# Patient Record
Sex: MALE | Race: WHITE | NOT HISPANIC OR LATINO | Employment: OTHER | URBAN - METROPOLITAN AREA
[De-identification: names, ages, dates, MRNs, and addresses within clinical notes are randomized per-mention and may not be internally consistent; named-entity substitution may affect disease eponyms.]

---

## 2017-01-03 ENCOUNTER — OFFICE VISIT - HEALTHEAST (OUTPATIENT)
Dept: VASCULAR SURGERY | Facility: CLINIC | Age: 72
End: 2017-01-03

## 2017-01-03 DIAGNOSIS — L30.9 DERMATITIS: ICD-10-CM

## 2017-01-03 DIAGNOSIS — I87.303 VENOUS HYPERTENSION, CHRONIC, BILATERAL: ICD-10-CM

## 2017-01-03 DIAGNOSIS — L97.921 LEG ULCER, LEFT, LIMITED TO BREAKDOWN OF SKIN (H): ICD-10-CM

## 2017-01-03 DIAGNOSIS — G60.9 IDIOPATHIC PERIPHERAL NEUROPATHY: ICD-10-CM

## 2017-01-04 ENCOUNTER — COMMUNICATION - HEALTHEAST (OUTPATIENT)
Dept: ADMINISTRATIVE | Facility: CLINIC | Age: 72
End: 2017-01-04

## 2017-01-10 ENCOUNTER — OFFICE VISIT - HEALTHEAST (OUTPATIENT)
Dept: VASCULAR SURGERY | Facility: CLINIC | Age: 72
End: 2017-01-10

## 2017-01-10 DIAGNOSIS — L97.922 LEG ULCER, LEFT, WITH FAT LAYER EXPOSED (H): ICD-10-CM

## 2017-01-10 DIAGNOSIS — G60.9 IDIOPATHIC PERIPHERAL NEUROPATHY: ICD-10-CM

## 2017-01-10 DIAGNOSIS — L30.9 DERMATITIS: ICD-10-CM

## 2017-01-10 DIAGNOSIS — I87.303 VENOUS HYPERTENSION, CHRONIC, BILATERAL: ICD-10-CM

## 2017-01-17 ENCOUNTER — OFFICE VISIT - HEALTHEAST (OUTPATIENT)
Dept: VASCULAR SURGERY | Facility: CLINIC | Age: 72
End: 2017-01-17

## 2017-01-17 DIAGNOSIS — I83.023 VENOUS ULCER OF ANKLE, LEFT (H): ICD-10-CM

## 2017-01-17 DIAGNOSIS — G63 POLYNEUROPATHY ASSOCIATED WITH UNDERLYING DISEASE (H): ICD-10-CM

## 2017-01-17 DIAGNOSIS — L30.9 DERMATITIS: ICD-10-CM

## 2017-01-17 DIAGNOSIS — R60.0 LOCALIZED EDEMA: ICD-10-CM

## 2017-01-17 DIAGNOSIS — L97.329 VENOUS ULCER OF ANKLE, LEFT (H): ICD-10-CM

## 2017-01-17 DIAGNOSIS — L97.921 LEG ULCER, LEFT, LIMITED TO BREAKDOWN OF SKIN (H): ICD-10-CM

## 2017-01-17 DIAGNOSIS — I87.303 VENOUS HYPERTENSION, CHRONIC, BILATERAL: ICD-10-CM

## 2017-01-19 ENCOUNTER — AMBULATORY - HEALTHEAST (OUTPATIENT)
Dept: VASCULAR SURGERY | Facility: CLINIC | Age: 72
End: 2017-01-19

## 2017-01-19 DIAGNOSIS — I87.303 VENOUS HYPERTENSION, CHRONIC, BILATERAL: ICD-10-CM

## 2017-01-19 DIAGNOSIS — L97.921 LEG ULCER, LEFT, LIMITED TO BREAKDOWN OF SKIN (H): ICD-10-CM

## 2017-01-24 ENCOUNTER — OFFICE VISIT - HEALTHEAST (OUTPATIENT)
Dept: VASCULAR SURGERY | Facility: CLINIC | Age: 72
End: 2017-01-24

## 2017-01-24 DIAGNOSIS — L97.921 LEG ULCER, LEFT, LIMITED TO BREAKDOWN OF SKIN (H): ICD-10-CM

## 2017-01-24 DIAGNOSIS — G63 POLYNEUROPATHY ASSOCIATED WITH UNDERLYING DISEASE (H): ICD-10-CM

## 2017-01-24 DIAGNOSIS — L30.9 DERMATITIS: ICD-10-CM

## 2017-01-24 DIAGNOSIS — L97.911 SKIN ULCER OF RIGHT LOWER LEG, LIMITED TO BREAKDOWN OF SKIN (H): ICD-10-CM

## 2017-01-24 DIAGNOSIS — I83.023 VENOUS ULCER OF ANKLE, LEFT (H): ICD-10-CM

## 2017-01-24 DIAGNOSIS — L97.329 VENOUS ULCER OF ANKLE, LEFT (H): ICD-10-CM

## 2017-01-24 DIAGNOSIS — I87.303 VENOUS HYPERTENSION, CHRONIC, BILATERAL: ICD-10-CM

## 2017-02-02 ENCOUNTER — COMMUNICATION - HEALTHEAST (OUTPATIENT)
Dept: VASCULAR SURGERY | Facility: CLINIC | Age: 72
End: 2017-02-02

## 2017-02-02 ENCOUNTER — OFFICE VISIT - HEALTHEAST (OUTPATIENT)
Dept: VASCULAR SURGERY | Facility: CLINIC | Age: 72
End: 2017-02-02

## 2017-02-02 DIAGNOSIS — G60.9 IDIOPATHIC PERIPHERAL NEUROPATHY: ICD-10-CM

## 2017-02-02 DIAGNOSIS — L30.9 DERMATITIS: ICD-10-CM

## 2017-02-02 DIAGNOSIS — I87.303 VENOUS HYPERTENSION, CHRONIC, BILATERAL: ICD-10-CM

## 2017-02-02 DIAGNOSIS — G63 POLYNEUROPATHY ASSOCIATED WITH UNDERLYING DISEASE (H): ICD-10-CM

## 2017-02-08 ENCOUNTER — COMMUNICATION - HEALTHEAST (OUTPATIENT)
Dept: VASCULAR SURGERY | Facility: CLINIC | Age: 72
End: 2017-02-08

## 2017-02-14 ENCOUNTER — COMMUNICATION - HEALTHEAST (OUTPATIENT)
Dept: VASCULAR SURGERY | Facility: CLINIC | Age: 72
End: 2017-02-14

## 2017-02-22 ENCOUNTER — OFFICE VISIT - HEALTHEAST (OUTPATIENT)
Dept: VASCULAR SURGERY | Facility: CLINIC | Age: 72
End: 2017-02-22

## 2017-02-22 DIAGNOSIS — L03.90 CELLULITIS: ICD-10-CM

## 2017-02-22 DIAGNOSIS — Z91.148 NON COMPLIANCE W MEDICATION REGIMEN: ICD-10-CM

## 2017-02-22 DIAGNOSIS — L97.509 DIABETIC FOOT ULCER (H): ICD-10-CM

## 2017-02-22 DIAGNOSIS — G63 POLYNEUROPATHY ASSOCIATED WITH UNDERLYING DISEASE (H): ICD-10-CM

## 2017-02-22 DIAGNOSIS — E11.621 DIABETIC FOOT ULCER (H): ICD-10-CM

## 2017-02-22 DIAGNOSIS — I87.303 VENOUS HYPERTENSION, CHRONIC, BILATERAL: ICD-10-CM

## 2017-02-22 DIAGNOSIS — G60.9 IDIOPATHIC PERIPHERAL NEUROPATHY: ICD-10-CM

## 2017-02-22 DIAGNOSIS — L30.9 DERMATITIS: ICD-10-CM

## 2017-03-01 ENCOUNTER — OFFICE VISIT - HEALTHEAST (OUTPATIENT)
Dept: VASCULAR SURGERY | Facility: CLINIC | Age: 72
End: 2017-03-01

## 2017-03-01 DIAGNOSIS — G60.9 IDIOPATHIC PERIPHERAL NEUROPATHY: ICD-10-CM

## 2017-03-01 DIAGNOSIS — L30.9 DERMATITIS: ICD-10-CM

## 2017-03-01 DIAGNOSIS — L97.921 LEG ULCER, LEFT, LIMITED TO BREAKDOWN OF SKIN (H): ICD-10-CM

## 2017-03-01 DIAGNOSIS — E11.9 DIABETES (H): ICD-10-CM

## 2017-03-01 ASSESSMENT — MIFFLIN-ST. JEOR: SCORE: 1696.74

## 2017-03-02 ENCOUNTER — COMMUNICATION - HEALTHEAST (OUTPATIENT)
Dept: VASCULAR SURGERY | Facility: CLINIC | Age: 72
End: 2017-03-02

## 2017-03-02 ENCOUNTER — AMBULATORY - HEALTHEAST (OUTPATIENT)
Dept: VASCULAR SURGERY | Facility: CLINIC | Age: 72
End: 2017-03-02

## 2017-03-02 DIAGNOSIS — L30.9 DERMATITIS: ICD-10-CM

## 2017-03-02 DIAGNOSIS — L97.921 LEG ULCER, LEFT, LIMITED TO BREAKDOWN OF SKIN (H): ICD-10-CM

## 2017-03-08 ENCOUNTER — AMBULATORY - HEALTHEAST (OUTPATIENT)
Dept: VASCULAR SURGERY | Facility: CLINIC | Age: 72
End: 2017-03-08

## 2017-03-08 DIAGNOSIS — I87.303 VENOUS HYPERTENSION, CHRONIC, BILATERAL: ICD-10-CM

## 2017-03-08 DIAGNOSIS — L97.921 LEG ULCER, LEFT, LIMITED TO BREAKDOWN OF SKIN (H): ICD-10-CM

## 2017-03-09 ENCOUNTER — RECORDS - HEALTHEAST (OUTPATIENT)
Dept: LAB | Facility: CLINIC | Age: 72
End: 2017-03-09

## 2017-03-13 LAB
GLIADIN IGA SER-ACNC: 102 U/ML
GLIADIN IGG SER-ACNC: 1.1 U/ML
IGA SERPL-MCNC: 434 MG/DL (ref 65–400)
TTG IGA SER-ACNC: 1.9 U/ML
TTG IGG SER-ACNC: 0.7 U/ML

## 2017-03-17 ENCOUNTER — OFFICE VISIT - HEALTHEAST (OUTPATIENT)
Dept: VASCULAR SURGERY | Facility: CLINIC | Age: 72
End: 2017-03-17

## 2017-03-17 DIAGNOSIS — Z91.148 NON COMPLIANCE W MEDICATION REGIMEN: ICD-10-CM

## 2017-03-17 DIAGNOSIS — I87.303 VENOUS HYPERTENSION, CHRONIC, BILATERAL: ICD-10-CM

## 2017-03-17 DIAGNOSIS — E11.9 DIABETES (H): ICD-10-CM

## 2017-03-17 DIAGNOSIS — L30.9 DERMATITIS: ICD-10-CM

## 2017-03-17 DIAGNOSIS — G60.9 IDIOPATHIC PERIPHERAL NEUROPATHY: ICD-10-CM

## 2017-03-17 DIAGNOSIS — L97.921 LEG ULCER, LEFT, LIMITED TO BREAKDOWN OF SKIN (H): ICD-10-CM

## 2017-03-24 ENCOUNTER — OFFICE VISIT - HEALTHEAST (OUTPATIENT)
Dept: VASCULAR SURGERY | Facility: CLINIC | Age: 72
End: 2017-03-24

## 2017-03-24 DIAGNOSIS — L30.9 DERMATITIS: ICD-10-CM

## 2017-03-24 DIAGNOSIS — I87.303 VENOUS HYPERTENSION, CHRONIC, BILATERAL: ICD-10-CM

## 2017-03-24 DIAGNOSIS — E11.9 DIABETES (H): ICD-10-CM

## 2017-03-24 DIAGNOSIS — L97.921 LEG ULCER, LEFT, LIMITED TO BREAKDOWN OF SKIN (H): ICD-10-CM

## 2017-03-30 ENCOUNTER — AMBULATORY - HEALTHEAST (OUTPATIENT)
Dept: VASCULAR SURGERY | Facility: CLINIC | Age: 72
End: 2017-03-30

## 2017-03-30 DIAGNOSIS — L97.921 LEG ULCER, LEFT, LIMITED TO BREAKDOWN OF SKIN (H): ICD-10-CM

## 2017-03-30 DIAGNOSIS — I87.303 VENOUS HYPERTENSION, CHRONIC, BILATERAL: ICD-10-CM

## 2017-08-10 ENCOUNTER — OFFICE VISIT - HEALTHEAST (OUTPATIENT)
Dept: VASCULAR SURGERY | Facility: CLINIC | Age: 72
End: 2017-08-10

## 2017-08-10 ENCOUNTER — RECORDS - HEALTHEAST (OUTPATIENT)
Dept: ADMINISTRATIVE | Facility: OTHER | Age: 72
End: 2017-08-10

## 2017-08-10 DIAGNOSIS — L97.921 LEG ULCER, LEFT, LIMITED TO BREAKDOWN OF SKIN (H): ICD-10-CM

## 2017-08-10 DIAGNOSIS — E11.9 DIABETES (H): ICD-10-CM

## 2017-08-10 DIAGNOSIS — I87.303 VENOUS HYPERTENSION, CHRONIC, BILATERAL: ICD-10-CM

## 2017-08-10 DIAGNOSIS — G63 POLYNEUROPATHY ASSOCIATED WITH UNDERLYING DISEASE (H): ICD-10-CM

## 2017-08-10 ASSESSMENT — MIFFLIN-ST. JEOR: SCORE: 1696.93

## 2017-08-14 ENCOUNTER — COMMUNICATION - HEALTHEAST (OUTPATIENT)
Dept: VASCULAR SURGERY | Facility: CLINIC | Age: 72
End: 2017-08-14

## 2017-08-25 ENCOUNTER — OFFICE VISIT - HEALTHEAST (OUTPATIENT)
Dept: VASCULAR SURGERY | Facility: CLINIC | Age: 72
End: 2017-08-25

## 2017-08-25 ENCOUNTER — RECORDS - HEALTHEAST (OUTPATIENT)
Dept: LAB | Facility: CLINIC | Age: 72
End: 2017-08-25

## 2017-08-25 DIAGNOSIS — L97.921 LEG ULCER, LEFT, LIMITED TO BREAKDOWN OF SKIN (H): ICD-10-CM

## 2017-08-25 DIAGNOSIS — T14.8XXA LOCAL INFECTION OF WOUND: ICD-10-CM

## 2017-08-25 DIAGNOSIS — I87.303 VENOUS HYPERTENSION, CHRONIC, BILATERAL: ICD-10-CM

## 2017-08-25 DIAGNOSIS — L08.9 LOCAL INFECTION OF WOUND: ICD-10-CM

## 2017-08-25 LAB
CHOLEST SERPL-MCNC: 115 MG/DL
FASTING STATUS PATIENT QL REPORTED: ABNORMAL
HDLC SERPL-MCNC: 26 MG/DL
LDLC SERPL CALC-MCNC: 63 MG/DL
TRIGL SERPL-MCNC: 130 MG/DL

## 2017-08-29 ENCOUNTER — AMBULATORY - HEALTHEAST (OUTPATIENT)
Dept: VASCULAR SURGERY | Facility: CLINIC | Age: 72
End: 2017-08-29

## 2017-08-29 DIAGNOSIS — L97.921 LEG ULCER, LEFT, LIMITED TO BREAKDOWN OF SKIN (H): ICD-10-CM

## 2017-09-01 ENCOUNTER — OFFICE VISIT - HEALTHEAST (OUTPATIENT)
Dept: VASCULAR SURGERY | Facility: CLINIC | Age: 72
End: 2017-09-01

## 2017-09-01 DIAGNOSIS — L97.921 LEG ULCER, LEFT, LIMITED TO BREAKDOWN OF SKIN (H): ICD-10-CM

## 2017-09-01 DIAGNOSIS — I87.303 VENOUS HYPERTENSION, CHRONIC, BILATERAL: ICD-10-CM

## 2017-09-01 DIAGNOSIS — G63 POLYNEUROPATHY ASSOCIATED WITH UNDERLYING DISEASE (H): ICD-10-CM

## 2017-09-06 ENCOUNTER — COMMUNICATION - HEALTHEAST (OUTPATIENT)
Dept: VASCULAR SURGERY | Facility: CLINIC | Age: 72
End: 2017-09-06

## 2017-09-07 ENCOUNTER — COMMUNICATION - HEALTHEAST (OUTPATIENT)
Dept: VASCULAR SURGERY | Facility: CLINIC | Age: 72
End: 2017-09-07

## 2017-09-07 ENCOUNTER — AMBULATORY - HEALTHEAST (OUTPATIENT)
Dept: VASCULAR SURGERY | Facility: CLINIC | Age: 72
End: 2017-09-07

## 2017-09-07 DIAGNOSIS — G63 POLYNEUROPATHY ASSOCIATED WITH UNDERLYING DISEASE (H): ICD-10-CM

## 2017-09-07 DIAGNOSIS — L30.9 DERMATITIS: ICD-10-CM

## 2017-09-07 DIAGNOSIS — L97.921 LEG ULCER, LEFT, LIMITED TO BREAKDOWN OF SKIN (H): ICD-10-CM

## 2017-09-07 DIAGNOSIS — I87.303 VENOUS HYPERTENSION, CHRONIC, BILATERAL: ICD-10-CM

## 2017-09-12 ENCOUNTER — AMBULATORY - HEALTHEAST (OUTPATIENT)
Dept: VASCULAR SURGERY | Facility: CLINIC | Age: 72
End: 2017-09-12

## 2017-09-12 DIAGNOSIS — I87.303 VENOUS HYPERTENSION, CHRONIC, BILATERAL: ICD-10-CM

## 2017-09-12 DIAGNOSIS — L97.921 LEG ULCER, LEFT, LIMITED TO BREAKDOWN OF SKIN (H): ICD-10-CM

## 2017-09-14 ENCOUNTER — OFFICE VISIT - HEALTHEAST (OUTPATIENT)
Dept: VASCULAR SURGERY | Facility: CLINIC | Age: 72
End: 2017-09-14

## 2017-09-14 DIAGNOSIS — I87.303 VENOUS HYPERTENSION, CHRONIC, BILATERAL: ICD-10-CM

## 2017-09-14 DIAGNOSIS — L97.921 LEG ULCER, LEFT, LIMITED TO BREAKDOWN OF SKIN (H): ICD-10-CM

## 2017-09-14 DIAGNOSIS — I87.2 VENOUS INSUFFICIENCY OF BOTH LOWER EXTREMITIES: ICD-10-CM

## 2017-09-14 DIAGNOSIS — B36.9 FUNGAL INFECTION OF SKIN: ICD-10-CM

## 2017-09-21 ENCOUNTER — AMBULATORY - HEALTHEAST (OUTPATIENT)
Dept: VASCULAR SURGERY | Facility: CLINIC | Age: 72
End: 2017-09-21

## 2017-09-21 DIAGNOSIS — I87.303 VENOUS HYPERTENSION, CHRONIC, BILATERAL: ICD-10-CM

## 2017-09-21 DIAGNOSIS — L97.921 LEG ULCER, LEFT, LIMITED TO BREAKDOWN OF SKIN (H): ICD-10-CM

## 2017-10-04 ENCOUNTER — OFFICE VISIT - HEALTHEAST (OUTPATIENT)
Dept: VASCULAR SURGERY | Facility: CLINIC | Age: 72
End: 2017-10-04

## 2017-10-04 DIAGNOSIS — L97.511 SKIN ULCER OF TOE OF RIGHT FOOT, LIMITED TO BREAKDOWN OF SKIN (H): ICD-10-CM

## 2017-10-04 DIAGNOSIS — L97.921 LEG ULCER, LEFT, LIMITED TO BREAKDOWN OF SKIN (H): ICD-10-CM

## 2017-10-04 DIAGNOSIS — I87.303 VENOUS HYPERTENSION, CHRONIC, BILATERAL: ICD-10-CM

## 2017-10-04 DIAGNOSIS — G63 POLYNEUROPATHY ASSOCIATED WITH UNDERLYING DISEASE (H): ICD-10-CM

## 2017-10-04 DIAGNOSIS — E11.9 DIABETES MELLITUS, TYPE 2 (H): ICD-10-CM

## 2017-10-11 ENCOUNTER — AMBULATORY - HEALTHEAST (OUTPATIENT)
Dept: VASCULAR SURGERY | Facility: CLINIC | Age: 72
End: 2017-10-11

## 2017-10-11 DIAGNOSIS — L97.511 SKIN ULCER OF TOE OF RIGHT FOOT, LIMITED TO BREAKDOWN OF SKIN (H): ICD-10-CM

## 2017-10-11 DIAGNOSIS — I87.303 VENOUS HYPERTENSION, CHRONIC, BILATERAL: ICD-10-CM

## 2017-10-31 ENCOUNTER — COMMUNICATION - HEALTHEAST (OUTPATIENT)
Dept: VASCULAR SURGERY | Facility: CLINIC | Age: 72
End: 2017-10-31

## 2017-11-01 ASSESSMENT — MIFFLIN-ST. JEOR: SCORE: 1679.73

## 2017-11-02 ASSESSMENT — MIFFLIN-ST. JEOR: SCORE: 1684.27

## 2017-11-03 ASSESSMENT — MIFFLIN-ST. JEOR: SCORE: 1688.8

## 2017-11-04 ENCOUNTER — ANESTHESIA - HEALTHEAST (OUTPATIENT)
Dept: SURGERY | Facility: HOSPITAL | Age: 72
End: 2017-11-04

## 2017-11-04 ASSESSMENT — MIFFLIN-ST. JEOR: SCORE: 1691.07

## 2017-11-05 ENCOUNTER — SURGERY - HEALTHEAST (OUTPATIENT)
Dept: SURGERY | Facility: HOSPITAL | Age: 72
End: 2017-11-05

## 2017-11-06 ASSESSMENT — MIFFLIN-ST. JEOR: SCORE: 1683.81

## 2017-11-07 ASSESSMENT — MIFFLIN-ST. JEOR: SCORE: 1693.34

## 2017-11-08 ASSESSMENT — MIFFLIN-ST. JEOR: SCORE: 1663.85

## 2017-11-09 ASSESSMENT — MIFFLIN-ST. JEOR: SCORE: 1670.66

## 2017-11-10 ENCOUNTER — AMBULATORY - HEALTHEAST (OUTPATIENT)
Dept: GERIATRICS | Facility: CLINIC | Age: 72
End: 2017-11-10

## 2017-11-11 ENCOUNTER — HOME CARE/HOSPICE - HEALTHEAST (OUTPATIENT)
Dept: HOME HEALTH SERVICES | Facility: HOME HEALTH | Age: 72
End: 2017-11-11

## 2017-11-13 ENCOUNTER — HOME CARE/HOSPICE - HEALTHEAST (OUTPATIENT)
Dept: HOME HEALTH SERVICES | Facility: HOME HEALTH | Age: 72
End: 2017-11-13

## 2017-11-16 ENCOUNTER — OFFICE VISIT - HEALTHEAST (OUTPATIENT)
Dept: GERIATRICS | Facility: CLINIC | Age: 72
End: 2017-11-16

## 2017-11-16 DIAGNOSIS — E87.5 HYPERKALEMIA: ICD-10-CM

## 2017-11-16 DIAGNOSIS — Z89.432 PARTIAL NONTRAUMATIC AMPUTATION OF FOOT, LEFT (H): ICD-10-CM

## 2017-11-16 DIAGNOSIS — I48.91 ATRIAL FIBRILLATION (H): ICD-10-CM

## 2017-11-16 DIAGNOSIS — Z51.81 ANTICOAGULATION MANAGEMENT ENCOUNTER: ICD-10-CM

## 2017-11-16 DIAGNOSIS — Z79.01 ANTICOAGULATION MANAGEMENT ENCOUNTER: ICD-10-CM

## 2017-11-16 DIAGNOSIS — I99.8 PERIPHERAL ISCHEMIA: ICD-10-CM

## 2017-11-21 ENCOUNTER — OFFICE VISIT - HEALTHEAST (OUTPATIENT)
Dept: GERIATRICS | Facility: CLINIC | Age: 72
End: 2017-11-21

## 2017-11-21 DIAGNOSIS — L97.921 LEG ULCER, LEFT, LIMITED TO BREAKDOWN OF SKIN (H): ICD-10-CM

## 2017-11-21 DIAGNOSIS — E11.9 TYPE 2 DIABETES MELLITUS (H): ICD-10-CM

## 2017-11-27 ENCOUNTER — AMBULATORY - HEALTHEAST (OUTPATIENT)
Dept: GERIATRICS | Facility: CLINIC | Age: 72
End: 2017-11-27

## 2017-11-28 ENCOUNTER — OFFICE VISIT - HEALTHEAST (OUTPATIENT)
Dept: GERIATRICS | Facility: CLINIC | Age: 72
End: 2017-11-28

## 2017-11-28 DIAGNOSIS — Z89.432 S/P TRANSMETATARSAL AMPUTATION OF FOOT, LEFT (H): ICD-10-CM

## 2017-11-28 DIAGNOSIS — E78.5 DYSLIPIDEMIA, GOAL LDL BELOW 70: ICD-10-CM

## 2017-11-28 DIAGNOSIS — L97.519 DIABETIC ULCER OF BOTH FEET (H): ICD-10-CM

## 2017-11-28 DIAGNOSIS — Z78.9 UNABLE TO FUNCTION INDEPENDENTLY: ICD-10-CM

## 2017-11-28 DIAGNOSIS — I73.9 PAD (PERIPHERAL ARTERY DISEASE) (H): ICD-10-CM

## 2017-11-28 DIAGNOSIS — L97.509 TYPE 2 DIABETES MELLITUS WITH FOOT ULCER, WITHOUT LONG-TERM CURRENT USE OF INSULIN (H): ICD-10-CM

## 2017-11-28 DIAGNOSIS — G63 POLYNEUROPATHY ASSOCIATED WITH UNDERLYING DISEASE (H): ICD-10-CM

## 2017-11-28 DIAGNOSIS — I48.19 PERSISTENT ATRIAL FIBRILLATION (H): ICD-10-CM

## 2017-11-28 DIAGNOSIS — L97.529 DIABETIC ULCER OF BOTH FEET (H): ICD-10-CM

## 2017-11-28 DIAGNOSIS — E11.621 TYPE 2 DIABETES MELLITUS WITH FOOT ULCER, WITHOUT LONG-TERM CURRENT USE OF INSULIN (H): ICD-10-CM

## 2017-11-28 DIAGNOSIS — E11.621 DIABETIC ULCER OF BOTH FEET (H): ICD-10-CM

## 2017-11-28 DIAGNOSIS — I25.10 ATHEROSCLEROSIS OF NATIVE CORONARY ARTERY OF NATIVE HEART WITHOUT ANGINA PECTORIS: ICD-10-CM

## 2017-11-28 DIAGNOSIS — I96 GANGRENE OF LEFT FOOT (H): ICD-10-CM

## 2017-12-01 ENCOUNTER — OFFICE VISIT - HEALTHEAST (OUTPATIENT)
Dept: VASCULAR SURGERY | Facility: CLINIC | Age: 72
End: 2017-12-01

## 2017-12-01 DIAGNOSIS — L97.509 ULCER OF FOOT (H): ICD-10-CM

## 2017-12-06 ENCOUNTER — RECORDS - HEALTHEAST (OUTPATIENT)
Dept: LAB | Facility: CLINIC | Age: 72
End: 2017-12-06

## 2017-12-06 LAB
CREAT SERPL-MCNC: 0.84 MG/DL (ref 0.7–1.3)
GFR SERPL CREATININE-BSD FRML MDRD: >60 ML/MIN/1.73M2

## 2017-12-08 ENCOUNTER — COMMUNICATION - HEALTHEAST (OUTPATIENT)
Dept: VASCULAR SURGERY | Facility: CLINIC | Age: 72
End: 2017-12-08

## 2017-12-14 ENCOUNTER — AMBULATORY - HEALTHEAST (OUTPATIENT)
Dept: GERIATRICS | Facility: CLINIC | Age: 72
End: 2017-12-14

## 2017-12-15 ENCOUNTER — OFFICE VISIT - HEALTHEAST (OUTPATIENT)
Dept: VASCULAR SURGERY | Facility: CLINIC | Age: 72
End: 2017-12-15

## 2017-12-15 DIAGNOSIS — L97.511 SKIN ULCER OF SECOND TOE OF RIGHT FOOT, LIMITED TO BREAKDOWN OF SKIN (H): ICD-10-CM

## 2017-12-15 DIAGNOSIS — I87.2 VENOUS STASIS DERMATITIS OF RIGHT LOWER EXTREMITY: ICD-10-CM

## 2017-12-15 DIAGNOSIS — L97.509 TYPE 2 DIABETES MELLITUS WITH FOOT ULCER, WITHOUT LONG-TERM CURRENT USE OF INSULIN (H): ICD-10-CM

## 2017-12-15 DIAGNOSIS — E11.621 TYPE 2 DIABETES MELLITUS WITH FOOT ULCER, WITHOUT LONG-TERM CURRENT USE OF INSULIN (H): ICD-10-CM

## 2017-12-15 DIAGNOSIS — I89.0 ACQUIRED LYMPHEDEMA OF LOWER EXTREMITY: ICD-10-CM

## 2017-12-15 DIAGNOSIS — L97.921 LEG ULCER, LEFT, LIMITED TO BREAKDOWN OF SKIN (H): ICD-10-CM

## 2017-12-15 DIAGNOSIS — G63 POLYNEUROPATHY ASSOCIATED WITH UNDERLYING DISEASE (H): ICD-10-CM

## 2017-12-15 ASSESSMENT — MIFFLIN-ST. JEOR: SCORE: 1613.96

## 2017-12-18 ENCOUNTER — OFFICE VISIT - HEALTHEAST (OUTPATIENT)
Dept: GERIATRICS | Facility: CLINIC | Age: 72
End: 2017-12-18

## 2017-12-18 ENCOUNTER — COMMUNICATION - HEALTHEAST (OUTPATIENT)
Dept: VASCULAR SURGERY | Facility: CLINIC | Age: 72
End: 2017-12-18

## 2017-12-18 DIAGNOSIS — E11.621 TYPE 2 DIABETES MELLITUS WITH FOOT ULCER, WITHOUT LONG-TERM CURRENT USE OF INSULIN (H): ICD-10-CM

## 2017-12-18 DIAGNOSIS — Z89.432 S/P TRANSMETATARSAL AMPUTATION OF FOOT, LEFT (H): ICD-10-CM

## 2017-12-18 DIAGNOSIS — I48.19 PERSISTENT ATRIAL FIBRILLATION (H): ICD-10-CM

## 2017-12-18 DIAGNOSIS — L97.509 TYPE 2 DIABETES MELLITUS WITH FOOT ULCER, WITHOUT LONG-TERM CURRENT USE OF INSULIN (H): ICD-10-CM

## 2017-12-18 DIAGNOSIS — L97.921 LEG ULCER, LEFT, LIMITED TO BREAKDOWN OF SKIN (H): ICD-10-CM

## 2017-12-20 ENCOUNTER — COMMUNICATION - HEALTHEAST (OUTPATIENT)
Dept: VASCULAR SURGERY | Facility: CLINIC | Age: 72
End: 2017-12-20

## 2017-12-21 ENCOUNTER — COMMUNICATION - HEALTHEAST (OUTPATIENT)
Dept: VASCULAR SURGERY | Facility: CLINIC | Age: 72
End: 2017-12-21

## 2017-12-22 ENCOUNTER — RECORDS - HEALTHEAST (OUTPATIENT)
Dept: ADMINISTRATIVE | Facility: OTHER | Age: 72
End: 2017-12-22

## 2017-12-22 ENCOUNTER — OFFICE VISIT - HEALTHEAST (OUTPATIENT)
Dept: VASCULAR SURGERY | Facility: CLINIC | Age: 72
End: 2017-12-22

## 2017-12-22 DIAGNOSIS — E11.621 TYPE 2 DIABETES MELLITUS WITH FOOT ULCER, WITHOUT LONG-TERM CURRENT USE OF INSULIN (H): ICD-10-CM

## 2017-12-22 DIAGNOSIS — I87.303 VENOUS HYPERTENSION, CHRONIC, BILATERAL: ICD-10-CM

## 2017-12-22 DIAGNOSIS — Z89.432 S/P TRANSMETATARSAL AMPUTATION OF FOOT, LEFT (H): ICD-10-CM

## 2017-12-22 DIAGNOSIS — L97.509 TYPE 2 DIABETES MELLITUS WITH FOOT ULCER, WITHOUT LONG-TERM CURRENT USE OF INSULIN (H): ICD-10-CM

## 2017-12-22 DIAGNOSIS — Z78.9 UNABLE TO FUNCTION INDEPENDENTLY: ICD-10-CM

## 2017-12-22 DIAGNOSIS — G63 POLYNEUROPATHY ASSOCIATED WITH UNDERLYING DISEASE (H): ICD-10-CM

## 2017-12-22 DIAGNOSIS — I73.9 PAD (PERIPHERAL ARTERY DISEASE) (H): ICD-10-CM

## 2017-12-22 DIAGNOSIS — I89.0 ACQUIRED LYMPHEDEMA OF LOWER EXTREMITY: ICD-10-CM

## 2017-12-22 DIAGNOSIS — L97.921 LEG ULCER, LEFT, LIMITED TO BREAKDOWN OF SKIN (H): ICD-10-CM

## 2018-01-03 ENCOUNTER — OFFICE VISIT - HEALTHEAST (OUTPATIENT)
Dept: GERIATRICS | Facility: CLINIC | Age: 73
End: 2018-01-03

## 2018-01-03 DIAGNOSIS — E11.621 TYPE 2 DIABETES MELLITUS WITH FOOT ULCER, WITHOUT LONG-TERM CURRENT USE OF INSULIN (H): ICD-10-CM

## 2018-01-03 DIAGNOSIS — I48.92 ATRIAL FLUTTER (H): ICD-10-CM

## 2018-01-03 DIAGNOSIS — L97.921 LEG ULCER, LEFT, LIMITED TO BREAKDOWN OF SKIN (H): ICD-10-CM

## 2018-01-03 DIAGNOSIS — L97.509 TYPE 2 DIABETES MELLITUS WITH FOOT ULCER, WITHOUT LONG-TERM CURRENT USE OF INSULIN (H): ICD-10-CM

## 2018-01-03 DIAGNOSIS — I87.303 VENOUS HYPERTENSION, CHRONIC, BILATERAL: ICD-10-CM

## 2018-01-03 DIAGNOSIS — I89.0 ACQUIRED LYMPHEDEMA OF LOWER EXTREMITY: ICD-10-CM

## 2018-01-03 DIAGNOSIS — G63 POLYNEUROPATHY ASSOCIATED WITH UNDERLYING DISEASE (H): ICD-10-CM

## 2018-01-03 DIAGNOSIS — Z89.432 S/P TRANSMETATARSAL AMPUTATION OF FOOT, LEFT (H): ICD-10-CM

## 2018-01-03 DIAGNOSIS — B37.49 CANDIDIASIS OF PERINEUM: ICD-10-CM

## 2018-01-03 DIAGNOSIS — I73.9 PAD (PERIPHERAL ARTERY DISEASE) (H): ICD-10-CM

## 2018-01-03 DIAGNOSIS — I25.810 CORONARY ARTERY DISEASE INVOLVING CORONARY BYPASS GRAFT OF NATIVE HEART WITHOUT ANGINA PECTORIS: ICD-10-CM

## 2018-01-05 ENCOUNTER — OFFICE VISIT - HEALTHEAST (OUTPATIENT)
Dept: VASCULAR SURGERY | Facility: CLINIC | Age: 73
End: 2018-01-05

## 2018-01-05 DIAGNOSIS — L97.501 ULCER OF FOOT, LIMITED TO BREAKDOWN OF SKIN, UNSPECIFIED LATERALITY (H): ICD-10-CM

## 2018-01-08 ENCOUNTER — COMMUNICATION - HEALTHEAST (OUTPATIENT)
Dept: VASCULAR SURGERY | Facility: CLINIC | Age: 73
End: 2018-01-08

## 2018-01-08 ENCOUNTER — OFFICE VISIT - HEALTHEAST (OUTPATIENT)
Dept: VASCULAR SURGERY | Facility: CLINIC | Age: 73
End: 2018-01-08

## 2018-01-08 DIAGNOSIS — L08.9 LOCAL INFECTION OF WOUND: ICD-10-CM

## 2018-01-08 DIAGNOSIS — L97.501 ULCER OF FOOT, LIMITED TO BREAKDOWN OF SKIN, UNSPECIFIED LATERALITY (H): ICD-10-CM

## 2018-01-08 DIAGNOSIS — L97.509 TYPE 2 DIABETES MELLITUS WITH FOOT ULCER, WITHOUT LONG-TERM CURRENT USE OF INSULIN (H): ICD-10-CM

## 2018-01-08 DIAGNOSIS — E11.621 TYPE 2 DIABETES MELLITUS WITH FOOT ULCER, WITHOUT LONG-TERM CURRENT USE OF INSULIN (H): ICD-10-CM

## 2018-01-08 DIAGNOSIS — L97.511 SKIN ULCER OF TOE OF RIGHT FOOT, LIMITED TO BREAKDOWN OF SKIN (H): ICD-10-CM

## 2018-01-08 DIAGNOSIS — I73.9 PAD (PERIPHERAL ARTERY DISEASE) (H): ICD-10-CM

## 2018-01-08 DIAGNOSIS — G63 POLYNEUROPATHY ASSOCIATED WITH UNDERLYING DISEASE (H): ICD-10-CM

## 2018-01-08 DIAGNOSIS — I25.5 ISCHEMIC CARDIOMYOPATHY: ICD-10-CM

## 2018-01-08 DIAGNOSIS — T14.8XXA LOCAL INFECTION OF WOUND: ICD-10-CM

## 2018-01-09 ENCOUNTER — OFFICE VISIT - HEALTHEAST (OUTPATIENT)
Dept: GERIATRICS | Facility: CLINIC | Age: 73
End: 2018-01-09

## 2018-01-09 DIAGNOSIS — I96 GANGRENE OF LEFT FOOT (H): ICD-10-CM

## 2018-01-09 DIAGNOSIS — B37.49 CANDIDIASIS OF PERINEUM: ICD-10-CM

## 2018-01-11 ENCOUNTER — COMMUNICATION - HEALTHEAST (OUTPATIENT)
Dept: VASCULAR SURGERY | Facility: CLINIC | Age: 73
End: 2018-01-11

## 2018-01-11 LAB
BACTERIA SPEC CULT: ABNORMAL
GRAM STAIN RESULT: ABNORMAL

## 2018-01-16 ENCOUNTER — OFFICE VISIT - HEALTHEAST (OUTPATIENT)
Dept: GERIATRICS | Facility: CLINIC | Age: 73
End: 2018-01-16

## 2018-01-16 ENCOUNTER — AMBULATORY - HEALTHEAST (OUTPATIENT)
Dept: GERIATRICS | Facility: CLINIC | Age: 73
End: 2018-01-16

## 2018-01-16 DIAGNOSIS — B37.49 CANDIDIASIS OF PERINEUM: ICD-10-CM

## 2018-01-18 ENCOUNTER — COMMUNICATION - HEALTHEAST (OUTPATIENT)
Dept: VASCULAR SURGERY | Facility: CLINIC | Age: 73
End: 2018-01-18

## 2018-01-25 ENCOUNTER — OFFICE VISIT - HEALTHEAST (OUTPATIENT)
Dept: VASCULAR SURGERY | Facility: CLINIC | Age: 73
End: 2018-01-25

## 2018-01-25 DIAGNOSIS — L97.501 ULCER OF FOOT, LIMITED TO BREAKDOWN OF SKIN, UNSPECIFIED LATERALITY (H): ICD-10-CM

## 2018-01-25 DIAGNOSIS — L97.521 SKIN ULCER OF TOE OF LEFT FOOT, LIMITED TO BREAKDOWN OF SKIN (H): ICD-10-CM

## 2018-01-25 DIAGNOSIS — L97.509 TYPE 2 DIABETES MELLITUS WITH FOOT ULCER, WITHOUT LONG-TERM CURRENT USE OF INSULIN (H): ICD-10-CM

## 2018-01-25 DIAGNOSIS — E11.621 TYPE 2 DIABETES MELLITUS WITH FOOT ULCER, WITHOUT LONG-TERM CURRENT USE OF INSULIN (H): ICD-10-CM

## 2018-01-25 DIAGNOSIS — I87.303 VENOUS HYPERTENSION, CHRONIC, BILATERAL: ICD-10-CM

## 2018-01-28 LAB — INR PPP: 1.72 (ref 0.9–1.1)

## 2018-01-29 ENCOUNTER — RECORDS - HEALTHEAST (OUTPATIENT)
Dept: LAB | Facility: CLINIC | Age: 73
End: 2018-01-29

## 2018-01-29 LAB
ANION GAP SERPL CALCULATED.3IONS-SCNC: 11 MMOL/L (ref 5–18)
BUN SERPL-MCNC: 49 MG/DL (ref 8–28)
CALCIUM SERPL-MCNC: 9.4 MG/DL (ref 8.5–10.5)
CHLORIDE BLD-SCNC: 99 MMOL/L (ref 98–107)
CO2 SERPL-SCNC: 27 MMOL/L (ref 22–31)
CREAT SERPL-MCNC: 1.58 MG/DL (ref 0.7–1.3)
GFR SERPL CREATININE-BSD FRML MDRD: 43 ML/MIN/1.73M2
GLUCOSE BLD-MCNC: 150 MG/DL (ref 70–125)
POTASSIUM BLD-SCNC: 4 MMOL/L (ref 3.5–5)
SODIUM SERPL-SCNC: 137 MMOL/L (ref 136–145)

## 2018-01-30 ENCOUNTER — RECORDS - HEALTHEAST (OUTPATIENT)
Dept: LAB | Facility: CLINIC | Age: 73
End: 2018-01-30

## 2018-01-30 ENCOUNTER — OFFICE VISIT - HEALTHEAST (OUTPATIENT)
Dept: GERIATRICS | Facility: CLINIC | Age: 73
End: 2018-01-30

## 2018-01-30 DIAGNOSIS — I89.0 ACQUIRED LYMPHEDEMA OF LOWER EXTREMITY: ICD-10-CM

## 2018-01-30 DIAGNOSIS — I73.9 PAD (PERIPHERAL ARTERY DISEASE) (H): ICD-10-CM

## 2018-01-30 DIAGNOSIS — L97.519 DIABETIC ULCER OF BOTH FEET (H): ICD-10-CM

## 2018-01-30 DIAGNOSIS — E11.621 DIABETIC ULCER OF BOTH FEET (H): ICD-10-CM

## 2018-01-30 DIAGNOSIS — E11.621 TYPE 2 DIABETES MELLITUS WITH FOOT ULCER, WITHOUT LONG-TERM CURRENT USE OF INSULIN (H): ICD-10-CM

## 2018-01-30 DIAGNOSIS — L97.529 DIABETIC ULCER OF BOTH FEET (H): ICD-10-CM

## 2018-01-30 DIAGNOSIS — I48.20 CHRONIC ATRIAL FIBRILLATION (H): ICD-10-CM

## 2018-01-30 DIAGNOSIS — L97.509 TYPE 2 DIABETES MELLITUS WITH FOOT ULCER, WITHOUT LONG-TERM CURRENT USE OF INSULIN (H): ICD-10-CM

## 2018-01-30 DIAGNOSIS — I50.30 HEART FAILURE WITH PRESERVED EJECTION FRACTION (H): ICD-10-CM

## 2018-01-30 DIAGNOSIS — I25.810 CORONARY ARTERY DISEASE INVOLVING CORONARY BYPASS GRAFT OF NATIVE HEART WITHOUT ANGINA PECTORIS: ICD-10-CM

## 2018-01-30 LAB
ANION GAP SERPL CALCULATED.3IONS-SCNC: 8 MMOL/L (ref 5–18)
BUN SERPL-MCNC: 52 MG/DL (ref 8–28)
CALCIUM SERPL-MCNC: 9.1 MG/DL (ref 8.5–10.5)
CHLORIDE BLD-SCNC: 103 MMOL/L (ref 98–107)
CO2 SERPL-SCNC: 29 MMOL/L (ref 22–31)
CREAT SERPL-MCNC: 1.38 MG/DL (ref 0.7–1.3)
GFR SERPL CREATININE-BSD FRML MDRD: 51 ML/MIN/1.73M2
GLUCOSE BLD-MCNC: 147 MG/DL (ref 70–125)
POTASSIUM BLD-SCNC: 4 MMOL/L (ref 3.5–5)
SODIUM SERPL-SCNC: 140 MMOL/L (ref 136–145)

## 2018-01-31 ENCOUNTER — OFFICE VISIT - HEALTHEAST (OUTPATIENT)
Dept: GERIATRICS | Facility: CLINIC | Age: 73
End: 2018-01-31

## 2018-01-31 DIAGNOSIS — Z89.432 S/P TRANSMETATARSAL AMPUTATION OF FOOT, LEFT (H): ICD-10-CM

## 2018-01-31 DIAGNOSIS — I50.30 HEART FAILURE WITH PRESERVED EJECTION FRACTION (H): ICD-10-CM

## 2018-01-31 DIAGNOSIS — G63 POLYNEUROPATHY ASSOCIATED WITH UNDERLYING DISEASE (H): ICD-10-CM

## 2018-01-31 DIAGNOSIS — B37.49 CANDIDIASIS OF PERINEUM: ICD-10-CM

## 2018-01-31 DIAGNOSIS — N17.9 AKI (ACUTE KIDNEY INJURY) (H): ICD-10-CM

## 2018-01-31 DIAGNOSIS — I89.0 ACQUIRED LYMPHEDEMA OF LOWER EXTREMITY: ICD-10-CM

## 2018-02-01 ENCOUNTER — AMBULATORY - HEALTHEAST (OUTPATIENT)
Dept: GERIATRICS | Facility: CLINIC | Age: 73
End: 2018-02-01

## 2018-02-05 ENCOUNTER — OFFICE VISIT - HEALTHEAST (OUTPATIENT)
Dept: GERIATRICS | Facility: CLINIC | Age: 73
End: 2018-02-05

## 2018-02-05 DIAGNOSIS — L97.509 TYPE 2 DIABETES MELLITUS WITH FOOT ULCER, WITHOUT LONG-TERM CURRENT USE OF INSULIN (H): ICD-10-CM

## 2018-02-05 DIAGNOSIS — I50.30 HEART FAILURE WITH PRESERVED EJECTION FRACTION (H): ICD-10-CM

## 2018-02-05 DIAGNOSIS — I73.9 PAD (PERIPHERAL ARTERY DISEASE) (H): ICD-10-CM

## 2018-02-05 DIAGNOSIS — E11.621 TYPE 2 DIABETES MELLITUS WITH FOOT ULCER, WITHOUT LONG-TERM CURRENT USE OF INSULIN (H): ICD-10-CM

## 2018-02-05 DIAGNOSIS — I25.810 CORONARY ARTERY DISEASE INVOLVING CORONARY BYPASS GRAFT OF NATIVE HEART WITHOUT ANGINA PECTORIS: ICD-10-CM

## 2018-02-05 DIAGNOSIS — I25.5 ISCHEMIC CARDIOMYOPATHY: ICD-10-CM

## 2018-02-05 DIAGNOSIS — B37.49 CANDIDIASIS OF PERINEUM: ICD-10-CM

## 2018-02-05 DIAGNOSIS — I89.0 ACQUIRED LYMPHEDEMA OF LOWER EXTREMITY: ICD-10-CM

## 2018-02-06 ENCOUNTER — COMMUNICATION - HEALTHEAST (OUTPATIENT)
Dept: VASCULAR SURGERY | Facility: CLINIC | Age: 73
End: 2018-02-06

## 2018-02-08 ENCOUNTER — OFFICE VISIT - HEALTHEAST (OUTPATIENT)
Dept: VASCULAR SURGERY | Facility: CLINIC | Age: 73
End: 2018-02-08

## 2018-02-08 DIAGNOSIS — E11.621 TYPE 2 DIABETES MELLITUS WITH FOOT ULCER, WITHOUT LONG-TERM CURRENT USE OF INSULIN (H): ICD-10-CM

## 2018-02-08 DIAGNOSIS — G63 POLYNEUROPATHY ASSOCIATED WITH UNDERLYING DISEASE (H): ICD-10-CM

## 2018-02-08 DIAGNOSIS — L97.521 SKIN ULCER OF TOE OF LEFT FOOT, LIMITED TO BREAKDOWN OF SKIN (H): ICD-10-CM

## 2018-02-08 DIAGNOSIS — L97.509 TYPE 2 DIABETES MELLITUS WITH FOOT ULCER, WITHOUT LONG-TERM CURRENT USE OF INSULIN (H): ICD-10-CM

## 2018-02-08 DIAGNOSIS — I89.0 ACQUIRED LYMPHEDEMA OF LOWER EXTREMITY: ICD-10-CM

## 2018-02-08 DIAGNOSIS — L97.501 ULCER OF FOOT, LIMITED TO BREAKDOWN OF SKIN, UNSPECIFIED LATERALITY (H): ICD-10-CM

## 2018-02-19 ENCOUNTER — COMMUNICATION - HEALTHEAST (OUTPATIENT)
Dept: VASCULAR SURGERY | Facility: CLINIC | Age: 73
End: 2018-02-19

## 2018-02-19 ENCOUNTER — OFFICE VISIT - HEALTHEAST (OUTPATIENT)
Dept: VASCULAR SURGERY | Facility: CLINIC | Age: 73
End: 2018-02-19

## 2018-02-19 DIAGNOSIS — G63 POLYNEUROPATHY ASSOCIATED WITH UNDERLYING DISEASE (H): ICD-10-CM

## 2018-02-19 DIAGNOSIS — L97.509 TYPE 2 DIABETES MELLITUS WITH FOOT ULCER, WITHOUT LONG-TERM CURRENT USE OF INSULIN (H): ICD-10-CM

## 2018-02-19 DIAGNOSIS — T14.8XXA LOCAL INFECTION OF WOUND: ICD-10-CM

## 2018-02-19 DIAGNOSIS — E11.621 TYPE 2 DIABETES MELLITUS WITH FOOT ULCER, WITHOUT LONG-TERM CURRENT USE OF INSULIN (H): ICD-10-CM

## 2018-02-19 DIAGNOSIS — L97.511 SKIN ULCER OF TOE OF RIGHT FOOT, LIMITED TO BREAKDOWN OF SKIN (H): ICD-10-CM

## 2018-02-19 DIAGNOSIS — L97.501 ULCER OF FOOT, LIMITED TO BREAKDOWN OF SKIN, UNSPECIFIED LATERALITY (H): ICD-10-CM

## 2018-02-19 DIAGNOSIS — L08.9 LOCAL INFECTION OF WOUND: ICD-10-CM

## 2018-02-19 DIAGNOSIS — I25.5 ISCHEMIC CARDIOMYOPATHY: ICD-10-CM

## 2018-02-19 DIAGNOSIS — I73.9 PAD (PERIPHERAL ARTERY DISEASE) (H): ICD-10-CM

## 2018-02-20 ENCOUNTER — OFFICE VISIT - HEALTHEAST (OUTPATIENT)
Dept: GERIATRICS | Facility: CLINIC | Age: 73
End: 2018-02-20

## 2018-02-20 DIAGNOSIS — R04.0 FREQUENT NOSEBLEEDS: ICD-10-CM

## 2018-02-20 DIAGNOSIS — Q80.9 XERODERMIA: ICD-10-CM

## 2018-02-20 DIAGNOSIS — K64.4 EXTERNAL HEMORRHOID: ICD-10-CM

## 2018-02-22 LAB
BACTERIA SPEC CULT: ABNORMAL
GRAM STAIN RESULT: ABNORMAL
GRAM STAIN RESULT: ABNORMAL

## 2018-02-23 ENCOUNTER — COMMUNICATION - HEALTHEAST (OUTPATIENT)
Dept: VASCULAR SURGERY | Facility: CLINIC | Age: 73
End: 2018-02-23

## 2018-02-23 ENCOUNTER — OFFICE VISIT - HEALTHEAST (OUTPATIENT)
Dept: CARDIOLOGY | Facility: CLINIC | Age: 73
End: 2018-02-23

## 2018-02-23 ENCOUNTER — AMBULATORY - HEALTHEAST (OUTPATIENT)
Dept: CARDIOLOGY | Facility: CLINIC | Age: 73
End: 2018-02-23

## 2018-02-23 ENCOUNTER — OFFICE VISIT - HEALTHEAST (OUTPATIENT)
Dept: VASCULAR SURGERY | Facility: CLINIC | Age: 73
End: 2018-02-23

## 2018-02-23 DIAGNOSIS — I50.30 HEART FAILURE WITH PRESERVED EJECTION FRACTION (H): ICD-10-CM

## 2018-02-23 DIAGNOSIS — I73.9 PAD (PERIPHERAL ARTERY DISEASE) (H): ICD-10-CM

## 2018-02-23 LAB
ANION GAP SERPL CALCULATED.3IONS-SCNC: 13 MMOL/L (ref 5–18)
BUN SERPL-MCNC: 44 MG/DL (ref 8–28)
CALCIUM SERPL-MCNC: 9.5 MG/DL (ref 8.5–10.5)
CHLORIDE BLD-SCNC: 104 MMOL/L (ref 98–107)
CO2 SERPL-SCNC: 22 MMOL/L (ref 22–31)
CREAT SERPL-MCNC: 1.63 MG/DL (ref 0.7–1.3)
GFR SERPL CREATININE-BSD FRML MDRD: 42 ML/MIN/1.73M2
GLUCOSE BLD-MCNC: 117 MG/DL (ref 70–125)
POTASSIUM BLD-SCNC: 4.5 MMOL/L (ref 3.5–5)
SODIUM SERPL-SCNC: 139 MMOL/L (ref 136–145)

## 2018-02-23 ASSESSMENT — MIFFLIN-ST. JEOR: SCORE: 1647.98

## 2018-02-26 ENCOUNTER — COMMUNICATION - HEALTHEAST (OUTPATIENT)
Dept: CARDIOLOGY | Facility: CLINIC | Age: 73
End: 2018-02-26

## 2018-02-26 DIAGNOSIS — I50.30 HEART FAILURE WITH PRESERVED EJECTION FRACTION (H): ICD-10-CM

## 2018-02-27 ENCOUNTER — RECORDS - HEALTHEAST (OUTPATIENT)
Dept: ADMINISTRATIVE | Facility: OTHER | Age: 73
End: 2018-02-27

## 2018-02-28 ENCOUNTER — OFFICE VISIT - HEALTHEAST (OUTPATIENT)
Dept: GERIATRICS | Facility: CLINIC | Age: 73
End: 2018-02-28

## 2018-02-28 DIAGNOSIS — E11.621 TYPE 2 DIABETES MELLITUS WITH FOOT ULCER, WITHOUT LONG-TERM CURRENT USE OF INSULIN (H): ICD-10-CM

## 2018-02-28 DIAGNOSIS — Z89.432 S/P TRANSMETATARSAL AMPUTATION OF FOOT, LEFT (H): ICD-10-CM

## 2018-02-28 DIAGNOSIS — I50.30 HEART FAILURE WITH PRESERVED EJECTION FRACTION (H): ICD-10-CM

## 2018-02-28 DIAGNOSIS — I48.19 PERSISTENT ATRIAL FIBRILLATION (H): ICD-10-CM

## 2018-02-28 DIAGNOSIS — I25.5 ISCHEMIC CARDIOMYOPATHY: ICD-10-CM

## 2018-02-28 DIAGNOSIS — L97.521 SKIN ULCER OF TOE OF LEFT FOOT, LIMITED TO BREAKDOWN OF SKIN (H): ICD-10-CM

## 2018-02-28 DIAGNOSIS — L08.9 WOUND INFECTION: ICD-10-CM

## 2018-02-28 DIAGNOSIS — B37.49 CANDIDIASIS OF PERINEUM: ICD-10-CM

## 2018-02-28 DIAGNOSIS — N17.9 AKI (ACUTE KIDNEY INJURY) (H): ICD-10-CM

## 2018-02-28 DIAGNOSIS — I89.0 ACQUIRED LYMPHEDEMA OF LOWER EXTREMITY: ICD-10-CM

## 2018-02-28 DIAGNOSIS — I87.303 VENOUS HYPERTENSION, CHRONIC, BILATERAL: ICD-10-CM

## 2018-02-28 DIAGNOSIS — I25.810 CORONARY ARTERY DISEASE INVOLVING CORONARY BYPASS GRAFT OF NATIVE HEART WITHOUT ANGINA PECTORIS: ICD-10-CM

## 2018-02-28 DIAGNOSIS — T14.8XXA WOUND INFECTION: ICD-10-CM

## 2018-02-28 DIAGNOSIS — L97.501 ULCER OF FOOT, LIMITED TO BREAKDOWN OF SKIN, UNSPECIFIED LATERALITY (H): ICD-10-CM

## 2018-02-28 DIAGNOSIS — L97.509 TYPE 2 DIABETES MELLITUS WITH FOOT ULCER, WITHOUT LONG-TERM CURRENT USE OF INSULIN (H): ICD-10-CM

## 2018-03-09 ENCOUNTER — RECORDS - HEALTHEAST (OUTPATIENT)
Dept: LAB | Facility: CLINIC | Age: 73
End: 2018-03-09

## 2018-03-09 ENCOUNTER — OFFICE VISIT - HEALTHEAST (OUTPATIENT)
Dept: VASCULAR SURGERY | Facility: CLINIC | Age: 73
End: 2018-03-09

## 2018-03-09 DIAGNOSIS — I25.5 ISCHEMIC CARDIOMYOPATHY: ICD-10-CM

## 2018-03-09 DIAGNOSIS — L97.501 ULCER OF FOOT, LIMITED TO BREAKDOWN OF SKIN, UNSPECIFIED LATERALITY (H): ICD-10-CM

## 2018-03-09 DIAGNOSIS — G63 POLYNEUROPATHY ASSOCIATED WITH UNDERLYING DISEASE (H): ICD-10-CM

## 2018-03-12 LAB
ANION GAP SERPL CALCULATED.3IONS-SCNC: 7 MMOL/L (ref 5–18)
BUN SERPL-MCNC: 30 MG/DL (ref 8–28)
CALCIUM SERPL-MCNC: 9.2 MG/DL (ref 8.5–10.5)
CHLORIDE BLD-SCNC: 109 MMOL/L (ref 98–107)
CO2 SERPL-SCNC: 24 MMOL/L (ref 22–31)
CREAT SERPL-MCNC: 1.12 MG/DL (ref 0.7–1.3)
GFR SERPL CREATININE-BSD FRML MDRD: >60 ML/MIN/1.73M2
GLUCOSE BLD-MCNC: 120 MG/DL (ref 70–125)
POTASSIUM BLD-SCNC: 4.4 MMOL/L (ref 3.5–5)
SODIUM SERPL-SCNC: 140 MMOL/L (ref 136–145)

## 2018-03-15 ENCOUNTER — COMMUNICATION - HEALTHEAST (OUTPATIENT)
Dept: CARDIOLOGY | Facility: CLINIC | Age: 73
End: 2018-03-15

## 2018-03-19 ENCOUNTER — RECORDS - HEALTHEAST (OUTPATIENT)
Dept: ADMINISTRATIVE | Facility: OTHER | Age: 73
End: 2018-03-19

## 2018-03-21 ENCOUNTER — OFFICE VISIT - HEALTHEAST (OUTPATIENT)
Dept: GERIATRICS | Facility: CLINIC | Age: 73
End: 2018-03-21

## 2018-03-21 DIAGNOSIS — E11.621 TYPE 2 DIABETES MELLITUS WITH FOOT ULCER, WITHOUT LONG-TERM CURRENT USE OF INSULIN (H): ICD-10-CM

## 2018-03-21 DIAGNOSIS — L08.9 WOUND INFECTION: ICD-10-CM

## 2018-03-21 DIAGNOSIS — T14.8XXA WOUND INFECTION: ICD-10-CM

## 2018-03-21 DIAGNOSIS — G63 POLYNEUROPATHY ASSOCIATED WITH UNDERLYING DISEASE (H): ICD-10-CM

## 2018-03-21 DIAGNOSIS — Z89.432 S/P TRANSMETATARSAL AMPUTATION OF FOOT, LEFT (H): ICD-10-CM

## 2018-03-21 DIAGNOSIS — L97.509 TYPE 2 DIABETES MELLITUS WITH FOOT ULCER, WITHOUT LONG-TERM CURRENT USE OF INSULIN (H): ICD-10-CM

## 2018-03-21 DIAGNOSIS — B37.49 CANDIDIASIS OF PERINEUM: ICD-10-CM

## 2018-03-21 DIAGNOSIS — I89.0 ACQUIRED LYMPHEDEMA OF LOWER EXTREMITY: ICD-10-CM

## 2018-03-21 DIAGNOSIS — I50.30 HEART FAILURE WITH PRESERVED EJECTION FRACTION (H): ICD-10-CM

## 2018-03-26 ENCOUNTER — AMBULATORY - HEALTHEAST (OUTPATIENT)
Dept: GERIATRICS | Facility: CLINIC | Age: 73
End: 2018-03-26

## 2018-03-26 ENCOUNTER — COMMUNICATION - HEALTHEAST (OUTPATIENT)
Dept: CARDIOLOGY | Facility: CLINIC | Age: 73
End: 2018-03-26

## 2018-03-29 ENCOUNTER — AMBULATORY - HEALTHEAST (OUTPATIENT)
Dept: CARDIOLOGY | Facility: CLINIC | Age: 73
End: 2018-03-29

## 2018-03-29 ENCOUNTER — RECORDS - HEALTHEAST (OUTPATIENT)
Dept: ADMINISTRATIVE | Facility: OTHER | Age: 73
End: 2018-03-29

## 2018-04-02 ENCOUNTER — OFFICE VISIT - HEALTHEAST (OUTPATIENT)
Dept: VASCULAR SURGERY | Facility: CLINIC | Age: 73
End: 2018-04-02

## 2018-04-02 DIAGNOSIS — G63 POLYNEUROPATHY ASSOCIATED WITH UNDERLYING DISEASE (H): ICD-10-CM

## 2018-04-02 DIAGNOSIS — L97.509 TYPE 2 DIABETES MELLITUS WITH FOOT ULCER, WITHOUT LONG-TERM CURRENT USE OF INSULIN (H): ICD-10-CM

## 2018-04-02 DIAGNOSIS — E11.621 TYPE 2 DIABETES MELLITUS WITH FOOT ULCER, WITHOUT LONG-TERM CURRENT USE OF INSULIN (H): ICD-10-CM

## 2018-04-02 DIAGNOSIS — E78.5 DYSLIPIDEMIA, GOAL LDL BELOW 70: ICD-10-CM

## 2018-04-02 DIAGNOSIS — I87.303 VENOUS HYPERTENSION, CHRONIC, BILATERAL: ICD-10-CM

## 2018-04-02 DIAGNOSIS — L97.501 ULCER OF FOOT, LIMITED TO BREAKDOWN OF SKIN, UNSPECIFIED LATERALITY (H): ICD-10-CM

## 2018-04-02 DIAGNOSIS — I89.0 ACQUIRED LYMPHEDEMA OF LOWER EXTREMITY: ICD-10-CM

## 2018-04-06 ENCOUNTER — OFFICE VISIT - HEALTHEAST (OUTPATIENT)
Dept: VASCULAR SURGERY | Facility: CLINIC | Age: 73
End: 2018-04-06

## 2018-04-06 DIAGNOSIS — I73.9 PAD (PERIPHERAL ARTERY DISEASE) (H): ICD-10-CM

## 2018-04-06 DIAGNOSIS — L97.509 ULCER OF FOOT (H): ICD-10-CM

## 2018-04-06 DIAGNOSIS — I89.0 ACQUIRED LYMPHEDEMA OF LOWER EXTREMITY: ICD-10-CM

## 2018-04-11 ENCOUNTER — RECORDS - HEALTHEAST (OUTPATIENT)
Dept: LAB | Facility: CLINIC | Age: 73
End: 2018-04-11

## 2018-04-11 LAB
RHEUMATOID FACT SERPL-ACNC: <15 IU/ML (ref 0–30)
TSH SERPL DL<=0.005 MIU/L-ACNC: 4.46 UIU/ML (ref 0.3–5)
TSH SERPL-ACNC: 4.46 UIU/ML (ref 0.3–5)
VIT B12 SERPL-MCNC: 300 PG/ML (ref 213–816)

## 2018-04-12 LAB — ANA SER QL: 1.5 U

## 2018-04-16 ENCOUNTER — OFFICE VISIT - HEALTHEAST (OUTPATIENT)
Dept: VASCULAR SURGERY | Facility: CLINIC | Age: 73
End: 2018-04-16

## 2018-04-16 DIAGNOSIS — L08.9 LOCAL INFECTION OF WOUND: ICD-10-CM

## 2018-04-16 DIAGNOSIS — Z89.432 S/P TRANSMETATARSAL AMPUTATION OF FOOT, LEFT (H): ICD-10-CM

## 2018-04-16 DIAGNOSIS — G63 POLYNEUROPATHY ASSOCIATED WITH UNDERLYING DISEASE (H): ICD-10-CM

## 2018-04-16 DIAGNOSIS — I73.9 PAD (PERIPHERAL ARTERY DISEASE) (H): ICD-10-CM

## 2018-04-16 DIAGNOSIS — T14.8XXA LOCAL INFECTION OF WOUND: ICD-10-CM

## 2018-04-16 DIAGNOSIS — I25.5 ISCHEMIC CARDIOMYOPATHY: ICD-10-CM

## 2018-04-16 DIAGNOSIS — L97.501 ULCER OF FOOT, LIMITED TO BREAKDOWN OF SKIN, UNSPECIFIED LATERALITY (H): ICD-10-CM

## 2018-04-16 DIAGNOSIS — I87.303 VENOUS HYPERTENSION, CHRONIC, BILATERAL: ICD-10-CM

## 2018-04-17 LAB
SS-A/RO AUTOANTIBODIES - HISTORICAL: 13 EU
SS-B/LA AUTOANTIBODIES - HISTORICAL: 1 EU

## 2018-04-18 ENCOUNTER — OFFICE VISIT - HEALTHEAST (OUTPATIENT)
Dept: GERIATRICS | Facility: CLINIC | Age: 73
End: 2018-04-18

## 2018-04-18 DIAGNOSIS — Z89.432 S/P TRANSMETATARSAL AMPUTATION OF FOOT, LEFT (H): ICD-10-CM

## 2018-04-18 DIAGNOSIS — I48.19 PERSISTENT ATRIAL FIBRILLATION (H): ICD-10-CM

## 2018-04-18 DIAGNOSIS — E11.621 TYPE 2 DIABETES MELLITUS WITH FOOT ULCER, WITHOUT LONG-TERM CURRENT USE OF INSULIN (H): ICD-10-CM

## 2018-04-18 DIAGNOSIS — I87.303 VENOUS HYPERTENSION, CHRONIC, BILATERAL: ICD-10-CM

## 2018-04-18 DIAGNOSIS — I25.810 CORONARY ARTERY DISEASE INVOLVING CORONARY BYPASS GRAFT OF NATIVE HEART WITHOUT ANGINA PECTORIS: ICD-10-CM

## 2018-04-18 DIAGNOSIS — I25.5 ISCHEMIC CARDIOMYOPATHY: ICD-10-CM

## 2018-04-18 DIAGNOSIS — G63 POLYNEUROPATHY ASSOCIATED WITH UNDERLYING DISEASE (H): ICD-10-CM

## 2018-04-18 DIAGNOSIS — B37.49 CANDIDIASIS OF PERINEUM: ICD-10-CM

## 2018-04-18 DIAGNOSIS — L97.509 TYPE 2 DIABETES MELLITUS WITH FOOT ULCER, WITHOUT LONG-TERM CURRENT USE OF INSULIN (H): ICD-10-CM

## 2018-04-19 ENCOUNTER — AMBULATORY - HEALTHEAST (OUTPATIENT)
Dept: VASCULAR SURGERY | Facility: CLINIC | Age: 73
End: 2018-04-19

## 2018-04-19 ENCOUNTER — RECORDS - HEALTHEAST (OUTPATIENT)
Dept: LAB | Facility: CLINIC | Age: 73
End: 2018-04-19

## 2018-04-19 DIAGNOSIS — T14.8XXA LOCAL INFECTION OF WOUND: ICD-10-CM

## 2018-04-19 DIAGNOSIS — L08.9 LOCAL INFECTION OF WOUND: ICD-10-CM

## 2018-04-19 LAB
BACTERIA SPEC CULT: ABNORMAL
BACTERIA SPEC CULT: ABNORMAL
GRAM STAIN RESULT: ABNORMAL
GRAM STAIN RESULT: ABNORMAL

## 2018-04-20 ENCOUNTER — COMMUNICATION - HEALTHEAST (OUTPATIENT)
Dept: VASCULAR SURGERY | Facility: CLINIC | Age: 73
End: 2018-04-20

## 2018-04-20 LAB
ANION GAP SERPL CALCULATED.3IONS-SCNC: 9 MMOL/L (ref 5–18)
BASOPHILS # BLD AUTO: 0.1 THOU/UL (ref 0–0.2)
BASOPHILS NFR BLD AUTO: 1 % (ref 0–2)
BUN SERPL-MCNC: 40 MG/DL (ref 8–28)
CALCIUM SERPL-MCNC: 9.7 MG/DL (ref 8.5–10.5)
CHLORIDE BLD-SCNC: 105 MMOL/L (ref 98–107)
CO2 SERPL-SCNC: 23 MMOL/L (ref 22–31)
CREAT SERPL-MCNC: 1.37 MG/DL (ref 0.7–1.3)
CREAT SERPL-MCNC: 1.37 MG/DL (ref 0.7–1.3)
EOSINOPHIL # BLD AUTO: 0.2 THOU/UL (ref 0–0.4)
EOSINOPHIL NFR BLD AUTO: 3 % (ref 0–6)
ERYTHROCYTE [DISTWIDTH] IN BLOOD BY AUTOMATED COUNT: 14.9 % (ref 11–14.5)
GFR SERPL CREATININE-BSD FRML MDRD: 51 ML/MIN/1.73M2
GFR SERPL CREATININE-BSD FRML MDRD: 51 ML/MIN/1.73M2
GLUCOSE BLD-MCNC: 217 MG/DL (ref 70–125)
GLUCOSE SERPL-MCNC: 217 MG/DL (ref 70–125)
HBA1C MFR BLD: 7.3 % (ref 4.2–6.1)
HBA1C MFR BLD: 7.3 % (ref 4.2–6.1)
HCT VFR BLD AUTO: 40.1 % (ref 40–54)
HGB BLD-MCNC: 12.8 G/DL (ref 14–18)
LYMPHOCYTES # BLD AUTO: 1.2 THOU/UL (ref 0.8–4.4)
LYMPHOCYTES NFR BLD AUTO: 17 % (ref 20–40)
MCH RBC QN AUTO: 27.8 PG (ref 27–34)
MCHC RBC AUTO-ENTMCNC: 31.9 G/DL (ref 32–36)
MCV RBC AUTO: 87 FL (ref 80–100)
MONOCYTES # BLD AUTO: 0.6 THOU/UL (ref 0–0.9)
MONOCYTES NFR BLD AUTO: 9 % (ref 2–10)
NEUTROPHILS # BLD AUTO: 5.1 THOU/UL (ref 2–7.7)
NEUTROPHILS NFR BLD AUTO: 71 % (ref 50–70)
PLATELET # BLD AUTO: 164 THOU/UL (ref 140–440)
PMV BLD AUTO: 10.5 FL (ref 8.5–12.5)
POTASSIUM BLD-SCNC: 4.6 MMOL/L (ref 3.5–5)
POTASSIUM SERPL-SCNC: 4.6 MMOL/L (ref 3.5–5)
RBC # BLD AUTO: 4.6 MILL/UL (ref 4.4–6.2)
SODIUM SERPL-SCNC: 137 MMOL/L (ref 136–145)
WBC: 7.3 THOU/UL (ref 4–11)

## 2018-04-25 ENCOUNTER — OFFICE VISIT - HEALTHEAST (OUTPATIENT)
Dept: CARDIOLOGY | Facility: CLINIC | Age: 73
End: 2018-04-25

## 2018-04-25 ENCOUNTER — AMBULATORY - HEALTHEAST (OUTPATIENT)
Dept: CARDIOLOGY | Facility: CLINIC | Age: 73
End: 2018-04-25

## 2018-04-25 DIAGNOSIS — I48.19 PERSISTENT ATRIAL FIBRILLATION (H): ICD-10-CM

## 2018-04-25 DIAGNOSIS — I50.30 HEART FAILURE WITH PRESERVED EJECTION FRACTION (H): ICD-10-CM

## 2018-04-25 ASSESSMENT — MIFFLIN-ST. JEOR: SCORE: 1663.86

## 2018-04-30 ENCOUNTER — RECORDS - HEALTHEAST (OUTPATIENT)
Dept: ADMINISTRATIVE | Facility: OTHER | Age: 73
End: 2018-04-30

## 2018-04-30 ENCOUNTER — OFFICE VISIT - HEALTHEAST (OUTPATIENT)
Dept: VASCULAR SURGERY | Facility: CLINIC | Age: 73
End: 2018-04-30

## 2018-04-30 DIAGNOSIS — Z89.432 S/P TRANSMETATARSAL AMPUTATION OF FOOT, LEFT (H): ICD-10-CM

## 2018-04-30 DIAGNOSIS — G63 POLYNEUROPATHY ASSOCIATED WITH UNDERLYING DISEASE (H): ICD-10-CM

## 2018-04-30 DIAGNOSIS — I87.303 VENOUS HYPERTENSION, CHRONIC, BILATERAL: ICD-10-CM

## 2018-04-30 DIAGNOSIS — L97.501 ULCER OF FOOT, LIMITED TO BREAKDOWN OF SKIN, UNSPECIFIED LATERALITY (H): ICD-10-CM

## 2018-05-04 ENCOUNTER — COMMUNICATION - HEALTHEAST (OUTPATIENT)
Dept: VASCULAR SURGERY | Facility: CLINIC | Age: 73
End: 2018-05-04

## 2018-05-04 ENCOUNTER — OFFICE VISIT - HEALTHEAST (OUTPATIENT)
Dept: VASCULAR SURGERY | Facility: CLINIC | Age: 73
End: 2018-05-04

## 2018-05-04 DIAGNOSIS — I73.9 PVD (PERIPHERAL VASCULAR DISEASE) (H): ICD-10-CM

## 2018-05-11 ENCOUNTER — RECORDS - HEALTHEAST (OUTPATIENT)
Dept: LAB | Facility: CLINIC | Age: 73
End: 2018-05-11

## 2018-05-14 ENCOUNTER — OFFICE VISIT - HEALTHEAST (OUTPATIENT)
Dept: VASCULAR SURGERY | Facility: CLINIC | Age: 73
End: 2018-05-14

## 2018-05-14 DIAGNOSIS — E11.621 TYPE 2 DIABETES MELLITUS WITH FOOT ULCER, WITHOUT LONG-TERM CURRENT USE OF INSULIN (H): ICD-10-CM

## 2018-05-14 DIAGNOSIS — L97.501 ULCER OF FOOT, LIMITED TO BREAKDOWN OF SKIN, UNSPECIFIED LATERALITY (H): ICD-10-CM

## 2018-05-14 DIAGNOSIS — L97.509 TYPE 2 DIABETES MELLITUS WITH FOOT ULCER, WITHOUT LONG-TERM CURRENT USE OF INSULIN (H): ICD-10-CM

## 2018-05-14 DIAGNOSIS — Z89.432 S/P TRANSMETATARSAL AMPUTATION OF FOOT, LEFT (H): ICD-10-CM

## 2018-05-14 DIAGNOSIS — G63 POLYNEUROPATHY ASSOCIATED WITH UNDERLYING DISEASE (H): ICD-10-CM

## 2018-05-14 LAB
CREAT SERPL-MCNC: 1.35 MG/DL (ref 0.7–1.3)
GFR SERPL CREATININE-BSD FRML MDRD: 52 ML/MIN/1.73M2

## 2018-05-15 ENCOUNTER — OFFICE VISIT - HEALTHEAST (OUTPATIENT)
Dept: GERIATRICS | Facility: CLINIC | Age: 73
End: 2018-05-15

## 2018-05-15 DIAGNOSIS — B37.49 CANDIDIASIS OF PERINEUM: ICD-10-CM

## 2018-05-17 ENCOUNTER — RECORDS - HEALTHEAST (OUTPATIENT)
Dept: LAB | Facility: CLINIC | Age: 73
End: 2018-05-17

## 2018-05-18 LAB
ALBUMIN SERPL-MCNC: 3.5 G/DL (ref 3.5–5)
ALP SERPL-CCNC: 94 U/L (ref 45–120)
ALT SERPL W P-5'-P-CCNC: 12 U/L (ref 0–45)
AST SERPL W P-5'-P-CCNC: 15 U/L (ref 0–40)
BILIRUB DIRECT SERPL-MCNC: 0.3 MG/DL
BILIRUB SERPL-MCNC: 0.8 MG/DL (ref 0–1)
PROT SERPL-MCNC: 7.8 G/DL (ref 6–8)

## 2018-05-30 ENCOUNTER — OFFICE VISIT - HEALTHEAST (OUTPATIENT)
Dept: GERIATRICS | Facility: CLINIC | Age: 73
End: 2018-05-30

## 2018-05-30 DIAGNOSIS — Z78.9 UNABLE TO FUNCTION INDEPENDENTLY: ICD-10-CM

## 2018-05-30 DIAGNOSIS — B37.49 CANDIDIASIS OF PERINEUM: ICD-10-CM

## 2018-05-30 DIAGNOSIS — G63 POLYNEUROPATHY ASSOCIATED WITH UNDERLYING DISEASE (H): ICD-10-CM

## 2018-05-30 DIAGNOSIS — I48.19 PERSISTENT ATRIAL FIBRILLATION (H): ICD-10-CM

## 2018-05-30 DIAGNOSIS — I50.30 HEART FAILURE WITH PRESERVED EJECTION FRACTION (H): ICD-10-CM

## 2018-05-30 DIAGNOSIS — Z89.432 S/P TRANSMETATARSAL AMPUTATION OF FOOT, LEFT (H): ICD-10-CM

## 2018-05-30 DIAGNOSIS — L97.509 TYPE 2 DIABETES MELLITUS WITH FOOT ULCER, WITHOUT LONG-TERM CURRENT USE OF INSULIN (H): ICD-10-CM

## 2018-05-30 DIAGNOSIS — L97.501 ULCER OF FOOT, LIMITED TO BREAKDOWN OF SKIN, UNSPECIFIED LATERALITY (H): ICD-10-CM

## 2018-05-30 DIAGNOSIS — I25.810 CORONARY ARTERY DISEASE INVOLVING CORONARY BYPASS GRAFT OF NATIVE HEART WITHOUT ANGINA PECTORIS: ICD-10-CM

## 2018-05-30 DIAGNOSIS — I89.0 ACQUIRED LYMPHEDEMA OF LOWER EXTREMITY: ICD-10-CM

## 2018-05-30 DIAGNOSIS — I87.303 VENOUS HYPERTENSION, CHRONIC, BILATERAL: ICD-10-CM

## 2018-05-30 DIAGNOSIS — E11.621 TYPE 2 DIABETES MELLITUS WITH FOOT ULCER, WITHOUT LONG-TERM CURRENT USE OF INSULIN (H): ICD-10-CM

## 2018-05-31 ENCOUNTER — OFFICE VISIT - HEALTHEAST (OUTPATIENT)
Dept: VASCULAR SURGERY | Facility: CLINIC | Age: 73
End: 2018-05-31

## 2018-05-31 DIAGNOSIS — L97.501 ULCER OF FOOT, LIMITED TO BREAKDOWN OF SKIN, UNSPECIFIED LATERALITY (H): ICD-10-CM

## 2018-05-31 DIAGNOSIS — I73.9 PAD (PERIPHERAL ARTERY DISEASE) (H): ICD-10-CM

## 2018-05-31 DIAGNOSIS — G63 POLYNEUROPATHY ASSOCIATED WITH UNDERLYING DISEASE (H): ICD-10-CM

## 2018-05-31 DIAGNOSIS — I87.2 VENOUS STASIS DERMATITIS OF LEFT LOWER EXTREMITY: ICD-10-CM

## 2018-05-31 DIAGNOSIS — E11.9 DIABETES MELLITUS, TYPE 2 (H): ICD-10-CM

## 2018-05-31 DIAGNOSIS — L25.9 CONTACT DERMATITIS: ICD-10-CM

## 2018-05-31 DIAGNOSIS — Z89.432 S/P TRANSMETATARSAL AMPUTATION OF FOOT, LEFT (H): ICD-10-CM

## 2018-05-31 DIAGNOSIS — I87.303 VENOUS HYPERTENSION, CHRONIC, BILATERAL: ICD-10-CM

## 2018-06-02 ASSESSMENT — MIFFLIN-ST. JEOR: SCORE: 1649.34

## 2018-06-05 ENCOUNTER — OFFICE VISIT - HEALTHEAST (OUTPATIENT)
Dept: GERIATRICS | Facility: CLINIC | Age: 73
End: 2018-06-05

## 2018-06-05 DIAGNOSIS — L97.509 TYPE 2 DIABETES MELLITUS WITH FOOT ULCER, WITHOUT LONG-TERM CURRENT USE OF INSULIN (H): ICD-10-CM

## 2018-06-05 DIAGNOSIS — B37.49 CANDIDIASIS OF PERINEUM: ICD-10-CM

## 2018-06-05 DIAGNOSIS — Z89.432 S/P TRANSMETATARSAL AMPUTATION OF FOOT, LEFT (H): ICD-10-CM

## 2018-06-05 DIAGNOSIS — E11.621 TYPE 2 DIABETES MELLITUS WITH FOOT ULCER, WITHOUT LONG-TERM CURRENT USE OF INSULIN (H): ICD-10-CM

## 2018-06-13 ENCOUNTER — AMBULATORY - HEALTHEAST (OUTPATIENT)
Dept: GERIATRICS | Facility: CLINIC | Age: 73
End: 2018-06-13

## 2018-06-13 ENCOUNTER — OFFICE VISIT - HEALTHEAST (OUTPATIENT)
Dept: CARDIOLOGY | Facility: CLINIC | Age: 73
End: 2018-06-13

## 2018-06-13 DIAGNOSIS — I25.83 CORONARY ARTERY DISEASE DUE TO LIPID RICH PLAQUE: ICD-10-CM

## 2018-06-13 DIAGNOSIS — I25.10 CORONARY ARTERY DISEASE DUE TO LIPID RICH PLAQUE: ICD-10-CM

## 2018-06-13 DIAGNOSIS — I50.30 HEART FAILURE WITH PRESERVED EJECTION FRACTION (H): ICD-10-CM

## 2018-06-13 DIAGNOSIS — E78.5 DYSLIPIDEMIA, GOAL LDL BELOW 70: ICD-10-CM

## 2018-06-13 ASSESSMENT — MIFFLIN-ST. JEOR: SCORE: 1647.98

## 2018-06-14 ENCOUNTER — COMMUNICATION - HEALTHEAST (OUTPATIENT)
Dept: GERIATRICS | Facility: CLINIC | Age: 73
End: 2018-06-14

## 2018-06-20 ENCOUNTER — OFFICE VISIT - HEALTHEAST (OUTPATIENT)
Dept: GERIATRICS | Facility: CLINIC | Age: 73
End: 2018-06-20

## 2018-06-20 ENCOUNTER — TRANSFERRED RECORDS (OUTPATIENT)
Dept: HEALTH INFORMATION MANAGEMENT | Facility: CLINIC | Age: 73
End: 2018-06-20

## 2018-06-20 DIAGNOSIS — I73.9 PAD (PERIPHERAL ARTERY DISEASE) (H): ICD-10-CM

## 2018-06-20 DIAGNOSIS — G63 POLYNEUROPATHY ASSOCIATED WITH UNDERLYING DISEASE (H): ICD-10-CM

## 2018-06-20 DIAGNOSIS — I89.0 ACQUIRED LYMPHEDEMA OF LOWER EXTREMITY: ICD-10-CM

## 2018-06-20 DIAGNOSIS — I50.30 HEART FAILURE WITH PRESERVED EJECTION FRACTION (H): ICD-10-CM

## 2018-06-20 DIAGNOSIS — E78.5 DYSLIPIDEMIA, GOAL LDL BELOW 70: ICD-10-CM

## 2018-06-20 DIAGNOSIS — Z91.199 MEDICALLY NONCOMPLIANT: ICD-10-CM

## 2018-06-20 DIAGNOSIS — I25.10 CORONARY ARTERY DISEASE DUE TO LIPID RICH PLAQUE: ICD-10-CM

## 2018-06-20 DIAGNOSIS — I48.92 ATRIAL FLUTTER, UNSPECIFIED TYPE (H): ICD-10-CM

## 2018-06-20 DIAGNOSIS — I48.19 PERSISTENT ATRIAL FIBRILLATION (H): ICD-10-CM

## 2018-06-20 DIAGNOSIS — I10 ESSENTIAL HYPERTENSION: ICD-10-CM

## 2018-06-20 DIAGNOSIS — E11.52 TYPE 2 DIABETES MELLITUS WITH DIABETIC PERIPHERAL ANGIOPATHY AND GANGRENE, WITHOUT LONG-TERM CURRENT USE OF INSULIN (H): ICD-10-CM

## 2018-06-20 DIAGNOSIS — S91.302S WOUND, OPEN, FOOT, LEFT, SEQUELA: ICD-10-CM

## 2018-06-20 DIAGNOSIS — I25.83 CORONARY ARTERY DISEASE DUE TO LIPID RICH PLAQUE: ICD-10-CM

## 2018-06-21 ENCOUNTER — RECORDS - HEALTHEAST (OUTPATIENT)
Dept: ADMINISTRATIVE | Facility: OTHER | Age: 73
End: 2018-06-21

## 2018-06-21 ENCOUNTER — OFFICE VISIT - HEALTHEAST (OUTPATIENT)
Dept: VASCULAR SURGERY | Facility: CLINIC | Age: 73
End: 2018-06-21

## 2018-06-21 DIAGNOSIS — L97.501 ULCER OF FOOT, LIMITED TO BREAKDOWN OF SKIN, UNSPECIFIED LATERALITY (H): ICD-10-CM

## 2018-06-21 DIAGNOSIS — I87.2 VENOUS STASIS DERMATITIS OF LEFT LOWER EXTREMITY: ICD-10-CM

## 2018-06-21 DIAGNOSIS — G63 POLYNEUROPATHY ASSOCIATED WITH UNDERLYING DISEASE (H): ICD-10-CM

## 2018-06-21 DIAGNOSIS — I89.0 ACQUIRED LYMPHEDEMA OF LOWER EXTREMITY: ICD-10-CM

## 2018-06-21 DIAGNOSIS — E11.52 TYPE 2 DIABETES MELLITUS WITH DIABETIC PERIPHERAL ANGIOPATHY AND GANGRENE, WITHOUT LONG-TERM CURRENT USE OF INSULIN (H): ICD-10-CM

## 2018-06-21 DIAGNOSIS — I87.303 VENOUS HYPERTENSION, CHRONIC, BILATERAL: ICD-10-CM

## 2018-06-22 ENCOUNTER — COMMUNICATION - HEALTHEAST (OUTPATIENT)
Dept: VASCULAR SURGERY | Facility: CLINIC | Age: 73
End: 2018-06-22

## 2018-06-25 ENCOUNTER — APPOINTMENT (OUTPATIENT)
Dept: GENERAL RADIOLOGY | Facility: CLINIC | Age: 73
DRG: 871 | End: 2018-06-25
Attending: EMERGENCY MEDICINE
Payer: MEDICARE

## 2018-06-25 ENCOUNTER — APPOINTMENT (OUTPATIENT)
Dept: CT IMAGING | Facility: CLINIC | Age: 73
DRG: 871 | End: 2018-06-25
Attending: EMERGENCY MEDICINE
Payer: MEDICARE

## 2018-06-25 ENCOUNTER — HOSPITAL ENCOUNTER (INPATIENT)
Facility: CLINIC | Age: 73
LOS: 2 days | Discharge: NURSING, REHAB OR LONG-TERM CARE | DRG: 871 | End: 2018-06-30
Attending: EMERGENCY MEDICINE | Admitting: INTERNAL MEDICINE
Payer: MEDICARE

## 2018-06-25 DIAGNOSIS — I25.10 ATHEROSCLEROSIS OF NATIVE CORONARY ARTERY, ANGINA PRESENCE UNSPECIFIED, UNSPECIFIED WHETHER NATIVE OR TRANSPLANTED HEART: ICD-10-CM

## 2018-06-25 DIAGNOSIS — E11.9 TYPE 2 DIABETES MELLITUS WITHOUT COMPLICATION, UNSPECIFIED LONG TERM INSULIN USE STATUS: ICD-10-CM

## 2018-06-25 DIAGNOSIS — B37.2 CANDIDAL INTERTRIGO: Primary | ICD-10-CM

## 2018-06-25 DIAGNOSIS — L53.9 ERYTHEMATOUS CONDITION: ICD-10-CM

## 2018-06-25 DIAGNOSIS — Z95.1 POSTSURGICAL AORTOCORONARY BYPASS STATUS: ICD-10-CM

## 2018-06-25 DIAGNOSIS — J18.9 PNEUMONIA DUE TO INFECTIOUS ORGANISM, UNSPECIFIED LATERALITY, UNSPECIFIED PART OF LUNG: ICD-10-CM

## 2018-06-25 DIAGNOSIS — Z79.4 ENCOUNTER FOR LONG-TERM (CURRENT) USE OF INSULIN (H): ICD-10-CM

## 2018-06-25 DIAGNOSIS — R41.82 ALTERED MENTAL STATUS, UNSPECIFIED ALTERED MENTAL STATUS TYPE: ICD-10-CM

## 2018-06-25 DIAGNOSIS — Z79.01 LONG-TERM (CURRENT) USE OF ANTICOAGULANTS: ICD-10-CM

## 2018-06-25 DIAGNOSIS — I48.91 ATRIAL FIBRILLATION, UNSPECIFIED TYPE (H): ICD-10-CM

## 2018-06-25 DIAGNOSIS — B37.2 CANDIDIASIS OF SKIN AND NAILS: ICD-10-CM

## 2018-06-25 DIAGNOSIS — I10 BENIGN ESSENTIAL HYPERTENSION: ICD-10-CM

## 2018-06-25 LAB
ALBUMIN SERPL-MCNC: 3.9 G/DL (ref 3.4–5)
ALP SERPL-CCNC: 104 U/L (ref 40–150)
ALT SERPL W P-5'-P-CCNC: 23 U/L (ref 0–70)
ANION GAP SERPL CALCULATED.3IONS-SCNC: 12 MMOL/L (ref 3–14)
AST SERPL W P-5'-P-CCNC: 27 U/L (ref 0–45)
BASOPHILS # BLD AUTO: 0 10E9/L (ref 0–0.2)
BASOPHILS NFR BLD AUTO: 0.1 %
BILIRUB SERPL-MCNC: 1 MG/DL (ref 0.2–1.3)
BUN SERPL-MCNC: 48 MG/DL (ref 7–30)
CALCIUM SERPL-MCNC: 9 MG/DL (ref 8.5–10.1)
CHLORIDE SERPL-SCNC: 104 MMOL/L (ref 94–109)
CO2 BLDCOV-SCNC: 20 MMOL/L (ref 21–28)
CO2 SERPL-SCNC: 19 MMOL/L (ref 20–32)
CREAT SERPL-MCNC: 1.33 MG/DL (ref 0.66–1.25)
DIFFERENTIAL METHOD BLD: ABNORMAL
EOSINOPHIL # BLD AUTO: 0.2 10E9/L (ref 0–0.7)
EOSINOPHIL NFR BLD AUTO: 0.7 %
ERYTHROCYTE [DISTWIDTH] IN BLOOD BY AUTOMATED COUNT: 14 % (ref 10–15)
GFR SERPL CREATININE-BSD FRML MDRD: 53 ML/MIN/1.7M2
GLUCOSE BLDC GLUCOMTR-MCNC: 185 MG/DL (ref 70–99)
GLUCOSE SERPL-MCNC: 179 MG/DL (ref 70–99)
HCT VFR BLD AUTO: 39.4 % (ref 40–53)
HGB BLD-MCNC: 13.1 G/DL (ref 13.3–17.7)
IMM GRANULOCYTES # BLD: 0.1 10E9/L (ref 0–0.4)
IMM GRANULOCYTES NFR BLD: 0.3 %
INR PPP: 2.42 (ref 0.86–1.14)
LACTATE BLD-SCNC: 1.4 MMOL/L (ref 0.7–2.1)
LYMPHOCYTES # BLD AUTO: 0.5 10E9/L (ref 0.8–5.3)
LYMPHOCYTES NFR BLD AUTO: 2.4 %
MCH RBC QN AUTO: 29.4 PG (ref 26.5–33)
MCHC RBC AUTO-ENTMCNC: 33.2 G/DL (ref 31.5–36.5)
MCV RBC AUTO: 88 FL (ref 78–100)
MONOCYTES # BLD AUTO: 1.1 10E9/L (ref 0–1.3)
MONOCYTES NFR BLD AUTO: 5 %
NEUTROPHILS # BLD AUTO: 19.1 10E9/L (ref 1.6–8.3)
NEUTROPHILS NFR BLD AUTO: 91.5 %
NRBC # BLD AUTO: 0 10*3/UL
NRBC BLD AUTO-RTO: 0 /100
PCO2 BLDV: 33 MM HG (ref 40–50)
PH BLDV: 7.39 PH (ref 7.32–7.43)
PLATELET # BLD AUTO: 126 10E9/L (ref 150–450)
PO2 BLDV: 30 MM HG (ref 25–47)
POTASSIUM SERPL-SCNC: 5.7 MMOL/L (ref 3.4–5.3)
PROT SERPL-MCNC: 8.8 G/DL (ref 6.8–8.8)
RBC # BLD AUTO: 4.46 10E12/L (ref 4.4–5.9)
SAO2 % BLDV FROM PO2: 57 %
SODIUM SERPL-SCNC: 134 MMOL/L (ref 133–144)
WBC # BLD AUTO: 20.9 10E9/L (ref 4–11)

## 2018-06-25 PROCEDURE — 80053 COMPREHEN METABOLIC PANEL: CPT | Performed by: EMERGENCY MEDICINE

## 2018-06-25 PROCEDURE — 36415 COLL VENOUS BLD VENIPUNCTURE: CPT | Performed by: EMERGENCY MEDICINE

## 2018-06-25 PROCEDURE — 25000128 H RX IP 250 OP 636: Performed by: EMERGENCY MEDICINE

## 2018-06-25 PROCEDURE — 80307 DRUG TEST PRSMV CHEM ANLYZR: CPT | Performed by: EMERGENCY MEDICINE

## 2018-06-25 PROCEDURE — 85610 PROTHROMBIN TIME: CPT | Performed by: EMERGENCY MEDICINE

## 2018-06-25 PROCEDURE — 00000146 ZZHCL STATISTIC GLUCOSE BY METER IP

## 2018-06-25 PROCEDURE — 71045 X-RAY EXAM CHEST 1 VIEW: CPT

## 2018-06-25 PROCEDURE — 70450 CT HEAD/BRAIN W/O DYE: CPT

## 2018-06-25 PROCEDURE — 99285 EMERGENCY DEPT VISIT HI MDM: CPT | Mod: 25 | Performed by: EMERGENCY MEDICINE

## 2018-06-25 PROCEDURE — 87040 BLOOD CULTURE FOR BACTERIA: CPT | Performed by: EMERGENCY MEDICINE

## 2018-06-25 PROCEDURE — 93010 ELECTROCARDIOGRAM REPORT: CPT | Mod: Z6 | Performed by: EMERGENCY MEDICINE

## 2018-06-25 PROCEDURE — 85025 COMPLETE CBC W/AUTO DIFF WBC: CPT | Performed by: EMERGENCY MEDICINE

## 2018-06-25 PROCEDURE — 87086 URINE CULTURE/COLONY COUNT: CPT | Performed by: EMERGENCY MEDICINE

## 2018-06-25 PROCEDURE — A9270 NON-COVERED ITEM OR SERVICE: HCPCS | Mod: GY | Performed by: EMERGENCY MEDICINE

## 2018-06-25 PROCEDURE — 93005 ELECTROCARDIOGRAM TRACING: CPT | Performed by: EMERGENCY MEDICINE

## 2018-06-25 PROCEDURE — 96365 THER/PROPH/DIAG IV INF INIT: CPT | Performed by: EMERGENCY MEDICINE

## 2018-06-25 PROCEDURE — 25000132 ZZH RX MED GY IP 250 OP 250 PS 637: Mod: GY | Performed by: EMERGENCY MEDICINE

## 2018-06-25 PROCEDURE — 83605 ASSAY OF LACTIC ACID: CPT

## 2018-06-25 PROCEDURE — 96366 THER/PROPH/DIAG IV INF ADDON: CPT | Performed by: EMERGENCY MEDICINE

## 2018-06-25 PROCEDURE — 96361 HYDRATE IV INFUSION ADD-ON: CPT | Performed by: EMERGENCY MEDICINE

## 2018-06-25 PROCEDURE — 80320 DRUG SCREEN QUANTALCOHOLS: CPT | Performed by: EMERGENCY MEDICINE

## 2018-06-25 PROCEDURE — 81001 URINALYSIS AUTO W/SCOPE: CPT | Performed by: EMERGENCY MEDICINE

## 2018-06-25 PROCEDURE — 82803 BLOOD GASES ANY COMBINATION: CPT

## 2018-06-25 RX ORDER — SODIUM CHLORIDE 9 MG/ML
INJECTION, SOLUTION INTRAVENOUS CONTINUOUS
Status: DISCONTINUED | OUTPATIENT
Start: 2018-06-25 | End: 2018-06-26

## 2018-06-25 RX ORDER — PIPERACILLIN SODIUM, TAZOBACTAM SODIUM 4; .5 G/20ML; G/20ML
4.5 INJECTION, POWDER, LYOPHILIZED, FOR SOLUTION INTRAVENOUS ONCE
Status: COMPLETED | OUTPATIENT
Start: 2018-06-25 | End: 2018-06-26

## 2018-06-25 RX ORDER — NALOXONE HYDROCHLORIDE 1 MG/ML
2 INJECTION INTRAMUSCULAR; INTRAVENOUS; SUBCUTANEOUS ONCE
Status: COMPLETED | OUTPATIENT
Start: 2018-06-25 | End: 2018-06-25

## 2018-06-25 RX ORDER — LIDOCAINE 40 MG/G
CREAM TOPICAL
Status: DISCONTINUED | OUTPATIENT
Start: 2018-06-25 | End: 2018-06-30 | Stop reason: HOSPADM

## 2018-06-25 RX ADMIN — ACETAMINOPHEN 650 MG: 325 SUPPOSITORY RECTAL at 22:08

## 2018-06-25 RX ADMIN — SODIUM CHLORIDE 1000 ML: 9 INJECTION, SOLUTION INTRAVENOUS at 22:13

## 2018-06-25 RX ADMIN — VANCOMYCIN HYDROCHLORIDE 2250 MG: 1 INJECTION, POWDER, LYOPHILIZED, FOR SOLUTION INTRAVENOUS at 22:13

## 2018-06-25 RX ADMIN — NALOXONE HYDROCHLORIDE 2 MG: 1 INJECTION PARENTERAL at 20:03

## 2018-06-25 RX ADMIN — SODIUM CHLORIDE 1000 ML: 9 INJECTION, SOLUTION INTRAVENOUS at 20:36

## 2018-06-25 NOTE — IP AVS SNAPSHOT
UNIT 5B [facility] BANK: [phone]                                              INTERAGENCY TRANSFER FORM - LAB / IMAGING / EKG / EMG RESULTS   2018                    Hospital Admission Date: 2018  RANCHO SPENCER   : 1945  Sex: Male        Attending Provider: Michelle Lauren MD     Allergies:  Latex, Penicillins    Infection:  MRSA   Service:  HOSPITALIST    Ht:  1.829 m (6')   Wt:  91.6 kg (201 lb 15.1 oz)   Admission Wt:  88.5 kg (195 lb)    BMI:  27.39 kg/m 2   BSA:  2.16 m 2            Patient PCP Information     Provider PCP Type    Provider Not In System General         Lab Results - 3 Days      Magnesium [[id number]] (Abnormal)  Resulted: 18, Result status: Final result    Ordering provider: Shadia Craft PA-C  18 0000 Resulting lab: [facility]    Specimen Information    Type Source Collected On   Blood  18 0717          Components       Value Reference Range Flag Lab   Magnesium 2.4 1.6 - 2.3 mg/dL H 51            Basic metabolic panel [[id number]] (Abnormal)  Resulted: 18, Result status: Final result    Ordering provider: Shadia Craft PA-C  18 0000 Resulting lab: [facility]    Specimen Information    Type Source Collected On   Blood  18 0717          Components       Value Reference Range Flag Lab   Sodium 139 133 - 144 mmol/L  51   Potassium 4.1 3.4 - 5.3 mmol/L  51   Chloride 108 94 - 109 mmol/L  51   Carbon Dioxide 20 20 - 32 mmol/L  51   Anion Gap 11 3 - 14 mmol/L  51   Glucose 162 70 - 99 mg/dL H 51   Urea Nitrogen 22 7 - 30 mg/dL  51   Creatinine 1.20 0.66 - 1.25 mg/dL  51   GFR Estimate 59 >60 mL/min/1.7m2 L 51   Comment:  Non  GFR Calc   GFR Estimate If Black 72 >60 mL/min/1.7m2  51   Comment:  African American GFR Calc   Calcium 9.0 8.5 - 10.1 mg/dL  51            Glucose by meter [[id number]] (Abnormal)  Resulted:  06/30/18 0835, Result status: Final result    Resulting lab: POINT OF CARE TEST, GLUCOSE     Specimen Information    Type Source Collected On     06/30/18 0820          Components       Value Reference Range Flag Lab   Glucose 156 70 - 99 mg/dL H 170            INR [[id number]] (Abnormal)  Resulted: 06/30/18 0818, Result status: Final result    Ordering provider: Jennifer Henderson PA  06/30/18 0000 Resulting lab: [facility]    Specimen Information    Type Source Collected On   Blood  06/30/18 0717          Components       Value Reference Range Flag Lab   INR 1.55 0.86 - 1.14 H 51            CBC with platelets [[id number]] (Abnormal)  Resulted: 06/30/18 0807, Result status: Final result    Ordering provider: Shadia Craft PA-C  06/30/18 0000 Resulting lab: [facility]    Specimen Information    Type Source Collected On   Blood  06/30/18 0717          Components       Value Reference Range Flag Lab   WBC 7.3 4.0 - 11.0 10e9/L  51   RBC Count 3.67 4.4 - 5.9 10e12/L L 51   Hemoglobin 10.5 13.3 - 17.7 g/dL L 51   Hematocrit 30.5 40.0 - 53.0 % L 51   MCV 83 78 - 100 fl  51   MCH 28.6 26.5 - 33.0 pg  51   MCHC 34.4 31.5 - 36.5 g/dL  51   RDW 13.7 10.0 - 15.0 %  51   Platelet Count 105 150 - 450 10e9/L L 51            Blood culture yeast [[id number]]  Resulted: 06/30/18 0600, Result status: Preliminary result    Ordering provider: Paige Sue PA-C  06/27/18 3052 Resulting lab: [facility]    Specimen Information    Type Source Collected On   Blood  06/28/18 0100   Comment:  Right Arm          Components       Value Reference Range Flag Lab   Specimen Description Blood Right Arm      Special Requests Received in aerobic bottle only   75   Culture Micro No growth after 2 days   226            Blood culture [[id number]]  Resulted: 06/30/18 0558, Result status: Preliminary result    Ordering provider: Guera Son APRN  CNP  06/27/18 2037 Resulting lab: [facility]    Specimen Information    Type Source Collected On   Blood  06/27/18 2116   Comment:  Right Hand          Components       Value Reference Range Flag Lab   Specimen Description Blood Right Hand      Special Requests Received in aerobic bottle only   75   Culture Micro No growth after 3 days   226            Blood culture [[id number]]  Resulted: 06/30/18 0558, Result status: Preliminary result    Ordering provider: Guera Son APRN CNP  06/27/18 2037 Resulting lab: [facility]    Specimen Information    Type Source Collected On   Blood  06/27/18 2130   Comment:  Left Hand          Components       Value Reference Range Flag Lab   Specimen Description Blood Left Hand      Special Requests Received in aerobic bottle only   75   Culture Micro No growth after 3 days   226            Blood culture [[id number]]  Resulted: 06/30/18 0557, Result status: Preliminary result    Ordering provider: Levy Underwood DO  06/25/18 2012 Resulting lab: [facility]    Specimen Information    Type Source Collected On   Blood Arm, Left 06/25/18 2215   Comment:  Left Arm          Components       Value Reference Range Flag Lab   Specimen Description Blood Left Arm      Special Requests Received in aerobic bottle only   75   Culture Micro No growth after 5 days   226            Blood culture [[id number]]  Resulted: 06/30/18 0557, Result status: Preliminary result    Ordering provider: Levy Underwood DO  06/25/18 2012 Resulting lab: [facility]    Specimen Information    Type Source Collected On   Blood Arm, Forearm Only, Left 06/25/18 2039   Comment:  Left Arm          Components       Value Reference Range Flag Lab   Specimen Description Blood Left Arm      Special Requests Received in aerobic bottle only   75   Culture Micro No growth after 5 days   226            Glucose by meter [[id number]] (Abnormal)  Resulted:  06/30/18 0221, Result status: Final result    Resulting lab: POINT OF CARE TEST, GLUCOSE     Specimen Information    Type Source Collected On     06/30/18 0208          Components       Value Reference Range Flag Lab   Glucose 199 70 - 99 mg/dL H 170            Methicillin Resistant Staph Aureus PCR [[id number]]  Resulted: 06/29/18 2206, Result status: Final result    Ordering provider: Shadia Craft PA-C  06/29/18 1422 Resulting lab: [facility]    Specimen Information    Type Source Collected On   Nares  06/29/18 1830          Components       Value Reference Range Flag Lab   Specimen Description Nares   51   Methicillin Resist/Sens S. aureus PCR Negative NEG^Negative  51   Comment:         MRSA Negative: SA Positive  MRSA target DNA not detected, presumed negative for MRSA colonization or the   number of bacteria present may be below the limit of detection.  Staphylococcus aureus target DNA detected, presumed positive for SA   colonization.  A positive test does not necessarily indicate the presence of viable   organisms.  It is, however, presumptive for the presence of SA.  This result   does not preclude MRSA nasal colonization.  FDA approved assay performed using [employer] GeneXpert(R) real-time PCR.              Glucose by meter [[id number]] (Abnormal)  Resulted: 06/29/18 2126, Result status: Final result    Resulting lab: POINT OF CARE TEST, GLUCOSE     Specimen Information    Type Source Collected On     06/29/18 2102          Components       Value Reference Range Flag Lab   Glucose 178 70 - 99 mg/dL H 170            Glucose by meter [[id number]] (Abnormal)  Resulted: 06/29/18 1744, Result status: Final result    Resulting lab: POINT OF CARE TEST, GLUCOSE     Specimen Information    Type Source Collected On     06/29/18 1730          Components       Value Reference Range Flag Lab   Glucose 175 70 - 99 mg/dL H 170            Glucose by meter [[id number]] (Abnormal)   Resulted: 06/29/18 1219, Result status: Final result    Resulting lab: POINT OF CARE TEST, GLUCOSE     Specimen Information    Type Source Collected On     06/29/18 1144          Components       Value Reference Range Flag Lab   Glucose 275 70 - 99 mg/dL H 170            Glucose by meter [[id number]] (Abnormal)  Resulted: 06/29/18 0857, Result status: Final result    Resulting lab: POINT OF CARE TEST, GLUCOSE     Specimen Information    Type Source Collected On     06/29/18 0802          Components       Value Reference Range Flag Lab   Glucose 144 70 - 99 mg/dL H 170            Magnesium [[id number]]  Resulted: 06/29/18 0814, Result status: Final result    Ordering provider: Shadia Craft PA-C  06/29/18 0624 Resulting lab: [facility]    Specimen Information    Type Source Collected On     06/29/18 0624          Components       Value Reference Range Flag Lab   Magnesium 1.8 1.6 - 2.3 mg/dL  51            Basic metabolic panel [[id number]] (Abnormal)  Resulted: 06/29/18 0722, Result status: Final result    Ordering provider: Shadia Craft PA-C  06/29/18 0000 Resulting lab: [facility]    Specimen Information    Type Source Collected On   Blood  06/29/18 0624          Components       Value Reference Range Flag Lab   Sodium 139 133 - 144 mmol/L  51   Potassium 3.7 3.4 - 5.3 mmol/L  51   Chloride 107 94 - 109 mmol/L  51   Carbon Dioxide 22 20 - 32 mmol/L  51   Anion Gap 11 3 - 14 mmol/L  51   Glucose 158 70 - 99 mg/dL H 51   Urea Nitrogen 25 7 - 30 mg/dL  51   Creatinine 1.34 0.66 - 1.25 mg/dL H 51   GFR Estimate 52 >60 mL/min/1.7m2 L 51   Comment:  Non  GFR Calc   GFR Estimate If Black 63 >60 mL/min/1.7m2  51   Comment:  African American GFR Calc   Calcium 8.7 8.5 - 10.1 mg/dL  51            CRP inflammation [[id number]] (Abnormal)  Resulted: 06/29/18 0722, Result status: Final result    Ordering provider:  Shadia Craft PA-C  06/29/18 0000 Resulting lab: [facility]    Specimen Information    Type Source Collected On   Blood  06/29/18 0624          Components       Value Reference Range Flag Lab   CRP Inflammation 120.0 0.0 - 8.0 mg/L H 51            Hepatic panel [[id number]] (Abnormal)  Resulted: 06/29/18 0722, Result status: Final result    Ordering provider: Paige Sue PA-C  06/29/18 0000 Resulting lab: [facility]    Specimen Information    Type Source Collected On   Blood  06/29/18 0624          Components       Value Reference Range Flag Lab   Bilirubin Direct 0.3 0.0 - 0.2 mg/dL H 51   Bilirubin Total 1.0 0.2 - 1.3 mg/dL  51   Albumin 2.8 3.4 - 5.0 g/dL L 51   Protein Total 7.5 6.8 - 8.8 g/dL  51   Alkaline Phosphatase 73 40 - 150 U/L  51   ALT 25 0 - 70 U/L  51   AST 24 0 - 45 U/L  51            INR [[id number]] (Abnormal)  Resulted: 06/29/18 0711, Result status: Final result    Ordering provider: Jennifer Henderson PA  06/29/18 0000 Resulting lab: [facility]    Specimen Information    Type Source Collected On   Blood  06/29/18 0624          Components       Value Reference Range Flag Lab   INR 1.55 0.86 - 1.14 H 51            D dimer quantitative [[id number]] (Abnormal)  Resulted: 06/29/18 0711, Result status: Final result    Ordering provider: Anderson Lazar PA-C  06/29/18 0000 Resulting lab: [facility]    Specimen Information    Type Source Collected On   Blood  06/29/18 0624          Components       Value Reference Range Flag Lab   D Dimer 0.6 0.0 - 0.50 ug/ml FEU H 51   Comment:         This D-dimer assay is intended for use in conjunction with a clinical pretest   probability assessment model to exclude pulmonary embolism (PE) and deep   venous thrombosis (DVT) in outpatients suspected of PE or DVT. The cut-off   value is 0.5 ug/mL FEU.               CBC with platelets [[id number]] (Abnormal)  Resulted: 06/29/18 0659, Result status: Final result    Ordering provider: Shadia Craft PA-C  06/29/18 0000 Resulting lab: [facility]    Specimen Information    Type Source Collected On   Blood  06/29/18 0624          Components       Value Reference Range Flag Lab   WBC 7.4 4.0 - 11.0 10e9/L  51   RBC Count 3.63 4.4 - 5.9 10e12/L L 51   Hemoglobin 10.5 13.3 - 17.7 g/dL L 51   Hematocrit 30.3 40.0 - 53.0 % L 51   MCV 84 78 - 100 fl  51   MCH 28.9 26.5 - 33.0 pg  51   MCHC 34.7 31.5 - 36.5 g/dL  51   RDW 13.9 10.0 - 15.0 %  51   Platelet Count 95 150 - 450 10e9/L L 51            Glucose by meter [[id number]] (Abnormal)  Resulted: 06/28/18 2240, Result status: Final result    Resulting lab: POINT OF CARE TEST, GLUCOSE     Specimen Information    Type Source Collected On     06/28/18 2228          Components       Value Reference Range Flag Lab   Glucose 188 70 - 99 mg/dL H 170            Glucose by meter [[id number]] (Abnormal)  Resulted: 06/28/18 1747, Result status: Final result    Resulting lab: POINT OF CARE TEST, GLUCOSE     Specimen Information    Type Source Collected On     06/28/18 1732          Components       Value Reference Range Flag Lab   Glucose 147 70 - 99 mg/dL H 170            Hepatic panel [[id number]] (Abnormal)  Resulted: 06/28/18 1523, Result status: Final result    Ordering provider: Guera Son APRN CNP  06/27/18 2045 Resulting lab: [facility]    Specimen Information    Type Source Collected On     06/27/18 2045          Components       Value Reference Range Flag Lab   Bilirubin Direct 0.4 0.0 - 0.2 mg/dL H 51   Bilirubin Total 1.4 0.2 - 1.3 mg/dL H 51   Albumin 2.7 3.4 - 5.0 g/dL L 51   Protein Total 7.3 6.8 - 8.8 g/dL  51   Alkaline Phosphatase 74 40 - 150 U/L  51   ALT 16 0 - 70 U/L  51   AST 26 0 - 45 U/L  51            Treponema Abs w Reflex to RPR and  Titer [[id number]]  Resulted: 06/28/18 1125, Result status: Final result    Ordering provider: Paige Sue PA-C  06/28/18 0025 Resulting lab: [facility]    Specimen Information    Type Source Collected On   Blood  06/28/18 0727          Components       Value Reference Range Flag Lab   Treponema Antibodies Nonreactive NR^Nonreactive  51            Glucose by meter [[id number]] (Abnormal)  Resulted: 06/28/18 0845, Result status: Final result    Ordering provider: Sonu Zarco MD  06/28/18 0812 Resulting lab: POINT OF CARE TEST, GLUCOSE    Specimen Information    Type Source Collected On     06/28/18 0812          Components       Value Reference Range Flag Lab   Glucose 159 70 - 99 mg/dL H 170            Procalcitonin [[id number]]  Resulted: 06/28/18 0831, Result status: Final result    Ordering provider: Paige Sue PA-C  06/28/18 0150 Resulting lab: [facility]    Specimen Information    Type Source Collected On   Blood  06/28/18 0624          Components       Value Reference Range Flag Lab   Procalcitonin 0.37 ng/ml  51   Comment:         0.25-0.49 ng/ml  Possible early systemic infection or localized infection.     Recommendation: Encourage antibiotics only in the correct clinical context.   Consider obtaining blood cultures or other relevant cultures. Recheck PCT in   6-12 hours to ensure baseline low level. If repeat PCT is rising, consider   early systemic infection and consider starting antibiotics.              Glucose by meter [[id number]] (Abnormal)  Resulted: 06/28/18 0801, Result status: Final result    Ordering provider: Sonu Zarco MD  06/28/18 0742 Resulting lab: POINT OF CARE TEST, GLUCOSE    Specimen Information    Type Source Collected On     06/28/18 0742          Components       Value Reference Range Flag Lab   Glucose 164 70 - 99 mg/dL H 170            Vitamin B1 whole blood [[id number]]   Resulted: 06/28/18 0736, Result status: In process    Ordering provider: Paige Sue PA-C  06/28/18 0120 Resulting lab: [facility]    Specimen Information    Type Source Collected On   Blood  06/28/18 0727            Bilirubin direct [[id number]] (Abnormal)  Resulted: 06/28/18 0659, Result status: Final result    Ordering provider: Jennifer Hnederson PA  06/28/18 0601 Resulting lab: [facility]    Specimen Information    Type Source Collected On     06/28/18 0624          Components       Value Reference Range Flag Lab   Bilirubin Direct 0.3 0.0 - 0.2 mg/dL H 51            Lactate Dehydrogenase [[id number]]  Resulted: 06/28/18 0659, Result status: Final result    Ordering provider: Jennifer Henderson PA  06/28/18 0601 Resulting lab: [facility]    Specimen Information    Type Source Collected On     06/28/18 0624          Components       Value Reference Range Flag Lab   Lactate Dehydrogenase 142 85 - 227 U/L  51            CRP inflammation [[id number]] (Abnormal)  Resulted: 06/28/18 0659, Result status: Final result    Ordering provider: Paige Sue PA-C  06/28/18 0150 Resulting lab: [facility]    Specimen Information    Type Source Collected On   Blood  06/28/18 0624          Components       Value Reference Range Flag Lab   CRP Inflammation 160.0 0.0 - 8.0 mg/L H 51            NT proBNP inpatient and ED [[id number]] (Abnormal)  Resulted: 06/28/18 0659, Result status: Final result    Ordering provider: Paige Sue PA-C  06/28/18 0150 Resulting lab: [facility]    Specimen Information    Type Source Collected On   Blood  06/28/18 0624          Components       Value Reference Range Flag Lab   N-Terminal Pro BNP Inpatient 4872 0 - 900 pg/mL H 51   Comment:            Reference range shown and results flagged as abnormal are suggested inpatient   cut points for  confirming diagnosis if CHF in an acute setting. Establishing a   baseline value for each individual patient is useful for follow-up. An   inpatient or emergency department NT-proPBNP <300 pg/mL effectively rules out   acute CHF, with 99% negative predictive value.  The outpatient non-acute reference range for ruling out CHF is:   0-125 pg/mL (age 18 to less than 75)   0-450 pg/mL (age 75 yrs and older)              Basic metabolic panel [[id number]] (Abnormal)  Resulted: 06/28/18 0659, Result status: Final result    Ordering provider: Juju Godinez CNP  06/28/18 0000 Resulting lab: [facility]    Specimen Information    Type Source Collected On   Blood  06/28/18 0624          Components       Value Reference Range Flag Lab   Sodium 136 133 - 144 mmol/L  51   Potassium 3.9 3.4 - 5.3 mmol/L  51   Chloride 104 94 - 109 mmol/L  51   Carbon Dioxide 21 20 - 32 mmol/L  51   Anion Gap 11 3 - 14 mmol/L  51   Glucose 170 70 - 99 mg/dL H 51   Urea Nitrogen 22 7 - 30 mg/dL  51   Creatinine 1.13 0.66 - 1.25 mg/dL  51   GFR Estimate 64 >60 mL/min/1.7m2  51   Comment:  Non  GFR Calc   GFR Estimate If Black 77 >60 mL/min/1.7m2  51   Comment:  African American GFR Calc   Calcium 8.6 8.5 - 10.1 mg/dL  51            CK total [[id number]]  Resulted: 06/28/18 0659, Result status: Final result    Ordering provider: Jennifer Henderson PA  06/28/18 0601 Resulting lab: [facility]    Specimen Information    Type Source Collected On     06/28/18 0624          Components       Value Reference Range Flag Lab   CK Total 231 30 - 300 U/L  51            INR [[id number]] (Abnormal)  Resulted: 06/28/18 0651, Result status: Final result    Ordering provider: Jennifer Henderson PA  06/28/18 0000 Resulting lab: [facility]    Specimen Information    Type Source Collected On   Blood  06/28/18 0624          Components       Value  Reference Range Flag Lab   INR 1.72 0.86 - 1.14 H 51            CBC with platelets [[id number]] (Abnormal)  Resulted: 06/28/18 0640, Result status: Final result    Ordering provider: Juju Godinez CNP  06/28/18 0000 Resulting lab: [facility]    Specimen Information    Type Source Collected On   Blood  06/28/18 0624          Components       Value Reference Range Flag Lab   WBC 9.2 4.0 - 11.0 10e9/L  51   RBC Count 3.65 4.4 - 5.9 10e12/L L 51   Hemoglobin 10.5 13.3 - 17.7 g/dL L 51   Hematocrit 30.9 40.0 - 53.0 % L 51   MCV 85 78 - 100 fl  51   MCH 28.8 26.5 - 33.0 pg  51   MCHC 34.0 31.5 - 36.5 g/dL  51   RDW 14.0 10.0 - 15.0 %  51   Platelet Count 89 150 - 450 10e9/L L 51            Blood gas venous [[id number]] (Abnormal)  Resulted: 06/28/18 0141, Result status: Final result    Ordering provider: Paige Sue PA-C  06/28/18 0000 Resulting lab: [facility]    Specimen Information    Type Source Collected On   Blood  06/28/18 0100          Components       Value Reference Range Flag Lab   Ph Venous 7.37 7.32 - 7.43 pH  51   PCO2 Venous 37 40 - 50 mm Hg L 51   PO2 Venous 39 25 - 47 mm Hg  51   Bicarbonate Venous 21 21 - 28 mmol/L  51   Base Deficit Venous 3.5 mmol/L  51   Comment:  Reference range:  -7.7 to 1.9   FIO2 21   51            Erythrocyte sedimentation rate auto [[id number]] (Abnormal)  Resulted: 06/27/18 2321, Result status: Final result    Ordering provider: Guera Son APRN CNP  06/27/18 2045 Resulting lab: [facility]    Specimen Information    Type Source Collected On     06/27/18 2045          Components       Value Reference Range Flag Lab   Sed Rate 50 0 - 20 mm/h H 51            Procalcitonin [[id number]]  Resulted: 06/27/18 2304, Result status: Final result    Ordering provider: Guera Son APRN CNP  06/27/18 2047 Resulting lab: [facility]     Specimen Information    Type Source Collected On   Blood  06/27/18 2045          Components       Value Reference Range Flag Lab   Procalcitonin 0.33 ng/ml  51   Comment:         0.25-0.49 ng/ml  Possible early systemic infection or localized infection.     Recommendation: Encourage antibiotics only in the correct clinical context.   Consider obtaining blood cultures or other relevant cultures. Recheck PCT in   6-12 hours to ensure baseline low level. If repeat PCT is rising, consider   early systemic infection and consider starting antibiotics.              Glucose by meter [[id number]] (Abnormal)  Resulted: 06/27/18 2223, Result status: Final result    Ordering provider: Keith Singh MD  06/27/18 2207 Resulting lab: POINT OF CARE TEST, GLUCOSE    Specimen Information    Type Source Collected On     06/27/18 2207          Components       Value Reference Range Flag Lab   Glucose 161 70 - 99 mg/dL H 170            CBC with platelets differential [[id number]] (Abnormal)  Resulted: 06/27/18 2203, Result status: Final result    Ordering provider: Guera Son APRN CNP  06/27/18 2037 Resulting lab: [facility]    Specimen Information    Type Source Collected On   Blood  06/27/18 2045          Components       Value Reference Range Flag Lab   WBC 11.3 4.0 - 11.0 10e9/L H 51   RBC Count 3.66 4.4 - 5.9 10e12/L L 51   Hemoglobin 10.6 13.3 - 17.7 g/dL L 51   Hematocrit 31.7 40.0 - 53.0 % L 51   MCV 87 78 - 100 fl  51   MCH 29.0 26.5 - 33.0 pg  51   MCHC 33.4 31.5 - 36.5 g/dL  51   RDW 14.1 10.0 - 15.0 %  51   Platelet Count 93 150 - 450 10e9/L L 51   Diff Method Automated Method   51   % Neutrophils 85.4 %  51   % Lymphocytes 7.5 %  51   % Monocytes 5.9 %  51   % Eosinophils 0.9 %  51   % Basophils 0.1 %  51   % Immature Granulocytes 0.2 %  51   Nucleated RBCs 0 0 /100  51   Absolute Neutrophil 9.6 1.6 - 8.3 10e9/L H 51   Absolute Lymphocytes 0.8 0.8 - 5.3 10e9/L  51   Absolute  Monocytes 0.7 0.0 - 1.3 10e9/L  51   Absolute Eosinophils 0.1 0.0 - 0.7 10e9/L  51   Absolute Basophils 0.0 0.0 - 0.2 10e9/L  51   Abs Immature Granulocytes 0.0 0 - 0.4 10e9/L  51   Absolute Nucleated RBC 0.0   51            Basic metabolic panel [[id number]] (Abnormal)  Resulted: 06/27/18 2152, Result status: Final result    Ordering provider: Guera Son APRN CNP  06/27/18 2037 Resulting lab: [facility]    Specimen Information    Type Source Collected On   Blood  06/27/18 2045          Components       Value Reference Range Flag Lab   Sodium 136 133 - 144 mmol/L  51   Potassium 5.0 3.4 - 5.3 mmol/L  51   Chloride 105 94 - 109 mmol/L  51   Carbon Dioxide 22 20 - 32 mmol/L  51   Anion Gap 9 3 - 14 mmol/L  51   Glucose 168 70 - 99 mg/dL H 51   Urea Nitrogen 21 7 - 30 mg/dL  51   Creatinine 1.21 0.66 - 1.25 mg/dL  51   GFR Estimate 59 >60 mL/min/1.7m2 L 51   Comment:  Non  GFR Calc   GFR Estimate If Black 71 >60 mL/min/1.7m2  51   Comment:  African American GFR Calc   Calcium 8.7 8.5 - 10.1 mg/dL  51            NT proBNP inpatient and ED [[id number]] (Abnormal)  Resulted: 06/27/18 2152, Result status: Final result    Ordering provider: Guera Son APRN CNP  06/27/18 2042 Resulting lab: [facility]    Specimen Information    Type Source Collected On   Blood  06/27/18 2045          Components       Value Reference Range Flag Lab   N-Terminal Pro BNP Inpatient 4514 0 - 900 pg/mL H 51   Comment:            Reference range shown and results flagged as abnormal are suggested inpatient   cut points for confirming diagnosis if CHF in an acute setting. Establishing a   baseline value for each individual patient is useful for follow-up. An   inpatient or emergency department NT-proPBNP <300 pg/mL effectively rules out   acute CHF, with 99% negative predictive value.  The outpatient non-acute reference range for ruling out CHF is:    0-125 pg/mL (age 18 to less than 75)   0-450 pg/mL (age 75 yrs and older)              Lactic acid level STAT for sepsis protocol [[id number]]  Resulted: 06/27/18 2109, Result status: Final result    Ordering provider: David Holder MD  06/27/18 2008 Resulting lab: [facility]    Specimen Information    Type Source Collected On   Blood  06/27/18 2054          Components       Value Reference Range Flag Lab   Lactate for Sepsis Protocol 1.4 0.4 - 1.9 mmol/L  51            Glucose by meter [[id number]] (Abnormal)  Resulted: 06/27/18 1754, Result status: Final result    Ordering provider: Keith Singh MD  06/27/18 1739 Resulting lab: POINT OF CARE TEST, GLUCOSE    Specimen Information    Type Source Collected On     06/27/18 1739          Components       Value Reference Range Flag Lab   Glucose 122 70 - 99 mg/dL H 170            Glucose by meter [[id number]] (Abnormal)  Resulted: 06/27/18 1201, Result status: Final result    Ordering provider: Keith Singh MD  06/27/18 1147 Resulting lab: POINT OF CARE TEST, GLUCOSE    Specimen Information    Type Source Collected On     06/27/18 1147          Components       Value Reference Range Flag Lab   Glucose 158 70 - 99 mg/dL H 170            Glucose by meter [[id number]] (Abnormal)  Resulted: 06/27/18 0856, Result status: Final result    Ordering provider: Keith Singh MD  06/27/18 0806 Resulting lab: POINT OF CARE TEST, GLUCOSE    Specimen Information    Type Source Collected On     06/27/18 0806          Components       Value Reference Range Flag Lab   Glucose 152 70 - 99 mg/dL H 170            CRP inflammation [[id number]] (Abnormal)  Resulted: 06/27/18 0706, Result status: Final result    Ordering provider: Char Ross APRN CNP  06/27/18 0000 Resulting lab: [facility]    Specimen Information    Type Source Collected On   Blood  06/27/18 0618          Components       Value Reference  Range Flag Lab   CRP Inflammation 150.0 0.0 - 8.0 mg/L H 51            Basic metabolic panel [[id number]] (Abnormal)  Resulted: 06/27/18 0706, Result status: Final result    Ordering provider: Char Ross APRN CNP  06/27/18 0000 Resulting lab: [facility]    Specimen Information    Type Source Collected On   Blood  06/27/18 0618          Components       Value Reference Range Flag Lab   Sodium 135 133 - 144 mmol/L  51   Potassium 4.3 3.4 - 5.3 mmol/L  51   Chloride 106 94 - 109 mmol/L  51   Carbon Dioxide 19 20 - 32 mmol/L L 51   Anion Gap 9 3 - 14 mmol/L  51   Glucose 152 70 - 99 mg/dL H 51   Urea Nitrogen 24 7 - 30 mg/dL  51   Creatinine 1.17 0.66 - 1.25 mg/dL  51   GFR Estimate 61 >60 mL/min/1.7m2  51   Comment:  Non  GFR Calc   GFR Estimate If Black 74 >60 mL/min/1.7m2  51   Comment:  African American GFR Calc   Calcium 8.5 8.5 - 10.1 mg/dL  51            INR [[id number]] (Abnormal)  Resulted: 06/27/18 0651, Result status: Final result    Ordering provider: Jennifer Henderson PA  06/27/18 0000 Resulting lab: [facility]    Specimen Information    Type Source Collected On   Blood  06/27/18 0618          Components       Value Reference Range Flag Lab   INR 2.06 0.86 - 1.14 H 51            CBC with platelets differential [[id number]] (Abnormal)  Resulted: 06/27/18 0644, Result status: Final result    Ordering provider: Char Ross APRN CNP  06/27/18 0000 Resulting lab: [facility]    Specimen Information    Type Source Collected On   Blood  06/27/18 0618          Components       Value Reference Range Flag Lab   WBC 12.2 4.0 - 11.0 10e9/L H 51   RBC Count 3.62 4.4 - 5.9 10e12/L L 51   Hemoglobin 10.2 13.3 - 17.7 g/dL L 51   Hematocrit 31.5 40.0 - 53.0 % L 51   MCV 87 78 - 100 fl  51   MCH 28.2 26.5 - 33.0 pg  51   MCHC 32.4 31.5 - 36.5 g/dL  51   RDW 14.1 10.0 - 15.0 %  51   Platelet Count  "89 150 - 450 10e9/L L 51   Diff Method Automated Method   51   % Neutrophils 86.3 %  51   % Lymphocytes 7.6 %  51   % Monocytes 5.1 %  51   % Eosinophils 0.6 %  51   % Basophils 0.2 %  51   % Immature Granulocytes 0.2 %  51   Nucleated RBCs 0 0 /100  51   Absolute Neutrophil 10.5 1.6 - 8.3 10e9/L H 51   Absolute Lymphocytes 0.9 0.8 - 5.3 10e9/L  51   Absolute Monocytes 0.6 0.0 - 1.3 10e9/L  51   Absolute Eosinophils 0.1 0.0 - 0.7 10e9/L  51   Absolute Basophils 0.0 0.0 - 0.2 10e9/L  51   Abs Immature Granulocytes 0.0 0 - 0.4 10e9/L  51   Absolute Nucleated RBC 0.0   51            Testing Performed By     Lab - Abbreviation Name Director Address Valid Date Range    45 - PND949 MISYS Unknown Unknown 01/28/02 0000 - Present    51 - Unknown [facility] Unknown [address] [facility] [place] [address] 12/31/14 1010 - Present    75 - Unknown [facility] Unknown [address] [facility] [address] 01/15/15 1019 - Present    170 - Unknown POINT OF CARE TEST, GLUCOSE Unknown Unknown 10/31/11 1114 - Present    226 - Unknown [facility]ID [facility] LAB Unknown [address] [facility] [address] 12/19/14 0955 - Present            Unresulted Labs (24h ago through future)    Start       Ordered    06/29/18 0600  Creatinine  AM DRAW,   Routine     Comments:  Last Lab Result: Creatinine (mg/dL)       Date                     Value                 06/28/2018               1.13             ----------    06/28/18 1147    06/27/18 0600  INR  (warfarin (COUMADIN) Pharmacy Consult-INITIAL ORDER)  DAILY,   Routine      06/26/18 0528    Unscheduled  Potassium  (Potassium Replacement - \"High\" - Replacement for all levels less than 4.1 mmol/L - UU,UR,UA,RH,SH,PH,WY )  CONDITIONAL (SPECIFY),   Routine     Comments:  Obtain Potassium Level for these conditions:  *IF no potassium result within 24 hrs before initiation of order set, draw potassium level with next lab collect.    *2 " "HOURS AFTER last IV potassium replacement dose and 4 hours after an oral replacement dose when potassium replacement given for level less than 3.4.  *Next morning after potassium dose.     Repeat Potassium Replacement if necessary.    06/29/18 0946    Unscheduled  Magnesium  (Magnesium Replacement - Adult - \"High\" - Replacement for all levels less than or equal to 2 mg/dL)  CONDITIONAL (SPECIFY),   Routine     Comments:  Obtain Magnesium Level for these conditions:  *IF no magnesium result within 24 hrs before initiation of order set, draw magnesium level with next lab collect.    *2 HOURS AFTER last magnesium replacement dose when magnesium replacement given for level less than 1.6  *Next morning after magnesium dose.     Repeat Magnesium Replacement if necessary.    06/29/18 0946         Imaging Results - 3 Days      US Abdomen Limited Portable [[id number]]  Resulted: 06/28/18 1045, Result status: Final result    Ordering provider: Shadia Craft PA-C  06/28/18 0811 Resulted by: Rosina Reyes MD    Performed: 06/28/18 0940 - 06/28/18 1040 Resulting lab: RADIOLOGY RESULTS    Narrative:       EXAMINATION: US ABDOMEN RIGHT UPPER QUADRANT,  6/28/2018 10:40 AM     COMPARISON: None.    HISTORY: Intermittent fevers, mildly elevated bilirubin.    TECHNIQUE: The abdomen was scanned in standard fashion with  specialized ultrasound transducer(s) using both gray-scale and limited  color Doppler techniques.    Findings:  Liver: The liver is enlarged measuring 17.9 cm. demonstrates normal  homogeneous echotexture. No evidence of a focal hepatic mass.    Gallbladder: Cholelithiasis. No gallbladder wall thickening. No  pericholecystic fluid. Negative sonographic Perry's sign. Biliary  sludge.    Bile Ducts: Both the intra- and extrahepatic biliary system are of  normal caliber.  The common bile duct measures 6 mm in diameter.    Pancreas: Visualized portions of the head and body of the pancreas " are  unremarkable.     Right kidney with normal echotexture, without solid mass or  hydronephrosis.   Benign-appearing cyst measuring 1.5 cm. The right  kidney measures 10.3 cm in length.    Fluid: No pleural effusion. No free fluid in the right upper quadrant  of the abdomen.      Impression:       Impression:   1.  Hepatomegaly. No focal liver lesions.  2.  Cholelithiasis. No associated sonographic findings of acute  cholecystitis.    SEDRICK BIRMINGHAM MD      XR Abdomen Port 1 View [[id number]]  Resulted: 06/28/18 0618, Result status: Final result    Ordering provider: Paige Sue PA-C  06/28/18 0017 Resulted by: Trent Diaz MD Boegel, Kevin H, MD    Performed: 06/28/18 0028 - 06/28/18 0156 Resulting lab: RADIOLOGY RESULTS    Narrative:       Exam: XR ABDOMEN PORT 1 VW, 6/28/2018 1:56 AM    Indication: AMS, fever, distention. 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 Mild  cardiomegaly. Calcified right hilar and pulmonary granulomas. No  dilated loop of bowel. There is a 2.5 cm calcification projecting over  the gallbladder/right kidney.      Impression:       Impression:   1. 2.5 cm calcification in the right abdomen probably represents a  gallstone. Differential includes a renal calculus. Consider further  evaluation with ultrasound.  2. Normal bowel gas pattern.    [Consider Follow Up: Right upper quadrant abdominal ultrasound]    This report will be copied to the [facility] to ensure a  provider acknowledges the finding.     I have personally reviewed the examination and initial interpretation  and I agree with the findings.    TRENT DIAZ MD    Components       Value Reference Range Flag Lab   Radiologist flags Right upper quadrant abdominal ultrasound               US Lower Extremity Venous Duplex Right [[id number]]  Resulted: 06/28/18 0546, Result status: Final result    Ordering provider:  Guera Son APRN CNP  06/27/18 2117 Resulted by: Trent Diaz MD Boegel, Kevin H, MD    Performed: 06/27/18 2158 - 06/27/18 2213 Resulting lab: RADIOLOGY RESULTS    Narrative:       EXAMINATION: DOPPLER VENOUS ULTRASOUND OF THE RIGHT LOWER EXTREMITY,  6/27/2018 10:13 PM     COMPARISON: None.    HISTORY: Right lower extremity edema, evaluate for DVT    TECHNIQUE:  Gray-scale evaluation with compression, spectral flow, and  color Doppler assessment of the deep venous system of the right leg  from groin to knee, and then at the ankle.    FINDINGS:  In the right lower extremity, the common femoral, femoral, popliteal  and posterior tibial veins demonstrate normal compressibility and  blood flow.    Subcutaneous edema at the right ankle.    Comparison evaluation of the left common femoral vein and great  saphenous vein demonstrates full compressibility and normal venous  waveforms.      Impression:       IMPRESSION: No evidence of right lower extremity deep venous  thrombosis.    I have personally reviewed the examination and initial interpretation  and I agree with the findings.    TRENT DIAZ MD      XR Chest 2 Views [[id number]]  Resulted: 06/28/18 0201, Result status: Final result    Ordering provider: Guera Son APRN CNP  06/27/18 2036 Resulted by: Canelo Galan MD Boegel, Kevin H, MD    Performed: 06/27/18 2140 - 06/27/18 2146 Resulting lab: RADIOLOGY RESULTS    Narrative:       Exam: XR CHEST 2 VW, 6/27/2018 9:46 PM    Indication: CAP     Comparison: Chest x-ray 6/25/2018    Findings:   Postsurgical changes of coronary artery bypass surgery. Heart is  enlarged. Unchanged bilateral interstitial opacities. No focal  consolidation, pleural effusion, or pneumothorax.      Impression:       Impression: Stable bilateral interstitial opacities. Primary  differential includes pulmonary edema versus infection.    I have personally reviewed the examination and initial  interpretation  and I agree with the findings.    CHIN SMITH MD      Testing Performed By     Lab - Abbreviation Name Director Address Valid Date Range    104 - Rad Rslts RADIOLOGY RESULTS Unknown Unknown 05 1553 - Present               ECG/EMG Results      ECHO COMPLETE WITH OPTISON [[id number]]  Resulted: 18 1102, Result status: Edited Result - FINAL    Ordering provider: Florence Henderson PA  18 0648 Resulted by: Rigo Copeland MD    Performed: 18 1116 - 18 1145 Resulting lab: RADIOLOGY RESULTS    Narrative:       [id number]  [id number]  [id number]  [id number]^JB^FLORENCE^CHOLO           [facility],[place]  Echocardiography Laboratory  [address] [place] [id number]     Name: RANCHO SPENCER  MRN: [id number]  : 1945  Study Date: 2018 11:02 AM  Age: 73 yrs  Gender: Male  Patient Location: [facility]  Reason For Study: CHF  Ordering Physician: FLORENCE HENDERSON  Performed By: Hanna Ruiz RDCS     BSA: 2.1 m2  Height: 72 in  Weight: 195 lb  HR: 76  BP: 12/68 mmHg  _____________________________________________________________________________  __        Procedure  Echocardiogram with two-dimensional, color and spectral Doppler performed.  Contrast Optison. Optison (NDC #7317-1063-48) given intravenously. Patient was  given 5 ml mixture of 3 ml Optison and 6 ml saline. 4 ml wasted.  _____________________________________________________________________________  __        Interpretation Summary  Left ventricular function is normal.The EF is 55-60% (biplane 59%).  Global right ventricular function is normal to mildly reduced. The right  ventricle is normal size.  Mild pulmonary hypertension is present.  No pericardial effusion is present.  Dilation of the inferior vena cava is present with normal respiratory  variation in diameter.  Previous study not available for comparison.         _____________________________________________________________________________  __        Left Ventricle  Left ventricular function is normal.The EF is 55-60%. Left ventricular wall  thickness is normal. Left ventricular size is normal. Diastolic function not  assessed due to arrhythmia. Abnormal non-specific septal motion is present.     Right Ventricle  The right ventricle is normal size. Global right ventricular function is  mildly reduced.     Atria  Severe left atrial enlargement is present. Mild to moderate right atrial  enlargement is present. The atrial septum is intact as assessed by color  Doppler .     Mitral Valve  Mild mitral annular calcification is present. Trace mitral insufficiency is  present.        Aortic Valve  Mild to moderate aortic valve sclerosis is present. Cusps not well seen. Trace  to mild aortic insufficiency is present.     Tricuspid Valve  Trace to mild tricuspid insufficiency is present. Right ventricular systolic  pressure is 35mmHg above the right atrial pressure. Mild pulmonary  hypertension is present.     Pulmonic Valve  Trace pulmonic insufficiency is present.     Vessels  The pulmonary artery cannot be assessed. The thoracic aorta is normal.  Ascending aorta 3.6 cm. Dilation of the inferior vena cava is present with  normal respiratory variation in diameter.     Pericardium  No pericardial effusion is present.        Compared to Previous Study  Previous study not available for comparison.  _____________________________________________________________________________  __  MMode/2D Measurements & Calculations     IVSd: 1.0 cm  LVIDd: 5.0 cm  LVIDs: 4.1 cm  LVPWd: 1.0 cm  FS: 18.6 %  LV mass(C)d: 188.4 grams  LV mass(C)dI: 89.4 grams/m2  asc Aorta Diam: 3.6 cm  LVOT diam: 2.4 cm  LVOT area: 4.5 cm2  LA Volume (BP): 107.0 ml  LA Volume Index (BP): 50.7 ml/m2  RWT: 0.40           Doppler Measurements & Calculations  MV E max leanne: 107.8 cm/sec  MV dec time: 0.15 sec  PA V2 max:  103.7 cm/sec  PA max P.3 mmHg  PA acc time: 0.11 sec  TR max leanne: 299.1 cm/sec  TR max P.8 mmHg  E/E' avg: 10.3  Lateral E/e': 8.5  Medial E/e': 12.1     _____________________________________________________________________________  __           Report approved by: Chente Hernandez 2018 05:23 PM       1    Type Source Collected On     18 1102          View Image (below)        Echocardiogram Complete [[id number]]  Resulted: 18 1118, Result status: In process    Ordering provider: Jennifer Henderson PA  18 0648 Performed: 18 1116 - 18 1116    Resulting lab: [facility]                   Encounter-Level Documents:     There are no encounter-level documents.      Order-Level Documents:     There are no order-level documents.

## 2018-06-25 NOTE — IP AVS SNAPSHOT
` `     [address] [facility]: [phone]                 INTERAGENCY TRANSFER FORM - NOTES (H&P, Discharge Summary, Consults, Procedures, Therapies)   2018                    Hospital Admission Date: 2018  RANCHO SPENCER   : 1945  Sex: Male        Patient PCP Information     Provider PCP Type    Provider Not In System General         History & Physicals      H&P by Jennifer Henderson PA at 2018  4:00 AM     Author:  Jennifer Henderson PA Service:  General Medicine Author Type:  Physician Assistant - C    Filed:  2018  7:27 AM Date of Service:  2018  4:00 AM Creation Time:  2018  5:28 AM    Status:  Cosign Needed :  Jennifer Henderson PA (Physician Assistant - C)    Cosign Required:  Yes             [facility] ED Observation Admission Note    Chief Complaint   Patient presents with     Altered Mental Status       Assessment/Plan:  1. Altered Mental Status:[SB1.1] Patient was brought in by EMS to ED while found driving[SB1.2] car erratically, swerving back and forth, when he was retrieved.[SB1.1] B[SB1.2]lood alcohol content of 0, glucose of 130.[SB1.1] En route was reported to be confused, incontin[SB1.2]e[SB1.1]nt and lethargic[SB1.2] states he has no pain. He admits to chills for the last 2 days[SB1.1],[SB1.3] cough[SB1.1], g[SB1.3]eneralized weakness.[SB1.1] BLE erythem[SB1.2]a not new[SB1.3].[SB1.2] Denies any fever, nausea, abdominal pain, diarrhea, constipation, chest pain, SOB, palpitations, headache, changes in vision. In ED, HR 90's-110's, BP /50-84, RR 11-27, SaO2 % on RA-4LNC (placed on O2 while asleep), Temp 102.2. Labs show CMP with K 5.7, Bicarb 19, BUN/Creat 48/1.33, glucose 179. CBC with WBC of 20.9, H/H 13.1/39.4, platelet 126, abs neutrophil 19.1. INR 2.42. VBG with pH 7.39, CO2 33, O2 30. Lactic acid 1.4.UA with small blood otherwise normal. Urine culture and blood culture sent and pending. UDS negative. Head CT negative for any acute  process. CXR shows small bilateral pleural effusions and adjacent basilar atelectasis and a minor consolidation; mild perihilar and mixed interstitial and airspace opacities may represent pulmonary edema; cardiomegaly. In the ED the patient was given 3L NS bolus, tylenol 650mg supp x 1, narcan 2mg IV x 1, zosyn 4.5gm IV x 1, vancomycin 2250mg IV x 1.[SB1.1] He is sleepy on exam but arousable and answers some questions appropriately but brief. Faint fine crackles on exam. Irregular heart rhythm. No focal neuro deficit.[SB1.3] DDx: Pneumonia, cellulitis, sepsis  - Follow up urine and blood cultures  - Continue Zosyn 4.5gm IV q6h  - Start Levaquin 750mg IV daily  - Tylenol 1gm po q6h prn fever/pain  - Hold Lasix and Spironolactone  - Place on Continuous Cardiac Monitor  - Strict I/O  - Daily weights  - Send Stat CBC, CMP, lactic acid, procalcitonin, CRP, BNP, VBG, TSH with reflex, troponin I    2. DM2: Last A1c was 7.3 on 4/20/18. On Glipizide 10mg qam and 15mg qpm.  in ED.   - Hold Glipizide for now  - SSI with Novolog medium resistance  - BG checks TID ac and Qhs  - Check HgbA1c  - Carbohydrate Consistent Diet  - Hypoglycemic protocol    3. CAD s/p CABG:   - Continue Lisinopril, Zocor  - Hold[SB1.1] Metoprolol,[SB1.3] Lasix and Spironolactone     4. Afib: INR 2.42.   - Pharmacy consult to dose coumadin  - INR qam    5. Left Foot Stump Erythema: - Per previous instructions apply adaptic and gauze over it. Change daily, when the ulcer no longer drains, the adaptic and gauze can be discontinued.[SB1.1]      6. Left Groin Candidiasis:[SB1.4] - Miconazole cream and powder.    Addendum: AM labs was with worsening lactic acid and WBC. Discussed patient with overnight Hospitalist MD, Levy Vo, who suggested repeating bolus and then lactic acid.[SB1.3]         HPI:    Ever Crane is a 73 year old male with a history of HTN, HLD, CAD, s/p CABG x 2 who presented to the ED via EMS with altered mental status. Per ED  "Note: \"History is extraordinarily limited at this time secondary to the patient's altered mental status.  EMS reported that the patient was found driving his car erratically, swerving back and forth, when he was retrieved. He was found with blood alcohol content of 0, glucose of 130. En route, they report that the patient became increasingly more confused and lethargic. He also did urinate on himself en route. The patient here states that he is feeling \"pretty good\". He states he has no pain. He denies history of seizure, heart problem, or diabetes. He denies use of any recreational drugs. Again, these admissions are in the setting of marked altered mental status and are thus very limited in interpretation. Additional history is obtained subsequently from nursing home at the patient's rehab facility, [facility] ([place], [phone]): PMH of diabetes with prior left forefoot amputation, polyneuropathy, CHF, prior CABG, atrial fibrillation, anticoagulated on Coumadin, PVD, hypertension, hyperlipidemia, GERD, anticoagulation on Coumadin. Also reports patient is a full code. Reported that the patient is normally oriented and alert, so his current mental status is quite unusual.\"    Upon arrival[SB1.1] from the ED to the Observation Unit[SB1.2] patient he is quite sleepy but easily arousable. I asked why he was stopped by the Police yesterday and he states its because he was driving erratically however he does not know why he was doing so. He states he was on his way from working. He reports he works as a . He admits to chills for the last 2 days. He admits to a cough more than his baseline.  Admits to generalized weakness.[SB1.1] He has BLE erythema which he states is not new or worse.[SB1.2] Denies any fever, nausea, abdominal pain, diarrhea, constipation, chest pain, SOB, palpitations, headache, changes in vision.     He currently lives at [facility]. Per Care Everywhere " notes: He will be discharging on Tuesday 6/26 to an [facility]. He has been long term care resident since completing snf stay following his transmetatarsal amputation of left foot on 11/5. He has been treated for multiple wounds and wound infections and is following with the wound clinic. He has also been treated for persistent candidiasis of perineal area due to noncompliance with medicated ointments and powders. He recently reinstated his 's license and has been driving himself to a local pharmacy and using over the counter creams instead of what has been prescribed. He was just today prescribed a different ointment miconazole/zinc/white petroleum cream to apply twice daily to perineal region. His blood sugars ranged from 130-300s and Hga1c was 7.3 on 4/20. He remains on glipizide and continues to be noncompliant with his diet. He saw cardiology last on 6/13 and recommending cardiac diet, low sodium intake, daily weights and continue on metoprolol, nitrostat, simvastatin. His weights have been trending up over past few months [phone] but his swelling remains stable and not short of breath. This is suspected due to noncompliance with diet and now that he has his license he has been frequently leaving the facility and eating fast food as well as working some as a . His renal function has been stable 1.3-1.6. He has hemorrhoids that flare from time to time but he was refusing to use anusol and using some over the counter cream instead. He continued to have hoarding behaviors during his stay, stockpiling old boxes, tissues, napkins, used plates, silverware, jelly containers in room and staff continued to re-educate on health risks. His neuropathy was stable. He was found an apartment but refused to move as he did not approve of the place, but now has found a different apartment that he is willing to move to. It is assisted living and he will have nursing to assist with his wound care. He was assigned to  "in house provider but per his report he may end up switching back to Dr. mak as the in house provider only rounds once per month and he would like to be seen weekly. He will be discharging on 6/26.\"       In the ED, HR 90's-110's, BP /50-84, RR 11-27, SaO2 % on RA-4LNC (placed on O2 while asleep), Temp 102.2. Labs show CMP with K 5.7, Bicarb 19, BUN/Creat 48/1.33, glucose 179. CBC with WBC of 20.9, H/H 13.1/39.4, platelet 126, abs neutrophil 19.1. INR 2.42. VBG with pH 7.39, CO2 33, O2 30. Lactic acid 1.4.UA with small blood otherwise normal. Urine culture and blood culture sent and pending. UDS negative. Head CT negative for any acute process. CXR shows small bilateral pleural effusions and adjacent basilar atelectasis and a minor consolidation; mild perihilar and mixed interstitial and airspace opacities may represent pulmonary edema; cardiomegaly. In the ED the patient was given 3L NS bolus, tylenol 650mg supp x 1, narcan 2mg IV x 1, zosyn 4.5gm IV x 1, vancomycin 2250mg IV x 1.     History:    Past Medical History:   Diagnosis Date     A-fib (H)      MESHA (acute kidney injury) (H)      CHF (congestive heart failure) (H)      Diabetes (H)      Epistaxis      GERD (gastroesophageal reflux disease)      HLD (hyperlipidemia)      HTN (hypertension)      Hyponatremia      Peripheral vascular disease (H)        Past Surgical History:   Procedure Laterality Date     CABG MEASURES GRP         No family history on file.    Social History     Social History     Marital status:      Spouse name: N/A     Number of children: N/A     Years of education: N/A     Occupational History     Not on file.     Social History Main Topics     Smoking status: Former Smoker     Smokeless tobacco: Not on file     Alcohol use 0.6 oz/week     1 Cans of beer per week     Drug use: No     Sexual activity: No     Other Topics Concern     Not on file     Social History Narrative     No narrative on file         No current " facility-administered medications on file prior to encounter.   No current outpatient prescriptions on file prior to encounter.    Data:    Results for orders placed or performed during the hospital encounter of 06/25/18   CT Head w/o Contrast    Narrative    CT HEAD W/O CONTRAST 6/25/2018 9:25 PM    Provided History: Altered mental status    Comparison: None.    Technique: Using multidetector thin collimation helical acquisition  technique, axial, coronal and sagittal CT images from the skull base  to the vertex were obtained without intravenous contrast.     Findings:    No intracranial hemorrhage, mass effect, or midline shift. The  ventricles are proportionate to the cerebral sulci. Mild generalized  parenchymal volume loss. The gray to white matter differentiation of  the cerebral hemispheres is preserved. There are patchy  periventricular/subcortical white matter hypodensities which are  nonspecific, but given the patient's age and intracranial vascular  calcifications, are favored to represent sequelae of chronic small  vessel ischemic disease. The basal cisterns are patent.     The visualized paranasal sinuses are clear. The mastoid air cells are  clear.       Impression    Impression: No acute intracranial pathology suspected.    I have personally reviewed the examination and initial interpretation  and I agree with the findings.    HUI PURDY MD   XR Chest Port 1 View    Narrative    Exam:  XR CHEST PORT 1 VW, 6/25/2018 8:37 PM    History: Altered mental status;     Comparison:  None available.    Findings:  Single AP view of the chest. Postoperative changes of CABG  including median sternotomy wires and mediastinal surgical clips.  Cardiac silhouette is enlarged. Trace bilateral pleural effusions and  adjacent basilar opacities. Perihilar and mixed interstitial and  airspace opacities. No pneumothorax. Visualized upper abdomen and  bones are unremarkable.      Impression    Impression:    1. Small  bilateral pleural effusions and adjacent basilar atelectasis  and/or minor consolidation.  2. Mild perihilar and mixed interstitial and airspace opacities  favored represent pulmonary edema.  3. Cardiomegaly.    I have personally reviewed the examination and initial interpretation  and I agree with the findings.    CLEMENTINE VENTURA MD   Comprehensive metabolic panel   Result Value Ref Range    Sodium 134 133 - 144 mmol/L    Potassium 5.7 (H) 3.4 - 5.3 mmol/L    Chloride 104 94 - 109 mmol/L    Carbon Dioxide 19 (L) 20 - 32 mmol/L    Anion Gap 12 3 - 14 mmol/L    Glucose 179 (H) 70 - 99 mg/dL    Urea Nitrogen 48 (H) 7 - 30 mg/dL    Creatinine 1.33 (H) 0.66 - 1.25 mg/dL    GFR Estimate 53 (L) >60 mL/min/1.7m2    GFR Estimate If Black 64 >60 mL/min/1.7m2    Calcium 9.0 8.5 - 10.1 mg/dL    Bilirubin Total 1.0 0.2 - 1.3 mg/dL    Albumin 3.9 3.4 - 5.0 g/dL    Protein Total 8.8 6.8 - 8.8 g/dL    Alkaline Phosphatase 104 40 - 150 U/L    ALT 23 0 - 70 U/L    AST 27 0 - 45 U/L   CBC with platelets differential   Result Value Ref Range    WBC 20.9 (H) 4.0 - 11.0 10e9/L    RBC Count 4.46 4.4 - 5.9 10e12/L    Hemoglobin 13.1 (L) 13.3 - 17.7 g/dL    Hematocrit 39.4 (L) 40.0 - 53.0 %    MCV 88 78 - 100 fl    MCH 29.4 26.5 - 33.0 pg    MCHC 33.2 31.5 - 36.5 g/dL    RDW 14.0 10.0 - 15.0 %    Platelet Count 126 (L) 150 - 450 10e9/L    Diff Method Automated Method     % Neutrophils 91.5 %    % Lymphocytes 2.4 %    % Monocytes 5.0 %    % Eosinophils 0.7 %    % Basophils 0.1 %    % Immature Granulocytes 0.3 %    Nucleated RBCs 0 0 /100    Absolute Neutrophil 19.1 (H) 1.6 - 8.3 10e9/L    Absolute Lymphocytes 0.5 (L) 0.8 - 5.3 10e9/L    Absolute Monocytes 1.1 0.0 - 1.3 10e9/L    Absolute Eosinophils 0.2 0.0 - 0.7 10e9/L    Absolute Basophils 0.0 0.0 - 0.2 10e9/L    Abs Immature Granulocytes 0.1 0 - 0.4 10e9/L    Absolute Nucleated RBC 0.0    Glucose by meter   Result Value Ref Range    Glucose 185 (H) 70 - 99 mg/dL   INR   Result Value Ref  Range    INR 2.42 (H) 0.86 - 1.14   UA with Microscopic   Result Value Ref Range    Color Urine Light Yellow     Appearance Urine Clear     Glucose Urine Negative NEG^Negative mg/dL    Bilirubin Urine Negative NEG^Negative    Ketones Urine Negative NEG^Negative mg/dL    Specific Gravity Urine 1.012 1.003 - 1.035    Blood Urine Small (A) NEG^Negative    pH Urine 5.0 5.0 - 7.0 pH    Protein Albumin Urine Negative NEG^Negative mg/dL    Urobilinogen mg/dL Normal 0.0 - 2.0 mg/dL    Nitrite Urine Negative NEG^Negative    Leukocyte Esterase Urine Negative NEG^Negative    Source Midstream Urine     WBC Urine <1 0 - 5 /HPF    RBC Urine 1 0 - 2 /HPF    Squamous Epithelial /HPF Urine <1 0 - 1 /HPF   CBC with platelets differential   Result Value Ref Range    WBC 23.4 (H) 4.0 - 11.0 10e9/L    RBC Count 4.17 (L) 4.4 - 5.9 10e12/L    Hemoglobin 12.1 (L) 13.3 - 17.7 g/dL    Hematocrit 36.2 (L) 40.0 - 53.0 %    MCV 87 78 - 100 fl    MCH 29.0 26.5 - 33.0 pg    MCHC 33.4 31.5 - 36.5 g/dL    RDW 13.9 10.0 - 15.0 %    Platelet Count 102 (L) 150 - 450 10e9/L    Diff Method Automated Method     % Neutrophils 92.7 %    % Lymphocytes 2.1 %    % Monocytes 4.7 %    % Eosinophils 0.0 %    % Basophils 0.1 %    % Immature Granulocytes 0.4 %    Nucleated RBCs 0 0 /100    Absolute Neutrophil 21.7 (H) 1.6 - 8.3 10e9/L    Absolute Lymphocytes 0.5 (L) 0.8 - 5.3 10e9/L    Absolute Monocytes 1.1 0.0 - 1.3 10e9/L    Absolute Eosinophils 0.0 0.0 - 0.7 10e9/L    Absolute Basophils 0.0 0.0 - 0.2 10e9/L    Abs Immature Granulocytes 0.1 0 - 0.4 10e9/L    Absolute Nucleated RBC 0.0    Comprehensive metabolic panel   Result Value Ref Range    Sodium 139 133 - 144 mmol/L    Potassium 4.8 3.4 - 5.3 mmol/L    Chloride 107 94 - 109 mmol/L    Carbon Dioxide 19 (L) 20 - 32 mmol/L    Anion Gap 13 3 - 14 mmol/L    Glucose 150 (H) 70 - 99 mg/dL    Urea Nitrogen 44 (H) 7 - 30 mg/dL    Creatinine 1.28 (H) 0.66 - 1.25 mg/dL    GFR Estimate 55 (L) >60 mL/min/1.7m2    GFR  Estimate If Black 67 >60 mL/min/1.7m2    Calcium 8.3 (L) 8.5 - 10.1 mg/dL    Bilirubin Total 1.1 0.2 - 1.3 mg/dL    Albumin 3.4 3.4 - 5.0 g/dL    Protein Total 7.7 6.8 - 8.8 g/dL    Alkaline Phosphatase 88 40 - 150 U/L    ALT 17 0 - 70 U/L    AST 13 0 - 45 U/L   Procalcitonin   Result Value Ref Range    Procalcitonin 0.48 ng/ml   INR   Result Value Ref Range    INR 2.57 (H) 0.86 - 1.14   Blood gas venous   Result Value Ref Range    Ph Venous 7.36 7.32 - 7.43 pH    PCO2 Venous 40 40 - 50 mm Hg    PO2 Venous 24 (L) 25 - 47 mm Hg    Bicarbonate Venous 23 21 - 28 mmol/L    Base Deficit Venous 2.7 mmol/L    FIO2 2.0    TSH with free T4 reflex   Result Value Ref Range    TSH 0.86 0.40 - 4.00 mU/L   Drug abuse screen 6 urine (chem dep) ([facility])   Result Value Ref Range    Amphetamine Qual Urine Negative NEG^Negative    Barbiturates Qual Urine Negative NEG^Negative    Benzodiazepine Qual Urine Negative NEG^Negative    Cannabinoids Qual Urine Negative NEG^Negative    Cocaine Qual Urine Negative NEG^Negative    Ethanol Qual Urine Negative NEG^Negative    Opiates Qualitative Urine Negative NEG^Negative   Troponin I   Result Value Ref Range    Troponin I ES <0.015 0.000 - 0.045 ug/L   Lactic acid whole blood   Result Value Ref Range    Lactic Acid 2.1 (H) 0.7 - 2.0 mmol/L   EKG 12 lead   Result Value Ref Range    Interpretation ECG Click View Image link to view waveform and result    ISTAT gases lactate mia POCT   Result Value Ref Range    Ph Venous 7.39 7.32 - 7.43 pH    PCO2 Venous 33 (L) 40 - 50 mm Hg    PO2 Venous 30 25 - 47 mm Hg    Bicarbonate Venous 20 (L) 21 - 28 mmol/L    O2 Sat Venous 57 %    Lactic Acid 1.4 0.7 - 2.1 mmol/L   Blood culture   Result Value Ref Range    Specimen Description Blood Left Arm     Special Requests Received in aerobic bottle only     Culture Micro PENDING    Blood culture   Result Value Ref Range    Specimen Description Blood Left Arm     Special Requests Received in aerobic bottle only      Culture Micro PENDING              EKG Interpretation:      EKG Number:[SB1.1] 1[SB1.2]  Interpreted by Jennifer Henderson PA-C   Symptoms at time of EKG:[SB1.1] altered mental status[SB1.2]   Rhythm:[SB1.1] Sinus tachycardia[SB1.2]  Rate:[SB1.1] 100-110[SB1.2]  Axis:[SB1.1] Normal[SB1.2]  Ectopy:[SB1.1] None and Premature ventricular contractions (unifocal)[SB1.2]  Conduction:[SB1.1] Normal[SB1.2]  ST Segments/ T Waves:[SB1.1] No ST-T wave changes and No acute ischemic changes[SB1.2]  Q Waves:[SB1.1] None[SB1.2]  Comparison to prior:[SB1.1] No old EKG available[SB1.2]    Clinical Impression:[SB1.1] sinus tachycardia[SB1.2]    ROS:    Review Of Systems  Skin:[SB1.1] left foot stump erythema[SB1.3]  Eyes:[SB1.1] negativ[SB1.3]  Ears/Nose/Throat:[SB1.1] negative[SB1.3]  Respiratory:[SB1.1] Cough-, No shortness of breath, No cough and No hemoptysis[SB1.3]  Cardiovascular:[SB1.1] negative[SB1.3]  Gastrointestinal:[SB1.1] negative[SB1.3]  Genitourinary:[SB1.1] negative[SB1.3]  Musculoskeletal:[SB1.1] negative[SB1.3]  Neurologic:[SB1.1] negative[SB1.3]  Psychiatric:[SB1.1] negative[SB1.3]  Hematologic/Lymphatic/Immunologic:[SB1.1] positive for chills, negative for and fever[SB1.3]  Endocrine:[SB1.1] diabetes[SB1.3]  PCP:[SB1.1] [facility][SB1.3]    10 point ROS negative other than the symptoms noted above.      Exam:  Vitals:  B/P: 134/53, T: 100.5, P: 111, R: 18    Constitutional:[SB1.1] no distress, cooperative and sleepy but arousable[SB1.3]  Head:[SB1.1] Normocephalic. No masses, lesions, tenderness or abnormalities[SB1.3]  Neck:[SB1.1] Neck supple. No adenopathy. Thyroid symmetric, normal size,, Carotids without bruits.[SB1.3]  ENT:[SB1.1] ENT exam normal, no neck nodes or sinus tenderness[SB1.3]  Cardiovascular:[SB1.1] PMI normal. No lifts, heaves, or thrills. Irregularly irregular. No murmurs, clicks gallops or rub[SB1.3]  Respiratory:[SB1.1] faint fine crackles in bases[SB1.3]  Gastrointestinal:[SB1.1]  Abdomen soft, non-tender. BS normal. No masses, organomegaly[SB1.3]  :[SB1.1] Deferred[SB1.3]  Musculoskeletal:[SB1.1] extremities normal- no gross deformities noted, gait normal and normal muscle tone[SB1.3]  Skin:[SB1.1] bilateral LE erythema from below knee down, no warmth[SB1.3]  Neurologic:[SB1.1] Reflexes normal and symmetric. Sensation grossly WNL. Sleepy but easily arousable. Does not know where he is. Knows what happened and why he is here. Knows date, name, , year, president.   P[SB1.3]sychiatric:[SB1.1] flat affect[SB1.3]  Hematologic/Lymphatic/Immunologic:[SB1.1] normal ant/post cervical, axillary, supraclavicular and inguinal nodes[SB1.3]    Consults:[SB1.1] none[SB1.3]  FEN:[SB1.1] diabetic diet; low sodium diet[SB1.3]  DVT prophylaxis:[SB1.1] SCD[SB1.3]  GI prophylaxis:[SB1.1] prilosec[SB1.3]  BM prophylaxis:[SB1.1] none[SB1.3]  Code Status:[SB1.1] full code[SB1.3]  Disposition:[SB1.1] home once mental status is back to baseline, labs and vitals improved and stable[SB1.3]    Signed:  Jennifer Henderson PA-C   2018 at 5:28 AM[SB1.1]       Revision History        User Key Date/Time User Provider Type Action    > SB1.3 2018  7:27 AM Jennifer Henderson PA Physician Assistant - YAMIL Sign     SB1.2 2018  6:22 AM Jennifer Henderson PA Physician Assistant - C      SB1.4 2018  6:17 AM Jennifer Henderson PA Physician Assistant - C      SB1.1 2018  5:28 AM Jennifer Henderson PA Physician Assistant - C                      Discharge Summaries      Discharge Summaries by Shadia Craft PA-C at 2018  8:18 AM     Author:  Shadia Craft PA-C Service:  Hospitalist Author Type:  Physician Assistant    Filed:  2018 12:53 PM Date of Service:  2018  8:18 AM Creation Time:  2018  8:18 AM    Status:  Cosign Needed :  Shadia Craft PA-C (Physician Assistant)    Cosign Required:  Yes             [facility]    [place]charge Summary- [facility]    Date of Admission:  6/25/2018  Date of Discharge:[RR1.1]  6/30/2018[RR1.2]  Discharging Provider:[RR1.1] Shadia Vu[RR1.3]/Dr. Gretchen Villalta  Discharge Team: [facility] 9[RR1.1]    Discharge Diagnoses[RR1.3]   Sepsis 2/2 CAP vs Aspiration Pneumonia  AMS  Mild Hyperbilirubinemia  HFpEF with Acute Exacerbation  Paroxysmal Atrial Fibrillation/Atrial Flutter  CAD  CKD Stage III  LE Edema  Venous Insufficiency  Perineal Candidiasis[RR1.4]  Follow-ups Needed After Discharge[RR1.3]    - Continue augmentin BID to complete 10-day course (6/28-7/7/18).  -[RR1.4] Take warfarin 4 mg on 6/30, and resume prior regimen of warfarin 1.5 mg daily until INR rechecked (recommend INR recheck on 7/2)  - Decrease lisinopril to 5 mg QD with close monitoring of BP's.[RR1.3]  - Do not drive until outpatient driving evaluation completed.[RR1.4]    Hospital Course[RR1.3]   Ever Crane was admitted on 6/25/2018 for[RR1.1] AMS and sepsis found 2/2 pneumonia[RR1.3]. The following problems were addressed during his hospitalization:[RR1.1]    Sepsis 2/2 CAP vs Aspiration PNA. Found altered in car, noted to be tachypneic and febrile (temp 102.2 F) in ED. Work-up revealed WBC 20.9, LA 1.4, VBG with mild hypocapnia, CXR with small bilateral pleural effusions and adjacent basilar atelectasis and/or minor consolidation; also noted mild perihilar and mixed interstitial and airspace opacities likely representing pulmonary edema. Started on vancomycin/zosyn for suspected CAP[RR1.4] vs aspiration pneumonia[RR1.3] with subsequent improvement in symptoms. Transitioned to levofloxacin 6/26 with plans to discharge on PO although spiked a fever up to 102.7 F and was transferred to medicine for further evaluation. Broadened to vancomycin and cefepime on 6/27[RR1.4] and narrowed to augmentin 6/29 without any recurrent fevers[RR1.3].[RR1.4] WBC normalized and CRP  down-trending at time of discharge. Patient endorses somewhat productive cough, no SOB. Remained stable on room air throughout admission. Continue augmentin to complete 10-day course (6/28-7/7/18).   [RR1.3]  AMS. Presented after being found driving erratically and was minimally responsive. Noted to be tachypneic and febrile in ED. Work-up with CLAIRE 0.0, U tox negative, , elevated WBC of 20.9, VBG with pH 7.39/pCO2 33/HCO3 20, bland UA, CT head negative, CXR with small bilateral pleural effusions and adjacent basilar atelectasis and/or consolidation.[RR1.4] EEG noted mild diffuse encephalopathy although no seizure-like activity.[RR1.3] Started on[RR1.4] vancomycin/zosyn[RR1.3] for[RR1.4] suspected PNA[RR1.3] with subsequent improvement in mentation. Highest suspicion for infection as etiology of AMS, other possibilities include seizure activity, ingestion, other CNS source.[RR1.4] Patient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   [phone]    ON CALL COVERAGE AFTER 1600  [phone][EL1.1]         Revision History        User Key Date/Time User Provider Type Action    > EL1.1 6/30/2018 11:52 AM Christal Huynh LICSW  Sign            Progress Notes by Leonela Hawthorne RN at 6/30/2018 10:24 AM     Author:  Leonela Hawthorne RN Service:  (none) Author Type:  Care Coordinator    Filed:  6/30/2018 10:25 AM Date of Service:  6/30/2018 10:24 AM Creation Time:  6/30/2018 10:24 AM    Status:  Signed :  Leonela Hawthorne RN (Care Coordinator)           Care Coordinator Progress Note    Admission Date/Time:  6/25/2018  Attending MD:  Michelle Lauren MD    Data  Chart reviewed, discussed with interdisciplinary team.   Patient was admitted for:    Pneumonia due to infectious organism, unspecified laterality, unspecified part of lung  Altered mental status, unspecified altered mental status type  Type 2 diabetes mellitus without complication, unspecified long term insulin use status (H)  Atherosclerosis of native coronary artery, angina presence unspecified, unspecified whether native or transplanted heart  Atrial fibrillation, unspecified type (H)  Erythematous  condition  Candidiasis of skin and nails  Encounter for long-term (current) use of insulin (H)  Long-term (current) use of anticoagulants  Postsurgical aortocoronary bypass status  Candidal intertrigo  Benign essential hypertension.    RN CC consult for discharge planning.  Per chart review noted that SW following and patient will d/c back to LTC facility.   Please refer to SW notes for additional information.    Nettie Hawthorne RN BSN, PHN Weekend RN Care Coordinator   [email or web address]  Pager [phone]  6/30/2018 10:25 AM[JM1.1]           Revision History        User Key Date/Time User Provider Type Action    > JM1.1 6/30/2018 10:25 AM Leonela Hawthorne RN Care Coordinator Sign            Progress Notes by Indira Disla OT at 6/30/2018  9:56 AM     Author:  Indira Disla OT Service:  (none) Author Type:  Occupational Therapist    Filed:  6/30/2018  9:57 AM Date of Service:  6/30/2018  9:56 AM Creation Time:  6/30/2018  9:57 AM    Status:  Signed :  Indira Disla OT (Occupational Therapist)          06/30/18 0800   Quick Adds   Type of Visit Initial Occupational Therapy Evaluation   Living Environment   Lives With facility resident   Living Arrangements other (see comments)  ((Long term care [facility], planning to transition [facility]))   Home Accessibility no concerns   Living Environment Comment Pt reports he drives at baseline, has been at LTC after surgery last november, was going to transition to [facility] but now admitted to hospital and between residences    Self-Care   Dominant Hand right   Usual Activity Tolerance good   Current Activity Tolerance good   Regular Exercise no   Equipment Currently Used at Home none   Activity/Exercise/Self-Care Comment reports IND at baseline until 4 days ago   Functional Level Prior   Ambulation 0-->independent   Transferring 0-->independent   Toileting 0-->independent   Bathing 0-->independent   Dressing 0-->independent   Eating 0-->independent   Communication  "0-->understands/communicates without difficulty   Swallowing 0-->swallows foods/liquids without difficulty   Cognition 0 - no cognition issues reported   Fall history within last six months no   Which of the above functional risks had a recent onset or change? cognition   Prior Functional Level Comment pt reports 1 fall about 6 months ago, none recently        Present no   General Information   Onset of Illness/Injury or Date of Surgery - Date 06/25/18   Referring Physician Paige Sue   Patient/Family Goals Statement stay at LTC   Additional Occupational Profile Info/Pertinent History of Current Problem Ever Crane is a 73 year old male admitted on 6/25/2018. 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 Presented to the ED where he was noted to be febrile and tachypneic, felt possibly related to CAP and admitted to ED observation for antibiotics. Initially improved although began spiking fevers again and was transferred to medicine 6/27 for ongoing management.   Precautions/Limitations fall precautions   General Observations amb with assist   General Info Comments pt reports he has had episode of \"erratic\" driving in past    Cognitive Status Examination   Orientation orientation to person, place and time   Level of Consciousness alert   Able to Follow Commands WNL/WFL   Personal Safety (Cognitive) moderate impairment   Memory intact   Attention No deficits were identified   Cognitive Comment insight deficits, safety concerns   Visual Perception   Visual Perception No deficits were identified   Sensory Examination   Sensory Quick Adds No deficits were identified   Pain Assessment   Patient Currently in Pain No "   Integumentary/Edema   Integumentary/Edema other (describe)   Integumentary/Edema Comments L LE swelling and redness   Posture   Posture not impaired   Range of Motion (ROM)   ROM Quick Adds No deficits were identified   Strength   Manual Muscle Testing Quick Adds No deficits were identified   Coordination   Upper Extremity Coordination No deficits were identified   Transfer Skill: Bed to Chair/Chair to Bed   Level of [place]anner: Bed to Chair contact guard   Transfer Skill: Sit to Stand   Level of [place]: Sit/Stand independent   Transfer Skill: Toilet Transfer   Level of [place]: Toilet contact guard   Upper Body Dressing   Level of B[place]: Dress Upper Body independent   Lower Body Dressing   Level of Banner: Dress Lower Body independent   Toileting   Level of [place]: Toilet stand-by assist   Grooming   Level of Banner: Grooming stand-by assist   Eating/Self Feeding   Level of Banner: Eating independent   Instrumental Activities of Daily Living (IADL)   Previous Responsibilities driving   Activities of Daily Living Analysis   Impairments Contributing to Impaired Activities of Daily Living balance impaired;cognition impaired   General Therapy Interventions   Planned Therapy Interventions ADL retraining;cognition;transfer training;progressive activity/exercise   Clinical Impression   Criteria for Skilled Therapeutic Interventions Met yes, treatment indicated   OT Diagnosis ADL deficits   Influenced by the following impairments cognition, balance   Assessment of Occupational Performance 1-3 Performance Deficits   Identified Performance Deficits driving, IADLs, home management   Clinical Decision Making (Complexity) Moderate complexity   Therapy Frequency 5 times/wk   Predicted Duration of Therapy Intervention (days/wks) 3 days   Anticipated Discharge Disposition Long Term Care Facility   Risks and Benefits of Treatment have been explained. Yes   Patient, Family & other staff  "in agreement with plan of care Yes   [facility] TM \"6 Clicks\"   2016, Trustees of [facility], under license to [employer].  All rights reserved.   6 Clicks Short Forms Daily Activity Inpatient Short Form   [facility] AM-PAC  \"6 Clicks\" Daily Activity Inpatient Short Form   1. Putting on and taking off regular lower body clothing? 4 - None   2. Bathing (including washing, rinsing, drying)? 3 - A Little   3. Toileting, which includes using toilet, bedpan or urinal? 4 - None   4. Putting on and taking off regular upper body clothing? 4 - None   5. Taking care of personal grooming such as brushing teeth? 4 - None   6. Eating meals? 4 - None   Daily Activity Raw Score (Score out of 24.Lower scores equate to lower levels of function) 23   Total Evaluation Time   Total Evaluation Time (Minutes) 10[NA1.1]        Revision History        User Key Date/Time User Provider Type Action    > NA1.1 6/30/2018  9:57 AM Indira Disla OT Occupational Therapist Sign            Progress Notes by Caitlyn Donahue MSW at 6/29/2018 11:00 AM     Author:  Caitlyn Donahue MSW Service:  Social Work Author Type:      Filed:  6/30/2018  7:29 AM Date of Service:  6/29/2018 11:00 AM Creation Time:  6/30/2018  6:53 AM    Status:  Addendum :  Caitlyn Donahue MSW ()         Social Work Services Progress Note    Hospital Day: 3 on obs  Date of Initial Social Work Evaluation:  Not Completed   Collaborated with:  Snehal Millan ([place] [phone] f: [phone])Anais PH: [phone]    Data:  Pt is a 73 year old male being followed by LINDEN for return to LTC.    Intervention:  Pt continues to not be able to discharge due to fevers.  Will continue to follow for discharge when medically stable.     SW also received call from pt's  Anais ([employer]) PH: [phone] who is requesting a call with updates on pt's discharge plan.  LINDEN updated that the " plan is to return to LTC at discharge.    Assessment:  Did not meet with pt for this encounter note.    Plan:    Anticipated Disposition:  Facility:  Return to [facility]    Barriers to d/c plan: Fever, medical stability    Follow Up:  SW to follow for discharge planning.    SUKI Luna, APSW  6C Unit   Phone: [phone]  Pager: [phone]  Unit: [phone][LH1.1]           Revision History        User Key Date/Time User Provider Type Action    > [N/A] 6/30/2018  7:29 AM Caitlyn Donahue MSW  Addend     LH1.1 6/30/2018  6:56 AM Caitlyn Donahue MSW  Sign            Progress Notes by Silke Santos RN at 6/29/2018  7:02 PM     Author:  Silke Santos RN Service:  (none) Author Type:  Registered Nurse    Filed:  6/29/2018  7:08 PM Date of Service:  6/29/2018  7:02 PM Creation Time:  6/29/2018  7:02 PM    Status:  Signed :  Silke Santos RN (Registered Nurse)         Patient arrived from  via wheelchair accompanied by transport . Patient A & O X 3, settled to room 228. Patient BLE erythema, with Partial amputation of left foot. Patient orient to call light , bathroom and unite routine . Patient BS , 175 and insulin given per order. Call light in reach, bed alarm on for safety. Continue monitor closely and update MD with concerns.[MA1.1]      Revision History        User Key Date/Time User Provider Type Action    > MA1.1 6/29/2018  7:08 PM Silke Santos RN Registered Nurse Sign            Progress Notes by Radha Rdz at 6/29/2018  7:00 AM     Author:  Radha Rdz Service:  (none) Author Type:  Technician    Filed:  6/29/2018  2:05 PM Encounter Date:  6/29/2018 Status:  Signed    :  Radha Rdz (Technician)           CPT: 43216  UU/VEEG/IP Unit 6  [place][ES1.1]     Revision History        User Key Date/Time User Provider Type Action    > ES1.1 6/29/2018  2:05 PM Radha Rdz Technician Sign            Progress Notes  by Jennifer Clemente at 6/29/2018  1:57 PM     Author:  Jennifer Clemente Service:  Pharmacy Author Type:  Pharmacy Intern    Filed:  6/29/2018  1:59 PM Date of Service:  6/29/2018  1:57 PM Creation Time:  6/29/2018  1:57 PM    Status:  Attested :  Jennifer Clemente (Pharmacy Intern)    Cosigner:  Kathleen Andrade RPH at 6/29/2018  2:04 PM        Attestation signed by Kathleen Andrade RPH at 6/29/2018  2:04 PM        I agree with the assessment and recommendations as documented by the pharmacy student. Of note, after the student's note was generated, the patient has been changed to Augmentin. Would monitor carefully for clinical improvement on PO therapy, and still may wish to consider MRSA nares swab to assess colonization status.                               [facility], [place]   Antimicrobial Management Team (AMT) Note              To: Medicine Gold 9  Unit: 6D  Allergies   Allergen Reactions     Latex      Penicillins Rash     When he was a child  Tolerated Zosyn 6/2018, also tolerated Augmentin?       Brief Summary: Ever Crane is a 72 yo male with PMH of T2DM, left forefoot amputation, polyneuropathy, HFpEF, CAD s/p CABG (2000), parox A-fib (warfarin), PVD, HTN, HLD, and GERD. Prior to admission he was residing in a rehab facility. He was found driving his car erratically so he was taken to the ED via EMS for evaluation of altered mental status. He was admitted to observation on 6/25/18 with altered mental status, fever, and possible pneumonia.     HPI: On admission, he had an elevated WBC (20.9), Scr (1.33), BUN (48), ANC (19.1), temperature (101.1), and HR (111). He had basilar crackles with left being worse than right. He was started on vancomycin and Zosyn. On 6/26, His WBC (23.4) and ANC (21.7) increased further and he was switched to levofloxacin.   On 6/27, his RR (40) and temperature (102.7) increased and he was transferred to internal medicine. He had a chest  x-ray done that found stable bilateral interstitial opacities. The primary differential included pulmonary edema versus infection. He also had an US and x ray of the abdomen, An ECHO, and a head CT this admission all of which were negative for potential infectious sources. He was then switched to cefepime and vancomycin for broader coverage.  On 6/28 and today, the patient was afebrile with WBC wnl. His physical exam was positive for faint fine crackles in lung bases with L>R, otherwise no wheezing, and good air movement throughout.   His blood (6/25 x 2, 6/27 x 2, and a yeast culture 6/28) and urine cultures (6/25) have resulted in no growth. A sputum culture was ordered but never collected. He did have a MRSA infection in 2014 in a head wound.     Assessment:   Pneumonia - CAP vs. HCAP  The patient s fever, WBC, physical exam, imaging, and altered mental status are all consistent with a diagnosis of pneumonia. Unclear per chart review the nature of the facility the patient was residing in prior to admission, so causative organisms could be those more commonly associated with CAP, versus those associated with HCAP. No sputum cultures were taken, so antibiotic treatment must be empiric. Per the IDSA CAP guidelines, a respiratory fluoroquinolone or a beta lactam plus a macrolide are appropriate empiric regimens and vancomycin should be added if concern for MRSA. Because he lives in a rehab facility, he may be at an increased risk of a current MRSA pneumonia. However the imaging is slightly inconsistent with the typical appearance of MRSA pneumonia. Therefore, a MRSA nares swab is recommended. If this returns negative, discontinue vancomycin. The patient did spike a fever on levofloxacin. However, this single rise in temperature is not fully indicative of a levofloxacin failure, as it occasionally takes several days for defervescence in pneumonia. Additionally, his antibiotic course changed several times during the  admission. Levofloxacin provides a convenient PO option for potential discharge that would cover many pathogens potentially implicated in CAP. Since the patient is afebrile and otherwise clinically stable, switching the cefepime to oral levofloxacin to complete a seven day total course should be considered. Otherwise, the cefepime IV can be continued over the weekend to complete the course.    Recommendations:  1). Obtain MRSA nares and discontinue vancomycin if the results are negative.  2). Consider switching cefepime to oral levofloxacin.    Discussed with ID Staff - Dr. Amrit Clemente PharmD 4 Student    Current Anti-infective Orders:  Anti-infectives (Future)    Start     Dose/Rate Route Frequency Ordered Stop    06/29/18 1230  amoxicillin-clavulanate (AUGMENTIN) 875-125 MG per tablet 1 tablet      1 tablet Oral EVERY 12 HOURS SCHEDULED 06/29/18 1212            Vitals and other clinical features  Vitals       06/27 0700  -  06/28 0659 06/28 0700  -  06/29 0659 06/29 0700  -  06/29 1357   Most Recent    Temp ( F) 97.8 -  102.7    98.2 -  98.8    97.9 -  98.4     97.9 (36.6)    Pulse   85    70 -  84       70    Heart Rate 86 -  104    70 -  96    72 -  80     72    Resp 17 -  (!)40    18 -  20      16     16    /67 -  132/69    91/61 -  125/79    98/63 -  99/66     98/63    SpO2 (%) 92 -  95    94 -  100      96     96        Temperature curve:      Labs  Estimated Creatinine Clearance: 61.5 mL/min (based on Cr of 1.34).  Recent Labs   Lab Test  06/29/18 0624 06/28/18 0624 06/27/18 2045 06/27/18 0618 06/26/18   0339  06/25/18 2004   CR  1.34*  1.13  1.21  1.17  1.28*  1.33*       Recent Labs   Lab Test  06/29/18 0624 06/28/18 0624 06/27/18 2045 06/27/18 0618 06/26/18   1200  06/26/18   0339  06/25/18 2004   WBC  7.4  9.2  11.3*  12.2*  15.3*  23.4*  20.9*   ANEU   --    --   9.6*  10.5*  14.1*  21.7*  19.1*   ALYM   --    --   0.8  0.9  0.8  0.5*  0.5*   CEFERINO   --     --   0.7  0.6  0.4  1.1  1.1   AEOS   --    --   0.1  0.1  0.0  0.0  0.2   HGB  10.5*  10.5*  10.6*  10.2*  11.2*  12.1*  13.1*   HCT  30.3*  30.9*  31.7*  31.5*  34.4*  36.2*  39.4*   MCV  84  85  87  87  88  87  88   PLT  95*  89*  93*  89*  93*  102*  126*       Recent Labs   Lab Test  06/29/18 0624 06/27/18 2045 06/26/18 0339  06/25/18   2004   BILITOTAL  1.0  1.4*  1.1  1.0   ALKPHOS  73  74  88  104   ALBUMIN  2.8*  2.7*  3.4  3.9   AST  24  26  13  27   ALT  25  16  17  23       Recent Labs   Lab Test  06/29/18 0624 06/28/18 0624 06/27/18 2045 06/27/18 0618 06/26/18   0853  06/26/18   0339   LACT   --    --    --    --   2.2*  2.1*   CRP  120.0*  160.0*   --   150.0*   --   50.0*   SED   --    --   50*   --    --    --    PCAL   --   0.37  0.33   --    --   0.48     No lab results found.    Invalid input(s): AMIK    Culture results with specimen source  Culture Micro   Date Value Ref Range Status   06/28/2018 No growth after 1 day  Preliminary   06/27/2018 No growth after 2 days  Preliminary   06/27/2018 No growth after 2 days  Preliminary   06/25/2018 No growth  Final   06/25/2018 No growth after 4 days  Preliminary   06/25/2018 No growth after 4 days  Preliminary    Specimen Description   Date Value Ref Range Status   06/28/2018 Blood Right Arm  Final   06/27/2018 Blood Left Hand  Final   06/27/2018 Blood Right Hand  Final   06/25/2018 Catheterized Urine  Final   06/25/2018 Blood Left Arm  Final   06/25/2018 Blood Left Arm  Final        Urine Studies     Recent Labs   Lab Test  06/25/18   2216   URINEPH  5.0   NITRITE  Negative   LEUKEST  Negative   WBCU  <1       CSF Testing  No lab results found.    Respiratory Virus Testing    No results found for: RVSPEC, IFLUA, FLUAH1, FLUAH3, PP8563, IFLUB, RSVA, RSVB, PIV1, PIV2, PIV3, HMPV, HRVS, ADVBE, ADVC  Last check of C difficile  No results found for: CDBPCT    Imaging:  Xr Chest 2 Views    Result Date: 6/28/2018  Exam: XR CHEST 2 VW,  6/27/2018 9:46 PM Indication: CAP Comparison: Chest x-ray 6/25/2018 Findings: Postsurgical changes of coronary artery bypass surgery. Heart is enlarged. Unchanged bilateral interstitial opacities. No focal consolidation, pleural effusion, or pneumothorax.     Impression: Stable bilateral interstitial opacities. Primary differential includes pulmonary edema versus infection. I have personally reviewed the examination and initial interpretation and I agree with the findings. CHIN SMITH MD    Us Abdomen Limited Portable    Result Date: 6/28/2018  EXAMINATION: US ABDOMEN RIGHT UPPER QUADRANT,  6/28/2018 10:40 AM COMPARISON: None. HISTORY: Intermittent fevers, mildly elevated bilirubin. TECHNIQUE: The abdomen was scanned in standard fashion with specialized ultrasound transducer(s) using both gray-scale and limited color Doppler techniques. Findings: Liver: The liver is enlarged measuring 17.9 cm. demonstrates normal homogeneous echotexture. No evidence of a focal hepatic mass. Gallbladder: Cholelithiasis. No gallbladder wall thickening. No pericholecystic fluid. Negative sonographic Perry's sign. Biliary sludge. Bile Ducts: Both the intra- and extrahepatic biliary system are of normal caliber.  The common bile duct measures 6 mm in diameter. Pancreas: Visualized portions of the head and body of the pancreas are unremarkable. Right kidney with normal echotexture, without solid mass or hydronephrosis.   Benign-appearing cyst measuring 1.5 cm. The right kidney measures 10.3 cm in length. Fluid: No pleural effusion. No free fluid in the right upper quadrant of the abdomen.     Impression: 1.  Hepatomegaly. No focal liver lesions. 2.  Cholelithiasis. No associated sonographic findings of acute cholecystitis. SEDRICK BIRMINGHAM MD    Xr Chest Port 1 View    Result Date: 6/25/2018  Exam:  XR CHEST PORT 1 VW, 6/25/2018 8:37 PM History: Altered mental status; Comparison:  None available. Findings:  Single AP view of  the chest. Postoperative changes of CABG including median sternotomy wires and mediastinal surgical clips. Cardiac silhouette is enlarged. Trace bilateral pleural effusions and adjacent basilar opacities. Perihilar and mixed interstitial and airspace opacities. No pneumothorax. Visualized upper abdomen and bones are unremarkable.     Impression:  1. Small bilateral pleural effusions and adjacent basilar atelectasis and/or minor consolidation. 2. Mild perihilar and mixed interstitial and airspace opacities favored represent pulmonary edema. 3. Cardiomegaly. I have personally reviewed the examination and initial interpretation and I agree with the findings. CLEMENTINE VENTURA MD    Xr Abdomen Port 1 View    Result Date: 6/28/2018  Exam: XR ABDOMEN PORT 1 VW, 6/28/2018 1:56 AM Indication: AMS, fever, distention. Please eval for pathology; Comparison: None Findings: Postsurgical changes of coronary artery bypass surgery. Mild cardiomegaly. Calcified right hilar and pulmonary granulomas. No dilated loop of bowel. There is a 2.5 cm calcification projecting over the gallbladder/right kidney.     Impression: 1. 2.5 cm calcification in the right abdomen probably represents a gallstone. Differential includes a renal calculus. Consider further evaluation with ultrasound. 2. Normal bowel gas pattern. [Consider Follow Up: Right upper quadrant abdominal ultrasound] This report will be copied to the [facility] to ensure a provider acknowledges the finding. I have personally reviewed the examination and initial interpretation and I agree with the findings. ALEXSANDRA REDDING MD    Ct Head W/o Contrast    Result Date: 6/25/2018  CT HEAD W/O CONTRAST 6/25/2018 9:25 PM Provided History: Altered mental status Comparison: None. Technique: Using multidetector thin collimation helical acquisition technique, axial, coronal and sagittal CT images from the skull base to the vertex were obtained without intravenous contrast. Findings:   No intracranial hemorrhage, mass effect, or midline shift. The ventricles are proportionate to the cerebral sulci. Mild generalized parenchymal volume loss. The gray to white matter differentiation of the cerebral hemispheres is preserved. There are patchy periventricular/subcortical white matter hypodensities which are nonspecific, but given the patient's age and intracranial vascular calcifications, are favored to represent sequelae of chronic small vessel ischemic disease. The basal cisterns are patent. The visualized paranasal sinuses are clear. The mastoid air cells are clear.      Impression: No acute intracranial pathology suspected. I have personally reviewed the examination and initial interpretation and I agree with the findings. HUI PURDY MD    Us Lower Extremity Venous Duplex Right    Result Date: 6/28/2018  EXAMINATION: DOPPLER VENOUS ULTRASOUND OF THE RIGHT LOWER EXTREMITY, 6/27/2018 10:13 PM COMPARISON: None. HISTORY: Right lower extremity edema, evaluate for DVT TECHNIQUE:  Gray-scale evaluation with compression, spectral flow, and color Doppler assessment of the deep venous system of the right leg from groin to knee, and then at the ankle. FINDINGS: In the right lower extremity, the common femoral, femoral, popliteal and posterior tibial veins demonstrate normal compressibility and blood flow. Subcutaneous edema at the right ankle. Comparison evaluation of the left common femoral vein and great saphenous vein demonstrates full compressibility and normal venous waveforms.     IMPRESSION: No evidence of right lower extremity deep venous thrombosis. I have personally reviewed the examination and initial interpretation and I agree with the findings. ALEXSANDRA REDDING MD[MS1.1]            Revision History        User Key Date/Time User Provider Type Action    > MS1.1 6/29/2018  1:59 PM Jennifer Clemente Pharmacy Intern Sign            Progress Notes by Shadia Craft PA-C at 6/29/2018   7:57 AM     Author:  Shadia Craft PA-C Service:  Hospitalist Author Type:  Physician Assistant    Filed:  6/29/2018 12:58 PM Date of Service:  6/29/2018  7:57 AM Creation Time:  6/29/2018  7:57 AM    Status:  Attested :  Shadia Craft PA-C (Physician Assistant)    Cosigner:  Maurizio Gutierrez MD at 6/29/2018  1:51 PM        Attestation signed by Maurizio Gutierrez MD at 6/29/2018  1:51 PM        Attestation:  IMaurizio, saw and evaluated Ever Crane as part of a shared visit.  I have reviewed and discussed with the advanced practice provider their history, physical and plan.     I have reviewed today's care team notes, Medications, Vital Signs, Labs and imaging.    My key history or physical exam findings:   Fevers resolved. Appears to be at baseline mental state. No new issues.    Physical exam:  General: Alert and oriented, well nourished, in NAD  Chest/Pulmonary: breathing comfortably, no tachypnea   Cardiovascular: S1, S2 normal, regular rate and rhythm and no murmur, no JVD. Distal pulses 2+. Chronic venous stasis changes  Abdomen: NABS, soft, non-tender, non-distended, no guarding, no rebound, no masses palpable and no hepatomegaly  Skin: no diaphoresis, skin color normal    Key management decisions made by me:   Unclear etiology of AMS but seems to have cleared.   D/c EEG  Transition from vanc/cefepime to augmentin  Hold off on further diuresis for today.     Steve Gutierrez MD   of Medicine  [facility] Hospitalist  Pager [phone]    Date of service: 06/29/18                               [facility], [place]    Internal Medicine Progress Note - Gold Service      Assessment & Plan   Ever Crane is a 73 year old male admitted on 6/25/2018. 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 Presented to the ED where he was noted to be febrile and tachypneic, felt possibly related to CAP and admitted to ED observation for antibiotics. Initially improved although began spiking fevers again and was transferred to medicine 6/27 for ongoing management.    Fevers, Leukocytosis Suspected 2/2 CAP. Found altered in car, noted to be tachypneic and febrile (temp 102.2 F) in ED. Work-up revealed WBC 20.9, LA 1.4, VBG with mild hypocapnia (pH 7.39, pCO2 of 33, HCO3 of 20), CXR with small bilateral pleural effusions and adjacent basilar atelectasis and/or minor consolidation; also noted mild perihilar and mixed interstitial and airspace opacities likely representing pulmonary edema. Started on vancomycin/zosyn for suspected CAP with subsequent improvement in symptoms. Transitioned to levofloxacin 6/26 with plans to discharge on PO although spiked a fever up to 102.7 F and was transferred to medicine for further evaluation. Broadened to vancomycin and cefepime on 6/27.[RR1.1] Has now remained afebrile x 24 hours,[RR1.2] WBC normalized at 7.4 (9.2), procal 0.37 (0.33),  (160), patient breathing comfortably on RA. BCx remain NGTD. Denies cough, SOB.[RR1.1] Plan to transition to PO augmentin today and monitor for fevers overnight.[RR1.2]  -[RR1.1] D/c IV vanc/cefepime and trial PO augmentin (discussed with pharmacy as patient has penicillin allergy from childhood but has since tolerated zosyn without issue; recommend starting augmentin and monitoring for any reaction)[RR1.2]  - Follow BCx  - Sputum cultures ordered[RR1.1] although patient not having productive cough[RR1.2]  - Aggressive IS  - Monitor fever curve  - PRN tylenol  - Trend CBC, CRP, procal    AMS ([RR1.1]resolved[RR1.2]). Presented after being found driving erratically and was minimally responsive. Noted to be tachypneic  and febrile in ED. Work-up with CLAIRE 0.0, U tox negative, , elevated WBC of 20.9, VBG with pH 7.39/pCO2 33/HCO3 20, bland UA, CT head negative, CXR with small bilateral pleural effusions and adjacent basilar atelectasis and/or consolidation. Started on vancomycin/zosyn for possible CAP with subsequent improvement in mentation. Highest suspicion for infection as etiology of AMS, other possibilities include seizure activity, ingestion, other CNS source. Started on high-dose thiamine 6/28 x 3 days. Currently A&O x 4[RR1.1].[RR1.2] Of note patient has had 's license revoked in the past due to erratic driving.   - Continue IV thiamine 500 mg q8h x 3 days  - EEG monitoring  - PT/OT consulted  - Fall precautions    Mild Hyperbilirubinemia. T bili mildly elevated at 1.4 with direct bilirubin of 0.3. Abdominal XR 6/27 revealed 2.5 cm calcification in right abdominal likely representing a gallstone. In setting of fevers, obtained abdominal US to further evaluate for cholecystitis. US revealed hepatomegaly and cholelithiasis with no associated findings of acute cholecystitis.     HFpEF with Acute Exacerbation. Recently admitted to OSH 1/2018 with HF exacerbation ( at that time - normal <70). Maintained on metoprolol, lisinopril, lasix, and spironolactone PTA. EDW ~196-199 lbs. BNP elevated at ~2000 on admission; echo revealed normal LV function, EF 55-60%, normal to mildly reduced RVF, mild pulmonary HTN. Lasix and spironolactone held on admission due to concerns for sepsis, also received 3 + liters of IVF.[RR1.1] Received dose of IV lasix 6/28, 6/29[RR1.2].[RR1.1] Cardiology consulted and recommending daily IV lasix as tolerated, also started on 2 L FR. D-dimer mildly elevated at 0.6 although patient clinically improving, will not workup any further at this time.[RR1.2] 2+[RR1.1] pitting edema in RLE[RR1.2] on exam, patient denies SOB or PND.  - Continue metoprolol 50 mg BID  -[RR1.1] No IV diuresis today as  Cr bumped; will reassess tomorrow AM[RR1.2]  -[RR1.1] Continue[RR1.2] lisinopril 10 mg QD and spironolactone 25 mg QD  - Cardiology[RR1.1] following, appreciate assistance[RR1.2]  - I&O, daily weights    Paroxysmal Atrial Fibrillation, Atrial Flutter. S/p cardioversion in 2011. On metoprolol per above for rate control/HF. CHADSVASc score of 5, maintained on warfarin for anticoagulation. Initially in sinus tachycardia on admission, repeat EKG this AM revealed atrial flutter; patient asymptomatic. INR subtherapeutic at 1.[RR1.1]55[RR1.2].  - Continue metoprolol per above  - Warfarin dosing per pharmacy  - Daily INR    CAD. S/p CABG in 2000 with SVG to OM1 and PDA. Maintained on simvastatin PTA.  - Continue simvastatin 40 mg QHS    CKD Stage III. Cr 1.33 on admission, recent BL ~1.3-1.4. UA bland on admission.[RR1.1] Renal function initially improved after[RR1.2] receiving 3+ liters of IVF,[RR1.1] now slightly up with IV diuresis - Cr 1.34 (1.13) this AM. Will hold off on further IV diuresis at this time.[RR1.2]  - I&O, daily weights  - Avoid nephrotoxic agents  - Trend BMP    DM Type II. HgbA1C 7.7% on 6/2018. Maintained on glipizide and metformin PTA although held on admission. Currently on sliding scale, BG[RR1.1] 140-270's[RR1.2] over past 24 hours.  - Continue holding PTA metformin and glipizide  - Medium SSI  - Moderate CHO diet  - BG checks qAC, qHS  - Hypoglycemia protocol    LE Edema, Venous Insufficiency. Chronic. Noted RLE swelling > left on admission. RLE US negative for DVT on 6/27. No open wounds on exam, prominent erythema/violaceous discoloration extending from ankles to just below knee. No active signs of infection at this time.  - Elevate legs when in bed  - Monitor for skin changes    Perineal Candidiasis. Reportedly present for several months, has had issues with medication compliance.  - Continue miconazole cream/powder BID  - Monitor    # Pain Assessment:[RR1.1]  Current Pain Score 6/29/2018    Patient currently in pain? denies   Pain location -   Pain descriptors -[RR1.3]   Ever s pain level was assessed and he currently denies pain.      Diet: 2 Gram Sodium Diet  Fluids: N/A  DVT Prophylaxis: Warfarin  Code Status: Full Code    Disposition Plan   Expected discharge:[RR1.1] 1-2 days[RR1.2], recommended to prior living arrangement once antibiotic plan established and mental status at baseline.     Entered: Shadia Vu 06/29/2018, 7:57 AM   Information in the above section will display in the discharge planner report.      The patient's care was discussed with the Attending Physician, Dr. Gutierrez.    Shadia Vu  Internal Medicine Staff Hospitalist Service  [facility]  Pager: [phone]  Please see sticky note for cross cover information    Interval History[RR1.1]   Ever is doing well this morning. Slept well overnight. Remained afebrile over past 24 hours. No SOB or cough. Eating and drinking well. No further episodes of diarrhea. No chest pain or palpitations.[RR1.2]    Data reviewed today: I reviewed all medications, new labs and imaging results over the last 24 hours. I personally reviewed[RR1.1] no images or EKG's today[RR1.2].    Physical Exam   Vital Signs: Temp: 98.2  F (36.8  C) Temp src: Oral BP: 107/67 Pulse: 70 Heart Rate: 72 Resp: 18 SpO2: 100 % O2 Device: None (Room air)    Weight: 195 lbs 0 oz  General Appearance: Slightly disheveled elderly  male resting comfortably in bed, NAD.  Respiratory: Respiratory effort normal on RA. Lungs CTAB without rales, rhonchi, or wheezing.  Cardiovascular: RRR, S1/S2. Grade 3/6 RUTH.  GI: Abdomen obese, soft, non-distended, non-tender. Negative Perry's. Bowel sounds normoactive.  Skin:[RR1.1] Stable b[RR1.2]ilateral erythematous/violaceous discoloration of legs extending from ankle up to mid-calf, R>L. Outlined in marker.  Extremities: Partial amputation of left foot. 2+ pitting edema[RR1.1] in  RLE[RR1.2].     Data   Data     Recent Labs  Lab 06/29/18  0624 06/28/18  0624 06/27/18 2045 06/27/18  0618  06/26/18  0339   WBC 7.4 9.2 11.3* 12.2*  < > 23.4*   HGB 10.5* 10.5* 10.6* 10.2*  < > 12.1*   MCV 84 85 87 87  < > 87   PLT 95* 89* 93* 89*  < > 102*   INR 1.55* 1.72*  --  2.06*  --  2.57*    136 136 135  --  139   POTASSIUM 3.7 3.9 5.0 4.3  --  4.8   CHLORIDE 107 104 105 106  --  107   CO2 22 21 22 19*  --  19*   BUN 25 22 21 24  --  44*   CR 1.34* 1.13 1.21 1.17  --  1.28*   ANIONGAP 11 11 9 9  --  13   MELODY 8.7 8.6 8.7 8.5  --  8.3*   * 170* 168* 152*  --  150*   ALBUMIN 2.8*  --  2.7*  --   --  3.4   PROTTOTAL 7.5  --  7.3  --   --  7.7   BILITOTAL 1.0  --  1.4*  --   --  1.1   ALKPHOS 73  --  74  --   --  88   ALT 25  --  16  --   --  17   AST 24  --  26  --   --  13   TROPI  --   --   --   --   --  <0.015   < > = values in this interval not displayed.[RR1.1]         Revision History        User Key Date/Time User Provider Type Action    > RR1.3 6/29/2018 12:58 PM Shadia Craft PA-C Physician Assistant Sign     RR1.2 6/29/2018 12:00 PM Shadia Craft PA-C Physician Assistant      RR1.1 6/29/2018  7:57 AM Shadia Craft PA-C Physician Assistant             Progress Notes by Caitlyn Donahue MSW at 6/28/2018 10:00 AM     Author:  Hartenberg, Caitlyn I, MSW Service:  Social Work Author Type:      Filed:  6/29/2018  8:17 AM Date of Service:  6/28/2018 10:00 AM Creation Time:  6/29/2018  8:15 AM    Status:  Addendum :  Caitlyn Donahue MSW ()         Social Work Services Progress Note    Hospital Day: 2 on obs  Date of Initial Social Work Evaluation:  Not Completed   Collaborated with:  Snehal Millan ([place] [phone] f: [phone]),      Data:  Pt is a 73 year old male being followed by SW for return to LTC.    Intervention:  SW asked to cancel pt's ride today as he has fevers and needs to be treated for an  additional day inpatient.  Will be available tomorrow to reschedule ride when medically stabilized.    Assessment:  Did not meet with pt for this encounter note.    Plan:    Anticipated Disposition:  Facility:  Return to [facility]    Barriers to d/c plan: Fever, medical stability    Follow Up:  SW to follow for discharge planning.    SUKI Luna, APSW  [address]   Phone: [phone]  Pager: [phone]  Unit: [phone][LH1.1]           Revision History        User Key Date/Time User Provider Type Action    > [N/A] 6/29/2018  8:17 AM Caitlyn Donahue MSW  Addend     LH1.1 6/29/2018  8:17 AM Caitlyn Donahue MSW  Sign            Progress Notes by Shadia Craft PA-C at 6/28/2018  7:38 AM     Author:  Shadia Craft PA-C Service:  Hospitalist Author Type:  Physician Assistant    Filed:  6/28/2018  3:43 PM Date of Service:  6/28/2018  7:38 AM Creation Time:  6/28/2018  7:38 AM    Status:  Attested :  Shadia Craft PA-C (Physician Assistant)    Cosigner:  Maurizio Gutierrez MD at 6/28/2018  4:04 PM        Attestation signed by Maurizio Gutierrez MD at 6/28/2018  4:04 PM        Attestation:  IMaurizio, saw and evaluated Ever Crane as part of a shared visit.  I have reviewed and discussed with the advanced practice provider their history, physical and plan.      I have reviewed today's care team notes, Medications, Vital Signs, Labs and imaging.     My key history or physical exam findings:   Transferred from ED obs for continued fevers. Presented with AMS but this has improved. Today has no complaints and feels back to his baseline.      Physical exam:  General: Alert and oriented, well nourished, in NAD  Chest/Pulmonary: breathing comfortably, no tachypnea   Cardiovascular: S1, S2 normal, regular rate and rhythm and no murmur, no JVD. Distal pulses 2+. Chronic venous stasis changes  Abdomen:  NABS, soft, non-tender, non-distended, no guarding, no rebound, no masses palpable and no hepatomegaly  Skin: no diaphoresis, skin color normal     Key management decisions made by me:   Unclear etiology of AMS but seems to have cleared.   Continue vanco and cefepime  EEG pending  Cardiology consulted for possible heart failure   Steve Gutierrez MD   of Medicine  Med-Peds Hospitalist  Pager [phone]     Date of service: 06/28/18                               [facility], [place]    Internal Medicine Progress Note - Gold Service[RR1.1]      Assessment & Plan[RR1.2]   Ever Crane is a 73 year old male admitted on 6/25/2018. He[RR1.1] has a history of[RR1.3] HTN, HLD, CAD s/p CABG (3/2000), HFpEF, paroxysmal atrial fibrillation[RR1.4]/atrial flutter[RR1.5], DM type II c/b peripheral angiopathy[RR1.4],[RR1.5] chronic LE ulcers[RR1.4] and[RR1.5] and left foot gangrene s/p left transmetatarsal amputation (11/2017)[RR1.4] and[RR1.3] was initially found driving erratically and was unresponsive in his car. Presented to the ED where he was noted to be febrile and tachypneic, felt possibly related to CAP and admitted to ED observation for antibiotics. Initially improved although began spiking fevers again and was transferred to medicine 6/27 for ongoing management.[RR1.5]    Fevers, Leukocytosis[RR1.3] Suspected 2/2[RR1.5] CAP.[RR1.3] Found altered in car, noted to be tachypneic and febrile (temp 102.2 F) in ED. Work-up revealed WBC 20.9, LA 1.4, VBG with mild hypocapnia (pH 7.39, pCO2 of 33, HCO3 of 20), CXR with small bilateral pleural effusions and adjacent basilar atelectasis and/or minor consolidation; also noted mild perihilar and mixed interstitial and airspace opacities likely representing pulmonary edema. Started on vancomycin/zosyn for suspected CAP with subsequent improvement in symptoms. Transitioned to levofloxacin 6/26 with plans to discharge on PO although spiked a  fever up to 102.7 F and was transferred to medicine for further evaluation. Broadened to vancomycin and cefepime on 6/27. T max of 102.4 F over past 24 hours[RR1.5], WBC normalized at[RR1.3] 9.2 (11.3), procal 0.37 (0.33),  (150)[RR1.5], patient breathing comfortably on RA.[RR1.3] BCx remain NGTD. Denies cough, SOB.[RR1.5]  - Continue IV cefepime, vancomycin for now[RR1.3]  - Follow BCx[RR1.5]  - Sputum cultures ordered[RR1.3]  - Aggressive IS  - Monitor fever curve  - PRN tylenol  - Trend CBC, CRP, procal[RR1.5]    AMS[RR1.3] (improved)[RR1.5].[RR1.3] Presented after being found driving erratically and was minimally[RR1.5] re[RR1.3]sponsive. Noted to be tachypneic and febrile in ED. Work-up with CLAIRE 0.0, U tox negative, , elevated WBC of 20.9, VBG with pH 7.39/pCO2 33/HCO3 20, bland UA, CT head negative, CXR with small bilateral pleural effusions and adjacent basilar atelectasis and/or consolidation. Started on vancomycin/zosyn for possible CAP with subsequent improvement in mentation. Highest suspicion for infection as etiology of AMS, other possibilities include seizure activity, ingestion, other CNS source. Started on high-dose thiamine 6/28 x 3 days. Currently A&O x 4,[RR1.5] vaguely recalls events leading up to admission.[RR1.3] Of note patient has had 's license revoked in the past due to erratic driving.   - Continue IV thiamine 500 mg q8h x 3 days[RR1.5]  - EEG monitoring[RR1.3]  - PT/OT consulted  - Fall precautions  - Consider psychiatric consult as patient reportedly has history of hoarding and bizarre behavior  - Unclear what patient's baseline mentation is; attempted to call patient's sister Miriam although no response on home or cell phone. Will re-attempt tomorrow.[RR1.5]    Mild Hyperbilirubinemia.[RR1.3] T bili mildly elevated at 1.4 with direct bilirubin of 0.3. Abdominal XR 6/27 revealed 2.5 cm calcification in right abdominal likely representing a gallstone. In setting of  fevers, obtained abdominal US to further evaluate for cholecystitis. US revealed hepatomegaly and cholelithiasis with no associated findings of acute cholecystitis.[RR1.5]     HFpEF with Acute Exacerbation. Recently admitted to OSH 1/2018 with HF exacerbation (BNP[RR1.3] 492[RR1.5] at that time[RR1.3] - normal <70[RR1.5]). Maintained on metoprolol, lisinopril, lasix, and spironolactone PTA.[RR1.3] EDW ~196-199 lbs.[RR1.5] BNP elevated at ~2000 on admission; echo revealed normal LV function, EF 55-60%, normal to mildly reduced RVF, mild pulmonary HTN.[RR1.3] Lasix and spironolactone held on admission due to concerns for sepsis, also received 3 + liters of IVF. Administered dose of[RR1.5] IV lasix 6/28 although BNP continues to up-trend at 4872 (4514).[RR1.3] 2+ pitting edema in lower extremities on exam, patient denies SOB or PND. Cardiology consulted for assistance.  - Continue metoprolol 50 mg BID  - Gentle diuresis with lasix 40 mg IV QD for now  - Resume lisinopril 10 mg QD and spironolactone 25 mg QD[RR1.5]  - Cardiology consulted, appreciate assistance  - I&O, daily weights[RR1.3]  - D-dimer ordered per cards[RR1.5]    Paroxysmal Atrial Fibrillation[RR1.3], Atrial Flutter[RR1.5]. S/p cardioversion in 2011. On metoprolol per above for rate control/HF. CHADSVASc[RR1.3] score of 5[RR1.5], maintained on warfarin for anticoagulation.[RR1.3] Initially in sinus tachycardia on admission, repeat EKG this AM revealed atrial flutter; patient asymptomatic.[RR1.5] INR slightly subtherapeutic at[RR1.3] 1.72[RR1.5].  - Continue metoprolol per above  - Warfarin dosing per pharmacy  - Daily INR    CAD. S/p CABG in 2000 with SVG to OM1 and PDA.[RR1.3] Maintained on simvastatin PTA.  - Continue simvastatin 40 mg QHS[RR1.5]    CKD Stage III.[RR1.3] Cr 1.33 on admission, recent BL ~1.3-1.4. UA bland on admission. Subsequently improving after receiving 3+ liters of IVF, current Cr 1.13.  - I&O, daily weights  - Avoid nephrotoxic  agents  - Trend BMP[RR1.5]    DM Type II. HgbA1C 7.7% on[RR1.3] 6/2018[RR1.5]. Maintained on glipizide and metformin PTA although held on admission. Currently on sliding scale, BG[RR1.3] 120-170's[RR1.5] over past 24 hours.  - Continue holding PTA metformin and glipizide  - Medium SSI  - Moderate CHO diet  - BG checks qAC, qHS  - Hypoglycemia protocol    LE Edema, Venous Insufficiency.[RR1.3] Chronic. Noted RLE swelling > left on admission. RLE US negative for DVT on 6/27. No open wounds on exam, prominent erythema/violaceous discoloration extending from ankles to just below knee. No active signs of infection at this time.  - Elevate legs when in bed  - Monitor for skin changes    Perineal[RR1.5] Candidiasis. Reportedly present[RR1.3] for several months, has had issues with medication compliance.[RR1.5]  - Continue[RR1.3] miconazole cream/powder BID[RR1.5]  - Monitor[RR1.3]    # Pain Assessment:[RR1.1]  Current Pain Score 6/28/2018   Patient currently in pain? denies   Pain location -   Pain descriptors -[RR1.2]   Ever rogers pain level was assessed and he currently denies pain.[RR1.3]      Diet:[RR1.1] 2 Gram Sodium Diet[RR1.2]  Fluids:[RR1.1] N/A[RR1.3]  DVT Prophylaxis:[RR1.1] Warfarin[RR1.3]  Code Status:[RR1.1] Full Code    Disposition Plan[RR1.2]   Expected discharge:[RR1.1] 2 - 3 days[RR1.3], recommended to[RR1.1] prior living arrangement[RR1.3] once[RR1.1] antibiotic plan established and mental status at baseline[RR1.3].     Entered:[RR1.1] Shadia Vu 06/28/2018[RR1.2],[RR1.1] 3:26 PM[RR1.2]   Information in the above section will display in the discharge planner report.      The patient's care was discussed with the Attending Physician, Dr. Gutierrez.[RR1.1]    Shadia Vu[RR1.2]  Internal Medicine Staff Hospitalist Service  [facility]  Pager: [phone]  Please see sticky note for cross cover information[RR1.1]    Interval History[RR1.2]   Ever is feeling okay this  "morning. Is frustrated with having his hair tied back for EEG monitoring. Able to state he was admitted after being found driving erratically although cannot recall why he was doing so. Endorses good appetite, no abdominal pain, nausea, or vomiting. Reports having an episode of diarrhea this morning. Notes his legs will intermittently become swollen and \"red\", have been so for the past several weeks. Otherwise denies fevers, chills, headache, dizziness, chest pain, SOB, cough, dysuria, or constipation.[RR1.3]    Data reviewed today: I reviewed all medications, new labs and imaging results over the last 24 hours. I personally reviewed[RR1.1] the EKG tracing showing atrial flutter with 3:1 conduction[RR1.5].[RR1.1]    Physical Exam[RR1.2]   Vital Signs:[RR1.1] Temp: 98.8  F (37.1  C) Temp src: Oral BP: 125/79   Heart Rate: 96 Resp: 20 SpO2: 94 % O2 Device: None (Room air)[RR1.2]    Weight:[RR1.1] 195 lbs 0 oz[RR1.2]  General Appearance:[RR1.1] Slightly disheveled elderly  male resting comfortably in bed, NAD.[RR1.3]  Respiratory:[RR1.1] Respiratory effort normal on RA. Lungs CTAB without rales, rhonchi, or wheezing.[RR1.3]  Cardiovascular:[RR1.1] RRR[RR1.5], S1/S2. Grade 3/6 RUTH.[RR1.3]  GI:[RR1.1] Abdomen obese, soft, non-distended, non-tender. Negative Perry's. Bowel sounds normoactive.[RR1.3]  Skin:[RR1.1] Bilateral erythematous/violaceous discoloration of legs extending from ankle up to mid-calf, R>L. Outlined in marker. 2+ pitting edema bilaterally.  Extremities: Partial amputation of left foot. 2+ pitting edema bilaterally (R>L).[RR1.3]     Data   Data[RR1.2]     Recent Labs  Lab 06/28/18  0624 06/27/18 2045 06/27/18 0618  06/26/18  0339   WBC 9.2 11.3* 12.2*  < > 23.4*   HGB 10.5* 10.6* 10.2*  < > 12.1*   MCV 85 87 87  < > 87   PLT 89* 93* 89*  < > 102*   INR 1.72*  --  2.06*  --  2.57*    136 135  --  139   POTASSIUM 3.9 5.0 4.3  --  4.8   CHLORIDE 104 105 106  --  107   CO2 21 22 19*  --  " 19*   BUN 22 21 24  --  44*   CR 1.13 1.21 1.17  --  1.28*   ANIONGAP 11 9 9  --  13   MELODY 8.6 8.7 8.5  --  8.3*   * 168* 152*  --  150*   ALBUMIN  --  2.7*  --   --  3.4   PROTTOTAL  --  7.3  --   --  7.7   BILITOTAL  --  1.4*  --   --  1.1   ALKPHOS  --  74  --   --  88   ALT  --  16  --   --  17   AST  --  26  --   --  13   TROPI  --   --   --   --  <0.015   < > = values in this interval not displayed.[RR1.6]         Revision History        User Key Date/Time User Provider Type Action    > RR1.6 6/28/2018  3:43 PM Shadia Craft PA-C Physician Assistant Sign     RR1.2 6/28/2018  3:26 PM Shadia Craft PA-C Physician Assistant      RR1.5 6/28/2018  2:22 PM Shadia Craft PA-C Physician Assistant      RR1.4 6/28/2018 12:37 PM Shadia Craft PA-C Physician Assistant      RR1.3 6/28/2018 10:27 AM Shadia Craft PA-C Physician Assistant      RR1.1 6/28/2018  7:38 AM Shadia Craft PA-C Physician Assistant             Progress Notes by Paige Sue PA-C at 6/27/2018  9:37 PM     Author:  Paige Sue PA-C Service:  Hospitalist Author Type:  Physician Assistant    Filed:  6/28/2018  1:46 AM Date of Service:  6/27/2018  9:37 PM Creation Time:  6/27/2018  9:37 PM    Status:  Attested :  Paige Sue PA-C (Physician Assistant)    Cosigner:  Sonu Zarco MD at 6/28/2018  3:52 PM        Attestation signed by Sonu Zarco MD at 6/28/2018  3:52 PM        Attestation:  Physician Attestation   I, Sonu Zarco, saw and evaluated Ever Crane as part of a shared visit.  I have reviewed and discussed with the advanced practice provider their history, physical and plan.    I personally reviewed the vital signs, medications, labs and imaging.    My key history or physical exam findings:   73 year old male with CAD post CABG, atrial fibrillation, DM2, PVD complicated by left foot gangrene post  transmetatarsal amputation who was admitted 6/25/18 with AMS from EMS. Patient was found to be driving erratically. He cannot really remember the events that brought him in. Since then he has been treated for pneumonia, initially with vanc zosyn and then switched to levofloxacin. He now continues to have fever with an up-trending curve. He has had some SOB as well, with minimally productive cough. Chronic rhinorrhea. Physical exam: Patient is sitting up, NAD. Tachypnic, speaking in short sentences, increased WOB with subcostal retractions, scattered crackles at bases. RRR, no m/r/g. JVD to jaw. Abdomen is large, not tense, soft, non-tender. 1+ pitting edema of LEs. Alert and oriented, tangential speech content.    Key management decisions made by me:   Transfer to inpatient  Escalate antibiotics to broad spectrum coverage. Was given vanc/cefepime which is reasonable, if not improving could consider adding anaerobic coverage.  Patient appears to be in mildly decompensated heart failure, would resume lasix.  Does not have any meningeal signs or symptoms (no headache, neck stiffness), given AMS that seems somewhat unusual (unclear at this time how much presentation is chronic vs acute), with intontinence on presentation reasonable to obtain EEG.    Sonu Zarco  Date of Service (when I saw the patient): 6/27/18                               [facility], [place]    Internal Medicine[MT1.1] ED Observation Transfer Acceptance Note[MT1.2]- Gold Service[MT1.1]      Assessment & Plan[MT1.3]   Ever Crane is a 73 year old male admitted on 6/25/2018. He has PMH of HTN, CAD s/p CABG (3/2000), HFpEF, HLD A.fib on coumadin, HTN,  DMII with diabetic peripheral angiopathy and left foot gangrene, chronic LE ulcers s/p left transmetatarsal amputation (11/2017),[MT1.1] and[MT1.4] polyneuropathy, awho presented to the ED via EMS with AMS. Patient noted to be driving car erratically, swerving back and  forth[MT1.1] when he was retrieved and transferred to the ED.[MT1.4] Initial w/u of AMS thought 2/2 CAP?. On presentation: K 5.7, Cr 1.33, , WBC 20.9 with left shift, BP stable, O2 sats stable on RA, though did require O2 while asleep ([MT1.1]4L[MT1.4]), VBG 7.39/33/30/20, LA 1.4, UA negative, UDS negative, Etoh[MT1.1] neg[MT1.4];  Head CT negative for acute process, CXR with small bilateral pleural effusions, adjacent basilar atelectasis and/or minor consolidation, mixed perihilar and mixed interstitial airspace opacities favored to represent pulmonary edema, cardiomegaly; CRP 50, Procal 0.48,  INR 2.42, Received 3L IVF, tylennol, narcan, Vanc/Zosyn. Patient noted to be somnolent on exam, though answering questions appropriately. Patient was transitioned to levaquin, though with elevated LA to 2.2, tachypnea to the 30's[MT1.1],and[MT1.4] persistent fever with tmax to 102.7, prompting transfer to inpatient Medicine for further evaluation and care.       # AMS[MT1.1], presumed pulmonary infection vs other[MT1.5]:Presented after being found[MT1.1] driving erratically and found to be minimally responsive in his car. CLAIRE of 0, . EMS reports patient became progressively confused and more lethargic with urinary incontinence. No reports of recent medication changes, ingestion, hypoglycemia, trauma or falls, heavy Etoh use (1 beer weekly per report), seizure hx, CP, fever, chills PTA.[MT1.5] Initial w/u as above with  CAP Vanc/Zosyn --> Levaquin --> Vanc/cefepime-2200- 6/27/18[MT1.1] as patient with intermittent fever, tachy[MT1.5]pnea (though in setting of high fever[MT1.1] and was with exertion during ambulation to bathroom[MT1.5]), Tachycardic to 102, CRP elevation to 150 (50), LA 1.4 (2.2), NTBNP 4514 (2005- 6/26/18, 454-1/2018), WBC 11.3 (15.3), Cr 1.21 (1.17)[MT1.1], TSH wnl[MT1.5]. H[MT1.1]a[MT1.5]s not required O2 in past 24 hours.[MT1.1] Repeat CXR with stable bilateral interstitial opacities- pulmonary  "edema vs infection. Unclear etiology, though consider infection (bacterial vs viral vs fungal) vs seizure activity vs other CNS. As patient initially improved with Vanc and zosyn, though was transitioned to Levaquin with deterioration in clinical status, it is likely that pulmonary infection is high on the differential. Does report a dry cough for the past week. No fever, chills PTA. Will further evaluate with the plan as outlined below.[MT1.5]     - Continue Vanc and cefepime[MT1.1] for now  - Trend fever, WBC, CRP  - Add AXR   - Consider CNS source if patient not improving[MT1.5]    - Add[MT1.1] ESR, treponema Abs with reflex to RPR and titer, Fungal cultures[MT1.5]   - PT[MT1.1]   - OT for cognitive evaluation  - High dose thiamine   - Will order EEG with hx of AMS, urinary incontinence  - Patient has had Drivers License revoked in the past d/t erratic driving. Per chart review, Police attempted to pull patient over, though patient drove for 7 miles before pulling over. Patient reports that he had a Medical Provider \"clear him\" for DL and he is fearful that he will lose his DL after this event. His truck is impounded.  - Fall precautions  - Also consider Psychiatry consult as patients with reports of hoarding, and bizarre behavior at care facility. Additionally, could obtain collateral information from family (appears patient was living with son 11/2017, though no number in chart). Did not obtain tonight d/t that late hour. It would be ideal to obtain background hx and what patients BL mental status is.   - Patient is currently residing at [facility] (contact:  [facility], [phone])[MT1.5]         # CAD, HFpEF[MT1.1] with acute HF exacerbation[MT1.5]: s/p CABG in 2000- SVG to OM1, SVG to PDA. ECHO on admission: Normal LV function, EF 55-60%, mild pulmonary HTN, dilated IVC with normal respiratory variation. EKG with sinus tachycardia with PVC's. PTA lasix, metoprolol, aldactone, Zocor, lisinopril. NTBNP " 4[MT1.1]514, 2005[MT1.5] (454, 1/2018[MT1.1]- was admitted to OSH for CHF exacerbation at this time[MT1.5]), Weight at recent Cardiology clinic visit 6/18/18 191lbs; 194 on admission.[MT1.1] + JVD, Orthopnea, +/- PND. Appears patient with heart failure exacerbation. Of note, patient with admission to OSH 1/28/18 for sob and treated for HF exacerbation- NTBNP at that time was 484.    - PRN O2 to maintain sats greater than 90%  - Low Na diet  - Resume lasix- will give 40 mg IV tonight. Please assess response to in am and re-dose accordingly. Did not order PTA PO dosing  - Hold aldactone for now  - Cardiology consult placed. Please discuss with in am  - Daily weights  - No IVF  - Continue PTA metoprolol with hold parameters  - Hold lisinopril     # Bilateral groin rash: Present for ~ 2 months. Reports fungal rash. Topical and PO tx without improvement.   - Topical powder/cream for now  - Further eval as necessary[MT1.5]    # LE edema: Chronic.[MT1.1] On admission, Right LE greater than left on admission. Negative for DVT on RLE US (6/27/18)  - Elevate LE  - Resume diuretics[MT1.5]      # Paroxysmal A.fib: S/p cardioversion (8/2011). PTA warfarin. INR 2.06.[MT1.1] EKG on admission with sinus tachycardia with PSVT and PVC's. Will repeat EKG.  - Repeat EKG[MT1.5]  - Pharmacy to dose coumadin[MT1.1]    # CKD III: Cr[MT1.6] 1.33,[MT1.5] BUN[MT1.6] 48[MT1.5] on admission.[MT1.6] Cr trending down to 1.17 --> 1.21, today. BL appears in the 1.2-1.5 range since 11/2017, though previously Cr was in the 0.80 range.   - Continue to monitor  - Avoid dehydration, hypotension, nephrotoxic medications  - BMP in am[MT1.5]     # DMII: A1c 7.7. BG mildly elevated 150's-160's on admission. PTA glipizide, metformin. Patient placed on MSSI[MT1.1] upon admission[MT1.5].[MT1.1]   - Continue to hold PO antiglycemic agents[MT1.5]  - Continue PTA MSSI[MT1.1] TID with meals and qhs[MT1.5]  - Hypoglycemia protocol  - MOD CHO diet    # Chronic LE  "ulcers[MT1.1], Venous insufficiency[MT1.5]: S/p Left transmetatarsal amputation (11/2017).[MT1.1] Does not appear to have any open areas of ulceration on examination. Is with erythematous/violaceous LE from toe to knee. Right with predominance of erythema and more swelling. Does not appear cellulitic at this time.   - Monitor[MT1.5]     # Pain Assessment:[MT1.1]  Current Pain Score 6/27/2018   Patient currently in pain? denies   Pain location -   Pain descriptors -[MT1.3]   Ever rogers pain level was assessed and he currently denies pain.[MT1.5]      Diet:[MT1.1] Moderate Consistent CHO Diet[MT1.3]  Fluids:[MT1.1] No IVF[MT1.5]  DVT Prophylaxis: Warfarin  Code Status:[MT1.1] Full Code    Disposition Plan[MT1.3]   Expected discharge: 2 - 3 days, recommended to prior living arrangement once Medically stable.     [facility]MT1.1[facility]MT1.3],[MT1.1] 9:38 PM[MT1.3]   Information in the above section will display in the discharge planner report.      The patient's care was discussed with the Attending Physician, Dr. Zarco.      Please see sticky note for cross cover information[MT1.1]    Interval History[MT1.3]   Patient noted to have increasing dyspnea when he ambulated to the bathroom this evening. He as not been out of bed today and is reported to be very weak. Evening RN reports patient with prolonged time spent in bathroom this evening in attempt to pass BM, then noted patient stood at sink washing hand for an excessive time. Upon examination, patient alert and oriented x3, though with slowed speech, poor memory, impaired attention. Breathing appears labored, though patient denies dyspnea. No O2 use. + JVD and crackles at bilateral base. Patient does not recall the events that led up to his transfer to the hospital, but recalls that he was at a farm in [place], [place], planting raspberry bushes on the morning of 6/25/18 and reports he was  in \"good health\". He then recalls getting in his truck " "that evening with plan to drive back to Assisted living/TCU in [place], [place]. He initially reports any recollection of events thereafter, but later in conversation he reports that he was driving erratically, though unable to elaborate. Unclear if patient repeating hx that was discussed from previous \"erratic driving\" event, or if true recollection. Patient does not want information regarding this \"erratic driving\" event getting back to his facility d/t the likelihood of DL revocation. He reports bothersome groin rash that for the past 2 months that he has treated with creams, powders, and pills. No pruritis or pain. Also noted to have dry cough in the past week. No fever, chills, CP, palpitations, miranda, PND, or worsening orthopnea. Penile burning, though not associated with urination. No report of unprotected/protected intercourse.   Patient denies other acute concerns other than those listed above.  We discussed plan as outlined above and he is agreeable.       Patient does not endorse: headaches, changes in vision, chest pain, palpitations, upper respiratory symptoms of rhinorrhea or congestion, shortness of breath,  sputum production, wheezing, abdominal pain, nausea, emesis, constipation, diarrhea, dysuria, edema, rashes, weakness, focal neurologic deficits, recent travel, illness, fever, chills.[MT1.5]        Data reviewed today: I reviewed all medications, new labs and imaging results over the last 24 hours. I personally reviewed[MT1.1] recent imaging studies, daily labs and progress notes. Also reviewed Care everywhere records.[MT1.5]     Physical Exam[MT1.3]   Vital Signs:[MT1.1] Temp: 102.4  F (39.1  C) Temp src: Oral BP: 132/69 Pulse: 85 Heart Rate: 99 Resp: 30 SpO2: 92 % O2 Device: None (Room air)[MT1.3]    Weight:[MT1.1] 195 lbs 0 oz[MT1.3]    Physical Exam   Constitutional:[MT1.1] Elderly male sitting up in bed. Conversant, though poor memory, slowed speech, and inattention.[MT1.5]   Well " nourished, well developed, resting comfortably   HEENT:   Head: Normocephalic and atraumatic.   Eyes: Conjunctivae are normal. Pupils are equal, round, and reactive to light.  Pharynx has no erythema or exudate, mucous membranes are moist  Neck:   No adenopathy, no bony tenderness  Cardiovascular: Regular rate and rhythm without murmurs or gallops[MT1.1]. + JVD.[MT1.5]  Pulmonary/Chest:[MT1.1] Bilateral crackles in base. Difficulty with full sentences.   GI: Distended, though soft[MT1.5] Soft with good bowel sounds.  Non-tender, non-distended, with no guarding, no rebound, no peritoneal signs.   Back:  No bony or CVA tenderness   Musculoskeletal:  No edema or clubbing   Skin:[MT1.1] LLE with previous amputation site w/o signs of infection. Healed and no erythema. RLE with sequale of CVI- erythema/violaceous mild swelling. Pulses intact. Non-tender.[MT1.5]   Neurological: Alert and oriented to person, place, and time. Nonfocal exam  Psychiatric:[MT1.1]  Poor attention, memory deficits, slowed speech[MT1.5]            Data   Medications     Warfarin Therapy Reminder         ceFEPIme (MAXIPIME) IV  2 g Intravenous Q8H     insulin aspart  1-7 Units Subcutaneous TID AC     insulin aspart  1-5 Units Subcutaneous At Bedtime     ipratropium - albuterol 0.5 mg/2.5 mg/3 mL  3 mL Nebulization Once     lisinopril  10 mg Oral Daily     metoprolol succinate  50 mg Oral BID     miconazole   Topical BID     miconazole   Topical Daily     omeprazole  20 mg Oral QAM AC     simvastatin  40 mg Oral At Bedtime     sodium chloride (PF)  10 mL Intravenous Once     sodium chloride (PF)  3 mL Intracatheter Q8H     sodium chloride (PF)  3 mL Intravenous Q8H[MT1.3]          Revision History        User Key Date/Time User Provider Type Action    > MT1.5 6/28/2018  1:46 AM Paige Sue PA-C Physician Assistant Sign     MT1.6 6/27/2018 10:51 PM Paige Sue PA-C Physician Assistant      MT1.4 6/27/2018 10:30 PM Paige Sue  MARCELA Forrest Physician Assistant      MT1.2 6/27/2018 10:26 PM Paige Sue PA-C Physician Assistant      MT1.3 6/27/2018  9:38 PM Paige Sue PA-C Physician Assistant      MT1.1 6/27/2018  9:37 PM Paige Sue PA-C Physician Assistant             Progress Notes by Lety Duran at 6/28/2018  9:00 AM     Author:  Lety Duran Service:  (none) Author Type:  Technician    Filed:  6/28/2018 10:14 AM Encounter Date:  6/28/2018 Status:  Signed    :  Lety Duran (Technician)           IP VEEG [facility] CPT 63570[BF1.1]     Revision History        User Key Date/Time User Provider Type Action    > BF1.1 6/28/2018 10:14 AM Lety Duran Technician Sign            Progress Notes by Gisella Hernandez LICSW at 6/27/2018  9:14 PM     Author:  Gisella Hernandez LICSW Service:  Social Work Author Type:      Filed:  6/27/2018  9:21 PM Date of Service:  6/27/2018  9:14 PM Creation Time:  6/27/2018  9:14 PM    Status:  Addendum :  Gisella Hernandez LICSW ()         Social Work Services Progress Note    Hospital Day: 2 (Observation)  Date of Initial Social Work Evaluation:  Not Completed   Collaborated with:  Pt and Snehal Millan ([place] [phone][NC1.1] f: [phone][NC1.2]), Primary Provider     Data:  SW auto consult as pt identified as coming from [facility]. Writer contacted facility and confirmed that pt has been a resident at their facility and they are going to accept back. Pt was scheduled to move into an AL yesterday, but due to change in functional status pt can return to LTC and proceed to AL when moving around better. Pt wants to return to facility     Intervention:  D/C Planning     Assessment:  Pt is receptive to  visit and actively engaged in d/c planning. Pt reports he want to return to his LTC facility before moving into his AL.     Plan:    Anticipated Disposition:  Facility:  Return to [facility]  tomorrow. HE w/c ride scheduled for 2pm.     Barriers to d/c plan:  None anticipated     Follow Up:   to confirm d/c time w/ Cereemilianayunior-Yana tomorrow. No PAS needed.[NC1.1]          Revision History        User Key Date/Time User Provider Type Action    > NC1.2 6/27/2018  9:21 PM Gisella Hernandez LICSW  Addend     NC1.1 6/27/2018  9:19 PM Gisella Hernandez LICSW  Sign            Progress Notes by Juju Godinez CNP at 6/27/2018  4:42 PM     Author:  Juju Godinez CNP Service:  Observation Author Type:  Nurse Practitioner    Filed:  6/27/2018  7:32 PM Date of Service:  6/27/2018  4:42 PM Creation Time:  6/27/2018  4:42 PM    Status:  Signed :  Juju Godinez CNP (Nurse Practitioner)         [facility]  Daily Progress Note          Assessment & Plan:   Ever Crane is a 73 year old male with a history of HTN, HLD, CAD, s/p CABG x 2 who presented to the ED via EMS with altered mental status.      1. Altered Mental Status  2. CAP   Patient was brought in by EMS to ED while found driving car erratically, swerving back and forth, when he was retrieved. Blood alcohol content of 0, glucose of 130. En route was reported to be confused, incontinent and lethargic states he has no pain. He admits to chills for the last 2 days, cough, generalized weakness. BLE erythema not new. Denies any fever, nausea, abdominal pain, diarrhea, constipation, chest pain, SOB, palpitations, headache, changes in vision. In ED, HR 90's-110's, BP /50-84, RR 11-27, SaO2 % on RA-4LNC (placed on O2 while asleep), Temp 102.2. Labs show CMP with K 5.7, Bicarb 19, BUN/Creat 48/1.33, glucose 179. CBC with WBC of 20.9, H/H 13.1/39.4, platelet 126, abs neutrophil 19.1. INR 2.42. VBG with pH 7.39, CO2 33, O2 30. Lactic acid 1.4.UA with small blood otherwise normal. Urine culture and blood culture sent and pending. UDS negative. Head CT negative for  "any acute process. CXR shows small bilateral pleural effusions and adjacent basilar atelectasis and a minor consolidation; mild perihilar and mixed interstitial and airspace opacities may represent pulmonary edema; cardiomegaly. In the ED the patient was given 3L NS bolus, tylenol 650mg supp x 1, narcan 2mg IV x 1, zosyn 4.5gm IV x 1, vancomycin 2250mg IV x 1. He is sleepy on exam but arousable and answers some questions appropriately but brief. Faint fine crackles on exam. Irregular heart rhythm. No focal neuro deficit. DDx: Pneumonia, sepsis. Today patient is alert and oriented. Lactic acid 2.2.  Initially given Vanco and Zosyn, but now transitioned to Levaquin. Low grade fever today. WBC 12.2, down from 15.3.  Blood cultures with no growth to date. Urine culture negative.   - Continue Levaquin 750mg IV daily  - Tylenol 1gm po q6h prn fever/pain  - Hold Lasix and Spironolactone  - telemetry  - Strict I/O  - Daily weights  - repeat BMP, CBC in am  -PT consult done.       2. DM2: A1C 7.7.  On Glipizide 10mg qam and 15mg qpm.  today.    - Hold Glipizide for now  - SSI with Novolog medium resistance  - BG checks TID ac and Qhs  - Carbohydrate Consistent Diet  - Hypoglycemic protocol      3. CAD s/p CABG:   - Continue Lisinopril, Zocor  - Hold Lasix and Spironolactone   -resume metoprolol     4. Afib: INR 2.57 today.   - Pharmacy consult to dose coumadin  - INR   -resume metoprolol      5. Left Foot Stump Erythema: - stump is clean, dry and intact.  No drainage or erythema.        6. Left Groin Candidiasis: - Miconazole cream and powder.         FEN: moderate CHO diet  Lines: PIV   Prophylaxis: coumadin          Consults:[AH1.1]   PT/OT  SW[AH1.2]         Discharge Planning:[AH1.1]   Discharge back to Skilled Nursing Facility at 2 pm tomorrow[AH1.2]        Interval History:   Chief complaint of \"feels a little wheezy\" when he tries to move around. Alert, sitting on side of bed and oriented x3.      ROS: "   Constitutional: No fevers/chills. Tolerating diet.   Cardiovascular: No chest pain or palpitations.   Respiratory: +reports SOB[AH1.1] with activity[AH1.2]. No cough.   GI: No abdominal pain. No N/V.      : No urinary complaints.   Musculoskeletal: Denies pain.           Physical Exam:   /73 (BP Location: Left arm)  Pulse 85  Temp 99.8  F (37.7  C) (Oral)  Resp 17  Ht 1.829 m (6')  Wt 88.5 kg (195 lb)  SpO2 95%  BMI 26.45 kg/m2     GENERAL: Alert and oriented x 3. NAD.   CV: RRR. S1, S2. No murmurs appreciated.   RESPIRATORY: Effort normal. Lungs CTAB with no wheezing, rales, rhonchi.   GI: Abdomen soft and non distended with normoactive bowel sounds present in all quadrants. No tenderness, rebound, guarding.   NEUROLOGICAL: No focal deficits. Moves all extremities.    EXTREMITIES: No peripheral edema. Intact bilateral pedal pulses.   SKIN: No jaundice. No rashes.     Medication list reviewed.   Today's labs and imaging were reviewed.     KEVIN Polk CNP  Emergency Department Observation Unit[AH1.1]         Revision History        User Key Date/Time User Provider Type Action    > AH1.2 6/27/2018  7:32 PM Juju Godinez CNP Nurse Practitioner Sign     AH1.1 6/27/2018  4:42 PM Juju Godinez CNP Nurse Practitioner             Progress Notes by Char Teague PT at 6/27/2018 10:22 AM     Author:  Char Teague PT Service:  (none) Author Type:  Physical Therapist    Filed:  6/27/2018 11:11 AM Date of Service:  6/27/2018 10:22 AM Creation Time:  6/27/2018 11:10 AM    Status:  Signed :  Char Teague PT (Physical Therapist)          06/27/18 0910   Quick Adds   Type of Visit Initial PT Evaluation   Living Environment   Lives With facility resident   Living Arrangements (Long term care, now transitioning to [facility])   Home Accessibility no concerns   Living Environment Comment Pt reports he drives at baseline, has been at LTC after surgery last november, was going to  "transition to halfway but now admitted to hospital and between residences    Self-Care   Usual Activity Tolerance good   Current Activity Tolerance moderate   Regular Exercise no   Equipment Currently Used at Home none   Activity/Exercise/Self-Care Comment pt reports being IND at baseline    Functional Level Prior   Ambulation 0-->independent   Transferring 0-->independent   Toileting 0-->independent   Bathing 0-->independent   Dressing 0-->independent   Eating 0-->independent   Fall history within last six months no   Which of the above functional risks had a recent onset or change? ambulation;transferring   Prior Functional Level Comment pt reports 1 fall about 6 months ago, none recently    General Information   Onset of Illness/Injury or Date of Surgery - Date 06/25/18   Referring Physician Char Ross, KEVIN CNP   Patient/Family Goals Statement to feel better   Pertinent History of Current Problem (include personal factors and/or comorbidities that impact the POC) 73 year old male with a history of HTN, HLD, CAD, s/p CABG x 2 who presented to the ED via EMS with altered mental status. Per ED Note: \"History is extraordinarily limited at this time secondary to the patient's altered mental status.  EMS reported that the patient was found driving his car erratically, swerving back and forth, when he was retrieved. He was found with blood alcohol content of 0, glucose of 130. En route, they report that the patient became increasingly more confused and lethargic. He also did urinate on himself en route. The patient here states that he is feeling \"pretty good\". He states he has no pain. He denies history of seizure, heart problem, or diabetes. He denies use of any recreational drugs. Again, these admissions are in the setting of marked altered mental status and are thus very limited in interpretation. Additional history is obtained subsequently from nursing home at the patient's rehab facility, [facility]" "([facility], [phone]): PMH of diabetes with prior left forefoot amputation, polyneuropathy, CHF, prior CABG, atrial fibrillation, anticoagulated on Coumadin, PVD, hypertension, hyperlipidemia, GERD, anticoagulation on Coumadin. Also reports patient is a full code. Reported that the patient is normally oriented and alert, so his current mental status is quite unusual.\"   Precautions/Limitations fall precautions   Heart Disease Risk Factors Gender;Age;Medical history;High blood pressure;Dislipidemia   General Observations pt supine in bed, alert    General Info Comments activity orders: ambulate with assist    Cognitive Status Examination   Orientation orientation to person, place and time   Level of Consciousness alert   Follows Commands and Answers Questions 100% of the time   Personal Safety and Judgment intact   Memory intact   Pain Assessment   Patient Currently in Pain No   Integumentary/Edema   Integumentary/Edema Comments redness on B lower legs   Posture    Posture Forward head position;Protracted shoulders   Range of Motion (ROM)   ROM Comment WFL    Strength   Strength Comments generalized weakness per mobility    Bed Mobility   Bed Mobility Comments min assist for supine to sit, heavy use of bed rails    Transfer Skills   Transfer Comments CGA for STS    Gait   Gait Comments 150ft without AD and CGA   Balance   Balance Comments needs CGA for standing balance    Sensory Examination   Sensory Perception no deficits were identified   General Therapy Interventions   Planned Therapy Interventions risk factor education;home program guidelines;progressive activity/exercise;transfer training;strengthening;gait training;balance training   Clinical Impression   Criteria for Skilled Therapeutic Intervention yes, treatment indicated   PT Diagnosis impaired mobility    Influenced by the following impairments impaired strength, balance, transfers    Functional limitations due to impairments impaired gait, stairs " "  Clinical Presentation Evolving/Changing   Clinical Presentation Rationale pt with complex presentation and discharge recommendation    Clinical Decision Making (Complexity) Moderate complexity   Therapy Frequency` 5 times/week   Predicted Duration of Therapy Intervention (days/wks) 1 week    Anticipated Discharge Disposition Long Term Care Facility;Other (see comments)  (assisted living )   Risk & Benefits of therapy have been explained Yes   Patient, Family & other staff in agreement with plan of care Yes   Clinical Impression Comments Pt with below baseline mobility, needs CGA-min assist for all mobility    [facility] TM \"6 Clicks\"   2016, Trustees of [facility], under license to [employer].  All rights reserved.   6 Clicks Short Forms Basic Mobility Inpatient Short Form   [facility] AM-PAC  \"6 Clicks\" V.2 Basic Mobility Inpatient Short Form   1. Turning from your back to your side while in a flat bed without using bedrails? 4 - None   2. Moving from lying on your back to sitting on the side of a flat bed without using bedrails? 3 - A Little   3. Moving to and from a bed to a chair (including a wheelchair)? 3 - A Little   4. Standing up from a chair using your arms (e.g., wheelchair, or bedside chair)? 3 - A Little   5. To walk in hospital room? 3 - A Little   6. Climbing 3-5 steps with a railing? 2 - A Lot   Basic Mobility Raw Score (Score out of 24.Lower scores equate to lower levels of function) 18   Total Evaluation Time   Total Evaluation Time (Minutes) 8[KD1.1]        Revision History        User Key Date/Time User Provider Type Action    > KD1.1 6/27/2018 11:11 AM Char Teague, PT Physical Therapist Sign                  Procedure Notes     No notes of this type exist for this encounter.         Progress Notes - Therapies (Notes from 06/27/18 through 06/30/18)      Progress Notes by Indira Disla OT at 6/30/2018  9:56 AM     Author:  Indira Disla OT Service:  " (none) Author Type:  Occupational Therapist    Filed:  6/30/2018  9:57 AM Date of Service:  6/30/2018  9:56 AM Creation Time:  6/30/2018  9:57 AM    Status:  Signed :  Indira Disla OT (Occupational Therapist)          06/30/18 0800   Quick Adds   Type of Visit Initial Occupational Therapy Evaluation   Living Environment   Lives With facility resident   Living Arrangements other (see comments)  ((Long term care [facility], planning to transition retirement))   Home Accessibility no concerns   Living Environment Comment Pt reports he drives at baseline, has been at LTC after surgery last november, was going to transition to retirement but now admitted to hospital and between residences    Self-Care   Dominant Hand right   Usual Activity Tolerance good   Current Activity Tolerance good   Regular Exercise no   Equipment Currently Used at Home none   Activity/Exercise/Self-Care Comment reports IND at baseline until 4 days ago   Functional Level Prior   Ambulation 0-->independent   Transferring 0-->independent   Toileting 0-->independent   Bathing 0-->independent   Dressing 0-->independent   Eating 0-->independent   Communication 0-->understands/communicates without difficulty   Swallowing 0-->swallows foods/liquids without difficulty   Cognition 0 - no cognition issues reported   Fall history within last six months no   Which of the above functional risks had a recent onset or change? cognition   Prior Functional Level Comment pt reports 1 fall about 6 months ago, none recently        Present no   General Information   Onset of Illness/Injury or Date of Surgery - Date 06/25/18   Referring Physician Paige Sue   Patient/Family Goals Statement stay at LTC   Additional Occupational Profile Info/Pertinent History of Current Problem Ever Crane is a 73 year old male admitted on 6/25/2018. 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 Presented to the ED where he was noted to be febrile and tachypneic, felt possibly related to CAP and admitted to ED observation for antibiotics. Initially improved although began spiking fevers again and was transferred to medicine 6/27 for ongoing management.   Precautions/Limitations fall precautions   General Observations amb with assist   General Info Comments pt reports he has had episode of \"erratic\" driving in past    Cognitive Status Examination   Orientation orientation to person, place and time   Level of Consciousness alert   Able to Follow Commands WNL/WFL   Personal Safety (Cognitive) moderate impairment   Memory intact   Attention No deficits were identified   Cognitive Comment insight deficits, safety concerns   Visual Perception   Visual Perception No deficits were identified   Sensory Examination   Sensory Quick Adds No deficits were identified   Pain Assessment   Patient Currently in Pain No   Integumentary/Edema   Integumentary/Edema other (describe)   Integumentary/Edema Comments L LE swelling and redness   Posture   Posture not impaired   Range of Motion (ROM)   ROM Quick Adds No deficits were identified   Strength   Manual Muscle Testing Quick Adds No deficits were identified   Coordination   Upper Extremity Coordination No deficits were identified   Transfer Skill: Bed to Chair/Chair to Bed   Level of [place]: Bed to Chair contact guard   Transfer Skill: Sit to Stand   Level of [place]: Sit/Stand independent   Transfer Skill: Toilet Transfer   Level of [place]: Toilet contact guard   Upper Body Dressing   Level of [place]: Dress Upper Body independent   Lower Body Dressing   Level of [place]: Dress Lower Body independent   Toileting   Level of [place]: Toilet " "stand-by assist   Grooming   Level of [place]: Grooming stand-by assist   Eating/Self Feeding   Level of [place]: Eating independent   Instrumental Activities of Daily Living (IADL)   Previous Responsibilities driving   Activities of Daily Living Analysis   Impairments Contributing to Impaired Activities of Daily Living balance impaired;cognition impaired   General Therapy Interventions   Planned Therapy Interventions ADL retraining;cognition;transfer training;progressive activity/exercise   Clinical Impression   Criteria for Skilled Therapeutic Interventions Met yes, treatment indicated   OT Diagnosis ADL deficits   Influenced by the following impairments cognition, balance   Assessment of Occupational Performance 1-3 Performance Deficits   Identified Performance Deficits driving, IADLs, home management   Clinical Decision Making (Complexity) Moderate complexity   Therapy Frequency 5 times/wk   Predicted Duration of Therapy Intervention (days/wks) 3 days   Anticipated Discharge Disposition Long Term Care Facility   Risks and Benefits of Treatment have been explained. Yes   Patient, Family & other staff in agreement with plan of care Yes   [facility] TM \"6 Clicks\"   2016, Trustees of [facility], under license to [employer].  All rights reserved.   6 Clicks Short Forms Daily Activity Inpatient Short Form   [facility]  \"6 Clicks\" Daily Activity Inpatient Short Form   1. Putting on and taking off regular lower body clothing? 4 - None   2. Bathing (including washing, rinsing, drying)? 3 - A Little   3. Toileting, which includes using toilet, bedpan or urinal? 4 - None   4. Putting on and taking off regular upper body clothing? 4 - None   5. Taking care of personal grooming such as brushing teeth? 4 - None   6. Eating meals? 4 - None   Daily Activity Raw Score (Score out of 24.Lower scores equate to lower levels of function) 23   Total Evaluation Time   Total Evaluation Time " (Minutes) 10[NA1.1]        Revision History        User Key Date/Time User Provider Type Action    > NA1.1 6/30/2018  9:57 AM Indira Disla OT Occupational Therapist Sign            Progress Notes by Char Teague PT at 6/27/2018 10:22 AM     Author:  Char Teague PT Service:  (none) Author Type:  Physical Therapist    Filed:  6/27/2018 11:11 AM Date of Service:  6/27/2018 10:22 AM Creation Time:  6/27/2018 11:10 AM    Status:  Signed :  Char Teague PT (Physical Therapist)          06/27/18 0910   Quick Adds   Type of Visit Initial PT Evaluation   Living Environment   Lives With facility resident   Living Arrangements (Long term care, now transitioning to [facility])   Home Accessibility no concerns   Living Environment Comment Pt reports he drives at baseline, has been at LT after surgery last november, was going to transition to [facility] but now admitted to hospital and between residences    Self-Care   Usual Activity Tolerance good   Current Activity Tolerance moderate   Regular Exercise no   Equipment Currently Used at Home none   Activity/Exercise/Self-Care Comment pt reports being IND at baseline    Functional Level Prior   Ambulation 0-->independent   Transferring 0-->independent   Toileting 0-->independent   Bathing 0-->independent   Dressing 0-->independent   Eating 0-->independent   Fall history within last six months no   Which of the above functional risks had a recent onset or change? ambulation;transferring   Prior Functional Level Comment pt reports 1 fall about 6 months ago, none recently    General Information   Onset of Illness/Injury or Date of Surgery - Date 06/25/18   Referring Physician Char Ross APRN CNP   Patient/Family Goals Statement to feel better   Pertinent History of Current Problem (include personal factors and/or comorbidities that impact the POC) 73 year old male with a history of HTN, HLD, CAD, s/p CABG x 2 who presented to the ED via EMS with altered mental  "status. Per ED Note: \"History is extraordinarily limited at this time secondary to the patient's altered mental status.  EMS reported that the patient was found driving his car erratically, swerving back and forth, when he was retrieved. He was found with blood alcohol content of 0, glucose of 130. En route, they report that the patient became increasingly more confused and lethargic. He also did urinate on himself en route. The patient here states that he is feeling \"pretty good\". He states he has no pain. He denies history of seizure, heart problem, or diabetes. He denies use of any recreational drugs. Again, these admissions are in the setting of marked altered mental status and are thus very limited in interpretation. Additional history is obtained subsequently from nursing home at the patient's rehab facility, [facility] ([facility], [phone]): PMH of diabetes with prior left forefoot amputation, polyneuropathy, CHF, prior CABG, atrial fibrillation, anticoagulated on Coumadin, PVD, hypertension, hyperlipidemia, GERD, anticoagulation on Coumadin. Also reports patient is a full code. Reported that the patient is normally oriented and alert, so his current mental status is quite unusual.\"   Precautions/Limitations fall precautions   Heart Disease Risk Factors Gender;Age;Medical history;High blood pressure;Dislipidemia   General Observations pt supine in bed, alert    General Info Comments activity orders: ambulate with assist    Cognitive Status Examination   Orientation orientation to person, place and time   Level of Consciousness alert   Follows Commands and Answers Questions 100% of the time   Personal Safety and Judgment intact   Memory intact   Pain Assessment   Patient Currently in Pain No   Integumentary/Edema   Integumentary/Edema Comments redness on B lower legs   Posture    Posture Forward head position;Protracted shoulders   Range of Motion (ROM)   ROM Comment WFL    Strength   Strength Comments " "generalized weakness per mobility    Bed Mobility   Bed Mobility Comments min assist for supine to sit, heavy use of bed rails    Transfer Skills   Transfer Comments CGA for STS    Gait   Gait Comments 150ft without AD and CGA   Balance   Balance Comments needs CGA for standing balance    Sensory Examination   Sensory Perception no deficits were identified   General Therapy Interventions   Planned Therapy Interventions risk factor education;home program guidelines;progressive activity/exercise;transfer training;strengthening;gait training;balance training   Clinical Impression   Criteria for Skilled Therapeutic Intervention yes, treatment indicated   PT Diagnosis impaired mobility    Influenced by the following impairments impaired strength, balance, transfers    Functional limitations due to impairments impaired gait, stairs   Clinical Presentation Evolving/Changing   Clinical Presentation Rationale pt with complex presentation and discharge recommendation    Clinical Decision Making (Complexity) Moderate complexity   Therapy Frequency` 5 times/week   Predicted Duration of Therapy Intervention (days/wks) 1 week    Anticipated Discharge Disposition Long Term Care Facility;Other (see comments)  (assisted living )   Risk & Benefits of therapy have been explained Yes   Patient, Family & other staff in agreement with plan of care Yes   Clinical Impression Comments Pt with below baseline mobility, needs CGA-min assist for all mobility    [facility] [employer] TM \"6 Clicks\"   2016, Trustees of [facility], under license to [employer].  All rights reserved.   6 Clicks Short Forms Basic Mobility Inpatient Short Form   [facility] AM-PAC  \"6 Clicks\" V.2 Basic Mobility Inpatient Short Form   1. Turning from your back to your side while in a flat bed without using bedrails? 4 - None   2. Moving from lying on your back to sitting on the side of a flat bed without using bedrails? 3 - A Little   3. Moving to and " from a bed to a chair (including a wheelchair)? 3 - A Little   4. Standing up from a chair using your arms (e.g., wheelchair, or bedside chair)? 3 - A Little   5. To walk in hospital room? 3 - A Little   6. Climbing 3-5 steps with a railing? 2 - A Lot   Basic Mobility Raw Score (Score out of 24.Lower scores equate to lower levels of function) 18   Total Evaluation Time   Total Evaluation Time (Minutes) 8[KD1.1]        Revision History        User Key Date/Time User Provider Type Action    > KD1.1 6/27/2018 11:11 AM Char Teague, PT Physical Therapist Sign

## 2018-06-25 NOTE — LETTER
Transition Communication Hand-off for Care Transitions to Next Level of Care Provider    Name: Ever Crane  : 1945  MRN #: [id number]  Primary Care Provider: Sonu Crawford     Primary Clinic: [address] [address]     Reason for Hospitalization:  Pneumonia due to infectious organism, unspecified laterality, unspecified part of lung [J18.9]  Admit Date/Time: 2018  7:48 PM  Discharge Date: 18  Payor Source: Payor: MEDICARE / Plan: MEDICARE / Product Type: Medicare /     Readmission Assessment Measure (ALEXANDRE) Risk Score/category: Average risk    Discharge Plan:  [facility]  PH: [phone]  F: [phone]     Concern for non-adherence with plan of care:   Y/N N  Discharge Needs Assessment:  Needs       Most Recent Value    Anticipated Changes Related to Illness none    Equipment Currently Used at Home none          No recommendations for today.  Thank you,    SUKI Luna, APSW  [address]   Phone: [phone]  Pager: [phone]  Unit: [phone]

## 2018-06-25 NOTE — IP AVS SNAPSHOT
` `     [facility]: [phone]                                              INTERAGENCY TRANSFER FORM - NURSING   2018                    Hospital Admission Date: 2018  RANCHO SPENCER   : 1945  Sex: Male        Attending Provider: Michelle Lauren MD     Allergies:  Latex, Penicillins    Infection:  MRSA   Service:  HOSPITALIST    Ht:  1.829 m (6')   Wt:  91.6 kg (201 lb 15.1 oz)   Admission Wt:  88.5 kg (195 lb)    BMI:  27.39 kg/m 2   BSA:  2.16 m 2            Patient PCP Information     Provider PCP Type    Provider Not In System General      Current Code Status     Date Active Code Status Order ID Comments User Context       Prior      Code Status History     Date Active Date Inactive Code Status Order ID Comments User Context    2018  8:16 AM  Full Code [id number]  Shadia Craft PA-C Outpatient    2018  3:09 AM 2018  8:16 AM Full Code [id number]  Jennifer Henderson PA Inpatient      Advance Directives        Scanned docmt in ACP Activity?           No scanned doc        Hospital Problems as of 2018              Priority Class Noted POA    Pneumonia Medium  2018 Yes    Fever Medium  2018 Yes      Non-Hospital Problems as of 2018     None      Immunizations     None         END      ASSESSMENT     Discharge Profile Flowsheet     DISCHARGE NEEDS ASSESSMENT     Full except areas not inspected   Hip, right;Hip, left;Buttock, left;Buttock, right;Sacrum;Coccyx 18 0925    Anticipated Changes Related to Illness  none 18   Inspection under devices  Full 18 0943    Equipment Currently Used at Home  none 18 0904   Skin WDL  ex 18 0943    GASTROINTESTINAL (ADULT,PEDIATRIC,OB)     Skin Temperature  warm 18 0943    GI WDL  WDL 18 0943   Skin Moisture  dry 18 0943    Last Bowel Movement  18 1029   Skin Elasticity  quick return to original state 18 0217    Passing flatus   "yes 06/29/18 1029   Skin Integrity  bruise(s);scab(s);rash(es) 06/30/18 0943    COMMUNICATION ASSESSMENT     SAFETY      Patient's communication style  spoken language (English or Bilingual) 06/27/18 2025   Safety WDL  WDL 06/30/18 0943    SKIN     All Alarms  alarm(s) activated and audible 06/30/18 0943    Inspection of bony prominences  Full 06/30/18 0943                      Assessment WDL (Within Defined Limits) Definitions           Safety WDL     Effective: 09/28/15    Row Information: <b>WDL Definition:</b> Bed in low position, wheels locked; call light in reach; upper side rails up x 2; ID band on<br> <font color=\"gray\"><i>Item=AS safety wdl>>List=AS safety wdl>>Version=F14</i></font>      Skin WDL     Effective: 09/28/15    Row Information: <b>WDL Definition:</b> Warm; dry; intact; elastic; without discoloration; pressure points without redness<br> <font color=\"gray\"><i>Item=AS skin wdl>>List=AS skin wdl>>Version=F14</i></font>      Vitals     Vital Signs Flowsheet     VITAL SIGNS     Functioning  can do everything I need to 06/27/18 2145    Temp  97.2  F (36.2  C) 06/30/18 1018   Sleep  normal sleep 06/27/18 2145    Temp src  Oral 06/30/18 1018   ANALGESIA SIDE EFFECTS MONITORING      Resp  16 06/30/18 1018   Side Effects Monitoring: Respiratory Quality  R 06/29/18 1147    Pulse  75 06/30/18 0431   Side Effects Monitoring: Respiratory Depth  N 06/29/18 1147    Heart Rate  73 06/30/18 1018   Side Effects Monitoring: Sedation Level  1 06/29/18 1147    Pulse/Heart Rate Source  Monitor 06/30/18 1018   HEIGHT AND WEIGHT      BP  95/52 06/30/18 1018   Height  1.829 m (6') 06/26/18 0605    BP Location  Right arm 06/30/18 1018   Height Method  Stated (by pt / on drivers license ) 06/26/18 0605    OXYGEN THERAPY     Height Method  Estimated 06/25/18 1947    SpO2  94 % 06/30/18 1018   Weight  91.6 kg (201 lb 15.1 oz) 06/29/18 1725    O2 Device  None (Room air) 06/30/18 1018   Weight Method  Bed scale 06/29/18 1725 "    Oxygen Delivery  1 LPM 06/26/18 0314   YVROSE COMA SCALE      PAIN/COMFORT     Best Eye Response  4-->(E4) spontaneous 06/30/18 0943    Patient Currently in Pain  denies 06/30/18 0845   Best Motor Response  6-->(M6) obeys commands 06/30/18 0943    Preferred Pain Scale  CAPA (Clinically Aligned Pain Assessment) ([facility] Adults Only) 06/30/18 0845   Best Verbal Response  5-->(V5) oriented 06/30/18 0943    Pain Location  Generalized 06/27/18 2145   Yvrose Coma Scale Score  15 06/30/18 0943    Pain Descriptors  Sore 06/27/18 2145   POSITIONING      Pain Intervention(s)  Medication (See eMAR) 06/27/18 2145   Body Position  independently positioning 06/30/18 0943    CLINICALLY ALIGNED PAIN ASSESSMENT (CAPA) ([facility] ADULTS ONLY)     Head of Bed (HOB)  HOB at 30-45 degrees 06/30/18 0943    Comfort  comfortably manageable 06/30/18 0919   DAILY CARE      Change in Pain  about the same 06/27/18 2145   Activity Management  activity adjusted per tolerance;activity encouraged 06/30/18 0943    Pain Control  partially effective 06/27/18 2145   Activity Assistance Provided  assistance, 1 person 06/30/18 0943            Patient Lines/Drains/Airways Status    Active LINES/DRAINS/AIRWAYS     Name: Placement date: Placement time: Site: Days: Last dressing change:    Peripheral IV 06/29/18 Left;Posterior Upper forearm 06/29/18   1102   Upper forearm   1             Patient Lines/Drains/Airways Status    Active PICC/CVC     None            Intake/Output Detail Report     Date Intake     Output Net    Shift P.O. I.V. IV Piggyback Total Urine Total       Day 06/29/18 0000 - 06/29/18 0659 -- -- -- -- 400 400 -400    Earnestine 06/29/18 0700 - 06/29/18 1459 1080 -- -- 1080 -- -- 1080    Noc 06/29/18 1500 - 06/29/18 8069 -- -- -- -- -- -- 0    Day 06/30/18 0000 - 06/30/18 0659 -- -- -- -- -- -- 0    Earnestine 06/30/18 0700 - 06/30/18 1459 -- -- -- -- 300 300 -300      Last Void/BM       Most Recent Value    Urine  Occurrence 1 at 06/30/2018 0120    Stool Occurrence 1 at 06/30/2018 0120      Case Management/Discharge Planning     Case Management/Discharge Planning Flowsheet     LIVING ENVIRONMENT     MH/BH CAREGIVER      Lives With  facility resident 06/30/18 0904   Filed Complexity Screen Score  3 06/26/18 0913    Living Arrangements  other (see comments) ((Long term care [facility], planning to transition [facility])) 06/30/18 0904   ABUSE RISK SCREEN      Provides Primary Care For  no one, unable/limited ability to care for self 06/27/18 2025   QUESTION TO PATIENT:  Has a member of your family or a partner(now or in the past) intimidated, hurt, manipulated, or controlled you in any way?  no 06/27/18 2025    COPING/STRESS     QUESTION TO PATIENT: Do you feel safe going back to the place where you are living?  yes 06/27/18 2025    Major Change/Loss/Stressor  none 06/27/18 2025   OBSERVATION: Is there reason to believe there has been maltreatment of a vulnerable adult (ie. Physical/Sexual/Emotional abuse, self neglect, lack of adequate food, shelter, medical care, or financial exploitation)?  no 06/27/18 2025    DISCHARGE PLANNING     HOMICIDE RISK      Anticipated Changes Related to Illness  none 06/27/18 2025   Feels Like Hurting Others  no 06/27/18 2025    FINAL RESOURCES     OTHER      Equipment Currently Used at Home  none 06/30/18 0904   Are you depressed or being treated for depression?  No 06/27/18 2025

## 2018-06-25 NOTE — IP AVS SNAPSHOT
` ` Patient Information     Patient Name Sex     Ever Crane ([id number]) Male 1945       Room Bed    5228 5228-01      Patient Demographics     Address Phone    [address]  [facility] [id number] [phone] (Home)      Patient Ethnicity & Race     Ethnic Group Patient Race    American White      Emergency Contact(s)     Name Relation Home Work Mobile    Miriam Martin Other [phone]        Documents on File        Status Date Received Description       Documents for the Patient    Consent for EHR Access Received 18     Insurance Card       Patient ID Received 18     [facility] Specified Other       Privacy Notice - Success Received 18     Consent for Services - Hospital and Clinic Received 18     HIE Auth Received 18        Documents for the Encounter    CMS IM for Patient Signature Received 18     CMS GARCIA for Patient Signature Received 18 signed and placed in chart      Admission Information     Attending Provider Admitting Provider Admission Type Admission Date/Time    [facility]    Discharge Date Hospital Service Auth/Cert Status Service Area     Hospitalist Incomplete [facility]    Unit Room/Bed Admission Status       UU U5B 5228/5228- Admission (Confirmed)       Admission     Complaint    Pneumonia, Fever      Hospital Account     Name Acct ID Class Status Primary Coverage    Ever Crane [id number] Inpatient Open MEDICARE - MEDICARE            Guarantor Account (for Hospital Account #[id number])     Name Relation to Pt Service Area Active? Acct Type    Ever Crane Self FCS Yes Personal/Family    Address Phone          [address]  [place], [place] [id number] [phone](H)              Coverage Information (for Hospital Account #[id number])     1. MEDICARE/MEDICARE     F/O Payor/Plan Precert #    MEDICARE/MEDICARE     Subscriber Subscriber #    Ever Crane  [id number]    Address Phone    [employer]  [address]  [place] [address] [phone]          2. BLUE PLUS/BLUE PLUS MA     F/O Payor/Plan Precert #    BLUE PLUS/BLUE PLUS MA     Subscriber Subscriber #    JosereeEver [id number]    Address Phone    [address] [address]  [place], [place] [id number] [phone]

## 2018-06-25 NOTE — LETTER
Health Information Management Services               Recipient:    Snehal Millan   [phone]      Sender:    Gisella Hernandez, [facility]  [phone]      Date: June 27, 2018  Patient Name:  Ever Crane  Routing Message:            The documents accompanying this notice contain confidential information belonging to the sender.  This information is intended only for the use of the individual or entity named above.  The authorized recipient of this information is prohibited from disclosing this information to any other party and is required to destroy the information after its stated need has been fulfilled, unless otherwise required by state law.      If you are not the intended recipient, you are hereby notified that any disclosure, copying, distribution or action taken in reliance on the contents of these documents is strictly prohibited. If you have received this document in error, please notify [facility] immediately at [phone].  You may return the document via fax ([phone]) or return mail  (Health Information Management, , [address] [place] [address]).

## 2018-06-25 NOTE — IP AVS SNAPSHOT
[address] [facility]: [phone]                                              INTERAGENCY TRANSFER FORM - PHYSICIAN ORDERS   2018                    Hospital Admission Date: 2018  RANCHO SPENCER   : 1945  Sex: Male        Attending Provider: Michelle Lauren MD     Allergies:  Latex, Penicillins    Infection:  MRSA   Service:  HOSPITALIST    Ht:  1.829 m (6')   Wt:  91.6 kg (201 lb 15.1 oz)   Admission Wt:  88.5 kg (195 lb)    BMI:  27.39 kg/m 2   BSA:  2.16 m 2            Patient PCP Information     Provider PCP Type    Provider Not In System General      ED Clinical Impression     Diagnosis Description Comment Added By Time Added    Pneumonia due to infectious organism, unspecified laterality, unspecified part of lung [J18.9] Pneumonia due to infectious organism, unspecified laterality, unspecified part of lung [J18.9]  Levy Underwood DO 2018  1:31 AM      Hospital Problems as of 2018              Priority Class Noted POA    Pneumonia Medium  2018 Yes    Fever Medium  2018 Yes      Non-Hospital Problems as of 2018     None      Code Status History     Date Active Date Inactive Code Status Order ID Comments User Context    2018  8:16 AM  Full Code [id number]  Shadia Craft PA-C Outpatient    2018  3:09 AM 2018  8:16 AM Full Code [id number]  Jennifer Henderson PA Inpatient         Medication Review      START taking        Dose / Directions Comments    amoxicillin-clavulanate 875-125 MG per tablet   Commonly known as:  AUGMENTIN   Indication:  Community Acquired Pneumonia   Used for:  Pneumonia due to infectious organism, unspecified laterality, unspecified part of lung        Dose:  1 tablet   Take 1 tablet by mouth every 12 hours for 10 days   Refills:  0        * miconazole 2 % cream   Commonly known as:  MICATIN   Used for:  Candidal intertrigo        Apply topically 2 times daily   Quantity:  35 g   Refills:  0    Apply  until rash resolved.       * miconazole 2 % powder   Commonly known as:  MICATIN; MICRO GUARD   Used for:  Candidal intertrigo        Apply topically daily   Quantity:  43 g   Refills:  0        * Notice:  This list has 2 medication(s) that are the same as other medications prescribed for you. Read the directions carefully, and ask your doctor or other care provider to review them with you.      CONTINUE these medications which may have CHANGED, or have new prescriptions. If we are uncertain of the size of tablets/capsules you have at home, strength may be listed as something that might have changed.        Dose / Directions Comments    * glipiZIDE 10 MG tablet   Commonly known as:  GLUCOTROL   This may have changed:    - medication strength  - when to take this   Used for:  Type 2 diabetes mellitus without complication, unspecified long term insulin use status (H)        Dose:  10 mg   Take 1 tablet (10 mg) by mouth every morning (before breakfast)   Quantity:  30 tablet   Refills:  0        * glipiZIDE 5 MG tablet   Commonly known as:  GLUCOTROL   This may have changed:    - medication strength  - how much to take  - when to take this   Used for:  Type 2 diabetes mellitus without complication, unspecified long term insulin use status (H)        Dose:  15 mg   Take 3 tablets (15 mg) by mouth every evening   Quantity:  30 tablet   Refills:  0        lisinopril 10 MG tablet   Commonly known as:  PRINIVIL/ZESTRIL   This may have changed:  how much to take   Used for:  Benign essential hypertension        Dose:  5 mg   Take 0.5 tablets (5 mg) by mouth daily   Quantity:  30 tablet   Refills:  0        * warfarin 4 MG tablet   Commonly known as:  COUMADIN   This may have changed:    - medication strength  - how much to take   Used for:  Atrial fibrillation, unspecified type (H)        Dose:  4 mg   Take 1 tablet (4 mg) by mouth daily for 1 day   Quantity:  1 tablet   Refills:  0    Please take 4 mg of warfarin on 6/30  PM. Then okay to resume warfarin 1.5 mg daily, recommend repeat INR by 7/2 with adjustments as needed.       * warfarin 1 MG tablet   Commonly known as:  COUMADIN   This may have changed:  You were already taking a medication with the same name, and this prescription was added. Make sure you understand how and when to take each.   Used for:  Atrial fibrillation, unspecified type (H)        Dose:  1.5 mg   Start taking on:  7/1/2018   Take 1.5 tablets (1.5 mg) by mouth daily   Quantity:  30 tablet   Refills:  0    Please take 4 mg of warfarin on 6/30 PM. Then okay to resume warfarin 1.5 mg daily, recommend repeat INR by 7/2 with adjustments as needed.       * Notice:  This list has 4 medication(s) that are the same as other medications prescribed for you. Read the directions carefully, and ask your doctor or other care provider to review them with you.      CONTINUE these medications which have NOT CHANGED        Dose / Directions Comments    acetaminophen 500 MG tablet   Commonly known as:  TYLENOL        Dose:  1000 mg   Take 1,000 mg by mouth every 6 hours as needed   Refills:  0        albuterol (2.5 MG/3ML) 0.083% neb solution        Dose:  2.5 mg   Take 2.5 mg by nebulization every 6 hours as needed   Refills:  0        * furosemide 40 MG tablet   Commonly known as:  LASIX        Dose:  20 mg   Take 20 mg by mouth daily as needed Extra dose at noon each day if weight > 2 lbs in 24 h   Refills:  0        * furosemide 40 MG tablet   Commonly known as:  LASIX        Dose:  40 mg   Take 40 mg by mouth daily   Refills:  0        gentamicin 0.1 % ointment   Commonly known as:  GARAMYCIN        Dose:  1 Application   Apply 1 Application topically daily   Refills:  0        loperamide 2 MG capsule   Commonly known as:  IMODIUM        Dose:  2 mg   Take 2 mg by mouth 3 times daily as needed   Refills:  0        metoprolol succinate 50 MG 24 hr tablet   Commonly known as:  TOPROL-XL        Dose:  50 mg   Take 50 mg by  mouth 2 times daily   Refills:  0        nitroGLYcerin 0.4 MG sublingual tablet   Commonly known as:  NITROSTAT        Dose:  0.4 mg   Place 0.4 mg under the tongue every 5 minutes as needed   Refills:  0        omeprazole 20 MG CR capsule   Commonly known as:  priLOSEC        Dose:  20 mg   Take 20 mg by mouth daily   Refills:  0        simvastatin 40 MG tablet   Commonly known as:  ZOCOR        Dose:  40 mg   Take 40 mg by mouth At Bedtime   Refills:  0        spironolactone 25 MG tablet   Commonly known as:  ALDACTONE        Dose:  25 mg   Take 25 mg by mouth daily   Refills:  0        triamcinolone 0.1 % cream   Commonly known as:  KENALOG        Dose:  1 Application   Apply 1 Application topically daily   Refills:  0        urea 10 % cream   Commonly known as:  CARMOL        Dose:  1 Application   Apply 1 Application topically daily as needed   Refills:  0        VUSION 0.25-15-81.35 % Oint   Generic drug:  Miconazole-Zinc Oxide-Petrolat        Externally apply topically 2 times daily   Refills:  0        * Notice:  This list has 2 medication(s) that are the same as other medications prescribed for you. Read the directions carefully, and ask your doctor or other care provider to review them with you.            Summary of Visit     Reason for your hospital stay       You were hospitalized with altered mental status and sepsis likely due to a pneumonia. Your mentation improved drastically with IV antibiotics and was at baseline at time of discharge. You were transitioned from IV antibiotics to orals and tolerated this well.             After Care     Activity - Up with nursing assistance           Advance Diet as Tolerated       Follow this diet upon discharge: Orders Placed This Encounter      Fluid restriction 2000 ML FLUID      2 Gram Sodium Diet       Daily weights       Call Provider for weight gain of more than 2 pounds per day or 5 pounds per week.       Fall precautions           General info for SNF        Length of Stay Estimate: Long Term Care  Condition at Discharge: Stable  Level of care:board and care  Rehabilitation Potential: Fair  Admission H&P remains valid and up-to-date: Yes  Recent Chemotherapy: N/A  Use Nursing Home Standing Orders: Yes       Glucose monitor nursing POCT       Before meals and at bedtime       Intake and output       Every shift       Mantoux instructions       Give two-step Mantoux (PPD) Per Facility Policy Yes             Follow-Up Appointment Instructions     Future Labs/Procedures    Follow Up and recommended labs and tests     Comments:    Follow up with [facility] physician.  The following labs/tests are recommended: BMP, Mg, BP recheck.    Follow Up and recommended labs and tests     Comments:    Follow up with [facility] physician.  The following labs/tests are recommended: Please monitor INR and adjust warfarin as needed.      Follow-Up Appointment Instructions     Follow Up and recommended labs and tests       Follow up with [facility] physician.  The following labs/tests are recommended: BMP, Mg, BP recheck.       Follow Up and recommended labs and tests       Follow up with [facility] physician.  The following labs/tests are recommended: Please monitor INR and adjust warfarin as needed.             Statement of Approval     Ordered          06/30/18 1133  I have reviewed and agree with all the recommendations and orders detailed in this document.  EFFECTIVE NOW     Approved and electronically signed by:  Shadia Craft PA-C

## 2018-06-25 NOTE — IP AVS SNAPSHOT
[address] [address]    Phone:  [phone]                                       After Visit Summary   6/25/2018    Ever Crane    MRN: [id number]           After Visit Summary Signature Page     I have received my discharge instructions, and my questions have been answered. I have discussed any challenges I see with this plan with the nurse or doctor.    ..........................................................................................................................................  Patient/Patient Representative Signature      ..........................................................................................................................................  Patient Representative Print Name and Relationship to Patient    ..................................................               ................................................  Date                                            Time    ..........................................................................................................................................  Reviewed by Signature/Title    ...................................................              ..............................................  Date                                                            Time

## 2018-06-26 ENCOUNTER — APPOINTMENT (OUTPATIENT)
Dept: CARDIOLOGY | Facility: CLINIC | Age: 73
DRG: 871 | End: 2018-06-26
Attending: PSYCHIATRY & NEUROLOGY
Payer: MEDICARE

## 2018-06-26 PROBLEM — J18.9 PNEUMONIA: Status: ACTIVE | Noted: 2018-06-26

## 2018-06-26 LAB
ALBUMIN SERPL-MCNC: 3.4 G/DL (ref 3.4–5)
ALBUMIN UR-MCNC: NEGATIVE MG/DL
ALP SERPL-CCNC: 88 U/L (ref 40–150)
ALT SERPL W P-5'-P-CCNC: 17 U/L (ref 0–70)
AMPHETAMINES UR QL SCN: NEGATIVE
ANION GAP SERPL CALCULATED.3IONS-SCNC: 13 MMOL/L (ref 3–14)
APPEARANCE UR: CLEAR
AST SERPL W P-5'-P-CCNC: 13 U/L (ref 0–45)
BACTERIA SPEC CULT: NO GROWTH
BARBITURATES UR QL: NEGATIVE
BASE DEFICIT BLDV-SCNC: 2.7 MMOL/L
BASOPHILS # BLD AUTO: 0 10E9/L (ref 0–0.2)
BASOPHILS # BLD AUTO: 0 10E9/L (ref 0–0.2)
BASOPHILS NFR BLD AUTO: 0.1 %
BASOPHILS NFR BLD AUTO: 0.2 %
BENZODIAZ UR QL: NEGATIVE
BILIRUB SERPL-MCNC: 1.1 MG/DL (ref 0.2–1.3)
BILIRUB UR QL STRIP: NEGATIVE
BUN SERPL-MCNC: 44 MG/DL (ref 7–30)
CALCIUM SERPL-MCNC: 8.3 MG/DL (ref 8.5–10.1)
CANNABINOIDS UR QL SCN: NEGATIVE
CHLORIDE SERPL-SCNC: 107 MMOL/L (ref 94–109)
CO2 SERPL-SCNC: 19 MMOL/L (ref 20–32)
COCAINE UR QL: NEGATIVE
COLOR UR AUTO: ABNORMAL
CREAT SERPL-MCNC: 1.28 MG/DL (ref 0.66–1.25)
CRP SERPL-MCNC: 50 MG/L (ref 0–8)
DIFFERENTIAL METHOD BLD: ABNORMAL
DIFFERENTIAL METHOD BLD: ABNORMAL
EOSINOPHIL # BLD AUTO: 0 10E9/L (ref 0–0.7)
EOSINOPHIL # BLD AUTO: 0 10E9/L (ref 0–0.7)
EOSINOPHIL NFR BLD AUTO: 0 %
EOSINOPHIL NFR BLD AUTO: 0.1 %
ERYTHROCYTE [DISTWIDTH] IN BLOOD BY AUTOMATED COUNT: 13.9 % (ref 10–15)
ERYTHROCYTE [DISTWIDTH] IN BLOOD BY AUTOMATED COUNT: 14.2 % (ref 10–15)
ETHANOL UR QL SCN: NEGATIVE
GFR SERPL CREATININE-BSD FRML MDRD: 55 ML/MIN/1.7M2
GLUCOSE BLDC GLUCOMTR-MCNC: 114 MG/DL (ref 70–99)
GLUCOSE BLDC GLUCOMTR-MCNC: 129 MG/DL (ref 70–99)
GLUCOSE BLDC GLUCOMTR-MCNC: 149 MG/DL (ref 70–99)
GLUCOSE BLDC GLUCOMTR-MCNC: 211 MG/DL (ref 70–99)
GLUCOSE SERPL-MCNC: 150 MG/DL (ref 70–99)
GLUCOSE UR STRIP-MCNC: NEGATIVE MG/DL
HBA1C MFR BLD: 7.7 % (ref 0–5.6)
HCO3 BLDV-SCNC: 23 MMOL/L (ref 21–28)
HCT VFR BLD AUTO: 34.4 % (ref 40–53)
HCT VFR BLD AUTO: 36.2 % (ref 40–53)
HGB BLD-MCNC: 11.2 G/DL (ref 13.3–17.7)
HGB BLD-MCNC: 12.1 G/DL (ref 13.3–17.7)
HGB UR QL STRIP: ABNORMAL
IMM GRANULOCYTES # BLD: 0 10E9/L (ref 0–0.4)
IMM GRANULOCYTES # BLD: 0.1 10E9/L (ref 0–0.4)
IMM GRANULOCYTES NFR BLD: 0.3 %
IMM GRANULOCYTES NFR BLD: 0.4 %
INR PPP: 2.57 (ref 0.86–1.14)
INTERPRETATION ECG - MUSE: NORMAL
KETONES UR STRIP-MCNC: NEGATIVE MG/DL
LACTATE BLD-SCNC: 2.1 MMOL/L (ref 0.7–2)
LACTATE BLD-SCNC: 2.2 MMOL/L (ref 0.7–2)
LEUKOCYTE ESTERASE UR QL STRIP: NEGATIVE
LYMPHOCYTES # BLD AUTO: 0.5 10E9/L (ref 0.8–5.3)
LYMPHOCYTES # BLD AUTO: 0.8 10E9/L (ref 0.8–5.3)
LYMPHOCYTES NFR BLD AUTO: 2.1 %
LYMPHOCYTES NFR BLD AUTO: 4.9 %
MAGNESIUM SERPL-MCNC: 1.8 MG/DL (ref 1.6–2.3)
MCH RBC QN AUTO: 28.7 PG (ref 26.5–33)
MCH RBC QN AUTO: 29 PG (ref 26.5–33)
MCHC RBC AUTO-ENTMCNC: 32.6 G/DL (ref 31.5–36.5)
MCHC RBC AUTO-ENTMCNC: 33.4 G/DL (ref 31.5–36.5)
MCV RBC AUTO: 87 FL (ref 78–100)
MCV RBC AUTO: 88 FL (ref 78–100)
MONOCYTES # BLD AUTO: 0.4 10E9/L (ref 0–1.3)
MONOCYTES # BLD AUTO: 1.1 10E9/L (ref 0–1.3)
MONOCYTES NFR BLD AUTO: 2.4 %
MONOCYTES NFR BLD AUTO: 4.7 %
NEUTROPHILS # BLD AUTO: 14.1 10E9/L (ref 1.6–8.3)
NEUTROPHILS # BLD AUTO: 21.7 10E9/L (ref 1.6–8.3)
NEUTROPHILS NFR BLD AUTO: 92.1 %
NEUTROPHILS NFR BLD AUTO: 92.7 %
NITRATE UR QL: NEGATIVE
NRBC # BLD AUTO: 0 10*3/UL
NRBC # BLD AUTO: 0 10*3/UL
NRBC BLD AUTO-RTO: 0 /100
NRBC BLD AUTO-RTO: 0 /100
NT-PROBNP SERPL-MCNC: 2005 PG/ML (ref 0–900)
O2/TOTAL GAS SETTING VFR VENT: 2 %
OPIATES UR QL SCN: NEGATIVE
PCO2 BLDV: 40 MM HG (ref 40–50)
PH BLDV: 7.36 PH (ref 7.32–7.43)
PH UR STRIP: 5 PH (ref 5–7)
PLATELET # BLD AUTO: 102 10E9/L (ref 150–450)
PLATELET # BLD AUTO: 93 10E9/L (ref 150–450)
PO2 BLDV: 24 MM HG (ref 25–47)
POTASSIUM SERPL-SCNC: 4.8 MMOL/L (ref 3.4–5.3)
PROCALCITONIN SERPL-MCNC: 0.48 NG/ML
PROT SERPL-MCNC: 7.7 G/DL (ref 6.8–8.8)
RBC # BLD AUTO: 3.9 10E12/L (ref 4.4–5.9)
RBC # BLD AUTO: 4.17 10E12/L (ref 4.4–5.9)
RBC #/AREA URNS AUTO: 1 /HPF (ref 0–2)
SODIUM SERPL-SCNC: 139 MMOL/L (ref 133–144)
SOURCE: ABNORMAL
SP GR UR STRIP: 1.01 (ref 1–1.03)
SPECIMEN SOURCE: NORMAL
SQUAMOUS #/AREA URNS AUTO: <1 /HPF (ref 0–1)
TROPONIN I SERPL-MCNC: <0.015 UG/L (ref 0–0.04)
TSH SERPL DL<=0.005 MIU/L-ACNC: 0.86 MU/L (ref 0.4–4)
UROBILINOGEN UR STRIP-MCNC: NORMAL MG/DL (ref 0–2)
WBC # BLD AUTO: 15.3 10E9/L (ref 4–11)
WBC # BLD AUTO: 23.4 10E9/L (ref 4–11)
WBC #/AREA URNS AUTO: <1 /HPF (ref 0–5)

## 2018-06-26 PROCEDURE — 96366 THER/PROPH/DIAG IV INF ADDON: CPT

## 2018-06-26 PROCEDURE — 84484 ASSAY OF TROPONIN QUANT: CPT | Performed by: PHYSICIAN ASSISTANT

## 2018-06-26 PROCEDURE — 36415 COLL VENOUS BLD VENIPUNCTURE: CPT | Performed by: PHYSICIAN ASSISTANT

## 2018-06-26 PROCEDURE — 25000128 H RX IP 250 OP 636: Performed by: PHYSICIAN ASSISTANT

## 2018-06-26 PROCEDURE — 85025 COMPLETE CBC W/AUTO DIFF WBC: CPT | Performed by: PHYSICIAN ASSISTANT

## 2018-06-26 PROCEDURE — 83605 ASSAY OF LACTIC ACID: CPT | Performed by: NURSE PRACTITIONER

## 2018-06-26 PROCEDURE — 00000146 ZZHCL STATISTIC GLUCOSE BY METER IP

## 2018-06-26 PROCEDURE — A9270 NON-COVERED ITEM OR SERVICE: HCPCS | Mod: GY | Performed by: PHYSICIAN ASSISTANT

## 2018-06-26 PROCEDURE — 83880 ASSAY OF NATRIURETIC PEPTIDE: CPT | Performed by: PHYSICIAN ASSISTANT

## 2018-06-26 PROCEDURE — 85025 COMPLETE CBC W/AUTO DIFF WBC: CPT | Performed by: NURSE PRACTITIONER

## 2018-06-26 PROCEDURE — 83735 ASSAY OF MAGNESIUM: CPT | Performed by: PHYSICIAN ASSISTANT

## 2018-06-26 PROCEDURE — 82803 BLOOD GASES ANY COMBINATION: CPT | Performed by: PHYSICIAN ASSISTANT

## 2018-06-26 PROCEDURE — 25000132 ZZH RX MED GY IP 250 OP 250 PS 637: Mod: GY | Performed by: NURSE PRACTITIONER

## 2018-06-26 PROCEDURE — 99220 ZZC INITIAL OBSERVATION CARE,LEVL III: CPT | Mod: 25 | Performed by: PHYSICIAN ASSISTANT

## 2018-06-26 PROCEDURE — 93010 ELECTROCARDIOGRAM REPORT: CPT | Mod: Z6 | Performed by: PHYSICIAN ASSISTANT

## 2018-06-26 PROCEDURE — 25000131 ZZH RX MED GY IP 250 OP 636 PS 637: Mod: GY | Performed by: PHYSICIAN ASSISTANT

## 2018-06-26 PROCEDURE — 25000128 H RX IP 250 OP 636: Performed by: EMERGENCY MEDICINE

## 2018-06-26 PROCEDURE — 86140 C-REACTIVE PROTEIN: CPT | Performed by: PHYSICIAN ASSISTANT

## 2018-06-26 PROCEDURE — A9270 NON-COVERED ITEM OR SERVICE: HCPCS

## 2018-06-26 PROCEDURE — 83605 ASSAY OF LACTIC ACID: CPT | Performed by: PHYSICIAN ASSISTANT

## 2018-06-26 PROCEDURE — 40000264 ECHO COMPLETE WITH OPTISON

## 2018-06-26 PROCEDURE — 93306 TTE W/DOPPLER COMPLETE: CPT | Mod: 26 | Performed by: INTERNAL MEDICINE

## 2018-06-26 PROCEDURE — 36415 COLL VENOUS BLD VENIPUNCTURE: CPT | Performed by: NURSE PRACTITIONER

## 2018-06-26 PROCEDURE — 84145 PROCALCITONIN (PCT): CPT | Performed by: PHYSICIAN ASSISTANT

## 2018-06-26 PROCEDURE — 85610 PROTHROMBIN TIME: CPT | Performed by: PHYSICIAN ASSISTANT

## 2018-06-26 PROCEDURE — 83036 HEMOGLOBIN GLYCOSYLATED A1C: CPT | Performed by: PHYSICIAN ASSISTANT

## 2018-06-26 PROCEDURE — 80053 COMPREHEN METABOLIC PANEL: CPT | Performed by: PHYSICIAN ASSISTANT

## 2018-06-26 PROCEDURE — 25000132 ZZH RX MED GY IP 250 OP 250 PS 637: Mod: GY | Performed by: PHYSICIAN ASSISTANT

## 2018-06-26 PROCEDURE — G0378 HOSPITAL OBSERVATION PER HR: HCPCS

## 2018-06-26 PROCEDURE — A9270 NON-COVERED ITEM OR SERVICE: HCPCS | Mod: GY | Performed by: NURSE PRACTITIONER

## 2018-06-26 PROCEDURE — 25500064 ZZH RX 255 OP 636: Performed by: EMERGENCY MEDICINE

## 2018-06-26 PROCEDURE — 96367 TX/PROPH/DG ADDL SEQ IV INF: CPT | Performed by: EMERGENCY MEDICINE

## 2018-06-26 PROCEDURE — 84443 ASSAY THYROID STIM HORMONE: CPT | Performed by: PHYSICIAN ASSISTANT

## 2018-06-26 PROCEDURE — 25000132 ZZH RX MED GY IP 250 OP 250 PS 637

## 2018-06-26 PROCEDURE — 96372 THER/PROPH/DIAG INJ SC/IM: CPT

## 2018-06-26 PROCEDURE — 96367 TX/PROPH/DG ADDL SEQ IV INF: CPT

## 2018-06-26 RX ORDER — ALBUTEROL SULFATE 0.83 MG/ML
2.5 SOLUTION RESPIRATORY (INHALATION)
Status: DISCONTINUED | OUTPATIENT
Start: 2018-06-26 | End: 2018-06-30 | Stop reason: HOSPADM

## 2018-06-26 RX ORDER — NICOTINE POLACRILEX 4 MG
15-30 LOZENGE BUCCAL
Status: DISCONTINUED | OUTPATIENT
Start: 2018-06-26 | End: 2018-06-30 | Stop reason: HOSPADM

## 2018-06-26 RX ORDER — UREA 8.5 G/85G
1 CREAM TOPICAL DAILY PRN
COMMUNITY
End: 2023-01-20

## 2018-06-26 RX ORDER — WARFARIN SODIUM 10 MG/1
1.5 TABLET ORAL DAILY
Status: ON HOLD | COMMUNITY
Start: 2018-02-01 | End: 2018-06-30

## 2018-06-26 RX ORDER — SODIUM CHLORIDE 9 MG/ML
INJECTION, SOLUTION INTRAVENOUS CONTINUOUS
Status: DISCONTINUED | OUTPATIENT
Start: 2018-06-26 | End: 2018-06-27

## 2018-06-26 RX ORDER — MICONAZOLE NITRATE, ZINC OXIDE, WHITE PETROLATUM 2.5; 150; 813.5 MG/G; MG/G; MG/G
OINTMENT TOPICAL 2 TIMES DAILY
COMMUNITY
End: 2022-06-03

## 2018-06-26 RX ORDER — PIPERACILLIN SODIUM, TAZOBACTAM SODIUM 4; .5 G/20ML; G/20ML
4.5 INJECTION, POWDER, LYOPHILIZED, FOR SOLUTION INTRAVENOUS EVERY 6 HOURS
Status: DISCONTINUED | OUTPATIENT
Start: 2018-06-26 | End: 2018-06-26

## 2018-06-26 RX ORDER — LOPERAMIDE HCL 2 MG
2 CAPSULE ORAL 3 TIMES DAILY PRN
COMMUNITY
Start: 2017-11-09 | End: 2022-06-03

## 2018-06-26 RX ORDER — ACETAMINOPHEN 325 MG/1
650 TABLET ORAL EVERY 6 HOURS PRN
COMMUNITY
Start: 2017-11-09 | End: 2023-03-02

## 2018-06-26 RX ORDER — LISINOPRIL 10 MG/1
10 TABLET ORAL DAILY
Status: DISCONTINUED | OUTPATIENT
Start: 2018-06-26 | End: 2018-06-28

## 2018-06-26 RX ORDER — ONDANSETRON 4 MG/1
4 TABLET, ORALLY DISINTEGRATING ORAL EVERY 6 HOURS PRN
Status: DISCONTINUED | OUTPATIENT
Start: 2018-06-26 | End: 2018-06-30 | Stop reason: HOSPADM

## 2018-06-26 RX ORDER — GENTAMICIN SULFATE 1 MG/G
1 OINTMENT TOPICAL DAILY
COMMUNITY
Start: 2018-02-19 | End: 2022-06-03

## 2018-06-26 RX ORDER — LISINOPRIL 10 MG/1
10 TABLET ORAL DAILY
Status: ON HOLD | COMMUNITY
End: 2018-06-30

## 2018-06-26 RX ORDER — METOPROLOL SUCCINATE 50 MG/1
50 TABLET, EXTENDED RELEASE ORAL 2 TIMES DAILY
Status: DISCONTINUED | OUTPATIENT
Start: 2018-06-26 | End: 2018-06-30 | Stop reason: HOSPADM

## 2018-06-26 RX ORDER — FUROSEMIDE 40 MG
20 TABLET ORAL DAILY PRN
COMMUNITY
Start: 2018-01-28 | End: 2022-05-18

## 2018-06-26 RX ORDER — SPIRONOLACTONE 25 MG/1
25 TABLET ORAL DAILY
Status: DISCONTINUED | OUTPATIENT
Start: 2018-06-26 | End: 2018-06-26

## 2018-06-26 RX ORDER — LEVOFLOXACIN 5 MG/ML
750 INJECTION, SOLUTION INTRAVENOUS EVERY 24 HOURS
Status: DISCONTINUED | OUTPATIENT
Start: 2018-06-26 | End: 2018-06-27

## 2018-06-26 RX ORDER — ACETAMINOPHEN 500 MG
1000 TABLET ORAL 2 TIMES DAILY PRN
Status: DISCONTINUED | OUTPATIENT
Start: 2018-06-26 | End: 2018-06-26

## 2018-06-26 RX ORDER — GLIPIZIDE 10 MG/1
10 TABLET ORAL 2 TIMES DAILY
Status: DISCONTINUED | OUTPATIENT
Start: 2018-06-26 | End: 2018-06-26

## 2018-06-26 RX ORDER — SIMVASTATIN 20 MG
40 TABLET ORAL AT BEDTIME
Status: DISCONTINUED | OUTPATIENT
Start: 2018-06-26 | End: 2018-06-30 | Stop reason: HOSPADM

## 2018-06-26 RX ORDER — TRIAMCINOLONE ACETONIDE 1 MG/G
1 CREAM TOPICAL DAILY
COMMUNITY
Start: 2018-06-21 | End: 2023-01-20

## 2018-06-26 RX ORDER — FUROSEMIDE 20 MG
20 TABLET ORAL DAILY PRN
Status: DISCONTINUED | OUTPATIENT
Start: 2018-06-26 | End: 2018-06-28

## 2018-06-26 RX ORDER — ONDANSETRON 2 MG/ML
4 INJECTION INTRAMUSCULAR; INTRAVENOUS EVERY 6 HOURS PRN
Status: DISCONTINUED | OUTPATIENT
Start: 2018-06-26 | End: 2018-06-30 | Stop reason: HOSPADM

## 2018-06-26 RX ORDER — METOPROLOL SUCCINATE 50 MG/1
50 TABLET, EXTENDED RELEASE ORAL 2 TIMES DAILY
COMMUNITY
End: 2022-06-03

## 2018-06-26 RX ORDER — FUROSEMIDE 40 MG
40 TABLET ORAL DAILY
COMMUNITY
Start: 2018-02-26 | End: 2022-06-03

## 2018-06-26 RX ORDER — FUROSEMIDE 40 MG
40 TABLET ORAL DAILY
Status: DISCONTINUED | OUTPATIENT
Start: 2018-06-26 | End: 2018-06-26

## 2018-06-26 RX ORDER — GLIPIZIDE 10 MG/1
10-15 TABLET ORAL 2 TIMES DAILY
Status: DISCONTINUED | OUTPATIENT
Start: 2018-06-26 | End: 2018-06-26

## 2018-06-26 RX ORDER — DEXTROSE MONOHYDRATE 25 G/50ML
25-50 INJECTION, SOLUTION INTRAVENOUS
Status: DISCONTINUED | OUTPATIENT
Start: 2018-06-26 | End: 2018-06-30 | Stop reason: HOSPADM

## 2018-06-26 RX ORDER — SPIRONOLACTONE 25 MG/1
25 TABLET ORAL DAILY
COMMUNITY
Start: 2018-01-29

## 2018-06-26 RX ORDER — METOPROLOL SUCCINATE 25 MG/1
50 TABLET, EXTENDED RELEASE ORAL 2 TIMES DAILY
Status: DISCONTINUED | OUTPATIENT
Start: 2018-06-26 | End: 2018-06-26

## 2018-06-26 RX ORDER — ALBUTEROL SULFATE 0.83 MG/ML
2.5 SOLUTION RESPIRATORY (INHALATION) EVERY 6 HOURS PRN
Status: DISCONTINUED | OUTPATIENT
Start: 2018-06-26 | End: 2018-06-26

## 2018-06-26 RX ORDER — MICONAZOLE NITRATE 20 MG/G
CREAM TOPICAL 2 TIMES DAILY
Status: DISCONTINUED | OUTPATIENT
Start: 2018-06-26 | End: 2018-06-30 | Stop reason: HOSPADM

## 2018-06-26 RX ORDER — SIMVASTATIN 40 MG
40 TABLET ORAL AT BEDTIME
COMMUNITY

## 2018-06-26 RX ORDER — ACETAMINOPHEN 500 MG
1000 TABLET ORAL EVERY 6 HOURS PRN
Status: DISCONTINUED | OUTPATIENT
Start: 2018-06-26 | End: 2018-06-30 | Stop reason: HOSPADM

## 2018-06-26 RX ORDER — LIDOCAINE 40 MG/G
CREAM TOPICAL
Status: DISCONTINUED | OUTPATIENT
Start: 2018-06-26 | End: 2018-06-30 | Stop reason: HOSPADM

## 2018-06-26 RX ORDER — GLIPIZIDE 5 MG/1
10 TABLET ORAL 2 TIMES DAILY
Status: ON HOLD | COMMUNITY
End: 2018-06-30

## 2018-06-26 RX ORDER — NITROGLYCERIN 0.4 MG/1
0.4 TABLET SUBLINGUAL EVERY 5 MIN PRN
COMMUNITY
Start: 2017-03-01 | End: 2023-05-03

## 2018-06-26 RX ORDER — ALBUTEROL SULFATE 0.83 MG/ML
2.5 SOLUTION RESPIRATORY (INHALATION) EVERY 6 HOURS PRN
COMMUNITY
Start: 2018-01-28 | End: 2023-01-20

## 2018-06-26 RX ADMIN — MICONAZOLE NITRATE: 2 POWDER TOPICAL at 13:28

## 2018-06-26 RX ADMIN — METOPROLOL SUCCINATE 50 MG: 50 TABLET, EXTENDED RELEASE ORAL at 20:51

## 2018-06-26 RX ADMIN — LEVOFLOXACIN 750 MG: 5 INJECTION, SOLUTION INTRAVENOUS at 09:16

## 2018-06-26 RX ADMIN — SIMVASTATIN 40 MG: 20 TABLET, FILM COATED ORAL at 21:36

## 2018-06-26 RX ADMIN — SODIUM CHLORIDE: 9 INJECTION, SOLUTION INTRAVENOUS at 13:32

## 2018-06-26 RX ADMIN — LISINOPRIL 10 MG: 10 TABLET ORAL at 08:59

## 2018-06-26 RX ADMIN — MICONAZOLE NITRATE: 20 CREAM TOPICAL at 20:51

## 2018-06-26 RX ADMIN — ACETAMINOPHEN 1000 MG: 500 TABLET, FILM COATED ORAL at 03:50

## 2018-06-26 RX ADMIN — SODIUM CHLORIDE 500 ML: 9 INJECTION, SOLUTION INTRAVENOUS at 07:25

## 2018-06-26 RX ADMIN — PIPERACILLIN SODIUM,TAZOBACTAM SODIUM 4.5 G: 4; .5 INJECTION, POWDER, FOR SOLUTION INTRAVENOUS at 00:47

## 2018-06-26 RX ADMIN — OMEPRAZOLE 20 MG: 20 CAPSULE, DELAYED RELEASE ORAL at 08:59

## 2018-06-26 RX ADMIN — WARFARIN SODIUM 1.5 MG: 3 TABLET ORAL at 18:22

## 2018-06-26 RX ADMIN — MICONAZOLE NITRATE: 20 CREAM TOPICAL at 08:59

## 2018-06-26 RX ADMIN — HUMAN ALBUMIN MICROSPHERES AND PERFLUTREN 5 ML: 10; .22 INJECTION, SOLUTION INTRAVENOUS at 11:30

## 2018-06-26 RX ADMIN — SODIUM CHLORIDE: 9 INJECTION, SOLUTION INTRAVENOUS at 21:36

## 2018-06-26 RX ADMIN — SODIUM CHLORIDE 1000 ML: 9 INJECTION, SOLUTION INTRAVENOUS at 00:28

## 2018-06-26 RX ADMIN — INSULIN ASPART 2 UNITS: 100 INJECTION, SOLUTION INTRAVENOUS; SUBCUTANEOUS at 13:27

## 2018-06-26 ASSESSMENT — PAIN DESCRIPTION - DESCRIPTORS: DESCRIPTORS: DISCOMFORT

## 2018-06-26 NOTE — PROGRESS NOTES
8:40 AM The patient was seen and examined by me and the case was discussed with the JAYDON. We are in agreement with the assessment and plan.    Past Medical History:   Diagnosis Date     A-fib (H)      MESHA (acute kidney injury) (H)      CHF (congestive heart failure) (H)      Diabetes (H)      Epistaxis      GERD (gastroesophageal reflux disease)      HLD (hyperlipidemia)      HTN (hypertension)      Hyponatremia      Peripheral vascular disease (H)      Social History     Social History     Marital status:      Spouse name: N/A     Number of children: N/A     Years of education: N/A     Occupational History     Not on file.     Social History Main Topics     Smoking status: Former Smoker     Smokeless tobacco: Not on file     Alcohol use 0.6 oz/week     1 Cans of beer per week     Drug use: No     Sexual activity: No     Other Topics Concern     Not on file     Social History Narrative     No narrative on file     8:41 AM The patient was seen and examined by me and the case was discussed with the JAYDON. We are in agreement with the assessment and plan.  This is a 73-year-old male who was found driving erratically in his car.  He has been living in a care facility.  He was stopped by the police and ultimately sent to the emergency department for concerns of altered mental status.  Workup in the ED revealed an elevated white count at 23,000 no evidence for urinary infection.  He is a diabetic but was not hypoglycemic.  Search for infection revealed likely pneumonia.  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  He had a head CT done that was negative  Results for orders placed or performed during the hospital encounter of 06/25/18   CT Head w/o Contrast    Narrative    CT HEAD W/O CONTRAST 6/25/2018 9:25 PM    Provided History: Altered mental status    Comparison: None.    Technique: Using multidetector thin collimation helical  acquisition  technique, axial, coronal and sagittal CT images from the skull base  to the vertex were obtained without intravenous contrast.     Findings:    No intracranial hemorrhage, mass effect, or midline shift. The  ventricles are proportionate to the cerebral sulci. Mild generalized  parenchymal volume loss. The gray to white matter differentiation of  the cerebral hemispheres is preserved. There are patchy  periventricular/subcortical white matter hypodensities which are  nonspecific, but given the patient's age and intracranial vascular  calcifications, are favored to represent sequelae of chronic small  vessel ischemic disease. The basal cisterns are patent.     The visualized paranasal sinuses are clear. The mastoid air cells are  clear.       Impression    Impression: No acute intracranial pathology suspected.    I have personally reviewed the examination and initial interpretation  and I agree with the findings.    HUI PURDY MD   XR Chest Port 1 View    Narrative    Exam:  XR CHEST PORT 1 VW, 6/25/2018 8:37 PM    History: Altered mental status;     Comparison:  None available.    Findings:  Single AP view of the chest. Postoperative changes of CABG  including median sternotomy wires and mediastinal surgical clips.  Cardiac silhouette is enlarged. Trace bilateral pleural effusions and  adjacent basilar opacities. Perihilar and mixed interstitial and  airspace opacities. No pneumothorax. Visualized upper abdomen and  bones are unremarkable.      Impression    Impression:    1. Small bilateral pleural effusions and adjacent basilar atelectasis  and/or minor consolidation.  2. Mild perihilar and mixed interstitial and airspace opacities  favored represent pulmonary edema.  3. Cardiomegaly.    I have personally reviewed the examination and initial interpretation  and I agree with the findings.    CLEMENTINE VENTURA MD     Patient reports that he is normally high functioning and is in the care facility  because of the foot amputation.  He did have a fever in the emergency department.  He was initiated on Zosyn and vancomycin.  He quit smoking 18 years ago.  He has had a cough.    Physical exam:  General: Awake alert he is oriented does not seem to be in distress  Cardiac: Regular rate and rhythm  Respiratory: Lungs are grossly clear.  Extremities: Examination of lower extremity there was a Kerlix dressing in place it was removed I do not appreciate any open wound or anything suggestive of infection.    Assessment and plan: 73-year-old male with elevated white count malaise low-grade fever and some confusion.  Likely related to pulmonary infection.  Doubt meningitis no evidence for urinary infection or soft tissue infection.  Will convert to typical inpatient community-acquired pneumonia orders continue IV fluids encourage oral intake and ambulation will follow his white count as indicator of progress of the infection.  Likely will be here 1 more day pending improvement.  If he fails to improve or his condition worsens I believe he would meet inpatient criteria at that point.

## 2018-06-26 NOTE — PLAN OF CARE
Problem: Patient Care Overview  Goal: Discharge Needs Assessment  - Dyspnea improved and oxygen saturations greater than 88% on room air or prior home oxygen levels - No, on 2LPM  - Tolerates oral antibiotics or has plans for home infusion set up. -No, IV antibiotics   - Vital signs normal or at patient baseline -Yes  - Infection is improving -Ongoing  - Return to baseline functional status- No   - Safe disposition plan has been identified - Yes  - Nurse to notify provider when observation goals have been met and patient is ready for discharge.    Pt disoriented to place and situation. Bed alarm on. PIV pulled out by pt. Needs frequent redirecting. Will continue to monitor
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
## 2018-06-26 NOTE — PLAN OF CARE
Problem: Patient Care Overview  Goal: Plan of Care/Patient Progress Review  Outpatient/Observation goals to be met before discharge home:    - Dyspnea improved and oxygen saturations greater than 88% on room air or prior home oxygen levels - No, on 2LPM  - Tolerates oral antibiotics or has plans for home infusion set up. -No, IV antibiotics   - Vital signs normal or at patient baseline -Yes  - Infection is improving -Ongoing  - Return to baseline functional status- No   - Safe disposition plan has been identified - Yes  - Nurse to notify provider when observation goals have been met and patient is ready for discharge.     Pt disoriented to place and situation. Bed alarm on. PIV pulled out by pt. Needs frequent redirecting. Will continue to monitor
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
## 2018-06-26 NOTE — PHARMACY-VANCOMYCIN DOSING SERVICE
Pharmacy Empiric Dose Change Per Policy  Original Dose Ordered: Vancomycin 2250 mg IV x1 then dosed intermittently based on levels  Dose Changed To: Vancomycin 1750 mg (~ 19.8 mg/kg/dose) IV q12h    Scr 1.28, estimated CrCl 64 mL/min  Wt 88.5 kg  Last dose of vancomycin given at 2213 on 6/25      This dose change was based on the pharmacist's assessment of this patient's age, weight, concurrent drug therapy, treatment goals, whether patient's creatinine clearance adequately indicates renal function (factoring in age, muscle mass, fluid and clinical status), and, if applicable, prior pharmacokinetic data.    Creatinine Clearance=     Estimated Creatinine Clearance: 64.3 mL/min (based on Cr of 1.28).  Will continue to follow and modify dosage according to levels, organ function and clinical condition    Justino Morrow, PharmD -- pager [phone]

## 2018-06-26 NOTE — PLAN OF CARE
Problem: Patient Care Overview  Goal: Plan of Care/Patient Progress Review  Outpatient/Observation goals to be met before discharge home:    - Dyspnea improved and oxygen saturations greater than 88% on room air or prior home oxygen levels - Yes, Patient is 95% on room air.  - Tolerates oral antibiotics or has plans for home infusion set up. -No, IV antibiotics   - Vital signs normal or at patient baseline -Yes  - Infection is improving -Ongoing  - Return to baseline functional status- No   - Safe disposition plan has been identified - Yes  - Nurse to notify provider when observation goals have been met and patient is ready for discharge.      Pt's disorientation is improving. Alarm on. Will continue to monitor

## 2018-06-26 NOTE — PHARMACY-VANCOMYCIN DOSING SERVICE
Pharmacy Vancomycin Initial Note  Date of Service 2018  Patient's  1945  73 year old, male    Indication: Sepsis    Current estimated CrCl = CrCl cannot be calculated (Unknown ideal weight.).  Patient's height currently unavailable     Creatinine for last 3 days  2018:  8:04 PM Creatinine 1.33 mg/dL    Recent Vancomycin Level(s) for last 3 days  No results found for requested labs within last 72 hours.      Vancomycin IV Administrations (past 72 hours)      No vancomycin orders with administrations in past 72 hours.                Nephrotoxins and other renal medications (Future)    Start     Dose/Rate Route Frequency Ordered Stop    18  vancomycin place escalera - receiving intermittent dosing      1 each Does not apply SEE ADMIN INSTRUCTIONS 18  vancomycin (VANCOCIN) 2,250 mg in sodium chloride 0.9 % 500 mL intermittent infusion      2,250 mg  over 2 Hours Intravenous ONCE 18  piperacillin-tazobactam (ZOSYN) 4.5 g vial to attach to  mL bag      4.5 g  over 30 Minutes Intravenous ONCE 18            Contrast Orders - past 72 hours     None                Plan:  1.  Start intermittent vancomycin dosing. Give vancomycin  2250 mg IV x1 (~25mg/kg using ABW = 88.5kg). Pharmacy will reassess renal function in AM to determine further dosing.   2.  Goal Trough Level: 15-20 mg/L   3.  Pharmacy will check trough levels as appropriate in 1-3 Days.    4. Serum creatinine levels will be ordered daily for the first week of therapy and at least twice weekly for subsequent weeks.    5. [place] method utilized to dose vancomycin therapy: Method 2    Chasity Hamilton, PharmD

## 2018-06-26 NOTE — ED NOTES
Vancomycin infiltrated in left upper forearm. 7x7cm. Approximately 150mL infused. Spoke with pharmacist, cold compress applied. MD and vascular access made aware. Vasc RN will come take pictures when able.
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
## 2018-06-26 NOTE — PHARMACY-ADMISSION MEDICATION HISTORY
Admission medication history interview status for the 6/25/2018 admission is complete. See Epic admission navigator for allergy information, pharmacy, prior to admission medications and immunization status.     Medication history interview sources:  MAR from [facility], [facility] discharge notes    Changes made to PTA medication list (reason)  Added: last doses of all meds  Deleted: None  Changed: None    Additional medication history information (including reliability of information, actions taken by pharmacist):    Medication history reviewed using MAR sent by [facility]. Also called facility to verify warfarin therapy.    Allergies reviewed using MAR sent by [facility].    Warfarin: Per facility, patient is currently taking warfarin 1.5mg daily for Atrial Fibrillation. INR on 6/20 was 2.8 and last dose was on 6/24.     Glipizide: Facility MAR has glipizide 10mg BID and glipizide 5mg listed -- however last dose for glipizide 5mg was not noted on MAR.     Last doses for PRN meds and gentamicin, Vusion, triamcinolone, and urea are not listed on MAR and are unknown.     Prior to Admission medications    Medication Sig Last Dose Taking? Auth Provider   acetaminophen (TYLENOL) 500 MG tablet Take 1,000 mg by mouth every 6 hours as needed   at PRN Med Yes Reported, Patient   albuterol (2.5 MG/3ML) 0.083% neb solution Take 2.5 mg by nebulization every 6 hours as needed  at PRN med Yes Reported, Patient   furosemide (LASIX) 40 MG tablet Take 40 mg by mouth daily 6/25/2018 at 0800 Yes Reported, Patient   furosemide (LASIX) 40 MG tablet Take 20 mg by mouth daily as needed Extra dose at noon each day if weight > 2 lbs in 24 h  at PRN med Yes Reported, Patient   gentamicin (GARAMYCIN) 0.1 % ointment Apply 1 Application topically daily  at Unknown Yes Reported, Patient   glipiZIDE (GLUCOTROL) 5 MG tablet Take 10 mg by mouth 2 times daily  6/24/2018 at 2000 Yes Reported, Patient   GLIPIZIDE PO Take  5 mg by mouth daily  at Unknown Yes Unknown, Entered By History   lisinopril (PRINIVIL/ZESTRIL) 10 MG tablet Take 10 mg by mouth daily 6/25/2018 at 0800 Yes Reported, Patient   loperamide (IMODIUM) 2 MG capsule Take 2 mg by mouth 3 times daily as needed  at PRN med Yes Reported, Patient   metoprolol succinate (TOPROL-XL) 50 MG 24 hr tablet Take 50 mg by mouth 2 times daily 6/25/2018 at 0800 Yes Reported, Patient   Miconazole-Zinc Oxide-Petrolat (VUSION) 0.25-15-81.35 % OINT Externally apply topically 2 times daily  at Unknown Yes Unknown, Entered By History   nitroGLYcerin (NITROSTAT) 0.4 MG sublingual tablet Place 0.4 mg under the tongue every 5 minutes as needed  at PRN med Yes Reported, Patient   omeprazole (PRILOSEC) 20 MG CR capsule Take 20 mg by mouth daily 6/25/2018 at 0800 Yes Reported, Patient   simvastatin (ZOCOR) 40 MG tablet Take 40 mg by mouth At Bedtime 6/24/2018 at 2000 Yes Reported, Patient   spironolactone (ALDACTONE) 25 MG tablet Take 25 mg by mouth daily 6/25/2018 at Unknown time Yes Reported, Patient   triamcinolone (KENALOG) 0.1 % cream Apply 1 Application topically daily  at Unknown Yes Reported, Patient   urea (CARMOL) 10 % cream Apply 1 Application topically daily as needed  at Unknown Yes Reported, Patient   warfarin (COUMADIN) 10 MG tablet Take 1.5 mg by mouth daily  6/24/2018 at 1600 Yes Reported, Patient     Medication history completed by: Tabby Stephens, PharmD IV Student

## 2018-06-26 NOTE — PHARMACY-ANTICOAGULATION SERVICE
Clinical Pharmacy - Warfarin Dosing Consult     Pharmacy has been consulted to manage this patient s warfarin therapy.  Indication: Atrial Fibrillation  Therapy Goal: INR 2-3  OP Anticoag Clinic: [facility]    Warfarin Prior to Admission: Yes  Warfarin PTA Regimen: Take 1.5mg by mouth daily  Significant drug interactions: Levofloxacin and Simvastatin (increased risk of bleeding)  Recent documented change in oral intake/nutrition: Unknown  Dose Comments: Per [facility], INR on 6/20 was 2.8 and last dose was on 6/24    INR   Date Value Ref Range Status   06/26/2018 2.57 (H) 0.86 - 1.14 Final   06/25/2018 2.42 (H) 0.86 - 1.14 Final       Recommend warfarin 1.5 mg today.  Pharmacy will monitor Ever Crane daily and order warfarin doses to achieve specified goal.      Please contact pharmacy as soon as possible if the warfarin needs to be held for a procedure or if the warfarin goals change.      Tabby Stephens, PharmD IV Student
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
## 2018-06-26 NOTE — PLAN OF CARE
Problem: Patient Care Overview  Goal: Discharge Needs Assessment  Dyspnea improved and oxygen saturations greater than 88% on room air or prior home oxygen levels :Patient denied sob,oxygen sat 96% on room air  - Tolerates oral antibiotics or has plans for home infusion set up. Not met,pt not on IV antibiotics and no set up for home infusion ordered.  - Vital signs normal or at patient baseline : Not met,still having elevated temp,last temp 100.8,other vitals are stable.  - Infection is improving:This is on going   - Return to baseline functional status Not met,pt working with PT.  - Safe disposition plan has been identified :yes.  - Nurse to notify provider when observation goals have been met and patient is ready for discharge.

## 2018-06-27 ENCOUNTER — APPOINTMENT (OUTPATIENT)
Dept: PHYSICAL THERAPY | Facility: CLINIC | Age: 73
DRG: 871 | End: 2018-06-27
Attending: NURSE PRACTITIONER
Payer: MEDICARE

## 2018-06-27 ENCOUNTER — APPOINTMENT (OUTPATIENT)
Dept: GENERAL RADIOLOGY | Facility: CLINIC | Age: 73
DRG: 871 | End: 2018-06-27
Attending: NURSE PRACTITIONER
Payer: MEDICARE

## 2018-06-27 ENCOUNTER — APPOINTMENT (OUTPATIENT)
Dept: ULTRASOUND IMAGING | Facility: CLINIC | Age: 73
DRG: 871 | End: 2018-06-27
Attending: NURSE PRACTITIONER
Payer: MEDICARE

## 2018-06-27 LAB
ANION GAP SERPL CALCULATED.3IONS-SCNC: 9 MMOL/L (ref 3–14)
ANION GAP SERPL CALCULATED.3IONS-SCNC: 9 MMOL/L (ref 3–14)
BASOPHILS # BLD AUTO: 0 10E9/L (ref 0–0.2)
BASOPHILS # BLD AUTO: 0 10E9/L (ref 0–0.2)
BASOPHILS NFR BLD AUTO: 0.1 %
BASOPHILS NFR BLD AUTO: 0.2 %
BUN SERPL-MCNC: 21 MG/DL (ref 7–30)
BUN SERPL-MCNC: 24 MG/DL (ref 7–30)
CALCIUM SERPL-MCNC: 8.5 MG/DL (ref 8.5–10.1)
CALCIUM SERPL-MCNC: 8.7 MG/DL (ref 8.5–10.1)
CHLORIDE SERPL-SCNC: 105 MMOL/L (ref 94–109)
CHLORIDE SERPL-SCNC: 106 MMOL/L (ref 94–109)
CO2 SERPL-SCNC: 19 MMOL/L (ref 20–32)
CO2 SERPL-SCNC: 22 MMOL/L (ref 20–32)
CREAT SERPL-MCNC: 1.17 MG/DL (ref 0.66–1.25)
CREAT SERPL-MCNC: 1.21 MG/DL (ref 0.66–1.25)
CRP SERPL-MCNC: 150 MG/L (ref 0–8)
DIFFERENTIAL METHOD BLD: ABNORMAL
DIFFERENTIAL METHOD BLD: ABNORMAL
EOSINOPHIL # BLD AUTO: 0.1 10E9/L (ref 0–0.7)
EOSINOPHIL # BLD AUTO: 0.1 10E9/L (ref 0–0.7)
EOSINOPHIL NFR BLD AUTO: 0.6 %
EOSINOPHIL NFR BLD AUTO: 0.9 %
ERYTHROCYTE [DISTWIDTH] IN BLOOD BY AUTOMATED COUNT: 14.1 % (ref 10–15)
ERYTHROCYTE [DISTWIDTH] IN BLOOD BY AUTOMATED COUNT: 14.1 % (ref 10–15)
ERYTHROCYTE [SEDIMENTATION RATE] IN BLOOD BY WESTERGREN METHOD: 50 MM/H (ref 0–20)
GFR SERPL CREATININE-BSD FRML MDRD: 59 ML/MIN/1.7M2
GFR SERPL CREATININE-BSD FRML MDRD: 61 ML/MIN/1.7M2
GLUCOSE BLDC GLUCOMTR-MCNC: 122 MG/DL (ref 70–99)
GLUCOSE BLDC GLUCOMTR-MCNC: 152 MG/DL (ref 70–99)
GLUCOSE BLDC GLUCOMTR-MCNC: 158 MG/DL (ref 70–99)
GLUCOSE BLDC GLUCOMTR-MCNC: 161 MG/DL (ref 70–99)
GLUCOSE SERPL-MCNC: 152 MG/DL (ref 70–99)
GLUCOSE SERPL-MCNC: 168 MG/DL (ref 70–99)
HCT VFR BLD AUTO: 31.5 % (ref 40–53)
HCT VFR BLD AUTO: 31.7 % (ref 40–53)
HGB BLD-MCNC: 10.2 G/DL (ref 13.3–17.7)
HGB BLD-MCNC: 10.6 G/DL (ref 13.3–17.7)
IMM GRANULOCYTES # BLD: 0 10E9/L (ref 0–0.4)
IMM GRANULOCYTES # BLD: 0 10E9/L (ref 0–0.4)
IMM GRANULOCYTES NFR BLD: 0.2 %
IMM GRANULOCYTES NFR BLD: 0.2 %
INR PPP: 2.06 (ref 0.86–1.14)
LACTATE BLD-SCNC: 1.4 MMOL/L (ref 0.4–1.9)
LYMPHOCYTES # BLD AUTO: 0.8 10E9/L (ref 0.8–5.3)
LYMPHOCYTES # BLD AUTO: 0.9 10E9/L (ref 0.8–5.3)
LYMPHOCYTES NFR BLD AUTO: 7.5 %
LYMPHOCYTES NFR BLD AUTO: 7.6 %
MCH RBC QN AUTO: 28.2 PG (ref 26.5–33)
MCH RBC QN AUTO: 29 PG (ref 26.5–33)
MCHC RBC AUTO-ENTMCNC: 32.4 G/DL (ref 31.5–36.5)
MCHC RBC AUTO-ENTMCNC: 33.4 G/DL (ref 31.5–36.5)
MCV RBC AUTO: 87 FL (ref 78–100)
MCV RBC AUTO: 87 FL (ref 78–100)
MONOCYTES # BLD AUTO: 0.6 10E9/L (ref 0–1.3)
MONOCYTES # BLD AUTO: 0.7 10E9/L (ref 0–1.3)
MONOCYTES NFR BLD AUTO: 5.1 %
MONOCYTES NFR BLD AUTO: 5.9 %
NEUTROPHILS # BLD AUTO: 10.5 10E9/L (ref 1.6–8.3)
NEUTROPHILS # BLD AUTO: 9.6 10E9/L (ref 1.6–8.3)
NEUTROPHILS NFR BLD AUTO: 85.4 %
NEUTROPHILS NFR BLD AUTO: 86.3 %
NRBC # BLD AUTO: 0 10*3/UL
NRBC # BLD AUTO: 0 10*3/UL
NRBC BLD AUTO-RTO: 0 /100
NRBC BLD AUTO-RTO: 0 /100
NT-PROBNP SERPL-MCNC: 4514 PG/ML (ref 0–900)
PLATELET # BLD AUTO: 89 10E9/L (ref 150–450)
PLATELET # BLD AUTO: 93 10E9/L (ref 150–450)
POTASSIUM SERPL-SCNC: 4.3 MMOL/L (ref 3.4–5.3)
POTASSIUM SERPL-SCNC: 5 MMOL/L (ref 3.4–5.3)
PROCALCITONIN SERPL-MCNC: 0.33 NG/ML
RBC # BLD AUTO: 3.62 10E12/L (ref 4.4–5.9)
RBC # BLD AUTO: 3.66 10E12/L (ref 4.4–5.9)
SODIUM SERPL-SCNC: 135 MMOL/L (ref 133–144)
SODIUM SERPL-SCNC: 136 MMOL/L (ref 133–144)
WBC # BLD AUTO: 11.3 10E9/L (ref 4–11)
WBC # BLD AUTO: 12.2 10E9/L (ref 4–11)

## 2018-06-27 PROCEDURE — 83605 ASSAY OF LACTIC ACID: CPT | Performed by: EMERGENCY MEDICINE

## 2018-06-27 PROCEDURE — 25000132 ZZH RX MED GY IP 250 OP 250 PS 637: Mod: GY | Performed by: PHYSICIAN ASSISTANT

## 2018-06-27 PROCEDURE — 80048 BASIC METABOLIC PNL TOTAL CA: CPT | Performed by: NURSE PRACTITIONER

## 2018-06-27 PROCEDURE — 36415 COLL VENOUS BLD VENIPUNCTURE: CPT | Performed by: NURSE PRACTITIONER

## 2018-06-27 PROCEDURE — A9270 NON-COVERED ITEM OR SERVICE: HCPCS | Mod: GY

## 2018-06-27 PROCEDURE — 97116 GAIT TRAINING THERAPY: CPT | Mod: GP | Performed by: PHYSICAL THERAPIST

## 2018-06-27 PROCEDURE — 96372 THER/PROPH/DIAG INJ SC/IM: CPT

## 2018-06-27 PROCEDURE — 84145 PROCALCITONIN (PCT): CPT | Performed by: NURSE PRACTITIONER

## 2018-06-27 PROCEDURE — 99226 ZZC SUBSEQUENT OBSERVATION CARE,LEVEL III: CPT | Performed by: PEDIATRICS

## 2018-06-27 PROCEDURE — 25000128 H RX IP 250 OP 636: Performed by: NURSE PRACTITIONER

## 2018-06-27 PROCEDURE — 99217 ZZC OBSERVATION CARE DISCHARGE: CPT | Mod: Z6 | Performed by: EMERGENCY MEDICINE

## 2018-06-27 PROCEDURE — 00000146 ZZHCL STATISTIC GLUCOSE BY METER IP

## 2018-06-27 PROCEDURE — 80048 BASIC METABOLIC PNL TOTAL CA: CPT | Performed by: PHYSICIAN ASSISTANT

## 2018-06-27 PROCEDURE — 85025 COMPLETE CBC W/AUTO DIFF WBC: CPT | Performed by: PHYSICIAN ASSISTANT

## 2018-06-27 PROCEDURE — 97530 THERAPEUTIC ACTIVITIES: CPT | Mod: GP | Performed by: PHYSICAL THERAPIST

## 2018-06-27 PROCEDURE — G0378 HOSPITAL OBSERVATION PER HR: HCPCS

## 2018-06-27 PROCEDURE — 25000128 H RX IP 250 OP 636: Performed by: PHYSICIAN ASSISTANT

## 2018-06-27 PROCEDURE — 83880 ASSAY OF NATRIURETIC PEPTIDE: CPT | Performed by: NURSE PRACTITIONER

## 2018-06-27 PROCEDURE — 71046 X-RAY EXAM CHEST 2 VIEWS: CPT

## 2018-06-27 PROCEDURE — 25000128 H RX IP 250 OP 636

## 2018-06-27 PROCEDURE — 93971 EXTREMITY STUDY: CPT | Mod: RT

## 2018-06-27 PROCEDURE — A9270 NON-COVERED ITEM OR SERVICE: HCPCS | Mod: GY | Performed by: NURSE PRACTITIONER

## 2018-06-27 PROCEDURE — 96367 TX/PROPH/DG ADDL SEQ IV INF: CPT

## 2018-06-27 PROCEDURE — 99207 ZZC APP CREDIT; MD BILLING SHARED VISIT: CPT | Performed by: PHYSICIAN ASSISTANT

## 2018-06-27 PROCEDURE — 85610 PROTHROMBIN TIME: CPT | Performed by: PHYSICIAN ASSISTANT

## 2018-06-27 PROCEDURE — 25000132 ZZH RX MED GY IP 250 OP 250 PS 637: Mod: GY

## 2018-06-27 PROCEDURE — 96366 THER/PROPH/DIAG IV INF ADDON: CPT

## 2018-06-27 PROCEDURE — 87040 BLOOD CULTURE FOR BACTERIA: CPT | Performed by: NURSE PRACTITIONER

## 2018-06-27 PROCEDURE — 85652 RBC SED RATE AUTOMATED: CPT | Performed by: NURSE PRACTITIONER

## 2018-06-27 PROCEDURE — 97162 PT EVAL MOD COMPLEX 30 MIN: CPT | Mod: GP | Performed by: PHYSICAL THERAPIST

## 2018-06-27 PROCEDURE — 25000132 ZZH RX MED GY IP 250 OP 250 PS 637: Mod: GY | Performed by: NURSE PRACTITIONER

## 2018-06-27 PROCEDURE — 36415 COLL VENOUS BLD VENIPUNCTURE: CPT | Performed by: PHYSICIAN ASSISTANT

## 2018-06-27 PROCEDURE — 86140 C-REACTIVE PROTEIN: CPT | Performed by: PHYSICIAN ASSISTANT

## 2018-06-27 PROCEDURE — 40000193 ZZH STATISTIC PT WARD VISIT: Performed by: PHYSICAL THERAPIST

## 2018-06-27 PROCEDURE — A9270 NON-COVERED ITEM OR SERVICE: HCPCS | Mod: GY | Performed by: PHYSICIAN ASSISTANT

## 2018-06-27 PROCEDURE — 80076 HEPATIC FUNCTION PANEL: CPT | Performed by: NURSE PRACTITIONER

## 2018-06-27 PROCEDURE — 85025 COMPLETE CBC W/AUTO DIFF WBC: CPT | Performed by: NURSE PRACTITIONER

## 2018-06-27 RX ORDER — IPRATROPIUM BROMIDE AND ALBUTEROL SULFATE 2.5; .5 MG/3ML; MG/3ML
3 SOLUTION RESPIRATORY (INHALATION) ONCE
Status: DISCONTINUED | OUTPATIENT
Start: 2018-06-27 | End: 2018-06-30 | Stop reason: HOSPADM

## 2018-06-27 RX ORDER — WARFARIN SODIUM 2 MG/1
2 TABLET ORAL
Status: COMPLETED | OUTPATIENT
Start: 2018-06-27 | End: 2018-06-27

## 2018-06-27 RX ADMIN — INSULIN ASPART 1 UNITS: 100 INJECTION, SOLUTION INTRAVENOUS; SUBCUTANEOUS at 08:04

## 2018-06-27 RX ADMIN — SIMVASTATIN 40 MG: 20 TABLET, FILM COATED ORAL at 22:47

## 2018-06-27 RX ADMIN — OMEPRAZOLE 20 MG: 20 CAPSULE, DELAYED RELEASE ORAL at 07:58

## 2018-06-27 RX ADMIN — SODIUM CHLORIDE: 9 INJECTION, SOLUTION INTRAVENOUS at 04:54

## 2018-06-27 RX ADMIN — INSULIN ASPART 1 UNITS: 100 INJECTION, SOLUTION INTRAVENOUS; SUBCUTANEOUS at 12:12

## 2018-06-27 RX ADMIN — SODIUM CHLORIDE: 9 INJECTION, SOLUTION INTRAVENOUS at 17:52

## 2018-06-27 RX ADMIN — VANCOMYCIN HYDROCHLORIDE 1500 MG: 10 INJECTION, POWDER, LYOPHILIZED, FOR SOLUTION INTRAVENOUS at 23:31

## 2018-06-27 RX ADMIN — ACETAMINOPHEN 1000 MG: 500 TABLET, FILM COATED ORAL at 20:41

## 2018-06-27 RX ADMIN — LISINOPRIL 10 MG: 10 TABLET ORAL at 07:57

## 2018-06-27 RX ADMIN — METOPROLOL SUCCINATE 50 MG: 50 TABLET, EXTENDED RELEASE ORAL at 07:57

## 2018-06-27 RX ADMIN — MICONAZOLE NITRATE: 20 CREAM TOPICAL at 19:50

## 2018-06-27 RX ADMIN — CEFEPIME HYDROCHLORIDE 2 G: 2 INJECTION, POWDER, FOR SOLUTION INTRAVENOUS at 22:46

## 2018-06-27 RX ADMIN — METOPROLOL SUCCINATE 50 MG: 50 TABLET, EXTENDED RELEASE ORAL at 19:49

## 2018-06-27 RX ADMIN — Medication 1 MG: at 03:15

## 2018-06-27 RX ADMIN — MICONAZOLE NITRATE: 20 CREAM TOPICAL at 08:04

## 2018-06-27 RX ADMIN — LEVOFLOXACIN 750 MG: 5 INJECTION, SOLUTION INTRAVENOUS at 07:57

## 2018-06-27 RX ADMIN — MICONAZOLE NITRATE: 2 POWDER TOPICAL at 17:51

## 2018-06-27 RX ADMIN — WARFARIN SODIUM 2 MG: 2 TABLET ORAL at 17:52

## 2018-06-27 ASSESSMENT — PAIN DESCRIPTION - DESCRIPTORS: DESCRIPTORS: SORE
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
## 2018-06-27 NOTE — PLAN OF CARE
Problem: Patient Care Overview  Goal: Plan of Care/Patient Progress Review  - Dyspnea improved and oxygen saturations greater than 88% on room air or prior home oxygen levels- YES  - Tolerates oral antibiotics or has plans for home infusion set up. NO: Pt still on IV abx  - Vital signs normal or at patient baseline - Yes  - Infection is improving PENDING  - Return to baseline functional status-  NO  - Safe disposition plan has been identified -YES  - Nurse to notify provider when observation goals have been met and patient is ready for discharge.  /73 (BP Location: Left arm)  Pulse 85  Temp 99.8  F (37.7  C) (Oral)  Resp 17  Ht 1.829 m (6')  Wt 88.5 kg (195 lb)  SpO2 95%  BMI 26.45 kg/m2

## 2018-06-27 NOTE — PLAN OF CARE
Problem: Patient Care Overview  Goal: Plan of Care/Patient Progress Review  - Dyspnea improved and oxygen saturations greater than 88% on room air or prior home oxygen levels- YES  - Tolerates oral antibiotics or has plans for home infusion set up. NO: Pt still on IV abx  - Vital signs normal or at patient baseline - Yes  - Infection is improving PENDING  - Return to baseline functional status NO: pt is working with PT  - Safe disposition plan has been identified YES  - Nurse to notify provider when observation goals have been met and patient is ready for discharge.  /67 (BP Location: Left arm)  Pulse 85  Temp 97.8  F (36.6  C) (Oral)  Resp 18  Ht 1.829 m (6')  Wt 88.5 kg (195 lb)  SpO2 95%  BMI 26.45 kg/m2    .
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
## 2018-06-27 NOTE — PLAN OF CARE
"Problem: Patient Care Overview  Goal: Plan of Care/Patient Progress Review  PT 6D: PT evaluation completed, treatment indicated.   Discharge Planner PT   Patient plan for discharge: pt with preference to return to LTC, also agreeable to [facility] if indicated  Current status: pt needs min assist for bed mobility, CGA for sit to stand transfers, and CGA-min assist for gait x 150ft without AD. Pt very SOB with exertion however SpO2 99% on room air. Pt reports fatigue with mobility, alert and oriented although reports continued feelings of mild confusion or feeling \"off.\"   Barriers to return to prior living situation: level of assist available at detention, pt needing CGA for safe mobility, deconditioning   Recommendations for discharge: LTC vs detention pending level of assist available at detention  Rationale for recommendations: pt needing CGA-min assist of 1 person for safe mobility at this time, ambulating short distances on room air with vitals stable. If detention able to provide assist for mobility initially and progress pt's mobility with continued HHPT, detention would be indicated. If detention unable to provide this level of care, recommend that pt return to LTC facility for a short time until able to ambulate independently.        Entered by: Char Teague 06/27/2018 10:51 AM           "

## 2018-06-27 NOTE — PLAN OF CARE
Problem: Patient Care Overview  Goal: Plan of Care/Patient Progress Review  Outcome: No Change  Observation goals PRIOR TO DISCHARGE     Comments: List all goals to be met before discharge home:   - Dyspnea improved and oxygen saturations greater than 88% on room air or prior home oxygen levels YES  - Tolerates oral antibiotics or has plans for home infusion set up. NO: Pt still on IV abx  - Vital signs normal or at patient baseline NO: Pt has a low grade fever of 99.8  - Infection is improving PENDING  - Return to baseline functional status NO: pt is working with PT  - Safe disposition plan has been identified YES  - Nurse to notify provider when observation goals have been met and patient is ready for discharge.

## 2018-06-27 NOTE — PROGRESS NOTES
Patient remained stable through shift. VSS, alert and oriented however slow to answer questions at times. Temp increasing to 100.5F. Voids, BM x1. No pain. Assist of 2 with gait belt to bathroom. Will continue to monitor.

## 2018-06-27 NOTE — PLAN OF CARE
Problem: Patient Care Overview  Goal: Plan of Care/Patient Progress Review  Outcome: No Change  Observation goals PRIOR TO DISCHARGE     Comments: List all goals to be met before discharge home:   - Dyspnea improved and oxygen saturations greater than 88% on room air or prior home oxygen levels YES  - Tolerates oral antibiotics or has plans for home infusion set up. NO: Pt still on IV abx  - Vital signs normal or at patient baseline NO: Pt has a fever of  100.5  - Infection is improving PENDING  - Return to baseline functional status NO: pt is working with PT  - Safe disposition plan has been identified YES  - Nurse to notify provider when observation goals have been met and patient is ready for discharge.     Pt is alert but is disoriented to situation. Pt is on a bed alarm in bed, and is ambulating with an assist of 2 and a gait belt. Pt is tolerating a CHO diet and is denying SOB. Nurse will continue to monitor.

## 2018-06-27 NOTE — PLAN OF CARE
Problem: Patient Care Overview  Goal: Plan of Care/Patient Progress Review  - Dyspnea improved and oxygen saturations greater than 88% on room air or prior home oxygen levels YES  - Tolerates oral antibiotics or has plans for home infusion set up. NO: Pt still on IV abx  - Vital signs normal or at patient baseline NO: Pt has a low grade fever of 99.8  - Infection is improving PENDING  - Return to baseline functional status NO: pt is working with PT  - Safe disposition plan has been identified YES  - Nurse to notify provider when observation goals have been met and patient is ready for discharge.  .

## 2018-06-28 ENCOUNTER — APPOINTMENT (OUTPATIENT)
Dept: GENERAL RADIOLOGY | Facility: CLINIC | Age: 73
DRG: 871 | End: 2018-06-28
Attending: PHYSICIAN ASSISTANT
Payer: MEDICARE

## 2018-06-28 ENCOUNTER — ALLIED HEALTH/NURSE VISIT (OUTPATIENT)
Dept: NEUROLOGY | Facility: CLINIC | Age: 73
DRG: 871 | End: 2018-06-28
Attending: PSYCHIATRY & NEUROLOGY
Payer: MEDICARE

## 2018-06-28 ENCOUNTER — APPOINTMENT (OUTPATIENT)
Dept: ULTRASOUND IMAGING | Facility: CLINIC | Age: 73
DRG: 871 | End: 2018-06-28
Attending: PHYSICIAN ASSISTANT
Payer: MEDICARE

## 2018-06-28 DIAGNOSIS — R41.82 ALTERED MENTAL STATUS: Primary | ICD-10-CM

## 2018-06-28 PROBLEM — R50.9 FEVER: Status: ACTIVE | Noted: 2018-06-28

## 2018-06-28 LAB
ALBUMIN SERPL-MCNC: 2.7 G/DL (ref 3.4–5)
ALP SERPL-CCNC: 74 U/L (ref 40–150)
ALT SERPL W P-5'-P-CCNC: 16 U/L (ref 0–70)
ANION GAP SERPL CALCULATED.3IONS-SCNC: 11 MMOL/L (ref 3–14)
AST SERPL W P-5'-P-CCNC: 26 U/L (ref 0–45)
BASE DEFICIT BLDV-SCNC: 3.5 MMOL/L
BILIRUB DIRECT SERPL-MCNC: 0.3 MG/DL (ref 0–0.2)
BILIRUB DIRECT SERPL-MCNC: 0.4 MG/DL (ref 0–0.2)
BILIRUB SERPL-MCNC: 1.4 MG/DL (ref 0.2–1.3)
BUN SERPL-MCNC: 22 MG/DL (ref 7–30)
CALCIUM SERPL-MCNC: 8.6 MG/DL (ref 8.5–10.1)
CHLORIDE SERPL-SCNC: 104 MMOL/L (ref 94–109)
CK SERPL-CCNC: 231 U/L (ref 30–300)
CO2 SERPL-SCNC: 21 MMOL/L (ref 20–32)
CREAT SERPL-MCNC: 1.13 MG/DL (ref 0.66–1.25)
CRP SERPL-MCNC: 160 MG/L (ref 0–8)
ERYTHROCYTE [DISTWIDTH] IN BLOOD BY AUTOMATED COUNT: 14 % (ref 10–15)
GFR SERPL CREATININE-BSD FRML MDRD: 64 ML/MIN/1.7M2
GLUCOSE BLDC GLUCOMTR-MCNC: 147 MG/DL (ref 70–99)
GLUCOSE BLDC GLUCOMTR-MCNC: 159 MG/DL (ref 70–99)
GLUCOSE BLDC GLUCOMTR-MCNC: 164 MG/DL (ref 70–99)
GLUCOSE BLDC GLUCOMTR-MCNC: 188 MG/DL (ref 70–99)
GLUCOSE SERPL-MCNC: 170 MG/DL (ref 70–99)
HCO3 BLDV-SCNC: 21 MMOL/L (ref 21–28)
HCT VFR BLD AUTO: 30.9 % (ref 40–53)
HGB BLD-MCNC: 10.5 G/DL (ref 13.3–17.7)
INR PPP: 1.72 (ref 0.86–1.14)
INTERPRETATION ECG - MUSE: NORMAL
LDH SERPL L TO P-CCNC: 142 U/L (ref 85–227)
MCH RBC QN AUTO: 28.8 PG (ref 26.5–33)
MCHC RBC AUTO-ENTMCNC: 34 G/DL (ref 31.5–36.5)
MCV RBC AUTO: 85 FL (ref 78–100)
NT-PROBNP SERPL-MCNC: 4872 PG/ML (ref 0–900)
O2/TOTAL GAS SETTING VFR VENT: 21 %
PCO2 BLDV: 37 MM HG (ref 40–50)
PH BLDV: 7.37 PH (ref 7.32–7.43)
PLATELET # BLD AUTO: 89 10E9/L (ref 150–450)
PO2 BLDV: 39 MM HG (ref 25–47)
POTASSIUM SERPL-SCNC: 3.9 MMOL/L (ref 3.4–5.3)
PROCALCITONIN SERPL-MCNC: 0.37 NG/ML
PROT SERPL-MCNC: 7.3 G/DL (ref 6.8–8.8)
RADIOLOGIST FLAGS: NORMAL
RBC # BLD AUTO: 3.65 10E12/L (ref 4.4–5.9)
SODIUM SERPL-SCNC: 136 MMOL/L (ref 133–144)
T PALLIDUM AB SER QL: NONREACTIVE
WBC # BLD AUTO: 9.2 10E9/L (ref 4–11)

## 2018-06-28 PROCEDURE — 25000128 H RX IP 250 OP 636: Performed by: PHYSICIAN ASSISTANT

## 2018-06-28 PROCEDURE — 25000132 ZZH RX MED GY IP 250 OP 250 PS 637: Mod: GY | Performed by: PHYSICIAN ASSISTANT

## 2018-06-28 PROCEDURE — 82550 ASSAY OF CK (CPK): CPT | Performed by: PHYSICIAN ASSISTANT

## 2018-06-28 PROCEDURE — 80048 BASIC METABOLIC PNL TOTAL CA: CPT | Performed by: PHYSICIAN ASSISTANT

## 2018-06-28 PROCEDURE — 82248 BILIRUBIN DIRECT: CPT | Performed by: PHYSICIAN ASSISTANT

## 2018-06-28 PROCEDURE — 87103 BLOOD FUNGUS CULTURE: CPT | Performed by: PHYSICIAN ASSISTANT

## 2018-06-28 PROCEDURE — 96375 TX/PRO/DX INJ NEW DRUG ADDON: CPT

## 2018-06-28 PROCEDURE — 99221 1ST HOSP IP/OBS SF/LOW 40: CPT | Performed by: INTERNAL MEDICINE

## 2018-06-28 PROCEDURE — 93005 ELECTROCARDIOGRAM TRACING: CPT

## 2018-06-28 PROCEDURE — 84145 PROCALCITONIN (PCT): CPT | Performed by: PHYSICIAN ASSISTANT

## 2018-06-28 PROCEDURE — 85027 COMPLETE CBC AUTOMATED: CPT | Performed by: PHYSICIAN ASSISTANT

## 2018-06-28 PROCEDURE — 83615 LACTATE (LD) (LDH) ENZYME: CPT | Performed by: PHYSICIAN ASSISTANT

## 2018-06-28 PROCEDURE — 95951 ZZHC EEG VIDEO EACH 24 HR: CPT | Mod: ZF

## 2018-06-28 PROCEDURE — A9270 NON-COVERED ITEM OR SERVICE: HCPCS | Mod: GY | Performed by: PHYSICIAN ASSISTANT

## 2018-06-28 PROCEDURE — 74018 RADEX ABDOMEN 1 VIEW: CPT

## 2018-06-28 PROCEDURE — 85610 PROTHROMBIN TIME: CPT | Performed by: PHYSICIAN ASSISTANT

## 2018-06-28 PROCEDURE — 36415 COLL VENOUS BLD VENIPUNCTURE: CPT | Performed by: PHYSICIAN ASSISTANT

## 2018-06-28 PROCEDURE — 25000128 H RX IP 250 OP 636: Performed by: NURSE PRACTITIONER

## 2018-06-28 PROCEDURE — 76705 ECHO EXAM OF ABDOMEN: CPT

## 2018-06-28 PROCEDURE — 25000128 H RX IP 250 OP 636

## 2018-06-28 PROCEDURE — 00000146 ZZHCL STATISTIC GLUCOSE BY METER IP

## 2018-06-28 PROCEDURE — 99233 SBSQ HOSP IP/OBS HIGH 50: CPT | Performed by: INTERNAL MEDICINE

## 2018-06-28 PROCEDURE — 12000001 ZZH R&B MED SURG/OB UMMC

## 2018-06-28 PROCEDURE — 86780 TREPONEMA PALLIDUM: CPT | Performed by: PHYSICIAN ASSISTANT

## 2018-06-28 PROCEDURE — 99207 ZZC APP CREDIT; MD BILLING SHARED VISIT: CPT | Performed by: PHYSICIAN ASSISTANT

## 2018-06-28 PROCEDURE — 93010 ELECTROCARDIOGRAM REPORT: CPT | Performed by: INTERNAL MEDICINE

## 2018-06-28 PROCEDURE — 84425 ASSAY OF VITAMIN B-1: CPT | Performed by: PHYSICIAN ASSISTANT

## 2018-06-28 PROCEDURE — 96366 THER/PROPH/DIAG IV INF ADDON: CPT

## 2018-06-28 PROCEDURE — 82803 BLOOD GASES ANY COMBINATION: CPT | Performed by: PHYSICIAN ASSISTANT

## 2018-06-28 PROCEDURE — A9270 NON-COVERED ITEM OR SERVICE: HCPCS | Mod: GY

## 2018-06-28 PROCEDURE — 25000132 ZZH RX MED GY IP 250 OP 250 PS 637: Mod: GY

## 2018-06-28 PROCEDURE — 86140 C-REACTIVE PROTEIN: CPT | Performed by: PHYSICIAN ASSISTANT

## 2018-06-28 PROCEDURE — G0378 HOSPITAL OBSERVATION PER HR: HCPCS

## 2018-06-28 PROCEDURE — 83880 ASSAY OF NATRIURETIC PEPTIDE: CPT | Performed by: PHYSICIAN ASSISTANT

## 2018-06-28 RX ORDER — FUROSEMIDE 10 MG/ML
40 INJECTION INTRAMUSCULAR; INTRAVENOUS ONCE
Status: COMPLETED | OUTPATIENT
Start: 2018-06-28 | End: 2018-06-28

## 2018-06-28 RX ORDER — LISINOPRIL 10 MG/1
10 TABLET ORAL DAILY
Status: DISCONTINUED | OUTPATIENT
Start: 2018-06-29 | End: 2018-06-30 | Stop reason: HOSPADM

## 2018-06-28 RX ORDER — SPIRONOLACTONE 25 MG/1
25 TABLET ORAL DAILY
Status: DISCONTINUED | OUTPATIENT
Start: 2018-06-29 | End: 2018-06-30 | Stop reason: HOSPADM

## 2018-06-28 RX ORDER — WARFARIN SODIUM 2.5 MG/1
2.5 TABLET ORAL
Status: COMPLETED | OUTPATIENT
Start: 2018-06-28 | End: 2018-06-28

## 2018-06-28 RX ADMIN — VANCOMYCIN HYDROCHLORIDE 1500 MG: 10 INJECTION, POWDER, LYOPHILIZED, FOR SOLUTION INTRAVENOUS at 12:34

## 2018-06-28 RX ADMIN — WARFARIN SODIUM 2.5 MG: 2.5 TABLET ORAL at 17:30

## 2018-06-28 RX ADMIN — METOPROLOL SUCCINATE 50 MG: 50 TABLET, EXTENDED RELEASE ORAL at 20:15

## 2018-06-28 RX ADMIN — THIAMINE HYDROCHLORIDE 500 MG: 100 INJECTION, SOLUTION INTRAMUSCULAR; INTRAVENOUS at 20:44

## 2018-06-28 RX ADMIN — CEFEPIME HYDROCHLORIDE 2 G: 2 INJECTION, POWDER, FOR SOLUTION INTRAVENOUS at 14:32

## 2018-06-28 RX ADMIN — MICONAZOLE NITRATE: 20 CREAM TOPICAL at 20:17

## 2018-06-28 RX ADMIN — MICONAZOLE NITRATE: 2 POWDER TOPICAL at 14:34

## 2018-06-28 RX ADMIN — THIAMINE HYDROCHLORIDE 500 MG: 100 INJECTION, SOLUTION INTRAMUSCULAR; INTRAVENOUS at 12:35

## 2018-06-28 RX ADMIN — FUROSEMIDE 40 MG: 10 INJECTION, SOLUTION INTRAVENOUS at 00:29

## 2018-06-28 RX ADMIN — OMEPRAZOLE 20 MG: 20 CAPSULE, DELAYED RELEASE ORAL at 07:43

## 2018-06-28 RX ADMIN — CEFEPIME HYDROCHLORIDE 2 G: 2 INJECTION, POWDER, FOR SOLUTION INTRAVENOUS at 05:37

## 2018-06-28 RX ADMIN — THIAMINE HYDROCHLORIDE 500 MG: 100 INJECTION, SOLUTION INTRAMUSCULAR; INTRAVENOUS at 01:37

## 2018-06-28 RX ADMIN — METOPROLOL SUCCINATE 50 MG: 50 TABLET, EXTENDED RELEASE ORAL at 07:43

## 2018-06-28 RX ADMIN — INSULIN ASPART 1 UNITS: 100 INJECTION, SOLUTION INTRAVENOUS; SUBCUTANEOUS at 17:30

## 2018-06-28 RX ADMIN — FUROSEMIDE 40 MG: 10 INJECTION, SOLUTION INTRAVENOUS at 15:09

## 2018-06-28 RX ADMIN — MICONAZOLE NITRATE: 20 CREAM TOPICAL at 07:43

## 2018-06-28 RX ADMIN — INSULIN ASPART 1 UNITS: 100 INJECTION, SOLUTION INTRAVENOUS; SUBCUTANEOUS at 07:43

## 2018-06-28 RX ADMIN — CEFEPIME HYDROCHLORIDE 2 G: 2 INJECTION, POWDER, FOR SOLUTION INTRAVENOUS at 22:24

## 2018-06-28 RX ADMIN — VANCOMYCIN HYDROCHLORIDE 1500 MG: 10 INJECTION, POWDER, LYOPHILIZED, FOR SOLUTION INTRAVENOUS at 23:18

## 2018-06-28 RX ADMIN — SIMVASTATIN 40 MG: 20 TABLET, FILM COATED ORAL at 22:25

## 2018-06-28 NOTE — PLAN OF CARE
[facility], [place]    Sepsis Evaluation Progress Note    Date of Service: 06/27/2018    I was called to see Ever Crane due to abnormal vital signs triggering the Sepsis SIRS screening alert. He is known to have an infection.     Physical Exam    Vital Signs:  Temp: 102.7  F (39.3  C) Temp src: Oral BP: 122/70 Pulse: 85 Heart Rate: 96 Resp: (!) 32 SpO2: 92 % O2 Device: None (Room air)      Lab:  Lactic Acid   Date Value Ref Range Status   06/26/2018 2.2 (H) 0.7 - 2.0 mmol/L Final       The patient is at baseline mental status. A/O x4     The rest of their physical exam is significant for RR 32, Heart irregular rhythma Lungs with faint crackles o/w clear. BLE with errythema, RLE more edematous than left.     Assessment and Plan    The SIRS and exam findings are likely due to   sepsis.     ID: The patient is currently on the following antibiotics:  Anti-infectives (Future)    Start     Dose/Rate Route Frequency Ordered Stop    06/26/18 0630  levofloxacin (LEVAQUIN) infusion 750 mg      750 mg  100 mL/hr over 90 Minutes Intravenous EVERY 24 HOURS 06/26/18 0545          Current antibiotic coverage is adequate, but will broaden pending further work-up.    Fluid: Fluid bolus not indicated due to adequate hydration.    Lab: Repeat lactic acid is ordered STAT, will also draw procalcitonin, blood cultures, CBC, BMP, BNP     Disposition: The patient Will discuss transfer to in-patient status with medicie triage .    Guera Son, NP, APRN CNP    Addendum: Will change antibiotics from Levaquin to cefepime (PNC allergy) and vancomycin. RLE with more edematous than left, Will work RLE US.

## 2018-06-28 NOTE — PLAN OF CARE
Problem: Patient Care Overview  Goal: Plan of Care/Patient Progress Review  Outpatient/Observation goals to be met before discharge home:  - Dyspnea improved and oxygen saturations greater than 88% on room air or prior home oxygen levels- YES  - Tolerates oral antibiotics or has plans for home infusion set up. NO: Pt still on IV abx  - Vital signs normal or at patient baseline - Yes  - Infection is improving: No  - Return to baseline functional status-  NO  - Safe disposition plan has been identified -Pending
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
## 2018-06-28 NOTE — PLAN OF CARE
Problem: Patient Care Overview  Goal: Plan of Care/Patient Progress Review  OT/6DO: Cancel- Pt with safe discharge plan to LTC, will hold OT and reassess POC and LOS. PT following pt and will initiate OT if needs determined

## 2018-06-28 NOTE — PROGRESS NOTES
Social Work Services Progress Note    Hospital Day: 2 (Observation)  Date of Initial Social Work Evaluation:  Not Completed   Collaborated with:  Pt and Snehal Yana (Danuta [phone] f: [phone]), Primary Provider     Data:  SW auto consult as pt identified as coming from [facility]. Writer contacted facility and confirmed that pt has been a resident at their facility and they are going to accept back. Pt was scheduled to move into an AL yesterday, but due to change in functional status pt can return to LTC and proceed to AL when moving around better. Pt wants to return to facility     Intervention:  D/C Planning     Assessment:  Pt is receptive to SW visit and actively engaged in d/c planning. Pt reports he want to return to his LTC facility before moving into his AL.     Plan:    Anticipated Disposition:  Facility:  [facility] tomorrow. HE w/c ride scheduled for 2pm.     Barriers to d/c plan:  None anticipated     Follow Up:  SW to confirm d/c time w/ [facility] tomorrow. No PAS needed.
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
## 2018-06-28 NOTE — PHARMACY-VANCOMYCIN DOSING SERVICE
Pharmacy Vancomycin Initial Note  Date of Service 2018  Patient's  1945  73 year old, male    Indication: Community Acquired Pneumonia    Current estimated CrCl = Estimated Creatinine Clearance: 68.1 mL/min (based on Cr of 1.21).    Creatinine for last 3 days  2018:  8:04 PM Creatinine 1.33 mg/dL  2018:  3:39 AM Creatinine 1.28 mg/dL  2018:  6:18 AM Creatinine 1.17 mg/dL;  8:45 PM Creatinine 1.21 mg/dL    Recent Vancomycin Level(s) for last 3 days  No results found for requested labs within last 72 hours.      Vancomycin IV Administrations (past 72 hours)                   vancomycin (VANCOCIN) 2,250 mg in sodium chloride 0.9 % 500 mL intermittent infusion (mg) 2,250 mg New Bag 18 2213                Nephrotoxins and other renal medications (Future)    Start     Dose/Rate Route Frequency Ordered Stop    18 2300  vancomycin (VANCOCIN) 1,500 mg in sodium chloride 0.9 % 250 mL intermittent infusion      1,500 mg  over 90 Minutes Intravenous EVERY 12 HOURS 18 2246      18 0800  lisinopril (PRINIVIL/ZESTRIL) tablet 10 mg      10 mg Oral DAILY 18 0527      18 0526  furosemide (LASIX) tablet 20 mg      20 mg Oral DAILY PRN 18 0527            Contrast Orders - past 72 hours (72h ago through future)    Start     Dose/Rate Route Frequency Ordered Stop    18 1130  perflutren diluted 1mL to 2mL with saline (OPTISON) diluted injection 5 mL      5 mL Intravenous ONCE 18 1117 18 1130                Plan:  1.  Start vancomycin  1500 mg IV q12h.   2.  Goal Trough Level: 15-20 mg/L   3.  Pharmacy will check trough levels as appropriate in 1-3 Days.    4. Serum creatinine levels will be ordered daily for the first week of therapy and at least twice weekly for subsequent weeks.    5. Cascade method utilized to dose vancomycin therapy: Method 2    Junior Echavarria

## 2018-06-28 NOTE — MR AVS SNAPSHOT
After Visit Summary   2018    Ever Crane    MRN: [id number]           Patient Information     Date Of Birth          1945        Visit Information        Provider Department      2018 9:00 AM [facility] EEG TECH 4 [facility] EEG        Today's Diagnoses     Altered mental status    -  1       Follow-ups after your visit        Who to contact     Please call your clinic at [phone] to:    Ask questions about your health    Make or cancel appointments    Discuss your medicines    Learn about your test results    Speak to your doctor            Additional Information About Your Visit        MyChart Information     [employer] is an electronic gateway that provides easy, online access to your medical records. With [employer], you can request a clinic appointment, read your test results, renew a prescription or communicate with your care team.     To sign up for [employer] visit the website at [email or web address]   You will be asked to enter the access code listed below, as well as some personal information. Please follow the directions to create your username and password.     Your access code is: [email or web address]  Expires: 2018 10:14 AM     Your access code will  in 90 days. If you need help or a new code, please contact your [facility] or call [phone] for assistance.        Care EveryWhere ID     This is your Care EveryWhere ID. This could be used by other organizations to access your [place] medical records  [id number]         Blood Pressure from Last 3 Encounters:   18 125/79    Weight from Last 3 Encounters:   18 88.5 kg (195 lb)              Today, you had the following     No orders found for display         Today's Medication Changes      Notice     This visit is during an admission. Changes to the med list made in this visit will be reflected in the After Visit Summary of the admission.             Primary Care Provider Fax #    Provider  Not In System [phone]                Equal Access to Services     NILA COPE : Hadii aad ku hadbudyoel Russo, leanna ramirez, neeta mosquera, marianne martínezmaipedro seymour . So [facility] [phone].    ATENCIÓN: Si habla español, tiene a rodriguez disposición servicios gratuitos de asistencia lingüística. Llame al [phone].    We comply with applicable federal civil rights laws and [place] laws. We do not discriminate on the basis of race, color, national origin, age, disability, sex, sexual orientation, or gender identity.            Thank you!     Thank you for choosing [facility]  for your care. Our goal is always to provide you with excellent care. Hearing back from our patients is one way we can continue to improve our services. Please take a few minutes to complete the written survey that you may receive in the mail after your visit with us. Thank you!             Your Updated Medication List - Protect others around you: Learn how to safely use, store and throw away your medicines at www.disposemymeds.org.      Notice     This visit is during an admission. Changes to the med list made in this visit will be reflected in the After Visit Summary of the admission.

## 2018-06-29 ENCOUNTER — ALLIED HEALTH/NURSE VISIT (OUTPATIENT)
Dept: NEUROLOGY | Facility: CLINIC | Age: 73
DRG: 871 | End: 2018-06-29
Attending: PSYCHIATRY & NEUROLOGY
Payer: MEDICARE

## 2018-06-29 DIAGNOSIS — R40.4 TRANSIENT ALTERATION OF AWARENESS: Primary | ICD-10-CM

## 2018-06-29 LAB
ALBUMIN SERPL-MCNC: 2.8 G/DL (ref 3.4–5)
ALP SERPL-CCNC: 73 U/L (ref 40–150)
ALT SERPL W P-5'-P-CCNC: 25 U/L (ref 0–70)
ANION GAP SERPL CALCULATED.3IONS-SCNC: 11 MMOL/L (ref 3–14)
AST SERPL W P-5'-P-CCNC: 24 U/L (ref 0–45)
BILIRUB DIRECT SERPL-MCNC: 0.3 MG/DL (ref 0–0.2)
BILIRUB SERPL-MCNC: 1 MG/DL (ref 0.2–1.3)
BUN SERPL-MCNC: 25 MG/DL (ref 7–30)
CALCIUM SERPL-MCNC: 8.7 MG/DL (ref 8.5–10.1)
CHLORIDE SERPL-SCNC: 107 MMOL/L (ref 94–109)
CO2 SERPL-SCNC: 22 MMOL/L (ref 20–32)
CREAT SERPL-MCNC: 1.34 MG/DL (ref 0.66–1.25)
CRP SERPL-MCNC: 120 MG/L (ref 0–8)
D DIMER PPP FEU-MCNC: 0.6 UG/ML FEU (ref 0–0.5)
ERYTHROCYTE [DISTWIDTH] IN BLOOD BY AUTOMATED COUNT: 13.9 % (ref 10–15)
GFR SERPL CREATININE-BSD FRML MDRD: 52 ML/MIN/1.7M2
GLUCOSE BLDC GLUCOMTR-MCNC: 144 MG/DL (ref 70–99)
GLUCOSE BLDC GLUCOMTR-MCNC: 175 MG/DL (ref 70–99)
GLUCOSE BLDC GLUCOMTR-MCNC: 178 MG/DL (ref 70–99)
GLUCOSE BLDC GLUCOMTR-MCNC: 275 MG/DL (ref 70–99)
GLUCOSE SERPL-MCNC: 158 MG/DL (ref 70–99)
HCT VFR BLD AUTO: 30.3 % (ref 40–53)
HGB BLD-MCNC: 10.5 G/DL (ref 13.3–17.7)
INR PPP: 1.55 (ref 0.86–1.14)
MAGNESIUM SERPL-MCNC: 1.8 MG/DL (ref 1.6–2.3)
MCH RBC QN AUTO: 28.9 PG (ref 26.5–33)
MCHC RBC AUTO-ENTMCNC: 34.7 G/DL (ref 31.5–36.5)
MCV RBC AUTO: 84 FL (ref 78–100)
MRSA DNA SPEC QL NAA+PROBE: NEGATIVE
PLATELET # BLD AUTO: 95 10E9/L (ref 150–450)
POTASSIUM SERPL-SCNC: 3.7 MMOL/L (ref 3.4–5.3)
PROT SERPL-MCNC: 7.5 G/DL (ref 6.8–8.8)
RBC # BLD AUTO: 3.63 10E12/L (ref 4.4–5.9)
SODIUM SERPL-SCNC: 139 MMOL/L (ref 133–144)
SPECIMEN SOURCE: NORMAL
WBC # BLD AUTO: 7.4 10E9/L (ref 4–11)

## 2018-06-29 PROCEDURE — 36415 COLL VENOUS BLD VENIPUNCTURE: CPT | Performed by: PHYSICIAN ASSISTANT

## 2018-06-29 PROCEDURE — A9270 NON-COVERED ITEM OR SERVICE: HCPCS | Mod: GY | Performed by: PHYSICIAN ASSISTANT

## 2018-06-29 PROCEDURE — 25000132 ZZH RX MED GY IP 250 OP 250 PS 637: Mod: GY | Performed by: PHYSICIAN ASSISTANT

## 2018-06-29 PROCEDURE — 95951 ZZHC EEG VIDEO EACH 24 HR: CPT | Mod: ZF

## 2018-06-29 PROCEDURE — 85027 COMPLETE CBC AUTOMATED: CPT | Performed by: PHYSICIAN ASSISTANT

## 2018-06-29 PROCEDURE — 87641 MR-STAPH DNA AMP PROBE: CPT | Performed by: PHYSICIAN ASSISTANT

## 2018-06-29 PROCEDURE — 99207 ZZC APP CREDIT; MD BILLING SHARED VISIT: CPT | Performed by: PHYSICIAN ASSISTANT

## 2018-06-29 PROCEDURE — 25000128 H RX IP 250 OP 636: Performed by: PHYSICIAN ASSISTANT

## 2018-06-29 PROCEDURE — 83735 ASSAY OF MAGNESIUM: CPT | Performed by: PHYSICIAN ASSISTANT

## 2018-06-29 PROCEDURE — 85379 FIBRIN DEGRADATION QUANT: CPT | Performed by: PHYSICIAN ASSISTANT

## 2018-06-29 PROCEDURE — 86140 C-REACTIVE PROTEIN: CPT | Performed by: PHYSICIAN ASSISTANT

## 2018-06-29 PROCEDURE — 25000128 H RX IP 250 OP 636

## 2018-06-29 PROCEDURE — 80048 BASIC METABOLIC PNL TOTAL CA: CPT | Performed by: PHYSICIAN ASSISTANT

## 2018-06-29 PROCEDURE — 87640 STAPH A DNA AMP PROBE: CPT | Performed by: PHYSICIAN ASSISTANT

## 2018-06-29 PROCEDURE — 00000146 ZZHCL STATISTIC GLUCOSE BY METER IP

## 2018-06-29 PROCEDURE — 12000001 ZZH R&B MED SURG/OB UMMC

## 2018-06-29 PROCEDURE — 25000132 ZZH RX MED GY IP 250 OP 250 PS 637: Mod: GY | Performed by: INTERNAL MEDICINE

## 2018-06-29 PROCEDURE — 85610 PROTHROMBIN TIME: CPT | Performed by: PHYSICIAN ASSISTANT

## 2018-06-29 PROCEDURE — A9270 NON-COVERED ITEM OR SERVICE: HCPCS | Mod: GY | Performed by: INTERNAL MEDICINE

## 2018-06-29 PROCEDURE — 99233 SBSQ HOSP IP/OBS HIGH 50: CPT | Performed by: INTERNAL MEDICINE

## 2018-06-29 PROCEDURE — 25000128 H RX IP 250 OP 636: Performed by: NURSE PRACTITIONER

## 2018-06-29 PROCEDURE — 80076 HEPATIC FUNCTION PANEL: CPT | Performed by: PHYSICIAN ASSISTANT

## 2018-06-29 RX ORDER — POTASSIUM CHLORIDE 7.45 MG/ML
10 INJECTION INTRAVENOUS
Status: DISCONTINUED | OUTPATIENT
Start: 2018-06-29 | End: 2018-06-30 | Stop reason: HOSPADM

## 2018-06-29 RX ORDER — WARFARIN SODIUM 4 MG/1
4 TABLET ORAL
Status: COMPLETED | OUTPATIENT
Start: 2018-06-29 | End: 2018-06-29

## 2018-06-29 RX ORDER — POTASSIUM CHLORIDE 29.8 MG/ML
20 INJECTION INTRAVENOUS
Status: DISCONTINUED | OUTPATIENT
Start: 2018-06-29 | End: 2018-06-29 | Stop reason: RX

## 2018-06-29 RX ORDER — POTASSIUM CL/LIDO/0.9 % NACL 10MEQ/0.1L
10 INTRAVENOUS SOLUTION, PIGGYBACK (ML) INTRAVENOUS
Status: DISCONTINUED | OUTPATIENT
Start: 2018-06-29 | End: 2018-06-30 | Stop reason: HOSPADM

## 2018-06-29 RX ORDER — POTASSIUM CHLORIDE 750 MG/1
20-40 TABLET, EXTENDED RELEASE ORAL
Status: DISCONTINUED | OUTPATIENT
Start: 2018-06-29 | End: 2018-06-30 | Stop reason: HOSPADM

## 2018-06-29 RX ORDER — FUROSEMIDE 10 MG/ML
40 INJECTION INTRAMUSCULAR; INTRAVENOUS ONCE
Status: DISCONTINUED | OUTPATIENT
Start: 2018-06-29 | End: 2018-06-29

## 2018-06-29 RX ORDER — POTASSIUM CHLORIDE 1.5 G/1.58G
20-40 POWDER, FOR SOLUTION ORAL
Status: DISCONTINUED | OUTPATIENT
Start: 2018-06-29 | End: 2018-06-30 | Stop reason: HOSPADM

## 2018-06-29 RX ORDER — MAGNESIUM SULFATE HEPTAHYDRATE 40 MG/ML
2 INJECTION, SOLUTION INTRAVENOUS DAILY PRN
Status: DISCONTINUED | OUTPATIENT
Start: 2018-06-29 | End: 2018-06-30 | Stop reason: HOSPADM

## 2018-06-29 RX ORDER — MAGNESIUM SULFATE HEPTAHYDRATE 40 MG/ML
4 INJECTION, SOLUTION INTRAVENOUS EVERY 4 HOURS PRN
Status: DISCONTINUED | OUTPATIENT
Start: 2018-06-29 | End: 2018-06-30 | Stop reason: HOSPADM

## 2018-06-29 RX ADMIN — MAGNESIUM SULFATE HEPTAHYDRATE 2 G: 40 INJECTION, SOLUTION INTRAVENOUS at 10:03

## 2018-06-29 RX ADMIN — SPIRONOLACTONE 25 MG: 25 TABLET ORAL at 08:09

## 2018-06-29 RX ADMIN — SIMVASTATIN 40 MG: 20 TABLET, FILM COATED ORAL at 21:35

## 2018-06-29 RX ADMIN — THIAMINE HYDROCHLORIDE 500 MG: 100 INJECTION, SOLUTION INTRAMUSCULAR; INTRAVENOUS at 04:40

## 2018-06-29 RX ADMIN — MICONAZOLE NITRATE: 2 POWDER TOPICAL at 13:33

## 2018-06-29 RX ADMIN — METOPROLOL SUCCINATE 50 MG: 50 TABLET, EXTENDED RELEASE ORAL at 08:08

## 2018-06-29 RX ADMIN — OMEPRAZOLE 20 MG: 20 CAPSULE, DELAYED RELEASE ORAL at 08:09

## 2018-06-29 RX ADMIN — INSULIN ASPART 1 UNITS: 100 INJECTION, SOLUTION INTRAVENOUS; SUBCUTANEOUS at 18:00

## 2018-06-29 RX ADMIN — MICONAZOLE NITRATE: 20 CREAM TOPICAL at 08:09

## 2018-06-29 RX ADMIN — VANCOMYCIN HYDROCHLORIDE 1500 MG: 10 INJECTION, POWDER, LYOPHILIZED, FOR SOLUTION INTRAVENOUS at 11:41

## 2018-06-29 RX ADMIN — CEFEPIME HYDROCHLORIDE 2 G: 2 INJECTION, POWDER, FOR SOLUTION INTRAVENOUS at 05:33

## 2018-06-29 RX ADMIN — THIAMINE HYDROCHLORIDE 500 MG: 100 INJECTION, SOLUTION INTRAMUSCULAR; INTRAVENOUS at 21:27

## 2018-06-29 RX ADMIN — AMOXICILLIN AND CLAVULANATE POTASSIUM 1 TABLET: 875; 125 TABLET, FILM COATED ORAL at 14:45

## 2018-06-29 RX ADMIN — METOPROLOL SUCCINATE 50 MG: 50 TABLET, EXTENDED RELEASE ORAL at 21:16

## 2018-06-29 RX ADMIN — AMOXICILLIN AND CLAVULANATE POTASSIUM 1 TABLET: 875; 125 TABLET, FILM COATED ORAL at 21:14

## 2018-06-29 RX ADMIN — INSULIN ASPART 3 UNITS: 100 INJECTION, SOLUTION INTRAVENOUS; SUBCUTANEOUS at 11:47

## 2018-06-29 RX ADMIN — POTASSIUM CHLORIDE 20 MEQ: 750 TABLET, EXTENDED RELEASE ORAL at 10:06

## 2018-06-29 RX ADMIN — THIAMINE HYDROCHLORIDE 500 MG: 100 INJECTION, SOLUTION INTRAMUSCULAR; INTRAVENOUS at 13:33

## 2018-06-29 RX ADMIN — LISINOPRIL 10 MG: 10 TABLET ORAL at 08:09

## 2018-06-29 RX ADMIN — WARFARIN SODIUM 4 MG: 4 TABLET ORAL at 18:23

## 2018-06-29 NOTE — PLAN OF CARE
Problem: Patient Care Overview  Goal: Plan of Care/Patient Progress Review  AVSS. Pt alert and oriented. Ambulating with sba. EEG monitoring in progress. Pt tolerating regular diet. Voiding spont. BM today. Denies pain. Mg and K replaced. IV infiltrated. Awaiting vascular access for new IV. Pt on cefepime and vanco. Continue with poc.
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
## 2018-06-29 NOTE — PLAN OF CARE
Problem: Patient Care Overview  Goal: Plan of Care/Patient Progress Review  Patient alert, oriented, denies pain, denies shortness of breath, still with occasional non productive cough, lung sounds diminished all over lung fields. No shortness of breath on exertion, ambulated to the bathroom, voiding without difficulty. Tolerating food and fluids. On EEG video monitoring and will end this morning. VSS

## 2018-06-29 NOTE — PLAN OF CARE
Problem: Patient Care Overview  Goal: Plan of Care/Patient Progress Review  PT / 6D - CXL - pt preparing to transfer units and unavailable for PT session. Rescheduled per POC.

## 2018-06-29 NOTE — PROGRESS NOTES
Social Work Services Progress Note    Hospital Day: 2 on obs  Date of Initial Social Work Evaluation:  Not Completed   Collaborated with:  Snehal Millan ([place] [phone] f: [phone]),      Data:  Pt is a 73 year old male being followed by SW for return to LTC.    Intervention:  SW asked to cancel pt's ride today as he has fevers and needs to be treated for an additional day inpatient.  Will be available tomorrow to reschedule ride when medically stabilized.    Assessment:  Did not meet with pt for this encounter note.    Plan:    Anticipated Disposition:  [facility]    Barriers to d/c plan: Fever, medical stability    Follow Up:  SW to follow for discharge planning.    SUKI Luna, APSW  6C Unit   Phone: [phone]  Pager: [phone]  Unit: [phone]
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
## 2018-06-29 NOTE — PROGRESS NOTES
[facility], [place]    Internal Medicine Progress Note - Gold Service      Assessment & Plan   Ever Crane is a 73 year old male admitted on 6/25/2018. 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 Presented to the ED where he was noted to be febrile and tachypneic, felt possibly related to CAP and admitted to ED observation for antibiotics. Initially improved although began spiking fevers again and was transferred to medicine 6/27 for ongoing management.    Fevers, Leukocytosis Suspected 2/2 CAP. Found altered in car, noted to be tachypneic and febrile (temp 102.2 F) in ED. Work-up revealed WBC 20.9, LA 1.4, VBG with mild hypocapnia (pH 7.39, pCO2 of 33, HCO3 of 20), CXR with small bilateral pleural effusions and adjacent basilar atelectasis and/or minor consolidation; also noted mild perihilar and mixed interstitial and airspace opacities likely representing pulmonary edema. Started on vancomycin/zosyn for suspected CAP with subsequent improvement in symptoms. Transitioned to levofloxacin 6/26 with plans to discharge on PO although spiked a fever up to 102.7 F and was transferred to medicine for further evaluation. Broadened to vancomycin and cefepime on 6/27. Has now remained afebrile x 24 hours, WBC normalized at 7.4 (9.2), procal 0.37 (0.33),  (160), patient breathing comfortably on RA. BCx remain NGTD. Denies cough, SOB. Plan to transition to PO augmentin today and monitor for fevers overnight.  - D/c IV vanc/cefepime and trial PO augmentin (discussed with pharmacy as patient has penicillin allergy from childhood but has since tolerated zosyn without issue; recommend starting augmentin and monitoring for any  reaction)  - Follow BCx  - Sputum cultures ordered although patient not having productive cough  - Aggressive IS  - Monitor fever curve  - PRN tylenol  - Trend CBC, CRP, procal    AMS (resolved). Presented after being found driving erratically and was minimally responsive. Noted to be tachypneic and febrile in ED. Work-up with CLAIRE 0.0, U tox negative, , elevated WBC of 20.9, VBG with pH 7.39/pCO2 33/HCO3 20, bland UA, CT head negative, CXR with small bilateral pleural effusions and adjacent basilar atelectasis and/or consolidation. Started on vancomycin/zosyn for possible CAP with subsequent improvement in mentation. Highest suspicion for infection as etiology of AMS, other possibilities include seizure activity, ingestion, other CNS source. Started on high-dose thiamine 6/28 x 3 days. Currently A&O x 4. Of note patient has had 's license revoked in the past due to erratic driving.   - Continue IV thiamine 500 mg q8h x 3 days  - EEG monitoring  - PT/OT consulted  - Fall precautions    Mild Hyperbilirubinemia. T bili mildly elevated at 1.4 with direct bilirubin of 0.3. Abdominal XR 6/27 revealed 2.5 cm calcification in right abdominal likely representing a gallstone. In setting of fevers, obtained abdominal US to further evaluate for cholecystitis. US revealed hepatomegaly and cholelithiasis with no associated findings of acute cholecystitis.     HFpEF with Acute Exacerbation. Recently admitted to OSH 1/2018 with HF exacerbation ( at that time - normal <70). Maintained on metoprolol, lisinopril, lasix, and spironolactone PTA. EDW ~196-199 lbs. BNP elevated at ~2000 on admission; echo revealed normal LV function, EF 55-60%, normal to mildly reduced RVF, mild pulmonary HTN. Lasix and spironolactone held on admission due to concerns for sepsis, also received 3 + liters of IVF. Received dose of IV lasix 6/28, 6/29. Cardiology consulted and recommending daily IV lasix as tolerated, also started on 2 L  FR. D-dimer mildly elevated at 0.6 although patient clinically improving, will not workup any further at this time. 2+ pitting edema in RLE on exam, patient denies SOB or PND.  - Continue metoprolol 50 mg BID  - No IV diuresis today as Cr bumped; will reassess tomorrow AM  - Continue lisinopril 10 mg QD and spironolactone 25 mg QD  - Cardiology following, appreciate assistance  - I&O, daily weights    Paroxysmal Atrial Fibrillation, Atrial Flutter. S/p cardioversion in 2011. On metoprolol per above for rate control/HF. CHADSVASc score of 5, maintained on warfarin for anticoagulation. Initially in sinus tachycardia on admission, repeat EKG this AM revealed atrial flutter; patient asymptomatic. INR subtherapeutic at 1.55.  - Continue metoprolol per above  - Warfarin dosing per pharmacy  - Daily INR    CAD. S/p CABG in 2000 with SVG to OM1 and PDA. Maintained on simvastatin PTA.  - Continue simvastatin 40 mg QHS    CKD Stage III. Cr 1.33 on admission, recent BL ~1.3-1.4. UA bland on admission. Renal function initially improved after receiving 3+ liters of IVF, now slightly up with IV diuresis - Cr 1.34 (1.13) this AM. Will hold off on further IV diuresis at this time.  - I&O, daily weights  - Avoid nephrotoxic agents  - Trend BMP    DM Type II. HgbA1C 7.7% on 6/2018. Maintained on glipizide and metformin PTA although held on admission. Currently on sliding scale, -270's over past 24 hours.  - Continue holding PTA metformin and glipizide  - Medium SSI  - Moderate CHO diet  - BG checks qAC, qHS  - Hypoglycemia protocol    LE Edema, Venous Insufficiency. Chronic. Noted RLE swelling > left on admission. RLE US negative for DVT on 6/27. No open wounds on exam, prominent erythema/violaceous discoloration extending from ankles to just below knee. No active signs of infection at this time.  - Elevate legs when in bed  - Monitor for skin changes    Perineal Candidiasis. Reportedly present for several months, has had  issues with medication compliance.  - Continue miconazole cream/powder BID  - Monitor    # Pain Assessment:  Current Pain Score 6/29/2018   Patient currently in pain? denies   Pain location -   Pain descriptors -   Ever rogers pain level was assessed and he currently denies pain.      Diet: 2 Gram Sodium Diet  Fluids: N/A  DVT Prophylaxis: Warfarin  Code Status: Full Code    Disposition Plan   Expected discharge: 1-2 days, recommended to prior living arrangement once antibiotic plan established and mental status at baseline.     Entered: Shadia Vu 06/29/2018, 7:57 AM   Information in the above section will display in the discharge planner report.      The patient's care was discussed with the Attending Physician, Dr. Gutierrez.    Shadia Vu  Internal Medicine Staff Hospitalist Service  [facility]  Pager: [phone]  Please see sticky note for cross cover information    Interval History   Ever is doing well this morning. Slept well overnight. Remained afebrile over past 24 hours. No SOB or cough. Eating and drinking well. No further episodes of diarrhea. No chest pain or palpitations.    Data reviewed today: I reviewed all medications, new labs and imaging results over the last 24 hours. I personally reviewed no images or EKG's today.    Physical Exam   Vital Signs: Temp: 98.2  F (36.8  C) Temp src: Oral BP: 107/67 Pulse: 70 Heart Rate: 72 Resp: 18 SpO2: 100 % O2 Device: None (Room air)    Weight: 195 lbs 0 oz  General Appearance: Slightly disheveled elderly  male resting comfortably in bed, NAD.  Respiratory: Respiratory effort normal on RA. Lungs CTAB without rales, rhonchi, or wheezing.  Cardiovascular: RRR, S1/S2. Grade 3/6 RUTH.  GI: Abdomen obese, soft, non-distended, non-tender. Negative Perry's. Bowel sounds normoactive.  Skin: Stable bilateral erythematous/violaceous discoloration of legs extending from ankle up to mid-calf, R>L. Outlined in  marker.  Extremities: Partial amputation of left foot. 2+ pitting edema in RLE.     Data   Data     Recent Labs  Lab 06/29/18 0624 06/28/18 0624 06/27/18 2045 06/27/18 0618 06/26/18  0339   WBC 7.4 9.2 11.3* 12.2*  < > 23.4*   HGB 10.5* 10.5* 10.6* 10.2*  < > 12.1*   MCV 84 85 87 87  < > 87   PLT 95* 89* 93* 89*  < > 102*   INR 1.55* 1.72*  --  2.06*  --  2.57*    136 136 135  --  139   POTASSIUM 3.7 3.9 5.0 4.3  --  4.8   CHLORIDE 107 104 105 106  --  107   CO2 22 21 22 19*  --  19*   BUN 25 22 21 24  --  44*   CR 1.34* 1.13 1.21 1.17  --  1.28*   ANIONGAP 11 11 9 9  --  13   MELODY 8.7 8.6 8.7 8.5  --  8.3*   * 170* 168* 152*  --  150*   ALBUMIN 2.8*  --  2.7*  --   --  3.4   PROTTOTAL 7.5  --  7.3  --   --  7.7   BILITOTAL 1.0  --  1.4*  --   --  1.1   ALKPHOS 73  --  74  --   --  88   ALT 25  --  16  --   --  17   AST 24  --  26  --   --  13   TROPI  --   --   --   --   --  <0.015   < > = values in this interval not displayed.

## 2018-06-29 NOTE — MR AVS SNAPSHOT
After Visit Summary   2018    Ever Crane    MRN: [id number]           Patient Information     Date Of Birth          1945        Visit Information        Provider Department      2018 7:00 AM [facility] EEG TECH 4 [facility] EEG        Today's Diagnoses     Transient alteration of awareness    -  1       Follow-ups after your visit        Your next 10 appointments already scheduled     2018  7:00 AM CDT   24 Hour Video Visit with [facility] EEG TECH 4   [facility] ([facility])    [facility]  [address] [id number] [phone]           [place]: Your appointment is scheduled at [facility]. [address] [place] [address]              Who to contact     Please call your clinic at [phone] to:    Ask questions about your health    Make or cancel appointments    Discuss your medicines    Learn about your test results    Speak to your doctor            Additional Information About Your Visit        MyChart Information     App[employer] is an electronic gateway that provides easy, online access to your medical records. With [employer], you can request a clinic appointment, read your test results, renew a prescription or communicate with your care team.     To sign up for App[employer] visit the website at [email or web address]   You will be asked to enter the access code listed below, as well as some personal information. Please follow the directions to create your username and password.     Your access code is: [email or web address]  Expires: 2018 10:14 AM     Your access code will  in 90 days. If you need help or a new code, please contact your [facility] or call [phone] for assistance.        Care EveryWhere ID     This is your Care EveryWhere ID. This could be used by other organizations to access your [facility] medical records  [id number]         Blood Pressure from  Last 3 Encounters:   06/29/18 98/63    Weight from Last 3 Encounters:   06/25/18 88.5 kg (195 lb)              We Performed the Following     Glucose by meter     Glucose by meter          Today's Medication Changes      Notice     This visit is during an admission. Changes to the med list made in this visit will be reflected in the After Visit Summary of the admission.             Primary Care Provider Fax #    Provider Not In System [phone]                Equal Access to Services     NILA COPE : Hadii aad ku hadasho Soomaali, waaxda luqadaha, qaybta kaalmada adeegyada, waxay wenceslaoin rachaeln екатерина martínezmaipedro seymour . So [facility] [phone].    ATENCIÓN: Si habla español, tiene a rodriguez disposición servicios gratuitos de asistencia lingüística. Llame al [phone].    We comply with applicable federal civil rights laws and [place] laws. We do not discriminate on the basis of race, color, national origin, age, disability, sex, sexual orientation, or gender identity.            Thank you!     Thank you for choosing [facility]  for your care. Our goal is always to provide you with excellent care. Hearing back from our patients is one way we can continue to improve our services. Please take a few minutes to complete the written survey that you may receive in the mail after your visit with us. Thank you!             Your Updated Medication List - Protect others around you: Learn how to safely use, store and throw away your medicines at www.disposemymeds.org.      Notice     This visit is during an admission. Changes to the med list made in this visit will be reflected in the After Visit Summary of the admission.

## 2018-06-30 ENCOUNTER — APPOINTMENT (OUTPATIENT)
Dept: OCCUPATIONAL THERAPY | Facility: CLINIC | Age: 73
DRG: 871 | End: 2018-06-30
Attending: PHYSICIAN ASSISTANT
Payer: MEDICARE

## 2018-06-30 VITALS
SYSTOLIC BLOOD PRESSURE: 95 MMHG | DIASTOLIC BLOOD PRESSURE: 52 MMHG | OXYGEN SATURATION: 94 % | HEIGHT: 72 IN | RESPIRATION RATE: 16 BRPM | HEART RATE: 75 BPM | WEIGHT: 201.94 LBS | BODY MASS INDEX: 27.35 KG/M2 | TEMPERATURE: 97.2 F

## 2018-06-30 LAB
ANION GAP SERPL CALCULATED.3IONS-SCNC: 11 MMOL/L (ref 3–14)
BUN SERPL-MCNC: 22 MG/DL (ref 7–30)
CALCIUM SERPL-MCNC: 9 MG/DL (ref 8.5–10.1)
CHLORIDE SERPL-SCNC: 108 MMOL/L (ref 94–109)
CO2 SERPL-SCNC: 20 MMOL/L (ref 20–32)
CREAT SERPL-MCNC: 1.2 MG/DL (ref 0.66–1.25)
ERYTHROCYTE [DISTWIDTH] IN BLOOD BY AUTOMATED COUNT: 13.7 % (ref 10–15)
GFR SERPL CREATININE-BSD FRML MDRD: 59 ML/MIN/1.7M2
GLUCOSE BLDC GLUCOMTR-MCNC: 156 MG/DL (ref 70–99)
GLUCOSE BLDC GLUCOMTR-MCNC: 199 MG/DL (ref 70–99)
GLUCOSE SERPL-MCNC: 162 MG/DL (ref 70–99)
HCT VFR BLD AUTO: 30.5 % (ref 40–53)
HGB BLD-MCNC: 10.5 G/DL (ref 13.3–17.7)
INR PPP: 1.55 (ref 0.86–1.14)
MAGNESIUM SERPL-MCNC: 2.4 MG/DL (ref 1.6–2.3)
MCH RBC QN AUTO: 28.6 PG (ref 26.5–33)
MCHC RBC AUTO-ENTMCNC: 34.4 G/DL (ref 31.5–36.5)
MCV RBC AUTO: 83 FL (ref 78–100)
PLATELET # BLD AUTO: 105 10E9/L (ref 150–450)
POTASSIUM SERPL-SCNC: 4.1 MMOL/L (ref 3.4–5.3)
RBC # BLD AUTO: 3.67 10E12/L (ref 4.4–5.9)
SODIUM SERPL-SCNC: 139 MMOL/L (ref 133–144)
WBC # BLD AUTO: 7.3 10E9/L (ref 4–11)

## 2018-06-30 PROCEDURE — 40000133 ZZH STATISTIC OT WARD VISIT

## 2018-06-30 PROCEDURE — 97166 OT EVAL MOD COMPLEX 45 MIN: CPT | Mod: GO

## 2018-06-30 PROCEDURE — 36415 COLL VENOUS BLD VENIPUNCTURE: CPT | Performed by: PHYSICIAN ASSISTANT

## 2018-06-30 PROCEDURE — 85610 PROTHROMBIN TIME: CPT | Performed by: PHYSICIAN ASSISTANT

## 2018-06-30 PROCEDURE — 25000132 ZZH RX MED GY IP 250 OP 250 PS 637: Mod: GY | Performed by: PHYSICIAN ASSISTANT

## 2018-06-30 PROCEDURE — 99207 ZZC APP CREDIT; MD BILLING SHARED VISIT: CPT | Performed by: PHYSICIAN ASSISTANT

## 2018-06-30 PROCEDURE — 97535 SELF CARE MNGMENT TRAINING: CPT | Mod: GO

## 2018-06-30 PROCEDURE — 85027 COMPLETE CBC AUTOMATED: CPT | Performed by: PHYSICIAN ASSISTANT

## 2018-06-30 PROCEDURE — 97127 ZZHC OT THERAPEUTIC INTERVENTION W/FOCUS ON COGNITIVE FUNCTION,EA 15 MIN: CPT | Mod: GO

## 2018-06-30 PROCEDURE — 80048 BASIC METABOLIC PNL TOTAL CA: CPT | Performed by: PHYSICIAN ASSISTANT

## 2018-06-30 PROCEDURE — A9270 NON-COVERED ITEM OR SERVICE: HCPCS | Mod: GY | Performed by: PHYSICIAN ASSISTANT

## 2018-06-30 PROCEDURE — 83735 ASSAY OF MAGNESIUM: CPT | Performed by: PHYSICIAN ASSISTANT

## 2018-06-30 PROCEDURE — 25000128 H RX IP 250 OP 636: Performed by: PHYSICIAN ASSISTANT

## 2018-06-30 PROCEDURE — 99239 HOSP IP/OBS DSCHRG MGMT >30: CPT | Performed by: INTERNAL MEDICINE

## 2018-06-30 PROCEDURE — 00000146 ZZHCL STATISTIC GLUCOSE BY METER IP

## 2018-06-30 RX ORDER — LISINOPRIL 10 MG/1
5 TABLET ORAL DAILY
Qty: 30 TABLET | Refills: 0 | DISCHARGE
Start: 2018-06-30 | End: 2022-06-03

## 2018-06-30 RX ORDER — WARFARIN SODIUM 1 MG/1
1.5 TABLET ORAL DAILY
Qty: 30 TABLET | Refills: 0 | DISCHARGE
Start: 2018-07-01 | End: 2022-06-03

## 2018-06-30 RX ORDER — GLIPIZIDE 10 MG/1
10 TABLET ORAL
Status: DISCONTINUED | OUTPATIENT
Start: 2018-06-30 | End: 2018-06-30 | Stop reason: HOSPADM

## 2018-06-30 RX ORDER — WARFARIN SODIUM 4 MG/1
4 TABLET ORAL DAILY
Qty: 1 TABLET | Refills: 0 | DISCHARGE
Start: 2018-06-30 | End: 2018-07-01

## 2018-06-30 RX ORDER — GLIPIZIDE 10 MG/1
10 TABLET ORAL
Qty: 30 TABLET | Refills: 0 | DISCHARGE
Start: 2018-06-30 | End: 2023-01-20

## 2018-06-30 RX ORDER — MICONAZOLE NITRATE 20 MG/G
CREAM TOPICAL 2 TIMES DAILY
Qty: 35 G | Refills: 0 | DISCHARGE
Start: 2018-06-30 | End: 2022-06-03

## 2018-06-30 RX ORDER — GLIPIZIDE 5 MG/1
15 TABLET ORAL EVERY EVENING
Qty: 30 TABLET | Refills: 0 | DISCHARGE
Start: 2018-06-30 | End: 2023-01-20

## 2018-06-30 RX ADMIN — OMEPRAZOLE 20 MG: 20 CAPSULE, DELAYED RELEASE ORAL at 08:36

## 2018-06-30 RX ADMIN — AMOXICILLIN AND CLAVULANATE POTASSIUM 1 TABLET: 875; 125 TABLET, FILM COATED ORAL at 08:37

## 2018-06-30 RX ADMIN — SPIRONOLACTONE 25 MG: 25 TABLET ORAL at 08:41

## 2018-06-30 RX ADMIN — THIAMINE HYDROCHLORIDE 500 MG: 100 INJECTION, SOLUTION INTRAMUSCULAR; INTRAVENOUS at 04:12

## 2018-06-30 RX ADMIN — THIAMINE HYDROCHLORIDE 500 MG: 100 INJECTION, SOLUTION INTRAMUSCULAR; INTRAVENOUS at 13:21

## 2018-06-30 RX ADMIN — LISINOPRIL 10 MG: 10 TABLET ORAL at 08:41

## 2018-06-30 RX ADMIN — MICONAZOLE NITRATE: 20 CREAM TOPICAL at 08:42

## 2018-06-30 RX ADMIN — GLIPIZIDE 10 MG: 10 TABLET ORAL at 08:50

## 2018-06-30 RX ADMIN — METOPROLOL SUCCINATE 50 MG: 50 TABLET, EXTENDED RELEASE ORAL at 08:41

## 2018-06-30 ASSESSMENT — ACTIVITIES OF DAILY LIVING (ADL): PREVIOUS_RESPONSIBILITIES: DRIVING

## 2018-06-30 NOTE — PROGRESS NOTES
Patient arrived from 6D via wheelchair accompanied by transport . Patient A & O X 3, settled to room 228. Patient BLE erythema, with Partial amputation of left foot. Patient orient to call light , bathroom and unite routine . Patient BS , 175 and insulin given per order. Call light in reach, bed alarm on for safety. Continue monitor closely and update MD with concerns.

## 2018-06-30 NOTE — PROGRESS NOTES
Social Work Services Progress Note    Hospital Day: 3 on obs  Date of Initial Social Work Evaluation:  Not Completed   Collaborated with:  Snehal Millan ([place] [phone] f: [phone]),  Anais PH: [phone]    Data:  Pt is a 73 year old male being followed by SW for return to LTC.    Intervention:  Pt continues to not be able to discharge due to fevers.  Will continue to follow for discharge when medically stable.     SW also received call from pt's  Anais ([employer]) PH: [phone] who is requesting a call with updates on pt's discharge plan.  SW updated that the plan is to return to LTC at discharge.    Assessment:  Did not meet with pt for this encounter note.    Plan:    Anticipated Disposition:  Facility:  Return to [facility]    Barriers to d/c plan: Fever, medical stability    Follow Up:  SW to follow for discharge planning.    SUKI Luna, APSW  6C Unit   Phone: [phone]  Pager: [phone]  Unit: [phone]

## 2018-06-30 NOTE — PLAN OF CARE
Problem: Patient Care Overview  Goal: Plan of Care/Patient Progress Review  Outcome: Improving    Assumed cares of patient from [id number].  /65 (BP Location: Right arm)  Pulse 75  Temp 97.7  F (36.5  C) (Oral)  Resp 18  Ht 1.829 m (6')  Wt 91.6 kg (201 lb 15.1 oz)  SpO2 97%  BMI 27.39 kg/m2  Alert/forgetul/impulsive. Contact isolation for MRSA. Patient up with SBA in room. Pt denies pain. He received IV thiamine x2. ,199. Pt reports recent transfer from [facility] to [place] and intends to return once medically stable. He got himself up out of bed after having mixed urine/bm in bed to use BR. He needed redirection to ask for help. Pt slightly impulsive. RN encouraged him to use call light re: going to bathroom. Reviewed importance of tracking urine output and potential need to straight cath if not output and patient stated he would refuse strait cath.  Pt's appetite good. Ate 100% of entire dinner tray.  2 G NA diet and 2000 FL.  Hourly rounding complete, donnie light within reach. Plan: Continue to monitor and follow POC. Notify MD of changes.

## 2018-06-30 NOTE — PROGRESS NOTES
Care Coordinator Progress Note    Admission Date/Time:  6/25/2018  Attending MD:  Michelle Lauren MD    Data  Chart reviewed, discussed with interdisciplinary team.   Patient was admitted for:    Pneumonia due to infectious organism, unspecified laterality, unspecified part of lung  Altered mental status, unspecified altered mental status type  Type 2 diabetes mellitus without complication, unspecified long term insulin use status (H)  Atherosclerosis of native coronary artery, angina presence unspecified, unspecified whether native or transplanted heart  Atrial fibrillation, unspecified type (H)  Erythematous condition  Candidiasis of skin and nails  Encounter for long-term (current) use of insulin (H)  Long-term (current) use of anticoagulants  Postsurgical aortocoronary bypass status  Candidal intertrigo  Benign essential hypertension.    RN CC consult for discharge planning.  Per chart review noted that SW following and patient will d/c back to LTC facility.   Please refer to SW notes for additional information.    Nettie Hawthorne RN BSN, PHN Weekend RN Care Coordinator   [email or web address]  Pager [phone]  6/30/2018 10:25 AM
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
## 2018-06-30 NOTE — PLAN OF CARE
Problem: Patient Care Overview  Goal: Plan of Care/Patient Progress Review  Outcome: Adequate for Discharge Date Met: 06/30/18  D/I/A: Patient A & O X 3, VSS no respiratory distress noted, and denies pain. Patient received scheduled medications as order. Appetite good , void adequate, Pt report had loose stool x 1. Patient had shower. Patient had discharge order to go to TCU , patient aware and agreeable with plan. Patient stable . Writer called TCU and given report to Roula SCHULTZ. The nurse report , Patient is not new for them . ([phone]). SW, arrange ride with [employer] transport , and Patient left via wheelchair at ! 1420, accompanied, by transport . Patient belonging and discharge packet sent with.

## 2018-06-30 NOTE — PLAN OF CARE
Problem: Patient Care Overview  Goal: Plan of Care/Patient Progress Review  Physical Therapy Discharge Summary    Reason for therapy discharge:    Discharged to transitional care facility.    Progress towards therapy goal(s). See goals on Care Plan in [facility] electronic health record for goal details.  Goals partially met.  Barriers to achieving goals:   discharge from facility.    Therapy recommendation(s):    Continued therapy is recommended.  Rationale/Recommendations:  Skilled PT continues to be indicated to progress functional strength and independence with bed mobility, transfers and gait.
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
## 2018-07-01 LAB
BACTERIA SPEC CULT: NO GROWTH
BACTERIA SPEC CULT: NO GROWTH
Lab: NORMAL
Lab: NORMAL
SPECIMEN SOURCE: NORMAL
SPECIMEN SOURCE: NORMAL
VIT B1 BLD-MCNC: 208 NMOL/L (ref 70–180)

## 2018-07-02 ENCOUNTER — OFFICE VISIT - HEALTHEAST (OUTPATIENT)
Dept: GERIATRICS | Facility: CLINIC | Age: 73
End: 2018-07-02

## 2018-07-02 DIAGNOSIS — E78.5 DYSLIPIDEMIA, GOAL LDL BELOW 70: ICD-10-CM

## 2018-07-02 DIAGNOSIS — I10 ESSENTIAL HYPERTENSION: ICD-10-CM

## 2018-07-02 DIAGNOSIS — G63 POLYNEUROPATHY ASSOCIATED WITH UNDERLYING DISEASE (H): ICD-10-CM

## 2018-07-02 DIAGNOSIS — I73.9 PAD (PERIPHERAL ARTERY DISEASE) (H): ICD-10-CM

## 2018-07-02 DIAGNOSIS — I48.19 PERSISTENT ATRIAL FIBRILLATION (H): ICD-10-CM

## 2018-07-02 DIAGNOSIS — I25.83 CORONARY ARTERY DISEASE DUE TO LIPID RICH PLAQUE: ICD-10-CM

## 2018-07-02 DIAGNOSIS — Z91.199 MEDICALLY NONCOMPLIANT: ICD-10-CM

## 2018-07-02 DIAGNOSIS — I89.0 ACQUIRED LYMPHEDEMA OF LOWER EXTREMITY: ICD-10-CM

## 2018-07-02 DIAGNOSIS — J18.9 COMMUNITY ACQUIRED PNEUMONIA, UNSPECIFIED LATERALITY: ICD-10-CM

## 2018-07-02 DIAGNOSIS — E11.52 TYPE 2 DIABETES MELLITUS WITH DIABETIC PERIPHERAL ANGIOPATHY AND GANGRENE, WITHOUT LONG-TERM CURRENT USE OF INSULIN (H): ICD-10-CM

## 2018-07-02 DIAGNOSIS — I25.10 CORONARY ARTERY DISEASE DUE TO LIPID RICH PLAQUE: ICD-10-CM

## 2018-07-02 DIAGNOSIS — I50.30 HEART FAILURE WITH PRESERVED EJECTION FRACTION (H): ICD-10-CM

## 2018-07-02 NOTE — PLAN OF CARE
Problem: Patient Care Overview  Goal: Plan of Care/Patient Progress Review    Occupational Therapy Discharge Summary    Reason for therapy discharge:    Discharged to transitional care facility.    Progress towards therapy goal(s). See goals on Care Plan in [facility] health record for goal details.  Goals partially met.  Barriers to achieving goals:   discharge from facility.    Therapy recommendation(s):    Continued therapy is recommended.  Rationale/Recommendations:  continued OT at TCU to increase ind in ADLS/IADLS and work towards unmet goals.

## 2018-07-03 ENCOUNTER — AMBULATORY - HEALTHEAST (OUTPATIENT)
Dept: GERIATRICS | Facility: CLINIC | Age: 73
End: 2018-07-03

## 2018-07-03 LAB
BACTERIA SPEC CULT: NO GROWTH
BACTERIA SPEC CULT: NO GROWTH
Lab: NORMAL
Lab: NORMAL
SPECIMEN SOURCE: NORMAL
SPECIMEN SOURCE: NORMAL

## 2018-07-05 ENCOUNTER — RECORDS - HEALTHEAST (OUTPATIENT)
Dept: LAB | Facility: CLINIC | Age: 73
End: 2018-07-05

## 2018-07-06 LAB — INR PPP: 1.34 (ref 0.9–1.1)

## 2018-07-10 ENCOUNTER — RECORDS - HEALTHEAST (OUTPATIENT)
Dept: LAB | Facility: CLINIC | Age: 73
End: 2018-07-10

## 2018-07-10 LAB — INR PPP: 1.19 (ref 0.9–1.1)
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
## 2018-07-12 ENCOUNTER — RECORDS - HEALTHEAST (OUTPATIENT)
Dept: LAB | Facility: CLINIC | Age: 73
End: 2018-07-12

## 2018-07-12 ENCOUNTER — OFFICE VISIT - HEALTHEAST (OUTPATIENT)
Dept: VASCULAR SURGERY | Facility: CLINIC | Age: 73
End: 2018-07-12

## 2018-07-12 DIAGNOSIS — G63 POLYNEUROPATHY ASSOCIATED WITH UNDERLYING DISEASE (H): ICD-10-CM

## 2018-07-12 DIAGNOSIS — E11.52 TYPE 2 DIABETES MELLITUS WITH DIABETIC PERIPHERAL ANGIOPATHY AND GANGRENE, WITHOUT LONG-TERM CURRENT USE OF INSULIN (H): ICD-10-CM

## 2018-07-12 DIAGNOSIS — I89.0 ACQUIRED LYMPHEDEMA OF LOWER EXTREMITY: ICD-10-CM

## 2018-07-12 LAB
Lab: NORMAL
SPECIMEN SOURCE: NORMAL
YEAST SPEC QL CULT: NO GROWTH

## 2018-07-13 LAB — INR PPP: 1.19 (ref 0.9–1.1)

## 2018-07-19 ENCOUNTER — RECORDS - HEALTHEAST (OUTPATIENT)
Dept: LAB | Facility: CLINIC | Age: 73
End: 2018-07-19

## 2018-07-19 LAB — INR PPP: 2.08 (ref 0.9–1.1)

## 2018-07-26 ENCOUNTER — RECORDS - HEALTHEAST (OUTPATIENT)
Dept: LAB | Facility: CLINIC | Age: 73
End: 2018-07-26

## 2018-07-26 LAB
C DIFF TOX B STL QL: POSITIVE
INR PPP: 3.4 (ref 0.9–1.1)
RIBOTYPE 027/NAP1/BI: ABNORMAL

## 2018-08-02 ENCOUNTER — RECORDS - HEALTHEAST (OUTPATIENT)
Dept: LAB | Facility: CLINIC | Age: 73
End: 2018-08-02

## 2018-08-02 LAB — INR PPP: 1.77 (ref 0.9–1.1)

## 2018-08-07 ENCOUNTER — RECORDS - HEALTHEAST (OUTPATIENT)
Dept: LAB | Facility: CLINIC | Age: 73
End: 2018-08-07

## 2018-08-07 LAB — INR PPP: 2.71 (ref 0.9–1.1)

## 2018-08-17 ENCOUNTER — RECORDS - HEALTHEAST (OUTPATIENT)
Dept: LAB | Facility: CLINIC | Age: 73
End: 2018-08-17

## 2018-08-17 LAB — INR PPP: 1.51 (ref 0.9–1.1)

## 2018-08-23 ENCOUNTER — RECORDS - HEALTHEAST (OUTPATIENT)
Dept: LAB | Facility: CLINIC | Age: 73
End: 2018-08-23

## 2018-08-23 LAB — INR PPP: 2.35 (ref 0.9–1.1)

## 2018-08-30 ENCOUNTER — RECORDS - HEALTHEAST (OUTPATIENT)
Dept: LAB | Facility: CLINIC | Age: 73
End: 2018-08-30

## 2018-08-30 LAB — INR PPP: 2.76 (ref 0.9–1.1)

## 2018-08-31 ENCOUNTER — RECORDS - HEALTHEAST (OUTPATIENT)
Dept: LAB | Facility: CLINIC | Age: 73
End: 2018-08-31

## 2018-08-31 LAB
C DIFF TOX B STL QL: NEGATIVE
RIBOTYPE 027/NAP1/BI: NORMAL

## 2018-09-07 ENCOUNTER — RECORDS - HEALTHEAST (OUTPATIENT)
Dept: LAB | Facility: CLINIC | Age: 73
End: 2018-09-07

## 2018-09-07 LAB — INR PPP: 1.36 (ref 0.9–1.1)

## 2018-09-11 ENCOUNTER — RECORDS - HEALTHEAST (OUTPATIENT)
Dept: LAB | Facility: CLINIC | Age: 73
End: 2018-09-11

## 2018-09-11 LAB — INR PPP: 1.4 (ref 0.9–1.1)

## 2018-09-14 ENCOUNTER — RECORDS - HEALTHEAST (OUTPATIENT)
Dept: LAB | Facility: CLINIC | Age: 73
End: 2018-09-14

## 2018-09-14 LAB — INR PPP: 1.51 (ref 0.9–1.1)

## 2018-09-18 ENCOUNTER — RECORDS - HEALTHEAST (OUTPATIENT)
Dept: LAB | Facility: CLINIC | Age: 73
End: 2018-09-18

## 2018-09-18 LAB — INR PPP: 1.71 (ref 0.9–1.1)

## 2018-09-24 ENCOUNTER — RECORDS - HEALTHEAST (OUTPATIENT)
Dept: LAB | Facility: CLINIC | Age: 73
End: 2018-09-24

## 2018-09-25 LAB — INR PPP: 2.51 (ref 0.9–1.1)

## 2018-09-29 ENCOUNTER — HOSPITAL ENCOUNTER (INPATIENT)
Dept: CARDIOLOGY | Facility: CLINIC | Age: 73
Discharge: NURSING, REHAB OR LONG-TERM CARE | End: 2018-10-10
Attending: HOSPITALIST | Admitting: INTERNAL MEDICINE
Payer: MEDICARE

## 2018-09-29 DIAGNOSIS — E87.5 HYPERKALEMIA: ICD-10-CM

## 2018-09-29 DIAGNOSIS — I48.92 ATRIAL FLUTTER (H): ICD-10-CM

## 2018-09-29 DIAGNOSIS — I25.10 CORONARY ARTERY DISEASE DUE TO LIPID RICH PLAQUE: ICD-10-CM

## 2018-09-29 DIAGNOSIS — Z95.1 S/P CABG (CORONARY ARTERY BYPASS GRAFT): ICD-10-CM

## 2018-09-29 DIAGNOSIS — I25.83 CORONARY ARTERY DISEASE DUE TO LIPID RICH PLAQUE: ICD-10-CM

## 2018-09-29 DIAGNOSIS — R07.9 ACUTE CHEST PAIN: ICD-10-CM

## 2018-09-29 DIAGNOSIS — Z95.1 S/P CABG X 3: ICD-10-CM

## 2018-09-29 DIAGNOSIS — I21.4 NON-STEMI (NON-ST ELEVATED MYOCARDIAL INFARCTION) (H): ICD-10-CM

## 2018-09-29 LAB
ALBUMIN SERPL-MCNC: 3.7 G/DL (ref 3.5–5)
ALP SERPL-CCNC: 103 U/L (ref 45–120)
ALT SERPL W P-5'-P-CCNC: 12 U/L (ref 0–45)
ANION GAP SERPL CALCULATED.3IONS-SCNC: 10 MMOL/L (ref 5–18)
ANION GAP SERPL CALCULATED.3IONS-SCNC: 12 MMOL/L (ref 5–18)
APTT PPP: 33 SECONDS (ref 24–37)
AST SERPL W P-5'-P-CCNC: 17 U/L (ref 0–40)
ATRIAL RATE - MUSE: 278 BPM
BILIRUB SERPL-MCNC: 1 MG/DL (ref 0–1)
BNP SERPL-MCNC: 40 PG/ML (ref 0–72)
BUN SERPL-MCNC: 25 MG/DL (ref 8–28)
BUN SERPL-MCNC: 27 MG/DL (ref 8–28)
CALCIUM SERPL-MCNC: 9.5 MG/DL (ref 8.5–10.5)
CALCIUM SERPL-MCNC: 9.6 MG/DL (ref 8.5–10.5)
CHLORIDE BLD-SCNC: 105 MMOL/L (ref 98–107)
CHLORIDE BLD-SCNC: 106 MMOL/L (ref 98–107)
CO2 SERPL-SCNC: 18 MMOL/L (ref 22–31)
CO2 SERPL-SCNC: 25 MMOL/L (ref 22–31)
CREAT SERPL-MCNC: 1.42 MG/DL (ref 0.7–1.3)
CREAT SERPL-MCNC: 1.57 MG/DL (ref 0.7–1.3)
D DIMER PPP FEU-MCNC: 0.31 FEU UG/ML
DIASTOLIC BLOOD PRESSURE - MUSE: 86 MMHG
ERYTHROCYTE [DISTWIDTH] IN BLOOD BY AUTOMATED COUNT: 13.2 % (ref 11–14.5)
ERYTHROCYTE [DISTWIDTH] IN BLOOD BY AUTOMATED COUNT: 13.2 % (ref 11–14.5)
GFR SERPL CREATININE-BSD FRML MDRD: 44 ML/MIN/1.73M2
GFR SERPL CREATININE-BSD FRML MDRD: 49 ML/MIN/1.73M2
GLUCOSE BLD-MCNC: 189 MG/DL (ref 70–125)
GLUCOSE BLD-MCNC: 269 MG/DL (ref 70–125)
GLUCOSE BLDC GLUCOMTR-MCNC: 179 MG/DL
GLUCOSE BLDC GLUCOMTR-MCNC: 189 MG/DL
GLUCOSE BLDC GLUCOMTR-MCNC: 192 MG/DL
GLUCOSE BLDC GLUCOMTR-MCNC: 208 MG/DL
GLUCOSE BLDC GLUCOMTR-MCNC: 214 MG/DL
GLUCOSE BLDC GLUCOMTR-MCNC: 258 MG/DL
HCT VFR BLD AUTO: 40.1 % (ref 40–54)
HCT VFR BLD AUTO: 41.1 % (ref 40–54)
HGB BLD-MCNC: 12.9 G/DL (ref 14–18)
HGB BLD-MCNC: 13.4 G/DL (ref 14–18)
INR PPP: 1.62 (ref 0.9–1.1)
INR PPP: 1.73 (ref 0.9–1.1)
INTERPRETATION ECG - MUSE: NORMAL
MCH RBC QN AUTO: 27.9 PG (ref 27–34)
MCH RBC QN AUTO: 28.1 PG (ref 27–34)
MCHC RBC AUTO-ENTMCNC: 32.2 G/DL (ref 32–36)
MCHC RBC AUTO-ENTMCNC: 32.6 G/DL (ref 32–36)
MCV RBC AUTO: 86 FL (ref 80–100)
MCV RBC AUTO: 87 FL (ref 80–100)
P AXIS - MUSE: NORMAL DEGREES
PLATELET # BLD AUTO: 139 THOU/UL (ref 140–440)
PLATELET # BLD AUTO: 144 THOU/UL (ref 140–440)
PMV BLD AUTO: 10.4 FL (ref 8.5–12.5)
PMV BLD AUTO: 10.5 FL (ref 8.5–12.5)
POTASSIUM BLD-SCNC: 4.9 MMOL/L (ref 3.5–5)
POTASSIUM BLD-SCNC: 5.2 MMOL/L (ref 3.5–5)
PR INTERVAL - MUSE: NORMAL MS
PROT SERPL-MCNC: 7.6 G/DL (ref 6–8)
QRS DURATION - MUSE: 88 MS
QT - MUSE: 390 MS
QTC - MUSE: 435 MS
R AXIS - MUSE: 45 DEGREES
RBC # BLD AUTO: 4.63 MILL/UL (ref 4.4–6.2)
RBC # BLD AUTO: 4.77 MILL/UL (ref 4.4–6.2)
SODIUM SERPL-SCNC: 136 MMOL/L (ref 136–145)
SODIUM SERPL-SCNC: 140 MMOL/L (ref 136–145)
SYSTOLIC BLOOD PRESSURE - MUSE: 146 MMHG
T AXIS - MUSE: 64 DEGREES
TROPONIN I SERPL-MCNC: 0.02 NG/ML (ref 0–0.29)
TROPONIN I SERPL-MCNC: 0.47 NG/ML (ref 0–0.29)
TROPONIN I SERPL-MCNC: 1.4 NG/ML (ref 0–0.29)
UFH PPP CHRO-ACNC: 0.22 IU/ML (ref 0.3–0.7)
UFH PPP CHRO-ACNC: 0.31 IU/ML (ref 0.3–0.7)
VENTRICULAR RATE- MUSE: 75 BPM
WBC: 7.6 THOU/UL (ref 4–11)
WBC: 8.5 THOU/UL (ref 4–11)

## 2018-09-29 ASSESSMENT — MIFFLIN-ST. JEOR
SCORE: 1675.65
SCORE: 1666.12
SCORE: 1675.65
SCORE: 1675.65
SCORE: 1666.12
SCORE: 1666.12

## 2018-09-30 LAB
ANION GAP SERPL CALCULATED.3IONS-SCNC: 8 MMOL/L (ref 5–18)
AORTIC ROOT: 3.5 CM
ASCENDING AORTA: 3.4 CM
BSA FOR ECHO PROCEDURE: 2.15 M2
BUN SERPL-MCNC: 21 MG/DL (ref 8–28)
CALCIUM SERPL-MCNC: 9.2 MG/DL (ref 8.5–10.5)
CHLORIDE BLD-SCNC: 104 MMOL/L (ref 98–107)
CO2 SERPL-SCNC: 23 MMOL/L (ref 22–31)
CREAT SERPL-MCNC: 1.2 MG/DL (ref 0.7–1.3)
CV BLOOD PRESSURE: NORMAL MMHG
CV ECHO HEIGHT: 72 IN
CV ECHO WEIGHT: 199 LBS
DOP CALC LVOT AREA: 3.14 CM2
DOP CALC LVOT DIAMETER: 2 CM
DOP CALC LVOT PEAK VEL: 86.3 CM/S
DOP CALC LVOT STROKE VOLUME: 60 CM3
DOP CALCLVOT PEAK VEL VTI: 19.1 CM
ECHO EJECTION FRACTION ESTIMATED: 45 %
EJECTION FRACTION: 54 % (ref 55–75)
FRACTIONAL SHORTENING: 28.2 % (ref 28–44)
GFR SERPL CREATININE-BSD FRML MDRD: 59 ML/MIN/1.73M2
GLUCOSE BLD-MCNC: 243 MG/DL (ref 70–125)
GLUCOSE BLDC GLUCOMTR-MCNC: 177 MG/DL
GLUCOSE BLDC GLUCOMTR-MCNC: 195 MG/DL
GLUCOSE BLDC GLUCOMTR-MCNC: 208 MG/DL
GLUCOSE BLDC GLUCOMTR-MCNC: 227 MG/DL
INTERVENTRICULAR SEPTUM IN END DIASTOLE: 1.33 CM (ref 0.6–1)
IVS/PW RATIO: 1
LA AREA 1: 20.6 CM2
LA AREA 2: 21.1 CM2
LEFT ATRIUM LENGTH: 5.4 CM
LEFT ATRIUM SIZE: 4.4 CM
LEFT ATRIUM TO AORTIC ROOT RATIO: 1.26 NO UNITS
LEFT ATRIUM VOLUME INDEX: 31.8 ML/M2
LEFT ATRIUM VOLUME: 68.4 ML
LEFT VENTRICLE CARDIAC INDEX: 1.7 L/MIN/M2
LEFT VENTRICLE CARDIAC OUTPUT: 3.6 L/MIN
LEFT VENTRICLE DIASTOLIC VOLUME INDEX: 40 CM3/M2 (ref 34–74)
LEFT VENTRICLE DIASTOLIC VOLUME: 85.9 CM3 (ref 62–150)
LEFT VENTRICLE HEART RATE: 60 BPM
LEFT VENTRICLE MASS INDEX: 133.5 G/M2
LEFT VENTRICLE SYSTOLIC VOLUME INDEX: 18.4 CM3/M2 (ref 11–31)
LEFT VENTRICLE SYSTOLIC VOLUME: 39.6 CM3 (ref 21–61)
LEFT VENTRICULAR INTERNAL DIMENSION IN DIASTOLE: 5.14 CM (ref 4.2–5.8)
LEFT VENTRICULAR INTERNAL DIMENSION IN SYSTOLE: 3.69 CM (ref 2.5–4)
LEFT VENTRICULAR MASS: 287 G
LEFT VENTRICULAR OUTFLOW TRACT MEAN GRADIENT: 2 MMHG
LEFT VENTRICULAR OUTFLOW TRACT MEAN VELOCITY: 57.5 CM/S
LEFT VENTRICULAR OUTFLOW TRACT PEAK GRADIENT: 3 MMHG
LEFT VENTRICULAR POSTERIOR WALL IN END DIASTOLE: 1.36 CM (ref 0.6–1)
LV STROKE VOLUME INDEX: 27.9 ML/M2
MITRAL VALVE E/A RATIO: 2.3
MV AVERAGE E/E' RATIO: 11.6 CM/S
MV DECELERATION TIME: 137 MS
MV E'TISSUE VEL-LAT: 13.4 CM/S
MV E'TISSUE VEL-MED: 6.71 CM/S
MV LATERAL E/E' RATIO: 8.7
MV MEDIAL E/E' RATIO: 17.4
MV PEAK A VELOCITY: 50.2 CM/S
MV PEAK E VELOCITY: 117 CM/S
NUC REST DIASTOLIC VOLUME INDEX: 3179.2 LBS
NUC REST SYSTOLIC VOLUME INDEX: 72 IN
POTASSIUM BLD-SCNC: 4.6 MMOL/L (ref 3.5–5)
SODIUM SERPL-SCNC: 135 MMOL/L (ref 136–145)
TRICUSPID REGURGITATION PEAK PRESSURE GRADIENT: 37.5 MMHG
TRICUSPID VALVE ANULAR PLANE SYSTOLIC EXCURSION: 1.3 CM
TRICUSPID VALVE PEAK REGURGITANT VELOCITY: 306 CM/S
TROPONIN I SERPL-MCNC: 2.45 NG/ML (ref 0–0.29)
UFH PPP CHRO-ACNC: 0.22 IU/ML (ref 0.3–0.7)
UFH PPP CHRO-ACNC: 0.24 IU/ML (ref 0.3–0.7)
UFH PPP CHRO-ACNC: 0.25 IU/ML (ref 0.3–0.7)

## 2018-09-30 ASSESSMENT — MIFFLIN-ST. JEOR
SCORE: 1669.3

## 2018-10-01 ENCOUNTER — SURGERY - HEALTHEAST (OUTPATIENT)
Dept: CARDIOLOGY | Facility: CLINIC | Age: 73
End: 2018-10-01

## 2018-10-01 LAB
ACT BLD: 218 SECONDS (ref 105–167)
ANION GAP SERPL CALCULATED.3IONS-SCNC: 8 MMOL/L (ref 5–18)
BUN SERPL-MCNC: 15 MG/DL (ref 8–28)
CALCIUM SERPL-MCNC: 9.7 MG/DL (ref 8.5–10.5)
CHLORIDE BLD-SCNC: 104 MMOL/L (ref 98–107)
CO2 SERPL-SCNC: 25 MMOL/L (ref 22–31)
CREAT SERPL-MCNC: 1.13 MG/DL (ref 0.7–1.3)
GFR SERPL CREATININE-BSD FRML MDRD: >60 ML/MIN/1.73M2
GLUCOSE BLD-MCNC: 115 MG/DL (ref 70–125)
GLUCOSE BLDC GLUCOMTR-MCNC: 121 MG/DL
GLUCOSE BLDC GLUCOMTR-MCNC: 141 MG/DL
GLUCOSE BLDC GLUCOMTR-MCNC: 161 MG/DL
GLUCOSE BLDC GLUCOMTR-MCNC: 187 MG/DL
HGB BLD-MCNC: 12.4 G/DL (ref 14–18)
INR PPP: 1.37 (ref 0.9–1.1)
PLATELET # BLD AUTO: 123 THOU/UL (ref 140–440)
POTASSIUM BLD-SCNC: 4.5 MMOL/L (ref 3.5–5)
SODIUM SERPL-SCNC: 137 MMOL/L (ref 136–145)
UFH PPP CHRO-ACNC: 0.16 IU/ML (ref 0.3–0.7)
UFH PPP CHRO-ACNC: 0.32 IU/ML (ref 0.3–0.7)
UFH PPP CHRO-ACNC: 0.37 IU/ML (ref 0.3–0.7)

## 2018-10-02 LAB
ANION GAP SERPL CALCULATED.3IONS-SCNC: 10 MMOL/L (ref 5–18)
BASOPHILS # BLD AUTO: 0.1 THOU/UL (ref 0–0.2)
BASOPHILS NFR BLD AUTO: 1 % (ref 0–2)
BUN SERPL-MCNC: 17 MG/DL (ref 8–28)
CALCIUM SERPL-MCNC: 9.4 MG/DL (ref 8.5–10.5)
CHLORIDE BLD-SCNC: 107 MMOL/L (ref 98–107)
CO2 SERPL-SCNC: 21 MMOL/L (ref 22–31)
CREAT SERPL-MCNC: 1.34 MG/DL (ref 0.7–1.3)
EOSINOPHIL # BLD AUTO: 0.4 THOU/UL (ref 0–0.4)
EOSINOPHIL NFR BLD AUTO: 5 % (ref 0–6)
ERYTHROCYTE [DISTWIDTH] IN BLOOD BY AUTOMATED COUNT: 13.5 % (ref 11–14.5)
GFR SERPL CREATININE-BSD FRML MDRD: 52 ML/MIN/1.73M2
GLUCOSE BLD-MCNC: 122 MG/DL (ref 70–125)
GLUCOSE BLDC GLUCOMTR-MCNC: 110 MG/DL
GLUCOSE BLDC GLUCOMTR-MCNC: 131 MG/DL
GLUCOSE BLDC GLUCOMTR-MCNC: 159 MG/DL
GLUCOSE BLDC GLUCOMTR-MCNC: 253 MG/DL
HCT VFR BLD AUTO: 36 % (ref 40–54)
HGB BLD-MCNC: 11.7 G/DL (ref 14–18)
LYMPHOCYTES # BLD AUTO: 1.1 THOU/UL (ref 0.8–4.4)
LYMPHOCYTES NFR BLD AUTO: 17 % (ref 20–40)
MCH RBC QN AUTO: 27.9 PG (ref 27–34)
MCHC RBC AUTO-ENTMCNC: 32.5 G/DL (ref 32–36)
MCV RBC AUTO: 86 FL (ref 80–100)
MONOCYTES # BLD AUTO: 0.7 THOU/UL (ref 0–0.9)
MONOCYTES NFR BLD AUTO: 10 % (ref 2–10)
NEUTROPHILS # BLD AUTO: 4.6 THOU/UL (ref 2–7.7)
NEUTROPHILS NFR BLD AUTO: 68 % (ref 50–70)
PLATELET # BLD AUTO: 127 THOU/UL (ref 140–440)
PMV BLD AUTO: 10.3 FL (ref 8.5–12.5)
POTASSIUM BLD-SCNC: 4.4 MMOL/L (ref 3.5–5)
PROCALCITONIN SERPL-MCNC: 0.06 NG/ML (ref 0–0.49)
RBC # BLD AUTO: 4.2 MILL/UL (ref 4.4–6.2)
SODIUM SERPL-SCNC: 138 MMOL/L (ref 136–145)
UFH PPP CHRO-ACNC: 0.21 IU/ML (ref 0.3–0.7)
UFH PPP CHRO-ACNC: 0.31 IU/ML (ref 0.3–0.7)
UFH PPP CHRO-ACNC: 0.33 IU/ML (ref 0.3–0.7)
WBC: 6.8 THOU/UL (ref 4–11)

## 2018-10-02 ASSESSMENT — MIFFLIN-ST. JEOR
SCORE: 1653.42

## 2018-10-03 ENCOUNTER — ANESTHESIA - HEALTHEAST (OUTPATIENT)
Dept: SURGERY | Facility: CLINIC | Age: 73
End: 2018-10-03

## 2018-10-03 LAB
ABO/RH(D): NORMAL
ANION GAP SERPL CALCULATED.3IONS-SCNC: 9 MMOL/L (ref 5–18)
ANTIBODY SCREEN: NEGATIVE
BACTERIA SPEC CULT: NORMAL
BASOPHILS # BLD AUTO: 0 THOU/UL (ref 0–0.2)
BASOPHILS NFR BLD AUTO: 1 % (ref 0–2)
BUN SERPL-MCNC: 20 MG/DL (ref 8–28)
CALCIUM SERPL-MCNC: 9.4 MG/DL (ref 8.5–10.5)
CHLORIDE BLD-SCNC: 108 MMOL/L (ref 98–107)
CO2 SERPL-SCNC: 23 MMOL/L (ref 22–31)
CREAT SERPL-MCNC: 1.39 MG/DL (ref 0.7–1.3)
EOSINOPHIL # BLD AUTO: 0.3 THOU/UL (ref 0–0.4)
EOSINOPHIL NFR BLD AUTO: 5 % (ref 0–6)
ERYTHROCYTE [DISTWIDTH] IN BLOOD BY AUTOMATED COUNT: 13.6 % (ref 11–14.5)
GFR SERPL CREATININE-BSD FRML MDRD: 50 ML/MIN/1.73M2
GLUCOSE BLD-MCNC: 130 MG/DL (ref 70–125)
GLUCOSE BLDC GLUCOMTR-MCNC: 128 MG/DL
GLUCOSE BLDC GLUCOMTR-MCNC: 151 MG/DL
GLUCOSE BLDC GLUCOMTR-MCNC: 153 MG/DL
GLUCOSE BLDC GLUCOMTR-MCNC: 89 MG/DL
HCT VFR BLD AUTO: 34.6 % (ref 40–54)
HGB BLD-MCNC: 10.9 G/DL (ref 14–18)
LYMPHOCYTES # BLD AUTO: 1.1 THOU/UL (ref 0.8–4.4)
LYMPHOCYTES NFR BLD AUTO: 17 % (ref 20–40)
MAGNESIUM SERPL-MCNC: 2 MG/DL (ref 1.8–2.6)
MCH RBC QN AUTO: 27.3 PG (ref 27–34)
MCHC RBC AUTO-ENTMCNC: 31.5 G/DL (ref 32–36)
MCV RBC AUTO: 87 FL (ref 80–100)
MONOCYTES # BLD AUTO: 0.7 THOU/UL (ref 0–0.9)
MONOCYTES NFR BLD AUTO: 11 % (ref 2–10)
NEUTROPHILS # BLD AUTO: 4.2 THOU/UL (ref 2–7.7)
NEUTROPHILS NFR BLD AUTO: 67 % (ref 50–70)
PLATELET # BLD AUTO: 118 THOU/UL (ref 140–440)
PMV BLD AUTO: 10.4 FL (ref 8.5–12.5)
POTASSIUM BLD-SCNC: 4.2 MMOL/L (ref 3.5–5)
RBC # BLD AUTO: 4 MILL/UL (ref 4.4–6.2)
SODIUM SERPL-SCNC: 140 MMOL/L (ref 136–145)
UFH PPP CHRO-ACNC: 0.24 IU/ML (ref 0.3–0.7)
UFH PPP CHRO-ACNC: 0.28 IU/ML (ref 0.3–0.7)
UFH PPP CHRO-ACNC: 0.34 IU/ML (ref 0.3–0.7)
UFH PPP CHRO-ACNC: 0.4 IU/ML (ref 0.3–0.7)
WBC: 6.3 THOU/UL (ref 4–11)

## 2018-10-03 ASSESSMENT — MIFFLIN-ST. JEOR
SCORE: 1651.61

## 2018-10-04 ENCOUNTER — SURGERY - HEALTHEAST (OUTPATIENT)
Dept: SURGERY | Facility: CLINIC | Age: 73
End: 2018-10-04

## 2018-10-04 LAB
ALBUMIN UR-MCNC: NEGATIVE MG/DL
ANION GAP SERPL CALCULATED.3IONS-SCNC: 8 MMOL/L (ref 5–18)
ANION GAP SERPL CALCULATED.3IONS-SCNC: 8 MMOL/L (ref 5–18)
APPEARANCE UR: CLEAR
APTT PPP: 38 SECONDS (ref 24–37)
APTT PPP: 38 SECONDS (ref 24–37)
BACTERIA #/AREA URNS HPF: ABNORMAL HPF
BASE EXCESS ARTERIAL, I-STAT - HISTORICAL: -1 MMOL/L
BASE EXCESS ARTERIAL, I-STAT - HISTORICAL: -6 MMOL/L
BASE EXCESS ARTERIAL, I-STAT - HISTORICAL: -6 MMOL/L
BASE EXCESS ARTERIAL, I-STAT - HISTORICAL: 0 MMOL/L
BASE EXCESS ARTERIAL, I-STAT - HISTORICAL: 1 MMOL/L
BASE EXCESS BLDA CALC-SCNC: -3.5 MMOL/L
BASE EXCESS BLDA CALC-SCNC: -7 MMOL/L
BASOPHILS # BLD AUTO: 0 THOU/UL (ref 0–0.2)
BASOPHILS # BLD AUTO: 0 THOU/UL (ref 0–0.2)
BASOPHILS NFR BLD AUTO: 0 % (ref 0–2)
BASOPHILS NFR BLD AUTO: 1 % (ref 0–2)
BILIRUB UR QL STRIP: NEGATIVE
BUN SERPL-MCNC: 17 MG/DL (ref 8–28)
BUN SERPL-MCNC: 20 MG/DL (ref 8–28)
CA-I BLD-MCNC: 1.07 MMOL/L (ref 1.11–1.3)
CA-I BLD-MCNC: 1.07 MMOL/L (ref 1.11–1.3)
CA-I BLD-MCNC: 1.1 MMOL/L (ref 1.11–1.3)
CA-I BLD-MCNC: 1.1 MMOL/L (ref 1.11–1.3)
CA-I BLD-MCNC: 1.16 MMOL/L (ref 1.11–1.3)
CA-I BLD-MCNC: 1.23 MMOL/L (ref 1.11–1.3)
CA-I BLD-MCNC: 1.24 MMOL/L (ref 1.11–1.3)
CALCIUM SERPL-MCNC: 8.4 MG/DL (ref 8.5–10.5)
CALCIUM SERPL-MCNC: 9.4 MG/DL (ref 8.5–10.5)
CALCIUM, IONIZED MEASURED: 1.13 MMOL/L (ref 1.11–1.3)
CHLORIDE BLD-SCNC: 105 MMOL/L (ref 98–107)
CHLORIDE BLD-SCNC: 114 MMOL/L (ref 98–107)
CO2 BLD-SCNC: 22 MMOL/L
CO2 BLD-SCNC: 23 MMOL/L
CO2 BLD-SCNC: 25 MMOL/L
CO2 BLD-SCNC: 26 MMOL/L
CO2 BLD-SCNC: 26 MMOL/L
CO2 BLD-SCNC: 27 MMOL/L
CO2 BLD-SCNC: 27 MMOL/L
CO2 SERPL-SCNC: 21 MMOL/L (ref 22–31)
CO2 SERPL-SCNC: 22 MMOL/L (ref 22–31)
COHGB MFR BLD: 100 % (ref 95–96)
COHGB MFR BLD: 99.2 % (ref 95–96)
COLOR UR AUTO: YELLOW
CREAT SERPL-MCNC: 1.15 MG/DL (ref 0.7–1.3)
CREAT SERPL-MCNC: 1.37 MG/DL (ref 0.7–1.3)
EOSINOPHIL # BLD AUTO: 0.1 THOU/UL (ref 0–0.4)
EOSINOPHIL # BLD AUTO: 0.3 THOU/UL (ref 0–0.4)
EOSINOPHIL NFR BLD AUTO: 2 % (ref 0–6)
EOSINOPHIL NFR BLD AUTO: 4 % (ref 0–6)
ERYTHROCYTE [DISTWIDTH] IN BLOOD BY AUTOMATED COUNT: 13.8 % (ref 11–14.5)
ERYTHROCYTE [DISTWIDTH] IN BLOOD BY AUTOMATED COUNT: 13.8 % (ref 11–14.5)
FIBRINOGEN PPP-MCNC: 322 MG/DL (ref 170–510)
GFR SERPL CREATININE-BSD FRML MDRD: 51 ML/MIN/1.73M2
GFR SERPL CREATININE-BSD FRML MDRD: >60 ML/MIN/1.73M2
GLUCOSE BLD-MCNC: 113 MG/DL (ref 70–125)
GLUCOSE BLD-MCNC: 99 MG/DL (ref 70–125)
GLUCOSE BLDC GLUCOMTR-MCNC: 106 MG/DL
GLUCOSE BLDC GLUCOMTR-MCNC: 118 MG/DL
GLUCOSE BLDC GLUCOMTR-MCNC: 121 MG/DL
GLUCOSE BLDC GLUCOMTR-MCNC: 137 MG/DL
GLUCOSE BLDC GLUCOMTR-MCNC: 143 MG/DL
GLUCOSE BLDC GLUCOMTR-MCNC: 154 MG/DL
GLUCOSE BLDC GLUCOMTR-MCNC: 171 MG/DL
GLUCOSE BLDC GLUCOMTR-MCNC: 171 MG/DL
GLUCOSE BLDC GLUCOMTR-MCNC: 172 MG/DL
GLUCOSE BLDC GLUCOMTR-MCNC: 174 MG/DL
GLUCOSE BLDC GLUCOMTR-MCNC: 176 MG/DL
GLUCOSE BLDC GLUCOMTR-MCNC: 179 MG/DL
GLUCOSE BLDC GLUCOMTR-MCNC: 194 MG/DL
GLUCOSE BLDC GLUCOMTR-MCNC: 199 MG/DL
GLUCOSE BLDC GLUCOMTR-MCNC: 94 MG/DL
GLUCOSE UR STRIP-MCNC: NEGATIVE MG/DL
HCO3 BLDA-SCNC: 20.4 MMOL/L
HCO3 BLDA-SCNC: 21.5 MMOL/L
HCO3 BLDA-SCNC: 24.1 MMOL/L
HCO3 BLDA-SCNC: 24.3 MMOL/L
HCO3 BLDA-SCNC: 24.7 MMOL/L
HCO3 BLDA-SCNC: 25.7 MMOL/L
HCO3 BLDA-SCNC: 26.1 MMOL/L
HCO3, ARTERIAL CALC - HISTORICAL: 19.4 MMOL/L (ref 23–29)
HCO3, ARTERIAL CALC - HISTORICAL: 22.2 MMOL/L (ref 23–29)
HCT VFR BLD AUTO: 31.2 % (ref 40–54)
HCT VFR BLD AUTO: 33.5 % (ref 40–54)
HCT VFR BLD CALC: 26 %PCV
HCT VFR BLD CALC: 27 %PCV
HCT VFR BLD CALC: 28 %PCV
HCT VFR BLD CALC: 29 %PCV
HCT VFR BLD CALC: 29 %PCV
HCT VFR BLD CALC: 30 %PCV
HCT VFR BLD CALC: 33 %PCV
HGB BLD-MCNC: 10.2 G/DL
HGB BLD-MCNC: 10.8 G/DL (ref 14–18)
HGB BLD-MCNC: 11.2 G/DL
HGB BLD-MCNC: 8.8 G/DL
HGB BLD-MCNC: 9.2 G/DL
HGB BLD-MCNC: 9.5 G/DL
HGB BLD-MCNC: 9.8 G/DL (ref 14–18)
HGB BLD-MCNC: 9.9 G/DL
HGB BLD-MCNC: 9.9 G/DL
HGB UR QL STRIP: ABNORMAL
INR PPP: 1.45 (ref 0.9–1.1)
INR PPP: 1.64 (ref 0.9–1.1)
ION CA PH 7.4: 1.11 MMOL/L (ref 1.11–1.3)
KETONES UR STRIP-MCNC: NEGATIVE MG/DL
LEUKOCYTE ESTERASE UR QL STRIP: NEGATIVE
LYMPHOCYTES # BLD AUTO: 1 THOU/UL (ref 0.8–4.4)
LYMPHOCYTES # BLD AUTO: 1 THOU/UL (ref 0.8–4.4)
LYMPHOCYTES NFR BLD AUTO: 11 % (ref 20–40)
LYMPHOCYTES NFR BLD AUTO: 16 % (ref 20–40)
MAGNESIUM SERPL-MCNC: 2.6 MG/DL (ref 1.8–2.6)
MCH RBC QN AUTO: 27.3 PG (ref 27–34)
MCH RBC QN AUTO: 27.6 PG (ref 27–34)
MCHC RBC AUTO-ENTMCNC: 31.4 G/DL (ref 32–36)
MCHC RBC AUTO-ENTMCNC: 32.2 G/DL (ref 32–36)
MCV RBC AUTO: 86 FL (ref 80–100)
MCV RBC AUTO: 87 FL (ref 80–100)
MONOCYTES # BLD AUTO: 0.6 THOU/UL (ref 0–0.9)
MONOCYTES # BLD AUTO: 0.7 THOU/UL (ref 0–0.9)
MONOCYTES NFR BLD AUTO: 11 % (ref 2–10)
MONOCYTES NFR BLD AUTO: 7 % (ref 2–10)
NEUTROPHILS # BLD AUTO: 4.2 THOU/UL (ref 2–7.7)
NEUTROPHILS # BLD AUTO: 6.7 THOU/UL (ref 2–7.7)
NEUTROPHILS NFR BLD AUTO: 68 % (ref 50–70)
NEUTROPHILS NFR BLD AUTO: 80 % (ref 50–70)
NITRATE UR QL: NEGATIVE
O2/TOTAL GAS SETTING VFR VENT: 100 %
O2/TOTAL GAS SETTING VFR VENT: 40 %
OXYHEMOGLOBIN - HISTORICAL: 95.2 % (ref 95–96)
OXYHEMOGLOBIN - HISTORICAL: 97.4 % (ref 95–96)
PCO2 BLD: 37 MM HG (ref 35–45)
PCO2 BLD: 37 MM HG (ref 35–45)
PCO2 BLDA: 40 MMHG
PCO2 BLDA: 42.9 MMHG
PCO2 BLDA: 45.4 MMHG
PCO2 BLDA: 45.5 MMHG
PCO2 BLDA: 45.8 MMHG
PCO2 BLDA: 49.6 MMHG
PCO2 BLDA: 50.6 MMHG
PEEP: 5 CM H2O
PEEP: 5 CM H2O
PH BLD: 7.31 [PH] (ref 7.37–7.44)
PH BLD: 7.37 [PH] (ref 7.37–7.44)
PH UR STRIP: 6 [PH] (ref 4.5–8)
PH: 7.35 (ref 7.35–7.45)
PLATELET # BLD AUTO: 100 THOU/UL (ref 140–440)
PLATELET # BLD AUTO: 122 THOU/UL (ref 140–440)
PLATELET # BLD AUTO: 98 THOU/UL (ref 140–440)
PMV BLD AUTO: 10.2 FL (ref 8.5–12.5)
PMV BLD AUTO: 10.5 FL (ref 8.5–12.5)
PO2 BLD: 283 MM HG (ref 75–85)
PO2 BLD: 88 MM HG (ref 75–85)
PO2 BLDA: 140 MMHG
PO2 BLDA: 144 MMHG
PO2 BLDA: 159 MMHG
PO2 BLDA: 327 MMHG
PO2 BLDA: 331 MMHG
PO2 BLDA: 336 MMHG
PO2 BLDA: 47 MMHG
POC PH - HISTORICAL: 7.25
POC PH - HISTORICAL: 7.31
POC PH - HISTORICAL: 7.32
POC PH - HISTORICAL: 7.34
POC PH - HISTORICAL: 7.34
POC PH - HISTORICAL: 7.36
POC PH - HISTORICAL: 7.37
POTASSIUM BLD-SCNC: 4.1 MMOL/L (ref 3.5–5)
POTASSIUM BLD-SCNC: 4.2 MMOL/L (ref 3.5–5)
POTASSIUM BLD-SCNC: 4.2 MMOL/L (ref 3.5–5)
POTASSIUM BLD-SCNC: 4.3 MMOL/L (ref 3.5–5)
POTASSIUM BLD-SCNC: 4.5 MMOL/L (ref 3.5–5)
POTASSIUM BLD-SCNC: 5.2 MMOL/L (ref 3.5–5)
POTASSIUM BLD-SCNC: 5.3 MMOL/L (ref 3.5–5)
POTASSIUM BLD-SCNC: 5.6 MMOL/L (ref 3.5–5)
RATE: 16 RR/MIN
RATE: 16 RR/MIN
RBC # BLD AUTO: 3.59 MILL/UL (ref 4.4–6.2)
RBC # BLD AUTO: 3.92 MILL/UL (ref 4.4–6.2)
RBC #/AREA URNS AUTO: ABNORMAL HPF
SAT POCT - HISTORICAL: 100 %
SAT POCT - HISTORICAL: 78 %
SAT POCT - HISTORICAL: 99 %
SODIUM BLD-SCNC: 140 MMOL/L (ref 136–145)
SODIUM BLD-SCNC: 142 MMOL/L (ref 136–145)
SODIUM BLD-SCNC: 144 MMOL/L (ref 136–145)
SODIUM SERPL-SCNC: 135 MMOL/L (ref 136–145)
SODIUM SERPL-SCNC: 143 MMOL/L (ref 136–145)
SP GR UR STRIP: 1.02 (ref 1–1.03)
SQUAMOUS #/AREA URNS AUTO: ABNORMAL LPF
TEMPERATURE: 37 DEGREES C
TEMPERATURE: 37 DEGREES C
UFH PPP CHRO-ACNC: <=0.1 IU/ML (ref 0.3–0.7)
UROBILINOGEN UR STRIP-ACNC: ABNORMAL
VENTILATION MODE: ABNORMAL
VENTILATION MODE: ABNORMAL
VENTILATOR TIDAL VOLUME: 600 ML
VENTILATOR TIDAL VOLUME: 600 ML
WBC #/AREA URNS AUTO: ABNORMAL HPF
WBC: 6.2 THOU/UL (ref 4–11)
WBC: 8.4 THOU/UL (ref 4–11)

## 2018-10-05 LAB
ANION GAP SERPL CALCULATED.3IONS-SCNC: 9 MMOL/L (ref 5–18)
BASE EXCESS BLDA CALC-SCNC: -4.7 MMOL/L
BASE EXCESS BLDA CALC-SCNC: -6 MMOL/L
BASOPHILS # BLD AUTO: 0 THOU/UL (ref 0–0.2)
BASOPHILS NFR BLD AUTO: 0 % (ref 0–2)
BLD PROD TYP BPU: NORMAL
BLD PROD TYP BPU: NORMAL
BLOOD EXPIRATION DATE: NORMAL
BLOOD EXPIRATION DATE: NORMAL
BLOOD TYPE: 6200
BLOOD TYPE: 6200
BUN SERPL-MCNC: 20 MG/DL (ref 8–28)
CALCIUM SERPL-MCNC: 8.7 MG/DL (ref 8.5–10.5)
CALCIUM, IONIZED MEASURED: 1.18 MMOL/L (ref 1.11–1.3)
CHLORIDE BLD-SCNC: 114 MMOL/L (ref 98–107)
CO2 SERPL-SCNC: 20 MMOL/L (ref 22–31)
CODING SYSTEM: NORMAL
CODING SYSTEM: NORMAL
COHGB MFR BLD: 97.7 % (ref 95–96)
COHGB MFR BLD: 99 % (ref 95–96)
COMPONENT (HISTORICAL CONVERSION): NORMAL
COMPONENT (HISTORICAL CONVERSION): NORMAL
CREAT SERPL-MCNC: 1.55 MG/DL (ref 0.7–1.3)
CROSSMATCH: NORMAL
CROSSMATCH: NORMAL
EOSINOPHIL # BLD AUTO: 0 THOU/UL (ref 0–0.4)
EOSINOPHIL NFR BLD AUTO: 0 % (ref 0–6)
ERYTHROCYTE [DISTWIDTH] IN BLOOD BY AUTOMATED COUNT: 14.1 % (ref 11–14.5)
FLOW: 6 LPM
GFR SERPL CREATININE-BSD FRML MDRD: 44 ML/MIN/1.73M2
GLUCOSE BLD-MCNC: 126 MG/DL (ref 70–125)
GLUCOSE BLDC GLUCOMTR-MCNC: 106 MG/DL
GLUCOSE BLDC GLUCOMTR-MCNC: 110 MG/DL
GLUCOSE BLDC GLUCOMTR-MCNC: 112 MG/DL
GLUCOSE BLDC GLUCOMTR-MCNC: 118 MG/DL
GLUCOSE BLDC GLUCOMTR-MCNC: 119 MG/DL
GLUCOSE BLDC GLUCOMTR-MCNC: 126 MG/DL
GLUCOSE BLDC GLUCOMTR-MCNC: 130 MG/DL
GLUCOSE BLDC GLUCOMTR-MCNC: 131 MG/DL
GLUCOSE BLDC GLUCOMTR-MCNC: 135 MG/DL
GLUCOSE BLDC GLUCOMTR-MCNC: 137 MG/DL
GLUCOSE BLDC GLUCOMTR-MCNC: 141 MG/DL
GLUCOSE BLDC GLUCOMTR-MCNC: 145 MG/DL
GLUCOSE BLDC GLUCOMTR-MCNC: 149 MG/DL
GLUCOSE BLDC GLUCOMTR-MCNC: 162 MG/DL
GLUCOSE BLDC GLUCOMTR-MCNC: 164 MG/DL
GLUCOSE BLDC GLUCOMTR-MCNC: 168 MG/DL
GLUCOSE BLDC GLUCOMTR-MCNC: 174 MG/DL
GLUCOSE BLDC GLUCOMTR-MCNC: 174 MG/DL
GLUCOSE BLDC GLUCOMTR-MCNC: 175 MG/DL
GLUCOSE BLDC GLUCOMTR-MCNC: 194 MG/DL
HCO3, ARTERIAL CALC - HISTORICAL: 20.2 MMOL/L (ref 23–29)
HCO3, ARTERIAL CALC - HISTORICAL: 21.2 MMOL/L (ref 23–29)
HCT VFR BLD AUTO: 27.6 % (ref 40–54)
HGB BLD-MCNC: 8.8 G/DL (ref 14–18)
INR PPP: 1.48 (ref 0.9–1.1)
ION CA PH 7.4: 1.13 MMOL/L (ref 1.11–1.3)
LYMPHOCYTES # BLD AUTO: 0.6 THOU/UL (ref 0.8–4.4)
LYMPHOCYTES NFR BLD AUTO: 7 % (ref 20–40)
MAGNESIUM SERPL-MCNC: 2.4 MG/DL (ref 1.8–2.6)
MCH RBC QN AUTO: 28.3 PG (ref 27–34)
MCHC RBC AUTO-ENTMCNC: 31.9 G/DL (ref 32–36)
MCV RBC AUTO: 89 FL (ref 80–100)
MONOCYTES # BLD AUTO: 0.8 THOU/UL (ref 0–0.9)
MONOCYTES NFR BLD AUTO: 8 % (ref 2–10)
NEUTROPHILS # BLD AUTO: 7.9 THOU/UL (ref 2–7.7)
NEUTROPHILS NFR BLD AUTO: 85 % (ref 50–70)
O2/TOTAL GAS SETTING VFR VENT: 0.6 %
OXYHEMOGLOBIN - HISTORICAL: 94.9 % (ref 95–96)
OXYHEMOGLOBIN - HISTORICAL: 95 % (ref 95–96)
PCO2 BLD: 37 MM HG (ref 35–45)
PCO2 BLD: 38 MM HG (ref 35–45)
PEEP: 5 CM H2O
PH BLD: 7.32 [PH] (ref 7.37–7.44)
PH BLD: 7.35 [PH] (ref 7.37–7.44)
PH: 7.29 (ref 7.35–7.45)
PLATELET # BLD AUTO: 109 THOU/UL (ref 140–440)
PMV BLD AUTO: 10.7 FL (ref 8.5–12.5)
PO2 BLD: 71 MM HG (ref 75–85)
PO2 BLD: 82 MM HG (ref 75–85)
POTASSIUM BLD-SCNC: 4.5 MMOL/L (ref 3.5–5)
PSV (LAB BLOOD GAS): 5 CM H2O
RATE: ABNORMAL RR/MIN
RBC # BLD AUTO: 3.11 MILL/UL (ref 4.4–6.2)
SODIUM SERPL-SCNC: 143 MMOL/L (ref 136–145)
STATUS (HISTORICAL CONVERSION): NORMAL
STATUS (HISTORICAL CONVERSION): NORMAL
TEMPERATURE: 37 DEGREES C
TEMPERATURE: 37 DEGREES C
UNIT ABO/RH (HISTORICAL CONVERSION): NORMAL
UNIT ABO/RH (HISTORICAL CONVERSION): NORMAL
UNIT NUMBER: NORMAL
UNIT NUMBER: NORMAL
VENTILATION MODE: ABNORMAL
VENTILATION MODE: ABNORMAL
VENTILATOR TIDAL VOLUME: ABNORMAL ML
WBC: 9.4 THOU/UL (ref 4–11)

## 2018-10-05 ASSESSMENT — MIFFLIN-ST. JEOR
SCORE: 1707.85

## 2018-10-06 LAB
ANION GAP SERPL CALCULATED.3IONS-SCNC: 10 MMOL/L (ref 5–18)
BASOPHILS # BLD AUTO: 0 THOU/UL (ref 0–0.2)
BASOPHILS NFR BLD AUTO: 0 % (ref 0–2)
BUN SERPL-MCNC: 23 MG/DL (ref 8–28)
CALCIUM SERPL-MCNC: 9.3 MG/DL (ref 8.5–10.5)
CHLORIDE BLD-SCNC: 114 MMOL/L (ref 98–107)
CO2 SERPL-SCNC: 21 MMOL/L (ref 22–31)
CREAT SERPL-MCNC: 1.38 MG/DL (ref 0.7–1.3)
EOSINOPHIL # BLD AUTO: 0.1 THOU/UL (ref 0–0.4)
EOSINOPHIL NFR BLD AUTO: 1 % (ref 0–6)
ERYTHROCYTE [DISTWIDTH] IN BLOOD BY AUTOMATED COUNT: 14.6 % (ref 11–14.5)
GFR SERPL CREATININE-BSD FRML MDRD: 51 ML/MIN/1.73M2
GLUCOSE BLD-MCNC: 109 MG/DL (ref 70–125)
GLUCOSE BLDC GLUCOMTR-MCNC: 102 MG/DL
GLUCOSE BLDC GLUCOMTR-MCNC: 110 MG/DL
GLUCOSE BLDC GLUCOMTR-MCNC: 121 MG/DL
GLUCOSE BLDC GLUCOMTR-MCNC: 125 MG/DL
GLUCOSE BLDC GLUCOMTR-MCNC: 129 MG/DL
GLUCOSE BLDC GLUCOMTR-MCNC: 131 MG/DL
GLUCOSE BLDC GLUCOMTR-MCNC: 133 MG/DL
GLUCOSE BLDC GLUCOMTR-MCNC: 152 MG/DL
GLUCOSE BLDC GLUCOMTR-MCNC: 163 MG/DL
GLUCOSE BLDC GLUCOMTR-MCNC: 168 MG/DL
GLUCOSE BLDC GLUCOMTR-MCNC: 210 MG/DL
HCT VFR BLD AUTO: 27.9 % (ref 40–54)
HGB BLD-MCNC: 8.8 G/DL (ref 14–18)
LYMPHOCYTES # BLD AUTO: 0.8 THOU/UL (ref 0.8–4.4)
LYMPHOCYTES NFR BLD AUTO: 8 % (ref 20–40)
MAGNESIUM SERPL-MCNC: 2.1 MG/DL (ref 1.8–2.6)
MCH RBC QN AUTO: 27.9 PG (ref 27–34)
MCHC RBC AUTO-ENTMCNC: 31.5 G/DL (ref 32–36)
MCV RBC AUTO: 89 FL (ref 80–100)
MONOCYTES # BLD AUTO: 0.8 THOU/UL (ref 0–0.9)
MONOCYTES NFR BLD AUTO: 8 % (ref 2–10)
NEUTROPHILS # BLD AUTO: 7.7 THOU/UL (ref 2–7.7)
NEUTROPHILS NFR BLD AUTO: 83 % (ref 50–70)
PLATELET # BLD AUTO: 103 THOU/UL (ref 140–440)
PLATELET # BLD AUTO: 103 THOU/UL (ref 140–440)
PMV BLD AUTO: 11 FL (ref 8.5–12.5)
POTASSIUM BLD-SCNC: 4.1 MMOL/L (ref 3.5–5)
RBC # BLD AUTO: 3.15 MILL/UL (ref 4.4–6.2)
SODIUM SERPL-SCNC: 145 MMOL/L (ref 136–145)
WBC: 9.4 THOU/UL (ref 4–11)

## 2018-10-06 ASSESSMENT — MIFFLIN-ST. JEOR
SCORE: 1684.27

## 2018-10-07 LAB
ANION GAP SERPL CALCULATED.3IONS-SCNC: 12 MMOL/L (ref 5–18)
BASOPHILS # BLD AUTO: 0 THOU/UL (ref 0–0.2)
BASOPHILS NFR BLD AUTO: 1 % (ref 0–2)
BLD PROD TYP BPU: NORMAL
BLD PROD TYP BPU: NORMAL
BLOOD EXPIRATION DATE: NORMAL
BLOOD EXPIRATION DATE: NORMAL
BLOOD TYPE: 6200
BLOOD TYPE: 6200
BUN SERPL-MCNC: 28 MG/DL (ref 8–28)
CALCIUM SERPL-MCNC: 9.6 MG/DL (ref 8.5–10.5)
CHLORIDE BLD-SCNC: 108 MMOL/L (ref 98–107)
CO2 SERPL-SCNC: 21 MMOL/L (ref 22–31)
CODING SYSTEM: NORMAL
CODING SYSTEM: NORMAL
COMPONENT (HISTORICAL CONVERSION): NORMAL
COMPONENT (HISTORICAL CONVERSION): NORMAL
CREAT SERPL-MCNC: 1.37 MG/DL (ref 0.7–1.3)
CROSSMATCH: NORMAL
CROSSMATCH: NORMAL
EOSINOPHIL # BLD AUTO: 0.2 THOU/UL (ref 0–0.4)
EOSINOPHIL NFR BLD AUTO: 2 % (ref 0–6)
ERYTHROCYTE [DISTWIDTH] IN BLOOD BY AUTOMATED COUNT: 14.4 % (ref 11–14.5)
GFR SERPL CREATININE-BSD FRML MDRD: 51 ML/MIN/1.73M2
GLUCOSE BLD-MCNC: 146 MG/DL (ref 70–125)
GLUCOSE BLDC GLUCOMTR-MCNC: 115 MG/DL
GLUCOSE BLDC GLUCOMTR-MCNC: 125 MG/DL
GLUCOSE BLDC GLUCOMTR-MCNC: 148 MG/DL
GLUCOSE BLDC GLUCOMTR-MCNC: 154 MG/DL
GLUCOSE BLDC GLUCOMTR-MCNC: 162 MG/DL
GLUCOSE BLDC GLUCOMTR-MCNC: 249 MG/DL
HCT VFR BLD AUTO: 29.4 % (ref 40–54)
HGB BLD-MCNC: 8.9 G/DL (ref 14–18)
LYMPHOCYTES # BLD AUTO: 1 THOU/UL (ref 0.8–4.4)
LYMPHOCYTES NFR BLD AUTO: 12 % (ref 20–40)
MAGNESIUM SERPL-MCNC: 2.2 MG/DL (ref 1.8–2.6)
MCH RBC QN AUTO: 28 PG (ref 27–34)
MCHC RBC AUTO-ENTMCNC: 30.3 G/DL (ref 32–36)
MCV RBC AUTO: 93 FL (ref 80–100)
MONOCYTES # BLD AUTO: 0.7 THOU/UL (ref 0–0.9)
MONOCYTES NFR BLD AUTO: 8 % (ref 2–10)
NEUTROPHILS # BLD AUTO: 6.6 THOU/UL (ref 2–7.7)
NEUTROPHILS NFR BLD AUTO: 77 % (ref 50–70)
PLATELET # BLD AUTO: 124 THOU/UL (ref 140–440)
PMV BLD AUTO: 11.1 FL (ref 8.5–12.5)
POTASSIUM BLD-SCNC: 4.3 MMOL/L (ref 3.5–5)
POTASSIUM BLD-SCNC: 4.3 MMOL/L (ref 3.5–5)
RBC # BLD AUTO: 3.18 MILL/UL (ref 4.4–6.2)
SODIUM SERPL-SCNC: 141 MMOL/L (ref 136–145)
STATUS (HISTORICAL CONVERSION): NORMAL
STATUS (HISTORICAL CONVERSION): NORMAL
UNIT ABO/RH (HISTORICAL CONVERSION): NORMAL
UNIT ABO/RH (HISTORICAL CONVERSION): NORMAL
UNIT NUMBER: NORMAL
UNIT NUMBER: NORMAL
WBC: 8.6 THOU/UL (ref 4–11)

## 2018-10-07 ASSESSMENT — MIFFLIN-ST. JEOR
SCORE: 1686.99

## 2018-10-08 LAB
ANION GAP SERPL CALCULATED.3IONS-SCNC: 11 MMOL/L (ref 5–18)
BASOPHILS # BLD AUTO: 0 THOU/UL (ref 0–0.2)
BASOPHILS NFR BLD AUTO: 1 % (ref 0–2)
BUN SERPL-MCNC: 33 MG/DL (ref 8–28)
CALCIUM SERPL-MCNC: 9.6 MG/DL (ref 8.5–10.5)
CHLORIDE BLD-SCNC: 108 MMOL/L (ref 98–107)
CO2 SERPL-SCNC: 24 MMOL/L (ref 22–31)
CREAT SERPL-MCNC: 1.45 MG/DL (ref 0.7–1.3)
EOSINOPHIL # BLD AUTO: 0.3 THOU/UL (ref 0–0.4)
EOSINOPHIL NFR BLD AUTO: 4 % (ref 0–6)
ERYTHROCYTE [DISTWIDTH] IN BLOOD BY AUTOMATED COUNT: 14.4 % (ref 11–14.5)
GFR SERPL CREATININE-BSD FRML MDRD: 48 ML/MIN/1.73M2
GLUCOSE BLD-MCNC: 131 MG/DL (ref 70–125)
GLUCOSE BLDC GLUCOMTR-MCNC: 110 MG/DL
GLUCOSE BLDC GLUCOMTR-MCNC: 117 MG/DL
GLUCOSE BLDC GLUCOMTR-MCNC: 154 MG/DL
GLUCOSE BLDC GLUCOMTR-MCNC: 154 MG/DL
GLUCOSE BLDC GLUCOMTR-MCNC: 161 MG/DL
GLUCOSE BLDC GLUCOMTR-MCNC: 169 MG/DL
GLUCOSE BLDC GLUCOMTR-MCNC: 91 MG/DL
HCT VFR BLD AUTO: 27 % (ref 40–54)
HGB BLD-MCNC: 8.2 G/DL (ref 14–18)
INR PPP: 1.21 (ref 0.9–1.1)
LYMPHOCYTES # BLD AUTO: 0.9 THOU/UL (ref 0.8–4.4)
LYMPHOCYTES NFR BLD AUTO: 12 % (ref 20–40)
MAGNESIUM SERPL-MCNC: 2.4 MG/DL (ref 1.8–2.6)
MCH RBC QN AUTO: 27.4 PG (ref 27–34)
MCHC RBC AUTO-ENTMCNC: 30.4 G/DL (ref 32–36)
MCV RBC AUTO: 90 FL (ref 80–100)
MONOCYTES # BLD AUTO: 0.6 THOU/UL (ref 0–0.9)
MONOCYTES NFR BLD AUTO: 8 % (ref 2–10)
NEUTROPHILS # BLD AUTO: 5.5 THOU/UL (ref 2–7.7)
NEUTROPHILS NFR BLD AUTO: 76 % (ref 50–70)
PLATELET # BLD AUTO: 117 THOU/UL (ref 140–440)
PMV BLD AUTO: 11.1 FL (ref 8.5–12.5)
POTASSIUM BLD-SCNC: 4.1 MMOL/L (ref 3.5–5)
RBC # BLD AUTO: 2.99 MILL/UL (ref 4.4–6.2)
SODIUM SERPL-SCNC: 143 MMOL/L (ref 136–145)
WBC: 7.3 THOU/UL (ref 4–11)

## 2018-10-08 ASSESSMENT — MIFFLIN-ST. JEOR
SCORE: 1666.58

## 2018-10-09 LAB
ANION GAP SERPL CALCULATED.3IONS-SCNC: 9 MMOL/L (ref 5–18)
BASOPHILS # BLD AUTO: 0 THOU/UL (ref 0–0.2)
BASOPHILS NFR BLD AUTO: 1 % (ref 0–2)
BUN SERPL-MCNC: 30 MG/DL (ref 8–28)
CALCIUM SERPL-MCNC: 9.9 MG/DL (ref 8.5–10.5)
CHLORIDE BLD-SCNC: 104 MMOL/L (ref 98–107)
CO2 SERPL-SCNC: 29 MMOL/L (ref 22–31)
CREAT SERPL-MCNC: 1.25 MG/DL (ref 0.7–1.3)
EOSINOPHIL # BLD AUTO: 0.3 THOU/UL (ref 0–0.4)
EOSINOPHIL NFR BLD AUTO: 5 % (ref 0–6)
ERYTHROCYTE [DISTWIDTH] IN BLOOD BY AUTOMATED COUNT: 14.2 % (ref 11–14.5)
GFR SERPL CREATININE-BSD FRML MDRD: 57 ML/MIN/1.73M2
GLUCOSE BLD-MCNC: 107 MG/DL (ref 70–125)
GLUCOSE BLDC GLUCOMTR-MCNC: 121 MG/DL
GLUCOSE BLDC GLUCOMTR-MCNC: 129 MG/DL
GLUCOSE BLDC GLUCOMTR-MCNC: 133 MG/DL
GLUCOSE BLDC GLUCOMTR-MCNC: 149 MG/DL
GLUCOSE BLDC GLUCOMTR-MCNC: 191 MG/DL
HCT VFR BLD AUTO: 27.8 % (ref 40–54)
HGB BLD-MCNC: 8.7 G/DL (ref 14–18)
INR PPP: 1.15 (ref 0.9–1.1)
LYMPHOCYTES # BLD AUTO: 0.9 THOU/UL (ref 0.8–4.4)
LYMPHOCYTES NFR BLD AUTO: 14 % (ref 20–40)
MAGNESIUM SERPL-MCNC: 2.2 MG/DL (ref 1.8–2.6)
MCH RBC QN AUTO: 27.7 PG (ref 27–34)
MCHC RBC AUTO-ENTMCNC: 31.3 G/DL (ref 32–36)
MCV RBC AUTO: 89 FL (ref 80–100)
MONOCYTES # BLD AUTO: 0.4 THOU/UL (ref 0–0.9)
MONOCYTES NFR BLD AUTO: 7 % (ref 2–10)
NEUTROPHILS # BLD AUTO: 4.3 THOU/UL (ref 2–7.7)
NEUTROPHILS NFR BLD AUTO: 73 % (ref 50–70)
PLATELET # BLD AUTO: 165 THOU/UL (ref 140–440)
PMV BLD AUTO: 10.9 FL (ref 8.5–12.5)
POTASSIUM BLD-SCNC: 4.3 MMOL/L (ref 3.5–5)
POTASSIUM BLD-SCNC: 4.3 MMOL/L (ref 3.5–5)
RBC # BLD AUTO: 3.14 MILL/UL (ref 4.4–6.2)
SODIUM SERPL-SCNC: 142 MMOL/L (ref 136–145)
WBC: 5.9 THOU/UL (ref 4–11)

## 2018-10-09 ASSESSMENT — MIFFLIN-ST. JEOR
SCORE: 1657.05

## 2018-10-10 LAB
ANION GAP SERPL CALCULATED.3IONS-SCNC: 11 MMOL/L (ref 5–18)
BASOPHILS # BLD AUTO: 0 THOU/UL (ref 0–0.2)
BASOPHILS NFR BLD AUTO: 1 % (ref 0–2)
BUN SERPL-MCNC: 26 MG/DL (ref 8–28)
CALCIUM SERPL-MCNC: 9.6 MG/DL (ref 8.5–10.5)
CHLORIDE BLD-SCNC: 103 MMOL/L (ref 98–107)
CO2 SERPL-SCNC: 25 MMOL/L (ref 22–31)
CREAT SERPL-MCNC: 1.12 MG/DL (ref 0.7–1.3)
EOSINOPHIL # BLD AUTO: 0.3 THOU/UL (ref 0–0.4)
EOSINOPHIL NFR BLD AUTO: 5 % (ref 0–6)
ERYTHROCYTE [DISTWIDTH] IN BLOOD BY AUTOMATED COUNT: 14.2 % (ref 11–14.5)
GFR SERPL CREATININE-BSD FRML MDRD: >60 ML/MIN/1.73M2
GLUCOSE BLD-MCNC: 149 MG/DL (ref 70–125)
GLUCOSE BLDC GLUCOMTR-MCNC: 148 MG/DL
GLUCOSE BLDC GLUCOMTR-MCNC: 245 MG/DL
HCT VFR BLD AUTO: 28.5 % (ref 40–54)
HGB BLD-MCNC: 8.7 G/DL (ref 14–18)
INR PPP: 1.24 (ref 0.9–1.1)
LYMPHOCYTES # BLD AUTO: 0.8 THOU/UL (ref 0.8–4.4)
LYMPHOCYTES NFR BLD AUTO: 15 % (ref 20–40)
MAGNESIUM SERPL-MCNC: 2.1 MG/DL (ref 1.8–2.6)
MCH RBC QN AUTO: 27.1 PG (ref 27–34)
MCHC RBC AUTO-ENTMCNC: 30.5 G/DL (ref 32–36)
MCV RBC AUTO: 89 FL (ref 80–100)
MONOCYTES # BLD AUTO: 0.5 THOU/UL (ref 0–0.9)
MONOCYTES NFR BLD AUTO: 9 % (ref 2–10)
NEUTROPHILS # BLD AUTO: 3.5 THOU/UL (ref 2–7.7)
NEUTROPHILS NFR BLD AUTO: 70 % (ref 50–70)
PLATELET # BLD AUTO: 164 THOU/UL (ref 140–440)
PMV BLD AUTO: 10.8 FL (ref 8.5–12.5)
POTASSIUM BLD-SCNC: 4.1 MMOL/L (ref 3.5–5)
POTASSIUM BLD-SCNC: 4.1 MMOL/L (ref 3.5–5)
RBC # BLD AUTO: 3.21 MILL/UL (ref 4.4–6.2)
SODIUM SERPL-SCNC: 139 MMOL/L (ref 136–145)
WBC: 5.1 THOU/UL (ref 4–11)

## 2018-10-10 ASSESSMENT — MIFFLIN-ST. JEOR
SCORE: 1636.64

## 2018-10-11 ENCOUNTER — OFFICE VISIT - HEALTHEAST (OUTPATIENT)
Dept: GERIATRICS | Facility: CLINIC | Age: 73
End: 2018-10-11

## 2018-10-11 ENCOUNTER — RECORDS - HEALTHEAST (OUTPATIENT)
Dept: LAB | Facility: CLINIC | Age: 73
End: 2018-10-11

## 2018-10-11 ENCOUNTER — COMMUNICATION - HEALTHEAST (OUTPATIENT)
Dept: ANTICOAGULATION | Facility: CLINIC | Age: 73
End: 2018-10-11

## 2018-10-11 DIAGNOSIS — I21.4 MI, ACUTE, NON ST SEGMENT ELEVATION (H): ICD-10-CM

## 2018-10-11 DIAGNOSIS — I48.3 TYPICAL ATRIAL FLUTTER (H): ICD-10-CM

## 2018-10-11 DIAGNOSIS — I65.21 CAROTID STENOSIS, RIGHT: ICD-10-CM

## 2018-10-11 DIAGNOSIS — Z95.1 S/P CABG X 1: ICD-10-CM

## 2018-10-11 DIAGNOSIS — I48.19 PERSISTENT ATRIAL FIBRILLATION (H): ICD-10-CM

## 2018-10-11 DIAGNOSIS — R47.02 DYSPHASIA: ICD-10-CM

## 2018-10-11 DIAGNOSIS — I48.91 ATRIAL FIBRILLATION (H): ICD-10-CM

## 2018-10-11 DIAGNOSIS — G93.41 ACUTE METABOLIC ENCEPHALOPATHY: ICD-10-CM

## 2018-10-11 LAB
ANION GAP SERPL CALCULATED.3IONS-SCNC: 9 MMOL/L (ref 5–18)
BUN SERPL-MCNC: 24 MG/DL (ref 8–28)
CALCIUM SERPL-MCNC: 9.6 MG/DL (ref 8.5–10.5)
CHLORIDE BLD-SCNC: 105 MMOL/L (ref 98–107)
CO2 SERPL-SCNC: 27 MMOL/L (ref 22–31)
CREAT SERPL-MCNC: 1.2 MG/DL (ref 0.7–1.3)
ERYTHROCYTE [DISTWIDTH] IN BLOOD BY AUTOMATED COUNT: 14.4 % (ref 11–14.5)
GFR SERPL CREATININE-BSD FRML MDRD: 59 ML/MIN/1.73M2
GLUCOSE BLD-MCNC: 108 MG/DL (ref 70–125)
HCT VFR BLD AUTO: 31.1 % (ref 40–54)
HGB BLD-MCNC: 9.6 G/DL (ref 14–18)
INR PPP: 1.6 (ref 0.9–1.1)
MCH RBC QN AUTO: 27.3 PG (ref 27–34)
MCHC RBC AUTO-ENTMCNC: 30.9 G/DL (ref 32–36)
MCV RBC AUTO: 88 FL (ref 80–100)
PLATELET # BLD AUTO: 234 THOU/UL (ref 140–440)
PMV BLD AUTO: 11 FL (ref 8.5–12.5)
POTASSIUM BLD-SCNC: 3.9 MMOL/L (ref 3.5–5)
RBC # BLD AUTO: 3.52 MILL/UL (ref 4.4–6.2)
SODIUM SERPL-SCNC: 141 MMOL/L (ref 136–145)
WBC: 6.6 THOU/UL (ref 4–11)

## 2018-10-12 ENCOUNTER — RECORDS - HEALTHEAST (OUTPATIENT)
Dept: LAB | Facility: CLINIC | Age: 73
End: 2018-10-12

## 2018-10-12 ENCOUNTER — COMMUNICATION - HEALTHEAST (OUTPATIENT)
Dept: ANTICOAGULATION | Facility: CLINIC | Age: 73
End: 2018-10-12

## 2018-10-12 DIAGNOSIS — I48.19 PERSISTENT ATRIAL FIBRILLATION (H): ICD-10-CM

## 2018-10-12 DIAGNOSIS — I48.3 TYPICAL ATRIAL FLUTTER (H): ICD-10-CM

## 2018-10-12 LAB — INR PPP: 1.86 (ref 0.9–1.1)

## 2018-10-15 ENCOUNTER — COMMUNICATION - HEALTHEAST (OUTPATIENT)
Dept: ANTICOAGULATION | Facility: CLINIC | Age: 73
End: 2018-10-15

## 2018-10-15 DIAGNOSIS — I48.3 TYPICAL ATRIAL FLUTTER (H): ICD-10-CM

## 2018-10-15 DIAGNOSIS — I48.19 PERSISTENT ATRIAL FIBRILLATION (H): ICD-10-CM

## 2018-10-15 LAB — INR PPP: 2.9 (ref 0.9–1.1)

## 2018-10-16 ENCOUNTER — OFFICE VISIT - HEALTHEAST (OUTPATIENT)
Dept: GERIATRICS | Facility: CLINIC | Age: 73
End: 2018-10-16

## 2018-10-16 DIAGNOSIS — Z95.1 S/P CABG X 1: ICD-10-CM

## 2018-10-16 DIAGNOSIS — I25.10 CORONARY ARTERY DISEASE DUE TO LIPID RICH PLAQUE: ICD-10-CM

## 2018-10-16 DIAGNOSIS — I65.21 CAROTID STENOSIS, RIGHT: ICD-10-CM

## 2018-10-16 DIAGNOSIS — I50.30 HEART FAILURE WITH PRESERVED EJECTION FRACTION (H): ICD-10-CM

## 2018-10-16 DIAGNOSIS — I48.91 ATRIAL FIBRILLATION (H): ICD-10-CM

## 2018-10-16 DIAGNOSIS — E11.52 TYPE 2 DIABETES MELLITUS WITH DIABETIC PERIPHERAL ANGIOPATHY AND GANGRENE, WITHOUT LONG-TERM CURRENT USE OF INSULIN (H): ICD-10-CM

## 2018-10-16 DIAGNOSIS — I25.83 CORONARY ARTERY DISEASE DUE TO LIPID RICH PLAQUE: ICD-10-CM

## 2018-10-16 DIAGNOSIS — I21.4 MI, ACUTE, NON ST SEGMENT ELEVATION (H): ICD-10-CM

## 2018-10-16 DIAGNOSIS — R47.02 DYSPHASIA: ICD-10-CM

## 2018-10-16 DIAGNOSIS — J18.9 COMMUNITY ACQUIRED PNEUMONIA, UNSPECIFIED LATERALITY: ICD-10-CM

## 2018-10-17 ENCOUNTER — RECORDS - HEALTHEAST (OUTPATIENT)
Dept: LAB | Facility: CLINIC | Age: 73
End: 2018-10-17

## 2018-10-18 ENCOUNTER — OFFICE VISIT - HEALTHEAST (OUTPATIENT)
Dept: GERIATRICS | Facility: CLINIC | Age: 73
End: 2018-10-18

## 2018-10-18 ENCOUNTER — COMMUNICATION - HEALTHEAST (OUTPATIENT)
Dept: ANTICOAGULATION | Facility: CLINIC | Age: 73
End: 2018-10-18

## 2018-10-18 DIAGNOSIS — I21.4 MI, ACUTE, NON ST SEGMENT ELEVATION (H): ICD-10-CM

## 2018-10-18 DIAGNOSIS — E11.52 TYPE 2 DIABETES MELLITUS WITH DIABETIC PERIPHERAL ANGIOPATHY AND GANGRENE, WITHOUT LONG-TERM CURRENT USE OF INSULIN (H): ICD-10-CM

## 2018-10-18 DIAGNOSIS — J18.9 COMMUNITY ACQUIRED PNEUMONIA, UNSPECIFIED LATERALITY: ICD-10-CM

## 2018-10-18 DIAGNOSIS — I48.91 ATRIAL FIBRILLATION (H): ICD-10-CM

## 2018-10-18 DIAGNOSIS — I50.30 HEART FAILURE WITH PRESERVED EJECTION FRACTION (H): ICD-10-CM

## 2018-10-18 DIAGNOSIS — I48.3 TYPICAL ATRIAL FLUTTER (H): ICD-10-CM

## 2018-10-18 DIAGNOSIS — R47.02 DYSPHASIA: ICD-10-CM

## 2018-10-18 DIAGNOSIS — Z95.1 S/P CABG X 1: ICD-10-CM

## 2018-10-18 DIAGNOSIS — I48.19 PERSISTENT ATRIAL FIBRILLATION (H): ICD-10-CM

## 2018-10-18 LAB
ANION GAP SERPL CALCULATED.3IONS-SCNC: 11 MMOL/L (ref 5–18)
BASOPHILS # BLD AUTO: 0.1 THOU/UL (ref 0–0.2)
BASOPHILS NFR BLD AUTO: 1 % (ref 0–2)
BNP SERPL-MCNC: 357 PG/ML (ref 0–72)
BUN SERPL-MCNC: 33 MG/DL (ref 8–28)
CALCIUM SERPL-MCNC: 10 MG/DL (ref 8.5–10.5)
CHLORIDE BLD-SCNC: 105 MMOL/L (ref 98–107)
CO2 SERPL-SCNC: 23 MMOL/L (ref 22–31)
CREAT SERPL-MCNC: 1.38 MG/DL (ref 0.7–1.3)
EOSINOPHIL # BLD AUTO: 0.5 THOU/UL (ref 0–0.4)
EOSINOPHIL NFR BLD AUTO: 6 % (ref 0–6)
ERYTHROCYTE [DISTWIDTH] IN BLOOD BY AUTOMATED COUNT: 14.5 % (ref 11–14.5)
GFR SERPL CREATININE-BSD FRML MDRD: 51 ML/MIN/1.73M2
GLUCOSE BLD-MCNC: 105 MG/DL (ref 70–125)
HCT VFR BLD AUTO: 33.5 % (ref 40–54)
HGB BLD-MCNC: 10.4 G/DL (ref 14–18)
INR PPP: 3.9 (ref 0.9–1.1)
LYMPHOCYTES # BLD AUTO: 1.2 THOU/UL (ref 0.8–4.4)
LYMPHOCYTES NFR BLD AUTO: 14 % (ref 20–40)
MCH RBC QN AUTO: 26.9 PG (ref 27–34)
MCHC RBC AUTO-ENTMCNC: 31 G/DL (ref 32–36)
MCV RBC AUTO: 87 FL (ref 80–100)
MONOCYTES # BLD AUTO: 0.9 THOU/UL (ref 0–0.9)
MONOCYTES NFR BLD AUTO: 10 % (ref 2–10)
NEUTROPHILS # BLD AUTO: 6.1 THOU/UL (ref 2–7.7)
NEUTROPHILS NFR BLD AUTO: 70 % (ref 50–70)
PLATELET # BLD AUTO: 329 THOU/UL (ref 140–440)
PMV BLD AUTO: 11.2 FL (ref 8.5–12.5)
POTASSIUM BLD-SCNC: 4.8 MMOL/L (ref 3.5–5)
RBC # BLD AUTO: 3.86 MILL/UL (ref 4.4–6.2)
SODIUM SERPL-SCNC: 139 MMOL/L (ref 136–145)
WBC: 8.9 THOU/UL (ref 4–11)

## 2018-10-19 ENCOUNTER — RECORDS - HEALTHEAST (OUTPATIENT)
Dept: LAB | Facility: CLINIC | Age: 73
End: 2018-10-19

## 2018-10-22 ENCOUNTER — OFFICE VISIT - HEALTHEAST (OUTPATIENT)
Dept: VASCULAR SURGERY | Facility: CLINIC | Age: 73
End: 2018-10-22

## 2018-10-22 ENCOUNTER — COMMUNICATION - HEALTHEAST (OUTPATIENT)
Dept: ANTICOAGULATION | Facility: CLINIC | Age: 73
End: 2018-10-22

## 2018-10-22 DIAGNOSIS — I87.303 VENOUS HYPERTENSION, CHRONIC, BILATERAL: ICD-10-CM

## 2018-10-22 DIAGNOSIS — I50.30 HEART FAILURE WITH PRESERVED EJECTION FRACTION, NYHA CLASS II (H): ICD-10-CM

## 2018-10-22 DIAGNOSIS — I89.0 ACQUIRED LYMPHEDEMA OF LOWER EXTREMITY: ICD-10-CM

## 2018-10-22 DIAGNOSIS — L97.921 ULCER OF LEFT LOWER EXTREMITY, LIMITED TO BREAKDOWN OF SKIN (H): ICD-10-CM

## 2018-10-22 DIAGNOSIS — I48.19 PERSISTENT ATRIAL FIBRILLATION (H): ICD-10-CM

## 2018-10-22 DIAGNOSIS — L97.929 ULCER OF LEFT LOWER EXTREMITY, UNSPECIFIED ULCER STAGE (H): ICD-10-CM

## 2018-10-22 DIAGNOSIS — I48.3 TYPICAL ATRIAL FLUTTER (H): ICD-10-CM

## 2018-10-22 DIAGNOSIS — I73.9 PAD (PERIPHERAL ARTERY DISEASE) (H): ICD-10-CM

## 2018-10-22 LAB
ANION GAP SERPL CALCULATED.3IONS-SCNC: 10 MMOL/L (ref 5–18)
BNP SERPL-MCNC: 307 PG/ML (ref 0–72)
BUN SERPL-MCNC: 33 MG/DL (ref 8–28)
CALCIUM SERPL-MCNC: 9.8 MG/DL (ref 8.5–10.5)
CHLORIDE BLD-SCNC: 105 MMOL/L (ref 98–107)
CO2 SERPL-SCNC: 24 MMOL/L (ref 22–31)
CREAT SERPL-MCNC: 1.34 MG/DL (ref 0.7–1.3)
GFR SERPL CREATININE-BSD FRML MDRD: 52 ML/MIN/1.73M2
GLUCOSE BLD-MCNC: 93 MG/DL (ref 70–125)
INR PPP: 2.4 (ref 0.9–1.1)
POTASSIUM BLD-SCNC: 4.6 MMOL/L (ref 3.5–5)
SODIUM SERPL-SCNC: 139 MMOL/L (ref 136–145)

## 2018-10-22 ASSESSMENT — MIFFLIN-ST. JEOR: SCORE: 1605.34

## 2018-10-23 ENCOUNTER — OFFICE VISIT - HEALTHEAST (OUTPATIENT)
Dept: GERIATRICS | Facility: CLINIC | Age: 73
End: 2018-10-23

## 2018-10-23 DIAGNOSIS — Z89.432 S/P TRANSMETATARSAL AMPUTATION OF FOOT, LEFT (H): ICD-10-CM

## 2018-10-23 DIAGNOSIS — I50.30 HEART FAILURE WITH PRESERVED EJECTION FRACTION (H): ICD-10-CM

## 2018-10-23 DIAGNOSIS — I48.91 ATRIAL FIBRILLATION (H): ICD-10-CM

## 2018-10-23 DIAGNOSIS — I48.92 ATRIAL FLUTTER, UNSPECIFIED TYPE (H): ICD-10-CM

## 2018-10-23 DIAGNOSIS — I73.9 PAD (PERIPHERAL ARTERY DISEASE) (H): ICD-10-CM

## 2018-10-23 DIAGNOSIS — R47.02 DYSPHASIA: ICD-10-CM

## 2018-10-23 DIAGNOSIS — Z95.1 S/P CABG X 1: ICD-10-CM

## 2018-10-23 DIAGNOSIS — I21.4 MI, ACUTE, NON ST SEGMENT ELEVATION (H): ICD-10-CM

## 2018-10-23 DIAGNOSIS — J18.9 COMMUNITY ACQUIRED PNEUMONIA, UNSPECIFIED LATERALITY: ICD-10-CM

## 2018-10-23 DIAGNOSIS — E11.52 TYPE 2 DIABETES MELLITUS WITH DIABETIC PERIPHERAL ANGIOPATHY AND GANGRENE, WITHOUT LONG-TERM CURRENT USE OF INSULIN (H): ICD-10-CM

## 2018-10-24 ENCOUNTER — RECORDS - HEALTHEAST (OUTPATIENT)
Dept: LAB | Facility: CLINIC | Age: 73
End: 2018-10-24

## 2018-10-25 ENCOUNTER — OFFICE VISIT - HEALTHEAST (OUTPATIENT)
Dept: GERIATRICS | Facility: CLINIC | Age: 73
End: 2018-10-25

## 2018-10-25 ENCOUNTER — COMMUNICATION - HEALTHEAST (OUTPATIENT)
Dept: ANTICOAGULATION | Facility: CLINIC | Age: 73
End: 2018-10-25

## 2018-10-25 DIAGNOSIS — I21.4 MI, ACUTE, NON ST SEGMENT ELEVATION (H): ICD-10-CM

## 2018-10-25 DIAGNOSIS — I50.30 HEART FAILURE WITH PRESERVED EJECTION FRACTION (H): ICD-10-CM

## 2018-10-25 DIAGNOSIS — I48.3 TYPICAL ATRIAL FLUTTER (H): ICD-10-CM

## 2018-10-25 DIAGNOSIS — Z95.1 S/P CABG X 1: ICD-10-CM

## 2018-10-25 DIAGNOSIS — I48.19 PERSISTENT ATRIAL FIBRILLATION (H): ICD-10-CM

## 2018-10-25 DIAGNOSIS — I73.9 PAD (PERIPHERAL ARTERY DISEASE) (H): ICD-10-CM

## 2018-10-25 DIAGNOSIS — J18.9 COMMUNITY ACQUIRED PNEUMONIA, UNSPECIFIED LATERALITY: ICD-10-CM

## 2018-10-25 DIAGNOSIS — I48.91 ATRIAL FIBRILLATION (H): ICD-10-CM

## 2018-10-25 LAB
ANION GAP SERPL CALCULATED.3IONS-SCNC: 9 MMOL/L (ref 5–18)
BUN SERPL-MCNC: 31 MG/DL (ref 8–28)
CALCIUM SERPL-MCNC: 10.1 MG/DL (ref 8.5–10.5)
CHLORIDE BLD-SCNC: 102 MMOL/L (ref 98–107)
CO2 SERPL-SCNC: 26 MMOL/L (ref 22–31)
CREAT SERPL-MCNC: 1.35 MG/DL (ref 0.7–1.3)
GFR SERPL CREATININE-BSD FRML MDRD: 52 ML/MIN/1.73M2
GLUCOSE BLD-MCNC: 103 MG/DL (ref 70–125)
INR PPP: 2.4 (ref 0.9–1.1)
POTASSIUM BLD-SCNC: 4.9 MMOL/L (ref 3.5–5)
SODIUM SERPL-SCNC: 137 MMOL/L (ref 136–145)

## 2018-10-29 ENCOUNTER — COMMUNICATION - HEALTHEAST (OUTPATIENT)
Dept: ANTICOAGULATION | Facility: CLINIC | Age: 73
End: 2018-10-29

## 2018-10-29 DIAGNOSIS — I48.3 TYPICAL ATRIAL FLUTTER (H): ICD-10-CM

## 2018-10-29 DIAGNOSIS — I48.19 PERSISTENT ATRIAL FIBRILLATION (H): ICD-10-CM

## 2018-10-29 LAB — INR PPP: 2.1 (ref 0.9–1.1)

## 2018-10-30 ENCOUNTER — OFFICE VISIT - HEALTHEAST (OUTPATIENT)
Dept: CARDIOLOGY | Facility: CLINIC | Age: 73
End: 2018-10-30

## 2018-10-30 ENCOUNTER — RECORDS - HEALTHEAST (OUTPATIENT)
Dept: ADMINISTRATIVE | Facility: OTHER | Age: 73
End: 2018-10-30

## 2018-10-30 ENCOUNTER — OFFICE VISIT - HEALTHEAST (OUTPATIENT)
Dept: GERIATRICS | Facility: CLINIC | Age: 73
End: 2018-10-30

## 2018-10-30 DIAGNOSIS — I48.91 ATRIAL FIBRILLATION (H): ICD-10-CM

## 2018-10-30 DIAGNOSIS — Z95.1 S/P CABG X 1: ICD-10-CM

## 2018-10-30 DIAGNOSIS — I50.30 HEART FAILURE WITH PRESERVED EJECTION FRACTION (H): ICD-10-CM

## 2018-10-30 DIAGNOSIS — Z95.1 S/P CABG (CORONARY ARTERY BYPASS GRAFT): ICD-10-CM

## 2018-10-30 DIAGNOSIS — I21.4 MI, ACUTE, NON ST SEGMENT ELEVATION (H): ICD-10-CM

## 2018-10-30 DIAGNOSIS — J18.9 COMMUNITY ACQUIRED PNEUMONIA, UNSPECIFIED LATERALITY: ICD-10-CM

## 2018-10-30 DIAGNOSIS — R47.02 DYSPHASIA: ICD-10-CM

## 2018-10-30 ASSESSMENT — MIFFLIN-ST. JEOR: SCORE: 1625.3

## 2018-10-31 ENCOUNTER — RECORDS - HEALTHEAST (OUTPATIENT)
Dept: ADMINISTRATIVE | Facility: OTHER | Age: 73
End: 2018-10-31

## 2018-11-01 ENCOUNTER — COMMUNICATION - HEALTHEAST (OUTPATIENT)
Dept: ANTICOAGULATION | Facility: CLINIC | Age: 73
End: 2018-11-01

## 2018-11-01 ENCOUNTER — OFFICE VISIT - HEALTHEAST (OUTPATIENT)
Dept: GERIATRICS | Facility: CLINIC | Age: 73
End: 2018-11-01

## 2018-11-01 DIAGNOSIS — R47.02 DYSPHASIA: ICD-10-CM

## 2018-11-01 DIAGNOSIS — I48.3 TYPICAL ATRIAL FLUTTER (H): ICD-10-CM

## 2018-11-01 DIAGNOSIS — I21.4 MI, ACUTE, NON ST SEGMENT ELEVATION (H): ICD-10-CM

## 2018-11-01 DIAGNOSIS — J18.9 COMMUNITY ACQUIRED PNEUMONIA, UNSPECIFIED LATERALITY: ICD-10-CM

## 2018-11-01 DIAGNOSIS — I73.9 PAD (PERIPHERAL ARTERY DISEASE) (H): ICD-10-CM

## 2018-11-01 DIAGNOSIS — E11.52 TYPE 2 DIABETES MELLITUS WITH DIABETIC PERIPHERAL ANGIOPATHY AND GANGRENE, WITHOUT LONG-TERM CURRENT USE OF INSULIN (H): ICD-10-CM

## 2018-11-01 DIAGNOSIS — Z95.1 S/P CABG X 1: ICD-10-CM

## 2018-11-01 DIAGNOSIS — I48.91 ATRIAL FIBRILLATION (H): ICD-10-CM

## 2018-11-01 DIAGNOSIS — I50.30 HEART FAILURE WITH PRESERVED EJECTION FRACTION (H): ICD-10-CM

## 2018-11-01 DIAGNOSIS — I48.19 PERSISTENT ATRIAL FIBRILLATION (H): ICD-10-CM

## 2018-11-01 LAB — INR PPP: 2.6 (ref 0.9–1.1)

## 2018-11-02 ENCOUNTER — HOSPITAL ENCOUNTER (OUTPATIENT)
Dept: RADIOLOGY | Facility: HOSPITAL | Age: 73
Discharge: HOME | End: 2018-11-02

## 2018-11-02 ENCOUNTER — RECORDS - HEALTHEAST (OUTPATIENT)
Dept: LAB | Facility: CLINIC | Age: 73
End: 2018-11-02

## 2018-11-02 DIAGNOSIS — R13.10 DYSPHAGIA: ICD-10-CM

## 2018-11-05 ENCOUNTER — OFFICE VISIT - HEALTHEAST (OUTPATIENT)
Dept: CARDIOLOGY | Facility: CLINIC | Age: 73
End: 2018-11-05

## 2018-11-05 ENCOUNTER — COMMUNICATION - HEALTHEAST (OUTPATIENT)
Dept: ANTICOAGULATION | Facility: CLINIC | Age: 73
End: 2018-11-05

## 2018-11-05 DIAGNOSIS — I48.19 PERSISTENT ATRIAL FIBRILLATION (H): ICD-10-CM

## 2018-11-05 DIAGNOSIS — I50.30 HEART FAILURE WITH PRESERVED EJECTION FRACTION, NYHA CLASS II (H): ICD-10-CM

## 2018-11-05 DIAGNOSIS — I10 BENIGN ESSENTIAL HYPERTENSION: ICD-10-CM

## 2018-11-05 DIAGNOSIS — I48.3 TYPICAL ATRIAL FLUTTER (H): ICD-10-CM

## 2018-11-05 DIAGNOSIS — Z95.1 S/P CABG X 3: ICD-10-CM

## 2018-11-05 LAB
ANION GAP SERPL CALCULATED.3IONS-SCNC: 10 MMOL/L (ref 5–18)
BNP SERPL-MCNC: 148 PG/ML (ref 0–72)
BUN SERPL-MCNC: 22 MG/DL (ref 8–28)
CALCIUM SERPL-MCNC: 9.4 MG/DL (ref 8.5–10.5)
CHLORIDE BLD-SCNC: 105 MMOL/L (ref 98–107)
CO2 SERPL-SCNC: 23 MMOL/L (ref 22–31)
CREAT SERPL-MCNC: 1.22 MG/DL (ref 0.7–1.3)
GFR SERPL CREATININE-BSD FRML MDRD: 58 ML/MIN/1.73M2
GLUCOSE BLD-MCNC: 123 MG/DL (ref 70–125)
INR PPP: 2.4 (ref 0.9–1.1)
POTASSIUM BLD-SCNC: 4.5 MMOL/L (ref 3.5–5)
SODIUM SERPL-SCNC: 138 MMOL/L (ref 136–145)

## 2018-11-05 ASSESSMENT — MIFFLIN-ST. JEOR: SCORE: 1608.29

## 2018-11-06 ENCOUNTER — OFFICE VISIT - HEALTHEAST (OUTPATIENT)
Dept: GERIATRICS | Facility: CLINIC | Age: 73
End: 2018-11-06

## 2018-11-06 DIAGNOSIS — I73.9 PAD (PERIPHERAL ARTERY DISEASE) (H): ICD-10-CM

## 2018-11-06 DIAGNOSIS — R47.02 DYSPHASIA: ICD-10-CM

## 2018-11-06 DIAGNOSIS — I50.30 HEART FAILURE WITH PRESERVED EJECTION FRACTION (H): ICD-10-CM

## 2018-11-06 DIAGNOSIS — I48.91 ATRIAL FIBRILLATION (H): ICD-10-CM

## 2018-11-06 DIAGNOSIS — I21.4 MI, ACUTE, NON ST SEGMENT ELEVATION (H): ICD-10-CM

## 2018-11-06 DIAGNOSIS — Z95.1 S/P CABG X 1: ICD-10-CM

## 2018-11-07 ENCOUNTER — OFFICE VISIT - HEALTHEAST (OUTPATIENT)
Dept: GERIATRICS | Facility: CLINIC | Age: 73
End: 2018-11-07

## 2018-11-07 ENCOUNTER — RECORDS - HEALTHEAST (OUTPATIENT)
Dept: LAB | Facility: CLINIC | Age: 73
End: 2018-11-07

## 2018-11-07 DIAGNOSIS — I50.30 HEART FAILURE WITH PRESERVED EJECTION FRACTION (H): ICD-10-CM

## 2018-11-07 DIAGNOSIS — I73.9 PAD (PERIPHERAL ARTERY DISEASE) (H): ICD-10-CM

## 2018-11-07 DIAGNOSIS — R47.02 DYSPHASIA: ICD-10-CM

## 2018-11-07 DIAGNOSIS — Z95.1 S/P CABG X 1: ICD-10-CM

## 2018-11-07 DIAGNOSIS — I48.0 PAROXYSMAL ATRIAL FIBRILLATION (H): ICD-10-CM

## 2018-11-07 DIAGNOSIS — E11.52 TYPE 2 DIABETES MELLITUS WITH DIABETIC PERIPHERAL ANGIOPATHY AND GANGRENE, WITHOUT LONG-TERM CURRENT USE OF INSULIN (H): ICD-10-CM

## 2018-11-07 DIAGNOSIS — I65.21 CAROTID STENOSIS, RIGHT: ICD-10-CM

## 2018-11-07 DIAGNOSIS — I21.4 MI, ACUTE, NON ST SEGMENT ELEVATION (H): ICD-10-CM

## 2018-11-07 LAB
ANION GAP SERPL CALCULATED.3IONS-SCNC: 11 MMOL/L (ref 5–18)
BNP SERPL-MCNC: 161 PG/ML (ref 0–72)
BUN SERPL-MCNC: 18 MG/DL (ref 8–28)
CALCIUM SERPL-MCNC: 10 MG/DL (ref 8.5–10.5)
CHLORIDE BLD-SCNC: 102 MMOL/L (ref 98–107)
CO2 SERPL-SCNC: 24 MMOL/L (ref 22–31)
CREAT SERPL-MCNC: 1.31 MG/DL (ref 0.7–1.3)
GFR SERPL CREATININE-BSD FRML MDRD: 54 ML/MIN/1.73M2
GLUCOSE BLD-MCNC: 269 MG/DL (ref 70–125)
POTASSIUM BLD-SCNC: 4 MMOL/L (ref 3.5–5)
SODIUM SERPL-SCNC: 137 MMOL/L (ref 136–145)

## 2018-11-09 ENCOUNTER — AMBULATORY - HEALTHEAST (OUTPATIENT)
Dept: GERIATRICS | Facility: CLINIC | Age: 73
End: 2018-11-09

## 2018-11-12 ENCOUNTER — OFFICE VISIT - HEALTHEAST (OUTPATIENT)
Dept: VASCULAR SURGERY | Facility: CLINIC | Age: 73
End: 2018-11-12

## 2018-11-12 ENCOUNTER — AMBULATORY - HEALTHEAST (OUTPATIENT)
Dept: VASCULAR SURGERY | Facility: CLINIC | Age: 73
End: 2018-11-12

## 2018-11-12 ENCOUNTER — COMMUNICATION - HEALTHEAST (OUTPATIENT)
Dept: ANTICOAGULATION | Facility: CLINIC | Age: 73
End: 2018-11-12

## 2018-11-12 DIAGNOSIS — Z91.199 MEDICALLY NONCOMPLIANT: ICD-10-CM

## 2018-11-12 DIAGNOSIS — I48.3 TYPICAL ATRIAL FLUTTER (H): ICD-10-CM

## 2018-11-12 DIAGNOSIS — I89.0 ACQUIRED LYMPHEDEMA OF LOWER EXTREMITY: ICD-10-CM

## 2018-11-12 DIAGNOSIS — I48.19 PERSISTENT ATRIAL FIBRILLATION (H): ICD-10-CM

## 2018-11-12 DIAGNOSIS — E11.9 NON-INSULIN DEPENDENT TYPE 2 DIABETES MELLITUS (H): ICD-10-CM

## 2018-11-12 DIAGNOSIS — I87.2 VENOUS STASIS DERMATITIS OF BOTH LOWER EXTREMITIES: ICD-10-CM

## 2018-11-12 DIAGNOSIS — I73.9 PAD (PERIPHERAL ARTERY DISEASE) (H): ICD-10-CM

## 2018-11-13 ENCOUNTER — RECORDS - HEALTHEAST (OUTPATIENT)
Dept: LAB | Facility: CLINIC | Age: 73
End: 2018-11-13

## 2018-11-13 LAB — INR PPP: 2.19 (ref 0.9–1.1)

## 2018-11-15 ENCOUNTER — AMBULATORY - HEALTHEAST (OUTPATIENT)
Dept: CARDIAC REHAB | Facility: HOSPITAL | Age: 73
End: 2018-11-15

## 2018-11-15 DIAGNOSIS — Z95.1 S/P CABG (CORONARY ARTERY BYPASS GRAFT): ICD-10-CM

## 2018-11-17 ENCOUNTER — RECORDS - HEALTHEAST (OUTPATIENT)
Dept: LAB | Facility: CLINIC | Age: 73
End: 2018-11-17

## 2018-11-20 ENCOUNTER — AMBULATORY - HEALTHEAST (OUTPATIENT)
Dept: CARDIAC REHAB | Facility: HOSPITAL | Age: 73
End: 2018-11-20

## 2018-11-20 DIAGNOSIS — Z95.1 S/P CABG (CORONARY ARTERY BYPASS GRAFT): ICD-10-CM

## 2018-11-20 LAB
GLUCOSE BLDC GLUCOMTR-MCNC: 135 MG/DL
GLUCOSE BLDC GLUCOMTR-MCNC: 173 MG/DL
HBA1C MFR BLD: 6.8 % (ref 4.2–6.1)

## 2018-11-27 ENCOUNTER — AMBULATORY - HEALTHEAST (OUTPATIENT)
Dept: CARDIAC REHAB | Facility: HOSPITAL | Age: 73
End: 2018-11-27

## 2018-11-27 DIAGNOSIS — Z95.1 S/P CABG (CORONARY ARTERY BYPASS GRAFT): ICD-10-CM

## 2018-11-27 LAB
GLUCOSE BLDC GLUCOMTR-MCNC: 138 MG/DL
GLUCOSE BLDC GLUCOMTR-MCNC: 166 MG/DL

## 2018-11-29 ENCOUNTER — OFFICE VISIT - HEALTHEAST (OUTPATIENT)
Dept: FAMILY MEDICINE | Facility: CLINIC | Age: 73
End: 2018-11-29

## 2018-11-29 ENCOUNTER — COMMUNICATION - HEALTHEAST (OUTPATIENT)
Dept: SCHEDULING | Facility: CLINIC | Age: 73
End: 2018-11-29

## 2018-11-29 DIAGNOSIS — S81.802D WOUND OF LEFT LOWER EXTREMITY, SUBSEQUENT ENCOUNTER: ICD-10-CM

## 2018-12-02 ENCOUNTER — COMMUNICATION - HEALTHEAST (OUTPATIENT)
Dept: FAMILY MEDICINE | Facility: CLINIC | Age: 73
End: 2018-12-02

## 2018-12-03 ENCOUNTER — OFFICE VISIT - HEALTHEAST (OUTPATIENT)
Dept: VASCULAR SURGERY | Facility: CLINIC | Age: 73
End: 2018-12-03

## 2018-12-03 DIAGNOSIS — S91.302S WOUND, OPEN, FOOT, LEFT, SEQUELA: ICD-10-CM

## 2018-12-03 DIAGNOSIS — I87.2 VENOUS STASIS DERMATITIS OF BOTH LOWER EXTREMITIES: ICD-10-CM

## 2018-12-03 DIAGNOSIS — L23.1 ALLERGIC CONTACT DERMATITIS DUE TO ADHESIVES: ICD-10-CM

## 2018-12-03 DIAGNOSIS — I87.303 VENOUS HYPERTENSION, CHRONIC, BILATERAL: ICD-10-CM

## 2018-12-03 DIAGNOSIS — I25.5 ISCHEMIC CARDIOMYOPATHY: ICD-10-CM

## 2018-12-03 DIAGNOSIS — I73.9 PAD (PERIPHERAL ARTERY DISEASE) (H): ICD-10-CM

## 2018-12-03 DIAGNOSIS — L97.921 LEG ULCER, LEFT, LIMITED TO BREAKDOWN OF SKIN (H): ICD-10-CM

## 2018-12-03 LAB
BACTERIA SPEC CULT: ABNORMAL

## 2018-12-04 ENCOUNTER — AMBULATORY - HEALTHEAST (OUTPATIENT)
Dept: CARDIOLOGY | Facility: CLINIC | Age: 73
End: 2018-12-04

## 2018-12-04 ENCOUNTER — COMMUNICATION - HEALTHEAST (OUTPATIENT)
Dept: CARDIOLOGY | Facility: CLINIC | Age: 73
End: 2018-12-04

## 2018-12-04 ENCOUNTER — COMMUNICATION - HEALTHEAST (OUTPATIENT)
Dept: VASCULAR SURGERY | Facility: CLINIC | Age: 73
End: 2018-12-04

## 2018-12-04 DIAGNOSIS — I48.0 PAROXYSMAL ATRIAL FIBRILLATION (H): ICD-10-CM

## 2018-12-06 ENCOUNTER — RECORDS - HEALTHEAST (OUTPATIENT)
Dept: LAB | Facility: CLINIC | Age: 73
End: 2018-12-06

## 2018-12-07 LAB — INR PPP: 4.05 (ref 0.9–1.1)

## 2018-12-09 ENCOUNTER — AMBULATORY - HEALTHEAST (OUTPATIENT)
Dept: VASCULAR SURGERY | Facility: CLINIC | Age: 73
End: 2018-12-09

## 2018-12-10 ENCOUNTER — COMMUNICATION - HEALTHEAST (OUTPATIENT)
Dept: VASCULAR SURGERY | Facility: CLINIC | Age: 73
End: 2018-12-10

## 2018-12-10 ENCOUNTER — AMBULATORY - HEALTHEAST (OUTPATIENT)
Dept: VASCULAR SURGERY | Facility: CLINIC | Age: 73
End: 2018-12-10

## 2018-12-11 ENCOUNTER — COMMUNICATION - HEALTHEAST (OUTPATIENT)
Dept: VASCULAR SURGERY | Facility: CLINIC | Age: 73
End: 2018-12-11

## 2018-12-11 ENCOUNTER — AMBULATORY - HEALTHEAST (OUTPATIENT)
Dept: CARDIAC REHAB | Facility: HOSPITAL | Age: 73
End: 2018-12-11

## 2018-12-11 DIAGNOSIS — Z95.1 S/P CABG (CORONARY ARTERY BYPASS GRAFT): ICD-10-CM

## 2018-12-11 LAB
GLUCOSE BLDC GLUCOMTR-MCNC: 233 MG/DL
GLUCOSE BLDC GLUCOMTR-MCNC: 245 MG/DL

## 2018-12-13 ENCOUNTER — HOSPITAL ENCOUNTER (OUTPATIENT)
Dept: CARDIOLOGY | Facility: HOSPITAL | Age: 73
Discharge: HOME | End: 2018-12-13
Attending: NURSE PRACTITIONER

## 2018-12-13 ENCOUNTER — AMBULATORY - HEALTHEAST (OUTPATIENT)
Dept: CARDIAC REHAB | Facility: HOSPITAL | Age: 73
End: 2018-12-13

## 2018-12-13 DIAGNOSIS — I48.0 PAROXYSMAL ATRIAL FIBRILLATION (H): ICD-10-CM

## 2018-12-13 DIAGNOSIS — Z95.1 S/P CABG (CORONARY ARTERY BYPASS GRAFT): ICD-10-CM

## 2018-12-13 LAB
GLUCOSE BLDC GLUCOMTR-MCNC: 100 MG/DL
GLUCOSE BLDC GLUCOMTR-MCNC: 141 MG/DL
GLUCOSE BLDC GLUCOMTR-MCNC: 82 MG/DL

## 2018-12-14 ENCOUNTER — RECORDS - HEALTHEAST (OUTPATIENT)
Dept: LAB | Facility: CLINIC | Age: 73
End: 2018-12-14

## 2018-12-14 LAB — INR PPP: 2.2 (ref 0.9–1.1)

## 2018-12-18 ENCOUNTER — AMBULATORY - HEALTHEAST (OUTPATIENT)
Dept: CARDIAC REHAB | Facility: HOSPITAL | Age: 73
End: 2018-12-18

## 2018-12-18 DIAGNOSIS — Z95.1 S/P CABG (CORONARY ARTERY BYPASS GRAFT): ICD-10-CM

## 2018-12-18 LAB
GLUCOSE BLDC GLUCOMTR-MCNC: 113 MG/DL
GLUCOSE BLDC GLUCOMTR-MCNC: 85 MG/DL
GLUCOSE BLDC GLUCOMTR-MCNC: 88 MG/DL

## 2018-12-19 ENCOUNTER — RECORDS - HEALTHEAST (OUTPATIENT)
Dept: ADMINISTRATIVE | Facility: OTHER | Age: 73
End: 2018-12-19

## 2018-12-20 ENCOUNTER — RECORDS - HEALTHEAST (OUTPATIENT)
Dept: VASCULAR ULTRASOUND | Facility: CLINIC | Age: 73
End: 2018-12-20

## 2018-12-20 ENCOUNTER — AMBULATORY - HEALTHEAST (OUTPATIENT)
Dept: CARDIAC REHAB | Facility: HOSPITAL | Age: 73
End: 2018-12-20

## 2018-12-20 ENCOUNTER — OFFICE VISIT - HEALTHEAST (OUTPATIENT)
Dept: VASCULAR SURGERY | Facility: CLINIC | Age: 73
End: 2018-12-20

## 2018-12-20 DIAGNOSIS — Z95.1 S/P CABG (CORONARY ARTERY BYPASS GRAFT): ICD-10-CM

## 2018-12-20 DIAGNOSIS — I77.1 STRICTURE OF ARTERY (H): ICD-10-CM

## 2018-12-20 DIAGNOSIS — I73.9 PAD (PERIPHERAL ARTERY DISEASE) (H): ICD-10-CM

## 2018-12-20 DIAGNOSIS — I73.9 PERIPHERAL VASCULAR DISEASE, UNSPECIFIED (H): ICD-10-CM

## 2018-12-20 DIAGNOSIS — L97.921 LEG ULCER, LEFT, LIMITED TO BREAKDOWN OF SKIN (H): ICD-10-CM

## 2018-12-20 DIAGNOSIS — I77.1 ARTERIAL INSUFFICIENCY (H): ICD-10-CM

## 2018-12-20 LAB
GLUCOSE BLDC GLUCOMTR-MCNC: 112 MG/DL
GLUCOSE BLDC GLUCOMTR-MCNC: 154 MG/DL

## 2018-12-24 ENCOUNTER — RECORDS - HEALTHEAST (OUTPATIENT)
Dept: LAB | Facility: CLINIC | Age: 73
End: 2018-12-24

## 2018-12-24 LAB — INR PPP: 2.2 (ref 0.9–1.1)

## 2018-12-27 ENCOUNTER — AMBULATORY - HEALTHEAST (OUTPATIENT)
Dept: CARDIAC REHAB | Facility: HOSPITAL | Age: 73
End: 2018-12-27

## 2018-12-27 DIAGNOSIS — Z95.1 S/P CABG (CORONARY ARTERY BYPASS GRAFT): ICD-10-CM

## 2018-12-27 LAB
GLUCOSE BLDC GLUCOMTR-MCNC: 194 MG/DL
GLUCOSE BLDC GLUCOMTR-MCNC: 214 MG/DL

## 2018-12-28 ENCOUNTER — HOSPITAL ENCOUNTER (OUTPATIENT)
Dept: CARDIOLOGY | Facility: HOSPITAL | Age: 73
Discharge: HOME | End: 2018-12-20
Attending: NURSE PRACTITIONER

## 2019-01-02 ENCOUNTER — COMMUNICATION - HEALTHEAST (OUTPATIENT)
Dept: CARDIOLOGY | Facility: CLINIC | Age: 74
End: 2019-01-02

## 2019-01-06 ENCOUNTER — HOME CARE/HOSPICE - HEALTHEAST (OUTPATIENT)
Dept: HOME HEALTH SERVICES | Facility: HOME HEALTH | Age: 74
End: 2019-01-06

## 2019-01-07 ENCOUNTER — RECORDS - HEALTHEAST (OUTPATIENT)
Dept: LAB | Facility: CLINIC | Age: 74
End: 2019-01-07

## 2019-01-08 ENCOUNTER — TELEPHONE (OUTPATIENT)
Dept: CARE COORDINATION | Facility: CLINIC | Age: 74
End: 2019-01-08

## 2019-01-08 ENCOUNTER — AMBULATORY - HEALTHEAST (OUTPATIENT)
Dept: CARDIAC REHAB | Facility: HOSPITAL | Age: 74
End: 2019-01-08

## 2019-01-08 DIAGNOSIS — Z95.1 S/P CABG (CORONARY ARTERY BYPASS GRAFT): ICD-10-CM

## 2019-01-08 LAB
GLUCOSE BLDC GLUCOMTR-MCNC: 114 MG/DL
GLUCOSE BLDC GLUCOMTR-MCNC: 86 MG/DL
GLUCOSE BLDC GLUCOMTR-MCNC: 99 MG/DL
INR PPP: 2.37 (ref 0.9–1.1)

## 2019-01-08 NOTE — TELEPHONE ENCOUNTER
Attempted to schedule patients home care visit yesterday and he declined due to appt, tried again today and patient declined skilled nurse visit today as well.     Maryann Deluna RN  [facility]   [phone]

## 2019-01-10 ENCOUNTER — OFFICE VISIT - HEALTHEAST (OUTPATIENT)
Dept: VASCULAR SURGERY | Facility: CLINIC | Age: 74
End: 2019-01-10

## 2019-01-10 ENCOUNTER — AMBULATORY - HEALTHEAST (OUTPATIENT)
Dept: CARDIAC REHAB | Facility: HOSPITAL | Age: 74
End: 2019-01-10

## 2019-01-10 DIAGNOSIS — I25.5 ISCHEMIC CARDIOMYOPATHY: ICD-10-CM

## 2019-01-10 DIAGNOSIS — Z95.1 S/P CABG (CORONARY ARTERY BYPASS GRAFT): ICD-10-CM

## 2019-01-10 DIAGNOSIS — I87.2 VENOUS STASIS DERMATITIS OF BOTH LOWER EXTREMITIES: ICD-10-CM

## 2019-01-10 DIAGNOSIS — I87.303 VENOUS HYPERTENSION, CHRONIC, BILATERAL: ICD-10-CM

## 2019-01-10 DIAGNOSIS — L97.921 LEG ULCER, LEFT, LIMITED TO BREAKDOWN OF SKIN (H): ICD-10-CM

## 2019-01-10 DIAGNOSIS — E11.9 NON-INSULIN DEPENDENT TYPE 2 DIABETES MELLITUS (H): ICD-10-CM

## 2019-01-10 LAB
GLUCOSE BLDC GLUCOMTR-MCNC: 142 MG/DL
GLUCOSE BLDC GLUCOMTR-MCNC: 150 MG/DL

## 2019-01-15 ENCOUNTER — AMBULATORY - HEALTHEAST (OUTPATIENT)
Dept: VASCULAR SURGERY | Facility: CLINIC | Age: 74
End: 2019-01-15

## 2019-01-15 ENCOUNTER — AMBULATORY - HEALTHEAST (OUTPATIENT)
Dept: CARDIAC REHAB | Facility: HOSPITAL | Age: 74
End: 2019-01-15

## 2019-01-15 DIAGNOSIS — S91.302S WOUND, OPEN, FOOT, LEFT, SEQUELA: ICD-10-CM

## 2019-01-15 DIAGNOSIS — Z95.1 S/P CABG (CORONARY ARTERY BYPASS GRAFT): ICD-10-CM

## 2019-01-15 LAB
GLUCOSE BLDC GLUCOMTR-MCNC: 129 MG/DL
GLUCOSE BLDC GLUCOMTR-MCNC: 174 MG/DL

## 2019-01-17 ENCOUNTER — AMBULATORY - HEALTHEAST (OUTPATIENT)
Dept: CARDIAC REHAB | Facility: HOSPITAL | Age: 74
End: 2019-01-17

## 2019-01-17 DIAGNOSIS — Z95.1 S/P CABG (CORONARY ARTERY BYPASS GRAFT): ICD-10-CM

## 2019-01-17 LAB
GLUCOSE BLDC GLUCOMTR-MCNC: 117 MG/DL
GLUCOSE BLDC GLUCOMTR-MCNC: 151 MG/DL

## 2019-01-18 ENCOUNTER — AMBULATORY - HEALTHEAST (OUTPATIENT)
Dept: VASCULAR SURGERY | Facility: CLINIC | Age: 74
End: 2019-01-18

## 2019-01-18 DIAGNOSIS — L97.921 LEG ULCER, LEFT, LIMITED TO BREAKDOWN OF SKIN (H): ICD-10-CM

## 2019-01-18 DIAGNOSIS — S91.302S WOUND, OPEN, FOOT, LEFT, SEQUELA: ICD-10-CM

## 2019-01-22 ENCOUNTER — AMBULATORY - HEALTHEAST (OUTPATIENT)
Dept: CARDIAC REHAB | Facility: HOSPITAL | Age: 74
End: 2019-01-22

## 2019-01-22 ENCOUNTER — OFFICE VISIT - HEALTHEAST (OUTPATIENT)
Dept: VASCULAR SURGERY | Facility: CLINIC | Age: 74
End: 2019-01-22

## 2019-01-22 DIAGNOSIS — T14.8XXA LOCAL INFECTION OF WOUND: ICD-10-CM

## 2019-01-22 DIAGNOSIS — I73.9 PAD (PERIPHERAL ARTERY DISEASE) (H): ICD-10-CM

## 2019-01-22 DIAGNOSIS — Z95.1 S/P CABG (CORONARY ARTERY BYPASS GRAFT): ICD-10-CM

## 2019-01-22 DIAGNOSIS — I25.5 ISCHEMIC CARDIOMYOPATHY: ICD-10-CM

## 2019-01-22 DIAGNOSIS — E11.9 NON-INSULIN DEPENDENT TYPE 2 DIABETES MELLITUS (H): ICD-10-CM

## 2019-01-22 DIAGNOSIS — L08.9 LOCAL INFECTION OF WOUND: ICD-10-CM

## 2019-01-22 DIAGNOSIS — L97.922 ULCER OF LEFT LOWER EXTREMITY WITH FAT LAYER EXPOSED (H): ICD-10-CM

## 2019-01-22 DIAGNOSIS — L97.921 LEG ULCER, LEFT, LIMITED TO BREAKDOWN OF SKIN (H): ICD-10-CM

## 2019-01-22 DIAGNOSIS — I89.0 ACQUIRED LYMPHEDEMA OF LOWER EXTREMITY: ICD-10-CM

## 2019-01-22 DIAGNOSIS — L23.1 ALLERGIC CONTACT DERMATITIS DUE TO ADHESIVES: ICD-10-CM

## 2019-01-22 LAB — GLUCOSE BLDC GLUCOMTR-MCNC: 293 MG/DL (ref 70–139)

## 2019-01-25 ENCOUNTER — COMMUNICATION - HEALTHEAST (OUTPATIENT)
Dept: VASCULAR SURGERY | Facility: CLINIC | Age: 74
End: 2019-01-25

## 2019-01-28 ENCOUNTER — COMMUNICATION - HEALTHEAST (OUTPATIENT)
Dept: VASCULAR SURGERY | Facility: CLINIC | Age: 74
End: 2019-01-28

## 2019-01-29 ENCOUNTER — AMBULATORY - HEALTHEAST (OUTPATIENT)
Dept: VASCULAR SURGERY | Facility: CLINIC | Age: 74
End: 2019-01-29

## 2019-01-29 ENCOUNTER — RECORDS - HEALTHEAST (OUTPATIENT)
Dept: ADMINISTRATIVE | Facility: OTHER | Age: 74
End: 2019-01-29

## 2019-01-29 ENCOUNTER — AMBULATORY - HEALTHEAST (OUTPATIENT)
Dept: CARDIAC REHAB | Facility: HOSPITAL | Age: 74
End: 2019-01-29

## 2019-01-29 DIAGNOSIS — L97.922 ULCER OF LEFT LOWER EXTREMITY WITH FAT LAYER EXPOSED (H): ICD-10-CM

## 2019-01-29 DIAGNOSIS — Z95.1 S/P CABG (CORONARY ARTERY BYPASS GRAFT): ICD-10-CM

## 2019-01-29 LAB
GLUCOSE BLDC GLUCOMTR-MCNC: 192 MG/DL (ref 70–139)
GLUCOSE BLDC GLUCOMTR-MCNC: 262 MG/DL (ref 70–139)

## 2019-01-29 ASSESSMENT — MIFFLIN-ST. JEOR: SCORE: 1666.12

## 2019-01-31 ENCOUNTER — RECORDS - HEALTHEAST (OUTPATIENT)
Dept: LAB | Facility: CLINIC | Age: 74
End: 2019-01-31

## 2019-01-31 ENCOUNTER — AMBULATORY - HEALTHEAST (OUTPATIENT)
Dept: CARDIAC REHAB | Facility: HOSPITAL | Age: 74
End: 2019-01-31

## 2019-01-31 DIAGNOSIS — Z95.1 S/P CABG (CORONARY ARTERY BYPASS GRAFT): ICD-10-CM

## 2019-01-31 LAB
GLUCOSE BLDC GLUCOMTR-MCNC: 163 MG/DL (ref 70–139)
GLUCOSE BLDC GLUCOMTR-MCNC: 177 MG/DL (ref 70–139)

## 2019-02-01 LAB — INR PPP: 4.02 (ref 0.9–1.1)

## 2019-02-05 ENCOUNTER — OFFICE VISIT - HEALTHEAST (OUTPATIENT)
Dept: VASCULAR SURGERY | Facility: CLINIC | Age: 74
End: 2019-02-05

## 2019-02-05 ENCOUNTER — OFFICE VISIT - HEALTHEAST (OUTPATIENT)
Dept: PODIATRY | Facility: CLINIC | Age: 74
End: 2019-02-05

## 2019-02-05 ENCOUNTER — COMMUNICATION - HEALTHEAST (OUTPATIENT)
Dept: VASCULAR SURGERY | Facility: CLINIC | Age: 74
End: 2019-02-05

## 2019-02-05 DIAGNOSIS — L97.921 LEG ULCER, LEFT, LIMITED TO BREAKDOWN OF SKIN (H): ICD-10-CM

## 2019-02-05 DIAGNOSIS — B35.1 NAIL FUNGUS: ICD-10-CM

## 2019-02-05 DIAGNOSIS — L60.2 ONYCHAUXIS: ICD-10-CM

## 2019-02-05 DIAGNOSIS — Z79.4 TYPE 2 DIABETES MELLITUS WITHOUT COMPLICATION, WITH LONG-TERM CURRENT USE OF INSULIN (H): ICD-10-CM

## 2019-02-05 DIAGNOSIS — E11.9 NON-INSULIN DEPENDENT TYPE 2 DIABETES MELLITUS (H): ICD-10-CM

## 2019-02-05 DIAGNOSIS — I89.0 ACQUIRED LYMPHEDEMA OF LOWER EXTREMITY: ICD-10-CM

## 2019-02-05 DIAGNOSIS — E11.9 TYPE 2 DIABETES MELLITUS WITHOUT COMPLICATION, WITH LONG-TERM CURRENT USE OF INSULIN (H): ICD-10-CM

## 2019-02-05 DIAGNOSIS — L23.1 ALLERGIC CONTACT DERMATITIS DUE TO ADHESIVES: ICD-10-CM

## 2019-02-05 DIAGNOSIS — L97.922 ULCER OF LEFT LOWER EXTREMITY WITH FAT LAYER EXPOSED (H): ICD-10-CM

## 2019-02-05 DIAGNOSIS — I73.9 PAD (PERIPHERAL ARTERY DISEASE) (H): ICD-10-CM

## 2019-02-05 ASSESSMENT — MIFFLIN-ST. JEOR: SCORE: 1675.19

## 2019-02-07 ENCOUNTER — RECORDS - HEALTHEAST (OUTPATIENT)
Dept: ADMINISTRATIVE | Facility: OTHER | Age: 74
End: 2019-02-07

## 2019-02-12 ENCOUNTER — COMMUNICATION - HEALTHEAST (OUTPATIENT)
Dept: ADMINISTRATIVE | Facility: CLINIC | Age: 74
End: 2019-02-12

## 2019-02-14 ENCOUNTER — AMBULATORY - HEALTHEAST (OUTPATIENT)
Dept: VASCULAR SURGERY | Facility: CLINIC | Age: 74
End: 2019-02-14

## 2019-02-15 ENCOUNTER — RECORDS - HEALTHEAST (OUTPATIENT)
Dept: LAB | Facility: CLINIC | Age: 74
End: 2019-02-15

## 2019-02-15 LAB — INR PPP: 2.45 (ref 0.9–1.1)

## 2019-02-19 ENCOUNTER — COMMUNICATION - HEALTHEAST (OUTPATIENT)
Dept: VASCULAR SURGERY | Facility: CLINIC | Age: 74
End: 2019-02-19

## 2019-02-21 ENCOUNTER — AMBULATORY - HEALTHEAST (OUTPATIENT)
Dept: VASCULAR SURGERY | Facility: CLINIC | Age: 74
End: 2019-02-21

## 2019-02-21 DIAGNOSIS — L97.921 LEG ULCER, LEFT, LIMITED TO BREAKDOWN OF SKIN (H): ICD-10-CM

## 2019-02-21 ASSESSMENT — MIFFLIN-ST. JEOR: SCORE: 1675.19

## 2019-02-22 ENCOUNTER — RECORDS - HEALTHEAST (OUTPATIENT)
Dept: LAB | Facility: CLINIC | Age: 74
End: 2019-02-22

## 2019-02-22 LAB — INR PPP: 2.59 (ref 0.9–1.1)

## 2019-02-28 ENCOUNTER — AMBULATORY - HEALTHEAST (OUTPATIENT)
Dept: VASCULAR SURGERY | Facility: CLINIC | Age: 74
End: 2019-02-28

## 2019-03-05 ENCOUNTER — RECORDS - HEALTHEAST (OUTPATIENT)
Dept: LAB | Facility: CLINIC | Age: 74
End: 2019-03-05

## 2019-03-06 LAB — HBA1C MFR BLD: 10.2 % (ref 4.2–6.1)

## 2019-03-07 ENCOUNTER — AMBULATORY - HEALTHEAST (OUTPATIENT)
Dept: CARDIAC REHAB | Facility: HOSPITAL | Age: 74
End: 2019-03-07

## 2019-03-07 ENCOUNTER — AMBULATORY - HEALTHEAST (OUTPATIENT)
Dept: VASCULAR SURGERY | Facility: CLINIC | Age: 74
End: 2019-03-07

## 2019-03-07 DIAGNOSIS — I87.303 VENOUS HYPERTENSION, CHRONIC, BILATERAL: ICD-10-CM

## 2019-03-07 DIAGNOSIS — L97.921 LEG ULCER, LEFT, LIMITED TO BREAKDOWN OF SKIN (H): ICD-10-CM

## 2019-03-07 DIAGNOSIS — L97.922 ULCER OF LEFT LOWER EXTREMITY WITH FAT LAYER EXPOSED (H): ICD-10-CM

## 2019-03-07 DIAGNOSIS — Z95.1 S/P CABG (CORONARY ARTERY BYPASS GRAFT): ICD-10-CM

## 2019-03-07 LAB
GLUCOSE BLDC GLUCOMTR-MCNC: 233 MG/DL (ref 70–139)
GLUCOSE BLDC GLUCOMTR-MCNC: 266 MG/DL (ref 70–139)

## 2019-03-08 ENCOUNTER — RECORDS - HEALTHEAST (OUTPATIENT)
Dept: LAB | Facility: CLINIC | Age: 74
End: 2019-03-08

## 2019-03-08 LAB — INR PPP: 2.73 (ref 0.9–1.1)

## 2019-03-15 ENCOUNTER — OFFICE VISIT - HEALTHEAST (OUTPATIENT)
Dept: VASCULAR SURGERY | Facility: CLINIC | Age: 74
End: 2019-03-15

## 2019-03-15 DIAGNOSIS — E11.9 NON-INSULIN DEPENDENT TYPE 2 DIABETES MELLITUS (H): ICD-10-CM

## 2019-03-15 DIAGNOSIS — I87.303 VENOUS HYPERTENSION, CHRONIC, BILATERAL: ICD-10-CM

## 2019-03-15 DIAGNOSIS — I89.0 ACQUIRED LYMPHEDEMA OF LOWER EXTREMITY: ICD-10-CM

## 2019-03-15 DIAGNOSIS — L97.921 LEG ULCER, LEFT, LIMITED TO BREAKDOWN OF SKIN (H): ICD-10-CM

## 2019-03-15 ASSESSMENT — MIFFLIN-ST. JEOR: SCORE: 1690.16

## 2019-03-18 ENCOUNTER — COMMUNICATION - HEALTHEAST (OUTPATIENT)
Dept: CARDIOLOGY | Facility: CLINIC | Age: 74
End: 2019-03-18

## 2019-03-22 ENCOUNTER — RECORDS - HEALTHEAST (OUTPATIENT)
Dept: ADMINISTRATIVE | Facility: OTHER | Age: 74
End: 2019-03-22

## 2019-03-22 ENCOUNTER — AMBULATORY - HEALTHEAST (OUTPATIENT)
Dept: VASCULAR SURGERY | Facility: CLINIC | Age: 74
End: 2019-03-22

## 2019-03-22 ASSESSMENT — MIFFLIN-ST. JEOR: SCORE: 1688.8

## 2019-03-28 ENCOUNTER — RECORDS - HEALTHEAST (OUTPATIENT)
Dept: LAB | Facility: CLINIC | Age: 74
End: 2019-03-28

## 2019-03-28 LAB — INR PPP: 2.87 (ref 0.9–1.1)

## 2019-03-29 ENCOUNTER — AMBULATORY - HEALTHEAST (OUTPATIENT)
Dept: VASCULAR SURGERY | Facility: CLINIC | Age: 74
End: 2019-03-29

## 2019-03-29 DIAGNOSIS — L97.921 LEG ULCER, LEFT, LIMITED TO BREAKDOWN OF SKIN (H): ICD-10-CM

## 2019-03-29 DIAGNOSIS — I87.303 VENOUS HYPERTENSION, CHRONIC, BILATERAL: ICD-10-CM

## 2019-03-29 ASSESSMENT — MIFFLIN-ST. JEOR: SCORE: 1702.41

## 2019-04-01 ENCOUNTER — AMBULATORY - HEALTHEAST (OUTPATIENT)
Dept: CARDIOLOGY | Facility: CLINIC | Age: 74
End: 2019-04-01

## 2019-04-01 ENCOUNTER — RECORDS - HEALTHEAST (OUTPATIENT)
Dept: ADMINISTRATIVE | Facility: OTHER | Age: 74
End: 2019-04-01

## 2019-04-04 ENCOUNTER — OFFICE VISIT - HEALTHEAST (OUTPATIENT)
Dept: CARDIOLOGY | Facility: CLINIC | Age: 74
End: 2019-04-04

## 2019-04-04 DIAGNOSIS — I25.10 CORONARY ARTERY DISEASE DUE TO LIPID RICH PLAQUE: ICD-10-CM

## 2019-04-04 DIAGNOSIS — E78.5 DYSLIPIDEMIA, GOAL LDL BELOW 70: ICD-10-CM

## 2019-04-04 DIAGNOSIS — I25.83 CORONARY ARTERY DISEASE DUE TO LIPID RICH PLAQUE: ICD-10-CM

## 2019-04-04 DIAGNOSIS — I50.30 HEART FAILURE WITH PRESERVED EJECTION FRACTION, NYHA CLASS II (H): ICD-10-CM

## 2019-04-04 DIAGNOSIS — I48.0 PAROXYSMAL ATRIAL FIBRILLATION (H): ICD-10-CM

## 2019-04-04 ASSESSMENT — MIFFLIN-ST. JEOR: SCORE: 1670.66

## 2019-04-05 ENCOUNTER — OFFICE VISIT - HEALTHEAST (OUTPATIENT)
Dept: VASCULAR SURGERY | Facility: CLINIC | Age: 74
End: 2019-04-05

## 2019-04-05 ENCOUNTER — RECORDS - HEALTHEAST (OUTPATIENT)
Dept: ADMINISTRATIVE | Facility: OTHER | Age: 74
End: 2019-04-05

## 2019-04-05 DIAGNOSIS — I89.0 ACQUIRED LYMPHEDEMA OF LOWER EXTREMITY: ICD-10-CM

## 2019-04-05 DIAGNOSIS — E11.9 NON-INSULIN DEPENDENT TYPE 2 DIABETES MELLITUS (H): ICD-10-CM

## 2019-04-05 DIAGNOSIS — L97.921 LEG ULCER, LEFT, LIMITED TO BREAKDOWN OF SKIN (H): ICD-10-CM

## 2019-04-05 DIAGNOSIS — L23.1 ALLERGIC CONTACT DERMATITIS DUE TO ADHESIVES: ICD-10-CM

## 2019-04-05 DIAGNOSIS — I87.303 VENOUS HYPERTENSION, CHRONIC, BILATERAL: ICD-10-CM

## 2019-04-05 DIAGNOSIS — I25.5 ISCHEMIC CARDIOMYOPATHY: ICD-10-CM

## 2019-04-05 ASSESSMENT — MIFFLIN-ST. JEOR: SCORE: 1670.66

## 2019-04-09 ENCOUNTER — COMMUNICATION - HEALTHEAST (OUTPATIENT)
Dept: VASCULAR SURGERY | Facility: CLINIC | Age: 74
End: 2019-04-09

## 2019-04-11 ENCOUNTER — AMBULATORY - HEALTHEAST (OUTPATIENT)
Dept: VASCULAR SURGERY | Facility: CLINIC | Age: 74
End: 2019-04-11

## 2019-04-11 DIAGNOSIS — I87.303 VENOUS HYPERTENSION, CHRONIC, BILATERAL: ICD-10-CM

## 2019-04-11 ASSESSMENT — MIFFLIN-ST. JEOR: SCORE: 1675.19

## 2019-04-18 ENCOUNTER — OFFICE VISIT - HEALTHEAST (OUTPATIENT)
Dept: VASCULAR SURGERY | Facility: CLINIC | Age: 74
End: 2019-04-18

## 2019-04-18 DIAGNOSIS — I87.303 VENOUS HYPERTENSION, CHRONIC, BILATERAL: ICD-10-CM

## 2019-04-18 DIAGNOSIS — L97.921 LEG ULCER, LEFT, LIMITED TO BREAKDOWN OF SKIN (H): ICD-10-CM

## 2019-04-18 DIAGNOSIS — E11.9 NON-INSULIN DEPENDENT TYPE 2 DIABETES MELLITUS (H): ICD-10-CM

## 2019-04-18 DIAGNOSIS — I89.0 ACQUIRED LYMPHEDEMA OF LOWER EXTREMITY: ICD-10-CM

## 2019-04-19 ENCOUNTER — RECORDS - HEALTHEAST (OUTPATIENT)
Dept: ADMINISTRATIVE | Facility: OTHER | Age: 74
End: 2019-04-19

## 2019-04-25 ENCOUNTER — RECORDS - HEALTHEAST (OUTPATIENT)
Dept: ADMINISTRATIVE | Facility: OTHER | Age: 74
End: 2019-04-25

## 2019-04-25 ENCOUNTER — RECORDS - HEALTHEAST (OUTPATIENT)
Dept: LAB | Facility: CLINIC | Age: 74
End: 2019-04-25

## 2019-04-25 ENCOUNTER — AMBULATORY - HEALTHEAST (OUTPATIENT)
Dept: VASCULAR SURGERY | Facility: CLINIC | Age: 74
End: 2019-04-25

## 2019-04-25 LAB — INR PPP: 2.66 (ref 0.9–1.1)

## 2019-04-25 ASSESSMENT — MIFFLIN-ST. JEOR: SCORE: 1716.02

## 2019-04-30 ENCOUNTER — COMMUNICATION - HEALTHEAST (OUTPATIENT)
Dept: VASCULAR SURGERY | Facility: CLINIC | Age: 74
End: 2019-04-30

## 2019-05-02 ENCOUNTER — OFFICE VISIT - HEALTHEAST (OUTPATIENT)
Dept: VASCULAR SURGERY | Facility: CLINIC | Age: 74
End: 2019-05-02

## 2019-05-02 DIAGNOSIS — I87.303 VENOUS HYPERTENSION, CHRONIC, BILATERAL: ICD-10-CM

## 2019-05-02 DIAGNOSIS — I89.0 ACQUIRED LYMPHEDEMA OF LOWER EXTREMITY: ICD-10-CM

## 2019-05-02 DIAGNOSIS — L97.921 LEG ULCER, LEFT, LIMITED TO BREAKDOWN OF SKIN (H): ICD-10-CM

## 2019-05-02 DIAGNOSIS — L23.1 ALLERGIC CONTACT DERMATITIS DUE TO ADHESIVES: ICD-10-CM

## 2019-05-02 DIAGNOSIS — E11.9 NON-INSULIN DEPENDENT TYPE 2 DIABETES MELLITUS (H): ICD-10-CM

## 2019-05-09 ENCOUNTER — AMBULATORY - HEALTHEAST (OUTPATIENT)
Dept: VASCULAR SURGERY | Facility: CLINIC | Age: 74
End: 2019-05-09

## 2019-05-09 DIAGNOSIS — L97.922 ULCER OF LEFT LOWER EXTREMITY WITH FAT LAYER EXPOSED (H): ICD-10-CM

## 2019-05-09 DIAGNOSIS — I89.0 ACQUIRED LYMPHEDEMA OF LOWER EXTREMITY: ICD-10-CM

## 2019-05-09 DIAGNOSIS — L97.921 LEG ULCER, LEFT, LIMITED TO BREAKDOWN OF SKIN (H): ICD-10-CM

## 2019-05-16 ENCOUNTER — AMBULATORY - HEALTHEAST (OUTPATIENT)
Dept: VASCULAR SURGERY | Facility: CLINIC | Age: 74
End: 2019-05-16

## 2019-05-16 DIAGNOSIS — L97.921 LEG ULCER, LEFT, LIMITED TO BREAKDOWN OF SKIN (H): ICD-10-CM

## 2019-05-16 DIAGNOSIS — I89.0 ACQUIRED LYMPHEDEMA OF LOWER EXTREMITY: ICD-10-CM

## 2019-05-21 ENCOUNTER — RECORDS - HEALTHEAST (OUTPATIENT)
Dept: LAB | Facility: CLINIC | Age: 74
End: 2019-05-21

## 2019-05-21 LAB — HBA1C MFR BLD: 7.8 % (ref 4.2–6.1)

## 2019-05-22 ENCOUNTER — RECORDS - HEALTHEAST (OUTPATIENT)
Dept: LAB | Facility: CLINIC | Age: 74
End: 2019-05-22

## 2019-05-23 LAB — INR PPP: 2.32 (ref 0.9–1.1)

## 2019-05-29 ENCOUNTER — OFFICE VISIT - HEALTHEAST (OUTPATIENT)
Dept: VASCULAR SURGERY | Facility: CLINIC | Age: 74
End: 2019-05-29

## 2019-05-29 ENCOUNTER — RECORDS - HEALTHEAST (OUTPATIENT)
Dept: ADMINISTRATIVE | Facility: OTHER | Age: 74
End: 2019-05-29

## 2019-05-29 DIAGNOSIS — L97.921 LEG ULCER, LEFT, LIMITED TO BREAKDOWN OF SKIN (H): ICD-10-CM

## 2019-05-29 DIAGNOSIS — E11.9 NON-INSULIN DEPENDENT TYPE 2 DIABETES MELLITUS (H): ICD-10-CM

## 2019-05-29 DIAGNOSIS — I89.0 ACQUIRED LYMPHEDEMA OF LOWER EXTREMITY: ICD-10-CM

## 2019-05-29 DIAGNOSIS — I25.5 ISCHEMIC CARDIOMYOPATHY: ICD-10-CM

## 2019-05-29 DIAGNOSIS — L97.922 ULCER OF LEFT LOWER EXTREMITY WITH FAT LAYER EXPOSED (H): ICD-10-CM

## 2019-05-29 DIAGNOSIS — I87.303 VENOUS HYPERTENSION, CHRONIC, BILATERAL: ICD-10-CM

## 2019-06-05 ENCOUNTER — RECORDS - HEALTHEAST (OUTPATIENT)
Dept: ADMINISTRATIVE | Facility: OTHER | Age: 74
End: 2019-06-05

## 2019-06-05 ENCOUNTER — AMBULATORY - HEALTHEAST (OUTPATIENT)
Dept: VASCULAR SURGERY | Facility: CLINIC | Age: 74
End: 2019-06-05

## 2019-06-12 ENCOUNTER — AMBULATORY - HEALTHEAST (OUTPATIENT)
Dept: VASCULAR SURGERY | Facility: CLINIC | Age: 74
End: 2019-06-12

## 2019-06-19 ENCOUNTER — RECORDS - HEALTHEAST (OUTPATIENT)
Dept: LAB | Facility: CLINIC | Age: 74
End: 2019-06-19

## 2019-06-20 LAB — INR PPP: 2.52 (ref 0.9–1.1)

## 2019-07-03 ENCOUNTER — OFFICE VISIT - HEALTHEAST (OUTPATIENT)
Dept: VASCULAR SURGERY | Facility: CLINIC | Age: 74
End: 2019-07-03

## 2019-07-03 ENCOUNTER — RECORDS - HEALTHEAST (OUTPATIENT)
Dept: ADMINISTRATIVE | Facility: OTHER | Age: 74
End: 2019-07-03

## 2019-07-03 DIAGNOSIS — I87.303 VENOUS HYPERTENSION, CHRONIC, BILATERAL: ICD-10-CM

## 2019-07-03 DIAGNOSIS — I89.0 ACQUIRED LYMPHEDEMA OF LOWER EXTREMITY: ICD-10-CM

## 2019-07-03 DIAGNOSIS — I25.5 ISCHEMIC CARDIOMYOPATHY: ICD-10-CM

## 2019-07-03 DIAGNOSIS — E11.9 NON-INSULIN DEPENDENT TYPE 2 DIABETES MELLITUS (H): ICD-10-CM

## 2019-07-03 DIAGNOSIS — L97.921 LEG ULCER, LEFT, LIMITED TO BREAKDOWN OF SKIN (H): ICD-10-CM

## 2019-07-03 DIAGNOSIS — L97.922 ULCER OF LEFT LOWER EXTREMITY WITH FAT LAYER EXPOSED (H): ICD-10-CM

## 2019-07-03 ASSESSMENT — MIFFLIN-ST. JEOR: SCORE: 1669.75

## 2019-07-10 ENCOUNTER — AMBULATORY - HEALTHEAST (OUTPATIENT)
Dept: VASCULAR SURGERY | Facility: CLINIC | Age: 74
End: 2019-07-10

## 2019-07-10 DIAGNOSIS — L97.921 LEG ULCER, LEFT, LIMITED TO BREAKDOWN OF SKIN (H): ICD-10-CM

## 2019-07-22 ENCOUNTER — RECORDS - HEALTHEAST (OUTPATIENT)
Dept: LAB | Facility: CLINIC | Age: 74
End: 2019-07-22

## 2019-07-22 LAB — INR PPP: 2.31 (ref 0.9–1.1)

## 2019-07-25 ENCOUNTER — RECORDS - HEALTHEAST (OUTPATIENT)
Dept: LAB | Facility: CLINIC | Age: 74
End: 2019-07-25

## 2019-07-25 LAB
ANION GAP SERPL CALCULATED.3IONS-SCNC: 7 MMOL/L (ref 5–18)
BUN SERPL-MCNC: 19 MG/DL (ref 8–28)
CALCIUM SERPL-MCNC: 9.8 MG/DL (ref 8.5–10.5)
CHLORIDE BLD-SCNC: 103 MMOL/L (ref 98–107)
CO2 SERPL-SCNC: 28 MMOL/L (ref 22–31)
CREAT SERPL-MCNC: 1.37 MG/DL (ref 0.7–1.3)
ERYTHROCYTE [DISTWIDTH] IN BLOOD BY AUTOMATED COUNT: 13.7 % (ref 11–14.5)
GFR SERPL CREATININE-BSD FRML MDRD: 51 ML/MIN/1.73M2
GLUCOSE BLD-MCNC: 151 MG/DL (ref 70–125)
HCT VFR BLD AUTO: 40.3 % (ref 40–54)
HGB BLD-MCNC: 13.2 G/DL (ref 14–18)
MCH RBC QN AUTO: 27.7 PG (ref 27–34)
MCHC RBC AUTO-ENTMCNC: 32.8 G/DL (ref 32–36)
MCV RBC AUTO: 85 FL (ref 80–100)
PLATELET # BLD AUTO: 152 THOU/UL (ref 140–440)
PMV BLD AUTO: 10.4 FL (ref 8.5–12.5)
POTASSIUM BLD-SCNC: 3.9 MMOL/L (ref 3.5–5)
RBC # BLD AUTO: 4.76 MILL/UL (ref 4.4–6.2)
SODIUM SERPL-SCNC: 138 MMOL/L (ref 136–145)
TSH SERPL DL<=0.005 MIU/L-ACNC: 1.46 UIU/ML (ref 0.3–5)
VIT B12 SERPL-MCNC: 286 PG/ML (ref 213–816)
WBC: 6.2 THOU/UL (ref 4–11)

## 2019-07-26 LAB — HBA1C MFR BLD: 7.4 % (ref 4.2–6.1)

## 2019-08-05 ENCOUNTER — RECORDS - HEALTHEAST (OUTPATIENT)
Dept: ADMINISTRATIVE | Facility: OTHER | Age: 74
End: 2019-08-05

## 2019-08-17 ENCOUNTER — ANESTHESIA - HEALTHEAST (OUTPATIENT)
Dept: SURGERY | Facility: CLINIC | Age: 74
End: 2019-08-17

## 2019-08-18 ENCOUNTER — SURGERY - HEALTHEAST (OUTPATIENT)
Dept: SURGERY | Facility: CLINIC | Age: 74
End: 2019-08-18

## 2019-08-26 ENCOUNTER — AMBULATORY - HEALTHEAST (OUTPATIENT)
Dept: OTHER | Facility: CLINIC | Age: 74
End: 2019-08-26

## 2019-08-26 ENCOUNTER — DOCUMENTATION ONLY (OUTPATIENT)
Dept: OTHER | Facility: CLINIC | Age: 74
End: 2019-08-26

## 2020-03-10 ENCOUNTER — RECORDS - HEALTHEAST (OUTPATIENT)
Dept: LAB | Facility: CLINIC | Age: 75
End: 2020-03-10

## 2020-03-10 LAB
ALBUMIN SERPL-MCNC: 4.1 G/DL (ref 3.5–5)
ALP SERPL-CCNC: 123 U/L (ref 45–120)
ALT SERPL W P-5'-P-CCNC: 19 U/L (ref 0–45)
ANION GAP SERPL CALCULATED.3IONS-SCNC: 10 MMOL/L (ref 5–18)
AST SERPL W P-5'-P-CCNC: 24 U/L (ref 0–40)
BILIRUB DIRECT SERPL-MCNC: 0.3 MG/DL
BILIRUB SERPL-MCNC: 0.9 MG/DL (ref 0–1)
BUN SERPL-MCNC: 19 MG/DL (ref 8–28)
CALCIUM SERPL-MCNC: 9.8 MG/DL (ref 8.5–10.5)
CHLORIDE BLD-SCNC: 103 MMOL/L (ref 98–107)
CHOLEST SERPL-MCNC: 116 MG/DL
CO2 SERPL-SCNC: 26 MMOL/L (ref 22–31)
CREAT SERPL-MCNC: 1.41 MG/DL (ref 0.7–1.3)
ERYTHROCYTE [DISTWIDTH] IN BLOOD BY AUTOMATED COUNT: 12.6 % (ref 11–14.5)
FASTING STATUS PATIENT QL REPORTED: ABNORMAL
GFR SERPL CREATININE-BSD FRML MDRD: 49 ML/MIN/1.73M2
GLUCOSE BLD-MCNC: 136 MG/DL (ref 70–125)
HBA1C MFR BLD: 7.2 %
HCT VFR BLD AUTO: 43.3 % (ref 40–54)
HDLC SERPL-MCNC: 36 MG/DL
HGB BLD-MCNC: 14 G/DL (ref 14–18)
LDLC SERPL CALC-MCNC: 62 MG/DL
MCH RBC QN AUTO: 28.3 PG (ref 27–34)
MCHC RBC AUTO-ENTMCNC: 32.3 G/DL (ref 32–36)
MCV RBC AUTO: 88 FL (ref 80–100)
PLATELET # BLD AUTO: 151 THOU/UL (ref 140–440)
PMV BLD AUTO: 10.7 FL (ref 8.5–12.5)
POTASSIUM BLD-SCNC: 4.5 MMOL/L (ref 3.5–5)
PROT SERPL-MCNC: 7.8 G/DL (ref 6–8)
RBC # BLD AUTO: 4.94 MILL/UL (ref 4.4–6.2)
SODIUM SERPL-SCNC: 139 MMOL/L (ref 136–145)
TRIGL SERPL-MCNC: 89 MG/DL
WBC: 7.1 THOU/UL (ref 4–11)

## 2020-07-27 ENCOUNTER — RECORDS - HEALTHEAST (OUTPATIENT)
Dept: LAB | Facility: CLINIC | Age: 75
End: 2020-07-27

## 2020-07-28 LAB
ALBUMIN SERPL-MCNC: 4.5 G/DL (ref 3.5–5)
ALP SERPL-CCNC: 125 U/L (ref 45–120)
ALT SERPL W P-5'-P-CCNC: 21 U/L (ref 0–45)
ANION GAP SERPL CALCULATED.3IONS-SCNC: 12 MMOL/L (ref 5–18)
AST SERPL W P-5'-P-CCNC: 24 U/L (ref 0–40)
BILIRUB DIRECT SERPL-MCNC: 0.4 MG/DL
BILIRUB SERPL-MCNC: 0.9 MG/DL (ref 0–1)
BUN SERPL-MCNC: 31 MG/DL (ref 8–28)
CALCIUM SERPL-MCNC: 10.1 MG/DL (ref 8.5–10.5)
CHLORIDE BLD-SCNC: 104 MMOL/L (ref 98–107)
CO2 SERPL-SCNC: 21 MMOL/L (ref 22–31)
CREAT SERPL-MCNC: 1.52 MG/DL (ref 0.7–1.3)
ERYTHROCYTE [DISTWIDTH] IN BLOOD BY AUTOMATED COUNT: 12.6 % (ref 11–14.5)
GFR SERPL CREATININE-BSD FRML MDRD: 45 ML/MIN/1.73M2
GLUCOSE BLD-MCNC: 120 MG/DL (ref 70–125)
HBA1C MFR BLD: 7.6 %
HCT VFR BLD AUTO: 45.6 % (ref 40–54)
HGB BLD-MCNC: 15.2 G/DL (ref 14–18)
MCH RBC QN AUTO: 28.6 PG (ref 27–34)
MCHC RBC AUTO-ENTMCNC: 33.3 G/DL (ref 32–36)
MCV RBC AUTO: 86 FL (ref 80–100)
PLATELET # BLD AUTO: 175 THOU/UL (ref 140–440)
PMV BLD AUTO: 11.3 FL (ref 8.5–12.5)
POTASSIUM BLD-SCNC: 4.6 MMOL/L (ref 3.5–5)
PROT SERPL-MCNC: 8.6 G/DL (ref 6–8)
RBC # BLD AUTO: 5.31 MILL/UL (ref 4.4–6.2)
SODIUM SERPL-SCNC: 137 MMOL/L (ref 136–145)
TSH SERPL DL<=0.005 MIU/L-ACNC: 2.45 UIU/ML (ref 0.3–5)
WBC: 9 THOU/UL (ref 4–11)

## 2020-11-11 ENCOUNTER — COMMUNICATION - HEALTHEAST (OUTPATIENT)
Dept: CARDIOLOGY | Facility: CLINIC | Age: 75
End: 2020-11-11

## 2020-11-12 ENCOUNTER — OFFICE VISIT - HEALTHEAST (OUTPATIENT)
Dept: CARDIOLOGY | Facility: CLINIC | Age: 75
End: 2020-11-12

## 2020-11-12 DIAGNOSIS — I25.83 CORONARY ARTERY DISEASE DUE TO LIPID RICH PLAQUE: ICD-10-CM

## 2020-11-12 DIAGNOSIS — I48.19 PERSISTENT ATRIAL FIBRILLATION (H): ICD-10-CM

## 2020-11-12 DIAGNOSIS — I50.30 HEART FAILURE WITH PRESERVED EJECTION FRACTION, NYHA CLASS II (H): ICD-10-CM

## 2020-11-12 DIAGNOSIS — E78.5 DYSLIPIDEMIA, GOAL LDL BELOW 70: ICD-10-CM

## 2020-11-12 DIAGNOSIS — N18.30 CHRONIC KIDNEY DISEASE, STAGE 3 (H): ICD-10-CM

## 2020-11-12 DIAGNOSIS — I25.10 CORONARY ARTERY DISEASE DUE TO LIPID RICH PLAQUE: ICD-10-CM

## 2020-11-12 ASSESSMENT — MIFFLIN-ST. JEOR: SCORE: 1675.19

## 2021-02-08 ENCOUNTER — RECORDS - HEALTHEAST (OUTPATIENT)
Dept: ADMINISTRATIVE | Facility: OTHER | Age: 76
End: 2021-02-08

## 2021-03-08 ENCOUNTER — RECORDS - HEALTHEAST (OUTPATIENT)
Dept: LAB | Facility: CLINIC | Age: 76
End: 2021-03-08

## 2021-03-09 LAB
ALBUMIN SERPL-MCNC: 4.2 G/DL (ref 3.5–5)
ALP SERPL-CCNC: 132 U/L (ref 45–120)
ALT SERPL W P-5'-P-CCNC: 11 U/L (ref 0–45)
ANION GAP SERPL CALCULATED.3IONS-SCNC: 10 MMOL/L (ref 5–18)
AST SERPL W P-5'-P-CCNC: 14 U/L (ref 0–40)
BILIRUB SERPL-MCNC: 1.2 MG/DL (ref 0–1)
BUN SERPL-MCNC: 28 MG/DL (ref 8–28)
CALCIUM SERPL-MCNC: 9.4 MG/DL (ref 8.5–10.5)
CHLORIDE BLD-SCNC: 105 MMOL/L (ref 98–107)
CHOLEST SERPL-MCNC: 91 MG/DL
CO2 SERPL-SCNC: 23 MMOL/L (ref 22–31)
CREAT SERPL-MCNC: 1.49 MG/DL (ref 0.7–1.3)
ERYTHROCYTE [DISTWIDTH] IN BLOOD BY AUTOMATED COUNT: 13.1 % (ref 11–14.5)
FASTING STATUS PATIENT QL REPORTED: ABNORMAL
GFR SERPL CREATININE-BSD FRML MDRD: 46 ML/MIN/1.73M2
GLUCOSE BLD-MCNC: 157 MG/DL (ref 70–125)
HBA1C MFR BLD: 6.9 %
HCT VFR BLD AUTO: 43.7 % (ref 40–54)
HDLC SERPL-MCNC: 27 MG/DL
HGB BLD-MCNC: 14.4 G/DL (ref 14–18)
LDLC SERPL CALC-MCNC: 33 MG/DL
MCH RBC QN AUTO: 28.1 PG (ref 27–34)
MCHC RBC AUTO-ENTMCNC: 33 G/DL (ref 32–36)
MCV RBC AUTO: 85 FL (ref 80–100)
PLATELET # BLD AUTO: 197 THOU/UL (ref 140–440)
PMV BLD AUTO: 10.5 FL (ref 8.5–12.5)
POTASSIUM BLD-SCNC: 4.3 MMOL/L (ref 3.5–5)
PROT SERPL-MCNC: 8.1 G/DL (ref 6–8)
RBC # BLD AUTO: 5.12 MILL/UL (ref 4.4–6.2)
SODIUM SERPL-SCNC: 138 MMOL/L (ref 136–145)
TRIGL SERPL-MCNC: 153 MG/DL
TSH SERPL DL<=0.005 MIU/L-ACNC: 2.06 UIU/ML (ref 0.3–5)
VIT B12 SERPL-MCNC: 219 PG/ML (ref 213–816)
WBC: 7.2 THOU/UL (ref 4–11)

## 2021-04-27 NOTE — IP AVS SNAPSHOT
` `     [address] [facility] BANK: [phone]            Medication Administration Report for Ever Crane as of 06/30/18 1341   Legend:    Given Hold Not Given Due Canceled Entry Other Actions    Time Time (Time) Time  Time-Action       Inactive    Active    Linked        Medications 06/24/18 06/25/18 06/26/18 06/27/18 06/28/18 06/29/18 06/30/18    acetaminophen (TYLENOL) tablet 1,000 mg  Dose: 1,000 mg  Freq: EVERY 6 HOURS PRN Route: PO  PRN Reasons: mild pain,fever  Start: 06/26/18 0304   Admin Instructions: Maximum acetaminophen dose from all sources = 75 mg/kg/day not to exceed 4 gram    Admin. Amount: 2 tablet (2 × 500 mg tablet)  Last Admin: 06/27/18 2041  Dispense Loc: [facility] ADS 5B1       0350 (1,000 mg)-Given        2041 (1,000 mg)-Given              albuterol neb solution 2.5 mg  Dose: 2.5 mg  Freq: EVERY 2 HOURS PRN Route: NEBULIZATION  PRN Reasons: wheezing,shortness of breath / dyspnea  Start: 06/26/18 0308   Admin. Amount: 2.5 mg = 3 mL Conc: 2.5 mg/3 mL  Dispense Loc: [facility] ADS 5B1  Volume: 3 mL               amoxicillin-clavulanate (AUGMENTIN) 875-125 MG per tablet 1 tablet  Dose: 1 tablet  Freq: EVERY 12 HOURS SCHEDULED Route: PO  Indications of Use: COMMUNITY ACQUIRED PNEUMONIA  Start: 06/29/18 1230   Admin Instructions: Dose adjusted per renal dosing policy.  Estimated CrCl = >50 mL/min. <br>    Admin. Amount: 1 tablet  Last Admin: 06/30/18 0837  Dispense Loc: [facility] ADS 5B1          1445 (1 tablet)-Given       2114 (1 tablet)-Given        0837 (1 tablet)-Given       [ ] 2000           glipiZIDE (GLUCOTROL) tablet 10 mg  Dose: 10 mg  Freq: EVERY MORNING BEFORE BREAKFAST Route: PO  Start: 06/30/18 0830   Admin Instructions: Take before or with meals.    Admin. Amount: 1 tablet (1 × 10 mg tablet)  Last Admin: 06/30/18 0850  Dispense Loc: [facility]           0850 (10 mg)-Given           glucose gel 15-30 g  Dose: 15-30 g  Freq: EVERY 15 MIN PRN Route: PO  PRN Reason: low blood  sugar  Start: 06/26/18 0529   Admin Instructions: Give 15 g for BG 51 to 69 mg/dL IF patient is conscious and able to swallow. Give 30 g for BG less than or equal to 50 mg/dL IF patient is conscious and able to swallow. Do NOT give glucose gel via enteral tube.  IF patient has enteral tube: give apple juice 120 mL (4 oz or 15 g of CHO) via enteral tube for BG 51 to 69 mg/dL.  Give apple juice 240 mL (8 oz or 30 g of CHO) via enteral tube for BG less than or equal to 50 mg/dL.    ~Oral gel is preferable for conscious and able to swallow patient.   ~IF gel unavailable or patient refuses may provide apple juice 120 mL (4 oz or 15 g of CHO). Document juice on I and O flowsheet.    Admin. Amount: 15-30 g  Dispense Loc: [facility] ADS 5B1  Volume: 93.75 mL              Or  dextrose 50 % injection 25-50 mL  Dose: 25-50 mL  Freq: EVERY 15 MIN PRN Route: IV  PRN Reason: low blood sugar  Start: 06/26/18 0529   Admin Instructions: Use if have IV access, BG less than 70 mg/dL and meet dose criteria below:  Dose if conscious and alert (or disorientated) and NPO = 25 mL  Dose if unconscious / not alert = 50 mL  Vesicant. For ordered doses up to 25 g, give IV Push undiluted. Give each 5g over 1 minute.    Admin. Amount: 25-50 mL  Dispense Loc: [facility] ADS 5B1  Infused Over: 1-5 Minutes  Volume: 50 mL              Or  glucagon injection 1 mg  Dose: 1 mg  Freq: EVERY 15 MIN PRN Route: SC  PRN Reason: low blood sugar  PRN Comment: May repeat x 1 only  Start: 06/26/18 0529   Admin Instructions: May give SQ or IM. ONLY use glucagon IF patient has NO IV access AND is UNABLE to swallow AND blood glucose is LESS than or EQUAL to 50 mg/dL.  If ordered IV, give IV Push over 1 minute. Reconstitute with 1mL sterile water.    Admin. Amount: 1 mg  Dispense Loc: [facility] ADS 5B1               ipratropium - albuterol 0.5 mg/2.5 mg/3 mL (DUONEB) neb solution 3 mL  Dose: 3 mL  Freq: ONCE Route: NEBULIZATION  Start: 06/27/18 2045   Admin. Amount: 3  "mL  Dispense Loc: [facility] ADS 5B1  Administrations Remainin  Volume: 3 mL                      lidocaine (LMX4) cream  Freq: EVERY 1 HOUR PRN Route: Top  PRN Reason: pain  PRN Comment: with VAD insertion or accessing implanted port.  Start: 18   Admin Instructions: Do NOT give if patient has a history of allergy to any local anesthetic or any \"марина\" product.  Apply 30 minutes prior to VAD insertion or port access. MAX Dose: 2.5 g (  of 5 g tube).    Dispense Loc: [facility] ADS 5B1               lidocaine (LMX4) cream  Freq: ONCE PRN Route: Top  PRN Reason: mild pain  PRN Comment: with VAD insertion or accessing implanted port,  Start: 18   Admin Instructions: Do NOT give if patient has a history of allergy to any local anesthetic or any \"марина\" product.   Apply 30 min prior to VAD insertion or port access.  MAX Dose:  2.5 gm (  of 5 gm tube)      Dispense Loc: [facility] ADS 5B1  Administrations Remainin               lidocaine 1 % 1 mL  Dose: 1 mL  Freq: EVERY 1 HOUR PRN Route: OTHER  PRN Comment: mild pain with VAD insertion or accessing implanted port  Start: 18   Admin Instructions: Do NOT give if patient has a history of allergy to any local anesthetic or any \"марина\" product. MAX dose 1 mL subcutaneous OR intradermal in divided doses.    Admin. Amount: 1 mL  Dispense Loc: [facility] ADS 5B1  Volume: 2 mL               lidocaine 1 % 1 mL  Dose: 1 mL  Freq: ONCE PRN Route: OTHER  PRN Comment: mild pain with VAD insertion or accessing implanted port,  Start: 18   Admin Instructions: Do NOT give if patient has a history of allergy to any local anesthetic or any \"марина\" product. MAX dose 1 mL subcutaneous OR intradermal in divided doses.    Admin. Amount: 1 mL  Dispense Loc: [facility] ADS 5B1  Administrations Remainin  Volume: 2 mL               lisinopril (PRINIVIL/ZESTRIL) tablet 10 mg  Dose: 10 mg  Freq: DAILY Route: PO  Start: 18 0800   Admin Instructions: " Hold for SBP <105, DBP <50    Admin. Amount: 1 tablet (1 × 10 mg tablet)  Last Admin: 06/30/18 0841  Dispense Loc: [facility] ADS 5B1          0809 (10 mg)-Given        0841 (10 mg)-Given           magnesium sulfate 2 g in water intermittent infusion  Dose: 2 g  Freq: DAILY PRN Route: IV  PRN Reason: magnesium supplementation  Last Dose: Stopped (06/29/18 1140)  Start: 06/29/18 0945   Admin Instructions: For Serum Mg++ 1.6 - 2 mg/dL  Give 2 g and recheck magnesium level next AM.    Admin. Amount: 2 g = 50 mL Conc: 0.04 g/mL  Last Admin: 06/29/18 1125  Dispense Loc: [facility] ADS 5B1  Infused Over: 60 Minutes  Volume: 50 mL          1003 (2 g)-New Bag       1125 (2 g)-Rate/Dose Verify       1140-Stopped            magnesium sulfate 4 g in 100 mL sterile water (premade)  Dose: 4 g  Freq: EVERY 4 HOURS PRN Route: IV  PRN Reason: magnesium supplementation  Start: 06/29/18 0945   Admin Instructions: For serum Mg++ less than 1.6 mg/dL  Give 4 g and recheck magnesium level 2 hours after dose, and next AM.    Admin. Amount: 4 g = 100 mL Conc: 4 g/100 mL  Dispense Loc: [facility] Pharmacy  Infused Over: 120 Minutes  Volume: 100 mL               melatonin tablet 1 mg  Dose: 1 mg  Freq: AT BEDTIME PRN Route: PO  PRN Reason: sleep  Start: 06/27/18 0205   Admin. Amount: 1 tablet (1 × 1 mg tablet)  Last Admin: 06/27/18 0315  Dispense Loc: [facility] ADS 5B1        0315 (1 mg)-Given              metoprolol succinate (TOPROL-XL) 24 hr tablet 50 mg  Dose: 50 mg  Freq: 2 TIMES DAILY Route: PO  Start: 06/26/18 2000   Admin Instructions: Hold for SBP less than 105 or HR less than 60        DO NOT CRUSH. Tablet may be split in half along score line.    Admin. Amount: 1 tablet (1 × 50 mg tablet)  Last Admin: 06/30/18 0841  Dispense Loc: [facility] ADS 5B1       2051 (50 mg)-Given        0757 (50 mg)-Given       1949 (50 mg)-Given        0743 (50 mg)-Given       2015 (50 mg)-Given        0808 (50 mg)-Given       2116 (50 mg)-Given        0841 (50  mg)-Given       [ ] 2000           miconazole (MICATIN) 2 % cream  Freq: 2 TIMES DAILY Route: Top  Start: 06/26/18 0800   Admin Instructions: Apply to groin    Last Admin: 06/30/18 0842  Dispense Loc: [facility]       0859 ( )-Given       2051 ( )-Given        0804 ( )-Given       1950 ( )-Given        0743 ( )-Given       2017 ( )-Given        0809 ( )-Given       (2129)-Not Given        0842 ( )-Given       [ ] 2000           miconazole (MICATIN; MICRO GUARD) 2 % powder  Freq: DAILY Route: Top  Start: 06/26/18 1400   Admin Instructions: Apply to groin    Last Admin: 06/29/18 1333  Dispense Loc: [facility]       1328 ( )-Given        1751 ( )-Given        1434 ( )-Given        1333 ( )-Given        (1324)-Not Given           omeprazole (priLOSEC) CR capsule 20 mg  Dose: 20 mg  Freq: EVERY MORNING BEFORE BREAKFAST Route: PO  Start: 06/26/18 0730   Admin. Amount: 1 capsule (1 × 20 mg capsule)  Last Admin: 06/30/18 0836  Dispense Loc: [facility] ADS 5B1       0859 (20 mg)-Given        0758 (20 mg)-Given        0743 (20 mg)-Given        0809 (20 mg)-Given        0836 (20 mg)-Given           ondansetron (ZOFRAN-ODT) ODT tab 4 mg  Dose: 4 mg  Freq: EVERY 6 HOURS PRN Route: PO  PRN Reasons: nausea,vomiting  Start: 06/26/18 0305   Admin Instructions: This is Step 1 of nausea and vomiting management.  If nausea not resolved in 15 minutes, go to Step 2 prochlorperazine (COMPAZINE). Do not push through foil backing. Peel back foil and gently remove. Place on tongue immediately. Administration with liquid unnecessary  With dry hands, peel back foil backing and gently remove tablet; do not push oral disintegrating tablet through foil backing; administer immediately on tongue and oral disintegrating tablet dissolves in seconds; then swallow with saliva; liquid not required.    Admin. Amount: 1 tablet (1 × 4 mg tablet)  Dispense Loc: [facility] ADS 5B1              Or  ondansetron (ZOFRAN) injection 4 mg  Dose: 4  mg  Freq: EVERY 6 HOURS PRN Route: IV  PRN Reasons: nausea,vomiting  Start: 06/26/18 0305   Admin Instructions: This is Step 1 of nausea and vomiting management.  If nausea not resolved in 15 minutes, go to Step 2 prochlorperazine (COMPAZINE).  Irritant. For ordered doses up to 4 mg, give IV Push undiluted over 2-5 minutes.    Admin. Amount: 4 mg = 2 mL Conc: 4 mg/2 mL  Dispense Loc: [facility] ADS 5B1  Infused Over: 2-5 Minutes  Volume: 2 mL               potassium chloride (KLOR-CON) Packet 20-40 mEq  Dose: 20-40 mEq  Freq: EVERY 2 HOURS PRN Route: ORAL OR FEED  PRN Reason: potassium supplementation  Start: 06/29/18 0945   Admin Instructions: Use if unable to tolerate tablets.    If Serum K+ 3.4-4.0, dose = 20 mEq x1. Recheck K+ level the next AM.  If Serum K+ 3.0-3.3, dose = 60 mEq po total dose (40 mEq x 1 followed in 2 hours by 20 mEq X1). Recheck K+ level 4 hours after dose and the next AM.  If Serum K+ 2.5-2.9, dose = 80 mEq po total dose (40 mEq Q2H x2). Recheck K+ level 4 hours after dose and the next AM.  If Serum K+ less than 2.5, See IV order.  Dissolve packet contents in 4-8 ounces of cold water or juice.    Admin. Amount: 20-40 mEq  Dispense Loc: [facility] ADS 5B1               potassium chloride 10 mEq in 100 mL intermittent infusion with 10 mg lidocaine  Dose: 10 mEq  Freq: EVERY 1 HOUR PRN Route: IV  PRN Reason: potassium supplementation  Start: 06/29/18 0945   Admin Instructions: Infuse via PERIPHERAL LINE. Use potassium with lidocaine for pain with peripheral administration.  If Serum K+ 3.4-4.0, dose = 10 mEq/hr x2 doses. Recheck K+ level the next AM.  If Serum K+ 3.0-3.3, dose = 10 mEq/hr x4 doses (40 mEq IV total dose). Recheck K+ level 2 hours after dose and the next AM.  If Serum K+ less than 3.0, dose = 10 mEq/hr x6 doses (60 mEq IV total dose). Recheck K+ level 2 hours after dose and the next AM.    Admin. Amount: 10 mEq = 100 mL Conc: 10 mEq/100 mL  Dispense Loc: [facility] ADS 5B1  Infused Over:  1 Hours  Volume: 100 mL               potassium chloride 10 mEq in 100 mL sterile water intermittent infusion (premix)  Dose: 10 mEq  Freq: EVERY 1 HOUR PRN Route: IV  PRN Reason: potassium supplementation  Start: 06/29/18 0945   Admin Instructions: Infuse via PERIPHERAL LINE or CENTRAL LINE. Use for central line replacement if patient weight less than 65 kg, if patient is on TPN with high potassium content or if unit does not stock 20 mEq bags.  If Serum K+ 3.4-4.0, dose = 10 mEq/hr x2 doses. Recheck K+ level the next AM.  If Serum K+ 3.0-3.3, dose = 10 mEq/hr x4 doses (40 mEq IV total dose). Recheck K+ level 2 hours after dose and the next AM.  If Serum K+ less than 3.0, dose = 10 mEq/hr x6 doses (60 mEq IV total dose). Recheck K+ level 2 hours after dose and the next AM.    Admin. Amount: 10 mEq = 100 mL Conc: 10 mEq/100 mL  Dispense Loc: [facility] ADS 5B1  Infused Over: 60 Minutes  Volume: 100 mL               potassium chloride SA (K-DUR/KLOR-CON M) CR tablet 20-40 mEq  Dose: 20-40 mEq  Freq: EVERY 2 HOURS PRN Route: PO  PRN Reason: potassium supplementation  Start: 06/29/18 0945   Admin Instructions: Use if able to take PO.   If Serum K+ 3.4-4.0, dose = 20 mEq x1. Recheck K+ level the next AM.  If Serum K+ 3.0-3.3, dose = 60 mEq po total dose (40 mEq x1 followed in 2 hours by 20 mEq x1). Recheck K+ level 4 hours after dose and the next AM.  If Serum K+ 2.5-2.9, dose = 80 mEq po total dose (40 mEq Q2H x2). Recheck K+ level 4 hours after dose and the next AM.  If Serum K+ less than 2.5, See IV order.  DO NOT CRUSH.    Admin. Amount: 2-4 tablet (2-4 × 10 mEq tablet)  Last Admin: 06/29/18 1006  Dispense Loc: [facility] ADS 5B1          1006 (20 mEq)-Given            simvastatin (ZOCOR) tablet 40 mg  Dose: 40 mg  Freq: AT BEDTIME Route: PO  Start: 06/26/18 2200   Admin. Amount: 2 tablet (2 × 20 mg tablet)  Last Admin: 06/29/18 2135  Dispense Loc: [facility] ADS 5B1       2136 (40 mg)-Given        2247 (40 mg)-Given         2225 (40 mg)-Given        2135 (40 mg)-Given        [ ] 2200           sodium chloride (PF) 0.9% PF flush 10 mL  Dose: 10 mL  Freq: ONCE Route: IV  Start: 18 1130   Admin. Amount: 10 mL  Dispense Loc: [facility] Floor Stock  Administrations Remainin  Volume: 10 mL       (1223)-Not Given               sodium chloride (PF) 0.9% PF flush 3 mL  Dose: 3 mL  Freq: EVERY 8 HOURS Route: IK  Start: 18 0315   Admin Instructions: And Q1H PRN, to lock peripheral IV dormant line.    Admin. Amount: 3 mL  Last Admin: 18 043  Dispense Loc: [facility] Floor Stock  Volume: 3 mL       (428)-Not Given       ()-Not Given       ()-Not Given        (434)-Not Given       ()-Not Given       ()-Not Given        (323)-Not Given       ()-Not Given        (3 mL)-Given        (318)-Not Given       ()-Not Given        (3 mL)-Given        (3 mL)-Given        432 (3 mL)-Given              [ ] 191           sodium chloride (PF) 0.9% PF flush 3 mL  Dose: 3 mL  Freq: EVERY 1 HOUR PRN Route: IK  PRN Reason: line flush  Start: 18 0305   Admin Instructions: for peripheral IV flush post IV meds    Admin. Amount: 3 mL  Dispense Loc: [facility] Floor Stock  Volume: 3 mL               sodium chloride (PF) 0.9% PF flush 3 mL  Dose: 3 mL  Freq: EVERY 8 HOURS Route: IV  Start: 18 194   Admin Instructions: And Q1H PRN, to lock peripheral IV dormant line.      Admin. Amount: 3 mL  Last Admin: 18 1220  Dispense Loc: [facility] Floor Stock  Volume: 3 mL      ()-Not Given        (428)-Not Given              ()-Not Given        (434)-Not Given       ()-Not Given       ()-Not Given        (324)-Not Given       ()-Not Given       ()-Not Given        (318)-Not Given       ()-Not Given       2128 (3 mL)-Given        0432 (3 mL)-Negative       1220 (3 mL)-Given       [ ]            sodium chloride (PF) 0.9% PF flush 3 mL  Dose: 3 mL  Freq: EVERY 1 HOUR PRN Route:  IV  PRN Reasons: line flush,post meds or blood draw  Start: 18 194   Admin Instructions: for peripheral IV flush post IV meds    Admin. Amount: 3 mL  Dispense Loc: [facility] Floor Stock  Volume: 3 mL               spironolactone (ALDACTONE) tablet 25 mg  Dose: 25 mg  Freq: DAILY Route: PO  Start: 18 0800   Admin. Amount: 1 tablet (1 × 25 mg tablet)  Last Admin: 18 0841  Dispense Loc: [facility] ADS 5B1          0809 (25 mg)-Given        0841 (25 mg)-Given           thiamine (B-1) 500 mg in sodium chloride 0.9 % 50 mL intermittent infusion  Dose: 500 mg  Freq: EVERY 8 HOURS Route: IV  Last Dose: 500 mg (18 1321)  Start: 18 0100   End: 18 0434   Admin. Amount: 500 mg  Last Admin: 18 1321  Dispense Loc: [facility]  Infused Over: 30 Minutes  Administrations Remainin (+1 in progress)  Volume: 50 mL   Mixture Administration Information:   Medication Type Amount   thiamine 100 MG/ML SOLN Medications 500 mg   sodium chloride 0.9 % SOLN Base 50 mL                 0137 (500 mg)-New Bag       0210-Stopped       1235 (500 mg)-New Bag       1300-Stopped       2044 (500 mg)-New Bag       2115-Stopped        0440 (500 mg)-New Bag       0514-Stopped       1333 (500 mg)-New Bag       1415-Stopped       2127 (500 mg)-New Bag        0412 (500 mg)-New Bag       1321 (500 mg)-New Bag       [ ] 2035           Warfarin Therapy Reminder (Check START DATE - warfarin may be starting in the FUTURE)  Dose: 1 each  Freq: CONTINUOUS PRN Route: XX  Start: 18 0528   Admin Instructions: *Note to reorder warfarin daily*  Pharmacy Warfarin Dosing Service  Patient is on Warfarin Therapy - check for daily order    Admin. Amount: 1 each  Dispense Loc: [facility]              Future Medications  Medications 18       glipiZIDE (GLUCOTROL) tablet 15 mg  Dose: 15 mg  Freq: EVERY EVENING Route: PO  Start: 18 1900   Admin  Instructions: Take before or with meals.    Admin. Amount: 1 tablet (1 × 5 mg tablet) + 1 tablet (1 × 10 mg tablet)  Dispense Loc: [facility]   Mixture Administration Information:   Medication Type Amount   glipiZIDE 5 MG TABS Medications 5 mg   glipiZIDE 10 MG TABS Medications 10 mg                   [ ] 1900          Completed Medications  Medications 18         Dose: 40 mg  Freq: ONCE Route: IV  Start: 18 1430   End: 18 151   Admin Instructions: For ordered doses up to 40 mg, give IV Push undiluted over 1-2 minutes.    Admin. Amount: 40 mg = 4 mL Conc: 10 mg/mL  Last Admin: 18 150  Dispense Loc: [facility] ADS 6DO  Infused Over: 1-2 Minutes  Administrations Remainin  Volume: 4 mL         1509 (40 mg)-Given               Dose: 40 mg  Freq: ONCE Route: IV  Start: 18 0030   End: 18 003   Admin Instructions: For ordered doses up to 40 mg, give IV Push undiluted over 1-2 minutes.    Admin. Amount: 40 mg = 4 mL Conc: 10 mg/mL  Last Admin: 18 0029  Dispense Loc: [facility] ADS 6DO  Infused Over: 1-2 Minutes  Administrations Remainin  Volume: 4 mL         0029 (40 mg)-Given               Dose: 2 mg  Freq: ONCE AT 6PM Route: PO  Start: 18 1800   End: 18   Admin Instructions: Dose per Pharmacy Warfarin dosing service.    Admin. Amount: 1 tablet (1 × 2 mg tablet)  Last Admin: 18  Dispense Loc: [facility]  Administrations Remainin        1752 (2 mg)-Given                Dose: 2.5 mg  Freq: ONCE AT 6PM Route: PO  Start: 18 1800   End: 18   Admin Instructions: Dose per Pharmacy Warfarin dosing service.    Admin. Amount: 1 tablet (1 × 2.5 mg tablet)  Last Admin: 18  Dispense Loc: [facility]  Administrations Remainin         1730 (2.5 mg)-Given               Dose: 4 mg  Freq: ONCE AT 6PM Route: PO  Start: 18 1800   End: 18  1823   Admin. Amount: 1 tablet (1 × 4 mg tablet)  Last Admin: 18  Dispense Loc: [facility]  Administrations Remainin          1823 (4 mg)-Given           Discontinued Medications  Medications 18         Dose: 2 g  Freq: EVERY 8 HOURS Route: IV  Indications of Use: COMMUNITY ACQUIRED PNEUMONIA  Last Dose: Stopped (18 0610)  Start: 18   End: 18 121   Admin. Amount: 2 g  Last Admin: 18 0533  Dispense Loc: [facility]  Infused Over: 30 Minutes  Volume: 20 mL        2246 (2 g)-New Bag       2323-Stopped        0537 (2 g)-New Bag       0610-Stopped       1432 (2 g)-New Bag       1513-Stopped       2224 (2 g)-New Bag       2300-Stopped        0533 (2 g)-New Bag       0610-Stopped       1212-Med Discontinued          Dose: 40 mg  Freq: ONCE Route: IV  Start: 18 124   End: 18 1243   Admin Instructions: For ordered doses up to 40 mg, give IV Push undiluted over 1-2 minutes.    Admin. Amount: 40 mg = 4 mL Conc: 10 mg/mL  Infused Over: 1-2 Minutes  Administrations Remainin  Volume: 4 mL          1243-Med Discontinued          Dose: 20 mg  Freq: DAILY PRN Route: PO  PRN Comment: weight gain > 2lbs in 24h  Start: 18   End: 18 013   Admin. Amount: 1 tablet (1 × 20 mg tablet)  Dispense Loc: [facility] ADS 6DO         0137-Med Discontinued           Dose: 1-5 Units  Freq: AT BEDTIME Route: SC  Start: 18   End: 18 0809   Admin Instructions: MEDIUM INSULIN RESISTANCE DOSING    Do Not give Bedtime Correction Insulin if BG less than  200.   For  - 249 give 1 units.   For  - 299 give 2 units.   For  - 349 give 3 units.   For  -399 give 4 units.   For BG greater than or equal to 400 give 5 units.  Notify provider if glucose greater than or equal to 350 mg/dL after administration of correction dose.  If given at mealtime, must be administered 5  min before meal or immediately after.    Admin. Amount: 1-5 Units  Dispense Loc: Contact Rx for dose  Volume: 3 mL       (2227)-Not Given        (2216)-Not Given        (2232)-Not Given        (2118)-Not Given        0809-Med Discontinued         Dose: 1-7 Units  Freq: 3 TIMES DAILY BEFORE MEALS Route: SC  Start: 06/26/18 0730   End: 06/30/18 0809   Admin Instructions: Correction Scale - MEDIUM INSULIN RESISTANCE DOSING     Do Not give Correction Insulin if Pre-Meal BG less than 140.   For Pre-Meal  - 189 give 1 unit.   For Pre-Meal  - 239 give 2 units.   For Pre-Meal  - 289 give 3 units.   For Pre-Meal  - 339 give 4 units.   For Pre-Meal - 399 give 5 units.   For Pre-Meal -449 give 6 units  For Pre-Meal BG greater than or equal to 450 give 7 units.   To be given with prandial insulin, and based on pre-meal blood glucose.    Notify provider if glucose greater than or equal to 350 mg/dL after administration of correction dose.  If given at mealtime, must be administered 5 min before meal or immediately after.    Admin. Amount: 1-7 Units  Last Admin: 06/29/18 1800  Dispense Loc: Contact Rx for dose  Volume: 3 mL       (0858)-Not Given       1327 (2 Units)-Given       (1822)-Not Given        0804 (1 Units)-Given       1212 (1 Units)-Given       (1744)-Not Given        0743 (1 Units)-Given       (1403)-Not Given [C]       1730 (1 Units)-Given [C]        (0809)-Not Given       1147 (3 Units)-Given       1800 (1 Units)-Given        0809-Med Discontinued  (0835)-Not Given             Dose: 750 mg  Freq: EVERY 24 HOURS Route: IV  Indications of Use: COMMUNITY ACQUIRED PNEUMONIA  Last Dose: Stopped (06/27/18 0930)  Start: 06/26/18 0630   End: 06/27/18 2121   Admin Instructions: Irritant. Administer at a rate of no greater than 100 mL/hr    Admin. Amount: 750 mg = 150 mL Conc: 750 mg/150 mL  Last Admin: 06/27/18 0757  Dispense Loc: [facility]  Infused Over: 90 Minutes       0916  (750 mg)-New Bag       1100-Stopped        0757 (750 mg)-New Bag       0930-Stopped       2121-Med Discontinued            Dose: 10 mg  Freq: DAILY Route: PO  Start: 06/26/18 0800   End: 06/28/18 0039   Admin. Amount: 1 tablet (1 × 10 mg tablet)  Last Admin: 06/27/18 0757  Dispense Loc: [facility] ADS 6DO       0859 (10 mg)-Given        0757 (10 mg)-Given        0039-Med Discontinued           Dose: 20 mEq  Freq: EVERY 1 HOUR PRN Route: IV  PRN Reason: potassium supplementation  Start: 06/29/18 0945   End: 06/29/18 0948   Admin Instructions: Infuse via CENTRAL LINE Only.  May need EKG if less than 65 kg or on TPN - Max rate is 0.3 mEq/kg/hr for patients not on EKG monitoring.    If Serum K+ 3.4-4.0, dose = 20 mEq/hr x1 doses. Recheck K+ level the next AM.  If Serum K+ 3.0-3.3, dose = 20 mEq/hr x2 doses (40 mEq IV total dose).  Recheck K+ level 2 hours after dose and the next AM.  If Serum K+ less than 3.0, dose = 20 mEq/hr x3 doses (60 mEq IV total dose). Recheck K+ level 2 hours after dose and the next AM.    Admin. Amount: 20 mEq = 50 mL Conc: 20 mEq/50 mL  Volume: 50 mL          0948-Med Discontinued          Rate: 75 mL/hr   Freq: CONTINUOUS Route: IV  Last Dose: Stopped (06/27/18 2121)  Start: 06/26/18 0600   End: 06/27/18 2105   Last Admin: 06/27/18 2102  Dispense Loc: [facility] Floor Stock  Volume: 1,000 mL       0903-ED Infusing on Admission/transfer       1332 ( )-New Bag       1600 ( )-Rate/Dose Verify       1705 ( )-Rate/Dose Verify       1823 ( )-Rate/Dose Verify       2000 ( )-Rate/Dose Verify       2100 ( )-Rate/Dose Verify       2136 ( )-New Bag       2200 ( )-Rate/Dose Verify       2300 ( )-Rate/Dose Verify        0000 ( )-Rate/Dose Verify       0100 (125 mL/hr)-Rate/Dose Verify       0200 ( )-Rate/Dose Verify       0300 ( )-Rate/Dose Verify       0400 ( )-Rate/Dose Verify       0454 ( )-New Bag       0500 ( )-Rate/Dose Verify       0600 ( )-Rate/Dose Verify       0700 ( )-Rate/Dose Verify       0800 (  )-Rate/Dose Verify       0924 ( )-Rate/Dose Verify       1000 ( )-Rate/Dose Verify       1100 ( )-Rate/Dose Verify       1212 ( )-Rate/Dose Verify       1300 ( )-Rate/Dose Verify       1400 ( )-Rate/Dose Verify       1500 ( )-Rate/Dose Verify       1609 ( )-Rate/Dose Verify       1745 ( )-Rate/Dose Verify       1752 ( )-New Bag       1900 ( )-Rate/Dose Verify       2000 ( )-Rate/Dose Verify       2102 ( )-Rate/Dose Change       2105-Med Discontinued  2121-Stopped                Dose: 1,500 mg  Freq: EVERY 12 HOURS Route: IV  Indications of Use: COMMUNITY ACQUIRED PNEUMONIA  Last Dose: Stopped (06/29/18 1332)  Start: 06/27/18 2300   End: 06/29/18 1212   Admin Instructions: For an adult with peripheral catheter and dose of 2-2.5 g, infuse over 2 hours.  Vesicant.    For peripheral catheter: If dose between 2-2.5 g, infuse over 2 hours.    For central catheter: If dose is below 2 g, dose may be infused over 1 hour.    Admin. Amount: 1,500 mg  Last Admin: 06/29/18 1141  Dispense Loc: [facility]  Infused Over: 90 Minutes  Volume: 250 mL   Mixture Administration Information:   Medication Type Amount   vancomycin 100 mg/mL SOLR Medications 1,500 mg   sodium chloride 0.9 % SOLN Base 250 mL           Current Line: Peripheral IV 06/29/18 Left;Posterior Upper forearm       2331 (1,500 mg)-New Bag        0030 (1,500 mg)-Rate/Dose Verify       0110-Stopped       1234 (1,500 mg)-New Bag       1432-Stopped       2318 (1,500 mg)-New Bag        0021 (1,500 mg)-Rate/Dose Verify       0122-Stopped       1141 (1,500 mg)-New Bag       1212-Med Discontinued  1332-Stopped         Medications 06/24/18 06/25/18 06/26/18 06/27/18 06/28/18 06/29/18 06/30/18         declines

## 2021-05-26 NOTE — PROGRESS NOTES
Nurse Visit    Chief Complaint: Patient presents to clinic for assement, and treatment of their ulcer and and swelling    Dressing on Arrival endoform, xeroform, abd, rolled gauze and 2 layer. Tegaderm on knee.    Allergies:   Allergies   Allergen Reactions     Latex      Penicillins Rash     Childhood rxn  -- tolerated cefazolin 10/4/18       Medications:   Current Outpatient Medications:      acetaminophen (TYLENOL) 325 MG tablet, Take 2 tablets (650 mg total) by mouth every 6 (six) hours as needed for pain., Disp: , Rfl: 0     albuterol (PROVENTIL) 2.5 mg /3 mL (0.083 %) nebulizer solution, Take 3 mL (2.5 mg total) by nebulization every 6 (six) hours as needed for wheezing or shortness of breath., Disp: , Rfl: 0     aluminum-magnesium hydroxide-simethicone (MAALOX ADVANCED) 200-200-20 mg/5 mL Susp, Take 30 mL by mouth every 4 (four) hours as needed., Disp: , Rfl:      aspirin 81 mg chewable tablet, Chew 1 tablet (81 mg total) daily., Disp: , Rfl: 0     bacitracin 500 unit/gram ointment, Apply 1 application topically daily. To chest tube sites, Disp: , Rfl:      bisacodyl (DULCOLAX) 5 mg EC tablet, Take 10 mg by mouth daily as needed for constipation., Disp: , Rfl:      carbamide peroxide (DEBROX) 6.5 % otic solution, Administer 2 drops into both ears at bedtime as needed.    , Disp: , Rfl:      cetirizine (ZYRTEC) 10 MG tablet, Take 10 mg by mouth daily., Disp: , Rfl:      dextromethorphan-guaifenesin (DIABETIC TUSSIN DM)  mg/5 mL Liqd, Take 10 mL by mouth 4 (four) times a day as needed., Disp: , Rfl:      diphenhydrAMINE (BENADRYL) 50 MG capsule, Take 50 mg by mouth 3 (three) times a day as needed for itching., Disp: , Rfl:      emollient (VANICREAM) Crea, Apply 1 application topically 3 (three) times a day as needed (dry skin)., Disp: , Rfl: 0     furosemide (LASIX) 20 MG tablet, Take 20 mg by mouth daily as needed (at noon, if weight increase 2lbs in 24 hours).    , Disp: , Rfl:      gentamicin  (GARAMYCIN) 0.1 % ointment, Apply 1 application topically daily as needed., Disp: , Rfl:      glipiZIDE (GLUCOTROL) 10 MG tablet, Take 10 mg by mouth daily before breakfast., Disp: , Rfl:      glipiZIDE (GLUCOTROL) 10 MG tablet, Take 15 mg by mouth every evening., Disp: , Rfl:      hydrocortisone 1 % ointment, Apply topically 2 (two) times a day. Apply to face for skin rash, Disp: 30 g, Rfl: 0     ketotifen (ZADITOR/ZYRTEC ITCHY EYES) 0.025 % (0.035 %) ophthalmic solution, Administer 1 drop to both eyes 2 (two) times a day., Disp: 10 mL, Rfl: 0     Lactobacillus acidophilus 100 mg (1 billion cell) cap, Take 1 capsule by mouth 2 (two) times a day., Disp: 60 capsule, Rfl: 0     MEPHYTON 5 mg tablet, , Disp: , Rfl: 0     miconazole (DESENEX) 2 % powder, Apply 1 application topically daily as needed for itching., Disp: , Rfl:      nitroglycerin (NITROSTAT) 0.4 MG SL tablet, Place 0.4 mg under the tongue every 5 (five) minutes as needed for chest pain. , Disp: , Rfl:      omeprazole (PRILOSEC) 20 MG capsule, Take 20 mg by mouth daily. , Disp: , Rfl:      simvastatin (ZOCOR) 40 MG tablet, Take 40 mg by mouth at bedtime. , Disp: , Rfl:      spironolactone (ALDACTONE) 25 MG tablet, Take 25 mg by mouth daily., Disp: , Rfl:      urea 10 % lotion, Apply 1 application topically daily as needed., Disp: , Rfl:      warfarin (COUMADIN/JANTOVEN) 4 MG tablet, Take 4 mg by mouth daily., Disp: , Rfl:     Current Facility-Administered Medications:      lidocaine 2 % jelly (XYLOCAINE), , Topical, PRN, Stacie, Miriam Johnson, CNP, 1 application at 01/10/19 0951    Vital Signs: /78   Pulse (!) 58   Temp 98  F (36.7  C) (Oral)   Ht 6' (1.829 m)   Wt 203 lb (92.1 kg)   BMI 27.53 kg/m        Assessment:    General:  Patient presents to clinic in no apparent distress.  Psychiatric:  Alert and oriented x3.   Lower extremity:  edema is present.    Integumentary:  Skin is uniformly warm, dry and pink.    Wound size:   VASC Wound  10/22/18 left medial leg vein harvest (below knee) (Active)   Pre Size Length 1 3/22/2019 12:00 PM   Pre Size Width 0.5 3/22/2019 12:00 PM   Pre Size Depth 0.1 3/22/2019 12:00 PM   Pre Total Sq cm 0.25 3/22/2019 12:00 PM   Post Size Length 1 3/15/2019 12:00 PM   Post Size Width 0.4 3/15/2019 12:00 PM   Post Size Depth 0.01 3/15/2019 12:00 PM   Post Total Sq cm 0.4 3/15/2019 12:00 PM   Undermined no 2019  3:00 PM   Tunneling no 2019  3:00 PM   Description scabbed 3/22/2019 12:00 PM   Prodcut Used Bactroban 10/22/2018  2:00 PM       VASC Wound 18 left lateral lower leg (Active)   Pre Size Length 1.2 3/22/2019 12:00 PM   Pre Size Width 1 3/22/2019 12:00 PM   Pre Size Depth 0.1 3/22/2019 12:00 PM   Pre Total Sq cm 1.2 3/22/2019 12:00 PM   Post Size Length 1 3/15/2019 12:00 PM   Post Size Width 0.3 3/15/2019 12:00 PM   Post Size Depth 0.01 3/15/2019 12:00 PM   Post Total Sq cm 0.3 3/15/2019 12:00 PM   Undermined n 2018  2:00 PM   Tunneling n 2018  2:00 PM   Description weeping 12/3/2018  1:00 PM       Wound 18 Non-pressure related ulcer Leg Right;Anterior (front) (Active)       Wound 19 Non-pressure related ulcer Leg Left (Active)       Wound 19 Non-pressure related ulcer Leg Right (Active)       Incision 10/04/18 Leg Left (Active)      Undermining is npt present.    The periwoundskin is WNL and maceration      Plan:         1. Patient will follow up in 5 days         2. Treatment provided will include irrigation and dressings to promote autolytic debridement and will be as listed below     Cleansed with: Normal Saline and Wound Cleanser    Protected skin with: Remedy Skin Repair and Cavilon     Dressings Applied: endoform, xeroform, abd rolled gauze, 2 layer    Compression Applied to the Left Le-Layer Coban    Compression Applied to the Right Leg: None    Offloading applied: none    Trial Products: no  Provider notified regarding concerns: no  Treatment Changes:  no    Educational Barriers: cognitive loss  Taught Regarding: Acitivity, Compliance, Compression and Dressing  Teaching Method: Exaplanation and DC sheet    .

## 2021-05-27 NOTE — TELEPHONE ENCOUNTER
Pt called and stated that he has shingles. He was wondering if he needed to come for a nurse visit on 4/11/19. Informed him he needs to come because he has 2 layer compression. Will note in the visit note that he has shingles so the nurse is aware that is doing the dressing change. The shingles are located on his stomach.

## 2021-05-27 NOTE — PATIENT INSTRUCTIONS - HE
Ever,    It was a pleasure to see you today at the [facility].     We will stop aspirin on or around the one year anniversary of your heart surgery which will be October 4, 2019.    You may have your teeth pulled. The warfarin can be held if that is necessary for the oral surgeon.    You will see Dr. Moe in one year after a new lipid profile.     Please call us if you have any questions or concerns about your heart.      Heladio Moe M.D.

## 2021-05-27 NOTE — TELEPHONE ENCOUNTER
Received a call from sister Marie regarding the rash that Ever has on his stomach. He is due for a 2-layer dressing change tomorrow with nursing staff and she is wondering if it is okay for him to come in tomorrow.    Placed call to Miguel Little, the director of nursing services at [facility] that was taking nursing questions to inquire if they had information on what type of rash Ever has. Marie states staff are entering room and taking care of him as usual and he is on medication. Requested Miguel call clinic back to let us know about the rash and Ever's ability to come to clinic tomorrow.

## 2021-05-27 NOTE — PATIENT INSTRUCTIONS - HE
- Please call us if your compression wraps fall more than 1-2 inches below the bend of the knee. Call if they are too painful. Call if they get wet. If it is a weekend and the wraps fall down, are too painful, or get wet take the wraps off and put on another form of compression. Compression such as velcro wraps, compression stockings, short stretch bandages, or tubular compression. Apply one of these until you can be seen in clinic. Please call us if you have any questions [phone].    - Treatment:  Layered Compression Bandaging (2-layer)    What is it?  The layered compression bandaging has a layer of absorbent material that will soak up drainage.     Why we do it.   This is done to treat swelling, wounds, or both.  This will in turn help circulation and healing.    How to care for your bandages.  The wraps need to be kept dry. If  the wraps become wet, remove them and call the clinic to have another wrap applied.    What to expect.  It is common for the wraps to be uncomfortable at the beginning. The first two days are usually the hardest; then they will become more comfortable.       Elevating your legs will help the discomfort. Try to elevate your legs as much as possible.    If rest and elevation does not help your discomfort, call your provider.  If your provider is not available you can remove the wrap and leave a message for further instructions.    - Swelling and Compression Therapy    Swelling in the legs can be caused by many reasons. No matter what the reason, treatment usually includes some type of compression. This may be done with a support sock, dressing, ace wrap, or layered wraps.     It is important to treat the swelling for many reasons. If the swelling is not treated you may develop blisters that can lead to ulcerations. This is caused when extra fluid goes into tissue causing damage and blocking blood flow to the tissue.     It is important that you wear your compression every day,  including days that you will be seen in clinic.     Compression is often the most important part of treating leg wounds. Without controlling the swelling it is often not possible to heal wounds.     Going without compression for even brief periods of time can be damaging to your legs and your health.  Your compression should be put on first thing in the morning. Take the compression off at night only when instructed by your care provider to do so. Sometimes wearing compression 24 hours a day will be recommended.       If you are having difficulty wearing your compression it is important to notify your care provider so that other options may be reviewed.    Thank you for choosing [facility]. Please call us if you have any questions [phone].

## 2021-05-28 NOTE — TELEPHONE ENCOUNTER
Pts sister called asking if Pt made it to his appt. With Miriam today. Writer informed her that he was a no show.

## 2021-05-28 NOTE — PATIENT INSTRUCTIONS - HE
"- Please bring in all of your compression that you have at home to the appointment with Heather on 5/29/19.   Swelling in the legs can be caused by many reasons. No matter what the reason, treatment usually includes some type of compression. Your compression should be put on first thing in the morning.They do not need to be worn at night while in bed. It is especially important to wear them with long periods of sitting/standing, long car rides or if you will be flying. If you do a lot of standing, it is good to do calf raises to help keep the blood pumping. If you sit a lot, it is good to get up periodically to walk around. Elevation of the foot of your bed 4-6\" helps the blood return back to where it is needed. Going without compression for even brief periods of time can be damaging to your legs and your health. Call us with any problems or questions [phone]        "

## 2021-05-28 NOTE — PROGRESS NOTES
Nurse Visit    Chief Complaint: Patient presents to clinic for assement, and treatment of their ulcer and and swelling    Dressing on Arrival endoform, xeroform, abd rolled gauze and 2 layer.    Allergies:   Allergies   Allergen Reactions     Latex      Penicillins Rash     Childhood rxn  -- tolerated cefazolin 10/4/18       Medications:   Current Outpatient Medications:      acetaminophen (TYLENOL) 325 MG tablet, Take 2 tablets (650 mg total) by mouth every 6 (six) hours as needed for pain., Disp: , Rfl: 0     albuterol (PROVENTIL) 2.5 mg /3 mL (0.083 %) nebulizer solution, Take 3 mL (2.5 mg total) by nebulization every 6 (six) hours as needed for wheezing or shortness of breath., Disp: , Rfl: 0     aluminum-magnesium hydroxide-simethicone (MAALOX ADVANCED) 200-200-20 mg/5 mL Susp, Take 30 mL by mouth every 4 (four) hours as needed., Disp: , Rfl:      aspirin 81 mg chewable tablet, Chew 1 tablet (81 mg total) daily., Disp: , Rfl: 0     bacitracin 500 unit/gram ointment, Apply 1 application topically daily. To chest tube sites, Disp: , Rfl:      bisacodyl (DULCOLAX) 5 mg EC tablet, Take 10 mg by mouth daily as needed for constipation., Disp: , Rfl:      carbamide peroxide (DEBROX) 6.5 % otic solution, Administer 2 drops into both ears at bedtime as needed.    , Disp: , Rfl:      cetirizine (ZYRTEC) 10 MG tablet, Take 10 mg by mouth daily., Disp: , Rfl:      dextromethorphan-guaifenesin (DIABETIC TUSSIN DM)  mg/5 mL Liqd, Take 10 mL by mouth 4 (four) times a day as needed., Disp: , Rfl:      diphenhydrAMINE (BENADRYL) 50 MG capsule, Take 50 mg by mouth 3 (three) times a day as needed for itching., Disp: , Rfl:      emollient (VANICREAM) Crea, Apply 1 application topically 3 (three) times a day as needed (dry skin)., Disp: , Rfl: 0     furosemide (LASIX) 20 MG tablet, Take 20 mg by mouth daily as needed (at noon, if weight increase 2lbs in 24 hours).    , Disp: , Rfl:      gentamicin (GARAMYCIN) 0.1 %  ointment, Apply 1 application topically daily as needed., Disp: , Rfl:      glipiZIDE (GLUCOTROL) 10 MG tablet, Take 10 mg by mouth daily before breakfast., Disp: , Rfl:      glipiZIDE (GLUCOTROL) 10 MG tablet, Take 15 mg by mouth every evening., Disp: , Rfl:      hydrocortisone 1 % ointment, Apply topically 2 (two) times a day. Apply to face for skin rash, Disp: 30 g, Rfl: 0     ketotifen (ZADITOR/ZYRTEC ITCHY EYES) 0.025 % (0.035 %) ophthalmic solution, Administer 1 drop to both eyes 2 (two) times a day., Disp: 10 mL, Rfl: 0     Lactobacillus acidophilus 100 mg (1 billion cell) cap, Take 1 capsule by mouth 2 (two) times a day., Disp: 60 capsule, Rfl: 0     MEPHYTON 5 mg tablet, , Disp: , Rfl: 0     miconazole (DESENEX) 2 % powder, Apply 1 application topically daily as needed for itching., Disp: , Rfl:      nitroglycerin (NITROSTAT) 0.4 MG SL tablet, Place 0.4 mg under the tongue every 5 (five) minutes as needed for chest pain. , Disp: , Rfl:      omeprazole (PRILOSEC) 20 MG capsule, Take 20 mg by mouth daily. , Disp: , Rfl:      simvastatin (ZOCOR) 40 MG tablet, Take 40 mg by mouth at bedtime. , Disp: , Rfl:      spironolactone (ALDACTONE) 25 MG tablet, Take 25 mg by mouth daily., Disp: , Rfl:      urea 10 % lotion, Apply 1 application topically daily as needed., Disp: , Rfl:      warfarin (COUMADIN/JANTOVEN) 4 MG tablet, Take 4 mg by mouth daily., Disp: , Rfl:     Current Facility-Administered Medications:      lidocaine 2 % jelly (XYLOCAINE), , Topical, PRN, Stacie, Miriam Johnson, CNP, 1 application at 04/18/19 1424    Vital Signs: /76   Pulse 88   Temp 97.9  F (36.6  C) (Oral)   Ht 6' (1.829 m)   Wt 209 lb (94.8 kg)   BMI 28.35 kg/m        Assessment:    General:  Patient presents to clinic in no apparent distress.  Psychiatric:  Alert and oriented x3.   Lower extremity:  edema is present.    Integumentary:  Skin is uniformly warm, dry and pink.    Wound size:   VASC Wound 10/22/18 left medial leg  vein harvest (below knee) (Active)   Pre Size Length 0.5 4/11/2019  1:00 PM   Pre Size Width 0.4 4/11/2019  1:00 PM   Pre Size Depth 0.1 4/11/2019  1:00 PM   Pre Total Sq cm 0.2 4/11/2019  1:00 PM   Post Size Length 0 4/5/2019 12:00 PM   Post Size Width 0 4/5/2019 12:00 PM   Post Size Depth 0 4/5/2019 12:00 PM   Post Total Sq cm 0.4 3/15/2019 12:00 PM   Undermined no 4/11/2019  1:00 PM   Tunneling no 4/11/2019  1:00 PM   Description scab 4/25/2019  1:00 PM   Prodcut Used Bactroban 10/22/2018  2:00 PM       VASC Wound 11/12/18 left lateral lower leg (Active)   Pre Size Length 0.9 4/18/2019  2:00 PM   Pre Size Width 0.9 4/18/2019  2:00 PM   Pre Size Depth 0.1 4/18/2019  2:00 PM   Pre Total Sq cm 0.81 4/18/2019  2:00 PM   Post Size Length 0.5 4/18/2019  2:00 PM   Post Size Width 0.8 4/18/2019  2:00 PM   Post Size Depth 0.01 3/15/2019 12:00 PM   Post Total Sq cm 0 4/5/2019 12:00 PM   Undermined no 4/11/2019  1:00 PM   Tunneling no 4/11/2019  1:00 PM   Description scabbed 4/25/2019  1:00 PM   Prodcut Used ABD Pad 4/25/2019  1:00 PM       VASC Wound 04/18/19 left lateral lower leg superior (Active)   Pre Size Length 0.7 4/25/2019  1:00 PM   Pre Size Width 0.8 4/25/2019  1:00 PM   Pre Size Depth 0.1 4/25/2019  1:00 PM   Pre Total Sq cm 0.56 4/25/2019  1:00 PM   Post Size Length 0.5 4/18/2019  2:00 PM   Post Size Width 0.5 4/18/2019  2:00 PM   Undermined n 4/25/2019  1:00 PM   Tunneling n 4/25/2019  1:00 PM   Description closed 4/25/2019  1:00 PM       Wound 08/11/18 Non-pressure related ulcer Leg Right;Anterior (front) (Active)       Wound 01/04/19 Non-pressure related ulcer Leg Left (Active)       Wound 01/04/19 Non-pressure related ulcer Leg Right (Active)       Incision 10/04/18 Leg Left (Active)      Undermining is not present.    The periwoundskin is WNL      Plan:         1. Patient will follow up in 7 days         2. Treatment provided will include irrigation and dressings to promote autolytic debridement and will  be as listed below     Cleansed with: Normal Saline and Wound Cleanser    Protected skin with: Remedy Skin Repair    Dressings Applied: xeroform , endoform abd, rolled gauze and 2 layer    Compression Applied to the Left Le-Layer Coban. Patient insisted on having the toes covered. Patient partially unwrapped and re-wrapped coban layer to achieve.    Compression Applied to the Right Leg: None    Offloading applied: None    Trial Products: no  Provider notified regarding concerns: no  Treatment Changes: no    Educational Barriers: cognitive loss  Taught Regarding: Acitivity, Compliance, Compression and Dressing  Teaching Method: Exaplanation and DC sheet

## 2021-05-28 NOTE — PATIENT INSTRUCTIONS - HE
Important Instructions   Make your appointment with the nurse weekly to have your dressing changed.               How to care for your wound your lateral leg ulcer    Cleanse wound with saline sent to you with your supplies or a wound wash found at the pharmacy.    *   Cut to fit the Endoform and cover with xeroform and place in or on the wound.      *   Cover the wound with ABD    *   Change dressinx/week    *   Apply 2-layer on the left leg for compression. Compression is needed to decrease your swelling and heal any sores.  Apply the tubigrip on the right leg.    *   Do not get your ulcer wet when you shower or take a bath.  You can cover it with cling wrap, a bag taped to your skin or a cast protector.    Apply one of the following moisturizers to your dry and/or cracking skin one to two times daily.  Majority of these are available at the pharmacy downstairs.  If you don't see the item on the shelf, ask the staff about it.  You can also use Aquphor.    *   Good nutrition is important for wound healing.  I recommend increasing your protein.  You can do this through your diet, nutritional supplements, and/or protein powder.      Please call [facility] before substituting any product    Call our clinic nurse at [phone] if there is an increase in drainage, pain, redness, or the wound size increases.  This maybe a sign of infection and require attention prior to the next appointment      Miriam Quinones, APRN, CNP, CWCN

## 2021-05-29 NOTE — PROGRESS NOTES
Nurse Visit    Chief Complaint: Patient presents to clinic for assement, and treatment of their ulcer and and swelling    Dressing on Arrival intact    Allergies:   Allergies   Allergen Reactions     Latex      Penicillins Rash     Childhood rxn  -- tolerated cefazolin 10/4/18       Medications:   Current Outpatient Medications:      acetaminophen (TYLENOL) 325 MG tablet, Take 2 tablets (650 mg total) by mouth every 6 (six) hours as needed for pain., Disp: , Rfl: 0     albuterol (PROVENTIL) 2.5 mg /3 mL (0.083 %) nebulizer solution, Take 3 mL (2.5 mg total) by nebulization every 6 (six) hours as needed for wheezing or shortness of breath., Disp: , Rfl: 0     aluminum-magnesium hydroxide-simethicone (MAALOX ADVANCED) 200-200-20 mg/5 mL Susp, Take 30 mL by mouth every 4 (four) hours as needed., Disp: , Rfl:      aspirin 81 mg chewable tablet, Chew 1 tablet (81 mg total) daily., Disp: , Rfl: 0     bacitracin 500 unit/gram ointment, Apply 1 application topically daily. To chest tube sites, Disp: , Rfl:      bisacodyl (DULCOLAX) 5 mg EC tablet, Take 10 mg by mouth daily as needed for constipation., Disp: , Rfl:      carbamide peroxide (DEBROX) 6.5 % otic solution, Administer 2 drops into both ears at bedtime as needed.    , Disp: , Rfl:      cetirizine (ZYRTEC) 10 MG tablet, Take 10 mg by mouth daily., Disp: , Rfl:      dextromethorphan-guaifenesin (DIABETIC TUSSIN DM)  mg/5 mL Liqd, Take 10 mL by mouth 4 (four) times a day as needed., Disp: , Rfl:      diphenhydrAMINE (BENADRYL) 50 MG capsule, Take 50 mg by mouth 3 (three) times a day as needed for itching., Disp: , Rfl:      emollient (VANICREAM) Crea, Apply 1 application topically 3 (three) times a day as needed (dry skin)., Disp: , Rfl: 0     furosemide (LASIX) 20 MG tablet, Take 20 mg by mouth daily as needed (at noon, if weight increase 2lbs in 24 hours).    , Disp: , Rfl:      gentamicin (GARAMYCIN) 0.1 % ointment, Apply 1 application topically  daily as needed., Disp: , Rfl:      glipiZIDE (GLUCOTROL) 10 MG tablet, Take 10 mg by mouth daily before breakfast., Disp: , Rfl:      glipiZIDE (GLUCOTROL) 10 MG tablet, Take 15 mg by mouth every evening., Disp: , Rfl:      hydrocortisone 1 % ointment, Apply topically 2 (two) times a day. Apply to face for skin rash, Disp: 30 g, Rfl: 0     ketotifen (ZADITOR/ZYRTEC ITCHY EYES) 0.025 % (0.035 %) ophthalmic solution, Administer 1 drop to both eyes 2 (two) times a day., Disp: 10 mL, Rfl: 0     Lactobacillus acidophilus 100 mg (1 billion cell) cap, Take 1 capsule by mouth 2 (two) times a day., Disp: 60 capsule, Rfl: 0     MEPHYTON 5 mg tablet, , Disp: , Rfl: 0     miconazole (DESENEX) 2 % powder, Apply 1 application topically daily as needed for itching., Disp: , Rfl:      nitroglycerin (NITROSTAT) 0.4 MG SL tablet, Place 0.4 mg under the tongue every 5 (five) minutes as needed for chest pain. , Disp: , Rfl:      omeprazole (PRILOSEC) 20 MG capsule, Take 20 mg by mouth daily. , Disp: , Rfl:      simvastatin (ZOCOR) 40 MG tablet, Take 40 mg by mouth at bedtime. , Disp: , Rfl:      spironolactone (ALDACTONE) 25 MG tablet, Take 25 mg by mouth daily., Disp: , Rfl:      urea 10 % lotion, Apply 1 application topically daily as needed., Disp: , Rfl:      warfarin (COUMADIN/JANTOVEN) 4 MG tablet, Take 4 mg by mouth daily., Disp: , Rfl:     Current Facility-Administered Medications:      lidocaine 2 % jelly (XYLOCAINE), , Topical, PRN, Miriam Quinoens, CNP, 1 application at 05/02/19 1400    Vital Signs: Pulse 80   Temp 98.7  F (37.1  C) (Oral)       Assessment:    General:  Patient presents to clinic in no apparent distress.  Psychiatric:  Alert and oriented x3.   Lower extremity:  edema is present.    Integumentary:  Skin is uniformly warm, dry and pink.    Wound size:   VASC Wound 10/22/18 left medial leg vein harvest (below knee) (Active)   Pre Size Length 0.5 4/11/2019  1:00 PM   Pre Size Width 0.4 4/11/2019  1:00 PM    Pre Size Depth 0.1 4/11/2019  1:00 PM   Pre Total Sq cm 0.2 4/11/2019  1:00 PM   Post Size Length 0 4/5/2019 12:00 PM   Post Size Width 0 4/5/2019 12:00 PM   Post Size Depth 0 4/5/2019 12:00 PM   Post Total Sq cm 0.4 3/15/2019 12:00 PM   Undermined no 4/11/2019  1:00 PM   Tunneling no 4/11/2019  1:00 PM   Description scab 5/16/2019 12:00 PM   Prodcut Used Bactroban 10/22/2018  2:00 PM       VASC Wound 05/02/19 left mid shin (Active)   Pre Size Length 0 5/16/2019 12:00 PM   Pre Size Width 0 5/16/2019 12:00 PM   Pre Size Depth 0 5/16/2019 12:00 PM   Pre Total Sq cm 0 5/9/2019  1:00 PM       VASC Wound 05/02/19 left medial ankle (Active)   Pre Size Length 0 5/16/2019 12:00 PM   Pre Size Width 0 5/16/2019 12:00 PM   Pre Size Depth 0 5/16/2019 12:00 PM   Pre Total Sq cm 0 5/16/2019 12:00 PM       VASC Wound 05/02/19 left middle medial leg (Active)   Pre Size Length 0 5/9/2019  1:00 PM   Pre Size Width 0 5/9/2019  1:00 PM   Pre Size Depth 0 5/9/2019  1:00 PM   Pre Total Sq cm 0 5/9/2019  1:00 PM       VASC Wound 05/29/19 Lt lateral shin superior (Active)   Pre Size Length 2 5/29/2019  1:00 PM   Pre Size Width 1.5 5/29/2019  1:00 PM   Pre Size Depth 0.1 5/29/2019  1:00 PM   Pre Total Sq cm 3 5/29/2019  1:00 PM       VASC Wound 05/29/19 Lt lateral shin distal (Active)   Pre Size Length 1.3 5/29/2019  1:00 PM   Pre Size Width 1.2 5/29/2019  1:00 PM   Pre Size Depth 0.1 5/29/2019  1:00 PM   Pre Total Sq cm 1.56 5/29/2019  1:00 PM       Wound 08/11/18 Non-pressure related ulcer Leg Right;Anterior (front) (Active)       Wound 01/04/19 Non-pressure related ulcer Leg Left (Active)       Wound 01/04/19 Non-pressure related ulcer Leg Right (Active)       Incision 10/04/18 Leg Left (Active)      Undermining is not present.    The periwoundskin is pink      Plan:         1. Patient will in 1 weeks         2. Treatment provided will include irrigation and dressings to promote autolytic debridement and will be as listed  "below     Cleansed with: Normal Saline and Wound Cleanser    Protected skin with: Remedy Skin Repair    Dressings Applied: ABD's\" \"Packing Strips    Compression Applied to the Left Leg: Tubigrip    Compression Applied to the Right Leg: Tubigrip    Offloading applied: None    Trial Products: no  Provider notified regarding concerns: no  Treatment Changes: yes, wound is healed did not use the silvercel    Educational Barriers: cognitive loss  Taught Regarding: Acitivity and Compression  Teaching Method: Exaplanation and Handout      "

## 2021-05-29 NOTE — PROGRESS NOTES
Nurse Visit    Chief Complaint: Patient presents to clinic for assement, and treatment of their ulcer and and swelling    Dressing on Arrival: abd pad, roll gauze    Allergies:   Allergies   Allergen Reactions     Latex      Penicillins Rash     Childhood rxn  -- tolerated cefazolin 10/4/18       Medications:   Current Outpatient Medications:      acetaminophen (TYLENOL) 325 MG tablet, Take 2 tablets (650 mg total) by mouth every 6 (six) hours as needed for pain., Disp: , Rfl: 0     albuterol (PROVENTIL) 2.5 mg /3 mL (0.083 %) nebulizer solution, Take 3 mL (2.5 mg total) by nebulization every 6 (six) hours as needed for wheezing or shortness of breath., Disp: , Rfl: 0     aluminum-magnesium hydroxide-simethicone (MAALOX ADVANCED) 200-200-20 mg/5 mL Susp, Take 30 mL by mouth every 4 (four) hours as needed., Disp: , Rfl:      aspirin 81 mg chewable tablet, Chew 1 tablet (81 mg total) daily., Disp: , Rfl: 0     bacitracin 500 unit/gram ointment, Apply 1 application topically daily. To chest tube sites, Disp: , Rfl:      bisacodyl (DULCOLAX) 5 mg EC tablet, Take 10 mg by mouth daily as needed for constipation., Disp: , Rfl:      carbamide peroxide (DEBROX) 6.5 % otic solution, Administer 2 drops into both ears at bedtime as needed.    , Disp: , Rfl:      cetirizine (ZYRTEC) 10 MG tablet, Take 10 mg by mouth daily., Disp: , Rfl:      dextromethorphan-guaifenesin (DIABETIC TUSSIN DM)  mg/5 mL Liqd, Take 10 mL by mouth 4 (four) times a day as needed., Disp: , Rfl:      diphenhydrAMINE (BENADRYL) 50 MG capsule, Take 50 mg by mouth 3 (three) times a day as needed for itching., Disp: , Rfl:      emollient (VANICREAM) Crea, Apply 1 application topically 3 (three) times a day as needed (dry skin)., Disp: , Rfl: 0     furosemide (LASIX) 20 MG tablet, Take 20 mg by mouth daily as needed (at noon, if weight increase 2lbs in 24 hours).    , Disp: , Rfl:      gentamicin (GARAMYCIN) 0.1 % ointment, Apply 1 application  topically daily as needed., Disp: , Rfl:      glipiZIDE (GLUCOTROL) 10 MG tablet, Take 10 mg by mouth daily before breakfast., Disp: , Rfl:      glipiZIDE (GLUCOTROL) 10 MG tablet, Take 15 mg by mouth every evening., Disp: , Rfl:      hydrocortisone 1 % ointment, Apply topically 2 (two) times a day. Apply to face for skin rash, Disp: 30 g, Rfl: 0     ketotifen (ZADITOR/ZYRTEC ITCHY EYES) 0.025 % (0.035 %) ophthalmic solution, Administer 1 drop to both eyes 2 (two) times a day., Disp: 10 mL, Rfl: 0     Lactobacillus acidophilus 100 mg (1 billion cell) cap, Take 1 capsule by mouth 2 (two) times a day., Disp: 60 capsule, Rfl: 0     MEPHYTON 5 mg tablet, , Disp: , Rfl: 0     miconazole (DESENEX) 2 % powder, Apply 1 application topically daily as needed for itching., Disp: , Rfl:      nitroglycerin (NITROSTAT) 0.4 MG SL tablet, Place 0.4 mg under the tongue every 5 (five) minutes as needed for chest pain. , Disp: , Rfl:      omeprazole (PRILOSEC) 20 MG capsule, Take 20 mg by mouth daily. , Disp: , Rfl:      simvastatin (ZOCOR) 40 MG tablet, Take 40 mg by mouth at bedtime. , Disp: , Rfl:      spironolactone (ALDACTONE) 25 MG tablet, Take 25 mg by mouth daily., Disp: , Rfl:      urea 10 % lotion, Apply 1 application topically daily as needed., Disp: , Rfl:      warfarin (COUMADIN/JANTOVEN) 4 MG tablet, Take 4 mg by mouth daily., Disp: , Rfl:     Current Facility-Administered Medications:      lidocaine 2 % jelly (XYLOCAINE), , Topical, PRN, Miriam Quinones, CNP, 1 application at 05/02/19 1400    Vital Signs: /78 (Patient Site: Left Arm, Patient Position: Sitting, Cuff Size: Adult Regular)   Pulse 92   Temp 98  F (36.7  C) (Oral)       Assessment:    General:  Patient presents to clinic in no apparent distress.  Psychiatric:  Alert and oriented x3.   Lower extremity:  edema is present.    Integumentary:  Skin is uniformly warm, dry and pink.    Wound size:   VASC Wound 10/22/18 left medial leg vein harvest  (below knee) (Active)   Pre Size Length 0.5 4/11/2019  1:00 PM   Pre Size Width 0.4 4/11/2019  1:00 PM   Pre Size Depth 0.1 4/11/2019  1:00 PM   Pre Total Sq cm 0.2 4/11/2019  1:00 PM   Post Size Length 0 4/5/2019 12:00 PM   Post Size Width 0 4/5/2019 12:00 PM   Post Size Depth 0 4/5/2019 12:00 PM   Post Total Sq cm 0.4 3/15/2019 12:00 PM   Undermined no 4/11/2019  1:00 PM   Tunneling no 4/11/2019  1:00 PM   Description scab 5/16/2019 12:00 PM   Prodcut Used Bactroban 10/22/2018  2:00 PM       VASC Wound 05/02/19 left mid shin (Active)   Pre Size Length 0 5/16/2019 12:00 PM   Pre Size Width 0 5/16/2019 12:00 PM   Pre Size Depth 0 5/16/2019 12:00 PM   Pre Total Sq cm 0 5/9/2019  1:00 PM   Description healed 6/12/2019  1:00 PM       VASC Wound 05/02/19 left medial ankle (Active)   Pre Size Length 0 5/16/2019 12:00 PM   Pre Size Width 0 5/16/2019 12:00 PM   Pre Size Depth 0 5/16/2019 12:00 PM   Pre Total Sq cm 0 5/16/2019 12:00 PM   Description healed 6/12/2019  1:00 PM       VASC Wound 05/02/19 left middle medial leg (Active)   Pre Size Length 0 5/9/2019  1:00 PM   Pre Size Width 0 5/9/2019  1:00 PM   Pre Size Depth 0 5/9/2019  1:00 PM   Pre Total Sq cm 0 5/9/2019  1:00 PM   Description healed 6/12/2019  1:00 PM       VASC Wound 05/29/19 Lt lateral shin superior (Active)   Pre Size Length 2 5/29/2019  1:00 PM   Pre Size Width 1.5 5/29/2019  1:00 PM   Pre Size Depth 0.1 5/29/2019  1:00 PM   Pre Total Sq cm 3 5/29/2019  1:00 PM   Description healed 6/12/2019  1:00 PM       VASC Wound 05/29/19 Lt lateral shin distal (Active)   Pre Size Length 1.3 5/29/2019  1:00 PM   Pre Size Width 1.2 5/29/2019  1:00 PM   Pre Size Depth 0.1 5/29/2019  1:00 PM   Pre Total Sq cm 1.56 5/29/2019  1:00 PM   Description healed 6/12/2019  1:00 PM       Wound 08/11/18 Non-pressure related ulcer Leg Right;Anterior (front) (Active)       Wound 01/04/19 Non-pressure related ulcer Leg Left (Active)       Wound 01/04/19 Non-pressure related ulcer  Leg Right (Active)       Incision 10/04/18 Leg Left (Active)      Undermining is not present.    The periwoundskin is pink      Plan:         1. Patient will follow up in 6 days         2. Treatment provided will include irrigation and dressings to promote autolytic debridement and will be as listed below     Cleansed with: Normal Saline    Protected skin with: Remedy Skin Repair    Dressings Applied: ABD pad and roll gauze    Compression Applied to the Left Leg: double layer tubular compression    Compression Applied to the Right Leg: double layer tubular compression    Offloading applied: None    Trial Products: no  Provider notified regarding concerns: no  Treatment Changes: no    Educational Barriers: none  Taught Regarding: Acitivity, Compliance, Compression and Dressing  Teaching Method: Exaplanation and DC sheet    Ulcers were scabbed over. Used ABD pad to protect them.     Gustabo Castorena LPN 06/12/19 [id number]

## 2021-05-30 ENCOUNTER — RECORDS - HEALTHEAST (OUTPATIENT)
Dept: ADMINISTRATIVE | Facility: CLINIC | Age: 76
End: 2021-05-30

## 2021-05-30 VITALS — BODY MASS INDEX: 29.4 KG/M2 | WEIGHT: 210 LBS | HEIGHT: 71 IN

## 2021-05-30 NOTE — PATIENT INSTRUCTIONS - HE
Leave dressing in place and return next week for nurse visit. Continue to wear your double tubigrip for compression.

## 2021-05-31 VITALS — BODY MASS INDEX: 29.4 KG/M2 | HEIGHT: 71 IN | WEIGHT: 210 LBS

## 2021-05-31 VITALS — WEIGHT: 190.3 LBS | BODY MASS INDEX: 26.54 KG/M2

## 2021-05-31 VITALS — BODY MASS INDEX: 26.6 KG/M2 | HEIGHT: 71 IN | WEIGHT: 190 LBS

## 2021-05-31 VITALS — WEIGHT: 193 LBS | BODY MASS INDEX: 26.92 KG/M2

## 2021-05-31 VITALS — WEIGHT: 187.2 LBS | BODY MASS INDEX: 26.11 KG/M2

## 2021-05-31 VITALS — BODY MASS INDEX: 25.44 KG/M2 | WEIGHT: 187.6 LBS

## 2021-05-31 VITALS — HEIGHT: 72 IN | BODY MASS INDEX: 26.95 KG/M2 | WEIGHT: 199 LBS

## 2021-05-31 VITALS — WEIGHT: 195 LBS | BODY MASS INDEX: 27.2 KG/M2

## 2021-05-31 NOTE — ANESTHESIA CARE TRANSFER NOTE
Last vitals:   Vitals:    08/18/19 1005   BP: 158/82   Pulse: (!) 112   Resp: 24   Temp: 36.8  C (98.3  F)   SpO2: 98%     Patient's level of consciousness is drowsy  Spontaneous respirations: yes  Maintains airway independently: yes  Dentition unchanged: yes  Oropharynx: oropharynx clear of all foreign objects    QCDR Measures:  ASA# 20 - Surgical Safety Checklist: WHO surgical safety checklist completed prior to induction    PQRS# 430 - Adult PONV Prevention: 4558F - Pt received => 2 anti-emetic agents (different classes) preop & intraop  ASA# 8 - Peds PONV Prevention: NA - Not pediatric patient, not GA or 2 or more risk factors NOT present  PQRS# 424 - Vida-op Temp Management: 4559F - At least one body temp DOCUMENTED => 35.5C or 95.9F within required timeframe  PQRS# 426 - PACU Transfer Protocol: - Transfer of care checklist used  ASA# 14 - Acute Post-op Pain: ASA14B - Patient did NOT experience pain >= 7 out of 10

## 2021-05-31 NOTE — ANESTHESIA PREPROCEDURE EVALUATION
Anesthesia Evaluation      Patient summary reviewed   No history of anesthetic complications     Airway   Mallampati: III  Neck ROM: full   Pulmonary - normal exam   (+) pneumonia,                          Cardiovascular - normal exam  Exercise tolerance: > or = 4 METS  (+) hypertension, past MI, CAD, CABG/stent, dysrhythmias, , hypercholesterolemia,     ECG reviewed        Neuro/Psych    (+) neuromuscular disease,      Endo/Other    (+) diabetes mellitus type 2 poorly controlled,      GI/Hepatic/Renal    (+)   chronic renal disease CRI,           Dental    (+) poor dentition                       Anesthesia Plan  Planned anesthetic: general endotracheal  50 mg ketamine IV on induction.    ASA 4 - emergent   Induction: intravenous   Anesthetic plan and risks discussed with: patient  Anesthesia plan special considerations: antiemetics,   Post-op plan: routine recovery

## 2021-05-31 NOTE — ANESTHESIA POSTPROCEDURE EVALUATION
Patient: Ever Crane  CHOLECYSTECTOMY, LAPAROSCOPIC  Anesthesia type: general    Patient location: PACU  Last vitals:   Vitals Value Taken Time   /68 8/18/2019 10:30 AM   Temp 36.8  C (98.3  F) 8/18/2019 10:05 AM   Pulse 117 8/18/2019 10:42 AM   Resp 16 8/18/2019 10:42 AM   SpO2 96 % 8/18/2019 10:42 AM   Vitals shown include unvalidated device data.  Post vital signs: stable  Level of consciousness: awake and responds to simple questions  Post-anesthesia pain: pain controlled  Post-anesthesia nausea and vomiting: no  Pulmonary: unassisted, face mask  Cardiovascular: stable and blood pressure at baseline  Hydration: adequate  Anesthetic events: no    QCDR Measures:  ASA# 11 - Vida-op Cardiac Arrest: ASA11B - Patient did NOT experience unanticipated cardiac arrest  ASA# 12 - Vida-op Mortality Rate: ASA12B - Patient did NOT die  ASA# 13 - PACU Re-Intubation Rate: ASA13B - Patient did NOT require a new airway mgmt  ASA# 10 - Composite Anes Safety: ASA10A - No serious adverse event    Additional Notes:

## 2021-06-01 VITALS — HEIGHT: 72 IN | BODY MASS INDEX: 26.28 KG/M2 | WEIGHT: 193 LBS | WEIGHT: 194 LBS | BODY MASS INDEX: 26.18 KG/M2

## 2021-06-01 VITALS — WEIGHT: 201 LBS | BODY MASS INDEX: 28.77 KG/M2 | HEIGHT: 70 IN

## 2021-06-01 VITALS — BODY MASS INDEX: 27.71 KG/M2 | WEIGHT: 204.3 LBS

## 2021-06-01 VITALS — BODY MASS INDEX: 28.14 KG/M2 | WEIGHT: 201 LBS | HEIGHT: 71 IN

## 2021-06-01 VITALS — HEIGHT: 71 IN | WEIGHT: 197.8 LBS | BODY MASS INDEX: 27.69 KG/M2

## 2021-06-01 VITALS — WEIGHT: 195 LBS | BODY MASS INDEX: 26.45 KG/M2

## 2021-06-02 ENCOUNTER — RECORDS - HEALTHEAST (OUTPATIENT)
Dept: ADMINISTRATIVE | Facility: CLINIC | Age: 76
End: 2021-06-02

## 2021-06-02 VITALS — BODY MASS INDEX: 27.51 KG/M2 | WEIGHT: 189 LBS

## 2021-06-02 VITALS — BODY MASS INDEX: 28.67 KG/M2 | WEIGHT: 197 LBS

## 2021-06-02 VITALS — HEIGHT: 72 IN | BODY MASS INDEX: 27.5 KG/M2 | WEIGHT: 203 LBS

## 2021-06-02 VITALS — BODY MASS INDEX: 28.24 KG/M2 | WEIGHT: 194 LBS

## 2021-06-02 VITALS — BODY MASS INDEX: 25.6 KG/M2 | HEIGHT: 72 IN | WEIGHT: 189 LBS

## 2021-06-02 VITALS
BODY MASS INDEX: 25.94 KG/M2 | WEIGHT: 191.5 LBS | HEIGHT: 72 IN | HEIGHT: 72 IN | HEIGHT: 72 IN | WEIGHT: 191.5 LBS | WEIGHT: 191.5 LBS | BODY MASS INDEX: 25.94 KG/M2 | BODY MASS INDEX: 25.94 KG/M2

## 2021-06-02 VITALS — HEIGHT: 72 IN | BODY MASS INDEX: 27.09 KG/M2 | WEIGHT: 200 LBS

## 2021-06-02 VITALS — WEIGHT: 199 LBS | BODY MASS INDEX: 26.95 KG/M2 | HEIGHT: 72 IN

## 2021-06-02 VITALS — WEIGHT: 203 LBS | BODY MASS INDEX: 29.55 KG/M2

## 2021-06-02 VITALS — HEIGHT: 72 IN | WEIGHT: 184.6 LBS | BODY MASS INDEX: 25 KG/M2

## 2021-06-02 VITALS — WEIGHT: 206 LBS | BODY MASS INDEX: 27.9 KG/M2 | HEIGHT: 72 IN

## 2021-06-02 VITALS — BODY MASS INDEX: 27.26 KG/M2 | WEIGHT: 201 LBS

## 2021-06-02 VITALS — BODY MASS INDEX: 27.12 KG/M2 | WEIGHT: 200 LBS

## 2021-06-02 VITALS — BODY MASS INDEX: 27.09 KG/M2 | HEIGHT: 72 IN | WEIGHT: 200 LBS

## 2021-06-02 VITALS — BODY MASS INDEX: 26.99 KG/M2 | WEIGHT: 199 LBS

## 2021-06-02 VITALS — WEIGHT: 200 LBS | HEIGHT: 72 IN | BODY MASS INDEX: 27.12 KG/M2 | WEIGHT: 198 LBS | BODY MASS INDEX: 26.82 KG/M2

## 2021-06-02 VITALS — WEIGHT: 203.3 LBS | BODY MASS INDEX: 27.54 KG/M2 | HEIGHT: 72 IN

## 2021-06-02 VITALS — BODY MASS INDEX: 31.88 KG/M2 | WEIGHT: 219 LBS

## 2021-06-02 VITALS — WEIGHT: 193 LBS | BODY MASS INDEX: 28.09 KG/M2

## 2021-06-02 VITALS — HEIGHT: 70 IN | WEIGHT: 194 LBS | BODY MASS INDEX: 27.77 KG/M2

## 2021-06-02 VITALS — BODY MASS INDEX: 28.38 KG/M2 | WEIGHT: 195 LBS

## 2021-06-02 VITALS — BODY MASS INDEX: 26.85 KG/M2 | WEIGHT: 198 LBS

## 2021-06-02 VITALS — BODY MASS INDEX: 26.58 KG/M2 | WEIGHT: 196 LBS

## 2021-06-03 VITALS — BODY MASS INDEX: 26.93 KG/M2 | HEIGHT: 72 IN | WEIGHT: 198.8 LBS

## 2021-06-03 VITALS — WEIGHT: 209 LBS | HEIGHT: 72 IN | BODY MASS INDEX: 28.31 KG/M2

## 2021-06-03 VITALS — WEIGHT: 209.3 LBS | BODY MASS INDEX: 28.39 KG/M2

## 2021-06-03 VITALS — WEIGHT: 203.4 LBS | BODY MASS INDEX: 27.59 KG/M2

## 2021-06-05 VITALS
WEIGHT: 200 LBS | DIASTOLIC BLOOD PRESSURE: 70 MMHG | HEIGHT: 72 IN | HEART RATE: 95 BPM | SYSTOLIC BLOOD PRESSURE: 124 MMHG | BODY MASS INDEX: 27.09 KG/M2

## 2021-06-08 NOTE — PROGRESS NOTES
Date of Service:2/2/2017    Chief Complaint:   Chief Complaint   Patient presents with     Wound Check       History:    Ever presents to clinic with his sister regarding his lower legs.  He does not have any open areas.  He is not consistently wearing his velcro compression wraps.  His itchy skin has improved with the TMC, but he is not using Sarna or Aquaphor as previously discussed. His legs have greatly improved.  He is not taking all of his medications regularly. He is states multiple different reasons why he doesn't always wear his velcro compression wraps or take his medications.  He has a follow up with his primary provider in the next 1-2 weeks.    Current Outpatient Prescriptions   Medication Sig Dispense Refill     furosemide (LASIX) 20 MG tablet Take 20 mg by mouth daily.       glipiZIDE (GLUCOTROL) 5 MG tablet Take 5 mg by mouth daily.       lisinopril (PRINIVIL,ZESTRIL) 40 MG tablet Take 40 mg by mouth daily.       metFORMIN (GLUCOPHAGE) 500 MG tablet Take 500 mg by mouth 2 (two) times a day with meals. Take one tablet by mouth every morning and 2 tablets by mouth every night at bedtime.       metoprolol succinate (TOPROL XL) 50 MG 24 hr tablet Take 1 tablet (50 mg total) by mouth daily. 90 tablet 3     omeprazole (PRILOSEC) 20 MG capsule Take 20 mg by mouth daily.       simvastatin (ZOCOR) 40 MG tablet Take 40 mg by mouth bedtime.       sotalol (BETAPACE) 80 MG tablet Take 80 mg by mouth 2 (two) times a day.       triamcinolone (KENALOG) 0.1 % cream Apply to your lower legs daily.  Do not remove your bandages. 80 g 1     warfarin (COUMADIN) 5 MG tablet Take 5 mg by mouth daily. Adjust dose based on INR results as directed.       Current Facility-Administered Medications   Medication Dose Route Frequency Provider Last Rate Last Dose     lidocaine 2 % jelly (XYLOCAINE)   Topical PRN Miriam Quinones, CNP   1 application at 02/02/17 1421       Allergies   Allergen Reactions     Latex      Penicillins         Physical Exam:    Vitals:    02/02/17 1300   BP: 138/62   Pulse: 80   Resp: 16   Temp: 98.6  F (37  C)    There is no height or weight on file to calculate BMI.    General:  71 y.o. male in no apparent distress.    Psychiatric:  Alert and oriented x 3.  Cooperative.   Integumentary:  Skin is uniformly warm, dry and pink.  Lower extremity edema: none  Ulcerations:  None     Vasc Edema 1/3/2017 1/10/2017 1/17/2017 1/19/2017 1/24/2017   Right just above MTP - - 29.7 24.0 23   Right Ankle - - 24.2 26.5 25   Right Widest Calf - - 42.6 37.0 41   Right Thigh Up 10cm - - 46 - -   Left - just above MTP 21 22.7 23 24.5 24   Left Ankle 25 23.5 24 22.5 23   Left Widest Calf 35 34.3 39.3 36.5 39   Left Thigh Up 10cm 43 - 46 - -       Wound 10/25/16  L lateral inferior calf (Active)   Pre Size Length 0.5 2/2/2017  2:00 PM   Pre Size Width 0.5 2/2/2017  2:00 PM   Pre Total Sq cm 0.03 2/2/2017  2:00 PM   Undermined na 10/25/2016  2:00 PM   Tunneling na 10/25/2016  2:00 PM   Description red wound bed 10/25/2016  2:00 PM   Product Used ABD Pad 1/19/2017 10:00 AM       Wound 01/10/17 left shin (Active)   Pre Size Length 0.3 2/2/2017  2:00 PM   Pre Size Width 0.5 2/2/2017  2:00 PM   Pre Total Sq cm 0.02 2/2/2017  2:00 PM   Undermined N 1/17/2017  9:00 AM   Tunneling N 1/17/2017  9:00 AM   Product Used ABD Pad 1/19/2017 10:00 AM       Wound 02/02/17 left lateral foot (Active)   Pre Size Length 0.2 2/2/2017  2:00 PM   Pre Size Width 2 2/2/2017  2:00 PM   Pre Total Sq cm 0.4 2/2/2017  2:00 PM       Assessment:  1. Venous hypertension, chronic, bilateral     2. Polyneuropathy associated with underlying disease     3. Dermatitis     4. Idiopathic peripheral neuropathy         A new wound was identified today: no.    Plan:   1.  Dermatitis, greatly improved.    2.   Venous insufficiency, stable.  Discussed the importance of wearing compression to keep the swelling down and prevent breakdown    3.  Pruritis.  Improved    4.  Treatment:    TMC will be discontinued.  He will use Sarna for itching and Aquaphor for his dermatitis.  He also agrees to wear his velcro compression wraps.    5.   Patient will follow up with me in 3 weeks  for reevaluation.      KEVIN Ness, CNP,  [facility]  [phone]

## 2021-06-08 NOTE — PROGRESS NOTES
Date of Service:1/3/2017    Chief Complaint:   Chief Complaint   Patient presents with     Wound Check       History:    Ever presents to clinic with his sister for reevaluation of his lower leg.  He left the 2-layer wrap on his left leg for 2 weeks because he failed his last appointment.  He was instructed on Friday to remove the 2-layer wrap and apply a Tubigrip.  On Friday when he removed the 2-layer wrap his leg looked good.  Today, it does not look as good and he is complaining of being itchy.      Current Outpatient Prescriptions   Medication Sig Dispense Refill     furosemide (LASIX) 20 MG tablet Take 20 mg by mouth daily.       glipiZIDE (GLUCOTROL) 5 MG tablet Take 5 mg by mouth daily.       lisinopril (PRINIVIL,ZESTRIL) 40 MG tablet Take 40 mg by mouth daily.       metFORMIN (GLUCOPHAGE) 500 MG tablet Take 500 mg by mouth 2 (two) times a day with meals. Take one tablet by mouth every morning and 2 tablets by mouth every night at bedtime.       metoprolol succinate (TOPROL XL) 50 MG 24 hr tablet Take 1 tablet (50 mg total) by mouth daily. 90 tablet 3     omeprazole (PRILOSEC) 20 MG capsule Take 20 mg by mouth daily.       simvastatin (ZOCOR) 40 MG tablet Take 40 mg by mouth bedtime.       sotalol (BETAPACE) 80 MG tablet Take 80 mg by mouth 2 (two) times a day.       warfarin (COUMADIN) 5 MG tablet Take 5 mg by mouth daily. Adjust dose based on INR results as directed.       Current Facility-Administered Medications   Medication Dose Route Frequency Provider Last Rate Last Dose     lidocaine 2 % jelly (XYLOCAINE)   Topical PRN Miriam Quinones, CNP   1 application at 12/16/16 1045       Allergies   Allergen Reactions     Penicillins        Physical Exam:    Vitals:    01/03/17 0700   BP: 118/80   Pulse: 76   Resp: 18   Temp: 98  F (36.7  C)    There is no height or weight on file to calculate BMI.    General:  71 y.o. male in no apparent distress.    Psychiatric:  Alert and oriented x 3.  Cooperative.    Integumentary:  Skin is uniformly warm, dry and pink.  Lower extremity edema: +3, no compression on today.  Ulcerations:  There is no erika wound erythema, induration, maceration, denudement, fluctuance or warmth surrounding the ulcer(s).     Vasc Edema 11/8/2016 11/15/2016 12/8/2016 12/16/2016 1/3/2017   Right just above MTP - - - - -   Right Ankle - - - - -   Right Widest Calf - - - - -   Left - just above MTP 24.7 24.3 22 24.1 21   Left Ankle 24.7 22.4 22 22 25   Left Widest Calf 37.5 33.9 33 33 35   Left Thigh Up 10cm 46.5 43.3 43 44.5 43       Wound 10/25/16  L lateral inferior calf (Active)   Pre Size Length 0.3 1/3/2017  8:00 AM   Pre Size Width 0.6 12/16/2016 10:00 AM   Pre Total Sq cm 0.3 12/16/2016 10:00 AM   Undermined na 10/25/2016  2:00 PM   Tunneling na 10/25/2016  2:00 PM   Description red wound bed 10/25/2016  2:00 PM       Wound 10/25/16 L lateral Superior calf (Active)   Pre Size Length 0.5 1/3/2017  8:00 AM   Pre Size Width 0.5 1/3/2017  8:00 AM   Pre Total Sq cm 3.45 12/16/2016 10:00 AM   Undermined na 10/25/2016  2:00 PM   Tunneling na 10/25/2016  2:00 PM   Description red wound bed 10/25/2016  2:00 PM       Wound 12/16/16 right lateral calf medial to superior wound (Active)   Pre Size Length 0.3 1/3/2017  8:00 AM   Pre Size Width 0.3 1/3/2017  8:00 AM   Pre Total Sq cm 0.25 12/16/2016 10:00 AM       Assessment:  1. Leg ulcer, left, limited to breakdown of skin  Compression stockings   2. Idiopathic peripheral neuropathy     3. Dermatitis     4. Venous hypertension, chronic, bilateral  Compression stockings       A new wound was identified today: no.    Plan:  1.   Debridement of the left leg ulcer was recommended.  After consent was obtained and topical 2% Xylocaine was applied under clean conditions, and using a #15 blade,the devitalized dermal tissue was debrided for a total square centimeters of 4.0. Following debridement, there was a decrease in the nonviable tissue. The patient tolerated  the procedure without any difficulty.     2.    Left leg with healing complicated by Venous insufficiency, ulcers are improving.    3.  Dermatitis, suspect he is allergic to his Tubigrip.       4.   Treatment:   For his right leg, he will wear his velcro compression wraps and for the left leg, foam nonadhesive will be applied and a 2-layer wrap for compression. A prescription for velcro compression wrap was written.    5.   Patient will follow up with me in one week  for reevaluation.      Miriam Quinones, APRN, CNP,  [facility]  [phone]

## 2021-06-08 NOTE — PROGRESS NOTES
Date of Service:1/17/2017    Chief Complaint:   Chief Complaint   Patient presents with     Follow-up       History:    Ever presents to clinic with his sister for reevaluation of left leg ulcer.  He pushed to the 2-layer wrap on his left leg because it was itching.  As a result he developed swelling above his 2-layer wrap.  His right leg is also very swollen.  He has been eating foods with a higher salt content.  He has not been taking his pills for the last month because he did not have money to purchase them.  His sister will assist him in getting his pills tomorrow because he receivers his social security check.  Also, he has not purchased sarna to try for his itching skin.     Current Outpatient Prescriptions   Medication Sig Dispense Refill     warfarin (COUMADIN) 5 MG tablet Take 5 mg by mouth daily. Adjust dose based on INR results as directed.       furosemide (LASIX) 20 MG tablet Take 20 mg by mouth daily.       glipiZIDE (GLUCOTROL) 5 MG tablet Take 5 mg by mouth daily.       lisinopril (PRINIVIL,ZESTRIL) 40 MG tablet Take 40 mg by mouth daily.       metFORMIN (GLUCOPHAGE) 500 MG tablet Take 500 mg by mouth 2 (two) times a day with meals. Take one tablet by mouth every morning and 2 tablets by mouth every night at bedtime.       metoprolol succinate (TOPROL XL) 50 MG 24 hr tablet Take 1 tablet (50 mg total) by mouth daily. 90 tablet 3     omeprazole (PRILOSEC) 20 MG capsule Take 20 mg by mouth daily.       simvastatin (ZOCOR) 40 MG tablet Take 40 mg by mouth bedtime.       sotalol (BETAPACE) 80 MG tablet Take 80 mg by mouth 2 (two) times a day.       Current Facility-Administered Medications   Medication Dose Route Frequency Provider Last Rate Last Dose     lidocaine 2 % jelly (XYLOCAINE)   Topical PRN Miriam Quinones CNP           Allergies   Allergen Reactions     Latex      Penicillins        Physical Exam:    Vitals:    01/17/17 0937   BP: 130/66   Pulse: 66   Resp: 18   Temp: 97.7  F (36.5  C)     There is no height or weight on file to calculate BMI.    General:  71 y.o. male in no apparent distress.    Psychiatric:  Alert and oriented x 3.  Cooperative.   Integumentary:  Skin is uniformly warm, dry and pink.  Lower extremity edema: +4 R>L  Ulcerations:  There is no erika wound erythema, induration, maceration, denudement, fluctuance or warmth surrounding the ulcer(s).     Vasc Edema 12/8/2016 12/16/2016 1/3/2017 1/10/2017 1/17/2017   Right just above MTP - - - - 29.7   Right Ankle - - - - 24.2   Right Widest Calf - - - - 42.6   Right Thigh Up 10cm - - - - 46   Left - just above MTP 22 24.1 21 22.7 23   Left Ankle 22 22 25 23.5 24   Left Widest Calf 33 33 35 34.3 39.3   Left Thigh Up 10cm 43 44.5 43 - 46           Wound 01/10/17 left shin (Active)   Pre Size Length 0.5 1/17/2017  9:00 AM   Pre Size Width 0.5 1/17/2017  9:00 AM   Pre Total Sq cm 25 1/17/2017  9:00 AM   Undermined N 1/17/2017  9:00 AM   Tunneling N 1/17/2017  9:00 AM   Prodcut Used Foam 1/17/2017  9:00 AM       Assessment:  1. Leg ulcer, left, limited to breakdown of skin     2. Polyneuropathy associated with underlying disease     3. Venous hypertension, chronic, bilateral     4. Dermatitis     5. Venous ulcer of ankle, left     6. Localized edema         A new wound was identified today: .    Plan:  1.   Debridement of the left leg ulcer was recommended.  After consent was obtained and topical 2% Xylocaine was applied under clean conditions, and using a #15 blade,the devitalized dermal tissue was debrided for a total square centimeters of 0.25. Following debridement, there was a decrease in the nonviable tissue. The patient tolerated the procedure without any difficulty.      2.    Left leg ulcer with healing complicated by Venous insufficiency and peripheral neuropathy, diet, and lack of compliance with his medication.      3.  Leg swelling.  Discussed the importance of taking his medications.  His sister will help him obtain them  tomorrow.      4.  Dermatitis, persistent.  He has not purchased sarna.  Encouraged him to purchase it so he can try it.  Contact dermatitis is possibly a contributor.    5.   Treatment:  In clinic, we applied adaptic and an ABD over open areas.  A 4-layer wrap was applied secondary to possible allergy to his 2-layer wrap.     6.   Patient will follow up with me in one week  for reevaluation.      Miriam Quinones, APRN, CNP,  [facility]  [phone]

## 2021-06-08 NOTE — PROGRESS NOTES
Date of Service:1/24/2017    Chief Complaint:   Chief Complaint   Patient presents with     Wound Check       History:    Ever presents to clinic for reevaluation of his left lower leg.  His legs were itching so he pushed his 2-layer wrap down to his ankles so he could scratch.  He did not  any OTC steroid cream.  He picked up some of his medications, but did not  his diabetic medication secondary to cost.  He is not taking the medication that he did .     Current Outpatient Prescriptions   Medication Sig Dispense Refill     furosemide (LASIX) 20 MG tablet Take 20 mg by mouth daily.       glipiZIDE (GLUCOTROL) 5 MG tablet Take 5 mg by mouth daily.       lisinopril (PRINIVIL,ZESTRIL) 40 MG tablet Take 40 mg by mouth daily.       metFORMIN (GLUCOPHAGE) 500 MG tablet Take 500 mg by mouth 2 (two) times a day with meals. Take one tablet by mouth every morning and 2 tablets by mouth every night at bedtime.       metoprolol succinate (TOPROL XL) 50 MG 24 hr tablet Take 1 tablet (50 mg total) by mouth daily. 90 tablet 3     omeprazole (PRILOSEC) 20 MG capsule Take 20 mg by mouth daily.       simvastatin (ZOCOR) 40 MG tablet Take 40 mg by mouth bedtime.       sotalol (BETAPACE) 80 MG tablet Take 80 mg by mouth 2 (two) times a day.       triamcinolone (KENALOG) 0.1 % cream Apply to your lower legs daily.  Do not remove your bandages. 80 g 1     warfarin (COUMADIN) 5 MG tablet Take 5 mg by mouth daily. Adjust dose based on INR results as directed.       Current Facility-Administered Medications   Medication Dose Route Frequency Provider Last Rate Last Dose     lidocaine 2 % jelly (XYLOCAINE)   Topical PRN Miriam Quinones, CNP   1 application at 01/24/17 1006       Allergies   Allergen Reactions     Latex      Penicillins        Physical Exam:    Vitals:    01/24/17 0900   BP: 132/70   Pulse: 72   Resp: 16   Temp: 98.2  F (36.8  C)    There is no height or weight on file to calculate BMI.    General:   71 y.o. male in no apparent distress.    Psychiatric:  Alert and oriented x 3.  Cooperative.   Integumentary:  Skin is uniformly warm, dry and pink.  Lower extremity edema: +2-3 in area on above the 2-layer wrap   Ulcerations:  There is no erika wound erythema, induration, maceration, denudement, fluctuance or warmth surrounding the ulcer(s).       Wound 10/25/16  L lateral inferior calf (Active)   Pre Size Length 1 1/24/2017 10:00 AM   Pre Size Width 1.5 1/24/2017 10:00 AM   Pre Total Sq cm 1.5 1/24/2017 10:00 AM   Undermined na 10/25/2016  2:00 PM   Tunneling na 10/25/2016  2:00 PM   Description red wound bed 10/25/2016  2:00 PM   Prodcut Used ABD Pad 1/19/2017 10:00 AM       Wound 01/10/17 left shin (Active)   Pre Size Length 0.5 1/24/2017 10:00 AM   Pre Size Width 1 1/24/2017 10:00 AM   Pre Total Sq cm 0.5 1/24/2017 10:00 AM   Undermined N 1/17/2017  9:00 AM   Tunneling N 1/17/2017  9:00 AM   Prodcut Used ABD Pad 1/19/2017 10:00 AM       Wound 01/24/17 right calf (Active)   Pre Size Length 1 1/24/2017 10:00 AM   Pre Size Width 0.5 1/24/2017 10:00 AM   Pre Total Sq cm 0.5 1/24/2017 10:00 AM         Assessment:  1. Venous hypertension, chronic, bilateral  Compression stockings   2. Leg ulcer, left, limited to breakdown of skin     3. Polyneuropathy associated with underlying disease     4. Dermatitis  triamcinolone (KENALOG) 0.1 % cream   5. Venous ulcer of ankle, left     6. Skin ulcer of right lower leg, limited to breakdown of skin         A new wound was identified today: yes,  it is located right leg.    Plan:  1.   Debridement of the right and left leg ulcer was recommended.  After consent was obtained and topical 2% Xylocaine was applied under clean conditions, and using a #15 blade,the devitalized dermal tissue was debrided for a total square centimeters of 2.5. Following debridement, there was a decrease in the nonviable tissue. The patient tolerated the procedure without any difficulty.     2.     Dermatitis, worse.    3.  Bilateral Venous insufficiency ulcers.  No signs of infection.    4.  Medication compliance, discussed the importance of taking his medication and potential consequences.  He agreed to start taking his medication as prescribed.  I will notify his primary regarding this concern.    4.   Treatment:   Mepilex border will be applied to his ulcers.  He will obtain a velcro compression wraps for his right leg.  A prescription was written.  This will be dispensed today.  He will apply TMC cream to the areas around his bandages daily and wear his stockinet and  velcro compression wraps daily    5.   Patient will follow up with me in one week  for reevaluation.      Miriam Quinones, APRN, CNP,  [facility]  [phone]

## 2021-06-09 NOTE — PROGRESS NOTES
Date of Service:3/24/2017    Chief Complaint:   Chief Complaint   Patient presents with     Follow-up       History:    Ever presents to clinic for reevaluation of his leg ulcers.  Two nights ago he started scratching his left leg, which results in excoriations that he could not get to stop bleeding.  He went to the ED and part of the wrap was removed to help stop the bleeding.  Today, he is not having any itching.  He is wearing his compression wrap on his right leg.    Current Outpatient Prescriptions   Medication Sig Dispense Refill     diphenhydrAMINE (BENADRYL) 25 mg tablet Take 1 tablet (25 mg total) by mouth every 6 (six) hours as needed for itching. 30 tablet 0     furosemide (LASIX) 20 MG tablet Take 20 mg by mouth daily.       glipiZIDE (GLUCOTROL) 5 MG tablet Take 5 mg by mouth daily.       hydrocortisone 1 % cream        lisinopril (PRINIVIL,ZESTRIL) 40 MG tablet Take 40 mg by mouth daily.       metFORMIN (GLUCOPHAGE) 500 MG tablet Take 500 mg by mouth 2 (two) times a day with meals. Take one tablet by mouth every morning and 2 tablets by mouth every night at bedtime.       metoprolol succinate (TOPROL XL) 50 MG 24 hr tablet Take 1 tablet (50 mg total) by mouth daily. 90 tablet 3     omeprazole (PRILOSEC) 20 MG capsule Take 20 mg by mouth daily.       predniSONE (DELTASONE) 10 MG tablet Take 10 mg by mouth 4 (four) times a day.       simvastatin (ZOCOR) 40 MG tablet Take 40 mg by mouth bedtime.       sotalol (BETAPACE) 80 MG tablet Take 80 mg by mouth 2 (two) times a day.       triamcinolone (KENALOG) 0.1 % cream Apply to your lower legs daily for two weeks 80 g 1     triamcinolone (KENALOG) 0.1 % ointment        WAL-DRYL ALLERGY 25 mg capsule        warfarin (COUMADIN) 5 MG tablet Take 5 mg by mouth daily. Adjust dose based on INR results as directed.       nitroglycerin (NITROSTAT) 0.4 MG SL tablet        Current Facility-Administered Medications   Medication Dose Route Frequency Provider Last Rate  Last Dose     lidocaine 2 % jelly (XYLOCAINE)   Topical PRN Miriam Johnson Stacie, CNP           Allergies   Allergen Reactions     Latex      Penicillins        Physical Exam:    Vitals:    03/24/17 1017   BP: 120/64   Pulse: 68   Resp: 18   Temp: 98.3  F (36.8  C)    There is no height or weight on file to calculate BMI.    General:  71 y.o. male in no apparent distress.    Psychiatric:  Alert and oriented x 3.  Cooperative.   Integumentary:  Skin is uniformly warm, dry and pink.  Lower extremity edema: minimal  Ulcerations:  There is no erika wound erythema, induration, maceration, denudement, fluctuance or warmth surrounding the excoriations.    Labs:    No results found for: SEDRATE  Lab Results   Component Value Date    CRP 2.4 (H) 02/14/2017     No components found for: CREATINE  Lab Results   Component Value Date    BUN 27 02/14/2017     Lab Results   Component Value Date    HGBA1C 6.1 10/25/2013     Lab Results   Component Value Date    ALBUMIN 4.3 04/26/2016       Vasc Edema 1/19/2017 1/24/2017 2/22/2017 3/1/2017 3/17/2017   Right just above MTP 24.0 23 22 23 23.4   Right Ankle 26.5 25 24.5 24 24   Right Widest Calf 37.0 41 34 36 35.8   Right Thigh Up 10cm - - - - -   Left - just above MTP 24.5 24 22 22 23.5   Left Ankle 22.5 23 21 20.5 24   Left Widest Calf 36.5 39 33 32 33.2   Left Thigh Up 10cm - - - - -           Wound 03/17/17 L ANTERIOR LEG (Active)   Pre Size Length 0 3/24/2017 10:00 AM   Pre Size Width 0 3/24/2017 10:00 AM   Pre Total Sq cm 0 3/24/2017 10:00 AM   Description multiple excoriations 3/24/2017 10:00 AM       Assessment:  1. Leg ulcer, left, limited to breakdown of skin     2. Venous hypertension, chronic, bilateral     3. Dermatitis     4. Diabetes         A new wound was identified today: yes,  it is located left leg.    Plan:  1.  No debridement completed.    2.  Left leg skin breakdown secondary to scratching.  Healing complicated by Venous insufficiency.  There are no signs of  infection.     3.  Treatment:   Calamine wraps will be applied over his excoriations and covered with an ABD.  A 2-layer wrap will then be applied for compression.  He will continue to wear his compression velcro wrap on his right leg.    4.   Patient will follow up with me in one week  for reevaluation.      KEVIN Ness, CNP,  [facility] [place]  [phone]

## 2021-06-09 NOTE — PROGRESS NOTES
Date of Service:2/22/2017    Chief Complaint:   Chief Complaint   Patient presents with     Follow-up       History:    Ever presents to clinic for reevaluation of his lower legs.  He was into the ED last week for cellulitis.  He was started on clindamycin and prednisone.  After counting his pills, he is clearly not taken them as prescribed.  At every visit he admits to not taking all of his medication and agrees to start taking all of them, but does not.  Evidently he has missed 15 doses of medication in the past week.  He denies any weeping from his legs and was unaware of a new ulcer on his foot.  He is refusing any home care services.    Current Outpatient Prescriptions   Medication Sig Dispense Refill     clindamycin (CLEOCIN) 300 MG capsule Take 1 capsule (300 mg total) by mouth 3 (three) times a day for 10 days. 30 capsule 0     diphenhydrAMINE (BENADRYL) 25 mg tablet Take 1 tablet (25 mg total) by mouth every 6 (six) hours as needed for itching. 30 tablet 0     furosemide (LASIX) 20 MG tablet Take 20 mg by mouth daily.       glipiZIDE (GLUCOTROL) 5 MG tablet Take 5 mg by mouth daily.       hydrocortisone 1 % cream        lisinopril (PRINIVIL,ZESTRIL) 40 MG tablet Take 40 mg by mouth daily.       metFORMIN (GLUCOPHAGE) 500 MG tablet Take 500 mg by mouth 2 (two) times a day with meals. Take one tablet by mouth every morning and 2 tablets by mouth every night at bedtime.       metoprolol succinate (TOPROL XL) 50 MG 24 hr tablet Take 1 tablet (50 mg total) by mouth daily. 90 tablet 3     omeprazole (PRILOSEC) 20 MG capsule Take 20 mg by mouth daily.       predniSONE (DELTASONE) 10 MG tablet Take 10 mg by mouth 4 (four) times a day.       simvastatin (ZOCOR) 40 MG tablet Take 40 mg by mouth bedtime.       sotalol (BETAPACE) 80 MG tablet Take 80 mg by mouth 2 (two) times a day.       triamcinolone (KENALOG) 0.1 % cream Apply to your lower legs daily.  Do not remove your bandages. 80 g 1     WAL-DRYL ALLERGY 25 mg  capsule        warfarin (COUMADIN) 5 MG tablet Take 5 mg by mouth daily. Adjust dose based on INR results as directed.       Current Facility-Administered Medications   Medication Dose Route Frequency Provider Last Rate Last Dose     lidocaine 2 % jelly (XYLOCAINE)   Topical PRN Miriam Quinones CNP   1 application at 02/22/17 1024       Allergies   Allergen Reactions     Latex      Penicillins        Physical Exam:    Vitals:    02/22/17 0900   BP: 132/82   Pulse: 72   Resp: 16   Temp: 98.4  F (36.9  C)    There is no height or weight on file to calculate BMI.    General:  71 y.o. male in no apparent distress.    Psychiatric:  Alert and oriented x 3.  Cooperative.   Integumentary:  Skin is uniformly warm, dry and pink.  Lower extremity edema: minimal  Ulcerations:  There is no erika wound erythema, induration, maceration, denudement, fluctuance or warmth surrounding the ulcer(s).     Vasc Edema 1/10/2017 1/17/2017 1/19/2017 1/24/2017 2/22/2017   Right just above MTP - 29.7 24.0 23 22   Right Ankle - 24.2 26.5 25 24.5   Right Widest Calf - 42.6 37.0 41 34   Right Thigh Up 10cm - 46 - - -   Left - just above MTP 22.7 23 24.5 24 22   Left Ankle 23.5 24 22.5 23 21   Left Widest Calf 34.3 39.3 36.5 39 33   Left Thigh Up 10cm - 46 - - -           Wound 02/22/17 left medial foot (Active)   Pre Size Length 0.3 2/22/2017 10:00 AM   Pre Size Width 1.1 2/22/2017 10:00 AM   Pre Total Sq cm 0.33 2/22/2017 10:00 AM       Assessment:  1. Venous hypertension, chronic, bilateral     2. Polyneuropathy associated with underlying disease     3. Dermatitis     4. Cellulitis     5. Idiopathic peripheral neuropathy     6. Diabetic foot ulcer     7. Non compliance w medication regimen         A new wound was identified today: yes,  it is located left foot.    Plan:  1.   Excisional Debridement of the left foot ulcer was recommended and after consent was obtained and topical 2% Xylocaine was applied, under clean conditions, using a #15  scalpel, the devitalized subcutaneous  tissue in the ulcer base and at the ulcer margins were sharply excised for a total sq. cm of .33    Following excision in the nonviable tissue.  The patient tolerated the procedure without difficulty.  There was minimal bleeding tissue, which was easily controlled and a d    2.    Left foot ulcer with healing complicated by peripheral neuropathy, diabetes, uncontrolled and medication non compliance.  There are no signs of infection.    3.  Cellulitis, resolving, but he is not taking his medication as prescribed.  Emphasized the importance of taking them as prescribed.    4.   Non compliance with medication regimen.  He is refusing any assistance with his medications.       5.   Treatment:   Aquacel foam adhesive will be applied to the ulcer and a 2-layer wrap will be applied for compression.    6.   Patient will follow up with me in one week  for reevaluation.      Miriam Quinones, APRN, CNP,  [facility]  [phone]

## 2021-06-09 NOTE — PROGRESS NOTES
Date of Service:3/17/2017    Chief Complaint:   Chief Complaint   Patient presents with     Follow-up       History:    Ever presents to clinic for reevaluation of his foot ulcer.  He failed his appointment 2 days ago to have his 2-layer wrap change.  He is complaining that the wrap rubbed his shin and caused an open area.  He is not having any pain in his ankle ulcer.  He saw dermatology and started him a cream that is helping his rash.  He is also followed up with his primary provider and reports that he is now taking his medication.    Current Outpatient Prescriptions   Medication Sig Dispense Refill     diphenhydrAMINE (BENADRYL) 25 mg tablet Take 1 tablet (25 mg total) by mouth every 6 (six) hours as needed for itching. 30 tablet 0     furosemide (LASIX) 20 MG tablet Take 20 mg by mouth daily.       glipiZIDE (GLUCOTROL) 5 MG tablet Take 5 mg by mouth daily.       hydrocortisone 1 % cream        lisinopril (PRINIVIL,ZESTRIL) 40 MG tablet Take 40 mg by mouth daily.       metFORMIN (GLUCOPHAGE) 500 MG tablet Take 500 mg by mouth 2 (two) times a day with meals. Take one tablet by mouth every morning and 2 tablets by mouth every night at bedtime.       metoprolol succinate (TOPROL XL) 50 MG 24 hr tablet Take 1 tablet (50 mg total) by mouth daily. 90 tablet 3     omeprazole (PRILOSEC) 20 MG capsule Take 20 mg by mouth daily.       predniSONE (DELTASONE) 10 MG tablet Take 10 mg by mouth 4 (four) times a day.       simvastatin (ZOCOR) 40 MG tablet Take 40 mg by mouth bedtime.       sotalol (BETAPACE) 80 MG tablet Take 80 mg by mouth 2 (two) times a day.       triamcinolone (KENALOG) 0.1 % cream Apply to your lower legs daily for two weeks 80 g 1     WAL-DRYL ALLERGY 25 mg capsule        warfarin (COUMADIN) 5 MG tablet Take 5 mg by mouth daily. Adjust dose based on INR results as directed.       Current Facility-Administered Medications   Medication Dose Route Frequency Provider Last Rate Last Dose     lidocaine 2 %  jelly (XYLOCAINE)   Topical PRN Miriam Johnson MarkGardenia, CNP   1 application at 03/01/17 1029       Allergies   Allergen Reactions     Latex      Penicillins        Physical Exam:    Vitals:    03/17/17 0948   BP: 132/66   Pulse: 68   Resp: 18   Temp: 97.5  F (36.4  C)    There is no height or weight on file to calculate BMI.    General:  71 y.o. male in no apparent distress.    Psychiatric:  Alert and oriented x 3.  Cooperative.   Integumentary:  Skin is uniformly warm, dry and pink.  Lower extremity edema: none  Ulcerations:  There is no erika wound erythema, induration, maceration, denudement, fluctuance or warmth surrounding both ulcer(s).    Labs:    No results found for: SEDRATE  Lab Results   Component Value Date    CRP 2.4 (H) 02/14/2017     No components found for: CREATINE  Lab Results   Component Value Date    BUN 27 02/14/2017     Lab Results   Component Value Date    HGBA1C 6.1 10/25/2013     Lab Results   Component Value Date    ALBUMIN 4.3 04/26/2016       Vasc Edema 1/19/2017 1/24/2017 2/22/2017 3/1/2017 3/17/2017   Right just above MTP 24.0 23 22 23 23.4   Right Ankle 26.5 25 24.5 24 24   Right Widest Calf 37.0 41 34 36 35.8   Right Thigh Up 10cm - - - - -   Left - just above MTP 24.5 24 22 22 23.5   Left Ankle 22.5 23 21 20.5 24   Left Widest Calf 36.5 39 33 32 33.2   Left Thigh Up 10cm - - - - -       )  Wound 02/22/17 left medial foot (Active)   Pre Size Length 0.1 3/17/2017  9:00 AM   Pre Size Width 0.2 3/17/2017  9:00 AM   Pre Total Sq cm 0.2 3/17/2017  9:00 AM   Post Size Length 0.2 3/1/2017 10:00 AM   Post Size Width 0.8 3/1/2017 10:00 AM       Wound 03/17/17 L ANTERIOR LEG (Active)   Pre Size Length 0.5 3/17/2017  9:00 AM   Pre Size Width 2.5 3/17/2017  9:00 AM   Pre Total Sq cm 1.25 3/17/2017  9:00 AM       Assessment:  1. Leg ulcer, left, limited to breakdown of skin     2. Venous hypertension, chronic, bilateral     3. Dermatitis     4. Idiopathic peripheral neuropathy     5. Diabetes     6.  Non compliance w medication regimen         A new wound was identified today: yes,  it is located left leg.    Plan:  1.   Debridement of the foot and leg ulcer was recommended.  After consent was obtained and topical 2% Xylocaine was applied under clean conditions, and using a #15 blade,the devitalized dermal tissue was debrided for a total square centimeters of 1.45. Following debridement, there was a decrease in the nonviable tissue. The patient tolerated the procedure without any difficulty.     2.   Left leg ulcer, secondary to his 2-layer wrap.  Healing is complicated by dermatitis, peripheral neuropathy, diabetes and Venous insufficiency.  No signs of infection     3.   Foot ulcer, is complicated by dermatitis, peripheral neuropathy, diabetes and Venous insufficiency,   improving.      4.   Dermatitis, improved    5.  Non compliance with medication.  He reports that he is taking them as prescribed.  He had stopped because he thought that he did not feel as well when he took them.  He is to follow up with his primary monthly for a while.    6.   Treatment:   For the leg ulcer and the foot ulcer, layers of viscopaste will be applied to both ulcers and covered with an ABD.  Padding will be placed on his shin and a 2-layer wrap applied for compression.    7.   Patient will follow up with me in one week  for reevaluation.      Miriam Quinones, APRN, CNP,  [facility]  [phone]

## 2021-06-09 NOTE — PROGRESS NOTES
Nurse Visit      Date of Service:3/2/2017    Chief Complaint: Patient presents to clinic for evaluation of their ulcer and and swelling    Dressing on Arrival 2 layer(wet)      Allergies: Latex and Penicillins    Assessment:    Visit Vitals     /70     Pulse 88     Temp 98.1  F (36.7  C) (Oral)     Resp 16       General:  Patient presents to clinic in no apparent distress.  Psychiatric:  Alert and oriented x3.   Lower extremity:  edema is not present.    Integumentary:  Skin is uniformly warm, dry and pink.    Wound size:   Wound 10/25/16  L lateral inferior calf (Active)   Pre Size Length 0.5 2/2/2017  2:00 PM   Pre Size Width 0.5 2/2/2017  2:00 PM   Pre Total Sq cm 0.03 2/2/2017  2:00 PM   Undermined na 10/25/2016  2:00 PM   Tunneling na 10/25/2016  2:00 PM   Description scar 2/22/2017 10:00 AM   Prodcut Used ABD Pad 1/19/2017 10:00 AM       Wound 01/10/17 left shin (Active)   Pre Size Length 0.3 2/2/2017  2:00 PM   Pre Size Width 0.5 2/2/2017  2:00 PM   Pre Total Sq cm 0.02 2/2/2017  2:00 PM   Undermined N 1/17/2017  9:00 AM   Tunneling N 1/17/2017  9:00 AM   Description scar 2/22/2017 10:00 AM   Prodcut Used ABD Pad 1/19/2017 10:00 AM       Wound 02/22/17 left medial foot (Active)   Pre Size Length 0.1 3/1/2017 10:00 AM   Pre Size Width 1 3/1/2017 10:00 AM   Pre Total Sq cm 0.1 3/1/2017 10:00 AM   Post Size Length 0.2 3/1/2017 10:00 AM   Post Size Width 0.8 3/1/2017 10:00 AM      Undermining is not present.    The periwoundskin is WNL and maceration      Plan:         1. Patient will Follow up in 1 week         2. Treatment provided:  Patient here today for an add on visit due to getting his dressing wet on his left leg.  Patients dressing was removed and a new dressing was applied.  Patient tolerated dressing change well and was educated in how to avoid this in the future.  Patient states he will be following up with his primary due to his red rash on his face tomorrow.     Cleansed with: normal  saline  Protected skin with: none  Applied: teg Ag mesh with saline  Packed/filled with: none  Covered with: ABD pads  Secured with: [place] 2 layer

## 2021-06-12 NOTE — PROGRESS NOTES
Date of Service:9/1/2017    Chief Complaint:   Chief Complaint   Patient presents with     Wound Check       History:    Ever presents to clinic for evaluation of his foot ulcers.  He has a history that is significant for CAD, diabetes, and venous insufficiency.  He was last seen in clinic on 8/10/2017 secondary to his ulcer reopening in April. He has velcro compression wraps at home, but does not like them because they are so bulky.      At his last visit two weeks ago, he was to have a 4-layer wrap applied.  He is tolerating the wrap and his leg is not as itchy as it had been.  He denies any pain in the ulcer.    Current Outpatient Prescriptions   Medication Sig Dispense Refill     cephalexin (KEFLEX) 500 MG capsule        diphenhydrAMINE (BENADRYL) 25 mg tablet Take 1 tablet (25 mg total) by mouth every 6 (six) hours as needed for itching. 30 tablet 0     furosemide (LASIX) 20 MG tablet Take 20 mg by mouth daily.       glipiZIDE (GLUCOTROL) 5 MG tablet Take 5 mg by mouth daily.       hydrocortisone 1 % cream        lisinopril (PRINIVIL,ZESTRIL) 40 MG tablet Take 40 mg by mouth daily.       metFORMIN (GLUCOPHAGE) 500 MG tablet Take 500 mg by mouth 2 (two) times a day with meals. Take one tablet by mouth every morning and 2 tablets by mouth every night at bedtime.       metoprolol succinate (TOPROL XL) 50 MG 24 hr tablet Take 1 tablet (50 mg total) by mouth daily. 90 tablet 3     nitroglycerin (NITROSTAT) 0.4 MG SL tablet        omeprazole (PRILOSEC) 20 MG capsule Take 20 mg by mouth daily.       predniSONE (DELTASONE) 10 MG tablet Take 10 mg by mouth 4 (four) times a day.       simvastatin (ZOCOR) 40 MG tablet Take 40 mg by mouth bedtime.       sotalol (BETAPACE) 80 MG tablet Take 80 mg by mouth 2 (two) times a day.       triamcinolone (KENALOG) 0.1 % cream Apply to your lower legs daily for two weeks 80 g 1     WAL-DRYL ALLERGY 25 mg capsule        warfarin (COUMADIN) 5 MG tablet Take 5 mg by mouth daily. Adjust  dose based on INR results as directed.       Current Facility-Administered Medications   Medication Dose Route Frequency Provider Last Rate Last Dose     lidocaine 2 % jelly (XYLOCAINE)   Topical PRN Miriam Elizabeth DANIELA Quinones   1 application at 09/01/17 0921       Allergies   Allergen Reactions     Latex      Penicillins        Physical Exam:    Vitals:    09/01/17 0934   BP: 144/82   Pulse: 76   Resp: 20   Temp: 97.8  F (36.6  C)    There is no height or weight on file to calculate BMI.    General:  72 y.o. male in no apparent distress.    Psychiatric:  Alert and oriented x 3.  Cooperative.   Integumentary:  Skin is uniformly warm, dry and pink.  Lower extremity edema: minimal  Ulcerations:  There is erika wound erythema, no induration, maceration, denudement, fluctuance or warmth surrounding the excoriations. Venous dermatitis.    Labs:    No results found for: SEDRATE  Lab Results   Component Value Date    CRP 2.4 (H) 02/14/2017     No components found for: CREATINE  Lab Results   Component Value Date    BUN 34 (H) 08/25/2017     Lab Results   Component Value Date    HGBA1C 6.1 10/25/2013         Vasc Edema 2/22/2017 3/1/2017 3/17/2017 3/30/2017 8/29/2017   Right just above MTP 22 23 23.4 - 23.8   Right Ankle 24.5 24 24 - 24.5   Right Widest Calf 34 36 35.8 - 39.4   Right Thigh Up 10cm - - - - -   Left - just above MTP 22 22 23.5 24 24   Left Ankle 21 20.5 24 22.5 23.2   Left Widest Calf 33 32 33.2 34 38.5   Left Thigh Up 10cm - - - - -       See wound flow sheet for wound measurements      Assessment:  1. Leg ulcer, left, limited to breakdown of skin     2. Venous hypertension, chronic, bilateral     3. Polyneuropathy associated with underlying disease         A new wound was identified today: yes, located on the left foot    Plan:  1.  Debridement of the foot and toe ulcer was recommended.  After consent was obtained and topical 2% Xylocaine was applied under clean conditions, and using a #15 blade,the  devitalized dermal tissue was debrided for a total square centimeters of .4.  Following debridement, there was a decrease in the nonviable tissue. The patient tolerated the procedure without any difficulty.     2.  Left foot ulceration.  Ulcer is measuring smaller.  He finished his antibiotic, Keflex.     3.  Toe ulcer secondary to trauma, resolved    4.  Treatment:  Will discontinue silvercel to the ulcers.  Will start melgisorb and continue with an  ABD on top. A 4-layer wrap will be applied to his left leg.  To the right leg tubigrip was applied for compression.  When he gets home, he was instructed to moisturize daily and wear his compression stockings.     5.   Patient will follow up with a nurse in one week to have his wrap changed.  He will see me in two week for reevaluation to ensure treatment plan is appropriate.       Miriam Quinones, APRN, CNP,  [facility]  [phone]

## 2021-06-12 NOTE — PROGRESS NOTES
Date of Service:8/25/2017    Chief Complaint:   Chief Complaint   Patient presents with     Wound Check       History:    Ever presents to clinic for evaluation of his foot ulcers.  He has a history that is significant for CAD, diabetes, and venous insufficiency.  He was last seen in clinic on 8/10/2017 secondary to his ulcer reopening in April. He has velcro compression wraps at home, but does not like them because they are so bulky.      At his last visit two weeks ago, he was to have a 2-layer wrap applied.  He states that he did not go home with a 2-layer wrap on.  Tubigrip was applied and the left over roll of tubigrip was given to him.  He was to return for a visit the following week for a nurse visit, but he was not seen.  He has been using TMC on his lower legs and ulcer because he did not have any dressings to apply to his ulcer.  He was applying paper towels over it.  A few days ago, we was seen by his primary because he scratched himself and his leg was bleeding.  He was prescribed Kefelx and believes the ulcer is improving.        Current Outpatient Prescriptions   Medication Sig Dispense Refill     cephalexin (KEFLEX) 500 MG capsule        diphenhydrAMINE (BENADRYL) 25 mg tablet Take 1 tablet (25 mg total) by mouth every 6 (six) hours as needed for itching. 30 tablet 0     furosemide (LASIX) 20 MG tablet Take 20 mg by mouth daily.       glipiZIDE (GLUCOTROL) 5 MG tablet Take 5 mg by mouth daily.       hydrocortisone 1 % cream        lisinopril (PRINIVIL,ZESTRIL) 40 MG tablet Take 40 mg by mouth daily.       metFORMIN (GLUCOPHAGE) 500 MG tablet Take 500 mg by mouth 2 (two) times a day with meals. Take one tablet by mouth every morning and 2 tablets by mouth every night at bedtime.       metoprolol succinate (TOPROL XL) 50 MG 24 hr tablet Take 1 tablet (50 mg total) by mouth daily. 90 tablet 3     nitroglycerin (NITROSTAT) 0.4 MG SL tablet        omeprazole (PRILOSEC) 20 MG capsule Take 20 mg by mouth  daily.       predniSONE (DELTASONE) 10 MG tablet Take 10 mg by mouth 4 (four) times a day.       simvastatin (ZOCOR) 40 MG tablet Take 40 mg by mouth bedtime.       sotalol (BETAPACE) 80 MG tablet Take 80 mg by mouth 2 (two) times a day.       triamcinolone (KENALOG) 0.1 % cream Apply to your lower legs daily for two weeks 80 g 1     triamcinolone (KENALOG) 0.1 % ointment        WAL-DRYL ALLERGY 25 mg capsule        warfarin (COUMADIN) 5 MG tablet Take 5 mg by mouth daily. Adjust dose based on INR results as directed.       Current Facility-Administered Medications   Medication Dose Route Frequency Provider Last Rate Last Dose     lidocaine 2 % jelly (XYLOCAINE)   Topical PRN Miriam Quinones CNP   1 application at 08/25/17 0938       Allergies   Allergen Reactions     Latex      Penicillins        Physical Exam:    Vitals:    08/25/17 0937   BP: 120/78   Resp: 16   Temp: 97.4  F (36.3  C)    There is no height or weight on file to calculate BMI.    General:  72 y.o. male in no apparent distress.    Psychiatric:  Alert and oriented x 3.  Cooperative.   Integumentary:  Skin is uniformly warm, dry and pink.  Lower extremity edema: minimal  Ulcerations:  There is erika wound erythema, no induration, maceration, denudement, fluctuance or warmth surrounding the excoriations.    Labs:    No results found for: SEDRATE  Lab Results   Component Value Date    CRP 2.4 (H) 02/14/2017     No components found for: CREATINE  Lab Results   Component Value Date    BUN 27 02/14/2017     Lab Results   Component Value Date    HGBA1C 6.1 10/25/2013         Vasc Edema 1/24/2017 2/22/2017 3/1/2017 3/17/2017 3/30/2017   Right just above MTP 23 22 23 23.4 -   Right Ankle 25 24.5 24 24 -   Right Widest Calf 41 34 36 35.8 -   Right Thigh Up 10cm - - - - -   Left - just above MTP 24 22 22 23.5 24   Left Ankle 23 21 20.5 24 22.5   Left Widest Calf 39 33 32 33.2 34   Left Thigh Up 10cm - - - - -       See wound flow sheet for wound  measurements      Assessment:  1. Leg ulcer, left, limited to breakdown of skin     2. Venous hypertension, chronic, bilateral     3. Local infection of wound         A new wound was identified today: yes, located on the left foot    Plan:  1.  Debridement of the foot and toe ulcer was recommended.  After consent was obtained and topical 2% Xylocaine was applied under clean conditions, and using a #15 blade,the devitalized dermal tissue was debrided for a total square centimeters of 2.3. Following debridement, there was a decrease in the nonviable tissue. The patient tolerated the procedure without any difficulty.     2.  Left foot ulceration.  Ulcer is measuring larger.  He started Keflex a couple of days ago.  Also, the recommended treatment given at his visit two weeks ago, apparently was different upon discharge.  Question that he is using compression secondary to the swelling in his legs.      3.  Toe ulcer secondary to trauma, resolved    4.  Treatment:  Will apply silvercel to the ulcers and cover the leg with an ABD and apply a 4-layer wrap to both legs.  He will complete taking his oral antibiotic. He will stop the TMC cream.    5.   Patient will follow up with a nurse in 3 days to have his wrap changed secondary to his weeping legs.  He will see me in one week  for reevaluation to ensure treatment plan is appropriate.  .      Miriam Quinones, APRN, CNP,  [facility]  [phone]

## 2021-06-12 NOTE — PROGRESS NOTES
Date of Service:8/10/2017    Chief Complaint:   Chief Complaint   Patient presents with     Follow-up     Leg edema w/ left medial malleoler ulcer       History:    Ever presents to clinic for evaluation of his foot ulcers.  He has a history that is significant for CAD, diabetes, and venous insufficiency.  He was last seen in clinic on 3/30/2017 and his ulcer had healed.  He has velcro compression wraps at home, but does not like them because they are so bulky.  He has been using Tubigrip for compression, but has not replaced them since March.  His ulcer reopened in April and has been using gauze over it.  This morning he attempted to clip the callous on his left great toe and accidentally went to deep.  He is denying pain in his ulcers and does not believe that it is draining more.    Current Outpatient Prescriptions   Medication Sig Dispense Refill     diphenhydrAMINE (BENADRYL) 25 mg tablet Take 1 tablet (25 mg total) by mouth every 6 (six) hours as needed for itching. 30 tablet 0     furosemide (LASIX) 20 MG tablet Take 20 mg by mouth daily.       glipiZIDE (GLUCOTROL) 5 MG tablet Take 5 mg by mouth daily.       hydrocortisone 1 % cream        lisinopril (PRINIVIL,ZESTRIL) 40 MG tablet Take 40 mg by mouth daily.       metFORMIN (GLUCOPHAGE) 500 MG tablet Take 500 mg by mouth 2 (two) times a day with meals. Take one tablet by mouth every morning and 2 tablets by mouth every night at bedtime.       metoprolol succinate (TOPROL XL) 50 MG 24 hr tablet Take 1 tablet (50 mg total) by mouth daily. 90 tablet 3     nitroglycerin (NITROSTAT) 0.4 MG SL tablet        omeprazole (PRILOSEC) 20 MG capsule Take 20 mg by mouth daily.       predniSONE (DELTASONE) 10 MG tablet Take 10 mg by mouth 4 (four) times a day.       simvastatin (ZOCOR) 40 MG tablet Take 40 mg by mouth bedtime.       sotalol (BETAPACE) 80 MG tablet Take 80 mg by mouth 2 (two) times a day.       triamcinolone (KENALOG) 0.1 % cream Apply to your lower legs  daily for two weeks 80 g 1     triamcinolone (KENALOG) 0.1 % ointment        WAL-DRYL ALLERGY 25 mg capsule        warfarin (COUMADIN) 5 MG tablet Take 5 mg by mouth daily. Adjust dose based on INR results as directed.       Current Facility-Administered Medications   Medication Dose Route Frequency Provider Last Rate Last Dose     lidocaine 2 % jelly (XYLOCAINE)   Topical PRN Miriam Lewese Stacie, DANIELA           Allergies   Allergen Reactions     Latex      Penicillins        Physical Exam:    Vitals:    08/10/17 1014   BP: 124/78   Pulse: 78   Resp: 16   Temp: 97.8  F (36.6  C)    Body mass index is 29.7 kg/(m^2).    General:  72 y.o. male in no apparent distress.    Psychiatric:  Alert and oriented x 3.  Cooperative.   Integumentary:  Skin is uniformly warm, dry and pink.  Lower extremity edema: minimal  Ulcerations:  There is erika wound erythema, no induration, maceration, denudement, fluctuance or warmth surrounding the excoriations.    Labs:    No results found for: SEDRATE  Lab Results   Component Value Date    CRP 2.4 (H) 02/14/2017     No components found for: CREATINE  Lab Results   Component Value Date    BUN 27 02/14/2017     Lab Results   Component Value Date    HGBA1C 6.1 10/25/2013     Lab Results   Component Value Date    ALBUMIN 4.3 04/26/2016       Vasc Edema 1/24/2017 2/22/2017 3/1/2017 3/17/2017 3/30/2017   Right just above MTP 23 22 23 23.4 -   Right Ankle 25 24.5 24 24 -   Right Widest Calf 41 34 36 35.8 -   Right Thigh Up 10cm - - - - -   Left - just above MTP 24 22 22 23.5 24   Left Ankle 23 21 20.5 24 22.5   Left Widest Calf 39 33 32 33.2 34   Left Thigh Up 10cm - - - - -       See wound flow sheet for wound measurements      Assessment:  1. Leg ulcer, left, limited to breakdown of skin     2. Venous hypertension, chronic, bilateral     3. Diabetes     4. Polyneuropathy associated with underlying disease         A new wound was identified today: yes, located on the left foot    Plan:  1.   Debridement of the foot and toe ulcer was recommended.  After consent was obtained and topical 2% Xylocaine was applied under clean conditions, and using a #15 blade,the devitalized dermal tissue was debrided for a total square centimeters of 1.26. Following debridement, there was a decrease in the nonviable tissue. The patient tolerated the procedure without any difficulty.       2.  Left foot ulceration.  I am concerned about infection.  I obtained an aerobic culture and sensitivity today. I will treat with topical antimicrobial and await the culture results.    3.  Toe ulcer secondary to trauma, no signs of infection    4.  Treatment:   Will apply silvercel to the ulcers and cover the leg with an ABD and apply a 2-layer wrap.  A Tegaderm Foam Adhesive will be applied to his toe ulcer    5.   Patient will follow up with me in one week  for reevaluation.      Miriam Quinones, APRN, CNP,  [facility] [place]  [phone]

## 2021-06-13 NOTE — PATIENT INSTRUCTIONS - HE
Ever,    It was a pleasure to see you today at [facility].    You will see Dr. Moe in one year.    Please call us if you have any questions or concerns about your heart.      Heladio Moe M.D.  [facility]

## 2021-06-13 NOTE — ANESTHESIA POSTPROCEDURE EVALUATION
Patient: Ever Crane  LEFT TRANSMETATARSAL AMPUTATION  Anesthesia type: regional    Patient location: PACU  Last vitals:   Vitals:    11/05/17 1000   BP: 113/68   Pulse: 81   Resp: 19   Temp: 36.6  C (97.8  F)   SpO2: 97%     Post vital signs: stable  Level of consciousness: awake and responds to simple questions  Post-anesthesia pain: pain controlled  Post-anesthesia nausea and vomiting: no  Pulmonary: unassisted, return to baseline  Cardiovascular: stable and blood pressure at baseline  Hydration: adequate  Anesthetic events: no    QCDR Measures:  ASA# 11 - Vida-op Cardiac Arrest: ASA11B - Patient did NOT experience unanticipated cardiac arrest  ASA# 12 - Vida-op Mortality Rate: ASA12B - Patient did NOT die  ASA# 13 - PACU Re-Intubation Rate: NA - No ETT / LMA used for case  ASA# 10 - Composite Anes Safety: ASA10A - No serious adverse event    Additional Notes:

## 2021-06-13 NOTE — TELEPHONE ENCOUNTER
Wellness Screening Tool  Symptom Screening:  Do you have one of the following NEW symptoms:    Fever (subjective or >100.0)?  No    A new cough?  No    Shortness of breath?  No     Chills? No     New loss of taste or smell? No     Generalized body aches? No     New persistent headache? No     New sore throat? No     Nausea, vomiting, or diarrhea?  No    Within the past 2 weeks, have you been exposed to someone with a known positive illness below:    COVID-19 (known or suspected)?  No    Chicken pox?  No    Mealses?  No    Pertussis?  No    Patient notified of visitor policy- They may have one person accompany them to their appointment, but they will need to wear a mask and will be screened upon arrival for symptoms: Yes  Pt informed to wear a mask: Yes  Pt notified if they develop any symptoms listed above, prior to their appointment, they are to call the clinic directly at [phone] for further instructions.  Yes  Patient's appointment status: Patient will be seen in clinic as scheduled on 11/12

## 2021-06-13 NOTE — ANESTHESIA CARE TRANSFER NOTE
Last vitals:   Vitals:    11/05/17 0939   BP: 103/61   Pulse: 84   Resp: 18   Temp: 36.9  C (98.5  F)   SpO2: 98%     Patient's level of consciousness is awake  Spontaneous respirations: yes  Maintains airway independently: yes  Dentition unchanged: yes  Oropharynx: oropharynx clear of all foreign objects    QCDR Measures:  ASA# 20 - Surgical Safety Checklist: WHO surgical safety checklist completed prior to induction  PQRS# 430 - Adult PONV Prevention: 4558F - Pt received => 2 anti-emetic agents (different classes) preop & intraop  ASA# 8 - Peds PONV Prevention: NA - Not pediatric patient, not GA or 2 or more risk factors NOT present  PQRS# 424 - Vida-op Temp Management: 4559F - At least one body temp DOCUMENTED => 35.5C or 95.9F within required timeframe  PQRS# 426 - PACU Transfer Protocol: - Transfer of care checklist used  ASA# 14 - Acute Post-op Pain: ASA14B - Patient did NOT experience pain >= 7 out of 10

## 2021-06-13 NOTE — PROGRESS NOTES
Date of Service:10/5/2017    Chief Complaint:   Chief Complaint   Patient presents with     Wound Check       History:    Ever presents to clinic for evaluation of his foot ulcers.  He has a history that is significant for CAD, diabetes, and venous insufficiency.  He was last seen in clinic by me on 9/14/2017.  He has velcro compression wraps at home, but does not like them because they are so bulky.      He failed his appointment last week for a 4-layer wrap.  Therefore, it was on for the past 2 weeks.  He is denying any pain in his ulcer.  He has orthotic shoes at home, but is not wearing them because he believes they are to big.  He developed a new ulcer on the second anterior toe of his right foot.  Presumably, this ulceration and scabs on the toes either side are consistent with pressure from his shoe.  He reports that he is wearing his velcro compression wraps daily.  However, he does not present to clinic with it.        Current Outpatient Prescriptions   Medication Sig Dispense Refill     diphenhydrAMINE (BENADRYL) 25 mg tablet Take 1 tablet (25 mg total) by mouth every 6 (six) hours as needed for itching. 30 tablet 0     doxycycline (VIBRA-TABS) 100 MG tablet        furosemide (LASIX) 20 MG tablet Take 20 mg by mouth daily.       glipiZIDE (GLUCOTROL) 5 MG tablet Take 5 mg by mouth daily.       hydrocortisone 1 % cream        lisinopril (PRINIVIL,ZESTRIL) 40 MG tablet Take 40 mg by mouth daily.       metFORMIN (GLUCOPHAGE) 500 MG tablet Take 500 mg by mouth 2 (two) times a day with meals. Take one tablet by mouth every morning and 2 tablets by mouth every night at bedtime.       metoprolol succinate (TOPROL XL) 50 MG 24 hr tablet Take 1 tablet (50 mg total) by mouth daily. 90 tablet 3     nitroglycerin (NITROSTAT) 0.4 MG SL tablet        nystatin (MYCOSTATIN) powder Apply to red, irritated areas of skin 2-3 times a day until healed. 30 g 0     omeprazole (PRILOSEC) 20 MG capsule Take 20 mg by mouth daily.        predniSONE (DELTASONE) 10 MG tablet Take 10 mg by mouth 4 (four) times a day.       simvastatin (ZOCOR) 40 MG tablet Take 40 mg by mouth bedtime.       sotalol (BETAPACE) 80 MG tablet Take 80 mg by mouth 2 (two) times a day.       triamcinolone (KENALOG) 0.1 % cream Apply to your lower legs daily for two weeks 80 g 1     WAL-DRYL ALLERGY 25 mg capsule        warfarin (COUMADIN) 5 MG tablet Take 5 mg by mouth daily. Adjust dose based on INR results as directed.       Current Facility-Administered Medications   Medication Dose Route Frequency Provider Last Rate Last Dose     lidocaine 2 % jelly (XYLOCAINE)   Topical PRN Miriam Quinones, CNP   1 application at 10/04/17 1138       Allergies   Allergen Reactions     Latex      Penicillins        Physical Exam:    Vitals:    10/04/17 1126   BP: 130/72   Pulse: 76   Resp: 20   Temp: 97.7  F (36.5  C)    There is no height or weight on file to calculate BMI.    General:  72 y.o. male in no apparent distress.    Psychiatric:  Alert and oriented x 3.  Cooperative.   Integumentary:  Skin is uniformly warm, dry and pink.  Lower extremity edema: minimal  Ulcerations:  There is erika wound erythema, no induration, maceration, denudement, fluctuance or warmth surrounding the excoriations. Venous dermatitis.    Labs:    No results found for: SEDRATE  Lab Results   Component Value Date    CRP 2.4 (H) 02/14/2017     No components found for: CREATINE  Lab Results   Component Value Date    BUN 34 (H) 08/25/2017     Lab Results   Component Value Date    HGBA1C 6.1 10/25/2013         Vasc Edema 8/29/2017 9/7/2017 9/12/2017 9/14/2017 9/21/2017   Right just above MTP 23.8 24.5 23.7 23 25   Right Ankle 24.5 27 23.7 22 27   Right Widest Calf 39.4 40 36 36.5 39   Right Thigh Up 10cm - 46 - - -   Left - just above MTP 24 23 24.5 23 23   Left Ankle 23.2 22.5 22 22 22   Left Widest Calf 38.5 38.5 35.2 34.5 34   Left Thigh Up 10cm - 45.5 - - -       See wound flow sheet for wound  measurements      Assessment:  1. Leg ulcer, left, limited to breakdown of skin     2. Polyneuropathy associated with underlying disease     3. Venous hypertension, chronic, bilateral     4. Diabetes mellitus, type 2     5. Skin ulcer of toe of right foot, limited to breakdown of skin         A new wound was identified today: yes, located on the right toe    Plan:  1.  Debridement of the left leg ulcer was recommended.  After consent was obtained and topical 2% Xylocaine was applied under clean conditions, and using a #15 blade,the devitalized dermal tissue was debrided for a total square centimeters of 0.55. Following debridement, there was a decrease in the nonviable tissue. The patient tolerated the procedure without any difficulty.     2.  Left leg ulceration. Resolved.    3.  Right second anterior toe ulcer, new.  Suspect is from his shoe.  Post-surgical boot provided for off loading.    3.  Venous insufficiency with ulceration.  A prescription for compression stockings was provided.  He will get the compression stockings from [employer] and start wearing it on his right leg.  He has not gotten his compression stockings.  He needs to follow up to obtain them.  Until he does, he should wear his velcro compression wraps.    4.  Fungal infection, resolved    5.  Diabetes, on Metformin.  No recent A1c found.  Will consider evaluation at his next visit.      6.  Treatment:  To the right second toe ulcer, foam adhesive applied in clinic.  He was instructed to leave it for the week and it will be changed by the nurse in one week.  4-layer wrap applied on the right leg to decrease swelling.  If the leg remains edematous next week, the nurse will reapply.  Otherwise, will continue with the 4-layer wrap. He should obtain his compression stockings from [facility], as prescribed.      7.   Patient will follow up with a nurse in one week to have his wrap changed.  He will see me in two week for reevaluation to ensure treatment  plan is appropriate.       Miriam Quinones APRN, CNP,  [facility], [facility]  [phone]

## 2021-06-13 NOTE — PROGRESS NOTES
Date of Service:9/14/2017    Chief Complaint:   Chief Complaint   Patient presents with     Wound Check       History:    Ever presents to clinic for evaluation of his foot ulcers.  He has a history that is significant for CAD, diabetes, and venous insufficiency.  He was last seen in clinic on 8/10/2017 secondary to his ulcer reopening in April. He has velcro compression wraps at home, but does not like them because they are so bulky.      He is tolerating the 4-layer wrap and his leg is not as itchy as it had been.  He denies any pain in the ulcer.  He does not want to wear his velcro compression wraps ongoing.  He has [employer] at home, but is not willing/able to purchase a box to replace on a regular basis. He is interested in trying compression stockings.    Current Outpatient Prescriptions   Medication Sig Dispense Refill     diphenhydrAMINE (BENADRYL) 25 mg tablet Take 1 tablet (25 mg total) by mouth every 6 (six) hours as needed for itching. 30 tablet 0     doxycycline (VIBRA-TABS) 100 MG tablet        furosemide (LASIX) 20 MG tablet Take 20 mg by mouth daily.       glipiZIDE (GLUCOTROL) 5 MG tablet Take 5 mg by mouth daily.       hydrocortisone 1 % cream        lisinopril (PRINIVIL,ZESTRIL) 40 MG tablet Take 40 mg by mouth daily.       metFORMIN (GLUCOPHAGE) 500 MG tablet Take 500 mg by mouth 2 (two) times a day with meals. Take one tablet by mouth every morning and 2 tablets by mouth every night at bedtime.       metoprolol succinate (TOPROL XL) 50 MG 24 hr tablet Take 1 tablet (50 mg total) by mouth daily. 90 tablet 3     nitroglycerin (NITROSTAT) 0.4 MG SL tablet        omeprazole (PRILOSEC) 20 MG capsule Take 20 mg by mouth daily.       predniSONE (DELTASONE) 10 MG tablet Take 10 mg by mouth 4 (four) times a day.       simvastatin (ZOCOR) 40 MG tablet Take 40 mg by mouth bedtime.       sotalol (BETAPACE) 80 MG tablet Take 80 mg by mouth 2 (two) times a day.       triamcinolone (KENALOG) 0.1 % cream Apply  to your lower legs daily for two weeks 80 g 1     WAL-DRYL ALLERGY 25 mg capsule        warfarin (COUMADIN) 5 MG tablet Take 5 mg by mouth daily. Adjust dose based on INR results as directed.       Current Facility-Administered Medications   Medication Dose Route Frequency Provider Last Rate Last Dose     lidocaine 2 % jelly (XYLOCAINE)   Topical PRN Miriam Johnson Stacie, CNP   1 application at 09/14/17 1101       Allergies   Allergen Reactions     Latex      Penicillins        Physical Exam:    Vitals:    09/14/17 0900   BP: 114/68   Pulse: 72   Resp: 14   Temp: 97.7  F (36.5  C)    There is no height or weight on file to calculate BMI.    General:  72 y.o. male in no apparent distress.    Psychiatric:  Alert and oriented x 3.  Cooperative.   Integumentary:  Skin is uniformly warm, dry and pink.  Lower extremity edema: minimal  Ulcerations:  There is erika wound erythema, no induration, maceration, denudement, fluctuance or warmth surrounding the excoriations. Venous dermatitis.    Labs:    No results found for: SEDRATE  Lab Results   Component Value Date    CRP 2.4 (H) 02/14/2017     No components found for: CREATINE  Lab Results   Component Value Date    BUN 34 (H) 08/25/2017     Lab Results   Component Value Date    HGBA1C 6.1 10/25/2013         Vasc Edema 3/30/2017 8/29/2017 9/7/2017 9/12/2017 9/14/2017   Right just above MTP - 23.8 24.5 23.7 23   Right Ankle - 24.5 27 23.7 22   Right Widest Calf - 39.4 40 36 36.5   Right Thigh Up 10cm - - 46 - -   Left - just above MTP 24 24 23 24.5 23   Left Ankle 22.5 23.2 22.5 22 22   Left Widest Calf 34 38.5 38.5 35.2 34.5   Left Thigh Up 10cm - - 45.5 - -       See wound flow sheet for wound measurements      Assessment:  1. Venous hypertension, chronic, bilateral  Compression stockings   2. Leg ulcer, left, limited to breakdown of skin  Compression stockings   3. Fungal infection of skin         A new wound was identified today: yes, located on the left foot    Plan:  1.   Debridement of the left leg ulcer was recommended.  After consent was obtained and topical 2% Xylocaine was applied under clean conditions, and using a #15 blade,the devitalized dermal tissue was debrided for a total square centimeters of 0.55. Following debridement, there was a decrease in the nonviable tissue. The patient tolerated the procedure without any difficulty.     2.  Left leg ulceration. improving    3.  Venous insufficiency with ulceration.  A prescription for compression stockings was provided.  He will get the compression stockings from [facility] and start wearing it on his right leg.    4.  Fungal infection, clinical signs of infection on his right foot.  Nystatin powdered prescribed.  He will apply it every pm    4.  Treatment:  Will apply adaptic and a 4-layer wrap will be applied to his left leg.  To the right leg, compression stocking was applied.      5.   Patient will follow up with a nurse in one week to have his wrap changed.  He will see me in two week for reevaluation to ensure treatment plan is appropriate.       Miriam Quinones, APRN, CNP,  [facility]  [phone]

## 2021-06-13 NOTE — ANESTHESIA PREPROCEDURE EVALUATION
Anesthesia Evaluation      Patient summary reviewed     Airway   Mallampati: II   Pulmonary - normal exam   (+) a smoker (former)                         Cardiovascular - normal exam  (+) hypertension, CAD, CABG/stent (CABGx2 in 2000), dysrhythmias (Aflutter), cardiomyopathy (ICM - EF 45-50%), hypercholesterolemia, PVD    ECG reviewed  Rhythm: regular  Rate: normal,         Neuro/Psych    (+) neuromuscular disease,      Endo/Other    (+) diabetes mellitus (hgb A1C 6.6% 11/2017) type 2,      GI/Hepatic/Renal       Other findings: Multiple DM infected foot ulcers, on antibiotics.  Last warfarin dose 11/1. S/p 2 FFP on 11/4. INR 1.66 on 11/4.    11/3/17 ECHO:   1. The left ventricle is normal in size. Left ventricular systolic performance is mildly reduced. The ejection fraction is estimated to be 45-50%.    2. There is moderate-severe basal inferior hypokinesis.  There is moderate posterior hypokinesis. On selected views, the lateral wall appears mildly hypokinetic.  3. There is mild aortic insufficiency.   4. There is mild, to perhaps mild-moderate, tricuspid insufficiency.   5. Normal right ventricular size and systolic performance.   6. There is mild biatrial enlargement.  7. Right ventricular systolic pressure relative to right atrial pressure is mildly increased.  The pulmonary artery pressure is estimated to be 35-40 mmHg plus right atrial pressure (the IVC is not well-visualized on the study).      When compared to the prior real-time echocardiogram dated 5 June 2013, there has been little appreciable interval change.  Specifically, it is this reader's impression that left ventricular systolic performance and regional wall motion appears fairly similar on both examinations.  The described wall motion abnormalities are not felt to be new.      Dental - normal exam                 Chemistry        Component Value Date/Time     11/05/2017 0556    K 4.3 11/05/2017 0556     11/05/2017 0556    CO2 23  11/05/2017 0556    BUN 19 11/05/2017 0556    CREATININE 1.00 11/05/2017 0556     (H) 11/05/2017 0556        Component Value Date/Time    CALCIUM 9.4 11/05/2017 0556    ALKPHOS 95 10/31/2017 1756    AST 18 10/31/2017 1756    ALT 14 10/31/2017 1756    BILITOT 1.6 (H) 10/31/2017 1756        Lab Results   Component Value Date    WBC 9.1 11/05/2017    HGB 11.4 (L) 11/05/2017    HCT 34.0 (L) 11/05/2017    MCV 84 11/05/2017     11/05/2017     Lab Results   Component Value Date    INR 1.52 (H) 11/05/2017    INR 1.66 (H) 11/04/2017    INR 1.56 (H) 11/03/2017                Anesthesia Plan  Planned anesthetic: peripheral nerve block      ASA 3   Induction: intravenous   Anesthetic plan and risks discussed with: patient    Post-op plan: routine recovery

## 2021-06-13 NOTE — ANESTHESIA PROCEDURE NOTES
Peripheral Block    Patient location during procedure: pre-op  Start time: 11/5/2017 8:05 AM  End time: 11/5/2017 8:10 AM  post-op analgesia per surgeon order as noted in medical record  Staffing:  Performing  Anesthesiologist: ALONDRA GOODMAN  Preanesthetic Checklist  Completed: patient identified, site marked, risks, benefits, and alternatives discussed, timeout performed, consent obtained, at patient's request, airway assessed, oxygen available, suction available, emergency drugs available and hand hygiene performed  Peripheral Block  Block type: saphenous, adductor canal block  Prep: ChloraPrep  Patient position: supine  Patient monitoring: cardiac monitor, continuous pulse oximetry, heart rate and blood pressure  Injection technique: ultrasound guided          Needle  Needle type: Stimuplex   Needle gauge: 21 G  Needle length: 4 in    Assessment  Injection assessment: no difficulty with injection, negative aspiration for heme and incremental injection

## 2021-06-13 NOTE — ANESTHESIA PROCEDURE NOTES
Peripheral Block    Patient location during procedure: pre-op  Start time: 11/5/2017 7:55 AM  End time: 11/5/2017 8:05 AM  surgical anesthesia  Staffing:  Performing  Anesthesiologist: ALONDRA GOODMAN  Preanesthetic Checklist  Completed: patient identified, site marked, risks, benefits, and alternatives discussed, timeout performed, consent obtained, at patient's request, airway assessed, oxygen available, suction available, emergency drugs available and hand hygiene performed  Peripheral Block  Block type: sciatic, popliteal  Prep: ChloraPrep  Patient monitoring: cardiac monitor, continuous pulse oximetry, heart rate and blood pressure  Laterality: left  Injection technique: ultrasound guided          Needle  Needle type: Stimuplex   Needle gauge: 21 G  Needle length: 4 in  no peripheral nerve catheter placed  Assessment  Injection assessment: no difficulty with injection, negative aspiration for heme, incremental injection and no paresthesia on injection

## 2021-06-14 NOTE — PROGRESS NOTES
[facility]    DATE: 2017  NAME: Ever Crane  : 1945           MR# [id number]     CODE STATUS:  FULL CODE  VISIT TYPE: Admission  FACILITY: [facility] [[id number]]    ROOM: 215  PRIMARY CARE PROVIDER: Nandini Camarillo CNP Phone: [phone] Fax:[phone]    History of Present Illness:   Ever Crane is a 72 y.o. male with severe peripheral vascular disease related to his diabetes resulting in transmetatarsal fracture of the left foot.  As he speaks about it his symptoms exacerbated after kneeling 4-8 hour days on a patio slab in the cold.  It was not fresh cement but I am suspicious that the open toed shoes he was wearing because significant vascular limitation resulting in his current status.  His left foot amputation is a very clean-looking dressing with no redness or drainage and a Crosser coming off his right toe lesions with residual crust on the first third and fourth digits.  I see no sign of active infection but both lower extremities are swollen with stasis type changes confirming the vascular insufficiency that is present and causing him problems.  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  I counseled him to stay out of the cold weather and to have appropriate foot care on to try to prevent recurrence.  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  Social service reports from the hospital indicate he is not able to live with his son Raciel because of deficiencies in his son's ability to care for him.  He is planning to return there once he is healed however.  He has a follow-up with vascular surgery soon and hopes to be released to return home    Past Medical History:  Past Medical History:   Diagnosis Date     Atrial flutter      Coronary Artery Disease     CABG x2     Dyslipidemia, goal LDL below 70       Foot ulcer      Hypertension      Neuropathy      Paroxysmal Atrial Fibrillation     Brian Moe: 2011 Cardioversion; CHADS2 = 2 he is on warfarin and sotalol      Type 2 Diabetes Mellitus        Past Surgical History:  Past Surgical History:   Procedure Laterality Date     CARDIOVERSION  2011     CORONARY ARTERY BYPASS GRAFT  3/6/2000    SVG to OM1, SVG to PDA     FOOT AMPUTATION Left 2017    Procedure: LEFT TRANSMETATARSAL AMPUTATION;  Surgeon: Ever Wick MD;  Location: [facility] OR;  Service:      INGUINAL HERNIA REPAIR Bilateral  and        Allergies:  Allergies   Allergen Reactions     Latex      Penicillins        Social History:  Social History     Social History     Marital status:      Spouse name: N/A     Number of children: 1     Years of education: N/A     Occupational History      Self Employed     active     Social History Main Topics     Smoking status: Former Smoker     Types: Cigarettes     Quit date: 2000     Smokeless tobacco: Never Used     Alcohol use 0.0 oz/week     2 - 3 Glasses of wine, 2 - 3 Cans of beer per week     Drug use: No     Sexual activity: Yes     Other Topics Concern     Not on file     Social History Narrative    Two granddaughters Haven Gipson    Patient Lives with son Raciel  2017           Family History:  Family History   Problem Relation Age of Onset     Sudden death Mother 85     CABG Father      Valvular heart disease Father      Esophageal cancer Father 58      of this     No Medical Problems Son        Current Medications:  Current Outpatient Prescriptions   Medication Sig     acetaminophen (TYLENOL) 500 MG tablet Take 2 tablets (1,000 mg total) by mouth every 6 (six) hours as needed.     furosemide (LASIX) 20 MG tablet Take 20 mg by mouth daily.      glipiZIDE (GLUCOTROL) 5 MG tablet Take 0.5 tablets (2.5 mg total) by mouth 2 (two) times a day before meals. Hold if not eating     Eusebio Therapuetic  Nutrition 7-7-1.5 gram PwPk Take 1 packet by mouth 2 (two) times a day.     Lactobacillus acidoph-L.bulgar (LACTINEX) 100 million cell packet Take 1 packet by mouth 3 (three) times a day with meals. While taking antibiotics     lisinopril (PRINIVIL,ZESTRIL) 40 MG tablet Take 0.5 tablets (20 mg total) by mouth daily.     loperamide (IMODIUM) 2 mg capsule Take 1 capsule (2 mg total) by mouth 3 (three) times a day as needed for diarrhea.     metoprolol succinate (TOPROL-XL) 50 MG 24 hr tablet Take 50 mg by mouth 2 (two) times a day.      multivitamin with minerals (THERA-M) 9 mg iron-400 mcg Tab tablet Take 1 tablet by mouth daily.     nitroglycerin (NITROSTAT) 0.4 MG SL tablet Place 0.4 mg under the tongue every 5 (five) minutes as needed for chest pain.      omeprazole (PRILOSEC) 20 MG capsule Take 20 mg by mouth daily.      simvastatin (ZOCOR) 40 MG tablet Take 40 mg by mouth at bedtime.      warfarin (COUMADIN) 5 MG tablet Take 2.5-5 mg by mouth See Admin Instructions. 11/27/17 INR 1.9 Cont 5mg qd. Next INR 11/20/  11/22/17 INR 1.4 Increase to 5mg qd. Next INR 11/27  11/15 17 INR 1.8 5mg M & F, 2.5mg all other days.       Review of Systems:  History obtained from chart review and the patient  General ROS: positive for  - fatigue  negative for - chills or fever  Psychological ROS: Denies dementia or confusion memory loss behavioral disturbance  Ophthalmic ROS: negative for - decreased vision or loss of vision  ENT ROS: negative for - nasal congestion or sore throat  Respiratory ROS: no cough, shortness of breath, or wheezing  Cardiovascular ROS: no chest pain or dyspnea on exertion  Gastrointestinal ROS: no abdominal pain, change in bowel habits, or black or bloody stools  Genito-Urinary ROS: no dysuria, trouble voiding, or hematuria  Musculoskeletal ROS: No pain in either foot with he considers healing lesions at the transmetatarsal amputation and the hemic areas of his right toes  Neurological ROS: no TIA or  stroke symptoms  Dermatological ROS: Clean dry looking new incision on the transmetatarsal amputation on the left.  Right foot shows several toes with dark crust at the tip but no obvious infection       Physical Examination:  /59  Pulse 61  Temp 97.2  F (36.2  C)  Resp 20  Wt 190 lb 4.8 oz (86.3 kg)  SpO2 93%  BMI 26.54 kg/m2  General appearance: alert, appears stated age, cooperative, flushed, no distress and mildly obese  Head: Normocephalic, without obvious abnormality, atraumatic  Eyes: conjunctivae/corneas clear. PERRL, EOM's intact. Fundi benign.  Nose: Nares normal. Septum midline. Mucosa normal. No drainage or sinus tenderness.  Throat: lips, mucosa, and tongue normal; teeth and gums normal  Neck: no adenopathy, no carotid bruit, no JVD, supple, symmetrical, trachea midline and thyroid not enlarged, symmetric, no tenderness/mass/nodules  Back: symmetric, no curvature. ROM normal. No CVA tenderness.  Lungs: clear to auscultation bilaterally  Chest wall: no tenderness  Heart: regular rate and rhythm, S1, S2 normal, no murmur, click, rub or gallop  Abdomen: soft, non-tender; bowel sounds normal; no masses,  no organomegaly  Rectal: deferred  Extremities: Clean dry transmetatarsal amputation incision closure on left foot right foot small tip of digit crusts no evidence of infection  Pulses: Pulses are definitely decreased in lower extremities  Skin: Skin color, texture, turgor normal. No rashes or lesions  Neurologic: Mental status: Alert, oriented, thought content appropriate  Motor:Symmetric strength and tone       Impression:  Ever Crane is a 72 y.o. male with severe peripheral arterial disease with recent infection and transmetatarsal amputation of the left and apparently healing ischemic changes on the right foot    1. PAD (peripheral artery disease)     2. Unable to function independently     3. Diabetic ulcer of both feet     4. Atherosclerosis of native coronary artery of native heart  without angina pectoris     5. Type 2 diabetes mellitus with foot ulcer, without long-term current use of insulin     6. Gangrene of left foot     7. S/P transmetatarsal amputation of foot, left     8. Polyneuropathy associated with underlying disease     9. Persistent atrial fibrillation     10. Dyslipidemia, goal LDL below 70           Plan: Sinew to keep him off his feet and allow healing.  Follow-up with vascular surgery.  I suspect we have not seen the last of his ischemic changes given the embarrassed circulation in the lower extremities although I note he is surprisingly without pain    Electronically signed by: Dheeraj Villalta Sr., MD

## 2021-06-14 NOTE — PROGRESS NOTES
Date of Service:12/22/2017    Chief Complaint:   Chief Complaint   Patient presents with     Follow-up       History:    Ever presents to clinic for evaluation of his foot ulcers.  He has a history that is significant for CAD, diabetes, and venous insufficiency. Status post left transmetatarsal amputation was performed on 11/5/17 by Dr. DESTINY Wick.   On 11/9/2017 he was discharged to a TCU where he currently resides. He is now able to do 50% weight bearing on his heel.  He has a follow up with Dr. DESTINY Wick on January 5.      It is unclear what the care center is applying to the ulcers.  He brings with him a cream with lanolin in it and reports that it is being used on he right leg daily.  However, he now is starting to weep on his right leg.  He denies any pain in the ulcers or feet. He is unaware that he developed ulcers on the ends of his three toes on the right foot secondary to pushing himself with his feet when he is in the wheelchair.     Current Outpatient Prescriptions   Medication Sig Dispense Refill     acetaminophen (TYLENOL) 500 MG tablet Take 2 tablets (1,000 mg total) by mouth every 6 (six) hours as needed.  0     furosemide (LASIX) 20 MG tablet Take 20 mg by mouth daily.        glipiZIDE (GLUCOTROL) 5 MG tablet Take 0.5 tablets (2.5 mg total) by mouth 2 (two) times a day before meals. Hold if not eating (Patient taking differently: Take 5 mg by mouth 2 (two) times a day before meals. Hold if not eating)  0     Eusebio Therapuetic Nutrition 7-7-1.5 gram PwPk Take 1 packet by mouth 2 (two) times a day.  0     lisinopril (PRINIVIL,ZESTRIL) 40 MG tablet Take 0.5 tablets (20 mg total) by mouth daily.  0     loperamide (IMODIUM) 2 mg capsule Take 1 capsule (2 mg total) by mouth 3 (three) times a day as needed for diarrhea.  0     metoprolol succinate (TOPROL-XL) 50 MG 24 hr tablet Take 50 mg by mouth 2 (two) times a day.        multivitamin with minerals (THERA-M) 9 mg iron-400 mcg Tab tablet Take 1 tablet by  mouth daily.       nitroglycerin (NITROSTAT) 0.4 MG SL tablet Place 0.4 mg under the tongue every 5 (five) minutes as needed for chest pain.        omeprazole (PRILOSEC) 20 MG capsule Take 20 mg by mouth daily.        simvastatin (ZOCOR) 40 MG tablet Take 40 mg by mouth at bedtime.        triamcinolone (KENALOG) 0.1 % cream Apply to the right lower leg daily 60 g 1     warfarin (COUMADIN) 5 MG tablet Take by mouth See Admin Instructions. 12/14/17 INR 2.6. Take 5mg daily. Next INR 12/21 11/27/17 INR 1.9 Cont 5mg qd. Next INR 11/20/  11/22/17 INR 1.4 Increase to 5mg qd. Next INR 11/27  11/15 17 INR 1.8 5mg M & F, 2.5mg all other days.       No current facility-administered medications for this visit.        Allergies   Allergen Reactions     Latex      Penicillins        Physical Exam:    Vitals:    12/22/17 0920   BP: 140/60   Pulse: 76   Resp: 18   Temp: 97.6  F (36.4  C)    There is no height or weight on file to calculate BMI.    General:  72 y.o. male in no apparent distress.    Psychiatric:  Alert and oriented x 3.  Cooperative.   Integumentary:  Skin is uniformly warm, dry and pink.  Lower extremity edema: minimal  Ulcerations:  There is no erika wound erythema, no induration, maceration, denudement, fluctuance or warmth surrounding the excoriations. Venous dermatitis.    Labs:    No results found for: SEDRATE  Lab Results   Component Value Date    CRP 8.6 (H) 10/31/2017     No components found for: CREATINE  Lab Results   Component Value Date    BUN 17 11/14/2017     Lab Results   Component Value Date    HGBA1C 6.6 (H) 11/01/2017         Vasc Edema 9/14/2017 9/21/2017 10/11/2017 12/15/2017 12/22/2017   Right just above MTP 23 25 23.8 23.2 25.4   Right Ankle 22 27 25 25.1 26.8   Right Widest Calf 36.5 39 35.6 35.1 40   Right Thigh Up 10cm - - - - -   Left - just above MTP 23 23 24.4 24.0 28   Left Ankle 22 22 24.5 24.2 25.3   Left Widest Calf 34.5 34 36.4 35.8 36.3   Left Thigh Up 10cm - - - - -       See wound  flow sheet for wound measurements      Assessment:  1. Leg ulcer, left, limited to breakdown of skin     2. Polyneuropathy associated with underlying disease     3. Venous hypertension, chronic, bilateral     4. Type 2 diabetes mellitus with foot ulcer, without long-term current use of insulin     5. Acquired lymphedema of lower extremity     6. Unable to function independently     7. S/P transmetatarsal amputation of foot, left     8. PAD (peripheral artery disease)         A new wound was identified today: yes, located on the right third and fourth toe    Plan:  1.  Debridement of the left leg ulcer was recommended.  After consent was obtained and topical 2% Xylocaine was applied under clean conditions, and using a #15 blade,the devitalized dermal tissue was debrided for a total square centimeters of 3.81. Following debridement, there was a decrease in the nonviable tissue. The patient tolerated the procedure without any difficulty.     2.  Right second anterior toe ulcer, new. No signs of infection.    3.  Right third toe ulcer, no signs of infection.    3.  Venous insufficiency with ulceration.  Resolved.  Will need compression    4.  Dermatitis, improved    5.  Edema, worse.  Will start comprilan.    6.  Diabetes, on Metformin.  No recent A1c found.  Will consider evaluation at his next visit.      7.  Left foot amputation site.  Several small open areas along the incision line remain stable.  Will protect and continue to observe.     8.  Treatment:  Will apply adaptic to all of the ulcers and cover with a dry gauze.  This will be changed daily.  Will continue the TMC cream to the dermatitis and start Atractain to moisturize his dry skin.  Comprilan will be started to reduce his edema.      9.  Patient will follow up with a nurse in 2-3 weeks for further evaluation and response to the treatment. He will follow up for his post op visit with Dr. Wick on January 5.    Miriam Quinones, APRN, CNP,  [facility]  [facility]  [phone]

## 2021-06-14 NOTE — PROGRESS NOTES
Vascular surgery: This 72-year-old male comes in 4 weeks following his left transmetatarsal amputation.  His incision is well-healed without evidence of infection.  Sutures were removed and Steri-Strips applied.    The patient does have edema in both legs.  The toes in the right foot actually look improved from previously.  However, with the edema, the patient needs to clearly sit less and lie down more.  He was advised of this.  I am not sure how compliant he will be in that regard.    The patient can now be at least partial weightbearing in his left foot, as much as 50%.

## 2021-06-14 NOTE — PROGRESS NOTES
follow up surgery/ in transitional care currently. severe peripheral vascular disease related to his diabetes resulting in transmetatarsal fracture of the left foot. left foot amputation is a very clean-looking dressing with no redness or drainage and a Crosser coming off his right toe lesions with residual crust on the first third and fourth digits.  I see no sign of active infection but both lower extremities are swollen with stasis type changes

## 2021-06-14 NOTE — PROGRESS NOTES
[facility]      Facility:    [facility] [[id number]]  Code Status: UNKNOWN      Chief Complaint/Reason for Visit:  Chief Complaint   Patient presents with     H & P     Gangrenous necrosis of the left foot with transmetatarsal amputation on 11/5/2017, hospital discharge with TCU and left AMA, back to the hospital on 1113 2/11/2014 and now placement here to the transitional care [place], ischemic cardiomyopathy with coronary artery disease, hyperkalemia, persistent atrial flutter, type 2 diabetes, GERD, history of MRSA.       HPI:   Ever is a 72 y.o. male who has a past medical history of a left transmetatarsal amputation 11/5/2017.  He was discharged from the hospital on 11 9 in left to the TCU.  He then left the TCU AMA and went home was in a unable to care for himself.  He followed up postoperatively with Dr. Wick and was then admitted to the hospital 11/13/2017.  He did have history of hyper bulimia and is also has atrial fibrillation which has been in good rate control.  He does have diabetes with last A1c was 6.6 during previous hospital stay and they continued his diabetic medications.  He is on 3 antibiotics for history of MRSA and he does have gangrenous necrosis in his toes on the contralateral foot.  He does have poor circulation and a history of cardiac disease but is been no signs of chest pain or shortness of breath.  On his last hospitalization his potassium was corrected to 4.4 from 5.1.  He was treated appropriately and transferred here to the TCU at [facility] in stable condition.    Patient claims his pain is good today and he has not had any real pain issues.  He is nonweightbearing on that left leg and the incision looks fine with some dried skin around it but no signs of any infection.  He does deny any other issues at this time he he feels he is being cared for well here at this time.    Past Medical History:  Past Medical  History:   Diagnosis Date     Atrial flutter      Coronary Artery Disease     CABG x2     Dyslipidemia, goal LDL below 70      Foot ulcer      Hypertension      Neuropathy      Paroxysmal Atrial Fibrillation     Brian Moe: 2011 Cardioversion; CHADS2 = 2 he is on warfarin and sotalol      Type 2 Diabetes Mellitus            Surgical History:  Past Surgical History:   Procedure Laterality Date     CARDIOVERSION  2011     CORONARY ARTERY BYPASS GRAFT  3/6/2000    SVG to OM1, SVG to PDA     FOOT AMPUTATION Left 2017    Procedure: LEFT TRANSMETATARSAL AMPUTATION;  Surgeon: Ever Wick MD;  Location: [facility];  Service:      INGUINAL HERNIA REPAIR Bilateral  and        Family History:   Family History   Problem Relation Age of Onset     Sudden death Mother 85     CABG Father      Valvular heart disease Father      Esophageal cancer Father 58      of this     No Medical Problems Son        Social History:    Social History     Social History     Marital status:      Spouse name: N/A     Number of children: 1     Years of education: N/A     Occupational History      Self Employed     Social History Main Topics     Smoking status: Former Smoker     Types: Cigarettes     Quit date: 2000     Smokeless tobacco: Never Used     Alcohol use 0.0 oz/week     2 - 3 Glasses of wine, 2 - 3 Cans of beer per week     Drug use: No     Sexual activity: Yes     Other Topics Concern     None     Social History Narrative    Two granddaughters    Patient arrived with son 10/31/17              Review of Systems   Constitutional:        Patient denies any pain fevers chills nausea vomiting diarrhea change in vision hearing taste or smell weakness one side the other chest pain or shortness of breath.  He denies any incontinence of urine or stool polyphagia or polydipsia polyuria depression or anxiety and the remainder the review of systems is negative.       Vitals:    17 0906    BP: 103/66   Pulse: 88   Resp: 16   Temp: 97.3  F (36.3  C)   SpO2: 96%       Physical Exam   Constitutional: No distress.   HENT:   Head: Normocephalic and atraumatic.   Nose: Nose normal.   Mouth/Throat: No oropharyngeal exudate.   Eyes: Conjunctivae are normal. Right eye exhibits no discharge. Left eye exhibits no discharge. No scleral icterus.   Neck: Neck supple.   Cardiovascular:   Patient's heart sounds are irregularly irregular with adequate rate control.   Pulmonary/Chest: Effort normal and breath sounds normal. No respiratory distress. He has no wheezes.   Abdominal: Soft. Bowel sounds are normal. He exhibits no distension. There is no tenderness.   Musculoskeletal: He exhibits no edema.   There is necrosis of the ends of the right first to fifth toes.  No open areas.   Neurological: He is alert. No cranial nerve deficit.   Decreased sensation in the lower extremities bilateral.   Skin: He is not diaphoretic.   Skin on the legs are dry with some flaking.  But no signs of any infection redness or erythema.   Psychiatric: He has a normal mood and affect. His behavior is normal.       Medication List:  Current Outpatient Prescriptions   Medication Sig     acetaminophen (TYLENOL) 500 MG tablet Take 2 tablets (1,000 mg total) by mouth every 6 (six) hours as needed.     cefuroxime (CEFTIN) 500 MG tablet Take 1 tablet (500 mg total) by mouth 2 (two) times a day with meals for 2 days.     doxycycline (MONODOX) 100 MG capsule Take 1 capsule (100 mg total) by mouth 2 (two) times a day for 2 days.     furosemide (LASIX) 20 MG tablet Take 20 mg by mouth daily.      glipiZIDE (GLUCOTROL) 5 MG tablet Take 0.5 tablets (2.5 mg total) by mouth 2 (two) times a day before meals. Hold if not eating     Eusebio Therapuetic Nutrition 7-7-1.5 gram PwPk Take 1 packet by mouth 2 (two) times a day.     Lactobacillus acidoph-L.bulgar (LACTINEX) 100 million cell packet Take 1 packet by mouth 3 (three) times a day with meals. While  taking antibiotics     lisinopril (PRINIVIL,ZESTRIL) 40 MG tablet Take 0.5 tablets (20 mg total) by mouth daily.     loperamide (IMODIUM) 2 mg capsule Take 1 capsule (2 mg total) by mouth 3 (three) times a day as needed for diarrhea.     metoprolol succinate (TOPROL-XL) 50 MG 24 hr tablet Take 50 mg by mouth 2 (two) times a day.      metroNIDAZOLE (FLAGYL) 500 MG tablet Take 1 tablet (500 mg total) by mouth 3 (three) times a day for 2 days.     nitroglycerin (NITROSTAT) 0.4 MG SL tablet Place 0.4 mg under the tongue every 5 (five) minutes as needed for chest pain.      omeprazole (PRILOSEC) 20 MG capsule Take 20 mg by mouth daily.      simvastatin (ZOCOR) 40 MG tablet Take 40 mg by mouth at bedtime.      warfarin (COUMADIN) 5 MG tablet Take 2.5-5 mg by mouth See Admin Instructions. Take 5 mg on Mon/Fri, and 2.5 mg all other days.  Adjust dose based on INR results as directed.       Labs: Hospital labs were potassium is 5.1 which improved to 4.4 and white count was normal.  INR was 1.74 in the hospital.      Assessment:    ICD-10-CM    1. Partial nontraumatic amputation of foot, left Z89.432    2. Peripheral ischemia I99.8    3. Atrial fibrillation I48.91    4. Anticoagulation management encounter Z51.81     Z79.01    5. Hyperkalemia E87.5        Plan: Plan at this time is to check an INR today and a potassium this week secondary to his high hyperkalemia.  He is stable at this time and will continue wound orders at this time.  He will continue be nonweightbearing and will continue with physical and occupational therapy and blood sugars will be checked 4 times daily while he is here.  His C. difficile toxin was negative at this time however we will continue his antibiotics and no other changes to care plan at this time.  He is showing no signs of chest pain or shortness of breath.  No other changes to care plan at this time.        Electronically signed by: Toni Fisher DO

## 2021-06-14 NOTE — PROGRESS NOTES
Date of Service:12/15/2017    Chief Complaint:   Chief Complaint   Patient presents with     Follow-up     Left foot amp       History:    Ever presents to clinic for evaluation of his foot ulcers.  He has a history that is significant for CAD, diabetes, and venous insufficiency.  He was last seen in clinic by me on 10/4/2017.   On 10/31/2017 he was admitted to the hospital for worsening ulcers. He underwent an angiogram that showed a two vessel runoff with infrapopliteal disease with reconstitution of the dorsalis pedis artery.  Status post left transmetatarsal amputation was performed on 11/5/17.  Surgical pathology showed acute cellulitis, wet and dry gangrene, benign verrucoid keratosis with involvement of cutaneous margin, diminutive microscopic focus of acute osteomyelitis, distal second toe, no evidence of malignancy, viable surgical margin without evidence of osteomyelitis. On 11/9/2017 he was discharged to a TCU on Oral cefuroxime, flagyl, and doxycycline for 6 days per ID. He is now able to do 50% weight bearing on his heal.      It is unclear what the care center is applying to the ulcers.  He brings with him a cream with lanolin in it and reports that it is being used on he right leg daily.  However, he now is starting to weep on his right leg.  He denies any pain in the ulcers or feet.      Current Outpatient Prescriptions   Medication Sig Dispense Refill     acetaminophen (TYLENOL) 500 MG tablet Take 2 tablets (1,000 mg total) by mouth every 6 (six) hours as needed.  0     furosemide (LASIX) 20 MG tablet Take 20 mg by mouth daily.        glipiZIDE (GLUCOTROL) 5 MG tablet Take 0.5 tablets (2.5 mg total) by mouth 2 (two) times a day before meals. Hold if not eating  0     Eusebio Therapuetic Nutrition 7-7-1.5 gram PwPk Take 1 packet by mouth 2 (two) times a day.  0     Lactobacillus acidoph-L.bulgar (LACTINEX) 100 million cell packet Take 1 packet by mouth 3 (three) times a day with meals. While taking  antibiotics  0     lisinopril (PRINIVIL,ZESTRIL) 40 MG tablet Take 0.5 tablets (20 mg total) by mouth daily.  0     loperamide (IMODIUM) 2 mg capsule Take 1 capsule (2 mg total) by mouth 3 (three) times a day as needed for diarrhea.  0     metoprolol succinate (TOPROL-XL) 50 MG 24 hr tablet Take 50 mg by mouth 2 (two) times a day.        multivitamin with minerals (THERA-M) 9 mg iron-400 mcg Tab tablet Take 1 tablet by mouth daily.       nitroglycerin (NITROSTAT) 0.4 MG SL tablet Place 0.4 mg under the tongue every 5 (five) minutes as needed for chest pain.        omeprazole (PRILOSEC) 20 MG capsule Take 20 mg by mouth daily.        simvastatin (ZOCOR) 40 MG tablet Take 40 mg by mouth at bedtime.        warfarin (COUMADIN) 5 MG tablet Take by mouth See Admin Instructions. 12/14/17 INR 2.6. Take 5mg daily. Next INR 12/21 11/27/17 INR 1.9 Cont 5mg qd. Next INR 11/20/  11/22/17 INR 1.4 Increase to 5mg qd. Next INR 11/27  11/15 17 INR 1.8 5mg M & F, 2.5mg all other days.       triamcinolone (KENALOG) 0.1 % cream Apply to the right lower leg daily 60 g 1     No current facility-administered medications for this visit.        Allergies   Allergen Reactions     Latex      Penicillins        Physical Exam:    Vitals:    12/15/17 1039   BP: 106/68   Pulse: 72   Resp: 16   Temp: 97.9  F (36.6  C)    Body mass index is 26.5 kg/(m^2).    General:  72 y.o. male in no apparent distress.    Psychiatric:  Alert and oriented x 3.  Cooperative.   Integumentary:  Skin is uniformly warm, dry and pink.  Lower extremity edema: minimal  Ulcerations:  There is erika wound erythema, no induration, maceration, denudement, fluctuance or warmth surrounding the excoriations. Venous dermatitis.    Labs:    No results found for: SEDRATE  Lab Results   Component Value Date    CRP 8.6 (H) 10/31/2017     No components found for: CREATINE  Lab Results   Component Value Date    BUN 17 11/14/2017     Lab Results   Component Value Date    HGBA1C 6.6 (H)  11/01/2017         Vasc Edema 9/12/2017 9/14/2017 9/21/2017 10/11/2017 12/15/2017   Right just above MTP 23.7 23 25 23.8 23.2   Right Ankle 23.7 22 27 25 25.1   Right Widest Calf 36 36.5 39 35.6 35.1   Right Thigh Up 10cm - - - - -   Left - just above MTP 24.5 23 23 24.4 24.0   Left Ankle 22 22 22 24.5 24.2   Left Widest Calf 35.2 34.5 34 36.4 35.8   Left Thigh Up 10cm - - - - -       See wound flow sheet for wound measurements      Assessment:  1. Leg ulcer, left, limited to breakdown of skin     2. Polyneuropathy associated with underlying disease     3. Acquired lymphedema of lower extremity     4. Type 2 diabetes mellitus with foot ulcer, without long-term current use of insulin     5. Skin ulcer of second toe of right foot, limited to breakdown of skin     6. Venous stasis dermatitis of right lower extremity  triamcinolone (KENALOG) 0.1 % cream       A new wound was identified today: yes, located on the right toe    Plan:  1.  Debridement of the left leg ulcer was recommended.  After consent was obtained and topical 2% Xylocaine was applied under clean conditions, and using a #15 blade,the devitalized dermal tissue was debrided for a total square centimeters of 3.81. Following debridement, there was a decrease in the nonviable tissue. The patient tolerated the procedure without any difficulty.     2.  Right second anterior toe ulcer, new.  Suspect is from his shoe.  Post-surgical boot provided for off loading. No signs of infection    3.  Venous insufficiency with ulceration.  Resolved.    4.  Dermatitis, suspect is from the lanolin.  Discontinued the cream and prescribed TMC.      5.  Diabetes, on Metformin.  No recent A1c found.  Will consider evaluation at his next visit.      6.  Left foot amputation site.  Several small open areas along the incision line.  Will protect and continue to observe. Post-surgical boot was provided per Dr. Wick recommendation.    7.  Treatment:  Will apply adaptic to all of the  ulcers and cover with a dry gauze.  This will be changed daily.    8.  Patient will follow up with a nurse in one week for further evaluation and response to the treatment.     KEVIN Ness, CNP,  [facility]  [phone]

## 2021-06-14 NOTE — PROGRESS NOTES
[facility]    Facility:   [facility] [[id number]]   Code Status: FULL CODE      CHIEF COMPLAINT/REASON FOR VISIT:  Chief Complaint   Patient presents with     Review Of Multiple Medical Conditions       HISTORY:      HPI: Ever Crane is a 72 y.o. male  residing in LTC at [facility].  He has  severe peripheral vascular disease related to his diabetes resulting in transmetatarsal amputation  of the left foot on 17. The incision is mostly healed with a few small open areas along the incision line. NO S/S infection He also has ulcers on the tips of 2-4 th toes on the right and a venous statis rash on his right shin. Wound cares are being done daily and he follows up at the wound clinic. Last visit was 12/15/17. Per nursing he also has poor insight into his diabetes and is a hoarder.  I did notice that he had multiple old food  containers in his room and I did inform him that he needs to clean them  up due to health concerns. He reported that he would but he does not want anyone to touch his stuff.  His BS were elevated and I did increase his glipizide. He denies any pain, SOB, he has no cough or congestion and denies constipation or diarrhea. Last BMP 27 was WNL. Prior to the LTC placement he was living with his son.   Past Medical History:   Diagnosis Date     Atrial flutter      Coronary Artery Disease     CABG x2     Dyslipidemia, goal LDL below 70      Foot ulcer      Hypertension      Neuropathy      Paroxysmal Atrial Fibrillation     Brian Moe: 2011 Cardioversion; CHADS2 = 2 he is on warfarin and sotalol      Type 2 Diabetes Mellitus              Family History   Problem Relation Age of Onset     Sudden death Mother 85     CABG Father      Valvular heart disease Father      Esophageal cancer Father 58      of this     No Medical Problems Son      Social History     Social History     Marital status:      Spouse name: N/A     Number of  children: 1     Years of education: N/A     Occupational History      Self Employed     active     Social History Main Topics     Smoking status: Former Smoker     Types: Cigarettes     Quit date: 4/30/2000     Smokeless tobacco: Never Used     Alcohol use 0.0 oz/week     2 - 3 Glasses of wine, 2 - 3 Cans of beer per week     Drug use: No     Sexual activity: Yes     Other Topics Concern     Not on file     Social History Narrative    Two granddaughters Haven Gipson    Patient Lives with son Raciel  11/2017             Review of Systems   Constitutional: Negative for activity change, appetite change, chills, fatigue and fever.   HENT: Negative for congestion and sore throat.    Eyes: Negative for visual disturbance.   Respiratory: Negative for cough, shortness of breath and wheezing.    Cardiovascular: Positive for leg swelling. Negative for chest pain.   Gastrointestinal: Negative for abdominal distention, abdominal pain, constipation, diarrhea and nausea.   Endocrine:        Type 2 diabetes   Genitourinary: Negative for dysuria.   Musculoskeletal: Negative for arthralgias and myalgias.   Skin: Positive for rash and wound. Negative for color change.   Neurological: Negative for dizziness, weakness and numbness.   Psychiatric/Behavioral: Negative for agitation, behavioral problems, confusion and sleep disturbance.       .  Vitals:    12/18/17 1017   BP: 101/61   Pulse: 88   Resp: 18   Temp: 97.8  F (36.6  C)   SpO2: 98%   Weight: 195 lb (88.5 kg)       Physical Exam   Constitutional: He is oriented to person, place, and time. He appears well-developed and well-nourished.   Pleasant and compliant gentleman in no acute distress   HENT:   Head: Normocephalic.   Eyes: Conjunctivae are normal.   Neck: Normal range of motion.   Cardiovascular: Normal rate.    No murmur heard.  Irregularly irregular with rate controlled.    Pulmonary/Chest: Breath sounds normal. No respiratory distress. He has no wheezes. He  has no rales.   Abdominal: Soft. Bowel sounds are normal. He exhibits no distension. There is no tenderness.   Musculoskeletal: Normal range of motion. He exhibits edema.   Neurological: He is alert and oriented to person, place, and time.   Skin: Skin is warm.   Rash right shin   Ulcer tips of 2-4th digits. Right foot  Transmetatarsal amputation left foot with a few scattered small open areas on surgical incision.    Psychiatric: He has a normal mood and affect. His behavior is normal.         LABS:   12/6/17 [facility] WNL    Current Outpatient Prescriptions   Medication Sig     acetaminophen (TYLENOL) 500 MG tablet Take 2 tablets (1,000 mg total) by mouth every 6 (six) hours as needed.     furosemide (LASIX) 20 MG tablet Take 20 mg by mouth daily.      glipiZIDE (GLUCOTROL) 5 MG tablet Take 0.5 tablets (2.5 mg total) by mouth 2 (two) times a day before meals. Hold if not eating (Patient taking differently: Take 5 mg by mouth 2 (two) times a day before meals. Hold if not eating)     Eusebio Therapuetic Nutrition 7-7-1.5 gram PwPk Take 1 packet by mouth 2 (two) times a day.     lisinopril (PRINIVIL,ZESTRIL) 40 MG tablet Take 0.5 tablets (20 mg total) by mouth daily.     loperamide (IMODIUM) 2 mg capsule Take 1 capsule (2 mg total) by mouth 3 (three) times a day as needed for diarrhea.     metoprolol succinate (TOPROL-XL) 50 MG 24 hr tablet Take 50 mg by mouth 2 (two) times a day.      multivitamin with minerals (THERA-M) 9 mg iron-400 mcg Tab tablet Take 1 tablet by mouth daily.     nitroglycerin (NITROSTAT) 0.4 MG SL tablet Place 0.4 mg under the tongue every 5 (five) minutes as needed for chest pain.      omeprazole (PRILOSEC) 20 MG capsule Take 20 mg by mouth daily.      simvastatin (ZOCOR) 40 MG tablet Take 40 mg by mouth at bedtime.      triamcinolone (KENALOG) 0.1 % cream Apply to the right lower leg daily     warfarin (COUMADIN) 5 MG tablet Take by mouth See Admin Instructions. 12/14/17 INR 2.6. Take 5mg daily. Next  INR 12/21 11/27/17 INR 1.9 Cont 5mg qd. Next INR 11/20/  11/22/17 INR 1.4 Increase to 5mg qd. Next INR 11/27  11/15 17 INR 1.8 5mg M & F, 2.5mg all other days.       ASSESSMENT:      ICD-10-CM    1. Persistent atrial fibrillation I48.1    2. S/P transmetatarsal amputation of foot, left Z89.432    3. Type 2 diabetes mellitus with foot ulcer, without long-term current use of insulin E11.621     L97.509    4. Leg ulcer, left, limited to breakdown of skin L97.921         PLAN:    Left transmetatarsal amputation, right 2-4th digits ulcers on the tips  and right lower extremity rash- continue wound cares as ordered. Pt last seen at the wound clinic 12/15/17  Atrial fibrillation - continue coumadin and dose per INR, rate controlled with metoprolol  Type 2 diabetes. BS elevated- increase glipizide to 5 mg BID  Dyslipidemia on Zocor.  Hypertension stable on metoprolol and lisinopril   Continue to provide a safe and comfortable environment    Electronically signed by: Nandini Camarillo, CNP

## 2021-06-14 NOTE — PROGRESS NOTES
[facility]      NAME:  Ever Crane             :  1945  MRN: [id number]  CODE STATUS:  FULL CODE    VISIT TYPE: DISCHARGE SUMMARY  FACILYTY: [facility] [[id number]]   HOSPITALIZATION:   [place] [facility]  to                 PRIMARY CARE PROVIDER: Sonu Crawford MD    DISCHARGE DIAGNOSIS:      1. Leg ulcer, left, limited to breakdown of skin    2. Type 2 diabetes mellitus         DISCHARGE MEDICATIONS:         Medication List          These changes are accurate as of: 17  2:10 PM.  If you have any questions, ask your nurse or doctor.               CONTINUE taking these medications          acetaminophen 500 MG tablet   Commonly known as:  TYLENOL   Take 2 tablets (1,000 mg total) by mouth every 6 (six) hours as needed.       furosemide 20 MG tablet   Commonly known as:  LASIX       glipiZIDE 5 MG tablet   Commonly known as:  GLUCOTROL   Take 0.5 tablets (2.5 mg total) by mouth 2 (two) times a day before meals. Hold if not eating       Eusebio Therapuetic Nutrition 7-7-1.5 gram Pwpk   Take 1 packet by mouth 2 (two) times a day.       Lactobacillus acidoph-L.bulgar 100 million cell packet   Commonly known as:  LACTINEX   Take 1 packet by mouth 3 (three) times a day with meals. While taking antibiotics       lisinopril 40 MG tablet   Commonly known as:  PRINIVIL,ZESTRIL   Take 0.5 tablets (20 mg total) by mouth daily.       loperamide 2 mg capsule   Commonly known as:  IMODIUM   Take 1 capsule (2 mg total) by mouth 3 (three) times a day as needed for diarrhea.       metoprolol succinate 50 MG 24 hr tablet   Commonly known as:  TOPROL-XL       multivitamin with minerals 9 mg iron-400 mcg Tab tablet   Commonly known as:  THERA-M       nitroglycerin 0.4 MG SL tablet   Commonly known as:  NITROSTAT       omeprazole 20 MG capsule   Commonly known as:  PriLOSEC       simvastatin 40 MG tablet   Commonly known as:  ZOCOR       warfarin 5 MG tablet   Commonly  known as:  COUMADIN             HISTORY OF PRESENT ILLNESS: Ever Crane is a 72 y.o. male will be discharged to a long-term care facility.  He had previously been chose to go as he did not care for it.  Unable to care for himself and he did present to [place] [facility] on 11/13.  On the 14th he was discharged to [facility] TCU for rehab and wound care.  He followed up postoperatively with Dr. Wick and was then admitted to the hospital 11/13/2017.  He did have history of hyper bulimia and is also has atrial fibrillation which has been in good rate control, INR being drawn 11/22 for new current orders. He had a left foot transmetatarsal amputation primary reason he is been in the TCU.  He is to be nonweightbearing on his left foot however he is often noncompliant.  He reports his pain is been controlled and he feels he is doing quite well.  Also has a history of diabetes atrial flutter coronary artery disease he has had a CABG ×2 hypertension and atrial FIb.    SKILLED NURSING FACILITY COURSE:  During this TCU stay, patient completed all anticipated goals of therapy.      PHYSICAL EXAMINATION:    Vitals:    11/21/17 1404   BP: 136/86   Pulse: 71   Temp: 97.1  F (36.2  C)   Weight: 187 lb 3.2 oz (84.9 kg)         GENERAL: Awake, Alert, oriented x3, not in any form of acute distress, answers questions appropriately, follows simple commands, conversant  HEENT: Head is normocephalic with normal hair distribution. No evidence of trauma. Ears: No acute purulent discharge. Eyes: Conjunctivae pink with no scleral jaundice. Nose: Normal mucosa and septum. NECK: Supple with no cervical or supraclavicular lymphadenopathy. Trachea is midline.   CHEST: No tenderness or deformity, no crepitus  LUNG: Clear to auscultation with good chest expansion. There are no crackles or wheezes, normal AP diameter.  BACK: No kyphosis of the thoracic spine. Symmetric, no curvature, ROM normal, no CVA tenderness, no spinal tenderness    CVS: There is good S1  S2, there are no murmurs, rubs, gallops, or heaves, rhythm is irregular, but he is controlled..  ABDOMEN: Globular and soft, nontender to palpation, non distended, no masses, no organomegaly, good bowel sounds, no rebound or guarding, no peritoneal signs.   EXTREMITIES: Recent amputation L .foot transmetatarsal  SKIN: Warm and dry, no erythema noted, no rashes or lesions.  NEUROLOGICAL: The patient is oriented to person, place and time. Strength and sensation are grossly intact. Face is symmetric.      LABS:  All labs reviewed in the nursing home record.        DISCHARGE PLAN: Patient is being discharged to long-term care facility for ongoing care and treatment.  He will need an upcoming INR depending on what his INR is on 11/22/2017.  Coumadin will be adjusted as per INR readings.  Patient will follow up with PCP within 5-10  days after discharge for medication mangagment and appropriate lab studies.          Electronically signed by:  Vero Bailon CNP  This progress note was completed using Dragon software and there may be grammatical errors.      For documentation purposes, chart review, medication management, and discharge coordination of care was greater than 35 minutes

## 2021-06-15 NOTE — PROGRESS NOTES
Date of Service:1/8/2018    Chief Complaint:   Chief Complaint   Patient presents with     Wound Check       History:    Ever presents to clinic for evaluation of his foot ulcers.  He has a history that is significant for CAD, diabetes, and venous insufficiency. Status post left transmetatarsal amputation was performed on 11/5/17 by Dr. DESTINY Wick.   On 11/9/2017 he was discharged to a TCU where he currently resides. He is able to do full weight bearing on his heel, per Dr. DESTINY Mchugh on January 5.     It is unclear what the care center is applying to the ulcers. He reports that his foot ulcer is draining more than it had been, but is not sure how long.  He is wearing his post op boots that he has ducked taped closed over the toe areas to help keep is toes warmer when he went out side. He has minimal feeling in he toes and feet.  He is not sure what is being applied to his toe ulcer. He presents to clinic with gauze on his open areas.     Current Outpatient Prescriptions   Medication Sig Dispense Refill     acetaminophen (TYLENOL) 500 MG tablet Take 2 tablets (1,000 mg total) by mouth every 6 (six) hours as needed.  0     doxycycline (VIBRA-TABS) 100 MG tablet Take 1 tablet (100 mg total) by mouth 2 (two) times a day for 10 days. 20 tablet 0     furosemide (LASIX) 20 MG tablet Take 20 mg by mouth daily.        gentamicin (GARAMYCIN) 0.1 % ointment Apply as instructed to ulcer daily 30 g 1     glipiZIDE (GLUCOTROL) 5 MG tablet Take 0.5 tablets (2.5 mg total) by mouth 2 (two) times a day before meals. Hold if not eating (Patient taking differently: Take 5 mg by mouth 2 (two) times a day before meals. Hold if not eating)  0     Eusebio Therapuetic Nutrition 7-7-1.5 gram PwPk Take 1 packet by mouth 2 (two) times a day.  0     lisinopril (PRINIVIL,ZESTRIL) 40 MG tablet Take 0.5 tablets (20 mg total) by mouth daily.  0     loperamide (IMODIUM) 2 mg capsule Take 1 capsule (2 mg total) by mouth 3 (three) times a day as needed  for diarrhea.  0     metoprolol succinate (TOPROL-XL) 50 MG 24 hr tablet Take 50 mg by mouth 2 (two) times a day.        multivitamin with minerals (THERA-M) 9 mg iron-400 mcg Tab tablet Take 1 tablet by mouth daily.       mupirocin (BACTROBAN) 2 % ointment Apply as instructed to ulcer daily 30 g 1     nitroglycerin (NITROSTAT) 0.4 MG SL tablet Place 0.4 mg under the tongue every 5 (five) minutes as needed for chest pain.        omeprazole (PRILOSEC) 20 MG capsule Take 20 mg by mouth daily.        simvastatin (ZOCOR) 40 MG tablet Take 40 mg by mouth at bedtime.        triamcinolone (KENALOG) 0.1 % cream Apply to the right lower leg daily 60 g 1     warfarin (COUMADIN) 5 MG tablet Take by mouth See Admin Instructions. 12/14/17 INR 2.6. Take 5mg daily. Next INR 12/21 11/27/17 INR 1.9 Cont 5mg qd. Next INR 11/20/  11/22/17 INR 1.4 Increase to 5mg qd. Next INR 11/27  11/15 17 INR 1.8 5mg M & F, 2.5mg all other days.       No current facility-administered medications for this visit.        Allergies   Allergen Reactions     Latex      Penicillins        Physical Exam:    Vitals:    01/08/18 1016   BP: 112/72   Pulse: 69   Temp: 98.1  F (36.7  C)    There is no height or weight on file to calculate BMI.    General:  72 y.o. male in no apparent distress.    Psychiatric:  Alert and oriented x 3.  Cooperative.   Integumentary:  Skin is uniformly warm except his foot and toes are cooler to touch compared to his legs, dry and pink.  Lower extremity edema: moderate  Ulcerations:  There is erika wound erythema, but no induration, maceration, denudement, fluctuance or warmth surrounding the excoriations. Wound base is 100% granular, wound edges are intact, but irritated and breaking down on his left foot ulcers, especially the medial ulcer. There is moderate serous exudate. No tunneling or undermining noted.   Venous dermatitis on his legs    Labs:    No results found for: SEDRATE  Lab Results   Component Value Date    CRP 8.6  (H) 10/31/2017     No components found for: CREATINE  Lab Results   Component Value Date    BUN 17 11/14/2017     Lab Results   Component Value Date    HGBA1C 6.6 (H) 11/01/2017         Vasc Edema 9/21/2017 10/11/2017 12/15/2017 12/22/2017 1/8/2018   Right just above MTP 25 23.8 23.2 25.4 27   Right Ankle 27 25 25.1 26.8 26   Right Widest Calf 39 35.6 35.1 40 35.5   Right Thigh Up 10cm - - - - -   Left - just above MTP 23 24.4 24.0 28 29.5   Left Ankle 22 24.5 24.2 25.3 24.8   Left Widest Calf 34 36.4 35.8 36.3 36.5   Left Thigh Up 10cm - - - - -       Wound 12/01/17 right 2nd toe (Active)   Pre Size Length 0.4 1/8/2018 10:00 AM   Pre Size Width 0.2 1/8/2018 10:00 AM   Pre Size Depth 0.1 1/8/2018 10:00 AM   Pre Total Sq cm 0.96 12/22/2017  9:00 AM   Post Size Length 1.3 12/15/2017 10:00 AM   Post Size Width 1 12/15/2017 10:00 AM       Wound 12/15/17 Right 3rd toe (Active)   Pre Size Length 0.5 1/8/2018 10:00 AM   Pre Size Width 0.5 1/8/2018 10:00 AM   Pre Size Depth 0.1 1/8/2018 10:00 AM   Pre Total Sq cm 1.2 12/22/2017  9:00 AM       Wound 12/22/17 R 4th digit (Active)   Pre Size Length 0.6 1/8/2018 10:00 AM   Pre Size Width 0.8 1/8/2018 10:00 AM   Pre Size Depth 0.1 1/8/2018 10:00 AM   Pre Total Sq cm 0.6 12/22/2017  9:00 AM       Wound 12/22/17 L amp medial (Active)   Pre Size Length 1.5 1/8/2018 10:00 AM   Pre Size Width 2 1/8/2018 10:00 AM   Pre Size Depth 0.1 12/22/2017  9:00 AM   Pre Total Sq cm 0.75 1/8/2018 10:00 AM   Post Size Length 1.2 1/8/2018 10:00 AM   Post Size Width 1.5 1/8/2018 10:00 AM   Post Size Depth 0.2 1/8/2018 10:00 AM       Wound 12/22/17 L amp closest to medial (Active)   Pre Size Length 0.2 1/8/2018 10:00 AM   Pre Size Width 0.4 1/8/2018 10:00 AM   Pre Size Depth 0.1 12/22/2017  9:00 AM   Pre Total Sq cm 0.2 12/22/2017  9:00 AM           Assessment:  1. Local infection of wound  doxycycline (VIBRA-TABS) 100 MG tablet    mupirocin (BACTROBAN) 2 % ointment    gentamicin (GARAMYCIN) 0.1 %  ointment   2. Ulcer of foot, limited to breakdown of skin, unspecified laterality     3. Polyneuropathy associated with underlying disease     4. Type 2 diabetes mellitus with foot ulcer, without long-term current use of insulin     5. PAD (peripheral artery disease)     6. Ischemic cardiomyopathy     7. Skin ulcer of toe of right foot, limited to breakdown of skin         A new wound was identified today: yes, located on the second toe.  Plan:  1.  Debridement of the left leg ulcer was recommended.  After consent was obtained and topical 2% Xylocaine was applied under clean conditions, and using a #15 blade,the devitalized dermal tissue was debrided for a total square centimeters of 3.81. Following debridement, there was a decrease in the nonviable tissue. The patient tolerated the procedure without any difficulty.     2.  Right second anterior toe ulcer, new. No signs of infection.    3.  Right third toe ulcer, no signs of infection.    4.  Right first toe ulcer, no signs of infection.    5.  Left foot ulceration.  Ulcer is worse.  There are signs of infection.  I wrote a prescription for Doxycycline.  I obtained an aerobic culture and sensitivity today.    6.  Venous insufficiency with ulceration.  Resolved, but edema persists    4.  Dermatitis, stable    5.  Edema, stable.  Will continue with the Tubigrip secondary to his known PAD.  He will elevate his legs 2 hours in the morning and 2 hours in the afternoon to help with decreasing edema.    6.  Diabetes, on Metformin.  6.6 A1c on 11/1/2017.  Will consider evaluation at his next visit.      7.  Treatment:  Will apply adaptic to all of the ulcers and cover with a dry gauze.  This will be changed daily.  Will continue the TMC cream to the dermatitis and start Atractain to moisturize his dry skin. Will continue with tubigrip to help reduce the swelling.  I am reluctant to apply a higher level of compression secondary to his PAD.  He will elevate his feet in bed  two hours in the am and two hours in the pm.    8.  Patient will follow up with me in 2-3 weeks for further evaluation and response to the treatment.     Miriam Quinones, APRN, CNP,  [facility]  [phone]

## 2021-06-15 NOTE — PROGRESS NOTES
[facility]    DATE: 2018    NAME:  Ever Crane             :  1945  MRN: [id number]  CODE STATUS:  FULL CODE    VISIT TYPE: Problem Visit (rash)     FACILITY:  [facility] [[id number]]       CHIEF COMPLAIN/REASON FOR VISIT:    Chief Complaint   Patient presents with     Problem Visit     rash               HISTORY OF PRESENT ILLNESS: Ever Crane is a 72 y.o. male who is a long term care resident of [facility]. He has PMH of PVD with transmetetarsal amputation of left foot on , Type 2 DM, venous stasis ulcers, Atrial flutter, CAD, CABG x2, HLD, HTN, neuropathy, PAF.     Today Mr. Crane states his rash is starting to come back around his scrotum. He says it is starting to itch a little again and seems moist down there but he is not sure why. He was using the nystatin cream but it was getting on his bed and clothes so he admits he has not been using it as much recently. He started on the powder last time so he doesn't think that works for him because he had to switch to the cream. He doesn't know why this keeps coming back. He says otherwise he knows he is gaining weight but admits he does not eat well. He eats a lot of junk food especially at night. He will sit in the lounge or in his room and watch tv late at night and have snacks. He says the swelling in his legs is about the same and shortness of breath is doing ok. He doesn't have much desire to eat healthy since he already has a lot of medical problems.     REVIEW OF SYSTEMS:  PROBLEMS AND REVIEW OF SYSTEMS:   Review of Systems  Today on ROS:   Currently, no fever, chills, or rigors. Does not have any visual or hearing problems. Denies any chest pain, headaches, palpitations, lightheadedness, dizziness. Appetite is good. Denies any GERD symptoms. Denies any difficulty with swallowing, nausea, or vomiting.  Denies any abdominal pain, diarrhea or constipation. Denies any urinary symptoms.  No insomnia. No active bleeding. Wounds on feet/legs, denies sob today, positive for leg edema, perineal rash, muscular side and chest pain at times      Allergies   Allergen Reactions     Latex      Penicillins      Current Outpatient Prescriptions   Medication Sig     acetaminophen (TYLENOL) 500 MG tablet Take 2 tablets (1,000 mg total) by mouth every 6 (six) hours as needed.     albuterol (PROVENTIL) 2.5 mg /3 mL (0.083 %) nebulizer solution Take 3 mL (2.5 mg total) by nebulization every 6 (six) hours as needed for wheezing or shortness of breath.     dextromethorphan-guaiFENesin (ROBITUSSIN-DM)  mg/5 mL liquid Take 10 mL by mouth every 4 (four) hours as needed (cough).     furosemide (LASIX) 40 MG tablet Take 0.5 tablets (20 mg total) by mouth 2 (two) times a day at 9am and 6pm.     furosemide (LASIX) 40 MG tablet Take 0.5 tablets (20 mg total) by mouth daily as needed (Extra dose at noon eache day if weight gain> 2lbs in 24 hours).     gentamicin (GARAMYCIN) 0.1 % ointment Apply as instructed to ulcer daily     glipiZIDE (GLUCOTROL) 5 MG tablet Take 5 mg by mouth 2 (two) times a day before meals.     Eusebio Therapuetic Nutrition 7-7-1.5 gram PwPk Take 1 packet by mouth daily.     loperamide (IMODIUM) 2 mg capsule Take 1 capsule (2 mg total) by mouth 3 (three) times a day as needed for diarrhea.     metoprolol succinate (TOPROL-XL) 50 MG 24 hr tablet Take 50 mg by mouth 2 (two) times a day.      multivitamin with minerals (THERA-M) 9 mg iron-400 mcg Tab tablet Take 1 tablet by mouth daily.     mupirocin (BACTROBAN) 2 % ointment Apply as instructed to ulcer daily     nitroglycerin (NITROSTAT) 0.4 MG SL tablet Place 0.4 mg under the tongue every 5 (five) minutes as needed for chest pain.      omeprazole (PRILOSEC) 20 MG capsule Take 20 mg by mouth daily.      simvastatin (ZOCOR) 40 MG tablet Take 40 mg by mouth at bedtime.      spironolactone (ALDACTONE) 25 MG tablet Take 1 tablet (25 mg total) by mouth  daily.     urea-alpha hydroxy acids (ATRAC-TAIN, WITH AHA,) 10-4 % Crea Apply 1 application topically daily. Apply after washing legs & feet with water     WARFARIN SODIUM (WARFARIN ORAL) Take by mouth. 2/1/18 INR 2.2  Take 2.5mg daily.  Next INR 2/8/18.     Past Medical History:    Past Medical History:   Diagnosis Date     Atrial flutter      Coronary Artery Disease     CABG x2     Diabetic ulcer of both feet 10/31/2017     Dyslipidemia, goal LDL below 70      Foot ulcer      Gangrene of left foot      Hypertension      Neuropathy      Paroxysmal Atrial Fibrillation     Brian Moe: 8/2011 Cardioversion; CHADS2 = 2 he is on warfarin and sotalol      Type 2 Diabetes Mellitus            PHYSICAL EXAMINATION  Vitals:    02/05/18 1349   BP: 111/73   Pulse: 68   Resp: 18   Temp: 96.8  F (36  C)   SpO2: 96%     Today on physical exam:      GENERAL: Awake, Alert, oriented x3, not in any form of acute distress, answers questions appropriately, follows simple commands, conversant  HEENT: Head is normocephalic with normal hair distribution. No evidence of trauma. Ears: No acute purulent discharge. Eyes: Conjunctivae pink with no scleral jaundice. Nose: Normal mucosa and septum. NECK: Supple with no cervical or supraclavicular lymphadenopathy. Trachea is midline.   CHEST: No tenderness or deformity, no crepitus  LUNG: dim to auscultation with good chest expansion. There are no crackles or wheezes, normal AP diameter.  BACK: No kyphosis of the thoracic spine. Symmetric, no curvature, ROM normal, no CVA tenderness, no spinal tenderness   CVS: irregularly irregular, rhythm, there are no murmurs, rubs, gallops, or heaves,  2+ pulses symmetric in all extremities.  ABDOMEN: Rounded and soft, nontender to palpation, non distended, no masses, no organomegaly, good bowel sounds, no rebound or guarding, no peritoneal signs.   EXTREMITIES: 2+ ble pedal edema, denisse discoloration ble, transmetatarsal amputation left foot, scattered  venous stasis ulcers  SKIN: Warm and dry, no erythema noted.  Skin color, texture,perineal rash maculopapular   NEUROLOGICAL: The patient is oriented to person, place and time. Strength and sensation are grossly intact. Face is symmetric.Irritable at times, noncompliant at times. Irrational judgement at times.            LABS:   Recent Results (from the past 168 hour(s))   Basic Metabolic Panel   Result Value Ref Range    Sodium 140 136 - 145 mmol/L    Potassium 4.0 3.5 - 5.0 mmol/L    Chloride 103 98 - 107 mmol/L    CO2 29 22 - 31 mmol/L    Anion Gap, Calculation 8 5 - 18 mmol/L    Glucose 147 (H) 70 - 125 mg/dL    Calcium 9.1 8.5 - 10.5 mg/dL    BUN 52 (H) 8 - 28 mg/dL    Creatinine 1.38 (H) 0.70 - 1.30 mg/dL    GFR MDRD Af Amer >60 >60 mL/min/1.73m2    GFR MDRD Non Af Amer 51 (L) >60 mL/min/1.73m2     Results for orders placed or performed in visit on 01/30/18   Basic Metabolic Panel   Result Value Ref Range    Sodium 140 136 - 145 mmol/L    Potassium 4.0 3.5 - 5.0 mmol/L    Chloride 103 98 - 107 mmol/L    CO2 29 22 - 31 mmol/L    Anion Gap, Calculation 8 5 - 18 mmol/L    Glucose 147 (H) 70 - 125 mg/dL    Calcium 9.1 8.5 - 10.5 mg/dL    BUN 52 (H) 8 - 28 mg/dL    Creatinine 1.38 (H) 0.70 - 1.30 mg/dL    GFR MDRD Af Amer >60 >60 mL/min/1.73m2    GFR MDRD Non Af Amer 51 (L) >60 mL/min/1.73m2         Lab Results   Component Value Date    WBC 7.3 01/25/2018    HGB 11.5 (L) 01/25/2018    HCT 35.2 (L) 01/25/2018    MCV 83 01/25/2018     01/25/2018       No results found for: [id number]  Lab Results   Component Value Date    HGBA1C 6.6 (H) 11/01/2017     Lab Results   Component Value Date    INR 1.72 (H) 01/28/2018    INR 1.98 (H) 01/27/2018    INR 2.13 (H) 01/26/2018     No results found for: [id number]  No results found for: TSH        ASSESSMENT/PLAN:    1. Candidiasis of perineal region: Nystatin cream. Discussed with pt at length and will scheduled nystatin powder in am and cream at bedside. May use either  prn during day. He did not want cream during day because gets on clothes, bedding etc. He did not want just powder because thinks it didn't work for him last time. Staff was ordering jock strap as well to see if this would help prevent.   2. Left transmetatarsal amputation, severe PVD, venous stasis ulcers: Continues to follow with vascular clinic, next visit on 2/8. Continue wound care orders per clinic.   3. Type 2 DM: -228. Continue glipizide. Noncompliant with diet.   4. CAD, CABG: On metoprolol, nitrostat, simvastatin.   5. Atrial flutter: On metoprolol, rate controlled. On warfarin.   6. HTN: SBP 110s. Stable on lasix, metoprolol, lisinopril 10.   7. IBS: Imodium prn.   8. Acute on chronic CHF: Daily weights [phone] lbs. On lasix, continue LUKE hose/tubigrips. Recently hospitalized for. Spironolactone was added and lasix increased to bid.  Cr on 1/29 1.58, 1.42 on 1/28 (previous at [facility] was 0.96 prior to hospital stay). Cr 1/30 was 1.38. F/u with CHF clinic in 2 weeks. Changed lasix today to 40mg qam and 20mg po qnoon.   9. MESHA: Resumed lisinopril, improving Cr 1.3. Monitor. BMP next Monday 2/5.   10. Muscular pain: Across chest and sides, worse with palpation says stretching and laying down help. H/o . Offered ice and to order aspercreme, pt states he will tough it out as long as he knows it isn't his heart. Workup in hospital was negative for cardiac cause. No further issues over weekend.           Electronically signed by: Preeti Conde NP    Total 35 minutes of which 75% was spent in counseling and coordination of care of the above plan    Time spent in interview and examination of patient, review of available records, and discussion with nursing staff. Continue care plan, efforts at therapy, and monitor nutritional status.

## 2021-06-15 NOTE — PROGRESS NOTES
Pt is post op L trans amputation on 11/5. Following with Miriam Early for wound care. Pt has small open areas along incision line and new open areas on right 2nd and 3rd toe.

## 2021-06-15 NOTE — PROGRESS NOTES
[facility]    DATE: 2018    NAME:  Ever Crane             :  1945  MRN: [id number]  CODE STATUS:  FULL CODE    VISIT TYPE: Review Of Multiple Medical Conditions     FACILITY:  [facility] [[id number]]       CHIEF COMPLAIN/REASON FOR VISIT:    Chief Complaint   Patient presents with     Review Of Multiple Medical Conditions               HISTORY OF PRESENT ILLNESS: Ever Crane is a 72 y.o. male who is a long term care resident of [facility]. He has PMH of PVD with transmetetarsal amputation of left foot on , Type 2 DM, venous stasis ulcers, Atrial flutter, CAD, CABG x2, HLD, HTN, neuropathy, PAF. He was admitted on  for CHF exacerbation and also treated for MESHA. He had been sent by vascular clinic for complaints of chest pain.     Today Mr. Crane states his jock itch is much improved from before and he is feeling a lot better. He says he is not short of breath today and the swelling in his legs is improved from before. He says they have not weighed him yet today and he had breakfast already. He thinks he will go get weighed prior to lunch. He says he feels much better than when he was in the hospital and has not had any breathing problems. He gets pains at times on his sides and across his chest. He says he used to be a  and wound spend hours in odd positions and this would cause him to have pain. He says he wants to know if this pain is related to his heart or not. He says he is sleeping well and has a good appetite. Per nursing weight right before lunch was 189lbs, up 2 lbs from yesterday but was not weighed in am like was scheduled to.      REVIEW OF SYSTEMS:  PROBLEMS AND REVIEW OF SYSTEMS:   Review of Systems  Today on ROS:   Currently, no fever, chills, or rigors. Does not have any visual or hearing problems. Denies any chest pain, headaches, palpitations, lightheadedness, dizziness. Appetite is good. Denies any GERD  symptoms. Denies any difficulty with swallowing, nausea, or vomiting.  Denies any abdominal pain, diarrhea or constipation. Denies any urinary symptoms. No insomnia. No active bleeding. Wounds on feet/legs, denies sob today, positive for leg edema, perineal candidiasis improving, muscular side and chest pain at times      Allergies   Allergen Reactions     Latex      Penicillins      Current Outpatient Prescriptions   Medication Sig     acetaminophen (TYLENOL) 500 MG tablet Take 2 tablets (1,000 mg total) by mouth every 6 (six) hours as needed.     albuterol (PROVENTIL) 2.5 mg /3 mL (0.083 %) nebulizer solution Take 3 mL (2.5 mg total) by nebulization every 6 (six) hours as needed for wheezing or shortness of breath.     dextromethorphan-guaiFENesin (ROBITUSSIN-DM)  mg/5 mL liquid Take 10 mL by mouth every 4 (four) hours as needed (cough).     furosemide (LASIX) 40 MG tablet Take 0.5 tablets (20 mg total) by mouth 2 (two) times a day at 9am and 6pm.     furosemide (LASIX) 40 MG tablet Take 0.5 tablets (20 mg total) by mouth daily as needed (Extra dose at noon eache day if weight gain> 2lbs in 24 hours).     gentamicin (GARAMYCIN) 0.1 % ointment Apply as instructed to ulcer daily     glipiZIDE (GLUCOTROL) 5 MG tablet Take 5 mg by mouth 2 (two) times a day before meals.     Eusebio Therapuetic Nutrition 7-7-1.5 gram PwPk Take 1 packet by mouth daily.     loperamide (IMODIUM) 2 mg capsule Take 1 capsule (2 mg total) by mouth 3 (three) times a day as needed for diarrhea.     metoprolol succinate (TOPROL-XL) 50 MG 24 hr tablet Take 50 mg by mouth 2 (two) times a day.      multivitamin with minerals (THERA-M) 9 mg iron-400 mcg Tab tablet Take 1 tablet by mouth daily.     mupirocin (BACTROBAN) 2 % ointment Apply as instructed to ulcer daily     nitroglycerin (NITROSTAT) 0.4 MG SL tablet Place 0.4 mg under the tongue every 5 (five) minutes as needed for chest pain.      nystatin (MYCOSTATIN) ointment Apply 1 application  topically 2 (two) times a day. To red groin x 10 days (started 1/24)     omeprazole (PRILOSEC) 20 MG capsule Take 20 mg by mouth daily.      simvastatin (ZOCOR) 40 MG tablet Take 40 mg by mouth at bedtime.      spironolactone (ALDACTONE) 25 MG tablet Take 1 tablet (25 mg total) by mouth daily.     urea-alpha hydroxy acids (ATRAC-TAIN, WITH AHA,) 10-4 % Crea Apply 1 application topically daily. Apply after washing legs & feet with water     warfarin (COUMADIN) 2 MG tablet Take 2 mg by mouth daily.     Past Medical History:    Past Medical History:   Diagnosis Date     Atrial flutter      Coronary Artery Disease     CABG x2     Diabetic ulcer of both feet 10/31/2017     Dyslipidemia, goal LDL below 70      Foot ulcer      Gangrene of left foot      Hypertension      Neuropathy      Paroxysmal Atrial Fibrillation     MoeBrian louie: 8/2011 Cardioversion; CHADS2 = 2 he is on warfarin and sotalol      Type 2 Diabetes Mellitus            PHYSICAL EXAMINATION  Vitals:    01/30/18 2149   BP: 111/58   Pulse: 67   Resp: 18   Temp: 97  F (36.1  C)   SpO2: 93%     Today on physical exam:      GENERAL: Awake, Alert, oriented x3, not in any form of acute distress, answers questions appropriately, follows simple commands, conversant  HEENT: Head is normocephalic with normal hair distribution. No evidence of trauma. Ears: No acute purulent discharge. Eyes: Conjunctivae pink with no scleral jaundice. Nose: Normal mucosa and septum. NECK: Supple with no cervical or supraclavicular lymphadenopathy. Trachea is midline.   CHEST: No tenderness or deformity, no crepitus  LUNG: dim to auscultation with good chest expansion. There are no crackles or wheezes, normal AP diameter.  BACK: No kyphosis of the thoracic spine. Symmetric, no curvature, ROM normal, no CVA tenderness, no spinal tenderness   CVS: irregularly irregular, rhythm, there are no murmurs, rubs, gallops, or heaves,  2+ pulses symmetric in all extremities.  ABDOMEN: Rounded and  soft, nontender to palpation, non distended, no masses, no organomegaly, good bowel sounds, no rebound or guarding, no peritoneal signs.   EXTREMITIES: 2+ ble pedal edema, denisse discoloration ble, transmetatarsal amputation left foot, scattered venous stasis ulcers  SKIN: Warm and dry, no erythema noted.  Skin color, texture, no rashes or lesions.  NEUROLOGICAL: The patient is oriented to person, place and time. Strength and sensation are grossly intact. Face is symmetric.Irritable at times, noncompliant at times. Irrational judgement at times.            LABS:   Recent Results (from the past 168 hour(s))   ECG 12 lead nursing unit performed   Result Value Ref Range    SYSTOLIC BLOOD PRESSURE  mmHg    DIASTOLIC BLOOD PRESSURE  mmHg    VENTRICULAR RATE 74 BPM    ATRIAL RATE 300 BPM    P-R INTERVAL  ms    QRS DURATION 96 ms    Q-T INTERVAL 418 ms    QTC CALCULATION (BEZET) 463 ms    P Axis  degrees    R AXIS 43 degrees    T AXIS 93 degrees    MUSE DIAGNOSIS       Atrial flutter with variable A-V block  Nonspecific ST and T wave abnormality  Minimal criteria for Prolonged QT  Abnormal ECG  When compared with ECG of 31-OCT-2017 23:08,  Non-specific change in ST segment in Lateral leads  Confirmed by KANDACE VIRK MD LOC: ([id number]) on 1/25/2018 4:57:40 PM     HM2(CBC w/o Differential)   Result Value Ref Range    WBC 7.3 4.0 - 11.0 thou/uL    RBC 4.22 (L) 4.40 - 6.20 mill/uL    Hemoglobin 11.5 (L) 14.0 - 18.0 g/dL    Hematocrit 35.2 (L) 40.0 - 54.0 %    MCV 83 80 - 100 fL    MCH 27.3 27.0 - 34.0 pg    MCHC 32.7 32.0 - 36.0 g/dL    RDW 15.8 (H) 11.0 - 14.5 %    Platelets 156 140 - 440 thou/uL    MPV 10.1 8.5 - 12.5 fL   Basic Metabolic Panel   Result Value Ref Range    Sodium 141 136 - 145 mmol/L    Potassium 4.6 3.5 - 5.0 mmol/L    Chloride 107 98 - 107 mmol/L    CO2 25 22 - 31 mmol/L    Anion Gap, Calculation 9 5 - 18 mmol/L    Glucose 118 70 - 125 mg/dL    Calcium 9.4 8.5 - 10.5 mg/dL    BUN 40 (H) 8 - 28 mg/dL     Creatinine 1.51 (H) 0.70 - 1.30 mg/dL    GFR MDRD Af Amer 55 (L) >60 mL/min/1.73m2    GFR MDRD Non Af Amer 46 (L) >60 mL/min/1.73m2   Troponin I   Result Value Ref Range    Troponin I <0.01 0.00 - 0.29 ng/mL   D-dimer, Quantitative   Result Value Ref Range    D-Dimer, Quant 0.56 (H) <=0.50 FEU ug/mL   INR   Result Value Ref Range    INR 2.29 (H) 0.90 - 1.10   BNP(B-type Natriuretic Peptide)   Result Value Ref Range     (H) 0 - 70 pg/mL   Echo Complete   Result Value Ref Range    LV volume systolic 46.9 21 - 61 cm3    LV volume diastolic 104 62 - 150 cm3    LVOT peak VTI 18.5 cm    LV PWd 1.22 0.6 - 1.0 cm    LVOT mean leanne 61.3 cm/s    MV E' med leanne 8.06 cm/s    LVIDs 4.26 2.5 - 4.0 cm    LVOT diam 2.05 cm    LVOT mean gradient 1.79 mmHg    LVIDd 5.35 4.2 - 5.8 cm    IVSd 1.13 0.6 - 1.0 cm    MV E' lat leanne 7.96 cm/s    LVOT peak gradient 3.80 mmHg    LA size 4.71 cm    LA/AO root ratio 1.68 no units    AR p 1/2 time 576 ms    MV peak E leanne 107 cm/s    MV decel time 0.193 s    WI peak gradient 18.4 mmHg    WI peak leanne 214 cm/s    AO root 2.81 cm    BSA 2.14 m2    Hieght 72 in    Weight 3184 lbs    /96 mmHg    HR 72 bpm    IVS/PW ratio 0.9     TR peak gradent 37.0 mmHg    LV FS 20.4 28 - 44 %    Echo LVEF calculated 55 55 - 75 %    LA volume 91.4 mL    LV mass 253.0 g    TR peak leanne 304.0 cm/s    LVOT area 3.30 cm2    LVOT SV 61.0 cm3    LV systolic volume index 21.9 11 - 31 cm3/m2    LV diastolic volume index 48.6 34 - 74 cm3/m2    LA volume index 42.7 mL/m2    LV mass index 118.2 g/m2    LV SVi 28.5 ml/m2    TAPSE 1.6 cm    MV med E/e' ratio 13.3     MV lat E/e' ratio 13.4     LV CO 4.4 l/min    LV Ci 2.1 l/min/m2    LA area 2 24 cm2    LA area 1 26 cm2    Height 72.0 in    Weight 199 lbs    MV Avg E/e' Ratio 13.4 cm/s    LA length 5.8 cm   POCT Glucose   Result Value Ref Range    Glucose,  mg/dL   Troponin I   Result Value Ref Range    Troponin I <0.01 0.00 - 0.29 ng/mL   POCT Glucose   Result  Value Ref Range    Glucose,  mg/dL   Urinalysis-UC if Indicated   Result Value Ref Range    Color, UA Yellow Colorless, Yellow, Straw, Light Yellow    Clarity, UA Clear Clear    Glucose, UA Negative Negative    Bilirubin, UA Negative Negative    Ketones, UA Negative Negative, 60 mg/dL    Specific Gravity, UA 1.009 1.001 - 1.030    Blood, UA Moderate (!) Negative    pH, UA 5.0 4.5 - 8.0    Protein, UA Negative Negative mg/dL    Urobilinogen, UA <2.0 E.U./dL <2.0 E.U./dL, 2.0 E.U./dL    Nitrite, UA Negative Negative    Leukocytes, UA Negative Negative    Bacteria, UA None Seen None Seen hpf    RBC, UA 10-25 (!) None Seen, 0-2 hpf    WBC, UA 5-10 (!) None Seen, 0-5 hpf    Squam Epithel, UA 0-5 None Seen, 0-5 lpf   Culture, Urine   Result Value Ref Range    Culture No Growth    Troponin I   Result Value Ref Range    Troponin I <0.01 0.00 - 0.29 ng/mL   INR   Result Value Ref Range    INR 2.13 (H) 0.90 - 1.10   Basic Metabolic Panel   Result Value Ref Range    Sodium 141 136 - 145 mmol/L    Potassium 4.0 3.5 - 5.0 mmol/L    Chloride 107 98 - 107 mmol/L    CO2 24 22 - 31 mmol/L    Anion Gap, Calculation 10 5 - 18 mmol/L    Glucose 99 70 - 125 mg/dL    Calcium 8.9 8.5 - 10.5 mg/dL    BUN 37 (H) 8 - 28 mg/dL    Creatinine 1.37 (H) 0.70 - 1.30 mg/dL    GFR MDRD Af Amer >60 >60 mL/min/1.73m2    GFR MDRD Non Af Amer 51 (L) >60 mL/min/1.73m2   POCT Glucose   Result Value Ref Range    Glucose,  mg/dL   POCT Glucose   Result Value Ref Range    Glucose, POC 79 mg/dL   POCT Glucose   Result Value Ref Range    Glucose, POC 99 mg/dL   POCT Glucose   Result Value Ref Range    Glucose,  mg/dL   INR   Result Value Ref Range    INR 1.98 (H) 0.90 - 1.10   POCT Glucose   Result Value Ref Range    Glucose, POC 99 mg/dL   Basic Metabolic Panel   Result Value Ref Range    Sodium 139 136 - 145 mmol/L    Potassium 4.2 3.5 - 5.0 mmol/L    Chloride 103 98 - 107 mmol/L    CO2 23 22 - 31 mmol/L    Anion Gap, Calculation 13 5 -  18 mmol/L    Glucose 101 70 - 125 mg/dL    Calcium 9.7 8.5 - 10.5 mg/dL    BUN 33 (H) 8 - 28 mg/dL    Creatinine 1.36 (H) 0.70 - 1.30 mg/dL    GFR MDRD Af Amer >60 >60 mL/min/1.73m2    GFR MDRD Non Af Amer 52 (L) >60 mL/min/1.73m2   POCT Glucose   Result Value Ref Range    Glucose,  mg/dL   POCT Glucose   Result Value Ref Range    Glucose, POC 85 mg/dL   POCT Glucose   Result Value Ref Range    Glucose,  mg/dL   INR   Result Value Ref Range    INR 1.72 (H) 0.90 - 1.10   Basic Metabolic Panel   Result Value Ref Range    Sodium 137 136 - 145 mmol/L    Potassium 3.5 3.5 - 5.0 mmol/L    Chloride 100 98 - 107 mmol/L    CO2 28 22 - 31 mmol/L    Anion Gap, Calculation 9 5 - 18 mmol/L    Glucose 97 70 - 125 mg/dL    Calcium 9.5 8.5 - 10.5 mg/dL    BUN 35 (H) 8 - 28 mg/dL    Creatinine 1.42 (H) 0.70 - 1.30 mg/dL    GFR MDRD Af Amer 59 (L) >60 mL/min/1.73m2    GFR MDRD Non Af Amer 49 (L) >60 mL/min/1.73m2   POCT Glucose   Result Value Ref Range    Glucose,  mg/dL   POCT Glucose   Result Value Ref Range    Glucose,  mg/dL   Basic Metabolic Panel   Result Value Ref Range    Sodium 137 136 - 145 mmol/L    Potassium 4.0 3.5 - 5.0 mmol/L    Chloride 99 98 - 107 mmol/L    CO2 27 22 - 31 mmol/L    Anion Gap, Calculation 11 5 - 18 mmol/L    Glucose 150 (H) 70 - 125 mg/dL    Calcium 9.4 8.5 - 10.5 mg/dL    BUN 49 (H) 8 - 28 mg/dL    Creatinine 1.58 (H) 0.70 - 1.30 mg/dL    GFR MDRD Af Amer 53 (L) >60 mL/min/1.73m2    GFR MDRD Non Af Amer 43 (L) >60 mL/min/1.73m2   Basic Metabolic Panel   Result Value Ref Range    Sodium 140 136 - 145 mmol/L    Potassium 4.0 3.5 - 5.0 mmol/L    Chloride 103 98 - 107 mmol/L    CO2 29 22 - 31 mmol/L    Anion Gap, Calculation 8 5 - 18 mmol/L    Glucose 147 (H) 70 - 125 mg/dL    Calcium 9.1 8.5 - 10.5 mg/dL    BUN 52 (H) 8 - 28 mg/dL    Creatinine 1.38 (H) 0.70 - 1.30 mg/dL    GFR MDRD Af Amer >60 >60 mL/min/1.73m2    GFR MDRD Non Af Amer 51 (L) >60 mL/min/1.73m2     Results for  orders placed or performed in visit on 01/30/18   Basic Metabolic Panel   Result Value Ref Range    Sodium 140 136 - 145 mmol/L    Potassium 4.0 3.5 - 5.0 mmol/L    Chloride 103 98 - 107 mmol/L    CO2 29 22 - 31 mmol/L    Anion Gap, Calculation 8 5 - 18 mmol/L    Glucose 147 (H) 70 - 125 mg/dL    Calcium 9.1 8.5 - 10.5 mg/dL    BUN 52 (H) 8 - 28 mg/dL    Creatinine 1.38 (H) 0.70 - 1.30 mg/dL    GFR MDRD Af Amer >60 >60 mL/min/1.73m2    GFR MDRD Non Af Amer 51 (L) >60 mL/min/1.73m2         Lab Results   Component Value Date    WBC 7.3 01/25/2018    HGB 11.5 (L) 01/25/2018    HCT 35.2 (L) 01/25/2018    MCV 83 01/25/2018     01/25/2018       No results found for: [id number]  Lab Results   Component Value Date    HGBA1C 6.6 (H) 11/01/2017     Lab Results   Component Value Date    INR 1.72 (H) 01/28/2018    INR 1.98 (H) 01/27/2018    INR 2.13 (H) 01/26/2018     No results found for: [id number]  No results found for: TSH        ASSESSMENT/PLAN:    1. Candidiasis of perineal region: Nystatin cream. Improving.   2. Left transmetatarsal amputation, severe PVD, venous stasis ulcers: Continues to follow with vascular clinic, next visit on 2/8. Continue wound care orders per clinic.   3. Type 2 DM: -220. Continue glipizide.   4. CAD, CABG: On metoprolol, nitrostat, simvastatin.   5. Atrial flutter: On metoprolol, rate controlled. On warfarin.   6. HTN: SBP 110s. Stable on lasix, metoprolol. Resumed lisinopril at 10mg.  7. IBS: Imodium prn.   8. Acute on chronic CHF: Daily weights 186-187-189 lbs. On lasix, continue LUKE hose/tubigrips. Recently hospitalized for. Spironolactone was added and lasix increased to bid.  Cr on 1/29 1.58, 1.42 on 1/28 (previous at dmitri was 0.96 prior to hospital stay). Cr 1/30 was 1.38. F/u with CHF clinic in 2 weeks.   9. MESHA: Held lisinopril, will resume at lower dose. Cr improved to 1.38 on 1/30. Was previously on lisinopril 40mg, due to p 110s and MESHA will restart at 10mg. Monitor.  BMP next Monday 2/5.   10. Muscular pain: Across chest and sides, worse with palpation says stretching and laying down help. H/o . Offered ice and to order aspercreme, pt states he will tough it out as long as he knows it isn't his heart. Workup in hospital was negative for cardiac cause.           Electronically signed by: Preeti Conde NP    Total 25 minutes of which 75% was spent in counseling and coordination of care of the above plan    Time spent in interview and examination of patient, review of available records, and discussion with nursing staff. Continue care plan, efforts at therapy, and monitor nutritional status.

## 2021-06-15 NOTE — PROGRESS NOTES
Date of Service:2/8/2018    Chief Complaint:   Chief Complaint   Patient presents with     Wound Check       History:    Ever presents to clinic for evaluation of his foot ulcers.  He has a history that is significant for CAD, diabetes, and venous insufficiency. Status post left transmetatarsal amputation was performed on 11/5/17 by Dr. DESTINY Wick.   On 11/9/2017 he was discharged to a TCU where he currently resides. He is able to do full weight bearing on his heel, per Dr. DESTINY Mchugh on January 5.     The care center is applying Endoform and Mepilex border to the ulcers. He denies pain in his ulcers or feet.     Current Outpatient Prescriptions   Medication Sig Dispense Refill     acetaminophen (TYLENOL) 500 MG tablet Take 2 tablets (1,000 mg total) by mouth every 6 (six) hours as needed.  0     albuterol (PROVENTIL) 2.5 mg /3 mL (0.083 %) nebulizer solution Take 3 mL (2.5 mg total) by nebulization every 6 (six) hours as needed for wheezing or shortness of breath.  0     dextromethorphan-guaiFENesin (ROBITUSSIN-DM)  mg/5 mL liquid Take 10 mL by mouth every 4 (four) hours as needed (cough).       furosemide (LASIX) 40 MG tablet Take 0.5 tablets (20 mg total) by mouth 2 (two) times a day at 9am and 6pm.  0     furosemide (LASIX) 40 MG tablet Take 0.5 tablets (20 mg total) by mouth daily as needed (Extra dose at noon eache day if weight gain> 2lbs in 24 hours).  0     gentamicin (GARAMYCIN) 0.1 % ointment Apply as instructed to ulcer daily 30 g 1     glipiZIDE (GLUCOTROL) 5 MG tablet Take 5 mg by mouth 2 (two) times a day before meals.       Eusebio Therapuetic Nutrition 7-7-1.5 gram PwPk Take 1 packet by mouth daily.       loperamide (IMODIUM) 2 mg capsule Take 1 capsule (2 mg total) by mouth 3 (three) times a day as needed for diarrhea.  0     metoprolol succinate (TOPROL-XL) 50 MG 24 hr tablet Take 50 mg by mouth 2 (two) times a day.        multivitamin with minerals (THERA-M) 9 mg iron-400 mcg Tab tablet Take 1  tablet by mouth daily.       mupirocin (BACTROBAN) 2 % ointment Apply as instructed to ulcer daily 30 g 1     nitroglycerin (NITROSTAT) 0.4 MG SL tablet Place 0.4 mg under the tongue every 5 (five) minutes as needed for chest pain.        omeprazole (PRILOSEC) 20 MG capsule Take 20 mg by mouth daily.        simvastatin (ZOCOR) 40 MG tablet Take 40 mg by mouth at bedtime.        spironolactone (ALDACTONE) 25 MG tablet Take 1 tablet (25 mg total) by mouth daily.  0     urea-alpha hydroxy acids (ATRAC-TAIN, WITH AHA,) 10-4 % Crea Apply 1 application topically daily. Apply after washing legs & feet with water       WARFARIN SODIUM (WARFARIN ORAL) Take by mouth. 2/1/18 INR 2.2  Take 2.5mg daily.  Next INR 2/8/18.       Current Facility-Administered Medications   Medication Dose Route Frequency Provider Last Rate Last Dose     lidocaine 2 % jelly (XYLOCAINE)   Topical PRN Miriam Quinones, CNP   1 application at 02/08/18 1024       Allergies   Allergen Reactions     Latex      Penicillins        Physical Exam:    Vitals:    02/08/18 0900   BP: 118/68   Pulse: 76   Resp: 16   Temp: 98.7  F (37.1  C)    There is no height or weight on file to calculate BMI.    General:  72 y.o. male in no apparent distress.    Psychiatric:  Alert and oriented x 3.  Cooperative.   Integumentary:  Skin is uniformly warm except his foot and toes are cooler to touch compared to his legs, dry and pink.  Lower extremity edema: minimal    Left Foot Ulceration(s):     Wound bed:%100 granular        Undermining no, Tunneling no   Wound Edge: attached  Periwound:  There is no erika wound erythema, induration, maceration, denudement fluctuance or warmth surrounding the ulcer(s).  Exudate: serosanguaneous            Quantity: minimal  Odor:  absent   Wound Shape oval    Labs:    No results found for: SEDRATE  Lab Results   Component Value Date    CRP 8.6 (H) 10/31/2017     No components found for: CREATINE  Lab Results   Component Value Date    BUN  52 (H) 01/30/2018     Lab Results   Component Value Date    HGBA1C 6.6 (H) 11/01/2017         Vasc Edema 12/15/2017 12/22/2017 1/8/2018 1/25/2018 2/8/2018   Right just above MTP 23.2 25.4 27 25.5 23   Right Ankle 25.1 26.8 26 28 27   Right Widest Calf 35.1 40 35.5 40.5 36   Right Thigh Up 10cm - - - - -   Left - just above MTP 24.0 28 29.5 27.5 26   Left Ankle 24.2 25.3 24.8 25.7 22   Left Widest Calf 35.8 36.3 36.5 37.4 33   Left Thigh Up 10cm - - - - -       Wound 12/01/17 right 2nd toe (Active)   Pre Size Length 0 2/8/2018 10:00 AM   Pre Size Width 0 2/8/2018 10:00 AM   Pre Size Depth 0 2/8/2018 10:00 AM   Pre Total Sq cm 0 2/8/2018 10:00 AM   Post Size Length 0 1/25/2018  8:00 AM   Post Size Width 1 12/15/2017 10:00 AM       Wound 12/22/17 L amp medial (Active)   Pre Size Length 0.8 2/8/2018 10:00 AM   Pre Size Width 2 2/8/2018 10:00 AM   Pre Size Depth 0.1 2/8/2018 10:00 AM   Pre Total Sq cm 1.6 2/8/2018 10:00 AM   Post Size Length 0.5 2/8/2018 10:00 AM   Post Size Width 1.5 2/8/2018 10:00 AM   Post Size Depth 0.1 2/8/2018 10:00 AM       Wound 01/25/18 Non-pressure related ulcer Toe Right (Active)       Wound 01/25/18 Amputation Right;3rd digit (Active)       Wound 01/25/18 Non-pressure related ulcer Foot Left (Active)       Incision 01/25/18 Toe Right;2nd digit (Active)       Assessment:  1. Skin ulcer of toe of left foot, limited to breakdown of skin     2. Ulcer of foot, limited to breakdown of skin, unspecified laterality     3. Polyneuropathy associated with underlying disease     4. Type 2 diabetes mellitus with foot ulcer, without long-term current use of insulin     5. Acquired lymphedema of lower extremity         A new wound was identified today: No      Plan:  1.  Debridement of the left leg ulcer was recommended.  After consent was obtained and topical 2% Xylocaine was applied under clean conditions, and using a #15 blade,the devitalized dermal tissue was debrided for a total square centimeters of  1.6. Following debridement, there was a decrease in the nonviable tissue. The patient tolerated the procedure without any difficulty.     2.  Left foot ulceration.  Ulcer is improved. No signs of infection.     3.  Venous insufficiency, stable.    4.  Dermatitis, resolved    5.  Edema, stable.      6.  Diabetes, on Metformin.  6.6 A1c on 11/1/2017.      7.  Treatment:  Will continue with Endoform and cover with a Mepilix border 3x/week.  Will continue with Tubigrip to help reduce the swelling.  I am reluctant to apply a higher level of compression secondary to his PAD.  He will elevate his feet in bed two hours in the am and two hours in the pm.    8.  Patient will follow up with me in 2 weeks for further evaluation and response to the treatment.  If the ulcer fails to improve, I will consider Iodoflex to treat the biofilm.     Miriam Quinones, APRN, CNP,  [facility]  [phone]

## 2021-06-15 NOTE — PROGRESS NOTES
[facility]    Facility:   [facility] [[id number]]   Code Status: FULL CODE      CHIEF COMPLAINT/REASON FOR VISIT:  Chief Complaint   Patient presents with     Problem Visit     rash       HISTORY:      HPI: Ever is a 72 y.o. male who is seen at the request of the patient and the nurse for follow-up regarding perineal rash of yeast.  He states that there has been significant improvement but there are couple of lumps remaining.  He has not had any problems with pain or significant itching on a regular basis and he is not experiencing fevers or chills.  There has been no drainage.  He does have diabetes and blood sugar has been good.  He is not feeling ill in other ways such as URI symptoms.    Past Medical History:   Diagnosis Date     Atrial flutter      Coronary Artery Disease     CABG x2     Diabetic ulcer of both feet 10/31/2017     Dyslipidemia, goal LDL below 70      Foot ulcer      Gangrene of left foot      Hypertension      Neuropathy      Paroxysmal Atrial Fibrillation     Brian Moe: 2011 Cardioversion; CHADS2 = 2 he is on warfarin and sotalol      Type 2 Diabetes Mellitus              Family History   Problem Relation Age of Onset     Sudden death Mother 85     CABG Father      Valvular heart disease Father      Esophageal cancer Father 58      of this     No Medical Problems Son      Social History     Social History     Marital status:      Spouse name: N/A     Number of children: 1     Years of education: N/A     Occupational History      Self Employed     active     Social History Main Topics     Smoking status: Former Smoker     Types: Cigarettes     Quit date: 2000     Smokeless tobacco: Never Used     Alcohol use 0.0 oz/week     2 - 3 Glasses of wine, 2 - 3 Cans of beer per week     Drug use: No     Sexual activity: Yes     Other Topics Concern     Not on file     Social History Narrative    Two granddaughters  Haven Gipson    Patient Lives with son Raciel  11/2017             Review of Systems   All other systems reviewed and are negative.      .  Vitals:    01/13/18 1954   BP: 130/68   Pulse: 68   Resp: 18   Temp: 96.9  F (36.1  C)   Weight: 193 lb (87.5 kg)       Physical Exam   Constitutional: No distress.   Neurological: He is alert.   Skin:   There is slightly pinkish red discoloration of the perineal area and a couple of satellite lesions.  However the appearance is dramatically improved from the fire engine red appearance of last week   Psychiatric: He has a normal mood and affect.   Nursing note and vitals reviewed.        LABS:   Blood glucose 127    ASSESSMENT:      ICD-10-CM    1. Candidiasis of perineum B37.49        PLAN:    Continue with current plan of nystatin ointment and Diflucan by mouth.  Reassurance given that it is healing well and it does take a while to be completely resolved.      Electronically signed by: Carl Young MD

## 2021-06-15 NOTE — PROGRESS NOTES
Vascular surgery: This 72-year-old male comes in 8 weeks following his left transmetatarsal amputation.  The patient has some shallow ulcerations at the medial aspect of the amputation incision with some serous drainage.  There does not appear to be significant infection here.  The patient also has some breakdown of his right second and third toes, secondary to rubbing these in the wheelchair.    Edema is not controlled in either leg and this is an issue.  What I would like to do is to wash both feet and toes with Hibiclens soap at least once daily and then cover it with gauze.  I also advised the nursing home to have the patient lie down for 2 hours each morning and 2 hours each afternoon in an effort to try to control the edema.  The patient will see our nurse practitioner next week and follow-up with me in 3-6 weeks pending the situation

## 2021-06-15 NOTE — PROGRESS NOTES
"Pt indicated he was standing outside for about 5 minutes on Sunday waiting for a ride and his feet feel a little weird every since, \"like he can still feel the cold\"    Also cracks occurred on bilateral shins, dried blood in cracks but not open.  Pt stated he did not scratch himself  "

## 2021-06-15 NOTE — PROGRESS NOTES
[facility]    Facility:   [facility] [[id number]]   Code Status: FULL CODE      CHIEF COMPLAINT/REASON FOR VISIT:  Chief Complaint   Patient presents with     Problem Visit     rash       HISTORY:      HPI: Ever is a 72 y.o. male whom I was asked to see by nursing regarding worsening rash of the perineum.  He had been on nystatin powder for over a week with no improvement of symptoms and was switched to nystatin cream.  He most recently has been placed on doxycycline regarding concern of cellulitis.  He does have history of gangrene of the foot with amputation of toes.    On review of systems today he is not experiencing fevers or chills, no upper respiratory infection symptoms of congestion or sore throat, no cough nor shortness of breath or chest pain.  No abdominal pain or nausea.    Past Medical History:   Diagnosis Date     Atrial flutter      Coronary Artery Disease     CABG x2     Diabetic ulcer of both feet 10/31/2017     Dyslipidemia, goal LDL below 70      Foot ulcer      Gangrene of left foot      Hypertension      Neuropathy      Paroxysmal Atrial Fibrillation     Brian Moe: 2011 Cardioversion; CHADS2 = 2 he is on warfarin and sotalol      Type 2 Diabetes Mellitus              Family History   Problem Relation Age of Onset     Sudden death Mother 85     CABG Father      Valvular heart disease Father      Esophageal cancer Father 58      of this     No Medical Problems Son      Social History     Social History     Marital status:      Spouse name: N/A     Number of children: 1     Years of education: N/A     Occupational History      Self Employed     active     Social History Main Topics     Smoking status: Former Smoker     Types: Cigarettes     Quit date: 2000     Smokeless tobacco: Never Used     Alcohol use 0.0 oz/week     2 - 3 Glasses of wine, 2 - 3 Cans of beer per week     Drug use: No     Sexual activity: Yes     Other  Topics Concern     Not on file     Social History Narrative    Two granddaughters Haven Gipson    Patient Lives with son Raciel  11/2017             Review of Systems   All other systems reviewed and are negative.      .There were no vitals filed for this visit.    Physical Exam   Constitutional: He is oriented to person, place, and time. No distress.   Neurological: He is alert and oriented to person, place, and time.   Skin:   Redness of the scrotum and inguinal area and extending into the perirectal area.  The stump of the foot has some redness and there is an ulceration on the dorsal aspect, but no purulent drainage.   Psychiatric: He has a normal mood and affect.   Nursing note and vitals reviewed.        LABS:   No new laboratory testing available, but there is culture pending regarding foot infection.    ASSESSMENT:      ICD-10-CM    1. Candidiasis of perineum B37.49    2. Gangrene of left foot I96        PLAN:    Add Diflucan 100 mg 1 daily by mouth for 10 days.  Switch nystatin to ointment to be applied twice daily.  Continue with the doxycycline.      Electronically signed by: Carl Young MD

## 2021-06-15 NOTE — PROGRESS NOTES
Date of Service:1/25/2018    Chief Complaint:   Chief Complaint   Patient presents with     Follow-up       History:    Ever presents to clinic for evaluation of his foot ulcers.  He has a history that is significant for CAD, diabetes, and venous insufficiency. Status post left transmetatarsal amputation was performed on 11/5/17 by Dr. DESTINY Wick.   On 11/9/2017 he was discharged to a TCU where he currently resides. He is able to do full weight bearing on his heel, per Dr. DESTINY Mchugh on January 5.     The care center is applying adaptic to the ulcers and covering with a dry gauze.  TMC is being apply to the dermatis.  He denies pain in his ulcers or feet.     Current Outpatient Prescriptions   Medication Sig Dispense Refill     acetaminophen (TYLENOL) 500 MG tablet Take 2 tablets (1,000 mg total) by mouth every 6 (six) hours as needed.  0     furosemide (LASIX) 20 MG tablet Take 20 mg by mouth daily.        gentamicin (GARAMYCIN) 0.1 % ointment Apply as instructed to ulcer daily 30 g 1     glipiZIDE (GLUCOTROL) 5 MG tablet Take 0.5 tablets (2.5 mg total) by mouth 2 (two) times a day before meals. Hold if not eating (Patient taking differently: Take 5 mg by mouth 2 (two) times a day before meals. Hold if not eating)  0     Eusebio Therapuetic Nutrition 7-7-1.5 gram PwPk Take 1 packet by mouth 2 (two) times a day.  0     lisinopril (PRINIVIL,ZESTRIL) 40 MG tablet Take 0.5 tablets (20 mg total) by mouth daily.  0     loperamide (IMODIUM) 2 mg capsule Take 1 capsule (2 mg total) by mouth 3 (three) times a day as needed for diarrhea.  0     metoprolol succinate (TOPROL-XL) 50 MG 24 hr tablet Take 50 mg by mouth 2 (two) times a day.        multivitamin with minerals (THERA-M) 9 mg iron-400 mcg Tab tablet Take 1 tablet by mouth daily.       mupirocin (BACTROBAN) 2 % ointment Apply as instructed to ulcer daily 30 g 1     nitroglycerin (NITROSTAT) 0.4 MG SL tablet Place 0.4 mg under the tongue every 5 (five) minutes as needed for  chest pain.        omeprazole (PRILOSEC) 20 MG capsule Take 20 mg by mouth daily.        simvastatin (ZOCOR) 40 MG tablet Take 40 mg by mouth at bedtime.        triamcinolone (KENALOG) 0.1 % cream Apply to the right lower leg daily 60 g 1     warfarin (COUMADIN) 5 MG tablet Take by mouth See Admin Instructions. 1/15/18 INR 1.6 Increase to 4mg qd. (On Antib thru 1/17)  1/11/18 INR 1.8 3mg qd.  1/9/18 INR 4.7 Hold X2.  1/7/18 INR 5.3 Hold X 2.      On Antibiotic   12/14/17 INR 2.6. Take 5mg daily. Next INR 12/21 11/27/17 INR 1.9 Cont 5mg qd. Next INR 11/20/  11/22/17 INR 1.4 Increase to 5mg qd. Next INR 11/27  11/15 17 INR 1.8 5mg M & F, 2.5mg all other days.       No current facility-administered medications for this visit.        Allergies   Allergen Reactions     Latex      Penicillins        Physical Exam:    Vitals:    01/25/18 0850   BP: 142/80   Pulse: 80   Resp: 16   Temp: 98  F (36.7  C)    There is no height or weight on file to calculate BMI.    General:  72 y.o. male in no apparent distress.    Psychiatric:  Alert and oriented x 3.  Cooperative.   Integumentary:  Skin is uniformly warm except his foot and toes are cooler to touch compared to his legs, dry and pink.  Lower extremity edema: minimal    Left Foot Ulceration(s):     Wound bed: %100 loose slough          Undermining no, Tunneling no   Wound Edge: Epibole   Periwound:  There is no erika wound erythema, induration, maceration, denudement fluctuance or warmth surrounding the ulcer(s).  Exudate: serosanguaneous            Quantity: minimal  Odor:  absent   Wound Shape oval    Labs:    No results found for: SEDRATE  Lab Results   Component Value Date    CRP 8.6 (H) 10/31/2017     No components found for: CREATINE  Lab Results   Component Value Date    BUN 17 11/14/2017     Lab Results   Component Value Date    HGBA1C 6.6 (H) 11/01/2017         Vasc Edema 10/11/2017 12/15/2017 12/22/2017 1/8/2018 1/25/2018   Right just above MTP 23.8 23.2 25.4 27 25.5    Right Ankle 25 25.1 26.8 26 28   Right Widest Calf 35.6 35.1 40 35.5 40.5   Right Thigh Up 10cm - - - - -   Left - just above MTP 24.4 24.0 28 29.5 27.5   Left Ankle 24.5 24.2 25.3 24.8 25.7   Left Widest Calf 36.4 35.8 36.3 36.5 37.4   Left Thigh Up 10cm - - - - -       Wound 12/01/17 right 2nd toe (Active)   Pre Size Length 0.4 1/25/2018  8:00 AM   Pre Size Width 0.2 1/25/2018  8:00 AM   Pre Size Depth 0.1 1/8/2018 10:00 AM   Pre Total Sq cm 0 1/25/2018  8:00 AM   Post Size Length 0 1/25/2018  8:00 AM   Post Size Width 1 12/15/2017 10:00 AM       Wound 12/15/17 Right 3rd toe (Active)   Pre Size Length 0.5 1/25/2018  8:00 AM   Pre Size Width 0.5 1/25/2018  8:00 AM   Pre Size Depth 0.1 1/8/2018 10:00 AM   Pre Total Sq cm 0 1/25/2018  8:00 AM   Post Size Length 0 1/25/2018  8:00 AM       Wound 12/22/17 R 4th digit (Active)   Pre Size Length 0.6 1/25/2018  8:00 AM   Pre Size Width 0.8 1/25/2018  8:00 AM   Pre Size Depth 0.1 1/8/2018 10:00 AM   Pre Total Sq cm 0.6 12/22/2017  9:00 AM   Description scabbed 1/25/2018  8:00 AM       Wound 12/22/17 L amp medial (Active)   Pre Size Length 1 1/25/2018  8:00 AM   Pre Size Width 1.5 1/25/2018  8:00 AM   Pre Size Depth 0.1 12/22/2017  9:00 AM   Pre Total Sq cm 0.75 1/8/2018 10:00 AM   Post Size Length 1 1/25/2018  8:00 AM   Post Size Width 1.5 1/25/2018  8:00 AM   Post Size Depth 0.2 1/8/2018 10:00 AM       Wound 12/22/17 L amp closest to medial (combined with other L amp medial wound) (Active)   Pre Size Length 0 1/25/2018  8:00 AM   Pre Size Width 0 1/25/2018  8:00 AM   Pre Size Depth 0.1 12/22/2017  9:00 AM   Pre Total Sq cm 0.2 12/22/2017  9:00 AM           Assessment:  1. Skin ulcer of toe of left foot, limited to breakdown of skin     2. Ulcer of foot, limited to breakdown of skin, unspecified laterality     3. Type 2 diabetes mellitus with foot ulcer, without long-term current use of insulin     4. Venous hypertension, chronic, bilateral         A new wound was  identified today: No      Plan:  1.  Debridement of the left leg ulcer was recommended.  After consent was obtained and topical 2% Xylocaine was applied under clean conditions, and using a #15 blade,the devitalized dermal tissue was debrided for a total square centimeters of 1.5. Following debridement, there was a decrease in the nonviable tissue. The patient tolerated the procedure without any difficulty.     2.  Right second anterior toe ulcer, resolved    3.  Right third toe ulcer, resolved    4.  Right first toe ulcer, resolved.    5.  Left foot ulceration.  Ulcer is improved.  He finished taking his antibiotic    6.  Venous insufficiency, stable.    4.  Dermatitis, improved    5.  Edema, stable.      6.  Diabetes, on Metformin.  6.6 A1c on 11/1/2017.      7.  Treatment:  Will discontinue adaptic to the foot ulcer and apply Endoform and cover with a Mepilix border 3x/week. Will discontinue the TMC cream to the dermatitis and start Aquaphor to moisturize his dry skin. Will continue with Tubigrip to help reduce the swelling.  I am reluctant to apply a higher level of compression secondary to his PAD.  He will elevate his feet in bed two hours in the am and two hours in the pm.    8.  Patient will follow up with me in 2 weeks for further evaluation and response to the treatment.  If the ulcer fails to improve, I will consider Iodoflex to treat the biofilm.     Miriam Quinones, APRN, CNP,  [facility]  [phone]

## 2021-06-15 NOTE — PROGRESS NOTES
[facility]      Facility:    [facility] [[id number]]  Code Status: FULL CODE      Chief Complaint/Reason for Visit:  Chief Complaint   Patient presents with     H & P       HPI:   Ever is a 72 y.o. male who has a history of congestive heart failure, type 2 diabetes mellitus, peripheral artery disease, atrial fibrillation on chronic Coumadin anticoagulation, CAD, hypertension, diabetic foot ulcers and subsequent partial amputation, hypertension and hyperlipidemia who was having 4 days of shortness of breath with CASTANO, orthopnea, increased edema and decreased exercise tolerance.  His BNP was elevated.  His creatinine was 1.5 from previously normal baseline.  CTA chest was negative for PE but did show bilateral pleural effusions.  Diuretic increase of his Lasix as well as 1 dose of metolazone improved his symptoms.    On review of systems currently he is not having fevers or chills, no nasal congestion or sore throat, no cough or chest pain.  Shortness of breath has resolved.  He does not have abdominal pain or nausea.  No bowel changes.  No dysuria.  He does still have edema with feet wrapped.  He states that the status of his foot problem is markedly improved.  Also his rash of the groin area has dramatically improved.    Past Medical History:  Past Medical History:   Diagnosis Date     Atrial flutter      Coronary Artery Disease     CABG x2     Diabetic ulcer of both feet 10/31/2017     Dyslipidemia, goal LDL below 70      Foot ulcer      Gangrene of left foot      Hypertension      Neuropathy      Paroxysmal Atrial Fibrillation     Brian Moe: 8/2011 Cardioversion; CHADS2 = 2 he is on warfarin and sotalol      Type 2 Diabetes Mellitus            Surgical History:  Past Surgical History:   Procedure Laterality Date     CARDIOVERSION  8/25/2011     CORONARY ARTERY BYPASS GRAFT  3/6/2000    SVG to OM1, SVG to PDA     FOOT AMPUTATION Left 11/5/2017    Procedure: LEFT  TRANSMETATARSAL AMPUTATION;  Surgeon: Ever Wick MD;  Location: [facility] Main OR;  Service:      INGUINAL HERNIA REPAIR Bilateral  and        Family History:   Family History   Problem Relation Age of Onset     Sudden death Mother 85     CABG Father      Valvular heart disease Father      Esophageal cancer Father 58      of this     No Medical Problems Son        Social History:    Social History     Social History     Marital status:      Spouse name: N/A     Number of children: 1     Years of education: N/A     Occupational History      Self Employed     active     Social History Main Topics     Smoking status: Former Smoker     Types: Cigarettes     Quit date: 2000     Smokeless tobacco: Never Used     Alcohol use 0.0 oz/week     2 - 3 Glasses of wine, 2 - 3 Cans of beer per week     Drug use: No     Sexual activity: Yes     Other Topics Concern     Not on file     Social History Narrative    Two granddaughters Haven Gipson    Patient Lives with son Raciel  2017              Review of Systems   All other systems reviewed and are negative.      Vitals:    18 1457   Pulse: 67   Resp: 18   Temp: 96.8  F (36  C)   SpO2: 93%   Weight: 187 lb 9.6 oz (85.1 kg)       Physical Exam   Constitutional: He is oriented to person, place, and time. No distress.   HENT:   Mouth/Throat: Oropharynx is clear and moist.   Eyes: Right eye exhibits no discharge. Left eye exhibits no discharge.   Neck: No JVD present. No thyromegaly present.   No JVD or HJR.   Cardiovascular: Normal rate and normal heart sounds.    Irregular   Pulmonary/Chest: Effort normal and breath sounds normal. He has no wheezes.   Abdominal: Soft. Bowel sounds are normal. He exhibits no distension. There is no tenderness.   Musculoskeletal:   Feet are wrapped and I did not remove these at his request.  He does have duct tape over the bandaging as well.  I also did not examine the groin rash because he states  that it has healed and he sees no reason for me to check it again.   Lymphadenopathy:     He has no cervical adenopathy.   Neurological: He is alert and oriented to person, place, and time.   Skin:   As above regarding groin rash that is healed and was not checked today   Psychiatric: He has a normal mood and affect. His behavior is normal.   Nursing note and vitals reviewed.      Medication List:  Current Outpatient Prescriptions   Medication Sig     acetaminophen (TYLENOL) 500 MG tablet Take 2 tablets (1,000 mg total) by mouth every 6 (six) hours as needed.     albuterol (PROVENTIL) 2.5 mg /3 mL (0.083 %) nebulizer solution Take 3 mL (2.5 mg total) by nebulization every 6 (six) hours as needed for wheezing or shortness of breath.     dextromethorphan-guaiFENesin (ROBITUSSIN-DM)  mg/5 mL liquid Take 10 mL by mouth every 4 (four) hours as needed (cough).     furosemide (LASIX) 40 MG tablet Take 0.5 tablets (20 mg total) by mouth 2 (two) times a day at 9am and 6pm.     furosemide (LASIX) 40 MG tablet Take 0.5 tablets (20 mg total) by mouth daily as needed (Extra dose at noon eache day if weight gain> 2lbs in 24 hours).     gentamicin (GARAMYCIN) 0.1 % ointment Apply as instructed to ulcer daily     glipiZIDE (GLUCOTROL) 5 MG tablet Take 5 mg by mouth 2 (two) times a day before meals.     Eusebio Therapuetic Nutrition 7-7-1.5 gram PwPk Take 1 packet by mouth daily.     loperamide (IMODIUM) 2 mg capsule Take 1 capsule (2 mg total) by mouth 3 (three) times a day as needed for diarrhea.     metoprolol succinate (TOPROL-XL) 50 MG 24 hr tablet Take 50 mg by mouth 2 (two) times a day.      multivitamin with minerals (THERA-M) 9 mg iron-400 mcg Tab tablet Take 1 tablet by mouth daily.     mupirocin (BACTROBAN) 2 % ointment Apply as instructed to ulcer daily     nitroglycerin (NITROSTAT) 0.4 MG SL tablet Place 0.4 mg under the tongue every 5 (five) minutes as needed for chest pain.      nystatin (MYCOSTATIN) ointment  Apply 1 application topically 2 (two) times a day. To red groin x 10 days (started 1/24)     omeprazole (PRILOSEC) 20 MG capsule Take 20 mg by mouth daily.      simvastatin (ZOCOR) 40 MG tablet Take 40 mg by mouth at bedtime.      spironolactone (ALDACTONE) 25 MG tablet Take 1 tablet (25 mg total) by mouth daily.     urea-alpha hydroxy acids (ATRAC-TAIN, WITH AHA,) 10-4 % Crea Apply 1 application topically daily. Apply after washing legs & feet with water     warfarin (COUMADIN) 2 MG tablet Take 2 mg by mouth daily.       Labs:  Blood glucose 167.  He will continue with a low-salt diet    Assessment:    ICD-10-CM    1. Heart failure with preserved ejection fraction I50.30    2. Type 2 diabetes mellitus with foot ulcer, without long-term current use of insulin E11.621     L97.509    3. Chronic atrial fibrillation I48.2    4. Coronary artery disease involving coronary bypass graft of native heart without angina pectoris I25.810    5. PAD (peripheral artery disease) I73.9    6. Acquired lymphedema of lower extremity I89.0    7. Diabetic ulcer of both feet E11.621     L97.519     L97.529        Plan:  Adjustment of his medications as has been done at the hospital for increased diuresis.  Low-salt diet.  Continue with maintenance care.      Electronically signed by: Carl Young MD

## 2021-06-15 NOTE — PROGRESS NOTES
[facility]    DATE: 1/3/2018    NAME:  Ever Crane             :  1945  MRN: [id number]  CODE STATUS:  FULL CODE    VISIT TYPE: Problem Visit (yeast infection)     FACILITY:  [facility] [[id number]]       CHIEF COMPLAIN/REASON FOR VISIT:    Chief Complaint   Patient presents with     Problem Visit     yeast infection               HISTORY OF PRESENT ILLNESS: Ever Crane is a 72 y.o. male who is a long term care resident of [facility]. He has PMH of PVD with transmetetarsal amputation of left foot on , Type 2 DM, venous stasis ulcers, Atrial flutter, CAD, CABG x2, HLD, HTN, neuropathy, PAF. Today nursing requested he be seen as he has been refusing therapy and complaining of yeast infection.     Today Mr. Crane states he is very uncomfortable from his yeast infection in his groin and so he cannot wear clothes. He is trying to let himself air out. He says the powder is not helping much. He thinks his leg swelling is improved from usual for him. He says he is sleeping fine and has a good appetite. He thinks his blood sugars have improved. He denies any other concerns at this time. He says therapy makes him wear clothes and that is why he doesn't want to go because he doesn't want to wear clothes as they are uncomfortable. After discussion he is agreeable to therapy this afternoon.     Per nursing found patient ambulating in hallway without assistive device this am with only gown on and untied in back with no bottoms on. He was insisting he needed to see his nurse right away. Refuses therapy and cares intermittently per staff.     REVIEW OF SYSTEMS:  PROBLEMS AND REVIEW OF SYSTEMS:   Review of Systems    Currently, no fever, chills, or rigors. Does not have any visual or hearing problems. Denies any chest pain, headaches, palpitations, lightheadedness, dizziness. Appetite is good. Denies any GERD symptoms. Denies any difficulty with swallowing,  nausea, or vomiting.  Denies any abdominal pain, diarrhea or constipation. Denies any urinary symptoms. No insomnia. No active bleeding. Wounds on feet/legs, shortness of breath at baseline, positive for leg edema      Allergies   Allergen Reactions     Latex      Penicillins      Current Outpatient Prescriptions   Medication Sig     acetaminophen (TYLENOL) 500 MG tablet Take 2 tablets (1,000 mg total) by mouth every 6 (six) hours as needed.     furosemide (LASIX) 20 MG tablet Take 20 mg by mouth daily.      glipiZIDE (GLUCOTROL) 5 MG tablet Take 0.5 tablets (2.5 mg total) by mouth 2 (two) times a day before meals. Hold if not eating (Patient taking differently: Take 5 mg by mouth 2 (two) times a day before meals. Hold if not eating)     Eusebio Therapuetic Nutrition 7-7-1.5 gram PwPk Take 1 packet by mouth 2 (two) times a day.     lisinopril (PRINIVIL,ZESTRIL) 40 MG tablet Take 0.5 tablets (20 mg total) by mouth daily.     loperamide (IMODIUM) 2 mg capsule Take 1 capsule (2 mg total) by mouth 3 (three) times a day as needed for diarrhea.     metoprolol succinate (TOPROL-XL) 50 MG 24 hr tablet Take 50 mg by mouth 2 (two) times a day.      multivitamin with minerals (THERA-M) 9 mg iron-400 mcg Tab tablet Take 1 tablet by mouth daily.     nitroglycerin (NITROSTAT) 0.4 MG SL tablet Place 0.4 mg under the tongue every 5 (five) minutes as needed for chest pain.      omeprazole (PRILOSEC) 20 MG capsule Take 20 mg by mouth daily.      simvastatin (ZOCOR) 40 MG tablet Take 40 mg by mouth at bedtime.      triamcinolone (KENALOG) 0.1 % cream Apply to the right lower leg daily     warfarin (COUMADIN) 5 MG tablet Take by mouth See Admin Instructions. 12/14/17 INR 2.6. Take 5mg daily. Next INR 12/21 11/27/17 INR 1.9 Cont 5mg qd. Next INR 11/20/  11/22/17 INR 1.4 Increase to 5mg qd. Next INR 11/27  11/15 17 INR 1.8 5mg M & F, 2.5mg all other days.     Past Medical History:    Past Medical History:   Diagnosis Date     Atrial  flutter      Coronary Artery Disease     CABG x2     Diabetic ulcer of both feet 10/31/2017     Dyslipidemia, goal LDL below 70      Foot ulcer      Gangrene of left foot      Hypertension      Neuropathy      Paroxysmal Atrial Fibrillation     Brian Moe: 8/2011 Cardioversion; CHADS2 = 2 he is on warfarin and sotalol      Type 2 Diabetes Mellitus            PHYSICAL EXAMINATION  Vitals:    01/03/18 0700   BP: 135/73   Pulse: 66   Resp: 18   Temp: 97.7  F (36.5  C)   SpO2: 93%           GENERAL: Awake, Alert, oriented x3, not in any form of acute distress, answers questions appropriately, follows simple commands, conversant  HEENT: Head is normocephalic with normal hair distribution. No evidence of trauma. Ears: No acute purulent discharge. Eyes: Conjunctivae pink with no scleral jaundice. Nose: Normal mucosa and septum. NECK: Supple with no cervical or supraclavicular lymphadenopathy. Trachea is midline.   CHEST: No tenderness or deformity, no crepitus  LUNG: Clear to auscultation with good chest expansion. There are no crackles or wheezes, normal AP diameter.  BACK: No kyphosis of the thoracic spine. Symmetric, no curvature, ROM normal, no CVA tenderness, no spinal tenderness   CVS: irregularly irregular, rhythm, there are no murmurs, rubs, gallops, or heaves,  2+ pulses symmetric in all extremities.  ABDOMEN: Rounded and soft, nontender to palpation, non distended, no masses, no organomegaly, good bowel sounds, no rebound or guarding, no peritoneal signs.   EXTREMITIES: 2+ ble pedal edema, denisse discoloration ble, transmetatarsal amputation left foot, scattered venous stasis ulcers  SKIN: Warm and dry, no erythema noted.  Skin color, texture, no rashes or lesions.  NEUROLOGICAL: The patient is oriented to person, place and time. Strength and sensation are grossly intact. Face is symmetric.Irritable at times, noncompliant at times. Irrational judgement at times.            LABS:   No results found for this or  any previous visit (from the past 168 hour(s)).  Results for orders placed or performed during the hospital encounter of 11/13/17   Basic metabolic panel   Result Value Ref Range    Sodium 140 136 - 145 mmol/L    Potassium 4.4 3.5 - 5.0 mmol/L    Chloride 108 (H) 98 - 107 mmol/L    CO2 24 22 - 31 mmol/L    Anion Gap, Calculation 8 5 - 18 mmol/L    Glucose 111 70 - 125 mg/dL    Calcium 9.4 8.5 - 10.5 mg/dL    BUN 17 8 - 28 mg/dL    Creatinine 0.98 0.70 - 1.30 mg/dL    GFR MDRD Af Amer >60 >60 mL/min/1.73m2    GFR MDRD Non Af Amer >60 >60 mL/min/1.73m2         Lab Results   Component Value Date    WBC 9.2 11/13/2017    HGB 13.2 (L) 11/13/2017    HCT 41.1 11/13/2017    MCV 86 11/13/2017     11/13/2017       No results found for: [id number]  Lab Results   Component Value Date    HGBA1C 6.6 (H) 11/01/2017     Lab Results   Component Value Date    INR 1.74 (H) 11/14/2017    INR 1.60 (H) 11/13/2017    INR 1.56 (H) 11/09/2017     No results found for: [id number]  No results found for: TSH        ASSESSMENT/PLAN:    1. Candidiasis of perineal region: States nystatin powder not effective, will order nystatin cream. One dose of diflucan today.   2. Left transmetatarsal amputation, severe PVD, venous stasis ulcers: Continues to follow with vascular clinic, last visit 12/22. Continue wound care orders per clinic. F/u with wound care nurse 2-3 weeks from last visit, f/u with Dr. Wick 1/5.   3. Type 2 DM: Better controlled, -161. Continue glipizide.   4. CAD, CABG: On metoprolol, nitrostat, simvastatin.   5. Atrial flutter: On metoprolol, rate controlled. On warfarin.   6. HTN: SBP 130s. Stable on lasix, lisinopril, metoprolol.   7. IBS: Imodium prn.   8. Dependent edema, lymphedema: On lasix, continue LUKE hose/tubigrips. Per pt improved. Cr 12/6 stable 0.84.           Electronically signed by: Preeti Conde    Total 35 minutes of which 75% was spent in counseling and coordination of care of the above plan    Time  spent in interview and examination of patient, review of available records, and discussion with nursing staff. Continue care plan, efforts at therapy, and monitor nutritional status.

## 2021-06-16 NOTE — PROGRESS NOTES
Date of Service:2/19/2018    Chief Complaint:   Chief Complaint   Patient presents with     Wound Check       History:    Ever presents to clinic for evaluation of his foot ulcers.  He has a history that is significant for CAD, diabetes, and venous insufficiency. Status post left transmetatarsal amputation was performed on 11/5/17 by Dr. DESTINY Wick.   On 11/9/2017 he was discharged to a TCU where he currently resides. He is able to do full weight bearing on his heel, per Dr. DESTINY Mchugh on January 5.     The care center is applying Endoform and Mepilex border to the ulcers. He denies pain in his ulcers or feet, but his drainage has increased.    Current Outpatient Prescriptions   Medication Sig Dispense Refill     acetaminophen (TYLENOL) 500 MG tablet Take 2 tablets (1,000 mg total) by mouth every 6 (six) hours as needed.  0     albuterol (PROVENTIL) 2.5 mg /3 mL (0.083 %) nebulizer solution Take 3 mL (2.5 mg total) by nebulization every 6 (six) hours as needed for wheezing or shortness of breath.  0     dextromethorphan-guaiFENesin (ROBITUSSIN-DM)  mg/5 mL liquid Take 10 mL by mouth every 4 (four) hours as needed (cough).       furosemide (LASIX) 40 MG tablet Take 0.5 tablets (20 mg total) by mouth 2 (two) times a day at 9am and 6pm.  0     furosemide (LASIX) 40 MG tablet Take 0.5 tablets (20 mg total) by mouth daily as needed (Extra dose at noon eache day if weight gain> 2lbs in 24 hours).  0     gentamicin (GARAMYCIN) 0.1 % ointment Apply as instructed to ulcer daily 30 g 1     glipiZIDE (GLUCOTROL) 5 MG tablet Take 5 mg by mouth 2 (two) times a day before meals.       Eusebio Therapuetic Nutrition 7-7-1.5 gram PwPk Take 1 packet by mouth daily.       loperamide (IMODIUM) 2 mg capsule Take 1 capsule (2 mg total) by mouth 3 (three) times a day as needed for diarrhea.  0     metoprolol succinate (TOPROL-XL) 50 MG 24 hr tablet Take 50 mg by mouth 2 (two) times a day.        multivitamin with minerals (THERA-M) 9 mg  iron-400 mcg Tab tablet Take 1 tablet by mouth daily.       mupirocin (BACTROBAN) 2 % ointment Apply as instructed to ulcer daily 30 g 1     nitroglycerin (NITROSTAT) 0.4 MG SL tablet Place 0.4 mg under the tongue every 5 (five) minutes as needed for chest pain.        omeprazole (PRILOSEC) 20 MG capsule Take 20 mg by mouth daily.        simvastatin (ZOCOR) 40 MG tablet Take 40 mg by mouth at bedtime.        spironolactone (ALDACTONE) 25 MG tablet Take 1 tablet (25 mg total) by mouth daily.  0     urea-alpha hydroxy acids (ATRAC-TAIN, WITH AHA,) 10-4 % Crea Apply 1 application topically daily. Apply after washing legs & feet with water       WARFARIN SODIUM (WARFARIN ORAL) Take by mouth. 2/1/18 INR 2.2  Take 2.5mg daily.  Next INR 2/8/18.       doxycycline (VIBRA-TABS) 100 MG tablet Take 1 tablet (100 mg total) by mouth 2 (two) times a day for 10 days. 20 tablet 0     Current Facility-Administered Medications   Medication Dose Route Frequency Provider Last Rate Last Dose     lidocaine 2 % jelly (XYLOCAINE)   Topical PRN Miriam Quinones, CNP   1 application at 02/19/18 0929       Allergies   Allergen Reactions     Latex      Penicillins        Physical Exam:    Vitals:    02/19/18 0900   BP: 120/82   Pulse: 64   Resp: 16    There is no height or weight on file to calculate BMI.    General:  72 y.o. male in no apparent distress.    Psychiatric:  Alert and oriented x 3.  Cooperative.   Integumentary:  Skin is uniformly warm except his left foot amputated site is warm and red.    Lower extremity edema: minimal    Left Foot Ulceration(s):     Wound bed:%100 adhered slough      Undermining no, Tunneling no   Wound Edge: attached  Periwound:  There is erika wound erythema, maceration and warmth, but no induration, denudement fluctuance surrounding the ulcer(s).  Exudate:serous          Quantity: minimal  Odor:  absent   Wound Shape oval    Labs:    No results found for: SEDRATE  Lab Results   Component Value Date    CRP  8.6 (H) 10/31/2017     No components found for: CREATINE  Lab Results   Component Value Date    BUN 52 (H) 01/30/2018     Lab Results   Component Value Date    HGBA1C 6.6 (H) 11/01/2017         Vasc Edema 12/22/2017 1/8/2018 1/25/2018 2/8/2018 2/19/2018   Right just above MTP 25.4 27 25.5 23 23   Right Ankle 26.8 26 28 27 26   Right Widest Calf 40 35.5 40.5 36 37.5   Right Thigh Up 10cm - - - - -   Left - just above MTP 28 29.5 27.5 26 28   Left Ankle 25.3 24.8 25.7 22 25   Left Widest Calf 36.3 36.5 37.4 33 35   Left Thigh Up 10cm - - - - -       Wound 12/01/17 right 2nd toe (Active)   Pre Size Length 0 2/8/2018 10:00 AM   Pre Size Width 0 2/8/2018 10:00 AM   Pre Size Depth 0 2/8/2018 10:00 AM   Pre Total Sq cm 0 2/8/2018 10:00 AM   Post Size Length 0 1/25/2018  8:00 AM   Post Size Width 1 12/15/2017 10:00 AM       Wound 12/22/17 L amp medial (Active)   Pre Size Length 1.1 2/19/2018  9:00 AM   Pre Size Width 2.3 2/19/2018  9:00 AM   Pre Size Depth 0.1 2/19/2018  9:00 AM   Pre Total Sq cm 2.53 2/19/2018  9:00 AM   Post Size Length 1.2 2/19/2018  9:00 AM   Post Size Width 2.5 2/19/2018  9:00 AM   Post Size Depth 0.1 2/19/2018  9:00 AM       Wound 02/19/18 Left amp site (Active)   Pre Size Length 0.3 2/19/2018  9:00 AM   Pre Size Width 0.8 2/19/2018  9:00 AM   Pre Size Depth 0.1 2/19/2018  9:00 AM   Pre Total Sq cm 0.24 2/19/2018  9:00 AM       Wound 01/25/18 Non-pressure related ulcer Toe Right (Active)       Wound 01/25/18 Amputation Right;3rd digit (Active)       Wound 01/25/18 Non-pressure related ulcer Foot Left (Active)       Incision 01/25/18 Toe Right;2nd digit (Active)       Assessment:  1. Ulcer of foot, limited to breakdown of skin, unspecified laterality  doxycycline (VIBRA-TABS) 100 MG tablet    mupirocin (BACTROBAN) 2 % ointment    gentamicin (GARAMYCIN) 0.1 % ointment   2. Polyneuropathy associated with underlying disease     3. PAD (peripheral artery disease)     4. Ischemic cardiomyopathy     5. Local  infection of wound  mupirocin (BACTROBAN) 2 % ointment    gentamicin (GARAMYCIN) 0.1 % ointment    Culture/Gram Stain: Wound   6. Type 2 diabetes mellitus with foot ulcer, without long-term current use of insulin     7. Skin ulcer of toe of right foot, limited to breakdown of skin  doxycycline (VIBRA-TABS) 100 MG tablet    mupirocin (BACTROBAN) 2 % ointment    gentamicin (GARAMYCIN) 0.1 % ointment    Culture/Gram Stain: Wound       A new wound was identified today: No      Plan:  1.  Debridement of the left leg ulcer was recommended.  After consent was obtained and topical 2% Xylocaine was applied under clean conditions, and using a #15 blade,the devitalized dermal tissue was debrided for a total square centimeters of 2.93. Following debridement, there was a decrease in the nonviable tissue. The patient tolerated the procedure without any difficulty.     2.  Left foot ulceration.  Ulcer is worse.  There are signs of infection.  I wrote a prescription for Doxycycline.  I obtained an aerobic culture and sensitivity today.    3.  Venous insufficiency, stable.    4.  Dermatitis, resolved    5.  Edema, stable.      6.  Diabetes, on Metformin.  6.6 A1c on 11/1/2017.      7.  Treatment:  Will discontinue with Endoform and start equal parts of Bactroban and Gentamycin and cover with a Mepilix border daily.  Will continue with Tubigrip to help reduce the swelling.  I am reluctant to apply a higher level of compression secondary to his PAD.  He will elevate his feet in bed two hours in the am and two hours in the pm.    8.  Patient will follow up with me in 2 weeks for further evaluation and response to the treatment.  If the ulcer fails to improve, I will consider Iodoflex to treat the biofilm.     Miriam Quinones, APRN, CNP,  [facility]  [phone]

## 2021-06-16 NOTE — PROGRESS NOTES
[facility]    DATE: 2018    NAME:  Ever Crane             :  1945  MRN: [id number]  CODE STATUS:  FULL CODE    VISIT TYPE: Review Of Multiple Medical Conditions     FACILITY:  [facility] [[id number]]       CHIEF COMPLAIN/REASON FOR VISIT:    Chief Complaint   Patient presents with     Review Of Multiple Medical Conditions               HISTORY OF PRESENT ILLNESS: Ever Crane is a 72 y.o. male who is a long term care resident of [facility]. He has PMH of PVD with transmetetarsal amputation of left foot on , Type 2 DM, venous stasis ulcers, Atrial flutter, CAD, CABG x2, HLD, HTN, neuropathy, PAF.     Today Mr. Crane states he is doing better since he talked to Dr. Young about his concern regarding hemorrhoids. He thinks that he needs to have surgery to correct them however after talking to Dr. Young he says he may not. He is ok with just watching them for now and treating them with medication when needed. He is upset because he forgot to show Dr. Young his underwear as he wanted to show him the drainage and ask if it was normal. He is wondering if they can be looked at today. He says he is on the antibiotic for his infected wound on his foot and it is looking better to him. He says he saw cardiology recently and they thought he was doing as well. He thinks the swelling in his legs is better than it has been. He says his jock itch is improved but not 100% cleared up. He is still doing the cream at bedtime and powder during the day. He has no other concerns today. Per nursing cardiology called and reduced lasix to 20mg daily due to worsening renal function. No other concerns.     REVIEW OF SYSTEMS:  PROBLEMS AND REVIEW OF SYSTEMS:   Review of Systems  Today on ROS:   Currently, no fever, chills, or rigors. Does not have any visual or hearing problems. Denies any chest pain, headaches, palpitations, lightheadedness, dizziness. Appetite is  good. Denies any GERD symptoms. Denies any difficulty with swallowing, nausea, or vomiting.  Denies any abdominal pain, diarrhea or constipation. Denies any urinary symptoms. No insomnia. No active bleeding. Wounds on feet/legs, denies sob today, positive for leg edema, perineal rash, wound infection of foot, hemorrhoids      Allergies   Allergen Reactions     Latex      Penicillins      Current Outpatient Prescriptions   Medication Sig     acetaminophen (TYLENOL) 500 MG tablet Take 2 tablets (1,000 mg total) by mouth every 6 (six) hours as needed.     albuterol (PROVENTIL) 2.5 mg /3 mL (0.083 %) nebulizer solution Take 3 mL (2.5 mg total) by nebulization every 6 (six) hours as needed for wheezing or shortness of breath.     dextromethorphan-guaiFENesin (ROBITUSSIN-DM)  mg/5 mL liquid Take 10 mL by mouth every 4 (four) hours as needed (cough).     doxycycline (VIBRA-TABS) 100 MG tablet Take 1 tablet (100 mg total) by mouth 2 (two) times a day for 10 days.     furosemide (LASIX) 40 MG tablet Take 0.5 tablets (20 mg total) by mouth daily as needed (Extra dose at noon eache day if weight gain> 2lbs in 24 hours).     furosemide (LASIX) 40 MG tablet Take 1 tablet (40 mg total) by mouth daily.     gentamicin (GARAMYCIN) 0.1 % ointment Apply as instructed to ulcer daily     glipiZIDE (GLUCOTROL) 5 MG tablet Take 5 mg by mouth 2 (two) times a day before meals.     Eusebio Therapuetic Nutrition 7-7-1.5 gram PwPk Take 1 packet by mouth daily.     loperamide (IMODIUM) 2 mg capsule Take 1 capsule (2 mg total) by mouth 3 (three) times a day as needed for diarrhea.     metoprolol succinate (TOPROL-XL) 50 MG 24 hr tablet Take 50 mg by mouth 2 (two) times a day.      multivitamin with minerals (THERA-M) 9 mg iron-400 mcg Tab tablet Take 1 tablet by mouth daily.     mupirocin (BACTROBAN) 2 % ointment Apply as instructed to ulcer daily     nitroglycerin (NITROSTAT) 0.4 MG SL tablet Place 0.4 mg under the tongue every 5 (five)  minutes as needed for chest pain.      omeprazole (PRILOSEC) 20 MG capsule Take 20 mg by mouth daily.      simvastatin (ZOCOR) 40 MG tablet Take 40 mg by mouth at bedtime.      spironolactone (ALDACTONE) 25 MG tablet Take 1 tablet (25 mg total) by mouth daily.     urea-alpha hydroxy acids (ATRAC-TAIN, WITH AHA,) 10-4 % Crea Apply 1 application topically daily. Apply after washing legs & feet with water     WARFARIN SODIUM (WARFARIN ORAL) Take by mouth. 2/1/18 INR 2.2  Take 2.5mg daily.  Next INR 2/8/18.     Past Medical History:    Past Medical History:   Diagnosis Date     Atrial flutter      Coronary Artery Disease     CABG x2     Diabetic ulcer of both feet 10/31/2017     Dyslipidemia, goal LDL below 70      Foot ulcer      Gangrene of left foot      Hypertension      Neuropathy      Paroxysmal Atrial Fibrillation     Brian Moe: 8/2011 Cardioversion; CHADS2 = 2 he is on warfarin and sotalol      Type 2 Diabetes Mellitus            PHYSICAL EXAMINATION  Vitals:    02/27/18 2038   BP: 119/76   Pulse: 87   Resp: 20   Temp: 96.7  F (35.9  C)   SpO2: 98%     Today on physical exam:      GENERAL: Awake, Alert, oriented x3, not in any form of acute distress, answers questions appropriately, follows simple commands, conversant  HEENT: Head is normocephalic with normal hair distribution. No evidence of trauma. Ears: No acute purulent discharge. Eyes: Conjunctivae pink with no scleral jaundice. Nose: Normal mucosa and septum. NECK: Supple with no cervical or supraclavicular lymphadenopathy. Trachea is midline.   CHEST: No tenderness or deformity, no crepitus  LUNG: dim to auscultation with good chest expansion. There are no crackles or wheezes, normal AP diameter.  BACK: No kyphosis of the thoracic spine. Symmetric, no curvature, ROM normal, no CVA tenderness, no spinal tenderness   CVS: irregularly irregular, rhythm, there are no murmurs, rubs, gallops, or heaves,  2+ pulses symmetric in all extremities.  ABDOMEN:  Rounded and soft, nontender to palpation, non distended, no masses, no organomegaly, good bowel sounds, no rebound or guarding, no peritoneal signs.   EXTREMITIES: 1-2+ ble pedal edema, denisse discoloration ble, transmetatarsal amputation left foot site and wounds covered with dressing, scattered venous stasis ulcers  SKIN: Warm and dry, no erythema noted.  Skin color, texture,perineal rash maculopapular   NEUROLOGICAL: The patient is oriented to person, place and time. Strength and sensation are grossly intact. Face is symmetric.Irritable at times, noncompliant at times. Irrational judgement at times.            LABS:   Recent Results (from the past 168 hour(s))   Basic metabolic panel   Result Value Ref Range    Sodium 139 136 - 145 mmol/L    Potassium 4.5 3.5 - 5.0 mmol/L    Chloride 104 98 - 107 mmol/L    CO2 22 22 - 31 mmol/L    Anion Gap, Calculation 13 5 - 18 mmol/L    Glucose 117 70 - 125 mg/dL    Calcium 9.5 8.5 - 10.5 mg/dL    BUN 44 (H) 8 - 28 mg/dL    Creatinine 1.63 (H) 0.70 - 1.30 mg/dL    GFR MDRD Af Amer 51 (L) >60 mL/min/1.73m2    GFR MDRD Non Af Amer 42 (L) >60 mL/min/1.73m2     Results for orders placed or performed in visit on 02/23/18   Basic metabolic panel   Result Value Ref Range    Sodium 139 136 - 145 mmol/L    Potassium 4.5 3.5 - 5.0 mmol/L    Chloride 104 98 - 107 mmol/L    CO2 22 22 - 31 mmol/L    Anion Gap, Calculation 13 5 - 18 mmol/L    Glucose 117 70 - 125 mg/dL    Calcium 9.5 8.5 - 10.5 mg/dL    BUN 44 (H) 8 - 28 mg/dL    Creatinine 1.63 (H) 0.70 - 1.30 mg/dL    GFR MDRD Af Amer 51 (L) >60 mL/min/1.73m2    GFR MDRD Non Af Amer 42 (L) >60 mL/min/1.73m2         Lab Results   Component Value Date    WBC 7.3 01/25/2018    HGB 11.5 (L) 01/25/2018    HCT 35.2 (L) 01/25/2018    MCV 83 01/25/2018     01/25/2018       No results found for: [id number]  Lab Results   Component Value Date    HGBA1C 6.6 (H) 11/01/2017     Lab Results   Component Value Date    INR 1.72 (H) 01/28/2018    INR  1.98 (H) 01/27/2018    INR 2.13 (H) 01/26/2018     No results found for: [id number]  No results found for: TSH        ASSESSMENT/PLAN:    1. Candidiasis of perineal region: Nystatin cream and powder. Improved.   2. Left transmetatarsal amputation, severe PVD, venous stasis ulcers: Continues to follow with vascular clinic, next visit on 3/9. Continue wound care orders per clinic. On doxycycline for wound infection currently.   3. Type 2 DM: -245. Continue glipizide. Noncompliant with diet.   4. CAD, CABG: On metoprolol, nitrostat, simvastatin.   5. Atrial flutter: On metoprolol, rate controlled 80s. On warfarin.   6. HTN: SBP 110s. Stable on lasix, metoprolol, lisinopril.    7. IBS: Imodium prn.   8. Acute on chronic CHF: Daily weights [phone] lbs. On lasix, continue LUKE hose/tubigrips. Lasix has been reduced to 20mg daily now due to worsening renal function.  F/u with CHF clinic again on 4/5. Edema is improved 1-2+ ble, noncompliant with diet and increased intake recently.   9. MESHA on CKD: Cr 1.38-1.63. Cardiology reduced lasix. Monitor.   10. Muscular pain:No further complaints.  11. Hemorrhoids: Anusol prn. Pt agreeable to non surgical treatment at this time.   12. Hoarding behaviors: Continues to cheri boxes, old tissues, napkins, used plastic silverware, jelly containers, etc in room. Staff has been monitoring for molding and patient has been educated on many occasions. This was reinforced again today but he becomes very irritable and agitated when staff try to throw out garbage.           Electronically signed by: Preeti Conde NP    Total 25 minutes of which 75% was spent in counseling and coordination of care of the above plan    Time spent in interview and examination of patient, review of available records, and discussion with nursing staff. Continue care plan, efforts at therapy, and monitor nutritional status.

## 2021-06-16 NOTE — PROGRESS NOTES
"Patient and sister were seen in clinic for HF education s/p recent hospital discharge 1/28/18.  Reviewed HF Binder that includes the  HF Sx Awareness & Action plan  handout and  A [employer] Pump  booklet and Weight log booklet highlighting :  __X_patient s type of heart failure _X__Na management in diet  __X_importance of daily wts  _X__Fluid Guidelines, if applicable  __X_medication review and importance of compliance     Instructed patient in signs and sx of heart failure, reiterated when to call clinic - reviewed HF hotline # [phone] and after hours call # [phone].  Majority of time was spent reviewing: We went over low sodium diet and make adjustments to his meals at [facility], if possible. His diet was changed to low sodium by Noa and this order will be given to [facility]. Prior to the TCU, he was living at his son's house and eating a lot of \"take-out chicken\". Low sodium diet importance stressed and the importance of daily weights was discussed. Pt ended up agreeing to take HF binder home with him. He reports that he is not good at reading \"health literature\". Encouraged his sister to read, if she is participating in his health care. They verbalized understanding.   Patient verbalized understanding of HF discussion.  Plan for f/u with continued HF education reviewed.  Pt will set up an appt with [facility] in 4 weeks and [facility] in 8 weeks. Pt had lab work done today as well. -[facility] [place]     "

## 2021-06-16 NOTE — PROGRESS NOTES
[facility]    Facility:   [facility] [[id number]]   Code Status: FULL CODE      CHIEF COMPLAINT/REASON FOR VISIT:  Chief Complaint   Patient presents with     Problem Visit     hemorrhoids, epistaxsis, xerodermia       HISTORY:      HPI: Ever is a 72 y.o. male small requesting that I talked with him about his hemorrhoid problem because he thinks that he needs to go to see a surgeon.  He does not have pain or itching and rarely causes blood on the toilet paper.  He also has concern about frequent nosebleeds and also for cracking of the skin of his hands.    Upon other review of systems currently he is not having fevers or chills, he does not have sore throat nor chest pain nor shortness of breath nor cough and does not have abdominal pain or nausea or troubles with bowels or bladder currently.    Past Medical History:   Diagnosis Date     Atrial flutter      Coronary Artery Disease     CABG x2     Diabetic ulcer of both feet 10/31/2017     Dyslipidemia, goal LDL below 70      Foot ulcer      Gangrene of left foot      Hypertension      Neuropathy      Paroxysmal Atrial Fibrillation     Brian Moe: 2011 Cardioversion; CHADS2 = 2 he is on warfarin and sotalol      Type 2 Diabetes Mellitus              Family History   Problem Relation Age of Onset     Sudden death Mother 85     CABG Father      Valvular heart disease Father      Esophageal cancer Father 58      of this     No Medical Problems Son      Social History     Social History     Marital status:      Spouse name: N/A     Number of children: 1     Years of education: N/A     Occupational History      Self Employed     active     Social History Main Topics     Smoking status: Former Smoker     Types: Cigarettes     Quit date: 2000     Smokeless tobacco: Never Used     Alcohol use 0.0 oz/week     2 - 3 Glasses of wine, 2 - 3 Cans of beer per week     Drug use: No     Sexual activity: Yes      Other Topics Concern     Not on file     Social History Narrative    Two granddaughters Haven Gipson    Patient Lives with son Raciel  11/2017             Review of Systems    .  Vitals:    02/19/18 1720   Weight: 193 lb (87.5 kg)       Physical Exam.  Other vital signs in the recent past on February 17 his temperature was 97.5 .  Respirations 24.  Pulse 93.  Blood pressure 127/64.  On exam today he is alert and oriented and pleasant and cooperative and in no acute distress.  There is not any evidence of active bleeding from his nose.  His hands have cracking diffusely and some slight redness but not extreme in nature.  He has several external hemorrhoids most prominent at the 2 o'clock position but no evidence for bleeding and there is not any abnormal tissue involved besides hemorrhoidal tissue.      LABS:   Blood glucose 154    ASSESSMENT:      ICD-10-CM    1. External hemorrhoid K64.4    2. Frequent nosebleeds R04.0    3. Xerodermia Q80.9        PLAN:    I reassured him that the hemorrhoids are a common problem for which more than 20% of the population would have hemorrhoids like his and that I would not advise doing surgical consultation because the opinion has changed in the last 30 years about surgery and since he is essentially asymptomatic there is not a reason to proceed.  There is also risk of fecal incontinence after a surgery like that.  I advised using Vaseline ointment after wiping with moist towel and after bathing or shower.  In terms of the epistaxis, I explained that with the dry air of winter it is a common occurrence and again he can use Vaseline periodically to keep the crusts from cracking and bleeding.  Also in regards to his dry hands I advised avoidance of repetitive washing of his hands and to use the alcohol based skin cleanser as much as possible.  Also to use Vaseline after washing hands.    Electronically signed by: Carl Young MD
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
## 2021-06-16 NOTE — PROGRESS NOTES
Pt is Status post left transmetatarsal amputation was performed on 11/5/17. Pt follows with Miriam for wound care currently.

## 2021-06-16 NOTE — PROGRESS NOTES
Date of Service:3/9/2018    Chief Complaint:   Chief Complaint   Patient presents with     Wound Check       History:    Ever presents to clinic for evaluation of his foot ulcers.  He has a history that is significant for CAD, diabetes, and venous insufficiency. Status post left transmetatarsal amputation was performed on 11/5/17 by Dr. DESTINY Wick.   On 11/9/2017 he was discharged to a TCU where he currently resides. He is able to do full weight bearing on his heel, per Dr. DESTINY Mchugh on January 5.     The care center is applying equal parts of Bactroban and Gentamycin and Mepilex border to the ulcers. He denies pain in his ulcers or feet, but has decreased.  He continues to wear his post op shoes and is eager to get regular shoes. He finished taking his doxycycline as prescribed.    Current Outpatient Prescriptions   Medication Sig Dispense Refill     acetaminophen (TYLENOL) 500 MG tablet Take 2 tablets (1,000 mg total) by mouth every 6 (six) hours as needed.  0     albuterol (PROVENTIL) 2.5 mg /3 mL (0.083 %) nebulizer solution Take 3 mL (2.5 mg total) by nebulization every 6 (six) hours as needed for wheezing or shortness of breath.  0     dextromethorphan-guaiFENesin (ROBITUSSIN-DM)  mg/5 mL liquid Take 10 mL by mouth every 4 (four) hours as needed (cough).       furosemide (LASIX) 40 MG tablet Take 0.5 tablets (20 mg total) by mouth daily as needed (Extra dose at noon eache day if weight gain> 2lbs in 24 hours).  0     furosemide (LASIX) 40 MG tablet Take 1 tablet (40 mg total) by mouth daily.  0     gentamicin (GARAMYCIN) 0.1 % ointment Apply as instructed to ulcer daily 30 g 1     glipiZIDE (GLUCOTROL) 5 MG tablet Take 5 mg by mouth 2 (two) times a day before meals.       Eusebio Therapuetic Nutrition 7-7-1.5 gram PwPk Take 1 packet by mouth daily.       loperamide (IMODIUM) 2 mg capsule Take 1 capsule (2 mg total) by mouth 3 (three) times a day as needed for diarrhea.  0     metoprolol succinate (TOPROL-XL)  50 MG 24 hr tablet Take 50 mg by mouth 2 (two) times a day.        multivitamin with minerals (THERA-M) 9 mg iron-400 mcg Tab tablet Take 1 tablet by mouth daily.       mupirocin (BACTROBAN) 2 % ointment Apply as instructed to ulcer daily 30 g 1     nitroglycerin (NITROSTAT) 0.4 MG SL tablet Place 0.4 mg under the tongue every 5 (five) minutes as needed for chest pain.        omeprazole (PRILOSEC) 20 MG capsule Take 20 mg by mouth daily.        simvastatin (ZOCOR) 40 MG tablet Take 40 mg by mouth at bedtime.        spironolactone (ALDACTONE) 25 MG tablet Take 1 tablet (25 mg total) by mouth daily.  0     urea-alpha hydroxy acids (ATRAC-TAIN, WITH AHA,) 10-4 % Crea Apply 1 application topically daily. Apply after washing legs & feet with water       WARFARIN SODIUM (WARFARIN ORAL) Take by mouth. 2/1/18 INR 2.2  Take 2.5mg daily.  Next INR 2/8/18.       Current Facility-Administered Medications   Medication Dose Route Frequency Provider Last Rate Last Dose     lidocaine 2 % jelly (XYLOCAINE)   Topical PRN Miriam Quinones, CNP   1 application at 02/19/18 0929       Allergies   Allergen Reactions     Latex      Penicillins        Physical Exam:    Vitals:    03/09/18 0957   BP: 118/68   Pulse: 80   Resp: 16   Temp: 97.9  F (36.6  C)    There is no height or weight on file to calculate BMI.    General:  72 y.o. male in no apparent distress.    Psychiatric:  Alert and oriented x 3.  Cooperative.   Integumentary:  Skin is uniformly warm except his left foot amputated site is warm and red.    Lower extremity edema: minimal    Left Foot Ulceration(s):     Wound bed:%100  granulation      Undermining no, Tunneling no   Wound Edge: attached  Periwound:  There is no erika wound erythema, maceration and warmth, but no induration, denudement fluctuance surrounding the ulcer(s).  Exudate:serous          Quantity: minimal  Odor:  absent   Wound Shape oval    Labs:    No results found for: SEDRATE  Lab Results   Component Value  Date    CRP 8.6 (H) 10/31/2017     No components found for: CREATINE  Lab Results   Component Value Date    BUN 44 (H) 02/23/2018     Lab Results   Component Value Date    HGBA1C 6.6 (H) 11/01/2017         Vasc Edema 1/8/2018 1/25/2018 2/8/2018 2/19/2018 3/9/2018   Right just above MTP 27 25.5 23 23 24.5   Right Ankle 26 28 27 26 26.5   Right Widest Calf 35.5 40.5 36 37.5 36.5   Right Thigh Up 10cm - - - - -   Left - just above MTP 29.5 27.5 26 28 28   Left Ankle 24.8 25.7 22 25 24.5   Left Widest Calf 36.5 37.4 33 35 35   Left Thigh Up 10cm - - - - -       Wound 12/22/17 L amp medial (Active)   Pre Size Length 0.7 3/9/2018  9:00 AM   Pre Size Width 1.5 3/9/2018  9:00 AM   Pre Size Depth 0.1 3/9/2018  9:00 AM   Pre Total Sq cm 1.05 3/9/2018  9:00 AM   Post Size Length 0.8 3/9/2018  9:00 AM   Post Size Width 1.8 3/9/2018  9:00 AM   Post Size Depth 0 3/9/2018  9:00 AM       Wound 02/19/18 Left amp site (Active)   Pre Size Length 0 3/9/2018  9:00 AM   Pre Size Width 0 3/9/2018  9:00 AM   Pre Size Depth 0 3/9/2018  9:00 AM   Pre Total Sq cm 0 3/9/2018  9:00 AM       Wound 01/25/18 Non-pressure related ulcer Toe Right (Active)       Wound 01/25/18 Amputation Right;3rd digit (Active)       Wound 01/25/18 Non-pressure related ulcer Foot Left (Active)       Incision 01/25/18 Toe Right;2nd digit (Active)       Assessment:  1. Ulcer of foot, limited to breakdown of skin, unspecified laterality     2. Polyneuropathy associated with underlying disease     3. Ischemic cardiomyopathy         A new wound was identified today: No      Plan:  1.  Debridement of the left leg ulcer was recommended.  After consent was obtained and topical 2% Xylocaine was applied under clean conditions, and using a #15 blade,the devitalized dermal tissue was debrided for a total square centimeters of 0.24.  Following debridement, there was a decrease in the nonviable tissue. The patient tolerated the procedure without any difficulty.     2.  Left foot  ulceration.  Ulcer size is smaller.  There are no signs of infection.     3.  Venous insufficiency, stable.    4.  Dermatitis, resolved with Aquaphor    5.  Edema, stable.      6.  Diabetes, on Metformin.  6.6 A1c on 11/1/2017.      7.  Treatment:  Will discontinue equal parts of Bactroban and Gentamycin and start Aquacel foam adhesive to help retain more moisture.  The dressing can be changed twice per week.  Will discontinue Tubigrip and start Comprilan to help reduce the swelling in his foot. If he does not tolerate the Comprilan, it will be discontinued and Tubigrip restarted.  He will elevate his feet in bed two hours in the am and two hours in the pm.    8.  Patient will follow up with me in 3 weeks for further evaluation and response to the treatment.  If the ulcer fails to improve, I will consider Iodoflex to treat the biofilm.     Miriam Quinones, APRN, CNP,  [facility]  [phone]

## 2021-06-16 NOTE — PROGRESS NOTES
Vascular surgery: This 72-year-old male comes in following his left transmetatarsal amputation.  Generally speaking, he is doing reasonably well with things stable.  He does not, however, have control of edema in his left leg.  He is being followed by the nurse practitioner in our clinic for wound cares.    The patient's feet appear satisfactorily vascularized.  Pulses at the pedal level are clearly decreased however.  The left transmetatarsal has for the most part healed.  There is a superficial wound medially.  Edema is not well controlled.    The patient did have cultures taken and these likely are skin contaminants.  They are sensitive to all antibiotics.    Patient was advised to lie down as much as possible to try to clear the edema and allow us to get some wound healing.  We will see him again in 6-8 weeks, with plans for him to be seen by the wound nurse in the interim.

## 2021-06-16 NOTE — PROGRESS NOTES
Assessment/Plan:     1. Heart failure with preserved ejection fraction, NYHA class II: Ever Crane appears well compensated.  Denies any dyspnea on exertion, orthopnea, or PND.  He has mild fatigue.  We discussed heart failure, following a low salt diet, monitoring weights, and heart failure treatment. He met with a heart failure nurse clinician to discuss heart failure management.  His weights are not being monitored at his facility so I have ordered this today.  BMP pending due to Spironolactone being new.  He will continue Lasix 20 mg twice a day.    Follow-up with Dr. Moe in 4 weeks and in the heart failure clinic in 8 weeks    Subjective:     Ever Crane is seen at [facility] heart failure clinic today for post-hospitalization follow-up.  His sister Miriam accompanies him today.  He was hospitalized at [facility] from January 25 - January 28, 2018 with shortness of breath and weight gain.  His BNP was 492 on admission.  He received IV diuretics during hospitalization which improved symptoms.  The most recent evaluation of His ejection fraction was 55% from an  echo on 1/25/2018.  His past medical history is also significant for hypertension, coronary artery disease, atrial fibrillation, dyslipidemia and diabetes.  He had coronary artery bypass graft in March 2000.    Since being discharged from the hospital, Ever feels that he is improving.  He states he had orthopnea prior to hospitalization which he does not have currently.  He denies any dyspnea on exertion.  He has fatigue and mild edema.  Denies lightheadedness, shortness of breath, dyspnea on exertion, orthopnea, PND, palpitations, chest pain and abdominal fullness/bloating.      His facility is not monitoring weights daily.    Medication reconciliation was done today.    Review of Systems:   General: WNL  Eyes: Visual Distubance  Ears/Nose/Throat: WNL  Lungs: WNL  Heart: WNL  Stomach: WNL  Bladder: WNL  Muscle/Joints: WNL  Skin:  WNL  Nervous System: WNL  Mental Health: WNL     Blood: WNL    Patient Active Problem List   Diagnosis     Dyslipidemia, goal LDL below 70     Persistent atrial fibrillation     Atrial flutter     Venous hypertension, chronic, bilateral     Polyneuropathy associated with underlying disease     Type 2 diabetes mellitus with foot ulcer, without long-term current use of insulin     Acquired lymphedema of lower extremity     Coronary artery disease involving coronary bypass graft of native heart without angina pectoris     Ischemic cardiomyopathy     Unable to function independently     PAD (peripheral artery disease)     S/P transmetatarsal amputation of foot, left     Candidiasis of perineum     Ulcer of foot, limited to breakdown of skin, unspecified laterality     Skin ulcer of toe of left foot, limited to breakdown of skin     Heart failure with preserved ejection fraction     MESHA (acute kidney injury)       Past Medical History:   Diagnosis Date     Atrial flutter      Coronary Artery Disease     CABG x2     Diabetic ulcer of both feet 10/31/2017     Dyslipidemia, goal LDL below 70      Foot ulcer      Gangrene of left foot      Hypertension      Neuropathy      Paroxysmal Atrial Fibrillation     Brian Moe: 2011 Cardioversion; CHADS2 = 2 he is on warfarin and sotalol      Type 2 Diabetes Mellitus        Past Surgical History:   Procedure Laterality Date     CARDIOVERSION  2011     CORONARY ARTERY BYPASS GRAFT  3/6/2000    SVG to OM1, SVG to PDA     FOOT AMPUTATION Left 2017    Procedure: LEFT TRANSMETATARSAL AMPUTATION;  Surgeon: Ever Wick MD;  Location: [facility] [place];  Service:      INGUINAL HERNIA REPAIR Bilateral  and        Family History   Problem Relation Age of Onset     Sudden death Mother 85     CABG Father      Valvular heart disease Father      Esophageal cancer Father 58      of this     No Medical Problems Son        Social History     Social History     Marital  status:      Spouse name: N/A     Number of children: 1     Years of education: N/A     Occupational History      Self Employed     active     Social History Main Topics     Smoking status: Former Smoker     Types: Cigarettes     Quit date: 4/30/2000     Smokeless tobacco: Never Used     Alcohol use 0.0 oz/week     2 - 3 Glasses of wine, 2 - 3 Cans of beer per week     Drug use: No     Sexual activity: Yes     Other Topics Concern     Not on file     Social History Narrative    Two granddaughters Havne Gipson    Patient Lives with son Raciel  11/2017           Current Outpatient Prescriptions   Medication Sig Dispense Refill     acetaminophen (TYLENOL) 500 MG tablet Take 2 tablets (1,000 mg total) by mouth every 6 (six) hours as needed.  0     albuterol (PROVENTIL) 2.5 mg /3 mL (0.083 %) nebulizer solution Take 3 mL (2.5 mg total) by nebulization every 6 (six) hours as needed for wheezing or shortness of breath.  0     dextromethorphan-guaiFENesin (ROBITUSSIN-DM)  mg/5 mL liquid Take 10 mL by mouth every 4 (four) hours as needed (cough).       doxycycline (VIBRA-TABS) 100 MG tablet Take 1 tablet (100 mg total) by mouth 2 (two) times a day for 10 days. 20 tablet 0     furosemide (LASIX) 40 MG tablet Take 0.5 tablets (20 mg total) by mouth 2 (two) times a day at 9am and 6pm.  0     gentamicin (GARAMYCIN) 0.1 % ointment Apply as instructed to ulcer daily 30 g 1     glipiZIDE (GLUCOTROL) 5 MG tablet Take 5 mg by mouth 2 (two) times a day before meals.       Eusebio Therapuetic Nutrition 7-7-1.5 gram PwPk Take 1 packet by mouth daily.       loperamide (IMODIUM) 2 mg capsule Take 1 capsule (2 mg total) by mouth 3 (three) times a day as needed for diarrhea.  0     metoprolol succinate (TOPROL-XL) 50 MG 24 hr tablet Take 50 mg by mouth 2 (two) times a day.        multivitamin with minerals (THERA-M) 9 mg iron-400 mcg Tab tablet Take 1 tablet by mouth daily.       mupirocin (BACTROBAN) 2 % ointment  Apply as instructed to ulcer daily 30 g 1     nitroglycerin (NITROSTAT) 0.4 MG SL tablet Place 0.4 mg under the tongue every 5 (five) minutes as needed for chest pain.        omeprazole (PRILOSEC) 20 MG capsule Take 20 mg by mouth daily.        simvastatin (ZOCOR) 40 MG tablet Take 40 mg by mouth at bedtime.        spironolactone (ALDACTONE) 25 MG tablet Take 1 tablet (25 mg total) by mouth daily.  0     urea-alpha hydroxy acids (ATRAC-TAIN, WITH AHA,) 10-4 % Crea Apply 1 application topically daily. Apply after washing legs & feet with water       WARFARIN SODIUM (WARFARIN ORAL) Take by mouth. 2/1/18 INR 2.2  Take 2.5mg daily.  Next INR 2/8/18.       furosemide (LASIX) 40 MG tablet Take 0.5 tablets (20 mg total) by mouth daily as needed (Extra dose at noon eache day if weight gain> 2lbs in 24 hours).  0     Current Facility-Administered Medications   Medication Dose Route Frequency Provider Last Rate Last Dose     lidocaine 2 % jelly (XYLOCAINE)   Topical PRN Miriam Quinones, CNP   1 application at 02/19/18 0929       Allergies   Allergen Reactions     Latex      Penicillins        Objective:     Vitals:    02/23/18 1407   BP: 102/62   Pulse: 84   Resp: 16     Wt Readings from Last 3 Encounters:   02/23/18 194 lb (88 kg)   01/30/18 187 lb 9.6 oz (85.1 kg)   01/28/18 185 lb 6.4 oz (84.1 kg)       General Appearance:   Alert, cooperative and in no acute distress.   HEENT:  No scleral icterus; the mucous membranes were pink and moist.   Neck: JVP normal. No HJR   Chest: The spine was straight. The chest was symmetric.   Lungs:   Respirations unlabored; the lungs are clear to auscultation.   Cardiovascular:   Regular rhythm. S1 and S2 without murmur, clicks or rubs. Radial and posterior tibial pulses are intact and symmetrical.    Abdomen:  Soft, nontender, nondistended, bowel sounds present   Extremities: No cyanosis. Left pedal edema.   Skin: No xanthelasma.   Neurologic: Mood and affect are appropriate.          Lab Review   Lab Results   Component Value Date    CREATININE 1.38 (H) 01/30/2018    BUN 52 (H) 01/30/2018     01/30/2018    K 4.0 01/30/2018     01/30/2018    CO2 29 01/30/2018     Lab Results   Component Value Date     (H) 01/25/2018     BNP (pg/mL)   Date Value   01/25/2018 492 (H)   01/04/2016 25   06/05/2013 77 (H)     Creatinine (mg/dL)   Date Value   01/30/2018 1.38 (H)   01/29/2018 1.58 (H)   01/28/2018 1.42 (H)   01/27/2018 1.36 (H)       Cardiographics  Echocardiogram: 1/25/2018  Summary     Left ventricle ejection fraction is normal. The calculated left ventricular ejection fraction is 55%.    The following segments are hypokinetic: basal inferolateral. All other segments are normal.    Left Atrium: Left atrial volume is moderately increased.    Aortic Valve: The valve is tricuspid. The aortic valve is sclerotic without reduced excursion. Mild aortic regurgitation.    Estimated central venous pressure equal to 13 mmHg.    The estimated systolic pulmonary artery pressure is 50 mmhg.    When compared to the previous study dated 11/3/2017, no significant change.            40 minutes were face to face spent with the patient with greater than 50% spent on education and counseling.      Noa Schilling, [facility]   Heart Failure Clinic

## 2021-06-17 NOTE — PATIENT INSTRUCTIONS - HE
Patient Instructions by Miriam Quinones CNP at 3/15/2019  1:00 PM     Author: Miriam Quinones CNP Service: -- Author Type: Nurse Practitioner    Filed: 3/15/2019  1:28 PM Encounter Date: 3/15/2019 Status: Addendum    : Miriam Quinones CNP (Nurse Practitioner)    Related Notes: Original Note by Miriam Quinones CNP (Nurse Practitioner) filed at 3/15/2019  1:24 PM           Important Instructions     * Stop using your current products and dressings     For the ulcer on your left medial leg, near the knee, a Tegaderm Foam Adhesive will be applied weekly.    [facility] :  [email or web address]   Phone:  [phone]               How to care for your wound your lateral leg ulcer    Cleanse wound with saline sent to you with your supplies or a wound wash found at the pharmacy.    *   Cut to fit the Endoform and cover with xeroform and place in or on the wound.      *   Cover the wound with ABD    *   Change dressinx/week    *   Apply 2-layer on the left leg for compression. Compression is needed to decrease your swelling and heal any sores.  Apply the tubigrip on the right leg.    For the Medial leg ulcer, fill with silvercel and cover with Tegaderm Foam Adhesive.  Change weekly.  If he is allergic will need to use an ABD and paper tape    *   Do not get your ulcer wet when you shower or take a bath.  You can cover it with cling wrap, a bag taped to your skin or a cast protector.    Apply one of the following moisturizers to your dry and/or cracking skin one to two times daily.  Majority of these are available at the pharmacy downstairs.  If you don't see the item on the shelf, ask the staff about it.        Eucerin original    Eucerin with 5% urea     Eucerin with 10% urea              Atrac-tain Cream with 10% urea      _          O'Keeffe's Healthy Feet       O'Keeffe's Working Hands      Udderly smooth -      This item is not available at the pharmacy  downstairs    *   Good nutrition is important for wound healing.  I recommend increasing your protein.  You can do this through your diet, nutritional supplements, and/or protein powder.      Please call [facility] before substituting any product    Call our clinic nurse at [phone] if there is an increase in drainage, pain, redness, or the wound size increases.  This maybe a sign of infection and require attention prior to the next appointment      Miriam Quinones, APRN, CNP, CWCN

## 2021-06-17 NOTE — PATIENT INSTRUCTIONS - HE
Patient Instructions by Heather Robertson NP at 7/3/2019 11:40 AM     Author: Heather Robertson NP Service: -- Author Type: Nurse Practitioner    Filed: 7/3/2019 12:00 PM Encounter Date: 7/3/2019 Status: Signed    : Heather Robertson NP (Nurse Practitioner)       For the right leg:  Daily wash the leg with soap and water  Lotion daily        Wound Care Instructions    Weekly at the clinic we will Cleanse your left lateral shin wound(s) with Normal Saline or Wound Cleanser    Pat Dry    Apply Lotion to the intact skin surrounding your wound and other dry skin locations. Some good lotions include: Remedy Skin Repair Cream or Cetaphil    Apply silvercel into/onto the wounds    Cover with ABD    Secure with roll gauze as needed    Compression: double tubular compression bilaterally    It is not ok to get your wound wet in the bath or shower    SEEK MEDICAL CARE IF:    You have an increase in swelling, pain, or redness around the wound.    You have an increase in the amount of pus coming from the wound.    There is a bad smell coming from the wound.    The wound appears to be worsening/enlarging    You have a fever greater than 101.5 F      Heather Robertson DNP, RN, CNP, [facility]  [phone]    It is recommended that you do not get your ulcer wet when showering.  Listed below are several ways of keeping it dry when you shower.     1. Wrap it with Press and Seal plastic wrap.  It can be found in the stores where the plastic wraps or tin foil is kept.    2.  Some people take a bath and hang their leg/foot out of the tub.    3  Put your leg in a plastic bag and tape it on.         4. You can purchase a shower cover for casts at some pharmacies and through the Internet.

## 2021-06-17 NOTE — PATIENT INSTRUCTIONS - HE
Patient Instructions by Miriam Quinones CNP at 2019  3:00 PM     Author: Miriam Quinones CNP Service: -- Author Type: Nurse Practitioner    Filed: 2019  4:04 PM Encounter Date: 2019 Status: Addendum    : Miriam Quinones CNP (Nurse Practitioner)    Related Notes: Original Note by Miriam Quinones CNP (Nurse Practitioner) filed at 2019  3:58 PM           Important Instructions     * Stop using your current products and dressings    Stop TMC                    How to care for your wound    Cleanse wound with saline sent to you with your supplies or a wound wash found at the pharmacy.    *   Cut to fit the silverces and place in or on the wound.      *   Cover the wound with ABD    *   Change dressinx/week    *   Apply 2-layer on the left leg for compression. Compression is needed to decrease your swelling and heal any sores.      For the Medial leg ulcer, fill with silvercel and cover with Tegaderm Foam Adhesive.  Change weekly.  If he is allergic will need to use an ABD and paper tape    *   Do not get your ulcer wet when you shower or take a bath.  You can cover it with cling wrap, a bag taped to your skin or a cast protector.    Apply one of the following moisturizers to your dry and/or cracking skin one to two times daily.  Majority of these are available at the pharmacy downstairs.  If you don't see the item on the shelf, ask the staff about it.        Eucerin original    Eucerin with 5% urea     Eucerin with 10% urea              Atrac-tain Cream with 10% urea      _          O'Keeffe's Healthy Feet       O'Keeffe's Working Hands      Udderly smooth -      This item is not available at the pharmacy downstairs    *   Good nutrition is important for wound healing.  I recommend increasing your protein.  You can do this through your diet, nutritional supplements, and/or protein powder.      Please call [facility] before substituting any  product    Call our clinic nurse at [phone] if there is an increase in drainage, pain, redness, or the wound size increases.  This maybe a sign of infection and require attention prior to the next appointment      KEVIN Ness, CNP, CWCN

## 2021-06-17 NOTE — PROGRESS NOTES
f/u left transmetatarsal amputation.  11/5/17, he is followed by [facility] for wound management, edema issues

## 2021-06-17 NOTE — PROGRESS NOTES
Patient and his sister were seen in clinic for continued HF education.  Patient viewed 'What is heart Failure' video which covers risk factors, symptoms, medications, Na and fluid guidelines, balancing activity and rest, and daily monitoring of symptoms.    Addressed patients questions/concerns- will continue to reinforce HF education with future patient interactions.  They will f/up with Noa in 3 months and Dr. Moe [place] [place]

## 2021-06-17 NOTE — PATIENT INSTRUCTIONS - HE
Patient Instructions by Miriam Quinones CNP at 2019  3:00 PM     Author: Miriam Quinones CNP Service: -- Author Type: Nurse Practitioner    Filed: 2019  3:02 PM Encounter Date: 2019 Status: Addendum    : Miriam Quinones CNP (Nurse Practitioner)    Related Notes: Original Note by Miriam Quinones CNP (Nurse Practitioner) filed at 2019  2:53 PM           Important Instructions     * Stop using your current products and dressings     For the ulcer on your left medial leg, near the knee, a Tegaderm Foam Adhesive will be applied weekly.               How to care for your wound your lateral leg ulcer    Cleanse wound with saline sent to you with your supplies or a wound wash found at the pharmacy.    *   Cut to fit the Endoform and place in or on the wound.      *   Cover the wound with ABD    *   Change dressinx/week    *   Apply 2-layer on the left leg for compression. Compression is needed to decrease your swelling and heal any sores.      For the Medial leg ulcer, fill with silvercel and cover with Tegaderm Foam Adhesive.  Change weekly.  If he is allergic will need to use an ABD and paper tape    *   Do not get your ulcer wet when you shower or take a bath.  You can cover it with cling wrap, a bag taped to your skin or a cast protector.    Apply one of the following moisturizers to your dry and/or cracking skin one to two times daily.  Majority of these are available at the pharmacy downstairs.  If you don't see the item on the shelf, ask the staff about it.        Eucerin original    Eucerin with 5% urea     Eucerin with 10% urea              Atrac-tain Cream with 10% urea      _          O'Keeffe's Healthy Feet       O'Keeffe's Working Hands      Udderly smooth -      This item is not available at the pharmacy downstairs    *   Good nutrition is important for wound healing.  I recommend increasing your protein.  You can do this through your diet, nutritional  supplements, and/or protein powder.      Please call [facility] before substituting any product    Call our clinic nurse at [phone] if there is an increase in drainage, pain, redness, or the wound size increases.  This maybe a sign of infection and require attention prior to the next appointment      Miriam Quinones, APRN, CNP, CWCN

## 2021-06-17 NOTE — PATIENT INSTRUCTIONS - HE
Patient Instructions by Heather Robertson NP at 5/29/2019  1:20 PM     Author: Heather Robertson NP Service: -- Author Type: Nurse Practitioner    Filed: 5/29/2019  1:53 PM Encounter Date: 5/29/2019 Status: Addendum    : Heather Robertson NP (Nurse Practitioner)    Related Notes: Original Note by Heather Robertson NP (Nurse Practitioner) filed at 5/29/2019  1:51 PM       For the right leg:  Daily wash the leg with soap and water  Lotion daily        Wound Care Instructions    Weekly at the clinic we will Cleanse your left lateral shin wound(s) with Normal Saline or Wound Cleanser    Pat Dry    Apply Lotion to the intact skin surrounding your wound and other dry skin locations. Some good lotions include: Remedy Skin Repair Cream or Cetaphil    Apply silvercel into/onto the wounds    Cover with ABD    Secure with roll gauze as needed    Compression: double tubular compression bilaterally    It is not ok to get your wound wet in the bath or shower    SEEK MEDICAL CARE IF:    You have an increase in swelling, pain, or redness around the wound.    You have an increase in the amount of pus coming from the wound.    There is a bad smell coming from the wound.    The wound appears to be worsening/enlarging    You have a fever greater than 101.5 F      Heather Robertson DNP, RN, CNP, [facility]  [phone]    It is recommended that you do not get your ulcer wet when showering.  Listed below are several ways of keeping it dry when you shower.     1. Wrap it with Press and Seal plastic wrap.  It can be found in the stores where the plastic wraps or tin foil is kept.    2.  Some people take a bath and hang their leg/foot out of the tub.    3  Put your leg in a plastic bag and tape it on.         4. You can purchase a shower cover for casts at some pharmacies and through the Internet.

## 2021-06-17 NOTE — PROGRESS NOTES
[facility]    DATE: 2018    NAME:  Ever Crane             :  1945  MRN: [id number]  CODE STATUS:  FULL CODE    VISIT TYPE: Review Of Multiple Medical Conditions     FACILITY:  [facility] [[id number]]       CHIEF COMPLAIN/REASON FOR VISIT:    Chief Complaint   Patient presents with     Review Of Multiple Medical Conditions               HISTORY OF PRESENT ILLNESS: Ever Crane is a 73 y.o. male who is a long term care resident of [facility]. He has PMH of PVD with transmetetarsal amputation of left foot on , Type 2 DM, venous stasis ulcers, Atrial flutter, CAD, CABG x2, HLD, HTN, neuropathy, PAF.     Today Mr. Crane states he finally passed his 's test last week. He had to take the driving part 4 times before he past. He said he heard on the radio someone took it 30 times before they passed but that was in another state. He says he is not having much pain. He did see wound care the other day and they thought his left foot ulcer was bigger but he thinks it just wasn't any better. He says they did culture it and are deciding about an antibiotic. He did go see neurology about his neuropathy and needed testing done to apply for housing assistance. He says his  is still working on finding him a new place. He says he has the yeast infection in his groin again and was going to drive to [employer] [facility] today to get some powder because he is out. He says he still has trouble with his hemorrhoids and he didn't like the consistency of the cream that was ordered. So he is using some udder cream that he got at the store. He says if this doesn't work he doesn't know what he would do. He is not interested in the suppositories at this time. He goes back to wound care in 2 weeks. Per nursing his blood sugars are running 100s in am and 300-400s in evening. He leaves facility frequently since having his driving permit and license again. He does  not eat many meals in the dining kwan. His vitals have been stable.     REVIEW OF SYSTEMS:  PROBLEMS AND REVIEW OF SYSTEMS:   Review of Systems  Today on ROS:   Currently, no fever, chills, or rigors. Does not have any visual or hearing problems. Denies any chest pain, headaches, palpitations, lightheadedness, dizziness. Appetite is good. Denies any GERD symptoms. Denies any difficulty with swallowing, nausea, or vomiting.  Denies any abdominal pain, diarrhea or constipation. Denies any urinary symptoms. No insomnia. No active bleeding. Wounds on feet/legs, denies sob today, positive for leg edema, hemorrhoids, yeast in groin      Allergies   Allergen Reactions     Latex      Penicillins      Current Outpatient Prescriptions   Medication Sig     acetaminophen (TYLENOL) 500 MG tablet Take 2 tablets (1,000 mg total) by mouth every 6 (six) hours as needed.     albuterol (PROVENTIL) 2.5 mg /3 mL (0.083 %) nebulizer solution Take 3 mL (2.5 mg total) by nebulization every 6 (six) hours as needed for wheezing or shortness of breath.     dextromethorphan-guaiFENesin (ROBITUSSIN-DM)  mg/5 mL liquid Take 10 mL by mouth every 4 (four) hours as needed (cough).     furosemide (LASIX) 40 MG tablet Take 0.5 tablets (20 mg total) by mouth daily as needed (Extra dose at noon eache day if weight gain> 2lbs in 24 hours).     furosemide (LASIX) 40 MG tablet Take 1 tablet (40 mg total) by mouth daily.     gentamicin (GARAMYCIN) 0.1 % ointment Apply as instructed to ulcer daily     glipiZIDE (GLUCOTROL) 5 MG tablet Take 5 mg by mouth 2 (two) times a day before meals.     Eusebio Therapuetic Nutrition 7-7-1.5 gram PwPk Take 1 packet by mouth daily.     loperamide (IMODIUM) 2 mg capsule Take 1 capsule (2 mg total) by mouth 3 (three) times a day as needed for diarrhea.     metoprolol succinate (TOPROL-XL) 50 MG 24 hr tablet Take 50 mg by mouth 2 (two) times a day.      multivitamin with minerals (THERA-M) 9 mg iron-400 mcg Tab tablet  Take 1 tablet by mouth daily.     mupirocin (BACTROBAN) 2 % ointment Apply as instructed to ulcer daily     nitroglycerin (NITROSTAT) 0.4 MG SL tablet Place 0.4 mg under the tongue every 5 (five) minutes as needed for chest pain.      omeprazole (PRILOSEC) 20 MG capsule Take 20 mg by mouth daily.      simvastatin (ZOCOR) 40 MG tablet Take 40 mg by mouth at bedtime.      spironolactone (ALDACTONE) 25 MG tablet Take 1 tablet (25 mg total) by mouth daily.     urea-alpha hydroxy acids (ATRAC-TAIN, WITH AHA,) 10-4 % Crea Apply 1 application topically daily. Apply after washing legs & feet with water     WARFARIN SODIUM (WARFARIN ORAL) Take by mouth. 3/26/18 INR 2.9 Cont 3.5mg qd. Next INR 4/2.  3/21/18 INR 3.1 Cont 3.5mg qd. Next INR 3/26.  3/15/18 INR 1.6 3.5mg qd. Next INR 3/21.     Past Medical History:    Past Medical History:   Diagnosis Date     Atrial flutter      Coronary Artery Disease     CABG x2     Diabetic ulcer of both feet 10/31/2017     Dyslipidemia, goal LDL below 70      Foot ulcer      Gangrene of left foot      Hypertension      Neuropathy      Paroxysmal Atrial Fibrillation     Brian Moe: 8/2011 Cardioversion; CHADS2 = 2 he is on warfarin and sotalol      Type 2 Diabetes Mellitus            PHYSICAL EXAMINATION  Vitals:    04/17/18 2129   BP: 124/73   Pulse: 62   Resp: 22   Temp: 97.3  F (36.3  C)   SpO2: 98%     Today on physical exam:      GENERAL: Awake, Alert, oriented x3, not in any form of acute distress, answers questions appropriately, follows simple commands, conversant  HEENT: Head is normocephalic with normal hair distribution. No evidence of trauma. Ears: No acute purulent discharge. Eyes: Conjunctivae pink with no scleral jaundice. Nose: Normal mucosa and septum. NECK: Supple with no cervical or supraclavicular lymphadenopathy. Trachea is midline.   CHEST: No tenderness or deformity, no crepitus  LUNG: dim to auscultation with good chest expansion. There are no crackles or wheezes,  normal AP diameter.  BACK: No kyphosis of the thoracic spine. Symmetric, no curvature, ROM normal, no CVA tenderness, no spinal tenderness   CVS: irregularly irregular, rhythm, there are no murmurs, rubs, gallops, or heaves,  2+ pulses symmetric in all extremities.  ABDOMEN: Rounded and soft, nontender to palpation, non distended, no masses, no organomegaly, good bowel sounds, no rebound or guarding, no peritoneal signs.   EXTREMITIES: 1-2+ ble pedal edema, denisse discoloration ble, transmetatarsal amputation left foot site and wounds covered with dressing, scattered venous stasis ulcers  SKIN: Warm and dry, no erythema noted.  Skin color, texture,perineal rash maculopapular   NEUROLOGICAL: The patient is oriented to person, place and time. Strength and sensation are grossly intact. Face is symmetric.Irritable at times, noncompliant at times. Irrational judgement at times. BLE neuropathy            LABS:   Recent Results (from the past 168 hour(s))   Thyroid Stimulating Hormone (TSH)   Result Value Ref Range    TSH 4.46 0.30 - 5.00 uIU/mL   Vitamin B12   Result Value Ref Range    Vitamin B-12 300 213 - 816 pg/mL   Antinuclear Antibodies Screen (SEDRICK)   Result Value Ref Range    SEDRICK Screen Cascade 1.5 <=2.9 U   SS-A/RO Auto Antibodies   Result Value Ref Range    SS-A/Ro Autoantibodies 13 <20 EU   SS-B/LA Auto Antibodies   Result Value Ref Range    SS-B/La Autoantibodies 1 <20 EU   Rheumatoid Factor Screen   Result Value Ref Range    Rheumatoid Factor Quantitative <15.0 0 - 30 IU/mL   Culture/Gram Stain: Wound   Result Value Ref Range    Gram Stain Result No polymorphonuclear leukocytes seen     Gram Stain Result No organisms seen      Results for orders placed or performed in visit on 03/12/18   Basic Metabolic Panel   Result Value Ref Range    Sodium 140 136 - 145 mmol/L    Potassium 4.4 3.5 - 5.0 mmol/L    Chloride 109 (H) 98 - 107 mmol/L    CO2 24 22 - 31 mmol/L    Anion Gap, Calculation 7 5 - 18 mmol/L    Glucose  120 70 - 125 mg/dL    Calcium 9.2 8.5 - 10.5 mg/dL    BUN 30 (H) 8 - 28 mg/dL    Creatinine 1.12 0.70 - 1.30 mg/dL    GFR MDRD Af Amer >60 >60 mL/min/1.73m2    GFR MDRD Non Af Amer >60 >60 mL/min/1.73m2         Lab Results   Component Value Date    WBC 7.3 01/25/2018    HGB 11.5 (L) 01/25/2018    HCT 35.2 (L) 01/25/2018    MCV 83 01/25/2018     01/25/2018       Lab Results   Component Value Date    OHKXAPVZ64 300 04/11/2018     Lab Results   Component Value Date    HGBA1C 6.6 (H) 11/01/2017     Lab Results   Component Value Date    INR 1.72 (H) 01/28/2018    INR 1.98 (H) 01/27/2018    INR 2.13 (H) 01/26/2018     No results found for: [id number]  Lab Results   Component Value Date    TSH 4.46 04/11/2018           ASSESSMENT/PLAN:    1. Candidiasis of perineal region: Had resolved and nystatin powder/cream dc'd. Will order nystatin powder again.   2. Left transmetatarsal amputation, severe PVD, venous stasis ulcers: Continues to follow with vascular clinic, last saw 4/16-Left foot ulcer larger, culture taken, awaiting results for antibiotic recommendation. F/u again 4/30. Continue wound care orders per clinic. Saw Dr. Wick 4/6-doing well f/u again 4-6 weeks.   3. Type 2 DM: -315. Continue glipizide. Noncompliant with diet. Increased glipizide to 10mg in am, continue 5mg in pm.   4. CAD, CABG: On metoprolol, nitrostat, simvastatin. F/u with cardiology on 4/24.   5. Atrial flutter: On metoprolol, rate controlled 60s. On warfarin.   6. HTN: SBP 120s. Stable on lasix, metoprolol, lisinopril.    7. IBS: Imodium prn.   8. Acute on chronic CHF: Daily weights [phone] lbs. On lasix, continue LUKE hose/tubigrips. On lasix. F/u with cardiology 4/25. Edema is improved 1-2+ ble, noncompliant with diet at times.   9. MESHA on CKD: Cr 1.38-1.63 now 1.12 on 3/12. Monitor.   10. Muscular pain:No further complaints.  11. Hemorrhoids: Anusol cream-pt refusing to use, using over the counter udder cream instead.  Resistant to suppositories at this time.   12. Hoarding behaviors: Continues to cheri boxes, old tissues, napkins, used plastic silverware, jelly containers, etc in room. Staff has been monitoring for molding and patient has been educated on many occasions.   13. Neuropathy: BLE. F/u with neurology.     CMP, CBC, HGa1c, on 4/20    Electronically signed by: Preeti Conde NP    Total 35 minutes of which 75% was spent in counseling and coordination of care of the above plan    Time spent in interview and examination of patient, review of available records, and discussion with nursing staff. Continue care plan, efforts at therapy, and monitor nutritional status.

## 2021-06-17 NOTE — PROGRESS NOTES
Date of Service:4/30/2018    Chief Complaint:   Chief Complaint   Patient presents with     Wound Check       History:    Ever presents to clinic for evaluation of his foot ulcers.  He has a history that is significant for CAD, diabetes, and venous insufficiency. Status post left transmetatarsal amputation was performed on 11/5/17 by Dr. DESTINY Wick.   On 11/9/2017 he was discharged to a TCU where he currently resides. He is able to do full weight bearing on his heel, per Dr. DESTINY Mchugh on January 5.     The care center is applying iodoflex and mepilex border to the ulcer twice per week. He started taking Bactrim that was prescribed as a result of his culture.  His drainage is down and he is not having any pain in the ulcer.   He is wearing boots when he goes out side and regular shoes in spite of instruction to avoid wearing anything that would cause pressure on his foot.  Recommended a hard soled slipper.    Current Outpatient Prescriptions   Medication Sig Dispense Refill     acetaminophen (TYLENOL) 500 MG tablet Take 2 tablets (1,000 mg total) by mouth every 6 (six) hours as needed.  0     albuterol (PROVENTIL) 2.5 mg /3 mL (0.083 %) nebulizer solution Take 3 mL (2.5 mg total) by nebulization every 6 (six) hours as needed for wheezing or shortness of breath.  0     dextromethorphan-guaiFENesin (ROBITUSSIN-DM)  mg/5 mL liquid Take 10 mL by mouth every 4 (four) hours as needed (cough).       furosemide (LASIX) 40 MG tablet Take 0.5 tablets (20 mg total) by mouth daily as needed (Extra dose at noon eache day if weight gain> 2lbs in 24 hours).  0     furosemide (LASIX) 40 MG tablet Take 1 tablet (40 mg total) by mouth daily.  0     gentamicin (GARAMYCIN) 0.1 % ointment Apply as instructed to ulcer daily 30 g 1     glipiZIDE (GLUCOTROL) 5 MG tablet Take 5 mg by mouth 2 (two) times a day before meals.       Eusebio Therapuetic Nutrition 7-7-1.5 gram PwPk Take 1 packet by mouth daily.       lisinopril  (PRINIVIL,ZESTRIL) 10 MG tablet Take 10 mg by mouth daily.       loperamide (IMODIUM) 2 mg capsule Take 1 capsule (2 mg total) by mouth 3 (three) times a day as needed for diarrhea.  0     metoprolol succinate (TOPROL-XL) 50 MG 24 hr tablet Take 50 mg by mouth 2 (two) times a day.        multivitamin with minerals (THERA-M) 9 mg iron-400 mcg Tab tablet Take 1 tablet by mouth daily.       mupirocin (BACTROBAN) 2 % ointment Apply as instructed to ulcer daily 30 g 1     nitroglycerin (NITROSTAT) 0.4 MG SL tablet Place 0.4 mg under the tongue every 5 (five) minutes as needed for chest pain.        omeprazole (PRILOSEC) 20 MG capsule Take 20 mg by mouth daily.        simvastatin (ZOCOR) 40 MG tablet Take 40 mg by mouth at bedtime.        spironolactone (ALDACTONE) 25 MG tablet Take 1 tablet (25 mg total) by mouth daily.  0     urea-alpha hydroxy acids (ATRAC-TAIN, WITH AHA,) 10-4 % Crea Apply 1 application topically daily. Apply after washing legs & feet with water       WARFARIN SODIUM (WARFARIN ORAL) Take by mouth. 3/26/18 INR 2.9 Cont 3.5mg qd. Next INR 4/2.  3/21/18 INR 3.1 Cont 3.5mg qd. Next INR 3/26.  3/15/18 INR 1.6 3.5mg qd. Next INR 3/21.       Current Facility-Administered Medications   Medication Dose Route Frequency Provider Last Rate Last Dose     lidocaine 2 % jelly (XYLOCAINE)   Topical PRN Miriam Quinones, CNP   1 application at 02/19/18 0929       Allergies   Allergen Reactions     Latex      Penicillins        Physical Exam:    Vitals:    04/30/18 1446   BP: 120/68   Pulse: 80   Temp: 98.3  F (36.8  C)    There is no height or weight on file to calculate BMI.    General:  73 y.o. male in no apparent distress.    Psychiatric:  Alert and oriented x 3.  Cooperative.   Integumentary:  Skin is uniformly warm and dry  Lower extremity edema: minimal    Left Foot Ulceration(s):     Wound bed:%100  granulation      Undermining no, Tunneling no   Wound Edge: attached  Periwound:  There is no erika wound  erythema and maceration but no warmth, induration, denudement fluctuance surrounding the ulcer(s).  Exudate:serous          Quantity: minimal  Odor:  absent   Wound Shape oval    Labs:    No results found for: SEDRATE  Lab Results   Component Value Date    CRP 8.6 (H) 10/31/2017     No components found for: CREATINE  Lab Results   Component Value Date    BUN 40 (H) 04/20/2018     Lab Results   Component Value Date    HGBA1C 7.3 (H) 04/20/2018         Vasc Edema 1/25/2018 2/8/2018 2/19/2018 3/9/2018 4/2/2018   Right just above MTP 25.5 23 23 24.5 27.4   Right Ankle 28 27 26 26.5 24.4   Right Widest Calf 40.5 36 37.5 36.5 34   Right Thigh Up 10cm - - - - -   Left - just above MTP 27.5 26 28 28 24.2   Left Ankle 25.7 22 25 24.5 25.5   Left Widest Calf 37.4 33 35 35 35.3   Left Thigh Up 10cm - - - - -       Wound 12/22/17 L amp medial (Active)   Pre Size Length 0.6 4/30/2018  2:00 PM   Pre Size Width 0.3 4/30/2018  2:00 PM   Pre Size Depth 0 4/16/2018 10:00 AM   Pre Total Sq cm 2.25 4/16/2018 10:00 AM   Post Size Length 0.5 4/30/2018  2:00 PM   Post Size Width 1.4 4/30/2018  2:00 PM   Post Size Depth 0.1 4/30/2018  2:00 PM       Wound 01/25/18 Non-pressure related ulcer Toe Right (Active)       Wound 01/25/18 Amputation Right;3rd digit (Active)       Wound 01/25/18 Non-pressure related ulcer Foot Left (Active)       Incision 01/25/18 Toe Right;2nd digit (Active)       Assessment:  1. Ulcer of foot, limited to breakdown of skin, unspecified laterality     2. S/P transmetatarsal amputation of foot, left     3. Polyneuropathy associated with underlying disease     4. Venous hypertension, chronic, bilateral         A new wound was identified today: No      Plan:  1.  Debridement of the left leg ulcer was recommended.  After consent was obtained and topical 2% Xylocaine was applied under clean conditions, and using a #15 blade,the devitalized dermal tissue was debrided for a total square centimeters of 0.18.  Following  debridement, there was a decrease in the nonviable tissue. The patient tolerated the procedure without any difficulty.     2.  Left foot ulceration.  Ulcer size is larger. Using the Puga Technique, I obtained an aerobic culture and sensitivity today.  I will wait for the culture results before prescribing an antibiotic.    3.  Venous insufficiency,  Stable    4.  Dermatitis, resolved with Aquaphor    5.  Edema, slightly less    6.  Diabetes, on Metformin.  6.6 A1c on 11/1/2017.      7.  Treatment:  Will continue with Iodoflex to decrease the biofilm.  It will be covered with Kerra Dayo and changed  every other day.  Encouraged the use of hard soled slippers because he does not like the post op shoe.     8.  Patient will follow up with me in 2-3 weeks for further evaluation and response to the treatment.  If the ulcer fails to improve, I will consider daily Bactroban and gentamycin.      Miriam Quinones, APRN, CNP,  [facility]  [phone]

## 2021-06-17 NOTE — PROGRESS NOTES
follow up for left amp site, amp 11/17 - healing wound, seen by MO for wound management,     HX: CAD, PAD, venous hypertension, MESHA, acquired lymphedema, dyslipidemia, type 2 diabetes, MEDS: zocor, warfarin YES

## 2021-06-17 NOTE — PROGRESS NOTES
Date of Service:4/16/2018    Chief Complaint:   Chief Complaint   Patient presents with     Follow-up       History:    Ever presents to clinic for evaluation of his foot ulcers.  He has a history that is significant for CAD, diabetes, and venous insufficiency. Status post left transmetatarsal amputation was performed on 11/5/17 by Dr. DESTINY Wick.   On 11/9/2017 he was discharged to a TCU where he currently resides. He is able to do full weight bearing on his heel, per Dr. DESTINY Mchugh on January 5.     The care center is applying silvercel and mepilex border to the ulcer twice per week. Yesterday, Ever added a piece of paper towel under the mepilex border to absorb some of the drainage.  He is not wearing his Tubigrip.  He denies pain in his ulcers or feet, but has decreased sensation.  He is wearing boots and regular shoes because his post op shoe that he was wearing is worn out.    Current Outpatient Prescriptions   Medication Sig Dispense Refill     acetaminophen (TYLENOL) 500 MG tablet Take 2 tablets (1,000 mg total) by mouth every 6 (six) hours as needed.  0     albuterol (PROVENTIL) 2.5 mg /3 mL (0.083 %) nebulizer solution Take 3 mL (2.5 mg total) by nebulization every 6 (six) hours as needed for wheezing or shortness of breath.  0     dextromethorphan-guaiFENesin (ROBITUSSIN-DM)  mg/5 mL liquid Take 10 mL by mouth every 4 (four) hours as needed (cough).       furosemide (LASIX) 40 MG tablet Take 0.5 tablets (20 mg total) by mouth daily as needed (Extra dose at noon eache day if weight gain> 2lbs in 24 hours).  0     furosemide (LASIX) 40 MG tablet Take 1 tablet (40 mg total) by mouth daily.  0     gentamicin (GARAMYCIN) 0.1 % ointment Apply as instructed to ulcer daily 30 g 1     glipiZIDE (GLUCOTROL) 5 MG tablet Take 5 mg by mouth 2 (two) times a day before meals.       Eusebio Therapuetic Nutrition 7-7-1.5 gram PwPk Take 1 packet by mouth daily.       loperamide (IMODIUM) 2 mg capsule Take 1 capsule (2 mg  total) by mouth 3 (three) times a day as needed for diarrhea.  0     metoprolol succinate (TOPROL-XL) 50 MG 24 hr tablet Take 50 mg by mouth 2 (two) times a day.        multivitamin with minerals (THERA-M) 9 mg iron-400 mcg Tab tablet Take 1 tablet by mouth daily.       mupirocin (BACTROBAN) 2 % ointment Apply as instructed to ulcer daily 30 g 1     nitroglycerin (NITROSTAT) 0.4 MG SL tablet Place 0.4 mg under the tongue every 5 (five) minutes as needed for chest pain.        omeprazole (PRILOSEC) 20 MG capsule Take 20 mg by mouth daily.        simvastatin (ZOCOR) 40 MG tablet Take 40 mg by mouth at bedtime.        spironolactone (ALDACTONE) 25 MG tablet Take 1 tablet (25 mg total) by mouth daily.  0     urea-alpha hydroxy acids (ATRAC-TAIN, WITH AHA,) 10-4 % Crea Apply 1 application topically daily. Apply after washing legs & feet with water       WARFARIN SODIUM (WARFARIN ORAL) Take by mouth. 3/26/18 INR 2.9 Cont 3.5mg qd. Next INR 4/2.  3/21/18 INR 3.1 Cont 3.5mg qd. Next INR 3/26.  3/15/18 INR 1.6 3.5mg qd. Next INR 3/21.       Current Facility-Administered Medications   Medication Dose Route Frequency Provider Last Rate Last Dose     lidocaine 2 % jelly (XYLOCAINE)   Topical PRN Miriam Quinones, CNP   1 application at 02/19/18 0929       Allergies   Allergen Reactions     Latex      Penicillins        Physical Exam:    Vitals:    04/16/18 1033   BP: 112/60   Pulse: 88   Resp: 20   Temp: 98  F (36.7  C)    There is no height or weight on file to calculate BMI.    General:  73 y.o. male in no apparent distress.    Psychiatric:  Alert and oriented x 3.  Cooperative.   Integumentary:  Skin is uniformly warm except his left foot amputated site is warm and red.    Lower extremity edema: minimal    Left Foot Ulceration(s):     Wound bed:%100  granulation      Undermining no, Tunneling no   Wound Edge: attached  Periwound:  There is slight erika wound erythema and maceration but no warmth, induration, denudement  fluctuance surrounding the ulcer(s).  Exudate:serous          Quantity: minimal  Odor:  absent   Wound Shape oval    Labs:    No results found for: SEDRATE  Lab Results   Component Value Date    CRP 8.6 (H) 10/31/2017     No components found for: CREATINE  Lab Results   Component Value Date    BUN 30 (H) 03/12/2018     Lab Results   Component Value Date    HGBA1C 6.6 (H) 11/01/2017         Vasc Edema 1/25/2018 2/8/2018 2/19/2018 3/9/2018 4/2/2018   Right just above MTP 25.5 23 23 24.5 27.4   Right Ankle 28 27 26 26.5 24.4   Right Widest Calf 40.5 36 37.5 36.5 34   Right Thigh Up 10cm - - - - -   Left - just above MTP 27.5 26 28 28 24.2   Left Ankle 25.7 22 25 24.5 25.5   Left Widest Calf 37.4 33 35 35 35.3   Left Thigh Up 10cm - - - - -       Wound 12/22/17 L amp medial (Active)   Pre Size Length 0.9 4/16/2018 10:00 AM   Pre Size Width 2.5 4/16/2018 10:00 AM   Pre Size Depth 0 4/16/2018 10:00 AM   Pre Total Sq cm 2.25 4/16/2018 10:00 AM   Post Size Length 1 4/16/2018 10:00 AM   Post Size Width 2.2 4/16/2018 10:00 AM   Post Size Depth 0.2 4/16/2018 10:00 AM       Wound 01/25/18 Non-pressure related ulcer Toe Right (Active)       Wound 01/25/18 Amputation Right;3rd digit (Active)       Wound 01/25/18 Non-pressure related ulcer Foot Left (Active)       Incision 01/25/18 Toe Right;2nd digit (Active)       Assessment:  1. Ulcer of foot, limited to breakdown of skin, unspecified laterality     2. S/P transmetatarsal amputation of foot, left     3. Polyneuropathy associated with underlying disease     4. Ischemic cardiomyopathy     5. PAD (peripheral artery disease)     6. Venous hypertension, chronic, bilateral     7. Local infection of wound  Culture/Gram Stain: Wound       A new wound was identified today: No      Plan:  1.  Debridement of the left leg ulcer was recommended.  After consent was obtained and topical 2% Xylocaine was applied under clean conditions, and using a #15 blade,the devitalized dermal tissue was  debrided for a total square centimeters of 2.2.  Following debridement, there was a decrease in the nonviable tissue. The patient tolerated the procedure without any difficulty.     2.  Left foot ulceration.  Ulcer size is larger. Using the Puga Technique, I obtained an aerobic culture and sensitivity today.  I will wait for the culture results before prescribing an antibiotic.    3.  Venous insufficiency,  Needs consistent measurements    4.  Dermatitis, resolved with Aquaphor    5.  Edema, slightly less    6.  Diabetes, on Metformin.  6.6 A1c on 11/1/2017.      7.  Treatment:  Will discontinue Tegaderm AG Mesh and start Iodoflex to decrease the biofilm.  It will be covered with Kerra Dayo and changed  every other day.  Will continue with comprilan for compression and provide a new postop shoe.      8.  Patient will follow up with me in 2 weeks for further evaluation and response to the treatment.  If the ulcer fails to improve, I will consider daily Bactroban and gentamycin.  Miriam Quinones, APRN, CNP,  [facility]  [phone]

## 2021-06-17 NOTE — PROGRESS NOTES
Date of Service:4/2/2018    Chief Complaint:   Chief Complaint   Patient presents with     Follow-up       History:    Ever presents to clinic for evaluation of his foot ulcers.  He has a history that is significant for CAD, diabetes, and venous insufficiency. Status post left transmetatarsal amputation was performed on 11/5/17 by Dr. DESTINY Wick.   On 11/9/2017 he was discharged to a TCU where he currently resides. He is able to do full weight bearing on his heel, per Dr. DESTINY Mchugh on January 5.     The care center is applying Aquacel and mepilex border to the ulcer twice per week.  He is wearing Tubigrip without any difficulty  He denies pain in his ulcers or feet, but has decreased.  He continues to wear his post op shoes and is eager to get regular shoes.     Current Outpatient Prescriptions   Medication Sig Dispense Refill     acetaminophen (TYLENOL) 500 MG tablet Take 2 tablets (1,000 mg total) by mouth every 6 (six) hours as needed.  0     albuterol (PROVENTIL) 2.5 mg /3 mL (0.083 %) nebulizer solution Take 3 mL (2.5 mg total) by nebulization every 6 (six) hours as needed for wheezing or shortness of breath.  0     dextromethorphan-guaiFENesin (ROBITUSSIN-DM)  mg/5 mL liquid Take 10 mL by mouth every 4 (four) hours as needed (cough).       furosemide (LASIX) 40 MG tablet Take 0.5 tablets (20 mg total) by mouth daily as needed (Extra dose at noon eache day if weight gain> 2lbs in 24 hours).  0     furosemide (LASIX) 40 MG tablet Take 1 tablet (40 mg total) by mouth daily.  0     gentamicin (GARAMYCIN) 0.1 % ointment Apply as instructed to ulcer daily 30 g 1     glipiZIDE (GLUCOTROL) 5 MG tablet Take 5 mg by mouth 2 (two) times a day before meals.       Eusebio Therapuetic Nutrition 7-7-1.5 gram PwPk Take 1 packet by mouth daily.       loperamide (IMODIUM) 2 mg capsule Take 1 capsule (2 mg total) by mouth 3 (three) times a day as needed for diarrhea.  0     metoprolol succinate (TOPROL-XL) 50 MG 24 hr tablet  Take 50 mg by mouth 2 (two) times a day.        multivitamin with minerals (THERA-M) 9 mg iron-400 mcg Tab tablet Take 1 tablet by mouth daily.       mupirocin (BACTROBAN) 2 % ointment Apply as instructed to ulcer daily 30 g 1     nitroglycerin (NITROSTAT) 0.4 MG SL tablet Place 0.4 mg under the tongue every 5 (five) minutes as needed for chest pain.        omeprazole (PRILOSEC) 20 MG capsule Take 20 mg by mouth daily.        simvastatin (ZOCOR) 40 MG tablet Take 40 mg by mouth at bedtime.        spironolactone (ALDACTONE) 25 MG tablet Take 1 tablet (25 mg total) by mouth daily.  0     urea-alpha hydroxy acids (ATRAC-TAIN, WITH AHA,) 10-4 % Crea Apply 1 application topically daily. Apply after washing legs & feet with water       WARFARIN SODIUM (WARFARIN ORAL) Take by mouth. 3/26/18 INR 2.9 Cont 3.5mg qd. Next INR 4/2.  3/21/18 INR 3.1 Cont 3.5mg qd. Next INR 3/26.  3/15/18 INR 1.6 3.5mg qd. Next INR 3/21.       Current Facility-Administered Medications   Medication Dose Route Frequency Provider Last Rate Last Dose     lidocaine 2 % jelly (XYLOCAINE)   Topical PRN Miriam Quinones, CNP   1 application at 02/19/18 0929       Allergies   Allergen Reactions     Latex      Penicillins        Physical Exam:    Vitals:    04/02/18 1016   BP: 108/78   Pulse: 80   Resp: 24   Temp: 97.5  F (36.4  C)    Body mass index is 27.71 kg/(m^2).    General:  72 y.o. male in no apparent distress.    Psychiatric:  Alert and oriented x 3.  Cooperative.   Integumentary:  Skin is uniformly warm except his left foot amputated site is warm and red.    Lower extremity edema: minimal    Left Foot Ulceration(s):     Wound bed:%100  granulation      Undermining no, Tunneling no   Wound Edge: attached  Periwound:  There is no erika wound erythema, maceration and warmth, but no induration, denudement fluctuance surrounding the ulcer(s).  Exudate:serous          Quantity: minimal  Odor:  absent   Wound Shape oval    Labs:    No results found  for: SEDRATE  Lab Results   Component Value Date    CRP 8.6 (H) 10/31/2017     No components found for: CREATINE  Lab Results   Component Value Date    BUN 30 (H) 03/12/2018     Lab Results   Component Value Date    HGBA1C 6.6 (H) 11/01/2017         Vasc Edema 1/25/2018 2/8/2018 2/19/2018 3/9/2018 4/2/2018   Right just above MTP 25.5 23 23 24.5 2736   Right Ankle 28 27 26 26.5 24.4   Right Widest Calf 40.5 36 37.5 36.5 34   Right Thigh Up 10cm - - - - -   Left - just above MTP 27.5 26 28 28 24.2   Left Ankle 25.7 22 25 24.5 255   Left Widest Calf 37.4 33 35 35 35.3   Left Thigh Up 10cm - - - - -       Wound 12/22/17 L amp medial (Active)   Pre Size Length 0.7 3/9/2018  9:00 AM   Pre Size Width 1.5 3/9/2018  9:00 AM   Pre Size Depth 0.1 3/9/2018  9:00 AM   Pre Total Sq cm 1.05 3/9/2018  9:00 AM   Post Size Length 0.8 3/9/2018  9:00 AM   Post Size Width 1.8 3/9/2018  9:00 AM   Post Size Depth 0 3/9/2018  9:00 AM       Wound 02/19/18 Left amp site (Active)   Pre Size Length 1.5 4/2/2018 10:00 AM   Pre Size Width 0.8 4/2/2018 10:00 AM   Pre Size Depth 0 4/2/2018 10:00 AM   Pre Total Sq cm 1.5 4/2/2018 10:00 AM       Wound 01/25/18 Non-pressure related ulcer Toe Right (Active)       Wound 01/25/18 Amputation Right;3rd digit (Active)       Wound 01/25/18 Non-pressure related ulcer Foot Left (Active)       Incision 01/25/18 Toe Right;2nd digit (Active)       Assessment:  1. Ulcer of foot, limited to breakdown of skin, unspecified laterality     2. Polyneuropathy associated with underlying disease     3. Venous hypertension, chronic, bilateral     4. Dyslipidemia, goal LDL below 70     5. Type 2 diabetes mellitus with foot ulcer, without long-term current use of insulin     6. Acquired lymphedema of lower extremity         A new wound was identified today: No      Plan:  1.  Debridement of the left leg ulcer was recommended.  After consent was obtained and topical 2% Xylocaine was applied under clean conditions, and using a  #15 blade,the devitalized dermal tissue was debrided for a total square centimeters of 1.2.  Following debridement, there was a decrease in the nonviable tissue. The patient tolerated the procedure without any difficulty.     2.  Left foot ulceration.  Ulcer size is stable.  Minimal improvement noted.  Will rule out bacteria overload as reason for the ulcer not closing.  There are no signs of infection.     3.  Venous insufficiency, stable.    4.  Dermatitis, resolved with Aquaphor    5.  Edema, less    6.  Diabetes, on Metformin.  6.6 A1c on 11/1/2017.      7.  Treatment:  Will discontinue Aquacel and start Tegaderm AG Mesh and cover with a Mepilex border.  The dressing can be changed twice per week.  Will continue with comprilan for compression.      8.  Patient will follow up with me in 2 weeks for further evaluation and response to the treatment.  If the ulcer fails to improve, I will consider Iodoflex to treat the biofilm.     Miriam Quinones, APRN, CNP,  [facility]  [phone]

## 2021-06-17 NOTE — PROGRESS NOTES
Mr. Crane is here 6 months out from his left transmetatarsal amputation.  He is doing fairly well, no pain.  But is looking for a more appropriate shoe to wear.  He noted some drainage from the dorsum of his foot.      On exam:   Left foot incision healing very nicely.  Small opening remains on the medial aspect, 1.8x0.6 cm.  Good granulation tissue.  The remainder of the foot is intact without problems.  He does have venous statis in both legs with discoloration and edema.     Plan:   He was given a order to see Andrew for a diabetic shoe and fitting.   He plans to continue with Miriam Wayne.  He can see us as needed.     Patient was seen with Dr. Ever Wick.

## 2021-06-17 NOTE — PATIENT INSTRUCTIONS - HE
Patient Instructions by Miriam Quinones CNP at 2019  1:00 PM     Author: Miriam Quinones CNP Service: -- Author Type: Nurse Practitioner    Filed: 2019  1:09 PM Encounter Date: 2019 Status: Addendum    : Miriam Quinones CNP (Nurse Practitioner)    Related Notes: Original Note by Miriam Quinones CNP (Nurse Practitioner) filed at 2019  1:07 PM           Important Instructions     * Stop using your current products and dressings     For the ulcer on your left medial leg, near the knee, a Tegaderm + Pad will be applied weekly.                 How to care for your wound your lateral leg ulcer    Cleanse wound with saline sent to you with your supplies or a wound wash found at the pharmacy.    *   Cut to fit the Endoform and cover with xeroform and place in or on the wound.      *   Cover the wound with ABD    *   Change dressinx/week    *   Apply 2-layer on the left leg for compression. Compression is needed to decrease your swelling and heal any sores.  Apply the tubigrip on the right leg.    *   Do not get your ulcer wet when you shower or take a bath.  You can cover it with cling wrap, a bag taped to your skin or a cast protector.    Apply one of the following moisturizers to your dry and/or cracking skin one to two times daily.  Majority of these are available at the pharmacy downstairs.  If you don't see the item on the shelf, ask the staff about it.        Eucerin original    Eucerin with 5% urea     Eucerin with 10% urea              Atrac-tain Cream with 10% urea      _          O'Keeffe's Healthy Feet       O'Keeffe's Working Hands      Udderly smooth -      This item is not available at the pharmacy downstairs    *   Good nutrition is important for wound healing.  I recommend increasing your protein.  You can do this through your diet, nutritional supplements, and/or protein powder.      Please call [facility] before substituting any  product    Call our clinic nurse at [phone] if there is an increase in drainage, pain, redness, or the wound size increases.  This maybe a sign of infection and require attention prior to the next appointment      KEVIN Ness, CNP, CWCN

## 2021-06-17 NOTE — PATIENT INSTRUCTIONS - HE
Patient Instructions by Miriam Quinones CNP at 2019  2:20 PM     Author: Miriam Quinones CNP Service: -- Author Type: Nurse Practitioner    Filed: 2019  2:45 PM Encounter Date: 2019 Status: Signed    : Miriam Quinones CNP (Nurse Practitioner)           Important Instructions                How to care for your wound your lateral leg ulcer    Cleanse wound with saline sent to you with your supplies or a wound wash found at the pharmacy.    *   Cut to fit the Endoform and cover with xeroform and place in or on the wound.      *   Cover the wound with ABD    *   Change dressinx/week    *   Apply 2-layer on the left leg for compression. Compression is needed to decrease your swelling and heal any sores.  Apply the tubigrip on the right leg.    *   Do not get your ulcer wet when you shower or take a bath.  You can cover it with cling wrap, a bag taped to your skin or a cast protector.    Apply one of the following moisturizers to your dry and/or cracking skin one to two times daily.  Majority of these are available at the pharmacy downstairs.  If you don't see the item on the shelf, ask the staff about it.  You can also use Aquphor.        Eucerin original    Eucerin with 5% urea     Eucerin with 10% urea              Atrac-tain Cream with 10% urea      _          O'Keeffe's Healthy Feet       O'Keeffe's Working Hands      Udderly smooth -      This item is not available at the pharmacy downstairs    *   Good nutrition is important for wound healing.  I recommend increasing your protein.  You can do this through your diet, nutritional supplements, and/or protein powder.      Please call [facility] before substituting any product    Call our clinic nurse at [phone] if there is an increase in drainage, pain, redness, or the wound size increases.  This maybe a sign of infection and require attention prior to the next appointment      KEVIN Ness CNP,  CWCN

## 2021-06-17 NOTE — PROGRESS NOTES
Vascular surgery: This 72-year-old male comes in following his left transmetatarsal amputation.  This was done in November, 2017.  The patient is generally done well.  Edema has been an issue but this is under better control than previously.  Currently, the wound is quite clean with a good granulating base over the medial aspect.  It measures about 8 mm x 20 mm.  No surrounding cellulitis or evidence of infection and good capillary filling is present.    All told, the patient seems to be coming along nicely.  Will continue with the same cares.  I will see him again in 4-6 weeks.

## 2021-06-18 NOTE — LETTER
Letter by Miriam Quinones CNP at      Author: Miriam Quinones CNP Service: -- Author Type: --    Filed:  Encounter Date: 2019 Status: (Other)       2019    [facility]  Fax: [phone] Wound Dressing Rx and Order Form  Customer Service: [phone] Referral #:  [id number] ([place])   Order Status: New Order   Verbal: Gustabo Castorena LPN         Patient Info:  Name: Ever Crane  : 1945  Address:   [address] [address]  Phone: [phone]      Insurance Info:  Primary: Payor: MEDICARE / Plan: MEDICARE A AND B / Product Type: Medicare /    Secondary: COMMERCIAL INSURANCE-GENERIC HE GENERIC COMMERCIAL  [id number] - (Medicare)    Physician Info:   Name:  Miriam Quinones CNP   Dept Address/Phones:   [address] [place] [address], [facility] [address] [address] [phone]  Fax: [phone]    Lymphedema circumferential measurements (in cm):  No Data Recorded  No Data Recorded  No Data Recorded  No Data Recorded  No Data Recorded  No Data Recorded  No Data Recorded  No Data Recorded  Right just above MTP: 23.2    Right Ankle: 23.5    Right Widest Calf: 39    Right Thigh Up 10cm: 46    Left - just above MTP: 25.5    Left Ankle: 23    Left Widest Calf: 35.4    Left Thigh Up 10cm: 46       Wound info:  Encounter Diagnoses   Name Primary?   ? Ulcer of left lower extremity with fat layer exposed (H) Yes   ? Leg ulcer, left, limited to breakdown of skin (H)    ? Acquired lymphedema of lower extremity    ? Allergic contact dermatitis due to adhesives    ? PAD (peripheral artery disease) (H)    ? Non-insulin dependent type 2 diabetes mellitus (H)      VASC Wound 10/22/18 left medial leg vein harvest (below knee) (Active)   Pre Size Length 3.1 2019  2:00 PM   Pre Size Width 1.3 2019  2:00 PM   Pre Size Depth 0.2 2019  2:00 PM   Pre Total Sq cm 4.03 2019  2:00 PM   Post Size Length 2.4  "2/5/2019  2:00 PM   Post Size Width 0.9 2/5/2019  2:00 PM   Post Size Depth 0.4 1/22/2019  3:00 PM   Post Total Sq cm 0.7 12/3/2018  1:00 PM   Undermined no 1/22/2019  3:00 PM   Tunneling no 1/22/2019  3:00 PM   Description 0.6@1700 1/18/2019 12:00 PM   Prodcut Used Bactroban 10/22/2018  2:00 PM       VASC Wound 11/12/18 left lateral lower leg (Active)   Pre Size Length 2.4 2/5/2019  2:00 PM   Pre Size Width 2.5 2/5/2019  2:00 PM   Pre Size Depth 0.1 2/5/2019  2:00 PM   Pre Total Sq cm 6 2/5/2019  2:00 PM   Post Size Length 2 2/5/2019  2:00 PM   Post Size Width 1.8 2/5/2019  2:00 PM   Post Size Depth 0.1 12/20/2018  9:00 AM   Undermined n 11/12/2018  2:00 PM   Tunneling n 11/12/2018  2:00 PM   Description weeping 12/3/2018  1:00 PM                              Drainage: Moderate  Thickness:  Full  Duration of Need: 30  Days Supply: 30  Start Date: 02/05/19  Starter Kit: Ancillary Kit (saline, gloves, gauze)  Qualifying wound/Debridement Yes      Dressing Type Brand Size Number of pieces Frequency of change    Primary Collagen Endoform 2\"x2\"  {Change Frequency (Optional):98896}        {Change Frequency (Optional):55933}        {Change Frequency (Optional):12534}        {Change Frequency (Optional):30002}        {Change Frequency (Optional):82359}        {Change Frequency (Optional):14972}   Secondary     {Change Frequency (Optional):70535}        {Change Frequency (Optional):13208}        {Change Frequency (Optional):22348}        {Change Frequency (Optional):69668}        {Change Frequency (Optional):18437}   Tape     {Change Frequency (Optional):46575}        {Change Frequency (Optional):11608}        {Change Frequency (Optional):44093}       Note: If total out of pocket is more than $50.00 please contact the patient before processing order.     OK to forward to covered supplier.    Electronically Signed Physician: Miriam Quinones, DANIELA Date: 2/5/2019         "

## 2021-06-18 NOTE — PROGRESS NOTES
[facility]    DATE: 2018    NAME:  Ever Crane             :  1945  MRN: [id number]  CODE STATUS:  FULL CODE    VISIT TYPE: Problem Visit (inr, warfarin management, wound infection)     FACILITY:  [facility] [[id number]]       CHIEF COMPLAIN/REASON FOR VISIT:    Chief Complaint   Patient presents with     Problem Visit     inr, warfarin management, wound infection               HISTORY OF PRESENT ILLNESS: Ever Crane is a 73 y.o. male who is a long term care resident of [facility]. He has PMH of PVD with transmetetarsal amputation of left foot on , Type 2 DM, venous stasis ulcers, Atrial flutter, CAD, CABG x2, HLD, HTN, neuropathy, PAF.     Today Mr. Crane is seen for paperwork completion. He lost his social security card and needs another but does not have the correct identification, so he needs paperwork completed by a medical provider to prove his identity. He also had INR today of 3.2 and needs warfarin orders. In addition, wound nurse is concerned his amputation site is infected. She has put a call into vascular clinic for orders. Mr. Crane is seen laying in bed and reports he is tired and ready for a nap. He denies issues with bowels or bladder at this time and no fevers or chills. He says he needs this paperwork completed to get a new social security card. He reports they found an apartment for him but he does not want to go there. He wants to find a different place. Per staff no fevers recently and vitals stable. He last saw vascular clinic on  and at that time he as noted to have a rash around his ulcer. Per staff he continues to be very noncompliant with care, orders. He is not allowing nursing to change dressings or notifying staff when they come off. He is frequently walking around barefoot in facility and has been educated many times against this. He has another appt with wound clinic tomorrow .     REVIEW OF  SYSTEMS:  PROBLEMS AND REVIEW OF SYSTEMS:   Review of Systems  Today on ROS:   Currently, no fever, chills, or rigors. Does not have any visual or hearing problems. Denies any chest pain, headaches, palpitations, lightheadedness, dizziness. Appetite is good. Denies any GERD symptoms. Denies any difficulty with swallowing, nausea, or vomiting.  Denies any abdominal pain, diarrhea or constipation. Denies any urinary symptoms. No insomnia. No active bleeding. Wounds on feet/legs, denies sob today, positive for leg edema, concerns for wound infection foot      Allergies   Allergen Reactions     Latex      Penicillins      Current Outpatient Prescriptions   Medication Sig     acetaminophen (TYLENOL) 500 MG tablet Take 2 tablets (1,000 mg total) by mouth every 6 (six) hours as needed.     albuterol (PROVENTIL) 2.5 mg /3 mL (0.083 %) nebulizer solution Take 3 mL (2.5 mg total) by nebulization every 6 (six) hours as needed for wheezing or shortness of breath.     dextromethorphan-guaiFENesin (ROBITUSSIN-DM)  mg/5 mL liquid Take 10 mL by mouth every 4 (four) hours as needed (cough).     furosemide (LASIX) 40 MG tablet Take 0.5 tablets (20 mg total) by mouth daily as needed (Extra dose at noon eache day if weight gain> 2lbs in 24 hours).     furosemide (LASIX) 40 MG tablet Take 1 tablet (40 mg total) by mouth daily.     gentamicin (GARAMYCIN) 0.1 % ointment Apply as instructed to ulcer daily     glipiZIDE (GLUCOTROL) 5 MG tablet Take 5 mg by mouth 2 (two) times a day before meals.     Eusebio Therapuetic Nutrition 7-7-1.5 gram PwPk Take 1 packet by mouth daily.     lisinopril (PRINIVIL,ZESTRIL) 10 MG tablet Take 10 mg by mouth daily.     loperamide (IMODIUM) 2 mg capsule Take 1 capsule (2 mg total) by mouth 3 (three) times a day as needed for diarrhea.     metoprolol succinate (TOPROL-XL) 50 MG 24 hr tablet Take 50 mg by mouth 2 (two) times a day.      multivitamin with minerals (THERA-M) 9 mg iron-400 mcg Tab tablet Take  1 tablet by mouth daily.     mupirocin (BACTROBAN) 2 % ointment Apply as instructed to ulcer daily     nitroglycerin (NITROSTAT) 0.4 MG SL tablet Place 0.4 mg under the tongue every 5 (five) minutes as needed for chest pain.      omeprazole (PRILOSEC) 20 MG capsule Take 20 mg by mouth daily.      simvastatin (ZOCOR) 40 MG tablet Take 40 mg by mouth at bedtime.      spironolactone (ALDACTONE) 25 MG tablet Take 1 tablet (25 mg total) by mouth daily.     urea-alpha hydroxy acids (ATRAC-TAIN, WITH AHA,) 10-4 % Crea Apply 1 application topically daily. Apply after washing legs & feet with water     WARFARIN SODIUM (WARFARIN ORAL) Take by mouth. 3/26/18 INR 2.9 Cont 3.5mg qd. Next INR 4/2.  3/21/18 INR 3.1 Cont 3.5mg qd. Next INR 3/26.  3/15/18 INR 1.6 3.5mg qd. Next INR 3/21.     Past Medical History:    Past Medical History:   Diagnosis Date     Atrial flutter      Coronary Artery Disease     CABG x2     Diabetic ulcer of both feet 10/31/2017     Dyslipidemia, goal LDL below 70      Foot ulcer      Gangrene of left foot      Hypertension      Neuropathy      Paroxysmal Atrial Fibrillation     MoeBrian: 8/2011 Cardioversion; CHADS2 = 2 he is on warfarin and sotalol      Type 2 Diabetes Mellitus            PHYSICAL EXAMINATION  Vitals:    05/30/18 0700   BP: 117/67   Pulse: 68   Resp: 18   Temp: 97.7  F (36.5  C)   SpO2: 97%     Today on physical exam:      GENERAL: Awake, Alert, oriented x3, not in any form of acute distress, answers questions appropriately, follows simple commands, conversant  HEENT: Head is normocephalic with normal hair distribution. No evidence of trauma. Ears: No acute purulent discharge. Eyes: Conjunctivae pink with no scleral jaundice. Nose: Normal mucosa and septum. NECK: Supple with no cervical or supraclavicular lymphadenopathy. Trachea is midline.   CHEST: No tenderness or deformity, no crepitus  LUNG: dim to auscultation with good chest expansion. There are no crackles or wheezes,  normal AP diameter.  BACK: No kyphosis of the thoracic spine. Symmetric, no curvature, ROM normal, no CVA tenderness, no spinal tenderness   CVS: irregularly irregular, rhythm, there are no murmurs, rubs, gallops, or heaves,  2+ pulses symmetric in all extremities.  ABDOMEN: Rounded and soft, nontender to palpation, non distended, no masses, no organomegaly, good bowel sounds, no rebound or guarding, no peritoneal signs.   EXTREMITIES: 1-2+ ble pedal edema, denisse discoloration ble, transmetatarsal amputation left foot site and wounds covered with dressing, scattered venous stasis ulcers  SKIN: Warm and dry, no erythema noted.  Skin color, texture,perineal rash maculopapular   NEUROLOGICAL: The patient is oriented to person, place and time. Strength and sensation are grossly intact. Face is symmetric.Irritable at times, noncompliant at times. Irrational judgement at times. BLE neuropathy            LABS:   No results found for this or any previous visit (from the past 168 hour(s)).  Results for orders placed or performed in visit on 04/20/18   Basic Metabolic Panel   Result Value Ref Range    Sodium 137 136 - 145 mmol/L    Potassium 4.6 3.5 - 5.0 mmol/L    Chloride 105 98 - 107 mmol/L    CO2 23 22 - 31 mmol/L    Anion Gap, Calculation 9 5 - 18 mmol/L    Glucose 217 (H) 70 - 125 mg/dL    Calcium 9.7 8.5 - 10.5 mg/dL    BUN 40 (H) 8 - 28 mg/dL    Creatinine 1.37 (H) 0.70 - 1.30 mg/dL    GFR MDRD Af Amer >60 >60 mL/min/1.73m2    GFR MDRD Non Af Amer 51 (L) >60 mL/min/1.73m2         Lab Results   Component Value Date    WBC 7.3 04/20/2018    HGB 12.8 (L) 04/20/2018    HCT 40.1 04/20/2018    MCV 87 04/20/2018     04/20/2018       Lab Results   Component Value Date    QCIDBSYO33 300 04/11/2018     Lab Results   Component Value Date    HGBA1C 7.3 (H) 04/20/2018     Lab Results   Component Value Date    INR 1.72 (H) 01/28/2018    INR 1.98 (H) 01/27/2018    INR 2.13 (H) 01/26/2018     No results found for:  [id number]  Lab Results   Component Value Date    TSH 4.46 04/11/2018           ASSESSMENT/PLAN:    1. Candidiasis of perineal region: Recurrent, nystatin cream and powder ordered.    2. Left transmetatarsal amputation, severe PVD, venous stasis ulcers: Continues to follow with vascular clinic, last saw 4/16-Left foot ulcer larger, culture taken, awaiting results for antibiotic recommendation. F/u again 4/30. Continue wound care orders per clinic. Saw Dr. Wick 4/6-doing well f/u again 4-6 weeks. Concern for wound infection, wound nurse updated wound clinic today. Has appt 5/31.   3. Type 2 DM: -315. Continue glipizide. Noncompliant with diet. On glipizide  10mg in am, continue 5mg in pm.   4. CAD, CABG: On metoprolol, nitrostat, simvastatin. F/u with cardiology 4/25-low sodium diet, daily weights, continue current meds. F/u with Dr. Moe on 6/13.   5. Atrial flutter: On metoprolol, rate controlled. On warfarin. INR 3.2 today, continue current dose and recheck in 2 weeks.   6. HTN: SBP 120s. Stable on lasix, metoprolol, lisinopril.    7. IBS: Imodium prn.   8. Acute on chronic CHF: Daily weights [phone] lbs. On lasix, continue LUKE hose/tubigrips. On lasix. F/u with cardiology 6/13. Edema is improved 1-2+ ble, noncompliant with diet at times.   9. MESHA on CKD: Cr 1.38-1.63 now 1.12 on 3/12. Monitor.   10. Muscular pain:No further complaints.  11. Hemorrhoids: Using cream from pharmacy over the counter as refuses anusol.   12. Hoarding behaviors: Continues to cheri boxes, old tissues, napkins, used plastic silverware, jelly containers, etc in room. Staff has been monitoring for molding and patient has been educated on many occasions.   13. Neuropathy: BLE. F/u with neurology.     Per  apartment has been found for him but he refuses to move as he does not like the apartment. Social security card paperwork completed today.     Electronically signed by: Preeti Conde NP    Total 35  minutes of which 75% was spent in counseling and coordination of care of the above plan    Time spent in interview and examination of patient, review of available records, and discussion with nursing staff. Continue care plan, efforts at therapy, and monitor nutritional status.

## 2021-06-18 NOTE — LETTER
Letter by Brian Moe MD at      Author: Brian Moe MD Service: -- Author Type: --    Filed:  Encounter Date: 2/12/2019 Status: (Other)       Ever Mcginnis [address]  [place] [address]      February 12, 2019      Dear Ever,    This letter is to remind you that you will be due for your follow up appointment with Dr. Brian Moe . To help ensure you are in the best health possible, a regular follow-up with your cardiologist is essential.     Please call our Patient Scheduling Line at [phone] to schedule your appointment at your earliest convenience.  If you have recently scheduled an appointment, please disregard this letter.    We look forward to seeing you again. As always, we are available at the number  above for any questions or concerns you may have.      Sincerely,     The Physicians and Staff of [facility]

## 2021-06-18 NOTE — PROGRESS NOTES
[facility]    Facility:   [facility] [[id number]]   Code Status: FULL CODE      CHIEF COMPLAINT/REASON FOR VISIT:  Chief Complaint   Patient presents with     Problem Visit     rash, DM       HISTORY:      HPI: Ever is a 73 y.o. male who has a history of congestive heart failure, type 2 diabetes mellitus, peripheral artery disease, atrial fibrillation on chronic Coumadin anticoagulation, CAD, hypertension, diabetic foot ulcers and subsequent partial amputation, hypertension and hyperlipidemia who was having 4 days of shortness of breath with CASTANO, orthopnea, increased edema and decreased exercise tolerance.  His BNP was elevated.  His creatinine was 1.5 from previously normal baseline.  CTA chest was negative for PE but did show bilateral pleural effusions.  Diuretic increase of his Lasix as well as 1 dose of metolazone improved his symptoms.  Subsequent to that hospitalization, he has had no further problems in regards to shortness of breath.    I am asked to see him today on a semiurgent basis for 2 reasons.  His scrotal rash has persisted and he is almost done with the weekly Diflucan.  He has questions about salves to be applied to the skin.  The chief reason for the visit today is in regards to custom orthotic diabetic shoes and inserts.  It is been brought to my attention that the form requires a physician to address the issues and write the prescription.  He states that he has had diabetes for about the last 7 years.  He currently is taking glipizide 10 mg twice daily.  He was not aware of any circulatory problem prior to diabetes.  He states however that he did freeze his feet frequently as a child while playing hockey.  He has undergone transmetatarsal amputation of left foot and since that partial foot amputation there was some ulceration and scab on the dorsal aspect of the distal part of the stump which now has resolved.    Past Medical History:   Diagnosis Date  "    Atrial flutter      Coronary Artery Disease     CABG x2     Diabetic ulcer of both feet 10/31/2017     Dyslipidemia, goal LDL below 70      Foot ulcer      Gangrene of left foot      Hypertension      Neuropathy      Paroxysmal Atrial Fibrillation     Brian Moe: 2011 Cardioversion; CHADS2 = 2 he is on warfarin and sotalol      Type 2 Diabetes Mellitus              Family History   Problem Relation Age of Onset     Sudden death Mother 85     CABG Father      Valvular heart disease Father      Esophageal cancer Father 58      of this     No Medical Problems Son      Social History     Social History     Marital status:      Spouse name: N/A     Number of children: 1     Years of education: N/A     Occupational History      Self Employed     active     Social History Main Topics     Smoking status: Former Smoker     Types: Cigarettes     Quit date: 2000     Smokeless tobacco: Never Used     Alcohol use 0.0 oz/week     2 - 3 Glasses of wine, 2 - 3 Cans of beer per week     Drug use: No     Sexual activity: Yes     Other Topics Concern     Not on file     Social History Narrative    Two granddaughters Haven Gipson    Patient Lives with son Raciel  2017             Review of Systems   Constitutional: Negative for chills and fever.   Respiratory: Negative for cough and shortness of breath.    Cardiovascular: Negative for chest pain.       .  Vitals:    18 1357   BP: 110/56   Pulse: 67   Resp: 20   Temp: 97.4  F (36.3  C)   Weight: 197 lb 12.8 oz (89.7 kg)   Height: 5' 11\" (1.803 m)       Physical Exam   Constitutional: No distress.   Cardiovascular: Normal rate, regular rhythm and normal heart sounds.    Pulmonary/Chest: Breath sounds normal. No respiratory distress.   Musculoskeletal: He exhibits no edema.   Status post transmetatarsal amputation of the left foot.  Posterior tibial pulse is absent.  Right pedal pulses are absent.   Neurological: He is alert.   Skin:   Red " scrotal skin and extending up into the groin folds.  There are some satellite lesions.  There is no bleeding nor is there ulceration of the skin.  The skin of his right foot is pink and his left foot stump is pink as well.  Warm to the touch.   Psychiatric: He has a normal mood and affect.   Nursing note and vitals reviewed.        LABS:   Glucose 333    ASSESSMENT:      ICD-10-CM    1. Type 2 diabetes mellitus with foot ulcer, without long-term current use of insulin E11.621     L97.509    2. Candidiasis of perineum B37.49    3. S/P transmetatarsal amputation of foot, left Z89.432        PLAN:    It is my recommendation that he have custom orthotic diabetic shoes and inserts since he is at great risk for future occasions of skin breakdown due to his diabetes and poor peripheral arterial flow.  In regards to the recurrent and persistent nature of the perineal rash, I explained that his diabetes predisposes him to yeast infection.  I did advise aggressive dosing of Diflucan 200 mg daily for the next 14 days and to continue with nystatin ointment twice daily.      Electronically signed by: Carl Young MD

## 2021-06-18 NOTE — PROGRESS NOTES
Date of Service:5/31/2018    Chief Complaint:   Chief Complaint   Patient presents with     Wound Check       History:    Ever presents to clinic for evaluation of his foot ulcers.  He has a history that is significant for CAD, diabetes, and venous insufficiency. Status post left transmetatarsal amputation was performed on 11/5/17 by Dr. DESTINY Wick.   On 11/9/2017 he was discharged to a TCU where he currently resides. He is able to do full weight bearing on his heel, per Dr. DESTINY Mchugh on January 5.     The patient was last seen by me on 5/14/2018The care center is applying iodoflex and mepilex border to the ulcer twice per week. He has developed a bilateral leg rash with the left leg weeping. According to the patient, he received tubigrip from the care center.  If the tubigrip has latex in them, he maybe allergic to them  Instructed him to only wear stockings that our clinic provides for him.  He denies any pain in his wound and there is minimal drainage.     Current Outpatient Prescriptions   Medication Sig Dispense Refill     fluconazole (DIFLUCAN) 100 MG tablet Take 100 mg by mouth daily.       acetaminophen (TYLENOL) 500 MG tablet Take 2 tablets (1,000 mg total) by mouth every 6 (six) hours as needed.  0     albuterol (PROVENTIL) 2.5 mg /3 mL (0.083 %) nebulizer solution Take 3 mL (2.5 mg total) by nebulization every 6 (six) hours as needed for wheezing or shortness of breath.  0     dextromethorphan-guaiFENesin (ROBITUSSIN-DM)  mg/5 mL liquid Take 10 mL by mouth every 4 (four) hours as needed (cough).       furosemide (LASIX) 40 MG tablet Take 0.5 tablets (20 mg total) by mouth daily as needed (Extra dose at noon eache day if weight gain> 2lbs in 24 hours).  0     furosemide (LASIX) 40 MG tablet Take 1 tablet (40 mg total) by mouth daily.  0     gentamicin (GARAMYCIN) 0.1 % ointment Apply as instructed to ulcer daily 30 g 1     glipiZIDE (GLUCOTROL) 5 MG tablet Take 5 mg by mouth 2 (two) times a day before  meals.       Eusebio Therapuetic Nutrition 7-7-1.5 gram PwPk Take 1 packet by mouth daily.       lisinopril (PRINIVIL,ZESTRIL) 10 MG tablet Take 10 mg by mouth daily.       loperamide (IMODIUM) 2 mg capsule Take 1 capsule (2 mg total) by mouth 3 (three) times a day as needed for diarrhea.  0     metoprolol succinate (TOPROL-XL) 50 MG 24 hr tablet Take 50 mg by mouth 2 (two) times a day.        multivitamin with minerals (THERA-M) 9 mg iron-400 mcg Tab tablet Take 1 tablet by mouth daily.       mupirocin (BACTROBAN) 2 % ointment Apply as instructed to ulcer daily 30 g 1     nitroglycerin (NITROSTAT) 0.4 MG SL tablet Place 0.4 mg under the tongue every 5 (five) minutes as needed for chest pain.        omeprazole (PRILOSEC) 20 MG capsule Take 20 mg by mouth daily.        simvastatin (ZOCOR) 40 MG tablet Take 40 mg by mouth at bedtime.        spironolactone (ALDACTONE) 25 MG tablet Take 1 tablet (25 mg total) by mouth daily.  0     triamcinolone (KENALOG) 0.1 % cream Apply to rash on the legs daily until rash is gone, but not longer than 2 weeks. 30 g 1     urea-alpha hydroxy acids (ATRAC-TAIN, WITH AHA,) 10-4 % Crea Apply 1 application topically daily. Apply after washing legs & feet with water       WARFARIN SODIUM (WARFARIN ORAL) Take by mouth. 3/26/18 INR 2.9 Cont 3.5mg qd. Next INR 4/2.  3/21/18 INR 3.1 Cont 3.5mg qd. Next INR 3/26.  3/15/18 INR 1.6 3.5mg qd. Next INR 3/21.       Current Facility-Administered Medications   Medication Dose Route Frequency Provider Last Rate Last Dose     lidocaine 2 % jelly (XYLOCAINE)   Topical PRN Miriam Quinones CNP   1 application at 02/19/18 0929       Allergies   Allergen Reactions     Latex      Penicillins        Physical Exam:    Vitals:    05/31/18 1126   BP: 120/80   Pulse: 80   Resp: (!) 80   Temp: 97.7  F (36.5  C)    There is no height or weight on file to calculate BMI.    General:  73 y.o. male in no apparent distress.    Psychiatric:  Alert and oriented x 3.   Cooperative.   Integumentary:  Skin is uniformly warm and dry.  Right and left lower legs with a rash and weeping noted on the left leg.  Lower extremity edema: minimal    Left Foot Ulceration(s): resolved    Wound Shape oval    Labs:    No results found for: SEDRATE  Lab Results   Component Value Date    CRP 8.6 (H) 10/31/2017     No components found for: CREATINE  Lab Results   Component Value Date    BUN 40 (H) 04/20/2018     Lab Results   Component Value Date    HGBA1C 7.3 (H) 04/20/2018         Vasc Edema 2/8/2018 2/19/2018 3/9/2018 4/2/2018 5/14/2018   Right just above MTP 23 23 24.5 27.4 23.6   Right Ankle 27 26 26.5 24.4 27.6   Right Widest Calf 36 37.5 36.5 34 37.2   Right Thigh Up 10cm - - - - 46.2   Left - just above MTP 26 28 28 24.2 28.2   Left Ankle 22 25 24.5 25.5 25.5   Left Widest Calf 33 35 35 35.3 35.5   Left Thigh Up 10cm - - - - 44.9       Wound 01/25/18 Non-pressure related ulcer Toe Right (Active)       Wound 01/25/18 Amputation Right;3rd digit (Active)       Wound 01/25/18 Non-pressure related ulcer Foot Left (Active)       Incision 01/25/18 Toe Right;2nd digit (Active)       Assessment:  1. Ulcer of foot, limited to breakdown of skin, unspecified laterality     2. S/P transmetatarsal amputation of foot, left  Supply - Other   3. Polyneuropathy associated with underlying disease     4. PAD (peripheral artery disease)     5. Venous hypertension, chronic, bilateral     6. Venous stasis dermatitis of left lower extremity     7. Contact dermatitis  triamcinolone (KENALOG) 0.1 % cream   8. Diabetes mellitus, type 2  Supply - Other       A new wound was identified today: No      Plan:  1.  Debridement of the left leg ulcer was recommended.  After consent was obtained and topical 2% Xylocaine was applied under clean conditions, and using a #15 blade,the devitalized dermal tissue was debrided for a total square centimeters of 0.10.  Following debridement, there was a decrease in the nonviable tissue.  The patient tolerated the procedure without any difficulty.     2.  Left foot ulceration.  resolved    3.  Venous insufficiency,  Stable    4.  Dermatitis,  Suspect is from the compression socks that the care center provided.  Will discontinue them and provide latex free Dermafit (Tubigrip) that our clinic provides.  Also wrote a prescription for TMC to help resolve the rash.      5.  Edema, stable.    6.  Diabetes, on Metformin.  6.6 A1c on 11/1/2017.  Wrote a prescription for diabetic inserts.    Patient has Diabetes Mellitus and I have noted in the foot exam that they have a  history of amputation of the foot or part of the foot, History of Ulcer, history of Pre-Ulcerative Callous and foot deformity.    7.  Treatment:  Will discontinue Iodoflex and cover area with gauze for the next two weeks.  This can be changed weekly.  Encouraged him to make his appointment with Andrew.    8.  Patient will follow up with me in 2 weeks for further evaluation and response to the treatment.  If the ulcer fails to improve, I will consider daily Bactroban and gentamycin.      Miriam Quinones, APRN, CNP,  [facility]  [phone]

## 2021-06-18 NOTE — PROGRESS NOTES
[facility]    DATE: 2018    NAME:  Ever Crane             :  1945  MRN: [id number]  CODE STATUS:  FULL CODE    VISIT TYPE: Discharge Summary     FACILITY:  [facility] [[id number]]       CHIEF COMPLAIN/REASON FOR VISIT:    Chief Complaint   Patient presents with     Discharge Summary               HISTORY OF PRESENT ILLNESS: Ever Crane is a 73 y.o. male who is a long term care resident of [facility]. He has PMH of PVD with transmetetarsal amputation of left foot on , Type 2 DM, venous stasis ulcers, Atrial flutter, CAD, CABG x2, HLD, HTN, neuropathy, PAF.     Today Mr. Crane is seen as he will be discharging on  to assisted living. He has been long term care resident since completing snf stay following his transmetatarsal amputation of left foot on . He has been treated for multiple wounds and wound infections and is following with the wound clinic. He has also been treated for persistent candidiasis of perineal area due to noncompliance with medicated ointments and powders. He recently reinstated his 's license and has been driving himself to a local pharmacy and using over the counter creams instead of what has been prescribed. He was just today prescribed a different ointment miconazole/zinc/white petroleum cream to apply twice daily to perineal region. His blood sugars ranged from 130-300s and Hga1c was 7.3 on . He remains on glipizide and continues to be noncompliant with his diet. He saw cardiology last on  and recommending cardiac diet, low sodium intake, daily weights and continue on metoprolol, nitrostat, simvastatin. His weights have been trending up over past few months [phone] but his swelling remains stable and not short of breath. This is suspected due to noncompliance with diet and now that he has his license he has been frequently leaving the facility and eating fast food as well as  working some as a . His renal function has been stable 1.3-1.6. He has hemorrhoids that flare from time to time but he was refusing to use anusol and using some over the counter cream instead. He continued to have hoarding behaviors during his stay, stockpiling old boxes, tissues, napkins, used plates, silverware, jelly containers in room and staff continued to re-educate on health risks. His neuropathy was stable. He was found an apartment but refused to move as he did not approve of the place, but now has found a different apartment that he is willing to move to. It is assisted living and he will have nursing to assist with his wound care. He was assigned to in house provider but per his report he may end up switching back to Dr. mak as the in house provider only rounds once per month and he would like to be seen weekly. He will be discharging on 6/26.     REVIEW OF SYSTEMS:  PROBLEMS AND REVIEW OF SYSTEMS:   Review of Systems  Today on ROS:   Currently, no fever, chills, or rigors. Does not have any visual or hearing problems. Denies any chest pain, headaches, palpitations, lightheadedness, dizziness. Appetite is good. Denies any GERD symptoms. Denies any difficulty with swallowing, nausea, or vomiting.  Denies any abdominal pain, diarrhea or constipation. Denies any urinary symptoms. No insomnia. No active bleeding. Wounds on feet/legs, denies sob today, positive for leg edema, perineal rash       Allergies   Allergen Reactions     Latex      Penicillins      Current Outpatient Prescriptions   Medication Sig     acetaminophen (TYLENOL) 500 MG tablet Take 2 tablets (1,000 mg total) by mouth every 6 (six) hours as needed.     albuterol (PROVENTIL) 2.5 mg /3 mL (0.083 %) nebulizer solution Take 3 mL (2.5 mg total) by nebulization every 6 (six) hours as needed for wheezing or shortness of breath.     furosemide (LASIX) 40 MG tablet Take 1 tablet (40 mg total) by mouth daily.     glipiZIDE (GLUCOTROL) 5 MG  tablet Take 10 mg by mouth 2 (two) times a day before meals. 10mg in am and 15mg in evening     lisinopril (PRINIVIL,ZESTRIL) 10 MG tablet Take 10 mg by mouth daily.     loperamide (IMODIUM) 2 mg capsule Take 1 capsule (2 mg total) by mouth 3 (three) times a day as needed for diarrhea.     metoprolol succinate (TOPROL-XL) 50 MG 24 hr tablet Take 50 mg by mouth 2 (two) times a day.      nitroglycerin (NITROSTAT) 0.4 MG SL tablet Place 0.4 mg under the tongue every 5 (five) minutes as needed for chest pain.      omeprazole (PRILOSEC) 20 MG capsule Take 20 mg by mouth daily.      simvastatin (ZOCOR) 40 MG tablet Take 40 mg by mouth at bedtime.      spironolactone (ALDACTONE) 25 MG tablet Take 1 tablet (25 mg total) by mouth daily.     furosemide (LASIX) 40 MG tablet Take 0.5 tablets (20 mg total) by mouth daily as needed (Extra dose at noon eache day if weight gain> 2lbs in 24 hours).     gentamicin (GARAMYCIN) 0.1 % ointment Apply as instructed to ulcer daily     urea-alpha hydroxy acids (ATRAC-TAIN, WITH AHA,) 10-4 % Crea Apply 1 application topically daily. Apply after washing legs & feet with water     WARFARIN SODIUM (WARFARIN ORAL) Take 1.5 mg by mouth daily. 6/14/18 INR 6.6. Vitk 5mg x1 now. Next INR 06/15/18  6/13/18 INR 5.8 (? Pool nurse did in house) HOLD today, Next INR 6/14. IS on Diflucan daily X 14days.  5/30/18 INR 3.2 3mg daily.  5/23/18 INR 3.3  3/26/18 INR 2.9 Cont 3.5mg qd. Next INR 4/2.  3/21/18 INR 3.1 Cont 3.5mg qd. Next INR 3/26.     Past Medical History:    Past Medical History:   Diagnosis Date     Atrial flutter (H)      Candidiasis of perineum 1/3/2018     Coronary artery disease due to lipid rich plaque 2000    CABG x2     Diabetic ulcer of both feet (H) 10/31/2017     Dyslexia      Dyslipidemia, goal LDL below 70 2000     Essential hypertension      Gangrene of left foot (H)      Neuropathy (H)      Paroxysmal Atrial Fibrillation     Brian Moe: 8/2011 Cardioversion; CHADS2 VASC = 5; he  is on warfarin and sotalol      Type 2 diabetes mellitus, without long-term current use of insulin (H)      Unable to function independently 11/13/2017           PHYSICAL EXAMINATION  Vitals:    06/19/18 2022   BP: 122/60   Pulse: 68   Resp: 20   Temp: (!) 96.4  F (35.8  C)   SpO2: 96%     Today on physical exam:      GENERAL: Awake, Alert, oriented x3, not in any form of acute distress, answers questions appropriately, follows simple commands, conversant  HEENT: Head is normocephalic with normal hair distribution. No evidence of trauma. Ears: No acute purulent discharge. Eyes: Conjunctivae pink with no scleral jaundice. Nose: Normal mucosa and septum. NECK: Supple with no cervical or supraclavicular lymphadenopathy. Trachea is midline.   CHEST: No tenderness or deformity, no crepitus  LUNG: dim to auscultation with good chest expansion. There are no crackles or wheezes, normal AP diameter.  BACK: No kyphosis of the thoracic spine. Symmetric, no curvature, ROM normal, no CVA tenderness, no spinal tenderness   CVS: irregularly irregular, rhythm, there are no murmurs, rubs, gallops, or heaves,  2+ pulses symmetric in all extremities.  ABDOMEN: Rounded and soft, nontender to palpation, non distended, no masses, no organomegaly, good bowel sounds, no rebound or guarding, no peritoneal signs.   EXTREMITIES: 1-2+ ble pedal edema, denisse discoloration ble, transmetatarsal amputation left foot site and wounds covered with dressing, scattered venous stasis ulcers  SKIN: Warm and dry, perineal rash maculopapular   NEUROLOGICAL: The patient is oriented to person, place and time. Strength and sensation are grossly intact. Face is symmetric.Irritable at times, noncompliant at times. Irrational judgement at times. BLE neuropathy            LABS:   No results found for this or any previous visit (from the past 168 hour(s)).  Results for orders placed or performed in visit on 04/20/18   Basic Metabolic Panel   Result Value Ref Range     Sodium 137 136 - 145 mmol/L    Potassium 4.6 3.5 - 5.0 mmol/L    Chloride 105 98 - 107 mmol/L    CO2 23 22 - 31 mmol/L    Anion Gap, Calculation 9 5 - 18 mmol/L    Glucose 217 (H) 70 - 125 mg/dL    Calcium 9.7 8.5 - 10.5 mg/dL    BUN 40 (H) 8 - 28 mg/dL    Creatinine 1.37 (H) 0.70 - 1.30 mg/dL    GFR MDRD Af Amer >60 >60 mL/min/1.73m2    GFR MDRD Non Af Amer 51 (L) >60 mL/min/1.73m2         Lab Results   Component Value Date    WBC 7.3 04/20/2018    HGB 12.8 (L) 04/20/2018    HCT 40.1 04/20/2018    MCV 87 04/20/2018     04/20/2018       Lab Results   Component Value Date    QTOUACNY88 300 04/11/2018     Lab Results   Component Value Date    HGBA1C 7.3 (H) 04/20/2018     Lab Results   Component Value Date    INR 1.72 (H) 01/28/2018    INR 1.98 (H) 01/27/2018    INR 2.13 (H) 01/26/2018     No results found for: [id number]  Lab Results   Component Value Date    TSH 4.46 04/11/2018           ASSESSMENT/PLAN:    1. Candidiasis of perineal region: Recurrent, nystatin cream and powder-switched to miconazole, zinc, white petroleum cream twice daily.   2. Left transmetatarsal amputation, severe PVD, venous stasis ulcers: Continues to follow with vascular clinic.   3. Type 2 DM: -315. Continue glipizide. Noncompliant with diet. On glipizide  10mg in am, continue 5mg in pm. Hga1c was 7.3 in April.   4. CAD, CABG: On metoprolol, nitrostat, simvastatin. F/u with cardiology 4/25-low sodium diet, daily weights, continue current meds. F/u with Dr. Moe on 6/13.   5. Atrial flutter: On metoprolol, rate controlled. On warfarin.   6. HTN: SBP 120s. Stable on lasix, metoprolol, lisinopril.    7. IBS: Imodium prn.   8. Acute on chronic CHF: Daily weights [phone] lbs. On lasix, continue LUKE hose/tubigrips. On lasix. F/u with cardiology 6/13. Edema is improved 1-2+ ble, noncompliant with diet at times.   9. MESHA on CKD: Cr 1.38-1.63. .   10. Muscular pain:No further complaints.  11. Hemorrhoids:resolved.  "  12. Hoarding behaviors: Continues to cheri boxes, old tissues, napkins, used plastic silverware, jelly containers, etc in room. Staff has been monitoring for molding and patient has been educated on many occasions.   13. Neuropathy: BLE. F/u with neurology.         Electronically signed by: Preeti Conde NP    Total 35 minutes of which 75% was spent in counseling and coordination of care of the above plan    Time spent in interview and examination of patient, review of available records, and discussion with nursing staff. Continue care plan, efforts at therapy, and monitor nutritional status.    Please evaluate Ever Crane for admission to Home Health.    Face to Face Attestation and Initial Plan of Care    The face-to-face encounter occurred on date: 6/20/18  Face to Face encounter was with: Preeti Conde    Please provide brief clinical summary of reason for visit and need for home care. Deconditioning after lengthy long term care stay, transmetatarsal amputation    Please identify which of the following home health disciplines the patient will need AND describe the skilled services that you would like the home health agency to perform: SKILLED NURSING (RN): wound care    Homebound Status (describe the functional limitations that support this patient is confined to his/her home. Medicaid recipients are not required to be homebound.):patient had surgery and it is difficult to ambulate more than  200 feet    Name of physician who will be responsible for the ongoing home health plan of care (CMS requires the referring physician to provide the specific name of the community physician instead of a title, such as \"PCP\"): Assisted living in house provider or Dr. Albarran    Requested Start of Care Date: Within 48 hours    Other information to assist the home health agency in developing the initial Plan of Care:    I certify that services are/were furnished while this patient was under the care of a physician and that " a physician or an allowed non-physician practitioner (NPP), had a face-to-face encounter that meets the physician face-to-face encounter requirements. The encounter was in whole, or in part, related to the primary reason for home health. The patient is confined to his/her home and needs intermittent skilled nursing, physical therapy, speech-language pathology, or the continued need for occupational therapy. A plan of care has been established by a physician and is periodically reviewed by a physician.

## 2021-06-18 NOTE — PROGRESS NOTES
Date of Service:5/14/2018    Chief Complaint:   Chief Complaint   Patient presents with     Wound Check       History:    Ever presents to clinic for evaluation of his foot ulcers.  He has a history that is significant for CAD, diabetes, and venous insufficiency. Status post left transmetatarsal amputation was performed on 11/5/17 by Dr. DESTINY Wick.   On 11/9/2017 he was discharged to a TCU where he currently resides. He is able to do full weight bearing on his heel, per Dr. DESTINY Mchugh on January 5.     The care center is applying iodoflex and mepilex border to the ulcer twice per week.  The care center is applying iodoflex to his ulcer and a foam adhesive.  His dressing fell off last a couple of days ago and he decided not to ask the nurse to reapply it.   He has developed a rash with some denudement around the ulcer.  He is not having any pain in the ulcer.  He saw Dr. Wick for a follow up and his orthotic order was faxed to [facility].  However, he did not follow through to make his appointment.      Current Outpatient Prescriptions   Medication Sig Dispense Refill     acetaminophen (TYLENOL) 500 MG tablet Take 2 tablets (1,000 mg total) by mouth every 6 (six) hours as needed.  0     albuterol (PROVENTIL) 2.5 mg /3 mL (0.083 %) nebulizer solution Take 3 mL (2.5 mg total) by nebulization every 6 (six) hours as needed for wheezing or shortness of breath.  0     dextromethorphan-guaiFENesin (ROBITUSSIN-DM)  mg/5 mL liquid Take 10 mL by mouth every 4 (four) hours as needed (cough).       furosemide (LASIX) 40 MG tablet Take 0.5 tablets (20 mg total) by mouth daily as needed (Extra dose at noon eache day if weight gain> 2lbs in 24 hours).  0     furosemide (LASIX) 40 MG tablet Take 1 tablet (40 mg total) by mouth daily.  0     gentamicin (GARAMYCIN) 0.1 % ointment Apply as instructed to ulcer daily 30 g 1     glipiZIDE (GLUCOTROL) 5 MG tablet Take 5 mg by mouth 2 (two) times a day before meals.       Eusebio  Therapuetic Nutrition 7-7-1.5 gram PwPk Take 1 packet by mouth daily.       lisinopril (PRINIVIL,ZESTRIL) 10 MG tablet Take 10 mg by mouth daily.       loperamide (IMODIUM) 2 mg capsule Take 1 capsule (2 mg total) by mouth 3 (three) times a day as needed for diarrhea.  0     metoprolol succinate (TOPROL-XL) 50 MG 24 hr tablet Take 50 mg by mouth 2 (two) times a day.        multivitamin with minerals (THERA-M) 9 mg iron-400 mcg Tab tablet Take 1 tablet by mouth daily.       mupirocin (BACTROBAN) 2 % ointment Apply as instructed to ulcer daily 30 g 1     nitroglycerin (NITROSTAT) 0.4 MG SL tablet Place 0.4 mg under the tongue every 5 (five) minutes as needed for chest pain.        omeprazole (PRILOSEC) 20 MG capsule Take 20 mg by mouth daily.        simvastatin (ZOCOR) 40 MG tablet Take 40 mg by mouth at bedtime.        spironolactone (ALDACTONE) 25 MG tablet Take 1 tablet (25 mg total) by mouth daily.  0     urea-alpha hydroxy acids (ATRAC-TAIN, WITH AHA,) 10-4 % Crea Apply 1 application topically daily. Apply after washing legs & feet with water       WARFARIN SODIUM (WARFARIN ORAL) Take by mouth. 3/26/18 INR 2.9 Cont 3.5mg qd. Next INR 4/2.  3/21/18 INR 3.1 Cont 3.5mg qd. Next INR 3/26.  3/15/18 INR 1.6 3.5mg qd. Next INR 3/21.       Current Facility-Administered Medications   Medication Dose Route Frequency Provider Last Rate Last Dose     lidocaine 2 % jelly (XYLOCAINE)   Topical PRN Miriam Quinones, CNP   1 application at 02/19/18 0929       Allergies   Allergen Reactions     Latex      Penicillins        Physical Exam:    Vitals:    05/14/18 1025   BP: 98/56   Pulse: 80   Resp: 20   Temp: 97.9  F (36.6  C)    There is no height or weight on file to calculate BMI.    General:  73 y.o. male in no apparent distress.    Psychiatric:  Alert and oriented x 3.  Cooperative.   Integumentary:  Skin is uniformly warm and dry  Lower extremity edema: minimal    Left Foot Ulceration(s):     Wound bed:%100  granulation       Undermining no, Tunneling no   Wound Edge: attached  Periwound:  There is no erika wound erythema and maceration but no warmth, induration, fluctuance surrounding the ulcer(s). There is periwound rash and some denudement.  Exudate:serous          Quantity: minimal  Odor:  absent   Wound Shape oval    Labs:    No results found for: SEDRATE  Lab Results   Component Value Date    CRP 8.6 (H) 10/31/2017     No components found for: CREATINE  Lab Results   Component Value Date    BUN 40 (H) 04/20/2018     Lab Results   Component Value Date    HGBA1C 7.3 (H) 04/20/2018         Vasc Edema 2/8/2018 2/19/2018 3/9/2018 4/2/2018 5/14/2018   Right just above MTP 23 23 24.5 27.4 23.6   Right Ankle 27 26 26.5 24.4 27.6   Right Widest Calf 36 37.5 36.5 34 37.2   Right Thigh Up 10cm - - - - 46.2   Left - just above MTP 26 28 28 24.2 28.2   Left Ankle 22 25 24.5 25.5 25.5   Left Widest Calf 33 35 35 35.3 35.5   Left Thigh Up 10cm - - - - 44.9       Wound 12/22/17 L amp medial (Active)   Pre Size Length 0.5 5/14/2018 10:00 AM   Pre Size Width 0.2 5/14/2018 10:00 AM   Pre Size Depth 0 5/14/2018 10:00 AM   Pre Total Sq cm 0.1 5/14/2018 10:00 AM   Post Size Length 0.6 5/14/2018 11:00 AM   Post Size Width 0.2 5/14/2018 11:00 AM   Post Size Depth 0.1 4/30/2018  2:00 PM       Wound 01/25/18 Non-pressure related ulcer Toe Right (Active)       Wound 01/25/18 Amputation Right;3rd digit (Active)       Wound 01/25/18 Non-pressure related ulcer Foot Left (Active)       Incision 01/25/18 Toe Right;2nd digit (Active)       Assessment:  1. Ulcer of foot, limited to breakdown of skin, unspecified laterality     2. S/P transmetatarsal amputation of foot, left     3. Polyneuropathy associated with underlying disease     4. Type 2 diabetes mellitus with foot ulcer, without long-term current use of insulin         A new wound was identified today: No      Plan:  1.  Debridement of the left leg ulcer was recommended.  After consent was obtained and  topical 2% Xylocaine was applied under clean conditions, and using a #15 blade,the devitalized dermal tissue was debrided for a total square centimeters of 0.10.  Following debridement, there was a decrease in the nonviable tissue. The patient tolerated the procedure without any difficulty.     2.  Left foot ulceration.  Ulcer size is smaller.     3.  Venous insufficiency,  Stable    4.  Dermatitis,  He has signs of contact dermatitis.  Will discontinue the Kerra Dayo dressing.    5.  Edema, stable.    6.  Diabetes, on Metformin.  6.6 A1c on 11/1/2017.      7.  Treatment:  Will continue with Iodoflex to decrease the biofilm.  It will be discontinue the Kerra Dayo and apply a gauze dressing.  It will be secured with either ABD and cloth tape or roll gauze.  Encouraged him to make his appointment with Andrew.  8.  Patient will follow up with me in 2 weeks for further evaluation and response to the treatment.  If the ulcer fails to improve, I will consider daily Bactroban and gentamycin.      Miriam Quinones, APRN, CNP,  [facility]  [phone]

## 2021-06-18 NOTE — PROGRESS NOTES
[facility]    Facility:   [facility] [[id number]]   Code Status: FULL CODE      CHIEF COMPLAINT/REASON FOR VISIT:  Chief Complaint   Patient presents with     Problem Visit       HISTORY:      HPI: Ever is a 73 y.o. male who asked that I see him today regarding an ongoing rash of the groin area which he feels is getting worse and that there has been bleeding from the scrotal skin.  His urine at home.  Does not have fevers or chills.  Does not feel sick in other ways.  It is not painful but it is concerning to him that it is bleeding.    Past Medical History:   Diagnosis Date     Atrial flutter      Coronary Artery Disease     CABG x2     Diabetic ulcer of both feet 10/31/2017     Dyslipidemia, goal LDL below 70      Foot ulcer      Gangrene of left foot      Hypertension      Neuropathy      Paroxysmal Atrial Fibrillation     Brian Moe: 2011 Cardioversion; CHADS2 = 2 he is on warfarin and sotalol      Type 2 Diabetes Mellitus              Family History   Problem Relation Age of Onset     Sudden death Mother 85     CABG Father      Valvular heart disease Father      Esophageal cancer Father 58      of this     No Medical Problems Son      Social History     Social History     Marital status:      Spouse name: N/A     Number of children: 1     Years of education: N/A     Occupational History      Self Employed     active     Social History Main Topics     Smoking status: Former Smoker     Types: Cigarettes     Quit date: 2000     Smokeless tobacco: Never Used     Alcohol use 0.0 oz/week     2 - 3 Glasses of wine, 2 - 3 Cans of beer per week     Drug use: No     Sexual activity: Yes     Other Topics Concern     Not on file     Social History Narrative    Two granddaughters Haven Gipson    Patient Lives with son Raciel  2017             Review of Systems   Constitutional: Negative for chills and fever.       .  Vitals:    18 1422    BP: 121/71   Pulse: 72   Resp: 20   SpO2: 93%       Physical Exam   Constitutional: No distress.   Neurological: He is alert.   Skin:   There is intense redness for the groin area bilaterally and also the scrotal skin   Psychiatric: He has a normal mood and affect.   Nursing note and vitals reviewed.        LABS:   No new laboratory testing    ASSESSMENT:      ICD-10-CM    1. Candidiasis of perineum B37.49        PLAN:    Diflucan 100 mg by mouth 1 pill every 3 days for 3 times then 1 pill weekly ×6.  I mentioned this would hopefully help for the ongoing problem of rash and will clear it better.  I also wrote for nystatin ointment 100,000 units to be applied twice daily for 2 weeks.      Electronically signed by: Carl Young MD

## 2021-06-18 NOTE — PROGRESS NOTES
Date of Service:6/21/2018    Chief Complaint:   Chief Complaint   Patient presents with     Edema       History:    Ever presents to clinic for evaluation of his foot ulcers.  He has a history that is significant for CAD, diabetes, and venous insufficiency. Status post left transmetatarsal amputation was performed on 11/5/17 by Dr. DESTINY Wick.   On 11/9/2017 he was discharged to a TCU where he currently resides. He is able to do full weight bearing on his heel, per Dr. DESTINY Mchugh on January 5.     The patient was last seen by me on 5/131/2018 when Iodoflex was discontinued and dry gauze applied.  He presents to clinic without any dressing on the weeping/open area.  He is wearing Tubigrip that the home provided for him.  Unfortunately, he has reacted to them.  If the Tubigrip has latex in them, he maybe allergic to them.  Instructed again today to only wear stockings that our clinic provides for him.  He denies any pain in his wound and there is minimal drainage. He is moving to an assisted living this next week and will be receiving care by Remi Eid.      Current Outpatient Prescriptions   Medication Sig Dispense Refill     acetaminophen (TYLENOL) 500 MG tablet Take 2 tablets (1,000 mg total) by mouth every 6 (six) hours as needed.  0     albuterol (PROVENTIL) 2.5 mg /3 mL (0.083 %) nebulizer solution Take 3 mL (2.5 mg total) by nebulization every 6 (six) hours as needed for wheezing or shortness of breath.  0     furosemide (LASIX) 40 MG tablet Take 0.5 tablets (20 mg total) by mouth daily as needed (Extra dose at noon eache day if weight gain> 2lbs in 24 hours).  0     furosemide (LASIX) 40 MG tablet Take 1 tablet (40 mg total) by mouth daily.  0     gentamicin (GARAMYCIN) 0.1 % ointment Apply as instructed to ulcer daily 30 g 1     glipiZIDE (GLUCOTROL) 5 MG tablet Take 10 mg by mouth 2 (two) times a day before meals. 10mg in am and 15mg in evening       lisinopril (PRINIVIL,ZESTRIL) 10 MG tablet Take 10 mg by  mouth daily.       loperamide (IMODIUM) 2 mg capsule Take 1 capsule (2 mg total) by mouth 3 (three) times a day as needed for diarrhea.  0     metoprolol succinate (TOPROL-XL) 50 MG 24 hr tablet Take 50 mg by mouth 2 (two) times a day.        nitroglycerin (NITROSTAT) 0.4 MG SL tablet Place 0.4 mg under the tongue every 5 (five) minutes as needed for chest pain.        omeprazole (PRILOSEC) 20 MG capsule Take 20 mg by mouth daily.        simvastatin (ZOCOR) 40 MG tablet Take 40 mg by mouth at bedtime.        spironolactone (ALDACTONE) 25 MG tablet Take 1 tablet (25 mg total) by mouth daily.  0     triamcinolone (KENALOG) 0.1 % cream Apply to rash daily, but no longer than 2 weeks 30 g 1     urea-alpha hydroxy acids (ATRAC-TAIN, WITH AHA,) 10-4 % Crea Apply 1 application topically daily. Apply after washing legs & feet with water       WARFARIN SODIUM (WARFARIN ORAL) Take 1.5 mg by mouth daily. 6/14/18 INR 6.6. Vitk 5mg x1 now. Next INR 06/15/18  6/13/18 INR 5.8 (? Pool nurse did in house) HOLD today, Next INR 6/14. IS on Diflucan daily X 14days.  5/30/18 INR 3.2 3mg daily.  5/23/18 INR 3.3  3/26/18 INR 2.9 Cont 3.5mg qd. Next INR 4/2.  3/21/18 INR 3.1 Cont 3.5mg qd. Next INR 3/26.       Current Facility-Administered Medications   Medication Dose Route Frequency Provider Last Rate Last Dose     lidocaine 2 % jelly (XYLOCAINE)   Topical PRN Miriam Quinones, CNP   1 application at 02/19/18 0929       Allergies   Allergen Reactions     Latex      Penicillins        Physical Exam:    Vitals:    06/21/18 1141   BP: 110/76   Pulse: 76   Resp: 24   Temp: 97.6  F (36.4  C)    There is no height or weight on file to calculate BMI.    General:  73 y.o. male in no apparent distress.    Psychiatric:  Alert and oriented x 3.  Cooperative.   Integumentary:  Skin is uniformly warm and dry.  Right and left lower legs with a rash and weeping noted on the left leg.  Lower extremity edema: minimal    Left Foot Ulceration(s):  resolved    Wound Shape oval    Labs:    No results found for: SEDRATE  Lab Results   Component Value Date    CRP 8.6 (H) 10/31/2017     No components found for: CREATINE  Lab Results   Component Value Date    BUN 40 (H) 04/20/2018     Lab Results   Component Value Date    HGBA1C 7.3 (H) 04/20/2018         Vasc Edema 2/19/2018 3/9/2018 4/2/2018 5/14/2018 6/21/2018   Right just above MTP 23 24.5 27.4 23.6 23   Right Ankle 26 26.5 24.4 27.6 24   Right Widest Calf 37.5 36.5 34 37.2 35.7   Right Thigh Up 10cm - - - 46.2 51.6   Left - just above MTP 28 28 24.2 28.2 27.3   Left Ankle 25 24.5 25.5 25.5 22.7   Left Widest Calf 35 35 35.3 35.5 34.2   Left Thigh Up 10cm - - - 44.9 49       Wound 06/21/18 LEFT FOOT ANTERIOR (Active)   Pre Size Length 1 6/21/2018 11:00 AM   Pre Size Width 1.5 6/21/2018 11:00 AM   Pre Size Depth 0 6/21/2018 11:00 AM   Pre Total Sq cm 1.5 6/21/2018 11:00 AM       Wound 01/25/18 Non-pressure related ulcer Toe Right (Active)       Wound 01/25/18 Amputation Right;3rd digit (Active)       Wound 01/25/18 Non-pressure related ulcer Foot Left (Active)       Incision 01/25/18 Toe Right;2nd digit (Active)       Assessment:  1. Ulcer of foot, limited to breakdown of skin, unspecified laterality (H)     2. Venous stasis dermatitis of left lower extremity  triamcinolone (KENALOG) 0.1 % cream   3. Acquired lymphedema of lower extremity     4. Type 2 diabetes mellitus with diabetic peripheral angiopathy and gangrene, without long-term current use of insulin (H)     5. Polyneuropathy associated with underlying disease (H)     6. Venous hypertension, chronic, bilateral         A new wound was identified today: No      Plan:  1.  No debridement completed today    2.  Left foot ulceration.  Weeping slightly.    3.  Venous insufficiency,  Stable.  After he transitions into assisted living, I will discuss with him long term compression.      4.  Dermatitis,  Worse.  Primarily on his left leg.  He presents to clinic  with Tubigrip given to him by the care center.  He has not thrown his Tubigrip that he has at home away.  He was instructed again today to discard all Tubigrip that he has at home.  Also wrote a prescription for TMC to help resolve the rash.      5.  Edema, stable.    6.  Diabetes, on Metformin.  6.6 A1c on 11/1/2017.  He has not received his orthotic shoes.  His sister will call [facility].    Patient has Diabetes Mellitus and I have noted in the foot exam that they have a  history of amputation of the foot or part of the foot, History of Ulcer, history of Pre-Ulcerative Callous and foot deformity.    7.  Treatment:  Will apply adaptic over the weeping area and cover it with an ABD until the weeping stops.  At this time, he will not have to wear any bandage.    8.  Patient will follow up with me in 3 weeks for further evaluation and response to the treatment.      Miriam Quinones, APRN, CNP,  [facility]  [phone]

## 2021-06-19 NOTE — PROGRESS NOTES
Date of Service:7/12/2018    Chief Complaint:   Chief Complaint   Patient presents with     Edema       History:    Ever presents to clinic for evaluation of his foot ulcers.  He has a history that is significant for CAD, diabetes, and venous insufficiency. Status post left transmetatarsal amputation was performed on 11/5/17 by Dr. DESTINY Wick.   On 11/9/2017 he was discharged to a TCU where he currently resides. He is able to do full weight bearing on his heel, per Dr. DESTINY Mchugh on January 5.     The patient was last seen by me on 6/21/2018 when his ulcer was mostly healed.  Since his last visit, he moved to an assisted living.  His ulcer is no longer weeping.  He is not wearing his Tubigrip because he is still trying to get settled.  He does not want to wear any form of compression other than Tubigrip.  Discussed purchasing the Dermafit (Tubigrip) and emphasized the importance of order the Dermafit because it is non-latex.  He will be picking up his new orthotics today.    Current Outpatient Prescriptions   Medication Sig Dispense Refill     acetaminophen (TYLENOL) 500 MG tablet Take 2 tablets (1,000 mg total) by mouth every 6 (six) hours as needed.  0     albuterol (PROVENTIL) 2.5 mg /3 mL (0.083 %) nebulizer solution Take 3 mL (2.5 mg total) by nebulization every 6 (six) hours as needed for wheezing or shortness of breath.  0     furosemide (LASIX) 40 MG tablet Take 0.5 tablets (20 mg total) by mouth daily as needed (Extra dose at noon eache day if weight gain> 2lbs in 24 hours).  0     furosemide (LASIX) 40 MG tablet Take 1 tablet (40 mg total) by mouth daily.  0     gentamicin (GARAMYCIN) 0.1 % ointment Apply as instructed to ulcer daily 30 g 1     glipiZIDE (GLUCOTROL) 5 MG tablet Take 10 mg by mouth 2 (two) times a day before meals. 10mg in am and 15mg in evening       lisinopril (PRINIVIL,ZESTRIL) 10 MG tablet Take 10 mg by mouth daily.       loperamide (IMODIUM) 2 mg capsule Take 1 capsule (2 mg total) by  mouth 3 (three) times a day as needed for diarrhea.  0     metoprolol succinate (TOPROL-XL) 50 MG 24 hr tablet Take 50 mg by mouth 2 (two) times a day.        nitroglycerin (NITROSTAT) 0.4 MG SL tablet Place 0.4 mg under the tongue every 5 (five) minutes as needed for chest pain.        omeprazole (PRILOSEC) 20 MG capsule Take 20 mg by mouth daily.        simvastatin (ZOCOR) 40 MG tablet Take 40 mg by mouth at bedtime.        spironolactone (ALDACTONE) 25 MG tablet Take 1 tablet (25 mg total) by mouth daily.  0     triamcinolone (KENALOG) 0.1 % cream Apply to rash daily, but no longer than 2 weeks 30 g 1     urea-alpha hydroxy acids (ATRAC-TAIN, WITH AHA,) 10-4 % Crea Apply 1 application topically daily. Apply after washing legs & feet with water       WARFARIN SODIUM (WARFARIN ORAL) Take 1.5 mg by mouth daily. 6/14/18 INR 6.6. Vitk 5mg x1 now. Next INR 06/15/18  6/13/18 INR 5.8 (? Pool nurse did in house) HOLD today, Next INR 6/14. IS on Diflucan daily X 14days.  5/30/18 INR 3.2 3mg daily.  5/23/18 INR 3.3  3/26/18 INR 2.9 Cont 3.5mg qd. Next INR 4/2.  3/21/18 INR 3.1 Cont 3.5mg qd. Next INR 3/26.       Current Facility-Administered Medications   Medication Dose Route Frequency Provider Last Rate Last Dose     lidocaine 2 % jelly (XYLOCAINE)   Topical PRN Miriam Quinones, CNP   1 application at 02/19/18 0929       Allergies   Allergen Reactions     Latex      Penicillins        Physical Exam:    Vitals:    07/12/18 0900   BP: 156/70   Pulse: 60   Resp: 18   Temp: 98.1  F (36.7  C)    There is no height or weight on file to calculate BMI.    General:  73 y.o. male in no apparent distress.    Psychiatric:  Alert and oriented x 3.  Cooperative.   Integumentary:  Skin is uniformly warm and dry.    Lower extremity edema: minimal    Left Foot Ulceration(s): resolved    Wound Shape oval    Labs:    No results found for: SEDRATE  Lab Results   Component Value Date    CRP 8.6 (H) 10/31/2017     No components found for:  CREATINE  Lab Results   Component Value Date    BUN 40 (H) 04/20/2018     Lab Results   Component Value Date    HGBA1C 7.3 (H) 04/20/2018         Vasc Edema 3/9/2018 4/2/2018 5/14/2018 6/21/2018 7/12/2018   Right just above MTP 24.5 27.4 23.6 23 24.2   Right Ankle 26.5 24.4 27.6 24 24.5   Right Widest Calf 36.5 34 37.2 35.7 37.8   Right Thigh Up 10cm - - 46.2 51.6 47   Left - just above MTP 28 24.2 28.2 27.3 27.8   Left Ankle 24.5 25.5 25.5 22.7 22.2   Left Widest Calf 35 35.3 35.5 34.2 33.3   Left Thigh Up 10cm - - 44.9 49 44.7       Wound 06/21/18 LEFT FOOT ANTERIOR (Active)   Pre Size Length 1 6/21/2018 11:00 AM   Pre Size Width 1.5 6/21/2018 11:00 AM   Pre Size Depth 0 6/21/2018 11:00 AM   Pre Total Sq cm 1.5 6/21/2018 11:00 AM       Wound 01/25/18 Non-pressure related ulcer Toe Right (Active)       Wound 01/25/18 Amputation Right;3rd digit (Active)       Wound 01/25/18 Non-pressure related ulcer Foot Left (Active)       Incision 01/25/18 Toe Right;2nd digit (Active)       Assessment:  1. Acquired lymphedema of lower extremity     2. Type 2 diabetes mellitus with diabetic peripheral angiopathy and gangrene, without long-term current use of insulin (H)     3. Polyneuropathy associated with underlying disease (H)         A new wound was identified today: No      Plan:  1.  Left foot ulceration.  resolved.    3.  Venous insufficiency,  Stable.  He agrees to purchase Tubigrip for compression long term.      4.  Dermatitis,  resolved      5.  Edema, stable.    6.  Diabetes, on Metformin.  6.6 A1c on 11/1/2017.  He has not received his orthotic shoes.  His sister will call [facility].    7.  Treatment:  No dressing is needed.  He will wear Dermafit for compression.    8.  Patient will follow up with me in 2 months for further evaluation and response to the treatment.      Miriam Quinones, APRN, CNP,  [facility]  [phone]

## 2021-06-19 NOTE — PROGRESS NOTES
[facility]    DATE: 2018    NAME:  Ever Crane             :  1945  MRN: [id number]  CODE STATUS:  FULL CODE    VISIT TYPE: Problem Visit (hospital f/u)     FACILITY:  [facility] [[id number]]       CHIEF COMPLAIN/REASON FOR VISIT:    Chief Complaint   Patient presents with     Problem Visit     hospital f/u               HISTORY OF PRESENT ILLNESS: Ever Crane is a 73 y.o. male who was planned to discharge from [facility] on  to Assisted living, however was found on  to be driving erratically, lethargic, confused, incontinent and was sent to the ED. He was treated for CAP with vanco and zosyn but continued to have leukocytosis and was switched to levaquin. He continued to have fevers and tachypnea so on  had US and xray of abdomen, echo, head CT which were all negative. He was then switched to cefepime and vanco for broader coverage, blood cultures showed no growth. He was discharged to [facility] on . He has PMH of PVD with transmetetarsal amputation of left foot on , Type 2 DM, venous stasis ulcers, Atrial flutter, CAD, CABG x2, HLD, HTN, neuropathy, PAF.     Today Mr. Crane is seen today for follow up from hospital stay and per staff will be discharging today to [facility]. Today Mr. Crane states he is ready to go and feels much better than before. He is not having any shortness of breath or coughing. He denies any fevers since returning to the facility. He has no pain or other concerns. He is anxious to transfer today. He says his car was impounded and he will need a notorized letter to have it released. His sister will be driving him to his new apartment today. Per staff no concerns since returning and blood sugars have been stable. Vitals have been stable and he has been alert and oriented. Staff requesting new discharge orders be signed and physical forms completed for new facility. See full discharge  summary completed on 6/20.     REVIEW OF SYSTEMS:  PROBLEMS AND REVIEW OF SYSTEMS:   Review of Systems  Today on ROS:   Currently, no fever, chills, or rigors. Does not have any visual or hearing problems. Denies any chest pain, headaches, palpitations, lightheadedness, dizziness. Appetite is good. Denies any GERD symptoms. Denies any difficulty with swallowing, nausea, or vomiting.  Denies any abdominal pain, diarrhea or constipation. Denies any urinary symptoms. No insomnia. No active bleeding. Wounds on feet/legs, denies sob today, positive for leg edema, perineal rash , no cough or fevers      Allergies   Allergen Reactions     Latex      Penicillins      Current Outpatient Prescriptions   Medication Sig     acetaminophen (TYLENOL) 500 MG tablet Take 2 tablets (1,000 mg total) by mouth every 6 (six) hours as needed.     albuterol (PROVENTIL) 2.5 mg /3 mL (0.083 %) nebulizer solution Take 3 mL (2.5 mg total) by nebulization every 6 (six) hours as needed for wheezing or shortness of breath.     furosemide (LASIX) 40 MG tablet Take 0.5 tablets (20 mg total) by mouth daily as needed (Extra dose at noon eache day if weight gain> 2lbs in 24 hours).     furosemide (LASIX) 40 MG tablet Take 1 tablet (40 mg total) by mouth daily.     gentamicin (GARAMYCIN) 0.1 % ointment Apply as instructed to ulcer daily     glipiZIDE (GLUCOTROL) 5 MG tablet Take 10 mg by mouth 2 (two) times a day before meals. 10mg in am and 15mg in evening     lisinopril (PRINIVIL,ZESTRIL) 10 MG tablet Take 10 mg by mouth daily.     loperamide (IMODIUM) 2 mg capsule Take 1 capsule (2 mg total) by mouth 3 (three) times a day as needed for diarrhea.     metoprolol succinate (TOPROL-XL) 50 MG 24 hr tablet Take 50 mg by mouth 2 (two) times a day.      nitroglycerin (NITROSTAT) 0.4 MG SL tablet Place 0.4 mg under the tongue every 5 (five) minutes as needed for chest pain.      omeprazole (PRILOSEC) 20 MG capsule Take 20 mg by mouth daily.      simvastatin  (ZOCOR) 40 MG tablet Take 40 mg by mouth at bedtime.      spironolactone (ALDACTONE) 25 MG tablet Take 1 tablet (25 mg total) by mouth daily.     triamcinolone (KENALOG) 0.1 % cream Apply to rash daily, but no longer than 2 weeks     urea-alpha hydroxy acids (ATRAC-TAIN, WITH AHA,) 10-4 % Crea Apply 1 application topically daily. Apply after washing legs & feet with water     WARFARIN SODIUM (WARFARIN ORAL) Take 1.5 mg by mouth daily. 6/14/18 INR 6.6. Vitk 5mg x1 now. Next INR 06/15/18  6/13/18 INR 5.8 (? Pool nurse did in house) HOLD today, Next INR 6/14. IS on Diflucan daily X 14days.  5/30/18 INR 3.2 3mg daily.  5/23/18 INR 3.3  3/26/18 INR 2.9 Cont 3.5mg qd. Next INR 4/2.  3/21/18 INR 3.1 Cont 3.5mg qd. Next INR 3/26.     Past Medical History:    Past Medical History:   Diagnosis Date     Atrial flutter (H)      Candidiasis of perineum 1/3/2018     Coronary artery disease due to lipid rich plaque 2000    CABG x2     Diabetic ulcer of both feet (H) 10/31/2017     Dyslexia      Dyslipidemia, goal LDL below 70 2000     Essential hypertension      Gangrene of left foot (H)      Neuropathy (H)      Paroxysmal Atrial Fibrillation     Moe Brian: 8/2011 Cardioversion; CHADS2 VASC = 5; he is on warfarin and sotalol      Type 2 diabetes mellitus, without long-term current use of insulin (H)      Unable to function independently 11/13/2017           PHYSICAL EXAMINATION  Vitals:    07/01/18 2147   BP: 128/63   Pulse: 72   Resp: 20   Temp: 97.2  F (36.2  C)   SpO2: 97%   Weight: 195 lb (88.5 kg)     Today on physical exam:      GENERAL: Awake, Alert, oriented x3, not in any form of acute distress, answers questions appropriately, follows simple commands, conversant  HEENT: Head is normocephalic with normal hair distribution. No evidence of trauma. Ears: No acute purulent discharge. Eyes: Conjunctivae pink with no scleral jaundice. Nose: Normal mucosa and septum. NECK: Supple with no cervical or supraclavicular  lymphadenopathy. Trachea is midline.   CHEST: No tenderness or deformity, no crepitus  LUNG: dim to auscultation with good chest expansion. There are no crackles or wheezes, normal AP diameter.  BACK: No kyphosis of the thoracic spine. Symmetric, no curvature, ROM normal, no CVA tenderness, no spinal tenderness   CVS: irregularly irregular, rhythm, there are no murmurs, rubs, gallops, or heaves,  2+ pulses symmetric in all extremities.  ABDOMEN: Rounded and soft, nontender to palpation, non distended, no masses, no organomegaly, good bowel sounds, no rebound or guarding, no peritoneal signs.   EXTREMITIES: 1-2+ ble pedal edema, denisse discoloration ble, transmetatarsal amputation left foot site and wounds covered with dressing, scattered venous stasis ulcers  SKIN: Warm and dry, perineal rash maculopapular   NEUROLOGICAL: The patient is oriented to person, place and time. Strength and sensation are grossly intact. Face is symmetric.Irritable at times, noncompliant at times. Irrational judgement at times. BLE neuropathy            LABS:   No results found for this or any previous visit (from the past 168 hour(s)).  Results for orders placed or performed in visit on 04/20/18   Basic Metabolic Panel   Result Value Ref Range    Sodium 137 136 - 145 mmol/L    Potassium 4.6 3.5 - 5.0 mmol/L    Chloride 105 98 - 107 mmol/L    CO2 23 22 - 31 mmol/L    Anion Gap, Calculation 9 5 - 18 mmol/L    Glucose 217 (H) 70 - 125 mg/dL    Calcium 9.7 8.5 - 10.5 mg/dL    BUN 40 (H) 8 - 28 mg/dL    Creatinine 1.37 (H) 0.70 - 1.30 mg/dL    GFR MDRD Af Amer >60 >60 mL/min/1.73m2    GFR MDRD Non Af Amer 51 (L) >60 mL/min/1.73m2         Lab Results   Component Value Date    WBC 7.3 04/20/2018    HGB 12.8 (L) 04/20/2018    HCT 40.1 04/20/2018    MCV 87 04/20/2018     04/20/2018       Lab Results   Component Value Date    QBCREOZQ84 300 04/11/2018     Lab Results   Component Value Date    HGBA1C 7.3 (H) 04/20/2018     Lab Results    Component Value Date    INR 1.72 (H) 01/28/2018    INR 1.98 (H) 01/27/2018    INR 2.13 (H) 01/26/2018     No results found for: [id number]  Lab Results   Component Value Date    TSH 4.46 04/11/2018           ASSESSMENT/PLAN:    1. Candidiasis of perineal region: Recurrent, nystatin cream and powder-on miconazole.   2. Left transmetatarsal amputation, severe PVD, venous stasis ulcers: Continues to follow with vascular clinic.   3. Type 2 DM: BG . Continue glipizide. Noncompliant with diet. On glipizide  10mg in am, continue 5mg in pm. Hga1c was 7.3 in April.   4. CAD, CABG: On metoprolol, nitrostat, simvastatin. F/u with cardiology   5. Atrial flutter: On metoprolol, rate controlled. On warfarin.   6. HTN: SBP 120s. Stable on lasix, metoprolol, lisinopril.    7. IBS: Imodium prn.   8. Acute on chronic CHF: Daily weights [phone] lbs. On lasix, continue LUKE hose/tubigrips. On lasix. F/u with cardiology 6/13. Edema is improved 1-2+ ble, noncompliant with diet at times.   9. MESHA on CKD: Cr 1.38-1.63. .   10.Hoarding behaviors: Moving today.    11. Neuropathy: BLE. F/u with neurology.   12. CAP: completed treatment, no further fevers, lethargy, confusion. No shortness of breath or cough and lungs clear today.         Electronically signed by: Preeti Conde NP    Total 35 minutes of which 75% was spent in counseling and coordination of care of the above plan    Time spent in interview and examination of patient, review of available records, and discussion with nursing staff. Continue care plan, efforts at therapy, and monitor nutritional status.

## 2021-06-19 NOTE — LETTER
Letter by Miriam Quinones CNP at      Author: Miriam Quinones CNP Service: -- Author Type: --    Filed:  Encounter Date: 2019 Status: (Other)       2019    [facility]  Fax: [phone] Wound Dressing Rx and Order Form  Customer Service: [phone] Order Status: New Order   Verbal: Susanne            Patient Info:  Name: Ever Crane  : 1945  Address:   [address]  [facility] [address]  Phone: [phone]      Insurance Info:  Primary: Payor: MEDICARE / Plan: MEDICARE A AND B / Product Type: Medicare /    Secondary: COMMERCIAL INSURANCE-GENERIC HE GENERIC COMMERCIAL  [id number] - (Medicare)      Physician Info:   Name:  Miriam Quinones CNP   Dept Address/Phones:   [address] [place] [address], [place] [address] [address] [phone]  Fax: [phone]    Lymphedema circumferential measurements (in cm):  No Data Recorded  No Data Recorded  No Data Recorded  No Data Recorded  No Data Recorded  No Data Recorded  No Data Recorded  No Data Recorded  Right just above MTP: 23.8    Right Ankle: 24.5    Right Widest Calf: 36.8    Right Thigh Up 10cm: 47    Left - just above MTP: 26.5    Left Ankle: 21.7    Left Widest Calf: 31.5    Left Thigh Up 10cm: 46.2      Wound info:  Encounter Diagnoses   Name Primary?   ? Leg ulcer, left, limited to breakdown of skin (H) Yes   ? Venous hypertension, chronic, bilateral    ? Acquired lymphedema of lower extremity    ? Non-insulin dependent type 2 diabetes mellitus (H)    ? Allergic contact dermatitis due to adhesives    ? Ischemic cardiomyopathy      VASC Wound 10/22/18 left medial leg vein harvest (below knee) (Active)   Pre Size Length 1 2019 12:00 PM   Pre Size Width 1 2019 12:00 PM   Pre Size Depth 0.1 2019 12:00 PM   Pre Total Sq cm 0.25 3/22/2019 12:00 PM   Post Size Length 0 2019 12:00 PM   Post Size Width 0 2019 12:00 PM   Post  Size Depth 0 4/5/2019 12:00 PM   Post Total Sq cm 0.4 3/15/2019 12:00 PM   Undermined no 1/22/2019  3:00 PM   Tunneling no 1/22/2019  3:00 PM   Description scab 3/29/2019  1:00 PM   Prodcut Used Bactroban 10/22/2018  2:00 PM       VASC Wound 11/12/18 left lateral lower leg (Active)   Pre Size Length 1 3/29/2019  1:00 PM   Pre Size Width 0.5 3/29/2019  1:00 PM   Pre Size Depth 0.1 3/29/2019  1:00 PM   Pre Total Sq cm 0.5 3/29/2019  1:00 PM   Post Size Length 1 4/5/2019 12:00 PM   Post Size Width 1.2 4/5/2019 12:00 PM   Post Size Depth 0.01 3/15/2019 12:00 PM   Post Total Sq cm 0 4/5/2019 12:00 PM   Undermined n 11/12/2018  2:00 PM   Tunneling n 11/12/2018  2:00 PM   Description weeping 12/3/2018  1:00 PM       Wound 08/11/18 Non-pressure related ulcer Leg Right;Anterior (front) (Active)       Wound 01/04/19 Non-pressure related ulcer Leg Left (Active)       Wound 01/04/19 Non-pressure related ulcer Leg Right (Active)       Incision 10/04/18 Leg Left (Active)     Drainage: Light  Thickness:  Partial  Duration of Need: 30  Days Supply: 30  Start Date: 4/5/19  Starter Kit: Ancillary Kit (saline, gloves, gauze)  Qualifying wound/Debridement Yes      Dressing Type Brand Size Number of pieces Frequency of change   Primary Tubular compression Dermafit (size F) 1x10m roll  32.8 ft/box Daily                                                                                                             Note: If total out of pocket is more than $50.00 please contact the patient before processing order.     OK to forward to covered supplier.     Electronically Signed Physician: Miriam Quinones CNP Date: 4/5/2019

## 2021-06-20 NOTE — PROGRESS NOTES
Clinical Nutrition Therapy Reassessment Note     Reason for Assessment:  protocol/pathway order.    Subjective: Patient receiving nursing cares at time of visit. Reviewed chart. Patient POD#4 for CABG x 3. PO is inconsistent, often poor. Trial Gelatein and Magic cup supplements to try to boost intake.     Current Nutrition Prescription:   Diet: NDD1 with nectar thick liquids  Supplements and Modulars: none    Current Nutrition Intake:  The patient's current meal intake is variable at 10-50% of NDD1 with Nectar thick liquids. Patient was NPO until 10/6 pm. Based on intake records of 10/7, patient consumed approximately 464 kcals/8 g protein and has consumed approximately 297 kcals/10 g protein so far today.    Total nutrient intake from all sources does not meet estimated nutritional needs.    Anthropometrics:  Height: 6' (1.829 m)  Most recent weight: 198 lb 1.6 oz (89.9 kg)  BMI:Body mass index is 26.87 kg/(m^2).  Ideal body weight: 178 lbs (81 kg) +/- 10%  Weight History:   10/08/18 198 lb 1.6 oz (89.9 kg)   10/07/18 202 lb 9.6 oz (91.9 kg)   10/06/18 202 lb (91.6 kg)   10/05/18 207 lb 3.2 oz (94 kg)   10/03/18 194 lb 12.8 oz (88.4 kg)   10/02/18 195 lb 3.2 oz (88.5 kg)   09/30/18 198 lb 11.2 oz (90.1 kg)   09/29/18 200 lb 1.6 oz (90.8 kg)   09/29/18 198 lb (89.8 kg)   09/01/18 202 lb 1.6 oz (91.7 kg)   08/31/18 198 lb (89.8 kg)   08/31/18 190 lb (86.2 kg)         RD Nutrition Focused Physical Exam:  The patient has the following physical signs which could indicate malnutrition: Previously assessed, no signs of wasting noted     GI Status/Output:   GI symptoms include:WDL per nurse  Bowel Sounds hypoactive per nurse  1-2 BMs/day per RN charting    Skin/Wound:  Clint score: 16  Surgical wound to sternum per RN notes s/p CABG x 3    Medications:  Medications reviewed.    Labs:    Results from last 7 days  Lab Units 10/08/18  0356   LN-SODIUM mmol/L 143   LN-POTASSIUM mmol/L 4.1   LN-CHLORIDE mmol/L 108*   LN-CO2  mmol/L 24   LN-BLOOD UREA NITROGEN mg/dL 33*   LN-CREATININE mg/dL 1.45*   LN-CALCIUM mg/dL 9.6       Assessed Nutritional Needs:  Assessment weight is 81 kg, with a weight source of ideal.    Estimated Energy Needs: 2750-1642 kcals (25-30 kcal/kg)    Estimated Protein Needs:  gm (1.2-1.5 gm/kg)    Estimated Fluid Needs: 8670-9048 mls (25-30 mls/kg)    Malnutrition Assessment:  Previously assessed, not noted    Nutrition Risk Level: moderate risk    Nutrition Diagnosis: Nutrition knowledge deficit r/t new Dx as evidenced by need for therapeutic diet    Goals: Participate in diet ed    Intervention: Diet ed before d/c    Monitoring: Diet advancement, education readiness.    See Care Plan for further Problems, Goals, and Interventions    Electronically signed by:  Mily Saavedra RD

## 2021-06-20 NOTE — ANESTHESIA PROCEDURE NOTES
Central line    Start time: 10/4/2018 8:35 AM  End time: 10/4/2018 8:48 AM  Patient location: OR Post-induction  Indications: central pressure monitoring and vascular access  Performing Anesthesiologist: YUE COLUNGA  Pre-procedure Checklist  Completed: patient identified, site marked, risks, benefits, and alternatives discussed, timeout performed, consent obtained, hand hygiene performed, all elements of maximal sterile barriers used including cap, mask, gown, sterile gloves, and large sheet and skin prep agent completely dried prior to procedure    Procedure Details:  Preparation: 2% chlorhexidine  Location details: right internal jugular  Catheter type: Introducer with Hands-Off  Introducer type: Cordis  Lumens:double lumenNumber of attempts: 1  Ultrasound evaluation of access site: yes  Vessel patent by US exam  Concurrent real time visualization of needle entry  manometry confirmation of venous access    Post-procedure:   line sutured and Antimicrobial disks with CHG applied  Assessment: blood return through all ports and free fluid flow  Complications: none

## 2021-06-20 NOTE — ED PROVIDER NOTES
"eMERGENCY dEPARTMENT eNCOUnter      CHIEF COMPLAINT    Chief Complaint   Patient presents with     Chest Pain       HPI    Ever Crane is a 73 y.o. male, with pertinent past medical history for paroxysmal atrial fibrillation, candidiasis of perineum, essential hypertension, coronary artery disease, atrial flutter, diabetic ulcer of both feet, type 2 diabetes mellitus, and dyslipidemia, who presents to the ED for evaluation of chest pain. Patient endorses he's had chest pain \"all his life\" but it has worsened over the past couple weeks. Patient notes he ate sandwich meat for supper and afterwards felt the chest pain. Patient's care facility in [place] did not have any antacids, which prompted his visit to the ED tonight. Patient reports his chest pain is still present here in the ED. Patient states it is \"almost like muscle aches under the skin but has a burning quality.\" Patient also classifies it as \"a scary ache.\" Patient endorses the chest pain radiates into his arms. Patient also reports nausea that began tonight, in addition to his symptoms. Patient had a CABG in 2000. He reports he is always in A-fib. No pacemaker. Patient states his blood sugars have been in the 200s lately, which is high for him. Patient denies any cough, fever, respiratory issues, nausea, abdominal pain, dizziness, lightheadedness, or any other complaints. Of note, patient is on blood thinners.     IYarely MD personally performed the services described in this documentation, as scribed by Deanne Vital in my presence, and it is both accurate and complete.      PAST MEDICAL HISTORY    Past Surgical History:   Procedure Laterality Date     CARDIOVERSION  8/25/2011     CORONARY ARTERY BYPASS GRAFT  3/6/2000    SVG to OM1, SVG to PDA     FOOT AMPUTATION Left 11/5/2017    Procedure: LEFT TRANSMETATARSAL AMPUTATION;  Surgeon: Ever Wick MD;  Location: [facility];  Service:      INGUINAL HERNIA REPAIR Bilateral 1969 " and 1979       Past Medical History:   Diagnosis Date     Atrial flutter (H)      Candidiasis of perineum 1/3/2018     Coronary artery disease due to lipid rich plaque 2000     Diabetic ulcer of both feet (H) 10/31/2017     Dyslexia      Dyslipidemia, goal LDL below 70 2000     Essential hypertension      Gangrene of left foot (H)      Neuropathy (H)      Paroxysmal Atrial Fibrillation      Type 2 diabetes mellitus, without long-term current use of insulin (H)      Unable to function independently 11/13/2017       CURRENT MEDICATIONS    Patient's Medications   New Prescriptions    No medications on file   Previous Medications    ACETAMINOPHEN (TYLENOL) 500 MG TABLET    Take 2 tablets (1,000 mg total) by mouth every 6 (six) hours as needed.    ALBUTEROL (PROVENTIL) 2.5 MG /3 ML (0.083 %) NEBULIZER SOLUTION    Take 3 mL (2.5 mg total) by nebulization every 6 (six) hours as needed for wheezing or shortness of breath.    FUROSEMIDE (LASIX) 20 MG TABLET    Take 20 mg by mouth daily as needed. If weight increases 2 lbs in 24 hours.    GLIPIZIDE (GLUCOTROL) 10 MG TABLET    Take 10 mg by mouth daily before breakfast.    GLIPIZIDE (GLUCOTROL) 10 MG TABLET    Take 15 mg by mouth every evening.    LACTOBACILLUS ACIDOPHILUS 100 MG (1 BILLION CELL) CAP    Take 1 capsule by mouth 2 (two) times a day.    LISINOPRIL (PRINIVIL,ZESTRIL) 5 MG TABLET    Take 5 mg by mouth daily.    LORATADINE (CLARITIN) 10 MG TABLET    Take 10 mg by mouth daily.    METOPROLOL SUCCINATE (TOPROL-XL) 50 MG 24 HR TABLET    Take 50 mg by mouth 2 (two) times a day.     MIN OIL-PETROLAT (AQUAPHOR) OINTMENT    Apply 1 application topically 3 (three) times a day as needed.    NITROGLYCERIN (NITROSTAT) 0.4 MG SL TABLET    Place 0.4 mg under the tongue every 5 (five) minutes as needed for chest pain.     OMEPRAZOLE (PRILOSEC) 20 MG CAPSULE    Take 20 mg by mouth daily.     SIMVASTATIN (ZOCOR) 40 MG TABLET    Take 40 mg by mouth at bedtime.     TRIAMCINOLONE  "(KENALOG) 0.1 % CREAM    Apply to lower legs and hands two times a day for 10 days only - not to face    VANCOMYCIN (VANCOCIN) 125 MG CAPSULE    Take 1 tab PO QID for 11 days, then 1 tab BID x7 days, then 1 tab daily x7 days, then 1 tab QOD x14 days then stop.    WARFARIN (COUMADIN) 4 MG TABLET    Take 4 mg by mouth daily. Recheck INR 9/6   Modified Medications    No medications on file   Discontinued Medications    No medications on file       ALLERGIES    Allergies   Allergen Reactions     Latex      Penicillins Rash     Childhood rxn       FAMILY HISTORY    Family History   Problem Relation Age of Onset     Sudden death Mother 85     CABG Father      Valvular heart disease Father      Esophageal cancer Father 58     cause of death     No Medical Problems Son      No Medical Problems Grandchild      No Medical Problems Grandchild        SOCIAL HISTORY    Social History     Social History     Marital status:      Spouse name: N/A     Number of children: 1     Years of education: N/A     Occupational History      Self Employed     he states he is still working despite being in TCU in 2018     Social History Main Topics     Smoking status: Former Smoker     Packs/day: 1.00     Years: 36.00     Types: Cigarettes     Start date: 6/13/1964     Quit date: 4/30/2000     Smokeless tobacco: Never Used     Alcohol use 3.0 oz/week     2 Glasses of wine, 3 Cans of beer per week     Drug use: No     Sexual activity: Not Currently     Partners: Female     Other Topics Concern     Not on file     Social History Narrative    Ever lived with his son Raciel in member, 2017, but then he went to [facility] after foot amputation.  Ever states he will move to a facility in [place] in June, 2018. The [facility] papers say he uses the \"blue ride\" but Ever states he has his own vehicle on the campus and is still doing plumbing work in the spring 2018.  Our reception staff at [facility] in [place] confirmed on " June 13, 2018 that Ever brought himself to the campus and did not utilize a family member or ride service.  I would also note that he states his granddaughter is name Claire and not Leonela, so I corrected that in the chart (ROSALIA Moe MD).           REVIEW OF SYSTEMS    Constitutional:  No reported fever, chills, weight loss, weakness, loss of appetite,   Eyes:  No pain, diplopia, vision loss,   HENT:  No reported sore throat, ear pain, dysphagia,   Respiratory: No reported SOB, wheeze, cough,  hemoptysis  Cardiovascular:  No reported CASTANO, palpitations, syncope. Chest Pain.   GI:  No reported  abdominal pain, vomiting, dark or bloody stools, diarrhea. Nausea.    Musculoskeletal:  No reported  new muscle/joint pain, swelling or loss of function. Pain radiating into arms.   Skin:  No reported rash   Neurologic:  No reported headache, focal weakness,  sensory changes, vertigo, seizure  Lymphatic:  No reported swollen glands     All other systems negative unless noted in HPI.    PHYSICAL EXAM    VITAL SIGNS: /81  Pulse 75  Temp 98  F (36.7  C) (Oral)   Resp (!) 3  Ht 6' (1.829 m)  Wt 198 lb (89.8 kg)  SpO2 95%  BMI 26.85 kg/m2  General Appearance: Alert, cooperative, normal speech and facial symmetry,  appears stated age,  NAD  Head:  Normocephalic, without obvious abnormality, atraumatic  Eyes:   conjunctiva/corneas clear,   ENT:  Lips, mucosa, and tongue normal; teeth and gums normal, no pharyngeal inflammation, no dysphonia or difficulty swallowing, membranes are moist without pallor  Neck:  Supple, symmetrical, trachea midline, no adenopathy;        thyroid:  No enlargement/tenderness/nodules; no carotid    bruit or JVD, no stridor  no soft tissue swelling or tenderness  Chest:  No tenderness or deformity,   Cardio:  Regular rate. Monitor notes patient is in Atrial flutter with 4:1 conduction, S1 and S2 normal, no murmur, rub or gallop, 2+ pulses symmetric in all upper extremities, trace pulse on  LLE, 1+ pulse on RLE, chronic LE edema  Pulm:  Clear to auscultation bilaterally, respirations unlabored,   Back:  Symmetric, no curvature, ROM normal, no CVA tenderness, no spinal tenderness  Abdomen:  Soft, non-tender, bowel sounds active all four quadrants, no organomegaly, no rebound or guarding, no peritoneal signs. Easily reducible midline hernia.   Extremities:  Extremities normal, there is no tenderness to palpation , atraumatic, no cyanosis or edema, full function and range of motion, pulses equal in all extremities, normal cap refill, no joint swelling  Skin:  No sign of cellulitis, trace cracking of skin on LE  Neuro:  Awake, alert, responsive to voice, follows commands, normal speech, No gross motor weakness or sensory loss, moves all extremities, cranial nerves are grossly intact, normal ambulation    LABS  Pertinent lab results reviewed in chart.  Results for orders placed or performed during the hospital encounter of 09/29/18   HM2 (CBC W/O DIFF)   Result Value Ref Range    WBC 8.5 4.0 - 11.0 thou/uL    RBC 4.63 4.40 - 6.20 mill/uL    Hemoglobin 12.9 (L) 14.0 - 18.0 g/dL    Hematocrit 40.1 40.0 - 54.0 %    MCV 87 80 - 100 fL    MCH 27.9 27.0 - 34.0 pg    MCHC 32.2 32.0 - 36.0 g/dL    RDW 13.2 11.0 - 14.5 %    Platelets 139 (L) 140 - 440 thou/uL    MPV 10.5 8.5 - 12.5 fL   Comprehensive Metabolic Panel   Result Value Ref Range    Sodium 136 136 - 145 mmol/L    Potassium 5.2 (H) 3.5 - 5.0 mmol/L    Chloride 106 98 - 107 mmol/L    CO2 18 (L) 22 - 31 mmol/L    Anion Gap, Calculation 12 5 - 18 mmol/L    Glucose 269 (H) 70 - 125 mg/dL    BUN 27 8 - 28 mg/dL    Creatinine 1.57 (H) 0.70 - 1.30 mg/dL    GFR MDRD Af Amer 53 (L) >60 mL/min/1.73m2    GFR MDRD Non Af Amer 44 (L) >60 mL/min/1.73m2    Bilirubin, Total 1.0 0.0 - 1.0 mg/dL    Calcium 9.5 8.5 - 10.5 mg/dL    Protein, Total 7.6 6.0 - 8.0 g/dL    Albumin 3.7 3.5 - 5.0 g/dL    Alkaline Phosphatase 103 45 - 120 U/L    AST 17 0 - 40 U/L    ALT 12 0 - 45 U/L    D-dimer, Quantitative   Result Value Ref Range    D-Dimer, Quant 0.31 <=0.50 FEU ug/mL   INR   Result Value Ref Range    INR 1.62 (H) 0.90 - 1.10   ECG 12 lead nursing unit performed   Result Value Ref Range    SYSTOLIC BLOOD PRESSURE 146 mmHg    DIASTOLIC BLOOD PRESSURE 86 mmHg    VENTRICULAR RATE 75 BPM    ATRIAL RATE 278 BPM    P-R INTERVAL  ms    QRS DURATION 88 ms    Q-T INTERVAL 390 ms    QTC CALCULATION (BEZET) 435 ms    P Axis  degrees    R AXIS 45 degrees    T AXIS 64 degrees    MUSE DIAGNOSIS       Atrial flutter with variable A-V block  Nonspecific ST abnormality  Abnormal ECG  When compared with ECG of 25-JAN-2018 11:10,  No significant change was found         EKG  I have independently review and interpreted the EKG, pending final cardiology read. A-flutter with variable conduction. ST slightly depressed in anterolateral leads.    RADIOLOGY  I have independently review and interpreted the  imaging, pending the final radiology read.    Xr Chest 1 View Portable    Result Date: 9/29/2018  XR CHEST 1 VIEW PORTABLE 9/29/2018 12:29 AM INDICATION: Chest pain COMPARISON: None. FINDINGS: Cardiomegaly. Pulmonary vascularity normal. Prior median sternotomy and CABG. The lungs are clear.      ED MEDS  Medications   aluminum-magnesium hydroxide-simethicone 15 mL, viscous lidocaine HC 15 mL (GI COCKTAIL) (30 mL Oral Given 9/29/18 0117)   sodium chloride 0.9% 500 mL (500 mL Intravenous New Bag 9/29/18 0227)   sodium bicarbonate 8.4 % (1 mEq/mL) injection 50 mEq (50 mEq Intravenous Given 9/29/18 0226)   insulin regular injection 5 Units (NovoLIN R) (5 Units Intravenous Given 9/29/18 0226)   dextrose 50 % (D50W) syringe 50 mL (50 mL Intravenous Given 9/29/18 0226)         MEDICAL DECISION MAKING    12:30AM The patient was interviewed and examined.  History in the chart was reviewed.   2:00AM Consulted with Dr. Roche, HE hospitalist, who agrees to admission.     This patient is a 73-year-old with history of coronary  artery bypass, diabetes with recent left partial foot amputation tenting with chest pain.  Pain is described as a tightness with radiation to the neck and is worrisome for coronary etiology.  He is in atrial flutter and this does appear to be chronic based upon prior EKGs.  There are some ST depressions in the anterior lateral leads these also appear similar to prior EKGs.  It is possible this could be gastritis or reflux but he will require more thorough workup given risk factors.  I will get a chest x-ray to evaluate for pulmonary etiology, basic labs, a d-dimer to evaluate for possible PE, BNP to evaluate for heart failure given lower extremity edema, and troponin to evaluate for cardiac ischemia.  He was given aspirin and nitroglycerin in route to the hospital with minimal improvement in symptoms.  I will give him a GI cocktail here.    Labs returned showing a normal troponin and normal d-dimer.  White blood cell count is normal.  His potassium is slightly elevated at 5.2 and chronic kidney disease appears stable.  Chronic anemia also is stable.  BNP is normal and chest x-ray does not show any atelectasis, infiltrate or effusion.    HEART Score for Major Cardiac Events  History: Moderately suspicious  EKG: Non-specific repolarization disturbance  Age: > or equal to 65  Risk factors: 3 or more risk factors or history of atherosclerotic disease  Initial troponin: < or equal to normal limit  HEART Score: 6  Scores 4-6: 12-16.6% risk of adverse cardiac event. In the HEART Score study, these patients were admitted to the hospital (11.6% in the retrospective study, 16.6% in the prospective study).    Because his heart score is in the moderate to high risk category, I feel observation in the hospital for serial troponins, cardiology consult and possible echocardiogram are warranted.  I will also give him some initial treatments for his slightly elevated potassium.  He has no EKG changes from this so I will give him  insulin with D50, a small fluid bolus and sodium bicarb.  He has a lasix allergy.    I discussed with the hospitalist who agrees to observation on cardiac telemetry. At the conclusion of the encounter I discussed  the results of all of the tests and the disposition with the patient.   All questions were answered.  He acknowledged understanding and was involved in the decision making regarding the overall care plan.      FINAL DIAGNOSIS:   1. Acute chest pain    2. Atrial flutter (H)    3. Hyperkalemia                       Yarely Chambers MD  09/29/18 2931

## 2021-06-20 NOTE — PROGRESS NOTES
Patient Name: Ever Crane   MRN: [id number]   Date of Admission: 9/29/2018     Procedure: REDO STERNOTOMY, CORONARY ARTERY BYPASS X3, ENDOSCOPIC VEIN HARVEST, INTERNAL MAMMARY ARTERY, ANESTHESIA TRANSESOPHAGEAL ECHOCARDIOGRAM AND EPI AORTIC ULTRASOUND     Post Op day #:4       Subjective (Patient focus/Primary Problem for shift): Safety and IS           Pain Goal 0 Pain Rating 6          Pain Medication/ Regime effective to reduce patient pain PRN Tylenol      Objective (Physical assessment):           Rhythm: NSR             Bowel Activity: Yes, bowel sounds present, active x4Q.  if Yes indicate when: 10/8          Bowel Medications: Yes             Incision: C/D/I, well approximated, healing          Incentive Spirometry Q 1-2 hour when awake:  Yes Volume: 1000          Epicardial Pacing Wires: No             Patient Activity:           Up to chair for meals: Yes          Ambulation with RN x2 (Not including CR): Yes           Is patient in home clothes: No             Chest Tubes                        Pleural: No Draining: No              Suction: No              Mediastinal: No Draining: No               Suction: No                        Dressing Change Daily: Yes If No, why? NA               Urinary Catheter: No            Preventative WOC consult (need MD order): Yes        Assessment (Nursing primary shift focus): Pt has been disoriented to time, and situation.  The writer worked on reorienting the patient frequently over the course of the day.  The patient is alert to self and place but not time or situation.  The patient pain appears to be well managed with PO Tylenol.  Continued to work with IS as frequently as possible.  The patient was able to get it up to 1000 and was on RA at the end of the day with O2 saturation in the low 90's.  The patient was made a soft 1:1 after his chest tubes were D/C and was appropriate.       Plan (Patient Care Plan/focus): Will continue to monitor closely, re-orient as  needed, use delirium interventions, and plan for a soft 1:1 overnight for pt safety overnight.  Additionally, will continue to encourage IS use and reinforce sternal precautions.  Ever Jaquez RN

## 2021-06-20 NOTE — PROGRESS NOTES
Respiratory Care:     RCAT done today, patient scored acuity level of 2. Incentive spirometer used to prevent atelectasis; flutter valve used for mucus clearing. Patient has been scoring 1200mL for me today with the IS. Breath sounds are diminished throughout, patient breathing between 18-20 bpm, SpO2 remained mid 90's on RA. Patient will do IS and FV independently. Will continue to monitor.     Ciara Cr, LRT

## 2021-06-20 NOTE — PROGRESS NOTES
Pt remains intubated. Currently on PSV 5/5, 40%. Pt tolerating it well. RT will follow and extubate when able.

## 2021-06-20 NOTE — PROGRESS NOTES
Speech Language/Pathology  Speech Therapy Daily Progress Note    Patient presents as lethargic and cooperative during this session.  An  was not applicable.    Objective  Verbal education provided re: results of video swallow study, diet recommendations, risk of aspiration and safe swallow strategies. Patient required occasional verbal cues to open eyes, but verbalized comprehension of education and reported ability to implement safe swallow strategies independently during meals. Given lethargy, as well as variable confusion anticipate need for reinforcement of education next session.  Deferred PO trials at this time d/t lethargy from pain medication.     Assessment  Pt indicated agreement with diet recommendations and dysphagia POC, but anticipate reinforcement of information will be needed in upcoming sessions. Initiated oral diet of NDD1/nNctar via spoon only - no straws or cups. Patient will likely need assistance with meals d/t weak upper extremities. Nursing to monitor closely for s/s aspiration during oral intake. Ok for medications in NDD1 textures.     Plan/Recommendations  Follow-up regarding diet tolerance tomorrow morning. If s/s aspiration observed during meals please change to NPO and notify speech therapy immediately.     The ST Care Plan has been reviewed and current plan remains appropriate.    15 dysphagia minutes     Sherron Tovar MA, CCC-SLP

## 2021-06-20 NOTE — PROGRESS NOTES
Pharmacy Consult: Warfarin Management    Pharmacy consulted to dose warfarin for Ever Crane, a 73 y.o. male    Ordering provider: Margaux Fierro PA-C    Reason for warfarin therapy: Atrial Fibrillation  Goal INR Range: 2-3    Subjective  Medication lists (home and hospital) were reviewed.    New medications that may increase bleeding risk/INR: Heparin subQ, aspirin, melatonin    Restarted home medications that may increase bleeding risk/INR:simvastatin, omeprazole    Restarted home medications that may result in decreased warfarin effect: spironolactone    Home Warfarin Dosing: Warfarin 5 mg MWF; warfarin 4 mg Tues, Thurs, Sat and Sun    Other Anticoagulants: heparin SubQ  Patient being bridged: No    Patient Active Problem List   Diagnosis     Dyslipidemia, goal LDL below 70     Persistent atrial fibrillation (H)     Typical atrial flutter (H)     Venous hypertension, chronic, bilateral     Polyneuropathy associated with underlying disease (H)     Non-insulin dependent type 2 diabetes mellitus (H)     Acquired lymphedema of lower extremity     Coronary artery disease due to lipid rich plaque     PAD (peripheral artery disease) (H)     Heart failure with preserved ejection fraction, NYHA class II (H)     MESHA (acute kidney injury) (H)     Benign essential hypertension     Wound, open, foot, left, sequela     Medically noncompliant     CAP (community acquired pneumonia)     Cellulitis     Acute conjunctivitis of left eye, unspecified acute conjunctivitis type     Allergic conjunctivitis, left     C. difficile colitis     Bilateral lower leg cellulitis     Non-STEMI (non-ST elevated myocardial infarction) (H)     Abnormal CT scan, chest     Carotid stenosis, asymptomatic, right     S/P CABG x 3    Past Medical History:   Diagnosis Date     Atrial flutter (H)      Candidiasis of perineum 1/3/2018     Coronary artery disease due to lipid rich plaque 2000    CABG x2     Diabetic ulcer of both feet (H) 10/31/2017      Dyslexia      Dyslipidemia, goal LDL below 70 2000     Essential hypertension      Gangrene of left foot (H)      Neuropathy (H)      Paroxysmal Atrial Fibrillation     Brian Moe: 8/2011 Cardioversion; CHADS2 VASC = 5; he is on warfarin and sotalol      Type 2 diabetes mellitus, without long-term current use of insulin (H)      Unable to function independently 11/13/2017        Social History   Substance Use Topics     Smoking status: Former Smoker     Packs/day: 1.00     Years: 36.00     Types: Cigarettes     Start date: 6/13/1964     Quit date: 4/30/2000     Smokeless tobacco: Never Used     Alcohol use 3.0 oz/week     2 Glasses of wine, 3 Cans of beer per week       Objective   Labs:  Last 3 days:    Recent Labs      10/08/18   0356  10/09/18   0430  10/10/18   0358   CREATININE  1.45*  1.25  1.12   HGB  8.2*  8.7*  8.7*   HCT  27.0*  27.8*  28.5*   PLT  117*  165  164     Last 7 days:   Recent labs: (last 7 days)      10/04/18   1413  10/04/18   1544  10/05/18   0504  10/08/18   1226  10/09/18   0430  10/10/18   0358   INR  1.64*  1.45*  1.48*  1.21*  1.15*  1.24*       Warfarin Dosing History:    Date INR Warfarin Dose Comment   10/8 1.21 5 mg    10/9 1.15 5 mg    10/10 1.24 5 mg            Assessment  The patient is continuing warfarin for the indication of Atrial Fibrillation with a goal INR of 2-3. INR today is subtherapeutic. Warfarin has been on hold since admission on 9/29, it was resumed 10/8.    Plan  1. Administer warfarin 5 mg PO today.  2. Check INR daily or as appropriate.  3. Continue to follow the patient's INR, PLT, and HGB as available.  4. Monitor for potential drug/disease interactions.    Thank you for the consult,  Johann Michaels, Pharmacy Student 10/10/2018 8:07 AM

## 2021-06-20 NOTE — PROGRESS NOTES
[facility]  Progress Note  Scott Briggs    Primary Care: Dr. Adams Primary Care Provider    Assessment:         Non-STEMI: The patient has ruled in by serial cardiac enzymes.  His chest pain has resolved after increasing his medical therapy.  The patient is aware that he will need coronary angiography.  Hemodynamically the patient remained stable.    Peripheral vascular disease: The patient has no change in his physical exam status and no new symptoms.    HFpEF: The patient's LVEF is 45% by echo performed today.  No evidence of volume overload on physical exam today.    CKD: The patient's BUN and creatinine have normalized.    Principal Problem:    Chest pain  Active Problems:    Dyslipidemia, goal LDL below 70    Persistent atrial fibrillation (H)    Non-insulin dependent type 2 diabetes mellitus (H)    Coronary artery disease due to lipid rich plaque    PAD (peripheral artery disease) (H)    MESHA (acute kidney injury) (H)    Benign essential hypertension    Non-STEMI (non-ST elevated myocardial infarction) (H)     LOS: 1 day       Recommendations:    We will proceed with native vessel and bypass graft coronary angiography tomorrow.  PCI if appropriate.    No change in current medical therapy today.      Subjective:  Patient notes that he feels better today.  His chest pain is fully resolved.  He notes no significant shortness of breath.  He is not having any other cardiac symptoms at this time.  The patient is tolerating his current medications well.  He is aware that coronary angiography is preferred next step in diagnostic testing and possible therapeutic intervention.    Current Facility-Administered Medications   Medication Dose Route Frequency Provider Last Rate Last Dose     acetaminophen tablet 650 mg (TYLENOL)  650 mg Oral Q4H PRN Derrick Andersen DO   650 mg at 09/29/18 2051     albuterol nebulizer solution 2.5 mg (PROVENTIL)  2.5 mg Nebulization Q6H PRN Alex Roche MD         calcium (as  carbonate) chewable tablet 400 mg (TUMS)  400 mg Oral QID PRN Derrick Andersen DO         dextrose 50 % (D50W) syringe 20-50 mL  20-50 mL Intravenous PRN Alex Roche MD         glipiZIDE (GLUCOTROL) tablet 15 mg  15 mg Oral Daily before supper Alex Roche MD   15 mg at 09/29/18 1712     glipiZIDE tablet 10 mg (GLUCOTROL)  10 mg Oral QAM AC Alex Roche MD   10 mg at 09/30/18 0901     glucagon (human recombinant) injection 1 mg  1 mg Subcutaneous PRN Alex Roche MD         heparin 25,000 units in 0.45% sodium chloride (100 units/ml) 250 mL infusion  1-40 Units/kg/hr Intravenous Continuous Derrick Andersen DO 10.9 mL/hr at 09/30/18 0132 12 Units/kg/hr at 09/30/18 0132     hydrALAZINE tablet 25 mg (APRESOLINE)  25 mg Oral Q4H PRN Derrick Andersen DO         influenza vacc high dose (age 65 or >) (PF) 2018-19 injection 0.5 mL (FLUZONE HIGH DOSE)  0.5 mL Intramuscular Prior to Discharge Karmen Petersen MD         insulin aspart U-100 injection (NovoLOG)   Subcutaneous TID with meals Derrick Andersen DO         insulin aspart U-100 injection (NovoLOG)   Subcutaneous QHS Derrick Andersen DO         isosorbide dinitrate tablet 20 mg (ISORDIL)  20 mg Oral TID dinitrates Scott Briggs MD   20 mg at 09/30/18 0901     loratadine tablet 10 mg (CLARITIN)  10 mg Oral DAILY Alex Roche MD   10 mg at 09/30/18 0901     melatonin tablet 3 mg  3 mg Oral Bedtime PRN Derrick Andersen DO   3 mg at 09/30/18 0002     metoprolol succinate 24 hr tablet 50 mg (TOPROL-XL)  50 mg Oral BID Alex Roche MD   50 mg at 09/30/18 0901     nitroglycerin SL tablet 0.4 mg (NITROSTAT)  0.4 mg Sublingual Q5 Min PRN Yarely Chambers MD         nitroglycerin SL tablet 0.4 mg (NITROSTAT)  0.4 mg Sublingual Q5 Min PRN Alex Roche MD         omeprazole capsule 20 mg (PriLOSEC)  20 mg Oral DAILY Alex Roche MD   20 mg at 09/30/18 0901     ondansetron injection 4 mg (ZOFRAN)  4 mg Intravenous Q4H PRN Derrick Andersen DO          simvastatin tablet 40 mg (ZOCOR)  40 mg Oral QHS Alex Roche MD   40 mg at 09/29/18 2047     vancomycin oral solution 125 mg (VANCOCIN)  125 mg Oral Every Other Day Derrick Andersen DO           Objective:   Vital signs in last 24 hours:  Temp:  [97.7  F (36.5  C)-98.7  F (37.1  C)] 98.5  F (36.9  C)  Heart Rate:  [60] 60  Resp:  [17-18] 17  BP: (101-135)/(60-79) 101/69  Weight:   198 lb 11.2 oz (90.1 kg)   Weight change: 11.2 oz (0.318 kg)      Physical Exam:  General: The patient is alert oriented to person place and situation.  The patient is in no acute distress at the time of my evaluation.  Eyes: Pupils are equal, round, and reactive to light.  Conjunctiva and sclera are clear.  ENT: Oral mucosa is moist and without redness. No evident nasal discharge.  Pulmonary: Lungs are clear bilaterally with no rales, rhonchi, or wheezes.    Cardiovascular exam: Rhythm is regular. S1 and S2 are normal. No significant murmur is present. JVP is normal. Lower extremities demonstrate no significant edema. Distal pulses are intact bilaterally.  Abdomen is obese, soft, and nontender.  Musculoskeletal: Spine is straight. Extremities without deformity.  Neuro: Gait is not observed as the patient is at bedrest.  Normal.   Skin is warm, dry, and otherwise intact.        Cardiographics:   Cardiac telemetry: Personally reviewed the patient demonstrates atrial flutter with controlled ventricular response.  No significant pauses.    Echocardiogram:  Indications   Acute coronary syndrome   Summary     1.Left ventricle ejection fraction is mildly decreased. The estimated left ventricular ejection fraction is 45%.    2.More pronounced inferolateral hypokinesis noted.    3.TAPSE is abnormal, which is consistent with abnormal right ventricular systolic function.    4.Mild regurgitant lesions of all 4 cardiac valves seen.    5.Mild pulmonary hypertension suggested.    6.When compared to the previous study dated 1/25/2018, overall left  ventricular ejection fraction is lower on current study, inferolateral hypokinesis appears to be a larger distribution.         Radiology:      Lab Results:   Lab Results   Component Value Date     (L) 09/30/2018    K 4.6 09/30/2018     09/30/2018    CO2 23 09/30/2018    BUN 21 09/30/2018    CREATININE 1.20 09/30/2018    CALCIUM 9.2 09/30/2018     Lab Results   Component Value Date    WBC 7.6 09/29/2018    HGB 13.4 (L) 09/29/2018    HCT 41.1 09/29/2018    MCV 86 09/29/2018     09/29/2018     Lab Results   Component Value Date    INR 1.73 (H) 09/29/2018       Outside record review:

## 2021-06-20 NOTE — PROGRESS NOTES
Duoneb given X1 Breath sounds:  Clear/diminished on right and crackles over the left lung pre and post.  SpO2 92-95% on 4L nasal cannula.  Flutter valve performed pot Tx followed by IS.  Pt performed around 900ml on IS    TOMMIE RiosT

## 2021-06-20 NOTE — PROGRESS NOTES
Duoneb given on schedule.  Breath sounds coarse pre and post.  SpO2 was 92% on 7L.  Pt was trying to cough.  I had pt use the flutter valve for a few minutes and pt was able to cough and clear secretions.  SpO2 increased to 94% on 6L..    Rocco Avery, LRT

## 2021-06-20 NOTE — ANESTHESIA PREPROCEDURE EVALUATION
Anesthesia Evaluation      Patient summary reviewed   No history of anesthetic complications     Airway   Mallampati: II  Neck ROM: full   Pulmonary    (+) pneumonia, decreased breath sounds, a smoker  (-) sleep apnea    ROS comment: Preop CTA chest with reticulonodular infiltrates, underlying chronic disease - appreciate Pulm input - not likely infectious, especially with negative procalcitonin  IMPRESSION:   CONCLUSION:  1.  Severe, shadowing, irregular atheromatous plaque in the right carotid bulb/proximal internal carotid artery. Mild left carotid bulb/proximal internal carotid artery plaque.  2.  Severe right internal carotid artery stenosis (70-99%).  3.  No significant left internal carotid artery stenosis.                            Cardiovascular   Exercise tolerance: < 4 METS  (+) hypertension, past MI, CAD, dysrhythmias, angina, CHF, , hypercholesterolemia, PVD    ECG reviewed (Aflutter)  Rhythm: irregular  Rate: normal,      ROS comment:   .Left ventricle ejection fraction is mildly decreased. The estimated left ventricular ejection fraction is 45%.    2.More pronounced inferolateral hypokinesis noted.    3.TAPSE is abnormal, which is consistent with abnormal right ventricular systolic function.    4.Mild regurgitant lesions of all 4 cardiac valves seen.    5.Mild pulmonary hypertension suggested.    6.When compared to the previous study dated 1/25/2018, overall left ventricular ejection fraction is lower on current study, inferolateral hypokinesis appears to be a larger distribution.      Estimated blood loss was <20 ml.    Prox Cx to Mid Cx lesion 70% stenosed.    Dist Cx lesion 90% stenosed.    Ost 1st Mrg to 1st Mrg lesion 100% stenosed.    Dist LAD-1 lesion 50% stenosed.    Dist LAD-2 lesion 90% stenosed.    Mid LAD lesion 70% stenosed.    Pressure wire/FFR was performed on the lesion. pre diagnositic: 0.82. post diagnostic: 0.65.    Pressure wire/FFR was performed on the lesion.    Prox RCA to Dist  RCA lesion 99% stenosed.    Dist Graft lesion 100% stenosed.    Origin to Dist Graft lesion 100% stenosed.         Neuro/Psych    (+) neuromuscular disease,      Comments: Carotid stenosis  Carotid dopplers with severe R ICA stenosis; mgmt per CV surgery  Dyslexia    Endo/Other    (+) diabetes mellitus type 2,   (-) no obesity     Comments: Lymphedema  Cellulitis lower legs  MRSA    GI/Hepatic/Renal    (+) GERD,   chronic renal disease,   (-) esophageal disease          Dental    (+) poor dentition                       Anesthesia Plan  Planned anesthetic: general endotracheal    ASA 4   Induction: intravenous     Anesthesia plan special considerations: antiemetics, arterial catheterization, pulmonary artery catheterization, IV therapy two IVs, dexmedetomidine

## 2021-06-20 NOTE — DISCHARGE SUMMARY
[facility] MEDICINE  DISCHARGE SUMMARY     Primary Care Physician: No Primary Care Provider  Admission Date: 9/29/2018   Discharge Provider: Alex Roche Discharge Date: 10/10/2018   Diet: cardiac diet Code Status: Full Code   Activity: activity as tolerated      Condition at Discharge: Stable      REASON FOR ADMISSION (See Admission Note for Details)       CP      PRINCIPAL DISCHARGE DIAGNOSIS              1. NSTEMI : s/p  LHC showed occluded vein grafts     2. Multivessel CAD : s/p redo CABG 10/4 : on baby aspirin, bb, statin.     3. intermittent hypotension requiring occasional pressor support, now weaned off.     4. Post op acute hypoxic respiratory failure : resolved.     5. Post op pulmonary congestion :  s/p iv lasix     6. Right carotid stenosis, severe : no indication for surgical intervention at this time; will need outpatient follow up in 3 months to discuss elective R CEA surgery     7. Acute metabolic encephalopathy : stable.      8. Dysphagia : on dysphagia diet.     9. Preop CTA chest with reticulonodular infiltrates, underlying chronic disease - appreciate Pulm input - not likely infectious, especially with negative procalcitonin. Outpatient f/u.     10. MESHA : Unclear etiology, may have been due to diuretics, contrast, slightly up to 1.45---1.25, improved.     11. Recent C. Diff infection : Has finished PO vanco taper     12. Afib, h/o : on Coumadin and Metoprolol.     13. DM2 : on Glipizide 10 mg daily, SSI. Stable.     14. HLD : Cont statin     15. HTN, Essential : Cont imdur, metoprolol            SIGNIFICANT FINDINGS (Imaging, labs):       Please see hospital [facility] for details        PENDING LABS       None      PROCEDURES ( this hospitalization only)        REDO STERNOTOMY, CORONARY ARTERY BYPASS X3, ENDOSCOPIC VEIN HARVEST, INTERNAL MAMMARY ARTERY, ANESTHESIA TRANSESOPHAGEAL ECHOCARDIOGRAM AND EPI AORTIC ULTRASOUND       Please see [facility] [place] for  details              RECOMMENDATION FOR F/U VISIT           Discharge Orders  If on metformin or metformin-containing products (Glucophage, Glucovance, Avandamet, Metaglip, Riomet or Fortamet, etc.) resume in 3 days     Cardiac Rehab Start Phase II   Standing Status: Future  Standing Exp. Date: 10/03/19   Order Comments: s/p cardiac surgery                 DISPOSITION ( home, home care, TCU...)       Home or Self Care        SUMMARY OF HOSPITAL COURSE:              The patient is 72yo M with CAD s/p CABG in 2000, recent C. Diff infection, Afib, DM2, PAD who presented with chest pain, found to have NSTEMI, MESHA. MESHA initially improved with IVF and holding his diuretics, however has been worsening again since LHC. Pt underwent LHC which showed occluded vein grafts and the patient is now s/p redo CABG on 10/4. Pt extubated 10/5. Had post-op dysphagia, SLP is following and is doing VFSS. Needs NDD1, nectar thick liquids. The patient did need to be paced with pacer wires, chest tube remains in place. He developed some post op encephalopathy - head CT was negative for acute process. Patient has shown significant improvement in his condition clinically at this time. Chest tubes are out and pulmonary congestion has improved.          Patient is being discharged in a stable condition to  TCU    .            Discharge Medications with Med changes:              Medication List      START taking these medications          aluminum-magnesium hydroxide-simethicone 200-200-20 mg/5 mL Susp   Commonly known as:  MAALOX ADVANCED   Take 30 mL by mouth every 4 (four) hours as needed (gastrointestinal upset).       aspirin 81 mg chewable tablet   Chew 1 tablet (81 mg total) daily.   Start taking on:  10/11/2018       bisacodyl 5 mg EC tablet   Commonly known as:  DULCOLAX   Take 2 tablets (10 mg total) by mouth daily as needed for constipation.       metoprolol tartrate 25 MG tablet   Commonly known as:  LOPRESSOR   Take 1 tablet (25 mg  total) by mouth 2 (two) times a day.       NovoLOG U-100 Insulin aspart 100 unit/mL injection   Generic drug:  insulin aspart U-100   Check blood sugar four (4) times daily. 11.9 Type 2 without complications         CHANGE how you take these medications          acetaminophen 325 MG tablet   Commonly known as:  TYLENOL   Take 2 tablets (650 mg total) by mouth every 6 (six) hours as needed for pain.   What changed:    - medication strength  - how much to take  - reasons to take this       Lactobacillus acidophilus 100 mg (1 billion cell) Cap   Take 1 capsule by mouth 2 (two) times a day.   What changed:  how much to take       warfarin 5 MG tablet   Commonly known as:  COUMADIN/JANTOVEN   Take one 5mg tablet tonight and have labs rechecked and coumadin dosed based on INR   What changed:    - how much to take  - how to take this  - when to take this  - additional instructions  - Another medication with the same name was removed. Continue taking this medication, and follow the directions you see here.         CONTINUE taking these medications          albuterol 2.5 mg /3 mL (0.083 %) nebulizer solution   Commonly known as:  PROVENTIL   Take 3 mL (2.5 mg total) by nebulization every 6 (six) hours as needed for wheezing or shortness of breath.       cetirizine 10 MG tablet   Commonly known as:  ZyrTEC   Take 10 mg by mouth daily.       * furosemide 40 MG tablet   Commonly known as:  LASIX   Take 40 mg by mouth daily.       * furosemide 20 MG tablet   Commonly known as:  LASIX   Take 20 mg by mouth daily as needed. If weight increases 2 lbs in 24 hours.       gentamicin 0.1 % ointment   Commonly known as:  GARAMYCIN   Apply 1 application topically daily as needed.       * glipiZIDE 10 MG tablet   Commonly known as:  GLUCOTROL   Take 10 mg by mouth daily before breakfast.       * glipiZIDE 10 MG tablet   Commonly known as:  GLUCOTROL   Take 15 mg by mouth every evening.       hydrocortisone 1 % cream   Apply 1 application  topically 2 (two) times a day as needed.       loratadine 10 mg tablet   Commonly known as:  CLARITIN   Take 10 mg by mouth daily.       * miconazole 2 % powder   Commonly known as:  MICOTIN   Apply 1 application topically daily as needed for itching.       * miconazole 2 % cream   Commonly known as:  SECURA EXTRA THICK   Apply 1 application topically 2 (two) times a day as needed.       min oil-petrolat ointment   Commonly known as:  AQUAPHOR   Apply 1 application topically 3 (three) times a day as needed.       nitroglycerin 0.4 MG SL tablet   Commonly known as:  NITROSTAT   Place 0.4 mg under the tongue every 5 (five) minutes as needed for chest pain.       omeprazole 20 MG capsule   Commonly known as:  PriLOSEC   Take 20 mg by mouth daily.       simvastatin 40 MG tablet   Commonly known as:  ZOCOR   Take 40 mg by mouth at bedtime.       spironolactone 25 MG tablet   Commonly known as:  ALDACTONE   Take 25 mg by mouth daily.       triamcinolone 0.1 % cream   Commonly known as:  KENALOG   Apply to lower legs and hands two times a day for 10 days only - not to face       urea 10 % lotion   Apply 1 application topically daily as needed.       VUSION 0.25-15-81.35 % Oint   Generic drug:  miconazole nitrate-zinc ox-pet   Apply 1 application topically 2 (two) times a day as needed.       * Notice:  This list has 6 medication(s) that are the same as other medications prescribed for you. Read the directions carefully, and ask your doctor or other care provider to review them with you.      STOP taking these medications          lisinopril 5 MG tablet   Commonly known as:  PRINIVIL,ZESTRIL       metoprolol succinate 50 MG 24 hr tablet   Commonly known as:  TOPROL-XL       vancomycin 125 MG capsule   Commonly known as:  VANCOCIN                   Rationale for medication changes:        Medications Stopped :  None    Medication Dose Changes :  None    Medications on Hold :   None    New Medications :    None              Problem list from this hospital stay:     Principal Problem:    Non-STEMI (non-ST elevated myocardial infarction) (H)  Active Problems:    Dyslipidemia, goal LDL below 70    Persistent atrial fibrillation (H)    Non-insulin dependent type 2 diabetes mellitus (H)    Coronary artery disease due to lipid rich plaque    PAD (peripheral artery disease) (H)    MESHA (acute kidney injury) (H)    Benign essential hypertension    C. difficile colitis    Abnormal CT scan, chest    Carotid stenosis, asymptomatic, right    S/P CABG x 3              Consult/s:  cardiology          Discharge Instructions:  Additional discharge instructions:       Discharge Orders  If on metformin or metformin-containing products (Glucophage, Glucovance, Avandamet, Metaglip, Riomet or Fortamet, etc.) resume in 3 days     Cardiac Rehab Start Phase II   Standing Status: Future  Standing Exp. Date: 10/03/19   Order Comments: s/p cardiac surgery           Subjective                Examination         Vital Signs in last 24 hours:   Temp:  [98.1  F (36.7  C)-98.6  F (37  C)] 98.3  F (36.8  C)  Heart Rate:  [59-66] 61  Resp:  [16-24] 17  BP: (111-125)/(55-62) 113/55  SpO2:  [84 %-97 %] 94 %      HEENT : no distended veins, no lymphadenopathy, thyroid is normal  LUNGS : b/l air entry present, no significant crackles/wheezing.  ABDOMEN : soft, non-tender, non-distended, BS present.  HEART :  Regular rate & rhythm, S1 & S2 normal, no murmur, clicks/rubs, no ankle edema,  NEURO : conscious, oriented, responds to commands, no obvious focal deficit.  MUSCULOSKELETAL : EXTREMITIES :  no calf tenderness.  SKIN : no rashes  BACK : wnl      Please see EMR for more detailed significant labs, imaging, consultant notes etc.  Total time spent on discharge: > 35  minutes    Alex Oreilly MD   Pager #:   CC:No Primary Care Provider            DR. ALEX OREILLY    HOSPITALIST, [facility]  [facility]

## 2021-06-20 NOTE — PROGRESS NOTES
Progress Note        BRIEF HOSPITAL COURSE :         The patient is 74yo M with CAD s/p CABG in 2000, recent C. Diff infection, Afib, DM2, PAD who presented with chest pain, found to have NSTEMI, MESHA. MESHA initially improved with IVF and holding his diuretics, however has been worsening again since LHC. Pt underwent LHC which showed occluded vein grafts and the patient is now s/p redo CABG on 10/4. Pt extubated 10/5. Had post-op dysphagia, SLP is following and is doing VFSS. Needs NDD1, nectar thick liquids. The patient did need to be paced with pacer wires, chest tube remains in place. He developed some post op encephalopathy - head CT was negative for acute process. He is slowly clearing.  CT is still in place and draining.  There is also concerns for possible air leak  At the time of my evaluation, patient is sitting up in a chair he offers no complaints.  Patient's sister who visited yesterday has expressed concerns about patient's cognitive issues and concerns of hoarding.         SUBJECTIVE :     Chest tubes out today  Resting comfortably in bed  No fever  No cp    PLAN :       - continue current cardiac meds, wean off oxygen, pt/ot    - not yet medically ready for discharge.          Barriers to Discharge : chest tubes out today, on oxygen  Anticipated discharge : 1-2 days  Disposition : tcu      TIME SPENT : Total time spent > 35 minutes and > 50% time spent on counseling patient about plan of care and coordination of care.            ASSESSME NT :              1. NSTEMI : s/p  LHC showed occluded vein grafts    2. Multivessel CAD : s/p redo CABG 10/4 : on baby aspirin, bb, statin.    3. intermittent hypotension requiring occasional pressor support, now weaned off    4. Post op pulmonary congestion : s/p iv lasix, resolved.    5. Right carotid stenosis, severe : no indication for surgical intervention at this time; will need outpatient follow up in 3 months to discuss elective R CEA surgery    6. Acute  metabolic encephalopathy : stable.     7. Dysphagia : on dysphagia diet.    8. Preop CTA chest with reticulonodular infiltrates, underlying chronic disease - appreciate Pulm input - not likely infectious, especially with negative procalcitonin. Outpatient f/u.    9. MESHA : Unclear etiology, may have been due to diuretics, contrast, slightly up to 1.45.    10. Recent C. Diff infection : Has finished PO vanco taper    11. Afib, h/o  Warfarin on hold; will need to confirm timing of resumption with CV Surgery Cont metoprolol    12. DM2 : on Glipizide 10 mg daily, SSI. Stable.    13. HLD : Cont statin    14. HTN, Essential : Cont imdur, metoprolol               Diet : dysphagia diet    , IV fluids :     , IV Meds :    , Antibiotics     QTc :  DVT Prophylaxis : heparin  Code Status : Full  Admit status : inpatient  Morning labs ordered :               Objective :            Review of Systems   A 12 point comprehensive review of systems was negative except as noted.        Physical Exam    HEENT : no distended veins, no lymphadenopathy, thyroid is normal  LUNGS : b/l air entry present, no significant crackles/wheezing.  ABDOMEN : soft, non-tender, non-distended, BS present.  HEART :  Regular rate & rhythm, S1 & S2 normal, no murmur, clicks/rubs, no ankle edema  NEURO : conscious, oriented, responds to commands, no obvious focal deficit.  MUSCULOSKELETAL / EXTREMITIES :  no calf tenderness.  SKIN : no rashes  BACK : wnl        Pertinent Labs   Lab Results: personally reviewed.   Lab Results   Component Value Date     10/08/2018    K 4.1 10/08/2018     (H) 10/08/2018    CO2 24 10/08/2018    BUN 33 (H) 10/08/2018    CREATININE 1.45 (H) 10/08/2018    CALCIUM 9.6 10/08/2018           Pertinent Radiology   Radiology Results: Personally reviewed image/s  EKG Results: not done              DR. ANA OREILLY  [facility] Hospital[facility]

## 2021-06-20 NOTE — ANESTHESIA PROCEDURE NOTES
Arterial Line  Reason for Procedure: hemodynamic monitoring and multiple ABGs  Patient location during procedure: OR pre-induction  Start time: 10/4/2018 8:13 AM  End time: 10/4/2018 8:17 AM  Staffing:  Performing  Anesthesiologist: YUE COLUNGA  Sterile Precautions:  sterile barriers used during insertion: cap, mask, sterile gloves, large sheet, and hand hygiene used.  Arterial Line:   Immediately prior to procedure a time out was called to verify the correct patient, procedure, equipment, support staff and site/side marked as required  Laterality: right  Location: brachial  Prepped with: ChloroPrep    Needle gauge: 20 G  Number of Attempts: 1  Secured with: tape, transparent dressing and pressure dressing  Flushed with: saline  1% lidocaine local anesthesia used for skin prep.   See MAR for additional medications given.

## 2021-06-20 NOTE — PROGRESS NOTES
Patient assisted with IS with effort achieving 600 x 10 breaths.  Pt used aerobika with good effort and non productive cough.  Pt on 3 lpm nc with spo2 93 %  Will continue to monitor and treat as ordered.

## 2021-06-20 NOTE — PROGRESS NOTES
S/P redo CABG. No neuro deficits on awakening. Still has a long convalescence ahead of him. I will arrange for a clinic visit in 3 months to plan for right carotid endarterectomy on an elective basis.

## 2021-06-20 NOTE — PROGRESS NOTES
Pt arrived to the ICU at 1535 intubated with 7.5 ETT secured at 20 cm at the teeth.  Pt is on the ventilator settings below and tolerating well.BS clear on the right and coarse on the left. Duoneb given x1. ABG drawn x1. Results are within normal ranges. Plan is wean and extubate when appropriate. RT following.    Vent Mode: VCV  FiO2 (%):  [50 %-100 %] 50 %  S RR:  [16] 16  S VT:  [600 mL] 600 mL  PEEP/CPAP (cm H2O):  [5 cm H2O] 5 cm H2O  Minute Ventilation (L/min):  [9.5 L/min-11.6 L/min] 9.5 L/min  PIP:  [29 cm H2O-36 cm H2O] 36 cm H2O  MAP (cm H2O):  [9-12] 12    TOMMIE DanielsT

## 2021-06-20 NOTE — PROGRESS NOTES
Pharmacy Consult: Warfarin Management    Pharmacy consulted on 9/29/18 to dose warfarin for Ever Crane, a 73 y.o. male    Ordering provider: Alex Roche MD, [facility] hospitalist    Reason for warfarin therapy: Atrial Fibrillation  Goal INR Range: 2-3    Subjective  Medication lists (home and hospital) were reviewed.    New medications that may increase bleeding risk/INR: Heparin infusion    Restarted home medications that may increase bleeding risk/INR: glipizide, omeprazole    Home meds not restarted that may increase bleeding risk/INR: spironolactone    Home Warfarin Dosing: Warfarin 5 mg MWF; warfarin 4 mg Tues, Thurs, Sat and Sun    Other Anticoagulants: Heparin infusion-low intensity  Patient being bridged: Yes    Patient Active Problem List   Diagnosis     Dyslipidemia, goal LDL below 70     Persistent atrial fibrillation (H)     Atrial flutter (H)     Venous hypertension, chronic, bilateral     Polyneuropathy associated with underlying disease (H)     Non-insulin dependent type 2 diabetes mellitus (H)     Acquired lymphedema of lower extremity     Coronary artery disease due to lipid rich plaque     PAD (peripheral artery disease) (H)     Heart failure with preserved ejection fraction (H)     Benign essential hypertension     Wound, open, foot, left, sequela     Medically noncompliant     CAP (community acquired pneumonia)     Cellulitis     Acute conjunctivitis of left eye, unspecified acute conjunctivitis type     Allergic conjunctivitis, left     C. difficile diarrhea     Bilateral lower leg cellulitis     Chest pain    Past Medical History:   Diagnosis Date     Atrial flutter (H)      Candidiasis of perineum 1/3/2018     Coronary artery disease due to lipid rich plaque 2000    CABG x2     Diabetic ulcer of both feet (H) 10/31/2017     Dyslexia      Dyslipidemia, goal LDL below 70 2000     Essential hypertension      Gangrene of left foot (H)      Neuropathy (H)      Paroxysmal Atrial Fibrillation      Brian Moe: 8/2011 Cardioversion; CHADS2 VASC = 5; he is on warfarin and sotalol      Type 2 diabetes mellitus, without long-term current use of insulin (H)      Unable to function independently 11/13/2017        Social History   Substance Use Topics     Smoking status: Former Smoker     Packs/day: 1.00     Years: 36.00     Types: Cigarettes     Start date: 6/13/1964     Quit date: 4/30/2000     Smokeless tobacco: Never Used     Alcohol use 3.0 oz/week     2 Glasses of wine, 3 Cans of beer per week       Objective   Labs:  Last 3 days:    Recent Labs      09/29/18   0059  09/29/18   0801  09/29/18   0906   CREATININE  1.57*  1.42*   --    HGB  12.9*   --   13.4*   HCT  40.1   --   41.1   PLT  139*   --   144   BILITOT  1.0   --    --    AST  17   --    --    ALT  12   --    --    ALKPHOS  103   --    --      Last 7 days:   Recent labs: (last 7 days)      09/25/18   1026  09/29/18   0059  09/29/18   0906   INR  2.51*  1.62*  1.73*       Warfarin Dosing History:    Date INR Warfarin Dose Comment   9/29/18  1.73 5 mg                        Assessment  The patient is continuing warfarin for the indication of Atrial Fibrillation with a goal INR of 2-3. INR today is subtherapeutic. Because INR is below INR range, will give larger dose this evening than pt home regimen    Plan  1. Administer warfarin 5 mg PO today.  2. Check INR daily or as appropriate.  3. Continue to follow the patient's INR, PLT, and HGB as available.  4. Monitor for potential drug/disease interactions.    Thank you for the consult,  Mita Pardo, PharmD 9/29/2018 10:28 AM

## 2021-06-20 NOTE — PROGRESS NOTES
Patient's consents for an coronary angiogram was signed. Talked to patient about the procedure he will be having to thomas.  Patient refused to watch the angio video. Patient also  refused a shower and said he will like to have a shower tomorrow.  Patient is aware that he is going to be NPO after midnight. Please do reinforce for patient seem a little confused and forgetful.

## 2021-06-20 NOTE — PROGRESS NOTES
Pharmacy Note - Admission Medication History    Pertinent Provider Information: Noted patient discharged from [facility] on Vancomycin po taper that was to run until 10/13/18.  Medication list sent with patient from [facility] does not have any Vnacomycin po on it so I called [facility].  They do not have a nurse on site on weekends so they gave me phone number for Bree([phone]) on call RN.  Bree is at home and does not have access to computer to be able to tell me why vanco po not on med list.  She will go to [facility] some time today and call back with information on when pt last got po vanco there/why med is not on medication list.     ______________________________________________________________________    Prior To Admission (PTA) med list completed and updated in EMR.       PTA Med List   Medication Sig Note Last Dose     acetaminophen (TYLENOL) 500 MG tablet Take 2 tablets (1,000 mg total) by mouth every 6 (six) hours as needed.  Past Month at Unknown time     cetirizine (ZYRTEC) 10 MG tablet Take 10 mg by mouth daily.  9/28/2018 at 0740     furosemide (LASIX) 20 MG tablet Take 20 mg by mouth daily as needed. If weight increases 2 lbs in 24 hours.  Unknown at Unknown time     furosemide (LASIX) 40 MG tablet Take 40 mg by mouth daily.  9/28/2018 at 0740     gentamicin (GARAMYCIN) 0.1 % ointment Apply 1 application topically daily as needed.  Unknown at Unknown time     glipiZIDE (GLUCOTROL) 10 MG tablet Take 10 mg by mouth daily before breakfast.  9/28/2018 at 0740     glipiZIDE (GLUCOTROL) 10 MG tablet Take 15 mg by mouth every evening.  9/28/2018 at 1608     hydrocortisone 1 % cream Apply 1 application topically 2 (two) times a day as needed.  Unknown at Unknown time     lisinopril (PRINIVIL,ZESTRIL) 5 MG tablet Take 5 mg by mouth daily.  9/28/2018 at 0740     loratadine (CLARITIN) 10 mg tablet Take 10 mg by mouth daily.  9/28/2018 at 0740     metoprolol succinate (TOPROL-XL) 50 MG 24  hr tablet Take 50 mg by mouth 2 (two) times a day.   9/28/2018 at 2007     miconazole (MICOTIN) 2 % powder Apply 1 application topically daily as needed for itching.  Unknown at Unknown time     miconazole (SECURA EXTRA THICK) 2 % cream Apply 1 application topically 2 (two) times a day as needed.  Unknown at Unknown time     miconazole nitrate-zinc ox-pet (VUSION) 0.25-15-81.35 % Oint Apply 1 application topically 2 (two) times a day as needed.  Unknown at Unknown time     min oil-petrolat (AQUAPHOR) ointment Apply 1 application topically 3 (three) times a day as needed.  Unknown at Unknown time     nitroglycerin (NITROSTAT) 0.4 MG SL tablet Place 0.4 mg under the tongue every 5 (five) minutes as needed for chest pain.   Unknown at Unknown time     omeprazole (PRILOSEC) 20 MG capsule Take 20 mg by mouth daily.   9/28/2018 at 0740     simvastatin (ZOCOR) 40 MG tablet Take 40 mg by mouth at bedtime.   9/28/2018 at 2007     spironolactone (ALDACTONE) 25 MG tablet Take 25 mg by mouth daily.  9/28/2018 at 0807     triamcinolone (KENALOG) 0.1 % cream Apply to lower legs and hands two times a day for 10 days only - not to face  9/28/2018 at 2007     urea 10 % lotion Apply 1 application topically daily as needed.  Unknown at Unknown time     vancomycin (VANCOCIN) 125 MG capsule Take 1 tab PO QID for 11 days, then 1 tab BID x7 days, then 1 tab daily x7 days, then 1 tab QOD x14 days then stop. 9/29/2018: 9/29/18 should be day #1 of Vancomycin 125 mg every other day x 14 days( last day should be 10/13) Unknown at Unknown time     warfarin (COUMADIN/JANTOVEN) 4 MG tablet Take 4 mg by mouth 4 (four) times a week. warfarin 4 mg q Tues, Thurs, Sat, Sun(Warfarin 5 mg q Mon, Wed, Fri)  Unknown at Unknown time     warfarin (COUMADIN/JANTOVEN) 5 MG tablet Take 5 mg by mouth 3 (three) times a week. Warfarin 5 mg q Mon, Wed, Fri(warfarin 4 mg q Tues, Thurs, Sat, Sun)  9/28/2018 at 1608       Information source(s): Facility ([facility]  medication list/MAR    Summary of Changes to PTA Med List  New: furosemide 40 mg daily scheduled, simvastatin 40 mg q hs, spironolactone 25 mg daily, desenex powder, gentamicin 1 % oint, hydrocortisone 1 % cream, miconazole cream, urea 10 % lotion, vusion ointment  Discontinued: none  Changed: warfarin 5 mg MWF; warfarin 4 mg other 4 days of weelk(was 4 mg daily)    Patient was asked about OTC/herbal products specifically.  PTA med list reflects this.    Based on the pharmacist s assessment, the PTA med list information appears somewhat reliable:due to records unavailable    Patient appears adherent: Yes    Allergies were reviewed, assessed, and updated with the patient.      Patient did not bring any medications to the hospital and can't retrieve from home. No multi-dose medications are available for use during hospital stay.     Thank you for the opportunity to participate in the care of this patient.    Mita Pardo, PharmD  9/29/2018 8:53 AM

## 2021-06-20 NOTE — PROGRESS NOTES
Speech Language/Pathology  Speech Therapy Daily Progress Note    Patient presents as alert and cooperative during this session.  An  was not applicable.   MD requested re-assessment of swallow at bedside given improved alertness this morning compared to 10/5/18.    Objective  Rough vocal quality noted at rest prior to and during to oral intake. NOTE: Desaturation and mild change in vocal quality necessitating multiple throat clears with mild change to upright, sitting position in chair.     Swallow:  - Honey: x3 bites via spoon. Pt had persistent delayed, weak, ineffective coughing. Swallow appeared mildly delayed and suspect reduced hyolaryngeal movement. Pt able to demonstrate strong, productive throat clearing with verbal cues from therapist x2.   - Thin: 5 ounces via straw. Swallow appeared mildly delayed and suspect reduced hyolaryngeal movement, but no overt s/s aspiration noted. Patient trialed 1.5 ounces via cup and had mild change in vocal quality, burping, and significantly delayed throat clearing inconsistently.    - Puree: x2 bites with applesauce only and then took medication - x4 bites w/1 pill in each bite. Suspect delayed initiation and reduced hyolaryngeal movement. Noted mild change in vocal quality inconsistently across trials and x1 significantly Significantly delayed throat clear.       Assessment  Continues to demonstrate inconsistent s/s pharyngeal dysphagia across consistencies. Minimal improvement in swallow compared to initial evaluation on 10/5.  Mild intermittent confusion and disorientation persist, but pt is overall cooperative and interactive with therapist during repeat bedside evaluation. Concern for silent aspiration and recommend further assessment via video swallow study prior to initiation of oral diet.     Plan/Recommendations  VFSS to further assess pharyngeal swallow physiology given inconsistent s/s dysphagia, rule-out silent aspiration and determine LRD.     The  ST Care Plan has been reviewed and current plan remains appropriate.    30 dysphagia minutes     Sherron Tovar MA, CCC-SLP

## 2021-06-20 NOTE — H&P
Admission History and Physical   Ever Crane,  1945, MRN [id number]    PCP: No Primary Care Provider, None    Date of Service   Code status:  Full Code       Extended Emergency Contact Information  Primary Emergency Contact: Miriam Riggs  Address: [address] [place] [address] [facility]  Home Phone: [phone]  Mobile Phone: [phone]  Relation: Sibling  Secondary Emergency Contact: Marie Tobias  Address: [address] [place] [address] [facility]  Home Phone: [phone]  Mobile Phone: [phone]  Relation: Sibling       Assessment and Plan              1. CP : r/o ACS. Consult Cardiology. Normal D dimer.    2. CAD s/p CABG double  : on bb, statin.    3. Chronic swelling in legs : on lasix prn    4. Renal failure, acute : related to dehydration, iv fluids, hold lisinopril. Monitor.    5. C diff infection 2018 : on oral vancomycin - last dose should be today or tomorrow. Pharmacy team needs to confirm.    6. DM2 : on glipizide, SSI.    7. HTN, Essential : on lisinopril, metoprolol, Hydralazine prn.    8. HLD : on statin    9. A fib, h/o : on Coumadin, bb.    10. Anemia, chronic : stable.            Diet :  Cardiac - diabetic        IV Fluids :  75/hr   IV Medications :   Antibiotics :  DVT Prophylaxis : SCD's  Admit status : inpatient      Disposition/Plan :      Patient is a 73-year-old Male with history of CAD s/p CABG double . He was admitted with acute,  chest pain.   EKG did not reveal any ischemic changes and troponin was negative. Will rule out acute coronary syndrome with 2 more troponin q 6 hr. D-dimer was normal. Will consult cardio.         Admit under observation         CODE STATUS DISCUSSED  : Patient has opted for  full   Code status.           ANAPHYLAXIS  TO ANY DRUGS :   Latex, penicillins                       REVIEWED :  yes            Chief Complaint:      History of Present Illness         Patient is a    73/M     who has been admitted to [facility] on  09/29/18.      history of CAD s/p CABG double 2000.     He was admitted with acute,  chest pain.  The pain started last evening after he ate a bunch of  Sausage. The pain is hard to describe but feels  pressure like, is non radiating with no particular aggravating factors. It is a constant pain although it did  went away for a while  But came back again. It is around 5/10 in intensity. Movement makes it worse.     No h/o fever or chills.  No shortness of breath, cough or phlegm.  No abdominal symptoms  No urinary symptoms  No neuro symptoms.     Subsequently, patient came to the ER for further evaluation and management.      EKG did not reveal any ischemic changes and troponin was negative.     Will rule out acute coronary syndrome with 2 more troponin q 6 hr.     D-dimer was normal.     He has been admitted for further evaluation and management.            Medical History  Active Ambulatory (Non-Hospital) Problems    Diagnosis     Bilateral lower leg cellulitis     Allergic conjunctivitis, left     C. difficile diarrhea     Acute conjunctivitis of left eye, unspecified acute conjunctivitis type     Cellulitis     CAP (community acquired pneumonia)     Wound, open, foot, left, sequela     Medically noncompliant     Benign essential hypertension     Heart failure with preserved ejection fraction (H)     PAD (peripheral artery disease) (H)     Coronary artery disease due to lipid rich plaque     Acquired lymphedema of lower extremity     Non-insulin dependent type 2 diabetes mellitus (H)     Venous hypertension, chronic, bilateral     Polyneuropathy associated with underlying disease (H)     Dyslipidemia, goal LDL below 70     Persistent atrial fibrillation (H)     Atrial flutter (H)     Past Medical History:   Diagnosis Date     Atrial flutter (H)      Candidiasis of perineum 1/3/2018     Coronary artery disease due to lipid rich plaque 2000     Diabetic ulcer  of both feet (H) 10/31/2017     Dyslexia      Dyslipidemia, goal LDL below 70 2000     Essential hypertension      Gangrene of left foot (H)      Neuropathy (H)      Paroxysmal Atrial Fibrillation      Type 2 diabetes mellitus, without long-term current use of insulin (H)      Unable to function independently 11/13/2017        Surgical History  He  has a past surgical history that includes Coronary artery bypass graft (3/6/2000); Cardioversion (8/25/2011); Foot Amputation (Left, 11/5/2017); and Inguinal hernia repair (Bilateral, 1969 and 1979).       Social History  Reviewed, and he  reports that he quit smoking about 18 years ago. His smoking use included Cigarettes. He started smoking about 54 years ago. He has a 36.00 pack-year smoking history. He has never used smokeless tobacco. He reports that he drinks about 3.0 oz of alcohol per week  He reports that he does not use illicit drugs.  Social Situation :  Marital Status :  Children :   Smoker :  Alcohol Intake :  Drugs of Abuse :  Functional Status :  Working :  ALLERGIES/ANAPHYLAXIS  PAST HOSPITALIZATION  PAST SURGICAL HISTORY     Allergies  Allergies   Allergen Reactions     Latex      Penicillins Rash     Childhood rxn    Family History  Reviewed, and family history includes CABG in his father; Esophageal cancer (age of onset: 58) in his father; No Medical Problems in his grandchild, grandchild, and son; Sudden death (age of onset: 85) in his mother; Valvular heart disease in his father.          Prior to Admission Medications   Prescriptions Prior to Admission   Medication Sig Dispense Refill Last Dose     acetaminophen (TYLENOL) 500 MG tablet Take 2 tablets (1,000 mg total) by mouth every 6 (six) hours as needed.  0 Past Month at Unknown time     albuterol (PROVENTIL) 2.5 mg /3 mL (0.083 %) nebulizer solution Take 3 mL (2.5 mg total) by nebulization every 6 (six) hours as needed for wheezing or shortness of breath.  0 Unknown at Unknown time     furosemide  (LASIX) 20 MG tablet Take 20 mg by mouth daily as needed. If weight increases 2 lbs in 24 hours.   Unknown at Unknown time     glipiZIDE (GLUCOTROL) 10 MG tablet Take 10 mg by mouth daily before breakfast.   8/31/2018 at 0900     glipiZIDE (GLUCOTROL) 10 MG tablet Take 15 mg by mouth every evening.   8/30/2018 at 1600     Lactobacillus acidophilus 100 mg (1 billion cell) cap Take 1 capsule by mouth 2 (two) times a day. (Patient taking differently: Take 2 capsules by mouth 2 (two) times a day. ) 60 capsule 0 8/31/2018 at 0900     lisinopril (PRINIVIL,ZESTRIL) 5 MG tablet Take 5 mg by mouth daily.   8/31/2018 at 0900     loratadine (CLARITIN) 10 mg tablet Take 10 mg by mouth daily.   8/31/2018 at 0900     metoprolol succinate (TOPROL-XL) 50 MG 24 hr tablet Take 50 mg by mouth 2 (two) times a day.    8/31/2018 at 0900     min oil-petrolat (AQUAPHOR) ointment Apply 1 application topically 3 (three) times a day as needed. 396 g 11 Unknown at Unknown time     nitroglycerin (NITROSTAT) 0.4 MG SL tablet Place 0.4 mg under the tongue every 5 (five) minutes as needed for chest pain.    Unknown at Unknown time     omeprazole (PRILOSEC) 20 MG capsule Take 20 mg by mouth daily.    8/31/2018 at 0900     simvastatin (ZOCOR) 40 MG tablet Take 40 mg by mouth at bedtime.    8/30/2018 at 2000     triamcinolone (KENALOG) 0.1 % cream Apply to lower legs and hands two times a day for 10 days only - not to face 30 g 0      vancomycin (VANCOCIN) 125 MG capsule Take 1 tab PO QID for 11 days, then 1 tab BID x7 days, then 1 tab daily x7 days, then 1 tab QOD x14 days then stop. 72 capsule 0 8/31/2018 at 0900, day 4 of BID     warfarin (COUMADIN) 4 MG tablet Take 4 mg by mouth daily. Recheck INR 9/6   8/30/2018 at 1700          Review of Systems:  A 12 point comprehensive review of systems was negative except as noted. Physical Exam:      HEENT : no distended veins, no lymphadenopathy, thyroid is normal  LUNGS : b/l air entry present, no  significant crackles/wheezing.  ABDOMEN : soft, non-tender, non-distended, BS present.  HEART :  Regular rate & rhythm, S1 & S2 normal, no murmur, clicks/rubs, no ankle edema,  NEURO : conscious, oriented, responds to commands, no obvious focal deficit.  MUSCULOSKELETAL : EXTREMITIES :  no calf tenderness.  SKIN : no rashes  BACK : wnl               Pertinent Labs      Lab Results: personally reviewed.   Lab Results   Component Value Date     09/29/2018    K 4.9 09/29/2018     09/29/2018    CO2 25 09/29/2018    BUN 25 09/29/2018    CREATININE 1.42 (H) 09/29/2018    CALCIUM 9.6 09/29/2018       Pertinent Radiology      Radiology Results: Personally reviewed image/s  EKG Results:    Advanced Care Planning      Discharge Planning discussed with Patient and Family  Discussed care with Patient and Family  for 60 minutes with greater than 50% of time spent in counseling and coordination of care.  Total Time Spent > 70 mins.        Dr. Alex Roche    [facility]

## 2021-06-20 NOTE — CONSULTS
Pulmonary Consult Note  Date of Service: 10/2/2018    Reason for Consultation: Abnormal CT chest    History:     HPI: Patient is a pleasant 73-year-old male with past medical history significant for CAD status post CABG in 2000, history of atrial flutter, hypertension, hyperlipidemia, paroxysmal atrial fibrillation, diabetes mellitus type 2 who was admitted with chest pain.  Patient was found to have NSTEMI.  He underwent left heart cath which showed occluded vein grafts.  CT chest was done and showed scattered reticulonodular infiltrates. Pt denies any complaints. No cough, URI, fever or chills. No recent travel. He quit smoking back in 2000.  Denies night sweats or weight loss. Worked as a  but has been unemployed since his left foot amputation last novemeber.    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 Cardioversion; CHADS2 VASC = 5; he is on warfarin and sotalol      Type 2 diabetes mellitus, without long-term current use of insulin (H)      Unable to function independently 11/13/2017     Past Surgical History:   Procedure Laterality Date     CARDIOVERSION  8/25/2011     CORONARY ARTERY BYPASS GRAFT  3/6/2000    SVG to OM1, SVG to PDA     FOOT AMPUTATION Left 11/5/2017    Procedure: LEFT TRANSMETATARSAL AMPUTATION;  Surgeon: Ever Wick MD;  [place] [facility]:      INGUINAL HERNIA REPAIR Bilateral 1969 and 1979     Social History     Social History     Marital status:      Spouse name: N/A     Number of children: 1     Years of education: N/A     Occupational History      Self Employed      "he states he is still working despite being in TCU in 2018     Social History Main Topics     Smoking status: Former Smoker     Packs/day: 1.00     Years: 36.00     Types: Cigarettes     Start date: 6/13/1964     Quit date: 4/30/2000     Smokeless tobacco: Never Used     Alcohol use 3.0 oz/week     2 Glasses of wine, 3 Cans of beer per week     Drug use: No     Sexual activity: Not Currently     Partners: Female     Other Topics Concern     Not on file     Social History Narrative    Ever lived with his son Raciel in [place], 2017, but then he went to [facility] after foot amputation.  Ever states he will move to a facility in [place] in June, 2018. The [facility] papers say he uses the \"blue ride\" but Ever states he has his own vehicle on the campus and is still doing plumbing work in the spring 2018.  Our reception staff at [facility] in [place] confirmed on June 13, 2018 that Ever brought himself to the campus and did not utilize a family member or ride service.  I would also note that he states his granddaughter is name Claire and not Leonela, so I corrected that in the chart (ROSALIA Moe MD).           Review of Systems - 10 point review of system negative except for what is mentioned in the HPI.    Exam/Data:   /68 (Patient Position: Lying)  Pulse 77  Temp 97.8  F (36.6  C) (Oral)   Resp 18  Ht 6' (1.829 m)  Wt 195 lb 3.2 oz (88.5 kg)  SpO2 95%  BMI 26.47 kg/m2    EXAM:  GEN: comfortable, NAD  HEENT: NCAT, EMOI, mmm  LN: no cervical LAD   CVS: S1S2, RRR  Lung: good air entry bilaterally  Abd: soft, nt, + BS. No masses  Ext: no c/c/e  Neuro: nonfocal  Skin: no visible rash  Musculoskeletal: FROM all extremities    DATA    IMAGING:   Xr Chest 1 View Portable    Result Date: 9/29/2018  XR CHEST 1 VIEW PORTABLE 9/29/2018 12:29 AM INDICATION: Chest pain COMPARISON: None. FINDINGS: Cardiomegaly. Pulmonary vascularity normal. Prior median sternotomy and CABG. The lungs are clear.    Ct " Chest With Contrast    Result Date: 10/1/2018  CT CHEST W CONTRAST 10/1/2018 4:24 PM INDICATION: LIMA evaluation previous sternotomy, preoperative planning for CABG. TECHNIQUE: Routine chest. Dose reduction techniques were used. IV CONTRAST: Iohexol (Omni) 75 mL. COMPARISON: 01/25/2018. FINDINGS: LUNGS AND PLEURA: Scattered areas of reticulonodular infiltrate and airspace disease along with less prominent interstitial edema with chronic areas of peripheral interstitial disease with peripheral lung destruction and scattered areas of fibroatelectasis. Resolution of pleural effusions on prior. MEDIASTINUM: Left internal mammary artery unremarkable and patent as is the right internal mammary artery. Prior CABG. Coronary artery calcification. No mass or adenopathy. LIMITED UPPER ABDOMEN: Stable upper pole left renal cyst. Hepatic steatosis. MUSCULOSKELETAL: Degenerative disease.     CONCLUSION: 1.  New scattered areas of reticulonodular infiltrate likely infectious or inflammatory. Underlying chronic lung disease as on prior although partially obscured on prior due to the pulmonary edema pattern on the previous study. Pulmonary consult would be  helpful. Follow-up recommended given multiple tiny nodules. 2.  Left and right internal mammary arteries patent and unremarkable. 3.  Hepatic steatosis.    Us Carotid Bilateral    Result Date: 10/1/2018  US CAROTID BILATERAL 10/1/2018 5:15 PM INDICATION: Redo CABG. TECHNIQUE: Duplex exam performed utilizing 2D gray-scale imaging, Doppler interrogation with color-flow and spectral waveform analysis. COMPARISON: None. FINDINGS: RIGHT: There is severe shadowing, irregular atheromatous plaque in the carotid bulb/proximal internal carotid artery. Severe right internal carotid artery stenosis with spectral broadening and delayed systolic upstroke. LEFT: There is mild atheromatous plaque in the carotid bulb/proximal internal carotid artery. Normal waveforms with no significant stenosis.  Both vertebral arteries and subclavian artery waveforms are normal. VELOCITY CHART: The following velocities were obtained in the RIGHT carotid system. CCA: 54/9 cm/s ICA: 299/115 cm/s ECA: 149/14 cm/s ICA/CCA: PS 5.53 The following velocities were obtained in the LEFT carotid system. CCA: 48/9 cm/s ICA: 103/35 cm/s ECA: 79/43 cm/s ICA/CCA: PS 2.14                            CONCLUSION: 1.  Severe, shadowing, irregular atheromatous plaque in the right carotid bulb/proximal internal carotid artery. Mild left carotid bulb/proximal internal carotid artery plaque. 2.  Severe right internal carotid artery stenosis (70-99%). 3.  No significant left internal carotid artery stenosis. Evaluation based on velocities and NASCET criteria.           Assessment/Plan:   Ever Crane is a 73 y.o. male, former smoker, with past medical history significant for CAD status post CABG, history of atrial fibrillation, and diabetes was admitted with chest pain and found to have non-ST elevation MI.  Patient underwent left heart cath and this was notable for occluded vein grafts.  Plan for him to undergo CABG later this week CT scan of the chest was done and shows scattered reticular nodular infiltrates bilaterally.  However, patient is completely asymptomatic.  He is afebrile and does not have leukocytosis.  Etiology is unclear however I still suspect that interstitial edema could have same features.  This is especially true that his EF is 45% and he is completely asymptomatic.    Recommendations:  Would check pro-caclcitonin and sputum c/s.  If elevated, would treat with abx  Otherwise, no objection for surgery from pulmonary perspective  Would recommend repeat CT chest in about 4-6 weeks.     Pulmonary will sign off. Case d/w DR Meredith. If conditions changes, please let our service know.    TTS greater than 60 min, more than 50% on counseling and coordination of care    I appreciate the opportunity to participate in the care of Mr Crane.   Please feel free to contact me at any time.    Gail Hopkins MD  Pulmonary and Critical Care Medicine  10/2/2018      Family History   Problem Relation Age of Onset     Sudden death Mother 85     CABG Father      Valvular heart disease Father      Esophageal cancer Father 58     cause of death     No Medical Problems Son      No Medical Problems Grandchild      No Medical Problems Grandchild      Allergies   Allergen Reactions     Latex      Penicillins Rash     Childhood rxn       Medications:     Current Facility-Administered Medications   Medication Dose Route Frequency Provider Last Rate Last Dose     acetaminophen tablet 500 mg (TYLENOL)  500 mg Oral Q4H PRN Miki Mac MD         acetaminophen tablet 650 mg (TYLENOL)  650 mg Oral Q4H PRN Derrick Andersen DO   650 mg at 09/29/18 2051     adenosine 90 mg/90 mL - Pressure Wire - Pt Wt < 120 kg  90 mg  Continuous PRN Miki Mac MD   Stopped at 10/01/18 1052     albuterol nebulizer solution 2.5 mg (PROVENTIL)  2.5 mg Nebulization Q6H PRN Alex Roche MD         calcium (as carbonate) chewable tablet 400 mg (TUMS)  400 mg Oral QID PRN Derrick Andersen DO         dextrose 50 % (D50W) syringe 20-50 mL  20-50 mL Intravenous PRN Alex Roche MD         glipiZIDE (GLUCOTROL) tablet 15 mg  15 mg Oral Daily before supper Alex Roche MD   15 mg at 10/01/18 1753     glipiZIDE tablet 10 mg (GLUCOTROL)  10 mg Oral QAM AC Alex Roche MD   10 mg at 10/02/18 0842     glucagon (human recombinant) injection 1 mg  1 mg Subcutaneous PRN Alex Roche MD         heparin 25,000 units in 0.45% sodium chloride (100 units/ml) 250 mL infusion  1-40 Units/kg/hr Intravenous Continuous Derrick Andersen DO 14.5 mL/hr at 10/02/18 1210 16 Units/kg/hr at 10/02/18 1210     hydrALAZINE tablet 25 mg (APRESOLINE)  25 mg Oral Q4H PRN Derrick Andersen DO         insulin aspart U-100 injection (NovoLOG)   Subcutaneous TID with meals Derrick Andersen DO   6 Units at 10/02/18 1201      insulin aspart U-100 injection (NovoLOG)   Subcutaneous QHS Derrick Andersen DO   2 Units at 09/30/18 2131     isosorbide dinitrate tablet 20 mg (ISORDIL)  20 mg Oral TID dinitrates Scott Briggs MD   20 mg at 10/02/18 1202     loratadine tablet 10 mg (CLARITIN)  10 mg Oral DAILY Alex Roche MD   10 mg at 10/02/18 0842     melatonin tablet 3 mg  3 mg Oral Bedtime PRN Derrick Andersen DO   3 mg at 09/30/18 0002     metoprolol succinate 24 hr tablet 50 mg (TOPROL-XL)  50 mg Oral BID Alex Roche MD   50 mg at 10/02/18 0842     morphine injection 1-2 mg  1-2 mg Intravenous Q3H PRN Miki Mac MD         naloxone injection 0.2-0.4 mg (NARCAN)  0.2-0.4 mg Intravenous PRN Scott Briggs MD        Or     naloxone injection 0.2-0.4 mg (NARCAN)  0.2-0.4 mg Intramuscular PRN Scott Briggs MD         nitroglycerin SL tablet 0.4 mg (NITROSTAT)  0.4 mg Sublingual Q5 Min PRN Yarely Chambers MD         nitroglycerin SL tablet 0.4 mg (NITROSTAT)  0.4 mg Sublingual Q5 Min PRN Alex Roche MD         omeprazole capsule 20 mg (PriLOSEC)  20 mg Oral DAILY Alex Roche MD   20 mg at 10/02/18 0842     ondansetron injection 4 mg (ZOFRAN)  4 mg Intravenous Q4H PRN Derrick Andersen DO         oxyCODONE immediate release tablet 5-10 mg (ROXICODONE)  5-10 mg Oral Q4H PRN Miki Mac MD         simvastatin tablet 40 mg (ZOCOR)  40 mg Oral QHS Alex Roche MD   40 mg at 10/01/18 2140     triamcinolone 0.1 % cream (KENALOG)   Topical BID LAZARO Kaur         vancomycin oral solution 125 mg (VANCOCIN)  125 mg Oral Every Other Day Derrick Andersen DO   125 mg at 10/02/18 0842       Much or all of the text in this note was generated through the use of the Dragon Dictate voice-to-text software. Errors in spelling or words which seem out of context are unintentional. Sound alike errors, in particular, may have escaped editing.

## 2021-06-20 NOTE — PROGRESS NOTES
Patient extubated at 0746, placed on 4lpm NC, No complications at this time.  Will monitor.    TOMMIE DanielsT

## 2021-06-20 NOTE — PROGRESS NOTES
Daily Progress Note      Date of Service: 10/7/2018     Subjective:     Brief History: The patient is 72yo M with CAD s/p CABG in 2000, recent C. Diff infection, Afib, DM2, PAD who presented with chest pain, found to have NSTEMI, MESHA. MESHA initially improved with IVF and holding his diuretics, however has been worsening again since LHC. Pt underwent LHC which showed occluded vein grafts and the patient is now s/p redo CABG on 10/4. Pt extubated 10/5. Had post-op dysphagia, SLP is following and is doing VFSS. Needs NDD1, nectar thick liquids. The patient did need to be paced with pacer wires, chest tube remains in place. He developed some post op encephalopathy - head CT was negative for acute process. He is slowly clearing.  CT is still in place and draining.  There is also concerns for possible air leak  At the time of my evaluation, patient is sitting up in a chair he offers no complaints.  Patient's sister who visited yesterday has expressed concerns about patient's cognitive issues and concerns of hoarding.    Chart reviewed, events noted. Pt seen and examined.     Objective     Vital signs in last 24 hours:  Temp:  [97.9  F (36.6  C)-99.4  F (37.4  C)] 97.9  F (36.6  C)  Heart Rate:  [62-80] 70  Resp:  [18-33] 20  BP: ()/(56-73) 135/73  Weight:   202 lb 9.6 oz (91.9 kg)  Weight change: 9.6 oz (0.272 kg)  Body mass index is 27.48 kg/(m^2).    Intake/Output last 3 shifts:  I/O last 3 completed shifts:  In: 1210 [P.O.:1200; I.V.:10]  Out: 2559 [Urine:2149; Chest Tube:410]  Intake/Output this shift:         Physical Exam:  /73 (Patient Position: Lying)  Pulse 70  Temp 97.9  F (36.6  C) (Oral)   Resp 20  Ht 6' (1.829 m)  Wt 202 lb 9.6 oz (91.9 kg)  SpO2 94%  BMI 27.48 kg/m2    FiO2 (%): 40 % O2 Device: Nasal cannula O2 Flow Rate (L/min): 2.5 L/min    General Appearance: Alert, not in distress.  HEENT: Normocephalic. No scleral icterus. Mucous membranes moist.  Heart: S1 S2 heard. Regular rate and  rhythm.  Lungs: Diminished basal breath sounds, chest tube noted  Abdomen: Soft, non tender, bowel sounds present.  Extremity: No deformity. No joint swelling. No edema.  Neurologic: No new deficits  Skin: No skin rashes      Imaging:  personally reviewed.           Lab Results:  personally reviewed.   not applicable  Recent Results (from the past 24 hour(s))   POCT Glucose    Collection Time: 10/06/18  7:56 AM   Result Value Ref Range    Glucose,  mg/dL   POCT Glucose    Collection Time: 10/06/18 10:39 AM   Result Value Ref Range    Glucose,  mg/dL   POCT Glucose    Collection Time: 10/06/18 12:23 PM   Result Value Ref Range    Glucose,  mg/dL   POCT Glucose    Collection Time: 10/06/18  4:19 PM   Result Value Ref Range    Glucose,  mg/dL   POCT Glucose    Collection Time: 10/06/18  8:32 PM   Result Value Ref Range    Glucose,  mg/dL   Crossmatch    Collection Time: 10/07/18 12:04 AM   Result Value Ref Range    Crossmatch Compatible     Blood Expiration Date [id number]     Unit Type A Pos     Unit Number [id number]     Status Released     Component Red Blood Cells     PRODUCT CODE [id number]     Blood Type 6200     CODING SYSTEM [id number]    Crossmatch    Collection Time: 10/07/18 12:04 AM   Result Value Ref Range    Crossmatch Compatible     Blood Expiration Date [id number]     Unit Type A Pos     Unit Number [id number]     Status Released     Component Red Blood Cells     PRODUCT CODE [id number]     Blood Type 6200     CODING SYSTEM [id number]    POCT Glucose    Collection Time: 10/07/18 12:14 AM   Result Value Ref Range    Glucose,  mg/dL   POCT Glucose    Collection Time: 10/07/18  4:00 AM   Result Value Ref Range    Glucose,  mg/dL   Basic Metabolic Panel    Collection Time: 10/07/18  7:08 AM   Result Value Ref Range    Sodium 141 136 - 145 mmol/L    Potassium 4.3 3.5 - 5.0 mmol/L    Chloride 108 (H) 98 - 107 mmol/L    CO2 21 (L) 22 - 31 mmol/L    Anion  Gap, Calculation 12 5 - 18 mmol/L    Glucose 146 (H) 70 - 125 mg/dL    Calcium 9.6 8.5 - 10.5 mg/dL    BUN 28 8 - 28 mg/dL    Creatinine 1.37 (H) 0.70 - 1.30 mg/dL    GFR MDRD Af Amer >60 >60 mL/min/1.73m2    GFR MDRD Non Af Amer 51 (L) >60 mL/min/1.73m2   Magnesium    Collection Time: 10/07/18  7:08 AM   Result Value Ref Range    Magnesium 2.2 1.8 - 2.6 mg/dL   Potassium - Next AM    Collection Time: 10/07/18  7:08 AM   Result Value Ref Range    Potassium 4.3 3.5 - 5.0 mmol/L   HM1 (CBC with Diff)    Collection Time: 10/07/18  7:08 AM   Result Value Ref Range    WBC 8.6 4.0 - 11.0 thou/uL    RBC 3.18 (L) 4.40 - 6.20 mill/uL    Hemoglobin 8.9 (L) 14.0 - 18.0 g/dL    Hematocrit 29.4 (L) 40.0 - 54.0 %    MCV 93 80 - 100 fL    MCH 28.0 27.0 - 34.0 pg    MCHC 30.3 (L) 32.0 - 36.0 g/dL    RDW 14.4 11.0 - 14.5 %    Platelets 124 (L) 140 - 440 thou/uL    MPV 11.1 8.5 - 12.5 fL    Neutrophils % 77 (H) 50 - 70 %    Lymphocytes % 12 (L) 20 - 40 %    Monocytes % 8 2 - 10 %    Eosinophils % 2 0 - 6 %    Basophils % 1 0 - 2 %    Neutrophils Absolute 6.6 2.0 - 7.7 thou/uL    Lymphocytes Absolute 1.0 0.8 - 4.4 thou/uL    Monocytes Absolute 0.7 0.0 - 0.9 thou/uL    Eosinophils Absolute 0.2 0.0 - 0.4 thou/uL    Basophils Absolute 0.0 0.0 - 0.2 thou/uL         Results from last 7 days  Lab Units 10/07/18  0400 10/07/18  0014 10/06/18  2032 10/06/18  1619 10/06/18  1223 10/06/18  1039 10/06/18  0756 10/06/18  0601 10/06/18  0512 10/06/18  0414   LN-POC GLUCOSE FINGERSTICK- HE mg/dL 154 249 210 163 168 152 133 121 129 131       Assessment: and plan     Continue supportive cares  Continue with telemetry monitoring  Accu-Cheks and sliding scale insulin to be continued  Chest tube management as per recommendations of CV surgery.  Postoperative dysphagia continue with modified diet, anticipate over time some improvement likely  Continue with the remainder of medications  Encourage cardiac rehab as appropriate    Advance Care Planning:    Barriers to discharge: Chest tube  Anticipated discharge day: Unclear at this time may be 2-3 days  Disposition: Likely TCU    Total time spent in direct patient care today 35 minutes  Care plan was discussed with patient, nursing, CV surgery, care management    Micaela Thibodeaux MD. [facility]

## 2021-06-20 NOTE — PROGRESS NOTES
Patient alert and able to make needs known.  Heparin infusing and dose adjusted per protocol to 13 units/kg.  Perseverating on Angiogram tomorrow and needing to get himself prepared and cleaned up for it.  Denies pain.  Tele shows A fib.  Glenda Alarcon RN

## 2021-06-20 NOTE — PROGRESS NOTES
Progress Note    Assessment/Plan  The patient is 74yo M with CAD s/p CABG in 2000, recent C. Diff infection, Afib, DM2, PAD who presented with chest pain, found to have NSTEMI, MESHA. MESHA improved with IVF and holding his diuretics. Pt underwent LHC which showed occluded vein grafts and plan is for redo CABG on 10/4.    NSTEMI  Multivessel CAD  Cardiology following  LHC showed occluded vein grafts  CV surgery following; planning for redo CABG 10/4  Preop CTA chest with reticulonodular infiltrates, underlying chronic disease - appreciate Pulm input - not likely infectious, especially with negative procalcitonin  Carotid dopplers with severe R ICA stenosis; mgmt per CV surgery  Cont metoprolol, statin, imdur, heparin gtt    MESHA  Unclear etiology, may have been due to diuretics  GFR improving with IVF, holding diuretics  IVF have been stopped, cont to hold diuretics  Will need to monitor Cr especially with recent dye with heart cath  Cr up to 1.39 today; will cont to follow  Cont to avoid nephrotoxic agents    Recent C. Diff infection  Has finished PO vanco taper    Afib  Cont heparin gtt  Warfarin on hold  Cont metoprolol    DM2  Currently getting home meds, SSI; will need to transition to insulin perioperatively    HLD  Cont statin    HTN  Cont imdur, metoprolol      VTE prophylaxis: heparin gtt  Disposition: TBD  Barrier to discharge: need for redo CABG  Full Code      Subjective  Pt reports doing well today. No chest pain, occasional feelings of breathlessness but they pass quickly. No cough, nausea.      Review of Systems   12-point review of systems negative except as noted above.    Objective    Physical Exam  /72 (Patient Position: Sitting)  Pulse 65  Temp 98.8  F (37.1  C) (Oral)   Resp 18  Ht 6' (1.829 m)  Wt 194 lb 12.8 oz (88.4 kg)  SpO2 94%  BMI 26.42 kg/m2  Wt Readings from Last 1 Encounters:   10/03/18 0440 194 lb 12.8 oz (88.4 kg)   10/02/18 0335 195 lb 3.2 oz (88.5 kg)   09/30/18 0448 198 lb  11.2 oz (90.1 kg)   09/29/18 0302 200 lb 1.6 oz (90.8 kg)   09/29/18 0010 198 lb (89.8 kg)     Weight change: -6.4 oz (-0.181 kg)  No intake or output data in the 24 hours ending 10/03/18 1623  Gen: WDWN male lying in bed in NAD  HEENT: NC/AT, no scleral icterus, moist oral mucosa  Chest: good inspiratory effort, lungs CTAB  CV: RRR, no M/R/G, 2+ radial pulses, trace peripheral edema  Abd: soft, ND  MSK: no cyanosis/clubbing, able to move all 4 exts  Skin: no rashes, warm, dry  Neuro: A&Ox3, CN 2-12 grossly intact  Psych: Nl mood/affect/judgment    Cultures:  None    Antibiotics:  None    Consultants:  Cardiology  CV Surgery  Pulmonary    Results    Recent Results (from the past 24 hour(s))   Anti-Xa Heparin Level   Result Value Ref Range    Anti-Xa Heparin Assay 0.31 0.30 - 0.70 IU/mL   Procalcitonin   Result Value Ref Range    Procalcitonin 0.06 0.00 - 0.49 ng/mL   POCT Glucose   Result Value Ref Range    Glucose,  mg/dL   POCT Glucose   Result Value Ref Range    Glucose,  mg/dL   Anti-Xa Heparin Level   Result Value Ref Range    Anti-Xa Heparin Assay 0.24 (L) 0.30 - 0.70 IU/mL   Basic Metabolic Panel   Result Value Ref Range    Sodium 140 136 - 145 mmol/L    Potassium 4.2 3.5 - 5.0 mmol/L    Chloride 108 (H) 98 - 107 mmol/L    CO2 23 22 - 31 mmol/L    Anion Gap, Calculation 9 5 - 18 mmol/L    Glucose 130 (H) 70 - 125 mg/dL    Calcium 9.4 8.5 - 10.5 mg/dL    BUN 20 8 - 28 mg/dL    Creatinine 1.39 (H) 0.70 - 1.30 mg/dL    GFR MDRD Af Amer >60 >60 mL/min/1.73m2    GFR MDRD Non Af Amer 50 (L) >60 mL/min/1.73m2   Type and Screen   Result Value Ref Range    ABORh A POS     Antibody Screen Negative Negative   Magnesium   Result Value Ref Range    Magnesium 2.0 1.8 - 2.6 mg/dL   Anti-Xa Heparin Level   Result Value Ref Range    Anti-Xa Heparin Assay 0.40 0.30 - 0.70 IU/mL   HM1 (CBC with Diff)   Result Value Ref Range    WBC 6.3 4.0 - 11.0 thou/uL    RBC 4.00 (L) 4.40 - 6.20 mill/uL    Hemoglobin 10.9 (L)  14.0 - 18.0 g/dL    Hematocrit 34.6 (L) 40.0 - 54.0 %    MCV 87 80 - 100 fL    MCH 27.3 27.0 - 34.0 pg    MCHC 31.5 (L) 32.0 - 36.0 g/dL    RDW 13.6 11.0 - 14.5 %    Platelets 118 (L) 140 - 440 thou/uL    MPV 10.4 8.5 - 12.5 fL    Neutrophils % 67 50 - 70 %    Lymphocytes % 17 (L) 20 - 40 %    Monocytes % 11 (H) 2 - 10 %    Eosinophils % 5 0 - 6 %    Basophils % 1 0 - 2 %    Neutrophils Absolute 4.2 2.0 - 7.7 thou/uL    Lymphocytes Absolute 1.1 0.8 - 4.4 thou/uL    Monocytes Absolute 0.7 0.0 - 0.9 thou/uL    Eosinophils Absolute 0.3 0.0 - 0.4 thou/uL    Basophils Absolute 0.0 0.0 - 0.2 thou/uL   POCT Glucose   Result Value Ref Range    Glucose,  mg/dL   Crossmatch   Result Value Ref Range    Crossmatch Compatible     Blood Expiration Date 20181101235900     Unit Type A Pos     Unit Number [id number]     Status Ready     Component Red Blood Cells     PRODUCT CODE [id number]     Blood Type 6200     CODING SYSTEM [id number]    Crossmatch   Result Value Ref Range    Crossmatch Compatible     Blood Expiration Date 20181101235900     Unit Type A Pos     Unit Number [id number]     Status Ready     Component Red Blood Cells     PRODUCT CODE [id number]     Blood Type 6200     CODING SYSTEM [id number]    ECG 12 lead if not done in past 7 days   Result Value Ref Range    SYSTOLIC BLOOD PRESSURE  mmHg    DIASTOLIC BLOOD PRESSURE  mmHg    VENTRICULAR RATE 78 BPM    ATRIAL RATE 278 BPM    P-R INTERVAL  ms    QRS DURATION 92 ms    Q-T INTERVAL 388 ms    QTC CALCULATION (BEZET) 442 ms    P Axis -79 degrees    R AXIS 10 degrees    T AXIS 44 degrees    MUSE DIAGNOSIS       Atrial flutter with variable A-V block  Nonspecific ST and T wave abnormality  Abnormal ECG  When compared with ECG of 29-SEP-2018 00:12,  ST now depressed in Inferior leads  Nonspecific T wave abnormality now evident in Inferior leads     POCT Glucose   Result Value Ref Range    Glucose,  mg/dL   Anti-Xa Heparin Level   Result Value Ref Range    Anti-Xa  Heparin Assay 0.34 0.30 - 0.70 IU/mL       Cta Head And Neck    Result Date: 10/3/2018  [facility] RADIOLOGY EXAM: CTA HEAD AND NECK LOCATION: [facility] DATE/TIME: 10/3/2018 2:26 PM INDICATION: Severe right carotid stenosis severe right carotid stenosis COMPARISON: 10/1/2018 carotid ultrasound (70-99% right internal carotid artery stenosis). 10/25/2013 at TECHNIQUE: Head and neck CT angiogram with IV contrast. Noncontrast head CT followed by axial helical CT images of the head and neck vessels obtained during the arterial phase of intravenous contrast administration. Axial 2D reconstructed images and multiplanar 3D MIP reconstructed images of the head and neck vessels were performed by the technologist. Dose reduction techniques were used. CONTRAST: Iohexol (Omni) 75mL FINDINGS: NONCONTRAST HEAD CT: INTRACRANIAL CONTENTS: No intracranial hemorrhage, extraaxial collection, or mass effect.  No CT evidence of acute infarct. Mild to moderate presumed chronic small vessel ischemic changes. Mild generalized volume loss. No hydrocephalus. Dense atherosclerotic calcification of the intracranial internal carotid and left vertebral arteries. OSSEOUS STRUCTURES/SOFT TISSUES: No significant abnormality. VISUALIZED ORBITS/SINUSES/MASTOIDS: No significant orbital abnormality. No significant paranasal sinus mucosal disease. No significant middle ear or mastoid effusion. HEAD CTA: ANTERIOR CIRCULATION: No significant stenosis or occlusion. Atherosclerosis in the bilateral cavernous and paraclinoid internal carotid arteries, with at most mild narrowing Standard [place] anatomy. POSTERIOR CIRCULATION: No significant stenosis or occlusion. Dominant leftvertebral artery supplies the basilar artery with a small left vertebral artery supplying the right posterior inferior cerebellar artery (PICA). ANEURYSM/VASCULAR MALFORMATION: None. DURAL VENOUS SINUSES: Expected enhancement of the major dural venous sinuses. NECK  CTA: RIGHT CAROTID: Atherosclerosis of the proximal right internal carotid artery causes 70% stenosis based on NASCET criteria. LEFT CAROTID: Mild atherosclerotic calcification of the proximal left ICA. No measurable stenosis in the left ICA based on NASCET criteria. VERTEBRAL ARTERIES: Dominant right and smaller left vertebral arteries are patent in the neck and into the head. AORTIC ARCH: Classic aortic arch anatomy with no significant stenosis at the origin of the great vessels. MISCELLANEOUS: No evidence for dissection or pseudoaneurysm. Postoperative appearance of prior CABG, incompletely visualized. Restricted motion artifact somewhat compromises evaluation of the lungs. Probable centrilobular and septal emphysema. No acute or aggressive osseous abnormality. Multilevel cervical spondylosis. No severe spinal canal or neural foraminal narrowing.     CONCLUSION: HEAD CT: 1.  No acute intracranial abnormality. 2.  Mild to moderate chronic vascular ischemic changes in the cerebral white matter. HEAD CTA: 1.  No branch vessel occlusion, high-grade stenosis, aneurysm, or high flow vascular malformation involving proximal [place] of [place] vessels. NECK CTA: 1.  Approximately 70% stenosis of the proximal right internal carotid artery due to atherosclerosis based on NASCET-like criteria. 2.  No significant stenosis in the left internal carotid artery.       Pertinent labs/xray/ekg reviewed.        10/3/2018  Karmen Meredith MD  Hospital Medicine Service  Pager [phone]

## 2021-06-20 NOTE — PROGRESS NOTES
Progress Note    Assessment/Plan  The patient is 74yo M with CAD s/p CABG in 2000, recent C. Diff infection, Afib, DM2, PAD who presented with chest pain, found to have NSTEMI, MESHA. MESHA initially improved with IVF and holding his diuretics, however has been worsening again since C. Pt underwent LHC which showed occluded vein grafts and the patient is now s/p redo CABG on 10/4. Pt extubated 10/5. Had post-op dysphagia, SLP is following and is doing VFSS. Needs NDD1, nectar thick liquids. The patient did need to be paced with pacer wires, chest tube remains in place. He developed some post op encephalopathy - head CT was negative for acute process. He is slowly clearing.    NSTEMI  Multivessel CAD  Right carotid stenosis  Acute metabolic encephalopathy  Dysphagia  Cardiology, CV surgery following  LHC showed occluded vein grafts  S/p redo CABG 10/4  Preop CTA chest with reticulonodular infiltrates, underlying chronic disease - appreciate Pulm input - not likely infectious, especially with negative procalcitonin  Carotid dopplers with severe R ICA stenosis; per Vascular Surgery, no indication for surgical intervention at this time; will need outpatient follow up in 3 months to discuss elective R CEA surgery  Able to be extubated 10/5  Some intermittent hypotension requiring occasional pressor support, now weaned off  Minimize sedating agents as much as possible, but continue to work on good pain control as this can precipitate delirium as well; no signs of infection  Head CT negative for acute process  Appreciate SLP input - plan for video swallow study to assess for aspiration - needs NDD1, nectar thick liquids  Chest tube/pacer wires per CV Surgery    MESHA  Unclear etiology, may have been due to diuretics  Held diuretics initially  Will need to monitor Cr especially with recent dye with heart cath; CABG  Cr up to 1.38 today; will cont to follow  Cont to avoid nephrotoxic agents  Cont to monitor UOP which has been on  low side    Recent C. Diff infection  Has finished PO vanco taper    Afib  Warfarin on hold; will need to confirm timing of resumption with CV Surgery  Cont metoprolol    DM2  Currently on insulin gtt periop    HLD  Cont statin    HTN  Cont imdur, metoprolol      VTE prophylaxis: heparin gtt  Disposition: TBD  Barrier to discharge: need for redo CABG; post-op mgmt  Full Code      Subjective  Pt reports incisional chest pain/aching. Denies shortness of breath. Family visiting. He is intermittently confused. Slight nausea.    Review of Systems   12-point review of systems negative except as noted above.    Objective    Physical Exam  /57  Pulse 73  Temp 98.9  F (37.2  C) (Oral)   Resp 19  Ht 6' (1.829 m)  Wt 202 lb (91.6 kg)  SpO2 99%  BMI 27.4 kg/m2  Wt Readings from Last 1 Encounters:   10/06/18 0431 202 lb (91.6 kg)   10/05/18 0600 207 lb 3.2 oz (94 kg)   10/03/18 0440 194 lb 12.8 oz (88.4 kg)   10/02/18 0335 195 lb 3.2 oz (88.5 kg)   09/30/18 0448 198 lb 11.2 oz (90.1 kg)   09/29/18 0302 200 lb 1.6 oz (90.8 kg)   09/29/18 0010 198 lb (89.8 kg)     Weight change: -5 lb 3.2 oz (-2.359 kg)    Intake/Output Summary (Last 24 hours) at 10/06/18 1706  Last data filed at 10/06/18 1442   Gross per 24 hour   Intake             56.6 ml   Output             2427 ml   Net          -2370.4 ml     Gen: WDWN male sitting up in chair in mild distress due to pain  HEENT: NC/AT, no scleral icterus, dry oral mucosa  Chest: good inspiratory effort, lungs diminished at bases  CV: RRR, no M/R/G, vertical midline incision c/d/i, 2+ radial pulses, trace peripheral edema  Abd: soft, ND  MSK: no cyanosis/clubbing, able to move all 4 exts  Skin: no rashes, warm, dry  Neuro: A&O, CN 2-12 grossly intact  Psych: intermittently confused, pleasant, appropriate affect    Cultures:  None    Antibiotics:  None    Consultants:  Cardiology  CV Surgery  Pulmonary  Vascular    Results    Recent Results (from the past 24 hour(s))   POCT  Glucose   Result Value Ref Range    Glucose,  mg/dL   POCT Glucose   Result Value Ref Range    Glucose,  mg/dL   POCT Glucose   Result Value Ref Range    Glucose,  mg/dL   POCT Glucose   Result Value Ref Range    Glucose,  mg/dL   POCT Glucose   Result Value Ref Range    Glucose,  mg/dL   POCT Glucose   Result Value Ref Range    Glucose,  mg/dL   Basic Metabolic Panel   Result Value Ref Range    Sodium 145 136 - 145 mmol/L    Potassium 4.1 3.5 - 5.0 mmol/L    Chloride 114 (H) 98 - 107 mmol/L    CO2 21 (L) 22 - 31 mmol/L    Anion Gap, Calculation 10 5 - 18 mmol/L    Glucose 109 70 - 125 mg/dL    Calcium 9.3 8.5 - 10.5 mg/dL    BUN 23 8 - 28 mg/dL    Creatinine 1.38 (H) 0.70 - 1.30 mg/dL    GFR MDRD Af Amer >60 >60 mL/min/1.73m2    GFR MDRD Non Af Amer 51 (L) >60 mL/min/1.73m2   Magnesium   Result Value Ref Range    Magnesium 2.1 1.8 - 2.6 mg/dL   HM1 (CBC with Diff)   Result Value Ref Range    WBC 9.4 4.0 - 11.0 thou/uL    RBC 3.15 (L) 4.40 - 6.20 mill/uL    Hemoglobin 8.8 (L) 14.0 - 18.0 g/dL    Hematocrit 27.9 (L) 40.0 - 54.0 %    MCV 89 80 - 100 fL    MCH 27.9 27.0 - 34.0 pg    MCHC 31.5 (L) 32.0 - 36.0 g/dL    RDW 14.6 (H) 11.0 - 14.5 %    Platelets 103 (L) 140 - 440 thou/uL    MPV 11.0 8.5 - 12.5 fL    Neutrophils % 83 (H) 50 - 70 %    Lymphocytes % 8 (L) 20 - 40 %    Monocytes % 8 2 - 10 %    Eosinophils % 1 0 - 6 %    Basophils % 0 0 - 2 %    Neutrophils Absolute 7.7 2.0 - 7.7 thou/uL    Lymphocytes Absolute 0.8 0.8 - 4.4 thou/uL    Monocytes Absolute 0.8 0.0 - 0.9 thou/uL    Eosinophils Absolute 0.1 0.0 - 0.4 thou/uL    Basophils Absolute 0.0 0.0 - 0.2 thou/uL   POCT Glucose   Result Value Ref Range    Glucose,  mg/dL   Platelet Count - every other day x 3   Result Value Ref Range    Platelets 103 (L) 140 - 440 thou/uL   POCT Glucose   Result Value Ref Range    Glucose,  mg/dL   POCT Glucose   Result Value Ref Range    Glucose,  mg/dL   POCT  Glucose   Result Value Ref Range    Glucose,  mg/dL   POCT Glucose   Result Value Ref Range    Glucose,  mg/dL       Xr Swallow Study W Speech    Result Date: 10/6/2018  XR SWALLOW STUDY WITH SPEECH 10/06/2018, 10:38 AM INDICATION: Dysphagia. TECHNIQUE: Routine. COMPARISON: None. FINDINGS: FLUOROSCOPIC TIME: 1.5 minutes. NUMBER OF IMAGES: 0. Swallow study with Speech Pathology using multiple barium thicknesses. There is aspiration with thin barium consistency. No laryngeal penetration or aspiration is seen with pudding, honey thick, or nectar barium consistency. Please dedicated Speech Pathology report for further details of examination.       Pertinent labs/xray/ekg reviewed.        10/6/2018  Karmen Meredith MD  Hospital Medicine Service  Pager [phone]

## 2021-06-20 NOTE — PROGRESS NOTES
Patient requested triamcinolone cream for his legs. Dr Siegel was notified. Please check new orders.. Left a voice message for the pateint's sister concerning tomorrow's scheduled time for angiogram per patient's request. .

## 2021-06-20 NOTE — PROGRESS NOTES
Progress Note        BRIEF HOSPITAL COURSE :         The patient is 72yo M with CAD s/p CABG in 2000, recent C. Diff infection, Afib, DM2, PAD who presented with chest pain, found to have NSTEMI, MESHA. MESHA initially improved with IVF and holding his diuretics, however has been worsening again since LHC. Pt underwent LHC which showed occluded vein grafts and the patient is now s/p redo CABG on 10/4. Pt extubated 10/5. Had post-op dysphagia, SLP is following and is doing VFSS. Needs NDD1, nectar thick liquids. The patient did need to be paced with pacer wires, chest tube remains in place. He developed some post op encephalopathy - head CT was negative for acute process. He is slowly clearing.  CT is still in place and draining.  There is also concerns for possible air leak  At the time of my evaluation, patient is sitting up in a chair he offers no complaints.  Patient's sister who visited yesterday has expressed concerns about patient's cognitive issues and concerns of hoarding.         SUBJECTIVE :     On oxygen  No fever  No cp    PLAN :     - iv lasix : for pulmonary congestion.    - continue current cardiac meds, wean off oxygen, pt/ot    - not yet medically ready for discharge.          Barriers to Discharge : iv lasix.  Anticipated discharge : 1-2 days  Disposition : tcu      TIME SPENT : Total time spent > 35 minutes and > 50% time spent on counseling patient about plan of care and coordination of care.            ASSESSME T :              1. NSTEMI : s/p  LHC showed occluded vein grafts    2. Multivessel CAD : s/p redo CABG 10/4 : on baby aspirin, bb, statin.    3. intermittent hypotension requiring occasional pressor support, now weaned off.    4. Post op acute hypoxic respiratory failure : on oxygen    5. Post op pulmonary congestion :  iv lasix    6. Right carotid stenosis, severe : no indication for surgical intervention at this time; will need outpatient follow up in 3 months to discuss elective R CEA  surgery    7. Acute metabolic encephalopathy : stable.     8. Dysphagia : on dysphagia diet.    9. Preop CTA chest with reticulonodular infiltrates, underlying chronic disease - appreciate Pulm input - not likely infectious, especially with negative procalcitonin. Outpatient f/u.    10. MESHA : Unclear etiology, may have been due to diuretics, contrast, slightly up to 1.45---1.25, improving.    11. Recent C. Diff infection : Has finished PO vanco taper    12. Afib, h/o : on Coumadin and Metoprolol.    13. DM2 : on Glipizide 10 mg daily, SSI. Stable.    14. HLD : Cont statin    15. HTN, Essential : Cont imdur, metoprolol               Diet : dysphagia diet    , IV fluids :     , IV Meds : lasix   , Antibiotics     QTc :  DVT Prophylaxis : heparin  Code Status : Full  Admit status : inpatient  Morning labs ordered :               Objective :            Review of Systems   A 12 point comprehensive review of systems was negative except as noted.        Physical Exam    HEENT : no distended veins, no lymphadenopathy, thyroid is normal  LUNGS : b/l air entry present, no significant crackles/wheezing.  ABDOMEN : soft, non-tender, non-distended, BS present.  HEART :  Regular rate & rhythm, S1 & S2 normal, no murmur, clicks/rubs, no ankle edema  NEURO : conscious, oriented, responds to commands, no obvious focal deficit.  MUSCULOSKELETAL / EXTREMITIES :  no calf tenderness.  SKIN : no rashes  BACK : wnl        Pertinent Labs   Lab Results: personally reviewed.   Lab Results   Component Value Date     10/09/2018    K 4.3 10/09/2018    K 4.3 10/09/2018     10/09/2018    CO2 29 10/09/2018    BUN 30 (H) 10/09/2018    CREATININE 1.25 10/09/2018    CALCIUM 9.9 10/09/2018           Pertinent Radiology   Radiology Results: Personally reviewed image/s  EKG Results: not done              DR. ANA OREILLY  [facility]

## 2021-06-20 NOTE — PROGRESS NOTES
CVTS Daily Progress Note   10/8/2018   POD#4 s/p redo sternotomy, CABGx3  Attending: Deshaun   LOS: 9 days     SUBJECTIVE/INTERVAL EVENTS:    No acute events overnight although patient is intermittently delirious per nursing. Alert and oriented on exam this morning. Maintaining oxygen saturations on 0-1L NC. Normotensive. Ambulating with therapy. Pain well controlled although does complain of some chest discomfort related to surgery. +BM / flatus. Tolerating diet. UOP adequate. Chest tube output appropriate. Hgb 8.2. Patient denies new chest pain, shortness of breath, abdominal pain, calf pain, nausea. Patient has no questions today.    OBJECTIVE:    Temp:  [97.8  F (36.6  C)-98.1  F (36.7  C)] 97.8  F (36.6  C)  Heart Rate:  [60-67] 64  Resp:  [16-28] 28  BP: (105-133)/(54-75) 109/58  Wt Readings from Last 2 Encounters:   10/08/18 0628 198 lb 1.6 oz (89.9 kg)   10/07/18 0648 202 lb 9.6 oz (91.9 kg)   10/06/18 0431 202 lb (91.6 kg)   10/05/18 0600 207 lb 3.2 oz (94 kg)   10/03/18 0440 194 lb 12.8 oz (88.4 kg)   10/02/18 0335 195 lb 3.2 oz (88.5 kg)   09/30/18 0448 198 lb 11.2 oz (90.1 kg)   09/29/18 0302 200 lb 1.6 oz (90.8 kg)   09/29/18 0010 198 lb (89.8 kg)   09/01/18 0600 202 lb 1.6 oz (91.7 kg)   08/31/18 1813 198 lb (89.8 kg)   08/31/18 1422 190 lb (86.2 kg)       Current Medications:    Scheduled Meds:    aspirin  81 mg Oral DAILY     bisacodyl  10 mg Rectal Once     cyclobenzaprine  10 mg Oral TID     docusate sodium  100 mg Oral BID     glipiZIDE  10 mg Oral QAM AC     heparin (PF)  5,000 Units Subcutaneous Q8H FIXED TIMES     insulin aspart (NovoLOG) injection   Subcutaneous Q4H FIXED TIMES     lidocaine  1 patch Transdermal DAILY     magnesium hydroxide  30 mL Oral DAILY     melatonin  3 mg Oral QHS     metoprolol tartrate  12.5 mg Oral 0330, 1300, 1700 - CV Metoprolol     metoprolol tartrate  25 mg Oral BID     mupirocin  1 application Each Nare BID     omeprazole  20 mg Oral Q24H     polyethylene glycol   17 g Oral DAILY     simvastatin  40 mg Oral QHS     spironolactone  25 mg Oral DAILY     Continuous Infusions:  PRN Meds:.acetaminophen, albumin human, albuterol, aluminum-magnesium hydroxide-simethicone, bisacodyl, bisacodyl, dextrose 50 % (D50W), gentamicin, glucagon (human recombinant), hydrocortisone, ipratropium-albuterol **AND** Nebulizer treatment intermittent, magnesium hydroxide, naloxone **OR** naloxone, ondansetron, sodium chloride, sodium phosphates 133 mL, traZODone    Cardiographics:    Telemetry monitoring demonstrates NSR with rates in the 60s per my personal review.    Imaging:    Xr Chest 2 Views    Result Date: 10/7/2018  XR CHEST 2 VIEWS 10/7/2018 9:39 AM INDICATION: Icu pt, stable with no clinical status changes s/p cardiac surgery s/p cardiac surgery COMPARISON: 10/05/2018. FINDINGS: Endotracheal tube and right internal jugular vascular sheath have been removed. Left chest tube is unchanged. There is a very small anterior pneumothorax on the left seen only on the lateral view. Mediastinal drains are unchanged mild blunting of the right lateral costophrenic angle consistent with small right pleural effusion. There is mild atelectasis at the right lung base. There are postoperative changes from coronary artery bypass graft surgery.      Lab Results (most recent, reviewed):    Hemoglobin (g/dL)   Date Value   10/08/2018 8.2 (L)     WBC (thou/uL)   Date Value   10/08/2018 7.3     Potassium (mmol/L)   Date Value   10/08/2018 4.1     Creatinine (mg/dL)   Date Value   10/08/2018 1.45 (H)     Hemoglobin A1c (%)   Date Value   08/12/2018 7.2 (H)     Magnesium (mg/dL)   Date Value   10/07/2018 2.2     Calcium (mg/dL)   Date Value   10/08/2018 9.6       I/O:    Intake/Output Summary (Last 24 hours) at 10/08/18 0913  Last data filed at 10/08/18 0628   Gross per 24 hour   Intake              710 ml   Output             1670 ml   Net             -960 ml        UOP: 1.4L    CT: 200cc    Physical  Exam:    General: Patient seen up in chair. NAD. Conversant. Pleasant.   CV: RRR on monitor. 2+ peripheral pulses in all extremities. No edema.   Pulm: Non-labored effort on room air. Chest tubes in place, serosanguinous output, no air leak. Incision  C/D/I.  Abd: Soft, NT, ND  Ext: No pedal edema, SCDs in place, warm, distal pulses intact  Neuro: CNs grossly intact.      ASSESSMENT/PLAN:    Ever Crane is a 73 y.o. male with a history of previous CABG who is POD#4 s/p CABGx3.    Principal Problem:    Non-STEMI (non-ST elevated myocardial infarction) (H)  Active Problems:    Dyslipidemia, goal LDL below 70    Persistent atrial fibrillation (H)    Non-insulin dependent type 2 diabetes mellitus (H)    Coronary artery disease due to lipid rich plaque    PAD (peripheral artery disease) (H)    MESHA (acute kidney injury) (H)    Benign essential hypertension    C. difficile colitis    Abnormal CT scan, chest    Carotid stenosis, asymptomatic, right    S/P CABG x 3      NEURO:   - Scheduled Tylenol for pain  - Melatonin QHS    CV:   - Normotensive  - ASA 81mg  - Metoprolol 25mg two times a day   - Simvastatin 40mg daily  - Chest tubes to be removed later today    PULM:   - Maintaining oxygen saturations on room air  - Encourage pulmonary toilet    FEN/GI:  - Continue electrolyte replacement protocol  - Diet: Cardiac, ADAT  - Bowel regimen    RENAL:  - Adequate UOP/hr. Continue to monitor.  - Cr 1.45 (baseline)  - Lasix 20mg two times a day oral     HEME:  - Acute blood loss anemia post-op.   - Hgb stable (8.2), no bleeding concerns. Hep SQ, [id number]    ID:  - Vida op ppx complete, afebrile. No concerns for infection    ENDO:   - SSI  - PTA Glipizide    PPx:   - DVT: SCDs, SQ heparin TID, ambulation   - GI: Omeprazole    DISPO:   - General telemetry floor       Patient discussed with Dr. Meade.      _______  Margaux Fierro PA-C  Cardiothoracic Surgery  [phone]

## 2021-06-20 NOTE — PROGRESS NOTES
Pt. Only made it through carotid ultrasound and still needs vein mapping in AM. He states the room is small and he would like a sedative before he goes down for that.

## 2021-06-20 NOTE — PROGRESS NOTES
Progress Note    Assessment/Plan  The patient is 74yo M with CAD s/p CABG in 2000, recent C. Diff infection, Afib, DM2, PAD who presented with chest pain, found to have NSTEMI, MESHA. MESHA initially improved with IVF and holding his diuretics, however has been worsening again since LHC. Pt underwent LHC which showed occluded vein grafts and the patient is now s/p redo CABG on 10/4. Pt extubated 10/5. Had post-op dysphagia, SLP is following and is doing VFSS. The patient is currently being paced with pacer wires, chest tube remains in place. He developed some post op encephalopathy - head CT was negative for acute process.    NSTEMI  Multivessel CAD  Right carotid stenosis  Acute metabolic encephalopathy  Dysphagia  Cardiology, CV surgery following  LHC showed occluded vein grafts  S/p redo CABG 10/4  Preop CTA chest with reticulonodular infiltrates, underlying chronic disease - appreciate Pulm input - not likely infectious, especially with negative procalcitonin  Carotid dopplers with severe R ICA stenosis; per Vascular Surgery, no indication for surgical intervention at this time  Able to be extubated 10/5  Requiring pacing at 90bpm currently  Some intermittent hypotension requiring occasional pressor support  Minimize sedating agents as much as possible, but continue to work on good pain control as this can precipitate delirium as well  Head CT today negative for acute process  Appreciate SLP input - plan for video swallow study to assess for aspiration  If unable to take PO, will transition meds to IV form  Cont Ancef  Chest tube/pacer wires per CV Surgery    MESHA  Unclear etiology, may have been due to diuretics  Cont to hold diuretics  Will need to monitor Cr especially with recent dye with heart cath; CABG  Cr up to 1.55 today; will cont to follow  Cont to avoid nephrotoxic agents  Cont to monitor UOP which has been on low side    Recent C. Diff infection  Has finished PO vanco taper    Afib  Warfarin on hold;  will need to confirm timing of resumption with CV Surgery  Cont metoprolol    DM2  Currently on insulin gtt periop    HLD  Cont statin    HTN  Cont imdur, metoprolol      VTE prophylaxis: heparin gtt  Disposition: TBD  Barrier to discharge: need for redo CABG  Full Code      Subjective  Pt reports incisional chest pain/aching today. Denies shortness of breath. Sleepy but arousable, intermittently confused. No nausea.    Review of Systems   12-point review of systems negative except as noted above.    Objective    Physical Exam  BP (!) 88/53 (Patient Position: Semi-pierce)  Pulse 88  Temp 98.4  F (36.9  C) (Bladder)   Resp 23  Ht 6' (1.829 m)  Wt 207 lb 3.2 oz (94 kg)  SpO2 94%  BMI 28.1 kg/m2  Wt Readings from Last 1 Encounters:   10/05/18 0600 207 lb 3.2 oz (94 kg)   10/03/18 0440 194 lb 12.8 oz (88.4 kg)   10/02/18 0335 195 lb 3.2 oz (88.5 kg)   09/30/18 0448 198 lb 11.2 oz (90.1 kg)   09/29/18 0302 200 lb 1.6 oz (90.8 kg)   09/29/18 0010 198 lb (89.8 kg)     Weight change:     Intake/Output Summary (Last 24 hours) at 10/05/18 1404  Last data filed at 10/05/18 1300   Gross per 24 hour   Intake           5424.4 ml   Output             2286 ml   Net           3138.4 ml     Gen: WDWN male lying in bed, ill appearing, in mild distress due to pain  HEENT: NC/AT, no scleral icterus, dry oral mucosa  Chest: good inspiratory effort, lungs diminished at bases  CV: RRR, no M/R/G, vertical midline incision c/d/i, 2+ radial pulses, trace peripheral edema  Abd: soft, ND  MSK: no cyanosis/clubbing, able to move all 4 exts  Skin: no rashes, warm, dry  Neuro: A&O, CN 2-12 grossly intact  Psych: intermittently confused, pleasant, appropriate affect    Cultures:  None    Antibiotics:  None    Consultants:  Cardiology  CV Surgery  Pulmonary  Vascular    Results    Recent Results (from the past 24 hour(s))   INR   Result Value Ref Range    INR 1.64 (H) 0.90 - 1.10   APTT(PTT)   Result Value Ref Range    PTT 38 (H) 24 - 37  seconds   Fibrinogen   Result Value Ref Range    Fibrinogen 322 170 - 510 mg/dL   Platelet Count   Result Value Ref Range    Platelets 100 (L) 140 - 440 thou/uL   POCT Post-op Cardiac   Result Value Ref Range    POC pH 7.245     POC pCO2 49.6 mmHg    POC pO2 144 mmHg    Base Exc, Art -6 mmol/L    POC TCO2 23 mmol/L    POC HCO3 21.5 mmol/L    POC O2 Sat 99 %    POC Hemoglobin Calc 8.8 g/dL    POC HCT 26 %PCV    POC Glucose 143 mg/dL    POC Ionized Calcium 1.24 1.11 - 1.30 mmol/L    POC Sodium 142 136 - 145 mmol/L    POC Potassium 4.5 3.5 - 5.0 mmol/L   POCT Post-op Cardiac   Result Value Ref Range    POC pH 7.315     POC pCO2 40.0 mmHg    POC pO2 140 mmHg    Base Exc, Art -6 mmol/L    POC TCO2 22 mmol/L    POC HCO3 20.4 mmol/L    POC O2 Sat 99 %    POC Hemoglobin Calc 9.2 g/dL    POC HCT 27 %PCV    POC Glucose 121 mg/dL    POC Ionized Calcium 1.16 1.11 - 1.30 mmol/L    POC Sodium 144 136 - 145 mmol/L    POC Potassium 4.1 3.5 - 5.0 mmol/L   Echo Monitor BENJAMÍN   Result Value Ref Range    BSA 2.15 m2    Hieght 72 in    Weight 3116.8 lbs    /76 mmHg    HR 88 bpm   POCT Glucose   Result Value Ref Range    Glucose,  mg/dL   Calcium, Ionized, Measured   Result Value Ref Range    Calcium, Ionized Measured 1.13 1.11 - 1.30 mmol/L    Calcium, Ionized pH 7.4 1.11 1.11 - 1.30 mmol/L    pH 7.35 7.35 - 7.45   Basic metabolic panel   Result Value Ref Range    Sodium 143 136 - 145 mmol/L    Potassium 4.1 3.5 - 5.0 mmol/L    Chloride 114 (H) 98 - 107 mmol/L    CO2 21 (L) 22 - 31 mmol/L    Anion Gap, Calculation 8 5 - 18 mmol/L    Glucose 113 70 - 125 mg/dL    Calcium 8.4 (L) 8.5 - 10.5 mg/dL    BUN 17 8 - 28 mg/dL    Creatinine 1.15 0.70 - 1.30 mg/dL    GFR MDRD Af Amer >60 >60 mL/min/1.73m2    GFR MDRD Non Af Amer >60 >60 mL/min/1.73m2   Magnesium   Result Value Ref Range    Magnesium 2.6 1.8 - 2.6 mg/dL   HM1 (CBC with Diff)   Result Value Ref Range    WBC 8.4 4.0 - 11.0 thou/uL    RBC 3.59 (L) 4.40 - 6.20 mill/uL     Hemoglobin 9.8 (L) 14.0 - 18.0 g/dL    Hematocrit 31.2 (L) 40.0 - 54.0 %    MCV 87 80 - 100 fL    MCH 27.3 27.0 - 34.0 pg    MCHC 31.4 (L) 32.0 - 36.0 g/dL    RDW 13.8 11.0 - 14.5 %    Platelets 98 (L) 140 - 440 thou/uL    MPV 10.2 8.5 - 12.5 fL    Neutrophils % 80 (H) 50 - 70 %    Lymphocytes % 11 (L) 20 - 40 %    Monocytes % 7 2 - 10 %    Eosinophils % 2 0 - 6 %    Basophils % 0 0 - 2 %    Neutrophils Absolute 6.7 2.0 - 7.7 thou/uL    Lymphocytes Absolute 1.0 0.8 - 4.4 thou/uL    Monocytes Absolute 0.6 0.0 - 0.9 thou/uL    Eosinophils Absolute 0.1 0.0 - 0.4 thou/uL    Basophils Absolute 0.0 0.0 - 0.2 thou/uL   INR   Result Value Ref Range    INR 1.45 (H) 0.90 - 1.10   APTT(PTT)   Result Value Ref Range    PTT 38 (H) 24 - 37 seconds   Crossmatch   Result Value Ref Range    Crossmatch Compatible     Blood Expiration Date 20181101235900     Unit Type A Pos     Unit Number [id number]     Status Ready     Component Red Blood Cells     PRODUCT CODE [id number]     Blood Type 6200     CODING SYSTEM [id number]    Crossmatch   Result Value Ref Range    Crossmatch Compatible     Blood Expiration Date [id number]     Unit Type A Pos     Unit Number [id number]     Status Ready     Component Red Blood Cells     PRODUCT CODE A6010L51     Blood Type 6200     CODING SYSTEM [id number]    Blood Gases, Arterial   Result Value Ref Range    pH, Arterial 7.37 7.37 - 7.44    pCO2, Arterial 37 35 - 45 mm Hg    pO2, Arterial 283 (H) 75 - 85 mm Hg    Bicarbonate, Arterial Calc 22.2 (L) 23.0 - 29.0 mmol/L    O2 Sat, Arterial 100.0 (H) 95.0 - 96.0 %    Oxyhemoglobin 97.4 (H) 95.0 - 96.0 %    Base Excess, Arterial Calc -3.5 mmol/L    Ventilation Mode VCV     Rate 16 rr/min    FIO2 100.00     Peep 5 cm H2O    Sample Stabilized Temperature 37.0 degrees C    Ventilator Tidal Volume 600 mL   POCT Glucose   Result Value Ref Range    Glucose,  mg/dL   POCT Glucose   Result Value Ref Range    Glucose,  mg/dL   POCT Glucose   Result  Value Ref Range    Glucose,  mg/dL   POCT Glucose   Result Value Ref Range    Glucose,  mg/dL   POCT Glucose   Result Value Ref Range    Glucose,  mg/dL   Blood Gases, Arterial post extubation   Result Value Ref Range    pH, Arterial 7.31 (L) 7.37 - 7.44    pCO2, Arterial 37 35 - 45 mm Hg    pO2, Arterial 88 (H) 75 - 85 mm Hg    Bicarbonate, Arterial Calc 19.4 (L) 23.0 - 29.0 mmol/L    O2 Sat, Arterial 99.2 (H) 95.0 - 96.0 %    Oxyhemoglobin 95.2 95.0 - 96.0 %    Base Excess, Arterial Calc -7.0 mmol/L    Ventilation Mode VCV     Rate 16 rr/min    FIO2 40.00     Peep 5 cm H2O    Sample Stabilized Temperature 37.0 degrees C    Ventilator Tidal Volume 600 mL   POCT Glucose   Result Value Ref Range    Glucose,  mg/dL   POCT Glucose   Result Value Ref Range    Glucose,  mg/dL   POCT Glucose   Result Value Ref Range    Glucose,  mg/dL   POCT Glucose   Result Value Ref Range    Glucose,  mg/dL   POCT Glucose   Result Value Ref Range    Glucose,  mg/dL   POCT Glucose   Result Value Ref Range    Glucose,  mg/dL   Basic Metabolic Panel   Result Value Ref Range    Sodium 143 136 - 145 mmol/L    Potassium 4.5 3.5 - 5.0 mmol/L    Chloride 114 (H) 98 - 107 mmol/L    CO2 20 (L) 22 - 31 mmol/L    Anion Gap, Calculation 9 5 - 18 mmol/L    Glucose 126 (H) 70 - 125 mg/dL    Calcium 8.7 8.5 - 10.5 mg/dL    BUN 20 8 - 28 mg/dL    Creatinine 1.55 (H) 0.70 - 1.30 mg/dL    GFR MDRD Af Amer 54 (L) >60 mL/min/1.73m2    GFR MDRD Non Af Amer 44 (L) >60 mL/min/1.73m2   INR   Result Value Ref Range    INR 1.48 (H) 0.90 - 1.10   Magnesium   Result Value Ref Range    Magnesium 2.4 1.8 - 2.6 mg/dL   HM1 (CBC with Diff)   Result Value Ref Range    WBC 9.4 4.0 - 11.0 thou/uL    RBC 3.11 (L) 4.40 - 6.20 mill/uL    Hemoglobin 8.8 (L) 14.0 - 18.0 g/dL    Hematocrit 27.6 (L) 40.0 - 54.0 %    MCV 89 80 - 100 fL    MCH 28.3 27.0 - 34.0 pg    MCHC 31.9 (L) 32.0 - 36.0 g/dL    RDW 14.1 11.0 -  14.5 %    Platelets 109 (L) 140 - 440 thou/uL    MPV 10.7 8.5 - 12.5 fL    Neutrophils % 85 (H) 50 - 70 %    Lymphocytes % 7 (L) 20 - 40 %    Monocytes % 8 2 - 10 %    Eosinophils % 0 0 - 6 %    Basophils % 0 0 - 2 %    Neutrophils Absolute 7.9 (H) 2.0 - 7.7 thou/uL    Lymphocytes Absolute 0.6 (L) 0.8 - 4.4 thou/uL    Monocytes Absolute 0.8 0.0 - 0.9 thou/uL    Eosinophils Absolute 0.0 0.0 - 0.4 thou/uL    Basophils Absolute 0.0 0.0 - 0.2 thou/uL   Calcium, Ionized, Measured   Result Value Ref Range    Calcium, Ionized Measured 1.18 1.11 - 1.30 mmol/L    Calcium, Ionized pH 7.4 1.13 1.11 - 1.30 mmol/L    pH 7.29 (L) 7.35 - 7.45   POCT Glucose   Result Value Ref Range    Glucose,  mg/dL   POCT Glucose   Result Value Ref Range    Glucose,  mg/dL   Blood Gases, Arterial   Result Value Ref Range    pH, Arterial 7.35 (L) 7.37 - 7.44    pCO2, Arterial 37 35 - 45 mm Hg    pO2, Arterial 71 (L) 75 - 85 mm Hg    Bicarbonate, Arterial Calc 21.2 (L) 23.0 - 29.0 mmol/L    O2 Sat, Arterial 97.7 (H) 95.0 - 96.0 %    Oxyhemoglobin 94.9 (L) 95.0 - 96.0 %    Base Excess, Arterial Calc -4.7 mmol/L    Ventilation Mode CPAP/PS VC     Rate  rr/min    FIO2 0.60     PSV 5 cm H2O    Peep 5 cm H2O    Sample Stabilized Temperature 37.0 degrees C    Ventilator Tidal Volume  mL   POCT Glucose   Result Value Ref Range    Glucose,  mg/dL   Crossmatch   Result Value Ref Range    Crossmatch Compatible     Blood Expiration Date [id number]     Unit Type A Pos     Unit Number [id number]     Status Released     Component Red Blood Cells     PRODUCT CODE [id number]     Blood Type 6200     CODING SYSTEM [id number]    Crossmatch   Result Value Ref Range    Crossmatch Compatible     Blood Expiration Date 20181025235900     Unit Type A Pos     Unit Number [id number]     Status Released     Component Red Blood Cells     PRODUCT CODE [id number]     Blood Type 6200     CODING SYSTEM [id number]    Blood Gases, Arterial   Result Value Ref  Range    pH, Arterial 7.32 (L) 7.37 - 7.44    pCO2, Arterial 38 35 - 45 mm Hg    pO2, Arterial 82 75 - 85 mm Hg    Bicarbonate, Arterial Calc 20.2 (L) 23.0 - 29.0 mmol/L    O2 Sat, Arterial 99.0 (H) 95.0 - 96.0 %    Oxyhemoglobin 95.0 95.0 - 96.0 %    Base Excess, Arterial Calc -6.0 mmol/L    Ventilation Mode Nasal cannula     Flow 6.0 LPM    Sample Stabilized Temperature 37.0 degrees C       Xr Chest 1 View Portable    Result Date: 10/5/2018  XR CHEST 1 VIEW PORTABLE 10/5/2018 5:20 AM INDICATION: Icu pt, stable with no clinical status changes s/p cardiac surgery s/p cardiac surgery COMPARISON: 10/04/2018 FINDINGS: Lines and support tubes are stable in position. Patient is status post median sternotomy and CABG with stable heart size. Lung volumes have decreased somewhat in the interval, with increasing interstitial opacities compatible with edema.. There  are persistent bilateral effusions.    Xr Chest 1 View Portable    Result Date: 10/4/2018  XR CHEST 1 VIEW PORTABLE 10/4/2018 3:48 PM INDICATION: Icu pt, newly admitted s/p cardiac surgery s/p cardiac surgery COMPARISON: Chest x-ray 09/29/2018 FINDINGS: Endotracheal tube with tip located 5.6 cm above the jakob. Right IJ catheter with tip located in the SVC. Left-sided chest tube projecting over the left lung base. No definite pneumothorax. Small bilateral pleural effusions with adjacent opacities likely reflecting atelectasis, right greater than left. Stable cardiomegaly and central vascular prominence.    Ct Head Without Contrast    Result Date: 10/5/2018  [facility] RADIOLOGY EXAM: CT HEAD WO CONTRAST LOCATION: [facility] DATE/TIME: 10/5/2018 8:25 AM INDICATION: Left arm weakness; disorientation neuro changes COMPARISON: Head and neck CTA performed the day prior. TECHNIQUE: Routine without IV contrast. Multiplanar reformats. Dose reduction techniques were used. FINDINGS: Mildly motion degraded exam. No definite evidence of acute intracranial hemorrhage  or mass effect. Scattered foci of decreased attenuation within the cerebral hemispheric white matter which are nonspecific, though most commonly ascribed to chronic small vessel ischemic disease. The ventricles and sulci are prominent consistent with moderate brain parenchymal volume loss. Gray-white matter differentiation is maintained. The basilar cisterns are patent. The globes are unremarkable. The partially imaged paranasal sinuses, mastoid air cells and middle ear cavities are unremarkable. The visualized skull base and calvarium are unremarkable.     CONCLUSION:  1.  Mildly motion degraded exam. 2.  No definite evidence of acute intracranial hemorrhage or mass effect. 3.  Mild to moderate nonspecific white matter changes. 4.  Moderate brain parenchymal volume loss.       Pertinent labs/xray/ekg reviewed.        10/5/2018  Karmen Meredith MD  Hospital Medicine Service  Pager [phone]

## 2021-06-20 NOTE — PROGRESS NOTES
[facility]      Facility:    [facility] [[id number]]  Code Status: UNKNOWN      Chief Complaint/Reason for Visit:  Chief Complaint   Patient presents with     H & P     Non-ST segment myocardial infarction, acute kidney injury, status post redo of coronary artery bypass grafting, acute kidney injury, postoperative dysphasia, need for pacer placement, acute encephalopathy,.       HPI:   Ever is a 73 y.o. male who recently admitted to the hospital on 9/29/2018.  He does a past medical history of recent C. difficile infection to his atrial fibrillation is and currently anticoagulated he also is type 2 diabetes and peripheral artery disease.  His presenting signs and symptoms were chest pain and he was worked up very thoroughly and was found to have an non-ST segment MI.  He also had acute kidney injury that did improve with IV fluids however the it was found to have an occluded vein graft from his previous CABG and did a redo of his CABG on 10 4.  He was extubated a day after surgery and he did have postoperative dysphagia and speech therapy did follow.  He is on modified NDD 1 and nectar thick liquids.  He did need to be paced with pacer wires and his chest tube did remain in place.  He did develop some encephalopathy head CT scan was negative.  And he showed significant improvement.  Chest tubes were removed prior to discharge and was treated appropriately and transferred here to the TCU at [facility] in stable condition.    Patient has had shortness of breath this morning.  His pain is been pretty much well managed and he has no other problems.  He says he not short of breath at this time however this is quite concerning.  He has no other issues at this time.    Past Medical History:  Past Medical History:   Diagnosis Date     Atrial flutter (H)      Candidiasis of perineum 1/3/2018     Coronary artery disease due to lipid rich plaque 2000    CABG x2      Diabetic ulcer of both feet (H) 10/31/2017     Dyslexia      Dyslipidemia, goal LDL below 70 2000     Essential hypertension      Gangrene of left foot (H)      Neuropathy (H)      Paroxysmal Atrial Fibrillation     Brian Moe: 8/2011 Cardioversion; CHADS2 VASC = 5; he is on warfarin and sotalol      Type 2 diabetes mellitus, without long-term current use of insulin (H)      Unable to function independently 11/13/2017           Surgical History:  Past Surgical History:   Procedure Laterality Date     CARDIOVERSION  8/25/2011     CORONARY ARTERY BYPASS GRAFT  3/6/2000    SVG to OM1, SVG to PDA     CV CORONARY ANGIOGRAM N/A 10/1/2018    Procedure: Coronary Angiogram;  Surgeon: Miki Mac MD;  Location: [facility];  Service:      FOOT AMPUTATION Left 11/5/2017    Procedure: LEFT TRANSMETATARSAL AMPUTATION;  Surgeon: Ever Wick MD;  Location: [facility] OR;  Service:      INGUINAL HERNIA REPAIR Bilateral 1969 and 1979       Family History:   Family History   Problem Relation Age of Onset     Sudden death Mother 85     CABG Father      Valvular heart disease Father      Esophageal cancer Father 58     cause of death     No Medical Problems Son      No Medical Problems Grandchild      No Medical Problems Grandchild        Social History:    Social History     Social History     Marital status:      Spouse name: N/A     Number of children: 1     Years of education: N/A     Occupational History      Self Employed     he states he is still working despite being in TCU in 2018     Social History Main Topics     Smoking status: Former Smoker     Packs/day: 1.00     Years: 36.00     Types: Cigarettes     Start date: 6/13/1964     Quit date: 4/30/2000     Smokeless tobacco: Never Used     Alcohol use 3.0 oz/week     2 Glasses of wine, 3 Cans of beer per week     Drug use: No     Sexual activity: Not Currently     Partners: Female     Other Topics Concern     None     Social History  "Narrative    Ever lived with his son Raciel in [place], [place], but then he went to [facility] after foot amputation.  Ever states he will move to a facility in [place] in June, 2018. The [facility] papers say he uses the \"blue ride\" but Ever states he has his own vehicle on the campus and is still doing plumbing work in the spring 2018.  Our reception staff at [facility] in [place] confirmed on June 13, 2018 that Ever brought himself to the campus and did not utilize a family member or ride service.  I would also note that he states his granddaughter is name Claire and not Leonela, so I corrected that in the chart (ROSALIA Moe MD).              Review of Systems   Constitutional:        Positive for shortness of breath and cough with cough with eating.  He is supposed to be on nectar thick liquids at this time.  He denies any fevers chills nausea vomiting diarrhea change in vision hearing taste or smell weakness one side the other.  His pain is well-managed at this time the remainder the review of systems is negative.       Vitals:    10/11/18 1019   BP: 114/63   Pulse: 71   Resp: 18   Temp: 98.1  F (36.7  C)   SpO2: 92%       Physical Exam   Constitutional: No distress.   HENT:   Head: Normocephalic and atraumatic.   Nose: Nose normal.   Mouth/Throat: No oropharyngeal exudate.   Eyes: Conjunctivae are normal. Right eye exhibits no discharge. Left eye exhibits no discharge.   Neck: Neck supple.   Cardiovascular:   Patient's heart sounds are irregularly irregular with adequate rate control.   Pulmonary/Chest:   Diminished breath sounds bilateral in the lower fields.  Mild crackles but no wheezes.   Abdominal: Soft. Bowel sounds are normal. He exhibits distension. There is no tenderness.   Musculoskeletal: He exhibits edema.   Neurological: He is alert. No cranial nerve deficit. He exhibits normal muscle tone.   Skin: Skin is warm and dry. He is not diaphoretic.   Psychiatric: He has a normal mood " and affect. His behavior is normal.       Medication List:  Current Outpatient Prescriptions   Medication Sig     acetaminophen (TYLENOL) 325 MG tablet Take 2 tablets (650 mg total) by mouth every 6 (six) hours as needed for pain.     albuterol (PROVENTIL) 2.5 mg /3 mL (0.083 %) nebulizer solution Take 3 mL (2.5 mg total) by nebulization every 6 (six) hours as needed for wheezing or shortness of breath.     aluminum-magnesium hydroxide-simethicone (MAALOX ADVANCED) 200-200-20 mg/5 mL Susp Take 30 mL by mouth every 4 (four) hours as needed (gastrointestinal upset).     aspirin 81 mg chewable tablet Chew 1 tablet (81 mg total) daily.     bisacodyl (DULCOLAX) 5 mg EC tablet Take 2 tablets (10 mg total) by mouth daily as needed for constipation.     cetirizine (ZYRTEC) 10 MG tablet Take 10 mg by mouth daily.     furosemide (LASIX) 20 MG tablet Take 20 mg by mouth daily as needed. If weight increases 2 lbs in 24 hours.     furosemide (LASIX) 40 MG tablet Take 40 mg by mouth daily.     gentamicin (GARAMYCIN) 0.1 % ointment Apply 1 application topically daily as needed.     glipiZIDE (GLUCOTROL) 10 MG tablet Take 10 mg by mouth daily before breakfast.     glipiZIDE (GLUCOTROL) 10 MG tablet Take 15 mg by mouth every evening.     hydrocortisone 1 % cream Apply 1 application topically 2 (two) times a day as needed.     Lactobacillus acidophilus 100 mg (1 billion cell) cap Take 1 capsule by mouth 2 (two) times a day. (Patient taking differently: Take 2 capsules by mouth 2 (two) times a day. )     loratadine (CLARITIN) 10 mg tablet Take 10 mg by mouth daily.     metoprolol tartrate (LOPRESSOR) 25 MG tablet Take 1 tablet (25 mg total) by mouth 2 (two) times a day.     miconazole (MICOTIN) 2 % powder Apply 1 application topically daily as needed for itching.     miconazole (SECURA EXTRA THICK) 2 % cream Apply 1 application topically 2 (two) times a day as needed.     miconazole nitrate-zinc ox-pet (VUSION) 0.25-15-81.35 % Oint  Apply 1 application topically 2 (two) times a day as needed.     min oil-petrolat (AQUAPHOR) ointment Apply 1 application topically 3 (three) times a day as needed.     nitroglycerin (NITROSTAT) 0.4 MG SL tablet Place 0.4 mg under the tongue every 5 (five) minutes as needed for chest pain.      NOVOLOG U-100 INSULIN ASPART 100 unit/mL injection Check blood sugar four (4) times daily.  11.9 Type 2 without complications     omeprazole (PRILOSEC) 20 MG capsule Take 20 mg by mouth daily.      simvastatin (ZOCOR) 40 MG tablet Take 40 mg by mouth at bedtime.      spironolactone (ALDACTONE) 25 MG tablet Take 25 mg by mouth daily.     triamcinolone (KENALOG) 0.1 % cream Apply to lower legs and hands two times a day for 10 days only - not to face     urea 10 % lotion Apply 1 application topically daily as needed.     warfarin (COUMADIN/JANTOVEN) 5 MG tablet Take one 5mg tablet tonight and have labs rechecked and coumadin dosed based on INR       Labs: Hospital labs are as follows; magnesium was 2.1, sodium was 139, potassium is 4.1, creatinine 1.12, GFR is greater than 60, BUN is 26.  INR is 1.24, white count was 5.1, hemoglobin was 8.7, platelets 164,000.  Troponins were elevated however I do not have that available in the chart at this time.      Assessment:    ICD-10-CM    1. MI, acute, non ST segment elevation (H) I21.4    2. S/P CABG x 1 Z95.1    3. Dysphasia R47.02    4. Carotid stenosis, right I65.21    5. Acute metabolic encephalopathy G93.41    6. Atrial fibrillation (H) I48.91        Plan: Plan at this time speech therapies to evaluate and treat chest x-ray PA and lateral be done today secondary to cough and shortness of breath.  We will get a CBC today second and basic basic metabolic profile and all labs to be done and diagnostic studies will be done stat.  Differential diagnosis for his shortness of breath and cough include possible aspiration syndrome aspiration pneumonia versus healthcare acquired pneumonia.   INR is management is per Coumadin clinic.  No other changes to care plan at this time.        Electronically signed by: Toni Fisher DO

## 2021-06-20 NOTE — PROGRESS NOTES
10/05/18 1153   Reason for Assessment (RCAT)   RCAT Assesment Initial   Assessment Reason  Thoracic surgery;Cardiac surgery   $ RCAT Eval Time 30 min.  Yes   Vitals (RCAT)   Heart Rate 87   Resp 18   SpO2 96 %   Chart Assessment (RCAT)   Pulmonary Status  2   Surgical Status  3   Chest Xray  2   Patient Assessment (RCAT)    Respiratory Pattern  0   Mental Status  0   Breath Sounds  3   Cough Effectiveness  0   Level of Activity  1   O2 Required SpO2 >= 92%  2   Chart + Pt. Assessment Total Points  13   RCAT Acuity Score 13 (Acuity 3)   Clinical Indications (RCAT)   Aerosol Hygiene Physician order   RCAT Order Placed  Yes       TOMMIE DanielsT

## 2021-06-20 NOTE — PROGRESS NOTES
Spiritual Care Note    Spiritual Assessment: Pt is Mandaen. Requests writer contact his parish with hopes of a visit from the . Ailin central to pt's sense of well being. Understands that body and spirit are connected.    Care Provided: Support for both pt and his sister, Miriam Martin, through conversation and prayer.    Plan of Care: Our department will follow as needed or as requested. Writer will contact [facility].    KIAN Shell.

## 2021-06-20 NOTE — PROGRESS NOTES
[facility] Internal Medicine Progress Note       ASSESSMENT:    Principal Problem:    Chest pain  Active Problems:    Dyslipidemia, goal LDL below 70    Persistent atrial fibrillation (H)    Non-insulin dependent type 2 diabetes mellitus (H)    Coronary artery disease due to lipid rich plaque    PAD (peripheral artery disease) (H)    Benign essential hypertension    Non-STEMI (non-ST elevated myocardial infarction) (H)      PLAN:  Patient seen and examined. Historical data reviewed. Please see admission H/P for additional information.     Trop elevated, consistent with NSTEMI  -- start IV heparin, ask cardiology to see, consider TTE vs stress imaging, continue to trend troponin  -- continue metoprolol and statin    MESHA: unclear etiology, question due to diuretics.   -- continue hydration, trend GFR, hold diuretics for now    H/O A. fib: inr subtherapeutic  -- IV heparin as above   -- continue metoprolol   -- hold warfarin while on heparin    Dm2: resume home medications and add SSI    HLP: continue statin    HTN: BP management per cards        Derrick Andersen D.O.  [phone]

## 2021-06-20 NOTE — PROGRESS NOTES
Pt arrived from 5100 via w/c. Pt pivoted to lounge chair with assist of 2. Chest tube x 3 in place. Air leak noted as reported from 5100 nurse. Oxygen in place @ 3L/NC. Pacer wires in place. Chest incision c/d/i. Left LE incision c/d/i. Noted left foot half amputated. Will continue to monitor.

## 2021-06-20 NOTE — H&P (VIEW-ONLY)
"      Cardiothoracic Surgery Consult    Date of Service: 10/1/2018    REFERRING CARDIOLOGIST: Dr. Mac    REASON FOR CONSULTATION: Coronary artery disease, previous CABG     HISTORY OF PRESENT ILLNESS: Mr. Ever Crane is a 73 y.o. male presenting with chest pain, NSTEMI, previous CABG.  He developed chest pain Friday afternoon after eating summer sausage.  He lives in assisted living.  Nursing there was unable to give him [place].  He requested an ambulance ride to a \"place where he could get some [place].\"  EMS brought him to [facility].    He had coronary angiogram today showing  of RCA, serial lesion in LAD, ostial COLUMBA lesion, OM lesion.    He is seen today in hospital.  He is lying flat in bed after his angiogram.         ASSESSMENT and PLAN:   1) Recommend redo CABG, bilateral PVI, LAAL  2) Check CTA chest to evaluate for redo sternotomy and patency of DANIELA vessels  3) Bilateral carotid ultrasound  4) Lower extremity vein mapping      PAST MEDICAL HISTORY:   Past Medical History:   Diagnosis Date     Atrial flutter (H)      Candidiasis of perineum 1/3/2018     Coronary artery disease due to lipid rich plaque 2000    CABG x2     Diabetic ulcer of both feet (H) 10/31/2017     Dyslexia      Dyslipidemia, goal LDL below 70 2000     Essential hypertension      Gangrene of left foot (H)      Neuropathy (H)      Paroxysmal Atrial Fibrillation     Brian Moe: 8/2011 Cardioversion; CHADS2 VASC = 5; he is on warfarin and sotalol      Type 2 diabetes mellitus, without long-term current use of insulin (H)      Unable to function independently 11/13/2017       PAST SURGICAL HISTORY:   Past Surgical History:   Procedure Laterality Date     CARDIOVERSION  8/25/2011     CORONARY ARTERY BYPASS GRAFT  3/6/2000    SVG to OM1, SVG to PDA     FOOT AMPUTATION Left 11/5/2017    Procedure: LEFT TRANSMETATARSAL AMPUTATION;  Surgeon: Ever Wick MD;  Location: [facility];  Service:      INGUINAL " HERNIA REPAIR Bilateral 1969 and 1979       ALLERGIES:   Allergies   Allergen Reactions     Latex      Penicillins Rash     Childhood rxn       CURRENT MEDICATIONS:  No current facility-administered medications on file prior to encounter.      Current Outpatient Prescriptions on File Prior to Encounter   Medication Sig Dispense Refill     acetaminophen (TYLENOL) 500 MG tablet Take 2 tablets (1,000 mg total) by mouth every 6 (six) hours as needed.  0     furosemide (LASIX) 20 MG tablet Take 20 mg by mouth daily as needed. If weight increases 2 lbs in 24 hours.       glipiZIDE (GLUCOTROL) 10 MG tablet Take 10 mg by mouth daily before breakfast.       glipiZIDE (GLUCOTROL) 10 MG tablet Take 15 mg by mouth every evening.       lisinopril (PRINIVIL,ZESTRIL) 5 MG tablet Take 5 mg by mouth daily.       loratadine (CLARITIN) 10 mg tablet Take 10 mg by mouth daily.       metoprolol succinate (TOPROL-XL) 50 MG 24 hr tablet Take 50 mg by mouth 2 (two) times a day.        min oil-petrolat (AQUAPHOR) ointment Apply 1 application topically 3 (three) times a day as needed. 396 g 11     nitroglycerin (NITROSTAT) 0.4 MG SL tablet Place 0.4 mg under the tongue every 5 (five) minutes as needed for chest pain.        omeprazole (PRILOSEC) 20 MG capsule Take 20 mg by mouth daily.        simvastatin (ZOCOR) 40 MG tablet Take 40 mg by mouth at bedtime.        triamcinolone (KENALOG) 0.1 % cream Apply to lower legs and hands two times a day for 10 days only - not to face 30 g 0     vancomycin (VANCOCIN) 125 MG capsule Take 1 tab PO QID for 11 days, then 1 tab BID x7 days, then 1 tab daily x7 days, then 1 tab QOD x14 days then stop. 72 capsule 0     albuterol (PROVENTIL) 2.5 mg /3 mL (0.083 %) nebulizer solution Take 3 mL (2.5 mg total) by nebulization every 6 (six) hours as needed for wheezing or shortness of breath.  0     Lactobacillus acidophilus 100 mg (1 billion cell) cap Take 1 capsule by mouth 2 (two) times a day. (Patient taking  "differently: Take 2 capsules by mouth 2 (two) times a day. ) 60 capsule 0       FAMILY HISTORY:   Family History   Problem Relation Age of Onset     Sudden death Mother 85     CABG Father      Valvular heart disease Father      Esophageal cancer Father 58     cause of death     No Medical Problems Son      No Medical Problems Grandchild      No Medical Problems Grandchild      The family history was reviewed and is not pertinent to the patient's disease/illness    SOCIAL HISTORY:   Social History     Social History     Marital status:      Spouse name: N/A     Number of children: 1     Years of education: N/A     Occupational History      Self Employed     he states he is still working despite being in TCU in 2018     Social History Main Topics     Smoking status: Former Smoker     Packs/day: 1.00     Years: 36.00     Types: Cigarettes     Start date: 6/13/1964     Quit date: 4/30/2000     Smokeless tobacco: Never Used     Alcohol use 3.0 oz/week     2 Glasses of wine, 3 Cans of beer per week     Drug use: No     Sexual activity: Not Currently     Partners: Female     Other Topics Concern     Not on file     Social History Narrative    Ever lived with his son Raciel in [place], 2017, but then he went to [facility] after foot amputation.  Ever states he will move to a facility in [place] in June, 2018. The [facility] papers say he uses the \"blue ride\" but Ever states he has his own vehicle on the campus and is still doing plumbing work in the spring 2018.  Our reception staff at [facility] in [place] confirmed on June 13, 2018 that Ever brought himself to the campus and did not utilize a family member or ride service.  I would also note that he states his granddaughter is name Claire and not Leonela, so I corrected that in the chart (ROSALIA Moe MD).           REVIEW OF SYSTEMS:  Review of Systems - A complete 10 point review of systems was obtained and is negative other than the " above stated complaints    PHYSICAL EXAMINATION:   Vitals: /73  Pulse 69  Temp 98.6  F (37  C) (Oral)   Resp 16  Ht 6' (1.829 m)  Wt 198 lb 11.2 oz (90.1 kg)  SpO2 92%  BMI 26.95 kg/m2  GENERAL:  Well developed and well nourished  HEENT: Atraumatic, normocephalic, pupils equal and reactive, CN 2-12 intact  NECK: Supple, trachea midline, no thyromegaly, no lymphadenopathy  CARDIOVASCULAR: RRR  RESPIRATIONS: CTAB  ABDOMEN: Soft, not tender, not distended  EXTREMITIES: Motor, sensation, pulses intact  NEUROLOGIC: intact and symmetric with no focal deficits  PSYCHIATRIC: alert and oriented x3, pleasant    LABORATORY STUDIES:   Lab Results   Component Value Date    WBC 7.6 09/29/2018    HGB 12.4 (L) 10/01/2018    HCT 41.1 09/29/2018    MCV 86 09/29/2018     (L) 10/01/2018      Lab Results   Component Value Date    CREATININE 1.13 10/01/2018    BUN 15 10/01/2018     10/01/2018    K 4.5 10/01/2018     10/01/2018    CO2 25 10/01/2018     Lab Results   Component Value Date    HGBA1C 7.2 (H) 08/12/2018       EKG(9/29/18):   Atrial flutter with variable A-V block   Nonspecific ST abnormality   Abnormal ECG   When compared with ECG of 25-JAN-2018 11:10,   No significant change was found   Confirmed by LISA DÍAZ, Mize LOC: ([id number]) on 9/29/2018 3:10:38 PM   CARDIAC CATHETERIZATION (10/1/18):   This study was personally reviewed by me  Conclusion     Estimated blood loss was <20 ml.    Prox Cx to Mid Cx lesion 70% stenosed.    Dist Cx lesion 90% stenosed.    Ost 1st Mrg to 1st Mrg lesion 100% stenosed.    Dist LAD-1 lesion 50% stenosed.    Dist LAD-2 lesion 90% stenosed.    Mid LAD lesion 70% stenosed.    Pressure wire/FFR was performed on the lesion. pre diagnositic: 0.82. post diagnostic: 0.65.    Pressure wire/FFR was performed on the lesion.    Prox RCA to Dist RCA lesion 99% stenosed.    Dist Graft lesion 100% stenosed.    Origin to Dist Graft lesion 100% stenosed.         TRANSTHORACIC  ECHOCARDIOGRAM (9/30/18):  This study was personally reviewed by me  Summary     1.Left ventricle ejection fraction is mildly decreased. The estimated left ventricular ejection fraction is 45%.    2.More pronounced inferolateral hypokinesis noted.    3.TAPSE is abnormal, which is consistent with abnormal right ventricular systolic function.    4.Mild regurgitant lesions of all 4 cardiac valves seen.    5.Mild pulmonary hypertension suggested.    6.When compared to the previous study dated 1/25/2018, overall left ventricular ejection fraction is lower on current study, inferolateral hypokinesis appears to be a larger distribution.         STS RISK CALCULATION:  Risk Model and Variables - STS Adult Cardiac Surgery Database Version 2.81  RISK SCORES  About the STS Risk Calculator  Procedure: MV Replacement Only   Risk of Mortality: 1.992%   Morbidity or Mortality: 18.285%   Long Length of Stay: 7.773%   Short Length of Stay: 20.062%   Permanent Stroke: 1.602%   Prolonged Ventilation: 10.592%   DSW Infection: 0.217%   Renal Failure: 2.932%   Reoperation: 8.553%         Thank you very much for this referral.     John Meade 10/1/2018 4:41 PM  Cardiothoracic Surgery  Pager: [phone]

## 2021-06-20 NOTE — PROGRESS NOTES
Progress Note    Assessment/Plan  S/P Redo CABG X 3/PVI/LAAL POD # 1  Awake and alert, laying in bed at time of evaluation  Hypotensive requiring Levophed support, and the sinus bradycardia, rate in the 40s, V paced at 90  Complaint of severe incisional chest pain on left upper back pain in the distribution of the chest tube  Pain is been managed with Dilaudid however given the patient's encephalopathy consent, Dilaudid would not be the best option at this time  Toradol is also not an option for pain management for this patient secondary to MESHA, with Cr 1.55 up from 1.34 yesterday  Tylenol 650 mg every 6 hours scheduled for 24 hours  Flexeril 10 mg every 8 hours for muscle spasm.  There is contraindication for the use of this medication because of his recent MRI and heart failure however since patient is on a telemetry monitor, we used this medication on this short-term to control pain secondary to muscle spasm  Acute postop pulmonary insufficiency requiring supplemental oxygen 6 L nasal cannula, saturation 94%  Nursing reported the patient had a left upper extremity drift and was delirious  Start CT scan of the head was obtained, which showed no evidence of acute intracranial hemorrhage or mass-effect  Patient is moving upper and lower extremity with a very strong  on the left upper extremity  Postop cardiovascular insufficiency resolved requiring epinephrine and levo fed support  Drips: Epinephrine 0.01 mcg/kg/min, Levophed 2 mcg/min  Recent NSTEMI, dyslipidemia, persistent A. fib, peripheral arterial disease benign hypertension, asymptomatic right carotid stenosis  Incisions are clean dry intact, lung sounds diminished bilaterally  Chest tube drainage 300/780, serosanguineous  Leave chest tube in today due to excessive drainage  Abdomen is obese and mildly distended, with sluggish/hypoactive bowel sounds in all 4 quadrants  Urine output marginal, no BM yet  Weight 94 kg, preop weight 89.4 kg  Hold off  diuresis this morning secondary to MESHA  Mobilize out of bed to chair and continue pulmonary toilet  Keep patient in the unit today secondary to pulmonary insufficiency and encephalopathy concerns      Subjective  Significant incisional chest pain and significant shortness of breath went to chest pain  Objective  Awake and alert, laying in bed at time of evaluation and appears to be in pain  Vital signs in last 24 hours  Temp:  [97.3  F (36.3  C)-101.1  F (38.4  C)] 98.4  F (36.9  C)  Heart Rate:  [80-90] 89  Resp:  [14-35] 34  BP: ()/(53-78) 88/53  Arterial Line BP: ()/() 102/55  FiO2 (%):  [40 %-100 %] 40 %  Weight:   207 lb 3.2 oz (94 kg)  Preop weight 89.8 kg  Intake/Output last 3 shifts  I/O last 3 completed shifts:  In: 6924.4 [I.V.:4404.4; Blood:420; IV Piggyback:2100]  Out: 2483 [Urine:1103; Blood:600; Chest Tube:780]  Intake/Output this shift:       Review of Systems   A 12 point comprehensive review of systems was negative except as noted.    Physical Exam  BP (!) 88/53 (Patient Position: Semi-pierce)  Pulse 89  Temp 98.4  F (36.9  C) (Bladder)   Resp 16  Ht 6' (1.829 m)  Wt 207 lb 3.2 oz (94 kg)  SpO2 96%  BMI 28.1 kg/m2  HRR sinus bradycardia/junctional rhythm, rate in the 40s, AV paced at 90  Incisions are clean dry and intact, lung sounds diminished bilaterally  Abdomen obese mildly distended nontender  Mild bilateral nonpitting lower extremity edema    Pertinent Labs   Lab Results: personally reviewed.   Lab Results   Component Value Date     10/05/2018    K 4.5 10/05/2018     (H) 10/05/2018    CO2 20 (L) 10/05/2018    BUN 20 10/05/2018    CREATININE 1.55 (H) 10/05/2018    CALCIUM 8.7 10/05/2018     Lab Results   Component Value Date    WBC 9.4 10/05/2018    HGB 8.8 (L) 10/05/2018    HCT 27.6 (L) 10/05/2018    MCV 89 10/05/2018     (L) 10/05/2018       Pertinent Radiology   Radiology Results: Personally reviewed image/s, Personally reviewed impression/s and  Bilateral pulmonary congestion and basilar atelectasis  EKG Results: personally reviewed.  and Sinus bradycardia/junctional rhythm, rate of 40, currently V-paced at 90    Domingo THERESE [place]

## 2021-06-20 NOTE — PROGRESS NOTES
Barely had time to check pt. Groin before he went to [facility] for tests.  Groin intact with band aid on it.  Pt.'s sister was here. Her is back now and set up to eat supper.  Heparin was restarted before I came on and it continues. Pt. Able to make needs known.

## 2021-06-20 NOTE — PROGRESS NOTES
Cardiology Progress Note    Assessment:  Non-ST segment elevation myocardial infarction, chest pain-free  Coronary artery disease with history of remote coronary artery bypass graft surgery  Ischemic cardiomyopathy, mild, no significant fluid overload  Chronic kidney disease  Peripheral arterial disease    Plan:  Continue metoprolol and Imdur  Awaiting redo coronary artery bypass graft surgery later this week    Subjective:   Underwent coronary angiogram yesterday.  Was seen by CV surgeon who recommended  redo bypass surgery. Denies chest pains or shortness of breath     Objective:   /75 (Patient Position: Sitting)  Pulse 78  Temp 98.7  F (37.1  C) (Oral)   Resp 18  Ht 6' (1.829 m)  Wt 195 lb 3.2 oz (88.5 kg)  SpO2 95%  BMI 26.47 kg/m2    Intake/Output Summary (Last 24 hours) at 10/02/18 1124  Last data filed at 10/02/18 0600   Gross per 24 hour   Intake              742 ml   Output              500 ml   Net              242 ml         Physical Exam:  GENERAL: no distress  NECK: No JVD  LUNGS: Few crackles right base  CARDIAC: regular  rhythm, S1 & S2 normal.  No heaves, thrills, gallops or murmurs.  ABDOMEN: flat, negative hepatosplenomegaly, soft and non-tender.  EXTREMITIES: No evidence of cyanosis, clubbing or edema.    Current Facility-Administered Medications   Medication Dose Route Frequency Provider Last Rate Last Dose     acetaminophen tablet 500 mg (TYLENOL)  500 mg Oral Q4H PRN Miki Mac MD         acetaminophen tablet 650 mg (TYLENOL)  650 mg Oral Q4H PRN Derrick Andersen DO   650 mg at 09/29/18 2051     adenosine 90 mg/90 mL - Pressure Wire - Pt Wt < 120 kg  90 mg  Continuous PRN Miki Mac MD   Stopped at 10/01/18 1052     albuterol nebulizer solution 2.5 mg (PROVENTIL)  2.5 mg Nebulization Q6H PRN Alex Roche MD         calcium (as carbonate) chewable tablet 400 mg (TUMS)  400 mg Oral QID PRN Derrick Andersen DO         dextrose 50 % (D50W) syringe 20-50 mL  20-50 mL  Intravenous PRN Alex Roche MD         glipiZIDE (GLUCOTROL) tablet 15 mg  15 mg Oral Daily before supper Alex Roche MD   15 mg at 10/01/18 1753     glipiZIDE tablet 10 mg (GLUCOTROL)  10 mg Oral QAM AC Alex Roche MD   10 mg at 10/02/18 0842     glucagon (human recombinant) injection 1 mg  1 mg Subcutaneous PRN Alex Roche MD         heparin - BOLUS DOSE from infusion 2,650 Units  30 Units/kg Intravenous Once Ruben Nunez MD         heparin 25,000 units in 0.45% sodium chloride (100 units/ml) 250 mL infusion  1-40 Units/kg/hr Intravenous Continuous Derrick Andersen DO 13.6 mL/hr at 10/02/18 0848 15 Units/kg/hr at 10/02/18 0848     hydrALAZINE tablet 25 mg (APRESOLINE)  25 mg Oral Q4H PRN Derrick Andersen DO         influenza vacc high dose (age 65 or >) (PF) 2018-19 injection 0.5 mL (FLUZONE HIGH DOSE)  0.5 mL Intramuscular Prior to Discharge Karmen Petersen MD         insulin aspart U-100 injection (NovoLOG)   Subcutaneous TID with meals Derrick Andersen DO   2 Units at 10/01/18 1755     insulin aspart U-100 injection (NovoLOG)   Subcutaneous QHS Derrick Andersen DO   2 Units at 09/30/18 2131     isosorbide dinitrate tablet 20 mg (ISORDIL)  20 mg Oral TID dinitrates Scott Briggs MD   20 mg at 10/02/18 0842     loratadine tablet 10 mg (CLARITIN)  10 mg Oral DAILY Alex Roche MD   10 mg at 10/02/18 0842     LORazepam tablet 0.5 mg (ATIVAN)  0.5 mg Oral Once Karmen Meredith MD         melatonin tablet 3 mg  3 mg Oral Bedtime PRN Derrick Andersen DO   3 mg at 09/30/18 0002     metoprolol succinate 24 hr tablet 50 mg (TOPROL-XL)  50 mg Oral BID Alex Roche MD   50 mg at 10/02/18 0842     morphine injection 1-2 mg  1-2 mg Intravenous Q3H PRN Miki Mac MD         naloxone injection 0.2-0.4 mg (NARCAN)  0.2-0.4 mg Intravenous PRN Scott Briggs MD        Or     naloxone injection 0.2-0.4 mg (NARCAN)  0.2-0.4 mg Intramuscular PRN Scott Briggs MD         nitroglycerin SL  tablet 0.4 mg (NITROSTAT)  0.4 mg Sublingual Q5 Min PRN Yarely Chambers MD         nitroglycerin SL tablet 0.4 mg (NITROSTAT)  0.4 mg Sublingual Q5 Min PRN Alex Roche MD         omeprazole capsule 20 mg (PriLOSEC)  20 mg Oral DAILY Alex Roche MD   20 mg at 10/02/18 0842     ondansetron injection 4 mg (ZOFRAN)  4 mg Intravenous Q4H PRN Derrick Andersen DO         oxyCODONE immediate release tablet 5-10 mg (ROXICODONE)  5-10 mg Oral Q4H PRN Miki Mac MD         simvastatin tablet 40 mg (ZOCOR)  40 mg Oral QHS Alex Roche MD   40 mg at 10/01/18 2140     triamcinolone 0.1 % cream (KENALOG)   Topical BID LAZARO Kaur         vancomycin oral solution 125 mg (VANCOCIN)  125 mg Oral Every Other Day Derrick Andersen DO   125 mg at 10/02/18 0842       Cardiographics:    Telemetry: Normal sinus rhythm    Echo:    1.Left ventricle ejection fraction is mildly decreased. The estimated left ventricular ejection fraction is 45%.    2.More pronounced inferolateral hypokinesis noted.    3.TAPSE is abnormal, which is consistent with abnormal right ventricular systolic function.    4.Mild regurgitant lesions of all 4 cardiac valves seen.    5.Mild pulmonary hypertension suggested.    6.When compared to the previous study dated 1/25/2018, overall left ventricular ejection fraction is lower on current study, inferolateral hypokinesis appears to be a larger distribution.    Coronary angio:    Prox Cx to Mid Cx lesion 70% stenosed.    Dist Cx lesion 90% stenosed.    Ost 1st Mrg to 1st Mrg lesion 100% stenosed.    Dist LAD-1 lesion 50% stenosed.    Dist LAD-2 lesion 90% stenosed.    Mid LAD lesion 70% stenosed.    Pressure wire/FFR was performed on the lesion. pre diagnositic: 0.82. post diagnostic: 0.65.    Pressure wire/FFR was performed on the lesion.    Prox RCA to Dist RCA lesion 99% stenosed.    Dist Graft lesion 100% stenosed.    Origin to Dist Graft lesion 100% stenosed.  Lab Results:     Results from  last 7 days  Lab Units 10/02/18  0307   LN-SODIUM mmol/L 138   LN-POTASSIUM mmol/L 4.4   LN-CHLORIDE mmol/L 107   LN-CO2 mmol/L 21*   LN-BLOOD UREA NITROGEN mg/dL 17   LN-CREATININE mg/dL 1.34*   LN-CALCIUM mg/dL 9.4       Results from last 7 days  Lab Units 10/02/18  0307 10/01/18  0502 09/29/18  0906 09/29/18  0059   LN-WHITE BLOOD CELL COUNT thou/uL 6.8  --  7.6 8.5   LN-HEMOGLOBIN g/dL 11.7* 12.4* 13.4* 12.9*   LN-HEMATOCRIT % 36.0*  --  41.1 40.1   LN-PLATELET COUNT thou/uL 127* 123* 144 139*     BNP   Date Value Ref Range Status   09/29/2018 40 0 - 72 pg/mL Final     Lab Results   Component Value Date    INR 1.37 (H) 10/01/2018    INR 1.73 (H) 09/29/2018    INR 1.62 (H) 09/29/2018     Lab Results   Component Value Date    CKMB 1 10/25/2013    TROPONINI 2.45 (HH) 09/30/2018    TROPONINI 1.40 () 09/29/2018    TROPONINI 0.47 () 09/29/2018       Bobby Lemus MD ([place])

## 2021-06-20 NOTE — PROGRESS NOTES
Speech Language/Pathology    Speech Therapy Daily Progress Note     Patient presents as awake, confused and cooperative with reinforcement during this session.  An  was not applicable. No family present.     Objective  Dysphagia Therapy:  Note - Upon arrival of SLP and Aide, patient was found to have melting ice chips and was impulsively putting the liquid/ice chips in his mouth via cup.  He was observed to have wet vocal quality.  Took ice chips away and instead brought nectar thick liquids to have via spoonful.  Provided education to patient about nectar-thick liquids and he did not verbalize or indicate understanding - he reported frustration with ice chip cup being taken away.  Aide and RN are aware patient is on nectar-thick liquids and will make sure that ice isn't presented to him.    Feeding of Nectar Thick: Wet vocal quality after having thin liquids/ice chips was cleared before presentation of nectar-thick.  Pt self fed himself spoonfuls of nectar thick with verbal cues to remain slower pacing. No overt s/s of aspiration with 4 oz of nectar-thick liquids.  Aide present in room for part of presentation and provided education on needing staff to monitor him and give liquids via spoonful.    Effortful Swallow: Pt was able to complete 10 effortful swallows given verbal cues throughout and initial model.  Utilized spoonful of nectar thick x2 to moisten mouth and increase initiation of swallow after spoonful.  Provided education to complete 10 in a row, 3 times a day but question understanding and ability to follow through with confusion.      Tongue Hold/Helen: Patient required several attempts and practice to be able to do with sticking out slightly.  Several models provided.  Mirror and intermittent spoonful of nectar thick to moisten mouth was utilized.  With time, patient was able to complete 10 successful swallows with tongue about 1/8 of inch out given visual/verbal cues.  Unable to stick out  tongue further and swallow successfully at this time, will need to keep working on to improve.  Provided education to complete 10 in a row, 3 times a day but question understanding and ability to follow through with confusion.      Thermal/Sour Stim: Able to perform 10 of these swallow exercises on patient with cold lemon juice on oral swab, did not attempt to teach today due to confusion.     Assessment  Pt had participation in dysphagia treatment, but has decreased cognition and suspect inability to follow through with any pharyngeal exercises on his own.      Plan/Recommendations  Continue introducing pharyngeal exercises and thermal/sour stim.  Hope that family will be present for training to help patient follow through with plan outside of therapy.     The  Care Plan has been reviewed and current plan remains appropriate.     25 dysphagia minutes     Addis Mitchell

## 2021-06-20 NOTE — PROGRESS NOTES
Progress Note    Assessment/Plan  The patient is 72yo M with CAD s/p CABG in 2000, recent C. Diff infection, Afib, DM2, PAD who presented with chest pain, found to have NSTEMI, MESHA.    NSTEMI  Multivessel CAD  Cardiology following  [facility] which showed occluded vein grafts  CV surgery following; plan for potential redo CABG this week  Preop CTA chest with reticulonodular infiltrates, underlying chronic disease - will consult Pulmonary to assist with further evaluation/mgmt  Carotid dopplers with severe R ICA stenosis; mgmt per CV surgery  To get vein mapping today - given claustrophobia/anxiety, will give one time ativan dose of 0.5mg PO  Cont metoprolol, statin, imdur, heparin gtt    MESHA  Unclear etiology, may have been due to diuretics  GFR improved with IVF, holding diuretics  IVF have been stopped, cont to hold diuretics  Will need to monitor Cr especially with recent dye with heart cath  Cr up to 1.34 today  Cont to avoid nephrotoxic agents    Recent C. Diff infection  Cont PO vanco taper    Afib  Cont heparin gtt  Warfarin on hold  Cont metoprolol    DM2  Currently getting home meds, SSI; will need to transition to insulin perioperatively    HLD  Cont statin    HTN  Cont imdur, metoprolol      VTE prophylaxis: heparin gtt  Disposition: TBD  Barrier to discharge: need for redo CABG  Full Code      Subjective  Pt having some anxiety currently about going to u/s because the room is very small and makes him feel claustrophobic. Denies chest pain, shortness of breath. Does report nonproductive cough.      Review of Systems   12-point review of systems negative except as noted above.    Objective    Physical Exam  /68 (Patient Position: Lying)  Pulse 77  Temp 97.8  F (36.6  C) (Oral)   Resp 18  Ht 6' (1.829 m)  Wt 195 lb 3.2 oz (88.5 kg)  SpO2 95%  BMI 26.47 kg/m2  Wt Readings from Last 1 Encounters:   10/02/18 0335 195 lb 3.2 oz (88.5 kg)   09/30/18 0448 198 lb 11.2 oz (90.1 kg)   09/29/18 0302 200 lb 1.6  oz (90.8 kg)   09/29/18 0010 198 lb (89.8 kg)     Weight change:     Intake/Output Summary (Last 24 hours) at 10/02/18 1154  Last data filed at 10/02/18 0600   Gross per 24 hour   Intake              742 ml   Output              500 ml   Net              242 ml     Gen: WDWN male lying in bed in NAD  HEENT: NC/AT, no scleral icterus, moist oral mucosa  Chest: good inspiratory effort, lungs CTAB, no wheezes/rhonchi/rales noted  CV: RRR, no M/R/G, 2+ radial pulses, trace peripheral edema  Abd: soft, ND  MSK: no cyanosis/clubbing, able to move all 4 exts  Skin: no rashes, warm, dry  Neuro: A&Ox3, CN 2-12 grossly intact  Psych: Nl mood/affect/judgment    Cultures:  None    Antibiotics:  None    Consultants:  Cardiology  CV Surgery  Pulmonary    Results    Recent Results (from the past 24 hour(s))   POCT Glucose   Result Value Ref Range    Glucose,  mg/dL   POCT Glucose   Result Value Ref Range    Glucose,  mg/dL   Anti-Xa Heparin Level   Result Value Ref Range    Anti-Xa Heparin Assay 0.16 (L) 0.30 - 0.70 IU/mL   POCT Glucose   Result Value Ref Range    Glucose,  mg/dL   Anti-Xa Heparin Level   Result Value Ref Range    Anti-Xa Heparin Assay 0.33 0.30 - 0.70 IU/mL   Basic Metabolic Panel   Result Value Ref Range    Sodium 138 136 - 145 mmol/L    Potassium 4.4 3.5 - 5.0 mmol/L    Chloride 107 98 - 107 mmol/L    CO2 21 (L) 22 - 31 mmol/L    Anion Gap, Calculation 10 5 - 18 mmol/L    Glucose 122 70 - 125 mg/dL    Calcium 9.4 8.5 - 10.5 mg/dL    BUN 17 8 - 28 mg/dL    Creatinine 1.34 (H) 0.70 - 1.30 mg/dL    GFR MDRD Af Amer >60 >60 mL/min/1.73m2    GFR MDRD Non Af Amer 52 (L) >60 mL/min/1.73m2   HM1 (CBC with Diff)   Result Value Ref Range    WBC 6.8 4.0 - 11.0 thou/uL    RBC 4.20 (L) 4.40 - 6.20 mill/uL    Hemoglobin 11.7 (L) 14.0 - 18.0 g/dL    Hematocrit 36.0 (L) 40.0 - 54.0 %    MCV 86 80 - 100 fL    MCH 27.9 27.0 - 34.0 pg    MCHC 32.5 32.0 - 36.0 g/dL    RDW 13.5 11.0 - 14.5 %    Platelets 127  (L) 140 - 440 thou/uL    MPV 10.3 8.5 - 12.5 fL    Neutrophils % 68 50 - 70 %    Lymphocytes % 17 (L) 20 - 40 %    Monocytes % 10 2 - 10 %    Eosinophils % 5 0 - 6 %    Basophils % 1 0 - 2 %    Neutrophils Absolute 4.6 2.0 - 7.7 thou/uL    Lymphocytes Absolute 1.1 0.8 - 4.4 thou/uL    Monocytes Absolute 0.7 0.0 - 0.9 thou/uL    Eosinophils Absolute 0.4 0.0 - 0.4 thou/uL    Basophils Absolute 0.1 0.0 - 0.2 thou/uL   POCT Glucose   Result Value Ref Range    Glucose,  mg/dL   Anti-Xa Heparin Level   Result Value Ref Range    Anti-Xa Heparin Assay 0.21 (L) 0.30 - 0.70 IU/mL   POCT Glucose   Result Value Ref Range    Glucose,  mg/dL       Ct Chest With Contrast    Result Date: 10/1/2018  CT CHEST W CONTRAST 10/1/2018 4:24 PM INDICATION: LIMA evaluation previous sternotomy, preoperative planning for CABG. TECHNIQUE: Routine chest. Dose reduction techniques were used. IV CONTRAST: Iohexol (Omni) 75 mL. COMPARISON: 01/25/2018. FINDINGS: LUNGS AND PLEURA: Scattered areas of reticulonodular infiltrate and airspace disease along with less prominent interstitial edema with chronic areas of peripheral interstitial disease with peripheral lung destruction and scattered areas of fibroatelectasis. Resolution of pleural effusions on prior. MEDIASTINUM: Left internal mammary artery unremarkable and patent as is the right internal mammary artery. Prior CABG. Coronary artery calcification. No mass or adenopathy. LIMITED UPPER ABDOMEN: Stable upper pole left renal cyst. Hepatic steatosis. MUSCULOSKELETAL: Degenerative disease.     CONCLUSION: 1.  New scattered areas of reticulonodular infiltrate likely infectious or inflammatory. Underlying chronic lung disease as on prior although partially obscured on prior due to the pulmonary edema pattern on the previous study. Pulmonary consult would be  helpful. Follow-up recommended given multiple tiny nodules. 2.  Left and right internal mammary arteries patent and unremarkable.  3.  Hepatic steatosis.    Us Carotid Bilateral    Result Date: 10/1/2018  US CAROTID BILATERAL 10/1/2018 5:15 PM INDICATION: Redo CABG. TECHNIQUE: Duplex exam performed utilizing 2D gray-scale imaging, Doppler interrogation with color-flow and spectral waveform analysis. COMPARISON: None. FINDINGS: RIGHT: There is severe shadowing, irregular atheromatous plaque in the carotid bulb/proximal internal carotid artery. Severe right internal carotid artery stenosis with spectral broadening and delayed systolic upstroke. LEFT: There is mild atheromatous plaque in the carotid bulb/proximal internal carotid artery. Normal waveforms with no significant stenosis. Both vertebral arteries and subclavian artery waveforms are normal. VELOCITY CHART: The following velocities were obtained in the RIGHT carotid system. CCA: 54/9 cm/s ICA: 299/115 cm/s ECA: 149/14 cm/s ICA/CCA: PS 5.53 The following velocities were obtained in the LEFT carotid system. CCA: 48/9 cm/s ICA: 103/35 cm/s ECA: 79/43 cm/s ICA/CCA: PS 2.14                            CONCLUSION: 1.  Severe, shadowing, irregular atheromatous plaque in the right carotid bulb/proximal internal carotid artery. Mild left carotid bulb/proximal internal carotid artery plaque. 2.  Severe right internal carotid artery stenosis (70-99%). 3.  No significant left internal carotid artery stenosis. Evaluation based on velocities and NASCET criteria.      Pertinent labs/xray/ekg reviewed.        10/2/2018  Karemn Meredith MD  [facility] Medicine Service  Pager [phone]

## 2021-06-20 NOTE — CONSULTS
Clinical Nutrition Therapy Assessment Note      Reason for Assessment:   Ever Crane is a 73 y.o. male assessed by the RD for consult and protocol/pathway order.    Principal Problem:    Non-STEMI (non-ST elevated myocardial infarction) (H)  Active Problems:    Dyslipidemia, goal LDL below 70    Persistent atrial fibrillation (H)    Non-insulin dependent type 2 diabetes mellitus (H)    Coronary artery disease due to lipid rich plaque    PAD (peripheral artery disease) (H)    MESHA (acute kidney injury) (H)    Benign essential hypertension    C. difficile colitis    Abnormal CT scan, chest    Carotid stenosis, asymptomatic, right    S/P CABG x 3    Subjective:  Pt resting in bed.  He c/o itchy back.  Chart reviewed.    Nutrition History:  Information from patient and chart.  Diet prior to admission:  General, diabetic. Pt lives at assisted living.  Food allergies or intolerances: [facility]    Nutrition Prescription:   Diet: NPO day 2.  IV dextrose or Fluids:  dexmedetomidine 200 mcg/50 mL in NS (PRECEDEX) (4mcg/mL) Last Rate: Stopped (10/05/18 0800)   DOPamine    epinephrine Last Rate: 0.02 mcg/kg/min (10/05/18 1440)   insulin infusion (1 unit/mL) Last Rate: 2.8 Units/hr (10/05/18 1509)   niCARdipine    norepinephrine IV infusion in D5W Last Rate: Stopped (10/05/18 1407)   vasopressin        Current Nutrition Intake:  Pt was eating cardiac, diabetic diet earlier in admission.  NPO since 10/4 for surgery.  NPO today, speech therapist saw pt and recommends VFSS.  Nutrition needs not currently met.    Anthropometrics:  Height: 6' (182.9 cm)  Admission weight: 200#  Weight: 207 lb 3.2 oz (94 kg)  BMI (Calculated): 27.1  BMI indication: 25-29.9 overweight  Ideal body weight 178#+-10%  Weight History:    Wt Readings from Last 3 Encounters:   10/05/18 207 lb 3.2 oz (94 kg)   09/01/18 202 lb 1.6 oz (91.7 kg)   08/12/18 193 lb 12.8 oz (87.9 kg)     Physical Findings:  Not found     GI Status/Output:   GI symptoms per nursing:    Last BM recorded: n/a    Skin/Wound:   Clint Scale Score: 13    Medications:  Lasix    Labs:  Lab Results   Component Value Date    HGBA1C 7.2 (H) 08/12/2018   Labs reviewed    Estimated Nutrition Needs:  Using ideal weight of 81 kg.    Energy Needs: 8222-2462 kcals daily per 25-30 kcal/kg   Protein Needs: 97 g daily, 1.2 g/kg.    Malnutrition: Not noted    Nutrition Risk Level: moderate risk    Nutrition DX: Nutrition knowledge deficit r/t new DX evidenced by need for therapeutic diet    Goals:  Participate in diet ed    Education needs:  Cardiac, diabetic diet    Plan:  Diet ed before d/c    Monitor:   Diet start, education readiness    See Care Plan for Problems, Goals, and Interventions.

## 2021-06-20 NOTE — PROGRESS NOTES
Critical Care Progress Note  10/5/2018   10:04 AM    Admit Date: 9/29/2018  ICU Day: 6  Code Status: full code      Problem List:   Principal Problem:    Non-STEMI (non-ST elevated myocardial infarction) (H)  Active Problems:    Dyslipidemia, goal LDL below 70    Persistent atrial fibrillation (H)    Non-insulin dependent type 2 diabetes mellitus (H)    Coronary artery disease due to lipid rich plaque    PAD (peripheral artery disease) (H)    MESHA (acute kidney injury) (H)    Benign essential hypertension    C. difficile colitis    Abnormal CT scan, chest    Carotid stenosis, asymptomatic, right          Plan:   1. Post extubation ABG with a mild metabolic acidosis. Will give a dose of Sodium Bicarb to help correct.   2. Increase activity as tolerates.   3. BP control per CV-surgery. Of note, he does have significant carotid stenosis and lower pressures may effect his mentation/neuro exam. Likely benefit from SBP > 100.     4. Aggressive pulmonary toilet with intentional cough, deep breathing, mobilization.   5. Rhythm management per CV-surgery.     ICU Prophylaxis   Feeding: Clears and advance as tolerates.   Dahl: Yes - needed for strict I/O  Analgesia/Sedation: PRN  DVT prophylaxis: SubQ Heparin. Resume warfarin per CV-surgery  HOB > 30 degrees: yes   GI Proph: PPI   Glycemic Control: RHI gtt   Family updated: none available.     Dispo: ICU today - still on pressors. Management is per CV-surgery. Our service will sign off.     Karen Kaufman, [facility] Pulmonary/Critical Care    _______________________________________________________________    HPI: Ever Crane is a 73 y.o. male with PMH CAD s/p CABG in 2000, afib, DM2, PAD s/p left foot amputaion. Presented 9/29 with NSTEMI and MESHA. Angiogram showed occluded vein grafts and was taken to OR on 10/4 for CABG x 3 with Dr. Meade. Additionally, this admission CT Chest found scattered reticular nodular infiltrates; seen by Dr. MAAME Hopkins and felt not to be  infectious and was cleared for surgery from Pulmonary standpoint.   Surgery today was reported as uncomplicated. Preop EF was 45%; intraop EF was similar to slightly improved. EBL 600mL; he did not require any additional blood product other than Cell Saver. He came off pump without difficulty; on a touch of Epi for pressure control. Heart rhythm was bradycardic/asystole and he did require pacing via his epicardial wires. He arrived to ICU intubated and sedated.       ROS: Feeling fine - pain is controlled, no nausea, breathing is easy. He has no complaints for me this morning     Events over last 24-hours: extubated this AM.     Objective:     Vitals:    10/05/18 0845 10/05/18 0856 10/05/18 0900 10/05/18 0915   BP:       Patient Position:       Pulse: 87  88 89   Resp: 27  18 16   Temp:       TempSrc:       SpO2: 90% 95% 96%    Weight:       Height:           Vent:   Day of Intubation: 10/4-5  Ready to wean? extubated this AM  Vent Mode: CPAP/PSV  FiO2 (%):  [40 %-100 %] 40 %  S RR:  [16] 16  S VT:  [600 mL] 600 mL  PEEP/CPAP (cm H2O):  [5 cm H2O] 5 cm H2O  Minute Ventilation (L/min):  [8.4 L/min-15.1 L/min] 8.4 L/min  PIP:  [7 cm H2O-49 cm H2O] 11 cm H2O  OH SUP:  [5 cm H20] 5 cm H20  MAP (cm H2O):  [5-17] 7      I/O:   Intake/Output Summary (Last 24 hours) at 10/05/18 1004  Last data filed at 10/05/18 0700   Gross per 24 hour   Intake           5924.4 ml   Output             2483 ml   Net           3441.4 ml     Wt Readings from Last 3 Encounters:   10/05/18 207 lb 3.2 oz (94 kg)   09/01/18 202 lb 1.6 oz (91.7 kg)   08/12/18 193 lb 12.8 oz (87.9 kg)      Weight change:     Physical Exam:  Gen: sleepy but arousable; no distress.   HEENMT:PERRLA, extra ocular movement intact and sclera clear, anicteric  NEURO: grossly non-focal  CARDIOVASCULAR: 100% Paced; S1S2; loud friction rub remains. Chest Tubes x 3 with serosanguinous output.   PULMONARY: unlabored on NC; lungs are coarse bilaterally - difficult to fully  assess due to loud pericardial friction rub.   GASTROINTESTINAL: soft, nontender or nondistended  INTEGUMENT: Skin color, texture, turgor normal. No rashes or lesions.     Labs:   Results from last 7 days  Lab Units 10/05/18  0504  09/29/18  0059   LN-SODIUM mmol/L 143  < > 136   LN-POTASSIUM mmol/L 4.5  < > 5.2*   LN-CHLORIDE mmol/L 114*  < > 106   LN-CO2 mmol/L 20*  < > 18*   LN-BLOOD UREA NITROGEN mg/dL 20  < > 27   LN-CREATININE mg/dL 1.55*  < > 1.57*   LN-CALCIUM mg/dL 8.7  < > 9.5   LN-PROTEIN TOTAL g/dL  --   --  7.6   LN-BILIRUBIN TOTAL mg/dL  --   --  1.0   LN-ALKALINE PHOSPHATASE U/L  --   --  103   LN-ALT (SGPT) U/L  --   --  12   LN-AST (SGOT) U/L  --   --  17   < > = values in this interval not displayed.      Results from last 7 days  Lab Units 10/05/18  0504   LN-WHITE BLOOD CELL COUNT thou/uL 9.4   LN-HEMOGLOBIN g/dL 8.8*   LN-HEMATOCRIT % 27.6*   LN-PLATELET COUNT thou/uL 109*       Micro: none    Imaging: all imaging personalized reviewed   10/5 CT Head - 1.  Mildly motion degraded exam.  2.  No definite evidence of acute intracranial hemorrhage or mass effect.  3.  Mild to moderate nonspecific white matter changes.  4.  Moderate brain parenchymal volume loss.     10/5 CXR - Lines and support tubes are stable in position. Patient is status post median sternotomy and CABG with stable heart size. Lung volumes have decreased somewhat in the interval, with increasing interstitial opacities compatible with edema.. There   are persistent bilateral effusions.      Karen Kaufman, [facility] Pulmonary/Critical Care

## 2021-06-20 NOTE — ANESTHESIA CARE TRANSFER NOTE
Last vitals:   Vitals:    10/04/18 1546   BP: 129/67   Pulse: 80   Resp: 14   Temp:    SpO2: 100%     Patient's level of consciousness is unresponsive  Spontaneous respirations: no  Maintains airway independently: no  Dentition unchanged: yes  Oropharynx: oropharynx clear of all foreign objects and endotracheal tube in place   Temp 97.5    QCDR Measures:  ASA# 20 - Surgical Safety Checklist: WHO surgical safety checklist completed prior to induction  PQRS# 430 - Adult PONV Prevention: 4558F - Pt received => 2 anti-emetic agents (different classes) preop & intraop  ASA# 8 - Peds PONV Prevention: NA - Not pediatric patient, not GA or 2 or more risk factors NOT present  PQRS# 424 - Vida-op Temp Management: 4559F - At least one body temp DOCUMENTED => 35.5C or 95.9F within required timeframe  PQRS# 426 - PACU Transfer Protocol:NA - Patient did not go to PACU  ASA# 14 - Acute Post-op Pain: ASA14B - Patient did NOT experience pain >= 7 out of 10   1522:  To 5100 via O2, ambu, controlled ventilation, monitors and gtts.  Placed on vent upon arrival, settings per RT.  , FiO2 100, RR14, PEEP 5cm H2O.  1531:  Report to RN.

## 2021-06-20 NOTE — PROGRESS NOTES
Progress Note    Assessment/Plan  The patient is 72yo M with CAD s/p CABG in 2000, recent C. Diff infection, Afib, DM2, PAD who presented with chest pain, found to have NSTEMI, MESHA.    NSTEMI  Cardiology following  Went for [facility] today which showed occluded vein grafts  Plan for CV surgery consult for potential redo CABG  Cont metoprolol, statin, imdur, heparin gtt    MESHA  Unclear etiology, may have been due to diuretics  GFR improved with IVF, holding diuretics  IVF have been stopped, cont to hold diuretics  Will need to monitor Cr especially with recent dye with heart cath    Recent C. Diff infection  Cont PO vanco taper    Afib  Cont heparin gtt  Warfarin on hold  Cont metoprolol    DM2  Currently getting home meds, SSI; will need to transition to insulin for surgery    HLD  Cont statin    HTN  Cont imdur, metoprolol      VTE prophylaxis: heparin gtt  Disposition: TBD  Barrier to discharge: need for redo CABG  Full Code      Subjective  Pt reports no chest pain, shortness of breath, nausea today. Tolerated heart cath well. Just waiting for timing of surgery.      Review of Systems   12-point review of systems negative except as noted above.    Objective    Physical Exam  /65  Pulse 70  Temp 98.6  F (37  C) (Oral)   Resp 16  Ht 6' (1.829 m)  Wt 198 lb 11.2 oz (90.1 kg)  SpO2 92%  BMI 26.95 kg/m2  Wt Readings from Last 1 Encounters:   09/30/18 0448 198 lb 11.2 oz (90.1 kg)   09/29/18 0302 200 lb 1.6 oz (90.8 kg)   09/29/18 0010 198 lb (89.8 kg)     Weight change:     Intake/Output Summary (Last 24 hours) at 10/01/18 1537  Last data filed at 10/01/18 1300   Gross per 24 hour   Intake              480 ml   Output              450 ml   Net               30 ml     Gen: WDWN male lying in bed in NAD  HEENT: NC/AT, n scleral icterus, moist oral mucosa  Chest: good inspiratory effort, lungs CTAB  CV: RRR, no M/R/G, 2+ radial pulses, trace peripheral edema  Abd: soft, ND  MSK: no cyanosis/clubbing, able to move  all 4 exts  Skin: no rashes, warm, dry  Neuro: A&Ox3, CN 2-12 grossly intact  Psych: Nl mood/affect/judgment    Cultures:  None    Antibiotics:  None    Consultants:  Cardiology  CV Surgery    Results    Recent Results (from the past 24 hour(s))   POCT Glucose   Result Value Ref Range    Glucose,  mg/dL   POCT Glucose   Result Value Ref Range    Glucose,  mg/dL   Anti-Xa Heparin Level   Result Value Ref Range    Anti-Xa Heparin Assay 0.22 (L) 0.30 - 0.70 IU/mL   Anti-Xa Heparin Level   Result Value Ref Range    Anti-Xa Heparin Assay 0.32 0.30 - 0.70 IU/mL   Platelet Count - DO NOT remove unless platelet count already ordered by other source   Result Value Ref Range    Platelets 123 (L) 140 - 440 thou/uL   Hemoglobin   Result Value Ref Range    Hemoglobin 12.4 (L) 14.0 - 18.0 g/dL   Protime-INR   Result Value Ref Range    INR 1.37 (H) 0.90 - 1.10   Basic Metabolic Panel   Result Value Ref Range    Sodium 137 136 - 145 mmol/L    Potassium 4.5 3.5 - 5.0 mmol/L    Chloride 104 98 - 107 mmol/L    CO2 25 22 - 31 mmol/L    Anion Gap, Calculation 8 5 - 18 mmol/L    Glucose 115 70 - 125 mg/dL    Calcium 9.7 8.5 - 10.5 mg/dL    BUN 15 8 - 28 mg/dL    Creatinine 1.13 0.70 - 1.30 mg/dL    GFR MDRD Af Amer >60 >60 mL/min/1.73m2    GFR MDRD Non Af Amer >60 >60 mL/min/1.73m2   POCT Glucose   Result Value Ref Range    Glucose,  mg/dL   POCT activated clotting time   Result Value Ref Range    Activated Clotting Time  (!) 105 - 167 seconds   Anti-Xa Heparin Level   Result Value Ref Range    Anti-Xa Heparin Assay 0.37 0.30 - 0.70 IU/mL   POCT Glucose   Result Value Ref Range    Glucose,  mg/dL       No results found.    Pertinent labs/xray/ekg reviewed.        10/1/2018  Karmen Meredith MD  Hospital Medicine Service  Pager [phone]

## 2021-06-20 NOTE — CONSULTS
"      Cardiothoracic Surgery Consult    Date of Service: 10/1/2018    REFERRING CARDIOLOGIST: Dr. Mac    REASON FOR CONSULTATION: Coronary artery disease, previous CABG     HISTORY OF PRESENT ILLNESS: Mr. Ever Crane is a 73 y.o. male presenting with chest pain, NSTEMI, previous CABG.  He developed chest pain Friday afternoon after eating summer sausage.  He lives in assisted living.  Nursing there was unable to give him Sarah Linden.  He requested an ambulance ride to a \"place where he could get some Sarah Linden.\"  EMS brought him to [facility].    He had coronary angiogram today showing  of RCA, serial lesion in LAD, ostial COLUMBA lesion, OM lesion.    He is seen today in hospital.  He is lying flat in bed after his angiogram.         ASSESSMENT and PLAN:   1) Recommend redo CABG, bilateral PVI, LAAL  2) Check CTA chest to evaluate for redo sternotomy and patency of DANIELA vessels  3) Bilateral carotid ultrasound  4) Lower extremity vein mapping      PAST MEDICAL HISTORY:   Past Medical History:   Diagnosis Date     Atrial flutter (H)      Candidiasis of perineum 1/3/2018     Coronary artery disease due to lipid rich plaque 2000    CABG x2     Diabetic ulcer of both feet (H) 10/31/2017     Dyslexia      Dyslipidemia, goal LDL below 70 2000     Essential hypertension      Gangrene of left foot (H)      Neuropathy (H)      Paroxysmal Atrial Fibrillation     Brian Moe: 8/2011 Cardioversion; CHADS2 VASC = 5; he is on warfarin and sotalol      Type 2 diabetes mellitus, without long-term current use of insulin (H)      Unable to function independently 11/13/2017       PAST SURGICAL HISTORY:   Past Surgical History:   Procedure Laterality Date     CARDIOVERSION  8/25/2011     CORONARY ARTERY BYPASS GRAFT  3/6/2000    SVG to OM1, SVG to PDA     FOOT AMPUTATION Left 11/5/2017    Procedure: LEFT TRANSMETATARSAL AMPUTATION;  Surgeon: Ever Wick MD;  Location: [facility];  Service:      INGUINAL " HERNIA REPAIR Bilateral 1969 and 1979       ALLERGIES:   Allergies   Allergen Reactions     Latex      Penicillins Rash     Childhood rxn       CURRENT MEDICATIONS:  No current facility-administered medications on file prior to encounter.      Current Outpatient Prescriptions on File Prior to Encounter   Medication Sig Dispense Refill     acetaminophen (TYLENOL) 500 MG tablet Take 2 tablets (1,000 mg total) by mouth every 6 (six) hours as needed.  0     furosemide (LASIX) 20 MG tablet Take 20 mg by mouth daily as needed. If weight increases 2 lbs in 24 hours.       glipiZIDE (GLUCOTROL) 10 MG tablet Take 10 mg by mouth daily before breakfast.       glipiZIDE (GLUCOTROL) 10 MG tablet Take 15 mg by mouth every evening.       lisinopril (PRINIVIL,ZESTRIL) 5 MG tablet Take 5 mg by mouth daily.       loratadine (CLARITIN) 10 mg tablet Take 10 mg by mouth daily.       metoprolol succinate (TOPROL-XL) 50 MG 24 hr tablet Take 50 mg by mouth 2 (two) times a day.        min oil-petrolat (AQUAPHOR) ointment Apply 1 application topically 3 (three) times a day as needed. 396 g 11     nitroglycerin (NITROSTAT) 0.4 MG SL tablet Place 0.4 mg under the tongue every 5 (five) minutes as needed for chest pain.        omeprazole (PRILOSEC) 20 MG capsule Take 20 mg by mouth daily.        simvastatin (ZOCOR) 40 MG tablet Take 40 mg by mouth at bedtime.        triamcinolone (KENALOG) 0.1 % cream Apply to lower legs and hands two times a day for 10 days only - not to face 30 g 0     vancomycin (VANCOCIN) 125 MG capsule Take 1 tab PO QID for 11 days, then 1 tab BID x7 days, then 1 tab daily x7 days, then 1 tab QOD x14 days then stop. 72 capsule 0     albuterol (PROVENTIL) 2.5 mg /3 mL (0.083 %) nebulizer solution Take 3 mL (2.5 mg total) by nebulization every 6 (six) hours as needed for wheezing or shortness of breath.  0     Lactobacillus acidophilus 100 mg (1 billion cell) cap Take 1 capsule by mouth 2 (two) times a day. (Patient taking  "differently: Take 2 capsules by mouth 2 (two) times a day. ) 60 capsule 0       FAMILY HISTORY:   Family History   Problem Relation Age of Onset     Sudden death Mother 85     CABG Father      Valvular heart disease Father      Esophageal cancer Father 58     cause of death     No Medical Problems Son      No Medical Problems Grandchild      No Medical Problems Grandchild      The family history was reviewed and is not pertinent to the patient's disease/illness    SOCIAL HISTORY:   Social History     Social History     Marital status:      Spouse name: N/A     Number of children: 1     Years of education: N/A     Occupational History      Self Employed     he states he is still working despite being in [facility] in 2018     Social History Main Topics     Smoking status: Former Smoker     Packs/day: 1.00     Years: 36.00     Types: Cigarettes     Start date: 6/13/1964     Quit date: 4/30/2000     Smokeless tobacco: Never Used     Alcohol use 3.0 oz/week     2 Glasses of wine, 3 Cans of beer per week     Drug use: No     Sexual activity: Not Currently     Partners: Female     Other Topics Concern     Not on file     Social History Narrative    Ever lived with his son Raciel in member, 2017, but then he went to [facility] after foot amputation.  Ever states he will move to a facility in [place] in June, 2018. The [facility] papers say he uses the \"blue ride\" but Ever states he has his own vehicle on the campus and is still doing plumbing work in the spring 2018.  Our reception staff at [facility] in [place] confirmed on June 13, 2018 that Ever brought himself to the campus and did not utilize a family member or ride service.  I would also note that he states his granddaughter is name Claire and not Leonela, so I corrected that in the chart (ROSALIA Moe MD).           REVIEW OF SYSTEMS:  Review of Systems - A complete 10 point review of systems was obtained and is negative other than the " above stated complaints    PHYSICAL EXAMINATION:   Vitals: /73  Pulse 69  Temp 98.6  F (37  C) (Oral)   Resp 16  Ht 6' (1.829 m)  Wt 198 lb 11.2 oz (90.1 kg)  SpO2 92%  BMI 26.95 kg/m2  GENERAL:  Well developed and well nourished  HEENT: Atraumatic, normocephalic, pupils equal and reactive, CN 2-12 intact  NECK: Supple, trachea midline, no thyromegaly, no lymphadenopathy  CARDIOVASCULAR: RRR  RESPIRATIONS: CTAB  ABDOMEN: Soft, not tender, not distended  EXTREMITIES: Motor, sensation, pulses intact  NEUROLOGIC: intact and symmetric with no focal deficits  PSYCHIATRIC: alert and oriented x3, pleasant    LABORATORY STUDIES:   Lab Results   Component Value Date    WBC 7.6 09/29/2018    HGB 12.4 (L) 10/01/2018    HCT 41.1 09/29/2018    MCV 86 09/29/2018     (L) 10/01/2018      Lab Results   Component Value Date    CREATININE 1.13 10/01/2018    BUN 15 10/01/2018     10/01/2018    K 4.5 10/01/2018     10/01/2018    CO2 25 10/01/2018     Lab Results   Component Value Date    HGBA1C 7.2 (H) 08/12/2018       EKG(9/29/18):   Atrial flutter with variable A-V block   Nonspecific ST abnormality   Abnormal ECG   When compared with ECG of 25-JAN-2018 11:10,   No significant change was found   Confirmed by LISA DÍAZ, Ben Bolt LOC: ([id number]) on 9/29/2018 3:10:38 PM   CARDIAC CATHETERIZATION (10/1/18):   This study was personally reviewed by me  Conclusion     Estimated blood loss was <20 ml.    Prox Cx to Mid Cx lesion 70% stenosed.    Dist Cx lesion 90% stenosed.    Ost 1st Mrg to 1st Mrg lesion 100% stenosed.    Dist LAD-1 lesion 50% stenosed.    Dist LAD-2 lesion 90% stenosed.    Mid LAD lesion 70% stenosed.    Pressure wire/FFR was performed on the lesion. pre diagnositic: 0.82. post diagnostic: 0.65.    Pressure wire/FFR was performed on the lesion.    Prox RCA to Dist RCA lesion 99% stenosed.    Dist Graft lesion 100% stenosed.    Origin to Dist Graft lesion 100% stenosed.         TRANSTHORACIC  ECHOCARDIOGRAM (9/30/18):  This study was personally reviewed by me  Summary     1.Left ventricle ejection fraction is mildly decreased. The estimated left ventricular ejection fraction is 45%.    2.More pronounced inferolateral hypokinesis noted.    3.TAPSE is abnormal, which is consistent with abnormal right ventricular systolic function.    4.Mild regurgitant lesions of all 4 cardiac valves seen.    5.Mild pulmonary hypertension suggested.    6.When compared to the previous study dated 1/25/2018, overall left ventricular ejection fraction is lower on current study, inferolateral hypokinesis appears to be a larger distribution.         STS RISK CALCULATION:  Risk Model and Variables - STS Adult Cardiac Surgery Database Version 2.81  RISK SCORES  About the STS Risk Calculator  Procedure: MV Replacement Only   Risk of Mortality: 1.992%   Morbidity or Mortality: 18.285%   Long Length of Stay: 7.773%   Short Length of Stay: 20.062%   Permanent Stroke: 1.602%   Prolonged Ventilation: 10.592%   DSW Infection: 0.217%   Renal Failure: 2.932%   Reoperation: 8.553%         Thank you very much for this referral.     John Meade 10/1/2018 4:41 PM  Cardiothoracic Surgery  Pager: [phone]

## 2021-06-20 NOTE — PROGRESS NOTES
Patient evaluated throughout the night for readiness to wean. Per nursing, alternating between periods of agitation with tube biting causing desats, not following commands, and somnolence. On multiple 5/5 wean attempts became tachypnic and agitated, dropped blood pressures, and had to placed back on resting vent settings.    On my additional assessments tonight, has been somnolent and not breathing over vent rate. Repeat blood gas on resting vent settings for ?alkalemia given lack of spontaneous breaths - 7.31/37/88/19. No vent changes made. HCO3 given. Post-op CXR reviewed - small b/l effusions w atelectasis. Repeat CXR ordered for this am. Asked nursing and RT to attempt wean again this am without holding precedex (assuming he is awake enough) to see if that helps with agitation.        Shiela Troncoso MD

## 2021-06-20 NOTE — PROGRESS NOTES
Brief [facility] Progress Note    Pt seen and examined post-op. He remains intubated, sedated with pacing at 90 currently. Pressors are in the process of being weaned off and sedation is being decreased. Pt is s/p redo CABG x 3. Had R ICA stenosis evaluated by Vascular Surgery who agreed with proceeding to CABG with no need for surgical intervention on the R ICA at this time. Cr 1.15 which is improved. Will follow peripherally while intubated in the ICU.    Dr. Vania Meredith

## 2021-06-20 NOTE — PROGRESS NOTES
[facility] [place]       ASSESSMENT:    Principal Problem:    Chest pain  Active Problems:    Dyslipidemia, goal LDL below 70    Persistent atrial fibrillation (H)    Non-insulin dependent type 2 diabetes mellitus (H)    Coronary artery disease due to lipid rich plaque    PAD (peripheral artery disease) (H)    MESHA (acute kidney injury) (H)    Benign essential hypertension    C. difficile colitis    Non-STEMI (non-ST elevated myocardial infarction) (H)      PLAN:   73-year-old Male with history of CAD s/p CABG in 2000, recent C.diff infection, A. Fib, DM2 and PAD is admitted with chest pain and found to have NSTEMI and MESHA    NSTEMI  -- continue IV heparin, plan angiogram in AM  -- continue metoprolol and statin     MESHA: unclear etiology, question due to diuretics. GFR improved with IVF and holding diuretics  -- stop additional hydration, trend GFR, continue to hold diuretics for now    H/O recent C. Diff infection: continue oral vancomycin taper     H/O A. fib: inr subtherapeutic  -- IV heparin as above   -- continue metoprolol   -- hold warfarin while on heparin     Dm2: continue home medications and SSI     HLP: continue statin     HTN: BP management per cards       DVT PPX: on iv heparin    Needs for Discharge: slow clinical improvement and testing/consultation     ESTIMATED DISCHARGE:  1-2D back to [facility] D.O.  [phone]            -------------------------------------------------------------------------------------------------------------  SUBJECTIVE: NAD. Denies any nausea, vomiting, abdominal pain, chest pain, SOB, CASTANO, orthopnea, new swelling, fevers, chills, confusion or headache.     Exam:  /55 (Patient Position: Sitting)  Pulse 70  Temp 98.4  F (36.9  C) (Oral)   Resp 18  Ht 6' (1.829 m)  Wt 198 lb 11.2 oz (90.1 kg)  SpO2 95%  BMI 26.95 kg/m2  General: NAD  RESPIRATORY: CTAB  CARDIOVASCULAR: RRR without appreciable murmur, gallop, or rub. No le edema  bilat.   ABDOMEN: soft and non-tender   NEUROLOGIC: Motor and sensory intact, speech clear  PSYCHIATRIC: Oriented X 3, without confusion     Diagnostics Reviewed:      Recent Results (from the past 24 hour(s))   Anti-Xa Heparin Level    Collection Time: 09/29/18  3:45 PM   Result Value Ref Range    Anti-Xa Heparin Assay 0.31 0.30 - 0.70 IU/mL   POCT Glucose    Collection Time: 09/29/18  4:41 PM   Result Value Ref Range    Glucose,  mg/dL   POCT Glucose    Collection Time: 09/29/18  7:51 PM   Result Value Ref Range    Glucose,  mg/dL   Anti-Xa Heparin Level    Collection Time: 09/29/18 11:01 PM   Result Value Ref Range    Anti-Xa Heparin Assay 0.22 (L) 0.30 - 0.70 IU/mL   Basic Metabolic Panel    Collection Time: 09/30/18  5:28 AM   Result Value Ref Range    Sodium 135 (L) 136 - 145 mmol/L    Potassium 4.6 3.5 - 5.0 mmol/L    Chloride 104 98 - 107 mmol/L    CO2 23 22 - 31 mmol/L    Anion Gap, Calculation 8 5 - 18 mmol/L    Glucose 243 (H) 70 - 125 mg/dL    Calcium 9.2 8.5 - 10.5 mg/dL    BUN 21 8 - 28 mg/dL    Creatinine 1.20 0.70 - 1.30 mg/dL    GFR MDRD Af Amer >60 >60 mL/min/1.73m2    GFR MDRD Non Af Amer 59 (L) >60 mL/min/1.73m2   Troponin I    Collection Time: 09/30/18  5:28 AM   Result Value Ref Range    Troponin I 2.45 (HH) 0.00 - 0.29 ng/mL   Anti-Xa Heparin Level    Collection Time: 09/30/18  7:45 AM   Result Value Ref Range    Anti-Xa Heparin Assay 0.25 (L) 0.30 - 0.70 IU/mL   POCT Glucose    Collection Time: 09/30/18  8:23 AM   Result Value Ref Range    Glucose,  mg/dL   Echo Complete    Collection Time: 09/30/18  9:06 AM   Result Value Ref Range    LV volume diastolic 85.9 62 - 150 cm3    LV volume systolic 39.6 21 - 61 cm3    IVSd 1.33 0.6 - 1.0 cm    LVIDd 5.14 4.2 - 5.8 cm    LVIDs 3.69 2.5 - 4.0 cm    LVOT diam 2 cm    LVOT mean gradient 2 mmHg    LVOT peak VTI 19.1 cm    LVOT mean leanne 57.5 cm/s    LVOT peak leanne 86.3 cm/s    LVOT peak gradient 3 mmHg    LV PWd 1.36 0.6 - 1.0 cm     MV E' lat leanne 13.4 cm/s    MV E' med leanne 6.71 cm/s    AO root 3.5 cm    AO ascending 3.4 cm    LA size 4.4 cm    LA length 5.4 cm    LA/AO root ratio 1.26 no units    MV decel time 137 ms    MV peak A leanne 50.2 cm/s    MV peak E leanne 117 cm/s    TR peak leanne 306 cm/s    LA area 2 21.1 cm2    LA area 1 20.6 cm2    BSA 2.15 m2    Hieght 72 in    Weight 3179.2 lbs    /69 mmHg    HR 60 bpm    IVS/PW ratio 1.0     TR peak gradent 37.5 mmHg    LV FS 28.2 28 - 44 %    Echo LVEF calculated 54 55 - 75 %    LA volume 68.4 mL    LV mass 287.0 g    MV E/A Ratio 2.3     LVOT area 3.14 cm2    LVOT SV 60.0 cm3    LV systolic volume index 18.4 11 - 31 cm3/m2    LV diastolic volume index 40.0 34 - 74 cm3/m2    LA volume index 31.8 mL/m2    LV mass index 133.5 g/m2    LV SVi 27.9 ml/m2    TAPSE 1.3 cm    MV med E/e' ratio 17.4     MV lat E/e' ratio 8.7     LV CO 3.6 l/min    LV Ci 1.7 l/min/m2    Height 72.0 in    Weight 199 lbs    MV Avg E/e' Ratio 11.6 cm/s    Echo LVEF Estimated 45 %   POCT Glucose    Collection Time: 09/30/18 12:27 PM   Result Value Ref Range    Glucose,  mg/dL   Anti-Xa Heparin Level    Collection Time: 09/30/18 12:34 PM   Result Value Ref Range    Anti-Xa Heparin Assay 0.24 (L) 0.30 - 0.70 IU/mL

## 2021-06-20 NOTE — PROGRESS NOTES
Education- offered pt. To watch the preparing for heart surgery video and he refused.  We can try again tomorrow.  He has been on phone calling family and relatives to tell them about surgery.

## 2021-06-20 NOTE — PROGRESS NOTES
CVTS Daily Progress Note   10/9/2018   POD#5 s/p redo sternotomy, CABGx3  Attending: Deshaun   LOS: 10 days     SUBJECTIVE/INTERVAL EVENTS:    No acute events overnight. Alert and oriented. Maintaining oxygen saturations on room air. Normotensive. Ambulating with therapy. Pain well controlled. +BM / flatus. Tolerating diet. UOP adequate. Chest tube output appropriate. Hgb 8.7, stable. Patient denies new chest pain, shortness of breath, abdominal pain, calf pain, nausea. Patient has no questions today.    OBJECTIVE:    Temp:  [97.9  F (36.6  C)-98.6  F (37  C)] 98.1  F (36.7  C)  Heart Rate:  [54-64] 59  Resp:  [16-24] 24  BP: ()/(51-84) 111/58  Wt Readings from Last 2 Encounters:   10/09/18 0624 196 lb (88.9 kg)   10/08/18 0628 198 lb 1.6 oz (89.9 kg)   10/07/18 0648 202 lb 9.6 oz (91.9 kg)   10/06/18 0431 202 lb (91.6 kg)   10/05/18 0600 207 lb 3.2 oz (94 kg)   10/03/18 0440 194 lb 12.8 oz (88.4 kg)   10/02/18 0335 195 lb 3.2 oz (88.5 kg)   09/30/18 0448 198 lb 11.2 oz (90.1 kg)   09/29/18 0302 200 lb 1.6 oz (90.8 kg)   09/29/18 0010 198 lb (89.8 kg)   09/01/18 0600 202 lb 1.6 oz (91.7 kg)   08/31/18 1813 198 lb (89.8 kg)   08/31/18 1422 190 lb (86.2 kg)       Current Medications:    Scheduled Meds:    acetaminophen  650 mg Oral Q6H     aspirin  81 mg Oral DAILY     bisacodyl  10 mg Rectal Once     docusate sodium  100 mg Oral BID     furosemide  20 mg Oral BID - diuretic     glipiZIDE  10 mg Oral QAM AC     heparin (PF)  5,000 Units Subcutaneous Q8H FIXED TIMES     insulin aspart (NovoLOG) injection   Subcutaneous TID with meals     insulin aspart (NovoLOG) injection   Subcutaneous QHS     lidocaine  1 patch Transdermal DAILY     melatonin  3 mg Oral QHS     metoprolol tartrate  12.5 mg Oral 0330, 1300, 1700 - CV Metoprolol     metoprolol tartrate  25 mg Oral BID     omeprazole  20 mg Oral Q24H     polyethylene glycol  17 g Oral DAILY     simvastatin  40 mg Oral QHS     spironolactone  25 mg Oral DAILY      warfarin - daily dose required   Other Med Consult or Protocol     warfarin  5 mg Oral Daily 1700     Continuous Infusions:  PRN Meds:.albumin human, albuterol, aluminum-magnesium hydroxide-simethicone, bisacodyl, bisacodyl, gentamicin, hydrocortisone, ipratropium-albuterol **AND** Nebulizer treatment intermittent, magnesium hydroxide, naloxone **OR** naloxone, ondansetron, sodium chloride, sodium phosphates 133 mL, traZODone    Cardiographics:    Telemetry monitoring demonstrates NSR with rates in the 50s per my personal review.    Imaging:    No results found.    Lab Results (most recent, reviewed):    Hemoglobin (g/dL)   Date Value   10/09/2018 8.7 (L)     WBC (thou/uL)   Date Value   10/09/2018 5.9     Potassium (mmol/L)   Date Value   10/09/2018 4.3   10/09/2018 4.3     Creatinine (mg/dL)   Date Value   10/09/2018 1.25     Hemoglobin A1c (%)   Date Value   08/12/2018 7.2 (H)     Magnesium (mg/dL)   Date Value   10/09/2018 2.2     Calcium (mg/dL)   Date Value   10/09/2018 9.9       I/O:    Intake/Output Summary (Last 24 hours) at 10/09/18 1220  Last data filed at 10/09/18 1139   Gross per 24 hour   Intake              950 ml   Output             1175 ml   Net             -225 ml       Physical Exam:    General: Patient seen up in chair. NAD. Conversant. Pleasant.   CV: RRR on monitor. 2+ peripheral pulses in all extremities. No edema.   Pulm: Non-labored effort on room air. Incision and previous chest tube sites C/D/I.  Abd: Soft, NT, ND  Ext: No pedal edema, SCDs in place, warm, distal pulses intact  Neuro: CNs grossly intact.      ASSESSMENT/PLAN:    Ever Crane is a 73 y.o. male with a history of previous CABG who is POD#5 s/p CABGx3.    Principal Problem:    Non-STEMI (non-ST elevated myocardial infarction) (H)  Active Problems:    Dyslipidemia, goal LDL below 70    Persistent atrial fibrillation (H)    Non-insulin dependent type 2 diabetes mellitus (H)    Coronary artery disease due to lipid rich plaque     PAD (peripheral artery disease) (H)    MESHA (acute kidney injury) (H)    Benign essential hypertension    C. difficile colitis    Abnormal CT scan, chest    Carotid stenosis, asymptomatic, right    S/P CABG x 3      NEURO:   - Scheduled Tylenol for pain  - Melatonin QHS    CV:   - Normotensive  - ASA 81mg  - Metoprolol 25mg two times a day   - Simvastatin 40mg daily  - Chest tubes removed 10/8  - Warfarin for atrial fibrillation; in NSR since surgery    PULM:   - Maintaining oxygen saturations on room air  - Encourage pulmonary toilet    FEN/GI:  - Continue electrolyte replacement protocol  - Diet: Cardiac, ADAT  - Bowel regimen    RENAL:  - Adequate UOP/hr. Continue to monitor.  - Cr 1.25 (baseline)  - Lasix 20mg two times a day oral and spironolactone 25mg daily    HEME:  - Acute blood loss anemia post-op.   - Hgb stable (8.7), no bleeding concerns. Hep SQ, [id number]  - Warfarin for atrial fibrillation; in NSR since surgery    ID:  - Vida op ppx complete, afebrile. No concerns for infection    ENDO:   - SSI  - PTA Glipizide    PPx:   - DVT: SCDs, SQ heparin TID, ambulation   - GI: Omeprazole    DISPO:   - General telemetry floor; likely discharge to TCU tomorrow       Patient discussed with Dr. Meade.      _______  Margaux Fierro PA-C  Cardiothoracic Surgery  [phone]

## 2021-06-20 NOTE — OP NOTE
DATE OF SERVICE: 10/4/2018     PREOPERATIVE DIAGNOSES:  1. Severe multivessel coronary artery disease.  2. Dyslipidemia  3. Persistent atrial fibrillation  4. Polyneuropathy  5. Type 2 diabetes mellitus  6. Lymphedema  7. Peripheral arterial disease  8. HFPEF  9. Acute kidney injury  10. Hypertension  11. Open wound left foot  12. Community acquired pneumonia  13. Cellulitis  14. Acute conjunctivitis left eye  15. C. Difficile colitis  16. Left carotid stenosis    POSTOPERATIVE DIAGNOSES:  1.  Severe multivessel coronary artery disease.  2. Dyslipidemia  3. Persistent atrial fibrillation  4. Polyneuropathy  5. Type 2 diabetes mellitus  6. Lymphedema  7. Peripheral arterial disease  8. HFPEF  9. Acute kidney injury  10. Hypertension  11. Open wound left foot  12. Community acquired pneumonia  13. Cellulitis  14. Acute conjunctivitis left eye  15. C. Difficile colitis  16. Left carotid stenosis    PROCEDURE PERFORMED:  1.  Coronary artery bypass grafting x  3   - reversed saphenous vein graft to the left posterolateral coronary artery   - reversed saphenous vein graft to the obtuse marginal branch of the left circumflex coronary artery    - pedicled left internal mammary artery to left anterior descending coronary artery  2.  Endoscopic vein harvest of the greater saphenous vein from the left lower extremity.  3. Transesophageal echocardiogram  4. Epiaortic ultrasound  5. Aborted bilateral pulmonary vein isolation and left atrial appendage ligation  6. Redo sternotomy     SURGEON:  John Meade MD     FIRST ASSISTANT:  Heladio Marshall MD **a second surgeon was necessary given the complexity of this redo case**    ANESTHESIA:  General endotracheal anesthesia.     ANESTHESIOLOGIST:  Geraldine Naidu MD     ESTIMATED BLOOD LOSS:  500 mL     SPECIMEN:  None.    CARDIOPULMONARY BYPASS TIME: 139 minutes.    AORTIC CROSS CLAMP TIME: 102 minutes.     INDICATIONS FOR PROCEDURE:    Mr. Crane is a 73-year-old man who  presents with NSTEMI, coronary artery disease and prior CABG.  Coronary angiography demonstrates severe multivessel coronary artery disease. Echocardiography demonstrates preserved left ventricular function and no significant valvular abnormality. The patient understands the risks and benefits of the procedure and wishes to undergo the operation.     OPERATIVE FINDINGS:    1) The left internal mammary artery was 3 mm in diameter and had excellent flow.    2) The greater saphenous vein from the left lower extremity was 4-5 mm in diameter and suitable for conduit.    3) The ascending aorta was free of calcified plaque.    4) The left posterolateral coronary artery was 1.5 mm in diameter and free of disease at the site of anastomosis.   5) The obtuse marginal branch of the left circumflex coronary artery was 1.5 mm in diameter and free of disease at the site of anastomosis.     6) The left anterior descending coronary artery 2 mm in diameter and free of disease at the site of anastomosis.    7) After reperfusion and sinus rhythm resumed.    8) Left ventricular function was 60% preoperatively and unchanged after bypass on low-dose inotropic support.     OPERATIVE DESCRIPTION IN DETAIL:  After obtaining informed consent, the patient was brought to the operating room and placed in the supine position on the operating room table.  Appropriate lines and devices for monitoring were placed by the anesthesia team.  The patient underwent smooth induction of general anesthesia, and the trachea was intubated orally.  A right internal jugular Cordis introducer was  placed, and a Melrose-Lorena catheter was placed through this.  The patient was prepped and draped, and a timeout was performed to confirm the correct patient identity, as well as the procedure to be performed.  A median sternotomy was performed and the left internal mammary artery was harvested.  The greater saphenous vein was harvested from the left lower extremity using  endoscopic vein harvesting.     The patient was given full dose heparin, and after cannulation of the distal ascending aorta and the right atrium, cardiopulmonary bypass was commenced.  Antegrade and retrograde cardioplegia cannulae were placed, and the heart was arrested with 1L of cold antegrade blood cardioplegia.  Subsequent maintenance doses of 300 mL of cold retrograde blood cardioplegia were given approximately every  20 minutes or after each distal anastomosis.  Topical cold saline slush was applied for additional protection.     The bilateral pulmonary vein ablation and left atrial appendage ligation was aborted given the extensive scarring of the pulmonary veins and atrial appendage.    The following grafts were constructed in end-to-side fashion using running 7-0  Prolene:   - A reversed saphenous vein graft was sewn to the proximal left posterolateral coronary artery  - A reversed saphenous vein graft was sewn to the mid obtuse marginal branch of the left circumflex coronary artery    The pedicled left internal mammary artery was sewn to the mid left anterior descending coronary artery in end-to-side fashion using running 8-0 Prolene.  The proximal anastomoses of the 2 vein grafts were constructed on the ascending aorta in end-to-side fashion using running 6-0 Prolene under a single crossclamp.  The crossclamp was released, and the heart was resuscitated.       All bleeding points were controlled.  A bipolar ventricular pacing lead was placed and brought out through a separate stab incision. A bipolar right atrial pacing lead was placed and brought out through a separate stab incision.  The patient weaned from cardiopulmonary bypass, was given protamine and decannulated.  Two 32-Austrian chest tubes were placed in the anterior mediastinum and brought out through a separate stab incisions.  A 28-Austrian right angle chest tube was placed in the left pleural space and brought out through a separate stab  incision.  The sternal edges were reapposed with stainless steel sternal wires.  The muscle, fascia, and skin were closed in layers with absorbable suture.  Dermabond was applied.  All sponge, needle and instrument counts were correct at the end of the case.        NICK YOUNG MD

## 2021-06-20 NOTE — PROGRESS NOTES
Cardiac Rehab Discharge Summary    Problem List  Patient Active Problem List    Diagnosis Date Noted     S/P CABG x 3      Carotid stenosis, asymptomatic, right      Abnormal CT scan, chest      Non-STEMI (non-ST elevated myocardial infarction) (H)      Bilateral lower leg cellulitis 08/31/2018     Allergic conjunctivitis, left      C. difficile colitis      Acute conjunctivitis of left eye, unspecified acute conjunctivitis type      Cellulitis 08/11/2018     CAP (community acquired pneumonia) 07/02/2018     Wound, open, foot, left, sequela 06/20/2018     Medically noncompliant 06/20/2018     Benign essential hypertension      Heart failure with preserved ejection fraction, NYHA class II (H) 01/25/2018     MESHA (acute kidney injury) (H) 01/25/2018     PAD (peripheral artery disease) (H) 11/14/2017     Coronary artery disease due to lipid rich plaque      Acquired lymphedema of lower extremity 10/16/2017     Non-insulin dependent type 2 diabetes mellitus (H)      Venous hypertension, chronic, bilateral 08/10/2017     Polyneuropathy associated with underlying disease (H) 08/10/2017     Dyslipidemia, goal LDL below 70      Persistent atrial fibrillation (H)      Typical atrial flutter (H)        Pertinent Medical History  Pertinent Medical History: CABG, HTN, Hyperlipidemia, Diabetes (gangrene left foot with amputation)    Risk Factors  Risk Factors: Hypertension, High cholesterol, Stress, Inactivity, Diabetes Mellitus, Family History (father )    Living Situation  Living Accomodations: Alone, Apartment (elevator to congregate dining)    Vitals  Height: 6' (182.9 cm)     Weight: 191 lb 8 oz (86.9 kg)      Labs    Hemoglobin A1c   Date/Time Value Ref Range Status   08/12/2018 06:35 AM 7.2 (H) 4.2 - 6.1 % Final     Troponin I   Date/Time Value Ref Range Status   09/30/2018 05:28 AM 2.45 (HH) 0.00 - 0.29 ng/mL Final     BNP   Date/Time Value Ref Range Status   09/29/2018 12:59 AM 40 0 - 72 pg/mL Final     Lab Results    Component Value Date    CHOL 115 08/25/2017    CHOL 122 04/26/2016    CHOL 126 07/30/2015     Lab Results   Component Value Date    HDL 26 (L) 08/25/2017    HDL 33 (L) 04/26/2016    HDL 31 (L) 07/30/2015     Lab Results   Component Value Date    LDLCALC 63 08/25/2017    LDLCALC 62 04/26/2016    LDLCALC  07/30/2015      Comment:      Invalid, Triglycerides >300     Lab Results   Component Value Date    TRIG 130 08/25/2017    TRIG 135 04/26/2016    TRIG 407 (H) 07/30/2015         Treatment Progression: Maximal Activity and Exercise Level  Transfers  Bed Mobility: Assist of 1, Min A  Bed to Chair: Min A, Assist of 1  Chair to Stand: Assist of 1, Min A  Transfer Comments: Mod A of 2 commode transfer    Walking/Marching  Distance: 320 ft.   Time: stood 5 min   Met Level: 3  Peak HR: 88  Peak BP: 146/62  Ambulation Status: CGA  Device: Walker  Comment: room air    Tolerance Level/Symptoms/Complications  Symptomatic: weakness    EKG/Arrhythmias  ECG: Sinus Rhythm    Discharge Disposition  Discharge   Recommended Discharge Disposition: TCU  Outpatient Location: [facility]  Order Received On: 10/04/18  Referral Made For: 10/10/18  Comment: Instructed in sternal precautions and Phase I post op.  Patient does not recall much from CABG in 2000.

## 2021-06-20 NOTE — PROGRESS NOTES
RCAT was done. Pt scored acuity Level of 3. Pt is on 3L NC with an SpO2 94% .breathing in within the normal limit. Pt has a strong non-productive cough. Pt BS are Clear/ diminished throughout both lungs. CXR result showed mild atelectasis at the lung bases. Pt was given Duo-neb and instructed on Flutter valve. Pt pulled an IS value of 600 ml, RT will follow as needed.       10/07/18 0854   Reason for Assessment (RCAT)   RCAT Assesment Re-eval   Assessment Reason  Thoracic surgery   $ RCAT Eval Time 15 min.  Yes   Vitals (RCAT)   Heart Rate 64   Resp 18   SpO2 94 %   Chart Assessment (RCAT)   Pulmonary Status  2   Surgical Status  3   Chest Xray  2   Patient Assessment (RCAT)    Respiratory Pattern  0   Mental Status  0   Breath Sounds  2   Cough Effectiveness  0   Level of Activity  1   O2 Required SpO2 >= 92%  1   Chart + Pt. Assessment Total Points  11   RCAT Acuity Score 11 (Acuity 3)   Clinical Indications (RCAT)   Aerosol Hygiene RCAT protocol   Volume Expansion Therapy Prevent atelectasis   RCAT Order Placed  Yes     ROS Patino

## 2021-06-20 NOTE — CONSULTS
PULMONARY / CRITICAL CARE CONSULTATION NOTE      Consultation - Pulmonary/Critical Care Medicine  Ever Crane,  1945, MRN [id number]  Date / Time of Admission:  2018 12:08 AM    Admitting Dx: Atrial flutter (H) [I48.92]  Hyperkalemia [E87.5]  Acute chest pain [R07.9]  Non-STEMI (non-ST elevated myocardial infarction) (H) [I21.4]  Coronary artery disease due to lipid rich plaque [I25.10, I25.83]  CAD (coronary artery disease) [I25.10]    PCP: No Primary Care Provider, None  Consulting physician:  Karmen Meredith MD   Code status:  Full Code       Extended Emergency Contact Information  Primary Emergency Contact: Miriam Riggs  Address: [address] [place] [id number] [facility]  Home Phone: [phone]  Mobile Phone: [phone]  Relation: Sibling  Secondary Emergency Contact: Marie Tobias  Address: [address] [place] [id number] [facility]  Home Phone: [phone]  Mobile Phone: [phone]  Relation: Sibling       ID:  Ever Crane is a 73 y.o. male with PMH significant for CAD s/p CABG in , hypertension, a.fib, NIDDM, peripheral artery disease, s/p left foot amputation, carotid stenosis. Presented  with NSTEMI; angiogram found occluded vein grafts. Taken to OR 10/4 by Dr. Meade for CABG x 3.      ASSESSMENT   1. Coronary artery disease, severe multivessel.    2. S/p CABG with redo sternotomy.  Bypass grafting x 3  3. Respiratory failure, acute.  Secondary to general anesthesia; unable to extubate periop due to hemodynamic instability  4. Bradycardia.  Severe bradycardia/no underlying rhythm post procedure. Currently being paced at 80.   5. Acute blood loss anemia.  EBL 600mL.   6. Pulmonary infiltrates.  CT scan this admission showed scattered reticular nodular infiltrates; seen by Dr. MAAME Hopkins and felt not to be infectious and recommended follow up CT scan in 4-6 weeks. See his consult from 10/2 for further details.    7. Carotid stenosis.  Severe, asymptomatic on right. Seen by Vascular surgery and not felt to require surgery at this time  8. NIDDM.    9. Peripheral vascular disease.  S/p left foot amputation.   10. Hypertension, hx of.    11. Smoking history .  Quit in 2000  12. Afib.  Anticoagulated on warfarin.   13. MESHA.      Advance Directives:  <no information>     PLAN   Systems to Assess:     Pulmonary: Wean supplemental O2 as tolerated; goal O2 sat > 92%.  HOB > 30 degrees to limit aspiration risk.      Check ABG and adjust support as indicated; avoid acidotic state.     Check CXR    Once hemodynamically stable, plan to proceed to wake, wean, and extubate.     Add some scheduled nebs given his smoking history and CT findings from earlier.     Cardiovascular: Cardiac monitoring.     SBP goal > 90 mmHg, MAP goal > 65 mmHg.      Vasoactive gtts per CV-surgery.     Temporary pacing wires present; currently paced at 80.     Neurological: Neuro checks per ICU protocol.     Sedate with precedex until ready to wean and extubate.     Pain control: PRN    GI/:     NPO until extubated and fully awake.     Dahl catheter in place for accurate measurement of I/O.     GI prophylaxis:  Omeprazole    Renal: Monitor I/O's.  Electrolyte repletion PRN.  Avoid/limit nephrotoxic agents.     IVFs: per CV-surgery    Heme/Coag: Monitor H/H.     Transfuse per CV-surgery.     Monitor chest tube output hourly; notify CV-surgery for excessive output or abrupt stop in output.     DVT prophylaxis:  SubQ heparin    Infectious disease: General precautions.     Remove lines and drains once no longer required.     Endocrine:     RHI gtt per ICU protocol.     Musculoskeletal:     Bedrest; initiate mobility protocol.     Lines: A-line, Day# 1, Central line, Day# 1 or [place], Day# 1      Code Status:  FULL CODE    Thank you for this interesting consultation.  Please call if any questions or concerns.    The patient and/or the family were educated  about the above plan of care and indicated understanding.      This patient is considered critically ill and requires ICU level of care due to immediate post CV-surgical procedure. High risk for acute hemodynamic collapse and death.     Total Critical Care time, not including separate billable procedure time:  40 minutes.    Karen Kaufman, Critical access hospital Pulmonary/Critical Care      Chief Complaint Chief Complaint   Patient presents with     Chest Pain          HPI    Ever Crane is a 73 y.o. male with PMH CAD s/p CABG in 2000, afib, DM2, PAD s/p left foot amputaion. Presented 9/29 with NSTEMI and MESHA. Angiogram showed occluded vein grafts and was taken to OR on 10/4 for CABG x 3 with Dr. Meade. Additionally, this admission CT Chest found scattered reticular nodular infiltrates; seen by Dr. MAAME Hopikns and felt not to be infectious and was cleared for surgery from Pulmonary standpoint.   Surgery today was reported as uncomplicated. Preop EF was 45%; intraop EF was similar to slightly improved. EBL 600mL; he did not require any additional blood product other than Cell Saver. He came off pump without difficulty; on a touch of Epi for pressure control. Heart rhythm was bradycardic/asystole and he did require pacing via his epicardial wires. He arrived to ICU intubated and sedated.   Direct sign out received from Dr. Denise.          Review of Systems   Review of systems not obtained due to inability to communicate with the patient.      Active Problem List   Patient Active Problem List    Diagnosis Date Noted     Carotid stenosis, asymptomatic, right      Abnormal CT scan, chest      Non-STEMI (non-ST elevated myocardial infarction) (H)      Bilateral lower leg cellulitis 08/31/2018     Allergic conjunctivitis, left      C. difficile colitis      Acute conjunctivitis of left eye, unspecified acute conjunctivitis type      Cellulitis 08/11/2018     CAP (community acquired pneumonia) 07/02/2018     Wound,  open, foot, left, sequela 06/20/2018     Medically noncompliant 06/20/2018     Benign essential hypertension      Heart failure with preserved ejection fraction, NYHA class II (H) 01/25/2018     MESHA (acute kidney injury) (H) 01/25/2018     PAD (peripheral artery disease) (H) 11/14/2017     Coronary artery disease due to lipid rich plaque      Acquired lymphedema of lower extremity 10/16/2017     Non-insulin dependent type 2 diabetes mellitus (H)      Venous hypertension, chronic, bilateral 08/10/2017     Polyneuropathy associated with underlying disease (H) 08/10/2017     Dyslipidemia, goal LDL below 70      Persistent atrial fibrillation (H)      Typical atrial flutter (H)          Medical/Surgical History   Past Medical History:   Diagnosis Date     Atrial flutter (H)      Candidiasis of perineum 1/3/2018     Coronary artery disease due to lipid rich plaque 2000    CABG x2     Diabetic ulcer of both feet (H) 10/31/2017     Dyslexia      Dyslipidemia, goal LDL below 70 2000     Essential hypertension      Gangrene of left foot (H)      Neuropathy (H)      Paroxysmal Atrial Fibrillation     Moe Brian: 8/2011 Cardioversion; CHADS2 VASC = 5; he is on warfarin and sotalol      Type 2 diabetes mellitus, without long-term current use of insulin (H)      Unable to function independently 11/13/2017     Past Surgical History:   Procedure Laterality Date     CARDIOVERSION  8/25/2011     CORONARY ARTERY BYPASS GRAFT  3/6/2000    SVG to OM1, SVG to PDA     CV CORONARY ANGIOGRAM N/A 10/1/2018    Procedure: Coronary Angiogram;  Surgeon: Miki Mac MD;  Location: [facility];  Service:      FOOT AMPUTATION Left 11/5/2017    Procedure: LEFT TRANSMETATARSAL AMPUTATION;  Surgeon: Ever Wick MD;  Location: [facility] OR;  Service:      INGUINAL HERNIA REPAIR Bilateral 1969 and 1979         Allergies   Allergies   Allergen Reactions     Latex      Penicillins Rash     Childhood rxn         Medications:   OUTpatient medications   Prior to Admission medications    Medication Sig Start Date End Date Taking? Authorizing Provider   acetaminophen (TYLENOL) 500 MG tablet Take 2 tablets (1,000 mg total) by mouth every 6 (six) hours as needed. 11/9/17  Yes Ever Latham,    cetirizine (ZYRTEC) 10 MG tablet Take 10 mg by mouth daily.   Yes PROVIDER, HISTORICAL   furosemide (LASIX) 20 MG tablet Take 20 mg by mouth daily as needed. If weight increases 2 lbs in 24 hours.   Yes PROVIDER, HISTORICAL   furosemide (LASIX) 40 MG tablet Take 40 mg by mouth daily.   Yes PROVIDER, HISTORICAL   gentamicin (GARAMYCIN) 0.1 % ointment Apply 1 application topically daily as needed.   Yes PROVIDER, HISTORICAL   glipiZIDE (GLUCOTROL) 10 MG tablet Take 10 mg by mouth daily before breakfast.   Yes PROVIDER, HISTORICAL   glipiZIDE (GLUCOTROL) 10 MG tablet Take 15 mg by mouth every evening.   Yes PROVIDER, HISTORICAL   hydrocortisone 1 % cream Apply 1 application topically 2 (two) times a day as needed.   Yes PROVIDER, HISTORICAL   lisinopril (PRINIVIL,ZESTRIL) 5 MG tablet Take 5 mg by mouth daily.   Yes PROVIDER, HISTORICAL   loratadine (CLARITIN) 10 mg tablet Take 10 mg by mouth daily.   Yes PROVIDER, HISTORICAL   metoprolol succinate (TOPROL-XL) 50 MG 24 hr tablet Take 50 mg by mouth 2 (two) times a day.    Yes PROVIDER, HISTORICAL   miconazole (MICOTIN) 2 % powder Apply 1 application topically daily as needed for itching.   Yes PROVIDER, HISTORICAL   miconazole (SECURA EXTRA THICK) 2 % cream Apply 1 application topically 2 (two) times a day as needed.   Yes PROVIDER, HISTORICAL   miconazole nitrate-zinc ox-pet (VUSION) 0.25-15-81.35 % Oint Apply 1 application topically 2 (two) times a day as needed.   Yes PROVIDER, HISTORICAL   min oil-petrolat (AQUAPHOR) ointment Apply 1 application topically 3 (three) times a day as needed. 8/16/18  Yes Tabatha Beltran MD   nitroglycerin (NITROSTAT) 0.4 MG SL tablet Place 0.4 mg under the tongue every  5 (five) minutes as needed for chest pain.  3/1/17  Yes PROVIDER, HISTORICAL   omeprazole (PRILOSEC) 20 MG capsule Take 20 mg by mouth daily.    Yes PROVIDER, HISTORICAL   simvastatin (ZOCOR) 40 MG tablet Take 40 mg by mouth at bedtime.    Yes PROVIDER, HISTORICAL   spironolactone (ALDACTONE) 25 MG tablet Take 25 mg by mouth daily.   Yes PROVIDER, HISTORICAL   triamcinolone (KENALOG) 0.1 % cream Apply to lower legs and hands two times a day for 10 days only - not to face 9/2/18  Yes Tavares Mariee MD   urea 10 % lotion Apply 1 application topically daily as needed.   Yes PROVIDER, HISTORICAL   vancomycin (VANCOCIN) 125 MG capsule Take 1 tab PO QID for 11 days, then 1 tab BID x7 days, then 1 tab daily x7 days, then 1 tab QOD x14 days then stop. 8/16/18  Yes Tabatha Beltran MD   warfarin (COUMADIN/JANTOVEN) 4 MG tablet Take 4 mg by mouth 4 (four) times a week. warfarin 4 mg q Tues, Thurs, Sat, Sun(Warfarin 5 mg q Mon, Wed, Fri)   Yes PROVIDER, HISTORICAL   warfarin (COUMADIN/JANTOVEN) 5 MG tablet Take 5 mg by mouth 3 (three) times a week. Warfarin 5 mg q Mon, Wed, Fri(warfarin 4 mg q Tues, Thurs, Sat, Sun)   Yes PROVIDER, HISTORICAL   albuterol (PROVENTIL) 2.5 mg /3 mL (0.083 %) nebulizer solution Take 3 mL (2.5 mg total) by nebulization every 6 (six) hours as needed for wheezing or shortness of breath. 1/28/18   Derrick Andersen DO   Lactobacillus acidophilus 100 mg (1 billion cell) cap Take 1 capsule by mouth 2 (two) times a day.  Patient taking differently: Take 2 capsules by mouth 2 (two) times a day.  8/16/18   Tabatha Beltran MD           Medications:  INpatient medications     acetaminophen  650 mg Oral Q6H    Or     acetaminophen  650 mg Rectal Q6H     [START ON 10/5/2018] aspirin  81 mg Oral DAILY     [START ON 10/5/2018] bisacodyl  10 mg Oral Once     [START ON 10/7/2018] bisacodyl  10 mg Rectal Once     ceFAZolin (ANCEF) IV  2 g Intravenous Q8H     chlorhexidine  15 mL Topical Q12H     [START ON  "10/5/2018] docusate sodium  100 mg Oral BID     [START ON 10/5/2018] heparin (PF)  5,000 Units Subcutaneous Q8H FIXED TIMES     [START ON 10/6/2018] magnesium hydroxide  30 mL Oral DAILY     melatonin  3 mg Oral QHS     mupirocin  1 application Each Nare BID     [START ON 10/5/2018] omeprazole  20 mg Oral QAM AC     [START ON 10/5/2018] polyethylene glycol  17 g Oral DAILY           Family History  Social History     Reviewed  Family History   Problem Relation Age of Onset     Sudden death Mother 85     CABG Father      Valvular heart disease Father      Esophageal cancer Father 58     cause of death     No Medical Problems Son      No Medical Problems Grandchild      No Medical Problems Grandchild        Reviewed  Social History     Social History     Marital status:      Spouse name: N/A     Number of children: 1     Years of education: N/A     Occupational History      Self Employed     he states he is still working despite being in [facility] in 2018     Social History Main Topics     Smoking status: Former Smoker     Packs/day: 1.00     Years: 36.00     Types: Cigarettes     Start date: 6/13/1964     Quit date: 4/30/2000     Smokeless tobacco: Never Used     Alcohol use 3.0 oz/week     2 Glasses of wine, 3 Cans of beer per week     Drug use: No     Sexual activity: Not Currently     Partners: Female     Other Topics Concern     Not on file     Social History Narrative    Ever lived with [place] Raciel in [place], [place], but then he went to [facility] after foot amputation.  Ever states he will move to a facility in [place] in June, 2018. The [facility] papers say he uses the \"blue ride\" but Ever states he has his own vehicle on the campus and is still doing plumbing work in the spring 2018.  Our reception staff at [facility] in [place] confirmed on June 13, 2018 that Ever brought himself to the campus and did not utilize a family member or ride service.  I would also note that he states " "his granddaughter is name Claire and not Leonela, so I corrected that in the chart (ROSALIA Moe MD).          Psychosocial Needs  Social History     Social History Narrative    Ever lived with his son Raciel in [place], 2017, but then he went to [facility] after foot amputation.  Ever states he will move to a facility in [place] in June, 2018. The [facility] papers say he uses the \"blue ride\" but Ever states he has his own vehicle on the campus and is still doing plumbing work in the spring 2018.  Our reception staff at [facility] in [place] confirmed on June 13, 2018 that Ever brought himself to the campus and did not utilize a family member or ride service.  I would also note that he states his granddaughter is name Claire and not Leonela, so I corrected that in the chart (ROSALIA Moe MD).         Additional psychosocial needs reviewed per nursing assessment.      Physical Exam   VITALS:  /76 (Patient Position: Lying)  Pulse 88  Temp 98.4  F (36.9  C) (Oral)   Resp 18  Ht 6' (1.829 m)  Wt 194 lb 12.8 oz (88.4 kg)  SpO2 95%  BMI 26.42 kg/m2  Temp:  [98.4  F (36.9  C)-99.2  F (37.3  C)] 98.4  F (36.9  C)  Heart Rate:  [66-88] 88  Resp:  [18-20] 18  BP: (113-137)/(61-76) 137/76  SpO2:  [92 %-95 %] 95 %    PHYSICAL EXAM:   GEN: intubated and sedated.   HEENT:  OETT; pupils constricted and equal.   NECK: Supple.  RIJ introducer in place.   PULM:  Unlabored on full vent; lungs are coarse and equal.   CVS:   Paced at 80; loud friction rub; chest tubes x 3 in place with sanguinous output.   ABDOMEN:   Soft ntnd  EXTREMITIES/SKIN:  Left foot amputation - healed. Sternotomy is clean.   NEURO:  .  sedated    I&O:    Intake/Output Summary (Last 24 hours) at 10/04/18 1541  Last data filed at 10/04/18 1459   Gross per 24 hour   Intake             3670 ml   Output             1200 ml   Net             2470 ml        Pertinent Labs   Lab Results: personally reviewed.     Serum Glucose " range:  Recent Labs      10/04/18   1145  10/04/18   1219  10/04/18   1255  10/04/18   1341  10/04/18   1418  10/04/18   1455   POCGLU  154  171  176  179  143  121       No results for input(s): PHART, RHW5YNS, PO2ART, OXYHB, BEARTCALC, CARBOXYHGB, METHGB, POCPEEP, TEMP, 43087, POCRATE, POCFLOW, PSV in the last 72 hours.    Invalid input(s): EX26UEIMUDP, 02SAT, VENTTIVOL  CBC:    Results from last 7 days  Lab Units 10/04/18  1413 10/04/18  0419 10/03/18  0624 10/02/18  0307   LN-WHITE BLOOD CELL COUNT thou/uL  --  6.2 6.3 6.8   LN-HEMOGLOBIN g/dL  --  10.8* 10.9* 11.7*   LN-HEMATOCRIT %  --  33.5* 34.6* 36.0*   LN-PLATELET COUNT thou/uL 100* 122* 118* 127*   LN-NEUTROPHILS RELATIVE PERCENT %  --  68 67 68   LN-MONOCYTES RELATIVE PERCENT %  --  11* 11* 10     Chemistry:    Results from last 7 days  Lab Units 10/04/18  0419 10/03/18  0624 10/02/18  0307  09/29/18  0059   LN-SODIUM mmol/L 135* 140 138  < > 136   LN-POTASSIUM mmol/L 4.2  4.2 4.2 4.4  < > 5.2*   LN-CHLORIDE mmol/L 105 108* 107  < > 106   LN-CO2 mmol/L 22 23 21*  < > 18*   LN-BLOOD UREA NITROGEN mg/dL 20 20 17  < > 27   LN-CREATININE mg/dL 1.37* 1.39* 1.34*  < > 1.57*   LN-CALCIUM mg/dL 9.4 9.4 9.4  < > 9.5   LN-MAGNESIUM mg/dL  --  2.0  --   --   --    LN-ALBUMIN g/dL  --   --   --   --  3.7   LN-ALT (SGPT) U/L  --   --   --   --  12   LN-AST (SGOT) U/L  --   --   --   --  17   LN-ALKALINE PHOSPHATASE U/L  --   --   --   --  103   LN-BILIRUBIN TOTAL mg/dL  --   --   --   --  1.0   < > = values in this interval not displayed.  Coags:  Lab Results   Component Value Date    INR 1.64 (H) 10/04/2018    INR 1.37 (H) 10/01/2018    INR 1.73 (H) 09/29/2018     Cardiac Markers:    Results from last 7 days  Lab Units 09/30/18  0528 09/29/18  1249 09/29/18  0801   LN-TROPONIN I ng/mL 2.45* 1.40* 0.47*       Microbiology:    Negative MRSA     Cardiac/Radiology Studies       Radiology:    Chest X-Ray: post op film is pending

## 2021-06-20 NOTE — PROGRESS NOTES
Speech Language/Pathology  Bedside Swallow Evaluation    Problem:  Patient Active Problem List   Diagnosis     Dyslipidemia, goal LDL below 70     Persistent atrial fibrillation (H)     Typical atrial flutter (H)     Venous hypertension, chronic, bilateral     Polyneuropathy associated with underlying disease (H)     Non-insulin dependent type 2 diabetes mellitus (H)     Acquired lymphedema of lower extremity     Coronary artery disease due to lipid rich plaque     PAD (peripheral artery disease) (H)     Heart failure with preserved ejection fraction, NYHA class II (H)     MESHA (acute kidney injury) (H)     Benign essential hypertension     Wound, open, foot, left, sequela     Medically noncompliant     CAP (community acquired pneumonia)     Cellulitis     Acute conjunctivitis of left eye, unspecified acute conjunctivitis type     Allergic conjunctivitis, left     C. difficile colitis     Bilateral lower leg cellulitis     Non-STEMI (non-ST elevated myocardial infarction) (H)     Abnormal CT scan, chest     Carotid stenosis, asymptomatic, right     S/P CABG x 3       Onset date: 09/29/2018  Reason for evaluation: dysphagia  Pertinent History: 73 year old male admitted with NSTEMI and intubated. He was extubated this morning. Nursing reports, patient stated he had some coughing with liquids at home.   Current Diet: NPO   Baseline Diet: Reg/Thin- presumed    Patient presents as alert and cooperative, but significantly confused during this session.   An  was not applicable    Patient was given puree, honey and thin.    Dentition/Oral hygiene: Intact    Oral motor function was grossly intact but assessment limited by patient's cognition.     Bolus prep and oral control was mildly impaired. Mastication was not tested due to safety concerns (attention).    Anterior-Posterior transit was not impaired.    Oral stasis did not occur with any texture.    Pharyngeal Phase:    Puree: Patient trialed 4 bites and  presented with no s/s of aspiration. Initiation of swallow appeared mildly delayed. Hyolaryngeal movement appeared intact .    Honey: Patient trialed 2 ounces by spoon and cup and presented with immediate cough x1. Initiation of swallow appeared mildly delayed. Hyolaryngeal movement appeared intact .    Thin:Patient trialed 3 sips by straw and cup and presented with no overt s/s aspiration. He did have a largely delayed cough but unclear if related. Initiation of swallow appeared mildly delayed. Hyolaryngeal movement appeared intact .    Ice chips:Patient trialed 3 bites by spoon and presented with no s/s of aspiration. Initiation of swallow appeared timely. Hyolaryngeal movement appeared intact .    Assessment:    Patient presents with mild, vague and intermittent signs and symptoms of aspiration with honey thick liquids. Nursing reported coughing earlier with small sips of thin liquids.     Patient demonstrated mild oral and suspect pharyngeal dysphagia.    Rehab potential is good based on evaluation results and time post onset.    Nursing educated and agreed with plan.     Recommendations:    Plan: NPO except meds crushed in puree; okay for ice chips sparingly for oral mucosa integrity and comfort; Patient would benefit from a VFSS but appears premature today given cognitive status and current symptoms    Strategies: n/a    Speech therapy 4-6 times per week. Recommend VFSS.     Referrals: video swallow study anticipate tomorrow    15 dysphagia minutes       Ingrid Sloan MA, CCC-SLP

## 2021-06-20 NOTE — CONSULTS
I have seen and examined and interviewed the patient.  He is a 73-year-old male who will be undergoing a redo coronary artery bypass grafting tomorrow.  He has never had transient ischemic attack or stroke.  On 1 October he underwent a duplex sonography of his carotid with showed high-grade stenosis of the right internal carotid artery.  I requested a CT angiogram of the head and neck which was performed today and I have reviewed it myself.    His past medical history is notable for hypertension, heart failure, peripheral arterial disease, coronary artery disease, non-insulin-dependent diabetes mellitus, history of Clostridium difficile diarrhea and atrial fibrillation.    His past surgical history is positive for coronary artery bypass grafting which was done in the year 2000.  He is also had bilateral inguinal hernia repairs as well as transmetatarsal amputation of his left foot.    He quit smoking 18 years ago.    On examination he appears comfortable and is in no acute distress.  Vital signs are reviewed.  HEENT: Head is atraumatic normocephalic, mucosa pink.  Eyes: Extraocular motions are intact, sclerae are anicteric  Mental: Alert and oriented x4, judgment and insight are excellent.  Cardiac: Regular rate and rhythm, S1 plus S2 +0.  Vascular: 3+ bilateral radial and femoral pulses.    Imaging data: I reviewed the ultrasonography as well as a CT angiogram of the head and neck.  He has calcification of the right distal common carotid artery and an area of about 70% stenosis in the proximal internal carotid artery.  No significant stenosis of the left internal carotid artery.    Diagnosis: Severe, asymptomatic right carotid artery stenosis.    Plan: In light of his need for undergoing a coronary artery bypass grafting, the heart surgery should take precedence.  He does have a small risk of intraoperative cerebrovascular accident.  This is inherent to a coronary bypass procedure and there is a small added risk  due to the established right internal carotid artery stenosis.  I agree that he should proceed with his coronary bypass.  No need for surgical intervention in his right carotid stenosis at this time.  All of this was explained to the patient.  He verbalized understanding.

## 2021-06-20 NOTE — PROGRESS NOTES
Pharmacy Consult: Warfarin Management    Pharmacy consulted to dose warfarin for Ever Crane, a 73 y.o. male    Ordering provider: Margaux Fierro PA-C    Reason for warfarin therapy: Atrial Fibrillation  Goal INR Range: 2-3    Subjective  Medication lists (home and hospital) were reviewed.    New medications that may increase bleeding risk/INR: Heparin subQ, aspirin    Restarted home medications that may increase bleeding risk/INR: glipizide, omeprazole    Restarted home medications that may result in decreased warfarin effect: spironolactone    Home Warfarin Dosing: Warfarin 5 mg MWF; warfarin 4 mg Tues, Thurs, Sat and Sun    Other Anticoagulants: heparin SubQ  Patient being bridged: No    Patient Active Problem List   Diagnosis     Dyslipidemia, goal LDL below 70     Persistent atrial fibrillation (H)     Typical atrial flutter (H)     Venous hypertension, chronic, bilateral     Polyneuropathy associated with underlying disease (H)     Non-insulin dependent type 2 diabetes mellitus (H)     Acquired lymphedema of lower extremity     Coronary artery disease due to lipid rich plaque     PAD (peripheral artery disease) (H)     Heart failure with preserved ejection fraction, NYHA class II (H)     MESHA (acute kidney injury) (H)     Benign essential hypertension     Wound, open, foot, left, sequela     Medically noncompliant     CAP (community acquired pneumonia)     Cellulitis     Acute conjunctivitis of left eye, unspecified acute conjunctivitis type     Allergic conjunctivitis, left     C. difficile colitis     Bilateral lower leg cellulitis     Non-STEMI (non-ST elevated myocardial infarction) (H)     Abnormal CT scan, chest     Carotid stenosis, asymptomatic, right     S/P CABG x 3    Past Medical History:   Diagnosis Date     Atrial flutter (H)      Candidiasis of perineum 1/3/2018     Coronary artery disease due to lipid rich plaque 2000    CABG x2     Diabetic ulcer of both feet (H) 10/31/2017     Dyslexia       Dyslipidemia, goal LDL below 70 2000     Essential hypertension      Gangrene of left foot (H)      Neuropathy (H)      Paroxysmal Atrial Fibrillation     Brian Moe: 8/2011 Cardioversion; CHADS2 VASC = 5; he is on warfarin and sotalol      Type 2 diabetes mellitus, without long-term current use of insulin (H)      Unable to function independently 11/13/2017        Social History   Substance Use Topics     Smoking status: Former Smoker     Packs/day: 1.00     Years: 36.00     Types: Cigarettes     Start date: 6/13/1964     Quit date: 4/30/2000     Smokeless tobacco: Never Used     Alcohol use 3.0 oz/week     2 Glasses of wine, 3 Cans of beer per week       Objective   Labs:  Last 3 days:    Recent Labs      10/06/18   0253  10/06/18   0458  10/07/18   0708  10/08/18   0356   CREATININE  1.38*   --   1.37*  1.45*   HGB  8.8*   --   8.9*  8.2*   HCT  27.9*   --   29.4*  27.0*   PLT  103*  103*  124*  117*     Last 7 days:   Recent labs: (last 7 days)      10/04/18   1413  10/04/18   1544  10/05/18   0504  10/08/18   1226   INR  1.64*  1.45*  1.48*  1.21*       Warfarin Dosing History:    Date INR Warfarin Dose Comment   10/8 1.21 5 mg                        Assessment  The patient is resuming warfarin for the indication of Atrial Fibrillation with a goal INR of 2-3. INR today is subtherapeutic. Warfarin has been on hold since admission on 9/29.     Plan  1. Administer warfarin 5 mg PO today.  2. Check INR daily or as appropriate.  3. Continue to follow the patient's INR, PLT, and HGB as available.  4. Monitor for potential drug/disease interactions.    Thank you for the consult,  Angelito Retana, PharmD 10/8/2018 1:04 PM

## 2021-06-20 NOTE — DISCHARGE SUMMARY
Cardiovascular Surgery Discharge Summary    Primary Care Physician:  No Primary Care Provider  Discharge Provider: Margaux Fierro   Admission Date: 9/29/2018  Admission Diagnoses: Atrial flutter (H) [I48.92]  Hyperkalemia [E87.5]  Acute chest pain [R07.9]  Non-STEMI (non-ST elevated myocardial infarction) (H) [I21.4]  Coronary artery disease due to lipid rich plaque [I25.10, I25.83]  CAD (coronary artery disease) [I25.10]  Discharge Date: 10/10/2018  Disposition: TCU  Condition at Discharge: Good  Code Status: Full Code     Principal Diagnosis:   Non-STEMI (non-ST elevated myocardial infarction) (H) s/p CABGx3    Discharge Diagnoses:    Principal Problem:    Non-STEMI (non-ST elevated myocardial infarction) (H)  Active Problems:      Patient Active Problem List   Diagnosis     Dyslipidemia, goal LDL below 70     Persistent atrial fibrillation (H)     Typical atrial flutter (H)     Venous hypertension, chronic, bilateral     Polyneuropathy associated with underlying disease (H)     Non-insulin dependent type 2 diabetes mellitus (H)     Acquired lymphedema of lower extremity     Coronary artery disease due to lipid rich plaque     PAD (peripheral artery disease) (H)     Heart failure with preserved ejection fraction, NYHA class II (H)     MESHA (acute kidney injury) (H)     Benign essential hypertension     Wound, open, foot, left, sequela     Medically noncompliant     CAP (community acquired pneumonia)     Cellulitis     Acute conjunctivitis of left eye, unspecified acute conjunctivitis type     Allergic conjunctivitis, left     C. difficile colitis     Bilateral lower leg cellulitis     Non-STEMI (non-ST elevated myocardial infarction) (H)     Abnormal CT scan, chest     Carotid stenosis, asymptomatic, right     S/P CABG x 3         Consult/s: Dietary, critical care medicine, vascular surgery    Surgery:   1.  Coronary artery bypass grafting x  3                         - reversed saphenous vein graft to the left  posterolateral coronary artery                         - reversed saphenous vein graft to the obtuse marginal branch of the left circumflex coronary artery                                         - pedicled left internal mammary artery to left anterior descending coronary artery  2.  Endoscopic vein harvest of the greater saphenous vein from the left lower extremity.  3. Transesophageal echocardiogram  4. Epiaortic ultrasound  5. Aborted bilateral pulmonary vein isolation and left atrial appendage ligation      Discharge Medications:      Medication List      START taking these medications          aluminum-magnesium hydroxide-simethicone 200-200-20 mg/5 mL Susp   Commonly known as:  MAALOX ADVANCED   Take 30 mL by mouth every 4 (four) hours as needed (gastrointestinal upset).       aspirin 81 mg chewable tablet   Chew 1 tablet (81 mg total) daily.   Start taking on:  10/11/2018       bisacodyl 5 mg EC tablet   Commonly known as:  DULCOLAX   Take 2 tablets (10 mg total) by mouth daily as needed for constipation.       metoprolol tartrate 25 MG tablet   Commonly known as:  LOPRESSOR   Take 1 tablet (25 mg total) by mouth 2 (two) times a day.       NovoLOG U-100 Insulin aspart 100 unit/mL injection   Generic drug:  insulin aspart U-100   Check blood sugar four (4) times daily. 11.9 Type 2 without complications         CHANGE how you take these medications          acetaminophen 325 MG tablet   Commonly known as:  TYLENOL   Take 2 tablets (650 mg total) by mouth every 6 (six) hours as needed for pain.   What changed:    - medication strength  - how much to take  - reasons to take this       Lactobacillus acidophilus 100 mg (1 billion cell) Cap   Take 1 capsule by mouth 2 (two) times a day.   What changed:  how much to take       warfarin 5 MG tablet   Commonly known as:  COUMADIN/JANTOVEN   Take one 5mg tablet tonight and have labs rechecked and coumadin dosed based on INR   What changed:    - how much to take  - how  to take this  - when to take this  - additional instructions  - Another medication with the same name was removed. Continue taking this medication, and follow the directions you see here.         CONTINUE taking these medications          albuterol 2.5 mg /3 mL (0.083 %) nebulizer solution   Commonly known as:  PROVENTIL   Take 3 mL (2.5 mg total) by nebulization every 6 (six) hours as needed for wheezing or shortness of breath.       cetirizine 10 MG tablet   Commonly known as:  ZyrTEC   Take 10 mg by mouth daily.       * furosemide 40 MG tablet   Commonly known as:  LASIX   Take 40 mg by mouth daily.       * furosemide 20 MG tablet   Commonly known as:  LASIX   Take 20 mg by mouth daily as needed. If weight increases 2 lbs in 24 hours.       gentamicin 0.1 % ointment   Commonly known as:  GARAMYCIN   Apply 1 application topically daily as needed.       * glipiZIDE 10 MG tablet   Commonly known as:  GLUCOTROL   Take 10 mg by mouth daily before breakfast.       * glipiZIDE 10 MG tablet   Commonly known as:  GLUCOTROL   Take 15 mg by mouth every evening.       hydrocortisone 1 % cream   Apply 1 application topically 2 (two) times a day as needed.       loratadine 10 mg tablet   Commonly known as:  CLARITIN   Take 10 mg by mouth daily.       * miconazole 2 % powder   Commonly known as:  MICOTIN   Apply 1 application topically daily as needed for itching.       * miconazole 2 % cream   Commonly known as:  SECURA EXTRA THICK   Apply 1 application topically 2 (two) times a day as needed.       min oil-petrolat ointment   Commonly known as:  AQUAPHOR   Apply 1 application topically 3 (three) times a day as needed.       nitroglycerin 0.4 MG SL tablet   Commonly known as:  NITROSTAT   Place 0.4 mg under the tongue every 5 (five) minutes as needed for chest pain.       omeprazole 20 MG capsule   Commonly known as:  PriLOSEC   Take 20 mg by mouth daily.       simvastatin 40 MG tablet   Commonly known as:  ZOCOR   Take 40 mg  by mouth at bedtime.       spironolactone 25 MG tablet   Commonly known as:  ALDACTONE   Take 25 mg by mouth daily.       triamcinolone 0.1 % cream   Commonly known as:  KENALOG   Apply to lower legs and hands two times a day for 10 days only - not to face       urea 10 % lotion   Apply 1 application topically daily as needed.       VUSION 0.25-15-81.35 % Oint   Generic drug:  miconazole nitrate-zinc ox-pet   Apply 1 application topically 2 (two) times a day as needed.       * Notice:  This list has 6 medication(s) that are the same as other medications prescribed for you. Read the directions carefully, and ask your doctor or other care provider to review them with you.      STOP taking these medications          lisinopril 5 MG tablet   Commonly known as:  PRINIVIL,ZESTRIL       metoprolol succinate 50 MG 24 hr tablet   Commonly known as:  TOPROL-XL       vancomycin 125 MG capsule   Commonly known as:  VANCOCIN             Discharge Instructions:    Follow up appointment with Primary Care Physician: No Primary Care Provider within 7 days of discharge from TCU.  Follow up appointment with Specialist:    Follow with CV Surgery as scheduled.   Follow up with atrial fibrillation clinic as scheduled.    Follow-up with cardiology as scheduled    Diet: Cardiac    Activity/Restrictions: As tolerated with sternal precautions in mind (see below). No driving for 4 weeks or while on pain medication.     - Shower and wash your incisions daily with soap and water. No tub baths/hot tubs for 4 weeks. An antibacterial soap such as Dial or Safeguard is recommended.    - Check your incisions every day. If you notice any redness, drainage, or anything unusual, please call the surgeons office.    - No driving for 4 weeks after surgery or while on pain medication     - Do not lift anything more than 10 pounds for 6 weeks after surgery. After 6 weeks, advance lifting is tolerated.    - You may have watery drainage from your chest tube  "site for 2-3 weeks after surgery. Your may cover with a Band-Aid to protect your clothing. Remove the Band-Aid every day and wash the site.    - If you have a leg lesion, you may have swelling for 2-3 months. Elevate your leg any time you are not walking.    - If you feel any \"popping\" or \"clicking\" sensations in your chest, your arms are out too far or you are putting too much weight into arm movements. Do not reach over your head or out to the side to pull something. Do not do any arm exercises or use any exercise equipment that involves arm movement. If you feel your sternum moving, call the surgeon's office.    - Increase your daily activity as explained by Cardiac Rehab. You are encouraged to enroll in an Outpatient Cardiac Rehab Program.    - No active sports using your upper arms for 3 months. This includes fishing, hunting, bowling, swimming, tennis or golf.    - No physical activity such as cutting the grass, raking, vacuuming, changing sheets on your bed, snow shoveling, or using a  for 3 months.    - Use incentive spirometer 6-8 times per day for 2 weeks.       Hospital Summary:   Ever Crane is a 73 y.o. male who was admitted to [facility] on 9/29/2018 following ED presentation for chest pain and subsequent diagnosis of NSTEMI. He has history of previous CABG, atrial fibrillation, CKD, HLD, HTN, peripheral arterial disease, and DMII. Coronary angiogram demonstrated non-patent grafts and severe coronary artery disease. He was referred to CV surgery for evaluation for possible coronary artery revascularization.     Patient was deemed a candidate for coronary artery bypass surgery, and was taken to the operating room on 10/04/2018 where patient underwent three-vessel coronary artery bypass and endoscopic vein harvest from the lft leg. Surgery was uneventful and patient was brought to the ICU post-operatively. was extubated on POD#1 and weaned from pressors. Patient was awake and alert, " afebrile, and with stable vitals. Insulin drip was discontinued and he was transitioned to a sliding scale. He was transferred to the general telemetry floor on POD#2 where patient has had return of bowel function, is maintaining oxygen saturations on 0-2L oxygen, had his chest tubes removed, and has no complaints of chest pain or shortness of breath. On 10/10/18, patient was stable enough to be discharged to TCU.    Of note, patient had atrial fibrillation pre-op but has not experienced any episodes post operatively. He remains on Coumadin with atrial fibrillation clinic follow up. INR is not therapeutic at discharge, but again, patient has been in NSR. Goal INR 2-3 until seen by atrial fibrillation clinic.    He has been evaluated by speech pathology and requires nectar think liquids at this point.      Patient also will need follow up for high grade stenosis of right internal carotid artery; will see vascular surgery in ~3 months.    Vital Signs in last 24 hours:    Temp:  [98.1  F (36.7  C)-98.6  F (37  C)] 98.3  F (36.8  C)  Heart Rate:  [59-66] 61  Resp:  [16-24] 17  BP: (111-125)/(55-62) 113/55  SpO2:  [84 %-97 %] 94 %    Physical Exam:    Pertinent exam findings on day of discharge include:  Gen: Seen up in chair. NAD. Pleasant and conversant.   CV: RRR on monitor. No edema.  Pulm: Non-labored breathing on 1L NC.  Abd: Soft, non-tender, non-distended  Neuro: CNs grossly intact    _______  Margaux Fierro PA-C  Cardiothoracic Surgery  [phone]

## 2021-06-20 NOTE — ANESTHESIA PROCEDURE NOTES
BENJAMÍN    Patient location during procedure: OR  Start time: 10/4/2018 8:51 AM  Staffing:  Performing  Anesthesiologist: YUE COLUNGA  BENJAMÍN:  Type/Reason: Monitoring BENJAMÍN    Difficulty: easy  Anesthesia Monitoring: see additional note

## 2021-06-20 NOTE — PROGRESS NOTES
Education- brought heart book in room and tried to get pt. To look over book.  I discussed  Getting heart pillow and how to get up with a regular pillow.   As night comes pt. Is obviously more restless.  Complains about wheels on IV pole so changed it out for him to different pole.  Pt. Is up moving about room with his Heparin running, has learned to move IV pole with him.

## 2021-06-20 NOTE — ANESTHESIA POSTPROCEDURE EVALUATION
Patient: Ever Crane  REDO STERNOTOMY, CORONARY ARTERY BYPASS X3, ENDOSCOPIC VEIN HARVEST, INTERNAL MAMMARY ARTERY, ANESTHESIA TRANSESOPHAGEAL ECHOCARDIOGRAM AND EPI AORTIC ULTRASOUND  Anesthesia type: general    Patient location: ICU  Last vitals:   Vitals:    10/04/18 1815   BP:    Pulse: 89   Resp: 16   Temp:    SpO2: 98%     Post vital signs: stable  Level of consciousness: awake and responds to simple questions  Post-anesthesia pain: pain controlled  Post-anesthesia nausea and vomiting: no  Pulmonary: unassisted, return to baseline  Cardiovascular: stable and blood pressure at baseline  Hydration: adequate  Anesthetic events: no    QCDR Measures:  ASA# 11 - Vida-op Cardiac Arrest: ASA11B - Patient did NOT experience unanticipated cardiac arrest  ASA# 12 - Vida-op Mortality Rate: ASA12B - Patient did NOT die  ASA# 13 - PACU Re-Intubation Rate: ASA13G - Patient had a planned trial of extubation  ASA# 10 - Composite Anes Safety: ASA10A - No serious adverse event    Additional Notes:

## 2021-06-20 NOTE — PROGRESS NOTES
Spiritual Care Note    Spiritual Assessment: Patient seems to be doing well and is ready for surgery. Family is present for support.   No concerns noted.    Care Provided: Prayer and support provided.     Plan of Care: Spiritual Care will remain available should patient have further needs.     JOANA Grissom, [facility]

## 2021-06-20 NOTE — PROGRESS NOTES
Worked with pt to use his flutter valve and IS.  Pt needed redirection several times with both the flutter valve and the IS even though he continued to try to do it on his own.  He was able to achieve 1000 mls on his IS.  Weak, non productive cough.  Pt was trialed off of oxygen while working with PT.  His oxygen saturation was 91-92% with deep breathing but would drop with shallow breathing.  Returned to 2 lpm NC by nursing.

## 2021-06-20 NOTE — PROGRESS NOTES
RT Heart Teaching Worksheet       Date of Surgery 10/4/18         Date Taught 10/3/18  Time of Surgery 730          Surgeon Dr. Meade    IS  2500 ml Achieved        IS 2500 ml Predicted        Height 70 in.      History : Smoker ?: no   Quit ? :  Yes, 1 ppd         When?: 2000                       History: COPD ?: no                                       Asthma ? : no               IRA ? : no               Home Machine ? : no                                         What Meds if Lung History?: none    Items to discuss with Patient : (done or not done)     Heart Pillow/ Coughing: done  Flutter Valve: done  Questions Answered: done     Charting that needs to be done: (done or not done)    IS charted in Flowsheet: done  IS achieved placed in patient binder done  Education Charted in Ed Activity done  Charges/productivity done      Comments/ Note : questions answered

## 2021-06-20 NOTE — PROCEDURES
Angiogram;    Two previous vein grafts occluded.  LAD FFR positive study with gradient change occurring in mid segment.  Diabetic    Evaluate for revascularization options, discuss redo CABG option with surgery.

## 2021-06-20 NOTE — PROGRESS NOTES
Cardiology Progress Note    Assessment:  Non-ST segment elevation myocardial infarction, chest pain-free  Coronary artery disease with history of remote coronary artery bypass graft surgery  Ischemic cardiomyopathy, mild, no significant fluid overload  Chronic kidney disease  Peripheral arterial disease    Plan:  Continue metoprolol and Imdur  CABG tomorrow    Subjective:   Denies chest pain or shortness of breath today    Objective:   /71 (Patient Position: Sitting)  Pulse 76  Temp 99.2  F (37.3  C) (Oral)   Resp 18  Ht 6' (1.829 m)  Wt 194 lb 12.8 oz (88.4 kg)  SpO2 95%  BMI 26.42 kg/m2  No intake or output data in the 24 hours ending 10/03/18 1600      Physical Exam:  GENERAL: no distress  NECK: No JVD  LUNGS: Few crackles right base  CARDIAC: regular  rhythm, S1 & S2 normal.  No heaves, thrills, gallops or murmurs.  ABDOMEN: flat, negative hepatosplenomegaly, soft and non-tender.  EXTREMITIES: No evidence of cyanosis, clubbing or edema.    Current Facility-Administered Medications   Medication Dose Route Frequency Provider Last Rate Last Dose     acetaminophen tablet 500 mg (TYLENOL)  500 mg Oral Q4H PRN Miki Mac MD         acetaminophen tablet 650 mg (TYLENOL)  650 mg Oral Q4H PRN Derrick Andersen DO   650 mg at 09/29/18 2051     adenosine 90 mg/90 mL - Pressure Wire - Pt Wt < 120 kg  90 mg  Continuous PRN Miki Mac MD   Stopped at 10/01/18 1052     albuterol nebulizer solution 2.5 mg (PROVENTIL)  2.5 mg Nebulization Q6H PRN Alex Roche MD         calcium (as carbonate) chewable tablet 400 mg (TUMS)  400 mg Oral QID PRN Derrick Andersen DO         dextrose 50 % (D50W) syringe 20-50 mL  20-50 mL Intravenous PRN Alex Roche MD         glipiZIDE (GLUCOTROL) tablet 15 mg  15 mg Oral Daily before supper Alex Roche MD   15 mg at 10/02/18 1707     glipiZIDE tablet 10 mg (GLUCOTROL)  10 mg Oral QAM AC Alex Roche MD   10 mg at 10/02/18 0842     glucagon (human recombinant)  injection 1 mg  1 mg Subcutaneous PRN Alex Roche MD         heparin 25,000 units in 0.45% sodium chloride (100 units/ml) 250 mL infusion  1-40 Units/kg/hr Intravenous Continuous Derrick Andersen DO 15.4 mL/hr at 10/03/18 0758 17 Units/kg/hr at 10/03/18 0758     hydrALAZINE tablet 25 mg (APRESOLINE)  25 mg Oral Q4H PRN Derrick Andersen DO         insulin aspart U-100 injection (NovoLOG)   Subcutaneous TID with meals Derrick Andersen DO   2 Units at 10/03/18 1203     insulin aspart U-100 injection (NovoLOG)   Subcutaneous QHS Derrick Andersen DO   2 Units at 09/30/18 2131     isosorbide dinitrate tablet 20 mg (ISORDIL)  20 mg Oral TID dinitrates Scott Briggs MD   20 mg at 10/03/18 1203     loratadine tablet 10 mg (CLARITIN)  10 mg Oral DAILY Alex Roche MD   10 mg at 10/03/18 0852     melatonin tablet 3 mg  3 mg Oral Bedtime PRN Derrick Andersen DO   3 mg at 09/30/18 0002     metoprolol succinate 24 hr tablet 50 mg (TOPROL-XL)  50 mg Oral BID Alex Roche MD   50 mg at 10/03/18 0852     morphine injection 1-2 mg  1-2 mg Intravenous Q3H PRN Miki Mac MD         mupirocin 2 % ointment 1 application (BACTROBAN)  1 application Each Nare BID Emy Nath, CNP   1 application at 10/03/18 1202     naloxone injection 0.2-0.4 mg (NARCAN)  0.2-0.4 mg Intravenous PRN Scott Briggs MD        Or     naloxone injection 0.2-0.4 mg (NARCAN)  0.2-0.4 mg Intramuscular PRN Scott Briggs MD         nitroglycerin SL tablet 0.4 mg (NITROSTAT)  0.4 mg Sublingual Q5 Min PRN Alex Roche MD         omeprazole capsule 20 mg (PriLOSEC)  20 mg Oral DAILY Alex Roche MD   20 mg at 10/03/18 0852     ondansetron injection 4 mg (ZOFRAN)  4 mg Intravenous Q4H PRN Derrick Andersen DO         oxyCODONE immediate release tablet 5-10 mg (ROXICODONE)  5-10 mg Oral Q4H PRN Miki Mac MD         simvastatin tablet 40 mg (ZOCOR)  40 mg Oral QHS Alex Roche MD   40 mg at 10/02/18 2040     [START ON 10/4/2018] sodium  chloride 0.9%  25 mL/hr Intravenous Continuous mEy Nath CNP         sodium chloride bacteriostatic 0.9 % injection 0.1-0.3 mL  0.1-0.3 mL Subcutaneous PRN Emy Nath CNP         sodium chloride flush 3 mL (NS)  3 mL Intravenous Line Care Emy Nath CNP         triamcinolone 0.1 % cream (KENALOG)   Topical BID LAZARO Kaur           Cardiographics:    Telemetry: Normal sinus rhythm    Echo:    1.Left ventricle ejection fraction is mildly decreased. The estimated left ventricular ejection fraction is 45%.    2.More pronounced inferolateral hypokinesis noted.    3.TAPSE is abnormal, which is consistent with abnormal right ventricular systolic function.    4.Mild regurgitant lesions of all 4 cardiac valves seen.    5.Mild pulmonary hypertension suggested.    6.When compared to the previous study dated 1/25/2018, overall left ventricular ejection fraction is lower on current study, inferolateral hypokinesis appears to be a larger distribution.    Coronary angio:    Prox Cx to Mid Cx lesion 70% stenosed.    Dist Cx lesion 90% stenosed.    Ost 1st Mrg to 1st Mrg lesion 100% stenosed.    Dist LAD-1 lesion 50% stenosed.    Dist LAD-2 lesion 90% stenosed.    Mid LAD lesion 70% stenosed.    Pressure wire/FFR was performed on the lesion. pre diagnositic: 0.82. post diagnostic: 0.65.    Pressure wire/FFR was performed on the lesion.    Prox RCA to Dist RCA lesion 99% stenosed.    Dist Graft lesion 100% stenosed.    Origin to Dist Graft lesion 100% stenosed.  Lab Results:     Results from last 7 days  Lab Units 10/03/18  0624   LN-SODIUM mmol/L 140   LN-POTASSIUM mmol/L 4.2   LN-CHLORIDE mmol/L 108*   LN-CO2 mmol/L 23   LN-BLOOD UREA NITROGEN mg/dL 20   LN-CREATININE mg/dL 1.39*   LN-CALCIUM mg/dL 9.4       Results from last 7 days  Lab Units 10/03/18  0624 10/02/18  0307 10/01/18  0502 09/29/18  0906   LN-WHITE BLOOD CELL COUNT thou/uL 6.3 6.8  --  7.6   LN-HEMOGLOBIN g/dL 10.9* 11.7* 12.4* 13.4*    LN-HEMATOCRIT % 34.6* 36.0*  --  41.1   LN-PLATELET COUNT thou/uL 118* 127* 123* 144     BNP   Date Value Ref Range Status   09/29/2018 40 0 - 72 pg/mL Final     Lab Results   Component Value Date    INR 1.37 (H) 10/01/2018    INR 1.73 (H) 09/29/2018    INR 1.62 (H) 09/29/2018     Lab Results   Component Value Date    CKMB 1 10/25/2013    TROPONINI 2.45 () 09/30/2018    TROPONINI 1.40 () 09/29/2018    TROPONINI 0.47 () 09/29/2018       Bobby ([facility])  MD Allan

## 2021-06-20 NOTE — PROGRESS NOTES
Speech Language/Pathology  Videofluoroscopic Swallow Study       Problem:  Patient Active Problem List   Diagnosis     Dyslipidemia, goal LDL below 70     Persistent atrial fibrillation (H)     Typical atrial flutter (H)     Venous hypertension, chronic, bilateral     Polyneuropathy associated with underlying disease (H)     Non-insulin dependent type 2 diabetes mellitus (H)     Acquired lymphedema of lower extremity     Coronary artery disease due to lipid rich plaque     PAD (peripheral artery disease) (H)     Heart failure with preserved ejection fraction, NYHA class II (H)     MESHA (acute kidney injury) (H)     Benign essential hypertension     Wound, open, foot, left, sequela     Medically noncompliant     CAP (community acquired pneumonia)     Cellulitis     Acute conjunctivitis of left eye, unspecified acute conjunctivitis type     Allergic conjunctivitis, left     C. difficile colitis     Bilateral lower leg cellulitis     Non-STEMI (non-ST elevated myocardial infarction) (H)     Abnormal CT scan, chest     Carotid stenosis, asymptomatic, right     S/P CABG x 3       Onset date: 9/29/2018  Reason for evaluation: further assessment of pharyngeal swallow given continued inconsistent s/s pharyngeal dysphagia with ice chips, thin liquids and puree textures. As well as consistent mildly delayed coughing with honey thickened liquids.   Pertinent History: listed above.   Current Diet: NPO   Baseline Diet: Regular/thin liquids     Patient presents as alert, lethargic and cooperative during this evaluation.  Patient sleeping just prior to initiation of video, but easily awakened with verbal cues from therapist. RN reports oxycodone given prior to video swallow study. Patient remained alert for duration of swallow evaluation and was generally cooperative. Although, increased time required to follow instructions and increasing confusion noted as evaluation progressed.   An  was not applicable    Patient was  given puree, honey, nectar and thin.       Oral Phase:    Dentition/Oral hygiene: grossly intact    Bolus prep and oral control was mildly impaired. Mastication was decreased.     Anterior-Posterior transit was not impaired.    Premature spillage occurred with all textures trialed.    Tongue base retraction was moderately impaired.     Mild lingual stasis occurred inconsistently with thicker consistencies of puree and honey thick. Patient demonstrated adequate clearance of material with use of dry swallows. Pt needed verbal cues to second swallow ~ 50% of the time.     Pharyngeal Phase:    Aspiration occurred with thin. Patient had no immediate response to aspiration, but eventually demonstrated significantly delayed weak throat clear, cough. Volitional throat clear, coughing on command was ineffective in clearing material from airway.      Deep laryngeal penetration occurred with thin. No penetration with nectar thick, honey thick or puree textures.     The strategies of reduced bolus size, various bolus presentation methods and chin tuck were trialed with thin resulting in no improvement in swallow safety or function. Pt demonstrates decreased awareness and confusion, along with disorientation, but was generally receptive and able to follow commands during video swallow study.    Swallow response was delayed with honey thick, nectar thick and thin. Pourover past the epiglottis occurred consistently with nectar thick and thin. Consistent pourover to the level of the pyriforms with nectar via spoon and thin liquids. Pourover past the epiglottis occurred inconsistently with honey thickened liquids. Pt demonstrated no pourover past the epiglottis with small bites/drinks of honey thick via spoon.     Epiglottic movement was in a posterior-inferior pattern  and to horizontal position inconsistently across texture trials.    Mild - moderate pharyngeal stasis occurred with puree and honey thick at the base of the tongue  and the level of the valleculae. Patient is able to clear majority of stasis with second swallow on command, but does not do this independently. Noted build-up of stasis with successive bites of puree and x1 honey thickened liquids without use of second swallow.     Pharyngeal constriction was not impaired.    Hyolaryngeal elevation was reduced. Hyolaryngeal excursion was reduced, nearly absent.    Cricopharyngeal function was not impaired. Cervical esophageal function was not observed .    Assessment:    Patient demonstrated mild - moderate oral and moderate pharyngeal dysphagia.    Patient is at high aspiration risk with thin liquids and mild risk of aspiration with nectar thick liquids given swallow delay and incomplete epiglottic inversion resulting in poor airway protection. Although, patient demonstrated ability to consistently protect airway with nectar thick liquids via spoon despite previously listed deficits. Increased risk aspiration noted with larger drinks via cup. Patient is at mild risk of aspiration with puree and honey given vallecular stasis and need for consistent use of multiple swallows to adequately clear stasis in order to prevent build-up with subsequent bites.     Rehab potential is good based on evaluation results and recent progress.    Patient is not a good candidate to complete compensatory strategies independently to order to improve pharyngeal phase of swallow d/t decreased awareness and variable confusion. Sister reports undiagnosed cognitive impairment at baseline. Therefore, due to current cognitive status diet modification is the best way to compensate for current pharyngeal dysphagia/risk of aspiration.    Recommendations:    Plan: recommend diet of NDD1 and nectar thick liquids. Continue to provide medication crushed in puree textures. Initiation oral-pharyngeal strengthening program and sour-thermal stimulation.      Nursing please monitor closely for coughing during oral  intake. If s/s aspiration observed please change to NPO and notify speech therapy immediately.     Strategies: small bites/drinks; drinks via SPOON ONLY; upright-sitting position and alert for all oral intake. Anticipate patient will need assistance with meals given upper extremity weakness.     Speech therapy 4-6 times per week    Referrals: repeat video swallow study prior to advancing liquids    20 dysphagia minutes

## 2021-06-20 NOTE — PROGRESS NOTES
Progress Note    Assessment/Plan  S/P Redo CABG X 3/PVI/LAAL POD # 2  Awake and alert, out of bed to chair at time of evaluation, with no apparent distress  Complaint of incisional chest pain, improved from yesterday  AVSS, sinus per monitor  Postop pulmonary insufficiency requiring supplemental oxygen 6 L nasal cannula, saturation 94%  Pool inspiratory effort, I S ~ 750  Acute blood loss anemia, Hgb 8.8 unchanged from yesterday  Thrombocytopenia, Plt 103 up from 98  Diabetes mellitus, on insulin drip 2.14 units/h  Lantus 25 units subcu x1 dose then Q 4 hrs SSI  MESHA resolving, Cr 1.38, down from 1.55 yesterday  Continue Flexeril 10 mg every 8 hours for muscle spasm.  There is contraindication for the use of this medication because of his recent MRI and heart failure however since patient is on a telemetry monitor, we used this medication on this short-term to control pain secondary to muscle spasm  No evidence of left-sided deficit today  Start CT scan of the head was obtained yesterday, showed no evidence of acute intracranial hemorrhage or mass-effect  Postop cardiovascular insufficiency resolved requiring epinephrine and levophed for pressure support, now off pressors  Recent NSTEMI, dyslipidemia, persistent A. fib, peripheral arterial disease benign hypertension, asymptomatic right carotid stenosis  Incisions are clean dry intact, lung sounds diminished bilaterally  Chest tube drainage 220/780, serosanguineous  Leave chest tube in today due to excessive drainage  Abdomen is obese and mildly distended, with sluggish/hypoactive bowel sounds in all 4 quadrants  Urine output adequate with diuresis, no BM yet  Weight 91.6 kg down from 94 kg yesterday, preop weight 89.4 kg  Continue Lasix 20 mg IV every 8 hours x 24 hours  Start metoprolol 25 mg twice a day  Continue pulmonary toilet, pillars and ambulate with cardiac rehab  Transfer to telemetry floor today         Subjective  Complaint of incisional chest pain mild  shortness of breath secondary to chest pain  Objective  Awake and alert, out of bed to chair with no apparent distress  Vital signs in last 24 hours  Temp:  [99.6  F (37.6  C)-100.8  F (38.2  C)] 100.8  F (38.2  C)  Heart Rate:  [83-96] 90  Resp:  [16-40] 25  BP: (106-113)/(56-68) 112/68  Arterial Line BP: ()/() 133/57  Weight:   202 lb (91.6 kg)  Preop weight 89.8 kg  Intake/Output last 3 shifts  I/O last 3 completed shifts:  In: 745 [I.V.:745]  Out: 2608 [Urine:1828; Chest Tube:780]  Intake/Output this shift:       Review of Systems   A 12 point comprehensive review of systems was negative except as noted.    Physical Exam  /68  Pulse 70  Temp 98.7  F (37.1  C) (Oral)   Resp 28  Ht 6' (1.829 m)  Wt 202 lb (91.6 kg)  SpO2 97%  BMI 27.4 kg/m2  HRR, incisions are CDI, lung sounds diminished bilaterally  Abdomen soft and nontender  Mild bilateral lower extremity edema    Pertinent Labs   Lab Results: personally reviewed.   Lab Results   Component Value Date     10/06/2018    K 4.1 10/06/2018     (H) 10/06/2018    CO2 21 (L) 10/06/2018    BUN 23 10/06/2018    CREATININE 1.38 (H) 10/06/2018    CALCIUM 9.3 10/06/2018     Lab Results   Component Value Date    WBC 9.4 10/06/2018    HGB 8.8 (L) 10/06/2018    HCT 27.9 (L) 10/06/2018    MCV 89 10/06/2018     (L) 10/06/2018       Pertinent Radiology   Radiology Results: Not yet available for review and Awaiting two-view chest x-ray in the morning  EKG Results: personally reviewed.  and Sinus per monitor rate 65    Domingo Toledo

## 2021-06-20 NOTE — PROGRESS NOTES
Progress Note    Assessment/Plan  S/P Redo CABG X 3/PVI/LAAL POD # 3  Awake and alert, out of bed to chair at time of evaluation, with no apparent distress  AVSS, sinus per monitor  Postop pulmonary insufficiency requiring supplemental oxygen 2.5 L nasal cannula, saturation 94%  Pool inspiratory effort, I S ~ 900  Acute blood loss anemia, last Hgb 8.8 will continue to monitor  Thrombocytopenia, last Plt 103 up from 98  Diabetes mellitus, on insulin drip 2.14 units/h  Lantus 25 units subcu x1 dose then Q 4 hrs SSI  MESHA resolving, Cr 1.38, down from 1.55 yesterday  Recent NSTEMI, dyslipidemia, persistent A. fib, peripheral arterial disease benign hypertension, asymptomatic right carotid stenosis  Incisions are clean dry intact, lung sounds diminished bilaterally  Chest tube drainage 240/410, serosanguineous and also air leak noted on chest with coughing and deep breathing  Leave chest tube in today due to excessive drainage  Abdomen is obese and mildly distended, with sluggish/hypoactive bowel sounds in all 4 quadrants  Urine output adequate with diuresis, no BM yet  Weight 91.9 kg up from 91.6 kg yesterday, preop weight 89.4 kg  Continue Lasix 20 mg IV every 8 hours x 24 hours  Start metoprolol 25 mg twice a day  Awaiting swallow evaluation done yesterday and patient is on nectar thick liquids  Continue pulmonary toilet, pillars and ambulate with cardiac rehab  Discharge disposition is on 24-48 hours        Subjective  Denies chest pain and or shortness of breath  Objective  Awake and alert, out of bed to chair with no apparent distress  Vital signs in last 24 hours  Temp:  [97.9  F (36.6  C)-99.4  F (37.4  C)] 97.9  F (36.6  C)  Heart Rate:  [62-73] 70  Resp:  [18-33] 20  BP: ()/(56-73) 128/72  Weight:   202 lb 9.6 oz (91.9 kg)  Previa with 89.8 kg  Intake/Output last 3 shifts  I/O last 3 completed shifts:  In: 1210 [P.O.:1200; I.V.:10]  Out: 2559 [Urine:2149; Chest Tube:410]  Intake/Output this shift:        Review of Systems   A 12 point comprehensive review of systems was negative except as noted.    Physical Exam  /72 (Patient Position: Lying)  Pulse 70  Temp 97.9  F (36.6  C) (Oral)   Resp 20  Ht 6' (1.829 m)  Wt 202 lb 9.6 oz (91.9 kg)  SpO2 95%  BMI 27.48 kg/m2  HRR, incisions are CDI, sounds diminished at bases  Abdomen soft and nontender  Mild lower extremity edema    Pertinent Labs   Lab Results: personally reviewed.   Lab Results   Component Value Date     10/07/2018    K 4.3 10/07/2018    K 4.3 10/07/2018     (H) 10/07/2018    CO2 21 (L) 10/07/2018    BUN 28 10/07/2018    CREATININE 1.37 (H) 10/07/2018    CALCIUM 9.6 10/07/2018     Lab Results   Component Value Date    WBC 8.6 10/07/2018    HGB 8.9 (L) 10/07/2018    HCT 29.4 (L) 10/07/2018    MCV 93 10/07/2018     (L) 10/07/2018       Pertinent Radiology   Radiology Results: Personally reviewed image/s, Personally reviewed impression/s and Bilateral pulmonary congestion and basilar atelectasis  EKG Results: personally reviewed.  and Sinus per monitor    Domingo Toledo

## 2021-06-21 NOTE — PROGRESS NOTES
Code Status:  FULL CODE  Visit Type: Problem Visit     Facility:  [facility] [[id number]]         Facility Type: SNF (Skilled Nursing Facility, TCU)    History of Present Illness: Ever Crane is a 73 y.o. male who I am seeing today for follow-up on the TCU. Pt admitted to [facility] on 9/29/2018 following ED presentation for chest pain and subsequent diagnosis of NSTEMI. He has history of previous CABG, atrial fibrillation, CKD, HLD, HTN, peripheral arterial disease, and DMII. Coronary angiogram demonstrated non-patent grafts and severe coronary artery disease. He was referred to CV surgery for evaluation for possible coronary artery revascularization. Pt underwent 3 vessel CABG on 10/04/2018  and endoscopic vein harvest from the lft leg.  Preoperative patient had atrial field he continues on Coumadin and beta-blocker.  No further episodes.  Dysphagia.  Patient seen by speech therapy and requires nectar thick liquids.  Patient also to found high-grade stenosis of the right internal carotid artery.  He should have a follow-up with vascular in 3 months.    Today patient lying in bed. Continues with occasional cough. He tells me the breathing treatments are helping. He reports shortness of breath with exertion. He continues on Doxycycline as well as lasix. His weight is going down. He does not like the thickened liquids. He is having regular bowel movements.       Active Ambulatory Problems     Diagnosis Date Noted     Dyslipidemia, goal LDL below 70      Persistent atrial fibrillation (H)      Typical atrial flutter (H)      Venous hypertension, chronic, bilateral 08/10/2017     Polyneuropathy associated with underlying disease (H) 08/10/2017     Non-insulin dependent type 2 diabetes mellitus (H)      Acquired lymphedema of lower extremity 10/16/2017     Coronary artery disease due to lipid rich plaque      PAD (peripheral artery disease) (H) 11/14/2017     Heart failure with preserved  ejection fraction, NYHA class II (H) 01/25/2018     MESHA (acute kidney injury) (H) 01/25/2018     Benign essential hypertension      Wound, open, foot, left, sequela 06/20/2018     Medically noncompliant 06/20/2018     CAP (community acquired pneumonia) 07/02/2018     Cellulitis 08/11/2018     Acute conjunctivitis of left eye, unspecified acute conjunctivitis type      Allergic conjunctivitis, left      C. difficile colitis      Bilateral lower leg cellulitis 08/31/2018     Non-STEMI (non-ST elevated myocardial infarction) (H)      Abnormal CT scan, chest      Carotid stenosis, asymptomatic, right      S/P CABG x 3      Resolved Ambulatory Problems     Diagnosis Date Noted     Essential hypertension      Leg ulcer, left (H) 12/30/2014     Leg ulcer, left, limited to breakdown of skin (H) 08/10/2017     Diabetic ulcer of both feet (H) 10/31/2017     Ischemic cardiomyopathy      Hyperkalemia 11/14/2017     Gangrene of left foot (H)      S/P transmetatarsal amputation of foot, left (H) 11/27/2017     Ulcer of foot, limited to breakdown of skin, unspecified laterality (H) 01/08/2018     Chest pain 09/29/2018     Past Medical History:   Diagnosis Date     Atrial flutter (H)      Candidiasis of perineum 1/3/2018     Coronary artery disease due to lipid rich plaque 2000     Diabetic ulcer of both feet (H) 10/31/2017     Dyslexia      Dyslipidemia, goal LDL below 70 2000     Essential hypertension      Gangrene of left foot (H)      Neuropathy (H)      Paroxysmal Atrial Fibrillation      Type 2 diabetes mellitus, without long-term current use of insulin (H)      Unable to function independently 11/13/2017       Current Outpatient Prescriptions   Medication Sig     acetaminophen (TYLENOL) 325 MG tablet Take 2 tablets (650 mg total) by mouth every 6 (six) hours as needed for pain.     albuterol (PROVENTIL) 2.5 mg /3 mL (0.083 %) nebulizer solution Take 3 mL (2.5 mg total) by nebulization every 6 (six) hours as needed for  wheezing or shortness of breath.     aluminum-magnesium hydroxide-simethicone (MAALOX ADVANCED) 200-200-20 mg/5 mL Susp Take 30 mL by mouth every 4 (four) hours as needed (gastrointestinal upset).     aspirin 81 mg chewable tablet Chew 1 tablet (81 mg total) daily.     bisacodyl (DULCOLAX) 5 mg EC tablet Take 2 tablets (10 mg total) by mouth daily as needed for constipation.     cetirizine (ZYRTEC) 10 MG tablet Take 10 mg by mouth daily.     furosemide (LASIX) 20 MG tablet Take 20 mg by mouth daily as needed. If weight increases 2 lbs in 24 hours.     furosemide (LASIX) 40 MG tablet Take 40 mg by mouth daily.     gentamicin (GARAMYCIN) 0.1 % ointment Apply 1 application topically daily as needed.     glipiZIDE (GLUCOTROL) 10 MG tablet Take 10 mg by mouth daily before breakfast.     glipiZIDE (GLUCOTROL) 10 MG tablet Take 15 mg by mouth every evening.     hydrocortisone 1 % cream Apply 1 application topically 2 (two) times a day as needed.     Lactobacillus acidophilus 100 mg (1 billion cell) cap Take 1 capsule by mouth 2 (two) times a day. (Patient taking differently: Take 2 capsules by mouth 2 (two) times a day. )     loratadine (CLARITIN) 10 mg tablet Take 10 mg by mouth daily.     metoprolol tartrate (LOPRESSOR) 25 MG tablet Take 1 tablet (25 mg total) by mouth 2 (two) times a day.     miconazole (MICOTIN) 2 % powder Apply 1 application topically daily as needed for itching.     miconazole (SECURA EXTRA THICK) 2 % cream Apply 1 application topically 2 (two) times a day as needed.     miconazole nitrate-zinc ox-pet (VUSION) 0.25-15-81.35 % Oint Apply 1 application topically 2 (two) times a day as needed.     min oil-petrolat (AQUAPHOR) ointment Apply 1 application topically 3 (three) times a day as needed.     nitroglycerin (NITROSTAT) 0.4 MG SL tablet Place 0.4 mg under the tongue every 5 (five) minutes as needed for chest pain.      NOVOLOG U-100 INSULIN ASPART 100 unit/mL injection Check blood sugar four (4)  times daily.  11.9 Type 2 without complications     omeprazole (PRILOSEC) 20 MG capsule Take 20 mg by mouth daily.      simvastatin (ZOCOR) 40 MG tablet Take 40 mg by mouth at bedtime.      spironolactone (ALDACTONE) 25 MG tablet Take 25 mg by mouth daily.     triamcinolone (KENALOG) 0.1 % cream Apply to lower legs and hands two times a day for 10 days only - not to face     urea 10 % lotion Apply 1 application topically daily as needed.     warfarin (COUMADIN/JANTOVEN) 5 MG tablet Take one 5mg tablet tonight and have labs rechecked and coumadin dosed based on INR       Allergies   Allergen Reactions     Latex      Penicillins Rash     Childhood rxn  -- tolerated cefazolin 10/4/18         Review of Systems   No fevers or chills. No headache, lightheadedness or dizziness. No SOB, chest pains or palpitations. Appetite is good. No nausea, vomiting, constipation or diarrhea. No dysuria, frequency, burning or pain with urination. Otherwise review of systems are negative.         Physical Exam   PHYSICAL EXAMINATION:  Vital signs: /67, heart rate 55, temperature 97.8, respirations 16 O2 sat 98% on room air.  Current weight 184.4 pounds  General: Awake, Alert, oriented x3, appropriately, follows simple commands, conversant  HEENT:PERRLA, Pink conjunctiva, anicteric sclerae, moist oral mucosa  NECK: Supple, without any lymphadenopathy, or masses  CVS:  S1  S2, without murmur or gallop.  Incision to chest intact with glue.  3 Band-Aids over the drain sites.  No signs or symptoms of infection.  LUNG: Right lower lobe crackles. Occasional cough improving from last visit. No shortness of breath at rest.  BACK: No kyphosis of the thoracic spine  ABDOMEN: Soft, nontender to palpation, with positive bowel sounds  EXTREMITIES: Good range of motion on both upper and lower extremities,1-2+ pedal edema, no calf tenderness  SKIN: Warm and dry, no rashes or erythema noted  NEUROLOGIC: Intact, pulses palpable  PSYCHIATRIC:  Cognition intact            Labs:   Recent Results (from the past 240 hour(s))   POCT Glucose   Result Value Ref Range    Glucose,  mg/dL   POCT Glucose   Result Value Ref Range    Glucose,  mg/dL   Basic Metabolic Panel   Result Value Ref Range    Sodium 142 136 - 145 mmol/L    Potassium 4.3 3.5 - 5.0 mmol/L    Chloride 104 98 - 107 mmol/L    CO2 29 22 - 31 mmol/L    Anion Gap, Calculation 9 5 - 18 mmol/L    Glucose 107 70 - 125 mg/dL    Calcium 9.9 8.5 - 10.5 mg/dL    BUN 30 (H) 8 - 28 mg/dL    Creatinine 1.25 0.70 - 1.30 mg/dL    GFR MDRD Af Amer >60 >60 mL/min/1.73m2    GFR MDRD Non Af Amer 57 (L) >60 mL/min/1.73m2   Potassium - Next AM   Result Value Ref Range    Potassium 4.3 3.5 - 5.0 mmol/L   INR   Result Value Ref Range    INR 1.15 (H) 0.90 - 1.10   HM1 (CBC with Diff)   Result Value Ref Range    WBC 5.9 4.0 - 11.0 thou/uL    RBC 3.14 (L) 4.40 - 6.20 mill/uL    Hemoglobin 8.7 (L) 14.0 - 18.0 g/dL    Hematocrit 27.8 (L) 40.0 - 54.0 %    MCV 89 80 - 100 fL    MCH 27.7 27.0 - 34.0 pg    MCHC 31.3 (L) 32.0 - 36.0 g/dL    RDW 14.2 11.0 - 14.5 %    Platelets 165 140 - 440 thou/uL    MPV 10.9 8.5 - 12.5 fL    Neutrophils % 73 (H) 50 - 70 %    Lymphocytes % 14 (L) 20 - 40 %    Monocytes % 7 2 - 10 %    Eosinophils % 5 0 - 6 %    Basophils % 1 0 - 2 %    Neutrophils Absolute 4.3 2.0 - 7.7 thou/uL    Lymphocytes Absolute 0.9 0.8 - 4.4 thou/uL    Monocytes Absolute 0.4 0.0 - 0.9 thou/uL    Eosinophils Absolute 0.3 0.0 - 0.4 thou/uL    Basophils Absolute 0.0 0.0 - 0.2 thou/uL   Magnesium   Result Value Ref Range    Magnesium 2.2 1.8 - 2.6 mg/dL   POCT Glucose   Result Value Ref Range    Glucose,  mg/dL   POCT Glucose   Result Value Ref Range    Glucose,  mg/dL   POCT Glucose   Result Value Ref Range    Glucose,  mg/dL   POCT Glucose   Result Value Ref Range    Glucose,  mg/dL   Basic Metabolic Panel   Result Value Ref Range    Sodium 139 136 - 145 mmol/L    Potassium 4.1 3.5 -  5.0 mmol/L    Chloride 103 98 - 107 mmol/L    CO2 25 22 - 31 mmol/L    Anion Gap, Calculation 11 5 - 18 mmol/L    Glucose 149 (H) 70 - 125 mg/dL    Calcium 9.6 8.5 - 10.5 mg/dL    BUN 26 8 - 28 mg/dL    Creatinine 1.12 0.70 - 1.30 mg/dL    GFR MDRD Af Amer >60 >60 mL/min/1.73m2    GFR MDRD Non Af Amer >60 >60 mL/min/1.73m2   INR   Result Value Ref Range    INR 1.24 (H) 0.90 - 1.10   Potassium - Next AM   Result Value Ref Range    Potassium 4.1 3.5 - 5.0 mmol/L   Magnesium   Result Value Ref Range    Magnesium 2.1 1.8 - 2.6 mg/dL   HM1 (CBC with Diff)   Result Value Ref Range    WBC 5.1 4.0 - 11.0 thou/uL    RBC 3.21 (L) 4.40 - 6.20 mill/uL    Hemoglobin 8.7 (L) 14.0 - 18.0 g/dL    Hematocrit 28.5 (L) 40.0 - 54.0 %    MCV 89 80 - 100 fL    MCH 27.1 27.0 - 34.0 pg    MCHC 30.5 (L) 32.0 - 36.0 g/dL    RDW 14.2 11.0 - 14.5 %    Platelets 164 140 - 440 thou/uL    MPV 10.8 8.5 - 12.5 fL    Neutrophils % 70 50 - 70 %    Lymphocytes % 15 (L) 20 - 40 %    Monocytes % 9 2 - 10 %    Eosinophils % 5 0 - 6 %    Basophils % 1 0 - 2 %    Neutrophils Absolute 3.5 2.0 - 7.7 thou/uL    Lymphocytes Absolute 0.8 0.8 - 4.4 thou/uL    Monocytes Absolute 0.5 0.0 - 0.9 thou/uL    Eosinophils Absolute 0.3 0.0 - 0.4 thou/uL    Basophils Absolute 0.0 0.0 - 0.2 thou/uL   POCT Glucose   Result Value Ref Range    Glucose,  mg/dL   POCT Glucose   Result Value Ref Range    Glucose,  mg/dL   INR   Result Value Ref Range    INR 1.60 (!) 0.9 - 1.1   Basic Metabolic Panel   Result Value Ref Range    Sodium 141 136 - 145 mmol/L    Potassium 3.9 3.5 - 5.0 mmol/L    Chloride 105 98 - 107 mmol/L    CO2 27 22 - 31 mmol/L    Anion Gap, Calculation 9 5 - 18 mmol/L    Glucose 108 70 - 125 mg/dL    Calcium 9.6 8.5 - 10.5 mg/dL    BUN 24 8 - 28 mg/dL    Creatinine 1.20 0.70 - 1.30 mg/dL    GFR MDRD Af Amer >60 >60 mL/min/1.73m2    GFR MDRD Non Af Amer 59 (L) >60 mL/min/1.73m2   HM2(CBC w/o Differential)   Result Value Ref Range    WBC 6.6 4.0 -  11.0 thou/uL    RBC 3.52 (L) 4.40 - 6.20 mill/uL    Hemoglobin 9.6 (L) 14.0 - 18.0 g/dL    Hematocrit 31.1 (L) 40.0 - 54.0 %    MCV 88 80 - 100 fL    MCH 27.3 27.0 - 34.0 pg    MCHC 30.9 (L) 32.0 - 36.0 g/dL    RDW 14.4 11.0 - 14.5 %    Platelets 234 140 - 440 thou/uL    MPV 11.0 8.5 - 12.5 fL   INR   Result Value Ref Range    INR 1.86 (H) 0.90 - 1.10   INR   Result Value Ref Range    INR 2.90 (!) 0.9 - 1.1   INR   Result Value Ref Range    INR 3.90 (!) 0.9 - 1.1   Basic Metabolic Panel   Result Value Ref Range    Sodium 139 136 - 145 mmol/L    Potassium 4.8 3.5 - 5.0 mmol/L    Chloride 105 98 - 107 mmol/L    CO2 23 22 - 31 mmol/L    Anion Gap, Calculation 11 5 - 18 mmol/L    Glucose 105 70 - 125 mg/dL    Calcium 10.0 8.5 - 10.5 mg/dL    BUN 33 (H) 8 - 28 mg/dL    Creatinine 1.38 (H) 0.70 - 1.30 mg/dL    GFR MDRD Af Amer >60 >60 mL/min/1.73m2    GFR MDRD Non Af Amer 51 (L) >60 mL/min/1.73m2   BNP(B-type Natriuretic Peptide)   Result Value Ref Range     (H) 0 - 72 pg/mL   HM1 (CBC with Diff)   Result Value Ref Range    WBC 8.9 4.0 - 11.0 thou/uL    RBC 3.86 (L) 4.40 - 6.20 mill/uL    Hemoglobin 10.4 (L) 14.0 - 18.0 g/dL    Hematocrit 33.5 (L) 40.0 - 54.0 %    MCV 87 80 - 100 fL    MCH 26.9 (L) 27.0 - 34.0 pg    MCHC 31.0 (L) 32.0 - 36.0 g/dL    RDW 14.5 11.0 - 14.5 %    Platelets 329 140 - 440 thou/uL    MPV 11.2 8.5 - 12.5 fL    Neutrophils % 70 50 - 70 %    Lymphocytes % 14 (L) 20 - 40 %    Monocytes % 10 2 - 10 %    Eosinophils % 6 0 - 6 %    Basophils % 1 0 - 2 %    Neutrophils Absolute 6.1 2.0 - 7.7 thou/uL    Lymphocytes Absolute 1.2 0.8 - 4.4 thou/uL    Monocytes Absolute 0.9 0.0 - 0.9 thou/uL    Eosinophils Absolute 0.5 (H) 0.0 - 0.4 thou/uL    Basophils Absolute 0.1 0.0 - 0.2 thou/uL         Assessment/Plan:  1. MI, acute, non ST segment elevation (H)     2. S/P CABG x 1     3. Dysphasia     4. Atrial fibrillation (H)     5. Heart failure with preserved ejection fraction (H)     6. Community acquired  pneumonia, unspecified laterality     7. Type 2 diabetes mellitus with diabetic peripheral angiopathy and gangrene, without long-term current use of insulin (H)       Patient recently hospitalized with non-ST MI who underwent CABG x3 on 10/4/18.  He did have some dysphagia post surgery.  Continues with speech therapy.  Currently on thickened liquids. Recent pneumonia. He continues on doxycycline.  X-rays did show a infiltrate. I did give him and additional 20 mg of Lasix. I will increase his lasix to 60mg daily X 3 days then resume 40mg. daily weights. Continue with nebs. DM. Occasional 200s. Will continues to monitor. Acute blood loss anemia. Hemoglobin 9.6.  Will repeat laboratory late next week.      Electronically signed by: Ashley Argueta, DANIELA

## 2021-06-21 NOTE — PROGRESS NOTES
Cardiac Rehab  Phase II Assessment    Assessment Date: 11/15/18    Diagnosis: NSTEMI/CABG x3  Date of Onset: 10/4/18  ICD/Pacemaker: No   Post-op Complications: None  ECG History: NSR, Afib, AFlutter EF%:45%  Past Medical History:   Past Medical History:   Diagnosis Date     Atrial flutter (H)      Candidiasis of perineum 1/3/2018     Coronary artery disease due to lipid rich plaque 2000    CABG x2     Diabetic ulcer of both feet (H) 10/31/2017     Dyslexia      Dyslipidemia, goal LDL below 70 2000     Essential hypertension      Gangrene of left foot (H)      Neuropathy (H)      Paroxysmal Atrial Fibrillation     Moe Brian: 8/2011 Cardioversion; CHADS2 VASC = 5; he is on warfarin and sotalol      Type 2 diabetes mellitus, without long-term current use of insulin (H)      Unable to function independently 11/13/2017     Patient Active Problem List   Diagnosis     Dyslipidemia, goal LDL below 70     Persistent atrial fibrillation (H)     Typical atrial flutter (H)     Venous hypertension, chronic, bilateral     Polyneuropathy associated with underlying disease (H)     Non-insulin dependent type 2 diabetes mellitus (H)     Acquired lymphedema of lower extremity     Coronary artery disease due to lipid rich plaque     PAD (peripheral artery disease) (H)     Heart failure with preserved ejection fraction, NYHA class II (H)     Benign essential hypertension     Wound, open, foot, left, sequela     Medically noncompliant     Cellulitis     Acute conjunctivitis of left eye, unspecified acute conjunctivitis type     Allergic conjunctivitis, left     Bilateral lower leg cellulitis     Non-STEMI (non-ST elevated myocardial infarction) (H)     Abnormal CT scan, chest     Carotid stenosis, asymptomatic, right     S/P CABG x 3       Physical Assessment  Precautions/ Physical Limitations: Left Foot Toes Amputation  Oxygen: No  O2 Sats: 98% Lung Sounds: Crackles/Diminished Right Base Edema: None  Incisions: Healed  Sleeping  Pattern: good   Appetite: good   Nutrition Risk Screen: Wound Healing    Pain  Location: None    Psychosocial/ Emotional Health  1. In the past 12 months, have you been in a relationship where you have been abused physically, emotionally, sexually or financially? No  notified: NA  2. Who do you turn to for emotional support?: Family  3. Do you have cultural or spiritual needs? No  4. Have there been any major life changes in the past 12 months? No    Referral Information  Primary Physician: Noé Newton MD (Pt reports [facility])  Cardiologist: Abhi  Surgeon: Adina    [place] exercise/Equipment: Recumbent Bike    Patient's long-term goal(s): Continue to tolerate PT Work    1. Living Accommodations: Other: Assisted Living Steps: No      Support people at home: Family Close by   2. Marital Status: single  3. Family is able to assist with cares  4. Recreation/Hobbies: PT Work, Cars

## 2021-06-21 NOTE — PROGRESS NOTES
Code Status:  FULL CODE  Visit Type: Problem Visit     Facility:  [facility] [[id number]]         Facility Type: SNF (Skilled Nursing Facility, TCU)    History of Present Illness: Ever Crane is a 73 y.o. male who I am seeing today for follow-up on the TCU. Pt admitted to [facility] on 9/29/2018 following ED presentation for chest pain and subsequent diagnosis of NSTEMI. He has history of previous CABG, atrial fibrillation, CKD, HLD, HTN, peripheral arterial disease, and DMII. Coronary angiogram demonstrated non-patent grafts and severe coronary artery disease. He was referred to CV surgery for evaluation for possible coronary artery revascularization. Pt underwent 3 vessel CABG on 10/04/2018  and endoscopic vein harvest from the lft leg.  Preoperative patient had atrial field he continues on Coumadin and beta-blocker.  No further episodes.  Dysphagia.  Patient seen by speech therapy and requires nectar thick liquids.  Patient also to found high-grade stenosis of the right internal carotid artery.  He should have a follow-up with vascular in 3 months.    Today patient sitting up on bed.  Denies any cough.  He is noncompliant with his swallowing restrictions.  He had a cardiology appointment today and ate Valenzuela's prior to returning to the facility.  Continues to report fullness in his ears and decreased hearing.  He does have moderate wax buildup.  No lower extremity edema.  Lung sounds are clear.  Shortness of breath improved.  He denies any chest pain.    Active Ambulatory Problems     Diagnosis Date Noted     Dyslipidemia, goal LDL below 70      Persistent atrial fibrillation (H)      Typical atrial flutter (H)      Venous hypertension, chronic, bilateral 08/10/2017     Polyneuropathy associated with underlying disease (H) 08/10/2017     Non-insulin dependent type 2 diabetes mellitus (H)      Acquired lymphedema of lower extremity 10/16/2017     Coronary artery disease due to lipid  rich plaque      PAD (peripheral artery disease) (H) 11/14/2017     Heart failure with preserved ejection fraction, NYHA class II (H) 01/25/2018     MESHA (acute kidney injury) (H) 01/25/2018     Benign essential hypertension      Wound, open, foot, left, sequela 06/20/2018     Medically noncompliant 06/20/2018     CAP (community acquired pneumonia) 07/02/2018     Cellulitis 08/11/2018     Acute conjunctivitis of left eye, unspecified acute conjunctivitis type      Allergic conjunctivitis, left      C. difficile colitis      Bilateral lower leg cellulitis 08/31/2018     Non-STEMI (non-ST elevated myocardial infarction) (H)      Abnormal CT scan, chest      Carotid stenosis, asymptomatic, right      S/P CABG x 3      Resolved Ambulatory Problems     Diagnosis Date Noted     Essential hypertension      Leg ulcer, left (H) 12/30/2014     Leg ulcer, left, limited to breakdown of skin (H) 08/10/2017     Diabetic ulcer of both feet (H) 10/31/2017     Ischemic cardiomyopathy      Hyperkalemia 11/14/2017     Gangrene of left foot (H)      S/P transmetatarsal amputation of foot, left (H) 11/27/2017     Ulcer of foot, limited to breakdown of skin, unspecified laterality (H) 01/08/2018     Chest pain 09/29/2018     Past Medical History:   Diagnosis Date     Atrial flutter (H)      Candidiasis of perineum 1/3/2018     Coronary artery disease due to lipid rich plaque 2000     Diabetic ulcer of both feet (H) 10/31/2017     Dyslexia      Dyslipidemia, goal LDL below 70 2000     Essential hypertension      Gangrene of left foot (H)      Neuropathy (H)      Paroxysmal Atrial Fibrillation      Type 2 diabetes mellitus, without long-term current use of insulin (H)      Unable to function independently 11/13/2017       Current Outpatient Prescriptions   Medication Sig     acetaminophen (TYLENOL) 325 MG tablet Take 2 tablets (650 mg total) by mouth every 6 (six) hours as needed for pain.     albuterol (PROVENTIL) 2.5 mg /3 mL (0.083 %)  nebulizer solution Take 3 mL (2.5 mg total) by nebulization every 6 (six) hours as needed for wheezing or shortness of breath.     aluminum-magnesium hydroxide-simethicone (MAALOX ADVANCED) 200-200-20 mg/5 mL Susp Take 30 mL by mouth every 4 (four) hours as needed (gastrointestinal upset).     aspirin 81 mg chewable tablet Chew 1 tablet (81 mg total) daily.     bisacodyl (DULCOLAX) 5 mg EC tablet Take 2 tablets (10 mg total) by mouth daily as needed for constipation.     cetirizine (ZYRTEC) 10 MG tablet Take 10 mg by mouth daily.     furosemide (LASIX) 20 MG tablet Take 20 mg by mouth daily as needed. If weight increases 2 lbs in 24 hours.     furosemide (LASIX) 40 MG tablet Take 40 mg by mouth daily.     gentamicin (GARAMYCIN) 0.1 % ointment Apply 1 application topically daily as needed.     glipiZIDE (GLUCOTROL) 10 MG tablet Take 10 mg by mouth daily before breakfast.     glipiZIDE (GLUCOTROL) 10 MG tablet Take 15 mg by mouth every evening.     hydrocortisone 1 % cream Apply 1 application topically 2 (two) times a day as needed.     Lactobacillus acidophilus 100 mg (1 billion cell) cap Take 1 capsule by mouth 2 (two) times a day. (Patient taking differently: Take 2 capsules by mouth 2 (two) times a day. )     loratadine (CLARITIN) 10 mg tablet Take 10 mg by mouth daily.     metoprolol tartrate (LOPRESSOR) 25 MG tablet Take 1 tablet (25 mg total) by mouth 2 (two) times a day. (Patient taking differently: Take 25 mg by mouth 2 (two) times a day. 12.5mg in am and 25mg in pm.)     miconazole (MICOTIN) 2 % powder Apply 1 application topically daily as needed for itching.     miconazole (SECURA EXTRA THICK) 2 % cream Apply 1 application topically 2 (two) times a day as needed.     miconazole nitrate-zinc ox-pet (VUSION) 0.25-15-81.35 % Oint Apply 1 application topically 2 (two) times a day as needed.     min oil-petrolat (AQUAPHOR) ointment Apply 1 application topically 3 (three) times a day as needed.     nitroglycerin  (NITROSTAT) 0.4 MG SL tablet Place 0.4 mg under the tongue every 5 (five) minutes as needed for chest pain.      NOVOLOG U-100 INSULIN ASPART 100 unit/mL injection Check blood sugar four (4) times daily.  11.9 Type 2 without complications     omeprazole (PRILOSEC) 20 MG capsule Take 20 mg by mouth daily.      simvastatin (ZOCOR) 40 MG tablet Take 40 mg by mouth at bedtime.      spironolactone (ALDACTONE) 25 MG tablet Take 25 mg by mouth daily.     triamcinolone (KENALOG) 0.1 % cream Apply to lower legs and hands two times a day for 10 days only - not to face     urea 10 % lotion Apply 1 application topically daily as needed.     warfarin (COUMADIN/JANTOVEN) 5 MG tablet Take one 5mg tablet tonight and have labs rechecked and coumadin dosed based on INR       Allergies   Allergen Reactions     Latex      Penicillins Rash     Childhood rxn  -- tolerated cefazolin 10/4/18         Review of Systems   No fevers or chills. No headache, lightheadedness or dizziness. No SOB, chest pains or palpitations. Appetite is good. No nausea, vomiting, constipation or diarrhea. No dysuria, frequency, burning or pain with urination.  Patient reports fullness in his head feels like he is listening to sound out of the drum.  Otherwise review of systems are negative.         Physical Exam   PHYSICAL EXAMINATION:  Vital signs: /75, heart rate 72, respirations 20, temperature 98.3, O2 sat 94% on room air.  Current weight 186.6pounds  General: Awake, Alert, oriented x3, appropriately, follows simple commands, conversant  HEENT:PERRLA, Pink conjunctiva, anicteric sclerae, moist oral mucosa.  Moderate cerumen in both ears.  NECK: Supple, without any lymphadenopathy, or masses  CVS:  S1  S2, without murmur or gallop.  Incision to chest intact with glue.  3 Band-Aids over the drain site.  No signs or symptoms of infection.  LUNG: No wheezes, rales or rhonchi. No cough on exam.   BACK: No kyphosis of the thoracic spine  ABDOMEN: Soft,  nontender to palpation, with positive bowel sounds  EXTREMITIES: Good range of motion on both upper and lower extremities,Trace pedal edema, no calf tenderness  SKIN: Pinpoint open areas to lower extremities.  Topical treatment.  NEUROLOGIC: Intact, pulses palpable  PSYCHIATRIC: Mild cognitive impairment.             Labs:   Recent Results (from the past 240 hour(s))   INR   Result Value Ref Range    INR 2.40 (!) 0.9 - 1.1   Basic Metabolic Panel   Result Value Ref Range    Sodium 139 136 - 145 mmol/L    Potassium 4.6 3.5 - 5.0 mmol/L    Chloride 105 98 - 107 mmol/L    CO2 24 22 - 31 mmol/L    Anion Gap, Calculation 10 5 - 18 mmol/L    Glucose 93 70 - 125 mg/dL    Calcium 9.8 8.5 - 10.5 mg/dL    BUN 33 (H) 8 - 28 mg/dL    Creatinine 1.34 (H) 0.70 - 1.30 mg/dL    GFR MDRD Af Amer >60 >60 mL/min/1.73m2    GFR MDRD Non Af Amer 52 (L) >60 mL/min/1.73m2   BNP(B-type Natriuretic Peptide)   Result Value Ref Range     (H) 0 - 72 pg/mL   INR   Result Value Ref Range    INR 2.40 (!) 0.9 - 1.1   Basic Metabolic Panel   Result Value Ref Range    Sodium 137 136 - 145 mmol/L    Potassium 4.9 3.5 - 5.0 mmol/L    Chloride 102 98 - 107 mmol/L    CO2 26 22 - 31 mmol/L    Anion Gap, Calculation 9 5 - 18 mmol/L    Glucose 103 70 - 125 mg/dL    Calcium 10.1 8.5 - 10.5 mg/dL    BUN 31 (H) 8 - 28 mg/dL    Creatinine 1.35 (H) 0.70 - 1.30 mg/dL    GFR MDRD Af Amer >60 >60 mL/min/1.73m2    GFR MDRD Non Af Amer 52 (L) >60 mL/min/1.73m2   INR   Result Value Ref Range    INR 2.10 (!) 0.9 - 1.1         Assessment/Plan:  1. MI, acute, non ST segment elevation (H)     2. S/P CABG x 1     3. Heart failure with preserved ejection fraction (H)     4. Atrial fibrillation (H)     5. Community acquired pneumonia, unspecified laterality     6. Dysphasia       Patient recently hospitalized with non-ST MI who underwent CABG x3 on 10/4/18.  He did have some dysphagia post surgery.  Continues with speech therapy.  He is noncompliant with his  swallowing precautions.  He ate Valenzuela's on the way back from his cardiology appointment today.  Continues to attempt to drink thin liquids.  Recent pneumonia.  He completed his oral antibiotics.  He was also treated for additional fluid overload.  He is down 6-8 pounds.  I did decrease his Lasix.  Atrial fib on chronic anticoagulation.  Rate controlled.  Lower extremity wounds healing.  Continues with a small open area to the drain site.  He continues on topical treatment.  Moderate cerumen buildup in his ears.  He has been receiving Debrox drops.  We will have him follow-up with ENT for washout.. Acute blood loss anemia. Hemoglobin 9.6.  Diabetes satisfactory control.  He had a follow-up with cardiologist today.  No new orders.      Electronically signed by: Ashley Argueta CNP

## 2021-06-21 NOTE — PROGRESS NOTES
Date of Service:11/12/2018    Chief Complaint:   Chief Complaint   Patient presents with     Follow-up       History:    The patient was last seen by me on 10/22//2018 when his ulcer was healed and Tubigrip started for compression. Since his last visit, he was transferred from a TCU to an assisted living.  His ulcer remains healed, but is having some issues with dermatitis on his legs.  He is not wearing his compression on a regular basis.    Current Outpatient Medications   Medication Sig Dispense Refill     acetaminophen (TYLENOL) 325 MG tablet Take 2 tablets (650 mg total) by mouth every 6 (six) hours as needed for pain.  0     albuterol (PROVENTIL) 2.5 mg /3 mL (0.083 %) nebulizer solution Take 3 mL (2.5 mg total) by nebulization every 6 (six) hours as needed for wheezing or shortness of breath.  0     aspirin 81 mg chewable tablet Chew 1 tablet (81 mg total) daily.  0     cetirizine (ZYRTEC) 10 MG tablet Take 10 mg by mouth daily.       furosemide (LASIX) 40 MG tablet Take 40 mg by mouth daily.       gentamicin (GARAMYCIN) 0.1 % ointment Apply 1 application topically daily as needed.       glipiZIDE (GLUCOTROL) 10 MG tablet Take 10 mg by mouth daily before breakfast.       glipiZIDE (GLUCOTROL) 10 MG tablet Take 15 mg by mouth every evening.       Lactobacillus acidophilus 100 mg (1 billion cell) cap Take 1 capsule by mouth 2 (two) times a day. (Patient taking differently: Take 2 capsules by mouth 2 (two) times a day. ) 60 capsule 0     loratadine (CLARITIN) 10 mg tablet Take 10 mg by mouth daily.       metoprolol tartrate (LOPRESSOR) 25 MG tablet Take 1 tablet (25 mg total) by mouth 2 (two) times a day. 60 tablet 2     nitroglycerin (NITROSTAT) 0.4 MG SL tablet Place 0.4 mg under the tongue every 5 (five) minutes as needed for chest pain.        NOVOLOG U-100 INSULIN ASPART 100 unit/mL injection Check blood sugar four (4) times daily.  11.9 Type 2 without complications 10 mL PRN     omeprazole (PRILOSEC) 20 MG  capsule Take 20 mg by mouth daily.        simvastatin (ZOCOR) 40 MG tablet Take 40 mg by mouth at bedtime.        spironolactone (ALDACTONE) 25 MG tablet Take 25 mg by mouth daily.       urea 10 % lotion Apply 1 application topically daily as needed.       warfarin (COUMADIN/JANTOVEN) 5 MG tablet Take one 5mg tablet tonight and have labs rechecked and coumadin dosed based on INR 5 tablet 0     triamcinolone (KENALOG) 0.1 % cream Apply to your legs each leg at bedtime 7 days. 30 g 2     Current Facility-Administered Medications   Medication Dose Route Frequency Provider Last Rate Last Dose     lidocaine 2 % jelly (XYLOCAINE)   Topical PRN Miriam Quinones CNP   1 application at 10/22/18 1349       Allergies   Allergen Reactions     Latex      Penicillins Rash     Childhood rxn  -- tolerated cefazolin 10/4/18       Physical Exam:    Vitals:    11/12/18 1445   BP: 110/56   Pulse: 60   Temp: 98.1  F (36.7  C)    There is no height or weight on file to calculate BMI.    General:  73 y.o. male in no apparent distress.    Psychiatric:  Alert and oriented x 3.  Cooperative.   Integumentary:  Skin is uniformly warm and dry.    Lower extremity edema: minimal    Left lateral leg Ulceration(s): Resolved        Left medial incisional  Ulceration(s): Resolved      Labs:    Lab Results   Component Value Date    SEDRATE 21 (H) 08/31/2018     Lab Results   Component Value Date    CRP 4.5 (H) 09/01/2018     No components found for: CREATINE  Lab Results   Component Value Date    BUN 18 11/07/2018     Lab Results   Component Value Date    HGBA1C 7.2 (H) 08/12/2018         Vasc Edema 5/14/2018 6/21/2018 7/12/2018 10/22/2018 11/12/2018   Right just above MTP 23.6 23 24.2 23.5 24   Right Ankle 27.6 24 24.5 24 25   Right Widest Calf 37.2 35.7 37.8 35.5 39.1   Right Thigh Up 10cm 46.2 51.6 47 41.5 46.2   Left - just above MTP 28.2 27.3 27.8 26.5 27   Left Ankle 25.5 22.7 22.2 23.0 23   Left Widest Calf 35.5 34.2 33.3 32.5 35.1   Left  Thigh Up 10cm 44.9 49 44.7 40.8 40.2       Wound 06/21/18 LEFT FOOT ANTERIOR (Active)   Pre Size Length 1 6/21/2018 11:00 AM   Pre Size Width 1.5 6/21/2018 11:00 AM   Pre Size Depth 0 6/21/2018 11:00 AM   Pre Total Sq cm 1.5 6/21/2018 11:00 AM       VASC Wound 10/22/18 left medial lower leg (Active)   Pre Size Length 0.5 10/22/2018  2:00 PM   Pre Size Width 0.2 10/22/2018  2:00 PM   Pre Size Depth 0.1 10/22/2018  2:00 PM   Pre Total Sq cm 0.1 10/22/2018  2:00 PM       VASC Wound 10/22/18 left lateral leg vein harvest? (Active)   Pre Size Length 4 10/22/2018  2:00 PM   Pre Size Width 1.5 10/22/2018  2:00 PM   Pre Size Depth 0.1 10/22/2018  2:00 PM   Pre Total Sq cm 6 10/22/2018  2:00 PM   Prodcut Used Bactroban 10/22/2018  2:00 PM       VASC Wound 11/12/18 left lateral lower leg (Active)   Pre Size Length 0.5 11/12/2018  2:00 PM   Pre Size Width 0.5 11/12/2018  2:00 PM   Pre Size Depth 0.2 11/12/2018  2:00 PM   Pre Total Sq cm 0.25 11/12/2018  2:00 PM   Undermined n 11/12/2018  2:00 PM   Tunneling n 11/12/2018  2:00 PM       Wound 01/25/18 Amputation Right;3rd digit (Active)       Wound 01/25/18 Non-pressure related ulcer Foot Left (Active)       Wound 08/11/18 Non-pressure related ulcer Leg Right;Anterior (front) (Active)       Incision 10/04/18 Chest (Active)       Incision 10/04/18 Leg Left (Active)       Assessment:  1. Venous stasis dermatitis of both lower extremities  triamcinolone (KENALOG) 0.1 % cream   2. PAD (peripheral artery disease) (H)     3. Non-insulin dependent type 2 diabetes mellitus (H)     4. Acquired lymphedema of lower extremity     5. Medically noncompliant         A new wound was identified today: left lateral and medial leg ulcer      Plan:  1. Venous dermatitis, wrote a prescription for TMC to be applied to his legs daily for seven days.  At that time, he will switch to Vaseline because his is allergic to a lot of creams.    4.  Venous insufficiency,  Stable. Encouraged him to wear his  Velcro compression wraps daily.    5.  Edema, stable.    6.  Diabetes, on Metformin.  6.6 A1c on 11/1/2017.  He has not received his orthotic shoes.  His sister will call [facility].    7.  Treatment: He will apply TMC daily for one week and then discontinue and start using Vaseline daily.    8.  Patient will follow up with me in 3 weeks  for further evaluation and response to the treatment.      Miriam Quinones, APRN, CNP,  [facility]  [phone]

## 2021-06-21 NOTE — PROGRESS NOTES
Code Status:  FULL CODE  Visit Type: Problem Visit     Facility:  [facility] [[id number]]         Facility Type: SNF (Skilled Nursing Facility, TCU)    History of Present Illness: Ever Crane is a 73 y.o. male who I am seeing today for follow-up on the TCU. Pt admitted to [facility] on 9/29/2018 following ED presentation for chest pain and subsequent diagnosis of NSTEMI. He has history of previous CABG, atrial fibrillation, CKD, HLD, HTN, peripheral arterial disease, and DMII. Coronary angiogram demonstrated non-patent grafts and severe coronary artery disease. He was referred to CV surgery for evaluation for possible coronary artery revascularization. Pt underwent 3 vessel CABG on 10/04/2018  and endoscopic vein harvest from the lft leg.  Preoperative patient had atrial field he continues on Coumadin and beta-blocker.  No further episodes.  Dysphagia.  Patient seen by speech therapy and requires nectar thick liquids.  Patient also to found high-grade stenosis of the right internal carotid artery.  He should have a follow-up with vascular in 3 months.    Today patient lying in bed.  He does report some incisional pain.  He denies any shortness of breath.  He was seen by Dr. Valenzuela last week.  Chest x-ray was obtained which showed a right lower lobe infiltrate.  He was started on doxycycline for 7 days.  He does continue on this.  He does have some rhonchi in the right lower lobe.  He is afebrile.  He does have 1-2+ lower extremity edema.  However he is down about 10 pounds.  Hemoglobin 9.6.      Active Ambulatory Problems     Diagnosis Date Noted     Dyslipidemia, goal LDL below 70      Persistent atrial fibrillation (H)      Typical atrial flutter (H)      Venous hypertension, chronic, bilateral 08/10/2017     Polyneuropathy associated with underlying disease (H) 08/10/2017     Non-insulin dependent type 2 diabetes mellitus (H)      Acquired lymphedema of lower extremity 10/16/2017      Coronary artery disease due to lipid rich plaque      PAD (peripheral artery disease) (H) 11/14/2017     Heart failure with preserved ejection fraction, NYHA class II (H) 01/25/2018     MESHA (acute kidney injury) (H) 01/25/2018     Benign essential hypertension      Wound, open, foot, left, sequela 06/20/2018     Medically noncompliant 06/20/2018     CAP (community acquired pneumonia) 07/02/2018     Cellulitis 08/11/2018     Acute conjunctivitis of left eye, unspecified acute conjunctivitis type      Allergic conjunctivitis, left      C. difficile colitis      Bilateral lower leg cellulitis 08/31/2018     Non-STEMI (non-ST elevated myocardial infarction) (H)      Abnormal CT scan, chest      Carotid stenosis, asymptomatic, right      S/P CABG x 3      Resolved Ambulatory Problems     Diagnosis Date Noted     Essential hypertension      Leg ulcer, left (H) 12/30/2014     Leg ulcer, left, limited to breakdown of skin (H) 08/10/2017     Diabetic ulcer of both feet (H) 10/31/2017     Ischemic cardiomyopathy      Hyperkalemia 11/14/2017     Gangrene of left foot (H)      S/P transmetatarsal amputation of foot, left (H) 11/27/2017     Ulcer of foot, limited to breakdown of skin, unspecified laterality (H) 01/08/2018     Chest pain 09/29/2018     Past Medical History:   Diagnosis Date     Atrial flutter (H)      Candidiasis of perineum 1/3/2018     Coronary artery disease due to lipid rich plaque 2000     Diabetic ulcer of both feet (H) 10/31/2017     Dyslexia      Dyslipidemia, goal LDL below 70 2000     Essential hypertension      Gangrene of left foot (H)      Neuropathy (H)      Paroxysmal Atrial Fibrillation      Type 2 diabetes mellitus, without long-term current use of insulin (H)      Unable to function independently 11/13/2017       Current Outpatient Prescriptions   Medication Sig     acetaminophen (TYLENOL) 325 MG tablet Take 2 tablets (650 mg total) by mouth every 6 (six) hours as needed for pain.     albuterol  (PROVENTIL) 2.5 mg /3 mL (0.083 %) nebulizer solution Take 3 mL (2.5 mg total) by nebulization every 6 (six) hours as needed for wheezing or shortness of breath.     aluminum-magnesium hydroxide-simethicone (MAALOX ADVANCED) 200-200-20 mg/5 mL Susp Take 30 mL by mouth every 4 (four) hours as needed (gastrointestinal upset).     aspirin 81 mg chewable tablet Chew 1 tablet (81 mg total) daily.     bisacodyl (DULCOLAX) 5 mg EC tablet Take 2 tablets (10 mg total) by mouth daily as needed for constipation.     cetirizine (ZYRTEC) 10 MG tablet Take 10 mg by mouth daily.     furosemide (LASIX) 20 MG tablet Take 20 mg by mouth daily as needed. If weight increases 2 lbs in 24 hours.     furosemide (LASIX) 40 MG tablet Take 40 mg by mouth daily.     gentamicin (GARAMYCIN) 0.1 % ointment Apply 1 application topically daily as needed.     glipiZIDE (GLUCOTROL) 10 MG tablet Take 10 mg by mouth daily before breakfast.     glipiZIDE (GLUCOTROL) 10 MG tablet Take 15 mg by mouth every evening.     hydrocortisone 1 % cream Apply 1 application topically 2 (two) times a day as needed.     Lactobacillus acidophilus 100 mg (1 billion cell) cap Take 1 capsule by mouth 2 (two) times a day. (Patient taking differently: Take 2 capsules by mouth 2 (two) times a day. )     loratadine (CLARITIN) 10 mg tablet Take 10 mg by mouth daily.     metoprolol tartrate (LOPRESSOR) 25 MG tablet Take 1 tablet (25 mg total) by mouth 2 (two) times a day.     miconazole (MICOTIN) 2 % powder Apply 1 application topically daily as needed for itching.     miconazole (SECURA EXTRA THICK) 2 % cream Apply 1 application topically 2 (two) times a day as needed.     miconazole nitrate-zinc ox-pet (VUSION) 0.25-15-81.35 % Oint Apply 1 application topically 2 (two) times a day as needed.     min oil-petrolat (AQUAPHOR) ointment Apply 1 application topically 3 (three) times a day as needed.     nitroglycerin (NITROSTAT) 0.4 MG SL tablet Place 0.4 mg under the tongue every  5 (five) minutes as needed for chest pain.      NOVOLOG U-100 INSULIN ASPART 100 unit/mL injection Check blood sugar four (4) times daily.  11.9 Type 2 without complications     omeprazole (PRILOSEC) 20 MG capsule Take 20 mg by mouth daily.      simvastatin (ZOCOR) 40 MG tablet Take 40 mg by mouth at bedtime.      spironolactone (ALDACTONE) 25 MG tablet Take 25 mg by mouth daily.     triamcinolone (KENALOG) 0.1 % cream Apply to lower legs and hands two times a day for 10 days only - not to face     urea 10 % lotion Apply 1 application topically daily as needed.     warfarin (COUMADIN/JANTOVEN) 5 MG tablet Take one 5mg tablet tonight and have labs rechecked and coumadin dosed based on INR       Allergies   Allergen Reactions     Latex      Penicillins Rash     Childhood rxn  -- tolerated cefazolin 10/4/18         Review of Systems   No fevers or chills. No headache, lightheadedness or dizziness. No SOB, chest pains or palpitations. Appetite is good. No nausea, vomiting, constipation or diarrhea. No dysuria, frequency, burning or pain with urination. Otherwise review of systems are negative.         Physical Exam   PHYSICAL EXAMINATION:  Vital signs: /77, 68, respirations 20, O2 sat 90% on room air, temperature 97.6, current weight 184.4 pounds  General: Awake, Alert, oriented x3, appropriately, follows simple commands, conversant  HEENT:PERRLA, Pink conjunctiva, anicteric sclerae, moist oral mucosa  NECK: Supple, without any lymphadenopathy, or masses  CVS:  S1  S2, without murmur or gallop.  Incision to chest intact with glue.  3 Band-Aids over the drain sites.  No signs or symptoms of infection.  LUNG: Right lower lobe bronchi.  Patient will let nonproductive cough.  No shortness of breath at rest.  BACK: No kyphosis of the thoracic spine  ABDOMEN: Soft, nontender to palpation, with positive bowel sounds  EXTREMITIES: Good range of motion on both upper and lower extremities,1-2+ pedal edema, no calf  tenderness  SKIN: Warm and dry, no rashes or erythema noted  NEUROLOGIC: Intact, pulses palpable  PSYCHIATRIC: Cognition intact            Labs:   Recent Results (from the past 240 hour(s))   POCT Glucose   Result Value Ref Range    Glucose,  mg/dL   Basic Metabolic Panel   Result Value Ref Range    Sodium 143 136 - 145 mmol/L    Potassium 4.1 3.5 - 5.0 mmol/L    Chloride 108 (H) 98 - 107 mmol/L    CO2 24 22 - 31 mmol/L    Anion Gap, Calculation 11 5 - 18 mmol/L    Glucose 131 (H) 70 - 125 mg/dL    Calcium 9.6 8.5 - 10.5 mg/dL    BUN 33 (H) 8 - 28 mg/dL    Creatinine 1.45 (H) 0.70 - 1.30 mg/dL    GFR MDRD Af Amer 58 (L) >60 mL/min/1.73m2    GFR MDRD Non Af Amer 48 (L) >60 mL/min/1.73m2   HM1 (CBC with Diff)   Result Value Ref Range    WBC 7.3 4.0 - 11.0 thou/uL    RBC 2.99 (L) 4.40 - 6.20 mill/uL    Hemoglobin 8.2 (L) 14.0 - 18.0 g/dL    Hematocrit 27.0 (L) 40.0 - 54.0 %    MCV 90 80 - 100 fL    MCH 27.4 27.0 - 34.0 pg    MCHC 30.4 (L) 32.0 - 36.0 g/dL    RDW 14.4 11.0 - 14.5 %    Platelets 117 (L) 140 - 440 thou/uL    MPV 11.1 8.5 - 12.5 fL    Neutrophils % 76 (H) 50 - 70 %    Lymphocytes % 12 (L) 20 - 40 %    Monocytes % 8 2 - 10 %    Eosinophils % 4 0 - 6 %    Basophils % 1 0 - 2 %    Neutrophils Absolute 5.5 2.0 - 7.7 thou/uL    Lymphocytes Absolute 0.9 0.8 - 4.4 thou/uL    Monocytes Absolute 0.6 0.0 - 0.9 thou/uL    Eosinophils Absolute 0.3 0.0 - 0.4 thou/uL    Basophils Absolute 0.0 0.0 - 0.2 thou/uL   POCT Glucose   Result Value Ref Range    Glucose,  mg/dL   POCT Glucose   Result Value Ref Range    Glucose,  mg/dL   Magnesium   Result Value Ref Range    Magnesium 2.4 1.8 - 2.6 mg/dL   INR   Result Value Ref Range    INR 1.21 (H) 0.90 - 1.10   POCT Glucose   Result Value Ref Range    Glucose,  mg/dL   POCT Glucose   Result Value Ref Range    Glucose, POC 91 mg/dL   POCT Glucose   Result Value Ref Range    Glucose,  mg/dL   POCT Glucose   Result Value Ref Range    Glucose,   mg/dL   POCT Glucose   Result Value Ref Range    Glucose,  mg/dL   Basic Metabolic Panel   Result Value Ref Range    Sodium 142 136 - 145 mmol/L    Potassium 4.3 3.5 - 5.0 mmol/L    Chloride 104 98 - 107 mmol/L    CO2 29 22 - 31 mmol/L    Anion Gap, Calculation 9 5 - 18 mmol/L    Glucose 107 70 - 125 mg/dL    Calcium 9.9 8.5 - 10.5 mg/dL    BUN 30 (H) 8 - 28 mg/dL    Creatinine 1.25 0.70 - 1.30 mg/dL    GFR MDRD Af Amer >60 >60 mL/min/1.73m2    GFR MDRD Non Af Amer 57 (L) >60 mL/min/1.73m2   Potassium - Next AM   Result Value Ref Range    Potassium 4.3 3.5 - 5.0 mmol/L   INR   Result Value Ref Range    INR 1.15 (H) 0.90 - 1.10   HM1 (CBC with Diff)   Result Value Ref Range    WBC 5.9 4.0 - 11.0 thou/uL    RBC 3.14 (L) 4.40 - 6.20 mill/uL    Hemoglobin 8.7 (L) 14.0 - 18.0 g/dL    Hematocrit 27.8 (L) 40.0 - 54.0 %    MCV 89 80 - 100 fL    MCH 27.7 27.0 - 34.0 pg    MCHC 31.3 (L) 32.0 - 36.0 g/dL    RDW 14.2 11.0 - 14.5 %    Platelets 165 140 - 440 thou/uL    MPV 10.9 8.5 - 12.5 fL    Neutrophils % 73 (H) 50 - 70 %    Lymphocytes % 14 (L) 20 - 40 %    Monocytes % 7 2 - 10 %    Eosinophils % 5 0 - 6 %    Basophils % 1 0 - 2 %    Neutrophils Absolute 4.3 2.0 - 7.7 thou/uL    Lymphocytes Absolute 0.9 0.8 - 4.4 thou/uL    Monocytes Absolute 0.4 0.0 - 0.9 thou/uL    Eosinophils Absolute 0.3 0.0 - 0.4 thou/uL    Basophils Absolute 0.0 0.0 - 0.2 thou/uL   Magnesium   Result Value Ref Range    Magnesium 2.2 1.8 - 2.6 mg/dL   POCT Glucose   Result Value Ref Range    Glucose,  mg/dL   POCT Glucose   Result Value Ref Range    Glucose,  mg/dL   POCT Glucose   Result Value Ref Range    Glucose,  mg/dL   POCT Glucose   Result Value Ref Range    Glucose,  mg/dL   Basic Metabolic Panel   Result Value Ref Range    Sodium 139 136 - 145 mmol/L    Potassium 4.1 3.5 - 5.0 mmol/L    Chloride 103 98 - 107 mmol/L    CO2 25 22 - 31 mmol/L    Anion Gap, Calculation 11 5 - 18 mmol/L    Glucose 149 (H)  70 - 125 mg/dL    Calcium 9.6 8.5 - 10.5 mg/dL    BUN 26 8 - 28 mg/dL    Creatinine 1.12 0.70 - 1.30 mg/dL    GFR MDRD Af Amer >60 >60 mL/min/1.73m2    GFR MDRD Non Af Amer >60 >60 mL/min/1.73m2   INR   Result Value Ref Range    INR 1.24 (H) 0.90 - 1.10   Potassium - Next AM   Result Value Ref Range    Potassium 4.1 3.5 - 5.0 mmol/L   Magnesium   Result Value Ref Range    Magnesium 2.1 1.8 - 2.6 mg/dL   HM1 (CBC with Diff)   Result Value Ref Range    WBC 5.1 4.0 - 11.0 thou/uL    RBC 3.21 (L) 4.40 - 6.20 mill/uL    Hemoglobin 8.7 (L) 14.0 - 18.0 g/dL    Hematocrit 28.5 (L) 40.0 - 54.0 %    MCV 89 80 - 100 fL    MCH 27.1 27.0 - 34.0 pg    MCHC 30.5 (L) 32.0 - 36.0 g/dL    RDW 14.2 11.0 - 14.5 %    Platelets 164 140 - 440 thou/uL    MPV 10.8 8.5 - 12.5 fL    Neutrophils % 70 50 - 70 %    Lymphocytes % 15 (L) 20 - 40 %    Monocytes % 9 2 - 10 %    Eosinophils % 5 0 - 6 %    Basophils % 1 0 - 2 %    Neutrophils Absolute 3.5 2.0 - 7.7 thou/uL    Lymphocytes Absolute 0.8 0.8 - 4.4 thou/uL    Monocytes Absolute 0.5 0.0 - 0.9 thou/uL    Eosinophils Absolute 0.3 0.0 - 0.4 thou/uL    Basophils Absolute 0.0 0.0 - 0.2 thou/uL   POCT Glucose   Result Value Ref Range    Glucose,  mg/dL   POCT Glucose   Result Value Ref Range    Glucose,  mg/dL   INR   Result Value Ref Range    INR 1.60 (!) 0.9 - 1.1   Basic Metabolic Panel   Result Value Ref Range    Sodium 141 136 - 145 mmol/L    Potassium 3.9 3.5 - 5.0 mmol/L    Chloride 105 98 - 107 mmol/L    CO2 27 22 - 31 mmol/L    Anion Gap, Calculation 9 5 - 18 mmol/L    Glucose 108 70 - 125 mg/dL    Calcium 9.6 8.5 - 10.5 mg/dL    BUN 24 8 - 28 mg/dL    Creatinine 1.20 0.70 - 1.30 mg/dL    GFR MDRD Af Amer >60 >60 mL/min/1.73m2    GFR MDRD Non Af Amer 59 (L) >60 mL/min/1.73m2   HM2(CBC w/o Differential)   Result Value Ref Range    WBC 6.6 4.0 - 11.0 thou/uL    RBC 3.52 (L) 4.40 - 6.20 mill/uL    Hemoglobin 9.6 (L) 14.0 - 18.0 g/dL    Hematocrit 31.1 (L) 40.0 - 54.0 %    MCV  88 80 - 100 fL    MCH 27.3 27.0 - 34.0 pg    MCHC 30.9 (L) 32.0 - 36.0 g/dL    RDW 14.4 11.0 - 14.5 %    Platelets 234 140 - 440 thou/uL    MPV 11.0 8.5 - 12.5 fL   INR   Result Value Ref Range    INR 1.86 (H) 0.90 - 1.10   INR   Result Value Ref Range    INR 2.90 (!) 0.9 - 1.1         Assessment/Plan:    1. MI, acute, non ST segment elevation (H)     2. S/P CABG x 1     3. Dysphasia     4. Atrial fibrillation (H)     5. Carotid stenosis, right     6. Community acquired pneumonia, unspecified laterality     7. Heart failure with preserved ejection fraction (H)     8. Type 2 diabetes mellitus with diabetic peripheral angiopathy and gangrene, without long-term current use of insulin (H)     9. Coronary artery disease due to lipid rich plaque       Patient recently hospitalized with non-ST MI who underwent CABG x3 on 10/4/18.  He did have some dysphagia post surgery.  Continues with speech therapy.  Currently on thickened liquids.  Very acquired pneumonia.  Continues on doxycycline.  X-rays did show a infiltrate.  He has rhonchi in the right lower lobe.  1-2+ lower extremity.  Currently on 40 mg of Lasix in the a.m.  I will have them give him another additional 20 today.  Will also order nebs 3 times daily.  Daily weights.  Does have a history of heart failure.  Appears slightly fluid overloaded.  Type 2 diabetes.  Continue to monitor blood sugars.  Oral control.  Hemoglobin 9.6.  Will repeat laboratory late next week.      35 minutes spent of which greater than 50% was face to face communication with the patient about above plan of care    Electronically signed by: Ashley Argueta, DANIELA

## 2021-06-21 NOTE — PROGRESS NOTES
Ever Crane  1945  [id number]    Reason for visit: Ever Crane is 73 y.o. year old male seen in clinic for routine follow-up after cardiac surgery. Hospital course was complicated by some intermittent delirium, swallowing issues . Patient was discharged to TCU.  He is on coumadin for hx of atrial fib, no post op fib noted on chart.     Procedure Re do sternotomy, CABG x 3  DOS 10/4   Day of discharge: 10/10/2018  Discharge to TCU    Readmissions: none    Followed up with Noé Newton MD on   Follow up with Cardiology scheduled with Dr. Moe  Cardiac Rehab start date - will consider once he is back in assisted living.     Assessment  Exam  There were no vitals taken for this visit.  Gen: Alert, oriented, pleasant, no acute distress  CV: RRR without murmur or rub, no appreciable edema  Lungs: CTAB, non-labored breathing on room air  GI: Abdomen soft, non-tender, non-distended  Sternum is stable no movement  Incisions: Chest and leg incisions well healing; C/D/I without erythema, purulence, swelling    Appetite: good   Bowels: regular with occasional constipation - on dulcolax.   Weight: 189   INR 2.1     Plan    Ever Crane is a 73 y.o. year old male status post Redo sternotomy, CABG  who returns to clinic for post-op visit. He has what sounds like some nasal congestion and drainage or need to clear his throat. He is being followed by speech pathology for dysphagia and swallowing concerns with a diet of thickened liquids for aspiration concerns.     Reviewed with patient:  Vital signs /66 (Patient Site: Left Arm, Patient Position: Sitting, Cuff Size: Adult Regular)  Pulse 68  Temp 97.6  F (36.4  C)  Resp 14  Ht 6' (1.829 m)  Wt 189 lb (85.7 kg)  BMI 25.63 kg/m2    Medications no changes     1. Surgically doing well. Incisions are healing well with no signs of infection. Sternum is stable.  2. Vital signs are stable.   3. Follow-up with cardiology as scheduled with Dr. Moe.  4. Continue cardiac  rehab until completed.  5. Continue sternal precautions Jan 4, 2018  6. May start driving- no plans at this time  7. Return to work N/A  8. No need for further follow-up with CV surgery unless concerns.      Feel free to call our office with questions. [phone]

## 2021-06-21 NOTE — PROGRESS NOTES
ITP ASSESSMENT   Assessment Day: Initial    Session Number: 1  Precautions: S/P MI, CABG    Diagnosis: MI;CAB    Risk Stratification: Medium    Referring Provider: Margaux Fierro PA-C   ITP: Dr. Arriaza  EXERCISE  Exercise Assessment: Initial     Pt attended initial session of Phase II Cardiac Rehab. Tolerated 10 Minutes of exercise at 2 mets without any complaints.                             Exercise Plan  Goals Next 30 days  Leisure: Walk 2-3x/week at home for 10-15 Minutes    Work: Resume PT Work      Education Goals: All goals in this section met    Education Goals Met: Patient can state cardiac s/s and appropriate emergency response.;Has system for taking medication.;Medication review.      Exercise Prescription  Exercise Mode: Treadmill;Bike;Nustep;Arm Erg.    Frequency: 2x/week    Duration: 20-40 Minutes    Intensity / THR: 20-30 beats above resting heart rate    RPE 11-14  Progression / Met level: 2-2.5    Resistive Training?: No (Will start soon)      Current Exercise (mins/week): 1      Interventions  Home Exercise:  Mode: Walk/Bike    Frequency: 3-5x/week    Duration: 10-15 Minutes      Education Material : Educational videos;Provide written material;Individual education and counseling;Offer educational classes      Education Completed  Exercise Education Completed: Cardiac Anatomy;Signs and Symptoms;Medication review;RPE;Emergency Plan;Home Exercise;Warm up/cool down;FITT Principles;BP/HR Reponse to exercise;Benefits of Exercise;End point of exercise              Exercise Follow-up/Discharge  Follow up/Discharge: Reviewed home exercise program. Encouraged to increase as tolerated.   NUTRITION  Nutrition Assessment: Initial      Nutrition Risk Factors:  Nutrition Risk Factors: Diabetes;Dyslipidemia  HbA1c: 7.2 (8/12/18)  Monitors blood sugar at home: Yes  Frequency: 2-3x/daily  (Staff at Assisted Living checks)  Cholesterol: 115 (8/25/18)  LDL: 63  HDL: 26  Triglycerides: 130      Nutrition  Plan  Interventions  Other Nutrition Intervention: Diet Class;Therapist/Pt Discussion;Educational Videos;Provide with Written Material    Education Completed  Nutrition Education Completed: Risk factor overview;Low sodium diet;Weight management      Goals  Nutrition Goals (Next 30 days): Patient can identify their risk factors for CAD;Patient will follow a low sodium diet;Patient will follow a low saturated fat diet      Goals Met  Nutrition Goals Met: Completed Nutritional Risk Screen;Provided Rate your Plate Survey;Reviewed Dietitian schedule      Height, Weight, and  BMI  Weight: 219 lb (99.3 kg)  Height: 6' (1.829 m)  BMI: 29.7      Nutrition Follow-up  Follow-up/Discharge: Diet survey given to complete and return. Once returned we will set up appt with the dietician.         Other Risk Factors  Other Risk Factor Assessment: Initial      HTN Risk Factor: Hypertension      Pre Exercise BP: 115/56  Post Exercise BP: 121/62      Hypertension Plan  Goals  HTN Goals: Patient demonstrates understanding of HTN, no goals identified for the next 30 days      Goals Met  HTN Goals Met: Follow low sodium diet;Take medication as prescribed;Exercises regularly      HTN Interventions  HTN Interventions: Therapist/patient discussion;Provide written material;Offer educational videos;Offer educational classes      HTN Education Completed  HTN Education Completed: Low sodium diet;Medication review;Risk factor overview      Tobacco Risk Factor: NA (Quit 2000)    Risk Factor Follow-up   Follow-up/Discharge: Pt states understanding of risk factors.     PSYCHOSOCIAL  Psychosocial Assessment: Initial       [facility] BELL Q of L Summary Score: 17      AMARILIS-D Score: 3      Psychosocial Risk Factor: Stress      Psychosocial Plan  Interventions  If AMARILIS-D > 15 send letter to MD  Interventions: Offer educational videos and classes;Provide written material;Individual education and counseling      Education Completed  Education Completed:  Relaxation/Coping Techniques;S/S of depression;Effects of stress on body      Goals  Goals (Next 30 days): Practicing stress management skills      Goals Met  Goals Met: Identified Support system;Oriented to stress management classes;Identify stressors      Psychosocial Follow-up  Follow-up/Discharge: Pt states he is managing stress well       Patient involved in Goal setting?: Yes      Signature: _____________________________________________________________    Date: __________________    Time: __________________

## 2021-06-21 NOTE — PROGRESS NOTES
"Speech Language/Pathology  Videofluoroscopic Swallow Study       Problem:  Patient Active Problem List   Diagnosis     Dyslipidemia, goal LDL below 70     Persistent atrial fibrillation (H)     Typical atrial flutter (H)     Venous hypertension, chronic, bilateral     Polyneuropathy associated with underlying disease (H)     Non-insulin dependent type 2 diabetes mellitus (H)     Acquired lymphedema of lower extremity     Coronary artery disease due to lipid rich plaque     PAD (peripheral artery disease) (H)     Heart failure with preserved ejection fraction, NYHA class II (H)     MESHA (acute kidney injury) (H)     Benign essential hypertension     Wound, open, foot, left, sequela     Medically noncompliant     CAP (community acquired pneumonia)     Cellulitis     Acute conjunctivitis of left eye, unspecified acute conjunctivitis type     Allergic conjunctivitis, left     C. difficile colitis     Bilateral lower leg cellulitis     Non-STEMI (non-ST elevated myocardial infarction) (H)     Abnormal CT scan, chest     Carotid stenosis, asymptomatic, right     S/P CABG x 3       Onset Date: 10/30/2018 (order date)  Reason for Evaluation: Reassess swallow function and determine safety of thin liquids  Pertinent History: As above.  Prior Video Swallow Study completed on 10/6/18.  At that time, patient demonstrated aspiration with thin liquid. He had a weak, delayed throat clear response.  Current Diet: Regular textures and nectar-thick liquids (presumed diet)  Baseline Diet: Regular textures and thin liquids     Patient is a 72 y/o male referred due to recent dysphagia.  Per Ashley Argueta CNP progress note dated 10/30/18, \"Patient recently hospitalized with non-ST MI who underwent CABG x3 on 10/4/18.  He did have some dysphagia post surgery.  Continues with speech therapy.  He is noncompliant with his swallowing precautions.  He ate [employer] on the way back from his cardiology appointment today.  Continues to attempt to " "drink thin liquids.  Recent pneumonia.\"  Patient does not feel that he has a swallowing issue.  He did not recall having participated in a previous Video Swallow Study at [facility], and seems quite confused regarding his recent admission there, as well as his medical history in general.  The purpose of this study is to reassess patient's oropharyngeal swallow and determine the safety of potential upgrade to thin liquids.     Patient presents as pleasant and cooperative during this evaluation.  He reports that his sister drove him to today's appointment, but she was not present during evaluation.  Patient is a poor historian.  Majority of history is taken from a review of recent records from [facility].     Patient's speech was notable for hyponasal resonance, but he denies having a cold or allergies.    An  was not applicable    Patient was given honey-thick, nectar-thick, thin, and pureed consistencies of barium.    Oral Phase:    Dentition/Oral hygiene: Patient appears to missing several molars; especially lower molars. Oral hygiene was adequate.    Bolus prep and oral control were not impaired.    Anterior-Posterior transit was not impaired.    Premature spillage did not occur with any consistency.    Tongue base retraction was not impaired.    Oral stasis did not occur with any consistency.    Pharyngeal Phase:    Aspiration did not occur with any consistency.     Deep laryngeal penetration occurred with nectar-thick liquid.  Patient had a spontaneous throat clear at the vocal folds.  This was effective at clearing penetrated material.  Patient also experienced intermittent transient laryngeal penetration with thin and honey-thick liquid.  This was shallow and cleared spontaneously.    The chin tuck strategy was trialed, but was ineffective at reducing incidence of laryngeal penetration.    Swallow response was delayed with all consistencies. This resulted in pourover to the pyriform " sinuses with thin, nectar-thick, and honey-thick liquid.  With nectar and honey, there was also one episode each of pourover directly into the laryngeal vestibule.  This resulted in deep penetration to the vocal folds with nectar.  With puree consistency, the swallow response occurred once the vallecular space was filled to near-capacity.    Epiglottic movement was near-complete to complete across texture trials.  The epiglottis was slow to return to resting position.     Pharyngeal stasis did not occur with any consistency.    Pharyngeal constriction was not impaired.    Hyolaryngeal elevation was mildly reduced. Hyolaryngeal excursion was mildly to moderately reduced.    Cricopharyngeal function was not impaired. Cervical esophageal function was not impaired.    Assessment:    Patient demonstrated no oral dysphagia and mild pharyngeal dysphagia.    Patient is at moderate aspiration risk with thin, nectar-thick, and honey-thick liquid due to delayed timing of the swallow response.  There does not appear to be a significant difference in aspiration risk between nectar and thin and, interestingly, laryngeal penetration was less significant with thin than with nectar during this study.  After study, SLP observed patient with 4 oz water via cup.  Patient was able to follow cues to take single, small sips.  Slight throat clear noted x1, but no other clinical signs or symptoms of aspiration were observed.    Rehab potential is fair based on prior level of function, evaluation results, medical status and behavior.    Recommendations:    Plan:  Patient appears appropriate to advance to thin liquids.  Prior to formal liquid upgrade, recommend that primary SLP observe patient with a larger volume (8-12 oz) of thin liquid to verify tolerance and reinforce strategy for small, single sips.      Strategies:  Patient to follow standard safe swallowing precautions, including sitting fully upright for all intake, eating slowly, and  taking one small bite or sip at a time.  No straws with liquids due to increased aspiration risk.    Continue Speech Therapy at TCU.  Frequency and duration to be determined by primary SLP.    Referrals: N/A     Reviewed history of swallow problem with patient and verbally explained roles of SLP and radiologist.  Verbally explained process of VFSS prior to administration of barium.  Verbally explained results and recommendations to pt/caregiver.  SLP answered questions and stated that a copy of this report will be faxed to [facility].  Patient verbalized understanding.    27 dysphagia minutes    Isela Thomas MA, CCC-SLP

## 2021-06-21 NOTE — PROGRESS NOTES
Code Status:  FULL CODE  Visit Type: Problem Visit     Facility:  [facility] [[id number]]         Facility Type: SNF (Skilled Nursing Facility, TCU)    History of Present Illness: Ever Crane is a 73 y.o. male who I am seeing today for follow-up on the TCU. Pt admitted to [facility] on 9/29/2018 following ED presentation for chest pain and subsequent diagnosis of NSTEMI. He has history of previous CABG, atrial fibrillation, CKD, HLD, HTN, peripheral arterial disease, and DMII. Coronary angiogram demonstrated non-patent grafts and severe coronary artery disease. He was referred to CV surgery for evaluation for possible coronary artery revascularization. Pt underwent 3 vessel CABG on 10/04/2018  and endoscopic vein harvest from the lft leg.  Preoperative patient had atrial field he continues on Coumadin and beta-blocker.  No further episodes.  Dysphagia.  Patient seen by speech therapy and requires nectar thick liquids.  Patient also to found high-grade stenosis of the right internal carotid artery.  He should have a follow-up with vascular in 3 months.    Today patient sitting up on bed.  Occasional cough.  He is noncompliant with his dietary restrictions.  He has a repeat video swallow today.  Denies any chest pain.  Drain sites improving.  Lower extremity wounds resolved.  Continues to report not being able to hear very well.  He does have a follow-up appointment with the ENT.        Active Ambulatory Problems     Diagnosis Date Noted     Dyslipidemia, goal LDL below 70      Persistent atrial fibrillation (H)      Typical atrial flutter (H)      Venous hypertension, chronic, bilateral 08/10/2017     Polyneuropathy associated with underlying disease (H) 08/10/2017     Non-insulin dependent type 2 diabetes mellitus (H)      Acquired lymphedema of lower extremity 10/16/2017     Coronary artery disease due to lipid rich plaque      PAD (peripheral artery disease) (H) 11/14/2017     Heart  failure with preserved ejection fraction, NYHA class II (H) 01/25/2018     MESHA (acute kidney injury) (H) 01/25/2018     Benign essential hypertension      Wound, open, foot, left, sequela 06/20/2018     Medically noncompliant 06/20/2018     CAP (community acquired pneumonia) 07/02/2018     Cellulitis 08/11/2018     Acute conjunctivitis of left eye, unspecified acute conjunctivitis type      Allergic conjunctivitis, left      C. difficile colitis      Bilateral lower leg cellulitis 08/31/2018     Non-STEMI (non-ST elevated myocardial infarction) (H)      Abnormal CT scan, chest      Carotid stenosis, asymptomatic, right      S/P CABG x 3      Resolved Ambulatory Problems     Diagnosis Date Noted     Essential hypertension      Leg ulcer, left (H) 12/30/2014     Leg ulcer, left, limited to breakdown of skin (H) 08/10/2017     Diabetic ulcer of both feet (H) 10/31/2017     Ischemic cardiomyopathy      Hyperkalemia 11/14/2017     Gangrene of left foot (H)      S/P transmetatarsal amputation of foot, left (H) 11/27/2017     Ulcer of foot, limited to breakdown of skin, unspecified laterality (H) 01/08/2018     Chest pain 09/29/2018     Past Medical History:   Diagnosis Date     Atrial flutter (H)      Candidiasis of perineum 1/3/2018     Coronary artery disease due to lipid rich plaque 2000     Diabetic ulcer of both feet (H) 10/31/2017     Dyslexia      Dyslipidemia, goal LDL below 70 2000     Essential hypertension      Gangrene of left foot (H)      Neuropathy (H)      Paroxysmal Atrial Fibrillation      Type 2 diabetes mellitus, without long-term current use of insulin (H)      Unable to function independently 11/13/2017       Current Outpatient Prescriptions   Medication Sig     acetaminophen (TYLENOL) 325 MG tablet Take 2 tablets (650 mg total) by mouth every 6 (six) hours as needed for pain.     albuterol (PROVENTIL) 2.5 mg /3 mL (0.083 %) nebulizer solution Take 3 mL (2.5 mg total) by nebulization every 6 (six)  hours as needed for wheezing or shortness of breath.     aluminum-magnesium hydroxide-simethicone (MAALOX ADVANCED) 200-200-20 mg/5 mL Susp Take 30 mL by mouth every 4 (four) hours as needed (gastrointestinal upset).     aspirin 81 mg chewable tablet Chew 1 tablet (81 mg total) daily.     bisacodyl (DULCOLAX) 5 mg EC tablet Take 2 tablets (10 mg total) by mouth daily as needed for constipation.     cetirizine (ZYRTEC) 10 MG tablet Take 10 mg by mouth daily.     furosemide (LASIX) 20 MG tablet Take 20 mg by mouth daily as needed. If weight increases 2 lbs in 24 hours.     furosemide (LASIX) 40 MG tablet Take 40 mg by mouth daily.     gentamicin (GARAMYCIN) 0.1 % ointment Apply 1 application topically daily as needed.     glipiZIDE (GLUCOTROL) 10 MG tablet Take 10 mg by mouth daily before breakfast.     glipiZIDE (GLUCOTROL) 10 MG tablet Take 15 mg by mouth every evening.     hydrocortisone 1 % cream Apply 1 application topically 2 (two) times a day as needed.     Lactobacillus acidophilus 100 mg (1 billion cell) cap Take 1 capsule by mouth 2 (two) times a day. (Patient taking differently: Take 2 capsules by mouth 2 (two) times a day. )     loratadine (CLARITIN) 10 mg tablet Take 10 mg by mouth daily.     metoprolol tartrate (LOPRESSOR) 25 MG tablet Take 1 tablet (25 mg total) by mouth 2 (two) times a day. (Patient taking differently: Take 25 mg by mouth 2 (two) times a day. 12.5mg in am and 25mg in pm.)     miconazole (MICOTIN) 2 % powder Apply 1 application topically daily as needed for itching.     miconazole (SECURA EXTRA THICK) 2 % cream Apply 1 application topically 2 (two) times a day as needed.     miconazole nitrate-zinc ox-pet (VUSION) 0.25-15-81.35 % Oint Apply 1 application topically 2 (two) times a day as needed.     min oil-petrolat (AQUAPHOR) ointment Apply 1 application topically 3 (three) times a day as needed.     nitroglycerin (NITROSTAT) 0.4 MG SL tablet Place 0.4 mg under the tongue every 5 (five)  minutes as needed for chest pain.      NOVOLOG U-100 INSULIN ASPART 100 unit/mL injection Check blood sugar four (4) times daily.  11.9 Type 2 without complications     omeprazole (PRILOSEC) 20 MG capsule Take 20 mg by mouth daily.      simvastatin (ZOCOR) 40 MG tablet Take 40 mg by mouth at bedtime.      spironolactone (ALDACTONE) 25 MG tablet Take 25 mg by mouth daily.     triamcinolone (KENALOG) 0.1 % cream Apply to lower legs and hands two times a day for 10 days only - not to face     urea 10 % lotion Apply 1 application topically daily as needed.     warfarin (COUMADIN/JANTOVEN) 5 MG tablet Take one 5mg tablet tonight and have labs rechecked and coumadin dosed based on INR       Allergies   Allergen Reactions     Latex      Penicillins Rash     Childhood rxn  -- tolerated cefazolin 10/4/18         Review of Systems   No fevers or chills. No headache, lightheadedness or dizziness. No SOB, chest pains or palpitations. Appetite is good. No nausea, vomiting, constipation or diarrhea. No dysuria, frequency, burning or pain with urination.  Patient reports fullness in his head feels like he is listening to sound out of the drum.  Otherwise review of systems are negative.         Physical Exam   PHYSICAL EXAMINATION:  Vital signs: /73, heart rate 73, respirations 16, temperature 97.9, O2 sat 98% on room air.  Current weight 184.8 pounds  General: Awake, Alert, oriented x3, appropriately, follows simple commands, conversant  HEENT:PERRLA, Pink conjunctiva, anicteric sclerae, moist oral mucosa.  Moderate cerumen in both ears.  NECK: Supple, without any lymphadenopathy, or masses  CVS:  S1  S2, without murmur or gallop.  Incision to chest intact with glue.  To drain site wound with Band-Aids.  Appears clear infection.    LUNG: No wheezes, rales or rhonchi. No cough on exam.   BACK: No kyphosis of the thoracic spine  ABDOMEN: Soft, nontender to palpation, with positive bowel sounds  EXTREMITIES: Good range of  motion on both upper and lower extremities,Trace pedal edema, no calf tenderness  SKIN: Pinpoint open areas to lower extremities.   NEUROLOGIC: Intact, pulses palpable  PSYCHIATRIC: Mild cognitive impairment.             Labs:   Recent Results (from the past 240 hour(s))   INR   Result Value Ref Range    INR 2.10 (!) 0.9 - 1.1   INR   Result Value Ref Range    INR 2.60 (!) 0.9 - 1.1         Assessment/Plan:  1. MI, acute, non ST segment elevation (H)     2. S/P CABG x 1     3. Heart failure with preserved ejection fraction (H)     4. Dysphasia     5. Community acquired pneumonia, unspecified laterality     6. Atrial fibrillation (H)     7. PAD (peripheral artery disease) (H)     8. Type 2 diabetes mellitus with diabetic peripheral angiopathy and gangrene, without long-term current use of insulin (H)         Patient recently hospitalized with non-ST MI who underwent CABG x3 on 10/4/18.  He did have some dysphagia post surgery.  Continues with speech therapy.  He has been noncompliant with his swallowing precautions.  He is a follow-up video swallow today.  He was treated for pneumonia during his TCU stay.  He is completed his oral antibiotics.  Recent fluid overload.  He does have underlying heart failure.  Decreased lower extremity edema.  Is on Lasix.  Weights are stable.Atrial fib on chronic anticoagulation.  Rate controlled.  Lower extremity wounds healing.  Continues with a small open area to the drain site.  He continues on topical treatment.  He does have a follow-up with ENT of his ears cleaned out.    Electronically signed by: Ashley Argueta CNP

## 2021-06-21 NOTE — PROGRESS NOTES
Code Status:  FULL CODE  Visit Type: Problem Visit     Facility:  [facility] [[id number]]         Facility Type: SNF (Skilled Nursing Facility, TCU)    History of Present Illness: Ever Crane is a 73 y.o. male who I am seeing today for follow-up on the TCU. Pt admitted to [facility] on 9/29/2018 following ED presentation for chest pain and subsequent diagnosis of NSTEMI. He has history of previous CABG, atrial fibrillation, CKD, HLD, HTN, peripheral arterial disease, and DMII. Coronary angiogram demonstrated non-patent grafts and severe coronary artery disease. He was referred to CV surgery for evaluation for possible coronary artery revascularization. Pt underwent 3 vessel CABG on 10/04/2018  and endoscopic vein harvest from the lft leg.  Preoperative patient had atrial field he continues on Coumadin and beta-blocker.  No further episodes.  Dysphagia.  Patient seen by speech therapy and requires nectar thick liquids.  Patient also to found high-grade stenosis of the right internal carotid artery.  He should have a follow-up with vascular in 3 months.    Today patient sitting up on bed.  Occasional cough.  He did undergo video swallow.  He was downgraded to thin liquids.  Continues to complain of fullness in his ears.  He did have moderate cerumen.  ENT has been ordered.  Recently seen by cardiology.  His metoprolol was increased.  I am noting that his weight is starting to creep up.  He is now up about 8 pounds.  He denies any shortness of breath or chest pain.  He continues on Lasix.      Active Ambulatory Problems     Diagnosis Date Noted     Dyslipidemia, goal LDL below 70      Persistent atrial fibrillation (H)      Typical atrial flutter (H)      Venous hypertension, chronic, bilateral 08/10/2017     Polyneuropathy associated with underlying disease (H) 08/10/2017     Non-insulin dependent type 2 diabetes mellitus (H)      Acquired lymphedema of lower extremity 10/16/2017     Coronary  artery disease due to lipid rich plaque      PAD (peripheral artery disease) (H) 11/14/2017     Heart failure with preserved ejection fraction, NYHA class II (H) 01/25/2018     Benign essential hypertension      Wound, open, foot, left, sequela 06/20/2018     Medically noncompliant 06/20/2018     Cellulitis 08/11/2018     Acute conjunctivitis of left eye, unspecified acute conjunctivitis type      Allergic conjunctivitis, left      Bilateral lower leg cellulitis 08/31/2018     Non-STEMI (non-ST elevated myocardial infarction) (H)      Abnormal CT scan, chest      Carotid stenosis, asymptomatic, right      S/P CABG x 3      Resolved Ambulatory Problems     Diagnosis Date Noted     Essential hypertension      Leg ulcer, left (H) 12/30/2014     Leg ulcer, left, limited to breakdown of skin (H) 08/10/2017     Diabetic ulcer of both feet (H) 10/31/2017     Ischemic cardiomyopathy      Hyperkalemia 11/14/2017     Gangrene of left foot (H)      S/P transmetatarsal amputation of foot, left (H) 11/27/2017     Ulcer of foot, limited to breakdown of skin, unspecified laterality (H) 01/08/2018     Chest pain 09/29/2018     Past Medical History:   Diagnosis Date     Atrial flutter (H)      Candidiasis of perineum 1/3/2018     Coronary artery disease due to lipid rich plaque 2000     Diabetic ulcer of both feet (H) 10/31/2017     Dyslexia      Dyslipidemia, goal LDL below 70 2000     Essential hypertension      Gangrene of left foot (H)      Neuropathy (H)      Paroxysmal Atrial Fibrillation      Type 2 diabetes mellitus, without long-term current use of insulin (H)      Unable to function independently 11/13/2017       Current Outpatient Prescriptions   Medication Sig     acetaminophen (TYLENOL) 325 MG tablet Take 2 tablets (650 mg total) by mouth every 6 (six) hours as needed for pain.     albuterol (PROVENTIL) 2.5 mg /3 mL (0.083 %) nebulizer solution Take 3 mL (2.5 mg total) by nebulization every 6 (six) hours as needed for  wheezing or shortness of breath.     aspirin 81 mg chewable tablet Chew 1 tablet (81 mg total) daily.     cetirizine (ZYRTEC) 10 MG tablet Take 10 mg by mouth daily.     furosemide (LASIX) 40 MG tablet Take 40 mg by mouth daily.     gentamicin (GARAMYCIN) 0.1 % ointment Apply 1 application topically daily as needed.     glipiZIDE (GLUCOTROL) 10 MG tablet Take 10 mg by mouth daily before breakfast.     glipiZIDE (GLUCOTROL) 10 MG tablet Take 15 mg by mouth every evening.     Lactobacillus acidophilus 100 mg (1 billion cell) cap Take 1 capsule by mouth 2 (two) times a day. (Patient taking differently: Take 2 capsules by mouth 2 (two) times a day. )     loratadine (CLARITIN) 10 mg tablet Take 10 mg by mouth daily.     metoprolol tartrate (LOPRESSOR) 25 MG tablet Take 1 tablet (25 mg total) by mouth 2 (two) times a day.     nitroglycerin (NITROSTAT) 0.4 MG SL tablet Place 0.4 mg under the tongue every 5 (five) minutes as needed for chest pain.      NOVOLOG U-100 INSULIN ASPART 100 unit/mL injection Check blood sugar four (4) times daily.  11.9 Type 2 without complications     omeprazole (PRILOSEC) 20 MG capsule Take 20 mg by mouth daily.      simvastatin (ZOCOR) 40 MG tablet Take 40 mg by mouth at bedtime.      spironolactone (ALDACTONE) 25 MG tablet Take 25 mg by mouth daily.     urea 10 % lotion Apply 1 application topically daily as needed.     warfarin (COUMADIN/JANTOVEN) 5 MG tablet Take one 5mg tablet tonight and have labs rechecked and coumadin dosed based on INR       Allergies   Allergen Reactions     Latex      Penicillins Rash     Childhood rxn  -- tolerated cefazolin 10/4/18         Review of Systems   No fevers or chills. No headache, lightheadedness or dizziness. No SOB, chest pains or palpitations. Appetite is good. No nausea, vomiting, constipation or diarrhea. No dysuria, frequency, burning or pain with urination.  Patient reports fullness in his head feels like he is listening to sound out of a drum.   Concerned about driving.  Otherwise review of systems are negative.         Physical Exam   PHYSICAL EXAMINATION:  Vital signs: /77, heart rate 63, respiration 18, O2 sat 94% on room air.  Temperature 98.2.  Current weight 190.4 pounds  General: Awake, Alert, oriented x3, appropriately, follows simple commands, conversant  HEENT:PERRLA, Pink conjunctiva, anicteric sclerae, moist oral mucosa.  Moderate cerumen in both ears.  NECK: Supple, without any lymphadenopathy, or masses  CVS:  S1  S2, without murmur or gallop.  Incision to chest intact with glue. Drain site wound with Band-Aids.  LUNG: No wheezes, rales or rhonchi. No cough on exam. Wet voice sounds at times.   BACK: No kyphosis of the thoracic spine  ABDOMEN: Soft, nontender to palpation, with positive bowel sounds  EXTREMITIES: Good range of motion on both upper and lower extremities,Trace pedal edema, no calf tenderness  SKIN: Pinpoint open areas to lower extremities.   NEUROLOGIC: Intact, pulses palpable  PSYCHIATRIC: Mild cognitive impairment.             Labs:   Recent Results (from the past 240 hour(s))   INR   Result Value Ref Range    INR 2.10 (!) 0.9 - 1.1   INR   Result Value Ref Range    INR 2.60 (!) 0.9 - 1.1   INR   Result Value Ref Range    INR 2.40 (!) 0.9 - 1.1   Basic Metabolic Panel   Result Value Ref Range    Sodium 138 136 - 145 mmol/L    Potassium 4.5 3.5 - 5.0 mmol/L    Chloride 105 98 - 107 mmol/L    CO2 23 22 - 31 mmol/L    Anion Gap, Calculation 10 5 - 18 mmol/L    Glucose 123 70 - 125 mg/dL    Calcium 9.4 8.5 - 10.5 mg/dL    BUN 22 8 - 28 mg/dL    Creatinine 1.22 0.70 - 1.30 mg/dL    GFR MDRD Af Amer >60 >60 mL/min/1.73m2    GFR MDRD Non Af Amer 58 (L) >60 mL/min/1.73m2   BNP(B-type Natriuretic Peptide)   Result Value Ref Range     (H) 0 - 72 pg/mL         Assessment/Plan:  1. MI, acute, non ST segment elevation (H)     2. S/P CABG x 1     3. Heart failure with preserved ejection fraction (H)     4. Atrial fibrillation  (H)     5. Dysphasia     6. PAD (peripheral artery disease) (H)       Patient recently hospitalized with non-ST MI who underwent CABG x3 on 10/4/18.  He had a follow-up with cardiology yesterday.  Per her note she was increase metoprolol to 25 twice daily.  However note he is only on this once a day.  Will increase.  Regular rate and rhythm.  It was decided per cardiology that they would control rate versus rhythm.  Heart failure with preserved ejection fraction.  His weight is trending upward.  He is up about 8 pounds.  Denies any shortness of breath.  He does have occasional cough.  We will follow-up with laboratory including a BNP.  He does continue on Lasix 40 daily.  Atrial fib.  Rate controlled.  Dysphagia.  He recently had a follow-up video swallow.  He does have some penetration.  However was downgraded to thin liquids.  He is on swallow precautions and continues to work with speech therapy.  He is adamant about driving again.  I did tell him to have to be cleared by his cardiac surgeon as well as have a follow-up driving eval.  Fullness in his hears from cerumen buildup.  He has been receiving Debrox drops.  I ordered a ENT once he discharges.        Electronically signed by: Ashley Argueta, CNP

## 2021-06-21 NOTE — PROGRESS NOTES
Code Status:  FULL CODE  Visit Type: Problem Visit     Facility:  [facility] [[id number]]         Facility Type: SNF (Skilled Nursing Facility, TCU)    History of Present Illness: Ever Crane is a 73 y.o. male who I am seeing today for follow-up on the TCU. Pt admitted to [facility] on 9/29/2018 following ED presentation for chest pain and subsequent diagnosis of NSTEMI. He has history of previous CABG, atrial fibrillation, CKD, HLD, HTN, peripheral arterial disease, and DMII. Coronary angiogram demonstrated non-patent grafts and severe coronary artery disease. He was referred to CV surgery for evaluation for possible coronary artery revascularization. Pt underwent 3 vessel CABG on 10/04/2018  and endoscopic vein harvest from the lft leg.  Preoperative patient had atrial field he continues on Coumadin and beta-blocker.  No further episodes.  Dysphagia.  Patient seen by speech therapy and requires nectar thick liquids.  Patient also to found high-grade stenosis of the right internal carotid artery.  He should have a follow-up with vascular in 3 months.    Today patient sitting up on bed. He reports his cough is improving. He continues with occasional shortness of breath with exertion. He is eating well. He denies any CP. He was seen by [facility] vascular yesterday and underwent debridement of his wounds. He has hx of PVD with amputation of toes on the LLE. Today wounds to LLE with minimal serous drainage. No signs or symptoms of infection. Very superficial. Heart rate today with dropped beat. He has hx of atrial fib/flutter. Look back of his heart rates show rates from the mid 30s to low 60s. He is asymptomatic.     Active Ambulatory Problems     Diagnosis Date Noted     Dyslipidemia, goal LDL below 70      Persistent atrial fibrillation (H)      Typical atrial flutter (H)      Venous hypertension, chronic, bilateral 08/10/2017     Polyneuropathy associated with underlying disease (H)  08/10/2017     Non-insulin dependent type 2 diabetes mellitus (H)      Acquired lymphedema of lower extremity 10/16/2017     Coronary artery disease due to lipid rich plaque      PAD (peripheral artery disease) (H) 11/14/2017     Heart failure with preserved ejection fraction, NYHA class II (H) 01/25/2018     MESHA (acute kidney injury) (H) 01/25/2018     Benign essential hypertension      Wound, open, foot, left, sequela 06/20/2018     Medically noncompliant 06/20/2018     CAP (community acquired pneumonia) 07/02/2018     Cellulitis 08/11/2018     Acute conjunctivitis of left eye, unspecified acute conjunctivitis type      Allergic conjunctivitis, left      C. difficile colitis      Bilateral lower leg cellulitis 08/31/2018     Non-STEMI (non-ST elevated myocardial infarction) (H)      Abnormal CT scan, chest      Carotid stenosis, asymptomatic, right      S/P CABG x 3      Resolved Ambulatory Problems     Diagnosis Date Noted     Essential hypertension      Leg ulcer, left (H) 12/30/2014     Leg ulcer, left, limited to breakdown of skin (H) 08/10/2017     Diabetic ulcer of both feet (H) 10/31/2017     Ischemic cardiomyopathy      Hyperkalemia 11/14/2017     Gangrene of left foot (H)      S/P transmetatarsal amputation of foot, left (H) 11/27/2017     Ulcer of foot, limited to breakdown of skin, unspecified laterality (H) 01/08/2018     Chest pain 09/29/2018     Past Medical History:   Diagnosis Date     Atrial flutter (H)      Candidiasis of perineum 1/3/2018     Coronary artery disease due to lipid rich plaque 2000     Diabetic ulcer of both feet (H) 10/31/2017     Dyslexia      Dyslipidemia, goal LDL below 70 2000     Essential hypertension      Gangrene of left foot (H)      Neuropathy (H)      Paroxysmal Atrial Fibrillation      Type 2 diabetes mellitus, without long-term current use of insulin (H)      Unable to function independently 11/13/2017       Current Outpatient Prescriptions   Medication Sig      acetaminophen (TYLENOL) 325 MG tablet Take 2 tablets (650 mg total) by mouth every 6 (six) hours as needed for pain.     albuterol (PROVENTIL) 2.5 mg /3 mL (0.083 %) nebulizer solution Take 3 mL (2.5 mg total) by nebulization every 6 (six) hours as needed for wheezing or shortness of breath.     aluminum-magnesium hydroxide-simethicone (MAALOX ADVANCED) 200-200-20 mg/5 mL Susp Take 30 mL by mouth every 4 (four) hours as needed (gastrointestinal upset).     aspirin 81 mg chewable tablet Chew 1 tablet (81 mg total) daily.     bisacodyl (DULCOLAX) 5 mg EC tablet Take 2 tablets (10 mg total) by mouth daily as needed for constipation.     cetirizine (ZYRTEC) 10 MG tablet Take 10 mg by mouth daily.     furosemide (LASIX) 20 MG tablet Take 20 mg by mouth daily as needed. If weight increases 2 lbs in 24 hours.     furosemide (LASIX) 40 MG tablet Take 40 mg by mouth daily.     gentamicin (GARAMYCIN) 0.1 % ointment Apply 1 application topically daily as needed.     glipiZIDE (GLUCOTROL) 10 MG tablet Take 10 mg by mouth daily before breakfast.     glipiZIDE (GLUCOTROL) 10 MG tablet Take 15 mg by mouth every evening.     hydrocortisone 1 % cream Apply 1 application topically 2 (two) times a day as needed.     Lactobacillus acidophilus 100 mg (1 billion cell) cap Take 1 capsule by mouth 2 (two) times a day. (Patient taking differently: Take 2 capsules by mouth 2 (two) times a day. )     loratadine (CLARITIN) 10 mg tablet Take 10 mg by mouth daily.     metoprolol tartrate (LOPRESSOR) 25 MG tablet Take 1 tablet (25 mg total) by mouth 2 (two) times a day. (Patient taking differently: Take 25 mg by mouth 2 (two) times a day. 12.5mg in am and 25mg in pm.)     miconazole (MICOTIN) 2 % powder Apply 1 application topically daily as needed for itching.     miconazole (SECURA EXTRA THICK) 2 % cream Apply 1 application topically 2 (two) times a day as needed.     miconazole nitrate-zinc ox-pet (VUSION) 0.25-15-81.35 % Oint Apply 1  application topically 2 (two) times a day as needed.     min oil-petrolat (AQUAPHOR) ointment Apply 1 application topically 3 (three) times a day as needed.     nitroglycerin (NITROSTAT) 0.4 MG SL tablet Place 0.4 mg under the tongue every 5 (five) minutes as needed for chest pain.      NOVOLOG U-100 INSULIN ASPART 100 unit/mL injection Check blood sugar four (4) times daily.  11.9 Type 2 without complications     omeprazole (PRILOSEC) 20 MG capsule Take 20 mg by mouth daily.      simvastatin (ZOCOR) 40 MG tablet Take 40 mg by mouth at bedtime.      spironolactone (ALDACTONE) 25 MG tablet Take 25 mg by mouth daily.     triamcinolone (KENALOG) 0.1 % cream Apply to lower legs and hands two times a day for 10 days only - not to face     urea 10 % lotion Apply 1 application topically daily as needed.     warfarin (COUMADIN/JANTOVEN) 5 MG tablet Take one 5mg tablet tonight and have labs rechecked and coumadin dosed based on INR       Allergies   Allergen Reactions     Latex      Penicillins Rash     Childhood rxn  -- tolerated cefazolin 10/4/18         Review of Systems   No fevers or chills. No headache, lightheadedness or dizziness. No SOB, chest pains or palpitations. Appetite is good. No nausea, vomiting, constipation or diarrhea. No dysuria, frequency, burning or pain with urination. Otherwise review of systems are negative.         Physical Exam   PHYSICAL EXAMINATION:  Vital signs: /66, HR 60, R 20, T 98.2, O2 sat 99%.  Current weight 182.6pounds  General: Awake, Alert, oriented x3, appropriately, follows simple commands, conversant  HEENT:PERRLA, Pink conjunctiva, anicteric sclerae, moist oral mucosa  NECK: Supple, without any lymphadenopathy, or masses  CVS:  S1  S2, without murmur or gallop.  Incision to chest intact with glue.  3 Band-Aids over the drain sites.  No signs or symptoms of infection.  LUNG: No wheezes, rales or rhonchi. No cough on exam.   BACK: No kyphosis of the thoracic spine  ABDOMEN:  Soft, nontender to palpation, with positive bowel sounds  EXTREMITIES: Good range of motion on both upper and lower extremities,Trace pedal edema, no calf tenderness  SKIN: Incision to left medial aspect denuded. No signs or symptoms of infection. Sct serous drainage to old dressing. Left shin ulcer with small open areas. No signs or symptoms of infection.   NEUROLOGIC: Intact, pulses palpable  PSYCHIATRIC: Mild cognitive impairment.             Labs:   Recent Results (from the past 240 hour(s))   INR   Result Value Ref Range    INR 2.90 (!) 0.9 - 1.1   INR   Result Value Ref Range    INR 3.90 (!) 0.9 - 1.1   Basic Metabolic Panel   Result Value Ref Range    Sodium 139 136 - 145 mmol/L    Potassium 4.8 3.5 - 5.0 mmol/L    Chloride 105 98 - 107 mmol/L    CO2 23 22 - 31 mmol/L    Anion Gap, Calculation 11 5 - 18 mmol/L    Glucose 105 70 - 125 mg/dL    Calcium 10.0 8.5 - 10.5 mg/dL    BUN 33 (H) 8 - 28 mg/dL    Creatinine 1.38 (H) 0.70 - 1.30 mg/dL    GFR MDRD Af Amer >60 >60 mL/min/1.73m2    GFR MDRD Non Af Amer 51 (L) >60 mL/min/1.73m2   BNP(B-type Natriuretic Peptide)   Result Value Ref Range     (H) 0 - 72 pg/mL   HM1 (CBC with Diff)   Result Value Ref Range    WBC 8.9 4.0 - 11.0 thou/uL    RBC 3.86 (L) 4.40 - 6.20 mill/uL    Hemoglobin 10.4 (L) 14.0 - 18.0 g/dL    Hematocrit 33.5 (L) 40.0 - 54.0 %    MCV 87 80 - 100 fL    MCH 26.9 (L) 27.0 - 34.0 pg    MCHC 31.0 (L) 32.0 - 36.0 g/dL    RDW 14.5 11.0 - 14.5 %    Platelets 329 140 - 440 thou/uL    MPV 11.2 8.5 - 12.5 fL    Neutrophils % 70 50 - 70 %    Lymphocytes % 14 (L) 20 - 40 %    Monocytes % 10 2 - 10 %    Eosinophils % 6 0 - 6 %    Basophils % 1 0 - 2 %    Neutrophils Absolute 6.1 2.0 - 7.7 thou/uL    Lymphocytes Absolute 1.2 0.8 - 4.4 thou/uL    Monocytes Absolute 0.9 0.0 - 0.9 thou/uL    Eosinophils Absolute 0.5 (H) 0.0 - 0.4 thou/uL    Basophils Absolute 0.1 0.0 - 0.2 thou/uL   INR   Result Value Ref Range    INR 2.40 (!) 0.9 - 1.1   Basic Metabolic  Panel   Result Value Ref Range    Sodium 139 136 - 145 mmol/L    Potassium 4.6 3.5 - 5.0 mmol/L    Chloride 105 98 - 107 mmol/L    CO2 24 22 - 31 mmol/L    Anion Gap, Calculation 10 5 - 18 mmol/L    Glucose 93 70 - 125 mg/dL    Calcium 9.8 8.5 - 10.5 mg/dL    BUN 33 (H) 8 - 28 mg/dL    Creatinine 1.34 (H) 0.70 - 1.30 mg/dL    GFR MDRD Af Amer >60 >60 mL/min/1.73m2    GFR MDRD Non Af Amer 52 (L) >60 mL/min/1.73m2   BNP(B-type Natriuretic Peptide)   Result Value Ref Range     (H) 0 - 72 pg/mL         Assessment/Plan:  1. MI, acute, non ST segment elevation (H)     2. S/P CABG x 1     3. Dysphasia     4. Community acquired pneumonia, unspecified laterality     5. Heart failure with preserved ejection fraction (H)     6. Atrial fibrillation (H)     7. Type 2 diabetes mellitus with diabetic peripheral angiopathy and gangrene, without long-term current use of insulin (H)     8. Atrial flutter, unspecified type (H)     9. S/P transmetatarsal amputation of foot, left (H)     10. PAD (peripheral artery disease) (H)       Patient recently hospitalized with non-ST MI who underwent CABG x3 on 10/4/18.  He did have some dysphagia post surgery.  Continues with speech therapy.  Recently treated for pneumonia as well as fluid overload. He has completed his antibiotics. Today cough resolved. Lungs CTA. He is down about 8 lbs. His edema has greatly improved. I will decrease his lasix to 40mg daily. Creatine 1.34. I will repeat BMP on Thursday. DM. Occasional 200s. He continues on glipizide. I will d/c SS insulin.  Acute blood loss anemia. Hemoglobin 9.6. Today heart rate with dropped beat. Hx of a fib/flutter. EKG obtained today showed extra systole but sinus rhythm. He continues on Metoprolol. Review of heart rates show they vary from mid 30s to 60s. I will decrease his Metoprolol. Monitor heart rate and bp q shift.       Electronically signed by: Ashley Argueta, CNP

## 2021-06-21 NOTE — PROGRESS NOTES
Date of Service:10/22/2018    Chief Complaint:   Chief Complaint   Patient presents with     Leg Swelling       History:    Ever presents to clinic for evaluation of his foot ulcers.  He has a history that is significant for CAD, diabetes, and venous insufficiency. Status post left transmetatarsal amputation was performed on 11/5/17 by Dr. DESTINY Wick.   On 11/9/2017 he was discharged to a TCU where he currently resides. He is able to do full weight bearing on his heel, per Dr. DESTINY Mchugh on January 5.     The patient was last seen by me on 7/12//2018 when his ulcer was healed and Tubigrip started for compression.  Since his last visit, he underwent CABG and is a TCU at this time.  He likely will be transferred to a LTC facility.  He is not sure how he developed an ulcer on his left lateral leg.  Additionally, the superior incisional vein harvest site on his left medial leg is open.  No bandages are being applied to these areas.  He denies pain in the ulcers and is not currently wearing compression.    Current Outpatient Prescriptions   Medication Sig Dispense Refill     acetaminophen (TYLENOL) 325 MG tablet Take 2 tablets (650 mg total) by mouth every 6 (six) hours as needed for pain.  0     albuterol (PROVENTIL) 2.5 mg /3 mL (0.083 %) nebulizer solution Take 3 mL (2.5 mg total) by nebulization every 6 (six) hours as needed for wheezing or shortness of breath.  0     aluminum-magnesium hydroxide-simethicone (MAALOX ADVANCED) 200-200-20 mg/5 mL Susp Take 30 mL by mouth every 4 (four) hours as needed (gastrointestinal upset).  0     aspirin 81 mg chewable tablet Chew 1 tablet (81 mg total) daily.  0     bisacodyl (DULCOLAX) 5 mg EC tablet Take 2 tablets (10 mg total) by mouth daily as needed for constipation.  0     cetirizine (ZYRTEC) 10 MG tablet Take 10 mg by mouth daily.       furosemide (LASIX) 20 MG tablet Take 20 mg by mouth daily as needed. If weight increases 2 lbs in 24 hours.       furosemide (LASIX) 40 MG  tablet Take 40 mg by mouth daily.       gentamicin (GARAMYCIN) 0.1 % ointment Apply 1 application topically daily as needed.       glipiZIDE (GLUCOTROL) 10 MG tablet Take 10 mg by mouth daily before breakfast.       glipiZIDE (GLUCOTROL) 10 MG tablet Take 15 mg by mouth every evening.       hydrocortisone 1 % cream Apply 1 application topically 2 (two) times a day as needed.       Lactobacillus acidophilus 100 mg (1 billion cell) cap Take 1 capsule by mouth 2 (two) times a day. (Patient taking differently: Take 2 capsules by mouth 2 (two) times a day. ) 60 capsule 0     loratadine (CLARITIN) 10 mg tablet Take 10 mg by mouth daily.       metoprolol tartrate (LOPRESSOR) 25 MG tablet Take 1 tablet (25 mg total) by mouth 2 (two) times a day. 60 tablet 2     miconazole (MICOTIN) 2 % powder Apply 1 application topically daily as needed for itching.       miconazole (SECURA EXTRA THICK) 2 % cream Apply 1 application topically 2 (two) times a day as needed.       miconazole nitrate-zinc ox-pet (VUSION) 0.25-15-81.35 % Oint Apply 1 application topically 2 (two) times a day as needed.       min oil-petrolat (AQUAPHOR) ointment Apply 1 application topically 3 (three) times a day as needed. 396 g 11     nitroglycerin (NITROSTAT) 0.4 MG SL tablet Place 0.4 mg under the tongue every 5 (five) minutes as needed for chest pain.        NOVOLOG U-100 INSULIN ASPART 100 unit/mL injection Check blood sugar four (4) times daily.  11.9 Type 2 without complications 10 mL PRN     omeprazole (PRILOSEC) 20 MG capsule Take 20 mg by mouth daily.        simvastatin (ZOCOR) 40 MG tablet Take 40 mg by mouth at bedtime.        spironolactone (ALDACTONE) 25 MG tablet Take 25 mg by mouth daily.       triamcinolone (KENALOG) 0.1 % cream Apply to lower legs and hands two times a day for 10 days only - not to face 30 g 0     urea 10 % lotion Apply 1 application topically daily as needed.       warfarin (COUMADIN/JANTOVEN) 5 MG tablet Take one 5mg tablet  tonight and have labs rechecked and coumadin dosed based on INR 5 tablet 0     Current Facility-Administered Medications   Medication Dose Route Frequency Provider Last Rate Last Dose     lidocaine 2 % jelly (XYLOCAINE)   Topical PRN Miriam Johnson PANDAGardenia, CNP   1 application at 10/22/18 1349       Allergies   Allergen Reactions     Latex      Penicillins Rash     Childhood rxn  -- tolerated cefazolin 10/4/18       Physical Exam:    Vitals:    10/22/18 1335   BP: 116/60   Pulse: 76   Resp: 12   Temp: 98.4  F (36.9  C)    Body mass index is 25.04 kg/(m^2).    General:  73 y.o. male in no apparent distress.    Psychiatric:  Alert and oriented x 3.  Cooperative.   Integumentary:  Skin is uniformly warm and dry.    Lower extremity edema: minimal    Left lateral leg Ulceration(s):     Wound bed: %100 scab          Undermining no, Tunneling no   Wound Edge: attached   Periwound:  There is no erika wound erythema, induration, maceration, denudement fluctuance or warmth surrounding the ulcer(s).  Exudate: none            Quantity: minimal  Odor:  absent   Wound Shape irregular          Left medial incisional  Ulceration(s):     Wound bed: %100 adherent slough          Undermining no, Tunneling no   Wound Edge: attached   Periwound:  There is no erika wound erythema, but no induration, maceration, denudement fluctuance or warmth surrounding the ulcer(s).  Exudate: serosanguaneous            Quantity: minimal  Odor:  absent   Wound Shape regular    Labs:    Lab Results   Component Value Date    SEDRATE 21 (H) 08/31/2018     Lab Results   Component Value Date    CRP 4.5 (H) 09/01/2018     No components found for: CREATINE  Lab Results   Component Value Date    BUN 33 (H) 10/22/2018     Lab Results   Component Value Date    HGBA1C 7.2 (H) 08/12/2018         Vasc Edema 4/2/2018 5/14/2018 6/21/2018 7/12/2018 10/22/2018   Right just above MTP 27.4 23.6 23 24.2 23.5   Right Ankle 24.4 27.6 24 24.5 24   Right Widest Calf 34 37.2 35.7  37.8 35.5   Right Thigh Up 10cm - 46.2 51.6 47 41.5   Left - just above MTP 24.2 28.2 27.3 27.8 26.5   Left Ankle 25.5 25.5 22.7 22.2 23.0   Left Widest Calf 35.3 35.5 34.2 33.3 32.5   Left Thigh Up 10cm - 44.9 49 44.7 40.8       Wound 06/21/18 LEFT FOOT ANTERIOR (Active)   Pre Size Length 1 6/21/2018 11:00 AM   Pre Size Width 1.5 6/21/2018 11:00 AM   Pre Size Depth 0 6/21/2018 11:00 AM   Pre Total Sq cm 1.5 6/21/2018 11:00 AM       VASC Wound 10/22/18 left medial lower leg (Active)   Pre Size Length 0.5 10/22/2018  2:00 PM   Pre Size Width 0.2 10/22/2018  2:00 PM   Pre Size Depth 0.1 10/22/2018  2:00 PM   Pre Total Sq cm 0.1 10/22/2018  2:00 PM       VASC Wound 10/22/18 left lateral leg vein harvest? (Active)   Pre Size Length 4 10/22/2018  2:00 PM   Pre Size Width 1.5 10/22/2018  2:00 PM   Pre Size Depth 0.1 10/22/2018  2:00 PM   Pre Total Sq cm 6 10/22/2018  2:00 PM   Prodcut Used Bactroban 10/22/2018  2:00 PM       Wound 01/25/18 Amputation Right;3rd digit (Active)       Wound 01/25/18 Non-pressure related ulcer Foot Left (Active)       Wound 08/11/18 Non-pressure related ulcer Leg Right;Anterior (front) (Active)       Incision 10/04/18 Chest (Active)       Incision 10/04/18 Leg Left (Active)       Assessment:  1. Ulcer of left lower extremity, limited to breakdown of skin (H)  Change dressing   2. Acquired lymphedema of lower extremity  Change dressing   3. PAD (peripheral artery disease) (H)  Change dressing   4. Heart failure with preserved ejection fraction, NYHA class II (H)  Change dressing   5. Venous hypertension, chronic, bilateral  Change dressing   6. Ulcer of left lower extremity, unspecified ulcer stage (H)  Change dressing       A new wound was identified today: left lateral and medial leg ulcer      Plan:  1.  Debridement of the left medial and lateral leg ulcer was recommended.  After consent was obtained and topical 2% Xylocaine was applied under clean conditions, and using a #15 blade,the  devitalized dermal tissue was debrided for a total square centimeters of 6.1. Following debridement, there was a decrease in the nonviable tissue. The patient tolerated the procedure without any difficulty.     2.  Left lateral leg ulceration, etiology unclear.  No signs of infection.    3.  Left incisional medial leg ulcer, only sign of infection is the erythema.  Will treat topically.    4.  Venous insufficiency,  Stable. He is not wearing any compression.    5.  Dermatitis,  resolved      6.  Edema, stable.    7.  Diabetes, on Metformin.  6.6 A1c on 11/1/2017.  He has not received his orthotic shoes.  His sister will call [facility].    8.  Treatment:  No dressing is needed.  He will wear Dermafit for compression.    9.  Patient will follow up with me in 3 weeks  for further evaluation and response to the treatment.      Miriam Quinones, APRN, CNP,  [facility]  [phone]

## 2021-06-21 NOTE — PROGRESS NOTES
Code Status:  FULL CODE  Visit Type: Problem Visit     Facility:  [facility] [[id number]]         Facility Type: SNF (Skilled Nursing Facility, TCU)    History of Present Illness: Ever Crane is a 73 y.o. male who I am seeing today for follow-up on the TCU. Pt admitted to [facility] on 9/29/2018 following ED presentation for chest pain and subsequent diagnosis of NSTEMI. He has history of previous CABG, atrial fibrillation, CKD, HLD, HTN, peripheral arterial disease, and DMII. Coronary angiogram demonstrated non-patent grafts and severe coronary artery disease. He was referred to CV surgery for evaluation for possible coronary artery revascularization. Pt underwent 3 vessel CABG on 10/04/2018  and endoscopic vein harvest from the lft leg.  Preoperative patient had atrial field he continues on Coumadin and beta-blocker.  No further episodes.  Dysphagia.  Patient seen by speech therapy and requires nectar thick liquids.  Patient also to found high-grade stenosis of the right internal carotid artery.  He should have a follow-up with vascular in 3 months.    Today patient sitting up on bed. He continues with nagging cough. Lungs are CTA. He is a-febrile. Weight is down. Edema resolved. He has 2 pinpoint areas on his LE. He is receiving topical treatment. Recent EKG showed sinus rhythm with ventricular extrasytole. Today regular rate and rhythm. Heart rate in the 60s.     Active Ambulatory Problems     Diagnosis Date Noted     Dyslipidemia, goal LDL below 70      Persistent atrial fibrillation (H)      Typical atrial flutter (H)      Venous hypertension, chronic, bilateral 08/10/2017     Polyneuropathy associated with underlying disease (H) 08/10/2017     Non-insulin dependent type 2 diabetes mellitus (H)      Acquired lymphedema of lower extremity 10/16/2017     Coronary artery disease due to lipid rich plaque      PAD (peripheral artery disease) (H) 11/14/2017     Heart failure with preserved  ejection fraction, NYHA class II (H) 01/25/2018     MESHA (acute kidney injury) (H) 01/25/2018     Benign essential hypertension      Wound, open, foot, left, sequela 06/20/2018     Medically noncompliant 06/20/2018     CAP (community acquired pneumonia) 07/02/2018     Cellulitis 08/11/2018     Acute conjunctivitis of left eye, unspecified acute conjunctivitis type      Allergic conjunctivitis, left      C. difficile colitis      Bilateral lower leg cellulitis 08/31/2018     Non-STEMI (non-ST elevated myocardial infarction) (H)      Abnormal CT scan, chest      Carotid stenosis, asymptomatic, right      S/P CABG x 3      Resolved Ambulatory Problems     Diagnosis Date Noted     Essential hypertension      Leg ulcer, left (H) 12/30/2014     Leg ulcer, left, limited to breakdown of skin (H) 08/10/2017     Diabetic ulcer of both feet (H) 10/31/2017     Ischemic cardiomyopathy      Hyperkalemia 11/14/2017     Gangrene of left foot (H)      S/P transmetatarsal amputation of foot, left (H) 11/27/2017     Ulcer of foot, limited to breakdown of skin, unspecified laterality (H) 01/08/2018     Chest pain 09/29/2018     Past Medical History:   Diagnosis Date     Atrial flutter (H)      Candidiasis of perineum 1/3/2018     Coronary artery disease due to lipid rich plaque 2000     Diabetic ulcer of both feet (H) 10/31/2017     Dyslexia      Dyslipidemia, goal LDL below 70 2000     Essential hypertension      Gangrene of left foot (H)      Neuropathy (H)      Paroxysmal Atrial Fibrillation      Type 2 diabetes mellitus, without long-term current use of insulin (H)      Unable to function independently 11/13/2017       Current Outpatient Prescriptions   Medication Sig     acetaminophen (TYLENOL) 325 MG tablet Take 2 tablets (650 mg total) by mouth every 6 (six) hours as needed for pain.     albuterol (PROVENTIL) 2.5 mg /3 mL (0.083 %) nebulizer solution Take 3 mL (2.5 mg total) by nebulization every 6 (six) hours as needed for  wheezing or shortness of breath.     aluminum-magnesium hydroxide-simethicone (MAALOX ADVANCED) 200-200-20 mg/5 mL Susp Take 30 mL by mouth every 4 (four) hours as needed (gastrointestinal upset).     aspirin 81 mg chewable tablet Chew 1 tablet (81 mg total) daily.     bisacodyl (DULCOLAX) 5 mg EC tablet Take 2 tablets (10 mg total) by mouth daily as needed for constipation.     cetirizine (ZYRTEC) 10 MG tablet Take 10 mg by mouth daily.     furosemide (LASIX) 20 MG tablet Take 20 mg by mouth daily as needed. If weight increases 2 lbs in 24 hours.     furosemide (LASIX) 40 MG tablet Take 40 mg by mouth daily.     gentamicin (GARAMYCIN) 0.1 % ointment Apply 1 application topically daily as needed.     glipiZIDE (GLUCOTROL) 10 MG tablet Take 10 mg by mouth daily before breakfast.     glipiZIDE (GLUCOTROL) 10 MG tablet Take 15 mg by mouth every evening.     hydrocortisone 1 % cream Apply 1 application topically 2 (two) times a day as needed.     Lactobacillus acidophilus 100 mg (1 billion cell) cap Take 1 capsule by mouth 2 (two) times a day. (Patient taking differently: Take 2 capsules by mouth 2 (two) times a day. )     loratadine (CLARITIN) 10 mg tablet Take 10 mg by mouth daily.     metoprolol tartrate (LOPRESSOR) 25 MG tablet Take 1 tablet (25 mg total) by mouth 2 (two) times a day. (Patient taking differently: Take 25 mg by mouth 2 (two) times a day. 12.5mg in am and 25mg in pm.)     miconazole (MICOTIN) 2 % powder Apply 1 application topically daily as needed for itching.     miconazole (SECURA EXTRA THICK) 2 % cream Apply 1 application topically 2 (two) times a day as needed.     miconazole nitrate-zinc ox-pet (VUSION) 0.25-15-81.35 % Oint Apply 1 application topically 2 (two) times a day as needed.     min oil-petrolat (AQUAPHOR) ointment Apply 1 application topically 3 (three) times a day as needed.     nitroglycerin (NITROSTAT) 0.4 MG SL tablet Place 0.4 mg under the tongue every 5 (five) minutes as needed  for chest pain.      NOVOLOG U-100 INSULIN ASPART 100 unit/mL injection Check blood sugar four (4) times daily.  11.9 Type 2 without complications     omeprazole (PRILOSEC) 20 MG capsule Take 20 mg by mouth daily.      simvastatin (ZOCOR) 40 MG tablet Take 40 mg by mouth at bedtime.      spironolactone (ALDACTONE) 25 MG tablet Take 25 mg by mouth daily.     triamcinolone (KENALOG) 0.1 % cream Apply to lower legs and hands two times a day for 10 days only - not to face     urea 10 % lotion Apply 1 application topically daily as needed.     warfarin (COUMADIN/JANTOVEN) 5 MG tablet Take one 5mg tablet tonight and have labs rechecked and coumadin dosed based on INR       Allergies   Allergen Reactions     Latex      Penicillins Rash     Childhood rxn  -- tolerated cefazolin 10/4/18         Review of Systems   No fevers or chills. No headache, lightheadedness or dizziness. No SOB, chest pains or palpitations. Appetite is good. No nausea, vomiting, constipation or diarrhea. No dysuria, frequency, burning or pain with urination. Otherwise review of systems are negative.         Physical Exam   PHYSICAL EXAMINATION:  Vital signs: /70 pulse 63, respirations 20, O2 sat percent on room air..  Current weight 182.6pounds  General: Awake, Alert, oriented x3, appropriately, follows simple commands, conversant  HEENT:PERRLA, Pink conjunctiva, anicteric sclerae, moist oral mucosa  NECK: Supple, without any lymphadenopathy, or masses  CVS:  S1  S2, without murmur or gallop.  Incision to chest intact with glue.  3 Band-Aids over the drain sites.  No signs or symptoms of infection.  LUNG: No wheezes, rales or rhonchi. No cough on exam.   BACK: No kyphosis of the thoracic spine  ABDOMEN: Soft, nontender to palpation, with positive bowel sounds  EXTREMITIES: Good range of motion on both upper and lower extremities,Trace pedal edema, no calf tenderness  SKIN: Pinpoint open areas to lower extremities.  Topical treatment.  NEUROLOGIC:  Intact, pulses palpable  PSYCHIATRIC: Mild cognitive impairment.             Labs:   Recent Results (from the past 240 hour(s))   INR   Result Value Ref Range    INR 2.40 (!) 0.9 - 1.1   Basic Metabolic Panel   Result Value Ref Range    Sodium 139 136 - 145 mmol/L    Potassium 4.6 3.5 - 5.0 mmol/L    Chloride 105 98 - 107 mmol/L    CO2 24 22 - 31 mmol/L    Anion Gap, Calculation 10 5 - 18 mmol/L    Glucose 93 70 - 125 mg/dL    Calcium 9.8 8.5 - 10.5 mg/dL    BUN 33 (H) 8 - 28 mg/dL    Creatinine 1.34 (H) 0.70 - 1.30 mg/dL    GFR MDRD Af Amer >60 >60 mL/min/1.73m2    GFR MDRD Non Af Amer 52 (L) >60 mL/min/1.73m2   BNP(B-type Natriuretic Peptide)   Result Value Ref Range     (H) 0 - 72 pg/mL   INR   Result Value Ref Range    INR 2.40 (!) 0.9 - 1.1   Basic Metabolic Panel   Result Value Ref Range    Sodium 137 136 - 145 mmol/L    Potassium 4.9 3.5 - 5.0 mmol/L    Chloride 102 98 - 107 mmol/L    CO2 26 22 - 31 mmol/L    Anion Gap, Calculation 9 5 - 18 mmol/L    Glucose 103 70 - 125 mg/dL    Calcium 10.1 8.5 - 10.5 mg/dL    BUN 31 (H) 8 - 28 mg/dL    Creatinine 1.35 (H) 0.70 - 1.30 mg/dL    GFR MDRD Af Amer >60 >60 mL/min/1.73m2    GFR MDRD Non Af Amer 52 (L) >60 mL/min/1.73m2         Assessment/Plan:  1. MI, acute, non ST segment elevation (H)     2. S/P CABG x 1     3. Heart failure with preserved ejection fraction (H)     4. Community acquired pneumonia, unspecified laterality     5. Atrial fibrillation (H)     6. PAD (peripheral artery disease) (H)       Patient recently hospitalized with non-ST MI who underwent CABG x3 on 10/4/18.  He did have some dysphagia post surgery.  Continues with speech therapy.  He has been treated for exacerbation of pneumonia as well as CHF.  Continues with somewhat of a nagging cough.  Lung sounds are clear.  Edema resolved.  Weight 8 pounds.  Decrease his Lasix.  Atrial fib.  Recent EKG showed normal sinus rhythm with extrasystole.  He is rate controlled.  Today heart rate in  60s.  PAD.  He has 2 open lesions to the lower extremities.  He is receiving calmoseptine and foam dressing.  Areas resolving.  Acute blood loss anemia. Hemoglobin 9.6.  Diabetes satisfactory control.      Electronically signed by: Ashley Argueta CNP

## 2021-06-21 NOTE — PROGRESS NOTES
Code Status:  FULL CODE  Visit Type: Discharge Summary     Facility:  [facility] [[id number]]         Facility Type: SNF (Skilled Nursing Facility, TCU)    History of Present Illness: Ever Crane is a 73 y.o. male who I am seeing today for discharge from the TCU. Pt admitted to [facility] on 9/29/2018 following ED presentation for chest pain and subsequent diagnosis of NSTEMI. He has history of previous CABG, atrial fibrillation, CKD, HLD, HTN, peripheral arterial disease, and DMII. Coronary angiogram demonstrated non-patent grafts and severe coronary artery disease. He was referred to CV surgery for evaluation for possible coronary artery revascularization. Pt underwent 3 vessel CABG on 10/04/2018  and endoscopic vein harvest from the lft leg.  Preoperative patient had atrial field he continues on Coumadin and beta-blocker.  No further episodes.  Dysphagia.  Patient seen by speech therapy and requires nectar thick liquids.  Patient also to found high-grade stenosis of the right internal carotid artery.  He should have a follow-up with vascular in 3 months.    Today patient sitting up on bed.  Occasional cough.  He did undergo video swallow.  He was downgraded to thin liquids.  Continues to complain of fullness in his ears.  He did have moderate cerumen.  ENT has been ordered.  Recently seen by cardiology.  His metoprolol was increased.  Wounds healed to lower extremities.  Chest incision dry and intact.  Some cognitive impairments.  Rash to his hands with previous eczema.  .    Active Ambulatory Problems     Diagnosis Date Noted     Dyslipidemia, goal LDL below 70      Persistent atrial fibrillation (H)      Typical atrial flutter (H)      Venous hypertension, chronic, bilateral 08/10/2017     Polyneuropathy associated with underlying disease (H) 08/10/2017     Non-insulin dependent type 2 diabetes mellitus (H)      Acquired lymphedema of lower extremity 10/16/2017     Coronary artery  disease due to lipid rich plaque      PAD (peripheral artery disease) (H) 11/14/2017     Heart failure with preserved ejection fraction, NYHA class II (H) 01/25/2018     Benign essential hypertension      Wound, open, foot, left, sequela 06/20/2018     Medically noncompliant 06/20/2018     Cellulitis 08/11/2018     Acute conjunctivitis of left eye, unspecified acute conjunctivitis type      Allergic conjunctivitis, left      Bilateral lower leg cellulitis 08/31/2018     Non-STEMI (non-ST elevated myocardial infarction) (H)      Abnormal CT scan, chest      Carotid stenosis, asymptomatic, right      S/P CABG x 3      Resolved Ambulatory Problems     Diagnosis Date Noted     Essential hypertension      Leg ulcer, left (H) 12/30/2014     Leg ulcer, left, limited to breakdown of skin (H) 08/10/2017     Diabetic ulcer of both feet (H) 10/31/2017     Ischemic cardiomyopathy      Hyperkalemia 11/14/2017     Gangrene of left foot (H)      S/P transmetatarsal amputation of foot, left (H) 11/27/2017     Ulcer of foot, limited to breakdown of skin, unspecified laterality (H) 01/08/2018     Chest pain 09/29/2018     Past Medical History:   Diagnosis Date     Atrial flutter (H)      Candidiasis of perineum 1/3/2018     Coronary artery disease due to lipid rich plaque 2000     Diabetic ulcer of both feet (H) 10/31/2017     Dyslexia      Dyslipidemia, goal LDL below 70 2000     Essential hypertension      Gangrene of left foot (H)      Neuropathy (H)      Paroxysmal Atrial Fibrillation      Type 2 diabetes mellitus, without long-term current use of insulin (H)      Unable to function independently 11/13/2017       Current Outpatient Medications   Medication Sig     acetaminophen (TYLENOL) 325 MG tablet Take 2 tablets (650 mg total) by mouth every 6 (six) hours as needed for pain.     albuterol (PROVENTIL) 2.5 mg /3 mL (0.083 %) nebulizer solution Take 3 mL (2.5 mg total) by nebulization every 6 (six) hours as needed for wheezing  or shortness of breath.     aspirin 81 mg chewable tablet Chew 1 tablet (81 mg total) daily.     cetirizine (ZYRTEC) 10 MG tablet Take 10 mg by mouth daily.     furosemide (LASIX) 40 MG tablet Take 40 mg by mouth daily.     gentamicin (GARAMYCIN) 0.1 % ointment Apply 1 application topically daily as needed.     glipiZIDE (GLUCOTROL) 10 MG tablet Take 10 mg by mouth daily before breakfast.     glipiZIDE (GLUCOTROL) 10 MG tablet Take 15 mg by mouth every evening.     Lactobacillus acidophilus 100 mg (1 billion cell) cap Take 1 capsule by mouth 2 (two) times a day. (Patient taking differently: Take 2 capsules by mouth 2 (two) times a day. )     loratadine (CLARITIN) 10 mg tablet Take 10 mg by mouth daily.     metoprolol tartrate (LOPRESSOR) 25 MG tablet Take 1 tablet (25 mg total) by mouth 2 (two) times a day.     nitroglycerin (NITROSTAT) 0.4 MG SL tablet Place 0.4 mg under the tongue every 5 (five) minutes as needed for chest pain.      NOVOLOG U-100 INSULIN ASPART 100 unit/mL injection Check blood sugar four (4) times daily.  11.9 Type 2 without complications     omeprazole (PRILOSEC) 20 MG capsule Take 20 mg by mouth daily.      simvastatin (ZOCOR) 40 MG tablet Take 40 mg by mouth at bedtime.      spironolactone (ALDACTONE) 25 MG tablet Take 25 mg by mouth daily.     urea 10 % lotion Apply 1 application topically daily as needed.     warfarin (COUMADIN/JANTOVEN) 5 MG tablet Take one 5mg tablet tonight and have labs rechecked and coumadin dosed based on INR       Allergies   Allergen Reactions     Latex      Penicillins Rash     Childhood rxn  -- tolerated cefazolin 10/4/18         Review of Systems   No fevers or chills. No headache, lightheadedness or dizziness. No SOB, chest pains or palpitations. Appetite is good. No nausea, vomiting, constipation or diarrhea. No dysuria, frequency, burning or pain with urination.  Patient reports fullness in his head feels like he is listening to sound out of a drum.  Concerned  about driving.  Otherwise review of systems are negative.         Physical Exam   PHYSICAL EXAMINATION:  Vital signs: /69, heart rate 78, respirations 18, temperature 97.8,  O2 sat 97% current weight 190.4 pounds  General: Awake, Alert, oriented x3, appropriately, follows simple commands, conversant  HEENT:PERRLA, Pink conjunctiva, anicteric sclerae, moist oral mucosa.  Moderate cerumen in both ears.  NECK: Supple, without any lymphadenopathy, or masses  CVS:  S1  S2, without murmur or gallop.  Incision to chest intact with glue. Drain site wound with Band-Aids.  LUNG: No wheezes, rales or rhonchi. No cough on exam. Wet voice sounds at times.   BACK: No kyphosis of the thoracic spine  ABDOMEN: Soft, nontender to palpation, with positive bowel sounds  EXTREMITIES: Good range of motion on both upper and lower extremities,Trace pedal edema, no calf tenderness  SKIN: Pinpoint open areas to lower extremities.   NEUROLOGIC: Intact, pulses palpable  PSYCHIATRIC: Mild cognitive impairment.             Labs:   Recent Results (from the past 240 hour(s))   INR   Result Value Ref Range    INR 2.40 (!) 0.9 - 1.1   Basic Metabolic Panel   Result Value Ref Range    Sodium 138 136 - 145 mmol/L    Potassium 4.5 3.5 - 5.0 mmol/L    Chloride 105 98 - 107 mmol/L    CO2 23 22 - 31 mmol/L    Anion Gap, Calculation 10 5 - 18 mmol/L    Glucose 123 70 - 125 mg/dL    Calcium 9.4 8.5 - 10.5 mg/dL    BUN 22 8 - 28 mg/dL    Creatinine 1.22 0.70 - 1.30 mg/dL    GFR MDRD Af Amer >60 >60 mL/min/1.73m2    GFR MDRD Non Af Amer 58 (L) >60 mL/min/1.73m2   BNP(B-type Natriuretic Peptide)   Result Value Ref Range     (H) 0 - 72 pg/mL   Basic Metabolic Panel   Result Value Ref Range    Sodium 137 136 - 145 mmol/L    Potassium 4.0 3.5 - 5.0 mmol/L    Chloride 102 98 - 107 mmol/L    CO2 24 22 - 31 mmol/L    Anion Gap, Calculation 11 5 - 18 mmol/L    Glucose 269 (H) 70 - 125 mg/dL    Calcium 10.0 8.5 - 10.5 mg/dL    BUN 18 8 - 28 mg/dL     Creatinine 1.31 (H) 0.70 - 1.30 mg/dL    GFR MDRD Af Amer >60 >60 mL/min/1.73m2    GFR MDRD Non Af Amer 54 (L) >60 mL/min/1.73m2   BNP(B-type Natriuretic Peptide)   Result Value Ref Range     (H) 0 - 72 pg/mL         Assessment/Plan:  1. MI, acute, non ST segment elevation (H)     2. S/P CABG x 1     3. Heart failure with preserved ejection fraction (H)     4. Paroxysmal atrial fibrillation (H)     5. Type 2 diabetes mellitus with diabetic peripheral angiopathy and gangrene, without long-term current use of insulin (H)     6. Dysphasia     7. PAD (peripheral artery disease) (H)     8. Carotid stenosis, right         Patient recently hospitalized with non-ST MI who underwent CABG x3 on 10/4/18.  Recent follow-up with cardiology and metoprolol increased  25 twice daily.   Regular rate and rhythm.  It was decided per cardiology that they would control rate versus rhythm.  Heart failure with preserved ejection fraction.  His weight is trending upward.  He is up about 8 pounds.  BNP improved at 161.  Denies any shortness of breath.  He does have occasional cough. He does continue on Lasix 40 daily.  Atrial fib.  Rate controlled.  Dysphagia.  He recently had a follow-up video swallow.  He does have some penetration.  However was downgraded to thin liquids.  He is on swallow precautions and continues to work with speech therapy.  He is adamant about driving again.  I did tell him to have to be cleared by his cardiac surgeon as well as have a follow-up driving eval.  Fullness in his hears from cerumen buildup.  He has been receiving Debrox drops.  I ordered a ENT once he discharges.    Patient to discharge [facility] with current meds and treatments.  I will DC his nebs.  Home PT, OT, home health and RN.    DISCHARGE PLAN/FACE TO FACE:  I certify that this patient is under my care and that I, or a nurse practitioner or physician's assistant working with me, had a face-to-face encounter that meets the physician face-to-face  encounter requirements with this patient.       I certify that, based on my findings, the following services are medically necessary home health services.     My clinical findings support the need for the above skilled services.     This patient is homebound because: Recent MI with CABG.    The patient is, or has been, under my care and I have initiated the establishment of the plan of care. This patient will be followed by a physician who will periodically review the plan of care.          Electronically signed by: Ashley Argueta CNP

## 2021-06-22 NOTE — PROGRESS NOTES
Date of Service:12/3/2018    Chief Complaint:   Chief Complaint   Patient presents with     Leg Swelling       History:    The patient was last seen by me on 11/12//2018 when his ulcer was healed and TMC prescribed for his dermatitis.  He was also instructed to use his velcro compression wraps for compression.  Ever did not use his velcro compression wraps and started wearing his old Dermafit (Tubigrip) that he was instructed to throw away because he has latex and non latex TubiGrip at home.  He is allergic to latex and frequently breaks out when he wears the wrong Tubigrip.  He was into urgent care last week for concerns about an infection.  He was started on Bactrim and the ulcer cultured.  He was notified yesterday that he should stop the bactrim and start Doxycycline.  He did not  the doxycycline until today.  After reviewing the culture results with him, I advised him to continue with the Bactrim DS and start the Doxycycline because he is needing both antibiotics to treat his infection.      Current Outpatient Medications   Medication Sig Dispense Refill     acetaminophen (TYLENOL) 325 MG tablet Take 2 tablets (650 mg total) by mouth every 6 (six) hours as needed for pain.  0     albuterol (PROVENTIL) 2.5 mg /3 mL (0.083 %) nebulizer solution Take 3 mL (2.5 mg total) by nebulization every 6 (six) hours as needed for wheezing or shortness of breath.  0     aspirin 81 mg chewable tablet Chew 1 tablet (81 mg total) daily.  0     cetirizine (ZYRTEC) 10 MG tablet Take 10 mg by mouth daily.       dextromethorphan-guaifenesin (DIABETIC TUSSIN DM)  mg/5 mL Liqd Take 10 mL by mouth 4 (four) times a day as needed.       doxycycline (VIBRA-TABS) 100 MG tablet Take 1 tablet (100 mg total) by mouth 2 (two) times a day for 10 days. 20 tablet 0     furosemide (LASIX) 40 MG tablet Take 40 mg by mouth daily.       gentamicin (GARAMYCIN) 0.1 % ointment Apply 1 application topically daily as needed.       glipiZIDE  (GLUCOTROL) 10 MG tablet Take 10 mg by mouth daily before breakfast.       glipiZIDE (GLUCOTROL) 10 MG tablet Take 15 mg by mouth every evening.       Lactobacillus acidophilus 100 mg (1 billion cell) cap Take 1 capsule by mouth 2 (two) times a day. (Patient taking differently: Take 2 capsules by mouth 2 (two) times a day. ) 60 capsule 0     loratadine (CLARITIN) 10 mg tablet Take 10 mg by mouth daily.       metoprolol tartrate (LOPRESSOR) 25 MG tablet Take 1 tablet (25 mg total) by mouth 2 (two) times a day. 60 tablet 2     nitroglycerin (NITROSTAT) 0.4 MG SL tablet Place 0.4 mg under the tongue every 5 (five) minutes as needed for chest pain.        NOVOLOG U-100 INSULIN ASPART 100 unit/mL injection Check blood sugar four (4) times daily.  11.9 Type 2 without complications 10 mL PRN     omeprazole (PRILOSEC) 20 MG capsule Take 20 mg by mouth daily.        simvastatin (ZOCOR) 40 MG tablet Take 40 mg by mouth at bedtime.        spironolactone (ALDACTONE) 25 MG tablet Take 25 mg by mouth daily.       triamcinolone (KENALOG) 0.1 % cream Apply to your legs each leg at bedtime 7 days. 30 g 2     urea 10 % lotion Apply 1 application topically daily as needed.       warfarin (COUMADIN/JANTOVEN) 5 MG tablet Take one 5mg tablet tonight and have labs rechecked and coumadin dosed based on INR 5 tablet 0     Current Facility-Administered Medications   Medication Dose Route Frequency Provider Last Rate Last Dose     lidocaine 2 % jelly (XYLOCAINE)   Topical PRN Miriam Quinones, CNP   1 application at 12/03/18 1312       Allergies   Allergen Reactions     Latex      Penicillins Rash     Childhood rxn  -- tolerated cefazolin 10/4/18       Physical Exam:    Vitals:    12/03/18 1302   BP: 130/70   Pulse: (!) 44   Resp: 16   Temp: 98  F (36.7  C)    There is no height or weight on file to calculate BMI.    General:  73 y.o. male in no apparent distress.    Psychiatric:  Alert and oriented x 3.  Cooperative.   Integumentary:   Skin is uniformly warm and dry.    Lower extremity edema: minimal    Left lateral leg Ulceration(s): Resolved      Left medial incisional  Ulceration(s): Resolved    Labs:    Lab Results   Component Value Date    SEDRATE 21 (H) 08/31/2018     Lab Results   Component Value Date    CRP 4.5 (H) 09/01/2018     No components found for: CREATINE  Lab Results   Component Value Date    BUN 18 11/07/2018     Lab Results   Component Value Date    HGBA1C 6.8 (H) 11/19/2018         Vasc Edema 6/21/2018 7/12/2018 10/22/2018 11/12/2018 12/3/2018   Right just above MTP 23 24.2 23.5 24 23.5   Right Ankle 24 24.5 24 25 24.3   Right Widest Calf 35.7 37.8 35.5 39.1 36   Right Thigh Up 10cm 51.6 47 41.5 46.2 45.5    Left - just above MTP 27.3 27.8 26.5 27 26   Left Ankle 22.7 22.2 23.0 23 23.5   Left Widest Calf 34.2 33.3 32.5 35.1 34.8   Left Thigh Up 10cm 49 44.7 40.8 40.2 44.5       VASC Wound 10/22/18 left medial lower leg (Active)   Pre Size Length 0.5 10/22/2018  2:00 PM   Pre Size Width 0.2 10/22/2018  2:00 PM   Pre Size Depth 0.1 10/22/2018  2:00 PM   Pre Total Sq cm 0.1 10/22/2018  2:00 PM   Description weeping 12/3/2018  1:00 PM       VASC Wound 10/22/18 left medial leg vein harvest? (Active)   Pre Size Length 1 12/3/2018  1:00 PM   Pre Size Width 1 12/3/2018  1:00 PM   Pre Size Depth 0.1 12/3/2018  1:00 PM   Pre Total Sq cm 1 12/3/2018  1:00 PM   Prodcut Used Bactroban 10/22/2018  2:00 PM       VASC Wound 11/12/18 left lateral lower leg (Active)   Pre Size Length 2.2 12/3/2018  1:00 PM   Pre Size Width 2 12/3/2018  1:00 PM   Pre Size Depth 0.1 12/3/2018  1:00 PM   Pre Total Sq cm 4.4 12/3/2018  1:00 PM   Post Size Length 2.2 12/3/2018  1:00 PM   Post Size Width 2 12/3/2018  1:00 PM   Undermined n 11/12/2018  2:00 PM   Tunneling n 11/12/2018  2:00 PM   Description weeping 12/3/2018  1:00 PM       VASC Wound 12/03/18 right leg (Active)   Description weeping 12/3/2018  1:00 PM       Wound 01/25/18 Amputation Right;3rd digit  (Active)       Wound 01/25/18 Non-pressure related ulcer Foot Left (Active)       Wound 08/11/18 Non-pressure related ulcer Leg Right;Anterior (front) (Active)       Incision 10/04/18 Chest (Active)       Incision 10/04/18 Leg Left (Active)       Assessment:  1. Leg ulcer, left, limited to breakdown of skin (H)     2. Wound, open, foot, left, sequela  Change dressing   3. Venous stasis dermatitis of both lower extremities     4. Venous hypertension, chronic, bilateral     5. Ischemic cardiomyopathy     6. PAD (peripheral artery disease) (H)     7. Allergic contact dermatitis due to adhesives         A new wound was identified today: left lateral and medial leg ulcer      Plan:  1. Venous and contact dermatitis, worse he will apply the TMC daily x2 weeks.    2.  Venous insufficiency,  Stable. Encouraged him to wear his Velcro compression wraps daily.    3.  Left leg ulcer, signs of infection.  Per culture sensitivities, recommended that he take both the Bactrim and Doxycycline.    4.  Edema, stable.    5.  Bradycardia, I advised him to hold the metoprolol until he speaks with his cardiologist.  His cardiologist was also notified of his pulse rate.    6.  Diabetes, on Metformin.  6.6 A1c on 11/1/2017.      7.  Treatment: For the wounds, he will apply silvercel and cover with an ABD  It will be secured with a roll gauze.  He will wear Dermafit (Tubigrip) for compression and start the TMC for his dermatitis.     8.  Patient will follow up with me in 3 weeks  for further evaluation and response to the treatment.      Miriam Quinones, APRN, CNP,  [facility]  [phone]

## 2021-06-22 NOTE — TELEPHONE ENCOUNTER
----- Message from Nayla Acevedo CNP sent at 12/31/2018 12:33 PM CST -----  Can you call sister, Miriam and find out if need to send report to transitional care or not.  All normal rhythm and average HR 77.  No afib seen. ( Off metoprolol due to bradycardia.)

## 2021-06-22 NOTE — PROGRESS NOTES
I have not seen this patient and he has not been seen at [facility].  Please forward this message to PCP.  Diaz Aleman MD  12/3/2018

## 2021-06-22 NOTE — PROGRESS NOTES
Chief Complaint   Patient presents with     LEFT LEG WOUND possible infection     X 7 WEEKS AGO , SINCE cardiac surgery        HPI:  Ever Crane is a 73 y.o. male who presents today complaining of concern for left leg wound infection.  7 weeks ago the patient had an endoscopic saphenous vein graft for a triple coronary bipass surgery. At the site that the vein was harvested the patient has concern for infection.  The area surrounding the wound is tender and there has been purulent drainage for the past week.  Was previously well-healed.  A follow-up appointment with wound care in 4 days, but was concerned that she not be waiting this long to be evaluated.  He lives in a assisted living, and his sister helps him with some of his cares.  Nurses help with dressing changes 1-2 times daily. He has not had any fevers.           History obtained from the patient.    Problem List:  2018-09: Chest pain  2018-08: Bilateral lower leg cellulitis  2018-08: Cellulitis  2018-06: Wound, open, foot, left, sequela  2018-06: Medically noncompliant  2018-01: Heart failure with preserved ejection fraction, NYHA class   II (H)  2018-01: Ulcer of foot, limited to breakdown of skin, unspecified   laterality (H)  2017-11: S/P transmetatarsal amputation of foot, left (H)  2017-11: PAD (peripheral artery disease) (H)  2017-11: Hyperkalemia  2017-10: Diabetic ulcer of both feet (H)  2017-10: Acquired lymphedema of lower extremity  2017-08: Leg ulcer, left, limited to breakdown of skin (H)  2017-08: Venous hypertension, chronic, bilateral  2017-08: Polyneuropathy associated with underlying disease (H)  2014-12: Leg ulcer, left (H)  Dyslipidemia, goal LDL below 70  Essential hypertension  Persistent atrial fibrillation (H)  Typical atrial flutter (H)  Non-insulin dependent type 2 diabetes mellitus (H)  Coronary artery disease due to lipid rich plaque  Ischemic cardiomyopathy  Gangrene of left foot (H)  Benign essential hypertension  Acute  conjunctivitis of left eye, unspecified acute conjunctivitis   type  Allergic conjunctivitis, left  Non-STEMI (non-ST elevated myocardial infarction) (H)  Abnormal CT scan, chest  Carotid stenosis, asymptomatic, right  S/P CABG x 3      Past Medical History:   Diagnosis Date     Atrial flutter (H)      Candidiasis of perineum 1/3/2018     Coronary artery disease due to lipid rich plaque     CABG x2     Diabetic ulcer of both feet (H) 10/31/2017     Dyslexia      Dyslipidemia, goal LDL below 70      Essential hypertension      Gangrene of left foot (H)      Neuropathy (H)      Paroxysmal Atrial Fibrillation     Brian Moe: 2011 Cardioversion; CHADS2 VASC = 5; he is on warfarin and sotalol      Type 2 diabetes mellitus, without long-term current use of insulin (H)      Unable to function independently 2017       Social History     Tobacco Use     Smoking status: Former Smoker     Packs/day: 1.00     Years: 36.00     Pack years: 36.00     Types: Cigarettes     Start date: 1964     Last attempt to quit: 2000     Years since quittin.6     Smokeless tobacco: Never Used   Substance Use Topics     Alcohol use: Yes     Alcohol/week: 3.0 oz     Types: 2 Glasses of wine, 3 Cans of beer per week       Review of Systems   Constitutional: Negative for chills and fever.   Skin: Positive for color change and wound.       Vitals:    18 1441   BP: 130/80   Pulse: 86   Resp: 17   Temp: 98  F (36.7  C)   SpO2: 99%   Weight: 197 lb (89.4 kg)       Physical Exam   Constitutional: He appears well-developed and well-nourished. No distress.   HENT:   Head: Normocephalic and atraumatic.   Right Ear: External ear normal.   Left Ear: External ear normal.   Eyes: Conjunctivae are normal. Right eye exhibits no discharge. Left eye exhibits no discharge.   Pulmonary/Chest: Effort normal. No respiratory distress.   Skin: He is not diaphoretic.   Annular wound on the right leg with yellow purulent drainage.   There is some surrounding cellulitis approximately 1/2 cm in diameter.  It is tender to palpation.  There is one other wound on the posterior aspect of the calf that appears to be well-healed with no signs of infection.  Another inferior main surgical site that has blood draining, but no discharge.   Psychiatric: He has a normal mood and affect. His behavior is normal. Judgment and thought content normal.       Labs:  Recent Results (from the past 72 hour(s))   Culture, Wound   Result Value Ref Range    Culture 4+ MRSA Coagulase Positive Staph (!)     Culture 1+ Gram Negative Rods (!)     Culture 1+ Enterococcus species (!)        Susceptibility    MRSA Coagulase Positive Staph - EMILY     Clindamycin <=0.5 Sensitive      Cefazolin >16 Resistant      Doxycycline <=0.5 Sensitive      Daptomycin <=1 Sensitive      Linezolid 1 Sensitive      Oxacillin >2 Resistant      Trimethoprim + Sulfamethoxazole >4/76 Resistant      Vancomycin 1 Sensitive            Clinical Decision Making:  Patient was originally started on Bactrim for suspicion of staph infection.  His lab results came back positive for MRSA which was resistant to Bactrim.  He was switched over to doxycycline by Monisha Maciel NP. He has follow up with wound care Monday 12/3/18.   At the end of the encounter, I discussed results, diagnosis, medications. Discussed red flags for immediate return to clinic/ER, as well as indications for follow up if no improvement. Patient understood and agreed to plan. Patient was stable for discharge.    1. Wound of left lower extremity, subsequent encounter  Culture, Wound    sulfamethoxazole-trimethoprim (SEPTRA DS) 800-160 mg per tablet         Patient Instructions   1. Dress wound with bandage and Bacitracin. Change dressing every 12 hours or sooner if dressing is saturated.   2. Begin taking Bactrim twice daily with meals.   3. Follow up on Monday with wound care.   4. We will notify your sister of your wound culture  results.

## 2021-06-22 NOTE — PROGRESS NOTES
ITP ASSESSMENT   Assessment Day: 60 Day    Session Number: 9  Precautions: Sternal/ S/P MI    Diagnosis: MI;CAB    Risk Stratification: Medium    Referring Provider: Masoud MEEHAN  Exercise Assessment: Reassessment    Pt is tolerating 30-40 min of exercise in Cardiac Rehab. Met levels are 2.2-2.4                       Exercise Plan  Goals Next 30 days  Leisure: Resume walking for home exercise.    Work: Resume PT work as a tristen      Education Goals: All goals in this section met    Education Goals Met: Patient can state cardiac s/s and appropriate emergency response.;Has system for taking medication.;Medication review.                          Goals Met  30 day ADL'S goals met: Goal not met- Pt not walking for home exercise    30 day Leisure goals met: Goal not met- Pt has not returned to PT work    30 Day Progression: Pt limited due to recent hospitalized for contact dermatitis.    Initial Leisure goals met: not met. he readily admits that he is not walking for home exercise.    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    Initial Progression: doing well in rehab but not participating in home exercise.      Exercise Prescription  Exercise Mode: Treadmill;Bike;Nustep;Arm Erg.    Frequency: 2 x a week    Duration: 30-40 min    Intensity / THR: 20-30 beats above resting heart rate    RPE 11-14  Progression / Met level: 2.5-3.2    Resistive Training?: Yes      Current Exercise (mins/week): 60      Interventions  Home Exercise:  Mode: Walk/ Bike    Frequency: 4-5 x a week    Duration: 20 min      Education Material : Educational videos;Provide written material;Individual education and counseling;Offer educational classes      Education Completed  Exercise Education Completed: Cardiac Anatomy;Signs and Symptoms;Medication review;RPE;Emergency Plan;Home Exercise;Warm up/cool down;FITT Principles;BP/HR Reponse to exercise;Benefits of Exercise;End point of exercise              Exercise  "Follow-up/Discharge  Follow up/Discharge: Reviewed home exercise.   NUTRITION  Nutrition Assessment: Reassessment      Nutrition Risk Factors:  Nutrition Risk Factors: Diabetes;Dyslipidemia  HbA1c: 7.2  Monitors blood sugar at home: Yes  Frequency: Daily \"the nurse at the assisted living does it\"  Cholesterol: 115  LDL: 63  HDL: 26  Triglycerides: 130      Nutrition Plan  Interventions  Other Nutrition Intervention: Diet Class;Therapist/Pt Discussion;Educational Videos;Provide with Written Material      Education Completed  Nutrition Education Completed: Risk factor overview;Low sodium diet;Weight management      Goals  Nutrition Goals (Next 30 days): Patient can identify their risk factors for CAD;Patient will follow a low sodium diet;Patient will follow a low saturated fat diet      Goals Met  Nutrition Goals Met: Completed Nutritional Risk Screen;Provided Rate your Plate Survey;Reviewed Dietitian schedule      Height, Weight, and  BMI  Weight: 201 lb (91.2 kg)  Height: 6' (1.829 m)  BMI: 27.25      Nutrition Follow-up  Follow-up/Discharge: Diet survey given to complete and return. Once returned we will set up appt with the dietician.         Other Risk Factors  Other Risk Factor Assessment: Reassessment      HTN Risk Factor: Hypertension      Pre Exercise BP: 120/68  Post Exercise BP: 108/56      Hypertension Plan  Goals  HTN Goals: Take medication as prescribed      Goals Met  HTN Goals Met: Follow low sodium diet;Take medication as prescribed;Exercises regularly      HTN Interventions  HTN Interventions: Therapist/patient discussion;Provide written material;Offer educational videos;Offer educational classes;Diet consult      HTN Education Completed  HTN Education Completed: Low sodium diet;Medication review;Risk factor overview    Risk Factor Follow-up   Follow-up/Discharge: Offer support and education for risk factor management     PSYCHOSOCIAL  Psychosocial Assessment: Reassessment        Psychosocial Risk " Factor: Stress      Psychosocial Plan  Interventions  Interventions: Offer educational videos and classes;Provide written material;Individual education and counseling      Education Completed  Education Completed: Relaxation/Coping Techniques;S/S of depression;Effects of stress on body      Goals  Goals (Next 30 days): Practicing stress management skills;Improvement in [employer] COOP score      Goals Met  Goals Met: Identified Support system;Oriented to stress management classes;Identify stressors      Psychosocial Follow-up  Follow-up/Discharge: Reviewed effects of stress.       Patient involved in Goal setting?: Yes      Signature: _____________________________________________________________    Date: __________________    Time: __________________See Doc Flowsheet

## 2021-06-22 NOTE — PROGRESS NOTES
Date of Service:12/20/2018    Chief Complaint:   Chief Complaint   Patient presents with     Wound Check       History:    The patient was last seen by me on 12/3//2018 when he developed new wounds and silvercel and an ABD was his ulcer was healed and TMC prescribed. He was advised to take both antibiotics for his infection, which was prescribed as a result of the cultures.  He is using his Dermafit (Tubigrip) for compression and has thrown away his old ones.    Current Outpatient Medications   Medication Sig Dispense Refill     acetaminophen (TYLENOL) 325 MG tablet Take 2 tablets (650 mg total) by mouth every 6 (six) hours as needed for pain.  0     albuterol (PROVENTIL) 2.5 mg /3 mL (0.083 %) nebulizer solution Take 3 mL (2.5 mg total) by nebulization every 6 (six) hours as needed for wheezing or shortness of breath.  0     aspirin 81 mg chewable tablet Chew 1 tablet (81 mg total) daily.  0     cetirizine (ZYRTEC) 10 MG tablet Take 10 mg by mouth daily.       dextromethorphan-guaifenesin (DIABETIC TUSSIN DM)  mg/5 mL Liqd Take 10 mL by mouth 4 (four) times a day as needed.       furosemide (LASIX) 40 MG tablet Take 40 mg by mouth daily.       gentamicin (GARAMYCIN) 0.1 % ointment Apply 1 application topically daily as needed.       glipiZIDE (GLUCOTROL) 10 MG tablet Take 10 mg by mouth daily before breakfast.       glipiZIDE (GLUCOTROL) 10 MG tablet Take 15 mg by mouth every evening.       Lactobacillus acidophilus 100 mg (1 billion cell) cap Take 1 capsule by mouth 2 (two) times a day. (Patient taking differently: Take 2 capsules by mouth 2 (two) times a day. ) 60 capsule 0     loratadine (CLARITIN) 10 mg tablet Take 10 mg by mouth daily.       metoprolol tartrate (LOPRESSOR) 25 MG tablet Take 1 tablet (25 mg total) by mouth 2 (two) times a day. 60 tablet 2     nitroglycerin (NITROSTAT) 0.4 MG SL tablet Place 0.4 mg under the tongue every 5 (five) minutes as needed for chest pain.        NOVOLOG U-100  INSULIN ASPART 100 unit/mL injection Check blood sugar four (4) times daily.  11.9 Type 2 without complications 10 mL PRN     omeprazole (PRILOSEC) 20 MG capsule Take 20 mg by mouth daily.        simvastatin (ZOCOR) 40 MG tablet Take 40 mg by mouth at bedtime.        spironolactone (ALDACTONE) 25 MG tablet Take 25 mg by mouth daily.       triamcinolone (KENALOG) 0.1 % cream Apply to your legs each leg at bedtime 7 days. 30 g 2     urea 10 % lotion Apply 1 application topically daily as needed.       warfarin (COUMADIN/JANTOVEN) 5 MG tablet Take one 5mg tablet tonight and have labs rechecked and coumadin dosed based on INR 5 tablet 0     Current Facility-Administered Medications   Medication Dose Route Frequency Provider Last Rate Last Dose     lidocaine 2 % jelly (XYLOCAINE)   Topical PRN Miriam Quinones, CNP   1 application at 12/20/18 0936       Allergies   Allergen Reactions     Latex      Penicillins Rash     Childhood rxn  -- tolerated cefazolin 10/4/18       Physical Exam:    Vitals:    12/20/18 0932   Pulse: 72    There is no height or weight on file to calculate BMI.    General:  73 y.o. male in no apparent distress.    Psychiatric:  Alert and oriented x 3.  Cooperative.   Integumentary:  Skin is uniformly warm and dry.    Lower extremity edema: minimal      Left lateral leg Ulceration(s):     Wound bed: %100 adherent slough          Undermining no, Tunneling no   Wound Edge: attached   Periwound:  There is no erika wound erythema, induration, maceration, denudement fluctuance or warmth surrounding the ulcer(s).  Exudate: serosanguaneous            Quantity: moderate  Odor:  absent   Wound Shape regular       Left medial incisional  Ulceration(s):     Wound bed: %100 adherent slough          Undermining no, Tunneling no   Wound Edge: attached   Periwound:  There is slight erika wound erythema, but no induration, maceration, denudement fluctuance or warmth surrounding the ulcer(s).  Exudate:  serosanguaneous            Quantity: moderate  Odor:  absent   Wound Shape regular      Labs:    Lab Results   Component Value Date    SEDRATE 21 (H) 08/31/2018     Lab Results   Component Value Date    CRP 4.5 (H) 09/01/2018     No components found for: CREATINE  Lab Results   Component Value Date    BUN 18 11/07/2018     Lab Results   Component Value Date    HGBA1C 6.8 (H) 11/19/2018         Vasc Edema 6/21/2018 7/12/2018 10/22/2018 11/12/2018 12/3/2018   Right just above MTP 23 24.2 23.5 24 23.5   Right Ankle 24 24.5 24 25 24.3   Right Widest Calf 35.7 37.8 35.5 39.1 36   Right Thigh Up 10cm 51.6 47 41.5 46.2 45.5    Left - just above MTP 27.3 27.8 26.5 27 26   Left Ankle 22.7 22.2 23.0 23 23.5   Left Widest Calf 34.2 33.3 32.5 35.1 34.8   Left Thigh Up 10cm 49 44.7 40.8 40.2 44.5       VASC Wound 10/22/18 left medial leg vein harvest (below knee) (Active)   Pre Size Length 0.9 12/20/2018  9:00 AM   Pre Size Width 0.8 12/20/2018  9:00 AM   Pre Size Depth 0.1 12/20/2018  9:00 AM   Pre Total Sq cm 0.72 12/20/2018  9:00 AM   Post Size Length 0.8 12/20/2018  9:00 AM   Post Size Width 0.8 12/20/2018  9:00 AM   Post Size Depth 0.1 12/20/2018  9:00 AM   Post Total Sq cm 0.7 12/3/2018  1:00 PM   Prodcut Used Bactroban 10/22/2018  2:00 PM       VASC Wound 11/12/18 left lateral lower leg (Active)   Pre Size Length 2.5 12/20/2018  9:00 AM   Pre Size Width 2 12/20/2018  9:00 AM   Pre Size Depth 0.1 12/20/2018  9:00 AM   Pre Total Sq cm 5 12/20/2018  9:00 AM   Post Size Length 2.5 12/20/2018  9:00 AM   Post Size Width 2 12/20/2018  9:00 AM   Post Size Depth 0.1 12/20/2018  9:00 AM   Undermined n 11/12/2018  2:00 PM   Tunneling n 11/12/2018  2:00 PM   Description weeping 12/3/2018  1:00 PM       Wound 01/25/18 Amputation Right;3rd digit (Active)       Wound 01/25/18 Non-pressure related ulcer Foot Left (Active)       Wound 08/11/18 Non-pressure related ulcer Leg Right;Anterior (front) (Active)       Incision 10/04/18 Chest  (Active)       Incision 10/04/18 Leg Left (Active)       Assessment:  1. Leg ulcer, left, limited to breakdown of skin (H)     2. PAD (peripheral artery disease) (H)  US Arterial Pressures Legs Bilateral   3. Arterial insufficiency (H)  US Arterial Pressures Legs Bilateral       A new wound was identified today: left lateral and medial leg ulcer      Plan:    1.  Debridement of the Left leg ulcer was recommended.  After consent was obtained and topical 2% Xylocaine was applied under clean conditions, and using a #15 blade,the devitalized dermal tissue was debrided for a total square centimeters of 3.2. Following debridement, there was a decrease in the nonviable tissue. The patient tolerated the procedure without any difficulty.     2. Venous and contact dermatitis, resolved.  TMC discontinued.    3.  Venous insufficiency,  Improved with the new Dermafit (Tubigrip).    4.  Left leg ulcer, improving    5.  PAD, has a history of a coronary artery bypass graft, but there are no EVELYNE of his lower extremity.  Referred for PAD testing secondary to his leg ulcers.    6.  Edema, improved    7.  Diabetes, on Metformin.  6.6 A1c on 11/1/2017.      8.  Treatment: For the wounds, he will discontinue silvercel and start Meglisorb  with an ABD  It will be secured with a roll gauze.  He will wear Dermafit (Tubigrip) for compression.    9.  Patient will follow up with me in 3 weeks  for further evaluation and response to the treatment.      Miriam Quinones, APRN, CNP,  [facility]  [phone]

## 2021-06-22 NOTE — PROGRESS NOTES
ITP ASSESSMENT   Assessment Day: 30 Day    Session Number: 5  Precautions: Post surgical    Diagnosis: MI;CAB    Risk Stratification: Medium    Referring Provider: Noé Newton MD  EXERCISE  Exercise Assessment: Reassessment     Pt continues to participate in outpatient cardiac rehab, tolerates lower level exercise well. He has not had any issues.                           Exercise Plan  Goals Next 30 days  Leisure: walk 15 min 2-3x/week    Work: resume PT plumbing jobs      Education Goals: All goals in this section met    Education Goals Met: Patient can state cardiac s/s and appropriate emergency response.;Has system for taking medication.;Medication review.                          Goals Met  Initial Leisure goals met: not met. he readily admits that he is not walking for home exercise.    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    Initial Progression: doing well in rehab but not participating in home exercise.      Exercise Prescription  Exercise Mode: Treadmill;Bike;Nustep;Arm Erg.    Frequency: 2x/week    Duration: 20-40    Intensity / THR: 20-30 beats above resting heart rate    RPE 11-14  Progression / Met level: 2-3    Resistive Training?: Yes      Current Exercise (mins/week): 80      Interventions  Home Exercise:  Mode: walk/bike    Frequency: 3-5x/ewek    Duration: 15 minutes      Education Material : Educational videos;Provide written material;Individual education and counseling;Offer educational classes      Education Completed  Exercise Education Completed: Cardiac Anatomy;Signs and Symptoms;Medication review;RPE;Emergency Plan;Home Exercise;Warm up/cool down;FITT Principles;BP/HR Reponse to exercise;Benefits of Exercise;End point of exercise              Exercise Follow-up/Discharge  Follow up/Discharge: encouraged home exercise           NUTRITION  Nutrition Assessment: Reassessment      Nutrition Risk Factors:  Nutrition Risk Factors:  Diabetes;Dyslipidemia  HbA1c: 7.2  Monitors blood sugar at home: Yes  Frequency: 2-3x/weel  Cholesterol: 115  LDL: 63  HDL: 26  Triglycerides: 130      Nutrition Plan  Interventions  Other Nutrition Intervention: Diet Class;Therapist/Pt Discussion      Education Completed  Nutrition Education Completed: Risk factor overview;Low sodium diet;Weight management      Goals  Nutrition Goals (Next 30 days): Patient can identify their risk factors for CAD;Patient will follow a low sodium diet;Patient will follow a low saturated fat diet      Goals Met  Nutrition Goals Met: Completed Nutritional Risk Screen;Provided Rate your Plate Survey;Reviewed Dietitian schedule      Height, Weight, and  BMI  Weight: 193 lb (87.5 kg)  Height: 6' (1.829 m)  BMI: 26.17      Nutrition Follow-up  Follow-up/Discharge: Diet survey given to complete and return. Once returned we will set up appt with the dietician.       Other Risk Factors  Other Risk Factor Assessment: Reassessment      HTN Risk Factor: Hypertension      Pre Exercise BP: 140/70  Post Exercise BP: 144/64      Hypertension Plan  Goals  HTN Goals: Patient demonstrates understanding of HTN, no goals identified for the next 30 days      Goals Met  HTN Goals Met: Follow low sodium diet;Take medication as prescribed;Exercises regularly      HTN Interventions  HTN Interventions: Therapist/patient discussion;Provide written material;Offer educational videos;Offer educational classes      HTN Education Completed  HTN Education Completed: Low sodium diet;Medication review;Risk factor overview        Risk Factor Follow-up   Follow-up/Discharge: Pt reports no table salt use. Scheduled to see dietician 12/27/2018         PSYCHOSOCIAL  Psychosocial Assessment: Reassessment       Trip LUU Q of L Summary Score: 17      AMARILIS-D Score: 3      Psychosocial Risk Factor: Stress      Psychosocial Plan  Interventions  Interventions: Offer educational videos and classes;Provide written  material;Individual education and counseling      Education Completed  Education Completed: Relaxation/Coping Techniques;S/S of depression;Effects of stress on body      Goals  Goals (Next 30 days): Practicing stress management skills      Goals Met  Goals Met: Identified Support system;Oriented to stress management classes;Identify stressors      Psychosocial Follow-up  Follow-up/Discharge: Pt reports that stress is not an issue for him.           Patient involved in Goal setting?: Yes      Signature: _____________________________________________________________    Date: __________________    Time: __________________See Doc Flowsheet

## 2021-06-23 NOTE — PATIENT INSTRUCTIONS - HE
Podiatry Clinics that offer foot and toenail care  [facility]  [facility]  [phone]    [facility] Ankle Clinics, [facility]  [phone]    [facility]  [place] - [address] on Tuesdays  [phone]    [facility]  [place]  [phone]    [facility]  [facility] and [place]  [phone]    [facility]  [phone]    [facility]  [phone]    [facility]  They have several locations and have a team of registered nurses that offer diabetic foot care.  They do not bill to insurance and the average cost per visit is $37.  [facility]  [phone]    [facility]  *Nurse comes to your home for nail care.  Miriam Dyson RN Foot Specialist  [phone]

## 2021-06-23 NOTE — PROGRESS NOTES
Admission History & Physical  Ever Crane, 1945, [id number]    [facility]  Noé Newton MD, [phone]    Extended Emergency Contact Information  Primary Emergency Contact: Miriam Riggs  Address: [address] [place] [address] [place] [facility]  Home Phone: [phone]  Mobile Phone: [phone]  Relation: Sibling  Secondary Emergency Contact: Marie Tobias  Address: [address] [address] [facility]  Home Phone: [phone]  Mobile Phone: [phone]  Relation: Sibling     Assessment and Plan:   Assessment: Onychomycosis, onychauxis, onychocryptosis, diabetes mellitus  Plan: Debrided nails 1 through 5 right foot.  Active Problems:    * No active hospital problems. *      Chief Complaint:  Thick toenails right foot     HPI:    Ever Crane is a 73 y.o. old male who presented to the clinic today complaining of long thick toenails right foot.  The patient stated that the right great toenail has been bleeding recently.  He has no pain.  The patient stated that he is diabetic.  He does not know his A1c. He does not know if his blood sugar is well controlled.  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  The patient stated that he has some numbness in the right foot.  The right great toenail does not cause any pain.  He is able to wear shoes without discomfort.  He denies any other previous treatment.  History is provided by patient    Medical History  Active Ambulatory (Non-Hospital) Problems    Diagnosis     Contact dermatitis due to cosmetics, unspecified contact dermatitis type     Other atopic dermatitis     Atopic keratoconjunctivitis     Chronic venous stasis dermatitis     S/P CABG x 3     Carotid stenosis, asymptomatic, right     Abnormal CT scan, chest     Non-STEMI (non-ST elevated myocardial infarction) (H)     Bilateral lower leg cellulitis     Allergic conjunctivitis, bilateral     Acute  conjunctivitis of left eye, unspecified acute conjunctivitis type     Cellulitis     Fever     Pneumonia     Wound, open, foot, left, sequela     Medically noncompliant     Benign essential hypertension     Heart failure with preserved ejection fraction, NYHA class II (H)     PAD (peripheral artery disease) (H)     Coronary artery disease due to lipid rich plaque     Acquired lymphedema of lower extremity     Type 2 diabetes mellitus with complication, without long-term current use of insulin (H)     Venous hypertension, chronic, bilateral     Polyneuropathy associated with underlying disease (H)     Dyslipidemia, goal LDL below 70     Persistent atrial fibrillation (H)     Typical atrial flutter (H)     Past Medical History:   Diagnosis Date     Atrial flutter (H)      Candidiasis of perineum 1/3/2018     Coronary artery disease due to lipid rich plaque 2000     Diabetic ulcer of both feet (H) 10/31/2017     Dyslexia      Dyslipidemia, goal LDL below 70 2000     Essential hypertension      Gangrene of left foot (H)      MRSA (methicillin resistant Staphylococcus aureus)      Neuropathy (H)      Paroxysmal Atrial Fibrillation      Type 2 diabetes mellitus, without long-term current use of insulin (H)      Unable to function independently 11/13/2017     Patient Active Problem List    Diagnosis Date Noted     Contact dermatitis due to cosmetics, unspecified contact dermatitis type      Other atopic dermatitis      Atopic keratoconjunctivitis      Chronic venous stasis dermatitis      S/P CABG x 3      Carotid stenosis, asymptomatic, right      Abnormal CT scan, chest      Non-STEMI (non-ST elevated myocardial infarction) (H)      Bilateral lower leg cellulitis 08/31/2018     Allergic conjunctivitis, bilateral      Acute conjunctivitis of left eye, unspecified acute conjunctivitis type      Cellulitis 08/11/2018     Fever 06/28/2018     Pneumonia 06/26/2018     Wound, open, foot, left, sequela 06/20/2018     Medically  noncompliant 06/20/2018     Benign essential hypertension      Heart failure with preserved ejection fraction, NYHA class II (H) 01/25/2018     PAD (peripheral artery disease) (H) 11/14/2017     Coronary artery disease due to lipid rich plaque      Acquired lymphedema of lower extremity 10/16/2017     Type 2 diabetes mellitus with complication, without long-term current use of insulin (H)      Venous hypertension, chronic, bilateral 08/10/2017     Polyneuropathy associated with underlying disease (H) 08/10/2017     Dyslipidemia, goal LDL below 70      Persistent atrial fibrillation (H)      Typical atrial flutter (H)      Surgical History  He  has a past surgical history that includes Coronary artery bypass graft (3/6/2000); Cardioversion (8/25/2011); Inguinal hernia repair (Bilateral, 1969 and 1979); REDO STERNOTOMY, CORONARY ARTERY BYPASS X3, ENDOSCOPIC VEIN HARVEST, INTERNAL MAMMARY ARTERY, ANESTHESIA TRANSESOPHAGEAL ECHOCARDIOGRAM AND EPI AORTIC ULTRASOUND (N/A, 10/4/2018); Coronary Angiogram (N/A, 10/1/2018); Fractional Flow Ratio Wire (N/A, 10/1/2018); and LEFT TRANSMETATARSAL AMPUTATION (Left, 11/5/2017).   Past Surgical History:   Procedure Laterality Date     CARDIOVERSION  8/25/2011     CORONARY ARTERY BYPASS GRAFT  3/6/2000    SVG to OM1, SVG to PDA     CV CORONARY ANGIOGRAM N/A 10/1/2018    Procedure: Coronary Angiogram;  Surgeon: Miki Mac MD;  Location: [facility];  Service:      FOOT AMPUTATION Left 11/5/2017    Procedure: LEFT TRANSMETATARSAL AMPUTATION;  Surgeon: Ever Wick MD;  Location: [facility] OR;  Service:      INGUINAL HERNIA REPAIR Bilateral 1969 and 1979    Social History  Reviewed, and he  reports that he quit smoking about 18 years ago. His smoking use included cigarettes. He started smoking about 54 years ago. He has a 36.00 pack-year smoking history. he has never used smokeless tobacco. He reports that he drinks about 3.0 oz of alcohol per week. He reports  "that he does not use drugs.  Social History     Tobacco Use     Smoking status: Former Smoker     Packs/day: 1.00     Years: 36.00     Pack years: 36.00     Types: Cigarettes     Start date: 1964     Last attempt to quit: 2000     Years since quittin.7     Smokeless tobacco: Never Used   Substance Use Topics     Alcohol use: Yes     Alcohol/week: 3.0 oz     Types: 2 Glasses of wine, 3 Cans of beer per week      Allergies  Allergies   Allergen Reactions     Latex      Penicillins Rash     Childhood rxn  -- tolerated cefazolin 10/4/18    Family History  Reviewed, and family history includes CABG in his father; Esophageal cancer (age of onset: 58) in his father; No Medical Problems in his grandchild, grandchild, and son; Sudden death (age of onset: 85) in his mother; Valvular heart disease in his father.   Psychosocial Needs  Social History     Social History Narrative    Ever lived with his son Raciel in 2017, but then he went to [facility] after foot amputation.  Ever states he will move to a facility in [place] in . The [facility] papers say he uses the \"blue ride\" but Ever states he has his own vehicle on the campus and is still doing plumbing work in the spring 2018.  Our reception staff at [facility] in [place] confirmed on 2018 that Ever brought himself to the campus and did not utilize a family member or ride service.  I would also note that he states his granddaughter is name Claire and not Leonela, so I corrected that in the chart (ROSALIA Moe MD).     Additional psychosocial needs reviewed per nursing assessment.       Prior to Admission Medications     (Not in a hospital admission)        Review of Systems -10 point review of system was performed.  All were negative.      Ojective findings: General: The patient is alert and in no acute distress.  Patient is cooperative.     Mucous membranes moist.        Integument: Nails right foot are elongated " and 2-3 times normal thickness.  The lateral border of the right great toenail is severely incurvated.  There is some mild bleeding along the lateral margin of the right great toenail.  Skin bilaterally cool and dry and scaly.Neurologic: Negative clonus, negative Babinski bilateral feet.       Vascular: DP and PT pulses absent bilaterally.  +2/4 bilateral feet.      Musculoskeletal: Range of motion within normal limits bilateral feet.  Muscle power +5/5 bilaterally in all compartments.  There is been a trans-met amputation left foot.      Assessment: Hallux abductovalgus right foot, pronation deformity bilateral feet     Plan:  I debrided the nails 1 through 5 of the right foot today.  I informed the patient that he has no sign of any infection involving the right great toenail.  He is to return to the clinic as needed.

## 2021-06-23 NOTE — TELEPHONE ENCOUNTER
Patient's sister Miriam called to inform staff that she is unable to attend patient's visit today. She would like a call after patient's appointment if there have been any updates to patient's wound and/or medications.   She would also like staff to remind pt to bring paperwork and new orders to the nurse at his care facility as well.

## 2021-06-23 NOTE — TELEPHONE ENCOUNTER
----- Message from Miriam Quinones CNP sent at 1/25/2019  9:58 AM CST -----  Culture shows that he has an infection, but does not need a different antibiotic.  He should continue taking clindamycin.

## 2021-06-23 NOTE — PATIENT INSTRUCTIONS - HE
Important Instructions     * Stop using your current products and dressings    Make sure that you are taking Doxycycline, your antibiotic.    Make sure to bring your steroid cream with to your appointments so the nurse can apply it prior            How to care for your lateral leg wound    Cleanse wound with saline sent to you with your supplies or a wound wash found at the pharmacy.    *   Cut to fit the Iodofoam and place in or on the wound.      *   Cover the wound with ABD     *   Change dressinx/week    *   Apply a 2-layer lite on the left leg  for compression. Compression is needed to decrease your swelling and heal any sores.  Use the velcro compression wraps on your right leg.    For the Medial leg, apply Medihoney and non adhesive foam secured with a Medipore or cloth tape.  He is allergic to many adhesives.    *   Do not get your ulcer wet when you shower or take a bath.  You can cover it with cling wrap, a bag taped to your skin or a cast protector.    *   Good nutrition is important for wound healing.  I recommend increasing your protein.  You can do this through your diet, nutritional supplements, and/or protein powder.      Please call [facility] before substituting any product    Call our clinic nurse at [phone] if there is an increase in drainage, pain, redness, or the wound size increases.  This maybe a sign of infection and require attention prior to the next appointment      Miriam Quinones, APRN, CNP, CWCN

## 2021-06-23 NOTE — PATIENT INSTRUCTIONS - HE
- Treatment:  Layered Compression Bandaging (2-layer)    What is it?  The layered compression bandaging has a layer of absorbent material that will soak up drainage.     Why we do it.   This is done to treat swelling, wounds, or both.  This will in turn help circulation and healing.    How to care for your bandages.  The wraps need to be kept dry. If  the wraps become wet, remove them and call the clinic to have another wrap applied.    What to expect.  It is common for the wraps to be uncomfortable at the beginning. The first two days are usually the hardest; then they will become more comfortable.       Elevating your legs will help the discomfort. Try to elevate your legs as much as possible.    If rest and elevation does not help your discomfort, call your provider.  If your provider is not available you can remove the wrap and leave a message for further instructions.    - Swelling and Compression Therapy    Swelling in the legs can be caused by many reasons. No matter what the reason, treatment usually includes some type of compression. This may be done with a support sock, dressing, ace wrap, or layered wraps.     It is important to treat the swelling for many reasons. If the swelling is not treated you may develop blisters that can lead to ulcerations. This is caused when extra fluid goes into tissue causing damage and blocking blood flow to the tissue.     It is important that you wear your compression every day, including days that you will be seen in clinic.     Compression is often the most important part of treating leg wounds. Without controlling the swelling it is often not possible to heal wounds.     Going without compression for even brief periods of time can be damaging to your legs and your health.  Your compression should be put on first thing in the morning. Take the compression off at night only when instructed by your care provider to do so. Sometimes wearing compression 24 hours a day will  be recommended.       If you are having difficulty wearing your compression it is important to notify your care provider so that other options may be reviewed.    Thank you for choosing [facility]. Please call us if you have any questions [phone].

## 2021-06-23 NOTE — PROGRESS NOTES
ITP ASSESSMENT   Assessment Day: 90 Day    Session Number: 15  Precautions: Sternal/ S/P MI    Diagnosis: MI;CAB    Risk Stratification: Medium    Referring Provider: Masoud MEEHAN  Exercise Assessment: Reassessment    Pt is exercising for 40 min in CR without sx's. Met levels are 2.2-2.3                       Exercise Plan  Goals Next 30 days  Leisure: Increasing walk time to 35-40 min    Work: Resume PT plumming      Education Goals: All goals in this section met    Education Goals Met: Patient can state cardiac s/s and appropriate emergency response.;Has system for taking medication.;Medication review.                          Goals Met  60 day Leisure goals met: Goal met- Pt has started walking for home exercise    60 day Work goals met: Goal not met- Pt has not returned to work yet.     60 Day Progression: Pt is progressing without cardiac sx's. Goals reviewed and updated.      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      Initial Leisure goals met: not met. he readily admits that he is not walking for home exercise.    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    Initial Progression: doing well in rehab but not participating in home exercise.      Exercise Prescription  Exercise Mode: Treadmill;Bike;Nustep;Arm Erg.    Frequency: 2 x week    Duration: 40 min    Intensity / THR: 20-30 beats above resting heart rate    RPE 11-14  Progression / Met level: 2.7-3.4    Resistive Training?: Yes      Current Exercise (mins/week): 60      Interventions  Home Exercise:  Mode: Walk/ Bike    Frequency: 4-5 x week    Duration: 35-40 min      Education Material : Educational videos;Provide written material;Individual education and counseling;Offer educational classes      Education Completed  Exercise  Education Completed: Cardiac Anatomy;Signs and Symptoms;Medication review;RPE;Emergency Plan;Home Exercise;Warm up/cool down;FITT Principles;BP/HR Reponse to exercise;Benefits of Exercise;End point of exercise              Exercise Follow-up/Discharge  Follow up/Discharge: Reviewed home exercise   NUTRITION  Nutrition Assessment: Reassessment      Nutrition Risk Factors:  Nutrition Risk Factors: Diabetes;Dyslipidemia  HbA1c: 7.2  Monitors blood sugar at home: Yes  Frequency: Daily- at the assissted living  Cholesterol: 115  LDL: 63  HDL: 26  Triglycerides: 130      Nutrition Plan  Interventions  Other Nutrition Intervention: Diet Class;Therapist/Pt Discussion;Educational Videos;Provide with Written Material      Education Completed  Nutrition Education Completed: Risk factor overview;Low sodium diet;Weight management      Goals  Nutrition Goals (Next 30 days): Patient can identify their risk factors for CAD;Patient will follow a low sodium diet;Patient will follow a low saturated fat diet      Goals Met  Nutrition Goals Met: Completed Nutritional Risk Screen;Provided Rate your Plate Survey;Reviewed Dietitian schedule      Height, Weight, and  BMI  Weight: 200 lb (90.7 kg)  Height: 6' (1.829 m)  BMI: 27.12      Nutrition Follow-up  Follow-up/Discharge: Diet survey given to complete and return. Once returned we will set up appt with the dietician.         Other Risk Factors  Other Risk Factor Assessment: Reassessment      HTN Risk Factor: Hypertension      Pre Exercise BP: 128/70  Post Exercise BP: 114/60      Hypertension Plan  Goals  HTN Goals: Take medication as prescribed      Goals Met  HTN Goals Met: Follow low sodium diet;Take medication as prescribed;Exercises regularly      HTN Interventions  HTN Interventions: Therapist/patient discussion;Provide written material;Offer educational videos;Offer educational classes;Diet consult      HTN Education Completed  HTN Education Completed: Low sodium diet;Medication  review;Risk factor overview      Tobacco Risk Factor: NA    Risk Factor Follow-up   Follow-up/Discharge: Continue education for risk factor management   PSYCHOSOCIAL  Psychosocial Assessment: Reassessment      Psychosocial Risk Factor: Stress      Psychosocial Plan  Interventions  Interventions: Offer educational videos and classes;Provide written material;Individual education and counseling      Education Completed  Education Completed: Relaxation/Coping Techniques;S/S of depression;Effects of stress on body      Goals  Goals (Next 30 days): Practicing stress management skills;Improvement in [employer] score      Goals Met  Goals Met: Identified Support system;Oriented to stress management classes;Identify stressors      Psychosocial Follow-up  Follow-up/Discharge: Pt reports he is managing his stress without difficulty             Patient involved in Goal setting?: Yes      Signature: _____________________________________________________________    Date: __________________    Time: __________________See Doc Flowsheet

## 2021-06-24 NOTE — TELEPHONE ENCOUNTER
Who is calling:  Patient's center called to cancel appointment at [facility] center on 2/20 due to transportation, difficulties getting to clinic. Will call back to reschedule. 2/20/19.Or you can call to reschedule.   Reason for Call:  See above, patient's sister called after clinic closed on 2/19 so could not cancel the appointment as I'm with [facility]   Date of last appointment with primary care: N/A  Has the patient been recently seen: N/A  Okay to leave a detailed message: N/A

## 2021-06-24 NOTE — TELEPHONE ENCOUNTER
Pt sister called and left a message that pt might not make it to his appt. With Miriam today due to transportation. She is aware he will need to reschedule if he is going to be late. Pt needs to be seen by 2/21/19 due to 2 layer wrap.     Pt was rescheduled to see Miriam tomorrow. His sister is aware that if he is unable to come to the appt. Due to weather he will need to schedule a nurse visit for Thursday to change his 2 layer dressing.

## 2021-06-24 NOTE — PATIENT INSTRUCTIONS - HE
Today we changed Ever's dressings on both wounds and wrapped the left leg with a 2 layer compression wrap. Please call us if you have any questions or concerns.

## 2021-06-24 NOTE — PROGRESS NOTES
03/07/19 1300   Post Exercise    Comments/Plan Previous 15 sessions pt was in NSR, today after being out of rehab for 6 weeks he returned today and is in Afib/flutter. He is on Coumadin and is due to see you next month.    See Doc Flowsheet

## 2021-06-24 NOTE — PATIENT INSTRUCTIONS - HE
[facility] Home Exercise Program    Exercise Goal: try to walk or ride     Modality Duration Intensity   Warm-up     Stretching     Walk 5-10 minutes several times a day.    Bike     Treadmill     Other:      Cool Down     Strength     Stretching       Target Heart Rate: as tolerated    Range of Perceived Exertion: as tolerated    Perceived exertion  (RPE)  Rakesh Scale   6  7      Very, very light  8  9      Very light  10  11    Fairly light  12  13    Somewhat hard  14  15    Hard  16  17    Very hard  18  19    Very, very hard  20     Special Comments/ Recommendations:  -Lipid Profile with Physician  -start checking your blood sugars daily. Latest A1C is 10.2  -try to limit unhealthy foods/snack    Stop Exercise!!! If any of the following occur:    Angina/chest pain    Dizziness    Excessive perspiration/cold sweats    Abnormal shortness of breath    Changes in heart rate (slow, fast, irregular)    Sudden fatigue or numbness    Nausea      Also...    Avoid extreme temperatures - exercise indoors if necessary    Wait at least 1 hour after a meal before strenuous activity    Do not exercise if you have a fever or are ill    Wear comfortable, supportive athletic clothing

## 2021-06-24 NOTE — PATIENT INSTRUCTIONS - HE
- Treatment:  Layered Compression Bandaging (2-layer)    What is it?  The layered compression bandaging has a layer of absorbent material that will soak up drainage.     Why we do it.   This is done to treat swelling, wounds, or both.  This will in turn help circulation and healing.    How to care for your bandages.  The wraps need to be kept dry. If  the wraps become wet, remove them and call the clinic to have another wrap applied.    What to expect.  It is common for the wraps to be uncomfortable at the beginning. The first two days are usually the hardest; then they will become more comfortable.       Elevating your legs will help the discomfort. Try to elevate your legs as much as possible.    If rest and elevation does not help your discomfort, call your provider.  If your provider is not available you can remove the wrap and leave a message for further instructions.    - Swelling and Compression Therapy    Swelling in the legs can be caused by many reasons. No matter what the reason, treatment usually includes some type of compression. This may be done with a support sock, dressing, ace wrap, or layered wraps.     It is important to treat the swelling for many reasons. If the swelling is not treated you may develop blisters that can lead to ulcerations. This is caused when extra fluid goes into tissue causing damage and blocking blood flow to the tissue.     It is important that you wear your compression every day, including days that you will be seen in clinic.     Compression is often the most important part of treating leg wounds. Without controlling the swelling it is often not possible to heal wounds.     Going without compression for even brief periods of time can be damaging to your legs and your health.  Your compression should be put on first thing in the morning. Take the compression off at night only when instructed by your care provider to do so. Sometimes wearing compression 24 hours a day will  be recommended.       If you are having difficulty wearing your compression it is important to notify your care provider so that other options may be reviewed.    Thank you for choosing [facility]. Please call us if you have any questions [phone].

## 2021-06-24 NOTE — PROGRESS NOTES
ITP ASSESSMENT   Assessment Day: 120 Day    Session Number: 15  Precautions: Falls    Diagnosis: MI;CAB    Risk Stratification: Medium    Referring Provider: Noé Newton MD  EXERCISE  Exercise Assessment: Reassessment     Pt has been out of rehab since 1/31/19. Update done, will review plan with pt when he returns.                         Exercise Plan  Goals Next 30 days    Education Goals: All goals in this section met    Education Goals Met: Patient can state cardiac s/s and appropriate emergency response.;Has system for taking medication.;Medication review.                          Goals Met  90 Day Progress: Pt has been out of rehab - will review when pt returns      60 day Leisure goals met: Goal met- Pt has started walking for home exercise    60 day Work goals met: Goal not met- Pt has not returned to work yet.     60 Day Progression: Pt is progressing without cardiac sx's. Goals reviewed and updated.      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      Initial Leisure goals met: not met. he readily admits that he is not walking for home exercise.    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    Initial Progression: doing well in rehab but not participating in home exercise.      Exercise Prescription  Exercise Mode: Treadmill;Bike;Nustep;Arm Erg.    Frequency: 2x/week    Duration: 40'    Intensity / THR: 20-30 beats above resting heart rate    RPE 11-14  Progression / Met level: 2.7-3.4    Resistive Training?: Yes      Current Exercise (mins/week): 60      Interventions  Home Exercise:  Mode: Walk/bike    Frequency: 4-5x/week    Duration: 35-40'      Education Material : Educational videos;Provide written material;Individual education and counseling;Offer educational classes      Education  Completed  Exercise Education Completed: Cardiac Anatomy;Signs and Symptoms;Medication review;RPE;Emergency Plan;Home Exercise;Warm up/cool down;FITT Principles;BP/HR Reponse to exercise;Benefits of Exercise;End point of exercise              Exercise Follow-up/Discharge  Follow up/Discharge: Will review when pt returns   NUTRITION  Nutrition Assessment: Reassessment      Nutrition Risk Factors:  Nutrition Risk Factors: Diabetes;Dyslipidemia  HbA1c: 7.2  Monitors blood sugar at home: Yes  Frequency: Daily  Cholesterol: 115  LDL: 63  HDL: 26  Triglycerides: 130      Nutrition Plan  Interventions    Other Nutrition Intervention: Diet Class;Therapist/Pt Discussion;Educational Videos;Provide with Written Material      Education Completed  Nutrition Education Completed: Low Saturated fat diet;Risk factor overview;Low sodium diet;Weight management      Goals  Nutrition Goals (Next 30 days): Patient can identify their risk factors for CAD;Patient will follow a low sodium diet;Patient will follow a low saturated fat diet      Goals Met  Nutrition Goals Met: Completed Nutritional Risk Screen;Provided Rate your Plate Survey;Reviewed Dietitian schedule      Height, Weight, and  BMI  Weight: 200 lb (90.7 kg)  Height: 6' (1.829 m)  BMI: 27.12      Nutrition Follow-up  Follow-up/Discharge: Diet survey given to complete and return. Once returned we will set up appt with the dietician.         Other Risk Factors  Other Risk Factor Assessment: Reassessment      HTN Risk Factor: Hypertension      Pre Exercise BP: 128/70  Post Exercise BP: 114/60      Hypertension Plan  Goals  HTN Goals: Patient demonstrates understanding of HTN, no goals identified for the next 30 days      Goals Met  HTN Goals Met: Follow low sodium diet;Take medication as prescribed;Exercises regularly      HTN Interventions  HTN Interventions: Diet consult;Therapist/patient discussion;Provide written material;Offer educational videos;Offer educational  classes      HTN Education Completed  HTN Education Completed: Low sodium diet;Medication review;Risk factor overview      Tobacco Risk Factor: NA      Risk Factor Follow-up   Follow-up/Discharge: will review when pt returns     PSYCHOSOCIAL  Psychosocial Assessment: Reassessment       [facility] Q of L Summary Score: 17      AMARILIS-D Score: 3      Psychosocial Risk Factor: Stress      Psychosocial Plan  Interventions  Interventions: Offer Spiritual Care consult;Offer educational videos and classes;Provide written material;Individual education and counseling      Education Completed  Education Completed: Relaxation/Coping Techniques;S/S of depression;Effects of stress on body      Goals  Goals (Next 30 days): Practicing stress management skills;Improvement in [facility] COOP score      Goals Met  Goals Met: Identified Support system;Oriented to stress management classes;Identify stressors      Psychosocial Follow-up  Follow-up/Discharge: Will review when pt returns             Patient involved in Goal setting?: No      Signature: _____________________________________________________________    Date: __________________    Time: __________________

## 2021-06-24 NOTE — PROGRESS NOTES
ITP ASSESSMENT   Assessment Day: 150 Day/DISCHARGE    Session Number: 16  Precautions: Falls    Diagnosis: MI;CAB    Risk Stratification: Medium    Referring Provider: Noé Newton MD  EXERCISE  Exercise Assessment: Discharge     Ever started out patient CR on 11/15/2018 and has been out since 1/31/2019 for transportation issues and has opted to discharge today.  He currently resides in an Assisted Living Facility, blood sugars are not being checked. Last A1c 10.2.     Education Goals: All goals in this section met    Education Goals Met: Patient can state cardiac s/s and appropriate emergency response.;Has system for taking medication.;Medication review.                          Goals Met  90 day ADL'S goals met: Pt is not exercising.    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    90 Day Progress: Pt has been out of rehab since 1/31/2019     60 day Leisure goals met: Goal met- Pt has started walking for home exercise    60 day Work goals met: Goal not met- Pt has not returned to work yet.     60 Day Progression: Pt is progressing without cardiac sx's. Goals reviewed and updated.      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      Initial Leisure goals met: not met. he readily admits that he is not walking for home exercise.    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    Initial Progression: doing well in rehab but not participating in home exercise.      Exercise Prescription  Exercise Mode: Treadmill;Bike;Nustep;Arm Erg.    Frequency: 2x/week    Duration: 40'    Intensity / THR: 20-30 beats above resting heart rate    RPE 11-14  Progression / Met level: 2.5-3.5    Resistive Training?: Yes      Current Exercise (mins/week): 0      Interventions  Home  Exercise:  Mode: walk    Frequency: 4-5x/week    Duration: 10-30 min      Education Material : Educational videos;Provide written material;Individual education and counseling;Offer educational classes      Education Completed  Exercise Education Completed: Cardiac Anatomy;Signs and Symptoms;Medication review;RPE;Emergency Plan;Home Exercise;Warm up/cool down;FITT Principles;BP/HR Reponse to exercise;Benefits of Exercise;End point of exercise              Exercise Follow-up/Discharge  Follow up/Discharge: Pt lives in assisted living facility and does not do much socially, work related or exercise.   NUTRITION  Nutrition Assessment: Discharge      Nutrition Risk Factors:  Nutrition Risk Factors: Diabetes;Dyslipidemia  HbA1c: 10.2  Monitors blood sugar at home: No  Frequency: he reports he doesn't have strips  Cholesterol: 115  LDL: 63  HDL: 26  Triglycerides: 130      Nutrition Plan  Interventions  Other Nutrition Intervention: Therapist/Pt Discussion      Education Completed  Nutrition Education Completed: Low Saturated fat diet;Risk factor overview;Low sodium diet;Weight management      Goals  Nutrition Goals (Next 30 days): Patient can identify their risk factors for CAD;Patient will follow a low sodium diet;Patient will follow a low saturated fat diet      Goals Met  Nutrition Goals Met: Completed Nutritional Risk Screen;Provided Rate your Plate Survey;Reviewed Dietitian schedule      Height, Weight, and  BMI  Weight: 200 lb (90.7 kg)  Height: 6' (1.829 m)  BMI: 27.12      Nutrition Follow-up  Follow-up/Discharge: Diet survey given to complete and return. Once returned we will set up appt with the dietician.         Other Risk Factors  Other Risk Factor Assessment: Discharge      HTN Risk Factor: Hypertension      Pre Exercise BP: 126/60  Post Exercise BP: 114/60      Hypertension Plan  Goals  HTN Goals: Patient demonstrates understanding of HTN, no goals identified for the next 30 days      Goals Met  HTN Goals  Met: Follow low sodium diet;Take medication as prescribed;Exercises regularly      HTN Interventions  HTN Interventions: Diet consult;Therapist/patient discussion;Provide written material;Offer educational videos;Offer educational classes      HTN Education Completed  HTN Education Completed: Low sodium diet;Medication review;Risk factor overview      Risk Factor Follow-up   Follow-up/Discharge: Pt reports that Assisted Living facility does not provide heart healthy/low sodium food. Also no blood sugars checks because he doesn't have strips.      PSYCHOSOCIAL  Psychosocial Assessment: Discharge       [facility] Q of L Summary Score: 17      AMARILIS-D Score: 3      Psychosocial Risk Factor: Stress      Psychosocial Plan  Interventions  Interventions: Offer Spiritual Care consult;Offer educational videos and classes;Provide written material;Individual education and counseling      Education Completed  Education Completed: Relaxation/Coping Techniques;S/S of depression;Effects of stress on body      Goals  Goals (Next 30 days): Practicing stress management skills;Improvement in [facility] score      Goals Met  Goals Met: Identified Support system;Oriented to stress management classes;Identify stressors      Psychosocial Follow-up  Follow-up/Discharge: Will review when pt returns             Patient involved in Goal setting?: No      Signature: _____________________________________________________________    Date: __________________    Time: __________________See Doc Flowsheet

## 2021-06-24 NOTE — PATIENT INSTRUCTIONS - HE
- Please call us if your compression wraps fall more than 1-2 inches below the bend of the knee. Call if they are too painful. Call if they get wet. If it is a weekend and the wraps fall down, are too painful, or get wet take the wraps off and put on another form of compression. Compression such as velcro wraps, compression stockings, short stretch bandages, or tubular compression. Apply one of these until you can be seen in clinic. Please call us if you have any questions [phone].    - Treatment:  Layered Compression Bandaging (2-layer)    What is it?  The layered compression bandaging has a layer of absorbent material that will soak up drainage.     Why we do it.   This is done to treat swelling, wounds, or both.  This will in turn help circulation and healing.    How to care for your bandages.  The wraps need to be kept dry. If  the wraps become wet, remove them and call the clinic to have another wrap applied.    What to expect.  It is common for the wraps to be uncomfortable at the beginning. The first two days are usually the hardest; then they will become more comfortable.       Elevating your legs will help the discomfort. Try to elevate your legs as much as possible.    If rest and elevation does not help your discomfort, call your provider.  If your provider is not available you can remove the wrap and leave a message for further instructions.    - Swelling and Compression Therapy    Swelling in the legs can be caused by many reasons. No matter what the reason, treatment usually includes some type of compression. This may be done with a support sock, dressing, ace wrap, or layered wraps.     It is important to treat the swelling for many reasons. If the swelling is not treated you may develop blisters that can lead to ulcerations. This is caused when extra fluid goes into tissue causing damage and blocking blood flow to the tissue.     It is important that you wear your compression every day,  including days that you will be seen in clinic.     Compression is often the most important part of treating leg wounds. Without controlling the swelling it is often not possible to heal wounds.     Going without compression for even brief periods of time can be damaging to your legs and your health.  Your compression should be put on first thing in the morning. Take the compression off at night only when instructed by your care provider to do so. Sometimes wearing compression 24 hours a day will be recommended.       If you are having difficulty wearing your compression it is important to notify your care provider so that other options may be reviewed.    Thank you for choosing [facility]. Please call us if you have any questions [phone].

## 2021-06-25 NOTE — PROGRESS NOTES
Progress Notes by Juan Daniel Murphy RN at 1/19/2017 10:00 AM     Author: Juan Daniel Murphy RN Service: -- Author Type: Registered Nurse    Filed: 1/19/2017 10:45 AM Encounter Date: 1/19/2017 Status: Signed    : Juan Daniel Murphy RN (Registered Nurse)           1/19/17 4 layer change. Tolerating wraps well. Denies pain. Come in with plastic bags on feet so shoes are water proof.  Nurse Visit      Date of Service:1/19/2017    Chief Complaint: Patient presents to clinic for evaluation of their and swelling    Dressing on Arrival 4 layers bilateral and abd pads      Allergies: Latex and Penicillins    Assessment:    Visit Vitals   ? /70   ? Pulse 80   ? Temp 98.9  F (37.2  C) (Oral)   ? Resp 16       General:  Patient presents to clinic in no apparent distress.  Psychiatric:  Alert and oriented x3.   Lower extremity:  edema is and is not present.    Integumentary:  Skin is uniformly warm, dry and pink.    Wound size:   Wound 10/25/16  L lateral inferior calf (Active)   Pre Size Length 0.5 1/19/2017 10:00 AM   Pre Size Width 0.5 1/19/2017 10:00 AM   Pre Total Sq cm 0.25 1/19/2017 10:00 AM   Undermined na 10/25/2016  2:00 PM   Tunneling na 10/25/2016  2:00 PM   Description red wound bed 10/25/2016  2:00 PM   Prodcut Used ABD Pad 1/19/2017 10:00 AM       Wound 01/10/17 left shin (Active)   Pre Size Length 0.8 1/19/2017 10:00 AM   Pre Size Width 0.8 1/19/2017 10:00 AM   Pre Total Sq cm 0.64 1/19/2017 10:00 AM   Undermined N 1/17/2017  9:00 AM   Tunneling N 1/17/2017  9:00 AM   Prodcut Used ABD Pad 1/19/2017 10:00 AM      Undermining none.    The periwoundskin is WNL, denudement, erythema, induration, maceration and or warmth . Some pink areas. Legs washed and lotion applied. Wounds washed with saline    Plan:         1. Reapplied 4 layer         2.  Wound treatment:adaptic/abd pads/4 layer bilaterally         3.  Patient will follow up in 7 days for reevaluation with NP.

## 2021-06-25 NOTE — PROGRESS NOTES
Progress Notes by Indira Freed LPN at 8/29/2017 12:40 PM     Author: Indira Freed LPN Service: -- Author Type: Licensed Nurse    Filed: 8/29/2017  1:20 PM Encounter Date: 8/29/2017 Status: Signed    : Indira Freed LPN (Licensed Nurse)                     Nurse Visit    Date of Service:8/29/2017    Chief Complaint: Patient presents to clinic for evaluation of their ulcer and and swelling    Dressing on Arrival: 4 layer that was cut down about 6 inches because patient said his legs were to itchy. Instructed what to do if this happens again.    Allergies: Latex and Penicillins    Assessment:    /74  Pulse 84  Temp 97.4  F (36.3  C) (Oral)   Resp 16    General:  Patient presents to clinic in no apparent distress.  Psychiatric:  Alert and oriented x3.   Lower extremity:  edema is present.    Integumentary:  Skin is uniformly warm, dry and pink.    Wound size:   Wound 08/10/17 Left medial malleolus (Active)   Pre Size Length 1 8/25/2017  9:00 AM   Pre Size Width 2.3 8/25/2017  9:00 AM   Pre Total Sq cm 2.3 8/25/2017  9:00 AM   Post Size Length 2 8/25/2017  9:00 AM   Post Size Width 0.6 8/25/2017  9:00 AM       Wound 08/10/17 Tip of right great toe (Active)   Pre Size Length 0 8/25/2017  9:00 AM   Pre Size Width 0 8/25/2017  9:00 AM   Pre Size Depth 0 8/25/2017  9:00 AM   Pre Total Sq cm 0 8/25/2017  9:00 AM   Post Size Length 0.3 8/10/2017 10:00 AM   Post Size Width 0.5 8/10/2017 10:00 AM     Vasc Edema 2/22/2017 3/1/2017 3/17/2017 3/30/2017 8/29/2017   Right just above MTP 22 23 23.4 - 23.8   Right Ankle 24.5 24 24 - 24.5   Right Widest Calf 34 36 35.8 - 39.4   Right Thigh Up 10cm - - - - -   Left - just above MTP 22 22 23.5 24 24   Left Ankle 21 20.5 24 22.5 23.2   Left Widest Calf 33 32 33.2 34 38.5   Left Thigh Up 10cm - - - - -         Undermining is not present.    The periwoundskin is pink      Plan:         1. Patient will Follow up Friday 9/1/17         2. Treatment provided:  wound care order per Miriam Quinones's last note.     Cleansed with: normal saline  Protected skin with: Remedy skin lotion to intact skin  Applied: silvercel  Packed/filled with: NA  Covered with: ABD pads  Secured with: 4 layer bilaterally

## 2021-06-25 NOTE — PROGRESS NOTES
Progress Notes by Miriam Quinones CNP at 3/1/2017 10:20 AM     Author: Miriam Quinones CNP Service: -- Author Type: Nurse Practitioner    Filed: 3/1/2017  1:37 PM Encounter Date: 3/1/2017 Status: Signed    : Miriam Quinones CNP (Nurse Practitioner)       Date of Service:3/1/2017    Chief Complaint:   Chief Complaint   Patient presents with   ? Follow-up       History:    Ever presents to clinic for reevaluation of his ankle ulcer.  He has left the 2-layer wrap for the past week.  He has used Sarna and Cavilon cream on his right lower leg.  He has developed a rash on his right lower leg, which is not itchy.  He is unable to associated the development of the rash and the use of one of these creams.  He has picked up some of his medication, but is not taking them all or consistently.  He knows he needs to see his primary provider, but he has not made an appointment at this time.      Current Outpatient Prescriptions   Medication Sig Dispense Refill   ? diphenhydrAMINE (BENADRYL) 25 mg tablet Take 1 tablet (25 mg total) by mouth every 6 (six) hours as needed for itching. 30 tablet 0   ? furosemide (LASIX) 20 MG tablet Take 20 mg by mouth daily.     ? glipiZIDE (GLUCOTROL) 5 MG tablet Take 5 mg by mouth daily.     ? hydrocortisone 1 % cream      ? lisinopril (PRINIVIL,ZESTRIL) 40 MG tablet Take 40 mg by mouth daily.     ? metFORMIN (GLUCOPHAGE) 500 MG tablet Take 500 mg by mouth 2 (two) times a day with meals. Take one tablet by mouth every morning and 2 tablets by mouth every night at bedtime.     ? metoprolol succinate (TOPROL XL) 50 MG 24 hr tablet Take 1 tablet (50 mg total) by mouth daily. 90 tablet 3   ? omeprazole (PRILOSEC) 20 MG capsule Take 20 mg by mouth daily.     ? predniSONE (DELTASONE) 10 MG tablet Take 10 mg by mouth 4 (four) times a day.     ? simvastatin (ZOCOR) 40 MG tablet Take 40 mg by mouth bedtime.     ? sotalol (BETAPACE) 80 MG tablet Take 80 mg by mouth 2 (two) times a  day.     ? triamcinolone (KENALOG) 0.1 % cream Apply to your lower legs daily for two weeks 80 g 1   ? WAL-DRYL ALLERGY 25 mg capsule      ? warfarin (COUMADIN) 5 MG tablet Take 5 mg by mouth daily. Adjust dose based on INR results as directed.       Current Facility-Administered Medications   Medication Dose Route Frequency Provider Last Rate Last Dose   ? lidocaine 2 % jelly (XYLOCAINE)   Topical PRN Miriam Quinones CNP   1 application at 03/01/17 1029       Allergies   Allergen Reactions   ? Latex    ? Penicillins        Physical Exam:    Vitals:    03/01/17 1029   BP: 122/74   Pulse: 74   Resp: 16   Temp: 98.3  F (36.8  C)    Body mass index is 29.71 kg/(m^2).    General:  71 y.o. male in no apparent distress.    Psychiatric:  Alert and oriented x 3.  Cooperative.   Integumentary:  Skin is uniformly warm, dry and pink.  Lower extremity edema: none  Ulcerations:  There is no erika wound erythema, induration, maceration, denudement, fluctuance or warmth surrounding the ulcer(s).  He has a patch of flat, dry rash that is confine to his right leg.  He also has a rash on his face.      Wound 10/25/16  L lateral inferior calf (Active)   Pre Size Length 0.5 2/2/2017  2:00 PM   Pre Size Width 0.5 2/2/2017  2:00 PM   Pre Total Sq cm 0.03 2/2/2017  2:00 PM   Undermined na 10/25/2016  2:00 PM   Tunneling na 10/25/2016  2:00 PM   Description scar 2/22/2017 10:00 AM   Product Used ABD Pad 1/19/2017 10:00 AM       Wound 01/10/17 left shin (Active)   Pre Size Length 0.3 2/2/2017  2:00 PM   Pre Size Width 0.5 2/2/2017  2:00 PM   Pre Total Sq cm 0.02 2/2/2017  2:00 PM   Undermined N 1/17/2017  9:00 AM   Tunneling N 1/17/2017  9:00 AM   Description scar 2/22/2017 10:00 AM   Product Used ABD Pad 1/19/2017 10:00 AM       Wound 02/22/17 left medial foot (Active)   Pre Size Length 0.1 3/1/2017 10:00 AM   Pre Size Width 1 3/1/2017 10:00 AM   Pre Total Sq cm 0.1 3/1/2017 10:00 AM   Post Size Length 0.2 3/1/2017 10:00 AM   Post Size  Width 0.8 3/1/2017 10:00 AM             Assessment:  1. Leg ulcer, left, limited to breakdown of skin     2. Dermatitis  triamcinolone (KENALOG) 0.1 % cream   3. Idiopathic peripheral neuropathy     4. Diabetes         A new wound was identified today: no.    Plan:  1.   Debridement of the left foot ulcer was recommended.  After consent was obtained and topical 2% Xylocaine was applied under clean conditions, and using a #15 blade,the devitalized dermal tissue was debrided for a total square centimeters of 0.1. Following debridement, there was a decrease in the nonviable tissue. The patient tolerated the procedure without any difficulty.     2.   Right foot, improving slightly    3.  Contact dermatitis.  Suspect that he is allergic to either Sarna or Cavilon.  He will discontinue both of those products and start using TMC on his right lower leg.     4.  Medication compliance, poor.  Emphasized the importance of taking his medication and following up with his primary provider  5.   Treatment:   Tegaderm Ag Mesh will be moistened and applied into the open are.  It will be covered with an ABD and a 2-layer wrap will be applied.  Also, see above    6.   Patient will follow up with me a nurse visit next week and me in two weeks  for reevaluation.      Miriam Quinones, APRN, CNP,  [facility]  [phone]

## 2021-06-25 NOTE — PROGRESS NOTES
Progress Notes by Erika Patricia RN at 10/11/2017 10:40 AM     Author: Erika Patricia RN Service: -- Author Type: Registered Nurse    Filed: 10/11/2017 11:36 AM Encounter Date: 10/11/2017 Status: Signed    : Erika Patricia RN (Registered Nurse)                   Nurse Visit    Date of Service:10/11/2017    Chief Complaint: Patient presents to clinic for evaluation of their ulcer and and swelling    Dressing on Arrival 2 layer on right and foam adhesive on right toe wounds      Allergies: Latex and Penicillins    Assessment:    /70  Pulse 84  Temp 97.7  F (36.5  C) (Oral)   Resp 16    General:  Patient presents to clinic in no apparent distress.  Psychiatric:  Alert and oriented x3.   Lower extremity:  edema is present.    Integumentary:  Skin is uniformly warm, dry and pink.    Wound size:   Wound 08/10/17 Left medial malleolus (Active)   Pre Size Length 0.5 10/4/2017 11:00 AM   Pre Size Width 1.3 10/4/2017 11:00 AM   Pre Total Sq cm 0.65 10/4/2017 11:00 AM   Post Size Length 0 10/4/2017 11:00 AM   Post Size Width 0 10/4/2017 11:00 AM   Description scab 10/11/2017 10:00 AM   Prodcut Used Foam 10/4/2017 11:00 AM       Wound 10/04/17 Right anterior 2nd toe  (Active)   Pre Size Length 0.8 10/11/2017 10:00 AM   Pre Size Width 0.9 10/11/2017 10:00 AM   Pre Total Sq cm 0.72 10/11/2017 10:00 AM   Prodcut Used Foam 10/4/2017 11:00 AM      Undermining is not present.    The periwoundskin is WNL      Plan:         1. Patient will Follow up 10/16/17         2. Treatment provided: Patient arrives today for transition to velcro wraps if leg swelling measures down. Right leg measures are down (see flowsheet) so patient will transition to velcro wraps per Miriam Early's last orders. Patient arrived with no compression on left leg and stated he doesn't put it on before appointment because he just has to take it off when he gets here. Patient was instructed to put velcro on at home and applied tubigrip today. Left  leg measures up slightly, but patient states he has not had compression on it. Educated patient on the importance of compression. Patient is understanding of plan and will follow up on Monday with Miriam Early CNP.     Cleansed with: Remedy cleansing body lotion  Protected skin with: 3M NoSting  Applied: Tegaderm foam adhesive to toes and adaptic touch to left leg  Packed/filled with: NA  Covered with: gauze  Secured with: roll gauze

## 2021-06-25 NOTE — PROGRESS NOTES
Progress Notes by Juan Daniel Murphy RN at 9/21/2017  9:10 AM     Author: Juan Daniel Murphy RN Service: -- Author Type: Registered Nurse    Filed: 9/21/2017 12:17 PM Encounter Date: 9/21/2017 Status: Signed    : Juan Daniel Murphy RN (Registered Nurse)               9/21/17 bilateral leg swelling and left foot wound almost healed. Left leg swelling improved with 4 layer. Arrives with no compression on right leg. Applied compression sock and explained will get open wounds if it gets much more swollen.  Nurse Visit      Date of Service:9/21/2017    Chief Complaint: Patient presents to clinic for evaluation of their ulcer and and swelling    Dressing on Arrival 4 layer on left/ right nothing      Allergies: Latex and Penicillins    Assessment:    /60  Pulse 80  Temp 98.9  F (37.2  C) (Oral)   Resp 14    General:  Patient presents to clinic in no apparent distress.  Psychiatric:  Alert and oriented x3.   Lower extremity:  edema is present.    Integumentary:  Skin is uniformly warm, dry and pink.    Wound size:   Wound 08/10/17 Left medial malleolus (Active)   Pre Size Length 0.2 9/21/2017  9:00 AM   Pre Size Width 0.4 9/21/2017  9:00 AM   Pre Total Sq cm 0.8 9/21/2017  9:00 AM   Post Size Length 0.3 9/1/2017  9:00 AM   Post Size Width 0.7 9/1/2017  9:00 AM   Prodcut Used ABD Pad 9/21/2017  9:00 AM      Undermining none.    The periwoundskin is pink not warm      Plan:         1. Patient will Follow up 1 week NP         2. Treatment provided:     Cleansed with: normal saline and Clinical Care/Carrklenz  Protected skin with: cavolon 3 M lotionpatient brought in  Applied: ADB pads  Packed/filled with: oil emulsion  Covered with: 4-layer Profore bandage  Secured with: tape

## 2021-06-25 NOTE — PROGRESS NOTES
Progress Notes by Marcy Smiley RN at 3/8/2017 10:30 AM     Author: Marcy Smiley RN Service: -- Author Type: Registered Nurse    Filed: 3/8/2017  4:09 PM Encounter Date: 3/8/2017 Status: Signed    : Marcy Smiley RN (Registered Nurse)       Nurse Visit      Date of Service:3/8/2017    Chief Complaint: Patient presents to clinic for evaluation of their ulcer and and swelling    Dressing on Arrival 2 layer      Allergies: Latex and Penicillins    Assessment:    Visit Vitals   ? /68   ? Pulse 64   ? Temp 98.3  F (36.8  C) (Oral)   ? Resp 16       General:  Patient presents to clinic in no apparent distress.  Psychiatric:  Alert and oriented x3.   Lower extremity:  edema is not present.    Integumentary:  Skin is uniformly warm, dry and pink.    Wound size:   Wound 10/25/16  L lateral inferior calf (Active)   Pre Size Length 0.5 2/2/2017  2:00 PM   Pre Size Width 0.5 2/2/2017  2:00 PM   Pre Total Sq cm 0.03 2/2/2017  2:00 PM   Undermined na 10/25/2016  2:00 PM   Tunneling na 10/25/2016  2:00 PM   Description scar 2/22/2017 10:00 AM   Prodcut Used ABD Pad 1/19/2017 10:00 AM       Wound 01/10/17 left shin (Active)   Pre Size Length 0.3 2/2/2017  2:00 PM   Pre Size Width 0.5 2/2/2017  2:00 PM   Pre Total Sq cm 0.02 2/2/2017  2:00 PM   Undermined N 1/17/2017  9:00 AM   Tunneling N 1/17/2017  9:00 AM   Description scar 2/22/2017 10:00 AM   Prodcut Used ABD Pad 1/19/2017 10:00 AM       Wound 02/22/17 left medial foot (Active)   Pre Size Length 0.2 3/8/2017 10:00 AM   Pre Size Width 0.7 3/8/2017 10:00 AM   Pre Total Sq cm 0.21 3/8/2017 10:00 AM   Post Size Length 0.2 3/1/2017 10:00 AM   Post Size Width 0.8 3/1/2017 10:00 AM      Undermining is not present.    The periwoundskin is WNL      Plan:         1. Patient will Follow up 1 week          2. Treatment provided:  Patients dressing was removed, leg cleansed, leg moisturized, and new dressings applied per last orders.  Patient does not have any signs  or symptoms of infection and tolerated the dressing change well.     Cleansed with: normal saline  Protected skin with: remedy skin repair cream  Applied: Tegaderm Ag mesh  Packed/filled with: tegaderm Ag mesh  Covered with: ABD pads  Secured with: Coban 2 layer           Left medial ankle

## 2021-06-25 NOTE — PROGRESS NOTES
Progress Notes by Juan Daniel Murphy RN at 9/7/2017  8:40 AM     Author: Juan Daniel Murphy RN Service: -- Author Type: Registered Nurse    Filed: 9/7/2017 10:19 AM Encounter Date: 9/7/2017 Status: Signed    : Juan Daniel Murphy RN (Registered Nurse)                         9/6/17 bilateral leg swelling/weeping/itching/rash. Brown/serous drainage noted on greta of both legs. Legs red/pink. They are not warm to touch. [place] tubigrip on right leg and covered weeping area with paper towel. Patient rolled left leg 4 layer down due to drainage and itching/covered with paper towel/tried to pull it back up with a pliers. Arrives with 4 layer 4 inches down on right leg. Sister present at visit. Miriam Quinones did come into room and gave new orders.  Nurse Visit      Date of Service:9/7/2017    Chief Complaint: Patient presents to clinic for evaluation of their ulcer and and swelling    Dressing on Arrival see above note      Allergies: Latex and Penicillins    Assessment:    /60  Pulse 76  Temp 98.3  F (36.8  C) (Oral)   Resp 14    General:  Patient presents to clinic in no apparent distress.  Psychiatric:  Alert and oriented x3.   Lower extremity:  edema is present.    Integumentary:  Skin is uniformly warm, dry and pink.    Wound size:   Wound 08/10/17 Left medial malleolus (Active)   Pre Size Length 0.4 9/7/2017  9:00 AM   Pre Size Width 1 9/7/2017  9:00 AM   Pre Total Sq cm 0.4 9/7/2017  9:00 AM   Post Size Length 0.3 9/1/2017  9:00 AM   Post Size Width 0.7 9/1/2017  9:00 AM   Prodcut Used Alginate 9/7/2017  9:00 AM      Undermining none.    The periwoundskin is maceration /pink/red no warmth or fever    Plan:         1. Patient will Follow up Monday nurse visit/ Thursday NP         2. Treatment provided:     Cleansed with: Clinical Care/Carrklenz/saline  Protected skin with: TMC to ra intact skin  Applied: ADB pads  Packed/filled with: calcium alginate  Covered with:  4-layer Profore bandage  Secured with: tape

## 2021-06-25 NOTE — PROGRESS NOTES
Progress Notes by Marcy Smiley RN at 9/12/2017  2:30 PM     Author: Marcy Smiley RN Service: -- Author Type: Registered Nurse    Filed: 9/12/2017  4:20 PM Encounter Date: 9/12/2017 Status: Signed    : Marcy Smiley RN (Registered Nurse)       Nurse Visit      Date of Service:9/12/2017    Chief Complaint: Patient presents to clinic for evaluation of their ulcer and and swelling    Dressing on Arrival 4 layer      Allergies: Latex and Penicillins    Assessment:    /62  Pulse 88  Temp 97.5  F (36.4  C) (Oral)   Resp 14    General:  Patient presents to clinic in no apparent distress.  Psychiatric:  Alert and oriented x3.   Lower extremity:  edema is present.    Integumentary:  Skin is uniformly warm, dry and pink.    Wound size:   Wound 08/10/17 Left medial malleolus (Active)   Pre Size Length 0.5 9/12/2017  2:00 PM   Pre Size Width 1.4 9/12/2017  2:00 PM   Pre Total Sq cm 0.7 9/12/2017  2:00 PM   Post Size Length 0.3 9/1/2017  9:00 AM   Post Size Width 0.7 9/1/2017  9:00 AM   Prodcut Used Alginate 9/7/2017  9:00 AM      Undermining is not present.    The periwoundskin is WNL      Plan:         1. Patient will Follow up 3 days         2. Treatment provided:  Patient arrives today for dressing change of his bilateral lower extremities due to his venous stasis and left leg ulcer.  Patient arrives today with dressing on and intact and saturated with water from his shower.  Patient states that He had to wash his toes before he came in to clinic today and that on every other day when he showers he uses his cast covers for his shower.  Patient states the wraps have been kept dry the rest of the week.  I encouraged the patient to continue to keep his new wraps dry over the next few days until his next appointment.  Patients dressings were removed, legs cleansed, legs treated with TMC cream, and new dressings applied per last orders.  Patient tolerated dressing change well and reports and has  notable symptoms of a cold.  Patient reports that he just saw his primary care physician whom did give him an antibiotic for this yesterday.  Patient will follow up later this week to see Miriam Quinones CNP.     Cleansed with: normal saline  Protected skin with: TMC ointment(patient provided)  Applied: calcium alginate  Packed/filled with: none  Covered with: 4-layer bandage and ABD pads  Secured with: none        Left medial leg      Bilateral lower extremity

## 2021-06-25 NOTE — TELEPHONE ENCOUNTER
Patient's daughter called in with concerns about the fact that they were told [facility] was in [facility]. She was wondering if he needs to be seen sooner than 4/4 due to AF. Reviewed with her that PAF is not a new diagnosis, and that he is on anticoagulation.     She denies that he is having and palpitations, unusual shortness of breath, fluid retention, or lightheadedness. We discussed to call with s/s change, otherwise waiting until 4/4/19 should not be a problem with intermittent AF if asymptomatic. She verbalized understanding.    Raven Torres RN

## 2021-06-25 NOTE — PROGRESS NOTES
Progress Notes by Miriam Quinones CNP at 1/10/2017 11:00 AM     Author: Miriam Quinones CNP Service: -- Author Type: Nurse Practitioner    Filed: 1/10/2017 12:38 PM Encounter Date: 1/10/2017 Status: Signed    : Miriam Quinones CNP (Nurse Practitioner)       Date of Service:1/10/2017    Chief Complaint:   Chief Complaint   Patient presents with   ? Follow-up     left leg edema with ulcers       History:    Ever presents to clinic for reevaluation of his left leg.  He had a 2-layer wrap that slipped some, but did not notify our office to have it changed. Additionally, above the wrap his leg was very itchy. As a result, he scratched an opening.  He is offering no other complaints.    Current Outpatient Prescriptions   Medication Sig Dispense Refill   ? furosemide (LASIX) 20 MG tablet Take 20 mg by mouth daily.     ? glipiZIDE (GLUCOTROL) 5 MG tablet Take 5 mg by mouth daily.     ? lisinopril (PRINIVIL,ZESTRIL) 40 MG tablet Take 40 mg by mouth daily.     ? metFORMIN (GLUCOPHAGE) 500 MG tablet Take 500 mg by mouth 2 (two) times a day with meals. Take one tablet by mouth every morning and 2 tablets by mouth every night at bedtime.     ? metoprolol succinate (TOPROL XL) 50 MG 24 hr tablet Take 1 tablet (50 mg total) by mouth daily. 90 tablet 3   ? omeprazole (PRILOSEC) 20 MG capsule Take 20 mg by mouth daily.     ? simvastatin (ZOCOR) 40 MG tablet Take 40 mg by mouth bedtime.     ? sotalol (BETAPACE) 80 MG tablet Take 80 mg by mouth 2 (two) times a day.     ? warfarin (COUMADIN) 5 MG tablet Take 5 mg by mouth daily. Adjust dose based on INR results as directed.       Current Facility-Administered Medications   Medication Dose Route Frequency Provider Last Rate Last Dose   ? lidocaine 2 % jelly (XYLOCAINE)   Topical PRN Miriam Quinones CNP   1 application at 12/16/16 1045       Allergies   Allergen Reactions   ? Latex    ? Penicillins        Physical Exam:    Vitals:    01/10/17 1122   BP:  130/78   Pulse: 92   Resp: 18   Temp: 98.1  F (36.7  C)    There is no height or weight on file to calculate BMI.    General:  71 y.o. male in no apparent distress.    Psychiatric:  Alert and oriented x 3.  Cooperative.   Integumentary:  Skin is uniformly warm, dry and pink.  Lower extremity edema: none  Ulcerations:  There is no erika wound erythema, induration, maceration, denudement, fluctuance or warmth surrounding the ulcer(s).           Wound 01/10/17 left shin (Active)   Pre Size Length 0.8 1/10/2017 11:00 AM   Pre Size Width 0.9 1/10/2017 11:00 AM   Pre Total Sq cm 0.72 1/10/2017 11:00 AM             Assessment:  1. Leg ulcer, left, with fat layer exposed     2. Idiopathic peripheral neuropathy     3. Dermatitis     4. Venous hypertension, chronic, bilateral         A new wound was identified today: yes,  it is located left lateral.    Plan:  1.   Excisional Debridement of the left lateral (shin) ulcer was recommended and after consent was obtained and topical 2% Xylocaine was applied, under clean conditions, using a #15 scalpel, the devitalized subcutaneous  tissue in the ulcer base and at the ulcer margins were sharply excised for a total sq. cm of 0.72    Following excision, there was minimal bleeding tissue, which was easily controlled and a decrease in the nonviable tissue.  The patient tolerated the procedure without difficulty.     2.    Left leg ulcer with healing complicated by peripheral neuropathy and Venous insufficiency.  No signs of infection.    3.   Dermatitis, persists.  Recommended Sarna.     4.   Treatment:   Silvercel will be applied and covered with Mepilex border.  A 2-layer wrap was applied.    5.    Patient will follow up with me in one week  for reevaluation.      Miriam Quinones, APRN, CNP,  [facility]  [phone]

## 2021-06-25 NOTE — PROGRESS NOTES
Progress Notes by Marcy Smiley RN at 3/30/2017  8:30 AM     Author: Marcy Smiley RN Service: -- Author Type: Registered Nurse    Filed: 3/30/2017  9:08 AM Encounter Date: 3/30/2017 Status: Signed    : Marcy Smiley RN (Registered Nurse)       Nurse Visit      Date of Service:3/30/2017    Chief Complaint: Patient presents to clinic for evaluation of their ulcer and and swelling    Dressing on Arrival 2 layer      Allergies: Latex and Penicillins    Assessment:    /64  Pulse 68  Temp 98.5  F (36.9  C) (Oral)   Resp 18    General:  Patient presents to clinic in no apparent distress.  Psychiatric:  Alert and oriented x3.   Lower extremity:  edema is not present.    Integumentary:  Skin is uniformly warm, dry and pink.    Wound size:   Wound 02/22/17 left medial foot (Active)   Pre Size Length 0.1 3/17/2017  9:00 AM   Pre Size Width 0.2 3/17/2017  9:00 AM   Pre Total Sq cm 0.2 3/17/2017  9:00 AM   Post Size Length 0.2 3/1/2017 10:00 AM   Post Size Width 0.8 3/1/2017 10:00 AM       Wound 03/17/17 L ANTERIOR LEG (Active)   Pre Size Length 0 3/24/2017 10:00 AM   Pre Size Width 0 3/24/2017 10:00 AM   Pre Total Sq cm 0 3/24/2017 10:00 AM   Description multiple excoriations 3/24/2017 10:00 AM      Undermining is not present.    The periwoundskin is WNL      Plan:         1. Patient will Follow up 1 month         2. Treatment provided:  Patients dressing was removed, leg cleansed, leg moisturized and compression applied.  Patient does not have any open sores and was educated on compliance with compression.  Patient is understanding and in agreement with the plan.  Patient was advised to call if swelling gets worse or if he develops new sores.  Miriam Quinones CNP was informed.     Cleansed with: normal saline  Protected skin with: remedy skin repair cream  Applied: none  Packed/filled with: none  Covered with: tubi -size F, non latex  Secured with: compreflex lite velcro compression(patient  brought in)

## 2021-06-26 NOTE — PROGRESS NOTES
Progress Notes by Brian Moe MD at 6/13/2018  9:50 AM     Author: Brian Moe MD Service: -- Author Type: Physician    Filed: 6/13/2018 10:28 AM Encounter Date: 6/13/2018 Status: Signed    : Brian Moe MD (Physician)           Click to link to [facility]     [facility] Clinic Note      Assessment:    1. Coronary artery disease due to lipid rich plaque     2. Heart failure with preserved ejection fraction (H)     3. Dyslipidemia, goal LDL below 70         Plan:    1. He will return to the heart failure clinic later this summer as directed by Noa.  2. Return to see Dr. Moe in 1 year.    An After Visit Summary was printed and given to the patient.    Subjective:    Ever Crane returned for his first visit with me in 2 years.    Ever outlined his recent medical history including partial foot amputation last fall.  He says he has been at [facility] since that time but will be moving to a new facility in [place].  Ever reports that he has his vehicle and is back to doing plumbing work.  He does not report any cardiac symptoms at this time.  He reports no difficulties in taking his medications.    Past Medical History:    Patient Active Problem List   Diagnosis   ? Dyslipidemia, goal LDL below 70   ? Persistent atrial fibrillation (H)   ? Atrial flutter (H)   ? Venous hypertension, chronic, bilateral   ? Polyneuropathy associated with underlying disease (H)   ? Type 2 diabetes mellitus, without long-term current use of insulin (H)   ? Acquired lymphedema of lower extremity   ? Coronary artery disease due to lipid rich plaque   ? PAD (peripheral artery disease) (H)   ? Heart failure with preserved ejection fraction (H)   ? Essential hypertension       Past Medical History:   Diagnosis Date   ? Atrial flutter (H)    ? Candidiasis of perineum 1/3/2018   ? Coronary artery disease due to lipid rich plaque 2000    CABG x2   ? Diabetic ulcer of both feet (H) 10/31/2017   ?  Dyslexia    ? Dyslipidemia, goal LDL below 70 2000   ? Essential hypertension    ? Gangrene of left foot (H)    ? Neuropathy (H)    ? Paroxysmal Atrial Fibrillation     Brian Moe: 8/2011 Cardioversion; CHADS2 VASC = 5; he is on warfarin and sotalol    ? Type 2 diabetes mellitus, without long-term current use of insulin (H)    ? Unable to function independently 11/13/2017       Past Surgical History:   Procedure Laterality Date   ? CARDIOVERSION  8/25/2011   ? CORONARY ARTERY BYPASS GRAFT  3/6/2000    SVG to OM1, SVG to PDA   ? FOOT AMPUTATION Left 11/5/2017    Procedure: LEFT TRANSMETATARSAL AMPUTATION;  Surgeon: Ever Wick MD;  Location: [facility];  Service:    ? INGUINAL HERNIA REPAIR Bilateral 1969 and 1979        reports that he quit smoking about 18 years ago. His smoking use included Cigarettes. He has never used smokeless tobacco. He reports that he drinks alcohol. He reports that he does not use illicit drugs.    Family History   Problem Relation Age of Onset   ? Sudden death Mother 85   ? CABG Father    ? Valvular heart disease Father    ? Esophageal cancer Father 58     cause of death   ? No Medical Problems Son    ? No Medical Problems Grandchild    ? No Medical Problems Grandchild        Outpatient Encounter Prescriptions as of 6/13/2018   Medication Sig Dispense Refill   ? acetaminophen (TYLENOL) 500 MG tablet Take 2 tablets (1,000 mg total) by mouth every 6 (six) hours as needed.  0   ? albuterol (PROVENTIL) 2.5 mg /3 mL (0.083 %) nebulizer solution Take 3 mL (2.5 mg total) by nebulization every 6 (six) hours as needed for wheezing or shortness of breath.  0   ? fluconazole (DIFLUCAN) 100 MG tablet Take 200 mg by mouth daily.      ? furosemide (LASIX) 40 MG tablet Take 0.5 tablets (20 mg total) by mouth daily as needed (Extra dose at noon eache day if weight gain> 2lbs in 24 hours).  0   ? glipiZIDE (GLUCOTROL) 5 MG tablet Take 10 mg by mouth 2 (two) times a day before meals.      ?  lisinopril (PRINIVIL,ZESTRIL) 10 MG tablet Take 10 mg by mouth daily.     ? loperamide (IMODIUM) 2 mg capsule Take 1 capsule (2 mg total) by mouth 3 (three) times a day as needed for diarrhea.  0   ? metoprolol succinate (TOPROL-XL) 50 MG 24 hr tablet Take 50 mg by mouth 2 (two) times a day.      ? nitroglycerin (NITROSTAT) 0.4 MG SL tablet Place 0.4 mg under the tongue every 5 (five) minutes as needed for chest pain.      ? omeprazole (PRILOSEC) 20 MG capsule Take 20 mg by mouth daily.      ? spironolactone (ALDACTONE) 25 MG tablet Take 1 tablet (25 mg total) by mouth daily.  0   ? triamcinolone (KENALOG) 0.1 % cream Apply to rash on the legs daily until rash is gone, but not longer than 2 weeks. 30 g 1   ? dextromethorphan-guaiFENesin (ROBITUSSIN-DM)  mg/5 mL liquid Take 10 mL by mouth every 4 (four) hours as needed (cough).     ? furosemide (LASIX) 40 MG tablet Take 1 tablet (40 mg total) by mouth daily.  0   ? gentamicin (GARAMYCIN) 0.1 % ointment Apply as instructed to ulcer daily 30 g 1   ? Eusebio Therapuetic Nutrition 7-7-1.5 gram PwPk Take 1 packet by mouth daily.     ? multivitamin with minerals (THERA-M) 9 mg iron-400 mcg Tab tablet Take 1 tablet by mouth daily.     ? mupirocin (BACTROBAN) 2 % ointment Apply as instructed to ulcer daily 30 g 1   ? simvastatin (ZOCOR) 40 MG tablet Take 40 mg by mouth at bedtime.      ? urea-alpha hydroxy acids (ATRAC-TAIN, WITH AHA,) 10-4 % Crea Apply 1 application topically daily. Apply after washing legs & feet with water     ? WARFARIN SODIUM (WARFARIN ORAL) Take by mouth. 3/26/18 INR 2.9 Cont 3.5mg qd. Next INR 4/2.  3/21/18 INR 3.1 Cont 3.5mg qd. Next INR 3/26.  3/15/18 INR 1.6 3.5mg qd. Next INR 3/21.       Facility-Administered Encounter Medications as of 6/13/2018   Medication Dose Route Frequency Provider Last Rate Last Dose   ? lidocaine 2 % jelly (XYLOCAINE)   Topical PRN Miriam Quinones, CNP   1 application at 02/19/18 8360       Review of Systems:  "    General: WNL  Eyes: WNL  Ears/Nose/Throat: WNL  Lungs: WNL  Heart: WNL  Stomach: WNL  Bladder: WNL  Muscle/Joints: WNL  Skin: WNL  Nervous System: WNL  Mental Health: WNL     Blood: WNL    Objective:     /68 (Patient Site: Left Arm, Patient Position: Sitting, Cuff Size: Adult Regular)  Pulse 62  Resp 16  Ht 5' 10\" (1.778 m)  Wt 201 lb (91.2 kg)  BMI 28.84 kg/m2  Wt Readings from Last 5 Encounters:   06/13/18 201 lb (91.2 kg)   06/02/18 197 lb 12.8 oz (89.7 kg)   04/25/18 201 lb (91.2 kg)   04/02/18 204 lb 4.8 oz (92.7 kg)   02/23/18 194 lb (88 kg)        Physical Exam:    GENERAL APPEARANCE: alert, no apparent distress  EYES: no scleral icterus  NECK: no carotid bruits or adenopathy, jugular venous pressure is less than 5 cm  CHEST: symmetric, the lungs are clear to auscultation  CARDIOVASCULAR: regular rhythm without murmur or gallop  EXTREMITIES: no cyanosis, clubbing or edema  SKIN: no xanthelasma  NEUROLOGIC: normal gait and coordination  PSYCHIATRIC: mood and affect are normal    Cardiac Testing:    Echocardiogram:   Results for orders placed during the hospital encounter of 01/25/18   Echo Complete [ECH10] 01/25/2018    Narrative   Left ventricle ejection fraction is normal. The calculated left   ventricular ejection fraction is 55%.    The following segments are hypokinetic: basal inferolateral. All other   segments are normal.    Left Atrium: Left atrial volume is moderately increased.    Aortic Valve: The valve is tricuspid. The aortic valve is sclerotic   without reduced excursion. Mild aortic regurgitation.    Estimated central venous pressure equal to 13 mmHg.    The estimated systolic pulmonary artery pressure is 50 mmhg.    When compared to the previous study dated 11/3/2017, no significant   change.          Imaging:    No results found.    Lab Results:    Lab Results   Component Value Date    CREATININE 1.35 (H) 05/14/2018    BUN 40 (H) 04/20/2018     04/20/2018    K 4.6 " 04/20/2018     04/20/2018    CO2 23 04/20/2018     Lab Results   Component Value Date    CHOL 115 08/25/2017    TRIG 130 08/25/2017    HDL 26 (L) 08/25/2017    LDLDIRECT 54 07/30/2015     BNP (pg/mL)   Date Value   01/25/2018 492 (H)   01/04/2016 25   06/05/2013 77 (H)     Creatinine (mg/dL)   Date Value   05/14/2018 1.35 (H)   04/20/2018 1.37 (H)   03/12/2018 1.12   02/23/2018 1.63 (H)     Magnesium (mg/dL)   Date Value   11/04/2017 1.8   11/02/2017 2.2   11/01/2017 1.6 (L)     LDL Calculated (mg/dL)   Date Value   08/25/2017 63   04/26/2016 62   07/30/2015      Comment:     Invalid, Triglycerides >300     Lab Results   Component Value Date    WBC 7.3 04/20/2018    HGB 12.8 (L) 04/20/2018    HCT 40.1 04/20/2018    MCV 87 04/20/2018     04/20/2018           Much or all of the text in this note was generated through the use of the Dragon Dictate voice-to-text software. Errors in spelling or words which seem out of context are unintentional. Sound alike errors, in particular, may have escaped editing.

## 2021-06-26 NOTE — PROGRESS NOTES
Progress Notes by Johann Michaels at 10/9/2018  9:16 AM     Author: Johann Michaels Service: Pharmacy Author Type: Pharmacy Student    Filed: 10/9/2018 10:08 AM Date of Service: 10/9/2018  9:16 AM Status: Attested    : Johann Michaels Cosigner: Arabella Jacobs PharmD at 10/9/2018 10:09 AM    Attestation signed by Arabella Jacobs PharmD at 10/9/2018 10:09 AM    I have reviewed the warfarin dosing and note documented by the pharmacy student and agree with his assessment.  - Arabella Jacobs PharmD, BCPS 10/9/2018 10:09 AM                    Pharmacy Consult: Warfarin Management    Pharmacy consulted to dose warfarin for Ever Crane, a 73 y.o. male    Ordering provider: Margaux Fierro PA-C    Reason for warfarin therapy: Atrial Fibrillation  Goal INR Range: 2-3    Subjective  Medication lists (home and hospital) were reviewed.    New medications that may increase bleeding risk/INR: Heparin subQ, aspirin    Restarted home medications that may increase bleeding risk/INR: glipizide, omeprazole    Restarted home medications that may result in decreased warfarin effect: spironolactone    Home Warfarin Dosing: Warfarin 5 mg MWF; warfarin 4 mg Tues, Thurs, Sat and Sun    Other Anticoagulants: heparin SubQ  Patient being bridged: No    Patient Active Problem List   Diagnosis   ? Dyslipidemia, goal LDL below 70   ? Persistent atrial fibrillation (H)   ? Typical atrial flutter (H)   ? Venous hypertension, chronic, bilateral   ? Polyneuropathy associated with underlying disease (H)   ? Non-insulin dependent type 2 diabetes mellitus (H)   ? Acquired lymphedema of lower extremity   ? Coronary artery disease due to lipid rich plaque   ? PAD (peripheral artery disease) (H)   ? Heart failure with preserved ejection fraction, NYHA class II (H)   ? MESHA (acute kidney injury) (H)   ? Benign essential hypertension   ? Wound, open, foot, left, sequela   ? Medically noncompliant   ? CAP (community acquired  pneumonia)   ? Cellulitis   ? Acute conjunctivitis of left eye, unspecified acute conjunctivitis type   ? Allergic conjunctivitis, left   ? C. difficile colitis   ? Bilateral lower leg cellulitis   ? Non-STEMI (non-ST elevated myocardial infarction) (H)   ? Abnormal CT scan, chest   ? Carotid stenosis, asymptomatic, right   ? S/P CABG x 3    Past Medical History:   Diagnosis Date   ? Atrial flutter (H)    ? Candidiasis of perineum 1/3/2018   ? Coronary artery disease due to lipid rich plaque 2000    CABG x2   ? Diabetic ulcer of both feet (H) 10/31/2017   ? Dyslexia    ? Dyslipidemia, goal LDL below 70 2000   ? Essential hypertension    ? Gangrene of left foot (H)    ? Neuropathy (H)    ? Paroxysmal Atrial Fibrillation     Brian Moe: 8/2011 Cardioversion; CHADS2 VASC = 5; he is on warfarin and sotalol    ? Type 2 diabetes mellitus, without long-term current use of insulin (H)    ? Unable to function independently 11/13/2017        Social History   Substance Use Topics   ? Smoking status: Former Smoker     Packs/day: 1.00     Years: 36.00     Types: Cigarettes     Start date: 6/13/1964     Quit date: 4/30/2000   ? Smokeless tobacco: Never Used   ? Alcohol use 3.0 oz/week     2 Glasses of wine, 3 Cans of beer per week       Objective   Labs:  Last 3 days:    Recent Labs      10/07/18   0708  10/08/18   0356  10/09/18   0430   CREATININE  1.37*  1.45*  1.25   HGB  8.9*  8.2*  8.7*   HCT  29.4*  27.0*  27.8*   PLT  124*  117*  165     Last 7 days:   Recent labs: (last 7 days)      10/04/18   1413  10/04/18   1544  10/05/18   0504  10/08/18   1226  10/09/18   0430   INR  1.64*  1.45*  1.48*  1.21*  1.15*       Warfarin Dosing History:    Date INR Warfarin Dose Comment   10/8 1.21 5 mg    10/9 1.15 5 mg                  Assessment  The patient is continuing warfarin for the indication of Atrial Fibrillation with a goal INR of 2-3. INR today is subtherapeutic. Warfarin has been on hold since admission on 9/29, it was  resumed yesterday.    Plan  1. Administer warfarin 5 mg PO today.  2. Check INR daily or as appropriate.  3. Continue to follow the patient's INR, PLT, and HGB as available.  4. Monitor for potential drug/disease interactions.    Thank you for the consult,  Johann Michaels, Pharmacy Student 10/9/2018 9:16 AM

## 2021-06-26 NOTE — PROGRESS NOTES
Progress Notes by Nayla Acevedo CNP at 11/5/2018  7:50 AM     Author: Nayla Acevedo CNP Service: -- Author Type: Nurse Practitioner    Filed: 11/5/2018  9:37 AM Encounter Date: 11/5/2018 Status: Signed    : Nayla Acevedo CNP (Nurse Practitioner)          Click to link to [facility] HEART [facility] ELECTROPHYSIOLOGY NOTE      Assessment/Recommendations   Assessment/Plan:    Diagnoses and all orders for this visit:    Persistent atrial fibrillation (H) and Typical atrial flutter (H) and as far as I can tell he is asymptomatic with atrial arrhythmias.  He has not had any lightheadedness for several weeks that he is reporting.  He is accompanied by family.  I discussed rate versus rhythm control is decided on symptoms and since appears asymptomatic, to continue with rate control.  Therefore, I will increase metoprolol tartrate to 25 mg p.o. twice daily as on rate control for A. fib.   I discussed that maze and left atrial appendage ligation was not done interoperatively due to extensive scarring noted.  Questions answered.  Will follow with cardiology only.  -     metoprolol tartrate (LOPRESSOR) 25 MG tablet; Take 1 tablet (25 mg total) by mouth 2 (two) times a day.  Dispense: 60 tablet; Refill: 2  Benign essential hypertension and well-controlled.    Heart failure with preserved ejection fraction, NYHA class II (H) and chronic peripheral edema and on furosemide 40 mg p.o. daily.  Follows in wound clinic.    S/P CABG x 3 and significant improvement in that alert and oriented.  Appetite returned to normal and able to eat most normal foods.  Passed swallow study recently per his report.  Sleeping okay.  At transitional care and going back to independent living setting on Friday of this week.    [id number] score of 5 and on chronic warfarin.  Follow up in clinic with Dr. Moe in 4 months and will cancel appointment with Dr. Moe on November 30.     History of Present  Illness    . Ever Crane is a very pleasant 73 y.o. male who comes in today for EP consult after open heart surgery for severe coronary artery disease.  Ever Crane has a known history of hypertension, hyperlipidemia, acquired lymphedema and cellulitis of lower extremities, type 2 diabetes, peripheral vascular disease, typical atrial flutter, persistent atrial fibrillation and CAD.  Coronary angiogram demonstrated nonpatent grafts and severe coronary artery disease.  On 10/4/2018 Ever had repeat cab--- x3 and endoscopic vein harvest from left leg.  Maze and left atrial appendage ligation was not done intraoperatively due to severe scarring in left upper chamber.  He has a history of A. fib but no A. fib was seen during the postop period.  He was restarted on warfarin.  He is in PT and OT at TCU.  Doing well family reports orientation wise and physically.  He appears ready to discharge to independent living as planned later this week.  He continues to follow in the vascular/wound clinic.  He reports that he is unaware of when he is in A. fib.  He denies any chest discomfort.  He has shortness of breath with exertion such as walking several 100 feet but not new for him.      Cardiographics (personally reviewed):  Results for orders placed during the hospital encounter of 09/29/18   Echo Complete [ECH10] 09/30/2018    Narrative   1.Left ventricle ejection fraction is mildly decreased. The estimated   left ventricular ejection fraction is 45%.    2.More pronounced inferolateral hypokinesis noted.    3.TAPSE is abnormal, which is consistent with abnormal right ventricular   systolic function.    4.Mild regurgitant lesions of all 4 cardiac valves seen.    5.Mild pulmonary hypertension suggested.    6.When compared to the previous study dated 1/25/2018, overall left   ventricular ejection fraction is lower on current study, inferolateral   hypokinesis appears to be a larger distribution.      August 2018 coronary  angiogram shows:  Conclusion     Estimated blood loss was <20 ml.    Prox Cx to Mid Cx lesion 70% stenosed.    Dist Cx lesion 90% stenosed.    Ost 1st Mrg to 1st Mrg lesion 100% stenosed.    Dist LAD-1 lesion 50% stenosed.    Dist LAD-2 lesion 90% stenosed.    Mid LAD lesion 70% stenosed.    Pressure wire/FFR was performed on the lesion. pre diagnositic: 0.82. post diagnostic: 0.65.    Pressure wire/FFR was performed on the lesion.    Prox RCA to Dist RCA lesion 99% stenosed.    Dist Graft lesion 100% stenosed.    Origin to Dist Graft lesion 100% stenosed.           Results for orders placed or performed during the hospital encounter of 09/29/18   ECG 12 lead if not done in past 7 days   Result Value Ref Range    SYSTOLIC BLOOD PRESSURE  mmHg    DIASTOLIC BLOOD PRESSURE  mmHg    VENTRICULAR RATE 78 BPM    ATRIAL RATE 278 BPM    P-R INTERVAL  ms    QRS DURATION 92 ms    Q-T INTERVAL 388 ms    QTC CALCULATION (BEZET) 442 ms    P Axis -79 degrees    R AXIS 10 degrees    T AXIS 44 degrees    MUSE DIAGNOSIS       Atrial flutter with variable A-V block  Nonspecific ST and T wave abnormality  Abnormal ECG  When compared with ECG of 29-SEP-2018 00:12,  ST now depressed in Inferior leads  Nonspecific T wave abnormality now evident in Inferior leads            Problem List:  Patient Active Problem List   Diagnosis   ? Dyslipidemia, goal LDL below 70   ? Persistent atrial fibrillation (H)   ? Typical atrial flutter (H)   ? Venous hypertension, chronic, bilateral   ? Polyneuropathy associated with underlying disease (H)   ? Non-insulin dependent type 2 diabetes mellitus (H)   ? Acquired lymphedema of lower extremity   ? Coronary artery disease due to lipid rich plaque   ? PAD (peripheral artery disease) (H)   ? Heart failure with preserved ejection fraction, NYHA class II (H)   ? Benign essential hypertension   ? Wound, open, foot, left, sequela   ? Medically noncompliant   ? Cellulitis   ? Acute conjunctivitis of left eye,  unspecified acute conjunctivitis type   ? Allergic conjunctivitis, left   ? Bilateral lower leg cellulitis   ? Non-STEMI (non-ST elevated myocardial infarction) (H)   ? Abnormal CT scan, chest   ? Carotid stenosis, asymptomatic, right   ? S/P CABG x 3       Physical Examination Review of Systems   Vitals:    11/05/18 0759   BP: 116/60   Pulse: 68   Resp: 20     Body mass index is 28.24 kg/(m^2).  Wt Readings from Last 3 Encounters:   11/05/18 194 lb (88 kg)   10/30/18 189 lb (85.7 kg)   10/22/18 184 lb 9.6 oz (83.7 kg)     General Appearance:   Alert, well-appearing and in no acute distress.   HEENT: Atraumatic, normocephalic.  No scleral icterus, normal conjunctivae; mucous membranes pink and moist.     Chest: Chest symmetric, spine straight.   Lungs:   Respirations unlabored: Lungs are clear to auscultation.   Cardiovascular:   Normal first and second heart sounds with no murmurs, rubs, or gallops.  Regular, regular.  Radial and posterior tibial pulses are intact.  Normal JVD, 2-3 bilateral lower extremity edema.  Skin peeling on both hands and legs.       Extremities: No cyanosis or clubbing   Musculoskeletal: Moves all extremities   Skin: Warm, dry, intact.    Neurologic: Mood and affect are appropriate, alert and oriented to person, place, time, and situation    General: WNL  Eyes: WNL  Ears/Nose/Throat: WNL  Lungs: Cough, Shortness of Breath  Heart: Shortness of Breath with activity  Stomach: WNL  Bladder: WNL  Muscle/Joints: WNL  Skin: WNL  Nervous System: WNL  Mental Health: WNL     Blood: WNL       Medical History  Surgical History Family History Social History   Past Medical History:   Diagnosis Date   ? Atrial flutter (H)    ? Candidiasis of perineum 1/3/2018   ? Coronary artery disease due to lipid rich plaque 2000    CABG x2   ? Diabetic ulcer of both feet (H) 10/31/2017   ? Dyslexia    ? Dyslipidemia, goal LDL below 70 2000   ? Essential hypertension    ? Gangrene of left foot (H)    ? Neuropathy (H)   "  ? Paroxysmal Atrial Fibrillation     Brian Moe: 8/2011 Cardioversion; CHADS2 VASC = 5; he is on warfarin and sotalol    ? Type 2 diabetes mellitus, without long-term current use of insulin (H)    ? Unable to function independently 11/13/2017    Past Surgical History:   Procedure Laterality Date   ? CARDIOVERSION  8/25/2011   ? CORONARY ARTERY BYPASS GRAFT  3/6/2000    SVG to OM1, SVG to PDA   ? CV CORONARY ANGIOGRAM N/A 10/1/2018    Procedure: Coronary Angiogram;  Surgeon: Miki Mac MD;  Location: [facility] Cath Lab;  Service:    ? FOOT AMPUTATION Left 11/5/2017    Procedure: LEFT TRANSMETATARSAL AMPUTATION;  Surgeon: Ever Wick MD;  Location: [facility] OR;  Service:    ? INGUINAL HERNIA REPAIR Bilateral 1969 and 1979    Family History   Problem Relation Age of Onset   ? Sudden death Mother 85   ? CABG Father    ? Valvular heart disease Father    ? Esophageal cancer Father 58     cause of death   ? No Medical Problems Son    ? No Medical Problems Grandchild    ? No Medical Problems Grandchild     Social History     Social History   ? Marital status:      Spouse name: N/A   ? Number of children: 1   ? Years of education: N/A     Occupational History   ?  Self Employed     he states he is still working despite being in TCU in 2018     Social History Main Topics   ? Smoking status: Former Smoker     Packs/day: 1.00     Years: 36.00     Types: Cigarettes     Start date: 6/13/1964     Quit date: 4/30/2000   ? Smokeless tobacco: Never Used   ? Alcohol use 3.0 oz/week     2 Glasses of wine, 3 Cans of beer per week   ? Drug use: No   ? Sexual activity: Not Currently     Partners: Female     Other Topics Concern   ? Not on file     Social History Narrative    Ever lived with his son Raciel in member, 2017, but then he went to [facility] after foot amputation.  Ever states he will move to a facility in [place] in June, 2018. The [facility] papers say he uses the \"blue ride\" but " Ever states he has his own vehicle on the campus and is still doing plumbing work in the spring 2018.  Our reception staff at [facility] in [place] confirmed on June 13, 2018 that Ever brought himself to the campus and did not utilize a family member or ride service.  I would also note that he states his granddaughter is name Claire and not Leonela, so I corrected that in the chart (ROSALIA Moe MD).              Medications  Allergies   Current Outpatient Prescriptions   Medication Sig Dispense Refill   ? acetaminophen (TYLENOL) 325 MG tablet Take 2 tablets (650 mg total) by mouth every 6 (six) hours as needed for pain.  0   ? aspirin 81 mg chewable tablet Chew 1 tablet (81 mg total) daily.  0   ? cetirizine (ZYRTEC) 10 MG tablet Take 10 mg by mouth daily.     ? furosemide (LASIX) 40 MG tablet Take 40 mg by mouth daily.     ? gentamicin (GARAMYCIN) 0.1 % ointment Apply 1 application topically daily as needed.     ? glipiZIDE (GLUCOTROL) 10 MG tablet Take 10 mg by mouth daily before breakfast.     ? glipiZIDE (GLUCOTROL) 10 MG tablet Take 15 mg by mouth every evening.     ? Lactobacillus acidophilus 100 mg (1 billion cell) cap Take 1 capsule by mouth 2 (two) times a day. (Patient taking differently: Take 2 capsules by mouth 2 (two) times a day. ) 60 capsule 0   ? loratadine (CLARITIN) 10 mg tablet Take 10 mg by mouth daily.     ? metoprolol tartrate (LOPRESSOR) 25 MG tablet Take 1 tablet (25 mg total) by mouth 2 (two) times a day. 60 tablet 2   ? nitroglycerin (NITROSTAT) 0.4 MG SL tablet Place 0.4 mg under the tongue every 5 (five) minutes as needed for chest pain.      ? NOVOLOG U-100 INSULIN ASPART 100 unit/mL injection Check blood sugar four (4) times daily.  11.9 Type 2 without complications 10 mL PRN   ? omeprazole (PRILOSEC) 20 MG capsule Take 20 mg by mouth daily.      ? simvastatin (ZOCOR) 40 MG tablet Take 40 mg by mouth at bedtime.      ? spironolactone (ALDACTONE) 25 MG tablet Take 25 mg  by mouth daily.     ? urea 10 % lotion Apply 1 application topically daily as needed.     ? warfarin (COUMADIN/JANTOVEN) 5 MG tablet Take one 5mg tablet tonight and have labs rechecked and coumadin dosed based on INR 5 tablet 0   ? albuterol (PROVENTIL) 2.5 mg /3 mL (0.083 %) nebulizer solution Take 3 mL (2.5 mg total) by nebulization every 6 (six) hours as needed for wheezing or shortness of breath.  0     Current Facility-Administered Medications   Medication Dose Route Frequency Provider Last Rate Last Dose   ? lidocaine 2 % jelly (XYLOCAINE)   Topical PRN Miriam Quinones, CNP   1 application at 10/22/18 1349      Allergies   Allergen Reactions   ? Latex    ? Penicillins Rash     Childhood rxn  -- tolerated cefazolin 10/4/18      Medical, surgical, family, social history, and medications were all reviewed and updated as necessary.   Lab Results    Chemistry CBC/INR CHOLESTROL   Lab Results   Component Value Date    CREATININE 1.35 (H) 10/25/2018    BUN 31 (H) 10/25/2018     10/25/2018    K 4.9 10/25/2018     10/25/2018    CO2 26 10/25/2018     Creatinine (mg/dL)   Date Value   10/25/2018 1.35 (H)   10/22/2018 1.34 (H)   10/18/2018 1.38 (H)   10/11/2018 1.20     Lab Results   Component Value Date     (H) 10/22/2018    Lab Results   Component Value Date    WBC 8.9 10/18/2018    HGB 10.4 (L) 10/18/2018    HCT 33.5 (L) 10/18/2018    MCV 87 10/18/2018     10/18/2018     Lab Results   Component Value Date    INR 2.60 (!) 11/01/2018      Lab Results   Component Value Date    CHOL 115 08/25/2017    HDL 26 (L) 08/25/2017    LDLCALC 63 08/25/2017    TRIG 130 08/25/2017          Total Time- 25 minutes with greater than 50% spent talking to patient and family regarding patient's relevant diagnoses.  This note has been dictated using voice recognition software. Any grammatical, typographical, or context distortions are unintentional and inherent to the software.    Nayla Acevedo RN,   [facility]   Electrophysiology  [phone]

## 2021-06-26 NOTE — PROGRESS NOTES
Progress Notes by Noa Schilling CNP at 4/25/2018 10:30 AM     Author: Noa Schilling CNP Service: -- Author Type: Nurse Practitioner    Filed: 4/25/2018 11:48 AM Encounter Date: 4/25/2018 Status: Signed    : Noa Schilling CNP (Nurse Practitioner)           Click to link to [facility] HEART CARE NOTE      Assessment/Recommendations   Assessment:    1.  Heart failure with preserved ejection fraction, NYHA class II: No signs of fluid retention.  He continues to have fatigue.  His weights have increased slightly at his facility but he states this is due to increase intake of food. He is not on a low sodium diet at his facility. He watched the heart failure video today.    2. Hypertension: Controlled. Blood pressure 98/60. Denies lightheadedness or dizziness      Plan:  1.  Continue current medications  2.  Ordered low sodium diet at his facility  3. Continue daily weights    Ever Crane will follow up with Dr Moe June 13 and in the heart failure clinic in 3 months.     History of Present Illness    Mr. Ever Crane is a 73 y.o. male seen at [facility] heart failure clinic today for continued follow-up.  His sister Miriam accompanies him today.  He follows up for heart failure with preserved ejection fraction.  His most recent echocardiogram was done January 25, 2018 which showed an ejection fraction of 55%.  He has a past medical history significant for hypertension, coronary artery disease, atrial fibrillation, dyslipidemia, and diabetes.  He had coronary artery bypass graft in March 2000.    Today, he comes in with mild fatigue.  He denies any dyspnea on exertion, orthopnea, or PND.  He denies any abdominal bloating or lower extremity edema.  He denies lightheadedness, shortness of breath, dyspnea on exertion, orthopnea, PND, palpitations, chest pain, abdominal fullness/bloating and lower extremity edema.      He is monitoring home weights which are  stable between 196-199 pounds.  He is not following a low sodium diet. He is currently not on a low sodium diet as his facility.      ECHO 1/25/2018:   Summary     Left ventricle ejection fraction is normal. The calculated left ventricular ejection fraction is 55%.    The following segments are hypokinetic: basal inferolateral. All other segments are normal.    Left Atrium: Left atrial volume is moderately increased.    Aortic Valve: The valve is tricuspid. The aortic valve is sclerotic without reduced excursion. Mild aortic regurgitation.    Estimated central venous pressure equal to 13 mmHg.    The estimated systolic pulmonary artery pressure is 50 mmhg.    When compared to the previous study dated 11/3/2017, no significant change.          Physical Examination Review of Systems   Vitals:    04/25/18 1057   BP: 98/60   Pulse: 68   Resp: 16     Body mass index is 28.03 kg/(m^2).  Wt Readings from Last 3 Encounters:   04/25/18 201 lb (91.2 kg)   04/02/18 204 lb 4.8 oz (92.7 kg)   02/23/18 194 lb (88 kg)       General Appearance:     Alert, cooperative and in no acute distress.   ENT/Mouth: membranes moist, no oral lesions or bleeding gums.      EYES:  no scleral icterus, normal conjunctivae   Neck: no carotid bruits or thyromegaly   Chest/Lungs:   lungs are clear to auscultation, no rales or wheezing, respirations unlabored   Cardiovascular:   Regular. Normal first and second heart sounds with no murmurs, rubs, or gallops; the carotid, radial and posterior tibial pulses are intact, Jugular venous pressure normal, no edema     Abdomen:  Soft, nontender, nondistended, bowel sounds present   Extremities: no cyanosis or clubbing   Skin: warm, dry.    Neurologic: mood and affect are appropriate, alert and oriented x3      General: Weight Gain  Eyes: WNL  Ears/Nose/Throat: WNL  Lungs: WNL  Heart: Arm Pain, Leg Swelling  Stomach: WNL  Bladder: WNL  Muscle/Joints: Joint Pain  Skin: WNL  Nervous System: Daytime  Sleepiness  Mental Health: WNL     Blood: WNL     Medical History  Surgical History Family History Social History   Past Medical History:   Diagnosis Date   ? Atrial flutter    ? Coronary Artery Disease     CABG x2   ? Diabetic ulcer of both feet 10/31/2017   ? Dyslipidemia, goal LDL below 70    ? Foot ulcer    ? Gangrene of left foot    ? Hypertension    ? Neuropathy    ? Paroxysmal Atrial Fibrillation     Brian Moe: 2011 Cardioversion; CHADS2 = 2 he is on warfarin and sotalol    ? Type 2 Diabetes Mellitus     Past Surgical History:   Procedure Laterality Date   ? CARDIOVERSION  2011   ? CORONARY ARTERY BYPASS GRAFT  3/6/2000    SVG to OM1, SVG to PDA   ? FOOT AMPUTATION Left 2017    Procedure: LEFT TRANSMETATARSAL AMPUTATION;  Surgeon: Ever Wick MD;  [place] [facility]  Service:    ? INGUINAL HERNIA REPAIR Bilateral  and     Family History   Problem Relation Age of Onset   ? Sudden death Mother 85   ? CABG Father    ? Valvular heart disease Father    ? Esophageal cancer Father 58      of this   ? No Medical Problems Son     Social History     Social History   ? Marital status:      Spouse name: N/A   ? Number of children: 1   ? Years of education: N/A     Occupational History   ?  Self Employed     active     Social History Main Topics   ? Smoking status: Former Smoker     Types: Cigarettes     Quit date: 2000   ? Smokeless tobacco: Never Used   ? Alcohol use 0.0 oz/week     2 - 3 Glasses of wine, 2 - 3 Cans of beer per week   ? Drug use: No   ? Sexual activity: Yes     Other Topics Concern   ? Not on file     Social History Narrative    Two granddaughters Haven Gipson    Patient Lives with son Raciel  2017              Medications  Allergies   Current Outpatient Prescriptions   Medication Sig Dispense Refill   ? acetaminophen (TYLENOL) 500 MG tablet Take 2 tablets (1,000 mg total) by mouth every 6 (six) hours as needed.  0   ? albuterol  (PROVENTIL) 2.5 mg /3 mL (0.083 %) nebulizer solution Take 3 mL (2.5 mg total) by nebulization every 6 (six) hours as needed for wheezing or shortness of breath.  0   ? furosemide (LASIX) 40 MG tablet Take 1 tablet (40 mg total) by mouth daily.  0   ? gentamicin (GARAMYCIN) 0.1 % ointment Apply as instructed to ulcer daily 30 g 1   ? glipiZIDE (GLUCOTROL) 5 MG tablet Take 5 mg by mouth 2 (two) times a day before meals.     ? lisinopril (PRINIVIL,ZESTRIL) 10 MG tablet Take 10 mg by mouth daily.     ? loperamide (IMODIUM) 2 mg capsule Take 1 capsule (2 mg total) by mouth 3 (three) times a day as needed for diarrhea.  0   ? metoprolol succinate (TOPROL-XL) 50 MG 24 hr tablet Take 50 mg by mouth 2 (two) times a day.      ? multivitamin with minerals (THERA-M) 9 mg iron-400 mcg Tab tablet Take 1 tablet by mouth daily.     ? mupirocin (BACTROBAN) 2 % ointment Apply as instructed to ulcer daily 30 g 1   ? nitroglycerin (NITROSTAT) 0.4 MG SL tablet Place 0.4 mg under the tongue every 5 (five) minutes as needed for chest pain.      ? omeprazole (PRILOSEC) 20 MG capsule Take 20 mg by mouth daily.      ? simvastatin (ZOCOR) 40 MG tablet Take 40 mg by mouth at bedtime.      ? spironolactone (ALDACTONE) 25 MG tablet Take 1 tablet (25 mg total) by mouth daily.  0   ? sulfamethoxazole-trimethoprim (SEPTRA DS) 800-160 mg per tablet Take 1 tablet by mouth 2 (two) times a day for 10 days. 20 tablet 0   ? urea-alpha hydroxy acids (ATRAC-TAIN, WITH AHA,) 10-4 % Crea Apply 1 application topically daily. Apply after washing legs & feet with water     ? WARFARIN SODIUM (WARFARIN ORAL) Take by mouth. 3/26/18 INR 2.9 Cont 3.5mg qd. Next INR 4/2.  3/21/18 INR 3.1 Cont 3.5mg qd. Next INR 3/26.  3/15/18 INR 1.6 3.5mg qd. Next INR 3/21.     ? dextromethorphan-guaiFENesin (ROBITUSSIN-DM)  mg/5 mL liquid Take 10 mL by mouth every 4 (four) hours as needed (cough).     ? furosemide (LASIX) 40 MG tablet Take 0.5 tablets (20 mg total) by mouth  daily as needed (Extra dose at noon eache day if weight gain> 2lbs in 24 hours).  0   ? Eusebio Therapuetic Nutrition 7-7-1.5 gram PwPk Take 1 packet by mouth daily.       Current Facility-Administered Medications   Medication Dose Route Frequency Provider Last Rate Last Dose   ? lidocaine 2 % jelly (XYLOCAINE)   Topical PRN Miriam Quinones CNP   1 application at 02/19/18 0929      Allergies   Allergen Reactions   ? Latex    ? Penicillins          Lab Results    Chemistry CBC BNP   Lab Results   Component Value Date    CREATININE 1.37 (H) 04/20/2018    BUN 40 (H) 04/20/2018     04/20/2018    K 4.6 04/20/2018     04/20/2018    CO2 23 04/20/2018     Creatinine (mg/dL)   Date Value   04/20/2018 1.37 (H)   03/12/2018 1.12   02/23/2018 1.63 (H)   01/30/2018 1.38 (H)    Lab Results   Component Value Date    WBC 7.3 04/20/2018    HGB 12.8 (L) 04/20/2018    HCT 40.1 04/20/2018    MCV 87 04/20/2018     04/20/2018    Lab Results   Component Value Date     (H) 01/25/2018     BNP (pg/mL)   Date Value   01/25/2018 492 (H)   01/04/2016 25   06/05/2013 77 (H)          25 minutes were spent with the patient with greater than 50% spent on education and counseling.      Noa Schilling, CNP  [facility]   Heart Failure Clinic

## 2021-06-27 NOTE — PROGRESS NOTES
Progress Notes by Indira Freed LPN at 3/7/2019 11:30 AM     Author: Indira Freed LPN Service: -- Author Type: Licensed Nurse    Filed: 3/7/2019 11:18 AM Encounter Date: 3/7/2019 Status: Signed    : Indira Freed LPN (Licensed Nurse)       Nurse Visit      Left medial upper leg (by knee)      Left lateral lower leg    Chief Complaint: Patient presents to clinic for assessment and treatment of their ulcer and and swelling    Dressing on Arrival: 2 layer on left leg and endoform on left lateral leg wound and tegaderm foam adhesive on left medial upper leg wound (scab)    Patient came in today for dressing change and 2 layer re wrap per order from Miriam Quinones. Patient rated pain 0 out of 10. Removed dressings and cleansed skin with Remedy skin cleanser and cleaned wound bed with normal saline. Applied lotion to inta ct skin. To left lateral leg applied endoform and an abd pad, to the left medial leg applied silvercel and tegaderm foam adhesive, to left wound by knee applied tegaderm foam adhesive. Re wrapped 2 layer compression wrap to left leg. Patient tolerated dressing change well.     Allergies:   Allergies   Allergen Reactions   ? Latex    ? Penicillins Rash     Childhood rxn  -- tolerated cefazolin 10/4/18       Medications:   Current Outpatient Medications:   ?  acetaminophen (TYLENOL) 325 MG tablet, Take 2 tablets (650 mg total) by mouth every 6 (six) hours as needed for pain., Disp: , Rfl: 0  ?  albuterol (PROVENTIL) 2.5 mg /3 mL (0.083 %) nebulizer solution, Take 3 mL (2.5 mg total) by nebulization every 6 (six) hours as needed for wheezing or shortness of breath., Disp: , Rfl: 0  ?  aluminum-magnesium hydroxide-simethicone (MAALOX ADVANCED) 200-200-20 mg/5 mL Susp, Take 30 mL by mouth every 4 (four) hours as needed., Disp: , Rfl:   ?  aspirin 81 mg chewable tablet, Chew 1 tablet (81 mg total) daily., Disp: , Rfl: 0  ?  bacitracin 500 unit/gram ointment, Apply 1 application topically  daily. To chest tube sites, Disp: , Rfl:   ?  bisacodyl (DULCOLAX) 5 mg EC tablet, Take 10 mg by mouth daily as needed for constipation., Disp: , Rfl:   ?  carbamide peroxide (DEBROX) 6.5 % otic solution, Administer 2 drops into both ears at bedtime as needed.    , Disp: , Rfl:   ?  cetirizine (ZYRTEC) 10 MG tablet, Take 10 mg by mouth daily., Disp: , Rfl:   ?  dextromethorphan-guaifenesin (DIABETIC TUSSIN DM)  mg/5 mL Liqd, Take 10 mL by mouth 4 (four) times a day as needed., Disp: , Rfl:   ?  diphenhydrAMINE (BENADRYL) 50 MG capsule, Take 50 mg by mouth 3 (three) times a day as needed for itching., Disp: , Rfl:   ?  emollient (VANICREAM) Crea, Apply 1 application topically 3 (three) times a day as needed (dry skin)., Disp: , Rfl: 0  ?  furosemide (LASIX) 20 MG tablet, Take 20 mg by mouth daily as needed (at noon, if weight increase 2lbs in 24 hours).    , Disp: , Rfl:   ?  gentamicin (GARAMYCIN) 0.1 % ointment, Apply 1 application topically daily as needed., Disp: , Rfl:   ?  glipiZIDE (GLUCOTROL) 10 MG tablet, Take 10 mg by mouth daily before breakfast., Disp: , Rfl:   ?  glipiZIDE (GLUCOTROL) 10 MG tablet, Take 15 mg by mouth every evening., Disp: , Rfl:   ?  hydrocortisone 1 % ointment, Apply topically 2 (two) times a day. Apply to face for skin rash, Disp: 30 g, Rfl: 0  ?  ketotifen (ZADITOR/ZYRTEC ITCHY EYES) 0.025 % (0.035 %) ophthalmic solution, Administer 1 drop to both eyes 2 (two) times a day., Disp: 10 mL, Rfl: 0  ?  Lactobacillus acidophilus 100 mg (1 billion cell) cap, Take 1 capsule by mouth 2 (two) times a day., Disp: 60 capsule, Rfl: 0  ?  MEPHYTON 5 mg tablet, , Disp: , Rfl: 0  ?  miconazole (DESENEX) 2 % powder, Apply 1 application topically daily as needed for itching., Disp: , Rfl:   ?  nitroglycerin (NITROSTAT) 0.4 MG SL tablet, Place 0.4 mg under the tongue every 5 (five) minutes as needed for chest pain. , Disp: , Rfl:   ?  omeprazole (PRILOSEC) 20 MG capsule, Take 20 mg by mouth  daily. , Disp: , Rfl:   ?  simvastatin (ZOCOR) 40 MG tablet, Take 40 mg by mouth at bedtime. , Disp: , Rfl:   ?  spironolactone (ALDACTONE) 25 MG tablet, Take 25 mg by mouth daily., Disp: , Rfl:   ?  urea 10 % lotion, Apply 1 application topically daily as needed., Disp: , Rfl:   ?  warfarin (COUMADIN/JANTOVEN) 4 MG tablet, Take 4 mg by mouth daily., Disp: , Rfl:     Current Facility-Administered Medications:   ?  lidocaine 2 % jelly (XYLOCAINE), , Topical, PRN, Stacie, Miriam Johnson, CNP, 1 application at 01/10/19 0951    Vital Signs: /80   Pulse 76   Temp 98.2  F (36.8  C) (Oral)   Resp 16       Assessment:    General:  Patient presents to clinic in no apparent distress.  Psychiatric:  Alert and oriented .   Lower extremity:  edema is present.    Integumentary:  Skin is uniformly warm, dry and pink.    Wound size:   VASC Wound 10/22/18 left medial leg vein harvest (below knee) (Active)   Pre Size Length 1.3 2/21/2019 11:00 AM   Pre Size Width 0.5 2/21/2019 11:00 AM   Pre Size Depth 0.1 2/21/2019 11:00 AM   Pre Total Sq cm 0.65 2/21/2019 11:00 AM   Post Size Length 2.4 2/5/2019  2:00 PM   Post Size Width 0.9 2/5/2019  2:00 PM   Post Size Depth 0.4 1/22/2019  3:00 PM   Post Total Sq cm 0.7 12/3/2018  1:00 PM   Undermined no 1/22/2019  3:00 PM   Tunneling no 1/22/2019  3:00 PM   Description scabbed 2/28/2019 11:00 AM   Prodcut Used Bactroban 10/22/2018  2:00 PM       VASC Wound 11/12/18 left lateral lower leg (Active)   Pre Size Length 0.8 2/28/2019 11:00 AM   Pre Size Width 0.4 2/28/2019 11:00 AM   Pre Size Depth 0.1 2/28/2019 11:00 AM   Pre Total Sq cm 0.32 2/28/2019 11:00 AM   Post Size Length 2 2/5/2019  2:00 PM   Post Size Width 1.8 2/5/2019  2:00 PM   Post Size Depth 0.1 12/20/2018  9:00 AM   Undermined n 11/12/2018  2:00 PM   Tunneling n 11/12/2018  2:00 PM   Description weeping 12/3/2018  1:00 PM       Wound 08/11/18 Non-pressure related ulcer Leg Right;Anterior (front) (Active)       Wound 01/04/19  Non-pressure related ulcer Leg Left (Active)       Wound 19 Non-pressure related ulcer Leg Right (Active)       Incision 10/04/18 Leg Left (Active)      Undermining is not present.    The periwoundskin is dry      Vasc Edema 10/22/2018 2018 12/3/2018 1/10/2019 2019   Right just above MTP 23.5 24 23.5 23.2 -   Right Ankle 24 25 24.3 23.5 -   Right Widest Calf 35.5 39.1 36 39 -   Right Thigh Up 10cm 41.5 46.2 45.5  46 -   Left - just above MTP 26.5 27 26 25.5 27.5   Left Ankle 23.0 23 23.5 23 22.1   Left Widest Calf 32.5 35.1 34.8 35.4 31.1   Left Thigh Up 10cm 40.8 40.2 44.5 46  45.5       Plan:         1. Patient will follow up on 3/15/19         2. Treatment provided will include irrigation and dressings to promote autolytic debridement and will be as listed below     Cleansed with: Normal Saline    Protected skin with: Remedy Skin Repair    Dressings Applied: To left lateral leg applied endoform and an abd pad, to the left medial leg applied silvercel and tegaderm foam adhesive, to left wound by knee applied tegaderm foam adhesive    Compression Applied to the Left Le-Layer Coban    Compression Applied to the Right Leg: NA- patient wearing dermafit    Offloading applied: NA    Trial Products: no  Provider notified regarding concerns: no  Treatment Changes: no    Educational Barriers: none  Taught Regarding: Acitivity, Compliance, Compression, Dressing and Hygiene  Teaching Method: Exaplanation and DC sheet

## 2021-06-27 NOTE — PROGRESS NOTES
Progress Notes by Gustabo Castorena LPN at 2/28/2019 11:20 AM     Author: Gustabo Castorena LPN Service: -- Author Type: Licensed Nurse    Filed: 2/28/2019  1:38 PM Encounter Date: 2/28/2019 Status: Signed    : Gustabo Castorena LPN (Licensed Nurse)                 Nurse Visit    Chief Complaint: Patient presents to clinic for assement, and treatment of their ulcer and and swelling    Dressing on Arrival: collagen, abd pad, roll gauze on left lateral lower leg. Tegaderm foam adhesive on left medial knee.     Allergies:   Allergies   Allergen Reactions   ? Latex    ? Penicillins Rash     Childhood rxn  -- tolerated cefazolin 10/4/18       Medications:   Current Outpatient Medications:   ?  acetaminophen (TYLENOL) 325 MG tablet, Take 2 tablets (650 mg total) by mouth every 6 (six) hours as needed for pain., Disp: , Rfl: 0  ?  albuterol (PROVENTIL) 2.5 mg /3 mL (0.083 %) nebulizer solution, Take 3 mL (2.5 mg total) by nebulization every 6 (six) hours as needed for wheezing or shortness of breath., Disp: , Rfl: 0  ?  aluminum-magnesium hydroxide-simethicone (MAALOX ADVANCED) 200-200-20 mg/5 mL Susp, Take 30 mL by mouth every 4 (four) hours as needed., Disp: , Rfl:   ?  aspirin 81 mg chewable tablet, Chew 1 tablet (81 mg total) daily., Disp: , Rfl: 0  ?  bacitracin 500 unit/gram ointment, Apply 1 application topically daily. To chest tube sites, Disp: , Rfl:   ?  bisacodyl (DULCOLAX) 5 mg EC tablet, Take 10 mg by mouth daily as needed for constipation., Disp: , Rfl:   ?  carbamide peroxide (DEBROX) 6.5 % otic solution, Administer 2 drops into both ears at bedtime as needed.    , Disp: , Rfl:   ?  cetirizine (ZYRTEC) 10 MG tablet, Take 10 mg by mouth daily., Disp: , Rfl:   ?  dextromethorphan-guaifenesin (DIABETIC TUSSIN DM)  mg/5 mL Liqd, Take 10 mL by mouth 4 (four) times a day as needed., Disp: , Rfl:   ?  diphenhydrAMINE (BENADRYL) 50 MG capsule, Take 50 mg by mouth 3 (three) times a day as  needed for itching., Disp: , Rfl:   ?  emollient (VANICREAM) Crea, Apply 1 application topically 3 (three) times a day as needed (dry skin)., Disp: , Rfl: 0  ?  furosemide (LASIX) 20 MG tablet, Take 20 mg by mouth daily as needed (at noon, if weight increase 2lbs in 24 hours).    , Disp: , Rfl:   ?  gentamicin (GARAMYCIN) 0.1 % ointment, Apply 1 application topically daily as needed., Disp: , Rfl:   ?  glipiZIDE (GLUCOTROL) 10 MG tablet, Take 10 mg by mouth daily before breakfast., Disp: , Rfl:   ?  glipiZIDE (GLUCOTROL) 10 MG tablet, Take 15 mg by mouth every evening., Disp: , Rfl:   ?  hydrocortisone 1 % ointment, Apply topically 2 (two) times a day. Apply to face for skin rash, Disp: 30 g, Rfl: 0  ?  ketotifen (ZADITOR/ZYRTEC ITCHY EYES) 0.025 % (0.035 %) ophthalmic solution, Administer 1 drop to both eyes 2 (two) times a day., Disp: 10 mL, Rfl: 0  ?  Lactobacillus acidophilus 100 mg (1 billion cell) cap, Take 1 capsule by mouth 2 (two) times a day., Disp: 60 capsule, Rfl: 0  ?  MEPHYTON 5 mg tablet, , Disp: , Rfl: 0  ?  miconazole (DESENEX) 2 % powder, Apply 1 application topically daily as needed for itching., Disp: , Rfl:   ?  nitroglycerin (NITROSTAT) 0.4 MG SL tablet, Place 0.4 mg under the tongue every 5 (five) minutes as needed for chest pain. , Disp: , Rfl:   ?  omeprazole (PRILOSEC) 20 MG capsule, Take 20 mg by mouth daily. , Disp: , Rfl:   ?  simvastatin (ZOCOR) 40 MG tablet, Take 40 mg by mouth at bedtime. , Disp: , Rfl:   ?  spironolactone (ALDACTONE) 25 MG tablet, Take 25 mg by mouth daily., Disp: , Rfl:   ?  urea 10 % lotion, Apply 1 application topically daily as needed., Disp: , Rfl:   ?  warfarin (COUMADIN/JANTOVEN) 4 MG tablet, Take 4 mg by mouth daily., Disp: , Rfl:     Current Facility-Administered Medications:   ?  lidocaine 2 % jelly (XYLOCAINE), , Topical, PRN, Miriam Quinones, CNP, 1 application at 01/10/19 0951    Vital Signs: /84 (Patient Site: Left Arm, Patient Position:  Sitting, Cuff Size: Adult Regular)   Pulse 64 Comment: Irregular  Temp 97.9  F (36.6  C) (Oral)   Resp 16       Assessment:    General:  Patient presents to clinic in no apparent distress.  Psychiatric:  Alert and oriented x3.   Lower extremity:  edema is present.    Integumentary:  Skin is uniformly warm, dry and pink.    Wound size:   VASC Wound 10/22/18 left medial leg vein harvest (below knee) (Active)   Pre Size Length 1.3 2/21/2019 11:00 AM   Pre Size Width 0.5 2/21/2019 11:00 AM   Pre Size Depth 0.1 2/21/2019 11:00 AM   Pre Total Sq cm 0.65 2/21/2019 11:00 AM   Post Size Length 2.4 2/5/2019  2:00 PM   Post Size Width 0.9 2/5/2019  2:00 PM   Post Size Depth 0.4 1/22/2019  3:00 PM   Post Total Sq cm 0.7 12/3/2018  1:00 PM   Undermined no 1/22/2019  3:00 PM   Tunneling no 1/22/2019  3:00 PM   Description scabbed 2/28/2019 11:00 AM   Prodcut Used Bactroban 10/22/2018  2:00 PM       VASC Wound 11/12/18 left lateral lower leg (Active)   Pre Size Length 0.8 2/28/2019 11:00 AM   Pre Size Width 0.4 2/28/2019 11:00 AM   Pre Size Depth 0.1 2/28/2019 11:00 AM   Pre Total Sq cm 0.32 2/28/2019 11:00 AM   Post Size Length 2 2/5/2019  2:00 PM   Post Size Width 1.8 2/5/2019  2:00 PM   Post Size Depth 0.1 12/20/2018  9:00 AM   Undermined n 11/12/2018  2:00 PM   Tunneling n 11/12/2018  2:00 PM   Description weeping 12/3/2018  1:00 PM       Wound 08/11/18 Non-pressure related ulcer Leg Right;Anterior (front) (Active)       Wound 01/04/19 Non-pressure related ulcer Leg Left (Active)       Wound 01/04/19 Non-pressure related ulcer Leg Right (Active)       Incision 10/04/18 Leg Left (Active)      Undermining is not present.    The periwoundskin is pink and WNL      Plan:         1. Patient will in 1 week         2. Treatment provided will include irrigation and dressings to promote autolytic debridement and will be as listed below     Cleansed with: Normal Saline    Protected skin with: Remedy Skin Repair    Dressings Applied:  Foam Adhesive and Collagen    Compression Applied to the Left Le-Layer Coban    Compression Applied to the Right Leg: Dermafit    Offloading applied: None    Trial Products: no  Provider notified regarding concerns: no  Treatment Changes: no    Educational Barriers: none  Taught Regarding: Acitivity, Compliance, Compression and Dressing  Teaching Method: Exaplanation and DC sheet               Gustabo Castorena LPN [id number]

## 2021-06-27 NOTE — PROGRESS NOTES
Progress Notes by Miriam Quinones CNP at 4/18/2019  2:20 PM     Author: Miriam Quinones CNP Service: -- Author Type: Nurse Practitioner    Filed: 4/18/2019  3:44 PM Encounter Date: 4/18/2019 Status: Signed    : Miriam Quinones CNP (Nurse Practitioner)       Date of Service:4/18/2019    Chief Complaint:   Chief Complaint   Patient presents with   ? Wound Check     left leg ulcers       History:    The patient was last seen by me on 3/15/2019 when Endoform and xeroform was started and a 2-layer wrap continued. He states that he got new wound last week when the wrap was cut off of his leg.  He is tolerating his 2-layer wrap without difficulty.  It is being changed weekly in our office.    Current Outpatient Medications   Medication Sig Dispense Refill   ? acetaminophen (TYLENOL) 325 MG tablet Take 2 tablets (650 mg total) by mouth every 6 (six) hours as needed for pain.  0   ? albuterol (PROVENTIL) 2.5 mg /3 mL (0.083 %) nebulizer solution Take 3 mL (2.5 mg total) by nebulization every 6 (six) hours as needed for wheezing or shortness of breath.  0   ? aluminum-magnesium hydroxide-simethicone (MAALOX ADVANCED) 200-200-20 mg/5 mL Susp Take 30 mL by mouth every 4 (four) hours as needed.     ? aspirin 81 mg chewable tablet Chew 1 tablet (81 mg total) daily.  0   ? bacitracin 500 unit/gram ointment Apply 1 application topically daily. To chest tube sites     ? bisacodyl (DULCOLAX) 5 mg EC tablet Take 10 mg by mouth daily as needed for constipation.     ? carbamide peroxide (DEBROX) 6.5 % otic solution Administer 2 drops into both ears at bedtime as needed.            ? cetirizine (ZYRTEC) 10 MG tablet Take 10 mg by mouth daily.     ? dextromethorphan-guaifenesin (DIABETIC TUSSIN DM)  mg/5 mL Liqd Take 10 mL by mouth 4 (four) times a day as needed.     ? diphenhydrAMINE (BENADRYL) 50 MG capsule Take 50 mg by mouth 3 (three) times a day as needed for itching.     ? emollient (VANICREAM) Crea  Apply 1 application topically 3 (three) times a day as needed (dry skin).  0   ? furosemide (LASIX) 20 MG tablet Take 20 mg by mouth daily as needed (at noon, if weight increase 2lbs in 24 hours).            ? gentamicin (GARAMYCIN) 0.1 % ointment Apply 1 application topically daily as needed.     ? glipiZIDE (GLUCOTROL) 10 MG tablet Take 10 mg by mouth daily before breakfast.     ? glipiZIDE (GLUCOTROL) 10 MG tablet Take 15 mg by mouth every evening.     ? hydrocortisone 1 % ointment Apply topically 2 (two) times a day. Apply to face for skin rash 30 g 0   ? ketotifen (ZADITOR/ZYRTEC ITCHY EYES) 0.025 % (0.035 %) ophthalmic solution Administer 1 drop to both eyes 2 (two) times a day. 10 mL 0   ? Lactobacillus acidophilus 100 mg (1 billion cell) cap Take 1 capsule by mouth 2 (two) times a day. 60 capsule 0   ? MEPHYTON 5 mg tablet   0   ? miconazole (DESENEX) 2 % powder Apply 1 application topically daily as needed for itching.     ? nitroglycerin (NITROSTAT) 0.4 MG SL tablet Place 0.4 mg under the tongue every 5 (five) minutes as needed for chest pain.      ? omeprazole (PRILOSEC) 20 MG capsule Take 20 mg by mouth daily.      ? simvastatin (ZOCOR) 40 MG tablet Take 40 mg by mouth at bedtime.      ? spironolactone (ALDACTONE) 25 MG tablet Take 25 mg by mouth daily.     ? urea 10 % lotion Apply 1 application topically daily as needed.     ? warfarin (COUMADIN/JANTOVEN) 4 MG tablet Take 4 mg by mouth daily.       Current Facility-Administered Medications   Medication Dose Route Frequency Provider Last Rate Last Dose   ? lidocaine 2 % jelly (XYLOCAINE)   Topical PRN Miriam Quinones, CNP   1 application at 04/18/19 1424       Allergies   Allergen Reactions   ? Latex    ? Penicillins Rash     Childhood rxn  -- tolerated cefazolin 10/4/18       Physical Exam:    Vitals:    04/18/19 1427   BP: 130/78   Pulse: 80   Resp: 16   Temp: 98.1  F (36.7  C)    There is no height or weight on file to calculate BMI.    General:   74 y.o. male in no apparent distress.    Psychiatric:  Alert and oriented x 3.  Cooperative.   Integumentary:  Skin is uniformly warm and dry.    Lower extremity edema: minimal    Photo:        Left leg Ulceration(s):     Wound bed: 100% granulation   Undermining no, Tunneling no   Wound Edge: attached   Periwound:  There is no erika wound erythema, induration, maceration, denudement fluctuance or warmth surrounding the ulcer(s).  Exudate: serosanguaneous            Quantity: minimal  Odor:  absent   Wound Shape regular       Left medial incisional  Ulceration(s): Resolved    Labs:    Lab Results   Component Value Date    SEDRATE 21 (H) 08/31/2018     Lab Results   Component Value Date    CRP 4.0 (H) 01/03/2019     No components found for: CREATINE  Lab Results   Component Value Date    BUN 25 01/04/2019     Lab Results   Component Value Date    HGBA1C 10.2 (H) 03/05/2019         Vasc Edema 12/3/2018 1/10/2019 2/28/2019 3/15/2019 4/18/2019   Right just above MTP 23.5 23.2 - 23.8 -   Right Ankle 24.3 23.5 - 24.5 -   Right Widest Calf 36 39 - 36.8 -   Right Thigh Up 10cm 45.5  46 - 47 -   Left - just above MTP 26 25.5 27.5 26.5 25   Left Ankle 23.5 23 22.1 21.7 21.5   Left Widest Calf 34.8 35.4 31.1 31.5 32   Left Thigh Up 10cm 44.5 46  45.5 46.2 45.5       VASC Wound 10/22/18 left medial leg vein harvest (below knee) (Active)   Pre Size Length 0.5 4/11/2019  1:00 PM   Pre Size Width 0.4 4/11/2019  1:00 PM   Pre Size Depth 0.1 4/11/2019  1:00 PM   Pre Total Sq cm 0.2 4/11/2019  1:00 PM   Post Size Length 0 4/5/2019 12:00 PM   Post Size Width 0 4/5/2019 12:00 PM   Post Size Depth 0 4/5/2019 12:00 PM   Post Total Sq cm 0.4 3/15/2019 12:00 PM   Undermined no 4/11/2019  1:00 PM   Tunneling no 4/11/2019  1:00 PM   Description scab 4/18/2019  2:00 PM   Prodcut Used Bactroban 10/22/2018  2:00 PM       VASC Wound 11/12/18 left lateral lower leg (Active)   Pre Size Length 0.9 4/18/2019  2:00 PM   Pre Size Width 0.9 4/18/2019   2:00 PM   Pre Size Depth 0.1 4/18/2019  2:00 PM   Pre Total Sq cm 0.81 4/18/2019  2:00 PM   Post Size Length 0.5 4/18/2019  2:00 PM   Post Size Width 0.8 4/18/2019  2:00 PM   Post Size Depth 0.01 3/15/2019 12:00 PM   Post Total Sq cm 0 4/5/2019 12:00 PM   Undermined no 4/11/2019  1:00 PM   Tunneling no 4/11/2019  1:00 PM   Description pink 4/11/2019  1:00 PM   Prodcut Used ABD Pad 4/11/2019  1:00 PM       VASC Wound 04/18/19 left lateral lower leg superior (Active)   Pre Size Length 0.5 4/18/2019  2:00 PM   Pre Size Width 0.8 4/18/2019  2:00 PM   Pre Size Depth 0.1 4/18/2019  2:00 PM   Pre Total Sq cm 0.4 4/18/2019  2:00 PM   Post Size Length 0.5 4/18/2019  2:00 PM   Post Size Width 0.5 4/18/2019  2:00 PM       Wound 08/11/18 Non-pressure related ulcer Leg Right;Anterior (front) (Active)       Wound 01/04/19 Non-pressure related ulcer Leg Left (Active)       Wound 01/04/19 Non-pressure related ulcer Leg Right (Active)       Incision 10/04/18 Leg Left (Active)     Imaging:    [facility] RADIOLOGY  LOCATION: [facility]  DATE: 12/20/2018     EXAM: RESTING ANKLE-BRACHIAL INDICES (ABIs)     INDICATION: Lower extremity pain, left leg ulcers, left transmetatarsal amputation.     COMPARISON: None.     EVELYNE FINDINGS:   SEGMENTAL BP  RIGHT (mmHg)  Brachial: 134  High Thigh: 190; Index 1.36  Low Thigh: 173; Index 1.24  Calf: >254; Index -NC-  Ankle (PT): 11.; Index 0.79  Ankle (DP): 138; Index 0.99  Digit: 58; Index 0.41     LEFT (mmHg)  Brachial: 140  High Thigh: 154; Index 1.10  Low Thigh: 155; Index 1.11  Calf: 115; Index 0.82  Ankle (PT): 130; Index 0.93  Ankle (DP): 156; Index 1.11     IMPRESSION:   1. RIGHT LOWER EXTREMITY: Resting ankle-brachial index of 0.99 is likely artifactual secondary to vessel calcification. Great toe pressure of 58 mmHg is adequate for wound healing.     2. LEFT LOWER EXTREMITY: Resting ankle-brachial index of 0.93 is likely artifactual secondary to vessel  calcification. Status post left transmetatarsal amputation.     3. Given development of a new wound, further evaluation with a bilateral lower extremity ultrasound and/or CT angiogram with bilateral lower extremity runoff is suggested.         Assessment:  1. Leg ulcer, left, limited to breakdown of skin (H)     2. Acquired lymphedema of lower extremity     3. Non-insulin dependent type 2 diabetes mellitus (H)     4. Venous hypertension, chronic, bilateral         A new wound was identified today: No      Plan:    1.  Debridement of the Left lateral leg ulcer was recommended.  After consent was obtained and topical 2% Xylocaine was applied under clean conditions, and using a #15 blade,the devitalized dermal tissue was debrided for a total square centimeters of .4.  Following debridement, there was a decrease in the nonviable tissue. The patient tolerated the procedure without any difficulty.     2. Venous and contact dermatitis, resolved.      3.  Venous insufficiency, stable    4.  Left leg ulcer, smaller    5.  Left Medial leg ulcer, resolved    6.  Edema, stable    7.  Diabetes, on Metformin.  6.6 A1c on 11/1/2017.      8.  Treatment:  For the lateral left leg ulcer, will apply Endoform and cover with xerform and an ABD.  A 2-layer wrap will be applied for compression and changed weekly by nurses in our clinic.    9.  Patient will follow up with a nurse visit weekly and me in 2 weeks  for further evaluation and response to the treatment.      Miriam Quinones, APRN, CNP,  [facility]  [phone]

## 2021-06-27 NOTE — PROGRESS NOTES
Progress Notes by Miriam Quinones CNP at 1/22/2019  3:00 PM     Author: Miriam Quinones CNP Service: -- Author Type: Nurse Practitioner    Filed: 1/23/2019 11:46 AM Encounter Date: 1/22/2019 Status: Signed    : Miriam Quinones CNP (Nurse Practitioner)       Date of Service:1/22/2019    Chief Complaint:   Chief Complaint   Patient presents with   ? Follow-up       History:    The patient was last seen by me on 12/20//2018 when melgisorb and an   ABD was start for his newly developed wounds.  Dermafit was applied for compression.  He was admitted to the hospital on 1/3/2019 for a skin rash and cellulitis.  He was given IV antibiotic and discharged on 1/6/2019.  He was instructed to start taking Doxycycline, which he just finished.  Ever continues to participate in cardiac rehab so he does not qualify for home care.  He is coming into our clinic for his 2-layer wrap changes.  He reports that a couple of days ago, the ulcer on his medial leg began bleeding more and it started to become more painful.. He was allergic to the dressing that he had so he removed it.  He is not applying a dressing on it.          Current Outpatient Medications   Medication Sig Dispense Refill   ? acetaminophen (TYLENOL) 325 MG tablet Take 2 tablets (650 mg total) by mouth every 6 (six) hours as needed for pain.  0   ? albuterol (PROVENTIL) 2.5 mg /3 mL (0.083 %) nebulizer solution Take 3 mL (2.5 mg total) by nebulization every 6 (six) hours as needed for wheezing or shortness of breath.  0   ? aluminum-magnesium hydroxide-simethicone (MAALOX ADVANCED) 200-200-20 mg/5 mL Susp Take 30 mL by mouth every 4 (four) hours as needed.     ? aspirin 81 mg chewable tablet Chew 1 tablet (81 mg total) daily.  0   ? bacitracin 500 unit/gram ointment Apply 1 application topically daily. To chest tube sites     ? bisacodyl (DULCOLAX) 5 mg EC tablet Take 10 mg by mouth daily as needed for constipation.     ? carbamide peroxide (DEBROX)  6.5 % otic solution Administer 2 drops into both ears at bedtime as needed.            ? cetirizine (ZYRTEC) 10 MG tablet Take 10 mg by mouth daily.     ? dextromethorphan-guaifenesin (DIABETIC TUSSIN DM)  mg/5 mL Liqd Take 10 mL by mouth 4 (four) times a day as needed.     ? diphenhydrAMINE (BENADRYL) 50 MG capsule Take 50 mg by mouth 3 (three) times a day as needed for itching.     ? emollient (VANICREAM) Crea Apply 1 application topically 3 (three) times a day as needed (dry skin).  0   ? furosemide (LASIX) 20 MG tablet Take 20 mg by mouth daily as needed (at noon, if weight increase 2lbs in 24 hours).            ? gentamicin (GARAMYCIN) 0.1 % ointment Apply 1 application topically daily as needed.     ? glipiZIDE (GLUCOTROL) 10 MG tablet Take 10 mg by mouth daily before breakfast.     ? glipiZIDE (GLUCOTROL) 10 MG tablet Take 15 mg by mouth every evening.     ? hydrocortisone 1 % ointment Apply topically 2 (two) times a day. Apply to face for skin rash 30 g 0   ? ketotifen (ZADITOR/ZYRTEC ITCHY EYES) 0.025 % (0.035 %) ophthalmic solution Administer 1 drop to both eyes 2 (two) times a day. 10 mL 0   ? Lactobacillus acidophilus 100 mg (1 billion cell) cap Take 1 capsule by mouth 2 (two) times a day. 60 capsule 0   ? MEPHYTON 5 mg tablet   0   ? metoprolol tartrate (LOPRESSOR) 25 MG tablet Take 1 tablet (25 mg total) by mouth 2 (two) times a day. 60 tablet 2   ? miconazole (DESENEX) 2 % powder Apply 1 application topically daily as needed for itching.     ? nitroglycerin (NITROSTAT) 0.4 MG SL tablet Place 0.4 mg under the tongue every 5 (five) minutes as needed for chest pain.      ? omeprazole (PRILOSEC) 20 MG capsule Take 20 mg by mouth daily.      ? simvastatin (ZOCOR) 40 MG tablet Take 40 mg by mouth at bedtime.      ? spironolactone (ALDACTONE) 25 MG tablet Take 25 mg by mouth daily.     ? triamcinolone (KENALOG) 0.1 % cream Apply to your legs each leg at bedtime 7 days. (Patient taking differently:  Apply 1 application topically 2 (two) times a day. To lower legs      ) 30 g 2   ? urea 10 % lotion Apply 1 application topically daily as needed.     ? warfarin (COUMADIN/JANTOVEN) 4 MG tablet Take 4 mg by mouth daily.     ? clindamycin (CLEOCIN) 300 MG capsule Take 1 capsule (300 mg total) by mouth 3 (three) times a day for 10 days. 30 capsule 0   ? doxycycline (VIBRA-TABS) 100 MG tablet Take 1 tablet (100 mg total) by mouth 2 (two) times a day for 10 days. 20 tablet 0     Current Facility-Administered Medications   Medication Dose Route Frequency Provider Last Rate Last Dose   ? lidocaine 2 % jelly (XYLOCAINE)   Topical PRN Stacie, Miriam Johnson, CNP   1 application at 01/10/19 0951       Allergies   Allergen Reactions   ? Latex    ? Penicillins Rash     Childhood rxn  -- tolerated cefazolin 10/4/18       Physical Exam:    Vitals:    01/22/19 1450   BP: 124/70   Pulse: 64   Temp: 98.1  F (36.7  C)    There is no height or weight on file to calculate BMI.    General:  73 y.o. male in no apparent distress.    Psychiatric:  Alert and oriented x 3.  Cooperative.   Integumentary:  Skin is uniformly warm and dry.    Lower extremity edema: minimal                      Left lateral leg Ulceration(s):     Wound bed: %100 adherent slough          Undermining no, Tunneling no   Wound Edge: attached   Periwound:  There is no erika wound erythema, induration, maceration, denudement fluctuance or warmth surrounding the ulcer(s).  Exudate: serosanguaneous            Quantity: moderate  Odor:  absent   Wound Shape regular       Left medial incisional  Ulceration(s):     Wound bed: %100 adherent slough          Undermining no, Tunneling no   Wound Edge: attached   Periwound:  There is erika wound erythema, but no induration, maceration, denudement fluctuance or warmth surrounding the ulcer(s).  Exudate: serosanguaneous            Quantity: moderate  Odor:  absent   Wound Shape regular      Labs:    Lab Results   Component Value Date     SEDRATE 21 (H) 08/31/2018     Lab Results   Component Value Date    CRP 4.0 (H) 01/03/2019     No components found for: CREATINE  Lab Results   Component Value Date    BUN 25 01/04/2019     Lab Results   Component Value Date    HGBA1C 6.8 (H) 11/19/2018         Vasc Edema 7/12/2018 10/22/2018 11/12/2018 12/3/2018 1/10/2019   Right just above MTP 24.2 23.5 24 23.5 23.2   Right Ankle 24.5 24 25 24.3 23.5   Right Widest Calf 37.8 35.5 39.1 36 39   Right Thigh Up 10cm 47 41.5 46.2 45.5  46   Left - just above MTP 27.8 26.5 27 26 25.5   Left Ankle 22.2 23.0 23 23.5 23   Left Widest Calf 33.3 32.5 35.1 34.8 35.4   Left Thigh Up 10cm 44.7 40.8 40.2 44.5 46        VASC Wound 10/22/18 left medial leg vein harvest (below knee) (Active)   Pre Size Length 3.2 1/22/2019  3:00 PM   Pre Size Width 1.5 1/22/2019  3:00 PM   Pre Size Depth 0.5 1/22/2019  3:00 PM   Pre Total Sq cm 4.8 1/22/2019  3:00 PM   Post Size Length 3 1/22/2019  3:00 PM   Post Size Width 1.5 1/22/2019  3:00 PM   Post Size Depth 0.4 1/22/2019  3:00 PM   Post Total Sq cm 0.7 12/3/2018  1:00 PM   Undermined no 1/22/2019  3:00 PM   Tunneling no 1/22/2019  3:00 PM   Description 0.6@1700 1/18/2019 12:00 PM   Prodcut Used Bactroban 10/22/2018  2:00 PM       VASC Wound 11/12/18 left lateral lower leg (Active)   Pre Size Length 2.6 1/22/2019  3:00 PM   Pre Size Width 2.2 1/22/2019  3:00 PM   Pre Size Depth 0.1 1/22/2019  3:00 PM   Pre Total Sq cm 5.72 1/22/2019  3:00 PM   Post Size Length 2.5 1/22/2019  3:00 PM   Post Size Width 2.3 1/22/2019  3:00 PM   Post Size Depth 0.1 12/20/2018  9:00 AM   Undermined n 11/12/2018  2:00 PM   Tunneling n 11/12/2018  2:00 PM   Description weeping 12/3/2018  1:00 PM       VASC Wound 01/15/19 right heel (Active)   Pre Size Length 1 1/15/2019  1:00 PM   Pre Size Width 0.2 1/15/2019  1:00 PM   Pre Size Depth 0.1 1/15/2019  1:00 PM   Pre Total Sq cm 0.2 1/15/2019  1:00 PM       Wound 08/11/18 Non-pressure related ulcer Leg Right;Anterior  (front) (Active)       Wound 01/04/19 Non-pressure related ulcer Leg Left (Active)       Wound 01/04/19 Non-pressure related ulcer Leg Right (Active)       Incision 10/04/18 Leg Left (Active)     Imaging:    [facility] RADIOLOGY  LOCATION: [facility] OUTPATIENT SERVICES  DATE: 12/20/2018     EXAM: RESTING ANKLE-BRACHIAL INDICES (ABIs)     INDICATION: Lower extremity pain, left leg ulcers, left transmetatarsal amputation.     COMPARISON: None.     EVELYNE FINDINGS:   SEGMENTAL BP  RIGHT (mmHg)  Brachial: 134  High Thigh: 190; Index 1.36  Low Thigh: 173; Index 1.24  Calf: >254; Index -NC-  Ankle (PT): 11.; Index 0.79  Ankle (DP): 138; Index 0.99  Digit: 58; Index 0.41     LEFT (mmHg)  Brachial: 140  High Thigh: 154; Index 1.10  Low Thigh: 155; Index 1.11  Calf: 115; Index 0.82  Ankle (PT): 130; Index 0.93  Ankle (DP): 156; Index 1.11     IMPRESSION:   1. RIGHT LOWER EXTREMITY: Resting ankle-brachial index of 0.99 is likely artifactual secondary to vessel calcification. Great toe pressure of 58 mmHg is adequate for wound healing.     2. LEFT LOWER EXTREMITY: Resting ankle-brachial index of 0.93 is likely artifactual secondary to vessel calcification. Status post left transmetatarsal amputation.     3. Given development of a new wound, further evaluation with a bilateral lower extremity ultrasound and/or CT angiogram with bilateral lower extremity runoff is suggested.         Assessment:  1. Local infection of wound  doxycycline (VIBRA-TABS) 100 MG tablet    Culture/Gram Stain: Wound    clindamycin (CLEOCIN) 300 MG capsule   2. Leg ulcer, left, limited to breakdown of skin (H)     3. Acquired lymphedema of lower extremity     4. Allergic contact dermatitis due to adhesives     5. PAD (peripheral artery disease) (H)     6. Non-insulin dependent type 2 diabetes mellitus (H)     7. Ischemic cardiomyopathy     8. Ulcer of left lower extremity with fat layer exposed (H)         A new wound was identified today:  No      Plan:    1.  Debridement of the Left leg ulcer was recommended.  After consent was obtained and topical 2% Xylocaine was applied under clean conditions, and using a #15 blade,the devitalized dermal tissue was debrided for a total square centimeters of 5.72.  Following debridement, there was a decrease in the nonviable tissue. The patient tolerated the procedure without any difficulty.     Excisional Debridement of the Left medial leg ulcer was recommended and after consent was obtained and topical 2% Xylocaine was applied, under clean conditions, using a #15 scalpel, the devitalized subcutaneous  tissue in the ulcer base and at the ulcer margins were sharply excised for a total sq. cm of 4.8.    Following excision, there was minimal bleeding tissue, which was easily controlled and a decrease in the nonviable tissue.  The patient tolerated the procedure without difficulty.     2. Venous and contact dermatitis, resolved.  TMC discontinued.     3.  Venous insufficiency, stable    4.  Left lateral leg ulcer, stable.  No improvement.    5.  Left Medial leg ulcer, signs of infection.  Wrote a prescription for clindamycin.  Using the Puga Technique, I obtained an aerobic culture and sensitivity today.    6.  Edema, stable    7.  Diabetes, on Metformin.  6.6 A1c on 11/1/2017.      8.  Treatment:  For the wounds, start silvercel and cover with an ABD.  A 2-layer wrap will be applied for compression. For the medial leg ulcer, a Tegaderm Foam adhesive will be applied on top. He has a history of multiple reactions to adhesive dressings.  If he becomes allergic to this adhesive, I will have no other choice than to apply an ABD with paper tape as a secondary dressing.    9.  Patient will follow up with me in 2 weeks  for further evaluation and response to the treatment.      Miriam Quinones, APRN, CNP,  [facility] [place]  [phone]

## 2021-06-27 NOTE — PROGRESS NOTES
Progress Notes by Jeferson Valenzuela LPN at 1/18/2019 12:40 PM     Author: Jeferson Valenzuela LPN Service: -- Author Type: Licensed Nurse    Filed: 1/18/2019  2:14 PM Encounter Date: 1/18/2019 Status: Addendum    : Jeferson Valenzuela LPN (Licensed Nurse)    Related Notes: Original Note by Jeferson Valenzuela LPN (Licensed Nurse) filed at 1/18/2019  2:13 PM                                 Nurse Visit    Chief Complaint: Patient presents to clinic for assement, and treatment of their ulcer and and swelling    Dressing on Arrival  Iodafoam, abd, rolled gauze, 2 layer lite.    Allergies:   Allergies   Allergen Reactions   ? Latex    ? Penicillins Rash     Childhood rxn  -- tolerated cefazolin 10/4/18       Medications:   Current Outpatient Medications:   ?  acetaminophen (TYLENOL) 325 MG tablet, Take 2 tablets (650 mg total) by mouth every 6 (six) hours as needed for pain., Disp: , Rfl: 0  ?  albuterol (PROVENTIL) 2.5 mg /3 mL (0.083 %) nebulizer solution, Take 3 mL (2.5 mg total) by nebulization every 6 (six) hours as needed for wheezing or shortness of breath., Disp: , Rfl: 0  ?  aluminum-magnesium hydroxide-simethicone (MAALOX ADVANCED) 200-200-20 mg/5 mL Susp, Take 30 mL by mouth every 4 (four) hours as needed., Disp: , Rfl:   ?  aspirin 81 mg chewable tablet, Chew 1 tablet (81 mg total) daily., Disp: , Rfl: 0  ?  bacitracin 500 unit/gram ointment, Apply 1 application topically daily. To chest tube sites, Disp: , Rfl:   ?  bisacodyl (DULCOLAX) 5 mg EC tablet, Take 10 mg by mouth daily as needed for constipation., Disp: , Rfl:   ?  carbamide peroxide (DEBROX) 6.5 % otic solution, Administer 2 drops into both ears at bedtime as needed.    , Disp: , Rfl:   ?  cetirizine (ZYRTEC) 10 MG tablet, Take 10 mg by mouth daily., Disp: , Rfl:   ?  dextromethorphan-guaifenesin (DIABETIC TUSSIN DM)  mg/5 mL Liqd, Take 10 mL by mouth 4 (four) times a day as needed., Disp: , Rfl:   ?  diphenhydrAMINE (BENADRYL) 50 MG capsule, Take 50  mg by mouth 3 (three) times a day as needed for itching., Disp: , Rfl:   ?  emollient (VANICREAM) Crea, Apply 1 application topically 3 (three) times a day as needed (dry skin)., Disp: , Rfl: 0  ?  furosemide (LASIX) 20 MG tablet, Take 20 mg by mouth daily as needed (at noon, if weight increase 2lbs in 24 hours).    , Disp: , Rfl:   ?  gentamicin (GARAMYCIN) 0.1 % ointment, Apply 1 application topically daily as needed., Disp: , Rfl:   ?  glipiZIDE (GLUCOTROL) 10 MG tablet, Take 10 mg by mouth daily before breakfast., Disp: , Rfl:   ?  glipiZIDE (GLUCOTROL) 10 MG tablet, Take 15 mg by mouth every evening., Disp: , Rfl:   ?  hydrocortisone 1 % ointment, Apply topically 2 (two) times a day. Apply to face for skin rash, Disp: 30 g, Rfl: 0  ?  ketotifen (ZADITOR/ZYRTEC ITCHY EYES) 0.025 % (0.035 %) ophthalmic solution, Administer 1 drop to both eyes 2 (two) times a day., Disp: 10 mL, Rfl: 0  ?  Lactobacillus acidophilus 100 mg (1 billion cell) cap, Take 1 capsule by mouth 2 (two) times a day., Disp: 60 capsule, Rfl: 0  ?  MEPHYTON 5 mg tablet, , Disp: , Rfl: 0  ?  metoprolol tartrate (LOPRESSOR) 25 MG tablet, Take 1 tablet (25 mg total) by mouth 2 (two) times a day., Disp: 60 tablet, Rfl: 2  ?  miconazole (DESENEX) 2 % powder, Apply 1 application topically daily as needed for itching., Disp: , Rfl:   ?  nitroglycerin (NITROSTAT) 0.4 MG SL tablet, Place 0.4 mg under the tongue every 5 (five) minutes as needed for chest pain. , Disp: , Rfl:   ?  omeprazole (PRILOSEC) 20 MG capsule, Take 20 mg by mouth daily. , Disp: , Rfl:   ?  simvastatin (ZOCOR) 40 MG tablet, Take 40 mg by mouth at bedtime. , Disp: , Rfl:   ?  spironolactone (ALDACTONE) 25 MG tablet, Take 25 mg by mouth daily., Disp: , Rfl:   ?  triamcinolone (KENALOG) 0.1 % cream, Apply to your legs each leg at bedtime 7 days. (Patient taking differently: Apply 1 application topically 2 (two) times a day. To lower legs   ), Disp: 30 g, Rfl: 2  ?  urea 10 % lotion,  Apply 1 application topically daily as needed., Disp: , Rfl:   ?  warfarin (COUMADIN/JANTOVEN) 4 MG tablet, Take 4 mg by mouth daily., Disp: , Rfl:     Current Facility-Administered Medications:   ?  lidocaine 2 % jelly (XYLOCAINE), , Topical, PRN, Stacie, Miriam Johnson, CNP, 1 application at 01/10/19 0951    Vital Signs: There were no vitals taken for this visit.      Assessment:    General:  Patient presents to clinic in no apparent distress.  Psychiatric:  Alert and oriented x3.   Lower extremity:  edema is present.    Integumentary:  Skin is uniformly warm, dry and pink.    Wound size:   VASC Wound 10/22/18 left medial leg vein harvest (below knee) (Active)   Pre Size Length 1.6 1/18/2019 12:00 PM   Pre Size Width 0.8 1/18/2019 12:00 PM   Pre Size Depth 0.6 1/18/2019 12:00 PM   Pre Total Sq cm 1.28 1/18/2019 12:00 PM   Post Size Length 0.8 12/20/2018  9:00 AM   Post Size Width 0.8 12/20/2018  9:00 AM   Post Size Depth 0.1 12/20/2018  9:00 AM   Post Total Sq cm 0.7 12/3/2018  1:00 PM   Undermined no 1/18/2019 12:00 PM   Tunneling no 1/18/2019 12:00 PM   Description 0.6@1700 1/18/2019 12:00 PM   Prodcut Used Bactroban 10/22/2018  2:00 PM       VASC Wound 11/12/18 left lateral lower leg (Active)   Pre Size Length 2.6 1/18/2019 12:00 PM   Pre Size Width 2.2 1/18/2019 12:00 PM   Pre Size Depth 0.1 1/18/2019 12:00 PM   Pre Total Sq cm 5.72 1/18/2019 12:00 PM   Post Size Length 2.8 1/10/2019  9:00 AM   Post Size Width 2.2 1/10/2019  9:00 AM   Post Size Depth 0.1 12/20/2018  9:00 AM   Undermined n 11/12/2018  2:00 PM   Tunneling n 11/12/2018  2:00 PM   Description weeping 12/3/2018  1:00 PM       VASC Wound 01/15/19 right heel (Active)   Pre Size Length 1 1/15/2019  1:00 PM   Pre Size Width 0.2 1/15/2019  1:00 PM   Pre Size Depth 0.1 1/15/2019  1:00 PM   Pre Total Sq cm 0.2 1/15/2019  1:00 PM       Wound 08/11/18 Non-pressure related ulcer Leg Right;Anterior (front) (Active)       Wound 01/04/19 Non-pressure related  ulcer Leg Left (Active)       Wound 19 Non-pressure related ulcer Leg Right (Active)       Incision 10/04/18 Leg Left (Active)      Undermining is not present  The periwoundskin is wnl, WNL, denudement, erythema, induration, maceration and or warmth      Plan:         1. Patient will in 4 days         2. Treatment provided will include irrigation and dressings to promote autolytic debridement and will be as listed below     Cleansed with: Normal Saline    Protected skin with: Remedy Skin Repair    Dressings Applied: Medihoney, abd's  Iodaform, rolled gauze and 2 layer lite.     Compression Applied to the Left Le-Layer [place] Lite    Compression Applied to the Right Leg: Tubigrip    Offloading applied: None    Trial Products: no  Provider notified regarding concerns: no  Treatment Changes: yes    Educational Barriers: cognitive loss and OCD  Taught Regarding: Compliance, Compression and Dressing  Teaching Method: Exaplanation and DC sheet

## 2021-06-27 NOTE — PROGRESS NOTES
Progress Notes by Miriam Quinones CNP at 2/5/2019  3:00 PM     Author: Miriam Quinones CNP Service: -- Author Type: Nurse Practitioner    Filed: 2/6/2019  3:09 PM Encounter Date: 2/5/2019 Status: Signed    : Miriam Quinones CNP (Nurse Practitioner)       Date of Service:2/5/2019    Chief Complaint:   Chief Complaint   Patient presents with   ? Wound Check             History:    The patient was last seen by me on 1/22/2019 when Silvercel and a 2-layer wrap for compression.  He is having it changed weekly in our clinic.  Silvercel and a Tegaderm Foam Adhesive is being applied to the medial leg ulcer, which is knee his knee.  He is tolerating the adhesive on this dressing.  He denies any pain in his ulcer.    Current Outpatient Medications   Medication Sig Dispense Refill   ? acetaminophen (TYLENOL) 325 MG tablet Take 2 tablets (650 mg total) by mouth every 6 (six) hours as needed for pain.  0   ? albuterol (PROVENTIL) 2.5 mg /3 mL (0.083 %) nebulizer solution Take 3 mL (2.5 mg total) by nebulization every 6 (six) hours as needed for wheezing or shortness of breath.  0   ? aluminum-magnesium hydroxide-simethicone (MAALOX ADVANCED) 200-200-20 mg/5 mL Susp Take 30 mL by mouth every 4 (four) hours as needed.     ? aspirin 81 mg chewable tablet Chew 1 tablet (81 mg total) daily.  0   ? bacitracin 500 unit/gram ointment Apply 1 application topically daily. To chest tube sites     ? bisacodyl (DULCOLAX) 5 mg EC tablet Take 10 mg by mouth daily as needed for constipation.     ? carbamide peroxide (DEBROX) 6.5 % otic solution Administer 2 drops into both ears at bedtime as needed.            ? cetirizine (ZYRTEC) 10 MG tablet Take 10 mg by mouth daily.     ? dextromethorphan-guaifenesin (DIABETIC TUSSIN DM)  mg/5 mL Liqd Take 10 mL by mouth 4 (four) times a day as needed.     ? diphenhydrAMINE (BENADRYL) 50 MG capsule Take 50 mg by mouth 3 (three) times a day as needed for itching.     ?  emollient (VANICREAM) Crea Apply 1 application topically 3 (three) times a day as needed (dry skin).  0   ? furosemide (LASIX) 20 MG tablet Take 20 mg by mouth daily as needed (at noon, if weight increase 2lbs in 24 hours).            ? gentamicin (GARAMYCIN) 0.1 % ointment Apply 1 application topically daily as needed.     ? glipiZIDE (GLUCOTROL) 10 MG tablet Take 10 mg by mouth daily before breakfast.     ? glipiZIDE (GLUCOTROL) 10 MG tablet Take 15 mg by mouth every evening.     ? hydrocortisone 1 % ointment Apply topically 2 (two) times a day. Apply to face for skin rash 30 g 0   ? ketotifen (ZADITOR/ZYRTEC ITCHY EYES) 0.025 % (0.035 %) ophthalmic solution Administer 1 drop to both eyes 2 (two) times a day. 10 mL 0   ? Lactobacillus acidophilus 100 mg (1 billion cell) cap Take 1 capsule by mouth 2 (two) times a day. 60 capsule 0   ? MEPHYTON 5 mg tablet   0   ? miconazole (DESENEX) 2 % powder Apply 1 application topically daily as needed for itching.     ? nitroglycerin (NITROSTAT) 0.4 MG SL tablet Place 0.4 mg under the tongue every 5 (five) minutes as needed for chest pain.      ? omeprazole (PRILOSEC) 20 MG capsule Take 20 mg by mouth daily.      ? simvastatin (ZOCOR) 40 MG tablet Take 40 mg by mouth at bedtime.      ? spironolactone (ALDACTONE) 25 MG tablet Take 25 mg by mouth daily.     ? urea 10 % lotion Apply 1 application topically daily as needed.     ? warfarin (COUMADIN/JANTOVEN) 4 MG tablet Take 4 mg by mouth daily.       Current Facility-Administered Medications   Medication Dose Route Frequency Provider Last Rate Last Dose   ? lidocaine 2 % jelly (XYLOCAINE)   Topical PRN Miriam Quinones, CNP   1 application at 01/10/19 0951       Allergies   Allergen Reactions   ? Latex    ? Penicillins Rash     Childhood rxn  -- tolerated cefazolin 10/4/18       Physical Exam:    Vitals:    02/05/19 1432   BP: 128/70   Pulse: 76   Resp: 24   Temp: 98.5  F (36.9  C)    Body mass index is 27.12  kg/m .    General:  73 y.o. male in no apparent distress.    Psychiatric:  Alert and oriented x 3.  Cooperative.   Integumentary:  Skin is uniformly warm and dry.    Lower extremity edema: minimal    Photo:          Left lateral leg Ulceration(s):     Wound bed: 100% granulation.       Undermining no, Tunneling no   Wound Edge: attached   Periwound:  There is no erika wound erythema, induration, maceration, denudement fluctuance or warmth surrounding the ulcer(s).  Exudate: serosanguaneous            Quantity: moderate  Odor:  absent   Wound Shape regular       Left medial incisional  Ulceration(s):     Wound bed: 100% hypergranulation     Undermining no, Tunneling no   Wound Edge: attached   Periwound:  There is erika wound erythema, but no induration, maceration, denudement fluctuance or warmth surrounding the ulcer(s).  Exudate: serosanguaneous            Quantity: moderate  Odor:  absent   Wound Shape regular    Labs:    Lab Results   Component Value Date    SEDRATE 21 (H) 08/31/2018     Lab Results   Component Value Date    CRP 4.0 (H) 01/03/2019     No components found for: CREATINE  Lab Results   Component Value Date    BUN 25 01/04/2019     Lab Results   Component Value Date    HGBA1C 6.8 (H) 11/19/2018         Vasc Edema 7/12/2018 10/22/2018 11/12/2018 12/3/2018 1/10/2019   Right just above MTP 24.2 23.5 24 23.5 23.2   Right Ankle 24.5 24 25 24.3 23.5   Right Widest Calf 37.8 35.5 39.1 36 39   Right Thigh Up 10cm 47 41.5 46.2 45.5  46   Left - just above MTP 27.8 26.5 27 26 25.5   Left Ankle 22.2 23.0 23 23.5 23   Left Widest Calf 33.3 32.5 35.1 34.8 35.4   Left Thigh Up 10cm 44.7 40.8 40.2 44.5 46        VASC Wound 10/22/18 left medial leg vein harvest (below knee) (Active)   Pre Size Length 3.1 1/29/2019 10:00 AM   Pre Size Width 1.4 1/29/2019 10:00 AM   Pre Size Depth 0.2 1/29/2019 10:00 AM   Pre Total Sq cm 4.34 1/29/2019 10:00 AM   Post Size Length 3 1/22/2019  3:00 PM   Post Size Width 1.5 1/22/2019   3:00 PM   Post Size Depth 0.4 1/22/2019  3:00 PM   Post Total Sq cm 0.7 12/3/2018  1:00 PM   Undermined no 1/22/2019  3:00 PM   Tunneling no 1/22/2019  3:00 PM   Description 0.6@1700 1/18/2019 12:00 PM   Prodcut Used Bactroban 10/22/2018  2:00 PM       VASC Wound 11/12/18 left lateral lower leg (Active)   Pre Size Length 2.3 1/29/2019 10:00 AM   Pre Size Width 2.2 1/29/2019 10:00 AM   Pre Size Depth 0.1 1/29/2019 10:00 AM   Pre Total Sq cm 5.06 1/29/2019 10:00 AM   Post Size Length 2.5 1/22/2019  3:00 PM   Post Size Width 2.3 1/22/2019  3:00 PM   Post Size Depth 0.1 12/20/2018  9:00 AM   Undermined n 11/12/2018  2:00 PM   Tunneling n 11/12/2018  2:00 PM   Description weeping 12/3/2018  1:00 PM       VASC Wound 01/15/19 right heel (Active)   Pre Size Length 1 1/15/2019  1:00 PM   Pre Size Width 0.2 1/15/2019  1:00 PM   Pre Size Depth 0.1 1/15/2019  1:00 PM   Pre Total Sq cm 0.2 1/15/2019  1:00 PM       Wound 08/11/18 Non-pressure related ulcer Leg Right;Anterior (front) (Active)       Wound 01/04/19 Non-pressure related ulcer Leg Left (Active)       Wound 01/04/19 Non-pressure related ulcer Leg Right (Active)       Incision 10/04/18 Leg Left (Active)     Imaging:    [facility]  LOCATION: [facility] OUTPATIENT SERVICES  DATE: 12/20/2018     EXAM: RESTING ANKLE-BRACHIAL INDICES (ABIs)     INDICATION: Lower extremity pain, left leg ulcers, left transmetatarsal amputation.     COMPARISON: None.     EVELYNE FINDINGS:   SEGMENTAL BP  RIGHT (mmHg)  Brachial: 134  High Thigh: 190; Index 1.36  Low Thigh: 173; Index 1.24  Calf: >254; Index -NC-  Ankle (PT): 11.; Index 0.79  Ankle (DP): 138; Index 0.99  Digit: 58; Index 0.41     LEFT (mmHg)  Brachial: 140  High Thigh: 154; Index 1.10  Low Thigh: 155; Index 1.11  Calf: 115; Index 0.82  Ankle (PT): 130; Index 0.93  Ankle (DP): 156; Index 1.11     IMPRESSION:   1. RIGHT LOWER EXTREMITY: Resting ankle-brachial index of 0.99 is likely artifactual secondary to vessel  calcification. Great toe pressure of 58 mmHg is adequate for wound healing.     2. LEFT LOWER EXTREMITY: Resting ankle-brachial index of 0.93 is likely artifactual secondary to vessel calcification. Status post left transmetatarsal amputation.     3. Given development of a new wound, further evaluation with a bilateral lower extremity ultrasound and/or CT angiogram with bilateral lower extremity runoff is suggested.         Assessment:  1. Ulcer of left lower extremity with fat layer exposed (H)     2. Leg ulcer, left, limited to breakdown of skin (H)     3. Acquired lymphedema of lower extremity     4. Allergic contact dermatitis due to adhesives     5. PAD (peripheral artery disease) (H)     6. Non-insulin dependent type 2 diabetes mellitus (H)         A new wound was identified today: No      Plan:    1.  Debridement of the Left lateral leg ulcer was recommended.  After consent was obtained and topical 2% Xylocaine was applied under clean conditions, and using a #15 blade,the devitalized dermal tissue was debrided for a total square centimeters of 6.0.  Following debridement, there was a decrease in the nonviable tissue. The patient tolerated the procedure without any difficulty.     Excisional Debridement of the Left medial leg ulcer was recommended and after consent was obtained and topical 2% Xylocaine was applied, under clean conditions, using a #15 scalpel, the devitalized subcutaneous  tissue in the ulcer base and at the ulcer margins were sharply excised for a total sq. cm of 4.03.    Following excision, there was minimal bleeding tissue, which was easily controlled and a decrease in the nonviable tissue.  The patient tolerated the procedure without difficulty.     2. Venous and contact dermatitis, resolved.      3.  Venous insufficiency, stable    4.  Left lateral leg ulcer, improving    5.  Left Medial leg ulcer, improving    6.  Edema, stable    7.  Diabetes, on Metformin.  6.6 A1c on 11/1/2017.      8.   Treatment:  For the medial leg ulcer, will apply a Tegaderm foam adhesive.  For the lateral left leg ulcer, will apply Endoform and cover with an ABD.  A 2-layer wrap  Will be applied for compression and changed weekly by nurses in our clinic.    9.  Patient will follow up with a nurse visit in one week and me in 2 weeks  for further evaluation and response to the treatment.      Miriam Quinones, APRN, CNP,  [facility]  [phone]

## 2021-06-27 NOTE — PROGRESS NOTES
Progress Notes by Miriam Quinones CNP at 5/2/2019  1:40 PM     Author: Miriam Quinones CNP Service: -- Author Type: Nurse Practitioner    Filed: 5/2/2019  3:14 PM Encounter Date: 5/2/2019 Status: Signed    : Miriam Quinones CNP (Nurse Practitioner)       Date of Service:5/2/2019    Chief Complaint:   Chief Complaint   Patient presents with   ? Follow-up     2wk f/u. Left lateral lower leg ulcer: endoform, xeroform, abd pad, roll gauze, 2 layer.   ? Wound Check       History:    The patient was last seen by me on 4/18/2019 when Endoform and xeroform was started and a 2-layer wrap continued. He got his 2-layer wrap wet in the shower about 5 days ago and decided to cut it off because it happened over the weekend.  Unfortunately, he cut himself in two spots. His original ulcer has resolved.  He is denying pain in his ulcers and his swelling is controlled.    Current Outpatient Medications   Medication Sig Dispense Refill   ? acetaminophen (TYLENOL) 325 MG tablet Take 2 tablets (650 mg total) by mouth every 6 (six) hours as needed for pain.  0   ? albuterol (PROVENTIL) 2.5 mg /3 mL (0.083 %) nebulizer solution Take 3 mL (2.5 mg total) by nebulization every 6 (six) hours as needed for wheezing or shortness of breath.  0   ? aluminum-magnesium hydroxide-simethicone (MAALOX ADVANCED) 200-200-20 mg/5 mL Susp Take 30 mL by mouth every 4 (four) hours as needed.     ? aspirin 81 mg chewable tablet Chew 1 tablet (81 mg total) daily.  0   ? bacitracin 500 unit/gram ointment Apply 1 application topically daily. To chest tube sites     ? bisacodyl (DULCOLAX) 5 mg EC tablet Take 10 mg by mouth daily as needed for constipation.     ? carbamide peroxide (DEBROX) 6.5 % otic solution Administer 2 drops into both ears at bedtime as needed.            ? cetirizine (ZYRTEC) 10 MG tablet Take 10 mg by mouth daily.     ? dextromethorphan-guaifenesin (DIABETIC TUSSIN DM)  mg/5 mL Liqd Take 10 mL by mouth 4 (four)  times a day as needed.     ? diphenhydrAMINE (BENADRYL) 50 MG capsule Take 50 mg by mouth 3 (three) times a day as needed for itching.     ? emollient (VANICREAM) Crea Apply 1 application topically 3 (three) times a day as needed (dry skin).  0   ? furosemide (LASIX) 20 MG tablet Take 20 mg by mouth daily as needed (at noon, if weight increase 2lbs in 24 hours).            ? gentamicin (GARAMYCIN) 0.1 % ointment Apply 1 application topically daily as needed.     ? glipiZIDE (GLUCOTROL) 10 MG tablet Take 10 mg by mouth daily before breakfast.     ? glipiZIDE (GLUCOTROL) 10 MG tablet Take 15 mg by mouth every evening.     ? hydrocortisone 1 % ointment Apply topically 2 (two) times a day. Apply to face for skin rash 30 g 0   ? ketotifen (ZADITOR/ZYRTEC ITCHY EYES) 0.025 % (0.035 %) ophthalmic solution Administer 1 drop to both eyes 2 (two) times a day. 10 mL 0   ? Lactobacillus acidophilus 100 mg (1 billion cell) cap Take 1 capsule by mouth 2 (two) times a day. 60 capsule 0   ? MEPHYTON 5 mg tablet   0   ? miconazole (DESENEX) 2 % powder Apply 1 application topically daily as needed for itching.     ? nitroglycerin (NITROSTAT) 0.4 MG SL tablet Place 0.4 mg under the tongue every 5 (five) minutes as needed for chest pain.      ? omeprazole (PRILOSEC) 20 MG capsule Take 20 mg by mouth daily.      ? simvastatin (ZOCOR) 40 MG tablet Take 40 mg by mouth at bedtime.      ? spironolactone (ALDACTONE) 25 MG tablet Take 25 mg by mouth daily.     ? urea 10 % lotion Apply 1 application topically daily as needed.     ? warfarin (COUMADIN/JANTOVEN) 4 MG tablet Take 4 mg by mouth daily.       Current Facility-Administered Medications   Medication Dose Route Frequency Provider Last Rate Last Dose   ? lidocaine 2 % jelly (XYLOCAINE)   Topical PRN Miriam Quinones, CNP   1 application at 05/02/19 1400       Allergies   Allergen Reactions   ? Latex    ? Penicillins Rash     Childhood rxn  -- tolerated cefazolin 10/4/18        Physical Exam:    Vitals:    05/02/19 1350   BP: 128/68   Pulse: 96   Resp: 20   Temp: 98.3  F (36.8  C)    Body mass index is 28.39 kg/m .    General:  74 y.o. male in no apparent distress.    Psychiatric:  Alert and oriented x 3.  Cooperative.   Integumentary:  Skin is uniformly warm and dry.    Lower extremity edema: minimal    Photo:        Left leg Ulceration(s):     Wound bed: 100% granulation   Undermining no, Tunneling no   Wound Edge: attached   Periwound:  There is no erika wound erythema, induration, maceration, denudement fluctuance or warmth surrounding the ulcer(s).  Exudate: serosanguaneous            Quantity: minimal  Odor:  absent   Wound Shape regular       Labs:    Lab Results   Component Value Date    SEDRATE 21 (H) 08/31/2018     Lab Results   Component Value Date    CRP 4.0 (H) 01/03/2019     No components found for: CREATINE  Lab Results   Component Value Date    BUN 25 01/04/2019     Lab Results   Component Value Date    HGBA1C 10.2 (H) 03/05/2019         Vasc Edema 12/3/2018 1/10/2019 2/28/2019 3/15/2019 4/18/2019   Right just above MTP 23.5 23.2 - 23.8 -   Right Ankle 24.3 23.5 - 24.5 -   Right Widest Calf 36 39 - 36.8 -   Right Thigh Up 10cm 45.5  46 - 47 -   Left - just above MTP 26 25.5 27.5 26.5 25   Left Ankle 23.5 23 22.1 21.7 21.5   Left Widest Calf 34.8 35.4 31.1 31.5 32   Left Thigh Up 10cm 44.5 46  45.5 46.2 45.5       VASC Wound 10/22/18 left medial leg vein harvest (below knee) (Active)   Pre Size Length 0.5 4/11/2019  1:00 PM   Pre Size Width 0.4 4/11/2019  1:00 PM   Pre Size Depth 0.1 4/11/2019  1:00 PM   Pre Total Sq cm 0.2 4/11/2019  1:00 PM   Post Size Length 0 4/5/2019 12:00 PM   Post Size Width 0 4/5/2019 12:00 PM   Post Size Depth 0 4/5/2019 12:00 PM   Post Total Sq cm 0.4 3/15/2019 12:00 PM   Undermined no 4/11/2019  1:00 PM   Tunneling no 4/11/2019  1:00 PM   Description scab 5/2/2019  1:55 PM   Prodcut Used Bactroban 10/22/2018  2:00 PM       VASC Wound 11/12/18  left lateral lower leg (Active)   Pre Size Length 0.9 4/18/2019  2:00 PM   Pre Size Width 0.9 4/18/2019  2:00 PM   Pre Size Depth 0.1 4/18/2019  2:00 PM   Pre Total Sq cm 0.81 4/18/2019  2:00 PM   Post Size Length 0.5 4/18/2019  2:00 PM   Post Size Width 0.8 4/18/2019  2:00 PM   Post Size Depth 0.01 3/15/2019 12:00 PM   Post Total Sq cm 0 4/5/2019 12:00 PM   Undermined no 4/11/2019  1:00 PM   Tunneling no 4/11/2019  1:00 PM   Description scabbed 4/25/2019  1:00 PM   Prodcut Used ABD Pad 4/25/2019  1:00 PM       VASC Wound 04/18/19 left lateral lower leg superior (Active)   Pre Size Length 0 5/2/2019  1:55 PM   Pre Size Width 0 5/2/2019  1:55 PM   Pre Size Depth 0 5/2/2019  1:55 PM   Pre Total Sq cm 0 5/2/2019  1:55 PM   Post Size Length 0.5 4/18/2019  2:00 PM   Post Size Width 0.5 4/18/2019  2:00 PM   Undermined n 4/25/2019  1:00 PM   Tunneling n 4/25/2019  1:00 PM   Description closed 5/2/2019  1:55 PM       VASC Wound 05/02/19 left mid shin (Active)   Pre Size Length 0.7 5/2/2019  1:55 PM   Pre Size Width 0.7 5/2/2019  1:55 PM   Pre Size Depth 0.1 5/2/2019  1:55 PM   Pre Total Sq cm 0.49 5/2/2019  1:55 PM       VASC Wound 05/02/19 left medial ankle (Active)   Pre Size Length 0.9 5/2/2019  1:55 PM   Pre Size Width 1.7 5/2/2019  1:55 PM   Pre Size Depth 0.1 5/2/2019  1:55 PM   Pre Total Sq cm 1.53 5/2/2019  1:55 PM       VASC Wound 05/02/19 left middle medial leg (Active)   Pre Size Length 0.6 5/2/2019  1:55 PM   Pre Size Width 0.6 5/2/2019  1:55 PM   Pre Size Depth 0.1 5/2/2019  1:55 PM   Pre Total Sq cm 0.36 5/2/2019  1:55 PM       Wound 08/11/18 Non-pressure related ulcer Leg Right;Anterior (front) (Active)       Wound 01/04/19 Non-pressure related ulcer Leg Left (Active)       Wound 01/04/19 Non-pressure related ulcer Leg Right (Active)       Incision 10/04/18 Leg Left (Active)     Imaging:    [facility] RADIOLOGY  LOCATION: [facility] OUTPATIENT SERVICES  DATE: 12/20/2018     EXAM: RESTING ANKLE-BRACHIAL  INDICES (ABIs)     INDICATION: Lower extremity pain, left leg ulcers, left transmetatarsal amputation.     COMPARISON: None.     EVELYNE FINDINGS:   SEGMENTAL BP  RIGHT (mmHg)  Brachial: 134  High Thigh: 190; Index 1.36  Low Thigh: 173; Index 1.24  Calf: >254; Index -NC-  Ankle (PT): 11.; Index 0.79  Ankle (DP): 138; Index 0.99  Digit: 58; Index 0.41     LEFT (mmHg)  Brachial: 140  High Thigh: 154; Index 1.10  Low Thigh: 155; Index 1.11  Calf: 115; Index 0.82  Ankle (PT): 130; Index 0.93  Ankle (DP): 156; Index 1.11     IMPRESSION:   1. RIGHT LOWER EXTREMITY: Resting ankle-brachial index of 0.99 is likely artifactual secondary to vessel calcification. Great toe pressure of 58 mmHg is adequate for wound healing.     2. LEFT LOWER EXTREMITY: Resting ankle-brachial index of 0.93 is likely artifactual secondary to vessel calcification. Status post left transmetatarsal amputation.     3. Given development of a new wound, further evaluation with a bilateral lower extremity ultrasound and/or CT angiogram with bilateral lower extremity runoff is suggested.         Assessment:  1. Leg ulcer, left, limited to breakdown of skin (H)     2. Acquired lymphedema of lower extremity     3. Non-insulin dependent type 2 diabetes mellitus (H)     4. Venous hypertension, chronic, bilateral     5. Allergic contact dermatitis due to adhesives         A new wound was identified today: yes, left leg      Plan:    1.  Debridement of the Left lateral leg ulcer was recommended.  After consent was obtained and topical 2% Xylocaine was applied under clean conditions, and using a #15 blade,the devitalized dermal tissue was debrided for a total square centimeters of 2.02.  Following debridement, there was a decrease in the nonviable tissue. The patient tolerated the procedure without any difficulty.     2. Venous and contact dermatitis, resolved.      3.  Venous insufficiency, stable    4.  Left leg ulcer, new ulcers secondary to trauma.  No signs of  infection    5.  Edema, stable    6.  Diabetes, on Metformin.  6.6 A1c on 11/1/2017.      7.  Treatment:  Will apply Endoform and cover with xeroform and an ABD.  A 2-layer wrap will be applied for compression and changed weekly by nurses in our clinic.    8.  Patient will follow up with a nurse visit weekly.   I will transfer care to Heather Robertson, KIMBERLY, CNP, CWN following my resignation from [facility].  He will see her in one month.      Miriam Quinones, KEVIN, CNP,  [facility]  [phone]

## 2021-06-27 NOTE — PROGRESS NOTES
Progress Notes by Indira Freed LPN at 5/16/2019 12:40 PM     Author: Indira Freed LPN Service: -- Author Type: Licensed Nurse    Filed: 5/16/2019  1:19 PM Encounter Date: 5/16/2019 Status: Signed    : Indira Freed LPN (Licensed Nurse)       Nurse Visit        Chief Complaint: Patient presents to clinic for assessment and treatment of their ulcer and and swelling     Dressing on Arrival: 2 layer on left leg    Patient came in today for dressing change, and possible transition to double dermafit or 2 layer re-wrap per order from Miriam Quinones CNP. Patient rated pain 0 out of 10. Removed wrap and cleansed skin with Remedy skin cleanser. Applied lotion to intact skin. Patient's wounds still look healed. Transitioned Evre into double dermafit and instructed him when to put on and how to take care of them.     Allergies:   Allergies   Allergen Reactions   ? Latex    ? Penicillins Rash     Childhood rxn  -- tolerated cefazolin 10/4/18       Medications:   Current Outpatient Medications:   ?  acetaminophen (TYLENOL) 325 MG tablet, Take 2 tablets (650 mg total) by mouth every 6 (six) hours as needed for pain., Disp: , Rfl: 0  ?  albuterol (PROVENTIL) 2.5 mg /3 mL (0.083 %) nebulizer solution, Take 3 mL (2.5 mg total) by nebulization every 6 (six) hours as needed for wheezing or shortness of breath., Disp: , Rfl: 0  ?  aluminum-magnesium hydroxide-simethicone (MAALOX ADVANCED) 200-200-20 mg/5 mL Susp, Take 30 mL by mouth every 4 (four) hours as needed., Disp: , Rfl:   ?  aspirin 81 mg chewable tablet, Chew 1 tablet (81 mg total) daily., Disp: , Rfl: 0  ?  bacitracin 500 unit/gram ointment, Apply 1 application topically daily. To chest tube sites, Disp: , Rfl:   ?  bisacodyl (DULCOLAX) 5 mg EC tablet, Take 10 mg by mouth daily as needed for constipation., Disp: , Rfl:   ?  carbamide peroxide (DEBROX) 6.5 % otic solution, Administer 2 drops into both ears at bedtime as needed.    , Disp: , Rfl:   ?   cetirizine (ZYRTEC) 10 MG tablet, Take 10 mg by mouth daily., Disp: , Rfl:   ?  dextromethorphan-guaifenesin (DIABETIC TUSSIN DM)  mg/5 mL Liqd, Take 10 mL by mouth 4 (four) times a day as needed., Disp: , Rfl:   ?  diphenhydrAMINE (BENADRYL) 50 MG capsule, Take 50 mg by mouth 3 (three) times a day as needed for itching., Disp: , Rfl:   ?  emollient (VANICREAM) Crea, Apply 1 application topically 3 (three) times a day as needed (dry skin)., Disp: , Rfl: 0  ?  furosemide (LASIX) 20 MG tablet, Take 20 mg by mouth daily as needed (at noon, if weight increase 2lbs in 24 hours).    , Disp: , Rfl:   ?  gentamicin (GARAMYCIN) 0.1 % ointment, Apply 1 application topically daily as needed., Disp: , Rfl:   ?  glipiZIDE (GLUCOTROL) 10 MG tablet, Take 10 mg by mouth daily before breakfast., Disp: , Rfl:   ?  glipiZIDE (GLUCOTROL) 10 MG tablet, Take 15 mg by mouth every evening., Disp: , Rfl:   ?  hydrocortisone 1 % ointment, Apply topically 2 (two) times a day. Apply to face for skin rash, Disp: 30 g, Rfl: 0  ?  ketotifen (ZADITOR/ZYRTEC ITCHY EYES) 0.025 % (0.035 %) ophthalmic solution, Administer 1 drop to both eyes 2 (two) times a day., Disp: 10 mL, Rfl: 0  ?  Lactobacillus acidophilus 100 mg (1 billion cell) cap, Take 1 capsule by mouth 2 (two) times a day., Disp: 60 capsule, Rfl: 0  ?  MEPHYTON 5 mg tablet, , Disp: , Rfl: 0  ?  miconazole (DESENEX) 2 % powder, Apply 1 application topically daily as needed for itching., Disp: , Rfl:   ?  nitroglycerin (NITROSTAT) 0.4 MG SL tablet, Place 0.4 mg under the tongue every 5 (five) minutes as needed for chest pain. , Disp: , Rfl:   ?  omeprazole (PRILOSEC) 20 MG capsule, Take 20 mg by mouth daily. , Disp: , Rfl:   ?  simvastatin (ZOCOR) 40 MG tablet, Take 40 mg by mouth at bedtime. , Disp: , Rfl:   ?  spironolactone (ALDACTONE) 25 MG tablet, Take 25 mg by mouth daily., Disp: , Rfl:   ?  urea 10 % lotion, Apply 1 application topically daily as needed., Disp: , Rfl:   ?   warfarin (COUMADIN/JANTOVEN) 4 MG tablet, Take 4 mg by mouth daily., Disp: , Rfl:     Current Facility-Administered Medications:   ?  lidocaine 2 % jelly (XYLOCAINE), , Topical, PRN, Stacie, Miriam Johnson, CNP, 1 application at 05/02/19 1400    Vital Signs: /82   Pulse 92   Temp 98.2  F (36.8  C) (Oral)   Resp 16       Assessment:    General:  Patient presents to clinic in no apparent distress.  Psychiatric:  Alert and oriented .   Lower extremity:  edema is present.    Integumentary:  Skin is uniformly warm, dry and pink.    Wound size:   VASC Wound 10/22/18 left medial leg vein harvest (below knee) (Active)   Pre Size Length 0.5 4/11/2019  1:00 PM   Pre Size Width 0.4 4/11/2019  1:00 PM   Pre Size Depth 0.1 4/11/2019  1:00 PM   Pre Total Sq cm 0.2 4/11/2019  1:00 PM   Post Size Length 0 4/5/2019 12:00 PM   Post Size Width 0 4/5/2019 12:00 PM   Post Size Depth 0 4/5/2019 12:00 PM   Post Total Sq cm 0.4 3/15/2019 12:00 PM   Undermined no 4/11/2019  1:00 PM   Tunneling no 4/11/2019  1:00 PM   Description scab 5/16/2019 12:00 PM   Prodcut Used Bactroban 10/22/2018  2:00 PM       VASC Wound 05/02/19 left mid shin (Active)   Pre Size Length 0 5/16/2019 12:00 PM   Pre Size Width 0 5/16/2019 12:00 PM   Pre Size Depth 0 5/16/2019 12:00 PM   Pre Total Sq cm 0 5/9/2019  1:00 PM       VASC Wound 05/02/19 left medial ankle (Active)   Pre Size Length 0 5/16/2019 12:00 PM   Pre Size Width 0 5/16/2019 12:00 PM   Pre Size Depth 0 5/16/2019 12:00 PM   Pre Total Sq cm 0 5/16/2019 12:00 PM       VASC Wound 05/02/19 left middle medial leg (Active)   Pre Size Length 0 5/9/2019  1:00 PM   Pre Size Width 0 5/9/2019  1:00 PM   Pre Size Depth 0 5/9/2019  1:00 PM   Pre Total Sq cm 0 5/9/2019  1:00 PM       Wound 08/11/18 Non-pressure related ulcer Leg Right;Anterior (front) (Active)       Wound 01/04/19 Non-pressure related ulcer Leg Left (Active)       Wound 01/04/19 Non-pressure related ulcer Leg Right (Active)       Incision  10/04/18 Leg Left (Active)      Undermining is not present.    The periwoundskin is WNL      Vasc Edema 12/3/2018 1/10/2019 2/28/2019 3/15/2019 4/18/2019   Right just above MTP 23.5 23.2 - 23.8 -   Right Ankle 24.3 23.5 - 24.5 -   Right Widest Calf 36 39 - 36.8 -   Right Thigh Up 10cm 45.5  46 - 47 -   Left - just above MTP 26 25.5 27.5 26.5 25   Left Ankle 23.5 23 22.1 21.7 21.5   Left Widest Calf 34.8 35.4 31.1 31.5 32   Left Thigh Up 10cm 44.5 46  45.5 46.2 45.5       Plan:         1. Patient will follow up on 5/28/19         2. Treatment provided will include irrigation and dressings to promote autolytic debridement and will be as listed below     Cleansed with: Remedy skin cleanser    Protected skin with: Remedy Skin Repair    Dressings Applied: NA    Compression Applied to the Left Leg: Double Dermafit    Compression Applied to the Right Leg: None    Offloading applied: None    Trial Products: no  Provider notified regarding concerns: no  Treatment Changes: yes    Educational Barriers: none  Taught Regarding: Acitivity, Compliance, Compression, Dressing and Hygiene  Teaching Method: Karsten Samson and CHELSEY sheet

## 2021-06-27 NOTE — PROGRESS NOTES
Progress Notes by Jeferson Valenzuela LPN at 1/29/2019 10:40 AM     Author: Jeferson Valenzuela LPN Service: -- Author Type: Licensed Nurse    Filed: 1/29/2019 10:51 AM Encounter Date: 1/29/2019 Status: Signed    : Jeferson Valenzuela LPN (Licensed Nurse)                         Nurse Visit    Chief Complaint: Patient presents to clinic for assement, and treatment of their ulcer and and swelling    Dressing on Arrival 2 layer CDI. Silvercel,ABD roll gauze covering wounds.    Allergies:   Allergies   Allergen Reactions   ? Latex    ? Penicillins Rash     Childhood rxn  -- tolerated cefazolin 10/4/18       Medications:   Current Outpatient Medications:   ?  acetaminophen (TYLENOL) 325 MG tablet, Take 2 tablets (650 mg total) by mouth every 6 (six) hours as needed for pain., Disp: , Rfl: 0  ?  albuterol (PROVENTIL) 2.5 mg /3 mL (0.083 %) nebulizer solution, Take 3 mL (2.5 mg total) by nebulization every 6 (six) hours as needed for wheezing or shortness of breath., Disp: , Rfl: 0  ?  aluminum-magnesium hydroxide-simethicone (MAALOX ADVANCED) 200-200-20 mg/5 mL Susp, Take 30 mL by mouth every 4 (four) hours as needed., Disp: , Rfl:   ?  aspirin 81 mg chewable tablet, Chew 1 tablet (81 mg total) daily., Disp: , Rfl: 0  ?  bacitracin 500 unit/gram ointment, Apply 1 application topically daily. To chest tube sites, Disp: , Rfl:   ?  bisacodyl (DULCOLAX) 5 mg EC tablet, Take 10 mg by mouth daily as needed for constipation., Disp: , Rfl:   ?  carbamide peroxide (DEBROX) 6.5 % otic solution, Administer 2 drops into both ears at bedtime as needed.    , Disp: , Rfl:   ?  cetirizine (ZYRTEC) 10 MG tablet, Take 10 mg by mouth daily., Disp: , Rfl:   ?  clindamycin (CLEOCIN) 300 MG capsule, Take 1 capsule (300 mg total) by mouth 3 (three) times a day for 10 days., Disp: 30 capsule, Rfl: 0  ?  dextromethorphan-guaifenesin (DIABETIC TUSSIN DM)  mg/5 mL Liqd, Take 10 mL by mouth 4 (four) times a day as needed., Disp: , Rfl:   ?   diphenhydrAMINE (BENADRYL) 50 MG capsule, Take 50 mg by mouth 3 (three) times a day as needed for itching., Disp: , Rfl:   ?  emollient (VANICREAM) Crea, Apply 1 application topically 3 (three) times a day as needed (dry skin)., Disp: , Rfl: 0  ?  furosemide (LASIX) 20 MG tablet, Take 20 mg by mouth daily as needed (at noon, if weight increase 2lbs in 24 hours).    , Disp: , Rfl:   ?  gentamicin (GARAMYCIN) 0.1 % ointment, Apply 1 application topically daily as needed., Disp: , Rfl:   ?  glipiZIDE (GLUCOTROL) 10 MG tablet, Take 10 mg by mouth daily before breakfast., Disp: , Rfl:   ?  glipiZIDE (GLUCOTROL) 10 MG tablet, Take 15 mg by mouth every evening., Disp: , Rfl:   ?  hydrocortisone 1 % ointment, Apply topically 2 (two) times a day. Apply to face for skin rash, Disp: 30 g, Rfl: 0  ?  ketotifen (ZADITOR/ZYRTEC ITCHY EYES) 0.025 % (0.035 %) ophthalmic solution, Administer 1 drop to both eyes 2 (two) times a day., Disp: 10 mL, Rfl: 0  ?  Lactobacillus acidophilus 100 mg (1 billion cell) cap, Take 1 capsule by mouth 2 (two) times a day., Disp: 60 capsule, Rfl: 0  ?  MEPHYTON 5 mg tablet, , Disp: , Rfl: 0  ?  miconazole (DESENEX) 2 % powder, Apply 1 application topically daily as needed for itching., Disp: , Rfl:   ?  nitroglycerin (NITROSTAT) 0.4 MG SL tablet, Place 0.4 mg under the tongue every 5 (five) minutes as needed for chest pain. , Disp: , Rfl:   ?  omeprazole (PRILOSEC) 20 MG capsule, Take 20 mg by mouth daily. , Disp: , Rfl:   ?  simvastatin (ZOCOR) 40 MG tablet, Take 40 mg by mouth at bedtime. , Disp: , Rfl:   ?  spironolactone (ALDACTONE) 25 MG tablet, Take 25 mg by mouth daily., Disp: , Rfl:   ?  triamcinolone (KENALOG) 0.1 % cream, Apply to your legs each leg at bedtime 7 days. (Patient taking differently: Apply 1 application topically 2 (two) times a day. To lower legs   ), Disp: 30 g, Rfl: 2  ?  urea 10 % lotion, Apply 1 application topically daily as needed., Disp: , Rfl:   ?  warfarin  (COUMADIN/JANTOVEN) 4 MG tablet, Take 4 mg by mouth daily., Disp: , Rfl:     Current Facility-Administered Medications:   ?  lidocaine 2 % jelly (XYLOCAINE), , Topical, PRN, Stacie, Miriam Johnson, CNP, 1 application at 01/10/19 0951    Vital Signs: /70 (Patient Site: Left Arm, Patient Position: Sitting, Cuff Size: Adult Regular)   Pulse 64   Temp 98  F (36.7  C)   Resp 18   Ht 6' (1.829 m)   Wt 198 lb (89.8 kg)   BMI 26.85 kg/m        Assessment:    General:  Patient presents to clinic in no apparent distress.  Psychiatric:  Alert and oriented x3.   Lower extremity:  edema is present.    Integumentary:  Skin is uniformly warm, dry and pink.    Wound size:   VASC Wound 10/22/18 left medial leg vein harvest (below knee) (Active)   Pre Size Length 3.1 1/29/2019 10:00 AM   Pre Size Width 1.4 1/29/2019 10:00 AM   Pre Size Depth 0.2 1/29/2019 10:00 AM   Pre Total Sq cm 4.34 1/29/2019 10:00 AM   Post Size Length 3 1/22/2019  3:00 PM   Post Size Width 1.5 1/22/2019  3:00 PM   Post Size Depth 0.4 1/22/2019  3:00 PM   Post Total Sq cm 0.7 12/3/2018  1:00 PM   Undermined no 1/22/2019  3:00 PM   Tunneling no 1/22/2019  3:00 PM   Description 0.6@1700 1/18/2019 12:00 PM   Prodcut Used Bactroban 10/22/2018  2:00 PM       VASC Wound 11/12/18 left lateral lower leg (Active)   Pre Size Length 2.3 1/29/2019 10:00 AM   Pre Size Width 2.2 1/29/2019 10:00 AM   Pre Size Depth 0.1 1/29/2019 10:00 AM   Pre Total Sq cm 5.06 1/29/2019 10:00 AM   Post Size Length 2.5 1/22/2019  3:00 PM   Post Size Width 2.3 1/22/2019  3:00 PM   Post Size Depth 0.1 12/20/2018  9:00 AM   Undermined n 11/12/2018  2:00 PM   Tunneling n 11/12/2018  2:00 PM   Description weeping 12/3/2018  1:00 PM       VASC Wound 01/15/19 right heel (Active)   Pre Size Length 1 1/15/2019  1:00 PM   Pre Size Width 0.2 1/15/2019  1:00 PM   Pre Size Depth 0.1 1/15/2019  1:00 PM   Pre Total Sq cm 0.2 1/15/2019  1:00 PM       Wound 08/11/18 Non-pressure related ulcer Leg  "Right;Anterior (front) (Active)       Wound 19 Non-pressure related ulcer Leg Left (Active)       Wound 19 Non-pressure related ulcer Leg Right (Active)       Incision 10/04/18 Leg Left (Active)      Undermining is not present.    The periwoundskin is WNL      Plan:         1. Patient will f/u in 1 wk        2. Treatment provided will include irrigation and dressings to promote autolytic debridement and will be as listed below     Cleansed with: Wound Cleanser    Protected skin with: Remedy Skin Repair    Dressings Applied: Silver alginate and ABD's\" \"Packing Strips    Compression Applied to the Left Le-Layer Coban    Compression Applied to the Right Leg: Tubular compression    Offloading applied: None    Trial Products: no  Provider notified regarding concerns: no  Treatment Changes: no    Educational Barriers: none  Taught Regarding: Acitivity, Compliance, Compression and Dressing  Teaching Method: Exaplanation and DC sheet           "

## 2021-06-27 NOTE — PROGRESS NOTES
Progress Notes by Miriam Quinones CNP at 1/10/2019 10:20 AM     Author: Miriam Quinones CNP Service: -- Author Type: Nurse Practitioner    Filed: 1/10/2019  1:12 PM Encounter Date: 1/10/2019 Status: Signed    : Miriam Quinones CNP (Nurse Practitioner)       Date of Service:1/10/2019    Chief Complaint:   Chief Complaint   Patient presents with   ? Wound Check       History:    The patient was last seen by me on 12/20//2018 when melgisorb and an   ABD was start for his newly developed wounds.  Dermafit was applied for compression.  He was admitted to the hospital on 1/3/2019 for a skin rash and cellulitis.  He was given IV antibiotic and discharged on 1/6/2019.  He was instructed to start taking Doxycycline, but his sister is unaware that he was prescribed this drug and Ever is not sure if he is taking it.  Home care was to be started, but he did not qualify because he is attending outpatient cardiac rehab.  He states that he is doing his own dressing changes with dry gauze and wrapping his leg with comprilan.  He denies pain in his leg.       Current Outpatient Medications   Medication Sig Dispense Refill   ? acetaminophen (TYLENOL) 325 MG tablet Take 2 tablets (650 mg total) by mouth every 6 (six) hours as needed for pain.  0   ? albuterol (PROVENTIL) 2.5 mg /3 mL (0.083 %) nebulizer solution Take 3 mL (2.5 mg total) by nebulization every 6 (six) hours as needed for wheezing or shortness of breath.  0   ? aluminum-magnesium hydroxide-simethicone (MAALOX ADVANCED) 200-200-20 mg/5 mL Susp Take 30 mL by mouth every 4 (four) hours as needed.     ? aspirin 81 mg chewable tablet Chew 1 tablet (81 mg total) daily.  0   ? bacitracin 500 unit/gram ointment Apply 1 application topically daily. To chest tube sites     ? bisacodyl (DULCOLAX) 5 mg EC tablet Take 10 mg by mouth daily as needed for constipation.     ? carbamide peroxide (DEBROX) 6.5 % otic solution Administer 2 drops into both ears at  bedtime as needed.            ? cetirizine (ZYRTEC) 10 MG tablet Take 10 mg by mouth daily.     ? dextromethorphan-guaifenesin (DIABETIC TUSSIN DM)  mg/5 mL Liqd Take 10 mL by mouth 4 (four) times a day as needed.     ? diphenhydrAMINE (BENADRYL) 50 MG capsule Take 50 mg by mouth 3 (three) times a day as needed for itching.     ? doxycycline (VIBRA-TABS) 100 MG tablet Take 1 tablet (100 mg total) by mouth 2 (two) times a day for 7 days. 14 tablet 0   ? emollient (VANICREAM) Crea Apply 1 application topically 3 (three) times a day as needed (dry skin).  0   ? furosemide (LASIX) 20 MG tablet Take 20 mg by mouth daily as needed (at noon, if weight increase 2lbs in 24 hours).            ? gentamicin (GARAMYCIN) 0.1 % ointment Apply 1 application topically daily as needed.     ? glipiZIDE (GLUCOTROL) 10 MG tablet Take 10 mg by mouth daily before breakfast.     ? glipiZIDE (GLUCOTROL) 10 MG tablet Take 15 mg by mouth every evening.     ? hydrocortisone 1 % ointment Apply topically 2 (two) times a day. Apply to face for skin rash 30 g 0   ? ketotifen (ZADITOR/ZYRTEC ITCHY EYES) 0.025 % (0.035 %) ophthalmic solution Administer 1 drop to both eyes 2 (two) times a day. 10 mL 0   ? Lactobacillus acidophilus 100 mg (1 billion cell) cap Take 1 capsule by mouth 2 (two) times a day. 60 capsule 0   ? metoprolol tartrate (LOPRESSOR) 25 MG tablet Take 1 tablet (25 mg total) by mouth 2 (two) times a day. 60 tablet 2   ? miconazole (DESENEX) 2 % powder Apply 1 application topically daily as needed for itching.     ? nitroglycerin (NITROSTAT) 0.4 MG SL tablet Place 0.4 mg under the tongue every 5 (five) minutes as needed for chest pain.      ? omeprazole (PRILOSEC) 20 MG capsule Take 20 mg by mouth daily.      ? simvastatin (ZOCOR) 40 MG tablet Take 40 mg by mouth at bedtime.      ? spironolactone (ALDACTONE) 25 MG tablet Take 25 mg by mouth daily.     ? triamcinolone (KENALOG) 0.1 % cream Apply to your legs each leg at bedtime 7  days. (Patient taking differently: Apply 1 application topically 2 (two) times a day. To lower legs      ) 30 g 2   ? urea 10 % lotion Apply 1 application topically daily as needed.     ? warfarin (COUMADIN/JANTOVEN) 4 MG tablet Take 4 mg by mouth daily.       Current Facility-Administered Medications   Medication Dose Route Frequency Provider Last Rate Last Dose   ? lidocaine 2 % jelly (XYLOCAINE)   Topical PRN Stacie, Miriam Johnson, CNP   1 application at 01/10/19 0951       Allergies   Allergen Reactions   ? Latex    ? Penicillins Rash     Childhood rxn  -- tolerated cefazolin 10/4/18       Physical Exam:    Vitals:    01/10/19 0936   BP: 122/70   Pulse: (!) 56   Resp: 18   Temp: 98.3  F (36.8  C)    There is no height or weight on file to calculate BMI.    General:  73 y.o. male in no apparent distress.    Psychiatric:  Alert and oriented x 3.  Cooperative.   Integumentary:  Skin is uniformly warm and dry.    Lower extremity edema: minimal              Left lateral leg Ulceration(s):     Wound bed: %100 adherent slough          Undermining no, Tunneling no   Wound Edge: attached   Periwound:  There is no erika wound erythema, induration, maceration, denudement fluctuance or warmth surrounding the ulcer(s).  Exudate: serosanguaneous            Quantity: moderate  Odor:  absent   Wound Shape regular       Left medial incisional  Ulceration(s):     Wound bed: %100 adherent slough          Undermining no, Tunneling no   Wound Edge: attached   Periwound:  There is slight erika wound erythema, but no induration, maceration, denudement fluctuance or warmth surrounding the ulcer(s).  Exudate: serosanguaneous            Quantity: moderate  Odor:  absent   Wound Shape regular      Labs:    Lab Results   Component Value Date    SEDRATE 21 (H) 08/31/2018     Lab Results   Component Value Date    CRP 4.0 (H) 01/03/2019     No components found for: CREATINE  Lab Results   Component Value Date    BUN 25 01/04/2019     Lab Results    Component Value Date    HGBA1C 6.8 (H) 11/19/2018         Vasc Edema 7/12/2018 10/22/2018 11/12/2018 12/3/2018 1/10/2019   Right just above MTP 24.2 23.5 24 23.5 23.2   Right Ankle 24.5 24 25 24.3 23.5   Right Widest Calf 37.8 35.5 39.1 36 39   Right Thigh Up 10cm 47 41.5 46.2 45.5  46   Left - just above MTP 27.8 26.5 27 26 25.5   Left Ankle 22.2 23.0 23 23.5 23   Left Widest Calf 33.3 32.5 35.1 34.8 35.4   Left Thigh Up 10cm 44.7 40.8 40.2 44.5 46        VASC Wound 10/22/18 left medial leg vein harvest (below knee) (Active)   Pre Size Length 0.9 1/10/2019  9:00 AM   Pre Size Width 0.6 1/10/2019  9:00 AM   Pre Size Depth 0.1 1/10/2019  9:00 AM   Pre Total Sq cm 0.63 1/10/2019  9:00 AM   Post Size Length 0.8 12/20/2018  9:00 AM   Post Size Width 0.8 12/20/2018  9:00 AM   Post Size Depth 0.1 12/20/2018  9:00 AM   Post Total Sq cm 0.7 12/3/2018  1:00 PM   Description 1.7 diagonally @ 1 & 7 o'clock 1/10/2019  9:00 AM   Prodcut Used Bactroban 10/22/2018  2:00 PM       VASC Wound 11/12/18 left lateral lower leg (Active)   Pre Size Length 3 1/10/2019  9:00 AM   Pre Size Width 2.2 1/10/2019  9:00 AM   Pre Size Depth 0.1 1/10/2019  9:00 AM   Pre Total Sq cm 6.6 1/10/2019  9:00 AM   Post Size Length 2.8 1/10/2019  9:00 AM   Post Size Width 2.2 1/10/2019  9:00 AM   Post Size Depth 0.1 12/20/2018  9:00 AM   Undermined n 11/12/2018  2:00 PM   Tunneling n 11/12/2018  2:00 PM   Description weeping 12/3/2018  1:00 PM       Wound 08/11/18 Non-pressure related ulcer Leg Right;Anterior (front) (Active)       Wound 01/04/19 Non-pressure related ulcer Leg Left (Active)       Wound 01/04/19 Non-pressure related ulcer Leg Right (Active)       Incision 10/04/18 Leg Left (Active)       Assessment:  1. Leg ulcer, left, limited to breakdown of skin (H)  Change dressing   2. Venous stasis dermatitis of both lower extremities  Change dressing   3. Venous hypertension, chronic, bilateral  Change dressing   4. Ischemic cardiomyopathy  Change  dressing   5. Non-insulin dependent type 2 diabetes mellitus (H)  Change dressing       A new wound was identified today: left lateral and medial leg ulcer      Plan:    1.  Debridement of the Left leg ulcer was recommended.  After consent was obtained and topical 2% Xylocaine was applied under clean conditions, and using a #15 blade,the devitalized dermal tissue was debrided for a total square centimeters of 7.23. Following debridement, there was a decrease in the nonviable tissue. The patient tolerated the procedure without any difficulty.     2. Venous and contact dermatitis, resolved.  TMC discontinued. However, dermatology recommended daily use of the steroid and two times a day on his arms and trunk for a rash.    3.  Venous insufficiency, stable    4.  Left leg ulcer, stable.    5.  PAD, has a history of a coronary artery bypass graft, but there are no EVELYNE of his lower extremity.  Referred for PAD testing secondary to his leg ulcers.    6.  Edema, stable    7.  Diabetes, on Metformin.  6.6 A1c on 11/1/2017.      8.  Treatment: For the wounds, he will discontinue silvercel and start Iodofoam with an ABD.  A 2-layer wrap lite wrap will be applied for compression.  TMC cream will be applied to his legs with each dressing change.    9.  Patient will follow up with me in 2-3 weeks  for further evaluation and response to the treatment.      Miriam Quinones, APRN, CNP,  [facility]  [phone]

## 2021-06-27 NOTE — PROGRESS NOTES
Progress Notes by Miriam Quinones CNP at 4/5/2019  1:00 PM     Author: Miriam Quinones CNP Service: -- Author Type: Nurse Practitioner    Filed: 4/5/2019  1:35 PM Encounter Date: 4/5/2019 Status: Signed    : Miriam Quinones CNP (Nurse Practitioner)       Date of Service:4/5/2019    Chief Complaint:   Chief Complaint   Patient presents with   ? Wound Check     3 week follow up. Left lower leg wounds.        History:    The patient was last seen by me on 3/15/2019 when Endoform and xeroform was started and a 2-layer wrap continued.  For the ulcer near the knee, a Tegaderm foam adhesive was applied.  He is having it changed weekly in our clinic.  He is tolerating the adhesive on this dressing and 2-layer wrap.  He denies any pain in his ulcer.    Current Outpatient Medications   Medication Sig Dispense Refill   ? acetaminophen (TYLENOL) 325 MG tablet Take 2 tablets (650 mg total) by mouth every 6 (six) hours as needed for pain.  0   ? albuterol (PROVENTIL) 2.5 mg /3 mL (0.083 %) nebulizer solution Take 3 mL (2.5 mg total) by nebulization every 6 (six) hours as needed for wheezing or shortness of breath.  0   ? aluminum-magnesium hydroxide-simethicone (MAALOX ADVANCED) 200-200-20 mg/5 mL Susp Take 30 mL by mouth every 4 (four) hours as needed.     ? aspirin 81 mg chewable tablet Chew 1 tablet (81 mg total) daily.  0   ? bacitracin 500 unit/gram ointment Apply 1 application topically daily. To chest tube sites     ? bisacodyl (DULCOLAX) 5 mg EC tablet Take 10 mg by mouth daily as needed for constipation.     ? carbamide peroxide (DEBROX) 6.5 % otic solution Administer 2 drops into both ears at bedtime as needed.            ? cetirizine (ZYRTEC) 10 MG tablet Take 10 mg by mouth daily.     ? dextromethorphan-guaifenesin (DIABETIC TUSSIN DM)  mg/5 mL Liqd Take 10 mL by mouth 4 (four) times a day as needed.     ? diphenhydrAMINE (BENADRYL) 50 MG capsule Take 50 mg by mouth 3 (three) times a day  as needed for itching.     ? emollient (VANICREAM) Crea Apply 1 application topically 3 (three) times a day as needed (dry skin).  0   ? furosemide (LASIX) 20 MG tablet Take 20 mg by mouth daily as needed (at noon, if weight increase 2lbs in 24 hours).            ? gentamicin (GARAMYCIN) 0.1 % ointment Apply 1 application topically daily as needed.     ? glipiZIDE (GLUCOTROL) 10 MG tablet Take 10 mg by mouth daily before breakfast.     ? glipiZIDE (GLUCOTROL) 10 MG tablet Take 15 mg by mouth every evening.     ? hydrocortisone 1 % ointment Apply topically 2 (two) times a day. Apply to face for skin rash 30 g 0   ? ketotifen (ZADITOR/ZYRTEC ITCHY EYES) 0.025 % (0.035 %) ophthalmic solution Administer 1 drop to both eyes 2 (two) times a day. 10 mL 0   ? Lactobacillus acidophilus 100 mg (1 billion cell) cap Take 1 capsule by mouth 2 (two) times a day. 60 capsule 0   ? MEPHYTON 5 mg tablet   0   ? miconazole (DESENEX) 2 % powder Apply 1 application topically daily as needed for itching.     ? nitroglycerin (NITROSTAT) 0.4 MG SL tablet Place 0.4 mg under the tongue every 5 (five) minutes as needed for chest pain.      ? omeprazole (PRILOSEC) 20 MG capsule Take 20 mg by mouth daily.      ? simvastatin (ZOCOR) 40 MG tablet Take 40 mg by mouth at bedtime.      ? spironolactone (ALDACTONE) 25 MG tablet Take 25 mg by mouth daily.     ? urea 10 % lotion Apply 1 application topically daily as needed.     ? warfarin (COUMADIN/JANTOVEN) 4 MG tablet Take 4 mg by mouth daily.       Current Facility-Administered Medications   Medication Dose Route Frequency Provider Last Rate Last Dose   ? lidocaine 2 % jelly (XYLOCAINE)   Topical PRN Miriam Quinones, CNP   1 application at 01/10/19 0951       Allergies   Allergen Reactions   ? Latex    ? Penicillins Rash     Childhood rxn  -- tolerated cefazolin 10/4/18       Physical Exam:    Vitals:    04/05/19 1240   BP: 138/70   Pulse: (!) 58   Temp: 98.6  F (37  C)    Body mass index is  26.99 kg/m .    General:  73 y.o. male in no apparent distress.    Psychiatric:  Alert and oriented x 3.  Cooperative.   Integumentary:  Skin is uniformly warm and dry.    Lower extremity edema: minimal    Photo:        Left lateral leg Ulceration(s):     Wound bed: 100% granulation   Undermining no, Tunneling no   Wound Edge: attached   Periwound:  There is no erika wound erythema, induration, maceration, denudement fluctuance or warmth surrounding the ulcer(s).  Exudate: serosanguaneous            Quantity: minimal  Odor:  absent   Wound Shape regular       Left medial incisional  Ulceration(s):     Wound bed: 100% dry exudate     Undermining no, Tunneling no   Wound Edge: attached   Periwound:  There is erika wound erythema, but no induration, maceration, denudement fluctuance or warmth surrounding the ulcer(s).  Exudate: none  Odor:  absent   Wound Shape regular    Labs:    Lab Results   Component Value Date    SEDRATE 21 (H) 08/31/2018     Lab Results   Component Value Date    CRP 4.0 (H) 01/03/2019     No components found for: CREATINE  Lab Results   Component Value Date    BUN 25 01/04/2019     Lab Results   Component Value Date    HGBA1C 10.2 (H) 03/05/2019         Vasc Edema 11/12/2018 12/3/2018 1/10/2019 2/28/2019 3/15/2019   Right just above MTP 24 23.5 23.2 - 23.8   Right Ankle 25 24.3 23.5 - 24.5   Right Widest Calf 39.1 36 39 - 36.8   Right Thigh Up 10cm 46.2 45.5  46 - 47   Left - just above MTP 27 26 25.5 27.5 26.5   Left Ankle 23 23.5 23 22.1 21.7   Left Widest Calf 35.1 34.8 35.4 31.1 31.5   Left Thigh Up 10cm 40.2 44.5 46  45.5 46.2       VASC Wound 10/22/18 left medial leg vein harvest (below knee) (Active)   Pre Size Length 1 3/22/2019 12:00 PM   Pre Size Width 0.5 3/22/2019 12:00 PM   Pre Size Depth 0.1 3/22/2019 12:00 PM   Pre Total Sq cm 0.25 3/22/2019 12:00 PM   Post Size Length 1 3/15/2019 12:00 PM   Post Size Width 0.4 3/15/2019 12:00 PM   Post Size Depth 0.01 3/15/2019 12:00 PM   Post Total  Sq cm 0.4 3/15/2019 12:00 PM   Undermined no 1/22/2019  3:00 PM   Tunneling no 1/22/2019  3:00 PM   Description scab 3/29/2019  1:00 PM   Prodcut Used Bactroban 10/22/2018  2:00 PM       VASC Wound 11/12/18 left lateral lower leg (Active)   Pre Size Length 1 3/29/2019  1:00 PM   Pre Size Width 0.5 3/29/2019  1:00 PM   Pre Size Depth 0.1 3/29/2019  1:00 PM   Pre Total Sq cm 0.5 3/29/2019  1:00 PM   Post Size Length 1 3/15/2019 12:00 PM   Post Size Width 0.3 3/15/2019 12:00 PM   Post Size Depth 0.01 3/15/2019 12:00 PM   Post Total Sq cm 0.3 3/15/2019 12:00 PM   Undermined n 11/12/2018  2:00 PM   Tunneling n 11/12/2018  2:00 PM   Description weeping 12/3/2018  1:00 PM       Wound 08/11/18 Non-pressure related ulcer Leg Right;Anterior (front) (Active)       Wound 01/04/19 Non-pressure related ulcer Leg Left (Active)       Wound 01/04/19 Non-pressure related ulcer Leg Right (Active)       Incision 10/04/18 Leg Left (Active)     Imaging:    [facility] RADIOLOGY  LOCATION: [facility] OUTPATIENT SERVICES  DATE: 12/20/2018     EXAM: RESTING ANKLE-BRACHIAL INDICES (ABIs)     INDICATION: Lower extremity pain, left leg ulcers, left transmetatarsal amputation.     COMPARISON: None.     EVELYNE FINDINGS:   SEGMENTAL BP  RIGHT (mmHg)  Brachial: 134  High Thigh: 190; Index 1.36  Low Thigh: 173; Index 1.24  Calf: >254; Index -NC-  Ankle (PT): 11.; Index 0.79  Ankle (DP): 138; Index 0.99  Digit: 58; Index 0.41     LEFT (mmHg)  Brachial: 140  High Thigh: 154; Index 1.10  Low Thigh: 155; Index 1.11  Calf: 115; Index 0.82  Ankle (PT): 130; Index 0.93  Ankle (DP): 156; Index 1.11     IMPRESSION:   1. RIGHT LOWER EXTREMITY: Resting ankle-brachial index of 0.99 is likely artifactual secondary to vessel calcification. Great toe pressure of 58 mmHg is adequate for wound healing.     2. LEFT LOWER EXTREMITY: Resting ankle-brachial index of 0.93 is likely artifactual secondary to vessel calcification. Status post left transmetatarsal  amputation.     3. Given development of a new wound, further evaluation with a bilateral lower extremity ultrasound and/or CT angiogram with bilateral lower extremity runoff is suggested.         Assessment:  1. Leg ulcer, left, limited to breakdown of skin (H)     2. Venous hypertension, chronic, bilateral     3. Acquired lymphedema of lower extremity     4. Non-insulin dependent type 2 diabetes mellitus (H)     5. Allergic contact dermatitis due to adhesives     6. Ischemic cardiomyopathy         A new wound was identified today: No      Plan:    1.  Debridement of the Left lateral leg ulcer was recommended.  After consent was obtained and topical 2% Xylocaine was applied under clean conditions, and using a #15 blade,the devitalized dermal tissue was debrided for a total square centimeters of 2.2.  Following debridement, there was a decrease in the nonviable tissue. The patient tolerated the procedure without any difficulty.     2. Venous and contact dermatitis, resolved.      3.  Venous insufficiency, stable    4.  Left lateral leg ulcer, larger, but question the accuracy of the measurements because the ulcer is very healthy in appearance and less deep.    5.  Left Medial leg ulcer, resolved    6.  Edema, stable    7.  Diabetes, on Metformin.  6.6 A1c on 11/1/2017.      8.  Treatment:  For the medial leg ulcer, will apply Tegaderm + Pad.    For the lateral left leg ulcer, will apply Endoform and cover with xerform and an ABD.  A 2-layer wrap will be applied for compression and changed weekly by nurses in our clinic.    9.  Patient will follow up with a nurse visit weekly and me in 2 weeks  for further evaluation and response to the treatment.      Miriam Quinones, APRN, CNP,  [facility]  [phone]

## 2021-06-27 NOTE — PROGRESS NOTES
Progress Notes by Gustabo Castorena LPN at 2/14/2019 11:20 AM     Author: Gustabo Castorena LPN Service: -- Author Type: Licensed Nurse    Filed: 2/14/2019 11:33 AM Encounter Date: 2/14/2019 Status: Signed    : Gustabo Castorena LPN (Licensed Nurse)                 Nurse Visit    Chief Complaint: Patient presents to clinic for assement, and treatment of their ulcer and and swelling    Dressing on Arrival intact    Allergies:   Allergies   Allergen Reactions   ? Latex    ? Penicillins Rash     Childhood rxn  -- tolerated cefazolin 10/4/18       Medications:   Current Outpatient Medications:   ?  acetaminophen (TYLENOL) 325 MG tablet, Take 2 tablets (650 mg total) by mouth every 6 (six) hours as needed for pain., Disp: , Rfl: 0  ?  albuterol (PROVENTIL) 2.5 mg /3 mL (0.083 %) nebulizer solution, Take 3 mL (2.5 mg total) by nebulization every 6 (six) hours as needed for wheezing or shortness of breath., Disp: , Rfl: 0  ?  aluminum-magnesium hydroxide-simethicone (MAALOX ADVANCED) 200-200-20 mg/5 mL Susp, Take 30 mL by mouth every 4 (four) hours as needed., Disp: , Rfl:   ?  aspirin 81 mg chewable tablet, Chew 1 tablet (81 mg total) daily., Disp: , Rfl: 0  ?  bacitracin 500 unit/gram ointment, Apply 1 application topically daily. To chest tube sites, Disp: , Rfl:   ?  bisacodyl (DULCOLAX) 5 mg EC tablet, Take 10 mg by mouth daily as needed for constipation., Disp: , Rfl:   ?  carbamide peroxide (DEBROX) 6.5 % otic solution, Administer 2 drops into both ears at bedtime as needed.    , Disp: , Rfl:   ?  cetirizine (ZYRTEC) 10 MG tablet, Take 10 mg by mouth daily., Disp: , Rfl:   ?  dextromethorphan-guaifenesin (DIABETIC TUSSIN DM)  mg/5 mL Liqd, Take 10 mL by mouth 4 (four) times a day as needed., Disp: , Rfl:   ?  diphenhydrAMINE (BENADRYL) 50 MG capsule, Take 50 mg by mouth 3 (three) times a day as needed for itching., Disp: , Rfl:   ?  emollient (VANICREAM) Crea, Apply 1 application topically  3 (three) times a day as needed (dry skin)., Disp: , Rfl: 0  ?  furosemide (LASIX) 20 MG tablet, Take 20 mg by mouth daily as needed (at noon, if weight increase 2lbs in 24 hours).    , Disp: , Rfl:   ?  gentamicin (GARAMYCIN) 0.1 % ointment, Apply 1 application topically daily as needed., Disp: , Rfl:   ?  glipiZIDE (GLUCOTROL) 10 MG tablet, Take 10 mg by mouth daily before breakfast., Disp: , Rfl:   ?  glipiZIDE (GLUCOTROL) 10 MG tablet, Take 15 mg by mouth every evening., Disp: , Rfl:   ?  hydrocortisone 1 % ointment, Apply topically 2 (two) times a day. Apply to face for skin rash, Disp: 30 g, Rfl: 0  ?  ketotifen (ZADITOR/ZYRTEC ITCHY EYES) 0.025 % (0.035 %) ophthalmic solution, Administer 1 drop to both eyes 2 (two) times a day., Disp: 10 mL, Rfl: 0  ?  Lactobacillus acidophilus 100 mg (1 billion cell) cap, Take 1 capsule by mouth 2 (two) times a day., Disp: 60 capsule, Rfl: 0  ?  MEPHYTON 5 mg tablet, , Disp: , Rfl: 0  ?  miconazole (DESENEX) 2 % powder, Apply 1 application topically daily as needed for itching., Disp: , Rfl:   ?  nitroglycerin (NITROSTAT) 0.4 MG SL tablet, Place 0.4 mg under the tongue every 5 (five) minutes as needed for chest pain. , Disp: , Rfl:   ?  omeprazole (PRILOSEC) 20 MG capsule, Take 20 mg by mouth daily. , Disp: , Rfl:   ?  simvastatin (ZOCOR) 40 MG tablet, Take 40 mg by mouth at bedtime. , Disp: , Rfl:   ?  spironolactone (ALDACTONE) 25 MG tablet, Take 25 mg by mouth daily., Disp: , Rfl:   ?  urea 10 % lotion, Apply 1 application topically daily as needed., Disp: , Rfl:   ?  warfarin (COUMADIN/JANTOVEN) 4 MG tablet, Take 4 mg by mouth daily., Disp: , Rfl:     Current Facility-Administered Medications:   ?  lidocaine 2 % jelly (XYLOCAINE), , Topical, PRN, O'Gardenia, Miriam Johnson, CNP, 1 application at 01/10/19 0951    Vital Signs: /66 (Patient Site: Left Arm, Patient Position: Sitting, Cuff Size: Adult Regular)   Pulse 72 Comment: irregular  Temp 97.7  F (36.5  C) (Oral)    Resp 18       Assessment:    General:  Patient presents to clinic in no apparent distress.  Psychiatric:  Alert and oriented x3.   Lower extremity:  edema is present.    Integumentary:  Skin is uniformly warm, dry and pink.    Wound size:   VASC Wound 10/22/18 left medial leg vein harvest (below knee) (Active)   Pre Size Length 2.4 2019 10:00 AM   Pre Size Width 0.7 2019 10:00 AM   Pre Size Depth 0.1 2019 10:00 AM   Pre Total Sq cm 1.68 2019 10:00 AM   Post Size Length 2.4 2019  2:00 PM   Post Size Width 0.9 2019  2:00 PM   Post Size Depth 0.4 2019  3:00 PM   Post Total Sq cm 0.7 12/3/2018  1:00 PM   Undermined no 2019  3:00 PM   Tunneling no 2019  3:00 PM   Description 0.6@1700 2019 12:00 PM   Prodcut Used Bactroban 10/22/2018  2:00 PM       VASC Wound 18 left lateral lower leg (Active)   Pre Size Length 2 2019 10:00 AM   Pre Size Width 1.9 2019 10:00 AM   Pre Size Depth 0.1 2019 10:00 AM   Pre Total Sq cm 3.8 2019 10:00 AM   Post Size Length 2 2019  2:00 PM   Post Size Width 1.8 2019  2:00 PM   Post Size Depth 0.1 2018  9:00 AM   Undermined n 2018  2:00 PM   Tunneling n 2018  2:00 PM   Description weeping 12/3/2018  1:00 PM       Wound 18 Non-pressure related ulcer Leg Right;Anterior (front) (Active)       Wound 19 Non-pressure related ulcer Leg Left (Active)       Wound 19 Non-pressure related ulcer Leg Right (Active)       Incision 10/04/18 Leg Left (Active)      Undermining is not present.    The periwoundskin is pink and WNL      Plan:         1. Patient will in 5 days         2. Treatment provided will include irrigation and dressings to promote autolytic debridement and will be as listed below     Cleansed with: Normal Saline    Protected skin with: Remedy Skin Repair    Dressings Applied: Tegaderm foam adhesive, abd, roll gauze, and Collagen    Compression Applied to the Left Le-Layer  Coban    Compression Applied to the Right Leg: double layer tubular compression.     Offloading applied: None    Trial Products: no  Provider notified regarding concerns: no  Treatment Changes: no    Educational Barriers: none  Taught Regarding: Acitivity, Compliance, Compression and Dressing  Teaching Method: Exaplanation and DC sheet                 Gustabo Castorena LPN 02/14/19 1132

## 2021-06-27 NOTE — PROGRESS NOTES
Progress Notes by Alicia Moreno LPN at 4/11/2019  1:20 PM     Author: Alicia Moreno LPN Service: -- Author Type: Licensed Nurse    Filed: 4/11/2019  2:07 PM Encounter Date: 4/11/2019 Status: Signed    : Alicia Moreno LPN (Licensed Nurse)                         Nurse Visit    Chief Complaint: Patient presents to clinic for assement, and treatment of their ulcer and and swelling    Dressing on Arrival 2 layer compressio, xeroform, ABD pad    Allergies:   Allergies   Allergen Reactions   ? Latex    ? Penicillins Rash     Childhood rxn  -- tolerated cefazolin 10/4/18       Medications:   Current Outpatient Medications:   ?  acetaminophen (TYLENOL) 325 MG tablet, Take 2 tablets (650 mg total) by mouth every 6 (six) hours as needed for pain., Disp: , Rfl: 0  ?  albuterol (PROVENTIL) 2.5 mg /3 mL (0.083 %) nebulizer solution, Take 3 mL (2.5 mg total) by nebulization every 6 (six) hours as needed for wheezing or shortness of breath., Disp: , Rfl: 0  ?  aluminum-magnesium hydroxide-simethicone (MAALOX ADVANCED) 200-200-20 mg/5 mL Susp, Take 30 mL by mouth every 4 (four) hours as needed., Disp: , Rfl:   ?  aspirin 81 mg chewable tablet, Chew 1 tablet (81 mg total) daily., Disp: , Rfl: 0  ?  bacitracin 500 unit/gram ointment, Apply 1 application topically daily. To chest tube sites, Disp: , Rfl:   ?  bisacodyl (DULCOLAX) 5 mg EC tablet, Take 10 mg by mouth daily as needed for constipation., Disp: , Rfl:   ?  carbamide peroxide (DEBROX) 6.5 % otic solution, Administer 2 drops into both ears at bedtime as needed.    , Disp: , Rfl:   ?  cetirizine (ZYRTEC) 10 MG tablet, Take 10 mg by mouth daily., Disp: , Rfl:   ?  dextromethorphan-guaifenesin (DIABETIC TUSSIN DM)  mg/5 mL Liqd, Take 10 mL by mouth 4 (four) times a day as needed., Disp: , Rfl:   ?  diphenhydrAMINE (BENADRYL) 50 MG capsule, Take 50 mg by mouth 3 (three) times a day as needed for itching., Disp: , Rfl:   ?  emollient (VANICREAM) Crea, Apply 1  application topically 3 (three) times a day as needed (dry skin)., Disp: , Rfl: 0  ?  furosemide (LASIX) 20 MG tablet, Take 20 mg by mouth daily as needed (at noon, if weight increase 2lbs in 24 hours).    , Disp: , Rfl:   ?  gentamicin (GARAMYCIN) 0.1 % ointment, Apply 1 application topically daily as needed., Disp: , Rfl:   ?  glipiZIDE (GLUCOTROL) 10 MG tablet, Take 10 mg by mouth daily before breakfast., Disp: , Rfl:   ?  glipiZIDE (GLUCOTROL) 10 MG tablet, Take 15 mg by mouth every evening., Disp: , Rfl:   ?  hydrocortisone 1 % ointment, Apply topically 2 (two) times a day. Apply to face for skin rash, Disp: 30 g, Rfl: 0  ?  ketotifen (ZADITOR/ZYRTEC ITCHY EYES) 0.025 % (0.035 %) ophthalmic solution, Administer 1 drop to both eyes 2 (two) times a day., Disp: 10 mL, Rfl: 0  ?  Lactobacillus acidophilus 100 mg (1 billion cell) cap, Take 1 capsule by mouth 2 (two) times a day., Disp: 60 capsule, Rfl: 0  ?  MEPHYTON 5 mg tablet, , Disp: , Rfl: 0  ?  miconazole (DESENEX) 2 % powder, Apply 1 application topically daily as needed for itching., Disp: , Rfl:   ?  nitroglycerin (NITROSTAT) 0.4 MG SL tablet, Place 0.4 mg under the tongue every 5 (five) minutes as needed for chest pain. , Disp: , Rfl:   ?  omeprazole (PRILOSEC) 20 MG capsule, Take 20 mg by mouth daily. , Disp: , Rfl:   ?  simvastatin (ZOCOR) 40 MG tablet, Take 40 mg by mouth at bedtime. , Disp: , Rfl:   ?  spironolactone (ALDACTONE) 25 MG tablet, Take 25 mg by mouth daily., Disp: , Rfl:   ?  urea 10 % lotion, Apply 1 application topically daily as needed., Disp: , Rfl:   ?  warfarin (COUMADIN/JANTOVEN) 4 MG tablet, Take 4 mg by mouth daily., Disp: , Rfl:     Current Facility-Administered Medications:   ?  lidocaine 2 % jelly (XYLOCAINE), , Topical, PRN, Stacie, Miriam Johnson, CNP, 1 application at 01/10/19 0951    Vital Signs: /70 (Patient Site: Left Arm, Patient Position: Sitting, Cuff Size: Adult Regular)   Pulse 80   Temp 98.6  F (37  C) (Oral)    Resp 24   Ht 6' (1.829 m)   Wt 200 lb (90.7 kg)   BMI 27.12 kg/m        Assessment:    General:  Patient presents to clinic in no apparent distress.  Psychiatric:  Alert and oriented x3.   Lower extremity:  edema is not present.    Integumentary:  Skin is uniformly warm, dry and pink.    Wound size:   VASC Wound 10/22/18 left medial leg vein harvest (below knee) (Active)   Pre Size Length 0.5 4/11/2019  1:00 PM   Pre Size Width 0.4 4/11/2019  1:00 PM   Pre Size Depth 0.1 4/11/2019  1:00 PM   Pre Total Sq cm 0.2 4/11/2019  1:00 PM   Post Size Length 0 4/5/2019 12:00 PM   Post Size Width 0 4/5/2019 12:00 PM   Post Size Depth 0 4/5/2019 12:00 PM   Post Total Sq cm 0.4 3/15/2019 12:00 PM   Undermined no 1/22/2019  3:00 PM   Tunneling no 1/22/2019  3:00 PM   Description scab 3/29/2019  1:00 PM   Prodcut Used Bactroban 10/22/2018  2:00 PM       VASC Wound 11/12/18 left lateral lower leg (Active)   Pre Size Length 0.6 4/11/2019  1:00 PM   Pre Size Width 0.6 4/11/2019  1:00 PM   Pre Size Depth 0.1 4/11/2019  1:00 PM   Pre Total Sq cm 0.36 4/11/2019  1:00 PM   Post Size Length 1 4/5/2019 12:00 PM   Post Size Width 1.2 4/5/2019 12:00 PM   Post Size Depth 0.01 3/15/2019 12:00 PM   Post Total Sq cm 0 4/5/2019 12:00 PM   Undermined n 11/12/2018  2:00 PM   Tunneling n 11/12/2018  2:00 PM   Description weeping 12/3/2018  1:00 PM       Wound 08/11/18 Non-pressure related ulcer Leg Right;Anterior (front) (Active)       Wound 01/04/19 Non-pressure related ulcer Leg Left (Active)       Wound 01/04/19 Non-pressure related ulcer Leg Right (Active)       Incision 10/04/18 Leg Left (Active)      Undermining is not present.    The periwoundskin is WNL      Plan:         1. Patient will follow up in a  Week with provider         2. Treatment provided will include irrigation and dressings to promote autolytic debridement and will be as listed below     Cleansed with: Normal Saline    Protected skin with: Remedy Skin Repair    Dressings  Applied: adaptic, xeroform, ABD pad then roll gauze    Compression Applied to the Left Le-Layer Coban    Compression Applied to the Right Leg: None    Offloading applied: none    Trial Products: no  Provider notified regarding concerns: no  Treatment Changes: no    Educational Barriers: none  Taught Regarding: Acitivity, Compression and Dressing  Teaching Method: Exaplanation and DC sheet

## 2021-06-27 NOTE — PROGRESS NOTES
Progress Notes by Erika Patricia RN at 7/10/2019 11:20 AM     Author: Erika Patricia RN Service: -- Author Type: Registered Nurse    Filed: 7/11/2019  3:59 AM Encounter Date: 7/10/2019 Status: Signed    : Erika Patricia RN (Registered Nurse)           Left shin 7/10              Nurse Visit    Chief Complaint: Patient presents to clinic for assement, and treatment of their left shin wound and foot/ankle swelling bilaterally    Dressing on Arrival: [place], ABD, roll gauze, double tubigrip bilaterally (tubigrip pulled up on foot)    Allergies:   Allergies   Allergen Reactions   ? Latex    ? Penicillins Rash     Childhood rxn  -- tolerated cefazolin 10/4/18       Medications:   Current Outpatient Medications:   ?  acetaminophen (TYLENOL) 325 MG tablet, Take 2 tablets (650 mg total) by mouth every 6 (six) hours as needed for pain., Disp: , Rfl: 0  ?  albuterol (PROVENTIL) 2.5 mg /3 mL (0.083 %) nebulizer solution, Take 3 mL (2.5 mg total) by nebulization every 6 (six) hours as needed for wheezing or shortness of breath., Disp: , Rfl: 0  ?  aluminum-magnesium hydroxide-simethicone (MAALOX ADVANCED) 200-200-20 mg/5 mL Susp, Take 30 mL by mouth every 4 (four) hours as needed., Disp: , Rfl:   ?  aspirin 81 mg chewable tablet, Chew 1 tablet (81 mg total) daily., Disp: , Rfl: 0  ?  bacitracin 500 unit/gram ointment, Apply 1 application topically daily. To chest tube sites, Disp: , Rfl:   ?  bisacodyl (DULCOLAX) 5 mg EC tablet, Take 10 mg by mouth daily as needed for constipation., Disp: , Rfl:   ?  carbamide peroxide (DEBROX) 6.5 % otic solution, Administer 2 drops into both ears at bedtime as needed.    , Disp: , Rfl:   ?  cetirizine (ZYRTEC) 10 MG tablet, Take 10 mg by mouth daily., Disp: , Rfl:   ?  dextromethorphan-guaifenesin (DIABETIC TUSSIN DM)  mg/5 mL Liqd, Take 10 mL by mouth 4 (four) times a day as needed., Disp: , Rfl:   ?  diphenhydrAMINE (BENADRYL) 50 MG capsule, Take 50 mg by mouth 3 (three)  times a day as needed for itching., Disp: , Rfl:   ?  emollient (VANICREAM) Crea, Apply 1 application topically 3 (three) times a day as needed (dry skin)., Disp: , Rfl: 0  ?  furosemide (LASIX) 20 MG tablet, Take 20 mg by mouth daily as needed (at noon, if weight increase 2lbs in 24 hours).    , Disp: , Rfl:   ?  gentamicin (GARAMYCIN) 0.1 % ointment, Apply 1 application topically daily as needed., Disp: , Rfl:   ?  glipiZIDE (GLUCOTROL) 10 MG tablet, Take 10 mg by mouth daily before breakfast., Disp: , Rfl:   ?  glipiZIDE (GLUCOTROL) 10 MG tablet, Take 15 mg by mouth every evening., Disp: , Rfl:   ?  hydrocortisone 1 % ointment, Apply topically 2 (two) times a day. Apply to face for skin rash, Disp: 30 g, Rfl: 0  ?  ketotifen (ZADITOR/ZYRTEC ITCHY EYES) 0.025 % (0.035 %) ophthalmic solution, Administer 1 drop to both eyes 2 (two) times a day., Disp: 10 mL, Rfl: 0  ?  Lactobacillus acidophilus 100 mg (1 billion cell) cap, Take 1 capsule by mouth 2 (two) times a day., Disp: 60 capsule, Rfl: 0  ?  MEPHYTON 5 mg tablet, , Disp: , Rfl: 0  ?  miconazole (DESENEX) 2 % powder, Apply 1 application topically daily as needed for itching., Disp: , Rfl:   ?  nitroglycerin (NITROSTAT) 0.4 MG SL tablet, Place 0.4 mg under the tongue every 5 (five) minutes as needed for chest pain. , Disp: , Rfl:   ?  omeprazole (PRILOSEC) 20 MG capsule, Take 20 mg by mouth daily. , Disp: , Rfl:   ?  simvastatin (ZOCOR) 40 MG tablet, Take 40 mg by mouth at bedtime. , Disp: , Rfl:   ?  spironolactone (ALDACTONE) 25 MG tablet, Take 25 mg by mouth daily., Disp: , Rfl:   ?  urea 10 % lotion, Apply 1 application topically daily as needed., Disp: , Rfl:   ?  warfarin (COUMADIN/JANTOVEN) 4 MG tablet, Take 4 mg by mouth daily., Disp: , Rfl:     Current Facility-Administered Medications:   ?  lidocaine 2 % jelly (XYLOCAINE), , Topical, PRN, Miriam Quinones, CNP, 1 application at 05/02/19 1400    Vital Signs: There were no vitals taken for this  visit.      Assessment:    General:  Patient presents to clinic in no apparent distress.  Psychiatric:  Alert and oriented x3.   Lower extremity:  edema is present.    Integumentary:  Skin is uniformly warm, dry and pink.    Wound size:   VASC Wound 10/22/18 left medial leg vein harvest (below knee) (Active)   Pre Size Length 0.5 4/11/2019  1:00 PM   Pre Size Width 0.4 4/11/2019  1:00 PM   Pre Size Depth 0.1 4/11/2019  1:00 PM   Pre Total Sq cm 0.2 4/11/2019  1:00 PM   Post Size Length 0 4/5/2019 12:00 PM   Post Size Width 0 4/5/2019 12:00 PM   Post Size Depth 0 4/5/2019 12:00 PM   Post Total Sq cm 0.4 3/15/2019 12:00 PM   Undermined no 4/11/2019  1:00 PM   Tunneling no 4/11/2019  1:00 PM   Description scab 5/16/2019 12:00 PM   Prodcut Used Bactroban 10/22/2018  2:00 PM       VASC Wound 05/02/19 left mid shin (Active)   Pre Size Length 0 5/16/2019 12:00 PM   Pre Size Width 0 5/16/2019 12:00 PM   Pre Size Depth 0 5/16/2019 12:00 PM   Pre Total Sq cm 0 5/9/2019  1:00 PM   Description healed 6/12/2019  1:00 PM       VASC Wound 05/02/19 left medial ankle (Active)   Pre Size Length 0 5/16/2019 12:00 PM   Pre Size Width 0 5/16/2019 12:00 PM   Pre Size Depth 0 5/16/2019 12:00 PM   Pre Total Sq cm 0 5/16/2019 12:00 PM   Description healed 6/12/2019  1:00 PM       VASC Wound 05/02/19 left middle medial leg (Active)   Pre Size Length 0 5/9/2019  1:00 PM   Pre Size Width 0 5/9/2019  1:00 PM   Pre Size Depth 0 5/9/2019  1:00 PM   Pre Total Sq cm 0 5/9/2019  1:00 PM   Description healed 6/12/2019  1:00 PM       VASC Wound 05/29/19 Lt lateral shin superior (Active)   Pre Size Length 2 5/29/2019  1:00 PM   Pre Size Width 1.5 5/29/2019  1:00 PM   Pre Size Depth 0.1 5/29/2019  1:00 PM   Pre Total Sq cm 3 5/29/2019  1:00 PM   Description healed 6/12/2019  1:00 PM       VASC Wound 05/29/19 Lt lateral shin distal (Active)   Pre Size Length 1.3 5/29/2019  1:00 PM   Pre Size Width 1.2 5/29/2019  1:00 PM   Pre Size Depth 0.1 5/29/2019   1:00 PM   Pre Total Sq cm 1.56 5/29/2019  1:00 PM   Description healed 6/12/2019  1:00 PM       VASC Wound 07/03/19 Lt shin (Active)   Pre Size Length 0.2 7/10/2019 12:00 PM   Pre Size Width 0.3 7/10/2019 12:00 PM   Pre Size Depth 0.1 7/10/2019 12:00 PM   Pre Total Sq cm 0.06 7/10/2019 12:00 PM       Wound 08/11/18 Non-pressure related ulcer Leg Right;Anterior (front) (Active)       Wound 01/04/19 Non-pressure related ulcer Leg Left (Active)       Wound 01/04/19 Non-pressure related ulcer Leg Right (Active)       Incision 10/04/18 Leg Left (Active)      Undermining is not present.    The periwoundskin is WNL    Plan:         1. Patient will in 1 week; 2 weeks with Heather Robertson CNP         2. Treatment provided will include irrigation and dressings to promote autolytic debridement and will be as listed below     Cleansed with: Normal Saline    Protected skin with: Remedy Skin Repair    Dressings Applied: Silver alginate    Compression Applied to the Left Leg: Tubigrip    Compression Applied to the Right Leg: Tubigrip    Offloading applied: None    Trial Products: no  Provider notified regarding concerns: no  Treatment Changes: no    Educational Barriers: none  Taught Regarding: Activity, Compliance, Compression and Dressing  Teaching Method: Explanation and Handout

## 2021-06-27 NOTE — PROGRESS NOTES
Progress Notes by Brian Moe MD at 4/4/2019  1:10 PM     Author: Brian Moe MD Service: -- Author Type: Physician    Filed: 4/4/2019  1:50 PM Encounter Date: 4/4/2019 Status: Signed    : Brian Moe MD (Physician)           Click to link to [facility]     [facility] Clinic Note      Assessment:    1. Coronary artery disease due to lipid rich plaque     2. Heart failure with preserved ejection fraction, NYHA class II (H)     3. Dyslipidemia, goal LDL below 70  Lipid Profile   4. Paroxysmal atrial fibrillation (H)         Plan:    1. Continue aspirin on or around the one-year anniversary of his coronary surgery, which will be this October 4.    2. Remain on warfarin or a novel oral anticoagulant lifelong.    3. Return to see Dr. Moe in 1 year after a new lipid profile.    An After Visit Summary was printed and given to the patient.    Subjective:    Ever Crane returned for a planned follow up visit.  His sister, Miriam, accompanied him to the visit.    He is now living permanently at [facility] and his sister tells me he has come under the care of Dr. Mandi Ugarte, a geriatrician with the [facility].  His metoprolol was discontinued last fall due to asymptomatic bradycardia.  He saw Sukhwinder Acevedo in follow-up in the atrial fibrillation clinic and Holter monitor showed sinus rhythm with normal heart rate variability.  He could not have left atrial appendage ligation or pulmonic vein isolation at the time of coronary surgery as Dr. John Alfonso noted that he had extensive scarring in that region of his mediastinum.    Ever does not experience angina pectoris, shortness of breath, palpitations, lightheadedness, orthopnea, or lower extremity edema.    Past Medical History:    Patient Active Problem List   Diagnosis   ? Dyslipidemia, goal LDL below 70   ? Paroxysmal atrial fibrillation (H)   ? Typical atrial flutter (H)   ? Venous hypertension,  chronic, bilateral   ? Polyneuropathy associated with underlying disease (H)   ? Type 2 diabetes mellitus with complication, without long-term current use of insulin (H)   ? Acquired lymphedema of lower extremity   ? Coronary artery disease due to lipid rich plaque   ? PAD (peripheral artery disease) (H)   ? Heart failure with preserved ejection fraction, NYHA class II (H)   ? Essential hypertension   ? Medically noncompliant   ? Carotid stenosis, asymptomatic, right   ? Other atopic dermatitis   ? Chronic venous stasis dermatitis       Past Medical History:   Diagnosis Date   ? Atopic keratoconjunctivitis    ? Atrial flutter (H)    ? Bilateral lower leg cellulitis 8/31/2018   ? Candidiasis of perineum 1/3/2018   ? Coronary artery disease due to lipid rich plaque 2000    CABG x2   ? Diabetic ulcer of both feet (H) 10/31/2017   ? Dyslexia    ? Dyslipidemia, goal LDL below 70 2000   ? Essential hypertension    ? Gangrene of left foot (H)    ? MRSA (methicillin resistant Staphylococcus aureus)    ? Neuropathy (H)    ? Non-STEMI (non-ST elevated myocardial infarction) (H)    ? Paroxysmal Atrial Fibrillation     Brian Moe: 8/2011 Cardioversion; CHADS2 VASC = 5; he is on warfarin and sotalol    ? Pneumonia 6/26/2018   ? Type 2 diabetes mellitus, without long-term current use of insulin (H)    ? Unable to function independently 11/13/2017       Past Surgical History:   Procedure Laterality Date   ? CARDIOVERSION  8/25/2011   ? CORONARY ARTERY BYPASS GRAFT  3/6/2000    SVG to OM1, SVG to PDA   ? CV CORONARY ANGIOGRAM N/A 10/1/2018    Procedure: Coronary Angiogram;  Surgeon: Miki Mac MD;  Location: [facility] Cath Lab;  Service:    ? FOOT AMPUTATION Left 11/5/2017    Procedure: LEFT TRANSMETATARSAL AMPUTATION;  Surgeon: Ever Wick MD;  Location: [facility] OR;  Service:    ? INGUINAL HERNIA REPAIR Bilateral 1969 and 1979        reports that he quit smoking about 18 years ago. His smoking use included  cigarettes. He started smoking about 54 years ago. He has a 36.00 pack-year smoking history. he has never used smokeless tobacco. He reports that he drinks about 3.0 oz of alcohol per week. He reports that he does not use drugs.    Family History   Problem Relation Age of Onset   ? Sudden death Mother 85   ? CABG Father    ? Valvular heart disease Father    ? Esophageal cancer Father 58        cause of death   ? No Medical Problems Son    ? No Medical Problems Grandchild    ? No Medical Problems Grandchild        Outpatient Encounter Medications as of 4/4/2019   Medication Sig Dispense Refill   ? acetaminophen (TYLENOL) 325 MG tablet Take 2 tablets (650 mg total) by mouth every 6 (six) hours as needed for pain.  0   ? albuterol (PROVENTIL) 2.5 mg /3 mL (0.083 %) nebulizer solution Take 3 mL (2.5 mg total) by nebulization every 6 (six) hours as needed for wheezing or shortness of breath.  0   ? aluminum-magnesium hydroxide-simethicone (MAALOX ADVANCED) 200-200-20 mg/5 mL Susp Take 30 mL by mouth every 4 (four) hours as needed.     ? aspirin 81 mg chewable tablet Chew 1 tablet (81 mg total) daily.  0   ? bacitracin 500 unit/gram ointment Apply 1 application topically daily. To chest tube sites     ? bisacodyl (DULCOLAX) 5 mg EC tablet Take 10 mg by mouth daily as needed for constipation.     ? carbamide peroxide (DEBROX) 6.5 % otic solution Administer 2 drops into both ears at bedtime as needed.            ? cetirizine (ZYRTEC) 10 MG tablet Take 10 mg by mouth daily.     ? dextromethorphan-guaifenesin (DIABETIC TUSSIN DM)  mg/5 mL Liqd Take 10 mL by mouth 4 (four) times a day as needed.     ? diphenhydrAMINE (BENADRYL) 50 MG capsule Take 50 mg by mouth 3 (three) times a day as needed for itching.     ? emollient (VANICREAM) Crea Apply 1 application topically 3 (three) times a day as needed (dry skin).  0   ? furosemide (LASIX) 20 MG tablet Take 20 mg by mouth daily as needed (at noon, if weight increase 2lbs in  24 hours).            ? gentamicin (GARAMYCIN) 0.1 % ointment Apply 1 application topically daily as needed.     ? glipiZIDE (GLUCOTROL) 10 MG tablet Take 10 mg by mouth daily before breakfast.     ? glipiZIDE (GLUCOTROL) 10 MG tablet Take 15 mg by mouth every evening.     ? hydrocortisone 1 % ointment Apply topically 2 (two) times a day. Apply to face for skin rash 30 g 0   ? ketotifen (ZADITOR/ZYRTEC ITCHY EYES) 0.025 % (0.035 %) ophthalmic solution Administer 1 drop to both eyes 2 (two) times a day. 10 mL 0   ? Lactobacillus acidophilus 100 mg (1 billion cell) cap Take 1 capsule by mouth 2 (two) times a day. 60 capsule 0   ? MEPHYTON 5 mg tablet   0   ? miconazole (DESENEX) 2 % powder Apply 1 application topically daily as needed for itching.     ? nitroglycerin (NITROSTAT) 0.4 MG SL tablet Place 0.4 mg under the tongue every 5 (five) minutes as needed for chest pain.      ? omeprazole (PRILOSEC) 20 MG capsule Take 20 mg by mouth daily.      ? simvastatin (ZOCOR) 40 MG tablet Take 40 mg by mouth at bedtime.      ? spironolactone (ALDACTONE) 25 MG tablet Take 25 mg by mouth daily.     ? urea 10 % lotion Apply 1 application topically daily as needed.     ? warfarin (COUMADIN/JANTOVEN) 4 MG tablet Take 4 mg by mouth daily.       Facility-Administered Encounter Medications as of 4/4/2019   Medication Dose Route Frequency Provider Last Rate Last Dose   ? lidocaine 2 % jelly (XYLOCAINE)   Topical PRN Miriam Quinones, CNP   1 application at 01/10/19 0951       Review of Systems:     General: WNL  Eyes: WNL  Ears/Nose/Throat: WNL  Lungs: WNL  Heart: WNL  Stomach: WNL  Bladder: WNL  Muscle/Joints: WNL  Skin: WNL  Nervous System: WNL  Mental Health: WNL     Blood: WNL    Objective:     /72 (Patient Site: Left Arm, Patient Position: Sitting, Cuff Size: Adult Regular)   Pulse 72   Resp 16   Ht 6' (1.829 m)   Wt 199 lb (90.3 kg)   BMI 26.99 kg/m    Wt Readings from Last 5 Encounters:   04/04/19 199 lb (90.3  kg)   03/29/19 206 lb (93.4 kg)   03/22/19 203 lb (92.1 kg)   03/15/19 203 lb 4.8 oz (92.2 kg)   03/07/19 200 lb (90.7 kg)        Physical Exam:    GENERAL APPEARANCE: alert, no apparent distress  EYES: no scleral icterus  NECK: no carotid bruits or adenopathy, jugular venous pressure is less than 5 cm  CHEST: symmetric, the lungs are clear to auscultation  CARDIOVASCULAR: regular rhythm without murmur or gallop  EXTREMITIES: no cyanosis, clubbing or edema  SKIN: no xanthelasma  NEUROLOGIC: normal gait and coordination  PSYCHIATRIC: mood and affect are normal    Cardiac Testing:    HOLTER MONITOR     INTERPRETATION DATE:  12/28/2018      TEST DATE:  12/20/2018      INTERPRETATION:  Predominant rhythm is sinus rhythm with rates ranging from 56 beats  per minute to 99 beats per minute.  First-degree AV block was commonly present.   Minor sinus arrhythmia was noted, particularly at night, but there were no  clinically significant pauses.  Rare atrial premature beats were present, 57 over 24  hours.  Occasional ventricular premature beats were present 1428 or 1.5% of all  heartbeats.  There were 30 couplets, but no runs.  PVCs were predominantly unifocal  with right bundle configuration and superior axis consistent with ectopy arising  from the inferior wall of the left ventricle.  No atrial fibrillation was noted  during the monitoring period.  No diary was submitted.     CONCLUSION:  Essentially normal Holter.  Occasional ventricular premature beats  noted, but no atrial fibrillation.        HUI KHAN MD    Echocardiogram:   Results for orders placed during the hospital encounter of 09/29/18   Echo Complete [ECH10] 09/30/2018    Narrative   1.Left ventricle ejection fraction is mildly decreased. The estimated   left ventricular ejection fraction is 45%.    2.More pronounced inferolateral hypokinesis noted.    3.TAPSE is abnormal, which is consistent with abnormal right ventricular   systolic function.    4.Mild  regurgitant lesions of all 4 cardiac valves seen.    5.Mild pulmonary hypertension suggested.    6.When compared to the previous study dated 1/25/2018, overall left   ventricular ejection fraction is lower on current study, inferolateral   hypokinesis appears to be a larger distribution.          Cardiac Catheterization:   Results for orders placed during the hospital encounter of 09/29/18   Cardiac Catheterization [CATH01] 10/01/2018    Narrative   Estimated blood loss was <20 ml.    Prox Cx to Mid Cx lesion 70% stenosed.    Dist Cx lesion 90% stenosed.    Ost 1st Mrg to 1st Mrg lesion 100% stenosed.    Dist LAD-1 lesion 50% stenosed.    Dist LAD-2 lesion 90% stenosed.    Mid LAD lesion 70% stenosed.    Pressure wire/FFR was performed on the lesion. pre diagnositic: 0.82.   post diagnostic: 0.65.    Pressure wire/FFR was performed on the lesion.    Prox RCA to Dist RCA lesion 99% stenosed.    Dist Graft lesion 100% stenosed.    Origin to Dist Graft lesion 100% stenosed.             Results for orders placed during the hospital encounter of 06/27/13   NM Pharmacologic Stress Test [HIU608] 06/27/2013    Narrative See Historical Hospital Medical Record for documentation       Imaging:    No results found.    Lab Results:    Lab Results   Component Value Date    CREATININE 1.36 (H) 01/04/2019    BUN 25 01/04/2019     01/04/2019    K 4.7 01/04/2019     01/04/2019    CO2 22 01/04/2019     Lab Results   Component Value Date    CHOL 115 08/25/2017    TRIG 130 08/25/2017    HDL 26 (L) 08/25/2017    LDLDIRECT 54 07/30/2015     BNP (pg/mL)   Date Value   11/07/2018 161 (H)   11/05/2018 148 (H)   10/22/2018 307 (H)     Creatinine (mg/dL)   Date Value   01/04/2019 1.36 (H)   01/03/2019 1.63 (H)   11/07/2018 1.31 (H)   11/05/2018 1.22     Magnesium (mg/dL)   Date Value   10/10/2018 2.1   10/09/2018 2.2   10/08/2018 2.4     LDL Calculated (mg/dL)   Date Value   08/25/2017 63   04/26/2016 62   07/30/2015      Comment:      Invalid, Triglycerides >300     Lab Results   Component Value Date    WBC 8.8 01/03/2019    HGB 11.6 (L) 01/03/2019    HCT 36.6 (L) 01/03/2019    MCV 81 01/03/2019     01/03/2019           Much or all of the text in this note was generated through the use of the Dragon Dictate voice-to-text software. Errors in spelling or words which seem out of context are unintentional. Sound alike errors, in particular, may have escaped editing.

## 2021-06-27 NOTE — PROGRESS NOTES
Progress Notes by Miriam Quinones CNP at 3/15/2019  1:00 PM     Author: Miriam Quinones CNP Service: -- Author Type: Nurse Practitioner    Filed: 3/15/2019  2:28 PM Encounter Date: 3/15/2019 Status: Signed    : Miriam Quinones CNP (Nurse Practitioner)       Date of Service:3/15/2019    Chief Complaint:   Chief Complaint   Patient presents with   ? Wound Check     Lt leg ulcer and Medial knee wound       History:    The patient was last seen by me on 2/5/2019 when Silvercel was discontinued and endoform started.  A 2-layer wrap for compression was continued.  For the ulcer near the knee, a Tegaderm foam adhesive was applied.  He is having it changed weekly in our clinic.  He is tolerating the adhesive on this dressing.  He denies any pain in his ulcer.    Current Outpatient Medications   Medication Sig Dispense Refill   ? acetaminophen (TYLENOL) 325 MG tablet Take 2 tablets (650 mg total) by mouth every 6 (six) hours as needed for pain.  0   ? albuterol (PROVENTIL) 2.5 mg /3 mL (0.083 %) nebulizer solution Take 3 mL (2.5 mg total) by nebulization every 6 (six) hours as needed for wheezing or shortness of breath.  0   ? aluminum-magnesium hydroxide-simethicone (MAALOX ADVANCED) 200-200-20 mg/5 mL Susp Take 30 mL by mouth every 4 (four) hours as needed.     ? aspirin 81 mg chewable tablet Chew 1 tablet (81 mg total) daily.  0   ? bacitracin 500 unit/gram ointment Apply 1 application topically daily. To chest tube sites     ? bisacodyl (DULCOLAX) 5 mg EC tablet Take 10 mg by mouth daily as needed for constipation.     ? carbamide peroxide (DEBROX) 6.5 % otic solution Administer 2 drops into both ears at bedtime as needed.            ? cetirizine (ZYRTEC) 10 MG tablet Take 10 mg by mouth daily.     ? dextromethorphan-guaifenesin (DIABETIC TUSSIN DM)  mg/5 mL Liqd Take 10 mL by mouth 4 (four) times a day as needed.     ? diphenhydrAMINE (BENADRYL) 50 MG capsule Take 50 mg by mouth 3 (three)  times a day as needed for itching.     ? emollient (VANICREAM) Crea Apply 1 application topically 3 (three) times a day as needed (dry skin).  0   ? furosemide (LASIX) 20 MG tablet Take 20 mg by mouth daily as needed (at noon, if weight increase 2lbs in 24 hours).            ? gentamicin (GARAMYCIN) 0.1 % ointment Apply 1 application topically daily as needed.     ? glipiZIDE (GLUCOTROL) 10 MG tablet Take 10 mg by mouth daily before breakfast.     ? glipiZIDE (GLUCOTROL) 10 MG tablet Take 15 mg by mouth every evening.     ? hydrocortisone 1 % ointment Apply topically 2 (two) times a day. Apply to face for skin rash 30 g 0   ? ketotifen (ZADITOR/ZYRTEC ITCHY EYES) 0.025 % (0.035 %) ophthalmic solution Administer 1 drop to both eyes 2 (two) times a day. 10 mL 0   ? Lactobacillus acidophilus 100 mg (1 billion cell) cap Take 1 capsule by mouth 2 (two) times a day. 60 capsule 0   ? MEPHYTON 5 mg tablet   0   ? miconazole (DESENEX) 2 % powder Apply 1 application topically daily as needed for itching.     ? omeprazole (PRILOSEC) 20 MG capsule Take 20 mg by mouth daily.      ? simvastatin (ZOCOR) 40 MG tablet Take 40 mg by mouth at bedtime.      ? spironolactone (ALDACTONE) 25 MG tablet Take 25 mg by mouth daily.     ? urea 10 % lotion Apply 1 application topically daily as needed.     ? warfarin (COUMADIN/JANTOVEN) 4 MG tablet Take 4 mg by mouth daily.     ? nitroglycerin (NITROSTAT) 0.4 MG SL tablet Place 0.4 mg under the tongue every 5 (five) minutes as needed for chest pain.        Current Facility-Administered Medications   Medication Dose Route Frequency Provider Last Rate Last Dose   ? lidocaine 2 % jelly (XYLOCAINE)   Topical PRN Miriam Quinones, CNP   1 application at 01/10/19 0951       Allergies   Allergen Reactions   ? Latex    ? Penicillins Rash     Childhood rxn  -- tolerated cefazolin 10/4/18       Physical Exam:    Vitals:    03/15/19 1248   BP: 142/70   Pulse: 80   Resp: 18   Temp: 97.9  F (36.6  C)     Body mass index is 27.57 kg/m .    General:  73 y.o. male in no apparent distress.    Psychiatric:  Alert and oriented x 3.  Cooperative.   Integumentary:  Skin is uniformly warm and dry.    Lower extremity edema: minimal    Photo:          Left lateral leg Ulceration(s):     Wound bed: 100% dried exudate   Undermining no, Tunneling no   Wound Edge: attached   Periwound:  There is no erika wound erythema, induration, maceration, denudement fluctuance or warmth surrounding the ulcer(s).  Exudate: serosanguaneous            Quantity: moderate  Odor:  absent   Wound Shape regular       Left medial incisional  Ulceration(s):     Wound bed: 100% dry exudate     Undermining no, Tunneling no   Wound Edge: attached   Periwound:  There is erika wound erythema, but no induration, maceration, denudement fluctuance or warmth surrounding the ulcer(s).  Exudate: serosanguaneous            Quantity: moderate  Odor:  absent   Wound Shape regular    Labs:    Lab Results   Component Value Date    SEDRATE 21 (H) 08/31/2018     Lab Results   Component Value Date    CRP 4.0 (H) 01/03/2019     No components found for: CREATINE  Lab Results   Component Value Date    BUN 25 01/04/2019     Lab Results   Component Value Date    HGBA1C 10.2 (H) 03/05/2019         Vasc Edema 11/12/2018 12/3/2018 1/10/2019 2/28/2019 3/15/2019   Right just above MTP 24 23.5 23.2 - 23.8   Right Ankle 25 24.3 23.5 - 24.5   Right Widest Calf 39.1 36 39 - 36.8   Right Thigh Up 10cm 46.2 45.5  46 - 47   Left - just above MTP 27 26 25.5 27.5 26.5   Left Ankle 23 23.5 23 22.1 21.7   Left Widest Calf 35.1 34.8 35.4 31.1 31.5   Left Thigh Up 10cm 40.2 44.5 46  45.5 46.2       VASC Wound 10/22/18 left medial leg vein harvest (below knee) (Active)   Pre Size Length 1.3 3/15/2019 12:00 PM   Pre Size Width 0.5 3/15/2019 12:00 PM   Pre Size Depth 0.1 2/21/2019 11:00 AM   Pre Total Sq cm 0.65 2/21/2019 11:00 AM   Post Size Length 1 3/15/2019 12:00 PM   Post Size Width 0.4  3/15/2019 12:00 PM   Post Size Depth 0.4 1/22/2019  3:00 PM   Post Total Sq cm 0.7 12/3/2018  1:00 PM   Undermined no 1/22/2019  3:00 PM   Tunneling no 1/22/2019  3:00 PM   Description scabbed 3/7/2019 10:00 AM   Prodcut Used Bactroban 10/22/2018  2:00 PM       VASC Wound 11/12/18 left lateral lower leg (Active)   Pre Size Length 0.3 3/7/2019 10:00 AM   Pre Size Width 0.3 3/7/2019 10:00 AM   Pre Size Depth 0.1 3/7/2019 10:00 AM   Pre Total Sq cm 0.09 3/7/2019 10:00 AM   Post Size Length 1 3/15/2019 12:00 PM   Post Size Width 0.3 3/15/2019 12:00 PM   Post Size Depth 0.1 12/20/2018  9:00 AM   Undermined n 11/12/2018  2:00 PM   Tunneling n 11/12/2018  2:00 PM   Description weeping 12/3/2018  1:00 PM       Wound 08/11/18 Non-pressure related ulcer Leg Right;Anterior (front) (Active)       Wound 01/04/19 Non-pressure related ulcer Leg Left (Active)       Wound 01/04/19 Non-pressure related ulcer Leg Right (Active)       Incision 10/04/18 Leg Left (Active)     Imaging:    [facility] RADIOLOGY  LOCATION: [facility] OUTPATIENT SERVICES  DATE: 12/20/2018     EXAM: RESTING ANKLE-BRACHIAL INDICES (ABIs)     INDICATION: Lower extremity pain, left leg ulcers, left transmetatarsal amputation.     COMPARISON: None.     EVELYNE FINDINGS:   SEGMENTAL BP  RIGHT (mmHg)  Brachial: 134  High Thigh: 190; Index 1.36  Low Thigh: 173; Index 1.24  Calf: >254; Index -NC-  Ankle (PT): 11.; Index 0.79  Ankle (DP): 138; Index 0.99  Digit: 58; Index 0.41     LEFT (mmHg)  Brachial: 140  High Thigh: 154; Index 1.10  Low Thigh: 155; Index 1.11  Calf: 115; Index 0.82  Ankle (PT): 130; Index 0.93  Ankle (DP): 156; Index 1.11     IMPRESSION:   1. RIGHT LOWER EXTREMITY: Resting ankle-brachial index of 0.99 is likely artifactual secondary to vessel calcification. Great toe pressure of 58 mmHg is adequate for wound healing.     2. LEFT LOWER EXTREMITY: Resting ankle-brachial index of 0.93 is likely artifactual secondary to vessel calcification. Status  post left transmetatarsal amputation.     3. Given development of a new wound, further evaluation with a bilateral lower extremity ultrasound and/or CT angiogram with bilateral lower extremity runoff is suggested.         Assessment:  1. Leg ulcer, left, limited to breakdown of skin (H)     2. Venous hypertension, chronic, bilateral     3. Acquired lymphedema of lower extremity     4. Non-insulin dependent type 2 diabetes mellitus (H)         A new wound was identified today: No      Plan:    1.  Debridement of the Left lateral leg ulcer was recommended.  After consent was obtained and topical 2% Xylocaine was applied under clean conditions, and using a #15 blade,the devitalized dermal tissue was debrided for a total square centimeters of 0.7.  Following debridement, there was a decrease in the nonviable tissue. The patient tolerated the procedure without any difficulty.     2. Venous and contact dermatitis, resolved.      3.  Venous insufficiency, stable    4.  Left lateral leg ulcer, improving    5.  Left Medial leg ulcer, improving    6.  Edema, stable    7.  Diabetes, on Metformin.  6.6 A1c on 11/1/2017.      8.  Treatment:  For the medial leg ulcer, will continue with Tegaderm foam adhesive.  For the lateral left leg ulcer, will apply Endoform and cover with xerform and an ABD.  A 2-layer wrap will be applied for compression and changed weekly by nurses in our clinic.    9.  Patient will follow up with a nurse visit weekly and me in 3 weeks  for further evaluation and response to the treatment.      Miriam Quinones, APRN, CNP,  [facility]  [phone]

## 2021-06-27 NOTE — PROGRESS NOTES
Progress Notes by Raegan Maldonado LPN at 3/29/2019  1:20 PM     Author: Raegan Maldonado LPN Service: -- Author Type: Licensed Nurse    Filed: 3/29/2019  2:49 PM Encounter Date: 3/29/2019 Status: Signed    : Raegan Maldonado LPN (Licensed Nurse)                     Nurse Visit    Chief Complaint: Patient presents to clinic for assement, and treatment of their ulcer and and swelling    Dressing on Arrival 2 layer CDI.     Allergies:   Allergies   Allergen Reactions   ? Latex    ? Penicillins Rash     Childhood rxn  -- tolerated cefazolin 10/4/18       Medications:   Current Outpatient Medications:   ?  acetaminophen (TYLENOL) 325 MG tablet, Take 2 tablets (650 mg total) by mouth every 6 (six) hours as needed for pain., Disp: , Rfl: 0  ?  albuterol (PROVENTIL) 2.5 mg /3 mL (0.083 %) nebulizer solution, Take 3 mL (2.5 mg total) by nebulization every 6 (six) hours as needed for wheezing or shortness of breath., Disp: , Rfl: 0  ?  aluminum-magnesium hydroxide-simethicone (MAALOX ADVANCED) 200-200-20 mg/5 mL Susp, Take 30 mL by mouth every 4 (four) hours as needed., Disp: , Rfl:   ?  aspirin 81 mg chewable tablet, Chew 1 tablet (81 mg total) daily., Disp: , Rfl: 0  ?  bacitracin 500 unit/gram ointment, Apply 1 application topically daily. To chest tube sites, Disp: , Rfl:   ?  bisacodyl (DULCOLAX) 5 mg EC tablet, Take 10 mg by mouth daily as needed for constipation., Disp: , Rfl:   ?  carbamide peroxide (DEBROX) 6.5 % otic solution, Administer 2 drops into both ears at bedtime as needed.    , Disp: , Rfl:   ?  cetirizine (ZYRTEC) 10 MG tablet, Take 10 mg by mouth daily., Disp: , Rfl:   ?  dextromethorphan-guaifenesin (DIABETIC TUSSIN DM)  mg/5 mL Liqd, Take 10 mL by mouth 4 (four) times a day as needed., Disp: , Rfl:   ?  diphenhydrAMINE (BENADRYL) 50 MG capsule, Take 50 mg by mouth 3 (three) times a day as needed for itching., Disp: , Rfl:   ?  emollient (VANICREAM) Crea, Apply 1  application topically 3 (three) times a day as needed (dry skin)., Disp: , Rfl: 0  ?  furosemide (LASIX) 20 MG tablet, Take 20 mg by mouth daily as needed (at noon, if weight increase 2lbs in 24 hours).    , Disp: , Rfl:   ?  gentamicin (GARAMYCIN) 0.1 % ointment, Apply 1 application topically daily as needed., Disp: , Rfl:   ?  glipiZIDE (GLUCOTROL) 10 MG tablet, Take 10 mg by mouth daily before breakfast., Disp: , Rfl:   ?  glipiZIDE (GLUCOTROL) 10 MG tablet, Take 15 mg by mouth every evening., Disp: , Rfl:   ?  hydrocortisone 1 % ointment, Apply topically 2 (two) times a day. Apply to face for skin rash, Disp: 30 g, Rfl: 0  ?  ketotifen (ZADITOR/ZYRTEC ITCHY EYES) 0.025 % (0.035 %) ophthalmic solution, Administer 1 drop to both eyes 2 (two) times a day., Disp: 10 mL, Rfl: 0  ?  Lactobacillus acidophilus 100 mg (1 billion cell) cap, Take 1 capsule by mouth 2 (two) times a day., Disp: 60 capsule, Rfl: 0  ?  MEPHYTON 5 mg tablet, , Disp: , Rfl: 0  ?  miconazole (DESENEX) 2 % powder, Apply 1 application topically daily as needed for itching., Disp: , Rfl:   ?  nitroglycerin (NITROSTAT) 0.4 MG SL tablet, Place 0.4 mg under the tongue every 5 (five) minutes as needed for chest pain. , Disp: , Rfl:   ?  omeprazole (PRILOSEC) 20 MG capsule, Take 20 mg by mouth daily. , Disp: , Rfl:   ?  simvastatin (ZOCOR) 40 MG tablet, Take 40 mg by mouth at bedtime. , Disp: , Rfl:   ?  spironolactone (ALDACTONE) 25 MG tablet, Take 25 mg by mouth daily., Disp: , Rfl:   ?  urea 10 % lotion, Apply 1 application topically daily as needed., Disp: , Rfl:   ?  warfarin (COUMADIN/JANTOVEN) 4 MG tablet, Take 4 mg by mouth daily., Disp: , Rfl:     Current Facility-Administered Medications:   ?  lidocaine 2 % jelly (XYLOCAINE), , Topical, PRN, Stacie, Miriam Johnson, CNP, 1 application at 01/10/19 0951    Vital Signs: /68   Pulse 84   Temp 98.5  F (36.9  C)   Resp 18   Ht 6' (1.829 m)   Wt 206 lb (93.4 kg)   BMI 27.94 kg/m         Assessment:    General:  Patient presents to clinic in no apparent distress.  Psychiatric:  Alert and oriented x3.   Lower extremity:  edema is present.    Integumentary:  Skin is uniformly warm, dry and pink.    Wound size:   VASC Wound 10/22/18 left medial leg vein harvest (below knee) (Active)   Pre Size Length 1 3/22/2019 12:00 PM   Pre Size Width 0.5 3/22/2019 12:00 PM   Pre Size Depth 0.1 3/22/2019 12:00 PM   Pre Total Sq cm 0.25 3/22/2019 12:00 PM   Post Size Length 1 3/15/2019 12:00 PM   Post Size Width 0.4 3/15/2019 12:00 PM   Post Size Depth 0.01 3/15/2019 12:00 PM   Post Total Sq cm 0.4 3/15/2019 12:00 PM   Undermined no 2019  3:00 PM   Tunneling no 2019  3:00 PM   Description scab 3/29/2019  1:00 PM   Prodcut Used Bactroban 10/22/2018  2:00 PM       VASC Wound 18 left lateral lower leg (Active)   Pre Size Length 1 3/29/2019  1:00 PM   Pre Size Width 0.5 3/29/2019  1:00 PM   Pre Size Depth 0.1 3/29/2019  1:00 PM   Pre Total Sq cm 0.5 3/29/2019  1:00 PM   Post Size Length 1 3/15/2019 12:00 PM   Post Size Width 0.3 3/15/2019 12:00 PM   Post Size Depth 0.01 3/15/2019 12:00 PM   Post Total Sq cm 0.3 3/15/2019 12:00 PM   Undermined n 2018  2:00 PM   Tunneling n 2018  2:00 PM   Description weeping 12/3/2018  1:00 PM       Wound 18 Non-pressure related ulcer Leg Right;Anterior (front) (Active)       Wound 19 Non-pressure related ulcer Leg Left (Active)       Wound 19 Non-pressure related ulcer Leg Right (Active)       Incision 10/04/18 Leg Left (Active)      Undermining is not present.    The periwoundskin is WNL      Plan:         1. Patient will f/u in 1 week         2. Treatment provided will include irrigation and dressings to promote autolytic debridement and will be as listed below     Cleansed with: Normal Saline    Protected skin with: Remedy Skin Repair    Dressings Applied: xeroform and Collagen    Compression Applied to the Left Le-Layer  Coban    Compression Applied to the Right Leg: None    Offloading applied: None    Trial Products: no  Provider notified regarding concerns: no  Treatment Changes: no    Educational Barriers: none  Taught Regarding: Acitivity, Compliance, Compression and Dressing  Teaching Method: DC sheet and explanation

## 2021-06-27 NOTE — PROGRESS NOTES
Progress Notes by Danni Hyatt LPN at 1/15/2019 12:40 PM     Author: Danni Hyatt LPN Service: -- Author Type: Licensed Nurse    Filed: 1/15/2019  2:54 PM Encounter Date: 1/15/2019 Status: Signed    : Danni Hyatt LPN (Licensed Nurse)       Nurse Visit      Date of Service:1/15/2019    Chief Complaint: Patient presents to clinic for evaluation of their ulcer and swelling    Dressing on Arrival 2 layer light was removed prior to appointment per pt. Jes duncan was on   Bilat. Legs, with plastic tape layered on top of each wound.                              Allergies: Latex and Penicillins    Assessment:    /68   Pulse 60   Temp 98.5  F (36.9  C)   Resp 20     General:  Patient presents to clinic in no apparent distress.  Psychiatric:  Alert and oriented x3.   Lower extremity:  edema is present.    Integumentary:  Skin is uniformly warm, dry and pink.    Wound size:   VASC Wound 10/22/18 left medial leg vein harvest (below knee) (Active)   Pre Size Length 0.8 1/15/2019  1:00 PM   Pre Size Width 0.6 1/15/2019  1:00 PM   Pre Size Depth 0.1 1/15/2019  1:00 PM   Pre Total Sq cm 0.48 1/15/2019  1:00 PM   Post Size Length 0.8 12/20/2018  9:00 AM   Post Size Width 0.8 12/20/2018  9:00 AM   Post Size Depth 0.1 12/20/2018  9:00 AM   Post Total Sq cm 0.7 12/3/2018  1:00 PM   Description 1.7 diagonally @ 1 & 7 o'clock 1/10/2019  9:00 AM   Prodcut Used Bactroban 10/22/2018  2:00 PM       VASC Wound 11/12/18 left lateral lower leg (Active)   Pre Size Length 2.7 1/15/2019  1:00 PM   Pre Size Width 2.2 1/15/2019  1:00 PM   Pre Size Depth 0.2 1/15/2019  1:00 PM   Pre Total Sq cm 5.94 1/15/2019  1:00 PM   Post Size Length 2.8 1/10/2019  9:00 AM   Post Size Width 2.2 1/10/2019  9:00 AM   Post Size Depth 0.1 12/20/2018  9:00 AM   Undermined n 11/12/2018  2:00 PM   Tunneling n 11/12/2018  2:00 PM   Description weeping 12/3/2018  1:00 PM       VASC Wound 01/15/19 right heel (Active)   Pre Size Length 1 1/15/2019   1:00 PM   Pre Size Width 0.2 1/15/2019  1:00 PM   Pre Size Depth 0.1 1/15/2019  1:00 PM   Pre Total Sq cm 0.2 1/15/2019  1:00 PM       Wound 08/11/18 Non-pressure related ulcer Leg Right;Anterior (front) (Active)       Wound 01/04/19 Non-pressure related ulcer Leg Left (Active)       Wound 01/04/19 Non-pressure related ulcer Leg Right (Active)       Incision 10/04/18 Leg Left (Active)      Undermining is not present.    The periwoundskin ismacerated.             1. Patient will Follow up will be Friday 1/17/2019.         2. Treatment provided:per  orders     Cleansed with: normal saline  Protected skin with: skin prep   Applied: ADB pads  Packed/filled with: left lower wound filled with medihoney,  Covered with: ABD pads  Secured with: 2 layer lite on left leg, double tubigrip on right. Encouraged pt. To wear his velcro's on right leg, also advised  Pt. To get new shoes due to holes in soles of shoes.

## 2021-06-27 NOTE — PROGRESS NOTES
Progress Notes by Heather Robertson NP at 5/29/2019  1:20 PM     Author: Heather Robertson NP Service: -- Author Type: Nurse Practitioner    Filed: 5/29/2019  2:17 PM Encounter Date: 5/29/2019 Status: Signed    : Heather Robertson NP (Nurse Practitioner)       Follow up Vascular Visit       Date of Service:5/29/2019    Date Last Seen: Visit date not found; 5/16/2019    Chief Complaint: new left leg ulcer; edema    History:   Past Medical History:   Diagnosis Date   ? Atopic keratoconjunctivitis    ? Atrial flutter (H)    ? Bilateral lower leg cellulitis 8/31/2018   ? Candidiasis of perineum 1/3/2018   ? Coronary artery disease due to lipid rich plaque 2000    CABG x2   ? Diabetic ulcer of both feet (H) 10/31/2017   ? Dyslexia    ? Dyslipidemia, goal LDL below 70 2000   ? Essential hypertension    ? Gangrene of left foot (H)    ? MRSA (methicillin resistant Staphylococcus aureus)    ? Neuropathy (H)    ? Non-STEMI (non-ST elevated myocardial infarction) (H)    ? Paroxysmal Atrial Fibrillation     Brian Moe: 8/2011 Cardioversion; CHADS2 VASC = 5; he is on warfarin and sotalol    ? Pneumonia 6/26/2018   ? Type 2 diabetes mellitus, without long-term current use of insulin (H)    ? Unable to function independently 11/13/2017       Pt returns to the [facility] with regards to their new left leg wound and leg swelling. Arrives with sister Miriam. Pt is typically seen by Miriam Quinones CNP I am taking over the care of this patient. Pt has been struggling with ulcerations to the left leg; these have been due to surgery, self-inflicted; trauma; and spontaneous. Last visit his wounds were all healed. They sent him out in double tubular compression bilaterally. He reports that he is not been consistent with lotioning his legs; does this once every 2 weeks; he states that this morning he was scrubbing his legs and caused new wounds.        Allergies: Latex and  Penicillins    Physical Exam:    /70   Pulse 88   Temp 99.1  F (37.3  C)   Resp 18     General:  Patient presents to clinic in no apparent distress.  Head: normocephalic atraumatic  Psychiatric:  Alert and oriented x3.   Respiratory: unlabored breathing; no cough  Integumentary:  Skin is uniformly warm, dry and pink.    Wound #1 Location: left lateral leg  Size: 2L x 1.5W x 0.1cmdepth.  No sinus tract present, Wound base: initially covered with fibrinous material this was debrided to reveal nearly 100% viable wound bed  No undermining present. Periwound: no denudement, erythema, induration, maceration or warmth.        Wound #2 Location: left lateral leg superior  Size: 1.3x1.2 x 0.1cmdepth.  No sinus tract present, Wound base: initially covered with fibrinous material this was debrided to reveal nearly 100% viable wound bed  No undermining present. Periwound: no denudement, erythema, induration, maceration or warmth.      Skin noted to be excessively dry bilaterally      Circumferential volume measures:    Vasc Edema 1/10/2019 2/28/2019 3/15/2019 4/18/2019 5/29/2019   Right just above MTP 23.2 - 23.8 - 24   Right Ankle 23.5 - 24.5 - 24.4   Right Widest Calf 39 - 36.8 - 35.2   Right Thigh Up 10cm 46 - 47 - -   Left - just above MTP 25.5 27.5 26.5 25 -   Left Ankle 23 22.1 21.7 21.5 22.7   Left Widest Calf 35.4 31.1 31.5 32 33.3   Left Thigh Up 10cm 46  45.5 46.2 45.5 -       Ulceration(s)/Wound(s):     VASC Wound 10/22/18 left medial leg vein harvest (below knee) (Active)   Pre Size Length 0.5 4/11/2019  1:00 PM   Pre Size Width 0.4 4/11/2019  1:00 PM   Pre Size Depth 0.1 4/11/2019  1:00 PM   Pre Total Sq cm 0.2 4/11/2019  1:00 PM   Post Size Length 0 4/5/2019 12:00 PM   Post Size Width 0 4/5/2019 12:00 PM   Post Size Depth 0 4/5/2019 12:00 PM   Post Total Sq cm 0.4 3/15/2019 12:00 PM   Undermined no 4/11/2019  1:00 PM   Tunneling no 4/11/2019  1:00 PM   Description scab 5/16/2019 12:00 PM   Prodcut Used  Bactroban 10/22/2018  2:00 PM       VASC Wound 05/02/19 left mid shin (Active)   Pre Size Length 0 5/16/2019 12:00 PM   Pre Size Width 0 5/16/2019 12:00 PM   Pre Size Depth 0 5/16/2019 12:00 PM   Pre Total Sq cm 0 5/9/2019  1:00 PM       VASC Wound 05/02/19 left medial ankle (Active)   Pre Size Length 0 5/16/2019 12:00 PM   Pre Size Width 0 5/16/2019 12:00 PM   Pre Size Depth 0 5/16/2019 12:00 PM   Pre Total Sq cm 0 5/16/2019 12:00 PM       VASC Wound 05/02/19 left middle medial leg (Active)   Pre Size Length 0 5/9/2019  1:00 PM   Pre Size Width 0 5/9/2019  1:00 PM   Pre Size Depth 0 5/9/2019  1:00 PM   Pre Total Sq cm 0 5/9/2019  1:00 PM       VASC Wound 05/29/19 Lt lateral shin superior (Active)   Pre Size Length 2 5/29/2019  1:00 PM   Pre Size Width 1.5 5/29/2019  1:00 PM   Pre Size Depth 0.1 5/29/2019  1:00 PM   Pre Total Sq cm 3 5/29/2019  1:00 PM       VASC Wound 05/29/19 Lt lateral shin distal (Active)   Pre Size Length 1.3 5/29/2019  1:00 PM   Pre Size Width 1.2 5/29/2019  1:00 PM   Pre Size Depth 0.1 5/29/2019  1:00 PM   Pre Total Sq cm 1.56 5/29/2019  1:00 PM       Wound 08/11/18 Non-pressure related ulcer Leg Right;Anterior (front) (Active)       Wound 01/04/19 Non-pressure related ulcer Leg Left (Active)       Wound 01/04/19 Non-pressure related ulcer Leg Right (Active)       Incision 10/04/18 Leg Left (Active)        Lab Values    Lab Results   Component Value Date    SEDRATE 21 (H) 08/31/2018     Lab Results   Component Value Date    CREATININE 1.36 (H) 01/04/2019     Lab Results   Component Value Date    HGBA1C 7.8 (H) 05/21/2019     Lab Results   Component Value Date    BUN 25 01/04/2019     Lab Results   Component Value Date    ALBUMIN 3.9 01/03/2019     No results found for: [id number]          Impression:  1. Leg ulcer, left, limited to breakdown of skin (H)     2. Acquired lymphedema of lower extremity     3. Ulcer of left lower extremity with fat layer exposed (H)     4. Non-insulin dependent  type 2 diabetes mellitus (H)     5. Venous hypertension, chronic, bilateral     6. Ischemic cardiomyopathy       5/29/19 left lateral shin             Are any of these wounds new today: Yes; Location: left shin    Assessment/Plan:          1. Debridement: After discussion of risk factors and verbal consent was obtained 2% Lidocaine HCL jelly was applied, under clean conditions, the left shin ulceration(s) were debrided using currette. Devitalized and nonviable tissue, along with any fibrin and slough, was removed to improve granulation tissue formation, stimulate wound healing, decrease overall bacteria load, disrupt biofilm formation and decrease edge senescence.  Total excisional debridement was 4.56 sq cm from the epidermis/dermis area and into the subcutaneous tissue with a depth of 0.1 cm.   Ulcers were improved afterwards and .  Measures were unchanged after debridement.                   2. Edema: will continue with the double tubular compression bilaterally. The compression wraps were applied today in clinic.           3.  Wound treatment: wound treatment will include irrigation and dressings to promote autolytic debridement which will include:silvercel; ABD; rolled gauze; change weekly; we discussed the importance of skin care; needs to be applying lotion to the right leg once per day; I would like him to leave the left shin alone; let us manage the wound care; he is self inflicting many of the wounds with careless behaviors.           4. Nutrition: diabetic           5. Offloading: na     Patient will follow up with nurse visit in 1 weeks for reevaluation I will see in 3-4 weeks. They were instructed to call the clinic sooner with any signs or symptoms of infection or any further questions/concerns. Answered all questions.    Heather Robertson DNP, RN, CNP, [facility]  [phone]        This note was electronically signed by Heather Robertson

## 2021-06-27 NOTE — PROGRESS NOTES
Progress Notes by Indira Freed LPN at 5/9/2019  1:20 PM     Author: Indira Freed LPN Service: -- Author Type: Licensed Nurse    Filed: 5/9/2019  2:00 PM Encounter Date: 5/9/2019 Status: Signed    : Indira Freed LPN (Licensed Nurse)       Nurse Visit                Chief Complaint: Patient presents to clinic for assessment and treatment of their ulcer and and swelling    Dressing on Arrival: 2 layer on left leg, ABD pad, xeroform, endoform    Patient came in today for dressing change and 2 layer re-wrap per order from Miriam Quinones CNP. Patient rated pain 0 out of 10. Removed dressings and cleansed skin with Remedy skin cleanser. Applied lotion to intact skin. Wounds appear healed. Discussed with Miriam the new plan of care, orders written per Miriam. Re-wrapped 2 layer compression wrap to left leg. Patient tolerated dressing change well.     Allergies:   Allergies   Allergen Reactions   ? Latex    ? Penicillins Rash     Childhood rxn  -- tolerated cefazolin 10/4/18       Medications:   Current Outpatient Medications:   ?  acetaminophen (TYLENOL) 325 MG tablet, Take 2 tablets (650 mg total) by mouth every 6 (six) hours as needed for pain., Disp: , Rfl: 0  ?  albuterol (PROVENTIL) 2.5 mg /3 mL (0.083 %) nebulizer solution, Take 3 mL (2.5 mg total) by nebulization every 6 (six) hours as needed for wheezing or shortness of breath., Disp: , Rfl: 0  ?  aluminum-magnesium hydroxide-simethicone (MAALOX ADVANCED) 200-200-20 mg/5 mL Susp, Take 30 mL by mouth every 4 (four) hours as needed., Disp: , Rfl:   ?  aspirin 81 mg chewable tablet, Chew 1 tablet (81 mg total) daily., Disp: , Rfl: 0  ?  bacitracin 500 unit/gram ointment, Apply 1 application topically daily. To chest tube sites, Disp: , Rfl:   ?  bisacodyl (DULCOLAX) 5 mg EC tablet, Take 10 mg by mouth daily as needed for constipation., Disp: , Rfl:   ?  carbamide peroxide (DEBROX) 6.5 % otic solution, Administer 2 drops into both ears at bedtime  as needed.    , Disp: , Rfl:   ?  cetirizine (ZYRTEC) 10 MG tablet, Take 10 mg by mouth daily., Disp: , Rfl:   ?  dextromethorphan-guaifenesin (DIABETIC TUSSIN DM)  mg/5 mL Liqd, Take 10 mL by mouth 4 (four) times a day as needed., Disp: , Rfl:   ?  diphenhydrAMINE (BENADRYL) 50 MG capsule, Take 50 mg by mouth 3 (three) times a day as needed for itching., Disp: , Rfl:   ?  emollient (VANICREAM) Crea, Apply 1 application topically 3 (three) times a day as needed (dry skin)., Disp: , Rfl: 0  ?  furosemide (LASIX) 20 MG tablet, Take 20 mg by mouth daily as needed (at noon, if weight increase 2lbs in 24 hours).    , Disp: , Rfl:   ?  gentamicin (GARAMYCIN) 0.1 % ointment, Apply 1 application topically daily as needed., Disp: , Rfl:   ?  glipiZIDE (GLUCOTROL) 10 MG tablet, Take 10 mg by mouth daily before breakfast., Disp: , Rfl:   ?  glipiZIDE (GLUCOTROL) 10 MG tablet, Take 15 mg by mouth every evening., Disp: , Rfl:   ?  hydrocortisone 1 % ointment, Apply topically 2 (two) times a day. Apply to face for skin rash, Disp: 30 g, Rfl: 0  ?  ketotifen (ZADITOR/ZYRTEC ITCHY EYES) 0.025 % (0.035 %) ophthalmic solution, Administer 1 drop to both eyes 2 (two) times a day., Disp: 10 mL, Rfl: 0  ?  Lactobacillus acidophilus 100 mg (1 billion cell) cap, Take 1 capsule by mouth 2 (two) times a day., Disp: 60 capsule, Rfl: 0  ?  MEPHYTON 5 mg tablet, , Disp: , Rfl: 0  ?  miconazole (DESENEX) 2 % powder, Apply 1 application topically daily as needed for itching., Disp: , Rfl:   ?  nitroglycerin (NITROSTAT) 0.4 MG SL tablet, Place 0.4 mg under the tongue every 5 (five) minutes as needed for chest pain. , Disp: , Rfl:   ?  omeprazole (PRILOSEC) 20 MG capsule, Take 20 mg by mouth daily. , Disp: , Rfl:   ?  simvastatin (ZOCOR) 40 MG tablet, Take 40 mg by mouth at bedtime. , Disp: , Rfl:   ?  spironolactone (ALDACTONE) 25 MG tablet, Take 25 mg by mouth daily., Disp: , Rfl:   ?  urea 10 % lotion, Apply 1 application topically daily  as needed., Disp: , Rfl:   ?  warfarin (COUMADIN/JANTOVEN) 4 MG tablet, Take 4 mg by mouth daily., Disp: , Rfl:     Current Facility-Administered Medications:   ?  lidocaine 2 % jelly (XYLOCAINE), , Topical, PRN, Stacie, Miriam Johnson, CNP, 1 application at 05/02/19 1400    Vital Signs: There were no vitals taken for this visit.      Assessment:    General:  Patient presents to clinic in no apparent distress.  Psychiatric:  Alert and oriented .   Lower extremity:  edema is present.    Integumentary:  Skin is uniformly warm, dry and pink.    Wound size:   VASC Wound 10/22/18 left medial leg vein harvest (below knee) (Active)   Pre Size Length 0.5 4/11/2019  1:00 PM   Pre Size Width 0.4 4/11/2019  1:00 PM   Pre Size Depth 0.1 4/11/2019  1:00 PM   Pre Total Sq cm 0.2 4/11/2019  1:00 PM   Post Size Length 0 4/5/2019 12:00 PM   Post Size Width 0 4/5/2019 12:00 PM   Post Size Depth 0 4/5/2019 12:00 PM   Post Total Sq cm 0.4 3/15/2019 12:00 PM   Undermined no 4/11/2019  1:00 PM   Tunneling no 4/11/2019  1:00 PM   Description scab 5/2/2019  1:55 PM   Prodcut Used Bactroban 10/22/2018  2:00 PM       VASC Wound 11/12/18 left lateral lower leg (Active)   Pre Size Length 0.9 4/18/2019  2:00 PM   Pre Size Width 0.9 4/18/2019  2:00 PM   Pre Size Depth 0.1 4/18/2019  2:00 PM   Pre Total Sq cm 0.81 4/18/2019  2:00 PM   Post Size Length 0.5 4/18/2019  2:00 PM   Post Size Width 0.8 4/18/2019  2:00 PM   Post Size Depth 0.01 3/15/2019 12:00 PM   Post Total Sq cm 0 4/5/2019 12:00 PM   Undermined no 4/11/2019  1:00 PM   Tunneling no 4/11/2019  1:00 PM   Description scabbed 4/25/2019  1:00 PM   Prodcut Used ABD Pad 4/25/2019  1:00 PM       VASC Wound 04/18/19 left lateral lower leg superior (Active)   Pre Size Length 0 5/2/2019  1:55 PM   Pre Size Width 0 5/2/2019  1:55 PM   Pre Size Depth 0 5/2/2019  1:55 PM   Pre Total Sq cm 0 5/2/2019  1:55 PM   Post Size Length 0.5 4/18/2019  2:00 PM   Post Size Width 0.5 4/18/2019  2:00 PM    Undermined n 2019  1:00 PM   Tunneling n 2019  1:00 PM   Description closed 2019  1:55 PM       VASC Wound 19 left mid shin (Active)   Pre Size Length 0.7 2019  1:55 PM   Pre Size Width 0.7 2019  1:55 PM   Pre Size Depth 0.1 2019  1:55 PM   Pre Total Sq cm 0.49 2019  1:55 PM       VASC Wound 19 left medial ankle (Active)   Pre Size Length 0.9 2019  1:55 PM   Pre Size Width 1.7 2019  1:55 PM   Pre Size Depth 0.1 2019  1:55 PM   Pre Total Sq cm 1.53 2019  1:55 PM       VASC Wound 19 left middle medial leg (Active)   Pre Size Length 0.6 2019  1:55 PM   Pre Size Width 0.6 2019  1:55 PM   Pre Size Depth 0.1 2019  1:55 PM   Pre Total Sq cm 0.36 2019  1:55 PM       Wound 18 Non-pressure related ulcer Leg Right;Anterior (front) (Active)       Wound 19 Non-pressure related ulcer Leg Left (Active)       Wound 19 Non-pressure related ulcer Leg Right (Active)       Incision 10/04/18 Leg Left (Active)      Undermining is not present.    The periwoundskin is WNL     Vasc Edema 12/3/2018 1/10/2019 2019 3/15/2019 2019   Right just above MTP 23.5 23.2 - 23.8 -   Right Ankle 24.3 23.5 - 24.5 -   Right Widest Calf 36 39 - 36.8 -   Right Thigh Up 10cm 45.5  46 - 47 -   Left - just above MTP 26 25.5 27.5 26.5 25   Left Ankle 23.5 23 22.1 21.7 21.5   Left Widest Calf 34.8 35.4 31.1 31.5 32   Left Thigh Up 10cm 44.5 46  45.5 46.2 45.5       Plan:         1. Patient will follow up in 1 week          2. Treatment provided will include irrigation and dressings to promote autolytic debridement and will be as listed below     Cleansed with: Remedy skin cleanser    Protected skin with: Remedy Skin Repair    Dressings Applied: NA    Compression Applied to the Left Le-Layer Coban    Compression Applied to the Right Leg: NA    Offloading applied: None    Trial Products: no  Provider notified regarding concerns: yes  Treatment Changes:  yes    Educational Barriers: none  Taught Regarding: Acitivity, Compliance, Compression, Dressing and Hygiene  Teaching Method: Exaplanation and DC sheet

## 2021-06-27 NOTE — PROGRESS NOTES
Progress Notes by Margaux Guajardo LPN at 2/21/2019 12:40 PM     Author: Margaux Guajardo LPN Service: -- Author Type: Licensed Nurse    Filed: 2/21/2019 12:56 PM Encounter Date: 2/21/2019 Status: Signed    : Margaux Guajardo LPN (Licensed Nurse)               Nurse Visit    Chief Complaint: Patient presents to clinic for assement, and treatment of their ulcer and and swelling    Dressing on Arrival: 2 layer - intact    Allergies:   Allergies   Allergen Reactions   ? Latex    ? Penicillins Rash     Childhood rxn  -- tolerated cefazolin 10/4/18       Medications:   Current Outpatient Medications:   ?  acetaminophen (TYLENOL) 325 MG tablet, Take 2 tablets (650 mg total) by mouth every 6 (six) hours as needed for pain., Disp: , Rfl: 0  ?  albuterol (PROVENTIL) 2.5 mg /3 mL (0.083 %) nebulizer solution, Take 3 mL (2.5 mg total) by nebulization every 6 (six) hours as needed for wheezing or shortness of breath., Disp: , Rfl: 0  ?  aluminum-magnesium hydroxide-simethicone (MAALOX ADVANCED) 200-200-20 mg/5 mL Susp, Take 30 mL by mouth every 4 (four) hours as needed., Disp: , Rfl:   ?  aspirin 81 mg chewable tablet, Chew 1 tablet (81 mg total) daily., Disp: , Rfl: 0  ?  bacitracin 500 unit/gram ointment, Apply 1 application topically daily. To chest tube sites, Disp: , Rfl:   ?  bisacodyl (DULCOLAX) 5 mg EC tablet, Take 10 mg by mouth daily as needed for constipation., Disp: , Rfl:   ?  carbamide peroxide (DEBROX) 6.5 % otic solution, Administer 2 drops into both ears at bedtime as needed.    , Disp: , Rfl:   ?  cetirizine (ZYRTEC) 10 MG tablet, Take 10 mg by mouth daily., Disp: , Rfl:   ?  dextromethorphan-guaifenesin (DIABETIC TUSSIN DM)  mg/5 mL Liqd, Take 10 mL by mouth 4 (four) times a day as needed., Disp: , Rfl:   ?  diphenhydrAMINE (BENADRYL) 50 MG capsule, Take 50 mg by mouth 3 (three) times a day as needed for itching., Disp: , Rfl:   ?  emollient (VANICREAM) Crea, Apply 1 application topically  3 (three) times a day as needed (dry skin)., Disp: , Rfl: 0  ?  furosemide (LASIX) 20 MG tablet, Take 20 mg by mouth daily as needed (at noon, if weight increase 2lbs in 24 hours).    , Disp: , Rfl:   ?  gentamicin (GARAMYCIN) 0.1 % ointment, Apply 1 application topically daily as needed., Disp: , Rfl:   ?  glipiZIDE (GLUCOTROL) 10 MG tablet, Take 10 mg by mouth daily before breakfast., Disp: , Rfl:   ?  glipiZIDE (GLUCOTROL) 10 MG tablet, Take 15 mg by mouth every evening., Disp: , Rfl:   ?  hydrocortisone 1 % ointment, Apply topically 2 (two) times a day. Apply to face for skin rash, Disp: 30 g, Rfl: 0  ?  ketotifen (ZADITOR/ZYRTEC ITCHY EYES) 0.025 % (0.035 %) ophthalmic solution, Administer 1 drop to both eyes 2 (two) times a day., Disp: 10 mL, Rfl: 0  ?  Lactobacillus acidophilus 100 mg (1 billion cell) cap, Take 1 capsule by mouth 2 (two) times a day., Disp: 60 capsule, Rfl: 0  ?  MEPHYTON 5 mg tablet, , Disp: , Rfl: 0  ?  miconazole (DESENEX) 2 % powder, Apply 1 application topically daily as needed for itching., Disp: , Rfl:   ?  nitroglycerin (NITROSTAT) 0.4 MG SL tablet, Place 0.4 mg under the tongue every 5 (five) minutes as needed for chest pain. , Disp: , Rfl:   ?  omeprazole (PRILOSEC) 20 MG capsule, Take 20 mg by mouth daily. , Disp: , Rfl:   ?  simvastatin (ZOCOR) 40 MG tablet, Take 40 mg by mouth at bedtime. , Disp: , Rfl:   ?  spironolactone (ALDACTONE) 25 MG tablet, Take 25 mg by mouth daily., Disp: , Rfl:   ?  urea 10 % lotion, Apply 1 application topically daily as needed., Disp: , Rfl:   ?  warfarin (COUMADIN/JANTOVEN) 4 MG tablet, Take 4 mg by mouth daily., Disp: , Rfl:     Current Facility-Administered Medications:   ?  lidocaine 2 % jelly (XYLOCAINE), , Topical, PRN, O'Gardenia, Miriam Johnson, CNP, 1 application at 01/10/19 0951    Vital Signs: /74 (Patient Site: Left Arm, Patient Position: Sitting, Cuff Size: Adult Regular)   Pulse 68   Temp 97.5  F (36.4  C) (Oral)   Resp 16   Ht 6'  "(1.829 m)   Wt 200 lb (90.7 kg)   BMI 27.12 kg/m        Assessment:    General:  Patient presents to clinic in no apparent distress.  Psychiatric:  Alert and oriented x3.   Lower extremity:  edema is present.    Integumentary:  Skin is uniformly warm, dry and pink.    Wound size:   VASC Wound 10/22/18 left medial leg vein harvest (below knee) (Active)   Pre Size Length 1.3 2/21/2019 11:00 AM   Pre Size Width 0.5 2/21/2019 11:00 AM   Pre Size Depth 0.1 2/21/2019 11:00 AM   Pre Total Sq cm 0.65 2/21/2019 11:00 AM   Post Size Length 2.4 2/5/2019  2:00 PM   Post Size Width 0.9 2/5/2019  2:00 PM   Post Size Depth 0.4 1/22/2019  3:00 PM   Post Total Sq cm 0.7 12/3/2018  1:00 PM   Undermined no 1/22/2019  3:00 PM   Tunneling no 1/22/2019  3:00 PM   Description 0.6@1700 1/18/2019 12:00 PM   Prodcut Used Bactroban 10/22/2018  2:00 PM       VASC Wound 11/12/18 left lateral lower leg (Active)   Pre Size Length 2 2/21/2019 11:00 AM   Pre Size Width 1.5 2/21/2019 11:00 AM   Pre Size Depth 0.1 2/21/2019 11:00 AM   Pre Total Sq cm 3 2/21/2019 11:00 AM   Post Size Length 2 2/5/2019  2:00 PM   Post Size Width 1.8 2/5/2019  2:00 PM   Post Size Depth 0.1 12/20/2018  9:00 AM   Undermined n 11/12/2018  2:00 PM   Tunneling n 11/12/2018  2:00 PM   Description weeping 12/3/2018  1:00 PM       Wound 08/11/18 Non-pressure related ulcer Leg Right;Anterior (front) (Active)       Wound 01/04/19 Non-pressure related ulcer Leg Left (Active)       Wound 01/04/19 Non-pressure related ulcer Leg Right (Active)       Incision 10/04/18 Leg Left (Active)      Undermining is not present.    The periwoundskin is WNL      Plan:         1. Patient will in 1 weeks         2. Treatment provided will include irrigation and dressings to promote autolytic debridement and will be as listed below     Cleansed with: Wound Cleanser    Protected skin with: Remedy Skin Repair    Dressings Applied: endoform, silvercell, Foam Adhesive and ABD's\" \"Packing " Strips    Compression Applied to the Left Le-Layer Coban    Compression Applied to the Right Leg: None    Offloading applied: None    Trial Products: no  Provider notified regarding concerns: no  Treatment Changes: no    Educational Barriers: none  Taught Regarding: Acitivity, Compliance, Compression and Dressing  Teaching Method: Exaplanation and Handout

## 2021-06-29 NOTE — PROGRESS NOTES
Progress Notes by Brian Moe MD at 11/12/2020  4:10 PM     Author: Brian Moe MD Service: -- Author Type: Physician    Filed: 11/12/2020  4:47 PM Encounter Date: 11/12/2020 Status: Signed    : Brian Moe MD (Physician)             Assessment:    1. Coronary artery disease due to lipid rich plaque     2. Heart failure with preserved ejection fraction, NYHA class II (H)     3. Persistent atrial fibrillation (H)     4. Dyslipidemia, goal LDL below 70     5. Chronic kidney disease, stage 3         Plan:    1. Continue current cardiovascular medical therapy.  2. Return to see Dr. Moe in 1 year.    An After Visit Summary was printed and given to the patient.    Subjective:    Ever Crane returned for a planned annual follow up visit.    Ever states he is still at assisted living where he gets regular visits from his primary care team.  He reports that he drove himself to this visit today.  He has not used any nitroglycerin since our last visit.  He does not experience angina pectoris, unusual dyspnea, orthopnea, syncope or lightheadedness.  He has not had any recognized episodes of atrial fibrillation.    Past Medical History:    Patient Active Problem List   Diagnosis   ? Dyslipidemia, goal LDL below 70   ? Paroxysmal atrial fibrillation (H)   ? Typical atrial flutter (H)   ? Venous hypertension, chronic, bilateral   ? Polyneuropathy associated with underlying disease (H)   ? Type 2 diabetes mellitus with complication, without long-term current use of insulin (H)   ? Acquired lymphedema of lower extremity   ? Coronary artery disease due to lipid rich plaque   ? PAD (peripheral artery disease) (H)   ? Heart failure with preserved ejection fraction, NYHA class II (H)   ? Essential hypertension   ? Medically noncompliant   ? Chronic venous stasis dermatitis   ? Chronic kidney disease, stage 3       Past Medical History:   Diagnosis Date   ? Atopic keratoconjunctivitis    ? Atrial flutter (H)     ? Bilateral lower leg cellulitis 8/31/2018   ? Candidiasis of perineum 1/3/2018   ? Carotid stenosis, asymptomatic, right    ? Cholecystitis, acute 8/18/2019   ? Coronary artery disease due to lipid rich plaque 2000    CABG x2   ? Diabetic ulcer of both feet (H) 10/31/2017   ? Dyslexia    ? Dyslipidemia, goal LDL below 70 2000   ? Essential hypertension    ? Gangrene of left foot (H)    ? MRSA (methicillin resistant Staphylococcus aureus)    ? Neuropathy    ? Non-STEMI (non-ST elevated myocardial infarction) (H)    ? Other atopic dermatitis    ? Paroxysmal Atrial Fibrillation     Brian Moe: 8/2011 Cardioversion; CHADS2 VASC = 5; he is on warfarin and sotalol    ? Pneumonia 6/26/2018   ? Type 2 diabetes mellitus, without long-term current use of insulin (H)    ? Unable to function independently 11/13/2017       Past Surgical History:   Procedure Laterality Date   ? CARDIOVERSION  08/25/2011   ? CORONARY ARTERY BYPASS GRAFT N/A 03/06/2000    SVG to OM1, SVG to PDA   ? CORONARY ARTERY BYPASS GRAFT N/A 10/04/2018    redo CABG due to graft failure   ? CV CORONARY ANGIOGRAM N/A 10/01/2018    Procedure: Coronary Angiogram;  Surgeon: Miki Mac MD;  Location: [facility];  Service:    ? FOOT AMPUTATION Left 11/05/2017    Procedure: LEFT TRANSMETATARSAL AMPUTATION;  Surgeon: Ever Wick MD;  [facility];  Service:    ? INGUINAL HERNIA REPAIR Bilateral 1969    and 1979   ? LAPAROSCOPIC CHOLECYSTECTOMY N/A 08/18/2019    Procedure: CHOLECYSTECTOMY, LAPAROSCOPIC;  Surgeon: Stewart Fountain MD;  Location: [facility];  Service: General        reports that he quit smoking about 20 years ago. His smoking use included cigarettes. He started smoking about 56 years ago. He has a 36.00 pack-year smoking history. He has never used smokeless tobacco. He reports current alcohol use of about 5.0 standard drinks of alcohol per week. He reports that he does not use drugs.    Family  History   Problem Relation Age of Onset   ? Sudden death Mother 85   ? CABG Father    ? Valvular heart disease Father    ? Esophageal cancer Father 58        cause of death   ? No Medical Problems Son    ? No Medical Problems Sister    ? Obesity Sister    ? Osteoarthritis Sister    ? No Medical Problems Sister    ? No Medical Problems Grandchild    ? No Medical Problems Grandchild        Outpatient Encounter Medications as of 11/12/2020   Medication Sig Dispense Refill   ? acetaminophen (TYLENOL) 325 MG tablet Take 2 tablets (650 mg total) by mouth every 6 (six) hours as needed for pain.  0   ? albuterol (PROVENTIL) 2.5 mg /3 mL (0.083 %) nebulizer solution Take 3 mL (2.5 mg total) by nebulization every 6 (six) hours as needed for wheezing or shortness of breath.  0   ? aluminum-magnesium hydroxide-simethicone (MAALOX ADVANCED) 200-200-20 mg/5 mL Susp Take 30 mL by mouth every 4 (four) hours as needed.     ? aspirin 81 mg chewable tablet Chew 1 tablet (81 mg total) daily.  0   ? bisacodyl (DULCOLAX) 5 mg EC tablet Take 10 mg by mouth daily as needed for constipation.     ? cetirizine (ZYRTEC) 10 MG tablet Take 10 mg by mouth daily.     ? diphenhydrAMINE (BENADRYL) 50 MG capsule Take 50 mg by mouth 3 (three) times a day as needed for itching.     ? emollient (VANICREAM) Crea Apply 1 application topically 3 (three) times a day as needed (dry skin).  0   ? furosemide (LASIX) 20 MG tablet Take 20 mg by mouth daily as needed (at noon, if weight increase 2lbs in 24 hours).            ? furosemide (LASIX) 40 MG tablet Take 40 mg by mouth daily.     ? gentamicin (GARAMYCIN) 0.1 % ointment Apply 1 application topically daily as needed.     ? glipiZIDE (GLUCOTROL) 10 MG tablet Take 10 mg by mouth daily before breakfast.     ? glipiZIDE (GLUCOTROL) 10 MG tablet Take 15 mg by mouth every evening.     ? guaiFENesin-dextromethorphan (MUCINEX DM) 600-30 mg Tb12 Take 1 tablet by mouth 2 (two) times a day as needed.     ?  liraglutide (VICTOZA) 0.6 mg/0.1 mL (18 mg/3 mL) PnIj injection Inject 1.2 mg under the skin daily.     ? miconazole (DESENEX) 2 % powder Apply 1 application topically daily as needed for itching.     ? nitroglycerin (NITROSTAT) 0.4 MG SL tablet Place 0.4 mg under the tongue every 5 (five) minutes as needed for chest pain.      ? omeprazole (PRILOSEC) 20 MG capsule Take 20 mg by mouth daily.      ? oxyCODONE (ROXICODONE) 5 MG immediate release tablet Take 1 tablet (5 mg total) by mouth every 4 (four) hours as needed. 13 tablet 0   ? polyethylene glycol (MIRALAX) 17 gram packet Take 1 packet (17 g total) by mouth daily.  0   ? rivaroxaban (XARELTO) 15 mg tablet Take 15 mg by mouth daily.     ? senna-docusate (PERICOLACE) 8.6-50 mg tablet Take 1 tablet by mouth 2 (two) times a day.  0   ? simvastatin (ZOCOR) 40 MG tablet Take 40 mg by mouth at bedtime.      ? spironolactone (ALDACTONE) 25 MG tablet Take 25 mg by mouth daily.     ? triamcinolone (KENALOG) 0.1 % ointment Apply 1 application topically 2 (two) times a day. Lower legs     ? urea 10 % lotion Apply 1 application topically daily as needed.       No facility-administered encounter medications on file as of 11/12/2020.        Review of Systems:     General: WNL  Eyes: WNL  Ears/Nose/Throat: WNL  Lungs: WNL  Heart: WNL  Stomach: WNL  Bladder: WNL  Muscle/Joints: WNL  Skin: WNL  Nervous System: WNL  Mental Health: WNL     Blood: WNL    Objective:     /70 (Patient Site: Left Arm, Patient Position: Sitting, Cuff Size: Adult Regular)   Pulse 95   Ht 6' (1.829 m)   Wt 200 lb (90.7 kg)   BMI 27.12 kg/m    Wt Readings from Last 5 Encounters:   11/12/20 200 lb (90.7 kg)   08/21/19 203 lb 6.4 oz (92.3 kg)   07/03/19 198 lb 12.8 oz (90.2 kg)   05/02/19 209 lb 4.8 oz (94.9 kg)   04/25/19 209 lb (94.8 kg)        Physical Exam:    GENERAL APPEARANCE: alert, no apparent distress  EYES: no scleral icterus  NECK: no carotid bruits or adenopathy, jugular venous  pressure is difficult to evaluate due to obesity  CHEST: symmetric, the lungs are clear to auscultation  CARDIOVASCULAR: regular rhythm without murmur or gallop  EXTREMITIES: no cyanosis, clubbing; he is wearing compression stockings  SKIN: no xanthelasma  NEUROLOGIC: normal gait and coordination  PSYCHIATRIC: mood and affect are normal    Cardiac Testing:    Echocardiogram:   Results for orders placed during the hospital encounter of 08/16/19   Echo Complete [ECH10] 08/17/2019    Addendum   When compared to the previous study dated 10/4/2018, no significant  change.   Left ventricle ejection fraction is normal. The calculated left  ventricular ejection fraction is 60%. Mild basal inferolateral  hypokinesis.   Normal left ventricular size and systolic function.   Abnormal left ventricular diastolic function.   Left atrial volume is severely increased.   Aortic Valve: The aortic valve is sclerotic without reduced excursion.  Mild aortic regurgitation.        Mily Artis MD 8/17/2019  3:24 PM          Narrative   When compared to the previous study dated 10/4/2018, no significant   change.    Left ventricle ejection fraction is normal. The calculated left   ventricular ejection fraction is 60%.    Normal left ventricular size and systolic function.    Abnormal left ventricular diastolic function.    Left atrial volume is severely increased.    Aortic Valve: The aortic valve is sclerotic without reduced excursion.   Mild aortic regurgitation.          Cardiac Catheterization:   Results for orders placed during the hospital encounter of 09/29/18   Cardiac Catheterization [CATH01] 10/01/2018    Narrative   Estimated blood loss was <20 ml.    Prox Cx to Mid Cx lesion 70% stenosed.    Dist Cx lesion 90% stenosed.    Ost 1st Mrg to 1st Mrg lesion 100% stenosed.    Dist LAD-1 lesion 50% stenosed.    Dist LAD-2 lesion 90% stenosed.    Mid LAD lesion 70% stenosed.    Pressure wire/FFR was performed on the lesion.  pre diagnositic: 0.82.   post diagnostic: 0.65.    Pressure wire/FFR was performed on the lesion.    Prox RCA to Dist RCA lesion 99% stenosed.    Dist Graft lesion 100% stenosed.    Origin to Dist Graft lesion 100% stenosed.          Imaging:    No results found.    Lab Results:    Lab Results   Component Value Date    CREATININE 1.52 (H) 07/28/2020    BUN 31 (H) 07/28/2020     07/28/2020    K 4.6 07/28/2020     07/28/2020    CO2 21 (L) 07/28/2020     Lab Results   Component Value Date    CHOL 116 03/10/2020    TRIG 89 03/10/2020    HDL 36 (L) 03/10/2020    LDLDIRECT 54 07/30/2015     BNP (pg/mL)   Date Value   08/17/2019 181 (H)   11/07/2018 161 (H)   11/05/2018 148 (H)     Creatinine (mg/dL)   Date Value   07/28/2020 1.52 (H)   03/10/2020 1.41 (H)   08/19/2019 1.48 (H)   08/17/2019 1.78 (H)     Magnesium (mg/dL)   Date Value   08/19/2019 1.9   08/16/2019 2.0   10/10/2018 2.1     LDL Calculated (mg/dL)   Date Value   03/10/2020 62   08/25/2017 63   04/26/2016 62     Lab Results   Component Value Date    WBC 9.0 07/28/2020    HGB 15.2 07/28/2020    HCT 45.6 07/28/2020    MCV 86 07/28/2020     07/28/2020           Much or all of the text in this note was generated through the use of the Dragon Dictate voice-to-text software. Errors in spelling or words which seem out of context are unintentional. Sound alike errors, in particular, may have escaped editing.

## 2021-07-01 NOTE — CONSULTS
Consults by Scott Briggs MD at 9/29/2018 12:50 PM     Author: Scott Briggs MD Service: Cardiology Author Type: Physician    Filed: 9/29/2018  1:03 PM Date of Service: 9/29/2018 12:50 PM Status: Signed    : Scott Briggs MD (Physician)     Consult Orders    1. Inpatient consult to Cardiology Reason for Consult? h/o CABG, CP; Did you contact the consulting MD? No; Consult priority: Today (routine); Communication for MD: No phone communication necessary for now [[id number]] ordered by Alex Roche MD at 09/29/18 0616                 Click to link to [facility]     [facility] HEART CARE NOTE    Thank you, Dr. Adams Primary Care Provider, for asking the [facility] team to see Mr. Ever Crane to evaluate chest pain syndrome.      Assessment/Recommendations   Assessment:      Non-STEMI: The patient is ruled in by cardiac enzymes for a non-ST elevation MI.  The patient has chronic atrial flutter which makes ST segment interpretation difficult but there is no clear-cut evidence for ST elevation MI at this time.  The patient has had some intermittent recurrent chest pain but does have room for further medication optimization.  The patient situation is somewhat more complex as he has 18-year-old bypass grafts.  If the patient remains free of chest pain then he would be scheduled for coronary angiography on Monday.  If the patient's condition is unstable then we could proceed to earlier coronary angiography.    Essential hypertension: The patient's blood pressure is reasonable however given the Non-STEMI and symptoms we will push his blood pressure lower.    HFpEF: Heart failure with preserved ejection fraction.  The patient's BNP level is normal.  By physical exam he does not appear to be volume overloaded.    Peripheral vascular disease: Chronic lower extremity vascular insufficiency.  There is no evidence of acute change.    Chronic kidney disease: The patient has an estimated GFR of  49.    Plan:    Continue with IV heparin, hold warfarin    Add oral nitrate    Continue beta-blocker therapy    Check twelve-lead EKG    Echocardiogram to assess LV systolic performance.       History of Present Illness/Subjective    Mr. Ever Crane is a 73 y.o. male with history of coronary artery disease status post coronary artery bypass grafting in 2000.  Details of that surgery are not known to me.  The patient notes that he has had intermittent chest pain over the years however over the past 2-3 days he has developed increased frequency.  He describes his chest pain as being a pressure like sensation across the anterior chest which radiates into his bilateral elbows and occasionally into his back.  He reports no associated symptoms of shortness of breath, diaphoresis, or nausea.  Episodes have largely occurred with activity but he has had some at rest.  He reports no orthopnea, PND or new edema.  He has chronic stasis changes in his lower extremities with rubor but no recent edema.  He denies any foot pain or evident ulceration.    ECG: Twelve-lead EKG dated September 29, 2018 demonstrates atrial flutter with variable block.  No clear-cut acute ST-T wave abnormality.  Personally reviewed.     ECHO: Ordered                                                Other Studies:        Physical Examination Review of Systems   Vitals:    09/29/18 0815   BP: 141/72   Pulse: 65   Resp: 18   Temp: 98.4  F (36.9  C)   SpO2: 95%     Body mass index is 27.14 kg/(m^2).  Wt Readings from Last 3 Encounters:   09/29/18 200 lb 1.6 oz (90.8 kg)   09/01/18 202 lb 1.6 oz (91.7 kg)   08/12/18 193 lb 12.8 oz (87.9 kg)       Intake/Output Summary (Last 24 hours) at 09/29/18 1250  Last data filed at 09/29/18 0900   Gross per 24 hour   Intake              480 ml   Output                0 ml   Net              480 ml       General     Appearance:   The patient is alert oriented to person place and situation.    The patient is in no acute  distress at the time of my evaluation.   HEENT:  Pupils are equal, round, and reactive to light.  Conjunctiva and sclera are clear.  ENT: Oral mucosa is moist and without redness. No evident nasal discharge.   Neck: Without palpable thyroid or appreciable lymph nodes.   Chest: Symmetric, without deformity.   Lungs:   Clear bilaterally with no rales, rhonchi, or wheezes.     Cardiovascular:   Rhythm is irregular. S1 and S2 are normal. No significant murmur is present. JVP is normal. Lower extremities demonstrate rubor of the lower extremities with diminished DP and PT pulses bilaterally.  There is no significant edema.    Abdomen:  Abdomen is flat, soft, and nontender.   Extremities: Without deformity.   Skin: Skin is warm, dry, and otherwise intact except as described on his lower extremities..   Neurologic: Gait is not observed as the patient is at bedrest.             A 12 point comprehensive review of systems was  negative except as noted.      Medical History  Surgical History Family History Social History   Past Medical History:   Diagnosis Date   ? Atrial flutter (H)    ? Candidiasis of perineum 1/3/2018   ? Coronary artery disease due to lipid rich plaque 2000    CABG x2   ? Diabetic ulcer of both feet (H) 10/31/2017   ? Dyslexia    ? Dyslipidemia, goal LDL below 70 2000   ? Essential hypertension    ? Gangrene of left foot (H)    ? Neuropathy (H)    ? Paroxysmal Atrial Fibrillation     Brian Moe: 8/2011 Cardioversion; CHADS2 VASC = 5; he is on warfarin and sotalol    ? Type 2 diabetes mellitus, without long-term current use of insulin (H)    ? Unable to function independently 11/13/2017    Past Surgical History:   Procedure Laterality Date   ? CARDIOVERSION  8/25/2011   ? CORONARY ARTERY BYPASS GRAFT  3/6/2000    SVG to OM1, SVG to PDA   ? FOOT AMPUTATION Left 11/5/2017    Procedure: LEFT TRANSMETATARSAL AMPUTATION;  Surgeon: Ever Wick MD;  Location: [facility] OR;  Service:    ? INGUINAL  "HERNIA REPAIR Bilateral 1969 and 1979    Family History   Problem Relation Age of Onset   ? Sudden death Mother 85   ? CABG Father    ? Valvular heart disease Father    ? Esophageal cancer Father 58     cause of death   ? No Medical Problems Son    ? No Medical Problems Grandchild    ? No Medical Problems Grandchild     Social History     Social History   ? Marital status:      Spouse name: N/A   ? Number of children: 1   ? Years of education: N/A     Occupational History   ?  Self Employed     he states he is still working despite being in [facility] in 2018     Social History Main Topics   ? Smoking status: Former Smoker     Packs/day: 1.00     Years: 36.00     Types: Cigarettes     Start date: 6/13/1964     Quit date: 4/30/2000   ? Smokeless tobacco: Never Used   ? Alcohol use 3.0 oz/week     2 Glasses of wine, 3 Cans of beer per week   ? Drug use: No   ? Sexual activity: Not Currently     Partners: Female     Other Topics Concern   ? Not on file     Social History Narrative    Ever lived with his son Raciel in member, 2017, but then [facility].  Ever states he will move to a facility in [place] in June, 2018. The [facility] papers say he uses the \"blue ride\" but Ever states he has his own vehicle on the campus and is still doing plumbing work in the spring 2018.  Our reception staff at [facility] in [place] confirmed on June 13, 2018 that Ever brought himself to the campus and did not utilize a family member or ride service.  I would also note that he states his granddaughter is name Claire and not Leonela, so I corrected that in the chart (ROSALIA Moe MD).              Medications  Allergies   Scheduled Meds:  ? glipiZIDE  15 mg Oral Daily before supper   ? glipiZIDE  10 mg Oral QAM AC   ? [START ON 9/30/2018] influenza vacc high dose (age 65 or >) (PF) 2018-19  0.5 mL Intramuscular Prior to Discharge   ? insulin aspart (NovoLOG) injection   " Subcutaneous TID with meals   ? insulin aspart (NovoLOG) injection   Subcutaneous TID with meals   ? isosorbide dinitrate  20 mg Oral TID dinitrates   ? loratadine  10 mg Oral DAILY   ? metoprolol succinate  50 mg Oral BID   ? omeprazole  20 mg Oral DAILY   ? simvastatin  40 mg Oral QHS   ? warfarin  5 mg Oral Daily 1700     Continuous Infusions:  ? heparin infusion (100 units/ml) 12 Units/kg/hr (09/29/18 0952)   ? nacl 0.9% 75 mL/hr (09/29/18 1100)     PRN Meds:.acetaminophen, albuterol, calcium (as carbonate), dextrose 50 % (D50W), glucagon (human recombinant), hydrALAZINE, melatonin, nitroglycerin, nitroglycerin, ondansetron Allergies   Allergen Reactions   ? Latex    ? Penicillins Rash     Childhood rxn         Lab Results    Chemistry/lipid CBC Cardiac Enzymes/BNP/TSH/INR   Lab Results   Component Value Date    CHOL 115 08/25/2017    HDL 26 (L) 08/25/2017    LDLCALC 63 08/25/2017    TRIG 130 08/25/2017    CREATININE 1.42 (H) 09/29/2018    BUN 25 09/29/2018    K 4.9 09/29/2018     09/29/2018     09/29/2018    CO2 25 09/29/2018    Lab Results   Component Value Date    WBC 7.6 09/29/2018    HGB 13.4 (L) 09/29/2018    HCT 41.1 09/29/2018    MCV 86 09/29/2018     09/29/2018    Lab Results   Component Value Date    CKMB 1 10/25/2013    TROPONINI 0.47 (HH) 09/29/2018    BNP 40 09/29/2018    TSH 4.46 04/11/2018    INR 1.73 (H) 09/29/2018

## 2021-07-03 NOTE — ADDENDUM NOTE
Addendum Note by Juan Daniel Murphy RN at 1/18/2018  3:47 PM     Author: Juan Daniel Murphy RN Service: -- Author Type: Registered Nurse    Filed: 1/18/2018  3:47 PM Encounter Date: 1/18/2018 Status: Signed    : Juan Daniel Murphy RN (Registered Nurse)    Addended by: JUAN DANIEL MURPHY on: 1/18/2018 03:47 PM        Modules accepted: Orders

## 2021-07-03 NOTE — ADDENDUM NOTE
Addendum Note by Rosa Hernandez, RN at 2/26/2018  1:47 PM     Author: Rosa Hernandez RN Service: -- Author Type: Registered Nurse    Filed: 2/26/2018  1:47 PM Encounter Date: 2/26/2018 Status: Signed    : Rosa Hernandez RN (Registered Nurse)    Addended by: ROSA HERNANDEZ on: 2/26/2018 01:47 PM        Modules accepted: Orders

## 2021-07-03 NOTE — ADDENDUM NOTE
Addendum Note by Gustabo Castorena LPN at 4/18/2019  2:20 PM     Author: Gustabo Castorena LPN Service: -- Author Type: Licensed Nurse    Filed: 4/18/2019  4:23 PM Encounter Date: 4/18/2019 Status: Signed    : Gustabo Castorena LPN (Licensed Nurse)    Addended by: GUSTABO CASTORENA on: 4/18/2019 04:23 PM        Modules accepted: Orders

## 2021-07-03 NOTE — ADDENDUM NOTE
Addendum Note by Gustabo Castorena LPN at 3/15/2019  1:00 PM     Author: Gustabo Castorena LPN Service: -- Author Type: Licensed Nurse    Filed: 3/15/2019  5:02 PM Encounter Date: 3/15/2019 Status: Signed    : Gustabo Castorena LPN (Licensed Nurse)    Addended by: GUSTABO CASTORENA on: 3/15/2019 05:02 PM        Modules accepted: Orders

## 2021-07-03 NOTE — ADDENDUM NOTE
Addendum Note by Aston Cornejo LPN at 1/29/2019 10:40 AM     Author: Aston Cornejo LPN Service: -- Author Type: Licensed Nurse    Filed: 1/29/2019 10:55 AM Encounter Date: 1/29/2019 Status: Signed    : Aston Cornejo LPN (Licensed Nurse)    Addended by: ASTON CORNEJO on: 1/29/2019 10:55 AM        Modules accepted: Orders

## 2021-07-03 NOTE — ADDENDUM NOTE
Addendum Note by Roula Cotto CMA at 2/19/2018 10:35 AM     Author: Roula Cotto CMA Service: -- Author Type: Certified Medical Assistant    Filed: 2/19/2018 10:35 AM Encounter Date: 2/19/2018 Status: Signed    : Roula Cotto CMA (Certified Medical Assistant)    Addended by: ROULA COTTO on: 2/19/2018 10:35 AM        Modules accepted: Orders

## 2021-07-03 NOTE — ADDENDUM NOTE
Addendum Note by Marcy Smiley RN at 2/2/2017  4:30 PM     Author: Marcy Smiley RN Service: -- Author Type: Registered Nurse    Filed: 2/2/2017  4:30 PM Encounter Date: 2/2/2017 Status: Signed    : Marcy Smiley RN (Registered Nurse)    Addended by: MARCY SMILEY on: 2/2/2017 04:30 PM        Modules accepted: Orders

## 2021-07-03 NOTE — ADDENDUM NOTE
Addendum Note by Kaylynn Noyola RN at 5/4/2018  1:33 PM     Author: Kaylynn Noyola RN Service: -- Author Type: Registered Nurse    Filed: 5/4/2018  1:33 PM Encounter Date: 5/4/2018 Status: Signed    : Kaylynn Noyola RN (Registered Nurse)    Addended by: KAYLYNN NOYOLA on: 5/4/2018 01:33 PM        Modules accepted: Orders

## 2021-07-14 PROBLEM — E11.621 DIABETIC ULCER OF BOTH FEET (H): Status: RESOLVED | Noted: 2017-10-31 | Resolved: 2017-12-22

## 2021-07-14 PROBLEM — L97.529 DIABETIC ULCER OF BOTH FEET (H): Status: RESOLVED | Noted: 2017-10-31 | Resolved: 2017-12-22

## 2021-07-14 PROBLEM — L97.519 DIABETIC ULCER OF BOTH FEET (H): Status: RESOLVED | Noted: 2017-10-31 | Resolved: 2017-12-22

## 2021-08-03 PROBLEM — L97.501: Chronic | Status: RESOLVED | Noted: 2018-01-08 | Resolved: 2018-06-13

## 2021-08-03 PROBLEM — Z89.432 S/P TRANSMETATARSAL AMPUTATION OF FOOT, LEFT (H): Chronic | Status: RESOLVED | Noted: 2017-11-27 | Resolved: 2018-06-13

## 2021-08-03 PROBLEM — L97.921 LEG ULCER, LEFT, LIMITED TO BREAKDOWN OF SKIN (H): Chronic | Status: RESOLVED | Noted: 2017-08-10 | Resolved: 2018-01-08

## 2021-10-20 ENCOUNTER — LAB REQUISITION (OUTPATIENT)
Dept: LAB | Facility: CLINIC | Age: 76
End: 2021-10-20
Payer: MEDICARE

## 2021-10-20 DIAGNOSIS — E11.9 TYPE 2 DIABETES MELLITUS WITHOUT COMPLICATIONS (H): ICD-10-CM

## 2021-10-21 LAB
ALBUMIN SERPL-MCNC: 4.1 G/DL (ref 3.5–5)
ALP SERPL-CCNC: 115 U/L (ref 45–120)
ALT SERPL W P-5'-P-CCNC: <9 U/L (ref 0–45)
ANION GAP SERPL CALCULATED.3IONS-SCNC: 12 MMOL/L (ref 5–18)
AST SERPL W P-5'-P-CCNC: 13 U/L (ref 0–40)
BASOPHILS # BLD AUTO: 0.1 10E3/UL (ref 0–0.2)
BASOPHILS NFR BLD AUTO: 1 %
BILIRUB SERPL-MCNC: 1 MG/DL (ref 0–1)
BUN SERPL-MCNC: 16 MG/DL (ref 8–28)
CALCIUM SERPL-MCNC: 9.9 MG/DL (ref 8.5–10.5)
CHLORIDE BLD-SCNC: 105 MMOL/L (ref 98–107)
CO2 SERPL-SCNC: 21 MMOL/L (ref 22–31)
CREAT SERPL-MCNC: 1.29 MG/DL (ref 0.7–1.3)
EOSINOPHIL # BLD AUTO: 0.4 10E3/UL (ref 0–0.7)
EOSINOPHIL NFR BLD AUTO: 5 %
ERYTHROCYTE [DISTWIDTH] IN BLOOD BY AUTOMATED COUNT: 13 % (ref 10–15)
GFR SERPL CREATININE-BSD FRML MDRD: 53 ML/MIN/1.73M2
GLUCOSE BLD-MCNC: 115 MG/DL (ref 70–125)
HBA1C MFR BLD: 6.3 %
HCT VFR BLD AUTO: 44.9 % (ref 40–53)
HGB BLD-MCNC: 14.3 G/DL (ref 13.3–17.7)
IMM GRANULOCYTES # BLD: 0 10E3/UL
IMM GRANULOCYTES NFR BLD: 0 %
LYMPHOCYTES # BLD AUTO: 1.3 10E3/UL (ref 0.8–5.3)
LYMPHOCYTES NFR BLD AUTO: 17 %
MCH RBC QN AUTO: 27.4 PG (ref 26.5–33)
MCHC RBC AUTO-ENTMCNC: 31.8 G/DL (ref 31.5–36.5)
MCV RBC AUTO: 86 FL (ref 78–100)
MONOCYTES # BLD AUTO: 0.6 10E3/UL (ref 0–1.3)
MONOCYTES NFR BLD AUTO: 7 %
NEUTROPHILS # BLD AUTO: 5.3 10E3/UL (ref 1.6–8.3)
NEUTROPHILS NFR BLD AUTO: 70 %
NRBC # BLD AUTO: 0 10E3/UL
NRBC BLD AUTO-RTO: 0 /100
PLATELET # BLD AUTO: 182 10E3/UL (ref 150–450)
POTASSIUM BLD-SCNC: 4.5 MMOL/L (ref 3.5–5)
PROT SERPL-MCNC: 8.2 G/DL (ref 6–8)
RBC # BLD AUTO: 5.21 10E6/UL (ref 4.4–5.9)
SODIUM SERPL-SCNC: 138 MMOL/L (ref 136–145)
WBC # BLD AUTO: 7.7 10E3/UL (ref 4–11)

## 2021-10-21 PROCEDURE — 83036 HEMOGLOBIN GLYCOSYLATED A1C: CPT | Mod: ORL | Performed by: INTERNAL MEDICINE

## 2021-10-21 PROCEDURE — P9603 ONE-WAY ALLOW PRORATED MILES: HCPCS | Mod: ORL | Performed by: INTERNAL MEDICINE

## 2021-10-21 PROCEDURE — 80053 COMPREHEN METABOLIC PANEL: CPT | Mod: ORL | Performed by: INTERNAL MEDICINE

## 2021-10-21 PROCEDURE — 85025 COMPLETE CBC W/AUTO DIFF WBC: CPT | Mod: ORL | Performed by: INTERNAL MEDICINE

## 2021-10-21 PROCEDURE — 36415 COLL VENOUS BLD VENIPUNCTURE: CPT | Mod: ORL | Performed by: INTERNAL MEDICINE

## 2022-01-06 NOTE — PROGRESS NOTES
Progress Notes by Heather Robertson NP at 7/3/2019 11:40 AM     Author: Heather Robertson NP Service: -- Author Type: Nurse Practitioner    Filed: 7/3/2019 12:37 PM Encounter Date: 7/3/2019 Status: Signed    : Heather Robertson NP (Nurse Practitioner)       Follow up Vascular Visit       Date of Service:7/3/2019    Date Last Seen: Visit date not found; 5/16/2019    Chief Complaint: new left leg ulcer; edema    History:   Past Medical History:   Diagnosis Date   ? Atopic keratoconjunctivitis    ? Atrial flutter (H)    ? Bilateral lower leg cellulitis 8/31/2018   ? Candidiasis of perineum 1/3/2018   ? Coronary artery disease due to lipid rich plaque 2000    CABG x2   ? Diabetic ulcer of both feet (H) 10/31/2017   ? Dyslexia    ? Dyslipidemia, goal LDL below 70 2000   ? Essential hypertension    ? Gangrene of left foot (H)    ? MRSA (methicillin resistant Staphylococcus aureus)    ? Neuropathy    ? Non-STEMI (non-ST elevated myocardial infarction) (H)    ? Paroxysmal Atrial Fibrillation     Brian Moe: 8/2011 Cardioversion; CHADS2 VASC = 5; he is on warfarin and sotalol    ? Pneumonia 6/26/2018   ? Type 2 diabetes mellitus, without long-term current use of insulin (H)    ? Unable to function independently 11/13/2017       Pt returns to the [facility] with regards to their left leg wound and leg swelling. Arrives today alone. Pt has been struggling with ulcerations to the left leg; these have been due to surgery, self-inflicted; trauma; and spontaneous. We had him stop doing the wound cares on his leg as he was causing additional wounds due to trauma. Unfortunately he was late to appt and believes his dressing has not been changed for 3 weeks. Last visit we applied silvercel to the wounds and double tubular compression bilaterally. He reports that he is not been consistent with lotioning his legs; does this once every 2 weeks.       Allergies: Latex and  This medication (Digoxin) is filled by Cardiology and will be sent to that office as patient only has one pill left.   Penicillins    Physical Exam:    /88   Pulse 100   Temp 98.7  F (37.1  C)   Resp 18   Ht 6' (1.829 m)   Wt 198 lb 12.8 oz (90.2 kg)   BMI 26.96 kg/m      General:  Patient presents to clinic in no apparent distress.  Head: normocephalic atraumatic  Psychiatric:  Alert and oriented x3.   Respiratory: unlabored breathing; no cough  Integumentary:  Skin is uniformly warm, dry and pink.    Wound #1 Location: left shin  Size: 0.7x0.8x0.1cm depth.  No sinus tract present, Wound base: initially covered with fibrinous material   No undermining present. Periwound: no denudement, erythema, induration, maceration or warmth.        Wound #2 Location: left lateral leg healed    Skin noted to be excessively dry bilaterally      Circumferential volume measures:    Vasc Edema 3/15/2019 4/18/2019 5/29/2019 6/5/2019 7/3/2019   Right just above MTP 23.8 - 24 26.3 23.9   Right Ankle 24.5 - 24.4 21.5 24.2   Right Widest Calf 36.8 - 35.2 32.5 33.7   Right Thigh Up 10cm 47 - - - -   Left - just above MTP 26.5 25 - - -   Left Ankle 21.7 21.5 22.7 - 23.2   Left Widest Calf 31.5 32 33.3 - 32.2   Left Thigh Up 10cm 46.2 45.5 - - -       Ulceration(s)/Wound(s):     VASC Wound 10/22/18 left medial leg vein harvest (below knee) (Active)   Pre Size Length 0.5 4/11/2019  1:00 PM   Pre Size Width 0.4 4/11/2019  1:00 PM   Pre Size Depth 0.1 4/11/2019  1:00 PM   Pre Total Sq cm 0.2 4/11/2019  1:00 PM   Post Size Length 0 4/5/2019 12:00 PM   Post Size Width 0 4/5/2019 12:00 PM   Post Size Depth 0 4/5/2019 12:00 PM   Post Total Sq cm 0.4 3/15/2019 12:00 PM   Undermined no 4/11/2019  1:00 PM   Tunneling no 4/11/2019  1:00 PM   Description scab 5/16/2019 12:00 PM   Prodcut Used Bactroban 10/22/2018  2:00 PM       VASC Wound 05/02/19 left mid shin (Active)   Pre Size Length 0 5/16/2019 12:00 PM   Pre Size Width 0 5/16/2019 12:00 PM   Pre Size Depth 0 5/16/2019 12:00 PM   Pre Total Sq cm 0 5/9/2019  1:00 PM   Description healed 6/12/2019  1:00  PM       VASC Wound 05/02/19 left medial ankle (Active)   Pre Size Length 0 5/16/2019 12:00 PM   Pre Size Width 0 5/16/2019 12:00 PM   Pre Size Depth 0 5/16/2019 12:00 PM   Pre Total Sq cm 0 5/16/2019 12:00 PM   Description healed 6/12/2019  1:00 PM       VASC Wound 05/02/19 left middle medial leg (Active)   Pre Size Length 0 5/9/2019  1:00 PM   Pre Size Width 0 5/9/2019  1:00 PM   Pre Size Depth 0 5/9/2019  1:00 PM   Pre Total Sq cm 0 5/9/2019  1:00 PM   Description healed 6/12/2019  1:00 PM       VASC Wound 05/29/19 Lt lateral shin superior (Active)   Pre Size Length 2 5/29/2019  1:00 PM   Pre Size Width 1.5 5/29/2019  1:00 PM   Pre Size Depth 0.1 5/29/2019  1:00 PM   Pre Total Sq cm 3 5/29/2019  1:00 PM   Description healed 6/12/2019  1:00 PM       VASC Wound 05/29/19 Lt lateral shin distal (Active)   Pre Size Length 1.3 5/29/2019  1:00 PM   Pre Size Width 1.2 5/29/2019  1:00 PM   Pre Size Depth 0.1 5/29/2019  1:00 PM   Pre Total Sq cm 1.56 5/29/2019  1:00 PM   Description healed 6/12/2019  1:00 PM       [facility] Wound 07/03/19 Lt shin (Active)   Pre Size Length 0.7 7/3/2019 11:00 AM   Pre Size Width 0.8 7/3/2019 11:00 AM   Pre Size Depth 0.1 7/3/2019 11:00 AM   Pre Total Sq cm 0.56 7/3/2019 11:00 AM       Wound 08/11/18 Non-pressure related ulcer Leg Right;Anterior (front) (Active)       Wound 01/04/19 Non-pressure related ulcer Leg Left (Active)       Wound 01/04/19 Non-pressure related ulcer Leg Right (Active)       Incision 10/04/18 Leg Left (Active)        Lab Values    Lab Results   Component Value Date    SEDRATE 21 (H) 08/31/2018     Lab Results   Component Value Date    CREATININE 1.36 (H) 01/04/2019     Lab Results   Component Value Date    HGBA1C 7.8 (H) 05/21/2019     Lab Results   Component Value Date    BUN 25 01/04/2019     Lab Results   Component Value Date    ALBUMIN 3.9 01/03/2019     No results found for: [id number]          Impression:  1. Leg ulcer, left, limited to breakdown of skin (H)     2.  Acquired lymphedema of lower extremity     3. Ulcer of left lower extremity with fat layer exposed (H)     4. Non-insulin dependent type 2 diabetes mellitus (H)     5. Venous hypertension, chronic, bilateral     6. Ischemic cardiomyopathy       5/29/19 left lateral shin             Are any of these wounds new today: Yes; Location: left shin    Assessment/Plan:          1. Debridement: After discussion of risk factors and verbal consent was obtained 2% Lidocaine HCL jelly was applied, under clean conditions, the left shin ulceration(s) were debrided using currette. Devitalized and nonviable tissue, along with any fibrin and slough, was removed to improve granulation tissue formation, stimulate wound healing, decrease overall bacteria load, disrupt biofilm formation and decrease edge senescence.  Total excisional debridement was 0.56 sq cm from the epidermis/dermis area and into the subcutaneous tissue with a depth of 0.1 cm.   Ulcers were improved afterwards and .  Measures were unchanged after debridement.             2. Edema: will continue with the double tubular compression bilaterally. The compression wraps were applied today in clinic.           3.  Wound treatment: wound treatment will include irrigation and dressings to promote autolytic debridement which will include:silvercel; ABD; rolled gauze; change weekly; we discussed the importance of skin care; needs to be applying lotion to the right leg once per day; I would like him to leave the left shin alone; let us manage the wound care; he is self inflicting many of the wounds with careless behaviors.           4. Nutrition: diabetic           5. Offloading: na     Patient will follow up with nurse visit in 1 weeks for reevaluation I will see in 3-4 weeks. They were instructed to call the clinic sooner with any signs or symptoms of infection or any further questions/concerns. Answered all questions.    Heather Robertson DNP, RN, CNP, [facility]  [phone]        This note was electronically signed by Heather Robertson

## 2022-05-17 VITALS
TEMPERATURE: 98.1 F | OXYGEN SATURATION: 96 % | HEART RATE: 66 BPM | DIASTOLIC BLOOD PRESSURE: 68 MMHG | RESPIRATION RATE: 18 BRPM | SYSTOLIC BLOOD PRESSURE: 107 MMHG

## 2022-05-17 PROBLEM — N18.30 CHRONIC KIDNEY DISEASE, STAGE 3 (H): Status: ACTIVE | Noted: 2020-11-12

## 2022-05-17 PROBLEM — I50.30 HEART FAILURE WITH PRESERVED EJECTION FRACTION, NYHA CLASS II (H): Status: ACTIVE | Noted: 2018-01-25

## 2022-05-17 PROBLEM — I25.10 CORONARY ARTERY DISEASE DUE TO LIPID RICH PLAQUE: Status: ACTIVE | Noted: 2022-05-17

## 2022-05-17 PROBLEM — I87.303 VENOUS HYPERTENSION, CHRONIC, BILATERAL: Status: ACTIVE | Noted: 2017-08-10

## 2022-05-17 PROBLEM — Z91.199 MEDICALLY NONCOMPLIANT: Status: ACTIVE | Noted: 2018-06-20

## 2022-05-17 PROBLEM — I25.83 CORONARY ARTERY DISEASE DUE TO LIPID RICH PLAQUE: Status: ACTIVE | Noted: 2022-05-17

## 2022-05-17 PROBLEM — I87.2 CHRONIC VENOUS STASIS DERMATITIS: Status: ACTIVE | Noted: 2022-05-17

## 2022-05-17 PROBLEM — I73.9 PAD (PERIPHERAL ARTERY DISEASE) (H): Status: ACTIVE | Noted: 2017-11-14

## 2022-05-17 PROBLEM — I48.0 PAROXYSMAL ATRIAL FIBRILLATION (H): Status: ACTIVE | Noted: 2022-05-17

## 2022-05-17 PROBLEM — G63 POLYNEUROPATHY ASSOCIATED WITH UNDERLYING DISEASE (H): Status: ACTIVE | Noted: 2017-08-10

## 2022-05-17 PROBLEM — E11.8 TYPE 2 DIABETES MELLITUS WITH COMPLICATION, WITHOUT LONG-TERM CURRENT USE OF INSULIN (H): Status: ACTIVE | Noted: 2022-05-17

## 2022-05-17 PROBLEM — I89.0 ACQUIRED LYMPHEDEMA OF LOWER EXTREMITY: Status: ACTIVE | Noted: 2017-10-16

## 2022-05-17 PROBLEM — E78.5 DYSLIPIDEMIA, GOAL LDL BELOW 70: Status: ACTIVE | Noted: 2022-05-17

## 2022-05-17 PROBLEM — I48.3 TYPICAL ATRIAL FLUTTER (H): Status: ACTIVE | Noted: 2022-05-17

## 2022-05-18 ENCOUNTER — TRANSITIONAL CARE UNIT VISIT (OUTPATIENT)
Dept: GERIATRICS | Facility: CLINIC | Age: 77
End: 2022-05-18
Payer: MEDICARE

## 2022-05-18 DIAGNOSIS — N18.30 STAGE 3 CHRONIC KIDNEY DISEASE, UNSPECIFIED WHETHER STAGE 3A OR 3B CKD (H): ICD-10-CM

## 2022-05-18 DIAGNOSIS — I10 ESSENTIAL HYPERTENSION: Chronic | ICD-10-CM

## 2022-05-18 DIAGNOSIS — S72.145D CLOSED NONDISPLACED INTERTROCHANTERIC FRACTURE OF LEFT FEMUR WITH ROUTINE HEALING, SUBSEQUENT ENCOUNTER: Primary | ICD-10-CM

## 2022-05-18 DIAGNOSIS — E11.8 TYPE 2 DIABETES MELLITUS WITH COMPLICATION, WITHOUT LONG-TERM CURRENT USE OF INSULIN (H): ICD-10-CM

## 2022-05-18 PROCEDURE — 99310 SBSQ NF CARE HIGH MDM 45: CPT | Performed by: FAMILY MEDICINE

## 2022-05-18 NOTE — PROGRESS NOTES
[facility] GERIATRIC SERVICES    Facility:   [facility] ([facility]) [[id number]]   Code Status: POLST AVAILABLE      HPI:   Ever is a 77 year old male who was hospitalized May 11, 2022 through May 17, 2022 secondary to evaluation after a fall.  He was walking down the hallway with his walker and tried to go about 20 feet without his walker to the Pap machine at his assisted living facility.  Apparently his right foot caught on his left foot and he fell.  No loss of consciousness.  He is on Xarelto.  His work-up did include a sodium 140, potassium 4.2, BUN 15, creatinine 1.46, white cells 7.7, hemoglobin 12.6 and platelets 164.  The CT of the left hip did show an intertrochanteric fracture.  Chest x-ray was unremarkable.  He did undergo internal fixation.  He also has a history of ischemic cardiomyopathy, CAD status post CABG in 2018 and 2000 along with a history of chronic diastolic heart failure, diabetes, peripheral neuropathy, PAD, hyperlipidemia.  He does have a history of persistent A. fib he does use Xarelto.  He is also on Xarelto for PAD.  He does have asymptomatic severe right internal carotid artery stenosis and back in 2018 the ultrasound did show on the right internal carotid artery 70-99% stenosis.  He is now in the transitional care unit to try to improve his strength balance and his overall conditioning.  His systolic blood pressures ranging 107-135.  He has been afebrile and also on room air.  Blood sugars ranging 187-258 we will watch that now for the next few days and continue with his current insulin regiment.  Regarding his pain management we will go ahead and schedule the Tylenol 650 mg every 6 hours around-the-clock.  He does have oxycodone as needed.  Denies any change in CMS to his left lower extremity.  Does have a history of PVD and his legs are wrapped.  His appetite is good.  He is comfortable using his inhalers and no current exacerbation.    Past Medical History:  Past Medical History:    Diagnosis Date     A-fib (H)      MESHA (acute kidney injury) (H)      Atopic keratoconjunctivitis      Atrial fibrillation (H)     Brian Moe: 8/2011 Cardioversion; CHADS2 VASC = 5; he is on warfarin and sotalol      Atrial flutter (H)      Mcgarry's esophagus 1/1/2012    per note of Dr. Tavo Mike of [facility]     Bilateral lower leg cellulitis 8/31/2018     Candidiasis of perineum 1/3/2018     Carotid stenosis, asymptomatic, right      CHF (congestive heart failure) (H)      Cholecystitis, acute 8/18/2019     Coronary artery disease due to lipid rich plaque 2000    CABG x2     Diabetes (H)      Diabetic ulcer of both feet (H) 10/31/2017     Dyslexia      Dyslipidemia, goal LDL below 70 2000     Epistaxis      Essential hypertension      Gangrene of left foot (H)      GERD (gastroesophageal reflux disease)      HLD (hyperlipidemia)      HTN (hypertension)      Hyponatremia      MRSA (methicillin resistant Staphylococcus aureus)      Neuropathy      Non-STEMI (non-ST elevated myocardial infarction) (H)      Other atopic dermatitis      Peripheral vascular disease (H)      Pneumonia 6/26/2018     Type 2 diabetes mellitus, without long-term current use of insulin (H)      Unable to function independently 11/13/2017           Surgical History:  Past Surgical History:   Procedure Laterality Date     AMPUTATE FOOT Left 11/05/2017    Procedure: LEFT TRANSMETATARSAL AMPUTATION;  Surgeon: Ever Wick MD;  Location: [facility] OR;  Service:      BYPASS GRAFT ARTERY CORONARY N/A 03/06/2000    SVG to OM1, SVG to PDA     BYPASS GRAFT ARTERY CORONARY N/A 10/04/2018    redo CABG due to graft failure     CABG MEASURES GRP       CARDIOVERSION  08/25/2011     CV CORONARY ANGIOGRAM N/A 10/01/2018    Procedure: Coronary Angiogram;  Surgeon: Miki Mac MD;  Location: [facility] Cath Lab;  Service:      INGUINAL HERNIA REPAIR Bilateral 1969    and 1979     IR EXTREMITY ANGIOGRAM BILATERAL  11/3/2017     LAPAROSCOPIC  "CHOLECYSTECTOMY N/A 2019    Procedure: CHOLECYSTECTOMY, LAPAROSCOPIC;  Surgeon: Stewart Fountain MD;  Location: [facility] OR;  Service: General       Family History:   Family History   Problem Relation Age of Onset     Sudden Death Mother 85.00     CABG Father      Valvular heart disease Father      Esophageal Cancer Father 58.00        cause of death     No Known Problems Son      No Known Problems Sister      Obesity Sister      Osteoarthritis Sister      No Known Problems Sister      No Known Problems Grandchild      No Known Problems Grandchild        Social History:    Social History     Socioeconomic History     Marital status:      Number of children: 1   Tobacco Use     Smoking status: Former Smoker     Packs/day: 1.00     Years: 36.00     Pack years: 36.00     Types: Cigarettes, Cigarettes     Start date: 1964     Quit date: 2000     Years since quittin.0     Smokeless tobacco: Never Used   Substance and Sexual Activity     Alcohol use: Yes     Alcohol/week: 5.0 standard drinks     Types: 1 Cans of beer per week     Drug use: No     Sexual activity: Not Currently     Partners: Female   Social History Narrative    Ever lived with his son Raciel in 2017, but then he went to [facility] after foot amputation.  Ever states he will move to a facility in [place] in . 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  Our reception staff at [facility] in [place] confirmed on 2018 that Ever brought himself to the campus and did not utilize a family member or ride service.  I  would also note that he states his granddaughter is name Claire and not Leonela, so I corrected that in the chart (ROSALIA Moe MD). He lives in [facility] in  and is now not driving as he states \"I got a ticket\".           REVIEW OF " SYSTEM:  He currently denies any new symptoms of cough or cold fevers chills nausea vomiting flulike symptoms constipation diarrhea dysuria frequency or urgency.    PHYSICAL EXAM:   Pleasant gentleman in no acute distress.  Head is normocephalic.  Oropharynx pink and moist.  Conjunctiva is pink and clear.  Lung sounds are clear throughout.  Cardiovascular irregularity, history of PVD along with chronic edema along with his legs being wrapped.  Gastrointestinal does have previous abdominal scars.  Nontender nondistended.  Musculoskeletal left hip dressing.  Does need assistance with ADLs.  Psychiatric: Pleasant affect.    Vitals:    05/17/22 2336   BP: 107/68   Pulse: 66   Resp: 18   Temp: 98.1  F (36.7  C)   SpO2: 96%         Medication List:  Current Outpatient Medications   Medication Sig     acetaminophen (TYLENOL) 500 MG tablet Take 1,000 mg by mouth every 6 hours as needed      albuterol (2.5 MG/3ML) 0.083% neb solution Take 2.5 mg by nebulization every 6 hours as needed     furosemide (LASIX) 40 MG tablet Take 40 mg by mouth daily     gentamicin (GARAMYCIN) 0.1 % ointment Apply 1 Application topically daily     glipiZIDE (GLUCOTROL) 10 MG tablet Take 1 tablet (10 mg) by mouth every morning (before breakfast)     glipiZIDE (GLUCOTROL) 5 MG tablet Take 3 tablets (15 mg) by mouth every evening     lisinopril (PRINIVIL/ZESTRIL) 10 MG tablet Take 0.5 tablets (5 mg) by mouth daily     loperamide (IMODIUM) 2 MG capsule Take 2 mg by mouth 3 times daily as needed     metoprolol succinate (TOPROL-XL) 50 MG 24 hr tablet Take 50 mg by mouth 2 times daily     miconazole (MICATIN) 2 % cream Apply topically 2 times daily     miconazole (MICATIN; MICRO GUARD) 2 % powder Apply topically daily     Miconazole-Zinc Oxide-Petrolat (VUSION) 0.25-15-81.35 % OINT Externally apply topically 2 times daily     nitroGLYcerin (NITROSTAT) 0.4 MG sublingual tablet Place 0.4 mg under the tongue every 5 minutes as needed     omeprazole  (PRILOSEC) 20 MG CR capsule Take 20 mg by mouth daily     simvastatin (ZOCOR) 40 MG tablet Take 40 mg by mouth At Bedtime     spironolactone (ALDACTONE) 25 MG tablet Take 25 mg by mouth daily     triamcinolone (KENALOG) 0.1 % cream Apply 1 Application topically daily     urea (CARMOL) 10 % cream Apply 1 Application topically daily as needed     warfarin (COUMADIN) 1 MG tablet Take 1.5 tablets (1.5 mg) by mouth daily     No current facility-administered medications for this visit.        Labs:  CBC RESULTS: Recent Labs   Lab Test 10/21/21  1010   WBC 7.7   RBC 5.21   HGB 14.3   HCT 44.9   MCV 86   MCH 27.4   MCHC 31.8   RDW 13.0        Last Comprehensive Metabolic Panel:  Sodium   Date Value Ref Range Status   10/21/2021 138 136 - 145 mmol/L Final   06/30/2018 139 133 - 144 mmol/L Final     Potassium   Date Value Ref Range Status   10/21/2021 4.5 3.5 - 5.0 mmol/L Final   06/30/2018 4.1 3.4 - 5.3 mmol/L Final     Chloride   Date Value Ref Range Status   10/21/2021 105 98 - 107 mmol/L Final   06/30/2018 108 94 - 109 mmol/L Final     Carbon Dioxide   Date Value Ref Range Status   06/30/2018 20 20 - 32 mmol/L Final     Carbon Dioxide (CO2)   Date Value Ref Range Status   10/21/2021 21 (L) 22 - 31 mmol/L Final     Anion Gap   Date Value Ref Range Status   10/21/2021 12 5 - 18 mmol/L Final   06/30/2018 11 3 - 14 mmol/L Final     Glucose   Date Value Ref Range Status   10/21/2021 115 70 - 125 mg/dL Final   06/30/2018 162 (H) 70 - 99 mg/dL Final     Urea Nitrogen   Date Value Ref Range Status   10/21/2021 16 8 - 28 mg/dL Final   06/30/2018 22 7 - 30 mg/dL Final     Creatinine   Date Value Ref Range Status   10/21/2021 1.29 0.70 - 1.30 mg/dL Final   06/30/2018 1.20 0.66 - 1.25 mg/dL Final     GFR Estimate   Date Value Ref Range Status   10/21/2021 53 (L) >60 mL/min/1.73m2 Final     Comment:     As of July 11, 2021, eGFR is calculated by the CKD-EPI creatinine equation, without race adjustment. eGFR can be influenced by  muscle mass, exercise, and diet. The reported eGFR is an estimation only and is only applicable if the renal function is stable.   03/09/2021 46 (L) >60 mL/min/1.73m2 Final   06/30/2018 59 (L) >60 mL/min/1.7m2 Final     Comment:     Non  GFR Calc     Calcium   Date Value Ref Range Status   10/21/2021 9.9 8.5 - 10.5 mg/dL Final   06/30/2018 9.0 8.5 - 10.1 mg/dL Final     Lab Results   Component Value Date    A1C 6.3 10/21/2021    A1C 6.9 03/09/2021    A1C 7.6 07/28/2020    A1C 7.2 03/10/2020    A1C 7.4 07/25/2019    A1C 7.7 06/26/2018    A1C 7.3 04/20/2018     Recent Labs   Lab Test 03/09/21  0920 03/10/20  1015   CHOL 91 116   HDL 27* 36*   LDL 33 62   TRIG 153* 89         Assessment:   (S72.145D) Closed nondisplaced intertrochanteric fracture of left femur with routine healing, subsequent encounter  (primary encounter diagnosis)  Comment: Status post repair  Plan: We will work with the rehabilitation component    (E11.8) Type 2 diabetes mellitus with complication, without long-term current use of insulin (H)  Comment: Monitor the blood sugars over the next few days and make adjustments as needed  Plan: Continue with current insulin dosing    (I10) Essential hypertension  Comment: Stable  Plan: Continue to monitor    (N18.30) Stage 3 chronic kidney disease, unspecified whether stage 3a or 3b CKD (H)  Comment: Adding in a BMP next week  Plan: Rechecking      PLAN:    Had a pleasant visit today.  Talked about his repair as well as the rehabilitation component along with pain management as well as his history of gait instability and trying to gain his independence.  Also discussed his pain management adding Tylenol 650 mg every 6 hours and continue the oxycodone as needed.  He is on stool softeners for constipation.  No current respiratory issues and is comfortable using his inhalers.  I did asked staff to put Aquaphor in his legs bilaterally as well as wrapped them with Kerlix.  Discussed his nutrition  as well as extra nutrient supplements.  Discussed the importance of sleep as well as the rehabilitation component.  His goal is to go back home to his assisted living facility.  Adding in a CBC as well on May 24.    For documentation purposes total visit 37 minutes which over 50% was spent with the patient going over his hospitalization, chronic medical conditions, pain management, PVD, nutrition, next week laboratory studies, diabetes, hypertension and the rehabilitation component.      Electronically signed by: Wagner Mancuso NP

## 2022-05-18 NOTE — LETTER
5/18/2022        RE: Ever Crane  [address]  [facility] [address]        [facility]    Facility:   [facility] ([facility]) [[id number]]   Code Status: POLST AVAILABLE      HPI:   Ever is a 77 year old male who was hospitalized May 11, 2022 through May 17, 2022 secondary to evaluation after a fall.  He was walking down the hallway with his walker and tried to go about 20 feet without his walker to the Pap machine at his assisted living facility.  Apparently his right foot caught on his left foot and he fell.  No loss of consciousness.  He is on Xarelto.  His work-up did include a sodium 140, potassium 4.2, BUN 15, creatinine 1.46, white cells 7.7, hemoglobin 12.6 and platelets 164.  The CT of the left hip did show an intertrochanteric fracture.  Chest x-ray was unremarkable.  He did undergo internal fixation.  He also has a history of ischemic cardiomyopathy, CAD status post CABG in 2018 and 2000 along with a history of chronic diastolic heart failure, diabetes, peripheral neuropathy, PAD, hyperlipidemia.  He does have a history of persistent A. fib he does use Xarelto.  He is also on Xarelto for PAD.  He does have asymptomatic severe right internal carotid artery stenosis and back in 2018 the ultrasound did show on the right internal carotid artery 70-99% stenosis.  He is now in the transitional care unit to try to improve his strength balance and his overall conditioning.  His systolic blood pressures ranging 107-135.  He has been afebrile and also on room air.  Blood sugars ranging 187-258 we will watch that now for the next few days and continue with his current insulin regiment.  Regarding his pain management we will go ahead and schedule the Tylenol 650 mg every 6 hours around-the-clock.  He does have oxycodone as needed.  Denies any change in CMS to his left lower extremity.  Does have a history of PVD and his legs are wrapped.  His appetite is good.  He is comfortable using his  inhalers and no current exacerbation.    Past Medical History:  Past Medical History:   Diagnosis Date     A-fib (H)      MESHA (acute kidney injury) (H)      Atopic keratoconjunctivitis      Atrial fibrillation (H)     Brian Moe: 8/2011 Cardioversion; CHADS2 VASC = 5; he is on warfarin and sotalol      Atrial flutter (H)      Mcgarry's esophagus 1/1/2012    per note of Dr. Tavo Mike of [facility]     Bilateral lower leg cellulitis 8/31/2018     Candidiasis of perineum 1/3/2018     Carotid stenosis, asymptomatic, right      CHF (congestive heart failure) (H)      Cholecystitis, acute 8/18/2019     Coronary artery disease due to lipid rich plaque 2000    CABG x2     Diabetes (H)      Diabetic ulcer of both feet (H) 10/31/2017     Dyslexia      Dyslipidemia, goal LDL below 70 2000     Epistaxis      Essential hypertension      Gangrene of left foot (H)      GERD (gastroesophageal reflux disease)      HLD (hyperlipidemia)      HTN (hypertension)      Hyponatremia      MRSA (methicillin resistant Staphylococcus aureus)      Neuropathy      Non-STEMI (non-ST elevated myocardial infarction) (H)      Other atopic dermatitis      Peripheral vascular disease (H)      Pneumonia 6/26/2018     Type 2 diabetes mellitus, without long-term current use of insulin (H)      Unable to function independently 11/13/2017           Surgical History:  Past Surgical History:   Procedure Laterality Date     AMPUTATE FOOT Left 11/05/2017    Procedure: LEFT TRANSMETATARSAL AMPUTATION;  Surgeon: Ever Wick MD;  Location: [facility];  Service:      BYPASS GRAFT ARTERY CORONARY N/A 03/06/2000    SVG to OM1, SVG to PDA     BYPASS GRAFT ARTERY CORONARY N/A 10/04/2018    redo CABG due to graft failure     CABG MEASURES GRP       CARDIOVERSION  08/25/2011     CV CORONARY ANGIOGRAM N/A 10/01/2018    Procedure: Coronary Angiogram;  Surgeon: Miki Mac MD;  Location: [facility];  Service:      INGUINAL HERNIA REPAIR Bilateral  "1969    and      IR EXTREMITY ANGIOGRAM BILATERAL  11/3/2017     LAPAROSCOPIC CHOLECYSTECTOMY N/A 2019    Procedure: CHOLECYSTECTOMY, LAPAROSCOPIC;  Surgeon: Stewart Fountain MD;  Location: [facility];  Service: General       Family History:   Family History   Problem Relation Age of Onset     Sudden Death Mother 85.00     CABG Father      Valvular heart disease Father      Esophageal Cancer Father 58.00        cause of death     No Known Problems Son      No Known Problems Sister      Obesity Sister      Osteoarthritis Sister      No Known Problems Sister      No Known Problems Grandchild      No Known Problems Grandchild        Social History:    Social History     Socioeconomic History     Marital status:      Number of children: 1   Tobacco Use     Smoking status: Former Smoker     Packs/day: 1.00     Years: 36.00     Pack years: 36.00     Types: Cigarettes, Cigarettes     Start date: 1964     Quit date: 2000     Years since quittin.0     Smokeless tobacco: Never Used   Substance and Sexual Activity     Alcohol use: Yes     Alcohol/week: 5.0 standard drinks     Types: 1 Cans of beer per week     Drug use: No     Sexual activity: Not Currently     Partners: Female   Social History Narrative    Ever lived with his son Raciel in 2017, but then he went to [facility] after foot amputation.  Ever states he will move to a facility in [place] in . 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  Our reception staff at [facility] in [place] confirmed on 2018 that Ever brought himself to the campus and did not utilize a family member or ride service.  I  would also note that he states his granddaughter is name Claire and not Leonela, so I corrected that in the chart (ROSALIA Moe MD). He lives in [facility] in " "2019 and is now not driving as he states \"I got a ticket\".           REVIEW OF SYSTEM:  He currently denies any new symptoms of cough or cold fevers chills nausea vomiting flulike symptoms constipation diarrhea dysuria frequency or urgency.    PHYSICAL EXAM:   Pleasant gentleman in no acute distress.  Head is normocephalic.  Oropharynx pink and moist.  Conjunctiva is pink and clear.  Lung sounds are clear throughout.  Cardiovascular irregularity, history of PVD along with chronic edema along with his legs being wrapped.  Gastrointestinal does have previous abdominal scars.  Nontender nondistended.  Musculoskeletal left hip dressing.  Does need assistance with ADLs.  Psychiatric: Pleasant affect.    Vitals:    05/17/22 2336   BP: 107/68   Pulse: 66   Resp: 18   Temp: 98.1  F (36.7  C)   SpO2: 96%         Medication List:  Current Outpatient Medications   Medication Sig     acetaminophen (TYLENOL) 500 MG tablet Take 1,000 mg by mouth every 6 hours as needed      albuterol (2.5 MG/3ML) 0.083% neb solution Take 2.5 mg by nebulization every 6 hours as needed     furosemide (LASIX) 40 MG tablet Take 40 mg by mouth daily     gentamicin (GARAMYCIN) 0.1 % ointment Apply 1 Application topically daily     glipiZIDE (GLUCOTROL) 10 MG tablet Take 1 tablet (10 mg) by mouth every morning (before breakfast)     glipiZIDE (GLUCOTROL) 5 MG tablet Take 3 tablets (15 mg) by mouth every evening     lisinopril (PRINIVIL/ZESTRIL) 10 MG tablet Take 0.5 tablets (5 mg) by mouth daily     loperamide (IMODIUM) 2 MG capsule Take 2 mg by mouth 3 times daily as needed     metoprolol succinate (TOPROL-XL) 50 MG 24 hr tablet Take 50 mg by mouth 2 times daily     miconazole (MICATIN) 2 % cream Apply topically 2 times daily     miconazole (MICATIN; MICRO GUARD) 2 % powder Apply topically daily     Miconazole-Zinc Oxide-Petrolat (VUSION) 0.25-15-81.35 % OINT Externally apply topically 2 times daily     nitroGLYcerin (NITROSTAT) 0.4 MG sublingual " tablet Place 0.4 mg under the tongue every 5 minutes as needed     omeprazole (PRILOSEC) 20 MG CR capsule Take 20 mg by mouth daily     simvastatin (ZOCOR) 40 MG tablet Take 40 mg by mouth At Bedtime     spironolactone (ALDACTONE) 25 MG tablet Take 25 mg by mouth daily     triamcinolone (KENALOG) 0.1 % cream Apply 1 Application topically daily     urea (CARMOL) 10 % cream Apply 1 Application topically daily as needed     warfarin (COUMADIN) 1 MG tablet Take 1.5 tablets (1.5 mg) by mouth daily     No current facility-administered medications for this visit.        Labs:  CBC RESULTS: Recent Labs   Lab Test 10/21/21  1010   WBC 7.7   RBC 5.21   HGB 14.3   HCT 44.9   MCV 86   MCH 27.4   MCHC 31.8   RDW 13.0        Last Comprehensive Metabolic Panel:  Sodium   Date Value Ref Range Status   10/21/2021 138 136 - 145 mmol/L Final   06/30/2018 139 133 - 144 mmol/L Final     Potassium   Date Value Ref Range Status   10/21/2021 4.5 3.5 - 5.0 mmol/L Final   06/30/2018 4.1 3.4 - 5.3 mmol/L Final     Chloride   Date Value Ref Range Status   10/21/2021 105 98 - 107 mmol/L Final   06/30/2018 108 94 - 109 mmol/L Final     Carbon Dioxide   Date Value Ref Range Status   06/30/2018 20 20 - 32 mmol/L Final     Carbon Dioxide (CO2)   Date Value Ref Range Status   10/21/2021 21 (L) 22 - 31 mmol/L Final     Anion Gap   Date Value Ref Range Status   10/21/2021 12 5 - 18 mmol/L Final   06/30/2018 11 3 - 14 mmol/L Final     Glucose   Date Value Ref Range Status   10/21/2021 115 70 - 125 mg/dL Final   06/30/2018 162 (H) 70 - 99 mg/dL Final     Urea Nitrogen   Date Value Ref Range Status   10/21/2021 16 8 - 28 mg/dL Final   06/30/2018 22 7 - 30 mg/dL Final     Creatinine   Date Value Ref Range Status   10/21/2021 1.29 0.70 - 1.30 mg/dL Final   06/30/2018 1.20 0.66 - 1.25 mg/dL Final     GFR Estimate   Date Value Ref Range Status   10/21/2021 53 (L) >60 mL/min/1.73m2 Final     Comment:     As of July 11, 2021, eGFR is calculated by the  CKD-EPI creatinine equation, without race adjustment. eGFR can be influenced by muscle mass, exercise, and diet. The reported eGFR is an estimation only and is only applicable if the renal function is stable.   03/09/2021 46 (L) >60 mL/min/1.73m2 Final   06/30/2018 59 (L) >60 mL/min/1.7m2 Final     Comment:     Non  GFR Calc     Calcium   Date Value Ref Range Status   10/21/2021 9.9 8.5 - 10.5 mg/dL Final   06/30/2018 9.0 8.5 - 10.1 mg/dL Final     Lab Results   Component Value Date    A1C 6.3 10/21/2021    A1C 6.9 03/09/2021    A1C 7.6 07/28/2020    A1C 7.2 03/10/2020    A1C 7.4 07/25/2019    A1C 7.7 06/26/2018    A1C 7.3 04/20/2018     Recent Labs   Lab Test 03/09/21  0920 03/10/20  1015   CHOL 91 116   HDL 27* 36*   LDL 33 62   TRIG 153* 89         Assessment:   (S72.145D) Closed nondisplaced intertrochanteric fracture of left femur with routine healing, subsequent encounter  (primary encounter diagnosis)  Comment: Status post repair  Plan: We will work with the rehabilitation component    (E11.8) Type 2 diabetes mellitus with complication, without long-term current use of insulin (H)  Comment: Monitor the blood sugars over the next few days and make adjustments as needed  Plan: Continue with current insulin dosing    (I10) Essential hypertension  Comment: Stable  Plan: Continue to monitor    (N18.30) Stage 3 chronic kidney disease, unspecified whether stage 3a or 3b CKD (H)  Comment: Adding in a BMP next week  Plan: Rechecking      PLAN:    Had a pleasant visit today.  Talked about his repair as well as the rehabilitation component along with pain management as well as his history of gait instability and trying to gain his independence.  Also discussed his pain management adding Tylenol 650 mg every 6 hours and continue the oxycodone as needed.  He is on stool softeners for constipation.  No current respiratory issues and is comfortable using his inhalers.  I did asked staff to put Aquaphor in  his legs bilaterally as well as wrapped them with Kerlix.  Discussed his nutrition as well as extra nutrient supplements.  Discussed the importance of sleep as well as the rehabilitation component.  His goal is to go back home to his assisted living facility.  Adding in a CBC as well on May 24.    For documentation purposes total visit 37 minutes which over 50% was spent with the patient going over his hospitalization, chronic medical conditions, pain management, PVD, nutrition, next week laboratory studies, diabetes, hypertension and the rehabilitation component.      Electronically signed by: Wagner Mancuso NP          Sincerely,        Wagner Mancuso NP

## 2022-05-22 ENCOUNTER — LAB REQUISITION (OUTPATIENT)
Dept: LAB | Facility: CLINIC | Age: 77
End: 2022-05-22
Payer: MEDICARE

## 2022-05-22 DIAGNOSIS — D64.9 ANEMIA, UNSPECIFIED: ICD-10-CM

## 2022-05-22 DIAGNOSIS — I10 ESSENTIAL (PRIMARY) HYPERTENSION: ICD-10-CM

## 2022-05-24 LAB
ANION GAP SERPL CALCULATED.3IONS-SCNC: 9 MMOL/L (ref 5–18)
BASOPHILS # BLD AUTO: 0.1 10E3/UL (ref 0–0.2)
BASOPHILS NFR BLD AUTO: 1 %
BUN SERPL-MCNC: 21 MG/DL (ref 8–28)
CALCIUM SERPL-MCNC: 9.3 MG/DL (ref 8.5–10.5)
CHLORIDE BLD-SCNC: 111 MMOL/L (ref 98–107)
CO2 SERPL-SCNC: 20 MMOL/L (ref 22–31)
CREAT SERPL-MCNC: 1.22 MG/DL (ref 0.7–1.3)
EOSINOPHIL # BLD AUTO: 0.4 10E3/UL (ref 0–0.7)
EOSINOPHIL NFR BLD AUTO: 4 %
ERYTHROCYTE [DISTWIDTH] IN BLOOD BY AUTOMATED COUNT: 13.3 % (ref 10–15)
GFR SERPL CREATININE-BSD FRML MDRD: 61 ML/MIN/1.73M2
GLUCOSE BLD-MCNC: 173 MG/DL (ref 70–125)
HCT VFR BLD AUTO: 36.4 % (ref 40–53)
HGB BLD-MCNC: 11.5 G/DL (ref 13.3–17.7)
IMM GRANULOCYTES # BLD: 0.1 10E3/UL
IMM GRANULOCYTES NFR BLD: 1 %
LYMPHOCYTES # BLD AUTO: 1.4 10E3/UL (ref 0.8–5.3)
LYMPHOCYTES NFR BLD AUTO: 16 %
MCH RBC QN AUTO: 28.8 PG (ref 26.5–33)
MCHC RBC AUTO-ENTMCNC: 31.6 G/DL (ref 31.5–36.5)
MCV RBC AUTO: 91 FL (ref 78–100)
MONOCYTES # BLD AUTO: 0.5 10E3/UL (ref 0–1.3)
MONOCYTES NFR BLD AUTO: 5 %
NEUTROPHILS # BLD AUTO: 6.5 10E3/UL (ref 1.6–8.3)
NEUTROPHILS NFR BLD AUTO: 73 %
NRBC # BLD AUTO: 0 10E3/UL
NRBC BLD AUTO-RTO: 0 /100
PLATELET # BLD AUTO: 234 10E3/UL (ref 150–450)
POTASSIUM BLD-SCNC: 4.3 MMOL/L (ref 3.5–5)
RBC # BLD AUTO: 3.99 10E6/UL (ref 4.4–5.9)
SODIUM SERPL-SCNC: 140 MMOL/L (ref 136–145)
WBC # BLD AUTO: 8.9 10E3/UL (ref 4–11)

## 2022-05-24 PROCEDURE — 85025 COMPLETE CBC W/AUTO DIFF WBC: CPT

## 2022-05-24 PROCEDURE — 82310 ASSAY OF CALCIUM: CPT

## 2022-05-24 PROCEDURE — 36415 COLL VENOUS BLD VENIPUNCTURE: CPT

## 2022-05-24 PROCEDURE — P9604 ONE-WAY ALLOW PRORATED TRIP: HCPCS

## 2022-05-25 ENCOUNTER — TRANSITIONAL CARE UNIT VISIT (OUTPATIENT)
Dept: GERIATRICS | Facility: CLINIC | Age: 77
End: 2022-05-25
Payer: MEDICARE

## 2022-05-25 VITALS
OXYGEN SATURATION: 98 % | DIASTOLIC BLOOD PRESSURE: 50 MMHG | SYSTOLIC BLOOD PRESSURE: 114 MMHG | TEMPERATURE: 97.8 F | RESPIRATION RATE: 18 BRPM | BODY MASS INDEX: 26.26 KG/M2 | HEIGHT: 72 IN | WEIGHT: 193.9 LBS | HEART RATE: 56 BPM

## 2022-05-25 DIAGNOSIS — I89.0 ACQUIRED LYMPHEDEMA OF LOWER EXTREMITY: ICD-10-CM

## 2022-05-25 DIAGNOSIS — I10 ESSENTIAL HYPERTENSION: Chronic | ICD-10-CM

## 2022-05-25 DIAGNOSIS — S72.145D CLOSED NONDISPLACED INTERTROCHANTERIC FRACTURE OF LEFT FEMUR WITH ROUTINE HEALING, SUBSEQUENT ENCOUNTER: Primary | ICD-10-CM

## 2022-05-25 DIAGNOSIS — E11.59 TYPE 2 DIABETES MELLITUS WITH OTHER CIRCULATORY COMPLICATION, WITH LONG-TERM CURRENT USE OF INSULIN (H): ICD-10-CM

## 2022-05-25 DIAGNOSIS — Z79.4 TYPE 2 DIABETES MELLITUS WITH OTHER CIRCULATORY COMPLICATION, WITH LONG-TERM CURRENT USE OF INSULIN (H): ICD-10-CM

## 2022-05-25 PROBLEM — I25.5 ISCHEMIC CARDIOMYOPATHY: Status: ACTIVE | Noted: 2022-05-12

## 2022-05-25 PROBLEM — I65.21 CAROTID STENOSIS, ASYMPTOMATIC, RIGHT: Status: ACTIVE | Noted: 2022-05-12

## 2022-05-25 PROBLEM — E11.8 TYPE 2 DIABETES MELLITUS WITH COMPLICATION, WITHOUT LONG-TERM CURRENT USE OF INSULIN (H): Status: RESOLVED | Noted: 2022-05-17 | Resolved: 2022-05-25

## 2022-05-25 PROBLEM — I48.0 PAROXYSMAL ATRIAL FIBRILLATION (H): Status: ACTIVE | Noted: 2022-05-12

## 2022-05-25 PROBLEM — I87.2 CHRONIC VENOUS STASIS DERMATITIS: Status: ACTIVE | Noted: 2022-05-12

## 2022-05-25 PROCEDURE — 99310 SBSQ NF CARE HIGH MDM 45: CPT | Performed by: FAMILY MEDICINE

## 2022-05-25 NOTE — PROGRESS NOTES
[facility] GERIATRIC SERVICES    Facility:   [facility] ([facility]) [[id number]]   Code Status: FULL CODE      CHIEF COMPLAINT/REASON FOR VISIT:  Chief Complaint   Patient presents with     RECHECK       HISTORY:      HPI: Ever is a 77 year old male I had the opportunity to revisit with once again today not only secondary to his hospitalization May 11 through May 17, 2022 secondary evaluation after a fall.  He was ambulating down the hallway with his walker and then did not use his walker to get to the pop machine at his assisted living facility and did have a fall who was found to have a left intertrochanteric fracture status post repair as well as today's discussion of the rehabilitation component, diabetes, blood pressures, laboratory studies from yesterday.  His systolic blood pressures ranging 107-135.  His weight 193 pounds in comparison to May 20 196 pounds.  Blood sugars in the morning ranging 141 with the highest of 257 most of them less than 180 in the p.m. the lowest was at 78 otherwise most of them running 133-269 and he is on Lantus Victoza and Glucotrol.  No current changes.  He states his appetite to be good.  Denies any bowel or bladder problems.  No respiratory concerns and no albuterol as needed.  For his pain he only took 1 oxycodone last on May 22 otherwise and scheduled Tylenol.  I did have a chance to talk with his Sister Miriam today.  The legs look much better.  He is not sure how far he is actually ambulating.  He did have a visit with orthopedics yesterday and the sutures came out and the x-rays were performed.    Past Medical History:   Diagnosis Date     A-fib (H)      MESHA (acute kidney injury) (H)      Atopic keratoconjunctivitis      Atrial fibrillation (H)     Brian Moe: 8/2011 Cardioversion; CHADS2 VASC = 5; he is on warfarin and sotalol      Atrial flutter (H)      Mcgarry's esophagus 1/1/2012    per note of Dr. Tavo Mike of [facility]     Bilateral lower leg cellulitis 8/31/2018      Candidiasis of perineum 1/3/2018     Carotid stenosis, asymptomatic, right      CHF (congestive heart failure) (H)      Cholecystitis, acute 2019     Coronary artery disease due to lipid rich plaque 2000    CABG x2     Diabetes (H)      Diabetic ulcer of both feet (H) 10/31/2017     Dyslexia      Dyslipidemia, goal LDL below 70 2000     Epistaxis      Essential hypertension      Gangrene of left foot (H)      GERD (gastroesophageal reflux disease)      HLD (hyperlipidemia)      HTN (hypertension)      Hyponatremia      MRSA (methicillin resistant Staphylococcus aureus)      Neuropathy      Non-STEMI (non-ST elevated myocardial infarction) (H)      Other atopic dermatitis      Peripheral vascular disease (H)      Pneumonia 2018     Type 2 diabetes mellitus, without long-term current use of insulin (H)      Unable to function independently 2017            Family History   Problem Relation Age of Onset     Sudden Death Mother 85.00     CABG Father      Valvular heart disease Father      Esophageal Cancer Father 58.00        cause of death     No Known Problems Son      No Known Problems Sister      Obesity Sister      Osteoarthritis Sister      No Known Problems Sister      No Known Problems Grandchild      No Known Problems Grandchild       Social History     Socioeconomic History     Marital status:      Number of children: 1   Tobacco Use     Smoking status: Former Smoker     Packs/day: 1.00     Years: 36.00     Pack years: 36.00     Types: Cigarettes, Cigarettes     Start date: 1964     Quit date: 2000     Years since quittin.0     Smokeless tobacco: Never Used   Substance and Sexual Activity     Alcohol use: Yes     Alcohol/week: 5.0 standard drinks     Types: 1 Cans of beer per week     Drug use: No     Sexual activity: Not Currently     Partners: Female   Social History Narrative    Ever lived with his son Raciel in 2017, but then he went to [facility] after foot  "amputation.  Ever states he will move to a facility in [place] in June, 2018. 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  Our reception staff at [facility] in [place] confirmed on June 13, 2018 that Ever brought himself to the campus and did not utilize a family member or ride service.  I  would also note that he states his granddaughter is name Claire and not Leonela, so I corrected that in the chart (ROSALIA Moe MD). He lives in [facility] in 2019 and is now not driving as he states \"I got a ticket\".           REVIEW OF SYSTEM:    He currently denies any new symptoms of cough or cold fevers chills nausea vomiting flulike symptoms constipation diarrhea dysuria frequency or urgency.  PHYSICAL EXAM:   Pleasant gentleman in no acute distress.  Head is normocephalic.  Oropharynx pink and moist.  Conjunctiva is pink and clear.  Lung sounds are clear throughout.  Cardiovascular irregularity, history of PVD along with chronic edema along with his legs being wrapped.  Gastrointestinal does have previous abdominal scars.  Nontender nondistended.  Musculoskeletal good CMS to his left leg.  Sutures are out..  Does need assistance with ADLs.  Psychiatric: Pleasant affect.       Current Outpatient Medications:      acetaminophen (TYLENOL) 500 MG tablet, Take 1,000 mg by mouth every 6 hours as needed , Disp: , Rfl:      albuterol (2.5 MG/3ML) 0.083% neb solution, Take 2.5 mg by nebulization every 6 hours as needed, Disp: , Rfl:      furosemide (LASIX) 40 MG tablet, Take 40 mg by mouth daily, Disp: , Rfl:      gentamicin (GARAMYCIN) 0.1 % ointment, Apply 1 Application topically daily, Disp: , Rfl:      glipiZIDE (GLUCOTROL) 10 MG tablet, Take 1 tablet (10 mg) by mouth every morning (before breakfast), Disp: 30 tablet, Rfl: 0     glipiZIDE (GLUCOTROL) 5 MG tablet, Take 3 tablets (15 " mg) by mouth every evening, Disp: 30 tablet, Rfl: 0     lisinopril (PRINIVIL/ZESTRIL) 10 MG tablet, Take 0.5 tablets (5 mg) by mouth daily, Disp: 30 tablet, Rfl: 0     loperamide (IMODIUM) 2 MG capsule, Take 2 mg by mouth 3 times daily as needed, Disp: , Rfl:      metoprolol succinate (TOPROL-XL) 50 MG 24 hr tablet, Take 50 mg by mouth 2 times daily, Disp: , Rfl:      miconazole (MICATIN) 2 % cream, Apply topically 2 times daily, Disp: 35 g, Rfl: 0     miconazole (MICATIN; MICRO GUARD) 2 % powder, Apply topically daily, Disp: 43 g, Rfl: 0     Miconazole-Zinc Oxide-Petrolat (VUSION) 0.25-15-81.35 % OINT, Externally apply topically 2 times daily, Disp: , Rfl:      nitroGLYcerin (NITROSTAT) 0.4 MG sublingual tablet, Place 0.4 mg under the tongue every 5 minutes as needed, Disp: , Rfl:      omeprazole (PRILOSEC) 20 MG CR capsule, Take 20 mg by mouth daily, Disp: , Rfl:      simvastatin (ZOCOR) 40 MG tablet, Take 40 mg by mouth At Bedtime, Disp: , Rfl:      spironolactone (ALDACTONE) 25 MG tablet, Take 25 mg by mouth daily, Disp: , Rfl:      triamcinolone (KENALOG) 0.1 % cream, Apply 1 Application topically daily, Disp: , Rfl:      urea (CARMOL) 10 % cream, Apply 1 Application topically daily as needed, Disp: , Rfl:      warfarin (COUMADIN) 1 MG tablet, Take 1.5 tablets (1.5 mg) by mouth daily, Disp: 30 tablet, Rfl: 0    /50   Pulse 56   Temp 97.8  F (36.6  C)   Resp 18   Ht 1.829 m (6')   Wt 88 kg (193 lb 14.4 oz)   SpO2 98%   BMI 26.30 kg/m      LABS:   Last Comprehensive Metabolic Panel:  Sodium   Date Value Ref Range Status   05/24/2022 140 136 - 145 mmol/L Final   06/30/2018 139 133 - 144 mmol/L Final     Potassium   Date Value Ref Range Status   05/24/2022 4.3 3.5 - 5.0 mmol/L Final   06/30/2018 4.1 3.4 - 5.3 mmol/L Final     Chloride   Date Value Ref Range Status   05/24/2022 111 (H) 98 - 107 mmol/L Final   06/30/2018 108 94 - 109 mmol/L Final     Carbon Dioxide   Date Value Ref Range Status    06/30/2018 20 20 - 32 mmol/L Final     Carbon Dioxide (CO2)   Date Value Ref Range Status   05/24/2022 20 (L) 22 - 31 mmol/L Final     Anion Gap   Date Value Ref Range Status   05/24/2022 9 5 - 18 mmol/L Final   06/30/2018 11 3 - 14 mmol/L Final     Glucose   Date Value Ref Range Status   05/24/2022 173 (H) 70 - 125 mg/dL Final   06/30/2018 162 (H) 70 - 99 mg/dL Final     Urea Nitrogen   Date Value Ref Range Status   05/24/2022 21 8 - 28 mg/dL Final   06/30/2018 22 7 - 30 mg/dL Final     Creatinine   Date Value Ref Range Status   05/24/2022 1.22 0.70 - 1.30 mg/dL Final   06/30/2018 1.20 0.66 - 1.25 mg/dL Final     GFR Estimate   Date Value Ref Range Status   05/24/2022 61 >60 mL/min/1.73m2 Final     Comment:     Effective December 21, 2021 eGFRcr in adults is calculated using the 2021 CKD-EPI creatinine equation which includes age and gender (Denise et al., NEJ, DOI: 10.1056/UATIle4890716)   03/09/2021 46 (L) >60 mL/min/1.73m2 Final   06/30/2018 59 (L) >60 mL/min/1.7m2 Final     Comment:     Non  GFR Calc     Calcium   Date Value Ref Range Status   05/24/2022 9.3 8.5 - 10.5 mg/dL Final   06/30/2018 9.0 8.5 - 10.1 mg/dL Final     Bilirubin Total   Date Value Ref Range Status   10/21/2021 1.0 0.0 - 1.0 mg/dL Final   06/29/2018 1.0 0.2 - 1.3 mg/dL Final     Alkaline Phosphatase   Date Value Ref Range Status   10/21/2021 115 45 - 120 U/L Final   06/29/2018 73 40 - 150 U/L Final     ALT   Date Value Ref Range Status   10/21/2021 <9 0 - 45 U/L Final   06/29/2018 25 0 - 70 U/L Final     AST   Date Value Ref Range Status   10/21/2021 13 0 - 40 U/L Final   06/29/2018 24 0 - 45 U/L Final             Lab Results   Component Value Date    A1C 6.3 10/21/2021    A1C 6.9 03/09/2021    A1C 7.6 07/28/2020    A1C 7.2 03/10/2020    A1C 7.4 07/25/2019    A1C 7.7 06/26/2018    A1C 7.3 04/20/2018     Last Comprehensive Metabolic Panel:  Sodium   Date Value Ref Range Status   05/24/2022 140 136 - 145 mmol/L Final    06/30/2018 139 133 - 144 mmol/L Final     Potassium   Date Value Ref Range Status   05/24/2022 4.3 3.5 - 5.0 mmol/L Final   06/30/2018 4.1 3.4 - 5.3 mmol/L Final     Chloride   Date Value Ref Range Status   05/24/2022 111 (H) 98 - 107 mmol/L Final   06/30/2018 108 94 - 109 mmol/L Final     Carbon Dioxide   Date Value Ref Range Status   06/30/2018 20 20 - 32 mmol/L Final     Carbon Dioxide (CO2)   Date Value Ref Range Status   05/24/2022 20 (L) 22 - 31 mmol/L Final     Anion Gap   Date Value Ref Range Status   05/24/2022 9 5 - 18 mmol/L Final   06/30/2018 11 3 - 14 mmol/L Final     Glucose   Date Value Ref Range Status   05/24/2022 173 (H) 70 - 125 mg/dL Final   06/30/2018 162 (H) 70 - 99 mg/dL Final     Urea Nitrogen   Date Value Ref Range Status   05/24/2022 21 8 - 28 mg/dL Final   06/30/2018 22 7 - 30 mg/dL Final     Creatinine   Date Value Ref Range Status   05/24/2022 1.22 0.70 - 1.30 mg/dL Final   06/30/2018 1.20 0.66 - 1.25 mg/dL Final     GFR Estimate   Date Value Ref Range Status   05/24/2022 61 >60 mL/min/1.73m2 Final     Comment:     Effective December 21, 2021 eGFRcr in adults is calculated using the 2021 CKD-EPI creatinine equation which includes age and gender (Denise et al., NEJ, DOI: 10.1056/DPKKmb9307864)   03/09/2021 46 (L) >60 mL/min/1.73m2 Final   06/30/2018 59 (L) >60 mL/min/1.7m2 Final     Comment:     Non  GFR Calc     Calcium   Date Value Ref Range Status   05/24/2022 9.3 8.5 - 10.5 mg/dL Final   06/30/2018 9.0 8.5 - 10.1 mg/dL Final           ASSESSMENT:    Encounter Diagnoses   Name Primary?     Closed nondisplaced intertrochanteric fracture of left femur with routine healing, subsequent encounter Yes     Acquired lymphedema of lower extremity      Essential hypertension      Type 2 diabetes mellitus with other circulatory complication, with long-term current use of insulin (H)        PLAN:    For pain he can still take a scheduled Tylenol.  Not requiring any oxycodone as  needed.  Went over his laboratory studies from May 24.  Went over his blood pressures as well as diabetes and his nutrition.  His appetite is pretty good.  Did have a visit with orthopedics on the 24th his next follow-up visit is Isabel 3.  Also a chance to talk to his Sister Miriam who also gave me some insight into his overall care but also trying to encourage the rehabilitation component.    For documentation purposes total visit 40 minutes to which over 50% was spent with the patient going over his care, left leg, diabetes, hypertension, pain management and the remainder the time was spent talking with his Sister Miriam about all the aforementioned including his laboratory studies and his rehabilitation component.        Electronically signed by: Wagner Mancuso NP

## 2022-05-25 NOTE — LETTER
5/25/2022        RE: Ever Crane  [address]  [facility] [address]        M [facility] GERIATRIC SERVICES    Facility:   [facility] ([facility]) [[id number]]   Code Status: FULL CODE      CHIEF COMPLAINT/REASON FOR VISIT:  Chief Complaint   Patient presents with     RECHECK       HISTORY:      HPI: Ever is a 77 year old male I had the opportunity to revisit with once again today not only secondary to his hospitalization May 11 through May 17, 2022 secondary evaluation after a fall.  He was ambulating down the hallway with his walker and then did not use his walker to get to the pop machine at his assisted living facility and did have a fall who was found to have a left intertrochanteric fracture status post repair as well as today's discussion of the rehabilitation component, diabetes, blood pressures, laboratory studies from yesterday.  His systolic blood pressures ranging 107-135.  His weight 193 pounds in comparison to May 20 196 pounds.  Blood sugars in the morning ranging 141 with the highest of 257 most of them less than 180 in the p.m. the lowest was at 78 otherwise most of them running 133-269 and he is on Lantus Victoza and Glucotrol.  No current changes.  He states his appetite to be good.  Denies any bowel or bladder problems.  No respiratory concerns and no albuterol as needed.  For his pain he only took 1 oxycodone last on May 22 otherwise and scheduled Tylenol.  I did have a chance to talk with his Sister Miriam today.  The legs look much better.  He is not sure how far he is actually ambulating.  He did have a visit with orthopedics yesterday and the sutures came out and the x-rays were performed.    Past Medical History:   Diagnosis Date     A-fib (H)      MESHA (acute kidney injury) (H)      Atopic keratoconjunctivitis      Atrial fibrillation (H)     Brian Moe: 8/2011 Cardioversion; CHADS2 VASC = 5; he is on warfarin and sotalol      Atrial flutter (H)      Mcgarry's esophagus 1/1/2012     per note of Dr. Tavo Mike of [facility]     Bilateral lower leg cellulitis 2018     Candidiasis of perineum 1/3/2018     Carotid stenosis, asymptomatic, right      CHF (congestive heart failure) (H)      Cholecystitis, acute 2019     Coronary artery disease due to lipid rich plaque 2000    CABG x2     Diabetes (H)      Diabetic ulcer of both feet (H) 10/31/2017     Dyslexia      Dyslipidemia, goal LDL below 70 2000     Epistaxis      Essential hypertension      Gangrene of left foot (H)      GERD (gastroesophageal reflux disease)      HLD (hyperlipidemia)      HTN (hypertension)      Hyponatremia      MRSA (methicillin resistant Staphylococcus aureus)      Neuropathy      Non-STEMI (non-ST elevated myocardial infarction) (H)      Other atopic dermatitis      Peripheral vascular disease (H)      Pneumonia 2018     Type 2 diabetes mellitus, without long-term current use of insulin (H)      Unable to function independently 2017            Family History   Problem Relation Age of Onset     Sudden Death Mother 85.00     CABG Father      Valvular heart disease Father      Esophageal Cancer Father 58.00        cause of death     No Known Problems Son      No Known Problems Sister      Obesity Sister      Osteoarthritis Sister      No Known Problems Sister      No Known Problems Grandchild      No Known Problems Grandchild       Social History     Socioeconomic History     Marital status:      Number of children: 1   Tobacco Use     Smoking status: Former Smoker     Packs/day: 1.00     Years: 36.00     Pack years: 36.00     Types: Cigarettes, Cigarettes     Start date: 1964     Quit date: 2000     Years since quittin.0     Smokeless tobacco: Never Used   Substance and Sexual Activity     Alcohol use: Yes     Alcohol/week: 5.0 standard drinks     Types: 1 Cans of beer per week     Drug use: No     Sexual activity: Not Currently     Partners: Female   Social History Narrative    Ever  "lived with his son Raciel in [place], 2017, but then he went to [facility] after foot amputation.  Ever states he will move to a facility in [place] in June, 2018. 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  Our reception staff at [facility] in [place] confirmed on June 13, 2018 that Ever brought himself to the campus and did not utilize a family member or ride service.  I  would also note that he states his granddaughter is name Claire and not Leonela, so I corrected that in the chart (ROSALIA Moe MD). He lives in [facility] in 2019 and is now not driving as he states \"I got a ticket\".           REVIEW OF SYSTEM:    He currently denies any new symptoms of cough or cold fevers chills nausea vomiting flulike symptoms constipation diarrhea dysuria frequency or urgency.  PHYSICAL EXAM:   Pleasant gentleman in no acute distress.  Head is normocephalic.  Oropharynx pink and moist.  Conjunctiva is pink and clear.  Lung sounds are clear throughout.  Cardiovascular irregularity, history of PVD along with chronic edema along with his legs being wrapped.  Gastrointestinal does have previous abdominal scars.  Nontender nondistended.  Musculoskeletal good CMS to his left leg.  Sutures are out..  Does need assistance with ADLs.  Psychiatric: Pleasant affect.       Current Outpatient Medications:      acetaminophen (TYLENOL) 500 MG tablet, Take 1,000 mg by mouth every 6 hours as needed , Disp: , Rfl:      albuterol (2.5 MG/3ML) 0.083% neb solution, Take 2.5 mg by nebulization every 6 hours as needed, Disp: , Rfl:      furosemide (LASIX) 40 MG tablet, Take 40 mg by mouth daily, Disp: , Rfl:      gentamicin (GARAMYCIN) 0.1 % ointment, Apply 1 Application topically daily, Disp: , Rfl:      glipiZIDE (GLUCOTROL) 10 MG tablet, Take 1 tablet (10 mg) by mouth every morning (before " breakfast), Disp: 30 tablet, Rfl: 0     glipiZIDE (GLUCOTROL) 5 MG tablet, Take 3 tablets (15 mg) by mouth every evening, Disp: 30 tablet, Rfl: 0     lisinopril (PRINIVIL/ZESTRIL) 10 MG tablet, Take 0.5 tablets (5 mg) by mouth daily, Disp: 30 tablet, Rfl: 0     loperamide (IMODIUM) 2 MG capsule, Take 2 mg by mouth 3 times daily as needed, Disp: , Rfl:      metoprolol succinate (TOPROL-XL) 50 MG 24 hr tablet, Take 50 mg by mouth 2 times daily, Disp: , Rfl:      miconazole (MICATIN) 2 % cream, Apply topically 2 times daily, Disp: 35 g, Rfl: 0     miconazole (MICATIN; MICRO GUARD) 2 % powder, Apply topically daily, Disp: 43 g, Rfl: 0     Miconazole-Zinc Oxide-Petrolat (VUSION) 0.25-15-81.35 % OINT, Externally apply topically 2 times daily, Disp: , Rfl:      nitroGLYcerin (NITROSTAT) 0.4 MG sublingual tablet, Place 0.4 mg under the tongue every 5 minutes as needed, Disp: , Rfl:      omeprazole (PRILOSEC) 20 MG CR capsule, Take 20 mg by mouth daily, Disp: , Rfl:      simvastatin (ZOCOR) 40 MG tablet, Take 40 mg by mouth At Bedtime, Disp: , Rfl:      spironolactone (ALDACTONE) 25 MG tablet, Take 25 mg by mouth daily, Disp: , Rfl:      triamcinolone (KENALOG) 0.1 % cream, Apply 1 Application topically daily, Disp: , Rfl:      urea (CARMOL) 10 % cream, Apply 1 Application topically daily as needed, Disp: , Rfl:      warfarin (COUMADIN) 1 MG tablet, Take 1.5 tablets (1.5 mg) by mouth daily, Disp: 30 tablet, Rfl: 0    /50   Pulse 56   Temp 97.8  F (36.6  C)   Resp 18   Ht 1.829 m (6')   Wt 88 kg (193 lb 14.4 oz)   SpO2 98%   BMI 26.30 kg/m      LABS:   Last Comprehensive Metabolic Panel:  Sodium   Date Value Ref Range Status   05/24/2022 140 136 - 145 mmol/L Final   06/30/2018 139 133 - 144 mmol/L Final     Potassium   Date Value Ref Range Status   05/24/2022 4.3 3.5 - 5.0 mmol/L Final   06/30/2018 4.1 3.4 - 5.3 mmol/L Final     Chloride   Date Value Ref Range Status   05/24/2022 111 (H) 98 - 107 mmol/L Final    06/30/2018 108 94 - 109 mmol/L Final     Carbon Dioxide   Date Value Ref Range Status   06/30/2018 20 20 - 32 mmol/L Final     Carbon Dioxide (CO2)   Date Value Ref Range Status   05/24/2022 20 (L) 22 - 31 mmol/L Final     Anion Gap   Date Value Ref Range Status   05/24/2022 9 5 - 18 mmol/L Final   06/30/2018 11 3 - 14 mmol/L Final     Glucose   Date Value Ref Range Status   05/24/2022 173 (H) 70 - 125 mg/dL Final   06/30/2018 162 (H) 70 - 99 mg/dL Final     Urea Nitrogen   Date Value Ref Range Status   05/24/2022 21 8 - 28 mg/dL Final   06/30/2018 22 7 - 30 mg/dL Final     Creatinine   Date Value Ref Range Status   05/24/2022 1.22 0.70 - 1.30 mg/dL Final   06/30/2018 1.20 0.66 - 1.25 mg/dL Final     GFR Estimate   Date Value Ref Range Status   05/24/2022 61 >60 mL/min/1.73m2 Final     Comment:     Effective December 21, 2021 eGFRcr in adults is calculated using the 2021 CKD-EPI creatinine equation which includes age and gender (Denise et al., NEJ, DOI: 10.1056/WCUDzn3628003)   03/09/2021 46 (L) >60 mL/min/1.73m2 Final   06/30/2018 59 (L) >60 mL/min/1.7m2 Final     Comment:     Non  GFR Calc     Calcium   Date Value Ref Range Status   05/24/2022 9.3 8.5 - 10.5 mg/dL Final   06/30/2018 9.0 8.5 - 10.1 mg/dL Final     Bilirubin Total   Date Value Ref Range Status   10/21/2021 1.0 0.0 - 1.0 mg/dL Final   06/29/2018 1.0 0.2 - 1.3 mg/dL Final     Alkaline Phosphatase   Date Value Ref Range Status   10/21/2021 115 45 - 120 U/L Final   06/29/2018 73 40 - 150 U/L Final     ALT   Date Value Ref Range Status   10/21/2021 <9 0 - 45 U/L Final   06/29/2018 25 0 - 70 U/L Final     AST   Date Value Ref Range Status   10/21/2021 13 0 - 40 U/L Final   06/29/2018 24 0 - 45 U/L Final             Lab Results   Component Value Date    A1C 6.3 10/21/2021    A1C 6.9 03/09/2021    A1C 7.6 07/28/2020    A1C 7.2 03/10/2020    A1C 7.4 07/25/2019    A1C 7.7 06/26/2018    A1C 7.3 04/20/2018     Last Comprehensive Metabolic  Panel:  Sodium   Date Value Ref Range Status   05/24/2022 140 136 - 145 mmol/L Final   06/30/2018 139 133 - 144 mmol/L Final     Potassium   Date Value Ref Range Status   05/24/2022 4.3 3.5 - 5.0 mmol/L Final   06/30/2018 4.1 3.4 - 5.3 mmol/L Final     Chloride   Date Value Ref Range Status   05/24/2022 111 (H) 98 - 107 mmol/L Final   06/30/2018 108 94 - 109 mmol/L Final     Carbon Dioxide   Date Value Ref Range Status   06/30/2018 20 20 - 32 mmol/L Final     Carbon Dioxide (CO2)   Date Value Ref Range Status   05/24/2022 20 (L) 22 - 31 mmol/L Final     Anion Gap   Date Value Ref Range Status   05/24/2022 9 5 - 18 mmol/L Final   06/30/2018 11 3 - 14 mmol/L Final     Glucose   Date Value Ref Range Status   05/24/2022 173 (H) 70 - 125 mg/dL Final   06/30/2018 162 (H) 70 - 99 mg/dL Final     Urea Nitrogen   Date Value Ref Range Status   05/24/2022 21 8 - 28 mg/dL Final   06/30/2018 22 7 - 30 mg/dL Final     Creatinine   Date Value Ref Range Status   05/24/2022 1.22 0.70 - 1.30 mg/dL Final   06/30/2018 1.20 0.66 - 1.25 mg/dL Final     GFR Estimate   Date Value Ref Range Status   05/24/2022 61 >60 mL/min/1.73m2 Final     Comment:     Effective December 21, 2021 eGFRcr in adults is calculated using the 2021 CKD-EPI creatinine equation which includes age and gender (Denise et al., NEJ, DOI: 10.1056/GNHFvh8745896)   03/09/2021 46 (L) >60 mL/min/1.73m2 Final   06/30/2018 59 (L) >60 mL/min/1.7m2 Final     Comment:     Non  GFR Calc     Calcium   Date Value Ref Range Status   05/24/2022 9.3 8.5 - 10.5 mg/dL Final   06/30/2018 9.0 8.5 - 10.1 mg/dL Final           ASSESSMENT:    Encounter Diagnoses   Name Primary?     Closed nondisplaced intertrochanteric fracture of left femur with routine healing, subsequent encounter Yes     Acquired lymphedema of lower extremity      Essential hypertension      Type 2 diabetes mellitus with other circulatory complication, with long-term current use of insulin (H)        PLAN:     For pain he can still take a scheduled Tylenol.  Not requiring any oxycodone as needed.  Went over his laboratory studies from May 24.  Went over his blood pressures as well as diabetes and his nutrition.  His appetite is pretty good.  Did have a visit with orthopedics on the 24th his next follow-up visit is Isabel 3.  Also a chance to talk to his Sister Miriam who also gave me some insight into his overall care but also trying to encourage the rehabilitation component.    For documentation purposes total visit 40 minutes to which over 50% was spent with the patient going over his care, left leg, diabetes, hypertension, pain management and the remainder the time was spent talking with his Sister Miriam about all the aforementioned including his laboratory studies and his rehabilitation component.        Electronically signed by: Wagner Mancuso NP          Sincerely,        Wagner Mancuso NP

## 2022-05-31 VITALS
HEIGHT: 72 IN | SYSTOLIC BLOOD PRESSURE: 122 MMHG | TEMPERATURE: 98.1 F | DIASTOLIC BLOOD PRESSURE: 59 MMHG | OXYGEN SATURATION: 98 % | WEIGHT: 196 LBS | RESPIRATION RATE: 18 BRPM | HEART RATE: 58 BPM | BODY MASS INDEX: 26.55 KG/M2

## 2022-06-01 ENCOUNTER — TRANSITIONAL CARE UNIT VISIT (OUTPATIENT)
Dept: GERIATRICS | Facility: CLINIC | Age: 77
End: 2022-06-01
Payer: MEDICARE

## 2022-06-01 DIAGNOSIS — I10 ESSENTIAL HYPERTENSION: Chronic | ICD-10-CM

## 2022-06-01 DIAGNOSIS — I87.2 CHRONIC VENOUS STASIS DERMATITIS: ICD-10-CM

## 2022-06-01 DIAGNOSIS — R10.32 LEFT INGUINAL PAIN: ICD-10-CM

## 2022-06-01 DIAGNOSIS — S72.145D CLOSED NONDISPLACED INTERTROCHANTERIC FRACTURE OF LEFT FEMUR WITH ROUTINE HEALING, SUBSEQUENT ENCOUNTER: Primary | ICD-10-CM

## 2022-06-01 PROCEDURE — 99310 SBSQ NF CARE HIGH MDM 45: CPT | Performed by: FAMILY MEDICINE

## 2022-06-01 NOTE — PROGRESS NOTES
[facility] GERIATRIC SERVICES    Facility:   [facility] ([facility]) [[id number]]   Code Status: FULL CODE      CHIEF COMPLAINT/REASON FOR VISIT:  Chief Complaint   Patient presents with     RECHECK       HISTORY:      HPI: Ever is a 77 year old male Who does remain in the transitional care unit room 105 and who I the opportunity to revisit with once again today not only secondary to his hospitalization May 11, 2022 through May 17, 2022 secondary evaluation after a fall.  He was ambulating in the hallway of his assisted living facility and he was going towards the Pap machine and unassisted he did take a fall did have a left intertrochanteric fracture status post repair as well as today's discussion of rehabilitation, his left groin pain, diabetes, blood pressures, pain management and nutrition.  He recently was seen by orthopedics and the sutures were removed.  His blood pressures during his stay ranging systolically 114-122.  His weight 195 pounds in comparison to May 23 also 195 pounds.  His blood sugars ranging anywhere from 80- 160 and currently on Glucotrol 10 mg in the morning and 15 mg in the evening along with Lantus 35 units at bedtime and Victoza once daily.  At this point his morning blood sugars have been ranging anywhere from 88- 144 over the past week but towards the p.m. the range 130- 209 and so we will keep his current insulin remaining the same at this time.  For his pain he still on Tylenol 650 mg scheduled every 6 hours he also is on Celebrex 200 mg daily.  Regarding the oxycodone as needed he did take a dose last on May 30 but prior to that May 22.  Regarding his left groin pain more than likely it sounds as if this is occurred from the injury we did talk about his left groin as well as strains to the abductor muscles as well as the insertion of those muscles into the pelvis and at this point it does appear that after the exam he does seem to have more of a strain and he can talk this over with  the therapy department and work on his strengthening as well as range of motion.  He states his appetite to be fine.  Can have extra nutrient supplements.  Denies any bowel or bladder problems.  He does feel as if he is making pretty good progress overall.  Did remind him also of his blood work from May 24 and see results below.  He does have room independence and able to use the walker.  Otherwise with a walker and with therapy he is ambulating in the hallway and actually has gone outside a few times.    Past Medical History:   Diagnosis Date     A-fib (H)      MESHA (acute kidney injury) (H)      Atopic keratoconjunctivitis      Atrial fibrillation (H)     Brian Moe: 8/2011 Cardioversion; CHADS2 VASC = 5; he is on warfarin and sotalol      Atrial flutter (H)      Mcgarry's esophagus 1/1/2012    per note of Dr[facility] Tavo [facility]     Bilateral lower leg cellulitis 8/31/2018     Candidiasis of perineum 1/3/2018     Carotid stenosis, asymptomatic, right      CHF (congestive heart failure) (H)      Cholecystitis, acute 8/18/2019     Coronary artery disease due to lipid rich plaque 2000    CABG x2     Diabetes (H)      Diabetic ulcer of both feet (H) 10/31/2017     Dyslexia      Dyslipidemia, goal LDL below 70 2000     Epistaxis      Essential hypertension      Gangrene of left foot (H)      GERD (gastroesophageal reflux disease)      HLD (hyperlipidemia)      HTN (hypertension)      Hyponatremia      MRSA (methicillin resistant Staphylococcus aureus)      Neuropathy      Non-STEMI (non-ST elevated myocardial infarction) (H)      Other atopic dermatitis      Peripheral vascular disease (H)      Pneumonia 6/26/2018     Type 2 diabetes mellitus, without long-term current use of insulin (H)      Unable to function independently 11/13/2017            Family History   Problem Relation Age of Onset     Sudden Death Mother 85.00     CABG Father      Valvular heart disease Father      Esophageal Cancer Father 58.00         "cause of death     No Known Problems Son      No Known Problems Sister      Obesity Sister      Osteoarthritis Sister      No Known Problems Sister      No Known Problems Grandchild      No Known Problems Grandchild       Social History     Socioeconomic History     Marital status:      Spouse name: None     Number of children: 1     Years of education: None     Highest education level: None   Tobacco Use     Smoking status: Former Smoker     Packs/day: 1.00     Years: 36.00     Pack years: 36.00     Types: Cigarettes, Cigarettes     Start date: 1964     Quit date: 2000     Years since quittin.1     Smokeless tobacco: Never Used   Substance and Sexual Activity     Alcohol use: Yes     Alcohol/week: 5.0 standard drinks     Types: 1 Cans of beer per week     Drug use: No     Sexual activity: Not Currently     Partners: Female   Social History Narrative    Ever lived with his son Raciel in 2017, but then he went to [facility] after foot amputation.  Ever states he will move to a facility in [place] in . 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  Our reception staff at [facility] in [place] confirmed on 2018 that Ever brought himself to the campus and did not utilize a family member or ride service.  I  would also note that he states his granddaughter is name Claire and not Leonela, so I corrected that in the chart (ROSALIA Moe MD). He lives in [facility] in  and is now not driving as he states \"I got a ticket\".           REVIEW OF SYSTEM:  He currently denies any new symptoms of cough or cold flus, postnasal drip, wheezing chest pain dizziness vertigo fevers chills nausea vomiting flulike symptoms constipation diarrhea dysuria frequency or urgency.    PHYSICAL EXAM:   Pleasant gentleman in no acute distress.  Head is " normocephalic.  Oropharynx pink and moist.   Lung sounds are clear throughout.  Cardiovascular irregularity, history of PVD along with chronic edema along with his legs being wrapped.  Gastrointestinal does have previous abdominal scars.  Nontender nondistended.  Musculoskeletal good CMS to his left leg.  Sutures are out..  Improving with the rehabilitation.  Pain is managed.  Psychiatric: Pleasant affect.       Current Outpatient Medications:      acetaminophen (TYLENOL) 500 MG tablet, Take 1,000 mg by mouth every 6 hours as needed , Disp: , Rfl:      albuterol (2.5 MG/3ML) 0.083% neb solution, Take 2.5 mg by nebulization every 6 hours as needed, Disp: , Rfl:      furosemide (LASIX) 40 MG tablet, Take 40 mg by mouth daily, Disp: , Rfl:      gentamicin (GARAMYCIN) 0.1 % ointment, Apply 1 Application topically daily, Disp: , Rfl:      glipiZIDE (GLUCOTROL) 10 MG tablet, Take 1 tablet (10 mg) by mouth every morning (before breakfast), Disp: 30 tablet, Rfl: 0     glipiZIDE (GLUCOTROL) 5 MG tablet, Take 3 tablets (15 mg) by mouth every evening, Disp: 30 tablet, Rfl: 0     lisinopril (PRINIVIL/ZESTRIL) 10 MG tablet, Take 0.5 tablets (5 mg) by mouth daily, Disp: 30 tablet, Rfl: 0     loperamide (IMODIUM) 2 MG capsule, Take 2 mg by mouth 3 times daily as needed, Disp: , Rfl:      metoprolol succinate (TOPROL-XL) 50 MG 24 hr tablet, Take 50 mg by mouth 2 times daily, Disp: , Rfl:      miconazole (MICATIN) 2 % cream, Apply topically 2 times daily, Disp: 35 g, Rfl: 0     miconazole (MICATIN; MICRO GUARD) 2 % powder, Apply topically daily, Disp: 43 g, Rfl: 0     Miconazole-Zinc Oxide-Petrolat (VUSION) 0.25-15-81.35 % OINT, Externally apply topically 2 times daily, Disp: , Rfl:      nitroGLYcerin (NITROSTAT) 0.4 MG sublingual tablet, Place 0.4 mg under the tongue every 5 minutes as needed, Disp: , Rfl:      omeprazole (PRILOSEC) 20 MG CR capsule, Take 20 mg by mouth daily, Disp: , Rfl:      simvastatin (ZOCOR) 40 MG tablet,  Take 40 mg by mouth At Bedtime, Disp: , Rfl:      spironolactone (ALDACTONE) 25 MG tablet, Take 25 mg by mouth daily, Disp: , Rfl:      triamcinolone (KENALOG) 0.1 % cream, Apply 1 Application topically daily, Disp: , Rfl:      urea (CARMOL) 10 % cream, Apply 1 Application topically daily as needed, Disp: , Rfl:      warfarin (COUMADIN) 1 MG tablet, Take 1.5 tablets (1.5 mg) by mouth daily, Disp: 30 tablet, Rfl: 0    /59   Pulse 58   Temp 98.1  F (36.7  C)   Resp 18   Ht 1.829 m (6')   Wt 88.9 kg (196 lb)   SpO2 98%   BMI 26.58 kg/m      LABS:   CBC RESULTS: Recent Labs   Lab Test 05/24/22  0752   WBC 8.9   RBC 3.99*   HGB 11.5*   HCT 36.4*   MCV 91   MCH 28.8   MCHC 31.6   RDW 13.3        Last Comprehensive Metabolic Panel:  Sodium   Date Value Ref Range Status   05/24/2022 140 136 - 145 mmol/L Final   06/30/2018 139 133 - 144 mmol/L Final     Potassium   Date Value Ref Range Status   05/24/2022 4.3 3.5 - 5.0 mmol/L Final   06/30/2018 4.1 3.4 - 5.3 mmol/L Final     Chloride   Date Value Ref Range Status   05/24/2022 111 (H) 98 - 107 mmol/L Final   06/30/2018 108 94 - 109 mmol/L Final     Carbon Dioxide   Date Value Ref Range Status   06/30/2018 20 20 - 32 mmol/L Final     Carbon Dioxide (CO2)   Date Value Ref Range Status   05/24/2022 20 (L) 22 - 31 mmol/L Final     Anion Gap   Date Value Ref Range Status   05/24/2022 9 5 - 18 mmol/L Final   06/30/2018 11 3 - 14 mmol/L Final     Glucose   Date Value Ref Range Status   05/24/2022 173 (H) 70 - 125 mg/dL Final   06/30/2018 162 (H) 70 - 99 mg/dL Final     Urea Nitrogen   Date Value Ref Range Status   05/24/2022 21 8 - 28 mg/dL Final   06/30/2018 22 7 - 30 mg/dL Final     Creatinine   Date Value Ref Range Status   05/24/2022 1.22 0.70 - 1.30 mg/dL Final   06/30/2018 1.20 0.66 - 1.25 mg/dL Final     GFR Estimate   Date Value Ref Range Status   05/24/2022 61 >60 mL/min/1.73m2 Final     Comment:     Effective December 21, 2021 eGFRcr in adults is  calculated using the 2021 CKD-EPI creatinine equation which includes age and gender (Denise et al., NE, DOI: 10.1056/IWPDbr6710737)   03/09/2021 46 (L) >60 mL/min/1.73m2 Final   06/30/2018 59 (L) >60 mL/min/1.7m2 Final     Comment:     Non  GFR Calc     Calcium   Date Value Ref Range Status   05/24/2022 9.3 8.5 - 10.5 mg/dL Final   06/30/2018 9.0 8.5 - 10.1 mg/dL Final     Last Comprehensive Metabolic Panel:  Sodium   Date Value Ref Range Status   05/24/2022 140 136 - 145 mmol/L Final   06/30/2018 139 133 - 144 mmol/L Final     Potassium   Date Value Ref Range Status   05/24/2022 4.3 3.5 - 5.0 mmol/L Final   06/30/2018 4.1 3.4 - 5.3 mmol/L Final     Chloride   Date Value Ref Range Status   05/24/2022 111 (H) 98 - 107 mmol/L Final   06/30/2018 108 94 - 109 mmol/L Final     Carbon Dioxide   Date Value Ref Range Status   06/30/2018 20 20 - 32 mmol/L Final     Carbon Dioxide (CO2)   Date Value Ref Range Status   05/24/2022 20 (L) 22 - 31 mmol/L Final     Anion Gap   Date Value Ref Range Status   05/24/2022 9 5 - 18 mmol/L Final   06/30/2018 11 3 - 14 mmol/L Final     Glucose   Date Value Ref Range Status   05/24/2022 173 (H) 70 - 125 mg/dL Final   06/30/2018 162 (H) 70 - 99 mg/dL Final     Urea Nitrogen   Date Value Ref Range Status   05/24/2022 21 8 - 28 mg/dL Final   06/30/2018 22 7 - 30 mg/dL Final     Creatinine   Date Value Ref Range Status   05/24/2022 1.22 0.70 - 1.30 mg/dL Final   06/30/2018 1.20 0.66 - 1.25 mg/dL Final     GFR Estimate   Date Value Ref Range Status   05/24/2022 61 >60 mL/min/1.73m2 Final     Comment:     Effective December 21, 2021 eGFRcr in adults is calculated using the 2021 CKD-EPI creatinine equation which includes age and gender (Denise gates al., NEJM, DOI: 10.1056/TNYUjo3856041)   03/09/2021 46 (L) >60 mL/min/1.73m2 Final   06/30/2018 59 (L) >60 mL/min/1.7m2 Final     Comment:     Non  GFR Calc     Calcium   Date Value Ref Range Status   05/24/2022 9.3 8.5 -  10.5 mg/dL Final   06/30/2018 9.0 8.5 - 10.1 mg/dL Final     Bilirubin Total   Date Value Ref Range Status   10/21/2021 1.0 0.0 - 1.0 mg/dL Final   06/29/2018 1.0 0.2 - 1.3 mg/dL Final     Alkaline Phosphatase   Date Value Ref Range Status   10/21/2021 115 45 - 120 U/L Final   06/29/2018 73 40 - 150 U/L Final     ALT   Date Value Ref Range Status   10/21/2021 <9 0 - 45 U/L Final   06/29/2018 25 0 - 70 U/L Final     AST   Date Value Ref Range Status   10/21/2021 13 0 - 40 U/L Final   06/29/2018 24 0 - 45 U/L Final                   ASSESSMENT:    Encounter Diagnoses   Name Primary?     Closed nondisplaced intertrochanteric fracture of left femur with routine healing, subsequent encounter Yes     Chronic venous stasis dermatitis      Essential hypertension      Left inguinal pain        PLAN:    Had a lengthy conversation with him today as well regarding all his chronic medical conditions.  He did feel comfortable with her visit as well as discussing his blood pressures, blood sugars, his current medications and keeping the same at this time, pain management with Celebrex and Tylenol and can have oxycodone as needed but rarely using the oxycodone, nutrition, extra nutrient supplements.  Recent visit with orthopedics and his next follow-up visit is on Isabel 3 as well as discussion of his rehabilitation as well as is wanting to go back to his assisted living facility and discussing his left groin pain.    For documentation purposes total visit 35 minutes which over 50% was spent with the patient going over his past medical history, recent laboratory studies, blood pressures, diabetes, pain management, left groin, follow-up with orthopedics, pain, nutrition and the rehabilitation component.        Electronically signed by: Wagner Mancuso NP

## 2022-06-01 NOTE — LETTER
6/1/2022        RE: Ever Crane  [address]  [facility] [address]        [facility] GERIATRIC SERVICES    Facility:   [facility] ([facility]) [[id number]]   Code Status: FULL CODE      CHIEF COMPLAINT/REASON FOR VISIT:  Chief Complaint   Patient presents with     RECHECK       HISTORY:      HPI: Ever is a 77 year old male Who does remain in the transitional care unit room 105 and who I the opportunity to revisit with once again today not only secondary to his hospitalization May 11, 2022 through May 17, 2022 secondary evaluation after a fall.  He was ambulating in the hallway of his assisted living facility and he was going towards the Pap machine and unassisted he did take a fall did have a left intertrochanteric fracture status post repair as well as today's discussion of rehabilitation, his left groin pain, diabetes, blood pressures, pain management and nutrition.  He recently was seen by orthopedics and the sutures were removed.  His blood pressures during his stay ranging systolically 114-122.  His weight 195 pounds in comparison to May 23 also 195 pounds.  His blood sugars ranging anywhere from 80- 160 and currently on Glucotrol 10 mg in the morning and 15 mg in the evening along with Lantus 35 units at bedtime and Victoza once daily.  At this point his morning blood sugars have been ranging anywhere from 88- 144 over the past week but towards the p.m. the range 130- 209 and so we will keep his current insulin remaining the same at this time.  For his pain he still on Tylenol 650 mg scheduled every 6 hours he also is on Celebrex 200 mg daily.  Regarding the oxycodone as needed he did take a dose last on May 30 but prior to that May 22.  Regarding his left groin pain more than likely it sounds as if this is occurred from the injury we did talk about his left groin as well as strains to the abductor muscles as well as the insertion of those muscles into the pelvis and at this point it does appear that  after the exam he does seem to have more of a strain and he can talk this over with the therapy department and work on his strengthening as well as range of motion.  He states his appetite to be fine.  Can have extra nutrient supplements.  Denies any bowel or bladder problems.  He does feel as if he is making pretty good progress overall.  Did remind him also of his blood work from May 24 and see results below.  He does have room independence and able to use the walker.  Otherwise with a walker and with therapy he is ambulating in the hallway and actually has gone outside a few times.    Past Medical History:   Diagnosis Date     A-fib (H)      MESHA (acute kidney injury) (H)      Atopic keratoconjunctivitis      Atrial fibrillation (H)     Brian Moe: 8/2011 Cardioversion; CHADS2 VASC = 5; he is on warfarin and sotalol      Atrial flutter (H)      Mcgarry's esophagus 1/1/2012    per note of Dr. Tavo Mike of [facility]     Bilateral lower leg cellulitis 8/31/2018     Candidiasis of perineum 1/3/2018     Carotid stenosis, asymptomatic, right      CHF (congestive heart failure) (H)      Cholecystitis, acute 8/18/2019     Coronary artery disease due to lipid rich plaque 2000    CABG x2     Diabetes (H)      Diabetic ulcer of both feet (H) 10/31/2017     Dyslexia      Dyslipidemia, goal LDL below 70 2000     Epistaxis      Essential hypertension      Gangrene of left foot (H)      GERD (gastroesophageal reflux disease)      HLD (hyperlipidemia)      HTN (hypertension)      Hyponatremia      MRSA (methicillin resistant Staphylococcus aureus)      Neuropathy      Non-STEMI (non-ST elevated myocardial infarction) (H)      Other atopic dermatitis      Peripheral vascular disease (H)      Pneumonia 6/26/2018     Type 2 diabetes mellitus, without long-term current use of insulin (H)      Unable to function independently 11/13/2017            Family History   Problem Relation Age of Onset     Sudden Death Mother 85.00     CABG  "Father      Valvular heart disease Father      Esophageal Cancer Father 58.00        cause of death     No Known Problems Son      No Known Problems Sister      Obesity Sister      Osteoarthritis Sister      No Known Problems Sister      No Known Problems Grandchild      No Known Problems Grandchild       Social History     Socioeconomic History     Marital status:      Spouse name: None     Number of children: 1     Years of education: None     Highest education level: None   Tobacco Use     Smoking status: Former Smoker     Packs/day: 1.00     Years: 36.00     Pack years: 36.00     Types: Cigarettes, Cigarettes     Start date: 1964     Quit date: 2000     Years since quittin.1     Smokeless tobacco: Never Used   Substance and Sexual Activity     Alcohol use: Yes     Alcohol/week: 5.0 standard drinks     Types: 1 Cans of beer per week     Drug use: No     Sexual activity: Not Currently     Partners: Female   Social History Narrative    Ever lived with his son Raciel in 2017, but then he went to [facility] after foot amputation.  Ever states he will move to a facility in [place] in . 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  Our reception staff at [facility] in [place] confirmed on 2018 that Ever brought himself to the campus and did not utilize a family member or ride service.  I  would also note that he states his granddaughter is name Claire and not Leonela, so I corrected that in the chart (ROSALIA Moe MD). He lives in [facility] in  and is now not driving as he states \"I got a ticket\".           REVIEW OF SYSTEM:  He currently denies any new symptoms of cough or cold flus, postnasal drip, wheezing chest pain dizziness vertigo fevers chills nausea vomiting flulike symptoms constipation diarrhea dysuria " frequency or urgency.    PHYSICAL EXAM:   Pleasant gentleman in no acute distress.  Head is normocephalic.  Oropharynx pink and moist.   Lung sounds are clear throughout.  Cardiovascular irregularity, history of PVD along with chronic edema along with his legs being wrapped.  Gastrointestinal does have previous abdominal scars.  Nontender nondistended.  Musculoskeletal good CMS to his left leg.  Sutures are out..  Improving with the rehabilitation.  Pain is managed.  Psychiatric: Pleasant affect.       Current Outpatient Medications:      acetaminophen (TYLENOL) 500 MG tablet, Take 1,000 mg by mouth every 6 hours as needed , Disp: , Rfl:      albuterol (2.5 MG/3ML) 0.083% neb solution, Take 2.5 mg by nebulization every 6 hours as needed, Disp: , Rfl:      furosemide (LASIX) 40 MG tablet, Take 40 mg by mouth daily, Disp: , Rfl:      gentamicin (GARAMYCIN) 0.1 % ointment, Apply 1 Application topically daily, Disp: , Rfl:      glipiZIDE (GLUCOTROL) 10 MG tablet, Take 1 tablet (10 mg) by mouth every morning (before breakfast), Disp: 30 tablet, Rfl: 0     glipiZIDE (GLUCOTROL) 5 MG tablet, Take 3 tablets (15 mg) by mouth every evening, Disp: 30 tablet, Rfl: 0     lisinopril (PRINIVIL/ZESTRIL) 10 MG tablet, Take 0.5 tablets (5 mg) by mouth daily, Disp: 30 tablet, Rfl: 0     loperamide (IMODIUM) 2 MG capsule, Take 2 mg by mouth 3 times daily as needed, Disp: , Rfl:      metoprolol succinate (TOPROL-XL) 50 MG 24 hr tablet, Take 50 mg by mouth 2 times daily, Disp: , Rfl:      miconazole (MICATIN) 2 % cream, Apply topically 2 times daily, Disp: 35 g, Rfl: 0     miconazole (MICATIN; MICRO GUARD) 2 % powder, Apply topically daily, Disp: 43 g, Rfl: 0     Miconazole-Zinc Oxide-Petrolat (VUSION) 0.25-15-81.35 % OINT, Externally apply topically 2 times daily, Disp: , Rfl:      nitroGLYcerin (NITROSTAT) 0.4 MG sublingual tablet, Place 0.4 mg under the tongue every 5 minutes as needed, Disp: , Rfl:      omeprazole (PRILOSEC) 20 MG CR  capsule, Take 20 mg by mouth daily, Disp: , Rfl:      simvastatin (ZOCOR) 40 MG tablet, Take 40 mg by mouth At Bedtime, Disp: , Rfl:      spironolactone (ALDACTONE) 25 MG tablet, Take 25 mg by mouth daily, Disp: , Rfl:      triamcinolone (KENALOG) 0.1 % cream, Apply 1 Application topically daily, Disp: , Rfl:      urea (CARMOL) 10 % cream, Apply 1 Application topically daily as needed, Disp: , Rfl:      warfarin (COUMADIN) 1 MG tablet, Take 1.5 tablets (1.5 mg) by mouth daily, Disp: 30 tablet, Rfl: 0    /59   Pulse 58   Temp 98.1  F (36.7  C)   Resp 18   Ht 1.829 m (6')   Wt 88.9 kg (196 lb)   SpO2 98%   BMI 26.58 kg/m      LABS:   CBC RESULTS: Recent Labs   Lab Test 05/24/22  0752   WBC 8.9   RBC 3.99*   HGB 11.5*   HCT 36.4*   MCV 91   MCH 28.8   MCHC 31.6   RDW 13.3        Last Comprehensive Metabolic Panel:  Sodium   Date Value Ref Range Status   05/24/2022 140 136 - 145 mmol/L Final   06/30/2018 139 133 - 144 mmol/L Final     Potassium   Date Value Ref Range Status   05/24/2022 4.3 3.5 - 5.0 mmol/L Final   06/30/2018 4.1 3.4 - 5.3 mmol/L Final     Chloride   Date Value Ref Range Status   05/24/2022 111 (H) 98 - 107 mmol/L Final   06/30/2018 108 94 - 109 mmol/L Final     Carbon Dioxide   Date Value Ref Range Status   06/30/2018 20 20 - 32 mmol/L Final     Carbon Dioxide (CO2)   Date Value Ref Range Status   05/24/2022 20 (L) 22 - 31 mmol/L Final     Anion Gap   Date Value Ref Range Status   05/24/2022 9 5 - 18 mmol/L Final   06/30/2018 11 3 - 14 mmol/L Final     Glucose   Date Value Ref Range Status   05/24/2022 173 (H) 70 - 125 mg/dL Final   06/30/2018 162 (H) 70 - 99 mg/dL Final     Urea Nitrogen   Date Value Ref Range Status   05/24/2022 21 8 - 28 mg/dL Final   06/30/2018 22 7 - 30 mg/dL Final     Creatinine   Date Value Ref Range Status   05/24/2022 1.22 0.70 - 1.30 mg/dL Final   06/30/2018 1.20 0.66 - 1.25 mg/dL Final     GFR Estimate   Date Value Ref Range Status   05/24/2022 61 >60  mL/min/1.73m2 Final     Comment:     Effective December 21, 2021 eGFRcr in adults is calculated using the 2021 CKD-EPI creatinine equation which includes age and gender (Denise et al., NE, DOI: 10.1056/DMXNjn1039430)   03/09/2021 46 (L) >60 mL/min/1.73m2 Final   06/30/2018 59 (L) >60 mL/min/1.7m2 Final     Comment:     Non  GFR Calc     Calcium   Date Value Ref Range Status   05/24/2022 9.3 8.5 - 10.5 mg/dL Final   06/30/2018 9.0 8.5 - 10.1 mg/dL Final     Last Comprehensive Metabolic Panel:  Sodium   Date Value Ref Range Status   05/24/2022 140 136 - 145 mmol/L Final   06/30/2018 139 133 - 144 mmol/L Final     Potassium   Date Value Ref Range Status   05/24/2022 4.3 3.5 - 5.0 mmol/L Final   06/30/2018 4.1 3.4 - 5.3 mmol/L Final     Chloride   Date Value Ref Range Status   05/24/2022 111 (H) 98 - 107 mmol/L Final   06/30/2018 108 94 - 109 mmol/L Final     Carbon Dioxide   Date Value Ref Range Status   06/30/2018 20 20 - 32 mmol/L Final     Carbon Dioxide (CO2)   Date Value Ref Range Status   05/24/2022 20 (L) 22 - 31 mmol/L Final     Anion Gap   Date Value Ref Range Status   05/24/2022 9 5 - 18 mmol/L Final   06/30/2018 11 3 - 14 mmol/L Final     Glucose   Date Value Ref Range Status   05/24/2022 173 (H) 70 - 125 mg/dL Final   06/30/2018 162 (H) 70 - 99 mg/dL Final     Urea Nitrogen   Date Value Ref Range Status   05/24/2022 21 8 - 28 mg/dL Final   06/30/2018 22 7 - 30 mg/dL Final     Creatinine   Date Value Ref Range Status   05/24/2022 1.22 0.70 - 1.30 mg/dL Final   06/30/2018 1.20 0.66 - 1.25 mg/dL Final     GFR Estimate   Date Value Ref Range Status   05/24/2022 61 >60 mL/min/1.73m2 Final     Comment:     Effective December 21, 2021 eGFRcr in adults is calculated using the 2021 CKD-EPI creatinine equation which includes age and gender (Denise et al., NEJM, DOI: 10.1056/CGVRbl2349288)   03/09/2021 46 (L) >60 mL/min/1.73m2 Final   06/30/2018 59 (L) >60 mL/min/1.7m2 Final     Comment:     Non   GFR Calc     Calcium   Date Value Ref Range Status   05/24/2022 9.3 8.5 - 10.5 mg/dL Final   06/30/2018 9.0 8.5 - 10.1 mg/dL Final     Bilirubin Total   Date Value Ref Range Status   10/21/2021 1.0 0.0 - 1.0 mg/dL Final   06/29/2018 1.0 0.2 - 1.3 mg/dL Final     Alkaline Phosphatase   Date Value Ref Range Status   10/21/2021 115 45 - 120 U/L Final   06/29/2018 73 40 - 150 U/L Final     ALT   Date Value Ref Range Status   10/21/2021 <9 0 - 45 U/L Final   06/29/2018 25 0 - 70 U/L Final     AST   Date Value Ref Range Status   10/21/2021 13 0 - 40 U/L Final   06/29/2018 24 0 - 45 U/L Final                   ASSESSMENT:    Encounter Diagnoses   Name Primary?     Closed nondisplaced intertrochanteric fracture of left femur with routine healing, subsequent encounter Yes     Chronic venous stasis dermatitis      Essential hypertension      Left inguinal pain        PLAN:    Had a lengthy conversation with him today as well regarding all his chronic medical conditions.  He did feel comfortable with her visit as well as discussing his blood pressures, blood sugars, his current medications and keeping the same at this time, pain management with Celebrex and Tylenol and can have oxycodone as needed but rarely using the oxycodone, nutrition, extra nutrient supplements.  Recent visit with orthopedics and his next follow-up visit is on Isabel 3 as well as discussion of his rehabilitation as well as is wanting to go back to his assisted living facility and discussing his left groin pain.    For documentation purposes total visit 35 minutes which over 50% was spent with the patient going over his past medical history, recent laboratory studies, blood pressures, diabetes, pain management, left groin, follow-up with orthopedics, pain, nutrition and the rehabilitation component.        Electronically signed by: Wagner Mancuso NP          Sincerely,        Wagner Mancuso NP

## 2022-06-07 VITALS
TEMPERATURE: 97.8 F | DIASTOLIC BLOOD PRESSURE: 59 MMHG | SYSTOLIC BLOOD PRESSURE: 143 MMHG | HEART RATE: 63 BPM | RESPIRATION RATE: 18 BRPM | BODY MASS INDEX: 26.32 KG/M2 | WEIGHT: 194.1 LBS | OXYGEN SATURATION: 96 %

## 2022-06-07 PROBLEM — Z79.01 LONG TERM CURRENT USE OF ANTICOAGULANT THERAPY: Status: ACTIVE | Noted: 2022-06-07

## 2022-06-07 PROBLEM — E11.42 POLYNEUROPATHY DUE TO TYPE 2 DIABETES MELLITUS (H): Status: ACTIVE | Noted: 2022-06-07

## 2022-06-07 PROBLEM — E87.5 HYPERKALEMIA: Status: ACTIVE | Noted: 2022-06-07

## 2022-06-07 PROBLEM — I50.31 ACUTE DIASTOLIC HEART FAILURE (H): Status: ACTIVE | Noted: 2022-06-07

## 2022-06-07 PROBLEM — I48.19 PERSISTENT ATRIAL FIBRILLATION (H): Status: ACTIVE | Noted: 2022-05-12

## 2022-06-07 PROBLEM — Z89.439 STATUS POST AMPUTATION OF FOOT (H): Status: ACTIVE | Noted: 2022-06-07

## 2022-06-08 ENCOUNTER — TRANSITIONAL CARE UNIT VISIT (OUTPATIENT)
Dept: GERIATRICS | Facility: CLINIC | Age: 77
End: 2022-06-08
Payer: MEDICARE

## 2022-06-08 DIAGNOSIS — E11.59 TYPE 2 DIABETES MELLITUS WITH OTHER CIRCULATORY COMPLICATION, WITH LONG-TERM CURRENT USE OF INSULIN (H): Primary | ICD-10-CM

## 2022-06-08 DIAGNOSIS — Z89.432 STATUS POST AMPUTATION OF LEFT FOOT (H): ICD-10-CM

## 2022-06-08 DIAGNOSIS — S72.145D CLOSED NONDISPLACED INTERTROCHANTERIC FRACTURE OF LEFT FEMUR WITH ROUTINE HEALING, SUBSEQUENT ENCOUNTER: ICD-10-CM

## 2022-06-08 DIAGNOSIS — Z79.4 TYPE 2 DIABETES MELLITUS WITH OTHER CIRCULATORY COMPLICATION, WITH LONG-TERM CURRENT USE OF INSULIN (H): Primary | ICD-10-CM

## 2022-06-08 DIAGNOSIS — I10 ESSENTIAL HYPERTENSION: Chronic | ICD-10-CM

## 2022-06-08 PROCEDURE — 99310 SBSQ NF CARE HIGH MDM 45: CPT | Performed by: FAMILY MEDICINE

## 2022-06-08 NOTE — LETTER
6/8/2022        RE: Ever Crane  [address]  [facility] [address]        [facility] GERIATRIC SERVICES    Facility:   [facility] ([facility]) [[id number]]   Code Status: FULL CODE      CHIEF COMPLAINT/REASON FOR VISIT:  Chief Complaint   Patient presents with     RECHECK       HISTORY:      HPI: Ever is a 77 year old male who I had the pleasure revisiting with once again today not only secondary to his hospitalization May 11 through May 17, 2022 secondary evaluation after a fall.  He was at his assisted living facility going towards the Pop machine unassisted without using his walker he did have a fall sustaining a left intertrochanteric fracture status post repair as well as today's discussion of his rehabilitation, pain management, diabetes and nutrition.  His systolic blood pressures ranging 120-140 he has been afebrile and also on room air.  Most recent weight 194 pounds in comparison to June 2 295 pounds.  Morning time blood sugars ranging 100-117 towards the p.m. the range  and now we will decrease the Lantus to 30 units in the at bedtime rather than 35 units continue with the Glucotrol as well as Victoza.  For pain he is on scheduled Tylenol every 6 hours has not required any oxycodone so I will discontinue that medication due to nonuse he also has not used his Voltaren gel.  We did again have a conversation today regarding his abductor-left thigh-which he does have good range of motion he states that sometimes it feels tight in that area but when he does exercise it does loosen up.  We had a chance to address the rehabilitation component and overall he states he is fairly bored because just walking up and down the hallway.  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  He states that he does feel comfortable ambulating with that left forefoot amputation however at times he does not always have the confidence for a fear of another fall.  Regarding his respiratory status he does have his albuterol  inhaler twice daily as well as Mucinex and he like to stay with that at this time.  For reflux is on omeprazole 20 mg in the left hip looks wonderful.  I also tried to call us Sister Miriam but there was no answer.    Past Medical History:   Diagnosis Date     A-fib (H)      MESHA (acute kidney injury) (H)      Atopic keratoconjunctivitis      Atrial fibrillation (H)     Brian Moe: 8/2011 Cardioversion; CHADS2 VASC = 5; he is on warfarin and sotalol      Atrial flutter (H)      Mcgarry's esophagus 1/1/2012    per note of Dr. Tavo Mike of [facility]     Bilateral lower leg cellulitis 8/31/2018     Candidiasis of perineum 1/3/2018     Carotid stenosis, asymptomatic, right      CHF (congestive heart failure) (H)      Cholecystitis, acute 8/18/2019     Coronary artery disease due to lipid rich plaque 2000    CABG x2     Diabetes (H)      Diabetic ulcer of both feet (H) 10/31/2017     Dyslexia      Dyslipidemia, goal LDL below 70 2000     Epistaxis      Essential hypertension      Gangrene of left foot (H)      GERD (gastroesophageal reflux disease)      HLD (hyperlipidemia)      HTN (hypertension)      Hyponatremia      MRSA (methicillin resistant Staphylococcus aureus)      Neuropathy      Non-STEMI (non-ST elevated myocardial infarction) (H)      Other atopic dermatitis      Peripheral vascular disease (H)      Pneumonia 6/26/2018     Type 2 diabetes mellitus, without long-term current use of insulin (H)      Unable to function independently 11/13/2017            Family History   Problem Relation Age of Onset     Sudden Death Mother 85.00     CABG Father      Valvular heart disease Father      Esophageal Cancer Father 58.00        cause of death     No Known Problems Son      No Known Problems Sister      Obesity Sister      Osteoarthritis Sister      No Known Problems Sister      No Known Problems Grandchild      No Known Problems Grandchild       Social History     Socioeconomic History     Marital status:       "Number of children: 1   Tobacco Use     Smoking status: Former Smoker     Packs/day: 1.00     Years: 36.00     Pack years: 36.00     Types: Cigarettes, Cigarettes     Start date: 1964     Quit date: 2000     Years since quittin.1     Smokeless tobacco: Never Used   Substance and Sexual Activity     Alcohol use: Yes     Alcohol/week: 5.0 standard drinks     Types: 1 Cans of beer per week     Drug use: No     Sexual activity: Not Currently     Partners: Female   Social History Narrative    Ever lived with his son Racile in 2017, but then he went to [facility] after foot amputation.  Ever states he will move to a facility in [place] in . 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  Our reception staff at [facility] in [place] confirmed on 2018 that Ever brought himself to the campus and did not utilize a family member or ride service.  I  would also note that he states his granddaughter is name Claire and not Leonela, so I corrected that in the chart (ROSALIA Moe MD). He lives in [facility] in  and is now not driving as he states \"I got a ticket\".           REVIEW OF SYSTEM:  He currently denies any new symptoms of cough or cold flus, postnasal drip, wheezing chest pain dizziness vertigo fevers chills nausea vomiting flulike symptoms constipation diarrhea dysuria frequency or urgency.    PHYSICAL EXAM:   Pleasant gentleman in no acute distress.  Head is normocephalic.    Lung sounds are clear throughout.  Cardiovascular irregularity, history of PVD along with chronic edema along with his legs being wrapped.  Gastrointestinal does have previous abdominal scars.  Nontender nondistended.  Musculoskeletal good CMS to his left leg.  Left forefoot amputation.  Sutures are out..  Improving with the rehabilitation.  Pain is managed. "  Psychiatric: Pleasant affect.  Left inner thigh abductor muscles are overall nontender to palpation.  Able to flex and extend abduct and adduct and also do straight leg raises as well as ambulate.    Current Outpatient Medications:      acetaminophen (TYLENOL) 325 MG tablet, Take 650 mg by mouth every 6 hours, Disp: , Rfl:      albuterol (2.5 MG/3ML) 0.083% neb solution, Take 2.5 mg by nebulization every 6 hours as needed, Disp: , Rfl:      albuterol (PROAIR HFA/PROVENTIL HFA/VENTOLIN HFA) 108 (90 Base) MCG/ACT inhaler, Inhale 2 puffs into the lungs 2 times daily And q4h PRN, Disp: , Rfl:      alum & mag hydroxide-simethicone (MAALOX) 200-200-20 MG/5ML SUSP suspension, Take 30 mLs by mouth every 4 hours as needed for indigestion, Disp: , Rfl:      bisacodyl (DULCOLAX) 5 MG EC tablet, Take 5 mg by mouth daily as needed for constipation, Disp: , Rfl:      calcium polycarbophil (FIBERCON) 625 MG tablet, Take 1 tablet by mouth daily, Disp: , Rfl:      celecoxib (CELEBREX) 200 MG capsule, Take 200 mg by mouth daily, Disp: , Rfl:      cetirizine (ZYRTEC) 10 MG tablet, Take 10 mg by mouth daily, Disp: , Rfl:      cyanocobalamin (VITAMIN B-12) 2500 MCG SUBL sublingual tablet, Place 2,500 mcg under the tongue daily, Disp: , Rfl:      glipiZIDE (GLUCOTROL) 10 MG tablet, Take 1 tablet (10 mg) by mouth every morning (before breakfast), Disp: 30 tablet, Rfl: 0     glipiZIDE (GLUCOTROL) 5 MG tablet, Take 3 tablets (15 mg) by mouth every evening, Disp: 30 tablet, Rfl: 0     guaiFENesin (MUCINEX) 600 MG 12 hr tablet, Take 600 mg by mouth 2 times daily, Disp: , Rfl:      insulin glargine (LANTUS PEN) 100 UNIT/ML pen, Inject 30 Units Subcutaneous At Bedtime, Disp: , Rfl:      ipratropium (ATROVENT) 0.06 % nasal spray, Spray 1 spray into both nostrils 4 times daily as needed for rhinitis, Disp: , Rfl:      ketoconazole (NIZORAL) 2 % external shampoo, Apply topically twice a week Mon and Fri., Disp: , Rfl:      liraglutide (VICTOZA)  18 MG/3ML solution, Inject 0.6 mg Subcutaneous daily, Disp: , Rfl:      mineral oil-hydrophilic petrolatum (AQUAPHOR) external ointment, Apply topically 2 times daily, Disp: , Rfl:      nitroGLYcerin (NITROSTAT) 0.4 MG sublingual tablet, Place 0.4 mg under the tongue every 5 minutes as needed, Disp: , Rfl:      omeprazole (PRILOSEC) 20 MG CR capsule, Take 20 mg by mouth daily, Disp: , Rfl:      polyethylene glycol (MIRALAX) 17 g packet, Take 1 packet by mouth daily as needed for constipation, Disp: , Rfl:      rivaroxaban ANTICOAGULANT (XARELTO) 15 MG TABS tablet, Take by mouth daily (with dinner), Disp: , Rfl:      senna-docusate (SENOKOT-S/PERICOLACE) 8.6-50 MG tablet, Take 1 tablet by mouth 2 times daily And BID PRN, Disp: , Rfl:      simvastatin (ZOCOR) 40 MG tablet, Take 40 mg by mouth At Bedtime, Disp: , Rfl:      spironolactone (ALDACTONE) 25 MG tablet, Take 25 mg by mouth daily, Disp: , Rfl:      triamcinolone (KENALOG) 0.1 % cream, Apply 1 Application topically daily, Disp: , Rfl:      urea (CARMOL) 10 % cream, Apply 1 Application topically daily as needed, Disp: , Rfl:     BP (!) 143/59   Pulse 63   Temp 97.8  F (36.6  C)   Resp 18   Wt 88 kg (194 lb 1.6 oz)   SpO2 96%   BMI 26.32 kg/m      LABS:   Lab Results   Component Value Date    A1C 6.3 10/21/2021    A1C 6.9 03/09/2021    A1C 7.6 07/28/2020    A1C 7.2 03/10/2020    A1C 7.4 07/25/2019    A1C 7.7 06/26/2018    A1C 7.3 04/20/2018           ASSESSMENT:    Encounter Diagnoses   Name Primary?     Type 2 diabetes mellitus with other circulatory complication, with long-term current use of insulin (H) Yes     Closed nondisplaced intertrochanteric fracture of left femur with routine healing, subsequent encounter      Status post amputation of left foot (H)      Essential hypertension        PLAN:    Decreasing his Lantus 30 units at at bedtime rather than 35 units continue the Glucotrol and Victoza.  Due to nonuse discontinue the oxycodone as needed.   Had the conversation today again regarding his rehabilitation, his progress that he has made, nutrition, his medication management as well as the potential to go home soon within the near future.  His Sister Miriam was unavailable to discuss any of these concerns.    For documentation purposes total visit 35 minutes to which over 50% was spent with the patient going over his left thigh, abductor muscles, his surgery, his progress, diabetes, hypertension, nutrition, history of falls especially with the left forefoot amputation, as well as a potential for discharge soon to home.        Electronically signed by: Wagner Mancuso NP          Sincerely,        Wagner Mancuso NP

## 2022-06-08 NOTE — PROGRESS NOTES
[facility] GERIATRIC SERVICES    Facility:   [facility] ([facility]) [[id number]]   Code Status: FULL CODE      CHIEF COMPLAINT/REASON FOR VISIT:  Chief Complaint   Patient presents with     RECHECK       HISTORY:      HPI: Ever is a 77 year old male who I had the pleasure revisiting with once again today not only secondary to his hospitalization May 11 through May 17, 2022 secondary evaluation after a fall.  He was at his assisted living facility going towards the Pop machine unassisted without using his walker he did have a fall sustaining a left intertrochanteric fracture status post repair as well as today's discussion of his rehabilitation, pain management, diabetes and nutrition.  His systolic blood pressures ranging 120-140 he has been afebrile and also on room air.  Most recent weight 194 pounds in comparison to June 2 295 pounds.  Morning time blood sugars ranging 100-117 towards the p.m. the range  and now we will decrease the Lantus to 30 units in the at bedtime rather than 35 units continue with the Glucotrol as well as Victoza.  For pain he is on scheduled Tylenol every 6 hours has not required any oxycodone so I will discontinue that medication due to nonuse he also has not used his Voltaren gel.  We did again have a conversation today regarding his abductor-left thigh-which he does have good range of motion he states that sometimes it feels tight in that area but when he does exercise it does loosen up.  We had a chance to address the rehabilitation component and overall he states he is fairly bored because just walking up and down the hallway.  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  He states that he does feel comfortable ambulating with that left forefoot amputation however at times he does not always have the confidence for a fear of another fall.  Regarding his respiratory status he does have his albuterol inhaler twice daily as well as Mucinex and he like to stay with that at this time.   For reflux is on omeprazole 20 mg in the left hip looks wonderful.  I also tried to call us Sister Miriam but there was no answer.    Past Medical History:   Diagnosis Date     A-fib (H)      MESHA (acute kidney injury) (H)      Atopic keratoconjunctivitis      Atrial fibrillation (H)     Brian Moe: 8/2011 Cardioversion; CHADS2 VASC = 5; he is on warfarin and sotalol      Atrial flutter (H)      Mcgarry's esophagus 1/1/2012    per note of Dr. Tavo Mike of [facility]     Bilateral lower leg cellulitis 8/31/2018     Candidiasis of perineum 1/3/2018     Carotid stenosis, asymptomatic, right      CHF (congestive heart failure) (H)      Cholecystitis, acute 8/18/2019     Coronary artery disease due to lipid rich plaque 2000    CABG x2     Diabetes (H)      Diabetic ulcer of both feet (H) 10/31/2017     Dyslexia      Dyslipidemia, goal LDL below 70 2000     Epistaxis      Essential hypertension      Gangrene of left foot (H)      GERD (gastroesophageal reflux disease)      HLD (hyperlipidemia)      HTN (hypertension)      Hyponatremia      MRSA (methicillin resistant Staphylococcus aureus)      Neuropathy      Non-STEMI (non-ST elevated myocardial infarction) (H)      Other atopic dermatitis      Peripheral vascular disease (H)      Pneumonia 6/26/2018     Type 2 diabetes mellitus, without long-term current use of insulin (H)      Unable to function independently 11/13/2017            Family History   Problem Relation Age of Onset     Sudden Death Mother 85.00     CABG Father      Valvular heart disease Father      Esophageal Cancer Father 58.00        cause of death     No Known Problems Son      No Known Problems Sister      Obesity Sister      Osteoarthritis Sister      No Known Problems Sister      No Known Problems Grandchild      No Known Problems Grandchild       Social History     Socioeconomic History     Marital status:      Number of children: 1   Tobacco Use     Smoking status: Former Smoker     Packs/day:  "1.00     Years: 36.00     Pack years: 36.00     Types: Cigarettes, Cigarettes     Start date: 1964     Quit date: 2000     Years since quittin.1     Smokeless tobacco: Never Used   Substance and Sexual Activity     Alcohol use: Yes     Alcohol/week: 5.0 standard drinks     Types: 1 Cans of beer per week     Drug use: No     Sexual activity: Not Currently     Partners: Female   Social History Narrative    Ever lived with his son Raciel in 2017, but then he went to [facility] after foot amputation.  Ever states he will move to a facility in [place] in . 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  Our reception staff at [facility] in [place] confirmed on 2018 that Ever brought himself to the campus and did not utilize a family member or ride service.  I  would also note that he states his granddaughter is name Claire and not Leonela, so I corrected that in the chart (ROSALIA Moe MD). He lives in [facility] in  and is now not driving as he states \"I got a ticket\".           REVIEW OF SYSTEM:  He currently denies any new symptoms of cough or cold flus, postnasal drip, wheezing chest pain dizziness vertigo fevers chills nausea vomiting flulike symptoms constipation diarrhea dysuria frequency or urgency.    PHYSICAL EXAM:   Pleasant gentleman in no acute distress.  Head is normocephalic.    Lung sounds are clear throughout.  Cardiovascular irregularity, history of PVD along with chronic edema along with his legs being wrapped.  Gastrointestinal does have previous abdominal scars.  Nontender nondistended.  Musculoskeletal good CMS to his left leg.  Left forefoot amputation.  Sutures are out..  Improving with the rehabilitation.  Pain is managed.  Psychiatric: Pleasant affect.  Left inner thigh abductor muscles are overall nontender " to palpation.  Able to flex and extend abduct and adduct and also do straight leg raises as well as ambulate.    Current Outpatient Medications:      acetaminophen (TYLENOL) 325 MG tablet, Take 650 mg by mouth every 6 hours, Disp: , Rfl:      albuterol (2.5 MG/3ML) 0.083% neb solution, Take 2.5 mg by nebulization every 6 hours as needed, Disp: , Rfl:      albuterol (PROAIR HFA/PROVENTIL HFA/VENTOLIN HFA) 108 (90 Base) MCG/ACT inhaler, Inhale 2 puffs into the lungs 2 times daily And q4h PRN, Disp: , Rfl:      alum & mag hydroxide-simethicone (MAALOX) 200-200-20 MG/5ML SUSP suspension, Take 30 mLs by mouth every 4 hours as needed for indigestion, Disp: , Rfl:      bisacodyl (DULCOLAX) 5 MG EC tablet, Take 5 mg by mouth daily as needed for constipation, Disp: , Rfl:      calcium polycarbophil (FIBERCON) 625 MG tablet, Take 1 tablet by mouth daily, Disp: , Rfl:      celecoxib (CELEBREX) 200 MG capsule, Take 200 mg by mouth daily, Disp: , Rfl:      cetirizine (ZYRTEC) 10 MG tablet, Take 10 mg by mouth daily, Disp: , Rfl:      cyanocobalamin (VITAMIN B-12) 2500 MCG SUBL sublingual tablet, Place 2,500 mcg under the tongue daily, Disp: , Rfl:      glipiZIDE (GLUCOTROL) 10 MG tablet, Take 1 tablet (10 mg) by mouth every morning (before breakfast), Disp: 30 tablet, Rfl: 0     glipiZIDE (GLUCOTROL) 5 MG tablet, Take 3 tablets (15 mg) by mouth every evening, Disp: 30 tablet, Rfl: 0     guaiFENesin (MUCINEX) 600 MG 12 hr tablet, Take 600 mg by mouth 2 times daily, Disp: , Rfl:      insulin glargine (LANTUS PEN) 100 UNIT/ML pen, Inject 30 Units Subcutaneous At Bedtime, Disp: , Rfl:      ipratropium (ATROVENT) 0.06 % nasal spray, Spray 1 spray into both nostrils 4 times daily as needed for rhinitis, Disp: , Rfl:      ketoconazole (NIZORAL) 2 % external shampoo, Apply topically twice a week Mon and Fri., Disp: , Rfl:      liraglutide (VICTOZA) 18 MG/3ML solution, Inject 0.6 mg Subcutaneous daily, Disp: , Rfl:      mineral  oil-hydrophilic petrolatum (AQUAPHOR) external ointment, Apply topically 2 times daily, Disp: , Rfl:      nitroGLYcerin (NITROSTAT) 0.4 MG sublingual tablet, Place 0.4 mg under the tongue every 5 minutes as needed, Disp: , Rfl:      omeprazole (PRILOSEC) 20 MG CR capsule, Take 20 mg by mouth daily, Disp: , Rfl:      polyethylene glycol (MIRALAX) 17 g packet, Take 1 packet by mouth daily as needed for constipation, Disp: , Rfl:      rivaroxaban ANTICOAGULANT (XARELTO) 15 MG TABS tablet, Take by mouth daily (with dinner), Disp: , Rfl:      senna-docusate (SENOKOT-S/PERICOLACE) 8.6-50 MG tablet, Take 1 tablet by mouth 2 times daily And BID PRN, Disp: , Rfl:      simvastatin (ZOCOR) 40 MG tablet, Take 40 mg by mouth At Bedtime, Disp: , Rfl:      spironolactone (ALDACTONE) 25 MG tablet, Take 25 mg by mouth daily, Disp: , Rfl:      triamcinolone (KENALOG) 0.1 % cream, Apply 1 Application topically daily, Disp: , Rfl:      urea (CARMOL) 10 % cream, Apply 1 Application topically daily as needed, Disp: , Rfl:     BP (!) 143/59   Pulse 63   Temp 97.8  F (36.6  C)   Resp 18   Wt 88 kg (194 lb 1.6 oz)   SpO2 96%   BMI 26.32 kg/m      LABS:   Lab Results   Component Value Date    A1C 6.3 10/21/2021    A1C 6.9 03/09/2021    A1C 7.6 07/28/2020    A1C 7.2 03/10/2020    A1C 7.4 07/25/2019    A1C 7.7 06/26/2018    A1C 7.3 04/20/2018           ASSESSMENT:    Encounter Diagnoses   Name Primary?     Type 2 diabetes mellitus with other circulatory complication, with long-term current use of insulin (H) Yes     Closed nondisplaced intertrochanteric fracture of left femur with routine healing, subsequent encounter      Status post amputation of left foot (H)      Essential hypertension        PLAN:    Decreasing his Lantus 30 units at at bedtime rather than 35 units continue the Glucotrol and Victoza.  Due to nonuse discontinue the oxycodone as needed.  Had the conversation today again regarding his rehabilitation, his progress that  he has made, nutrition, his medication management as well as the potential to go home soon within the near future.  His Sister Miriam was unavailable to discuss any of these concerns.    For documentation purposes total visit 35 minutes to which over 50% was spent with the patient going over his left thigh, abductor muscles, his surgery, his progress, diabetes, hypertension, nutrition, history of falls especially with the left forefoot amputation, as well as a potential for discharge soon to home.        Electronically signed by: Wagner Mancuso NP

## 2022-06-15 ENCOUNTER — TRANSITIONAL CARE UNIT VISIT (OUTPATIENT)
Dept: GERIATRICS | Facility: CLINIC | Age: 77
End: 2022-06-15
Payer: MEDICARE

## 2022-06-15 VITALS
TEMPERATURE: 100.4 F | DIASTOLIC BLOOD PRESSURE: 63 MMHG | BODY MASS INDEX: 26.98 KG/M2 | RESPIRATION RATE: 18 BRPM | WEIGHT: 198.9 LBS | HEART RATE: 62 BPM | SYSTOLIC BLOOD PRESSURE: 138 MMHG | OXYGEN SATURATION: 95 %

## 2022-06-15 DIAGNOSIS — Z79.4 TYPE 2 DIABETES MELLITUS WITH OTHER CIRCULATORY COMPLICATION, WITH LONG-TERM CURRENT USE OF INSULIN (H): ICD-10-CM

## 2022-06-15 DIAGNOSIS — I10 ESSENTIAL HYPERTENSION: Chronic | ICD-10-CM

## 2022-06-15 DIAGNOSIS — G63 POLYNEUROPATHY ASSOCIATED WITH UNDERLYING DISEASE (H): ICD-10-CM

## 2022-06-15 DIAGNOSIS — E11.59 TYPE 2 DIABETES MELLITUS WITH OTHER CIRCULATORY COMPLICATION, WITH LONG-TERM CURRENT USE OF INSULIN (H): ICD-10-CM

## 2022-06-15 DIAGNOSIS — S72.145D CLOSED NONDISPLACED INTERTROCHANTERIC FRACTURE OF LEFT FEMUR WITH ROUTINE HEALING, SUBSEQUENT ENCOUNTER: Primary | ICD-10-CM

## 2022-06-15 PROCEDURE — 99316 NF DSCHRG MGMT 30 MIN+: CPT | Performed by: FAMILY MEDICINE

## 2022-06-15 NOTE — LETTER
6/15/2022        RE: Ever Crane  [address]  [facility] [address]        [facility] GERIATRIC SERVICES    Facility:   [facility] ([facility]) [[id number]]   Code Status: FULL CODE      CHIEF COMPLAINT/REASON FOR VISIT:  Chief Complaint   Patient presents with     Discharge Summary Nursing Home       HISTORY:      HPI: Ever is a 77 year old male who was hospitalized May 11, 2022 through May 17, 2022 secondary to evaluation after a fall.  He was walking down the hallway with his walker and tried to go about 20 feet without his walker to the Pap machine at his assisted living facility.  Apparently his right foot caught on his left foot and he fell.  No loss of consciousness.  He is on Xarelto.  His work-up did include a sodium 140, potassium 4.2, BUN 15, creatinine 1.46, white cells 7.7, hemoglobin 12.6 and platelets 164.  The CT of the left hip did show an intertrochanteric fracture.  Chest x-ray was unremarkable.  He did undergo internal fixation.  He also has a history of ischemic cardiomyopathy, CAD status post CABG in 2018 and 2000 along with a history of chronic diastolic heart failure, diabetes, peripheral neuropathy, PAD, hyperlipidemia.  He does have a history of persistent A. fib he does use Xarelto.  He is also on Xarelto for PAD.  He does have asymptomatic severe right internal carotid artery stenosis and back in 2018 the ultrasound did show on the right internal carotid artery 70-99% stenosis.  He has been on the transitional care unit and up to this point has had success with the rehabilitation department.  He has achieved therapy recommendations to which now he is independent and using his walker.  He does have a history of left forefoot amputation as well.  He is taking the rehabilitation appropriately and has been able to show safety and success with his ambulation.  During his stay the left hip looked good he did have a chance to visit with orthopedics on May 24.  As well as on Isabel 3.  The  sutures were removed.  X-rays were reperformed.  His appetite is good.  Denies any bowel or bladder problems.  His blood sugars in the morning actually have been dropping most recently running anywhere from  but most of them less than 120 and the Lantus was recently decreased to 30 units at at bedtime now will decrease it to 26 units at at bedtime continue with the Victoza and Glucotrol.  His A1c in May was 6.3.  He will need follow-up and monitoring perhaps even diabetic education.  His weight 198 pounds in comparison to June 11 199 pounds.  No increased use of inhalers.  No respiratory problems or complaints.  For pain he is on scheduled Tylenol not using the oxycodone which has been discontinued.  No Voltaren gel.  See results below his most recent laboratory studies including a BMP and CBC on May 24.  Past Medical History:   Diagnosis Date     A-fib (H)      MESHA (acute kidney injury) (H)      Atopic keratoconjunctivitis      Atrial fibrillation (H)     Brian Moe: 8/2011 Cardioversion; CHADS2 VASC = 5; he is on warfarin and sotalol      Atrial flutter (H)      Mcgarry's esophagus 1/1/2012    per note of Dr. Tavo Mike of [facility]     Bilateral lower leg cellulitis 8/31/2018     Candidiasis of perineum 1/3/2018     Carotid stenosis, asymptomatic, right      CHF (congestive heart failure) (H)      Cholecystitis, acute 8/18/2019     Coronary artery disease due to lipid rich plaque 2000    CABG x2     Diabetes (H)      Diabetic ulcer of both feet (H) 10/31/2017     Dyslexia      Dyslipidemia, goal LDL below 70 2000     Epistaxis      Essential hypertension      Gangrene of left foot (H)      GERD (gastroesophageal reflux disease)      HLD (hyperlipidemia)      HTN (hypertension)      Hyponatremia      MRSA (methicillin resistant Staphylococcus aureus)      Neuropathy      Non-STEMI (non-ST elevated myocardial infarction) (H)      Other atopic dermatitis      Peripheral vascular disease (H)      Pneumonia  "2018     Type 2 diabetes mellitus, without long-term current use of insulin (H)      Unable to function independently 2017            Family History   Problem Relation Age of Onset     Sudden Death Mother 85.00     CABG Father      Valvular heart disease Father      Esophageal Cancer Father 58.00        cause of death     No Known Problems Son      No Known Problems Sister      Obesity Sister      Osteoarthritis Sister      No Known Problems Sister      No Known Problems Grandchild      No Known Problems Grandchild       Social History     Socioeconomic History     Marital status:      Number of children: 1   Tobacco Use     Smoking status: Former Smoker     Packs/day: 1.00     Years: 36.00     Pack years: 36.00     Types: Cigarettes, Cigarettes     Start date: 1964     Quit date: 2000     Years since quittin.1     Smokeless tobacco: Never Used   Substance and Sexual Activity     Alcohol use: Yes     Alcohol/week: 5.0 standard drinks     Types: 1 Cans of beer per week     Drug use: No     Sexual activity: Not Currently     Partners: Female   Social History Narrative    Ever lived with his son Raciel in 2017, but then he went to [facility] after foot amputation.  Ever states he will move to a facility in [place] in . 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  Our reception staff at [facility] in [place] confirmed on 2018 that Ever brought himself to the campus and did not utilize a family member or ride service.  I  would also note that he states his granddaughter is name Claire and not Leonela, so I corrected that in the chart (ROSALIA Moe MD). He lives in [facility] in  and is now not driving as he states \"I got a ticket\".           REVIEW OF SYSTEM:  He currently denies any new symptoms of cough or " cold flus, postnasal drip, wheezing chest pain dizziness vertigo fevers chills nausea vomiting flulike symptoms constipation diarrhea dysuria frequency or urgency.    PHYSICAL EXAM:   Pleasant gentleman in no acute distress.  Head is normocephalic.    Lung sounds are clear throughout.  Cardiovascular irregularity, history of PVD.  Gastrointestinal does have previous abdominal scars.  Nontender nondistended.  Musculoskeletal good CMS to his left leg.  Left forefoot amputation. Pain is managed.  Able to ambulate independently.  Psychiatric: Pleasant affect.    Current Outpatient Medications:      acetaminophen (TYLENOL) 325 MG tablet, Take 650 mg by mouth every 6 hours, Disp: , Rfl:      albuterol (2.5 MG/3ML) 0.083% neb solution, Take 2.5 mg by nebulization every 6 hours as needed, Disp: , Rfl:      albuterol (PROAIR HFA/PROVENTIL HFA/VENTOLIN HFA) 108 (90 Base) MCG/ACT inhaler, Inhale 2 puffs into the lungs 2 times daily And q4h PRN, Disp: , Rfl:      alum & mag hydroxide-simethicone (MAALOX) 200-200-20 MG/5ML SUSP suspension, Take 30 mLs by mouth every 4 hours as needed for indigestion, Disp: , Rfl:      bisacodyl (DULCOLAX) 5 MG EC tablet, Take 5 mg by mouth daily as needed for constipation, Disp: , Rfl:      calcium polycarbophil (FIBERCON) 625 MG tablet, Take 1 tablet by mouth daily, Disp: , Rfl:      celecoxib (CELEBREX) 200 MG capsule, Take 200 mg by mouth daily, Disp: , Rfl:      cetirizine (ZYRTEC) 10 MG tablet, Take 10 mg by mouth daily, Disp: , Rfl:      cyanocobalamin (VITAMIN B-12) 2500 MCG SUBL sublingual tablet, Place 2,500 mcg under the tongue daily, Disp: , Rfl:      glipiZIDE (GLUCOTROL) 10 MG tablet, Take 1 tablet (10 mg) by mouth every morning (before breakfast), Disp: 30 tablet, Rfl: 0     glipiZIDE (GLUCOTROL) 5 MG tablet, Take 3 tablets (15 mg) by mouth every evening, Disp: 30 tablet, Rfl: 0     guaiFENesin (MUCINEX) 600 MG 12 hr tablet, Take 600 mg by mouth 2 times daily, Disp: , Rfl:       insulin glargine (LANTUS PEN) 100 UNIT/ML pen, Inject 26 Units Subcutaneous At Bedtime, Disp: , Rfl:      ipratropium (ATROVENT) 0.06 % nasal spray, Spray 1 spray into both nostrils 4 times daily as needed for rhinitis, Disp: , Rfl:      ketoconazole (NIZORAL) 2 % external shampoo, Apply topically twice a week Mon and Fri., Disp: , Rfl:      liraglutide (VICTOZA) 18 MG/3ML solution, Inject 0.6 mg Subcutaneous daily, Disp: , Rfl:      mineral oil-hydrophilic petrolatum (AQUAPHOR) external ointment, Apply topically 2 times daily, Disp: , Rfl:      nitroGLYcerin (NITROSTAT) 0.4 MG sublingual tablet, Place 0.4 mg under the tongue every 5 minutes as needed, Disp: , Rfl:      omeprazole (PRILOSEC) 20 MG CR capsule, Take 20 mg by mouth daily, Disp: , Rfl:      polyethylene glycol (MIRALAX) 17 g packet, Take 1 packet by mouth daily as needed for constipation, Disp: , Rfl:      rivaroxaban ANTICOAGULANT (XARELTO) 15 MG TABS tablet, Take by mouth daily (with dinner), Disp: , Rfl:      senna-docusate (SENOKOT-S/PERICOLACE) 8.6-50 MG tablet, Take 1 tablet by mouth 2 times daily And BID PRN, Disp: , Rfl:      simvastatin (ZOCOR) 40 MG tablet, Take 40 mg by mouth At Bedtime, Disp: , Rfl:      spironolactone (ALDACTONE) 25 MG tablet, Take 25 mg by mouth daily, Disp: , Rfl:      triamcinolone (KENALOG) 0.1 % cream, Apply 1 Application topically daily, Disp: , Rfl:      urea (CARMOL) 10 % cream, Apply 1 Application topically daily as needed, Disp: , Rfl:     /63   Pulse 62   Temp 100.4  F (38  C)   Resp 18   Wt 90.2 kg (198 lb 14.4 oz)   SpO2 95%   BMI 26.98 kg/m        LABS:   Last Comprehensive Metabolic Panel:  Sodium   Date Value Ref Range Status   05/24/2022 140 136 - 145 mmol/L Final   06/30/2018 139 133 - 144 mmol/L Final     Potassium   Date Value Ref Range Status   05/24/2022 4.3 3.5 - 5.0 mmol/L Final   06/30/2018 4.1 3.4 - 5.3 mmol/L Final     Chloride   Date Value Ref Range Status   05/24/2022 111 (H) 98 -  107 mmol/L Final   06/30/2018 108 94 - 109 mmol/L Final     Carbon Dioxide   Date Value Ref Range Status   06/30/2018 20 20 - 32 mmol/L Final     Carbon Dioxide (CO2)   Date Value Ref Range Status   05/24/2022 20 (L) 22 - 31 mmol/L Final     Anion Gap   Date Value Ref Range Status   05/24/2022 9 5 - 18 mmol/L Final   06/30/2018 11 3 - 14 mmol/L Final     Glucose   Date Value Ref Range Status   05/24/2022 173 (H) 70 - 125 mg/dL Final   06/30/2018 162 (H) 70 - 99 mg/dL Final     Urea Nitrogen   Date Value Ref Range Status   05/24/2022 21 8 - 28 mg/dL Final   06/30/2018 22 7 - 30 mg/dL Final     Creatinine   Date Value Ref Range Status   05/24/2022 1.22 0.70 - 1.30 mg/dL Final   06/30/2018 1.20 0.66 - 1.25 mg/dL Final     GFR Estimate   Date Value Ref Range Status   05/24/2022 61 >60 mL/min/1.73m2 Final     Comment:     Effective December 21, 2021 eGFRcr in adults is calculated using the 2021 CKD-EPI creatinine equation which includes age and gender (Denise et al., NEJ, DOI: 10.1056/VVQPjy5806395)   03/09/2021 46 (L) >60 mL/min/1.73m2 Final   06/30/2018 59 (L) >60 mL/min/1.7m2 Final     Comment:     Non  GFR Calc     Calcium   Date Value Ref Range Status   05/24/2022 9.3 8.5 - 10.5 mg/dL Final   06/30/2018 9.0 8.5 - 10.1 mg/dL Final     CBC RESULTS: Recent Labs   Lab Test 05/24/22  0752   WBC 8.9   RBC 3.99*   HGB 11.5*   HCT 36.4*   MCV 91   MCH 28.8   MCHC 31.6   RDW 13.3        Lab Results   Component Value Date    A1C 6.3 10/21/2021    A1C 6.9 03/09/2021    A1C 7.6 07/28/2020    A1C 7.2 03/10/2020    A1C 7.4 07/25/2019    A1C 7.7 06/26/2018    A1C 7.3 04/20/2018         ASSESSMENT:    Encounter Diagnoses   Name Primary?     Closed nondisplaced intertrochanteric fracture of left femur with routine healing, subsequent encounter Yes     Essential hypertension      Type 2 diabetes mellitus with other circulatory complication, with long-term current use of insulin (H)      Polyneuropathy  associated with underlying disease (H)        MEDICAL EQUIPMENT NEEDS:  None    DISCHARGE PLAN/FACE TO FACE:  I certify that services are/were furnished while this patient was under the care of a physician and that a physician or an allowed non-physician practitioner (NPP), had a face-to-face encounter that meets the physician face-to-face encounter requirements. The encounter was in whole, or in part, related to the primary reason for home health. The patient is confined to his/her home and needs intermittent skilled nursing, physical therapy, speech-language pathology, or the continued need for occupational therapy. A plan of care has been established by a physician and is periodically reviewed by a physician.  Date of Face-to-Face Encounter: Isabel 15, 2022    I certify that, based on my findings, the following services are medically necessary home health services: He will be discharging to home or [facility] living facility with current medications along with approximated date of June 17, 2022 as well as receive physical and occupational therapy home health aide and nursing    My clinical findings support the need for the above skilled services because: (Please write a brief narrative summary that describes what the RN, PT, SLP, or other services will be doing in the home. A list of diagnoses in this section does not meet the CMS requirements.)  Secondary to his chronic medical conditions including his recent hospitalization but also continuation of the rehabilitation process and home safety along with nursing for medication management including diabetes    This patient is homebound because: (Please write a brief narrative summary describing the functional limitations as to why this patient is homebound and specifically what makes this patient homebound.)  Secondary to self-care deficits as well as his recent hospitalization and his stay on the transitional care unit as well as continuation of the  rehabilitation process including home safety and then nursing for medication management including diabetic care.    The patient is, or has been, under my care and I have initiated the establishment of the plan of care. This patient will be followed by a physician who will periodically review the plan of care.    Schedule follow up visit with primary care provider within 7 days to reestablish care.  He will follow-up with orthopedics as previously arranged.  His last follow-up was Isabel 3.  He will also follow-up with his primary care doctor regarding medication management and any future laboratory studies.  He may want a follow-up with diabetic education to go over his insulin including his Glucotrol, Victoza and Lantus and make further adjustments to his medication.  He is not had any respiratory complaints.  He did not have any other questions and did feel that the rehabilitation was a positive structure that he needed.    Discharge coordination of care greater than 30 minutes    Electronically signed by: Wagner Mancuso NP          Sincerely,        Wagner Mancuso NP

## 2022-06-15 NOTE — PROGRESS NOTES
[facility] GERIATRIC SERVICES    Facility:   [facility] ([facility]) [[id number]]   Code Status: FULL CODE      CHIEF COMPLAINT/REASON FOR VISIT:  Chief Complaint   Patient presents with     Discharge Summary Nursing Home       HISTORY:      HPI: Ever is a 77 year old male who was hospitalized May 11, 2022 through May 17, 2022 secondary to evaluation after a fall.  He was walking down the hallway with his walker and tried to go about 20 feet without his walker to the Pap machine at his assisted living facility.  Apparently his right foot caught on his left foot and he fell.  No loss of consciousness.  He is on Xarelto.  His work-up did include a sodium 140, potassium 4.2, BUN 15, creatinine 1.46, white cells 7.7, hemoglobin 12.6 and platelets 164.  The CT of the left hip did show an intertrochanteric fracture.  Chest x-ray was unremarkable.  He did undergo internal fixation.  He also has a history of ischemic cardiomyopathy, CAD status post CABG in 2018 and 2000 along with a history of chronic diastolic heart failure, diabetes, peripheral neuropathy, PAD, hyperlipidemia.  He does have a history of persistent A. fib he does use Xarelto.  He is also on Xarelto for PAD.  He does have asymptomatic severe right internal carotid artery stenosis and back in 2018 the ultrasound did show on the right internal carotid artery 70-99% stenosis.  He has been on the transitional care unit and up to this point has had success with the rehabilitation department.  He has achieved therapy recommendations to which now he is independent and using his walker.  He does have a history of left forefoot amputation as well.  He is taking the rehabilitation appropriately and has been able to show safety and success with his ambulation.  During his stay the left hip looked good he did have a chance to visit with orthopedics on May 24.  As well as on Isabel 3.  The sutures were removed.  X-rays were reperformed.  His appetite is good.  Denies any  bowel or bladder problems.  His blood sugars in the morning actually have been dropping most recently running anywhere from  but most of them less than 120 and the Lantus was recently decreased to 30 units at at bedtime now will decrease it to 26 units at at bedtime continue with the Victoza and Glucotrol.  His A1c in May was 6.3.  He will need follow-up and monitoring perhaps even diabetic education.  His weight 198 pounds in comparison to June 11 199 pounds.  No increased use of inhalers.  No respiratory problems or complaints.  For pain he is on scheduled Tylenol not using the oxycodone which has been discontinued.  No Voltaren gel.  See results below his most recent laboratory studies including a BMP and CBC on May 24.  Past Medical History:   Diagnosis Date     A-fib (H)      MESHA (acute kidney injury) (H)      Atopic keratoconjunctivitis      Atrial fibrillation (H)     Moe Brian: 8/2011 Cardioversion; CHADS2 VASC = 5; he is on warfarin and sotalol      Atrial flutter (H)      Mcgarry's esophagus 1/1/2012    per note of Dr. Tavo Mike of [facility]     Bilateral lower leg cellulitis 8/31/2018     Candidiasis of perineum 1/3/2018     Carotid stenosis, asymptomatic, right      CHF (congestive heart failure) (H)      Cholecystitis, acute 8/18/2019     Coronary artery disease due to lipid rich plaque 2000    CABG x2     Diabetes (H)      Diabetic ulcer of both feet (H) 10/31/2017     Dyslexia      Dyslipidemia, goal LDL below 70 2000     Epistaxis      Essential hypertension      Gangrene of left foot (H)      GERD (gastroesophageal reflux disease)      HLD (hyperlipidemia)      HTN (hypertension)      Hyponatremia      MRSA (methicillin resistant Staphylococcus aureus)      Neuropathy      Non-STEMI (non-ST elevated myocardial infarction) (H)      Other atopic dermatitis      Peripheral vascular disease (H)      Pneumonia 6/26/2018     Type 2 diabetes mellitus, without long-term current use of insulin (H)   "    Unable to function independently 2017            Family History   Problem Relation Age of Onset     Sudden Death Mother 85.00     CABG Father      Valvular heart disease Father      Esophageal Cancer Father 58.00        cause of death     No Known Problems Son      No Known Problems Sister      Obesity Sister      Osteoarthritis Sister      No Known Problems Sister      No Known Problems Grandchild      No Known Problems Grandchild       Social History     Socioeconomic History     Marital status:      Number of children: 1   Tobacco Use     Smoking status: Former Smoker     Packs/day: 1.00     Years: 36.00     Pack years: 36.00     Types: Cigarettes, Cigarettes     Start date: 1964     Quit date: 2000     Years since quittin.1     Smokeless tobacco: Never Used   Substance and Sexual Activity     Alcohol use: Yes     Alcohol/week: 5.0 standard drinks     Types: 1 Cans of beer per week     Drug use: No     Sexual activity: Not Currently     Partners: Female   Social History Narrative    Ever lived with his son Raciel in 2017, but then he went to [facility] after foot amputation.  Ever states he will move to a facility in [place] in . 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  Our reception staff at [facility] in [place] confirmed on 2018 that Ever brought himself to the campus and did not utilize a family member or ride service.  I  would also note that he states his granddaughter is name Claire and not Leonela, so I corrected that in the chart (ROSALIA Moe MD). He lives in [facility] in  and is now not driving as he states \"I got a ticket\".           REVIEW OF SYSTEM:  He currently denies any new symptoms of cough or cold flus, postnasal drip, wheezing chest pain dizziness vertigo fevers chills nausea " vomiting flulike symptoms constipation diarrhea dysuria frequency or urgency.    PHYSICAL EXAM:   Pleasant gentleman in no acute distress.  Head is normocephalic.    Lung sounds are clear throughout.  Cardiovascular irregularity, history of PVD.  Gastrointestinal does have previous abdominal scars.  Nontender nondistended.  Musculoskeletal good CMS to his left leg.  Left forefoot amputation. Pain is managed.  Able to ambulate independently.  Psychiatric: Pleasant affect.    Current Outpatient Medications:      acetaminophen (TYLENOL) 325 MG tablet, Take 650 mg by mouth every 6 hours, Disp: , Rfl:      albuterol (2.5 MG/3ML) 0.083% neb solution, Take 2.5 mg by nebulization every 6 hours as needed, Disp: , Rfl:      albuterol (PROAIR HFA/PROVENTIL HFA/VENTOLIN HFA) 108 (90 Base) MCG/ACT inhaler, Inhale 2 puffs into the lungs 2 times daily And q4h PRN, Disp: , Rfl:      alum & mag hydroxide-simethicone (MAALOX) 200-200-20 MG/5ML SUSP suspension, Take 30 mLs by mouth every 4 hours as needed for indigestion, Disp: , Rfl:      bisacodyl (DULCOLAX) 5 MG EC tablet, Take 5 mg by mouth daily as needed for constipation, Disp: , Rfl:      calcium polycarbophil (FIBERCON) 625 MG tablet, Take 1 tablet by mouth daily, Disp: , Rfl:      celecoxib (CELEBREX) 200 MG capsule, Take 200 mg by mouth daily, Disp: , Rfl:      cetirizine (ZYRTEC) 10 MG tablet, Take 10 mg by mouth daily, Disp: , Rfl:      cyanocobalamin (VITAMIN B-12) 2500 MCG SUBL sublingual tablet, Place 2,500 mcg under the tongue daily, Disp: , Rfl:      glipiZIDE (GLUCOTROL) 10 MG tablet, Take 1 tablet (10 mg) by mouth every morning (before breakfast), Disp: 30 tablet, Rfl: 0     glipiZIDE (GLUCOTROL) 5 MG tablet, Take 3 tablets (15 mg) by mouth every evening, Disp: 30 tablet, Rfl: 0     guaiFENesin (MUCINEX) 600 MG 12 hr tablet, Take 600 mg by mouth 2 times daily, Disp: , Rfl:      insulin glargine (LANTUS PEN) 100 UNIT/ML pen, Inject 26 Units Subcutaneous At Bedtime,  Disp: , Rfl:      ipratropium (ATROVENT) 0.06 % nasal spray, Spray 1 spray into both nostrils 4 times daily as needed for rhinitis, Disp: , Rfl:      ketoconazole (NIZORAL) 2 % external shampoo, Apply topically twice a week Mon and Fri., Disp: , Rfl:      liraglutide (VICTOZA) 18 MG/3ML solution, Inject 0.6 mg Subcutaneous daily, Disp: , Rfl:      mineral oil-hydrophilic petrolatum (AQUAPHOR) external ointment, Apply topically 2 times daily, Disp: , Rfl:      nitroGLYcerin (NITROSTAT) 0.4 MG sublingual tablet, Place 0.4 mg under the tongue every 5 minutes as needed, Disp: , Rfl:      omeprazole (PRILOSEC) 20 MG CR capsule, Take 20 mg by mouth daily, Disp: , Rfl:      polyethylene glycol (MIRALAX) 17 g packet, Take 1 packet by mouth daily as needed for constipation, Disp: , Rfl:      rivaroxaban ANTICOAGULANT (XARELTO) 15 MG TABS tablet, Take by mouth daily (with dinner), Disp: , Rfl:      senna-docusate (SENOKOT-S/PERICOLACE) 8.6-50 MG tablet, Take 1 tablet by mouth 2 times daily And BID PRN, Disp: , Rfl:      simvastatin (ZOCOR) 40 MG tablet, Take 40 mg by mouth At Bedtime, Disp: , Rfl:      spironolactone (ALDACTONE) 25 MG tablet, Take 25 mg by mouth daily, Disp: , Rfl:      triamcinolone (KENALOG) 0.1 % cream, Apply 1 Application topically daily, Disp: , Rfl:      urea (CARMOL) 10 % cream, Apply 1 Application topically daily as needed, Disp: , Rfl:     /63   Pulse 62   Temp 100.4  F (38  C)   Resp 18   Wt 90.2 kg (198 lb 14.4 oz)   SpO2 95%   BMI 26.98 kg/m        LABS:   Last Comprehensive Metabolic Panel:  Sodium   Date Value Ref Range Status   05/24/2022 140 136 - 145 mmol/L Final   06/30/2018 139 133 - 144 mmol/L Final     Potassium   Date Value Ref Range Status   05/24/2022 4.3 3.5 - 5.0 mmol/L Final   06/30/2018 4.1 3.4 - 5.3 mmol/L Final     Chloride   Date Value Ref Range Status   05/24/2022 111 (H) 98 - 107 mmol/L Final   06/30/2018 108 94 - 109 mmol/L Final     Carbon Dioxide   Date Value  Ref Range Status   06/30/2018 20 20 - 32 mmol/L Final     Carbon Dioxide (CO2)   Date Value Ref Range Status   05/24/2022 20 (L) 22 - 31 mmol/L Final     Anion Gap   Date Value Ref Range Status   05/24/2022 9 5 - 18 mmol/L Final   06/30/2018 11 3 - 14 mmol/L Final     Glucose   Date Value Ref Range Status   05/24/2022 173 (H) 70 - 125 mg/dL Final   06/30/2018 162 (H) 70 - 99 mg/dL Final     Urea Nitrogen   Date Value Ref Range Status   05/24/2022 21 8 - 28 mg/dL Final   06/30/2018 22 7 - 30 mg/dL Final     Creatinine   Date Value Ref Range Status   05/24/2022 1.22 0.70 - 1.30 mg/dL Final   06/30/2018 1.20 0.66 - 1.25 mg/dL Final     GFR Estimate   Date Value Ref Range Status   05/24/2022 61 >60 mL/min/1.73m2 Final     Comment:     Effective December 21, 2021 eGFRcr in adults is calculated using the 2021 CKD-EPI creatinine equation which includes age and gender (Denise et al., NEJM, DOI: 10.1056/WDEPlv0535775)   03/09/2021 46 (L) >60 mL/min/1.73m2 Final   06/30/2018 59 (L) >60 mL/min/1.7m2 Final     Comment:     Non  GFR Calc     Calcium   Date Value Ref Range Status   05/24/2022 9.3 8.5 - 10.5 mg/dL Final   06/30/2018 9.0 8.5 - 10.1 mg/dL Final     CBC RESULTS: Recent Labs   Lab Test 05/24/22  0752   WBC 8.9   RBC 3.99*   HGB 11.5*   HCT 36.4*   MCV 91   MCH 28.8   MCHC 31.6   RDW 13.3        Lab Results   Component Value Date    A1C 6.3 10/21/2021    A1C 6.9 03/09/2021    A1C 7.6 07/28/2020    A1C 7.2 03/10/2020    A1C 7.4 07/25/2019    A1C 7.7 06/26/2018    A1C 7.3 04/20/2018         ASSESSMENT:    Encounter Diagnoses   Name Primary?     Closed nondisplaced intertrochanteric fracture of left femur with routine healing, subsequent encounter Yes     Essential hypertension      Type 2 diabetes mellitus with other circulatory complication, with long-term current use of insulin (H)      Polyneuropathy associated with underlying disease (H)        MEDICAL EQUIPMENT NEEDS:  None    DISCHARGE  PLAN/FACE TO FACE:  I certify that services are/were furnished while this patient was under the care of a physician and that a physician or an allowed non-physician practitioner (NPP), had a face-to-face encounter that meets the physician face-to-face encounter requirements. The encounter was in whole, or in part, related to the primary reason for home health. The patient is confined to his/her home and needs intermittent skilled nursing, physical therapy, speech-language pathology, or the continued need for occupational therapy. A plan of care has been established by a physician and is periodically reviewed by a physician.  Date of Face-to-Face Encounter: Isabel 15, 2022    I certify that, based on my findings, the following services are medically necessary home health services: He will be discharging to home or [place] assisted living [facility] with current medications along with approximated date of June 17, 2022 as well as receive physical and occupational therapy home health aide and nursing    My clinical findings support the need for the above skilled services because: (Please write a brief narrative summary that describes what the RN, PT, SLP, or other services will be doing in the home. A list of diagnoses in this section does not meet the CMS requirements.)  Secondary to his chronic medical conditions including his recent hospitalization but also continuation of the rehabilitation process and home safety along with nursing for medication management including diabetes    This patient is homebound because: (Please write a brief narrative summary describing the functional limitations as to why this patient is homebound and specifically what makes this patient homebound.)  Secondary to self-care deficits as well as his recent hospitalization and his stay on the transitional care unit as well as continuation of the rehabilitation process including home safety and then nursing for medication management including  diabetic care.    The patient is, or has been, under my care and I have initiated the establishment of the plan of care. This patient will be followed by a physician who will periodically review the plan of care.    Schedule follow up visit with primary care provider within 7 days to reestablish care.  He will follow-up with orthopedics as previously arranged.  His last follow-up was Isabel 3.  He will also follow-up with his primary care doctor regarding medication management and any future laboratory studies.  He may want a follow-up with diabetic education to go over his insulin including his Glucotrol, Victoza and Lantus and make further adjustments to his medication.  He is not had any respiratory complaints.  He did not have any other questions and did feel that the rehabilitation was a positive structure that he needed.    Discharge coordination of care greater than 30 minutes    Electronically signed by: Wagner Mancuso NP

## 2022-06-21 VITALS
SYSTOLIC BLOOD PRESSURE: 142 MMHG | BODY MASS INDEX: 26.85 KG/M2 | TEMPERATURE: 98.1 F | RESPIRATION RATE: 18 BRPM | DIASTOLIC BLOOD PRESSURE: 70 MMHG | OXYGEN SATURATION: 97 % | WEIGHT: 198 LBS | HEART RATE: 61 BPM

## 2022-06-21 PROBLEM — N17.9 AKI (ACUTE KIDNEY INJURY) (H): Status: ACTIVE | Noted: 2022-06-21

## 2022-06-21 PROBLEM — N18.32 STAGE 3B CHRONIC KIDNEY DISEASE (H): Status: ACTIVE | Noted: 2020-11-12

## 2022-06-22 ENCOUNTER — TRANSITIONAL CARE UNIT VISIT (OUTPATIENT)
Dept: GERIATRICS | Facility: CLINIC | Age: 77
End: 2022-06-22
Payer: MEDICARE

## 2022-06-22 DIAGNOSIS — S72.145D CLOSED NONDISPLACED INTERTROCHANTERIC FRACTURE OF LEFT FEMUR WITH ROUTINE HEALING, SUBSEQUENT ENCOUNTER: ICD-10-CM

## 2022-06-22 DIAGNOSIS — I10 ESSENTIAL HYPERTENSION: Chronic | ICD-10-CM

## 2022-06-22 DIAGNOSIS — I50.30 HEART FAILURE WITH PRESERVED EJECTION FRACTION, NYHA CLASS II (H): ICD-10-CM

## 2022-06-22 DIAGNOSIS — E11.59 TYPE 2 DIABETES MELLITUS WITH OTHER CIRCULATORY COMPLICATION, WITH LONG-TERM CURRENT USE OF INSULIN (H): Primary | ICD-10-CM

## 2022-06-22 DIAGNOSIS — Z79.4 TYPE 2 DIABETES MELLITUS WITH OTHER CIRCULATORY COMPLICATION, WITH LONG-TERM CURRENT USE OF INSULIN (H): Primary | ICD-10-CM

## 2022-06-22 PROCEDURE — 99316 NF DSCHRG MGMT 30 MIN+: CPT | Performed by: FAMILY MEDICINE

## 2022-06-22 NOTE — LETTER
6/22/2022        RE: Ever Crane  [address]  [facility] [address]        [facility] GERIATRIC SERVICES    Facility:   [facility] ([facility]) [[id number]]   Code Status: FULL CODE      CHIEF COMPLAINT/REASON FOR VISIT:  Chief Complaint   Patient presents with     Discharge Summary Nursing Home       HISTORY:      HPI: Ever is a 77 year old male who was hospitalized May 11, 2022 through May 17, 2022 secondary to evaluation after a fall.  He was walking down the hallway with his walker and tried to go about 20 feet without his walker to the Pap machine at his assisted living facility.  Apparently his right foot caught on his left foot and he fell.  No loss of consciousness.  He is on Xarelto.  His work-up did include a sodium 140, potassium 4.2, BUN 15, creatinine 1.46, white cells 7.7, hemoglobin 12.6 and platelets 164.  The CT of the left hip did show an intertrochanteric fracture.  Chest x-ray was unremarkable.  He did undergo internal fixation.  He also has a history of ischemic cardiomyopathy, CAD status post CABG in 2018 and 2000 along with a history of chronic diastolic heart failure, diabetes, peripheral neuropathy, PAD, hyperlipidemia.  He does have a history of persistent A. fib he does use Xarelto.  He is also on Xarelto for PAD.  He does have asymptomatic severe right internal carotid artery stenosis and back in 2018 the ultrasound did show on the right internal carotid artery 70-99% stenosis.  He has been in the transitional care unit and has been able to participate with the rehabilitation services.  He has made good progress with his strength and overall balance.  Unfortunately though he did end up testing positive for COVID however was completely asymptomatic and was not treated.  His appetite is good.  Taking in good food and fluids and extra nutrient supplements.  Systolic blood pressures ranging 120-140 he has been afebrile and also on room air.  His weight 198 pounds as of June 19 and  then in comparison to June 11 199 pounds.  His pain is also well managed primarily on Tylenol.  No increase use of inhalers.  No shortness of breath or dyspnea.  No increase in allergies.  His blood sugars in the morning since his stay ranging anywhere from 100-145 and then towards the p.m. the range  but most of them less than 150 and he is on Glucotrol Victoza and Lantus.  He will now be discharging from the transitional care unit to his assisted living facility in [place].  Past Medical History:   Diagnosis Date     A-fib (H)      MESHA (acute kidney injury) (H)      Atopic keratoconjunctivitis      Atrial fibrillation (H)     Brian Moe: 8/2011 Cardioversion; CHADS2 VASC = 5; he is on warfarin and sotalol      Atrial flutter (H)      Mcgarry's esophagus 1/1/2012    per note of Dr. Tavo Mike of [facility]     Bilateral lower leg cellulitis 8/31/2018     Candidiasis of perineum 1/3/2018     Carotid stenosis, asymptomatic, right      CHF (congestive heart failure) (H)      Cholecystitis, acute 8/18/2019     Coronary artery disease due to lipid rich plaque 2000    CABG x2     Diabetes (H)      Diabetic ulcer of both feet (H) 10/31/2017     Dyslexia      Dyslipidemia, goal LDL below 70 2000     Epistaxis      Essential hypertension      Gangrene of left foot (H)      GERD (gastroesophageal reflux disease)      HLD (hyperlipidemia)      HTN (hypertension)      Hyponatremia      MRSA (methicillin resistant Staphylococcus aureus)      Neuropathy      Non-STEMI (non-ST elevated myocardial infarction) (H)      Other atopic dermatitis      Peripheral vascular disease (H)      Pneumonia 6/26/2018     Type 2 diabetes mellitus, without long-term current use of insulin (H)      Unable to function independently 11/13/2017            Family History   Problem Relation Age of Onset     Sudden Death Mother 85.00     CABG Father      Valvular heart disease Father      Esophageal Cancer Father 58.00        cause of death     No  "Known Problems Son      No Known Problems Sister      Obesity Sister      Osteoarthritis Sister      No Known Problems Sister      No Known Problems Grandchild      No Known Problems Grandchild       Social History     Socioeconomic History     Marital status:      Number of children: 1   Tobacco Use     Smoking status: Former Smoker     Packs/day: 1.00     Years: 36.00     Pack years: 36.00     Types: Cigarettes, Cigarettes     Start date: 1964     Quit date: 2000     Years since quittin.1     Smokeless tobacco: Never Used   Substance and Sexual Activity     Alcohol use: Yes     Alcohol/week: 5.0 standard drinks     Types: 1 Cans of beer per week     Drug use: No     Sexual activity: Not Currently     Partners: Female   Social History Narrative    Ever lived with his son Raciel in 2017, but then he went to [facility] after foot amputation.  Ever states he will move to a facility in [place] in . 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  Our reception staff at [facility] in [place] confirmed on 2018 that Ever brought himself to the campus and did not utilize a family member or ride service.  I  would also note that he states his granddaughter is name Claire and not Leonela, so I corrected that in the chart (ROSALIA Moe MD). He lives in [facility] in  and is now not driving as he states \"I got a ticket\".           REVIEW OF SYSTEM:  He currently denies any new symptoms of cough or cold flus, postnasal drip, wheezing chest pain dizziness vertigo fevers chills nausea vomiting flulike symptoms constipation diarrhea dysuria frequency or urgency.    PHYSICAL EXAM:   Pleasant gentleman in no acute distress.  Head is normocephalic.    Lung sounds are clear throughout.  Cardiovascular irregularity, history of PVD along with " chronic edema along with his legs being wrapped.  Gastrointestinal does have previous abdominal scars. nondistended.  Musculoskeletal good CMS to his left leg.  Left forefoot amputation.  Participated with the rehabilitation process.  Pain is managed.  Psychiatric: Pleasant affect.    Current Outpatient Medications:      acetaminophen (TYLENOL) 325 MG tablet, Take 650 mg by mouth every 6 hours, Disp: , Rfl:      albuterol (2.5 MG/3ML) 0.083% neb solution, Take 2.5 mg by nebulization every 6 hours as needed, Disp: , Rfl:      albuterol (PROAIR HFA/PROVENTIL HFA/VENTOLIN HFA) 108 (90 Base) MCG/ACT inhaler, Inhale 2 puffs into the lungs 2 times daily And q4h PRN, Disp: , Rfl:      alum & mag hydroxide-simethicone (MAALOX) 200-200-20 MG/5ML SUSP suspension, Take 30 mLs by mouth every 4 hours as needed for indigestion, Disp: , Rfl:      bisacodyl (DULCOLAX) 5 MG EC tablet, Take 5 mg by mouth daily as needed for constipation, Disp: , Rfl:      calcium polycarbophil (FIBERCON) 625 MG tablet, Take 1 tablet by mouth daily, Disp: , Rfl:      celecoxib (CELEBREX) 200 MG capsule, Take 200 mg by mouth daily, Disp: , Rfl:      cetirizine (ZYRTEC) 10 MG tablet, Take 10 mg by mouth daily, Disp: , Rfl:      cyanocobalamin (VITAMIN B-12) 2500 MCG SUBL sublingual tablet, Place 2,500 mcg under the tongue daily, Disp: , Rfl:      glipiZIDE (GLUCOTROL) 10 MG tablet, Take 1 tablet (10 mg) by mouth every morning (before breakfast), Disp: 30 tablet, Rfl: 0     glipiZIDE (GLUCOTROL) 5 MG tablet, Take 3 tablets (15 mg) by mouth every evening, Disp: 30 tablet, Rfl: 0     guaiFENesin (MUCINEX) 600 MG 12 hr tablet, Take 600 mg by mouth 2 times daily, Disp: , Rfl:      insulin glargine (LANTUS PEN) 100 UNIT/ML pen, Inject 26 Units Subcutaneous At Bedtime, Disp: , Rfl:      ipratropium (ATROVENT) 0.06 % nasal spray, Spray 1 spray into both nostrils 4 times daily as needed for rhinitis, Disp: , Rfl:      ketoconazole (NIZORAL) 2 % external shampoo,  Apply topically twice a week Mon and Fri., Disp: , Rfl:      liraglutide (VICTOZA) 18 MG/3ML solution, Inject 0.6 mg Subcutaneous daily, Disp: , Rfl:      mineral oil-hydrophilic petrolatum (AQUAPHOR) external ointment, Apply topically 2 times daily, Disp: , Rfl:      nitroGLYcerin (NITROSTAT) 0.4 MG sublingual tablet, Place 0.4 mg under the tongue every 5 minutes as needed, Disp: , Rfl:      omeprazole (PRILOSEC) 20 MG CR capsule, Take 20 mg by mouth daily, Disp: , Rfl:      polyethylene glycol (MIRALAX) 17 g packet, Take 1 packet by mouth daily as needed for constipation, Disp: , Rfl:      rivaroxaban ANTICOAGULANT (XARELTO) 15 MG TABS tablet, Take by mouth daily (with dinner), Disp: , Rfl:      senna-docusate (SENOKOT-S/PERICOLACE) 8.6-50 MG tablet, Take 1 tablet by mouth 2 times daily And BID PRN, Disp: , Rfl:      simvastatin (ZOCOR) 40 MG tablet, Take 40 mg by mouth At Bedtime, Disp: , Rfl:      spironolactone (ALDACTONE) 25 MG tablet, Take 25 mg by mouth daily, Disp: , Rfl:      triamcinolone (KENALOG) 0.1 % cream, Apply 1 Application topically daily, Disp: , Rfl:      urea (CARMOL) 10 % cream, Apply 1 Application topically daily as needed, Disp: , Rfl:     BP (!) 142/70   Pulse 61   Temp 98.1  F (36.7  C)   Resp 18   Wt 89.8 kg (198 lb)   SpO2 97%   BMI 26.85 kg/m        LABS:   Last Comprehensive Metabolic Panel:  Sodium   Date Value Ref Range Status   05/24/2022 140 136 - 145 mmol/L Final   06/30/2018 139 133 - 144 mmol/L Final     Potassium   Date Value Ref Range Status   05/24/2022 4.3 3.5 - 5.0 mmol/L Final   06/30/2018 4.1 3.4 - 5.3 mmol/L Final     Chloride   Date Value Ref Range Status   05/24/2022 111 (H) 98 - 107 mmol/L Final   06/30/2018 108 94 - 109 mmol/L Final     Carbon Dioxide   Date Value Ref Range Status   06/30/2018 20 20 - 32 mmol/L Final     Carbon Dioxide (CO2)   Date Value Ref Range Status   05/24/2022 20 (L) 22 - 31 mmol/L Final     Anion Gap   Date Value Ref Range Status    05/24/2022 9 5 - 18 mmol/L Final   06/30/2018 11 3 - 14 mmol/L Final     Glucose   Date Value Ref Range Status   05/24/2022 173 (H) 70 - 125 mg/dL Final   06/30/2018 162 (H) 70 - 99 mg/dL Final     Urea Nitrogen   Date Value Ref Range Status   05/24/2022 21 8 - 28 mg/dL Final   06/30/2018 22 7 - 30 mg/dL Final     Creatinine   Date Value Ref Range Status   05/24/2022 1.22 0.70 - 1.30 mg/dL Final   06/30/2018 1.20 0.66 - 1.25 mg/dL Final     GFR Estimate   Date Value Ref Range Status   05/24/2022 61 >60 mL/min/1.73m2 Final     Comment:     Effective December 21, 2021 eGFRcr in adults is calculated using the 2021 CKD-EPI creatinine equation which includes age and gender (Denise gates al., [place], DOI: 10.1056/HCKBkv7523649)   03/09/2021 46 (L) >60 mL/min/1.73m2 Final   06/30/2018 59 (L) >60 mL/min/1.7m2 Final     Comment:     Non  GFR Calc     Calcium   Date Value Ref Range Status   05/24/2022 9.3 8.5 - 10.5 mg/dL Final   06/30/2018 9.0 8.5 - 10.1 mg/dL Final     CBC RESULTS: Recent Labs   Lab Test 05/24/22  0752   WBC 8.9   RBC 3.99*   HGB 11.5*   HCT 36.4*   MCV 91   MCH 28.8   MCHC 31.6   RDW 13.3        Last Comprehensive Metabolic Panel:  Sodium   Date Value Ref Range Status   05/24/2022 140 136 - 145 mmol/L Final   06/30/2018 139 133 - 144 mmol/L Final     Potassium   Date Value Ref Range Status   05/24/2022 4.3 3.5 - 5.0 mmol/L Final   06/30/2018 4.1 3.4 - 5.3 mmol/L Final     Chloride   Date Value Ref Range Status   05/24/2022 111 (H) 98 - 107 mmol/L Final   06/30/2018 108 94 - 109 mmol/L Final     Carbon Dioxide   Date Value Ref Range Status   06/30/2018 20 20 - 32 mmol/L Final     Carbon Dioxide (CO2)   Date Value Ref Range Status   05/24/2022 20 (L) 22 - 31 mmol/L Final     Anion Gap   Date Value Ref Range Status   05/24/2022 9 5 - 18 mmol/L Final   06/30/2018 11 3 - 14 mmol/L Final     Glucose   Date Value Ref Range Status   05/24/2022 173 (H) 70 - 125 mg/dL Final   06/30/2018 162 (H) 70  - 99 mg/dL Final     Urea Nitrogen   Date Value Ref Range Status   05/24/2022 21 8 - 28 mg/dL Final   06/30/2018 22 7 - 30 mg/dL Final     Creatinine   Date Value Ref Range Status   05/24/2022 1.22 0.70 - 1.30 mg/dL Final   06/30/2018 1.20 0.66 - 1.25 mg/dL Final     GFR Estimate   Date Value Ref Range Status   05/24/2022 61 >60 mL/min/1.73m2 Final     Comment:     Effective December 21, 2021 eGFRcr in adults is calculated using the 2021 CKD-EPI creatinine equation which includes age and gender (Denise et al., NEJ, DOI: 10.1056/PCPBzb7745878)   03/09/2021 46 (L) >60 mL/min/1.73m2 Final   06/30/2018 59 (L) >60 mL/min/1.7m2 Final     Comment:     Non  GFR Calc     Calcium   Date Value Ref Range Status   05/24/2022 9.3 8.5 - 10.5 mg/dL Final   06/30/2018 9.0 8.5 - 10.1 mg/dL Final     Bilirubin Total   Date Value Ref Range Status   10/21/2021 1.0 0.0 - 1.0 mg/dL Final   06/29/2018 1.0 0.2 - 1.3 mg/dL Final     Alkaline Phosphatase   Date Value Ref Range Status   10/21/2021 115 45 - 120 U/L Final   06/29/2018 73 40 - 150 U/L Final     ALT   Date Value Ref Range Status   10/21/2021 <9 0 - 45 U/L Final   06/29/2018 25 0 - 70 U/L Final     AST   Date Value Ref Range Status   10/21/2021 13 0 - 40 U/L Final   06/29/2018 24 0 - 45 U/L Final             Recent Labs   Lab Test 03/09/21  0920 03/10/20  1015   CHOL 91 116   HDL 27* 36*   LDL 33 62   TRIG 153* 89         ASSESSMENT:    Encounter Diagnoses   Name Primary?     Type 2 diabetes mellitus with other circulatory complication, with long-term current use of insulin (H) Yes     Essential hypertension      Heart failure with preserved ejection fraction, NYHA class II (H)      Closed nondisplaced intertrochanteric fracture of left femur with routine healing, subsequent encounter        MEDICAL EQUIPMENT NEEDS:  None    DISCHARGE PLAN/FACE TO FACE:  I certify that services are/were furnished while this patient was under the care of a physician and that a  physician or an allowed non-physician practitioner (NPP), had a face-to-face encounter that meets the physician face-to-face encounter requirements. The encounter was in whole, or in part, related to the primary reason for home health. The patient is confined to his/her home and needs intermittent skilled nursing, physical therapy, speech-language pathology, or the continued need for occupational therapy. A plan of care has been established by a physician and is periodically reviewed by a physician.  Date of Face-to-Face Encounter: June 22, 2022    I certify that, based on my findings, the following services are medically necessary home health services: He will be discharging to home with current medications with an approximated discharge date of June 23, 2022 as well as receive physical and occupational therapy along with home health aide and nursing.    My clinical findings support the need for the above skilled services because: (Please write a brief narrative summary that describes what the RN, PT, SLP, or other services will be doing in the home. A list of diagnoses in this section does not meet the CMS requirements.)  He will require the skilled services secondary to his chronic medical conditions including his recent hospitalization but also continuation of home safety transfers and the rehabilitation process secondary to self-care deficits and nursing for medication management including hypertension and diabetes.    This patient is homebound because: (Please write a brief narrative summary describing the functional limitations as to why this patient is homebound and specifically what makes this patient homebound.)  Secondary to chronic medical conditions including his self-care deficits and recent stay in the hospital as well as transitional care unit but continuation of home safety transfers and various exercises as well as nursing for medication management including diabetes and hypertension.    The  patient is, or has been, under my care and I have initiated the establishment of the plan of care. This patient will be followed by a physician who will periodically review the plan of care.    Schedule follow up visit with primary care provider within 7 days to reestablish care.  He will follow-up with orthopedics as previously arranged.  He will follow-up with his primary care doctor regarding medication management and any future laboratory studies.  He may want a visit with diabetic education to go over his insulin as well as care of his diabetes.  He has been a delight to get to know.  Does have a lot of wonderful stories to share as well as a number of interesting life events.  He did not have any other questions.    Discharge coordination care greater than 30 minutes    Electronically signed by: Wagner Mancuso NP          Sincerely,        Wagner Mancuso NP

## 2022-06-22 NOTE — PROGRESS NOTES
[facility] GERIATRIC SERVICES    Facility:   [facility] ([facility]) [[id number]]   Code Status: FULL CODE      CHIEF COMPLAINT/REASON FOR VISIT:  Chief Complaint   Patient presents with     Discharge Summary Nursing Home       HISTORY:      HPI: Ever is a 77 year old male who was hospitalized May 11, 2022 through May 17, 2022 secondary to evaluation after a fall.  He was walking down the hallway with his walker and tried to go about 20 feet without his walker to the Pap machine at his assisted living facility.  Apparently his right foot caught on his left foot and he fell.  No loss of consciousness.  He is on Xarelto.  His work-up did include a sodium 140, potassium 4.2, BUN 15, creatinine 1.46, white cells 7.7, hemoglobin 12.6 and platelets 164.  The CT of the left hip did show an intertrochanteric fracture.  Chest x-ray was unremarkable.  He did undergo internal fixation.  He also has a history of ischemic cardiomyopathy, CAD status post CABG in 2018 and 2000 along with a history of chronic diastolic heart failure, diabetes, peripheral neuropathy, PAD, hyperlipidemia.  He does have a history of persistent A. fib he does use Xarelto.  He is also on Xarelto for PAD.  He does have asymptomatic severe right internal carotid artery stenosis and back in 2018 the ultrasound did show on the right internal carotid artery 70-99% stenosis.  He has been in the transitional care unit and has been able to participate with the rehabilitation services.  He has made good progress with his strength and overall balance.  Unfortunately though he did end up testing positive for COVID however was completely asymptomatic and was not treated.  His appetite is good.  Taking in good food and fluids and extra nutrient supplements.  Systolic blood pressures ranging 120-140 he has been afebrile and also on room air.  His weight 198 pounds as of June 19 and then in comparison to June 11 199 pounds.  His pain is also well managed primarily on  Tylenol.  No increase use of inhalers.  No shortness of breath or dyspnea.  No increase in allergies.  His blood sugars in the morning since his stay ranging anywhere from 100-145 and then towards the p.m. the range  but most of them less than 150 and he is on Glucotrol Victoza and Lantus.  He will now be discharging from the transitional care unit to his assisted living facility in [place].  Past Medical History:   Diagnosis Date     A-fib (H)      MESHA (acute kidney injury) (H)      Atopic keratoconjunctivitis      Atrial fibrillation (H)     Brian Moe: 8/2011 Cardioversion; CHADS2 VASC = 5; he is on warfarin and sotalol      Atrial flutter (H)      Mcgarry's esophagus 1/1/2012    per note of Dr. Tavo Mike of [facility]     Bilateral lower leg cellulitis 8/31/2018     Candidiasis of perineum 1/3/2018     Carotid stenosis, asymptomatic, right      CHF (congestive heart failure) (H)      Cholecystitis, acute 8/18/2019     Coronary artery disease due to lipid rich plaque 2000    CABG x2     Diabetes (H)      Diabetic ulcer of both feet (H) 10/31/2017     Dyslexia      Dyslipidemia, goal LDL below 70 2000     Epistaxis      Essential hypertension      Gangrene of left foot (H)      GERD (gastroesophageal reflux disease)      HLD (hyperlipidemia)      HTN (hypertension)      Hyponatremia      MRSA (methicillin resistant Staphylococcus aureus)      Neuropathy      Non-STEMI (non-ST elevated myocardial infarction) (H)      Other atopic dermatitis      Peripheral vascular disease (H)      Pneumonia 6/26/2018     Type 2 diabetes mellitus, without long-term current use of insulin (H)      Unable to function independently 11/13/2017            Family History   Problem Relation Age of Onset     Sudden Death Mother 85.00     CABG Father      Valvular heart disease Father      Esophageal Cancer Father 58.00        cause of death     No Known Problems Son      No Known Problems Sister      Obesity Sister       "Osteoarthritis Sister      No Known Problems Sister      No Known Problems Grandchild      No Known Problems Grandchild       Social History     Socioeconomic History     Marital status:      Number of children: 1   Tobacco Use     Smoking status: Former Smoker     Packs/day: 1.00     Years: 36.00     Pack years: 36.00     Types: Cigarettes, Cigarettes     Start date: 1964     Quit date: 2000     Years since quittin.1     Smokeless tobacco: Never Used   Substance and Sexual Activity     Alcohol use: Yes     Alcohol/week: 5.0 standard drinks     Types: 1 Cans of beer per week     Drug use: No     Sexual activity: Not Currently     Partners: Female   Social History Narrative    Ever lived with his son Raciel in 2017, but then he went to [facility] after foot amputation.  Ever states he will move to a facility in [place] in . 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  Our reception staff at [facility] in [place] confirmed on 2018 that Ever brought himself to the campus and did not utilize a family member or ride service.  I  would also note that he states his granddaughter is name Claire and not Leonela, so I corrected that in the chart (ROSALIA Moe MD). He lives in [facility] in  and is now not driving as he states \"I got a ticket\".           REVIEW OF SYSTEM:  He currently denies any new symptoms of cough or cold flus, postnasal drip, wheezing chest pain dizziness vertigo fevers chills nausea vomiting flulike symptoms constipation diarrhea dysuria frequency or urgency.    PHYSICAL EXAM:   Pleasant gentleman in no acute distress.  Head is normocephalic.    Lung sounds are clear throughout.  Cardiovascular irregularity, history of PVD along with chronic edema along with his legs being wrapped.  Gastrointestinal does " have previous abdominal scars. nondistended.  Musculoskeletal good CMS to his left leg.  Left forefoot amputation.  Participated with the rehabilitation process.  Pain is managed.  Psychiatric: Pleasant affect.    Current Outpatient Medications:      acetaminophen (TYLENOL) 325 MG tablet, Take 650 mg by mouth every 6 hours, Disp: , Rfl:      albuterol (2.5 MG/3ML) 0.083% neb solution, Take 2.5 mg by nebulization every 6 hours as needed, Disp: , Rfl:      albuterol (PROAIR HFA/PROVENTIL HFA/VENTOLIN HFA) 108 (90 Base) MCG/ACT inhaler, Inhale 2 puffs into the lungs 2 times daily And q4h PRN, Disp: , Rfl:      alum & mag hydroxide-simethicone (MAALOX) 200-200-20 MG/5ML SUSP suspension, Take 30 mLs by mouth every 4 hours as needed for indigestion, Disp: , Rfl:      bisacodyl (DULCOLAX) 5 MG EC tablet, Take 5 mg by mouth daily as needed for constipation, Disp: , Rfl:      calcium polycarbophil (FIBERCON) 625 MG tablet, Take 1 tablet by mouth daily, Disp: , Rfl:      celecoxib (CELEBREX) 200 MG capsule, Take 200 mg by mouth daily, Disp: , Rfl:      cetirizine (ZYRTEC) 10 MG tablet, Take 10 mg by mouth daily, Disp: , Rfl:      cyanocobalamin (VITAMIN B-12) 2500 MCG SUBL sublingual tablet, Place 2,500 mcg under the tongue daily, Disp: , Rfl:      glipiZIDE (GLUCOTROL) 10 MG tablet, Take 1 tablet (10 mg) by mouth every morning (before breakfast), Disp: 30 tablet, Rfl: 0     glipiZIDE (GLUCOTROL) 5 MG tablet, Take 3 tablets (15 mg) by mouth every evening, Disp: 30 tablet, Rfl: 0     guaiFENesin (MUCINEX) 600 MG 12 hr tablet, Take 600 mg by mouth 2 times daily, Disp: , Rfl:      insulin glargine (LANTUS PEN) 100 UNIT/ML pen, Inject 26 Units Subcutaneous At Bedtime, Disp: , Rfl:      ipratropium (ATROVENT) 0.06 % nasal spray, Spray 1 spray into both nostrils 4 times daily as needed for rhinitis, Disp: , Rfl:      ketoconazole (NIZORAL) 2 % external shampoo, Apply topically twice a week Mon and Fri., Disp: , Rfl:       liraglutide (VICTOZA) 18 MG/3ML solution, Inject 0.6 mg Subcutaneous daily, Disp: , Rfl:      mineral oil-hydrophilic petrolatum (AQUAPHOR) external ointment, Apply topically 2 times daily, Disp: , Rfl:      nitroGLYcerin (NITROSTAT) 0.4 MG sublingual tablet, Place 0.4 mg under the tongue every 5 minutes as needed, Disp: , Rfl:      omeprazole (PRILOSEC) 20 MG CR capsule, Take 20 mg by mouth daily, Disp: , Rfl:      polyethylene glycol (MIRALAX) 17 g packet, Take 1 packet by mouth daily as needed for constipation, Disp: , Rfl:      rivaroxaban ANTICOAGULANT (XARELTO) 15 MG TABS tablet, Take by mouth daily (with dinner), Disp: , Rfl:      senna-docusate (SENOKOT-S/PERICOLACE) 8.6-50 MG tablet, Take 1 tablet by mouth 2 times daily And BID PRN, Disp: , Rfl:      simvastatin (ZOCOR) 40 MG tablet, Take 40 mg by mouth At Bedtime, Disp: , Rfl:      spironolactone (ALDACTONE) 25 MG tablet, Take 25 mg by mouth daily, Disp: , Rfl:      triamcinolone (KENALOG) 0.1 % cream, Apply 1 Application topically daily, Disp: , Rfl:      urea (CARMOL) 10 % cream, Apply 1 Application topically daily as needed, Disp: , Rfl:     BP (!) 142/70   Pulse 61   Temp 98.1  F (36.7  C)   Resp 18   Wt 89.8 kg (198 lb)   SpO2 97%   BMI 26.85 kg/m        LABS:   Last Comprehensive Metabolic Panel:  Sodium   Date Value Ref Range Status   05/24/2022 140 136 - 145 mmol/L Final   06/30/2018 139 133 - 144 mmol/L Final     Potassium   Date Value Ref Range Status   05/24/2022 4.3 3.5 - 5.0 mmol/L Final   06/30/2018 4.1 3.4 - 5.3 mmol/L Final     Chloride   Date Value Ref Range Status   05/24/2022 111 (H) 98 - 107 mmol/L Final   06/30/2018 108 94 - 109 mmol/L Final     Carbon Dioxide   Date Value Ref Range Status   06/30/2018 20 20 - 32 mmol/L Final     Carbon Dioxide (CO2)   Date Value Ref Range Status   05/24/2022 20 (L) 22 - 31 mmol/L Final     Anion Gap   Date Value Ref Range Status   05/24/2022 9 5 - 18 mmol/L Final   06/30/2018 11 3 - 14 mmol/L  Final     Glucose   Date Value Ref Range Status   05/24/2022 173 (H) 70 - 125 mg/dL Final   06/30/2018 162 (H) 70 - 99 mg/dL Final     Urea Nitrogen   Date Value Ref Range Status   05/24/2022 21 8 - 28 mg/dL Final   06/30/2018 22 7 - 30 mg/dL Final     Creatinine   Date Value Ref Range Status   05/24/2022 1.22 0.70 - 1.30 mg/dL Final   06/30/2018 1.20 0.66 - 1.25 mg/dL Final     GFR Estimate   Date Value Ref Range Status   05/24/2022 61 >60 mL/min/1.73m2 Final     Comment:     Effective December 21, 2021 eGFRcr in adults is calculated using the 2021 CKD-EPI creatinine equation which includes age and gender (Denise et al., NE, DOI: 10.1056/KPNImf6153752)   03/09/2021 46 (L) >60 mL/min/1.73m2 Final   06/30/2018 59 (L) >60 mL/min/1.7m2 Final     Comment:     Non  GFR Calc     Calcium   Date Value Ref Range Status   05/24/2022 9.3 8.5 - 10.5 mg/dL Final   06/30/2018 9.0 8.5 - 10.1 mg/dL Final     CBC RESULTS: Recent Labs   Lab Test 05/24/22  0752   WBC 8.9   RBC 3.99*   HGB 11.5*   HCT 36.4*   MCV 91   MCH 28.8   MCHC 31.6   RDW 13.3        Last Comprehensive Metabolic Panel:  Sodium   Date Value Ref Range Status   05/24/2022 140 136 - 145 mmol/L Final   06/30/2018 139 133 - 144 mmol/L Final     Potassium   Date Value Ref Range Status   05/24/2022 4.3 3.5 - 5.0 mmol/L Final   06/30/2018 4.1 3.4 - 5.3 mmol/L Final     Chloride   Date Value Ref Range Status   05/24/2022 111 (H) 98 - 107 mmol/L Final   06/30/2018 108 94 - 109 mmol/L Final     Carbon Dioxide   Date Value Ref Range Status   06/30/2018 20 20 - 32 mmol/L Final     Carbon Dioxide (CO2)   Date Value Ref Range Status   05/24/2022 20 (L) 22 - 31 mmol/L Final     Anion Gap   Date Value Ref Range Status   05/24/2022 9 5 - 18 mmol/L Final   06/30/2018 11 3 - 14 mmol/L Final     Glucose   Date Value Ref Range Status   05/24/2022 173 (H) 70 - 125 mg/dL Final   06/30/2018 162 (H) 70 - 99 mg/dL Final     Urea Nitrogen   Date Value Ref Range Status    05/24/2022 21 8 - 28 mg/dL Final   06/30/2018 22 7 - 30 mg/dL Final     Creatinine   Date Value Ref Range Status   05/24/2022 1.22 0.70 - 1.30 mg/dL Final   06/30/2018 1.20 0.66 - 1.25 mg/dL Final     GFR Estimate   Date Value Ref Range Status   05/24/2022 61 >60 mL/min/1.73m2 Final     Comment:     Effective December 21, 2021 eGFRcr in adults is calculated using the 2021 CKD-EPI creatinine equation which includes age and gender (Denise et al., NEJ, DOI: 10.1056/AGSBiz0015948)   03/09/2021 46 (L) >60 mL/min/1.73m2 Final   06/30/2018 59 (L) >60 mL/min/1.7m2 Final     Comment:     Non  GFR Calc     Calcium   Date Value Ref Range Status   05/24/2022 9.3 8.5 - 10.5 mg/dL Final   06/30/2018 9.0 8.5 - 10.1 mg/dL Final     Bilirubin Total   Date Value Ref Range Status   10/21/2021 1.0 0.0 - 1.0 mg/dL Final   06/29/2018 1.0 0.2 - 1.3 mg/dL Final     Alkaline Phosphatase   Date Value Ref Range Status   10/21/2021 115 45 - 120 U/L Final   06/29/2018 73 40 - 150 U/L Final     ALT   Date Value Ref Range Status   10/21/2021 <9 0 - 45 U/L Final   06/29/2018 25 0 - 70 U/L Final     AST   Date Value Ref Range Status   10/21/2021 13 0 - 40 U/L Final   06/29/2018 24 0 - 45 U/L Final             Recent Labs   Lab Test 03/09/21  0920 03/10/20  1015   CHOL 91 116   HDL 27* 36*   LDL 33 62   TRIG 153* 89         ASSESSMENT:    Encounter Diagnoses   Name Primary?     Type 2 diabetes mellitus with other circulatory complication, with long-term current use of insulin (H) Yes     Essential hypertension      Heart failure with preserved ejection fraction, NYHA class II (H)      Closed nondisplaced intertrochanteric fracture of left femur with routine healing, subsequent encounter        MEDICAL EQUIPMENT NEEDS:  None    DISCHARGE PLAN/FACE TO FACE:  I certify that services are/were furnished while this patient was under the care of a physician and that a physician or an allowed non-physician practitioner (NPP), had a  face-to-face encounter that meets the physician face-to-face encounter requirements. The encounter was in whole, or in part, related to the primary reason for home health. The patient is confined to his/her home and needs intermittent skilled nursing, physical therapy, speech-language pathology, or the continued need for occupational therapy. A plan of care has been established by a physician and is periodically reviewed by a physician.  Date of Face-to-Face Encounter: June 22, 2022    I certify that, based on my findings, the following services are medically necessary home health services: He will be discharging to home with current medications with an approximated discharge date of June 23, 2022 as well as receive physical and occupational therapy along with home health aide and nursing.    My clinical findings support the need for the above skilled services because: (Please write a brief narrative summary that describes what the RN, PT, SLP, or other services will be doing in the home. A list of diagnoses in this section does not meet the CMS requirements.)  He will require the skilled services secondary to his chronic medical conditions including his recent hospitalization but also continuation of home safety transfers and the rehabilitation process secondary to self-care deficits and nursing for medication management including hypertension and diabetes.    This patient is homebound because: (Please write a brief narrative summary describing the functional limitations as to why this patient is homebound and specifically what makes this patient homebound.)  Secondary to chronic medical conditions including his self-care deficits and recent stay in the hospital as well as transitional care unit but continuation of home safety transfers and various exercises as well as nursing for medication management including diabetes and hypertension.    The patient is, or has been, under my care and I have initiated the  establishment of the plan of care. This patient will be followed by a physician who will periodically review the plan of care.    Schedule follow up visit with primary care provider within 7 days to reestablish care.  He will follow-up with orthopedics as previously arranged.  He will follow-up with his primary care doctor regarding medication management and any future laboratory studies.  He may want a visit with diabetic education to go over his insulin as well as care of his diabetes.  He has been a delight to get to know.  Does have a lot of wonderful stories to share as well as a number of interesting life events.  He did not have any other questions.    Discharge coordination care greater than 30 minutes    Electronically signed by: Wagner Mancuso NP

## 2022-07-11 ENCOUNTER — LAB REQUISITION (OUTPATIENT)
Dept: LAB | Facility: CLINIC | Age: 77
End: 2022-07-11
Payer: MEDICARE

## 2022-07-11 DIAGNOSIS — G90.9 DISORDER OF THE AUTONOMIC NERVOUS SYSTEM, UNSPECIFIED: ICD-10-CM

## 2022-07-11 DIAGNOSIS — I50.9 HEART FAILURE, UNSPECIFIED (H): ICD-10-CM

## 2022-07-11 DIAGNOSIS — E11.9 TYPE 2 DIABETES MELLITUS WITHOUT COMPLICATIONS (H): ICD-10-CM

## 2022-07-12 LAB
ALBUMIN SERPL BCG-MCNC: 4.2 G/DL (ref 3.5–5.2)
ALP SERPL-CCNC: 115 U/L (ref 40–129)
ALT SERPL W P-5'-P-CCNC: 8 U/L (ref 10–50)
ANION GAP SERPL CALCULATED.3IONS-SCNC: 12 MMOL/L (ref 7–15)
AST SERPL W P-5'-P-CCNC: 18 U/L (ref 10–50)
BILIRUB SERPL-MCNC: 0.4 MG/DL
BUN SERPL-MCNC: 16.8 MG/DL (ref 8–23)
CALCIUM SERPL-MCNC: 9.5 MG/DL (ref 8.8–10.2)
CHLORIDE SERPL-SCNC: 105 MMOL/L (ref 98–107)
CREAT SERPL-MCNC: 1.32 MG/DL (ref 0.67–1.17)
DEPRECATED HCO3 PLAS-SCNC: 24 MMOL/L (ref 22–29)
GFR SERPL CREATININE-BSD FRML MDRD: 57 ML/MIN/1.73M2
GLUCOSE SERPL-MCNC: 119 MG/DL (ref 70–99)
HBA1C MFR BLD: 7 %
POTASSIUM SERPL-SCNC: 4.6 MMOL/L (ref 3.4–5.3)
PROT SERPL-MCNC: 7.3 G/DL (ref 6.4–8.3)
SODIUM SERPL-SCNC: 141 MMOL/L (ref 136–145)
TSH SERPL DL<=0.005 MIU/L-ACNC: 3.2 UIU/ML (ref 0.3–4.2)
VIT B12 SERPL-MCNC: 1587 PG/ML (ref 232–1245)

## 2022-07-12 PROCEDURE — 36415 COLL VENOUS BLD VENIPUNCTURE: CPT | Mod: ORL | Performed by: INTERNAL MEDICINE

## 2022-07-12 PROCEDURE — 80053 COMPREHEN METABOLIC PANEL: CPT | Mod: ORL | Performed by: INTERNAL MEDICINE

## 2022-07-12 PROCEDURE — 84443 ASSAY THYROID STIM HORMONE: CPT | Mod: ORL | Performed by: INTERNAL MEDICINE

## 2022-07-12 PROCEDURE — 82607 VITAMIN B-12: CPT | Mod: ORL | Performed by: INTERNAL MEDICINE

## 2022-07-12 PROCEDURE — P9603 ONE-WAY ALLOW PRORATED MILES: HCPCS | Mod: ORL | Performed by: INTERNAL MEDICINE

## 2022-07-12 PROCEDURE — 83036 HEMOGLOBIN GLYCOSYLATED A1C: CPT | Mod: ORL | Performed by: INTERNAL MEDICINE

## 2022-10-10 ENCOUNTER — LAB REQUISITION (OUTPATIENT)
Dept: LAB | Facility: CLINIC | Age: 77
End: 2022-10-10
Payer: MEDICARE

## 2022-10-10 DIAGNOSIS — N18.32 CHRONIC KIDNEY DISEASE, STAGE 3B (H): ICD-10-CM

## 2022-10-11 LAB
ANION GAP SERPL CALCULATED.3IONS-SCNC: 13 MMOL/L (ref 7–15)
BUN SERPL-MCNC: 20.2 MG/DL (ref 8–23)
CALCIUM SERPL-MCNC: 9.6 MG/DL (ref 8.8–10.2)
CHLORIDE SERPL-SCNC: 101 MMOL/L (ref 98–107)
CREAT SERPL-MCNC: 1.57 MG/DL (ref 0.67–1.17)
DEPRECATED HCO3 PLAS-SCNC: 23 MMOL/L (ref 22–29)
GFR SERPL CREATININE-BSD FRML MDRD: 45 ML/MIN/1.73M2
GLUCOSE SERPL-MCNC: 77 MG/DL (ref 70–99)
POTASSIUM SERPL-SCNC: 4.3 MMOL/L (ref 3.4–5.3)
SODIUM SERPL-SCNC: 137 MMOL/L (ref 136–145)

## 2022-10-11 PROCEDURE — 80048 BASIC METABOLIC PNL TOTAL CA: CPT | Mod: ORL | Performed by: NURSE PRACTITIONER

## 2022-10-11 PROCEDURE — P9603 ONE-WAY ALLOW PRORATED MILES: HCPCS | Mod: ORL | Performed by: NURSE PRACTITIONER

## 2022-10-11 PROCEDURE — 36415 COLL VENOUS BLD VENIPUNCTURE: CPT | Mod: ORL | Performed by: NURSE PRACTITIONER

## 2022-11-24 ENCOUNTER — MEDICAL CORRESPONDENCE (OUTPATIENT)
Dept: HEALTH INFORMATION MANAGEMENT | Facility: CLINIC | Age: 77
End: 2022-11-24

## 2022-12-10 ENCOUNTER — LAB REQUISITION (OUTPATIENT)
Dept: LAB | Facility: CLINIC | Age: 77
End: 2022-12-10
Payer: MEDICARE

## 2022-12-10 DIAGNOSIS — N18.30 CHRONIC KIDNEY DISEASE, STAGE 3 UNSPECIFIED (H): ICD-10-CM

## 2022-12-13 LAB
ANION GAP SERPL CALCULATED.3IONS-SCNC: 11 MMOL/L (ref 7–15)
BUN SERPL-MCNC: 18.2 MG/DL (ref 8–23)
CALCIUM SERPL-MCNC: 8.6 MG/DL (ref 8.8–10.2)
CHLORIDE SERPL-SCNC: 106 MMOL/L (ref 98–107)
CREAT SERPL-MCNC: 1.37 MG/DL (ref 0.67–1.17)
DEPRECATED HCO3 PLAS-SCNC: 24 MMOL/L (ref 22–29)
GFR SERPL CREATININE-BSD FRML MDRD: 53 ML/MIN/1.73M2
GLUCOSE SERPL-MCNC: 138 MG/DL (ref 70–99)
POTASSIUM SERPL-SCNC: 4.5 MMOL/L (ref 3.4–5.3)
SODIUM SERPL-SCNC: 141 MMOL/L (ref 136–145)

## 2022-12-13 PROCEDURE — P9603 ONE-WAY ALLOW PRORATED MILES: HCPCS | Mod: ORL | Performed by: NURSE PRACTITIONER

## 2022-12-13 PROCEDURE — 80048 BASIC METABOLIC PNL TOTAL CA: CPT | Mod: ORL | Performed by: NURSE PRACTITIONER

## 2022-12-13 PROCEDURE — 36415 COLL VENOUS BLD VENIPUNCTURE: CPT | Mod: ORL | Performed by: NURSE PRACTITIONER

## 2022-12-28 ENCOUNTER — TRANSCRIBE ORDERS (OUTPATIENT)
Dept: VASCULAR SURGERY | Facility: CLINIC | Age: 77
End: 2022-12-28

## 2022-12-28 DIAGNOSIS — S81.802A OPEN WOUND OF BOTH LEGS WITH COMPLICATION: Primary | ICD-10-CM

## 2022-12-28 DIAGNOSIS — S81.801A OPEN WOUND OF BOTH LEGS WITH COMPLICATION: Primary | ICD-10-CM

## 2022-12-30 ENCOUNTER — LAB REQUISITION (OUTPATIENT)
Dept: LAB | Facility: CLINIC | Age: 77
End: 2022-12-30
Payer: MEDICARE

## 2022-12-30 DIAGNOSIS — N18.9 CHRONIC KIDNEY DISEASE, UNSPECIFIED: ICD-10-CM

## 2023-01-03 LAB
ANION GAP SERPL CALCULATED.3IONS-SCNC: 15 MMOL/L (ref 7–15)
BUN SERPL-MCNC: 22.2 MG/DL (ref 8–23)
CALCIUM SERPL-MCNC: 9.7 MG/DL (ref 8.8–10.2)
CHLORIDE SERPL-SCNC: 106 MMOL/L (ref 98–107)
CREAT SERPL-MCNC: 1.81 MG/DL (ref 0.67–1.17)
DEPRECATED HCO3 PLAS-SCNC: 19 MMOL/L (ref 22–29)
GFR SERPL CREATININE-BSD FRML MDRD: 38 ML/MIN/1.73M2
GLUCOSE SERPL-MCNC: 78 MG/DL (ref 70–99)
POTASSIUM SERPL-SCNC: 4.9 MMOL/L (ref 3.4–5.3)
SODIUM SERPL-SCNC: 140 MMOL/L (ref 136–145)

## 2023-01-03 PROCEDURE — 36415 COLL VENOUS BLD VENIPUNCTURE: CPT | Mod: ORL | Performed by: NURSE PRACTITIONER

## 2023-01-03 PROCEDURE — P9603 ONE-WAY ALLOW PRORATED MILES: HCPCS | Mod: ORL | Performed by: NURSE PRACTITIONER

## 2023-01-03 PROCEDURE — 80048 BASIC METABOLIC PNL TOTAL CA: CPT | Mod: ORL | Performed by: NURSE PRACTITIONER

## 2023-01-05 ENCOUNTER — LAB REQUISITION (OUTPATIENT)
Dept: LAB | Facility: CLINIC | Age: 78
End: 2023-01-05
Payer: MEDICARE

## 2023-01-05 ENCOUNTER — OFFICE VISIT (OUTPATIENT)
Dept: VASCULAR SURGERY | Facility: CLINIC | Age: 78
End: 2023-01-05
Attending: NURSE PRACTITIONER
Payer: MEDICARE

## 2023-01-05 VITALS — RESPIRATION RATE: 18 BRPM | HEART RATE: 60 BPM | DIASTOLIC BLOOD PRESSURE: 82 MMHG | SYSTOLIC BLOOD PRESSURE: 140 MMHG

## 2023-01-05 DIAGNOSIS — L03.119 CELLULITIS AND ABSCESS OF FOOT EXCLUDING TOE: Primary | ICD-10-CM

## 2023-01-05 DIAGNOSIS — L97.514 DIABETIC ULCER OF TOE OF RIGHT FOOT ASSOCIATED WITH DIABETES MELLITUS DUE TO UNDERLYING CONDITION, WITH NECROSIS OF BONE (H): ICD-10-CM

## 2023-01-05 DIAGNOSIS — N18.30 CHRONIC KIDNEY DISEASE, STAGE 3 UNSPECIFIED (H): ICD-10-CM

## 2023-01-05 DIAGNOSIS — L02.619 CELLULITIS AND ABSCESS OF FOOT EXCLUDING TOE: Primary | ICD-10-CM

## 2023-01-05 DIAGNOSIS — E08.621 DIABETIC ULCER OF TOE OF RIGHT FOOT ASSOCIATED WITH DIABETES MELLITUS DUE TO UNDERLYING CONDITION, LIMITED TO BREAKDOWN OF SKIN (H): ICD-10-CM

## 2023-01-05 DIAGNOSIS — L97.511 DIABETIC ULCER OF TOE OF RIGHT FOOT ASSOCIATED WITH DIABETES MELLITUS DUE TO UNDERLYING CONDITION, LIMITED TO BREAKDOWN OF SKIN (H): ICD-10-CM

## 2023-01-05 DIAGNOSIS — I73.9 PAD (PERIPHERAL ARTERY DISEASE) (H): ICD-10-CM

## 2023-01-05 DIAGNOSIS — E08.621 DIABETIC ULCER OF TOE OF RIGHT FOOT ASSOCIATED WITH DIABETES MELLITUS DUE TO UNDERLYING CONDITION, WITH NECROSIS OF BONE (H): ICD-10-CM

## 2023-01-05 PROCEDURE — 97597 DBRDMT OPN WND 1ST 20 CM/<: CPT | Performed by: PODIATRIST

## 2023-01-05 PROCEDURE — 99204 OFFICE O/P NEW MOD 45 MIN: CPT | Mod: 25 | Performed by: PODIATRIST

## 2023-01-05 PROCEDURE — G0463 HOSPITAL OUTPT CLINIC VISIT: HCPCS | Mod: 25 | Performed by: PODIATRIST

## 2023-01-05 PROCEDURE — 11044 DBRDMT BONE 1ST 20 SQ CM/<: CPT | Performed by: PODIATRIST

## 2023-01-05 PROCEDURE — 87070 CULTURE OTHR SPECIMN AEROBIC: CPT | Performed by: PODIATRIST

## 2023-01-05 PROCEDURE — 11042 DBRDMT SUBQ TIS 1ST 20SQCM/<: CPT | Mod: 59 | Performed by: PODIATRIST

## 2023-01-05 RX ORDER — SULFAMETHOXAZOLE/TRIMETHOPRIM 800-160 MG
1 TABLET ORAL 2 TIMES DAILY
Qty: 20 TABLET | Refills: 0 | Status: SHIPPED | OUTPATIENT
Start: 2023-01-05 | End: 2023-01-13

## 2023-01-05 ASSESSMENT — PAIN SCALES - GENERAL: PAINLEVEL: NO PAIN (0)

## 2023-01-05 NOTE — PATIENT INSTRUCTIONS
Apply Dry gauze to right foot, change daily and as needed.     Start your antibiotics today!

## 2023-01-05 NOTE — PROGRESS NOTES
FOOT AND ANKLE SURGERY/PODIATRY CONSULT NOTE        ASSESSMENT:   Diabetic Ulcerations digits 2-5 right foot  Osteomyelitis right foot  DM2  PAD      TREATMENT:  -I discussed with the patient that there is non-viable tissue along the lateral 4th digit right foot with exposed bone, and necrotic tissue along the lateral 5th digit and 5th MPJ. Remaining ulcers on 2nd and 3rd digits right foot are stable.     -Based on the above, we discussed that salvage of digits 4,5 is not likely possible, and transmetatarsal amputation may be recommended pending further imaging.     -I have referred him for a right foot MRI.     -Wound culture obtained today. I will start him on Bactrim.     -Last EVELYNE's performed in 2018. Referred for updated EVELYNE's with TBI.     -Diabetes appears well controlled.     -After discussion of risk factors and consent obtained 2% Lidocaine HCL jelly was applied, under clean conditions, the 4th digit right foot ulceration(s) were debrided using .bone.  Devitalized and nonviable tissue, along with any fibrin and slough, was removed to improve granulation tissue formation, stimulate wound healing, decrease overall bacteria load, disrupt biofilm formation and decrease edge senescence. Wound drainage was scant No. Total excisional debridement was 4 sq cm bone with a depth of 0.5 cm.   Ulcers were improved afterwards and .  Measures were as noted on the flow sheet. A gauze dressing was applied. He will continue to apply a gauze dressing qday.    -After discussion of risk factors and consent obtained 2% Lidocaine HCL jelly was applied, under clean conditions, the 2nd digit ulceration(s) were debrided using .into the subcutaneous tissue.  Devitalized and nonviable tissue, along with any fibrin and slough, was removed to improve granulation tissue formation, stimulate wound healing, decrease overall bacteria load, disrupt biofilm formation and decrease edge senescence. Wound drainage was scant No.  Total excisional debridement was 6 sq cm into the subcutaneous tissue with a depth of 0.2 cm.   Ulcers were improved afterwards and .  Measures were as noted on the flow sheet. A gauze dressing was applied. He will continue to apply a gauze dressing qday.    -After discussion of risk factors and consent obtained 2% Lidocaine HCL jelly was applied, under clean conditions, the 3rd digit right foot ulceration(s) were debrided using .from the epidermis/dermis area.  Devitalized and nonviable tissue, along with any fibrin and slough, was removed to improve granulation tissue formation, stimulate wound healing, decrease overall bacteria load, disrupt biofilm formation and decrease edge senescence. Wound drainage was scant No. Total excisional debridement was 9 sq cm from the epidermis/dermis area with a depth of 0.1 cm.   Ulcers were improved afterwards and .  Measures were as noted on the flow sheet. A gauze dressing was applied. He will continue to apply a gauze dressing qday.    -He will follow-up in one week. I will contact the patient with the MRI report when available and we will be guided by the results.     Miguelangel Spears DPM  [facility] [place]      HPI: Ever Crane was seen today for right foot ulcerations. The patient states he first noticed the sores several weeks ago, but is not sure on this timeline. He lives in a AL facility and is being followed by the [facility]. PMH significant for DM2, PAD. Former smoker. He did undergo a left TMA 7 years ago.     Past Medical History:   Diagnosis Date     A-fib (H)      MESHA (acute kidney injury) (H)      Atopic keratoconjunctivitis      Atrial fibrillation (H)     Brian Moe: 8/2011 Cardioversion; CHADS2 VASC = 5; he is on warfarin and sotalol      Atrial flutter (H)      Mcgarry's esophagus 1/1/2012    per note of Dr. Tavo Mike of [facility]     Bilateral lower leg cellulitis 8/31/2018     Candidiasis of perineum 1/3/2018      Carotid stenosis, asymptomatic, right      CHF (congestive heart failure) (H)      Cholecystitis, acute 8/18/2019     Coronary artery disease due to lipid rich plaque 2000    CABG x2     Diabetes (H)      Diabetic ulcer of both feet (H) 10/31/2017     Dyslexia      Dyslipidemia, goal LDL below 70 2000     Epistaxis      Essential hypertension      Gangrene of left foot (H)      GERD (gastroesophageal reflux disease)      HLD (hyperlipidemia)      HTN (hypertension)      Hyponatremia      MRSA (methicillin resistant Staphylococcus aureus)      Neuropathy      Non-STEMI (non-ST elevated myocardial infarction) (H)      Other atopic dermatitis      Peripheral vascular disease (H)      Pneumonia 6/26/2018     Type 2 diabetes mellitus, without long-term current use of insulin (H)      Unable to function independently 11/13/2017       Past Surgical History:   Procedure Laterality Date     AMPUTATE FOOT Left 11/05/2017    Procedure: LEFT TRANSMETATARSAL AMPUTATION;  Surgeon: Ever Wick MD;  Location: [facility];  Service:      BYPASS GRAFT ARTERY CORONARY N/A 03/06/2000    SVG to OM1, SVG to PDA     BYPASS GRAFT ARTERY CORONARY N/A 10/04/2018    redo CABG due to graft failure     CABG MEASURES GRP       CARDIOVERSION  08/25/2011     CV CORONARY ANGIOGRAM N/A 10/01/2018    Procedure: Coronary Angiogram;  Surgeon: Miki Mac MD;  Location: [facility];  Service:      INGUINAL HERNIA REPAIR Bilateral 1969    and 1979     IR EXTREMITY ANGIOGRAM BILATERAL  11/3/2017     LAPAROSCOPIC CHOLECYSTECTOMY N/A 08/18/2019    Procedure: CHOLECYSTECTOMY, LAPAROSCOPIC;  Surgeon: Stewart Fountain MD;  Location: [facility] OR;  Service: General        Allergies   Allergen Reactions     Doxycycline      Other reaction(s): rash     Penicillin V      Other reaction(s): Unknown     Latex Rash     Penicillins Rash     When he was a child  Tolerated Zosyn 6/2018, also tolerated Augmentin?  Childhood rxn  --  tolerated cefazolin 10/4/18         Current Outpatient Medications:      sulfamethoxazole-trimethoprim (BACTRIM DS) 800-160 MG tablet, Take 1 tablet by mouth 2 times daily, Disp: 20 tablet, Rfl: 0     acetaminophen (TYLENOL) 325 MG tablet, Take 650 mg by mouth every 6 hours, Disp: , Rfl:      albuterol (2.5 MG/3ML) 0.083% neb solution, Take 2.5 mg by nebulization every 6 hours as needed, Disp: , Rfl:      albuterol (PROAIR HFA/PROVENTIL HFA/VENTOLIN HFA) 108 (90 Base) MCG/ACT inhaler, Inhale 2 puffs into the lungs 2 times daily And q4h PRN, Disp: , Rfl:      alum & mag hydroxide-simethicone (MAALOX) 200-200-20 MG/5ML SUSP suspension, Take 30 mLs by mouth every 4 hours as needed for indigestion, Disp: , Rfl:      bisacodyl (DULCOLAX) 5 MG EC tablet, Take 5 mg by mouth daily as needed for constipation, Disp: , Rfl:      calcium polycarbophil (FIBERCON) 625 MG tablet, Take 1 tablet by mouth daily, Disp: , Rfl:      celecoxib (CELEBREX) 200 MG capsule, Take 200 mg by mouth daily, Disp: , Rfl:      cetirizine (ZYRTEC) 10 MG tablet, Take 10 mg by mouth daily, Disp: , Rfl:      cyanocobalamin (VITAMIN B-12) 2500 MCG SUBL sublingual tablet, Place 2,500 mcg under the tongue daily, Disp: , Rfl:      glipiZIDE (GLUCOTROL) 10 MG tablet, Take 1 tablet (10 mg) by mouth every morning (before breakfast), Disp: 30 tablet, Rfl: 0     glipiZIDE (GLUCOTROL) 5 MG tablet, Take 3 tablets (15 mg) by mouth every evening, Disp: 30 tablet, Rfl: 0     guaiFENesin (MUCINEX) 600 MG 12 hr tablet, Take 600 mg by mouth 2 times daily, Disp: , Rfl:      insulin glargine (LANTUS PEN) 100 UNIT/ML pen, Inject 26 Units Subcutaneous At Bedtime, Disp: , Rfl:      ipratropium (ATROVENT) 0.06 % nasal spray, Spray 1 spray into both nostrils 4 times daily as needed for rhinitis, Disp: , Rfl:      ketoconazole (NIZORAL) 2 % external shampoo, Apply topically twice a week Mon and Fri., Disp: , Rfl:      liraglutide (VICTOZA) 18 MG/3ML solution, Inject 0.6 mg  "Subcutaneous daily, Disp: , Rfl:      mineral oil-hydrophilic petrolatum (AQUAPHOR) external ointment, Apply topically 2 times daily, Disp: , Rfl:      nitroGLYcerin (NITROSTAT) 0.4 MG sublingual tablet, Place 0.4 mg under the tongue every 5 minutes as needed, Disp: , Rfl:      omeprazole (PRILOSEC) 20 MG CR capsule, Take 20 mg by mouth daily, Disp: , Rfl:      polyethylene glycol (MIRALAX) 17 g packet, Take 1 packet by mouth daily as needed for constipation, Disp: , Rfl:      rivaroxaban ANTICOAGULANT (XARELTO) 15 MG TABS tablet, Take by mouth daily (with dinner), Disp: , Rfl:      senna-docusate (SENOKOT-S/PERICOLACE) 8.6-50 MG tablet, Take 1 tablet by mouth 2 times daily And BID PRN, Disp: , Rfl:      simvastatin (ZOCOR) 40 MG tablet, Take 40 mg by mouth At Bedtime, Disp: , Rfl:      spironolactone (ALDACTONE) 25 MG tablet, Take 25 mg by mouth daily, Disp: , Rfl:      triamcinolone (KENALOG) 0.1 % cream, Apply 1 Application topically daily, Disp: , Rfl:      urea (CARMOL) 10 % cream, Apply 1 Application topically daily as needed, Disp: , Rfl:     Social History     Social History Narrative    Ever lived with his son Raciel in member, 2017, but then he went to [facility] after foot amputation.  Ever states he will move to a facility in [place] in June, 2018. 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  Our reception staff at [facility] in [place] confirmed on June 13, 2018 that Ever brought himself to the campus and did not utilize a family member or ride service.  I  would also note that he states his granddaughter is name Claire and not Leonela, so I corrected that in the chart (ROSALIA Moe MD). He lives in [facility] in 2019 and is now not driving as he states \"I got a ticket\".          Family History   Problem Relation Age of Onset     Sudden Death Mother 85.00 "     CABG Father      Valvular heart disease Father      Esophageal Cancer Father 58.00        cause of death     No Known Problems Son      No Known Problems Sister      Obesity Sister      Osteoarthritis Sister      No Known Problems Sister      No Known Problems Grandchild      No Known Problems Grandchild        Review of Systems - 10 point Review of Systems is negative except for right foot ulcers which is noted in HPI.      OBJECTIVE:  Appearance: alert, well appearing, and in no distress.    BP (!) 140/82   Pulse 60   Resp 18     BMI= There is no height or weight on file to calculate BMI.    General appearance: Patient is alert and fully cooperative with history & exam.  No sign of distress is noted during the visit.     Psychiatric: Affect is pleasant & appropriate.  Patient appears motivated to improve health.     Respiratory: Breathing is regular & unlabored while sitting.     HEENT: Hearing is intact to spoken word.  Speech is clear.  No gross evidence of visual impairment that would impact ambulation.    Vascular: Dorsalis pedis non-palpableRight.  Dermatologic:   VASC Wound right foot 2nd toe (Active)   Pre Size Length 3 01/05/23 1100   Pre Size Width 2 01/05/23 1100   Pre Size Depth 0.2 01/05/23 1100   Pre Total Sq cm 6 01/05/23 1100       VASC Wound right foot 3rd toe (Active)   Pre Size Length 3 01/05/23 1100   Pre Size Width 3 01/05/23 1100   Pre Size Depth 0.1 01/05/23 1100   Pre Total Sq cm 9 01/05/23 1100       VASC Wound right foot btween 3rd and 4th toe (Active)   Pre Size Length 2 01/05/23 1100   Pre Size Width 3 01/05/23 1100   Pre Size Depth 0.2 01/05/23 1100   Pre Total Sq cm 6 01/05/23 1100       VASC Wound right foot 4th toe (Active)   Pre Size Length 2 01/05/23 1100   Pre Size Width 2 01/05/23 1100   Pre Size Depth 0.5 01/05/23 1100   Pre Total Sq cm 4 01/05/23 1100       VASC Wound right foot 5th toe (Active)   Pre Size Length 3 01/05/23 1100   Pre Size Width 4 01/05/23 1100   Pre Size  Depth 0.2 01/05/23 1100   Pre Total Sq cm 12 01/05/23 1100   Ulcerations digits 2,3 right foot has granular tissue. Non-viable tissue along lateral 4th digit right foot with exposed bone. Necrotic tissue along the lateral 5th digit and 5th MPJ right foot. Mild erythema distal right forefoot.   Neurologic: Diminished to light touch Right.  Musculoskeletal: Contracted digits noted Right. Left TMA.

## 2023-01-06 ENCOUNTER — NURSE TRIAGE (OUTPATIENT)
Dept: NURSING | Facility: CLINIC | Age: 78
End: 2023-01-06

## 2023-01-06 ENCOUNTER — TELEPHONE (OUTPATIENT)
Dept: VASCULAR SURGERY | Facility: CLINIC | Age: 78
End: 2023-01-06

## 2023-01-06 DIAGNOSIS — L03.119 CELLULITIS AND ABSCESS OF FOOT EXCLUDING TOE: ICD-10-CM

## 2023-01-06 DIAGNOSIS — L02.619 CELLULITIS AND ABSCESS OF FOOT EXCLUDING TOE: ICD-10-CM

## 2023-01-06 NOTE — TELEPHONE ENCOUNTER
Guera Salazar is calling wondering if the apt on 1/11 can be moved out a week?  She states he takes medical transportation and it would be very difficult for him to get picked up before 7 to get here for the ultrasound.  Please advise and call her back.

## 2023-01-06 NOTE — TELEPHONE ENCOUNTER
Writer spoke with Marie, explained why the EVELYNE is needed and how waiting a week could make his wounds progress more. Marie understood why it was needed. They will keep the appt on 1/11/23.

## 2023-01-06 NOTE — TELEPHONE ENCOUNTER
Pt calling because he would like his Rx that was sent in yesterday to be filled at a different pharmacy than we have on file.    Pt would like his prescription to go to a different [facility] location.    Advised pt that he can call the [facility] location he would like his Rx filled at and have them transfer and fill it there. Pt verbalized understanding.    Nisha Wick, RN, BSN  [facility]   Triage Nurse Advisor      Reason for Disposition    Caller has medication question about med NOT prescribed by PCP and triager unable to answer question (e.g., compatibility with other med, storage)    Additional Information    Negative: Intentional drug overdose and suicidal thoughts or ideas    Negative: Drug overdose and triager unable to answer question    Negative: Caller requesting a renewal or refill of a medicine patient is currently taking    Negative: Caller requesting information unrelated to medicine    Negative: Caller requesting information about COVID-19 Vaccine    Negative: Caller requesting information about Emergency Contraception    Negative: Caller requesting information about Combined Birth Control Pills    Negative: Caller requesting information about Progestin Birth Control Pills    Negative: Caller requesting information about Post-Op pain or medicines    Negative: Caller requesting a prescription antibiotic (such as penicillin) for Strep throat and has a positive culture result    Negative: Caller requesting a prescription anti-viral med (such as Tamiflu) and has influenza (flu) symptoms    Negative: Immunization reaction suspected    Negative: Rash while taking a medicine or within 3 days of stopping it    Negative: Asthma and having symptoms of asthma (cough, wheezing, etc.)    Negative: Symptom of illness (e.g., headache, abdominal pain, earache, vomiting) that are more than mild    Negative: Breastfeeding questions about mother's medicines and diet    Negative: DOUBLE DOSE (an extra dose or  lesser amount) of prescription drug and NO symptoms  (Exception: A double dose of antibiotics.)    Negative: Diabetes drug error or overdose (e.g., took wrong type of insulin or took extra dose)    Negative: MORE THAN A DOUBLE DOSE of a prescription or over-the-counter (OTC) drug    Negative: DOUBLE DOSE (an extra dose or lesser amount) of prescription drug and any symptoms (e.g., dizziness, nausea, pain, sleepiness)    Negative: DOUBLE DOSE (an extra dose or lesser amount) of over-the-counter (OTC) drug and any symptoms (e.g., dizziness, nausea, pain, sleepiness)    Negative: Took another person's prescription drug    Protocols used: MEDICATION QUESTION CALL-A-OH

## 2023-01-09 ENCOUNTER — HOSPITAL ENCOUNTER (OUTPATIENT)
Dept: MRI IMAGING | Facility: HOSPITAL | Age: 78
Discharge: HOME | End: 2023-01-09
Attending: PODIATRIST | Admitting: PODIATRIST
Payer: MEDICARE

## 2023-01-09 DIAGNOSIS — E08.621 DIABETIC ULCER OF TOE OF RIGHT FOOT ASSOCIATED WITH DIABETES MELLITUS DUE TO UNDERLYING CONDITION, WITH NECROSIS OF BONE (H): ICD-10-CM

## 2023-01-09 DIAGNOSIS — I73.9 PAD (PERIPHERAL ARTERY DISEASE) (H): ICD-10-CM

## 2023-01-09 DIAGNOSIS — L02.619 CELLULITIS AND ABSCESS OF FOOT EXCLUDING TOE: ICD-10-CM

## 2023-01-09 DIAGNOSIS — L97.514 DIABETIC ULCER OF TOE OF RIGHT FOOT ASSOCIATED WITH DIABETES MELLITUS DUE TO UNDERLYING CONDITION, WITH NECROSIS OF BONE (H): ICD-10-CM

## 2023-01-09 DIAGNOSIS — L03.119 CELLULITIS AND ABSCESS OF FOOT EXCLUDING TOE: ICD-10-CM

## 2023-01-09 LAB
BACTERIA WND CULT: ABNORMAL

## 2023-01-09 PROCEDURE — 255N000002 HC RX 255 OP 636: Performed by: PODIATRIST

## 2023-01-09 PROCEDURE — A9585 GADOBUTROL INJECTION: HCPCS | Performed by: PODIATRIST

## 2023-01-09 PROCEDURE — 73720 MRI LWR EXTREMITY W/O&W/DYE: CPT | Mod: RT,MG

## 2023-01-09 RX ORDER — GADOBUTROL 604.72 MG/ML
0.1 INJECTION INTRAVENOUS ONCE
Status: COMPLETED | OUTPATIENT
Start: 2023-01-09 | End: 2023-01-09

## 2023-01-09 RX ADMIN — GADOBUTROL 9 ML: 604.72 INJECTION INTRAVENOUS at 19:21

## 2023-01-10 ENCOUNTER — TELEPHONE (OUTPATIENT)
Dept: VASCULAR SURGERY | Facility: CLINIC | Age: 78
End: 2023-01-10

## 2023-01-10 LAB
ANION GAP SERPL CALCULATED.3IONS-SCNC: 16 MMOL/L (ref 7–15)
BUN SERPL-MCNC: 20.4 MG/DL (ref 8–23)
CALCIUM SERPL-MCNC: 9.4 MG/DL (ref 8.8–10.2)
CHLORIDE SERPL-SCNC: 107 MMOL/L (ref 98–107)
CREAT SERPL-MCNC: 1.81 MG/DL (ref 0.67–1.17)
DEPRECATED HCO3 PLAS-SCNC: 18 MMOL/L (ref 22–29)
GFR SERPL CREATININE-BSD FRML MDRD: 38 ML/MIN/1.73M2
GLUCOSE SERPL-MCNC: 77 MG/DL (ref 70–99)
POTASSIUM SERPL-SCNC: 4.9 MMOL/L (ref 3.4–5.3)
SODIUM SERPL-SCNC: 141 MMOL/L (ref 136–145)

## 2023-01-10 PROCEDURE — P9603 ONE-WAY ALLOW PRORATED MILES: HCPCS | Mod: ORL | Performed by: NURSE PRACTITIONER

## 2023-01-10 PROCEDURE — 36415 COLL VENOUS BLD VENIPUNCTURE: CPT | Mod: ORL | Performed by: NURSE PRACTITIONER

## 2023-01-10 PROCEDURE — 80048 BASIC METABOLIC PNL TOTAL CA: CPT | Mod: ORL | Performed by: NURSE PRACTITIONER

## 2023-01-10 NOTE — TELEPHONE ENCOUNTER
I left a voicemail with the patient's sister requesting a return call to review his recent MRI report on the right foot.

## 2023-01-10 NOTE — TELEPHONE ENCOUNTER
I reviewed the patient's right foot MRI with the patient's sister today: 1.  Changes of osteomyelitis involving the proximal and middle phalanges of the fourth toe and proximal, middle and distal phalanges of the fifth toe.     2.  No evidence of an abscess.     3.  Moderate degenerative changes at the first MTP joint.    Based on the above, I recommend a TMA with EDDY with hospital admission and transfer to TCU. Awaiting EVELYNE's to be completed tomorrow. I will plan to review the above with the patient at tomorrow's clinic visit.

## 2023-01-11 ENCOUNTER — TELEPHONE (OUTPATIENT)
Dept: VASCULAR SURGERY | Facility: CLINIC | Age: 78
End: 2023-01-11

## 2023-01-11 NOTE — TELEPHONE ENCOUNTER
Spoke to Noa. Patient will be seeing Dr. Spears on Friday.  Noa can see patient today and tomorrow but going forward, visits will either need to be decreased to 3 times a week or they can go daily if patient is needing a skilled dressing change.   Patient unable to change the gauze dressing on his own and does not have help at his facility.

## 2023-01-11 NOTE — TELEPHONE ENCOUNTER
"Noa with [facility] requesting a call back to discuss patient's dressings. She states what is currently ordered is not considered \"skilled\", so home care cannot continue to see patient daily. She is wondering if patient's dressing orders can be changed, or if they can decrease visits.   Patient was unable to come to appt today for EVELYNE/Regan as his taxi cancelled.   PH: [phone]  "

## 2023-01-11 NOTE — TELEPHONE ENCOUNTER
Marie from [facility] called. They are considering getting patient directly admitted to a TCU so he can receive a high level a care.     Marie is wondering, if patient is accepted at a TCU, if Dr. Spears would want to order IV antibiotics?  And if patient would benefit from different wound care orders if the nurses there are able to provide more skilled care?    Marie would like a call back at [phone]

## 2023-01-11 NOTE — TELEPHONE ENCOUNTER
Marie Curtis's sister is calling stating that the the medical transport canceled his ride today due to weather.  The next opening for both the ultrasound and OV is next Wednesday the 18.  She is wondering if [facility] should have his OV or if the ultrasound is needed prior to the OV?  Please advise and call her back.

## 2023-01-11 NOTE — TELEPHONE ENCOUNTER
Spoke to Adriano. Patient needs to have the ultrasound done in the clinic. She will call the family and tell them that if they want him to be seen this week they have to figure out how to transport him to his appointments.

## 2023-01-11 NOTE — TELEPHONE ENCOUNTER
Patient's sister, Marie, called again asking if any ultrasound times opened up for Friday before the visit that is scheduled with Dr. Spears at 1pm.  I confirmed that there were not any open times.  She then states they do not have anyone to bring the patient to the ultrasound that is scheduled for tomorrow morning and that Adriano is working on getting a ride for the appointment on Friday with Dr. Spears.  She went on to state that she didn't think the ultrasounds were necessary and that the pt should just be able to see the doctor so that he can tell him about the need to amputate his toes.  I explained that Dr. Spears is wanting the ultrasounds prior to being seen since that will help dictate how the patient is treated.  She then went on to state that maybe the patient can just get the ultrasounds done at his home and that she will have Adriano call us to get the exact information for that to see if it's possible.  I explained to her that the ultrasounds ordered are typically needing to be done in a vascular clinic, but that if Adriano calls us we would be happy to tell them what the ultrasound is called.  She ended the call without changing any appointments and said she will have Adriano call us back.

## 2023-01-11 NOTE — TELEPHONE ENCOUNTER
Called Marie osman. Per Dr. Spears, there is no need for IV antibiotics or changes to wound care currently. Patient needs to have surgery but the type of surgery is dependent upon the results of the ultrasound that is scheduled for tomorrow. After surgery, there would be changes to treatment plan.

## 2023-01-12 ENCOUNTER — ANCILLARY PROCEDURE (OUTPATIENT)
Dept: VASCULAR ULTRASOUND | Facility: CLINIC | Age: 78
End: 2023-01-12
Attending: PODIATRIST
Payer: MEDICARE

## 2023-01-12 ENCOUNTER — MEDICAL CORRESPONDENCE (OUTPATIENT)
Dept: HEALTH INFORMATION MANAGEMENT | Facility: CLINIC | Age: 78
End: 2023-01-12

## 2023-01-12 DIAGNOSIS — I73.9 PAD (PERIPHERAL ARTERY DISEASE) (H): ICD-10-CM

## 2023-01-12 PROCEDURE — 93925 LOWER EXTREMITY STUDY: CPT

## 2023-01-12 PROCEDURE — 93925 LOWER EXTREMITY STUDY: CPT | Mod: 26 | Performed by: SURGERY

## 2023-01-12 PROCEDURE — 93923 UPR/LXTR ART STDY 3+ LVLS: CPT

## 2023-01-12 PROCEDURE — 93923 UPR/LXTR ART STDY 3+ LVLS: CPT | Mod: 26 | Performed by: SURGERY

## 2023-01-13 ENCOUNTER — OFFICE VISIT (OUTPATIENT)
Dept: VASCULAR SURGERY | Facility: CLINIC | Age: 78
End: 2023-01-13
Attending: PODIATRIST
Payer: MEDICARE

## 2023-01-13 ENCOUNTER — TELEPHONE (OUTPATIENT)
Dept: VASCULAR SURGERY | Facility: CLINIC | Age: 78
End: 2023-01-13

## 2023-01-13 VITALS
DIASTOLIC BLOOD PRESSURE: 64 MMHG | TEMPERATURE: 98.3 F | SYSTOLIC BLOOD PRESSURE: 138 MMHG | RESPIRATION RATE: 18 BRPM | HEART RATE: 64 BPM

## 2023-01-13 DIAGNOSIS — E08.621 DIABETIC ULCER OF TOE OF RIGHT FOOT ASSOCIATED WITH DIABETES MELLITUS DUE TO UNDERLYING CONDITION, WITH NECROSIS OF BONE (H): Primary | ICD-10-CM

## 2023-01-13 DIAGNOSIS — L02.619 CELLULITIS AND ABSCESS OF FOOT EXCLUDING TOE: ICD-10-CM

## 2023-01-13 DIAGNOSIS — L89.623 PRESSURE ULCER OF LEFT HEEL, STAGE 3 (H): ICD-10-CM

## 2023-01-13 DIAGNOSIS — L97.514 DIABETIC ULCER OF TOE OF RIGHT FOOT ASSOCIATED WITH DIABETES MELLITUS DUE TO UNDERLYING CONDITION, WITH NECROSIS OF BONE (H): Primary | ICD-10-CM

## 2023-01-13 DIAGNOSIS — E08.621 DIABETIC ULCER OF TOE OF RIGHT FOOT ASSOCIATED WITH DIABETES MELLITUS DUE TO UNDERLYING CONDITION, LIMITED TO BREAKDOWN OF SKIN (H): ICD-10-CM

## 2023-01-13 DIAGNOSIS — L89.613 PRESSURE ULCER OF RIGHT HEEL, STAGE 3 (H): ICD-10-CM

## 2023-01-13 DIAGNOSIS — L97.511 DIABETIC ULCER OF TOE OF RIGHT FOOT ASSOCIATED WITH DIABETES MELLITUS DUE TO UNDERLYING CONDITION, LIMITED TO BREAKDOWN OF SKIN (H): ICD-10-CM

## 2023-01-13 DIAGNOSIS — L03.119 CELLULITIS AND ABSCESS OF FOOT EXCLUDING TOE: ICD-10-CM

## 2023-01-13 DIAGNOSIS — M86.9 OSTEOMYELITIS OF ANKLE AND FOOT (H): ICD-10-CM

## 2023-01-13 PROCEDURE — 11045 DBRDMT SUBQ TISS EACH ADDL: CPT | Performed by: PODIATRIST

## 2023-01-13 PROCEDURE — 11044 DBRDMT BONE 1ST 20 SQ CM/<: CPT | Performed by: PODIATRIST

## 2023-01-13 PROCEDURE — G0463 HOSPITAL OUTPT CLINIC VISIT: HCPCS | Mod: 25 | Performed by: PODIATRIST

## 2023-01-13 PROCEDURE — 11042 DBRDMT SUBQ TIS 1ST 20SQCM/<: CPT | Mod: XS | Performed by: PODIATRIST

## 2023-01-13 PROCEDURE — 97598 DBRDMT OPN WND ADDL 20CM/<: CPT | Mod: XS | Performed by: PODIATRIST

## 2023-01-13 PROCEDURE — 97598 DBRDMT OPN WND ADDL 20CM/<: CPT | Performed by: PODIATRIST

## 2023-01-13 PROCEDURE — 97597 DBRDMT OPN WND 1ST 20 CM/<: CPT | Mod: XS | Performed by: PODIATRIST

## 2023-01-13 PROCEDURE — 99213 OFFICE O/P EST LOW 20 MIN: CPT | Mod: 25 | Performed by: PODIATRIST

## 2023-01-13 RX ORDER — SULFAMETHOXAZOLE/TRIMETHOPRIM 800-160 MG
1 TABLET ORAL 2 TIMES DAILY
Qty: 20 TABLET | Refills: 0 | Status: SHIPPED | OUTPATIENT
Start: 2023-01-13 | End: 2023-03-08

## 2023-01-13 ASSESSMENT — PAIN SCALES - GENERAL: PAINLEVEL: MILD PAIN (2)

## 2023-01-13 NOTE — PROGRESS NOTES
FOOT AND ANKLE SURGERY/PODIATRY Progress Note      ASSESSMENT:   Diabetic Ulcerations digits 2-5 right foot  Stage 3 Pressure Ulcerations bilateral heels   Ulceration left leg  Osteomyelitis right foot  DM2  PAD        TREATMENT:  -I discussed with the patient and his sister Marie that there is non-viable tissue along the lateral 4th digit right foot with exposed bone, and necrotic tissue along the lateral 5th digit and 5th MPJ. Remaining ulcers on 2nd and 3rd digits right foot are stable. New ulcers along the bilateral heels, due to excessive pressure.     MRI right foot: 1.  Changes of osteomyelitis involving the proximal and middle phalanges of the fourth toe and proximal, middle and distal phalanges of the fifth toe.     2.  No evidence of an abscess.     3.  Moderate degenerative changes at the first MTP joint.     -Based on the above, I discussed with the patient and his sister Marie today that I recommend a transmetatarsal amputation with achilles tendon lengthening. However, recent EVELYNE's indicate poor perfusion to the right foot. I am unable to proceed with foot surgery until he has been seen and evaluated by vascular surgery.     -Referred to vascular surgery for consultation.      -I will extend Bactrim.      -After discussion of risk factors and consent obtained 2% Lidocaine HCL jelly was applied, under clean conditions, the 4th digit right foot ulceration(s) were debrided using .bone.  Devitalized and nonviable tissue, along with any fibrin and slough, was removed to improve granulation tissue formation, stimulate wound healing, decrease overall bacteria load, disrupt biofilm formation and decrease edge senescence. Wound drainage was scant No. Total excisional debridement was 4 sq cm bone with a depth of 0.5 cm.   Ulcers were improved afterwards and .  Measures were as noted on the flow sheet. A gauze dressing was applied. He will continue to apply a gauze dressing qday.     -After  discussion of risk factors and consent obtained 2% Lidocaine HCL jelly was applied, under clean conditions, the 2nd digit ulceration(s) were debrided using .into the subcutaneous tissue.  Devitalized and nonviable tissue, along with any fibrin and slough, was removed to improve granulation tissue formation, stimulate wound healing, decrease overall bacteria load, disrupt biofilm formation and decrease edge senescence. Wound drainage was scant No. Total excisional debridement was 25 sq cm into the subcutaneous tissue with a depth of 0.2 cm.   Ulcers were improved afterwards and .  Measures were as noted on the flow sheet. A gauze dressing was applied. He will continue to apply a gauze dressing qday.     -After discussion of risk factors and consent obtained 2% Lidocaine HCL jelly was applied, under clean conditions, the 3rd digit right foot, bilateral heel and left leg ulceration(s) were debrided using .from the epidermis/dermis area.  Devitalized and nonviable tissue, along with any fibrin and slough, was removed to improve granulation tissue formation, stimulate wound healing, decrease overall bacteria load, disrupt biofilm formation and decrease edge senescence. Wound drainage was scant No. Total excisional debridement was 138 sq cm from the epidermis/dermis area with a depth of 0.1 cm.   Ulcers were improved afterwards and .  Measures were as noted on the flow sheet. A gauze dressing was applied. He will continue to apply a gauze dressing qday.     -He will follow-up with me after vascular surgery consultation and/or intervention.      [facility]    [facility]      HPI: Ever Crane was seen again today for bilateral lower extremity ulcers. His sister Marie is present for today's exam. The patient has completed an MRI and EVELYNE's and would like to review the results today.       Past Medical History:   Diagnosis  Date     A-fib (H)      MESHA (acute kidney injury) (H)      Atopic keratoconjunctivitis      Atrial fibrillation (H)     Brian Moe: 8/2011 Cardioversion; CHADS2 VASC = 5; he is on warfarin and sotalol      Atrial flutter (H)      Mcgarry's esophagus 1/1/2012    per note of Dr. Tavo Mike of [facility]     Bilateral lower leg cellulitis 8/31/2018     Candidiasis of perineum 1/3/2018     Carotid stenosis, asymptomatic, right      CHF (congestive heart failure) (H)      Cholecystitis, acute 8/18/2019     Coronary artery disease due to lipid rich plaque 2000    CABG x2     Diabetes (H)      Diabetic ulcer of both feet (H) 10/31/2017     Dyslexia      Dyslipidemia, goal LDL below 70 2000     Epistaxis      Essential hypertension      Gangrene of left foot (H)      GERD (gastroesophageal reflux disease)      HLD (hyperlipidemia)      HTN (hypertension)      Hyponatremia      MRSA (methicillin resistant Staphylococcus aureus)      Neuropathy      Non-STEMI (non-ST elevated myocardial infarction) (H)      Other atopic dermatitis      Peripheral vascular disease (H)      Pneumonia 6/26/2018     Type 2 diabetes mellitus, without long-term current use of insulin (H)      Unable to function independently 11/13/2017       Past Surgical History:   Procedure Laterality Date     AMPUTATE FOOT Left 11/05/2017    Procedure: LEFT TRANSMETATARSAL AMPUTATION;  Surgeon: Ever Wick MD;  Location: [facility] OR;  Service:      BYPASS GRAFT ARTERY CORONARY N/A 03/06/2000    SVG to OM1, SVG to PDA     BYPASS GRAFT ARTERY CORONARY N/A 10/04/2018    redo CABG due to graft failure     CABG MEASURES GRP       CARDIOVERSION  08/25/2011     CV CORONARY ANGIOGRAM N/A 10/01/2018    Procedure: Coronary Angiogram;  Surgeon: Miki Mac MD;  Location: [facility];  Service:      INGUINAL HERNIA REPAIR Bilateral 1969    and 1979     IR EXTREMITY ANGIOGRAM BILATERAL  11/3/2017     LAPAROSCOPIC CHOLECYSTECTOMY N/A 08/18/2019     Procedure: CHOLECYSTECTOMY, LAPAROSCOPIC;  Surgeon: Stewart Fountain MD;  Location: [facility];  Service: General       Allergies   Allergen Reactions     Doxycycline      Other reaction(s): rash     Penicillin V      Other reaction(s): Unknown     Latex Rash     Penicillins Rash     When he was a child  Tolerated Zosyn 6/2018, also tolerated Augmentin?  Childhood rxn  -- tolerated cefazolin 10/4/18         Current Outpatient Medications:      acetaminophen (TYLENOL) 325 MG tablet, Take 650 mg by mouth every 6 hours, Disp: , Rfl:      albuterol (2.5 MG/3ML) 0.083% neb solution, Take 2.5 mg by nebulization every 6 hours as needed, Disp: , Rfl:      albuterol (PROAIR HFA/PROVENTIL HFA/VENTOLIN HFA) 108 (90 Base) MCG/ACT inhaler, Inhale 2 puffs into the lungs 2 times daily And q4h PRN, Disp: , Rfl:      alum & mag hydroxide-simethicone (MAALOX) 200-200-20 MG/5ML SUSP suspension, Take 30 mLs by mouth every 4 hours as needed for indigestion, Disp: , Rfl:      bisacodyl (DULCOLAX) 5 MG EC tablet, Take 5 mg by mouth daily as needed for constipation, Disp: , Rfl:      calcium polycarbophil (FIBERCON) 625 MG tablet, Take 1 tablet by mouth daily, Disp: , Rfl:      celecoxib (CELEBREX) 200 MG capsule, Take 200 mg by mouth daily, Disp: , Rfl:      cetirizine (ZYRTEC) 10 MG tablet, Take 10 mg by mouth daily, Disp: , Rfl:      cyanocobalamin (VITAMIN B-12) 2500 MCG SUBL sublingual tablet, Place 2,500 mcg under the tongue daily, Disp: , Rfl:      glipiZIDE (GLUCOTROL) 10 MG tablet, Take 1 tablet (10 mg) by mouth every morning (before breakfast), Disp: 30 tablet, Rfl: 0     glipiZIDE (GLUCOTROL) 5 MG tablet, Take 3 tablets (15 mg) by mouth every evening, Disp: 30 tablet, Rfl: 0     guaiFENesin (MUCINEX) 600 MG 12 hr tablet, Take 600 mg by mouth 2 times daily, Disp: , Rfl:      insulin glargine (LANTUS PEN) 100 UNIT/ML pen, Inject 26 Units Subcutaneous At Bedtime, Disp: , Rfl:      ipratropium (ATROVENT) 0.06 % nasal  spray, Spray 1 spray into both nostrils 4 times daily as needed for rhinitis, Disp: , Rfl:      ketoconazole (NIZORAL) 2 % external shampoo, Apply topically twice a week Mon and Fri., Disp: , Rfl:      liraglutide (VICTOZA) 18 MG/3ML solution, Inject 0.6 mg Subcutaneous daily, Disp: , Rfl:      mineral oil-hydrophilic petrolatum (AQUAPHOR) external ointment, Apply topically 2 times daily, Disp: , Rfl:      nitroGLYcerin (NITROSTAT) 0.4 MG sublingual tablet, Place 0.4 mg under the tongue every 5 minutes as needed, Disp: , Rfl:      omeprazole (PRILOSEC) 20 MG CR capsule, Take 20 mg by mouth daily, Disp: , Rfl:      polyethylene glycol (MIRALAX) 17 g packet, Take 1 packet by mouth daily as needed for constipation, Disp: , Rfl:      rivaroxaban ANTICOAGULANT (XARELTO) 15 MG TABS tablet, Take by mouth daily (with dinner), Disp: , Rfl:      senna-docusate (SENOKOT-S/PERICOLACE) 8.6-50 MG tablet, Take 1 tablet by mouth 2 times daily And BID PRN, Disp: , Rfl:      simvastatin (ZOCOR) 40 MG tablet, Take 40 mg by mouth At Bedtime, Disp: , Rfl:      spironolactone (ALDACTONE) 25 MG tablet, Take 25 mg by mouth daily, Disp: , Rfl:      sulfamethoxazole-trimethoprim (BACTRIM DS) 800-160 MG tablet, Take 1 tablet by mouth 2 times daily, Disp: 20 tablet, Rfl: 0     triamcinolone (KENALOG) 0.1 % cream, Apply 1 Application topically daily, Disp: , Rfl:      urea (CARMOL) 10 % cream, Apply 1 Application topically daily as needed, Disp: , Rfl:     Review of Systems - 10 point Review of Systems is negative except for foot ulcers which is noted in HPI.      OBJECTIVE:  /64   Pulse 64   Temp 98.3  F (36.8  C)   Resp 18   General appearance: Patient is alert and fully cooperative with history & exam.  No sign of distress is noted during the visit.    Vascular: Dorsalis pedis non-palpableBilateral.  Dermatologic:    VASC Wound right foot 2nd toe (Active)   Pre Size Length 3 01/13/23 1256   Pre Size Width 2 01/13/23 1256   Pre Size  Depth 0.1 01/13/23 1256   Pre Total Sq cm 6 01/13/23 1256       VASC Wound right foot 3rd toe (Active)   Pre Size Length 1.6 01/13/23 1256   Pre Size Width 1.2 01/13/23 1256   Pre Size Depth 0.1 01/05/23 1100   Pre Total Sq cm 1.92 01/13/23 1256       VASC Wound right foot btween 3rd and 4th toe (Active)   Pre Size Length 0.5 01/13/23 1256   Pre Size Width 0.7 01/13/23 1256   Pre Size Depth 0.1 01/13/23 1256   Pre Total Sq cm 0.35 01/13/23 1256       VASC Wound right foot 4th toe (Active)   Pre Size Length 0.8 01/13/23 1256   Pre Size Width 0.3 01/13/23 1256   Pre Size Depth 0.1 01/13/23 1256   Pre Total Sq cm 4 01/05/23 1100       VASC Wound right foot 5th toe (Active)   Pre Size Length 1.7 01/13/23 1256   Pre Size Width 1.3 01/13/23 1256   Pre Size Depth 0.2 01/13/23 1256   Pre Total Sq cm 2.21 01/13/23 1256       VASC Wound Right lateral foot (Active)   Pre Size Length 1.8 01/13/23 1256   Pre Size Width 2.2 01/13/23 1256   Pre Size Depth 0.1 01/13/23 1256   Pre Total Sq cm 3.96 01/13/23 1256       VASC Wound Right foot between 4th and 5th toe (Active)   Pre Size Length 1.4 01/13/23 1256   Pre Size Width 4 01/13/23 1256   Pre Size Depth 0.5 01/13/23 1256   Pre Total Sq cm 5.6 01/13/23 1256       VASC Wound Left lef distal (Active)   Pre Size Length 0.7 01/13/23 1256   Pre Size Width 0.6 01/13/23 1256   Pre Size Depth 0.2 01/13/23 1256   Pre Total Sq cm 0.42 01/13/23 1256       VASC Wound left leg proximal (Active)   Pre Size Length 7.7 01/13/23 1256   Pre Size Width 2.8 01/13/23 1256   Pre Size Depth 0.2 01/13/23 1256   Pre Total Sq cm 21.56 01/13/23 1256       VASC Wound Right heel (Active)   Post Size Length 9 01/13/23 1300   Post Size Width 9 01/13/23 1300   Post Size Depth 0.1 01/13/23 1300   Post Total Sq cm 81 01/13/23 1300       VASC Wound Left heel (Active)   Post Size Length 7 01/13/23 1300   Post Size Width 6 01/13/23 1300   Post Size Depth 0.1 01/13/23 1300   Post Total Sq cm 42 01/13/23 1300    Ulcerations digits 2,3 right foot has granular tissue. Non-viable tissue along lateral 4th digit right foot with exposed bone. Necrotic tissue along the lateral 5th digit and 5th MPJ right foot. Granular tissue bilateral heels and left leg ulcers.   Neurologic: Diminished to light touch Bilateral.  Musculoskeletal: Contracted digits noted right foot.     Imaging:     US Low Ext Arterial Dop Seg Pres w/o Exercise    Result Date: 1/13/2023  Table formatting from the original result was not included. BILATERAL RESTING ANKLE-BRACHIAL INDICES (EVELYNE'S) (Date: 01/12/23) Indication:  Ulcerations digits 2-5 right foot and Osteomyelitis right foot Previous: 12/20/18 History: Previous Smoker, Hypertension, Diabetic, Hyperlipidemia, CAD, MI, Skin Color Change and Vascular Ulcers  Resting EVELYNE's          Right: mmHg Index     Brachial: 125  Ankle-(PT): 97 0.71 Ankle-(DP): 127 0.93          Digit: 33 0.24               Left: mmHg Index     Brachial: 136  Ankle-(PT): 122 0.90 Ankle-(DP): 85 0.63          Digit: TMA - Resting ankle-brachial index of 0.93 on the right. Toe Pressures of 33 mmHg and TBI of 0.24 Resting ankle-brachial index of 0.90 on the left. Toe Pressures of TMA mmHg and TBI of -  VPR WAVEFORMS: The right volume plethysmography waveforms are mildly abnormal at the lower thigh level, mildly abnormal at the upper calf level and mildly abnormal at the ankle. The left volume plethysmography waveforms are mildly abnormal at the lower thigh level, mildly abnormal at the upper calf level and mildly abnormal at the ankle.  Impression:  1. RIGHT LOWER EXTREMITY: EVELYNE is Normal with multiphasic waveforms with an EVELYNE of 0.93. Toe Pressures are Abnormal and impaired for wound healing with toe pressures of 33 mmHg. 2. LEFT LOWER EXTREMITY: EVELYNE is Normal with multiphasic waveforms with an EVELYNE of 0.90. Toe Pressures Cannot be obtained due to history of TMA Reference: Wound classification Grade EVELYNE Ankle Systolic Pressure Toe  Pressures 0 > 0.80 > 100 mmHg > 60 mmHg 1 0.6 - 0.79 70 - 100 mmHg 40 - 59 mmHg 2 0.4 - 0.59 50-70 mmHg 30 - 39 mmHg 3 < 0.39 < 50 mmHg < 30 mmHg Digit Pressures DBI Disease Category > 0.70 Normal < 0.70 Abnormal > 30 mmHg Potential wound healing < 30 mmHg Impaired wound healing Ankle Brachial Pressures EVELYNE Disease Category > 1.3  Likely vessel calcification with monophasic waveforms, non-diagnostic 0.95-1.30 Normal with multiphasic waveforms 0.50-0.95 Single level disease 0.30-0.50 Multilevel disease < 0.30 Critical limb ischema Volume Plethysmography Recording (VPR) at all levels Normal Sharp systolic peak, fast upstroke, prominent dicrotic notch in wave Mild Sharp systolic peak, fast upstroke, absent dicrotic notch in wave Moderate Flattened systolic peak, slowed upstroke, absent dicrotic notch inwave Severe amplitude wave with = upslope and down slope Occluded Flat Line     MR Foot Right w/o & w Contrast    Result Date: 1/9/2023  EXAM: MR FOOT RIGHT W/O and W CONTRAST LOCATION: [facility] DATE/TIME: 1/9/2023 7:27 PM INDICATION: Osteomyelitis digits 3,4,5 right foot. Foot pain. COMPARISON: None. TECHNIQUE: Routine. Additional postgadolinium T1 sequences were obtained. IV CONTRAST: Gadavist 9 mL FINDINGS: JOINTS AND BONES: -No fracture or contusion. There are changes of osteomyelitis involving the entire middle phalanx and majority of the proximal phalanx of the fourth toe as best seen on series 7 image 21 and at the fifth toe there are changes of osteomyelitis involving the middle and distal phalanges, as well as a distal portion of the proximal phalanx. Moderate degenerative changes at the first MTP joint. No effusion. TENDONS: -No tendon tear, tendinopathy, or tenosynovitis. LIGAMENTS: -Lisfranc ligament: Intact. No subluxation. MUSCLES AND SOFT TISSUES: -No abscess. Marked global atrophy of the intrinsic musculature of the foot.     IMPRESSION: 1.  Changes of osteomyelitis involving  the proximal and middle phalanges of the fourth toe and proximal, middle and distal phalanges of the fifth toe. 2.  No evidence of an abscess. 3.  Moderate degenerative changes at the first MTP joint.    US Lower Extremity Arterial Duplex Bilateral    Result Date: 1/13/2023  Table formatting from the original result was not included. Arterial Duplex Ultrasound (Date: 01/12/23) Lower Extremity Artery Evaluation Indication:  Ulcerations digits 2-5 right foot and Osteomyelitis right foot Previous: 11/3/17 History: Previous Smoker, Hypertension, Diabetic, Hyperlipidemia, CAD, MI, Skin Color Change and Vascular Ulcers Technique: Duplex imaging is performed utilizing gray-scale, two-dimensional images, and color-flow imaging. Doppler waveform analysis and spectral Doppler imaging is also performed. LOWER EXTREMITY ARTERIAL DUPLEX EXAM WITH WAVEFORMS Right Leg:(cm/s) Location: Velocities Waveforms EIA:   141  T CFA:   136  T PFA:   170  T SFA Proximal:   121  T SFA Mid:   136  T SFA Distal:   118  T Popliteal Artery:   91  M PTA:   21   M PRICE:   88  M DPA:   103  M Waveforms: T=Triphasic, M=Monophasic, B=Biphasic Left Leg:(cm/s) Location: Velocities Waveforms EIA:   144  T CFA:   118  T PFA:   104  T SFA Proximal:   131  B SFA Mid:   114  T SFA Distal:   129  T Popliteal Artery:   73  B PTA:   70   M PRICE:   31  M DPA:   80  M Waveforms: T=Triphasic, M=Monophasic, B=Biphasic Impression: Right Lower Extremity: Triphasic flow in femoral artery suggesting no inflow disease.  Transition to monophasic flow in the popliteal artery without hemodynamic significance. Left Lower Extremity: Triphasic flow in common femoral artery suggesting no inflow disease.  Transition to monophasic flow in popliteal artery without hemodynamic significance Reference: Category Normal 1-19% 20-49% 50-99% Occluded PSV <160 cm/sec without spectral broadening <160 cm/sec with spectral broadening Increased Increased Absent Flow Ratio N/A N/A < 2.0 >2.0  N/A Post-Stenotic Turbulence No No No Yes N/A          Picture:

## 2023-01-13 NOTE — TELEPHONE ENCOUNTER
Frida at [facility] would like a call back to discuss pt's visit with Dr. Spears and clarify orders.      She can be reached at [phone]

## 2023-01-13 NOTE — PATIENT INSTRUCTIONS
"Dressing Change lnstructions  NO COMPRESSION BILATERALLY AT THIS  TIME        Right foot:  Apply dry gauze to right foot. Change daily/.    Left leg:           3 TIMES PER WEEK and as needed, Cleanse your leg wound(s) with Normal saline.    Pat Dry with non-sterile gauze    Primary Dressing: Apply  Hydofera blue  into/onto the wounds    Secondary dressing: Cover with dry gauze    Secure with non-sterile roll gauze (4\" x 75\" roll) and tape (1\" roll tape) as needed; avoid adhesive directly on the skin    Bilateral Heels:    - Dressing Application of  Endoform    1. Endoform should be cut to the size of the wound.  It should touch the edges of the ulcer. It is okay if it overlap the edges a small amount.  Then, moisten the Endoform with saline.(If it is easier for you, you can moisten it before laying it in the wound)    2. Cover the wound with Endoform, followed by dry gauze, and secure with roll gauze if needed.     3. Endoform is naturally used by the body over time so you may not find it in the wound when you change your dressing.  If you do find some, gently cleanse the wound with saline. Do not remove the remaining Endoform, which may appear as an off-white to max gel, just add Endoform on top.  Usually, more Endoform will need to be added every 5-7 days.     4. Change your top dressing every 1-2 days or as needed depending on drainage.    -Endoform is a collagen dressing created from ovine (sheep) fore-stomach tissue. The collagen extracellular matrix transforms into a soft conforming sheet, which is naturally incorporated into the wound over time.  Collagen dressings are used to stimulate wound healing.   ,         It IS NOT ok to get your wound wet in the bath or shower    SEEK MEDICAL CARE IF:  You have an increase in swelling, pain, or redness around the wound.  You have an increase in the amount of pus coming from the wound.  There is a bad smell coming from the wound.  The wound appears to be " worsening/enlarging  You have a fever greater than 101.5 F      It is ok to continue current wound care treatment/products for the next 2-3 days until new wound care supplies are ordered and arrive. If longer than this please contact our office at [phone].        We want to hear from you!   In the next few weeks, you should receive a call or email to complete a survey about your visit at [facility]. Please help us improve your appointment experience by letting us know how we did today. We strive to make your experience good and value any ways in which we could do better.      We value your input and suggestions.    Thank you for choosing the [facility]!

## 2023-01-13 NOTE — TELEPHONE ENCOUNTER
Writer discussed Jacinta's questions. Went over wound care orders. She is trying to get a TCU for the patient. She requested Bactrim order from today be sent to [facility]. Writer called in [facility].

## 2023-01-17 ENCOUNTER — HOSPITAL ENCOUNTER (EMERGENCY)
Facility: HOSPITAL | Age: 78
Discharge: HOME | End: 2023-01-17
Attending: EMERGENCY MEDICINE | Admitting: EMERGENCY MEDICINE
Payer: MEDICARE

## 2023-01-17 ENCOUNTER — MEDICAL CORRESPONDENCE (OUTPATIENT)
Dept: HEALTH INFORMATION MANAGEMENT | Facility: CLINIC | Age: 78
End: 2023-01-17

## 2023-01-17 VITALS
TEMPERATURE: 98.1 F | DIASTOLIC BLOOD PRESSURE: 73 MMHG | OXYGEN SATURATION: 97 % | HEART RATE: 64 BPM | RESPIRATION RATE: 18 BRPM | SYSTOLIC BLOOD PRESSURE: 157 MMHG

## 2023-01-17 DIAGNOSIS — M86.9 OSTEOMYELITIS OF RIGHT FOOT, UNSPECIFIED TYPE (H): ICD-10-CM

## 2023-01-17 DIAGNOSIS — L97.509 DIABETIC FOOT ULCER ASSOCIATED WITH TYPE 2 DIABETES MELLITUS, UNSPECIFIED LATERALITY, UNSPECIFIED PART OF FOOT, UNSPECIFIED ULCER STAGE (H): ICD-10-CM

## 2023-01-17 DIAGNOSIS — E11.621 DIABETIC FOOT ULCER ASSOCIATED WITH TYPE 2 DIABETES MELLITUS, UNSPECIFIED LATERALITY, UNSPECIFIED PART OF FOOT, UNSPECIFIED ULCER STAGE (H): ICD-10-CM

## 2023-01-17 PROBLEM — I25.728 CORONARY ARTERY DISEASE OF AUTOLOGOUS BYPASS GRAFT WITH STABLE ANGINA PECTORIS (H): Status: ACTIVE | Noted: 2022-03-14

## 2023-01-17 PROBLEM — J44.9 CHRONIC OBSTRUCTIVE PULMONARY DISEASE, UNSPECIFIED (H): Status: ACTIVE | Noted: 2022-05-17

## 2023-01-17 PROBLEM — Z95.1 PRESENCE OF AORTOCORONARY BYPASS GRAFT: Status: ACTIVE | Noted: 2019-10-14

## 2023-01-17 PROBLEM — G31.84 MILD COGNITIVE IMPAIRMENT: Status: ACTIVE | Noted: 2019-10-14

## 2023-01-17 PROBLEM — S81.802A OPEN WOUND OF BOTH LEGS WITH COMPLICATION: Status: ACTIVE | Noted: 2022-12-28

## 2023-01-17 PROBLEM — K59.00 CONSTIPATION, UNSPECIFIED: Status: ACTIVE | Noted: 2021-10-18

## 2023-01-17 PROBLEM — M19.90 OSTEOARTHROSIS: Status: ACTIVE | Noted: 2022-05-09

## 2023-01-17 PROBLEM — Z20.822 CONTACT WITH AND (SUSPECTED) EXPOSURE TO COVID-19: Status: ACTIVE | Noted: 2021-12-13

## 2023-01-17 PROBLEM — K81.9 CHOLECYSTITIS: Status: ACTIVE | Noted: 2019-10-14

## 2023-01-17 PROBLEM — G63 NEUROPATHY DUE TO MEDICAL CONDITION (H): Status: ACTIVE | Noted: 2022-05-09

## 2023-01-17 PROBLEM — I48.91 HYPERCOAGULABLE STATE DUE TO ATRIAL FIBRILLATION (H): Status: ACTIVE | Noted: 2023-01-12

## 2023-01-17 PROBLEM — R19.8 RECTAL DISCHARGE: Status: ACTIVE | Noted: 2022-10-10

## 2023-01-17 PROBLEM — I89.0 LYMPHEDEMA, NOT ELSEWHERE CLASSIFIED: Status: ACTIVE | Noted: 2022-05-17

## 2023-01-17 PROBLEM — S81.819A SKIN TEAR OF LOWER LEG WITHOUT COMPLICATION: Status: ACTIVE | Noted: 2022-05-09

## 2023-01-17 PROBLEM — S81.801A OPEN WOUND OF BOTH LEGS WITH COMPLICATION: Status: ACTIVE | Noted: 2022-12-28

## 2023-01-17 PROBLEM — D62 ACUTE POSTHEMORRHAGIC ANEMIA: Status: ACTIVE | Noted: 2022-05-17

## 2023-01-17 PROBLEM — D69.2 SENILE PURPURA (H): Status: ACTIVE | Noted: 2022-02-14

## 2023-01-17 PROBLEM — S81.802A UNSPECIFIED OPEN WOUND, LEFT LOWER LEG, INITIAL ENCOUNTER: Status: ACTIVE | Noted: 2019-10-14

## 2023-01-17 PROBLEM — I65.21 OCCLUSION AND STENOSIS OF RIGHT CAROTID ARTERY: Status: ACTIVE | Noted: 2022-05-17

## 2023-01-17 PROBLEM — E78.2 DM TYPE 2 WITH DIABETIC MIXED HYPERLIPIDEMIA (H): Status: ACTIVE | Noted: 2022-12-06

## 2023-01-17 PROBLEM — Z89.439: Status: ACTIVE | Noted: 2022-04-11

## 2023-01-17 PROBLEM — Z86.79 PERSONAL HISTORY OF OTHER DISEASES OF THE CIRCULATORY SYSTEM: Status: ACTIVE | Noted: 2022-05-17

## 2023-01-17 PROBLEM — R10.32 LEFT GROIN PAIN: Status: ACTIVE | Noted: 2022-02-28

## 2023-01-17 PROBLEM — S72.009A HIP FRACTURE (H): Status: ACTIVE | Noted: 2022-06-27

## 2023-01-17 PROBLEM — I87.2 VENOUS INSUFFICIENCY (CHRONIC) (PERIPHERAL): Status: ACTIVE | Noted: 2022-05-17

## 2023-01-17 PROBLEM — N40.0 BPH (BENIGN PROSTATIC HYPERPLASIA): Status: ACTIVE | Noted: 2022-12-06

## 2023-01-17 PROBLEM — E11.69 DM TYPE 2 WITH DIABETIC MIXED HYPERLIPIDEMIA (H): Status: ACTIVE | Noted: 2022-12-06

## 2023-01-17 PROBLEM — A04.72 CLOSTRIDIUM DIFFICILE COLITIS: Status: ACTIVE | Noted: 2019-10-14

## 2023-01-17 PROBLEM — D68.69 HYPERCOAGULABLE STATE DUE TO ATRIAL FIBRILLATION (H): Status: ACTIVE | Noted: 2023-01-12

## 2023-01-17 PROBLEM — R19.7 DIARRHEA: Status: ACTIVE | Noted: 2022-12-06

## 2023-01-17 PROBLEM — L65.9 HAIR LOSS: Status: ACTIVE | Noted: 2021-10-18

## 2023-01-17 PROBLEM — Z79.84 LONG TERM (CURRENT) USE OF ORAL HYPOGLYCEMIC DRUGS: Status: ACTIVE | Noted: 2021-05-03

## 2023-01-17 LAB
ANION GAP SERPL CALCULATED.3IONS-SCNC: 12 MMOL/L (ref 7–15)
BUN SERPL-MCNC: 19.5 MG/DL (ref 8–23)
CALCIUM SERPL-MCNC: 9.6 MG/DL (ref 8.8–10.2)
CHLORIDE SERPL-SCNC: 102 MMOL/L (ref 98–107)
CREAT SERPL-MCNC: 1.61 MG/DL (ref 0.67–1.17)
CRP SERPL-MCNC: 92.7 MG/L
DEPRECATED HCO3 PLAS-SCNC: 21 MMOL/L (ref 22–29)
ERYTHROCYTE [DISTWIDTH] IN BLOOD BY AUTOMATED COUNT: 13.6 % (ref 10–15)
ERYTHROCYTE [SEDIMENTATION RATE] IN BLOOD BY WESTERGREN METHOD: 48 MM/HR (ref 0–15)
GFR SERPL CREATININE-BSD FRML MDRD: 44 ML/MIN/1.73M2
GLUCOSE SERPL-MCNC: 75 MG/DL (ref 70–99)
HCT VFR BLD AUTO: 35.9 % (ref 40–53)
HGB BLD-MCNC: 11.4 G/DL (ref 13.3–17.7)
LACTATE SERPL-SCNC: 1.6 MMOL/L (ref 0.7–2)
MCH RBC QN AUTO: 27.9 PG (ref 26.5–33)
MCHC RBC AUTO-ENTMCNC: 31.8 G/DL (ref 31.5–36.5)
MCV RBC AUTO: 88 FL (ref 78–100)
PLATELET # BLD AUTO: 164 10E3/UL (ref 150–450)
POTASSIUM SERPL-SCNC: 4.7 MMOL/L (ref 3.4–5.3)
RBC # BLD AUTO: 4.08 10E6/UL (ref 4.4–5.9)
SODIUM SERPL-SCNC: 135 MMOL/L (ref 136–145)
WBC # BLD AUTO: 11.2 10E3/UL (ref 4–11)

## 2023-01-17 PROCEDURE — 99283 EMERGENCY DEPT VISIT LOW MDM: CPT

## 2023-01-17 PROCEDURE — 83605 ASSAY OF LACTIC ACID: CPT | Performed by: EMERGENCY MEDICINE

## 2023-01-17 PROCEDURE — 80048 BASIC METABOLIC PNL TOTAL CA: CPT | Performed by: EMERGENCY MEDICINE

## 2023-01-17 PROCEDURE — 999N000198 HC STATISTIC WOC PT EDUCATION, 16-30 MIN

## 2023-01-17 PROCEDURE — 85018 HEMOGLOBIN: CPT | Performed by: EMERGENCY MEDICINE

## 2023-01-17 PROCEDURE — 85652 RBC SED RATE AUTOMATED: CPT | Performed by: EMERGENCY MEDICINE

## 2023-01-17 PROCEDURE — 86140 C-REACTIVE PROTEIN: CPT | Performed by: EMERGENCY MEDICINE

## 2023-01-17 PROCEDURE — 36415 COLL VENOUS BLD VENIPUNCTURE: CPT | Performed by: EMERGENCY MEDICINE

## 2023-01-17 ASSESSMENT — ACTIVITIES OF DAILY LIVING (ADL)
ADLS_ACUITY_SCORE: 35
ADLS_ACUITY_SCORE: 35

## 2023-01-17 NOTE — CONSULTS
WOC Note    Patient in ED - nursing wondering about products - hydrofera blue and Endoform for patient. These products are not present on the shelf at [facility] and patient is discharging later today.  [employer] ordered for nursing staff to place on wounds today. Wound orders for today entered as well.  OP plan of care from 1/13/23 podiatry visit reviewed and placed on AVS for nursing to continue those at patient's receiving facility.  Discussed with care management in ED and she communicated with ED nursing staff.    Gini Sandhu RN CWOCN

## 2023-01-17 NOTE — ED NOTES
Bed: [facility]ED[facility]-F  Expected date: 1/17/23  Expected time: 11:32 AM  Means of arrival: Ambulance  Comments:  [facility] - wounds

## 2023-01-17 NOTE — PROGRESS NOTES
[facility] GERIATRICS  Primary Care Provider & Clinic: [facility], [address] [place] [id number]  Chief Complaint   Patient presents with     [facility]/Count includes the [facility] ED 1/17/2023 (4 hours)     [place] Medical Record Number: [id number]  Place of Service Where Encounter Took Place: [facility] (TCU) [[id number]]    Ever Crane is a 77 year old (1945), admitted to the above facility from [facility]. Emergency department stay 1/17/2023.  Patient's living condition: lives in an assisted living facility    HPI:    Brief Summary of Hospital Course: patient was to be directly admitted to the tcu for wound care and therapy from his assisted living but due to concern over increase in redness to his wounds he was seen in the ER yesterday. He follows with Dr. spears and patient is discussing amputation.   MEDICATION CHANGES: none  RECOMMENDED FOLLOW UP: vascular surgeon.   Updates on Status Since Skilled nursing Admission: none    Today: patient states he is at [facility] for wound care. Doesn't think he needs therapy. He reports constipation currently. But alternates between constipation and diarrhea at baseline.   External notes reviewed: Facility EHR including progress notes, vital signs. Adriano note from 1/17, and ER note from 1/17. And Dr. Spears's note from 1/13.   Tests/results reviewed: recent labs reviewed none  HPI/ROS reviewed with Independent historian: no concerns  HPI/ROS reviewed with other qualified health care personal: Nurse reports no concerns    CODE STATUS/ADVANCE DIRECTIVES DISCUSSION: Full    ALLERGIES:   Allergies   Allergen Reactions     Doxycycline Rash     Latex Rash     Penicillin V Rash     Reaction occurred as a child. Patient tolerated Zosyn 6/2018, Cefazolin 10/2018, and has also tolerated Augmentin.     Penicillins Rash     Reaction occurred as a child. Patient tolerated Zosyn 6/2018, Cefazolin 10/2018, and has  also tolerated Augmentin.      PAST MEDICAL HISTORY:   Past Medical History:   Diagnosis Date     A-fib (H)      MESHA (acute kidney injury) (H)      Atopic keratoconjunctivitis      Atrial fibrillation (H)     Brian Moe: 8/2011 Cardioversion; CHADS2 VASC = 5; he is on warfarin and sotalol      Atrial flutter (H)      Mcgarry's esophagus 1/1/2012    per [facility] of [facility] Tavo [facility]     Bilateral lower leg cellulitis 8/31/2018     Candidiasis of perineum 1/3/2018     Carotid stenosis, asymptomatic, right      CHF (congestive heart failure) (H)      Cholecystitis, acute 8/18/2019     Closed nondisplaced intertrochanteric fracture of left femur with routine healing, subsequent encounter 5/18/2022     Coronary artery disease due to lipid rich plaque 2000    CABG x2     Diabetes (H)      Diabetic ulcer of both feet (H) 10/31/2017     Dyslexia      Dyslipidemia, goal LDL below 70 2000     Epistaxis      Essential hypertension      Gangrene of left foot (H)      GERD (gastroesophageal reflux disease)      HLD (hyperlipidemia)      HTN (hypertension)      Hyponatremia      MRSA (methicillin resistant Staphylococcus aureus)      Neuropathy      Non-STEMI (non-ST elevated myocardial infarction) (H)      Other atopic dermatitis      Peripheral vascular disease (H)      Pneumonia 6/26/2018     Type 2 diabetes mellitus, without long-term current use of insulin (H)      Unable to function independently 11/13/2017      PAST SURGICAL HISTORY:   has a past surgical history that includes cabg measures grp; IR Extremity Angiogram Bilateral (11/3/2017); Bypass graft artery coronary (N/A, 03/06/2000); Cardioversion (08/25/2011); Amputate foot (Left, 11/05/2017); Inguinal Hernia Repair (Bilateral, 1969); Cv Coronary Angiogram (N/A, 10/01/2018); Laparoscopic cholecystectomy (N/A, 08/18/2019); and Bypass graft artery coronary (N/A, 10/04/2018).  FAMILY HISTORY: family history includes CABG in his father; Esophageal Cancer (age of  onset: 58.00) in his father; No Known Problems in his grandchild, grandchild, sister, sister, and son; Obesity in his sister; Osteoarthritis in his sister; Sudden Death (age of onset: 85.00) in his mother; Valvular heart disease in his father.  SOCIAL HISTORY:   reports that he quit smoking about 22 years ago. His smoking use included cigarettes and cigarettes. He started smoking about 58 years ago. He has a 36.00 pack-year smoking history. He has never used smokeless tobacco. He reports current alcohol use of about 5.0 standard drinks per week. He reports that he does not use drugs.    Post Discharge Medication Reconciliation Status: unable. This med list is not accurate.   Current Outpatient Medications   Medication Sig     acetaminophen (TYLENOL) 325 MG tablet Take 650 mg by mouth every 6 hours     albuterol (2.5 MG/3ML) 0.083% neb solution Take 2.5 mg by nebulization every 6 hours as needed     albuterol (PROAIR HFA/PROVENTIL HFA/VENTOLIN HFA) 108 (90 Base) MCG/ACT inhaler Inhale 2 puffs into the lungs 2 times daily And q4h PRN     alum & mag hydroxide-simethicone (MAALOX) 200-200-20 MG/5ML SUSP suspension Take 30 mLs by mouth every 4 hours as needed for indigestion     bisacodyl (DULCOLAX) 5 MG EC tablet Take 5 mg by mouth daily as needed for constipation     calcium polycarbophil (FIBERCON) 625 MG tablet Take 1 tablet by mouth daily     celecoxib (CELEBREX) 200 MG capsule Take 200 mg by mouth daily     cetirizine (ZYRTEC) 10 MG tablet Take 10 mg by mouth daily     cyanocobalamin (VITAMIN B-12) 2500 MCG SUBL sublingual tablet Place 2,500 mcg under the tongue daily     glipiZIDE (GLUCOTROL) 10 MG tablet Take 1 tablet (10 mg) by mouth every morning (before breakfast)     glipiZIDE (GLUCOTROL) 5 MG tablet Take 3 tablets (15 mg) by mouth every evening     guaiFENesin (MUCINEX) 600 MG 12 hr tablet Take 600 mg by mouth 2 times daily     insulin glargine (LANTUS PEN) 100 UNIT/ML pen Inject 26 Units Subcutaneous At  "Bedtime     ipratropium (ATROVENT) 0.06 % nasal spray Spray 1 spray into both nostrils 4 times daily as needed for rhinitis     ketoconazole (NIZORAL) 2 % external shampoo Apply topically twice a week Mon and Fri.     liraglutide (VICTOZA) 18 MG/3ML solution Inject 0.6 mg Subcutaneous daily     mineral oil-hydrophilic petrolatum (AQUAPHOR) external ointment Apply topically 2 times daily     nitroGLYcerin (NITROSTAT) 0.4 MG sublingual tablet Place 0.4 mg under the tongue every 5 minutes as needed     omeprazole (PRILOSEC) 20 MG CR capsule Take 20 mg by mouth daily     polyethylene glycol (MIRALAX) 17 g packet Take 1 packet by mouth daily as needed for constipation     rivaroxaban ANTICOAGULANT (XARELTO) 15 MG TABS tablet Take by mouth daily (with dinner)     senna-docusate (SENOKOT-S/PERICOLACE) 8.6-50 MG tablet Take 1 tablet by mouth 2 times daily And BID PRN     simvastatin (ZOCOR) 40 MG tablet Take 40 mg by mouth At Bedtime     spironolactone (ALDACTONE) 25 MG tablet Take 25 mg by mouth daily     sulfamethoxazole-trimethoprim (BACTRIM DS) 800-160 MG tablet Take 1 tablet by mouth 2 times daily     triamcinolone (KENALOG) 0.1 % cream Apply 1 Application topically daily     urea (CARMOL) 10 % cream Apply 1 Application topically daily as needed     No current facility-administered medications for this visit.       ROS:  4 point ROS including Respiratory, CV, GI and , other than that noted in the HPI,  is negative    Vitals:  /63   Pulse 64   Temp 98.2  F (36.8  C)   Resp 16   Ht 1.778 m (5' 10\")   Wt 91.2 kg (201 lb)   SpO2 94%   BMI 28.84 kg/m    Exam:  GENERAL APPEARANCE:  Alert, in no distress  RESP:  respiratory effort normal  CV:  edema none  ABDOMEN:  normal bowel sounds, soft, nontender, no hepatosplenomegaly or other masses  M/S:  Gait and station - sitting on side of bed.  no tenderness or swelling of the joints   SKIN:  Inspection and palpation of skin and subcutaneous tissue. Both mid shins " and calves bright red with red scratch marks.  PSYCH:  insight and judgement, memory seems intact, affect and mood normal    Lab/Diagnostic data: Pertinent hospital labs: 1/17 lactic acid 1.6 CRP 92.7 ESR 48  K4.7 BUN 19.5 creatinine 1.61 GFR 44 WBC 11.2 hemoglobin 11.4 platelets 164    ASSESSMENT/PLAN:  Osteomyelitis of right foot, unspecified type (H)  Diabetic ulcer of toe of right foot associated with diabetes mellitus due to underlying condition, limited to breakdown of skin (H)  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  Patient has been admitted to the TCU for therapy and nursing care. He is followed by Dr. Spears of podiatry.  Patient is discussing amputation due to osteomyelitis.  -Continue wound cares as ordered  -Follow-up with podiatry and vascular surgery as directed  -There is mention of him being on Bactrim in the notes but was not on the med list.   -Requested med list from his assisted living    Type 2 diabetes mellitus with other circulatory complication, with long-term current use of insulin (H)  Current regimen unclear.  The patient is on Victoza 1.2 mg subcutaneously 1 time a day.  He has orders for glipizide 10 mg 2 times a day.  There is mention of him being on glargine and the dose was reduced to 10 units recently in the note from his primary NP.     Essential hypertension  Currently on losartan    Heart failure with preserved ejection fraction, NYHA class II (H)  No current concerns.  Continues on losartan and spironolactone     Stage 3b chronic kidney disease (H)  Creatinine   Date Value Ref Range Status   01/17/2023 1.61 (H) 0.67 - 1.17 mg/dL Final   06/30/2018 1.20 0.66 - 1.25 mg/dL Final   Monitor as needed.  Keep kidney function in mind with medication changes and illness      Orders:  Check blood sugars 4 times daily diagnosis DM2  Change urea cream to  apply to both legs daily  Add bactrim DS 1 tab po BID through 1/24.   Admission coordinator is working on getting MAR from facility.     45 MINUTES SPENT BY ME on the date of service doing chart review, history, exam, documentation & further activities per the note.     Electronically signed by: Kellen Eid NP

## 2023-01-17 NOTE — DISCHARGE INSTRUCTIONS
"Continue   Dressing Change lnstructions  NO COMPRESSION BILATERALLY AT THIS  TIME  Right foot:  Apply dry gauze to right foot. Change daily/.  Left leg:  3 TIMES PER WEEK and as needed, Cleanse your leg wound(s) with Normal saline.  Pat Dry with non-sterile gauze  Primary Dressing: Apply Hydofera blue into/onto the wounds  Secondary dressing: Cover with dry gauze  Secure with non-sterile roll gauze (4\" x 75\" roll) and tape (1\" roll tape) as needed; avoid adhesive directly on the skin  Bilateral Heels:  - Dressing Application of Endoform  1. Endoform should be cut to the size of the wound. It should touch the edges of the ulcer. It is okay if it overlap the  edges a small amount. Then, moisten the Endoform with saline.(If it is easier for you, you can moisten it before laying it in  the wound)  2. Cover the wound with Endoform, followed by dry gauze, and secure with roll gauze if needed.  3. Endoform is naturally used by the body over time so you may not find it in the wound when you change your dressing.  If you do find some, gently cleanse the wound with saline. Do not remove the remaining Endoform, which may appear  as an off-white to max gel, just add Endoform on top. Usually, more Endoform will need to be added every 5-7 days.  4. Change your top dressing every 1-2 days or as needed depending on drainage.  -Endoform is a collagen dressing created from ovine (sheep) fore-stomach tissue. The collagen extracellular matrix  transforms into a soft conforming sheet, which is naturally incorporated into the wound over time. Collagen dressings are  used to stimulate wound healing.  It IS NOT ok to get your wound wet in the bath or shower  SEEK MEDICAL CARE IF:  You have an increase in swelling, pain, or redness around the wound.  You have an increase in the amount of pus coming from the wound.  There is a bad smell coming from the wound.  The wound appears to be worsening/enlarging  You have a fever greater than " 101.5 F

## 2023-01-17 NOTE — PROGRESS NOTES
RN to call Lakeisha at [phone] for any updates on arrival time.    [facility] transport arrived, pt is not ready. Charge Updated and will call for ride when ready, pt needs to be at [place] before 5pm otherwise will need to wait til tomorrow.

## 2023-01-17 NOTE — ED PROVIDER NOTES
EMERGENCY DEPARTMENT ENCOUNTER      NAME: Ever Crane  AGE: 77 year old male  YOB: 1945  MRN: [id number]  EVALUATION DATE & TIME: 1/17/2023 12:07 PM    PCP: No Ref-Primary, Physician    ED PROVIDER: Annelise Lundberg MD    Chief Complaint   Patient presents with     Leg Problem         FINAL IMPRESSION:  1. Diabetic foot ulcer associated with type 2 diabetes mellitus, unspecified laterality, unspecified part of foot, unspecified ulcer stage (H)    2. Osteomyelitis of right foot, unspecified type (H)          ED COURSE & MEDICAL DECISION MAKING:    Pertinent Labs & Imaging studies reviewed. (See chart for details)  77 year old male with history of diabetes, known osteomyelitis to the right foot who presents to the Emergency Department for evaluation of ulcers of the bilateral lower extremities, from care facility and they are concerned that he is more swollen or erythematous and concern for sepsis.  Patient however does not have any fevers chills or systemic symptoms.  He notes pain to the bilateral heels where he had debridement.  He has ulcers, suis see exam for pictures of same.  His exam is actually quite reassuring.  He has erythema to the bilateral calfs where he has venous stasis skin changes but surrounding the ulcers there is really actually not any erythema.  He has some chronic appearing necrotic changes and known osteomyelitis of the right toes.  I suspect that this is all chronic in nature.  We did obtain CBC, BMP, ESR and CRP here today.  His creatinine is actually down from previous.  He does not have any hyperglycemia that would suggest sepsis.  Though his CRP and his ESR are elevated they are actually down from previous and her baseline.  I do not think he warrants any additional testing or treatment or imaging.  He does not warrant repeat MRI.  This was recently done as well as vascular ultrasound and he has follow-up with vascular to evaluate the arterial flow to the lower extremities.   We will discharged to TCU.  Verbal report given to NP there.      ED Course as of 01/17/23 1417   Tue Jan 17, 2023   1221 I met with the patient to gather history and to perform my initial exam. We discussed plans for the ED course, including diagnostic testing and treatment.      1314 CRP Inflammation(!): 92.70  Down from prev   1319 Creatinine(!): 1.61  Down from baseline   1350 I [facility] with [facility].        Medical Decision Making    History:    Supplemental history from: Documented in chart, if applicable    External Record(s) reviewed: Documented in chart, if applicable.    Work Up:    Chart documentation includes differential considered and any EKGs or imaging independently interpreted by provider, where specified.    In additional to work up documented, I considered the following work up: Documented in chart, if applicable.    External consultation:    Discussion of management with another provider: Documented in chart, if applicable    Complicating factors:    Care impacted by chronic illness: Diabetes    Care affected by social determinants of health: N/A    Disposition considerations: Discharge. I recommended the patient continue their current prescription strength medication(s): Bactrim previously prescribed. N/A.        At the conclusion of the encounter I discussed the results of all of the tests and the disposition. The questions were answered. The patient or family acknowledged understanding and was agreeable with the care plan.    CONSULTS:  Adriano nurse practitioner  ED case manager        MEDICATIONS GIVEN IN THE EMERGENCY:  Medications - No data to display    NEW PRESCRIPTIONS STARTED AT TODAY'S ER VISIT  New Prescriptions    No medications on file          =================================================================    HPI    Patient information was obtained from: Patient     Use of Intrepreter: N/A        Ever Crane is a 77 year old male with pertinent medical history  of hypertension, pneumonia, carotid stenosis, CKD3b, heart failure, A-flutter, DM2, PAD, diabetic ulcer of multiple extremities and toes, osteomyelitis, who presents with leg problems.     Per chart review, patient was seen on 1/13 by Dr. Spears with vascular surgery. MRI right foot from 1/9 showed: 1.  Changes of osteomyelitis involving the proximal and middle phalanges of the fourth toe and proximal, middle and distal phalanges of the fifth toe. 2.  No evidence of an abscess. 3.  Moderate degenerative changes at the first MTP joint. Recommend a transmetatarsal amputation with achilles tendon lengthening. Patient had three ulcers debrided. Continued on bactrim.     Patient reports wounds on his bilateral lower extremities for years. He notes in the last four days there has been an increase in redness to his wounds, left more red than right. He notes he recently had his surgeon remove dead skin from the bottoms of his heels and they are now bleeding. Patient has an appointment with vascular surgery on 11/19 for possible amputation.       REVIEW OF SYSTEMS  Constitutional:  Denies fever, chills, weight loss or weakness  GI:  Denies abdominal pain, nausea, vomiting, diarrhea  Musculoskeletal:  Denies any new muscle/joint pain, swelling or loss of function.  Skin:  Denies pallor. Endorses wounds to bilateral lower extremities with increased redness.   Neurologic:  Denies headache, focal weakness or sensory changes    PAST MEDICAL HISTORY:  Past Medical History:   Diagnosis Date     A-fib (H)      MESHA (acute kidney injury) (H)      Atopic keratoconjunctivitis      Atrial fibrillation (H)     Brian Moe: 8/2011 Cardioversion; CHADS2 VASC = 5; he is on warfarin and sotalol      Atrial flutter (H)      Mcgarry's esophagus 1/1/2012    per note of Dr. Tavo Mike of [facility]     Bilateral lower leg cellulitis 8/31/2018     Candidiasis of perineum 1/3/2018     Carotid stenosis, asymptomatic, right      CHF (congestive heart  failure) (H)      Cholecystitis, acute 8/18/2019     Coronary artery disease due to lipid rich plaque 2000    CABG x2     Diabetes (H)      Diabetic ulcer of both feet (H) 10/31/2017     Dyslexia      Dyslipidemia, goal LDL below 70 2000     Epistaxis      Essential hypertension      Gangrene of left foot (H)      GERD (gastroesophageal reflux disease)      HLD (hyperlipidemia)      HTN (hypertension)      Hyponatremia      MRSA (methicillin resistant Staphylococcus aureus)      Neuropathy      Non-STEMI (non-ST elevated myocardial infarction) (H)      Other atopic dermatitis      Peripheral vascular disease (H)      Pneumonia 6/26/2018     Type 2 diabetes mellitus, without long-term current use of insulin (H)      Unable to function independently 11/13/2017       PAST SURGICAL HISTORY:  Past Surgical History:   Procedure Laterality Date     AMPUTATE FOOT Left 11/05/2017    Procedure: LEFT TRANSMETATARSAL AMPUTATION;  Surgeon: Ever Wick MD;  Location: [facility];  Service:      BYPASS GRAFT ARTERY CORONARY N/A 03/06/2000    SVG to OM1, SVG to PDA     BYPASS GRAFT ARTERY CORONARY N/A 10/04/2018    redo CABG due to graft failure     CABG MEASURES GRP       CARDIOVERSION  08/25/2011     CV CORONARY ANGIOGRAM N/A 10/01/2018    Procedure: Coronary Angiogram;  Surgeon: Miki Mac MD;  Location: [facility];  Service:      INGUINAL HERNIA REPAIR Bilateral 1969    and 1979     IR EXTREMITY ANGIOGRAM BILATERAL  11/3/2017     LAPAROSCOPIC CHOLECYSTECTOMY N/A 08/18/2019    Procedure: CHOLECYSTECTOMY, LAPAROSCOPIC;  Surgeon: Stewart Fountain MD;  [facility];  Service: General       CURRENT MEDICATIONS:    Prior to Admission Medications   Prescriptions Last Dose Informant Patient Reported? Taking?   acetaminophen (TYLENOL) 325 MG tablet   Yes No   Sig: Take 650 mg by mouth every 6 hours   albuterol (2.5 MG/3ML) 0.083% neb solution   Yes No   Sig: Take 2.5 mg by nebulization  every 6 hours as needed   albuterol (PROAIR HFA/PROVENTIL HFA/VENTOLIN HFA) 108 (90 Base) MCG/ACT inhaler   Yes No   Sig: Inhale 2 puffs into the lungs 2 times daily And q4h PRN   alum & mag hydroxide-simethicone (MAALOX) 200-200-20 MG/5ML SUSP suspension   Yes No   Sig: Take 30 mLs by mouth every 4 hours as needed for indigestion   bisacodyl (DULCOLAX) 5 MG EC tablet   Yes No   Sig: Take 5 mg by mouth daily as needed for constipation   calcium polycarbophil (FIBERCON) 625 MG tablet   Yes No   Sig: Take 1 tablet by mouth daily   celecoxib (CELEBREX) 200 MG capsule   Yes No   Sig: Take 200 mg by mouth daily   cetirizine (ZYRTEC) 10 MG tablet   Yes No   Sig: Take 10 mg by mouth daily   cyanocobalamin (VITAMIN B-12) 2500 MCG SUBL sublingual tablet   Yes No   Sig: Place 2,500 mcg under the tongue daily   glipiZIDE (GLUCOTROL) 10 MG tablet   No No   Sig: Take 1 tablet (10 mg) by mouth every morning (before breakfast)   glipiZIDE (GLUCOTROL) 5 MG tablet   No No   Sig: Take 3 tablets (15 mg) by mouth every evening   guaiFENesin (MUCINEX) 600 MG 12 hr tablet   Yes No   Sig: Take 600 mg by mouth 2 times daily   insulin glargine (LANTUS PEN) 100 UNIT/ML pen   Yes No   Sig: Inject 26 Units Subcutaneous At Bedtime   ipratropium (ATROVENT) 0.06 % nasal spray   Yes No   Sig: Spray 1 spray into both nostrils 4 times daily as needed for rhinitis   ketoconazole (NIZORAL) 2 % external shampoo   Yes No   Sig: Apply topically twice a week Mon and Fri.   liraglutide (VICTOZA) 18 MG/3ML solution   Yes No   Sig: Inject 0.6 mg Subcutaneous daily   mineral oil-hydrophilic petrolatum (AQUAPHOR) external ointment   Yes No   Sig: Apply topically 2 times daily   nitroGLYcerin (NITROSTAT) 0.4 MG sublingual tablet   Yes No   Sig: Place 0.4 mg under the tongue every 5 minutes as needed   omeprazole (PRILOSEC) 20 MG CR capsule   Yes No   Sig: Take 20 mg by mouth daily   polyethylene glycol (MIRALAX) 17 g packet   Yes No   Sig: Take 1 packet by  mouth daily as needed for constipation   rivaroxaban ANTICOAGULANT (XARELTO) 15 MG TABS tablet   Yes No   Sig: Take by mouth daily (with dinner)   senna-docusate (SENOKOT-S/PERICOLACE) 8.6-50 MG tablet   Yes No   Sig: Take 1 tablet by mouth 2 times daily And BID PRN   simvastatin (ZOCOR) 40 MG tablet   Yes No   Sig: Take 40 mg by mouth At Bedtime   spironolactone (ALDACTONE) 25 MG tablet   Yes No   Sig: Take 25 mg by mouth daily   sulfamethoxazole-trimethoprim (BACTRIM DS) 800-160 MG tablet   No No   Sig: Take 1 tablet by mouth 2 times daily   triamcinolone (KENALOG) 0.1 % cream   Yes No   Sig: Apply 1 Application topically daily   urea (CARMOL) 10 % cream   Yes No   Sig: Apply 1 Application topically daily as needed      Facility-Administered Medications: None       ALLERGIES:  Allergies   Allergen Reactions     Doxycycline      Other reaction(s): rash     Penicillin V      Other reaction(s): Unknown     Latex Rash     Penicillins Rash     When he was a child  Tolerated Zosyn 2018, also tolerated Augmentin?  Childhood rxn  -- tolerated cefazolin 10/4/18       FAMILY HISTORY:  Family History   Problem Relation Age of Onset     Sudden Death Mother 85.00     CABG Father      Valvular heart disease Father      Esophageal Cancer Father 58.00        cause of death     No Known Problems Son      No Known Problems Sister      Obesity Sister      Osteoarthritis Sister      No Known Problems Sister      No Known Problems Grandchild      No Known Problems Grandchild        SOCIAL HISTORY:  Social History     Tobacco Use     Smoking status: Former     Packs/day: 1.00     Years: 36.00     Pack years: 36.00     Types: Cigarettes     Start date: 1964     Quit date: 2000     Years since quittin.7     Smokeless tobacco: Never   Substance Use Topics     Alcohol use: Yes     Alcohol/week: 5.0 standard drinks     Types: 1 Cans of beer per week     Drug use: No        VITALS:  Patient Vitals for the past 24 hrs:   BP  Temp Temp src Pulse Resp SpO2   01/17/23 1215 (!) 157/73 98.1  F (36.7  C) Oral 64 18 97 %       PHYSICAL EXAM    General Appearance: Chronically ill-appearing elderly male in no acute distress  Head:  Normocephalic  Eyes:  conjunctiva/corneas clear  ENT:  membranes are moist without pallor  Cardio:  Regular rate and rhythm  Pulm:  No respiratory distress  Abdomen:  Soft, non-tender  Extremities: Extremities atraumatic.  There is venous stasis skin changes to the bilateral calves with mild erythema.  This is symmetric.  No warmth, no drainage on my exam.  Skin:  Large ulcer with necrotic base to right heel. Right fifth lateral MTP ulcer and right fifth toe, between fourth and fifth, ulcer. Left heel with large ulcer area with necrotic base. 2.5cm x 7cm ulcer to right lateral shin.   Neuro:  Alert and oriented ×3, moving all extremities, no gross sensory defects                         RADIOLOGY/LABS:  Reviewed all pertinent imaging. Please see official radiology report. All pertinent labs reviewed and interpreted.    Results for orders placed or performed during the hospital encounter of 01/17/23   CBC (+ platelets, no diff)   Result Value Ref Range    WBC Count 11.2 (H) 4.0 - 11.0 10e3/uL    RBC Count 4.08 (L) 4.40 - 5.90 10e6/uL    Hemoglobin 11.4 (L) 13.3 - 17.7 g/dL    Hematocrit 35.9 (L) 40.0 - 53.0 %    MCV 88 78 - 100 fL    MCH 27.9 26.5 - 33.0 pg    MCHC 31.8 31.5 - 36.5 g/dL    RDW 13.6 10.0 - 15.0 %    Platelet Count 164 150 - 450 10e3/uL   Basic metabolic panel   Result Value Ref Range    Sodium 135 (L) 136 - 145 mmol/L    Potassium 4.7 3.4 - 5.3 mmol/L    Chloride 102 98 - 107 mmol/L    Carbon Dioxide (CO2) 21 (L) 22 - 29 mmol/L    Anion Gap 12 7 - 15 mmol/L    Urea Nitrogen 19.5 8.0 - 23.0 mg/dL    Creatinine 1.61 (H) 0.67 - 1.17 mg/dL    Calcium 9.6 8.8 - 10.2 mg/dL    Glucose 75 70 - 99 mg/dL    GFR Estimate 44 (L) >60 mL/min/1.73m2   Erythrocyte sedimentation rate auto   Result Value Ref Range     Erythrocyte Sedimentation Rate 48 (H) 0 - 15 mm/hr   Result Value Ref Range    CRP Inflammation 92.70 (H) <5.00 mg/L   Lactic acid whole blood   Result Value Ref Range    Lactic Acid 1.6 0.7 - 2.0 mmol/L       The creation of this record is based on the scribe s observations of the work being performed by Annelise Lundberg MD and the provider s statements to them. It was created on her behalf by Leonela Hickman, a trained medical scribe. This document has been checked and approved by the attending provider.    Annelise Lundberg MD  Emergency Medicine  [facility] EMERGENCY DEPARTMENT  [facility] [facility] [address] [phone]  Dept: [phone]       Annelise Lundberg MD  01/17/23 2744

## 2023-01-17 NOTE — ED TRIAGE NOTES
nigel ems to [facility].  Pt from [facility].  Frida provider there [phone].  Pt sent in for swelling, red bilat lower extremities for last couple days after wound care came to see pt regarding R lower leg and advised pt not to wear compression socks.  Has partial amp L foot and per ems had some debreedment last week sometime.  They are seeking TCU for pt and to have L LE evaluated per EMS.      Triage Assessment     Row Name 01/17/23 1216       Triage Assessment (Adult)    Airway WDL WDL       Respiratory WDL    Respiratory WDL WDL       Skin Circulation/Temperature WDL    Skin Circulation/Temperature WDL WDL       Cardiac WDL    Cardiac WDL WDL       Peripheral/Neurovascular WDL    Peripheral Neurovascular WDL WDL       Cognitive/Neuro/Behavioral WDL    Cognitive/Neuro/Behavioral WDL WDL

## 2023-01-17 NOTE — ED NOTES
Wrapped pt feet with kerlix, telfa, silver cell dressing from store room. Report to MARION Burrell.

## 2023-01-17 NOTE — PROGRESS NOTES
Pt comes from [facility], planned to admit to [facility] the [facility] at 3pm today, MD saw hm and concerns for sepsis and check kidney functions, has been on Bactrim x 15 days for known Osteomylitis and has seen Podiatry and has appt on Thursday w/vascular. Pt can go to [facility] if things are ruled out here and a peer to peer is done with [facility] physician, Frida is DANII and her number is [phone].    Frida SCHULTZ CM from [facility]  Has updated Lakeisha at [facility]. Orders sent from [facility] and pt can go when ready.     WOC RN will not be able to see patient today and will order alternative dressing from stores to be delivered, Nicole, ER RN can wrap him up and will then be ready for discharge.     Primary RN Updated. Plan for ride to be at 4pm via [employer].

## 2023-01-18 ENCOUNTER — TRANSITIONAL CARE UNIT VISIT (OUTPATIENT)
Dept: GERIATRICS | Facility: CLINIC | Age: 78
End: 2023-01-18
Payer: MEDICARE

## 2023-01-18 VITALS
SYSTOLIC BLOOD PRESSURE: 128 MMHG | WEIGHT: 201 LBS | TEMPERATURE: 98.2 F | DIASTOLIC BLOOD PRESSURE: 63 MMHG | HEART RATE: 64 BPM | RESPIRATION RATE: 16 BRPM | BODY MASS INDEX: 28.77 KG/M2 | HEIGHT: 70 IN | OXYGEN SATURATION: 94 %

## 2023-01-18 DIAGNOSIS — Z79.4 TYPE 2 DIABETES MELLITUS WITH OTHER CIRCULATORY COMPLICATION, WITH LONG-TERM CURRENT USE OF INSULIN (H): ICD-10-CM

## 2023-01-18 DIAGNOSIS — E08.621 DIABETIC ULCER OF TOE OF RIGHT FOOT ASSOCIATED WITH DIABETES MELLITUS DUE TO UNDERLYING CONDITION, LIMITED TO BREAKDOWN OF SKIN (H): ICD-10-CM

## 2023-01-18 DIAGNOSIS — E11.59 TYPE 2 DIABETES MELLITUS WITH OTHER CIRCULATORY COMPLICATION, WITH LONG-TERM CURRENT USE OF INSULIN (H): ICD-10-CM

## 2023-01-18 DIAGNOSIS — I10 ESSENTIAL HYPERTENSION: ICD-10-CM

## 2023-01-18 DIAGNOSIS — M86.9 OSTEOMYELITIS OF RIGHT FOOT, UNSPECIFIED TYPE (H): Primary | ICD-10-CM

## 2023-01-18 DIAGNOSIS — E08.621 DIABETIC ULCER OF TOE OF RIGHT FOOT ASSOCIATED WITH DIABETES MELLITUS DUE TO UNDERLYING CONDITION, WITH NECROSIS OF BONE (H): ICD-10-CM

## 2023-01-18 DIAGNOSIS — L97.514 DIABETIC ULCER OF TOE OF RIGHT FOOT ASSOCIATED WITH DIABETES MELLITUS DUE TO UNDERLYING CONDITION, WITH NECROSIS OF BONE (H): ICD-10-CM

## 2023-01-18 DIAGNOSIS — N18.32 STAGE 3B CHRONIC KIDNEY DISEASE (H): ICD-10-CM

## 2023-01-18 DIAGNOSIS — I50.30 HEART FAILURE WITH PRESERVED EJECTION FRACTION, NYHA CLASS II (H): ICD-10-CM

## 2023-01-18 DIAGNOSIS — L97.511 DIABETIC ULCER OF TOE OF RIGHT FOOT ASSOCIATED WITH DIABETES MELLITUS DUE TO UNDERLYING CONDITION, LIMITED TO BREAKDOWN OF SKIN (H): ICD-10-CM

## 2023-01-18 PROBLEM — S72.009A HIP FRACTURE (H): Status: RESOLVED | Noted: 2022-06-27 | Resolved: 2023-01-18

## 2023-01-18 PROBLEM — N17.9 AKI (ACUTE KIDNEY INJURY) (H): Status: RESOLVED | Noted: 2022-06-21 | Resolved: 2023-01-18

## 2023-01-18 PROCEDURE — 99310 SBSQ NF CARE HIGH MDM 45: CPT | Performed by: NURSE PRACTITIONER

## 2023-01-18 NOTE — LETTER
1/18/2023        RE: Ever Crane  [address]  [address]  [facility] [address]        [facility] GERIATRICS  Primary Care Provider & Clinic: [facility], [address] [address]  Chief Complaint   Patient presents with     Hospital F/U     [facility] ED 1/17/2023 (4 hours)     [place] Medical Record Number: [id number]  Place of Service Where Encounter Took Place: [facility] ([facility]) [[id number]]    Ever Crane is a 77 year old (1945), admitted to the above facility from [facility]. Emergency department stay 1/17/2023.  Patient's living condition: lives in an assisted living facility    HPI:    Brief Summary of Hospital Course: patient was to be directly admitted to the tcu for wound care and therapy from his assisted living but due to concern over increase in redness to his wounds he was seen in the ER yesterday. He follows with Dr. spears and patient is discussing amputation.   MEDICATION CHANGES: none  RECOMMENDED FOLLOW UP: vascular surgeon.   Updates on Status Since Skilled nursing Admission: none    Today: patient states he is at [facility] for wound care. Doesn't think he needs therapy. He reports constipation currently. But alternates between constipation and diarrhea at baseline.   External notes reviewed: Facility EHR including progress notes, vital signs. [facility] note from 1/17, and ER note from 1/17. And Dr. Spears's note from 1/13.   Tests/results reviewed: recent labs reviewed none  HPI/ROS reviewed with Independent historian: no concerns  HPI/ROS reviewed with other qualified health care personal: Nurse reports no concerns    CODE STATUS/ADVANCE DIRECTIVES DISCUSSION: Full    ALLERGIES:   Allergies   Allergen Reactions     Doxycycline Rash     Latex Rash     Penicillin V Rash     Reaction occurred as a child. Patient tolerated Zosyn 6/2018, Cefazolin 10/2018, and has also tolerated Augmentin.      Penicillins Rash     Reaction occurred as a child. Patient tolerated Zosyn 6/2018, Cefazolin 10/2018, and has also tolerated Augmentin.      PAST MEDICAL HISTORY:   Past Medical History:   Diagnosis Date     A-fib (H)      MESHA (acute kidney injury) (H)      Atopic keratoconjunctivitis      Atrial fibrillation (H)     Brian Moe: 8/2011 Cardioversion; CHADS2 VASC = 5; he is on warfarin and sotalol      Atrial flutter (H)      Mcgarry's esophagus 1/1/2012    per note of Dr. Tavo Mike of [facility]     Bilateral lower leg cellulitis 8/31/2018     Candidiasis of perineum 1/3/2018     Carotid stenosis, asymptomatic, right      CHF (congestive heart failure) (H)      Cholecystitis, acute 8/18/2019     Closed nondisplaced intertrochanteric fracture of left femur with routine healing, subsequent encounter 5/18/2022     Coronary artery disease due to lipid rich plaque 2000    CABG x2     Diabetes (H)      Diabetic ulcer of both feet (H) 10/31/2017     Dyslexia      Dyslipidemia, goal LDL below 70 2000     Epistaxis      Essential hypertension      Gangrene of left foot (H)      GERD (gastroesophageal reflux disease)      HLD (hyperlipidemia)      HTN (hypertension)      Hyponatremia      MRSA (methicillin resistant Staphylococcus aureus)      Neuropathy      Non-STEMI (non-ST elevated myocardial infarction) (H)      Other atopic dermatitis      Peripheral vascular disease (H)      Pneumonia 6/26/2018     Type 2 diabetes mellitus, without long-term current use of insulin (H)      Unable to function independently 11/13/2017      PAST SURGICAL HISTORY:   has a past surgical history that includes cabg measures grp; IR Extremity Angiogram Bilateral (11/3/2017); Bypass graft artery coronary (N/A, 03/06/2000); Cardioversion (08/25/2011); Amputate foot (Left, 11/05/2017); Inguinal Hernia Repair (Bilateral, 1969); Cv Coronary Angiogram (N/A, 10/01/2018); Laparoscopic cholecystectomy (N/A, 08/18/2019); and Bypass graft artery coronary  (N/A, 10/04/2018).  FAMILY HISTORY: family history includes CABG in his father; Esophageal Cancer (age of onset: 58.00) in his father; No Known Problems in his grandchild, grandchild, sister, sister, and son; Obesity in his sister; Osteoarthritis in his sister; Sudden Death (age of onset: 85.00) in his mother; Valvular heart disease in his father.  SOCIAL HISTORY:   reports that he quit smoking about 22 years ago. His smoking use included cigarettes and cigarettes. He started smoking about 58 years ago. He has a 36.00 pack-year smoking history. He has never used smokeless tobacco. He reports current alcohol use of about 5.0 standard drinks per week. He reports that he does not use drugs.    Post Discharge Medication Reconciliation Status: unable. This med list is not accurate.   Current Outpatient Medications   Medication Sig     acetaminophen (TYLENOL) 325 MG tablet Take 650 mg by mouth every 6 hours     albuterol (2.5 MG/3ML) 0.083% neb solution Take 2.5 mg by nebulization every 6 hours as needed     albuterol (PROAIR HFA/PROVENTIL HFA/VENTOLIN HFA) 108 (90 Base) MCG/ACT inhaler Inhale 2 puffs into the lungs 2 times daily And q4h PRN     alum & mag hydroxide-simethicone (MAALOX) 200-200-20 MG/5ML SUSP suspension Take 30 mLs by mouth every 4 hours as needed for indigestion     bisacodyl (DULCOLAX) 5 MG EC tablet Take 5 mg by mouth daily as needed for constipation     calcium polycarbophil (FIBERCON) 625 MG tablet Take 1 tablet by mouth daily     celecoxib (CELEBREX) 200 MG capsule Take 200 mg by mouth daily     cetirizine (ZYRTEC) 10 MG tablet Take 10 mg by mouth daily     cyanocobalamin (VITAMIN B-12) 2500 MCG SUBL sublingual tablet Place 2,500 mcg under the tongue daily     glipiZIDE (GLUCOTROL) 10 MG tablet Take 1 tablet (10 mg) by mouth every morning (before breakfast)     glipiZIDE (GLUCOTROL) 5 MG tablet Take 3 tablets (15 mg) by mouth every evening     guaiFENesin (MUCINEX) 600 MG 12 hr tablet Take 600 mg by  "mouth 2 times daily     insulin glargine (LANTUS PEN) 100 UNIT/ML pen Inject 26 Units Subcutaneous At Bedtime     ipratropium (ATROVENT) 0.06 % nasal spray Spray 1 spray into both nostrils 4 times daily as needed for rhinitis     ketoconazole (NIZORAL) 2 % external shampoo Apply topically twice a week Mon and Fri.     liraglutide (VICTOZA) 18 MG/3ML solution Inject 0.6 mg Subcutaneous daily     mineral oil-hydrophilic petrolatum (AQUAPHOR) external ointment Apply topically 2 times daily     nitroGLYcerin (NITROSTAT) 0.4 MG sublingual tablet Place 0.4 mg under the tongue every 5 minutes as needed     omeprazole (PRILOSEC) 20 MG CR capsule Take 20 mg by mouth daily     polyethylene glycol (MIRALAX) 17 g packet Take 1 packet by mouth daily as needed for constipation     rivaroxaban ANTICOAGULANT (XARELTO) 15 MG TABS tablet Take by mouth daily (with dinner)     senna-docusate (SENOKOT-S/PERICOLACE) 8.6-50 MG tablet Take 1 tablet by mouth 2 times daily And BID PRN     simvastatin (ZOCOR) 40 MG tablet Take 40 mg by mouth At Bedtime     spironolactone (ALDACTONE) 25 MG tablet Take 25 mg by mouth daily     sulfamethoxazole-trimethoprim (BACTRIM DS) 800-160 MG tablet Take 1 tablet by mouth 2 times daily     triamcinolone (KENALOG) 0.1 % cream Apply 1 Application topically daily     urea (CARMOL) 10 % cream Apply 1 Application topically daily as needed     No current facility-administered medications for this visit.       ROS:  4 point ROS including Respiratory, CV, GI and , other than that noted in the HPI,  is negative    Vitals:  /63   Pulse 64   Temp 98.2  F (36.8  C)   Resp 16   Ht 1.778 m (5' 10\")   Wt 91.2 kg (201 lb)   SpO2 94%   BMI 28.84 kg/m    Exam:  GENERAL APPEARANCE:  Alert, in no distress  RESP:  respiratory effort normal  CV:  edema none  ABDOMEN:  normal bowel sounds, soft, nontender, no hepatosplenomegaly or other masses  M/S:  Gait and station - sitting on side of bed.  no tenderness or " swelling of the joints   SKIN:  Inspection and palpation of skin and subcutaneous tissue. Both mid shins and calves bright red with red scratch marks.  PSYCH:  insight and judgement, memory seems intact, affect and mood normal    Lab/Diagnostic data: Pertinent hospital labs: 1/17 lactic acid 1.6 CRP 92.7 ESR 48  K4.7 BUN 19.5 creatinine 1.61 GFR 44 WBC 11.2 hemoglobin 11.4 platelets 164    ASSESSMENT/PLAN:  Osteomyelitis of right foot, unspecified type (H)  Diabetic ulcer of toe of right foot associated with diabetes mellitus due to underlying condition, limited to breakdown of skin (H)  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  Patient has been admitted to the TCU for therapy and nursing care. He is followed by Dr. Spears of podiatry.  Patient is discussing amputation due to osteomyelitis.  -Continue wound cares as ordered  -Follow-up with podiatry and vascular surgery as directed  -There is mention of him being on Bactrim in the notes but was not on the med list.   -Requested med list from his assisted living    Type 2 diabetes mellitus with other circulatory complication, with long-term current use of insulin (H)  Current regimen unclear.  The patient is on Victoza 1.2 mg subcutaneously 1 time a day.  He has orders for glipizide 10 mg 2 times a day.  There is mention of him being on glargine and the dose was reduced to 10 units recently in the note from his primary NP.     Essential hypertension  Currently on losartan    Heart failure with preserved ejection fraction, NYHA class II (H)  No current concerns.  Continues on losartan and spironolactone     Stage 3b chronic kidney disease (H)  Creatinine   Date Value Ref Range Status   01/17/2023 1.61 (H) 0.67 - 1.17 mg/dL Final   06/30/2018 1.20 0.66 - 1.25 mg/dL Final   Monitor as needed.  Keep kidney function in mind with  medication changes and illness      Orders:  Check blood sugars 4 times daily diagnosis DM2  Change urea cream to apply to both legs daily  Add bactrim DS 1 tab po BID through 1/24.   Admission coordinator is working on getting MAR from facility.     45 MINUTES SPENT BY ME on the date of service doing chart review, history, exam, documentation & further activities per the note.     Electronically signed by: Kellen Eid NP          Sincerely,        Kellen Eid NP

## 2023-01-18 NOTE — PROGRESS NOTES
[facility]  PRIMARY CARE PROVIDER AND CLINIC:  [facility], [address] [place] [id number]  Chief Complaint   Patient presents with     Hospital F/U     [place] Medical Record Number:  [id number]  Place of Service where encounter took place:  [facility] ([facility]) [[id number]]    Ever Crane  is a 77 year old  (1945), admitted to the above facility from  [facility]. Hospital stay 1/17/23 through 1/17/23..  Admitted to this facility for  rehab, medical management and nursing care.    HPI:    HPI information obtained from: facility chart records, facility staff, patient report and [facility] chart review.   Brief Summary of Hospital Course:   * Pt with PMH notable for diabetic ulceration digits 2-5 Rt foot, stage 3 pressure ulceration of bilateral heels, ulceration left leg, OM Rt foot (4th toe PIP and DIP, 5th toe DIP), T2D and PAD. Was seen by LINO Spears on Jan 13th and transmetatarsal amputation with achillis tendon lengthening recommended. Recent EVELYNE showed poor perfusion to Rt foot.      Today:  - reports saw vascular team today.   - reports heels hurts since debridement. Has been wrapping it differently on different days, and hope it will get better.     RN reports came with lantus 17 UTIs for one day. When went to ED no new came. Pt has not been taking lantus since admission. BGs has been fine.   --------------------------------------------------------------------  CODE STATUS/ADVANCE DIRECTIVES DISCUSSION:   CPR/Full code   Patient's living condition: lives in an assisted living facility  ALLERGIES: Doxycycline, Latex, Penicillin v, and Penicillins  PAST MEDICAL HISTORY:  has a past medical history of A-fib (H), MESHA (acute kidney injury) (H), Atopic keratoconjunctivitis, Atrial fibrillation (H), Atrial flutter (H), Mcgarry's esophagus (1/1/2012), Bilateral lower leg cellulitis (8/31/2018), Candidiasis of perineum  (1/3/2018), Carotid stenosis, asymptomatic, right, CHF (congestive heart failure) (H), Cholecystitis, acute (8/18/2019), Closed nondisplaced intertrochanteric fracture of left femur with routine healing, subsequent encounter (5/18/2022), Coronary artery disease due to lipid rich plaque (2000), Diabetes (H), Diabetic ulcer of both feet (H) (10/31/2017), Dyslexia, Dyslipidemia, goal LDL below 70 (2000), Epistaxis, Essential hypertension, Gangrene of left foot (H), GERD (gastroesophageal reflux disease), HLD (hyperlipidemia), HTN (hypertension), Hyponatremia, MRSA (methicillin resistant Staphylococcus aureus), Neuropathy, Non-STEMI (non-ST elevated myocardial infarction) (H), Other atopic dermatitis, Peripheral vascular disease (H), Pneumonia (6/26/2018), Type 2 diabetes mellitus, without long-term current use of insulin (H), and Unable to function independently (11/13/2017).  PAST SURGICAL HISTORY:   has a past surgical history that includes cabg measures grp; IR Extremity Angiogram Bilateral (11/3/2017); Bypass graft artery coronary (N/A, 03/06/2000); Cardioversion (08/25/2011); Amputate foot (Left, 11/05/2017); Inguinal Hernia Repair (Bilateral, 1969); Cv Coronary Angiogram (N/A, 10/01/2018); Laparoscopic cholecystectomy (N/A, 08/18/2019); and Bypass graft artery coronary (N/A, 10/04/2018).  FAMILY HISTORY: family history includes CABG in his father; Esophageal Cancer (age of onset: 58.00) in his father; No Known Problems in his grandchild, grandchild, sister, sister, and son; Obesity in his sister; Osteoarthritis in his sister; Sudden Death (age of onset: 85.00) in his mother; Valvular heart disease in his father.  SOCIAL HISTORY:   reports that he quit smoking about 22 years ago. His smoking use included cigarettes. He started smoking about 58 years ago. He has a 36.00 pack-year smoking history. He has never used smokeless tobacco. He reports current alcohol use of about 5.0 standard drinks per week. He reports  that he does not use drugs.    Post Discharge Medication Reconciliation Status: discharge medications reconciled and changed, per note/orders  Current Outpatient Medications   Medication Sig Dispense Refill     acetaminophen (TYLENOL) 325 MG tablet Take 650 mg by mouth every 6 hours       albuterol (2.5 MG/3ML) 0.083% neb solution Take 2.5 mg by nebulization every 6 hours as needed       albuterol (PROAIR HFA/PROVENTIL HFA/VENTOLIN HFA) 108 (90 Base) MCG/ACT inhaler Inhale 2 puffs into the lungs 2 times daily And q4h PRN       alum & mag hydroxide-simethicone (MAALOX) 200-200-20 MG/5ML SUSP suspension Take 30 mLs by mouth every 4 hours as needed for indigestion       bisacodyl (DULCOLAX) 5 MG EC tablet Take 5 mg by mouth daily as needed for constipation       calcium polycarbophil (FIBERCON) 625 MG tablet Take 1 tablet by mouth daily       celecoxib (CELEBREX) 200 MG capsule Take 200 mg by mouth daily       cetirizine (ZYRTEC) 10 MG tablet Take 10 mg by mouth daily       cyanocobalamin (VITAMIN B-12) 2500 MCG SUBL sublingual tablet Place 2,500 mcg under the tongue daily       glipiZIDE (GLUCOTROL) 10 MG tablet Take 1 tablet (10 mg) by mouth every morning (before breakfast) 30 tablet 0     glipiZIDE (GLUCOTROL) 5 MG tablet Take 3 tablets (15 mg) by mouth every evening 30 tablet 0     guaiFENesin (MUCINEX) 600 MG 12 hr tablet Take 600 mg by mouth 2 times daily       insulin glargine (LANTUS PEN) 100 UNIT/ML pen Inject 26 Units Subcutaneous At Bedtime       ipratropium (ATROVENT) 0.06 % nasal spray Spray 1 spray into both nostrils 4 times daily as needed for rhinitis       ketoconazole (NIZORAL) 2 % external shampoo Apply topically twice a week Mon and Fri.       liraglutide (VICTOZA) 18 MG/3ML solution Inject 0.6 mg Subcutaneous daily       mineral oil-hydrophilic petrolatum (AQUAPHOR) external ointment Apply topically 2 times daily       nitroGLYcerin (NITROSTAT) 0.4 MG sublingual tablet Place 0.4 mg under the tongue  "every 5 minutes as needed       omeprazole (PRILOSEC) 20 MG CR capsule Take 20 mg by mouth daily       polyethylene glycol (MIRALAX) 17 g packet Take 1 packet by mouth daily as needed for constipation       rivaroxaban ANTICOAGULANT (XARELTO) 15 MG TABS tablet Take by mouth daily (with dinner)       senna-docusate (SENOKOT-S/PERICOLACE) 8.6-50 MG tablet Take 1 tablet by mouth 2 times daily And BID PRN       simvastatin (ZOCOR) 40 MG tablet Take 40 mg by mouth At Bedtime       spironolactone (ALDACTONE) 25 MG tablet Take 25 mg by mouth daily       sulfamethoxazole-trimethoprim (BACTRIM DS) 800-160 MG tablet Take 1 tablet by mouth 2 times daily 20 tablet 0     triamcinolone (KENALOG) 0.1 % cream Apply 1 Application topically daily       urea (CARMOL) 10 % cream Apply 1 Application topically daily as needed       ROS: 10 point ROS of systems including Constitutional, Eyes, Respiratory, Cardiovascular, Gastroenterology, Genitourinary, Integumentary, Musculoskeletal, Psychiatric were all negative except for pertinent positives noted in my HPI.    Vitals:  BP (!) 147/74   Pulse 71   Temp 97.9  F (36.6  C)   Resp 20   Ht 1.778 m (5' 10\")   Wt 88.1 kg (194 lb 3.2 oz)   SpO2 96%   BMI 27.86 kg/m    Exam:  GENERAL APPEARANCE:  in no distress,   RESP:  Unlabored breathing. CTA b/l.   CV:  S1S2 audible, regular HR, no murmur appreciated.   ABDOMEN:  soft, NT/ND, BS audible.   M/S:   no joint deformity noted on observation.   SKIN:  No rash noted on observation  NEURO:   No NFD appreciated on observation.   PSYCH:  affect and mood normal      Lab/Diagnostic data: Reviewed in the chart and EHR.      ASSESSMENT/PLAN:  -------------------------------  * Pt with PMH notable for diabetic ulceration digits 2-5 Rt foot, stage 3 pressure ulceration of bilateral heels, ulceration left leg, OM Rt foot (4th toe PIP and DIP, 5th toe DIP), T2D and PAD. Was seen by LINO Spears on Jan 13th and s/p transmetatarsal amputation with achillis " tendon lengthening recommended. Recent EVELYNE showed poor perfusion to Rt foot.        - saw vascular team today, ASA started, statin increased to max, and will have angiogram.   - analgesia optimal  - wound care  - podiatrist follow up      T2D, with PAD, currently not on insulin (H)  - HbA1C 7.0% (July 2022)  - on Victoza. Start glargine based on BG keep A1c around 7  - On Xarelto, statin for PAD. Continue.       Hx of CABG:   Persistent Atrial Fibrillation:   - HFpEF (H)  - CVR, not on rate on rhythm control. No concern,   - compensated.   .  COPD, unspecified: respiratory wise compensated.       Electronically signed by:  Isabel Stephens MD

## 2023-01-19 ENCOUNTER — OFFICE VISIT (OUTPATIENT)
Dept: VASCULAR SURGERY | Facility: CLINIC | Age: 78
End: 2023-01-19
Attending: SURGERY
Payer: MEDICARE

## 2023-01-19 ENCOUNTER — TRANSITIONAL CARE UNIT VISIT (OUTPATIENT)
Dept: GERIATRICS | Facility: CLINIC | Age: 78
End: 2023-01-19
Payer: MEDICARE

## 2023-01-19 ENCOUNTER — TELEPHONE (OUTPATIENT)
Dept: VASCULAR SURGERY | Facility: CLINIC | Age: 78
End: 2023-01-19

## 2023-01-19 VITALS
OXYGEN SATURATION: 90 % | DIASTOLIC BLOOD PRESSURE: 64 MMHG | RESPIRATION RATE: 18 BRPM | HEART RATE: 72 BPM | BODY MASS INDEX: 28.84 KG/M2 | WEIGHT: 201 LBS | SYSTOLIC BLOOD PRESSURE: 138 MMHG

## 2023-01-19 VITALS
SYSTOLIC BLOOD PRESSURE: 147 MMHG | HEIGHT: 70 IN | DIASTOLIC BLOOD PRESSURE: 74 MMHG | OXYGEN SATURATION: 96 % | RESPIRATION RATE: 20 BRPM | BODY MASS INDEX: 27.8 KG/M2 | TEMPERATURE: 97.9 F | WEIGHT: 194.2 LBS | HEART RATE: 71 BPM

## 2023-01-19 DIAGNOSIS — I70.25 ATHEROSCLEROSIS OF NATIVE ARTERIES OF OTHER EXTREMITIES WITH ULCERATION (H): ICD-10-CM

## 2023-01-19 DIAGNOSIS — G63 POLYNEUROPATHY ASSOCIATED WITH UNDERLYING DISEASE (H): ICD-10-CM

## 2023-01-19 DIAGNOSIS — M86.9 OSTEOMYELITIS OF RIGHT FOOT, UNSPECIFIED TYPE (H): Primary | ICD-10-CM

## 2023-01-19 DIAGNOSIS — I48.19 PERSISTENT ATRIAL FIBRILLATION (H): ICD-10-CM

## 2023-01-19 DIAGNOSIS — E08.621 DIABETIC ULCER OF TOE OF RIGHT FOOT ASSOCIATED WITH DIABETES MELLITUS DUE TO UNDERLYING CONDITION, LIMITED TO BREAKDOWN OF SKIN (H): ICD-10-CM

## 2023-01-19 DIAGNOSIS — I73.9 PAD (PERIPHERAL ARTERY DISEASE) (H): Primary | ICD-10-CM

## 2023-01-19 DIAGNOSIS — I25.728 CORONARY ARTERY DISEASE OF AUTOLOGOUS BYPASS GRAFT WITH STABLE ANGINA PECTORIS (H): ICD-10-CM

## 2023-01-19 DIAGNOSIS — L97.511 DIABETIC ULCER OF TOE OF RIGHT FOOT ASSOCIATED WITH DIABETES MELLITUS DUE TO UNDERLYING CONDITION, LIMITED TO BREAKDOWN OF SKIN (H): ICD-10-CM

## 2023-01-19 DIAGNOSIS — Z89.439: ICD-10-CM

## 2023-01-19 DIAGNOSIS — J44.9 CHRONIC OBSTRUCTIVE PULMONARY DISEASE, UNSPECIFIED COPD TYPE (H): ICD-10-CM

## 2023-01-19 PROCEDURE — 99305 1ST NF CARE MODERATE MDM 35: CPT | Performed by: FAMILY MEDICINE

## 2023-01-19 PROCEDURE — G0463 HOSPITAL OUTPT CLINIC VISIT: HCPCS | Performed by: SURGERY

## 2023-01-19 PROCEDURE — 99214 OFFICE O/P EST MOD 30 MIN: CPT | Performed by: SURGERY

## 2023-01-19 ASSESSMENT — PAIN SCALES - GENERAL: PAINLEVEL: WORST PAIN (10)

## 2023-01-19 NOTE — LETTER
1/19/2023        RE: Ever Crane  [address]  [address]  [facility] [address]        [facility]  PRIMARY CARE PROVIDER AND CLINIC:  [facility], [address] [address] / [facility] [address]  Chief Complaint   Patient presents with     Hospital F/U     [facility] Medical Record Number:  [id number]  Place of Service where encounter took place:  [facility] (TCU) [[id number]]    Ever Crane  is a 77 year old  (1945), admitted to the above facility from  [facility]. Hospital stay 1/17/23 through 1/17/23..  Admitted to this facility for  rehab, medical management and nursing care.    HPI:    HPI information obtained from: facility chart records, facility staff, patient report and [facility] chart review.   Brief Summary of Hospital Course:   * Pt with PMH notable for diabetic ulceration digits 2-5 Rt foot, stage 3 pressure ulceration of bilateral heels, ulceration left leg, OM Rt foot (4th toe PIP and DIP, 5th toe DIP), T2D and PAD. Was seen by LINO Spears on Jan 13th and transmetatarsal amputation with achillis tendon lengthening recommended. Recent EVELYNE showed poor perfusion to Rt foot.      Today:  - reports saw vascular team today.   - reports heels hurts since debridement. Has been wrapping it differently on different days, and hope it will get better.     RN reports came with lantus 17 UTIs for one day. When went to ED no new came. Pt has not been taking lantus since admission. BGs has been fine.   --------------------------------------------------------------------  CODE STATUS/ADVANCE DIRECTIVES DISCUSSION:   CPR/Full code   Patient's living condition: lives in an assisted living facility  ALLERGIES: Doxycycline, Latex, Penicillin v, and Penicillins  PAST MEDICAL HISTORY:  has a past medical history of A-fib (H), MESHA (acute kidney injury) (H), Atopic keratoconjunctivitis, Atrial fibrillation (H), Atrial flutter (H),  Mcgarry's esophagus (1/1/2012), Bilateral lower leg cellulitis (8/31/2018), Candidiasis of perineum (1/3/2018), Carotid stenosis, asymptomatic, right, CHF (congestive heart failure) (H), Cholecystitis, acute (8/18/2019), Closed nondisplaced intertrochanteric fracture of left femur with routine healing, subsequent encounter (5/18/2022), Coronary artery disease due to lipid rich plaque (2000), Diabetes (H), Diabetic ulcer of both feet (H) (10/31/2017), Dyslexia, Dyslipidemia, goal LDL below 70 (2000), Epistaxis, Essential hypertension, Gangrene of left foot (H), GERD (gastroesophageal reflux disease), HLD (hyperlipidemia), HTN (hypertension), Hyponatremia, MRSA (methicillin resistant Staphylococcus aureus), Neuropathy, Non-STEMI (non-ST elevated myocardial infarction) (H), Other atopic dermatitis, Peripheral vascular disease (H), Pneumonia (6/26/2018), Type 2 diabetes mellitus, without long-term current use of insulin (H), and Unable to function independently (11/13/2017).  PAST SURGICAL HISTORY:   has a past surgical history that includes cabg measures grp; IR Extremity Angiogram Bilateral (11/3/2017); Bypass graft artery coronary (N/A, 03/06/2000); Cardioversion (08/25/2011); Amputate foot (Left, 11/05/2017); Inguinal Hernia Repair (Bilateral, 1969); Cv Coronary Angiogram (N/A, 10/01/2018); Laparoscopic cholecystectomy (N/A, 08/18/2019); and Bypass graft artery coronary (N/A, 10/04/2018).  FAMILY HISTORY: family history includes CABG in his father; Esophageal Cancer (age of onset: 58.00) in his father; No Known Problems in his grandchild, grandchild, sister, sister, and son; Obesity in his sister; Osteoarthritis in his sister; Sudden Death (age of onset: 85.00) in his mother; Valvular heart disease in his father.  SOCIAL HISTORY:   reports that he quit smoking about 22 years ago. His smoking use included cigarettes. He started smoking about 58 years ago. He has a 36.00 pack-year smoking history. He has never used  smokeless tobacco. He reports current alcohol use of about 5.0 standard drinks per week. He reports that he does not use drugs.    Post Discharge Medication Reconciliation Status: discharge medications reconciled and changed, per note/orders  Current Outpatient Medications   Medication Sig Dispense Refill     acetaminophen (TYLENOL) 325 MG tablet Take 650 mg by mouth every 6 hours       albuterol (2.5 MG/3ML) 0.083% neb solution Take 2.5 mg by nebulization every 6 hours as needed       albuterol (PROAIR HFA/PROVENTIL HFA/VENTOLIN HFA) 108 (90 Base) MCG/ACT inhaler Inhale 2 puffs into the lungs 2 times daily And q4h PRN       alum & mag hydroxide-simethicone (MAALOX) 200-200-20 MG/5ML SUSP suspension Take 30 mLs by mouth every 4 hours as needed for indigestion       bisacodyl (DULCOLAX) 5 MG EC tablet Take 5 mg by mouth daily as needed for constipation       calcium polycarbophil (FIBERCON) 625 MG tablet Take 1 tablet by mouth daily       celecoxib (CELEBREX) 200 MG capsule Take 200 mg by mouth daily       cetirizine (ZYRTEC) 10 MG tablet Take 10 mg by mouth daily       cyanocobalamin (VITAMIN B-12) 2500 MCG SUBL sublingual tablet Place 2,500 mcg under the tongue daily       glipiZIDE (GLUCOTROL) 10 MG tablet Take 1 tablet (10 mg) by mouth every morning (before breakfast) 30 tablet 0     glipiZIDE (GLUCOTROL) 5 MG tablet Take 3 tablets (15 mg) by mouth every evening 30 tablet 0     guaiFENesin (MUCINEX) 600 MG 12 hr tablet Take 600 mg by mouth 2 times daily       insulin glargine (LANTUS PEN) 100 UNIT/ML pen Inject 26 Units Subcutaneous At Bedtime       ipratropium (ATROVENT) 0.06 % nasal spray Spray 1 spray into both nostrils 4 times daily as needed for rhinitis       ketoconazole (NIZORAL) 2 % external shampoo Apply topically twice a week Mon and Fri.       liraglutide (VICTOZA) 18 MG/3ML solution Inject 0.6 mg Subcutaneous daily       mineral oil-hydrophilic petrolatum (AQUAPHOR) external ointment Apply topically  "2 times daily       nitroGLYcerin (NITROSTAT) 0.4 MG sublingual tablet Place 0.4 mg under the tongue every 5 minutes as needed       omeprazole (PRILOSEC) 20 MG CR capsule Take 20 mg by mouth daily       polyethylene glycol (MIRALAX) 17 g packet Take 1 packet by mouth daily as needed for constipation       rivaroxaban ANTICOAGULANT (XARELTO) 15 MG TABS tablet Take by mouth daily (with dinner)       senna-docusate (SENOKOT-S/PERICOLACE) 8.6-50 MG tablet Take 1 tablet by mouth 2 times daily And BID PRN       simvastatin (ZOCOR) 40 MG tablet Take 40 mg by mouth At Bedtime       spironolactone (ALDACTONE) 25 MG tablet Take 25 mg by mouth daily       sulfamethoxazole-trimethoprim (BACTRIM DS) 800-160 MG tablet Take 1 tablet by mouth 2 times daily 20 tablet 0     triamcinolone (KENALOG) 0.1 % cream Apply 1 Application topically daily       urea (CARMOL) 10 % cream Apply 1 Application topically daily as needed       ROS: 10 point ROS of systems including Constitutional, Eyes, Respiratory, Cardiovascular, Gastroenterology, Genitourinary, Integumentary, Musculoskeletal, Psychiatric were all negative except for pertinent positives noted in my HPI.    Vitals:  BP (!) 147/74   Pulse 71   Temp 97.9  F (36.6  C)   Resp 20   Ht 1.778 m (5' 10\")   Wt 88.1 kg (194 lb 3.2 oz)   SpO2 96%   BMI 27.86 kg/m    Exam:  GENERAL APPEARANCE:  in no distress,   RESP:  Unlabored breathing. CTA b/l.   CV:  S1S2 audible, regular HR, no murmur appreciated.   ABDOMEN:  soft, NT/ND, BS audible.   M/S:   no joint deformity noted on observation.   SKIN:  No rash noted on observation  NEURO:   No NFD appreciated on observation.   PSYCH:  affect and mood normal      Lab/Diagnostic data: Reviewed in the chart and EHR.      ASSESSMENT/PLAN:  -------------------------------  * Pt with PMH notable for diabetic ulceration digits 2-5 Rt foot, stage 3 pressure ulceration of bilateral heels, ulceration left leg, OM Rt foot (4th toe PIP and DIP, 5th toe " DIP), T2D and PAD. Was seen by LINO Spears on Jan 13th and s/p transmetatarsal amputation with achillis tendon lengthening recommended. Recent EVELYNE showed poor perfusion to Rt foot.        - saw vascular team today, ASA started, statin increased to max, and will have angiogram.   - analgesia optimal  - wound care  - podiatrist follow up      T2D, with PAD, currently not on insulin (H)  - HbA1C 7.0% (July 2022)  - on Victoza. Start glargine based on BG keep A1c around 7  - On Xarelto, statin for PAD. Continue.       Hx of CABG:   Persistent Atrial Fibrillation:   - HFpEF (H)  - CVR, not on rate on rhythm control. No concern,   - compensated.   .  COPD, unspecified: respiratory wise compensated.       Electronically signed by:  Isabel Stephens MD          Sincerely,        Isabel Stephens MD

## 2023-01-19 NOTE — PATIENT INSTRUCTIONS
Ever Crane,    Your visit to [facility] for your procedure is coming soon and we look forward to seeing you! This friendly reminder and pre-procedure checklist will help to ensure your procedure goes smoothly and meets your expectations. At [facility], our goal is to provide you with a great patient experience and to deliver genuine, professional care to every patient.     Please complete all the steps in advance of your visit. If you have any questions about the items listed below, please give our office a call. We can be reached at [phone] or visit our website at [email or web address] for more information.     Procedure: Right Leg Angiogram     Procedure Date :  Wednesday January 25th    Procedure Time :  12pm    Arrival Time: 11am    Admission Type: Outpatient    Procedure Location: [facility]:  [address] [place] [address] (phone: [phone], Fax: [phone])    Surgeon: MD Naveen Hamm        2 week Post Procedure Appointment with Dr Marcelo Hamm and also ultrasound: TBD        PLEASE DO NOT STOP YOUR ASPIRIN OR PLAVIX UNLESS SPECIFICALLY DIRECTED BY THE VASCULAR SURGEON TO STOP!  In most cases Vascular surgeons want you to continue these. This is different from most NON vascular surgeries and may not be well known by your Primary Care Provider    If you take blood thinners: SEE SPECIFIC INSTRUCTIONS BELOW      In most cases Vascular providers want you to continue these. This is different from most NON vascular surgeries and may not be well known by your Primary Care Provider  Xarelto: Hold 3 days before the procedure HOLD STARTING Sunday January 22ND      Prepare for the peripheral angiography as follows:  Do not eat 8 hours before your procedure. You may have clear liquids up to 2 hours before surgery.  If your provider says to take your normal medicines, swallow them with only small  sips of water.  Tell your healthcare provider about all medicines you take and any allergies you may have.  Arrange for a family member or friend to drive you home.  If you are on insulin, please take only 1/2 the dose the night before and HOLD the day of the procedure.   If you are on Metformin, please HOLD for 48 hours after the procedure.     Peripheral Angiography    Peripheral angiography is an outpatient procedure that makes a  map  of the vessels (arteries) in your lower body, legs, and arms, using X-ray and dye.This map can show where blood flow may be blocked.    An angiogram is commonly performed under sedation with the use of local anesthesia.    The procedure usually starts with a needle put into the femoral (groin) artery. From one treatment site, areas all over the body can be treated.  After access is established, catheters (thin tubes) and wires are threaded through the arterial system to a specific area of interest or throughout the entire body.  As a contrast agent (iodine dye) is injected, X-ray images are taken to let your provider view the flow of the dye and identify blockages. The surgeon can then choose the best mode of therapy for you - whether during or following the angiogram. This decision depends on your symptoms and the severity and characteristics of the blockages.  Two common therapies that can be provided during the angiogram are balloon angioplasty and stent placement.                   Angioplasty can be used to open arterial blockages. Guided by X-ray, your provider navigates through the blockage with a wire and introduces a special device equipped with an inflatable balloon. After positioning the balloon device across the blocked portion of the artery, the provider inflates the balloon to expand the artery and compress the blockage. The balloon is then deflated and removed while keeping the wire in place across the area that has been treated. Next, contrast dye is injected to  assess the result. Treatment is considered a success if blood flow is improved and less than 30% of the blockage remains. If the vessel is still considerably narrowed, placing a stent may be the next step.    Stents are used to prop open an artery at the site of a narrowing. Stents are generally placed after balloon angioplasty when there is residual narrowing or insufficient blood flow in a treated vessel. Stents are considered a permanent implant and cannot be used if you have a metal allergy. Stents that are used in the leg are constructed of a nickel-titanium alloy (Nitinol), a memory-shaped metal. This alloy has a predetermined size and shape at body temperature and expands to this size and shape after being introduced through a catheter. These stents resist kinking and are flexible so that damage from activities that involve your legs is minimized.  Your procedure may require other techniques to address the problem or plaque.     If surgery is felt to be a better option, your vascular provider will obtain any additional X-ray images needed to plan a surgical bypass of the blocked vessel/s and will then conclude the angiogram.      During the procedure  Here is what to expect:  You may get medicine through an IV (intravenous) line to relax you. You re given an injection to numb the insertion site. Then, a tiny skin cut (incision) is made near an artery in your groin.  Your provider inserts a thin tube (catheter) through the incision. He or she then threads the catheter into an artery while looking at a video monitor.  Contrast  dye  is injected into the catheter to confirm position. You may feel warmth or pressure in your legs and back. You lie still as X-rays are taken. The catheter is then taken out.  After the procedure  You ll be taken to a recovery area. A healthcare provider will apply pressure to the site for about 10 minutes. Your healthcare provider will tell you how long to lie down and keep the  insertion site still. Your healthcare provider will discuss the results with you soon after the procedure.      Angiogram Procedure Discharge Instructions:     1. If you received sedation for your procedure: Do not drive or operate heavy machinery for the rest of the day.  2. Avoid strenuous activity for 72 hours (3 days):                        - Do not lift greater than 10 pounds.                        - Excessive exercise                        - Straining                        - Return to your normal activities as you tolerate after the 3 days restriction  3. Avoid tub baths, Jacuzzis, hot tubs and pools for 72 hours (3 days) or until puncture site is will healed.  4. You may shower beginning tomorrow. Do not scrub puncture site(s) until well healed, pat dry.  5. You can expect to return to work 1-2 days after your procedure - depending on the nature of your profession.  6. It is normal to have some tenderness and minimal swelling at puncture site. A small area of discoloration may be present. Tenderness typically subsides in 24-48 hours. A small knot may also be present at puncture site for 6-8 weeks, this can be a normal part of the healing process.       After the angiogram If you:      1. Experience any bleeding or active swelling from puncture site: Lie down, firmly apply pressure to puncture site and CALL 9-1-1  2. Fever greater than 101 degrees Fahrenheit.  3. Redness, swelling, warmth to touch, or purulent (yellow/green/foul smelling) drainage from the puncture site.  4. Increasing pain, tenderness or swelling at puncture site OR of arm/leg near puncture site.  5. Feeling weak or faint.  6. Change in color, temperature, or sensation of arm/leg where puncture was made.  7. You can t feel your thrill (pulse at your dialysis fistula site) or it feels weak (If you had fistulogram done).     Call us with any other questions or concerns after your procedure: [phone]      All invasive procedures can have  "complications. While the risk of an angiogram is low it is not zero. The most common complications are related to the arterial access site.       Risks/ Complications   Bruising is Common  You will likely have bruising (ecchymosis) where the artery was entered.    Pain and Bleeding  Less commonly, patients experience pain and bleeding that may include blood collecting under the skin (hematoma).    Blockage and Leakage   In rare cases, the access artery can become blocked. Infrequently, patients experience persistent leakage of blood where the artery was entered, which can result in the formation of a pseudoaneurysm--a blood-filled sac--that may require further treatment.  Other complications related to an angiogram include:   Allergic reaction to the iodine contrast dye, which can lead to the development of kidney failure.  Very rarely during balloon angioplasty and/or stent placement, part of the arterial blockage can break off (embolism) and travel to more distant arteries. This can worsen blood flow.        Notify our office right away, if you have any changes in your health status, or if you develop a cold, flu, diarrhea, infection, fever or sore throat before your scheduled surgery date. We can be reached at [phone] Monday-Friday 8 am-4:30 pm if you have any questions.   Thank you for choosing [facility]      [] PCR COVID-19 test is required within 4 days of surgery  If your test is positive or you fail to get tested, your surgery will be postponed.     [] Contact your insurance regarding coverage  If you would like a Good Ailin Estimate for your upcoming procedure at [facility], contact Cost of Care Estimates   Advocates are available Monday through Friday 8am - 5pm   [phone]  You may also submit a request online at [email or web address]  - Complete the secure online form found under \"Services and Procedure Pricing\"   If your procedure is at [facility] " Center please contact the numbers below for Cost of Care Estimates.   - Facility Charge: [phone]    Anesthesiology charge:  [phone]      [] DO NOT BRING FMLA WITH TO SURGERY.  These should be sent to the provider's office by fax to [phone].     [] Day of Surgery  Medications - Take as indicated with sips of water.   Wear comfortable loose fitting clothes. Wear your glasses-Not contacts. Do not wear jewelry and remove body piercing's. Surgery may be cancelled if they are not removed.   You may have 1 family member wait in the lobby during your surgery. Visitor restrictions are subject to change. Please verify with the surgery nurse when they call.   If same day surgery-Have a someone come with you to surgery that can help you understand the surgeon's instructions, drive you home and stay with you overnight the first night.    [] If the community sees a new COVID-19 surge, your procedure may need to be postponed. We will contact you if this happens.    [] You will receive a call from a surgery nurse 1-3 days prior to surgery. They will go over more details with you.       Before Your Procedure or Hospital Admission Testing for COVID-19  Thank you for choosing [facility] for your health care needs. The COVID-19 pandemic is a very challenging time for everyone.   Our goal is to keep you and our team here at [facility] safe and healthy. We ve taken many steps to make this happen.   For example:   We test and screen our staff, care teams and patients for COVID-19.   Everyone at [facility] must wear a mask and stay 6 feet apart.   We are limiting hospital and clinic visitors.  Before you come in  You must get tested for COVID-19, even if you ve been vaccinated.   If you re going home on the same day as your procedure (surgery):  1 to 2 days before your procedure, take an at-home, rapid antigen test. You can buy these at many pharmacy stores. Or you can order free, at-home tests at  covid.gov/tests. If you   can t find an at-home, rapid antigen test, please schedule a PCR test with  [email or web address] by calling [phone], or visiting [email or web address]. We   can t accept tests that are more than 4 days old.   If your test is negative, please take a photo of the test. Show the photo to the nurse when you come in for your procedure.  If your test is positive, please see the  If your test shows you have COVID-19  section on this page.    If you re staying overnight in the hospital:  4 days before your procedure, please get a PCR test from a lab.   To schedule a PCR test with  [email or web address], call [phone]. Or, visit [email or web address].    If your test is negative, please ask the testing location to fax your results to us at [phone].  If your test is positive, please see the  If your test shows you have COVID-19  section below.    After your test and before your procedure, please follow these safety guidelines:   Limit trips outside your home.   Limit the number of people you see.   Always wear a face covering outside your home.   Use social distancing. Stay 6 feet away from others whenever you can.   Wash your hands often.    If your test shows you have COVID-19  If your test is positive, please tell your surgeon s office right away. A positive test means that you have COVID-19.  We ll probably have to postpone your admission, surgery or procedure. Your care team will discuss this with you. After that, we ll let you know what to   do and when you can re-schedule.      If your test shows you DON T have COVID-19.   Even if your test is negative, you can still get COVID-19. It s rare, but sometimes the test result is wrong. You could also catch the virus after taking   the test. There s a very small chance that you could catch COVID-19 in the hospital or surgery center. [facility] has taken many steps to prevent this from happening.    Possible surgery delay like you, we want your surgery to happen when it s scheduled. But sometimes the hospital is so full that it s not safe for you to have your surgery. This is   especially true during the pandemic. Your surgery may need to be re-scheduled at a later date. If this happens, we will call and tell you.   Day of your surgery or procedure   Please come wearing a face covering that covers both your nose and mouth.   When you arrive, we ll ask you some questions to find out if you ve had any exposures to COVID-19, or have any signs of COVID-19.   No matter where you took a test, we ll need to have the results. You can ask your testing location to fax the results to us at [phone]. If you   took a rapid antigen at-home test, you can bring a photo of the results.    Ask your care team if you can have visitors. All visitors must wear face coverings and will be screened for exposure to, or signs of, COVID-19.    The rules for visitors change often, depending on how much the virus is spreading. To learn more, see Visiting a Loved One in the Hospital during   the COVID-19 Outbreak.Please call your care team, hospital or surgery center if you have any questions. We thank you for your understanding and for choosing [facility]   for your care.   Questions and answers  Does it matter how I get tested for COVID-19?   Yes. If the plan is for you to go home on the same day as your procedure, you can take a rapid antigen, at-home test 1 to 2 days before you come in. If your test is negative, please take a photo of your test. Show it to the nurse when you come to the hospital. If you can t find a rapid antigen, at-home test, you can take a PCR test at a lab instead. If the plan is for you to stay in the hospital after your surgery, you ll need to get a PCR test from a lab 4 days before your procedure. Ask the testing location to fax your results to [phone]. If we don t get your results, we may have  "to delay or cancel your treatment.  Do I need to get tested at [facility]?  No. However, if you need a PCR test from a lab, we recommend using an [facility] lab. We ll get the results more quickly and easily that way. To schedule a test, please call [phone]. Or, visit [email or web address]      For informational purposes only. Not to replace the advice of your health care provider. Copyright   2020 [facility]. All rights reserved. Clinically reviewed by Infection Prevention and the [facility] COVID-19 Clinical Team.   Anytime DD [id number] - Rev 05/26/22.      Dressing Change lnstructions  NO COMPRESSION BILATERALLY AT THIS  TIME                       Right foot:  Apply dry gauze to right foot. Change daily/.     Left leg:                                                     3 TIMES PER WEEK and as needed, Cleanse your leg wound(s) with Normal saline.     Pat Dry with non-sterile gauze     Primary Dressing: Apply  Hydofera blue  into/onto the wounds     Secondary dressing: Cover with dry gauze     Secure with non-sterile roll gauze (4\" x 75\" roll) and tape (1\" roll tape) as needed; avoid adhesive directly on the skin     Bilateral Heels:     - Dressing Application of  Endoform     1. Endoform should be cut to the size of the wound.  It should touch the edges of the ulcer. It is okay if it overlap the edges a small amount.  Then, moisten the Endoform with saline.(If it is easier for you, you can moisten it before laying it in the wound)     2. Cover the wound with Endoform, followed by dry gauze, and secure with roll gauze if needed.      3. Endoform is naturally used by the body over time so you may not find it in the wound when you change your dressing.  If you do find some, gently cleanse the wound with saline. Do not remove the remaining Endoform, which may appear as an off-white to max gel, just add Endoform on top.  Usually, more Endoform will need to be " added every 5-7 days.      4. Change your top dressing every 1-2 days or as needed depending on drainage.     -Endoform is a collagen dressing created from ovine (sheep) fore-stomach tissue. The collagen extracellular matrix transforms into a soft conforming sheet, which is naturally incorporated into the wound over time.  Collagen dressings are used to stimulate wound healing.   ,            It IS NOT ok to get your wound wet in the bath or shower     SEEK MEDICAL CARE IF:  You have an increase in swelling, pain, or redness around the wound.  You have an increase in the amount of pus coming from the wound.  There is a bad smell coming from the wound.  The wound appears to be worsening/enlarging  You have a fever greater than 101.5 F        It is ok to continue current wound care treatment/products for the next 2-3 days until new wound care supplies are ordered and arrive. If longer than this please contact our office at [phone].         We want to hear from you!   In the next few weeks, you should receive a call or email to complete a survey about your visit at [facility]. Please help us improve your appointment experience by letting us know how we did today. We strive to make your experience good and value any ways in which we could do better.       We value your input and suggestions.     Thank you for choosing the [facility]!

## 2023-01-19 NOTE — NURSING NOTE
[facility]        Patient is here for a evaluation to discuss Abnormal EVELYNE's on right foot. Wounds on BLE being managed by Dr. Spears.    Pt is currently taking Statin, Xarelto and Antibiotics.    /64   Pulse 72   Resp 18   Wt 201 lb (91.2 kg)   SpO2 90%   BMI 28.84 kg/m      The provider has been notified that the patient has concerns of EVELYNE results.     Questions patient would like addressed today are: What is this appointment for?    Refills are needed: No    Has homecare services and agency name:  Yes: [facility]. Currently at Short term care facility at [facility] in [place].       Margaux Jeffries MA

## 2023-01-19 NOTE — PROGRESS NOTES
VASCULAR SURGERY PROGRESS NOTE   VASCULAR SURGEON: Naveen Hamm MD, RPVI     LOCATION:  [facility]     Ever Crane   Medical Record #:  [id number]  YOB: 1945  Age:  77 year old     Date of Service: 1/19/2023    PRIMARY CARE PROVIDER: Services, [facility]      Reason for visit: Right lower extremity chronic limb threatening ischemia    IMPRESSION: 77-year-old male with right lower extremity chronic limb threatening ischemia with osteomyelitis involving fourth and fifth toe.  There are also significant bilateral heel wounds most recent ABIs did show significant low toe pressures in the right foot.  The duplex did show monophasic flow in tibial and pedal vessels.  Patient does have a history of chronic kidney disease, diabetes, CAD.  He is not on optimal medical management therapy.    RECOMMENDATION/RISKS: Continue control risk factors such as diabetes, chronic kidney disease, hypertension.  We will add aspirin to his regimen.  We will increase statin to a max dose.  In the meantime we will proceed with right lower extremity angiogram with possible intervention.  We will schedule left lower extremity angiogram to follow    HPI:  Ever Crane is a 77 year old male who was seen today in consultation for right lower extremity nonhealing wound.  Patient denies any pain at rest, claudication.    REVIEW OF SYSTEMS:    A 12 point ROS was reviewed and is negative except for right leg wound    PHH:    Past Medical History:   Diagnosis Date     A-fib (H)      MESHA (acute kidney injury) (H)      Atopic keratoconjunctivitis      Atrial fibrillation (H)     Brian Moe: 8/2011 Cardioversion; CHADS2 VASC = 5; he is on warfarin and sotalol      Atrial flutter (H)      Mcgarry's esophagus 1/1/2012    per note of Dr. Tavo Mike of [facility]     Bilateral lower leg cellulitis 8/31/2018     Candidiasis of perineum 1/3/2018     Carotid stenosis, asymptomatic, right      CHF (congestive heart  failure) (H)      Cholecystitis, acute 8/18/2019     Closed nondisplaced intertrochanteric fracture of left femur with routine healing, subsequent encounter 5/18/2022     Coronary artery disease due to lipid rich plaque 2000    CABG x2     Diabetes (H)      Diabetic ulcer of both feet (H) 10/31/2017     Dyslexia      Dyslipidemia, goal LDL below 70 2000     Epistaxis      Essential hypertension      Gangrene of left foot (H)      GERD (gastroesophageal reflux disease)      HLD (hyperlipidemia)      HTN (hypertension)      Hyponatremia      MRSA (methicillin resistant Staphylococcus aureus)      Neuropathy      Non-STEMI (non-ST elevated myocardial infarction) (H)      Other atopic dermatitis      Peripheral vascular disease (H)      Pneumonia 6/26/2018     Type 2 diabetes mellitus, without long-term current use of insulin (H)      Unable to function independently 11/13/2017          Past Surgical History:   Procedure Laterality Date     AMPUTATE FOOT Left 11/05/2017    Procedure: LEFT TRANSMETATARSAL AMPUTATION;  Surgeon: Ever Wick MD;  Location: [facility];  Service:      BYPASS GRAFT ARTERY CORONARY N/A 03/06/2000    SVG to OM1, SVG to PDA     BYPASS GRAFT ARTERY CORONARY N/A 10/04/2018    redo CABG due to graft failure     CABG MEASURES GRP       CARDIOVERSION  08/25/2011     CV CORONARY ANGIOGRAM N/A 10/01/2018    Procedure: Coronary Angiogram;  Surgeon: Miki Mac MD;  Location: [facility];  Service:      INGUINAL HERNIA REPAIR Bilateral 1969    and 1979     IR EXTREMITY ANGIOGRAM BILATERAL  11/3/2017     LAPAROSCOPIC CHOLECYSTECTOMY N/A 08/18/2019    Procedure: CHOLECYSTECTOMY, LAPAROSCOPIC;  Surgeon: Stewart Fountain MD;  Location: [facility];  Service: General       ALLERGIES:  Doxycycline, Latex, Penicillin v, and Penicillins    MEDS:    Current Outpatient Medications:      acetaminophen (TYLENOL) 325 MG tablet, Take 650 mg by mouth every 6 hours, Disp: , Rfl:       albuterol (2.5 MG/3ML) 0.083% neb solution, Take 2.5 mg by nebulization every 6 hours as needed, Disp: , Rfl:      albuterol (PROAIR HFA/PROVENTIL HFA/VENTOLIN HFA) 108 (90 Base) MCG/ACT inhaler, Inhale 2 puffs into the lungs 2 times daily And q4h PRN, Disp: , Rfl:      alum & mag hydroxide-simethicone (MAALOX) 200-200-20 MG/5ML SUSP suspension, Take 30 mLs by mouth every 4 hours as needed for indigestion, Disp: , Rfl:      bisacodyl (DULCOLAX) 5 MG EC tablet, Take 5 mg by mouth daily as needed for constipation, Disp: , Rfl:      calcium polycarbophil (FIBERCON) 625 MG tablet, Take 1 tablet by mouth daily, Disp: , Rfl:      celecoxib (CELEBREX) 200 MG capsule, Take 200 mg by mouth daily, Disp: , Rfl:      cetirizine (ZYRTEC) 10 MG tablet, Take 10 mg by mouth daily, Disp: , Rfl:      cyanocobalamin (VITAMIN B-12) 2500 MCG SUBL sublingual tablet, Place 2,500 mcg under the tongue daily, Disp: , Rfl:      glipiZIDE (GLUCOTROL) 10 MG tablet, Take 1 tablet (10 mg) by mouth every morning (before breakfast), Disp: 30 tablet, Rfl: 0     glipiZIDE (GLUCOTROL) 5 MG tablet, Take 3 tablets (15 mg) by mouth every evening, Disp: 30 tablet, Rfl: 0     guaiFENesin (MUCINEX) 600 MG 12 hr tablet, Take 600 mg by mouth 2 times daily, Disp: , Rfl:      insulin glargine (LANTUS PEN) 100 UNIT/ML pen, Inject 26 Units Subcutaneous At Bedtime, Disp: , Rfl:      ipratropium (ATROVENT) 0.06 % nasal spray, Spray 1 spray into both nostrils 4 times daily as needed for rhinitis, Disp: , Rfl:      ketoconazole (NIZORAL) 2 % external shampoo, Apply topically twice a week Mon and Fri., Disp: , Rfl:      liraglutide (VICTOZA) 18 MG/3ML solution, Inject 0.6 mg Subcutaneous daily, Disp: , Rfl:      mineral oil-hydrophilic petrolatum (AQUAPHOR) external ointment, Apply topically 2 times daily, Disp: , Rfl:      nitroGLYcerin (NITROSTAT) 0.4 MG sublingual tablet, Place 0.4 mg under the tongue every 5 minutes as needed, Disp: , Rfl:      omeprazole  (PRILOSEC) 20 MG CR capsule, Take 20 mg by mouth daily, Disp: , Rfl:      polyethylene glycol (MIRALAX) 17 g packet, Take 1 packet by mouth daily as needed for constipation, Disp: , Rfl:      rivaroxaban ANTICOAGULANT (XARELTO) 15 MG TABS tablet, Take by mouth daily (with dinner), Disp: , Rfl:      senna-docusate (SENOKOT-S/PERICOLACE) 8.6-50 MG tablet, Take 1 tablet by mouth 2 times daily And BID PRN, Disp: , Rfl:      simvastatin (ZOCOR) 40 MG tablet, Take 40 mg by mouth At Bedtime, Disp: , Rfl:      spironolactone (ALDACTONE) 25 MG tablet, Take 25 mg by mouth daily, Disp: , Rfl:      sulfamethoxazole-trimethoprim (BACTRIM DS) 800-160 MG tablet, Take 1 tablet by mouth 2 times daily, Disp: 20 tablet, Rfl: 0     triamcinolone (KENALOG) 0.1 % cream, Apply 1 Application topically daily, Disp: , Rfl:      urea (CARMOL) 10 % cream, Apply 1 Application topically daily as needed, Disp: , Rfl:     SOCIAL HABITS:    History   Smoking Status     Former     Packs/day: 1.00     Years: 36.00     Types: Cigarettes, Cigarettes     Start date: 6/13/1964     Quit date: 4/30/2000   Smokeless Tobacco     Never     Social History    Substance and Sexual Activity      Alcohol use: Yes        Alcohol/week: 5.0 standard drinks        Types: 1 Cans of beer per week      History   Drug Use No       FAMILY HISTORY:    Family History   Problem Relation Age of Onset     Sudden Death Mother 85.00     CABG Father      Valvular heart disease Father      Esophageal Cancer Father 58.00        cause of death     No Known Problems Son      No Known Problems Sister      Obesity Sister      Osteoarthritis Sister      No Known Problems Sister      No Known Problems Grandchild      No Known Problems Grandchild        PE:  There were no vitals taken for this visit.  Wt Readings from Last 1 Encounters:   01/18/23 91.2 kg (201 lb)     There is no height or weight on file to calculate BMI.    EXAM:  GENERAL: This is a well-developed 77 year old male who  appears his stated age  EYES: Grossly normal.  MOUTH: Buccal mucosa normal   CARDIAC: Normal   CHEST/LUNG: Clear to auscultation bilaterally  GASTROINTESINAL soft nontender nondistended  MUSCULOSKELETAL: Grossly normal and both lower extremities are intact.  HEME/LYMPH: No lymphedema  NEUROLOGIC: Focally intact, Alert and oriented x 3.   PSYCH: appropriate affect  INTEGUMENT: No open lesions or ulcers  Pulse Exam: Monophasic signals present bilaterally          DIAGNOSTIC STUDIES:     Images:  US Low Ext Arterial Dop Seg Pres w/o Exercise    Result Date: 1/13/2023  Table formatting from the original result was not included. BILATERAL RESTING ANKLE-BRACHIAL INDICES (EVELYNE'S) (Date: 01/12/23) Indication:  Ulcerations digits 2-5 right foot and Osteomyelitis right foot Previous: 12/20/18 History: Previous Smoker, Hypertension, Diabetic, Hyperlipidemia, CAD, MI, Skin Color Change and Vascular Ulcers  Resting EVELYNE's          Right: mmHg Index     Brachial: 125  Ankle-(PT): 97 0.71 Ankle-(DP): 127 0.93          Digit: 33 0.24               Left: mmHg Index     Brachial: 136  Ankle-(PT): 122 0.90 Ankle-(DP): 85 0.63          Digit: TMA - Resting ankle-brachial index of 0.93 on the right. Toe Pressures of 33 mmHg and TBI of 0.24 Resting ankle-brachial index of 0.90 on the left. Toe Pressures of TMA mmHg and TBI of -  VPR WAVEFORMS: The right volume plethysmography waveforms are mildly abnormal at the lower thigh level, mildly abnormal at the upper calf level and mildly abnormal at the ankle. The left volume plethysmography waveforms are mildly abnormal at the lower thigh level, mildly abnormal at the upper calf level and mildly abnormal at the ankle.  Impression:  1. RIGHT LOWER EXTREMITY: EVELYNE is Normal with multiphasic waveforms with an EVELYNE of 0.93. Toe Pressures are Abnormal and impaired for wound healing with toe pressures of 33 mmHg. 2. LEFT LOWER EXTREMITY: EVELYNE is Normal with multiphasic waveforms with an EVELYNE of 0.90. Toe  Pressures Cannot be obtained due to history of TMA Reference: Wound classification Grade EVELYNE Ankle Systolic Pressure Toe Pressures 0 > 0.80 > 100 mmHg > 60 mmHg 1 0.6 - 0.79 70 - 100 mmHg 40 - 59 mmHg 2 0.4 - 0.59 50-70 mmHg 30 - 39 mmHg 3 < 0.39 < 50 mmHg < 30 mmHg Digit Pressures DBI Disease Category > 0.70 Normal < 0.70 Abnormal > 30 mmHg Potential wound healing < 30 mmHg Impaired wound healing Ankle Brachial Pressures EVELYNE Disease Category > 1.3  Likely vessel calcification with monophasic waveforms, non-diagnostic 0.95-1.30 Normal with multiphasic waveforms 0.50-0.95 Single level disease 0.30-0.50 Multilevel disease < 0.30 Critical limb ischema Volume Plethysmography Recording (VPR) at all levels Normal Sharp systolic peak, fast upstroke, prominent dicrotic notch in wave Mild Sharp systolic peak, fast upstroke, absent dicrotic notch in wave Moderate Flattened systolic peak, slowed upstroke, absent dicrotic notch inwave Severe amplitude wave with = upslope and down slope Occluded Flat Line     MR Foot Right w/o & w Contrast    Result Date: 1/9/2023  EXAM: MR FOOT RIGHT W/O and W CONTRAST LOCATION: [facility] DATE/TIME: 1/9/2023 7:27 PM INDICATION: Osteomyelitis digits 3,4,5 right foot. Foot pain. COMPARISON: None. TECHNIQUE: Routine. Additional postgadolinium T1 sequences were obtained. IV CONTRAST: Gadavist 9 mL FINDINGS: JOINTS AND BONES: -No fracture or contusion. There are changes of osteomyelitis involving the entire middle phalanx and majority of the proximal phalanx of the fourth toe as best seen on series 7 image 21 and at the fifth toe there are changes of osteomyelitis involving the middle and distal phalanges, as well as a distal portion of the proximal phalanx. Moderate degenerative changes at the first MTP joint. No effusion. TENDONS: -No tendon tear, tendinopathy, or tenosynovitis. LIGAMENTS: -Lisfranc ligament: Intact. No subluxation. MUSCLES AND SOFT TISSUES: -No abscess.  Marked global atrophy of the intrinsic musculature of the foot.     IMPRESSION: 1.  Changes of osteomyelitis involving the proximal and middle phalanges of the fourth toe and proximal, middle and distal phalanges of the fifth toe. 2.  No evidence of an abscess. 3.  Moderate degenerative changes at the first MTP joint.    US Lower Extremity Arterial Duplex Bilateral    Result Date: 1/13/2023  Table formatting from the original result was not included. Arterial Duplex Ultrasound (Date: 01/12/23) Lower Extremity Artery Evaluation Indication:  Ulcerations digits 2-5 right foot and Osteomyelitis right foot Previous: 11/3/17 History: Previous Smoker, Hypertension, Diabetic, Hyperlipidemia, CAD, MI, Skin Color Change and Vascular Ulcers Technique: Duplex imaging is performed utilizing gray-scale, two-dimensional images, and color-flow imaging. Doppler waveform analysis and spectral Doppler imaging is also performed. LOWER EXTREMITY ARTERIAL DUPLEX EXAM WITH WAVEFORMS Right Leg:(cm/s) Location: Velocities Waveforms EIA:   141  T CFA:   136  T PFA:   170  T SFA Proximal:   121  T SFA Mid:   136  T SFA Distal:   118  T Popliteal Artery:   91  M PTA:   21   M PRICE:   88  M DPA:   103  M Waveforms: T=Triphasic, M=Monophasic, B=Biphasic Left Leg:(cm/s) Location: Velocities Waveforms EIA:   144  T CFA:   118  T PFA:   104  T SFA Proximal:   131  B SFA Mid:   114  T SFA Distal:   129  T Popliteal Artery:   73  B PTA:   70   M PRICE:   31  M DPA:   80  M Waveforms: T=Triphasic, M=Monophasic, B=Biphasic Impression: Right Lower Extremity: Triphasic flow in femoral artery suggesting no inflow disease.  Transition to monophasic flow in the popliteal artery without hemodynamic significance. Left Lower Extremity: Triphasic flow in common femoral artery suggesting no inflow disease.  Transition to monophasic flow in popliteal artery without hemodynamic significance Reference: Category Normal 1-19% 20-49% 50-99% Occluded PSV <160 cm/sec  without spectral broadening <160 cm/sec with spectral broadening Increased Increased Absent Flow Ratio N/A N/A < 2.0 >2.0 N/A Post-Stenotic Turbulence No No No Yes N/A           LABS:      Sodium   Date Value Ref Range Status   01/17/2023 135 (L) 136 - 145 mmol/L Final   01/10/2023 141 136 - 145 mmol/L Final   01/03/2023 140 136 - 145 mmol/L Final   06/30/2018 139 133 - 144 mmol/L Final   06/29/2018 139 133 - 144 mmol/L Final   06/28/2018 136 133 - 144 mmol/L Final     Urea Nitrogen   Date Value Ref Range Status   01/17/2023 19.5 8.0 - 23.0 mg/dL Final   01/10/2023 20.4 8.0 - 23.0 mg/dL Final   01/03/2023 22.2 8.0 - 23.0 mg/dL Final   05/24/2022 21 8 - 28 mg/dL Final   10/21/2021 16 8 - 28 mg/dL Final   03/09/2021 28 8 - 28 mg/dL Final   06/30/2018 22 7 - 30 mg/dL Final   06/29/2018 25 7 - 30 mg/dL Final   06/28/2018 22 7 - 30 mg/dL Final     Hemoglobin   Date Value Ref Range Status   01/17/2023 11.4 (L) 13.3 - 17.7 g/dL Final   05/24/2022 11.5 (L) 13.3 - 17.7 g/dL Final   10/21/2021 14.3 13.3 - 17.7 g/dL Final   06/30/2018 10.5 (L) 13.3 - 17.7 g/dL Final   06/29/2018 10.5 (L) 13.3 - 17.7 g/dL Final   06/28/2018 10.5 (L) 13.3 - 17.7 g/dL Final     Platelet Count   Date Value Ref Range Status   01/17/2023 164 150 - 450 10e3/uL Final   05/24/2022 234 150 - 450 10e3/uL Final   10/21/2021 182 150 - 450 10e3/uL Final   06/30/2018 105 (L) 150 - 450 10e9/L Final   06/29/2018 95 (L) 150 - 450 10e9/L Final   06/28/2018 89 (L) 150 - 450 10e9/L Final     BNP   Date Value Ref Range Status   08/17/2019 181 (H) 0 - 74 pg/mL Final   11/07/2018 161 (H) 0 - 72 pg/mL Final   11/05/2018 148 (H) 0 - 72 pg/mL Final     INR   Date Value Ref Range Status   08/18/2019 1.37 (H) 0.90 - 1.10 Final   08/18/2019 1.28 (H) 0.90 - 1.10 Final   08/17/2019 1.26 (H) 0.90 - 1.10 Final   06/30/2018 1.55 (H) 0.86 - 1.14 Final   06/29/2018 1.55 (H) 0.86 - 1.14 Final   06/28/2018 1.72 (H) 0.86 - 1.14 Final       30 minutes spent on the day of encounter  doing chart review, history and exam, documentation, and further activities as noted.         Naveen Hamm MD, [facility]  VASCULAR SURGERY

## 2023-01-19 NOTE — TELEPHONE ENCOUNTER
Surgery Scheduled      Surgery/Procedure:   RLE ANGIO 1/25/23 12PM and LLE ANGIO 2/8/23 8AM    Special Equipment: NA    Location: [facility]:  [address] [place] [address] (phone: [phone], Fax: [phone])        Admission Type: Outpatient    Surgeon: Dr. Hamm    OR Confirmed & :  Yes with [facility] on 1/19/2023    Entered on provider calendar:  Yes    Covid Scheduled: NA    Post Op: 2/15 and 2/16    Wound Vac Needed: No    Home Care Needed: No    Ultrasound Contacted: Confirmed with NA on 1/19/2023    Reps Contacted: NA    Blood Thinners Addressed:  Yes - hold xarelto for 3 days, no bridging.

## 2023-01-20 ENCOUNTER — TRANSITIONAL CARE UNIT VISIT (OUTPATIENT)
Dept: GERIATRICS | Facility: CLINIC | Age: 78
End: 2023-01-20
Payer: MEDICARE

## 2023-01-20 VITALS
HEIGHT: 70 IN | BODY MASS INDEX: 28.85 KG/M2 | WEIGHT: 201.5 LBS | SYSTOLIC BLOOD PRESSURE: 135 MMHG | DIASTOLIC BLOOD PRESSURE: 64 MMHG | OXYGEN SATURATION: 95 % | RESPIRATION RATE: 18 BRPM | TEMPERATURE: 97.9 F | HEART RATE: 73 BPM

## 2023-01-20 DIAGNOSIS — N18.32 STAGE 3B CHRONIC KIDNEY DISEASE (H): ICD-10-CM

## 2023-01-20 DIAGNOSIS — E08.621 DIABETIC ULCER OF TOE OF RIGHT FOOT ASSOCIATED WITH DIABETES MELLITUS DUE TO UNDERLYING CONDITION, LIMITED TO BREAKDOWN OF SKIN (H): ICD-10-CM

## 2023-01-20 DIAGNOSIS — E11.59 TYPE 2 DIABETES MELLITUS WITH OTHER CIRCULATORY COMPLICATION, WITH LONG-TERM CURRENT USE OF INSULIN (H): ICD-10-CM

## 2023-01-20 DIAGNOSIS — I10 ESSENTIAL HYPERTENSION: ICD-10-CM

## 2023-01-20 DIAGNOSIS — Z79.4 TYPE 2 DIABETES MELLITUS WITH OTHER CIRCULATORY COMPLICATION, WITH LONG-TERM CURRENT USE OF INSULIN (H): ICD-10-CM

## 2023-01-20 DIAGNOSIS — E08.621 DIABETIC ULCER OF TOE OF RIGHT FOOT ASSOCIATED WITH DIABETES MELLITUS DUE TO UNDERLYING CONDITION, WITH NECROSIS OF BONE (H): ICD-10-CM

## 2023-01-20 DIAGNOSIS — L97.511 DIABETIC ULCER OF TOE OF RIGHT FOOT ASSOCIATED WITH DIABETES MELLITUS DUE TO UNDERLYING CONDITION, LIMITED TO BREAKDOWN OF SKIN (H): ICD-10-CM

## 2023-01-20 DIAGNOSIS — L97.514 DIABETIC ULCER OF TOE OF RIGHT FOOT ASSOCIATED WITH DIABETES MELLITUS DUE TO UNDERLYING CONDITION, WITH NECROSIS OF BONE (H): ICD-10-CM

## 2023-01-20 DIAGNOSIS — I50.30 HEART FAILURE WITH PRESERVED EJECTION FRACTION, NYHA CLASS II (H): ICD-10-CM

## 2023-01-20 DIAGNOSIS — M86.9 OSTEOMYELITIS OF RIGHT FOOT, UNSPECIFIED TYPE (H): Primary | ICD-10-CM

## 2023-01-20 PROCEDURE — 99309 SBSQ NF CARE MODERATE MDM 30: CPT | Performed by: NURSE PRACTITIONER

## 2023-01-20 RX ORDER — MINERAL OIL/HYDROPHIL PETROLAT
OINTMENT (GRAM) TOPICAL 2 TIMES DAILY
COMMUNITY
End: 2023-05-18

## 2023-01-20 RX ORDER — TAMSULOSIN HYDROCHLORIDE 0.4 MG/1
0.4 CAPSULE ORAL DAILY
COMMUNITY

## 2023-01-20 RX ORDER — FUROSEMIDE 40 MG
40 TABLET ORAL DAILY
COMMUNITY

## 2023-01-20 RX ORDER — LOSARTAN POTASSIUM 25 MG/1
12.5 TABLET ORAL DAILY
COMMUNITY

## 2023-01-20 RX ORDER — GLIPIZIDE 10 MG/1
2.5 TABLET ORAL
COMMUNITY
End: 2023-07-07

## 2023-01-20 NOTE — LETTER
1/20/2023        RE: Ever Crane  [address]  [address]  [place] [address]        [facility] GERIATRICS  TCU FOLLOW UP VISIT    Chief Complaint   Patient presents with     RECHECK      Place of Service where encounter took place:  [facility] (TCU) [[id number]]    Ever Crane  is a 77 year old  (1945), who is being seen today at the above facility to discuss progress in therapy, review nursing home EHR, recheck chronic medical problems as well as address any new concerns.       HPI:    Copied forward from previous note: admitted to the above facility from [facility]. Emergency department stay 1/17/2023.  Patient's living condition: lives in an assisted living facility  Brief Summary of Hospital Course: patient was to be directly admitted to the tcu for wound care and therapy from his assisted living but due to concern over increase in redness to his wounds he was seen in the ER yesterday. He follows with Dr. brunner and patient is discussing amputation.   MEDICATION CHANGES: none  RECOMMENDED FOLLOW UP: vascular surgeon.   Updates on Status Since Skilled nursing Admission:   1/18 Check blood sugars 4 times daily diagnosis DM2  Change urea cream to apply to both legs daily  Add bactrim DS 1 tab po BID through 1/24.   1/19 saw vascular  1/19 MD visit    Today: Patient reports no concerns.  He denies constipation diarrhea.  Complains of pain in both heels.  States he is working with therapy.  External notes reviewed: Facility EHR including BM record, progress notes, vital signs.   Tests/results reviewed: None  HPI/ROS reviewed with Independent historian: None  HPI/ROS reviewed with other qualified health care personal: Nurse reports no concerns    ALLERGIES: Doxycycline, Latex, Penicillin v, and Penicillins    Medications reviewed and reconciled. Several medications discontinued due to no use at the AL or no current reason.   Current Outpatient Medications    Medication Sig Dispense Refill     acetaminophen (TYLENOL) 325 MG tablet Take 650 mg by mouth every 6 hours as needed       albuterol (PROAIR HFA/PROVENTIL HFA/VENTOLIN HFA) 108 (90 Base) MCG/ACT inhaler Inhale 2 puffs into the lungs 2 times daily And q4h PRN       celecoxib (CELEBREX) 200 MG capsule Take 200 mg by mouth daily       cetirizine (ZYRTEC) 10 MG tablet Take 10 mg by mouth daily       cyanocobalamin (VITAMIN B-12) 2500 MCG SUBL sublingual tablet Place 2,500 mcg under the tongue daily       furosemide (LASIX) 40 MG tablet Take 40 mg by mouth daily       glipiZIDE (GLUCOTROL) 10 MG tablet Take 10 mg by mouth 2 times daily (before meals)       ketoconazole (NIZORAL) 2 % external shampoo Apply topically twice a week Mon and Fri.       liraglutide (VICTOZA) 18 MG/3ML solution Inject 0.6 mg Subcutaneous daily       losartan (COZAAR) 25 MG tablet Take 25 mg by mouth daily       mineral oil-hydrophilic petrolatum (AQUAPHOR) external ointment Apply topically 2 times daily       mineral oil-hydrophilic petrolatum (AQUAPHOR) external ointment Apply topically 2 times daily       nitroGLYcerin (NITROSTAT) 0.4 MG sublingual tablet Place 0.4 mg under the tongue every 5 minutes as needed       omeprazole (PRILOSEC) 20 MG CR capsule Take 20 mg by mouth daily       polyethylene glycol (MIRALAX) 17 g packet Take 1 packet by mouth daily       rivaroxaban ANTICOAGULANT (XARELTO) 15 MG TABS tablet Take by mouth daily (with dinner)       senna-docusate (SENOKOT-S/PERICOLACE) 8.6-50 MG tablet Take 1 tablet by mouth 2 times daily And BID PRN       simvastatin (ZOCOR) 40 MG tablet Take 40 mg by mouth At Bedtime       spironolactone (ALDACTONE) 25 MG tablet Take 25 mg by mouth daily       sulfamethoxazole-trimethoprim (BACTRIM DS) 800-160 MG tablet Take 1 tablet by mouth 2 times daily 20 tablet 0     tamsulosin (FLOMAX) 0.4 MG capsule Take 0.4 mg by mouth daily         ROS:  4 point ROS including Respiratory, CV, GI and ,  "other than that noted in the HPI,  is negative    Vitals:  /64   Pulse 73   Temp 97.9  F (36.6  C)   Resp 18   Ht 1.778 m (5' 10\")   Wt 91.4 kg (201 lb 8 oz)   SpO2 95%   BMI 28.91 kg/m    Exam:  GENERAL APPEARANCE:  Alert, in no distress  RESP:  respiratory effort normal  CV:   edema none  M/S:  Gait and station -observed in bed   SKIN:  Inspection and palpation of skin and subcutaneous tissue drake heel dressing with drainage.  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  Patient has been admitted to the TCU for therapy and nursing care. He is followed by Dr. Spears of podiatry.  Patient is discussing amputation due to osteomyelitis.  -Continue wound cares as ordered  -Follow-up with podiatry and vascular surgery as directed  -Complete course of Bactrim     Type 2 diabetes mellitus with other circulatory complication, with long-term current use of insulin (H)  Current regimen unclear.  The patient is on Victoza 1.2 mg subcutaneously 1 time a day.  He has orders for glipizide 10 mg 2 times a day.  There is mention of him being on glargine and the dose was reduced to 10 units recently in the note from his primary NP.      Essential hypertension  Currently on losartan     Heart failure with preserved ejection fraction, NYHA class II (H)  No current concerns.  Continues on losartan and spironolactone      Stage 3b chronic kidney disease (H)  Monitor as needed.  Keep kidney function in mind with medication changes and illness      Orders:  -Discontinue TobraDex, bisacodyl, and Pratropium, " diclofenac, Mucinex, albuterol neb, serial labs, Vanicream and urea cream.  - Pillow boots when in bed    Electronically signed by: Kellen Eid NP          Sincerely,        Kellen Eid NP

## 2023-01-20 NOTE — PROGRESS NOTES
[facility] GERIATRICS  TCU FOLLOW UP VISIT    Chief Complaint   Patient presents with     RECHECK      Place of Service where encounter took place:  [facility] (TCU) [4002]    Ever Crane  is a 77 year old  (1945), who is being seen today at the above facility to discuss progress in therapy, review nursing home EHR, recheck chronic medical problems as well as address any new concerns.       HPI:    Copied forward from previous note: admitted to the above facility from [facility]. Emergency department stay 1/17/2023.  Patient's living condition: lives in an assisted living facility  Brief Summary of Hospital Course: patient was to be directly admitted to the tcu for wound care and therapy from his assisted living but due to concern over increase in redness to his wounds he was seen in the ER yesterday. He follows with Dr. brunner and patient is discussing amputation.   MEDICATION CHANGES: none  RECOMMENDED FOLLOW UP: vascular surgeon.   Updates on Status Since Skilled nursing Admission:   1/18 Check blood sugars 4 times daily diagnosis DM2  Change urea cream to apply to both legs daily  Add bactrim DS 1 tab po BID through 1/24.   1/19 saw vascular  1/19 MD visit    Today: Patient reports no concerns.  He denies constipation diarrhea.  Complains of pain in both heels.  States he is working with therapy.  External notes reviewed: Facility EHR including BM record, progress notes, vital signs.   Tests/results reviewed: None  HPI/ROS reviewed with Independent historian: None  HPI/ROS reviewed with other qualified health care personal: Nurse reports no concerns    ALLERGIES: Doxycycline, Latex, Penicillin v, and Penicillins    Medications reviewed and reconciled. Several medications discontinued due to no use at the AL or no current reason.   Current Outpatient Medications   Medication Sig Dispense Refill     acetaminophen (TYLENOL) 325 MG tablet Take 650 mg by mouth every 6  hours as needed       albuterol (PROAIR HFA/PROVENTIL HFA/VENTOLIN HFA) 108 (90 Base) MCG/ACT inhaler Inhale 2 puffs into the lungs 2 times daily And q4h PRN       celecoxib (CELEBREX) 200 MG capsule Take 200 mg by mouth daily       cetirizine (ZYRTEC) 10 MG tablet Take 10 mg by mouth daily       cyanocobalamin (VITAMIN B-12) 2500 MCG SUBL sublingual tablet Place 2,500 mcg under the tongue daily       furosemide (LASIX) 40 MG tablet Take 40 mg by mouth daily       glipiZIDE (GLUCOTROL) 10 MG tablet Take 10 mg by mouth 2 times daily (before meals)       ketoconazole (NIZORAL) 2 % external shampoo Apply topically twice a week Mon and Fri.       liraglutide (VICTOZA) 18 MG/3ML solution Inject 0.6 mg Subcutaneous daily       losartan (COZAAR) 25 MG tablet Take 25 mg by mouth daily       mineral oil-hydrophilic petrolatum (AQUAPHOR) external ointment Apply topically 2 times daily       mineral oil-hydrophilic petrolatum (AQUAPHOR) external ointment Apply topically 2 times daily       nitroGLYcerin (NITROSTAT) 0.4 MG sublingual tablet Place 0.4 mg under the tongue every 5 minutes as needed       omeprazole (PRILOSEC) 20 MG CR capsule Take 20 mg by mouth daily       polyethylene glycol (MIRALAX) 17 g packet Take 1 packet by mouth daily       rivaroxaban ANTICOAGULANT (XARELTO) 15 MG TABS tablet Take by mouth daily (with dinner)       senna-docusate (SENOKOT-S/PERICOLACE) 8.6-50 MG tablet Take 1 tablet by mouth 2 times daily And BID PRN       simvastatin (ZOCOR) 40 MG tablet Take 40 mg by mouth At Bedtime       spironolactone (ALDACTONE) 25 MG tablet Take 25 mg by mouth daily       sulfamethoxazole-trimethoprim (BACTRIM DS) 800-160 MG tablet Take 1 tablet by mouth 2 times daily 20 tablet 0     tamsulosin (FLOMAX) 0.4 MG capsule Take 0.4 mg by mouth daily         ROS:  4 point ROS including Respiratory, CV, GI and , other than that noted in the HPI,  is negative    Vitals:  /64   Pulse 73   Temp 97.9  F (36.6  C)   " Resp 18   Ht 1.778 m (5' 10\")   Wt 91.4 kg (201 lb 8 oz)   SpO2 95%   BMI 28.91 kg/m    Exam:  GENERAL APPEARANCE:  Alert, in no distress  RESP:  respiratory effort normal  CV:   edema none  M/S:  Gait and station -observed in bed   SKIN:  Inspection and palpation of skin and subcutaneous tissue drake heel dressing with drainage.  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  Patient has been admitted to the TCU for therapy and nursing care. He is followed by Dr. Spears of podiatry.  Patient is discussing amputation due to osteomyelitis.  -Continue wound cares as ordered  -Follow-up with podiatry and vascular surgery as directed  -Complete course of Bactrim     Type 2 diabetes mellitus with other circulatory complication, with long-term current use of insulin (H)  Current regimen unclear.  The patient is on Victoza 1.2 mg subcutaneously 1 time a day.  He has orders for glipizide 10 mg 2 times a day.  There is mention of him being on glargine and the dose was reduced to 10 units recently in the note from his primary NP.      Essential hypertension  Currently on losartan     Heart failure with preserved ejection fraction, NYHA class II (H)  No current concerns.  Continues on losartan and spironolactone      Stage 3b chronic kidney disease (H)  Monitor as needed.  Keep kidney function in mind with medication changes and illness      Orders:  -Discontinue TobraDex, bisacodyl, and Pratropium, diclofenac, Mucinex, albuterol neb, serial labs, Vanicream and urea cream.  - Pillow boots when in " bed    Electronically signed by: Kellen Eid NP

## 2023-01-23 DIAGNOSIS — L89.623 PRESSURE ULCER OF LEFT HEEL, STAGE 3 (H): ICD-10-CM

## 2023-01-23 DIAGNOSIS — M86.9 OSTEOMYELITIS OF ANKLE AND FOOT (H): Primary | ICD-10-CM

## 2023-01-23 DIAGNOSIS — L89.613 PRESSURE ULCER OF RIGHT HEEL, STAGE 3 (H): ICD-10-CM

## 2023-01-23 NOTE — TELEPHONE ENCOUNTER
Patient's right leg needs to be completed this week. Will have Dr. Jackson do the angiogram tomorrow. Spoke with Raven, nurse at [facility] at the [facility], and reviewed instructions with her. HOLD XARELTO TODAY. Also notified her that Dr. Spears needs patient to have MRI of bilateral ankles due to heel pressure sores. She will call to arrange that.     Reviewed that left leg angio will be done on 2/7/23.     Spoke with Marie, sister, and updated her on angiograms.       1/24/23 Right leg angiogram  Arrival time: 0700  [facility]   Dr. Jackson    2/7/23 Left leg angiogram  Arrival time: 0700  [facility]  Dr. Hamm    Holding Xarelto 24hrs.

## 2023-01-23 NOTE — TELEPHONE ENCOUNTER
Updated Parmly on the Michelle and sister Marie that angio date moved to 2/7/23. Sister would like writer to double check with Dr. Hamm due to patients foot wounds that this is ok.

## 2023-01-24 ENCOUNTER — DOCUMENTATION ONLY (OUTPATIENT)
Dept: VASCULAR SURGERY | Facility: CLINIC | Age: 78
End: 2023-01-24

## 2023-01-24 ENCOUNTER — TRANSITIONAL CARE UNIT VISIT (OUTPATIENT)
Dept: GERIATRICS | Facility: CLINIC | Age: 78
End: 2023-01-24
Payer: MEDICARE

## 2023-01-24 ENCOUNTER — HOSPITAL ENCOUNTER (OUTPATIENT)
Dept: INTERVENTIONAL RADIOLOGY/VASCULAR | Facility: CLINIC | Age: 78
Discharge: HOME | End: 2023-01-24
Attending: SURGERY | Admitting: SURGERY
Payer: MEDICARE

## 2023-01-24 VITALS
HEART RATE: 63 BPM | BODY MASS INDEX: 28.77 KG/M2 | SYSTOLIC BLOOD PRESSURE: 136 MMHG | OXYGEN SATURATION: 96 % | RESPIRATION RATE: 16 BRPM | DIASTOLIC BLOOD PRESSURE: 63 MMHG | WEIGHT: 201 LBS | HEIGHT: 70 IN | TEMPERATURE: 98 F

## 2023-01-24 VITALS
TEMPERATURE: 98.1 F | DIASTOLIC BLOOD PRESSURE: 60 MMHG | RESPIRATION RATE: 19 BRPM | SYSTOLIC BLOOD PRESSURE: 125 MMHG | HEART RATE: 58 BPM | OXYGEN SATURATION: 96 %

## 2023-01-24 DIAGNOSIS — I50.30 HEART FAILURE WITH PRESERVED EJECTION FRACTION, NYHA CLASS II (H): ICD-10-CM

## 2023-01-24 DIAGNOSIS — Z79.4 TYPE 2 DIABETES MELLITUS WITH OTHER CIRCULATORY COMPLICATION, WITH LONG-TERM CURRENT USE OF INSULIN (H): ICD-10-CM

## 2023-01-24 DIAGNOSIS — I73.9 PAD (PERIPHERAL ARTERY DISEASE) (H): ICD-10-CM

## 2023-01-24 DIAGNOSIS — I10 ESSENTIAL HYPERTENSION: ICD-10-CM

## 2023-01-24 DIAGNOSIS — L97.514 DIABETIC ULCER OF TOE OF RIGHT FOOT ASSOCIATED WITH DIABETES MELLITUS DUE TO UNDERLYING CONDITION, WITH NECROSIS OF BONE (H): ICD-10-CM

## 2023-01-24 DIAGNOSIS — I70.25 ATHEROSCLEROSIS OF NATIVE ARTERIES OF OTHER EXTREMITIES WITH ULCERATION (H): ICD-10-CM

## 2023-01-24 DIAGNOSIS — E08.621 DIABETIC ULCER OF TOE OF RIGHT FOOT ASSOCIATED WITH DIABETES MELLITUS DUE TO UNDERLYING CONDITION, LIMITED TO BREAKDOWN OF SKIN (H): ICD-10-CM

## 2023-01-24 DIAGNOSIS — E08.621 DIABETIC ULCER OF TOE OF RIGHT FOOT ASSOCIATED WITH DIABETES MELLITUS DUE TO UNDERLYING CONDITION, WITH NECROSIS OF BONE (H): ICD-10-CM

## 2023-01-24 DIAGNOSIS — N18.32 STAGE 3B CHRONIC KIDNEY DISEASE (H): ICD-10-CM

## 2023-01-24 DIAGNOSIS — M86.9 OSTEOMYELITIS OF RIGHT FOOT, UNSPECIFIED TYPE (H): Primary | ICD-10-CM

## 2023-01-24 DIAGNOSIS — L97.511 DIABETIC ULCER OF TOE OF RIGHT FOOT ASSOCIATED WITH DIABETES MELLITUS DUE TO UNDERLYING CONDITION, LIMITED TO BREAKDOWN OF SKIN (H): ICD-10-CM

## 2023-01-24 DIAGNOSIS — E11.59 TYPE 2 DIABETES MELLITUS WITH OTHER CIRCULATORY COMPLICATION, WITH LONG-TERM CURRENT USE OF INSULIN (H): ICD-10-CM

## 2023-01-24 PROBLEM — D69.2 SENILE PURPURA (H): Status: RESOLVED | Noted: 2022-02-14 | Resolved: 2023-01-24

## 2023-01-24 PROBLEM — I48.91 HYPERCOAGULABLE STATE DUE TO ATRIAL FIBRILLATION (H): Status: RESOLVED | Noted: 2023-01-12 | Resolved: 2023-01-24

## 2023-01-24 PROBLEM — I50.22 CHRONIC SYSTOLIC HEART FAILURE (H): Status: ACTIVE | Noted: 2023-01-17

## 2023-01-24 PROBLEM — D68.69 HYPERCOAGULABLE STATE DUE TO ATRIAL FIBRILLATION (H): Status: RESOLVED | Noted: 2023-01-12 | Resolved: 2023-01-24

## 2023-01-24 LAB
ANION GAP SERPL CALCULATED.3IONS-SCNC: 11 MMOL/L (ref 5–18)
APTT PPP: 36 SECONDS (ref 22–38)
BUN SERPL-MCNC: 22 MG/DL (ref 8–28)
CALCIUM SERPL-MCNC: 9.5 MG/DL (ref 8.5–10.5)
CHLORIDE BLD-SCNC: 107 MMOL/L (ref 98–107)
CO2 SERPL-SCNC: 21 MMOL/L (ref 22–31)
CREAT SERPL-MCNC: 1.93 MG/DL (ref 0.7–1.3)
ERYTHROCYTE [DISTWIDTH] IN BLOOD BY AUTOMATED COUNT: 13.3 % (ref 10–15)
GFR SERPL CREATININE-BSD FRML MDRD: 35 ML/MIN/1.73M2
GLUCOSE BLD-MCNC: 106 MG/DL (ref 70–125)
HCT VFR BLD AUTO: 34.9 % (ref 40–53)
HGB BLD-MCNC: 10.8 G/DL (ref 13.3–17.7)
INR PPP: 1.23 (ref 0.85–1.15)
MCH RBC QN AUTO: 27.3 PG (ref 26.5–33)
MCHC RBC AUTO-ENTMCNC: 30.9 G/DL (ref 31.5–36.5)
MCV RBC AUTO: 88 FL (ref 78–100)
PLATELET # BLD AUTO: 174 10E3/UL (ref 150–450)
POTASSIUM BLD-SCNC: 5.5 MMOL/L (ref 3.5–5)
RBC # BLD AUTO: 3.96 10E6/UL (ref 4.4–5.9)
RETICS # AUTO: 0.07 10E6/UL (ref 0.01–0.11)
RETICS/RBC NFR AUTO: 1.8 % (ref 0.8–2.7)
SODIUM SERPL-SCNC: 139 MMOL/L (ref 136–145)
WBC # BLD AUTO: 8.7 10E3/UL (ref 4–11)

## 2023-01-24 PROCEDURE — 272N000500 HC NEEDLE CR2

## 2023-01-24 PROCEDURE — 99309 SBSQ NF CARE MODERATE MDM 30: CPT | Performed by: NURSE PRACTITIONER

## 2023-01-24 PROCEDURE — 250N000011 HC RX IP 250 OP 636: Performed by: STUDENT IN AN ORGANIZED HEALTH CARE EDUCATION/TRAINING PROGRAM

## 2023-01-24 PROCEDURE — 36247 INS CATH ABD/L-EXT ART 3RD: CPT | Performed by: SURGERY

## 2023-01-24 PROCEDURE — 80048 BASIC METABOLIC PNL TOTAL CA: CPT | Performed by: STUDENT IN AN ORGANIZED HEALTH CARE EDUCATION/TRAINING PROGRAM

## 2023-01-24 PROCEDURE — 85045 AUTOMATED RETICULOCYTE COUNT: CPT | Performed by: STUDENT IN AN ORGANIZED HEALTH CARE EDUCATION/TRAINING PROGRAM

## 2023-01-24 PROCEDURE — 85027 COMPLETE CBC AUTOMATED: CPT | Performed by: STUDENT IN AN ORGANIZED HEALTH CARE EDUCATION/TRAINING PROGRAM

## 2023-01-24 PROCEDURE — 99152 MOD SED SAME PHYS/QHP 5/>YRS: CPT

## 2023-01-24 PROCEDURE — 76937 US GUIDE VASCULAR ACCESS: CPT | Mod: 26 | Performed by: SURGERY

## 2023-01-24 PROCEDURE — 36415 COLL VENOUS BLD VENIPUNCTURE: CPT | Performed by: SURGERY

## 2023-01-24 PROCEDURE — C1769 GUIDE WIRE: HCPCS

## 2023-01-24 PROCEDURE — 85610 PROTHROMBIN TIME: CPT | Performed by: SURGERY

## 2023-01-24 PROCEDURE — 250N000011 HC RX IP 250 OP 636: Performed by: SURGERY

## 2023-01-24 PROCEDURE — 75710 ARTERY X-RAYS ARM/LEG: CPT | Mod: 26 | Performed by: SURGERY

## 2023-01-24 PROCEDURE — 75710 ARTERY X-RAYS ARM/LEG: CPT | Mod: RT

## 2023-01-24 PROCEDURE — 272N000123 HC CATH CR9

## 2023-01-24 PROCEDURE — 272N000566 HC SHEATH CR3

## 2023-01-24 PROCEDURE — 85730 THROMBOPLASTIN TIME PARTIAL: CPT | Mod: GZ | Performed by: SURGERY

## 2023-01-24 PROCEDURE — 250N000009 HC RX 250: Performed by: SURGERY

## 2023-01-24 PROCEDURE — 255N000002 HC RX 255 OP 636: Performed by: SURGERY

## 2023-01-24 PROCEDURE — 99152 MOD SED SAME PHYS/QHP 5/>YRS: CPT | Performed by: SURGERY

## 2023-01-24 PROCEDURE — 258N000003 HC RX IP 258 OP 636: Performed by: STUDENT IN AN ORGANIZED HEALTH CARE EDUCATION/TRAINING PROGRAM

## 2023-01-24 PROCEDURE — 36247 INS CATH ABD/L-EXT ART 3RD: CPT

## 2023-01-24 RX ORDER — IODIXANOL 320 MG/ML
35 INJECTION, SOLUTION INTRAVASCULAR ONCE
Status: COMPLETED | OUTPATIENT
Start: 2023-01-24 | End: 2023-01-24

## 2023-01-24 RX ORDER — OXYCODONE HYDROCHLORIDE 5 MG/1
5 TABLET ORAL EVERY 4 HOURS PRN
Status: DISCONTINUED | OUTPATIENT
Start: 2023-01-24 | End: 2023-01-25 | Stop reason: HOSPADM

## 2023-01-24 RX ORDER — NALOXONE HYDROCHLORIDE 0.4 MG/ML
0.2 INJECTION, SOLUTION INTRAMUSCULAR; INTRAVENOUS; SUBCUTANEOUS
Status: DISCONTINUED | OUTPATIENT
Start: 2023-01-24 | End: 2023-01-25 | Stop reason: HOSPADM

## 2023-01-24 RX ORDER — NALOXONE HYDROCHLORIDE 0.4 MG/ML
0.4 INJECTION, SOLUTION INTRAMUSCULAR; INTRAVENOUS; SUBCUTANEOUS
Status: DISCONTINUED | OUTPATIENT
Start: 2023-01-24 | End: 2023-01-25 | Stop reason: HOSPADM

## 2023-01-24 RX ORDER — HEPARIN SODIUM 200 [USP'U]/100ML
1 INJECTION, SOLUTION INTRAVENOUS CONTINUOUS PRN
Status: DISCONTINUED | OUTPATIENT
Start: 2023-01-24 | End: 2023-01-25 | Stop reason: HOSPADM

## 2023-01-24 RX ORDER — SODIUM CHLORIDE 9 MG/ML
INJECTION, SOLUTION INTRAVENOUS CONTINUOUS
Status: DISCONTINUED | OUTPATIENT
Start: 2023-01-24 | End: 2023-01-25 | Stop reason: HOSPADM

## 2023-01-24 RX ORDER — NICOTINE POLACRILEX 4 MG
15-30 LOZENGE BUCCAL
Status: DISCONTINUED | OUTPATIENT
Start: 2023-01-24 | End: 2023-01-25 | Stop reason: HOSPADM

## 2023-01-24 RX ORDER — FLUMAZENIL 0.1 MG/ML
0.2 INJECTION, SOLUTION INTRAVENOUS
Status: DISCONTINUED | OUTPATIENT
Start: 2023-01-24 | End: 2023-01-25 | Stop reason: HOSPADM

## 2023-01-24 RX ORDER — DEXTROSE MONOHYDRATE 25 G/50ML
25-50 INJECTION, SOLUTION INTRAVENOUS
Status: DISCONTINUED | OUTPATIENT
Start: 2023-01-24 | End: 2023-01-25 | Stop reason: HOSPADM

## 2023-01-24 RX ORDER — FENTANYL CITRATE 50 UG/ML
25-50 INJECTION, SOLUTION INTRAMUSCULAR; INTRAVENOUS EVERY 5 MIN PRN
Status: DISCONTINUED | OUTPATIENT
Start: 2023-01-24 | End: 2023-01-25 | Stop reason: HOSPADM

## 2023-01-24 RX ORDER — LIDOCAINE 40 MG/G
CREAM TOPICAL
Status: DISCONTINUED | OUTPATIENT
Start: 2023-01-24 | End: 2023-01-25 | Stop reason: HOSPADM

## 2023-01-24 RX ORDER — ACETAMINOPHEN 325 MG/1
650 TABLET ORAL EVERY 6 HOURS PRN
Status: DISCONTINUED | OUTPATIENT
Start: 2023-01-24 | End: 2023-01-25 | Stop reason: HOSPADM

## 2023-01-24 RX ADMIN — HEPARIN SODIUM 1 BAG: 200 INJECTION, SOLUTION INTRAVENOUS at 08:38

## 2023-01-24 RX ADMIN — IODIXANOL 35 ML: 320 INJECTION, SOLUTION INTRAVASCULAR at 09:09

## 2023-01-24 RX ADMIN — LIDOCAINE HYDROCHLORIDE 30 ML: 10 INJECTION, SOLUTION EPIDURAL; INFILTRATION; INTRACAUDAL; PERINEURAL at 08:38

## 2023-01-24 RX ADMIN — MIDAZOLAM HYDROCHLORIDE 0.5 MG: 1 INJECTION, SOLUTION INTRAMUSCULAR; INTRAVENOUS at 08:43

## 2023-01-24 RX ADMIN — MIDAZOLAM HYDROCHLORIDE 1 MG: 1 INJECTION, SOLUTION INTRAMUSCULAR; INTRAVENOUS at 08:38

## 2023-01-24 RX ADMIN — FENTANYL CITRATE 25 MCG: 50 INJECTION, SOLUTION INTRAMUSCULAR; INTRAVENOUS at 08:44

## 2023-01-24 RX ADMIN — FENTANYL CITRATE 50 MCG: 50 INJECTION, SOLUTION INTRAMUSCULAR; INTRAVENOUS at 08:37

## 2023-01-24 RX ADMIN — SODIUM CHLORIDE: 9 INJECTION, SOLUTION INTRAVENOUS at 07:35

## 2023-01-24 NOTE — PROGRESS NOTES
[facility] GERIATRICS  TCU FOLLOW UP VISIT    Chief Complaint   Patient presents with     RECHECK      Place of Service where encounter took place:  [facility] (TCU) [4002]    Ever Crane  is a 77 year old  (1945), who is being seen today at the above facility to discuss progress in therapy, review nursing home EHR, recheck chronic medical problems as well as address any new concerns.       HPI:    Copied forward from previous note: admitted to the above facility from [facility]. Emergency department stay 1/17/2023.  Patient's living condition: lives in an assisted living facility  Brief Summary of Hospital Course: patient was to be directly admitted to the tcu for wound care and therapy from his assisted living but due to concern over increase in redness to his wounds he was seen in the ER yesterday. He follows with Dr. brunner and patient is discussing amputation.   MEDICATION CHANGES: none  RECOMMENDED FOLLOW UP: vascular surgeon.   Updates on Status Since Skilled nursing Admission:   1/18 Check blood sugars 4 times daily diagnosis DM2  Change urea cream to apply to both legs daily  Add bactrim DS 1 tab po BID through 1/24.   1/19 saw vascular  1/19 MD visit  1/20 -Discontinue TobraDex, bisacodyl, and Pratropium, diclofenac, Mucinex, albuterol neb, serial labs, Vanicream and urea cream.  - Pillow boots when in bed  1/24 therapy reports moca 14/30 SLUMS 23/30     Today: Patient reports no concerns.  He denies constipation or diarrhea.  Has an angiogram scheduled for today.   External notes reviewed: Facility EHR including BM record, progress notes, vital signs. Shows b/ps 128 - 148/57 - 80/ HR 63 - 74.   Tests/results reviewed: None  HPI/ROS reviewed with Independent historian: None  HPI/ROS reviewed with other qualified health care personal: Nurse reports no concerns    ALLERGIES: Doxycycline, Latex, Penicillin v, and Penicillins    ROS:  4 point ROS including  "Respiratory, CV, GI and , other than that noted in the HPI,  is negative    Vitals:  /63   Pulse 63   Temp 98  F (36.7  C)   Resp 16   Ht 1.778 m (5' 10\")   Wt 91.2 kg (201 lb)   SpO2 96%   BMI 28.84 kg/m    Exam:  GENERAL APPEARANCE:  Alert, in no distress  RESP:  respiratory effort normal  CV:   edema none  M/S:  Gait and station -observed in bed   PSYCH:  insight and judgement, memory seems mild impaired, affect and mood normal    ASSESSMENT/PLAN:  Osteomyelitis of right foot, unspecified type (H)  Diabetic ulcer of toe of right foot associated with diabetes mellitus due to underlying condition, limited to breakdown of skin (H)  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  Patient has been admitted to the TCU for therapy and nursing care. He is followed by Dr. Spears of podiatry.  Patient is discussing amputation due to osteomyelitis. He has angiogram today and will have another of the other side later.   -Continue wound cares as ordered  -Follow-up with podiatry and vascular surgery as directed     Type 2 diabetes mellitus with other circulatory complication, with long-term current use of insulin (H)  Current regimen unclear.  The patient is on Victoza 1.2 mg subcutaneously 1 time a day.  He has orders for glipizide 10 mg 2 times a day.  while he was on glargine at home. His blood sugars are well controlled here.  - continue current regimen.      Essential hypertension  Controlled. Currently on losartan     Heart failure with preserved ejection fraction, NYHA class II (H)  No current concerns.  Continues on losartan and spironolactone      Stage 3b chronic kidney disease (H)  Monitor as needed.  Keep kidney function in mind with medication changes and illness    Orders:  No changes    Electronically signed by: Kellen Eid NP    "

## 2023-01-24 NOTE — DISCHARGE INSTRUCTIONS
Angiogram Discharge Instructions:  You had an angiogram procedure with Dr. Lois Jackson.  An angiogram is a procedure that uses x-rays to take pictures of your blood vessels. A long, flexible tube or catheter is inserted through the blood stream (through the procedure site - Right Groin) to help deliver contrast (dye) into the arteries so they can be visible on the x-ray. Angiograms are used to evaluate possible blockages in the arterial system. Please follow the below instructions after your angiogram, including monitoring of your procedure site.    Care instructions after angiogram procedure:  -  If you received sedation for your procedure, do not drive or operate heavy machinery for the rest of the day.  -  Do not lift objects greater than 10 pounds for 2 days following angiogram procedure.  -  Avoid excessive exercise and straining for 2 days.   -  Avoid tub baths, pools, hot tubs and Jacuzzis for 3 days or until procedure site is well healed.   -  You may shower beginning tomorrow. Do not scrub procedure site until well healed; pat dry.  -  Return to your normal activities as you tolerate after the 2 day restriction.  -  You can expect to return to work 1-2 days after your procedure - depending on the nature of your profession.  -  It is normal to have some tenderness and minimal swelling at procedure puncture site. A small area of discoloration may be present. Tenderness typically subsides in 1-2 days. A small knot may also be present at puncture site for 6-8 weeks. This can be a normal part of the healing process.     Follow up:  - Follow up with your vascular surgeon or the ordering provider. [facility] may contact you to help arrange for additional follow up.    Please seek medical evaluation for:  - If you develop fevers (greater than 101 F (38.3C)  - If you develop increasing pain, redness, purulent drainage, tenderness, or swelling at procedure site.   - If you experience any bleeding from  procedure/puncture site: lie down, firmly apply pressure to puncture site and call 911.  - Seek emergent evaluation if you experience any new leg/arm pain, discoloration or numbness.    When to call - Contact Vascular Physician     Comments: You may experience symptoms that require follow-up before your scheduled appointment. Contact your Vascular Physician if you notice the following: a lump or bleeding at the insertion site, you feel pain at the insertion site, you become lightheaded or dizzy, or feel weak or faint, you have leg pain or numbness, you have not urinated in 8 hours, or if you experience signs of infection:  fever greater than 101 degrees Fahrenheit, increasing pain, tenderness, redness or swelling at the puncture site, OR in the arm/leg near the puncture site, yellow/green/foul smelling drainage from the puncture site, any change in color, temperature, or sensation in the arm/leg where the puncture was made.       Call [facility] at [phone] with questions/concerns or if you have any of the above symptoms.    Sedation Discharge Instructions    1. You are required to have someone accompany you home.    2. Rest today. Do not drive or operate machinery today. Over activity may produce nausea and dizziness.    3. You may follow your normal diet. Drink plenty of fluids, juice, water, etc. Do not drink any alcoholic beverages for 24 hours.    4. If you have problems or questions, please call you doctor.        * Recovery After Conscious Sedation (Adult)  We gave you medicine by vein to make you sleepy or relaxed during your procedure. This may have included both a pain medicine and sleeping medicine. Most of the effects have worn off. But you may still feel sleepy for the next 6 to 8 hours.    Home care  Follow these guidelines when you get home:  You may feel sleepy and clumsy and have poor balance for the next few hours.  A responsible adult should stay with you for the next 8 hours. This  person should make sure your condition doesn t get worse.  Don't drink any alcohol for the next 24 hours.  Don't drive, operate dangerous machinery, make important business or personal decisions or sign legal documents during the next 24 hours.  You may vomit (throw up) if you eat too soon after the procedure. If this happens, drink small amounts of water, juice or clear broth. Wait to try solid food until you no longer have nausea (upset stomach).  Note: Your care team may tell you not to take any medicine by mouth for pain or sleep in the next 4 hours. These medicines may react with the medicines you had in the hospital. This could cause a much stronger response than usual.    Follow-up care  Follow up with your care team if you are not alert and back to your usual level of activity within 12 hours.    When to seek medical advice  Call your care team right away if any of these occur:  You still feel sleepy or clumsy after 12 hours, or your sleepiness gets worse  Weakness or dizziness gets worse  Repeated vomiting  If you can't be woken up and someone is staying with you, they should call 911.    For informational purposes only. Not to replace the advice of your health care provider.  Copyright   2018 [employer]. All rights reserved.

## 2023-01-24 NOTE — LETTER
1/24/2023        RE: Ever Crane  [address]  [address]  [place] [address]        [facility] GERIATRICS  TCU FOLLOW UP VISIT    Chief Complaint   Patient presents with     RECHECK      Place of Service where encounter took place:  [facility] (TCU) [4002]    Ever Crane  is a 77 year old  (1945), who is being seen today at the above facility to discuss progress in therapy, review nursing home EHR, recheck chronic medical problems as well as address any new concerns.       HPI:    Copied forward from previous note: admitted to the above facility from [facility]. Emergency department stay 1/17/2023.  Patient's living condition: lives in an assisted living facility  Brief Summary of Hospital Course: patient was to be directly admitted to the tcu for wound care and therapy from his assisted living but due to concern over increase in redness to his wounds he was seen in the ER yesterday. He follows with Dr. brunner and patient is discussing amputation.   MEDICATION CHANGES: none  RECOMMENDED FOLLOW UP: vascular surgeon.   Updates on Status Since Skilled nursing Admission:   1/18 Check blood sugars 4 times daily diagnosis DM2  Change urea cream to apply to both legs daily  Add bactrim DS 1 tab po BID through 1/24.   1/19 saw vascular  1/19 MD visit  1/20 -Discontinue TobraDex, bisacodyl, and Pratropium, diclofenac, Mucinex, albuterol neb, serial labs, Vanicream and urea cream.  - Pillow boots when in bed  1/24 therapy reports moca 14/30 SLUMS 23/30     Today: Patient reports no concerns.  He denies constipation or diarrhea.  Has an angiogram scheduled for today.   External notes reviewed: Facility EHR including BM record, progress notes, vital signs. Shows b/ps 128 - 148/57 - 80/ HR 63 - 74.   Tests/results reviewed: None  HPI/ROS reviewed with Independent historian: None  HPI/ROS reviewed with other qualified health care personal: Nurse reports no  "concerns    ALLERGIES: Doxycycline, Latex, Penicillin v, and Penicillins    ROS:  4 point ROS including Respiratory, CV, GI and , other than that noted in the HPI,  is negative    Vitals:  /63   Pulse 63   Temp 98  F (36.7  C)   Resp 16   Ht 1.778 m (5' 10\")   Wt 91.2 kg (201 lb)   SpO2 96%   BMI 28.84 kg/m    Exam:  GENERAL APPEARANCE:  Alert, in no distress  RESP:  respiratory effort normal  CV:   edema none  M/S:  Gait and station -observed in bed   PSYCH:  insight and judgement, memory seems mild impaired, affect and mood normal    ASSESSMENT/PLAN:  Osteomyelitis of right foot, unspecified type (H)  Diabetic ulcer of toe of right foot associated with diabetes mellitus due to underlying condition, limited to breakdown of skin (H)  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  Patient has been admitted to the TCU for therapy and nursing care. He is followed by Dr. Spears of podiatry.  Patient is discussing amputation due to osteomyelitis. He has angiogram today and will have another of the other side later.   -Continue wound cares as ordered  -Follow-up with podiatry and vascular surgery as directed     Type 2 diabetes mellitus with other circulatory complication, with long-term current use of insulin (H)  Current regimen unclear.  The patient is on Victoza 1.2 mg subcutaneously 1 time a day.  He has orders for glipizide 10 mg 2 times a day.  while he was on glargine at home. His blood sugars are well controlled here.  - continue current regimen.      Essential hypertension  Controlled. Currently on losartan     Heart failure with preserved ejection fraction, NYHA class II (H)  No current concerns.  Continues on losartan and spironolactone      Stage 3b chronic kidney disease (H)  Monitor as needed.  Keep kidney function in mind with medication changes and " illness    Orders:  No changes    Electronically signed by: Kellen Eid NP          Sincerely,        Kellen Eid NP

## 2023-01-24 NOTE — PRE-PROCEDURE
GENERAL PRE-PROCEDURE:   Procedure:  Right leg angiogram, possible intervention.  Monitoring and sedation for procedure.  Date/Time:  1/24/2023 7:58 AM    Verbal consent obtained?: Yes    Written consent obtained?: Yes    Risks and benefits: Risks, benefits and alternatives were discussed    Consent given by:  Patient  Patient states understanding of procedure being performed: Yes    Patient's understanding of procedure matches consent: Yes    Procedure consent matches procedure scheduled: Yes    Expected level of sedation:  Moderate  Appropriately NPO:  Yes  ASA Class:  2  Mallampati  :  Grade 1- soft palate, uvula, tonsillar pillars, and posterior pharyngeal wall visible  Lungs:  Lungs clear with good breath sounds bilaterally  Heart:  Normal heart sounds and rate and systolic murmur  History & Physical reviewed:  History and physical reviewed and no updates needed  Statement of review:  I have reviewed the lab findings, diagnostic data, medications, and the plan for sedation

## 2023-01-24 NOTE — IP AVS SNAPSHOT
[facility] Interventional Radiology  [address] [address]  Phone: [phone]  Fax: [phone]                                    After Visit Summary   1/24/2023    Ever Crane   MRN: [id number]           After Visit Summary Signature Page    I have received my discharge instructions, and my questions have been answered. I have discussed any challenges I see with this plan with the nurse or doctor.    ..........................................................................................................................................  Patient/Patient Representative Signature      ..........................................................................................................................................  Patient Representative Print Name and Relationship to Patient    ..................................................               ................................................  Date                                   Time    ..........................................................................................................................................  Reviewed by Signature/Title    ...................................................              ..............................................  Date                                               Time          22EPIC Rev 08/18

## 2023-01-24 NOTE — PROGRESS NOTES
Patient came from [facility]. Patient came without medication reconciliation sheet from TCU. Writer spoke with Alex on the [facility] MARION Rubalcava.  Gini refused to go over current medication list with writer. Gini was informed patient was having femoral artery groin access today and the importance of knowing medications. Gini still refused to go over entire list. Writer asked Gini about last dose of Xarelto, she stated last dose was 1/19/2023.

## 2023-01-24 NOTE — OP NOTE
Vascular surgery interventional procedure note    Date: 01/24/23     Procedures Performed:    Ir Lower Extremity Angiogram Right  from the right superficial femoral artery via right groin approach    Ultrasound-guided vascular access    Selective right leg angiogram with distalmost catheter position and third order superficial femoral artery  .  Provider:  Lois Jackson MD     Indication:  This is a 77-year-old gentleman with diabetes and nonhealing wounds including osteomyelitis involving his right foot.  Diminished toe pressures and duplex suggesting infrapopliteal disease with monophasic waveforms at that level.  Plan for angiogram and possible intervention to improve flow and allow for optimization of limb salvage.    Sedation:    The procedure was performed with administration of intravenous conscious sedation with appropriate preoperative, intraoperative, and postoperative evaluation.  14 minutes of supervised face to face intraservice time was provided by a radiology nurse under my direct supervision.  Versed 1.5 mg fentanyl 75 mcg given.    ANTIBIOTICS: None    FLUOROSCOPIC TIME:   Minutes    RADIATION DOSE: 17 mGy ; 1.4 Minutes ; 0 mGy     CONTRAST:  20      Procedure:    Ultrasound was used to evaluate the right groin.  The selected vessel was  widely patent.  It was the common femoral artery. Ultrasound was then used for real-time ultrasound guided needle entry of the common femoral artery. Permanent images were recorded and saved to the patient's medical record.    A 19-gauge needle had been used for access and using ultrasound the J-wire was advanced selectively past the femoral bifurcation and down the superficial femoral artery.  This was confirmed with x-ray.  At this point we exchanged in a 4 Colombian sheath over the J-wire.  Angiography was then performed from the 4 Colombian sheath down the leg    Findings: Patent superficial femoral artery without disease.  Patent popliteal artery without disease.   Widely patent origin of the anterotibial artery and largely healthy appearing anterotibial artery with no areas of more than 20% stenosis all the way down to the ankle.  At this point the artery occludes and becomes multiple collateral branches with no clearly visible dorsalis pedis artery reconstituting.  Significant good flow through these collaterals down onto the distal foot and also resulting in reconstitution of the very distal plantar arch which is a wispy vessel.  The tibioperoneal trunk is patent as well and leads to a single peroneal artery which gradually puckett out but also provides collateral flow toward the heel area.  There is no visible posterior tibial artery with it being occluded from its origin all the way down.  It does not occluded reconstitute.    Given these findings I felt there was no target for and intervention or even for bypass.    4 Palauan sheath was removed and local pressure applied for hemostasis immediately.        Impression:  Diagnostic angiogram of right leg demonstrating single-vessel inline flow to the ankle through the anterotibial artery and then fairly good collaterals on the foot with what I think is adequate flow to allow for healing of at least transmetatarsal amputation if not even his planned toe amputations.  Hopefully adequate flow for heel wound debridement healing as well.  Patient was not started on Plavix given these findings at this point.  Still plan for left leg angiogram with Dr. Hamm at a later date.      Lois Jackson MD  [facility] Vascular Surgery

## 2023-01-24 NOTE — SEDATION DOCUMENTATION
.   Patient Name: Ever Crane  Medical Record Number: [id number]  Today's Date: 1/24/2023    Procedure: Image Guided Angiogram Right Lower Extremity and sedation  Proceduralist: Dr. Lois Jackson  Pathology present: NA    Procedure Start: 0836  Procedure end: 0850  Sedation medications administered: 1.5 mg versed, 75 mcg fentanyl    Report given to: Pre/Post IR  : JENNIFER    Other Notes: Pt arrived to IR room 3  from Radiology waiting room. Consent reviewed. Pt denies any questions or concerns regarding procedure. Pt positioned supine and monitored per protocol. Diagnostic Angiogram only.  Patient to be flat per Dr. Jackson for 4 hours.  Pt tolerated procedure without any noted complications. Pt transferred back to Pre/post IR 3.     Patient transported to transport vehicle with his sister Miriam without incident. Discharge paperwork given to patient for TCU.

## 2023-01-24 NOTE — PROGRESS NOTES
I discussed with vascular surgery today that patient has been cleared for right foot TMA with debridement of the right heel after recent angio-intervention. Awaiting bilateral rearfoot MRI's to determine next steps. Also awaiting angio-intervention from Dr. Hamm on left lower extremity.

## 2023-01-26 ENCOUNTER — HOSPITAL ENCOUNTER (OUTPATIENT)
Dept: MRI IMAGING | Facility: CLINIC | Age: 78
Discharge: HOME | End: 2023-01-26
Attending: PODIATRIST
Payer: MEDICARE

## 2023-01-26 DIAGNOSIS — L89.623 PRESSURE ULCER OF LEFT HEEL, STAGE 3 (H): ICD-10-CM

## 2023-01-26 DIAGNOSIS — M86.9 OSTEOMYELITIS OF ANKLE AND FOOT (H): ICD-10-CM

## 2023-01-26 DIAGNOSIS — L89.613 PRESSURE ULCER OF RIGHT HEEL, STAGE 3 (H): ICD-10-CM

## 2023-01-26 PROCEDURE — 73721 MRI JNT OF LWR EXTRE W/O DYE: CPT | Mod: 26 | Performed by: RADIOLOGY

## 2023-01-26 PROCEDURE — G1010 CDSM STANSON: HCPCS | Performed by: RADIOLOGY

## 2023-01-26 PROCEDURE — G1010 CDSM STANSON: HCPCS | Mod: GC | Performed by: RADIOLOGY

## 2023-01-26 PROCEDURE — G1010 CDSM STANSON: HCPCS

## 2023-01-27 ENCOUNTER — TELEPHONE (OUTPATIENT)
Dept: VASCULAR SURGERY | Facility: CLINIC | Age: 78
End: 2023-01-27
Payer: MEDICARE

## 2023-01-27 NOTE — TELEPHONE ENCOUNTER
I left a voicemail with the patient requesting a return call to review his recent MRI reports. I was able to review the bilateral foot MRI's with the patient's sister which are negative for osteomyelitis of the rearfoot.     Based on the above, I recommend we proceed with right TMA with EDDY and debridement of the rearfoot ulceration. Patient has been cleared for right foot surgery by vascular surgery. Awaiting left angiogram.     I have asked the patient's sister to discuss with her brother and contact my office with their decision. Will plan on hospital admission with transfer to TCU if necessary.

## 2023-02-01 ENCOUNTER — TRANSITIONAL CARE UNIT VISIT (OUTPATIENT)
Dept: GERIATRICS | Facility: CLINIC | Age: 78
End: 2023-02-01
Payer: MEDICARE

## 2023-02-01 VITALS
HEART RATE: 59 BPM | HEIGHT: 70 IN | DIASTOLIC BLOOD PRESSURE: 55 MMHG | RESPIRATION RATE: 18 BRPM | BODY MASS INDEX: 27.84 KG/M2 | SYSTOLIC BLOOD PRESSURE: 116 MMHG | WEIGHT: 194.5 LBS | TEMPERATURE: 97.9 F | OXYGEN SATURATION: 96 %

## 2023-02-01 DIAGNOSIS — Z79.4 TYPE 2 DIABETES MELLITUS WITH OTHER CIRCULATORY COMPLICATION, WITH LONG-TERM CURRENT USE OF INSULIN (H): ICD-10-CM

## 2023-02-01 DIAGNOSIS — N18.32 STAGE 3B CHRONIC KIDNEY DISEASE (H): ICD-10-CM

## 2023-02-01 DIAGNOSIS — M79.671 HEEL PAIN, BILATERAL: ICD-10-CM

## 2023-02-01 DIAGNOSIS — M79.672 HEEL PAIN, BILATERAL: ICD-10-CM

## 2023-02-01 DIAGNOSIS — E11.59 TYPE 2 DIABETES MELLITUS WITH OTHER CIRCULATORY COMPLICATION, WITH LONG-TERM CURRENT USE OF INSULIN (H): ICD-10-CM

## 2023-02-01 DIAGNOSIS — M19.90 OSTEOARTHRITIS, UNSPECIFIED OSTEOARTHRITIS TYPE, UNSPECIFIED SITE: Primary | ICD-10-CM

## 2023-02-01 PROCEDURE — 99309 SBSQ NF CARE MODERATE MDM 30: CPT | Performed by: NURSE PRACTITIONER

## 2023-02-01 NOTE — LETTER
2/1/2023        RE: Ever Crane  [address]  [address]  [place] [address]        [facility] GERIATRICS  TCU FOLLOW UP VISIT    Chief Complaint   Patient presents with     RECHECK      Place of Service where encounter took place:  [facility] (TCU) [[id number]]    Ever Crane  is a 77 year old  (1945), who is being seen today at the above facility to discuss progress in therapy, review nursing home EHR, recheck chronic medical problems as well as address any new concerns.       HPI:    Copied forward from previous note: admitted to the above facility from [facility]. Emergency department stay 1/17/2023.  Patient's living condition: lives in an assisted living facility  Brief Summary of Hospital Course: patient was to be directly admitted to the tcu for wound care and therapy from his assisted living but due to concern over increase in redness to his wounds he was seen in the ER yesterday. He follows with Dr. brunner and patient is discussing amputation.   MEDICATION CHANGES: none  RECOMMENDED FOLLOW UP: vascular surgeon.   Updates on Status Since Skilled nursing Admission:   1/18 Check blood sugars 4 times daily diagnosis DM2  Change urea cream to apply to both legs daily  Add bactrim DS 1 tab po BID through 1/24.   1/19 saw vascular  1/19 MD visit  1/20 -Discontinue TobraDex, bisacodyl, and Pratropium, diclofenac, Mucinex, albuterol neb, serial labs, Vanicream and urea cream.  - Pillow boots when in bed  1/24 therapy reports moca 14/30 SLUMS 23/30 1/27 MRI's done of heels which were negative for osteomyelitis. The plan is for him to have right transmetatarsal amputation.     Today: Patient reports pain in both heels. That is worse with pressure. Patient doesn't know why he is on the celebrex or how long he has been on it.    External notes reviewed: Facility EHR including BM record, progress notes, vital signs. Shows no concerns. Blood sugars show no lows  "and pretty well controlled. Pharmacist recomends decrease and stopping celebrex.   Tests/results reviewed: None  HPI/ROS reviewed with Independent historian: None  HPI/ROS reviewed with other qualified health care personal: Nurse reports no concerns    ALLERGIES: Doxycycline, Latex, Penicillin v, and Penicillins    ROS:  4 point ROS including Respiratory, CV, GI and , other than that noted in the HPI,  is negative    Vitals:  /55   Pulse 59   Temp 97.9  F (36.6  C)   Resp 18   Ht 1.778 m (5' 10\")   Wt 88.2 kg (194 lb 8 oz)   SpO2 96%   BMI 27.91 kg/m    Exam:  GENERAL APPEARANCE:  Alert, in no distress  RESP:  respiratory effort normal  CV:   edema none  M/S:  Gait and station -observed sitting on the side of the bed  PSYCH:  insight and judgement, memory seems mild impaired, affect and mood normal    ASSESSMENT/PLAN:  Osteoarthritis, unspecified osteoarthritis type, unspecified site  Agree with pharmacist recommendations to decrease and hopefully discontinue Celebrex. Instructed patient to let us know if he is having more pain.  - decrease celebrex to 100 mg po every day x 7 days then change to 100 mg po every day prn for pain.     Heel pain, bilateral  Feel this is just from the debridement. He did have MRI done not too long ago which showed no signs of cellulitis.     Stage 3b chronic kidney disease (H)  Creatinine   Date Value Ref Range Status   01/24/2023 1.93 (H) 0.70 - 1.30 mg/dL Final   06/30/2018 1.20 0.66 - 1.25 mg/dL Final   nsaids generally should be avoided.       Orders:  - decrease celebrex to 100 mg po every day x 7 days then change to 100 mg po every day prn for pain.     Electronically signed by: Kellen Eid NP          Sincerely,        Kellen Eid NP      "

## 2023-02-01 NOTE — PROGRESS NOTES
[facility] GERIATRICS  TCU FOLLOW UP VISIT    Chief Complaint   Patient presents with     RECHECK      Place of Service where encounter took place:  [facility] ([facility]) [[id number]]    Ever Crane  is a 77 year old  (1945), who is being seen today at the above facility to discuss progress in therapy, review nursing home EHR, recheck chronic medical problems as well as address any new concerns.       HPI:    Copied forward from previous note: admitted to the above facility from [facility]. Emergency department stay 1/17/2023.  Patient's living condition: lives in an assisted living facility  Brief Summary of Hospital Course: patient was to be directly admitted to the tcu for wound care and therapy from his assisted living but due to concern over increase in redness to his wounds he was seen in the ER yesterday. He follows with Dr. brunner and patient is discussing amputation.   MEDICATION CHANGES: none  RECOMMENDED FOLLOW UP: vascular surgeon.   Updates on Status Since Skilled nursing Admission:   1/18 Check blood sugars 4 times daily diagnosis DM2  Change urea cream to apply to both legs daily  Add bactrim DS 1 tab po BID through 1/24.   1/19 saw vascular  1/19 MD visit  1/20 -Discontinue TobraDex, bisacodyl, and Pratropium, diclofenac, Mucinex, albuterol neb, serial labs, Vanicream and urea cream.  - Pillow boots when in bed  1/24 therapy reports moca 14/30 SLUMS 23/30 1/27 MRI's done of heels which were negative for osteomyelitis. The plan is for him to have right transmetatarsal amputation.     Today: Patient reports pain in both heels. That is worse with pressure. Patient doesn't know why he is on the celebrex or how long he has been on it.    External notes reviewed: Facility EHR including BM record, progress notes, vital signs. Shows no concerns. Blood sugars show no lows and pretty well controlled. Pharmacist recomends decrease and stopping celebrex.   Tests/results  "reviewed: None  HPI/ROS reviewed with Independent historian: None  HPI/ROS reviewed with other qualified health care personal: Nurse reports no concerns    ALLERGIES: Doxycycline, Latex, Penicillin v, and Penicillins    ROS:  4 point ROS including Respiratory, CV, GI and , other than that noted in the HPI,  is negative    Vitals:  /55   Pulse 59   Temp 97.9  F (36.6  C)   Resp 18   Ht 1.778 m (5' 10\")   Wt 88.2 kg (194 lb 8 oz)   SpO2 96%   BMI 27.91 kg/m    Exam:  GENERAL APPEARANCE:  Alert, in no distress  RESP:  respiratory effort normal  CV:   edema none  M/S:  Gait and station -observed sitting on the side of the bed  PSYCH:  insight and judgement, memory seems mild impaired, affect and mood normal    ASSESSMENT/PLAN:  Osteoarthritis, unspecified osteoarthritis type, unspecified site  Agree with pharmacist recommendations to decrease and hopefully discontinue Celebrex. Instructed patient to let us know if he is having more pain.  - decrease celebrex to 100 mg po every day x 7 days then change to 100 mg po every day prn for pain.     Heel pain, bilateral  Feel this is just from the debridement. He did have MRI done not too long ago which showed no signs of cellulitis.     Stage 3b chronic kidney disease (H)  Creatinine   Date Value Ref Range Status   01/24/2023 1.93 (H) 0.70 - 1.30 mg/dL Final   06/30/2018 1.20 0.66 - 1.25 mg/dL Final   nsaids generally should be avoided.     Type 2 diabetes mellitus with other circulatory complication, with long-term current use of insulin (H)  Well controlled with current regimen and currently getting blood sugar checks QID. Will decrease to BID at alternating times.     Orders:  - decrease celebrex to 100 mg po every day x 7 days then change to 100 mg po every day prn for pain.   - acetaminophen 650 mg BID and 650 mg every 6 hours PRN.  - change blood sugar checks to BID at alternating times.     Electronically signed by: Kellen Eid NP    "

## 2023-02-02 ENCOUNTER — TELEPHONE (OUTPATIENT)
Dept: VASCULAR SURGERY | Facility: CLINIC | Age: 78
End: 2023-02-02
Payer: MEDICARE

## 2023-02-02 DIAGNOSIS — I73.9 PAD (PERIPHERAL ARTERY DISEASE) (H): Primary | ICD-10-CM

## 2023-02-02 NOTE — TELEPHONE ENCOUNTER
Procedure: LLE ANGIOGRAM    Date: 2/7/23    Surgeon: MD Naveen Hamm    Called patient to review upcoming procedure. Reviewed visitor allowance of 1 person at this time.     Discussed procedure with patients and instructions: Yes    Reviewed Post Procedure Follow up plan and Appts made: Yes: 2/15 ultrasounds and 2/16 video visit    Reviewed Covid Test Scheduled/Completed: NA    Reviewed Blood Thinners and plan for holding, continuing and/or bridging:Xarelto- Please HOLD 3 days before starting 2/4/23.  Nothing to eat 8 hours before. Meds ok with sips of water    Reviewed Pre Op Appointment Required and Completed: N/A    Reviewed Allergies and if Contrast Allergy-Instructions and plan: N/A - no contrast allergy    Reviewed Arrival Time of 7am and procedure time of 8am and location of procedure [facility]:  [address] [address] (Phone: [phone], Fax: [phone])        All questions answered and number provided if any further questions arise or changes in patient status.

## 2023-02-03 NOTE — TELEPHONE ENCOUNTER
Pt called back to confirm details and instructions that were previously discussed with pt by MARION Romero.  Writer reviewed arrival time, location, fasting instructions, and medication instructions.      Pt seemed very confused and disoriented.  Pt confirmed that he wrote down the information and will call if he has any questions or concerns.

## 2023-02-07 ENCOUNTER — TELEPHONE (OUTPATIENT)
Dept: VASCULAR SURGERY | Facility: CLINIC | Age: 78
End: 2023-02-07

## 2023-02-07 ENCOUNTER — TRANSITIONAL CARE UNIT VISIT (OUTPATIENT)
Dept: GERIATRICS | Facility: CLINIC | Age: 78
End: 2023-02-07
Payer: MEDICARE

## 2023-02-07 VITALS
DIASTOLIC BLOOD PRESSURE: 52 MMHG | RESPIRATION RATE: 18 BRPM | SYSTOLIC BLOOD PRESSURE: 130 MMHG | HEART RATE: 62 BPM | WEIGHT: 194.5 LBS | OXYGEN SATURATION: 97 % | TEMPERATURE: 97.5 F | BODY MASS INDEX: 27.84 KG/M2 | HEIGHT: 70 IN

## 2023-02-07 DIAGNOSIS — M79.671 HEEL PAIN, BILATERAL: ICD-10-CM

## 2023-02-07 DIAGNOSIS — M19.90 OSTEOARTHRITIS, UNSPECIFIED OSTEOARTHRITIS TYPE, UNSPECIFIED SITE: Primary | ICD-10-CM

## 2023-02-07 DIAGNOSIS — E11.42 POLYNEUROPATHY DUE TO TYPE 2 DIABETES MELLITUS (H): ICD-10-CM

## 2023-02-07 DIAGNOSIS — N18.32 STAGE 3B CHRONIC KIDNEY DISEASE (H): ICD-10-CM

## 2023-02-07 DIAGNOSIS — Z79.4 TYPE 2 DIABETES MELLITUS WITH OTHER CIRCULATORY COMPLICATION, WITH LONG-TERM CURRENT USE OF INSULIN (H): ICD-10-CM

## 2023-02-07 DIAGNOSIS — E11.59 TYPE 2 DIABETES MELLITUS WITH OTHER CIRCULATORY COMPLICATION, WITH LONG-TERM CURRENT USE OF INSULIN (H): ICD-10-CM

## 2023-02-07 DIAGNOSIS — M79.672 HEEL PAIN, BILATERAL: ICD-10-CM

## 2023-02-07 PROCEDURE — 99308 SBSQ NF CARE LOW MDM 20: CPT | Performed by: NURSE PRACTITIONER

## 2023-02-07 NOTE — LETTER
2/7/2023        RE: Ever Crane  [address]  [address]  [place] [address]        [facility] GERIATRICS  TCU FOLLOW UP VISIT    Chief Complaint   Patient presents with     RECHECK      Place of Service where encounter took place:  [facility] (TCU) [[id number]]    Ever Crane  is a 77 year old  (1945), who is being seen today at the above facility to discuss progress in therapy, review nursing home EHR, recheck chronic medical problems as well as address any new concerns.       HPI:    Copied forward from previous note: admitted to the above facility from [facility]. Emergency department stay 1/17/2023.  Patient's living condition: lives in an assisted living facility  Brief Summary of Hospital Course: patient was to be directly admitted to the tcu for wound care and therapy from his assisted living but due to concern over increase in redness to his wounds he was seen in the ER yesterday. He follows with Dr. brunner and patient is discussing amputation.   MEDICATION CHANGES: none  RECOMMENDED FOLLOW UP: vascular surgeon.   Updates on Status Since Skilled nursing Admission:   1/18 Check blood sugars 4 times daily diagnosis DM2  Change urea cream to apply to both legs daily  Add bactrim DS 1 tab po BID through 1/24.   1/19 saw vascular  1/19 MD visit  1/20 -Discontinue TobraDex, bisacodyl, and Pratropium, diclofenac, Mucinex, albuterol neb, serial labs, Vanicream and urea cream.  - Pillow boots when in bed  1/24 therapy reports moca 14/30 SLUMS 23/30 1/27 MRI's done of heels which were negative for osteomyelitis. The plan is for him to have right transmetatarsal amputation.   2/1 - decrease celebrex to 100 mg po every day x 7 days then change to 100 mg po every day prn for pain.   - acetaminophen 650 mg BID and 650 mg every 6 hours PRN.  - change blood sugar checks to BID at alternating times.     Today: patient reports no worsening joint pain since  "decreasing the celebrex. It is set to be changed to PRN starting tomorrow. He does continue to have pain in both heels but is able to walk down the kwan without much difficulty.   External notes reviewed: Facility EHR including BM record, progress notes, vital signs. Shows no concerns. Blood sugars show no lows and pretty well controlled.   Tests/results reviewed: None  HPI/ROS reviewed with Independent historian: None  HPI/ROS reviewed with other qualified health care personal: Nurse reports no concerns    ALLERGIES: Doxycycline, Latex, Penicillin v, and Penicillins    ROS:  4 point ROS including Respiratory, CV, GI and , other than that noted in the HPI,  is negative    Vitals:  /52   Pulse 62   Temp 97.5  F (36.4  C)   Resp 18   Ht 1.778 m (5' 10\")   Wt 88.2 kg (194 lb 8 oz)   SpO2 97%   BMI 27.91 kg/m    Exam:  GENERAL APPEARANCE:  Alert, in no distress  RESP:  respiratory effort normal  CV:   edema none  M/S:  Gait and station -observed sitting on the side of the bed  PSYCH:  insight and judgement, memory seems mild impaired, affect and mood normal    ASSESSMENT/PLAN:  Osteoarthritis, unspecified osteoarthritis type, unspecified site  Controlled. Continue with plan to change celebrex to PRN.     Stage 3b chronic kidney disease (H)  Creatinine   Date Value Ref Range Status   01/24/2023 1.93 (H) 0.70 - 1.30 mg/dL Final   06/30/2018 1.20 0.66 - 1.25 mg/dL Final   nsaids generally should be avoided.     Type 2 diabetes mellitus with other circulatory complication, with long-term current use of insulin (H)  Well controlled with current regimen and currently getting blood sugar checks twice a day at alternating times..    Polyneuropathy due to type 2 diabetes mellitus (H)  Continues to work with therapy with strength balance and fall prevention.    Orders:  No changes    Electronically signed by: Kellen Eid NP          Sincerely,        Kellen Eid NP      "

## 2023-02-07 NOTE — TELEPHONE ENCOUNTER
RESCHEDULED ANGIOGRAM    Date: 2/13/23  Time: 10:00 AM  Arrival: 9:00 AM  Location: [facility]  Scheduled with Wilfred in IR scheduling on 2/7/23  Bloodthinners: pt on Xarelto - hold 3 days starting 2/10/23    Confirmed angiogram and [place] pt, [place]

## 2023-02-07 NOTE — TELEPHONE ENCOUNTER
RESCHEDULED ANGIOGRAM    Date: 2/15/23  Time: 10:00 AM  Arrival: 9:00 AM  Surgeon: Dr. Jackson to do  Location: [facility]  Scheduled with Isela in IR scheduling on 2/7/23  Bloodthinners: pt on Xarelto - hold 3 days starting 2/12/23    LMTCB on pt's sisters' phone.  [place] & called pt

## 2023-02-08 NOTE — TELEPHONE ENCOUNTER
Confirmed 2/15 angiogram with pt's sister, Marie.  She will be arranging pt's ride to [place] to arrive by 9:00 AM.

## 2023-02-08 NOTE — PROGRESS NOTES
[facility] GERIATRICS  TCU FOLLOW UP VISIT    Chief Complaint   Patient presents with     RECHECK      Place of Service where encounter took place:  [facility] (TCU) [[id number]]    Ever Crane  is a 77 year old  (1945), who is being seen today at the above facility to discuss progress in therapy, review nursing home EHR, recheck chronic medical problems as well as address any new concerns.       HPI:    Copied forward from previous note: admitted to the above facility from [facility]. Emergency department stay 1/17/2023.  Patient's living condition: lives in an assisted living facility  Brief Summary of Hospital Course: patient was to be directly admitted to the tcu for wound care and therapy from his assisted living but due to concern over increase in redness to his wounds he was seen in the ER yesterday. He follows with Dr. brunner and patient is discussing amputation.   MEDICATION CHANGES: none  RECOMMENDED FOLLOW UP: vascular surgeon.   Updates on Status Since Skilled nursing Admission:   1/18 Check blood sugars 4 times daily diagnosis DM2  Change urea cream to apply to both legs daily  Add bactrim DS 1 tab po BID through 1/24.   1/19 saw vascular  1/19 MD visit  1/20 -Discontinue TobraDex, bisacodyl, and Pratropium, diclofenac, Mucinex, albuterol neb, serial labs, Vanicream and urea cream.  - Pillow boots when in bed  1/24 therapy reports moca 14/30 SLUMS 23/30 1/27 MRI's done of heels which were negative for osteomyelitis. The plan is for him to have right transmetatarsal amputation.   2/1 - decrease celebrex to 100 mg po every day x 7 days then change to 100 mg po every day prn for pain.   - acetaminophen 650 mg BID and 650 mg every 6 hours PRN.  - change blood sugar checks to BID at alternating times.     Today: patient reports no worsening joint pain since decreasing the celebrex. It is set to be changed to PRN starting tomorrow. He does continue to have pain in  "both heels but is able to walk down the kwan without much difficulty.   External notes reviewed: Facility EHR including BM record, progress notes, vital signs. Shows no concerns. Blood sugars show no lows and pretty well controlled.   Tests/results reviewed: None  HPI/ROS reviewed with Independent historian: None  HPI/ROS reviewed with other qualified health care personal: Nurse reports no concerns    ALLERGIES: Doxycycline, Latex, Penicillin v, and Penicillins    ROS:  4 point ROS including Respiratory, CV, GI and , other than that noted in the HPI,  is negative    Vitals:  /52   Pulse 62   Temp 97.5  F (36.4  C)   Resp 18   Ht 1.778 m (5' 10\")   Wt 88.2 kg (194 lb 8 oz)   SpO2 97%   BMI 27.91 kg/m    Exam:  GENERAL APPEARANCE:  Alert, in no distress  RESP:  respiratory effort normal  CV:   edema none  M/S:  Gait and station -observed sitting on the side of the bed  PSYCH:  insight and judgement, memory seems mild impaired, affect and mood normal    ASSESSMENT/PLAN:  Osteoarthritis, unspecified osteoarthritis type, unspecified site  Controlled. Continue with plan to change celebrex to PRN.     Stage 3b chronic kidney disease (H)  Creatinine   Date Value Ref Range Status   01/24/2023 1.93 (H) 0.70 - 1.30 mg/dL Final   06/30/2018 1.20 0.66 - 1.25 mg/dL Final   nsaids generally should be avoided.     Type 2 diabetes mellitus with other circulatory complication, with long-term current use of insulin (H)  Well controlled with current regimen and currently getting blood sugar checks twice a day at alternating times..    Polyneuropathy due to type 2 diabetes mellitus (H)  Continues to work with therapy with strength balance and fall prevention.    Orders:  No changes    Electronically signed by: Kellen Eid NP    "

## 2023-02-15 ENCOUNTER — TRANSITIONAL CARE UNIT VISIT (OUTPATIENT)
Dept: GERIATRICS | Facility: CLINIC | Age: 78
End: 2023-02-15
Payer: MEDICARE

## 2023-02-15 ENCOUNTER — TELEPHONE (OUTPATIENT)
Dept: VASCULAR SURGERY | Facility: CLINIC | Age: 78
End: 2023-02-15

## 2023-02-15 VITALS
DIASTOLIC BLOOD PRESSURE: 65 MMHG | OXYGEN SATURATION: 93 % | HEART RATE: 72 BPM | RESPIRATION RATE: 18 BRPM | BODY MASS INDEX: 27.84 KG/M2 | WEIGHT: 194.5 LBS | HEIGHT: 70 IN | TEMPERATURE: 97.9 F | SYSTOLIC BLOOD PRESSURE: 137 MMHG

## 2023-02-15 DIAGNOSIS — N18.32 STAGE 3B CHRONIC KIDNEY DISEASE (H): ICD-10-CM

## 2023-02-15 DIAGNOSIS — M79.671 HEEL PAIN, BILATERAL: ICD-10-CM

## 2023-02-15 DIAGNOSIS — E11.42 POLYNEUROPATHY DUE TO TYPE 2 DIABETES MELLITUS (H): ICD-10-CM

## 2023-02-15 DIAGNOSIS — M19.90 OSTEOARTHRITIS, UNSPECIFIED OSTEOARTHRITIS TYPE, UNSPECIFIED SITE: Primary | ICD-10-CM

## 2023-02-15 DIAGNOSIS — M79.672 HEEL PAIN, BILATERAL: ICD-10-CM

## 2023-02-15 DIAGNOSIS — E11.59 TYPE 2 DIABETES MELLITUS WITH OTHER CIRCULATORY COMPLICATION, WITH LONG-TERM CURRENT USE OF INSULIN (H): ICD-10-CM

## 2023-02-15 DIAGNOSIS — Z79.4 TYPE 2 DIABETES MELLITUS WITH OTHER CIRCULATORY COMPLICATION, WITH LONG-TERM CURRENT USE OF INSULIN (H): ICD-10-CM

## 2023-02-15 PROCEDURE — 99308 SBSQ NF CARE LOW MDM 20: CPT | Performed by: NURSE PRACTITIONER

## 2023-02-15 NOTE — LETTER
2/15/2023        RE: Ever Crane  [address]  [address]  [place] [address]        [facility] GERIATRICS  TCU FOLLOW UP VISIT    Chief Complaint   Patient presents with     RECHECK      Place of Service where encounter took place:  [facility] (TCU) [[id number]]    Ever Crane  is a 77 year old  (1945), who is being seen today at the above facility to discuss progress in therapy, review nursing home EHR, recheck chronic medical problems as well as address any new concerns.       HPI:    Copied forward from previous note: admitted to the above facility from [facility]. Emergency department stay 1/17/2023.  Patient's living condition: lives in an assisted living facility  Brief Summary of Hospital Course: patient was to be directly admitted to the tcu for wound care and therapy from his assisted living but due to concern over increase in redness to his wounds he was seen in the ER yesterday. He follows with Dr. brunner and patient is discussing amputation.   MEDICATION CHANGES: none  RECOMMENDED FOLLOW UP: vascular surgeon.   Updates on Status Since Skilled nursing Admission:   1/18 Check blood sugars 4 times daily diagnosis DM2  Change urea cream to apply to both legs daily  Add bactrim DS 1 tab po BID through 1/24.   1/19 saw vascular  1/19 MD visit  1/20 -Discontinue TobraDex, bisacodyl, and Pratropium, diclofenac, Mucinex, albuterol neb, serial labs, Vanicream and urea cream.  - Pillow boots when in bed  1/24 therapy reports moca 14/30 SLUMS 23/30 1/27 MRI's done of heels which were negative for osteomyelitis. The plan is for him to have right transmetatarsal amputation.   2/1 - decrease celebrex to 100 mg po every day x 7 days then change to 100 mg po every day prn for pain.   - acetaminophen 650 mg BID and 650 mg every 6 hours PRN.  - change blood sugar checks to BID at alternating times.     Today: patient was supposed to go for an apt this morning but  "it was changed.  External notes reviewed: Facility EHR including BM record, progress notes, vital signs. Shows no concerns. Blood sugars show no lows, vitals stable. Has occasionally refused dressing changes when he doesn't like the person  Tests/results reviewed: None  HPI/ROS reviewed with Independent historian: None  HPI/ROS reviewed with other qualified health care personal: Nurse reports no concerns    ALLERGIES: Doxycycline, Latex, Penicillin v, and Penicillins    ROS:  4 point ROS including Respiratory, CV, GI and , other than that noted in the HPI,  is negative    Vitals:  /65   Pulse 72   Temp 97.9  F (36.6  C)   Resp 18   Ht 1.778 m (5' 10\")   Wt 88.2 kg (194 lb 8 oz)   SpO2 93%   BMI 27.91 kg/m    Exam:  GENERAL APPEARANCE:  Alert, in no distress  RESP:  respiratory effort normal  CV:   edema none  M/S:  Gait and station -observed sitting on the side of the bed  PSYCH:  insight and judgement, memory seems mild impaired, affect and mood normal    ASSESSMENT/PLAN:  Osteoarthritis, unspecified osteoarthritis type, unspecified site  Controlled. - continue PRN celebrex.     Stage 3b chronic kidney disease (H)  Keep kidney function in mind with medication changes and illness.    Type 2 diabetes mellitus with other circulatory complication, with long-term current use of insulin (H)  Well controlled with current regimen and currently getting blood sugar checks twice a day at alternating times..    Polyneuropathy due to type 2 diabetes mellitus  Ambulating about unit without concerns.    Orders:  No changes    Electronically signed by: Kellen Eid NP          Sincerely,        Kellen Eid NP      "

## 2023-02-15 NOTE — TELEPHONE ENCOUNTER
Spoke with pt's sister, Marie, regarding pt's ride to his angiogram this morning.  She had mistaken the times and arranged the ride for pt to be picked up at 9am.  Previously confirmed pt was supposed to arrive at [facility] by 9am.  She had confirmed with pt's facility[facility], that pt has not left as of 9:35am. Writer dvised that pt will likely need to reschedule angiogram. Writer notified IR charge nurse at [facility] of pt's situation - they will be calling [facility] to follow up.

## 2023-02-15 NOTE — TELEPHONE ENCOUNTER
Pt's ride didn't [place] [facility] [place] approximately 9:45am. [facility] confirmed pt will need to be rescheduled for his angiogram.  Pt's sister asked that we reschedule as soon as possible and call her back with the appointment information so she can work on arranging his ride.

## 2023-02-16 NOTE — PROGRESS NOTES
[facility] GERIATRICS  TCU FOLLOW UP VISIT    Chief Complaint   Patient presents with     RECHECK      Place of Service where encounter took place:  [facility] (TCU) [[id number]]    Ever Crane  is a 77 year old  (1945), who is being seen today at the above facility to discuss progress in therapy, review nursing home EHR, recheck chronic medical problems as well as address any new concerns.       HPI:    Copied forward from previous note: admitted to the above facility from [facility]. Emergency department stay 1/17/2023.  Patient's living condition: lives in an assisted living facility  Brief Summary of Hospital Course: patient was to be directly admitted to the tcu for wound care and therapy from his assisted living but due to concern over increase in redness to his wounds he was seen in the ER yesterday. He follows with Dr. brunner and patient is discussing amputation.   MEDICATION CHANGES: none  RECOMMENDED FOLLOW UP: vascular surgeon.   Updates on Status Since Skilled nursing Admission:   1/18 Check blood sugars 4 times daily diagnosis DM2  Change urea cream to apply to both legs daily  Add bactrim DS 1 tab po BID through 1/24.   1/19 saw vascular  1/19 MD visit  1/20 -Discontinue TobraDex, bisacodyl, and Pratropium, diclofenac, Mucinex, albuterol neb, serial labs, Vanicream and urea cream.  - Pillow boots when in bed  1/24 therapy reports moca 14/30 SLUMS 23/30 1/27 MRI's done of heels which were negative for osteomyelitis. The plan is for him to have right transmetatarsal amputation.   2/1 - decrease celebrex to 100 mg po every day x 7 days then change to 100 mg po every day prn for pain.   - acetaminophen 650 mg BID and 650 mg every 6 hours PRN.  - change blood sugar checks to BID at alternating times.     Today: patient was supposed to go for an apt this morning but it was changed.  External notes reviewed: Facility EHR including BM record, progress notes, vital  "signs. Shows no concerns. Blood sugars show no lows, vitals stable. Has occasionally refused dressing changes when he doesn't like the person  Tests/results reviewed: None  HPI/ROS reviewed with Independent historian: None  HPI/ROS reviewed with other qualified health care personal: Nurse reports no concerns    ALLERGIES: Doxycycline, Latex, Penicillin v, and Penicillins    ROS:  4 point ROS including Respiratory, CV, GI and , other than that noted in the HPI,  is negative    Vitals:  /65   Pulse 72   Temp 97.9  F (36.6  C)   Resp 18   Ht 1.778 m (5' 10\")   Wt 88.2 kg (194 lb 8 oz)   SpO2 93%   BMI 27.91 kg/m    Exam:  GENERAL APPEARANCE:  Alert, in no distress  RESP:  respiratory effort normal  CV:   edema none  M/S:  Gait and station -observed sitting on the side of the bed  PSYCH:  insight and judgement, memory seems mild impaired, affect and mood normal    ASSESSMENT/PLAN:  Osteoarthritis, unspecified osteoarthritis type, unspecified site  Controlled. - continue PRN celebrex.     Stage 3b chronic kidney disease (H)  Keep kidney function in mind with medication changes and illness.    Type 2 diabetes mellitus with other circulatory complication, with long-term current use of insulin (H)  Well controlled with current regimen and currently getting blood sugar checks twice a day at alternating times..    Polyneuropathy due to type 2 diabetes mellitus  Ambulating about unit without concerns.    Orders:  No changes    Electronically signed by: Kellen Eid NP    "

## 2023-02-20 ENCOUNTER — TELEPHONE (OUTPATIENT)
Dept: VASCULAR SURGERY | Facility: CLINIC | Age: 78
End: 2023-02-20
Payer: MEDICARE

## 2023-02-20 NOTE — TELEPHONE ENCOUNTER
Pt's sister Marie called to verify pt's upcoming appointment schedule.  She had questions regarding the plan for Dr. Spears on whether surgery will be needed after angiograms.  Routing message to Dr. Spears.

## 2023-02-21 ENCOUNTER — TELEPHONE (OUTPATIENT)
Dept: VASCULAR SURGERY | Facility: CLINIC | Age: 78
End: 2023-02-21
Payer: MEDICARE

## 2023-02-21 NOTE — TELEPHONE ENCOUNTER
Confirmed with pt's sister Marie that pt will be having his ride pick him up tomorrow at 745am to be at [facility] by 9:00am.    Angiogram is still on pt's appt schedule for a 10:00 AM start time.

## 2023-03-01 VITALS
WEIGHT: 183.4 LBS | HEIGHT: 70 IN | SYSTOLIC BLOOD PRESSURE: 125 MMHG | BODY MASS INDEX: 26.26 KG/M2 | DIASTOLIC BLOOD PRESSURE: 64 MMHG | TEMPERATURE: 98 F | RESPIRATION RATE: 18 BRPM | OXYGEN SATURATION: 95 % | HEART RATE: 70 BPM

## 2023-03-01 NOTE — PROGRESS NOTES
"[facility] GERIATRIC SERVICES  Chief Complaint   Patient presents with     detention Regulatory     [facility] Medical Record Number:  [id number]  Place of Service where encounter took place:  [facility] ([facility]) [[id number]]    HPI:    Ever Crane  is 77 year old (1945), who is being seen today for a federally mandated E/M visit.  HPI information obtained from: facility chart records, facility staff, patient report and [facility] chart review.     Brief Summary of Hospital Course:   * Pt with PMH notable for diabetic ulceration digits 2-5 Rt foot, stage 3 pressure ulceration of bilateral heels, ulceration left leg, OM Rt foot (4th toe PIP and DIP, 5th toe DIP), T2D and PAD. Was seen by LINO Spears on Jan 13th and transmetatarsal amputation with achillis tendon lengthening recommended. Recent EVELYNE showed poor perfusion to Rt foot.       Today's concerns are:  -  MRI on 1/27 negative for OM. Plan is Rt transmetatarsal amputation.     . - wound: LEFT calf wound is healing slowly, comes and goes. The one over the left foot is healed. : pt reports healing slowly.   - Rt leg/foot: \" I have a trouble with healing with. The doctor shaved my hell and every day since then, every day when I go to the bathroom it hurst me\".  Reports this has made his days miserable, feels doctor should have taken his time.     - rehab: pt reports completed therapy    - appetite, sleeps, BM are fine.     ===========================  MEDICATIONS:  Current Outpatient Medications   Medication Sig Dispense Refill     acetaminophen (TYLENOL) 325 MG tablet Take 650 mg by mouth every 6 hours as needed       albuterol (PROAIR HFA/PROVENTIL HFA/VENTOLIN HFA) 108 (90 Base) MCG/ACT inhaler Inhale 2 puffs into the lungs 2 times daily And q4h PRN       celecoxib (CELEBREX) 200 MG capsule Take 200 mg by mouth daily       cetirizine (ZYRTEC) 10 MG tablet Take 10 mg by mouth daily       cyanocobalamin (VITAMIN B-12) 2500 MCG SUBL sublingual tablet Place " "2,500 mcg under the tongue daily       furosemide (LASIX) 40 MG tablet Take 40 mg by mouth daily       glipiZIDE (GLUCOTROL) 10 MG tablet Take 10 mg by mouth 2 times daily (before meals)       ketoconazole (NIZORAL) 2 % external shampoo Apply topically twice a week Mon and Fri.       liraglutide (VICTOZA) 18 MG/3ML solution Inject 0.6 mg Subcutaneous daily       losartan (COZAAR) 25 MG tablet Take 25 mg by mouth daily       mineral oil-hydrophilic petrolatum (AQUAPHOR) external ointment Apply topically 2 times daily       mineral oil-hydrophilic petrolatum (AQUAPHOR) external ointment Apply topically 2 times daily       nitroGLYcerin (NITROSTAT) 0.4 MG sublingual tablet Place 0.4 mg under the tongue every 5 minutes as needed       omeprazole (PRILOSEC) 20 MG CR capsule Take 20 mg by mouth daily       polyethylene glycol (MIRALAX) 17 g packet Take 1 packet by mouth daily       rivaroxaban ANTICOAGULANT (XARELTO) 15 MG TABS tablet Take by mouth daily (with dinner)       senna-docusate (SENOKOT-S/PERICOLACE) 8.6-50 MG tablet Take 1 tablet by mouth 2 times daily And BID PRN       simvastatin (ZOCOR) 40 MG tablet Take 40 mg by mouth At Bedtime       spironolactone (ALDACTONE) 25 MG tablet Take 25 mg by mouth daily       sulfamethoxazole-trimethoprim (BACTRIM DS) 800-160 MG tablet Take 1 tablet by mouth 2 times daily 20 tablet 0     tamsulosin (FLOMAX) 0.4 MG capsule Take 0.4 mg by mouth daily         ROS:  4 point ROS including Respiratory, CV, GI and , other than that noted in the HPI,  is negative    Vitals:  /64   Pulse 70   Temp 98  F (36.7  C)   Resp 18   Ht 1.778 m (5' 10\")   Wt 83.2 kg (183 lb 6.4 oz)   SpO2 95%   BMI 26.32 kg/m    Body mass index is 26.32 kg/m .  Exam:  GENERAL APPEARANCE:  in no distress,   RESP:  Unlabored breathing. CTA b/l.   CV:  S1S2 audible, regular HR, no murmur appreciated.   ABDOMEN:  soft, NT/ND, BS audible.   M/S:  Left foot amputated.   SKIN:  Feet covered with " dressing. Legs with maroon discoloration.   NEURO:   No NFD appreciated on observation.   PSYCH:  affect and mood normal      Lab/Diagnostic data:  Reviewed in the chart and EHR.        ASSESSMENT/PLAN  ---------------------------  * Pt with PMH notable for diabetic ulceration digits 2-5 Rt foot, stage 3 pressure ulceration of bilateral heels, ulceration left leg, OM Rt foot (4th toe PIP and DIP, 5th toe DIP), T2D and PAD. Was seen by LINO Spaers on Jan 13th and s/p transmetatarsal amputation with achillis tendon lengthening recommended. Recent EVELYNE showed poor perfusion to Rt foot.     MRI on 1/27 negative for OM. Plan is Rt transmetatarsal amputation.   - analgesia suboptimal. currently on tylenol 650 mg prn and Celebrex prn. Will scheduled tylenol 1000 mg tid and 500 mg bid prn pain.   - wound care  - podiatrist follow up      T2D, with PAD, currently not on insulin (H)  - HbA1C 7.0% (July 2022)  - on multiple agents, continue.   - On Xarelto, statin for PAD. Continue.         Hx of CABG:   Persistent Atrial Fibrillation:   - HFpEF (H)  - CVR, not on rate on rhythm control. No concern,   - compensated.   - on losartan and aldactone. Compensated.   .  COPD, unspecified: respiratory wise compensated.       MCI:   - MOCA 14/30  But SLUM 23/30. obvious discrepancy.       BPH: on flomax. No concern      Electronically signed by:  Isabel Stephens MD

## 2023-03-02 ENCOUNTER — TRANSITIONAL CARE UNIT VISIT (OUTPATIENT)
Dept: GERIATRICS | Facility: CLINIC | Age: 78
End: 2023-03-02
Payer: MEDICARE

## 2023-03-02 DIAGNOSIS — E11.42 POLYNEUROPATHY DUE TO TYPE 2 DIABETES MELLITUS (H): ICD-10-CM

## 2023-03-02 DIAGNOSIS — I50.30 HEART FAILURE WITH PRESERVED EJECTION FRACTION, NYHA CLASS II (H): ICD-10-CM

## 2023-03-02 DIAGNOSIS — F03.90 DEMENTIA, UNSPECIFIED DEMENTIA SEVERITY, UNSPECIFIED DEMENTIA TYPE, UNSPECIFIED WHETHER BEHAVIORAL, PSYCHOTIC, OR MOOD DISTURBANCE OR ANXIETY (H): ICD-10-CM

## 2023-03-02 DIAGNOSIS — M86.9 OSTEOMYELITIS OF RIGHT FOOT, UNSPECIFIED TYPE (H): ICD-10-CM

## 2023-03-02 DIAGNOSIS — J44.9 CHRONIC OBSTRUCTIVE PULMONARY DISEASE, UNSPECIFIED COPD TYPE (H): ICD-10-CM

## 2023-03-02 DIAGNOSIS — Z79.4 TYPE 2 DIABETES MELLITUS WITH OTHER CIRCULATORY COMPLICATION, WITH LONG-TERM CURRENT USE OF INSULIN (H): Primary | ICD-10-CM

## 2023-03-02 DIAGNOSIS — I73.9 PAD (PERIPHERAL ARTERY DISEASE) (H): ICD-10-CM

## 2023-03-02 DIAGNOSIS — I10 ESSENTIAL HYPERTENSION: ICD-10-CM

## 2023-03-02 DIAGNOSIS — Z89.439: ICD-10-CM

## 2023-03-02 DIAGNOSIS — E11.59 TYPE 2 DIABETES MELLITUS WITH OTHER CIRCULATORY COMPLICATION, WITH LONG-TERM CURRENT USE OF INSULIN (H): Primary | ICD-10-CM

## 2023-03-02 PROCEDURE — 99309 SBSQ NF CARE MODERATE MDM 30: CPT | Performed by: FAMILY MEDICINE

## 2023-03-02 RX ORDER — ACETAMINOPHEN 325 MG/1
650 TABLET ORAL 2 TIMES DAILY PRN
COMMUNITY
Start: 2023-03-02

## 2023-03-08 ENCOUNTER — TRANSITIONAL CARE UNIT VISIT (OUTPATIENT)
Dept: GERIATRICS | Facility: CLINIC | Age: 78
End: 2023-03-08
Payer: MEDICARE

## 2023-03-08 VITALS
DIASTOLIC BLOOD PRESSURE: 63 MMHG | SYSTOLIC BLOOD PRESSURE: 127 MMHG | HEIGHT: 70 IN | HEART RATE: 67 BPM | OXYGEN SATURATION: 96 % | RESPIRATION RATE: 18 BRPM | WEIGHT: 182.9 LBS | BODY MASS INDEX: 26.18 KG/M2 | TEMPERATURE: 97.7 F

## 2023-03-08 DIAGNOSIS — N18.32 STAGE 3B CHRONIC KIDNEY DISEASE (H): ICD-10-CM

## 2023-03-08 DIAGNOSIS — E11.42 POLYNEUROPATHY DUE TO TYPE 2 DIABETES MELLITUS (H): Primary | ICD-10-CM

## 2023-03-08 DIAGNOSIS — M79.672 HEEL PAIN, BILATERAL: ICD-10-CM

## 2023-03-08 DIAGNOSIS — M79.671 HEEL PAIN, BILATERAL: ICD-10-CM

## 2023-03-08 PROCEDURE — 99309 SBSQ NF CARE MODERATE MDM 30: CPT | Performed by: NURSE PRACTITIONER

## 2023-03-08 RX ORDER — CELECOXIB 100 MG/1
100 CAPSULE ORAL DAILY PRN
COMMUNITY
End: 2023-04-26

## 2023-03-08 RX ORDER — GABAPENTIN 100 MG/1
200 CAPSULE ORAL 2 TIMES DAILY
COMMUNITY

## 2023-03-08 NOTE — LETTER
"    3/8/2023        RE: Ever Crane  [address]  [address]  [place] [address]        [facility]  Acute visit    Chief Complaint   Patient presents with     RECHECK     HPI:  Ever Crane is a 77 year old  (1945), who is being seen today for an episodic care visit at: [facility] ([facility]) [[id number]]. Today's concern is:      Polyneuropathy due to type 2 diabetes mellitus (H)  Heel pain, bilateral  Stage 3b chronic kidney disease (H)    Continues to complain of bilateral heel pain since the podiatrist debrided the wounds on his heels. Nurse requests review. It does not seem that the pain is worse since stopping the celebrex and the tylenol was scheduled but that doesn't seem to be helping either. Consideration of gabapentin discussed.     ROS:  4 point ROS including Respiratory, CV, GI and , other than that noted in the HPI,  is negative    Vitals:  /63   Pulse 67   Temp 97.7  F (36.5  C)   Resp 18   Ht 1.778 m (5' 10\")   Wt 83 kg (182 lb 14.4 oz)   SpO2 96%   BMI 26.24 kg/m    Exam:  General: alert. In no distress.     ASSESSMENT/PLAN:  Polyneuropathy due to type 2 diabetes mellitus (H)  Heel pain, bilateral  - continue tyelnol PRN. Difficult pain to treat. Previous MRI's showed no concern for infection and pain is not worse.   - add gabapentin 100 mg po BID    Stage 3b chronic kidney disease (H)  Dosed medications based on kidney function. Increase slowly as tolerated.      Orders:  Gabapentin 100 mg po BID    Electronically signed by: Kellen Eid NP           Sincerely,        Kellen Eid NP      "

## 2023-03-08 NOTE — PROGRESS NOTES
"[facility] GERIATRIC SERVICES  Acute visit    Chief Complaint   Patient presents with     RECHECK     HPI:  Ever Crane is a 77 year old  (1945), who is being seen today for an episodic care visit at: [facility] ([facility]) [[id number]]. Today's concern is:      Polyneuropathy due to type 2 diabetes mellitus (H)  Heel pain, bilateral  Stage 3b chronic kidney disease (H)    Continues to complain of bilateral heel pain since the podiatrist debrided the wounds on his heels. Nurse requests review. It does not seem that the pain is worse since stopping the celebrex and the tylenol was scheduled but that doesn't seem to be helping either. Consideration of gabapentin discussed.     ROS:  4 point ROS including Respiratory, CV, GI and , other than that noted in the HPI,  is negative    Vitals:  /63   Pulse 67   Temp 97.7  F (36.5  C)   Resp 18   Ht 1.778 m (5' 10\")   Wt 83 kg (182 lb 14.4 oz)   SpO2 96%   BMI 26.24 kg/m    Exam:  General: alert. In no distress.     ASSESSMENT/PLAN:  Polyneuropathy due to type 2 diabetes mellitus (H)  Heel pain, bilateral  - continue tyelnol PRN. Difficult pain to treat. Previous MRI's showed no concern for infection and pain is not worse.   - add gabapentin 100 mg po BID    Stage 3b chronic kidney disease (H)  Dosed medications based on kidney function. Increase slowly as tolerated.      Orders:  Gabapentin 100 mg po BID    Electronically signed by: Kellen Eid NP     "

## 2023-03-09 RX ORDER — NALOXONE HYDROCHLORIDE 0.4 MG/ML
0.4 INJECTION, SOLUTION INTRAMUSCULAR; INTRAVENOUS; SUBCUTANEOUS
Status: DISCONTINUED | OUTPATIENT
Start: 2023-03-09 | End: 2023-03-11 | Stop reason: HOSPADM

## 2023-03-09 RX ORDER — NALOXONE HYDROCHLORIDE 0.4 MG/ML
0.2 INJECTION, SOLUTION INTRAMUSCULAR; INTRAVENOUS; SUBCUTANEOUS
Status: DISCONTINUED | OUTPATIENT
Start: 2023-03-09 | End: 2023-03-11 | Stop reason: HOSPADM

## 2023-03-09 RX ORDER — FENTANYL CITRATE 50 UG/ML
25-50 INJECTION, SOLUTION INTRAMUSCULAR; INTRAVENOUS EVERY 5 MIN PRN
Status: DISCONTINUED | OUTPATIENT
Start: 2023-03-09 | End: 2023-03-11 | Stop reason: HOSPADM

## 2023-03-09 RX ORDER — HEPARIN SODIUM 200 [USP'U]/100ML
1 INJECTION, SOLUTION INTRAVENOUS CONTINUOUS PRN
Status: DISCONTINUED | OUTPATIENT
Start: 2023-03-09 | End: 2023-03-10

## 2023-03-09 RX ORDER — FLUMAZENIL 0.1 MG/ML
0.2 INJECTION, SOLUTION INTRAVENOUS
Status: DISCONTINUED | OUTPATIENT
Start: 2023-03-09 | End: 2023-03-10

## 2023-03-09 NOTE — TELEPHONE ENCOUNTER
[place] at [facility] [place] [phone]. Spoke to nurse and patient's sister.     Procedure: LLE Angio    Date: 3/10/23    Surgeon: MD Lois Jackson    Called patient to review upcoming procedure. Reviewed visitor allowance of 1 person at this time.     Discussed procedure with patients and instructions: Yes    Reviewed Post Procedure Follow up plan and Appts made: Yes: 3/24 ultrasounds and 3/27 office visit    Reviewed Covid Test Scheduled/Completed: NA    Reviewed Blood Thinners and plan for holding, continuing and/or bridging:Other- Xarelto hold for 24 hours    Reviewed Pre Op Appointment Required and Completed: N/A    Reviewed Allergies and if Contrast Allergy-Instructions and plan: no contrast allergy    Reviewed Arrival Time of 9am and procedure time of 10am and location of procedure [facility]:  [address] [place] [address] (phone: [phone], Fax: [phone])

## 2023-03-10 ENCOUNTER — HOSPITAL ENCOUNTER (OUTPATIENT)
Dept: INTERVENTIONAL RADIOLOGY/VASCULAR | Facility: HOSPITAL | Age: 78
Discharge: HOME | End: 2023-03-10
Attending: SURGERY | Admitting: SURGERY
Payer: MEDICARE

## 2023-03-10 VITALS
DIASTOLIC BLOOD PRESSURE: 56 MMHG | OXYGEN SATURATION: 92 % | RESPIRATION RATE: 16 BRPM | HEART RATE: 78 BPM | TEMPERATURE: 98.2 F | SYSTOLIC BLOOD PRESSURE: 123 MMHG

## 2023-03-10 DIAGNOSIS — E11.69 DM TYPE 2 WITH DIABETIC MIXED HYPERLIPIDEMIA (H): Primary | ICD-10-CM

## 2023-03-10 DIAGNOSIS — I73.9 PERIPHERAL VASCULAR DISEASE (H): ICD-10-CM

## 2023-03-10 DIAGNOSIS — E78.2 DM TYPE 2 WITH DIABETIC MIXED HYPERLIPIDEMIA (H): Primary | ICD-10-CM

## 2023-03-10 DIAGNOSIS — S81.802A UNSPECIFIED OPEN WOUND, LEFT LOWER LEG, INITIAL ENCOUNTER: ICD-10-CM

## 2023-03-10 DIAGNOSIS — Z89.432: ICD-10-CM

## 2023-03-10 LAB
ACT BLD: 160 SECONDS (ref 74–150)
ACT BLD: 280 SECONDS (ref 74–150)
ANION GAP SERPL CALCULATED.3IONS-SCNC: 13 MMOL/L (ref 7–15)
BUN SERPL-MCNC: 33.5 MG/DL (ref 8–23)
CALCIUM SERPL-MCNC: 9.1 MG/DL (ref 8.8–10.2)
CHLORIDE SERPL-SCNC: 101 MMOL/L (ref 98–107)
CREAT SERPL-MCNC: 1.35 MG/DL (ref 0.67–1.17)
DEPRECATED HCO3 PLAS-SCNC: 23 MMOL/L (ref 22–29)
ERYTHROCYTE [DISTWIDTH] IN BLOOD BY AUTOMATED COUNT: 14.2 % (ref 10–15)
GFR SERPL CREATININE-BSD FRML MDRD: 54 ML/MIN/1.73M2
GLUCOSE SERPL-MCNC: 149 MG/DL (ref 70–99)
HCT VFR BLD AUTO: 36.2 % (ref 40–53)
HGB BLD-MCNC: 11.3 G/DL (ref 13.3–17.7)
MCH RBC QN AUTO: 26.7 PG (ref 26.5–33)
MCHC RBC AUTO-ENTMCNC: 31.2 G/DL (ref 31.5–36.5)
MCV RBC AUTO: 85 FL (ref 78–100)
PLATELET # BLD AUTO: 269 10E3/UL (ref 150–450)
POTASSIUM SERPL-SCNC: 5.1 MMOL/L (ref 3.4–5.3)
RBC # BLD AUTO: 4.24 10E6/UL (ref 4.4–5.9)
SODIUM SERPL-SCNC: 137 MMOL/L (ref 136–145)
WBC # BLD AUTO: 10.9 10E3/UL (ref 4–11)

## 2023-03-10 PROCEDURE — 76937 US GUIDE VASCULAR ACCESS: CPT | Mod: 26 | Performed by: SURGERY

## 2023-03-10 PROCEDURE — 37228 PR REVASC TIB/PERON ART, ANGIOPLASTY INIT VESSEL: CPT | Mod: LT | Performed by: SURGERY

## 2023-03-10 PROCEDURE — 36415 COLL VENOUS BLD VENIPUNCTURE: CPT | Performed by: SURGERY

## 2023-03-10 PROCEDURE — 85027 COMPLETE CBC AUTOMATED: CPT | Performed by: SURGERY

## 2023-03-10 PROCEDURE — 250N000011 HC RX IP 250 OP 636: Performed by: SURGERY

## 2023-03-10 PROCEDURE — C1725 CATH, TRANSLUMIN NON-LASER: HCPCS

## 2023-03-10 PROCEDURE — 75710 ARTERY X-RAYS ARM/LEG: CPT | Mod: LT

## 2023-03-10 PROCEDURE — 75710 ARTERY X-RAYS ARM/LEG: CPT | Mod: 26 | Performed by: SURGERY

## 2023-03-10 PROCEDURE — C1769 GUIDE WIRE: HCPCS

## 2023-03-10 PROCEDURE — 99152 MOD SED SAME PHYS/QHP 5/>YRS: CPT

## 2023-03-10 PROCEDURE — 82310 ASSAY OF CALCIUM: CPT | Performed by: SURGERY

## 2023-03-10 PROCEDURE — 272N000302 HC DEVICE INFLATION CR5

## 2023-03-10 PROCEDURE — 99152 MOD SED SAME PHYS/QHP 5/>YRS: CPT | Performed by: SURGERY

## 2023-03-10 PROCEDURE — 85347 COAGULATION TIME ACTIVATED: CPT | Mod: 91

## 2023-03-10 PROCEDURE — 37228 HC REVASC TIB-PERON ART ANGIOPLASTY INIT VESSEL: CPT

## 2023-03-10 PROCEDURE — 250N000013 HC RX MED GY IP 250 OP 250 PS 637: Performed by: SURGERY

## 2023-03-10 RX ORDER — SODIUM CHLORIDE 9 MG/ML
INJECTION, SOLUTION INTRAVENOUS CONTINUOUS
Status: DISCONTINUED | OUTPATIENT
Start: 2023-03-10 | End: 2023-03-11 | Stop reason: HOSPADM

## 2023-03-10 RX ORDER — FENTANYL CITRATE 50 UG/ML
INJECTION, SOLUTION INTRAMUSCULAR; INTRAVENOUS PRN
Status: COMPLETED | OUTPATIENT
Start: 2023-03-10 | End: 2023-03-10

## 2023-03-10 RX ORDER — NALOXONE HYDROCHLORIDE 0.4 MG/ML
0.4 INJECTION, SOLUTION INTRAMUSCULAR; INTRAVENOUS; SUBCUTANEOUS
Status: DISCONTINUED | OUTPATIENT
Start: 2023-03-10 | End: 2023-03-10

## 2023-03-10 RX ORDER — PROTAMINE SULFATE 10 MG/ML
20 INJECTION, SOLUTION INTRAVENOUS ONCE
Status: COMPLETED | OUTPATIENT
Start: 2023-03-10 | End: 2023-03-10

## 2023-03-10 RX ORDER — IODIXANOL 320 MG/ML
100 INJECTION, SOLUTION INTRAVASCULAR ONCE
Status: DISCONTINUED | OUTPATIENT
Start: 2023-03-10 | End: 2023-03-11 | Stop reason: HOSPADM

## 2023-03-10 RX ORDER — OXYCODONE HYDROCHLORIDE 5 MG/1
5 TABLET ORAL EVERY 4 HOURS PRN
Status: DISCONTINUED | OUTPATIENT
Start: 2023-03-10 | End: 2023-03-11 | Stop reason: HOSPADM

## 2023-03-10 RX ORDER — FLUMAZENIL 0.1 MG/ML
0.2 INJECTION, SOLUTION INTRAVENOUS
Status: DISCONTINUED | OUTPATIENT
Start: 2023-03-10 | End: 2023-03-11 | Stop reason: HOSPADM

## 2023-03-10 RX ORDER — FENTANYL CITRATE 50 UG/ML
25-50 INJECTION, SOLUTION INTRAMUSCULAR; INTRAVENOUS EVERY 5 MIN PRN
Status: DISCONTINUED | OUTPATIENT
Start: 2023-03-10 | End: 2023-03-11 | Stop reason: HOSPADM

## 2023-03-10 RX ORDER — NALOXONE HYDROCHLORIDE 0.4 MG/ML
0.2 INJECTION, SOLUTION INTRAMUSCULAR; INTRAVENOUS; SUBCUTANEOUS
Status: DISCONTINUED | OUTPATIENT
Start: 2023-03-10 | End: 2023-03-10

## 2023-03-10 RX ORDER — HEPARIN SODIUM 1000 [USP'U]/ML
6000 INJECTION, SOLUTION INTRAVENOUS; SUBCUTANEOUS ONCE
Status: COMPLETED | OUTPATIENT
Start: 2023-03-10 | End: 2023-03-10

## 2023-03-10 RX ORDER — LIDOCAINE 40 MG/G
CREAM TOPICAL
Status: DISCONTINUED | OUTPATIENT
Start: 2023-03-10 | End: 2023-03-11 | Stop reason: HOSPADM

## 2023-03-10 RX ORDER — ACETAMINOPHEN 325 MG/1
650 TABLET ORAL EVERY 6 HOURS PRN
Status: DISCONTINUED | OUTPATIENT
Start: 2023-03-10 | End: 2023-03-11 | Stop reason: HOSPADM

## 2023-03-10 RX ORDER — CLOPIDOGREL BISULFATE 75 MG/1
75 TABLET ORAL DAILY
Qty: 90 TABLET | Refills: 1 | Status: SHIPPED | OUTPATIENT
Start: 2023-03-10 | End: 2023-08-05

## 2023-03-10 RX ORDER — CLOPIDOGREL BISULFATE 75 MG/1
300 TABLET ORAL ONCE
Status: COMPLETED | OUTPATIENT
Start: 2023-03-10 | End: 2023-03-10

## 2023-03-10 RX ORDER — HEPARIN SODIUM 200 [USP'U]/100ML
1 INJECTION, SOLUTION INTRAVENOUS CONTINUOUS PRN
Status: DISCONTINUED | OUTPATIENT
Start: 2023-03-10 | End: 2023-03-11 | Stop reason: HOSPADM

## 2023-03-10 RX ADMIN — HEPARIN SODIUM 6000 UNITS: 1000 INJECTION INTRAVENOUS; SUBCUTANEOUS at 11:36

## 2023-03-10 RX ADMIN — FENTANYL CITRATE 50 MCG: 50 INJECTION, SOLUTION INTRAMUSCULAR; INTRAVENOUS at 11:12

## 2023-03-10 RX ADMIN — FENTANYL CITRATE 50 MCG: 50 INJECTION, SOLUTION INTRAMUSCULAR; INTRAVENOUS at 11:43

## 2023-03-10 RX ADMIN — PROTAMINE SULFATE 20 MG: 10 INJECTION, SOLUTION INTRAVENOUS at 11:56

## 2023-03-10 RX ADMIN — CLOPIDOGREL BISULFATE 300 MG: 75 TABLET ORAL at 14:39

## 2023-03-10 RX ADMIN — MIDAZOLAM 1 MG: 1 INJECTION INTRAMUSCULAR; INTRAVENOUS at 11:25

## 2023-03-10 RX ADMIN — OXYCODONE HYDROCHLORIDE 5 MG: 5 TABLET ORAL at 14:40

## 2023-03-10 RX ADMIN — MIDAZOLAM 1 MG: 1 INJECTION INTRAMUSCULAR; INTRAVENOUS at 11:08

## 2023-03-10 NOTE — PRE-PROCEDURE
GENERAL PRE-PROCEDURE:   Procedure:  Left leg angiogram, possible intervention.  Monitoring and sedation for procedure.  Date/Time:  3/10/2023 10:42 AM    Verbal consent obtained?: Yes    Written consent obtained?: Yes    Risks and benefits: Risks, benefits and alternatives were discussed    Consent given by:  Patient  Patient states understanding of procedure being performed: Yes    Patient's understanding of procedure matches consent: Yes    Procedure consent matches procedure scheduled: Yes    Expected level of sedation:  Moderate  Appropriately NPO:  Yes  ASA Class:  2  Mallampati  :  Grade 2- soft palate, base of uvula, tonsillar pillars, and portion of posterior pharyngeal wall visible  Lungs:  Lungs clear with good breath sounds bilaterally  Heart:  Normal heart sounds and rate  History & Physical reviewed:  History and physical reviewed and no updates needed  Statement of review:  I have reviewed the lab findings, diagnostic data, medications, and the plan for sedation

## 2023-03-10 NOTE — PROVIDER NOTIFICATION
Pt given discharge instructions without questions or concers and pt assisted to car by MARILEE and Desmond ayon.

## 2023-03-10 NOTE — PROVIDER NOTIFICATION
Report callled to MARION Cuevas at [facility] [place] [facility] without questions or concerns and she is aware to look for pt RX at [facility] beginning tomorrow.

## 2023-03-10 NOTE — DISCHARGE INSTRUCTIONS
Angiogram Discharge Instructions:    You had an angiogram procedure. An angiogram is a procedure thatuses x-rays to take pictures of your blood vessels. A long, flexible tube or catheter is inserted through the blood stream (through the procedure site) to help deliver contrast (dye) into the arteries so they can be visibleon the x-ray. Angiograms are used to evaluate and treat possible blockages or other disease in the arterial system. Please follow the below instructions after your angiogram, including monitoring of your procedure site.    Care instructions after angiogram procedure:    - If you received sedation for your procedure, do not drive or operate heavy machinery for the rest of the day.    - Do not lift objects greater than 10 poundsfor 2 days following angiogram procedure.    - Avoid excessive exercise and straining for 2 days.    - Avoid tub baths, pools, hot tubs and Jacuzzis for 3 days or until procedure site is well healed.    - Youmay shower beginning tomorrow. Do not scrub procedure site until well healed; pat dry.    - Return to your normal activities as you tolerate after the 2 day restriction.    - You can expect to return to work 1-2days after your procedure - depending on the nature of your profession.    - It is normal to have some tenderness and minimal swelling at procedure puncture site. A small area of discoloration may be present.Tenderness typically subsides in 1-2 days. A small knot may also be present at puncture site for 6-8 weeks. This can be a normal part of the healing process.    Medications and other post-procedure care:    -If you had a blockage opened please make sure to fill your prescription for any new medication, such as Plavix, that you may have been prescribed and take it everyday    - If you have kidney function issues, please makesure to hydrate yourself well for the next two days by drinking lots of fluids to help clear your body of the dye used. If you have heart  problems such as heart failure, this may have to be moderated.    - If you are onMetformin, please do not resume it for 48hrs.    Follow up:    - Follow up with your vascular surgery team at the [facility] vascular center A follow up appointment should already be arranged for you.If you are unsure of your appointment or do not have a follow up appointment in the next 2-3 weeks, please call our office at [phone]. You will need to have an ultrasound 2-3 weeks after your angiogram and should bescheduled at the time of your follow up appt.    Further follow up will be based on ultrasound results. Typical follow up is every 3 months for the first year, then every 6 months to one year thereafter.    seek medical evaluation for:    - If you develop fevers (greater than 101 F (38.3C)).    - If you develop increasing pain, redness, purulent drainage, tenderness, or swelling at procedure site.    If you experience any bleeding from procedure/puncture site: lie down, firmly apply pressure to puncture site and call 911.    - Seek emergent evaluation if you experience any new leg/arm pain, discoloration ornumbness.    Call the vascular center at [phone] with questions/concerns or if you have any of the above symptoms.

## 2023-03-11 NOTE — OP NOTE
Vascular surgery interventional procedure note    Date: 03/11/23     Procedures Performed:    Ir Lower Extremity Angiogram Left  from the left superficial femoral artery level via left groin approach    Ultrasound-guided vascular access    Angioplasty of proximal peroneal artery and tibioperoneal trunk  .  Provider:  Lois Jackson MD   Assistant:  Mell Watkins MD, vascular surgery fellow    Indication:  This a gentleman with advanced bilateral peripheral vascular disease who has undergone a left leg transmetatarsal amputation that is developed some new wounds.  Duplex suggests infrapopliteal disease.  Plan for angiogram and possible intervention to optimize wound healing.  Patient has some baseline chronic renal dysfunction and so an attempt will be made to minimize contrast exposure.    Sedation:    The procedure was performed with administration of intravenous conscious sedation with appropriate preoperative, intraoperative, and postoperative evaluation.  45 minutes of supervised face to face intraservice time was provided by a radiology nurse under my direct supervision.  Versed 2 mg Fentanyl 100 mcg given.    ANTIBIOTICS: None    FLUOROSCOPIC TIME:   Minutes    RADIATION DOSE: 49 mGy ; 3.7 Minutes ; 0 mGy     CONTRAST: 25 cc Visipaque      Procedure:    Ultrasound was used to evaluate the left groin.  The selected vessel was  widely patent.  It was the common femoral artery. Ultrasound was then used for real-time ultrasound guided needle entry of the common femoral artery. Permanent images were recorded and saved to the patient's medical record.    Under ultrasound guidance we advanced a J-wire through the 19-gauge needle into the SFA.  We confirmed its location with fluoroscopy.  We then exchanged in a 4 [place] sheath.  Selective left leg angiography was performed from the sheath level in the SFA    Findings: Widely patent superficial femoral artery without disease.  Patent popliteal artery without obvious  disease.  There is single-vessel peroneal runoff with some beading and a few focal stenoses 1 of which looks almost complete for segment of less than a centimeter.  The peroneal artery is a good caliber vessel beyond this and collateralizes across the ankle to provide flow to the foot.  The posterior tibial artery is visible and reconstitutes through collaterals.  It appears to be a fairly wispy vessel but after occlusions we can see that it does provide flow through the pedal arch.  There is no evidence of anterotibial artery at all.    Given these findings we elected to treat the proximal tib peroneal trunk and peroneal artery disease.  A V18 wire was advanced straight down the popliteal artery and through the occlusions.  An 018 Timmons cross catheter was advanced with it past the occlusions.  It was used to perform an angiogram in the peroneal artery    Findings: Intraluminal location of a catheter in the peroneal artery    At this point we introduced a wire again and over advanced a 3 mm x 100 mm angioplasty balloon was used to treat the peroneal artery and tibioperoneal trunk.  Inflation was to 10 shantel of pressure and held up for 2 minutes.  Completion angiogram was performed.  This showed widely patent flow through this area without evidence of dissection or distal embolization.  I felt that this is significantly improved in the flow to the foot.  We contemplated treating the posterior tibial artery but given that the patient has renal dysfunction and that that would be more challenging, and more importantly that we probably had improved flow enough to allow wound healing we elected not to proceed.  Sheath left in and patient given 20 use of protamine with the plan of removing the sheath once ACT below 260.        Impression:  Identification and successful treatment of proximal tibial peroneal trunk and peroneal artery focal but critical disease.  Posterior tibial advanced long segment disease identified which  could theoretically be treated but was not given the patient's renal dysfunction and need for only marginal improvement in flow hopefully.  Will need to be seen in clinic in 2 weeks time to see how his wounds are coming along at that time we will need TCO twos and ABIs as toe pressures are not possible status post transmetatarsal amputation.  Need to be kept on dual antiplatelet therapy for at least 3 months.

## 2023-03-20 ENCOUNTER — DOCUMENTATION ONLY (OUTPATIENT)
Dept: OTHER | Facility: CLINIC | Age: 78
End: 2023-03-20
Payer: MEDICARE

## 2023-03-23 ENCOUNTER — TRANSITIONAL CARE UNIT VISIT (OUTPATIENT)
Dept: GERIATRICS | Facility: CLINIC | Age: 78
End: 2023-03-23
Payer: MEDICARE

## 2023-03-23 VITALS
HEART RATE: 66 BPM | DIASTOLIC BLOOD PRESSURE: 60 MMHG | BODY MASS INDEX: 26.28 KG/M2 | TEMPERATURE: 98.4 F | WEIGHT: 183.6 LBS | SYSTOLIC BLOOD PRESSURE: 123 MMHG | RESPIRATION RATE: 20 BRPM | HEIGHT: 70 IN | OXYGEN SATURATION: 94 %

## 2023-03-23 DIAGNOSIS — M79.672 HEEL PAIN, BILATERAL: ICD-10-CM

## 2023-03-23 DIAGNOSIS — E11.42 POLYNEUROPATHY DUE TO TYPE 2 DIABETES MELLITUS (H): Primary | ICD-10-CM

## 2023-03-23 DIAGNOSIS — M79.671 HEEL PAIN, BILATERAL: ICD-10-CM

## 2023-03-23 DIAGNOSIS — I73.9 PAD (PERIPHERAL ARTERY DISEASE) (H): ICD-10-CM

## 2023-03-23 DIAGNOSIS — N18.32 STAGE 3B CHRONIC KIDNEY DISEASE (H): ICD-10-CM

## 2023-03-23 PROCEDURE — 99309 SBSQ NF CARE MODERATE MDM 30: CPT | Performed by: NURSE PRACTITIONER

## 2023-03-23 RX ORDER — OXYCODONE HYDROCHLORIDE 5 MG/1
2.5 TABLET ORAL EVERY 6 HOURS PRN
Qty: 24 TABLET | Refills: 0 | Status: SHIPPED | OUTPATIENT
Start: 2023-03-23 | End: 2023-05-03

## 2023-03-23 NOTE — LETTER
"    3/23/2023        RE: Ever Crane  [address]  [address]  [place] [address]        [facility]  Acute visit    Chief Complaint   Patient presents with     RECHECK     HPI:  Ever Crane is a 77 year old  (1945), who is being seen today for an episodic care visit at: [facility] ([facility]) [[id number]]. Today's concern is:      Polyneuropathy due to type 2 diabetes mellitus (H)  Heel pain, bilateral  Stage 3b chronic kidney disease (H)  PAD (peripheral artery disease) (H)    Nurse reporting that patient's pain consent continues to not be controlled.  Complains of bilateral heel pain with ambulation.  Yesterday he had behaviors of taking his dressings off and getting blood all over the floor in the bathroom and not allowing staff to clean it up.  He occasionally refuses to have his dressings changed.  He continues to follow with vascular surgery and podiatry and wound care NP.  Last note from wound care NP reviewed from 3/15 and wound continue to improve. he continues on scheduled Tylenol and as needed Celebrex. Nursing home EHR reviewed including MAR he is not getting any of the Celebrex so we will discontinue that    ROS:  4 point ROS including Respiratory, CV, GI and , other than that noted in the HPI,  is negative    Vitals:  /60   Pulse 66   Temp 98.4  F (36.9  C)   Resp 20   Ht 1.778 m (5' 10\")   Wt 83.3 kg (183 lb 9.6 oz)   SpO2 94%   BMI 26.34 kg/m    Exam:  General: Alert.  In no distress.  Both legs wrapped.    ASSESSMENT/PLAN:  Polyneuropathy due to type 2 diabetes mellitus (H)  Heel pain, bilateral  Stage 3b chronic kidney disease (H)  PAD (peripheral artery disease) (H)  Pain is worse with pressure on the heels.  Do not suspect infection from wound care notes and type of pain.  Due to kidney function will have to start low on pain medication  - oxyCODONE (ROXICODONE) 5 MG tablet; Take 0.5 tablets (2.5 mg) by mouth every 6 hours as needed for " pain    Orders:  - Gabapentin to 200 mg p.o. twice daily.  -Oxycodone 5 mg take one half tab p.o. every 6 hours as needed for pain  -Discontinue Celebrex      Electronically signed by: Kellen Eid NP                 Sincerely,        Kellen Eid NP

## 2023-03-23 NOTE — PROGRESS NOTES
"[facility]  Acute visit    Chief Complaint   Patient presents with     RECHECK     HPI:  Ever Crane is a 77 year old  (1945), who is being seen today for an episodic care visit at: [facility] ([facility]) [[id number]]. Today's concern is:      Polyneuropathy due to type 2 diabetes mellitus (H)  Heel pain, bilateral  Stage 3b chronic kidney disease (H)  PAD (peripheral artery disease) (H)    Nurse reporting that patient's pain consent continues to not be controlled.  Complains of bilateral heel pain with ambulation.  Yesterday he had behaviors of taking his dressings off and getting blood all over the floor in the bathroom and not allowing staff to clean it up.  He occasionally refuses to have his dressings changed.  He continues to follow with vascular surgery and podiatry and wound care NP.  Last note from wound care NP reviewed from 3/15 and wound continue to improve. he continues on scheduled Tylenol and as needed Celebrex. Nursing home EHR reviewed including MAR he is not getting any of the Celebrex so we will discontinue that    ROS:  4 point ROS including Respiratory, CV, GI and , other than that noted in the HPI,  is negative    Vitals:  /60   Pulse 66   Temp 98.4  F (36.9  C)   Resp 20   Ht 1.778 m (5' 10\")   Wt 83.3 kg (183 lb 9.6 oz)   SpO2 94%   BMI 26.34 kg/m    Exam:  General: Alert.  In no distress.  Both legs wrapped.    ASSESSMENT/PLAN:  Polyneuropathy due to type 2 diabetes mellitus (H)  Heel pain, bilateral  Stage 3b chronic kidney disease (H)  PAD (peripheral artery disease) (H)  Pain is worse with pressure on the heels.  Do not suspect infection from wound care notes and type of pain.  Due to kidney function will have to start low on pain medication  - oxyCODONE (ROXICODONE) 5 MG tablet; Take 0.5 tablets (2.5 mg) by mouth every 6 hours as needed for pain    Orders:  - Gabapentin to 200 mg p.o. twice daily.  -Oxycodone 5 mg take one half tab p.o. every 6 hours as " needed for pain  -Discontinue Celebrex      Electronically signed by: Kellen Eid NP

## 2023-03-24 ENCOUNTER — ANCILLARY PROCEDURE (OUTPATIENT)
Dept: VASCULAR ULTRASOUND | Facility: CLINIC | Age: 78
End: 2023-03-24
Attending: SURGERY
Payer: MEDICARE

## 2023-03-24 DIAGNOSIS — I73.9 PAD (PERIPHERAL ARTERY DISEASE) (H): ICD-10-CM

## 2023-03-24 PROCEDURE — 93923 UPR/LXTR ART STDY 3+ LVLS: CPT | Mod: 26 | Performed by: SURGERY

## 2023-03-24 PROCEDURE — 93925 LOWER EXTREMITY STUDY: CPT

## 2023-03-24 PROCEDURE — 93925 LOWER EXTREMITY STUDY: CPT | Mod: 26 | Performed by: SURGERY

## 2023-03-24 PROCEDURE — 93923 UPR/LXTR ART STDY 3+ LVLS: CPT

## 2023-03-27 ENCOUNTER — ANCILLARY PROCEDURE (OUTPATIENT)
Dept: VASCULAR ULTRASOUND | Facility: CLINIC | Age: 78
End: 2023-03-27
Attending: SURGERY
Payer: MEDICARE

## 2023-03-27 ENCOUNTER — OFFICE VISIT (OUTPATIENT)
Dept: VASCULAR SURGERY | Facility: CLINIC | Age: 78
End: 2023-03-27
Attending: SURGERY
Payer: MEDICARE

## 2023-03-27 VITALS — SYSTOLIC BLOOD PRESSURE: 132 MMHG | HEART RATE: 72 BPM | DIASTOLIC BLOOD PRESSURE: 60 MMHG

## 2023-03-27 DIAGNOSIS — I73.9 PAD (PERIPHERAL ARTERY DISEASE) (H): ICD-10-CM

## 2023-03-27 DIAGNOSIS — I73.9 PAD (PERIPHERAL ARTERY DISEASE) (H): Primary | ICD-10-CM

## 2023-03-27 PROCEDURE — 99213 OFFICE O/P EST LOW 20 MIN: CPT | Performed by: SURGERY

## 2023-03-27 PROCEDURE — G0463 HOSPITAL OUTPT CLINIC VISIT: HCPCS | Mod: 25 | Performed by: SURGERY

## 2023-03-27 PROCEDURE — 93922 UPR/L XTREMITY ART 2 LEVELS: CPT | Mod: 52

## 2023-03-27 PROCEDURE — 93922 UPR/L XTREMITY ART 2 LEVELS: CPT | Mod: 26 | Performed by: SURGERY

## 2023-03-27 ASSESSMENT — PAIN SCALES - GENERAL: PAINLEVEL: WORST PAIN (10)

## 2023-03-27 NOTE — PROGRESS NOTES
VASCULAR SURGERY OUTPATIENT CONSULT OR VISIT   VASCULAR SURGEON: Lois Jackson MD    LOCATION:  [facility]    Ever Crane   Medical Record #:  [id number]  YOB: 1945  Age:  77 year old     Date of Service: 3/27/2023    PRIMARY CARE PROVIDER: Services, [facility]      Reason for consultation: Surveillance of critical limb ischemia    IMPRESSION: Patient with bilateral heel wounds and right distal foot wounds.  Critically poor TcPO2's at 3 on the right and 13 on the left.  Normal ABIs bilaterally.    RECOMMENDATION: Blood flow studies suggest adequate blood flow to the level of the ankle.  Uncertain how much flow can get to the heels.  TcPO2's are likely falsely low given the fairly significant edema in both feet but it is far from clear that he has enough blood flow to heal.  Strongly advised him to keep all pressure off his heels.  We will see Dr. Spears in the next 2 weeks.  Nothing further to be done from a revascularization standpoint unfortunately.    HPI:  Ever Crane is a 77 year old male who was seen today for follow-up after his right leg angio intervention which involved tibioperoneal trunk angioplasty.  Only collateral flow beyond the ankle however.  Since then patient has been on dual antiplatelet therapy and anticoagulation.  According to patient he is receiving excellent wound care from his wound care provider and she feels that things are coming along quite nicely.  Still has significant pain with his heels.  Toe wounds on the right foot essentially healed.  No wounds on the transmetatarsal amputation any longer.  On anticoagulation and Plavix.    PHH:    Past Medical History:   Diagnosis Date     A-fib (H)      MESHA (acute kidney injury) (H)      Atopic keratoconjunctivitis      Atrial fibrillation (H)     Brian Moe: 8/2011 Cardioversion; CHADS2 VASC = 5; he is on warfarin and sotalol      Atrial flutter (H)      Mcgarry's esophagus 1/1/2012    per note of  Dr. Tavo Mike of [facility]     Bilateral lower leg cellulitis 8/31/2018     Candidiasis of perineum 1/3/2018     Carotid stenosis, asymptomatic, right      CHF (congestive heart failure) (H)      Cholecystitis, acute 8/18/2019     Closed nondisplaced intertrochanteric fracture of left femur with routine healing, subsequent encounter 5/18/2022     Coronary artery disease due to lipid rich plaque 2000    CABG x2     Diabetes (H)      Diabetic ulcer of both feet (H) 10/31/2017     Dyslexia      Dyslipidemia, goal LDL below 70 2000     Epistaxis      Essential hypertension      Gangrene of left foot (H)      GERD (gastroesophageal reflux disease)      HLD (hyperlipidemia)      HTN (hypertension)      Hyponatremia      MRSA (methicillin resistant Staphylococcus aureus)      Neuropathy      Non-STEMI (non-ST elevated myocardial infarction) (H)      Other atopic dermatitis      Peripheral vascular disease (H)      Pneumonia 6/26/2018     Type 2 diabetes mellitus, without long-term current use of insulin (H)      Unable to function independently 11/13/2017        Past Surgical History:   Procedure Laterality Date     AMPUTATE FOOT Left 11/05/2017    Procedure: LEFT TRANSMETATARSAL AMPUTATION;  Surgeon: Ever Wick MD;  Location: [facility] OR;  Service:      BYPASS GRAFT ARTERY CORONARY N/A 03/06/2000    SVG to OM1, SVG to PDA     BYPASS GRAFT ARTERY CORONARY N/A 10/04/2018    redo CABG due to graft failure     CABG MEASURES GRP       CARDIOVERSION  08/25/2011     CV CORONARY ANGIOGRAM N/A 10/01/2018    Procedure: Coronary Angiogram;  Surgeon: Miki Mac MD;  Location: [facility] Cath Lab;  Service:      INGUINAL HERNIA REPAIR Bilateral 1969    and 1979     IR EXTREMITY ANGIOGRAM BILATERAL  11/3/2017     IR LOWER EXTREMITY ANGIOGRAM LEFT  3/10/2023     IR LOWER EXTREMITY ANGIOGRAM RIGHT  1/24/2023     LAPAROSCOPIC CHOLECYSTECTOMY N/A 08/18/2019    Procedure: CHOLECYSTECTOMY, LAPAROSCOPIC;  Surgeon: Essie  Stewart LANGSTON MD;  Location: [facility];  Service: General       ALLERGIES:  Doxycycline, Latex, Penicillin v, and Penicillins    MEDS:    Current Outpatient Medications:      acetaminophen (TYLENOL) 325 MG tablet, 1000 mg tid scheduled and 500 mg bid prn, Disp: , Rfl:      albuterol (PROAIR HFA/PROVENTIL HFA/VENTOLIN HFA) 108 (90 Base) MCG/ACT inhaler, Inhale 2 puffs into the lungs 2 times daily And q4h PRN, Disp: , Rfl:      celecoxib (CELEBREX) 100 MG capsule, Take 100 mg by mouth daily as needed, Disp: , Rfl:      cetirizine (ZYRTEC) 10 MG tablet, Take 10 mg by mouth daily, Disp: , Rfl:      clopidogrel (PLAVIX) 75 MG tablet, Take 1 tablet (75 mg) by mouth daily Start taking medication the day after the procedure., Disp: 90 tablet, Rfl: 1     cyanocobalamin (VITAMIN B-12) 2500 MCG SUBL sublingual tablet, Place 2,500 mcg under the tongue daily, Disp: , Rfl:      furosemide (LASIX) 40 MG tablet, Take 40 mg by mouth daily, Disp: , Rfl:      gabapentin (NEURONTIN) 100 MG capsule, Take 100 mg by mouth 2 times daily, Disp: , Rfl:      glipiZIDE (GLUCOTROL) 10 MG tablet, Take 10 mg by mouth 2 times daily (before meals), Disp: , Rfl:      ketoconazole (NIZORAL) 2 % external shampoo, Apply topically twice a week Mon and Fri., Disp: , Rfl:      liraglutide (VICTOZA) 18 MG/3ML solution, Inject 0.6 mg Subcutaneous daily, Disp: , Rfl:      losartan (COZAAR) 25 MG tablet, Take 25 mg by mouth daily, Disp: , Rfl:      mineral oil-hydrophilic petrolatum (AQUAPHOR) external ointment, Apply topically 2 times daily, Disp: , Rfl:      mineral oil-hydrophilic petrolatum (AQUAPHOR) external ointment, Apply topically 2 times daily, Disp: , Rfl:      nitroGLYcerin (NITROSTAT) 0.4 MG sublingual tablet, Place 0.4 mg under the tongue every 5 minutes as needed, Disp: , Rfl:      omeprazole (PRILOSEC) 20 MG CR capsule, Take 20 mg by mouth daily, Disp: , Rfl:      oxyCODONE (ROXICODONE) 5 MG tablet, Take 0.5 tablets (2.5 mg) by mouth  every 6 hours as needed for pain, Disp: 24 tablet, Rfl: 0     polyethylene glycol (MIRALAX) 17 g packet, Take 1 packet by mouth daily, Disp: , Rfl:      rivaroxaban ANTICOAGULANT (XARELTO) 15 MG TABS tablet, Take by mouth daily (with dinner), Disp: , Rfl:      senna-docusate (SENOKOT-S/PERICOLACE) 8.6-50 MG tablet, Take 1 tablet by mouth 2 times daily And BID PRN, Disp: , Rfl:      simvastatin (ZOCOR) 40 MG tablet, Take 40 mg by mouth At Bedtime, Disp: , Rfl:      spironolactone (ALDACTONE) 25 MG tablet, Take 25 mg by mouth daily, Disp: , Rfl:      tamsulosin (FLOMAX) 0.4 MG capsule, Take 0.4 mg by mouth daily, Disp: , Rfl:     SOCIAL HABITS:    History   Smoking Status     Former     Packs/day: 1.00     Years: 36.00     Types: Cigarettes     Start date: 6/13/1964     Quit date: 4/30/2000   Smokeless Tobacco     Never     Social History    Substance and Sexual Activity      Alcohol use: Yes        Alcohol/week: 5.0 standard drinks        Types: 1 Cans of beer per week      History   Drug Use No       FAMILY HISTORY:    Family History   Problem Relation Age of Onset     Sudden Death Mother 85.00     CABG Father      Valvular heart disease Father      Esophageal Cancer Father 58.00        cause of death     No Known Problems Son      No Known Problems Sister      Obesity Sister      Osteoarthritis Sister      No Known Problems Sister      No Known Problems Grandchild      No Known Problems Grandchild        REVIEW OF SYSTEMS:    A 12 point ROS was reviewed and except for what is listed in the HPI above, all others are negative    PE:  /60   Pulse 72   Wt Readings from Last 1 Encounters:   03/23/23 183 lb 9.6 oz (83.3 kg)     There is no height or weight on file to calculate BMI.    EXAM:  GENERAL: This is a well-developed 77 year old male who appears his stated age  EYES: Grossly normal.  MOUTH: Buccal mucosa normal   MUSCULOSKELETAL: Grossly normal and both lower extremities are intact.  Status post left  transmetatarsal amputation  HEME/LYMPH: No lymphedema  NEUROLOGIC: Focally intact, Alert and oriented x 3.   PSYCH: appropriate affect  INTEGUMENT: Large heel wounds.  On right distal foot the fifth metatarsal head has a 2 cm x 1/2 cm wound.        DIAGNOSTIC STUDIES:     Images:  US Low Ext Arterial Dop Seg Pres w/o Exercise    Result Date: 3/27/2023  Table formatting from the original result was not included. BILATERAL RESTING ANKLE-BRACHIAL INDICES (EVELYNE'S) (Date: 03/24/23) Indication: Angioplasty of left proximal peroneal artery and tibioperoneal trunk (02/07/23)/Hx of ulcerations digits 2-5 right foot and osteomyelitis right foot/left transmetatarsal amputation.   Previous: 11/3/17; 01/12/23   History: Previous Smoker, Hypertension, Diabetic, Hyperlipidemia, CAD, MI, Skin Color Change and Vascular Ulcers  Resting EVELYNE's          Right: mmHg Index     Brachial: 99  Ankle-(PT): 98 0.91 Ankle-(DP): 110 1.02          Digit: 26 0.24               Left: mmHg Index     Brachial: 108  Ankle-(PT): 113 1.05 Ankle-(DP): 77 0.71          Digit: - - Resting ankle-brachial index of 1.02 on the right. Toe Pressures of 26 mmHg and TBI of 0.24 Resting ankle-brachial index of 1.05 on the left.  VPR WAVEFORMS: The right volume plethysmography waveforms are mildly abnormal at the lower thigh level, mildly abnormal at the upper calf level and mildly abnormal at the ankle. The left volume plethysmography waveforms are mildly abnormal at the lower thigh level, mildly abnormal at the upper calf level and mildly abnormal at the ankle. Impression:  1. RIGHT LOWER EXTREMITY: EVELYNE is Normal with multiphasic waveforms with an EVELYNE of 1.02. Toe Pressures are Abnormal and impaired for wound healing with toe pressures of 26 mmHg. 2. LEFT LOWER EXTREMITY: EVELYNE is Normal with multiphasic waveforms with an EVELYNE of 1.05. Toe Pressures are Not performed status post transmetatarsal amputation Reference: Wound classification Grade EVELYNE Ankle Systolic  Pressure Toe Pressures 0 > 0.80 > 100 mmHg > 60 mmHg 1 0.6 - 0.79 70 - 100 mmHg 40 - 59 mmHg 2 0.4 - 0.59 50-70 mmHg 30 - 39 mmHg 3 < 0.39 < 50 mmHg < 30 mmHg Digit Pressures DBI Disease Category > 0.70 Normal < 0.70 Abnormal > 30 mmHg Potential wound healing < 30 mmHg Impaired wound healing Ankle Brachial Pressures EVELYNE Disease Category > 1.3  Likely vessel calcification with monophasic waveforms, non-diagnostic 0.95-1.30 Normal with multiphasic waveforms 0.50-0.95 Single level disease 0.30-0.50 Multilevel disease < 0.30 Critical limb ischema Volume Plethysmography Recording (VPR) at all levels Normal Sharp systolic peak, fast upstroke, prominent dicrotic notch in wave Mild Sharp systolic peak, fast upstroke, absent dicrotic notch in wave Moderate Flattened systolic peak, slowed upstroke, absent dicrotic notch inwave Severe amplitude wave with = upslope and down slope Occluded Flat Line     IR Lower Extremity Angiogram Left    Result Date: 3/11/2023  Vascular surgery interventional procedure note Date: 03/11/23 Procedures Performed: Ir Lower Extremity Angiogram Left  from the left superficial femoral artery level via left groin approach   Ultrasound-guided vascular access   Angioplasty of proximal peroneal artery and tibioperoneal trunk . Provider: Lois Jackson MD Assistant: Mell Watkins MD, vascular surgery fellow Indication: This a gentleman with advanced bilateral peripheral vascular disease who has undergone a left leg transmetatarsal amputation that is developed some new wounds.  Duplex suggests infrapopliteal disease.  Plan for angiogram and possible intervention to optimize wound healing.  Patient has some baseline chronic renal dysfunction and so an attempt will be made to minimize contrast exposure. Sedation: The procedure was performed with administration of intravenous conscious sedation with appropriate preoperative, intraoperative, and postoperative evaluation.  45 minutes of supervised face to face  intraservice time was provided by a radiology nurse under my direct supervision.  Versed 2 mg Fentanyl 100 mcg given. ANTIBIOTICS: None FLUOROSCOPIC TIME:   Minutes RADIATION DOSE: 49 mGy ; 3.7 Minutes ; 0 mGy CONTRAST: 25 cc Visipaque Procedure:   Ultrasound was used to evaluate the left groin.  The selected vessel was  widely patent.  It was the common femoral artery. Ultrasound was then used for real-time ultrasound guided needle entry of the common femoral artery. Permanent images were recorded and saved to the patient's medical record.   Under ultrasound guidance we advanced a J-wire through the 19-gauge needle into the SFA.  We confirmed its location with fluoroscopy.  We then exchanged in a 4 British sheath.  Selective left leg angiography was performed from the sheath level in the SFA   Findings: Widely patent superficial femoral artery without disease.  Patent popliteal artery without obvious disease.  There is single-vessel peroneal runoff with some beading and a few focal stenoses 1 of which looks almost complete for segment of less than a centimeter.  The peroneal artery is a good caliber vessel beyond this and collateralizes across the ankle to provide flow to the foot.  The posterior tibial artery is visible and reconstitutes through collaterals.  It appears to be a fairly wispy vessel but after occlusions we can see that it does provide flow through the pedal arch.  There is no evidence of anterotibial artery at all.   Given these findings we elected to treat the proximal tib peroneal trunk and peroneal artery disease.  A V18 wire was advanced straight down the popliteal artery and through the occlusions.  An 018 Timmons cross catheter was advanced with it past the occlusions.  It was used to perform an angiogram in the peroneal artery   Findings: Intraluminal location of a catheter in the peroneal artery   At this point we introduced a wire again and over advanced a 3 mm x 100 mm angioplasty balloon was  used to treat the peroneal artery and tibioperoneal trunk.  Inflation was to 10 shantel of pressure and held up for 2 minutes.  Completion angiogram was performed.  This showed widely patent flow through this area without evidence of dissection or distal embolization.  I felt that this is significantly improved in the flow to the foot.  We contemplated treating the posterior tibial artery but given that the patient has renal dysfunction and that that would be more challenging, and more importantly that we probably had improved flow enough to allow wound healing we elected not to proceed.  Sheath left in and patient given 20 use of protamine with the plan of removing the sheath once ACT below 260. Impression: Identification and successful treatment of proximal tibial peroneal trunk and peroneal artery focal but critical disease.  Posterior tibial advanced long segment disease identified which could theoretically be treated but was not given the patient's renal dysfunction and need for only marginal improvement in flow hopefully.  Will need to be seen in clinic in 2 weeks time to see how his wounds are coming along at that time we will need TCO twos and ABIs as toe pressures are not possible status post transmetatarsal amputation.  Need to be kept on dual antiplatelet therapy for at least 3 months. Lois Jackson MD [facility] Vascular Surgery    US Lower Extremity Arterial Duplex Bilateral    Result Date: 3/27/2023  Table formatting from the original result was not included. Arterial Duplex Ultrasound (Date: 03/24/23) Lower Extremity Artery Evaluation Indication: Angioplasty of left proximal peroneal artery and tibioperoneal trunk (02/07/23)/Hx of ulcerations digits 2-5 right foot and osteomyelitis right foot/left transmetatarsal amputation.   Previous: 11/3/17; 01/12/23   History: Previous Smoker, Hypertension, Diabetic, Hyperlipidemia, CAD, MI, Skin Color Change and Vascular Ulcers Technique: Duplex imaging is  performed utilizing gray-scale, two-dimensional images, and color-flow imaging. Doppler waveform analysis and spectral Doppler imaging is also performed. LOWER EXTREMITY ARTERIAL DUPLEX EXAM WITH WAVEFORMS Right Leg:(cm/s) Location: Velocities Waveforms EIA:   156  T CFA:   142  T PFA:   152  T SFA Proximal:   165  M SFA Mid:   133  M SFA Distal:   132  M Popliteal Artery:   100  M PTA:   50   M PRICE:   91  M DPA:   177  M Waveforms: T=Triphasic, M=Monophasic, B=Biphasic Left Leg:(cm/s) Location: Velocities Waveforms EIA:   167  T CFA:   173  T PFA:   126  B SFA Proximal:   161  T SFA Mid:   127  M SFA Distal:   132  M Popliteal Artery:   81  M TPT:   134 M PTA:   227   M PRICE:   57  M DPA:   109  M Waveforms: T=Triphasic, M=Monophasic, B=Biphasic Impression: Right Lower Extremity: Triphasic external iliac and common femoral waveforms just no inflow disease.  Waveforms become monophasic in the SFA suggesting important disease in this distribution although the clear stenosis is not identified.  Waveforms become profoundly blunted at the tibial vessel level suggesting significant infrapopliteal disease. Left Lower Extremity:  Triphasic external iliac and common femoral waveforms just no inflow disease.  Waveforms become monophasic in the SFA suggesting important disease in this distribution although the clear stenosis is not identified.  Waveforms become profoundly blunted at the tibial vessel level suggesting significant infrapopliteal disease.  Significantly elevated velocities seen in the posterior tibial artery may represent visualized stenosis Reference: Category Normal 1-19% 20-49% 50-99% Occluded PSV <160 cm/sec without spectral broadening <160 cm/sec with spectral broadening Increased Increased Absent Flow Ratio N/A N/A < 2.0 >2.0 N/A Post-Stenotic Turbulence No No No Yes N/A       I personally reviewed the images and my interpretation is that his TcPO2's are extremely low at 3 and 30 mmHg.  ABIs are normal  bilaterally..    LABS:      Sodium   Date Value Ref Range Status   03/10/2023 137 136 - 145 mmol/L Final   01/24/2023 139 136 - 145 mmol/L Final   01/17/2023 135 (L) 136 - 145 mmol/L Final   06/30/2018 139 133 - 144 mmol/L Final   06/29/2018 139 133 - 144 mmol/L Final   06/28/2018 136 133 - 144 mmol/L Final     Urea Nitrogen   Date Value Ref Range Status   03/10/2023 33.5 (H) 8.0 - 23.0 mg/dL Final   01/24/2023 22 8 - 28 mg/dL Final   01/17/2023 19.5 8.0 - 23.0 mg/dL Final   01/10/2023 20.4 8.0 - 23.0 mg/dL Final   05/24/2022 21 8 - 28 mg/dL Final   10/21/2021 16 8 - 28 mg/dL Final   06/30/2018 22 7 - 30 mg/dL Final   06/29/2018 25 7 - 30 mg/dL Final   06/28/2018 22 7 - 30 mg/dL Final     Hemoglobin   Date Value Ref Range Status   03/10/2023 11.3 (L) 13.3 - 17.7 g/dL Final   01/24/2023 10.8 (L) 13.3 - 17.7 g/dL Final   01/17/2023 11.4 (L) 13.3 - 17.7 g/dL Final   06/30/2018 10.5 (L) 13.3 - 17.7 g/dL Final   06/29/2018 10.5 (L) 13.3 - 17.7 g/dL Final   06/28/2018 10.5 (L) 13.3 - 17.7 g/dL Final     Platelet Count   Date Value Ref Range Status   03/10/2023 269 150 - 450 10e3/uL Final   01/24/2023 174 150 - 450 10e3/uL Final   01/17/2023 164 150 - 450 10e3/uL Final   06/30/2018 105 (L) 150 - 450 10e9/L Final   06/29/2018 95 (L) 150 - 450 10e9/L Final   06/28/2018 89 (L) 150 - 450 10e9/L Final     BNP   Date Value Ref Range Status   08/17/2019 181 (H) 0 - 74 pg/mL Final   11/07/2018 161 (H) 0 - 72 pg/mL Final   11/05/2018 148 (H) 0 - 72 pg/mL Final     INR   Date Value Ref Range Status   01/24/2023 1.23 (H) 0.85 - 1.15 Final   08/18/2019 1.37 (H) 0.90 - 1.10 Final   08/18/2019 1.28 (H) 0.90 - 1.10 Final   06/30/2018 1.55 (H) 0.86 - 1.14 Final   06/29/2018 1.55 (H) 0.86 - 1.14 Final   06/28/2018 1.72 (H) 0.86 - 1.14 Final         Lois Jackson MD, MD  VASCULAR SURGERY

## 2023-03-27 NOTE — NURSING NOTE
[facility]        Patient is here for a  follow up  to discuss LLE Angio    Pt is currently taking Statin, Plavix and Xarelto.    /60   Pulse 72     The provider has been notified that the patient has no concerns.     Questions patient would like addressed today are: N/A.    Refills are needed: No    Has homecare services and agency name:  Angie Julio LPN

## 2023-03-28 ENCOUNTER — TELEPHONE (OUTPATIENT)
Dept: VASCULAR SURGERY | Facility: CLINIC | Age: 78
End: 2023-03-28
Payer: MEDICARE

## 2023-03-28 DIAGNOSIS — L89.623 PRESSURE ULCER OF LEFT HEEL, STAGE 3 (H): Primary | ICD-10-CM

## 2023-03-28 DIAGNOSIS — L89.613 PRESSURE ULCER OF RIGHT HEEL, STAGE 3 (H): ICD-10-CM

## 2023-03-28 NOTE — TELEPHONE ENCOUNTER
[facility] states they need an order faxed over that states patient is non weight bearing  PH: [phone]  FAX: [phone]

## 2023-03-28 NOTE — TELEPHONE ENCOUNTER
Writer contacted [facility], she stated nursing had questions and writer was transferred. Left message for [facility] nurse manager to call back with questions.

## 2023-03-28 NOTE — TELEPHONE ENCOUNTER
Caller: ANDREE Carbajal    Provider: MD Lois Jackson    Detailed reason for call: Patient saw Dr Jackson yesterday. She asked if there were any changes to his weight bear status. She sees the note to avoid weight on the heels but it would be difficult for patient to walk on forefoot.  Please call with specific WB instructions.    Best phone number to contact: [phone]    Best time to contact: any    Ok to leave a detailed message: Yes

## 2023-04-05 ENCOUNTER — DOCUMENTATION ONLY (OUTPATIENT)
Dept: VASCULAR SURGERY | Facility: CLINIC | Age: 78
End: 2023-04-05

## 2023-04-05 ENCOUNTER — OFFICE VISIT (OUTPATIENT)
Dept: VASCULAR SURGERY | Facility: CLINIC | Age: 78
End: 2023-04-05
Payer: MEDICARE

## 2023-04-05 ENCOUNTER — PREP FOR PROCEDURE (OUTPATIENT)
Dept: VASCULAR SURGERY | Facility: CLINIC | Age: 78
End: 2023-04-05
Payer: MEDICARE

## 2023-04-05 VITALS — TEMPERATURE: 98.1 F | SYSTOLIC BLOOD PRESSURE: 110 MMHG | DIASTOLIC BLOOD PRESSURE: 60 MMHG | RESPIRATION RATE: 18 BRPM

## 2023-04-05 DIAGNOSIS — L89.613 PRESSURE ULCER OF RIGHT HEEL, STAGE 3 (H): ICD-10-CM

## 2023-04-05 DIAGNOSIS — L89.623 PRESSURE ULCER OF LEFT HEEL, STAGE 3 (H): Primary | ICD-10-CM

## 2023-04-05 DIAGNOSIS — M86.9 OSTEOMYELITIS OF ANKLE AND FOOT (H): Primary | ICD-10-CM

## 2023-04-05 DIAGNOSIS — M86.9 OSTEOMYELITIS OF ANKLE AND FOOT (H): ICD-10-CM

## 2023-04-05 PROCEDURE — 97597 DBRDMT OPN WND 1ST 20 CM/<: CPT | Performed by: PODIATRIST

## 2023-04-05 PROCEDURE — 11043 DBRDMT MUSC&/FSCA 1ST 20/<: CPT | Performed by: PODIATRIST

## 2023-04-05 PROCEDURE — 11045 DBRDMT SUBQ TISS EACH ADDL: CPT | Performed by: PODIATRIST

## 2023-04-05 PROCEDURE — 11042 DBRDMT SUBQ TIS 1ST 20SQCM/<: CPT | Performed by: PODIATRIST

## 2023-04-05 ASSESSMENT — PAIN SCALES - GENERAL: PAINLEVEL: WORST PAIN (10)

## 2023-04-05 NOTE — PATIENT INSTRUCTIONS
Order PRAFO boot to be worn on right foot at all times.    RIGHT FOOT WOUND: Cleanse with normal saline and cover with dry gauze dressing, and change daily until surgery.    LEFT FOOT WOUND: Cleanse with normal saline and apply medihoney to wound. Cover with dry gauze, roll gauze, and tape. No tape on skin. Change every other day.        Ever,    Your surgery with [facility] has been scheduled. Please read thoroughly and follow instructions.     Procedure:   I&D right heel, Transmetatarsal amputation right foot  Procedure Date :   04/14/2023  *A surgery nurse will call you 1-2 days before surgery to inform you of the time of arrival for surgery.  Surgeon:   Dr. Miguelangel Spears  Admission Type:   Outpatient  Procedure Location:   [facility]:  [address] [place] [address] (phone: [phone], Fax: [phone])    Home Rapid COVID Test: To be done 1-2 days before surgery if done at [facility]. See below.          Preparation Instructions to complete:    []  You will need a Pre-op Physical within 30 days before surgery with your primary care provider. This exam is required by the anesthesiologist to ensure a safe surgical experience.    Failure  to obtain your pre-op physical will lead to cancellation of your surgery  Call them right away to schedule this. Please ensure your Preoperative Physical is faxed to the Hospital (numbers listed above)    [] Preoperative Medication Instructions  We would like you to stop your anticoagulation medications 3-5 days before surgery HOWEVER contact your prescribing provider for instructions before discontinuing any medications. (Examples: Coumadin, Plavix, Xarelto, Eliquis, Pradaxa, Effient, Brilinta) If you are on Coumadin, we would like the goal INR ? 1.2.  IT IS OK TO STAY ON ASPIRIN PRIOR TO SURGERY.   Hold Ibuprofen, Herbal Supplements and Vitamin E 7 days before  Stop Cialis, Levitra and Viagra 2-3  days before surgery    [] Fasting Requirements  Nothing to eat for 8 hours before surgery unless instructed differently by the surgery nurse.  You may have clear liquids up to two hours before your arrival time (coffee, tea, water, or Gatorade. No cream or milk)  If your primary care provider has instructed you to continue oral medications, you may take them on the morning of surgery with a small sip of water.    No alcohol or smoking after 12:00am the day of surgery    []  Home Rapid COVID-19 test is required only at [facility]  You will need a home rapid test done 1-2 days before surgery. Please take a photo of result on your phone and show to staff.   If you are experiencing COVID symptoms or have tested positive for COVID-19 within 14 days of procedure date, reach out to the care team to reschedule please.   If your surgery is located at the hospital, you will not need a COVID test.     [] Contact your insurance regarding coverage  If you would like a Good Ailin Estimate for your upcoming procedure at [facility] Location, contact Cost of Care Estimates   Advocates are available Monday through Friday 8am - 5pm   [phone]  You may also submit a request online through your [employer] account.  For all self-pay, estimate, or anesthesia billing questions at [facility], the contact information is below:  Who to contact: Leslee SOTO  [facility] number: [phone]  Prepay number: [phone]    [] DO NOT BRING FMLA WITH TO SURGERY.  These should be sent to the provider's office by fax to [phone].     [] Day of Surgery  Medications - Take as indicated with sips of water.   Wear comfortable loose fitting clothes. Wear your glasses-Not contacts. Do not wear jewelry and remove body piercing's. Surgery may be cancelled if they are not removed.   You may have 1 family member wait in the lobby during your surgery. Visitor restrictions are subject to change. Please  verify with the surgery nurse when they call.   If same day surgery-Have a someone come with you to surgery that can help you understand the surgeon's instructions, drive you home and stay with you overnight the first night.    [] If the community sees a new COVID-19 surge, your procedure may need to be postponed. We will contact you if this happens.    [] You will receive a call from a surgery nurse 1-3 days prior to surgery. They will go over more details with you.             ** AFTER SURGERY INSTRUCTIONS **      [] You are to remain NON WEIGHT BEARING on your right foot NON WEIGHT BEARING MEANS NO PRESSURE ON YOUR FOOT OR HEEL AT ANY TIME FOR ANY REASON!    [] Prior to surgery you should already have a PRAFO BOOT which you will need to WEAR THIS AT ALL TIMES EVEN WHILE IN BED Please bring this with you on the day of surgery.    [] You should already have a A WHEELCHAIR to help you ambulate after surgery. Please also bring this with you on the day of surgery.    [] During surgery Dr. Spears will apply a gauze dressing to your right foot. This will remain intact until you are seen in clinic for follow up    [] It is NOT OKAY to get your surgical site wet while bathing, you will need to purchase a cast cover to protect your foot from getting wet. You can purchase this at [facility] or your local pharmacy.    [] It is important that you elevate your affected foot after surgery. Proper elevation is raising your foot above your waist. The fluid in your lower extremities needs to get back to your heart so it can get pumped to your kidneys and expelled through urination. So if you notice you have to go to the bathroom more frequently when you are elevating your leg this is a good sign that it is working.     [] It is important that you increase your protein intake after surgery. Protein is essential for wound healing. We recommend you take a protein supplement twice per day. This is in addition to your normal  diet. Examples of protein supplements are Ensure, Boost, Glucerna (if you are diabetic), or protein powder. You can purchase these at your local retailer or grocery store.       Notify our office right away, if you have any changes in your health status, or if you develop a cold, flu, diarrhea, infection, fever or sore throat before your scheduled surgery date. We can be reached at [phone]   Monday-Friday 8 am-4:30 pm if you have any questions.     Thank you for trusting us with your care!

## 2023-04-05 NOTE — PROGRESS NOTES
Surgery Scheduled    Pt given surgery instructions while in clinic.    Surgery/Procedure: Transmetatarsal amputation right foot    CPT: 90866     Equipment: pulse lavage. Sagittal saw.    Location: [facility]:  [address] [place] [address] (phone: [phone], Fax: [phone])    Date: 4/14/23    Time: 8:30AM     Admission Type: Outpatient    Surgeon: Dr. Spears    OR Confirmed & :  Yes with Valentina on 4/5/2023    Entered on provider calendar:  Yes    Post Op:   4/21/2023 10:40 AM (Arrive by 10:25 AM) [facility]   5/5/2023 10:40 AM (Arrive by 10:25 AM) Miguelangel Spears DPM [facility]         Wound Vac Needed: tbd    Home Care Needed: tbd    Blood Thinners Addressed:  Yes - being addressed by Mulu in clinic.    Stress Test Clearance: NO

## 2023-04-05 NOTE — PROGRESS NOTES
FOOT AND ANKLE SURGERY/PODIATRY Progress Note      ASSESSMENT:   Diabetic Ulcerations digits 2-5 right foot  Stage 3 Pressure Ulcerations bilateral heels   Ulceration left leg  Osteomyelitis right foot  DM2  PAD        TREATMENT:  -I again reviewed the surgical plan for transmetatarsal amputation on the right foot with Achilles tendon lengthening, debridement of heel ulcer.  Surgical recommendations were in the context of osteomyelitis on MRI from January and also on exam today ulceration plantar fifth MPJ probes to bone with nonviable tissue.  We discussed the surgical procedure including importance of strict nonweightbearing postoperatively with elevation of the surgical limb above his waist at all times.  Risks of surgery include but not limited to dehiscence of the surgical site, additional foot amputation or loss of limb.  All questions invited and answered.  Patient consents to surgery.    -After discussion of risk factors and consent obtained 2% Lidocaine HCL jelly was applied, under clean conditions, the 4th digit right foot and 5th MPJ right ulceration(s) were debrided using .bone.  Devitalized and nonviable tissue, along with any fibrin and slough, was removed to improve granulation tissue formation, stimulate wound healing, decrease overall bacteria load, disrupt biofilm formation and decrease edge senescence. Wound drainage was scant No. Total excisional debridement was 11 sq cm bone with a depth of 0.5 cm.   Ulcers were improved afterwards and .  Measures were as noted on the flow sheet. A gauze dressing was applied. He will continue to apply a gauze dressing qday.     -After discussion of risk factors and consent obtained 2% Lidocaine HCL jelly was applied, under clean conditions, the bilateral heel ulceration(s) were debrided using .into the subcutaneous tissue.  Devitalized and nonviable tissue, along with any fibrin and slough, was removed to improve granulation tissue formation, stimulate  wound healing, decrease overall bacteria load, disrupt biofilm formation and decrease edge senescence. Wound drainage was scant No. Total excisional debridement was 98.8 sq cm into the subcutaneous tissue with a depth of 0.2 cm.   Ulcers were improved afterwards and .  Measures were as noted on the flow sheet. A gauze dressing was applied. He will continue to apply a gauze dressing qday.     -After discussion of risk factors and consent obtained 2% Lidocaine HCL jelly was applied, under clean conditions, the 3rd digit right foot, bilateral heel and left leg ulceration(s) were debrided using .from the epidermis/dermis area.  Devitalized and nonviable tissue, along with any fibrin and slough, was removed to improve granulation tissue formation, stimulate wound healing, decrease overall bacteria load, disrupt biofilm formation and decrease edge senescence. Wound drainage was scant No. Total excisional debridement was 7 sq cm from the epidermis/dermis area with a depth of 0.1 cm.   Ulcers were improved afterwards and .  Measures were as noted on the flow sheet. A gauze dressing was applied. He will continue to apply a gauze dressing qday.    -I have asked my office to coordinate surgery at [facility].  Patient is currently at TCU and will plan to return to TCU postoperatively.  Will check with vascular surgery regarding current blood thinners.    Miguelangel Spears DPM  [facility]      HPI: Ever Crane was seen again today for bilateral foot ulcerations.  Patient has completed angiogram with Dr. Geiger and has been cleared for surgical intervention at this time on the right foot.    Past Medical History:   Diagnosis Date     A-fib (H)      MESHA (acute kidney injury) (H)      Atopic keratoconjunctivitis      Atrial fibrillation (H)     Brian Moe: 8/2011 Cardioversion; CHADS2 VASC = 5; he is on warfarin and sotalol      Atrial flutter (H)      Mcgarry's esophagus 1/1/2012     per note of Dr. Tavo Mike of [facility]     Bilateral lower leg cellulitis 8/31/2018     Candidiasis of perineum 1/3/2018     Carotid stenosis, asymptomatic, right      CHF (congestive heart failure) (H)      Cholecystitis, acute 8/18/2019     Closed nondisplaced intertrochanteric fracture of left femur with routine healing, subsequent encounter 5/18/2022     Coronary artery disease due to lipid rich plaque 2000    CABG x2     Diabetes (H)      Diabetic ulcer of both feet (H) 10/31/2017     Dyslexia      Dyslipidemia, goal LDL below 70 2000     Epistaxis      Essential hypertension      Gangrene of left foot (H)      GERD (gastroesophageal reflux disease)      HLD (hyperlipidemia)      HTN (hypertension)      Hyponatremia      MRSA (methicillin resistant Staphylococcus aureus)      Neuropathy      Non-STEMI (non-ST elevated myocardial infarction) (H)      Other atopic dermatitis      Peripheral vascular disease (H)      Pneumonia 6/26/2018     Type 2 diabetes mellitus, without long-term current use of insulin (H)      Unable to function independently 11/13/2017       Past Surgical History:   Procedure Laterality Date     AMPUTATE FOOT Left 11/05/2017    Procedure: LEFT TRANSMETATARSAL AMPUTATION;  Surgeon: Ever Wick MD;  Location: [facility] OR;  Service:      BYPASS GRAFT ARTERY CORONARY N/A 03/06/2000    SVG to OM1, SVG to PDA     BYPASS GRAFT ARTERY CORONARY N/A 10/04/2018    redo CABG due to graft failure     CABG MEASURES GRP       CARDIOVERSION  08/25/2011     CV CORONARY ANGIOGRAM N/A 10/01/2018    Procedure: Coronary Angiogram;  Surgeon: Miki Mac MD;  Location: [facility] Lab;  Service:      INGUINAL HERNIA REPAIR Bilateral 1969    and 1979     IR EXTREMITY ANGIOGRAM BILATERAL  11/3/2017     IR LOWER EXTREMITY ANGIOGRAM LEFT  3/10/2023     IR LOWER EXTREMITY ANGIOGRAM RIGHT  1/24/2023     LAPAROSCOPIC CHOLECYSTECTOMY N/A 08/18/2019    Procedure: CHOLECYSTECTOMY, LAPAROSCOPIC;  Surgeon:  Stewart Fountain MD;  Location: [facility];  Service: General       Allergies   Allergen Reactions     Doxycycline Rash     Latex Rash     Penicillin V Rash     Reaction occurred as a child. Patient tolerated Zosyn 6/2018, Cefazolin 10/2018, and has also tolerated Augmentin.     Penicillins Rash     Reaction occurred as a child. Patient tolerated Zosyn 6/2018, Cefazolin 10/2018, and has also tolerated Augmentin.         Current Outpatient Medications:      acetaminophen (TYLENOL) 325 MG tablet, 1000 mg tid scheduled and 500 mg bid prn, Disp: , Rfl:      albuterol (PROAIR HFA/PROVENTIL HFA/VENTOLIN HFA) 108 (90 Base) MCG/ACT inhaler, Inhale 2 puffs into the lungs 2 times daily And q4h PRN, Disp: , Rfl:      celecoxib (CELEBREX) 100 MG capsule, Take 100 mg by mouth daily as needed, Disp: , Rfl:      cetirizine (ZYRTEC) 10 MG tablet, Take 10 mg by mouth daily, Disp: , Rfl:      clopidogrel (PLAVIX) 75 MG tablet, Take 1 tablet (75 mg) by mouth daily Start taking medication the day after the procedure., Disp: 90 tablet, Rfl: 1     cyanocobalamin (VITAMIN B-12) 2500 MCG SUBL sublingual tablet, Place 2,500 mcg under the tongue daily, Disp: , Rfl:      furosemide (LASIX) 40 MG tablet, Take 40 mg by mouth daily, Disp: , Rfl:      gabapentin (NEURONTIN) 100 MG capsule, Take 100 mg by mouth 2 times daily, Disp: , Rfl:      glipiZIDE (GLUCOTROL) 10 MG tablet, Take 10 mg by mouth 2 times daily (before meals), Disp: , Rfl:      ketoconazole (NIZORAL) 2 % external shampoo, Apply topically twice a week Mon and Fri., Disp: , Rfl:      liraglutide (VICTOZA) 18 MG/3ML solution, Inject 0.6 mg Subcutaneous daily, Disp: , Rfl:      losartan (COZAAR) 25 MG tablet, Take 25 mg by mouth daily, Disp: , Rfl:      mineral oil-hydrophilic petrolatum (AQUAPHOR) external ointment, Apply topically 2 times daily, Disp: , Rfl:      mineral oil-hydrophilic petrolatum (AQUAPHOR) external ointment, Apply topically 2 times daily, Disp: , Rfl:       nitroGLYcerin (NITROSTAT) 0.4 MG sublingual tablet, Place 0.4 mg under the tongue every 5 minutes as needed, Disp: , Rfl:      omeprazole (PRILOSEC) 20 MG CR capsule, Take 20 mg by mouth daily, Disp: , Rfl:      oxyCODONE (ROXICODONE) 5 MG tablet, Take 0.5 tablets (2.5 mg) by mouth every 6 hours as needed for pain, Disp: 24 tablet, Rfl: 0     polyethylene glycol (MIRALAX) 17 g packet, Take 1 packet by mouth daily, Disp: , Rfl:      rivaroxaban ANTICOAGULANT (XARELTO) 15 MG TABS tablet, Take by mouth daily (with dinner), Disp: , Rfl:      senna-docusate (SENOKOT-S/PERICOLACE) 8.6-50 MG tablet, Take 1 tablet by mouth 2 times daily And BID PRN, Disp: , Rfl:      simvastatin (ZOCOR) 40 MG tablet, Take 40 mg by mouth At Bedtime, Disp: , Rfl:      spironolactone (ALDACTONE) 25 MG tablet, Take 25 mg by mouth daily, Disp: , Rfl:      tamsulosin (FLOMAX) 0.4 MG capsule, Take 0.4 mg by mouth daily, Disp: , Rfl:     Review of Systems - 10 point Review of Systems is negative except for bilateral foot ulcers which is noted in HPI.      OBJECTIVE:  /60   Temp 98.1  F (36.7  C) (Oral)   Resp 18   General appearance: Patient is alert and fully cooperative with history & exam.  No sign of distress is noted during the visit.    Vascular: Dorsalis pedis non-palpableBilateral.  Dermatologic:    VASC Wound right foot 2nd toe (Active)   Pre Size Length 3 01/13/23 1256   Pre Size Width 2 01/13/23 1256   Pre Size Depth 0.1 01/13/23 1256   Pre Total Sq cm 6 01/13/23 1256       VASC Wound right foot 3rd toe (Active)   Pre Size Length 1.6 01/13/23 1256   Pre Size Width 1.2 01/13/23 1256   Pre Size Depth 0.1 01/05/23 1100   Pre Total Sq cm 1.92 01/13/23 1256       VASC Wound right foot btween 3rd and 4th toe (Active)   Pre Size Length 0.5 01/13/23 1256   Pre Size Width 0.7 01/13/23 1256   Pre Size Depth 0.1 01/13/23 1256   Pre Total Sq cm 0.35 01/13/23 1256       VASC Wound right foot 4th toe (Active)   Pre Size Length 0.8  01/13/23 1256   Pre Size Width 0.3 01/13/23 1256   Pre Size Depth 0.1 01/13/23 1256   Pre Total Sq cm 4 01/05/23 1100       VASC Wound right foot 5th toe (Active)   Pre Size Length 1.7 01/13/23 1256   Pre Size Width 1.3 01/13/23 1256   Pre Size Depth 0.2 01/13/23 1256   Pre Total Sq cm 2.21 01/13/23 1256       VASC Wound Right lateral foot (Active)   Pre Size Length 1.8 01/13/23 1256   Pre Size Width 2.2 01/13/23 1256   Pre Size Depth 0.1 01/13/23 1256   Pre Total Sq cm 3.96 01/13/23 1256       VASC Wound Right foot between 4th and 5th toe (Active)   Pre Size Length 1.4 01/13/23 1256   Pre Size Width 4 01/13/23 1256   Pre Size Depth 0.5 01/13/23 1256   Pre Total Sq cm 5.6 01/13/23 1256       VASC Wound Left lef distal (Active)   Pre Size Length 0.7 01/13/23 1256   Pre Size Width 0.6 01/13/23 1256   Pre Size Depth 0.2 01/13/23 1256   Pre Total Sq cm 0.42 01/13/23 1256       VASC Wound left leg proximal (Active)   Pre Size Length 7.7 01/13/23 1256   Pre Size Width 2.8 01/13/23 1256   Pre Size Depth 0.2 01/13/23 1256   Pre Total Sq cm 21.56 01/13/23 1256       VASC Wound Right heel (Active)   Pre Size Length 7 04/05/23 1000   Pre Size Width 7.4 04/05/23 1000   Pre Size Depth 0.2 04/05/23 1000   Pre Total Sq cm 51.8 04/05/23 1000   Post Size Length 9 01/13/23 1300   Post Size Width 9 01/13/23 1300   Post Size Depth 0.1 01/13/23 1300   Post Total Sq cm 81 01/13/23 1300   Description eschar 04/05/23 1000       VASC Wound Left heel (Active)   Pre Size Length 6 04/05/23 1000   Pre Size Width 6.5 04/05/23 1000   Pre Size Depth 0.2 04/05/23 1000   Pre Total Sq cm 39 04/05/23 1000   Post Size Length 7 01/13/23 1300   Post Size Width 6 01/13/23 1300   Post Size Depth 0.1 01/13/23 1300   Post Total Sq cm 42 01/13/23 1300       VASC Wound (Active)       VASC Wound Right MPJ (Active)   Pre Size Length 3 04/05/23 1100   Pre Size Width 2.6 04/05/23 1100   Pre Size Depth 1 04/05/23 1100   Pre Total Sq cm 7.8 04/05/23 1100       VASC  Wound Right 2nd toe (Active)   Pre Size Length 1 04/05/23 1100   Pre Size Width 1 04/05/23 1100   Pre Size Depth 0.2 04/05/23 1100   Pre Total Sq cm 1 04/05/23 1100       Incision/Surgical Site 03/10/23 Anterior;Left Pelvis (Active)   Incision Assessment WDL 03/10/23 1547   Closure Adhesive strip(s) 03/10/23 1421   Incision Drainage Amount None 03/10/23 1547   Dressing Intervention Clean, dry, intact 03/10/23 1547   Ulcerations digits 2,3 right foot have fibrotic tissue. Non-viable tissue along lateral 4th digit right foot with exposed bone. Necrotic tissue along the lateral 5th digit and 5th MPJ right foot.   Ulceration plantar fifth MPJ right foot probes to bone.  Granular tissue left heel with minimal granular tissue right heel with large stable eschar.  No erythema bilateral.  Neurologic: Diminished to light touch Bilateral.  Musculoskeletal: Contracted digits noted right.     Imaging:     US TCO2 Bilateral    Result Date: 3/27/2023  Table formatting from the original result was not included. BILATERAL RESTING ANKLE-BRACHIAL INDICES (EVELYNE'S) (Date: 03/27/23) Indication: Angioplasty of left proximal peroneal artery and tibioperoneal trunk (02/07/23)/Hx of ulcerations digits 2-5 right foot and osteomyelitis right foot/left transmetatarsal amputation.   Previous: 01/12/23-EVELYNE   History: Previous Smoker, Hypertension, Diabetic, Hyperlipidemia, CAD, MI, Skin Color Change and Vascular Ulcers  Resting EVELYNE's          Right: mmHg Index Tcp02-Foot: 3                Left: mmHg Index Tcp02-Foot: 13  Tcp02 Pressures of 3 mmHg on the right. Tcp02 Pressures of 13 mmHg on the left. Impression:  1. RIGHT LOWER EXTREMITY: Tcp02 Pressures are Abnormal and impaired for wound healing with Tcp02 pressures of 3 mmHg. 2. LEFT LOWER EXTREMITY: Tcp02 Pressures are Abnormal and impaired for wound healing with Tcp02 pressures of 13 mmHg. Reference: Wound classification Grade EVELYNE Ankle Systolic Pressure Toe Pressures 0 > 0.80 > 100 mmHg > 60  mmHg 1 0.6 - 0.79 70 - 100 mmHg 40 - 59 mmHg 2 0.4 - 0.59 50-70 mmHg 30 - 39 mmHg 3 < 0.39 < 50 mmHg < 30 mmHg Digit Pressures DBI Disease Category > 0.70 Normal < 0.70 Abnormal > 30 mmHg Potential wound healing < 30 mmHg Impaired wound healing Ankle Brachial Pressures EVELYNE Disease Category > 1.3  Likely vessel calcification with monophasic waveforms, non-diagnostic 0.95-1.30 Normal with multiphasic waveforms 0.50-0.95 Single level disease 0.30-0.50 Multilevel disease < 0.30 Critical limb ischema Volume Plethysmography Recording (VPR) at all levels Normal Sharp systolic peak, fast upstroke, prominent dicrotic notch in wave Mild Sharp systolic peak, fast upstroke, absent dicrotic notch in wave Moderate Flattened systolic peak, slowed upstroke, absent dicrotic notch inwave Severe amplitude wave with = upslope and down slope Occluded Flat Line TCP02 Criteria Normal Values 50-80 WO/Supplemental Oxygen Impaired Healing <30 WO/Supplemental Oxygen     US Lower Extremity Arterial Duplex Bilateral    Result Date: 3/27/2023  Table formatting from the original result was not included. Arterial Duplex Ultrasound (Date: 03/24/23) Lower Extremity Artery Evaluation Indication: Angioplasty of left proximal peroneal artery and tibioperoneal trunk (02/07/23)/Hx of ulcerations digits 2-5 right foot and osteomyelitis right foot/left transmetatarsal amputation.   Previous: 11/3/17; 01/12/23   History: Previous Smoker, Hypertension, Diabetic, Hyperlipidemia, CAD, MI, Skin Color Change and Vascular Ulcers Technique: Duplex imaging is performed utilizing gray-scale, two-dimensional images, and color-flow imaging. Doppler waveform analysis and spectral Doppler imaging is also performed. LOWER EXTREMITY ARTERIAL DUPLEX EXAM WITH WAVEFORMS Right Leg:(cm/s) Location: Velocities Waveforms EIA:   156  T CFA:   142  T PFA:   152  T SFA Proximal:   165  M SFA Mid:   133  M SFA Distal:   132  M Popliteal Artery:   100  M PTA:   50   M PRICE:   91  M  DPA:   177  M Waveforms: T=Triphasic, M=Monophasic, B=Biphasic Left Leg:(cm/s) Location: Velocities Waveforms EIA:   167  T CFA:   173  T PFA:   126  B SFA Proximal:   161  T SFA Mid:   127  M SFA Distal:   132  M Popliteal Artery:   81  M TPT:   134 M PTA:   227   M PRICE:   57  M DPA:   109  M Waveforms: T=Triphasic, M=Monophasic, B=Biphasic Impression: Right Lower Extremity: Triphasic external iliac and common femoral waveforms just no inflow disease.  Waveforms become monophasic in the SFA suggesting important disease in this distribution although the clear stenosis is not identified.  Waveforms become profoundly blunted at the tibial vessel level suggesting significant infrapopliteal disease. Left Lower Extremity:  Triphasic external iliac and common femoral waveforms just no inflow disease.  Waveforms become monophasic in the SFA suggesting important disease in this distribution although the clear stenosis is not identified.  Waveforms become profoundly blunted at the tibial vessel level suggesting significant infrapopliteal disease.  Significantly elevated velocities seen in the posterior tibial artery may represent visualized stenosis Reference: Category Normal 1-19% 20-49% 50-99% Occluded PSV <160 cm/sec without spectral broadening <160 cm/sec with spectral broadening Increased Increased Absent Flow Ratio N/A N/A < 2.0 >2.0 N/A Post-Stenotic Turbulence No No No Yes N/A     US Low Ext Arterial Dop Seg Pres w/o Exercise    Result Date: 3/27/2023  Table formatting from the original result was not included. BILATERAL RESTING ANKLE-BRACHIAL INDICES (EVELYNE'S) (Date: 03/24/23) Indication: Angioplasty of left proximal peroneal artery and tibioperoneal trunk (02/07/23)/Hx of ulcerations digits 2-5 right foot and osteomyelitis right foot/left transmetatarsal amputation.   Previous: 11/3/17; 01/12/23   History: Previous Smoker, Hypertension, Diabetic, Hyperlipidemia, CAD, MI, Skin Color Change and Vascular Ulcers  Resting  EVELYNE's          Right: mmHg Index     Brachial: 99  Ankle-(PT): 98 0.91 Ankle-(DP): 110 1.02          Digit: 26 0.24               Left: mmHg Index     Brachial: 108  Ankle-(PT): 113 1.05 Ankle-(DP): 77 0.71          Digit: - - Resting ankle-brachial index of 1.02 on the right. Toe Pressures of 26 mmHg and TBI of 0.24 Resting ankle-brachial index of 1.05 on the left.  VPR WAVEFORMS: The right volume plethysmography waveforms are mildly abnormal at the lower thigh level, mildly abnormal at the upper calf level and mildly abnormal at the ankle. The left volume plethysmography waveforms are mildly abnormal at the lower thigh level, mildly abnormal at the upper calf level and mildly abnormal at the ankle. Impression:  1. RIGHT LOWER EXTREMITY: EVELYNE is Normal with multiphasic waveforms with an EVELYNE of 1.02. Toe Pressures are Abnormal and impaired for wound healing with toe pressures of 26 mmHg. 2. LEFT LOWER EXTREMITY: EVELYNE is Normal with multiphasic waveforms with an EVELYNE of 1.05. Toe Pressures are Not performed status post transmetatarsal amputation Reference: Wound classification Grade EVELYNE Ankle Systolic Pressure Toe Pressures 0 > 0.80 > 100 mmHg > 60 mmHg 1 0.6 - 0.79 70 - 100 mmHg 40 - 59 mmHg 2 0.4 - 0.59 50-70 mmHg 30 - 39 mmHg 3 < 0.39 < 50 mmHg < 30 mmHg Digit Pressures DBI Disease Category > 0.70 Normal < 0.70 Abnormal > 30 mmHg Potential wound healing < 30 mmHg Impaired wound healing Ankle Brachial Pressures EVELYNE Disease Category > 1.3  Likely vessel calcification with monophasic waveforms, non-diagnostic 0.95-1.30 Normal with multiphasic waveforms 0.50-0.95 Single level disease 0.30-0.50 Multilevel disease < 0.30 Critical limb ischema Volume Plethysmography Recording (VPR) at all levels Normal Sharp systolic peak, fast upstroke, prominent dicrotic notch in wave Mild Sharp systolic peak, fast upstroke, absent dicrotic notch in wave Moderate Flattened systolic peak, slowed upstroke, absent dicrotic notch inwave  Severe amplitude wave with = upslope and down slope Occluded Flat Line     IR Lower Extremity Angiogram Left    Result Date: 3/11/2023  Vascular surgery interventional procedure note Date: 03/11/23 Procedures Performed: Ir Lower Extremity Angiogram Left  from the left superficial femoral artery level via left groin approach   Ultrasound-guided vascular access   Angioplasty of proximal peroneal artery and tibioperoneal trunk . Provider: Lois Jackson MD Assistant: Mell Watkins MD, vascular surgery fellow Indication: This a gentleman with advanced bilateral peripheral vascular disease who has undergone a left leg transmetatarsal amputation that is developed some new wounds.  Duplex suggests infrapopliteal disease.  Plan for angiogram and possible intervention to optimize wound healing.  Patient has some baseline chronic renal dysfunction and so an attempt will be made to minimize contrast exposure. Sedation: The procedure was performed with administration of intravenous conscious sedation with appropriate preoperative, intraoperative, and postoperative evaluation.  45 minutes of supervised face to face intraservice time was provided by a radiology nurse under my direct supervision.  Versed 2 mg Fentanyl 100 mcg given. ANTIBIOTICS: None FLUOROSCOPIC TIME:   Minutes RADIATION DOSE: 49 mGy ; 3.7 Minutes ; 0 mGy CONTRAST: 25 cc Visipaque Procedure:   Ultrasound was used to evaluate the left groin.  The selected vessel was  widely patent.  It was the common femoral artery. Ultrasound was then used for real-time ultrasound guided needle entry of the common femoral artery. Permanent images were recorded and saved to the patient's medical record.   Under ultrasound guidance we advanced a J-wire through the 19-gauge needle into the SFA.  We confirmed its location with fluoroscopy.  We then exchanged in a 4 Faroese sheath.  Selective left leg angiography was performed from the sheath level in the SFA   Findings: Widely patent  superficial femoral artery without disease.  Patent popliteal artery without obvious disease.  There is single-vessel peroneal runoff with some beading and a few focal stenoses 1 of which looks almost complete for segment of less than a centimeter.  The peroneal artery is a good caliber vessel beyond this and collateralizes across the ankle to provide flow to the foot.  The posterior tibial artery is visible and reconstitutes through collaterals.  It appears to be a fairly wispy vessel but after occlusions we can see that it does provide flow through the pedal arch.  There is no evidence of anterotibial artery at all.   Given these findings we elected to treat the proximal tib peroneal trunk and peroneal artery disease.  A V18 wire was advanced straight down the popliteal artery and through the occlusions.  An 018 Timmons cross catheter was advanced with it past the occlusions.  It was used to perform an angiogram in the peroneal artery   Findings: Intraluminal location of a catheter in the peroneal artery   At this point we introduced a wire again and over advanced a 3 mm x 100 mm angioplasty balloon was used to treat the peroneal artery and tibioperoneal trunk.  Inflation was to 10 shantel of pressure and held up for 2 minutes.  Completion angiogram was performed.  This showed widely patent flow through this area without evidence of dissection or distal embolization.  I felt that this is significantly improved in the flow to the foot.  We contemplated treating the posterior tibial artery but given that the patient has renal dysfunction and that that would be more challenging, and more importantly that we probably had improved flow enough to allow wound healing we elected not to proceed.  Sheath left in and patient given 20 use of protamine with the plan of removing the sheath once ACT below 260. Impression: Identification and successful treatment of proximal tibial peroneal trunk and peroneal artery focal but critical  disease.  Posterior tibial advanced long segment disease identified which could theoretically be treated but was not given the patient's renal dysfunction and need for only marginal improvement in flow hopefully.  Will need to be seen in clinic in 2 weeks time to see how his wounds are coming along at that time we will need TCO twos and ABIs as toe pressures are not possible status post transmetatarsal amputation.  Need to be kept on dual antiplatelet therapy for at least 3 months. Lois Jackson MD [place] [facility] Vascular Surgery         Picture:

## 2023-04-06 ENCOUNTER — TELEPHONE (OUTPATIENT)
Dept: VASCULAR SURGERY | Facility: CLINIC | Age: 78
End: 2023-04-06
Payer: MEDICARE

## 2023-04-06 ENCOUNTER — TRANSITIONAL CARE UNIT VISIT (OUTPATIENT)
Dept: GERIATRICS | Facility: CLINIC | Age: 78
End: 2023-04-06
Payer: MEDICARE

## 2023-04-06 VITALS
OXYGEN SATURATION: 94 % | RESPIRATION RATE: 20 BRPM | SYSTOLIC BLOOD PRESSURE: 131 MMHG | TEMPERATURE: 97.6 F | DIASTOLIC BLOOD PRESSURE: 62 MMHG | HEIGHT: 70 IN | HEART RATE: 70 BPM | WEIGHT: 185.5 LBS | BODY MASS INDEX: 26.56 KG/M2

## 2023-04-06 DIAGNOSIS — I96: Primary | ICD-10-CM

## 2023-04-06 PROCEDURE — 99308 SBSQ NF CARE LOW MDM 20: CPT | Performed by: FAMILY MEDICINE

## 2023-04-06 NOTE — LETTER
"    4/6/2023        RE: Ever Crane  [address]  [address]  [facility] [address]        [facility] GERIATRICS  [place] Medical Record Number: [id number]  Chief Complaint   Patient presents with     Nursing Home Acute     HPI: Ever Crane is a 77 year old (1945), who is being seen today for an episodic care visit at: [facility] ([facility]) [[id number]].       Today's concern is:   - Resident reports that his scrotal skin is raw and there is some bleeding from it. Reprots he try to wash it with water. Has urinary dripping.     Allergies and PMH/PSH reviewed in EPIC today.    REVIEW OF SYSTEMS:  4 point ROS including Respiratory, CV, GI and , other than that noted in the HPI,  is negative    Objective:   /62   Pulse 70   Temp 97.6  F (36.4  C)   Resp 20   Ht 1.778 m (5' 10\")   Wt 84.1 kg (185 lb 8 oz)   SpO2 94%   BMI 26.62 kg/m    Exam:  General: no concern  Skin: some sloughing over scrotal sac. Some dry of blood in the underwear.   : penile exam is normal.       Labs: Reviewed in the chart and EHR.        Assessment/Plan:  --------------------  Scrotal skin slough query from poor hygiene.   Discussed with the patient about clean hygiene.   Discussed with the nursing staff  Will apply Calmoseptine for now and monitor      MED REC REQUIRED}  Post Medication Reconciliation Status: discharge medications reconciled and changed, per note/orders      Electronically signed by: Isabel Stephens MD        Sincerely,        Isabel Stephens MD      "

## 2023-04-06 NOTE — TELEPHONE ENCOUNTER
[facility]ed [facility] and let them know that they should get the PRAFO boot as soon as possible. Okay to order on [employer] with delivery on 4/14. They should offload his heels as much as possible with pillows etc. until the PRAFO arrives.

## 2023-04-06 NOTE — PROGRESS NOTES
"[facility] GERIATRICS  [place] Medical Record Number: [id number]  Chief Complaint   Patient presents with     Nursing Home Acute     HPI: Ever Crane is a 77 year old (1945), who is being seen today for an episodic care visit at: [facility] ([facility]) [[id number]].       Today's concern is:   - Resident reports that his scrotal skin is raw and there is some bleeding from it. Reprots he try to wash it with water. Has urinary dripping.     Allergies and PMH/PSH reviewed in [facility] today.    REVIEW OF SYSTEMS:  4 point ROS including Respiratory, CV, GI and , other than that noted in the HPI,  is negative    Objective:   /62   Pulse 70   Temp 97.6  F (36.4  C)   Resp 20   Ht 1.778 m (5' 10\")   Wt 84.1 kg (185 lb 8 oz)   SpO2 94%   BMI 26.62 kg/m    Exam:  General: no concern  Skin: some sloughing over scrotal sac. Some dry of blood in the underwear.   : penile exam is normal.       Labs: Reviewed in the chart and EHR.        Assessment/Plan:  --------------------  Scrotal skin slough query from poor hygiene.   Discussed with the patient about clean hygiene.   Discussed with the nursing staff  Will apply Calmoseptine for now and monitor      MED REC REQUIRED}  Post Medication Reconciliation Status: discharge medications reconciled and changed, per note/orders      Electronically signed by: Isabel Stephens MD  "

## 2023-04-06 NOTE — TELEPHONE ENCOUNTER
Caller: TCU    Provider: MD Miguelangel Spears    Detailed reason for call: Ever[facility]CU left a message stating that the PROFO boot can be ordered from [employer], however it won't arrive until 4/14.  They are wondering if this is ok, since this is the date of his surgery?  Please advise and call back with an update    Best phone number to contact: [phone]     Best time to contact: any    Ok to leave a detailed message: Yes    Ok to speak to authorized person if needed: No      (Noted to patient if reason is related to wound or incision, to please send a photo via email or [employer]

## 2023-04-07 ENCOUNTER — TELEPHONE (OUTPATIENT)
Dept: VASCULAR SURGERY | Facility: CLINIC | Age: 78
End: 2023-04-07
Payer: MEDICARE

## 2023-04-07 NOTE — TELEPHONE ENCOUNTER
Spoke with Josafat nurse, at [facility] ([phone]). Verbal orders given, per Dr. Spears, to hold Plavix for 5 days and xarelto for 3 days prior to surgery on 4/14/23. Josafat will also have TCU physician address need for bridging prior to surgery.    Addis Sanderson, Miguelangel Nam DPM; Lainey Flores RN  Per. Dr. STORM GARCIA to hold both.           Previous Messages       ----- Message -----   From: Lainey Flores RN   Sent: 4/6/2023   9:41 AM CDT   To: Addis Sanderson RN   Subject: FW: pre surgical anticoagulation                 Addis, are you able to help with this since Heather is out today?     Alecia, Mulu       ----- Message -----   From: Miguelangel Spears DPM   Sent: 4/5/2023   2:48 PM CDT   To: Heather Lambert RN; Lainey Flores RN   Subject: RE: pre surgical anticoagulation                 Plavix 5 days and xeralto 3 days..ideally

## 2023-04-11 ENCOUNTER — LAB REQUISITION (OUTPATIENT)
Dept: LAB | Facility: CLINIC | Age: 78
End: 2023-04-11
Payer: MEDICARE

## 2023-04-11 DIAGNOSIS — I50.32 CHRONIC DIASTOLIC (CONGESTIVE) HEART FAILURE (H): ICD-10-CM

## 2023-04-12 ENCOUNTER — TELEPHONE (OUTPATIENT)
Dept: VASCULAR SURGERY | Facility: CLINIC | Age: 78
End: 2023-04-12

## 2023-04-12 LAB
ANION GAP SERPL CALCULATED.3IONS-SCNC: 12 MMOL/L (ref 7–15)
BUN SERPL-MCNC: 27.1 MG/DL (ref 8–23)
CALCIUM SERPL-MCNC: 9.8 MG/DL (ref 8.8–10.2)
CHLORIDE SERPL-SCNC: 102 MMOL/L (ref 98–107)
CREAT SERPL-MCNC: 1.28 MG/DL (ref 0.67–1.17)
DEPRECATED HCO3 PLAS-SCNC: 26 MMOL/L (ref 22–29)
ERYTHROCYTE [DISTWIDTH] IN BLOOD BY AUTOMATED COUNT: 15 % (ref 10–15)
GFR SERPL CREATININE-BSD FRML MDRD: 57 ML/MIN/1.73M2
GLUCOSE SERPL-MCNC: 95 MG/DL (ref 70–99)
HCT VFR BLD AUTO: 38.5 % (ref 40–53)
HGB BLD-MCNC: 11.7 G/DL (ref 13.3–17.7)
MCH RBC QN AUTO: 26.2 PG (ref 26.5–33)
MCHC RBC AUTO-ENTMCNC: 30.4 G/DL (ref 31.5–36.5)
MCV RBC AUTO: 86 FL (ref 78–100)
PLATELET # BLD AUTO: 296 10E3/UL (ref 150–450)
POTASSIUM SERPL-SCNC: 4.8 MMOL/L (ref 3.4–5.3)
RBC # BLD AUTO: 4.47 10E6/UL (ref 4.4–5.9)
SODIUM SERPL-SCNC: 140 MMOL/L (ref 136–145)
WBC # BLD AUTO: 9.7 10E3/UL (ref 4–11)

## 2023-04-12 PROCEDURE — 85027 COMPLETE CBC AUTOMATED: CPT | Mod: ORL | Performed by: NURSE PRACTITIONER

## 2023-04-12 PROCEDURE — 36415 COLL VENOUS BLD VENIPUNCTURE: CPT | Mod: ORL | Performed by: NURSE PRACTITIONER

## 2023-04-12 PROCEDURE — 80048 BASIC METABOLIC PNL TOTAL CA: CPT | Mod: ORL | Performed by: NURSE PRACTITIONER

## 2023-04-12 NOTE — TELEPHONE ENCOUNTER
[facility] at the [facility] Dr. Spears's patient is at, Ever Crane, scheduled for Friday 4/14 @ [facility]. She said the patient needs to be rescheduled. Could you give her a call @ [phone]?    Pt was unable to get a preop with MD until 20th. Surgery r/s and instructions reviewed with Trang.       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         Location: [facility]:  [address] [place] [address] (phone: [phone], Fax: [phone])    Date: 4/27    Time: 1230- checking if can go earlier    Admission Type: Outpatient    Surgeon: Dr. Spears    OR Confirmed & :  Yes with Nandini on 4/12/2023    Entered on provider calendar:  Yes    Post Op: 5/5 and 5/19    Wound Vac Needed:  Yes    Home Care Needed: No      Blood Thinners Addressed:  Yes- hold plavix 5 days and xarelto 3 days. Ok per Dr Hamm    Stress Test Clearance: NO

## 2023-04-19 NOTE — PROGRESS NOTES
"[facility] GERIATRICS  [address] [place] [address]  Phone: [phone]  Fax: [phone]  Primary Provider: Adriano Dodd Physician  Pre-op Performing Provider: ANAM WILEY    :[id number]}  PREOPERATIVE EVALUATION:  Today's date: 4/20/2023    Ever Crane is a 78 year old male who presents for a preoperative evaluation.    Surgical Information:  Surgery/Procedure: Transmetatarsal amputation right foot with Achilles tendon lengthening, debridement of right heel ulceration  Surgery Location: [facility]   Surgeon: Miguelangel Spears DPM  Surgery Date: 4/27/23  Time of Surgery: 12:30 pm  Where patient plans to recover: At a nursing home  Fax number for surgical facility: Note does not need to be faxed, will be available electronically in Epic.    Subjective      HPI related to upcoming procedure:   - Do you have a history of heart attack, stroke, stent, bypass or surgery on an artery in the head, neck, heart or legs?ES     - Do you ever have any pain or discomfort in your chest? NO    - Do you have a history of  Heart Failure? NO     - Are you troubled by shortness of breath when: walking on the level, up a slight hill or at night? NO     - Do you currently have a cold, bronchitis or other respiratory infection?. NO     - Do you have a cough, shortness of breath or wheezing?\", COUGH WITH SLIGHT PHLEGM FOR THE LAST FOUR MONTH    - Do you sometimes get pains in the calves of your legs when you walk?\". NO     - Do you or anyone in your family have previous history of blood clots?  NO     - Do you or does anyone in your family have a serious bleeding problem such as prolonged bleeding following surgeries or cuts?. NO     - Have you ever had problems with anemia or been told to take iron pills? NO     - Have you had any abnormal blood loss such as black, tarry or bloody stools, or abnormal vaginal bleeding?\",\"12. NO     - Have you ever had a blood " "transfusion? NO     - Have you or any of your relatives ever had problems with anesthesia? NO     - Do you have sleep apnea, excessive snoring or daytime drowsiness?NO     - Do you have any prosthetic heart valves? NO     - Do you have prosthetic joints?NO      Health Care Directive:  Patient has a Health Care Directive on file      Preoperative Review of :   reviewed - controlled substances reflected in medication list.      Status of Chronic Conditions:  # A-FIB   - Patient has a longstanding history of chronic A-fib currently NOT on  rate and rhythm control. \"Was on sotalol prior to CABG but not since. Even metoprolol was stopped and he was in sinus rhythm on Holter in Dec 2018.\"  - Current treatment regimen includes Rivaroxaban for stroke prevention and denies significant symptoms of lightheadedness, palpitations or dyspnea.     # ANEMIA - Patient has a recent history of moderate-severe anemia, which has not been symptomatic. Work up to date has revealed Hb at 11.7 gm/dl (4/12/23) Treatment has been:on B12 supplement.       # CHF / CAD  - -Patient has a longstanding history of moderate-severe CAD.   - Patient has a longstanding history of moderate-severe CHF. Exacerbating conditions include ischemic heart disease, hypertension and atrial fibrillation. - Currently the patient's condition is COMPENSATED.   - Current treatment regimen includes Angiotensin 2 receptor blocker, diuretic and spirolactone. The patient denies chest pain, edema, orthopnea, SOB or recent weight gain.    -Last Echocardiogram on 8/17/2019:  which revealed  Normal LVEF, normal LV size and systolic function. Mild basal inferolateral hypokinesis, abnormal left vent diastolic function, left trail volume is severely increased. Mild MR.    - Last EKG (5/12/22): NSR, minimal criteria for ant infarct  - ECG: SR with 1st degree AV block, vent asystole.     # Query COPD:  -No PFT study in epic. Query presumed COPD..  -  Patient has been doing " well overall noting. Has chronic cough. Continues on medication regimen consisting of Albuterol inhaler prn without adverse reactions or side effects.    # DIABETES :  - Patient has a longstanding history of Diabetes Type II .   - Patient is being treated with Glipizide and Victoza, and denies significant side effects.   - Control has been HbA1C 7.0%  - Complicating factors include but are not limited to: hypertension, hyperlipidemia and CAD/PVD.       # HYPERLIPIDEMIA:   - Patient has a long history of significant Hyperlipidemia requiring medication for treatment with recent good control. Last LDL in epic dates back to 2021 with LDL 33. On simvastatin 40 mg.   - Patient reports no problems or side effects with the medication.       # HYPERTENSION :  - Patient has longstanding history of HTN , currently denies any symptoms referable to elevated blood pressure. Specifically denies chest pain, palpitations, dyspnea, orthopnea, PND or peripheral edema.   - Blood pressure readings have been in normal range.   BP Readings from Last 3 Encounters:   04/20/23 120/74   04/06/23 131/62   04/05/23 110/60   - Current medication regimen is as listed below. Patient denies any side effects of medication.       # RENAL INSUFFICIENCY:   - Patient has a longstanding history of moderate-severe chronic renal insufficiency.   - Last Cr 1.8 mg/dl (4/12/23)    # SLEEP PROBLEM - NONE    Review of Systems  CONSTITUTIONAL: NEGATIVE for fever, chills, change in weight  INTEGUMENTARY/SKIN: NEGATIVE for worrisome rashes, moles or lesions  EYES: NEGATIVE for vision changes or irritation  ENT/MOUTH: NEGATIVE for ear, mouth and throat problems  RESP: NEGATIVE for significant cough or SOB  CV: NEGATIVE for chest pain, palpitations or peripheral edema  GI: NEGATIVE for nausea, abdominal pain, heartburn, or change in bowel habits  : NEGATIVE for frequency, dysuria, or hematuria  MUSCULOSKELETAL: NEGATIVE for significant arthralgias or  myalgia  NEURO: NEGATIVE for weakness, dizziness or paresthesias  ENDOCRINE: NEGATIVE for temperature intolerance, skin/hair changes  HEME: NEGATIVE for bleeding problems  PSYCHIATRIC: NEGATIVE for changes in mood or affect    Patient Active Problem List    Diagnosis Date Noted     Chronic systolic heart failure (H) 01/17/2023     Priority: Medium     Pressure ulcer of right heel, stage 3 (H) 01/13/2023     Priority: Medium     Pressure ulcer of left heel, stage 3 (H) 01/13/2023     Priority: Medium     Osteomyelitis of ankle and foot (H) 01/13/2023     Priority: Medium     PAD (peripheral artery disease) (H) 01/05/2023     Priority: Medium     Cellulitis and abscess of foot excluding toe 01/05/2023     Priority: Medium     Diabetic ulcer of toe of right foot associated with diabetes mellitus due to underlying condition, with necrosis of bone (H) 01/05/2023     Priority: Medium     Diabetic ulcer of toe of right foot associated with diabetes mellitus due to underlying condition, limited to breakdown of skin (H) 01/05/2023     Priority: Medium     Open wound of both legs with complication 12/28/2022     Priority: Medium     BPH (benign prostatic hyperplasia) 12/06/2022     Priority: Medium     Diarrhea 12/06/2022     Priority: Medium     DM type 2 with diabetic mixed hyperlipidemia (H) 12/06/2022     Priority: Medium     Rectal discharge 10/10/2022     Priority: Medium     Long term current use of anticoagulant therapy 06/07/2022     Priority: Medium     Hyperkalemia 06/07/2022     Priority: Medium     Acute diastolic heart failure (H) 06/07/2022     Priority: Medium     Polyneuropathy due to type 2 diabetes mellitus (H) 06/07/2022     Priority: Medium     Status post amputation of foot (H) 06/07/2022     Priority: Medium     Type 2 diabetes mellitus with other circulatory complication, with long-term current use of insulin (H) 05/25/2022     Priority: Medium     Closed nondisplaced intertrochanteric fracture of left  femur with routine healing, subsequent encounter 05/18/2022     Priority: Medium     Coronary atherosclerosis 05/17/2022     Priority: Medium     Hyperlipidemia 05/17/2022     Priority: Medium     Formatting of this note might be different from the original.  Created by Conversion       Typical atrial flutter (H) 05/17/2022     Priority: Medium     Formatting of this note might be different from the original.  Created by Conversion       Acute posthemorrhagic anemia 05/17/2022     Priority: Medium     Chronic obstructive pulmonary disease, unspecified (H) 05/17/2022     Priority: Medium     Personal history of other diseases of the circulatory system 05/17/2022     Priority: Medium     Occlusion and stenosis of right carotid artery 05/17/2022     Priority: Medium     Lymphedema, not elsewhere classified 05/17/2022     Priority: Medium     Venous insufficiency (chronic) (peripheral) 05/17/2022     Priority: Medium     Essential hypertension 05/12/2022     Priority: Medium     Created by Conversion  Replacement Utility updated for latest IMO load         Ischemic cardiomyopathy 05/12/2022     Priority: Medium     Carotid stenosis, asymptomatic, right 05/12/2022     Priority: Medium     Chronic venous stasis dermatitis 05/12/2022     Priority: Medium     Persistent atrial fibrillation (H) 05/12/2022     Priority: Medium     Formatting of this note might be different from the original.  Was on sotalol prior to CABG but not since. Even metoprolol was stopped and he was in sinus rhythm on Holter in Dec 2018.    Formatting of this note might be different from the original.  Was on sotalol prior to CABG but not since. Even metoprolol was stopped and he was in sinus rhythm on Holter in Dec 2018.       Osteoarthrosis 05/09/2022     Priority: Medium     Neuropathy due to medical condition (H) 05/09/2022     Priority: Medium     Skin tear of lower leg without complication 05/09/2022     Priority: Medium     Foot amputee (H)  04/11/2022     Priority: Medium     Coronary artery disease of autologous bypass graft with stable angina pectoris (H) 03/14/2022     Priority: Medium     Left groin pain 02/28/2022     Priority: Medium     Contact with and (suspected) exposure to covid-19 12/13/2021     Priority: Medium     Constipation, unspecified 10/18/2021     Priority: Medium     Hair loss 10/18/2021     Priority: Medium     Long term (current) use of oral hypoglycemic drugs 05/03/2021     Priority: Medium     Stage 3b chronic kidney disease (H) 11/12/2020     Priority: Medium     Cholecystitis 10/14/2019     Priority: Medium     Clostridium difficile colitis 10/14/2019     Priority: Medium     Mild cognitive impairment 10/14/2019     Priority: Medium     Unspecified open wound, left lower leg, initial encounter 10/14/2019     Priority: Medium     Presence of aortocoronary bypass graft 10/14/2019     Priority: Medium     Fever 06/28/2018     Priority: Medium     Pneumonia 06/26/2018     Priority: Medium     Medically noncompliant 06/20/2018     Priority: Medium     Heart failure with preserved ejection fraction, NYHA class II (H) 01/25/2018     Priority: Medium     Peripheral vascular disease (H) 11/14/2017     Priority: Medium     Acquired lymphedema of lower extremity 10/16/2017     Priority: Medium     Polyneuropathy associated with underlying disease (H) 08/10/2017     Priority: Medium     Venous hypertension, chronic, bilateral 08/10/2017     Priority: Medium     Mcgarry's esophagus 01/01/2012     Priority: Medium     Formatting of this note might be different from the original.  per note of Dr. Tavo Mike of [facility]        Past Medical History:   Diagnosis Date     A-fib (H)      MESHA (acute kidney injury) (H)      Atopic keratoconjunctivitis      Atrial fibrillation (H)     Brian Moe: 8/2011 Cardioversion; CHADS2 VASC = 5; he is on warfarin and sotalol      Atrial flutter (H)      Mcgarry's esophagus 1/1/2012    per note of Dr. Vo  Go of [facility]     Bilateral lower leg cellulitis 8/31/2018     Candidiasis of perineum 1/3/2018     Carotid stenosis, asymptomatic, right      CHF (congestive heart failure) (H)      Cholecystitis, acute 8/18/2019     Closed nondisplaced intertrochanteric fracture of left femur with routine healing, subsequent encounter 5/18/2022     Coronary artery disease due to lipid rich plaque 2000    CABG x2     Diabetes (H)      Diabetic ulcer of both feet (H) 10/31/2017     Dyslexia      Dyslipidemia, goal LDL below 70 2000     Epistaxis      Essential hypertension      Gangrene of left foot (H)      GERD (gastroesophageal reflux disease)      HLD (hyperlipidemia)      HTN (hypertension)      Hyponatremia      MRSA (methicillin resistant Staphylococcus aureus)      Neuropathy      Non-STEMI (non-ST elevated myocardial infarction) (H)      Other atopic dermatitis      Peripheral vascular disease (H)      Pneumonia 6/26/2018     Type 2 diabetes mellitus, without long-term current use of insulin (H)      Unable to function independently 11/13/2017     Past Surgical History:   Procedure Laterality Date     AMPUTATE FOOT Left 11/05/2017    Procedure: LEFT TRANSMETATARSAL AMPUTATION;  Surgeon: Ever Wick MD;  Location: [facility] OR;  Service:      BYPASS GRAFT ARTERY CORONARY N/A 03/06/2000    SVG to OM1, SVG to PDA     BYPASS GRAFT ARTERY CORONARY N/A 10/04/2018    redo CABG due to graft failure     CABG MEASURES GRP       CARDIOVERSION  08/25/2011     CV CORONARY ANGIOGRAM N/A 10/01/2018    Procedure: Coronary Angiogram;  Surgeon: Miki Mac MD;  Location: [facility] Cath Lab;  Service:      INGUINAL HERNIA REPAIR Bilateral 1969    and 1979     IR EXTREMITY ANGIOGRAM BILATERAL  11/3/2017     IR LOWER EXTREMITY ANGIOGRAM LEFT  3/10/2023     IR LOWER EXTREMITY ANGIOGRAM RIGHT  1/24/2023     LAPAROSCOPIC CHOLECYSTECTOMY N/A 08/18/2019    Procedure: CHOLECYSTECTOMY, LAPAROSCOPIC;  Surgeon: Stewart Fountain MD;   Location: [facility] OR;  Service: General     Current Outpatient Medications   Medication Sig Dispense Refill     acetaminophen (TYLENOL) 325 MG tablet 1000 mg tid scheduled and 500 mg bid prn       albuterol (PROAIR HFA/PROVENTIL HFA/VENTOLIN HFA) 108 (90 Base) MCG/ACT inhaler Inhale 2 puffs into the lungs 2 times daily And q4h PRN       celecoxib (CELEBREX) 100 MG capsule Take 100 mg by mouth daily as needed       cetirizine (ZYRTEC) 10 MG tablet Take 10 mg by mouth daily       clopidogrel (PLAVIX) 75 MG tablet Take 1 tablet (75 mg) by mouth daily Start taking medication the day after the procedure. 90 tablet 1     cyanocobalamin (VITAMIN B-12) 2500 MCG SUBL sublingual tablet Place 2,500 mcg under the tongue daily       furosemide (LASIX) 40 MG tablet Take 40 mg by mouth daily       gabapentin (NEURONTIN) 100 MG capsule Take 100 mg by mouth 2 times daily       glipiZIDE (GLUCOTROL) 10 MG tablet Take 10 mg by mouth 2 times daily (before meals)       ketoconazole (NIZORAL) 2 % external shampoo Apply topically twice a week Mon and Fri.       liraglutide (VICTOZA) 18 MG/3ML solution Inject 0.6 mg Subcutaneous daily       losartan (COZAAR) 25 MG tablet Take 25 mg by mouth daily       mineral oil-hydrophilic petrolatum (AQUAPHOR) external ointment Apply topically 2 times daily       mineral oil-hydrophilic petrolatum (AQUAPHOR) external ointment Apply topically 2 times daily       nitroGLYcerin (NITROSTAT) 0.4 MG sublingual tablet Place 0.4 mg under the tongue every 5 minutes as needed       omeprazole (PRILOSEC) 20 MG CR capsule Take 20 mg by mouth daily       oxyCODONE (ROXICODONE) 5 MG tablet Take 0.5 tablets (2.5 mg) by mouth every 6 hours as needed for pain 24 tablet 0     polyethylene glycol (MIRALAX) 17 g packet Take 1 packet by mouth daily       rivaroxaban ANTICOAGULANT (XARELTO) 15 MG TABS tablet Take by mouth daily (with dinner)       senna-docusate (SENOKOT-S/PERICOLACE) 8.6-50 MG tablet Take 1  "tablet by mouth 2 times daily And BID PRN       simvastatin (ZOCOR) 40 MG tablet Take 40 mg by mouth At Bedtime       spironolactone (ALDACTONE) 25 MG tablet Take 25 mg by mouth daily       tamsulosin (FLOMAX) 0.4 MG capsule Take 0.4 mg by mouth daily         Allergies   Allergen Reactions     Doxycycline Rash     Latex Rash     Penicillin V Rash     Reaction occurred as a child. Patient tolerated Zosyn 2018, Cefazolin 10/2018, and has also tolerated Augmentin.     Penicillins Rash     Reaction occurred as a child. Patient tolerated Zosyn 2018, Cefazolin 10/2018, and has also tolerated Augmentin.        Social History     Tobacco Use     Smoking status: Former     Packs/day: 1.00     Years: 36.00     Pack years: 36.00     Types: Cigarettes     Start date: 1964     Quit date: 2000     Years since quittin.9     Smokeless tobacco: Never   Vaping Use     Vaping status: Not on file   Substance Use Topics     Alcohol use: Yes     Alcohol/week: 5.0 standard drinks of alcohol     Types: 1 Cans of beer per week     Family History   Problem Relation Age of Onset     Sudden Death Mother 85.00     CABG Father      Valvular heart disease Father      Esophageal Cancer Father 58.00        cause of death     No Known Problems Son      No Known Problems Sister      Obesity Sister      Osteoarthritis Sister      No Known Problems Sister      No Known Problems Grandchild      No Known Problems Grandchild      History   Drug Use No         Objective     /74   Pulse 86   Temp 97.9  F (36.6  C)   Resp 18   Ht 1.778 m (5' 10\")   Wt 84.4 kg (186 lb)   SpO2 96%   BMI 26.69 kg/m      Physical Exam    GENERAL APPEARANCE: healthy, alert and no distress     EYES: EOMI,  PERRL     HENT: ear canals and TM's normal and nose and mouth without ulcers or lesions     NECK: no adenopathy, no asymmetry, masses, or scars and thyroid normal to palpation     RESP: lungs clear to auscultation - no rales, rhonchi or wheezes    "  CV: regular rates and rhythm, normal S1 S2, no S3 or S4 and no murmur, click or rub     ABDOMEN:  soft, nontender, no HSM or masses and bowel sounds normal     MS: extremities normal- no gross deformities noted, no evidence of inflammation in joints, FROM in all extremities.     SKIN: no suspicious lesions or rashes     NEURO: Normal strength and tone, sensory exam grossly normal, mentation intact and speech normal     PSYCH: mentation appears normal. and affect normal/bright     LYMPHATICS: No cervical adenopathy    Recent Labs   Lab Test 04/12/23  0750 03/10/23  0853 01/24/23  0719 10/11/22  1333 07/12/22  0808 05/24/22  0752 10/21/21  1010   HGB 11.7* 11.3* 10.8*   < >  --    < > 14.3    269 174   < >  --    < > 182   INR  --   --  1.23*  --   --   --   --     137 139   < > 141   < > 138   POTASSIUM 4.8 5.1 5.5*   < > 4.6   < > 4.5   CR 1.28* 1.35* 1.93*   < > 1.32*   < > 1.29   A1C  --   --   --   --  7.0*  --  6.3*    < > = values in this interval not displayed.        Diagnostics:  Recent Results (from the past 720 hour(s))   CBC with platelets    Collection Time: 04/12/23  7:50 AM   Result Value Ref Range    WBC Count 9.7 4.0 - 11.0 10e3/uL    RBC Count 4.47 4.40 - 5.90 10e6/uL    Hemoglobin 11.7 (L) 13.3 - 17.7 g/dL    Hematocrit 38.5 (L) 40.0 - 53.0 %    MCV 86 78 - 100 fL    MCH 26.2 (L) 26.5 - 33.0 pg    MCHC 30.4 (L) 31.5 - 36.5 g/dL    RDW 15.0 10.0 - 15.0 %    Platelet Count 296 150 - 450 10e3/uL   Basic metabolic panel    Collection Time: 04/12/23  7:50 AM   Result Value Ref Range    Sodium 140 136 - 145 mmol/L    Potassium 4.8 3.4 - 5.3 mmol/L    Chloride 102 98 - 107 mmol/L    Carbon Dioxide (CO2) 26 22 - 29 mmol/L    Anion Gap 12 7 - 15 mmol/L    Urea Nitrogen 27.1 (H) 8.0 - 23.0 mg/dL    Creatinine 1.28 (H) 0.67 - 1.17 mg/dL    Calcium 9.8 8.8 - 10.2 mg/dL    Glucose 95 70 - 99 mg/dL    GFR Estimate 57 (L) >60 mL/min/1.73m2      Revised Cardiac Risk Index (RCRI):  The patient has the  following serious cardiovascular risks for perioperative complications:   - Coronary Artery Disease (MI, positive stress test, angina, Qs on EKG) = 1 point     RCRI Interpretation: 1 point: Class II (low risk - 0.9% complication rate)          Assessment & Plan     The proposed surgical procedure is considered   INTERMEDIATE risk.    Problem List Items Addressed This Visit     Chronic obstructive pulmonary disease, unspecified (H)    Coronary artery disease of autologous bypass graft with stable angina pectoris (H)    Heart failure with preserved ejection fraction, NYHA class II (H)    PAD (peripheral artery disease) (H)    Persistent atrial fibrillation (H)    Polyneuropathy due to type 2 diabetes mellitus (H) - Primary    Stage 3b chronic kidney disease (H)    Type 2 diabetes mellitus with other circulatory complication, with long-term current use of insulin (H)       Risks and Recommendations:  The patient has the following additional risks and recommendations for perioperative complications:   - Consult Hospitalist / IM to assist with post-op medical management    Cardiovascular:   - ECG: pending result.   - No need for echo or stress test    Diabetes:  - Patient is not on insulin therapy: regular NPO guidelines can be followed.     Pulmonary:    - Incentive spirometry post-op   - Consider Respiratory Therapy (Respiratory Care IP Consult) post-op  - CXR: No focal opacity. Query bronchitis.    Anemia/Bleeding/Clotting:    - Anemia and does not require treatment prior to surgery. Monitor hemoglobin postoperatively    Infection:    - Patient has a history of MRSA    Medication Instructions:   - rivaroxaban (Xarelto): Neuraxial or regional block anticipated AND CrCl (>=) 50 mL/min. HOLD 3 days before surgery. No bridging is required. Risk of bleeding risk outweighs benefit     - clopidogrel (Plavix) No contraindication to stopping Plavix, HOLD 5 days before surgery.      - ACE/ARB: HOLD due to exceptional risk of  hypotension during surgery.    - Diuretics: May continue due to heart failure.   - Statins: Continue taking on the day of surgery.       -  glipizide: HOLD day of surgery   - Liraglutide HOLD day of surgery       - Opioids: Continue without modification.   - pregabalin, gabapentin: Continue without modification.      - celecoxib (Celebrex): HOLD 3 days before surgery.      - rescue Inhaler: Continue PRN. Bring to hospital on the day of surgery.     HOLD on AM of the surgery:  - cyanocobalamin  - cetrizine ( if given in am)    GIVE WITH SIP OF WATER AT LEAST 2 HOURS PRIOR TO SURGERY:  - omeprazole      APPROVAL GIVEN to proceed with proposed procedure, without further diagnostic evaluation.  5    Signed Electronically by: Isabel Stephens MD  Copy of this evaluation report is provided to requesting physician.

## 2023-04-19 NOTE — H&P (VIEW-ONLY)
"[facility]ERIATRICS  [address] [place] [address]  Phone: [phone]  Fax: [phone]  Primary Provider: Adriano Dodd Physician  Pre-op Performing Provider: ANAM WILEY    :[id number]}  PREOPERATIVE EVALUATION:  Today's date: 4/20/2023    Ever Crane is a 78 year old male who presents for a preoperative evaluation.    Surgical Information:  Surgery/Procedure: Transmetatarsal amputation right foot with Achilles tendon lengthening, debridement of right heel ulceration  Surgery Location: [facility]   Surgeon: Miguelangel Spears DPM  Surgery Date: 4/27/23  Time of Surgery: 12:30 pm  Where patient plans to recover: At a nursing home  Fax number for surgical facility: Note does not need to be faxed, will be available electronically in Epic.    Subjective      HPI related to upcoming procedure:   - Do you have a history of heart attack, stroke, stent, bypass or surgery on an artery in the head, neck, heart or legs?ES     - Do you ever have any pain or discomfort in your chest? NO    - Do you have a history of  Heart Failure? NO     - Are you troubled by shortness of breath when: walking on the level, up a slight hill or at night? NO     - Do you currently have a cold, bronchitis or other respiratory infection?. NO     - Do you have a cough, shortness of breath or wheezing?\", COUGH WITH SLIGHT PHLEGM FOR THE LAST FOUR MONTH    - Do you sometimes get pains in the calves of your legs when you walk?\". NO     - Do you or anyone in your family have previous history of blood clots?  NO     - Do you or does anyone in your family have a serious bleeding problem such as prolonged bleeding following surgeries or cuts?. NO     - Have you ever had problems with anemia or been told to take iron pills? NO     - Have you had any abnormal blood loss such as black, tarry or bloody stools, or abnormal vaginal bleeding?\",\"12. NO     - Have you ever had a blood " "transfusion? NO     - Have you or any of your relatives ever had problems with anesthesia? NO     - Do you have sleep apnea, excessive snoring or daytime drowsiness?NO     - Do you have any prosthetic heart valves? NO     - Do you have prosthetic joints?NO      Health Care Directive:  Patient has a Health Care Directive on file      Preoperative Review of :   reviewed - controlled substances reflected in medication list.      Status of Chronic Conditions:  # A-FIB   - Patient has a longstanding history of chronic A-fib currently NOT on  rate and rhythm control. \"Was on sotalol prior to CABG but not since. Even metoprolol was stopped and he was in sinus rhythm on Holter in Dec 2018.\"  - Current treatment regimen includes Rivaroxaban for stroke prevention and denies significant symptoms of lightheadedness, palpitations or dyspnea.     # ANEMIA - Patient has a recent history of moderate-severe anemia, which has not been symptomatic. Work up to date has revealed Hb at 11.7 gm/dl (4/12/23) Treatment has been:on B12 supplement.       # CHF / CAD  - -Patient has a longstanding history of moderate-severe CAD.   - Patient has a longstanding history of moderate-severe CHF. Exacerbating conditions include ischemic heart disease, hypertension and atrial fibrillation. - Currently the patient's condition is COMPENSATED.   - Current treatment regimen includes Angiotensin 2 receptor blocker, diuretic and spirolactone. The patient denies chest pain, edema, orthopnea, SOB or recent weight gain.    -Last Echocardiogram on 8/17/2019:  which revealed  Normal LVEF, normal LV size and systolic function. Mild basal inferolateral hypokinesis, abnormal left vent diastolic function, left trail volume is severely increased. Mild MR.    - Last EKG (5/12/22): NSR, minimal criteria for ant infarct  - ECG: SR with 1st degree AV block, vent asystole.     # Query COPD:  -No PFT study in epic. Query presumed COPD..  -  Patient has been doing " well overall noting. Has chronic cough. Continues on medication regimen consisting of Albuterol inhaler prn without adverse reactions or side effects.    # DIABETES :  - Patient has a longstanding history of Diabetes Type II .   - Patient is being treated with Glipizide and Victoza, and denies significant side effects.   - Control has been HbA1C 7.0%  - Complicating factors include but are not limited to: hypertension, hyperlipidemia and CAD/PVD.       # HYPERLIPIDEMIA:   - Patient has a long history of significant Hyperlipidemia requiring medication for treatment with recent good control. Last LDL in epic dates back to 2021 with LDL 33. On simvastatin 40 mg.   - Patient reports no problems or side effects with the medication.       # HYPERTENSION :  - Patient has longstanding history of HTN , currently denies any symptoms referable to elevated blood pressure. Specifically denies chest pain, palpitations, dyspnea, orthopnea, PND or peripheral edema.   - Blood pressure readings have been in normal range.   BP Readings from Last 3 Encounters:   04/20/23 120/74   04/06/23 131/62   04/05/23 110/60   - Current medication regimen is as listed below. Patient denies any side effects of medication.       # RENAL INSUFFICIENCY:   - Patient has a longstanding history of moderate-severe chronic renal insufficiency.   - Last Cr 1.8 mg/dl (4/12/23)    # SLEEP PROBLEM - NONE    Review of Systems  CONSTITUTIONAL: NEGATIVE for fever, chills, change in weight  INTEGUMENTARY/SKIN: NEGATIVE for worrisome rashes, moles or lesions  EYES: NEGATIVE for vision changes or irritation  ENT/MOUTH: NEGATIVE for ear, mouth and throat problems  RESP: NEGATIVE for significant cough or SOB  CV: NEGATIVE for chest pain, palpitations or peripheral edema  GI: NEGATIVE for nausea, abdominal pain, heartburn, or change in bowel habits  : NEGATIVE for frequency, dysuria, or hematuria  MUSCULOSKELETAL: NEGATIVE for significant arthralgias or  myalgia  NEURO: NEGATIVE for weakness, dizziness or paresthesias  ENDOCRINE: NEGATIVE for temperature intolerance, skin/hair changes  HEME: NEGATIVE for bleeding problems  PSYCHIATRIC: NEGATIVE for changes in mood or affect    Patient Active Problem List    Diagnosis Date Noted     Chronic systolic heart failure (H) 01/17/2023     Priority: Medium     Pressure ulcer of right heel, stage 3 (H) 01/13/2023     Priority: Medium     Pressure ulcer of left heel, stage 3 (H) 01/13/2023     Priority: Medium     Osteomyelitis of ankle and foot (H) 01/13/2023     Priority: Medium     PAD (peripheral artery disease) (H) 01/05/2023     Priority: Medium     Cellulitis and abscess of foot excluding toe 01/05/2023     Priority: Medium     Diabetic ulcer of toe of right foot associated with diabetes mellitus due to underlying condition, with necrosis of bone (H) 01/05/2023     Priority: Medium     Diabetic ulcer of toe of right foot associated with diabetes mellitus due to underlying condition, limited to breakdown of skin (H) 01/05/2023     Priority: Medium     Open wound of both legs with complication 12/28/2022     Priority: Medium     BPH (benign prostatic hyperplasia) 12/06/2022     Priority: Medium     Diarrhea 12/06/2022     Priority: Medium     DM type 2 with diabetic mixed hyperlipidemia (H) 12/06/2022     Priority: Medium     Rectal discharge 10/10/2022     Priority: Medium     Long term current use of anticoagulant therapy 06/07/2022     Priority: Medium     Hyperkalemia 06/07/2022     Priority: Medium     Acute diastolic heart failure (H) 06/07/2022     Priority: Medium     Polyneuropathy due to type 2 diabetes mellitus (H) 06/07/2022     Priority: Medium     Status post amputation of foot (H) 06/07/2022     Priority: Medium     Type 2 diabetes mellitus with other circulatory complication, with long-term current use of insulin (H) 05/25/2022     Priority: Medium     Closed nondisplaced intertrochanteric fracture of left  femur with routine healing, subsequent encounter 05/18/2022     Priority: Medium     Coronary atherosclerosis 05/17/2022     Priority: Medium     Hyperlipidemia 05/17/2022     Priority: Medium     Formatting of this note might be different from the original.  Created by Conversion       Typical atrial flutter (H) 05/17/2022     Priority: Medium     Formatting of this note might be different from the original.  Created by Conversion       Acute posthemorrhagic anemia 05/17/2022     Priority: Medium     Chronic obstructive pulmonary disease, unspecified (H) 05/17/2022     Priority: Medium     Personal history of other diseases of the circulatory system 05/17/2022     Priority: Medium     Occlusion and stenosis of right carotid artery 05/17/2022     Priority: Medium     Lymphedema, not elsewhere classified 05/17/2022     Priority: Medium     Venous insufficiency (chronic) (peripheral) 05/17/2022     Priority: Medium     Essential hypertension 05/12/2022     Priority: Medium     Created by Conversion  Replacement Utility updated for latest IMO load         Ischemic cardiomyopathy 05/12/2022     Priority: Medium     Carotid stenosis, asymptomatic, right 05/12/2022     Priority: Medium     Chronic venous stasis dermatitis 05/12/2022     Priority: Medium     Persistent atrial fibrillation (H) 05/12/2022     Priority: Medium     Formatting of this note might be different from the original.  Was on sotalol prior to CABG but not since. Even metoprolol was stopped and he was in sinus rhythm on Holter in Dec 2018.    Formatting of this note might be different from the original.  Was on sotalol prior to CABG but not since. Even metoprolol was stopped and he was in sinus rhythm on Holter in Dec 2018.       Osteoarthrosis 05/09/2022     Priority: Medium     Neuropathy due to medical condition (H) 05/09/2022     Priority: Medium     Skin tear of lower leg without complication 05/09/2022     Priority: Medium     Foot amputee (H)  04/11/2022     Priority: Medium     Coronary artery disease of autologous bypass graft with stable angina pectoris (H) 03/14/2022     Priority: Medium     Left groin pain 02/28/2022     Priority: Medium     Contact with and (suspected) exposure to covid-19 12/13/2021     Priority: Medium     Constipation, unspecified 10/18/2021     Priority: Medium     Hair loss 10/18/2021     Priority: Medium     Long term (current) use of oral hypoglycemic drugs 05/03/2021     Priority: Medium     Stage 3b chronic kidney disease (H) 11/12/2020     Priority: Medium     Cholecystitis 10/14/2019     Priority: Medium     Clostridium difficile colitis 10/14/2019     Priority: Medium     Mild cognitive impairment 10/14/2019     Priority: Medium     Unspecified open wound, left lower leg, initial encounter 10/14/2019     Priority: Medium     Presence of aortocoronary bypass graft 10/14/2019     Priority: Medium     Fever 06/28/2018     Priority: Medium     Pneumonia 06/26/2018     Priority: Medium     Medically noncompliant 06/20/2018     Priority: Medium     Heart failure with preserved ejection fraction, NYHA class II (H) 01/25/2018     Priority: Medium     Peripheral vascular disease (H) 11/14/2017     Priority: Medium     Acquired lymphedema of lower extremity 10/16/2017     Priority: Medium     Polyneuropathy associated with underlying disease (H) 08/10/2017     Priority: Medium     Venous hypertension, chronic, bilateral 08/10/2017     Priority: Medium     Mcgarry's esophagus 01/01/2012     Priority: Medium     Formatting of this note might be different from the original.  per note of Dr. Tavo Mike of [facility]        Past Medical History:   Diagnosis Date     A-fib (H)      MESHA (acute kidney injury) (H)      Atopic keratoconjunctivitis      Atrial fibrillation (H)     Brian Moe: 8/2011 Cardioversion; CHADS2 VASC = 5; he is on warfarin and sotalol      Atrial flutter (H)      Mcgarry's esophagus 1/1/2012    per note of Dr. Vo  Go of [facility]     Bilateral lower leg cellulitis 8/31/2018     Candidiasis of perineum 1/3/2018     Carotid stenosis, asymptomatic, right      CHF (congestive heart failure) (H)      Cholecystitis, acute 8/18/2019     Closed nondisplaced intertrochanteric fracture of left femur with routine healing, subsequent encounter 5/18/2022     Coronary artery disease due to lipid rich plaque 2000    CABG x2     Diabetes (H)      Diabetic ulcer of both feet (H) 10/31/2017     Dyslexia      Dyslipidemia, goal LDL below 70 2000     Epistaxis      Essential hypertension      Gangrene of left foot (H)      GERD (gastroesophageal reflux disease)      HLD (hyperlipidemia)      HTN (hypertension)      Hyponatremia      MRSA (methicillin resistant Staphylococcus aureus)      Neuropathy      Non-STEMI (non-ST elevated myocardial infarction) (H)      Other atopic dermatitis      Peripheral vascular disease (H)      Pneumonia 6/26/2018     Type 2 diabetes mellitus, without long-term current use of insulin (H)      Unable to function independently 11/13/2017     Past Surgical History:   Procedure Laterality Date     AMPUTATE FOOT Left 11/05/2017    Procedure: LEFT TRANSMETATARSAL AMPUTATION;  Surgeon: Ever Wick MD;  Location: [facility] OR;  Service:      BYPASS GRAFT ARTERY CORONARY N/A 03/06/2000    SVG to OM1, SVG to PDA     BYPASS GRAFT ARTERY CORONARY N/A 10/04/2018    redo CABG due to graft failure     CABG MEASURES GRP       CARDIOVERSION  08/25/2011     CV CORONARY ANGIOGRAM N/A 10/01/2018    Procedure: Coronary Angiogram;  Surgeon: Miki Mac MD;  Location: [facility] Lab;  Service:      INGUINAL HERNIA REPAIR Bilateral 1969    and 1979     IR EXTREMITY ANGIOGRAM BILATERAL  11/3/2017     IR LOWER EXTREMITY ANGIOGRAM LEFT  3/10/2023     IR LOWER EXTREMITY ANGIOGRAM RIGHT  1/24/2023     LAPAROSCOPIC CHOLECYSTECTOMY N/A 08/18/2019    Procedure: CHOLECYSTECTOMY, LAPAROSCOPIC;  Surgeon: Stewart Fountain MD;   Location: [facility] OR;  Service: General     Current Outpatient Medications   Medication Sig Dispense Refill     acetaminophen (TYLENOL) 325 MG tablet 1000 mg tid scheduled and 500 mg bid prn       albuterol (PROAIR HFA/PROVENTIL HFA/VENTOLIN HFA) 108 (90 Base) MCG/ACT inhaler Inhale 2 puffs into the lungs 2 times daily And q4h PRN       celecoxib (CELEBREX) 100 MG capsule Take 100 mg by mouth daily as needed       cetirizine (ZYRTEC) 10 MG tablet Take 10 mg by mouth daily       clopidogrel (PLAVIX) 75 MG tablet Take 1 tablet (75 mg) by mouth daily Start taking medication the day after the procedure. 90 tablet 1     cyanocobalamin (VITAMIN B-12) 2500 MCG SUBL sublingual tablet Place 2,500 mcg under the tongue daily       furosemide (LASIX) 40 MG tablet Take 40 mg by mouth daily       gabapentin (NEURONTIN) 100 MG capsule Take 100 mg by mouth 2 times daily       glipiZIDE (GLUCOTROL) 10 MG tablet Take 10 mg by mouth 2 times daily (before meals)       ketoconazole (NIZORAL) 2 % external shampoo Apply topically twice a week Mon and Fri.       liraglutide (VICTOZA) 18 MG/3ML solution Inject 0.6 mg Subcutaneous daily       losartan (COZAAR) 25 MG tablet Take 25 mg by mouth daily       mineral oil-hydrophilic petrolatum (AQUAPHOR) external ointment Apply topically 2 times daily       mineral oil-hydrophilic petrolatum (AQUAPHOR) external ointment Apply topically 2 times daily       nitroGLYcerin (NITROSTAT) 0.4 MG sublingual tablet Place 0.4 mg under the tongue every 5 minutes as needed       omeprazole (PRILOSEC) 20 MG CR capsule Take 20 mg by mouth daily       oxyCODONE (ROXICODONE) 5 MG tablet Take 0.5 tablets (2.5 mg) by mouth every 6 hours as needed for pain 24 tablet 0     polyethylene glycol (MIRALAX) 17 g packet Take 1 packet by mouth daily       rivaroxaban ANTICOAGULANT (XARELTO) 15 MG TABS tablet Take by mouth daily (with dinner)       senna-docusate (SENOKOT-S/PERICOLACE) 8.6-50 MG tablet Take 1  "tablet by mouth 2 times daily And BID PRN       simvastatin (ZOCOR) 40 MG tablet Take 40 mg by mouth At Bedtime       spironolactone (ALDACTONE) 25 MG tablet Take 25 mg by mouth daily       tamsulosin (FLOMAX) 0.4 MG capsule Take 0.4 mg by mouth daily         Allergies   Allergen Reactions     Doxycycline Rash     Latex Rash     Penicillin V Rash     Reaction occurred as a child. Patient tolerated Zosyn 2018, Cefazolin 10/2018, and has also tolerated Augmentin.     Penicillins Rash     Reaction occurred as a child. Patient tolerated Zosyn 2018, Cefazolin 10/2018, and has also tolerated Augmentin.        Social History     Tobacco Use     Smoking status: Former     Packs/day: 1.00     Years: 36.00     Pack years: 36.00     Types: Cigarettes     Start date: 1964     Quit date: 2000     Years since quittin.9     Smokeless tobacco: Never   Vaping Use     Vaping status: Not on file   Substance Use Topics     Alcohol use: Yes     Alcohol/week: 5.0 standard drinks of alcohol     Types: 1 Cans of beer per week     Family History   Problem Relation Age of Onset     Sudden Death Mother 85.00     CABG Father      Valvular heart disease Father      Esophageal Cancer Father 58.00        cause of death     No Known Problems Son      No Known Problems Sister      Obesity Sister      Osteoarthritis Sister      No Known Problems Sister      No Known Problems Grandchild      No Known Problems Grandchild      History   Drug Use No         Objective     /74   Pulse 86   Temp 97.9  F (36.6  C)   Resp 18   Ht 1.778 m (5' 10\")   Wt 84.4 kg (186 lb)   SpO2 96%   BMI 26.69 kg/m      Physical Exam    GENERAL APPEARANCE: healthy, alert and no distress     EYES: EOMI,  PERRL     HENT: ear canals and TM's normal and nose and mouth without ulcers or lesions     NECK: no adenopathy, no asymmetry, masses, or scars and thyroid normal to palpation     RESP: lungs clear to auscultation - no rales, rhonchi or wheezes    "  CV: regular rates and rhythm, normal S1 S2, no S3 or S4 and no murmur, click or rub     ABDOMEN:  soft, nontender, no HSM or masses and bowel sounds normal     MS: extremities normal- no gross deformities noted, no evidence of inflammation in joints, FROM in all extremities.     SKIN: no suspicious lesions or rashes     NEURO: Normal strength and tone, sensory exam grossly normal, mentation intact and speech normal     PSYCH: mentation appears normal. and affect normal/bright     LYMPHATICS: No cervical adenopathy    Recent Labs   Lab Test 04/12/23  0750 03/10/23  0853 01/24/23  0719 10/11/22  1333 07/12/22  0808 05/24/22  0752 10/21/21  1010   HGB 11.7* 11.3* 10.8*   < >  --    < > 14.3    269 174   < >  --    < > 182   INR  --   --  1.23*  --   --   --   --     137 139   < > 141   < > 138   POTASSIUM 4.8 5.1 5.5*   < > 4.6   < > 4.5   CR 1.28* 1.35* 1.93*   < > 1.32*   < > 1.29   A1C  --   --   --   --  7.0*  --  6.3*    < > = values in this interval not displayed.        Diagnostics:  Recent Results (from the past 720 hour(s))   CBC with platelets    Collection Time: 04/12/23  7:50 AM   Result Value Ref Range    WBC Count 9.7 4.0 - 11.0 10e3/uL    RBC Count 4.47 4.40 - 5.90 10e6/uL    Hemoglobin 11.7 (L) 13.3 - 17.7 g/dL    Hematocrit 38.5 (L) 40.0 - 53.0 %    MCV 86 78 - 100 fL    MCH 26.2 (L) 26.5 - 33.0 pg    MCHC 30.4 (L) 31.5 - 36.5 g/dL    RDW 15.0 10.0 - 15.0 %    Platelet Count 296 150 - 450 10e3/uL   Basic metabolic panel    Collection Time: 04/12/23  7:50 AM   Result Value Ref Range    Sodium 140 136 - 145 mmol/L    Potassium 4.8 3.4 - 5.3 mmol/L    Chloride 102 98 - 107 mmol/L    Carbon Dioxide (CO2) 26 22 - 29 mmol/L    Anion Gap 12 7 - 15 mmol/L    Urea Nitrogen 27.1 (H) 8.0 - 23.0 mg/dL    Creatinine 1.28 (H) 0.67 - 1.17 mg/dL    Calcium 9.8 8.8 - 10.2 mg/dL    Glucose 95 70 - 99 mg/dL    GFR Estimate 57 (L) >60 mL/min/1.73m2      Revised Cardiac Risk Index (RCRI):  The patient has the  following serious cardiovascular risks for perioperative complications:   - Coronary Artery Disease (MI, positive stress test, angina, Qs on EKG) = 1 point     RCRI Interpretation: 1 point: Class II (low risk - 0.9% complication rate)          Assessment & Plan     The proposed surgical procedure is considered   INTERMEDIATE risk.    Problem List Items Addressed This Visit     Chronic obstructive pulmonary disease, unspecified (H)    Coronary artery disease of autologous bypass graft with stable angina pectoris (H)    Heart failure with preserved ejection fraction, NYHA class II (H)    PAD (peripheral artery disease) (H)    Persistent atrial fibrillation (H)    Polyneuropathy due to type 2 diabetes mellitus (H) - Primary    Stage 3b chronic kidney disease (H)    Type 2 diabetes mellitus with other circulatory complication, with long-term current use of insulin (H)       Risks and Recommendations:  The patient has the following additional risks and recommendations for perioperative complications:   - Consult Hospitalist / IM to assist with post-op medical management    Cardiovascular:   - ECG: pending result.   - No need for echo or stress test    Diabetes:  - Patient is not on insulin therapy: regular NPO guidelines can be followed.     Pulmonary:    - Incentive spirometry post-op   - Consider Respiratory Therapy (Respiratory Care IP Consult) post-op  - CXR: No focal opacity. Query bronchitis.    Anemia/Bleeding/Clotting:    - Anemia and does not require treatment prior to surgery. Monitor hemoglobin postoperatively    Infection:    - Patient has a history of MRSA    Medication Instructions:   - rivaroxaban (Xarelto): Neuraxial or regional block anticipated AND CrCl (>=) 50 mL/min. HOLD 3 days before surgery. No bridging is required. Risk of bleeding risk outweighs benefit     - clopidogrel (Plavix) No contraindication to stopping Plavix, HOLD 5 days before surgery.      - ACE/ARB: HOLD due to exceptional risk of  hypotension during surgery.    - Diuretics: May continue due to heart failure.   - Statins: Continue taking on the day of surgery.       -  glipizide: HOLD day of surgery   - Liraglutide HOLD day of surgery       - Opioids: Continue without modification.   - pregabalin, gabapentin: Continue without modification.      - celecoxib (Celebrex): HOLD 3 days before surgery.      - rescue Inhaler: Continue PRN. Bring to hospital on the day of surgery.     HOLD on AM of the surgery:  - cyanocobalamin  - cetrizine ( if given in am)    GIVE WITH SIP OF WATER AT LEAST 2 HOURS PRIOR TO SURGERY:  - omeprazole      APPROVAL GIVEN to proceed with proposed procedure, without further diagnostic evaluation.  5    Signed Electronically by: Isabel Stephens MD  Copy of this evaluation report is provided to requesting physician.

## 2023-04-20 ENCOUNTER — TRANSITIONAL CARE UNIT VISIT (OUTPATIENT)
Dept: GERIATRICS | Facility: CLINIC | Age: 78
End: 2023-04-20
Payer: MEDICARE

## 2023-04-20 VITALS
BODY MASS INDEX: 26.63 KG/M2 | TEMPERATURE: 97.9 F | SYSTOLIC BLOOD PRESSURE: 120 MMHG | RESPIRATION RATE: 18 BRPM | OXYGEN SATURATION: 96 % | WEIGHT: 186 LBS | DIASTOLIC BLOOD PRESSURE: 74 MMHG | HEIGHT: 70 IN | HEART RATE: 86 BPM

## 2023-04-20 DIAGNOSIS — E11.42 POLYNEUROPATHY DUE TO TYPE 2 DIABETES MELLITUS (H): ICD-10-CM

## 2023-04-20 DIAGNOSIS — J44.9 CHRONIC OBSTRUCTIVE PULMONARY DISEASE, UNSPECIFIED COPD TYPE (H): ICD-10-CM

## 2023-04-20 DIAGNOSIS — N18.31 STAGE 3A CHRONIC KIDNEY DISEASE (H): ICD-10-CM

## 2023-04-20 DIAGNOSIS — I73.9 PAD (PERIPHERAL ARTERY DISEASE) (H): Primary | ICD-10-CM

## 2023-04-20 DIAGNOSIS — I50.30 HEART FAILURE WITH PRESERVED EJECTION FRACTION, NYHA CLASS II (H): ICD-10-CM

## 2023-04-20 DIAGNOSIS — E11.59 TYPE 2 DIABETES MELLITUS WITH OTHER CIRCULATORY COMPLICATION, WITH LONG-TERM CURRENT USE OF INSULIN (H): ICD-10-CM

## 2023-04-20 DIAGNOSIS — I48.19 PERSISTENT ATRIAL FIBRILLATION (H): ICD-10-CM

## 2023-04-20 DIAGNOSIS — Z79.4 TYPE 2 DIABETES MELLITUS WITH OTHER CIRCULATORY COMPLICATION, WITH LONG-TERM CURRENT USE OF INSULIN (H): ICD-10-CM

## 2023-04-20 DIAGNOSIS — I25.728 CORONARY ARTERY DISEASE OF AUTOLOGOUS BYPASS GRAFT WITH STABLE ANGINA PECTORIS (H): ICD-10-CM

## 2023-04-20 PROCEDURE — 99310 SBSQ NF CARE HIGH MDM 45: CPT | Performed by: FAMILY MEDICINE

## 2023-04-20 NOTE — LETTER
"    4/20/2023        RE: Ever Crane  [address]  [address]  [facility] [address]        [facility]  [address] [place] [address]  Phone: [phone]  Fax: [phone]  Primary Provider: Adriano Dodd Physician  Pre-op Performing Provider: ANAM WILEY    :[id number]}  PREOPERATIVE EVALUATION:  Today's date: 4/20/2023    Ever Crane is a 78 year old male who presents for a preoperative evaluation.    Surgical Information:  Surgery/Procedure: Transmetatarsal amputation right foot with Achilles tendon lengthening, debridement of right heel ulceration  Surgery Location: [facility]   Surgeon: Miguelangel Spears DPM  Surgery Date: 4/27/23  Time of Surgery: 12:30 pm  Where patient plans to recover: At a nursing home  Fax number for surgical facility: Note does not need to be faxed, will be available electronically in Epic.    Subjective      HPI related to upcoming procedure:   - Do you have a history of heart attack, stroke, stent, bypass or surgery on an artery in the head, neck, heart or legs?ES     - Do you ever have any pain or discomfort in your chest? NO    - Do you have a history of  Heart Failure? NO     - Are you troubled by shortness of breath when: walking on the level, up a slight hill or at night? NO     - Do you currently have a cold, bronchitis or other respiratory infection?. NO     - Do you have a cough, shortness of breath or wheezing?\", COUGH WITH SLIGHT PHLEGM FOR THE LAST FOUR MONTH    - Do you sometimes get pains in the calves of your legs when you walk?\". NO     - Do you or anyone in your family have previous history of blood clots?  NO     - Do you or does anyone in your family have a serious bleeding problem such as prolonged bleeding following surgeries or cuts?. NO     - Have you ever had problems with anemia or been told to take iron pills? NO     - Have you had any abnormal blood loss such as black, tarry " "or bloody stools, or abnormal vaginal bleeding?\",\"12. NO     - Have you ever had a blood transfusion? NO     - Have you or any of your relatives ever had problems with anesthesia? NO     - Do you have sleep apnea, excessive snoring or daytime drowsiness?NO     - Do you have any prosthetic heart valves? NO     - Do you have prosthetic joints?NO      Health Care Directive:  Patient has a Health Care Directive on file      Preoperative Review of :   reviewed - controlled substances reflected in medication list.      Status of Chronic Conditions:  # A-FIB   - Patient has a longstanding history of chronic A-fib currently NOT on  rate and rhythm control. \"Was on sotalol prior to CABG but not since. Even metoprolol was stopped and he was in sinus rhythm on Holter in Dec 2018.\"  - Current treatment regimen includes Rivaroxaban for stroke prevention and denies significant symptoms of lightheadedness, palpitations or dyspnea.     # ANEMIA - Patient has a recent history of moderate-severe anemia, which has not been symptomatic. Work up to date has revealed Hb at 11.7 gm/dl (4/12/23) Treatment has been:on B12 supplement.       # CHF / CAD  - -Patient has a longstanding history of moderate-severe CAD.   - Patient has a longstanding history of moderate-severe CHF. Exacerbating conditions include ischemic heart disease, hypertension and atrial fibrillation. - Currently the patient's condition is COMPENSATED.   - Current treatment regimen includes Angiotensin 2 receptor blocker, diuretic and spirolactone. The patient denies chest pain, edema, orthopnea, SOB or recent weight gain.    -Last Echocardiogram on 8/17/2019:  which revealed  Normal LVEF, normal LV size and systolic function. Mild basal inferolateral hypokinesis, abnormal left vent diastolic function, left trail volume is severely increased. Mild MR.    - Last EKG (5/12/22): NSR, minimal criteria for ant infarct  - ECG: SR with 1st degree AV block, vent asystole. "     # Query COPD:  -No PFT study in epic. Query presumed COPD..  -  Patient has been doing well overall noting. Has chronic cough. Continues on medication regimen consisting of Albuterol inhaler prn without adverse reactions or side effects.    # DIABETES :  - Patient has a longstanding history of Diabetes Type II .   - Patient is being treated with Glipizide and Victoza, and denies significant side effects.   - Control has been HbA1C 7.0%  - Complicating factors include but are not limited to: hypertension, hyperlipidemia and CAD/PVD.       # HYPERLIPIDEMIA:   - Patient has a long history of significant Hyperlipidemia requiring medication for treatment with recent good control. Last LDL in epic dates back to 2021 with LDL 33. On simvastatin 40 mg.   - Patient reports no problems or side effects with the medication.       # HYPERTENSION :  - Patient has longstanding history of HTN , currently denies any symptoms referable to elevated blood pressure. Specifically denies chest pain, palpitations, dyspnea, orthopnea, PND or peripheral edema.   - Blood pressure readings have been in normal range.   BP Readings from Last 3 Encounters:   04/20/23 120/74   04/06/23 131/62   04/05/23 110/60   - Current medication regimen is as listed below. Patient denies any side effects of medication.       # RENAL INSUFFICIENCY:   - Patient has a longstanding history of moderate-severe chronic renal insufficiency.   - Last Cr 1.8 mg/dl (4/12/23)    # SLEEP PROBLEM - NONE    Review of Systems  CONSTITUTIONAL: NEGATIVE for fever, chills, change in weight  INTEGUMENTARY/SKIN: NEGATIVE for worrisome rashes, moles or lesions  EYES: NEGATIVE for vision changes or irritation  ENT/MOUTH: NEGATIVE for ear, mouth and throat problems  RESP: NEGATIVE for significant cough or SOB  CV: NEGATIVE for chest pain, palpitations or peripheral edema  GI: NEGATIVE for nausea, abdominal pain, heartburn, or change in bowel habits  : NEGATIVE for frequency,  dysuria, or hematuria  MUSCULOSKELETAL: NEGATIVE for significant arthralgias or myalgia  NEURO: NEGATIVE for weakness, dizziness or paresthesias  ENDOCRINE: NEGATIVE for temperature intolerance, skin/hair changes  HEME: NEGATIVE for bleeding problems  PSYCHIATRIC: NEGATIVE for changes in mood or affect    Patient Active Problem List    Diagnosis Date Noted     Chronic systolic heart failure (H) 01/17/2023     Priority: Medium     Pressure ulcer of right heel, stage 3 (H) 01/13/2023     Priority: Medium     Pressure ulcer of left heel, stage 3 (H) 01/13/2023     Priority: Medium     Osteomyelitis of ankle and foot (H) 01/13/2023     Priority: Medium     PAD (peripheral artery disease) (H) 01/05/2023     Priority: Medium     Cellulitis and abscess of foot excluding toe 01/05/2023     Priority: Medium     Diabetic ulcer of toe of right foot associated with diabetes mellitus due to underlying condition, with necrosis of bone (H) 01/05/2023     Priority: Medium     Diabetic ulcer of toe of right foot associated with diabetes mellitus due to underlying condition, limited to breakdown of skin (H) 01/05/2023     Priority: Medium     Open wound of both legs with complication 12/28/2022     Priority: Medium     BPH (benign prostatic hyperplasia) 12/06/2022     Priority: Medium     Diarrhea 12/06/2022     Priority: Medium     DM type 2 with diabetic mixed hyperlipidemia (H) 12/06/2022     Priority: Medium     Rectal discharge 10/10/2022     Priority: Medium     Long term current use of anticoagulant therapy 06/07/2022     Priority: Medium     Hyperkalemia 06/07/2022     Priority: Medium     Acute diastolic heart failure (H) 06/07/2022     Priority: Medium     Polyneuropathy due to type 2 diabetes mellitus (H) 06/07/2022     Priority: Medium     Status post amputation of foot (H) 06/07/2022     Priority: Medium     Type 2 diabetes mellitus with other circulatory complication, with long-term current use of insulin (H) 05/25/2022      Priority: Medium     Closed nondisplaced intertrochanteric fracture of left femur with routine healing, subsequent encounter 05/18/2022     Priority: Medium     Coronary atherosclerosis 05/17/2022     Priority: Medium     Hyperlipidemia 05/17/2022     Priority: Medium     Formatting of this note might be different from the original.  Created by Conversion       Typical atrial flutter (H) 05/17/2022     Priority: Medium     Formatting of this note might be different from the original.  Created by Conversion       Acute posthemorrhagic anemia 05/17/2022     Priority: Medium     Chronic obstructive pulmonary disease, unspecified (H) 05/17/2022     Priority: Medium     Personal history of other diseases of the circulatory system 05/17/2022     Priority: Medium     Occlusion and stenosis of right carotid artery 05/17/2022     Priority: Medium     Lymphedema, not elsewhere classified 05/17/2022     Priority: Medium     Venous insufficiency (chronic) (peripheral) 05/17/2022     Priority: Medium     Essential hypertension 05/12/2022     Priority: Medium     Created by Conversion  Replacement Utility updated for latest IMO load         Ischemic cardiomyopathy 05/12/2022     Priority: Medium     Carotid stenosis, asymptomatic, right 05/12/2022     Priority: Medium     Chronic venous stasis dermatitis 05/12/2022     Priority: Medium     Persistent atrial fibrillation (H) 05/12/2022     Priority: Medium     Formatting of this note might be different from the original.  Was on sotalol prior to CABG but not since. Even metoprolol was stopped and he was in sinus rhythm on Holter in Dec 2018.    Formatting of this note might be different from the original.  Was on sotalol prior to CABG but not since. Even metoprolol was stopped and he was in sinus rhythm on Holter in Dec 2018.       Osteoarthrosis 05/09/2022     Priority: Medium     Neuropathy due to medical condition (H) 05/09/2022     Priority: Medium     Skin tear of lower  leg without complication 05/09/2022     Priority: Medium     Foot amputee (H) 04/11/2022     Priority: Medium     Coronary artery disease of autologous bypass graft with stable angina pectoris (H) 03/14/2022     Priority: Medium     Left groin pain 02/28/2022     Priority: Medium     Contact with and (suspected) exposure to covid-19 12/13/2021     Priority: Medium     Constipation, unspecified 10/18/2021     Priority: Medium     Hair loss 10/18/2021     Priority: Medium     Long term (current) use of oral hypoglycemic drugs 05/03/2021     Priority: Medium     Stage 3b chronic kidney disease (H) 11/12/2020     Priority: Medium     Cholecystitis 10/14/2019     Priority: Medium     Clostridium difficile colitis 10/14/2019     Priority: Medium     Mild cognitive impairment 10/14/2019     Priority: Medium     Unspecified open wound, left lower leg, initial encounter 10/14/2019     Priority: Medium     Presence of aortocoronary bypass graft 10/14/2019     Priority: Medium     Fever 06/28/2018     Priority: Medium     Pneumonia 06/26/2018     Priority: Medium     Medically noncompliant 06/20/2018     Priority: Medium     Heart failure with preserved ejection fraction, NYHA class II (H) 01/25/2018     Priority: Medium     Peripheral vascular disease (H) 11/14/2017     Priority: Medium     Acquired lymphedema of lower extremity 10/16/2017     Priority: Medium     Polyneuropathy associated with underlying disease (H) 08/10/2017     Priority: Medium     Venous hypertension, chronic, bilateral 08/10/2017     Priority: Medium     Mcgarry's esophagus 01/01/2012     Priority: Medium     Formatting of this note might be different from the original.  per note of Dr. Tavo Mike of [facility]        Past Medical History:   Diagnosis Date     A-fib (H)      MESHA (acute kidney injury) (H)      Atopic keratoconjunctivitis      Atrial fibrillation (H)     Brian Moe: 8/2011 Cardioversion; CHADS2 VASC = 5; he is on warfarin and sotalol       Atrial flutter (H)      Mcgarry's esophagus 1/1/2012    per note of Dr. Tavo Mike of [facility]     Bilateral lower leg cellulitis 8/31/2018     Candidiasis of perineum 1/3/2018     Carotid stenosis, asymptomatic, right      CHF (congestive heart failure) (H)      Cholecystitis, acute 8/18/2019     Closed nondisplaced intertrochanteric fracture of left femur with routine healing, subsequent encounter 5/18/2022     Coronary artery disease due to lipid rich plaque 2000    CABG x2     Diabetes (H)      Diabetic ulcer of both feet (H) 10/31/2017     Dyslexia      Dyslipidemia, goal LDL below 70 2000     Epistaxis      Essential hypertension      Gangrene of left foot (H)      GERD (gastroesophageal reflux disease)      HLD (hyperlipidemia)      HTN (hypertension)      Hyponatremia      MRSA (methicillin resistant Staphylococcus aureus)      Neuropathy      Non-STEMI (non-ST elevated myocardial infarction) (H)      Other atopic dermatitis      Peripheral vascular disease (H)      Pneumonia 6/26/2018     Type 2 diabetes mellitus, without long-term current use of insulin (H)      Unable to function independently 11/13/2017     Past Surgical History:   Procedure Laterality Date     AMPUTATE FOOT Left 11/05/2017    Procedure: LEFT TRANSMETATARSAL AMPUTATION;  Surgeon: Ever Wick MD;  Location: [facility] OR;  Service:      BYPASS GRAFT ARTERY CORONARY N/A 03/06/2000    SVG to OM1, SVG to PDA     BYPASS GRAFT ARTERY CORONARY N/A 10/04/2018    redo CABG due to graft failure     CABG MEASURES GRP       CARDIOVERSION  08/25/2011     CV CORONARY ANGIOGRAM N/A 10/01/2018    Procedure: Coronary Angiogram;  Surgeon: Miki Mac MD;  Location: [facility] Cath Lab;  Service:      INGUINAL HERNIA REPAIR Bilateral 1969    and 1979     IR EXTREMITY ANGIOGRAM BILATERAL  11/3/2017     IR LOWER EXTREMITY ANGIOGRAM LEFT  3/10/2023     IR LOWER EXTREMITY ANGIOGRAM RIGHT  1/24/2023     LAPAROSCOPIC CHOLECYSTECTOMY N/A 08/18/2019     Procedure: CHOLECYSTECTOMY, LAPAROSCOPIC;  Surgeon: Stewart Fountain MD;  Location: [facility];  Service: General     Current Outpatient Medications   Medication Sig Dispense Refill     acetaminophen (TYLENOL) 325 MG tablet 1000 mg tid scheduled and 500 mg bid prn       albuterol (PROAIR HFA/PROVENTIL HFA/VENTOLIN HFA) 108 (90 Base) MCG/ACT inhaler Inhale 2 puffs into the lungs 2 times daily And q4h PRN       celecoxib (CELEBREX) 100 MG capsule Take 100 mg by mouth daily as needed       cetirizine (ZYRTEC) 10 MG tablet Take 10 mg by mouth daily       clopidogrel (PLAVIX) 75 MG tablet Take 1 tablet (75 mg) by mouth daily Start taking medication the day after the procedure. 90 tablet 1     cyanocobalamin (VITAMIN B-12) 2500 MCG SUBL sublingual tablet Place 2,500 mcg under the tongue daily       furosemide (LASIX) 40 MG tablet Take 40 mg by mouth daily       gabapentin (NEURONTIN) 100 MG capsule Take 100 mg by mouth 2 times daily       glipiZIDE (GLUCOTROL) 10 MG tablet Take 10 mg by mouth 2 times daily (before meals)       ketoconazole (NIZORAL) 2 % external shampoo Apply topically twice a week Mon and Fri.       liraglutide (VICTOZA) 18 MG/3ML solution Inject 0.6 mg Subcutaneous daily       losartan (COZAAR) 25 MG tablet Take 25 mg by mouth daily       mineral oil-hydrophilic petrolatum (AQUAPHOR) external ointment Apply topically 2 times daily       mineral oil-hydrophilic petrolatum (AQUAPHOR) external ointment Apply topically 2 times daily       nitroGLYcerin (NITROSTAT) 0.4 MG sublingual tablet Place 0.4 mg under the tongue every 5 minutes as needed       omeprazole (PRILOSEC) 20 MG CR capsule Take 20 mg by mouth daily       oxyCODONE (ROXICODONE) 5 MG tablet Take 0.5 tablets (2.5 mg) by mouth every 6 hours as needed for pain 24 tablet 0     polyethylene glycol (MIRALAX) 17 g packet Take 1 packet by mouth daily       rivaroxaban ANTICOAGULANT (XARELTO) 15 MG TABS tablet Take by mouth daily (with  "dinner)       senna-docusate (SENOKOT-S/PERICOLACE) 8.6-50 MG tablet Take 1 tablet by mouth 2 times daily And BID PRN       simvastatin (ZOCOR) 40 MG tablet Take 40 mg by mouth At Bedtime       spironolactone (ALDACTONE) 25 MG tablet Take 25 mg by mouth daily       tamsulosin (FLOMAX) 0.4 MG capsule Take 0.4 mg by mouth daily         Allergies   Allergen Reactions     Doxycycline Rash     Latex Rash     Penicillin V Rash     Reaction occurred as a child. Patient tolerated Zosyn 2018, Cefazolin 10/2018, and has also tolerated Augmentin.     Penicillins Rash     Reaction occurred as a child. Patient tolerated Zosyn 2018, Cefazolin 10/2018, and has also tolerated Augmentin.        Social History     Tobacco Use     Smoking status: Former     Packs/day: 1.00     Years: 36.00     Pack years: 36.00     Types: Cigarettes     Start date: 1964     Quit date: 2000     Years since quittin.9     Smokeless tobacco: Never   Vaping Use     Vaping status: Not on file   Substance Use Topics     Alcohol use: Yes     Alcohol/week: 5.0 standard drinks of alcohol     Types: 1 Cans of beer per week     Family History   Problem Relation Age of Onset     Sudden Death Mother 85.00     CABG Father      Valvular heart disease Father      Esophageal Cancer Father 58.00        cause of death     No Known Problems Son      No Known Problems Sister      Obesity Sister      Osteoarthritis Sister      No Known Problems Sister      No Known Problems Grandchild      No Known Problems Grandchild      History   Drug Use No        Objective     /74   Pulse 86   Temp 97.9  F (36.6  C)   Resp 18   Ht 1.778 m (5' 10\")   Wt 84.4 kg (186 lb)   SpO2 96%   BMI 26.69 kg/m      Physical Exam    GENERAL APPEARANCE: healthy, alert and no distress     EYES: EOMI,  PERRL     HENT: ear canals and TM's normal and nose and mouth without ulcers or lesions     NECK: no adenopathy, no asymmetry, masses, or scars and thyroid normal to " palpation     RESP: lungs clear to auscultation - no rales, rhonchi or wheezes     CV: regular rates and rhythm, normal S1 S2, no S3 or S4 and no murmur, click or rub     ABDOMEN:  soft, nontender, no HSM or masses and bowel sounds normal     MS: extremities normal- no gross deformities noted, no evidence of inflammation in joints, FROM in all extremities.     SKIN: no suspicious lesions or rashes     NEURO: Normal strength and tone, sensory exam grossly normal, mentation intact and speech normal     PSYCH: mentation appears normal. and affect normal/bright     LYMPHATICS: No cervical adenopathy    Recent Labs   Lab Test 04/12/23  0750 03/10/23  0853 01/24/23  0719 10/11/22  1333 07/12/22  0808 05/24/22  0752 10/21/21  1010   HGB 11.7* 11.3* 10.8*   < >  --    < > 14.3    269 174   < >  --    < > 182   INR  --   --  1.23*  --   --   --   --     137 139   < > 141   < > 138   POTASSIUM 4.8 5.1 5.5*   < > 4.6   < > 4.5   CR 1.28* 1.35* 1.93*   < > 1.32*   < > 1.29   A1C  --   --   --   --  7.0*  --  6.3*    < > = values in this interval not displayed.        Diagnostics:  Recent Results (from the past 720 hour(s))   CBC with platelets    Collection Time: 04/12/23  7:50 AM   Result Value Ref Range    WBC Count 9.7 4.0 - 11.0 10e3/uL    RBC Count 4.47 4.40 - 5.90 10e6/uL    Hemoglobin 11.7 (L) 13.3 - 17.7 g/dL    Hematocrit 38.5 (L) 40.0 - 53.0 %    MCV 86 78 - 100 fL    MCH 26.2 (L) 26.5 - 33.0 pg    MCHC 30.4 (L) 31.5 - 36.5 g/dL    RDW 15.0 10.0 - 15.0 %    Platelet Count 296 150 - 450 10e3/uL   Basic metabolic panel    Collection Time: 04/12/23  7:50 AM   Result Value Ref Range    Sodium 140 136 - 145 mmol/L    Potassium 4.8 3.4 - 5.3 mmol/L    Chloride 102 98 - 107 mmol/L    Carbon Dioxide (CO2) 26 22 - 29 mmol/L    Anion Gap 12 7 - 15 mmol/L    Urea Nitrogen 27.1 (H) 8.0 - 23.0 mg/dL    Creatinine 1.28 (H) 0.67 - 1.17 mg/dL    Calcium 9.8 8.8 - 10.2 mg/dL    Glucose 95 70 - 99 mg/dL    GFR Estimate 57  (L) >60 mL/min/1.73m2      Revised Cardiac Risk Index (RCRI):  The patient has the following serious cardiovascular risks for perioperative complications:   - Coronary Artery Disease (MI, positive stress test, angina, Qs on EKG) = 1 point     RCRI Interpretation: 1 point: Class II (low risk - 0.9% complication rate)         Assessment & Plan     The proposed surgical procedure is considered   INTERMEDIATE risk.    Problem List Items Addressed This Visit     Chronic obstructive pulmonary disease, unspecified (H)    Coronary artery disease of autologous bypass graft with stable angina pectoris (H)    Heart failure with preserved ejection fraction, NYHA class II (H)    PAD (peripheral artery disease) (H)    Persistent atrial fibrillation (H)    Polyneuropathy due to type 2 diabetes mellitus (H) - Primary    Stage 3b chronic kidney disease (H)    Type 2 diabetes mellitus with other circulatory complication, with long-term current use of insulin (H)       Risks and Recommendations:  The patient has the following additional risks and recommendations for perioperative complications:   - Consult Hospitalist / IM to assist with post-op medical management    Cardiovascular:   - ECG: pending result.   - No need for echo or stress test    Diabetes:  - Patient is not on insulin therapy: regular NPO guidelines can be followed.     Pulmonary:    - Incentive spirometry post-op   - Consider Respiratory Therapy (Respiratory Care IP Consult) post-op  - CXR: No focal opacity. Query bronchitis.    Anemia/Bleeding/Clotting:    - Anemia and does not require treatment prior to surgery. Monitor hemoglobin postoperatively    Infection:    - Patient has a history of MRSA    Medication Instructions:   - rivaroxaban (Xarelto): Neuraxial or regional block anticipated AND CrCl (>=) 50 mL/min. HOLD 3 days before surgery. No bridging is required. Risk of bleeding risk outweighs benefit     - clopidogrel (Plavix) No contraindication to stopping  Plavix, HOLD 5 days before surgery.      - ACE/ARB: HOLD due to exceptional risk of hypotension during surgery.    - Diuretics: May continue due to heart failure.   - Statins: Continue taking on the day of surgery.       -  glipizide: HOLD day of surgery   - Liraglutide HOLD day of surgery       - Opioids: Continue without modification.   - pregabalin, gabapentin: Continue without modification.      - celecoxib (Celebrex): HOLD 3 days before surgery.      - rescue Inhaler: Continue PRN. Bring to hospital on the day of surgery.     HOLD on AM of the surgery:  - cyanocobalamin  - cetrizine ( if given in am)    GIVE WITH SIP OF WATER AT LEAST 2 HOURS PRIOR TO SURGERY:  - omeprazole      APPROVAL GIVEN to proceed with proposed procedure, without further diagnostic evaluation.  5    Signed Electronically by: Isabel Stephens MD  Copy of this evaluation report is provided to requesting physician.        Sincerely,        Isabel Stephens MD

## 2023-04-27 ENCOUNTER — HOSPITAL ENCOUNTER (OUTPATIENT)
Facility: HOSPITAL | Age: 78
Discharge: HOME | End: 2023-04-27
Attending: PODIATRIST | Admitting: PODIATRIST
Payer: MEDICARE

## 2023-04-27 ENCOUNTER — ANESTHESIA EVENT (OUTPATIENT)
Dept: SURGERY | Facility: HOSPITAL | Age: 78
End: 2023-04-27
Payer: MEDICARE

## 2023-04-27 ENCOUNTER — ANESTHESIA (OUTPATIENT)
Dept: SURGERY | Facility: HOSPITAL | Age: 78
End: 2023-04-27
Payer: MEDICARE

## 2023-04-27 VITALS
OXYGEN SATURATION: 97 % | SYSTOLIC BLOOD PRESSURE: 126 MMHG | RESPIRATION RATE: 18 BRPM | HEIGHT: 70 IN | WEIGHT: 186 LBS | BODY MASS INDEX: 26.63 KG/M2 | HEART RATE: 60 BPM | TEMPERATURE: 98.8 F | DIASTOLIC BLOOD PRESSURE: 74 MMHG

## 2023-04-27 DIAGNOSIS — S81.802A UNSPECIFIED OPEN WOUND, LEFT LOWER LEG, INITIAL ENCOUNTER: Primary | ICD-10-CM

## 2023-04-27 DIAGNOSIS — M86.9 OSTEOMYELITIS OF ANKLE AND FOOT (H): ICD-10-CM

## 2023-04-27 LAB
GLUCOSE BLDC GLUCOMTR-MCNC: 137 MG/DL (ref 70–99)
GLUCOSE BLDC GLUCOMTR-MCNC: 95 MG/DL (ref 70–99)

## 2023-04-27 PROCEDURE — 360N000076 HC SURGERY LEVEL 3, PER MIN: Performed by: PODIATRIST

## 2023-04-27 PROCEDURE — 82962 GLUCOSE BLOOD TEST: CPT

## 2023-04-27 PROCEDURE — 250N000009 HC RX 250: Performed by: ANESTHESIOLOGY

## 2023-04-27 PROCEDURE — 710N000012 HC RECOVERY PHASE 2, PER MINUTE: Performed by: PODIATRIST

## 2023-04-27 PROCEDURE — 250N000011 HC RX IP 250 OP 636: Performed by: ANESTHESIOLOGY

## 2023-04-27 PROCEDURE — 272N000001 HC OR GENERAL SUPPLY STERILE: Performed by: PODIATRIST

## 2023-04-27 PROCEDURE — 87077 CULTURE AEROBIC IDENTIFY: CPT | Performed by: PODIATRIST

## 2023-04-27 PROCEDURE — 88307 TISSUE EXAM BY PATHOLOGIST: CPT | Mod: TC | Performed by: PODIATRIST

## 2023-04-27 PROCEDURE — 258N000001 HC RX 258: Performed by: PODIATRIST

## 2023-04-27 PROCEDURE — 88311 DECALCIFY TISSUE: CPT | Mod: TC | Performed by: PODIATRIST

## 2023-04-27 PROCEDURE — 11042 DBRDMT SUBQ TIS 1ST 20SQCM/<: CPT | Mod: 51 | Performed by: PODIATRIST

## 2023-04-27 PROCEDURE — 250N000013 HC RX MED GY IP 250 OP 250 PS 637: Performed by: ANESTHESIOLOGY

## 2023-04-27 PROCEDURE — 258N000003 HC RX IP 258 OP 636: Performed by: ANESTHESIOLOGY

## 2023-04-27 PROCEDURE — 27685 REVISION OF LOWER LEG TENDON: CPT | Mod: 51 | Performed by: PODIATRIST

## 2023-04-27 PROCEDURE — 370N000017 HC ANESTHESIA TECHNICAL FEE, PER MIN: Performed by: PODIATRIST

## 2023-04-27 PROCEDURE — 28805 AMPUTATION THRU METATARSAL: CPT | Mod: RT | Performed by: PODIATRIST

## 2023-04-27 PROCEDURE — 87205 SMEAR GRAM STAIN: CPT | Performed by: PODIATRIST

## 2023-04-27 PROCEDURE — 999N000141 HC STATISTIC PRE-PROCEDURE NURSING ASSESSMENT: Performed by: PODIATRIST

## 2023-04-27 RX ORDER — OXYCODONE HYDROCHLORIDE 5 MG/1
5-10 TABLET ORAL EVERY 6 HOURS PRN
Qty: 16 TABLET | Refills: 0 | Status: SHIPPED | OUTPATIENT
Start: 2023-04-27 | End: 2023-05-11

## 2023-04-27 RX ORDER — SODIUM CHLORIDE, SODIUM LACTATE, POTASSIUM CHLORIDE, CALCIUM CHLORIDE 600; 310; 30; 20 MG/100ML; MG/100ML; MG/100ML; MG/100ML
INJECTION, SOLUTION INTRAVENOUS CONTINUOUS
Status: DISCONTINUED | OUTPATIENT
Start: 2023-04-27 | End: 2023-04-27 | Stop reason: HOSPADM

## 2023-04-27 RX ORDER — CEFAZOLIN SODIUM/WATER 2 G/20 ML
SYRINGE (ML) INTRAVENOUS PRN
Status: DISCONTINUED | OUTPATIENT
Start: 2023-04-27 | End: 2023-04-27

## 2023-04-27 RX ORDER — FENTANYL CITRATE 50 UG/ML
25 INJECTION, SOLUTION INTRAMUSCULAR; INTRAVENOUS EVERY 5 MIN PRN
Status: DISCONTINUED | OUTPATIENT
Start: 2023-04-27 | End: 2023-04-27 | Stop reason: HOSPADM

## 2023-04-27 RX ORDER — ONDANSETRON 2 MG/ML
4 INJECTION INTRAMUSCULAR; INTRAVENOUS EVERY 30 MIN PRN
Status: DISCONTINUED | OUTPATIENT
Start: 2023-04-27 | End: 2023-04-27 | Stop reason: HOSPADM

## 2023-04-27 RX ORDER — NALOXONE HYDROCHLORIDE 0.4 MG/ML
0.4 INJECTION, SOLUTION INTRAMUSCULAR; INTRAVENOUS; SUBCUTANEOUS
Status: DISCONTINUED | OUTPATIENT
Start: 2023-04-27 | End: 2023-04-27 | Stop reason: HOSPADM

## 2023-04-27 RX ORDER — BUPIVACAINE HYDROCHLORIDE 5 MG/ML
INJECTION, SOLUTION EPIDURAL; INTRACAUDAL
Status: COMPLETED | OUTPATIENT
Start: 2023-04-27 | End: 2023-04-27

## 2023-04-27 RX ORDER — ONDANSETRON 4 MG/1
4 TABLET, ORALLY DISINTEGRATING ORAL EVERY 30 MIN PRN
Status: DISCONTINUED | OUTPATIENT
Start: 2023-04-27 | End: 2023-04-27 | Stop reason: HOSPADM

## 2023-04-27 RX ORDER — CLINDAMYCIN PHOSPHATE 900 MG/50ML
900 INJECTION, SOLUTION INTRAVENOUS
Status: DISCONTINUED | OUTPATIENT
Start: 2023-04-27 | End: 2023-04-27

## 2023-04-27 RX ORDER — CLINDAMYCIN PHOSPHATE 900 MG/50ML
900 INJECTION, SOLUTION INTRAVENOUS SEE ADMIN INSTRUCTIONS
Status: DISCONTINUED | OUTPATIENT
Start: 2023-04-27 | End: 2023-04-27

## 2023-04-27 RX ORDER — ACETAMINOPHEN 325 MG/1
975 TABLET ORAL ONCE
Status: COMPLETED | OUTPATIENT
Start: 2023-04-27 | End: 2023-04-27

## 2023-04-27 RX ORDER — LIDOCAINE 40 MG/G
CREAM TOPICAL
Status: DISCONTINUED | OUTPATIENT
Start: 2023-04-27 | End: 2023-04-27 | Stop reason: HOSPADM

## 2023-04-27 RX ORDER — NALOXONE HYDROCHLORIDE 0.4 MG/ML
0.2 INJECTION, SOLUTION INTRAMUSCULAR; INTRAVENOUS; SUBCUTANEOUS
Status: DISCONTINUED | OUTPATIENT
Start: 2023-04-27 | End: 2023-04-27 | Stop reason: HOSPADM

## 2023-04-27 RX ORDER — HYDROMORPHONE HYDROCHLORIDE 1 MG/ML
0.2 INJECTION, SOLUTION INTRAMUSCULAR; INTRAVENOUS; SUBCUTANEOUS EVERY 5 MIN PRN
Status: DISCONTINUED | OUTPATIENT
Start: 2023-04-27 | End: 2023-04-27 | Stop reason: HOSPADM

## 2023-04-27 RX ORDER — PROPOFOL 10 MG/ML
INJECTION, EMULSION INTRAVENOUS CONTINUOUS PRN
Status: DISCONTINUED | OUTPATIENT
Start: 2023-04-27 | End: 2023-04-27

## 2023-04-27 RX ORDER — FENTANYL CITRATE 50 UG/ML
50 INJECTION, SOLUTION INTRAMUSCULAR; INTRAVENOUS EVERY 5 MIN PRN
Status: DISCONTINUED | OUTPATIENT
Start: 2023-04-27 | End: 2023-04-27 | Stop reason: HOSPADM

## 2023-04-27 RX ORDER — CEFAZOLIN SODIUM/WATER 2 G/20 ML
SYRINGE (ML) INTRAVENOUS
Status: DISCONTINUED
Start: 2023-04-27 | End: 2023-04-27 | Stop reason: HOSPADM

## 2023-04-27 RX ORDER — HYDROMORPHONE HYDROCHLORIDE 1 MG/ML
0.4 INJECTION, SOLUTION INTRAMUSCULAR; INTRAVENOUS; SUBCUTANEOUS EVERY 5 MIN PRN
Status: DISCONTINUED | OUTPATIENT
Start: 2023-04-27 | End: 2023-04-27 | Stop reason: HOSPADM

## 2023-04-27 RX ORDER — FENTANYL CITRATE 50 UG/ML
25-100 INJECTION, SOLUTION INTRAMUSCULAR; INTRAVENOUS
Status: DISCONTINUED | OUTPATIENT
Start: 2023-04-27 | End: 2023-04-27 | Stop reason: HOSPADM

## 2023-04-27 RX ORDER — PROPOFOL 10 MG/ML
INJECTION, EMULSION INTRAVENOUS PRN
Status: DISCONTINUED | OUTPATIENT
Start: 2023-04-27 | End: 2023-04-27

## 2023-04-27 RX ADMIN — PHENYLEPHRINE HYDROCHLORIDE 50 MCG: 10 INJECTION INTRAVENOUS at 16:14

## 2023-04-27 RX ADMIN — PHENYLEPHRINE HYDROCHLORIDE 100 MCG: 10 INJECTION INTRAVENOUS at 16:30

## 2023-04-27 RX ADMIN — ACETAMINOPHEN 975 MG: 325 TABLET ORAL at 14:38

## 2023-04-27 RX ADMIN — PHENYLEPHRINE HYDROCHLORIDE 50 MCG: 10 INJECTION INTRAVENOUS at 16:08

## 2023-04-27 RX ADMIN — BUPIVACAINE HYDROCHLORIDE 24 ML: 5 INJECTION, SOLUTION EPIDURAL; INTRACAUDAL at 14:50

## 2023-04-27 RX ADMIN — PHENYLEPHRINE HYDROCHLORIDE 50 MCG: 10 INJECTION INTRAVENOUS at 16:04

## 2023-04-27 RX ADMIN — LIDOCAINE HYDROCHLORIDE 2 ML: 10 INJECTION, SOLUTION EPIDURAL; INFILTRATION; INTRACAUDAL; PERINEURAL at 15:50

## 2023-04-27 RX ADMIN — PHENYLEPHRINE HYDROCHLORIDE 100 MCG: 10 INJECTION INTRAVENOUS at 16:28

## 2023-04-27 RX ADMIN — Medication 2 G: at 15:54

## 2023-04-27 RX ADMIN — PROPOFOL 50 MG: 10 INJECTION, EMULSION INTRAVENOUS at 15:50

## 2023-04-27 RX ADMIN — BUPIVACAINE HYDROCHLORIDE 10 ML: 5 INJECTION, SOLUTION EPIDURAL; INTRACAUDAL; PERINEURAL at 14:48

## 2023-04-27 RX ADMIN — FENTANYL CITRATE 50 MCG: 50 INJECTION, SOLUTION INTRAMUSCULAR; INTRAVENOUS at 14:48

## 2023-04-27 RX ADMIN — PROPOFOL 100 MCG/KG/MIN: 10 INJECTION, EMULSION INTRAVENOUS at 15:50

## 2023-04-27 RX ADMIN — FENTANYL CITRATE 50 MCG: 50 INJECTION, SOLUTION INTRAMUSCULAR; INTRAVENOUS at 14:46

## 2023-04-27 RX ADMIN — PHENYLEPHRINE HYDROCHLORIDE 100 MCG: 10 INJECTION INTRAVENOUS at 16:34

## 2023-04-27 RX ADMIN — SODIUM CHLORIDE, POTASSIUM CHLORIDE, SODIUM LACTATE AND CALCIUM CHLORIDE: 600; 310; 30; 20 INJECTION, SOLUTION INTRAVENOUS at 14:40

## 2023-04-27 ASSESSMENT — ACTIVITIES OF DAILY LIVING (ADL)
ADLS_ACUITY_SCORE: 35
ADLS_ACUITY_SCORE: 22

## 2023-04-27 ASSESSMENT — COPD QUESTIONNAIRES: COPD: 1

## 2023-04-27 ASSESSMENT — ENCOUNTER SYMPTOMS: DYSRHYTHMIAS: 1

## 2023-04-27 ASSESSMENT — LIFESTYLE VARIABLES: TOBACCO_USE: 1

## 2023-04-27 NOTE — ANESTHESIA PREPROCEDURE EVALUATION
Anesthesia Pre-Procedure Evaluation    Patient: Ever Crane   MRN: [id number] : 1945        Procedure : Procedure(s):  Transmetatarsal amputation right foot  with Achilles tendon lengthening, debridement of right heel ulceration.          Past Medical History:   Diagnosis Date     A-fib (H)      MESHA (acute kidney injury) (H)      Anemia      Atopic keratoconjunctivitis      Atrial fibrillation (H)     Brian Moe: 2011 Cardioversion; CHADS2 VASC = 5; he is on warfarin and sotalol      Atrial flutter (H)      Mcgarry's esophagus 2012    per note of Dr. Tavo Mike of [facility]     Bilateral lower leg cellulitis 2018     BPH (benign prostatic hyperplasia)      Candidiasis of perineum 2018     Carotid stenosis      Carotid stenosis, asymptomatic, right      CHF (congestive heart failure) (H)      Cholecystitis, acute 2019     Chronic systolic heart failure (H)      Chronic venous stasis dermatitis      Closed nondisplaced intertrochanteric fracture of left femur with routine healing, subsequent encounter 2022     Clostridium difficile colitis      COPD (chronic obstructive pulmonary disease) (H)      Coronary artery disease due to lipid rich plaque     CABG x2     Diabetes (H)      Diabetic ulcer of both feet (H) 10/31/2017     Dyslexia      Dyslipidemia, goal LDL below 70      Epistaxis      Essential hypertension      Gangrene of left foot (H)      GERD (gastroesophageal reflux disease)      HLD (hyperlipidemia)      HTN (hypertension)      Hyponatremia      Ischemic heart disease      Lymphedema      Mild cognitive impairment      MRSA (methicillin resistant Staphylococcus aureus)      Neuropathy      Non-STEMI (non-ST elevated myocardial infarction) (H)      Occlusion and stenosis of right carotid artery      Osteoarthrosis      Osteomyelitis of ankle and foot (H)      Other atopic dermatitis      PAD (peripheral artery disease) (H)      Peripheral vascular disease (H)       Pneumonia 2018     Polyneuropathy due to type 2 diabetes mellitus (H)      Pressure ulcer, heel     Bilateral     Renal insufficiency      Type 2 diabetes mellitus, without long-term current use of insulin (H)      Unable to function independently 2017      Past Surgical History:   Procedure Laterality Date     AMPUTATE FOOT Left 2017    Procedure: LEFT TRANSMETATARSAL AMPUTATION;  Surgeon: Ever Wick MD;  Location: [facility];  Service:      BYPASS GRAFT ARTERY CORONARY N/A 2000    SVG to OM1, SVG to PDA     BYPASS GRAFT ARTERY CORONARY N/A 10/04/2018    redo CABG due to graft failure     CABG MEASURES GRP       CARDIOVERSION  2011     CV CORONARY ANGIOGRAM N/A 10/01/2018    Procedure: Coronary Angiogram;  Surgeon: Miki Mac MD;  Location: [facility];  Service:      INGUINAL HERNIA REPAIR Bilateral     and      IR EXTREMITY ANGIOGRAM BILATERAL  11/3/2017     IR LOWER EXTREMITY ANGIOGRAM LEFT  3/10/2023     IR LOWER EXTREMITY ANGIOGRAM RIGHT  2023     LAPAROSCOPIC CHOLECYSTECTOMY N/A 2019    Procedure: CHOLECYSTECTOMY, LAPAROSCOPIC;  Surgeon: Stewart Fountain MD;  Location: [facility];  Service: General      Allergies   Allergen Reactions     Doxycycline Rash     Latex Rash     Penicillin V Rash     Reaction occurred as a child. Patient tolerated Zosyn 2018, Cefazolin 10/2018, and has also tolerated Augmentin.     Penicillins Rash     Reaction occurred as a child. Patient tolerated Zosyn 2018, Cefazolin 10/2018, and has also tolerated Augmentin.      Social History     Tobacco Use     Smoking status: Former     Packs/day: 1.00     Years: 36.00     Pack years: 36.00     Types: Cigarettes     Start date: 1964     Quit date: 2000     Years since quittin.0     Smokeless tobacco: Never   Vaping Use     Vaping status: Not on file   Substance Use Topics     Alcohol use: Yes     Alcohol/week: 5.0 standard drinks  of alcohol     Types: 1 Cans of beer per week      Wt Readings from Last 1 Encounters:   04/27/23 84.4 kg (186 lb)        Anesthesia Evaluation   Pt has had prior anesthetic.     No history of anesthetic complications       ROS/MED HX  ENT/Pulmonary:     (+) tobacco use, Past use, COPD,     Neurologic:       Cardiovascular: Comment: 8/17/2019:  Addenda    When compared to the previous study dated 10/4/2018, no significant change.    Left ventricle ejection fraction is normal. The calculated left ventricular ejection fraction is 60%. Mild basal inferolateral hypokinesis.    Normal left ventricular size and systolic function.    Abnormal left ventricular diastolic function.    Left atrial volume is severely increased.    Aortic Valve: The aortic valve is sclerotic without reduced excursion. Mild aortic regurgitation.    (+) Dyslipidemia hypertension--CAD -past MI CABG (2018)--CHF dysrhythmias, a-fib,     METS/Exercise Tolerance:     Hematologic:     (+) anemia,     Musculoskeletal:       GI/Hepatic:     (+) GERD,     Renal/Genitourinary:     (+) renal disease, type: CRI,     Endo:     (+) type II DM,     Psychiatric/Substance Use:       Infectious Disease:       Malignancy:       Other:            Physical Exam    Airway        Mallampati: II   TM distance: > 3 FB   Neck ROM: full   Mouth opening: > 3 cm    Respiratory Devices and Support         Dental     Comment: Fair dentition, no loose or removable teeth        Cardiovascular          Rhythm and rate: regular and normal     Pulmonary           breath sounds clear to auscultation           OUTSIDE LABS:  CBC:   Lab Results   Component Value Date    WBC 9.7 04/12/2023    WBC 10.9 03/10/2023    HGB 11.7 (L) 04/12/2023    HGB 11.3 (L) 03/10/2023    HCT 38.5 (L) 04/12/2023    HCT 36.2 (L) 03/10/2023     04/12/2023     03/10/2023     BMP:   Lab Results   Component Value Date     04/12/2023     03/10/2023    POTASSIUM 4.8 04/12/2023     POTASSIUM 5.1 03/10/2023    CHLORIDE 102 04/12/2023    CHLORIDE 101 03/10/2023    CO2 26 04/12/2023    CO2 23 03/10/2023    BUN 27.1 (H) 04/12/2023    BUN 33.5 (H) 03/10/2023    CR 1.28 (H) 04/12/2023    CR 1.35 (H) 03/10/2023     (H) 04/27/2023    GLC 95 04/12/2023     COAGS:   Lab Results   Component Value Date    PTT 36 01/24/2023    INR 1.23 (H) 01/24/2023    FIBR 322 10/04/2018     POC:   Lab Results   Component Value Date     (H) 06/30/2018     HEPATIC:   Lab Results   Component Value Date    ALBUMIN 4.2 07/12/2022    PROTTOTAL 7.3 07/12/2022    ALT 8 (L) 07/12/2022    AST 18 07/12/2022    ALKPHOS 115 07/12/2022    BILITOTAL 0.4 07/12/2022     OTHER:   Lab Results   Component Value Date    PH 7.32 (L) 10/05/2018    LACT 1.6 01/17/2023    A1C 7.0 (H) 07/12/2022    MELODY 9.8 04/12/2023    PHOS 2.4 (L) 08/19/2019    MAG 1.9 08/19/2019    LIPASE 35 08/16/2019    TSH 3.20 07/12/2022    CRP 4.0 (H) 01/03/2019    SED 48 (H) 01/17/2023       Anesthesia Plan    ASA Status:  3   NPO Status:  NPO Appropriate    Anesthesia Type: General.     - Airway: Mask Only              Consents    Anesthesia Plan(s) and associated risks, benefits, and realistic alternatives discussed. Questions answered and patient/representative(s) expressed understanding.    - Discussed:     - Discussed with:  Patient      - Extended Intubation/Ventilatory Support Discussed: No.      - Patient is DNR/DNI Status: No    Use of blood products discussed: No .     Postoperative Care    Pain management: Peripheral nerve block (Single Shot).        Comments:    Other Comments: Single shot sciatic and saphenous nerve block             Christal Banegas MD

## 2023-04-27 NOTE — ANESTHESIA POSTPROCEDURE EVALUATION
Patient: Ever Crane    Procedure: Procedure(s):  Transmetatarsal amputation right foot  with Achilles tendon lengthening, debridement of right heel ulceration.       Anesthesia Type:  General    Note:  Disposition: Outpatient   Postop Pain Control: Uneventful            Sign Out: Well controlled pain   PONV: No   Neuro/Psych: Uneventful            Sign Out: Acceptable/Baseline neuro status   Airway/Respiratory: Uneventful            Sign Out: Acceptable/Baseline resp. status   CV/Hemodynamics: Uneventful            Sign Out: Acceptable CV status; No obvious hypovolemia; No obvious fluid overload   Other NRE: NONE   DID A NON-ROUTINE EVENT OCCUR? No           Last vitals:  Vitals Value Taken Time   /55 04/27/23 1700   Temp 37.3  C (99.1  F) 04/27/23 1646   Pulse 60 04/27/23 1708   Resp 16 04/27/23 1708   SpO2 99 % 04/27/23 1708   Vitals shown include unvalidated device data.    Electronically Signed By: Tato Lamar MD  April 27, 2023  5:09 PM

## 2023-04-27 NOTE — ANESTHESIA PROCEDURE NOTES
Popliteal Procedure Note    Pre-Procedure   Staff -        Anesthesiologist:  Christal Banegas MD       Performed By: anesthesiologist       Location: pre-op       Procedure Start/Stop Times: 4/27/2023 2:50 PM and 4/27/2023 2:55 PM       Pre-Anesthestic Checklist: patient identified, IV checked, site marked, risks and benefits discussed, informed consent, monitors and equipment checked, pre-op evaluation, at physician/surgeon's request and post-op pain management  Timeout:       Correct Patient: Yes        Correct Procedure: Yes        Correct Site: Yes        Correct Position: Yes        Correct Laterality: Yes        Site Marked: Yes  Procedure Documentation  Procedure: Popliteal       Laterality: right       Patient Position: supine (Leg elevated on pedestal)       Patient Prep/Sterile Barriers: sterile gloves, mask       Skin prep: Chloraprep       Needle Type: other (Stimuplex)       Needle Gauge: 20.        Needle Length (Inches): 4        Ultrasound guided       1. Ultrasound was used to identify targeted nerve, plexus, vascular marker, or fascial plane and place a needle adjacent to it in real-time.       2. Ultrasound was used to visualize the spread of anesthetic in close proximity to the above referenced structure.       3. A permanent image is entered into the patient's record.       4. The visualized anatomic structures appeared normal.       5. There were no apparent abnormal pathologic findings.    Assessment/Narrative         The placement was negative for: blood aspirated, painful injection and site bleeding       Paresthesias: No.       Bolus given via needle..        Secured via.        Insertion/Infusion Method: Single Shot       Complications: none       Injection made incrementally with aspirations every 5 mL.    Medication(s) Administered   Bupivacaine 0.5% PF (Infiltration) - Infiltration   24 mL - 4/27/2023 2:50:00 PM  Medication Administration Time: 4/27/2023 2:50 PM      FOR [facility]  "([facility]) ONLY:   Pain Team Contact information: please page the Pain Team Via [employer]. Search \"Pain\". During daytime hours, please page the attending first. At night please page the resident first.      "

## 2023-04-27 NOTE — PROGRESS NOTES
Per MARION Rubalcava at [facility] patient was last given Xarelto on 4/23/23 at 2000 , Plavix on 4/22/23 at 2000, and Tylenol 4/27/23 at 0830.

## 2023-04-27 NOTE — INTERVAL H&P NOTE
"I have reviewed the surgical (or preoperative) H&P that is linked to this encounter, and examined the patient. There are no significant changes    Clinical Conditions Present on Arrival:  Clinically Significant Risk Factors Present on Admission                # Drug Induced Coagulation Defect: home medication list includes an anticoagulant medication  # Drug Induced Platelet Defect: home medication list includes an antiplatelet medication  # Overweight: Estimated body mass index is 26.69 kg/m  as calculated from the following:    Height as of this encounter: 1.778 m (5' 10\").    Weight as of this encounter: 84.4 kg (186 lb).       "

## 2023-04-27 NOTE — ANESTHESIA PROCEDURE NOTES
Adductor canal Procedure Note    Pre-Procedure   Staff -        Anesthesiologist:  Christal Banegas MD       Performed By: anesthesiologist       Location: pre-op       Procedure Start/Stop Times: 4/27/2023 2:48 PM and 4/27/2023 2:50 PM       Pre-Anesthestic Checklist: patient identified, IV checked, site marked, risks and benefits discussed, informed consent, monitors and equipment checked, pre-op evaluation, at physician/surgeon's request and post-op pain management  Timeout:       Correct Patient: Yes        Correct Procedure: Yes        Correct Site: Yes        Correct Position: Yes        Correct Laterality: Yes        Site Marked: Yes  Procedure Documentation  Procedure: Adductor canal       Laterality: right       Patient Position: supine       Patient Prep/Sterile Barriers: sterile gloves, mask       Skin prep: Chloraprep       Needle Type: other (Stimuplex)       Needle Gauge: 20.        Needle Length (Inches): 4        Ultrasound guided       1. Ultrasound was used to identify targeted nerve, plexus, vascular marker, or fascial plane and place a needle adjacent to it in real-time.       2. Ultrasound was used to visualize the spread of anesthetic in close proximity to the above referenced structure.       3. A permanent image is entered into the patient's record.       4. The visualized anatomic structures appeared normal.       5. There were no apparent abnormal pathologic findings.    Assessment/Narrative         The placement was negative for: blood aspirated, painful injection and site bleeding       Paresthesias: No.       Bolus given via needle. no blood aspirated via catheter.        Secured via.        Insertion/Infusion Method: Single Shot       Complications: none    Medication(s) Administered   Bupivacaine 0.5% PF (Infiltration) - Infiltration   10 mL - 4/27/2023 2:48:00 PM  Medication Administration Time: 4/27/2023 2:48 PM      FOR [facility] ([facility] ONLY:   Pain Team Contact  TRANSFER - OUT REPORT:    Verbal report given to Guero Herrera RN on Abran Van  being transferred to [facility] for routine progression of patient care       Report consisted of patient's Situation, Background, Assessment and   Recommendations(SBAR). Information from the following report(s) Nurse Handoff Report was reviewed with the receiving nurse.     [facility] with Dr Juana Gallo  No interventions  R Radial  TR band at 12mL  Versed 2mg  Heparin 5000 units "information: please page the Pain Team Via [facility]. Search \"Pain\". During daytime hours, please page the attending first. At night please page the resident first.      "

## 2023-04-27 NOTE — ANESTHESIA CARE TRANSFER NOTE
Patient: Ever Crane    Procedure: Procedure(s):  Transmetatarsal amputation right foot  with Achilles tendon lengthening, debridement of right heel ulceration.       Diagnosis: Osteomyelitis of ankle and foot (H) [M86.9]  Diagnosis Additional Information: No value filed.    Anesthesia Type:   General     Note:    Oropharynx: spontaneously breathing and oropharynx clear of all foreign objects  Level of Consciousness: drowsy  Oxygen Supplementation: nasal cannula  Level of Supplemental Oxygen (L/min / FiO2): 2  Independent Airway: airway patency satisfactory and stable  Dentition: dentition unchanged  Vital Signs Stable: post-procedure vital signs reviewed and stable  Report to RN Given: handoff report given  Patient transferred to: Phase II    Handoff Report: Identifed the Patient, Identified the Reponsible Provider, Reviewed the pertinent medical history, Discussed the surgical course, Reviewed Intra-OP anesthesia mangement and issues during anesthesia, Set expectations for post-procedure period and Allowed opportunity for questions and acknowledgement of understanding      Vitals:  Vitals Value Taken Time   /55 04/27/23 1646   Temp     Pulse 62 04/27/23 1647   Resp 16 04/27/23 1647   SpO2 98 % 04/27/23 1647   Vitals shown include unvalidated device data.    Electronically Signed By: KEVIN Wells CRNA  April 27, 2023  4:49 PM

## 2023-04-27 NOTE — OP NOTE
Date:  4/27/2023    Surgeon: ANDRES Spears DPM    Preoperative diagnosis:   1. Osteomyelitis right foot   2. Equinus right foot  3.  Ulceration right foot    Postoperative diagnosis: Same    Procedure:   1. Transmetatarsal amputation right foot  2. Achilles tendon lengthening right  3.  Excisional debridement ulceration right heel into subcutaneous tissue    Anesthesia: Popliteal with MAC    Hemostasis: Pneumatic leg tourniquet 250 mmHg    Pathology: Bone/soft tissue, Aerobic/anaerobic culture    Injectables: None    Materials: 2-0 Vicryl, 2-0 nylon    Complications: None    Blood loss: 8 cc       Findings: Patient presents for operative intervention for osteomyelitis of the right foot.  I discussed today surgery with the patient to include a transmetatarsal amputation on the right foot with Achilles tendon lengthening.  Risk include but not limited to need for additional surgical intervention or loss of limb.  All question invited and answered.  Consent has been obtained.  Conservative measures were attempted and exhausted. Patient questions invited and answered, including appropriate risk, benefits and complications. No guarantees given or implied. Patient has been NPO.    Description: Patient was brought to the operating room and placed on the table in supine position. Popliteal block and MAC anesthesia was administered by the anesthesia department. The foot and leg were then prepped and draped in usual aseptic manner. The extremity was elevated and exsanguinated. Well-padded leg pneumatic tourniquet was inflated to 250 mmHg and the following procedure was then performed: Attention was directed to the posterior distal achilles tendon where 3 alternating medial/lateral incisions were made full thickness into the achilles tendon. Each incision was started half of the tendon width and carried to the appropriate laterality. A dorsiflexory force was applied to the left foot with increased dorsiflexion noted. 3-0 nylon  was used to re-approximate the skin in a simple suture fashion.    Attention was then directed to the distal aspect of the right foot where an incision was made into subcutaneus tissue raising dorsal and plantar skin flaps. Careful retraction of both the dorsal and plantar skin flaps was performed exposing the central metatarsals. A sagittal saw was then used to resect the metatarsals at approximately mid shaft. The specimen was removed from the surgical site in toto and sent to pathology. Deep aerobic and anaerobic cultures taken. Next, a 1000cc pulse lavage was used to irrigate the surgical site. The subcutaneous tissue was reapproximated with 2-0 vicryl in a simple fashion and the skin was closed with staples.    Posterior right plantar heel ulceration measuring 11 x 6 x 0.3 cm.  Sharp, excisional debridement ulceration plantar right heel with a #10 blade into subcutaneous tissue dividing 66 cm .    Dressings consisted of adaptic, 4x4's, kerlix/Nieves roll. The pneumatic tourniquet was released.     The patient appeared to tolerate all the procedures and anesthesia well without apparent complications. Patient was transported from the operating room to the recovery room with vital signs stable and neurovascular status as it was pre-operatively to the right foot. Patient to be discharged home per anesthesia. Written and verbal homecare instructions given to remain non-weight bearing on the right foot, keep surgical dressing intact. Patient to follow up in office for post-operative management in one week.

## 2023-04-28 LAB
GRAM STAIN RESULT: NORMAL
GRAM STAIN RESULT: NORMAL

## 2023-05-01 ENCOUNTER — TELEPHONE (OUTPATIENT)
Dept: VASCULAR SURGERY | Facility: CLINIC | Age: 78
End: 2023-05-01

## 2023-05-01 ENCOUNTER — TRANSITIONAL CARE UNIT VISIT (OUTPATIENT)
Dept: GERIATRICS | Facility: CLINIC | Age: 78
End: 2023-05-01
Payer: MEDICARE

## 2023-05-01 VITALS
HEIGHT: 70 IN | WEIGHT: 184.1 LBS | OXYGEN SATURATION: 94 % | BODY MASS INDEX: 26.36 KG/M2 | DIASTOLIC BLOOD PRESSURE: 72 MMHG | SYSTOLIC BLOOD PRESSURE: 122 MMHG | TEMPERATURE: 97.8 F | RESPIRATION RATE: 16 BRPM | HEART RATE: 68 BPM

## 2023-05-01 DIAGNOSIS — Z98.890 STATUS POST DEBRIDEMENT: ICD-10-CM

## 2023-05-01 DIAGNOSIS — Z89.431 STATUS POST AMPUTATION OF RIGHT FOOT THROUGH METATARSAL BONE (H): Primary | ICD-10-CM

## 2023-05-01 DIAGNOSIS — Z79.4 TYPE 2 DIABETES MELLITUS WITH OTHER CIRCULATORY COMPLICATION, WITH LONG-TERM CURRENT USE OF INSULIN (H): ICD-10-CM

## 2023-05-01 DIAGNOSIS — I73.9 PAD (PERIPHERAL ARTERY DISEASE) (H): ICD-10-CM

## 2023-05-01 DIAGNOSIS — M86.9 OSTEOMYELITIS OF RIGHT FOOT, UNSPECIFIED TYPE (H): ICD-10-CM

## 2023-05-01 DIAGNOSIS — E11.42 POLYNEUROPATHY DUE TO TYPE 2 DIABETES MELLITUS (H): ICD-10-CM

## 2023-05-01 DIAGNOSIS — E11.59 TYPE 2 DIABETES MELLITUS WITH OTHER CIRCULATORY COMPLICATION, WITH LONG-TERM CURRENT USE OF INSULIN (H): ICD-10-CM

## 2023-05-01 DIAGNOSIS — M86.9 OSTEOMYELITIS OF ANKLE AND FOOT (H): Primary | ICD-10-CM

## 2023-05-01 LAB
BACTERIA WND CULT: ABNORMAL
BACTERIA WND CULT: ABNORMAL

## 2023-05-01 PROCEDURE — 99309 SBSQ NF CARE MODERATE MDM 30: CPT | Performed by: NURSE PRACTITIONER

## 2023-05-01 RX ORDER — SULFAMETHOXAZOLE/TRIMETHOPRIM 800-160 MG
1 TABLET ORAL 2 TIMES DAILY
Qty: 20 TABLET | Refills: 0 | Status: SHIPPED | OUTPATIENT
Start: 2023-05-01 | End: 2023-05-11

## 2023-05-01 NOTE — LETTER
5/1/2023        RE: Ever Crane  [address]  [address]  [facility] [address]        [facility] Follow-Up  PCP & CLINIC: [facility], [address], [address] [address]  Chief Complaint   Patient presents with     Hospital F/U   [place] MRN: [id number]. Place of Service where encounter took place:  [facility] [place] ([facility]) [[id number]] Ever Crane  is a 78 year old  (1945), admitted to the above facility from  [facility]. Hospital stay 4/27 only. Admitted to this facility for  rehab, medical management, and nursing care. HPI information obtained from: facility chart records, facility staff, patient report, [facility] chart review, and Care Everywhere [facility] chart review.     Brief Summary of Hospital Course: Ever presented to [facility] on 4/27 for a planned right transmetatarsal amputation of the right foot w/ achilles lengthening and debridement of right heel ulceration for known OM and equinus of right foot. He then returned to TCU post-op for ongoing rehab/recovery.     Updates since return to transitional care unit: Ever returned to TCU on 4/27 s/p the above hospitalization. Today, Ever says things are going well.  He does not have a lot of pain at all, and thinks that he is recovering from surgery without issue.  He does have a little bit of constipation, but he says that is not very new.  He is open to suggestions on managing constipation, but does not want to go overboard.  He denies any shortness of breath, fever, chills, headache, dizziness.  He knows he is not supposed to bear weight, but nursing reports he frequently does not abide by this.  His appetite is pretty good, and he denies any nausea or heartburn.  He thinks he is sleeping very well.    CODE STATUS/ADVANCE DIRECTIVES DISCUSSION: CPR/Full code . Patient's living condition: lives alone. ALLERGIES: Doxycycline, Latex, Penicillin v, and Penicillins PAST  MEDICAL HISTORY:  has a past medical history of A-fib (H), MESHA (acute kidney injury) (H), Anemia, Atopic keratoconjunctivitis, Atrial fibrillation (H), Atrial flutter (H), Mcgarry's esophagus (01/01/2012), Bilateral lower leg cellulitis (08/31/2018), BPH (benign prostatic hyperplasia), Candidiasis of perineum (01/03/2018), Carotid stenosis, Carotid stenosis, asymptomatic, right, CHF (congestive heart failure) (H), Cholecystitis, acute (08/18/2019), Chronic systolic heart failure (H), Chronic venous stasis dermatitis, Closed nondisplaced intertrochanteric fracture of left femur with routine healing, subsequent encounter (05/18/2022), Clostridium difficile colitis, COPD (chronic obstructive pulmonary disease) (H), Coronary artery disease due to lipid rich plaque (2000), Diabetes (H), Diabetic ulcer of both feet (H) (10/31/2017), Dyslexia, Dyslipidemia, goal LDL below 70 (2000), Epistaxis, Essential hypertension, Gangrene of left foot (H), GERD (gastroesophageal reflux disease), HLD (hyperlipidemia), HTN (hypertension), Hyponatremia, Ischemic heart disease, Lymphedema, Mild cognitive impairment, MRSA (methicillin resistant Staphylococcus aureus), Neuropathy, Non-STEMI (non-ST elevated myocardial infarction) (H), Occlusion and stenosis of right carotid artery, Osteoarthrosis, Osteomyelitis of ankle and foot (H), Other atopic dermatitis, PAD (peripheral artery disease) (H), Peripheral vascular disease (H), Pneumonia (06/26/2018), Polyneuropathy due to type 2 diabetes mellitus (H), Pressure ulcer, heel, Renal insufficiency, Type 2 diabetes mellitus, without long-term current use of insulin (H), and Unable to function independently (11/13/2017).    He has no past medical history of Complication of anesthesia or Malignant hyperthermia.. PAST SURGICAL HISTORY:   has a past surgical history that includes cabg measures grp; IR Extremity Angiogram Bilateral (11/3/2017); Bypass graft artery coronary (N/A, 03/06/2000);  Cardioversion (08/25/2011); Amputate foot (Left, 11/05/2017); Inguinal Hernia Repair (Bilateral, 1969); Cv Coronary Angiogram (N/A, 10/01/2018); Laparoscopic cholecystectomy (N/A, 08/18/2019); Bypass graft artery coronary (N/A, 10/04/2018); IR Lower Extremity Angiogram Right (1/24/2023); IR Lower Extremity Angiogram Left (3/10/2023); Amputate foot (Right, 4/27/2023); and Lengthen tendon achilles (Right, 4/27/2023).. FAMILY HISTORY: family history includes CABG in his father; Esophageal Cancer (age of onset: 58.00) in his father; No Known Problems in his grandchild, grandchild, sister, sister, and son; Obesity in his sister; Osteoarthritis in his sister; Sudden Death (age of onset: 85.00) in his mother; Valvular heart disease in his father.. SOCIAL HISTORY:   reports that he quit smoking about 23 years ago. His smoking use included cigarettes. He started smoking about 58 years ago. He has a 36.00 pack-year smoking history. He has never used smokeless tobacco. He reports current alcohol use of about 5.0 standard drinks of alcohol per week. He reports that he does not use drugs.  Post Discharge Medication Reconciliation Status: discharge medications reconciled and changed, per note/orders.  Current Outpatient Medications   Medication Sig Dispense Refill     acetaminophen (TYLENOL) 325 MG tablet 1000 mg tid scheduled and 500 mg bid prn       albuterol (PROAIR HFA/PROVENTIL HFA/VENTOLIN HFA) 108 (90 Base) MCG/ACT inhaler Inhale 2 puffs into the lungs 2 times daily And q4h PRN       cetirizine (ZYRTEC) 10 MG tablet Take 10 mg by mouth daily       clopidogrel (PLAVIX) 75 MG tablet Take 1 tablet (75 mg) by mouth daily Start taking medication the day after the procedure. 90 tablet 1     cyanocobalamin (VITAMIN B-12) 2500 MCG SUBL sublingual tablet Place 2,500 mcg under the tongue daily       furosemide (LASIX) 40 MG tablet Take 40 mg by mouth daily       gabapentin (NEURONTIN) 100 MG capsule Take 100 mg by mouth 2 times  "daily       glipiZIDE (GLUCOTROL) 10 MG tablet Take 10 mg by mouth 2 times daily (before meals)       ketoconazole (NIZORAL) 2 % external shampoo Apply topically twice a week Mon and Fri.       liraglutide (VICTOZA) 18 MG/3ML solution Inject 0.6 mg Subcutaneous daily       losartan (COZAAR) 25 MG tablet Take 25 mg by mouth daily       mineral oil-hydrophilic petrolatum (AQUAPHOR) external ointment Apply topically 2 times daily       mineral oil-hydrophilic petrolatum (AQUAPHOR) external ointment Apply topically 2 times daily       omeprazole (PRILOSEC) 20 MG CR capsule Take 20 mg by mouth daily       oxyCODONE (ROXICODONE) 5 MG tablet Take 1-2 tablets (5-10 mg) by mouth every 6 hours as needed for moderate to severe pain 16 tablet 0     polyethylene glycol (MIRALAX) 17 g packet Take 1 packet by mouth daily       rivaroxaban ANTICOAGULANT (XARELTO) 15 MG TABS tablet Take by mouth daily (with dinner)       senna-docusate (SENOKOT-S/PERICOLACE) 8.6-50 MG tablet Take 1 tablet by mouth 2 times daily And BID PRN       simvastatin (ZOCOR) 40 MG tablet Take 40 mg by mouth At Bedtime       spironolactone (ALDACTONE) 25 MG tablet Take 25 mg by mouth daily       sulfamethoxazole-trimethoprim (BACTRIM DS) 800-160 MG tablet Take 1 tablet by mouth 2 times daily 20 tablet 0     tamsulosin (FLOMAX) 0.4 MG capsule Take 0.4 mg by mouth daily       ROS: 10 point ROS of systems including Constitutional, Eyes, Respiratory, Cardiovascular, Gastroenterology, Genitourinary, Integumentary, Musculoskeletal, Psychiatric were all negative except for pertinent positives noted in my HPI.    Vitals: /72   Pulse 68   Temp 97.8  F (36.6  C)   Resp 16   Ht 1.778 m (5' 10\")   Wt 83.5 kg (184 lb 1.6 oz)   SpO2 94%   BMI 26.42 kg/m    Exam:  GENERAL APPEARANCE: Alert, in no distress, cooperative.   ENT: Mouth/posterior oropharynx intact w/ moist mucous membranes, hearing acuity [place].  EYES: EOM, conjunctivae, lids, pupils and irises normal, " PERRL2.   RESP: Respiratory effort fair, no respiratory distress, Lung sounds clear. On RA.   CV: Auscultation of heart reveals S1, S2, rate controlled and rhythm irregular, no murmur, no rub or gallop, Edema 0+ BLE. Peripheral pulses are 2+.  ABDOMEN: Normal bowel sounds, soft, non-tender abdomen, and no masses palpated.  SKIN: Inspection/Palpation of skin and subcutaneous tissue baseline w/ fragility. No wounds/rashes noted, except BLE wounds/surgical incisions are covered at this time.   NEURO: CN II-XII at patient's baseline, sensation baseline PPS.  PSYCH: Insight, judgement, and memory are impaired at baseline, affect and mood are happy/engaged.    Lab/Diagnostic data: Recent labs in [facility] reviewed by me today.     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 Ongoing.    Ever remains in contact precautions for MRSA, and given recent cultures from surgery, he has been started on Bactrim.  Appreciate surgical input in his care and he will follow-up with them in a couple weeks.    Provider reviewed records/notes and interpreted the results of several tests in [facility] and/or at the facility.    Blood glucose, and other vital signs appear to be well controlled at this time.  These underlying issues may affect his ability to heal.    Provider coordinated care with nursing, did report noncompliance with nonweightbearing status.  This will make it difficult for Ever to heal.    No recent B12 level, no macrocytosis, with pretty significant B12 supplementation.  Will obtain B12 level and postoperative blood work to ensure renal stability after Bactrim start, and hemoglobin given he is slightly anemic.      We will discontinue nitroglycerin, as this has not been used, and is available on standing house orders should chest pain arise.    For constipation, will start senna at low-dose on a scheduled basis.  Follow up w/in 1 week or as needed.    Orders:  1. CBC, BMP, B12 level x1 on 5/3. Dx: post-op labs.  2. Discontinue nitroglycerin (use CIERRA).   3. Senna S 1 tab PO BID. Dx: constipation.     Electronically signed by:  Dr. Yanna Dozier, APRN CNP DNP                          Sincerely,        Yanna Dozier, KEVIN CNP

## 2023-05-01 NOTE — TELEPHONE ENCOUNTER
Spoke with Trang and asked if dressing is leaking through. She stated it is all dried and no longer bleeding. Advised if leaking through to change the dressing using adaptic, 4x4 gauze, and secure with roll gauze. Told to encouraged pt to use his wheelchair at all times. She was understanding of the plan. Pt will follow up 5/5/23.

## 2023-05-01 NOTE — TELEPHONE ENCOUNTER
Caller: [place]    Provider: MD Miguelangel Spears    Detailed reason for call: Patient has quite a bit of bloody dry drainage on the bottom of the right foot, as well as wounds on the leg and heel; wondering if Dr. Spears would like patient's leg and foot dressings to remain in place until his appointment on Friday. Trang also wanted to report that patient has been non-compliant with non-weight bearing on his foot after surgery.     Best phone number to contact: [phone]    Best time to contact: Any    Ok to leave a detailed message: Yes    Ok to speak to authorized person if needed: N/A      (Noted to patient if reason is related to wound or incision, to please send a photo via email or [employer].)

## 2023-05-01 NOTE — PROGRESS NOTES
[facility] Follow-Up  PCP & CLINIC: [facility], [address] [place] [address] / [facility] [address]  Chief Complaint   Patient presents with     Hospital F/U   [place] MRN: [id number]. [facility] [4[facility]] Ever Crane  is a 78 year old  (1945), admitted to the above facility from  [facility]. Hospital stay 4/27 only. Admitted to this facility for  rehab, medical management, and nursing care. HPI information obtained from: facility chart records, facility staff, patient report, [facility] chart review, and Care Everywhere [facility] chart review.     Brief Summary of Hospital Course: Ever presented to [facility] on 4/27 for a planned right transmetatarsal amputation of the right foot w/ achilles lengthening and debridement of right heel ulceration for known OM and equinus of right foot. He then returned to TCU post-op for ongoing rehab/recovery.     Updates since return to transitional care unit: Ever returned to TCU on 4/27 s/p the above hospitalization. Today, Ever says things are going well.  He does not have a lot of pain at all, and thinks that he is recovering from surgery without issue.  He does have a little bit of constipation, but he says that is not very new.  He is open to suggestions on managing constipation, but does not want to go overboard.  He denies any shortness of breath, fever, chills, headache, dizziness.  He knows he is not supposed to bear weight, but nursing reports he frequently does not abide by this.  His appetite is pretty good, and he denies any nausea or heartburn.  He thinks he is sleeping very well.    CODE STATUS/ADVANCE DIRECTIVES DISCUSSION: CPR/Full code . Patient's living condition: lives alone. ALLERGIES: Doxycycline, Latex, Penicillin v, and Penicillins PAST MEDICAL HISTORY:  has a past medical history of A-fib (H), MESHA (acute kidney injury) (H), Anemia,  Atopic keratoconjunctivitis, Atrial fibrillation (H), Atrial flutter (H), Mcgarry's esophagus (01/01/2012), Bilateral lower leg cellulitis (08/31/2018), BPH (benign prostatic hyperplasia), Candidiasis of perineum (01/03/2018), Carotid stenosis, Carotid stenosis, asymptomatic, right, CHF (congestive heart failure) (H), Cholecystitis, acute (08/18/2019), Chronic systolic heart failure (H), Chronic venous stasis dermatitis, Closed nondisplaced intertrochanteric fracture of left femur with routine healing, subsequent encounter (05/18/2022), Clostridium difficile colitis, COPD (chronic obstructive pulmonary disease) (H), Coronary artery disease due to lipid rich plaque (2000), Diabetes (H), Diabetic ulcer of both feet (H) (10/31/2017), Dyslexia, Dyslipidemia, goal LDL below 70 (2000), Epistaxis, Essential hypertension, Gangrene of left foot (H), GERD (gastroesophageal reflux disease), HLD (hyperlipidemia), HTN (hypertension), Hyponatremia, Ischemic heart disease, Lymphedema, Mild cognitive impairment, MRSA (methicillin resistant Staphylococcus aureus), Neuropathy, Non-STEMI (non-ST elevated myocardial infarction) (H), Occlusion and stenosis of right carotid artery, Osteoarthrosis, Osteomyelitis of ankle and foot (H), Other atopic dermatitis, PAD (peripheral artery disease) (H), Peripheral vascular disease (H), Pneumonia (06/26/2018), Polyneuropathy due to type 2 diabetes mellitus (H), Pressure ulcer, heel, Renal insufficiency, Type 2 diabetes mellitus, without long-term current use of insulin (H), and Unable to function independently (11/13/2017).    He has no past medical history of Complication of anesthesia or Malignant hyperthermia.. PAST SURGICAL HISTORY:   has a past surgical history that includes cabg measures grp; IR Extremity Angiogram Bilateral (11/3/2017); Bypass graft artery coronary (N/A, 03/06/2000); Cardioversion (08/25/2011); Amputate foot (Left, 11/05/2017); Inguinal Hernia Repair (Bilateral, 1969); Cv  Coronary Angiogram (N/A, 10/01/2018); Laparoscopic cholecystectomy (N/A, 08/18/2019); Bypass graft artery coronary (N/A, 10/04/2018); IR Lower Extremity Angiogram Right (1/24/2023); IR Lower Extremity Angiogram Left (3/10/2023); Amputate foot (Right, 4/27/2023); and Lengthen tendon achilles (Right, 4/27/2023).. FAMILY HISTORY: family history includes CABG in his father; Esophageal Cancer (age of onset: 58.00) in his father; No Known Problems in his grandchild, grandchild, sister, sister, and son; Obesity in his sister; Osteoarthritis in his sister; Sudden Death (age of onset: 85.00) in his mother; Valvular heart disease in his father.. SOCIAL HISTORY:   reports that he quit smoking about 23 years ago. His smoking use included cigarettes. He started smoking about 58 years ago. He has a 36.00 pack-year smoking history. He has never used smokeless tobacco. He reports current alcohol use of about 5.0 standard drinks of alcohol per week. He reports that he does not use drugs.  Post Discharge Medication Reconciliation Status: discharge medications reconciled and changed, per note/orders.  Current Outpatient Medications   Medication Sig Dispense Refill     acetaminophen (TYLENOL) 325 MG tablet 1000 mg tid scheduled and 500 mg bid prn       albuterol (PROAIR HFA/PROVENTIL HFA/VENTOLIN HFA) 108 (90 Base) MCG/ACT inhaler Inhale 2 puffs into the lungs 2 times daily And q4h PRN       cetirizine (ZYRTEC) 10 MG tablet Take 10 mg by mouth daily       clopidogrel (PLAVIX) 75 MG tablet Take 1 tablet (75 mg) by mouth daily Start taking medication the day after the procedure. 90 tablet 1     cyanocobalamin (VITAMIN B-12) 2500 MCG SUBL sublingual tablet Place 2,500 mcg under the tongue daily       furosemide (LASIX) 40 MG tablet Take 40 mg by mouth daily       gabapentin (NEURONTIN) 100 MG capsule Take 100 mg by mouth 2 times daily       glipiZIDE (GLUCOTROL) 10 MG tablet Take 10 mg by mouth 2 times daily (before meals)        "ketoconazole (NIZORAL) 2 % external shampoo Apply topically twice a week Mon and Fri.       liraglutide (VICTOZA) 18 MG/3ML solution Inject 0.6 mg Subcutaneous daily       losartan (COZAAR) 25 MG tablet Take 25 mg by mouth daily       mineral oil-hydrophilic petrolatum (AQUAPHOR) external ointment Apply topically 2 times daily       mineral oil-hydrophilic petrolatum (AQUAPHOR) external ointment Apply topically 2 times daily       omeprazole (PRILOSEC) 20 MG CR capsule Take 20 mg by mouth daily       oxyCODONE (ROXICODONE) 5 MG tablet Take 1-2 tablets (5-10 mg) by mouth every 6 hours as needed for moderate to severe pain 16 tablet 0     polyethylene glycol (MIRALAX) 17 g packet Take 1 packet by mouth daily       rivaroxaban ANTICOAGULANT (XARELTO) 15 MG TABS tablet Take by mouth daily (with dinner)       senna-docusate (SENOKOT-S/PERICOLACE) 8.6-50 MG tablet Take 1 tablet by mouth 2 times daily And BID PRN       simvastatin (ZOCOR) 40 MG tablet Take 40 mg by mouth At Bedtime       spironolactone (ALDACTONE) 25 MG tablet Take 25 mg by mouth daily       sulfamethoxazole-trimethoprim (BACTRIM DS) 800-160 MG tablet Take 1 tablet by mouth 2 times daily 20 tablet 0     tamsulosin (FLOMAX) 0.4 MG capsule Take 0.4 mg by mouth daily       ROS: 10 point ROS of systems including Constitutional, Eyes, Respiratory, Cardiovascular, Gastroenterology, Genitourinary, Integumentary, Musculoskeletal, Psychiatric were all negative except for pertinent positives noted in my HPI.    Vitals: /72   Pulse 68   Temp 97.8  F (36.6  C)   Resp 16   Ht 1.778 m (5' 10\")   Wt 83.5 kg (184 lb 1.6 oz)   SpO2 94%   BMI 26.42 kg/m    Exam:  GENERAL APPEARANCE: Alert, in no distress, cooperative.   ENT: Mouth/posterior oropharynx intact w/ moist mucous membranes, hearing acuity [place].  EYES: EOM, conjunctivae, lids, pupils and irises normal, PERRL2.   RESP: Respiratory effort fair, no respiratory distress, Lung sounds clear. On RA.   CV: " Auscultation of heart reveals S1, S2, rate controlled and rhythm irregular, no murmur, no rub or gallop, Edema 0+ BLE. Peripheral pulses are 2+.  ABDOMEN: Normal bowel sounds, soft, non-tender abdomen, and no masses palpated.  SKIN: Inspection/Palpation of skin and subcutaneous tissue baseline w/ fragility. No wounds/rashes noted, except BLE wounds/surgical incisions are covered at this time.   NEURO: CN II-XII at patient's baseline, sensation baseline PPS.  PSYCH: Insight, judgement, and memory are impaired at baseline, affect and mood are happy/engaged.    Lab/Diagnostic data: Recent labs in [facility] reviewed by me today.     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 Ongoing.    Ever remains in contact precautions for MRSA, and given recent cultures from surgery, he has been started on Bactrim.  Appreciate surgical input in his care and he will follow-up with them in a couple weeks.    Provider reviewed records/notes and interpreted the results of several tests in [facility] and/or at the facility.    Blood glucose, and other vital signs appear to be well controlled at this time.  These underlying issues may affect his ability to heal.    Provider coordinated care with nursing, did report noncompliance with nonweightbearing status.  This will make it difficult for Ever to heal.    No recent B12 level, no macrocytosis, with pretty significant B12 supplementation.  Will obtain B12 level and postoperative blood work to ensure renal stability after Bactrim start, and hemoglobin given he is slightly anemic.     We will discontinue nitroglycerin, as this has not been used, and is available on standing  house orders should chest pain arise.    For constipation, will start senna at low-dose on a scheduled basis.  Follow up w/in 1 week or as needed.    Orders:  1. CBC, BMP, B12 level x1 on 5/3. Dx: post-op labs.  2. Discontinue nitroglycerin (use CIERRA).   3. Senna S 1 tab PO BID. Dx: constipation.     Electronically signed by:  Dr. Yanna Dozier, APRN CNP DNP

## 2023-05-02 ENCOUNTER — LAB REQUISITION (OUTPATIENT)
Dept: LAB | Facility: CLINIC | Age: 78
End: 2023-05-02
Payer: MEDICARE

## 2023-05-02 DIAGNOSIS — I73.9 PERIPHERAL VASCULAR DISEASE, UNSPECIFIED (H): ICD-10-CM

## 2023-05-02 DIAGNOSIS — E53.9 VITAMIN B DEFICIENCY, UNSPECIFIED: ICD-10-CM

## 2023-05-02 LAB
PATH REPORT.COMMENTS IMP SPEC: NORMAL
PATH REPORT.COMMENTS IMP SPEC: NORMAL
PATH REPORT.FINAL DX SPEC: NORMAL
PATH REPORT.GROSS SPEC: NORMAL
PATH REPORT.MICROSCOPIC SPEC OTHER STN: NORMAL
PATH REPORT.RELEVANT HX SPEC: NORMAL
PHOTO IMAGE: NORMAL

## 2023-05-02 PROCEDURE — 88307 TISSUE EXAM BY PATHOLOGIST: CPT | Mod: 26 | Performed by: PATHOLOGY

## 2023-05-02 PROCEDURE — 88311 DECALCIFY TISSUE: CPT | Mod: 26 | Performed by: PATHOLOGY

## 2023-05-03 ENCOUNTER — TELEPHONE (OUTPATIENT)
Dept: GERIATRICS | Facility: CLINIC | Age: 78
End: 2023-05-03

## 2023-05-03 LAB
ANION GAP SERPL CALCULATED.3IONS-SCNC: 14 MMOL/L (ref 7–15)
BUN SERPL-MCNC: 27.3 MG/DL (ref 8–23)
CALCIUM SERPL-MCNC: 9.3 MG/DL (ref 8.8–10.2)
CHLORIDE SERPL-SCNC: 101 MMOL/L (ref 98–107)
CREAT SERPL-MCNC: 1.59 MG/DL (ref 0.67–1.17)
DEPRECATED HCO3 PLAS-SCNC: 24 MMOL/L (ref 22–29)
ERYTHROCYTE [DISTWIDTH] IN BLOOD BY AUTOMATED COUNT: 14.8 % (ref 10–15)
GFR SERPL CREATININE-BSD FRML MDRD: 44 ML/MIN/1.73M2
GLUCOSE SERPL-MCNC: 98 MG/DL (ref 70–99)
HCT VFR BLD AUTO: 30.9 % (ref 40–53)
HGB BLD-MCNC: 9.6 G/DL (ref 13.3–17.7)
MCH RBC QN AUTO: 25.9 PG (ref 26.5–33)
MCHC RBC AUTO-ENTMCNC: 31.1 G/DL (ref 31.5–36.5)
MCV RBC AUTO: 83 FL (ref 78–100)
PLATELET # BLD AUTO: 292 10E3/UL (ref 150–450)
POTASSIUM SERPL-SCNC: 4.6 MMOL/L (ref 3.4–5.3)
RBC # BLD AUTO: 3.71 10E6/UL (ref 4.4–5.9)
SODIUM SERPL-SCNC: 139 MMOL/L (ref 136–145)
VIT B12 SERPL-MCNC: 1455 PG/ML (ref 232–1245)
WBC # BLD AUTO: 10.2 10E3/UL (ref 4–11)

## 2023-05-03 PROCEDURE — P9604 ONE-WAY ALLOW PRORATED TRIP: HCPCS | Mod: ORL | Performed by: NURSE PRACTITIONER

## 2023-05-03 PROCEDURE — 36415 COLL VENOUS BLD VENIPUNCTURE: CPT | Mod: ORL | Performed by: NURSE PRACTITIONER

## 2023-05-03 PROCEDURE — 80048 BASIC METABOLIC PNL TOTAL CA: CPT | Mod: ORL | Performed by: NURSE PRACTITIONER

## 2023-05-03 PROCEDURE — 82607 VITAMIN B-12: CPT | Mod: ORL | Performed by: NURSE PRACTITIONER

## 2023-05-03 PROCEDURE — 85027 COMPLETE CBC AUTOMATED: CPT | Mod: ORL | Performed by: NURSE PRACTITIONER

## 2023-05-03 NOTE — TELEPHONE ENCOUNTER
[facility] Lab Note     Provider: KEVIN Lundberg CNP, DNP  Facility: [facility] Facility Type:  TCU    Allergies   Allergen Reactions     Doxycycline Rash     Latex Rash     Penicillin V Rash     Reaction occurred as a child. Patient tolerated Zosyn 6/2018, Cefazolin 10/2018, and has also tolerated Augmentin.     Penicillins Rash     Reaction occurred as a child. Patient tolerated Zosyn 6/2018, Cefazolin 10/2018, and has also tolerated Augmentin.       Labs Reviewed by provider: CBC and BMP     Verbal Order/Direction given by Provider: No new orders    Provider giving Order:  KEVIN Lundberg CNP, KIMBERLY    Verbal Order given to: Gini Quintero RN

## 2023-05-05 ENCOUNTER — OFFICE VISIT (OUTPATIENT)
Dept: VASCULAR SURGERY | Facility: CLINIC | Age: 78
End: 2023-05-05
Attending: PODIATRIST
Payer: MEDICARE

## 2023-05-05 VITALS
OXYGEN SATURATION: 92 % | WEIGHT: 184 LBS | DIASTOLIC BLOOD PRESSURE: 68 MMHG | HEIGHT: 70 IN | SYSTOLIC BLOOD PRESSURE: 126 MMHG | HEART RATE: 64 BPM | BODY MASS INDEX: 26.34 KG/M2

## 2023-05-05 DIAGNOSIS — L03.119 CELLULITIS AND ABSCESS OF FOOT EXCLUDING TOE: Primary | ICD-10-CM

## 2023-05-05 DIAGNOSIS — L89.613 PRESSURE ULCER OF RIGHT HEEL, STAGE 3 (H): ICD-10-CM

## 2023-05-05 DIAGNOSIS — L02.619 CELLULITIS AND ABSCESS OF FOOT EXCLUDING TOE: Primary | ICD-10-CM

## 2023-05-05 LAB
BACTERIA WND CULT: ABNORMAL
BACTERIA WND CULT: ABNORMAL

## 2023-05-05 PROCEDURE — 11043 DBRDMT MUSC&/FSCA 1ST 20/<: CPT | Performed by: PODIATRIST

## 2023-05-05 PROCEDURE — G0463 HOSPITAL OUTPT CLINIC VISIT: HCPCS | Mod: 25 | Performed by: PODIATRIST

## 2023-05-05 PROCEDURE — 11046 DBRDMT MUSC&/FSCA EA ADDL: CPT | Performed by: PODIATRIST

## 2023-05-05 PROCEDURE — 99024 POSTOP FOLLOW-UP VISIT: CPT | Performed by: PODIATRIST

## 2023-05-05 RX ORDER — CLINDAMYCIN HCL 300 MG
300 CAPSULE ORAL 4 TIMES DAILY
Qty: 40 CAPSULE | Refills: 0 | Status: SHIPPED | OUTPATIENT
Start: 2023-05-05 | End: 2023-05-15

## 2023-05-05 ASSESSMENT — PAIN SCALES - GENERAL: PAINLEVEL: SEVERE PAIN (7)

## 2023-05-05 NOTE — PATIENT INSTRUCTIONS
Apply dry gauze to right foot and heel. Change daily and as needed.     Remain non weight bearing on right foot at all times.    Wear your PRAFO foot at all times.     Start antibiotics today.     Dr. Spears will you call you with MRI results and next steps.

## 2023-05-05 NOTE — PROGRESS NOTES
FOOT AND ANKLE SURGERY/PODIATRY Progress Note      ASSESSMENT:   Ulceration right heel  Status post transmetatarsal amputation with Achilles tendon lengthening right      TREATMENT:  -Discussed the patient and present family member today the surgical site in the right foot does not be progressing well with nonviable tissue developing centrally with partial-thickness gapping.  We discussed importance of nonweightbearing at all times and risk of continued walking on the right foot including full-thickness dehiscence of the surgical site and need for additional surgical invention.    -Also reviewed that with decreased blood flow to the right foot with other comorbidities including CKD salvage of the right foot.  Available data.    -Pathology report indicates a clean proximal margin.    -I reviewed culture and sensitivity report.  We will begin clindamycin at this time.    -The right heel ulceration has granular tissue along majority of the ulcer however does have increased depth centrally.  Referred for MRI for further evaluation of underlying osteomyelitis.  Discussed surgical intervention with use of wound VAC at this site may be necessary.    -After discussion of risk factors and consent obtained 2% Lidocaine HCL jelly was applied, under clean conditions, the right heel ulceration(s) were debrided using currette.  Devitalized and nonviable tissue, along with any fibrin and slough, was removed to improve granulation tissue formation, stimulate wound healing, decrease overall bacteria load, disrupt biofilm formation and decrease edge senescence. Wound drainage was scant No. Total excisional debridement was 52.5 sq cm into the muscle/fascia with a depth of 0.5 cm.   Ulcers were improved afterwards and .  Measures were as noted on the flow sheet. A gauze dressing was applied.     -I will contact the patient with the MRI report when available and we will be guided by the results.     -He will follow-up in 2  kendra Spears DPM  [facility]      HPI: Ever Crane was seen again today status post transmetatarsal amputation with Achilles tendon right and also a right heel ulceration.  He admits ambulating on the right foot denies any nausea vomiting fever chills.    Past Medical History:   Diagnosis Date     A-fib (H)      MESHA (acute kidney injury) (H)      Anemia      Atopic keratoconjunctivitis      Atrial fibrillation (H)     Abhi Brian: 8/2011 Cardioversion; CHADS2 VASC = 5; he is on warfarin and sotalol      Atrial flutter (H)      Mcgarry's esophagus 01/01/2012    per note of Dr. Tavo Mike of [facility]     Bilateral lower leg cellulitis 08/31/2018     BPH (benign prostatic hyperplasia)      Candidiasis of perineum 01/03/2018     Carotid stenosis      Carotid stenosis, asymptomatic, right      CHF (congestive heart failure) (H)      Cholecystitis, acute 08/18/2019     Chronic systolic heart failure (H)      Chronic venous stasis dermatitis      Closed nondisplaced intertrochanteric fracture of left femur with routine healing, subsequent encounter 05/18/2022     Clostridium difficile colitis      COPD (chronic obstructive pulmonary disease) (H)      Coronary artery disease due to lipid rich plaque 2000    CABG x2     Diabetes (H)      Diabetic ulcer of both feet (H) 10/31/2017     Dyslexia      Dyslipidemia, goal LDL below 70 2000     Epistaxis      Essential hypertension      Gangrene of left foot (H)      GERD (gastroesophageal reflux disease)      HLD (hyperlipidemia)      HTN (hypertension)      Hyponatremia      Ischemic heart disease      Lymphedema      Mild cognitive impairment      MRSA (methicillin resistant Staphylococcus aureus)      Neuropathy      Non-STEMI (non-ST elevated myocardial infarction) (H)      Occlusion and stenosis of right carotid artery      Osteoarthrosis      Osteomyelitis of ankle and foot (H)      Other atopic dermatitis      PAD (peripheral artery  disease) (H)      Peripheral vascular disease (H)      Pneumonia 06/26/2018     Polyneuropathy due to type 2 diabetes mellitus (H)      Pressure ulcer, heel     Bilateral     Renal insufficiency      Type 2 diabetes mellitus, without long-term current use of insulin (H)      Unable to function independently 11/13/2017       Past Surgical History:   Procedure Laterality Date     AMPUTATE FOOT Left 11/05/2017    Procedure: LEFT TRANSMETATARSAL AMPUTATION;  Surgeon: Ever Wick MD;  Location: [facility];  Service:      AMPUTATE FOOT Right 4/27/2023    Procedure: Transmetatarsal amputation right foot;  Surgeon: Miguelangel Spears DPM;  Location: [facility]     BYPASS GRAFT ARTERY CORONARY N/A 03/06/2000    SVG to OM1, SVG to PDA     BYPASS GRAFT ARTERY CORONARY N/A 10/04/2018    redo CABG due to graft failure     CABG MEASURES GRP       CARDIOVERSION  08/25/2011     CV CORONARY ANGIOGRAM N/A 10/01/2018    Procedure: Coronary Angiogram;  Surgeon: Miki Mac MD;  Location: [facility];  Service:      INGUINAL HERNIA REPAIR Bilateral 1969    and 1979     IR EXTREMITY ANGIOGRAM BILATERAL  11/3/2017     IR LOWER EXTREMITY ANGIOGRAM LEFT  3/10/2023     IR LOWER EXTREMITY ANGIOGRAM RIGHT  1/24/2023     LAPAROSCOPIC CHOLECYSTECTOMY N/A 08/18/2019    Procedure: CHOLECYSTECTOMY, LAPAROSCOPIC;  Surgeon: Stewart Fountain MD;  Location: [facility];  Service: General     LENGTHEN TENDON ACHILLES Right 4/27/2023    Procedure: with Achilles tendon lengthening, debridement of right heel ulceration.;  Surgeon: Miguelangel Spears DPM;  Location: [facility]       Allergies   Allergen Reactions     Doxycycline Rash     Latex Rash     Penicillin V Rash     Reaction occurred as a child. Patient tolerated Zosyn 6/2018, Cefazolin 10/2018, and has also tolerated Augmentin.     Penicillins Rash     Reaction occurred as a child. Patient tolerated Zosyn 6/2018, Cefazolin 10/2018, and has  also tolerated Augmentin.         Current Outpatient Medications:      acetaminophen (TYLENOL) 325 MG tablet, 1000 mg tid scheduled and 500 mg bid prn, Disp: , Rfl:      albuterol (PROAIR HFA/PROVENTIL HFA/VENTOLIN HFA) 108 (90 Base) MCG/ACT inhaler, Inhale 2 puffs into the lungs 2 times daily And q4h PRN, Disp: , Rfl:      cetirizine (ZYRTEC) 10 MG tablet, Take 10 mg by mouth daily, Disp: , Rfl:      clindamycin (CLEOCIN) 300 MG capsule, Take 1 capsule (300 mg) by mouth 4 times daily for 10 days, Disp: 40 capsule, Rfl: 0     clopidogrel (PLAVIX) 75 MG tablet, Take 1 tablet (75 mg) by mouth daily Start taking medication the day after the procedure., Disp: 90 tablet, Rfl: 1     cyanocobalamin (VITAMIN B-12) 2500 MCG SUBL sublingual tablet, Place 2,500 mcg under the tongue daily, Disp: , Rfl:      furosemide (LASIX) 40 MG tablet, Take 40 mg by mouth daily, Disp: , Rfl:      gabapentin (NEURONTIN) 100 MG capsule, Take 100 mg by mouth 2 times daily, Disp: , Rfl:      glipiZIDE (GLUCOTROL) 10 MG tablet, Take 10 mg by mouth 2 times daily (before meals), Disp: , Rfl:      ketoconazole (NIZORAL) 2 % external shampoo, Apply topically twice a week Mon and Fri., Disp: , Rfl:      liraglutide (VICTOZA) 18 MG/3ML solution, Inject 0.6 mg Subcutaneous daily, Disp: , Rfl:      losartan (COZAAR) 25 MG tablet, Take 25 mg by mouth daily, Disp: , Rfl:      mineral oil-hydrophilic petrolatum (AQUAPHOR) external ointment, Apply topically 2 times daily, Disp: , Rfl:      mineral oil-hydrophilic petrolatum (AQUAPHOR) external ointment, Apply topically 2 times daily, Disp: , Rfl:      omeprazole (PRILOSEC) 20 MG CR capsule, Take 20 mg by mouth daily, Disp: , Rfl:      oxyCODONE (ROXICODONE) 5 MG tablet, Take 1-2 tablets (5-10 mg) by mouth every 6 hours as needed for moderate to severe pain, Disp: 16 tablet, Rfl: 0     polyethylene glycol (MIRALAX) 17 g packet, Take 1 packet by mouth daily, Disp: , Rfl:      rivaroxaban ANTICOAGULANT  "(XARELTO) 15 MG TABS tablet, Take by mouth daily (with dinner), Disp: , Rfl:      senna-docusate (SENOKOT-S/PERICOLACE) 8.6-50 MG tablet, Take 1 tablet by mouth 2 times daily And BID PRN, Disp: , Rfl:      simvastatin (ZOCOR) 40 MG tablet, Take 40 mg by mouth At Bedtime, Disp: , Rfl:      spironolactone (ALDACTONE) 25 MG tablet, Take 25 mg by mouth daily, Disp: , Rfl:      sulfamethoxazole-trimethoprim (BACTRIM DS) 800-160 MG tablet, Take 1 tablet by mouth 2 times daily, Disp: 20 tablet, Rfl: 0     tamsulosin (FLOMAX) 0.4 MG capsule, Take 0.4 mg by mouth daily, Disp: , Rfl:     Review of Systems - 10 point Review of Systems is negative except for right heel ulceration which is noted in HPI.      OBJECTIVE:  /68   Pulse 64   Ht 5' 10\" (1.778 m)   Wt 184 lb (83.5 kg)   SpO2 92%   BMI 26.40 kg/m    General appearance: Patient is alert and fully cooperative with history & exam.  No sign of distress is noted during the visit.    Vascular: Dorsalis pedis non-palpableRight.  Dermatologic:    VASC Wound right foot 2nd toe (Active)   Pre Size Length 3 01/13/23 1256   Pre Size Width 2 01/13/23 1256   Pre Size Depth 0.1 01/13/23 1256   Pre Total Sq cm 6 01/13/23 1256       VASC Wound right foot 3rd toe (Active)   Pre Size Length 1.6 01/13/23 1256   Pre Size Width 1.2 01/13/23 1256   Pre Size Depth 0.1 01/05/23 1100   Pre Total Sq cm 1.92 01/13/23 1256       VASC Wound right foot btween 3rd and 4th toe (Active)   Pre Size Length 0.5 01/13/23 1256   Pre Size Width 0.7 01/13/23 1256   Pre Size Depth 0.1 01/13/23 1256   Pre Total Sq cm 0.35 01/13/23 1256       VASC Wound right foot 4th toe (Active)   Pre Size Length 0.8 01/13/23 1256   Pre Size Width 0.3 01/13/23 1256   Pre Size Depth 0.1 01/13/23 1256   Pre Total Sq cm 4 01/05/23 1100       VASC Wound right foot 5th toe (Active)   Pre Size Length 1.7 01/13/23 1256   Pre Size Width 1.3 01/13/23 1256   Pre Size Depth 0.2 01/13/23 1256   Pre Total Sq cm 2.21 01/13/23 1256 "       VASC Wound Right lateral foot (Active)   Pre Size Length 1.8 01/13/23 1256   Pre Size Width 2.2 01/13/23 1256   Pre Size Depth 0.1 01/13/23 1256   Pre Total Sq cm 3.96 01/13/23 1256       VASC Wound Right foot between 4th and 5th toe (Active)   Pre Size Length 1.4 01/13/23 1256   Pre Size Width 4 01/13/23 1256   Pre Size Depth 0.5 01/13/23 1256   Pre Total Sq cm 5.6 01/13/23 1256       VASC Wound Left lef distal (Active)   Pre Size Length 0.7 01/13/23 1256   Pre Size Width 0.6 01/13/23 1256   Pre Size Depth 0.2 01/13/23 1256   Pre Total Sq cm 0.42 01/13/23 1256       VASC Wound left leg proximal (Active)   Pre Size Length 7.7 01/13/23 1256   Pre Size Width 2.8 01/13/23 1256   Pre Size Depth 0.2 01/13/23 1256   Pre Total Sq cm 21.56 01/13/23 1256       VASC Wound Right heel (Active)   Pre Size Length 7 05/05/23 1000   Pre Size Width 7.5 05/05/23 1000   Pre Size Depth 0.5 05/05/23 1000   Pre Total Sq cm 52.5 05/05/23 1000   Post Size Length 9 01/13/23 1300   Post Size Width 9 01/13/23 1300   Post Size Depth 0.1 01/13/23 1300   Post Total Sq cm 81 01/13/23 1300   Description eschar 04/05/23 1000       VASC Wound Left heel (Active)   Pre Size Length 6 04/05/23 1000   Pre Size Width 6.5 04/05/23 1000   Pre Size Depth 0.2 04/05/23 1000   Pre Total Sq cm 39 04/05/23 1000   Post Size Length 7 01/13/23 1300   Post Size Width 6 01/13/23 1300   Post Size Depth 0.1 01/13/23 1300   Post Total Sq cm 42 01/13/23 1300       VASC Wound Right MPJ (Active)   Pre Size Length 3 04/05/23 1100   Pre Size Width 2.6 04/05/23 1100   Pre Size Depth 1 04/05/23 1100   Pre Total Sq cm 7.8 04/05/23 1100       VASC Wound Right 2nd toe (Active)   Pre Size Length 1 04/05/23 1100   Pre Size Width 1 04/05/23 1100   Pre Size Depth 0.2 04/05/23 1100   Pre Total Sq cm 1 04/05/23 1100       Incision/Surgical Site 03/10/23 Anterior;Left Pelvis (Active)       Incision/Surgical Site 04/27/23 Right Foot (Active)   Dressing Intervention Clean, dry,  "intact 04/27/23 1900   Sutures intact along distal right foot incision with superficial gapping noted centrally nonviable tissue developing including dusky appearance along central incision.  Granular tissue right heel ulceration with increased depth noted centrally.  Sutures intact posterior right ankle along Achilles tendon with skin edges well approximated.  Neurologic: Diminished to light touch Right.  Musculoskeletal: TMA right.    Imaging:     POC US Guidance Needle Placement    Result Date: 4/27/2023  Ultrasound was performed as guidance to an anesthesia procedure.  Click \"PACS images\" hyperlink below to view any stored images.  For specific procedure details, view procedure note authored by anesthesia.         Picture: None    "

## 2023-05-11 ENCOUNTER — HOSPITAL ENCOUNTER (OUTPATIENT)
Dept: MRI IMAGING | Facility: HOSPITAL | Age: 78
Discharge: HOME | End: 2023-05-11
Attending: PODIATRIST | Admitting: PODIATRIST
Payer: MEDICARE

## 2023-05-11 ENCOUNTER — TRANSITIONAL CARE UNIT VISIT (OUTPATIENT)
Dept: GERIATRICS | Facility: CLINIC | Age: 78
End: 2023-05-11
Payer: MEDICARE

## 2023-05-11 VITALS
BODY MASS INDEX: 26.36 KG/M2 | SYSTOLIC BLOOD PRESSURE: 110 MMHG | OXYGEN SATURATION: 95 % | RESPIRATION RATE: 18 BRPM | DIASTOLIC BLOOD PRESSURE: 56 MMHG | WEIGHT: 184.1 LBS | HEART RATE: 64 BPM | HEIGHT: 70 IN | TEMPERATURE: 97.9 F

## 2023-05-11 DIAGNOSIS — E11.42 POLYNEUROPATHY DUE TO TYPE 2 DIABETES MELLITUS (H): ICD-10-CM

## 2023-05-11 DIAGNOSIS — E11.59 TYPE 2 DIABETES MELLITUS WITH OTHER CIRCULATORY COMPLICATION, WITH LONG-TERM CURRENT USE OF INSULIN (H): Primary | ICD-10-CM

## 2023-05-11 DIAGNOSIS — L89.613 PRESSURE ULCER OF RIGHT HEEL, STAGE 3 (H): ICD-10-CM

## 2023-05-11 DIAGNOSIS — Z89.431 STATUS POST AMPUTATION OF RIGHT FOOT THROUGH METATARSAL BONE (H): ICD-10-CM

## 2023-05-11 DIAGNOSIS — Z79.4 TYPE 2 DIABETES MELLITUS WITH OTHER CIRCULATORY COMPLICATION, WITH LONG-TERM CURRENT USE OF INSULIN (H): Primary | ICD-10-CM

## 2023-05-11 DIAGNOSIS — I73.9 PAD (PERIPHERAL ARTERY DISEASE) (H): ICD-10-CM

## 2023-05-11 PROCEDURE — 73723 MRI JOINT LWR EXTR W/O&W/DYE: CPT | Mod: RT,MG

## 2023-05-11 PROCEDURE — A9585 GADOBUTROL INJECTION: HCPCS | Performed by: PODIATRIST

## 2023-05-11 PROCEDURE — G1010 CDSM STANSON: HCPCS

## 2023-05-11 PROCEDURE — 255N000002 HC RX 255 OP 636: Performed by: PODIATRIST

## 2023-05-11 PROCEDURE — 99309 SBSQ NF CARE MODERATE MDM 30: CPT | Performed by: NURSE PRACTITIONER

## 2023-05-11 RX ORDER — GADOBUTROL 604.72 MG/ML
8 INJECTION INTRAVENOUS ONCE
Status: COMPLETED | OUTPATIENT
Start: 2023-05-11 | End: 2023-05-11

## 2023-05-11 RX ADMIN — GADOBUTROL 8 ML: 604.72 INJECTION INTRAVENOUS at 18:08

## 2023-05-11 NOTE — H&P (VIEW-ONLY)
[facility]  Episodic/Acute/Follow-Up  [place] MRN: [id number]. Place of Service where encounter took place:  [facility] ([facility]) [[id number]]   Chief Complaint   Patient presents with     RECHECK    HPI: Ever Crane  is a 78 year old (1945), who is being seen today for an episodic care visit. Today's concern is:    Ever seen today on routine follow-up as he continues to rehab in [facility].  He is not currently receiving any therapy services, as he refuses them.  He also remains nonweightbearing per his surgical agreement with Dr. Spears.    Today, Ever says he does have intermittent pain, especially on the right lower extremity.  He says that it is manageable, and he knows he has not used any of his PRN oxycodone.  He says sometimes he does have loose stools, but he is not sure why and is trying to alter his diet (cutting out orange juice) to see if that helps.  He denies any nausea or heartburn.  He denies any shortness of breath, headache, dizziness.  He denies any fevers.    Past Medical and Surgical History reviewed in Epic today.  MEDICATIONS:  Current Outpatient Medications   Medication Sig Dispense Refill     acetaminophen (TYLENOL) 325 MG tablet 1000 mg tid scheduled and 500 mg bid prn       albuterol (PROAIR HFA/PROVENTIL HFA/VENTOLIN HFA) 108 (90 Base) MCG/ACT inhaler Inhale 2 puffs into the lungs 2 times daily And q4h PRN       cetirizine (ZYRTEC) 10 MG tablet Take 10 mg by mouth daily       clindamycin (CLEOCIN) 300 MG capsule Take 1 capsule (300 mg) by mouth 4 times daily for 10 days 40 capsule 0     clopidogrel (PLAVIX) 75 MG tablet Take 1 tablet (75 mg) by mouth daily Start taking medication the day after the procedure. 90 tablet 1     furosemide (LASIX) 40 MG tablet Take 40 mg by mouth daily       gabapentin (NEURONTIN) 100 MG capsule Take 100 mg by mouth 2 times daily       glipiZIDE (GLUCOTROL) 10 MG tablet Take 5 mg by mouth 2 times daily (before meals)       ketoconazole  "(NIZORAL) 2 % external shampoo Apply topically twice a week Mon and Fri.       liraglutide (VICTOZA) 18 MG/3ML solution Inject 0.6 mg Subcutaneous daily       losartan (COZAAR) 25 MG tablet Take 25 mg by mouth daily       mineral oil-hydrophilic petrolatum (AQUAPHOR) external ointment Apply topically 2 times daily       mineral oil-hydrophilic petrolatum (AQUAPHOR) external ointment Apply topically 2 times daily       omeprazole (PRILOSEC) 20 MG CR capsule Take 20 mg by mouth daily       polyethylene glycol (MIRALAX) 17 g packet Take 1 packet by mouth daily       rivaroxaban ANTICOAGULANT (XARELTO) 15 MG TABS tablet Take by mouth daily (with dinner)       senna-docusate (SENOKOT-S/PERICOLACE) 8.6-50 MG tablet Take 1 tablet by mouth 2 times daily And BID PRN       simvastatin (ZOCOR) 40 MG tablet Take 40 mg by mouth At Bedtime       spironolactone (ALDACTONE) 25 MG tablet Take 25 mg by mouth daily       tamsulosin (FLOMAX) 0.4 MG capsule Take 0.4 mg by mouth daily       Objective: /56   Pulse 64   Temp 97.9  F (36.6  C)   Resp 18   Ht 1.778 m (5' 10\")   Wt 83.5 kg (184 lb 1.6 oz)   SpO2 95%   BMI 26.42 kg/m     BGs:    Exam:  GENERAL APPEARANCE: Alert, in no distress, cooperative.   RESP: Respiratory effort good, no respiratory distress, On RA.   CV:  Edema 1+ BLE. Peripheral pulses are 2+.  PSYCH: Insight, judgement, and memory are impaired at baseline, affect and mood are happy/engaged.    Labs: Labs done in facility are in EPIC. Please refer to them using [employer]/Care Everywhere.    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 Ongoing.    Provider reviewed recent blood work.  B12 level is quite elevated.  We will discontinue the supplementation.    Per " patient's own statement he feels his pain is manageable with current regimen, and there has been no use of as needed oxycodone.  Will discontinue oxycodone.    Noting ongoing antibiotic dosing via vascular surgery.  Clindamycin is known for causing GI upset/diarrhea, and this could be contributory.  Did  patient on the use of orange juice, which he loves.  This could be driving up his blood sugars, and create acidic environment.  We will trend his A1c and trial reduction in his glipizide since he is cutting this out.  Of note, this dosing is not recommended in older adults, and places him at risk for hypoglycemia.  Follow up w/in 1-2 weeks or as needed.    Orders:  1. Discontinue Vitamin B12.   2. Discontinue Oxycodone (not used in 1 week).   3. Decrease Glipizide to 5mg PO BID. Dx: DM II.  4. a1c x1 on 5/15. Dx: DM II.     Electronically signed by:  Dr. Yanna Dozier, APRN CNP DNP

## 2023-05-11 NOTE — LETTER
5/11/2023        RE: Ever Crane  [address]  [address]  [place] [address]        [facility] GERIATRIC SERVICE  Episodic/Acute/Follow-Up  [place] MRN: [id number]. Place of Service where encounter took place:  [facility] [place] [facility] [[id number]]   Chief Complaint   Patient presents with     RECHECK    HPI: Ever Crane  is a 78 year old (1945), who is being seen today for an episodic care visit. Today's concern is:    Ever seen today on routine follow-up as he continues to rehab in [facility].  He is not currently receiving any therapy services, as he refuses them.  He also remains nonweightbearing per his surgical agreement with Dr. Spears.    Today, Ever says he does have intermittent pain, especially on the right lower extremity.  He says that it is manageable, and he knows he has not used any of his PRN oxycodone.  He says sometimes he does have loose stools, but he is not sure why and is trying to alter his diet (cutting out orange juice) to see if that helps.  He denies any nausea or heartburn.  He denies any shortness of breath, headache, dizziness.  He denies any fevers.    Past Medical and Surgical History reviewed in Epic today.  MEDICATIONS:  Current Outpatient Medications   Medication Sig Dispense Refill     acetaminophen (TYLENOL) 325 MG tablet 1000 mg tid scheduled and 500 mg bid prn       albuterol (PROAIR HFA/PROVENTIL HFA/VENTOLIN HFA) 108 (90 Base) MCG/ACT inhaler Inhale 2 puffs into the lungs 2 times daily And q4h PRN       cetirizine (ZYRTEC) 10 MG tablet Take 10 mg by mouth daily       clindamycin (CLEOCIN) 300 MG capsule Take 1 capsule (300 mg) by mouth 4 times daily for 10 days 40 capsule 0     clopidogrel (PLAVIX) 75 MG tablet Take 1 tablet (75 mg) by mouth daily Start taking medication the day after the procedure. 90 tablet 1     furosemide (LASIX) 40 MG tablet Take 40 mg by mouth daily       gabapentin (NEURONTIN) 100 MG capsule Take 100 mg by mouth 2 times daily        "glipiZIDE (GLUCOTROL) 10 MG tablet Take 5 mg by mouth 2 times daily (before meals)       ketoconazole (NIZORAL) 2 % external shampoo Apply topically twice a week Mon and Fri.       liraglutide (VICTOZA) 18 MG/3ML solution Inject 0.6 mg Subcutaneous daily       losartan (COZAAR) 25 MG tablet Take 25 mg by mouth daily       mineral oil-hydrophilic petrolatum (AQUAPHOR) external ointment Apply topically 2 times daily       mineral oil-hydrophilic petrolatum (AQUAPHOR) external ointment Apply topically 2 times daily       omeprazole (PRILOSEC) 20 MG CR capsule Take 20 mg by mouth daily       polyethylene glycol (MIRALAX) 17 g packet Take 1 packet by mouth daily       rivaroxaban ANTICOAGULANT (XARELTO) 15 MG TABS tablet Take by mouth daily (with dinner)       senna-docusate (SENOKOT-S/PERICOLACE) 8.6-50 MG tablet Take 1 tablet by mouth 2 times daily And BID PRN       simvastatin (ZOCOR) 40 MG tablet Take 40 mg by mouth At Bedtime       spironolactone (ALDACTONE) 25 MG tablet Take 25 mg by mouth daily       tamsulosin (FLOMAX) 0.4 MG capsule Take 0.4 mg by mouth daily       Objective: /56   Pulse 64   Temp 97.9  F (36.6  C)   Resp 18   Ht 1.778 m (5' 10\")   Wt 83.5 kg (184 lb 1.6 oz)   SpO2 95%   BMI 26.42 kg/m     BGs:    Exam:  GENERAL APPEARANCE: Alert, in no distress, cooperative.   RESP: Respiratory effort good, no respiratory distress, On RA.   CV:  Edema 1+ BLE. Peripheral pulses are 2+.  PSYCH: Insight, judgement, and memory are impaired at baseline, affect and mood are happy/engaged.    Labs: Labs done in facility are in EPIC. Please refer to them using [employer]/Care Everywhere.    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 Ongoing.    Provider reviewed " recent blood work.  B12 level is quite elevated.  We will discontinue the supplementation.    Per patient's own statement he feels his pain is manageable with current regimen, and there has been no use of as needed oxycodone.  Will discontinue oxycodone.    Noting ongoing antibiotic dosing via vascular surgery.  Clindamycin is known for causing GI upset/diarrhea, and this could be contributory.  Did  patient on the use of orange juice, which he loves.  This could be driving up his blood sugars, and create acidic environment.  We will trend his A1c and trial reduction in his glipizide since he is cutting this out.  Of note, this dosing is not recommended in older adults, and places him at risk for hypoglycemia.  Follow up w/in 1-2 weeks or as needed.    Orders:  1. Discontinue Vitamin B12.   2. Discontinue Oxycodone (not used in 1 week).   3. Decrease Glipizide to 5mg PO BID. Dx: DM II.  4. a1c x1 on 5/15. Dx: DM II.     Electronically signed by:  Dr. Yanna Dozier, APRN CNP DNP          Sincerely,        Yanna Dozier, KEVIN CNP

## 2023-05-11 NOTE — PROGRESS NOTES
[email or web address]  Episodic/Acute/Follow-Up  [place] MRN: [id number]. Place of Service where encounter took place:  [facility] ([facility]) [[id number]]   Chief Complaint   Patient presents with     RECHECK    HPI: Ever Crane  is a 78 year old (1945), who is being seen today for an episodic care visit. Today's concern is:    Ever seen today on routine follow-up as he continues to rehab in [facility].  He is not currently receiving any therapy services, as he refuses them.  He also remains nonweightbearing per his surgical agreement with Dr. Spears.    Today, Ever says he does have intermittent pain, especially on the right lower extremity.  He says that it is manageable, and he knows he has not used any of his PRN oxycodone.  He says sometimes he does have loose stools, but he is not sure why and is trying to alter his diet (cutting out orange juice) to see if that helps.  He denies any nausea or heartburn.  He denies any shortness of breath, headache, dizziness.  He denies any fevers.    Past Medical and Surgical History reviewed in Epic today.  MEDICATIONS:  Current Outpatient Medications   Medication Sig Dispense Refill     acetaminophen (TYLENOL) 325 MG tablet 1000 mg tid scheduled and 500 mg bid prn       albuterol (PROAIR HFA/PROVENTIL HFA/VENTOLIN HFA) 108 (90 Base) MCG/ACT inhaler Inhale 2 puffs into the lungs 2 times daily And q4h PRN       cetirizine (ZYRTEC) 10 MG tablet Take 10 mg by mouth daily       clindamycin (CLEOCIN) 300 MG capsule Take 1 capsule (300 mg) by mouth 4 times daily for 10 days 40 capsule 0     clopidogrel (PLAVIX) 75 MG tablet Take 1 tablet (75 mg) by mouth daily Start taking medication the day after the procedure. 90 tablet 1     furosemide (LASIX) 40 MG tablet Take 40 mg by mouth daily       gabapentin (NEURONTIN) 100 MG capsule Take 100 mg by mouth 2 times daily       glipiZIDE (GLUCOTROL) 10 MG tablet Take 5 mg by mouth 2 times daily (before meals)       ketoconazole  "(NIZORAL) 2 % external shampoo Apply topically twice a week Mon and Fri.       liraglutide (VICTOZA) 18 MG/3ML solution Inject 0.6 mg Subcutaneous daily       losartan (COZAAR) 25 MG tablet Take 25 mg by mouth daily       mineral oil-hydrophilic petrolatum (AQUAPHOR) external ointment Apply topically 2 times daily       mineral oil-hydrophilic petrolatum (AQUAPHOR) external ointment Apply topically 2 times daily       omeprazole (PRILOSEC) 20 MG CR capsule Take 20 mg by mouth daily       polyethylene glycol (MIRALAX) 17 g packet Take 1 packet by mouth daily       rivaroxaban ANTICOAGULANT (XARELTO) 15 MG TABS tablet Take by mouth daily (with dinner)       senna-docusate (SENOKOT-S/PERICOLACE) 8.6-50 MG tablet Take 1 tablet by mouth 2 times daily And BID PRN       simvastatin (ZOCOR) 40 MG tablet Take 40 mg by mouth At Bedtime       spironolactone (ALDACTONE) 25 MG tablet Take 25 mg by mouth daily       tamsulosin (FLOMAX) 0.4 MG capsule Take 0.4 mg by mouth daily       Objective: /56   Pulse 64   Temp 97.9  F (36.6  C)   Resp 18   Ht 1.778 m (5' 10\")   Wt 83.5 kg (184 lb 1.6 oz)   SpO2 95%   BMI 26.42 kg/m     BGs:    Exam:  GENERAL APPEARANCE: Alert, in no distress, cooperative.   RESP: Respiratory effort good, no respiratory distress, On RA.   CV:  Edema 1+ BLE. Peripheral pulses are 2+.  PSYCH: Insight, judgement, and memory are impaired at baseline, affect and mood are happy/engaged.    Labs: Labs done in facility are in EPIC. Please refer to them using [employer] Everywhere.    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 Ongoing.    Provider reviewed recent blood work.  B12 level is quite elevated.  We will discontinue the supplementation.    Per " patient's own statement he feels his pain is manageable with current regimen, and there has been no use of as needed oxycodone.  Will discontinue oxycodone.    Noting ongoing antibiotic dosing via vascular surgery.  Clindamycin is known for causing GI upset/diarrhea, and this could be contributory.  Did  patient on the use of orange juice, which he loves.  This could be driving up his blood sugars, and create acidic environment.  We will trend his A1c and trial reduction in his glipizide since he is cutting this out.  Of note, this dosing is not recommended in older adults, and places him at risk for hypoglycemia.  Follow up w/in 1-2 weeks or as needed.    Orders:  1. Discontinue Vitamin B12.   2. Discontinue Oxycodone (not used in 1 week).   3. Decrease Glipizide to 5mg PO BID. Dx: DM II.  4. a1c x1 on 5/15. Dx: DM II.     Electronically signed by:  Dr. Yanna Dozier, APRN CNP DNP

## 2023-05-12 ENCOUNTER — DOCUMENTATION ONLY (OUTPATIENT)
Dept: VASCULAR SURGERY | Facility: CLINIC | Age: 78
End: 2023-05-12

## 2023-05-12 ENCOUNTER — VIRTUAL VISIT (OUTPATIENT)
Dept: VASCULAR SURGERY | Facility: CLINIC | Age: 78
End: 2023-05-12
Payer: MEDICARE

## 2023-05-12 ENCOUNTER — TELEPHONE (OUTPATIENT)
Dept: GERIATRICS | Facility: CLINIC | Age: 78
End: 2023-05-12

## 2023-05-12 ENCOUNTER — PREP FOR PROCEDURE (OUTPATIENT)
Dept: VASCULAR SURGERY | Facility: CLINIC | Age: 78
End: 2023-05-12
Payer: MEDICARE

## 2023-05-12 DIAGNOSIS — M86.9 OSTEOMYELITIS OF ANKLE AND FOOT (H): Primary | ICD-10-CM

## 2023-05-12 PROCEDURE — 99024 POSTOP FOLLOW-UP VISIT: CPT | Mod: 95 | Performed by: PODIATRIST

## 2023-05-12 RX ORDER — CLINDAMYCIN PHOSPHATE 900 MG/50ML
900 INJECTION, SOLUTION INTRAVENOUS
Status: CANCELLED | OUTPATIENT
Start: 2023-05-15

## 2023-05-12 RX ORDER — CLINDAMYCIN PHOSPHATE 900 MG/50ML
900 INJECTION, SOLUTION INTRAVENOUS SEE ADMIN INSTRUCTIONS
Status: CANCELLED | OUTPATIENT
Start: 2023-05-15

## 2023-05-12 NOTE — PROGRESS NOTES
FOOT AND ANKLE SURGERY/PODIATRY Progress Note      ASSESSMENT:   Osteomyelitis calcaneus right   Ulceration right heel    Type of service:  Telephone Visit       Telephone Start and End Time : 1:46/1:54    Originating Location (pt. Location):  home      Distant Location :  [facility]         Mode of Communication: Telephone    TREATMENT:  -I reviewed the patient's right foot MRI report with the patient's sister: 1.  Bone signal abnormality and abnormal enhancement worrisome for early developing osteomyelitis confined to the superficial margin of the inferolateral aspect of the calcaneus which is seen at the base of an ulceration.  2.  Surrounding edema or cellulitis about this region, as well as around the lower leg and ankle but no evidence for organized fluid collection to suggest abscess.  3.  No evidence for fracture.  4.  No significant tendinous or ligamentous pathology. There is mild Achilles tendinopathy.  5.  Degenerative change at the midfoot, incompletely visualized at the navicular cuneiform and TMT joints.    -Due to the presence of osteomyelitis, I reviewed the treatment options to include either 6 weeks IV antibiotics with Infectious Disease/surgical debridement with removal of bone/use of wound vac/non-weight bearing vs amputation of the involved bone.     -Patient sisters well discuss further with Ever and notify my office of his decision.  I have discussed with her I will place the surgery order now and have referred him to infectious disease.    -I will asked my office to coordinate surgery at [facility].  We will plan on hospital admit with transfer to TCU.    LINO [facility]      HPI: Scheduled today to review the patient's right rear foot MRI with his sister.    Past Medical History:   Diagnosis Date     A-fib (H)      MESHA (acute kidney injury) (H)      Anemia      Atopic keratoconjunctivitis      Atrial fibrillation (H)      Brian Moe: 8/2011 Cardioversion; CHADS2 VASC = 5; he is on warfarin and sotalol      Atrial flutter (H)      Mcgarry's esophagus 01/01/2012    per note of Dr. Tavo Mike of [facility]     Bilateral lower leg cellulitis 08/31/2018     BPH (benign prostatic hyperplasia)      Candidiasis of perineum 01/03/2018     Carotid stenosis      Carotid stenosis, asymptomatic, right      CHF (congestive heart failure) (H)      Cholecystitis, acute 08/18/2019     Chronic systolic heart failure (H)      Chronic venous stasis dermatitis      Closed nondisplaced intertrochanteric fracture of left femur with routine healing, subsequent encounter 05/18/2022     Clostridium difficile colitis      COPD (chronic obstructive pulmonary disease) (H)      Coronary artery disease due to lipid rich plaque 2000    CABG x2     Diabetes (H)      Diabetic ulcer of both feet (H) 10/31/2017     Dyslexia      Dyslipidemia, goal LDL below 70 2000     Epistaxis      Essential hypertension      Gangrene of left foot (H)      GERD (gastroesophageal reflux disease)      HLD (hyperlipidemia)      HTN (hypertension)      Hyponatremia      Ischemic heart disease      Lymphedema      Mild cognitive impairment      MRSA (methicillin resistant Staphylococcus aureus)      Neuropathy      Non-STEMI (non-ST elevated myocardial infarction) (H)      Occlusion and stenosis of right carotid artery      Osteoarthrosis      Osteomyelitis of ankle and foot (H)      Other atopic dermatitis      PAD (peripheral artery disease) (H)      Peripheral vascular disease (H)      Pneumonia 06/26/2018     Polyneuropathy due to type 2 diabetes mellitus (H)      Pressure ulcer, heel     Bilateral     Renal insufficiency      Type 2 diabetes mellitus, without long-term current use of insulin (H)      Unable to function independently 11/13/2017       Past Surgical History:   Procedure Laterality Date     AMPUTATE FOOT Left 11/05/2017    Procedure: LEFT TRANSMETATARSAL AMPUTATION;   Surgeon: Ever Wick MD;  Location: [facility] OR;  Service:      AMPUTATE FOOT Right 4/27/2023    Procedure: Transmetatarsal amputation right foot;  Surgeon: Miguelangel Spears DPM;  Location: [facility]     BYPASS GRAFT ARTERY CORONARY N/A 03/06/2000    SVG to OM1, SVG to PDA     BYPASS GRAFT ARTERY CORONARY N/A 10/04/2018    redo CABG due to graft failure     CABG MEASURES GRP       CARDIOVERSION  08/25/2011     CV CORONARY ANGIOGRAM N/A 10/01/2018    Procedure: Coronary Angiogram;  Surgeon: Miki Mac MD;  Location: [facility] Lab;  Service:      INGUINAL HERNIA REPAIR Bilateral 1969    and 1979     IR EXTREMITY ANGIOGRAM BILATERAL  11/3/2017     IR LOWER EXTREMITY ANGIOGRAM LEFT  3/10/2023     IR LOWER EXTREMITY ANGIOGRAM RIGHT  1/24/2023     LAPAROSCOPIC CHOLECYSTECTOMY N/A 08/18/2019    Procedure: CHOLECYSTECTOMY, LAPAROSCOPIC;  Surgeon: Stewart Fountain MD;  [facility];  Service: General     LENGTHEN TENDON ACHILLES Right 4/27/2023    Procedure: with Achilles tendon lengthening, debridement of right heel ulceration.;  Surgeon: Miguelangel Spears DPM;  Location: [facility]       Allergies   Allergen Reactions     Doxycycline Rash     Latex Rash     Penicillin V Rash     Reaction occurred as a child. Patient tolerated Zosyn 6/2018, Cefazolin 10/2018, and has also tolerated Augmentin.     Penicillins Rash     Reaction occurred as a child. Patient tolerated Zosyn 6/2018, Cefazolin 10/2018, and has also tolerated Augmentin.         Current Outpatient Medications:      acetaminophen (TYLENOL) 325 MG tablet, 1000 mg tid scheduled and 500 mg bid prn, Disp: , Rfl:      albuterol (PROAIR HFA/PROVENTIL HFA/VENTOLIN HFA) 108 (90 Base) MCG/ACT inhaler, Inhale 2 puffs into the lungs 2 times daily And q4h PRN, Disp: , Rfl:      cetirizine (ZYRTEC) 10 MG tablet, Take 10 mg by mouth daily, Disp: , Rfl:      clindamycin (CLEOCIN) 300 MG capsule, Take 1 capsule  (300 mg) by mouth 4 times daily for 10 days, Disp: 40 capsule, Rfl: 0     clopidogrel (PLAVIX) 75 MG tablet, Take 1 tablet (75 mg) by mouth daily Start taking medication the day after the procedure., Disp: 90 tablet, Rfl: 1     furosemide (LASIX) 40 MG tablet, Take 40 mg by mouth daily, Disp: , Rfl:      gabapentin (NEURONTIN) 100 MG capsule, Take 100 mg by mouth 2 times daily, Disp: , Rfl:      glipiZIDE (GLUCOTROL) 10 MG tablet, Take 5 mg by mouth 2 times daily (before meals), Disp: , Rfl:      ketoconazole (NIZORAL) 2 % external shampoo, Apply topically twice a week Mon and Fri., Disp: , Rfl:      liraglutide (VICTOZA) 18 MG/3ML solution, Inject 0.6 mg Subcutaneous daily, Disp: , Rfl:      losartan (COZAAR) 25 MG tablet, Take 25 mg by mouth daily, Disp: , Rfl:      mineral oil-hydrophilic petrolatum (AQUAPHOR) external ointment, Apply topically 2 times daily, Disp: , Rfl:      mineral oil-hydrophilic petrolatum (AQUAPHOR) external ointment, Apply topically 2 times daily, Disp: , Rfl:      omeprazole (PRILOSEC) 20 MG CR capsule, Take 20 mg by mouth daily, Disp: , Rfl:      polyethylene glycol (MIRALAX) 17 g packet, Take 1 packet by mouth daily, Disp: , Rfl:      rivaroxaban ANTICOAGULANT (XARELTO) 15 MG TABS tablet, Take by mouth daily (with dinner), Disp: , Rfl:      senna-docusate (SENOKOT-S/PERICOLACE) 8.6-50 MG tablet, Take 1 tablet by mouth 2 times daily And BID PRN, Disp: , Rfl:      simvastatin (ZOCOR) 40 MG tablet, Take 40 mg by mouth At Bedtime, Disp: , Rfl:      spironolactone (ALDACTONE) 25 MG tablet, Take 25 mg by mouth daily, Disp: , Rfl:      tamsulosin (FLOMAX) 0.4 MG capsule, Take 0.4 mg by mouth daily, Disp: , Rfl:   No current facility-administered medications for this visit.    Review of Systems - 10 point Review of Systems is negative except for osteomyelitis right calcaneus which is noted in HPI.      Imaging:     MR Ankle Right w/o & w Contrast    Result Date: 5/11/2023  EXAM: MR ANKLE RIGHT  W/O and W CONTRAST LOCATION: [facility] DATE/TIME: 5/11/2023 6:35 PM CDT INDICATION: Osteomyelitis of calcaneus COMPARISON: None. TECHNIQUE: Routine. Additional postgadolinium T1 sequences were obtained. IV CONTRAST: 8 ml Gadavist FINDINGS: TENDONS: -Peroneal: Peroneus longus and brevis tendons are intact. No tendinopathy or tenosynovitis. No subluxation. -Medial: Posterior tibialis tendon is intact. No tendinopathy or tenosynovitis. Flexor digitorum longus and flexor hallucis longus tendons are normal. No tenosynovitis. -Anterior: Anterior tibialis, extensor hallucis longus, and extensor digitorum longus tendons are normal. No tenosynovitis. -Achilles: Mild Achilles tendinopathy without tearing. LIGAMENTS: -Anterior talofibular ligament: Intact. -Calcaneofibular ligament: Intact. -Posterior talofibular ligament: Intact. -Syndesmotic inferior tibiofibular ligaments: Intact. -Deltoid ligament complex: Intact. -Spring ligament complex: Intact. JOINTS AND BONES: -There is a soft tissue ulceration along the inferolateral aspect of the hindfoot which approaches the periosteal margin of the calcaneus where there is bone signal abnormality worrisome for very early developing osteomyelitis confined to the superficial  margin identified on series 5 and 12, images 10 and 9 respectively and also seen on series 8, 9 and 11, image 5. No significant bone destruction is identified. No effusion to suggest septic arthropathy. No evidence for fracture. SOFT TISSUES: -Plantar fascia: Intact. No acute fasciitis or tear. -Sinus tarsi and tarsal tunnel: Normal. -Muscles: Normal. -Extensive but nonspecific edema or cellulitis about the lower leg. Previously described ulceration along the inferolateral aspect of the hindfoot. No evidence for foreign body.     IMPRESSION: 1.  Bone signal abnormality and abnormal enhancement worrisome for early developing osteomyelitis confined to the superficial margin of the  "inferolateral aspect of the calcaneus which is seen at the base of an ulceration. 2.  Surrounding edema or cellulitis about this region, as well as around the lower leg and ankle but no evidence for organized fluid collection to suggest abscess. 3.  No evidence for fracture. 4.  No significant tendinous or ligamentous pathology. There is mild Achilles tendinopathy. 5.  Degenerative change at the midfoot, incompletely visualized at the navicular cuneiform and TMT joints.    POC US Guidance Needle Placement    Result Date: 4/27/2023  Ultrasound was performed as guidance to an anesthesia procedure.  Click \"PACS images\" hyperlink below to view any stored images.  For specific procedure details, view procedure note authored by anesthesia.           "

## 2023-05-12 NOTE — TELEPHONE ENCOUNTER
"[facility] Telephone Encounter  Chief Complaint   Patient presents with     Pre-Op Exam   Ever Crane is a 78 year old (1945). Nursing team called/messaged today to report this patient needs preop orders for a \"last minute\" procedure that has been scheduled on Monday 5/15. Apparently, patient will undergo I&D of right heel w/ partial calcanectomy.     ASSESSMENT/PLAN  Prompt record review shows recent preop clearance done by Isabel Stephens on 4/20/23. This stands. Surgeon has given orders for Xarelto/Plavix holding. More orders for safe procedure are included here and were given to nursing.     Orders:      Electronically signed by:  Dr. Yanna Dozier, APRN CNP DNP        "

## 2023-05-12 NOTE — PROGRESS NOTES
Spoke with [facility].  Gave verbal order to hold xarelto and plavix starting now and ok to resume day after surgery.

## 2023-05-12 NOTE — PROGRESS NOTES
Surgery Scheduled    Confirmed surgery date/time with pt's sister.  She is coordinating transportation to [facility] for surgery.    Surgery/Procedure: INCISION AND DRAINAGE, right heel with partial calcanectomy    CPT: 75182     Equipment: pulse lavage, osteotome    Location: [facility]:  [address] [place] [id number] (phone: [phone], Fax: [phone])    Date: 5/15/23    Time: 3:45 PM    Admission Type: Outpatient    Surgeon: Dr. Spears    OR Confirmed & :  Yes with Heather on 5/12/2023    Entered on provider calendar:  Yes    Post Op: 5/24/23 840 AM    Wound Vac Needed: No    Home Care Needed: No [facility]    Blood Thinners Addressed: Xarelto - routing message to nursing to address    Stress Test Clearance: NO

## 2023-05-14 ENCOUNTER — LAB REQUISITION (OUTPATIENT)
Dept: LAB | Facility: CLINIC | Age: 78
End: 2023-05-14
Payer: MEDICARE

## 2023-05-14 DIAGNOSIS — E11.9 TYPE 2 DIABETES MELLITUS WITHOUT COMPLICATIONS (H): ICD-10-CM

## 2023-05-15 ENCOUNTER — TELEPHONE (OUTPATIENT)
Dept: VASCULAR SURGERY | Facility: CLINIC | Age: 78
End: 2023-05-15
Payer: MEDICARE

## 2023-05-15 ENCOUNTER — ANESTHESIA EVENT (OUTPATIENT)
Dept: SURGERY | Facility: HOSPITAL | Age: 78
End: 2023-05-15
Payer: MEDICARE

## 2023-05-15 ENCOUNTER — HOSPITAL ENCOUNTER (OUTPATIENT)
Facility: HOSPITAL | Age: 78
Discharge: NURSING, REHAB OR LONG-TERM CARE | End: 2023-05-15
Attending: PODIATRIST | Admitting: PODIATRIST
Payer: MEDICARE

## 2023-05-15 ENCOUNTER — ANESTHESIA (OUTPATIENT)
Dept: SURGERY | Facility: HOSPITAL | Age: 78
End: 2023-05-15
Payer: MEDICARE

## 2023-05-15 VITALS
HEART RATE: 60 BPM | DIASTOLIC BLOOD PRESSURE: 42 MMHG | SYSTOLIC BLOOD PRESSURE: 119 MMHG | WEIGHT: 185 LBS | RESPIRATION RATE: 18 BRPM | OXYGEN SATURATION: 95 % | TEMPERATURE: 98.2 F | BODY MASS INDEX: 26.54 KG/M2

## 2023-05-15 DIAGNOSIS — M86.9 OSTEOMYELITIS OF ANKLE AND FOOT (H): ICD-10-CM

## 2023-05-15 LAB
GLUCOSE BLDC GLUCOMTR-MCNC: 114 MG/DL (ref 70–99)
GLUCOSE BLDC GLUCOMTR-MCNC: 125 MG/DL (ref 70–99)
HBA1C MFR BLD: 6.7 %

## 2023-05-15 PROCEDURE — 28120 PART REMOVAL OF ANKLE/HEEL: CPT | Mod: 78 | Performed by: PODIATRIST

## 2023-05-15 PROCEDURE — 250N000011 HC RX IP 250 OP 636: Performed by: PODIATRIST

## 2023-05-15 PROCEDURE — 87176 TISSUE HOMOGENIZATION CULTR: CPT | Performed by: PODIATRIST

## 2023-05-15 PROCEDURE — 11044 DBRDMT BONE 1ST 20 SQ CM/<: CPT | Mod: 78 | Performed by: PODIATRIST

## 2023-05-15 PROCEDURE — 11046 DBRDMT MUSC&/FSCA EA ADDL: CPT | Mod: 78 | Performed by: PODIATRIST

## 2023-05-15 PROCEDURE — 250N000009 HC RX 250: Performed by: PODIATRIST

## 2023-05-15 PROCEDURE — 250N000009 HC RX 250: Performed by: NURSE ANESTHETIST, CERTIFIED REGISTERED

## 2023-05-15 PROCEDURE — 11043 DBRDMT MUSC&/FSCA 1ST 20/<: CPT | Mod: 78 | Performed by: PODIATRIST

## 2023-05-15 PROCEDURE — 258N000003 HC RX IP 258 OP 636: Performed by: ANESTHESIOLOGY

## 2023-05-15 PROCEDURE — 82962 GLUCOSE BLOOD TEST: CPT

## 2023-05-15 PROCEDURE — 83036 HEMOGLOBIN GLYCOSYLATED A1C: CPT | Mod: ORL | Performed by: FAMILY MEDICINE

## 2023-05-15 PROCEDURE — 250N000011 HC RX IP 250 OP 636: Performed by: NURSE ANESTHETIST, CERTIFIED REGISTERED

## 2023-05-15 PROCEDURE — 87205 SMEAR GRAM STAIN: CPT | Performed by: PODIATRIST

## 2023-05-15 PROCEDURE — 258N000001 HC RX 258: Performed by: PODIATRIST

## 2023-05-15 PROCEDURE — 999N000141 HC STATISTIC PRE-PROCEDURE NURSING ASSESSMENT: Performed by: PODIATRIST

## 2023-05-15 PROCEDURE — 710N000012 HC RECOVERY PHASE 2, PER MINUTE: Performed by: PODIATRIST

## 2023-05-15 PROCEDURE — 999N000248 HC STATISTIC IV INSERT WITH US BY RN

## 2023-05-15 PROCEDURE — 87075 CULTR BACTERIA EXCEPT BLOOD: CPT | Performed by: PODIATRIST

## 2023-05-15 PROCEDURE — 36415 COLL VENOUS BLD VENIPUNCTURE: CPT | Mod: ORL | Performed by: FAMILY MEDICINE

## 2023-05-15 PROCEDURE — 87077 CULTURE AEROBIC IDENTIFY: CPT | Performed by: PODIATRIST

## 2023-05-15 PROCEDURE — P9603 ONE-WAY ALLOW PRORATED MILES: HCPCS | Mod: ORL | Performed by: FAMILY MEDICINE

## 2023-05-15 PROCEDURE — 11047 DBRDMT BONE EACH ADDL: CPT | Mod: 78 | Performed by: PODIATRIST

## 2023-05-15 PROCEDURE — 370N000017 HC ANESTHESIA TECHNICAL FEE, PER MIN: Performed by: PODIATRIST

## 2023-05-15 PROCEDURE — 360N000076 HC SURGERY LEVEL 3, PER MIN: Performed by: PODIATRIST

## 2023-05-15 PROCEDURE — 272N000001 HC OR GENERAL SUPPLY STERILE: Performed by: PODIATRIST

## 2023-05-15 RX ORDER — CLINDAMYCIN PHOSPHATE 900 MG/50ML
900 INJECTION, SOLUTION INTRAVENOUS SEE ADMIN INSTRUCTIONS
Status: DISCONTINUED | OUTPATIENT
Start: 2023-05-15 | End: 2023-05-15 | Stop reason: HOSPADM

## 2023-05-15 RX ORDER — FENTANYL CITRATE 50 UG/ML
INJECTION, SOLUTION INTRAMUSCULAR; INTRAVENOUS PRN
Status: DISCONTINUED | OUTPATIENT
Start: 2023-05-15 | End: 2023-05-15

## 2023-05-15 RX ORDER — OXYCODONE HYDROCHLORIDE 5 MG/1
10 TABLET ORAL
Status: DISCONTINUED | OUTPATIENT
Start: 2023-05-15 | End: 2023-05-15 | Stop reason: HOSPADM

## 2023-05-15 RX ORDER — LIDOCAINE HYDROCHLORIDE 10 MG/ML
INJECTION, SOLUTION INFILTRATION; PERINEURAL PRN
Status: DISCONTINUED | OUTPATIENT
Start: 2023-05-15 | End: 2023-05-15

## 2023-05-15 RX ORDER — OXYCODONE HYDROCHLORIDE 5 MG/1
5-10 TABLET ORAL EVERY 6 HOURS PRN
Qty: 12 TABLET | Refills: 0 | Status: SHIPPED | OUTPATIENT
Start: 2023-05-15 | End: 2023-06-07

## 2023-05-15 RX ORDER — NALOXONE HYDROCHLORIDE 1 MG/ML
0.4 INJECTION INTRAMUSCULAR; INTRAVENOUS; SUBCUTANEOUS
Status: DISCONTINUED | OUTPATIENT
Start: 2023-05-15 | End: 2023-05-15 | Stop reason: HOSPADM

## 2023-05-15 RX ORDER — PROPOFOL 10 MG/ML
INJECTION, EMULSION INTRAVENOUS PRN
Status: DISCONTINUED | OUTPATIENT
Start: 2023-05-15 | End: 2023-05-15

## 2023-05-15 RX ORDER — FENTANYL CITRATE 50 UG/ML
50 INJECTION, SOLUTION INTRAMUSCULAR; INTRAVENOUS EVERY 5 MIN PRN
Status: DISCONTINUED | OUTPATIENT
Start: 2023-05-15 | End: 2023-05-15 | Stop reason: HOSPADM

## 2023-05-15 RX ORDER — ONDANSETRON 4 MG/1
4 TABLET, ORALLY DISINTEGRATING ORAL EVERY 30 MIN PRN
Status: DISCONTINUED | OUTPATIENT
Start: 2023-05-15 | End: 2023-05-15 | Stop reason: HOSPADM

## 2023-05-15 RX ORDER — OXYCODONE HYDROCHLORIDE 5 MG/1
5 TABLET ORAL
Status: DISCONTINUED | OUTPATIENT
Start: 2023-05-15 | End: 2023-05-15 | Stop reason: HOSPADM

## 2023-05-15 RX ORDER — PROPOFOL 10 MG/ML
INJECTION, EMULSION INTRAVENOUS CONTINUOUS PRN
Status: DISCONTINUED | OUTPATIENT
Start: 2023-05-15 | End: 2023-05-15

## 2023-05-15 RX ORDER — SODIUM CHLORIDE, SODIUM LACTATE, POTASSIUM CHLORIDE, CALCIUM CHLORIDE 600; 310; 30; 20 MG/100ML; MG/100ML; MG/100ML; MG/100ML
INJECTION, SOLUTION INTRAVENOUS CONTINUOUS
Status: DISCONTINUED | OUTPATIENT
Start: 2023-05-15 | End: 2023-05-15 | Stop reason: HOSPADM

## 2023-05-15 RX ORDER — BUPIVACAINE HYDROCHLORIDE 5 MG/ML
INJECTION, SOLUTION PERINEURAL PRN
Status: DISCONTINUED | OUTPATIENT
Start: 2023-05-15 | End: 2023-05-15 | Stop reason: HOSPADM

## 2023-05-15 RX ORDER — ONDANSETRON 2 MG/ML
INJECTION INTRAMUSCULAR; INTRAVENOUS PRN
Status: DISCONTINUED | OUTPATIENT
Start: 2023-05-15 | End: 2023-05-15

## 2023-05-15 RX ORDER — NALOXONE HYDROCHLORIDE 1 MG/ML
0.2 INJECTION INTRAMUSCULAR; INTRAVENOUS; SUBCUTANEOUS
Status: DISCONTINUED | OUTPATIENT
Start: 2023-05-15 | End: 2023-05-15 | Stop reason: HOSPADM

## 2023-05-15 RX ORDER — FENTANYL CITRATE 50 UG/ML
25 INJECTION, SOLUTION INTRAMUSCULAR; INTRAVENOUS EVERY 5 MIN PRN
Status: DISCONTINUED | OUTPATIENT
Start: 2023-05-15 | End: 2023-05-15 | Stop reason: HOSPADM

## 2023-05-15 RX ORDER — LIDOCAINE 40 MG/G
CREAM TOPICAL
Status: DISCONTINUED | OUTPATIENT
Start: 2023-05-15 | End: 2023-05-15 | Stop reason: HOSPADM

## 2023-05-15 RX ORDER — FENTANYL CITRATE 50 UG/ML
25-100 INJECTION, SOLUTION INTRAMUSCULAR; INTRAVENOUS
Status: DISCONTINUED | OUTPATIENT
Start: 2023-05-15 | End: 2023-05-15 | Stop reason: HOSPADM

## 2023-05-15 RX ORDER — CLINDAMYCIN PHOSPHATE 900 MG/50ML
900 INJECTION, SOLUTION INTRAVENOUS
Status: DISCONTINUED | OUTPATIENT
Start: 2023-05-15 | End: 2023-05-15 | Stop reason: HOSPADM

## 2023-05-15 RX ORDER — ONDANSETRON 2 MG/ML
4 INJECTION INTRAMUSCULAR; INTRAVENOUS EVERY 30 MIN PRN
Status: DISCONTINUED | OUTPATIENT
Start: 2023-05-15 | End: 2023-05-15 | Stop reason: HOSPADM

## 2023-05-15 RX ADMIN — PROPOFOL 100 MCG/KG/MIN: 10 INJECTION, EMULSION INTRAVENOUS at 16:18

## 2023-05-15 RX ADMIN — FENTANYL CITRATE 25 MCG: 50 INJECTION, SOLUTION INTRAMUSCULAR; INTRAVENOUS at 16:25

## 2023-05-15 RX ADMIN — FENTANYL CITRATE 25 MCG: 50 INJECTION, SOLUTION INTRAMUSCULAR; INTRAVENOUS at 16:30

## 2023-05-15 RX ADMIN — ONDANSETRON 4 MG: 2 INJECTION INTRAMUSCULAR; INTRAVENOUS at 16:25

## 2023-05-15 RX ADMIN — LIDOCAINE HYDROCHLORIDE 3 ML: 10 INJECTION, SOLUTION INFILTRATION; PERINEURAL at 16:18

## 2023-05-15 RX ADMIN — CLINDAMYCIN PHOSPHATE 900 MG: 900 INJECTION, SOLUTION INTRAVENOUS at 16:18

## 2023-05-15 RX ADMIN — SODIUM CHLORIDE, POTASSIUM CHLORIDE, SODIUM LACTATE AND CALCIUM CHLORIDE: 600; 310; 30; 20 INJECTION, SOLUTION INTRAVENOUS at 16:14

## 2023-05-15 RX ADMIN — PROPOFOL 20 MG: 10 INJECTION, EMULSION INTRAVENOUS at 16:18

## 2023-05-15 ASSESSMENT — COPD QUESTIONNAIRES: COPD: 1

## 2023-05-15 ASSESSMENT — ACTIVITIES OF DAILY LIVING (ADL)
ADLS_ACUITY_SCORE: 25

## 2023-05-15 NOTE — OP NOTE
Date: 5/15/2023     Surgeon: ANDRES Spears DPM    Preoperative diagnosis:   1.  Osteomyelitis right calcaneus  2.  Ulceration right heel  3.  Ulceration right foot    Postoperative diagnosis: Same    Procedure:   1. Incision and drainage right foot foot  2.  Partial calcanectomy right  3.  Excisional Debridement ulceration right foot into muscle  4.  Excisional debridement ulceration right heel into bone    Anesthesia: MAC with Block     Hemostasis: None    Pathology:   ID Type Source Tests Collected by Time Destination   A : Right Foot Bone Bone Curetting Foot, Right ANAEROBIC BACTERIAL CULTURE ROUTINE, GRAM STAIN, AEROBIC BACTERIAL CULTURE ROUTINE Miguelangel Spears DPM 5/15/2023  4:42 PM         Injectables: 0.5% Marcaine plain    Materials: None    Complications: None    Blood loss: 20 cc    Findings: Patient presents for operative intervention for osteomyelitis of the right calcaneus.  While the patient was in preop this afternoon there was noted to be nonviable tissue about the distal right foot.  I discussed today surgical procedure to include removal of bone from the right heel along with removal of nonviable tissue on the distal right midfoot.  Risks include not limited to need for additional surgical intervention and loss of limb.  Consent has been obtained.  Patient questions invited and answered, including appropriate risk, benefits and complications. No guarantees given or implied. Patient has been NPO.    Description: Patient was brought to the operating room and placed on the table in supine position. IV-sedation was administered by the anesthesia department.  Injection of 0.5% Marcaine plain was administered to surgical site right foot foot. The foot was then prepped and draped in usual aseptic manner and the following procedure was then performed: Attention was directed to the plantar aspect of the right heel where a ulceration was noted.  A #15 blade was then used to make a full-thickness incision  "which probe down to bone.  At this time all nonviable tissue was excisionally debrided with a #15 blade removed from the surgical site.  A rongeur and osteotome and mallet were then used to resect bone from the calcaneus which was then sent for aerobic/anaerobic culture.  1000 cc pulse lavage was then used to irrigate the surgical site.    The resulting right heel ulceration measured 7 x 6 x 2 cm. Sharp, excisional debridement was performed with a #15 blade into bone debriding 42 sq cm.     Attention was then directed to the distal aspect of the right midfoot where significant amount of nonviable tissue was identified.  A #10 blade was then used to excisionally debride the nonviable tissue into level of muscle.  The nonviable tissue was then removed from the surgical site.    The resulting ulceration measured 17 x 6 x 1 cm.  Sharp, excisional debridement was performed with a #10 blade into muscle debriding 102 cm .    The wounds were packed with 1/4\" gauze, and dressings consistent of 4x4's, ABD, kerlix roll and an ace wrap.     The patient appeared to tolerate all the procedures and anesthesia well without apparent complications. Patient was transported from the operating room to the recovery room with vital signs stable and neurovascular status as it was pre-operatively to the right foot. Patient to be discharged to TCU per anesthesia protocol.  He should remain nonweightbearing on the right foot and keep the right foot elevated above his waist on 3-4 pillows at all times.  Orders placed for wound VAC application to the right foot and heel tomorrow.  I like to see him for follow-up in approximately 2 weeks.    Miguelangel Spears, LINO          "

## 2023-05-15 NOTE — INTERVAL H&P NOTE
"I have reviewed the surgical (or preoperative) H&P that is linked to this encounter, and examined the patient. There are no significant changes    Clinical Conditions Present on Arrival:  Clinically Significant Risk Factors Present on Admission                # Drug Induced Coagulation Defect: home medication list includes an anticoagulant medication  # Drug Induced Platelet Defect: home medication list includes an antiplatelet medication  # DMII: A1C = 6.7 % (Ref range: <5.7 %) within past 6 months  # Overweight: Estimated body mass index is 26.54 kg/m  as calculated from the following:    Height as of 5/11/23: 1.778 m (5' 10\").    Weight as of this encounter: 83.9 kg (185 lb).       "

## 2023-05-15 NOTE — ANESTHESIA PREPROCEDURE EVALUATION
Anesthesia Pre-Procedure Evaluation    Patient: Ever Crane   MRN: [id number] : 1945        Procedure : Procedure(s):  INCISION AND DRAINAGE, right heel with partial calcanectomy          Past Medical History:   Diagnosis Date     A-fib (H)      MESHA (acute kidney injury) (H)      Anemia      Atopic keratoconjunctivitis      Atrial fibrillation (H)     Brian Moe: 2011 Cardioversion; CHADS2 VASC = 5; he is on warfarin and sotalol      Atrial flutter (H)      Mcgarry's esophagus 2012    per note of Dr. Tavo Mike of [facility]     Bilateral lower leg cellulitis 2018     BPH (benign prostatic hyperplasia)      Candidiasis of perineum 2018     Carotid stenosis      Carotid stenosis, asymptomatic, right      CHF (congestive heart failure) (H)      Cholecystitis, acute 2019     Chronic systolic heart failure (H)      Chronic venous stasis dermatitis      Closed nondisplaced intertrochanteric fracture of left femur with routine healing, subsequent encounter 2022     Clostridium difficile colitis      COPD (chronic obstructive pulmonary disease) (H)      Coronary artery disease due to lipid rich plaque 2000    CABG x2     Diabetes (H)      Diabetic ulcer of both feet (H) 10/31/2017     Dyslexia      Dyslipidemia, goal LDL below 70      Epistaxis      Essential hypertension      Gangrene of left foot (H)      GERD (gastroesophageal reflux disease)      HLD (hyperlipidemia)      HTN (hypertension)      Hyponatremia      Ischemic heart disease      Lymphedema      Mild cognitive impairment      MRSA (methicillin resistant Staphylococcus aureus)      Neuropathy      Non-STEMI (non-ST elevated myocardial infarction) (H)      Occlusion and stenosis of right carotid artery      Osteoarthrosis      Osteomyelitis of ankle and foot (H)      Other atopic dermatitis      PAD (peripheral artery disease) (H)      Peripheral vascular disease (H)      Pneumonia 2018     Polyneuropathy due to  type 2 diabetes mellitus (H)      Pressure ulcer, heel     Bilateral     Renal insufficiency      Type 2 diabetes mellitus, without long-term current use of insulin (H)      Unable to function independently 11/13/2017      Past Surgical History:   Procedure Laterality Date     AMPUTATE FOOT Left 11/05/2017    Procedure: LEFT TRANSMETATARSAL AMPUTATION;  Surgeon: Ever Wick MD;  Location: [facility];  Service:      AMPUTATE FOOT Right 4/27/2023    Procedure: Transmetatarsal amputation right foot;  Surgeon: Miguelangel Spears DPM;  Location: [facility]     BYPASS GRAFT ARTERY CORONARY N/A 03/06/2000    SVG to OM1, SVG to PDA     BYPASS GRAFT ARTERY CORONARY N/A 10/04/2018    redo CABG due to graft failure     CABG MEASURES GRP       CARDIOVERSION  08/25/2011     CV CORONARY ANGIOGRAM N/A 10/01/2018    Procedure: Coronary Angiogram;  Surgeon: Miki Mac MD;  Location: [facility];  Service:      INGUINAL HERNIA REPAIR Bilateral 1969    and 1979     IR EXTREMITY ANGIOGRAM BILATERAL  11/3/2017     IR LOWER EXTREMITY ANGIOGRAM LEFT  3/10/2023     IR LOWER EXTREMITY ANGIOGRAM RIGHT  1/24/2023     LAPAROSCOPIC CHOLECYSTECTOMY N/A 08/18/2019    Procedure: CHOLECYSTECTOMY, LAPAROSCOPIC;  Surgeon: Stewart Fountain MD;  Location: [facility];  Service: General     LENGTHEN TENDON ACHILLES Right 4/27/2023    Procedure: with Achilles tendon lengthening, debridement of right heel ulceration.;  Surgeon: Miguelangel Spears DPM;  Location: [facility]      Allergies   Allergen Reactions     Cranberry Extract Itching and Rash     Doxycycline Rash     Latex Rash     Penicillin V Rash     Reaction occurred as a child. Patient tolerated Zosyn 6/2018, Cefazolin 10/2018, and has also tolerated Augmentin.     Penicillins Rash     Reaction occurred as a child. Patient tolerated Zosyn 6/2018, Cefazolin 10/2018, and has also tolerated Augmentin.      Social History     Tobacco Use      Smoking status: Former     Packs/day: 1.00     Years: 36.00     Pack years: 36.00     Types: Cigarettes     Start date: 1964     Quit date: 2000     Years since quittin.0     Smokeless tobacco: Never   Vaping Use     Vaping status: Not on file   Substance Use Topics     Alcohol use: Not Currently     Alcohol/week: 5.0 standard drinks of alcohol     Types: 1 Cans of beer, 4 Standard drinks or equivalent per week      Wt Readings from Last 1 Encounters:   05/15/23 83.9 kg (185 lb)        Anesthesia Evaluation   Pt has had prior anesthetic.     No history of anesthetic complications       ROS/MED HX  ENT/Pulmonary:     (+) COPD,  (-) recent URI   Neurologic:     (+) peripheral neuropathy,     Cardiovascular:     (+) Dyslipidemia hypertension-Peripheral Vascular Disease-- Carotid Stenosis and Other. CAD -past MI CABG--CHF Last EF: 60  (-) angina and angina   METS/Exercise Tolerance: >4 METS    Hematologic:  - neg hematologic  ROS     Musculoskeletal:   (+) arthritis,     GI/Hepatic:     (+) GERD, Asymptomatic on medication, esophageal disease,     Renal/Genitourinary:     (+) renal disease, type: CRI, BPH,     Endo:     (+) type II DM, Using insulin,  (-) Type I DM   Psychiatric/Substance Use:  - neg psychiatric ROS     Infectious Disease:     (+) MRSA,     Malignancy:  - neg malignancy ROS     Other:  - neg other ROS          Physical Exam    Airway  airway exam normal      Mallampati: II   TM distance: > 3 FB   Neck ROM: full   Mouth opening: > 3 cm    Respiratory Devices and Support         Dental           Cardiovascular   cardiovascular exam normal          Pulmonary   pulmonary exam normal                OUTSIDE LABS:  CBC:   Lab Results   Component Value Date    WBC 10.2 2023    WBC 9.7 2023    HGB 9.6 (L) 2023    HGB 11.7 (L) 2023    HCT 30.9 (L) 2023    HCT 38.5 (L) 2023     2023     2023     BMP:   Lab Results   Component Value Date      05/03/2023     04/12/2023    POTASSIUM 4.6 05/03/2023    POTASSIUM 4.8 04/12/2023    CHLORIDE 101 05/03/2023    CHLORIDE 102 04/12/2023    CO2 24 05/03/2023    CO2 26 04/12/2023    BUN 27.3 (H) 05/03/2023    BUN 27.1 (H) 04/12/2023    CR 1.59 (H) 05/03/2023    CR 1.28 (H) 04/12/2023     (H) 05/15/2023    GLC 98 05/03/2023     COAGS:   Lab Results   Component Value Date    PTT 36 01/24/2023    INR 1.23 (H) 01/24/2023    FIBR 322 10/04/2018     POC:   Lab Results   Component Value Date     (H) 06/30/2018     HEPATIC:   Lab Results   Component Value Date    ALBUMIN 4.2 07/12/2022    PROTTOTAL 7.3 07/12/2022    ALT 8 (L) 07/12/2022    AST 18 07/12/2022    ALKPHOS 115 07/12/2022    BILITOTAL 0.4 07/12/2022     OTHER:   Lab Results   Component Value Date    PH 7.32 (L) 10/05/2018    LACT 1.6 01/17/2023    A1C 6.7 (H) 05/15/2023    MELODY 9.3 05/03/2023    PHOS 2.4 (L) 08/19/2019    MAG 1.9 08/19/2019    LIPASE 35 08/16/2019    TSH 3.20 07/12/2022    CRP 4.0 (H) 01/03/2019    SED 48 (H) 01/17/2023       Anesthesia Plan    ASA Status:  4   NPO Status:  NPO Appropriate    Anesthesia Type: MAC.   Induction: Propofol.   Maintenance: TIVA.        Consents    Anesthesia Plan(s) and associated risks, benefits, and realistic alternatives discussed. Questions answered and patient/representative(s) expressed understanding.    - Discussed:     - Discussed with:  Patient      - Extended Intubation/Ventilatory Support Discussed: No.      - Patient is DNR/DNI Status: No    Use of blood products discussed: No .     Postoperative Care    Pain management: Peripheral nerve block (Single Shot).        Comments:    Other Comments: Nerve blocks for post op pain control per surgeon request.            Sonu Kim MD

## 2023-05-15 NOTE — ANESTHESIA CARE TRANSFER NOTE
Patient: Ever Crane    Procedure: Procedure(s):  INCISION AND DRAINAGE, right heel with,  partial calcanectomy       Diagnosis: Osteomyelitis of ankle and foot (H) [M86.9]  Diagnosis Additional Information: No value filed.    Anesthesia Type:   General     Note:    Oropharynx: oropharynx clear of all foreign objects and spontaneously breathing  Level of Consciousness: awake  Oxygen Supplementation: room air    Independent Airway: airway patency satisfactory and stable  Dentition: dentition unchanged  Vital Signs Stable: post-procedure vital signs reviewed and stable  Report to RN Given: handoff report given  Destination: P4.          Vitals:  Vitals Value Taken Time   BP     Temp     Pulse     Resp     SpO2         Electronically Signed By: KEVIN John CRNA  May 15, 2023  5:03 PM

## 2023-05-15 NOTE — TELEPHONE ENCOUNTER
Writer spoke with pt's sister Marie & confirmed pt has transportation to the hospital for today's surgery.  She also confirmed the follow up appointment date/time.  She had no further questions at this time.

## 2023-05-15 NOTE — TELEPHONE ENCOUNTER
"Patient's sister, Marie, states she has \"so many questions\" regarding patient's surgery later today and is requesting a call back from Britton. She said she would not go into detail as to what those questions were with writer as she said Britton is expecting her call and she has \"so many other calls to make today\". PH: [phone]  "

## 2023-05-16 LAB
GRAM STAIN RESULT: ABNORMAL

## 2023-05-16 NOTE — ANESTHESIA POSTPROCEDURE EVALUATION
Patient: Ever Crane    Procedure: Procedure(s):  INCISION AND DRAINAGE, right heel with,  partial calcanectomy       Anesthesia Type:  General    Note:     Postop Pain Control: Uneventful            Sign Out: Well controlled pain   PONV: No   Neuro/Psych: Uneventful            Sign Out: Acceptable/Baseline neuro status   Airway/Respiratory: Uneventful            Sign Out: Acceptable/Baseline resp. status   CV/Hemodynamics: Uneventful            Sign Out: Acceptable CV status; No obvious hypovolemia; No obvious fluid overload   Other NRE: NONE   DID A NON-ROUTINE EVENT OCCUR? No           Last vitals:  Vitals Value Taken Time   /42 05/15/23 1850   Temp 36.8  C (98.2  F) 05/15/23 1850   Pulse 60 05/15/23 1850   Resp 18 05/15/23 1850   SpO2 95 % 05/15/23 1850       Electronically Signed By: Sallie Vann MD  May 15, 2023  7:21 PM

## 2023-05-17 NOTE — PROGRESS NOTES
[facility], [facility], [place] Follow Up  PCP & CLINIC: [facility], [address], [place] [id number] / [place] [phone]  Chief Complaint   Patient presents with     Hospital F/U   [place] MRN: [id number]. Place of Service where encounter took place:  [facility] (TCU) [[id number]] Ever Crane  is a 78 year old  (1945), admitted to the above facility from  [facility]. Hospital stay 5/15. Admitted to this facility for  rehab, medical management, and nursing care. HPI information obtained from: facility chart records, facility staff, patient report, [facility] chart review, and Care Everywhere [facility] chart review.     Brief Summary of Hospital Course: Ever presented to [facility] on 5/15 for a planned partial calcanectomy and I/D of right foot/heel for known OM. Tolerated procedure w/ minimal blood loss. Wound vac placed. He is to remain NWB.    Updates since return to transitional care unit: Ever returned to TCU on same-day (5/15) s/p procedure. Today, Ever says his pain is actually better than it was last week.  He has usual numbness and tingling, but does not have the burning.  He is a little upset that he has to have a vacuum, and that he is even required more surgery.  He never wanted to have some of these surgeries in the first place.  He says his appetite is pretty good, and he is eliminated orange juice from his diet in effort to reduce his blood glucose readings.  He denies headaches, shortness of breath, fever, and says his bowel movements are more regular.    CODE STATUS/ADVANCE DIRECTIVES DISCUSSION: CPR/Full code . Patient's living condition: lives alone. ALLERGIES: Cranberry extract, Doxycycline, Latex, Penicillin v, and Penicillins PAST MEDICAL HISTORY:  has a past medical history of A-fib (H), MESHA (acute kidney injury) (H), Anemia, Atopic keratoconjunctivitis, Atrial fibrillation (H), Atrial flutter (H), Mcgarry's esophagus (01/01/2012), Bilateral lower  leg cellulitis (08/31/2018), BPH (benign prostatic hyperplasia), Candidiasis of perineum (01/03/2018), Carotid stenosis, Carotid stenosis, asymptomatic, right, CHF (congestive heart failure) (H), Cholecystitis, acute (08/18/2019), Chronic systolic heart failure (H), Chronic venous stasis dermatitis, Closed nondisplaced intertrochanteric fracture of left femur with routine healing, subsequent encounter (05/18/2022), Clostridium difficile colitis, COPD (chronic obstructive pulmonary disease) (H), Coronary artery disease due to lipid rich plaque (2000), Diabetes (H), Diabetic ulcer of both feet (H) (10/31/2017), Dyslexia, Dyslipidemia, goal LDL below 70 (2000), Epistaxis, Essential hypertension, Gangrene of left foot (H), GERD (gastroesophageal reflux disease), HLD (hyperlipidemia), HTN (hypertension), Hyponatremia, Ischemic heart disease, Lymphedema, Mild cognitive impairment, MRSA (methicillin resistant Staphylococcus aureus), Neuropathy, Non-STEMI (non-ST elevated myocardial infarction) (H), Occlusion and stenosis of right carotid artery, Osteoarthrosis, Osteomyelitis of ankle and foot (H), Other atopic dermatitis, PAD (peripheral artery disease) (H), Peripheral vascular disease (H), Pneumonia (06/26/2018), Polyneuropathy due to type 2 diabetes mellitus (H), Pressure ulcer, heel, Renal insufficiency, Type 2 diabetes mellitus, without long-term current use of insulin (H), and Unable to function independently (11/13/2017).    He has no past medical history of Complication of anesthesia or Malignant hyperthermia.. PAST SURGICAL HISTORY:   has a past surgical history that includes cabg measures grp; IR Extremity Angiogram Bilateral (11/3/2017); Bypass graft artery coronary (N/A, 03/06/2000); Cardioversion (08/25/2011); Amputate foot (Left, 11/05/2017); Inguinal Hernia Repair (Bilateral, 1969); Cv Coronary Angiogram (N/A, 10/01/2018); Laparoscopic cholecystectomy (N/A, 08/18/2019); Bypass graft artery coronary (N/A,  10/04/2018); IR Lower Extremity Angiogram Right (1/24/2023); IR Lower Extremity Angiogram Left (3/10/2023); Amputate foot (Right, 4/27/2023); Lengthen tendon achilles (Right, 4/27/2023); Incision and drainage foot, combined (Right, 5/15/2023); and Amputate foot (Right, 5/15/2023).. FAMILY HISTORY: family history includes CABG in his father; Esophageal Cancer (age of onset: 58.00) in his father; No Known Problems in his grandchild, grandchild, sister, sister, and son; Obesity in his sister; Osteoarthritis in his sister; Sudden Death (age of onset: 85.00) in his mother; Valvular heart disease in his father.. SOCIAL HISTORY:   reports that he quit smoking about 23 years ago. His smoking use included cigarettes. He started smoking about 58 years ago. He has a 36.00 pack-year smoking history. He has never used smokeless tobacco. He reports that he does not currently use alcohol after a past usage of about 5.0 standard drinks of alcohol per week. He reports that he does not use drugs.  Post Discharge Medication Reconciliation Status: discharge medications reconciled and changed, per note/orders.  Current Outpatient Medications   Medication Sig Dispense Refill     dulaglutide (TRULICITY) 0.75 MG/0.5ML pen Inject 0.75 mg Subcutaneous every 7 days       acetaminophen (TYLENOL) 325 MG tablet 1000 mg tid scheduled and 500 mg bid prn       albuterol (PROAIR HFA/PROVENTIL HFA/VENTOLIN HFA) 108 (90 Base) MCG/ACT inhaler Inhale 2 puffs into the lungs 2 times daily And q4h PRN       cetirizine (ZYRTEC) 10 MG tablet Take 5 mg by mouth daily       clopidogrel (PLAVIX) 75 MG tablet Take 1 tablet (75 mg) by mouth daily Start taking medication the day after the procedure. 90 tablet 1     furosemide (LASIX) 40 MG tablet Take 40 mg by mouth daily       gabapentin (NEURONTIN) 100 MG capsule Take 200 mg by mouth 2 times daily       glipiZIDE (GLUCOTROL) 10 MG tablet Take 2.5 mg by mouth 2 times daily (before meals)       ketoconazole  "(NIZORAL) 2 % external shampoo Apply topically twice a week Mon and Fri.       losartan (COZAAR) 25 MG tablet Take 12.5 mg by mouth daily       mineral oil-hydrophilic petrolatum (AQUAPHOR) external ointment Apply topically 2 times daily       omeprazole (PRILOSEC) 20 MG CR capsule Take 20 mg by mouth daily       oxyCODONE (ROXICODONE) 5 MG tablet Take 1-2 tablets (5-10 mg) by mouth every 6 hours as needed for moderate to severe pain 12 tablet 0     polyethylene glycol (MIRALAX) 17 g packet Take 1 packet by mouth daily       rivaroxaban ANTICOAGULANT (XARELTO) 15 MG TABS tablet Take by mouth daily (with dinner)       senna-docusate (SENOKOT-S/PERICOLACE) 8.6-50 MG tablet Take 1 tablet by mouth 2 times daily And BID PRN       simvastatin (ZOCOR) 40 MG tablet Take 40 mg by mouth At Bedtime       spironolactone (ALDACTONE) 25 MG tablet Take 25 mg by mouth daily       tamsulosin (FLOMAX) 0.4 MG capsule Take 0.4 mg by mouth daily       ROS: Limited secondary to cognitive impairment but today pt reports the above and 10 point ROS of systems including Constitutional, Eyes, Respiratory, Cardiovascular, Gastroenterology, Genitourinary, Integumentary, Musculoskeletal, Psychiatric were all negative except for pertinent positives noted in my HPI.    Vitals: /54   Pulse 70   Temp 97.1  F (36.2  C)   Resp 18   Ht 1.778 m (5' 10\")   Wt 83.5 kg (184 lb 1.6 oz)   SpO2 94%   BMI 26.42 kg/m    BGs:    Exam:  GENERAL APPEARANCE: Alert, in no distress, cooperative.   ENT: Mouth/posterior oropharynx intact w/ moist mucous membranes, hearing acuity [place].  EYES: EOM, conjunctivae, lids, pupils and irises normal, PERRL2.   RESP: Respiratory effort good, no respiratory distress, Lung sounds clear/diminished.  On RA.   CV: Auscultation of heart reveals S1, S2, rate controlled and rhythm irregular, no murmur, no rub or gallop, Edema 1+ BLE.  ABDOMEN: Normal bowel sounds, soft, non-tender abdomen, and no masses palpated.  SKIN: " Inspection/Palpation of skin and subcutaneous tissue baseline w/ fragility. No wounds/rashes noted, except incision which is covered at this time with wound VAC.  NEURO: CN II-XII at patient's baseline, sensation baseline PPS.  PSYCH: Insight, judgement, and memory are impaired at baseline, affect and mood are happy/engaged.    Lab/Diagnostic data: Recent labs in [facility] reviewed by me today.     ASSESSMENT/PLAN:  S/P foot surgery, right  Osteomyelitis of right foot, unspecified type (H)  Encounter for management of wound VAC  Type 2 diabetes mellitus with other circulatory complication, with long-term current use of insulin (H)  Polyneuropathy due to type 2 diabetes mellitus (H)  PAD (peripheral artery disease) (H)  Stage 3a chronic kidney disease (H)  Acute on chronic.  Ongoing.    PAD and osteomyelitis is ongoing, and Ever continues on antibiotics status post surgery with now a new wound VAC.    His other chronic conditions appear to be ongoing or controlled at their baseline.  He has made efforts to control his carb intake, and noting that glipizide places him at risk for hypoglycemia.  He did tolerate previous reduction.  We will continue reductions here as noted below.  May consider increasing Trulicity, should more control be needed.    No itching or seasonal allergies noted, will trial reduction in Zyrtec.    Ever will follow-up with surgery accordingly.  Follow up within 1 to 2 weeks or as needed.    Orders:  1. Decrease Glipizide to 2.5mg PO BID/ Dx: DM II.  2. Decrease Zyrtec to 5mg PO Qday. Dx: seasonal allergies.     Electronically signed by:  Dr. Yanna Dozier, APRN CNP DNP

## 2023-05-17 NOTE — ADDENDUM NOTE
Addendum  created 05/17/23 1201 by Miguel Clements MD    Clinical Note Signed, SmartForm saved

## 2023-05-18 ENCOUNTER — TRANSITIONAL CARE UNIT VISIT (OUTPATIENT)
Dept: GERIATRICS | Facility: CLINIC | Age: 78
End: 2023-05-18
Payer: MEDICARE

## 2023-05-18 VITALS
WEIGHT: 184.1 LBS | RESPIRATION RATE: 18 BRPM | SYSTOLIC BLOOD PRESSURE: 104 MMHG | BODY MASS INDEX: 26.36 KG/M2 | HEART RATE: 70 BPM | DIASTOLIC BLOOD PRESSURE: 54 MMHG | TEMPERATURE: 97.1 F | OXYGEN SATURATION: 94 % | HEIGHT: 70 IN

## 2023-05-18 DIAGNOSIS — Z79.4 TYPE 2 DIABETES MELLITUS WITH OTHER CIRCULATORY COMPLICATION, WITH LONG-TERM CURRENT USE OF INSULIN (H): ICD-10-CM

## 2023-05-18 DIAGNOSIS — E11.59 TYPE 2 DIABETES MELLITUS WITH OTHER CIRCULATORY COMPLICATION, WITH LONG-TERM CURRENT USE OF INSULIN (H): ICD-10-CM

## 2023-05-18 DIAGNOSIS — M86.9 OSTEOMYELITIS OF RIGHT FOOT, UNSPECIFIED TYPE (H): ICD-10-CM

## 2023-05-18 DIAGNOSIS — I73.9 PAD (PERIPHERAL ARTERY DISEASE) (H): ICD-10-CM

## 2023-05-18 DIAGNOSIS — E11.42 POLYNEUROPATHY DUE TO TYPE 2 DIABETES MELLITUS (H): ICD-10-CM

## 2023-05-18 DIAGNOSIS — N18.31 STAGE 3A CHRONIC KIDNEY DISEASE (H): ICD-10-CM

## 2023-05-18 DIAGNOSIS — Z46.89 ENCOUNTER FOR MANAGEMENT OF WOUND VAC: ICD-10-CM

## 2023-05-18 DIAGNOSIS — Z98.890 S/P FOOT SURGERY, RIGHT: Primary | ICD-10-CM

## 2023-05-18 PROCEDURE — 99309 SBSQ NF CARE MODERATE MDM 30: CPT | Performed by: NURSE PRACTITIONER
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
## 2023-05-19 LAB
BACTERIA BONE ANAEROBE+AEROBE CULT: ABNORMAL

## 2023-05-20 LAB
BACTERIA BONE ANAEROBE+AEROBE CULT: ABNORMAL
BACTERIA BONE ANAEROBE+AEROBE CULT: ABNORMAL

## 2023-05-24 ENCOUNTER — LAB (OUTPATIENT)
Dept: LAB | Facility: CLINIC | Age: 78
End: 2023-05-24
Payer: MEDICARE

## 2023-05-24 ENCOUNTER — TELEPHONE (OUTPATIENT)
Dept: VASCULAR SURGERY | Facility: CLINIC | Age: 78
End: 2023-05-24

## 2023-05-24 ENCOUNTER — PREP FOR PROCEDURE (OUTPATIENT)
Dept: VASCULAR SURGERY | Facility: CLINIC | Age: 78
End: 2023-05-24

## 2023-05-24 ENCOUNTER — OFFICE VISIT (OUTPATIENT)
Dept: VASCULAR SURGERY | Facility: CLINIC | Age: 78
End: 2023-05-24
Attending: PODIATRIST
Payer: MEDICARE

## 2023-05-24 ENCOUNTER — OFFICE VISIT (OUTPATIENT)
Dept: INFECTIOUS DISEASES | Facility: CLINIC | Age: 78
End: 2023-05-24
Payer: MEDICARE

## 2023-05-24 ENCOUNTER — DOCUMENTATION ONLY (OUTPATIENT)
Dept: VASCULAR SURGERY | Facility: CLINIC | Age: 78
End: 2023-05-24

## 2023-05-24 VITALS — SYSTOLIC BLOOD PRESSURE: 124 MMHG | OXYGEN SATURATION: 96 % | HEART RATE: 62 BPM | DIASTOLIC BLOOD PRESSURE: 64 MMHG

## 2023-05-24 VITALS — DIASTOLIC BLOOD PRESSURE: 64 MMHG | HEART RATE: 62 BPM | SYSTOLIC BLOOD PRESSURE: 120 MMHG | OXYGEN SATURATION: 96 %

## 2023-05-24 DIAGNOSIS — L89.613 PRESSURE ULCER OF RIGHT HEEL, STAGE 3 (H): Primary | ICD-10-CM

## 2023-05-24 DIAGNOSIS — M86.171 ACUTE OSTEOMYELITIS OF RIGHT CALCANEUS (H): ICD-10-CM

## 2023-05-24 DIAGNOSIS — Z79.4 TYPE 2 DIABETES MELLITUS WITH OTHER CIRCULATORY COMPLICATION, WITH LONG-TERM CURRENT USE OF INSULIN (H): ICD-10-CM

## 2023-05-24 DIAGNOSIS — E11.59 TYPE 2 DIABETES MELLITUS WITH OTHER CIRCULATORY COMPLICATION, WITH LONG-TERM CURRENT USE OF INSULIN (H): ICD-10-CM

## 2023-05-24 DIAGNOSIS — L97.514 ULCER OF RIGHT FOOT WITH NECROSIS OF BONE (H): Primary | ICD-10-CM

## 2023-05-24 DIAGNOSIS — L97.514 ULCER OF RIGHT FOOT, WITH NECROSIS OF BONE (H): ICD-10-CM

## 2023-05-24 DIAGNOSIS — L97.421 ULCER OF LEFT HEEL AND MIDFOOT, LIMITED TO BREAKDOWN OF SKIN (H): ICD-10-CM

## 2023-05-24 LAB
ALBUMIN SERPL BCG-MCNC: 4 G/DL (ref 3.5–5.2)
ALP SERPL-CCNC: 90 U/L (ref 40–129)
ALT SERPL W P-5'-P-CCNC: <5 U/L (ref 10–50)
ANION GAP SERPL CALCULATED.3IONS-SCNC: 15 MMOL/L (ref 7–15)
AST SERPL W P-5'-P-CCNC: 18 U/L (ref 10–50)
BILIRUB SERPL-MCNC: 0.5 MG/DL
BUN SERPL-MCNC: 28.4 MG/DL (ref 8–23)
CALCIUM SERPL-MCNC: 9.7 MG/DL (ref 8.8–10.2)
CHLORIDE SERPL-SCNC: 100 MMOL/L (ref 98–107)
CREAT SERPL-MCNC: 1.27 MG/DL (ref 0.67–1.17)
CRP SERPL-MCNC: 30.7 MG/L
DEPRECATED HCO3 PLAS-SCNC: 22 MMOL/L (ref 22–29)
FASTING STATUS PATIENT QL REPORTED: ABNORMAL
GFR SERPL CREATININE-BSD FRML MDRD: 58 ML/MIN/1.73M2
GLUCOSE SERPL-MCNC: 151 MG/DL (ref 70–99)
GLUCOSE SERPL-MCNC: 151 MG/DL (ref 70–99)
HBA1C MFR BLD: 6.6 % (ref 0–5.6)
MRSA DNA SPEC QL NAA+PROBE: NEGATIVE
POTASSIUM SERPL-SCNC: 5.1 MMOL/L (ref 3.4–5.3)
PROT SERPL-MCNC: 8.4 G/DL (ref 6.4–8.3)
SA TARGET DNA: NEGATIVE
SODIUM SERPL-SCNC: 137 MMOL/L (ref 136–145)

## 2023-05-24 PROCEDURE — 36415 COLL VENOUS BLD VENIPUNCTURE: CPT

## 2023-05-24 PROCEDURE — 11043 DBRDMT MUSC&/FSCA 1ST 20/<: CPT | Performed by: PODIATRIST

## 2023-05-24 PROCEDURE — 87640 STAPH A DNA AMP PROBE: CPT | Mod: 59 | Performed by: INTERNAL MEDICINE

## 2023-05-24 PROCEDURE — 11046 DBRDMT MUSC&/FSCA EA ADDL: CPT | Performed by: PODIATRIST

## 2023-05-24 PROCEDURE — 11044 DBRDMT BONE 1ST 20 SQ CM/<: CPT | Performed by: PODIATRIST

## 2023-05-24 PROCEDURE — 11042 DBRDMT SUBQ TIS 1ST 20SQCM/<: CPT | Performed by: PODIATRIST

## 2023-05-24 PROCEDURE — 99204 OFFICE O/P NEW MOD 45 MIN: CPT | Performed by: INTERNAL MEDICINE

## 2023-05-24 PROCEDURE — 80053 COMPREHEN METABOLIC PANEL: CPT

## 2023-05-24 PROCEDURE — 86140 C-REACTIVE PROTEIN: CPT

## 2023-05-24 PROCEDURE — 11047 DBRDMT BONE EACH ADDL: CPT | Performed by: PODIATRIST

## 2023-05-24 PROCEDURE — 11045 DBRDMT SUBQ TISS EACH ADDL: CPT | Performed by: PODIATRIST

## 2023-05-24 PROCEDURE — 83036 HEMOGLOBIN GLYCOSYLATED A1C: CPT

## 2023-05-24 PROCEDURE — 87641 MR-STAPH DNA AMP PROBE: CPT | Performed by: INTERNAL MEDICINE

## 2023-05-24 RX ORDER — PIPERACILLIN SODIUM, TAZOBACTAM SODIUM 3; .375 G/15ML; G/15ML
3.38 INJECTION, POWDER, LYOPHILIZED, FOR SOLUTION INTRAVENOUS EVERY 8 HOURS
Qty: 1890 ML | Refills: 0
Start: 2023-05-24 | End: 2023-07-07

## 2023-05-24 ASSESSMENT — PAIN SCALES - GENERAL: PAINLEVEL: SEVERE PAIN (7)

## 2023-05-24 NOTE — PROGRESS NOTES
FOOT AND ANKLE SURGERY/PODIATRY Progress Note      ASSESSMENT:   Osteomyelitis calcaneus right   Ulceration right heel  Ulceration left heel      TREATMENT:  -I discussed with the patient that there is significant amount of slough and nonviable tissue along both the dorsal and plantar skin margins of the right foot ulceration with increased depth and exposed bone.  I recommend surgical resection of this nonviable tissue as this cannot be performed in the office setting.  We discussed importance of compliance and use of the wound VAC postoperatively and protein supplements with good nutrition.  Risks include but limited to need for additional foot amputation or loss of limb.  I also discussed BKA at this time for more definitive procedure, patient declines and states he would like to continue to try to salvage the right foot.    -I encouraged him to follow-up with infectious disease later this morning to address osteomyelitis of the right calcaneus.    -After discussion of risk factors, nursing staff removed dressing, cleansed wound and consent obtained 2% Lidocaine HCL jelly was applied, under clean conditions, the right foot ulceration(s) were debrided using currette.  Devitalized and nonviable tissue, along with any fibrin and slough, was removed to improve granulation tissue formation, stimulate wound healing, decrease overall bacteria load, disrupt biofilm formation and decrease edge senescence. Wound drainage was scant No. Total excisional debridement was 96.9 sq cm into the bone with a depth of 1 cm.   Ulcers were improved afterwards and .  Measures were as noted on the flow sheet. A gauze dressing was applied.     -After discussion of risk factors, nursing staff removed dressing, cleansed wound and consent obtained 2% Lidocaine HCL jelly was applied, under clean conditions, the right heel ulceration(s) were debrided using currette.  Devitalized and nonviable tissue, along with any fibrin and slough,  was removed to improve granulation tissue formation, stimulate wound healing, decrease overall bacteria load, disrupt biofilm formation and decrease edge senescence. Wound drainage was scant No. Total excisional debridement was 42.9 sq cm into the muscle/fascia with a depth of 1 cm.   Ulcers were improved afterwards and .  Measures were as noted on the flow sheet. A gauze dressing was applied.      -After discussion of risk factors, nursing staff removed dressing, cleansed wound and consent obtained 2% Lidocaine HCL jelly was applied, under clean conditions, the left heel ulceration(s) were debrided using currette.  Devitalized and nonviable tissue, along with any fibrin and slough, was removed to improve granulation tissue formation, stimulate wound healing, decrease overall bacteria load, disrupt biofilm formation and decrease edge senescence. Wound drainage was scant No. Total excisional debridement was 20 sq cm into the subcutaneous tissue with a depth of 0.2 cm.   Ulcers were improved afterwards and .  Measures were as noted on the flow sheet. A gauze dressing was applied.      -I will asked my office to coordinate surgery at [facility] for next week.    Miguelangel Spears DPM  [facility]      HPI: Ever Crane was seen again today for bilateral foot ulcerations.  He is scheduled with infectious disease later this morning for osteomyelitis of the right calcaneus.  Patient did present to clinic today with a plastic bag wrapped around the right foot and states that the wound VAC fell off overnight last night.    Past Medical History:   Diagnosis Date     A-fib (H)      MESHA (acute kidney injury) (H)      Anemia      Atopic keratoconjunctivitis      Atrial fibrillation (H)     Brian Moe: 8/2011 Cardioversion; CHADS2 VASC = 5; he is on warfarin and sotalol      Atrial flutter (H)      Mcgarry's esophagus 01/01/2012    per note of Dr. Tavo Mike of [facility]     Bilateral  lower leg cellulitis 08/31/2018     BPH (benign prostatic hyperplasia)      Candidiasis of perineum 01/03/2018     Carotid stenosis      Carotid stenosis, asymptomatic, right      CHF (congestive heart failure) (H)      Cholecystitis, acute 08/18/2019     Chronic systolic heart failure (H)      Chronic venous stasis dermatitis      Closed nondisplaced intertrochanteric fracture of left femur with routine healing, subsequent encounter 05/18/2022     Clostridium difficile colitis      COPD (chronic obstructive pulmonary disease) (H)      Coronary artery disease due to lipid rich plaque 2000    CABG x2     Diabetes (H)      Diabetic ulcer of both feet (H) 10/31/2017     Dyslexia      Dyslipidemia, goal LDL below 70 2000     Epistaxis      Essential hypertension      Gangrene of left foot (H)      GERD (gastroesophageal reflux disease)      HLD (hyperlipidemia)      HTN (hypertension)      Hyponatremia      Ischemic heart disease      Lymphedema      Mild cognitive impairment      MRSA (methicillin resistant Staphylococcus aureus)      Neuropathy      Non-STEMI (non-ST elevated myocardial infarction) (H)      Occlusion and stenosis of right carotid artery      Osteoarthrosis      Osteomyelitis of ankle and foot (H)      Other atopic dermatitis      PAD (peripheral artery disease) (H)      Peripheral vascular disease (H)      Pneumonia 06/26/2018     Polyneuropathy due to type 2 diabetes mellitus (H)      Pressure ulcer, heel     Bilateral     Renal insufficiency      Type 2 diabetes mellitus, without long-term current use of insulin (H)      Unable to function independently 11/13/2017       Past Surgical History:   Procedure Laterality Date     AMPUTATE FOOT Left 11/05/2017    Procedure: LEFT TRANSMETATARSAL AMPUTATION;  Surgeon: Ever Wick MD;  Location: [facility] [place];  Service:      AMPUTATE FOOT Right 4/27/2023    Procedure: Transmetatarsal amputation right foot;  Surgeon: Miguelangel Spears DPM;   Location: [facility] [place]     AMPUTATE FOOT Right 5/15/2023    Procedure: partial calcanectomy;  Surgeon: Miguelangel Spears DPM;  Location: [facility] [place]     BYPASS GRAFT ARTERY CORONARY N/A 03/06/2000    SVG to OM1, SVG to PDA     BYPASS GRAFT ARTERY CORONARY N/A 10/04/2018    redo CABG due to graft failure     CABG MEASURES GRP       CARDIOVERSION  08/25/2011     CV CORONARY ANGIOGRAM N/A 10/01/2018    Procedure: Coronary Angiogram;  Surgeon: Miki Mac MD;  Location: [facility] [place] [facility];  Service:      INCISION AND DRAINAGE FOOT, COMBINED Right 5/15/2023    Procedure: INCISION AND DRAINAGE, right heel with,;  Surgeon: Miguelangel Spears DPM;  Location: [facility] [place]     INGUINAL HERNIA REPAIR Bilateral 1969    and 1979     IR EXTREMITY ANGIOGRAM BILATERAL  11/3/2017     IR LOWER EXTREMITY ANGIOGRAM LEFT  3/10/2023     IR LOWER EXTREMITY ANGIOGRAM RIGHT  1/24/2023     LAPAROSCOPIC CHOLECYSTECTOMY N/A 08/18/2019    Procedure: CHOLECYSTECTOMY, LAPAROSCOPIC;  Surgeon: Stewart Fountain MD;  Location: Health system;  Service: General     LENGTHEN TENDON ACHILLES Right 4/27/2023    Procedure: with Achilles tendon lengthening, debridement of right heel ulceration.;  Surgeon: Miguelangel Spears DPM;  Location: [facility] [place]       Allergies   Allergen Reactions     Cranberry Extract Itching and Rash     Doxycycline Rash     Latex Rash     Penicillin V Rash     Reaction occurred as a child. Patient tolerated Zosyn 6/2018, Cefazolin 10/2018, and has also tolerated Augmentin.     Penicillins Rash     Reaction occurred as a child. Patient tolerated Zosyn 6/2018, Cefazolin 10/2018, and has also tolerated Augmentin.         Current Outpatient Medications:      acetaminophen (TYLENOL) 325 MG tablet, 1000 mg tid scheduled and 500 mg bid prn, Disp: , Rfl:      albuterol (PROAIR HFA/PROVENTIL HFA/VENTOLIN HFA) 108 (90 Base) MCG/ACT inhaler, Inhale 2 puffs into the lungs 2 times daily And q4h  PRN, Disp: , Rfl:      cetirizine (ZYRTEC) 10 MG tablet, Take 5 mg by mouth daily, Disp: , Rfl:      clopidogrel (PLAVIX) 75 MG tablet, Take 1 tablet (75 mg) by mouth daily Start taking medication the day after the procedure., Disp: 90 tablet, Rfl: 1     dulaglutide (TRULICITY) 0.75 MG/0.5ML pen, Inject 0.75 mg Subcutaneous every 7 days, Disp: , Rfl:      furosemide (LASIX) 40 MG tablet, Take 40 mg by mouth daily, Disp: , Rfl:      gabapentin (NEURONTIN) 100 MG capsule, Take 200 mg by mouth 2 times daily, Disp: , Rfl:      glipiZIDE (GLUCOTROL) 10 MG tablet, Take 2.5 mg by mouth 2 times daily (before meals), Disp: , Rfl:      ketoconazole (NIZORAL) 2 % external shampoo, Apply topically twice a week Mon and Fri., Disp: , Rfl:      losartan (COZAAR) 25 MG tablet, Take 12.5 mg by mouth daily, Disp: , Rfl:      mineral oil-hydrophilic petrolatum (AQUAPHOR) external ointment, Apply topically 2 times daily, Disp: , Rfl:      omeprazole (PRILOSEC) 20 MG CR capsule, Take 20 mg by mouth daily, Disp: , Rfl:      oxyCODONE (ROXICODONE) 5 MG tablet, Take 1-2 tablets (5-10 mg) by mouth every 6 hours as needed for moderate to severe pain, Disp: 12 tablet, Rfl: 0     polyethylene glycol (MIRALAX) 17 g packet, Take 1 packet by mouth daily, Disp: , Rfl:      rivaroxaban ANTICOAGULANT (XARELTO) 15 MG TABS tablet, Take by mouth daily (with dinner), Disp: , Rfl:      senna-docusate (SENOKOT-S/PERICOLACE) 8.6-50 MG tablet, Take 1 tablet by mouth 2 times daily And BID PRN, Disp: , Rfl:      simvastatin (ZOCOR) 40 MG tablet, Take 40 mg by mouth At Bedtime, Disp: , Rfl:      spironolactone (ALDACTONE) 25 MG tablet, Take 25 mg by mouth daily, Disp: , Rfl:      tamsulosin (FLOMAX) 0.4 MG capsule, Take 0.4 mg by mouth daily, Disp: , Rfl:     Review of Systems - 10 point Review of Systems is negative except for bilateral foot ulcers which is noted in HPI.      OBJECTIVE:  /64   Pulse 62   SpO2 96%   General appearance: Patient is alert  and fully cooperative with history & exam.  No sign of distress is noted during the visit.    Vascular: Dorsalis pedis non-palpableBilateral.  Dermatologic:    VASC Wound Right heel (Active)   Pre Size Length 6.6 05/24/23 0800   Pre Size Width 6.5 05/24/23 0800   Pre Size Depth 1 05/24/23 0800   Pre Total Sq cm 42.9 05/24/23 0800   Post Size Length 9 01/13/23 1300   Post Size Width 9 01/13/23 1300   Post Size Depth 0.1 01/13/23 1300   Post Total Sq cm 81 01/13/23 1300   Description eschar 04/05/23 1000       VASC Wound Left heel (Active)   Pre Size Length 5 05/24/23 0800   Pre Size Width 4 05/24/23 0800   Pre Size Depth 0.2 05/24/23 0800   Pre Total Sq cm 20 05/24/23 0800   Post Size Length 7 01/13/23 1300   Post Size Width 6 01/13/23 1300   Post Size Depth 0.1 01/13/23 1300   Post Total Sq cm 42 01/13/23 1300       VASC Wound Right Distal foot (Active)   Pre Size Length 5.7 05/24/23 0800   Pre Size Width 17 05/24/23 0800   Pre Size Depth 1 05/24/23 0800   Pre Total Sq cm 96.9 05/24/23 0800       Incision/Surgical Site with Packing 05/15/23 Qty Placed: 3 (Active)       Incision/Surgical Site 03/10/23 Anterior;Left Pelvis (Active)       Incision/Surgical Site 04/27/23 Right Foot (Active)   Incision Assessment UTV 05/15/23 1437   Incision Drainage Amount Large 05/15/23 1437   Drainage Description Odor present;Serosanguinous;Purulent 05/15/23 1437   Dressing Intervention Moist drainage;Dried drainage 05/15/23 1437       Incision/Surgical Site 05/15/23 Right Leg (Active)   Slough along dorsal and plantar skin margins of the right foot ulceration which increased depth and exposed bone along the medial margin, central aspect of the ulceration has granular base.  Granular tissue bilateral heel ulcerations with increased depth along the right heel.  Mild erythema anterior right leg.  Neurologic: Diminished to light touch Bilateral.  Musculoskeletal: TMA bilateral.    Imaging:     POC US Guidance Needle Placement    Result  "Date: 5/15/2023  Ultrasound was performed as guidance to an anesthesia procedure.  Click \"PACS images\" hyperlink below to view any stored images.  For specific procedure details, view procedure note authored by anesthesia.    POC US Guidance Needle Placement    Result Date: 5/15/2023  Ultrasound was performed as guidance to an anesthesia procedure.  Click \"PACS images\" hyperlink below to view any stored images.  For specific procedure details, view procedure note authored by anesthesia.    MR Ankle Right w/o & w Contrast    Result Date: 5/11/2023  EXAM: MR ANKLE RIGHT W/O and W CONTRAST LOCATION: [facility] DATE/TIME: 5/11/2023 6:35 PM CDT INDICATION: Osteomyelitis of calcaneus COMPARISON: None. TECHNIQUE: Routine. Additional postgadolinium T1 sequences were obtained. IV CONTRAST: 8 ml Gadavist FINDINGS: TENDONS: -Peroneal: Peroneus longus and brevis tendons are intact. No tendinopathy or tenosynovitis. No subluxation. -Medial: Posterior tibialis tendon is intact. No tendinopathy or tenosynovitis. Flexor digitorum longus and flexor hallucis longus tendons are normal. No tenosynovitis. -Anterior: Anterior tibialis, extensor hallucis longus, and extensor digitorum longus tendons are normal. No tenosynovitis. -Achilles: Mild Achilles tendinopathy without tearing. LIGAMENTS: -Anterior talofibular ligament: Intact. -Calcaneofibular ligament: Intact. -Posterior talofibular ligament: Intact. -Syndesmotic inferior tibiofibular ligaments: Intact. -Deltoid ligament complex: Intact. -Spring ligament complex: Intact. JOINTS AND BONES: -There is a soft tissue ulceration along the inferolateral aspect of the hindfoot which approaches the periosteal margin of the calcaneus where there is bone signal abnormality worrisome for very early developing osteomyelitis confined to the superficial  margin identified on series 5 and 12, images 10 and 9 respectively and also seen on series 8, 9 and 11, image 5. No " "significant bone destruction is identified. No effusion to suggest septic arthropathy. No evidence for fracture. SOFT TISSUES: -Plantar fascia: Intact. No acute fasciitis or tear. -Sinus tarsi and tarsal tunnel: Normal. -Muscles: Normal. -Extensive but nonspecific edema or cellulitis about the lower leg. Previously described ulceration along the inferolateral aspect of the hindfoot. No evidence for foreign body.     IMPRESSION: 1.  Bone signal abnormality and abnormal enhancement worrisome for early developing osteomyelitis confined to the superficial margin of the inferolateral aspect of the calcaneus which is seen at the base of an ulceration. 2.  Surrounding edema or cellulitis about this region, as well as around the lower leg and ankle but no evidence for organized fluid collection to suggest abscess. 3.  No evidence for fracture. 4.  No significant tendinous or ligamentous pathology. There is mild Achilles tendinopathy. 5.  Degenerative change at the midfoot, incompletely visualized at the navicular cuneiform and TMT joints.    POC US Guidance Needle Placement    Result Date: 4/27/2023  Ultrasound was performed as guidance to an anesthesia procedure.  Click \"PACS images\" hyperlink below to view any stored images.  For specific procedure details, view procedure note authored by anesthesia.         Picture:                     "

## 2023-05-24 NOTE — PROGRESS NOTES
Patient to continue on plavix, discussed with Dr. Spears. Xarelto will need to be held starting 3 days before surgery, will send orders to [facility] to address if bridging medication needed.

## 2023-05-24 NOTE — PROGRESS NOTES
[facility] INFECTIOUS DISEASE CLINIC INITIAL CONSULTATION    Date: 5/24/2023  Patient Name: Ever Crane   YOB: 1945  MRN: [id number]      ASSESSMENT:  1. Osteomyelitis R calcaneus. I+D 5/15/23. Cultures with enterobacter, enterococcus, bacteroides, corynebacterium  In need of repeat debridement 6/1/23  2. L heel wound to soft tissues  3. S/p R TMA 4/27/23. Cultures MSSA (not MRSA)  4. DM  5. H/o remote penicillin allergy. Has tolerated pip/tazo in the more recent past  6. H/o MRSA in 2019.     PLAN:  Labs today and weekly  Zosyn 6 weeks  PICC placement at [facility] the [facility]  MRSA screen today negative  Surgery debridement planned 6/1/23.   Return 4 weeks.       Wagner Cr MD  [facility]   Clinic phone: [phone]   Clinic fax: [phone]    ______________________________________________________________________    HISTORY OF PRESENT ILLNESS: Ever Crane is a 78 year old man who is referred for evaluation of osteomyelitis R heel    Last antibiotic clindamycin.     Recalls heel ulcers -- noted this started as pressure ulcers, water blisters, this was debrided a couple of months ago. Then large eschars, this was painful. Here with his sister.     Wound vac was at [facility] site, this came off last night so now has dressing in place.     Diarrhea for a couple of years.     On 5/15/23 he underwent:  Procedure:   1. Incision and drainage right foot foot  2.  Partial calcanectomy right  3.  Excisional Debridement ulceration right foot into muscle  4.  Excisional debridement ulceration right heel into bone    Operative findings: At this time all nonviable tissue was excisionally debrided with a #15 blade removed from the surgical site.  A rongeur and osteotome and mallet were then used to resect bone from the calcaneus which was then sent for aerobic/anaerobic culture.   Culture 5/15 enterobacter, enterococcus faecalis and avium, bacteroides, also corynebacterium  striatum, another GPR.     Bactrim January, march  Clindamycin  Bactrim early May.     MRSA 2019    Earlier 2023 -- MSSA, Group B strep    He lives a [place] on the [employer] at [facility] since January.     R TMA 4/27/23 2017 L TMA    Denies pain, fevers, chills.       Past Medical History:   Diagnosis Date     A-fib (H)      MESHA (acute kidney injury) (H)      Anemia      Atopic keratoconjunctivitis      Atrial fibrillation (H)     Brian Moe: 8/2011 Cardioversion; CHADS2 VASC = 5; he is on warfarin and sotalol      Atrial flutter (H)      Mcgarry's esophagus 01/01/2012    per note of Dr. Tavo Mike of [facility]     Bilateral lower leg cellulitis 08/31/2018     BPH (benign prostatic hyperplasia)      Candidiasis of perineum 01/03/2018     Carotid stenosis      Carotid stenosis, asymptomatic, right      CHF (congestive heart failure) (H)      Cholecystitis, acute 08/18/2019     Chronic systolic heart failure (H)      Chronic venous stasis dermatitis      Closed nondisplaced intertrochanteric fracture of left femur with routine healing, subsequent encounter 05/18/2022     Clostridium difficile colitis      COPD (chronic obstructive pulmonary disease) (H)      Coronary artery disease due to lipid rich plaque 2000    CABG x2     Diabetes (H)      Diabetic ulcer of both feet (H) 10/31/2017     Dyslexia      Dyslipidemia, goal LDL below 70 2000     Epistaxis      Essential hypertension      Gangrene of left foot (H)      GERD (gastroesophageal reflux disease)      HLD (hyperlipidemia)      HTN (hypertension)      Hyponatremia      Ischemic heart disease      Lymphedema      Mild cognitive impairment      MRSA (methicillin resistant Staphylococcus aureus)      Neuropathy      Non-STEMI (non-ST elevated myocardial infarction) (H)      Occlusion and stenosis of right carotid artery      Osteoarthrosis      Osteomyelitis of ankle and foot (H)      Other atopic dermatitis      PAD (peripheral artery disease) (H)      Peripheral  vascular disease (H)      Pneumonia 06/26/2018     Polyneuropathy due to type 2 diabetes mellitus (H)      Pressure ulcer, heel     Bilateral     Renal insufficiency      Type 2 diabetes mellitus, without long-term current use of insulin (H)      Unable to function independently 11/13/2017       Past Surgical History:   Procedure Laterality Date     AMPUTATE FOOT Left 11/05/2017    Procedure: LEFT TRANSMETATARSAL AMPUTATION;  Surgeon: Ever Wick MD;  Location: [facility];  Service:      AMPUTATE FOOT Right 4/27/2023    Procedure: Transmetatarsal amputation right foot;  Surgeon: Miguelangel Spears DPM;  Location: [facility]     AMPUTATE FOOT Right 5/15/2023    Procedure: partial calcanectomy;  Surgeon: Miguelangel Spears DPM;  Location: [facility]     BYPASS GRAFT ARTERY CORONARY N/A 03/06/2000    SVG to OM1, SVG to PDA     BYPASS GRAFT ARTERY CORONARY N/A 10/04/2018    redo CABG due to graft failure     CABG MEASURES GRP       CARDIOVERSION  08/25/2011     CV CORONARY ANGIOGRAM N/A 10/01/2018    Procedure: Coronary Angiogram;  Surgeon: Miki Mac MD;  Location: [facility];  Service:      INCISION AND DRAINAGE FOOT, COMBINED Right 5/15/2023    Procedure: INCISION AND DRAINAGE, right heel with,;  Surgeon: Miguelangle Spears DPM;  Location: [facility]     INGUINAL HERNIA REPAIR Bilateral 1969    and 1979     IR EXTREMITY ANGIOGRAM BILATERAL  11/3/2017     IR LOWER EXTREMITY ANGIOGRAM LEFT  3/10/2023     IR LOWER EXTREMITY ANGIOGRAM RIGHT  1/24/2023     LAPAROSCOPIC CHOLECYSTECTOMY N/A 08/18/2019    Procedure: CHOLECYSTECTOMY, LAPAROSCOPIC;  Surgeon: Stewart Fountain MD;  Location: [facility];  Service: General     LENGTHEN TENDON ACHILLES Right 4/27/2023    Procedure: with Achilles tendon lengthening, debridement of right heel ulceration.;  Surgeon: Miguelangel Spears DPM;  Location: [facility]       Social History     Socioeconomic  "History     Marital status:      Spouse name: Not on file     Number of children: 1     Years of education: Not on file     Highest education level: Not on file   Occupational History     Not on file   Tobacco Use     Smoking status: Former     Packs/day: 1.00     Years: 36.00     Pack years: 36.00     Types: Cigarettes     Start date: 1964     Quit date: 2000     Years since quittin.0     Smokeless tobacco: Never   Vaping Use     Vaping status: Not on file   Substance and Sexual Activity     Alcohol use: Not Currently     Alcohol/week: 5.0 standard drinks of alcohol     Types: 1 Cans of beer, 4 Standard drinks or equivalent per week     Drug use: No     Sexual activity: Not Currently     Partners: Female   Other Topics Concern     Not on file   Social History Narrative    Ever lived with his son Raciel in 2017, but then he went to [facility] after foot amputation.  Ever states he will move to a facility in [place] in . 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  Our reception staff at [facility] in [place] confirmed on 2018 that Ever brought himself to the campus and did not utilize a family member or ride service.  I  would also note that he states his granddaughter is name Claire and not Leonela, so I corrected that in the chart (ROSALIA Moe MD). He lives in [facility] in  and is now not driving as he states \"I got a ticket\".        Social Determinants of Health     Financial Resource Strain: Not on file   Food Insecurity: Not on file   Transportation Needs: Not on file   Physical Activity: Not on file   Stress: Not on file   Social Connections: Not on file   Intimate Partner Violence: Not on file   Housing Stability: Not on file       Immunization History   Administered Date(s) Administered     COVID-19 " MONOVALENT 12+ (Pfizer) 10/01/2022     COVID-19 Monovalent 18+ (Moderna) 01/27/2021, 02/24/2021, 11/23/2021, 05/23/2022     FLUAD(HD)65+ QUAD 10/15/2020, 09/14/2021     Flu 65+ Years 02/22/2020     Flu, Unspecified 10/10/2017, 10/01/2022     Influenza (High Dose) 3 valent vaccine 10/02/2018     Influenza (IIV3) PF 12/15/2005, 10/17/2006, 10/19/2010, 09/06/2011, 08/30/2012, 11/12/2013, 10/21/2014, 09/22/2015     Influenza Vaccine 65+ (Fluzone HD) 09/14/2021, 11/01/2022     Influenza Vaccine, 6+MO IM (QUADRIVALENT W/PRESERVATIVES) 10/17/2006, 10/19/2010, 09/06/2011, 08/30/2012, 10/21/2014, 09/22/2015, 10/06/2016, 10/10/2017     Mantoux Tuberculin Skin Test 05/17/2022, 05/28/2022     Pneumo Conj 13-V (2010&after) 10/21/2014     Pneumococcal 23 valent 10/19/2010, 10/19/2010     TDAP (Adacel,Boostrix) 08/22/2011, 05/12/2022     TDAP Vaccine (Adacel) 08/22/2011     Zoster vaccine, live 08/09/2012, 06/03/2019          ROS: All other systems negative except as listed above.      Current Outpatient Medications:      acetaminophen (TYLENOL) 325 MG tablet, 1000 mg tid scheduled and 500 mg bid prn, Disp: , Rfl:      albuterol (PROAIR HFA/PROVENTIL HFA/VENTOLIN HFA) 108 (90 Base) MCG/ACT inhaler, Inhale 2 puffs into the lungs 2 times daily And q4h PRN, Disp: , Rfl:      cetirizine (ZYRTEC) 10 MG tablet, Take 5 mg by mouth daily, Disp: , Rfl:      clopidogrel (PLAVIX) 75 MG tablet, Take 1 tablet (75 mg) by mouth daily Start taking medication the day after the procedure., Disp: 90 tablet, Rfl: 1     dulaglutide (TRULICITY) 0.75 MG/0.5ML pen, Inject 0.75 mg Subcutaneous every 7 days, Disp: , Rfl:      furosemide (LASIX) 40 MG tablet, Take 40 mg by mouth daily, Disp: , Rfl:      gabapentin (NEURONTIN) 100 MG capsule, Take 200 mg by mouth 2 times daily, Disp: , Rfl:      glipiZIDE (GLUCOTROL) 10 MG tablet, Take 2.5 mg by mouth 2 times daily (before meals), Disp: , Rfl:      ketoconazole (NIZORAL) 2 % external shampoo, Apply topically  twice a week Mon and Fri., Disp: , Rfl:      losartan (COZAAR) 25 MG tablet, Take 12.5 mg by mouth daily, Disp: , Rfl:      mineral oil-hydrophilic petrolatum (AQUAPHOR) external ointment, Apply topically 2 times daily, Disp: , Rfl:      omeprazole (PRILOSEC) 20 MG CR capsule, Take 20 mg by mouth daily, Disp: , Rfl:      oxyCODONE (ROXICODONE) 5 MG tablet, Take 1-2 tablets (5-10 mg) by mouth every 6 hours as needed for moderate to severe pain, Disp: 12 tablet, Rfl: 0     piperacillin-tazobactam (ZOSYN) 3-0.375 GM vial, Inject 3.375 g into the vein every 8 hours for 42 days 4 hour infusion, Disp: 1890 mL, Rfl: 0     polyethylene glycol (MIRALAX) 17 g packet, Take 1 packet by mouth daily, Disp: , Rfl:      rivaroxaban ANTICOAGULANT (XARELTO) 15 MG TABS tablet, Take by mouth daily (with dinner), Disp: , Rfl:      senna-docusate (SENOKOT-S/PERICOLACE) 8.6-50 MG tablet, Take 1 tablet by mouth 2 times daily And BID PRN, Disp: , Rfl:      simvastatin (ZOCOR) 40 MG tablet, Take 40 mg by mouth At Bedtime, Disp: , Rfl:      spironolactone (ALDACTONE) 25 MG tablet, Take 25 mg by mouth daily, Disp: , Rfl:      tamsulosin (FLOMAX) 0.4 MG capsule, Take 0.4 mg by mouth daily, Disp: , Rfl:       OBJECTIVE:  /64 (BP Location: Left arm, Patient Position: Sitting, Cuff Size: Adult Regular)   Pulse 62   SpO2 96%       GEN: No acute distress. In wheelchair  HEENT: conjunctiva clear, pupils react to light, oropharynx clear  RESPIRATORY:  Normal breathing pattern. Clear to auscultation  CARDIOVASCULAR:  Regular rate and rhythm. Normal S1 and S2. No murmur, click, gallop or rub.   ABDOMEN:  Soft, normal bowel sounds, non-tender, no masses, no organomegaly.  MUSCULOSKELETAL: No synovitis.  EXTREMITIES: R LE edema. Feet wrapped. photos reviewed from podiatry visit same day.  SKIN/HAIR/NAILS:  No rashes.   NEURO: no focal findings.           Pertinent labs:  Labs reviewed    MICROBIOLOGY DATA:  See above    RADIOLOGY:  Reviewed R foot  recent MRI

## 2023-05-24 NOTE — PROGRESS NOTES
Surgery Scheduled    Pt given instructions while at clinic.        Surgery/Procedure: INCISION AND DRAINAGE, right foot with debridement of ulceration bilateral heels    CPT: 34029     Equipment: Pulse lavage    Location: [facility]:  [address] [place] [address] (phone: [phone], Fax: [phone])    Date: 6/1/23    Time: 940 AM    Admission Type: Outpatient    Surgeon: Dr. Spears    OR Confirmed & :  Yes with Triny on 5/24/2023    Entered on provider calendar:  Yes    Post Op: 6/8/23    Wound Vac Needed: TBD    Home Care Needed: TBD    Blood Thinners Addressed: Xarelto - message routed to nursing to address.    Stress Test Clearance: NO

## 2023-05-24 NOTE — TELEPHONE ENCOUNTER
Pt is scheduled for I&D with Regan on 6/1.  Needing hold orders for xarelto and plavix.     Gloria Julio LPN on 5/24/2023 at 8:53 AM

## 2023-05-24 NOTE — PATIENT INSTRUCTIONS
Osteomyelitis of right calcaneus. Had history of MRSA in 2019, but not recently. Cultures from toe amputation site in April 2023 with MSSA. 5/15/23 cultures enterbacter, enterococcus, bacteroides.     Plan: zosyn IV for 6 weeks  Surgery planned 6/1/23  Weekly labs ordered, fax results to me at .  PICC placement at TCU    Return in 4 weeks.

## 2023-05-24 NOTE — PATIENT INSTRUCTIONS
HOLD VAC UNTIL SURGERY.  WRAP BOTH FEET WITH DRY GAUZE AND CHANGE DAILY  WEAR YOUR PRAFO BOOTS AT ALL TIMES!          Ever,    Your surgery with [facility] has been scheduled. Please read thoroughly and follow instructions.     Procedure:   I&D of right foot with debridement of bilateral heels  Procedure Date :   6/1/2023  ARRIVAL TIME OF ~7:45AM  *A surgery nurse will call you 1-2 days before surgery to inform you of the time of arrival for surgery.  Surgeon:   Dr. Miguelangel Spears  Admission Type:   Outpatient  Procedure Location:   [facility]:  [address] [place] [id number] (phone: [phone], Fax: [phone])    Post Operative Appointment: SEE APPOINTMENTS       Preparation Instructions to complete:    []  NO PRE OP IS NEEDED    [] Preoperative Medication Instructions  We would like you to stop your anticoagulation medications 3-5 days before surgery HOWEVER contact your prescribing provider for instructions before discontinuing any medications. (Examples: Coumadin, Plavix, Xarelto, Eliquis, Pradaxa, Effient, Brilinta) If you are on Coumadin, we would like the goal INR ? 1.2.  IT IS OK TO STAY ON ASPIRIN PRIOR TO SURGERY.   Hold Ibuprofen, Herbal Supplements and Vitamin E 7 days before  Stop Cialis, Levitra and Viagra 2-3 days before surgery    [] Fasting Requirements  Nothing to eat for 8 hours before surgery unless instructed differently by the surgery nurse.  You may have clear liquids up to two hours before your arrival time (coffee, tea, water, or Gatorade. No cream or milk)  If your primary care provider has instructed you to continue oral medications, you may take them on the morning of surgery with a small sip of water.    No alcohol or smoking after 12:00am the day of surgery    []  COVID-19 test is no longer needed  If you are experiencing COVID symptoms or have tested positive for COVID-19 within 14 days of procedure date, reach out to the  care team to reschedule please.     [] Contact your insurance regarding coverage  If you would like a Good Ailin Estimate for your upcoming procedure at [facility], contact Cost of Care Estimates   Advocates are available Monday through Friday 8am - 5pm   [phone]  You may also submit a request online through your [employer] account.  For all self-pay, estimate, or anesthesia billing questions at [facility], the contact information is below:  Who to contact: Leslee SOTO  [facility] number: [phone]  Prepay number: [phone]    [] DO NOT BRING FMLA WITH TO SURGERY.  These should be sent to the provider's office by fax to [phone].     [] Day of Surgery  Medications - Take as indicated with sips of water.   Wear comfortable loose fitting clothes. Wear your glasses-Not contacts. Do not wear jewelry and remove body piercing's. Surgery may be cancelled if they are not removed.   You may have 1 family member wait in the lobby during your surgery. Visitor restrictions are subject to change. Please verify with the surgery nurse when they call.   If same day surgery-Have a someone come with you to surgery that can help you understand the surgeon's instructions, drive you home and stay with you overnight the first night.    [] If the community sees a new COVID-19 surge, your procedure may need to be postponed. We will contact you if this happens.    [] You will receive a call from a surgery nurse 1-3 days prior to surgery. They will go over more details with you.             ** AFTER SURGERY INSTRUCTIONS **      [] You are to remain NON WEIGHT BEARING on your right foot NON WEIGHT BEARING MEANS NO PRESSURE ON YOUR FOOT OR HEEL AT ANY TIME FOR ANY REASON!    [] Prior to surgery you should already have a PRAFO BOOT which you will need to WEAR THIS AT ALL TIMES EVEN WHILE IN BED Please bring this with you on the day of surgery.    [] You should already have a A  WHEELCHAIR to help you ambulate after surgery. Please also bring this with you on the day of surgery.    [] During surgery Dr. Spears will apply a gauze dressing to your foot. This will remain intact until the wound vac is applied. The wound vac should be applied within 24 hours after surgery.    [] It is NOT OKAY to get your surgical site wet while bathing, you will need to purchase a cast cover to protect your foot from getting wet. You can purchase this at [facility] or your local pharmacy.    [] It is important that you elevate your affected foot after surgery. Proper elevation is raising your foot above your waist. The fluid in your lower extremities needs to get back to your heart so it can get pumped to your kidneys and expelled through urination. So if you notice you have to go to the bathroom more frequently when you are elevating your leg this is a good sign that it is working.     [] It is important that you increase your protein intake after surgery. Protein is essential for wound healing. We recommend you take a protein supplement twice per day. This is in addition to your normal diet. Examples of protein supplements are Ensure, Boost, Glucerna (if you are diabetic), or protein powder. You can purchase these at your local retailer or grocery store.      Post surgery vac instructions:    - Wound Vac Instructions  Right distal foot    1. 3x weekly and as needed cleanse the area with NS    2. Pat dry    3. Apply Cavilon no sting barrier wipe to the skin surrounding the wound to protect from drainage/maceration    4. Apply drape around incision. Cut strip of drape and apply to skin if bridging is needed; plan this area in advance; should not be over bony prominence     5. Cut the foam to fit the area of the incision    6. Cut narrow strip of foam if bridging    7. Cover foam with drape to obtain air tight seal    8. Cut opening the size of a quarter for where the suction pad will be applied    9.  Apply Suction pad    10. 125mmHG suction continuous    [employer] can be reached at [phone], 24 hours a day 7 days a week     - If you do not have a back up plan in place:     If the negative pressure wound therapy malfunctions or unable to maintain seal: dressing must be removed and reapplied within 2 hours of the incident. If unable to reapply negative pressure wound dressing, place Normal Saline moistened gauze in wound bed and cover with appropriate dressing to keep wound bed moist.  Change wet-to-dry dressing two times a day until healthcare staff can re-implement negative pressure therapy. Change canister at least weekly.  [employer] can be reached at [phone], 24 hours a day 7 days a week    Information on Vacuum-Assisted Closure of a Wound  Vacuum-assisted closure (VAC) of a wound is a type of treatment to help wounds heal. It s also known as negative pressure wound therapy. During the treatment, a device lowers air pressure on the wound. This can help the wound heal more quickly.  Understanding the wound VAC system  A wound VAC system has several parts. A foam or gauze dressing is put directly on the wound. The dressing is changed every 24 to 72 hours. An adhesive film covers and seals the dressing and wound. A drainage tube leads from under the adhesive film and connects to a portable vacuum pump. This pump removes air pressure over the wound. It may do this constantly. Or it may do it in cycles. During the treatment, you ll need to carry the portable pump everywhere you go.  Why wound VAC is used  You might need this therapy for a recent traumatic wound. Or you may need it for a chronic wound. This is a wound that does not heal the way it should over time. This can happen with wounds in people who have diabetes. You may need a wound VAC if you ve had a recent skin graft. And you may need a wound VAC for a large wound. Large wounds can take a longer time to heal.  A wound  vacuum system may help your wound heal more quickly by:  Draining extra fluid from the wound  Reducing swelling  Reducing bacteria in the wound  Keeping your wound moist and warm  Helping draw together wound edges  Increasing blood flow to your wound  Decreasing inflammation  Wound VAC offers some other advantages over other types of wound care. It may decrease your overall discomfort. The dressings usually need to be changed less often. And they may be easier to keep in position.  Risks of wound VAC  Wound VAC has some rare risks, such as:  Bleeding (which may be severe)  Wound infection  An abnormal connection between the intestinal tract and the skin (enteric fistula)  Proper training in dressing changes can help reduce the risk for these complications. Also, your doctor will carefully evaluate you to make sure you are a good candidate for the therapy. Certain problems can increase your risk for complications. These include:  Exposed organs or blood vessels  High risk of bleeding from another medical problem  Wound infection  Nearby bone infection  Dead wound tissue  Cancer tissue  Fragile skin, such as from aging or longtime use of topical steroids  Allergy to adhesive  Very poor blood flow to your wound  Wounds close to joints that may reopen because of movement  Your doctor will discuss the risks that apply to you. Make sure to talk with him or her about all of your questions and concerns.  Getting ready for wound VAC  You likely won t need to do much to get ready for wound VAC. In some cases, you may need to wait a while before having this therapy. For example, your doctor may first need to treat an infection in your wound. Dead or damaged tissue may also need to be removed from your wound.  You or a caregiver may need training on how to use the wound VAC device. This is done if you will be able to have your wound vacuum therapy at home. In other cases, you may need to have your wound vacuum therapy in a  health care facility.  On the day of your procedure  A health care provider will cover your wound with foam or gauze wound dressing. An adhesive film will be put over the dressing and wound. This seals the wound. The foam connects to a drainage tube, which leads to a vacuum pump. This pump is portable. When the pump is turned on, it draws fluid through the foam and out the drainage tubing. The pump may run constantly, or it may cycle off and on. Your exact setup will depend on the specific type of wound vacuum system that you use.  Managing your wound  You may need the dressing changed about once a day. You may need it changed more or less often, depending on your wound. You or your caregiver may be trained to do this at home. Or it may be done by a visiting health care provider. Your doctor may prescribe a pain medicine. This is to prevent or reduce pain during the dressing change.  You will likely need to use the wound VAC system for several weeks or months. During this time, you ll carry the portable pump everywhere you go.  Nutrition for wound healing  During this time, make sure you follow a healthy diet. This is needed so the wound can heal and to prevent infection. Your doctor can tell you more about what to include in your diet during this time.  follow up with your doctor if you have a medical condition that led to your wound, such as diabetes. He or she can help you prevent future wounds.  Follow-up care  Your doctor will carefully keep track of your healing. Make sure to keep all follow-up appointments.  When to call your health care provider  Call your health care provider right away if you have any of these:  Fever of 100.4 F (38.0 C) or higher  Increased redness, swelling, or warmth around wound  Increased pain  Bright red blood or blood clots in tubing or the collection chamber of the vacuum          Notify our office right away, if you have any changes in your health status, or if you develop a cold,  flu, diarrhea, infection, fever or sore throat before your scheduled surgery date. We can be reached at [phone]   Monday-Friday 8 am-4:30 pm if you have any questions.     Thank you for trusting us with your care!

## 2023-05-25 ENCOUNTER — LAB REQUISITION (OUTPATIENT)
Dept: LAB | Facility: CLINIC | Age: 78
End: 2023-05-25
Payer: MEDICARE

## 2023-05-25 ENCOUNTER — TRANSITIONAL CARE UNIT VISIT (OUTPATIENT)
Dept: GERIATRICS | Facility: CLINIC | Age: 78
End: 2023-05-25
Payer: MEDICARE

## 2023-05-25 VITALS
WEIGHT: 179.1 LBS | HEIGHT: 70 IN | RESPIRATION RATE: 16 BRPM | OXYGEN SATURATION: 99 % | SYSTOLIC BLOOD PRESSURE: 128 MMHG | HEART RATE: 65 BPM | TEMPERATURE: 98.1 F | BODY MASS INDEX: 25.64 KG/M2 | DIASTOLIC BLOOD PRESSURE: 52 MMHG

## 2023-05-25 DIAGNOSIS — Z46.89 ENCOUNTER FOR MANAGEMENT OF WOUND VAC: ICD-10-CM

## 2023-05-25 DIAGNOSIS — L97.421 NON-PRESSURE CHRONIC ULCER OF LEFT HEEL AND MIDFOOT LIMITED TO BREAKDOWN OF SKIN (H): ICD-10-CM

## 2023-05-25 DIAGNOSIS — Z98.890 S/P FOOT SURGERY, RIGHT: Primary | ICD-10-CM

## 2023-05-25 DIAGNOSIS — I73.9 PAD (PERIPHERAL ARTERY DISEASE) (H): ICD-10-CM

## 2023-05-25 DIAGNOSIS — E11.59 TYPE 2 DIABETES MELLITUS WITH OTHER CIRCULATORY COMPLICATION, WITH LONG-TERM CURRENT USE OF INSULIN (H): ICD-10-CM

## 2023-05-25 DIAGNOSIS — Z79.4 TYPE 2 DIABETES MELLITUS WITH OTHER CIRCULATORY COMPLICATION, WITH LONG-TERM CURRENT USE OF INSULIN (H): ICD-10-CM

## 2023-05-25 DIAGNOSIS — N18.31 STAGE 3A CHRONIC KIDNEY DISEASE (H): ICD-10-CM

## 2023-05-25 DIAGNOSIS — M86.9 OSTEOMYELITIS OF RIGHT FOOT, UNSPECIFIED TYPE (H): ICD-10-CM

## 2023-05-25 DIAGNOSIS — Z01.818 PREOPERATIVE EXAMINATION: ICD-10-CM

## 2023-05-25 DIAGNOSIS — E11.42 POLYNEUROPATHY DUE TO TYPE 2 DIABETES MELLITUS (H): ICD-10-CM

## 2023-05-25 PROCEDURE — 99310 SBSQ NF CARE HIGH MDM 45: CPT | Performed by: NURSE PRACTITIONER

## 2023-05-25 NOTE — PROGRESS NOTES
[facility]  [address] [place] [address]  Phone: [phone]  Fax: [phone]  Primary Provider: Adriano Dodd Physician  Pre-op Performing Provider: ELVER SEVILLA    PREOPERATIVE EVALUATION:  Today's date: 5/25/2023  Ever Crane is a 78 year old male who presents for a preoperative evaluation.  Surgical Information:  Surgery/Procedure: I&D of right foot w/ debridement of ulceration of bilateral heels.   Surgery Location: [facility] OR  Surgeon: Miguelangel Spears.   Surgery Date: 6/1/23.  Where patient plans to recover: At a TCU (Transitional Care Unit)    Subjective:  HPI related to upcoming procedure:   Ever continues to struggle with osteomyelitis of the right foot and has undergone several procedures with in the past few months surrounding this issue.  Once again, podiatry will take him under for further tissue removal and he is starting IV antibiotics per infectious disease.  PICC line was placed today.  Ever otherwise feels fine, stating that his pain is well controlled, he denies any headaches, dizziness, new shortness of breath.  He has a chronic cough, denies any fever or chills.  He says his appetite is very good, and he denies any nausea, heartburn, constipation, diarrhea.    1. Yes - Have you ever had a heart attack or stroke?   2. Yes - Have you ever had surgery on your heart or blood vessels, such as a stent, coronary (heart) bypass, or surgery on an artery in the head, neck, heart, or legs?   3. No - Do you have chest pain when you are physically active?  4. No - Do you have a history of heart failure?  5. No - Do you currently have a cold, bronchitis, or symptoms of other respiratory (head and chest) infections?  6. Yes - Do you have a cough, shortness of breath, or wheezing?  Chronic cough.  7. No - Do you or anyone in your family have a history of blood clots?  8. No - Do you or anyone in your family have a serious bleeding problem, such as  long-lasting bleeding after surgeries or cuts?  9. Yes - Have you ever had anemia or been told to take iron pills?   10. No - Have you had any abnormal blood loss such as black, tarry or bloody stools, or abnormal vaginal bleeding?  11. No - Have you ever had a blood transfusion?  12. Yes - Are you willing to have a blood transfusion if it is medically needed before, during, or after your surgery?  13. No - Have you or anyone in your family ever had problems with anesthesia (sedation for surgery)?  14. No - Do you have sleep apnea, excessive snoring, or daytime drowsiness?   15. No - Do you have any artifical heart valves or other implanted medical devices, such as a pacemaker, defibrillator, or continuous glucose monitor?  16. Yes - Do you have any artifical joints?  Hip.  17. Yes - Are you allergic to latex?   18. No - Is there any chance that you may be pregnant?    Health Care Directive: Patient has a Health Care Directive on file.  Review of Systems: 10 point ROS of systems including Constitutional, Eyes, Respiratory, Cardiovascular, Gastroenterology, Genitourinary, Integumentary, Muscularskeletal, Psychiatric were all negative except for pertinent positives noted in my HPI.  Patient Active Problem List    Diagnosis Date Noted     Ulcer of right foot, with necrosis of bone (H) 05/24/2023     Priority: Medium     Chronic systolic heart failure (H) 01/17/2023     Priority: Medium     Pressure ulcer of right heel, stage 3 (H) 01/13/2023     Priority: Medium     Pressure ulcer of left heel, stage 3 (H) 01/13/2023     Priority: Medium     Osteomyelitis of ankle and foot (H) 01/13/2023     Priority: Medium     PAD (peripheral artery disease) (H) 01/05/2023     Priority: Medium     Cellulitis and abscess of foot excluding toe 01/05/2023     Priority: Medium     Diabetic ulcer of toe of right foot associated with diabetes mellitus due to underlying condition, with necrosis of bone (H) 01/05/2023     Priority: Medium      Diabetic ulcer of toe of right foot associated with diabetes mellitus due to underlying condition, limited to breakdown of skin (H) 01/05/2023     Priority: Medium     Open wound of both legs with complication 12/28/2022     Priority: Medium     BPH (benign prostatic hyperplasia) 12/06/2022     Priority: Medium     Diarrhea 12/06/2022     Priority: Medium     DM type 2 with diabetic mixed hyperlipidemia (H) 12/06/2022     Priority: Medium     Rectal discharge 10/10/2022     Priority: Medium     Long term current use of anticoagulant therapy 06/07/2022     Priority: Medium     Hyperkalemia 06/07/2022     Priority: Medium     Acute diastolic heart failure (H) 06/07/2022     Priority: Medium     Polyneuropathy due to type 2 diabetes mellitus (H) 06/07/2022     Priority: Medium     Status post amputation of foot (H) 06/07/2022     Priority: Medium     Type 2 diabetes mellitus with other circulatory complication, with long-term current use of insulin (H) 05/25/2022     Priority: Medium     Closed nondisplaced intertrochanteric fracture of left femur with routine healing, subsequent encounter 05/18/2022     Priority: Medium     Coronary atherosclerosis 05/17/2022     Priority: Medium     Hyperlipidemia 05/17/2022     Priority: Medium     Formatting of this note might be different from the original.  Created by Conversion       Typical atrial flutter (H) 05/17/2022     Priority: Medium     Formatting of this note might be different from the original.  Created by Conversion       Acute posthemorrhagic anemia 05/17/2022     Priority: Medium     Chronic obstructive pulmonary disease, unspecified (H) 05/17/2022     Priority: Medium     Personal history of other diseases of the circulatory system 05/17/2022     Priority: Medium     Occlusion and stenosis of right carotid artery 05/17/2022     Priority: Medium     Lymphedema, not elsewhere classified 05/17/2022     Priority: Medium     Venous insufficiency (chronic)  (peripheral) 05/17/2022     Priority: Medium     Essential hypertension 05/12/2022     Priority: Medium     Created by Conversion  Replacement Utility updated for latest IMO load         Ischemic cardiomyopathy 05/12/2022     Priority: Medium     Carotid stenosis, asymptomatic, right 05/12/2022     Priority: Medium     Chronic venous stasis dermatitis 05/12/2022     Priority: Medium     Persistent atrial fibrillation (H) 05/12/2022     Priority: Medium     Formatting of this note might be different from the original.  Was on sotalol prior to CABG but not since. Even metoprolol was stopped and he was in sinus rhythm on Holter in Dec 2018.    Formatting of this note might be different from the original.  Was on sotalol prior to CABG but not since. Even metoprolol was stopped and he was in sinus rhythm on Holter in Dec 2018.       Osteoarthrosis 05/09/2022     Priority: Medium     Neuropathy due to medical condition (H) 05/09/2022     Priority: Medium     Skin tear of lower leg without complication 05/09/2022     Priority: Medium     Foot amputee (H) 04/11/2022     Priority: Medium     Coronary artery disease of autologous bypass graft with stable angina pectoris (H) 03/14/2022     Priority: Medium     Left groin pain 02/28/2022     Priority: Medium     Contact with and (suspected) exposure to covid-19 12/13/2021     Priority: Medium     Constipation, unspecified 10/18/2021     Priority: Medium     Hair loss 10/18/2021     Priority: Medium     Long term (current) use of oral hypoglycemic drugs 05/03/2021     Priority: Medium     Stage 3b chronic kidney disease (H) 11/12/2020     Priority: Medium     Cholecystitis 10/14/2019     Priority: Medium     Clostridium difficile colitis 10/14/2019     Priority: Medium     Mild cognitive impairment 10/14/2019     Priority: Medium     Unspecified open wound, left lower leg, initial encounter 10/14/2019     Priority: Medium     Presence of aortocoronary bypass graft 10/14/2019      Priority: Medium     Fever 06/28/2018     Priority: Medium     Pneumonia 06/26/2018     Priority: Medium     Medically noncompliant 06/20/2018     Priority: Medium     Heart failure with preserved ejection fraction, NYHA class II (H) 01/25/2018     Priority: Medium     Peripheral vascular disease (H) 11/14/2017     Priority: Medium     Acquired lymphedema of lower extremity 10/16/2017     Priority: Medium     Polyneuropathy associated with underlying disease (H) 08/10/2017     Priority: Medium     Venous hypertension, chronic, bilateral 08/10/2017     Priority: Medium     Mcgarry's esophagus 01/01/2012     Priority: Medium     Formatting of this note might be different from the original.  per note of Dr. Tavo Mike of [facility]        Past Medical History:   Diagnosis Date     A-fib (H)      MESHA (acute kidney injury) (H)      Anemia      Atopic keratoconjunctivitis      Atrial fibrillation (H)     [place]oe[place]unen: 8/2011 Cardioversion; CHADS2 VASC = 5; he is on warfarin and sotalol      Atrial flutter (H)      Mcgarry's esophagus 01/01/2012    per note of Dr. Tavo Mike of [facility]     Bilateral lower leg cellulitis 08/31/2018     BPH (benign prostatic hyperplasia)      Candidiasis of perineum 01/03/2018     Carotid stenosis      Carotid stenosis, asymptomatic, right      CHF (congestive heart failure) (H)      Cholecystitis, acute 08/18/2019     Chronic systolic heart failure (H)      Chronic venous stasis dermatitis      Closed nondisplaced intertrochanteric fracture of left femur with routine healing, subsequent encounter 05/18/2022     Clostridium difficile colitis      COPD (chronic obstructive pulmonary disease) (H)      Coronary artery disease due to lipid rich plaque 2000    CABG x2     Diabetes (H)      Diabetic ulcer of both feet (H) 10/31/2017     Dyslexia      Dyslipidemia, goal LDL below 70 2000     Epistaxis      Essential hypertension      Gangrene of left foot (H)      GERD (gastroesophageal reflux disease)       HLD (hyperlipidemia)      HTN (hypertension)      Hyponatremia      Ischemic heart disease      Lymphedema      Mild cognitive impairment      MRSA (methicillin resistant Staphylococcus aureus)      Neuropathy      Non-STEMI (non-ST elevated myocardial infarction) (H)      Occlusion and stenosis of right carotid artery      Osteoarthrosis      Osteomyelitis of ankle and foot (H)      Other atopic dermatitis      PAD (peripheral artery disease) (H)      Peripheral vascular disease (H)      Pneumonia 06/26/2018     Polyneuropathy due to type 2 diabetes mellitus (H)      Pressure ulcer, heel     Bilateral     Renal insufficiency      Type 2 diabetes mellitus, without long-term current use of insulin (H)      Unable to function independently 11/13/2017     Past Surgical History:   Procedure Laterality Date     AMPUTATE FOOT Left 11/05/2017    Procedure: LEFT TRANSMETATARSAL AMPUTATION;  Surgeon: Ever Wick MD;  Location: [facility];  Service:      AMPUTATE FOOT Right 4/27/2023    Procedure: Transmetatarsal amputation right foot;  Surgeon: Miguelangel Spears DPM;  Location: [facility] [place]     AMPUTATE FOOT Right 5/15/2023    Procedure: partial calcanectomy;  Surgeon: Miguelangel Spears DPM;  [place] [facility] [place]     BYPASS GRAFT ARTERY CORONARY N/A 03/06/2000    SVG to OM1, SVG to PDA     BYPASS GRAFT ARTERY CORONARY N/A 10/04/2018    redo CABG due to graft failure     CABG MEASURES GRP       CARDIOVERSION  08/25/2011     CV CORONARY ANGIOGRAM N/A 10/01/2018    Procedure: Coronary Angiogram;  Surgeon: Miki Mac MD;  Location: [facility];  Service:      INCISION AND DRAINAGE FOOT, COMBINED Right 5/15/2023    Procedure: INCISION AND DRAINAGE, right heel with,;  Surgeon: Miguelangel Spears DPM;  [place] [facility] [place]     INGUINAL HERNIA REPAIR Bilateral 1969    and 1979     IR EXTREMITY ANGIOGRAM BILATERAL  11/3/2017     IR LOWER EXTREMITY ANGIOGRAM LEFT  3/10/2023      IR LOWER EXTREMITY ANGIOGRAM RIGHT  1/24/2023     LAPAROSCOPIC CHOLECYSTECTOMY N/A 08/18/2019    Procedure: CHOLECYSTECTOMY, LAPAROSCOPIC;  Surgeon: Stewart Fountain MD;  Location: [facility];  Service: General     LENGTHEN TENDON ACHILLES Right 4/27/2023    Procedure: with Achilles tendon lengthening, debridement of right heel ulceration.;  Surgeon: Miguelangel Spears DPM;  Location: [facility]     Current Outpatient Medications   Medication Sig Dispense Refill     acetaminophen (TYLENOL) 325 MG tablet 1000 mg tid scheduled and 500 mg bid prn       albuterol (PROAIR HFA/PROVENTIL HFA/VENTOLIN HFA) 108 (90 Base) MCG/ACT inhaler Inhale 2 puffs into the lungs 2 times daily And q4h PRN       cetirizine (ZYRTEC) 10 MG tablet Take 5 mg by mouth daily       clopidogrel (PLAVIX) 75 MG tablet Take 1 tablet (75 mg) by mouth daily Start taking medication the day after the procedure. 90 tablet 1     dulaglutide (TRULICITY) 0.75 MG/0.5ML pen Inject 0.75 mg Subcutaneous every 7 days       furosemide (LASIX) 40 MG tablet Take 40 mg by mouth daily       gabapentin (NEURONTIN) 100 MG capsule Take 200 mg by mouth 2 times daily       glipiZIDE (GLUCOTROL) 10 MG tablet Take 2.5 mg by mouth 2 times daily (before meals)       ketoconazole (NIZORAL) 2 % external shampoo Apply topically twice a week Mon and Fri.       losartan (COZAAR) 25 MG tablet Take 12.5 mg by mouth daily       mineral oil-hydrophilic petrolatum (AQUAPHOR) external ointment Apply topically 2 times daily       omeprazole (PRILOSEC) 20 MG CR capsule Take 20 mg by mouth daily       oxyCODONE (ROXICODONE) 5 MG tablet Take 1-2 tablets (5-10 mg) by mouth every 6 hours as needed for moderate to severe pain 12 tablet 0     piperacillin-tazobactam (ZOSYN) 3-0.375 GM vial Inject 3.375 g into the vein every 8 hours for 42 days 4 hour infusion 1890 mL 0     polyethylene glycol (MIRALAX) 17 g packet Take 1 packet by mouth daily       rivaroxaban  "ANTICOAGULANT (XARELTO) 15 MG TABS tablet Take by mouth daily (with dinner)       senna-docusate (SENOKOT-S/PERICOLACE) 8.6-50 MG tablet Take 1 tablet by mouth 2 times daily And BID PRN       simvastatin (ZOCOR) 40 MG tablet Take 40 mg by mouth At Bedtime       spironolactone (ALDACTONE) 25 MG tablet Take 25 mg by mouth daily       tamsulosin (FLOMAX) 0.4 MG capsule Take 0.4 mg by mouth daily       Allergies   Allergen Reactions     Cranberry Extract Itching and Rash     Doxycycline Rash     Latex Rash     Penicillin V Rash     Reaction occurred as a child. Patient tolerated Zosyn 2018, Cefazolin 10/2018, and has also tolerated Augmentin.     Penicillins Rash     Reaction occurred as a child. Patient tolerated Zosyn 2018, Cefazolin 10/2018, and has also tolerated Augmentin.      Social History     Tobacco Use     Smoking status: Former     Packs/day: 1.00     Years: 36.00     Pack years: 36.00     Types: Cigarettes     Start date: 1964     Quit date: 2000     Years since quittin.0     Smokeless tobacco: Never   Vaping Use     Vaping status: Not on file   Substance Use Topics     Alcohol use: Not Currently     Alcohol/week: 5.0 standard drinks of alcohol     Types: 1 Cans of beer, 4 Standard drinks or equivalent per week     History   Drug Use No     Objective:  /52   Pulse 65   Temp 98.1  F (36.7  C)   Resp 16   Ht 1.778 m (5' 10\")   Wt 81.2 kg (179 lb 1.6 oz)   SpO2 99%   BMI 25.70 kg/m    Physical Exam  GENERAL APPEARANCE: Alert, in no distress, cooperative.   ENT: Mouth/posterior oropharynx intact w/ moist mucous membranes, hearing acuity [place].  EYES: EOM, conjunctivae, lids, pupils and irises normal, PERRL2.   RESP: Respiratory effort good, no respiratory distress, Lung sounds clear/diminished. On RA.   CV: Auscultation of heart reveals S1, S2, rate controlled and rhythm irregular, no murmur, no rub or gallop, Edema 1+ BLE. Peripheral pulses are 2+.  ABDOMEN: Normal bowel sounds, " soft, non-tender abdomen, and no masses palpated.  SKIN: Inspection/Palpation of skin and subcutaneous tissue baseline w/ fragility. No wounds/rashes noted, except wounds on BLE which are covered at this time.   NEURO: CN II-XII at patient's baseline, sensation baseline PPS.  PSYCH: Insight, judgement, and memory are baseline impaired, affect and mood are happy/engaged.    Recent Labs   Lab Test 05/24/23  1250 05/15/23  0700 05/03/23  0900 04/12/23  0750 03/10/23  0853 01/24/23  0719   HGB  --   --  9.6* 11.7*   < > 10.8*   PLT  --   --  292 296   < > 174   INR  --   --   --   --   --  1.23*     --  139 140   < > 139   POTASSIUM 5.1  --  4.6 4.8   < > 5.5*   CR 1.27*  --  1.59* 1.28*   < > 1.93*   A1C 6.6* 6.7*  --   --   --   --     < > = values in this interval not displayed.      Diagnostics: Labs pending at this time.  Results will be reviewed when available. EKG required for peripheral arterial disease and not completed in the last 90 days.     Revised Cardiac Risk Index (RCRI): The patient has the following serious cardiovascular risks for perioperative complications:    Coronary Artery Disease (MI, positive stress test, angina, Qs on EKG) = 1 point.    Diabetes Mellitus (on Insulin) = 1 point     RCRI Interpretation: 2 points: Class III (moderate risk - 6.6% complication rate).    Estimated Functional Capacity: CANNOT perform 4 METS without symptoms.    Assessment & Plan   The proposed surgical procedure is considered INTERMEDIATE risk.    Preoperative Examination  S/P foot surgery, right  Osteomyelitis of right foot, unspecified type (H)  Encounter for management of wound VAC  Type 2 diabetes mellitus with other circulatory complication, with long-term current use of insulin (H)  Polyneuropathy due to type 2 diabetes mellitus (H)  PAD (peripheral artery disease) (H)  Stage 3a chronic kidney disease (H)  Acute on chronic.  Tenuous.    Preop system guidelines followed, in conjunction with surgeon  recommendations.    Though a previous EKG was found during his last preoperative period, this was not easily found in chart review today.  Will obtain a subsequent EKG.    Recent blood work done, but will  outside of the 30-day window, will need a CBC as noted below.    Surgery has given instruction on Xarelto and Plavix already, but typically they also give instruction on preoperative hygiene.  Defer to surgery on this.    Will place patient NPO after midnight.    Risk index discussed with patient, and patient does not want to undergo any other invasive testing or delay surgery.    Risks and Recommendations: The patient has the following additional risks and recommendations for perioperative complications:    Diabetes: Patient is not on insulin therapy: regular NPO guidelines can be followed.     Pulmonary: Incentive spirometry post-op    Additional Medication Instructions: Patient is to take all scheduled medications on the day of surgery EXCEPT for modifications listed below.    RECOMMENDATION:  APPROVAL GIVEN to proceed with proposed procedure pending review of diagnostic evaluation.    Orders:  1. NPO after midnight on  @ 0001.  2. Hygiene per surgeon.  3. 12-lead EKG x1, routine.   4. CBC x1 on .  5. Xarelto and Plavix per surgeon. Bridging not generally recommended w/ DOACs.   6. On  - DAY OF SURGERY, HOLD:    Trulicity.    Triamcinolone.    Lasix.    Zyrtec.    Glipizide.    Ketoconazole.    Protein supplement.    Senna S.    Spironolactone.  Dx: PreOp.     Signed Electronically by:   Dr. Yanna Dozier, APRN CNP DNP

## 2023-05-25 NOTE — LETTER
5/25/2023        RE: Ever Crane  [address]  [address]  [facility] [address]        [facility]  [address] [place] [address]  Phone: [phone]  Fax: [phone]  Primary Provider: Services, [facility]  Pre-op Performing Provider: ELVER SEVILLA    PREOPERATIVE EVALUATION:  Today's date: 5/25/2023  Ever Crane is a 78 year old male who presents for a preoperative evaluation.  Surgical Information:  Surgery/Procedure: I&D of right foot w/ debridement of ulceration of bilateral heels.   Surgery Location: [facility] OR  Surgeon: Miguelangel Spears.   Surgery Date: 6/1/23.  Where patient plans to recover: At a TCU (Transitional Care Unit)    Subjective:  HPI related to upcoming procedure:   Ever continues to struggle with osteomyelitis of the right foot and has undergone several procedures with in the past few months surrounding this issue.  Once again, podiatry will take him under for further tissue removal and he is starting IV antibiotics per infectious disease.  PICC line was placed today.  Ever otherwise feels fine, stating that his pain is well controlled, he denies any headaches, dizziness, new shortness of breath.  He has a chronic cough, denies any fever or chills.  He says his appetite is very good, and he denies any nausea, heartburn, constipation, diarrhea.    1. Yes - Have you ever had a heart attack or stroke?   2. Yes - Have you ever had surgery on your heart or blood vessels, such as a stent, coronary (heart) bypass, or surgery on an artery in the head, neck, heart, or legs?   3. No - Do you have chest pain when you are physically active?  4. No - Do you have a history of heart failure?  5. No - Do you currently have a cold, bronchitis, or symptoms of other respiratory (head and chest) infections?  6. Yes - Do you have a cough, shortness of breath, or wheezing?  Chronic cough.  7. No - Do you or anyone in your family have a history of blood  clots?  8. No - Do you or anyone in your family have a serious bleeding problem, such as long-lasting bleeding after surgeries or cuts?  9. Yes - Have you ever had anemia or been told to take iron pills?   10. No - Have you had any abnormal blood loss such as black, tarry or bloody stools, or abnormal vaginal bleeding?  11. No - Have you ever had a blood transfusion?  12. Yes - Are you willing to have a blood transfusion if it is medically needed before, during, or after your surgery?  13. No - Have you or anyone in your family ever had problems with anesthesia (sedation for surgery)?  14. No - Do you have sleep apnea, excessive snoring, or daytime drowsiness?   15. No - Do you have any artifical heart valves or other implanted medical devices, such as a pacemaker, defibrillator, or continuous glucose monitor?  16. Yes - Do you have any artifical joints?  Hip.  17. Yes - Are you allergic to latex?   18. No - Is there any chance that you may be pregnant?    Health Care Directive: Patient has a Health Care Directive on file.  Review of Systems: 10 point ROS of systems including Constitutional, Eyes, Respiratory, Cardiovascular, Gastroenterology, Genitourinary, Integumentary, Muscularskeletal, Psychiatric were all negative except for pertinent positives noted in my HPI.  Patient Active Problem List    Diagnosis Date Noted     Ulcer of right foot, with necrosis of bone (H) 05/24/2023     Priority: Medium     Chronic systolic heart failure (H) 01/17/2023     Priority: Medium     Pressure ulcer of right heel, stage 3 (H) 01/13/2023     Priority: Medium     Pressure ulcer of left heel, stage 3 (H) 01/13/2023     Priority: Medium     Osteomyelitis of ankle and foot (H) 01/13/2023     Priority: Medium     PAD (peripheral artery disease) (H) 01/05/2023     Priority: Medium     Cellulitis and abscess of foot excluding toe 01/05/2023     Priority: Medium     Diabetic ulcer of toe of right foot associated with diabetes mellitus  due to underlying condition, with necrosis of bone (H) 01/05/2023     Priority: Medium     Diabetic ulcer of toe of right foot associated with diabetes mellitus due to underlying condition, limited to breakdown of skin (H) 01/05/2023     Priority: Medium     Open wound of both legs with complication 12/28/2022     Priority: Medium     BPH (benign prostatic hyperplasia) 12/06/2022     Priority: Medium     Diarrhea 12/06/2022     Priority: Medium     DM type 2 with diabetic mixed hyperlipidemia (H) 12/06/2022     Priority: Medium     Rectal discharge 10/10/2022     Priority: Medium     Long term current use of anticoagulant therapy 06/07/2022     Priority: Medium     Hyperkalemia 06/07/2022     Priority: Medium     Acute diastolic heart failure (H) 06/07/2022     Priority: Medium     Polyneuropathy due to type 2 diabetes mellitus (H) 06/07/2022     Priority: Medium     Status post amputation of foot (H) 06/07/2022     Priority: Medium     Type 2 diabetes mellitus with other circulatory complication, with long-term current use of insulin (H) 05/25/2022     Priority: Medium     Closed nondisplaced intertrochanteric fracture of left femur with routine healing, subsequent encounter 05/18/2022     Priority: Medium     Coronary atherosclerosis 05/17/2022     Priority: Medium     Hyperlipidemia 05/17/2022     Priority: Medium     Formatting of this note might be different from the original.  Created by Conversion       Typical atrial flutter (H) 05/17/2022     Priority: Medium     Formatting of this note might be different from the original.  Created by Conversion       Acute posthemorrhagic anemia 05/17/2022     Priority: Medium     Chronic obstructive pulmonary disease, unspecified (H) 05/17/2022     Priority: Medium     Personal history of other diseases of the circulatory system 05/17/2022     Priority: Medium     Occlusion and stenosis of right carotid artery 05/17/2022     Priority: Medium     Lymphedema, not elsewhere  classified 05/17/2022     Priority: Medium     Venous insufficiency (chronic) (peripheral) 05/17/2022     Priority: Medium     Essential hypertension 05/12/2022     Priority: Medium     Created by Conversion  Replacement Utility updated for latest IMO load         Ischemic cardiomyopathy 05/12/2022     Priority: Medium     Carotid stenosis, asymptomatic, right 05/12/2022     Priority: Medium     Chronic venous stasis dermatitis 05/12/2022     Priority: Medium     Persistent atrial fibrillation (H) 05/12/2022     Priority: Medium     Formatting of this note might be different from the original.  Was on sotalol prior to CABG but not since. Even metoprolol was stopped and he was in sinus rhythm on Holter in Dec 2018.    Formatting of this note might be different from the original.  Was on sotalol prior to CABG but not since. Even metoprolol was stopped and he was in sinus rhythm on Holter in Dec 2018.       Osteoarthrosis 05/09/2022     Priority: Medium     Neuropathy due to medical condition (H) 05/09/2022     Priority: Medium     Skin tear of lower leg without complication 05/09/2022     Priority: Medium     Foot amputee (H) 04/11/2022     Priority: Medium     Coronary artery disease of autologous bypass graft with stable angina pectoris (H) 03/14/2022     Priority: Medium     Left groin pain 02/28/2022     Priority: Medium     Contact with and (suspected) exposure to covid-19 12/13/2021     Priority: Medium     Constipation, unspecified 10/18/2021     Priority: Medium     Hair loss 10/18/2021     Priority: Medium     Long term (current) use of oral hypoglycemic drugs 05/03/2021     Priority: Medium     Stage 3b chronic kidney disease (H) 11/12/2020     Priority: Medium     Cholecystitis 10/14/2019     Priority: Medium     Clostridium difficile colitis 10/14/2019     Priority: Medium     Mild cognitive impairment 10/14/2019     Priority: Medium     Unspecified open wound, left lower leg, initial encounter 10/14/2019      Priority: Medium     Presence of aortocoronary bypass graft 10/14/2019     Priority: Medium     Fever 06/28/2018     Priority: Medium     Pneumonia 06/26/2018     Priority: Medium     Medically noncompliant 06/20/2018     Priority: Medium     Heart failure with preserved ejection fraction, NYHA class II (H) 01/25/2018     Priority: Medium     Peripheral vascular disease (H) 11/14/2017     Priority: Medium     Acquired lymphedema of lower extremity 10/16/2017     Priority: Medium     Polyneuropathy associated with underlying disease (H) 08/10/2017     Priority: Medium     Venous hypertension, chronic, bilateral 08/10/2017     Priority: Medium     Mcgarry's esophagus 01/01/2012     Priority: Medium     Formatting of this note might be different from the original.  per note of Dr. Tavo Mike of [facility]        Past Medical History:   Diagnosis Date     A-fib (H)      MESHA (acute kidney injury) (H)      Anemia      Atopic keratoconjunctivitis      Atrial fibrillation (H)     Moe Brian: 8/2011 Cardioversion; CHADS2 VASC = 5; he is on warfarin and sotalol      Atrial flutter (H)      Mcgarry's esophagus 01/01/2012    per note of Dr. Tavo Mike of [facility]     Bilateral lower leg cellulitis 08/31/2018     BPH (benign prostatic hyperplasia)      Candidiasis of perineum 01/03/2018     Carotid stenosis      Carotid stenosis, asymptomatic, right      CHF (congestive heart failure) (H)      Cholecystitis, acute 08/18/2019     Chronic systolic heart failure (H)      Chronic venous stasis dermatitis      Closed nondisplaced intertrochanteric fracture of left femur with routine healing, subsequent encounter 05/18/2022     Clostridium difficile colitis      COPD (chronic obstructive pulmonary disease) (H)      Coronary artery disease due to lipid rich plaque 2000    CABG x2     Diabetes (H)      Diabetic ulcer of both feet (H) 10/31/2017     Dyslexia      Dyslipidemia, goal LDL below 70 2000     Epistaxis      Essential  hypertension      Gangrene of left foot (H)      GERD (gastroesophageal reflux disease)      HLD (hyperlipidemia)      HTN (hypertension)      Hyponatremia      Ischemic heart disease      Lymphedema      Mild cognitive impairment      MRSA (methicillin resistant Staphylococcus aureus)      Neuropathy      Non-STEMI (non-ST elevated myocardial infarction) (H)      Occlusion and stenosis of right carotid artery      Osteoarthrosis      Osteomyelitis of ankle and foot (H)      Other atopic dermatitis      PAD (peripheral artery disease) (H)      Peripheral vascular disease (H)      Pneumonia 06/26/2018     Polyneuropathy due to type 2 diabetes mellitus (H)      Pressure ulcer, heel     Bilateral     Renal insufficiency      Type 2 diabetes mellitus, without long-term current use of insulin (H)      Unable to function independently 11/13/2017     Past Surgical History:   Procedure Laterality Date     AMPUTATE FOOT Left 11/05/2017    Procedure: LEFT TRANSMETATARSAL AMPUTATION;  Surgeon: Ever Wick MD;  Location: [facility];  Service:      AMPUTATE FOOT Right 4/27/2023    Procedure: Transmetatarsal amputation right foot;  Surgeon: Miguelangel Spears DPM;  Location: [facility]     AMPUTATE FOOT Right 5/15/2023    Procedure: partial calcanectomy;  Surgeon: Miguelangel Spears DPM;  Location: [facility] OR     BYPASS GRAFT ARTERY CORONARY N/A 03/06/2000    SVG to OM1, SVG to PDA     BYPASS GRAFT ARTERY CORONARY N/A 10/04/2018    redo CABG due to graft failure     CABG MEASURES GRP       CARDIOVERSION  08/25/2011     CV CORONARY ANGIOGRAM N/A 10/01/2018    Procedure: Coronary Angiogram;  Surgeon: Miki Mac MD;  Location: [facility];  Service:      INCISION AND DRAINAGE FOOT, COMBINED Right 5/15/2023    Procedure: INCISION AND DRAINAGE, right heel with,;  Surgeon: Miguelangel Spears DPM;  Location: [facility] OR     INGUINAL HERNIA REPAIR Bilateral 1969    and 1979     IR  EXTREMITY ANGIOGRAM BILATERAL  11/3/2017     IR LOWER EXTREMITY ANGIOGRAM LEFT  3/10/2023     IR LOWER EXTREMITY ANGIOGRAM RIGHT  1/24/2023     LAPAROSCOPIC CHOLECYSTECTOMY N/A 08/18/2019    Procedure: CHOLECYSTECTOMY, LAPAROSCOPIC;  Surgeon: Stewart Fountain MD;  Location: [facility];  Service: General     LENGTHEN TENDON ACHILLES Right 4/27/2023    Procedure: with Achilles tendon lengthening, debridement of right heel ulceration.;  Surgeon: Miguelangel Spears DPM;  Location: [facility]     Current Outpatient Medications   Medication Sig Dispense Refill     acetaminophen (TYLENOL) 325 MG tablet 1000 mg tid scheduled and 500 mg bid prn       albuterol (PROAIR HFA/PROVENTIL HFA/VENTOLIN HFA) 108 (90 Base) MCG/ACT inhaler Inhale 2 puffs into the lungs 2 times daily And q4h PRN       cetirizine (ZYRTEC) 10 MG tablet Take 5 mg by mouth daily       clopidogrel (PLAVIX) 75 MG tablet Take 1 tablet (75 mg) by mouth daily Start taking medication the day after the procedure. 90 tablet 1     dulaglutide (TRULICITY) 0.75 MG/0.5ML pen Inject 0.75 mg Subcutaneous every 7 days       furosemide (LASIX) 40 MG tablet Take 40 mg by mouth daily       gabapentin (NEURONTIN) 100 MG capsule Take 200 mg by mouth 2 times daily       glipiZIDE (GLUCOTROL) 10 MG tablet Take 2.5 mg by mouth 2 times daily (before meals)       ketoconazole (NIZORAL) 2 % external shampoo Apply topically twice a week Mon and Fri.       losartan (COZAAR) 25 MG tablet Take 12.5 mg by mouth daily       mineral oil-hydrophilic petrolatum (AQUAPHOR) external ointment Apply topically 2 times daily       omeprazole (PRILOSEC) 20 MG CR capsule Take 20 mg by mouth daily       oxyCODONE (ROXICODONE) 5 MG tablet Take 1-2 tablets (5-10 mg) by mouth every 6 hours as needed for moderate to severe pain 12 tablet 0     piperacillin-tazobactam (ZOSYN) 3-0.375 GM vial Inject 3.375 g into the vein every 8 hours for 42 days 4 hour infusion 1890 mL 0      "polyethylene glycol (MIRALAX) 17 g packet Take 1 packet by mouth daily       rivaroxaban ANTICOAGULANT (XARELTO) 15 MG TABS tablet Take by mouth daily (with dinner)       senna-docusate (SENOKOT-S/PERICOLACE) 8.6-50 MG tablet Take 1 tablet by mouth 2 times daily And BID PRN       simvastatin (ZOCOR) 40 MG tablet Take 40 mg by mouth At Bedtime       spironolactone (ALDACTONE) 25 MG tablet Take 25 mg by mouth daily       tamsulosin (FLOMAX) 0.4 MG capsule Take 0.4 mg by mouth daily       Allergies   Allergen Reactions     Cranberry Extract Itching and Rash     Doxycycline Rash     Latex Rash     Penicillin V Rash     Reaction occurred as a child. Patient tolerated Zosyn 2018, Cefazolin 10/2018, and has also tolerated Augmentin.     Penicillins Rash     Reaction occurred as a child. Patient tolerated Zosyn 2018, Cefazolin 10/2018, and has also tolerated Augmentin.      Social History     Tobacco Use     Smoking status: Former     Packs/day: 1.00     Years: 36.00     Pack years: 36.00     Types: Cigarettes     Start date: 1964     Quit date: 2000     Years since quittin.0     Smokeless tobacco: Never   Vaping Use     Vaping status: Not on file   Substance Use Topics     Alcohol use: Not Currently     Alcohol/week: 5.0 standard drinks of alcohol     Types: 1 Cans of beer, 4 Standard drinks or equivalent per week     History   Drug Use No     Objective:  /52   Pulse 65   Temp 98.1  F (36.7  C)   Resp 16   Ht 1.778 m (5' 10\")   Wt 81.2 kg (179 lb 1.6 oz)   SpO2 99%   BMI 25.70 kg/m    Physical Exam  GENERAL APPEARANCE: Alert, in no distress, cooperative.   ENT: Mouth/posterior oropharynx intact w/ moist mucous membranes, hearing acuity [place].  EYES: EOM, conjunctivae, lids, pupils and irises normal, PERRL2.   RESP: Respiratory effort good, no respiratory distress, Lung sounds clear/diminished. On RA.   CV: Auscultation of heart reveals S1, S2, rate controlled and rhythm irregular, no murmur, " no rub or gallop, Edema 1+ BLE. Peripheral pulses are 2+.  ABDOMEN: Normal bowel sounds, soft, non-tender abdomen, and no masses palpated.  SKIN: Inspection/Palpation of skin and subcutaneous tissue baseline w/ fragility. No wounds/rashes noted, except wounds on BLE which are covered at this time.   NEURO: CN II-XII at patient's baseline, sensation baseline PPS.  PSYCH: Insight, judgement, and memory are baseline impaired, affect and mood are happy/engaged.    Recent Labs   Lab Test 05/24/23  1250 05/15/23  0700 05/03/23  0900 04/12/23  0750 03/10/23  0853 01/24/23  0719   HGB  --   --  9.6* 11.7*   < > 10.8*   PLT  --   --  292 296   < > 174   INR  --   --   --   --   --  1.23*     --  139 140   < > 139   POTASSIUM 5.1  --  4.6 4.8   < > 5.5*   CR 1.27*  --  1.59* 1.28*   < > 1.93*   A1C 6.6* 6.7*  --   --   --   --     < > = values in this interval not displayed.      Diagnostics: Labs pending at this time.  Results will be reviewed when available. EKG required for peripheral arterial disease and not completed in the last 90 days.     Revised Cardiac Risk Index (RCRI): The patient has the following serious cardiovascular risks for perioperative complications:    Coronary Artery Disease (MI, positive stress test, angina, Qs on EKG) = 1 point.    Diabetes Mellitus (on Insulin) = 1 point     RCRI Interpretation: 2 points: Class III (moderate risk - 6.6% complication rate).    Estimated Functional Capacity: CANNOT perform 4 METS without symptoms.    Assessment & Plan   The proposed surgical procedure is considered INTERMEDIATE risk.    Preoperative Examination  S/P foot surgery, right  Osteomyelitis of right foot, unspecified type (H)  Encounter for management of wound VAC  Type 2 diabetes mellitus with other circulatory complication, with long-term current use of insulin (H)  Polyneuropathy due to type 2 diabetes mellitus (H)  PAD (peripheral artery disease) (H)  Stage 3a chronic kidney disease (H)  Acute on  chronic.  Tenuous.    Preop system guidelines followed, in conjunction with surgeon recommendations.    Though a previous EKG was found during his last preoperative period, this was not easily found in chart review today.  Will obtain a subsequent EKG.    Recent blood work done, but will  outside of the 30-day window, will need a CBC as noted below.    Surgery has given instruction on Xarelto and Plavix already, but typically they also give instruction on preoperative hygiene.  Defer to surgery on this.    Will place patient NPO after midnight.    Risk index discussed with patient, and patient does not want to undergo any other invasive testing or delay surgery.    Risks and Recommendations: The patient has the following additional risks and recommendations for perioperative complications:    Diabetes: Patient is not on insulin therapy: regular NPO guidelines can be followed.     Pulmonary: Incentive spirometry post-op    Additional Medication Instructions: Patient is to take all scheduled medications on the day of surgery EXCEPT for modifications listed below.    RECOMMENDATION:  APPROVAL GIVEN to proceed with proposed procedure pending review of diagnostic evaluation.    Orders:  1. NPO after midnight on  @ 0001.  2. Hygiene per surgeon.  3. 12-lead EKG x1, routine.   4. CBC x1 on .  5. Xarelto and Plavix per surgeon. Bridging not generally recommended w/ DOACs.   6. On  - DAY OF SURGERY, HOLD:    Trulicity.    Triamcinolone.    Lasix.    Zyrtec.    Glipizide.    Ketoconazole.    Protein supplement.    Senna S.    Spironolactone.  Dx: PreOp.     Signed Electronically by:   Dr. Yanna Dozier, KEVIN CNP DNP            Sincerely,        KEVIN Bansal CNP

## 2023-05-25 NOTE — H&P (VIEW-ONLY)
[facility]  [address] [place] [address]  Phone: [phone]  Fax: [phone]  Primary Provider: Adriano Dodd Physician  Pre-op Performing Provider: ELVER SEVILLA    PREOPERATIVE EVALUATION:  Today's date: 5/25/2023  Ever Crane is a 78 year old male who presents for a preoperative evaluation.  Surgical Information:  Surgery/Procedure: I&D of right foot w/ debridement of ulceration of bilateral heels.   Surgery Location: [facility] OR  Surgeon: Miguelangel Spears.   Surgery Date: 6/1/23.  Where patient plans to recover: At a TCU (Transitional Care Unit)    Subjective:  HPI related to upcoming procedure:   Ever continues to struggle with osteomyelitis of the right foot and has undergone several procedures with in the past few months surrounding this issue.  Once again, podiatry will take him under for further tissue removal and he is starting IV antibiotics per infectious disease.  PICC line was placed today.  Ever otherwise feels fine, stating that his pain is well controlled, he denies any headaches, dizziness, new shortness of breath.  He has a chronic cough, denies any fever or chills.  He says his appetite is very good, and he denies any nausea, heartburn, constipation, diarrhea.    1. Yes - Have you ever had a heart attack or stroke?   2. Yes - Have you ever had surgery on your heart or blood vessels, such as a stent, coronary (heart) bypass, or surgery on an artery in the head, neck, heart, or legs?   3. No - Do you have chest pain when you are physically active?  4. No - Do you have a history of heart failure?  5. No - Do you currently have a cold, bronchitis, or symptoms of other respiratory (head and chest) infections?  6. Yes - Do you have a cough, shortness of breath, or wheezing?  Chronic cough.  7. No - Do you or anyone in your family have a history of blood clots?  8. No - Do you or anyone in your family have a serious bleeding problem, such as  long-lasting bleeding after surgeries or cuts?  9. Yes - Have you ever had anemia or been told to take iron pills?   10. No - Have you had any abnormal blood loss such as black, tarry or bloody stools, or abnormal vaginal bleeding?  11. No - Have you ever had a blood transfusion?  12. Yes - Are you willing to have a blood transfusion if it is medically needed before, during, or after your surgery?  13. No - Have you or anyone in your family ever had problems with anesthesia (sedation for surgery)?  14. No - Do you have sleep apnea, excessive snoring, or daytime drowsiness?   15. No - Do you have any artifical heart valves or other implanted medical devices, such as a pacemaker, defibrillator, or continuous glucose monitor?  16. Yes - Do you have any artifical joints?  Hip.  17. Yes - Are you allergic to latex?   18. No - Is there any chance that you may be pregnant?    Health Care Directive: Patient has a Health Care Directive on file.  Review of Systems: 10 point ROS of systems including Constitutional, Eyes, Respiratory, Cardiovascular, Gastroenterology, Genitourinary, Integumentary, Muscularskeletal, Psychiatric were all negative except for pertinent positives noted in my HPI.  Patient Active Problem List    Diagnosis Date Noted     Ulcer of right foot, with necrosis of bone (H) 05/24/2023     Priority: Medium     Chronic systolic heart failure (H) 01/17/2023     Priority: Medium     Pressure ulcer of right heel, stage 3 (H) 01/13/2023     Priority: Medium     Pressure ulcer of left heel, stage 3 (H) 01/13/2023     Priority: Medium     Osteomyelitis of ankle and foot (H) 01/13/2023     Priority: Medium     PAD (peripheral artery disease) (H) 01/05/2023     Priority: Medium     Cellulitis and abscess of foot excluding toe 01/05/2023     Priority: Medium     Diabetic ulcer of toe of right foot associated with diabetes mellitus due to underlying condition, with necrosis of bone (H) 01/05/2023     Priority: Medium      Diabetic ulcer of toe of right foot associated with diabetes mellitus due to underlying condition, limited to breakdown of skin (H) 01/05/2023     Priority: Medium     Open wound of both legs with complication 12/28/2022     Priority: Medium     BPH (benign prostatic hyperplasia) 12/06/2022     Priority: Medium     Diarrhea 12/06/2022     Priority: Medium     DM type 2 with diabetic mixed hyperlipidemia (H) 12/06/2022     Priority: Medium     Rectal discharge 10/10/2022     Priority: Medium     Long term current use of anticoagulant therapy 06/07/2022     Priority: Medium     Hyperkalemia 06/07/2022     Priority: Medium     Acute diastolic heart failure (H) 06/07/2022     Priority: Medium     Polyneuropathy due to type 2 diabetes mellitus (H) 06/07/2022     Priority: Medium     Status post amputation of foot (H) 06/07/2022     Priority: Medium     Type 2 diabetes mellitus with other circulatory complication, with long-term current use of insulin (H) 05/25/2022     Priority: Medium     Closed nondisplaced intertrochanteric fracture of left femur with routine healing, subsequent encounter 05/18/2022     Priority: Medium     Coronary atherosclerosis 05/17/2022     Priority: Medium     Hyperlipidemia 05/17/2022     Priority: Medium     Formatting of this note might be different from the original.  Created by Conversion       Typical atrial flutter (H) 05/17/2022     Priority: Medium     Formatting of this note might be different from the original.  Created by Conversion       Acute posthemorrhagic anemia 05/17/2022     Priority: Medium     Chronic obstructive pulmonary disease, unspecified (H) 05/17/2022     Priority: Medium     Personal history of other diseases of the circulatory system 05/17/2022     Priority: Medium     Occlusion and stenosis of right carotid artery 05/17/2022     Priority: Medium     Lymphedema, not elsewhere classified 05/17/2022     Priority: Medium     Venous insufficiency (chronic)  (peripheral) 05/17/2022     Priority: Medium     Essential hypertension 05/12/2022     Priority: Medium     Created by Conversion  Replacement Utility updated for latest IMO load         Ischemic cardiomyopathy 05/12/2022     Priority: Medium     Carotid stenosis, asymptomatic, right 05/12/2022     Priority: Medium     Chronic venous stasis dermatitis 05/12/2022     Priority: Medium     Persistent atrial fibrillation (H) 05/12/2022     Priority: Medium     Formatting of this note might be different from the original.  Was on sotalol prior to CABG but not since. Even metoprolol was stopped and he was in sinus rhythm on Holter in Dec 2018.    Formatting of this note might be different from the original.  Was on sotalol prior to CABG but not since. Even metoprolol was stopped and he was in sinus rhythm on Holter in Dec 2018.       Osteoarthrosis 05/09/2022     Priority: Medium     Neuropathy due to medical condition (H) 05/09/2022     Priority: Medium     Skin tear of lower leg without complication 05/09/2022     Priority: Medium     Foot amputee (H) 04/11/2022     Priority: Medium     Coronary artery disease of autologous bypass graft with stable angina pectoris (H) 03/14/2022     Priority: Medium     Left groin pain 02/28/2022     Priority: Medium     Contact with and (suspected) exposure to covid-19 12/13/2021     Priority: Medium     Constipation, unspecified 10/18/2021     Priority: Medium     Hair loss 10/18/2021     Priority: Medium     Long term (current) use of oral hypoglycemic drugs 05/03/2021     Priority: Medium     Stage 3b chronic kidney disease (H) 11/12/2020     Priority: Medium     Cholecystitis 10/14/2019     Priority: Medium     Clostridium difficile colitis 10/14/2019     Priority: Medium     Mild cognitive impairment 10/14/2019     Priority: Medium     Unspecified open wound, left lower leg, initial encounter 10/14/2019     Priority: Medium     Presence of aortocoronary bypass graft 10/14/2019      Priority: Medium     Fever 06/28/2018     Priority: Medium     Pneumonia 06/26/2018     Priority: Medium     Medically noncompliant 06/20/2018     Priority: Medium     Heart failure with preserved ejection fraction, NYHA class II (H) 01/25/2018     Priority: Medium     Peripheral vascular disease (H) 11/14/2017     Priority: Medium     Acquired lymphedema of lower extremity 10/16/2017     Priority: Medium     Polyneuropathy associated with underlying disease (H) 08/10/2017     Priority: Medium     Venous hypertension, chronic, bilateral 08/10/2017     Priority: Medium     Mcgarry's esophagus 01/01/2012     Priority: Medium     Formatting of this note might be different from the original.  per note of Dr. Tavo Mike of [facility]        Past Medical History:   Diagnosis Date     A-fib (H)      MESHA (acute kidney injury) (H)      Anemia      Atopic keratoconjunctivitis      Atrial fibrillation (H)     Moe[place]unen: 8/2011 Cardioversion; CHADS2 VASC = 5; he is on warfarin and sotalol      Atrial flutter (H)      Mcgarry's esophagus 01/01/2012    per note of Dr. Tavo Mike of [facility]     Bilateral lower leg cellulitis 08/31/2018     BPH (benign prostatic hyperplasia)      Candidiasis of perineum 01/03/2018     Carotid stenosis      Carotid stenosis, asymptomatic, right      CHF (congestive heart failure) (H)      Cholecystitis, acute 08/18/2019     Chronic systolic heart failure (H)      Chronic venous stasis dermatitis      Closed nondisplaced intertrochanteric fracture of left femur with routine healing, subsequent encounter 05/18/2022     Clostridium difficile colitis      COPD (chronic obstructive pulmonary disease) (H)      Coronary artery disease due to lipid rich plaque 2000    CABG x2     Diabetes (H)      Diabetic ulcer of both feet (H) 10/31/2017     Dyslexia      Dyslipidemia, goal LDL below 70 2000     Epistaxis      Essential hypertension      Gangrene of left foot (H)      GERD (gastroesophageal reflux disease)       HLD (hyperlipidemia)      HTN (hypertension)      Hyponatremia      Ischemic heart disease      Lymphedema      Mild cognitive impairment      MRSA (methicillin resistant Staphylococcus aureus)      Neuropathy      Non-STEMI (non-ST elevated myocardial infarction) (H)      Occlusion and stenosis of right carotid artery      Osteoarthrosis      Osteomyelitis of ankle and foot (H)      Other atopic dermatitis      PAD (peripheral artery disease) (H)      Peripheral vascular disease (H)      Pneumonia 06/26/2018     Polyneuropathy due to type 2 diabetes mellitus (H)      Pressure ulcer, heel     Bilateral     Renal insufficiency      Type 2 diabetes mellitus, without long-term current use of insulin (H)      Unable to function independently 11/13/2017     Past Surgical History:   Procedure Laterality Date     AMPUTATE FOOT Left 11/05/2017    Procedure: LEFT TRANSMETATARSAL AMPUTATION;  Surgeon: Ever Wick MD;  Location: [facility];  Service:      AMPUTATE FOOT Right 4/27/2023    Procedure: Transmetatarsal amputation right foot;  Surgeon: Miguelangel Spears DPM;  Location: [facility]     AMPUTATE FOOT Right 5/15/2023    Procedure: partial calcanectomy;  Surgeon: Miguelangel Spears DPM;  Location: [facility] OR     BYPASS GRAFT ARTERY CORONARY N/A 03/06/2000    SVG to OM1, SVG to PDA     BYPASS GRAFT ARTERY CORONARY N/A 10/04/2018    redo CABG due to graft failure     CABG MEASURES GRP       CARDIOVERSION  08/25/2011     CV CORONARY ANGIOGRAM N/A 10/01/2018    Procedure: Coronary Angiogram;  Surgeon: Miki Mac MD;  Location: [facility];  Service:      INCISION AND DRAINAGE FOOT, COMBINED Right 5/15/2023    Procedure: INCISION AND DRAINAGE, right heel with,;  Surgeon: Miguelangel Spears DPM;  Location: [facility] OR     INGUINAL HERNIA REPAIR Bilateral 1969    and 1979     IR EXTREMITY ANGIOGRAM BILATERAL  11/3/2017     IR LOWER EXTREMITY ANGIOGRAM LEFT  3/10/2023      IR LOWER EXTREMITY ANGIOGRAM RIGHT  1/24/2023     LAPAROSCOPIC CHOLECYSTECTOMY N/A 08/18/2019    Procedure: CHOLECYSTECTOMY, LAPAROSCOPIC;  Surgeon: Stewart Fountain MD;  Location: [facility];  Service: General     LENGTHEN TENDON ACHILLES Right 4/27/2023    Procedure: with Achilles tendon lengthening, debridement of right heel ulceration.;  Surgeon: Miguelangel Spears DPM;  Location: [facility]     Current Outpatient Medications   Medication Sig Dispense Refill     acetaminophen (TYLENOL) 325 MG tablet 1000 mg tid scheduled and 500 mg bid prn       albuterol (PROAIR HFA/PROVENTIL HFA/VENTOLIN HFA) 108 (90 Base) MCG/ACT inhaler Inhale 2 puffs into the lungs 2 times daily And q4h PRN       cetirizine (ZYRTEC) 10 MG tablet Take 5 mg by mouth daily       clopidogrel (PLAVIX) 75 MG tablet Take 1 tablet (75 mg) by mouth daily Start taking medication the day after the procedure. 90 tablet 1     dulaglutide (TRULICITY) 0.75 MG/0.5ML pen Inject 0.75 mg Subcutaneous every 7 days       furosemide (LASIX) 40 MG tablet Take 40 mg by mouth daily       gabapentin (NEURONTIN) 100 MG capsule Take 200 mg by mouth 2 times daily       glipiZIDE (GLUCOTROL) 10 MG tablet Take 2.5 mg by mouth 2 times daily (before meals)       ketoconazole (NIZORAL) 2 % external shampoo Apply topically twice a week Mon and Fri.       losartan (COZAAR) 25 MG tablet Take 12.5 mg by mouth daily       mineral oil-hydrophilic petrolatum (AQUAPHOR) external ointment Apply topically 2 times daily       omeprazole (PRILOSEC) 20 MG CR capsule Take 20 mg by mouth daily       oxyCODONE (ROXICODONE) 5 MG tablet Take 1-2 tablets (5-10 mg) by mouth every 6 hours as needed for moderate to severe pain 12 tablet 0     piperacillin-tazobactam (ZOSYN) 3-0.375 GM vial Inject 3.375 g into the vein every 8 hours for 42 days 4 hour infusion 1890 mL 0     polyethylene glycol (MIRALAX) 17 g packet Take 1 packet by mouth daily       rivaroxaban  "ANTICOAGULANT (XARELTO) 15 MG TABS tablet Take by mouth daily (with dinner)       senna-docusate (SENOKOT-S/PERICOLACE) 8.6-50 MG tablet Take 1 tablet by mouth 2 times daily And BID PRN       simvastatin (ZOCOR) 40 MG tablet Take 40 mg by mouth At Bedtime       spironolactone (ALDACTONE) 25 MG tablet Take 25 mg by mouth daily       tamsulosin (FLOMAX) 0.4 MG capsule Take 0.4 mg by mouth daily       Allergies   Allergen Reactions     Cranberry Extract Itching and Rash     Doxycycline Rash     Latex Rash     Penicillin V Rash     Reaction occurred as a child. Patient tolerated Zosyn 2018, Cefazolin 10/2018, and has also tolerated Augmentin.     Penicillins Rash     Reaction occurred as a child. Patient tolerated Zosyn 2018, Cefazolin 10/2018, and has also tolerated Augmentin.      Social History     Tobacco Use     Smoking status: Former     Packs/day: 1.00     Years: 36.00     Pack years: 36.00     Types: Cigarettes     Start date: 1964     Quit date: 2000     Years since quittin.0     Smokeless tobacco: Never   Vaping Use     Vaping status: Not on file   Substance Use Topics     Alcohol use: Not Currently     Alcohol/week: 5.0 standard drinks of alcohol     Types: 1 Cans of beer, 4 Standard drinks or equivalent per week     History   Drug Use No     Objective:  /52   Pulse 65   Temp 98.1  F (36.7  C)   Resp 16   Ht 1.778 m (5' 10\")   Wt 81.2 kg (179 lb 1.6 oz)   SpO2 99%   BMI 25.70 kg/m    Physical Exam  GENERAL APPEARANCE: Alert, in no distress, cooperative.   ENT: Mouth/posterior oropharynx intact w/ moist mucous membranes, hearing acuity [place].  EYES: EOM, conjunctivae, lids, pupils and irises normal, PERRL2.   RESP: Respiratory effort good, no respiratory distress, Lung sounds clear/diminished. On RA.   CV: Auscultation of heart reveals S1, S2, rate controlled and rhythm irregular, no murmur, no rub or gallop, Edema 1+ BLE. Peripheral pulses are 2+.  ABDOMEN: Normal bowel sounds, " soft, non-tender abdomen, and no masses palpated.  SKIN: Inspection/Palpation of skin and subcutaneous tissue baseline w/ fragility. No wounds/rashes noted, except wounds on BLE which are covered at this time.   NEURO: CN II-XII at patient's baseline, sensation baseline PPS.  PSYCH: Insight, judgement, and memory are baseline impaired, affect and mood are happy/engaged.    Recent Labs   Lab Test 05/24/23  1250 05/15/23  0700 05/03/23  0900 04/12/23  0750 03/10/23  0853 01/24/23  0719   HGB  --   --  9.6* 11.7*   < > 10.8*   PLT  --   --  292 296   < > 174   INR  --   --   --   --   --  1.23*     --  139 140   < > 139   POTASSIUM 5.1  --  4.6 4.8   < > 5.5*   CR 1.27*  --  1.59* 1.28*   < > 1.93*   A1C 6.6* 6.7*  --   --   --   --     < > = values in this interval not displayed.      Diagnostics: Labs pending at this time.  Results will be reviewed when available. EKG required for peripheral arterial disease and not completed in the last 90 days.     Revised Cardiac Risk Index (RCRI): The patient has the following serious cardiovascular risks for perioperative complications:    Coronary Artery Disease (MI, positive stress test, angina, Qs on EKG) = 1 point.    Diabetes Mellitus (on Insulin) = 1 point     RCRI Interpretation: 2 points: Class III (moderate risk - 6.6% complication rate).    Estimated Functional Capacity: CANNOT perform 4 METS without symptoms.    Assessment & Plan   The proposed surgical procedure is considered INTERMEDIATE risk.    Preoperative Examination  S/P foot surgery, right  Osteomyelitis of right foot, unspecified type (H)  Encounter for management of wound VAC  Type 2 diabetes mellitus with other circulatory complication, with long-term current use of insulin (H)  Polyneuropathy due to type 2 diabetes mellitus (H)  PAD (peripheral artery disease) (H)  Stage 3a chronic kidney disease (H)  Acute on chronic.  Tenuous.    Preop system guidelines followed, in conjunction with surgeon  recommendations.    Though a previous EKG was found during his last preoperative period, this was not easily found in chart review today.  Will obtain a subsequent EKG.    Recent blood work done, but will  outside of the 30-day window, will need a CBC as noted below.    Surgery has given instruction on Xarelto and Plavix already, but typically they also give instruction on preoperative hygiene.  Defer to surgery on this.    Will place patient NPO after midnight.    Risk index discussed with patient, and patient does not want to undergo any other invasive testing or delay surgery.    Risks and Recommendations: The patient has the following additional risks and recommendations for perioperative complications:    Diabetes: Patient is not on insulin therapy: regular NPO guidelines can be followed.     Pulmonary: Incentive spirometry post-op    Additional Medication Instructions: Patient is to take all scheduled medications on the day of surgery EXCEPT for modifications listed below.    RECOMMENDATION:  APPROVAL GIVEN to proceed with proposed procedure pending review of diagnostic evaluation.    Orders:  1. NPO after midnight on  @ 0001.  2. Hygiene per surgeon.  3. 12-lead EKG x1, routine.   4. CBC x1 on .  5. Xarelto and Plavix per surgeon. Bridging not generally recommended w/ DOACs.   6. On  - DAY OF SURGERY, HOLD:    Trulicity.    Triamcinolone.    Lasix.    Zyrtec.    Glipizide.    Ketoconazole.    Protein supplement.    Senna S.    Spironolactone.  Dx: PreOp.     Signed Electronically by:   Dr. Yanna Dozier, APRN CNP DNP

## 2023-05-26 LAB
ALBUMIN SERPL BCG-MCNC: 3.4 G/DL (ref 3.5–5.2)
ALP SERPL-CCNC: 83 U/L (ref 40–129)
ALT SERPL W P-5'-P-CCNC: 5 U/L (ref 10–50)
ANION GAP SERPL CALCULATED.3IONS-SCNC: 11 MMOL/L (ref 7–15)
AST SERPL W P-5'-P-CCNC: 8 U/L (ref 10–50)
BASOPHILS # BLD AUTO: 0.1 10E3/UL (ref 0–0.2)
BASOPHILS NFR BLD AUTO: 1 %
BILIRUB SERPL-MCNC: 0.3 MG/DL
BUN SERPL-MCNC: 20.2 MG/DL (ref 8–23)
CALCIUM SERPL-MCNC: 9.1 MG/DL (ref 8.8–10.2)
CHLORIDE SERPL-SCNC: 102 MMOL/L (ref 98–107)
CREAT SERPL-MCNC: 1.21 MG/DL (ref 0.67–1.17)
CRP SERPL-MCNC: 63.94 MG/L
DEPRECATED HCO3 PLAS-SCNC: 26 MMOL/L (ref 22–29)
EOSINOPHIL # BLD AUTO: 0.4 10E3/UL (ref 0–0.7)
EOSINOPHIL NFR BLD AUTO: 4 %
ERYTHROCYTE [DISTWIDTH] IN BLOOD BY AUTOMATED COUNT: 15.4 % (ref 10–15)
GFR SERPL CREATININE-BSD FRML MDRD: 61 ML/MIN/1.73M2
GLUCOSE SERPL-MCNC: 202 MG/DL (ref 70–99)
HCT VFR BLD AUTO: 30.9 % (ref 40–53)
HGB BLD-MCNC: 9.4 G/DL (ref 13.3–17.7)
IMM GRANULOCYTES # BLD: 0 10E3/UL
IMM GRANULOCYTES NFR BLD: 0 %
LYMPHOCYTES # BLD AUTO: 1.4 10E3/UL (ref 0.8–5.3)
LYMPHOCYTES NFR BLD AUTO: 14 %
MCH RBC QN AUTO: 25.4 PG (ref 26.5–33)
MCHC RBC AUTO-ENTMCNC: 30.4 G/DL (ref 31.5–36.5)
MCV RBC AUTO: 84 FL (ref 78–100)
MONOCYTES # BLD AUTO: 0.7 10E3/UL (ref 0–1.3)
MONOCYTES NFR BLD AUTO: 7 %
NEUTROPHILS # BLD AUTO: 6.9 10E3/UL (ref 1.6–8.3)
NEUTROPHILS NFR BLD AUTO: 74 %
NRBC # BLD AUTO: 0 10E3/UL
NRBC BLD AUTO-RTO: 0 /100
PLATELET # BLD AUTO: 234 10E3/UL (ref 150–450)
POTASSIUM SERPL-SCNC: 4.3 MMOL/L (ref 3.4–5.3)
PROT SERPL-MCNC: 7.4 G/DL (ref 6.4–8.3)
RBC # BLD AUTO: 3.7 10E6/UL (ref 4.4–5.9)
SODIUM SERPL-SCNC: 139 MMOL/L (ref 136–145)
WBC # BLD AUTO: 9.4 10E3/UL (ref 4–11)

## 2023-05-26 PROCEDURE — 86140 C-REACTIVE PROTEIN: CPT | Mod: ORL | Performed by: NURSE PRACTITIONER

## 2023-05-26 PROCEDURE — P9604 ONE-WAY ALLOW PRORATED TRIP: HCPCS | Mod: ORL | Performed by: NURSE PRACTITIONER

## 2023-05-26 PROCEDURE — 85025 COMPLETE CBC W/AUTO DIFF WBC: CPT | Mod: ORL | Performed by: NURSE PRACTITIONER

## 2023-05-26 PROCEDURE — 36415 COLL VENOUS BLD VENIPUNCTURE: CPT | Mod: ORL | Performed by: NURSE PRACTITIONER

## 2023-05-26 PROCEDURE — 80053 COMPREHEN METABOLIC PANEL: CPT | Mod: ORL | Performed by: NURSE PRACTITIONER

## 2023-05-30 ENCOUNTER — LAB REQUISITION (OUTPATIENT)
Dept: LAB | Facility: CLINIC | Age: 78
End: 2023-05-30
Payer: MEDICARE

## 2023-05-30 ENCOUNTER — TELEPHONE (OUTPATIENT)
Dept: INFECTIOUS DISEASES | Facility: CLINIC | Age: 78
End: 2023-05-30
Payer: MEDICARE

## 2023-05-30 DIAGNOSIS — N18.30 CHRONIC KIDNEY DISEASE, STAGE 3 UNSPECIFIED (H): ICD-10-CM

## 2023-05-30 DIAGNOSIS — Z47.81 ENCOUNTER FOR ORTHOPEDIC AFTERCARE FOLLOWING SURGICAL AMPUTATION: ICD-10-CM

## 2023-05-30 NOTE — LETTER
June 2, 2023      Ever Crane  [address]  [address]  [place] [address]        Dear ,    We are writing to inform you of your test results.    Your labs look fine overall. Inflammation consistent with infection. MRSA testing was negative from nares, this can be checked twice more in the future and if negative then he would no longer need isolation precautions.       If you have any questions or concerns, please call the clinic at [phone].      Sincerely,    Wagner Cr MD

## 2023-05-30 NOTE — TELEPHONE ENCOUNTER
Sister Mulu Peterson called back, she will be home tonight and will call Merari tomorrow when she is home where she has better service.

## 2023-05-31 LAB
ERYTHROCYTE [DISTWIDTH] IN BLOOD BY AUTOMATED COUNT: 15.6 % (ref 10–15)
HCT VFR BLD AUTO: 29.8 % (ref 40–53)
HGB BLD-MCNC: 9.2 G/DL (ref 13.3–17.7)
MCH RBC QN AUTO: 25.6 PG (ref 26.5–33)
MCHC RBC AUTO-ENTMCNC: 30.9 G/DL (ref 31.5–36.5)
MCV RBC AUTO: 83 FL (ref 78–100)
PLATELET # BLD AUTO: 237 10E3/UL (ref 150–450)
RBC # BLD AUTO: 3.59 10E6/UL (ref 4.4–5.9)
WBC # BLD AUTO: 6.8 10E3/UL (ref 4–11)

## 2023-05-31 PROCEDURE — 36415 COLL VENOUS BLD VENIPUNCTURE: CPT | Mod: ORL | Performed by: NURSE PRACTITIONER

## 2023-05-31 PROCEDURE — P9603 ONE-WAY ALLOW PRORATED MILES: HCPCS | Mod: ORL | Performed by: NURSE PRACTITIONER

## 2023-05-31 PROCEDURE — 85027 COMPLETE CBC AUTOMATED: CPT | Mod: ORL | Performed by: NURSE PRACTITIONER

## 2023-05-31 NOTE — TELEPHONE ENCOUNTER
LM for Mulu to see if she had any other questions in regards to yesterdays conversation:    labs look fine overall. Inflammation consistent with infection. MRSA testing was negative from nares, this can be checked twice more in the future and if negative then he would no longer need isolation precautions.     Will be retested at visit on 6/21

## 2023-06-01 ENCOUNTER — ANESTHESIA (OUTPATIENT)
Dept: SURGERY | Facility: HOSPITAL | Age: 78
End: 2023-06-01
Payer: MEDICARE

## 2023-06-01 ENCOUNTER — ANESTHESIA EVENT (OUTPATIENT)
Dept: SURGERY | Facility: HOSPITAL | Age: 78
End: 2023-06-01
Payer: MEDICARE

## 2023-06-01 ENCOUNTER — HOSPITAL ENCOUNTER (OUTPATIENT)
Facility: HOSPITAL | Age: 78
Discharge: OTHER INSTITUTION | End: 2023-06-01
Attending: PODIATRIST | Admitting: PODIATRIST
Payer: MEDICARE

## 2023-06-01 ENCOUNTER — LAB REQUISITION (OUTPATIENT)
Dept: LAB | Facility: CLINIC | Age: 78
End: 2023-06-01
Payer: MEDICARE

## 2023-06-01 VITALS
RESPIRATION RATE: 20 BRPM | HEART RATE: 66 BPM | DIASTOLIC BLOOD PRESSURE: 60 MMHG | SYSTOLIC BLOOD PRESSURE: 124 MMHG | OXYGEN SATURATION: 99 % | TEMPERATURE: 97.5 F | WEIGHT: 175.9 LBS | HEIGHT: 70 IN | BODY MASS INDEX: 25.18 KG/M2

## 2023-06-01 DIAGNOSIS — L97.514 ULCER OF RIGHT FOOT WITH NECROSIS OF BONE (H): ICD-10-CM

## 2023-06-01 DIAGNOSIS — L97.514 ULCER OF RIGHT FOOT, WITH NECROSIS OF BONE (H): Primary | ICD-10-CM

## 2023-06-01 DIAGNOSIS — L97.421 NON-PRESSURE CHRONIC ULCER OF LEFT HEEL AND MIDFOOT LIMITED TO BREAKDOWN OF SKIN (H): ICD-10-CM

## 2023-06-01 LAB
GLUCOSE BLDC GLUCOMTR-MCNC: 117 MG/DL (ref 70–99)
GRAM STAIN RESULT: NORMAL
GRAM STAIN RESULT: NORMAL

## 2023-06-01 PROCEDURE — 88311 DECALCIFY TISSUE: CPT | Mod: TC | Performed by: PODIATRIST

## 2023-06-01 PROCEDURE — 11046 DBRDMT MUSC&/FSCA EA ADDL: CPT | Mod: 78 | Performed by: PODIATRIST

## 2023-06-01 PROCEDURE — 250N000009 HC RX 250: Performed by: ANESTHESIOLOGY

## 2023-06-01 PROCEDURE — 250N000011 HC RX IP 250 OP 636: Performed by: ANESTHESIOLOGY

## 2023-06-01 PROCEDURE — 258N000003 HC RX IP 258 OP 636: Performed by: NURSE ANESTHETIST, CERTIFIED REGISTERED

## 2023-06-01 PROCEDURE — 87070 CULTURE OTHR SPECIMN AEROBIC: CPT | Performed by: PODIATRIST

## 2023-06-01 PROCEDURE — 250N000011 HC RX IP 250 OP 636: Performed by: PODIATRIST

## 2023-06-01 PROCEDURE — 250N000009 HC RX 250: Performed by: NURSE ANESTHETIST, CERTIFIED REGISTERED

## 2023-06-01 PROCEDURE — 28122 PARTIAL REMOVAL OF FOOT BONE: CPT | Mod: 78 | Performed by: PODIATRIST

## 2023-06-01 PROCEDURE — 272N000001 HC OR GENERAL SUPPLY STERILE: Performed by: PODIATRIST

## 2023-06-01 PROCEDURE — 11047 DBRDMT BONE EACH ADDL: CPT | Mod: 78 | Performed by: PODIATRIST

## 2023-06-01 PROCEDURE — 87205 SMEAR GRAM STAIN: CPT | Performed by: PODIATRIST

## 2023-06-01 PROCEDURE — 370N000017 HC ANESTHESIA TECHNICAL FEE, PER MIN: Performed by: PODIATRIST

## 2023-06-01 PROCEDURE — 710N000012 HC RECOVERY PHASE 2, PER MINUTE: Performed by: PODIATRIST

## 2023-06-01 PROCEDURE — 11042 DBRDMT SUBQ TIS 1ST 20SQCM/<: CPT | Mod: 78 | Performed by: PODIATRIST

## 2023-06-01 PROCEDURE — 11044 DBRDMT BONE 1ST 20 SQ CM/<: CPT | Mod: 78 | Performed by: PODIATRIST

## 2023-06-01 PROCEDURE — 11043 DBRDMT MUSC&/FSCA 1ST 20/<: CPT | Mod: 78 | Performed by: PODIATRIST

## 2023-06-01 PROCEDURE — 250N000011 HC RX IP 250 OP 636: Performed by: NURSE ANESTHETIST, CERTIFIED REGISTERED

## 2023-06-01 PROCEDURE — 82962 GLUCOSE BLOOD TEST: CPT

## 2023-06-01 PROCEDURE — 87075 CULTR BACTERIA EXCEPT BLOOD: CPT | Performed by: PODIATRIST

## 2023-06-01 PROCEDURE — 360N000075 HC SURGERY LEVEL 2, PER MIN: Performed by: PODIATRIST

## 2023-06-01 PROCEDURE — 999N000141 HC STATISTIC PRE-PROCEDURE NURSING ASSESSMENT: Performed by: PODIATRIST

## 2023-06-01 RX ORDER — PROPOFOL 10 MG/ML
INJECTION, EMULSION INTRAVENOUS CONTINUOUS PRN
Status: DISCONTINUED | OUTPATIENT
Start: 2023-06-01 | End: 2023-06-01

## 2023-06-01 RX ORDER — SODIUM CHLORIDE, SODIUM LACTATE, POTASSIUM CHLORIDE, CALCIUM CHLORIDE 600; 310; 30; 20 MG/100ML; MG/100ML; MG/100ML; MG/100ML
INJECTION, SOLUTION INTRAVENOUS CONTINUOUS
Status: DISCONTINUED | OUTPATIENT
Start: 2023-06-01 | End: 2023-06-01 | Stop reason: HOSPADM

## 2023-06-01 RX ORDER — CEFAZOLIN SODIUM/WATER 2 G/20 ML
2 SYRINGE (ML) INTRAVENOUS SEE ADMIN INSTRUCTIONS
Status: DISCONTINUED | OUTPATIENT
Start: 2023-06-01 | End: 2023-06-01 | Stop reason: HOSPADM

## 2023-06-01 RX ORDER — CEFAZOLIN SODIUM/WATER 2 G/20 ML
2 SYRINGE (ML) INTRAVENOUS
Status: COMPLETED | OUTPATIENT
Start: 2023-06-01 | End: 2023-06-01

## 2023-06-01 RX ORDER — OXYCODONE HYDROCHLORIDE 5 MG/1
5 TABLET ORAL EVERY 6 HOURS PRN
Qty: 12 TABLET | Refills: 0 | Status: SHIPPED | OUTPATIENT
Start: 2023-06-01 | End: 2023-06-28

## 2023-06-01 RX ORDER — FENTANYL CITRATE 50 UG/ML
25-100 INJECTION, SOLUTION INTRAMUSCULAR; INTRAVENOUS
Status: DISCONTINUED | OUTPATIENT
Start: 2023-06-01 | End: 2023-06-01 | Stop reason: HOSPADM

## 2023-06-01 RX ORDER — NALOXONE HYDROCHLORIDE 1 MG/ML
0.2 INJECTION INTRAMUSCULAR; INTRAVENOUS; SUBCUTANEOUS
Status: DISCONTINUED | OUTPATIENT
Start: 2023-06-01 | End: 2023-06-01 | Stop reason: HOSPADM

## 2023-06-01 RX ORDER — GLYCOPYRROLATE 0.2 MG/ML
INJECTION, SOLUTION INTRAMUSCULAR; INTRAVENOUS PRN
Status: DISCONTINUED | OUTPATIENT
Start: 2023-06-01 | End: 2023-06-01

## 2023-06-01 RX ORDER — LIDOCAINE 40 MG/G
CREAM TOPICAL
Status: DISCONTINUED | OUTPATIENT
Start: 2023-06-01 | End: 2023-06-01 | Stop reason: HOSPADM

## 2023-06-01 RX ORDER — FENTANYL CITRATE 50 UG/ML
50 INJECTION, SOLUTION INTRAMUSCULAR; INTRAVENOUS EVERY 5 MIN PRN
Status: DISCONTINUED | OUTPATIENT
Start: 2023-06-01 | End: 2023-06-01 | Stop reason: HOSPADM

## 2023-06-01 RX ORDER — NALOXONE HYDROCHLORIDE 1 MG/ML
0.4 INJECTION INTRAMUSCULAR; INTRAVENOUS; SUBCUTANEOUS
Status: DISCONTINUED | OUTPATIENT
Start: 2023-06-01 | End: 2023-06-01 | Stop reason: HOSPADM

## 2023-06-01 RX ORDER — CLINDAMYCIN PHOSPHATE 900 MG/50ML
900 INJECTION, SOLUTION INTRAVENOUS SEE ADMIN INSTRUCTIONS
Status: DISCONTINUED | OUTPATIENT
Start: 2023-06-01 | End: 2023-06-01 | Stop reason: ALTCHOICE

## 2023-06-01 RX ORDER — FENTANYL CITRATE 50 UG/ML
25 INJECTION, SOLUTION INTRAMUSCULAR; INTRAVENOUS EVERY 5 MIN PRN
Status: DISCONTINUED | OUTPATIENT
Start: 2023-06-01 | End: 2023-06-01 | Stop reason: HOSPADM

## 2023-06-01 RX ORDER — ONDANSETRON 2 MG/ML
4 INJECTION INTRAMUSCULAR; INTRAVENOUS EVERY 30 MIN PRN
Status: DISCONTINUED | OUTPATIENT
Start: 2023-06-01 | End: 2023-06-01 | Stop reason: HOSPADM

## 2023-06-01 RX ORDER — BUPIVACAINE HYDROCHLORIDE 5 MG/ML
INJECTION, SOLUTION EPIDURAL; INTRACAUDAL
Status: COMPLETED | OUTPATIENT
Start: 2023-06-01 | End: 2023-06-01

## 2023-06-01 RX ORDER — PROPOFOL 10 MG/ML
INJECTION, EMULSION INTRAVENOUS PRN
Status: DISCONTINUED | OUTPATIENT
Start: 2023-06-01 | End: 2023-06-01

## 2023-06-01 RX ORDER — ONDANSETRON 4 MG/1
4 TABLET, ORALLY DISINTEGRATING ORAL EVERY 30 MIN PRN
Status: DISCONTINUED | OUTPATIENT
Start: 2023-06-01 | End: 2023-06-01 | Stop reason: HOSPADM

## 2023-06-01 RX ORDER — LIDOCAINE HYDROCHLORIDE 10 MG/ML
INJECTION, SOLUTION INFILTRATION; PERINEURAL PRN
Status: DISCONTINUED | OUTPATIENT
Start: 2023-06-01 | End: 2023-06-01

## 2023-06-01 RX ORDER — CLINDAMYCIN PHOSPHATE 900 MG/50ML
900 INJECTION, SOLUTION INTRAVENOUS
Status: DISCONTINUED | OUTPATIENT
Start: 2023-06-01 | End: 2023-06-01 | Stop reason: ALTCHOICE

## 2023-06-01 RX ORDER — SODIUM CHLORIDE, SODIUM LACTATE, POTASSIUM CHLORIDE, CALCIUM CHLORIDE 600; 310; 30; 20 MG/100ML; MG/100ML; MG/100ML; MG/100ML
INJECTION, SOLUTION INTRAVENOUS CONTINUOUS PRN
Status: DISCONTINUED | OUTPATIENT
Start: 2023-06-01 | End: 2023-06-01

## 2023-06-01 RX ADMIN — GLYCOPYRROLATE 0.2 MG: 0.2 INJECTION INTRAMUSCULAR; INTRAVENOUS at 09:43

## 2023-06-01 RX ADMIN — SODIUM CHLORIDE, POTASSIUM CHLORIDE, SODIUM LACTATE AND CALCIUM CHLORIDE: 600; 310; 30; 20 INJECTION, SOLUTION INTRAVENOUS at 08:53

## 2023-06-01 RX ADMIN — Medication 2 G: at 09:21

## 2023-06-01 RX ADMIN — GLYCOPYRROLATE 0.2 MG: 0.2 INJECTION INTRAMUSCULAR; INTRAVENOUS at 09:38

## 2023-06-01 RX ADMIN — BUPIVACAINE HYDROCHLORIDE 8 ML: 5 INJECTION, SOLUTION EPIDURAL; INTRACAUDAL at 08:47

## 2023-06-01 RX ADMIN — LIDOCAINE HYDROCHLORIDE 25 MG: 10 INJECTION, SOLUTION INFILTRATION; PERINEURAL at 09:24

## 2023-06-01 RX ADMIN — MIDAZOLAM HYDROCHLORIDE 1 MG: 1 INJECTION, SOLUTION INTRAMUSCULAR; INTRAVENOUS at 08:40

## 2023-06-01 RX ADMIN — MEPIVACAINE HYDROCHLORIDE 5 ML: 20 INJECTION, SOLUTION EPIDURAL; INFILTRATION at 08:44

## 2023-06-01 RX ADMIN — PROPOFOL 100 MCG/KG/MIN: 10 INJECTION, EMULSION INTRAVENOUS at 09:23

## 2023-06-01 RX ADMIN — FENTANYL CITRATE 50 MCG: 50 INJECTION, SOLUTION INTRAMUSCULAR; INTRAVENOUS at 08:40

## 2023-06-01 RX ADMIN — BUPIVACAINE HYDROCHLORIDE 20 ML: 5 INJECTION, SOLUTION EPIDURAL; INTRACAUDAL; PERINEURAL at 08:44

## 2023-06-01 RX ADMIN — PROPOFOL 10 MG: 10 INJECTION, EMULSION INTRAVENOUS at 09:27

## 2023-06-01 ASSESSMENT — ACTIVITIES OF DAILY LIVING (ADL): ADLS_ACUITY_SCORE: 26

## 2023-06-01 ASSESSMENT — COPD QUESTIONNAIRES: COPD: 1

## 2023-06-01 NOTE — ANESTHESIA CARE TRANSFER NOTE
Patient: Ever Crane    Procedure: Procedure(s):  INCISION AND DRAINAGE, right foot with debridement of ulceration bilateral heels       Diagnosis: Ulcer of right foot with necrosis of bone (H) [L97.514]  Diagnosis Additional Information: No value filed.    Anesthesia Type:   General     Note:    Oropharynx: oropharynx clear of all foreign objects and spontaneously breathing  Level of Consciousness: drowsy  Oxygen Supplementation: face mask  Level of Supplemental Oxygen (L/min / FiO2): 6  Independent Airway: airway patency satisfactory and stable  Dentition: dentition unchanged  Vital Signs Stable: post-procedure vital signs reviewed and stable  Report to RN Given: handoff report given  Patient transferred to: Phase II    Handoff Report: Identifed the Patient, Identified the Reponsible Provider, Reviewed the pertinent medical history, Discussed the surgical course, Reviewed Intra-OP anesthesia mangement and issues during anesthesia, Set expectations for post-procedure period and Allowed opportunity for questions and acknowledgement of understanding      Vitals:  Vitals Value Taken Time   BP     Temp     Pulse     Resp     SpO2         Electronically Signed By: KEVIN Gavin CRNA  June 1, 2023  10:01 AM

## 2023-06-01 NOTE — ANESTHESIA PROCEDURE NOTES
Adductor canal Procedure Note    Pre-Procedure   Staff -        Anesthesiologist:  Conrad Mirza MD       Performed By: anesthesiologist       Location: pre-op       Procedure Start/Stop Times: 6/1/2023 8:47 AM and 6/1/2023 8:49 AM       Pre-Anesthestic Checklist: patient identified, IV checked, site marked, risks and benefits discussed, informed consent, monitors and equipment checked, pre-op evaluation, at physician/surgeon's request and post-op pain management  Timeout:       Correct Patient: Yes        Correct Procedure: Yes        Correct Site: Yes        Correct Position: Yes        Correct Laterality: Yes        Site Marked: Yes  Procedure Documentation  Procedure: Adductor canal       Diagnosis: FOOT INFECTION       Laterality: right       Patient Position: supine       Patient Prep/Sterile Barriers: sterile gloves, mask       Skin prep: Chloraprep       Needle Type: short bevel and insulated       Needle Gauge: 20.        Needle Length (Inches): 4        Ultrasound guided       1. Ultrasound was used to identify targeted nerve, plexus, vascular marker, or fascial plane and place a needle adjacent to it in real-time.       2. Ultrasound was used to visualize the spread of anesthetic in close proximity to the above referenced structure.       3. A permanent image is entered into the patient's record.       4. The visualized anatomic structures appeared normal.       5. There were no apparent abnormal pathologic findings.    Assessment/Narrative         The placement was negative for: blood aspirated, painful injection and site bleeding       Paresthesias: No.       Bolus given via needle. no blood aspirated via catheter.        Secured via.        Insertion/Infusion Method: Single Shot       Complications: none    Medication(s) Administered   Bupivacaine 0.5% PF (Infiltration) - Infiltration   8 mL - 6/1/2023 8:47:00 AM  Mepivacaine 2% PF (Perineural) - Perineural   4 mL - 6/1/2023 8:47:00  "AM  Medication Administration Time: 6/1/2023 8:47 AM      FOR [facility] ONLY:   Pain Team Contact information: please page the Pain Team Via [facility]. Search \"Pain\". During daytime hours, please page the attending first. At night please page the resident first.      "

## 2023-06-01 NOTE — INTERVAL H&P NOTE
"I have reviewed the surgical (or preoperative) H&P that is linked to this encounter, and examined the patient. There are no significant changes    Clinical Conditions Present on Arrival:  Clinically Significant Risk Factors Present on Admission              # Hypoalbuminemia: Lowest albumin = 3.4 g/dL in the past 30 days , will monitor as appropriate   # Drug Induced Coagulation Defect: home medication list includes an anticoagulant medication  # Drug Induced Platelet Defect: home medication list includes an antiplatelet medication  # DMII: A1C = 6.6 % (Ref range: 0.0 - 5.6 %) within past 6 months  # Overweight: Estimated body mass index is 25.24 kg/m  as calculated from the following:    Height as of this encounter: 1.778 m (5' 10\").    Weight as of this encounter: 79.8 kg (175 lb 14.4 oz).       "

## 2023-06-01 NOTE — ANESTHESIA POSTPROCEDURE EVALUATION
Patient: Ever Crane    Procedure: Procedure(s):  INCISION AND DRAINAGE, right foot with debridement of ulceration bilateral heels       Anesthesia Type:  General    Note:  Disposition: Outpatient   Postop Pain Control: Uneventful            Sign Out: Well controlled pain   PONV: No   Neuro/Psych: Uneventful            Sign Out: Acceptable/Baseline neuro status   Airway/Respiratory: Uneventful            Sign Out: Acceptable/Baseline resp. status   CV/Hemodynamics: Uneventful            Sign Out: Acceptable CV status; No obvious hypovolemia; No obvious fluid overload   Other NRE: NONE   DID A NON-ROUTINE EVENT OCCUR? No           Last vitals:  Vitals Value Taken Time   /59 06/01/23 1015   Temp 36.4  C (97.5  F) 06/01/23 1002   Pulse 83 06/01/23 1018   Resp 20 06/01/23 1002   SpO2 100 % 06/01/23 1018   Vitals shown include unvalidated device data.    Electronically Signed By: Conrad Mirza MD  June 1, 2023  10:20 AM

## 2023-06-01 NOTE — OP NOTE
Date: 6/1/2023     Surgeon: ANDRES Spears DPM    Preoperative diagnosis:   1.  Ulceration right foot  2.  Ulceration right heel  3.  Ulceration left heel  4.  Osteomyelitis calcaneus right    Postoperative diagnosis: Same    Procedure:   1.  Incision and drainage right foot  2.  Exostectomy first metatarsal right foot  3.  Excisional Debridement ulceration right foot into bone  4.  Excisional debridement ulceration right heel into muscle  5.  Excisional debridement ulceration left heel into subcutaneous tissue    Anesthesia: MAC with Block     Hemostasis: Pneumatic ankle tourniquet 250 mmHg right    Pathology:   ID Type Source Tests Collected by Time Destination   1 : Metatarsal right foot Tissue Foot, Right SURGICAL PATHOLOGY EXAM Miguelangel Spears DPM 6/1/2023  9:43 AM    A :  Tissue Foot, Right ANAEROBIC BACTERIAL CULTURE ROUTINE, GRAM STAIN, AEROBIC BACTERIAL CULTURE ROUTINE Miguelangel Spears DPM 6/1/2023  9:42 AM         Injectables: None    Materials: None    Complications: None    Blood loss: 3 cc    Findings: Patient presents for operative intervention for nonviable tissue along the distal right foot and bilateral heel ulcerations.  I discussed today's procedure with the patient to include removal of all nonviable tissue along the distal right foot which may include resection of bone if necessary.  We also discussed removal of nonviable tissue bilateral heels if necessary.  Risks include not limited to need for additional surgical intervention and loss of limb.  Consent has been obtained.  Conservative measures were attempted and exhausted. Patient questions invited and answered, including appropriate risk, benefits and complications. No guarantees given or implied. Patient has been NPO.    Description: Patient was brought to the operating room and placed on the table in supine position. IV-sedation with popliteal block was administered by the anesthesia department. The foot was then prepped and  draped in usual aseptic manner. The right extremity was elevated and exsanguinated. Well-padded ankle pneumatic tourniquet was inflated to 250mmHg and the following procedure was then performed: Attention was directed to the ulceration along the distal right foot where significant nonviable tissue was noted especially along the plantar skin margin with undermining.  The non-viable tissue was sharply excisionally debrided with a #10 blade into bone and removed from the surgical site.  At this time there is noted to be exposed bone along the medial aspect of the ulceration likely corresponding to the first metatarsal.  A rongeur was then used to resect the nonviable bone which was sent to pathology and also for aerobic and anaerobic culture. Next, a 1000cc pulse lavage was used to irrigate the surgical site. Following irrigation and debridement, no remaining non-viable tissue was noted.    The resulting ulceration measured 17 x 6 x 2 cm. Sharp, excisional debridement was performed with a #15 blade into bone debriding 102 sq cm.     Attention was then directed the patient's right heel ulceration which measured 5 x 5 x 1 cm.  Sharp, excisional debridement with a #15 blade was then performed into muscle debriding 25 cm .    Attention was then directed the patient's left heel ulceration which measured 4 x 4 x 0.3 cm.  Sharp, excisional debridement of the ulceration was performed with a #15 blade into subcutaneous tissue debriding 16 cm .    Dressings consistent of 4x4's, ABD, kerlix/Nieves roll and an ace wrap. The right pneumatic tourniquet was released and a hyperemic response was noted to the remaining digits on the right foot.     The patient appeared to tolerate all the procedures and anesthesia well without apparent complications. Patient was transported from the operating room to the recovery room with vital signs stable and neurovascular status as it was pre-operatively to the bilateral feet.  Surgical dressings to  remain intact until wound VAC is applied to the right heel and distal right foot.  He will also continue with current wound dressings on the left heel.  Nonweightbearing right foot.  Antibiotics per ID.  I like to see him for follow-up in clinic in approximately 2 weeks.    Miguelangel Spears DPM

## 2023-06-01 NOTE — ANESTHESIA PREPROCEDURE EVALUATION
Anesthesia Pre-Procedure Evaluation    Patient: Ever Crane   MRN: [id number] : 1945        Procedure : Procedure(s):  INCISION AND DRAINAGE, right heel with partial calcanectomy          Past Medical History:   Diagnosis Date     A-fib (H)      MESHA (acute kidney injury) (H)      Anemia      Atopic keratoconjunctivitis      Atrial fibrillation (H)     Brian Moe: 2011 Cardioversion; CHADS2 VASC = 5; he is on warfarin and sotalol      Atrial flutter (H)      Mcgarry's esophagus 2012    per note of Dr. Tavo Mike of [facility]     Bilateral lower leg cellulitis 2018     BPH (benign prostatic hyperplasia)      Candidiasis of perineum 2018     Carotid stenosis      Carotid stenosis, asymptomatic, right      Cellulitis      CHF (congestive heart failure) (H)      Cholecystitis, acute 2019     Chronic systolic heart failure (H)      Chronic venous stasis dermatitis      Closed nondisplaced intertrochanteric fracture of left femur with routine healing, subsequent encounter 2022     Clostridium difficile colitis      COPD (chronic obstructive pulmonary disease) (H)      Coronary artery disease due to lipid rich plaque     CABG x2     Coronary atherosclerosis      Diabetes (H)      Diabetic ulcer of both feet (H) 10/31/2017     Dyslexia      Dyslipidemia, goal LDL below 70      Epistaxis      Essential hypertension      Gangrene of left foot (H)      GERD (gastroesophageal reflux disease)      HLD (hyperlipidemia)      HTN (hypertension)      Hyponatremia      Ischemic heart disease      Lymphedema      Mild cognitive impairment      MRSA (methicillin resistant Staphylococcus aureus)      Neuropathy      Non-STEMI (non-ST elevated myocardial infarction) (H)      Occlusion and stenosis of right carotid artery      Osteoarthrosis      Osteomyelitis of ankle and foot (H)      Other atopic dermatitis      PAD (peripheral artery disease) (H)      Peripheral vascular disease (H)       Pneumonia 06/26/2018     Polyneuropathy due to type 2 diabetes mellitus (H)      Pressure ulcer, heel     Bilateral     Renal insufficiency      Type 2 diabetes mellitus, without long-term current use of insulin (H)      Ulcer of right foot (H)      Unable to function independently 11/13/2017      Past Surgical History:   Procedure Laterality Date     AMPUTATE FOOT Left 11/05/2017    Procedure: LEFT TRANSMETATARSAL AMPUTATION;  Surgeon: Ever Wick MD;  Location: [facility];  Service:      AMPUTATE FOOT Right 4/27/2023    Procedure: Transmetatarsal amputation right foot;  Surgeon: Miguelangel Spears DPM;  Location: [facility]     AMPUTATE FOOT Right 5/15/2023    Procedure: partial calcanectomy;  Surgeon: Miguelangel Spears DPM;  Location: [facility]     BYPASS GRAFT ARTERY CORONARY N/A 03/06/2000    SVG to OM1, SVG to PDA     BYPASS GRAFT ARTERY CORONARY N/A 10/04/2018    redo CABG due to graft failure     CABG MEASURES GRP       CARDIOVERSION  08/25/2011     CV CORONARY ANGIOGRAM N/A 10/01/2018    Procedure: Coronary Angiogram;  Surgeon: Miki Mac MD;  Location: [facility];  Service:      INCISION AND DRAINAGE FOOT, COMBINED Right 5/15/2023    Procedure: INCISION AND DRAINAGE, right heel with,;  Surgeon: Miguelangel Spears DPM;  Location: [facility]     INGUINAL HERNIA REPAIR Bilateral 1969    and 1979     IR EXTREMITY ANGIOGRAM BILATERAL  11/3/2017     IR LOWER EXTREMITY ANGIOGRAM LEFT  3/10/2023     IR LOWER EXTREMITY ANGIOGRAM RIGHT  1/24/2023     LAPAROSCOPIC CHOLECYSTECTOMY N/A 08/18/2019    Procedure: CHOLECYSTECTOMY, LAPAROSCOPIC;  Surgeon: Stewart Fountain MD;  Location: [facility];  Service: General     LENGTHEN TENDON ACHILLES Right 4/27/2023    Procedure: with Achilles tendon lengthening, debridement of right heel ulceration.;  Surgeon: Miguelangel Spears DPM;  Location: [facility]      Allergies   Allergen Reactions      Cranberry Extract Itching and Rash     Doxycycline Rash     Latex Rash     Penicillin V Rash     Reaction occurred as a child. Patient tolerated Zosyn 2018, Cefazolin 10/2018, and has also tolerated Augmentin.     Penicillins Rash     Reaction occurred as a child. Patient tolerated Zosyn 2018, Cefazolin 10/2018, and has also tolerated Augmentin.      Social History     Tobacco Use     Smoking status: Former     Packs/day: 1.00     Years: 36.00     Pack years: 36.00     Types: Cigarettes     Start date: 1964     Quit date: 2000     Years since quittin.1     Smokeless tobacco: Never   Vaping Use     Vaping status: Not on file   Substance Use Topics     Alcohol use: Not Currently     Alcohol/week: 5.0 standard drinks of alcohol     Types: 1 Cans of beer, 4 Standard drinks or equivalent per week      Wt Readings from Last 1 Encounters:   23 79.8 kg (175 lb 14.4 oz)        Anesthesia Evaluation   Pt has had prior anesthetic.     No history of anesthetic complications       ROS/MED HX  ENT/Pulmonary:     (+) COPD,  (-) recent URI   Neurologic:     (+) peripheral neuropathy,     Cardiovascular:     (+) Dyslipidemia hypertension-Peripheral Vascular Disease-- Carotid Stenosis and Other. CAD -past MI CABG--CHF Last EF: 60  (-) angina and angina   METS/Exercise Tolerance: >4 METS    Hematologic:  - neg hematologic  ROS     Musculoskeletal:   (+) arthritis,     GI/Hepatic:     (+) GERD, Asymptomatic on medication, esophageal disease,     Renal/Genitourinary:     (+) renal disease, type: CRI, BPH,     Endo:     (+) type II DM, Using insulin,  (-) Type I DM   Psychiatric/Substance Use:  - neg psychiatric ROS     Infectious Disease:     (+) MRSA,     Malignancy:  - neg malignancy ROS     Other:  - neg other ROS          Physical Exam    Airway  airway exam normal      Mallampati: II   TM distance: > 3 FB   Neck ROM: full   Mouth opening: > 3 cm    Respiratory Devices and Support         Dental       (+)  Multiple visibly decayed, broken teeth      Cardiovascular   cardiovascular exam normal       Rhythm and rate: regular and normal     Pulmonary   pulmonary exam normal                OUTSIDE LABS:  CBC:   Lab Results   Component Value Date    WBC 6.8 05/31/2023    WBC 9.4 05/26/2023    HGB 9.2 (L) 05/31/2023    HGB 9.4 (L) 05/26/2023    HCT 29.8 (L) 05/31/2023    HCT 30.9 (L) 05/26/2023     05/31/2023     05/26/2023     BMP:   Lab Results   Component Value Date     05/26/2023     05/24/2023    POTASSIUM 4.3 05/26/2023    POTASSIUM 5.1 05/24/2023    CHLORIDE 102 05/26/2023    CHLORIDE 100 05/24/2023    CO2 26 05/26/2023    CO2 22 05/24/2023    BUN 20.2 05/26/2023    BUN 28.4 (H) 05/24/2023    CR 1.21 (H) 05/26/2023    CR 1.27 (H) 05/24/2023     (H) 06/01/2023     (H) 05/26/2023     COAGS:   Lab Results   Component Value Date    PTT 36 01/24/2023    INR 1.23 (H) 01/24/2023    FIBR 322 10/04/2018     POC:   Lab Results   Component Value Date     (H) 06/30/2018     HEPATIC:   Lab Results   Component Value Date    ALBUMIN 3.4 (L) 05/26/2023    PROTTOTAL 7.4 05/26/2023    ALT 5 (L) 05/26/2023    AST 8 (L) 05/26/2023    ALKPHOS 83 05/26/2023    BILITOTAL 0.3 05/26/2023     OTHER:   Lab Results   Component Value Date    PH 7.32 (L) 10/05/2018    LACT 1.6 01/17/2023    A1C 6.6 (H) 05/24/2023    MELODY 9.1 05/26/2023    PHOS 2.4 (L) 08/19/2019    MAG 1.9 08/19/2019    LIPASE 35 08/16/2019    TSH 3.20 07/12/2022    CRP 4.0 (H) 01/03/2019    SED 48 (H) 01/17/2023       Anesthesia Plan    ASA Status:  4   NPO Status:  NPO Appropriate    Anesthesia Type: General.     - Airway: Mask Only   Induction: Propofol.   Maintenance: TIVA.        Consents    Anesthesia Plan(s) and associated risks, benefits, and realistic alternatives discussed. Questions answered and patient/representative(s) expressed understanding.    - Discussed:     - Discussed with:  Patient      - Extended  Intubation/Ventilatory Support Discussed: No.      - Patient is DNR/DNI Status: No    Use of blood products discussed: No .     Postoperative Care    Pain management: Peripheral nerve block (Single Shot).        Comments:    Other Comments: Nerve blocks for post op pain control per surgeon request.                Conrad Mirza MD

## 2023-06-01 NOTE — ANESTHESIA PROCEDURE NOTES
Sciatic Procedure Note    Pre-Procedure   Staff -        Anesthesiologist:  Conrad Mirza MD       Performed By: anesthesiologist       Location: pre-op       Procedure Start/Stop Times: 6/1/2023 8:44 AM and 6/1/2023 8:47 AM       Pre-Anesthestic Checklist: patient identified, IV checked, site marked, risks and benefits discussed, informed consent, monitors and equipment checked, pre-op evaluation, at physician/surgeon's request and post-op pain management  Timeout:       Correct Patient: Yes        Correct Procedure: Yes        Correct Site: Yes        Correct Position: Yes        Correct Laterality: Yes        Site Marked: Yes  Procedure Documentation  Procedure: Sciatic       Diagnosis: R FOOT INFECTION       Laterality: right       Patient Position: supine       Patient Prep/Sterile Barriers: sterile gloves, mask       Skin prep: Chloraprep (popliteal approach).       Needle Type: short bevel and insulated       Needle Gauge: 20.        Needle Length (Inches): 4        Ultrasound guided       1. Ultrasound was used to identify targeted nerve, plexus, vascular marker, or fascial plane and place a needle adjacent to it in real-time.       2. Ultrasound was used to visualize the spread of anesthetic in close proximity to the above referenced structure.       3. A permanent image is entered into the patient's record.       4. The visualized anatomic structures appeared normal.       5. There were no apparent abnormal pathologic findings.    Assessment/Narrative         The placement was negative for: blood aspirated, painful injection and site bleeding       Paresthesias: No.       Bolus given via needle..        Secured via.        Insertion/Infusion Method: Single Shot       Complications: none    Medication(s) Administered   Bupivacaine 0.5% PF (Infiltration) - Infiltration   20 mL - 6/1/2023 8:44:00 AM  Mepivacaine 2% PF (Perineural) - Perineural   5 mL - 6/1/2023 8:44:00 AM  Medication Administration  "Time: 6/1/2023 8:44 AM      FOR [facility] ONLY:   Pain Team Contact information: please page the Pain Team Via [employer]. Search \"Pain\". During daytime hours, please page the attending first. At night please page the resident first.      "

## 2023-06-02 ENCOUNTER — TELEPHONE (OUTPATIENT)
Dept: INFECTIOUS DISEASES | Facility: CLINIC | Age: 78
End: 2023-06-02

## 2023-06-02 LAB
ALBUMIN SERPL BCG-MCNC: 3.3 G/DL (ref 3.5–5.2)
ALP SERPL-CCNC: 71 U/L (ref 40–129)
ALT SERPL W P-5'-P-CCNC: <5 U/L (ref 10–50)
ANION GAP SERPL CALCULATED.3IONS-SCNC: 9 MMOL/L (ref 7–15)
AST SERPL W P-5'-P-CCNC: 10 U/L (ref 10–50)
BASOPHILS # BLD AUTO: 0.1 10E3/UL (ref 0–0.2)
BASOPHILS NFR BLD AUTO: 1 %
BILIRUB SERPL-MCNC: 0.3 MG/DL
BUN SERPL-MCNC: 18 MG/DL (ref 8–23)
CALCIUM SERPL-MCNC: 9.1 MG/DL (ref 8.8–10.2)
CHLORIDE SERPL-SCNC: 104 MMOL/L (ref 98–107)
CREAT SERPL-MCNC: 1.2 MG/DL (ref 0.67–1.17)
CRP SERPL-MCNC: 22.51 MG/L
DEPRECATED HCO3 PLAS-SCNC: 26 MMOL/L (ref 22–29)
EOSINOPHIL # BLD AUTO: 0.5 10E3/UL (ref 0–0.7)
EOSINOPHIL NFR BLD AUTO: 7 %
ERYTHROCYTE [DISTWIDTH] IN BLOOD BY AUTOMATED COUNT: 15.7 % (ref 10–15)
GFR SERPL CREATININE-BSD FRML MDRD: 62 ML/MIN/1.73M2
GLUCOSE SERPL-MCNC: 133 MG/DL (ref 70–99)
HCT VFR BLD AUTO: 29 % (ref 40–53)
HGB BLD-MCNC: 9 G/DL (ref 13.3–17.7)
IMM GRANULOCYTES # BLD: 0 10E3/UL
IMM GRANULOCYTES NFR BLD: 1 %
LYMPHOCYTES # BLD AUTO: 1 10E3/UL (ref 0.8–5.3)
LYMPHOCYTES NFR BLD AUTO: 13 %
MCH RBC QN AUTO: 25.9 PG (ref 26.5–33)
MCHC RBC AUTO-ENTMCNC: 31 G/DL (ref 31.5–36.5)
MCV RBC AUTO: 83 FL (ref 78–100)
MONOCYTES # BLD AUTO: 0.5 10E3/UL (ref 0–1.3)
MONOCYTES NFR BLD AUTO: 6 %
NEUTROPHILS # BLD AUTO: 5.7 10E3/UL (ref 1.6–8.3)
NEUTROPHILS NFR BLD AUTO: 72 %
NRBC # BLD AUTO: 0 10E3/UL
NRBC BLD AUTO-RTO: 0 /100
PATH REPORT.COMMENTS IMP SPEC: NORMAL
PATH REPORT.COMMENTS IMP SPEC: NORMAL
PATH REPORT.FINAL DX SPEC: NORMAL
PATH REPORT.GROSS SPEC: NORMAL
PATH REPORT.MICROSCOPIC SPEC OTHER STN: NORMAL
PATH REPORT.RELEVANT HX SPEC: NORMAL
PHOTO IMAGE: NORMAL
PLATELET # BLD AUTO: 238 10E3/UL (ref 150–450)
POTASSIUM SERPL-SCNC: 4.3 MMOL/L (ref 3.4–5.3)
PROT SERPL-MCNC: 6.7 G/DL (ref 6.4–8.3)
RBC # BLD AUTO: 3.48 10E6/UL (ref 4.4–5.9)
SODIUM SERPL-SCNC: 139 MMOL/L (ref 136–145)
WBC # BLD AUTO: 7.8 10E3/UL (ref 4–11)

## 2023-06-02 PROCEDURE — 88311 DECALCIFY TISSUE: CPT | Mod: 26 | Performed by: PATHOLOGY

## 2023-06-02 PROCEDURE — 86140 C-REACTIVE PROTEIN: CPT | Mod: ORL | Performed by: NURSE PRACTITIONER

## 2023-06-02 PROCEDURE — 80053 COMPREHEN METABOLIC PANEL: CPT | Mod: ORL | Performed by: NURSE PRACTITIONER

## 2023-06-02 PROCEDURE — 88305 TISSUE EXAM BY PATHOLOGIST: CPT | Mod: 26 | Performed by: PATHOLOGY

## 2023-06-02 PROCEDURE — 85025 COMPLETE CBC W/AUTO DIFF WBC: CPT | Mod: ORL | Performed by: NURSE PRACTITIONER

## 2023-06-02 NOTE — TELEPHONE ENCOUNTER
Fax abdiel neely/orin   [phone]    Pt's sister would like lab result notes faxed to [facility].     Faxed results notes to number provided.

## 2023-06-05 LAB
BACTERIA TISS BX CULT: ABNORMAL
BACTERIA TISS BX CULT: ABNORMAL

## 2023-06-06 ENCOUNTER — TRANSITIONAL CARE UNIT VISIT (OUTPATIENT)
Dept: GERIATRICS | Facility: CLINIC | Age: 78
End: 2023-06-06
Payer: MEDICARE

## 2023-06-06 VITALS
TEMPERATURE: 96 F | BODY MASS INDEX: 25.64 KG/M2 | WEIGHT: 179.1 LBS | HEART RATE: 62 BPM | HEIGHT: 70 IN | RESPIRATION RATE: 18 BRPM | OXYGEN SATURATION: 95 % | SYSTOLIC BLOOD PRESSURE: 138 MMHG | DIASTOLIC BLOOD PRESSURE: 63 MMHG

## 2023-06-06 DIAGNOSIS — Z79.4 TYPE 2 DIABETES MELLITUS WITH OTHER CIRCULATORY COMPLICATION, WITH LONG-TERM CURRENT USE OF INSULIN (H): ICD-10-CM

## 2023-06-06 DIAGNOSIS — Z98.890 S/P FOOT SURGERY, RIGHT: Primary | ICD-10-CM

## 2023-06-06 DIAGNOSIS — E11.42 POLYNEUROPATHY DUE TO TYPE 2 DIABETES MELLITUS (H): ICD-10-CM

## 2023-06-06 DIAGNOSIS — Z46.89 ENCOUNTER FOR MANAGEMENT OF WOUND VAC: ICD-10-CM

## 2023-06-06 DIAGNOSIS — E11.59 TYPE 2 DIABETES MELLITUS WITH OTHER CIRCULATORY COMPLICATION, WITH LONG-TERM CURRENT USE OF INSULIN (H): ICD-10-CM

## 2023-06-06 DIAGNOSIS — M86.9 OSTEOMYELITIS OF RIGHT FOOT, UNSPECIFIED TYPE (H): ICD-10-CM

## 2023-06-06 DIAGNOSIS — J44.9 CHRONIC OBSTRUCTIVE PULMONARY DISEASE, UNSPECIFIED COPD TYPE (H): ICD-10-CM

## 2023-06-06 PROCEDURE — 99309 SBSQ NF CARE MODERATE MDM 30: CPT | Performed by: NURSE PRACTITIONER

## 2023-06-06 NOTE — LETTER
6/6/2023        RE: Ever Crane  [address]  [address]  [facility] [address]        [facility] Follow Up  PCP & CLINIC: [facility], [address] [facility] [address], [address] [address]  Chief Complaint   Patient presents with     Hospital F/U   [place] MRN: [id number]. Place of Service where encounter took place:  [facility] (TCU) [[id number]] Ever Crane  is a 78 year old  (1945), admitted to the above facility from  [facility]. Hospital stay 6/1 through 6/1. Admitted to this facility for  rehab, medical management, and nursing care. HPI information obtained from: facility chart records, facility staff, patient report, [facility] chart review, and Care Everywhere [facility] chart review.     Brief Summary of Hospital Course: Ever presented to [facility] on 6/1 for a planned I&D of right foot w/ debridement of ulceration of bilateral heels. He also had an Exostectomy of the first metatarsal of the right foot. Minimal blood loss. Wound vac was applied, abx per ID and Ever will follow up w/ surgery team in 2 weeks.     Updates since return to transitional care unit: Ever returned to TCU on 6/1 (same day) s/p the above hospitalization. Today, Ever says he feels pretty good.  The previous right foot pain he had is now gone and he denies any other pain. He denies headaches, dizziness, fever. He remains on IV Zosyn per ID. Her denies CP, SOB, cough. He denies constipation/diarrhea. His appetite is good and he denies sleep issues. He will follow up w/ his surgeon later this week.    CODE STATUS/ADVANCE DIRECTIVES DISCUSSION: CPR/Full code . Patient's living condition: lives alone. ALLERGIES: Cranberry extract, Doxycycline, Latex, Penicillin v, and Penicillins PAST MEDICAL HISTORY:  has a past medical history of A-fib (H), MESHA (acute kidney injury) (H), Anemia, Atopic keratoconjunctivitis, Atrial fibrillation (H), Atrial flutter (H), Mcgarry's  esophagus (01/01/2012), Bilateral lower leg cellulitis (08/31/2018), BPH (benign prostatic hyperplasia), Candidiasis of perineum (01/03/2018), Carotid stenosis, Carotid stenosis, asymptomatic, right, Cellulitis, CHF (congestive heart failure) (H), Cholecystitis, acute (08/18/2019), Chronic systolic heart failure (H), Chronic venous stasis dermatitis, Closed nondisplaced intertrochanteric fracture of left femur with routine healing, subsequent encounter (05/18/2022), Clostridium difficile colitis, COPD (chronic obstructive pulmonary disease) (H), Coronary artery disease due to lipid rich plaque (2000), Coronary atherosclerosis, Diabetes (H), Diabetic ulcer of both feet (H) (10/31/2017), Dyslexia, Dyslipidemia, goal LDL below 70 (2000), Epistaxis, Essential hypertension, Gangrene of left foot (H), GERD (gastroesophageal reflux disease), HLD (hyperlipidemia), HTN (hypertension), Hyponatremia, Ischemic heart disease, Lymphedema, Mild cognitive impairment, MRSA (methicillin resistant Staphylococcus aureus), Neuropathy, Non-STEMI (non-ST elevated myocardial infarction) (H), Occlusion and stenosis of right carotid artery, Osteoarthrosis, Osteomyelitis of ankle and foot (H), Other atopic dermatitis, PAD (peripheral artery disease) (H), Peripheral vascular disease (H), Pneumonia (06/26/2018), Polyneuropathy due to type 2 diabetes mellitus (H), Pressure ulcer, heel, Renal insufficiency, Type 2 diabetes mellitus, without long-term current use of insulin (H), Ulcer of right foot (H), and Unable to function independently (11/13/2017).    He has no past medical history of Complication of anesthesia or Malignant hyperthermia.. PAST SURGICAL HISTORY:   has a past surgical history that includes cabg measures grp; IR Extremity Angiogram Bilateral (11/3/2017); Bypass graft artery coronary (N/A, 03/06/2000); Cardioversion (08/25/2011); Amputate foot (Left, 11/05/2017); Inguinal Hernia Repair (Bilateral, 1969); Cv Coronary Angiogram  (N/A, 10/01/2018); Laparoscopic cholecystectomy (N/A, 08/18/2019); Bypass graft artery coronary (N/A, 10/04/2018); IR Lower Extremity Angiogram Right (1/24/2023); IR Lower Extremity Angiogram Left (3/10/2023); Amputate foot (Right, 4/27/2023); Lengthen tendon achilles (Right, 4/27/2023); Incision and drainage foot, combined (Right, 5/15/2023); Amputate foot (Right, 5/15/2023); and Incision and drainage foot, combined (Bilateral, 6/1/2023).. FAMILY HISTORY: family history includes CABG in his father; Esophageal Cancer (age of onset: 58.00) in his father; No Known Problems in his grandchild, grandchild, sister, sister, and son; Obesity in his sister; Osteoarthritis in his sister; Sudden Death (age of onset: 85.00) in his mother; Valvular heart disease in his father.. SOCIAL HISTORY:   reports that he quit smoking about 23 years ago. His smoking use included cigarettes. He started smoking about 59 years ago. He has a 36.00 pack-year smoking history. He has never used smokeless tobacco. He reports that he does not currently use alcohol after a past usage of about 5.0 standard drinks of alcohol per week. He reports that he does not use drugs.  Post Discharge Medication Reconciliation Status: discharge medications reconciled and changed, per note/orders.  Current Outpatient Medications   Medication Sig Dispense Refill     acetaminophen (TYLENOL) 325 MG tablet 1000 mg tid scheduled and 500 mg bid prn       albuterol (PROAIR HFA/PROVENTIL HFA/VENTOLIN HFA) 108 (90 Base) MCG/ACT inhaler Inhale 2 puffs into the lungs 2 times daily And q4h PRN       cetirizine (ZYRTEC) 10 MG tablet Take 5 mg by mouth daily       clopidogrel (PLAVIX) 75 MG tablet Take 1 tablet (75 mg) by mouth daily Start taking medication the day after the procedure. 90 tablet 1     dulaglutide (TRULICITY) 0.75 MG/0.5ML pen Inject 0.75 mg Subcutaneous every 7 days       furosemide (LASIX) 40 MG tablet Take 40 mg by mouth daily       gabapentin (NEURONTIN) 100  "MG capsule Take 200 mg by mouth 2 times daily       glipiZIDE (GLUCOTROL) 10 MG tablet Take 2.5 mg by mouth 2 times daily (before meals)       ketoconazole (NIZORAL) 2 % external shampoo Apply topically twice a week Mon and Fri.       losartan (COZAAR) 25 MG tablet Take 12.5 mg by mouth daily       mineral oil-hydrophilic petrolatum (AQUAPHOR) external ointment Apply topically 2 times daily       omeprazole (PRILOSEC) 20 MG CR capsule Take 20 mg by mouth daily       oxyCODONE (ROXICODONE) 5 MG tablet Take 1 tablet (5 mg) by mouth every 6 hours as needed for moderate to severe pain 12 tablet 0     oxyCODONE (ROXICODONE) 5 MG tablet Take 1-2 tablets (5-10 mg) by mouth every 6 hours as needed for moderate to severe pain 12 tablet 0     piperacillin-tazobactam (ZOSYN) 3-0.375 GM vial Inject 3.375 g into the vein every 8 hours for 42 days 4 hour infusion 1890 mL 0     polyethylene glycol (MIRALAX) 17 g packet Take 1 packet by mouth daily       rivaroxaban ANTICOAGULANT (XARELTO) 15 MG TABS tablet Take by mouth daily (with dinner)       senna-docusate (SENOKOT-S/PERICOLACE) 8.6-50 MG tablet Take 1 tablet by mouth 2 times daily And BID PRN       simvastatin (ZOCOR) 40 MG tablet Take 40 mg by mouth At Bedtime       spironolactone (ALDACTONE) 25 MG tablet Take 25 mg by mouth daily       tamsulosin (FLOMAX) 0.4 MG capsule Take 0.4 mg by mouth daily       ROS: Limited secondary to cognitive impairment but today pt reports the above and 10 point ROS of systems including Constitutional, Eyes, Respiratory, Cardiovascular, Gastroenterology, Genitourinary, Integumentary, Musculoskeletal, Psychiatric were all negative except for pertinent positives noted in my HPI.    Vitals: /63   Pulse 62   Temp (!) 96  F (35.6  C)   Resp 18   Ht 1.778 m (5' 10\")   Wt 81.2 kg (179 lb 1.6 oz)   SpO2 95%   BMI 25.70 kg/m     BGs:    Exam:  GENERAL APPEARANCE: Alert, in no distress, cooperative.   ENT: Mouth/posterior oropharynx intact " w/ moist mucous membranes, hearing acuity [place].  EYES: EOM, conjunctivae, lids, pupils and irises normal, PERRL2.   RESP: Respiratory effort good, no respiratory distress, Lung sounds clear. On RA.   CV: Auscultation of heart reveals S1, S2, rate controlled and rhythm irregular, no murmur, no rub or gallop, Edema 0+ BLE. Peripheral pulses are 2+.  ABDOMEN: Normal bowel sounds, soft, non-tender abdomen, and no masses palpated.  SKIN: Inspection/Palpation of skin and subcutaneous tissue baseline w/ fragility. No wounds/rashes noted except BLE wounds. Right surgical wound is covered w/ vac and Left covered w/ kerlix.   NEURO: CN II-XII at patient's baseline, sensation baseline PPS.  PSYCH: Insight, judgement, and memory are impaired at baseline, affect and mood are happy/engaged.    Lab/Diagnostic data: Recent labs in [facility] reviewed by me today.     ASSESSMENT/PLAN:  S/P foot surgery, right  Osteomyelitis of right foot, unspecified type (H)  Encounter for management of wound VAC  Type 2 diabetes mellitus with other circulatory complication, with long-term current use of insulin (H)  Polyneuropathy due to type 2 diabetes mellitus (H)  Chronic obstructive pulmonary disease, unspecified COPD type (H)  Acute on chronic. Ongoing.    Noting scheduled Albuterol for some reason. No SOB, wheezing, but note underlying COPD. Change MDI to PRN.     Not seasonal allergy sxs. Will change Zyrtec to PRN.    No skin concerns besides surgical sites. No use of PRN Triamcinolone. Will discontinue.     Surgeon team recommending knee scooter, PT/OT to eval.     Will obtain post-op labs.     Ever will follow up w/ surgeon on 6/8.   Follow up w/in 1 week or as needed.    Orders:  1. Change Albuterol MDI to BID PRN. Dx: COPD.  2. Change Zyrtec to 5mg PO qday PRN. DX: seasonal allergies.  3. Discontinue Triamcinolone.   4. Recheck CBC, BMP x1 on 6/12. Dx: osteomyelitis.   5. PT/OT eval/tx x1. Dx: mobility device.     Electronically signed  by:  Dr. Yanna Dozier, APRN CNP DNP                          Sincerely,        KEVIN Bansal CNP

## 2023-06-06 NOTE — PROGRESS NOTES
[facility] Follow Up  PCP & CLINIC: [facility], [address] [place] [address], [place] [id number] / [place] [phone]  Chief Complaint   Patient presents with     Hospital F/U   [place] MRN: [id number]. Place of Service where encounter took place:  [facility] [4002] Ever Crane  is a 78 year old  (1945), admitted to the above facility from  [facility]. Hospital stay 6/1 through 6/1. Admitted to this facility for  rehab, medical management, and nursing care. HPI information obtained from: facility chart records, facility staff, patient report, [facility] chart review, and Care Everywhere [facility] chart review.     Brief Summary of Hospital Course: Ever presented to [facility] on 6/1 for a planned I&D of right foot w/ debridement of ulceration of bilateral heels. He also had an Exostectomy of the first metatarsal of the right foot. Minimal blood loss. Wound vac was applied, abx per ID and Ever will follow up w/ surgery team in 2 weeks.     Updates since return to transitional care unit: Eevr returned to U on 6/1 (same day) s/p the above hospitalization. Today, Ever says he feels pretty good.  The previous right foot pain he had is now gone and he denies any other pain. He denies headaches, dizziness, fever. He remains on IV Zosyn per ID. Her denies CP, SOB, cough. He denies constipation/diarrhea. His appetite is good and he denies sleep issues. He will follow up w/ his surgeon later this week.    CODE STATUS/ADVANCE DIRECTIVES DISCUSSION: CPR/Full code . Patient's living condition: lives alone. ALLERGIES: Cranberry extract, Doxycycline, Latex, Penicillin v, and Penicillins PAST MEDICAL HISTORY:  has a past medical history of A-fib (H), MESHA (acute kidney injury) (H), Anemia, Atopic keratoconjunctivitis, Atrial fibrillation (H), Atrial flutter (H), Mcgarry's esophagus (01/01/2012), Bilateral lower leg cellulitis (08/31/2018), BPH (benign prostatic  hyperplasia), Candidiasis of perineum (01/03/2018), Carotid stenosis, Carotid stenosis, asymptomatic, right, Cellulitis, CHF (congestive heart failure) (H), Cholecystitis, acute (08/18/2019), Chronic systolic heart failure (H), Chronic venous stasis dermatitis, Closed nondisplaced intertrochanteric fracture of left femur with routine healing, subsequent encounter (05/18/2022), Clostridium difficile colitis, COPD (chronic obstructive pulmonary disease) (H), Coronary artery disease due to lipid rich plaque (2000), Coronary atherosclerosis, Diabetes (H), Diabetic ulcer of both feet (H) (10/31/2017), Dyslexia, Dyslipidemia, goal LDL below 70 (2000), Epistaxis, Essential hypertension, Gangrene of left foot (H), GERD (gastroesophageal reflux disease), HLD (hyperlipidemia), HTN (hypertension), Hyponatremia, Ischemic heart disease, Lymphedema, Mild cognitive impairment, MRSA (methicillin resistant Staphylococcus aureus), Neuropathy, Non-STEMI (non-ST elevated myocardial infarction) (H), Occlusion and stenosis of right carotid artery, Osteoarthrosis, Osteomyelitis of ankle and foot (H), Other atopic dermatitis, PAD (peripheral artery disease) (H), Peripheral vascular disease (H), Pneumonia (06/26/2018), Polyneuropathy due to type 2 diabetes mellitus (H), Pressure ulcer, heel, Renal insufficiency, Type 2 diabetes mellitus, without long-term current use of insulin (H), Ulcer of right foot (H), and Unable to function independently (11/13/2017).    He has no past medical history of Complication of anesthesia or Malignant hyperthermia.. PAST SURGICAL HISTORY:   has a past surgical history that includes cabg measures grp; IR Extremity Angiogram Bilateral (11/3/2017); Bypass graft artery coronary (N/A, 03/06/2000); Cardioversion (08/25/2011); Amputate foot (Left, 11/05/2017); Inguinal Hernia Repair (Bilateral, 1969); Cv Coronary Angiogram (N/A, 10/01/2018); Laparoscopic cholecystectomy (N/A, 08/18/2019); Bypass graft artery  coronary (N/A, 10/04/2018); IR Lower Extremity Angiogram Right (1/24/2023); IR Lower Extremity Angiogram Left (3/10/2023); Amputate foot (Right, 4/27/2023); Lengthen tendon achilles (Right, 4/27/2023); Incision and drainage foot, combined (Right, 5/15/2023); Amputate foot (Right, 5/15/2023); and Incision and drainage foot, combined (Bilateral, 6/1/2023).. FAMILY HISTORY: family history includes CABG in his father; Esophageal Cancer (age of onset: 58.00) in his father; No Known Problems in his grandchild, grandchild, sister, sister, and son; Obesity in his sister; Osteoarthritis in his sister; Sudden Death (age of onset: 85.00) in his mother; Valvular heart disease in his father.. SOCIAL HISTORY:   reports that he quit smoking about 23 years ago. His smoking use included cigarettes. He started smoking about 59 years ago. He has a 36.00 pack-year smoking history. He has never used smokeless tobacco. He reports that he does not currently use alcohol after a past usage of about 5.0 standard drinks of alcohol per week. He reports that he does not use drugs.  Post Discharge Medication Reconciliation Status: discharge medications reconciled and changed, per note/orders.  Current Outpatient Medications   Medication Sig Dispense Refill     acetaminophen (TYLENOL) 325 MG tablet 1000 mg tid scheduled and 500 mg bid prn       albuterol (PROAIR HFA/PROVENTIL HFA/VENTOLIN HFA) 108 (90 Base) MCG/ACT inhaler Inhale 2 puffs into the lungs 2 times daily And q4h PRN       cetirizine (ZYRTEC) 10 MG tablet Take 5 mg by mouth daily       clopidogrel (PLAVIX) 75 MG tablet Take 1 tablet (75 mg) by mouth daily Start taking medication the day after the procedure. 90 tablet 1     dulaglutide (TRULICITY) 0.75 MG/0.5ML pen Inject 0.75 mg Subcutaneous every 7 days       furosemide (LASIX) 40 MG tablet Take 40 mg by mouth daily       gabapentin (NEURONTIN) 100 MG capsule Take 200 mg by mouth 2 times daily       glipiZIDE (GLUCOTROL) 10 MG tablet  "Take 2.5 mg by mouth 2 times daily (before meals)       ketoconazole (NIZORAL) 2 % external shampoo Apply topically twice a week Mon and Fri.       losartan (COZAAR) 25 MG tablet Take 12.5 mg by mouth daily       mineral oil-hydrophilic petrolatum (AQUAPHOR) external ointment Apply topically 2 times daily       omeprazole (PRILOSEC) 20 MG CR capsule Take 20 mg by mouth daily       oxyCODONE (ROXICODONE) 5 MG tablet Take 1 tablet (5 mg) by mouth every 6 hours as needed for moderate to severe pain 12 tablet 0     oxyCODONE (ROXICODONE) 5 MG tablet Take 1-2 tablets (5-10 mg) by mouth every 6 hours as needed for moderate to severe pain 12 tablet 0     piperacillin-tazobactam (ZOSYN) 3-0.375 GM vial Inject 3.375 g into the vein every 8 hours for 42 days 4 hour infusion 1890 mL 0     polyethylene glycol (MIRALAX) 17 g packet Take 1 packet by mouth daily       rivaroxaban ANTICOAGULANT (XARELTO) 15 MG TABS tablet Take by mouth daily (with dinner)       senna-docusate (SENOKOT-S/PERICOLACE) 8.6-50 MG tablet Take 1 tablet by mouth 2 times daily And BID PRN       simvastatin (ZOCOR) 40 MG tablet Take 40 mg by mouth At Bedtime       spironolactone (ALDACTONE) 25 MG tablet Take 25 mg by mouth daily       tamsulosin (FLOMAX) 0.4 MG capsule Take 0.4 mg by mouth daily       ROS: Limited secondary to cognitive impairment but today pt reports the above and 10 point ROS of systems including Constitutional, Eyes, Respiratory, Cardiovascular, Gastroenterology, Genitourinary, Integumentary, Musculoskeletal, Psychiatric were all negative except for pertinent positives noted in my HPI.    Vitals: /63   Pulse 62   Temp (!) 96  F (35.6  C)   Resp 18   Ht 1.778 m (5' 10\")   Wt 81.2 kg (179 lb 1.6 oz)   SpO2 95%   BMI 25.70 kg/m     BGs:    Exam:  GENERAL APPEARANCE: Alert, in no distress, cooperative.   ENT: Mouth/posterior oropharynx intact w/ moist mucous membranes, hearing acuity [place].  EYES: EOM, conjunctivae, lids, pupils " and irises normal, PERRL2.   RESP: Respiratory effort good, no respiratory distress, Lung sounds clear. On RA.   CV: Auscultation of heart reveals S1, S2, rate controlled and rhythm irregular, no murmur, no rub or gallop, Edema 0+ BLE. Peripheral pulses are 2+.  ABDOMEN: Normal bowel sounds, soft, non-tender abdomen, and no masses palpated.  SKIN: Inspection/Palpation of skin and subcutaneous tissue baseline w/ fragility. No wounds/rashes noted except BLE wounds. Right surgical wound is covered w/ vac and Left covered w/ kerlix.   NEURO: CN II-XII at patient's baseline, sensation baseline PPS.  PSYCH: Insight, judgement, and memory are impaired at baseline, affect and mood are happy/engaged.    Lab/Diagnostic data: [facility] labs in [facility] reviewed by me today.     ASSESSMENT/PLAN:  S/P foot surgery, right  Osteomyelitis of right foot, unspecified type (H)  Encounter for management of wound VAC  Type 2 diabetes mellitus with other circulatory complication, with long-term current use of insulin (H)  Polyneuropathy due to type 2 diabetes mellitus (H)  Chronic obstructive pulmonary disease, unspecified COPD type (H)  Acute on chronic. Ongoing.    Noting scheduled Albuterol for some reason. No SOB, wheezing, but note underlying COPD. Change MDI to PRN.     Not seasonal allergy sxs. Will change Zyrtec to PRN.    No skin concerns besides surgical sites. No use of PRN Triamcinolone. Will discontinue.     Surgeon team recommending knee scooter, PT/OT to eval.     Will obtain post-op labs.     Ever will follow up w/ surgeon on 6/8.   Follow up w/in 1 week or as needed.    Orders:  1. Change Albuterol MDI to BID PRN. Dx: COPD.  2. Change Zyrtec to 5mg PO qday PRN. DX: seasonal allergies.  3. Discontinue Triamcinolone.   4. Recheck CBC, BMP x1 on 6/12. Dx: osteomyelitis.   5. PT/OT eval/tx x1. Dx: mobility device.     Electronically signed by:  Dr. Yanna Dozier, APRN CNP DNP

## 2023-06-08 ENCOUNTER — LAB REQUISITION (OUTPATIENT)
Dept: LAB | Facility: CLINIC | Age: 78
End: 2023-06-08
Payer: MEDICARE

## 2023-06-08 ENCOUNTER — OFFICE VISIT (OUTPATIENT)
Dept: VASCULAR SURGERY | Facility: CLINIC | Age: 78
End: 2023-06-08
Attending: PODIATRIST
Payer: MEDICARE

## 2023-06-08 VITALS — HEART RATE: 55 BPM | OXYGEN SATURATION: 93 % | DIASTOLIC BLOOD PRESSURE: 60 MMHG | SYSTOLIC BLOOD PRESSURE: 124 MMHG

## 2023-06-08 DIAGNOSIS — L97.514 ULCER OF RIGHT FOOT WITH NECROSIS OF BONE (H): Primary | ICD-10-CM

## 2023-06-08 DIAGNOSIS — L89.613 PRESSURE ULCER OF RIGHT HEEL, STAGE 3 (H): ICD-10-CM

## 2023-06-08 DIAGNOSIS — L97.421 NON-PRESSURE CHRONIC ULCER OF LEFT HEEL AND MIDFOOT LIMITED TO BREAKDOWN OF SKIN (H): ICD-10-CM

## 2023-06-08 DIAGNOSIS — L97.421 ULCER OF LEFT HEEL AND MIDFOOT, LIMITED TO BREAKDOWN OF SKIN (H): ICD-10-CM

## 2023-06-08 LAB — BACTERIA TISS BX CULT: NORMAL

## 2023-06-08 PROCEDURE — 11044 DBRDMT BONE 1ST 20 SQ CM/<: CPT | Performed by: PODIATRIST

## 2023-06-08 PROCEDURE — 11047 DBRDMT BONE EACH ADDL: CPT | Performed by: PODIATRIST

## 2023-06-08 PROCEDURE — 11046 DBRDMT MUSC&/FSCA EA ADDL: CPT | Performed by: PODIATRIST

## 2023-06-08 PROCEDURE — 11043 DBRDMT MUSC&/FSCA 1ST 20/<: CPT | Performed by: PODIATRIST

## 2023-06-08 PROCEDURE — 11042 DBRDMT SUBQ TIS 1ST 20SQCM/<: CPT | Performed by: PODIATRIST

## 2023-06-08 ASSESSMENT — PAIN SCALES - GENERAL: PAINLEVEL: SEVERE PAIN (6)

## 2023-06-08 NOTE — PROGRESS NOTES
FOOT AND ANKLE SURGERY/PODIATRY Progress Note      ASSESSMENT:   Osteomyelitis calcaneus right   Ulceration right heel  Ulceration left heel        TREATMENT:  -I discussed with the patient and his sister today that the right foot ulceration and bilateral heel ulcerations have improved since recent surgical debridement.  There is a small area of exposed bone along the distal medial right foot with increased depth along the plantar skin flap and I recommend he continue with the wound VAC at this location and also the right heel at this time.  We will continue with endoform on the left heel ulceration.    -Continue with PRAFO boots bilateral feet, nonweightbearing right foot.    -Pathology report from 6/1 indicates the presence of osteomyelitis of the right midfoot.     -Antibiotics per ID.    -After discussion of risk factors, nursing staff removed dressing, cleansed wound and consent obtained 2% Lidocaine HCL jelly was applied, under clean conditions, the right foot right foot ulceration(s) were debrided using currette.  Devitalized and nonviable tissue, along with any fibrin and slough, was removed to improve granulation tissue formation, stimulate wound healing, decrease overall bacteria load, disrupt biofilm formation and decrease edge senescence. Wound drainage was scant No. Total excisional debridement was 105.6 sq cm into the bone with a depth of 1 cm.   Ulcers were improved afterwards and .  Measures were as noted on the flow sheet. A gauze dressing was applied.     -After discussion of risk factors, nursing staff removed dressing, cleansed wound and consent obtained 2% Lidocaine HCL jelly was applied, under clean conditions, the right heel ulceration(s) were debrided using currette.  Devitalized and nonviable tissue, along with any fibrin and slough, was removed to improve granulation tissue formation, stimulate wound healing, decrease overall bacteria load, disrupt biofilm formation and decrease edge  senescence. Wound drainage was scant No. Total excisional debridement was 33 sq cm into the muscle/fascia with a depth of 0.8 cm.   Ulcers were improved afterwards and .  Measures were as noted on the flow sheet. A gauze dressing was applied.     -After discussion of risk factors, nursing staff removed dressing, cleansed wound and consent obtained 2% Lidocaine HCL jelly was applied, under clean conditions, the left heel ulceration(s) were debrided using currette.  Devitalized and nonviable tissue, along with any fibrin and slough, was removed to improve granulation tissue formation, stimulate wound healing, decrease overall bacteria load, disrupt biofilm formation and decrease edge senescence. Wound drainage was scant No. Total excisional debridement was 16 sq cm into the subcutaneous tissue with a depth of 0.2 cm.   Ulcers were improved afterwards and .  Measures were as noted on the flow sheet. Collagen with a gauze dresssing was applied. He will continue to apply Collagen with a gauze dresssing as directed.      -He will follow-up in 2 weeks    Miguelangel Spears DPM  [facility] [place]      HPI: Ever Crane was seen again today for bilateral foot and heel ulcerations.  Since his last visit with me he has used a wound VAC as directed and try to remain nonweightbearing on his right foot.    Past Medical History:   Diagnosis Date     A-fib (H)      MESHA (acute kidney injury) (H)      Anemia      Atopic keratoconjunctivitis      Atrial fibrillation (H)     Brian Moe: 8/2011 Cardioversion; CHADS2 VASC = 5; he is on warfarin and sotalol      Atrial flutter (H)      Mcgarry's esophagus 01/01/2012    per note of Dr. Tavo Mike of [facility]     Bilateral lower leg cellulitis 08/31/2018     BPH (benign prostatic hyperplasia)      Candidiasis of perineum 01/03/2018     Carotid stenosis      Carotid stenosis, asymptomatic, right      Cellulitis      CHF (congestive heart failure) (H)       Cholecystitis, acute 08/18/2019     Chronic systolic heart failure (H)      Chronic venous stasis dermatitis      Closed nondisplaced intertrochanteric fracture of left femur with routine healing, subsequent encounter 05/18/2022     Clostridium difficile colitis      COPD (chronic obstructive pulmonary disease) (H)      Coronary artery disease due to lipid rich plaque 2000    CABG x2     Coronary atherosclerosis      Diabetes (H)      Diabetic ulcer of both feet (H) 10/31/2017     Dyslexia      Dyslipidemia, goal LDL below 70 2000     Epistaxis      Essential hypertension      Gangrene of left foot (H)      GERD (gastroesophageal reflux disease)      HLD (hyperlipidemia)      HTN (hypertension)      Hyponatremia      Ischemic heart disease      Lymphedema      Mild cognitive impairment      MRSA (methicillin resistant Staphylococcus aureus)      Neuropathy      Non-STEMI (non-ST elevated myocardial infarction) (H)      Occlusion and stenosis of right carotid artery      Osteoarthrosis      Osteomyelitis of ankle and foot (H)      Other atopic dermatitis      PAD (peripheral artery disease) (H)      Peripheral vascular disease (H)      Pneumonia 06/26/2018     Polyneuropathy due to type 2 diabetes mellitus (H)      Pressure ulcer, heel     Bilateral     Renal insufficiency      Type 2 diabetes mellitus, without long-term current use of insulin (H)      Ulcer of right foot (H)      Unable to function independently 11/13/2017       Past Surgical History:   Procedure Laterality Date     AMPUTATE FOOT Left 11/05/2017    Procedure: LEFT TRANSMETATARSAL AMPUTATION;  Surgeon: Ever Wick MD;  Location: [facility] [place];  Service:      AMPUTATE FOOT Right 4/27/2023    Procedure: Transmetatarsal amputation right foot;  Surgeon: Miguelangel Spears DPM;  Location: [facility] [place]     AMPUTATE FOOT Right 5/15/2023    Procedure: partial calcanectomy;  Surgeon: Miguelangel Spears DPM;  Location: [facility] [place] OR      BYPASS GRAFT ARTERY CORONARY N/A 03/06/2000    SVG to OM1, SVG to PDA     BYPASS GRAFT ARTERY CORONARY N/A 10/04/2018    redo CABG due to graft failure     CABG MEASURES GRP       CARDIOVERSION  08/25/2011     CV CORONARY ANGIOGRAM N/A 10/01/2018    Procedure: Coronary Angiogram;  Surgeon: Miki Mac MD;  Location: [facility];  Service:      INCISION AND DRAINAGE FOOT, COMBINED Right 5/15/2023    Procedure: INCISION AND DRAINAGE, right heel with,;  Surgeon: Miguelangel Spears DPM;  Location: [facility] [place] OR     INCISION AND DRAINAGE FOOT, COMBINED Bilateral 6/1/2023    Procedure: INCISION AND DRAINAGE, right foot with debridement of ulceration bilateral heels;  Surgeon: Miguelangel Spears DPM;  Location: [facility] [place] OR     INGUINAL HERNIA REPAIR Bilateral 1969    and 1979     IR EXTREMITY ANGIOGRAM BILATERAL  11/3/2017     IR LOWER EXTREMITY ANGIOGRAM LEFT  3/10/2023     IR LOWER EXTREMITY ANGIOGRAM RIGHT  1/24/2023     LAPAROSCOPIC CHOLECYSTECTOMY N/A 08/18/2019    Procedure: CHOLECYSTECTOMY, LAPAROSCOPIC;  Surgeon: Stewart Fountain MD;  Location: [facility] OR;  Service: General     LENGTHEN TENDON ACHILLES Right 4/27/2023    Procedure: with Achilles tendon lengthening, debridement of right heel ulceration.;  Surgeon: Miguelangel Spears DPM;  Location: [facility] [place]       Allergies   Allergen Reactions     Cranberry Extract Itching and Rash     Doxycycline Rash     Latex Rash     Penicillin V Rash     Reaction occurred as a child. Patient tolerated Zosyn 6/2018, Cefazolin 10/2018, and has also tolerated Augmentin.     Penicillins Rash     Reaction occurred as a child. Patient tolerated Zosyn 6/2018, Cefazolin 10/2018, and has also tolerated Augmentin.         Current Outpatient Medications:      acetaminophen (TYLENOL) 325 MG tablet, 1000 mg tid scheduled and 500 mg bid prn, Disp: , Rfl:      albuterol (PROAIR HFA/PROVENTIL HFA/VENTOLIN HFA) 108 (90 Base) MCG/ACT  inhaler, Inhale 2 puffs into the lungs 2 times daily as needed, Disp: , Rfl:      cetirizine (ZYRTEC) 10 MG tablet, Take 5 mg by mouth daily as needed, Disp: , Rfl:      clopidogrel (PLAVIX) 75 MG tablet, Take 1 tablet (75 mg) by mouth daily Start taking medication the day after the procedure., Disp: 90 tablet, Rfl: 1     dulaglutide (TRULICITY) 0.75 MG/0.5ML pen, Inject 0.75 mg Subcutaneous every 7 days, Disp: , Rfl:      furosemide (LASIX) 40 MG tablet, Take 40 mg by mouth daily, Disp: , Rfl:      gabapentin (NEURONTIN) 100 MG capsule, Take 200 mg by mouth 2 times daily, Disp: , Rfl:      glipiZIDE (GLUCOTROL) 10 MG tablet, Take 2.5 mg by mouth 2 times daily (before meals), Disp: , Rfl:      ketoconazole (NIZORAL) 2 % external shampoo, Apply topically twice a week Mon and Fri., Disp: , Rfl:      losartan (COZAAR) 25 MG tablet, Take 12.5 mg by mouth daily, Disp: , Rfl:      mineral oil-hydrophilic petrolatum (AQUAPHOR) external ointment, Apply topically 2 times daily, Disp: , Rfl:      omeprazole (PRILOSEC) 20 MG CR capsule, Take 20 mg by mouth daily, Disp: , Rfl:      oxyCODONE (ROXICODONE) 5 MG tablet, Take 1 tablet (5 mg) by mouth every 6 hours as needed for moderate to severe pain, Disp: 12 tablet, Rfl: 0     piperacillin-tazobactam (ZOSYN) 3-0.375 GM vial, Inject 3.375 g into the vein every 8 hours for 42 days 4 hour infusion, Disp: 1890 mL, Rfl: 0     polyethylene glycol (MIRALAX) 17 g packet, Take 1 packet by mouth daily, Disp: , Rfl:      rivaroxaban ANTICOAGULANT (XARELTO) 15 MG TABS tablet, Take by mouth daily (with dinner), Disp: , Rfl:      senna-docusate (SENOKOT-S/PERICOLACE) 8.6-50 MG tablet, Take 1 tablet by mouth 2 times daily And BID PRN, Disp: , Rfl:      simvastatin (ZOCOR) 40 MG tablet, Take 40 mg by mouth At Bedtime, Disp: , Rfl:      spironolactone (ALDACTONE) 25 MG tablet, Take 25 mg by mouth daily, Disp: , Rfl:      tamsulosin (FLOMAX) 0.4 MG capsule, Take 0.4 mg by mouth daily, Disp: ,  Rfl:     Review of Systems - 10 point Review of Systems is negative except for bilateral foot ulcers which is noted in HPI.      OBJECTIVE:  /60   Pulse 55   SpO2 93%   General appearance: Patient is alert and fully cooperative with history & exam.  No sign of distress is noted during the visit.    Vascular: Dorsalis pedis non-palpableBilateral.  Dermatologic:    VASC Wound Right heel (Active)   Pre Size Length 6 06/08/23 1300   Pre Size Width 5.5 06/08/23 1300   Pre Size Depth 0.8 06/08/23 1300   Pre Total Sq cm 33 06/08/23 1300   Post Size Length 9 01/13/23 1300   Post Size Width 9 01/13/23 1300   Post Size Depth 0.1 01/13/23 1300   Post Total Sq cm 81 01/13/23 1300   Description eschar 04/05/23 1000       VASC Wound Left heel (Active)   Pre Size Length 4 06/08/23 1300   Pre Size Width 4 06/08/23 1300   Pre Size Depth 0.2 06/08/23 1300   Pre Total Sq cm 16 06/08/23 1300   Post Size Length 7 01/13/23 1300   Post Size Width 6 01/13/23 1300   Post Size Depth 0.1 01/13/23 1300   Post Total Sq cm 42 01/13/23 1300       VASC Wound Right Distal foot (Active)   Pre Size Length 6 06/08/23 1300   Pre Size Width 17.4 06/08/23 1300   Pre Size Depth 1 06/08/23 1300   Pre Total Sq cm 105.6 06/08/23 1300       Incision/Surgical Site 06/01/23 Bilateral Foot (Active)   Incision Assessment [facility] 06/01/23 1045   Vida-Incision Assessment [facility] 06/01/23 1008   Drainage Description [facility] 06/01/23 1045   Dressing Intervention Clean, dry, intact 06/01/23 1045   Granular base majority of ulcerations distal right foot and bilateral heels.  There is increased depth along the plantar skin flap on the right foot with a small area of exposed bone.  Slight increased depth along plantar right heel, does not probe to bone.  No erythema bilateral feet.  Neurologic: Diminished to light touch Bilateral.  Musculoskeletal: TMA bilateral feet.    Imaging:     POC US Guidance Needle Placement    Result Date: 6/1/2023  Ultrasound was performed as  "guidance to an anesthesia procedure.  Click \"PACS images\" hyperlink below to view any stored images.  For specific procedure details, view procedure note authored by anesthesia.    POC US Guidance Needle Placement    Result Date: 5/15/2023  Ultrasound was performed as guidance to an anesthesia procedure.  Click \"PACS images\" hyperlink below to view any stored images.  For specific procedure details, view procedure note authored by anesthesia.    POC US Guidance Needle Placement    Result Date: 5/15/2023  Ultrasound was performed as guidance to an anesthesia procedure.  Click \"PACS images\" hyperlink below to view any stored images.  For specific procedure details, view procedure note authored by anesthesia.    MR Ankle Right w/o & w Contrast    Result Date: 5/11/2023  EXAM: MR ANKLE RIGHT W/O and W CONTRAST LOCATION: [facility] DATE/TIME: 5/11/2023 6:35 PM CDT INDICATION: Osteomyelitis of calcaneus COMPARISON: None. TECHNIQUE: Routine. Additional postgadolinium T1 sequences were obtained. IV CONTRAST: 8 ml Gadavist FINDINGS: TENDONS: -Peroneal: Peroneus longus and brevis tendons are intact. No tendinopathy or tenosynovitis. No subluxation. -Medial: Posterior tibialis tendon is intact. No tendinopathy or tenosynovitis. Flexor digitorum longus and flexor hallucis longus tendons are normal. No tenosynovitis. -Anterior: Anterior tibialis, extensor hallucis longus, and extensor digitorum longus tendons are normal. No tenosynovitis. -Achilles: Mild Achilles tendinopathy without tearing. LIGAMENTS: -Anterior talofibular ligament: Intact. -Calcaneofibular ligament: Intact. -Posterior talofibular ligament: Intact. -Syndesmotic inferior tibiofibular ligaments: Intact. -Deltoid ligament complex: Intact. -Spring ligament complex: Intact. JOINTS AND BONES: -There is a soft tissue ulceration along the inferolateral aspect of the hindfoot which approaches the periosteal margin of the calcaneus where there " is bone signal abnormality worrisome for very early developing osteomyelitis confined to the superficial  margin identified on series 5 and 12, images 10 and 9 respectively and also seen on series 8, 9 and 11, image 5. No significant bone destruction is identified. No effusion to suggest septic arthropathy. No evidence for fracture. SOFT TISSUES: -Plantar fascia: Intact. No acute fasciitis or tear. -Sinus tarsi and tarsal tunnel: Normal. -Muscles: Normal. -Extensive but nonspecific edema or cellulitis about the lower leg. Previously described ulceration along the inferolateral aspect of the hindfoot. No evidence for foreign body.     IMPRESSION: 1.  Bone signal abnormality and abnormal enhancement worrisome for early developing osteomyelitis confined to the superficial margin of the inferolateral aspect of the calcaneus which is seen at the base of an ulceration. 2.  Surrounding edema or cellulitis about this region, as well as around the lower leg and ankle but no evidence for organized fluid collection to suggest abscess. 3.  No evidence for fracture. 4.  No significant tendinous or ligamentous pathology. There is mild Achilles tendinopathy. 5.  Degenerative change at the midfoot, incompletely visualized at the navicular cuneiform and TMT joints.         Picture:

## 2023-06-08 NOTE — PATIENT INSTRUCTIONS
Important lnstructions    PLEASE ORDER BILATERAL PRAFO BOOTS FOR PATIENT TO BE WORN AT ALL TIMES.    WEIGHT BEARING STATUS: You are to remain NON WEIGHT BEARING on your right foot. NON WEIGHT BEARING MEANS NO PRESSURE ON YOUR FOOT OR HEEL AT ANY TIME FOR ANY REASON!    2. OFFLOADING DEVICE: Must use a A WHEELCHAIR at all times! (do not use affected foot to push wheelchair)    3. STABILIZATION DEVICE: Use a PRAFO BOOT BILATERALLY. You will need to WEAR THIS AT ALL TIMES EVEN WHILE IN BED.     4. ELEVATE: Elevating your leg means laying with your head on a pillow and your foot ABOVE YOUR HEART.     5. DO NOT MOVE YOUR FOOT.  There is a risk of worsening the wound or incision. To give yourself a higher chance of healing, please DO NOT swing foot back and forth and wiggle foot/toes especially when inside a stabilization device. Limited movement is allowable with therapy as recommended by the doctor.     6. TAKE A PROTEIN SHAKE TWICE A DAY.  (For ex: Boost, Ensure, Glucerna)      Dressing Change lnstructions:    LEFT HEEL WOUND:    - Dressing Application of  Endoform for left heel wound:    1. Endoform should be cut to the size of the wound.  It should touch the edges of the ulcer. It is okay if it overlap the edges a small amount.  Then, moisten the Endoform with saline.(If it is easier for you, you can moisten it before laying it in the wound)    2. Cover the wound with Endoform, followed by dry gauze, and secure with roll gauze if needed.     3. Endoform is naturally used by the body over time so you may not find it in the wound when you change your dressing.  If you do find some, gently cleanse the wound with saline. Do not remove the remaining Endoform, which may appear as an off-white to max gel, just add Endoform on top.  Usually, more Endoform will need to be added every 5-7 days.     4. Change your top dressing every 1-2 days or as needed depending on drainage.    -Endoform is a collagen dressing created  from ovine (sheep) fore-stomach tissue. The collagen extracellular matrix transforms into a soft conforming sheet, which is naturally incorporated into the wound over time.  Collagen dressings are used to stimulate wound healing.   ,      RIGHT FOOT AND RIGHT HEEL WOUNDS:          Standard - Wound Vac Instructions    1. 3x weekly and as needed cleanse the area with NS    2. Pat dry    3. Apply Cavilon no sting barrier wipe to the skin surrounding the wound to protect from drainage/maceration    4. Apply drape around incision. Cut strip of drape and apply to skin if bridging is needed; plan this area in advance; should not be over bony prominence     5. Cut the foam to fit the area of the incision    6. Cut narrow strip of foam if bridging    7. Cover foam with drape to obtain air tight seal    8. Cut opening the size of a quarter for where the suction pad will be applied    9. Apply Suction pad    10. 125mmHG suction continuous    [employer] can be reached at [phone], 24 hours a day 7 days a week     - Back up plan in place:     If the negative pressure wound therapy malfunctions or unable to maintain seal: dressing must be removed and reapplied within 2 hours of the incident. If unable to reapply negative pressure wound dressing, place Normal Saline moistened gauze in wound bed and cover with appropriate dressing to keep wound bed moist.  Change wet-to-dry dressing two times a day until healthcare staff can re-implement negative pressure therapy. Change canister at least weekly.  [employer] can be reached at [phone], 24 hours a day 7 days a week    Information on Vacuum-Assisted Closure of a Wound  Vacuum-assisted closure (VAC) of a wound is a type of treatment to help wounds heal. It s also known as negative pressure wound therapy. During the treatment, a device lowers air pressure on the wound. This can help the wound heal more quickly.  Understanding the wound VAC system  A wound  VAC system has several parts. A foam or gauze dressing is put directly on the wound. The dressing is changed every 24 to 72 hours. An adhesive film covers and seals the dressing and wound. A drainage tube leads from under the adhesive film and connects to a portable vacuum pump. This pump removes air pressure over the wound. It may do this constantly. Or it may do it in cycles. During the treatment, you ll need to carry the portable pump everywhere you go.  Why wound VAC is used  You might need this therapy for a recent traumatic wound. Or you may need it for a chronic wound. This is a wound that does not heal the way it should over time. This can happen with wounds in people who have diabetes. You may need a wound VAC if you ve had a recent skin graft. And you may need a wound VAC for a large wound. Large wounds can take a longer time to heal.  A wound vacuum system may help your wound heal more quickly by:  Draining extra fluid from the wound  Reducing swelling  Reducing bacteria in the wound  Keeping your wound moist and warm  Helping draw together wound edges  Increasing blood flow to your wound  Decreasing inflammation  Wound VAC offers some other advantages over other types of wound care. It may decrease your overall discomfort. The dressings usually need to be changed less often. And they may be easier to keep in position.         It IS NOT ok to get your wound wet in the bath or shower    SEEK MEDICAL CARE IF:  You have an increase in swelling, pain, or redness around the wound.  You have an increase in the amount of pus coming from the wound.  There is a bad smell coming from the wound.  The wound appears to be worsening/enlarging  You have a fever greater than 101.5 F      It is ok to continue current wound care treatment/products for the next 2-3 days until new wound care supplies are ordered and arrive. If longer than this please contact our office at [phone].        We want to hear from you!   In  the next few weeks, you should receive a call or email to complete a survey about your visit at [facility]. Please help us improve your appointment experience by letting us know how we did today. We strive to make your experience good and value any ways in which we could do better.      We value your input and suggestions.    Thank you for choosing the [facility]!

## 2023-06-09 LAB
ALBUMIN SERPL BCG-MCNC: 3.5 G/DL (ref 3.5–5.2)
ALP SERPL-CCNC: 66 U/L (ref 40–129)
ALT SERPL W P-5'-P-CCNC: <5 U/L (ref 10–50)
ANION GAP SERPL CALCULATED.3IONS-SCNC: 11 MMOL/L (ref 7–15)
AST SERPL W P-5'-P-CCNC: 11 U/L (ref 10–50)
BASOPHILS # BLD AUTO: 0.1 10E3/UL (ref 0–0.2)
BASOPHILS NFR BLD AUTO: 1 %
BILIRUB SERPL-MCNC: 0.3 MG/DL
BUN SERPL-MCNC: 20.7 MG/DL (ref 8–23)
CALCIUM SERPL-MCNC: 9.2 MG/DL (ref 8.8–10.2)
CHLORIDE SERPL-SCNC: 105 MMOL/L (ref 98–107)
CREAT SERPL-MCNC: 1.25 MG/DL (ref 0.67–1.17)
CRP SERPL-MCNC: 36.03 MG/L
DEPRECATED HCO3 PLAS-SCNC: 25 MMOL/L (ref 22–29)
EOSINOPHIL # BLD AUTO: 0.4 10E3/UL (ref 0–0.7)
EOSINOPHIL NFR BLD AUTO: 6 %
ERYTHROCYTE [DISTWIDTH] IN BLOOD BY AUTOMATED COUNT: 16 % (ref 10–15)
GFR SERPL CREATININE-BSD FRML MDRD: 59 ML/MIN/1.73M2
GLUCOSE SERPL-MCNC: 141 MG/DL (ref 70–99)
HCT VFR BLD AUTO: 30.1 % (ref 40–53)
HGB BLD-MCNC: 9.4 G/DL (ref 13.3–17.7)
IMM GRANULOCYTES # BLD: 0 10E3/UL
IMM GRANULOCYTES NFR BLD: 0 %
LYMPHOCYTES # BLD AUTO: 1 10E3/UL (ref 0.8–5.3)
LYMPHOCYTES NFR BLD AUTO: 15 %
MCH RBC QN AUTO: 25.6 PG (ref 26.5–33)
MCHC RBC AUTO-ENTMCNC: 31.2 G/DL (ref 31.5–36.5)
MCV RBC AUTO: 82 FL (ref 78–100)
MONOCYTES # BLD AUTO: 0.5 10E3/UL (ref 0–1.3)
MONOCYTES NFR BLD AUTO: 8 %
NEUTROPHILS # BLD AUTO: 4.7 10E3/UL (ref 1.6–8.3)
NEUTROPHILS NFR BLD AUTO: 70 %
NRBC # BLD AUTO: 0 10E3/UL
NRBC BLD AUTO-RTO: 0 /100
PLATELET # BLD AUTO: 217 10E3/UL (ref 150–450)
POTASSIUM SERPL-SCNC: 3.7 MMOL/L (ref 3.4–5.3)
PROT SERPL-MCNC: 7.1 G/DL (ref 6.4–8.3)
RBC # BLD AUTO: 3.67 10E6/UL (ref 4.4–5.9)
SODIUM SERPL-SCNC: 141 MMOL/L (ref 136–145)
WBC # BLD AUTO: 6.7 10E3/UL (ref 4–11)

## 2023-06-09 PROCEDURE — 36415 COLL VENOUS BLD VENIPUNCTURE: CPT | Mod: ORL | Performed by: NURSE PRACTITIONER

## 2023-06-09 PROCEDURE — 85025 COMPLETE CBC W/AUTO DIFF WBC: CPT | Mod: ORL | Performed by: NURSE PRACTITIONER

## 2023-06-09 PROCEDURE — 80053 COMPREHEN METABOLIC PANEL: CPT | Mod: ORL | Performed by: NURSE PRACTITIONER

## 2023-06-09 PROCEDURE — P9603 ONE-WAY ALLOW PRORATED MILES: HCPCS | Mod: ORL | Performed by: NURSE PRACTITIONER

## 2023-06-09 PROCEDURE — 86140 C-REACTIVE PROTEIN: CPT | Mod: ORL | Performed by: NURSE PRACTITIONER

## 2023-06-11 ENCOUNTER — LAB REQUISITION (OUTPATIENT)
Dept: LAB | Facility: CLINIC | Age: 78
End: 2023-06-11
Payer: MEDICARE

## 2023-06-11 DIAGNOSIS — D64.9 ANEMIA, UNSPECIFIED: ICD-10-CM

## 2023-06-12 ENCOUNTER — TELEPHONE (OUTPATIENT)
Dept: VASCULAR SURGERY | Facility: CLINIC | Age: 78
End: 2023-06-12
Payer: MEDICARE

## 2023-06-12 LAB
ANION GAP SERPL CALCULATED.3IONS-SCNC: 16 MMOL/L (ref 7–15)
BUN SERPL-MCNC: 13.1 MG/DL (ref 8–23)
CALCIUM SERPL-MCNC: 9.6 MG/DL (ref 8.8–10.2)
CHLORIDE SERPL-SCNC: 105 MMOL/L (ref 98–107)
CREAT SERPL-MCNC: 1.06 MG/DL (ref 0.67–1.17)
DEPRECATED HCO3 PLAS-SCNC: 21 MMOL/L (ref 22–29)
ERYTHROCYTE [DISTWIDTH] IN BLOOD BY AUTOMATED COUNT: 15.9 % (ref 10–15)
GFR SERPL CREATININE-BSD FRML MDRD: 72 ML/MIN/1.73M2
GLUCOSE SERPL-MCNC: 189 MG/DL (ref 70–99)
HCT VFR BLD AUTO: 33.9 % (ref 40–53)
HGB BLD-MCNC: 10.4 G/DL (ref 13.3–17.7)
MCH RBC QN AUTO: 25.6 PG (ref 26.5–33)
MCHC RBC AUTO-ENTMCNC: 30.7 G/DL (ref 31.5–36.5)
MCV RBC AUTO: 84 FL (ref 78–100)
PLATELET # BLD AUTO: 220 10E3/UL (ref 150–450)
POTASSIUM SERPL-SCNC: 3.8 MMOL/L (ref 3.4–5.3)
RBC # BLD AUTO: 4.06 10E6/UL (ref 4.4–5.9)
SODIUM SERPL-SCNC: 142 MMOL/L (ref 136–145)
WBC # BLD AUTO: 7.5 10E3/UL (ref 4–11)

## 2023-06-12 PROCEDURE — P9604 ONE-WAY ALLOW PRORATED TRIP: HCPCS | Mod: ORL | Performed by: FAMILY MEDICINE

## 2023-06-12 PROCEDURE — 80048 BASIC METABOLIC PNL TOTAL CA: CPT | Mod: ORL | Performed by: FAMILY MEDICINE

## 2023-06-12 PROCEDURE — 36415 COLL VENOUS BLD VENIPUNCTURE: CPT | Mod: ORL | Performed by: FAMILY MEDICINE

## 2023-06-12 PROCEDURE — 85027 COMPLETE CBC AUTOMATED: CPT | Mod: ORL | Performed by: FAMILY MEDICINE

## 2023-06-12 NOTE — TELEPHONE ENCOUNTER
Spoke with patients sister   Marie. She will talk to [facility] center to get new pranael boots.

## 2023-06-12 NOTE — TELEPHONE ENCOUNTER
Caller: Daughter, marie    Provider: LINO Spears    Detailed reason for call: Marie asking for a call back to discuss questions about the order for the prafo boot.  She states the care center have not been very helpful and she was told to get the questions answered from one of Dr. Spears's nurses.     Best phone number to contact: [phone]    Best time to contact: any    Ok to leave a detailed message: No- she wants to talk to someone    Ok to speak to authorized person if needed: No      (Noted to patient if reason is related to wound or incision, to please send a photo via email or [employer].)

## 2023-06-13 ENCOUNTER — TRANSITIONAL CARE UNIT VISIT (OUTPATIENT)
Dept: GERIATRICS | Facility: CLINIC | Age: 78
End: 2023-06-13
Payer: MEDICARE

## 2023-06-13 VITALS
HEART RATE: 64 BPM | OXYGEN SATURATION: 96 % | DIASTOLIC BLOOD PRESSURE: 60 MMHG | BODY MASS INDEX: 25.64 KG/M2 | SYSTOLIC BLOOD PRESSURE: 124 MMHG | WEIGHT: 179.1 LBS | TEMPERATURE: 97.7 F | RESPIRATION RATE: 18 BRPM | HEIGHT: 70 IN

## 2023-06-13 DIAGNOSIS — E11.42 POLYNEUROPATHY DUE TO TYPE 2 DIABETES MELLITUS (H): ICD-10-CM

## 2023-06-13 DIAGNOSIS — M86.9 OSTEOMYELITIS OF RIGHT FOOT, UNSPECIFIED TYPE (H): ICD-10-CM

## 2023-06-13 DIAGNOSIS — I73.9 PAD (PERIPHERAL ARTERY DISEASE) (H): ICD-10-CM

## 2023-06-13 DIAGNOSIS — E11.59 TYPE 2 DIABETES MELLITUS WITH OTHER CIRCULATORY COMPLICATION, WITH LONG-TERM CURRENT USE OF INSULIN (H): ICD-10-CM

## 2023-06-13 DIAGNOSIS — Z98.890 S/P FOOT SURGERY, RIGHT: Primary | ICD-10-CM

## 2023-06-13 DIAGNOSIS — J44.9 CHRONIC OBSTRUCTIVE PULMONARY DISEASE, UNSPECIFIED COPD TYPE (H): ICD-10-CM

## 2023-06-13 DIAGNOSIS — Z79.4 TYPE 2 DIABETES MELLITUS WITH OTHER CIRCULATORY COMPLICATION, WITH LONG-TERM CURRENT USE OF INSULIN (H): ICD-10-CM

## 2023-06-13 DIAGNOSIS — Z46.89 ENCOUNTER FOR MANAGEMENT OF WOUND VAC: ICD-10-CM

## 2023-06-13 PROCEDURE — 99309 SBSQ NF CARE MODERATE MDM 30: CPT | Performed by: NURSE PRACTITIONER

## 2023-06-13 NOTE — PROGRESS NOTES
[facility] GERIATRIC SERVICE  Episodic/Acute/Follow-Up  [place] MRN: [id number]. Place of Service where encounter took place:  [facility] ([facility]) [[id number]]   Chief Complaint   Patient presents with     RECHECK    HPI: Ever Crane  is a 78 year old (1945), who is being seen today for an episodic care visit. Today's concern is:    Ever seen today on routine follow-up as he continues to rehab in TCU.  He denies any new signs or symptoms, he denies fever, headache, dizziness, shortness of breath, allergy symptoms and he continues to deny any pain.  He follows up with surgeon next week.    Past Medical and Surgical History reviewed in Epic today.  MEDICATIONS:  Current Outpatient Medications   Medication Sig Dispense Refill     acetaminophen (TYLENOL) 325 MG tablet 1000 mg BID scheduled and 650 mg bid prn       albuterol (PROAIR HFA/PROVENTIL HFA/VENTOLIN HFA) 108 (90 Base) MCG/ACT inhaler Inhale 2 puffs into the lungs 2 times daily as needed       clopidogrel (PLAVIX) 75 MG tablet Take 1 tablet (75 mg) by mouth daily Start taking medication the day after the procedure. 90 tablet 1     dulaglutide (TRULICITY) 0.75 MG/0.5ML pen Inject 0.75 mg Subcutaneous every 7 days       furosemide (LASIX) 40 MG tablet Take 40 mg by mouth daily       gabapentin (NEURONTIN) 100 MG capsule Take 200 mg by mouth 2 times daily       glipiZIDE (GLUCOTROL) 10 MG tablet Take 2.5 mg by mouth 2 times daily (before meals)       ketoconazole (NIZORAL) 2 % external shampoo Apply topically twice a week Mon and Fri.       losartan (COZAAR) 25 MG tablet Take 12.5 mg by mouth daily       mineral oil-hydrophilic petrolatum (AQUAPHOR) external ointment Apply topically 2 times daily       omeprazole (PRILOSEC) 20 MG CR capsule Take 20 mg by mouth daily       oxyCODONE (ROXICODONE) 5 MG tablet Take 1 tablet (5 mg) by mouth every 6 hours as needed for moderate to severe pain 12 tablet 0     piperacillin-tazobactam (ZOSYN) 3-0.375 GM vial  "Inject 3.375 g into the vein every 8 hours for 42 days 4 hour infusion 1890 mL 0     polyethylene glycol (MIRALAX) 17 g packet Take 1 packet by mouth daily       rivaroxaban ANTICOAGULANT (XARELTO) 15 MG TABS tablet Take by mouth daily (with dinner)       senna-docusate (SENOKOT-S/PERICOLACE) 8.6-50 MG tablet Take 1 tablet by mouth 2 times daily And BID PRN       simvastatin (ZOCOR) 40 MG tablet Take 40 mg by mouth At Bedtime       spironolactone (ALDACTONE) 25 MG tablet Take 25 mg by mouth daily       tamsulosin (FLOMAX) 0.4 MG capsule Take 0.4 mg by mouth daily       Objective: /60   Pulse 64   Temp 97.7  F (36.5  C)   Resp 18   Ht 1.778 m (5' 10\")   Wt 81.2 kg (179 lb 1.6 oz)   SpO2 96%   BMI 25.70 kg/m    Exam:  GENERAL APPEARANCE: Alert, in no distress, cooperative.   RESP: Respiratory effort good, no respiratory distress, Lung sounds clear. On RA.   CV: Auscultation of heart reveals S1, S2, rate controlled and rhythm irregular, no murmur, no rub or gallop, Edema trace BLE. Peripheral pulses are 2+.  PSYCH: Insight, judgement, and memory are impaired at baseline, affect and mood are happy/engaged.    Labs: Labs done in facility are in EPIC. Please refer to them using [email or web address]/Care Everywhere.    ASSESSMENT/PLAN:  S/P foot surgery, right  Osteomyelitis of right foot, unspecified type (H)  Encounter for management of wound VAC  Type 2 diabetes mellitus with other circulatory complication, with long-term current use of insulin (H)  Polyneuropathy due to type 2 diabetes mellitus (H)  PAD (peripheral artery disease) (H)  Chronic obstructive pulmonary disease, unspecified COPD type (H)  Acute on chronic.  Ongoing.    No use of as needed Zyrtec, will discontinue and reduce polypharmacy.    No complaints of pain, despite previous surgeries, multiple procedures, dressing changes.  We will slightly decrease Tylenol and monitor for pain.  We will also alter as needed Tylenol dosing to have available.    Diabetes " appears well managed with current plan of care.  Follow-up within 1 week or as needed.    Orders:  1. Discontinue Zyrtec.  2. Decrease Tylenol to 1000mg PO BID. Dx: pain.  3. Change PRN Tylenol to 650mg PO BID PRN. Dx: pain/fever.    Electronically signed by:  Dr. Yanna Dozier, APRN CNP DNP

## 2023-06-13 NOTE — LETTER
6/13/2023        RE: Ever Crane  [address]  [address]  [place] [address]        [facility] GERIATRIC SERVICE  Episodic/Acute/Follow-Up  [place] MRN: [id number]. Place of Service where encounter took place:  [facility] ([facility] [[id number]]   Chief Complaint   Patient presents with     RECHECK    HPI: Ever Crane  is a 78 year old (1945), who is being seen today for an episodic care visit. Today's concern is:    Ever seen today on routine follow-up as he continues to rehab in [facility].  He denies any new signs or symptoms, he denies fever, headache, dizziness, shortness of breath, allergy symptoms and he continues to deny any pain.  He follows up with surgeon next week.    Past Medical and Surgical History reviewed in Epic today.  MEDICATIONS:  Current Outpatient Medications   Medication Sig Dispense Refill     acetaminophen (TYLENOL) 325 MG tablet 1000 mg BID scheduled and 650 mg bid prn       albuterol (PROAIR HFA/PROVENTIL HFA/VENTOLIN HFA) 108 (90 Base) MCG/ACT inhaler Inhale 2 puffs into the lungs 2 times daily as needed       clopidogrel (PLAVIX) 75 MG tablet Take 1 tablet (75 mg) by mouth daily Start taking medication the day after the procedure. 90 tablet 1     dulaglutide (TRULICITY) 0.75 MG/0.5ML pen Inject 0.75 mg Subcutaneous every 7 days       furosemide (LASIX) 40 MG tablet Take 40 mg by mouth daily       gabapentin (NEURONTIN) 100 MG capsule Take 200 mg by mouth 2 times daily       glipiZIDE (GLUCOTROL) 10 MG tablet Take 2.5 mg by mouth 2 times daily (before meals)       ketoconazole (NIZORAL) 2 % external shampoo Apply topically twice a week Mon and Fri.       losartan (COZAAR) 25 MG tablet Take 12.5 mg by mouth daily       mineral oil-hydrophilic petrolatum (AQUAPHOR) external ointment Apply topically 2 times daily       omeprazole (PRILOSEC) 20 MG CR capsule Take 20 mg by mouth daily       oxyCODONE (ROXICODONE) 5 MG tablet Take 1 tablet (5 mg) by mouth every 6 hours as  "needed for moderate to severe pain 12 tablet 0     piperacillin-tazobactam (ZOSYN) 3-0.375 GM vial Inject 3.375 g into the vein every 8 hours for 42 days 4 hour infusion 1890 mL 0     polyethylene glycol (MIRALAX) 17 g packet Take 1 packet by mouth daily       rivaroxaban ANTICOAGULANT (XARELTO) 15 MG TABS tablet Take by mouth daily (with dinner)       senna-docusate (SENOKOT-S/PERICOLACE) 8.6-50 MG tablet Take 1 tablet by mouth 2 times daily And BID PRN       simvastatin (ZOCOR) 40 MG tablet Take 40 mg by mouth At Bedtime       spironolactone (ALDACTONE) 25 MG tablet Take 25 mg by mouth daily       tamsulosin (FLOMAX) 0.4 MG capsule Take 0.4 mg by mouth daily       Objective: /60   Pulse 64   Temp 97.7  F (36.5  C)   Resp 18   Ht 1.778 m (5' 10\")   Wt 81.2 kg (179 lb 1.6 oz)   SpO2 96%   BMI 25.70 kg/m    Exam:  GENERAL APPEARANCE: Alert, in no distress, cooperative.   RESP: Respiratory effort good, no respiratory distress, Lung sounds clear. On RA.   CV: Auscultation of heart reveals S1, S2, rate controlled and rhythm irregular, no murmur, no rub or gallop, Edema trace BLE. Peripheral pulses are 2+.  PSYCH: Insight, judgement, and memory are impaired at baseline, affect and mood are happy/engaged.    Labs: Labs done in facility are in EPIC. Please refer to them using [employer]Care Everywhere.    ASSESSMENT/PLAN:  S/P foot surgery, right  Osteomyelitis of right foot, unspecified type (H)  Encounter for management of wound VAC  Type 2 diabetes mellitus with other circulatory complication, with long-term current use of insulin (H)  Polyneuropathy due to type 2 diabetes mellitus (H)  PAD (peripheral artery disease) (H)  Chronic obstructive pulmonary disease, unspecified COPD type (H)  Acute on chronic.  Ongoing.    No use of as needed Zyrtec, will discontinue and reduce polypharmacy.    No complaints of pain, despite previous surgeries, multiple procedures, dressing changes.  We will slightly decrease Tylenol " and monitor for pain.  We will also alter as needed Tylenol dosing to have available.    Diabetes appears well managed with current plan of care.  Follow-up within 1 week or as needed.    Orders:  1. Discontinue Zyrtec.  2. Decrease Tylenol to 1000mg PO BID. Dx: pain.  3. Change PRN Tylenol to 650mg PO BID PRN. Dx: pain/fever.    Electronically signed by:  KEVIN Wen CNP DNP          Sincerely,        KEVIN Bansal CNP

## 2023-06-14 ENCOUNTER — TELEPHONE (OUTPATIENT)
Dept: VASCULAR SURGERY | Facility: CLINIC | Age: 78
End: 2023-06-14
Payer: MEDICARE

## 2023-06-14 NOTE — TELEPHONE ENCOUNTER
Spoke with nurse at TCU.  Gave verbal orders per below.  They will continue to use wound vac on distal foot and hold for now on heel as maceration only present on heel.       From: Miguelangel Spears DPM   Sent: 6/14/2023  11:44 AM CDT   To: *     With someone please follow-up with nursing at this facility to hold the wound VAC x2 to 3 days or until maceration subsides.  Then reapply wound VAC and follow-up with me as scheduled.  Thank you

## 2023-06-15 ENCOUNTER — LAB REQUISITION (OUTPATIENT)
Dept: LAB | Facility: CLINIC | Age: 78
End: 2023-06-15
Payer: MEDICARE

## 2023-06-15 DIAGNOSIS — L97.421 NON-PRESSURE CHRONIC ULCER OF LEFT HEEL AND MIDFOOT LIMITED TO BREAKDOWN OF SKIN (H): ICD-10-CM

## 2023-06-16 LAB
ALBUMIN SERPL BCG-MCNC: 3.8 G/DL (ref 3.5–5.2)
ALP SERPL-CCNC: 74 U/L (ref 40–129)
ALT SERPL W P-5'-P-CCNC: <5 U/L (ref 0–70)
ANION GAP SERPL CALCULATED.3IONS-SCNC: 11 MMOL/L (ref 7–15)
AST SERPL W P-5'-P-CCNC: 9 U/L (ref 0–45)
BASOPHILS # BLD AUTO: 0.1 10E3/UL (ref 0–0.2)
BASOPHILS NFR BLD AUTO: 1 %
BILIRUB SERPL-MCNC: 0.4 MG/DL
BUN SERPL-MCNC: 13.5 MG/DL (ref 8–23)
CALCIUM SERPL-MCNC: 9.6 MG/DL (ref 8.8–10.2)
CHLORIDE SERPL-SCNC: 105 MMOL/L (ref 98–107)
CREAT SERPL-MCNC: 1.13 MG/DL (ref 0.67–1.17)
CRP SERPL-MCNC: 39.82 MG/L
DEPRECATED HCO3 PLAS-SCNC: 27 MMOL/L (ref 22–29)
EOSINOPHIL # BLD AUTO: 0.7 10E3/UL (ref 0–0.7)
EOSINOPHIL NFR BLD AUTO: 9 %
ERYTHROCYTE [DISTWIDTH] IN BLOOD BY AUTOMATED COUNT: 15.9 % (ref 10–15)
GFR SERPL CREATININE-BSD FRML MDRD: 67 ML/MIN/1.73M2
GLUCOSE SERPL-MCNC: 107 MG/DL (ref 70–99)
HCT VFR BLD AUTO: 33 % (ref 40–53)
HGB BLD-MCNC: 10.1 G/DL (ref 13.3–17.7)
IMM GRANULOCYTES # BLD: 0 10E3/UL
IMM GRANULOCYTES NFR BLD: 0 %
LYMPHOCYTES # BLD AUTO: 1.1 10E3/UL (ref 0.8–5.3)
LYMPHOCYTES NFR BLD AUTO: 15 %
MCH RBC QN AUTO: 25.1 PG (ref 26.5–33)
MCHC RBC AUTO-ENTMCNC: 30.6 G/DL (ref 31.5–36.5)
MCV RBC AUTO: 82 FL (ref 78–100)
MONOCYTES # BLD AUTO: 0.5 10E3/UL (ref 0–1.3)
MONOCYTES NFR BLD AUTO: 7 %
NEUTROPHILS # BLD AUTO: 5.2 10E3/UL (ref 1.6–8.3)
NEUTROPHILS NFR BLD AUTO: 68 %
NRBC # BLD AUTO: 0 10E3/UL
NRBC BLD AUTO-RTO: 0 /100
PLATELET # BLD AUTO: 225 10E3/UL (ref 150–450)
POTASSIUM SERPL-SCNC: 4.3 MMOL/L (ref 3.4–5.3)
PROT SERPL-MCNC: 7.4 G/DL (ref 6.4–8.3)
RBC # BLD AUTO: 4.02 10E6/UL (ref 4.4–5.9)
SODIUM SERPL-SCNC: 143 MMOL/L (ref 136–145)
WBC # BLD AUTO: 7.6 10E3/UL (ref 4–11)

## 2023-06-16 PROCEDURE — 85025 COMPLETE CBC W/AUTO DIFF WBC: CPT | Mod: ORL | Performed by: NURSE PRACTITIONER

## 2023-06-16 PROCEDURE — 86140 C-REACTIVE PROTEIN: CPT | Mod: ORL | Performed by: NURSE PRACTITIONER

## 2023-06-16 PROCEDURE — 80053 COMPREHEN METABOLIC PANEL: CPT | Mod: ORL | Performed by: NURSE PRACTITIONER

## 2023-06-18 ENCOUNTER — TELEPHONE (OUTPATIENT)
Dept: GERIATRICS | Facility: CLINIC | Age: 78
End: 2023-06-18
Payer: MEDICARE

## 2023-06-18 NOTE — TELEPHONE ENCOUNTER
Resident had a Ultrasound done of his right lower leg due to redness and warmth.  Found to have a perineal thrombus.    Is already on Xarelto.  Elevates legs and no complaints of pain.  Light wraps legs with gauze.      No new orders.  Will pass it on to regular NP.    Electronically signed by Rosa Maria Cerna RN, CNP

## 2023-06-20 ENCOUNTER — TRANSITIONAL CARE UNIT VISIT (OUTPATIENT)
Dept: GERIATRICS | Facility: CLINIC | Age: 78
End: 2023-06-20
Payer: MEDICARE

## 2023-06-20 VITALS
HEART RATE: 76 BPM | SYSTOLIC BLOOD PRESSURE: 135 MMHG | WEIGHT: 187.7 LBS | OXYGEN SATURATION: 97 % | TEMPERATURE: 97.7 F | BODY MASS INDEX: 26.87 KG/M2 | DIASTOLIC BLOOD PRESSURE: 65 MMHG | HEIGHT: 70 IN | RESPIRATION RATE: 18 BRPM

## 2023-06-20 DIAGNOSIS — I82.451 ACUTE DEEP VEIN THROMBOSIS (DVT) OF RIGHT PERONEAL VEIN (H): Primary | ICD-10-CM

## 2023-06-20 DIAGNOSIS — I73.9 PAD (PERIPHERAL ARTERY DISEASE) (H): ICD-10-CM

## 2023-06-20 DIAGNOSIS — Z98.890 S/P FOOT SURGERY, RIGHT: ICD-10-CM

## 2023-06-20 DIAGNOSIS — M86.9 OSTEOMYELITIS OF RIGHT FOOT, UNSPECIFIED TYPE (H): ICD-10-CM

## 2023-06-20 DIAGNOSIS — Z46.89 ENCOUNTER FOR MANAGEMENT OF WOUND VAC: ICD-10-CM

## 2023-06-20 PROCEDURE — 99310 SBSQ NF CARE HIGH MDM 45: CPT | Performed by: NURSE PRACTITIONER

## 2023-06-20 NOTE — LETTER
6/20/2023        RE: Ever Crane  [address]  [address]  [place] [address]        [facility] GERIATRIC SERVICE  Episodic/Acute/Follow-Up  [place] MRN: [id number]. Place of Service where encounter took place:  [facility] [place] [facility]) [[id number]]   Chief Complaint   Patient presents with     RECHECK    HPI: Ever Crane  is a 78 year old (1945), who is being seen today for an episodic care visit. Today's concern is:    Ever seen today on routine follow-up as he continues to rehab in TCU.  Last week, toward the end of the week, nursing reported that his right lower extremity was significantly swollen with wound care compromise.  Provider notified both infectious disease and surgeon at the time, and there were no new orders given.  Instead, provider stopped wound VAC and held through the weekend.  Also obtained ultrasound as he is postoperative and swelling was unilateral.  Over the weekend, ultrasound did result and showed peroneal vein thrombosis, but this was not treated by on-call provider.    Today, Ever seen sitting up on the edge of his bed.  He denies any pain in the right lower extremity or anywhere else.  He has been feeling pretty good and denies any headaches, dizziness, fever, chills, malaise.  His appetite is good.  He denies any nausea or heartburn (though notes a history of this), and he says his bowels and bladder are working fine.  He is sleeping well.    Past Medical and Surgical History reviewed in Epic today.  MEDICATIONS:  Current Outpatient Medications   Medication Sig Dispense Refill     acetaminophen (TYLENOL) 325 MG tablet 1000 mg BID scheduled and 650 mg bid prn       albuterol (PROAIR HFA/PROVENTIL HFA/VENTOLIN HFA) 108 (90 Base) MCG/ACT inhaler Inhale 2 puffs into the lungs 2 times daily as needed       clopidogrel (PLAVIX) 75 MG tablet Take 1 tablet (75 mg) by mouth daily Start taking medication the day after the procedure. 90 tablet 1     dulaglutide (TRULICITY)  "0.75 MG/0.5ML pen Inject 0.75 mg Subcutaneous every 7 days       furosemide (LASIX) 40 MG tablet Take 40 mg by mouth daily       gabapentin (NEURONTIN) 100 MG capsule Take 200 mg by mouth 2 times daily       glipiZIDE (GLUCOTROL) 10 MG tablet Take 2.5 mg by mouth 2 times daily (before meals)       ketoconazole (NIZORAL) 2 % external shampoo Apply topically twice a week Mon and Fri.       losartan (COZAAR) 25 MG tablet Take 12.5 mg by mouth daily       mineral oil-hydrophilic petrolatum (AQUAPHOR) external ointment Apply topically 2 times daily       omeprazole (PRILOSEC) 20 MG CR capsule Take 20 mg by mouth daily       oxyCODONE (ROXICODONE) 5 MG tablet Take 1 tablet (5 mg) by mouth every 6 hours as needed for moderate to severe pain 12 tablet 0     piperacillin-tazobactam (ZOSYN) 3-0.375 GM vial Inject 3.375 g into the vein every 8 hours for 42 days 4 hour infusion 1890 mL 0     polyethylene glycol (MIRALAX) 17 g packet Take 1 packet by mouth daily       rivaroxaban ANTICOAGULANT (XARELTO) 15 MG TABS tablet Take 15 mg by mouth 2 times daily X 21 days, then change to 20mg/day       senna-docusate (SENOKOT-S/PERICOLACE) 8.6-50 MG tablet Take 1 tablet by mouth 2 times daily And BID PRN       simvastatin (ZOCOR) 40 MG tablet Take 40 mg by mouth At Bedtime       spironolactone (ALDACTONE) 25 MG tablet Take 25 mg by mouth daily       tamsulosin (FLOMAX) 0.4 MG capsule Take 0.4 mg by mouth daily       Objective: /65   Pulse 76   Temp 97.7  F (36.5  C)   Resp 18   Ht 1.778 m (5' 10\")   Wt 85.1 kg (187 lb 11.2 oz)   SpO2 97%   BMI 26.93 kg/m    Exam:  GENERAL APPEARANCE: Alert, in no distress, cooperative.   RESP: Respiratory effort good, no respiratory distress, Lung sounds clear. On RA.   CV: Auscultation of heart reveals S1, S2, rate controlled and rhythm irregular, no murmur, no rub or gallop, Edema 1-2+ BLE (which is improved per nursing), rubor present on BLE at baseline.  PSYCH: Insight, judgement, and " memory are impaired at baseline, affect and mood are happy/engaged.    Labs: Labs done in facility are in EPIC. Please refer to them using [employer]/Care Everywhere.    ASSESSMENT/PLAN:  Acute deep vein thrombosis (DVT) of right peroneal vein (H)  S/P foot surgery, right  Osteomyelitis of right foot, unspecified type (H)  Encounter for management of wound VAC  PAD (peripheral artery disease) (H)  Acute on chronic.  Tenuous.    Provider extensively reviewed records, including ultrasound report as noted above.    Peroneal vein thrombosis is considered a provoked DVT in this case, especially given Ever is postoperative and clot is present close to surgical site.  Provider personally messaged surgeon to notify him of this.    Provider coordinated care with pharmacist to discuss treatment failure with Xarelto or consideration of Lovenox to treat new DVT.    Provider coordinated care with medical director to verify this treatment plan.    Provider counseled Ever that he needs more anticoagulation to dissolve his blood clot.  He is nonweightbearing, but can continue his activities otherwise.  Did  at increased risk of bleeding.  Will increase Xarelto to therapeutic treatment dose for DVT as noted below.    Of note, wound VAC is still on hold, and in-house third-party wound care NP has adjusted site care (since no orders were received by surgeon).     Ever will follow-up with infectious disease on 6/21 and with vascular surgery on 6/22.  Follow-up within 1 week or as needed.    Orders:  1. Increase Xarelto to 15mg PO BID x 21 days, then reduce to 20mg PO Qday. Dx: DVT.    Electronically signed by:  KEVIN Wen CNP DNP          Sincerely,        KEVIN Bansal CNP

## 2023-06-20 NOTE — PROGRESS NOTES
[facility] GERIATRIC SERVICE  Episodic/Acute/Follow-Up  [place] MRN: [id number]. Place of Service where encounter took place:  [facility] [place] [4002]   Chief Complaint   Patient presents with     RECHECK    HPI: Ever Crane  is a 78 year old (1945), who is being seen today for an episodic care visit. Today's concern is:    Ever seen today on routine follow-up as he continues to rehab in TCU.  Last week, toward the end of the week, nursing reported that his right lower extremity was significantly swollen with wound care compromise.  Provider notified both infectious disease and surgeon at the time, and there were no new orders given.  Instead, provider stopped wound VAC and held through the weekend.  Also obtained ultrasound as he is postoperative and swelling was unilateral.  Over the weekend, ultrasound did result and showed peroneal vein thrombosis, but this was not treated by on-call provider.    Today, Ever seen sitting up on the edge of his bed.  He denies any pain in the right lower extremity or anywhere else.  He has been feeling pretty good and denies any headaches, dizziness, fever, chills, malaise.  His appetite is good.  He denies any nausea or heartburn (though notes a history of this), and he says his bowels and bladder are working fine.  He is sleeping well.    Past Medical and Surgical History reviewed in Epic today.  MEDICATIONS:  Current Outpatient Medications   Medication Sig Dispense Refill     acetaminophen (TYLENOL) 325 MG tablet 1000 mg BID scheduled and 650 mg bid prn       albuterol (PROAIR HFA/PROVENTIL HFA/VENTOLIN HFA) 108 (90 Base) MCG/ACT inhaler Inhale 2 puffs into the lungs 2 times daily as needed       clopidogrel (PLAVIX) 75 MG tablet Take 1 tablet (75 mg) by mouth daily Start taking medication the day after the procedure. 90 tablet 1     dulaglutide (TRULICITY) 0.75 MG/0.5ML pen Inject 0.75 mg Subcutaneous every 7 days       furosemide (LASIX) 40 MG tablet Take  "40 mg by mouth daily       gabapentin (NEURONTIN) 100 MG capsule Take 200 mg by mouth 2 times daily       glipiZIDE (GLUCOTROL) 10 MG tablet Take 2.5 mg by mouth 2 times daily (before meals)       ketoconazole (NIZORAL) 2 % external shampoo Apply topically twice a week Mon and Fri.       losartan (COZAAR) 25 MG tablet Take 12.5 mg by mouth daily       mineral oil-hydrophilic petrolatum (AQUAPHOR) external ointment Apply topically 2 times daily       omeprazole (PRILOSEC) 20 MG CR capsule Take 20 mg by mouth daily       oxyCODONE (ROXICODONE) 5 MG tablet Take 1 tablet (5 mg) by mouth every 6 hours as needed for moderate to severe pain 12 tablet 0     piperacillin-tazobactam (ZOSYN) 3-0.375 GM vial Inject 3.375 g into the vein every 8 hours for 42 days 4 hour infusion 1890 mL 0     polyethylene glycol (MIRALAX) 17 g packet Take 1 packet by mouth daily       rivaroxaban ANTICOAGULANT (XARELTO) 15 MG TABS tablet Take 15 mg by mouth 2 times daily X 21 days, then change to 20mg/day       senna-docusate (SENOKOT-S/PERICOLACE) 8.6-50 MG tablet Take 1 tablet by mouth 2 times daily And BID PRN       simvastatin (ZOCOR) 40 MG tablet Take 40 mg by mouth At Bedtime       spironolactone (ALDACTONE) 25 MG tablet Take 25 mg by mouth daily       tamsulosin (FLOMAX) 0.4 MG capsule Take 0.4 mg by mouth daily       Objective: /65   Pulse 76   Temp 97.7  F (36.5  C)   Resp 18   Ht 1.778 m (5' 10\")   Wt 85.1 kg (187 lb 11.2 oz)   SpO2 97%   BMI 26.93 kg/m    Exam:  GENERAL APPEARANCE: Alert, in no distress, cooperative.   RESP: Respiratory effort good, no respiratory distress, Lung sounds clear. On RA.   CV: Auscultation of heart reveals S1, S2, rate controlled and rhythm irregular, no murmur, no rub or gallop, Edema 1-2+ BLE (which is improved per nursing), rubor present on BLE at baseline.  PSYCH: Insight, judgement, and memory are impaired at baseline, affect and mood are happy/engaged.    Labs: Labs done in facility are " in EPIC. Please refer to them using [employer] Everywhere. Ultrasound as noted here:      ASSESSMENT/PLAN:  Acute deep vein thrombosis (DVT) of right peroneal vein (H)  S/P foot surgery, right  Osteomyelitis of right foot, unspecified type (H)  Encounter for management of wound VAC  PAD (peripheral artery disease) (H)  Acute on chronic.  Tenuous.    Provider extensively reviewed records, including ultrasound report as noted above.    Peroneal vein thrombosis is considered a provoked DVT in this case, especially given Ever is postoperative and clot is present close to surgical site.  Provider personally messaged surgeon to notify him of this.    Provider coordinated care with pharmacist to discuss treatment failure with Xarelto or consideration of Lovenox to treat new DVT.    Provider coordinated care with medical director to verify this treatment plan.    Provider counseled Ever that he needs more anticoagulation to dissolve his blood clot.  He is nonweightbearing, but can continue his activities otherwise.  Did  at increased risk of bleeding.  Will increase Xarelto to therapeutic treatment dose for DVT as noted below.    Of note, wound VAC is still on hold, and in-house third-party wound care NP has adjusted site care (since no orders were received by surgeon).     Ever will follow-up with infectious disease on 6/21 and with vascular surgery on 6/22.  Follow-up within 1 week or as needed.    Orders:  1. Increase Xarelto to 15mg PO BID x 21 days, then reduce to 20mg PO Qday. Dx: DVT.    Electronically signed by:  Dr. Yanna Dozier, APRN CNP DNP

## 2023-06-21 ENCOUNTER — OFFICE VISIT (OUTPATIENT)
Dept: INFECTIOUS DISEASES | Facility: CLINIC | Age: 78
End: 2023-06-21
Payer: MEDICARE

## 2023-06-21 VITALS — HEART RATE: 59 BPM | OXYGEN SATURATION: 96 % | DIASTOLIC BLOOD PRESSURE: 60 MMHG | SYSTOLIC BLOOD PRESSURE: 142 MMHG

## 2023-06-21 DIAGNOSIS — M86.9 OSTEOMYELITIS OF ANKLE AND FOOT (H): Primary | ICD-10-CM

## 2023-06-21 PROCEDURE — 99214 OFFICE O/P EST MOD 30 MIN: CPT | Performed by: INTERNAL MEDICINE

## 2023-06-21 NOTE — PATIENT INSTRUCTIONS
Continue the IV antibiotic zosyn for 6 weeks beyond surgery 6/1/23.     Depending on how wounds look at Dr. Spears's visit 6/22, I would potentially add another antibiotic IV.     Labs look overall good. The inflammation test CRP is improved compared to May, but has increased slightly the past 2 weeks, this can be from infection or wound.     Will continue to monitor blood tests weekly.     Return 7/12/23.     Wagner Cr MD

## 2023-06-21 NOTE — Clinical Note
Please follow up with TCU -- I was routed to voicemail for orders for daptomycin as outlined in my clinic note. They need either verbal order or faxed order. Plan 500mg IV daily 4 weeks for now.  Wagner Cr MD

## 2023-06-21 NOTE — PROGRESS NOTES
[facility] FOLLOW UP NOTE    Date: 6/21/2023  Patient Name: Ever Crane   YOB: 1945  MRN: [id number]      ASSESSMENT:  1. Osteomyelitis R calcaneus. I+D 5/15/23. Cultures with enterobacter, enterococcus, bacteroides, corynebacterium from May 2023.  Repeat debridement 6/1/23 included medial wound into 1st metatarsal. Cultures with enterococcus avium and corynebacterium. These are generally benign skin ariel, but can sometimes be pathogens. Corynebacterium often resistant to most antibiotics, but vancomycin and daptomycin treat this.   2. L heel wound to soft tissues  3. S/p R TMA 4/27/23. Cultures MSSA (not MRSA)  4. DM  5. H/o remote penicillin allergy. Has tolerated pip/tazo.   6. H/o MRSA in 2019. Negative May 2023.   7. DVT R LE occurred while on anticoagulation, dose now increased.     PLAN:  Zosyn 6 weeks beyond surgery 6/1/23, so last day is 7/13/23  See how wound looks tomorrow at Dr. Spears's visit, would potentially add vancomycin IV  Return 3 weeks.     ADDENDUM: noted there was still tunneling at R heel into bone as of Dr. Spears's visit. Plan daptomycin 500mg IV daily, tentative 4 weeks. Orders called to [facility] [phone], I ultimately reached voicemail. Add CK to weekly labs, hold simvastatin while on daptomycin.      Wagner Cr MD  [facility]   Clinic phone: [phone]   Clinic fax: [phone]    ______________________________________________________________________    SUBJECTIVE / INTERVAL HISTORY: Ever Crane returns for follow up of osteomyelitis of right foot.     Has surgery 6/1/23, feet debrided, including down to bone at 1st MT. He is at [facility], on IV zosyn. Has been tolerating this. Has chronic loose stools, unchanged.     Pain at bottom of foot. Diagnosed with DVT this past week. Wound vac currently off. Sees Dr. Spears tomorrow. His wife accompanies him. There was concerned about worsening appearance of leg/foot  this past week, seems improved as of last check.     ROS: All other systems negative except as listed above.      Current Outpatient Medications:      acetaminophen (TYLENOL) 325 MG tablet, 1000 mg BID scheduled and 650 mg bid prn, Disp: , Rfl:      albuterol (PROAIR HFA/PROVENTIL HFA/VENTOLIN HFA) 108 (90 Base) MCG/ACT inhaler, Inhale 2 puffs into the lungs 2 times daily as needed, Disp: , Rfl:      clopidogrel (PLAVIX) 75 MG tablet, Take 1 tablet (75 mg) by mouth daily Start taking medication the day after the procedure., Disp: 90 tablet, Rfl: 1     dulaglutide (TRULICITY) 0.75 MG/0.5ML pen, Inject 0.75 mg Subcutaneous every 7 days, Disp: , Rfl:      furosemide (LASIX) 40 MG tablet, Take 40 mg by mouth daily, Disp: , Rfl:      gabapentin (NEURONTIN) 100 MG capsule, Take 200 mg by mouth 2 times daily, Disp: , Rfl:      glipiZIDE (GLUCOTROL) 10 MG tablet, Take 2.5 mg by mouth 2 times daily (before meals), Disp: , Rfl:      ketoconazole (NIZORAL) 2 % external shampoo, Apply topically twice a week Mon and Fri., Disp: , Rfl:      losartan (COZAAR) 25 MG tablet, Take 12.5 mg by mouth daily, Disp: , Rfl:      mineral oil-hydrophilic petrolatum (AQUAPHOR) external ointment, Apply topically 2 times daily, Disp: , Rfl:      omeprazole (PRILOSEC) 20 MG CR capsule, Take 20 mg by mouth daily, Disp: , Rfl:      oxyCODONE (ROXICODONE) 5 MG tablet, Take 1 tablet (5 mg) by mouth every 6 hours as needed for moderate to severe pain, Disp: 12 tablet, Rfl: 0     piperacillin-tazobactam (ZOSYN) 3-0.375 GM vial, Inject 3.375 g into the vein every 8 hours for 42 days 4 hour infusion, Disp: 1890 mL, Rfl: 0     polyethylene glycol (MIRALAX) 17 g packet, Take 1 packet by mouth daily, Disp: , Rfl:      rivaroxaban ANTICOAGULANT (XARELTO) 15 MG TABS tablet, Take 15 mg by mouth 2 times daily X 21 days, then change to 20mg/day, Disp: , Rfl:      senna-docusate (SENOKOT-S/PERICOLACE) 8.6-50 MG tablet, Take 1 tablet by mouth 2 times daily And BID  PRN, Disp: , Rfl:      simvastatin (ZOCOR) 40 MG tablet, Take 40 mg by mouth At Bedtime, Disp: , Rfl:      spironolactone (ALDACTONE) 25 MG tablet, Take 25 mg by mouth daily, Disp: , Rfl:      tamsulosin (FLOMAX) 0.4 MG capsule, Take 0.4 mg by mouth daily, Disp: , Rfl:       OBJECTIVE:  BP (!) 142/60 (BP Location: Left arm, Patient Position: Sitting, Cuff Size: Adult Regular)   Pulse 59   SpO2 96%       GEN: No acute distress.  In wheelchair.   RESPIRATORY:  Normal breathing pattern.   CARDIOVASCULAR:  Regular rate and rhythm.   ABDOMEN:  Soft, normal bowel sounds, non-tender, no masses, no organomegaly.  EXTREMITIES: + LE edema, R LE wrapped, there is serous drainage at medial aspect of R foot.   SKIN/HAIR/NAILS:  No rashes  PICC R UE    Recent photos sent from TCU              Pertinent labs:  Reviewed current    MICROBIOLOGY DATA:  Enterococcus avium, corynebacterium last cultures 6/1/23.     MRSA screen negative at last visit    RADIOLOGY:

## 2023-06-22 ENCOUNTER — LAB REQUISITION (OUTPATIENT)
Dept: LAB | Facility: CLINIC | Age: 78
End: 2023-06-22
Payer: MEDICARE

## 2023-06-22 ENCOUNTER — OFFICE VISIT (OUTPATIENT)
Dept: VASCULAR SURGERY | Facility: CLINIC | Age: 78
End: 2023-06-22
Attending: PODIATRIST
Payer: MEDICARE

## 2023-06-22 VITALS — HEART RATE: 62 BPM | DIASTOLIC BLOOD PRESSURE: 64 MMHG | SYSTOLIC BLOOD PRESSURE: 146 MMHG | OXYGEN SATURATION: 94 %

## 2023-06-22 DIAGNOSIS — L89.613 PRESSURE ULCER OF RIGHT HEEL, STAGE 3 (H): ICD-10-CM

## 2023-06-22 DIAGNOSIS — L97.421 NON-PRESSURE CHRONIC ULCER OF LEFT HEEL AND MIDFOOT LIMITED TO BREAKDOWN OF SKIN (H): ICD-10-CM

## 2023-06-22 DIAGNOSIS — L97.421 ULCER OF LEFT HEEL AND MIDFOOT, LIMITED TO BREAKDOWN OF SKIN (H): ICD-10-CM

## 2023-06-22 DIAGNOSIS — L97.514 ULCER OF RIGHT FOOT WITH NECROSIS OF BONE (H): Primary | ICD-10-CM

## 2023-06-22 PROCEDURE — 11047 DBRDMT BONE EACH ADDL: CPT | Performed by: PODIATRIST

## 2023-06-22 PROCEDURE — 11046 DBRDMT MUSC&/FSCA EA ADDL: CPT | Performed by: PODIATRIST

## 2023-06-22 PROCEDURE — 11043 DBRDMT MUSC&/FSCA 1ST 20/<: CPT | Performed by: PODIATRIST

## 2023-06-22 PROCEDURE — 11045 DBRDMT SUBQ TISS EACH ADDL: CPT | Performed by: PODIATRIST

## 2023-06-22 PROCEDURE — 11044 DBRDMT BONE 1ST 20 SQ CM/<: CPT | Mod: 58 | Performed by: PODIATRIST

## 2023-06-22 PROCEDURE — 11047 DBRDMT BONE EACH ADDL: CPT | Mod: 58 | Performed by: PODIATRIST

## 2023-06-22 PROCEDURE — 11044 DBRDMT BONE 1ST 20 SQ CM/<: CPT | Performed by: PODIATRIST

## 2023-06-22 PROCEDURE — 11042 DBRDMT SUBQ TIS 1ST 20SQCM/<: CPT | Performed by: PODIATRIST

## 2023-06-22 ASSESSMENT — PAIN SCALES - GENERAL: PAINLEVEL: MODERATE PAIN (4)

## 2023-06-22 NOTE — PATIENT INSTRUCTIONS
Important lnstructions        WEIGHT BEARING STATUS: You are to remain NON WEIGHT BEARING on your right foot. NON WEIGHT BEARING MEANS NO PRESSURE ON YOUR FOOT OR HEEL AT ANY TIME FOR ANY REASON!    2. OFFLOADING DEVICE: Must use a A WHEELCHAIR at all times! (do not use affected foot to push wheelchair)    3. STABILIZATION DEVICE: Use a PRAFO BOOT BILATERALLY. You will need to WEAR THIS AT ALL TIMES EVEN WHILE IN BED.     4. ELEVATE: Elevating your leg means laying with your head on a pillow and your foot ABOVE YOUR HEART.     5. DO NOT MOVE YOUR FOOT.  There is a risk of worsening the wound or incision. To give yourself a higher chance of healing, please DO NOT swing foot back and forth and wiggle foot/toes especially when inside a stabilization device. Limited movement is allowable with therapy as recommended by the doctor.     6. TAKE A PROTEIN SHAKE TWICE A DAY.  (For ex: Boost, Ensure, Glucerna)      Dressing Change lnstructions:    LEFT HEEL & RIGHT FOOT WOUND:    - Dressing Application of  Endoform for left heel wound:    1. Endoform should be cut to the size of the wound.  It should touch the edges of the ulcer. It is okay if it overlap the edges a small amount.  Then, moisten the Endoform with saline.(If it is easier for you, you can moisten it before laying it in the wound)    2. Cover the wound with Endoform, followed by dry gauze, and secure with roll gauze if needed.     3. Endoform is naturally used by the body over time so you may not find it in the wound when you change your dressing.  If you do find some, gently cleanse the wound with saline. Do not remove the remaining Endoform, which may appear as an off-white to max gel, just add Endoform on top.  Usually, more Endoform will need to be added every 5-7 days.     4. Change your top dressing every 1-2 days or as needed depending on drainage.    -Endoform is a collagen dressing created from ovine (sheep) fore-stomach tissue. The collagen  extracellular matrix transforms into a soft conforming sheet, which is naturally incorporated into the wound over time.  Collagen dressings are used to stimulate wound healing.   ,      RIGHT HEEL WOUND:          Standard - Wound Vac Instructions    1. 3x weekly and as needed cleanse the area with NS    2. Pat dry    3. Apply Cavilon no sting barrier wipe to the skin surrounding the wound to protect from drainage/maceration    4. Apply drape around incision. Cut strip of drape and apply to skin if bridging is needed; plan this area in advance; should not be over bony prominence     5. Cut the foam to fit the area of the incision    6. Cut narrow strip of foam if bridging    7. Cover foam with drape to obtain air tight seal    8. Cut opening the size of a quarter for where the suction pad will be applied    9. Apply Suction pad    10. 125mmHG suction continuous    [employer] can be reached at [phone], 24 hours a day 7 days a week     - Back up plan in place:     If the negative pressure wound therapy malfunctions or unable to maintain seal: dressing must be removed and reapplied within 2 hours of the incident. If unable to reapply negative pressure wound dressing, place Normal Saline moistened gauze in wound bed and cover with appropriate dressing to keep wound bed moist.  Change wet-to-dry dressing two times a day until healthcare staff can re-implement negative pressure therapy. Change canister at least weekly.  [employer] can be reached at [phone], 24 hours a day 7 days a week    Information on Vacuum-Assisted Closure of a Wound  Vacuum-assisted closure (VAC) of a wound is a type of treatment to help wounds heal. It s also known as negative pressure wound therapy. During the treatment, a device lowers air pressure on the wound. This can help the wound heal more quickly.  Understanding the wound VAC system  A wound VAC system has several parts. A foam or gauze dressing is put directly  on the wound. The dressing is changed every 24 to 72 hours. An adhesive film covers and seals the dressing and wound. A drainage tube leads from under the adhesive film and connects to a portable vacuum pump. This pump removes air pressure over the wound. It may do this constantly. Or it may do it in cycles. During the treatment, you ll need to carry the portable pump everywhere you go.  Why wound VAC is used  You might need this therapy for a recent traumatic wound. Or you may need it for a chronic wound. This is a wound that does not heal the way it should over time. This can happen with wounds in people who have diabetes. You may need a wound VAC if you ve had a recent skin graft. And you may need a wound VAC for a large wound. Large wounds can take a longer time to heal.  A wound vacuum system may help your wound heal more quickly by:  Draining extra fluid from the wound  Reducing swelling  Reducing bacteria in the wound  Keeping your wound moist and warm  Helping draw together wound edges  Increasing blood flow to your wound  Decreasing inflammation  Wound VAC offers some other advantages over other types of wound care. It may decrease your overall discomfort. The dressings usually need to be changed less often. And they may be easier to keep in position.         It IS NOT ok to get your wound wet in the bath or shower    SEEK MEDICAL CARE IF:  You have an increase in swelling, pain, or redness around the wound.  You have an increase in the amount of pus coming from the wound.  There is a bad smell coming from the wound.  The wound appears to be worsening/enlarging  You have a fever greater than 101.5 F      It is ok to continue current wound care treatment/products for the next 2-3 days until new wound care supplies are ordered and arrive. If longer than this please contact our office at [phone].        We want to hear from you!   In the next few weeks, you should receive a call or email to complete a  survey about your visit at [facility]. Please help us improve your appointment experience by letting us know how we did today. We strive to make your experience good and value any ways in which we could do better.      We value your input and suggestions.    Thank you for choosing the [facility]!

## 2023-06-22 NOTE — PROGRESS NOTES
FOOT AND ANKLE SURGERY/PODIATRY Progress Note      ASSESSMENT:   Ulceration right foot  Osteomyelitis calcaneus right   Ulceration right heel  Ulceration left heel        TREATMENT:  -I discussed with the patient and his sister today that the right foot ulceration is improved from the previous visit.  We will discontinue use of the wound VAC at this location and begin use of endoform.  I also recommend he continue with endoform along the posterior left heel ulceration he continues to have increased depth along the central aspect of the right heel ulceration and I recommend continued use of the wound VAC at this location.    -Discussed importance of nonweightbearing on the right foot.  PRAFO boots bilateral feet.     -After discussion of risk factors, nursing staff removed dressing, cleansed wound and consent obtained 2% Lidocaine HCL jelly was applied, under clean conditions, the right foot ulceration(s) were debrided using currette.  Devitalized and nonviable tissue, along with any fibrin and slough, was removed to improve granulation tissue formation, stimulate wound healing, decrease overall bacteria load, disrupt biofilm formation and decrease edge senescence. Wound drainage was scant No. Total excisional debridement was 81 sq cm into the bone with a depth of 0.3 cm.   Ulcers were improved afterwards and .  Measures were as noted on the flow sheet. A gauze dressing was applied.      -After discussion of risk factors, nursing staff removed dressing, cleansed wound and consent obtained 2% Lidocaine HCL jelly was applied, under clean conditions, the right heel ulceration(s) were debrided using currette.  Devitalized and nonviable tissue, along with any fibrin and slough, was removed to improve granulation tissue formation, stimulate wound healing, decrease overall bacteria load, disrupt biofilm formation and decrease edge senescence. Wound drainage was scant No. Total excisional debridement was 31 sq cm into  the muscle/fascia with a depth of 0.5 cm.   Ulcers were improved afterwards and .  Measures were as noted on the flow sheet. A gauze dressing was applied.      -After discussion of risk factors, nursing staff removed dressing, cleansed wound and consent obtained 2% Lidocaine HCL jelly was applied, under clean conditions, the left heel ulceration(s) were debrided using currette.  Devitalized and nonviable tissue, along with any fibrin and slough, was removed to improve granulation tissue formation, stimulate wound healing, decrease overall bacteria load, disrupt biofilm formation and decrease edge senescence. Wound drainage was scant No. Total excisional debridement was 11.52 sq cm into the subcutaneous tissue with a depth of 0.2 cm.   Ulcers were improved afterwards and .  Measures were as noted on the flow sheet. Collagen with a gauze dresssing was applied. He will continue to apply Collagen with a gauze dresssing as directed.    -He will follow-up in 2 weeks    Miguelangel Spears DPM  [facility]      HPI: Ever Crane was seen again today for bilateral heel ulcers, right foot ulceration. Patient was seen by ID yesterday. He continues to remain non-weight bearing on the right foot, and states that the wound vac has not been applied in 10 days.       Past Medical History:   Diagnosis Date     A-fib (H)      MESHA (acute kidney injury) (H)      Anemia      Atopic keratoconjunctivitis      Atrial fibrillation (H)     Brian Moe: 8/2011 Cardioversion; CHADS2 VASC = 5; he is on warfarin and sotalol      Atrial flutter (H)      Mcgarry's esophagus 01/01/2012    per note of Dr. Tavo Mike of [facility]     Bilateral lower leg cellulitis 08/31/2018     BPH (benign prostatic hyperplasia)      Candidiasis of perineum 01/03/2018     Carotid stenosis      Carotid stenosis, asymptomatic, right      Cellulitis      CHF (congestive heart failure) (H)      Cholecystitis, acute 08/18/2019     Chronic  systolic heart failure (H)      Chronic venous stasis dermatitis      Closed nondisplaced intertrochanteric fracture of left femur with routine healing, subsequent encounter 05/18/2022     Clostridium difficile colitis      COPD (chronic obstructive pulmonary disease) (H)      Coronary artery disease due to lipid rich plaque 2000    CABG x2     Coronary atherosclerosis      Diabetes (H)      Diabetic ulcer of both feet (H) 10/31/2017     Dyslexia      Dyslipidemia, goal LDL below 70 2000     Epistaxis      Essential hypertension      Gangrene of left foot (H)      GERD (gastroesophageal reflux disease)      HLD (hyperlipidemia)      HTN (hypertension)      Hyponatremia      Ischemic heart disease      Lymphedema      Mild cognitive impairment      MRSA (methicillin resistant Staphylococcus aureus)      Neuropathy      Non-STEMI (non-ST elevated myocardial infarction) (H)      Occlusion and stenosis of right carotid artery      Osteoarthrosis      Osteomyelitis of ankle and foot (H)      Other atopic dermatitis      PAD (peripheral artery disease) (H)      Peripheral vascular disease (H)      Pneumonia 06/26/2018     Polyneuropathy due to type 2 diabetes mellitus (H)      Pressure ulcer, heel     Bilateral     Renal insufficiency      Type 2 diabetes mellitus, without long-term current use of insulin (H)      Ulcer of right foot (H)      Unable to function independently 11/13/2017       Past Surgical History:   Procedure Laterality Date     AMPUTATE FOOT Left 11/05/2017    Procedure: LEFT TRANSMETATARSAL AMPUTATION;  Surgeon: Ever Wick MD;  [place] [facility] [place]:      AMPUTATE FOOT Right 4/27/2023    Procedure: Transmetatarsal amputation right foot;  Surgeon: Miguelangel Spears DPM;  [facility] [facility] [place]     AMPUTATE FOOT Right 5/15/2023    Procedure: partial calcanectomy;  Surgeon: Miguelangel Spears DPM;  Location: [facility]     BYPASS GRAFT ARTERY CORONARY N/A 03/06/2000     SVG to OM1, SVG to PDA     BYPASS GRAFT ARTERY CORONARY N/A 10/04/2018    redo CABG due to graft failure     CABG MEASURES GRP       CARDIOVERSION  08/25/2011     CV CORONARY ANGIOGRAM N/A 10/01/2018    Procedure: Coronary Angiogram;  Surgeon: Miki Mac MD;  Location: [facility];  Service:      INCISION AND DRAINAGE FOOT, COMBINED Right 5/15/2023    Procedure: INCISION AND DRAINAGE, right heel with,;  Surgeon: Miguelangel Spears DPM;  [place] [facility] [place]     INCISION AND DRAINAGE FOOT, COMBINED Bilateral 6/1/2023    Procedure: INCISION AND DRAINAGE, right foot with debridement of ulceration bilateral heels;  Surgeon: Miguelangel Speras DPM;  [place] [facility] [place]     INGUINAL HERNIA REPAIR Bilateral 1969    and 1979     IR EXTREMITY ANGIOGRAM BILATERAL  11/3/2017     IR LOWER EXTREMITY ANGIOGRAM LEFT  3/10/2023     IR LOWER EXTREMITY ANGIOGRAM RIGHT  1/24/2023     LAPAROSCOPIC CHOLECYSTECTOMY N/A 08/18/2019    Procedure: CHOLECYSTECTOMY, LAPAROSCOPIC;  Surgeon: Stewart Fountain MD;  Location: [facility] OR;  Service: General     LENGTHEN TENDON ACHILLES Right 4/27/2023    Procedure: with Achilles tendon lengthening, debridement of right heel ulceration.;  Surgeon: Miguelangel Spears DPM;  [place] [facility] [place]       Allergies   Allergen Reactions     Cranberry Extract Itching and Rash     Doxycycline Rash     Latex Rash     Penicillin V Rash     Reaction occurred as a child. Patient tolerated Zosyn 6/2018, Cefazolin 10/2018, and has also tolerated Augmentin.     Penicillins Rash     Reaction occurred as a child. Patient tolerated Zosyn 6/2018, Cefazolin 10/2018, and has also tolerated Augmentin.         Current Outpatient Medications:      acetaminophen (TYLENOL) 325 MG tablet, 1000 mg BID scheduled and 650 mg bid prn, Disp: , Rfl:      albuterol (PROAIR HFA/PROVENTIL HFA/VENTOLIN HFA) 108 (90 Base) MCG/ACT inhaler, Inhale 2 puffs into the lungs 2 times  daily as needed, Disp: , Rfl:      clopidogrel (PLAVIX) 75 MG tablet, Take 1 tablet (75 mg) by mouth daily Start taking medication the day after the procedure., Disp: 90 tablet, Rfl: 1     dulaglutide (TRULICITY) 0.75 MG/0.5ML pen, Inject 0.75 mg Subcutaneous every 7 days, Disp: , Rfl:      furosemide (LASIX) 40 MG tablet, Take 40 mg by mouth daily, Disp: , Rfl:      gabapentin (NEURONTIN) 100 MG capsule, Take 200 mg by mouth 2 times daily, Disp: , Rfl:      glipiZIDE (GLUCOTROL) 10 MG tablet, Take 2.5 mg by mouth 2 times daily (before meals), Disp: , Rfl:      ketoconazole (NIZORAL) 2 % external shampoo, Apply topically twice a week Mon and Fri., Disp: , Rfl:      losartan (COZAAR) 25 MG tablet, Take 12.5 mg by mouth daily, Disp: , Rfl:      mineral oil-hydrophilic petrolatum (AQUAPHOR) external ointment, Apply topically 2 times daily, Disp: , Rfl:      omeprazole (PRILOSEC) 20 MG CR capsule, Take 20 mg by mouth daily, Disp: , Rfl:      oxyCODONE (ROXICODONE) 5 MG tablet, Take 1 tablet (5 mg) by mouth every 6 hours as needed for moderate to severe pain, Disp: 12 tablet, Rfl: 0     piperacillin-tazobactam (ZOSYN) 3-0.375 GM vial, Inject 3.375 g into the vein every 8 hours for 42 days 4 hour infusion, Disp: 1890 mL, Rfl: 0     polyethylene glycol (MIRALAX) 17 g packet, Take 1 packet by mouth daily, Disp: , Rfl:      rivaroxaban ANTICOAGULANT (XARELTO) 15 MG TABS tablet, Take 15 mg by mouth 2 times daily X 21 days, then change to 20mg/day, Disp: , Rfl:      senna-docusate (SENOKOT-S/PERICOLACE) 8.6-50 MG tablet, Take 1 tablet by mouth 2 times daily And BID PRN, Disp: , Rfl:      simvastatin (ZOCOR) 40 MG tablet, Take 40 mg by mouth At Bedtime, Disp: , Rfl:      spironolactone (ALDACTONE) 25 MG tablet, Take 25 mg by mouth daily, Disp: , Rfl:      tamsulosin (FLOMAX) 0.4 MG capsule, Take 0.4 mg by mouth daily, Disp: , Rfl:     Review of Systems - 10 point Review of Systems is negative except for bilateral foot ulcers  "which is noted in HPI.      OBJECTIVE:  BP (!) 146/64   Pulse 62   SpO2 94%   General appearance: Patient is alert and fully cooperative with history & exam.  No sign of distress is noted during the visit.    Vascular: Dorsalis pedis non-palpableBilateral.  Dermatologic:    VASC Wound Right heel (Active)   Pre Size Length 6 06/22/23 0900   Pre Size Width 5.3 06/22/23 0900   Pre Size Depth 0.5 06/22/23 0900   Pre Total Sq cm 31.8 06/22/23 0900   Post Size Length 9 01/13/23 1300   Post Size Width 9 01/13/23 1300   Post Size Depth 0.1 01/13/23 1300   Post Total Sq cm 81 01/13/23 1300   Description eschar 04/05/23 1000       VASC Wound Left heel (Active)   Pre Size Length 3.2 06/22/23 0900   Pre Size Width 3.6 06/22/23 0900   Pre Size Depth 0.2 06/22/23 0900   Pre Total Sq cm 11.52 06/22/23 0900   Post Size Length 7 01/13/23 1300   Post Size Width 6 01/13/23 1300   Post Size Depth 0.1 01/13/23 1300   Post Total Sq cm 42 01/13/23 1300       VASC Wound Right Distal foot (Active)   Pre Size Length 4.5 06/22/23 0900   Pre Size Width 18 06/22/23 0900   Pre Size Depth 0.3 06/22/23 0900   Pre Total Sq cm 81 06/22/23 0900       Incision/Surgical Site 06/01/23 Bilateral Foot (Active)   Incision Assessment [facility] 06/01/23 1045   Vida-Incision Assessment [facility] 06/01/23 1008   Drainage Description [facility] 06/01/23 1045   Dressing Intervention Clean, dry, intact 06/01/23 1045   Granular tissue distal right midfoot ulceration and left heel ulceration.  Granular tissue right heel ulceration with increased depth centrally.  No erythema bilateral feet.  Neurologic: Diminished to light touch Bilateral.  Musculoskeletal: TMA right.    Imaging:     POC US Guidance Needle Placement    Result Date: 6/1/2023  Ultrasound was performed as guidance to an anesthesia procedure.  Click \"PACS images\" hyperlink below to view any stored images.  For specific procedure details, view procedure note authored by anesthesia.         Picture:                     "

## 2023-06-23 ENCOUNTER — TELEPHONE (OUTPATIENT)
Dept: INFECTIOUS DISEASES | Facility: CLINIC | Age: 78
End: 2023-06-23

## 2023-06-23 LAB
ALBUMIN SERPL BCG-MCNC: 3.6 G/DL (ref 3.5–5.2)
ALP SERPL-CCNC: 68 U/L (ref 40–129)
ALT SERPL W P-5'-P-CCNC: <5 U/L (ref 0–70)
ANION GAP SERPL CALCULATED.3IONS-SCNC: 11 MMOL/L (ref 7–15)
AST SERPL W P-5'-P-CCNC: 9 U/L (ref 0–45)
BASOPHILS # BLD AUTO: 0.1 10E3/UL (ref 0–0.2)
BASOPHILS NFR BLD AUTO: 1 %
BILIRUB SERPL-MCNC: 0.5 MG/DL
BUN SERPL-MCNC: 13 MG/DL (ref 8–23)
CALCIUM SERPL-MCNC: 9.4 MG/DL (ref 8.8–10.2)
CHLORIDE SERPL-SCNC: 104 MMOL/L (ref 98–107)
CREAT SERPL-MCNC: 1.17 MG/DL (ref 0.67–1.17)
CRP SERPL-MCNC: 60.98 MG/L
DEPRECATED HCO3 PLAS-SCNC: 26 MMOL/L (ref 22–29)
EOSINOPHIL # BLD AUTO: 0.6 10E3/UL (ref 0–0.7)
EOSINOPHIL NFR BLD AUTO: 8 %
ERYTHROCYTE [DISTWIDTH] IN BLOOD BY AUTOMATED COUNT: 15.8 % (ref 10–15)
GFR SERPL CREATININE-BSD FRML MDRD: 64 ML/MIN/1.73M2
GLUCOSE SERPL-MCNC: 108 MG/DL (ref 70–99)
HCT VFR BLD AUTO: 31.6 % (ref 40–53)
HGB BLD-MCNC: 9.8 G/DL (ref 13.3–17.7)
IMM GRANULOCYTES # BLD: 0 10E3/UL
IMM GRANULOCYTES NFR BLD: 0 %
LYMPHOCYTES # BLD AUTO: 1.2 10E3/UL (ref 0.8–5.3)
LYMPHOCYTES NFR BLD AUTO: 16 %
MCH RBC QN AUTO: 25.2 PG (ref 26.5–33)
MCHC RBC AUTO-ENTMCNC: 31 G/DL (ref 31.5–36.5)
MCV RBC AUTO: 81 FL (ref 78–100)
MONOCYTES # BLD AUTO: 0.5 10E3/UL (ref 0–1.3)
MONOCYTES NFR BLD AUTO: 7 %
NEUTROPHILS # BLD AUTO: 4.9 10E3/UL (ref 1.6–8.3)
NEUTROPHILS NFR BLD AUTO: 68 %
NRBC # BLD AUTO: 0 10E3/UL
NRBC BLD AUTO-RTO: 0 /100
PLATELET # BLD AUTO: 219 10E3/UL (ref 150–450)
POTASSIUM SERPL-SCNC: 4 MMOL/L (ref 3.4–5.3)
PROT SERPL-MCNC: 7 G/DL (ref 6.4–8.3)
RBC # BLD AUTO: 3.89 10E6/UL (ref 4.4–5.9)
SODIUM SERPL-SCNC: 141 MMOL/L (ref 136–145)
WBC # BLD AUTO: 7.2 10E3/UL (ref 4–11)

## 2023-06-23 PROCEDURE — 85025 COMPLETE CBC W/AUTO DIFF WBC: CPT | Mod: ORL | Performed by: NURSE PRACTITIONER

## 2023-06-23 PROCEDURE — P9604 ONE-WAY ALLOW PRORATED TRIP: HCPCS | Mod: ORL | Performed by: NURSE PRACTITIONER

## 2023-06-23 PROCEDURE — 36415 COLL VENOUS BLD VENIPUNCTURE: CPT | Mod: ORL | Performed by: NURSE PRACTITIONER

## 2023-06-23 PROCEDURE — 86140 C-REACTIVE PROTEIN: CPT | Mod: ORL | Performed by: NURSE PRACTITIONER

## 2023-06-23 PROCEDURE — 80053 COMPREHEN METABOLIC PANEL: CPT | Mod: ORL | Performed by: NURSE PRACTITIONER

## 2023-06-23 RX ORDER — DAPTOMYCIN 50 MG/ML
500 INJECTION, POWDER, LYOPHILIZED, FOR SOLUTION INTRAVENOUS DAILY
Qty: 280 ML | Refills: 0
Start: 2023-06-23 | End: 2023-07-12

## 2023-06-23 NOTE — TELEPHONE ENCOUNTER
I called LM with the below orders;  Daptomycin 500mg daily 4 weeks   Weekly CK level   Hold simvastatin while on dapto     Wagner Cr MD

## 2023-06-23 NOTE — TELEPHONE ENCOUNTER
Trang from [facility] calling stating that the provider there received an email from Dr Cr to change the IV abx. I do not see notes on this and she states they need orders. I will check and call her back at [phone]

## 2023-06-27 ENCOUNTER — TRANSITIONAL CARE UNIT VISIT (OUTPATIENT)
Dept: GERIATRICS | Facility: CLINIC | Age: 78
End: 2023-06-27
Payer: MEDICARE

## 2023-06-27 VITALS
TEMPERATURE: 97.5 F | HEART RATE: 62 BPM | BODY MASS INDEX: 26.7 KG/M2 | RESPIRATION RATE: 24 BRPM | DIASTOLIC BLOOD PRESSURE: 67 MMHG | HEIGHT: 70 IN | OXYGEN SATURATION: 96 % | WEIGHT: 186.5 LBS | SYSTOLIC BLOOD PRESSURE: 133 MMHG

## 2023-06-27 DIAGNOSIS — M86.9 OSTEOMYELITIS OF RIGHT FOOT, UNSPECIFIED TYPE (H): ICD-10-CM

## 2023-06-27 DIAGNOSIS — Z98.890 S/P FOOT SURGERY, RIGHT: ICD-10-CM

## 2023-06-27 DIAGNOSIS — I82.451 ACUTE DEEP VEIN THROMBOSIS (DVT) OF RIGHT PERONEAL VEIN (H): Primary | ICD-10-CM

## 2023-06-27 DIAGNOSIS — I73.9 PAD (PERIPHERAL ARTERY DISEASE) (H): ICD-10-CM

## 2023-06-27 DIAGNOSIS — Z46.89 ENCOUNTER FOR MANAGEMENT OF WOUND VAC: ICD-10-CM

## 2023-06-27 PROCEDURE — 99309 SBSQ NF CARE MODERATE MDM 30: CPT | Performed by: NURSE PRACTITIONER

## 2023-06-27 NOTE — PROGRESS NOTES
[facility]  Episodic/Acute/Follow-Up  [place] MRN: [id number]. Place of Service where encounter took place:  [facility] ([facility]) [[id number]]   Chief Complaint   Patient presents with     RECHECK    HPI: Ever Crane  is a 78 year old (1945), who is being seen today for an episodic care visit. Today's concern is:    Ever seen today on routine follow-up as he [place].  Last week, deep vein thrombosis was found in the right peroneal vein and Xarelto dosing was changed in order to treat this.    Today, Ever denies any new bleeding or bruising, denies blood in his stool, and says that he does have occasional right lower extremity pain.  He denies headaches, dizziness, nausea, new shortness of breath.  He was enjoying the outdoors with his sister who is visiting today.    Past Medical and Surgical History reviewed in Epic today.  MEDICATIONS:  Current Outpatient Medications   Medication Sig Dispense Refill     acetaminophen (TYLENOL) 325 MG tablet 1000 mg BID scheduled and 650 mg bid prn       albuterol (PROAIR HFA/PROVENTIL HFA/VENTOLIN HFA) 108 (90 Base) MCG/ACT inhaler Inhale 2 puffs into the lungs 2 times daily as needed       clopidogrel (PLAVIX) 75 MG tablet Take 1 tablet (75 mg) by mouth daily Start taking medication the day after the procedure. 90 tablet 1     DAPTOmycin (CUBICIN) 500 MG injection Inject 10 mLs (500 mg) into the vein daily for 28 days 280 mL 0     dulaglutide (TRULICITY) 0.75 MG/0.5ML pen Inject 0.75 mg Subcutaneous every 7 days       furosemide (LASIX) 40 MG tablet Take 40 mg by mouth daily       gabapentin (NEURONTIN) 100 MG capsule Take 200 mg by mouth 2 times daily       glipiZIDE (GLUCOTROL) 10 MG tablet Take 2.5 mg by mouth 2 times daily (before meals)       ketoconazole (NIZORAL) 2 % external shampoo Apply topically twice a week Mon and Fri.       losartan (COZAAR) 25 MG tablet Take 12.5 mg by mouth daily       mineral oil-hydrophilic  "petrolatum (AQUAPHOR) external ointment Apply topically 2 times daily       omeprazole (PRILOSEC) 20 MG CR capsule Take 20 mg by mouth daily       piperacillin-tazobactam (ZOSYN) 3-0.375 GM vial Inject 3.375 g into the vein every 8 hours for 42 days 4 hour infusion 1890 mL 0     polyethylene glycol (MIRALAX) 17 g packet Take 1 packet by mouth daily       rivaroxaban ANTICOAGULANT (XARELTO) 15 MG TABS tablet Take 15 mg by mouth 2 times daily X 21 days, then change to 20mg/day       senna-docusate (SENOKOT-S/PERICOLACE) 8.6-50 MG tablet Take 1 tablet by mouth 2 times daily And BID PRN       simvastatin (ZOCOR) 40 MG tablet Take 40 mg by mouth At Bedtime       spironolactone (ALDACTONE) 25 MG tablet Take 25 mg by mouth daily       tamsulosin (FLOMAX) 0.4 MG capsule Take 0.4 mg by mouth daily       Objective: /67   Pulse 62   Temp 97.5  F (36.4  C)   Resp 24   Ht 1.778 m (5' 10\")   Wt 84.6 kg (186 lb 8 oz)   SpO2 96%   BMI 26.76 kg/m    Exam:  GENERAL APPEARANCE: Alert, in no distress, cooperative.   RESP: Respiratory effort good, no respiratory distress, Lung sounds clear. On RA.   CV: Auscultation of heart reveals S1, S2, rate controlled and rhythm irregular, no murmur, no rub or gallop, Edema 1+ BLE. Peripheral pulses are 2+.  PSYCH: Insight, judgement, and memory are impaired at baseline, affect and mood are happy/engaged.    Labs: Labs done in facility are in EPIC. Please refer to them using [facility] Everywhere.    ASSESSMENT/PLAN:  Acute deep vein thrombosis (DVT) of right peroneal vein (H)  S/P foot surgery, right  Osteomyelitis of right foot, unspecified type (H)  PAD (peripheral artery disease) (H)  Encounter for management of wound VAC  Acute on chronic. Ongoing.    Ever is still within the 21-day window of anticoagulation for his DVT.  He is at risk for bleeding.    Ongoing osteomyelitis and IV antibiotics which were changed last week to daptomycin per infectious disease.    Mild pain reported, " but this is manageable and patient has not utilized any oxycodone for several weeks.  Will discontinue.    Of note, wound VAC was officially discontinued during visit last week with vascular.  Follow up w/in 1-2 weeks or as needed.    Orders:  1. Discontinue Oxycodone    Electronically signed by:  Dr. Yanna Dozier, APRN CNP DNP

## 2023-06-27 NOTE — LETTER
6/27/2023        RE: Ever Crane  [address]  [address]  [place] [address]        [facility]  Episodic/Acute/Follow-Up  [place] MRN: [id number]. [facility] [place] [facility] [[id number][facility]   Chief Complaint   Patient presents with     RECHECK    HPI: Ever Crane  is a 78 year old (1945), who is being seen today for an episodic care visit. Today's concern is:    Ever seen today on routine follow-up as he continues to rehab in [facility].  Last week, deep vein thrombosis was found in the right peroneal vein and Xarelto dosing was changed in order to treat this.    Today, Ever denies any new bleeding or bruising, denies blood in his stool, and says that he does have occasional right lower extremity pain.  He denies headaches, dizziness, nausea, new shortness of breath.  He was enjoying the outdoors with his sister who is visiting today.    Past Medical and Surgical History reviewed in Epic today.  MEDICATIONS:  Current Outpatient Medications   Medication Sig Dispense Refill     acetaminophen (TYLENOL) 325 MG tablet 1000 mg BID scheduled and 650 mg bid prn       albuterol (PROAIR HFA/PROVENTIL HFA/VENTOLIN HFA) 108 (90 Base) MCG/ACT inhaler Inhale 2 puffs into the lungs 2 times daily as needed       clopidogrel (PLAVIX) 75 MG tablet Take 1 tablet (75 mg) by mouth daily Start taking medication the day after the procedure. 90 tablet 1     DAPTOmycin (CUBICIN) 500 MG injection Inject 10 mLs (500 mg) into the vein daily for 28 days 280 mL 0     dulaglutide (TRULICITY) 0.75 MG/0.5ML pen Inject 0.75 mg Subcutaneous every 7 days       furosemide (LASIX) 40 MG tablet Take 40 mg by mouth daily       gabapentin (NEURONTIN) 100 MG capsule Take 200 mg by mouth 2 times daily       glipiZIDE (GLUCOTROL) 10 MG tablet Take 2.5 mg by mouth 2 times daily (before meals)       ketoconazole (NIZORAL) 2 % external shampoo Apply topically twice a week Mon and Fri.        "losartan (COZAAR) 25 MG tablet Take 12.5 mg by mouth daily       mineral oil-hydrophilic petrolatum (AQUAPHOR) external ointment Apply topically 2 times daily       omeprazole (PRILOSEC) 20 MG CR capsule Take 20 mg by mouth daily       piperacillin-tazobactam (ZOSYN) 3-0.375 GM vial Inject 3.375 g into the vein every 8 hours for 42 days 4 hour infusion 1890 mL 0     polyethylene glycol (MIRALAX) 17 g packet Take 1 packet by mouth daily       rivaroxaban ANTICOAGULANT (XARELTO) 15 MG TABS tablet Take 15 mg by mouth 2 times daily X 21 days, then change to 20mg/day       senna-docusate (SENOKOT-S/PERICOLACE) 8.6-50 MG tablet Take 1 tablet by mouth 2 times daily And BID PRN       simvastatin (ZOCOR) 40 MG tablet Take 40 mg by mouth At Bedtime       spironolactone (ALDACTONE) 25 MG tablet Take 25 mg by mouth daily       tamsulosin (FLOMAX) 0.4 MG capsule Take 0.4 mg by mouth daily       Objective: /67   Pulse 62   Temp 97.5  F (36.4  C)   Resp 24   Ht 1.778 m (5' 10\")   Wt 84.6 kg (186 lb 8 oz)   SpO2 96%   BMI 26.76 kg/m    Exam:  GENERAL APPEARANCE: Alert, in no distress, cooperative.   RESP: Respiratory effort good, no respiratory distress, Lung sounds clear. On RA.   CV: Auscultation of heart reveals S1, S2, rate controlled and rhythm irregular, no murmur, no rub or gallop, Edema 1+ BLE. Peripheral pulses are 2+.  PSYCH: Insight, judgement, and memory are impaired at baseline, affect and mood are happy/engaged.    Labs: Labs done in facility are in EPIC. Please refer to them using [employer] Everywhere.    ASSESSMENT/PLAN:  Acute deep vein thrombosis (DVT) of right peroneal vein (H)  S/P foot surgery, right  Osteomyelitis of right foot, unspecified type (H)  PAD (peripheral artery disease) (H)  Encounter for management of wound VAC  Acute on chronic. Ongoing.    Ever is still within the 21-day window of anticoagulation for his DVT.  He is at risk for bleeding.    Ongoing osteomyelitis and IV antibiotics " which were changed last week to daptomycin per infectious disease.    Mild pain reported, but this is manageable and patient has not utilized any oxycodone for several weeks.  Will discontinue.    Of note, wound VAC was officially discontinued during visit last week with vascular.  Follow up w/in 1-2 weeks or as needed.    Orders:  1. Discontinue Oxycodone    Electronically signed by:  Dr. Yanna Dozier, KEVIN CNP DNP          Sincerely,        KEVIN Bansal CNP

## 2023-06-29 ENCOUNTER — LAB REQUISITION (OUTPATIENT)
Dept: LAB | Facility: CLINIC | Age: 78
End: 2023-06-29
Payer: MEDICARE

## 2023-06-29 DIAGNOSIS — L08.9 LOCAL INFECTION OF THE SKIN AND SUBCUTANEOUS TISSUE, UNSPECIFIED: ICD-10-CM

## 2023-06-29 DIAGNOSIS — L97.421 NON-PRESSURE CHRONIC ULCER OF LEFT HEEL AND MIDFOOT LIMITED TO BREAKDOWN OF SKIN (H): ICD-10-CM

## 2023-06-30 ENCOUNTER — TRANSFERRED RECORDS (OUTPATIENT)
Dept: HEALTH INFORMATION MANAGEMENT | Facility: CLINIC | Age: 78
End: 2023-06-30

## 2023-06-30 LAB
ALBUMIN SERPL BCG-MCNC: 3.6 G/DL (ref 3.5–5.2)
ALP SERPL-CCNC: 69 U/L (ref 40–129)
ALT SERPL W P-5'-P-CCNC: <5 U/L (ref 0–70)
ANION GAP SERPL CALCULATED.3IONS-SCNC: 15 MMOL/L (ref 7–15)
AST SERPL W P-5'-P-CCNC: 11 U/L (ref 0–45)
BASOPHILS # BLD AUTO: 0.1 10E3/UL (ref 0–0.2)
BASOPHILS NFR BLD AUTO: 1 %
BILIRUB SERPL-MCNC: 0.4 MG/DL
BUN SERPL-MCNC: 16.5 MG/DL (ref 8–23)
CALCIUM SERPL-MCNC: 9.5 MG/DL (ref 8.8–10.2)
CHLORIDE SERPL-SCNC: 105 MMOL/L (ref 98–107)
CK SERPL-CCNC: 25 U/L (ref 39–308)
CREAT SERPL-MCNC: 1.21 MG/DL (ref 0.67–1.17)
CRP SERPL-MCNC: 16.22 MG/L
DEPRECATED HCO3 PLAS-SCNC: 22 MMOL/L (ref 22–29)
EOSINOPHIL # BLD AUTO: 0.5 10E3/UL (ref 0–0.7)
EOSINOPHIL NFR BLD AUTO: 9 %
ERYTHROCYTE [DISTWIDTH] IN BLOOD BY AUTOMATED COUNT: 15.9 % (ref 10–15)
GFR SERPL CREATININE-BSD FRML MDRD: 61 ML/MIN/1.73M2
GLUCOSE SERPL-MCNC: 102 MG/DL (ref 70–99)
HCT VFR BLD AUTO: 31.3 % (ref 40–53)
HGB BLD-MCNC: 9.6 G/DL (ref 13.3–17.7)
IMM GRANULOCYTES # BLD: 0 10E3/UL
IMM GRANULOCYTES NFR BLD: 0 %
LYMPHOCYTES # BLD AUTO: 0.9 10E3/UL (ref 0.8–5.3)
LYMPHOCYTES NFR BLD AUTO: 16 %
MCH RBC QN AUTO: 24.9 PG (ref 26.5–33)
MCHC RBC AUTO-ENTMCNC: 30.7 G/DL (ref 31.5–36.5)
MCV RBC AUTO: 81 FL (ref 78–100)
MONOCYTES # BLD AUTO: 0.4 10E3/UL (ref 0–1.3)
MONOCYTES NFR BLD AUTO: 7 %
NEUTROPHILS # BLD AUTO: 3.8 10E3/UL (ref 1.6–8.3)
NEUTROPHILS NFR BLD AUTO: 67 %
NRBC # BLD AUTO: 0 10E3/UL
NRBC BLD AUTO-RTO: 0 /100
PLATELET # BLD AUTO: 226 10E3/UL (ref 150–450)
POTASSIUM SERPL-SCNC: 4.2 MMOL/L (ref 3.4–5.3)
PROT SERPL-MCNC: 7 G/DL (ref 6.4–8.3)
RBC # BLD AUTO: 3.85 10E6/UL (ref 4.4–5.9)
SODIUM SERPL-SCNC: 142 MMOL/L (ref 136–145)
WBC # BLD AUTO: 5.8 10E3/UL (ref 4–11)

## 2023-06-30 PROCEDURE — 86140 C-REACTIVE PROTEIN: CPT | Mod: ORL | Performed by: FAMILY MEDICINE

## 2023-06-30 PROCEDURE — 36415 COLL VENOUS BLD VENIPUNCTURE: CPT | Mod: ORL | Performed by: FAMILY MEDICINE

## 2023-06-30 PROCEDURE — P9603 ONE-WAY ALLOW PRORATED MILES: HCPCS | Mod: ORL | Performed by: FAMILY MEDICINE

## 2023-06-30 PROCEDURE — 80053 COMPREHEN METABOLIC PANEL: CPT | Mod: ORL | Performed by: FAMILY MEDICINE

## 2023-06-30 PROCEDURE — 82550 ASSAY OF CK (CPK): CPT | Mod: ORL | Performed by: FAMILY MEDICINE

## 2023-06-30 PROCEDURE — 85025 COMPLETE CBC W/AUTO DIFF WBC: CPT | Mod: ORL | Performed by: FAMILY MEDICINE

## 2023-07-05 NOTE — PROGRESS NOTES
[facility]  Episodic/Acute/Follow-Up  [place] MRN: [id number]. Place of Service where encounter took place[facility]  [facility] [[id number]]   Chief Complaint   Patient presents with     RECHECK    HPI: Ever Crane  is a 78 year old (1945), who is being seen today for an episodic care visit. Today's concern is:    Ever seen today on routine follow-up as he continues to rehab in [facility].  Nursing reports he continues to be noncompliant with his weightbearing status, and this is likely affecting his ability to heal.    Today, Ever says that he is tired, but everything is going well.  He denies any headache, shortness of breath, chest pain, fever, chills, and he says that the right leg pain he was having is much better.  He does note some ongoing swelling in the right leg.  He has about a week lefthe of increased Xarelto dosing until he goes back to maintenance dosing for known perineal DVT.    Past Medical and Surgical History reviewed in Epic today.  MEDICATIONS:  Current Outpatient Medications   Medication Sig Dispense Refill     piperacillin-tazobactam (ZOSYN) 3-0.375 GM vial Inject 3.375 g into the vein every 8 hours 4 hour infusion 1890 mL 0     acetaminophen (TYLENOL) 325 MG tablet 1000 mg BID scheduled and 650 mg bid prn       albuterol (PROAIR HFA/PROVENTIL HFA/VENTOLIN HFA) 108 (90 Base) MCG/ACT inhaler Inhale 2 puffs into the lungs 2 times daily as needed       clopidogrel (PLAVIX) 75 MG tablet Take 1 tablet (75 mg) by mouth daily Start taking medication the day after the procedure. 90 tablet 1     DAPTOmycin (CUBICIN) 500 MG injection Inject 10 mLs (500 mg) into the vein daily for 28 days 280 mL 0     dulaglutide (TRULICITY) 0.75 MG/0.5ML pen Inject 0.75 mg Subcutaneous every 7 days       furosemide (LASIX) 40 MG tablet Take 40 mg by mouth daily       gabapentin (NEURONTIN) 100 MG capsule Take 200 mg by mouth 2 times daily       ketoconazole (NIZORAL) 2 % external shampoo Apply  topically twice a week Mon and Fri.       losartan (COZAAR) 25 MG tablet Take 12.5 mg by mouth daily       mineral oil-hydrophilic petrolatum (AQUAPHOR) external ointment Apply topically 2 times daily       omeprazole (PRILOSEC) 20 MG CR capsule Take 20 mg by mouth daily       polyethylene glycol (MIRALAX) 17 g packet Take 1 packet by mouth daily       rivaroxaban ANTICOAGULANT (XARELTO) 15 MG TABS tablet Take 15 mg by mouth 2 times daily X 21 days, then change to 20mg/day       senna-docusate (SENOKOT-S/PERICOLACE) 8.6-50 MG tablet Take 1 tablet by mouth 2 times daily And BID PRN       simvastatin (ZOCOR) 40 MG tablet Take 40 mg by mouth At Bedtime       spironolactone (ALDACTONE) 25 MG tablet Take 25 mg by mouth daily       tamsulosin (FLOMAX) 0.4 MG capsule Take 0.4 mg by mouth daily       Objective: BP (!) 145/71   Pulse 81   Temp 98  F (36.7  C)   Resp 18   Wt 85.9 kg (189 lb 6.4 oz)   SpO2 96%   BMI 27.18 kg/m     BGs:    Exam:  GENERAL APPEARANCE: Alert, in no distress, cooperative.   RESP: Respiratory effort good, no respiratory distress.On RA.   CV: Edema 1+ BLE, right slightly > left.. Rubor present.  PSYCH: Insight, judgement, and memory are impaired at baseline, affect and mood are happy/engaged.    Labs: Labs done in facility are in EPIC. Please refer to them using [employer] Everywhere.    ASSESSMENT/PLAN:  S/P foot surgery, right  Osteomyelitis of right foot, unspecified type (H)  PAD (peripheral artery disease) (H)  Acute deep vein thrombosis (DVT) of right peroneal vein (H)  Polyneuropathy due to type 2 diabetes mellitus (H)  Acute on chronic. Ongoing.    Ever doing well status post most recent surgery, except for the development of provoked DVT which has been treated with treatment ongoing through next week.  He will remain on maintenance therapy after that.  There are no signs or symptoms of bleeding.    Ever will follow-up with Miguelangel Spears on 7/7.    Pain appears well  controlled.    Diabetes also appears well controlled, and will trial a discontinuation of glipizide given there is a risk of hypoglycemia and overall lack of efficacy with this drug in older adults.  Follow up w/in 1-2 weeks or as needed.    Orders:  1. Discontinue Glipizide    Electronically signed by:  Dr. Yanna Dozier, APRN CNP DNP

## 2023-07-06 ENCOUNTER — LAB REQUISITION (OUTPATIENT)
Dept: LAB | Facility: CLINIC | Age: 78
End: 2023-07-06
Payer: MEDICARE

## 2023-07-06 ENCOUNTER — TRANSITIONAL CARE UNIT VISIT (OUTPATIENT)
Dept: GERIATRICS | Facility: CLINIC | Age: 78
End: 2023-07-06
Payer: MEDICARE

## 2023-07-06 VITALS
BODY MASS INDEX: 27.18 KG/M2 | WEIGHT: 189.4 LBS | HEART RATE: 81 BPM | DIASTOLIC BLOOD PRESSURE: 71 MMHG | TEMPERATURE: 98 F | OXYGEN SATURATION: 96 % | SYSTOLIC BLOOD PRESSURE: 145 MMHG | RESPIRATION RATE: 18 BRPM

## 2023-07-06 DIAGNOSIS — L08.9 LOCAL INFECTION OF THE SKIN AND SUBCUTANEOUS TISSUE, UNSPECIFIED: ICD-10-CM

## 2023-07-06 DIAGNOSIS — L97.421 NON-PRESSURE CHRONIC ULCER OF LEFT HEEL AND MIDFOOT LIMITED TO BREAKDOWN OF SKIN (H): ICD-10-CM

## 2023-07-06 DIAGNOSIS — M86.9 OSTEOMYELITIS OF RIGHT FOOT, UNSPECIFIED TYPE (H): ICD-10-CM

## 2023-07-06 DIAGNOSIS — M86.171 ACUTE OSTEOMYELITIS OF RIGHT CALCANEUS (H): ICD-10-CM

## 2023-07-06 DIAGNOSIS — E11.42 POLYNEUROPATHY DUE TO TYPE 2 DIABETES MELLITUS (H): ICD-10-CM

## 2023-07-06 DIAGNOSIS — I73.9 PAD (PERIPHERAL ARTERY DISEASE) (H): ICD-10-CM

## 2023-07-06 DIAGNOSIS — I82.451 ACUTE DEEP VEIN THROMBOSIS (DVT) OF RIGHT PERONEAL VEIN (H): ICD-10-CM

## 2023-07-06 DIAGNOSIS — Z98.890 S/P FOOT SURGERY, RIGHT: Primary | ICD-10-CM

## 2023-07-06 PROCEDURE — 99309 SBSQ NF CARE MODERATE MDM 30: CPT | Performed by: NURSE PRACTITIONER

## 2023-07-06 NOTE — LETTER
7/6/2023        RE: Ever Crane  [address]  [address]  [place] [address]        [facility]  Episodic/Acute/Follow-Up  [place] MRN: [id number]. Place of Service where encounter took place:  [facility] [place] [[id number]]   Chief Complaint   Patient presents with     RECHECK    HPI: Ever Crane  is a 78 year old (1945), who is being seen today for an episodic care visit. Today's concern is:    Ever seen today on routine follow-up as he continues to rehab in [facility].  Nursing reports he continues to be noncompliant with his weightbearing status, and this is likely affecting his ability to heal.    Today, Ever says that he is tired, but everything is going well.  He denies any headache, shortness of breath, chest pain, fever, chills, and he says that the right leg pain he was having is much better.  He does note some ongoing swelling in the right leg.  He has about a week lefthe of increased Xarelto dosing until he goes back to maintenance dosing for known perineal DVT.    Past Medical and Surgical History reviewed in Epic today.  MEDICATIONS:  Current Outpatient Medications   Medication Sig Dispense Refill     piperacillin-tazobactam (ZOSYN) 3-0.375 GM vial Inject 3.375 g into the vein every 8 hours 4 hour infusion 1890 mL 0     acetaminophen (TYLENOL) 325 MG tablet 1000 mg BID scheduled and 650 mg bid prn       albuterol (PROAIR HFA/PROVENTIL HFA/VENTOLIN HFA) 108 (90 Base) MCG/ACT inhaler Inhale 2 puffs into the lungs 2 times daily as needed       clopidogrel (PLAVIX) 75 MG tablet Take 1 tablet (75 mg) by mouth daily Start taking medication the day after the procedure. 90 tablet 1     DAPTOmycin (CUBICIN) 500 MG injection Inject 10 mLs (500 mg) into the vein daily for 28 days 280 mL 0     dulaglutide (TRULICITY) 0.75 MG/0.5ML pen Inject 0.75 mg Subcutaneous every 7 days       furosemide (LASIX) 40 MG tablet Take 40 mg by mouth daily       gabapentin (NEURONTIN) 100 MG  capsule Take 200 mg by mouth 2 times daily       ketoconazole (NIZORAL) 2 % external shampoo Apply topically twice a week Mon and Fri.       losartan (COZAAR) 25 MG tablet Take 12.5 mg by mouth daily       mineral oil-hydrophilic petrolatum (AQUAPHOR) external ointment Apply topically 2 times daily       omeprazole (PRILOSEC) 20 MG CR capsule Take 20 mg by mouth daily       polyethylene glycol (MIRALAX) 17 g packet Take 1 packet by mouth daily       rivaroxaban ANTICOAGULANT (XARELTO) 15 MG TABS tablet Take 15 mg by mouth 2 times daily X 21 days, then change to 20mg/day       senna-docusate (SENOKOT-S/PERICOLACE) 8.6-50 MG tablet Take 1 tablet by mouth 2 times daily And BID PRN       simvastatin (ZOCOR) 40 MG tablet Take 40 mg by mouth At Bedtime       spironolactone (ALDACTONE) 25 MG tablet Take 25 mg by mouth daily       tamsulosin (FLOMAX) 0.4 MG capsule Take 0.4 mg by mouth daily       Objective: BP (!) 145/71   Pulse 81   Temp 98  F (36.7  C)   Resp 18   Wt 85.9 kg (189 lb 6.4 oz)   SpO2 96%   BMI 27.18 kg/m     BGs:    Exam:  GENERAL APPEARANCE: Alert, in no distress, cooperative.   RESP: Respiratory effort good, no respiratory distress.On RA.   CV: Edema 1+ BLE, right slightly > left.. Rubor present.  PSYCH: Insight, judgement, and memory are impaired at baseline, affect and mood are happy/engaged.    Labs: Labs done in facility are in EPIC. Please refer to them using [employer] Everywhere.    ASSESSMENT/PLAN:  S/P foot surgery, right  Osteomyelitis of right foot, unspecified type (H)  PAD (peripheral artery disease) (H)  Acute deep vein thrombosis (DVT) of right peroneal vein (H)  Polyneuropathy due to type 2 diabetes mellitus (H)  Acute on chronic. Ongoing.    Ever doing well status post most recent surgery, except for the development of provoked DVT which has been treated with treatment ongoing through next week.  He will remain on maintenance therapy after that.  There are no signs or symptoms of  bleeding.    Ever will follow-up with Miguelangel Spears on 7/7.    Pain appears well controlled.    Diabetes also appears well controlled, and will trial a discontinuation of glipizide given there is a risk of hypoglycemia and overall lack of efficacy with this drug in older adults.  Follow up w/in 1-2 weeks or as needed.    Orders:  1. Discontinue Glipizide    Electronically signed by:  KEVIN Wen CNP DNP          Sincerely,        KEVIN Bansal CNP       Adult

## 2023-07-07 ENCOUNTER — OFFICE VISIT (OUTPATIENT)
Dept: VASCULAR SURGERY | Facility: CLINIC | Age: 78
End: 2023-07-07
Payer: MEDICARE

## 2023-07-07 VITALS
DIASTOLIC BLOOD PRESSURE: 74 MMHG | RESPIRATION RATE: 16 BRPM | OXYGEN SATURATION: 97 % | HEART RATE: 58 BPM | SYSTOLIC BLOOD PRESSURE: 142 MMHG | HEIGHT: 70 IN | BODY MASS INDEX: 27.06 KG/M2 | WEIGHT: 189 LBS

## 2023-07-07 DIAGNOSIS — L97.421 ULCER OF LEFT HEEL AND MIDFOOT, LIMITED TO BREAKDOWN OF SKIN (H): ICD-10-CM

## 2023-07-07 DIAGNOSIS — L97.514 ULCER OF RIGHT FOOT WITH NECROSIS OF BONE (H): Primary | ICD-10-CM

## 2023-07-07 DIAGNOSIS — L89.613 PRESSURE ULCER OF RIGHT HEEL, STAGE 3 (H): ICD-10-CM

## 2023-07-07 LAB
ALBUMIN SERPL BCG-MCNC: 4.2 G/DL (ref 3.5–5.2)
ALP SERPL-CCNC: 89 U/L (ref 40–129)
ALT SERPL W P-5'-P-CCNC: 5 U/L (ref 0–70)
ANION GAP SERPL CALCULATED.3IONS-SCNC: 13 MMOL/L (ref 7–15)
AST SERPL W P-5'-P-CCNC: 12 U/L (ref 0–45)
BASOPHILS # BLD AUTO: 0.1 10E3/UL (ref 0–0.2)
BASOPHILS NFR BLD AUTO: 1 %
BILIRUB SERPL-MCNC: 0.6 MG/DL
BUN SERPL-MCNC: 16.3 MG/DL (ref 8–23)
CALCIUM SERPL-MCNC: 10 MG/DL (ref 8.8–10.2)
CHLORIDE SERPL-SCNC: 102 MMOL/L (ref 98–107)
CK SERPL-CCNC: 44 U/L (ref 39–308)
CREAT SERPL-MCNC: 1.29 MG/DL (ref 0.67–1.17)
CRP SERPL-MCNC: 25.28 MG/L
DEPRECATED HCO3 PLAS-SCNC: 24 MMOL/L (ref 22–29)
EOSINOPHIL # BLD AUTO: 0.6 10E3/UL (ref 0–0.7)
EOSINOPHIL NFR BLD AUTO: 7 %
ERYTHROCYTE [DISTWIDTH] IN BLOOD BY AUTOMATED COUNT: 16.1 % (ref 10–15)
GFR SERPL CREATININE-BSD FRML MDRD: 57 ML/MIN/1.73M2
GLUCOSE SERPL-MCNC: 146 MG/DL (ref 70–99)
HCT VFR BLD AUTO: 37.6 % (ref 40–53)
HGB BLD-MCNC: 11.2 G/DL (ref 13.3–17.7)
IMM GRANULOCYTES # BLD: 0 10E3/UL
IMM GRANULOCYTES NFR BLD: 0 %
LYMPHOCYTES # BLD AUTO: 1.1 10E3/UL (ref 0.8–5.3)
LYMPHOCYTES NFR BLD AUTO: 13 %
MCH RBC QN AUTO: 24.5 PG (ref 26.5–33)
MCHC RBC AUTO-ENTMCNC: 29.8 G/DL (ref 31.5–36.5)
MCV RBC AUTO: 82 FL (ref 78–100)
MONOCYTES # BLD AUTO: 0.7 10E3/UL (ref 0–1.3)
MONOCYTES NFR BLD AUTO: 8 %
NEUTROPHILS # BLD AUTO: 6.1 10E3/UL (ref 1.6–8.3)
NEUTROPHILS NFR BLD AUTO: 71 %
NRBC # BLD AUTO: 0 10E3/UL
NRBC BLD AUTO-RTO: 0 /100
PLATELET # BLD AUTO: 244 10E3/UL (ref 150–450)
POTASSIUM SERPL-SCNC: 4.2 MMOL/L (ref 3.4–5.3)
PROT SERPL-MCNC: 8.1 G/DL (ref 6.4–8.3)
RBC # BLD AUTO: 4.57 10E6/UL (ref 4.4–5.9)
SODIUM SERPL-SCNC: 139 MMOL/L (ref 136–145)
WBC # BLD AUTO: 8.5 10E3/UL (ref 4–11)

## 2023-07-07 PROCEDURE — 11044 DBRDMT BONE 1ST 20 SQ CM/<: CPT | Mod: 58 | Performed by: PODIATRIST

## 2023-07-07 PROCEDURE — 85025 COMPLETE CBC W/AUTO DIFF WBC: CPT | Mod: ORL | Performed by: FAMILY MEDICINE

## 2023-07-07 PROCEDURE — P9603 ONE-WAY ALLOW PRORATED MILES: HCPCS | Mod: ORL | Performed by: FAMILY MEDICINE

## 2023-07-07 PROCEDURE — 11042 DBRDMT SUBQ TIS 1ST 20SQCM/<: CPT | Performed by: PODIATRIST

## 2023-07-07 PROCEDURE — 11043 DBRDMT MUSC&/FSCA 1ST 20/<: CPT | Performed by: PODIATRIST

## 2023-07-07 PROCEDURE — 11045 DBRDMT SUBQ TISS EACH ADDL: CPT | Performed by: PODIATRIST

## 2023-07-07 PROCEDURE — 82550 ASSAY OF CK (CPK): CPT | Mod: ORL | Performed by: FAMILY MEDICINE

## 2023-07-07 PROCEDURE — 11046 DBRDMT MUSC&/FSCA EA ADDL: CPT | Performed by: PODIATRIST

## 2023-07-07 PROCEDURE — 80053 COMPREHEN METABOLIC PANEL: CPT | Mod: ORL | Performed by: FAMILY MEDICINE

## 2023-07-07 PROCEDURE — 11047 DBRDMT BONE EACH ADDL: CPT | Mod: 58 | Performed by: PODIATRIST

## 2023-07-07 PROCEDURE — 11047 DBRDMT BONE EACH ADDL: CPT | Performed by: PODIATRIST

## 2023-07-07 PROCEDURE — 11042 DBRDMT SUBQ TIS 1ST 20SQCM/<: CPT | Mod: XU | Performed by: PODIATRIST

## 2023-07-07 PROCEDURE — 11043 DBRDMT MUSC&/FSCA 1ST 20/<: CPT | Mod: XS | Performed by: PODIATRIST

## 2023-07-07 PROCEDURE — 86140 C-REACTIVE PROTEIN: CPT | Mod: ORL | Performed by: FAMILY MEDICINE

## 2023-07-07 PROCEDURE — 36415 COLL VENOUS BLD VENIPUNCTURE: CPT | Mod: ORL | Performed by: FAMILY MEDICINE

## 2023-07-07 PROCEDURE — 11044 DBRDMT BONE 1ST 20 SQ CM/<: CPT | Performed by: PODIATRIST

## 2023-07-07 RX ORDER — PIPERACILLIN SODIUM, TAZOBACTAM SODIUM 3; .375 G/15ML; G/15ML
3.38 INJECTION, POWDER, LYOPHILIZED, FOR SOLUTION INTRAVENOUS EVERY 8 HOURS
Qty: 1890 ML | Refills: 0
Start: 2023-07-07 | End: 2023-08-05

## 2023-07-07 RX ORDER — GLIPIZIDE 5 MG/1
TABLET ORAL
COMMUNITY
Start: 2023-07-03 | End: 2023-07-07

## 2023-07-07 ASSESSMENT — PAIN SCALES - GENERAL: PAINLEVEL: MILD PAIN (2)

## 2023-07-07 NOTE — PATIENT INSTRUCTIONS
Important lnstructions    RESTART WOUND VAC ON RIGHT HEEL as ORDERED BELOW.    WEIGHT BEARING STATUS: You are to remain NON WEIGHT BEARING on your right foot. NON WEIGHT BEARING MEANS NO PRESSURE ON YOUR FOOT OR HEEL AT ANY TIME FOR ANY REASON!    2. OFFLOADING DEVICE: Must use a A WHEELCHAIR at all times! (do not use affected foot to push wheelchair)    3. STABILIZATION DEVICE: Use a PRAFO BOOT BILATERALLY. You will need to WEAR THIS AT ALL TIMES EVEN WHILE IN BED.     4. ELEVATE: Elevating your leg means laying with your head on a pillow and your foot ABOVE YOUR HEART.     5. DO NOT MOVE YOUR FOOT.  There is a risk of worsening the wound or incision. To give yourself a higher chance of healing, please DO NOT swing foot back and forth and wiggle foot/toes especially when inside a stabilization device. Limited movement is allowable with therapy as recommended by the doctor.     6. TAKE A PROTEIN SHAKE TWICE A DAY.  (For ex: Boost, Ensure, Glucerna)      Dressing Change lnstructions:    LEFT HEEL & RIGHT DISTAL FOOT WOUND:    - Dressing Application of  Endoform for left heel & right distal foot wound:    1. Endoform should be cut to the size of the wound.  It should touch the edges of the ulcer. It is okay if it overlap the edges a small amount.  Then, moisten the Endoform with saline.(If it is easier for you, you can moisten it before laying it in the wound)    2. Cover the wound with Endoform, followed by dry gauze, and secure with roll gauze if needed.     3. Endoform is naturally used by the body over time so you may not find it in the wound when you change your dressing.  If you do find some, gently cleanse the wound with saline. Do not remove the remaining Endoform, which may appear as an off-white to max gel, just add Endoform on top.  Usually, more Endoform will need to be added every 5-7 days.     4. Change your top dressing every 1-2 days or as needed depending on drainage.    -Endoform is a collagen  dressing created from ovine (sheep) fore-stomach tissue. The collagen extracellular matrix transforms into a soft conforming sheet, which is naturally incorporated into the wound over time.  Collagen dressings are used to stimulate wound healing.   ,      RIGHT HEEL WOUND:          Standard - Wound Vac Instructions    1. 3x weekly and as needed cleanse the area with NS    2. Pat dry    3. Apply Cavilon no sting barrier wipe to the skin surrounding the wound to protect from drainage/maceration    4. Apply drape around incision. Cut strip of drape and apply to skin if bridging is needed; plan this area in advance; should not be over bony prominence     5. Cut the foam to fit the area of the incision    6. Cut narrow strip of foam if bridging    7. Cover foam with drape to obtain air tight seal    8. Cut opening the size of a quarter for where the suction pad will be applied    9. Apply Suction pad    10. 125mmHG suction continuous    [employer] [facility] can be reached at [phone], 24 hours a day 7 days a week     - Back up plan in place:     If the negative pressure wound therapy malfunctions or unable to maintain seal: dressing must be removed and reapplied within 2 hours of the incident. If unable to reapply negative pressure wound dressing, place Normal Saline moistened gauze in wound bed and cover with appropriate dressing to keep wound bed moist.  Change wet-to-dry dressing two times a day until healthcare staff can re-implement negative pressure therapy. Change canister at least weekly.  [employer] [facility] can be reached at [phone], 24 hours a day 7 days a week    Information on Vacuum-Assisted Closure of a Wound  Vacuum-assisted closure (VAC) of a wound is a type of treatment to help wounds heal. It s also known as negative pressure wound therapy. During the treatment, a device lowers air pressure on the wound. This can help the wound heal more quickly.  Understanding the wound VAC system  A wound  VAC system has several parts. A foam or gauze dressing is put directly on the wound. The dressing is changed every 24 to 72 hours. An adhesive film covers and seals the dressing and wound. A drainage tube leads from under the adhesive film and connects to a portable vacuum pump. This pump removes air pressure over the wound. It may do this constantly. Or it may do it in cycles. During the treatment, you ll need to carry the portable pump everywhere you go.  Why wound VAC is used  You might need this therapy for a recent traumatic wound. Or you may need it for a chronic wound. This is a wound that does not heal the way it should over time. This can happen with wounds in people who have diabetes. You may need a wound VAC if you ve had a recent skin graft. And you may need a wound VAC for a large wound. Large wounds can take a longer time to heal.  A wound vacuum system may help your wound heal more quickly by:  Draining extra fluid from the wound  Reducing swelling  Reducing bacteria in the wound  Keeping your wound moist and warm  Helping draw together wound edges  Increasing blood flow to your wound  Decreasing inflammation  Wound VAC offers some other advantages over other types of wound care. It may decrease your overall discomfort. The dressings usually need to be changed less often. And they may be easier to keep in position.         It IS NOT ok to get your wound wet in the bath or shower    SEEK MEDICAL CARE IF:  You have an increase in swelling, pain, or redness around the wound.  You have an increase in the amount of pus coming from the wound.  There is a bad smell coming from the wound.  The wound appears to be worsening/enlarging  You have a fever greater than 101.5 F      It is ok to continue current wound care treatment/products for the next 2-3 days until new wound care supplies are ordered and arrive. If longer than this please contact our office at [phone].        We want to hear from you!   In  the next few weeks, you should receive a call or email to complete a survey about your visit at [facility]. Please help us improve your appointment experience by letting us know how we did today. We strive to make your experience good and value any ways in which we could do better.      We value your input and suggestions.    Thank you for choosing the [facility]!

## 2023-07-07 NOTE — PROGRESS NOTES
FOOT AND ANKLE SURGERY/PODIATRY Progress Note        ASSESSMENT:   Ulceration right foot  Osteomyelitis calcaneus right   Ulceration right heel  Ulceration left heel           TREATMENT:  -I discussed with the patient and his sister today that all the ulcerative sites are measuring smaller today.  I am pleased with his progress and recommend we continue with endoform along the left heel and distal right foot.  Due to continued increased depth along the right heel ulceration I recommend we continue with the wound VAC at this time.      -Discussed importance of nonweightbearing on the right foot.  PRAFO boots bilateral feet.     -After discussion of risk factors, nursing staff removed dressing, cleansed wound and consent obtained 2% Lidocaine HCL jelly was applied, under clean conditions, the right foot ulceration(s) were debrided using currette.  Devitalized and nonviable tissue, along with any fibrin and slough, was removed to improve granulation tissue formation, stimulate wound healing, decrease overall bacteria load, disrupt biofilm formation and decrease edge senescence. Wound drainage was scant No. Total excisional debridement was 70.4 sq cm into the bone with a depth of 0.3 cm.   Ulcers were improved afterwards and .  Measures were as noted on the flow sheet. A gauze dressing was applied.      -After discussion of risk factors, nursing staff removed dressing, cleansed wound and consent obtained 2% Lidocaine HCL jelly was applied, under clean conditions, the right heel ulceration(s) were debrided using currette.  Devitalized and nonviable tissue, along with any fibrin and slough, was removed to improve granulation tissue formation, stimulate wound healing, decrease overall bacteria load, disrupt biofilm formation and decrease edge senescence. Wound drainage was scant No. Total excisional debridement was 24 sq cm into the muscle/fascia with a depth of 0.7 cm.   Ulcers were improved afterwards and  .  Measures were as noted on the flow sheet. A gauze dressing was applied.      -After discussion of risk factors, nursing staff removed dressing, cleansed wound and consent obtained 2% Lidocaine HCL jelly was applied, under clean conditions, the left heel ulceration(s) were debrided using currette.  Devitalized and nonviable tissue, along with any fibrin and slough, was removed to improve granulation tissue formation, stimulate wound healing, decrease overall bacteria load, disrupt biofilm formation and decrease edge senescence. Wound drainage was scant No. Total excisional debridement was 11.52 sq cm into the subcutaneous tissue with a depth of 0.2 cm.   Ulcers were improved afterwards and .  Measures were as noted on the flow sheet. Collagen with a gauze dresssing was applied. He will continue to apply Collagen with a gauze dresssing as directed.     -He will follow-up in 2 weeks    Miguelangel Spears DPM  [facility]      HPI: Ever Crane was seen again today for bilateral foot ulcerations.  He has used PRAFO boots overnight as directed.    Past Medical History:   Diagnosis Date     A-fib (H)      MESHA (acute kidney injury) (H)      Anemia      Atopic keratoconjunctivitis      Atrial fibrillation (H)     Abhi Brian: 8/2011 Cardioversion; CHADS2 VASC = 5; he is on warfarin and sotalol      Atrial flutter (H)      Mcgarry's esophagus 01/01/2012    per note of Dr. Tavo Mike of [facility]     Bilateral lower leg cellulitis 08/31/2018     BPH (benign prostatic hyperplasia)      Candidiasis of perineum 01/03/2018     Carotid stenosis      Carotid stenosis, asymptomatic, right      Cellulitis      CHF (congestive heart failure) (H)      Cholecystitis, acute 08/18/2019     Chronic systolic heart failure (H)      Chronic venous stasis dermatitis      Closed nondisplaced intertrochanteric fracture of left femur with routine healing, subsequent encounter 05/18/2022     Clostridium difficile  colitis      COPD (chronic obstructive pulmonary disease) (H)      Coronary artery disease due to lipid rich plaque 2000    CABG x2     Coronary atherosclerosis      Diabetes (H)      Diabetic ulcer of both feet (H) 10/31/2017     Dyslexia      Dyslipidemia, goal LDL below 70 2000     Epistaxis      Essential hypertension      Gangrene of left foot (H)      GERD (gastroesophageal reflux disease)      HLD (hyperlipidemia)      HTN (hypertension)      Hyponatremia      Ischemic heart disease      Lymphedema      Mild cognitive impairment      MRSA (methicillin resistant Staphylococcus aureus)      Neuropathy      Non-STEMI (non-ST elevated myocardial infarction) (H)      Occlusion and stenosis of right carotid artery      Osteoarthrosis      Osteomyelitis of ankle and foot (H)      Other atopic dermatitis      PAD (peripheral artery disease) (H)      Peripheral vascular disease (H)      Pneumonia 06/26/2018     Polyneuropathy due to type 2 diabetes mellitus (H)      Pressure ulcer, heel     Bilateral     Renal insufficiency      Type 2 diabetes mellitus, without long-term current use of insulin (H)      Ulcer of right foot (H)      Unable to function independently 11/13/2017       Past Surgical History:   Procedure Laterality Date     AMPUTATE FOOT Left 11/05/2017    Procedure: LEFT TRANSMETATARSAL AMPUTATION;  Surgeon: Ever Wick MD;  Location: [facility] [place];  Service:      AMPUTATE FOOT Right 4/27/2023    Procedure: Transmetatarsal amputation right foot;  Surgeon: Miguelangel Spears DPM;  [facility] [facility] [place]     AMPUTATE FOOT Right 5/15/2023    Procedure: partial calcanectomy;  Surgeon: Miguelangel Spears DPM;  Location: [facility]     BYPASS GRAFT ARTERY CORONARY N/A 03/06/2000    SVG to OM1, SVG to PDA     BYPASS GRAFT ARTERY CORONARY N/A 10/04/2018    redo CABG due to graft failure     CABG MEASURES GRP       CARDIOVERSION  08/25/2011     CV CORONARY ANGIOGRAM N/A 10/01/2018     Procedure: Coronary Angiogram;  Surgeon: Miki Mac MD;  Location: [facility];  Service:      INCISION AND DRAINAGE FOOT, COMBINED Right 5/15/2023    Procedure: INCISION AND DRAINAGE, right heel with,;  Surgeon: Miguelangel Spears DPM;  Location: [facility] [place]     INCISION AND DRAINAGE FOOT, COMBINED Bilateral 6/1/2023    Procedure: INCISION AND DRAINAGE, right foot with debridement of ulceration bilateral heels;  Surgeon: Miguelangel Spears DPM;  Location: [facility] [place]     INGUINAL HERNIA REPAIR Bilateral 1969    and 1979     IR EXTREMITY ANGIOGRAM BILATERAL  11/3/2017     IR LOWER EXTREMITY ANGIOGRAM LEFT  3/10/2023     IR LOWER EXTREMITY ANGIOGRAM RIGHT  1/24/2023     LAPAROSCOPIC CHOLECYSTECTOMY N/A 08/18/2019    Procedure: CHOLECYSTECTOMY, LAPAROSCOPIC;  Surgeon: Stewart Fountain MD;  Location: [facility] OR;  Service: General     LENGTHEN TENDON ACHILLES Right 4/27/2023    Procedure: with Achilles tendon lengthening, debridement of right heel ulceration.;  Surgeon: Miguelangel Spears DPM;  Location: [facility] [place]       Allergies   Allergen Reactions     Cranberry Extract Itching and Rash     Doxycycline Rash     Latex Rash     Penicillin V Rash     Reaction occurred as a child. Patient tolerated Zosyn 6/2018, Cefazolin 10/2018, and has also tolerated Augmentin.     Penicillins Rash     Reaction occurred as a child. Patient tolerated Zosyn 6/2018, Cefazolin 10/2018, and has also tolerated Augmentin.         Current Outpatient Medications:      acetaminophen (TYLENOL) 325 MG tablet, 1000 mg BID scheduled and 650 mg bid prn, Disp: , Rfl:      albuterol (PROAIR HFA/PROVENTIL HFA/VENTOLIN HFA) 108 (90 Base) MCG/ACT inhaler, Inhale 2 puffs into the lungs 2 times daily as needed, Disp: , Rfl:      clopidogrel (PLAVIX) 75 MG tablet, Take 1 tablet (75 mg) by mouth daily Start taking medication the day after the procedure., Disp: 90 tablet, Rfl: 1     DAPTOmycin (CUBICIN)  "500 MG injection, Inject 10 mLs (500 mg) into the vein daily for 28 days, Disp: 280 mL, Rfl: 0     dulaglutide (TRULICITY) 0.75 MG/0.5ML pen, Inject 0.75 mg Subcutaneous every 7 days, Disp: , Rfl:      furosemide (LASIX) 40 MG tablet, Take 40 mg by mouth daily, Disp: , Rfl:      gabapentin (NEURONTIN) 100 MG capsule, Take 200 mg by mouth 2 times daily, Disp: , Rfl:      glipiZIDE (GLUCOTROL) 5 MG tablet, , Disp: , Rfl:      ketoconazole (NIZORAL) 2 % external shampoo, Apply topically twice a week Mon and Fri., Disp: , Rfl:      losartan (COZAAR) 25 MG tablet, Take 12.5 mg by mouth daily, Disp: , Rfl:      mineral oil-hydrophilic petrolatum (AQUAPHOR) external ointment, Apply topically 2 times daily, Disp: , Rfl:      omeprazole (PRILOSEC) 20 MG CR capsule, Take 20 mg by mouth daily, Disp: , Rfl:      piperacillin-tazobactam (ZOSYN) 3-0.375 GM vial, Inject 3.375 g into the vein every 8 hours 4 hour infusion, Disp: 1890 mL, Rfl: 0     polyethylene glycol (MIRALAX) 17 g packet, Take 1 packet by mouth daily, Disp: , Rfl:      rivaroxaban ANTICOAGULANT (XARELTO) 15 MG TABS tablet, Take 15 mg by mouth 2 times daily X 21 days, then change to 20mg/day, Disp: , Rfl:      senna-docusate (SENOKOT-S/PERICOLACE) 8.6-50 MG tablet, Take 1 tablet by mouth 2 times daily And BID PRN, Disp: , Rfl:      simvastatin (ZOCOR) 40 MG tablet, Take 40 mg by mouth At Bedtime, Disp: , Rfl:      spironolactone (ALDACTONE) 25 MG tablet, Take 25 mg by mouth daily, Disp: , Rfl:      tamsulosin (FLOMAX) 0.4 MG capsule, Take 0.4 mg by mouth daily, Disp: , Rfl:     Review of Systems - 10 point Review of Systems is negative except for foot ulcers which is noted in HPI.      OBJECTIVE:  Pulse 58   Resp 16   Ht 5' 10\" (1.778 m)   Wt 189 lb (85.7 kg)   SpO2 97%   BMI 27.12 kg/m    General appearance: Patient is alert and fully cooperative with history & exam.  No sign of distress is noted during the visit.    Vascular: Dorsalis pedis " non-palpableBilateral.  Dermatologic:    VASC Wound Right heel (Active)   Pre Size Length 4 07/07/23 1328   Pre Size Width 6 07/07/23 1328   Pre Size Depth 0.7 07/07/23 1328   Pre Total Sq cm 24 07/07/23 1328   Post Size Length 9 01/13/23 1300   Post Size Width 9 01/13/23 1300   Post Size Depth 0.1 01/13/23 1300   Post Total Sq cm 81 01/13/23 1300   Description eschar 04/05/23 1000       VASC Wound Left heel (Active)   Pre Size Length 2 07/07/23 1328   Pre Size Width 2.5 07/07/23 1328   Pre Size Depth 0.2 07/07/23 1328   Pre Total Sq cm 5 07/07/23 1328   Post Size Length 7 01/13/23 1300   Post Size Width 6 01/13/23 1300   Post Size Depth 0.1 01/13/23 1300   Post Total Sq cm 42 01/13/23 1300       VASC Wound Right Distal foot (Active)   Pre Size Length 4.4 07/07/23 1328   Pre Size Width 16 07/07/23 1328   Pre Size Depth 0.3 07/07/23 1328   Pre Total Sq cm 70.4 07/07/23 1328       Incision/Surgical Site 06/01/23 Bilateral Foot (Active)   Granular base bilateral heel ulcerations and distal right foot ulceration.  No erythema bilateral lower extremities.  Neurologic: Diminished to light touch Bilateral.  Musculoskeletal: TMA bilateral feet.      Picture:

## 2023-07-12 ENCOUNTER — OFFICE VISIT (OUTPATIENT)
Dept: INFECTIOUS DISEASES | Facility: CLINIC | Age: 78
End: 2023-07-12
Payer: MEDICARE

## 2023-07-12 ENCOUNTER — TELEPHONE (OUTPATIENT)
Dept: VASCULAR SURGERY | Facility: CLINIC | Age: 78
End: 2023-07-12

## 2023-07-12 VITALS
OXYGEN SATURATION: 98 % | TEMPERATURE: 97.6 F | SYSTOLIC BLOOD PRESSURE: 138 MMHG | DIASTOLIC BLOOD PRESSURE: 62 MMHG | HEART RATE: 41 BPM

## 2023-07-12 DIAGNOSIS — M86.9 OSTEOMYELITIS OF ANKLE AND FOOT (H): ICD-10-CM

## 2023-07-12 PROCEDURE — 99214 OFFICE O/P EST MOD 30 MIN: CPT | Performed by: INTERNAL MEDICINE

## 2023-07-12 RX ORDER — DAPTOMYCIN 50 MG/ML
500 INJECTION, POWDER, LYOPHILIZED, FOR SOLUTION INTRAVENOUS DAILY
Qty: 230 ML | Refills: 0
Start: 2023-07-12 | End: 2023-08-04

## 2023-07-12 NOTE — PROGRESS NOTES
[facility] FOLLOW UP NOTE    Date: 7/12/2023  Patient Name: Ever Crane   YOB: 1945  MRN: [id number]      ASSESSMENT:  1. Osteomyelitis R calcaneus. I+D 5/15/23. Cultures with enterobacter, enterococcus, bacteroides, corynebacterium from May 2023.  Repeat debridement 6/1/23 included medial wound into 1st metatarsal. Cultures with enterococcus avium and corynebacterium. These are generally benign skin ariel, but can sometimes be pathogens. Corynebacterium often resistant to most antibiotics, but vancomycin and daptomycin treat this. Now with improvement in wounds although still with exposed bone R heel. CRP fluctuating, overall improved.  2. L heel wound to soft tissues  3. S/p R TMA 4/27/23. Cultures MSSA (not MRSA)  4. DM  5. H/o remote penicillin allergy. Has tolerated pip/tazo.   6. H/o MRSA in 2019. Negative May 2023.   7. DVT R LE occurred while on anticoagulation, dose now increased.     PLAN:  Zosyn 6 weeks beyond surgery 6/1/23, so last day is 7/13/23  Daptomycin started around 6/23. Plan through 8/4/23.  Continue weekly labs  Return early August, can be video visit.    Wagner Cr MD  [facility]   Clinic phone: [phone]   Clinic fax: [phone]    ______________________________________________________________________    SUBJECTIVE / INTERVAL HISTORY: Ever Crane returns for follow up of osteomyelitis of right foot.     Has surgery 6/1/23, feet debrided, including down to bone at 1st MT. He is at [facility].    I added daptomycin around 6/23 due to CRP and ongoing wound into bone.     Wound vac restarted last week by Dr. Spears. Had wounds debrided 7/7 in clinic by Dr. Spears. Labs reviewed.     Feels pretty good. Chronic diarrhea, this is a little better. Sister asked about repeat MRSA screening. Has pain at right heel, this is about the same. [facility]     ROS: All other systems negative except as  listed above.      Current Outpatient Medications:     acetaminophen (TYLENOL) 325 MG tablet, 1000 mg BID scheduled and 650 mg bid prn, Disp: , Rfl:     albuterol (PROAIR HFA/PROVENTIL HFA/VENTOLIN HFA) 108 (90 Base) MCG/ACT inhaler, Inhale 2 puffs into the lungs 2 times daily as needed, Disp: , Rfl:     clopidogrel (PLAVIX) 75 MG tablet, Take 1 tablet (75 mg) by mouth daily Start taking medication the day after the procedure., Disp: 90 tablet, Rfl: 1    DAPTOmycin (CUBICIN) 500 MG injection, Inject 10 mLs (500 mg) into the vein daily for 28 days, Disp: 280 mL, Rfl: 0    dulaglutide (TRULICITY) 0.75 MG/0.5ML pen, Inject 0.75 mg Subcutaneous every 7 days, Disp: , Rfl:     furosemide (LASIX) 40 MG tablet, Take 40 mg by mouth daily, Disp: , Rfl:     gabapentin (NEURONTIN) 100 MG capsule, Take 200 mg by mouth 2 times daily, Disp: , Rfl:     ketoconazole (NIZORAL) 2 % external shampoo, Apply topically twice a week Mon and Fri., Disp: , Rfl:     losartan (COZAAR) 25 MG tablet, Take 12.5 mg by mouth daily, Disp: , Rfl:     mineral oil-hydrophilic petrolatum (AQUAPHOR) external ointment, Apply topically 2 times daily, Disp: , Rfl:     omeprazole (PRILOSEC) 20 MG CR capsule, Take 20 mg by mouth daily, Disp: , Rfl:     piperacillin-tazobactam (ZOSYN) 3-0.375 GM vial, Inject 3.375 g into the vein every 8 hours 4 hour infusion, Disp: 1890 mL, Rfl: 0    polyethylene glycol (MIRALAX) 17 g packet, Take 1 packet by mouth daily, Disp: , Rfl:     rivaroxaban ANTICOAGULANT (XARELTO) 15 MG TABS tablet, Take 15 mg by mouth 2 times daily X 21 days, then change to 20mg/day, Disp: , Rfl:     senna-docusate (SENOKOT-S/PERICOLACE) 8.6-50 MG tablet, Take 1 tablet by mouth 2 times daily And BID PRN, Disp: , Rfl:     simvastatin (ZOCOR) 40 MG tablet, Take 40 mg by mouth At Bedtime, Disp: , Rfl:     spironolactone (ALDACTONE) 25 MG tablet, Take 25 mg by mouth daily, Disp: , Rfl:     tamsulosin (FLOMAX) 0.4 MG capsule, Take 0.4 mg by mouth daily,  Disp: , Rfl:       OBJECTIVE:  /62 (BP Location: Left arm, Patient Position: Sitting, Cuff Size: Adult Regular)   Pulse (!) 41   Temp 97.6  F (36.4  C) (Oral)   SpO2 98%       GEN: No acute distress.  In wheelchair.   RESPIRATORY:  Normal breathing pattern.   CARDIOVASCULAR:  Regular rate and rhythm.   ABDOMEN:  Soft, normal bowel sounds, non-tender, no masses, no organomegaly.  EXTREMITIES: feet wrapped, reviewed recent photos  SKIN/HAIR/NAILS:  No rashes  PICC R UE        Pertinent labs:  Reviewed current    MICROBIOLOGY DATA:  Enterococcus avium, corynebacterium last cultures 6/1/23.     MRSA screen negative at last visit    RADIOLOGY:

## 2023-07-12 NOTE — TELEPHONE ENCOUNTER
Caller: Renay at [facility]    Provider: LINO Spears    Detailed reason for call: Patient is known to be non-compliant.He only wears the blue off-loading boots when in his room but takes them off when leaving his room. He consistently bears weight on right foot. He disconnects is wound vac many times daily. Wets himself and soils wound dressing. While sitting he moves and swings his feet from the bed.    The latest order includes PRAFO bilaterally but he does not have them. Are these being order for him?    [facility] needs updated orders that they can reasonably hold the patient to. Please call to clarify and fax order to [phone].    Best phone number to contact: [phone]    Best time to contact: any    Ok to leave a detailed message: Yes

## 2023-07-12 NOTE — PATIENT INSTRUCTIONS
Last day of zosyn is 7/13/23.     Continue daptomycin (the second antibiotic I added) until 8/4/23.     Continue same weekly labs.     We can check your nose for MRSA once you are off an antibiotic to treats MRSA (daptomycin) so that this is a more reliable test.     Return in 3 weeks.     Wagner Cr MD

## 2023-07-12 NOTE — Clinical Note
Can you confirm that they have the antibiotic orders at [facility] set based on my note? Wagner Cr MD

## 2023-07-13 ENCOUNTER — TRANSITIONAL CARE UNIT VISIT (OUTPATIENT)
Dept: GERIATRICS | Facility: CLINIC | Age: 78
End: 2023-07-13
Payer: MEDICARE

## 2023-07-13 ENCOUNTER — LAB REQUISITION (OUTPATIENT)
Dept: LAB | Facility: CLINIC | Age: 78
End: 2023-07-13
Payer: MEDICARE

## 2023-07-13 ENCOUNTER — TELEPHONE (OUTPATIENT)
Dept: INFECTIOUS DISEASES | Facility: CLINIC | Age: 78
End: 2023-07-13

## 2023-07-13 VITALS
OXYGEN SATURATION: 96 % | HEIGHT: 70 IN | TEMPERATURE: 98.3 F | RESPIRATION RATE: 18 BRPM | HEART RATE: 62 BPM | BODY MASS INDEX: 26.63 KG/M2 | SYSTOLIC BLOOD PRESSURE: 136 MMHG | DIASTOLIC BLOOD PRESSURE: 56 MMHG | WEIGHT: 186 LBS

## 2023-07-13 DIAGNOSIS — Z46.89 ENCOUNTER FOR MANAGEMENT OF WOUND VAC: ICD-10-CM

## 2023-07-13 DIAGNOSIS — I82.451 ACUTE DEEP VEIN THROMBOSIS (DVT) OF RIGHT PERONEAL VEIN (H): ICD-10-CM

## 2023-07-13 DIAGNOSIS — L08.9 LOCAL INFECTION OF THE SKIN AND SUBCUTANEOUS TISSUE, UNSPECIFIED: ICD-10-CM

## 2023-07-13 DIAGNOSIS — E11.59 TYPE 2 DIABETES MELLITUS WITH OTHER CIRCULATORY COMPLICATION, WITH LONG-TERM CURRENT USE OF INSULIN (H): ICD-10-CM

## 2023-07-13 DIAGNOSIS — L97.421 NON-PRESSURE CHRONIC ULCER OF LEFT HEEL AND MIDFOOT LIMITED TO BREAKDOWN OF SKIN (H): ICD-10-CM

## 2023-07-13 DIAGNOSIS — I73.9 PAD (PERIPHERAL ARTERY DISEASE) (H): ICD-10-CM

## 2023-07-13 DIAGNOSIS — M86.9 OSTEOMYELITIS OF RIGHT FOOT, UNSPECIFIED TYPE (H): ICD-10-CM

## 2023-07-13 DIAGNOSIS — E11.42 POLYNEUROPATHY DUE TO TYPE 2 DIABETES MELLITUS (H): ICD-10-CM

## 2023-07-13 DIAGNOSIS — Z98.890 S/P FOOT SURGERY, RIGHT: Primary | ICD-10-CM

## 2023-07-13 DIAGNOSIS — Z79.4 TYPE 2 DIABETES MELLITUS WITH OTHER CIRCULATORY COMPLICATION, WITH LONG-TERM CURRENT USE OF INSULIN (H): ICD-10-CM

## 2023-07-13 PROCEDURE — 99309 SBSQ NF CARE MODERATE MDM 30: CPT | Performed by: NURSE PRACTITIONER

## 2023-07-13 NOTE — LETTER
7/13/2023        RE: Ever Crane  [address]  [address]  [place] [address]        [facility]  Episodic/Acute/Follow-Up  [place] MRN: [id number]. Place of Service where encounter took place:  [facility] [place] [[id number]]   Chief Complaint   Patient presents with     RECHECK    HPI: Ever Crane  is a 78 year old (1945), who is being seen today for an episodic care visit. Today's concern is:    Ever seen today on routine follow-up as he continues to rehab in TCU.  He will be seen in the next 2 weeks by vascular and infectious disease on follow-up.  Today, he says that his right leg pain is much better, and even though the wound VAC is ongoing, it is probably helping.  He denies fever or chills.  He has ongoing numbness/tingling at baseline.  He denies new shortness of breath, cough, headache, dizziness and says his appetite is improved.  He denies any nausea or heartburn.    Past Medical and Surgical History reviewed in Epic today.  MEDICATIONS:  Current Outpatient Medications   Medication Sig Dispense Refill     acetaminophen (TYLENOL) 325 MG tablet 1000 mg BID scheduled and 650 mg bid prn       albuterol (PROAIR HFA/PROVENTIL HFA/VENTOLIN HFA) 108 (90 Base) MCG/ACT inhaler Inhale 2 puffs into the lungs 2 times daily as needed       clopidogrel (PLAVIX) 75 MG tablet Take 1 tablet (75 mg) by mouth daily Start taking medication the day after the procedure. 90 tablet 1     DAPTOmycin (CUBICIN) 500 MG injection Inject 10 mLs (500 mg) into the vein daily for 23 days Last day 8/4/23 230 mL 0     dulaglutide (TRULICITY) 0.75 MG/0.5ML pen Inject 0.75 mg Subcutaneous every 7 days       furosemide (LASIX) 40 MG tablet Take 40 mg by mouth daily       gabapentin (NEURONTIN) 100 MG capsule Take 200 mg by mouth 2 times daily       ketoconazole (NIZORAL) 2 % external shampoo Apply topically twice a week Mon and Fri.       losartan (COZAAR) 25 MG tablet Take 12.5 mg by mouth daily    "    mineral oil-hydrophilic petrolatum (AQUAPHOR) external ointment Apply topically 2 times daily       omeprazole (PRILOSEC) 20 MG CR capsule Take 20 mg by mouth daily       piperacillin-tazobactam (ZOSYN) 3-0.375 GM vial Inject 3.375 g into the vein every 8 hours 4 hour infusion 1890 mL 0     polyethylene glycol (MIRALAX) 17 g packet Take 1 packet by mouth daily       rivaroxaban ANTICOAGULANT (XARELTO) 15 MG TABS tablet Take 15 mg by mouth 2 times daily X 21 days, then change to 20mg/day       senna-docusate (SENOKOT-S/PERICOLACE) 8.6-50 MG tablet Take 1 tablet by mouth 2 times daily And BID PRN       simvastatin (ZOCOR) 40 MG tablet Take 40 mg by mouth At Bedtime       spironolactone (ALDACTONE) 25 MG tablet Take 25 mg by mouth daily       tamsulosin (FLOMAX) 0.4 MG capsule Take 0.4 mg by mouth daily       Objective: /56   Pulse 62   Temp 98.3  F (36.8  C)   Resp 18   Ht 1.778 m (5' 10\")   Wt 84.4 kg (186 lb)   SpO2 96%   BMI 26.69 kg/m     BGs:    Exam:  GENERAL APPEARANCE: Alert, in no distress, cooperative.   RESP: Respiratory effort good, no respiratory distress, On RA.   CV: Edema 1+ BLE.  Skin/MSK: BLE wrapped w/ wound vac on RLE.   PSYCH: Insight, judgement, and memory are baseline impaired, affect and mood are happy/engaged.    Labs: Labs done in facility are in EPIC. Please refer to them using [facility]Care Everywhere.    ASSESSMENT/PLAN:  S/P foot surgery, right  Osteomyelitis of right foot, unspecified type (H)  PAD (peripheral artery disease) (H)  Acute deep vein thrombosis (DVT) of right peroneal vein (H)  Polyneuropathy due to type 2 diabetes mellitus (H)  Encounter for management of wound VAC  Type 2 diabetes mellitus with other circulatory complication, with long-term current use of insulin (H)  Acute on chronic. Ongoing.    Follow-up with surgeon and infectious disease as already indicated.    DVT likely resolved/resolving, and Ever has returned to routine Xarelto dosing.    Diabetes " appears well controlled, and no need for frequent Accu-Cheks.  It appears Ever tolerated glipizide discontinuation.  Will reduce blood glucose checking, as there is less concern for hypoglycemia now.  Follow up w/in 1 week or as needed.    Orders:  1. Decrease BG to QAM. Dx: DM II    Electronically signed by:  Dr. Yanna Dozier, KEVIN CNP DNP          Sincerely,        KEVIN Bansal CNP

## 2023-07-13 NOTE — TELEPHONE ENCOUNTER
I called  at  [phone], for Trang, nurse manager at [facility], to discuss the below plan;  PLAN:  Zosyn 6 weeks beyond surgery 6/1/23, so last day is 7/13/23  Daptomycin started around 6/23. Plan through 8/4/23.  Continue weekly labs  Return early August, can be video visit.     Wagner Cr MD

## 2023-07-13 NOTE — PROGRESS NOTES
[facility]  Episodic/Acute/Follow-Up  [place] MRN: [id number]. Place of Service where encounter took place:  [facility] ([facility]) [[id number]]   Chief Complaint   Patient presents with     RECHECK    HPI: Ever Crane  is a 78 year old (1945), who is being seen today for an episodic care visit. Today's concern is:    Ever seen today on routine follow-up as he [place].  He will be seen in the next 2 weeks by vascular and infectious disease on follow-up.  Today, he says that his right leg pain is much better, and even though the wound VAC is ongoing, it is probably helping.  He denies fever or chills.  He has ongoing numbness/tingling at baseline.  He denies new shortness of breath, cough, headache, dizziness and says his appetite is improved.  He denies any nausea or heartburn.    Past Medical and Surgical History reviewed in Epic today.  MEDICATIONS:  Current Outpatient Medications   Medication Sig Dispense Refill     acetaminophen (TYLENOL) 325 MG tablet 1000 mg BID scheduled and 650 mg bid prn       albuterol (PROAIR HFA/PROVENTIL HFA/VENTOLIN HFA) 108 (90 Base) MCG/ACT inhaler Inhale 2 puffs into the lungs 2 times daily as needed       clopidogrel (PLAVIX) 75 MG tablet Take 1 tablet (75 mg) by mouth daily Start taking medication the day after the procedure. 90 tablet 1     DAPTOmycin (CUBICIN) 500 MG injection Inject 10 mLs (500 mg) into the vein daily for 23 days Last day 8/4/23 230 mL 0     dulaglutide (TRULICITY) 0.75 MG/0.5ML pen Inject 0.75 mg Subcutaneous every 7 days       furosemide (LASIX) 40 MG tablet Take 40 mg by mouth daily       gabapentin (NEURONTIN) 100 MG capsule Take 200 mg by mouth 2 times daily       ketoconazole (NIZORAL) 2 % external shampoo Apply topically twice a week Mon and Fri.       losartan (COZAAR) 25 MG tablet Take 12.5 mg by mouth daily       mineral oil-hydrophilic petrolatum (AQUAPHOR) external ointment Apply topically 2 times daily    "    omeprazole (PRILOSEC) 20 MG CR capsule Take 20 mg by mouth daily       piperacillin-tazobactam (ZOSYN) 3-0.375 GM vial Inject 3.375 g into the vein every 8 hours 4 hour infusion 1890 mL 0     polyethylene glycol (MIRALAX) 17 g packet Take 1 packet by mouth daily       rivaroxaban ANTICOAGULANT (XARELTO) 15 MG TABS tablet Take 15 mg by mouth 2 times daily X 21 days, then change to 20mg/day       senna-docusate (SENOKOT-S/PERICOLACE) 8.6-50 MG tablet Take 1 tablet by mouth 2 times daily And BID PRN       simvastatin (ZOCOR) 40 MG tablet Take 40 mg by mouth At Bedtime       spironolactone (ALDACTONE) 25 MG tablet Take 25 mg by mouth daily       tamsulosin (FLOMAX) 0.4 MG capsule Take 0.4 mg by mouth daily       Objective: /56   Pulse 62   Temp 98.3  F (36.8  C)   Resp 18   Ht 1.778 m (5' 10\")   Wt 84.4 kg (186 lb)   SpO2 96%   BMI 26.69 kg/m     BGs:    Exam:  GENERAL APPEARANCE: Alert, in no distress, cooperative.   RESP: Respiratory effort good, no respiratory distress, On RA.   CV: Edema 1+ BLE.  Skin/MSK: BLE wrapped w/ wound vac on RLE.   PSYCH: Insight, judgement, and memory are baseline impaired, affect and mood are happy/engaged.    Labs: Labs done in facility are in EPIC. Please refer to them using [facility]/Care Everywhere.    ASSESSMENT/PLAN:  S/P foot surgery, right  Osteomyelitis of right foot, unspecified type (H)  PAD (peripheral artery disease) (H)  Acute deep vein thrombosis (DVT) of right peroneal vein (H)  Polyneuropathy due to type 2 diabetes mellitus (H)  Encounter for management of wound VAC  Type 2 diabetes mellitus with other circulatory complication, with long-term current use of insulin (H)  Acute on chronic. Ongoing.    Follow-up with surgeon and infectious disease as already indicated.    DVT likely resolved/resolving, and Ever has returned to routine Xarelto dosing.    Diabetes appears well controlled, and no need for frequent Accu-Cheks.  It appears Ever tolerated glipizide " discontinuation.  Will reduce blood glucose checking, as there is less concern for hypoglycemia now.  Follow up w/in 1 week or as needed.    Orders:  1. Decrease BG to QAM. Dx: DM II    Electronically signed by:  Dr. Yanna Dozier, APRN CNP DNP

## 2023-07-14 ENCOUNTER — LAB REQUISITION (OUTPATIENT)
Dept: LAB | Facility: CLINIC | Age: 78
End: 2023-07-14
Payer: MEDICARE

## 2023-07-14 DIAGNOSIS — L08.9 LOCAL INFECTION OF THE SKIN AND SUBCUTANEOUS TISSUE, UNSPECIFIED: ICD-10-CM

## 2023-07-14 LAB
ALBUMIN SERPL BCG-MCNC: 3.7 G/DL (ref 3.5–5.2)
ALP SERPL-CCNC: 76 U/L (ref 40–129)
ALT SERPL W P-5'-P-CCNC: 8 U/L (ref 0–70)
ANION GAP SERPL CALCULATED.3IONS-SCNC: 13 MMOL/L (ref 7–15)
AST SERPL W P-5'-P-CCNC: 16 U/L (ref 0–45)
BASOPHILS # BLD AUTO: 0.1 10E3/UL (ref 0–0.2)
BASOPHILS NFR BLD AUTO: 1 %
BILIRUB SERPL-MCNC: 0.3 MG/DL
BUN SERPL-MCNC: 18.3 MG/DL (ref 8–23)
CALCIUM SERPL-MCNC: 9.1 MG/DL (ref 8.8–10.2)
CHLORIDE SERPL-SCNC: 104 MMOL/L (ref 98–107)
CK SERPL-CCNC: 149 U/L (ref 39–308)
CREAT SERPL-MCNC: 1.08 MG/DL (ref 0.67–1.17)
CRP SERPL-MCNC: 34.86 MG/L
DEPRECATED HCO3 PLAS-SCNC: 23 MMOL/L (ref 22–29)
EOSINOPHIL # BLD AUTO: 0.6 10E3/UL (ref 0–0.7)
EOSINOPHIL NFR BLD AUTO: 11 %
ERYTHROCYTE [DISTWIDTH] IN BLOOD BY AUTOMATED COUNT: 15.9 % (ref 10–15)
GFR SERPL CREATININE-BSD FRML MDRD: 70 ML/MIN/1.73M2
GLUCOSE SERPL-MCNC: 187 MG/DL (ref 70–99)
HCT VFR BLD AUTO: 31 % (ref 40–53)
HGB BLD-MCNC: 9.4 G/DL (ref 13.3–17.7)
IMM GRANULOCYTES # BLD: 0 10E3/UL
IMM GRANULOCYTES NFR BLD: 0 %
LYMPHOCYTES # BLD AUTO: 1 10E3/UL (ref 0.8–5.3)
LYMPHOCYTES NFR BLD AUTO: 19 %
MCH RBC QN AUTO: 24.7 PG (ref 26.5–33)
MCHC RBC AUTO-ENTMCNC: 30.3 G/DL (ref 31.5–36.5)
MCV RBC AUTO: 82 FL (ref 78–100)
MONOCYTES # BLD AUTO: 0.5 10E3/UL (ref 0–1.3)
MONOCYTES NFR BLD AUTO: 9 %
NEUTROPHILS # BLD AUTO: 3.2 10E3/UL (ref 1.6–8.3)
NEUTROPHILS NFR BLD AUTO: 60 %
NRBC # BLD AUTO: 0 10E3/UL
NRBC BLD AUTO-RTO: 0 /100
PLATELET # BLD AUTO: 212 10E3/UL (ref 150–450)
POTASSIUM SERPL-SCNC: 3.8 MMOL/L (ref 3.4–5.3)
PROT SERPL-MCNC: 6.8 G/DL (ref 6.4–8.3)
RBC # BLD AUTO: 3.8 10E6/UL (ref 4.4–5.9)
SODIUM SERPL-SCNC: 140 MMOL/L (ref 136–145)
WBC # BLD AUTO: 5.3 10E3/UL (ref 4–11)

## 2023-07-14 PROCEDURE — 80053 COMPREHEN METABOLIC PANEL: CPT | Mod: ORL | Performed by: FAMILY MEDICINE

## 2023-07-14 PROCEDURE — 82550 ASSAY OF CK (CPK): CPT | Mod: ORL | Performed by: FAMILY MEDICINE

## 2023-07-14 PROCEDURE — 85025 COMPLETE CBC W/AUTO DIFF WBC: CPT | Mod: ORL | Performed by: FAMILY MEDICINE

## 2023-07-14 PROCEDURE — 86140 C-REACTIVE PROTEIN: CPT | Mod: ORL | Performed by: FAMILY MEDICINE

## 2023-07-14 PROCEDURE — 36415 COLL VENOUS BLD VENIPUNCTURE: CPT | Mod: ORL | Performed by: FAMILY MEDICINE

## 2023-07-19 NOTE — PROGRESS NOTES
[facility]  Episodic/Acute/Follow-Up  [place] MRN: [id number]. Place of Service where encounter took place:  [facility] ([facility]) [4002]   Chief Complaint   Patient presents with     RECHECK    HPI: Ever Crane  is a 78 year old (1945), who is being seen today for an episodic care visit. Today's concern is:    Ever seen today on routine follow-up as he continues to rehab in [facility].  He has no new complaints and is starting a painting craft project.  He again denies any right lower extremity pain, and he denies new numbness or tingling.  He is eliminating juice from his diet, because he wants to see if that helps with his blood sugars (which have overall been well controlled).  He denies nausea, headaches, shortness of breath, fevers.  He sees his surgeon next week on follow-up.    Past Medical and Surgical History reviewed in Epic today.  MEDICATIONS:  Current Outpatient Medications   Medication Sig Dispense Refill     acetaminophen (TYLENOL) 325 MG tablet 1000 mg BID scheduled and 650 mg bid prn       albuterol (PROAIR HFA/PROVENTIL HFA/VENTOLIN HFA) 108 (90 Base) MCG/ACT inhaler Inhale 2 puffs into the lungs 2 times daily as needed       clopidogrel (PLAVIX) 75 MG tablet Take 1 tablet (75 mg) by mouth daily Start taking medication the day after the procedure. 90 tablet 1     DAPTOmycin (CUBICIN) 500 MG injection Inject 10 mLs (500 mg) into the vein daily for 23 days Last day 8/4/23 230 mL 0     dulaglutide (TRULICITY) 0.75 MG/0.5ML pen Inject 0.75 mg Subcutaneous every 7 days       furosemide (LASIX) 40 MG tablet Take 40 mg by mouth daily       gabapentin (NEURONTIN) 100 MG capsule Take 200 mg by mouth 2 times daily       ketoconazole (NIZORAL) 2 % external shampoo Apply topically twice a week Mon and Fri.       losartan (COZAAR) 25 MG tablet Take 12.5 mg by mouth daily       mineral oil-hydrophilic petrolatum (AQUAPHOR) external ointment Apply topically 2 times daily        omeprazole (PRILOSEC) 20 MG CR capsule Take 20 mg by mouth daily       piperacillin-tazobactam (ZOSYN) 3-0.375 GM vial Inject 3.375 g into the vein every 8 hours 4 hour infusion 1890 mL 0     polyethylene glycol (MIRALAX) 17 g packet Take 1 packet by mouth daily       rivaroxaban ANTICOAGULANT (XARELTO) 15 MG TABS tablet Take 20 mg by mouth daily       senna-docusate (SENOKOT-S/PERICOLACE) 8.6-50 MG tablet Take 1 tablet by mouth 2 times daily And BID PRN       simvastatin (ZOCOR) 40 MG tablet Take 40 mg by mouth At Bedtime       spironolactone (ALDACTONE) 25 MG tablet Take 25 mg by mouth daily       tamsulosin (FLOMAX) 0.4 MG capsule Take 0.4 mg by mouth daily       Objective: /63   Pulse 80   Temp 98.1  F (36.7  C)   Resp 18   Wt 83.9 kg (184 lb 14.4 oz)   SpO2 95%   BMI 26.53 kg/m     BGs:    Exam:  GENERAL APPEARANCE: Alert, in no distress, cooperative.   RESP: Respiratory effort good, no respiratory distress, On RA.   PSYCH: Insight, judgement, and memory are forgetful at baseline, affect and mood are happy/engaged.    Labs: Labs done in facility are in EPIC. Please refer to them using [facility]/Care Everywhere.    ASSESSMENT/PLAN:  S/P foot surgery, right  Osteomyelitis of right foot, unspecified type (H)  PAD (peripheral artery disease) (H)  H/O deep venous thrombosis  Polyneuropathy due to type 2 diabetes mellitus (H)  Encounter for management of wound VAC  Type 2 diabetes mellitus with other circulatory complication, with long-term current use of insulin (H)  Acute on chronic.  Ongoing.    Postoperatively doing well and maintains on IV antibiotics for osteomyelitis.    History of DVT, which is likely resolved and patient is on maintenance Xarelto.    Polyneuropathy likely why patient does not experience pain, even with wound VAC changes and therapy.    Blood glucose readings are controlled, and patient is careful about his diet.  We will continue plan of care.  Follow up w/in 1 to 2 weeks or as  needed.    Orders:  No new orders.    Electronically signed by:  Dr. Yanna Dozier, APRN CNP DNP

## 2023-07-20 ENCOUNTER — LAB REQUISITION (OUTPATIENT)
Dept: LAB | Facility: CLINIC | Age: 78
End: 2023-07-20
Payer: MEDICARE

## 2023-07-20 ENCOUNTER — TRANSITIONAL CARE UNIT VISIT (OUTPATIENT)
Dept: GERIATRICS | Facility: CLINIC | Age: 78
End: 2023-07-20
Payer: MEDICARE

## 2023-07-20 VITALS
OXYGEN SATURATION: 95 % | SYSTOLIC BLOOD PRESSURE: 135 MMHG | HEART RATE: 80 BPM | BODY MASS INDEX: 26.53 KG/M2 | TEMPERATURE: 98.1 F | RESPIRATION RATE: 18 BRPM | WEIGHT: 184.9 LBS | DIASTOLIC BLOOD PRESSURE: 63 MMHG

## 2023-07-20 DIAGNOSIS — L08.9 LOCAL INFECTION OF THE SKIN AND SUBCUTANEOUS TISSUE, UNSPECIFIED: ICD-10-CM

## 2023-07-20 DIAGNOSIS — M86.9 OSTEOMYELITIS OF RIGHT FOOT, UNSPECIFIED TYPE (H): ICD-10-CM

## 2023-07-20 DIAGNOSIS — Z98.890 S/P FOOT SURGERY, RIGHT: Primary | ICD-10-CM

## 2023-07-20 DIAGNOSIS — I73.9 PAD (PERIPHERAL ARTERY DISEASE) (H): ICD-10-CM

## 2023-07-20 DIAGNOSIS — Z79.4 TYPE 2 DIABETES MELLITUS WITH OTHER CIRCULATORY COMPLICATION, WITH LONG-TERM CURRENT USE OF INSULIN (H): ICD-10-CM

## 2023-07-20 DIAGNOSIS — L97.421 NON-PRESSURE CHRONIC ULCER OF LEFT HEEL AND MIDFOOT LIMITED TO BREAKDOWN OF SKIN (H): ICD-10-CM

## 2023-07-20 DIAGNOSIS — Z86.718 H/O DEEP VENOUS THROMBOSIS: ICD-10-CM

## 2023-07-20 DIAGNOSIS — E11.59 TYPE 2 DIABETES MELLITUS WITH OTHER CIRCULATORY COMPLICATION, WITH LONG-TERM CURRENT USE OF INSULIN (H): ICD-10-CM

## 2023-07-20 DIAGNOSIS — Z46.89 ENCOUNTER FOR MANAGEMENT OF WOUND VAC: ICD-10-CM

## 2023-07-20 DIAGNOSIS — E11.42 POLYNEUROPATHY DUE TO TYPE 2 DIABETES MELLITUS (H): ICD-10-CM

## 2023-07-20 PROCEDURE — 99309 SBSQ NF CARE MODERATE MDM 30: CPT | Performed by: NURSE PRACTITIONER

## 2023-07-20 NOTE — LETTER
7/20/2023        RE: Ever Crane  [address]  [address]  [place] [address]        [facility]IATRIC SERV[facility]  Episodic/Acute/Follow-Up  [place] MRN: [id number]. Place of Service where encounter took place:  [facility] [place] [[id number]]   Chief Complaint   Patient presents with     RECHECK    HPI: Ever Crane  is a 78 year old (1945), who is being seen today for an episodic care visit. Today's concern is:    Ever seen today on routine follow-up as he continues to rehab in [facility].  He has no new complaints and is starting a painting craft project.  He again denies any right lower extremity pain, and he denies new numbness or tingling.  He is eliminating juice from his diet, because he wants to see if that helps with his blood sugars (which have overall been well controlled).  He denies nausea, headaches, shortness of breath, fevers.  He sees his surgeon next week on follow-up.    Past Medical and Surgical History reviewed in Epic today.  MEDICATIONS:  Current Outpatient Medications   Medication Sig Dispense Refill     acetaminophen (TYLENOL) 325 MG tablet 1000 mg BID scheduled and 650 mg bid prn       albuterol (PROAIR HFA/PROVENTIL HFA/VENTOLIN HFA) 108 (90 Base) MCG/ACT inhaler Inhale 2 puffs into the lungs 2 times daily as needed       clopidogrel (PLAVIX) 75 MG tablet Take 1 tablet (75 mg) by mouth daily Start taking medication the day after the procedure. 90 tablet 1     DAPTOmycin (CUBICIN) 500 MG injection Inject 10 mLs (500 mg) into the vein daily for 23 days Last day 8/4/23 230 mL 0     dulaglutide (TRULICITY) 0.75 MG/0.5ML pen Inject 0.75 mg Subcutaneous every 7 days       furosemide (LASIX) 40 MG tablet Take 40 mg by mouth daily       gabapentin (NEURONTIN) 100 MG capsule Take 200 mg by mouth 2 times daily       ketoconazole (NIZORAL) 2 % external shampoo Apply topically twice a week Mon and Fri.       losartan (COZAAR) 25 MG tablet Take 12.5 mg by mouth daily       mineral  oil-hydrophilic petrolatum (AQUAPHOR) external ointment Apply topically 2 times daily       omeprazole (PRILOSEC) 20 MG CR capsule Take 20 mg by mouth daily       piperacillin-tazobactam (ZOSYN) 3-0.375 GM vial Inject 3.375 g into the vein every 8 hours 4 hour infusion 1890 mL 0     polyethylene glycol (MIRALAX) 17 g packet Take 1 packet by mouth daily       rivaroxaban ANTICOAGULANT (XARELTO) 15 MG TABS tablet Take 20 mg by mouth daily       senna-docusate (SENOKOT-S/PERICOLACE) 8.6-50 MG tablet Take 1 tablet by mouth 2 times daily And BID PRN       simvastatin (ZOCOR) 40 MG tablet Take 40 mg by mouth At Bedtime       spironolactone (ALDACTONE) 25 MG tablet Take 25 mg by mouth daily       tamsulosin (FLOMAX) 0.4 MG capsule Take 0.4 mg by mouth daily       Objective: /63   Pulse 80   Temp 98.1  F (36.7  C)   Resp 18   Wt 83.9 kg (184 lb 14.4 oz)   SpO2 95%   BMI 26.53 kg/m     BGs:    Exam:  GENERAL APPEARANCE: Alert, in no distress, cooperative.   RESP: Respiratory effort good, no respiratory distress, On RA.   PSYCH: Insight, judgement, and memory are forgetful at baseline, affect and mood are happy/engaged.    Labs: Labs done in facility are in EPIC. Please refer to them using [employer]/Care Everywhere.    ASSESSMENT/PLAN:  S/P foot surgery, right  Osteomyelitis of right foot, unspecified type (H)  PAD (peripheral artery disease) (H)  H/O deep venous thrombosis  Polyneuropathy due to type 2 diabetes mellitus (H)  Encounter for management of wound VAC  Type 2 diabetes mellitus with other circulatory complication, with long-term current use of insulin (H)  Acute on chronic.  Ongoing.    Postoperatively doing well and maintains on IV antibiotics for osteomyelitis.    History of DVT, which is likely resolved and patient is on maintenance Xarelto.    Polyneuropathy likely why patient does not experience pain, even with wound VAC changes and therapy.    Blood glucose readings are controlled, and patient is  careful about his diet.  We will continue plan of care.  Follow up w/in 1 to 2 weeks or as needed.    Orders:  No new orders.    Electronically signed by:  KEVIN Wen CNP DNP          Sincerely,        KEVIN Bansal CNP

## 2023-07-21 LAB
ALBUMIN SERPL BCG-MCNC: 4.1 G/DL (ref 3.5–5.2)
ALP SERPL-CCNC: 93 U/L (ref 40–129)
ALT SERPL W P-5'-P-CCNC: 11 U/L (ref 0–70)
ANION GAP SERPL CALCULATED.3IONS-SCNC: 13 MMOL/L (ref 7–15)
AST SERPL W P-5'-P-CCNC: 21 U/L (ref 0–45)
BASOPHILS # BLD AUTO: 0.1 10E3/UL (ref 0–0.2)
BASOPHILS NFR BLD AUTO: 1 %
BILIRUB SERPL-MCNC: 0.5 MG/DL
BUN SERPL-MCNC: 29.2 MG/DL (ref 8–23)
CALCIUM SERPL-MCNC: 9.9 MG/DL (ref 8.8–10.2)
CHLORIDE SERPL-SCNC: 102 MMOL/L (ref 98–107)
CK SERPL-CCNC: 224 U/L (ref 39–308)
CREAT SERPL-MCNC: 1.2 MG/DL (ref 0.67–1.17)
CRP SERPL-MCNC: 15.33 MG/L
DEPRECATED HCO3 PLAS-SCNC: 25 MMOL/L (ref 22–29)
EOSINOPHIL # BLD AUTO: 0.5 10E3/UL (ref 0–0.7)
EOSINOPHIL NFR BLD AUTO: 7 %
ERYTHROCYTE [DISTWIDTH] IN BLOOD BY AUTOMATED COUNT: 15.4 % (ref 10–15)
GFR SERPL CREATININE-BSD FRML MDRD: 62 ML/MIN/1.73M2
GLUCOSE SERPL-MCNC: 118 MG/DL (ref 70–99)
HCT VFR BLD AUTO: 35 % (ref 40–53)
HGB BLD-MCNC: 10.7 G/DL (ref 13.3–17.7)
IMM GRANULOCYTES # BLD: 0 10E3/UL
IMM GRANULOCYTES NFR BLD: 0 %
LYMPHOCYTES # BLD AUTO: 1.2 10E3/UL (ref 0.8–5.3)
LYMPHOCYTES NFR BLD AUTO: 16 %
MCH RBC QN AUTO: 24.5 PG (ref 26.5–33)
MCHC RBC AUTO-ENTMCNC: 30.6 G/DL (ref 31.5–36.5)
MCV RBC AUTO: 80 FL (ref 78–100)
MONOCYTES # BLD AUTO: 0.5 10E3/UL (ref 0–1.3)
MONOCYTES NFR BLD AUTO: 7 %
NEUTROPHILS # BLD AUTO: 5 10E3/UL (ref 1.6–8.3)
NEUTROPHILS NFR BLD AUTO: 69 %
NRBC # BLD AUTO: 0 10E3/UL
NRBC BLD AUTO-RTO: 0 /100
PLATELET # BLD AUTO: 234 10E3/UL (ref 150–450)
POTASSIUM SERPL-SCNC: 4.1 MMOL/L (ref 3.4–5.3)
PROT SERPL-MCNC: 7.8 G/DL (ref 6.4–8.3)
RBC # BLD AUTO: 4.36 10E6/UL (ref 4.4–5.9)
SODIUM SERPL-SCNC: 140 MMOL/L (ref 136–145)
WBC # BLD AUTO: 7.3 10E3/UL (ref 4–11)

## 2023-07-21 PROCEDURE — 82550 ASSAY OF CK (CPK): CPT | Mod: ORL | Performed by: FAMILY MEDICINE

## 2023-07-21 PROCEDURE — 85025 COMPLETE CBC W/AUTO DIFF WBC: CPT | Mod: ORL | Performed by: FAMILY MEDICINE

## 2023-07-21 PROCEDURE — 36415 COLL VENOUS BLD VENIPUNCTURE: CPT | Mod: ORL | Performed by: FAMILY MEDICINE

## 2023-07-21 PROCEDURE — P9603 ONE-WAY ALLOW PRORATED MILES: HCPCS | Mod: ORL | Performed by: FAMILY MEDICINE

## 2023-07-21 PROCEDURE — 86140 C-REACTIVE PROTEIN: CPT | Mod: ORL | Performed by: FAMILY MEDICINE

## 2023-07-21 PROCEDURE — 80053 COMPREHEN METABOLIC PANEL: CPT | Mod: ORL | Performed by: FAMILY MEDICINE

## 2023-07-27 ENCOUNTER — OFFICE VISIT (OUTPATIENT)
Dept: VASCULAR SURGERY | Facility: CLINIC | Age: 78
End: 2023-07-27
Attending: PODIATRIST
Payer: MEDICARE

## 2023-07-27 ENCOUNTER — LAB REQUISITION (OUTPATIENT)
Dept: LAB | Facility: CLINIC | Age: 78
End: 2023-07-27
Payer: MEDICARE

## 2023-07-27 VITALS — DIASTOLIC BLOOD PRESSURE: 73 MMHG | SYSTOLIC BLOOD PRESSURE: 139 MMHG | OXYGEN SATURATION: 97 % | HEART RATE: 67 BPM

## 2023-07-27 DIAGNOSIS — L97.421 ULCER OF LEFT HEEL AND MIDFOOT, LIMITED TO BREAKDOWN OF SKIN (H): ICD-10-CM

## 2023-07-27 DIAGNOSIS — L08.9 LOCAL INFECTION OF THE SKIN AND SUBCUTANEOUS TISSUE, UNSPECIFIED: ICD-10-CM

## 2023-07-27 DIAGNOSIS — L97.514 ULCER OF RIGHT FOOT WITH NECROSIS OF BONE (H): Primary | ICD-10-CM

## 2023-07-27 DIAGNOSIS — L89.623 PRESSURE ULCER OF LEFT HEEL, STAGE 3 (H): ICD-10-CM

## 2023-07-27 DIAGNOSIS — L89.613 PRESSURE ULCER OF RIGHT HEEL, STAGE 3 (H): ICD-10-CM

## 2023-07-27 PROCEDURE — 11042 DBRDMT SUBQ TIS 1ST 20SQCM/<: CPT | Performed by: PODIATRIST

## 2023-07-27 PROCEDURE — 11045 DBRDMT SUBQ TISS EACH ADDL: CPT | Performed by: PODIATRIST

## 2023-07-27 ASSESSMENT — PAIN SCALES - GENERAL: PAINLEVEL: MODERATE PAIN (5)

## 2023-07-27 NOTE — PATIENT INSTRUCTIONS
Important lnstructions    HOLD WOUND VAC.    WEIGHT BEARING STATUS: You are to remain NON WEIGHT BEARING on your right foot. NON WEIGHT BEARING MEANS NO PRESSURE ON YOUR FOOT OR HEEL AT ANY TIME FOR ANY REASON!    2. OFFLOADING DEVICE: Must use a A WHEELCHAIR at all times! (do not use affected foot to push wheelchair)    3. STABILIZATION DEVICE: Use a PRAFO BOOT BILATERALLY. You will need to WEAR THIS AT ALL TIMES EVEN WHILE IN BED.     4. ELEVATE: Elevating your leg means laying with your head on a pillow and your foot ABOVE YOUR HEART.     5. DO NOT MOVE YOUR FOOT.  There is a risk of worsening the wound or incision. To give yourself a higher chance of healing, please DO NOT swing foot back and forth and wiggle foot/toes especially when inside a stabilization device. Limited movement is allowable with therapy as recommended by the doctor.     6. TAKE A PROTEIN SHAKE TWICE A DAY.  (For ex: Boost, Ensure, Glucerna)      Dressing Change lnstructions:    BILATERAL HEEL AND RIGHT DISTAL FOOT WOUNDS:    - Dressing Application of  Endoform for bilateral heel & right distal foot wounds:    1. Endoform should be cut to the size of the wound.  It should touch the edges of the ulcer. It is okay if it overlap the edges a small amount.  Then, moisten the Endoform with saline.(If it is easier for you, you can moisten it before laying it in the wound)    2. Cover the wound with Endoform, followed by dry gauze, and secure with roll gauze if needed.     3. Endoform is naturally used by the body over time so you may not find it in the wound when you change your dressing.  If you do find some, gently cleanse the wound with saline. Do not remove the remaining Endoform, which may appear as an off-white to max gel, just add Endoform on top.  Usually, more Endoform will need to be added every 5-7 days.     4. Change your top dressing every 1-2 days or as needed depending on drainage.    -Endoform is a collagen dressing created from  ovine (sheep) fore-stomach tissue. The collagen extracellular matrix transforms into a soft conforming sheet, which is naturally incorporated into the wound over time.  Collagen dressings are used to stimulate wound healing.   ,      It IS NOT ok to get your wound wet in the bath or shower    SEEK MEDICAL CARE IF:  You have an increase in swelling, pain, or redness around the wound.  You have an increase in the amount of pus coming from the wound.  There is a bad smell coming from the wound.  The wound appears to be worsening/enlarging  You have a fever greater than 101.5 F      It is ok to continue current wound care treatment/products for the next 2-3 days until new wound care supplies are ordered and arrive. If longer than this please contact our office at [phone].        We want to hear from you!   In the next few weeks, you should receive a call or email to complete a survey about your visit at [facility]. Please help us improve your appointment experience by letting us know how we did today. We strive to make your experience good and value any ways in which we could do better.      We value your input and suggestions.    Thank you for choosing the [facility]!

## 2023-07-27 NOTE — PROGRESS NOTES
FOOT AND ANKLE SURGERY/PODIATRY Progress Note      ASSESSMENT:   Ulceration right foot  Osteomyelitis calcaneus right   Ulceration right heel  Ulceration left heel      TREATMENT:  -I discussed with the patient and his sister today that the right heel ulceration has decreased out from the previous visit and I recommend we discontinue use of the wound VAC at this time.    -Remaining ulcerations on both feet are measuring smaller today and I recommend we continue with endoform at all locations.    -Discussed importance of nonweightbearing and avoiding use of his feet and heels to propel his wheelchair.    -After discussion of risk factors, nursing staff removed dressing, cleansed wound and consent obtained 2% Lidocaine HCL jelly was applied, under clean conditions, the right foot and bilateral heel ulceration(s) were debrided using currette.  Devitalized and nonviable tissue, along with any fibrin and slough, was removed to improve granulation tissue formation, stimulate wound healing, decrease overall bacteria load, disrupt biofilm formation and decrease edge senescence. Wound drainage was scant No. Total excisional debridement was 72 sq cm into the subcutaneous tissue with a depth of 0.2 cm.   Ulcers were improved afterwards and .  Measures were as noted on the flow sheet. Collagen with a gauze dresssing was applied. He will continue to apply Collagen with a gauze dresssing as directed.     -He will follow-up in 3 weeks    Miguelangel Spears DPM  [facility] [place]      HPI: Ever Crane was seen again today for a right foot ulceration and bilateral heel ulcerations.  Patient admits moving his wheelchair with both heels despite recommendation to avoid this activity.  He has used a wound VAC as directed.    Past Medical History:   Diagnosis Date    A-fib (H)     MESHA (acute kidney injury) (H)     Anemia     Atopic keratoconjunctivitis     Atrial fibrillation (H)     Brian Moe: 8/2011  Cardioversion; CHADS2 VASC = 5; he is on warfarin and sotalol     Atrial flutter (H)     Mcgarry's esophagus 01/01/2012    per note of Dr. Tavo Mike of [facility]    Bilateral lower leg cellulitis 08/31/2018    BPH (benign prostatic hyperplasia)     Candidiasis of perineum 01/03/2018    Carotid stenosis     Carotid stenosis, asymptomatic, right     Cellulitis     CHF (congestive heart failure) (H)     Cholecystitis, acute 08/18/2019    Chronic systolic heart failure (H)     Chronic venous stasis dermatitis     Closed nondisplaced intertrochanteric fracture of left femur with routine healing, subsequent encounter 05/18/2022    Clostridium difficile colitis     COPD (chronic obstructive pulmonary disease) (H)     Coronary artery disease due to lipid rich plaque 2000    CABG x2    Coronary atherosclerosis     Diabetes (H)     Diabetic ulcer of both feet (H) 10/31/2017    Dyslexia     Dyslipidemia, goal LDL below 70 2000    Epistaxis     Essential hypertension     Gangrene of left foot (H)     GERD (gastroesophageal reflux disease)     HLD (hyperlipidemia)     HTN (hypertension)     Hyponatremia     Ischemic heart disease     Lymphedema     Mild cognitive impairment     MRSA (methicillin resistant Staphylococcus aureus)     Neuropathy     Non-STEMI (non-ST elevated myocardial infarction) (H)     Occlusion and stenosis of right carotid artery     Osteoarthrosis     Osteomyelitis of ankle and foot (H)     Other atopic dermatitis     PAD (peripheral artery disease) (H)     Peripheral vascular disease (H)     Pneumonia 06/26/2018    Polyneuropathy due to type 2 diabetes mellitus (H)     Pressure ulcer, heel     Bilateral    Renal insufficiency     Type 2 diabetes mellitus, without long-term current use of insulin (H)     Ulcer of right foot (H)     Unable to function independently 11/13/2017       Past Surgical History:   Procedure Laterality Date    AMPUTATE FOOT Left 11/05/2017    Procedure: LEFT TRANSMETATARSAL AMPUTATION;   Surgeon: Ever Wick MD;  Location: [facility];  Service:     AMPUTATE FOOT Right 4/27/2023    Procedure: Transmetatarsal amputation right foot;  Surgeon: Miguelangel Spears DPM;  Location: [facility]    AMPUTATE FOOT Right 5/15/2023    Procedure: partial calcanectomy;  Surgeon: Miguelangel Spears DPM;  Location: [facility]    BYPASS GRAFT ARTERY CORONARY N/A 03/06/2000    SVG to OM1, SVG to PDA    BYPASS GRAFT ARTERY CORONARY N/A 10/04/2018    redo CABG due to graft failure    CABG MEASURES GRP      CARDIOVERSION  08/25/2011    CV CORONARY ANGIOGRAM N/A 10/01/2018    Procedure: Coronary Angiogram;  Surgeon: Miki Mac MD;  Location: [facility];  Service:     INCISION AND DRAINAGE FOOT, COMBINED Right 5/15/2023    Procedure: INCISION AND DRAINAGE, right heel with,;  Surgeon: Miguelangel Spears DPM;  Location: [facility]    INCISION AND DRAINAGE FOOT, COMBINED Bilateral 6/1/2023    Procedure: INCISION AND DRAINAGE, right foot with debridement of ulceration bilateral heels;  Surgeon: Miguelangel Speasr DPM;  Location: [facility]    INGUINAL HERNIA REPAIR Bilateral 1969    and 1979    IR EXTREMITY ANGIOGRAM BILATERAL  11/3/2017    IR LOWER EXTREMITY ANGIOGRAM LEFT  3/10/2023    IR LOWER EXTREMITY ANGIOGRAM RIGHT  1/24/2023    LAPAROSCOPIC CHOLECYSTECTOMY N/A 08/18/2019    Procedure: CHOLECYSTECTOMY, LAPAROSCOPIC;  Surgeon: Stewart Founatin MD;  Location: [facility];  Service: General    LENGTHEN TENDON ACHILLES Right 4/27/2023    Procedure: with Achilles tendon lengthening, debridement of right heel ulceration.;  Surgeon: Miguelangel Spears DPM;  Location: [facility]       Allergies   Allergen Reactions    Cranberry Extract Itching and Rash    Doxycycline Rash    Latex Rash    Penicillin V Rash     Reaction occurred as a child. Patient tolerated Zosyn 6/2018, Cefazolin 10/2018, and has also tolerated Augmentin.    Penicillins Rash      Reaction occurred as a child. Patient tolerated Zosyn 6/2018, Cefazolin 10/2018, and has also tolerated Augmentin.         Current Outpatient Medications:     acetaminophen (TYLENOL) 325 MG tablet, 1000 mg BID scheduled and 650 mg bid prn, Disp: , Rfl:     albuterol (PROAIR HFA/PROVENTIL HFA/VENTOLIN HFA) 108 (90 Base) MCG/ACT inhaler, Inhale 2 puffs into the lungs 2 times daily as needed, Disp: , Rfl:     clopidogrel (PLAVIX) 75 MG tablet, Take 1 tablet (75 mg) by mouth daily Start taking medication the day after the procedure., Disp: 90 tablet, Rfl: 1    DAPTOmycin (CUBICIN) 500 MG injection, Inject 10 mLs (500 mg) into the vein daily for 23 days Last day 8/4/23, Disp: 230 mL, Rfl: 0    dulaglutide (TRULICITY) 0.75 MG/0.5ML pen, Inject 0.75 mg Subcutaneous every 7 days, Disp: , Rfl:     furosemide (LASIX) 40 MG tablet, Take 40 mg by mouth daily, Disp: , Rfl:     gabapentin (NEURONTIN) 100 MG capsule, Take 200 mg by mouth 2 times daily, Disp: , Rfl:     ketoconazole (NIZORAL) 2 % external shampoo, Apply topically twice a week Mon and Fri., Disp: , Rfl:     losartan (COZAAR) 25 MG tablet, Take 12.5 mg by mouth daily, Disp: , Rfl:     mineral oil-hydrophilic petrolatum (AQUAPHOR) external ointment, Apply topically 2 times daily, Disp: , Rfl:     omeprazole (PRILOSEC) 20 MG CR capsule, Take 20 mg by mouth daily, Disp: , Rfl:     piperacillin-tazobactam (ZOSYN) 3-0.375 GM vial, Inject 3.375 g into the vein every 8 hours 4 hour infusion, Disp: 1890 mL, Rfl: 0    polyethylene glycol (MIRALAX) 17 g packet, Take 1 packet by mouth daily, Disp: , Rfl:     senna-docusate (SENOKOT-S/PERICOLACE) 8.6-50 MG tablet, Take 1 tablet by mouth 2 times daily And BID PRN, Disp: , Rfl:     simvastatin (ZOCOR) 40 MG tablet, Take 40 mg by mouth At Bedtime, Disp: , Rfl:     spironolactone (ALDACTONE) 25 MG tablet, Take 25 mg by mouth daily, Disp: , Rfl:     tamsulosin (FLOMAX) 0.4 MG capsule, Take 0.4 mg by mouth daily, Disp: , Rfl:      rivaroxaban ANTICOAGULANT (XARELTO) 15 MG TABS tablet, Take 20 mg by mouth daily, Disp: , Rfl:     Review of Systems - 10 point Review of Systems is negative except for bilateral foot ulcerations which is noted in HPI.      OBJECTIVE:  /73   Pulse 67   SpO2 97%   General appearance: Patient is alert and fully cooperative with history & exam.  No sign of distress is noted during the visit.    Vascular: Dorsalis pedis non-palpableBilateral.  Dermatologic:    VASC Wound Right heel (Active)   Pre Size Length 4 07/27/23 1400   Pre Size Width 5 07/27/23 1400   Pre Size Depth 0.3 07/27/23 1400   Pre Total Sq cm 20 07/27/23 1400   Post Size Length 9 01/13/23 1300   Post Size Width 9 01/13/23 1300   Post Size Depth 0.1 01/13/23 1300   Post Total Sq cm 81 01/13/23 1300   Description eschar 04/05/23 1000       VASC Wound Left heel (Active)   Pre Size Length 1.8 07/27/23 1400   Pre Size Width 2.3 07/27/23 1400   Pre Size Depth 0.2 07/27/23 1400   Pre Total Sq cm 4.14 07/27/23 1400   Post Size Length 7 01/13/23 1300   Post Size Width 6 01/13/23 1300   Post Size Depth 0.1 01/13/23 1300   Post Total Sq cm 42 01/13/23 1300       VASC Wound Right Distal foot (Active)   Pre Size Length 3 07/27/23 1400   Pre Size Width 16 07/27/23 1400   Pre Size Depth 0.3 07/27/23 1400   Pre Total Sq cm 48 07/27/23 1400       Incision/Surgical Site 06/01/23 Bilateral Foot (Active)   Granular base right foot ulceration and bilateral heel ulcerations.  No erythema bilateral feet.  Neurologic: Diminished to light touch Bilateral.  Musculoskeletal:  TMA bilateral.    Picture:

## 2023-07-28 ENCOUNTER — TELEPHONE (OUTPATIENT)
Dept: GERIATRICS | Facility: CLINIC | Age: 78
End: 2023-07-28

## 2023-07-28 LAB
ALBUMIN SERPL BCG-MCNC: 3.6 G/DL (ref 3.5–5.2)
ALP SERPL-CCNC: 90 U/L (ref 40–129)
ALT SERPL W P-5'-P-CCNC: 12 U/L (ref 0–70)
ANION GAP SERPL CALCULATED.3IONS-SCNC: 12 MMOL/L (ref 7–15)
AST SERPL W P-5'-P-CCNC: 19 U/L (ref 0–45)
BASOPHILS # BLD AUTO: 0.1 10E3/UL (ref 0–0.2)
BASOPHILS NFR BLD AUTO: 1 %
BILIRUB SERPL-MCNC: 0.4 MG/DL
BUN SERPL-MCNC: 25.7 MG/DL (ref 8–23)
CALCIUM SERPL-MCNC: 9.3 MG/DL (ref 8.8–10.2)
CHLORIDE SERPL-SCNC: 103 MMOL/L (ref 98–107)
CK SERPL-CCNC: 190 U/L (ref 39–308)
CREAT SERPL-MCNC: 1.21 MG/DL (ref 0.67–1.17)
CRP SERPL-MCNC: 27.99 MG/L
DEPRECATED HCO3 PLAS-SCNC: 24 MMOL/L (ref 22–29)
EOSINOPHIL # BLD AUTO: 0.5 10E3/UL (ref 0–0.7)
EOSINOPHIL NFR BLD AUTO: 8 %
ERYTHROCYTE [DISTWIDTH] IN BLOOD BY AUTOMATED COUNT: 15.6 % (ref 10–15)
GFR SERPL CREATININE-BSD FRML MDRD: 61 ML/MIN/1.73M2
GLUCOSE SERPL-MCNC: 130 MG/DL (ref 70–99)
HCT VFR BLD AUTO: 33.5 % (ref 40–53)
HGB BLD-MCNC: 10.4 G/DL (ref 13.3–17.7)
IMM GRANULOCYTES # BLD: 0 10E3/UL
IMM GRANULOCYTES NFR BLD: 0 %
LYMPHOCYTES # BLD AUTO: 1.1 10E3/UL (ref 0.8–5.3)
LYMPHOCYTES NFR BLD AUTO: 16 %
MCH RBC QN AUTO: 24.8 PG (ref 26.5–33)
MCHC RBC AUTO-ENTMCNC: 31 G/DL (ref 31.5–36.5)
MCV RBC AUTO: 80 FL (ref 78–100)
MONOCYTES # BLD AUTO: 0.6 10E3/UL (ref 0–1.3)
MONOCYTES NFR BLD AUTO: 8 %
NEUTROPHILS # BLD AUTO: 4.6 10E3/UL (ref 1.6–8.3)
NEUTROPHILS NFR BLD AUTO: 67 %
NRBC # BLD AUTO: 0 10E3/UL
NRBC BLD AUTO-RTO: 0 /100
PLATELET # BLD AUTO: 230 10E3/UL (ref 150–450)
POTASSIUM SERPL-SCNC: 4.1 MMOL/L (ref 3.4–5.3)
PROT SERPL-MCNC: 7.1 G/DL (ref 6.4–8.3)
RBC # BLD AUTO: 4.2 10E6/UL (ref 4.4–5.9)
SODIUM SERPL-SCNC: 139 MMOL/L (ref 136–145)
WBC # BLD AUTO: 6.8 10E3/UL (ref 4–11)

## 2023-07-28 PROCEDURE — 85025 COMPLETE CBC W/AUTO DIFF WBC: CPT | Mod: ORL | Performed by: FAMILY MEDICINE

## 2023-07-28 PROCEDURE — 86140 C-REACTIVE PROTEIN: CPT | Mod: ORL | Performed by: FAMILY MEDICINE

## 2023-07-28 PROCEDURE — P9603 ONE-WAY ALLOW PRORATED MILES: HCPCS | Mod: ORL | Performed by: FAMILY MEDICINE

## 2023-07-28 PROCEDURE — 80053 COMPREHEN METABOLIC PANEL: CPT | Mod: ORL | Performed by: FAMILY MEDICINE

## 2023-07-28 PROCEDURE — 36415 COLL VENOUS BLD VENIPUNCTURE: CPT | Mod: ORL | Performed by: FAMILY MEDICINE

## 2023-07-28 PROCEDURE — 82550 ASSAY OF CK (CPK): CPT | Mod: ORL | Performed by: FAMILY MEDICINE

## 2023-07-28 NOTE — TELEPHONE ENCOUNTER
[facility] Geriatric[facility] Lab Note     Provider: Isabel Stephens MD  Facility: [facility] Facility Type:  TCU    Allergies   Allergen Reactions     Cranberry Extract Itching and Rash     Doxycycline Rash     Latex Rash     Penicillin V Rash     Reaction occurred as a child. Patient tolerated Zosyn 6/2018, Cefazolin 10/2018, and has also tolerated Augmentin.     Penicillins Rash     Reaction occurred as a child. Patient tolerated Zosyn 6/2018, Cefazolin 10/2018, and has also tolerated Augmentin.       Labs Reviewed by provider: Heme 1, CMP, CRP, CK     Verbal Order/Direction given by Provider: Fax labs to ID.  Encourage PO fluid intake.  Check BMP on 8/2/23.      Provider giving Order:  Isabel Stephens MD    Verbal Order given to: Nisha Jarquin RN

## 2023-08-01 ENCOUNTER — LAB REQUISITION (OUTPATIENT)
Dept: LAB | Facility: CLINIC | Age: 78
End: 2023-08-01
Payer: MEDICARE

## 2023-08-01 ENCOUNTER — TRANSITIONAL CARE UNIT VISIT (OUTPATIENT)
Dept: GERIATRICS | Facility: CLINIC | Age: 78
End: 2023-08-01
Payer: MEDICARE

## 2023-08-01 VITALS
WEIGHT: 183 LBS | OXYGEN SATURATION: 92 % | RESPIRATION RATE: 18 BRPM | SYSTOLIC BLOOD PRESSURE: 135 MMHG | HEART RATE: 68 BPM | DIASTOLIC BLOOD PRESSURE: 73 MMHG | TEMPERATURE: 97.9 F | BODY MASS INDEX: 26.26 KG/M2

## 2023-08-01 DIAGNOSIS — I73.9 PAD (PERIPHERAL ARTERY DISEASE) (H): ICD-10-CM

## 2023-08-01 DIAGNOSIS — I69.30 UNSPECIFIED SEQUELAE OF CEREBRAL INFARCTION: ICD-10-CM

## 2023-08-01 DIAGNOSIS — M86.9 OSTEOMYELITIS OF RIGHT FOOT, UNSPECIFIED TYPE (H): Primary | ICD-10-CM

## 2023-08-01 DIAGNOSIS — E11.59 TYPE 2 DIABETES MELLITUS WITH OTHER CIRCULATORY COMPLICATION, WITH LONG-TERM CURRENT USE OF INSULIN (H): ICD-10-CM

## 2023-08-01 DIAGNOSIS — Z79.4 TYPE 2 DIABETES MELLITUS WITH OTHER CIRCULATORY COMPLICATION, WITH LONG-TERM CURRENT USE OF INSULIN (H): ICD-10-CM

## 2023-08-01 DIAGNOSIS — L08.9 LOCAL INFECTION OF THE SKIN AND SUBCUTANEOUS TISSUE, UNSPECIFIED: ICD-10-CM

## 2023-08-01 PROCEDURE — 99309 SBSQ NF CARE MODERATE MDM 30: CPT | Performed by: NURSE PRACTITIONER

## 2023-08-01 NOTE — Clinical Note
Hi Dr. Stephens, I'm not sure why his Plavix was stopped on 6/11/23. Wondering if they stopped it for a procedure and never restarted? Should I restart? Thanks!

## 2023-08-01 NOTE — PROGRESS NOTES
[facility] GERIATRICS  Chief Complaint   Patient presents with    RECHECK     HPI:  Ever Crane is a 78 year old  (1945), who is being seen today for an episodic care visit at: [facility] ([facility]) [[id number]].     Background:    This is a 78-year-old male, with a past medical history significant for type 2 diabetes mellitus, peripheral vascular disease, diabetic ulceration of the 2nd through 5th digits of the right foot, ulceration of bilateral heels, ulceration of left leg, and osteomyelitis of the right foot including the 4th PIP and DIP as well as 5th DIP, who was admitted to [facility] 1/17/23. Since that time, has had the following procedures -    4/27/23 - Transmetatarsal amputation of the right foot with right achilles tendon lengthening and excisional debridement of right heel ulceration   5/15/23 - Incision and drainage of right heel with wound vac placement  5/23/23 - wound vac came off  6/1/23 - Incision and drainage of right foot, exostectomy of the first metatarsal of the right foot, and  debridement of ulceration of bilateral heels with wound vac placement  End of June - Wound vac held due to concerns related to RLE swelling  7/7/23 - Wound vac re-applied  7/27/23 - Wound vac discontinued    Today's concern is:     Right Foot Osteomyelitis S/P Multiple I&D with Wound Vac Placement. Today, patient has no concerns. Denies pain. Per review of documentation, is non-compliant with weight bearing status and self propels in wheelchair.     Type 2 Diabetes Mellitus. Blood sugars checked daily in the morning while in TCU. Over the past 5 days, range 142-198, with a reading of 234 this morning.    Allergies, and PMH/PSH reviewed in EPIC today.  REVIEW OF SYSTEMS:  4 point ROS including Respiratory, CV, GI and , other than that noted in the HPI,  is negative    Objective:   /73   Pulse 68   Temp 97.9  F (36.6  C)   Resp 18   Wt 83 kg (183 lb)   SpO2 92%   BMI 26.26 kg/m     GENERAL APPEARANCE:  Alert, in no distress  ENT:  Mouth and posterior oropharynx normal, moist mucous membranes  EYES:  EOM, conjunctivae, lids, pupils and irises normal  RESP:  no respiratory distress  SKIN:  Dressing in place on right foot  PSYCH:  affect and mood normal    Labs done in SNF are in [place] EPIC. Please refer to them using EPIC/Care Everywhere.    Assessment/Plan:  Right Foot Osteomyelitis S/P Multiple I&D and Wound Vac Placement. Follow-up with Dr. Spears on 8/24/23 as scheduled. Follow-up with Infectious Disease 8/2/23 as scheduled. NWB to RLE. Continue dressing changes as ordered. Acetaminophen, Gabapentin, and Ibuprofen for pain. Consider GDR of Ibuprofen given side effects. Continues on Daptomycin IV. Completed with Piperacillin-Tazobactam on 7/13/23. Weekly labs to be faxed to ID.    Type 2 Diabetes Mellitus. Last A1C 6.6 5/24/23. Glipizide discontinued 7/6/23. Continues on Dulaglutide. Blood sugars checked daily in the morning with most < 200.     Moderate-to-Severe Coronary Artery Disease, Heart Failure with EF 60% 8/17/19, Peripheral Vascular Disease, Hypertension, and Hyperlipidemia. Per review of facility MAR, Clopidogrel has been on hold since 6/11/23. Will need to determine why this was held. Monitor blood pressure daily. Monitor weights. Continue Furosemide, Losartan, Spironolactone, and Simvastatin as ordered.    Right Lower Extremity Perineal Thrombus 6/18/23. Continue Rivaroxaban as ordered.    Atrial Fibrillation. Monitor heart rate daily. Continue Rivaroxaban as ordered.    Questionable Chronic Obstructive Pulmonary Disease. With chronic cough. Continue Albuterol inhaler and nebulizer PRN as ordered.    Constipation. Continue Senna-S as ordered.    Benign Prostatic Hypertrophy. Continue Tamsulosin as ordered.    Orders:  None    Electronically signed by: KEVIN Unger CNP

## 2023-08-01 NOTE — LETTER
8/1/2023        RE: Ever Crane  [address]  [address]  [place] [address]        [facility]  Chief Complaint   Patient presents with     RECHECK     HPI:  Ever Crane is a 78 year old  (1945), who is being seen today for an episodic care visit at: [facility] ([facility]) [[id number]].     Background:    This is a 78-year-old male, with a past medical history significant for type 2 diabetes mellitus, peripheral vascular disease, diabetic ulceration of the 2nd through 5th digits of the right foot, ulceration of bilateral heels, ulceration of left leg, and osteomyelitis of the right foot including the 4th PIP and DIP as well as 5th DIP, who was admitted to [facility] 1/17/23. Since that time, has had the following procedures -    4/27/23 - Transmetatarsal amputation of the right foot with right achilles tendon lengthening and excisional debridement of right heel ulceration   5/15/23 - Incision and drainage of right heel with wound vac placement  5/23/23 - wound vac came off  6/1/23 - Incision and drainage of right foot, exostectomy of the first metatarsal of the right foot, and  debridement of ulceration of bilateral heels with wound vac placement  End of June - Wound vac held due to concerns related to RLE swelling  7/7/23 - Wound vac re-applied  7/27/23 - Wound vac discontinued    Today's concern is:     Right Foot Osteomyelitis S/P Multiple I&D with Wound Vac Placement. Today, patient has no concerns. Denies pain. Per review of documentation, is non-compliant with weight bearing status and self propels in wheelchair.     Type 2 Diabetes Mellitus. Blood sugars checked daily in the morning while in U. Over the past 5 days, range 142-198, with a reading of 234 this morning.    Allergies, and PMH/PSH reviewed in EPIC today.  REVIEW OF SYSTEMS:  4 point ROS including Respiratory, CV, GI and , other than that noted in the HPI,  is negative    Objective:   /73    Pulse 68   Temp 97.9  F (36.6  C)   Resp 18   Wt 83 kg (183 lb)   SpO2 92%   BMI 26.26 kg/m    GENERAL APPEARANCE:  Alert, in no distress  ENT:  Mouth and posterior oropharynx normal, moist mucous membranes  EYES:  EOM, conjunctivae, lids, pupils and irises normal  RESP:  no respiratory distress  SKIN:  Dressing in place on right foot  PSYCH:  affect and mood normal    Labs done in SNF are in [place] EPIC. Please refer to them using EPIC/Care Everywhere.    Assessment/Plan:  Right Foot Osteomyelitis S/P Multiple I&D and Wound Vac Placement. Follow-up with Dr. Spears on 8/24/23 as scheduled. Follow-up with Infectious Disease 8/2/23 as scheduled. NWB to RLE. Continue dressing changes as ordered. Acetaminophen, Gabapentin, and Ibuprofen for pain. Consider GDR of Ibuprofen given side effects. Continues on Daptomycin IV. Completed with Piperacillin-Tazobactam on 7/13/23. Weekly labs to be faxed to ID.    Type 2 Diabetes Mellitus. Last A1C 6.6 5/24/23. Glipizide discontinued 7/6/23. Continues on Dulaglutide. Blood sugars checked daily in the morning with most < 200.     Moderate-to-Severe Coronary Artery Disease, Heart Failure with EF 60% 8/17/19, Peripheral Vascular Disease, Hypertension, and Hyperlipidemia. Per review of facility MAR, Clopidogrel has been on hold since 6/11/23. Will need to determine why this was held. Monitor blood pressure daily. Monitor weights. Continue Furosemide, Losartan, Spironolactone, and Simvastatin as ordered.    Right Lower Extremity Perineal Thrombus 6/18/23. Continue Rivaroxaban as ordered.    Atrial Fibrillation. Monitor heart rate daily. Continue Rivaroxaban as ordered.    Questionable Chronic Obstructive Pulmonary Disease. With chronic cough. Continue Albuterol inhaler and nebulizer PRN as ordered.    Constipation. Continue Senna-S as ordered.    Benign Prostatic Hypertrophy. Continue Tamsulosin as ordered.    Orders:  None    Electronically signed by: Gabriela Keller,  APRN CNP         Sincerely,        KEVIN Unger CNP

## 2023-08-02 ENCOUNTER — VIRTUAL VISIT (OUTPATIENT)
Dept: INFECTIOUS DISEASES | Facility: CLINIC | Age: 78
End: 2023-08-02
Payer: MEDICARE

## 2023-08-02 DIAGNOSIS — M86.9 OSTEOMYELITIS OF ANKLE AND FOOT (H): Primary | ICD-10-CM

## 2023-08-02 LAB
ALBUMIN SERPL BCG-MCNC: 3.9 G/DL (ref 3.5–5.2)
ALP SERPL-CCNC: 104 U/L (ref 40–129)
ALT SERPL W P-5'-P-CCNC: 14 U/L (ref 0–70)
ANION GAP SERPL CALCULATED.3IONS-SCNC: 21 MMOL/L (ref 7–15)
AST SERPL W P-5'-P-CCNC: 21 U/L (ref 0–45)
BASOPHILS # BLD AUTO: 0.1 10E3/UL (ref 0–0.2)
BASOPHILS NFR BLD AUTO: 1 %
BILIRUB SERPL-MCNC: 0.4 MG/DL
BUN SERPL-MCNC: 23.6 MG/DL (ref 8–23)
CALCIUM SERPL-MCNC: 9.5 MG/DL (ref 8.8–10.2)
CHLORIDE SERPL-SCNC: 102 MMOL/L (ref 98–107)
CREAT SERPL-MCNC: 1.22 MG/DL (ref 0.67–1.17)
DEPRECATED HCO3 PLAS-SCNC: 18 MMOL/L (ref 22–29)
EOSINOPHIL # BLD AUTO: 0.6 10E3/UL (ref 0–0.7)
EOSINOPHIL NFR BLD AUTO: 8 %
ERYTHROCYTE [DISTWIDTH] IN BLOOD BY AUTOMATED COUNT: 15.5 % (ref 10–15)
GFR SERPL CREATININE-BSD FRML MDRD: 61 ML/MIN/1.73M2
GLUCOSE SERPL-MCNC: 210 MG/DL (ref 70–99)
HCT VFR BLD AUTO: 37.6 % (ref 40–53)
HGB BLD-MCNC: 11.5 G/DL (ref 13.3–17.7)
IMM GRANULOCYTES # BLD: 0 10E3/UL
IMM GRANULOCYTES NFR BLD: 0 %
LYMPHOCYTES # BLD AUTO: 1.2 10E3/UL (ref 0.8–5.3)
LYMPHOCYTES NFR BLD AUTO: 14 %
MCH RBC QN AUTO: 24.7 PG (ref 26.5–33)
MCHC RBC AUTO-ENTMCNC: 30.6 G/DL (ref 31.5–36.5)
MCV RBC AUTO: 81 FL (ref 78–100)
MONOCYTES # BLD AUTO: 0.4 10E3/UL (ref 0–1.3)
MONOCYTES NFR BLD AUTO: 5 %
NEUTROPHILS # BLD AUTO: 5.7 10E3/UL (ref 1.6–8.3)
NEUTROPHILS NFR BLD AUTO: 72 %
NRBC # BLD AUTO: 0 10E3/UL
NRBC BLD AUTO-RTO: 0 /100
PLATELET # BLD AUTO: 227 10E3/UL (ref 150–450)
POTASSIUM SERPL-SCNC: 4.1 MMOL/L (ref 3.4–5.3)
PROT SERPL-MCNC: 7.5 G/DL (ref 6.4–8.3)
RBC # BLD AUTO: 4.65 10E6/UL (ref 4.4–5.9)
SODIUM SERPL-SCNC: 141 MMOL/L (ref 136–145)
WBC # BLD AUTO: 8 10E3/UL (ref 4–11)

## 2023-08-02 PROCEDURE — P9604 ONE-WAY ALLOW PRORATED TRIP: HCPCS | Mod: ORL | Performed by: FAMILY MEDICINE

## 2023-08-02 PROCEDURE — 80053 COMPREHEN METABOLIC PANEL: CPT | Mod: ORL | Performed by: FAMILY MEDICINE

## 2023-08-02 PROCEDURE — 85025 COMPLETE CBC W/AUTO DIFF WBC: CPT | Mod: ORL | Performed by: FAMILY MEDICINE

## 2023-08-02 PROCEDURE — 99442 PR PHYSICIAN TELEPHONE EVALUATION 11-20 MIN: CPT | Mod: 95 | Performed by: INTERNAL MEDICINE

## 2023-08-02 PROCEDURE — 36415 COLL VENOUS BLD VENIPUNCTURE: CPT | Mod: ORL | Performed by: FAMILY MEDICINE

## 2023-08-02 NOTE — PROGRESS NOTES
[facility]  Chief Complaint   Patient presents with     skilled nursing Regulatory     [facility] Medical Record Number:  [id number]  Place of Service where encounter took place:  [facility] ([facility]) [[id number]]    HPI:    Ever Crane  is 78 year old (1945), who is being seen today for a federally mandated E/M visit.  HPI information obtained from: facility chart records, facility staff, patient report and [facility] chart review.     UPDATES SINCE OUT LAST VISIT IN APRIL  ------------------------------------------------------------  4/27: S/P Transmetatarsal amputation of right foot, Rt  Achillis tendon lengthening, excisional debridement ulceration rt heel. Cx grew MSSA  5/15: Rt calcaneus I &D, Cx grew enterobacter, enterococcus  6/1/23:  debridement wound 1st metatarsal.   7/7: wound debrided and wound vac started.   8/2: ID visit, continue  dapto through 8/4. zosyn completed on 7/13      Today's concerns are:   - Resident seen and examined, chart reviewed and discussed with the nursing staff.   - Rt foot: denies pain  - T2D:  Appetite is fine  - report dull pain over LUQ lasts for a couple of second, for years. No aggravated factors     - DNP and RN have no concern today.   --------------------------------    MEDICATIONS:  Current Outpatient Medications   Medication Sig Dispense Refill     acetaminophen (TYLENOL) 325 MG tablet 1000 mg BID scheduled and 650 mg bid prn       albuterol (PROAIR HFA/PROVENTIL HFA/VENTOLIN HFA) 108 (90 Base) MCG/ACT inhaler Inhale 2 puffs into the lungs 2 times daily as needed       clopidogrel (PLAVIX) 75 MG tablet Take 1 tablet (75 mg) by mouth daily Start taking medication the day after the procedure. 90 tablet 1     DAPTOmycin (CUBICIN) 500 MG injection Inject 10 mLs (500 mg) into the vein daily for 23 days Last day 8/4/23 230 mL 0     dulaglutide (TRULICITY) 0.75 MG/0.5ML pen Inject 0.75 mg Subcutaneous every 7 days       furosemide (LASIX) 40 MG tablet Take  "40 mg by mouth daily       gabapentin (NEURONTIN) 100 MG capsule Take 200 mg by mouth 2 times daily       ketoconazole (NIZORAL) 2 % external shampoo Apply topically twice a week Mon and Fri.       losartan (COZAAR) 25 MG tablet Take 12.5 mg by mouth daily       mineral oil-hydrophilic petrolatum (AQUAPHOR) external ointment Apply topically 2 times daily       omeprazole (PRILOSEC) 20 MG CR capsule Take 20 mg by mouth daily       piperacillin-tazobactam (ZOSYN) 3-0.375 GM vial Inject 3.375 g into the vein every 8 hours 4 hour infusion (Patient not taking: Reported on 8/2/2023) 1890 mL 0     polyethylene glycol (MIRALAX) 17 g packet Take 1 packet by mouth daily       rivaroxaban ANTICOAGULANT (XARELTO) 15 MG TABS tablet Take 20 mg by mouth daily       senna-docusate (SENOKOT-S/PERICOLACE) 8.6-50 MG tablet Take 1 tablet by mouth 2 times daily And BID PRN       simvastatin (ZOCOR) 40 MG tablet Take 40 mg by mouth At Bedtime       spironolactone (ALDACTONE) 25 MG tablet Take 25 mg by mouth daily       tamsulosin (FLOMAX) 0.4 MG capsule Take 0.4 mg by mouth daily       Case Management:  I have reviewed the care plan and MDS and do agree with the plan. Patient's desire to return to the community is present, but is not able due to care needs . Information reviewed:  Medications, vital signs, orders, and nursing notes.    ROS: 4 point ROS including Respiratory, CV, GI and , other than that noted in the HPI,  is negative    Vitals:  /66   Pulse 78   Temp 98.1  F (36.7  C)   Resp 20   Ht 1.778 m (5' 10\")   Wt 83 kg (183 lb)   SpO2 97%   BMI 26.26 kg/m    Body mass index is 26.26 kg/m .  Exam:  GENERAL APPEARANCE:  in no distress,   RESP:  Unlabored breathing. CTA b/l.   CV:  S1S2 audible, regular HR, no murmur appreciated.   ABDOMEN:  soft, NT/ND, tympanic on percussion over epigastric, and LUQ. BS audible.   M/S:   amputated  distal feet  SKIN:  Feet surgically dressed.   NEURO:   No NFD appreciated on " "observation.   PSYCH:  affect and mood normal    Lab/Diagnostic data:  Reviewed in the chart and EHR.          ASSESSMENT/PLAN  --------------------------  (E11.59,  Z79.4) Type 2 diabetes mellitus with other circulatory complication, with long-term current use of insulin (H)  (primary encounter diagnosis)  (E11.42) Polyneuropathy due to type 2 diabetes mellitus (H)   A1C 6.6 05/24/2023    A1C 6.7 05/15/2023   - controlled. On Trulicity.      (I50.30) Heart failure with preserved ejection fraction, NYHA class II (H)  (I48.19) Persistent atrial fibrillation (H)  (I10) Essential hypertension  - cardiac wise compensated.   - CVR, not on rate controlled.   - on Xarelto  for cva ppx.     (J44.9) Chronic obstructive pulmonary disease, unspecified COPD type (H)  - pulmonary wise compensated    (I73.9) PAD (peripheral artery disease) (H)  (Z98.890) S/P foot surgery, right  4/27: S/P Transmetatarsal amputation of right foot, Rt  Achillis tendon lengthening, excisional debridement ulceration rt heel. Cx grew MSSA  5/15: Rt calcaneus I &D, Cx grew enterobacter, enterococcus  6/1/23:  debridement wound 1st metatarsal.   7/7: wound debrided and wound vac started.   8/2: ID visit, continue  dapto through 8/4. zosyn completed on 7/13    Non specified abd pain:  - query 2/2 to flatulence. Drinks plenty of carbonated water \"pops'        Electronically signed by:  Isabel Stephens MD  "

## 2023-08-02 NOTE — PATIENT INSTRUCTIONS
Last day of daptomycin is 8/4/23 and PICC can then be removed.    Keep follow up with Dr. Spears.     Wanger Cr MD

## 2023-08-02 NOTE — PROGRESS NOTES
ASSESSMENT:  1. Remains on treatment for Osteomyelitis R calcaneus. I+D 5/15/23. Cultures with enterobacter, enterococcus, bacteroides, corynebacterium from May 2023.  Repeat debridement 6/1/23 included medial wound into 1st metatarsal. Cultures with enterococcus avium and corynebacterium. These are generally benign skin ariel, but can sometimes be pathogens. Corynebacterium often resistant to most antibiotics, but vancomycin and daptomycin treat this. Now with improvement in wounds, bone finally covered over at R heel. CRP fluctuating, overall improved.  Completed 6 weeks of zosyn, now on just daptomycin for a couple more days.   2. L heel wound to soft tissues  3. S/p R TMA 4/27/23. Cultures MSSA (not MRSA)  4. DM  5. H/o remote penicillin allergy. Has tolerated pip/tazo.   6. H/o MRSA in 2019. Negative May 2023.   7. DVT R LE occurred while on anticoagulation, dose now increased.     PLAN:  Daptomycin started around 6/23. Plan through 8/4/23 then remove PICC.  Due to allergies will avoid oral antibiotics  Keep follow up with Dr. Spears  I can be contacted if infection concerns/questions arise  Return as needed.       Wagner Cr MD  [facility]   Clinic phone: [phone]   Clinic fax: [phone]    ______________________________________________________________________    SUBJECTIVE / INTERVAL HISTORY: Ever Crane returns for follow up of osteomyelitis of right foot. This was a phone visit.     Has surgery 6/1/23, feet debrided, including down to bone at 1st MT. He is at [facility].    I added daptomycin around 6/23 due to CRP and ongoing wound into bone.     Has seen Dr. Spears on 7/27/23. Wounds have improved, photos from that visit reviewed. Bone is covered over at Right heel. The wounds are still large but they are only into subcutaneous tissue and there is no tunneling.     He feels the same. PICC in place. Still some pain at heels after debridements. No fever. Tolerating  daptomyin. Off zosyn.     Voice calm, no respiratory distress.  Vitals:  None taken for phone visit    Phone-Visit Details    Type of service:  Video Visit   Video Start Time:  9:37am  Video End Time: 9:48am    Originating Location (pt. Location): TCU    Distant Location (provider location):  On-site  Platform used for Video Visit: Telephone

## 2023-08-03 ENCOUNTER — TRANSITIONAL CARE UNIT VISIT (OUTPATIENT)
Dept: GERIATRICS | Facility: CLINIC | Age: 78
End: 2023-08-03
Payer: MEDICARE

## 2023-08-03 ENCOUNTER — LAB REQUISITION (OUTPATIENT)
Dept: LAB | Facility: CLINIC | Age: 78
End: 2023-08-03
Payer: MEDICARE

## 2023-08-03 VITALS
TEMPERATURE: 98.1 F | SYSTOLIC BLOOD PRESSURE: 104 MMHG | OXYGEN SATURATION: 97 % | HEART RATE: 78 BPM | BODY MASS INDEX: 26.2 KG/M2 | WEIGHT: 183 LBS | HEIGHT: 70 IN | DIASTOLIC BLOOD PRESSURE: 66 MMHG | RESPIRATION RATE: 20 BRPM

## 2023-08-03 DIAGNOSIS — Z79.4 TYPE 2 DIABETES MELLITUS WITH OTHER CIRCULATORY COMPLICATION, WITH LONG-TERM CURRENT USE OF INSULIN (H): Primary | ICD-10-CM

## 2023-08-03 DIAGNOSIS — I50.30 HEART FAILURE WITH PRESERVED EJECTION FRACTION, NYHA CLASS II (H): ICD-10-CM

## 2023-08-03 DIAGNOSIS — L08.9 LOCAL INFECTION OF THE SKIN AND SUBCUTANEOUS TISSUE, UNSPECIFIED: ICD-10-CM

## 2023-08-03 DIAGNOSIS — I10 ESSENTIAL HYPERTENSION: ICD-10-CM

## 2023-08-03 DIAGNOSIS — I73.9 PAD (PERIPHERAL ARTERY DISEASE) (H): ICD-10-CM

## 2023-08-03 DIAGNOSIS — E11.59 TYPE 2 DIABETES MELLITUS WITH OTHER CIRCULATORY COMPLICATION, WITH LONG-TERM CURRENT USE OF INSULIN (H): Primary | ICD-10-CM

## 2023-08-03 DIAGNOSIS — J44.9 CHRONIC OBSTRUCTIVE PULMONARY DISEASE, UNSPECIFIED COPD TYPE (H): ICD-10-CM

## 2023-08-03 DIAGNOSIS — E11.42 POLYNEUROPATHY DUE TO TYPE 2 DIABETES MELLITUS (H): ICD-10-CM

## 2023-08-03 DIAGNOSIS — Z98.890 S/P FOOT SURGERY, RIGHT: ICD-10-CM

## 2023-08-03 DIAGNOSIS — I48.19 PERSISTENT ATRIAL FIBRILLATION (H): ICD-10-CM

## 2023-08-03 PROCEDURE — 99309 SBSQ NF CARE MODERATE MDM 30: CPT | Performed by: FAMILY MEDICINE

## 2023-08-03 NOTE — LETTER
8/3/2023        RE: Ever Crane  [address]  [address]  [place] [address]        [facility]  Chief Complaint   Patient presents with     intermediate Regulatory     [place] Medical Record Number:  [id number]  Place of Service where encounter took place:  [facility] ([facility]) [[id number]]    HPI:    Ever Crane  is 78 year old (1945), who is being seen today for a federally mandated E/M visit.  HPI information obtained from: facility chart records, facility staff, patient report and [facility] chart review.     UPDATES SINCE OUT LAST VISIT IN APRIL  ------------------------------------------------------------  4/27: S/P Transmetatarsal amputation of right foot, Rt  Achillis tendon lengthening, excisional debridement ulceration rt heel. Cx grew MSSA  5/15: Rt calcaneus I &D, Cx grew enterobacter, enterococcus  6/1/23:  debridement wound 1st metatarsal.   7/7: wound debrided and wound vac started.   8/2: ID visit, continue  dapto through 8/4. zosyn completed on 7/13      Today's concerns are:   - Resident seen and examined, chart reviewed and discussed with the nursing staff.   - Rt foot: denies pain  - T2D:  Appetite is fine  - report dull pain over LUQ lasts for a couple of second, for years. No aggravated factors     - DNP and RN have no concern today.   --------------------------------    MEDICATIONS:  Current Outpatient Medications   Medication Sig Dispense Refill     acetaminophen (TYLENOL) 325 MG tablet 1000 mg BID scheduled and 650 mg bid prn       albuterol (PROAIR HFA/PROVENTIL HFA/VENTOLIN HFA) 108 (90 Base) MCG/ACT inhaler Inhale 2 puffs into the lungs 2 times daily as needed       clopidogrel (PLAVIX) 75 MG tablet Take 1 tablet (75 mg) by mouth daily Start taking medication the day after the procedure. 90 tablet 1     DAPTOmycin (CUBICIN) 500 MG injection Inject 10 mLs (500 mg) into the vein daily for 23 days Last day 8/4/23 230 mL 0     dulaglutide (TRULICITY) 0.75  "MG/0.5ML pen Inject 0.75 mg Subcutaneous every 7 days       furosemide (LASIX) 40 MG tablet Take 40 mg by mouth daily       gabapentin (NEURONTIN) 100 MG capsule Take 200 mg by mouth 2 times daily       ketoconazole (NIZORAL) 2 % external shampoo Apply topically twice a week Mon and Fri.       losartan (COZAAR) 25 MG tablet Take 12.5 mg by mouth daily       mineral oil-hydrophilic petrolatum (AQUAPHOR) external ointment Apply topically 2 times daily       omeprazole (PRILOSEC) 20 MG CR capsule Take 20 mg by mouth daily       piperacillin-tazobactam (ZOSYN) 3-0.375 GM vial Inject 3.375 g into the vein every 8 hours 4 hour infusion (Patient not taking: Reported on 8/2/2023) 1890 mL 0     polyethylene glycol (MIRALAX) 17 g packet Take 1 packet by mouth daily       rivaroxaban ANTICOAGULANT (XARELTO) 15 MG TABS tablet Take 20 mg by mouth daily       senna-docusate (SENOKOT-S/PERICOLACE) 8.6-50 MG tablet Take 1 tablet by mouth 2 times daily And BID PRN       simvastatin (ZOCOR) 40 MG tablet Take 40 mg by mouth At Bedtime       spironolactone (ALDACTONE) 25 MG tablet Take 25 mg by mouth daily       tamsulosin (FLOMAX) 0.4 MG capsule Take 0.4 mg by mouth daily       Case Management:  I have reviewed the care plan and MDS and do agree with the plan. Patient's desire to return to the community is present, but is not able due to care needs . Information reviewed:  Medications, vital signs, orders, and nursing notes.    ROS: 4 point ROS including Respiratory, CV, GI and , other than that noted in the HPI,  is negative    Vitals:  /66   Pulse 78   Temp 98.1  F (36.7  C)   Resp 20   Ht 1.778 m (5' 10\")   Wt 83 kg (183 lb)   SpO2 97%   BMI 26.26 kg/m    Body mass index is 26.26 kg/m .  Exam:  GENERAL APPEARANCE:  in no distress,   RESP:  Unlabored breathing. CTA b/l.   CV:  S1S2 audible, regular HR, no murmur appreciated.   ABDOMEN:  soft, NT/ND, tympanic on percussion over epigastric, and LUQ. BS audible.   M/S:   " "amputated  distal feet  SKIN:  Feet surgically dressed.   NEURO:   No NFD appreciated on observation.   PSYCH:  affect and mood normal    Lab/Diagnostic data:  Reviewed in the chart and EHR.          ASSESSMENT/PLAN  --------------------------  (E11.59,  Z79.4) Type 2 diabetes mellitus with other circulatory complication, with long-term current use of insulin (H)  (primary encounter diagnosis)  (E11.42) Polyneuropathy due to type 2 diabetes mellitus (H)   A1C 6.6 05/24/2023    A1C 6.7 05/15/2023   - controlled. On Trulicity.      (I50.30) Heart failure with preserved ejection fraction, NYHA class II (H)  (I48.19) Persistent atrial fibrillation (H)  (I10) Essential hypertension  - cardiac wise compensated.   - CVR, not on rate controlled.   - on Xarelto  for cva ppx.     (J44.9) Chronic obstructive pulmonary disease, unspecified COPD type (H)  - pulmonary wise compensated    (I73.9) PAD (peripheral artery disease) (H)  (Z98.890) S/P foot surgery, right  4/27: S/P Transmetatarsal amputation of right foot, Rt  Achillis tendon lengthening, excisional debridement ulceration rt heel. Cx grew MSSA  5/15: Rt calcaneus I &D, Cx grew enterobacter, enterococcus  6/1/23:  debridement wound 1st metatarsal.   7/7: wound debrided and wound vac started.   8/2: ID visit, continue  dapto through 8/4. zosyn completed on 7/13    Non specified abd pain:  - query 2/2 to flatulence. Drinks plenty of carbonated water \"pops'        Electronically signed by:  Isabel Stephens MD        Sincerely,        Isabel Stephens MD      "

## 2023-08-04 ENCOUNTER — TELEPHONE (OUTPATIENT)
Dept: GERIATRICS | Facility: CLINIC | Age: 78
End: 2023-08-04

## 2023-08-04 LAB
ALBUMIN SERPL BCG-MCNC: 3.8 G/DL (ref 3.5–5.2)
ALP SERPL-CCNC: 91 U/L (ref 40–129)
ALT SERPL W P-5'-P-CCNC: 11 U/L (ref 0–70)
ANION GAP SERPL CALCULATED.3IONS-SCNC: 14 MMOL/L (ref 7–15)
AST SERPL W P-5'-P-CCNC: 22 U/L (ref 0–45)
BASOPHILS # BLD AUTO: 0.1 10E3/UL (ref 0–0.2)
BASOPHILS NFR BLD AUTO: 1 %
BILIRUB SERPL-MCNC: 0.4 MG/DL
BUN SERPL-MCNC: 26.2 MG/DL (ref 8–23)
CALCIUM SERPL-MCNC: 9.7 MG/DL (ref 8.8–10.2)
CHLORIDE SERPL-SCNC: 104 MMOL/L (ref 98–107)
CK SERPL-CCNC: 163 U/L (ref 39–308)
CREAT SERPL-MCNC: 1.18 MG/DL (ref 0.67–1.17)
CRP SERPL-MCNC: 3.64 MG/L
DEPRECATED HCO3 PLAS-SCNC: 22 MMOL/L (ref 22–29)
EOSINOPHIL # BLD AUTO: 0.5 10E3/UL (ref 0–0.7)
EOSINOPHIL NFR BLD AUTO: 8 %
ERYTHROCYTE [DISTWIDTH] IN BLOOD BY AUTOMATED COUNT: 15.3 % (ref 10–15)
GFR SERPL CREATININE-BSD FRML MDRD: 63 ML/MIN/1.73M2
GLUCOSE SERPL-MCNC: 120 MG/DL (ref 70–99)
HCT VFR BLD AUTO: 34.4 % (ref 40–53)
HGB BLD-MCNC: 10.7 G/DL (ref 13.3–17.7)
IMM GRANULOCYTES # BLD: 0 10E3/UL
IMM GRANULOCYTES NFR BLD: 0 %
LYMPHOCYTES # BLD AUTO: 1.4 10E3/UL (ref 0.8–5.3)
LYMPHOCYTES NFR BLD AUTO: 22 %
MCH RBC QN AUTO: 24.7 PG (ref 26.5–33)
MCHC RBC AUTO-ENTMCNC: 31.1 G/DL (ref 31.5–36.5)
MCV RBC AUTO: 79 FL (ref 78–100)
MONOCYTES # BLD AUTO: 0.4 10E3/UL (ref 0–1.3)
MONOCYTES NFR BLD AUTO: 6 %
NEUTROPHILS # BLD AUTO: 4.2 10E3/UL (ref 1.6–8.3)
NEUTROPHILS NFR BLD AUTO: 63 %
NRBC # BLD AUTO: 0 10E3/UL
NRBC BLD AUTO-RTO: 0 /100
PLATELET # BLD AUTO: 201 10E3/UL (ref 150–450)
POTASSIUM SERPL-SCNC: 4.4 MMOL/L (ref 3.4–5.3)
PROT SERPL-MCNC: 7.3 G/DL (ref 6.4–8.3)
RBC # BLD AUTO: 4.34 10E6/UL (ref 4.4–5.9)
SODIUM SERPL-SCNC: 140 MMOL/L (ref 136–145)
WBC # BLD AUTO: 6.6 10E3/UL (ref 4–11)

## 2023-08-04 PROCEDURE — 36415 COLL VENOUS BLD VENIPUNCTURE: CPT | Mod: ORL | Performed by: FAMILY MEDICINE

## 2023-08-04 PROCEDURE — 82550 ASSAY OF CK (CPK): CPT | Mod: ORL | Performed by: FAMILY MEDICINE

## 2023-08-04 PROCEDURE — 85025 COMPLETE CBC W/AUTO DIFF WBC: CPT | Mod: ORL | Performed by: FAMILY MEDICINE

## 2023-08-04 PROCEDURE — 86140 C-REACTIVE PROTEIN: CPT | Mod: ORL | Performed by: FAMILY MEDICINE

## 2023-08-04 PROCEDURE — 80053 COMPREHEN METABOLIC PANEL: CPT | Mod: ORL | Performed by: FAMILY MEDICINE

## 2023-08-04 PROCEDURE — P9604 ONE-WAY ALLOW PRORATED TRIP: HCPCS | Mod: ORL | Performed by: FAMILY MEDICINE

## 2023-08-04 NOTE — TELEPHONE ENCOUNTER
[facility] Lab Note     Provider: KEVIN Lundberg CNP, DNP  Facility: [facility] Facility Type:  TCU    Allergies   Allergen Reactions     Cranberry Extract Itching and Rash     Doxycycline Rash     Latex Rash     Penicillin V Rash     Reaction occurred as a child. Patient tolerated Zosyn 6/2018, Cefazolin 10/2018, and has also tolerated Augmentin.     Penicillins Rash     Reaction occurred as a child. Patient tolerated Zosyn 6/2018, Cefazolin 10/2018, and has also tolerated Augmentin.       Labs Reviewed by provider: Heme 1, CMP, CRP, CK     Verbal Order/Direction given by Provider: Fax results to ID and pharmacy, other no new orders.      Provider giving Order:  KEVIN Fernandes CNP    Verbal Order given to: Trang Jarquin RN

## 2023-08-05 RX ORDER — IBUPROFEN 200 MG
200 TABLET ORAL 2 TIMES DAILY
COMMUNITY
End: 2023-08-14

## 2023-08-05 RX ORDER — ALBUTEROL SULFATE 0.83 MG/ML
2.5 SOLUTION RESPIRATORY (INHALATION) EVERY 6 HOURS PRN
COMMUNITY
End: 2023-09-22

## 2023-08-10 ENCOUNTER — LAB REQUISITION (OUTPATIENT)
Dept: LAB | Facility: CLINIC | Age: 78
End: 2023-08-10
Payer: MEDICARE

## 2023-08-10 DIAGNOSIS — L08.9 LOCAL INFECTION OF THE SKIN AND SUBCUTANEOUS TISSUE, UNSPECIFIED: ICD-10-CM

## 2023-08-11 ENCOUNTER — TRANSFERRED RECORDS (OUTPATIENT)
Dept: HEALTH INFORMATION MANAGEMENT | Facility: CLINIC | Age: 78
End: 2023-08-11

## 2023-08-11 LAB
ALBUMIN SERPL BCG-MCNC: 4.1 G/DL (ref 3.5–5.2)
ALP SERPL-CCNC: 111 U/L (ref 40–129)
ALT SERPL W P-5'-P-CCNC: 7 U/L (ref 0–70)
ANION GAP SERPL CALCULATED.3IONS-SCNC: 12 MMOL/L (ref 7–15)
AST SERPL W P-5'-P-CCNC: 14 U/L (ref 0–45)
BASOPHILS # BLD AUTO: 0.1 10E3/UL (ref 0–0.2)
BASOPHILS NFR BLD AUTO: 1 %
BILIRUB SERPL-MCNC: 0.6 MG/DL
BUN SERPL-MCNC: 25 MG/DL (ref 8–23)
CALCIUM SERPL-MCNC: 9.9 MG/DL (ref 8.8–10.2)
CHLORIDE SERPL-SCNC: 102 MMOL/L (ref 98–107)
CK SERPL-CCNC: 37 U/L (ref 39–308)
CREAT SERPL-MCNC: 1.1 MG/DL (ref 0.67–1.17)
CRP SERPL-MCNC: 10.13 MG/L
DEPRECATED HCO3 PLAS-SCNC: 24 MMOL/L (ref 22–29)
EOSINOPHIL # BLD AUTO: 0.4 10E3/UL (ref 0–0.7)
EOSINOPHIL NFR BLD AUTO: 5 %
ERYTHROCYTE [DISTWIDTH] IN BLOOD BY AUTOMATED COUNT: 15.7 % (ref 10–15)
GFR SERPL CREATININE-BSD FRML MDRD: 69 ML/MIN/1.73M2
GLUCOSE SERPL-MCNC: 151 MG/DL (ref 70–99)
HCT VFR BLD AUTO: 37.2 % (ref 40–53)
HGB BLD-MCNC: 11.5 G/DL (ref 13.3–17.7)
IMM GRANULOCYTES # BLD: 0 10E3/UL
IMM GRANULOCYTES NFR BLD: 0 %
LYMPHOCYTES # BLD AUTO: 1.3 10E3/UL (ref 0.8–5.3)
LYMPHOCYTES NFR BLD AUTO: 17 %
MCH RBC QN AUTO: 24.6 PG (ref 26.5–33)
MCHC RBC AUTO-ENTMCNC: 30.9 G/DL (ref 31.5–36.5)
MCV RBC AUTO: 80 FL (ref 78–100)
MONOCYTES # BLD AUTO: 0.5 10E3/UL (ref 0–1.3)
MONOCYTES NFR BLD AUTO: 6 %
NEUTROPHILS # BLD AUTO: 5.4 10E3/UL (ref 1.6–8.3)
NEUTROPHILS NFR BLD AUTO: 71 %
NRBC # BLD AUTO: 0 10E3/UL
NRBC BLD AUTO-RTO: 0 /100
PLATELET # BLD AUTO: 208 10E3/UL (ref 150–450)
POTASSIUM SERPL-SCNC: 4.7 MMOL/L (ref 3.4–5.3)
PROT SERPL-MCNC: 7.8 G/DL (ref 6.4–8.3)
RBC # BLD AUTO: 4.68 10E6/UL (ref 4.4–5.9)
SODIUM SERPL-SCNC: 138 MMOL/L (ref 136–145)
WBC # BLD AUTO: 7.7 10E3/UL (ref 4–11)

## 2023-08-11 PROCEDURE — 82550 ASSAY OF CK (CPK): CPT | Mod: ORL | Performed by: FAMILY MEDICINE

## 2023-08-11 PROCEDURE — P9603 ONE-WAY ALLOW PRORATED MILES: HCPCS | Mod: ORL | Performed by: FAMILY MEDICINE

## 2023-08-11 PROCEDURE — 80053 COMPREHEN METABOLIC PANEL: CPT | Mod: ORL | Performed by: FAMILY MEDICINE

## 2023-08-11 PROCEDURE — 36415 COLL VENOUS BLD VENIPUNCTURE: CPT | Mod: ORL | Performed by: FAMILY MEDICINE

## 2023-08-11 PROCEDURE — 85025 COMPLETE CBC W/AUTO DIFF WBC: CPT | Mod: ORL | Performed by: FAMILY MEDICINE

## 2023-08-11 PROCEDURE — 86140 C-REACTIVE PROTEIN: CPT | Mod: ORL | Performed by: FAMILY MEDICINE

## 2023-08-14 ENCOUNTER — TRANSITIONAL CARE UNIT VISIT (OUTPATIENT)
Dept: GERIATRICS | Facility: CLINIC | Age: 78
End: 2023-08-14
Payer: MEDICARE

## 2023-08-14 VITALS
RESPIRATION RATE: 18 BRPM | HEART RATE: 64 BPM | TEMPERATURE: 97.7 F | OXYGEN SATURATION: 96 % | DIASTOLIC BLOOD PRESSURE: 65 MMHG | WEIGHT: 181.4 LBS | BODY MASS INDEX: 25.97 KG/M2 | HEIGHT: 70 IN | SYSTOLIC BLOOD PRESSURE: 125 MMHG

## 2023-08-14 DIAGNOSIS — I10 ESSENTIAL HYPERTENSION: Chronic | ICD-10-CM

## 2023-08-14 DIAGNOSIS — I50.30 HEART FAILURE WITH PRESERVED EJECTION FRACTION, NYHA CLASS II (H): Chronic | ICD-10-CM

## 2023-08-14 DIAGNOSIS — E11.42 POLYNEUROPATHY DUE TO TYPE 2 DIABETES MELLITUS (H): Primary | Chronic | ICD-10-CM

## 2023-08-14 DIAGNOSIS — N18.32 STAGE 3B CHRONIC KIDNEY DISEASE (H): Chronic | ICD-10-CM

## 2023-08-14 PROBLEM — S72.145D CLOSED NONDISPLACED INTERTROCHANTERIC FRACTURE OF LEFT FEMUR WITH ROUTINE HEALING, SUBSEQUENT ENCOUNTER: Status: RESOLVED | Noted: 2022-05-18 | Resolved: 2023-08-14

## 2023-08-14 PROBLEM — Z89.439: Status: RESOLVED | Noted: 2022-04-11 | Resolved: 2023-08-14

## 2023-08-14 PROBLEM — K81.9 CHOLECYSTITIS: Status: RESOLVED | Noted: 2019-10-14 | Resolved: 2023-08-14

## 2023-08-14 PROBLEM — R50.9 FEVER: Status: RESOLVED | Noted: 2018-06-28 | Resolved: 2023-08-14

## 2023-08-14 PROBLEM — I50.22 CHRONIC SYSTOLIC HEART FAILURE (H): Status: RESOLVED | Noted: 2023-01-17 | Resolved: 2023-08-14

## 2023-08-14 PROBLEM — E11.59 TYPE 2 DIABETES MELLITUS WITH OTHER CIRCULATORY COMPLICATION, WITH LONG-TERM CURRENT USE OF INSULIN (H): Chronic | Status: ACTIVE | Noted: 2022-05-25

## 2023-08-14 PROBLEM — Z79.84 LONG TERM (CURRENT) USE OF ORAL HYPOGLYCEMIC DRUGS: Status: RESOLVED | Noted: 2021-05-03 | Resolved: 2023-08-14

## 2023-08-14 PROBLEM — I25.728 CORONARY ARTERY DISEASE OF AUTOLOGOUS BYPASS GRAFT WITH STABLE ANGINA PECTORIS (H): Chronic | Status: ACTIVE | Noted: 2022-03-14

## 2023-08-14 PROBLEM — Z20.822 CONTACT WITH AND (SUSPECTED) EXPOSURE TO COVID-19: Status: RESOLVED | Noted: 2021-12-13 | Resolved: 2023-08-14

## 2023-08-14 PROBLEM — S81.802A UNSPECIFIED OPEN WOUND, LEFT LOWER LEG, INITIAL ENCOUNTER: Status: RESOLVED | Noted: 2019-10-14 | Resolved: 2023-08-14

## 2023-08-14 PROBLEM — I50.31 ACUTE DIASTOLIC HEART FAILURE (H): Status: RESOLVED | Noted: 2022-06-07 | Resolved: 2023-08-14

## 2023-08-14 PROBLEM — L97.514 DIABETIC ULCER OF TOE OF RIGHT FOOT ASSOCIATED WITH DIABETES MELLITUS DUE TO UNDERLYING CONDITION, WITH NECROSIS OF BONE (H): Status: RESOLVED | Noted: 2023-01-05 | Resolved: 2023-08-14

## 2023-08-14 PROBLEM — E11.69 DM TYPE 2 WITH DIABETIC MIXED HYPERLIPIDEMIA (H): Chronic | Status: ACTIVE | Noted: 2022-12-06

## 2023-08-14 PROBLEM — J18.9 PNEUMONIA: Status: RESOLVED | Noted: 2018-06-26 | Resolved: 2023-08-14

## 2023-08-14 PROBLEM — I89.0 LYMPHEDEMA, NOT ELSEWHERE CLASSIFIED: Status: RESOLVED | Noted: 2022-05-17 | Resolved: 2023-08-14

## 2023-08-14 PROBLEM — J44.9 CHRONIC OBSTRUCTIVE PULMONARY DISEASE, UNSPECIFIED (H): Chronic | Status: ACTIVE | Noted: 2022-05-17

## 2023-08-14 PROBLEM — I48.19 PERSISTENT ATRIAL FIBRILLATION (H): Chronic | Status: ACTIVE | Noted: 2022-05-12

## 2023-08-14 PROBLEM — G63 POLYNEUROPATHY ASSOCIATED WITH UNDERLYING DISEASE (H): Chronic | Status: ACTIVE | Noted: 2017-08-10

## 2023-08-14 PROBLEM — E08.621 DIABETIC ULCER OF TOE OF RIGHT FOOT ASSOCIATED WITH DIABETES MELLITUS DUE TO UNDERLYING CONDITION, LIMITED TO BREAKDOWN OF SKIN (H): Status: RESOLVED | Noted: 2023-01-05 | Resolved: 2023-08-14

## 2023-08-14 PROBLEM — I48.3 TYPICAL ATRIAL FLUTTER (H): Chronic | Status: ACTIVE | Noted: 2022-05-17

## 2023-08-14 PROBLEM — E78.2 DM TYPE 2 WITH DIABETIC MIXED HYPERLIPIDEMIA (H): Chronic | Status: ACTIVE | Noted: 2022-12-06

## 2023-08-14 PROBLEM — S81.801A OPEN WOUND OF BOTH LEGS WITH COMPLICATION: Status: RESOLVED | Noted: 2022-12-28 | Resolved: 2023-08-14

## 2023-08-14 PROBLEM — Z86.79 PERSONAL HISTORY OF OTHER DISEASES OF THE CIRCULATORY SYSTEM: Status: RESOLVED | Noted: 2022-05-17 | Resolved: 2023-08-14

## 2023-08-14 PROBLEM — A04.72 CLOSTRIDIUM DIFFICILE COLITIS: Status: RESOLVED | Noted: 2019-10-14 | Resolved: 2023-08-14

## 2023-08-14 PROBLEM — E08.621 DIABETIC ULCER OF TOE OF RIGHT FOOT ASSOCIATED WITH DIABETES MELLITUS DUE TO UNDERLYING CONDITION, WITH NECROSIS OF BONE (H): Status: RESOLVED | Noted: 2023-01-05 | Resolved: 2023-08-14

## 2023-08-14 PROBLEM — M86.9 OSTEOMYELITIS OF ANKLE AND FOOT (H): Chronic | Status: ACTIVE | Noted: 2023-01-13

## 2023-08-14 PROBLEM — Z79.4 TYPE 2 DIABETES MELLITUS WITH OTHER CIRCULATORY COMPLICATION, WITH LONG-TERM CURRENT USE OF INSULIN (H): Chronic | Status: ACTIVE | Noted: 2022-05-25

## 2023-08-14 PROBLEM — I73.9 PAD (PERIPHERAL ARTERY DISEASE) (H): Chronic | Status: ACTIVE | Noted: 2023-01-05

## 2023-08-14 PROBLEM — R19.8 RECTAL DISCHARGE: Status: RESOLVED | Noted: 2022-10-10 | Resolved: 2023-08-14

## 2023-08-14 PROBLEM — L97.511 DIABETIC ULCER OF TOE OF RIGHT FOOT ASSOCIATED WITH DIABETES MELLITUS DUE TO UNDERLYING CONDITION, LIMITED TO BREAKDOWN OF SKIN (H): Status: RESOLVED | Noted: 2023-01-05 | Resolved: 2023-08-14

## 2023-08-14 PROBLEM — I87.2 VENOUS INSUFFICIENCY (CHRONIC) (PERIPHERAL): Status: RESOLVED | Noted: 2022-05-17 | Resolved: 2023-08-14

## 2023-08-14 PROBLEM — G63 NEUROPATHY DUE TO MEDICAL CONDITION (H): Status: RESOLVED | Noted: 2022-05-09 | Resolved: 2023-08-14

## 2023-08-14 PROBLEM — Z89.439 STATUS POST AMPUTATION OF FOOT (H): Chronic | Status: ACTIVE | Noted: 2022-06-07

## 2023-08-14 PROBLEM — L65.9 HAIR LOSS: Status: RESOLVED | Noted: 2021-10-18 | Resolved: 2023-08-14

## 2023-08-14 PROBLEM — R10.32 LEFT GROIN PAIN: Status: RESOLVED | Noted: 2022-02-28 | Resolved: 2023-08-14

## 2023-08-14 PROBLEM — S81.819A SKIN TEAR OF LOWER LEG WITHOUT COMPLICATION: Status: RESOLVED | Noted: 2022-05-09 | Resolved: 2023-08-14

## 2023-08-14 PROBLEM — I73.9 PERIPHERAL VASCULAR DISEASE (H): Chronic | Status: ACTIVE | Noted: 2017-11-14

## 2023-08-14 PROBLEM — S81.802A OPEN WOUND OF BOTH LEGS WITH COMPLICATION: Status: RESOLVED | Noted: 2022-12-28 | Resolved: 2023-08-14

## 2023-08-14 PROBLEM — D62 ACUTE POSTHEMORRHAGIC ANEMIA: Status: RESOLVED | Noted: 2022-05-17 | Resolved: 2023-08-14

## 2023-08-14 PROBLEM — E87.5 HYPERKALEMIA: Status: RESOLVED | Noted: 2022-06-07 | Resolved: 2023-08-14

## 2023-08-14 PROCEDURE — 99309 SBSQ NF CARE MODERATE MDM 30: CPT | Performed by: NURSE PRACTITIONER

## 2023-08-14 RX ORDER — CLOPIDOGREL BISULFATE 75 MG/1
75 TABLET ORAL DAILY
COMMUNITY

## 2023-08-14 NOTE — PROGRESS NOTES
[facility]    Chief Complaint   Patient presents with    RECHECK     HPI:  Ever Crane is a 78 year old  (1945), who is being seen today for an episodic care visit at: [facility] ([facility]) [[id number]].     Background:     This is a 78-year-old male, with a past medical history significant for type 2 diabetes mellitus, peripheral vascular disease, diabetic ulceration of the 2nd through 5th digits of the right foot, ulceration of bilateral heels, ulceration of left leg, and osteomyelitis of the right foot including the 4th PIP and DIP as well as 5th DIP, who was admitted to [facility] 1/17/23. Since that time, has had the following procedures -     4/27/23 - Transmetatarsal amputation of the right foot with right achilles tendon lengthening and excisional debridement of right heel ulceration   5/15/23 - Incision and drainage of right heel with wound vac placement  5/23/23 - wound vac came off  6/1/23 - Incision and drainage of right foot, exostectomy of the first metatarsal of the right foot, and  debridement of ulceration of bilateral heels with wound vac placement  End of June - Wound vac held due to concerns related to RLE swelling  7/7/23 - Wound vac re-applied  7/27/23 - Wound vac discontinued       Today's concern is:   Ever reports he has no new concerns, he reports he continues to have loose stools at times, even though bowel medications have been discontinued.  He reports he is having no pain, no chest pain, no cough, no congestion.  He reports he is sleeping well and without other concerns.     BP Readings from Last 3 Encounters:   08/14/23 125/65   08/03/23 104/66   08/01/23 135/73        Wt Readings from Last 4 Encounters:   08/14/23 82.3 kg (181 lb 6.4 oz)   08/03/23 83 kg (183 lb)   08/01/23 83 kg (183 lb)   07/20/23 83.9 kg (184 lb 14.4 oz)         Allergies, and PMH/PSH reviewed in EPIC today.  REVIEW OF SYSTEMS:  4 point ROS including Respiratory, CV, GI and ,  "other than that noted in the HPI,  is negative    Objective:   /65   Pulse 64   Temp 97.7  F (36.5  C)   Resp 18   Ht 1.778 m (5' 10\")   Wt 82.3 kg (181 lb 6.4 oz)   SpO2 96%   BMI 26.03 kg/m    GENERAL APPEARANCE:  Alert, in no distress   ENT:  Mouth and posterior oropharynx normal, moist mucous membranes, hearing acuity - within normal limits   CHEST/RESP:  respiratory effort normal, no respiratory distress, lung sounds CTA    CV:  Rate and rhythm reg, no murmur, no peripheral edema  M/S:   extremities abnormal, bilateral feet with dressings intact  SKIN:  bilateral LE with denisse colored skin, incisions/wounds not visualized  NEUROLOGIC EXAM: no focal deficit,   PSYCH:  Alert and oriented to self and surroundings, affect pleasant      Most Recent 3 CBC's:  Recent Labs   Lab Test 08/11/23  0823 08/04/23  0759 08/02/23  1000   WBC 7.7 6.6 8.0   HGB 11.5* 10.7* 11.5*   MCV 80 79 81    201 227     Most Recent 3 BMP's:  Recent Labs   Lab Test 08/11/23  0823 08/04/23  0759 08/02/23  1000    140 141   POTASSIUM 4.7 4.4 4.1   CHLORIDE 102 104 102   CO2 24 22 18*   BUN 25.0* 26.2* 23.6*   CR 1.10 1.18* 1.22*   ANIONGAP 12 14 21*   MELODY 9.9 9.7 9.5   * 120* 210*     Most Recent ESR & CRP:  Recent Labs   Lab Test 08/11/23  0823 05/24/23  1250 01/17/23  1242 01/03/19  1457   SED  --   --  48*  --    CRP  --   --   --  4.0*   CRPI 10.13*   < > 92.70*  --     < > = values in this interval not displayed.       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  He is on gabapentin 200 bid and has no pain.  He is also on extra strength tylenol BID and ibuprofen BID.  Noting impaired kidney function.  He has no pain.  Wound care continues to be managed by Wound Care NP at facility   -discontinue ibuprofen   -consider decrease to prn of tylenol if pain control remains adequate    Essential " hypertension  On losartan, lasix, spironolactone, BP trending within normal limits - as noted above on current regimen. Based on patient's age of 78 year old, presence of DM and CKD and goals of care, BP is within normal limits . stable  -continue current plan      Stage 3b chronic kidney disease (H)  Baseline creat about 1.1.  Most recent creatinine 1.1, BUN 25, eGFR 69. Stable   -Avoid nephrotoxic medications  -Renal dosing of medications  -monitor kidney function routinely      Heart failure with preserved ejection fraction, NYHA class II (H)  Known history of CHF, with most recent echo showing EF of 60%.  On losartan, lasix, statin, spironolactone.  No recent changes in medications. Compensated, no dyspnea, no edema.  -continue current plan        MED REC REQUIRED  Post Medication Reconciliation Status: patient was not discharged from an inpatient facility or TCU        Orders:  Discontinue ibuprofen  Follow up within 2-4 weeks or prn     Electronically signed by: KEVIN Mora CNP

## 2023-08-14 NOTE — LETTER
8/14/2023        RE: Ever Crane  [address]  [address]  [place] [address]        [facility]    Chief Complaint   Patient presents with     RECHECK     HPI:  Ever Crane is a 78 year old  (1945), who is being seen today for an episodic care visit at: [facility] ([facility]) [[id number]].     Background:     This is a 78-year-old male, with a past medical history significant for type 2 diabetes mellitus, peripheral vascular disease, diabetic ulceration of the 2nd through 5th digits of the right foot, ulceration of bilateral heels, ulceration of left leg, and osteomyelitis of the right foot including the 4th PIP and DIP as well as 5th DIP, who was admitted to [facility] 1/17/23. Since that time, has had the following procedures -     4/27/23 - Transmetatarsal amputation of the right foot with right achilles tendon lengthening and excisional debridement of right heel ulceration   5/15/23 - Incision and drainage of right heel with wound vac placement  5/23/23 - wound vac came off  6/1/23 - Incision and drainage of right foot, exostectomy of the first metatarsal of the right foot, and  debridement of ulceration of bilateral heels with wound vac placement  End of June - Wound vac held due to concerns related to RLE swelling  7/7/23 - Wound vac re-applied  7/27/23 - Wound vac discontinued       Today's concern is:   Ever reports he has no new concerns, he reports he continues to have loose stools at times, even though bowel medications have been discontinued.  He reports he is having no pain, no chest pain, no cough, no congestion.  He reports he is sleeping well and without other concerns.     BP Readings from Last 3 Encounters:   08/14/23 125/65   08/03/23 104/66   08/01/23 135/73        Wt Readings from Last 4 Encounters:   08/14/23 82.3 kg (181 lb 6.4 oz)   08/03/23 83 kg (183 lb)   08/01/23 83 kg (183 lb)   07/20/23 83.9 kg (184 lb 14.4 oz)         Allergies, and  "PMH/PSH reviewed in EPIC today.  REVIEW OF SYSTEMS:  4 point ROS including Respiratory, CV, GI and , other than that noted in the HPI,  is negative    Objective:   /65   Pulse 64   Temp 97.7  F (36.5  C)   Resp 18   Ht 1.778 m (5' 10\")   Wt 82.3 kg (181 lb 6.4 oz)   SpO2 96%   BMI 26.03 kg/m    GENERAL APPEARANCE:  Alert, in no distress   ENT:  Mouth and posterior oropharynx normal, moist mucous membranes, hearing acuity - within normal limits   CHEST/RESP:  respiratory effort normal, no respiratory distress, lung sounds CTA    CV:  Rate and rhythm reg, no murmur, no peripheral edema  M/S:   extremities abnormal, bilateral feet with dressings intact  SKIN:  bilateral LE with denisse colored skin, incisions/wounds not visualized  NEUROLOGIC EXAM: no focal deficit,   PSYCH:  Alert and oriented to self and surroundings, affect pleasant      Most Recent 3 CBC's:  Recent Labs   Lab Test 08/11/23  0823 08/04/23  0759 08/02/23  1000   WBC 7.7 6.6 8.0   HGB 11.5* 10.7* 11.5*   MCV 80 79 81    201 227     Most Recent 3 BMP's:  Recent Labs   Lab Test 08/11/23  0823 08/04/23  0759 08/02/23  1000    140 141   POTASSIUM 4.7 4.4 4.1   CHLORIDE 102 104 102   CO2 24 22 18*   BUN 25.0* 26.2* 23.6*   CR 1.10 1.18* 1.22*   ANIONGAP 12 14 21*   MELODY 9.9 9.7 9.5   * 120* 210*     Most Recent ESR & CRP:  Recent Labs   Lab Test 08/11/23  0823 05/24/23  1250 01/17/23  1242 01/03/19  1457   SED  --   --  48*  --    CRP  --   --   --  4.0*   CRPI 10.13*   < > 92.70*  --     < > = values in this interval not displayed.       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  He is on gabapentin 200 bid and has no pain.  He is also on extra strength tylenol BID and ibuprofen BID.  Noting impaired kidney function.  He has no pain.  Wound care continues to be managed by Wound Care NP at facility   -discontinue " ibuprofen   -consider decrease to prn of tylenol if pain control remains adequate    Essential hypertension  On losartan, lasix, spironolactone, BP trending within normal limits - as noted above on current regimen. Based on patient's age of 78 year old, presence of DM and CKD and goals of care, BP is within normal limits . stable  -continue current plan      Stage 3b chronic kidney disease (H)  Baseline creat about 1.1.  Most recent creatinine 1.1, BUN 25, eGFR 69. Stable   -Avoid nephrotoxic medications  -Renal dosing of medications  -monitor kidney function routinely      Heart failure with preserved ejection fraction, NYHA class II (H)  Known history of CHF, with most recent echo showing EF of 60%.  On losartan, lasix, statin, spironolactone.  No recent changes in medications. Compensated, no dyspnea, no edema.  -continue current plan        MED REC REQUIRED  Post Medication Reconciliation Status: patient was not discharged from an inpatient facility or TCU        Orders:  Discontinue ibuprofen  Follow up within 2-4 weeks or prn     Electronically signed by: KEVIN Mora CNP     Sincerely,        KEVIN Mora CNP

## 2023-08-17 ENCOUNTER — LAB REQUISITION (OUTPATIENT)
Dept: LAB | Facility: CLINIC | Age: 78
End: 2023-08-17
Payer: MEDICARE

## 2023-08-17 DIAGNOSIS — L08.9 LOCAL INFECTION OF THE SKIN AND SUBCUTANEOUS TISSUE, UNSPECIFIED: ICD-10-CM

## 2023-08-18 LAB
ALBUMIN SERPL BCG-MCNC: 4 G/DL (ref 3.5–5.2)
ALP SERPL-CCNC: 108 U/L (ref 40–129)
ALT SERPL W P-5'-P-CCNC: 9 U/L (ref 0–70)
ANION GAP SERPL CALCULATED.3IONS-SCNC: 12 MMOL/L (ref 7–15)
AST SERPL W P-5'-P-CCNC: 13 U/L (ref 0–45)
BASOPHILS # BLD AUTO: 0.1 10E3/UL (ref 0–0.2)
BASOPHILS NFR BLD AUTO: 1 %
BILIRUB SERPL-MCNC: 0.4 MG/DL
BUN SERPL-MCNC: 21.4 MG/DL (ref 8–23)
CALCIUM SERPL-MCNC: 9.4 MG/DL (ref 8.8–10.2)
CHLORIDE SERPL-SCNC: 104 MMOL/L (ref 98–107)
CK SERPL-CCNC: 31 U/L (ref 39–308)
CREAT SERPL-MCNC: 1.07 MG/DL (ref 0.67–1.17)
CRP SERPL-MCNC: 4.47 MG/L
DEPRECATED HCO3 PLAS-SCNC: 24 MMOL/L (ref 22–29)
EOSINOPHIL # BLD AUTO: 0.5 10E3/UL (ref 0–0.7)
EOSINOPHIL NFR BLD AUTO: 6 %
ERYTHROCYTE [DISTWIDTH] IN BLOOD BY AUTOMATED COUNT: 15.9 % (ref 10–15)
GFR SERPL CREATININE-BSD FRML MDRD: 71 ML/MIN/1.73M2
GLUCOSE SERPL-MCNC: 165 MG/DL (ref 70–99)
HCT VFR BLD AUTO: 37 % (ref 40–53)
HGB BLD-MCNC: 11.4 G/DL (ref 13.3–17.7)
IMM GRANULOCYTES # BLD: 0 10E3/UL
IMM GRANULOCYTES NFR BLD: 0 %
LYMPHOCYTES # BLD AUTO: 1.3 10E3/UL (ref 0.8–5.3)
LYMPHOCYTES NFR BLD AUTO: 16 %
MCH RBC QN AUTO: 24.8 PG (ref 26.5–33)
MCHC RBC AUTO-ENTMCNC: 30.8 G/DL (ref 31.5–36.5)
MCV RBC AUTO: 81 FL (ref 78–100)
MONOCYTES # BLD AUTO: 0.4 10E3/UL (ref 0–1.3)
MONOCYTES NFR BLD AUTO: 5 %
NEUTROPHILS # BLD AUTO: 6 10E3/UL (ref 1.6–8.3)
NEUTROPHILS NFR BLD AUTO: 72 %
NRBC # BLD AUTO: 0 10E3/UL
NRBC BLD AUTO-RTO: 0 /100
PLATELET # BLD AUTO: 171 10E3/UL (ref 150–450)
POTASSIUM SERPL-SCNC: 4.4 MMOL/L (ref 3.4–5.3)
PROT SERPL-MCNC: 7.4 G/DL (ref 6.4–8.3)
RBC # BLD AUTO: 4.59 10E6/UL (ref 4.4–5.9)
SODIUM SERPL-SCNC: 140 MMOL/L (ref 136–145)
WBC # BLD AUTO: 8.3 10E3/UL (ref 4–11)

## 2023-08-18 PROCEDURE — 85025 COMPLETE CBC W/AUTO DIFF WBC: CPT | Mod: ORL | Performed by: FAMILY MEDICINE

## 2023-08-18 PROCEDURE — 80053 COMPREHEN METABOLIC PANEL: CPT | Mod: ORL | Performed by: FAMILY MEDICINE

## 2023-08-18 PROCEDURE — 86140 C-REACTIVE PROTEIN: CPT | Mod: ORL | Performed by: FAMILY MEDICINE

## 2023-08-18 PROCEDURE — 82550 ASSAY OF CK (CPK): CPT | Mod: ORL | Performed by: FAMILY MEDICINE

## 2023-08-18 PROCEDURE — 36415 COLL VENOUS BLD VENIPUNCTURE: CPT | Mod: ORL | Performed by: FAMILY MEDICINE

## 2023-08-18 PROCEDURE — P9604 ONE-WAY ALLOW PRORATED TRIP: HCPCS | Mod: ORL | Performed by: FAMILY MEDICINE

## 2023-08-24 ENCOUNTER — OFFICE VISIT (OUTPATIENT)
Dept: VASCULAR SURGERY | Facility: CLINIC | Age: 78
End: 2023-08-24
Attending: PODIATRIST
Payer: MEDICARE

## 2023-08-24 VITALS
WEIGHT: 183 LBS | DIASTOLIC BLOOD PRESSURE: 64 MMHG | BODY MASS INDEX: 26.2 KG/M2 | OXYGEN SATURATION: 94 % | HEIGHT: 70 IN | RESPIRATION RATE: 16 BRPM | SYSTOLIC BLOOD PRESSURE: 124 MMHG | HEART RATE: 65 BPM

## 2023-08-24 DIAGNOSIS — L97.514 ULCER OF RIGHT FOOT WITH NECROSIS OF BONE (H): Primary | ICD-10-CM

## 2023-08-24 DIAGNOSIS — L89.623 PRESSURE ULCER OF LEFT HEEL, STAGE 3 (H): ICD-10-CM

## 2023-08-24 DIAGNOSIS — L89.613 PRESSURE ULCER OF RIGHT HEEL, STAGE 3 (H): ICD-10-CM

## 2023-08-24 PROCEDURE — 11042 DBRDMT SUBQ TIS 1ST 20SQCM/<: CPT | Performed by: PODIATRIST

## 2023-08-24 PROCEDURE — 11045 DBRDMT SUBQ TISS EACH ADDL: CPT | Performed by: PODIATRIST

## 2023-08-24 ASSESSMENT — PAIN SCALES - GENERAL: PAINLEVEL: MILD PAIN (3)

## 2023-08-24 NOTE — PATIENT INSTRUCTIONS
Important lnstructions        WEIGHT BEARING STATUS: You are to remain NON WEIGHT BEARING on your right foot. NON WEIGHT BEARING MEANS NO PRESSURE ON YOUR FOOT OR HEEL AT ANY TIME FOR ANY REASON!    2. OFFLOADING DEVICE: Must use a A WHEELCHAIR at all times! (do not use affected foot to push wheelchair)    3. STABILIZATION DEVICE: Use a  Prevalon   BILATERALLY. You will need to WEAR THIS AT ALL TIMES EVEN WHILE IN BED.     4. ELEVATE: Elevating your leg means laying with your head on a pillow and your foot ABOVE YOUR HEART.     5. DO NOT MOVE YOUR FOOT.  There is a risk of worsening the wound or incision. To give yourself a higher chance of healing, please DO NOT swing foot back and forth and wiggle foot/toes especially when inside a stabilization device. Limited movement is allowable with therapy as recommended by the doctor.     6. TAKE A PROTEIN SHAKE TWICE A DAY.  (For ex: Boost, Ensure, Glucerna)      Dressing Change lnstructions:    BILATERAL HEEL AND RIGHT DISTAL FOOT WOUNDS:    - Dressing Application of  Endoform for bilateral heel & right distal foot wounds:    1. Endoform should be cut to the size of the wound.  It should touch the edges of the ulcer. It is okay if it overlap the edges a small amount.  Then, moisten the Endoform with saline.(If it is easier for you, you can moisten it before laying it in the wound)    2. Cover the wound with Endoform, followed by dry gauze, and secure with roll gauze if needed.     3. Endoform is naturally used by the body over time so you may not find it in the wound when you change your dressing.  If you do find some, gently cleanse the wound with saline. Do not remove the remaining Endoform, which may appear as an off-white to max gel, just add Endoform on top.  Usually, more Endoform will need to be added every 5-7 days.     4. Change your top dressing every 1-2 days or as needed depending on drainage.    -Endoform is a collagen dressing created from ovine (sheep)  fore-stomach tissue. The collagen extracellular matrix transforms into a soft conforming sheet, which is naturally incorporated into the wound over time.  Collagen dressings are used to stimulate wound healing.   ,      It IS NOT ok to get your wound wet in the bath or shower    SEEK MEDICAL CARE IF:  You have an increase in swelling, pain, or redness around the wound.  You have an increase in the amount of pus coming from the wound.  There is a bad smell coming from the wound.  The wound appears to be worsening/enlarging  You have a fever greater than 101.5 F      It is ok to continue current wound care treatment/products for the next 2-3 days until new wound care supplies are ordered and arrive. If longer than this please contact our office at [phone].        We want to hear from you!   In the next few weeks, you should receive a call or email to complete a survey about your visit at [facility]. Please help us improve your appointment experience by letting us know how we did today. We strive to make your experience good and value any ways in which we could do better.      We value your input and suggestions.    Thank you for choosing the [facility]!

## 2023-08-24 NOTE — PROGRESS NOTES
FOOT AND ANKLE SURGERY/PODIATRY Progress Note      ASSESSMENT:   Ulceration right foot  Osteomyelitis calcaneus right   Ulceration right heel  Ulceration left heel        TREATMENT:  -I discussed with the patient and his sister today that the bilateral heel ulcerations and right midfoot ulceration are measuring significantly smaller than previous visit.    -I recommend he continue with endoform and nonweightbearing on the right foot.  Also recommend use of Prevalon boots bilateral feet at all times.    -Patient has finished course of antibiotics per ID    -After discussion of risk factors, nursing staff removed dressing, cleansed wound and consent obtained 2% Lidocaine HCL jelly was applied, under clean conditions, the bilateral heels and right midfoot ulceration(s) were debrided using #15 blade scalpel.  Devitalized and nonviable tissue, along with any fibrin and slough, was removed to improve granulation tissue formation, stimulate wound healing, decrease overall bacteria load, disrupt biofilm formation and decrease edge senescence. Wound drainage was scant No. Total excisional debridement was 44 sq cm into the subcutaneous tissue with a depth of 0.2 cm.   Ulcers were improved afterwards and .  Measures were as noted on the flow sheet. Collagen with a gauze dresssing was applied. He will continue to apply Collagen with a gauze dresssing as directed.    -He will follow-up in 3 weeks    Miguelangel Spears DPM  [facility]      HPI: Ever Crane was seen again today for right foot ulceration ulcerations.  He has been using endoform as directed.  Patient reports he does not like to use PRAFO boots but is willing to use the Prevalon boots on both feet.    Past Medical History:   Diagnosis Date    A-fib (H)     Acute diastolic heart failure (H) 06/07/2022    Acute posthemorrhagic anemia 05/17/2022    MESHA (acute kidney injury) (H)     Anemia     Atopic keratoconjunctivitis     Atrial  fibrillation (H)     Brian Moe: 8/2011 Cardioversion; CHADS2 VASC = 5; he is on warfarin and sotalol     Atrial flutter (H)     Mcgarry's esophagus 01/01/2012    per note of Dr. Tavo Mike of [facility]    Bilateral lower leg cellulitis 08/31/2018    BPH (benign prostatic hyperplasia)     Candidiasis of perineum 01/03/2018    Carotid stenosis     Carotid stenosis, asymptomatic, right     Cellulitis     CHF (congestive heart failure) (H)     Cholecystitis 10/14/2019    Cholecystitis, acute 08/18/2019    Chronic systolic heart failure (H)     Chronic venous stasis dermatitis     Closed nondisplaced intertrochanteric fracture of left femur with routine healing, subsequent encounter 05/18/2022    Clostridium difficile colitis     Contact with and (suspected) exposure to covid-19 12/13/2021    COPD (chronic obstructive pulmonary disease) (H)     Coronary artery disease due to lipid rich plaque 2000    CABG x2    Coronary atherosclerosis     Diabetes (H)     Diabetic ulcer of both feet (H) 10/31/2017    Diabetic ulcer of toe of right foot associated with diabetes mellitus due to underlying condition, limited to breakdown of skin (H) 01/05/2023    Diabetic ulcer of toe of right foot associated with diabetes mellitus due to underlying condition, with necrosis of bone (H) 01/05/2023    Dyslexia     Dyslipidemia, goal LDL below 70 2000    Epistaxis     Essential hypertension     Gangrene of left foot (H)     GERD (gastroesophageal reflux disease)     HLD (hyperlipidemia)     HTN (hypertension)     Hyponatremia     Ischemic heart disease     Lymphedema     Mild cognitive impairment     MRSA (methicillin resistant Staphylococcus aureus)     Neuropathy     Non-STEMI (non-ST elevated myocardial infarction) (H)     Occlusion and stenosis of right carotid artery     Osteoarthrosis     Osteomyelitis of ankle and foot (H)     Other atopic dermatitis     PAD (peripheral artery disease) (H)     Peripheral vascular disease (H)      Pneumonia 06/26/2018    Polyneuropathy due to type 2 diabetes mellitus (H)     Pressure ulcer, heel     Bilateral    Renal insufficiency     Type 2 diabetes mellitus, without long-term current use of insulin (H)     Ulcer of right foot (H)     Unable to function independently 11/13/2017       Past Surgical History:   Procedure Laterality Date    AMPUTATE FOOT Left 11/05/2017    Procedure: LEFT TRANSMETATARSAL AMPUTATION;  Surgeon: Ever Wick MD;  Location: [facility];  Service:     AMPUTATE FOOT Right 4/27/2023    Procedure: Transmetatarsal amputation right foot;  Surgeon: Miguelangel Spears DPM;  Location: [facility] [place]    AMPUTATE FOOT Right 5/15/2023    Procedure: partial calcanectomy;  Surgeon: Miguelangel Spears DPM;  Location: [facility] [place]    BYPASS GRAFT ARTERY CORONARY N/A 03/06/2000    SVG to OM1, SVG to PDA    BYPASS GRAFT ARTERY CORONARY N/A 10/04/2018    redo CABG due to graft failure    CABG MEASURES GRP      CARDIOVERSION  08/25/2011    CV CORONARY ANGIOGRAM N/A 10/01/2018    Procedure: Coronary Angiogram;  Surgeon: Miki Mac MD;  Location: [facility];  Service:     INCISION AND DRAINAGE FOOT, COMBINED Right 5/15/2023    Procedure: INCISION AND DRAINAGE, right heel with,;  Surgeon: Miguelangel Spears DPM;  Location: [facility] [place]    INCISION AND DRAINAGE FOOT, COMBINED Bilateral 6/1/2023    Procedure: INCISION AND DRAINAGE, right foot with debridement of ulceration bilateral heels;  Surgeon: Miguelangel Spears DPM;  Location: [facility] [place]    INGUINAL HERNIA REPAIR Bilateral 1969    and 1979    IR EXTREMITY ANGIOGRAM BILATERAL  11/3/2017    IR LOWER EXTREMITY ANGIOGRAM LEFT  3/10/2023    IR LOWER EXTREMITY ANGIOGRAM RIGHT  1/24/2023    LAPAROSCOPIC CHOLECYSTECTOMY N/A 08/18/2019    Procedure: CHOLECYSTECTOMY, LAPAROSCOPIC;  Surgeon: Stewart Fountain MD;  Location: [facility];  Service: General    LENGTHEN TENDON ACHILLES Right  4/27/2023    Procedure: with Achilles tendon lengthening, debridement of right heel ulceration.;  Surgeon: Miguelangel Spears DPM;  Location: [facility] OR       Allergies   Allergen Reactions    Cranberry Extract Itching and Rash    Doxycycline Rash    Latex Rash    Penicillin V Rash     Reaction occurred as a child. Patient tolerated Zosyn 6/2018, Cefazolin 10/2018, and has also tolerated Augmentin.    Penicillins Rash     Reaction occurred as a child. Patient tolerated Zosyn 6/2018, Cefazolin 10/2018, and has also tolerated Augmentin.         Current Outpatient Medications:     acetaminophen (TYLENOL) 325 MG tablet, 1000 mg BID scheduled and 650 mg bid prn, Disp: , Rfl:     albuterol (PROAIR HFA/PROVENTIL HFA/VENTOLIN HFA) 108 (90 Base) MCG/ACT inhaler, Inhale 2 puffs into the lungs 2 times daily as needed, Disp: , Rfl:     albuterol (PROVENTIL) (2.5 MG/3ML) 0.083% neb solution, Take 2.5 mg by nebulization every 6 hours as needed for shortness of breath, wheezing or cough, Disp: , Rfl:     clopidogrel (PLAVIX) 75 MG tablet, Take 75 mg by mouth daily, Disp: , Rfl:     dulaglutide (TRULICITY) 0.75 MG/0.5ML pen, Inject 0.75 mg Subcutaneous every 7 days, Disp: , Rfl:     furosemide (LASIX) 40 MG tablet, Take 40 mg by mouth daily, Disp: , Rfl:     gabapentin (NEURONTIN) 100 MG capsule, Take 200 mg by mouth 2 times daily, Disp: , Rfl:     ketoconazole (NIZORAL) 2 % external shampoo, Apply topically twice a week Mon and Fri., Disp: , Rfl:     losartan (COZAAR) 25 MG tablet, Take 12.5 mg by mouth daily, Disp: , Rfl:     mineral oil-hydrophilic petrolatum (AQUAPHOR) external ointment, Apply topically 2 times daily, Disp: , Rfl:     omeprazole (PRILOSEC) 20 MG CR capsule, Take 20 mg by mouth daily, Disp: , Rfl:     rivaroxaban ANTICOAGULANT (XARELTO) 15 MG TABS tablet, Take 20 mg by mouth daily, Disp: , Rfl:     simvastatin (ZOCOR) 40 MG tablet, Take 40 mg by mouth At Bedtime, Disp: , Rfl:     spironolactone  "(ALDACTONE) 25 MG tablet, Take 25 mg by mouth daily, Disp: , Rfl:     tamsulosin (FLOMAX) 0.4 MG capsule, Take 0.4 mg by mouth daily, Disp: , Rfl:     Review of Systems - 10 point Review of Systems is negative except for bilateral foot ulcers which is noted in HPI.      OBJECTIVE:  /64   Pulse 65   Resp 16   Ht 5' 10\" (1.778 m)   Wt 183 lb (83 kg)   SpO2 94%   BMI 26.26 kg/m    General appearance: Patient is alert and fully cooperative with history & exam.  No sign of distress is noted during the visit.    Vascular: Dorsalis pedis non-palpableBilateral.  Dermatologic:    VASC Wound Right heel (Active)   Pre Size Length 3.5 08/24/23 1302   Pre Size Width 3 08/24/23 1302   Pre Size Depth 0.3 07/27/23 1400   Pre Total Sq cm 10.5 08/24/23 1302   Post Size Length 9 01/13/23 1300   Post Size Width 9 01/13/23 1300   Post Size Depth 0.1 01/13/23 1300   Post Total Sq cm 81 01/13/23 1300   Description eschar 04/05/23 1000       VASC Wound Left heel (Active)   Pre Size Length 0.8 08/24/23 1302   Pre Size Width 1 08/24/23 1302   Pre Size Depth 0.2 07/27/23 1400   Pre Total Sq cm 0.8 08/24/23 1302   Post Size Length 7 01/13/23 1300   Post Size Width 6 01/13/23 1300   Post Size Depth 0.1 01/13/23 1300   Post Total Sq cm 42 01/13/23 1300       VASC Wound Right Distal foot (Active)   Pre Size Length 2.2 08/24/23 1302   Pre Size Width 15 08/24/23 1302   Pre Size Depth 0.3 07/27/23 1400   Pre Total Sq cm 33 08/24/23 1302       Incision/Surgical Site 06/01/23 Bilateral Foot (Active)   Granular base bilateral heel ulcerations and right midfoot ulceration.  No erythema bilateral feet.  Neurologic: Diminished to light touch Bilateral.  Musculoskeletal: TMA right.        "

## 2023-09-14 ENCOUNTER — OFFICE VISIT (OUTPATIENT)
Dept: VASCULAR SURGERY | Facility: CLINIC | Age: 78
End: 2023-09-14
Attending: PODIATRIST
Payer: MEDICARE

## 2023-09-14 VITALS — OXYGEN SATURATION: 97 % | HEART RATE: 68 BPM | SYSTOLIC BLOOD PRESSURE: 133 MMHG | DIASTOLIC BLOOD PRESSURE: 58 MMHG

## 2023-09-14 DIAGNOSIS — L97.514 ULCER OF RIGHT FOOT WITH NECROSIS OF BONE (H): Primary | ICD-10-CM

## 2023-09-14 DIAGNOSIS — L89.613 PRESSURE ULCER OF RIGHT HEEL, STAGE 3 (H): ICD-10-CM

## 2023-09-14 DIAGNOSIS — L89.623 PRESSURE ULCER OF LEFT HEEL, STAGE 3 (H): ICD-10-CM

## 2023-09-14 PROCEDURE — 11042 DBRDMT SUBQ TIS 1ST 20SQCM/<: CPT | Performed by: PODIATRIST

## 2023-09-14 PROCEDURE — 11045 DBRDMT SUBQ TISS EACH ADDL: CPT | Performed by: PODIATRIST

## 2023-09-14 ASSESSMENT — PAIN SCALES - GENERAL: PAINLEVEL: NO PAIN (1)

## 2023-09-14 NOTE — PATIENT INSTRUCTIONS
Important lnstructions        WEIGHT BEARING STATUS: You are to remain NON WEIGHT BEARING on your right foot. NON WEIGHT BEARING MEANS NO PRESSURE ON YOUR FOOT OR HEEL AT ANY TIME FOR ANY REASON!    2. OFFLOADING DEVICE: Must use a A WHEELCHAIR at all times! (do not use affected foot to push wheelchair)    3. STABILIZATION DEVICE: Use a  Prevalon   BILATERALLY. You will need to WEAR THIS AT ALL TIMES EVEN WHILE IN BED.     4. ELEVATE: Elevating your leg means laying with your head on a pillow and your foot ABOVE YOUR HEART.     5. DO NOT MOVE YOUR FOOT.  There is a risk of worsening the wound or incision. To give yourself a higher chance of healing, please DO NOT swing foot back and forth and wiggle foot/toes especially when inside a stabilization device. Limited movement is allowable with therapy as recommended by the doctor.     6. TAKE A PROTEIN SHAKE TWICE A DAY.  (For ex: Boost, Ensure, Glucerna)      Dressing Change lnstructions:    RIGHT HEEL AND RIGHT DISTAL FOOT WOUNDS:    - Dressing Application of  Endoform for right heel & right distal foot wounds:    1. Endoform should be cut to the size of the wound.  It should touch the edges of the ulcer. It is okay if it overlap the edges a small amount.  Then, moisten the Endoform with saline.(If it is easier for you, you can moisten it before laying it in the wound)    2. Cover the wound with Endoform, followed by dry gauze, and secure with roll gauze if needed.     3. Endoform is naturally used by the body over time so you may not find it in the wound when you change your dressing.  If you do find some, gently cleanse the wound with saline. Do not remove the remaining Endoform, which may appear as an off-white to max gel, just add Endoform on top.  Usually, more Endoform will need to be added every 5-7 days.     4. Change your top dressing every 1-2 days or as needed depending on drainage.    -Endoform is a collagen dressing created from ovine (sheep)  fore-stomach tissue. The collagen extracellular matrix transforms into a soft conforming sheet, which is naturally incorporated into the wound over time.  Collagen dressings are used to stimulate wound healing.   ,      It IS NOT ok to get your wound wet in the bath or shower    SEEK MEDICAL CARE IF:  You have an increase in swelling, pain, or redness around the wound.  You have an increase in the amount of pus coming from the wound.  There is a bad smell coming from the wound.  The wound appears to be worsening/enlarging  You have a fever greater than 101.5 F      It is ok to continue current wound care treatment/products for the next 2-3 days until new wound care supplies are ordered and arrive. If longer than this please contact our office at [phone].        We want to hear from you!   In the next few weeks, you should receive a call or email to complete a survey about your visit at [facility]. Please help us improve your appointment experience by letting us know how we did today. We strive to make your experience good and value any ways in which we could do better.      We value your input and suggestions.    Thank you for choosing the [facility]!

## 2023-09-14 NOTE — PROGRESS NOTES
FOOT AND ANKLE SURGERY/PODIATRY Progress Note      ASSESSMENT:   Ulceration right foot  Osteomyelitis calcaneus right   Ulceration right heel      TREATMENT:  -Discussed with the patient and his sister today that the right foot ulceration is measuring smaller than the previous visit.  Right heel ulceration is measuring similar to the previous visit.  We discussed the importance of nonweightbearing on the right foot at all times and use of Prevalon boots for offloading.  Risks of weightbearing clued delayed wound healing and need for surgical intervention.    -After discussion of risk factors, nursing staff removed dressing, cleansed wound and consent obtained 2% Lidocaine HCL jelly was applied, under clean conditions, the right foot and heel ulceration(s) were debrided using #15 blade scalpel.  Devitalized and nonviable tissue, along with any fibrin and slough, was removed to improve granulation tissue formation, stimulate wound healing, decrease overall bacteria load, disrupt biofilm formation and decrease edge senescence. Wound drainage was scant No. Total excisional debridement was 39.64 sq cm into the subcutaneous tissue with a depth of 0.2 cm.   Ulcers were improved afterwards and .  Measures were as noted on the flow sheet. Collagen with a gauze dresssing was applied. He will continue to apply Collagen with a gauze dresssing as directed.    -He will follow-up in 3 weeks    Miguelangel Spears DPM  [facility] [place]      HPI: Ever Crane was seen again today for bilateral foot ulcerations.  Patient's sister is present today and states that her brother has been walking on both feet more than directed.    Past Medical History:   Diagnosis Date    A-fib (H)     Acute diastolic heart failure (H) 06/07/2022    Acute posthemorrhagic anemia 05/17/2022    MESHA (acute kidney injury) (H)     Anemia     Atopic keratoconjunctivitis     Atrial fibrillation (H)     Brian Moe: 8/2011 Cardioversion;  CHADS2 VASC = 5; he is on warfarin and sotalol     Atrial flutter (H)     Mcgarry's esophagus 01/01/2012    per [facility] of [facility] Tavo [facility] of [facility]    Bilateral lower leg cellulitis 08/31/2018    BPH (benign prostatic hyperplasia)     Candidiasis of perineum 01/03/2018    Carotid stenosis     Carotid stenosis, asymptomatic, right     Cellulitis     CHF (congestive heart failure) (H)     Cholecystitis 10/14/2019    Cholecystitis, acute 08/18/2019    Chronic systolic heart failure (H)     Chronic venous stasis dermatitis     Closed nondisplaced intertrochanteric fracture of left femur with routine healing, subsequent encounter 05/18/2022    Clostridium difficile colitis     Contact with and (suspected) exposure to covid-19 12/13/2021    COPD (chronic obstructive pulmonary disease) (H)     Coronary artery disease due to lipid rich plaque 2000    CABG x2    Coronary atherosclerosis     Diabetes (H)     Diabetic ulcer of both feet (H) 10/31/2017    Diabetic ulcer of toe of right foot associated with diabetes mellitus due to underlying condition, limited to breakdown of skin (H) 01/05/2023    Diabetic ulcer of toe of right foot associated with diabetes mellitus due to underlying condition, with necrosis of bone (H) 01/05/2023    Dyslexia     Dyslipidemia, goal LDL below 70 2000    Epistaxis     Essential hypertension     Gangrene of left foot (H)     GERD (gastroesophageal reflux disease)     HLD (hyperlipidemia)     HTN (hypertension)     Hyponatremia     Ischemic heart disease     Lymphedema     Mild cognitive impairment     MRSA (methicillin resistant Staphylococcus aureus)     Neuropathy     Non-STEMI (non-ST elevated myocardial infarction) (H)     Occlusion and stenosis of right carotid artery     Osteoarthrosis     Osteomyelitis of ankle and foot (H)     Other atopic dermatitis     PAD (peripheral artery disease) (H)     Peripheral vascular disease (H)     Pneumonia 06/26/2018    Polyneuropathy due to type 2 diabetes  mellitus (H)     Pressure ulcer, heel     Bilateral    Renal insufficiency     Type 2 diabetes mellitus, without long-term current use of insulin (H)     Ulcer of right foot (H)     Unable to function independently 11/13/2017       Past Surgical History:   Procedure Laterality Date    AMPUTATE FOOT Left 11/05/2017    Procedure: LEFT TRANSMETATARSAL AMPUTATION;  Surgeon: Ever Wick MD;  Location: [facility];  Service:     AMPUTATE FOOT Right 4/27/2023    Procedure: Transmetatarsal amputation right foot;  Surgeon: Miguelangel Spears DPM;  Location: [facility] [place]    AMPUTATE FOOT Right 5/15/2023    Procedure: partial calcanectomy;  Surgeon: Miguelangel Spears DPM;  Location: [facility]    BYPASS GRAFT ARTERY CORONARY N/A 03/06/2000    SVG to OM1, SVG to PDA    BYPASS GRAFT ARTERY CORONARY N/A 10/04/2018    redo CABG due to graft failure    CABG MEASURES GRP      CARDIOVERSION  08/25/2011    CV CORONARY ANGIOGRAM N/A 10/01/2018    Procedure: Coronary Angiogram;  Surgeon: Miki Mac MD;  Location: [facility];  Service:     INCISION AND DRAINAGE FOOT, COMBINED Right 5/15/2023    Procedure: INCISION AND DRAINAGE, right heel with,;  Surgeon: Miguelangel Spears DPM;  Location: [facility] [place] [facility]    INCISION AND DRAINAGE FOOT, COMBINED Bilateral 6/1/2023    Procedure: INCISION AND DRAINAGE, right foot with debridement of ulceration bilateral heels;  Surgeon: Miguelangel Spears DPM;  Location: [facility]    INGUINAL HERNIA REPAIR Bilateral 1969    and 1979    IR EXTREMITY ANGIOGRAM BILATERAL  11/3/2017    IR LOWER EXTREMITY ANGIOGRAM LEFT  3/10/2023    IR LOWER EXTREMITY ANGIOGRAM RIGHT  1/24/2023    LAPAROSCOPIC CHOLECYSTECTOMY N/A 08/18/2019    Procedure: CHOLECYSTECTOMY, LAPAROSCOPIC;  Surgeon: Stewart Fountain MD;  Location: [facility];  Service: General    LENGTHEN TENDON ACHILLES Right 4/27/2023    Procedure: with Achilles tendon lengthening,  debridement of right heel ulceration.;  Surgeon: Miguelangel Spears DPM;  Location: [facility] OR       Allergies   Allergen Reactions    Cranberry Extract Itching and Rash    Doxycycline Rash    Latex Rash    Penicillin V Rash     Reaction occurred as a child. Patient tolerated Zosyn 6/2018, Cefazolin 10/2018, and has also tolerated Augmentin.    Penicillins Rash     Reaction occurred as a child. Patient tolerated Zosyn 6/2018, Cefazolin 10/2018, and has also tolerated Augmentin.         Current Outpatient Medications:     acetaminophen (TYLENOL) 325 MG tablet, 1000 mg BID scheduled and 650 mg bid prn, Disp: , Rfl:     albuterol (PROAIR HFA/PROVENTIL HFA/VENTOLIN HFA) 108 (90 Base) MCG/ACT inhaler, Inhale 2 puffs into the lungs 2 times daily as needed, Disp: , Rfl:     albuterol (PROVENTIL) (2.5 MG/3ML) 0.083% neb solution, Take 2.5 mg by nebulization every 6 hours as needed for shortness of breath, wheezing or cough, Disp: , Rfl:     clopidogrel (PLAVIX) 75 MG tablet, Take 75 mg by mouth daily, Disp: , Rfl:     dulaglutide (TRULICITY) 0.75 MG/0.5ML pen, Inject 0.75 mg Subcutaneous every 7 days, Disp: , Rfl:     furosemide (LASIX) 40 MG tablet, Take 40 mg by mouth daily, Disp: , Rfl:     gabapentin (NEURONTIN) 100 MG capsule, Take 200 mg by mouth 2 times daily, Disp: , Rfl:     ketoconazole (NIZORAL) 2 % external shampoo, Apply topically twice a week Mon and Fri., Disp: , Rfl:     losartan (COZAAR) 25 MG tablet, Take 12.5 mg by mouth daily, Disp: , Rfl:     mineral oil-hydrophilic petrolatum (AQUAPHOR) external ointment, Apply topically 2 times daily, Disp: , Rfl:     omeprazole (PRILOSEC) 20 MG CR capsule, Take 20 mg by mouth daily, Disp: , Rfl:     rivaroxaban ANTICOAGULANT (XARELTO) 15 MG TABS tablet, Take 20 mg by mouth daily, Disp: , Rfl:     simvastatin (ZOCOR) 40 MG tablet, Take 40 mg by mouth At Bedtime, Disp: , Rfl:     spironolactone (ALDACTONE) 25 MG tablet, Take 25 mg by mouth daily, Disp: , Rfl:      tamsulosin (FLOMAX) 0.4 MG capsule, Take 0.4 mg by mouth daily, Disp: , Rfl:     Review of Systems - 10 point Review of Systems is negative except for right foot ulcers which is noted in HPI.      OBJECTIVE:  /58   Pulse 68   SpO2 97%   General appearance: Patient is alert and fully cooperative with history & exam.  No sign of distress is noted during the visit.    Vascular: Dorsalis pedis non-palpableBilateral.  Dermatologic:    VASC Wound Right heel (Active)   Pre Size Length 3.8 09/14/23 1400   Pre Size Width 2.8 09/14/23 1400   Pre Size Depth 0.1 09/14/23 1400   Pre Total Sq cm 10.64 09/14/23 1400   Post Size Length 9 01/13/23 1300   Post Size Width 9 01/13/23 1300   Post Size Depth 0.1 01/13/23 1300   Post Total Sq cm 81 01/13/23 1300   Description eschar 04/05/23 1000       VASC Wound Left heel (Active)   Pre Size Length 0.8 08/24/23 1302   Pre Size Width 1 08/24/23 1302   Pre Size Depth 0.2 07/27/23 1400   Pre Total Sq cm 0.8 08/24/23 1302   Post Size Length 7 01/13/23 1300   Post Size Width 6 01/13/23 1300   Post Size Depth 0.1 01/13/23 1300   Post Total Sq cm 42 01/13/23 1300       VASC Wound Right Distal foot (Active)   Pre Size Length 2 09/14/23 1400   Pre Size Width 14 09/14/23 1400   Pre Size Depth 0.2 09/14/23 1400   Pre Total Sq cm 28 09/14/23 1400       Incision/Surgical Site 06/01/23 Bilateral Foot (Active)   Granular base right foot and right heel ulcers.  No erythema bilateral feet.  Neurologic: Diminished to light touch Bilateral.  Musculoskeletal: TMA bilateral.

## 2023-09-21 VITALS
HEIGHT: 70 IN | TEMPERATURE: 98.6 F | DIASTOLIC BLOOD PRESSURE: 59 MMHG | HEART RATE: 74 BPM | SYSTOLIC BLOOD PRESSURE: 104 MMHG | WEIGHT: 187.5 LBS | BODY MASS INDEX: 26.84 KG/M2 | OXYGEN SATURATION: 95 % | RESPIRATION RATE: 18 BRPM

## 2023-09-22 ENCOUNTER — TRANSITIONAL CARE UNIT VISIT (OUTPATIENT)
Dept: GERIATRICS | Facility: CLINIC | Age: 78
End: 2023-09-22
Payer: MEDICARE

## 2023-09-22 DIAGNOSIS — I10 ESSENTIAL HYPERTENSION: Chronic | ICD-10-CM

## 2023-09-22 DIAGNOSIS — I73.9 PAD (PERIPHERAL ARTERY DISEASE) (H): Chronic | ICD-10-CM

## 2023-09-22 DIAGNOSIS — I25.728 CORONARY ARTERY DISEASE OF AUTOLOGOUS BYPASS GRAFT WITH STABLE ANGINA PECTORIS (H): Primary | Chronic | ICD-10-CM

## 2023-09-22 DIAGNOSIS — E11.59 TYPE 2 DIABETES MELLITUS WITH OTHER CIRCULATORY COMPLICATION, WITH LONG-TERM CURRENT USE OF INSULIN (H): Chronic | ICD-10-CM

## 2023-09-22 DIAGNOSIS — Z79.4 TYPE 2 DIABETES MELLITUS WITH OTHER CIRCULATORY COMPLICATION, WITH LONG-TERM CURRENT USE OF INSULIN (H): Chronic | ICD-10-CM

## 2023-09-22 PROCEDURE — 99310 SBSQ NF CARE HIGH MDM 45: CPT | Performed by: FAMILY MEDICINE

## 2023-09-22 NOTE — LETTER
9/22/2023        RE: Ever Crane  [address]  [address]  [place] [address]        [facility]    Facility:   [facility] (U) [[id number]]   Code Status: FULL CODE      CHIEF COMPLAINT/REASON FOR VISIT:  Chief Complaint   Patient presents with     RECHECK       HISTORY:      HPI: Ever is a 78 year old male who currently does reside in the transitional care unit room 170 and via the opportunity to visit with today not only secondary to his prolonged stay on the transitional care unit but he was admitted to the transitional care unit January 17, 2023 secondary to history of PVD, diabetic ulceration second through fifth digits of the right foot, ulceration bilateral heels, ulceration left leg and osteomyelitis of the right foot including fourth PIP and DIP as well as the fifth DIP.  April 27, 2023.  Transmetatarsal amputation of the right foot with right Achilles tendon lengthening and excisional debridement of the right heel ulceration.  May 15, 2023 incision and drainage of the right heel with wound VAC placement.  May 23, 2023 wound VAC came off.  June 1, 2023 incision and drainage of the right foot, exostectomy of the first metatarsal of the right foot, and debridement of ulceration bilateral heels with wound VAC placement.  June 2023 wound VAC held due to concerns related to right lower extremity swelling.  July 7, 2023 wound VAC reapplied  July 27, 2023 wound VAC discontinued.  I had the opportunity to visit with him today regarding his chronic medical conditions.  Very nice gentleman.  He grew up in the [place] area.  He did not play much for sports however he enjoyed playing swamp hockey.  He has been normotensive and afebrile and also on room air.  His pain is also well managed does have Tylenol scheduled 1000 g twice daily and gabapentin 200 mg twice daily.  He does have diabetes Accu-Cheks over the past 2 weeks have been ranging 1  and he is on Trulicity 0.75 and now  increase that to 1.5 mg subcutaneous once weekly.  He is on Xarelto for anticoagulation as well as Plavix.  He is on omeprazole 20 mg daily for GERD.  He is not required any albuterol nebs so that will now be discontinued.  He is on a regular diet and getting Glucerna twice daily.  He did have a visit last with podiatry on September 14 for the ulcer of the right foot, osteomyelitis of the right calcaneus as well as ulcer of the right heel and the wound itself has decreased in size he is nonweightbearing he did have an excisional debridement and a follow-up in 3 weeks.  Past Medical History:   Diagnosis Date     A-fib (H)      Acute diastolic heart failure (H) 06/07/2022     Acute posthemorrhagic anemia 05/17/2022     MESHA (acute kidney injury) (H)      Anemia      Atopic keratoconjunctivitis      Atrial fibrillation (H)     Brian Moe: 8/2011 Cardioversion; CHADS2 VASC = 5; he is on warfarin and sotalol      Atrial flutter (H)      Mcgarry's esophagus 01/01/2012    per note of Dr. Tavo Mike of [facility]     Bilateral lower leg cellulitis 08/31/2018     BPH (benign prostatic hyperplasia)      Candidiasis of perineum 01/03/2018     Carotid stenosis      Carotid stenosis, asymptomatic, right      Cellulitis      CHF (congestive heart failure) (H)      Cholecystitis 10/14/2019     Cholecystitis, acute 08/18/2019     Chronic systolic heart failure (H)      Chronic venous stasis dermatitis      Closed nondisplaced intertrochanteric fracture of left femur with routine healing, subsequent encounter 05/18/2022     Clostridium difficile colitis      Contact with and (suspected) exposure to covid-19 12/13/2021     COPD (chronic obstructive pulmonary disease) (H)      Coronary artery disease due to lipid rich plaque 2000    CABG x2     Coronary atherosclerosis      Diabetes (H)      Diabetic ulcer of both feet (H) 10/31/2017     Diabetic ulcer of toe of right foot associated with diabetes mellitus due to underlying condition,  limited to breakdown of skin (H) 2023     Diabetic ulcer of toe of right foot associated with diabetes mellitus due to underlying condition, with necrosis of bone (H) 2023     Dyslexia      Dyslipidemia, goal LDL below 70 2000     Epistaxis      Essential hypertension      Gangrene of left foot (H)      GERD (gastroesophageal reflux disease)      HLD (hyperlipidemia)      HTN (hypertension)      Hyponatremia      Ischemic heart disease      Lymphedema      Mild cognitive impairment      MRSA (methicillin resistant Staphylococcus aureus)      Neuropathy      Non-STEMI (non-ST elevated myocardial infarction) (H)      Occlusion and stenosis of right carotid artery      Osteoarthrosis      Osteomyelitis of ankle and foot (H)      Other atopic dermatitis      PAD (peripheral artery disease) (H)      Peripheral vascular disease (H)      Pneumonia 2018     Polyneuropathy due to type 2 diabetes mellitus (H)      Pressure ulcer, heel     Bilateral     Renal insufficiency      Type 2 diabetes mellitus, without long-term current use of insulin (H)      Ulcer of right foot (H)      Unable to function independently 2017            Family History   Problem Relation Age of Onset     Sudden Death Mother 85.00     CABG Father      Valvular heart disease Father      Esophageal Cancer Father 58.00        cause of death     No Known Problems Son      No Known Problems Sister      Obesity Sister      Osteoarthritis Sister      No Known Problems Sister      No Known Problems Grandchild      No Known Problems Grandchild       Social History     Socioeconomic History     Marital status:      Number of children: 1   Tobacco Use     Smoking status: Former     Packs/day: 1.00     Years: 36.00     Pack years: 36.00     Types: Cigarettes     Start date: 1964     Quit date: 2000     Years since quittin.4     Smokeless tobacco: Never   Substance and Sexual Activity     Alcohol use: Not Currently      "Alcohol/week: 5.0 standard drinks of alcohol     Types: 1 Cans of beer, 4 Standard drinks or equivalent per week     Drug use: No     Sexual activity: Not Currently     Partners: Female   Social History Narrative    Ever lived with his [place] Raciel in [place], [place], but then he went to [facility] after foot amputation.  Ever states he will move to a facility in [place] in June, 2018. 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  Our reception staff at [facility] in [place] confirmed on June 13, 2018 that Ever brought himself to the campus and did not utilize a family member or ride service.  I  would also note that he states his granddaughter is name Claire and not Leonela, so I corrected that in the chart (ROSALIA Moe MD). He lives in [facility] in 2019 and is now not driving as he states \"I got a ticket\".           REVIEW OF SYSTEM:  Currently denies any new symptoms of fever cough or cold sore throat postnasal drip shortness of breath dyspnea wheezing chest pain dizziness or vertigo nausea vomiting diarrhea dysuria frequency urgency headache or myalgias.  History of PVD, diabetes, polyneuropathy, hypertension, CKD stage III    PHYSICAL EXAM:   Pleasant gentleman in no acute distress.  Head is normocephalic.  Conjunctiva is pink and sclera is clear.  Neck is supple without adenopathy.  Lung sounds are clear throughout.  Cardiovascular S1-S2 regular rate and rhythm and no lower extremity edema.  Gastrointestinal soft nontender positive bowel sounds.  Musculoskeletal strong upper and lower extremities.  Nonweightbearing.  Needs assistance with ADLs.  Pain is managed.  Does not have lower extremity pain to palpation.  Psychiatric: Pleasant affect.  He does have bilateral lower extremity wounds along with Kerlix leg wraps that are being seen by the wound team.     /59  " " Pulse 74   Temp 98.6  F (37  C)   Resp 18   Ht 1.778 m (5' 10\")   Wt 85 kg (187 lb 8 oz)   SpO2 95%   BMI 26.90 kg/m      LABS:   Last Comprehensive Metabolic Panel:  Sodium   Date Value Ref Range Status   08/18/2023 140 136 - 145 mmol/L Final   06/30/2018 139 133 - 144 mmol/L Final     Potassium   Date Value Ref Range Status   08/18/2023 4.4 3.4 - 5.3 mmol/L Final   01/24/2023 5.5 (H) 3.5 - 5.0 mmol/L Final   06/30/2018 4.1 3.4 - 5.3 mmol/L Final     Chloride   Date Value Ref Range Status   08/18/2023 104 98 - 107 mmol/L Final   01/24/2023 107 98 - 107 mmol/L Final   06/30/2018 108 94 - 109 mmol/L Final     Carbon Dioxide   Date Value Ref Range Status   06/30/2018 20 20 - 32 mmol/L Final     Carbon Dioxide (CO2)   Date Value Ref Range Status   08/18/2023 24 22 - 29 mmol/L Final   01/24/2023 21 (L) 22 - 31 mmol/L Final     Anion Gap   Date Value Ref Range Status   08/18/2023 12 7 - 15 mmol/L Final   01/24/2023 11 5 - 18 mmol/L Final   06/30/2018 11 3 - 14 mmol/L Final     Glucose   Date Value Ref Range Status   08/18/2023 165 (H) 70 - 99 mg/dL Final   01/24/2023 106 70 - 125 mg/dL Final   06/30/2018 162 (H) 70 - 99 mg/dL Final     GLUCOSE BY METER POCT   Date Value Ref Range Status   06/01/2023 117 (H) 70 - 99 mg/dL Final     Urea Nitrogen   Date Value Ref Range Status   08/18/2023 21.4 8.0 - 23.0 mg/dL Final   01/24/2023 22 8 - 28 mg/dL Final   06/30/2018 22 7 - 30 mg/dL Final     Creatinine   Date Value Ref Range Status   08/18/2023 1.07 0.67 - 1.17 mg/dL Final   06/30/2018 1.20 0.66 - 1.25 mg/dL Final     GFR Estimate   Date Value Ref Range Status   08/18/2023 71 >60 mL/min/1.73m2 Final   03/09/2021 46 (L) >60 mL/min/1.73m2 Final   06/30/2018 59 (L) >60 mL/min/1.7m2 Final     Comment:     Non  GFR Calc     Calcium   Date Value Ref Range Status   08/18/2023 9.4 8.8 - 10.2 mg/dL Final   06/30/2018 9.0 8.5 - 10.1 mg/dL Final     Bilirubin Total   Date Value Ref Range Status   08/18/2023 0.4 " <=1.2 mg/dL Final   06/29/2018 1.0 0.2 - 1.3 mg/dL Final     Alkaline Phosphatase   Date Value Ref Range Status   08/18/2023 108 40 - 129 U/L Final   06/29/2018 73 40 - 150 U/L Final     ALT   Date Value Ref Range Status   08/18/2023 9 0 - 70 U/L Final     Comment:     Reference intervals for this test were updated on 6/12/2023 to more accurately reflect our healthy population. There may be differences in the flagging of prior results with similar values performed with this method. Interpretation of those prior results can be made in the context of the updated reference intervals.     06/29/2018 25 0 - 70 U/L Final     AST   Date Value Ref Range Status   08/18/2023 13 0 - 45 U/L Final     Comment:     Reference intervals for this test were updated on 6/12/2023 to more accurately reflect our healthy population. There may be differences in the flagging of prior results with similar values performed with this method. Interpretation of those prior results can be made in the context of the updated reference intervals.   06/29/2018 24 0 - 45 U/L Final             \  Lab Results   Component Value Date    A1C 6.6 05/24/2023    A1C 6.7 05/15/2023    A1C 7.0 07/12/2022    A1C 6.3 10/21/2021    A1C 6.9 03/09/2021    A1C 7.7 06/26/2018    A1C 7.3 04/20/2018     CBC RESULTS:   Recent Labs   Lab Test 08/18/23  0828   WBC 8.3   RBC 4.59   HGB 11.4*   HCT 37.0*   MCV 81   MCH 24.8*   MCHC 30.8*   RDW 15.9*              ASSESSMENT:    Encounter Diagnoses   Name Primary?     Coronary artery disease of autologous bypass graft with stable angina pectoris (H) Yes     Type 2 diabetes mellitus with other circulatory complication, with long-term current use of insulin (H)      Essential hypertension      PAD (peripheral artery disease) (H)        PLAN:    He does have his legs wrapped and recently did see podiatry follow-up in 3 weeks.  Increasing Trulicity 1.5 mg once daily subcutaneously.  A1c in May was 6.6.  No pain.  Appetite  good.  Denies any bowel or bladder issues.  He did not have any other questions.            Electronically signed by: Wagner Mancuso NP      Sincerely,        Wagner Mancuso NP

## 2023-09-22 NOTE — PROGRESS NOTES
[facility] GERIATRIC SERVICES    Facility:   [facility] (U) [4002]   Code Status: FULL CODE      CHIEF COMPLAINT/REASON FOR VISIT:  Chief Complaint   Patient presents with    RECHECK       HISTORY:      HPI: Ever is a 78 year old male who currently does reside in the transitional care unit room 170 and via the opportunity to visit with today not only secondary to his prolonged stay on the transitional care unit but he was admitted to the transitional care unit January 17, 2023 secondary to history of PVD, diabetic ulceration second through fifth digits of the right foot, ulceration bilateral heels, ulceration left leg and osteomyelitis of the right foot including fourth PIP and DIP as well as the fifth DIP.  April 27, 2023.  Transmetatarsal amputation of the right foot with right Achilles tendon lengthening and excisional debridement of the right heel ulceration.  May 15, 2023 incision and drainage of the right heel with wound VAC placement.  May 23, 2023 wound VAC came off.  June 1, 2023 incision and drainage of the right foot, exostectomy of the first metatarsal of the right foot, and debridement of ulceration bilateral heels with wound VAC placement.  June 2023 wound VAC held due to concerns related to right lower extremity swelling.  July 7, 2023 wound VAC reapplied  July 27, 2023 wound VAC discontinued.  I had the opportunity to visit with him today regarding his chronic medical conditions.  Very nice gentleman.  He grew up in the [place] area.  He did not play much for sports however he enjoyed playing swamp hockey.  He has been normotensive and afebrile and also on room air.  His pain is also well managed does have Tylenol scheduled 1000 g twice daily and gabapentin 200 mg twice daily.  He does have diabetes Accu-Cheks over the past 2 weeks have been ranging 1  and he is on Trulicity 0.75 and now increase that to 1.5 mg subcutaneous once weekly.  He is on Xarelto for anticoagulation as well as  Plavix.  He is on omeprazole 20 mg daily for GERD.  He is not required any albuterol nebs so that will now be discontinued.  He is on a regular diet and getting Glucerna twice daily.  He did have a visit last with podiatry on September 14 for the ulcer of the right foot, osteomyelitis of the right calcaneus as well as ulcer of the right heel and the wound itself has decreased in size he is nonweightbearing he did have an excisional debridement and a follow-up in 3 weeks.  Past Medical History:   Diagnosis Date    A-fib (H)     Acute diastolic heart failure (H) 06/07/2022    Acute posthemorrhagic anemia 05/17/2022    MESHA (acute kidney injury) (H)     Anemia     Atopic keratoconjunctivitis     Atrial fibrillation (H)     Brian Moe: 8/2011 Cardioversion; CHADS2 VASC = 5; he is on warfarin and sotalol     Atrial flutter (H)     Mcgarry's esophagus 01/01/2012    per note of Dr. Tavo Mike of [facility]    Bilateral lower leg cellulitis 08/31/2018    BPH (benign prostatic hyperplasia)     Candidiasis of perineum 01/03/2018    Carotid stenosis     Carotid stenosis, asymptomatic, right     Cellulitis     CHF (congestive heart failure) (H)     Cholecystitis 10/14/2019    Cholecystitis, acute 08/18/2019    Chronic systolic heart failure (H)     Chronic venous stasis dermatitis     Closed nondisplaced intertrochanteric fracture of left femur with routine healing, subsequent encounter 05/18/2022    Clostridium difficile colitis     Contact with and (suspected) exposure to covid-19 12/13/2021    COPD (chronic obstructive pulmonary disease) (H)     Coronary artery disease due to lipid rich plaque 2000    CABG x2    Coronary atherosclerosis     Diabetes (H)     Diabetic ulcer of both feet (H) 10/31/2017    Diabetic ulcer of toe of right foot associated with diabetes mellitus due to underlying condition, limited to breakdown of skin (H) 01/05/2023    Diabetic ulcer of toe of right foot associated with diabetes mellitus due to  underlying condition, with necrosis of bone (H) 2023    Dyslexia     Dyslipidemia, goal LDL below 70 2000    Epistaxis     Essential hypertension     Gangrene of left foot (H)     GERD (gastroesophageal reflux disease)     HLD (hyperlipidemia)     HTN (hypertension)     Hyponatremia     Ischemic heart disease     Lymphedema     Mild cognitive impairment     MRSA (methicillin resistant Staphylococcus aureus)     Neuropathy     Non-STEMI (non-ST elevated myocardial infarction) (H)     Occlusion and stenosis of right carotid artery     Osteoarthrosis     Osteomyelitis of ankle and foot (H)     Other atopic dermatitis     PAD (peripheral artery disease) (H)     Peripheral vascular disease (H)     Pneumonia 2018    Polyneuropathy due to type 2 diabetes mellitus (H)     Pressure ulcer, heel     Bilateral    Renal insufficiency     Type 2 diabetes mellitus, without long-term current use of insulin (H)     Ulcer of right foot (H)     Unable to function independently 2017            Family History   Problem Relation Age of Onset    Sudden Death Mother 85.00    CABG Father     Valvular heart disease Father     Esophageal Cancer Father 58.00        cause of death    No Known Problems Son     No Known Problems Sister     Obesity Sister     Osteoarthritis Sister     No Known Problems Sister     No Known Problems Grandchild     No Known Problems Grandchild       Social History     Socioeconomic History    Marital status:     Number of children: 1   Tobacco Use    Smoking status: Former     Packs/day: 1.00     Years: 36.00     Pack years: 36.00     Types: Cigarettes     Start date: 1964     Quit date: 2000     Years since quittin.4    Smokeless tobacco: Never   Substance and Sexual Activity    Alcohol use: Not Currently     Alcohol/week: 5.0 standard drinks of alcohol     Types: 1 Cans of beer, 4 Standard drinks or equivalent per week    Drug use: No    Sexual activity: Not Currently      "Partners: Female   Social History Narrative    Ever lived with his son Raciel in [place], [place], but then he went to [facility] after foot amputation.  Ever states he will move to a facility in [place] in June, 2018. 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  Our reception staff at [facility] in [place] confirmed on June 13, 2018 that Ever brought himself to the campus and did not utilize a family member or ride service.  I  would also note that he states his granddaughter is name Claire and not Leonela, so I corrected that in the chart (ROSALIA Moe MD). He lives in [facility] in 2019 and is now not driving as he states \"I got a ticket\".           REVIEW OF SYSTEM:  Currently denies any new symptoms of fever cough or cold sore throat postnasal drip shortness of breath dyspnea wheezing chest pain dizziness or vertigo nausea vomiting diarrhea dysuria frequency urgency headache or myalgias.  History of PVD, diabetes, polyneuropathy, hypertension, CKD stage III    PHYSICAL EXAM:   Pleasant gentleman in no acute distress.  Head is normocephalic.  Conjunctiva is pink and sclera is clear.  Neck is supple without adenopathy.  Lung sounds are clear throughout.  Cardiovascular S1-S2 regular rate and rhythm and no lower extremity edema.  Gastrointestinal soft nontender positive bowel sounds.  Musculoskeletal strong upper and lower extremities.  Nonweightbearing.  Needs assistance with ADLs.  Pain is managed.  Does not have lower extremity pain to palpation.  Psychiatric: Pleasant affect.  He does have bilateral lower extremity wounds along with Kerlix leg wraps that are being seen by the wound team.     /59   Pulse 74   Temp 98.6  F (37  C)   Resp 18   Ht 1.778 m (5' 10\")   Wt 85 kg (187 lb 8 oz)   SpO2 95%   BMI 26.90 kg/m      LABS:   Last Comprehensive Metabolic " Panel:  Sodium   Date Value Ref Range Status   08/18/2023 140 136 - 145 mmol/L Final   06/30/2018 139 133 - 144 mmol/L Final     Potassium   Date Value Ref Range Status   08/18/2023 4.4 3.4 - 5.3 mmol/L Final   01/24/2023 5.5 (H) 3.5 - 5.0 mmol/L Final   06/30/2018 4.1 3.4 - 5.3 mmol/L Final     Chloride   Date Value Ref Range Status   08/18/2023 104 98 - 107 mmol/L Final   01/24/2023 107 98 - 107 mmol/L Final   06/30/2018 108 94 - 109 mmol/L Final     Carbon Dioxide   Date Value Ref Range Status   06/30/2018 20 20 - 32 mmol/L Final     Carbon Dioxide (CO2)   Date Value Ref Range Status   08/18/2023 24 22 - 29 mmol/L Final   01/24/2023 21 (L) 22 - 31 mmol/L Final     Anion Gap   Date Value Ref Range Status   08/18/2023 12 7 - 15 mmol/L Final   01/24/2023 11 5 - 18 mmol/L Final   06/30/2018 11 3 - 14 mmol/L Final     Glucose   Date Value Ref Range Status   08/18/2023 165 (H) 70 - 99 mg/dL Final   01/24/2023 106 70 - 125 mg/dL Final   06/30/2018 162 (H) 70 - 99 mg/dL Final     GLUCOSE BY METER POCT   Date Value Ref Range Status   06/01/2023 117 (H) 70 - 99 mg/dL Final     Urea Nitrogen   Date Value Ref Range Status   08/18/2023 21.4 8.0 - 23.0 mg/dL Final   01/24/2023 22 8 - 28 mg/dL Final   06/30/2018 22 7 - 30 mg/dL Final     Creatinine   Date Value Ref Range Status   08/18/2023 1.07 0.67 - 1.17 mg/dL Final   06/30/2018 1.20 0.66 - 1.25 mg/dL Final     GFR Estimate   Date Value Ref Range Status   08/18/2023 71 >60 mL/min/1.73m2 Final   03/09/2021 46 (L) >60 mL/min/1.73m2 Final   06/30/2018 59 (L) >60 mL/min/1.7m2 Final     Comment:     Non  GFR Calc     Calcium   Date Value Ref Range Status   08/18/2023 9.4 8.8 - 10.2 mg/dL Final   06/30/2018 9.0 8.5 - 10.1 mg/dL Final     Bilirubin Total   Date Value Ref Range Status   08/18/2023 0.4 <=1.2 mg/dL Final   06/29/2018 1.0 0.2 - 1.3 mg/dL Final     Alkaline Phosphatase   Date Value Ref Range Status   08/18/2023 108 40 - 129 U/L Final   06/29/2018 73 40 -  150 U/L Final     ALT   Date Value Ref Range Status   08/18/2023 9 0 - 70 U/L Final     Comment:     Reference intervals for this test were updated on 6/12/2023 to more accurately reflect our healthy population. There may be differences in the flagging of prior results with similar values performed with this method. Interpretation of those prior results can be made in the context of the updated reference intervals.     06/29/2018 25 0 - 70 U/L Final     AST   Date Value Ref Range Status   08/18/2023 13 0 - 45 U/L Final     Comment:     Reference intervals for this test were updated on 6/12/2023 to more accurately reflect our healthy population. There may be differences in the flagging of prior results with similar values performed with this method. Interpretation of those prior results can be made in the context of the updated reference intervals.   06/29/2018 24 0 - 45 U/L Final             \  Lab Results   Component Value Date    A1C 6.6 05/24/2023    A1C 6.7 05/15/2023    A1C 7.0 07/12/2022    A1C 6.3 10/21/2021    A1C 6.9 03/09/2021    A1C 7.7 06/26/2018    A1C 7.3 04/20/2018     CBC RESULTS:   Recent Labs   Lab Test 08/18/23  0828   WBC 8.3   RBC 4.59   HGB 11.4*   HCT 37.0*   MCV 81   MCH 24.8*   MCHC 30.8*   RDW 15.9*              ASSESSMENT:    Encounter Diagnoses   Name Primary?    Coronary artery disease of autologous bypass graft with stable angina pectoris (H) Yes    Type 2 diabetes mellitus with other circulatory complication, with long-term current use of insulin (H)     Essential hypertension     PAD (peripheral artery disease) (H)        PLAN:    He does have his legs wrapped and recently did see podiatry follow-up in 3 weeks.  Increasing Trulicity 1.5 mg once daily subcutaneously.  A1c in May was 6.6.  No pain.  Appetite good.  Denies any bowel or bladder issues.  He did not have any other questions.            Electronically signed by: Wagner Mancuso NP

## 2023-09-24 ENCOUNTER — TELEPHONE (OUTPATIENT)
Dept: GERIATRICS | Facility: CLINIC | Age: 78
End: 2023-09-24
Payer: MEDICARE

## 2023-09-24 NOTE — CONFIDENTIAL NOTE
MARION houser reported patient has LE wound that was wrapped in kerlex. Has daily dressing changes, when changed today noted several maggots. Patient's leg was washed with wound cleanser and dressed.    Plan:  Continue daily wound care, RN wound will assess tomorrow.       Sariah Harper NP

## 2023-09-25 ENCOUNTER — TELEPHONE (OUTPATIENT)
Dept: INFECTIOUS DISEASES | Facility: CLINIC | Age: 78
End: 2023-09-25
Payer: MEDICARE

## 2023-09-25 DIAGNOSIS — Z22.322 MRSA COLONIZATION: Primary | ICD-10-CM

## 2023-09-25 NOTE — TELEPHONE ENCOUNTER
I called the pt's sister, she is wondering if Ever should complete the last two MRSA nasal swabs. I informed I will discuss with the MD and contact her back.

## 2023-09-25 NOTE — TELEPHONE ENCOUNTER
[facility] Call [facility]    Phone Message    May a detailed message be left on voicemail: yes     Reason for Call: Other: Caller needs to speak with nurse regarding  her brothers treatment for MRSA .   Thank you     Action Taken: Other: ID     Travel Screening: Not Applicable

## 2023-09-26 VITALS
OXYGEN SATURATION: 94 % | HEART RATE: 65 BPM | RESPIRATION RATE: 18 BRPM | HEIGHT: 70 IN | SYSTOLIC BLOOD PRESSURE: 116 MMHG | WEIGHT: 188.8 LBS | BODY MASS INDEX: 27.03 KG/M2 | DIASTOLIC BLOOD PRESSURE: 67 MMHG | TEMPERATURE: 98.3 F

## 2023-09-27 ENCOUNTER — TRANSITIONAL CARE UNIT VISIT (OUTPATIENT)
Dept: GERIATRICS | Facility: CLINIC | Age: 78
End: 2023-09-27
Payer: MEDICARE

## 2023-09-27 DIAGNOSIS — E78.2 DM TYPE 2 WITH DIABETIC MIXED HYPERLIPIDEMIA (H): Primary | Chronic | ICD-10-CM

## 2023-09-27 DIAGNOSIS — I10 ESSENTIAL HYPERTENSION: Chronic | ICD-10-CM

## 2023-09-27 DIAGNOSIS — E11.69 DM TYPE 2 WITH DIABETIC MIXED HYPERLIPIDEMIA (H): Primary | Chronic | ICD-10-CM

## 2023-09-27 DIAGNOSIS — I73.9 PAD (PERIPHERAL ARTERY DISEASE) (H): Chronic | ICD-10-CM

## 2023-09-27 DIAGNOSIS — I87.2 CHRONIC VENOUS STASIS DERMATITIS: ICD-10-CM

## 2023-09-27 PROCEDURE — 99309 SBSQ NF CARE MODERATE MDM 30: CPT | Performed by: FAMILY MEDICINE

## 2023-09-27 NOTE — TELEPHONE ENCOUNTER
Yes, this can be checked 2 more times at least a week apart -- he needs to be off antibiotics when these are checked. If all negative, if he is in the hospital in the future he may be able to be out of precautions for his previous history of MRSA.     Wagner Cr MD

## 2023-09-27 NOTE — LETTER
9/27/2023        RE: Ever Crane  [address]  [address]  [place] [address]        [facility]    Facility:   [facility] (U) [[id number]]   Code Status: FULL CODE      CHIEF COMPLAINT/REASON FOR VISIT:  Chief Complaint   Patient presents with     RECHECK       HISTORY:      HPI: Ever is a 78 year old male who I had the pleasure of revisiting with once again today not only secondary to his hospitalization January 17, 2023 secondary to PVD along with diabetic ulcerations and various amputations as well as today's evaluation of his legs, diabetes, blood pressures and nutrition.  His systolic blood pressures have been normotensive.  He has history of diabetes his Accu-Cheks over the past 2 weeks ranging 1  mostly less than 190.  Recently increase the Trulicity 1.5 rather than 0.75.  For pain is on both gabapentin and Tylenol.  His appetite is good.  Does have a good sense of humor.  Regarding his right lower extremity he is getting Vanicream and he does have PVD along with venous stasis dermatitis a few soft areas otherwise no secondary infection.  He also does have a right heel ulcer about 3-4 cm which is nice and clean and there is no discharge.  His left leg also does have venous stasis dermatitis but no ulcerations or infections.  He does need full assistance with all ADLs.  He does have follow-up visits with the infectious disease specialist.    Past Medical History:   Diagnosis Date     A-fib (H)      Acute diastolic heart failure (H) 06/07/2022     Acute posthemorrhagic anemia 05/17/2022     MESHA (acute kidney injury) (H)      Anemia      Atopic keratoconjunctivitis      Atrial fibrillation (H)     Brian Moe: 8/2011 Cardioversion; CHADS2 VASC = 5; he is on warfarin and sotalol      Atrial flutter (H)      Mcgarry's esophagus 01/01/2012    per note of Dr. Tavo Mike of [facility]     Bilateral lower leg cellulitis 08/31/2018     BPH (benign prostatic hyperplasia)       Candidiasis of perineum 01/03/2018     Carotid stenosis      Carotid stenosis, asymptomatic, right      Cellulitis      CHF (congestive heart failure) (H)      Cholecystitis 10/14/2019     Cholecystitis, acute 08/18/2019     Chronic systolic heart failure (H)      Chronic venous stasis dermatitis      Closed nondisplaced intertrochanteric fracture of left femur with routine healing, subsequent encounter 05/18/2022     Clostridium difficile colitis      Contact with and (suspected) exposure to covid-19 12/13/2021     COPD (chronic obstructive pulmonary disease) (H)      Coronary artery disease due to lipid rich plaque 2000    CABG x2     Coronary atherosclerosis      Diabetes (H)      Diabetic ulcer of both feet (H) 10/31/2017     Diabetic ulcer of toe of right foot associated with diabetes mellitus due to underlying condition, limited to breakdown of skin (H) 01/05/2023     Diabetic ulcer of toe of right foot associated with diabetes mellitus due to underlying condition, with necrosis of bone (H) 01/05/2023     Dyslexia      Dyslipidemia, goal LDL below 70 2000     Epistaxis      Essential hypertension      Gangrene of left foot (H)      GERD (gastroesophageal reflux disease)      HLD (hyperlipidemia)      HTN (hypertension)      Hyponatremia      Ischemic heart disease      Lymphedema      Mild cognitive impairment      MRSA (methicillin resistant Staphylococcus aureus)      Neuropathy      Non-STEMI (non-ST elevated myocardial infarction) (H)      Occlusion and stenosis of right carotid artery      Osteoarthrosis      Osteomyelitis of ankle and foot (H)      Other atopic dermatitis      PAD (peripheral artery disease) (H)      Peripheral vascular disease (H)      Pneumonia 06/26/2018     Polyneuropathy due to type 2 diabetes mellitus (H)      Pressure ulcer, heel     Bilateral     Renal insufficiency      Type 2 diabetes mellitus, without long-term current use of insulin (H)      Ulcer of right foot (H)       "Unable to function independently 2017            Family History   Problem Relation Age of Onset     Sudden Death Mother 85.00     CABG Father      Valvular heart disease Father      Esophageal Cancer Father 58.00        cause of death     No Known Problems Son      No Known Problems Sister      Obesity Sister      Osteoarthritis Sister      No Known Problems Sister      No Known Problems Grandchild      No Known Problems Grandchild       Social History     Socioeconomic History     Marital status:      Number of children: 1   Tobacco Use     Smoking status: Former     Packs/day: 1.00     Years: 36.00     Pack years: 36.00     Types: Cigarettes     Start date: 1964     Quit date: 2000     Years since quittin.4     Smokeless tobacco: Never   Substance and Sexual Activity     Alcohol use: Not Currently     Alcohol/week: 5.0 standard drinks of alcohol     Types: 1 Cans of beer, 4 Standard drinks or equivalent per week     Drug use: No     Sexual activity: Not Currently     Partners: Female   Social History Narrative    Ever lived with his son Raciel in 2017, but then he went to [facility] after foot amputation.  Ever states he will move to a facility in [place] in . 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  Our reception staff at [facility] in [place] confirmed on 2018 that Ever brought himself to the campus and did not utilize a family member or ride service.  I  would also note that he states his granddaughter is name Claire and not Leonela, so I corrected that in the chart (ROSALIA Moe MD). He lives in [facility] in  and is now not driving as he states \"I got a ticket\".         No new changes to the review of systems or the physical exam since her last visit on .  REVIEW OF SYSTEM:  Currently " denies any new symptoms of fever cough or cold sore throat postnasal drip shortness of breath dyspnea wheezing chest pain dizziness or vertigo nausea vomiting diarrhea dysuria frequency urgency headache or myalgias. History of PVD, diabetes, polyneuropathy, hypertension, CKD stage III     PHYSICAL EXAM:   Pleasant gentleman in no acute distress. Head is normocephalicNeck is supple without adenopathy. Lung sounds are clear throughout. Cardiovascular S1-S2 regular rate and rhythm and no lower extremity edema. Gastrointestinal soft nontender positive bowel sounds. Musculoskeletal strong upper and lower extremities. Nonweightbearing. Needs assistance with ADLs. Pain is managed. Does not have lower extremity pain to palpation. Psychiatric: Pleasant affect.     Current Outpatient Medications:      acetaminophen (TYLENOL) 325 MG tablet, 1000 mg BID scheduled and 650 mg bid prn, Disp: , Rfl:      albuterol (PROAIR HFA/PROVENTIL HFA/VENTOLIN HFA) 108 (90 Base) MCG/ACT inhaler, Inhale 2 puffs into the lungs 2 times daily as needed, Disp: , Rfl:      clopidogrel (PLAVIX) 75 MG tablet, Take 75 mg by mouth daily, Disp: , Rfl:      dulaglutide (TRULICITY) 0.75 MG/0.5ML pen, Inject 1.5 mg Subcutaneous every 7 days, Disp: , Rfl:      furosemide (LASIX) 40 MG tablet, Take 40 mg by mouth daily, Disp: , Rfl:      gabapentin (NEURONTIN) 100 MG capsule, Take 200 mg by mouth 2 times daily, Disp: , Rfl:      ketoconazole (NIZORAL) 2 % external shampoo, Apply topically twice a week Mon and Fri., Disp: , Rfl:      losartan (COZAAR) 25 MG tablet, Take 12.5 mg by mouth daily, Disp: , Rfl:      mineral oil-hydrophilic petrolatum (AQUAPHOR) external ointment, Apply topically 2 times daily, Disp: , Rfl:      omeprazole (PRILOSEC) 20 MG CR capsule, Take 20 mg by mouth daily, Disp: , Rfl:      rivaroxaban ANTICOAGULANT (XARELTO) 15 MG TABS tablet, Take 20 mg by mouth daily, Disp: , Rfl:      simvastatin (ZOCOR) 40 MG tablet, Take 40 mg by mouth At  "Bedtime, Disp: , Rfl:      spironolactone (ALDACTONE) 25 MG tablet, Take 25 mg by mouth daily, Disp: , Rfl:      tamsulosin (FLOMAX) 0.4 MG capsule, Take 0.4 mg by mouth daily, Disp: , Rfl:        /67   Pulse 65   Temp 98.3  F (36.8  C)   Resp 18   Ht 1.778 m (5' 10\")   Wt 85.6 kg (188 lb 12.8 oz)   SpO2 94%   BMI 27.09 kg/m      LABS:   Last Comprehensive Metabolic Panel:  Lab Results   Component Value Date     08/18/2023    POTASSIUM 4.4 08/18/2023    CHLORIDE 104 08/18/2023    CO2 24 08/18/2023    ANIONGAP 12 08/18/2023     (H) 08/18/2023    BUN 21.4 08/18/2023    CR 1.07 08/18/2023    GFRESTIMATED 71 08/18/2023    MELODY 9.4 08/18/2023       CBC RESULTS:   Recent Labs   Lab Test 08/18/23  0828   WBC 8.3   RBC 4.59   HGB 11.4*   HCT 37.0*   MCV 81   MCH 24.8*   MCHC 30.8*   RDW 15.9*              ASSESSMENT:    Encounter Diagnoses   Name Primary?     DM type 2 with diabetic mixed hyperlipidemia (H) Yes     Essential hypertension      PAD (peripheral artery disease) (H)      Chronic venous stasis dermatitis        PLAN:    At this time we will continue to follow-up with infectious disease as well as continuation of the wound care.  No current changes.  Continue to monitor blood sugars blood pressures on his overall nutrition.  He does have a good appetite.  He does not have any increase in discomfort.        Electronically signed by: Wagner Mancuso NP      Sincerely,        Wagner Mancuso NP      "

## 2023-09-27 NOTE — PROGRESS NOTES
[facility] GERIATRIC SERVICES    Facility:   [facility] (U) [[id number]]   Code Status: FULL CODE      CHIEF COMPLAINT/REASON FOR VISIT:  Chief Complaint   Patient presents with    RECHECK       HISTORY:      HPI: Ever is a 78 year old male who I had the pleasure of revisiting with once again today not only secondary to his hospitalization January 17, 2023 secondary to PVD along with diabetic ulcerations and various amputations as well as today's evaluation of his legs, diabetes, blood pressures and nutrition.  His systolic blood pressures have been normotensive.  He has history of diabetes his Accu-Cheks over the past 2 weeks ranging 1  mostly less than 190.  Recently increase the Trulicity 1.5 rather than 0.75.  For pain is on both gabapentin and Tylenol.  His appetite is good.  Does have a good sense of humor.  Regarding his right lower extremity he is getting Vanicream and he does have PVD along with venous stasis dermatitis a few soft areas otherwise no secondary infection.  He also does have a right heel ulcer about 3-4 cm which is nice and clean and there is no discharge.  His left leg also does have venous stasis dermatitis but no ulcerations or infections.  He does need full assistance with all ADLs.  He does have follow-up visits with the infectious disease specialist.    Past Medical History:   Diagnosis Date    A-fib (H)     Acute diastolic heart failure (H) 06/07/2022    Acute posthemorrhagic anemia 05/17/2022    MESHA (acute kidney injury) (H)     Anemia     Atopic keratoconjunctivitis     Atrial fibrillation (H)     Brian Moe: 8/2011 Cardioversion; CHADS2 VASC = 5; he is on warfarin and sotalol     Atrial flutter (H)     Mcgarry's esophagus 01/01/2012    per note of Dr. Tavo Mike of [facility]    Bilateral lower leg cellulitis 08/31/2018    BPH (benign prostatic hyperplasia)     Candidiasis of perineum 01/03/2018    Carotid stenosis     Carotid stenosis, asymptomatic, right     Cellulitis      CHF (congestive heart failure) (H)     Cholecystitis 10/14/2019    Cholecystitis, acute 08/18/2019    Chronic systolic heart failure (H)     Chronic venous stasis dermatitis     Closed nondisplaced intertrochanteric fracture of left femur with routine healing, subsequent encounter 05/18/2022    Clostridium difficile colitis     Contact with and (suspected) exposure to covid-19 12/13/2021    COPD (chronic obstructive pulmonary disease) (H)     Coronary artery disease due to lipid rich plaque 2000    CABG x2    Coronary atherosclerosis     Diabetes (H)     Diabetic ulcer of both feet (H) 10/31/2017    Diabetic ulcer of toe of right foot associated with diabetes mellitus due to underlying condition, limited to breakdown of skin (H) 01/05/2023    Diabetic ulcer of toe of right foot associated with diabetes mellitus due to underlying condition, with necrosis of bone (H) 01/05/2023    Dyslexia     Dyslipidemia, goal LDL below 70 2000    Epistaxis     Essential hypertension     Gangrene of left foot (H)     GERD (gastroesophageal reflux disease)     HLD (hyperlipidemia)     HTN (hypertension)     Hyponatremia     Ischemic heart disease     Lymphedema     Mild cognitive impairment     MRSA (methicillin resistant Staphylococcus aureus)     Neuropathy     Non-STEMI (non-ST elevated myocardial infarction) (H)     Occlusion and stenosis of right carotid artery     Osteoarthrosis     Osteomyelitis of ankle and foot (H)     Other atopic dermatitis     PAD (peripheral artery disease) (H)     Peripheral vascular disease (H)     Pneumonia 06/26/2018    Polyneuropathy due to type 2 diabetes mellitus (H)     Pressure ulcer, heel     Bilateral    Renal insufficiency     Type 2 diabetes mellitus, without long-term current use of insulin (H)     Ulcer of right foot (H)     Unable to function independently 11/13/2017            Family History   Problem Relation Age of Onset    Sudden Death Mother 85.00    CABG Father     Valvular heart  "disease Father     Esophageal Cancer Father 58.00        cause of death    No Known Problems Son     No Known Problems Sister     Obesity Sister     Osteoarthritis Sister     No Known Problems Sister     No Known Problems Grandchild     No Known Problems Grandchild       Social History     Socioeconomic History    Marital status:     Number of children: 1   Tobacco Use    Smoking status: Former     Packs/day: 1.00     Years: 36.00     Pack years: 36.00     Types: Cigarettes     Start date: 1964     Quit date: 2000     Years since quittin.4    Smokeless tobacco: Never   Substance and Sexual Activity    Alcohol use: Not Currently     Alcohol/week: 5.0 standard drinks of alcohol     Types: 1 Cans of beer, 4 Standard drinks or equivalent per week    Drug use: No    Sexual activity: Not Currently     Partners: Female   Social History Narrative    Ever lived with his son Raciel in 2017, but then he went to [facility] after foot amputation.  Ever states he will move to a facility in [place] in . 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  Our reception staff at [facility] in [place] confirmed on 2018 that Ever brought himself to the campus and did not utilize a family member or ride service.  I  would also note that he states his granddaughter is name Claire and not Leonela, so I corrected that in the chart (ROSALIA Moe MD). He lives in [facility] in  and is now not driving as he states \"I got a ticket\".         No new changes to the review of systems or the physical exam since her last visit on .  REVIEW OF SYSTEM:  Currently denies any new symptoms of fever cough or cold sore throat postnasal drip shortness of breath dyspnea wheezing chest pain dizziness or vertigo nausea vomiting diarrhea dysuria frequency " urgency headache or myalgias. History of PVD, diabetes, polyneuropathy, hypertension, CKD stage III     PHYSICAL EXAM:   Pleasant gentleman in no acute distress. Head is normocephalicNeck is supple without adenopathy. Lung sounds are clear throughout. Cardiovascular S1-S2 regular rate and rhythm and no lower extremity edema. Gastrointestinal soft nontender positive bowel sounds. Musculoskeletal strong upper and lower extremities. Nonweightbearing. Needs assistance with ADLs. Pain is managed. Does not have lower extremity pain to palpation. Psychiatric: Pleasant affect.     Current Outpatient Medications:     acetaminophen (TYLENOL) 325 MG tablet, 1000 mg BID scheduled and 650 mg bid prn, Disp: , Rfl:     albuterol (PROAIR HFA/PROVENTIL HFA/VENTOLIN HFA) 108 (90 Base) MCG/ACT inhaler, Inhale 2 puffs into the lungs 2 times daily as needed, Disp: , Rfl:     clopidogrel (PLAVIX) 75 MG tablet, Take 75 mg by mouth daily, Disp: , Rfl:     dulaglutide (TRULICITY) 0.75 MG/0.5ML pen, Inject 1.5 mg Subcutaneous every 7 days, Disp: , Rfl:     furosemide (LASIX) 40 MG tablet, Take 40 mg by mouth daily, Disp: , Rfl:     gabapentin (NEURONTIN) 100 MG capsule, Take 200 mg by mouth 2 times daily, Disp: , Rfl:     ketoconazole (NIZORAL) 2 % external shampoo, Apply topically twice a week Mon and Fri., Disp: , Rfl:     losartan (COZAAR) 25 MG tablet, Take 12.5 mg by mouth daily, Disp: , Rfl:     mineral oil-hydrophilic petrolatum (AQUAPHOR) external ointment, Apply topically 2 times daily, Disp: , Rfl:     omeprazole (PRILOSEC) 20 MG CR capsule, Take 20 mg by mouth daily, Disp: , Rfl:     rivaroxaban ANTICOAGULANT (XARELTO) 15 MG TABS tablet, Take 20 mg by mouth daily, Disp: , Rfl:     simvastatin (ZOCOR) 40 MG tablet, Take 40 mg by mouth At Bedtime, Disp: , Rfl:     spironolactone (ALDACTONE) 25 MG tablet, Take 25 mg by mouth daily, Disp: , Rfl:     tamsulosin (FLOMAX) 0.4 MG capsule, Take 0.4 mg by mouth daily, Disp: , Rfl:        BP  "116/67   Pulse 65   Temp 98.3  F (36.8  C)   Resp 18   Ht 1.778 m (5' 10\")   Wt 85.6 kg (188 lb 12.8 oz)   SpO2 94%   BMI 27.09 kg/m      LABS:   Last Comprehensive Metabolic Panel:  Lab Results   Component Value Date     08/18/2023    POTASSIUM 4.4 08/18/2023    CHLORIDE 104 08/18/2023    CO2 24 08/18/2023    ANIONGAP 12 08/18/2023     (H) 08/18/2023    BUN 21.4 08/18/2023    CR 1.07 08/18/2023    GFRESTIMATED 71 08/18/2023    MELODY 9.4 08/18/2023       CBC RESULTS:   Recent Labs   Lab Test 08/18/23  0828   WBC 8.3   RBC 4.59   HGB 11.4*   HCT 37.0*   MCV 81   MCH 24.8*   MCHC 30.8*   RDW 15.9*              ASSESSMENT:    Encounter Diagnoses   Name Primary?    DM type 2 with diabetic mixed hyperlipidemia (H) Yes    Essential hypertension     PAD (peripheral artery disease) (H)     Chronic venous stasis dermatitis        PLAN:    At this time we will continue to follow-up with infectious disease as well as continuation of the wound care.  No current changes.  Continue to monitor blood sugars blood pressures on his overall nutrition.  He does have a good appetite.  He does not have any increase in discomfort.        Electronically signed by: Wagner Mancuso NP    "

## 2023-09-29 NOTE — TELEPHONE ENCOUNTER
Spoke to sister and informed of below. Pt has appt with  on 10/5. Will see if Regan's nurse will do. If not will go over during that appt and do swab.

## 2023-10-01 VITALS
TEMPERATURE: 97.7 F | DIASTOLIC BLOOD PRESSURE: 60 MMHG | HEART RATE: 56 BPM | OXYGEN SATURATION: 97 % | RESPIRATION RATE: 16 BRPM | BODY MASS INDEX: 28.88 KG/M2 | WEIGHT: 201.7 LBS | SYSTOLIC BLOOD PRESSURE: 122 MMHG | HEIGHT: 70 IN

## 2023-10-02 ENCOUNTER — TRANSITIONAL CARE UNIT VISIT (OUTPATIENT)
Dept: GERIATRICS | Facility: CLINIC | Age: 78
End: 2023-10-02
Payer: MEDICARE

## 2023-10-02 DIAGNOSIS — Z79.4 TYPE 2 DIABETES MELLITUS WITH OTHER CIRCULATORY COMPLICATION, WITH LONG-TERM CURRENT USE OF INSULIN (H): Chronic | ICD-10-CM

## 2023-10-02 DIAGNOSIS — I87.2 CHRONIC VENOUS STASIS DERMATITIS: ICD-10-CM

## 2023-10-02 DIAGNOSIS — E11.59 TYPE 2 DIABETES MELLITUS WITH OTHER CIRCULATORY COMPLICATION, WITH LONG-TERM CURRENT USE OF INSULIN (H): Chronic | ICD-10-CM

## 2023-10-02 DIAGNOSIS — I10 ESSENTIAL HYPERTENSION: Primary | Chronic | ICD-10-CM

## 2023-10-02 DIAGNOSIS — I73.9 PAD (PERIPHERAL ARTERY DISEASE) (H): Chronic | ICD-10-CM

## 2023-10-02 PROCEDURE — 99309 SBSQ NF CARE MODERATE MDM 30: CPT | Performed by: FAMILY MEDICINE

## 2023-10-02 RX ORDER — GLIMEPIRIDE 1 MG/1
1 TABLET ORAL
COMMUNITY

## 2023-10-02 NOTE — LETTER
10/2/2023        RE: Ever Crane  [address]  [address]  [facility] [address]        [facility]    Facility:   [facility] ([facility]) [[id number]]   Code Status: FULL CODE      CHIEF COMPLAINT/REASON FOR VISIT:  Chief Complaint   Patient presents with     RECHECK       HISTORY:      HPI: Ever is a 78 year old male who does remain in the transitional care unit room 170 and who I had the opportunity to revisit with once again today not only secondary to his hospitalization January 17, 2023 secondary to PVD along with diabetic ulcerations and various amputations as well as today's discussion of his legs, diabetes, blood pressures, nutrition and therapy.  He states he is overall doing well.  His systolic blood pressures ranging 110-146 he has been afebrile and also on room air.  His weight on October 1 initially was 201 with a repeat weight 183 on the same day in comparison to September 20 that 185 so I believe the 183 was more accurate than the 201 pounds.  Her blood sugars she does range 177-282 he is on Trulicity 1.51 now and Amaryl 1 mg daily.  For diuretics he is on furosemide and spironolactone and for pain is on scheduled Tylenol 1000 mg twice daily and gabapentin 200 mg twice daily.  His legs are wrapped he does have a number of wounds and venous stasis issues and he does have a visit with podiatry this week.  Otherwise is to be nonweightbearing.  His appetite is good.  Does need assistance with ADLs.    Past Medical History:   Diagnosis Date     A-fib (H)      Acute diastolic heart failure (H) 06/07/2022     Acute posthemorrhagic anemia 05/17/2022     MESHA (acute kidney injury) (H)      Anemia      Atopic keratoconjunctivitis      Atrial fibrillation (H)     Brian Moe: 8/2011 Cardioversion; CHADS2 VASC = 5; he is on warfarin and sotalol      Atrial flutter (H)      Mcgarry's esophagus 01/01/2012    per note of Dr. Tavo Mike of [facility]     Bilateral lower leg cellulitis 08/31/2018      BPH (benign prostatic hyperplasia)      Candidiasis of perineum 01/03/2018     Carotid stenosis      Carotid stenosis, asymptomatic, right      Cellulitis      CHF (congestive heart failure) (H)      Cholecystitis 10/14/2019     Cholecystitis, acute 08/18/2019     Chronic systolic heart failure (H)      Chronic venous stasis dermatitis      Closed nondisplaced intertrochanteric fracture of left femur with routine healing, subsequent encounter 05/18/2022     Clostridium difficile colitis      Contact with and (suspected) exposure to covid-19 12/13/2021     COPD (chronic obstructive pulmonary disease) (H)      Coronary artery disease due to lipid rich plaque 2000    CABG x2     Coronary atherosclerosis      Diabetes (H)      Diabetic ulcer of both feet (H) 10/31/2017     Diabetic ulcer of toe of right foot associated with diabetes mellitus due to underlying condition, limited to breakdown of skin (H) 01/05/2023     Diabetic ulcer of toe of right foot associated with diabetes mellitus due to underlying condition, with necrosis of bone (H) 01/05/2023     Dyslexia      Dyslipidemia, goal LDL below 70 2000     Epistaxis      Essential hypertension      Gangrene of left foot (H)      GERD (gastroesophageal reflux disease)      HLD (hyperlipidemia)      HTN (hypertension)      Hyponatremia      Ischemic heart disease      Lymphedema      Mild cognitive impairment      MRSA (methicillin resistant Staphylococcus aureus)      Neuropathy      Non-STEMI (non-ST elevated myocardial infarction) (H)      Occlusion and stenosis of right carotid artery      Osteoarthrosis      Osteomyelitis of ankle and foot (H)      Other atopic dermatitis      PAD (peripheral artery disease) (H)      Peripheral vascular disease (H)      Pneumonia 06/26/2018     Polyneuropathy due to type 2 diabetes mellitus (H)      Pressure ulcer, heel     Bilateral     Renal insufficiency      Type 2 diabetes mellitus, without long-term current use of insulin (H)  "     Ulcer of right foot (H)      Unable to function independently 2017            Family History   Problem Relation Age of Onset     Sudden Death Mother 85.00     CABG Father      Valvular heart disease Father      Esophageal Cancer Father 58.00        cause of death     No Known Problems Son      No Known Problems Sister      Obesity Sister      Osteoarthritis Sister      No Known Problems Sister      No Known Problems Grandchild      No Known Problems Grandchild       Social History     Socioeconomic History     Marital status:      Number of children: 1   Tobacco Use     Smoking status: Former     Packs/day: 1.00     Years: 36.00     Pack years: 36.00     Types: Cigarettes     Start date: 1964     Quit date: 2000     Years since quittin.4     Smokeless tobacco: Never   Substance and Sexual Activity     Alcohol use: Not Currently     Alcohol/week: 5.0 standard drinks of alcohol     Types: 1 Cans of beer, 4 Standard drinks or equivalent per week     Drug use: No     Sexual activity: Not Currently     Partners: Female   Social History Narrative    Ever lived with his son Raciel in 2017, but then he went to [facility] after foot amputation.  Ever states he will move to a facility in [place] in . 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  Our reception staff at [facility] in [place] confirmed on 2018 that Ever brought himself to the campus and did not utilize a family member or ride service.  I  would also note that he states his granddaughter is name Claire and not Leonela, so I corrected that in the chart (ROSALIA Meo MD). He lives in [facility] in  and is now not driving as he states \"I got a ticket\".         No new changes to the review of systems or the physical exam since her last visit on September " 27  REVIEW OF SYSTEM:  Currently denies any new symptoms of fever cough or cold sore throat postnasal drip shortness of breath dyspnea wheezing chest pain dizziness or vertigo nausea vomiting diarrhea dysuria frequency urgency headache or myalgias. History of PVD, diabetes, polyneuropathy, hypertension, CKD stage III      PHYSICAL EXAM:   Pleasant gentleman in no acute distress. Head is normocephalicNeck is supple without adenopathy. Lung sounds are clear throughout. Cardiovascular S1-S2 regular rate and rhythm and no lower extremity edema. Gastrointestinal soft nontender positive bowel sounds. Musculoskeletal - Nonweightbearing. Needs assistance with ADLs. Pain is managed. Legs are wrapped.Psychiatric: Pleasant affect.         Current Outpatient Medications:      glimepiride (AMARYL) 1 MG tablet, Take 1 mg by mouth every morning (before breakfast), Disp: , Rfl:      acetaminophen (TYLENOL) 325 MG tablet, 1000 mg BID scheduled and 650 mg bid prn, Disp: , Rfl:      albuterol (PROAIR HFA/PROVENTIL HFA/VENTOLIN HFA) 108 (90 Base) MCG/ACT inhaler, Inhale 2 puffs into the lungs 2 times daily as needed, Disp: , Rfl:      clopidogrel (PLAVIX) 75 MG tablet, Take 75 mg by mouth daily, Disp: , Rfl:      dulaglutide (TRULICITY) 0.75 MG/0.5ML pen, Inject 1.5 mg Subcutaneous every 7 days, Disp: , Rfl:      furosemide (LASIX) 40 MG tablet, Take 40 mg by mouth daily, Disp: , Rfl:      gabapentin (NEURONTIN) 100 MG capsule, Take 200 mg by mouth 2 times daily, Disp: , Rfl:      ketoconazole (NIZORAL) 2 % external shampoo, Apply topically twice a week Mon and Fri., Disp: , Rfl:      losartan (COZAAR) 25 MG tablet, Take 12.5 mg by mouth daily, Disp: , Rfl:      mineral oil-hydrophilic petrolatum (AQUAPHOR) external ointment, Apply topically 2 times daily, Disp: , Rfl:      omeprazole (PRILOSEC) 20 MG CR capsule, Take 20 mg by mouth daily, Disp: , Rfl:      rivaroxaban ANTICOAGULANT (XARELTO) 15 MG TABS tablet, Take 20 mg by mouth  "daily, Disp: , Rfl:      simvastatin (ZOCOR) 40 MG tablet, Take 40 mg by mouth At Bedtime, Disp: , Rfl:      spironolactone (ALDACTONE) 25 MG tablet, Take 25 mg by mouth daily, Disp: , Rfl:      tamsulosin (FLOMAX) 0.4 MG capsule, Take 0.4 mg by mouth daily, Disp: , Rfl:     /60   Pulse 56   Temp 97.7  F (36.5  C)   Resp 16   Ht 1.778 m (5' 10\")   Wt 91.5 kg (201 lb 11.2 oz)   SpO2 97%   BMI 28.94 kg/m      LABS:   CBC RESULTS:   Recent Labs   Lab Test 08/18/23  0828   WBC 8.3   RBC 4.59   HGB 11.4*   HCT 37.0*   MCV 81   MCH 24.8*   MCHC 30.8*   RDW 15.9*        Last Comprehensive Metabolic Panel:  Lab Results   Component Value Date     08/18/2023    POTASSIUM 4.4 08/18/2023    CHLORIDE 104 08/18/2023    CO2 24 08/18/2023    ANIONGAP 12 08/18/2023     (H) 08/18/2023    BUN 21.4 08/18/2023    CR 1.07 08/18/2023    GFRESTIMATED 71 08/18/2023    MELODY 9.4 08/18/2023             ASSESSMENT:    Encounter Diagnoses   Name Primary?     Essential hypertension Yes     PAD (peripheral artery disease) (H24)      Chronic venous stasis dermatitis      Type 2 diabetes mellitus with other circulatory complication, with long-term current use of insulin (H)        PLAN:    Adding Amaryl 1 mg daily to his current Trulicity dose of 1.5.  He does have a visit with podiatry this week.  Legs are currently wrapped.  Blood pressures are stable.  Otherwise he states he is feeling good.  Continue to follow good nutrition and rehabilitation guidelines        Electronically signed by: Wagner Mancuso NP      Sincerely,        Wagner Mancuso NP      "

## 2023-10-02 NOTE — PROGRESS NOTES
[facility] GERIATRIC SERVICES    Facility:   [facility] ([facility]) [[id number]]   Code Status: FULL CODE      CHIEF COMPLAINT/REASON FOR VISIT:  Chief Complaint   Patient presents with    RECHECK       HISTORY:      HPI: Ever is a 78 year old male who does remain in the transitional care unit room 170 and who I had the opportunity to revisit with once again today not only secondary to his hospitalization January 17, 2023 secondary to PVD along with diabetic ulcerations and various amputations as well as today's discussion of his legs, diabetes, blood pressures, nutrition and therapy.  He states he is overall doing well.  His systolic blood pressures ranging 110-146 he has been afebrile and also on room air.  His weight on October 1 initially was 201 with a repeat weight 183 on the same day in comparison to September 20 that 185 so I believe the 183 was more accurate than the 201 pounds.  Her blood sugars she does range 177-282 he is on Trulicity 1.51 now and Amaryl 1 mg daily.  For diuretics he is on furosemide and spironolactone and for pain is on scheduled Tylenol 1000 mg twice daily and gabapentin 200 mg twice daily.  His legs are wrapped he does have a number of wounds and venous stasis issues and he does have a visit with podiatry this week.  Otherwise is to be nonweightbearing.  His appetite is good.  Does need assistance with ADLs.    Past Medical History:   Diagnosis Date    A-fib (H)     Acute diastolic heart failure (H) 06/07/2022    Acute posthemorrhagic anemia 05/17/2022    MESHA (acute kidney injury) (H)     Anemia     Atopic keratoconjunctivitis     Atrial fibrillation (H)     Brian Moe: 8/2011 Cardioversion; CHADS2 VASC = 5; he is on warfarin and sotalol     Atrial flutter (H)     Mcgarry's esophagus 01/01/2012    per note of Dr. Tavo Mike of [facility]    Bilateral lower leg cellulitis 08/31/2018    BPH (benign prostatic hyperplasia)     Candidiasis of perineum 01/03/2018    Carotid stenosis     Carotid  stenosis, asymptomatic, right     Cellulitis     CHF (congestive heart failure) (H)     Cholecystitis 10/14/2019    Cholecystitis, acute 08/18/2019    Chronic systolic heart failure (H)     Chronic venous stasis dermatitis     Closed nondisplaced intertrochanteric fracture of left femur with routine healing, subsequent encounter 05/18/2022    Clostridium difficile colitis     Contact with and (suspected) exposure to covid-19 12/13/2021    COPD (chronic obstructive pulmonary disease) (H)     Coronary artery disease due to lipid rich plaque 2000    CABG x2    Coronary atherosclerosis     Diabetes (H)     Diabetic ulcer of both feet (H) 10/31/2017    Diabetic ulcer of toe of right foot associated with diabetes mellitus due to underlying condition, limited to breakdown of skin (H) 01/05/2023    Diabetic ulcer of toe of right foot associated with diabetes mellitus due to underlying condition, with necrosis of bone (H) 01/05/2023    Dyslexia     Dyslipidemia, goal LDL below 70 2000    Epistaxis     Essential hypertension     Gangrene of left foot (H)     GERD (gastroesophageal reflux disease)     HLD (hyperlipidemia)     HTN (hypertension)     Hyponatremia     Ischemic heart disease     Lymphedema     Mild cognitive impairment     MRSA (methicillin resistant Staphylococcus aureus)     Neuropathy     Non-STEMI (non-ST elevated myocardial infarction) (H)     Occlusion and stenosis of right carotid artery     Osteoarthrosis     Osteomyelitis of ankle and foot (H)     Other atopic dermatitis     PAD (peripheral artery disease) (H)     Peripheral vascular disease (H)     Pneumonia 06/26/2018    Polyneuropathy due to type 2 diabetes mellitus (H)     Pressure ulcer, heel     Bilateral    Renal insufficiency     Type 2 diabetes mellitus, without long-term current use of insulin (H)     Ulcer of right foot (H)     Unable to function independently 11/13/2017            Family History   Problem Relation Age of Onset    Sudden Death  "Mother 85.00    CABG Father     Valvular heart disease Father     Esophageal Cancer Father 58.00        cause of death    No Known Problems Son     No Known Problems Sister     Obesity Sister     Osteoarthritis Sister     No Known Problems Sister     No Known Problems Grandchild     No Known Problems Grandchild       Social History     Socioeconomic History    Marital status:     Number of children: 1   Tobacco Use    Smoking status: Former     Packs/day: 1.00     Years: 36.00     Pack years: 36.00     Types: Cigarettes     Start date: 1964     Quit date: 2000     Years since quittin.4    Smokeless tobacco: Never   Substance and Sexual Activity    Alcohol use: Not Currently     Alcohol/week: 5.0 standard drinks of alcohol     Types: 1 Cans of beer, 4 Standard drinks or equivalent per week    Drug use: No    Sexual activity: Not Currently     Partners: Female   Social History Narrative    Ever lived with his son Raciel in 2017, but then he went to [facility] after foot amputation.  Ever states he will move to a facility in [place] in . 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  Our reception staff at [facility] in [place] confirmed on 2018 that Ever brought himself to the campus and did not utilize a family member or ride service.  I  would also note that he states his granddaughter is name Claire and not Leonela, so I corrected that in the chart (ROSALIA Moe MD). He lives in [facility] in  and is now not driving as he states \"I got a ticket\".         No new changes to the review of systems or the physical exam since her last visit on   REVIEW OF SYSTEM:  Currently denies any new symptoms of fever cough or cold sore throat postnasal drip shortness of breath dyspnea wheezing chest pain dizziness or " vertigo nausea vomiting diarrhea dysuria frequency urgency headache or myalgias. History of PVD, diabetes, polyneuropathy, hypertension, CKD stage III      PHYSICAL EXAM:   Pleasant gentleman in no acute distress. Head is normocephalicNeck is supple without adenopathy. Lung sounds are clear throughout. Cardiovascular S1-S2 regular rate and rhythm and no lower extremity edema. Gastrointestinal soft nontender positive bowel sounds. Musculoskeletal - Nonweightbearing. Needs assistance with ADLs. Pain is managed. Legs are wrapped.Psychiatric: Pleasant affect.         Current Outpatient Medications:     glimepiride (AMARYL) 1 MG tablet, Take 1 mg by mouth every morning (before breakfast), Disp: , Rfl:     acetaminophen (TYLENOL) 325 MG tablet, 1000 mg BID scheduled and 650 mg bid prn, Disp: , Rfl:     albuterol (PROAIR HFA/PROVENTIL HFA/VENTOLIN HFA) 108 (90 Base) MCG/ACT inhaler, Inhale 2 puffs into the lungs 2 times daily as needed, Disp: , Rfl:     clopidogrel (PLAVIX) 75 MG tablet, Take 75 mg by mouth daily, Disp: , Rfl:     dulaglutide (TRULICITY) 0.75 MG/0.5ML pen, Inject 1.5 mg Subcutaneous every 7 days, Disp: , Rfl:     furosemide (LASIX) 40 MG tablet, Take 40 mg by mouth daily, Disp: , Rfl:     gabapentin (NEURONTIN) 100 MG capsule, Take 200 mg by mouth 2 times daily, Disp: , Rfl:     ketoconazole (NIZORAL) 2 % external shampoo, Apply topically twice a week Mon and Fri., Disp: , Rfl:     losartan (COZAAR) 25 MG tablet, Take 12.5 mg by mouth daily, Disp: , Rfl:     mineral oil-hydrophilic petrolatum (AQUAPHOR) external ointment, Apply topically 2 times daily, Disp: , Rfl:     omeprazole (PRILOSEC) 20 MG CR capsule, Take 20 mg by mouth daily, Disp: , Rfl:     rivaroxaban ANTICOAGULANT (XARELTO) 15 MG TABS tablet, Take 20 mg by mouth daily, Disp: , Rfl:     simvastatin (ZOCOR) 40 MG tablet, Take 40 mg by mouth At Bedtime, Disp: , Rfl:     spironolactone (ALDACTONE) 25 MG tablet, Take 25 mg by mouth daily, Disp: ,  "Rfl:     tamsulosin (FLOMAX) 0.4 MG capsule, Take 0.4 mg by mouth daily, Disp: , Rfl:     /60   Pulse 56   Temp 97.7  F (36.5  C)   Resp 16   Ht 1.778 m (5' 10\")   Wt 91.5 kg (201 lb 11.2 oz)   SpO2 97%   BMI 28.94 kg/m      LABS:   CBC RESULTS:   Recent Labs   Lab Test 08/18/23  0828   WBC 8.3   RBC 4.59   HGB 11.4*   HCT 37.0*   MCV 81   MCH 24.8*   MCHC 30.8*   RDW 15.9*        Last Comprehensive Metabolic Panel:  Lab Results   Component Value Date     08/18/2023    POTASSIUM 4.4 08/18/2023    CHLORIDE 104 08/18/2023    CO2 24 08/18/2023    ANIONGAP 12 08/18/2023     (H) 08/18/2023    BUN 21.4 08/18/2023    CR 1.07 08/18/2023    GFRESTIMATED 71 08/18/2023    MELODY 9.4 08/18/2023             ASSESSMENT:    Encounter Diagnoses   Name Primary?    Essential hypertension Yes    PAD (peripheral artery disease) (H24)     Chronic venous stasis dermatitis     Type 2 diabetes mellitus with other circulatory complication, with long-term current use of insulin (H)        PLAN:    Adding Amaryl 1 mg daily to his current Trulicity dose of 1.5.  He does have a visit with podiatry this week.  Legs are currently wrapped.  Blood pressures are stable.  Otherwise he states he is feeling good.  Continue to follow good nutrition and rehabilitation guidelines        Electronically signed by: Wagner Mancuso NP    "

## 2023-10-03 NOTE — TELEPHONE ENCOUNTER
[facility]    Phone Message    May a detailed message be left on voicemail: yes     Reason for Call: Other: rescheduled appt for 10/11/2023 instead of 10/5/2023. Patient ride didn't work out. Can patient get a 2nd Mers[facility] test that day instead?     Action Taken: Message routed to:  [facility] ([facility]    Travel Screening: Not Applicable

## 2023-10-10 VITALS
OXYGEN SATURATION: 93 % | WEIGHT: 180 LBS | HEIGHT: 70 IN | TEMPERATURE: 97.5 F | RESPIRATION RATE: 16 BRPM | DIASTOLIC BLOOD PRESSURE: 63 MMHG | BODY MASS INDEX: 25.77 KG/M2 | HEART RATE: 68 BPM | SYSTOLIC BLOOD PRESSURE: 123 MMHG

## 2023-10-10 NOTE — PROGRESS NOTES
"[facility] GERIATRICS    Chief Complaint   Patient presents with    RECHECK     HPI:  Ever Crane is a 78 year old  (1945), who is being seen today for an episodic care visit at: [facility] (TCU) [[id number]].     This is a 79 yo male with PMHx for PAD, Htn, DM, VSUs who was hospitalized January 17, 2023 secondary to PVD along with diabetic ulcerations and partial foot amputations. Discharged to [facility] for rehab.    Today's concern is:   DM, eye infection, pain, VSUs    Allergies, and PMH/PSH reviewed in EPIC today.  REVIEW OF SYSTEMS:  4 point ROS including Respiratory, CV, GI and , other than that noted in the HPI,  is negative    Objective:   /63   Pulse 68   Temp 97.5  F (36.4  C)   Resp 16   Ht 1.778 m (5' 10\")   Wt 81.6 kg (180 lb)   SpO2 93%   BMI 25.83 kg/m    GENERAL APPEARANCE:  Alert, in no distress, oriented, thin, cooperative, denies pain  RESP:  no respiratory distress, diminished breath sounds bilaterally, RA  CV:  IRRR, no murmur, rub, or gallop, no edema  SKIN:  Significant bilateral lower extremity slough, intact  PSYCH:  oriented X 3    Most Recent 3 CBC's:  Recent Labs   Lab Test 08/18/23  0828 08/11/23  0823 08/04/23  0759   WBC 8.3 7.7 6.6   HGB 11.4* 11.5* 10.7*   MCV 81 80 79    208 201     Most Recent 3 BMP's:  Recent Labs   Lab Test 08/18/23  0828 08/11/23  0823 08/04/23  0759    138 140   POTASSIUM 4.4 4.7 4.4   CHLORIDE 104 102 104   CO2 24 24 22   BUN 21.4 25.0* 26.2*   CR 1.07 1.10 1.18*   ANIONGAP 12 12 14   MELODY 9.4 9.9 9.7   * 151* 120*     Most Recent Hemoglobin A1c:  Recent Labs   Lab Test 05/24/23  1250   A1C 6.6*       Assessment/Plan:    Eye infection, left  Order to clean eye daily, start erythromycin gel 4 times daily x1 week    (L97.514) Ulcer of right foot, with necrosis of bone (H)  Bilateral partial foot amputations  (I73.9) Peripheral vascular disease (H24)  (I87.2) Chronic venous stasis dermatitis  Continue " wound care as ordered, follow-up with vascular on 10/18.  Continue Plavix and statin    (E11.59,  Z79.4) Type 2 diabetes mellitus with other circulatory complication, with long-term current use of insulin (H)  Continue Amaryl 1 mg daily with Trulicity 1.5 mg every Tuesday.  Recheck A1c on 10/16    (E11.42) Polyneuropathy due to type 2 diabetes mellitus (H)    Continue Tylenol 1000mg BID and 650 mg twice daily as needed and gabapentin 200 mg twice daily    (I10) Essential hypertension  (I25.5) Ischemic cardiomyopathy  Continue losartan 12.5 mg daily and spironolactone 25 mg daily with Lasix 40 mg daily.  SBP 120s-130s, HR <70s    (I48.19) Persistent atrial fibrillation (H)  Continue rivaroxaban 20 mg daily    (N18.32) Stage 3b chronic kidney disease (H)  Last CR 1.07 with GFR >70, recheck BMP on 10/16    Normocytic anemia  Last Hgb 11.4, recheck CBC on 10/16    BPH  Continue tamsulosin 0.4 mg daily    Orders:  Erythromycin gel, recheck A1c/BMP/CBC    Electronically signed by: Wagner Monterroso NP

## 2023-10-11 ENCOUNTER — TRANSITIONAL CARE UNIT VISIT (OUTPATIENT)
Dept: GERIATRICS | Facility: CLINIC | Age: 78
End: 2023-10-11
Payer: MEDICARE

## 2023-10-11 DIAGNOSIS — Z79.4 TYPE 2 DIABETES MELLITUS WITH OTHER CIRCULATORY COMPLICATION, WITH LONG-TERM CURRENT USE OF INSULIN (H): Chronic | ICD-10-CM

## 2023-10-11 DIAGNOSIS — I73.9 PERIPHERAL VASCULAR DISEASE (H): Chronic | ICD-10-CM

## 2023-10-11 DIAGNOSIS — I48.19 PERSISTENT ATRIAL FIBRILLATION (H): Chronic | ICD-10-CM

## 2023-10-11 DIAGNOSIS — L97.514 ULCER OF RIGHT FOOT, WITH NECROSIS OF BONE (H): ICD-10-CM

## 2023-10-11 DIAGNOSIS — N18.32 STAGE 3B CHRONIC KIDNEY DISEASE (H): Chronic | ICD-10-CM

## 2023-10-11 DIAGNOSIS — I87.2 CHRONIC VENOUS STASIS DERMATITIS: ICD-10-CM

## 2023-10-11 DIAGNOSIS — I25.5 ISCHEMIC CARDIOMYOPATHY: ICD-10-CM

## 2023-10-11 DIAGNOSIS — I10 ESSENTIAL HYPERTENSION: Chronic | ICD-10-CM

## 2023-10-11 DIAGNOSIS — E11.42 POLYNEUROPATHY DUE TO TYPE 2 DIABETES MELLITUS (H): Primary | Chronic | ICD-10-CM

## 2023-10-11 DIAGNOSIS — Z89.432 STATUS POST AMPUTATION OF LEFT FOOT (H): Chronic | ICD-10-CM

## 2023-10-11 DIAGNOSIS — E11.59 TYPE 2 DIABETES MELLITUS WITH OTHER CIRCULATORY COMPLICATION, WITH LONG-TERM CURRENT USE OF INSULIN (H): Chronic | ICD-10-CM

## 2023-10-11 PROCEDURE — 99309 SBSQ NF CARE MODERATE MDM 30: CPT | Performed by: NURSE PRACTITIONER

## 2023-10-11 NOTE — LETTER
"    10/11/2023        RE: Ever Crane  [address]  [address]  [place] [address]        [facility] GERIATRICS    Chief Complaint   Patient presents with     RECHECK     HPI:  Ever Crane is a 78 year old  (1945), who is being seen today for an episodic care visit at: [facility] (TCU) [[id number]].     This is a 77 yo male with PMHx for PAD, Htn, DM, VSUs who was hospitalized January 17, 2023 secondary to PVD along with diabetic ulcerations and partial foot amputations. Discharged to [facility]CU for rehab.    Today's concern is:   DM, eye infection, pain, VSUs    Allergies, and PMH/PSH reviewed in [facility] today.  REVIEW OF SYSTEMS:  4 point ROS including Respiratory, CV, GI and , other than that noted in the HPI,  is negative    Objective:   /63   Pulse 68   Temp 97.5  F (36.4  C)   Resp 16   Ht 1.778 m (5' 10\")   Wt 81.6 kg (180 lb)   SpO2 93%   BMI 25.83 kg/m    GENERAL APPEARANCE:  Alert, in no distress, oriented, thin, cooperative, denies pain  RESP:  no respiratory distress, diminished breath sounds bilaterally, RA  CV:  IRRR, no murmur, rub, or gallop, no edema  SKIN:  Significant bilateral lower extremity slough, intact  PSYCH:  oriented X 3    Most Recent 3 CBC's:  Recent Labs   Lab Test 08/18/23  0828 08/11/23  0823 08/04/23  0759   WBC 8.3 7.7 6.6   HGB 11.4* 11.5* 10.7*   MCV 81 80 79    208 201     Most Recent 3 BMP's:  Recent Labs   Lab Test 08/18/23  0828 08/11/23  0823 08/04/23  0759    138 140   POTASSIUM 4.4 4.7 4.4   CHLORIDE 104 102 104   CO2 24 24 22   BUN 21.4 25.0* 26.2*   CR 1.07 1.10 1.18*   ANIONGAP 12 12 14   MELODY 9.4 9.9 9.7   * 151* 120*     Most Recent Hemoglobin A1c:  Recent Labs   Lab Test 05/24/23  1250   A1C 6.6*       Assessment/Plan:    Eye infection, left  Order to clean eye daily, start erythromycin gel 4 times daily x1 week    (L97.514) Ulcer of right foot, with necrosis of bone (H)  Bilateral partial foot " amputations  (I73.9) Peripheral vascular disease (H24)  (I87.2) Chronic venous stasis dermatitis  Continue wound care as ordered, follow-up with vascular on 10/18.  Continue Plavix and statin    (E11.59,  Z79.4) Type 2 diabetes mellitus with other circulatory complication, with long-term current use of insulin (H)  Continue Amaryl 1 mg daily with Trulicity 1.5 mg every Tuesday.  Recheck A1c on 10/16    (E11.42) Polyneuropathy due to type 2 diabetes mellitus (H)    Continue Tylenol 1000mg BID and 650 mg twice daily as needed and gabapentin 200 mg twice daily    (I10) Essential hypertension  (I25.5) Ischemic cardiomyopathy  Continue losartan 12.5 mg daily and spironolactone 25 mg daily with Lasix 40 mg daily.  SBP 120s-130s, HR <70s    (I48.19) Persistent atrial fibrillation (H)  Continue rivaroxaban 20 mg daily    (N18.32) Stage 3b chronic kidney disease (H)  Last CR 1.07 with GFR >70, recheck BMP on 10/16    Normocytic anemia  Last Hgb 11.4, recheck CBC on 10/16    BPH  Continue tamsulosin 0.4 mg daily    Orders:  Erythromycin gel, recheck A1c/BMP/CBC    Electronically signed by: Wagner Monterroso NP         Sincerely,        Wagner Monterroso NP

## 2023-10-15 ENCOUNTER — LAB REQUISITION (OUTPATIENT)
Dept: LAB | Facility: CLINIC | Age: 78
End: 2023-10-15
Payer: MEDICARE

## 2023-10-15 DIAGNOSIS — E11.9 TYPE 2 DIABETES MELLITUS WITHOUT COMPLICATIONS (H): ICD-10-CM

## 2023-10-16 ENCOUNTER — TELEPHONE (OUTPATIENT)
Dept: GERIATRICS | Facility: CLINIC | Age: 78
End: 2023-10-16

## 2023-10-16 LAB
ANION GAP SERPL CALCULATED.3IONS-SCNC: 12 MMOL/L (ref 7–15)
BUN SERPL-MCNC: 25.2 MG/DL (ref 8–23)
CALCIUM SERPL-MCNC: 9.7 MG/DL (ref 8.8–10.2)
CHLORIDE SERPL-SCNC: 105 MMOL/L (ref 98–107)
CREAT SERPL-MCNC: 1.21 MG/DL (ref 0.67–1.17)
DEPRECATED HCO3 PLAS-SCNC: 25 MMOL/L (ref 22–29)
EGFRCR SERPLBLD CKD-EPI 2021: 61 ML/MIN/1.73M2
ERYTHROCYTE [DISTWIDTH] IN BLOOD BY AUTOMATED COUNT: 16.6 % (ref 10–15)
GLUCOSE SERPL-MCNC: 94 MG/DL (ref 70–99)
HBA1C MFR BLD: 7.5 %
HCT VFR BLD AUTO: 36.1 % (ref 40–53)
HGB BLD-MCNC: 11.4 G/DL (ref 13.3–17.7)
MCH RBC QN AUTO: 25.4 PG (ref 26.5–33)
MCHC RBC AUTO-ENTMCNC: 31.6 G/DL (ref 31.5–36.5)
MCV RBC AUTO: 80 FL (ref 78–100)
PLATELET # BLD AUTO: 239 10E3/UL (ref 150–450)
POTASSIUM SERPL-SCNC: 4.4 MMOL/L (ref 3.4–5.3)
RBC # BLD AUTO: 4.49 10E6/UL (ref 4.4–5.9)
SODIUM SERPL-SCNC: 142 MMOL/L (ref 135–145)
WBC # BLD AUTO: 7.1 10E3/UL (ref 4–11)

## 2023-10-16 PROCEDURE — 83036 HEMOGLOBIN GLYCOSYLATED A1C: CPT | Mod: ORL | Performed by: FAMILY MEDICINE

## 2023-10-16 PROCEDURE — 85027 COMPLETE CBC AUTOMATED: CPT | Mod: ORL | Performed by: FAMILY MEDICINE

## 2023-10-16 PROCEDURE — 36415 COLL VENOUS BLD VENIPUNCTURE: CPT | Mod: ORL | Performed by: FAMILY MEDICINE

## 2023-10-16 PROCEDURE — P9603 ONE-WAY ALLOW PRORATED MILES: HCPCS | Mod: ORL | Performed by: FAMILY MEDICINE

## 2023-10-16 PROCEDURE — 80048 BASIC METABOLIC PNL TOTAL CA: CPT | Mod: ORL | Performed by: FAMILY MEDICINE

## 2023-10-18 ENCOUNTER — OFFICE VISIT (OUTPATIENT)
Dept: VASCULAR SURGERY | Facility: CLINIC | Age: 78
End: 2023-10-18
Attending: PODIATRIST
Payer: MEDICARE

## 2023-10-18 ENCOUNTER — TELEPHONE (OUTPATIENT)
Dept: INFECTIOUS DISEASES | Facility: CLINIC | Age: 78
End: 2023-10-18

## 2023-10-18 VITALS
HEIGHT: 70 IN | HEART RATE: 65 BPM | WEIGHT: 180 LBS | RESPIRATION RATE: 16 BRPM | SYSTOLIC BLOOD PRESSURE: 138 MMHG | BODY MASS INDEX: 25.77 KG/M2 | DIASTOLIC BLOOD PRESSURE: 62 MMHG | OXYGEN SATURATION: 96 %

## 2023-10-18 DIAGNOSIS — Z22.322 MRSA COLONIZATION: ICD-10-CM

## 2023-10-18 DIAGNOSIS — L89.623 PRESSURE ULCER OF LEFT HEEL, STAGE 3 (H): ICD-10-CM

## 2023-10-18 DIAGNOSIS — L89.613 PRESSURE ULCER OF RIGHT HEEL, STAGE 3 (H): Primary | ICD-10-CM

## 2023-10-18 DIAGNOSIS — L97.511 ULCER OF RIGHT FOOT LIMITED TO BREAKDOWN OF SKIN (H): ICD-10-CM

## 2023-10-18 LAB
MRSA DNA SPEC QL NAA+PROBE: POSITIVE
SA TARGET DNA: POSITIVE

## 2023-10-18 PROCEDURE — 87641 MR-STAPH DNA AMP PROBE: CPT | Performed by: PODIATRIST

## 2023-10-18 PROCEDURE — 11045 DBRDMT SUBQ TISS EACH ADDL: CPT | Performed by: PODIATRIST

## 2023-10-18 PROCEDURE — 97597 DBRDMT OPN WND 1ST 20 CM/<: CPT | Performed by: PODIATRIST

## 2023-10-18 PROCEDURE — 11042 DBRDMT SUBQ TIS 1ST 20SQCM/<: CPT | Performed by: PODIATRIST

## 2023-10-18 RX ORDER — ERYTHROMYCIN 5 MG/G
OINTMENT OPHTHALMIC
COMMUNITY
Start: 2023-10-11 | End: 2023-11-17

## 2023-10-18 ASSESSMENT — PAIN SCALES - GENERAL: PAINLEVEL: MILD PAIN (3)

## 2023-10-18 NOTE — PATIENT INSTRUCTIONS
Important lnstructions      WEIGHT BEARING STATUS: You are to remain NON WEIGHT BEARING on your left and right foot. NON WEIGHT BEARING MEANS NO PRESSURE ON YOUR FOOT OR HEEL AT ANY TIME FOR ANY REASON!    2. OFFLOADING DEVICE: Must use a A WHEELCHAIR at all times! (do not use affected foot to push wheelchair)    3. STABILIZATION DEVICE: Use a  PREVALON  . You will need to WEAR THIS AT ALL TIMES EVEN WHILE IN BED.     4. ELEVATE: Elevating your leg means laying with your head on a pillow and your foot ABOVE YOUR HEART.     5. DO NOT MOVE YOUR FOOT.  There is a risk of worsening the wound or incision. To give yourself a higher chance of healing, please DO NOT swing foot back and forth and wiggle foot/toes especially when inside a stabilization device. Limited movement is allowable with therapy as recommended by the doctor.     6. TAKE A PROTEIN SHAKE TWICE A DAY.  (For ex: Boost, Ensure, Glucerna)      Dressing Change lnstructions          - Dressing Application of  Endoform for Right foot and both heel wounds:    1. Endoform should be cut to the size of the wound.  It should touch the edges of the ulcer. It is okay if it overlap the edges a small amount.  Then, moisten the Endoform with saline.(If it is easier for you, you can moisten it before laying it in the wound)    2. Cover the wound with Endoform, followed by dry gauze, and secure with roll gauze if needed.     3. Endoform is naturally used by the body over time so you may not find it in the wound when you change your dressing.  If you do find some, gently cleanse the wound with saline. Do not remove the remaining Endoform, which may appear as an off-white to max gel, just add Endoform on top.  Usually, more Endoform will need to be added every 5-7 days.     4. Change your top dressing every 1-2 days or as needed depending on drainage.    -Endoform is a collagen dressing created from ovine (sheep) fore-stomach tissue. The collagen extracellular matrix  transforms into a soft conforming sheet, which is naturally incorporated into the wound over time.  Collagen dressings are used to stimulate wound healing.   ,        It IS NOT ok to get your wound wet in the bath or shower    SEEK MEDICAL CARE IF:  You have an increase in swelling, pain, or redness around the wound.  You have an increase in the amount of pus coming from the wound.  There is a bad smell coming from the wound.  The wound appears to be worsening/enlarging  You have a fever greater than 101.5 F      It is ok to continue current wound care treatment/products for the next 2-3 days until new wound care supplies are ordered and arrive. If longer than this please contact our office at [phone].        We want to hear from you!   In the next few weeks, you should receive a call or email to complete a survey about your visit at [facility]. Please help us improve your appointment experience by letting us know how we did today. We strive to make your experience good and value any ways in which we could do better.      We value your input and suggestions.    Thank you for choosing the [facility]!

## 2023-10-18 NOTE — TELEPHONE ENCOUNTER
Health Call Center    Phone Message    May a detailed message be left on voicemail: yes     Reason for Call: Other: .     Patients sister, Marie states patients appt with Dr. Spears was moved up for today 10/18/2023. Marie states she was speaking with Nurse Merari and that if Dr. Spears was not going to place orders for patient to have the 2 tests for MRSA she was going to do it. Marie wanted to make sure Merari received the message. Please advise      Action Taken: Message routed to:  [facility] ([facility]): ID    Travel Screening: Not Applicable

## 2023-10-18 NOTE — PROGRESS NOTES
FOOT AND ANKLE SURGERY/PODIATRY Progress Note      ASSESSMENT:   Ulceration right foot into subcutaneous tissues  Stage 3 Pressure Ulceration right heel into subcutaneous tissues  Stage 3 Pressure Ulceration left heel into epidermis/dermis  Venous Stasis     A new wound was identified today: yes,  it is located left heel .    TREATMENT:  -I discussed with the patient and his sister that the right foot and heel ulcerations are measuring smaller than the previous visit.  Left heel ulceration is new and stable without signs of infection.    -I recommend continued skilled nursing for this patient as he has not been compliant with our recommendations.    -I also recommended and referred him to Dr. Robertson for evaluation of bilateral venous stasis with developing ulcerations.     -After discussion of risk factors, nursing staff removed dressing, cleansed wound and consent obtained 2% Lidocaine HCL jelly was applied, under clean conditions, the right foot and right heel ulceration(s) were debrided using #15 blade scalpel.  Devitalized and nonviable tissue, along with any fibrin and slough, was removed to improve granulation tissue formation, stimulate wound healing, decrease overall bacteria load, disrupt biofilm formation and decrease edge senescence. Wound drainage was scant No. Total excisional debridement was 32 sq cm into the subcutaneous tissue with a depth of 0.2 cm.   Ulcers were improved afterwards and .  Measures were as noted on the flow sheet. Collagen with a gauze dresssing was applied. He will continue to apply Collagen with a gauze dresssing as directed.    -After discussion of risk factors, nursing staff removed dressing, cleansed wound and consent obtained 2% Lidocaine HCL jelly was applied, under clean conditions, the left heel ulceration(s) were debrided using #15 blade scalpel.  Devitalized and nonviable tissue, along with any fibrin and slough, was removed to improve granulation tissue formation,  stimulate wound healing, decrease overall bacteria load, disrupt biofilm formation and decrease edge senescence. Wound drainage was scant No. Total excisional debridement was 0.25 sq cm from the epidermis/dermis area with a depth of 0.1 cm.   Ulcers were improved afterwards and .  Measures were as noted on the flow sheet. Collagen with a gauze dresssing was applied. He will continue to apply Collagen with a gauze dresssing as directed.    -He will follow-up in 3-4 weeks    Miguelangel Spears DPM  [facility] [place]      HPI: Ever Crane was seen again today for a right foot ulcerations and right heel ulceration.  Patient sister is present today and states that he would like to return to his living facility.  He is also noticed a new sore on his left heel.    Past Medical History:   Diagnosis Date    A-fib (H)     Acute diastolic heart failure (H) 06/07/2022    Acute posthemorrhagic anemia 05/17/2022    MESHA (acute kidney injury) (H24)     Anemia     Atopic keratoconjunctivitis     Atrial fibrillation (H)     Brian Moe: 8/2011 Cardioversion; CHADS2 VASC = 5; he is on warfarin and sotalol     Atrial flutter (H)     Mcgarry's esophagus 01/01/2012    per note of Dr. Tavo Mike of [facility]    Bilateral lower leg cellulitis 08/31/2018    BPH (benign prostatic hyperplasia)     Candidiasis of perineum 01/03/2018    Carotid stenosis     Carotid stenosis, asymptomatic, right     Cellulitis     CHF (congestive heart failure) (H)     Cholecystitis 10/14/2019    Cholecystitis, acute 08/18/2019    Chronic systolic heart failure (H)     Chronic venous stasis dermatitis     Closed nondisplaced intertrochanteric fracture of left femur with routine healing, subsequent encounter 05/18/2022    Clostridium difficile colitis     Contact with and (suspected) exposure to covid-19 12/13/2021    COPD (chronic obstructive pulmonary disease) (H)     Coronary artery disease due to lipid rich plaque 2000    CABG x2     Coronary atherosclerosis     Diabetes (H)     Diabetic ulcer of both feet (H) 10/31/2017    Diabetic ulcer of toe of right foot associated with diabetes mellitus due to underlying condition, limited to breakdown of skin (H) 01/05/2023    Diabetic ulcer of toe of right foot associated with diabetes mellitus due to underlying condition, with necrosis of bone (H) 01/05/2023    Dyslexia     Dyslipidemia, goal LDL below 70 2000    Epistaxis     Essential hypertension     Gangrene of left foot (H)     GERD (gastroesophageal reflux disease)     HLD (hyperlipidemia)     HTN (hypertension)     Hyponatremia     Ischemic heart disease     Lymphedema     Mild cognitive impairment     MRSA (methicillin resistant Staphylococcus aureus)     Neuropathy     Non-STEMI (non-ST elevated myocardial infarction) (H)     Occlusion and stenosis of right carotid artery     Osteoarthrosis     Osteomyelitis of ankle and foot (H)     Other atopic dermatitis     PAD (peripheral artery disease) (H24)     Peripheral vascular disease (H24)     Pneumonia 06/26/2018    Polyneuropathy due to type 2 diabetes mellitus (H)     Pressure ulcer, heel     Bilateral    Renal insufficiency     Type 2 diabetes mellitus, without long-term current use of insulin (H)     Ulcer of right foot (H)     Unable to function independently 11/13/2017       Past Surgical History:   Procedure Laterality Date    AMPUTATE FOOT Left 11/05/2017    Procedure: LEFT TRANSMETATARSAL AMPUTATION;  Surgeon: Ever Wick MD;  Location: [facility];  Service:     AMPUTATE FOOT Right 4/27/2023    Procedure: Transmetatarsal amputation right foot;  Surgeon: Miguelangel Spears DPM;  [place] [facility] [place]    AMPUTATE FOOT Right 5/15/2023    Procedure: partial calcanectomy;  Surgeon: Miguelangel Spears DPM;  [place] [facility] [place]    BYPASS GRAFT ARTERY CORONARY N/A 03/06/2000    SVG to OM1, SVG to PDA    BYPASS GRAFT ARTERY CORONARY N/A 10/04/2018    redo CABG due to  graft failure    CABG MEASURES GRP      CARDIOVERSION  08/25/2011    CV CORONARY ANGIOGRAM N/A 10/01/2018    Procedure: Coronary Angiogram;  Surgeon: Miki Mac MD;  Location: [facility];  Service:     INCISION AND DRAINAGE FOOT, COMBINED Right 5/15/2023    Procedure: INCISION AND DRAINAGE, right heel with,;  Surgeon: Miguelangel Spears DPM;  Location: [facility]    INCISION AND DRAINAGE FOOT, COMBINED Bilateral 6/1/2023    Procedure: INCISION AND DRAINAGE, right foot with debridement of ulceration bilateral heels;  Surgeon: Miguelangel Spears DPM;  Location: [facility] OR    INGUINAL HERNIA REPAIR Bilateral 1969    and 1979    IR EXTREMITY ANGIOGRAM BILATERAL  11/3/2017    IR LOWER EXTREMITY ANGIOGRAM LEFT  3/10/2023    IR LOWER EXTREMITY ANGIOGRAM RIGHT  1/24/2023    LAPAROSCOPIC CHOLECYSTECTOMY N/A 08/18/2019    Procedure: CHOLECYSTECTOMY, LAPAROSCOPIC;  Surgeon: Stewart Fountain MD;  Location: [facility] OR;  Service: General    LENGTHEN TENDON ACHILLES Right 4/27/2023    Procedure: with Achilles tendon lengthening, debridement of right heel ulceration.;  Surgeon: Miguelangel Spears DPM;  Location: [facility]       Allergies   Allergen Reactions    Lanolin      Other Reaction(s): Not available    Cranberry Extract Itching and Rash    Doxycycline Rash    Latex Rash    Penicillin V Rash     Reaction occurred as a child. Patient tolerated Zosyn 6/2018, Cefazolin 10/2018, and has also tolerated Augmentin.    Penicillins Rash     Reaction occurred as a child. Patient tolerated Zosyn 6/2018, Cefazolin 10/2018, and has also tolerated Augmentin.         Current Outpatient Medications:     acetaminophen (TYLENOL) 325 MG tablet, 1000 mg BID scheduled and 650 mg bid prn, Disp: , Rfl:     albuterol (PROAIR HFA/PROVENTIL HFA/VENTOLIN HFA) 108 (90 Base) MCG/ACT inhaler, Inhale 2 puffs into the lungs 2 times daily as needed, Disp: , Rfl:     clopidogrel (PLAVIX) 75 MG  "tablet, Take 75 mg by mouth daily, Disp: , Rfl:     dulaglutide (TRULICITY) 0.75 MG/0.5ML pen, Inject 1.5 mg Subcutaneous every 7 days, Disp: , Rfl:     erythromycin (ROMYCIN) 5 MG/GM ophthalmic ointment, , Disp: , Rfl:     furosemide (LASIX) 40 MG tablet, Take 40 mg by mouth daily, Disp: , Rfl:     gabapentin (NEURONTIN) 100 MG capsule, Take 200 mg by mouth 2 times daily, Disp: , Rfl:     glimepiride (AMARYL) 1 MG tablet, Take 1 mg by mouth every morning (before breakfast), Disp: , Rfl:     ketoconazole (NIZORAL) 2 % external shampoo, Apply topically twice a week Mon and Fri., Disp: , Rfl:     losartan (COZAAR) 25 MG tablet, Take 12.5 mg by mouth daily, Disp: , Rfl:     mineral oil-hydrophilic petrolatum (AQUAPHOR) external ointment, Apply topically 2 times daily, Disp: , Rfl:     omeprazole (PRILOSEC) 20 MG CR capsule, Take 20 mg by mouth daily, Disp: , Rfl:     rivaroxaban ANTICOAGULANT (XARELTO) 15 MG TABS tablet, Take 20 mg by mouth daily, Disp: , Rfl:     simvastatin (ZOCOR) 40 MG tablet, Take 40 mg by mouth At Bedtime, Disp: , Rfl:     spironolactone (ALDACTONE) 25 MG tablet, Take 25 mg by mouth daily, Disp: , Rfl:     tamsulosin (FLOMAX) 0.4 MG capsule, Take 0.4 mg by mouth daily, Disp: , Rfl:     Review of Systems - 10 point Review of Systems is negative except for bilateral foot ulcerations which is noted in HPI.      OBJECTIVE:  /62   Pulse 65   Resp 16   Ht 5' 10\" (1.778 m)   Wt 180 lb (81.6 kg)   SpO2 96%   BMI 25.83 kg/m    General appearance: Patient is alert and fully cooperative with history & exam.  No sign of distress is noted during the visit.    Vascular: Dorsalis pedis non-palpableBilateral.  Dermatologic:    VASC Wound Right heel (Active)   Pre Size Length 1.4 10/18/23 1008   Pre Size Width 2.5 10/18/23 1008   Pre Size Depth 0.2 10/18/23 1008   Pre Total Sq cm 3.5 10/18/23 1008   Post Size Length 9 01/13/23 1300   Post Size Width 9 01/13/23 1300   Post Size Depth 0.1 01/13/23 1300 "   Post Total Sq cm 81 01/13/23 1300   Description eschar 04/05/23 1000       VASC Wound Left heel (Active)   Pre Size Length 0.5 10/18/23 1008   Pre Size Width 0.5 10/18/23 1008   Pre Size Depth 0.1 10/18/23 1008   Pre Total Sq cm 0.25 10/18/23 1008   Post Size Length 7 01/13/23 1300   Post Size Width 6 01/13/23 1300   Post Size Depth 0.1 01/13/23 1300   Post Total Sq cm 42 01/13/23 1300       VASC Wound Right Distal foot (Active)   Pre Size Length 2 10/18/23 1008   Pre Size Width 14 10/18/23 1008   Pre Size Depth 0.2 10/18/23 1008   Pre Total Sq cm 28 10/18/23 1008       Incision/Surgical Site 06/01/23 Bilateral Foot (Active)   Granular base distal right midfoot ulceration and bilateral heel ulcerations.  No erythema bilateral feet.  Superficial ulcerations with diffuse edema bilateral legs.  Neurologic: Diminished to light touch Bilateral.  Musculoskeletal: TMA right.      Picture:

## 2023-10-19 NOTE — TELEPHONE ENCOUNTER
Pt is positive- pt will have to remain on precautions when in the hospital, and living facility as they have pt on precautions as well.

## 2023-10-19 NOTE — TELEPHONE ENCOUNTER
Lab was preformed at visit on 10/18/23:    Component      Latest Ref Rng 10/18/2023  10:40 AM   MRSA Target DNA      Negative  Positive !    SA Target DNA Positive       Legend:  ! Abnormal

## 2023-10-20 ENCOUNTER — TRANSITIONAL CARE UNIT VISIT (OUTPATIENT)
Dept: GERIATRICS | Facility: CLINIC | Age: 78
End: 2023-10-20
Payer: MEDICARE

## 2023-10-20 VITALS
SYSTOLIC BLOOD PRESSURE: 136 MMHG | DIASTOLIC BLOOD PRESSURE: 76 MMHG | WEIGHT: 181.3 LBS | HEART RATE: 69 BPM | BODY MASS INDEX: 26.01 KG/M2 | OXYGEN SATURATION: 95 % | RESPIRATION RATE: 18 BRPM | TEMPERATURE: 97.9 F

## 2023-10-20 DIAGNOSIS — E11.42 POLYNEUROPATHY DUE TO TYPE 2 DIABETES MELLITUS (H): ICD-10-CM

## 2023-10-20 DIAGNOSIS — E11.59 TYPE 2 DIABETES MELLITUS WITH OTHER CIRCULATORY COMPLICATION, WITH LONG-TERM CURRENT USE OF INSULIN (H): ICD-10-CM

## 2023-10-20 DIAGNOSIS — H57.89 EYE IRRITATION: Primary | ICD-10-CM

## 2023-10-20 DIAGNOSIS — I73.9 PERIPHERAL VASCULAR DISEASE (H): ICD-10-CM

## 2023-10-20 DIAGNOSIS — Z79.4 TYPE 2 DIABETES MELLITUS WITH OTHER CIRCULATORY COMPLICATION, WITH LONG-TERM CURRENT USE OF INSULIN (H): ICD-10-CM

## 2023-10-20 PROCEDURE — 99309 SBSQ NF CARE MODERATE MDM 30: CPT | Performed by: NURSE PRACTITIONER

## 2023-10-20 NOTE — PROGRESS NOTES
[facility]  Episodic/Acute/Follow-Up  [place] MRN: [id number]. Place of Service where encounter took place:  [facility] [[id number]]   Chief Complaint   Patient presents with    RECHECK    HPI: Ever Crane  is a 78 year old (1945), who is being seen today for an episodic care visit. Today's concern is:    Ever seen today on routine follow-up in TCU and at the request of nursing to review his left eye.  He just completed 7 days of erythromycin ointment to this area, and several days ago a compress with artificial tears was started 3 times a day.    Today, Ever says that his eye is not really that itchy, and is not painful.  He does have some drainage which she clears during the day.  He feels like it cason.  He denies a fever, pain in his ears, pressure in his sinuses, or any unusual headache.  He is concerned about his lower extremities, and mentions that his surgical follow-up with Dr. Spears (in about 1 month).     Past Medical and Surgical History reviewed in Epic today.  MEDICATIONS:  Current Outpatient Medications   Medication Sig Dispense Refill    acetaminophen (TYLENOL) 325 MG tablet 1000 mg BID scheduled and 650 mg bid prn      albuterol (PROAIR HFA/PROVENTIL HFA/VENTOLIN HFA) 108 (90 Base) MCG/ACT inhaler Inhale 2 puffs into the lungs 2 times daily as needed      clopidogrel (PLAVIX) 75 MG tablet Take 75 mg by mouth daily      dulaglutide (TRULICITY) 0.75 MG/0.5ML pen Inject 1.5 mg Subcutaneous every 7 days      erythromycin (ROMYCIN) 5 MG/GM ophthalmic ointment       furosemide (LASIX) 40 MG tablet Take 40 mg by mouth daily      gabapentin (NEURONTIN) 100 MG capsule Take 200 mg by mouth 2 times daily      glimepiride (AMARYL) 1 MG tablet Take 1 mg by mouth every morning (before breakfast)      ketoconazole (NIZORAL) 2 % external shampoo Apply topically twice a week Mon and Fri.      losartan (COZAAR) 25 MG tablet Take 12.5 mg by mouth daily      mineral  oil-hydrophilic petrolatum (AQUAPHOR) external ointment Apply topically 2 times daily      omeprazole (PRILOSEC) 20 MG CR capsule Take 20 mg by mouth daily      rivaroxaban ANTICOAGULANT (XARELTO) 15 MG TABS tablet Take 20 mg by mouth daily      simvastatin (ZOCOR) 40 MG tablet Take 40 mg by mouth At Bedtime      spironolactone (ALDACTONE) 25 MG tablet Take 25 mg by mouth daily      tamsulosin (FLOMAX) 0.4 MG capsule Take 0.4 mg by mouth daily       Objective: /76   Pulse 69   Temp 97.9  F (36.6  C)   Resp 18   Wt 82.2 kg (181 lb 4.8 oz)   SpO2 95%   BMI 26.01 kg/m    Exam:  GENERAL APPEARANCE: Alert, in no distress, cooperative.   RESP: Respiratory effort good, no respiratory distress, On RA.   CV: Edema 1+ BLE.  Rubor, with stasis dermatitis is present.  Wounds are covered with Kerlix.  PSYCH: Insight, judgement, and memory are impaired at baseline, affect and mood are happy/engaged.    Labs: Labs done in facility are in EPIC. Please refer to them using [facility] Everywhere.    ASSESSMENT/PLAN:  Eye irritation  Peripheral vascular disease (H24)  Polyneuropathy due to type 2 diabetes mellitus (H)  Type 2 diabetes mellitus with other circulatory complication, with long-term current use of insulin (H)  Acute on chronic.  Ongoing.  Ever underwent treatment for bacterial conjunctivitis of the left eye, and erythromycin ended 2 days ago.  At that time artificial tears and compress were started.  There is no concern for worsening infection, periorbital cellulitis, or sinus involvement.  Mild erythema does remain, and suspect this will clear with a little bit more time.  Ongoing polyneuropathy and associated peripheral vascular disease, which requires nursing surveillance, wound care, and often some surgical intervention.  Following with vascular team in November.  Blood glucose is overall well controlled upon review.  Follow up w/in 1 to 2 weeks or as needed.    Orders:  No new orders.    Electronically  signed by:  Dr. Yanna Dozier, APRN CNP DNP

## 2023-10-20 NOTE — LETTER
10/20/2023        RE: Ever Crane  [address]  [address]  [place] [address]        [facility] GERIATRIC SERVICE  Episodic/Acute/Follow-Up  [place] MRN: [id number]. Place of Service where encounter took place:  [facility] [place] ([place]) [4002]   Chief Complaint   Patient presents with     RECHECK    HPI: Ever Crane  is a 78 year old (1945), who is being seen today for an episodic care visit. Today's concern is:    Ever seen today on routine follow-up in TCU and at the request of nursing to review his left eye.  He just completed 7 days of erythromycin ointment to this area, and several days ago a compress with artificial tears was started 3 times a day.    Today, Ever says that his eye is not really that itchy, and is not painful.  He does have some drainage which she clears during the day.  He feels like it cason.  He denies a fever, pain in his ears, pressure in his sinuses, or any unusual headache.  He is concerned about his lower extremities, and mentions that his surgical follow-up with Dr. Spears (in about 1 month).     Past Medical and Surgical History reviewed in Epic today.  MEDICATIONS:  Current Outpatient Medications   Medication Sig Dispense Refill     acetaminophen (TYLENOL) 325 MG tablet 1000 mg BID scheduled and 650 mg bid prn       albuterol (PROAIR HFA/PROVENTIL HFA/VENTOLIN HFA) 108 (90 Base) MCG/ACT inhaler Inhale 2 puffs into the lungs 2 times daily as needed       clopidogrel (PLAVIX) 75 MG tablet Take 75 mg by mouth daily       dulaglutide (TRULICITY) 0.75 MG/0.5ML pen Inject 1.5 mg Subcutaneous every 7 days       erythromycin (ROMYCIN) 5 MG/GM ophthalmic ointment        furosemide (LASIX) 40 MG tablet Take 40 mg by mouth daily       gabapentin (NEURONTIN) 100 MG capsule Take 200 mg by mouth 2 times daily       glimepiride (AMARYL) 1 MG tablet Take 1 mg by mouth every morning (before breakfast)       ketoconazole (NIZORAL) 2 % external shampoo Apply topically twice  a week Mon and Fri.       losartan (COZAAR) 25 MG tablet Take 12.5 mg by mouth daily       mineral oil-hydrophilic petrolatum (AQUAPHOR) external ointment Apply topically 2 times daily       omeprazole (PRILOSEC) 20 MG CR capsule Take 20 mg by mouth daily       rivaroxaban ANTICOAGULANT (XARELTO) 15 MG TABS tablet Take 20 mg by mouth daily       simvastatin (ZOCOR) 40 MG tablet Take 40 mg by mouth At Bedtime       spironolactone (ALDACTONE) 25 MG tablet Take 25 mg by mouth daily       tamsulosin (FLOMAX) 0.4 MG capsule Take 0.4 mg by mouth daily       Objective: /76   Pulse 69   Temp 97.9  F (36.6  C)   Resp 18   Wt 82.2 kg (181 lb 4.8 oz)   SpO2 95%   BMI 26.01 kg/m    Exam:  GENERAL APPEARANCE: Alert, in no distress, cooperative.   RESP: Respiratory effort good, no respiratory distress, On RA.   CV: Edema 1+ BLE.  Rubor, with stasis dermatitis is present.  Wounds are covered with Kerlix.  PSYCH: Insight, judgement, and memory are impaired at baseline, affect and mood are happy/engaged.    Labs: Labs done in facility are in EPIC. Please refer to them using [employer]/Care Everywhere.    ASSESSMENT/PLAN:  Eye irritation  Peripheral vascular disease (H24)  Polyneuropathy due to type 2 diabetes mellitus (H)  Type 2 diabetes mellitus with other circulatory complication, with long-term current use of insulin (H)  Acute on chronic.  Ongoing.  Ever underwent treatment for bacterial conjunctivitis of the left eye, and erythromycin ended 2 days ago.  At that time artificial tears and compress were started.  There is no concern for worsening infection, periorbital cellulitis, or sinus involvement.  Mild erythema does remain, and suspect this will clear with a little bit more time.  Ongoing polyneuropathy and associated peripheral vascular disease, which requires nursing surveillance, wound care, and often some surgical intervention.  Following with vascular team in November.  Blood glucose is overall well controlled  upon review.  Follow up w/in 1 to 2 weeks or as needed.    Orders:  No new orders.    Electronically signed by:  KEVIN Wen CNP DNP      Sincerely,        KEVIN Bansal CNP

## 2023-11-01 ENCOUNTER — TRANSITIONAL CARE UNIT VISIT (OUTPATIENT)
Dept: GERIATRICS | Facility: CLINIC | Age: 78
End: 2023-11-01
Payer: MEDICARE

## 2023-11-01 VITALS
BODY MASS INDEX: 26.37 KG/M2 | OXYGEN SATURATION: 98 % | RESPIRATION RATE: 16 BRPM | WEIGHT: 183.8 LBS | TEMPERATURE: 97.6 F | SYSTOLIC BLOOD PRESSURE: 133 MMHG | HEART RATE: 72 BPM | DIASTOLIC BLOOD PRESSURE: 69 MMHG

## 2023-11-01 DIAGNOSIS — Z89.432 STATUS POST AMPUTATION OF LEFT FOOT (H): ICD-10-CM

## 2023-11-01 DIAGNOSIS — I73.9 PERIPHERAL VASCULAR DISEASE (H): Primary | ICD-10-CM

## 2023-11-01 DIAGNOSIS — I87.2 VENOUS STASIS DERMATITIS OF BOTH LOWER EXTREMITIES: ICD-10-CM

## 2023-11-01 DIAGNOSIS — Z79.4 TYPE 2 DIABETES MELLITUS WITH OTHER CIRCULATORY COMPLICATION, WITH LONG-TERM CURRENT USE OF INSULIN (H): ICD-10-CM

## 2023-11-01 DIAGNOSIS — E11.59 TYPE 2 DIABETES MELLITUS WITH OTHER CIRCULATORY COMPLICATION, WITH LONG-TERM CURRENT USE OF INSULIN (H): ICD-10-CM

## 2023-11-01 DIAGNOSIS — E11.42 POLYNEUROPATHY DUE TO TYPE 2 DIABETES MELLITUS (H): ICD-10-CM

## 2023-11-01 PROCEDURE — 99310 SBSQ NF CARE HIGH MDM 45: CPT | Performed by: NURSE PRACTITIONER

## 2023-11-01 NOTE — PROGRESS NOTES
[facility] GERIATRIC SERVICE  Episodic/Acute/Follow-Up  [place] MRN: [id number]. Place of Service where encounter took place:  [facility] ([place]) [4002]   Chief Complaint   Patient presents with    RECHECK    HPI: Ever Crane  is a 78 year old (1945), who is being seen today for an episodic care visit. Today's concern is:    Ever seen today on routine follow-up as he continues to rehab in [facility].  Today, nursing notified provider of some leg changes and skin concerns.  Nursing has been monitoring edema fluctuations as well as providing wound care to surgical sites.    Today, Ever says he is really itchy from head to toe.  He has had no new medications started, and has had this problem before.  He denies any seasonal allergies, fever, shortness of breath, or new rash.  He describes his nighttime regimen as this lathering lower extremities with Vanicream and asking nurses to wrap up his legs.  He denies any pain in his legs, and they are not particularly itchy though they do appear excoriated.  He does report bruising from legs.    Past Medical and Surgical History reviewed in Epic today.  MEDICATIONS:  Current Outpatient Medications   Medication Sig Dispense Refill    acetaminophen (TYLENOL) 325 MG tablet 1000 mg BID scheduled and 650 mg bid prn      albuterol (PROAIR HFA/PROVENTIL HFA/VENTOLIN HFA) 108 (90 Base) MCG/ACT inhaler Inhale 2 puffs into the lungs 2 times daily as needed      clopidogrel (PLAVIX) 75 MG tablet Take 75 mg by mouth daily      dulaglutide (TRULICITY) 0.75 MG/0.5ML pen Inject 1.5 mg Subcutaneous every 7 days      erythromycin (ROMYCIN) 5 MG/GM ophthalmic ointment       furosemide (LASIX) 40 MG tablet Take 40 mg by mouth daily      gabapentin (NEURONTIN) 100 MG capsule Take 200 mg by mouth 2 times daily      glimepiride (AMARYL) 1 MG tablet Take 1 mg by mouth every morning (before breakfast)      ketoconazole (NIZORAL) 2 % external shampoo Apply topically twice a week Mon and  Fri.      losartan (COZAAR) 25 MG tablet Take 12.5 mg by mouth daily      mineral oil-hydrophilic petrolatum (AQUAPHOR) external ointment Apply topically 2 times daily      omeprazole (PRILOSEC) 20 MG CR capsule Take 20 mg by mouth daily      rivaroxaban ANTICOAGULANT (XARELTO) 15 MG TABS tablet Take 20 mg by mouth daily      simvastatin (ZOCOR) 40 MG tablet Take 40 mg by mouth At Bedtime      spironolactone (ALDACTONE) 25 MG tablet Take 25 mg by mouth daily      tamsulosin (FLOMAX) 0.4 MG capsule Take 0.4 mg by mouth daily       Objective: /69   Pulse 72   Temp 97.6  F (36.4  C)   Resp 16   Wt 83.4 kg (183 lb 12.8 oz)   SpO2 98%   BMI 26.37 kg/m    Exam:  GENERAL APPEARANCE: Alert, in no distress, cooperative.   RESP: Respiratory effort good, no respiratory distress, On RA.   CV: Edema 1+ BLE. Peripheral pulses are 2+.  Skin: Surgical sounds covered and followed closely by surgeon. Significant dermatitis as noted here w/ weeping and need for debridement bilaterally.   Left leg:      Right leg:    PSYCH: Insight, judgement, and memory are forgetful at baseline, affect and mood are happy/engaged.    Labs: Labs done in facility are in EPIC. Please refer to them using [employer]/Care Everywhere.    ASSESSMENT/PLAN:  Peripheral vascular disease (H24)  Venous stasis dermatitis of both lower extremities  Polyneuropathy due to type 2 diabetes mellitus (H)  Type 2 diabetes mellitus with other circulatory complication, with long-term current use of insulin (H)  Status post amputation of left foot (H)  Acute on chronic. Tenuous.   Ongoing and long-term known peripheral vascular disease with associated dermatitis.  Unfortunately, application of Vanicream and then occlusive dressing at at bedtime has likely led to maceration and potentially a fungal dermatitis.  This is difficult to ascertain as debridement needs to take place first.  Will first order Hibiclens washes to reset microbiome.  We will ask for legs to be open  to air when possible, but noting some granulated tissue on the right shin which should be kept somewhat moist, and therefore will loosely cover this area.  To aid with debridement and sloughing, will first apply Triad.  Will discontinue Vanicream and ideally keep legs open to air when we can.  It is likely that either a topical antifungal or topical steroid would be needed once we get this area cleaned up better.  For itching, will start Zyrtec.  Ever had previously been on this and it was effective.  Would consider Vistaril, but do not want to sedate as Ever is a fall risk.  Diabetes does not appear to be exacerbated, but this can certainly inhibit healing of bilateral lower extremities.  Follow-up with surgical team is scheduled for next week on 11/10.  Follow up w/in 1 week or as needed.    Orders:  Wash BLE at bedtime w/ Hibaclens x 3 days.  Apply triad paste to RLE, cover granulation w/ telfa and loosely wrap w/ Kerlix. Leave LLE CRYSTAL.  Discontinue Vanicream.  Zyrtec 5mg PO Qday. Dx: itching/rash.    Electronically signed by:  Dr. Yanna Dozier, APRN CNP DNP

## 2023-11-01 NOTE — LETTER
11/1/2023        RE: Ever Crane  [address]  [address]  [place] [address]        [facility] GERIATRIC SERVICE  Episodic/Acute/Follow-Up  [place] MRN: [id number]. Place of Service where encounter took place:  [facility] ([facility]) [4002]   Chief Complaint   Patient presents with     RECHECK    HPI: Ever Crane  is a 78 year old (1945), who is being seen today for an episodic care visit. Today's concern is:    Ever seen today on routine follow-up as he continues to rehab in TCU.  Today, nursing notified provider of some leg changes and skin concerns.  Nursing has been monitoring edema fluctuations as well as providing wound care to surgical sites.    Today, Ever says he is really itchy from head to toe.  He has had no new medications started, and has had this problem before.  He denies any seasonal allergies, fever, shortness of breath, or new rash.  He describes his nighttime regimen as this lathering lower extremities with Vanicream and asking nurses to wrap up his legs.  He denies any pain in his legs, and they are not particularly itchy though they do appear excoriated.  He does report bruising from legs.    Past Medical and Surgical History reviewed in Epic today.  MEDICATIONS:  Current Outpatient Medications   Medication Sig Dispense Refill     acetaminophen (TYLENOL) 325 MG tablet 1000 mg BID scheduled and 650 mg bid prn       albuterol (PROAIR HFA/PROVENTIL HFA/VENTOLIN HFA) 108 (90 Base) MCG/ACT inhaler Inhale 2 puffs into the lungs 2 times daily as needed       clopidogrel (PLAVIX) 75 MG tablet Take 75 mg by mouth daily       dulaglutide (TRULICITY) 0.75 MG/0.5ML pen Inject 1.5 mg Subcutaneous every 7 days       erythromycin (ROMYCIN) 5 MG/GM ophthalmic ointment        furosemide (LASIX) 40 MG tablet Take 40 mg by mouth daily       gabapentin (NEURONTIN) 100 MG capsule Take 200 mg by mouth 2 times daily       glimepiride (AMARYL) 1 MG tablet Take 1 mg by mouth every morning  (before breakfast)       ketoconazole (NIZORAL) 2 % external shampoo Apply topically twice a week Mon and Fri.       losartan (COZAAR) 25 MG tablet Take 12.5 mg by mouth daily       mineral oil-hydrophilic petrolatum (AQUAPHOR) external ointment Apply topically 2 times daily       omeprazole (PRILOSEC) 20 MG CR capsule Take 20 mg by mouth daily       rivaroxaban ANTICOAGULANT (XARELTO) 15 MG TABS tablet Take 20 mg by mouth daily       simvastatin (ZOCOR) 40 MG tablet Take 40 mg by mouth At Bedtime       spironolactone (ALDACTONE) 25 MG tablet Take 25 mg by mouth daily       tamsulosin (FLOMAX) 0.4 MG capsule Take 0.4 mg by mouth daily       Objective: /69   Pulse 72   Temp 97.6  F (36.4  C)   Resp 16   Wt 83.4 kg (183 lb 12.8 oz)   SpO2 98%   BMI 26.37 kg/m    Exam:  GENERAL APPEARANCE: Alert, in no distress, cooperative.   RESP: Respiratory effort good, no respiratory distress, On RA.   CV: Edema 1+ BLE. Peripheral pulses are 2+.  Skin: Surgical sounds covered and followed closely by surgeon. Significant dermatitis as noted here w/ weeping and need for debridement bilaterally.   Left leg:      Right leg:    PSYCH: Insight, judgement, and memory are forgetful at baseline, affect and mood are happy/engaged.    Labs: Labs done in facility are in EPIC. Please refer to them using [employer]/Care Everywhere.    ASSESSMENT/PLAN:  Peripheral vascular disease (H24)  Venous stasis dermatitis of both lower extremities  Polyneuropathy due to type 2 diabetes mellitus (H)  Type 2 diabetes mellitus with other circulatory complication, with long-term current use of insulin (H)  Status post amputation of left foot (H)  Acute on chronic. Tenuous.   Ongoing and long-term known peripheral vascular disease with associated dermatitis.  Unfortunately, application of Vanicream and then occlusive dressing at at bedtime has likely led to maceration and potentially a fungal dermatitis.  This is difficult to ascertain as debridement  needs to take place first.  Will first order Hibiclens washes to reset microbiome.  We will ask for legs to be open to air when possible, but noting some granulated tissue on the right shin which should be kept somewhat moist, and therefore will loosely cover this area.  To aid with debridement and sloughing, will first apply Triad.  Will discontinue Vanicream and ideally keep legs open to air when we can.  It is likely that either a topical antifungal or topical steroid would be needed once we get this area cleaned up better.  For itching, will start Zyrtec.  Ever had previously been on this and it was effective.  Would consider Vistaril, but do not want to sedate as Ever is a fall risk.  Diabetes does not appear to be exacerbated, but this can certainly inhibit healing of bilateral lower extremities.  Follow-up with surgical team is scheduled for next week on 11/10.  Follow up w/in 1 week or as needed.    Orders:  Wash BLE at bedtime w/ Hibaclens x 3 days.  Apply triad paste to RLE, cover granulation w/ telfa and loosely wrap w/ Kerlix. Leave LLE CRYSTAL.  Discontinue Vanicream.  Zyrtec 5mg PO Qday. Dx: itching/rash.    Electronically signed by:  Dr. Yanna Dozier, KEVIN CNP DNP        Sincerely,        KEVIN Bansal CNP

## 2023-11-10 ENCOUNTER — OFFICE VISIT (OUTPATIENT)
Dept: VASCULAR SURGERY | Facility: CLINIC | Age: 78
End: 2023-11-10
Attending: PODIATRIST
Payer: MEDICARE

## 2023-11-10 VITALS
BODY MASS INDEX: 26.2 KG/M2 | HEART RATE: 65 BPM | WEIGHT: 183 LBS | SYSTOLIC BLOOD PRESSURE: 132 MMHG | HEIGHT: 70 IN | DIASTOLIC BLOOD PRESSURE: 64 MMHG | OXYGEN SATURATION: 97 %

## 2023-11-10 DIAGNOSIS — G31.84 MILD COGNITIVE IMPAIRMENT: ICD-10-CM

## 2023-11-10 DIAGNOSIS — Z91.199 MEDICALLY NONCOMPLIANT: ICD-10-CM

## 2023-11-10 DIAGNOSIS — N18.32 STAGE 3B CHRONIC KIDNEY DISEASE (H): Chronic | ICD-10-CM

## 2023-11-10 DIAGNOSIS — L89.613 PRESSURE ULCER OF RIGHT HEEL, STAGE 3 (H): ICD-10-CM

## 2023-11-10 DIAGNOSIS — Z89.432 STATUS POST AMPUTATION OF LEFT FOOT (H): Chronic | ICD-10-CM

## 2023-11-10 DIAGNOSIS — I89.0 ACQUIRED LYMPHEDEMA OF LOWER EXTREMITY: ICD-10-CM

## 2023-11-10 DIAGNOSIS — I73.9 PAD (PERIPHERAL ARTERY DISEASE) (H): ICD-10-CM

## 2023-11-10 DIAGNOSIS — I50.30 HEART FAILURE WITH PRESERVED EJECTION FRACTION, NYHA CLASS II (H): Chronic | ICD-10-CM

## 2023-11-10 DIAGNOSIS — I70.25 ATHEROSCLEROSIS OF NATIVE ARTERIES OF OTHER EXTREMITIES WITH ULCERATION (H): ICD-10-CM

## 2023-11-10 DIAGNOSIS — Z22.322 MRSA COLONIZATION: ICD-10-CM

## 2023-11-10 DIAGNOSIS — L30.9 DERMATITIS: ICD-10-CM

## 2023-11-10 DIAGNOSIS — Z79.01 LONG TERM CURRENT USE OF ANTICOAGULANT THERAPY: ICD-10-CM

## 2023-11-10 DIAGNOSIS — L89.623 PRESSURE ULCER OF LEFT HEEL, STAGE 3 (H): ICD-10-CM

## 2023-11-10 DIAGNOSIS — I87.2 CHRONIC VENOUS STASIS DERMATITIS: ICD-10-CM

## 2023-11-10 DIAGNOSIS — T81.329D DEHISCENCE OF INTERNAL SURGICAL WOUND, SUBSEQUENT ENCOUNTER: Primary | ICD-10-CM

## 2023-11-10 DIAGNOSIS — I87.303 VENOUS HYPERTENSION, CHRONIC, BILATERAL: ICD-10-CM

## 2023-11-10 PROCEDURE — 11042 DBRDMT SUBQ TIS 1ST 20SQCM/<: CPT | Performed by: NURSE PRACTITIONER

## 2023-11-10 RX ORDER — CLOTRIMAZOLE AND BETAMETHASONE DIPROPIONATE 10; .64 MG/G; MG/G
CREAM TOPICAL
Qty: 45 G | Refills: 3 | Status: SHIPPED | OUTPATIENT
Start: 2023-11-10 | End: 2024-02-16

## 2023-11-10 RX ORDER — MUPIROCIN CALCIUM 20 MG/G
CREAM TOPICAL
Qty: 30 G | Refills: 3 | Status: SHIPPED | OUTPATIENT
Start: 2023-11-10 | End: 2024-02-16

## 2023-11-10 ASSESSMENT — PAIN SCALES - GENERAL: PAINLEVEL: NO PAIN (0)

## 2023-11-10 NOTE — PATIENT INSTRUCTIONS
"Sent prescriptions for Lotrisone and Mupirocin creams    We will update the arterial studies    Needs to elevate in bed during the day 4 times per day for 40 minute increments    Wound care supplies were not ordered or needed today. Facility to order        Wound Care Instructions    EVERY OTHER DAY and as needed, Cleanse your bilateral legs wound(s) with Dilute hibiclens 30cc in 500cc NS.    Pat Dry with non-sterile gauze    Apply Lotion to the intact skin surrounding your wound and other dry skin locations. Some good lotions include: Remedy Skin Repair Cream, Sarna, Vanicream or Cetaphil    Primary Dressing: Apply mixture of lotrisone and mupirocin cream on the legs; rash areas and wounds into/onto the wounds    Apply endoform collagen to the right Transmetatarsal amputation site and right posterior heel    Secondary dressing: Cover with gauze    Wrap the legs with viscopaste; using spiral technique    Secure with non-sterile roll gauze (4\" x 75\" roll) and tape (1\" roll tape) as needed; avoid adhesive directly on the skin    Compression: tubular compression    It is not ok to get your wound wet in the bath or shower      If for some reason you are not able to get your dressing(s) changed as outlined above (due to illness, lack of supplies, lack of help) please do the following: remove old, soiled dressings; wash the wounds with saline; pat dry; apply ABD pad or other absorbant pad and secure with rolled gauze; avoid tape directly on your skin; Call the clinic as soon as possible to let us know what the current issues are in receiving wound care [phone].      SEEK MEDICAL CARE IF:  You have an increase in swelling, pain, or redness around the wound.  You have an increase in the amount of pus coming from the wound.  There is a bad smell coming from the wound.  The wound appears to be worsening/enlarging  You have a fever greater than 101.5 F      It is ok to continue current wound care treatment/products for " the next 2-3 days until new wound care supplies are ordered and arrive. If longer than this please contact our office at [phone].    If you have a 2 layer or 4 layer compression wrap on these are safe to have on for ONLY 7 days. If for some reason you are not able to get the wrap(s) changed (due to illness; lack of supplies, lack of help, lack of transportation) please do the following: unwrap the old 2 or 4 layer compression wrap; avoid using scissors as you could cut your skin and cause wounds; use tubular compression when available. Call to reschedule your home care or clinic visit appointment as soon as possible.    Please NOTE: if you are 15 minutes late to your clinic appointment you will have to be rescheduled. Please call our clinic as soon as possible if you know you will not be able to get to your appointment at [phone].    If you fail to show up to 3 scheduled clinic appointments you will be dismissed from our clinic.              We want to hear from you!  In the next few weeks, you should receive a call or email to complete a survey about your visit at [facility]. Please help us improve your appointment experience by letting us know how we did today. We strive to make your experience good and value any ways in which we could do better.      We value your input and suggestions.    Thank you for choosing the [facility]!           It is recommended that you do not get your ulcer wet when showering.  Listed below are several ways of keeping it dry when you shower.     1. Wrap it with [employer] plastic wrap.  It can be found in the stores where the plastic wraps or tin foil is kept.               2.  Some people take a bath and hang their leg/foot out of the tub.                        3  Put your leg in a plastic bag and tape it on.           4. You can purchase a shower cover for casts at some pharmacies and through the Internet.            5. Take a  Bed Bath or wash up at the sink     High Protein Foods  Chicken  -Chicken breast, 3.5oz.-30 grams protein  -Chicken thigh-10 grams(average size)  -Drumstick-11 grams  -Wing- 6 grams  -Chicken meat, cooked, 4 oz.  Beef  -Hamburger jacquelin, 4 oz-28 grams protein  -Steak, 6 oz-42 grams  -Most cuts of beef- 7 grams of protein per ounce  Fish  -Most fish fillets or steaks are about 22 grams of protein for 3 1/2 oz(100 grams) of cooked fish, or 6 grams per ounce  -Tuna, 6 oz can-40 grams of protein  Pork  -Pork chop, average-22 grams protein  -Pork loin or tenderloin, 4 oz.-29 grams  -Ham, 3oz serving- 19 grams  -Ground pork 3oz cooked-22 grams  -Nelson, 1 slice-3 grams  -[place]-style nelson(black nelson), slice-5-6 grams  Eggs and Dairy  -Egg, large-7 grams  -Milk, 1 cup-8 grams  -Cottage cheese, 1/2 cup-15 grams  -Greek yogurt, 1 cup-usually 8-12 grams, check label  -Soft cheeses (Mozzarella, Brie, Camembert)- 6 grams  -Medium cheeses(cheddar, swiss)- 7 or 8 grams per oz  -Hard cheeses(parmesan)- 10 grams per oz  Beans  -Tofu, 1/2 cup 20 grams  -Tofu, 1 oz., 2.3 grams  -Soy milk, 1 cup-6-10 grams  -Most beans(black, vicente, lentils, etc.) about 7-10 grams protein per half cup of cooked beans  -soy beans, 1/2 cup cooked-14 grams  -Split peas, 1/2 cup cooked- 8 grams  Nuts and Seeds  -Peanut butter, 2 Tablespoons- 8 grams protein  -Almonds, 1/4 cup- 8 grams  -Peanuts, 1/4 cup-9 grams  -Cashews, 1/4 cup- 5 grams  -Pecans, 1/4 cup- 2.5 grams  -Sunflower seeds, 1/4 cup- 6 grams  -Pumpkin seeds, 1/4 cup-8 grams  -Flax seeds- 1/4 cup- 8 grams  Protein Supplements  -Ensure  -Boost  -Glucerna, if diabetic  When you have an open ulcer, your bodies protein needs are much higher, so it is recommended eat good sources of protein       Prevalon Boot          EZ Heelift Boot              Prafo Boot          Foam Ring

## 2023-11-10 NOTE — PROGRESS NOTES
Follow up Vascular Visit       Date of Service:11/10/23      Chief Complaint: right foot non-healing TMA; right foot neuropathic ulcer; BLE swelling; BLE venous stasis ulcer, PAD      Pt returns to [facility] with regards to their right foot non-healing TMA; right foot neuropathic ulcer; BLE swelling; BLE venous stasis ulcer, PAD. They arrive today with sister. He used a w/c to get to the room. He is currently residing at [facility]. He has been following with Dr. Spears for heel and foot ulcers; next follow up is 11/29/23. They are currently using endoform  to the wounds; zinc oxide to the legs. This is being done by staff at the facility every 2-3 days. They are using nothing for compression. They are feeling well today. Denies fevers, chills. No shortness of breath. Has been seen by Dr. Jackson and Dr. Hamm with Vascular surgery in the past; his last arterial studies were abnormal; Dr. Jackson felt he had adequate flow at that time for healing of the heel ulcers. He did not feel there was anything further that could be done for re-vascularization.       Allergies:   Allergies   Allergen Reactions    Lanolin      Other Reaction(s): Not available    Cranberry Extract Itching and Rash    Doxycycline Rash    Latex Rash    Penicillin V Rash     Reaction occurred as a child. Patient tolerated Zosyn 6/2018, Cefazolin 10/2018, and has also tolerated Augmentin.    Penicillins Rash     Reaction occurred as a child. Patient tolerated Zosyn 6/2018, Cefazolin 10/2018, and has also tolerated Augmentin.       Medications:   Current Outpatient Medications:     acetaminophen (TYLENOL) 325 MG tablet, 1000 mg BID scheduled and 650 mg bid prn, Disp: , Rfl:     albuterol (PROAIR HFA/PROVENTIL HFA/VENTOLIN HFA) 108 (90 Base) MCG/ACT inhaler, Inhale 2 puffs into the lungs 2 times daily as needed, Disp: , Rfl:     clopidogrel (PLAVIX) 75 MG tablet, Take 75 mg by mouth daily, Disp: , Rfl:      clotrimazole-betamethasone (LOTRISONE) 1-0.05 % external cream, Apply topically every 48 hours Apply to legs every other day, Disp: 45 g, Rfl: 3    dulaglutide (TRULICITY) 0.75 MG/0.5ML pen, Inject 1.5 mg Subcutaneous every 7 days, Disp: , Rfl:     erythromycin (ROMYCIN) 5 MG/GM ophthalmic ointment, , Disp: , Rfl:     furosemide (LASIX) 40 MG tablet, Take 40 mg by mouth daily, Disp: , Rfl:     gabapentin (NEURONTIN) 100 MG capsule, Take 200 mg by mouth 2 times daily, Disp: , Rfl:     glimepiride (AMARYL) 1 MG tablet, Take 1 mg by mouth every morning (before breakfast), Disp: , Rfl:     ketoconazole (NIZORAL) 2 % external shampoo, Apply topically twice a week Mon and Fri., Disp: , Rfl:     losartan (COZAAR) 25 MG tablet, Take 12.5 mg by mouth daily, Disp: , Rfl:     mineral oil-hydrophilic petrolatum (AQUAPHOR) external ointment, Apply topically 2 times daily, Disp: , Rfl:     mupirocin (BACTROBAN) 2 % external cream, Apply topically every 48 hours Apply to BLE every other day, Disp: 30 g, Rfl: 3    omeprazole (PRILOSEC) 20 MG CR capsule, Take 20 mg by mouth daily, Disp: , Rfl:     rivaroxaban ANTICOAGULANT (XARELTO) 15 MG TABS tablet, Take 20 mg by mouth daily, Disp: , Rfl:     simvastatin (ZOCOR) 40 MG tablet, Take 40 mg by mouth At Bedtime, Disp: , Rfl:     spironolactone (ALDACTONE) 25 MG tablet, Take 25 mg by mouth daily, Disp: , Rfl:     tamsulosin (FLOMAX) 0.4 MG capsule, Take 0.4 mg by mouth daily, Disp: , Rfl:     History:   Past Medical History:   Diagnosis Date    A-fib (H)     Acute diastolic heart failure (H) 06/07/2022    Acute posthemorrhagic anemia 05/17/2022    MESHA (acute kidney injury) (H24)     Anemia     Atopic keratoconjunctivitis     Atrial fibrillation (H)     Brian Moe: 8/2011 Cardioversion; CHADS2 VASC = 5; he is on warfarin and sotalol     Atrial flutter (H)     Mcgarry's esophagus 01/01/2012    per note of Dr. Tavo Mike of [facility]    Bilateral lower leg cellulitis 08/31/2018    BPH  (benign prostatic hyperplasia)     Candidiasis of perineum 01/03/2018    Carotid stenosis     Carotid stenosis, asymptomatic, right     Cellulitis     CHF (congestive heart failure) (H)     Cholecystitis 10/14/2019    Cholecystitis, acute 08/18/2019    Chronic systolic heart failure (H)     Chronic venous stasis dermatitis     Closed nondisplaced intertrochanteric fracture of left femur with routine healing, subsequent encounter 05/18/2022    Clostridium difficile colitis     Contact with and (suspected) exposure to covid-19 12/13/2021    COPD (chronic obstructive pulmonary disease) (H)     Coronary artery disease due to lipid rich plaque 2000    CABG x2    Coronary atherosclerosis     Diabetes (H)     Diabetic ulcer of both feet (H) 10/31/2017    Diabetic ulcer of toe of right foot associated with diabetes mellitus due to underlying condition, limited to breakdown of skin (H) 01/05/2023    Diabetic ulcer of toe of right foot associated with diabetes mellitus due to underlying condition, with necrosis of bone (H) 01/05/2023    Dyslexia     Dyslipidemia, goal LDL below 70 2000    Epistaxis     Essential hypertension     Gangrene of left foot (H)     GERD (gastroesophageal reflux disease)     HLD (hyperlipidemia)     HTN (hypertension)     Hyponatremia     Ischemic heart disease     Lymphedema     Mild cognitive impairment     MRSA (methicillin resistant Staphylococcus aureus)     Neuropathy     Non-STEMI (non-ST elevated myocardial infarction) (H)     Occlusion and stenosis of right carotid artery     Osteoarthrosis     Osteomyelitis of ankle and foot (H)     Other atopic dermatitis     PAD (peripheral artery disease) (H24)     Peripheral vascular disease (H24)     Pneumonia 06/26/2018    Polyneuropathy due to type 2 diabetes mellitus (H)     Pressure ulcer, heel     Bilateral    Renal insufficiency     Type 2 diabetes mellitus, without long-term current use of insulin (H)     Ulcer of right foot (H)     Unable to  "function independently 11/13/2017       Physical Exam:    /64   Pulse 65   Ht 5' 10\" (1.778 m)   Wt 183 lb (83 kg)   SpO2 97%   BMI 26.26 kg/m      General:  Patient presents to clinic in no apparent distress.  Head: normocephalic atraumatic  Psychiatric:  Alert and oriented x3.   Respiratory: unlabored breathing; no cough  Integumentary:  Skin is uniformly warm, dry and pink.    Ulcer #1 Location: right TMA  Size: 1.5L x 12W x 0.1depth.  no sinus tract present, Wound base: slough with surrounding callous build up  no undermining present. Ulcer is full thickness. There is moderate drainage. Periwound: no denudement, erythema, induration, maceration or warmth.      Ulcer #2 Location: right heel Size:1x1.8x0.1depth.  no sinus tract present, Wound base: slough with surrounding callous build up  no undermining present. Ulcer is full thickness. There is moderate drainage. Periwound: no denudement, erythema, induration, maceration or warmth.      BLE with +2 edema; there is extensive paste on the skin with scaling and scattered small full thickness ulcers underneath; several appear as excoriation; diffuse rash; scaling; dry skin        PICC Single Lumen Right Basilic (Active)   Number of days:        VASC Wound Right heel (Active)   Pre Size Length 1 11/10/23 1000   Pre Size Width 1.5 11/10/23 1000   Pre Size Depth 0.1 11/10/23 1000   Pre Total Sq cm 1.5 11/10/23 1000   Post Size Length 1 11/10/23 1000   Post Size Width 1.8 11/10/23 1000   Post Size Depth 0.1 11/10/23 1000   Post Total Sq cm 1.8 11/10/23 1000   Description eschar 04/05/23 1000   Number of days: 301       VASC Wound Left heel (Active)   Pre Size Length 0.5 10/18/23 1008   Pre Size Width 0.5 10/18/23 1008   Pre Size Depth 0.1 10/18/23 1008   Pre Total Sq cm 0.25 10/18/23 1008   Post Size Length 7 01/13/23 1300   Post Size Width 6 01/13/23 1300   Post Size Depth 0.1 01/13/23 1300   Post Total Sq cm 42 01/13/23 1300   Description CALLOUS 11/10/23 " 1000   Number of days: 301       VASC Wound Right Distal foot (Active)   Pre Size Length 1.8 11/10/23 1000   Pre Size Width 7.5 11/10/23 1000   Pre Size Depth 0.2 11/10/23 1000   Pre Total Sq cm 13.5 11/10/23 1000   Post Size Length 1.5 11/10/23 1000   Post Size Width 12 11/10/23 1000   Post Size Depth 0.1 11/10/23 1000   Post Total Sq cm 18 11/10/23 1000   Number of days: 219       Incision/Surgical Site 06/01/23 Bilateral Foot (Active)   Number of days: 162            Circumferential volume measures:          11/10/2023    10:00 AM   Circumferential Measures   Right just above MTP 26   Right Ankle 35   Right Widest Calf 37   Left - just above MTP 26   Left Ankle 28   Left Widest Calf 34       Labs:    I personally reviewed the following lab results today and those on care everywhere    CRP Inflammation   Date Value Ref Range Status   06/29/2018 120.0 (H) 0.0 - 8.0 mg/L Final     CRP   Date Value Ref Range Status   01/03/2019 4.0 (H) 0.0 - 0.8 mg/dL Final      Sed Rate   Date Value Ref Range Status   06/27/2018 50 (H) 0 - 20 mm/h Final     Erythrocyte Sedimentation Rate   Date Value Ref Range Status   01/17/2023 48 (H) 0 - 15 mm/hr Final      Last Renal Panel:  Sodium   Date Value Ref Range Status   10/16/2023 142 135 - 145 mmol/L Final     Comment:     Reference intervals for this test were updated on 09/26/2023 to more accurately reflect our healthy population. There may be differences in the flagging of prior results with similar values performed with this method. Interpretation of those prior results can be made in the context of the updated reference intervals.    06/30/2018 139 133 - 144 mmol/L Final     Potassium   Date Value Ref Range Status   10/16/2023 4.4 3.4 - 5.3 mmol/L Final   01/24/2023 5.5 (H) 3.5 - 5.0 mmol/L Final   06/30/2018 4.1 3.4 - 5.3 mmol/L Final     Chloride   Date Value Ref Range Status   10/16/2023 105 98 - 107 mmol/L Final   01/24/2023 107 98 - 107 mmol/L Final   06/30/2018 108 94 - 109  "mmol/L Final     Carbon Dioxide   Date Value Ref Range Status   06/30/2018 20 20 - 32 mmol/L Final     Carbon Dioxide (CO2)   Date Value Ref Range Status   10/16/2023 25 22 - 29 mmol/L Final   01/24/2023 21 (L) 22 - 31 mmol/L Final     Anion Gap   Date Value Ref Range Status   10/16/2023 12 7 - 15 mmol/L Final   01/24/2023 11 5 - 18 mmol/L Final   06/30/2018 11 3 - 14 mmol/L Final     Glucose   Date Value Ref Range Status   10/16/2023 94 70 - 99 mg/dL Final   01/24/2023 106 70 - 125 mg/dL Final   06/30/2018 162 (H) 70 - 99 mg/dL Final     GLUCOSE BY METER POCT   Date Value Ref Range Status   06/01/2023 117 (H) 70 - 99 mg/dL Final     Urea Nitrogen   Date Value Ref Range Status   10/16/2023 25.2 (H) 8.0 - 23.0 mg/dL Final   01/24/2023 22 8 - 28 mg/dL Final   06/30/2018 22 7 - 30 mg/dL Final     Creatinine   Date Value Ref Range Status   10/16/2023 1.21 (H) 0.67 - 1.17 mg/dL Final   06/30/2018 1.20 0.66 - 1.25 mg/dL Final     GFR Estimate   Date Value Ref Range Status   10/16/2023 61 >60 mL/min/1.73m2 Final   03/09/2021 46 (L) >60 mL/min/1.73m2 Final   06/30/2018 59 (L) >60 mL/min/1.7m2 Final     Comment:     Non  GFR Calc     Calcium   Date Value Ref Range Status   10/16/2023 9.7 8.8 - 10.2 mg/dL Final   06/30/2018 9.0 8.5 - 10.1 mg/dL Final     Phosphorus   Date Value Ref Range Status   08/19/2019 2.4 (L) 2.5 - 4.5 mg/dL Final     Albumin   Date Value Ref Range Status   08/18/2023 4.0 3.5 - 5.2 g/dL Final   10/21/2021 4.1 3.5 - 5.0 g/dL Final   06/29/2018 2.8 (L) 3.4 - 5.0 g/dL Final      Lab Results   Component Value Date    WBC 7.1 10/16/2023    WBC 7.3 06/30/2018     Lab Results   Component Value Date    RBC 4.49 10/16/2023    RBC 3.67 06/30/2018     Lab Results   Component Value Date    HGB 11.4 10/16/2023    HGB 10.5 06/30/2018     Lab Results   Component Value Date    HCT 36.1 10/16/2023    HCT 30.5 06/30/2018     No components found for: \"MCT\"  Lab Results   Component Value Date    MCV 80 " "10/16/2023    MCV 83 06/30/2018     Lab Results   Component Value Date    MCH 25.4 10/16/2023    MCH 28.6 06/30/2018     Lab Results   Component Value Date    MCHC 31.6 10/16/2023    MCHC 34.4 06/30/2018     Lab Results   Component Value Date    RDW 16.6 10/16/2023    RDW 13.7 06/30/2018     Lab Results   Component Value Date     10/16/2023     06/30/2018      Lab Results   Component Value Date    A1C 7.5 10/16/2023    A1C 6.6 05/24/2023    A1C 6.7 05/15/2023    A1C 7.0 07/12/2022    A1C 6.3 10/21/2021    A1C 7.7 06/26/2018    A1C 7.3 04/20/2018      TSH   Date Value Ref Range Status   07/12/2022 3.20 0.30 - 4.20 uIU/mL Final   03/09/2021 2.06 0.30 - 5.00 uIU/mL Final   06/26/2018 0.86 0.40 - 4.00 mU/L Final      No results found for: \"VITDT\"                Impression:  Encounter Diagnoses   Name Primary?    Dehiscence of internal surgical wound, subsequent encounter Yes    Pressure ulcer of right heel, stage 3 (H)     Pressure ulcer of left heel, stage 3 (H)     MRSA colonization     Atherosclerosis of native arteries of other extremities with ulceration (H)     PAD (peripheral artery disease) (H24)     Dermatitis     Status post amputation of left foot (H)     Mild cognitive impairment     Long term current use of anticoagulant therapy     Medically noncompliant     Stage 3b chronic kidney disease (H)     Chronic venous stasis dermatitis     Acquired lymphedema of lower extremity     Venous hypertension, chronic, bilateral     Heart failure with preserved ejection fraction, NYHA class II (H)                              Are any of these ulcers new today: No; Location: na    Assessment/Plan:          1. Debridement: Nursing staff removed the old dressing and cleanse the wound(s) with specified solution. After discussion of risk factors and verbal consent was obtained 2% Lidocaine HCL jelly was applied, under clean conditions, the BLE legs and right foot ulceration(s) were debrided using currette. " Devitalized and nonviable tissue, along with any fibrin and slough, was removed to improve granulation tissue formation, stimulate wound healing, decrease overall bacteria load, disrupt biofilm formation and decrease edge senescence.  Total excisional debridement was 19.8 sq cm from the epidermis/dermis area and into the subcutaneous tissue with a depth of 0.1 cm.   Ulcers were improved afterwards and .  Measures were as noted on the flow sheet.       2.  Ulcer treatment: ulcer treatment will include irrigation and dressings to promote autolytic debridement which will include:will continue endoform on the right TMA and right heel; cover with gauze; will use mixture of lotrisone and mupirocin on the legs; wrap legs with viscopaste; rolled gauze; change 2-3 times per week.  If for some reason the patient is not able to get their dressing(s) changed as outlined above (due to illness, lack of supplies, lack of help) please do the following: remove old, soiled dressings; wash the ulcers with saline; pat dry; apply ABD pad or other absorbant pad and secure with rolled gauze; avoid tape directly on your skin; patient instructed to call the clinic as soon as possible to let us know what the current issues are in receiving ulcer care. Stable            3. Edema: will use light compression with tubular compression and encouraged elevation. The compression wraps were applied today in clinic.     If a 2 layer or 4 layer compression wrap is being used; these are safe to have on for ONLY 7 days. If for some reason the patient is not able to get the wrap(s) changed (due to illness; lack of supplies, lack of help, lack of transportation) please do the following: unwrap the old 2 or 4 layer compression wrap; avoid using scissors as you could cut your skin and cause ulcers; use tubular compression when available. Call to reschedule your home care or clinic visit appointment as soon as possible.             4. Nutrition:  diabetic diet with focus on protein           5. Offloading: prevalon boots at all times bilaterally          6. Wound Etiology: neuropathic ulcer to right heel; venous stasis ulcers to BLE with mixed disease; non-healing surgical incision to right TMA     Patient will follow up with me in 4 weeks for reevaluation Can see Dr. Spears in 3 weeks as scheduled They were instructed to call the clinic sooner with any signs or symptoms of infection or any further questions/concerns. Answered all questions.          Heather Robertson DNP, RN, CNP, CWOCN, CFCN, CLT  [facility]   [phone]        This note was electronically signed by Heather Robertson NP

## 2023-11-15 ENCOUNTER — DOCUMENTATION ONLY (OUTPATIENT)
Dept: OTHER | Facility: CLINIC | Age: 78
End: 2023-11-15
Payer: MEDICARE

## 2023-11-17 ENCOUNTER — TRANSITIONAL CARE UNIT VISIT (OUTPATIENT)
Dept: GERIATRICS | Facility: CLINIC | Age: 78
End: 2023-11-17
Payer: MEDICARE

## 2023-11-17 VITALS
HEIGHT: 70 IN | SYSTOLIC BLOOD PRESSURE: 124 MMHG | DIASTOLIC BLOOD PRESSURE: 60 MMHG | BODY MASS INDEX: 26.48 KG/M2 | HEART RATE: 61 BPM | TEMPERATURE: 97.6 F | OXYGEN SATURATION: 94 % | WEIGHT: 185 LBS | RESPIRATION RATE: 20 BRPM

## 2023-11-17 DIAGNOSIS — I87.2 VENOUS STASIS DERMATITIS OF BOTH LOWER EXTREMITIES: ICD-10-CM

## 2023-11-17 DIAGNOSIS — I73.9 PERIPHERAL VASCULAR DISEASE (H): Primary | ICD-10-CM

## 2023-11-17 DIAGNOSIS — E11.42 POLYNEUROPATHY DUE TO TYPE 2 DIABETES MELLITUS (H): ICD-10-CM

## 2023-11-17 DIAGNOSIS — Z89.432 STATUS POST AMPUTATION OF LEFT FOOT (H): ICD-10-CM

## 2023-11-17 DIAGNOSIS — L01.00 IMPETIGO: ICD-10-CM

## 2023-11-17 PROBLEM — D69.2 OTHER NONTHROMBOCYTOPENIC PURPURA (H): Status: ACTIVE | Noted: 2022-02-14

## 2023-11-17 PROBLEM — G63 POLYNEUROPATHY IN DISEASES CLASSIFIED ELSEWHERE (H): Status: ACTIVE | Noted: 2022-05-09

## 2023-11-17 PROBLEM — D69.2 OTHER NONTHROMBOCYTOPENIC PURPURA (H): Status: RESOLVED | Noted: 2022-02-14 | Resolved: 2023-11-17

## 2023-11-17 PROCEDURE — 99309 SBSQ NF CARE MODERATE MDM 30: CPT | Performed by: NURSE PRACTITIONER

## 2023-11-17 RX ORDER — NALOXONE HYDROCHLORIDE 0.4 MG/ML
.1-.4 INJECTION, SOLUTION INTRAMUSCULAR; INTRAVENOUS; SUBCUTANEOUS
COMMUNITY

## 2023-11-17 RX ORDER — CETIRIZINE HYDROCHLORIDE 5 MG/1
5 TABLET ORAL DAILY
COMMUNITY

## 2023-11-17 NOTE — PROGRESS NOTES
[facility]  Episodic/Acute/Follow-Up  [place] MRN: [id number]. Place of Service where encounter took place:  [facility] ([facility]) [[id number]]   Chief Complaint   Patient presents with    Nursing Home Acute    HPI: Ever Crane  is a 78 year old (1945), who is being seen today for an episodic care visit. Today's concern is:    Ever seen today on routine follow-up as he continues to rehab in [place].  Last week, his bilateral lower extremity treatments were changed by the vascular wound care team, and we are following up today.  Today, Ever states he has a little bit of soreness below his nose, because he had a runny nose for a few days (which is now resolved.  His eyes are no longer bothering him, though do note redness to his left eye is ongoing.  He denies any vision changes, fever, new swelling, shortness of breath.    Past Medical and Surgical History reviewed in Epic today.  MEDICATIONS:  Current Outpatient Medications   Medication Sig Dispense Refill    acetaminophen (TYLENOL) 325 MG tablet 1000 mg BID scheduled and 650 mg bid prn      albuterol (PROAIR HFA/PROVENTIL HFA/VENTOLIN HFA) 108 (90 Base) MCG/ACT inhaler Inhale 2 puffs into the lungs 2 times daily as needed      cetirizine (ZYRTEC) 5 MG tablet Take 5 mg by mouth daily      clopidogrel (PLAVIX) 75 MG tablet Take 75 mg by mouth daily      clotrimazole-betamethasone (LOTRISONE) 1-0.05 % external cream Apply topically every 48 hours Apply to legs every other day 45 g 3    dulaglutide (TRULICITY) 0.75 MG/0.5ML pen Inject 1.5 mg Subcutaneous every 7 days      furosemide (LASIX) 40 MG tablet Take 40 mg by mouth daily      gabapentin (NEURONTIN) 100 MG capsule Take 200 mg by mouth 2 times daily      glimepiride (AMARYL) 1 MG tablet Take 1 mg by mouth every morning (before breakfast)      hypromellose (ARTIFICIAL TEARS) 0.5 % SOLN ophthalmic solution Place 1 drop Into the left eye every hour as needed for dry eyes      ketoconazole  "(NIZORAL) 2 % external shampoo Apply topically twice a week Mon and Fri.      losartan (COZAAR) 25 MG tablet Take 12.5 mg by mouth daily      mupirocin (BACTROBAN) 2 % external cream Apply topically every 48 hours Apply to BLE every other day 30 g 3    naloxone (NARCAN) 0.4 MG/ML injection Inject 0.1-0.4 mg into the muscle      omeprazole (PRILOSEC) 20 MG CR capsule Take 20 mg by mouth daily      rivaroxaban ANTICOAGULANT (XARELTO) 15 MG TABS tablet Take 20 mg by mouth daily      simvastatin (ZOCOR) 40 MG tablet Take 40 mg by mouth At Bedtime      spironolactone (ALDACTONE) 25 MG tablet Take 25 mg by mouth daily      tamsulosin (FLOMAX) 0.4 MG capsule Take 0.4 mg by mouth daily       Objective: /60   Pulse 61   Temp 97.6  F (36.4  C)   Resp 20   Ht 1.778 m (5' 10\")   Wt 83.9 kg (185 lb)   SpO2 94%   BMI 26.54 kg/m    Exam:  GENERAL APPEARANCE: Alert, in no distress, cooperative.   ENT: Impetigo noted below nostrils, left lower lid ectropion also noted.   RESP: Respiratory effort good, no respiratory distress, On RA.   CV: Edema 1-2+ BLE. Peripheral pulses are 2+.  Skin:    PSYCH: Insight, judgement, and memory are impaired at baseline, affect and mood are happy/engaged.    Labs: Labs done in facility are in EPIC. Please refer to them using [facility] Everywhere.    ASSESSMENT/PLAN:  Peripheral vascular disease (H24)  Venous stasis dermatitis of both lower extremities  Polyneuropathy due to type 2 diabetes mellitus (H)  Status post amputation of left foot (H)  Impetigo  Acute on chronic. Ongoing.  Noting ongoing PVD with venous stasis.  Treatment started by Dr. Robertson appear to be quite effective with less redness, reduction in edema, minimal weeping.  Nursing has been diligent about skin care/debridement.  This should be continued.  Diabetes at baseline with polyneuropathy, and this is likely why patient has no pain in lower extremities.  Noting ectropion to left eye, previous use of erythromycin and " eyedrops.  Is no longer bothersome but ectropion remains.  Noting mild impetigo present in mustache right below nostrils.  Will start Mupircin as noted below and recommend diligent hygiene.  Follow up w/in 1-2 weeks or as needed.    Orders:  Mupircin 2% ointment, apply below nostrils/above cupid's bow TID x 5 days. Dx: impetigo.     Electronically signed by:  KEVIN Wen CNP DNP

## 2023-11-17 NOTE — LETTER
11/17/2023        RE: Ever Crane  [address]  [address]  [place] [address]        [facility] G[facility]  Episodic/Acute/Follow-Up  [place] MRN: [id number]. Place of Service where encounter took place:  [facility] ([place]) [4002]   Chief Complaint   Patient presents with     Nursing Home Acute    HPI: Ever Crane  is a 78 year old (1945), who is being seen today for an episodic care visit. Today's concern is:    Ever seen today on routine follow-up as [place].  Last week, his bilateral lower extremity treatments were changed by the vascular wound care team, and we are following up today.  Today, Ever states he has a little bit of soreness below his nose, because he had a runny nose for a few days (which is now resolved.  His eyes are no longer bothering him, though do note redness to his left eye is ongoing.  He denies any vision changes, fever, new swelling, shortness of breath.    Past Medical and Surgical History reviewed in Epic today.  MEDICATIONS:  Current Outpatient Medications   Medication Sig Dispense Refill     acetaminophen (TYLENOL) 325 MG tablet 1000 mg BID scheduled and 650 mg bid prn       albuterol (PROAIR HFA/PROVENTIL HFA/VENTOLIN HFA) 108 (90 Base) MCG/ACT inhaler Inhale 2 puffs into the lungs 2 times daily as needed       cetirizine (ZYRTEC) 5 MG tablet Take 5 mg by mouth daily       clopidogrel (PLAVIX) 75 MG tablet Take 75 mg by mouth daily       clotrimazole-betamethasone (LOTRISONE) 1-0.05 % external cream Apply topically every 48 hours Apply to legs every other day 45 g 3     dulaglutide (TRULICITY) 0.75 MG/0.5ML pen Inject 1.5 mg Subcutaneous every 7 days       furosemide (LASIX) 40 MG tablet Take 40 mg by mouth daily       gabapentin (NEURONTIN) 100 MG capsule Take 200 mg by mouth 2 times daily       glimepiride (AMARYL) 1 MG tablet Take 1 mg by mouth every morning (before breakfast)       hypromellose (ARTIFICIAL TEARS) 0.5 %  "SOLN ophthalmic solution Place 1 drop Into the left eye every hour as needed for dry eyes       ketoconazole (NIZORAL) 2 % external shampoo Apply topically twice a week Mon and Fri.       losartan (COZAAR) 25 MG tablet Take 12.5 mg by mouth daily       mupirocin (BACTROBAN) 2 % external cream Apply topically every 48 hours Apply to BLE every other day 30 g 3     naloxone (NARCAN) 0.4 MG/ML injection Inject 0.1-0.4 mg into the muscle       omeprazole (PRILOSEC) 20 MG CR capsule Take 20 mg by mouth daily       rivaroxaban ANTICOAGULANT (XARELTO) 15 MG TABS tablet Take 20 mg by mouth daily       simvastatin (ZOCOR) 40 MG tablet Take 40 mg by mouth At Bedtime       spironolactone (ALDACTONE) 25 MG tablet Take 25 mg by mouth daily       tamsulosin (FLOMAX) 0.4 MG capsule Take 0.4 mg by mouth daily       Objective: /60   Pulse 61   Temp 97.6  F (36.4  C)   Resp 20   Ht 1.778 m (5' 10\")   Wt 83.9 kg (185 lb)   SpO2 94%   BMI 26.54 kg/m    Exam:  GENERAL APPEARANCE: Alert, in no distress, cooperative.   ENT: Impetigo noted below nostrils, left lower lid ectropion also noted.   RESP: Respiratory effort good, no respiratory distress, On RA.   CV: Edema 1-2+ BLE. Peripheral pulses are 2+.  Skin:    PSYCH: Insight, judgement, and memory are impaired at baseline, affect and mood are happy/engaged.    Labs: Labs done in facility are in EPIC. Please refer to them using [facility]Care Everywhere.    ASSESSMENT/PLAN:  Peripheral vascular disease (H24)  Venous stasis dermatitis of both lower extremities  Polyneuropathy due to type 2 diabetes mellitus (H)  Status post amputation of left foot (H)  Impetigo  Acute on chronic. Ongoing.  Noting ongoing PVD with venous stasis.  Treatment started by Dr. Robertson appear to be quite effective with less redness, reduction in edema, minimal weeping.  Nursing has been diligent about skin care/debridement.  This should be continued.  Diabetes at baseline with polyneuropathy, and this is " likely why patient has no pain in lower extremities.  Noting ectropion to left eye, previous use of erythromycin and eyedrops.  Is no longer bothersome but ectropion remains.  Noting mild impetigo present in mustache right below nostrils.  Will start Mupircin as noted below and recommend diligent hygiene.  Follow up w/in 1-2 weeks or as needed.    Orders:  Mupircin 2% ointment, apply below nostrils/above cupid's bow TID x 5 days. Dx: impetigo.     Electronically signed by:  Dr. Yanna Dozier, APRN CNP DNP      Sincerely,        KEVIN Bansal CNP

## 2023-11-29 ENCOUNTER — OFFICE VISIT (OUTPATIENT)
Dept: VASCULAR SURGERY | Facility: CLINIC | Age: 78
End: 2023-11-29
Attending: PODIATRIST
Payer: MEDICARE

## 2023-11-29 ENCOUNTER — ANCILLARY PROCEDURE (OUTPATIENT)
Dept: VASCULAR ULTRASOUND | Facility: CLINIC | Age: 78
End: 2023-11-29
Attending: NURSE PRACTITIONER
Payer: MEDICARE

## 2023-11-29 VITALS
SYSTOLIC BLOOD PRESSURE: 121 MMHG | TEMPERATURE: 97.7 F | OXYGEN SATURATION: 94 % | RESPIRATION RATE: 18 BRPM | DIASTOLIC BLOOD PRESSURE: 59 MMHG | HEART RATE: 58 BPM

## 2023-11-29 DIAGNOSIS — L97.511 ULCER OF RIGHT FOOT LIMITED TO BREAKDOWN OF SKIN (H): ICD-10-CM

## 2023-11-29 DIAGNOSIS — L30.9 DERMATITIS: ICD-10-CM

## 2023-11-29 DIAGNOSIS — T81.329D DEHISCENCE OF INTERNAL SURGICAL WOUND, SUBSEQUENT ENCOUNTER: ICD-10-CM

## 2023-11-29 DIAGNOSIS — L89.613 PRESSURE ULCER OF RIGHT HEEL, STAGE 3 (H): Primary | ICD-10-CM

## 2023-11-29 DIAGNOSIS — I73.9 PAD (PERIPHERAL ARTERY DISEASE) (H): ICD-10-CM

## 2023-11-29 DIAGNOSIS — L89.623 PRESSURE ULCER OF LEFT HEEL, STAGE 3 (H): ICD-10-CM

## 2023-11-29 DIAGNOSIS — L89.613 PRESSURE ULCER OF RIGHT HEEL, STAGE 3 (H): ICD-10-CM

## 2023-11-29 DIAGNOSIS — Z22.322 MRSA COLONIZATION: ICD-10-CM

## 2023-11-29 DIAGNOSIS — I70.25 ATHEROSCLEROSIS OF NATIVE ARTERIES OF OTHER EXTREMITIES WITH ULCERATION (H): ICD-10-CM

## 2023-11-29 PROCEDURE — 93923 UPR/LXTR ART STDY 3+ LVLS: CPT

## 2023-11-29 PROCEDURE — 97597 DBRDMT OPN WND 1ST 20 CM/<: CPT | Performed by: PODIATRIST

## 2023-11-29 PROCEDURE — 11042 DBRDMT SUBQ TIS 1ST 20SQCM/<: CPT | Performed by: PODIATRIST

## 2023-11-29 NOTE — PATIENT INSTRUCTIONS
Important lnstructions      WEIGHT BEARING STATUS: You are to remain NON WEIGHT BEARING on your left and right foot. NON WEIGHT BEARING MEANS NO PRESSURE ON YOUR FOOT OR HEEL AT ANY TIME FOR ANY REASON!    2. OFFLOADING DEVICE: Must use a A WHEELCHAIR at all times! (do not use affected foot to push wheelchair)    3. STABILIZATION DEVICE: Use a  PREVALON  . You will need to WEAR THIS AT ALL TIMES EVEN WHILE IN BED.     4. ELEVATE: Elevating your leg means laying with your head on a pillow and your foot ABOVE YOUR HEART.     5. DO NOT MOVE YOUR FOOT.  There is a risk of worsening the wound or incision. To give yourself a higher chance of healing, please DO NOT swing foot back and forth and wiggle foot/toes especially when inside a stabilization device. Limited movement is allowable with therapy as recommended by the doctor.     6. TAKE A PROTEIN SHAKE TWICE A DAY.  (For ex: Boost, Ensure, Glucerna)      Dressing Change lnstructions        LEFT HEEL IS HEALED!!!!      - Dressing Application of  Endoform for Right foot and Right heel wounds:    1. Endoform should be cut to the size of the wound.  It should touch the edges of the ulcer. It is okay if it overlap the edges a small amount.  Then, moisten the Endoform with saline.(If it is easier for you, you can moisten it before laying it in the wound)    2. Cover the wound with Endoform, followed by dry gauze, and secure with roll gauze if needed.     3. Endoform is naturally used by the body over time so you may not find it in the wound when you change your dressing.  If you do find some, gently cleanse the wound with saline. Do not remove the remaining Endoform, which may appear as an off-white to max gel, just add Endoform on top.  Usually, more Endoform will need to be added every 5-7 days.     4. Change your top dressing every 1-2 days or as needed depending on drainage.    -Endoform is a collagen dressing created from ovine (sheep) fore-stomach tissue. The  collagen extracellular matrix transforms into a soft conforming sheet, which is naturally incorporated into the wound over time.  Collagen dressings are used to stimulate wound healing.   ,        It IS NOT ok to get your wound wet in the bath or shower    SEEK MEDICAL CARE IF:  You have an increase in swelling, pain, or redness around the wound.  You have an increase in the amount of pus coming from the wound.  There is a bad smell coming from the wound.  The wound appears to be worsening/enlarging  You have a fever greater than 101.5 F      It is ok to continue current wound care treatment/products for the next 2-3 days until new wound care supplies are ordered and arrive. If longer than this please contact our office at [phone].        We want to hear from you!   In the next few weeks, you should receive a call or email to complete a survey about your visit at [facility]. Please help us improve your appointment experience by letting us know how we did today. We strive to make your experience good and value any ways in which we could do better.      We value your input and suggestions.    Thank you for choosing the [facility]!

## 2023-11-29 NOTE — PROGRESS NOTES
FOOT AND ANKLE SURGERY/PODIATRY Progress Note      ASSESSMENT:   Ulceration lateral right foot into subcutaneous tissues  Ulceration medial right foot into epidermis/dermis  Stage 3 Pressure Ulceration right heel into subcutaneous tissues  Venous Stasis       TREATMENT:  -I discussed with the patient that the left heel ulceration appears to have resolved within top-cover.  I recommend he continue to use the Prevalon boots for offloading at all times including overnight.    -ABIs obtained today indicate adequate perfusion to both feet for wound healing.    -Prevalon boots bilateral feet at all times including overnight.  Nonweightbearing right foot.    -After discussion of risk factors, nursing staff removed dressing, cleansed wound and consent obtained 2% Lidocaine HCL jelly was applied, under clean conditions, the right heel and lateral right foot ulceration(s) were debrided using #15 blade scalpel.  Devitalized and nonviable tissue, along with any fibrin and slough, was removed to improve granulation tissue formation, stimulate wound healing, decrease overall bacteria load, disrupt biofilm formation and decrease edge senescence. Wound drainage was scant No. Total excisional debridement was 3 sq cm into the subcutaneous tissue with a depth of 0.2 cm.   Ulcers were improved afterwards and .  Measures were as noted on the flow sheet. Collagen with a gauze dresssing was applied. He will continue to apply Collagen with a gauze dresssing as directed.    -After discussion of risk factors, nursing staff removed dressing, cleansed wound and consent obtained 2% Lidocaine HCL jelly was applied, under clean conditions, the medial right foot ulceration(s) were debrided using #15 blade scalpel.  Devitalized and nonviable tissue, along with any fibrin and slough, was removed to improve granulation tissue formation, stimulate wound healing, decrease overall bacteria load, disrupt biofilm formation and decrease edge  senescence. Wound drainage was scant No. Total excisional debridement was 3.2 sq cm from the epidermis/dermis area with a depth of 0.1 cm.   Ulcers were improved afterwards and .  Measures were as noted on the flow sheet. Collagen with a gauze dresssing was applied. He will continue to apply Collagen with a gauze dresssing as directed.    -Discussed with Dr. Robertson's nursing staff that the patient will continue to follow with her for venous stasis concerns and also for right foot ulcerations.  I am happy to see this patient in the future if I can be of assistance.    Miguelangel Spears DPM  [facility] [place]      HPI: Ever Crane was seen again today for bilateral heel ulcerations and right foot ulcers.  He is try to remain nonweightbearing on the right foot but admits applying pressure on a daily basis.  Patient sister is present for today's visit.  Patient did see Dr. Robertson and was given recommendations for venous stasis in bilateral lower extremities.    Past Medical History:   Diagnosis Date    A-fib (H)     Acute diastolic heart failure (H) 06/07/2022    Acute posthemorrhagic anemia 05/17/2022    MESHA (acute kidney injury) (H24)     Anemia     Atopic keratoconjunctivitis     Atrial fibrillation (H)     Brian Moe: 8/2011 Cardioversion; CHADS2 VASC = 5; he is on warfarin and sotalol     Atrial flutter (H)     Mcgarry's esophagus 01/01/2012    per note of Dr. Tavo Mike of [facility]    Bilateral lower leg cellulitis 08/31/2018    BPH (benign prostatic hyperplasia)     Candidiasis of perineum 01/03/2018    Carotid stenosis     Carotid stenosis, asymptomatic, right     Cellulitis     CHF (congestive heart failure) (H)     Cholecystitis 10/14/2019    Cholecystitis, acute 08/18/2019    Chronic systolic heart failure (H)     Chronic venous stasis dermatitis     Closed nondisplaced intertrochanteric fracture of left femur with routine healing, subsequent encounter 05/18/2022    Clostridium difficile  colitis     Contact with and (suspected) exposure to covid-19 12/13/2021    COPD (chronic obstructive pulmonary disease) (H)     Coronary artery disease due to lipid rich plaque 2000    CABG x2    Coronary atherosclerosis     Diabetes (H)     Diabetic ulcer of both feet (H) 10/31/2017    Diabetic ulcer of toe of right foot associated with diabetes mellitus due to underlying condition, limited to breakdown of skin (H) 01/05/2023    Diabetic ulcer of toe of right foot associated with diabetes mellitus due to underlying condition, with necrosis of bone (H) 01/05/2023    Dyslexia     Dyslipidemia, goal LDL below 70 2000    Epistaxis     Essential hypertension     Gangrene of left foot (H)     GERD (gastroesophageal reflux disease)     HLD (hyperlipidemia)     HTN (hypertension)     Hyponatremia     Ischemic heart disease     Lymphedema     Mild cognitive impairment     MRSA (methicillin resistant Staphylococcus aureus)     Neuropathy     Non-STEMI (non-ST elevated myocardial infarction) (H)     Occlusion and stenosis of right carotid artery     Osteoarthrosis     Osteomyelitis of ankle and foot (H)     Other atopic dermatitis     PAD (peripheral artery disease) (H24)     Peripheral vascular disease (H24)     Pneumonia 06/26/2018    Polyneuropathy due to type 2 diabetes mellitus (H)     Pressure ulcer, heel     Bilateral    Renal insufficiency     Type 2 diabetes mellitus, without long-term current use of insulin (H)     Ulcer of right foot (H)     Unable to function independently 11/13/2017       Past Surgical History:   Procedure Laterality Date    AMPUTATE FOOT Left 11/05/2017    Procedure: LEFT TRANSMETATARSAL AMPUTATION;  Surgeon: Ever Wick MD;  Location: [facility] [place];  Service:     AMPUTATE FOOT Right 4/27/2023    Procedure: Transmetatarsal amputation right foot;  Surgeon: Miguelangel Spears DPM;  Location: [facility] [place]    AMPUTATE FOOT Right 5/15/2023    Procedure: partial calcanectomy;   Surgeon: Miguelangel Spears DPM;  Location: [facility] [place]    BYPASS GRAFT ARTERY CORONARY N/A 03/06/2000    SVG to OM1, SVG to PDA    BYPASS GRAFT ARTERY CORONARY N/A 10/04/2018    redo CABG due to graft failure    CABG MEASURES GRP      CARDIOVERSION  08/25/2011    CV CORONARY ANGIOGRAM N/A 10/01/2018    Procedure: Coronary Angiogram;  Surgeon: Miki Mac MD;  Location: [facility];  Service:     INCISION AND DRAINAGE FOOT, COMBINED Right 5/15/2023    Procedure: INCISION AND DRAINAGE, right heel with,;  Surgeon: Miguelangel Spears DPM;  Location: [facility] [place] [facility]    INCISION AND DRAINAGE FOOT, COMBINED Bilateral 6/1/2023    Procedure: INCISION AND DRAINAGE, right foot with debridement of ulceration bilateral heels;  Surgeon: Miguelangel Spears DPM;  Location: [facility] [place]    INGUINAL HERNIA REPAIR Bilateral 1969    and 1979    IR EXTREMITY ANGIOGRAM BILATERAL  11/3/2017    IR LOWER EXTREMITY ANGIOGRAM LEFT  3/10/2023    IR LOWER EXTREMITY ANGIOGRAM RIGHT  1/24/2023    LAPAROSCOPIC CHOLECYSTECTOMY N/A 08/18/2019    Procedure: CHOLECYSTECTOMY, LAPAROSCOPIC;  Surgeon: Stewart Fountain MD;  Location: [facility];  Service: General    LENGTHEN TENDON ACHILLES Right 4/27/2023    Procedure: with Achilles tendon lengthening, debridement of right heel ulceration.;  Surgeon: Miguelangel Spears DPM;  Location: [facility] [place]       Allergies   Allergen Reactions    Lanolin      Other Reaction(s): Not available    Cranberry Extract Itching and Rash    Doxycycline Rash    Latex Rash    Penicillin V Rash     Reaction occurred as a child. Patient tolerated Zosyn 6/2018, Cefazolin 10/2018, and has also tolerated Augmentin.    Penicillins Rash     Reaction occurred as a child. Patient tolerated Zosyn 6/2018, Cefazolin 10/2018, and has also tolerated Augmentin.         Current Outpatient Medications:     acetaminophen (TYLENOL) 325 MG tablet, 1000 mg BID scheduled and 650 mg bid  prn, Disp: , Rfl:     albuterol (PROAIR HFA/PROVENTIL HFA/VENTOLIN HFA) 108 (90 Base) MCG/ACT inhaler, Inhale 2 puffs into the lungs 2 times daily as needed, Disp: , Rfl:     cetirizine (ZYRTEC) 5 MG tablet, Take 5 mg by mouth daily, Disp: , Rfl:     clopidogrel (PLAVIX) 75 MG tablet, Take 75 mg by mouth daily, Disp: , Rfl:     clotrimazole-betamethasone (LOTRISONE) 1-0.05 % external cream, Apply topically every 48 hours Apply to legs every other day, Disp: 45 g, Rfl: 3    dulaglutide (TRULICITY) 0.75 MG/0.5ML pen, Inject 1.5 mg Subcutaneous every 7 days, Disp: , Rfl:     furosemide (LASIX) 40 MG tablet, Take 40 mg by mouth daily, Disp: , Rfl:     gabapentin (NEURONTIN) 100 MG capsule, Take 200 mg by mouth 2 times daily, Disp: , Rfl:     glimepiride (AMARYL) 1 MG tablet, Take 1 mg by mouth every morning (before breakfast), Disp: , Rfl:     hypromellose (ARTIFICIAL TEARS) 0.5 % SOLN ophthalmic solution, Place 1 drop Into the left eye every hour as needed for dry eyes, Disp: , Rfl:     ketoconazole (NIZORAL) 2 % external shampoo, Apply topically twice a week Mon and Fri., Disp: , Rfl:     losartan (COZAAR) 25 MG tablet, Take 12.5 mg by mouth daily, Disp: , Rfl:     mupirocin (BACTROBAN) 2 % external cream, Apply topically every 48 hours Apply to BLE every other day, Disp: 30 g, Rfl: 3    naloxone (NARCAN) 0.4 MG/ML injection, Inject 0.1-0.4 mg into the muscle, Disp: , Rfl:     omeprazole (PRILOSEC) 20 MG CR capsule, Take 20 mg by mouth daily, Disp: , Rfl:     rivaroxaban ANTICOAGULANT (XARELTO) 15 MG TABS tablet, Take 20 mg by mouth daily, Disp: , Rfl:     simvastatin (ZOCOR) 40 MG tablet, Take 40 mg by mouth At Bedtime, Disp: , Rfl:     spironolactone (ALDACTONE) 25 MG tablet, Take 25 mg by mouth daily, Disp: , Rfl:     tamsulosin (FLOMAX) 0.4 MG capsule, Take 0.4 mg by mouth daily, Disp: , Rfl:     Review of Systems - 10 point Review of Systems is negative except for bilateral foot ulcers which is noted in  HPI.      OBJECTIVE:  /59   Pulse 58   Temp 97.7  F (36.5  C)   Resp 18   SpO2 94%   General appearance: Patient is alert and fully cooperative with history & exam.  No sign of distress is noted during the visit.    Vascular: Dorsalis pedis non-palpableBilateral.  Dermatologic:    VASC Wound Right heel (Active)   Pre Size Length 0.5 11/29/23 0900   Pre Size Width 1 11/29/23 0900   Pre Size Depth 0.2 11/29/23 0900   Pre Total Sq cm 0.5 11/29/23 0900   Post Size Length 1 11/10/23 1000   Post Size Width 1.8 11/10/23 1000   Post Size Depth 0.1 11/10/23 1000   Post Total Sq cm 1.8 11/10/23 1000   Description eschar 04/05/23 1000       VASC Wound Left heel (Active)   Pre Size Length 0 11/29/23 0900   Pre Size Width 0 11/29/23 0900   Pre Size Depth 0 11/29/23 0900   Pre Total Sq cm 0 11/29/23 0900   Post Size Length 7 01/13/23 1300   Post Size Width 6 01/13/23 1300   Post Size Depth 0.1 01/13/23 1300   Post Total Sq cm 42 01/13/23 1300   Description callous 11/29/23 0900       VASC Wound RIGHT FOOT TMA LATERAL (Active)   Pre Size Length 1 11/29/23 0900   Pre Size Width 3.5 11/29/23 0900   Pre Size Depth 0.2 11/29/23 0900   Pre Total Sq cm 3.5 11/29/23 0900       VASC Wound RIGHT FOOT TMA MEDIAL (Active)   Pre Size Length 0.8 11/29/23 0900   Pre Size Width 4 11/29/23 0900   Pre Size Depth 0.1 11/29/23 0900   Pre Total Sq cm 3.2 11/29/23 0900       Incision/Surgical Site 06/01/23 Bilateral Foot (Active)   Granular base right midfoot ulcerations and right heel ulceration.  No open lesions identified posterior aspect of the left heel.  No erythema bilateral lower extremities.  Neurologic: Diminished to light touch Bilateral.  Musculoskeletal: TMA bilateral.      Picture:

## 2023-12-05 NOTE — PROGRESS NOTES
[facility]  Chief Complaint   Patient presents with    [facility] Regulatory     [facility] Record Number:  [id number]  Place of Service where encounter took place:  [facility] ([facility]) [[id number]]    HPI:    Ever Crane  is 78 year old (1945), who is being seen today for a federally mandated E/M visit.  HPI information obtained from: facility chart records, facility staff, patient report and [facility] chart review.     UPDATES SINCE OUT LAST VISIT IN APRIL  ------------------------------------------------------------  11/29: saw vascular surgery team, EVELYNE obtained and showed adequate perfusion b/l. Continue wearing prevalon boots b/l all the times, NWB rt foot.     Today's concerns are:   - Resident seen and examined, chart reviewed and discussed with the nursing staff.   - Rt foot: denies pain  - T2D:  Appetite is fine  - report dull pain over LUQ lasts for a couple of second, for years. No aggravated factors     - DNP and RN have no concern today.   --------------------------------    MEDICATIONS:  Current Outpatient Medications   Medication Sig Dispense Refill    acetaminophen (TYLENOL) 325 MG tablet 1000 mg BID scheduled and 650 mg bid prn      albuterol (PROAIR HFA/PROVENTIL HFA/VENTOLIN HFA) 108 (90 Base) MCG/ACT inhaler Inhale 2 puffs into the lungs 2 times daily as needed      cetirizine (ZYRTEC) 5 MG tablet Take 5 mg by mouth daily      clopidogrel (PLAVIX) 75 MG tablet Take 75 mg by mouth daily      clotrimazole-betamethasone (LOTRISONE) 1-0.05 % external cream Apply topically every 48 hours Apply to legs every other day 45 g 3    dulaglutide (TRULICITY) 0.75 MG/0.5ML pen Inject 1.5 mg Subcutaneous every 7 days      furosemide (LASIX) 40 MG tablet Take 40 mg by mouth daily      gabapentin (NEURONTIN) 100 MG capsule Take 200 mg by mouth 2 times daily      glimepiride (AMARYL) 1 MG tablet Take 1 mg by mouth every morning (before breakfast)      hypromellose (ARTIFICIAL  "TEARS) 0.5 % SOLN ophthalmic solution Place 1 drop Into the left eye every hour as needed for dry eyes      ketoconazole (NIZORAL) 2 % external shampoo Apply topically twice a week Mon and Fri.      losartan (COZAAR) 25 MG tablet Take 12.5 mg by mouth daily      mupirocin (BACTROBAN) 2 % external cream Apply topically every 48 hours Apply to BLE every other day 30 g 3    naloxone (NARCAN) 0.4 MG/ML injection Inject 0.1-0.4 mg into the muscle      omeprazole (PRILOSEC) 20 MG CR capsule Take 20 mg by mouth daily      rivaroxaban ANTICOAGULANT (XARELTO) 15 MG TABS tablet Take 20 mg by mouth daily      simvastatin (ZOCOR) 40 MG tablet Take 40 mg by mouth At Bedtime      spironolactone (ALDACTONE) 25 MG tablet Take 25 mg by mouth daily      tamsulosin (FLOMAX) 0.4 MG capsule Take 0.4 mg by mouth daily       Case Management:  I have reviewed the care plan and MDS and do agree with the plan. Patient's desire to return to the community is present, but is not able due to care needs . Information reviewed:  Medications, vital signs, orders, and nursing notes.    ROS: 4 point ROS including Respiratory, CV, GI and , other than that noted in the HPI,  is negative    Vitals:  BP (!) 147/62   Pulse 60   Temp 97.5  F (36.4  C)   Resp 18   Ht 1.778 m (5' 10\")   Wt 84.7 kg (186 lb 11.2 oz)   SpO2 92%   BMI 26.79 kg/m    Body mass index is 26.79 kg/m .  Exam:  GENERAL APPEARANCE:  in no distress,   RESP:  Unlabored breathing. CTA b/l.   CV:  S1S2 audible, regular HR, no murmur appreciated.   ABDOMEN:  soft, NT/ND, tympanic on percussion over epigastric, and LUQ. BS audible.   M/S:   amputated  distal feet  SKIN:  Feet surgically dressed.   NEURO:   No NFD appreciated on observation.   PSYCH:  affect and mood normal    Lab/Diagnostic data:  Reviewed in the chart and EHR.          ASSESSMENT/PLAN  --------------------------  (E11.59,  Z79.4) Type 2 diabetes mellitus with other circulatory complication, with long-term current " "use of insulin (H)  (primary encounter diagnosis)  (E11.42) Polyneuropathy due to type 2 diabetes mellitus (H)   A1C 6.6 05/24/2023    A1C 6.7 05/15/2023   - controlled. On Trulicity.      (I50.30) Heart failure with preserved ejection fraction, NYHA class II (H)  (I48.19) Persistent atrial fibrillation (H)  (I10) Essential hypertension  - cardiac wise compensated.   - CVR, not on rate controlled.   - on Xarelto  for cva ppx.     (J44.9) Chronic obstructive pulmonary disease, unspecified COPD type (H)  - pulmonary wise compensated    (I73.9) PAD (peripheral artery disease) (H)  (Z98.890) S/P foot surgery, right  4/27: S/P Transmetatarsal amputation of right foot, Rt  Achillis tendon lengthening, excisional debridement ulceration rt heel. Cx grew MSSA  5/15: Rt calcaneus I &D, Cx grew enterobacter, enterococcus  6/1/23:  debridement wound 1st metatarsal.   7/7: wound debrided and wound vac started.   8/2: ID visit, continue  dapto through 8/4. zosyn completed on 7/13    Non specified abd pain:  - query 2/2 to flatulence. Drinks plenty of carbonated water \"pops'        Electronically signed by:  Isbael Stephens MD  "

## 2023-12-06 VITALS
BODY MASS INDEX: 26.73 KG/M2 | WEIGHT: 186.7 LBS | TEMPERATURE: 97.5 F | SYSTOLIC BLOOD PRESSURE: 147 MMHG | HEART RATE: 60 BPM | OXYGEN SATURATION: 92 % | HEIGHT: 70 IN | RESPIRATION RATE: 18 BRPM | DIASTOLIC BLOOD PRESSURE: 62 MMHG

## 2023-12-07 ENCOUNTER — TRANSITIONAL CARE UNIT VISIT (OUTPATIENT)
Dept: GERIATRICS | Facility: CLINIC | Age: 78
End: 2023-12-07
Payer: MEDICARE

## 2023-12-07 DIAGNOSIS — J44.9 CHRONIC OBSTRUCTIVE PULMONARY DISEASE, UNSPECIFIED COPD TYPE (H): ICD-10-CM

## 2023-12-07 DIAGNOSIS — I10 ESSENTIAL HYPERTENSION: ICD-10-CM

## 2023-12-07 DIAGNOSIS — Z79.4 TYPE 2 DIABETES MELLITUS WITH OTHER CIRCULATORY COMPLICATION, WITH LONG-TERM CURRENT USE OF INSULIN (H): Primary | ICD-10-CM

## 2023-12-07 DIAGNOSIS — E11.59 TYPE 2 DIABETES MELLITUS WITH OTHER CIRCULATORY COMPLICATION, WITH LONG-TERM CURRENT USE OF INSULIN (H): Primary | ICD-10-CM

## 2023-12-07 DIAGNOSIS — Z98.890 S/P FOOT SURGERY, RIGHT: ICD-10-CM

## 2023-12-07 DIAGNOSIS — I73.9 PAD (PERIPHERAL ARTERY DISEASE) (H): ICD-10-CM

## 2023-12-07 DIAGNOSIS — I48.19 PERSISTENT ATRIAL FIBRILLATION (H): ICD-10-CM

## 2023-12-07 DIAGNOSIS — R10.9 NONSPECIFIC ABDOMINAL PAIN: ICD-10-CM

## 2023-12-07 DIAGNOSIS — I50.30 HEART FAILURE WITH PRESERVED EJECTION FRACTION, NYHA CLASS II (H): ICD-10-CM

## 2023-12-07 PROCEDURE — 99309 SBSQ NF CARE MODERATE MDM 30: CPT | Performed by: FAMILY MEDICINE

## 2023-12-07 NOTE — LETTER
12/7/2023        RE: Ever Crane  [address]  [address]  [place] [address]        [facility]  Chief Complaint   Patient presents with     [employer] Regulatory     [facility] Record Number:  [id number]  Place of Service where encounter took place:  [facility] ([facility]) [[id number]]    HPI:    Ever Crane  is 78 year old (1945), who is being seen today for a federally mandated E/M visit.  HPI information obtained from: facility chart records, facility staff, patient report and [facility] chart review.     UPDATES SINCE OUT LAST VISIT IN APRIL  ------------------------------------------------------------  11/29: saw vascular surgery team, EVELYNE obtained and showed adequate perfusion b/l. Continue wearing prevalon boots b/l all the times, NWB rt foot.     Today's concerns are:   - Resident seen and examined, chart reviewed and discussed with the nursing staff.   - Rt foot: denies pain  - T2D:  Appetite is fine  - report dull pain over LUQ lasts for a couple of second, for years. No aggravated factors     - DNP and RN have no concern today.   --------------------------------    MEDICATIONS:  Current Outpatient Medications   Medication Sig Dispense Refill     acetaminophen (TYLENOL) 325 MG tablet 1000 mg BID scheduled and 650 mg bid prn       albuterol (PROAIR HFA/PROVENTIL HFA/VENTOLIN HFA) 108 (90 Base) MCG/ACT inhaler Inhale 2 puffs into the lungs 2 times daily as needed       cetirizine (ZYRTEC) 5 MG tablet Take 5 mg by mouth daily       clopidogrel (PLAVIX) 75 MG tablet Take 75 mg by mouth daily       clotrimazole-betamethasone (LOTRISONE) 1-0.05 % external cream Apply topically every 48 hours Apply to legs every other day 45 g 3     dulaglutide (TRULICITY) 0.75 MG/0.5ML pen Inject 1.5 mg Subcutaneous every 7 days       furosemide (LASIX) 40 MG tablet Take 40 mg by mouth daily       gabapentin (NEURONTIN) 100 MG capsule Take 200 mg by mouth 2 times daily        "glimepiride (AMARYL) 1 MG tablet Take 1 mg by mouth every morning (before breakfast)       hypromellose (ARTIFICIAL TEARS) 0.5 % SOLN ophthalmic solution Place 1 drop Into the left eye every hour as needed for dry eyes       ketoconazole (NIZORAL) 2 % external shampoo Apply topically twice a week Mon and Fri.       losartan (COZAAR) 25 MG tablet Take 12.5 mg by mouth daily       mupirocin (BACTROBAN) 2 % external cream Apply topically every 48 hours Apply to BLE every other day 30 g 3     naloxone (NARCAN) 0.4 MG/ML injection Inject 0.1-0.4 mg into the muscle       omeprazole (PRILOSEC) 20 MG CR capsule Take 20 mg by mouth daily       rivaroxaban ANTICOAGULANT (XARELTO) 15 MG TABS tablet Take 20 mg by mouth daily       simvastatin (ZOCOR) 40 MG tablet Take 40 mg by mouth At Bedtime       spironolactone (ALDACTONE) 25 MG tablet Take 25 mg by mouth daily       tamsulosin (FLOMAX) 0.4 MG capsule Take 0.4 mg by mouth daily       Case Management:  I have reviewed the care plan and MDS and do agree with the plan. Patient's desire to return to the community is present, but is not able due to care needs . Information reviewed:  Medications, vital signs, orders, and nursing notes.    ROS: 4 point ROS including Respiratory, CV, GI and , other than that noted in the HPI,  is negative    Vitals:  BP (!) 147/62   Pulse 60   Temp 97.5  F (36.4  C)   Resp 18   Ht 1.778 m (5' 10\")   Wt 84.7 kg (186 lb 11.2 oz)   SpO2 92%   BMI 26.79 kg/m    Body mass index is 26.79 kg/m .  Exam:  GENERAL APPEARANCE:  in no distress,   RESP:  Unlabored breathing. CTA b/l.   CV:  S1S2 audible, regular HR, no murmur appreciated.   ABDOMEN:  soft, NT/ND, tympanic on percussion over epigastric, and LUQ. BS audible.   M/S:   amputated  distal feet  SKIN:  Feet surgically dressed.   NEURO:   No NFD appreciated on observation.   PSYCH:  affect and mood normal    Lab/Diagnostic data:  Reviewed in the chart and EHR.  " "        ASSESSMENT/PLAN  --------------------------  (E11.59,  Z79.4) Type 2 diabetes mellitus with other circulatory complication, with long-term current use of insulin (H)  (primary encounter diagnosis)  (E11.42) Polyneuropathy due to type 2 diabetes mellitus (H)   A1C 6.6 05/24/2023    A1C 6.7 05/15/2023   - controlled. On Trulicity.      (I50.30) Heart failure with preserved ejection fraction, NYHA class II (H)  (I48.19) Persistent atrial fibrillation (H)  (I10) Essential hypertension  - cardiac wise compensated.   - CVR, not on rate controlled.   - on Xarelto  for cva ppx.     (J44.9) Chronic obstructive pulmonary disease, unspecified COPD type (H)  - pulmonary wise compensated    (I73.9) PAD (peripheral artery disease) (H)  (Z98.890) S/P foot surgery, right  4/27: S/P Transmetatarsal amputation of right foot, Rt  Achillis tendon lengthening, excisional debridement ulceration rt heel. Cx grew MSSA  5/15: Rt calcaneus I &D, Cx grew enterobacter, enterococcus  6/1/23:  debridement wound 1st metatarsal.   7/7: wound debrided and wound vac started.   8/2: ID visit, continue  dapto through 8/4. zosyn completed on 7/13    Non specified abd pain:  - query 2/2 to flatulence. Drinks plenty of carbonated water \"pops'        Electronically signed by:  Isabel Stephens MD        Sincerely,        Isabel Stephens MD      "

## 2023-12-08 ENCOUNTER — OFFICE VISIT (OUTPATIENT)
Dept: VASCULAR SURGERY | Facility: CLINIC | Age: 78
End: 2023-12-08
Payer: MEDICARE

## 2023-12-08 VITALS
TEMPERATURE: 98.3 F | DIASTOLIC BLOOD PRESSURE: 77 MMHG | OXYGEN SATURATION: 96 % | SYSTOLIC BLOOD PRESSURE: 132 MMHG | HEART RATE: 68 BPM | RESPIRATION RATE: 18 BRPM

## 2023-12-08 DIAGNOSIS — I70.25 ATHEROSCLEROSIS OF NATIVE ARTERIES OF OTHER EXTREMITIES WITH ULCERATION (H): ICD-10-CM

## 2023-12-08 DIAGNOSIS — L97.511 ULCER OF RIGHT FOOT LIMITED TO BREAKDOWN OF SKIN (H): ICD-10-CM

## 2023-12-08 DIAGNOSIS — I73.9 PAD (PERIPHERAL ARTERY DISEASE) (H): ICD-10-CM

## 2023-12-08 DIAGNOSIS — L89.613 PRESSURE ULCER OF RIGHT HEEL, STAGE 3 (H): Primary | ICD-10-CM

## 2023-12-08 DIAGNOSIS — Z22.322 MRSA COLONIZATION: ICD-10-CM

## 2023-12-08 DIAGNOSIS — Z79.01 LONG TERM CURRENT USE OF ANTICOAGULANT THERAPY: ICD-10-CM

## 2023-12-08 DIAGNOSIS — Z89.432 STATUS POST AMPUTATION OF LEFT FOOT (H): ICD-10-CM

## 2023-12-08 DIAGNOSIS — T81.329D DEHISCENCE OF INTERNAL SURGICAL WOUND, SUBSEQUENT ENCOUNTER: ICD-10-CM

## 2023-12-08 DIAGNOSIS — L30.9 DERMATITIS: ICD-10-CM

## 2023-12-08 DIAGNOSIS — L89.623 PRESSURE ULCER OF LEFT HEEL, STAGE 3 (H): ICD-10-CM

## 2023-12-08 PROCEDURE — 11042 DBRDMT SUBQ TIS 1ST 20SQCM/<: CPT | Performed by: NURSE PRACTITIONER

## 2023-12-08 RX ORDER — MUPIROCIN 20 MG/G
OINTMENT TOPICAL
COMMUNITY
Start: 2023-11-19 | End: 2024-02-23

## 2023-12-08 ASSESSMENT — PAIN SCALES - GENERAL: PAINLEVEL: NO PAIN (0)

## 2023-12-08 NOTE — PATIENT INSTRUCTIONS
"    Needs to elevate in bed during the day 4 times per day for 40 minute increments    Wound care supplies were not ordered or needed today. Facility to order        Important lnstructions      WEIGHT BEARING STATUS: You are to remain NON WEIGHT BEARING on your left and right foot. NON WEIGHT BEARING MEANS NO PRESSURE ON YOUR FOOT OR HEEL AT ANY TIME FOR ANY REASON!    2. OFFLOADING DEVICE: Must use a A WHEELCHAIR at all times! (do not use affected foot to push wheelchair)    3. STABILIZATION DEVICE: Use a  PREVALON  . You will need to WEAR THIS AT ALL TIMES EVEN WHILE IN BED.     4. ELEVATE: Elevating your leg means laying with your head on a pillow and your foot ABOVE YOUR HEART.     5. DO NOT MOVE YOUR FOOT.  There is a risk of worsening the wound or incision. To give yourself a higher chance of healing, please DO NOT swing foot back and forth and wiggle foot/toes especially when inside a stabilization device. Limited movement is allowable with therapy as recommended by the doctor.     6. TAKE A PROTEIN SHAKE TWICE A DAY.  (For ex: Boost, Ensure, Glucerna)      Dressing Change lnstructions        LEFT HEEL IS HEALED!!!!        Wound Care Instructions    EVERY OTHER DAY and as needed, Cleanse your bilateral legs wound(s) with Dilute hibiclens 30cc in 500cc NS.    Pat Dry with non-sterile gauze    Apply Lotion to the intact skin surrounding your wound and other dry skin locations. Some good lotions include: Remedy Skin Repair Cream, Sarna, Vanicream or Cetaphil    Primary Dressing: Apply mupirocin cream on wounds into/onto the wounds    Apply hydrofera blue ready on all wounds    Secondary dressing: Cover with gauze    Wrap the legs with viscopaste; using spiral technique    Secure with non-sterile roll gauze (4\" x 75\" roll) and tape (1\" roll tape) as needed; avoid adhesive directly on the skin    Compression: tubular compression    It is not ok to get your wound wet in the bath or shower      If for some reason " you are not able to get your dressing(s) changed as outlined above (due to illness, lack of supplies, lack of help) please do the following: remove old, soiled dressings; wash the wounds with saline; pat dry; apply ABD pad or other absorbant pad and secure with rolled gauze; avoid tape directly on your skin; Call the clinic as soon as possible to let us know what the current issues are in receiving wound care [phone].      SEEK MEDICAL CARE IF:  You have an increase in swelling, pain, or redness around the wound.  You have an increase in the amount of pus coming from the wound.  There is a bad smell coming from the wound.  The wound appears to be worsening/enlarging  You have a fever greater than 101.5 F      It is ok to continue current wound care treatment/products for the next 2-3 days until new wound care supplies are ordered and arrive. If longer than this please contact our office at [phone].    If you have a 2 layer or 4 layer compression wrap on these are safe to have on for ONLY 7 days. If for some reason you are not able to get the wrap(s) changed (due to illness; lack of supplies, lack of help, lack of transportation) please do the following: unwrap the old 2 or 4 layer compression wrap; avoid using scissors as you could cut your skin and cause wounds; use tubular compression when available. Call to reschedule your home care or clinic visit appointment as soon as possible.    Please NOTE: if you are 15 minutes late to your clinic appointment you will have to be rescheduled. Please call our clinic as soon as possible if you know you will not be able to get to your appointment at [phone].    If you fail to show up to 3 scheduled clinic appointments you will be dismissed from our clinic.              We want to hear from you!  In the next few weeks, you should receive a call or email to complete a survey about your visit at [facility]. Please help us improve your appointment  experience by letting us know how we did today. We strive to make your experience good and value any ways in which we could do better.      We value your input and suggestions.    Thank you for choosing the [facility]!           It is recommended that you do not get your ulcer wet when showering.  Listed below are several ways of keeping it dry when you shower.     1. Wrap it with Press and Seal plastic wrap.  It can be found in the stores where the plastic wraps or tin foil is kept.               2.  Some people take a bath and hang their leg/foot out of the tub.                        3  Put your leg in a plastic bag and tape it on.           4. You can purchase a shower cover for casts at some pharmacies and through the Internet.            5. Take a Bed Bath or wash up at the sink     High Protein Foods  Chicken  -Chicken breast, 3.5oz.-30 grams protein  -Chicken thigh-10 grams(average size)  -Drumstick-11 grams  -Wing- 6 grams  -Chicken meat, cooked, 4 oz.  Beef  -Hamburger jacquelin, 4 oz-28 grams protein  -Steak, 6 oz-42 grams  -Most cuts of beef- 7 grams of protein per ounce  Fish  -Most fish fillets or steaks are about 22 grams of protein for 3 1/2 oz(100 grams) of cooked fish, or 6 grams per ounce  -Tuna, 6 oz can-40 grams of protein  Pork  -Pork chop, average-22 grams protein  -Pork loin or tenderloin, 4 oz.-29 grams  -Ham, 3oz serving- 19 grams  -Ground pork 3oz cooked-22 grams  -Nelson, 1 slice-3 grams  -Montrose-style nelson(black nelson), slice-5-6 grams  Eggs and Dairy  -Egg, large-7 grams  -Milk, 1 cup-8 grams  -Cottage cheese, 1/2 cup-15 grams  -Greek yogurt, 1 cup-usually 8-12 grams, check label  -Soft cheeses (Mozzarella, Brie, Camembert)- 6 grams  -Medium cheeses(cheddar, swiss)- 7 or 8 grams per oz  -Hard cheeses(parmesan)- 10 grams per oz  Beans  -Tofu, 1/2 cup 20 grams  -Tofu, 1 oz., 2.3 grams  -Soy milk, 1 cup-6-10 grams  -Most beans(black, vicente, lentils, etc.) about 7-10  grams protein per half cup of cooked beans  -soy beans, 1/2 cup cooked-14 grams  -Split peas, 1/2 cup cooked- 8 grams  Nuts and Seeds  -Peanut butter, 2 Tablespoons- 8 grams protein  -Almonds, 1/4 cup- 8 grams  -Peanuts, 1/4 cup-9 grams  -Cashews, 1/4 cup- 5 grams  -Pecans, 1/4 cup- 2.5 grams  -Sunflower seeds, 1/4 cup- 6 grams  -Pumpkin seeds, 1/4 cup-8 grams  -Flax seeds- 1/4 cup- 8 grams  Protein Supplements  -Ensure  -Boost  -Glucerna, if diabetic  When you have an open ulcer, your bodies protein needs are much higher, so it is recommended eat good sources of protein       Prevalon Boot          EZ Heelift Boot              Prafo Boot          Foam Ring

## 2023-12-08 NOTE — PROGRESS NOTES
Follow up Vascular Visit       Date of Service:12/08/23      Chief Complaint: BLE swelling; right TMA dehiscence; right heel fissure      Pt returns to [facility] with regards to their BLE swelling; right TMA dehiscence; right heel fissure.  They arrive today with sister. They are currently using endoform to the wounds. This is being done by staff at [facility]. They are using nothing for compression. They are feeling well today. Denies fevers, chills. No shortness of breath.     Allergies:   Allergies   Allergen Reactions    Lanolin      Other Reaction(s): Not available    Cranberry Extract Itching and Rash    Doxycycline Rash    Latex Rash    Penicillin V Rash     Reaction occurred as a child. Patient tolerated Zosyn 6/2018, Cefazolin 10/2018, and has also tolerated Augmentin.    Penicillins Rash     Reaction occurred as a child. Patient tolerated Zosyn 6/2018, Cefazolin 10/2018, and has also tolerated Augmentin.       Medications:   Current Outpatient Medications:     acetaminophen (TYLENOL) 325 MG tablet, 1000 mg BID scheduled and 650 mg bid prn, Disp: , Rfl:     albuterol (PROAIR HFA/PROVENTIL HFA/VENTOLIN HFA) 108 (90 Base) MCG/ACT inhaler, Inhale 2 puffs into the lungs 2 times daily as needed, Disp: , Rfl:     cetirizine (ZYRTEC) 5 MG tablet, Take 5 mg by mouth daily, Disp: , Rfl:     clopidogrel (PLAVIX) 75 MG tablet, Take 75 mg by mouth daily, Disp: , Rfl:     clotrimazole-betamethasone (LOTRISONE) 1-0.05 % external cream, Apply topically every 48 hours Apply to legs every other day, Disp: 45 g, Rfl: 3    dulaglutide (TRULICITY) 0.75 MG/0.5ML pen, Inject 1.5 mg Subcutaneous every 7 days, Disp: , Rfl:     furosemide (LASIX) 40 MG tablet, Take 40 mg by mouth daily, Disp: , Rfl:     gabapentin (NEURONTIN) 100 MG capsule, Take 200 mg by mouth 2 times daily, Disp: , Rfl:     glimepiride (AMARYL) 1 MG tablet, Take 1 mg by mouth every morning (before breakfast), Disp: , Rfl:     hypromellose  (ARTIFICIAL TEARS) 0.5 % SOLN ophthalmic solution, Place 1 drop Into the left eye every hour as needed for dry eyes, Disp: , Rfl:     ketoconazole (NIZORAL) 2 % external shampoo, Apply topically twice a week Mon and Fri., Disp: , Rfl:     losartan (COZAAR) 25 MG tablet, Take 12.5 mg by mouth daily, Disp: , Rfl:     mupirocin (BACTROBAN) 2 % external cream, Apply topically every 48 hours Apply to BLE every other day, Disp: 30 g, Rfl: 3    mupirocin (BACTROBAN) 2 % external ointment, , Disp: , Rfl:     naloxone (NARCAN) 0.4 MG/ML injection, Inject 0.1-0.4 mg into the muscle, Disp: , Rfl:     omeprazole (PRILOSEC) 20 MG CR capsule, Take 20 mg by mouth daily, Disp: , Rfl:     rivaroxaban ANTICOAGULANT (XARELTO) 15 MG TABS tablet, Take 20 mg by mouth daily, Disp: , Rfl:     simvastatin (ZOCOR) 40 MG tablet, Take 40 mg by mouth At Bedtime, Disp: , Rfl:     spironolactone (ALDACTONE) 25 MG tablet, Take 25 mg by mouth daily, Disp: , Rfl:     tamsulosin (FLOMAX) 0.4 MG capsule, Take 0.4 mg by mouth daily, Disp: , Rfl:     History:   Past Medical History:   Diagnosis Date    A-fib (H)     Acute diastolic heart failure (H) 06/07/2022    Acute posthemorrhagic anemia 05/17/2022    MESHA (acute kidney injury) (H24)     Anemia     Atopic keratoconjunctivitis     Atrial fibrillation (H)     Brian Moe: 8/2011 Cardioversion; CHADS2 VASC = 5; he is on warfarin and sotalol     Atrial flutter (H)     Mcgarry's esophagus 01/01/2012    per note of Dr. Tavo Mike of [facility]    Bilateral lower leg cellulitis 08/31/2018    BPH (benign prostatic hyperplasia)     Candidiasis of perineum 01/03/2018    Carotid stenosis     Carotid stenosis, asymptomatic, right     Cellulitis     CHF (congestive heart failure) (H)     Cholecystitis 10/14/2019    Cholecystitis, acute 08/18/2019    Chronic systolic heart failure (H)     Chronic venous stasis dermatitis     Closed nondisplaced intertrochanteric fracture of left femur with routine healing, subsequent  encounter 05/18/2022    Clostridium difficile colitis     Contact with and (suspected) exposure to covid-19 12/13/2021    COPD (chronic obstructive pulmonary disease) (H)     Coronary artery disease due to lipid rich plaque 2000    CABG x2    Coronary atherosclerosis     Diabetes (H)     Diabetic ulcer of both feet (H) 10/31/2017    Diabetic ulcer of toe of right foot associated with diabetes mellitus due to underlying condition, limited to breakdown of skin (H) 01/05/2023    Diabetic ulcer of toe of right foot associated with diabetes mellitus due to underlying condition, with necrosis of bone (H) 01/05/2023    Dyslexia     Dyslipidemia, goal LDL below 70 2000    Epistaxis     Essential hypertension     Gangrene of left foot (H)     GERD (gastroesophageal reflux disease)     HLD (hyperlipidemia)     HTN (hypertension)     Hyponatremia     Ischemic heart disease     Lymphedema     Mild cognitive impairment     MRSA (methicillin resistant Staphylococcus aureus)     Neuropathy     Non-STEMI (non-ST elevated myocardial infarction) (H)     Occlusion and stenosis of right carotid artery     Osteoarthrosis     Osteomyelitis of ankle and foot (H)     Other atopic dermatitis     PAD (peripheral artery disease) (H24)     Peripheral vascular disease (H24)     Pneumonia 06/26/2018    Polyneuropathy due to type 2 diabetes mellitus (H)     Pressure ulcer, heel     Bilateral    Renal insufficiency     Type 2 diabetes mellitus, without long-term current use of insulin (H)     Ulcer of right foot (H)     Unable to function independently 11/13/2017       Physical Exam:    /77   Pulse 68   Temp 98.3  F (36.8  C)   Resp 18   SpO2 96%     General:  Patient presents to clinic in no apparent distress.  Head: normocephalic atraumatic  Psychiatric:  Alert and oriented x3.   Respiratory: unlabored breathing; no cough  Integumentary:  Skin is uniformly warm, dry and pink.    Ulcer #1 Location: right TMA  Size: 12L x 1W x 0.1depth.   no sinus tract present, Wound base: initially with serous crust this was removed to reveal full thickness tissue loss underneath; red; viable  no undermining present. Ulcer is full thickness. There is moderate drainage. Periwound: no denudement, erythema, induration, maceration or warmth.      Bilateral heels with fissures    BLE swelling slightly improved    Scaling on legs improved    PICC Single Lumen Right Basilic (Active)   Number of days:        VASC Wound Right heel (Active)   Pre Size Length 0.5 12/08/23 1100   Pre Size Width 1 12/08/23 1100   Pre Size Depth 0.3 12/08/23 1100   Pre Total Sq cm 0.5 12/08/23 1100   Post Size Length 1 11/10/23 1000   Post Size Width 1.8 11/10/23 1000   Post Size Depth 0.1 11/10/23 1000   Post Total Sq cm 1.8 11/10/23 1000   Description eschar 04/05/23 1000   Number of days: 329       VASC Wound Left heel (Active)   Pre Size Length 1.3 12/08/23 1100   Pre Size Width 0.3 12/08/23 1100   Pre Size Depth 0 12/08/23 1100   Pre Total Sq cm 0.39 12/08/23 1100   Post Size Length 7 01/13/23 1300   Post Size Width 6 01/13/23 1300   Post Size Depth 0.1 01/13/23 1300   Post Total Sq cm 42 01/13/23 1300   Description callous 11/29/23 0900   Number of days: 329       VASC Wound RIGHT FOOT TMA LATERAL (Active)   Pre Size Length 1 11/29/23 0900   Pre Size Width 3.5 11/29/23 0900   Pre Size Depth 0.2 11/29/23 0900   Pre Total Sq cm 3.5 11/29/23 0900   Number of days: 9       VASC Wound RIGHT FOOT TMA MEDIAL (Active)   Pre Size Length 0.8 11/29/23 0900   Pre Size Width 4 11/29/23 0900   Pre Size Depth 0.1 11/29/23 0900   Pre Total Sq cm 3.2 11/29/23 0900   Number of days: 9       Incision/Surgical Site 06/01/23 Bilateral Foot (Active)   Number of days: 190            Circumferential volume measures:          11/10/2023    10:00 AM 12/8/2023    11:00 AM   Circumferential Measures   Right just above MTP 26 24   Right Ankle 35 26   Right Widest Calf 37 34   Left - just above MTP 26 22.5   Left Ankle  28 25.5   Left Widest Calf 34 32.5       Labs:    I personally reviewed the following lab results today and those on care everywhere    CRP Inflammation   Date Value Ref Range Status   06/29/2018 120.0 (H) 0.0 - 8.0 mg/L Final     CRP   Date Value Ref Range Status   01/03/2019 4.0 (H) 0.0 - 0.8 mg/dL Final      Sed Rate   Date Value Ref Range Status   06/27/2018 50 (H) 0 - 20 mm/h Final     Erythrocyte Sedimentation Rate   Date Value Ref Range Status   01/17/2023 48 (H) 0 - 15 mm/hr Final      Last Renal Panel:  Sodium   Date Value Ref Range Status   10/16/2023 142 135 - 145 mmol/L Final     Comment:     Reference intervals for this test were updated on 09/26/2023 to more accurately reflect our healthy population. There may be differences in the flagging of prior results with similar values performed with this method. Interpretation of those prior results can be made in the context of the updated reference intervals.    06/30/2018 139 133 - 144 mmol/L Final     Potassium   Date Value Ref Range Status   10/16/2023 4.4 3.4 - 5.3 mmol/L Final   01/24/2023 5.5 (H) 3.5 - 5.0 mmol/L Final   06/30/2018 4.1 3.4 - 5.3 mmol/L Final     Chloride   Date Value Ref Range Status   10/16/2023 105 98 - 107 mmol/L Final   01/24/2023 107 98 - 107 mmol/L Final   06/30/2018 108 94 - 109 mmol/L Final     Carbon Dioxide   Date Value Ref Range Status   06/30/2018 20 20 - 32 mmol/L Final     Carbon Dioxide (CO2)   Date Value Ref Range Status   10/16/2023 25 22 - 29 mmol/L Final   01/24/2023 21 (L) 22 - 31 mmol/L Final     Anion Gap   Date Value Ref Range Status   10/16/2023 12 7 - 15 mmol/L Final   01/24/2023 11 5 - 18 mmol/L Final   06/30/2018 11 3 - 14 mmol/L Final     Glucose   Date Value Ref Range Status   10/16/2023 94 70 - 99 mg/dL Final   01/24/2023 106 70 - 125 mg/dL Final   06/30/2018 162 (H) 70 - 99 mg/dL Final     GLUCOSE BY METER POCT   Date Value Ref Range Status   06/01/2023 117 (H) 70 - 99 mg/dL Final     Urea Nitrogen   Date  "Value Ref Range Status   10/16/2023 25.2 (H) 8.0 - 23.0 mg/dL Final   01/24/2023 22 8 - 28 mg/dL Final   06/30/2018 22 7 - 30 mg/dL Final     Creatinine   Date Value Ref Range Status   10/16/2023 1.21 (H) 0.67 - 1.17 mg/dL Final   06/30/2018 1.20 0.66 - 1.25 mg/dL Final     GFR Estimate   Date Value Ref Range Status   10/16/2023 61 >60 mL/min/1.73m2 Final   03/09/2021 46 (L) >60 mL/min/1.73m2 Final   06/30/2018 59 (L) >60 mL/min/1.7m2 Final     Comment:     Non  GFR Calc     Calcium   Date Value Ref Range Status   10/16/2023 9.7 8.8 - 10.2 mg/dL Final   06/30/2018 9.0 8.5 - 10.1 mg/dL Final     Phosphorus   Date Value Ref Range Status   08/19/2019 2.4 (L) 2.5 - 4.5 mg/dL Final     Albumin   Date Value Ref Range Status   08/18/2023 4.0 3.5 - 5.2 g/dL Final   10/21/2021 4.1 3.5 - 5.0 g/dL Final   06/29/2018 2.8 (L) 3.4 - 5.0 g/dL Final      Lab Results   Component Value Date    WBC 7.1 10/16/2023    WBC 7.3 06/30/2018     Lab Results   Component Value Date    RBC 4.49 10/16/2023    RBC 3.67 06/30/2018     Lab Results   Component Value Date    HGB 11.4 10/16/2023    HGB 10.5 06/30/2018     Lab Results   Component Value Date    HCT 36.1 10/16/2023    HCT 30.5 06/30/2018     No components found for: \"MCT\"  Lab Results   Component Value Date    MCV 80 10/16/2023    MCV 83 06/30/2018     Lab Results   Component Value Date    MCH 25.4 10/16/2023    MCH 28.6 06/30/2018     Lab Results   Component Value Date    MCHC 31.6 10/16/2023    MCHC 34.4 06/30/2018     Lab Results   Component Value Date    RDW 16.6 10/16/2023    RDW 13.7 06/30/2018     Lab Results   Component Value Date     10/16/2023     06/30/2018      Lab Results   Component Value Date    A1C 7.5 10/16/2023    A1C 6.6 05/24/2023    A1C 6.7 05/15/2023    A1C 7.0 07/12/2022    A1C 6.3 10/21/2021    A1C 7.7 06/26/2018    A1C 7.3 04/20/2018      TSH   Date Value Ref Range Status   07/12/2022 3.20 0.30 - 4.20 uIU/mL Final   03/09/2021 2.06 " "0.30 - 5.00 uIU/mL Final   06/26/2018 0.86 0.40 - 4.00 mU/L Final      No results found for: \"VITDT\"                Impression:  Encounter Diagnoses   Name Primary?    Pressure ulcer of right heel, stage 3 (H) Yes    Ulcer of right foot limited to breakdown of skin (H)     Pressure ulcer of left heel, stage 3 (H)     MRSA colonization     Atherosclerosis of native arteries of other extremities with ulceration (H)     PAD (peripheral artery disease) (H24)     Dermatitis     Status post amputation of left foot (H)     Long term current use of anticoagulant therapy     Dehiscence of internal surgical wound, subsequent encounter                                Are any of these ulcers new today: No; Location: na    Assessment/Plan:          1. Debridement: Nursing staff removed the old dressing and cleanse the wound(s) with specified solution. After discussion of risk factors and verbal consent was obtained 2% Lidocaine HCL jelly was applied, under clean conditions, the BLE  ulceration(s) were debrided using currette or #15 blade scalpel. Devitalized and nonviable tissue, along with any fibrin and slough, was removed to improve granulation tissue formation, stimulate wound healing, decrease overall bacteria load, disrupt biofilm formation and decrease edge senescence.  Total excisional debridement was 12.89 sq cm from the epidermis/dermis area and into the subcutaneous tissue with a depth of 0.1 cm.   Ulcers were improved afterwards and .  Measures were unchanged after debridement.       2.  Ulcer treatment: ulcer treatment will include irrigation and dressings to promote autolytic debridement which will include:will stop the endoform; I do not like how this is performing on the TMA site; will instead use dilute hibiclens; then mupirocin ointment; then hydrofera blue ready to the wounds; then viscopaste wraps to the legs. Change 2-3 times per week by staff at his facility  If for some reason the patient is not able " to get their dressing(s) changed as outlined above (due to illness, lack of supplies, lack of help) please do the following: remove old, soiled dressings; wash the ulcers with saline; pat dry; apply ABD pad or other absorbant pad and secure with rolled gauze; avoid tape directly on your skin; patient instructed to call the clinic as soon as possible to let us know what the current issues are in receiving ulcer care. Stable            3. Edema: checked with Vascular team on how much compression he can have; they states 10-20mmHG; will apply viscopaste and tubular compression encouraged elevation. The compression wraps were applied today in clinic.     If a 2 layer or 4 layer compression wrap is being used; these are safe to have on for ONLY 7 days. If for some reason the patient is not able to get the wrap(s) changed (due to illness; lack of supplies, lack of help, lack of transportation) please do the following: unwrap the old 2 or 4 layer compression wrap; avoid using scissors as you could cut your skin and cause ulcers; use tubular compression when available. Call to reschedule your home care or clinic visit appointment as soon as possible.  Stable            4. Nutrition: diabetic low sodium diet           5. Offloading: will need to wear prevalon boots most likely life long          6. Wound Etiology: surgical incision dehiscence; diabetic ulcers     Patient will follow up with me in 4 weeks for reevaluation. They were instructed to call the clinic sooner with any signs or symptoms of infection or any further questions/concerns. Answered all questions.          Heather Robertson DNP, RN, CNP, CWOCN, CFCN, CLT  [facility]   [phone]        This note was electronically signed by Heather Robertson NP

## 2023-12-08 NOTE — PROGRESS NOTES
Compression Applied to Bilateral  Tubigrip Size F

## 2023-12-12 ENCOUNTER — TRANSITIONAL CARE UNIT VISIT (OUTPATIENT)
Dept: GERIATRICS | Facility: CLINIC | Age: 78
End: 2023-12-12
Payer: MEDICARE

## 2023-12-12 VITALS
SYSTOLIC BLOOD PRESSURE: 155 MMHG | HEIGHT: 70 IN | RESPIRATION RATE: 17 BRPM | HEART RATE: 54 BPM | BODY MASS INDEX: 26.53 KG/M2 | TEMPERATURE: 97.8 F | OXYGEN SATURATION: 94 % | WEIGHT: 185.3 LBS | DIASTOLIC BLOOD PRESSURE: 63 MMHG

## 2023-12-12 DIAGNOSIS — I87.2 VENOUS STASIS DERMATITIS OF BOTH LOWER EXTREMITIES: ICD-10-CM

## 2023-12-12 DIAGNOSIS — L01.00 IMPETIGO: Primary | ICD-10-CM

## 2023-12-12 DIAGNOSIS — H57.89 EYE IRRITATION: ICD-10-CM

## 2023-12-12 DIAGNOSIS — I73.9 PERIPHERAL VASCULAR DISEASE (H): ICD-10-CM

## 2023-12-12 PROCEDURE — 99309 SBSQ NF CARE MODERATE MDM 30: CPT | Performed by: NURSE PRACTITIONER

## 2023-12-12 NOTE — LETTER
12/12/2023        RE: Ever Crane  [address]  [address]  [place] [address]        [facility] G[facility]IATRIC SERVICE  Episodic/Acute/Follow-Up  [place] MRN: [id number]. Place of Service where encounter took place[facility]  [facility] [place] [facility]) [[id number]]   Chief Complaint   Patient presents with     RECHECK    HPI: Ever Crane  is a 78 year old (1945), who is being seen today for an episodic care visit. Today's concern is:    Ever seen today on routine follow up as he continues to rehab in [place]. His impetigo cleared up well since we last talked and his eye is no longer bothering him. His legs are looking better, but he says both heels hurt a little since being debrided last week, he feels Tylenol controls this. He otherwise denies SOB, CP, cough/fever.     Past Medical and Surgical History reviewed in Epic today.  MEDICATIONS:  Current Outpatient Medications   Medication Sig Dispense Refill     acetaminophen (TYLENOL) 325 MG tablet 1000 mg BID scheduled and 650 mg bid prn       albuterol (PROAIR HFA/PROVENTIL HFA/VENTOLIN HFA) 108 (90 Base) MCG/ACT inhaler Inhale 2 puffs into the lungs 2 times daily as needed       cetirizine (ZYRTEC) 5 MG tablet Take 5 mg by mouth daily       clopidogrel (PLAVIX) 75 MG tablet Take 75 mg by mouth daily       clotrimazole-betamethasone (LOTRISONE) 1-0.05 % external cream Apply topically every 48 hours Apply to legs every other day 45 g 3     dulaglutide (TRULICITY) 0.75 MG/0.5ML pen Inject 1.5 mg Subcutaneous every 7 days       furosemide (LASIX) 40 MG tablet Take 40 mg by mouth daily       gabapentin (NEURONTIN) 100 MG capsule Take 200 mg by mouth 2 times daily       glimepiride (AMARYL) 1 MG tablet Take 1 mg by mouth every morning (before breakfast)       hypromellose (ARTIFICIAL TEARS) 0.5 % SOLN ophthalmic solution Place 1 drop Into the left eye every hour as needed for dry eyes       ketoconazole (NIZORAL) 2 % external shampoo Apply topically twice a week Mon and  "Fri.       losartan (COZAAR) 25 MG tablet Take 12.5 mg by mouth daily       mupirocin (BACTROBAN) 2 % external cream Apply topically every 48 hours Apply to BLE every other day 30 g 3     mupirocin (BACTROBAN) 2 % external ointment        naloxone (NARCAN) 0.4 MG/ML injection Inject 0.1-0.4 mg into the muscle       omeprazole (PRILOSEC) 20 MG CR capsule Take 20 mg by mouth daily       rivaroxaban ANTICOAGULANT (XARELTO) 15 MG TABS tablet Take 20 mg by mouth daily       simvastatin (ZOCOR) 40 MG tablet Take 40 mg by mouth At Bedtime       spironolactone (ALDACTONE) 25 MG tablet Take 25 mg by mouth daily       tamsulosin (FLOMAX) 0.4 MG capsule Take 0.4 mg by mouth daily       Objective: BP (!) 155/63   Pulse 54   Temp 97.8  F (36.6  C)   Resp 17   Ht 1.778 m (5' 10\")   Wt 84.1 kg (185 lb 4.8 oz)   SpO2 94%   BMI 26.59 kg/m    Exam:  GENERAL APPEARANCE: Alert, in no distress, cooperative.   RESP: Respiratory effort good, no respiratory distress, On RA.   CV: Edema trace+ BLE, ruborus color w/ improved skin.  PSYCH: Insight, judgement, and memory are impaired at baseline, affect and mood are happy/engaged.    Labs: Labs done in facility are in EPIC. Please refer to them using EPIC/Care Everywhere.    ASSESSMENT/PLAN:  Impetigo  Eye irritation  Peripheral vascular disease (H24)  Venous stasis dermatitis of both lower extremities  Subacute on chronic. Ongoing.  Impetigo and eye irritation resolved.   PVD/venous stasis ongoing. Dr. Westbrook's interventions continue to be helpful but Ever continues to bear weight and take dressings off sometimes. Pain manageable w/ Tylenol (PRN is available also).   Ever will likely need to move to LTC in the future.   Follow up routinely or as needed.    Orders:  No new orders.    Electronically signed by:  Dr. Yanna Dozier, APRN CNP DNP      Sincerely,        KEVIN Bansal CNP      "

## 2023-12-13 NOTE — PROGRESS NOTES
[facility] GERIATRIC SERVICE  Episodic/Acute/Follow-Up  [place] MRN: [id number]. Place of Service where encounter took place:  [facility] ([facility]) [[id number]]   Chief Complaint   Patient presents with    RECHECK    HPI: Ever Crane  is a 78 year old (1945), who is being seen today for an episodic care visit. Today's concern is:    Ever seen today on routine follow up as he continues to rehab in [facility]. His impetigo cleared up well since we last talked and his eye is no longer bothering him. His legs are looking better, but he says both heels hurt a little since being debrided last week, he feels Tylenol controls this. He otherwise denies SOB, CP, cough/fever.     Past Medical and Surgical History reviewed in Epic today.  MEDICATIONS:  Current Outpatient Medications   Medication Sig Dispense Refill    acetaminophen (TYLENOL) 325 MG tablet 1000 mg BID scheduled and 650 mg bid prn      albuterol (PROAIR HFA/PROVENTIL HFA/VENTOLIN HFA) 108 (90 Base) MCG/ACT inhaler Inhale 2 puffs into the lungs 2 times daily as needed      cetirizine (ZYRTEC) 5 MG tablet Take 5 mg by mouth daily      clopidogrel (PLAVIX) 75 MG tablet Take 75 mg by mouth daily      clotrimazole-betamethasone (LOTRISONE) 1-0.05 % external cream Apply topically every 48 hours Apply to legs every other day 45 g 3    dulaglutide (TRULICITY) 0.75 MG/0.5ML pen Inject 1.5 mg Subcutaneous every 7 days      furosemide (LASIX) 40 MG tablet Take 40 mg by mouth daily      gabapentin (NEURONTIN) 100 MG capsule Take 200 mg by mouth 2 times daily      glimepiride (AMARYL) 1 MG tablet Take 1 mg by mouth every morning (before breakfast)      hypromellose (ARTIFICIAL TEARS) 0.5 % SOLN ophthalmic solution Place 1 drop Into the left eye every hour as needed for dry eyes      ketoconazole (NIZORAL) 2 % external shampoo Apply topically twice a week Mon and Fri.      losartan (COZAAR) 25 MG tablet Take 12.5 mg by mouth daily      mupirocin (BACTROBAN) 2 % external  "cream Apply topically every 48 hours Apply to BLE every other day 30 g 3    mupirocin (BACTROBAN) 2 % external ointment       naloxone (NARCAN) 0.4 MG/ML injection Inject 0.1-0.4 mg into the muscle      omeprazole (PRILOSEC) 20 MG CR capsule Take 20 mg by mouth daily      rivaroxaban ANTICOAGULANT (XARELTO) 15 MG TABS tablet Take 20 mg by mouth daily      simvastatin (ZOCOR) 40 MG tablet Take 40 mg by mouth At Bedtime      spironolactone (ALDACTONE) 25 MG tablet Take 25 mg by mouth daily      tamsulosin (FLOMAX) 0.4 MG capsule Take 0.4 mg by mouth daily       Objective: BP (!) 155/63   Pulse 54   Temp 97.8  F (36.6  C)   Resp 17   Ht 1.778 m (5' 10\")   Wt 84.1 kg (185 lb 4.8 oz)   SpO2 94%   BMI 26.59 kg/m    Exam:  GENERAL APPEARANCE: Alert, in no distress, cooperative.   RESP: Respiratory effort good, no respiratory distress, On RA.   CV: Edema trace+ BLE, ruborus color w/ improved skin.  PSYCH: Insight, judgement, and memory are impaired at baseline, affect and mood are happy/engaged.    Labs: Labs done in facility are in EPIC. Please refer to them using [employer]/Care Everywhere.    ASSESSMENT/PLAN:  Impetigo  Eye irritation  Peripheral vascular disease (H24)  Venous stasis dermatitis of both lower extremities  Subacute on chronic. Ongoing.  Impetigo and eye irritation resolved.   PVD/venous stasis ongoing. Dr. Westbrook's interventions continue to be helpful but Ever continues to bear weight and take dressings off sometimes. Pain manageable w/ Tylenol (PRN is available also).   Ever will likely need to move to LTC in the future.   Follow up routinely or as needed.    Orders:  No new orders.    Electronically signed by:  Dr. Yanna Dozier, APRN CNP DNP    "

## 2024-01-05 ENCOUNTER — OFFICE VISIT (OUTPATIENT)
Dept: VASCULAR SURGERY | Facility: CLINIC | Age: 79
End: 2024-01-05
Attending: NURSE PRACTITIONER
Payer: MEDICARE

## 2024-01-05 VITALS
HEART RATE: 60 BPM | OXYGEN SATURATION: 93 % | RESPIRATION RATE: 18 BRPM | SYSTOLIC BLOOD PRESSURE: 154 MMHG | TEMPERATURE: 98 F | DIASTOLIC BLOOD PRESSURE: 72 MMHG

## 2024-01-05 DIAGNOSIS — L30.9 DERMATITIS: ICD-10-CM

## 2024-01-05 DIAGNOSIS — T81.329D DEHISCENCE OF INTERNAL SURGICAL WOUND, SUBSEQUENT ENCOUNTER: ICD-10-CM

## 2024-01-05 DIAGNOSIS — L89.623 PRESSURE ULCER OF LEFT HEEL, STAGE 3 (H): ICD-10-CM

## 2024-01-05 DIAGNOSIS — Z22.322 MRSA COLONIZATION: ICD-10-CM

## 2024-01-05 DIAGNOSIS — Z79.01 LONG TERM CURRENT USE OF ANTICOAGULANT THERAPY: ICD-10-CM

## 2024-01-05 DIAGNOSIS — L89.613 PRESSURE ULCER OF RIGHT HEEL, STAGE 3 (H): Primary | ICD-10-CM

## 2024-01-05 DIAGNOSIS — G31.84 MILD COGNITIVE IMPAIRMENT: ICD-10-CM

## 2024-01-05 DIAGNOSIS — I73.9 PAD (PERIPHERAL ARTERY DISEASE) (H): ICD-10-CM

## 2024-01-05 DIAGNOSIS — I70.25 ATHEROSCLEROSIS OF NATIVE ARTERIES OF OTHER EXTREMITIES WITH ULCERATION (H): ICD-10-CM

## 2024-01-05 DIAGNOSIS — Z89.432 STATUS POST AMPUTATION OF LEFT FOOT (H): ICD-10-CM

## 2024-01-05 DIAGNOSIS — L97.511 ULCER OF RIGHT FOOT LIMITED TO BREAKDOWN OF SKIN (H): ICD-10-CM

## 2024-01-05 PROCEDURE — 11042 DBRDMT SUBQ TIS 1ST 20SQCM/<: CPT | Performed by: NURSE PRACTITIONER

## 2024-01-05 RX ORDER — RIVAROXABAN 20 MG/1
TABLET, FILM COATED ORAL
COMMUNITY
Start: 2023-12-29 | End: 2024-02-23

## 2024-01-05 ASSESSMENT — PAIN SCALES - GENERAL: PAINLEVEL: NO PAIN (0)

## 2024-01-05 NOTE — PROGRESS NOTES
Follow up Vascular Visit       Date of Service:01/05/24      Chief Complaint: BLE swelling; right TMA dehiscence; right heel fissure      Pt returns to [facility] with regards to their BLE swelling; right TMA dehiscence; right heel fissure.  They arrive today with sister.  Sister is very frustrated with the patient as he is not wearing his prevalon boots and he is up walking around his room using basins to put his feet in and then slide everywhere. They are currently using mupirocin ointment; hydrofera blue ready to the wounds; viscopaste wraps to the legs. Arrives with no hydrofera blue on his wounds. This is being done by staff at [facility] 2-3 times per week. They are using tubular compression for compression. Vascular team would only like 10-20mmHG of compression due to underlying arterial disease. They are feeling well today. Denies fevers, chills. No shortness of breath. Wearing prevalon boots. They are requesting new boots.      Allergies:   Allergies   Allergen Reactions    Lanolin      Other Reaction(s): Not available    Cranberry Extract Itching and Rash    Doxycycline Rash    Latex Rash    Penicillin V Rash     Reaction occurred as a child. Patient tolerated Zosyn 6/2018, Cefazolin 10/2018, and has also tolerated Augmentin.    Penicillins Rash     Reaction occurred as a child. Patient tolerated Zosyn 6/2018, Cefazolin 10/2018, and has also tolerated Augmentin.       Medications:   Current Outpatient Medications:     acetaminophen (TYLENOL) 325 MG tablet, 1000 mg BID scheduled and 650 mg bid prn, Disp: , Rfl:     albuterol (PROAIR HFA/PROVENTIL HFA/VENTOLIN HFA) 108 (90 Base) MCG/ACT inhaler, Inhale 2 puffs into the lungs 2 times daily as needed, Disp: , Rfl:     cetirizine (ZYRTEC) 5 MG tablet, Take 5 mg by mouth daily, Disp: , Rfl:     clopidogrel (PLAVIX) 75 MG tablet, Take 75 mg by mouth daily, Disp: , Rfl:     clotrimazole-betamethasone (LOTRISONE) 1-0.05 % external  cream, Apply topically every 48 hours Apply to legs every other day, Disp: 45 g, Rfl: 3    dulaglutide (TRULICITY) 0.75 MG/0.5ML pen, Inject 1.5 mg Subcutaneous every 7 days, Disp: , Rfl:     furosemide (LASIX) 40 MG tablet, Take 40 mg by mouth daily, Disp: , Rfl:     gabapentin (NEURONTIN) 100 MG capsule, Take 200 mg by mouth 2 times daily, Disp: , Rfl:     glimepiride (AMARYL) 1 MG tablet, Take 1 mg by mouth every morning (before breakfast), Disp: , Rfl:     hypromellose (ARTIFICIAL TEARS) 0.5 % SOLN ophthalmic solution, Place 1 drop Into the left eye every hour as needed for dry eyes, Disp: , Rfl:     ketoconazole (NIZORAL) 2 % external shampoo, Apply topically twice a week Mon and Fri., Disp: , Rfl:     losartan (COZAAR) 25 MG tablet, Take 12.5 mg by mouth daily, Disp: , Rfl:     mupirocin (BACTROBAN) 2 % external cream, Apply topically every 48 hours Apply to BLE every other day, Disp: 30 g, Rfl: 3    mupirocin (BACTROBAN) 2 % external ointment, , Disp: , Rfl:     naloxone (NARCAN) 0.4 MG/ML injection, Inject 0.1-0.4 mg into the muscle, Disp: , Rfl:     omeprazole (PRILOSEC) 20 MG CR capsule, Take 20 mg by mouth daily, Disp: , Rfl:     rivaroxaban ANTICOAGULANT (XARELTO) 15 MG TABS tablet, Take 20 mg by mouth daily, Disp: , Rfl:     simvastatin (ZOCOR) 40 MG tablet, Take 40 mg by mouth At Bedtime, Disp: , Rfl:     spironolactone (ALDACTONE) 25 MG tablet, Take 25 mg by mouth daily, Disp: , Rfl:     tamsulosin (FLOMAX) 0.4 MG capsule, Take 0.4 mg by mouth daily, Disp: , Rfl:     XARELTO ANTICOAGULANT 20 MG TABS tablet, , Disp: , Rfl:     History:   Past Medical History:   Diagnosis Date    A-fib (H)     Acute diastolic heart failure (H) 06/07/2022    Acute posthemorrhagic anemia 05/17/2022    MESHA (acute kidney injury) (H24)     Anemia     Atopic keratoconjunctivitis     Atrial fibrillation (H)     Brian Moe: 8/2011 Cardioversion; CHADS2 VASC = 5; he is on warfarin and sotalol     Atrial flutter (H)      Mcgarry's esophagus 01/01/2012    per note of Dr. Tavo Mike of [facility]    Bilateral lower leg cellulitis 08/31/2018    BPH (benign prostatic hyperplasia)     Candidiasis of perineum 01/03/2018    Carotid stenosis     Carotid stenosis, asymptomatic, right     Cellulitis     CHF (congestive heart failure) (H)     Cholecystitis 10/14/2019    Cholecystitis, acute 08/18/2019    Chronic systolic heart failure (H)     Chronic venous stasis dermatitis     Closed nondisplaced intertrochanteric fracture of left femur with routine healing, subsequent encounter 05/18/2022    Clostridium difficile colitis     Contact with and (suspected) exposure to covid-19 12/13/2021    COPD (chronic obstructive pulmonary disease) (H)     Coronary artery disease due to lipid rich plaque 2000    CABG x2    Coronary atherosclerosis     Diabetes (H)     Diabetic ulcer of both feet (H) 10/31/2017    Diabetic ulcer of toe of right foot associated with diabetes mellitus due to underlying condition, limited to breakdown of skin (H) 01/05/2023    Diabetic ulcer of toe of right foot associated with diabetes mellitus due to underlying condition, with necrosis of bone (H) 01/05/2023    Dyslexia     Dyslipidemia, goal LDL below 70 2000    Epistaxis     Essential hypertension     Gangrene of left foot (H)     GERD (gastroesophageal reflux disease)     HLD (hyperlipidemia)     HTN (hypertension)     Hyponatremia     Ischemic heart disease     Lymphedema     Mild cognitive impairment     MRSA (methicillin resistant Staphylococcus aureus)     Neuropathy     Non-STEMI (non-ST elevated myocardial infarction) (H)     Occlusion and stenosis of right carotid artery     Osteoarthrosis     Osteomyelitis of ankle and foot (H)     Other atopic dermatitis     PAD (peripheral artery disease) (H24)     Peripheral vascular disease (H24)     Pneumonia 06/26/2018    Polyneuropathy due to type 2 diabetes mellitus (H)     Pressure ulcer, heel     Bilateral    Renal  insufficiency     Type 2 diabetes mellitus, without long-term current use of insulin (H)     Ulcer of right foot (H)     Unable to function independently 11/13/2017       Physical Exam:    BP (!) 154/72   Pulse 60   Temp 98  F (36.7  C)   Resp 18   SpO2 93%     General:  Patient presents to clinic in no apparent distress.  Head: normocephalic atraumatic  Psychiatric:  Alert and oriented x3.   Respiratory: unlabored breathing; no cough  Integumentary:  Skin is uniformly warm, dry and pink.    Ulcer #1 Location: right TMA  Size: 1L x 5W x 0.1depth.  no sinus tract present, Wound base: red with surrounding thick crust no undermining present. Ulcer is full thickness. There is moderate drainage. Periwound: no denudement, erythema, induration, maceration or warmth.      Ulcer #2 Location: right heel  Size: 0.3x0.2 x 0.1depth.  no sinus tract present, Wound base: red no undermining present. Ulcer is full thickness. There is moderate drainage. Periwound: no denudement, erythema, induration, maceration or warmth.      Swelling is down today; skin is very dry and scaling; petechial hemorrhages present of the legs    PICC Single Lumen Right Basilic (Active)   Number of days:        VASC Wound Right heel (Active)   Pre Size Length 0.3 01/05/24 1500   Pre Size Width 0.2 01/05/24 1500   Pre Size Depth 0.1 01/05/24 1500   Pre Total Sq cm 0.06 01/05/24 1500   Post Size Length 1 11/10/23 1000   Post Size Width 1.8 11/10/23 1000   Post Size Depth 0.1 11/10/23 1000   Post Total Sq cm 1.8 11/10/23 1000   Description eschar 04/05/23 1000   Number of days: 357       VASC Wound Left heel (Active)   Pre Size Length 0 01/05/24 1500   Pre Size Width 0 01/05/24 1500   Pre Size Depth 0 01/05/24 1500   Pre Total Sq cm 0 01/05/24 1500   Post Size Length 7 01/13/23 1300   Post Size Width 6 01/13/23 1300   Post Size Depth 0.1 01/13/23 1300   Post Total Sq cm 42 01/13/23 1300   Description callous 11/29/23 0900   Number of days: 357       VASC  Wound RIGHT FOOT TMA MEDIAL (Active)   Pre Size Length 0.8 11/29/23 0900   Pre Size Width 4 11/29/23 0900   Pre Size Depth 0.1 11/29/23 0900   Pre Total Sq cm 3.2 11/29/23 0900   Number of days: 37       VASC Wound Right TMA (Active)   Pre Size Length 1 01/05/24 1500   Pre Size Width 5 01/05/24 1500   Pre Size Depth 0.1 01/05/24 1500   Pre Total Sq cm 5 01/05/24 1500   Description scab 12/08/23 1100   Number of days: 28       Incision/Surgical Site 06/01/23 Bilateral Foot (Active)   Number of days: 218            Circumferential volume measures:          11/10/2023    10:00 AM 12/8/2023    11:00 AM 1/5/2024     3:00 PM   Circumferential Measures   Right just above MTP 26 24 24   Right Ankle 35 26 25   Right Widest Calf 37 34 32   Left - just above MTP 26 22.5 22.5   Left Ankle 28 25.5 24.5   Left Widest Calf 34 32.5 32       Labs:    I personally reviewed the following lab results today and those on care everywhere    CRP Inflammation   Date Value Ref Range Status   06/29/2018 120.0 (H) 0.0 - 8.0 mg/L Final     CRP   Date Value Ref Range Status   01/03/2019 4.0 (H) 0.0 - 0.8 mg/dL Final      Sed Rate   Date Value Ref Range Status   06/27/2018 50 (H) 0 - 20 mm/h Final     Erythrocyte Sedimentation Rate   Date Value Ref Range Status   01/17/2023 48 (H) 0 - 15 mm/hr Final      Last Renal Panel:  Sodium   Date Value Ref Range Status   10/16/2023 142 135 - 145 mmol/L Final     Comment:     Reference intervals for this test were updated on 09/26/2023 to more accurately reflect our healthy population. There may be differences in the flagging of prior results with similar values performed with this method. Interpretation of those prior results can be made in the context of the updated reference intervals.    06/30/2018 139 133 - 144 mmol/L Final     Potassium   Date Value Ref Range Status   10/16/2023 4.4 3.4 - 5.3 mmol/L Final   01/24/2023 5.5 (H) 3.5 - 5.0 mmol/L Final   06/30/2018 4.1 3.4 - 5.3 mmol/L Final     Chloride    Date Value Ref Range Status   10/16/2023 105 98 - 107 mmol/L Final   01/24/2023 107 98 - 107 mmol/L Final   06/30/2018 108 94 - 109 mmol/L Final     Carbon Dioxide   Date Value Ref Range Status   06/30/2018 20 20 - 32 mmol/L Final     Carbon Dioxide (CO2)   Date Value Ref Range Status   10/16/2023 25 22 - 29 mmol/L Final   01/24/2023 21 (L) 22 - 31 mmol/L Final     Anion Gap   Date Value Ref Range Status   10/16/2023 12 7 - 15 mmol/L Final   01/24/2023 11 5 - 18 mmol/L Final   06/30/2018 11 3 - 14 mmol/L Final     Glucose   Date Value Ref Range Status   10/16/2023 94 70 - 99 mg/dL Final   01/24/2023 106 70 - 125 mg/dL Final   06/30/2018 162 (H) 70 - 99 mg/dL Final     GLUCOSE BY METER POCT   Date Value Ref Range Status   06/01/2023 117 (H) 70 - 99 mg/dL Final     Urea Nitrogen   Date Value Ref Range Status   10/16/2023 25.2 (H) 8.0 - 23.0 mg/dL Final   01/24/2023 22 8 - 28 mg/dL Final   06/30/2018 22 7 - 30 mg/dL Final     Creatinine   Date Value Ref Range Status   10/16/2023 1.21 (H) 0.67 - 1.17 mg/dL Final   06/30/2018 1.20 0.66 - 1.25 mg/dL Final     GFR Estimate   Date Value Ref Range Status   10/16/2023 61 >60 mL/min/1.73m2 Final   03/09/2021 46 (L) >60 mL/min/1.73m2 Final   06/30/2018 59 (L) >60 mL/min/1.7m2 Final     Comment:     Non  GFR Calc     Calcium   Date Value Ref Range Status   10/16/2023 9.7 8.8 - 10.2 mg/dL Final   06/30/2018 9.0 8.5 - 10.1 mg/dL Final     Phosphorus   Date Value Ref Range Status   08/19/2019 2.4 (L) 2.5 - 4.5 mg/dL Final     Albumin   Date Value Ref Range Status   08/18/2023 4.0 3.5 - 5.2 g/dL Final   10/21/2021 4.1 3.5 - 5.0 g/dL Final   06/29/2018 2.8 (L) 3.4 - 5.0 g/dL Final      Lab Results   Component Value Date    WBC 7.1 10/16/2023    WBC 7.3 06/30/2018     Lab Results   Component Value Date    RBC 4.49 10/16/2023    RBC 3.67 06/30/2018     Lab Results   Component Value Date    HGB 11.4 10/16/2023    HGB 10.5 06/30/2018     Lab Results   Component Value  "Date    HCT 36.1 10/16/2023    HCT 30.5 06/30/2018     No components found for: \"MCT\"  Lab Results   Component Value Date    MCV 80 10/16/2023    MCV 83 06/30/2018     Lab Results   Component Value Date    MCH 25.4 10/16/2023    MCH 28.6 06/30/2018     Lab Results   Component Value Date    MCHC 31.6 10/16/2023    MCHC 34.4 06/30/2018     Lab Results   Component Value Date    RDW 16.6 10/16/2023    RDW 13.7 06/30/2018     Lab Results   Component Value Date     10/16/2023     06/30/2018      Lab Results   Component Value Date    A1C 7.5 10/16/2023    A1C 6.6 05/24/2023    A1C 6.7 05/15/2023    A1C 7.0 07/12/2022    A1C 6.3 10/21/2021    A1C 7.7 06/26/2018    A1C 7.3 04/20/2018      TSH   Date Value Ref Range Status   07/12/2022 3.20 0.30 - 4.20 uIU/mL Final   03/09/2021 2.06 0.30 - 5.00 uIU/mL Final   06/26/2018 0.86 0.40 - 4.00 mU/L Final      No results found for: \"VITDT\"                Impression:  Encounter Diagnoses   Name Primary?    Pressure ulcer of right heel, stage 3 (H) Yes    Ulcer of right foot limited to breakdown of skin (H)     Pressure ulcer of left heel, stage 3 (H)     MRSA colonization     PAD (peripheral artery disease) (H24)     Dermatitis     Status post amputation of left foot (H)     Atherosclerosis of native arteries of other extremities with ulceration (H)     Long term current use of anticoagulant therapy     Dehiscence of internal surgical wound, subsequent encounter     Mild cognitive impairment                                            Are any of these ulcers new today: No; Location: na    Assessment/Plan:          1. Debridement: Nursing staff removed the old dressing and cleanse the wound(s) with specified solution. After discussion of risk factors and verbal consent was obtained 2% Lidocaine HCL jelly was applied, under clean conditions, the right foot ulceration(s) were debrided using currette or #15 blade scalpel. Devitalized and nonviable tissue, along with any " fibrin and slough, was removed to improve granulation tissue formation, stimulate wound healing, decrease overall bacteria load, disrupt biofilm formation and decrease edge senescence.  Total excisional debridement was 5.06 sq cm from the epidermis/dermis area and into the subcutaneous tissue with a depth of 0.1 cm.   Ulcers were improved afterwards and .  Measures were unchanged after debridement.       2.  Ulcer treatment: ulcer treatment will include irrigation and dressings to promote autolytic debridement which will include:will continue with dilute hibiclens wash; then mupirocin ointment; then xeroform to the heel and hydrofera blue ready to the old incision site on the foot; viscopaste; rolled gauze staff at facility to change every other day If for some reason the patient is not able to get their dressing(s) changed as outlined above (due to illness, lack of supplies, lack of help) please do the following: remove old, soiled dressings; wash the ulcers with saline; pat dry; apply ABD pad or other absorbant pad and secure with rolled gauze; avoid tape directly on your skin; patient instructed to call the clinic as soon as possible to let us know what the current issues are in receiving ulcer care. Improved            3. Edema: due to underlying PAD will only be able to use tubular compression and elevation. The compression wraps were applied today in clinic.     If a 2 layer or 4 layer compression wrap is being used; these are safe to have on for ONLY 7 days. If for some reason the patient is not able to get the wrap(s) changed (due to illness; lack of supplies, lack of help, lack of transportation) please do the following: unwrap the old 2 or 4 layer compression wrap; avoid using scissors as you could cut your skin and cause ulcers; use tubular compression when available. Call to reschedule your home care or clinic visit appointment as soon as possible.  Stable            4. Nutrition: focus on low  sodium           5. Offloading: needs to be NWB to the right foot; this was discussed at length again; he should not be skating arounding in plastic basins this is not safe; explained that if he continues to bear weight this could result in amputation of the legs          6. Wound Etiology: dehiscence of surgical site; pressure ulcer; PAD     Patient will follow up with me in 4 weeks for reevaluation. They were instructed to call the clinic sooner with any signs or symptoms of infection or any further questions/concerns. Answered all questions.          Heather Robertson DNP, RN, CNP, CWOCN, CFCN, CLT  [facility]   [phone]        This note was electronically signed by Heather Robertson NP

## 2024-01-05 NOTE — PATIENT INSTRUCTIONS
"    Needs to elevate in bed during the day 4 times per day for 40 minute increments    Wound care supplies were not ordered or needed today. Facility to order      Facility or family or patient to order new Prevalon Boots or Rooke boots      Important lnstructions      WEIGHT BEARING STATUS: You are to remain NON WEIGHT BEARING on your left and right foot. NON WEIGHT BEARING MEANS NO PRESSURE ON YOUR FOOT OR HEEL AT ANY TIME FOR ANY REASON!    2. OFFLOADING DEVICE: Must use a A WHEELCHAIR at all times! (do not use affected foot to push wheelchair)    3. STABILIZATION DEVICE: Use a  PREVALON  . You will need to WEAR THIS AT ALL TIMES EVEN WHILE IN BED.     4. ELEVATE: Elevating your leg means laying with your head on a pillow and your foot ABOVE YOUR HEART.     5. DO NOT MOVE YOUR FOOT.  There is a risk of worsening the wound or incision. To give yourself a higher chance of healing, please DO NOT swing foot back and forth and wiggle foot/toes especially when inside a stabilization device. Limited movement is allowable with therapy as recommended by the doctor.     6. TAKE A PROTEIN SHAKE TWICE A DAY.  (For ex: Boost, Ensure, Glucerna)      Dressing Change lnstructions        LEFT HEEL IS HEALED!!!!        Wound Care Instructions    EVERY OTHER DAY and as needed, Cleanse your bilateral legs wound(s) with Dilute hibiclens 30cc in 500cc NS.    Pat Dry with non-sterile gauze    Apply Lotion to the intact skin surrounding your wound and other dry skin locations. Some good lotions include: Remedy Skin Repair Cream, Sarna, Vanicream or Cetaphil    Primary Dressing: Apply mupirocin cream on wounds into/onto the wounds    Apply hydrofera blue ready on right foot amputated surgical incision; and xeroform to the right heel    Secondary dressing: Cover with gauze    Wrap the legs with viscopaste; using spiral technique    Secure with non-sterile roll gauze (4\" x 75\" roll) and tape (1\" roll tape) as needed; avoid adhesive " directly on the skin    Compression: tubular compression    It is not ok to get your wound wet in the bath or shower      If for some reason you are not able to get your dressing(s) changed as outlined above (due to illness, lack of supplies, lack of help) please do the following: remove old, soiled dressings; wash the wounds with saline; pat dry; apply ABD pad or other absorbant pad and secure with rolled gauze; avoid tape directly on your skin; Call the clinic as soon as possible to let us know what the current issues are in receiving wound care [phone].      SEEK MEDICAL CARE IF:  You have an increase in swelling, pain, or redness around the wound.  You have an increase in the amount of pus coming from the wound.  There is a bad smell coming from the wound.  The wound appears to be worsening/enlarging  You have a fever greater than 101.5 F      It is ok to continue current wound care treatment/products for the next 2-3 days until new wound care supplies are ordered and arrive. If longer than this please contact our office at [phone].    If you have a 2 layer or 4 layer compression wrap on these are safe to have on for ONLY 7 days. If for some reason you are not able to get the wrap(s) changed (due to illness; lack of supplies, lack of help, lack of transportation) please do the following: unwrap the old 2 or 4 layer compression wrap; avoid using scissors as you could cut your skin and cause wounds; use tubular compression when available. Call to reschedule your home care or clinic visit appointment as soon as possible.    Please NOTE: if you are 15 minutes late to your clinic appointment you will have to be rescheduled. Please call our clinic as soon as possible if you know you will not be able to get to your appointment at [phone].    If you fail to show up to 3 scheduled clinic appointments you will be dismissed from our clinic.              We want to hear from you!  In the next few weeks, you  should receive a call or email to complete a survey about your visit at [facility]. Please help us improve your appointment experience by letting us know how we did today. We strive to make your experience good and value any ways in which we could do better.      We value your input and suggestions.    Thank you for choosing the [facility]!           It is recommended that you do not get your ulcer wet when showering.  Listed below are several ways of keeping it dry when you shower.     1. Wrap it with Press and Seal plastic wrap.  It can be found in the stores where the plastic wraps or tin foil is kept.               2.  Some people take a bath and hang their leg/foot out of the tub.                        3  Put your leg in a plastic bag and tape it on.           4. You can purchase a shower cover for casts at some pharmacies and through the Internet.            5. Take a Bed Bath or wash up at the sink     High Protein Foods  Chicken  -Chicken breast, 3.5oz.-30 grams protein  -Chicken thigh-10 grams(average size)  -Drumstick-11 grams  -Wing- 6 grams  -Chicken meat, cooked, 4 oz.  Beef  -Hamburger jacquelin, 4 oz-28 grams protein  -Steak, 6 oz-42 grams  -Most cuts of beef- 7 grams of protein per ounce  Fish  -Most fish fillets or steaks are about 22 grams of protein for 3 1/2 oz(100 grams) of cooked fish, or 6 grams per ounce  -Tuna, 6 oz can-40 grams of protein  Pork  -Pork chop, average-22 grams protein  -Pork loin or tenderloin, 4 oz.-29 grams  -Ham, 3oz serving- 19 grams  -Ground pork 3oz cooked-22 grams  -Nelson, 1 slice-3 grams  -[place]-style nelson(black nelson), slice-5-6 grams  Eggs and Dairy  -Egg, large-7 grams  -Milk, 1 cup-8 grams  -Cottage cheese, 1/2 cup-15 grams  -Greek yogurt, 1 cup-usually 8-12 grams, check label  -Soft cheeses (Mozzarella, Brie, Camembert)- 6 grams  -Medium cheeses(cheddar, swiss)- 7 or 8 grams per oz  -Hard cheeses(parmesan)- 10 grams per  oz  Beans  -Tofu, 1/2 cup 20 grams  -Tofu, 1 oz., 2.3 grams  -Soy milk, 1 cup-6-10 grams  -Most beans(black, vicente, lentils, etc.) about 7-10 grams protein per half cup of cooked beans  -soy beans, 1/2 cup cooked-14 grams  -Split peas, 1/2 cup cooked- 8 grams  Nuts and Seeds  -Peanut butter, 2 Tablespoons- 8 grams protein  -Almonds, 1/4 cup- 8 grams  -Peanuts, 1/4 cup-9 grams  -Cashews, 1/4 cup- 5 grams  -Pecans, 1/4 cup- 2.5 grams  -Sunflower seeds, 1/4 cup- 6 grams  -Pumpkin seeds, 1/4 cup-8 grams  -Flax seeds- 1/4 cup- 8 grams  Protein Supplements  -Ensure  -Boost  -Glucerna, if diabetic  When you have an open ulcer, your bodies protein needs are much higher, so it is recommended eat good sources of protein       Prevalon Boot          EZ Heelift Boot              Prafo Boot          Foam Ring

## 2024-01-12 ENCOUNTER — TRANSITIONAL CARE UNIT VISIT (OUTPATIENT)
Dept: GERIATRICS | Facility: CLINIC | Age: 79
End: 2024-01-12
Payer: MEDICARE

## 2024-01-12 ENCOUNTER — TELEPHONE (OUTPATIENT)
Dept: VASCULAR SURGERY | Facility: CLINIC | Age: 79
End: 2024-01-12

## 2024-01-12 VITALS
DIASTOLIC BLOOD PRESSURE: 56 MMHG | OXYGEN SATURATION: 96 % | TEMPERATURE: 97.5 F | HEART RATE: 61 BPM | RESPIRATION RATE: 14 BRPM | SYSTOLIC BLOOD PRESSURE: 129 MMHG | BODY MASS INDEX: 27.74 KG/M2 | WEIGHT: 193.3 LBS

## 2024-01-12 DIAGNOSIS — E11.59 TYPE 2 DIABETES MELLITUS WITH OTHER CIRCULATORY COMPLICATION, WITH LONG-TERM CURRENT USE OF INSULIN (H): ICD-10-CM

## 2024-01-12 DIAGNOSIS — J44.9 CHRONIC OBSTRUCTIVE PULMONARY DISEASE, UNSPECIFIED COPD TYPE (H): ICD-10-CM

## 2024-01-12 DIAGNOSIS — I25.728 CORONARY ARTERY DISEASE OF AUTOLOGOUS BYPASS GRAFT WITH STABLE ANGINA PECTORIS (H): ICD-10-CM

## 2024-01-12 DIAGNOSIS — G63 POLYNEUROPATHY IN DISEASES CLASSIFIED ELSEWHERE (H): ICD-10-CM

## 2024-01-12 DIAGNOSIS — F03.90 DEMENTIA, UNSPECIFIED DEMENTIA SEVERITY, UNSPECIFIED DEMENTIA TYPE, UNSPECIFIED WHETHER BEHAVIORAL, PSYCHOTIC, OR MOOD DISTURBANCE OR ANXIETY (H): ICD-10-CM

## 2024-01-12 DIAGNOSIS — I48.19 PERSISTENT ATRIAL FIBRILLATION (H): ICD-10-CM

## 2024-01-12 DIAGNOSIS — N18.32 STAGE 3B CHRONIC KIDNEY DISEASE (H): ICD-10-CM

## 2024-01-12 DIAGNOSIS — Z79.4 TYPE 2 DIABETES MELLITUS WITH OTHER CIRCULATORY COMPLICATION, WITH LONG-TERM CURRENT USE OF INSULIN (H): ICD-10-CM

## 2024-01-12 DIAGNOSIS — I87.2 VENOUS STASIS DERMATITIS OF BOTH LOWER EXTREMITIES: Primary | ICD-10-CM

## 2024-01-12 DIAGNOSIS — I50.30 HEART FAILURE WITH PRESERVED EJECTION FRACTION, NYHA CLASS II (H): ICD-10-CM

## 2024-01-12 PROBLEM — M86.9 OSTEOMYELITIS OF ANKLE AND FOOT (H): Chronic | Status: RESOLVED | Noted: 2023-01-13 | Resolved: 2024-01-12

## 2024-01-12 PROBLEM — L97.514 ULCER OF RIGHT FOOT WITH NECROSIS OF BONE (H): Status: RESOLVED | Noted: 2023-08-24 | Resolved: 2024-01-12

## 2024-01-12 PROBLEM — L97.514: Status: RESOLVED | Noted: 2023-05-24 | Resolved: 2024-01-12

## 2024-01-12 PROCEDURE — 99309 SBSQ NF CARE MODERATE MDM 30: CPT | Performed by: NURSE PRACTITIONER

## 2024-01-12 NOTE — TELEPHONE ENCOUNTER
Caller: Marie    Provider: DANIELA Robertson    Detailed reason for call: Marie is calling wondering if there is another alternative than spending $100 round trip to see LK? She is wondering if he can be seen in [place], , or his TCU?    Best phone number to contact: [phone]    Best time to contact: any    Ok to leave a detailed message: Yes    Ok to speak to authorized person if needed: No      (Noted to patient if reason is related to wound or incision, to please send a photo via email or Enabled Employmentt.)

## 2024-01-12 NOTE — LETTER
1/12/2024        RE: Ever Crane  [address]  [address]  [facility] [address]        [facility] GERIATRIC SERVICE  Episodic/Acute/Follow-Up  [place] MRN: [id number]. Place of Service where encounter took place:  [facility] [place] ([place]) [[id number]]   Chief Complaint   Patient presents with     RECHECK    HPI: Ever Crane  is a 78 year old (1945), who is being seen today for an episodic care visit. Today's concern is:    Ever seen today on routine follow-up as he continues to receive wound care in TCU.  Today, we coordinated care with vascular wound care team and in-house wound care team.  A few days ago, we held Unna boots secondary to significant rash which had developed.    Today, Ever denies itching, feels his rash is better, and is getting some more sensation in his lower extremities.  He continues to believe that he can drive.  He would like to return to his assisted living.  He denies pain or any other new signs/symptoms.      Past Medical and Surgical History reviewed in Epic today.  MEDICATIONS:  Current Outpatient Medications   Medication Sig Dispense Refill     acetaminophen (TYLENOL) 325 MG tablet 1000 mg BID scheduled and 650 mg bid prn       albuterol (PROAIR HFA/PROVENTIL HFA/VENTOLIN HFA) 108 (90 Base) MCG/ACT inhaler Inhale 2 puffs into the lungs 2 times daily as needed       cetirizine (ZYRTEC) 5 MG tablet Take 5 mg by mouth daily       clopidogrel (PLAVIX) 75 MG tablet Take 75 mg by mouth daily       clotrimazole-betamethasone (LOTRISONE) 1-0.05 % external cream Apply topically every 48 hours Apply to legs every other day 45 g 3     dulaglutide (TRULICITY) 0.75 MG/0.5ML pen Inject 1.5 mg Subcutaneous every 7 days       furosemide (LASIX) 40 MG tablet Take 40 mg by mouth daily       gabapentin (NEURONTIN) 100 MG capsule Take 200 mg by mouth 2 times daily       glimepiride (AMARYL) 1 MG tablet Take 1 mg by mouth every morning (before breakfast)       hypromellose (ARTIFICIAL  TEARS) 0.5 % SOLN ophthalmic solution Place 1 drop Into the left eye every hour as needed for dry eyes       ketoconazole (NIZORAL) 2 % external shampoo Apply topically twice a week Mon and Fri.       losartan (COZAAR) 25 MG tablet Take 12.5 mg by mouth daily       mupirocin (BACTROBAN) 2 % external cream Apply topically every 48 hours Apply to BLE every other day 30 g 3     mupirocin (BACTROBAN) 2 % external ointment        naloxone (NARCAN) 0.4 MG/ML injection Inject 0.1-0.4 mg into the muscle       omeprazole (PRILOSEC) 20 MG CR capsule Take 20 mg by mouth daily       rivaroxaban ANTICOAGULANT (XARELTO) 15 MG TABS tablet Take 20 mg by mouth daily       simvastatin (ZOCOR) 40 MG tablet Take 40 mg by mouth At Bedtime       spironolactone (ALDACTONE) 25 MG tablet Take 25 mg by mouth daily       tamsulosin (FLOMAX) 0.4 MG capsule Take 0.4 mg by mouth daily       XARELTO ANTICOAGULANT 20 MG TABS tablet        Objective: /56   Pulse 61   Temp 97.5  F (36.4  C)   Resp 14   Wt 87.7 kg (193 lb 4.8 oz)   SpO2 96%   BMI 27.74 kg/m    Exam:  GENERAL APPEARANCE: Alert, in no distress, cooperative.   RESP: Respiratory effort good, no respiratory distress, On RA.   CV/Skin: Edema 1+ BLE. Legs as pictured here:    PSYCH: Insight, judgement, and memory are impaired at baseline, affect and mood are happy/engaged.    Labs: Labs done in facility are in EPIC. Please refer to them using [employer]/Care Everywhere.    ASSESSMENT/PLAN:  Polyneuropathy in diseases classified elsewhere (H24)  Heart failure with preserved ejection fraction, NYHA class II (H)  Chronic obstructive pulmonary disease, unspecified COPD type (H)  Persistent atrial fibrillation (H)  Type 2 diabetes mellitus with other circulatory complication, with long-term current use of insulin (H)  Coronary artery disease of autologous bypass graft with stable angina pectoris (H24)  Stage 3b chronic kidney disease (H)  Dementia, unspecified dementia severity,  unspecified dementia type, unspecified whether behavioral, psychotic, or mood disturbance or anxiety (H)  Venous stasis dermatitis of both lower extremities  Acute on chronic.  Ongoing.  Provider reviewed records from specialist, facility, and interpreted most recent imaging/lab work, and vital signs.  CHF, A-fib, CAD, CKD not addressed today, but relevant to overall picture/add to comorbidity, and her active diagnoses.  Diabetes, venous stasis, polyneuropathy, and chronic skin issues make discharge disposition difficult.  Ever has impaired insight and underlying dementia, and coupled with these physical ailments leave him unsafe for driving.  Dermatitis has improved from 2 days ago.  Will defer to in-house wound care service from now on, and Ever can periodically follow-up with vascular if needed.  Follow-up routinely or as needed.    Orders:  No new orders.    Electronically signed by:  KEVIN Wen CNP DNP        Sincerely,        KEVIN Bansal CNP

## 2024-01-12 NOTE — PROGRESS NOTES
[facility]  Episodic/Acute/Follow-Up  [place] MRN: [id number]. Place of Service where encounter took place:  [facility] [place] [[id number]]   Chief Complaint   Patient presents with    RECHECK    HPI: Ever Crane  is a 78 year old (1945), who is being seen today for an episodic care visit. Today's concern is:    Ever seen today on routine follow-up as he continues to receive wound care in TCU.  Today, we coordinated care with vascular wound care team and in-house wound care team.  A few days ago, we held Unna boots secondary to significant rash which had developed.    Today, Ever denies itching, feels his rash is better, and is getting some more sensation in his lower extremities.  He continues to believe that he can drive.  He would like to return to his assisted living.  He denies pain or any other new signs/symptoms.      Past Medical and Surgical History reviewed in Epic today.  MEDICATIONS:  Current Outpatient Medications   Medication Sig Dispense Refill    acetaminophen (TYLENOL) 325 MG tablet 1000 mg BID scheduled and 650 mg bid prn      albuterol (PROAIR HFA/PROVENTIL HFA/VENTOLIN HFA) 108 (90 Base) MCG/ACT inhaler Inhale 2 puffs into the lungs 2 times daily as needed      cetirizine (ZYRTEC) 5 MG tablet Take 5 mg by mouth daily      clopidogrel (PLAVIX) 75 MG tablet Take 75 mg by mouth daily      clotrimazole-betamethasone (LOTRISONE) 1-0.05 % external cream Apply topically every 48 hours Apply to legs every other day 45 g 3    dulaglutide (TRULICITY) 0.75 MG/0.5ML pen Inject 1.5 mg Subcutaneous every 7 days      furosemide (LASIX) 40 MG tablet Take 40 mg by mouth daily      gabapentin (NEURONTIN) 100 MG capsule Take 200 mg by mouth 2 times daily      glimepiride (AMARYL) 1 MG tablet Take 1 mg by mouth every morning (before breakfast)      hypromellose (ARTIFICIAL TEARS) 0.5 % SOLN ophthalmic solution Place 1 drop Into the left eye every hour as needed for dry eyes       ketoconazole (NIZORAL) 2 % external shampoo Apply topically twice a week Mon and Fri.      losartan (COZAAR) 25 MG tablet Take 12.5 mg by mouth daily      mupirocin (BACTROBAN) 2 % external cream Apply topically every 48 hours Apply to BLE every other day 30 g 3    mupirocin (BACTROBAN) 2 % external ointment       naloxone (NARCAN) 0.4 MG/ML injection Inject 0.1-0.4 mg into the muscle      omeprazole (PRILOSEC) 20 MG CR capsule Take 20 mg by mouth daily      rivaroxaban ANTICOAGULANT (XARELTO) 15 MG TABS tablet Take 20 mg by mouth daily      simvastatin (ZOCOR) 40 MG tablet Take 40 mg by mouth At Bedtime      spironolactone (ALDACTONE) 25 MG tablet Take 25 mg by mouth daily      tamsulosin (FLOMAX) 0.4 MG capsule Take 0.4 mg by mouth daily      XARELTO ANTICOAGULANT 20 MG TABS tablet        Objective: /56   Pulse 61   Temp 97.5  F (36.4  C)   Resp 14   Wt 87.7 kg (193 lb 4.8 oz)   SpO2 96%   BMI 27.74 kg/m    Exam:  GENERAL APPEARANCE: Alert, in no distress, cooperative.   RESP: Respiratory effort good, no respiratory distress, On RA.   CV/Skin: Edema 1+ BLE. Legs as pictured here:    PSYCH: Insight, judgement, and memory are impaired at baseline, affect and mood are happy/engaged.    Labs: Labs done in facility are in EPIC. Please refer to them using [employer]/Care Everywhere.    ASSESSMENT/PLAN:  Polyneuropathy in diseases classified elsewhere (H24)  Heart failure with preserved ejection fraction, NYHA class II (H)  Chronic obstructive pulmonary disease, unspecified COPD type (H)  Persistent atrial fibrillation (H)  Type 2 diabetes mellitus with other circulatory complication, with long-term current use of insulin (H)  Coronary artery disease of autologous bypass graft with stable angina pectoris (H24)  Stage 3b chronic kidney disease (H)  Dementia, unspecified dementia severity, unspecified dementia type, unspecified whether behavioral, psychotic, or mood disturbance or anxiety (H)  Venous stasis  dermatitis of both lower extremities  Acute on chronic.  Ongoing.  Provider reviewed records from specialist, facility, and interpreted most recent imaging/lab work, and vital signs.  CHF, A-fib, CAD, CKD not addressed today, but relevant to overall picture/add to comorbidity, and her active diagnoses.  Diabetes, venous stasis, polyneuropathy, and chronic skin issues make discharge disposition difficult.  Ever has impaired insight and underlying dementia, and coupled with these physical ailments leave him unsafe for driving.  Dermatitis has improved from 2 days ago.  Will defer to in-house wound care service from now on, and Ever can periodically follow-up with vascular if needed.  Follow-up routinely or as needed.    Orders:  No new orders.    Electronically signed by:  Dr. Yanna Dozier, KEVIN CNP DNP

## 2024-01-12 NOTE — TELEPHONE ENCOUNTER
----- Message from Mandi Perdomo CMA sent at 1/12/2024 10:55 AM CST -----  Regarding: URGENT - imaging and reschedule  Crystal should be rescheduled with Yvonne May NP to establish care, and she will need a renal US before she sees Yvonne.    Please call to schedule.    Thank you,  Mandi     [facility] Geriatrics Lab Note     Provider: Isabel Stephens MD  Facility: [facility] Facility Type:  TCU    Allergies   Allergen Reactions    Lanolin      Other Reaction(s): Not available    Cranberry Extract Itching and Rash    Doxycycline Rash    Latex Rash    Penicillin V Rash     Reaction occurred as a child. Patient tolerated Zosyn 6/2018, Cefazolin 10/2018, and has also tolerated Augmentin.    Penicillins Rash     Reaction occurred as a child. Patient tolerated Zosyn 6/2018, Cefazolin 10/2018, and has also tolerated Augmentin.       Labs Reviewed by provider: Heme 2, BMP, HgbA1c     Verbal Order/Direction given by Provider: No new orders.      Provider giving Order:  Isabel Stephens MD    Verbal Order given to: Isela([phone])    Ever Jarquin RN

## 2024-01-12 NOTE — TELEPHONE ENCOUNTER
Marie was updated that Heather does not go to any other clinics. Currently there is no NP at [place]. Informed her she can ask the TCU is there is a wound care provider that comes there.     She stated that they want to stay with Heather. She wanted to updated Heather that pt got new Prevalon Boots.

## 2024-01-16 NOTE — TELEPHONE ENCOUNTER
Writer spoke with sister Mulu. She stated that the HUC at [facility] cancelled his ride on 2/6/24 because he was going to get in house care to follow the wound. The sister stated that they want to continue the wound care through Heather. Informed her that it's up to them who they would like to follow for the wound care. She will call [facility] and update them that they want to keep the appointment with Heather.    Writer suggested that they see Heather on 2/6, discuss with Heather if in house provider is appropriate to see with his wounds. She stated that the reason they don't want to follow Heather is due to cost of rides.

## 2024-02-16 ENCOUNTER — NURSING HOME VISIT (OUTPATIENT)
Dept: GERIATRICS | Facility: CLINIC | Age: 79
End: 2024-02-16
Payer: MEDICARE

## 2024-02-16 VITALS
HEART RATE: 72 BPM | RESPIRATION RATE: 18 BRPM | WEIGHT: 191.4 LBS | OXYGEN SATURATION: 98 % | DIASTOLIC BLOOD PRESSURE: 62 MMHG | BODY MASS INDEX: 27.46 KG/M2 | TEMPERATURE: 98.2 F | SYSTOLIC BLOOD PRESSURE: 140 MMHG

## 2024-02-16 DIAGNOSIS — Z98.890 S/P FOOT SURGERY, RIGHT: ICD-10-CM

## 2024-02-16 DIAGNOSIS — H57.89 EYE IRRITATION: ICD-10-CM

## 2024-02-16 DIAGNOSIS — I73.9 PAD (PERIPHERAL ARTERY DISEASE) (H): ICD-10-CM

## 2024-02-16 DIAGNOSIS — H10.9 BACTERIAL CONJUNCTIVITIS OF LEFT EYE: ICD-10-CM

## 2024-02-16 DIAGNOSIS — I87.2 VENOUS STASIS DERMATITIS OF BOTH LOWER EXTREMITIES: Primary | ICD-10-CM

## 2024-02-16 DIAGNOSIS — I73.9 PERIPHERAL VASCULAR DISEASE (H): ICD-10-CM

## 2024-02-16 DIAGNOSIS — E11.42 POLYNEUROPATHY DUE TO TYPE 2 DIABETES MELLITUS (H): ICD-10-CM

## 2024-02-16 DIAGNOSIS — F03.90 DEMENTIA, UNSPECIFIED DEMENTIA SEVERITY, UNSPECIFIED DEMENTIA TYPE, UNSPECIFIED WHETHER BEHAVIORAL, PSYCHOTIC, OR MOOD DISTURBANCE OR ANXIETY (H): ICD-10-CM

## 2024-02-16 PROCEDURE — 99310 SBSQ NF CARE HIGH MDM 45: CPT | Performed by: NURSE PRACTITIONER

## 2024-02-16 NOTE — PROGRESS NOTES
"[facility]  Chief Complaint   Patient presents with    [facility] MRN: [id number] [facility] [[id number]] HPI: Ever Crane  is 78 year old (1945), who is being seen today for a federally mandated E/M visit.  HPI information obtained from: facility chart records, facility staff, patient report, [facility] chart review, and Care Everywhere [facility] chart review. Today's concerns are:    Ever seen today at the request of nursing, who reported a significant flare in his bilateral lower extremities and an issue with his left eye.  Today, Ever says his eye has a lot of drainage, this began a couple days ago.  He denies a fever, new shortness of breath, or any new allergy type of symptoms.  His right eye is absolutely fine.  He denies vision changes.  He thinks he got some cream in his eye, but he is not sure.  He says his legs are starting to \"burn\" and he shows provider a butter knife which he has been using to apply Vanicream to both extremities.  He also shows provider packets of Aquaphor (tiny sample packets) that he is applying on his hands and then wearing rubber gloves for after.  He says he has some itchiness in both his hands and legs.  There is some drainage, especially from the right leg.    PMH/PSH, allergies reviewed in [facility] today.  MEDICATIONS:  Current Outpatient Medications   Medication Sig Dispense Refill    acetaminophen (TYLENOL) 325 MG tablet 1000 mg BID scheduled and 650 mg bid prn      albuterol (PROAIR HFA/PROVENTIL HFA/VENTOLIN HFA) 108 (90 Base) MCG/ACT inhaler Inhale 2 puffs into the lungs 2 times daily as needed      cetirizine (ZYRTEC) 5 MG tablet Take 5 mg by mouth daily      clopidogrel (PLAVIX) 75 MG tablet Take 75 mg by mouth daily      clotrimazole-betamethasone (LOTRISONE) 1-0.05 % external cream Apply topically every 48 hours Apply to legs every other day 45 g 3    dulaglutide (TRULICITY) " 0.75 MG/0.5ML pen Inject 1.5 mg Subcutaneous every 7 days      furosemide (LASIX) 40 MG tablet Take 40 mg by mouth daily      gabapentin (NEURONTIN) 100 MG capsule Take 200 mg by mouth 2 times daily      glimepiride (AMARYL) 1 MG tablet Take 1 mg by mouth every morning (before breakfast)      hypromellose (ARTIFICIAL TEARS) 0.5 % SOLN ophthalmic solution Place 1 drop Into the left eye every hour as needed for dry eyes      ketoconazole (NIZORAL) 2 % external shampoo Apply topically twice a week Mon and Fri.      losartan (COZAAR) 25 MG tablet Take 12.5 mg by mouth daily      mupirocin (BACTROBAN) 2 % external cream Apply topically every 48 hours Apply to BLE every other day 30 g 3    mupirocin (BACTROBAN) 2 % external ointment       naloxone (NARCAN) 0.4 MG/ML injection Inject 0.1-0.4 mg into the muscle      omeprazole (PRILOSEC) 20 MG CR capsule Take 20 mg by mouth daily      rivaroxaban ANTICOAGULANT (XARELTO) 15 MG TABS tablet Take 20 mg by mouth daily      simvastatin (ZOCOR) 40 MG tablet Take 40 mg by mouth At Bedtime      spironolactone (ALDACTONE) 25 MG tablet Take 25 mg by mouth daily      tamsulosin (FLOMAX) 0.4 MG capsule Take 0.4 mg by mouth daily      XARELTO ANTICOAGULANT 20 MG TABS tablet        ROS: Limited secondary to cognitive impairment but today pt reports the above and 4 point ROS including Respiratory, CV, GI and , other than that noted in the HPI, is negative.    Vitals: BP (!) 140/62   Pulse 72   Temp 98.2  F (36.8  C)   Resp 18   Wt 86.8 kg (191 lb 6.4 oz)   SpO2 98%   BMI 27.46 kg/m    Exam:  GENERAL APPEARANCE: Alert, in no distress, cooperative.   ENT: Mouth/posterior oropharynx intact w/ moist mucous membranes, hearing acuity [place].  EYES: EOM, conjunctivae, lids, pupils and irises normal, PERRL2.   RESP: Respiratory effort good, no respiratory distress, On RA.   CV: Edema 1+ BLE.  SKIN: Inspection/Palpation of skin and subcutaneous tissue baseline w/ fragility.  Wounds and rashes  are as pictured here:  Right leg:    Left leg:    Left hand:      NEURO: CN II-XII at patient's baseline, sensation baseline PPS.  PSYCH: Insight, judgement, and memory are impaired at baseline, affect and mood are happy/engaged.    Lab/Diagnostic data: Recent labs in Psychiatric reviewed by me today.     ASSESSMENT/PLAN  Venous stasis dermatitis of both lower extremities  Eye irritation  Bacterial conjunctivitis of left eye  Peripheral vascular disease (H24)  PAD (peripheral artery disease) (H24)  S/P foot surgery, right  Polyneuropathy due to type 2 diabetes mellitus (H)  Dementia, unspecified dementia severity, unspecified dementia type, unspecified whether behavioral, psychotic, or mood disturbance or anxiety (H)  Acute on chronic.  Exacerbated.  Provider reviewed records from facility, and interpreted most recent imaging/lab work, and vital signs.  It appears that Ever has been using a butter knife to apply creams and mix creams and ointments together at bedside without nursing instruction and in addition to what nursing has been doing.  He likely does this secondary to his dementia, and did not realize conditional change secondary to polyneuropathy.  As usual, his venous stasis dermatitis and associated ulcerations may be difficult to heal secondary to diabetes, known PVD/PAD.  Will start by removing biofilm by doing Hibiclens washes for several days.  Given itching and burning, believe that we have dermatitis that must be called.  Will start triamcinolone cream.  Mild drainage is present at several sites, and therefore will cover with Telfa and wrap with Kerlix.  Unclear if Ever got his topical concoction into his eye.  However, notable left eye drainage which is brown, with significant erythema.  He has previously needed treatment for this.  Will start with compresses and erythromycin as noted below. Of note, he is already on Zyrtec.   Ever should no longer apply Vanicream or Aquaphor which he has been apparently  putting in strange places.  We may need to apply triamcinolone to the left hand as well, but will stop any applications for now and just have Ever wash and dry his hands as normal.  Follow-up within 1 week or as needed.    Orders:  Warm/moist compress to left eye TID x 7 days. Dx: conjunctivitis.  Erythromycin opth, 0.5%, apply 1cm ribbon to left eye TID x 7 days. Dx: conjunctivitis.   BLE Care:  Wash BLE w/ hibaclens Qday x 3 days (then switch to soap/H2O.   Apply Triamcinolone 0.1% to BLE (avoiding open area on right ankle) BID x 5 days.   Cover w/ telfa and wrap w/ Kerlix.   4. Discontinue Aquaphor.  5. Discontinue Vanicream.     Electronically signed by:  Dr. Yanna Dozier, APRN CNP DNP

## 2024-02-16 NOTE — LETTER
"    2/16/2024        RE: Ever Crane  [address]  [address]  [place] [address]        [facility]  Chief Complaint   Patient presents with     group home Regulatory   [employer] MRN: [id number] Place of Service where encounter took place:  [facility] [place] ([facility]) [[id number]] HPI: Ever Crane  is 78 year old (1945), who is being seen today for a federally mandated E/M visit.  HPI information obtained from: facility chart records, facility staff, patient report, [facility] chart review, and Care Everywhere [facility] chart review. Today's concerns are:    Ever seen today at the request of nursing, who reported a significant flare in his bilateral lower extremities and an issue with his left eye.  Today, Ever says his eye has a lot of drainage, this began a couple days ago.  He denies a fever, new shortness of breath, or any new allergy type of symptoms.  His right eye is absolutely fine.  He denies vision changes.  He thinks he got some cream in his eye, but he is not sure.  He says his legs are starting to \"burn\" and he shows provider a butter knife which he has been using to apply Vanicream to both extremities.  He also shows provider packets of Aquaphor (tiny sample packets) that he is applying on his hands and then wearing rubber gloves for after.  He says he has some itchiness in both his hands and legs.  There is some drainage, especially from the right leg.    PMH/PSH, allergies reviewed in [facility] today.  MEDICATIONS:  Current Outpatient Medications   Medication Sig Dispense Refill     acetaminophen (TYLENOL) 325 MG tablet 1000 mg BID scheduled and 650 mg bid prn       albuterol (PROAIR HFA/PROVENTIL HFA/VENTOLIN HFA) 108 (90 Base) MCG/ACT inhaler Inhale 2 puffs into the lungs 2 times daily as needed       cetirizine (ZYRTEC) 5 MG tablet Take 5 mg by mouth daily       clopidogrel (PLAVIX) 75 MG tablet Take 75 mg by mouth daily       clotrimazole-betamethasone (LOTRISONE) 1-0.05 % " external cream Apply topically every 48 hours Apply to legs every other day 45 g 3     dulaglutide (TRULICITY) 0.75 MG/0.5ML pen Inject 1.5 mg Subcutaneous every 7 days       furosemide (LASIX) 40 MG tablet Take 40 mg by mouth daily       gabapentin (NEURONTIN) 100 MG capsule Take 200 mg by mouth 2 times daily       glimepiride (AMARYL) 1 MG tablet Take 1 mg by mouth every morning (before breakfast)       hypromellose (ARTIFICIAL TEARS) 0.5 % SOLN ophthalmic solution Place 1 drop Into the left eye every hour as needed for dry eyes       ketoconazole (NIZORAL) 2 % external shampoo Apply topically twice a week Mon and Fri.       losartan (COZAAR) 25 MG tablet Take 12.5 mg by mouth daily       mupirocin (BACTROBAN) 2 % external cream Apply topically every 48 hours Apply to BLE every other day 30 g 3     mupirocin (BACTROBAN) 2 % external ointment        naloxone (NARCAN) 0.4 MG/ML injection Inject 0.1-0.4 mg into the muscle       omeprazole (PRILOSEC) 20 MG CR capsule Take 20 mg by mouth daily       rivaroxaban ANTICOAGULANT (XARELTO) 15 MG TABS tablet Take 20 mg by mouth daily       simvastatin (ZOCOR) 40 MG tablet Take 40 mg by mouth At Bedtime       spironolactone (ALDACTONE) 25 MG tablet Take 25 mg by mouth daily       tamsulosin (FLOMAX) 0.4 MG capsule Take 0.4 mg by mouth daily       XARELTO ANTICOAGULANT 20 MG TABS tablet        ROS: Limited secondary to cognitive impairment but today pt reports the above and 4 point ROS including Respiratory, CV, GI and , other than that noted in the HPI, is negative.    Vitals: BP (!) 140/62   Pulse 72   Temp 98.2  F (36.8  C)   Resp 18   Wt 86.8 kg (191 lb 6.4 oz)   SpO2 98%   BMI 27.46 kg/m    Exam:  GENERAL APPEARANCE: Alert, in no distress, cooperative.   ENT: Mouth/posterior oropharynx intact w/ moist mucous membranes, hearing acuity [place].  EYES: EOM, conjunctivae, lids, pupils and irises normal, PERRL2.   RESP: Respiratory effort good, no respiratory distress, On  RA.   CV: Edema 1+ BLE.  SKIN: Inspection/Palpation of skin and subcutaneous tissue baseline w/ fragility.  Wounds and rashes are as pictured here:  Right leg:    Left leg:    Left hand:      NEURO: CN II-XII at patient's baseline, sensation baseline PPS.  PSYCH: Insight, judgement, and memory are impaired at baseline, affect and mood are happy/engaged.    Lab/Diagnostic data: Recent labs in [facility] reviewed by me today.     ASSESSMENT/PLAN  Venous stasis dermatitis of both lower extremities  Eye irritation  Bacterial conjunctivitis of left eye  Peripheral vascular disease (H24)  PAD (peripheral artery disease) (H24)  S/P foot surgery, right  Polyneuropathy due to type 2 diabetes mellitus (H)  Dementia, unspecified dementia severity, unspecified dementia type, unspecified whether behavioral, psychotic, or mood disturbance or anxiety (H)  Acute on chronic.  Exacerbated.  Provider reviewed records from facility, and interpreted most recent imaging/lab work, and vital signs.  It appears that Ever has been using a butter knife to apply creams and mix creams and ointments together at bedside without nursing instruction and in addition to what nursing has been doing.  He likely does this secondary to his dementia, and did not realize conditional change secondary to polyneuropathy.  As usual, his venous stasis dermatitis and associated ulcerations may be difficult to heal secondary to diabetes, known PVD/PAD.  Will start by removing biofilm by doing Hibiclens washes for several days.  Given itching and burning, believe that we have dermatitis that must be called.  Will start triamcinolone cream.  Mild drainage is present at several sites, and therefore will cover with Telfa and wrap with Kerlix.  Unclear if Ever got his topical concoction into his eye.  However, notable left eye drainage which is brown, with significant erythema.  He has previously needed treatment for this.  Will start with compresses and erythromycin as  noted below. Of note, he is already on Zyrtec.   Ever should no longer apply Vanicream or Aquaphor which he has been apparently putting in strange places.  We may need to apply triamcinolone to the left hand as well, but will stop any applications for now and just have Ever wash and dry his hands as normal.  Follow-up within 1 week or as needed.    Orders:  Warm/moist compress to left eye TID x 7 days. Dx: conjunctivitis.  Erythromycin opth, 0.5%, apply 1cm ribbon to left eye TID x 7 days. Dx: conjunctivitis.   BLE Care:  Wash BLE w/ hibaclens Qday x 3 days (then switch to soap/H2O.   Apply Triamcinolone 0.1% to BLE (avoiding open area on right ankle) BID x 5 days.   Cover w/ telfa and wrap w/ Kerlix.   4. Discontinue Aquaphor.  5. Discontinue Vanicream.     Electronically signed by:  Dr. Yanna Dozier, KEVIN CNP DNP      Sincerely,        KEVIN Bansal CNP

## 2024-02-22 NOTE — PROGRESS NOTES
[facility] GERIATRIC SERVICE  Episodic/Acute/Follow-Up  [place] MRN: [id number]. Place of Service where encounter took place:  [facility] [place] ([facility]) [[id number]]   Chief Complaint   Patient presents with    RECHECK    HPI: Ever Crane  is a 78 year old (1945), who is being seen today for an episodic care visit. Today's concern is:    Ever seen today on follow-up after we initiated topical treatment for stasis dermatitis, and a left eye bacterial conjunctivitis.  He was seen by the in-house wound care team this morning who endorsed significant improvements in his surgical sites and venous stasis sites.    Today, Ever says his legs are no longer burning or itching like they were before, he has noticed less drainage and does not even feel like they need to be covered all the time.  His eye feels much better, but he notes it is still red.  He does not have much of an appetite, because he says he is sick of the food.  He denies nausea.    Past Medical and Surgical History reviewed in Epic today.  MEDICATIONS:  Current Outpatient Medications   Medication Sig Dispense Refill    acetaminophen (TYLENOL) 325 MG tablet 1000 mg BID scheduled and 650 mg bid prn      albuterol (PROAIR HFA/PROVENTIL HFA/VENTOLIN HFA) 108 (90 Base) MCG/ACT inhaler Inhale 2 puffs into the lungs 2 times daily as needed      cetirizine (ZYRTEC) 5 MG tablet Take 5 mg by mouth daily      clopidogrel (PLAVIX) 75 MG tablet Take 75 mg by mouth daily      dulaglutide (TRULICITY) 0.75 MG/0.5ML pen Inject 1.5 mg Subcutaneous every 7 days      furosemide (LASIX) 40 MG tablet Take 40 mg by mouth daily      gabapentin (NEURONTIN) 100 MG capsule Take 200 mg by mouth 2 times daily      glimepiride (AMARYL) 1 MG tablet Take 1 mg by mouth every morning (before breakfast)      hypromellose (ARTIFICIAL TEARS) 0.5 % SOLN ophthalmic solution Place 1 drop Into the left eye every hour as needed for dry eyes      ketoconazole (NIZORAL) 2 % external shampoo Apply  topically twice a week Mon and Fri.      losartan (COZAAR) 25 MG tablet Take 12.5 mg by mouth daily      naloxone (NARCAN) 0.4 MG/ML injection Inject 0.1-0.4 mg into the muscle      omeprazole (PRILOSEC) 20 MG CR capsule Take 20 mg by mouth daily      rivaroxaban ANTICOAGULANT (XARELTO) 15 MG TABS tablet Take 20 mg by mouth daily      simvastatin (ZOCOR) 40 MG tablet Take 40 mg by mouth At Bedtime      spironolactone (ALDACTONE) 25 MG tablet Take 25 mg by mouth daily      tamsulosin (FLOMAX) 0.4 MG capsule Take 0.4 mg by mouth daily       Objective: /58   Pulse 52   Temp 97.5  F (36.4  C)   Resp 17   Wt 84.8 kg (187 lb)   SpO2 99%   BMI 26.83 kg/m    Exam:  GENERAL APPEARANCE: Alert, in no distress, cooperative.   ENT/Eyes: Noting left lower lid ectropion with some mild injection to conjunctiva.  RESP: Respiratory effort good, no respiratory distress, On RA.   CV: Edema 1+ BLE.   Skin: Bilateral lower extremities continue to have scabs/scales present, but no more weeping sites are present, skin is not raw or red.  No picture even warranted, significantly improved.  PSYCH: Insight, judgement, and memory are impaired at baseline, affect and mood are happy/engaged.    Labs: Labs done in facility are in EPIC. Please refer to them using [employer]/Care Everywhere.    ASSESSMENT/PLAN:  Venous stasis dermatitis of both lower extremities  Eye irritation  Bacterial conjunctivitis of left eye  Peripheral vascular disease (H24)  PAD (peripheral artery disease) (H24)  Acute on chronic.  Ongoing  Provider reviewed records from facility, and interpreted most recent imaging/lab work, and vital signs.  Legs are improving, but Ever does continue to have flareups of his dermatitis secondary to strange personal hygiene habits, and the application of inappropriate items to his lower extremities.  He will continually be followed by wound care team, who will alert provider of changes or flareups.  Conjunctivitis is in process of  resolving, and new left lower lid ectropion is present.  This may resolve with some time, and Ever still has 1 more day of antibiotics.  Follow up if no resolution/or if flareup returns, routinely, or as needed.    Orders:  No new orders.    Electronically signed by:  KEVIN Wen CNP DNP

## 2024-02-23 ENCOUNTER — TRANSITIONAL CARE UNIT VISIT (OUTPATIENT)
Dept: GERIATRICS | Facility: CLINIC | Age: 79
End: 2024-02-23
Payer: MEDICARE

## 2024-02-23 VITALS
TEMPERATURE: 97.5 F | RESPIRATION RATE: 17 BRPM | BODY MASS INDEX: 26.83 KG/M2 | DIASTOLIC BLOOD PRESSURE: 58 MMHG | OXYGEN SATURATION: 99 % | WEIGHT: 187 LBS | HEART RATE: 52 BPM | SYSTOLIC BLOOD PRESSURE: 123 MMHG

## 2024-02-23 DIAGNOSIS — H10.9 BACTERIAL CONJUNCTIVITIS OF LEFT EYE: ICD-10-CM

## 2024-02-23 DIAGNOSIS — I73.9 PERIPHERAL VASCULAR DISEASE (H): ICD-10-CM

## 2024-02-23 DIAGNOSIS — H57.89 EYE IRRITATION: ICD-10-CM

## 2024-02-23 DIAGNOSIS — I87.2 VENOUS STASIS DERMATITIS OF BOTH LOWER EXTREMITIES: Primary | ICD-10-CM

## 2024-02-23 DIAGNOSIS — I73.9 PAD (PERIPHERAL ARTERY DISEASE) (H): ICD-10-CM

## 2024-02-23 PROCEDURE — 99309 SBSQ NF CARE MODERATE MDM 30: CPT | Performed by: NURSE PRACTITIONER

## 2024-02-23 NOTE — LETTER
2/23/2024        RE: Ever Crane  [address]  [address]  [place] [address]        [facility] GERIATRIC SERVICE  Episodic/Acute/Follow-Up  [place] MRN: [id number]. Place of Service where encounter took place:  [facility] ([facility]) [[id number]]   Chief Complaint   Patient presents with     RECHECK    HPI: Ever Crane  is a 78 year old (1945), who is being seen today for an episodic care visit. Today's concern is:    Ever seen today on follow-up after we initiated topical treatment for stasis dermatitis, and a left eye bacterial conjunctivitis.  He was seen by the in-house wound care team this morning who endorsed significant improvements in his surgical sites and venous stasis sites.    Today, Ever says his legs are no longer burning or itching like they were before, he has noticed less drainage and does not even feel like they need to be covered all the time.  His eye feels much better, but he notes it is still red.  He does not have much of an appetite, because he says he is sick of the food.  He denies nausea.    Past Medical and Surgical History reviewed in Epic today.  MEDICATIONS:  Current Outpatient Medications   Medication Sig Dispense Refill     acetaminophen (TYLENOL) 325 MG tablet 1000 mg BID scheduled and 650 mg bid prn       albuterol (PROAIR HFA/PROVENTIL HFA/VENTOLIN HFA) 108 (90 Base) MCG/ACT inhaler Inhale 2 puffs into the lungs 2 times daily as needed       cetirizine (ZYRTEC) 5 MG tablet Take 5 mg by mouth daily       clopidogrel (PLAVIX) 75 MG tablet Take 75 mg by mouth daily       dulaglutide (TRULICITY) 0.75 MG/0.5ML pen Inject 1.5 mg Subcutaneous every 7 days       furosemide (LASIX) 40 MG tablet Take 40 mg by mouth daily       gabapentin (NEURONTIN) 100 MG capsule Take 200 mg by mouth 2 times daily       glimepiride (AMARYL) 1 MG tablet Take 1 mg by mouth every morning (before breakfast)       hypromellose (ARTIFICIAL TEARS) 0.5 % SOLN ophthalmic solution Place 1 drop  Into the left eye every hour as needed for dry eyes       ketoconazole (NIZORAL) 2 % external shampoo Apply topically twice a week Mon and Fri.       losartan (COZAAR) 25 MG tablet Take 12.5 mg by mouth daily       naloxone (NARCAN) 0.4 MG/ML injection Inject 0.1-0.4 mg into the muscle       omeprazole (PRILOSEC) 20 MG CR capsule Take 20 mg by mouth daily       rivaroxaban ANTICOAGULANT (XARELTO) 15 MG TABS tablet Take 20 mg by mouth daily       simvastatin (ZOCOR) 40 MG tablet Take 40 mg by mouth At Bedtime       spironolactone (ALDACTONE) 25 MG tablet Take 25 mg by mouth daily       tamsulosin (FLOMAX) 0.4 MG capsule Take 0.4 mg by mouth daily       Objective: /58   Pulse 52   Temp 97.5  F (36.4  C)   Resp 17   Wt 84.8 kg (187 lb)   SpO2 99%   BMI 26.83 kg/m    Exam:  GENERAL APPEARANCE: Alert, in no distress, cooperative.   ENT/Eyes: Noting left lower lid ectropion with some mild injection to conjunctiva.  RESP: Respiratory effort good, no respiratory distress, On RA.   CV: Edema 1+ BLE.   Skin: Bilateral lower extremities continue to have scabs/scales present, but no more weeping sites are present, skin is not raw or red.  No picture even warranted, significantly improved.  PSYCH: Insight, judgement, and memory are impaired at baseline, affect and mood are happy/engaged.    Labs: Labs done in facility are in EPIC. Please refer to them using [employer]/Care Everywhere.    ASSESSMENT/PLAN:  Venous stasis dermatitis of both lower extremities  Eye irritation  Bacterial conjunctivitis of left eye  Peripheral vascular disease (H24)  PAD (peripheral artery disease) (H24)  Acute on chronic.  Ongoing  Provider reviewed records from facility, and interpreted most recent imaging/lab work, and vital signs.  Legs are improving, but Ever does continue to have flareups of his dermatitis secondary to strange personal hygiene habits, and the application of inappropriate items to his lower extremities.  He will continually  be followed by wound care team, who will alert provider of changes or flareups.  Conjunctivitis is in process of resolving, and new left lower lid ectropion is present.  This may resolve with some time, and Ever still has 1 more day of antibiotics.  Follow up if no resolution/or if flareup returns, routinely, or as needed.    Orders:  No new orders.    Electronically signed by:  KEVIN Wen CNP DNP        Sincerely,        KEVIN Bansal CNP

## 2024-02-29 ENCOUNTER — DOCUMENTATION ONLY (OUTPATIENT)
Dept: GERIATRICS | Facility: CLINIC | Age: 79
End: 2024-02-29
Payer: MEDICARE

## 2024-03-11 ENCOUNTER — TRANSITIONAL CARE UNIT VISIT (OUTPATIENT)
Dept: GERIATRICS | Facility: CLINIC | Age: 79
End: 2024-03-11
Payer: MEDICARE

## 2024-03-11 DIAGNOSIS — H10.9 BACTERIAL CONJUNCTIVITIS OF LEFT EYE: Primary | ICD-10-CM

## 2024-03-11 DIAGNOSIS — H57.89 EYE IRRITATION: ICD-10-CM

## 2024-03-11 DIAGNOSIS — F03.90 DEMENTIA, UNSPECIFIED DEMENTIA SEVERITY, UNSPECIFIED DEMENTIA TYPE, UNSPECIFIED WHETHER BEHAVIORAL, PSYCHOTIC, OR MOOD DISTURBANCE OR ANXIETY (H): ICD-10-CM

## 2024-03-11 PROCEDURE — 99309 SBSQ NF CARE MODERATE MDM 30: CPT | Performed by: NURSE PRACTITIONER

## 2024-03-11 NOTE — LETTER
3/11/2024        RE: Ever Crane  [address]  [address]  [place] [address]        [facility] GERIATRIC SERVICE  Episodic/Acute/Follow-Up  [place] MRN: [id number]. Place of Service where encounter took place:  [facility] [place] ([facility]) [[id number]]   Chief Complaint   Patient presents with     RECHECK    HPI: Ever Crane  is a 78 year old (1945), who is being seen today for an episodic care visit. Today's concern is:    Ever seen today at the request of nursing who reports repeat onset left eye irritation.  Ever is seen with his sisters present, and reports ongoing concern with left eye.  He denies any pain with his eye, denies fever, but says he does have quite a bit of drainage.  He says he takes cold washcloths and cleans them in the sink himself, and then refuses them.  He says sometimes his eye can be a little itchy, but he thinks that is from the drainage.  The only thing he is putting in his eye is a tube of artificial tears.    Past Medical and Surgical History reviewed in Epic today.  MEDICATIONS:  Current Outpatient Medications   Medication Sig Dispense Refill     acetaminophen (TYLENOL) 325 MG tablet 1000 mg BID scheduled and 650 mg bid prn       albuterol (PROAIR HFA/PROVENTIL HFA/VENTOLIN HFA) 108 (90 Base) MCG/ACT inhaler Inhale 2 puffs into the lungs 2 times daily as needed       cetirizine (ZYRTEC) 5 MG tablet Take 5 mg by mouth daily       clopidogrel (PLAVIX) 75 MG tablet Take 75 mg by mouth daily       dulaglutide (TRULICITY) 0.75 MG/0.5ML pen Inject 1.5 mg Subcutaneous every 7 days       furosemide (LASIX) 40 MG tablet Take 40 mg by mouth daily       gabapentin (NEURONTIN) 100 MG capsule Take 200 mg by mouth 2 times daily       glimepiride (AMARYL) 1 MG tablet Take 1 mg by mouth every morning (before breakfast)       hypromellose (ARTIFICIAL TEARS) 0.5 % SOLN ophthalmic solution Place 1 drop Into the left eye every hour as needed for dry eyes       ketoconazole (NIZORAL) 2 %  "external shampoo Apply topically twice a week Mon and Fri.       losartan (COZAAR) 25 MG tablet Take 12.5 mg by mouth daily       naloxone (NARCAN) 0.4 MG/ML injection Inject 0.1-0.4 mg into the muscle       omeprazole (PRILOSEC) 20 MG CR capsule Take 20 mg by mouth daily       rivaroxaban ANTICOAGULANT (XARELTO) 15 MG TABS tablet Take 20 mg by mouth daily       simvastatin (ZOCOR) 40 MG tablet Take 40 mg by mouth At Bedtime       spironolactone (ALDACTONE) 25 MG tablet Take 25 mg by mouth daily       tamsulosin (FLOMAX) 0.4 MG capsule Take 0.4 mg by mouth daily       Objective: /58   Pulse 52   Temp 97.5  F (36.4  C)   Resp 17   Ht 1.778 m (5' 10\")   Wt 84.8 kg (187 lb)   SpO2 99%   BMI 26.83 kg/m    Exam:  GENERAL APPEARANCE: Alert, in no distress, cooperative.   ENT: left eye w/ conjunctivitis, injected sclera, sticky drainage present, mild lower lid swelling.  RESP: Respiratory effort good, no respiratory distress, On RA.   PSYCH: Insight, judgement, and memory are baseline impaired, affect and mood are happy/engaged.    Labs: Labs done in facility are in EPIC. Please refer to them using [employer]/Care Everywhere.    ASSESSMENT/PLAN:  Bacterial conjunctivitis of left eye  Eye irritation  Dementia, unspecified dementia severity, unspecified dementia type, unspecified whether behavioral, psychotic, or mood disturbance or anxiety (H)  Acute on chronic. Recurrent.   Provider reviewed records from facility, and interpreted most recent imaging/lab work, and vital signs.  This is a recurrence of bacterial conjunctivitis secondary to poor hygiene.  Provider counseled Ever with the backup of his sisters that he should allow for staff to provide hygiene to his face.  He should not allow for nursing to apply medication, and provider coordinated care with nursing around this as well.  Provider coordinated care with pharmacist, as another course of erythromycin was to be avoided given this was close to 30 days from " last occurrence.  Will trial a course of Polytrim drops as noted below with warm compress and strict directions for nursing to apply only.  Family and nursing coordinated care with housekeeping, as Ever will keep old washcloths in his room, paper towels, and reuse these.  Follow up w/in 1 week or as needed.    Orders:  Warm, moist compress TID x 7 days.   Poly-trim 0.1%, 1 gtt, left eye QID x 7 days.   Do not allow self-administration or hygiene to left eye.   Dx: bacterial conjunctivitis.     Electronically signed by:  Dr. Yanna Dozier, APRN CNP DNP        Sincerely,        Yanna Dozier, KEVIN CNP

## 2024-03-11 NOTE — PROGRESS NOTES
[facility] GERIATRIC SERVICE  Episodic/Acute/Follow-Up  [place] MRN: [id number]. Place of Service where encounter took place:  [facility] [place] ([facility]) [[id number]]   Chief Complaint   Patient presents with    RECHECK    HPI: Ever Crane  is a 78 year old (1945), who is being seen today for an episodic care visit. Today's concern is:    Ever seen today at the request of nursing who reports repeat onset left eye irritation.  Ever is seen with his sisters present, and reports ongoing concern with left eye.  He denies any pain with his eye, denies fever, but says he does have quite a bit of drainage.  He says he takes cold washcloths and cleans them in the sink himself, and then refuses them.  He says sometimes his eye can be a little itchy, but he thinks that is from the drainage.  The only thing he is putting in his eye is a tube of artificial tears.    Past Medical and Surgical History reviewed in Epic today.  MEDICATIONS:  Current Outpatient Medications   Medication Sig Dispense Refill    acetaminophen (TYLENOL) 325 MG tablet 1000 mg BID scheduled and 650 mg bid prn      albuterol (PROAIR HFA/PROVENTIL HFA/VENTOLIN HFA) 108 (90 Base) MCG/ACT inhaler Inhale 2 puffs into the lungs 2 times daily as needed      cetirizine (ZYRTEC) 5 MG tablet Take 5 mg by mouth daily      clopidogrel (PLAVIX) 75 MG tablet Take 75 mg by mouth daily      dulaglutide (TRULICITY) 0.75 MG/0.5ML pen Inject 1.5 mg Subcutaneous every 7 days      furosemide (LASIX) 40 MG tablet Take 40 mg by mouth daily      gabapentin (NEURONTIN) 100 MG capsule Take 200 mg by mouth 2 times daily      glimepiride (AMARYL) 1 MG tablet Take 1 mg by mouth every morning (before breakfast)      hypromellose (ARTIFICIAL TEARS) 0.5 % SOLN ophthalmic solution Place 1 drop Into the left eye every hour as needed for dry eyes      ketoconazole (NIZORAL) 2 % external shampoo Apply topically twice a week Mon and Fri.      losartan (COZAAR) 25 MG tablet Take 12.5 mg  "by mouth daily      naloxone (NARCAN) 0.4 MG/ML injection Inject 0.1-0.4 mg into the muscle      omeprazole (PRILOSEC) 20 MG CR capsule Take 20 mg by mouth daily      rivaroxaban ANTICOAGULANT (XARELTO) 15 MG TABS tablet Take 20 mg by mouth daily      simvastatin (ZOCOR) 40 MG tablet Take 40 mg by mouth At Bedtime      spironolactone (ALDACTONE) 25 MG tablet Take 25 mg by mouth daily      tamsulosin (FLOMAX) 0.4 MG capsule Take 0.4 mg by mouth daily       Objective: /58   Pulse 52   Temp 97.5  F (36.4  C)   Resp 17   Ht 1.778 m (5' 10\")   Wt 84.8 kg (187 lb)   SpO2 99%   BMI 26.83 kg/m    Exam:  GENERAL APPEARANCE: Alert, in no distress, cooperative.   ENT: left eye w/ conjunctivitis, injected sclera, sticky drainage present, mild lower lid swelling.  RESP: Respiratory effort good, no respiratory distress, On RA.   PSYCH: Insight, judgement, and memory are baseline impaired, affect and mood are happy/engaged.    Labs: Labs done in facility are in EPIC. Please refer to them using [employer] Everywhere.    ASSESSMENT/PLAN:  Bacterial conjunctivitis of left eye  Eye irritation  Dementia, unspecified dementia severity, unspecified dementia type, unspecified whether behavioral, psychotic, or mood disturbance or anxiety (H)  Acute on chronic. Recurrent.   Provider reviewed records from facility, and interpreted most recent imaging/lab work, and vital signs.  This is a recurrence of bacterial conjunctivitis secondary to poor hygiene.  Provider counseled Ever with the backup of his sisters that he should allow for staff to provide hygiene to his face.  He should not allow for nursing to apply medication, and provider coordinated care with nursing around this as well.  Provider coordinated care with pharmacist, as another course of erythromycin was to be avoided given this was close to 30 days from last occurrence.  Will trial a course of Polytrim drops as noted below with warm compress and strict directions for " nursing to apply only.  Family and nursing coordinated care with housekeeping, as Ever will keep old washcloths in his room, paper towels, and reuse these.  Follow up w/in 1 week or as needed.    Orders:  Warm, moist compress TID x 7 days.   Poly-trim 0.1%, 1 gtt, left eye QID x 7 days.   Do not allow self-administration or hygiene to left eye.   Dx: bacterial conjunctivitis.     Electronically signed by:  KEVIN Wen CNP DNP

## 2024-03-13 VITALS
SYSTOLIC BLOOD PRESSURE: 123 MMHG | WEIGHT: 187 LBS | HEIGHT: 70 IN | RESPIRATION RATE: 17 BRPM | BODY MASS INDEX: 26.77 KG/M2 | HEART RATE: 52 BPM | TEMPERATURE: 97.5 F | DIASTOLIC BLOOD PRESSURE: 58 MMHG | OXYGEN SATURATION: 99 %

## 2024-03-20 ENCOUNTER — TELEPHONE (OUTPATIENT)
Dept: VASCULAR SURGERY | Facility: CLINIC | Age: 79
End: 2024-03-20
Payer: MEDICARE

## 2024-03-20 NOTE — TELEPHONE ENCOUNTER
Spoke with LK, pt has not been seen since January, ok to give lotion recommendations- ok to use cetaphil, vanicream, aquaphor, etc. Updated luis, she verbalized understanding.

## 2024-03-20 NOTE — TELEPHONE ENCOUNTER
Caller: Marie, sister    Provider: DANIELA Robertson    Detailed reason for call: Asking for a recommendation of an OTC moisturizer for legs and hands that does not have Lanolin in it.  She states the patient is allergic to Lanolin.  She was told the by the [facility] center to buy any kind of over the counter moisturizer but she would like to know what Heather gregg recommend. She states the pharmacy at [facility] suggested vasoline, but she didn't think that was right.     Best phone number to contact: [phone]    Best time to contact: any    Ok to leave a detailed message: Yes    Ok to speak to authorized person if needed: No      (Noted to patient if reason is related to wound or incision, to please send a photo via email or [employer].)

## 2024-04-03 NOTE — PROGRESS NOTES
[place] [facility]ERIATRIC SERVICES  Chief Complaint   Patient presents with    FDC Regulatory     [facility] Record Number:  [id number]  Place of Service where encounter took place:  [facility] [place] () [[id number]]    HPI:    Ever Crane  is 78 year old (1945), who is being seen today for a federally mandated E/M visit.  HPI information obtained from: facility chart records, facility staff, patient report and [facility] chart review.     Today's concerns are:    - Resident seen and examined, chart reviewed and discussed with the nursing staff.   -Patient reports that his left leg get infected again and now he has 2 wounds which are draining.  He reports that wound care nurse managing.  - He reported been having eczema over his hands left more than the right but is starting to get better.  He applies lotion that he has at the bedside.  Reports history of eczema in the past runs in his family as well.    -Reports chronic left eye inflammation.  He has eyedrop that he uses at the bedside.    - DNP and RN have no concern today.   --------------------------------    MEDICATIONS:  Current Outpatient Medications   Medication Sig Dispense Refill    acetaminophen (TYLENOL) 325 MG tablet 1000 mg BID scheduled and 650 mg bid prn      albuterol (PROAIR HFA/PROVENTIL HFA/VENTOLIN HFA) 108 (90 Base) MCG/ACT inhaler Inhale 2 puffs into the lungs 2 times daily as needed      cetirizine (ZYRTEC) 5 MG tablet Take 5 mg by mouth daily      clopidogrel (PLAVIX) 75 MG tablet Take 75 mg by mouth daily      dulaglutide (TRULICITY) 0.75 MG/0.5ML pen Inject 1.5 mg Subcutaneous every 7 days      furosemide (LASIX) 40 MG tablet Take 40 mg by mouth daily      gabapentin (NEURONTIN) 100 MG capsule Take 200 mg by mouth 2 times daily      glimepiride (AMARYL) 1 MG tablet Take 1 mg by mouth every morning (before breakfast)      hypromellose (ARTIFICIAL TEARS) 0.5 % SOLN ophthalmic solution Place 1 drop Into the left eye every hour  "as needed for dry eyes      ketoconazole (NIZORAL) 2 % external shampoo Apply topically twice a week Mon and Fri.      losartan (COZAAR) 25 MG tablet Take 12.5 mg by mouth daily      naloxone (NARCAN) 0.4 MG/ML injection Inject 0.1-0.4 mg into the muscle      omeprazole (PRILOSEC) 20 MG CR capsule Take 20 mg by mouth daily      rivaroxaban ANTICOAGULANT (XARELTO) 15 MG TABS tablet Take 20 mg by mouth daily      simvastatin (ZOCOR) 40 MG tablet Take 40 mg by mouth At Bedtime      spironolactone (ALDACTONE) 25 MG tablet Take 25 mg by mouth daily      tamsulosin (FLOMAX) 0.4 MG capsule Take 0.4 mg by mouth daily       Case Management:  I have reviewed the care plan and MDS and do agree with the plan. Patient's desire to return to the community is present, but is not able due to care needs . Information reviewed:  Medications, vital signs, orders, and nursing notes.    ROS: 4 point ROS including Respiratory, CV, GI and , other than that noted in the HPI,  is negative    Vitals:  /68   Pulse 64   Temp 97.7  F (36.5  C)   Resp 16   Ht 1.778 m (5' 10\")   Wt 87.5 kg (192 lb 14.4 oz)   SpO2 94%   BMI 27.68 kg/m    Body mass index is 27.68 kg/m .  Exam:  GENERAL APPEARANCE:  in no distress,   Eye: Loss of lashes on the lower part bilaterally.  Congested conjunctiva distal 1 on the left side.  Dry skin yellowish drainage on the lower eyelid.  RESP:  Unlabored breathing. CTA b/l.   CV:  S1S2 audible, regular HR, no murmur appreciated.   ABDOMEN:  soft, NT/ND, tympanic on percussion over epigastric, and LUQ. BS audible.   M/S:   amputated  distal feet  SKIN: Erythematous and scaly thick skin over left hand mainly on the dorsal surface on the right hand is milder.  Both legs are wrapped the 1 on the left side there are some soft area of the dressing.  The proximal part of the left leg on the medial surface are uncovered and there are multiple scab and some scaly skin that is dry.  NEURO:   No NFD appreciated on " observation.   PSYCH:  affect and mood normal    Lab/Diagnostic data:  Reviewed in the chart and EHR.          ASSESSMENT/PLAN  --------------------------  (E11.59,  Z79.4) Type 2 diabetes mellitus with other circulatory complication, without long-term current use of insulin (H)    (E11.42) Polyneuropathy due to type 2 diabetes mellitus (H)   A1C 7.5 10/16/2023    A1C 6.6 05/24/2023    A1C 6.7 05/15/2023   - controlled. On Trulicity.continue.       (I50.30) Heart failure with preserved ejection fraction, NYHA class II (H)  (I48.19) Persistent atrial fibrillation (H)  (I10) Essential hypertension  - cardiac wise compensated.   - CVR, not on rate controlled.   - on Xarelto  for cva ppx.     (J44.9) Chronic obstructive pulmonary disease, unspecified COPD type (H)  - pulmonary wise compensated    (I73.9) PAD (peripheral artery disease) (H)  (Z98.890) S/P foot surgery, right  Wound over legs  4/27: S/P Transmetatarsal amputation of right foot, Rt  Achillis tendon lengthening, excisional debridement ulceration rt heel. Cx grew MSSA  5/15: Rt calcaneus I &D, Cx grew enterobacter, enterococcus  6/1/23:  debridement wound 1st metatarsal.   7/7: wound debrided and wound vac started.   8/2/23: ID visit, continue  dapto through 8/4. zosyn completed on 7/13  -Current wound over left leg, wound care in place.    Left conjunctivitis.  - Patient has loss of eyelashes bilaterally.  Was treated with Polytrim 0.1% for 7 days on 3/11  - Patient uses artificial tears.    Bilateral hands eczema:  - Severe on the left side.  Patient been using topical lotion with slight improvement.  Will start hydrocortisone 2.5% 3 times daily until resolved (MAX 10 Days) , if no  satisfied result, consider higher potency.           Electronically signed by:  Isabel Stephens MD

## 2024-04-04 ENCOUNTER — NURSING HOME VISIT (OUTPATIENT)
Dept: GERIATRICS | Facility: CLINIC | Age: 79
End: 2024-04-04
Payer: MEDICARE

## 2024-04-04 VITALS
DIASTOLIC BLOOD PRESSURE: 68 MMHG | SYSTOLIC BLOOD PRESSURE: 136 MMHG | OXYGEN SATURATION: 94 % | WEIGHT: 192.9 LBS | RESPIRATION RATE: 16 BRPM | HEART RATE: 64 BPM | BODY MASS INDEX: 27.62 KG/M2 | HEIGHT: 70 IN | TEMPERATURE: 97.7 F

## 2024-04-04 DIAGNOSIS — I73.9 PAD (PERIPHERAL ARTERY DISEASE) (H): ICD-10-CM

## 2024-04-04 DIAGNOSIS — I10 ESSENTIAL HYPERTENSION: Chronic | ICD-10-CM

## 2024-04-04 DIAGNOSIS — E11.42 POLYNEUROPATHY DUE TO TYPE 2 DIABETES MELLITUS (H): ICD-10-CM

## 2024-04-04 DIAGNOSIS — Z79.4 TYPE 2 DIABETES MELLITUS WITH OTHER CIRCULATORY COMPLICATION, WITH LONG-TERM CURRENT USE OF INSULIN (H): Primary | Chronic | ICD-10-CM

## 2024-04-04 DIAGNOSIS — L30.9 ECZEMA, UNSPECIFIED TYPE: ICD-10-CM

## 2024-04-04 DIAGNOSIS — I48.19 PERSISTENT ATRIAL FIBRILLATION (H): Chronic | ICD-10-CM

## 2024-04-04 DIAGNOSIS — Z98.890 S/P FOOT SURGERY, RIGHT: ICD-10-CM

## 2024-04-04 DIAGNOSIS — E11.59 TYPE 2 DIABETES MELLITUS WITH OTHER CIRCULATORY COMPLICATION, WITH LONG-TERM CURRENT USE OF INSULIN (H): Primary | Chronic | ICD-10-CM

## 2024-04-04 DIAGNOSIS — I50.30 HEART FAILURE WITH PRESERVED EJECTION FRACTION, NYHA CLASS II (H): ICD-10-CM

## 2024-04-04 DIAGNOSIS — J44.9 CHRONIC OBSTRUCTIVE PULMONARY DISEASE, UNSPECIFIED COPD TYPE (H): ICD-10-CM

## 2024-04-04 DIAGNOSIS — T14.8XXA OPEN WOUND: ICD-10-CM

## 2024-04-04 PROCEDURE — 99309 SBSQ NF CARE MODERATE MDM 30: CPT | Performed by: FAMILY MEDICINE

## 2024-04-04 NOTE — LETTER
4/4/2024        RE: Ever Crane  [address]  [address]  [place] [address]        [place] [facility]  Chief Complaint   Patient presents with     care home Regulatory     [place] Medical Record Number:  [id number]  Place of Service where encounter took place:  [facility] [place] () [[id number]]    HPI:    Ever Crane  is 78 year old (1945), who is being seen today for a federally mandated E/M visit.  HPI information obtained from: facility chart records, facility staff, patient report and [facility] chart review.     Today's concerns are:    - Resident seen and examined, chart reviewed and discussed with the nursing staff.   -Patient reports that his left leg get infected again and now he has 2 wounds which are draining.  He reports that wound care nurse managing.  - He reported been having eczema over his hands left more than the right but is starting to get better.  He applies lotion that he has at the bedside.  Reports history of eczema in the past runs in his family as well.    -Reports chronic left eye inflammation.  He has eyedrop that he uses at the bedside.    - DNP and RN have no concern today.   --------------------------------    MEDICATIONS:  Current Outpatient Medications   Medication Sig Dispense Refill     acetaminophen (TYLENOL) 325 MG tablet 1000 mg BID scheduled and 650 mg bid prn       albuterol (PROAIR HFA/PROVENTIL HFA/VENTOLIN HFA) 108 (90 Base) MCG/ACT inhaler Inhale 2 puffs into the lungs 2 times daily as needed       cetirizine (ZYRTEC) 5 MG tablet Take 5 mg by mouth daily       clopidogrel (PLAVIX) 75 MG tablet Take 75 mg by mouth daily       dulaglutide (TRULICITY) 0.75 MG/0.5ML pen Inject 1.5 mg Subcutaneous every 7 days       furosemide (LASIX) 40 MG tablet Take 40 mg by mouth daily       gabapentin (NEURONTIN) 100 MG capsule Take 200 mg by mouth 2 times daily       glimepiride (AMARYL) 1 MG tablet Take 1 mg by mouth every morning (before breakfast)    "    hypromellose (ARTIFICIAL TEARS) 0.5 % SOLN ophthalmic solution Place 1 drop Into the left eye every hour as needed for dry eyes       ketoconazole (NIZORAL) 2 % external shampoo Apply topically twice a week Mon and Fri.       losartan (COZAAR) 25 MG tablet Take 12.5 mg by mouth daily       naloxone (NARCAN) 0.4 MG/ML injection Inject 0.1-0.4 mg into the muscle       omeprazole (PRILOSEC) 20 MG CR capsule Take 20 mg by mouth daily       rivaroxaban ANTICOAGULANT (XARELTO) 15 MG TABS tablet Take 20 mg by mouth daily       simvastatin (ZOCOR) 40 MG tablet Take 40 mg by mouth At Bedtime       spironolactone (ALDACTONE) 25 MG tablet Take 25 mg by mouth daily       tamsulosin (FLOMAX) 0.4 MG capsule Take 0.4 mg by mouth daily       Case Management:  I have reviewed the care plan and MDS and do agree with the plan. Patient's desire to return to the community is present, but is not able due to care needs . Information reviewed:  Medications, vital signs, orders, and nursing notes.    ROS: 4 point ROS including Respiratory, CV, GI and , other than that noted in the HPI,  is negative    Vitals:  /68   Pulse 64   Temp 97.7  F (36.5  C)   Resp 16   Ht 1.778 m (5' 10\")   Wt 87.5 kg (192 lb 14.4 oz)   SpO2 94%   BMI 27.68 kg/m    Body mass index is 27.68 kg/m .  Exam:  GENERAL APPEARANCE:  in no distress,   Eye: Loss of lashes on the lower part bilaterally.  Congested conjunctiva distal 1 on the left side.  Dry skin yellowish drainage on the lower eyelid.  RESP:  Unlabored breathing. CTA b/l.   CV:  S1S2 audible, regular HR, no murmur appreciated.   ABDOMEN:  soft, NT/ND, tympanic on percussion over epigastric, and LUQ. BS audible.   M/S:   amputated  distal feet  SKIN: Erythematous and scaly thick skin over left hand mainly on the dorsal surface on the right hand is milder.  Both legs are wrapped the 1 on the left side there are some soft area of the dressing.  The proximal part of the left leg on the medial " surface are uncovered and there are multiple scab and some scaly skin that is dry.  NEURO:   No NFD appreciated on observation.   PSYCH:  affect and mood normal    Lab/Diagnostic data:  Reviewed in the chart and EHR.          ASSESSMENT/PLAN  --------------------------  (E11.59,  Z79.4) Type 2 diabetes mellitus with other circulatory complication, without long-term current use of insulin (H)    (E11.42) Polyneuropathy due to type 2 diabetes mellitus (H)   A1C 7.5 10/16/2023    A1C 6.6 05/24/2023    A1C 6.7 05/15/2023   - controlled. On Trulicity.continue.       (I50.30) Heart failure with preserved ejection fraction, NYHA class II (H)  (I48.19) Persistent atrial fibrillation (H)  (I10) Essential hypertension  - cardiac wise compensated.   - CVR, not on rate controlled.   - on Xarelto  for cva ppx.     (J44.9) Chronic obstructive pulmonary disease, unspecified COPD type (H)  - pulmonary wise compensated    (I73.9) PAD (peripheral artery disease) (H)  (Z98.890) S/P foot surgery, right  Wound over legs  4/27: S/P Transmetatarsal amputation of right foot, Rt  Achillis tendon lengthening, excisional debridement ulceration rt heel. Cx grew MSSA  5/15: Rt calcaneus I &D, Cx grew enterobacter, enterococcus  6/1/23:  debridement wound 1st metatarsal.   7/7: wound debrided and wound vac started.   8/2/23: ID visit, continue  dapto through 8/4. zosyn completed on 7/13  -Current wound over left leg, wound care in place.    Left conjunctivitis.  - Patient has loss of eyelashes bilaterally.  Was treated with Polytrim 0.1% for 7 days on 3/11  - Patient uses artificial tears.    Bilateral hands eczema:  - Severe on the left side.  Patient been using topical lotion with slight improvement.  Will start hydrocortisone 2.5% 3 times daily until resolved (MAX 10 Days) , if no  satisfied result, consider higher potency.           Electronically signed by:  Isabel Stephens MD      Sincerely,        Isabel Stephens MD

## 2024-04-21 ENCOUNTER — APPOINTMENT (OUTPATIENT)
Dept: CT IMAGING | Facility: HOSPITAL | Age: 79
DRG: 065 | End: 2024-04-21
Payer: MEDICARE

## 2024-04-21 ENCOUNTER — HOSPITAL ENCOUNTER (INPATIENT)
Facility: HOSPITAL | Age: 79
LOS: 1 days | Discharge: NURSING, REHAB OR LONG-TERM CARE | DRG: 065 | End: 2024-04-23
Attending: EMERGENCY MEDICINE | Admitting: INTERNAL MEDICINE
Payer: MEDICARE

## 2024-04-21 ENCOUNTER — APPOINTMENT (OUTPATIENT)
Dept: MRI IMAGING | Facility: HOSPITAL | Age: 79
DRG: 065 | End: 2024-04-21
Payer: MEDICARE

## 2024-04-21 DIAGNOSIS — R23.9 ALTERATION IN SKIN INTEGRITY: Primary | ICD-10-CM

## 2024-04-21 DIAGNOSIS — I63.9 INFARCTION OF LEFT BASAL GANGLIA (H): ICD-10-CM

## 2024-04-21 DIAGNOSIS — H10.32 ACUTE CONJUNCTIVITIS OF LEFT EYE, UNSPECIFIED ACUTE CONJUNCTIVITIS TYPE: ICD-10-CM

## 2024-04-21 DIAGNOSIS — I63.9 ACUTE STROKE OF BASAL GANGLIA (H): ICD-10-CM

## 2024-04-21 LAB
ALBUMIN SERPL BCG-MCNC: 4 G/DL (ref 3.5–5.2)
ALBUMIN UR-MCNC: NEGATIVE MG/DL
ALP SERPL-CCNC: 97 U/L (ref 40–150)
ALT SERPL W P-5'-P-CCNC: 10 U/L (ref 0–70)
ANION GAP SERPL CALCULATED.3IONS-SCNC: 13 MMOL/L (ref 7–15)
APPEARANCE UR: CLEAR
APTT PPP: 38 SECONDS (ref 22–38)
AST SERPL W P-5'-P-CCNC: 20 U/L (ref 0–45)
BASOPHILS # BLD AUTO: 0.1 10E3/UL (ref 0–0.2)
BASOPHILS NFR BLD AUTO: 1 %
BILIRUB DIRECT SERPL-MCNC: <0.2 MG/DL (ref 0–0.3)
BILIRUB SERPL-MCNC: 0.7 MG/DL
BILIRUB UR QL STRIP: NEGATIVE
BUN SERPL-MCNC: 37.3 MG/DL (ref 8–23)
CALCIUM SERPL-MCNC: 9.7 MG/DL (ref 8.8–10.2)
CHLORIDE SERPL-SCNC: 103 MMOL/L (ref 98–107)
CHOLEST SERPL-MCNC: 82 MG/DL
CK SERPL-CCNC: 147 U/L (ref 39–308)
COLOR UR AUTO: NORMAL
CREAT SERPL-MCNC: 1.43 MG/DL (ref 0.67–1.17)
DEPRECATED HCO3 PLAS-SCNC: 21 MMOL/L (ref 22–29)
EGFRCR SERPLBLD CKD-EPI 2021: 50 ML/MIN/1.73M2
EOSINOPHIL # BLD AUTO: 0.5 10E3/UL (ref 0–0.7)
EOSINOPHIL NFR BLD AUTO: 6 %
ERYTHROCYTE [DISTWIDTH] IN BLOOD BY AUTOMATED COUNT: 14.6 % (ref 10–15)
GLUCOSE BLDC GLUCOMTR-MCNC: 106 MG/DL (ref 70–99)
GLUCOSE BLDC GLUCOMTR-MCNC: 109 MG/DL (ref 70–99)
GLUCOSE SERPL-MCNC: 162 MG/DL (ref 70–99)
GLUCOSE UR STRIP-MCNC: NEGATIVE MG/DL
HBA1C MFR BLD: 7 %
HCT VFR BLD AUTO: 38.4 % (ref 40–53)
HDLC SERPL-MCNC: 28 MG/DL
HGB BLD-MCNC: 12.1 G/DL (ref 13.3–17.7)
HGB UR QL STRIP: NEGATIVE
IMM GRANULOCYTES # BLD: 0 10E3/UL
IMM GRANULOCYTES NFR BLD: 0 %
INR PPP: 1.61 (ref 0.85–1.15)
KETONES UR STRIP-MCNC: NEGATIVE MG/DL
LDLC SERPL CALC-MCNC: 27 MG/DL
LEUKOCYTE ESTERASE UR QL STRIP: NEGATIVE
LIPASE SERPL-CCNC: 23 U/L (ref 13–60)
LYMPHOCYTES # BLD AUTO: 1.8 10E3/UL (ref 0.8–5.3)
LYMPHOCYTES NFR BLD AUTO: 22 %
MAGNESIUM SERPL-MCNC: 2.2 MG/DL (ref 1.7–2.3)
MCH RBC QN AUTO: 26.6 PG (ref 26.5–33)
MCHC RBC AUTO-ENTMCNC: 31.5 G/DL (ref 31.5–36.5)
MCV RBC AUTO: 84 FL (ref 78–100)
MONOCYTES # BLD AUTO: 0.7 10E3/UL (ref 0–1.3)
MONOCYTES NFR BLD AUTO: 8 %
NEUTROPHILS # BLD AUTO: 5.2 10E3/UL (ref 1.6–8.3)
NEUTROPHILS NFR BLD AUTO: 63 %
NITRATE UR QL: NEGATIVE
NONHDLC SERPL-MCNC: 54 MG/DL
NRBC # BLD AUTO: 0 10E3/UL
NRBC BLD AUTO-RTO: 0 /100
NT-PROBNP SERPL-MCNC: 169 PG/ML (ref 0–1800)
PH UR STRIP: 5.5 [PH] (ref 5–7)
PLATELET # BLD AUTO: 188 10E3/UL (ref 150–450)
POTASSIUM SERPL-SCNC: 4.6 MMOL/L (ref 3.4–5.3)
PROT SERPL-MCNC: 7.9 G/DL (ref 6.4–8.3)
RADIOLOGIST FLAGS: NORMAL
RBC # BLD AUTO: 4.55 10E6/UL (ref 4.4–5.9)
RBC URINE: 1 /HPF
SODIUM SERPL-SCNC: 137 MMOL/L (ref 135–145)
SP GR UR STRIP: 1.02 (ref 1–1.03)
TRIGL SERPL-MCNC: 136 MG/DL
TROPONIN T SERPL HS-MCNC: 24 NG/L
TROPONIN T SERPL HS-MCNC: 24 NG/L
UROBILINOGEN UR STRIP-MCNC: <2 MG/DL
WBC # BLD AUTO: 8.2 10E3/UL (ref 4–11)
WBC URINE: <1 /HPF

## 2024-04-21 PROCEDURE — 82248 BILIRUBIN DIRECT: CPT

## 2024-04-21 PROCEDURE — 250N000011 HC RX IP 250 OP 636: Performed by: EMERGENCY MEDICINE

## 2024-04-21 PROCEDURE — 99222 1ST HOSP IP/OBS MODERATE 55: CPT

## 2024-04-21 PROCEDURE — 83036 HEMOGLOBIN GLYCOSYLATED A1C: CPT

## 2024-04-21 PROCEDURE — 85730 THROMBOPLASTIN TIME PARTIAL: CPT

## 2024-04-21 PROCEDURE — 85025 COMPLETE CBC W/AUTO DIFF WBC: CPT

## 2024-04-21 PROCEDURE — 83690 ASSAY OF LIPASE: CPT

## 2024-04-21 PROCEDURE — 250N000013 HC RX MED GY IP 250 OP 250 PS 637: Performed by: INTERNAL MEDICINE

## 2024-04-21 PROCEDURE — 255N000002 HC RX 255 OP 636: Performed by: EMERGENCY MEDICINE

## 2024-04-21 PROCEDURE — 70553 MRI BRAIN STEM W/O & W/DYE: CPT | Mod: MG

## 2024-04-21 PROCEDURE — 83880 ASSAY OF NATRIURETIC PEPTIDE: CPT

## 2024-04-21 PROCEDURE — 84484 ASSAY OF TROPONIN QUANT: CPT

## 2024-04-21 PROCEDURE — 80048 BASIC METABOLIC PNL TOTAL CA: CPT

## 2024-04-21 PROCEDURE — G0378 HOSPITAL OBSERVATION PER HR: HCPCS

## 2024-04-21 PROCEDURE — 83735 ASSAY OF MAGNESIUM: CPT

## 2024-04-21 PROCEDURE — A9585 GADOBUTROL INJECTION: HCPCS | Performed by: EMERGENCY MEDICINE

## 2024-04-21 PROCEDURE — 99285 EMERGENCY DEPT VISIT HI MDM: CPT | Mod: 25

## 2024-04-21 PROCEDURE — 93005 ELECTROCARDIOGRAM TRACING: CPT

## 2024-04-21 PROCEDURE — 250N000013 HC RX MED GY IP 250 OP 250 PS 637

## 2024-04-21 PROCEDURE — 85610 PROTHROMBIN TIME: CPT

## 2024-04-21 PROCEDURE — 82962 GLUCOSE BLOOD TEST: CPT

## 2024-04-21 PROCEDURE — 80061 LIPID PANEL: CPT

## 2024-04-21 PROCEDURE — 81001 URINALYSIS AUTO W/SCOPE: CPT

## 2024-04-21 PROCEDURE — 71260 CT THORAX DX C+: CPT | Mod: MG

## 2024-04-21 PROCEDURE — 82550 ASSAY OF CK (CPK): CPT

## 2024-04-21 PROCEDURE — 36415 COLL VENOUS BLD VENIPUNCTURE: CPT

## 2024-04-21 RX ORDER — GABAPENTIN 100 MG/1
200 CAPSULE ORAL 2 TIMES DAILY
Status: DISCONTINUED | OUTPATIENT
Start: 2024-04-21 | End: 2024-04-23 | Stop reason: HOSPADM

## 2024-04-21 RX ORDER — SIMVASTATIN 40 MG
40 TABLET ORAL AT BEDTIME
Status: DISCONTINUED | OUTPATIENT
Start: 2024-04-21 | End: 2024-04-23 | Stop reason: HOSPADM

## 2024-04-21 RX ORDER — TAMSULOSIN HYDROCHLORIDE 0.4 MG/1
0.4 CAPSULE ORAL DAILY
Status: DISCONTINUED | OUTPATIENT
Start: 2024-04-22 | End: 2024-04-23 | Stop reason: HOSPADM

## 2024-04-21 RX ORDER — FUROSEMIDE 20 MG
40 TABLET ORAL DAILY
Status: DISCONTINUED | OUTPATIENT
Start: 2024-04-22 | End: 2024-04-23 | Stop reason: HOSPADM

## 2024-04-21 RX ORDER — ASPIRIN 81 MG/1
81 TABLET, CHEWABLE ORAL ONCE
Status: COMPLETED | OUTPATIENT
Start: 2024-04-21 | End: 2024-04-21

## 2024-04-21 RX ORDER — SPIRONOLACTONE 25 MG/1
25 TABLET ORAL DAILY
Status: DISCONTINUED | OUTPATIENT
Start: 2024-04-22 | End: 2024-04-23 | Stop reason: HOSPADM

## 2024-04-21 RX ORDER — ASPIRIN 81 MG/1
81 TABLET ORAL DAILY
Status: DISCONTINUED | OUTPATIENT
Start: 2024-04-22 | End: 2024-04-23 | Stop reason: HOSPADM

## 2024-04-21 RX ORDER — CLOPIDOGREL BISULFATE 75 MG/1
75 TABLET ORAL DAILY
Status: DISCONTINUED | OUTPATIENT
Start: 2024-04-22 | End: 2024-04-23 | Stop reason: HOSPADM

## 2024-04-21 RX ORDER — ALBUTEROL SULFATE 90 UG/1
2 AEROSOL, METERED RESPIRATORY (INHALATION) 2 TIMES DAILY PRN
Status: DISCONTINUED | OUTPATIENT
Start: 2024-04-21 | End: 2024-04-23 | Stop reason: HOSPADM

## 2024-04-21 RX ORDER — ACETAMINOPHEN 325 MG/1
650 TABLET ORAL 2 TIMES DAILY PRN
Status: DISCONTINUED | OUTPATIENT
Start: 2024-04-21 | End: 2024-04-23 | Stop reason: HOSPADM

## 2024-04-21 RX ORDER — ACETAMINOPHEN 500 MG
1000 TABLET ORAL 2 TIMES DAILY
COMMUNITY

## 2024-04-21 RX ORDER — POLYMYXIN B SULFATE AND TRIMETHOPRIM 1; 10000 MG/ML; [USP'U]/ML
2 SOLUTION OPHTHALMIC 4 TIMES DAILY
Status: DISCONTINUED | OUTPATIENT
Start: 2024-04-21 | End: 2024-04-23 | Stop reason: HOSPADM

## 2024-04-21 RX ORDER — CETIRIZINE HYDROCHLORIDE 5 MG/1
5 TABLET ORAL DAILY
Status: DISCONTINUED | OUTPATIENT
Start: 2024-04-22 | End: 2024-04-23 | Stop reason: HOSPADM

## 2024-04-21 RX ORDER — LIDOCAINE 40 MG/G
CREAM TOPICAL
Status: DISCONTINUED | OUTPATIENT
Start: 2024-04-21 | End: 2024-04-23 | Stop reason: HOSPADM

## 2024-04-21 RX ORDER — ASPIRIN 81 MG/1
81 TABLET, CHEWABLE ORAL DAILY
Status: DISCONTINUED | OUTPATIENT
Start: 2024-04-22 | End: 2024-04-23 | Stop reason: HOSPADM

## 2024-04-21 RX ORDER — GADOBUTROL 604.72 MG/ML
9 INJECTION INTRAVENOUS ONCE
Status: COMPLETED | OUTPATIENT
Start: 2024-04-21 | End: 2024-04-21

## 2024-04-21 RX ORDER — IOPAMIDOL 755 MG/ML
75 INJECTION, SOLUTION INTRAVASCULAR ONCE
Status: COMPLETED | OUTPATIENT
Start: 2024-04-21 | End: 2024-04-21

## 2024-04-21 RX ORDER — ACETAMINOPHEN 500 MG
1000 TABLET ORAL 2 TIMES DAILY
Status: DISCONTINUED | OUTPATIENT
Start: 2024-04-21 | End: 2024-04-23 | Stop reason: HOSPADM

## 2024-04-21 RX ORDER — DEXTROSE MONOHYDRATE 25 G/50ML
25-50 INJECTION, SOLUTION INTRAVENOUS
Status: DISCONTINUED | OUTPATIENT
Start: 2024-04-21 | End: 2024-04-23 | Stop reason: HOSPADM

## 2024-04-21 RX ORDER — CARBOXYMETHYLCELLULOSE SODIUM 5 MG/ML
1 SOLUTION/ DROPS OPHTHALMIC 4 TIMES DAILY PRN
Status: DISCONTINUED | OUTPATIENT
Start: 2024-04-21 | End: 2024-04-23 | Stop reason: HOSPADM

## 2024-04-21 RX ORDER — PANTOPRAZOLE SODIUM 40 MG/1
40 TABLET, DELAYED RELEASE ORAL
Status: DISCONTINUED | OUTPATIENT
Start: 2024-04-22 | End: 2024-04-23 | Stop reason: HOSPADM

## 2024-04-21 RX ORDER — NALOXONE HYDROCHLORIDE 0.4 MG/ML
.1-.4 INJECTION, SOLUTION INTRAMUSCULAR; INTRAVENOUS; SUBCUTANEOUS
Status: DISCONTINUED | OUTPATIENT
Start: 2024-04-21 | End: 2024-04-23 | Stop reason: HOSPADM

## 2024-04-21 RX ORDER — NICOTINE POLACRILEX 4 MG
15-30 LOZENGE BUCCAL
Status: DISCONTINUED | OUTPATIENT
Start: 2024-04-21 | End: 2024-04-23 | Stop reason: HOSPADM

## 2024-04-21 RX ADMIN — POLYMYXIN B SULFATE AND TRIMETHOPRIM 2 DROP: 10000; 1 SOLUTION OPHTHALMIC at 21:38

## 2024-04-21 RX ADMIN — IOPAMIDOL 75 ML: 755 INJECTION, SOLUTION INTRAVENOUS at 19:04

## 2024-04-21 RX ADMIN — Medication 1 DROP: at 21:40

## 2024-04-21 RX ADMIN — GABAPENTIN 200 MG: 100 CAPSULE ORAL at 21:39

## 2024-04-21 RX ADMIN — SIMVASTATIN 40 MG: 40 TABLET, FILM COATED ORAL at 21:39

## 2024-04-21 RX ADMIN — ASPIRIN 81 MG CHEWABLE TABLET 81 MG: 81 TABLET CHEWABLE at 20:26

## 2024-04-21 RX ADMIN — GADOBUTROL 9 ML: 604.72 INJECTION INTRAVENOUS at 18:40

## 2024-04-21 RX ADMIN — ACETAMINOPHEN 1000 MG: 500 TABLET ORAL at 21:39

## 2024-04-21 RX ADMIN — RIVAROXABAN 20 MG: 10 TABLET, FILM COATED ORAL at 21:46

## 2024-04-21 ASSESSMENT — COLUMBIA-SUICIDE SEVERITY RATING SCALE - C-SSRS
1. IN THE PAST MONTH, HAVE YOU WISHED YOU WERE DEAD OR WISHED YOU COULD GO TO SLEEP AND NOT WAKE UP?: NO
6. HAVE YOU EVER DONE ANYTHING, STARTED TO DO ANYTHING, OR PREPARED TO DO ANYTHING TO END YOUR LIFE?: NO
2. HAVE YOU ACTUALLY HAD ANY THOUGHTS OF KILLING YOURSELF IN THE PAST MONTH?: NO

## 2024-04-21 ASSESSMENT — ACTIVITIES OF DAILY LIVING (ADL)
ADLS_ACUITY_SCORE: 27
ADLS_ACUITY_SCORE: 36
DEPENDENT_IADLS:: CLEANING;COOKING;LAUNDRY;SHOPPING;MEAL PREPARATION;MEDICATION MANAGEMENT;TRANSPORTATION
ADLS_ACUITY_SCORE: 27
ADLS_ACUITY_SCORE: 25
ADLS_ACUITY_SCORE: 36

## 2024-04-21 NOTE — ED PROVIDER NOTES
Emergency Department Staff Physician Note     I had a face to face encounter with this patient seen by the Advanced Practice Provider (JAYDON).  I have seen, examined, and discussed the patient with the JAYDON and agree with their assessment and plan of management.    Relevant HPI:     Ever Crane is a 79 year old male who presents to the Emergency Department for evaluation of generalized weakness.    Right upper and lower extremities weakness four days ago (on 4/17). He states more weakness on the right side than left. He denies a fall. Uses a walker to ambulate. He is able to ambulate but is unsteady. The patient is on plavix.     There is chronic loose stool and left sided abdominal pain. No fever. No chest pain and no shortness of breath.      I, Moises Ashley, am serving as a scribe to document services personally performed by Stewart Ruiz D.O., based on my observations and the provider's statements to me.   I, Stewart Ruiz D.O., attest that Moises Ashley was acting in a scribe capacity, has observed my performance of the services and has documented them in accordance with my direction.    ED Course:  4:53 PM I received the patient report from the JAYDON, Dana Walton PA-C. I agree with their assessment and plan of management, and I will see the patient.  4:58 PM I met with the patient to introduce myself, gather additional history, perform my initial exam, and discuss the plan.     Brief Physical Exam:  VITAL SIGNS: /62   Pulse 69   Temp 98.1  F (36.7  C) (Oral)   Resp 16   SpO2 96%     General Appearance: Well-appearing, well-nourished, no acute distress   Head:  Normocephalic, without obvious abnormality, atraumatic  Eyes:  PERRL, conjunctiva/corneas clear, EOM's intact. Bilateral blepharitis  ENT:  Lips, mucosa, and tongue normal, membranes are moist without pallor  Neck:  Normal ROM, symmetrical, trachea midline    Cardio:  Regular rate and rhythm, no murmur, rub or gallop, 2+ pulses symmetric in all  extremities  Pulm:  Clear to auscultation bilaterally, respirations unlabored,   Abdomen:  Soft, non-tender, no rebound or guarding. Mild abdominal distention  Musculoskeletal: Full ROM, no edema, no cyanosis, good ROM of major joints  Integument:  Warm, Dry, No erythema, No rash.    Neurologic: Patient is alert and oriented ×3.  Face is symmetric.  Speech is normal.  Visual fields are full.  Cranial nerves II through XII are grossly intact. Motor tone is 5/5 and equal in both upper and lower extremity on left 3/5 upper and lower on the right.  Bilateral symmetric sensation grossly intact upper and lower.         LABS  Results for orders placed or performed during the hospital encounter of 04/21/24 (from the past 24 hour(s))   CBC with platelets differential    Narrative    The following orders were created for panel order CBC with platelets differential.  Procedure                               Abnormality         Status                     ---------                               -----------         ------                     CBC with platelets and d...[[id number]]  Abnormal            Final result                 Please view results for these tests on the individual orders.   Basic metabolic panel   Result Value Ref Range    Sodium 137 135 - 145 mmol/L    Potassium 4.6 3.4 - 5.3 mmol/L    Chloride 103 98 - 107 mmol/L    Carbon Dioxide (CO2) 21 (L) 22 - 29 mmol/L    Anion Gap 13 7 - 15 mmol/L    Urea Nitrogen 37.3 (H) 8.0 - 23.0 mg/dL    Creatinine 1.43 (H) 0.67 - 1.17 mg/dL    GFR Estimate 50 (L) >60 mL/min/1.73m2    Calcium 9.7 8.8 - 10.2 mg/dL    Glucose 162 (H) 70 - 99 mg/dL   Hepatic function panel   Result Value Ref Range    Protein Total 7.9 6.4 - 8.3 g/dL    Albumin 4.0 3.5 - 5.2 g/dL    Bilirubin Total 0.7 <=1.2 mg/dL    Alkaline Phosphatase 97 40 - 150 U/L    AST 20 0 - 45 U/L    ALT 10 0 - 70 U/L    Bilirubin Direct <0.20 0.00 - 0.30 mg/dL   Lipase   Result Value Ref Range    Lipase 23 13 - 60 U/L    Magnesium   Result Value Ref Range    Magnesium 2.2 1.7 - 2.3 mg/dL   Troponin T, High Sensitivity   Result Value Ref Range    Troponin T, High Sensitivity 24 (H) <=22 ng/L   Nt probnp inpatient (BNP)   Result Value Ref Range    N terminal Pro BNP Inpatient 169 0 - 1,800 pg/mL   CK total   Result Value Ref Range     39 - 308 U/L   CBC with platelets and differential   Result Value Ref Range    WBC Count 8.2 4.0 - 11.0 10e3/uL    RBC Count 4.55 4.40 - 5.90 10e6/uL    Hemoglobin 12.1 (L) 13.3 - 17.7 g/dL    Hematocrit 38.4 (L) 40.0 - 53.0 %    MCV 84 78 - 100 fL    MCH 26.6 26.5 - 33.0 pg    MCHC 31.5 31.5 - 36.5 g/dL    RDW 14.6 10.0 - 15.0 %    Platelet Count 188 150 - 450 10e3/uL    % Neutrophils 63 %    % Lymphocytes 22 %    % Monocytes 8 %    % Eosinophils 6 %    % Basophils 1 %    % Immature Granulocytes 0 %    NRBCs per 100 WBC 0 <1 /100    Absolute Neutrophils 5.2 1.6 - 8.3 10e3/uL    Absolute Lymphocytes 1.8 0.8 - 5.3 10e3/uL    Absolute Monocytes 0.7 0.0 - 1.3 10e3/uL    Absolute Eosinophils 0.5 0.0 - 0.7 10e3/uL    Absolute Basophils 0.1 0.0 - 0.2 10e3/uL    Absolute Immature Granulocytes 0.0 <=0.4 10e3/uL    Absolute NRBCs 0.0 10e3/uL   UA with Microscopic reflex to Culture    Specimen: Urine, Clean Catch   Result Value Ref Range    Color Urine Light Yellow Colorless, Straw, Light Yellow, Yellow    Appearance Urine Clear Clear    Glucose Urine Negative Negative mg/dL    Bilirubin Urine Negative Negative    Ketones Urine Negative Negative mg/dL    Specific Gravity Urine 1.018 1.001 - 1.030    Blood Urine Negative Negative    pH Urine 5.5 5.0 - 7.0    Protein Albumin Urine Negative Negative mg/dL    Urobilinogen Urine <2.0 <2.0 mg/dL    Nitrite Urine Negative Negative    Leukocyte Esterase Urine Negative Negative    RBC Urine 1 <=2 /HPF    WBC Urine <1 <=5 /HPF    Narrative    Urine Culture not indicated   MR Brain w/o & w Contrast    Narrative    EXAM: MR BRAIN W/O and W CONTRAST  [facility]  DATE: 4/21/2024    INDICATION: right upper and lower extremity weakness onset 4 days ago  COMPARISON: CT head 10/05/2018  CONTRAST: 9ml gadavist  TECHNIQUE: Routine multiplanar multisequence head MRI without and with intravenous contrast.    FINDINGS:  INTRACRANIAL CONTENTS: Small acute/early subacute infarction along the posterior lateral aspect of left basal ganglia. No definite associated hemorrhage. No significant mass effect. No mass, acute hemorrhage, or extra-axial fluid collections. Scattered   nonspecific T2/FLAIR hyperintensities within the cerebral white matter most consistent with mild chronic microvascular ischemic change. Moderate generalized cerebral atrophy. No hydrocephalus. Diffuse atrophy of corpus callosum. Normal position of the   cerebellar tonsils. No pathologic contrast enhancement.    SELLA: No abnormality accounting for technique.    OSSEOUS STRUCTURES/SOFT TISSUES: Normal marrow signal. The major intracranial vascular flow voids are maintained.     ORBITS: No abnormality accounting for technique.     SINUSES/MASTOIDS: Moderate to marked mucosal thickening paranasal sinuses. Opacification right mastoid air cells.       Impression    IMPRESSION:  1.  Small acute/early subacute infarction along the posterior lateral aspect of left basal ganglia.  2.  No definite associated hemorrhage.  3.  No significant mass effect.  4.  Mild chronic small vessel ischemia.  5.  Moderate atrophy.   CT Chest/Abdomen/Pelvis w Contrast   Result Value Ref Range    Radiologist flags Pancreatic cyst, lung nodule     Narrative    EXAM: CT CHEST/ABDOMEN/PELVIS W CONTRAST  LOCATION: [facility]  DATE: 4/21/2024    INDICATION: feels weak, intermittent left sided abdominal pain, heart failure hx  COMPARISON: None.  TECHNIQUE: CT scan of the chest, abdomen, and pelvis was performed following injection of IV contrast. Multiplanar reformats were obtained. Dose reduction  techniques were used.   CONTRAST: 75ml isovue 370    FINDINGS:   LUNGS AND PLEURA: Scattered airway secretions and bronchiectasis. Mild emphysema. Subpleural atelectasis in/or scarring are seen within both lung bases. 5 mm right upper lobe nodule (series 4 image 132). No pleural effusion or pneumothorax.    MEDIASTINUM/AXILLAE: No pathologically enlarged thoracic lymph nodes. Unremarkable thyroid. Normal caliber thoracic aorta. Normal heart size. No pericardial effusion.    CORONARY ARTERY CALCIFICATION: Severe.    HEPATOBILIARY: Subcentimeter hypoattenuating lesion/lesions are too small to characterize. No biliary dilation. Cholecystectomy.     PANCREAS: 2.1 cm hypoattenuating lesion within the pancreatic head/uncinate (series 3 image 172).      SPLEEN: Normal.     ADRENAL GLANDS: Normal.     KIDNEYS/BLADDER: Benign renal cysts and/or probable cysts warrant no further follow up. Nonobstructing left nephrolithiasis. Normal urinary bladder.      BOWEL: No obstruction. Normal appendix. No bowel wall thickening or pneumatosis. No pneumoperitoneum or free fluid.     LYMPH NODES: No pathologically enlarged lymph nodes.    VASCULATURE: Nonaneurysmal aorta with severe aortoiliac calcifications.     PELVIC ORGANS: Prostatomegaly.     MUSCULOSKELETAL: Multilevel degenerative disc disease of the thoracolumbar spine. Left proximal femur hardware.       Impression    IMPRESSION:  1.  No acute abnormalities in the chest, abdomen, or pelvis.  2.  2.1 cm cystic lesion in the pancreatic head/uncinate. Recommend nonemergent/outpatient MRI/MRCP for further evaluation.  3.  5 mm right upper lobe nodule. Correlate with risk factors and consider follow-up chest CT in 12 months.  4.  Chronic and ancillary findings as described.    [Consider Follow Up: Pancreatic cyst, lung nodule]    This report will be copied to the [facility] to ensure a provider acknowledges the finding.   Troponin T, High Sensitivity (now)   Result Value  Ref Range    Troponin T, High Sensitivity 24 (H) <=22 ng/L   INR   Result Value Ref Range    INR 1.61 (H) 0.85 - 1.15   Partial thromboplastin time   Result Value Ref Range    aPTT 38 22 - 38 Seconds   Glucose by meter   Result Value Ref Range    GLUCOSE BY METER POCT 109 (H) 70 - 99 mg/dL         RADIOLOGY  CT Chest/Abdomen/Pelvis w Contrast   Final Result   IMPRESSION:   1.  No acute abnormalities in the chest, abdomen, or pelvis.   2.  2.1 cm cystic lesion in the pancreatic head/uncinate. Recommend nonemergent/outpatient MRI/MRCP for further evaluation.   3.  5 mm right upper lobe nodule. Correlate with risk factors and consider follow-up chest CT in 12 months.   4.  Chronic and ancillary findings as described.      [Consider Follow Up: Pancreatic cyst, lung nodule]      This report will be copied to the [facility] to ensure a provider acknowledges the finding.      MR Brain w/o & w Contrast   Final Result   IMPRESSION:   1.  Small acute/early subacute infarction along the posterior lateral aspect of left basal ganglia.   2.  No definite associated hemorrhage.   3.  No significant mass effect.   4.  Mild chronic small vessel ischemia.   5.  Moderate atrophy.             EKG:    Rate: 79 bpm  Rhythm: Atrial flutter  Axis: Normal  Interval: Normal  Conduction: Normal  QRS: Normal  ST: Normal  T-wave: Normal  QT: Not prolonged  Comparison EKG: No significant change compared to 18 August 2019  Impression:  No acute ischemic change   I have independently reviewed and interpreted today's EKG, pending Cardiologist read       Impression / ED Plan:  I had a face to face encounter with this patient seen by the Advanced Practice Provider (JAYDON). I personally made/approved the management plan and take responsibility for the patient management. I personally saw patient and performed a substantive portion of the visit including all aspects of the medical decision making.     Ever Crane is a 79 year old male  presents to the ED for evaluation of difficulty with ambulation and feeling unsteady, feels like his right side is weak.  Patient reports symptoms have been going on for 4 days therefore stroke code was not called.  Initial differential does include CVA, musculoskeletal etiology, infectious etiology, other acute process.  No history of trauma to suggest spinal pathology.  Based on the patient's symptoms we did decide to perform a MRI of the head.  Unfortunately MRI does confirm CVA, which does explain the patient's quite well.  As the symptoms have going on for the past 4 days he would not be a candidate for TNK.  At this time, plan is for admission for further management with stroke neurology and hospitalist team.    1. Infarction of left basal ganglia (H)        Stewart Ruiz D.O.  Emergency Medicine  [facility] EMERGENCY DEPARTMENT  [facility] [facility] [address]  [phone]  Dept: [phone]       Stewart Ruiz,   04/21/24 2049     07-May-2016

## 2024-04-21 NOTE — ED NOTES
Bed: JNED-27  Expected date: 4/21/24  Expected time: 4:03 PM  Means of arrival: Ambulance  Comments:  79 M generalized weakness  Jennie

## 2024-04-21 NOTE — ED TRIAGE NOTES
Pt brought in by ems from nursing home, pt repots  R sided weakness that started on wednesday and progresed to generalized weakness. Per ems pt Usually use walker but is being on w/c last 3 days. Denies any cp and sob. Able to move all extremities with strong . Pt has bilat  leg toes ambutation with dressing intact   Triage Assessment (Adult)       Row Name 04/21/24 0179          Triage Assessment    Airway WDL WDL        Respiratory WDL    Respiratory WDL WDL        Skin Circulation/Temperature WDL    Skin Circulation/Temperature WDL WDL        Cardiac WDL    Cardiac WDL WDL        Cognitive/Neuro/Behavioral WDL    Cognitive/Neuro/Behavioral WDL WDL

## 2024-04-21 NOTE — ED PROVIDER NOTES
EMERGENCY DEPARTMENT ENCOUNTER      NAME: Ever Crane  AGE: 79 year old male  YOB: 1945  MRN: [id number]  EVALUATION DATE & TIME: 04/21/24 4:28 PM    PCP: Adriano Dodd Physician    ED PROVIDER: Dana Walton PA-C      CHIEF COMPLAINT:  Generalized Weakness      FINAL IMPRESSION:  1. Infarction of left basal ganglia (H)          ED COURSE & MEDICAL DECISION MAKING:  Pertinent Labs & Imaging studies reviewed. (See chart for details)    The patient is a 79-year-old male with a complex medical history including atrial fibrillation, MESHA, MRSA, hypertension, type 2 diabetes mellitus with neuropathy, heart failure, NSTEMI, PAD, hyperlipidemia, pressure ulcers of bilateral heels, on Plavix currently residing at nursing facility for increased care including ongoing wound care to bilateral lower extremities presenting to the emergency department for evaluation of right upper and lower extremity weakness gradual onset 4 days ago.  No preceding falls or injuries or head impact.  No fevers, chills, chest pain, shortness of breath, vomiting, blood in stool, urinary symptoms.  He also endorses intermittent left-sided abdominal pain, denies having any abdominal pain presently.  Also endorses ongoing loose stool for the past year.    Initial vital signs reviewed and significant for elevated blood pressure, repeat vital signs with significantly improved blood pressure.  Patient is afebrile.  On exam, the patient is chronically ill-appearing but nontoxic-appearing resting comfortably in hospital bed in no acute distress.  He appears jaundiced, no scleral icterus. He has blepharitis with crusted matting at eyelid margin and ectropion bilaterlly, left worse than right.      Neurologic examination revealed right upper and lower extremity weakness, no focal deficits on neurologic examination otherwise.  Heart with regular rate and irregularly irregular rhythm, radial pulses present and symmetric.  Lungs  clear to auscultation throughout.  Abdomen is soft, distended, nontender with no rebound or guarding, bowel sounds are present.  Ventral hernia present with Valsalva maneuver, is soft and easily reducible.  Bilateral lower extremities with chronic venous stasis changes and ulcerations with overlying scab present, his feet are wrapped with a gauze dressing bilaterally, no drainage through the dressing.    Differential diagnosis includes cerebral ischemia, tumor, subarachnoid hemorrhage, subdural hemorrhage, coronary artery disease, ACS, pneumonia and heart failure. Other differential diagnoses include electrolyte imbalance(s), anemia, urinary tract infection, medication reaction, deconditioning.  As patient appears jaundiced on exam, also considered hepatobiliary etiology.  He reports laying in bed for most of the day, did consider rhabdomyolysis.  The patient is afebrile, I have lower clinical suspicion for sepsis or serious bacterial illness at this time.  The wounds on his legs appear chronic and with diligent wound care from the nurse at his facility, I have lower clinical suspicion for infection.  Stroke code not called given symptom onset 4 days ago. Will plan to pursue broad workup.    CBC with no leukocytosis, hemoglobin appears stable compared to prior at 12.1.  BMP with elevated creatinine 1.43 slightly increased compared to prior.  GFR 50.  Hepatic function panel, lipase, magnesium within normal limits.  CK within normal limits.  BNP within normal limits.  Initial troponin 24, repeat troponin ordered.  MRI brain revealed small acute/early subacute infarction along posterior lateral aspect of left basal ganglia, no definite associated hemorrhage, no significant mass effect.  I discussed the patient with stroke neurology who recommends adding baby aspirin to patient's regimen.  I updated the patient on these results and discussed plan for admission, he is amenable to this plan.  I discussed the patient with  the hospitalist who accepts the patient for observation admission.  CT abdomen pelvis results pending at time of admission.    At the conclusion of the encounter I discussed the results of all of the tests and the disposition. The questions were answered. The patient or family acknowledged understanding and was agreeable with the care plan.       ED COURSE:  4:31 PM I met and introduced myself to the patient. I gathered initial history and performed an initial physical exam. We discussed options and plan for diagnostics and treatment here in the ED.  5:00 PM I have staffed the patient with Dr. Stewart Ruiz, ED physician, who has evaluated the patient and agrees with all aspects of today's care.   7:18 PM Rechecked and updated patient on imaging results, discussed plans for admission. Patient is amenable.  7:16 PM Paged Stroke Neurology.  7:24 PM Discussed patient with Dr. Burrows, Stroke Neurology who recommends addition of baby aspirin to patient's Plavix.  Hourly neurochecks not indicated at this time given symptom onset 4 days ago.    7:39 PM Paged hospitalist.  7:40 PM I discussed the patient with the hospitalist, Dr. Devi, who accepts the patient for admission.       Medical Decision Making  Obtained supplemental history:Supplemental history obtained?: No  Reviewed external records: External records reviewed?: Inpatient Record: 04/04/2024 Nursing Home Visit  Care impacted by chronic illness:Anticoagulated State, Chronic Kidney Disease, Chronic Lung Disease, Dementia, Diabetes, Heart Disease, Hyperlipidemia, Hypertension, and Peripheral Vascular Disease  Care significantly affected by social determinants of health:N/A  Did you consider but not order tests?: Work up considered but not performed and documented in chart, if applicable  Did you interpret images independently?: Independent interpretation of ECG and images noted in documentation, when applicable.  Consultation discussion with other provider:Did you  involve another provider (consultant, , pharmacy, etc.)?: I discussed the care with another health care provider, see documentation for details.  Admit.      CRITICAL CARE:  Not applicable      MEDICATIONS GIVEN IN THE EMERGENCY:  Medications   lidocaine 1 % 0.1-1 mL (has no administration in time range)   lidocaine (LMX4) cream (has no administration in time range)   sodium chloride (PF) 0.9% PF flush 3 mL (3 mLs Intracatheter $Given 4/21/24 2140)   sodium chloride (PF) 0.9% PF flush 3 mL (has no administration in time range)   Medication Instructions - Avoid dextrose in IV solutions. (has no administration in time range)   medication instruction - No oral meds if patient didn't pass dysphagia screen (has no administration in time range)   aspirin EC tablet 81 mg (has no administration in time range)     Or   aspirin (ASA) chewable tablet 81 mg (has no administration in time range)   glucose gel 15-30 g (has no administration in time range)     Or   dextrose 50 % injection 25-50 mL (has no administration in time range)     Or   glucagon injection 1 mg (has no administration in time range)   insulin aspart (NovoLOG) injection (RAPID ACTING) (has no administration in time range)   insulin aspart (NovoLOG) injection (RAPID ACTING) (has no administration in time range)   insulin aspart (NovoLOG) injection (RAPID ACTING) ( Subcutaneous Not Given 4/21/24 2140)   acetaminophen (TYLENOL) tablet 650 mg (has no administration in time range)   acetaminophen (TYLENOL) tablet 1,000 mg (1,000 mg Oral $Given 4/21/24 2139)   albuterol (PROVENTIL HFA/VENTOLIN HFA) inhaler (has no administration in time range)   cetirizine (zyrTEC) tablet 5 mg (has no administration in time range)   clopidogrel (PLAVIX) tablet 75 mg (has no administration in time range)   furosemide (LASIX) tablet 40 mg (has no administration in time range)   gabapentin (NEURONTIN) capsule 200 mg (200 mg Oral $Given 4/21/24 2139)   losartan (COZAAR) half-tab 12.5  mg (has no administration in time range)   naloxone (NARCAN) injection 0.1-0.4 mg (has no administration in time range)   pantoprazole (PROTONIX) EC tablet 40 mg (has no administration in time range)   rivaroxaban ANTICOAGULANT (XARELTO) tablet 20 mg (20 mg Oral $Given 4/21/24 2146)   simvastatin (ZOCOR) tablet 40 mg (40 mg Oral $Given 4/21/24 2139)   spironolactone (ALDACTONE) tablet 25 mg (has no administration in time range)   tamsulosin (FLOMAX) capsule 0.4 mg (has no administration in time range)   polymixin b-trimethoprim (POLYTRIM) ophthalmic solution 2 drop (2 drops Left Eye $Given 4/21/24 2138)   carboxymethylcellulose PF (REFRESH PLUS) 0.5 % ophthalmic solution 1 drop (1 drop Left Eye $Given 4/21/24 2140)   gadobutrol (GADAVIST) injection 9 mL (9 mLs Intravenous $Given 4/21/24 1840)   iopamidol (ISOVUE-370) solution 75 mL (75 mLs Intravenous $Given 4/21/24 1904)   aspirin (ASA) chewable tablet 81 mg (81 mg Oral $Given 4/21/24 2026)       NEW PRESCRIPTIONS STARTED AT TODAY'S ER VISIT  Current Discharge Medication List             =================================================================    HPI    Patient information was obtained from: Patient    Use of Intrepreter: N/A      Ever Crane is a 79 year old male with pertinent medical history of hypertension, stage 3b chronic kidney disease, peripheral vascular disease, persistent atrial fibrillation, type 2 diabetes mellitus with polyneuropathy, COPD, coronary artery disease, hyperlipidemia, and s/p bypass graft coronary artery who presents to the emergency department for evaluation of generalized weakness.    The patient reports generalized weakness since Wednesday (~4 days ago), worse on his right side. He states that it is difficult for him to ambulate and use his right hand. This morning, he states that he fell back onto his toilet and landed on his buttocks. He denies hitting his head or losing consciousness.     He reports intermittent abdominal  pain for 1-2 years, but denies current abdominal pain. He endorses abdominal distension. He endorses looser stools for about 1 year that are sometimes watery; his stools are brown in color.     The patient reports wounds on his bilateral legs and endorses erythema to his left leg. He reports neuropathy with decreased sensation up to his mid shins bilaterally. The patient has a wound care nurse. He uses a walker and stays at a nursing home.    He additionally reports ongoing left eye discharge and irritation. He states that he has been using Artificial Tears.    The patient denies fever, chills, vision changes, chest pain, or shortness of breath. No dysuria, hematuria, or other urinary symptoms. No recent medication changes.    Per chart review, the patient was seen by [facility] for a Nursing Home Visit on 04/04/2024. He reported 2 left leg wounds that were draining, managed by wound care nurse. Patient is s/p transmetatarsal amputation of right food, right achilles tendon lengthening, excisional debridement ulceration right heel. The patient reported left eye irritation chronically, was treated with Polytrim 0.1% for 7 days on 03/11/2024.     PAST MEDICAL HISTORY:  Past Medical History:   Diagnosis Date    A-fib (H)     Acute diastolic heart failure (H) 06/07/2022    Acute posthemorrhagic anemia 05/17/2022    MESHA (acute kidney injury) (H24)     Anemia     Atopic keratoconjunctivitis     Atrial fibrillation (H)     Brian Moe: 8/2011 Cardioversion; CHADS2 VASC = 5; he is on warfarin and sotalol     Atrial flutter (H)     Mcgarry's esophagus 01/01/2012    per note of Dr. Tavo Mike of [facility]    Bilateral lower leg cellulitis 08/31/2018    BPH (benign prostatic hyperplasia)     Candidiasis of perineum 01/03/2018    Carotid stenosis     Carotid stenosis, asymptomatic, right     Cellulitis     CHF (congestive heart failure) (H)     Cholecystitis 10/14/2019    Cholecystitis, acute 08/18/2019     Chronic systolic heart failure (H)     Chronic venous stasis dermatitis     Closed nondisplaced intertrochanteric fracture of left femur with routine healing, subsequent encounter 05/18/2022    Clostridium difficile colitis     Contact with and (suspected) exposure to covid-19 12/13/2021    COPD (chronic obstructive pulmonary disease) (H)     Coronary artery disease due to lipid rich plaque 2000    CABG x2    Coronary atherosclerosis     Diabetes (H)     Diabetic ulcer of both feet (H) 10/31/2017    Diabetic ulcer of toe of right foot associated with diabetes mellitus due to underlying condition, limited to breakdown of skin (H) 01/05/2023    Diabetic ulcer of toe of right foot associated with diabetes mellitus due to underlying condition, with necrosis of bone (H) 01/05/2023    Dyslexia     Dyslipidemia, goal LDL below 70 2000    Epistaxis     Essential hypertension     Gangrene of left foot (H)     GERD (gastroesophageal reflux disease)     HLD (hyperlipidemia)     HTN (hypertension)     Hyponatremia     Ischemic heart disease     Lymphedema     Mild cognitive impairment     MRSA (methicillin resistant Staphylococcus aureus)     Neuropathy     Non-STEMI (non-ST elevated myocardial infarction) (H)     Occlusion and stenosis of right carotid artery     Osteoarthrosis     Osteomyelitis of ankle and foot (H)     Other atopic dermatitis     PAD (peripheral artery disease) (H24)     Peripheral vascular disease (H24)     Pneumonia 06/26/2018    Polyneuropathy due to type 2 diabetes mellitus (H)     Pressure ulcer, heel     Bilateral    Renal insufficiency     Type 2 diabetes mellitus, without long-term current use of insulin (H)     Ulcer of right foot (H)     Unable to function independently 11/13/2017       PAST SURGICAL HISTORY:  Past Surgical History:   Procedure Laterality Date    AMPUTATE FOOT Left 11/05/2017    Procedure: LEFT TRANSMETATARSAL AMPUTATION;  Surgeon: Ever Wick MD;  Location: [facility] OR;   Service:     AMPUTATE FOOT Right 4/27/2023    Procedure: Transmetatarsal amputation right foot;  Surgeon: Miguelangel Spears DPM;  Location: [facility] OR    AMPUTATE FOOT Right 5/15/2023    Procedure: partial calcanectomy;  Surgeon: Miguelangel Spears DPM;  Location: [facility] OR    BYPASS GRAFT ARTERY CORONARY N/A 03/06/2000    SVG to OM1, SVG to PDA    BYPASS GRAFT ARTERY CORONARY N/A 10/04/2018    redo CABG due to graft failure    CABG MEASURES GRP      CARDIOVERSION  08/25/2011    CV CORONARY ANGIOGRAM N/A 10/01/2018    Procedure: Coronary Angiogram;  Surgeon: Miki Mac MD;  Location: [facility];  Service:     INCISION AND DRAINAGE FOOT, COMBINED Right 5/15/2023    Procedure: INCISION AND DRAINAGE, right heel with,;  Surgeon: Miguelanegl Spears DPM;  Location: [facility]    INCISION AND DRAINAGE FOOT, COMBINED Bilateral 6/1/2023    Procedure: INCISION AND DRAINAGE, right foot with debridement of ulceration bilateral heels;  Surgeon: Miguelangel Spears DPM;  Location: [facility]    INGUINAL HERNIA REPAIR Bilateral 1969    and 1979    IR EXTREMITY ANGIOGRAM BILATERAL  11/3/2017    IR LOWER EXTREMITY ANGIOGRAM LEFT  3/10/2023    IR LOWER EXTREMITY ANGIOGRAM RIGHT  1/24/2023    LAPAROSCOPIC CHOLECYSTECTOMY N/A 08/18/2019    Procedure: CHOLECYSTECTOMY, LAPAROSCOPIC;  Surgeon: Stewart Fountain MD;  Location: [facility];  Service: General    LENGTHEN TENDON ACHILLES Right 4/27/2023    Procedure: with Achilles tendon lengthening, debridement of right heel ulceration.;  Surgeon: Miguelangel Spears DPM;  Location: [facility]       CURRENT MEDICATIONS:    Prior to Admission Medications   Prescriptions Last Dose Informant Patient Reported? Taking?   acetaminophen (TYLENOL) 325 MG tablet Past Month at prn  Yes Yes   Sig: Take 650 mg by mouth 2 times daily as needed for mild pain   acetaminophen (TYLENOL) 500 MG tablet 4/21/2024 at am  Yes Yes   Sig:  Take 1,000 mg by mouth 2 times daily   albuterol (PROAIR HFA/PROVENTIL HFA/VENTOLIN HFA) 108 (90 Base) MCG/ACT inhaler Unknown at prn  Yes Yes   Sig: Inhale 2 puffs into the lungs 2 times daily as needed   cetirizine (ZYRTEC) 5 MG tablet 4/20/2024 at pm  Yes Yes   Sig: Take 5 mg by mouth daily   clopidogrel (PLAVIX) 75 MG tablet 4/21/2024 at am  Yes Yes   Sig: Take 75 mg by mouth daily   dulaglutide (TRULICITY) 0.75 MG/0.5ML pen Past Week at Tuesdays  Yes Yes   Sig: Inject 1.5 mg Subcutaneous every 7 days   furosemide (LASIX) 40 MG tablet 4/21/2024 at am  Yes Yes   Sig: Take 40 mg by mouth daily   gabapentin (NEURONTIN) 100 MG capsule 4/21/2024 at am  Yes Yes   Sig: Take 200 mg by mouth 2 times daily   glimepiride (AMARYL) 1 MG tablet 4/21/2024 at am  Yes Yes   Sig: Take 1 mg by mouth every morning (before breakfast)   hypromellose (ARTIFICIAL TEARS) 0.5 % SOLN ophthalmic solution Past Month at prn  Yes Yes   Sig: Place 1 drop Into the left eye 4 times daily as needed for dry eyes   ketoconazole (NIZORAL) 2 % external shampoo Past Week  Yes Yes   Sig: Apply topically twice a week Mon and Fri.   losartan (COZAAR) 25 MG tablet 4/21/2024 at am  Yes Yes   Sig: Take 12.5 mg by mouth daily   naloxone (NARCAN) 0.4 MG/ML injection Unknown at prn  Yes Yes   Sig: Inject 0.1-0.4 mg into the muscle once as needed for opioid reversal   omeprazole (PRILOSEC) 20 MG CR capsule 4/21/2024 at am  Yes Yes   Sig: Take 20 mg by mouth daily   rivaroxaban ANTICOAGULANT (XARELTO) 15 MG TABS tablet 4/20/2024 at pm  Yes Yes   Sig: Take 20 mg by mouth daily   simvastatin (ZOCOR) 40 MG tablet 4/20/2024 at hs  Yes Yes   Sig: Take 40 mg by mouth At Bedtime   spironolactone (ALDACTONE) 25 MG tablet 4/21/2024 at am  Yes Yes   Sig: Take 25 mg by mouth daily   tamsulosin (FLOMAX) 0.4 MG capsule 4/21/2024 at am  Yes Yes   Sig: Take 0.4 mg by mouth daily      Facility-Administered Medications: None       ALLERGIES:  Allergies   Allergen Reactions     Lanolin      Other Reaction(s): Not available    Cranberry Extract Itching and Rash    Doxycycline Rash    Latex Rash    Penicillin V Rash     Reaction occurred as a child. Patient tolerated Zosyn 2018, Cefazolin 10/2018, and has also tolerated Augmentin.    Penicillins Rash     Reaction occurred as a child. Patient tolerated Zosyn 2018, Cefazolin 10/2018, and has also tolerated Augmentin.       FAMILY HISTORY:  Family History   Problem Relation Age of Onset    Sudden Death Mother 85.00    CABG Father     Valvular heart disease Father     Esophageal Cancer Father 58.00        cause of death    No Known Problems Son     No Known Problems Sister     Obesity Sister     Osteoarthritis Sister     No Known Problems Sister     No Known Problems Grandchild     No Known Problems Grandchild        SOCIAL HISTORY:  Social History     Tobacco Use    Smoking status: Former     Current packs/day: 0.00     Average packs/day: 1 pack/day for 36.0 years (36.0 ttl pk-yrs)     Types: Cigarettes     Start date: 1964     Quit date: 2000     Years since quittin.9    Smokeless tobacco: Never   Substance Use Topics    Alcohol use: Not Currently     Alcohol/week: 5.0 standard drinks of alcohol     Types: 1 Cans of beer, 4 Standard drinks or equivalent per week    Drug use: No        VITALS:    First Vitals:  Patient Vitals for the past 24 hrs:   BP Temp Temp src Pulse Resp SpO2   24 127/70 97.7  F (36.5  C) Oral 74 18 97 %   24 133/72 -- -- 73 19 98 %   24 131/81 -- -- 87 21 96 %   24 194 128/65 -- -- 69 14 96 %   24 1930 124/62 -- -- 69 16 96 %   24 1625 137/70 98.1  F (36.7  C) Oral 90 16 98 %   24 1620 (!) 168/77 -- -- 84 -- 97 %       Patient Vitals for the past 24 hrs:   BP Temp Temp src Pulse Resp SpO2   24 127/70 97.7  F (36.5  C) Oral 74 18 97 %   24 133/72 -- -- 73 19 98 %   24 131/81 -- -- 87 21 96 %   24 194 128/65  -- -- 69 14 96 %   04/21/24 1930 124/62 -- -- 69 16 96 %   04/21/24 1625 137/70 98.1  F (36.7  C) Oral 90 16 98 %   04/21/24 1620 (!) 168/77 -- -- 84 -- 97 %         PHYSICAL EXAM  VITAL SIGNS: /70 (BP Location: Left arm, Patient Position: Supine)   Pulse 74   Temp 97.7  F (36.5  C) (Oral)   Resp 18   SpO2 97%     Constitutional: Awake, alert, No acute distress.    HENT: Normocephalic, posterior pharynx within normal limits, uvula midline, mucous membranes moist.   Eyes: Conjunctiva normal. Visual fields full to confrontation. Blepharitis with crusted matting at eyelid margin and ectropion bilaterlly, left worse than right.   Pulmonary: Breathing easily, clear and equal breath sounds.  No respiratory distress.  Cardiovascular: Regular rate and irregularly irregular rhythm, radial and posterior tibialis pulses present, symmetric, and within normal limits.   GI: Soft, mildly distended, non tender, no rebound or guarding. Bowel sounds normal.  Extremities:  Moving all extremities spontaneously. Extremities are warm and well perfused. Left shin erythematous.  Dressing present to bilateral feet.  Integument: Exposed areas of skin warm, dry, no rashes.  Neurologic: Alert & oriented to person, place and time.  GCS 15. Patient articulates well with no dysarthria.  Decreased right upper and lower extremity strength compared to left upper and lower extremities. Distal sensation grossly intact in upper extremities.  Sensation intact bilateral lower extremities to mid low leg level, diminished sensation distally which is the patient's baseline per his report.  No pronator drift, Finger-nose-finger, Straight leg raise, and heel to shin intact. No tremor.  Cranial nerves II through XII intact.  Psychiatric: Affect normal, Judgment normal, Mood normal. No obvious hallucinations at this time.        RADIOLOGY/LAB:  Reviewed all pertinent imaging. Please see official radiology report.  All pertinent labs reviewed and  interpreted.  Results for orders placed or performed during the hospital encounter of 04/21/24   MR Brain w/o & w Contrast    Impression    IMPRESSION:  1.  Small acute/early subacute infarction along the posterior lateral aspect of left basal ganglia.  2.  No definite associated hemorrhage.  3.  No significant mass effect.  4.  Mild chronic small vessel ischemia.  5.  Moderate atrophy.   CT Chest/Abdomen/Pelvis w Contrast   Result Value Ref Range    Radiologist flags Pancreatic cyst, lung nodule     Impression    IMPRESSION:  1.  No acute abnormalities in the chest, abdomen, or pelvis.  2.  2.1 cm cystic lesion in the pancreatic head/uncinate. Recommend nonemergent/outpatient MRI/MRCP for further evaluation.  3.  5 mm right upper lobe nodule. Correlate with risk factors and consider follow-up chest CT in 12 months.  4.  Chronic and ancillary findings as described.    [Consider Follow Up: Pancreatic cyst, lung nodule]    This report will be copied to the [facility] to ensure a provider acknowledges the finding.   Basic metabolic panel   Result Value Ref Range    Sodium 137 135 - 145 mmol/L    Potassium 4.6 3.4 - 5.3 mmol/L    Chloride 103 98 - 107 mmol/L    Carbon Dioxide (CO2) 21 (L) 22 - 29 mmol/L    Anion Gap 13 7 - 15 mmol/L    Urea Nitrogen 37.3 (H) 8.0 - 23.0 mg/dL    Creatinine 1.43 (H) 0.67 - 1.17 mg/dL    GFR Estimate 50 (L) >60 mL/min/1.73m2    Calcium 9.7 8.8 - 10.2 mg/dL    Glucose 162 (H) 70 - 99 mg/dL   Hepatic function panel   Result Value Ref Range    Protein Total 7.9 6.4 - 8.3 g/dL    Albumin 4.0 3.5 - 5.2 g/dL    Bilirubin Total 0.7 <=1.2 mg/dL    Alkaline Phosphatase 97 40 - 150 U/L    AST 20 0 - 45 U/L    ALT 10 0 - 70 U/L    Bilirubin Direct <0.20 0.00 - 0.30 mg/dL   Result Value Ref Range    Lipase 23 13 - 60 U/L   Result Value Ref Range    Magnesium 2.2 1.7 - 2.3 mg/dL   Result Value Ref Range    Troponin T, High Sensitivity 24 (H) <=22 ng/L   Nt probnp inpatient (BNP)   Result  Value Ref Range    N terminal Pro BNP Inpatient 169 0 - 1,800 pg/mL   UA with Microscopic reflex to Culture    Specimen: Urine, Clean Catch   Result Value Ref Range    Color Urine Light Yellow Colorless, Straw, Light Yellow, Yellow    Appearance Urine Clear Clear    Glucose Urine Negative Negative mg/dL    Bilirubin Urine Negative Negative    Ketones Urine Negative Negative mg/dL    Specific Gravity Urine 1.018 1.001 - 1.030    Blood Urine Negative Negative    pH Urine 5.5 5.0 - 7.0    Protein Albumin Urine Negative Negative mg/dL    Urobilinogen Urine <2.0 <2.0 mg/dL    Nitrite Urine Negative Negative    Leukocyte Esterase Urine Negative Negative    RBC Urine 1 <=2 /HPF    WBC Urine <1 <=5 /HPF   Result Value Ref Range     39 - 308 U/L   CBC with platelets and differential   Result Value Ref Range    WBC Count 8.2 4.0 - 11.0 10e3/uL    RBC Count 4.55 4.40 - 5.90 10e6/uL    Hemoglobin 12.1 (L) 13.3 - 17.7 g/dL    Hematocrit 38.4 (L) 40.0 - 53.0 %    MCV 84 78 - 100 fL    MCH 26.6 26.5 - 33.0 pg    MCHC 31.5 31.5 - 36.5 g/dL    RDW 14.6 10.0 - 15.0 %    Platelet Count 188 150 - 450 10e3/uL    % Neutrophils 63 %    % Lymphocytes 22 %    % Monocytes 8 %    % Eosinophils 6 %    % Basophils 1 %    % Immature Granulocytes 0 %    NRBCs per 100 WBC 0 <1 /100    Absolute Neutrophils 5.2 1.6 - 8.3 10e3/uL    Absolute Lymphocytes 1.8 0.8 - 5.3 10e3/uL    Absolute Monocytes 0.7 0.0 - 1.3 10e3/uL    Absolute Eosinophils 0.5 0.0 - 0.7 10e3/uL    Absolute Basophils 0.1 0.0 - 0.2 10e3/uL    Absolute Immature Granulocytes 0.0 <=0.4 10e3/uL    Absolute NRBCs 0.0 10e3/uL   Troponin T, High Sensitivity (now)   Result Value Ref Range    Troponin T, High Sensitivity 24 (H) <=22 ng/L   Result Value Ref Range    INR 1.61 (H) 0.85 - 1.15   Partial thromboplastin time   Result Value Ref Range    aPTT 38 22 - 38 Seconds   Glucose by meter   Result Value Ref Range    GLUCOSE BY METER POCT 109 (H) 70 - 99 mg/dL   Result Value Ref Range     Hemoglobin A1C 7.0 (H) <5.7 %   Lipid panel reflex to direct LDL: Non-fasting   Result Value Ref Range    Cholesterol 82 <200 mg/dL    Triglycerides 136 <150 mg/dL    Direct Measure HDL 28 (L) >=40 mg/dL    LDL Cholesterol Calculated 27 <=100 mg/dL    Non HDL Cholesterol 54 <130 mg/dL   Glucose by meter   Result Value Ref Range    GLUCOSE BY METER POCT 106 (H) 70 - 99 mg/dL         EKG:  Performed at: 17:19:30 Impression: Atrial flutter with variable AV block. Nonspecific ST and T wave abnormality. Abnormal ECG. Rate: 79 bpm Rhythm: Atrial flutter Axis: 12 LA Interval: * QRS Interval: 94 ms QTc Interval: 463 ms ST Changes: n/a Comparison: 08/18/2019 EKG results independently reviewed and interpreted by Dr. Mays, ED physician.       PROCEDURES:  None        I, Paul Camarena, am serving as a scribe to document services personally performed by Dana Walton PA-C, based on my observation and the provider's statements to me. IDana PA-C attest that Paul Camarena is acting in a scribe capacity, has observed my performance of the services and has documented them in accordance with my direction.         Dana Walton PA-C  Emergency Medicine  [facility] EMERGENCY DEPARTMENT  [facility] [facility] [address]  [phone]  Dept: [phone]     Dana Walton PA-C  04/22/24 0135

## 2024-04-22 ENCOUNTER — APPOINTMENT (OUTPATIENT)
Dept: CARDIOLOGY | Facility: HOSPITAL | Age: 79
DRG: 065 | End: 2024-04-22
Payer: MEDICARE

## 2024-04-22 ENCOUNTER — APPOINTMENT (OUTPATIENT)
Dept: PHYSICAL THERAPY | Facility: HOSPITAL | Age: 79
DRG: 065 | End: 2024-04-22
Payer: MEDICARE

## 2024-04-22 ENCOUNTER — DOCUMENTATION ONLY (OUTPATIENT)
Dept: OTHER | Facility: CLINIC | Age: 79
End: 2024-04-22
Payer: MEDICARE

## 2024-04-22 PROBLEM — R23.9 ALTERATION IN SKIN INTEGRITY: Status: ACTIVE | Noted: 2024-04-22

## 2024-04-22 LAB
ANION GAP SERPL CALCULATED.3IONS-SCNC: 13 MMOL/L (ref 7–15)
BUN SERPL-MCNC: 29.1 MG/DL (ref 8–23)
CALCIUM SERPL-MCNC: 9.4 MG/DL (ref 8.8–10.2)
CHLORIDE SERPL-SCNC: 104 MMOL/L (ref 98–107)
CREAT SERPL-MCNC: 1.28 MG/DL (ref 0.67–1.17)
DEPRECATED HCO3 PLAS-SCNC: 22 MMOL/L (ref 22–29)
EGFRCR SERPLBLD CKD-EPI 2021: 57 ML/MIN/1.73M2
ERYTHROCYTE [DISTWIDTH] IN BLOOD BY AUTOMATED COUNT: 14.6 % (ref 10–15)
GLUCOSE BLDC GLUCOMTR-MCNC: 118 MG/DL (ref 70–99)
GLUCOSE BLDC GLUCOMTR-MCNC: 138 MG/DL (ref 70–99)
GLUCOSE BLDC GLUCOMTR-MCNC: 142 MG/DL (ref 70–99)
GLUCOSE BLDC GLUCOMTR-MCNC: 154 MG/DL (ref 70–99)
GLUCOSE SERPL-MCNC: 124 MG/DL (ref 70–99)
HCT VFR BLD AUTO: 40.6 % (ref 40–53)
HGB BLD-MCNC: 13.2 G/DL (ref 13.3–17.7)
LVEF ECHO: NORMAL
MCH RBC QN AUTO: 27.3 PG (ref 26.5–33)
MCHC RBC AUTO-ENTMCNC: 32.5 G/DL (ref 31.5–36.5)
MCV RBC AUTO: 84 FL (ref 78–100)
PLATELET # BLD AUTO: 179 10E3/UL (ref 150–450)
POTASSIUM SERPL-SCNC: 4.7 MMOL/L (ref 3.4–5.3)
RBC # BLD AUTO: 4.84 10E6/UL (ref 4.4–5.9)
SODIUM SERPL-SCNC: 139 MMOL/L (ref 135–145)
WBC # BLD AUTO: 6.6 10E3/UL (ref 4–11)

## 2024-04-22 PROCEDURE — G0378 HOSPITAL OBSERVATION PER HR: HCPCS

## 2024-04-22 PROCEDURE — 93306 TTE W/DOPPLER COMPLETE: CPT | Mod: 26 | Performed by: GENERAL ACUTE CARE HOSPITAL

## 2024-04-22 PROCEDURE — 80048 BASIC METABOLIC PNL TOTAL CA: CPT

## 2024-04-22 PROCEDURE — 36415 COLL VENOUS BLD VENIPUNCTURE: CPT

## 2024-04-22 PROCEDURE — 85027 COMPLETE CBC AUTOMATED: CPT

## 2024-04-22 PROCEDURE — 99233 SBSQ HOSP IP/OBS HIGH 50: CPT | Performed by: INTERNAL MEDICINE

## 2024-04-22 PROCEDURE — 82962 GLUCOSE BLOOD TEST: CPT

## 2024-04-22 PROCEDURE — 120N000001 HC R&B MED SURG/OB

## 2024-04-22 PROCEDURE — 255N000002 HC RX 255 OP 636: Performed by: INTERNAL MEDICINE

## 2024-04-22 PROCEDURE — 99223 1ST HOSP IP/OBS HIGH 75: CPT | Performed by: PSYCHIATRY & NEUROLOGY

## 2024-04-22 PROCEDURE — 97530 THERAPEUTIC ACTIVITIES: CPT | Mod: GP

## 2024-04-22 PROCEDURE — 250N000013 HC RX MED GY IP 250 OP 250 PS 637

## 2024-04-22 PROCEDURE — 97162 PT EVAL MOD COMPLEX 30 MIN: CPT | Mod: GP

## 2024-04-22 PROCEDURE — 250N000012 HC RX MED GY IP 250 OP 636 PS 637

## 2024-04-22 PROCEDURE — G0463 HOSPITAL OUTPT CLINIC VISIT: HCPCS | Mod: 25

## 2024-04-22 RX ORDER — GLIMEPIRIDE 1 MG/1
1 TABLET ORAL
Status: DISCONTINUED | OUTPATIENT
Start: 2024-04-23 | End: 2024-04-23 | Stop reason: HOSPADM

## 2024-04-22 RX ADMIN — PANTOPRAZOLE SODIUM 40 MG: 40 TABLET, DELAYED RELEASE ORAL at 08:45

## 2024-04-22 RX ADMIN — CLOPIDOGREL BISULFATE 75 MG: 75 TABLET ORAL at 08:44

## 2024-04-22 RX ADMIN — TAMSULOSIN HYDROCHLORIDE 0.4 MG: 0.4 CAPSULE ORAL at 08:44

## 2024-04-22 RX ADMIN — POLYMYXIN B SULFATE AND TRIMETHOPRIM 2 DROP: 10000; 1 SOLUTION OPHTHALMIC at 17:17

## 2024-04-22 RX ADMIN — GABAPENTIN 200 MG: 100 CAPSULE ORAL at 08:45

## 2024-04-22 RX ADMIN — ASPIRIN 81 MG CHEWABLE TABLET 81 MG: 81 TABLET CHEWABLE at 08:44

## 2024-04-22 RX ADMIN — LOSARTAN POTASSIUM 12.5 MG: 25 TABLET, FILM COATED ORAL at 08:45

## 2024-04-22 RX ADMIN — POLYMYXIN B SULFATE AND TRIMETHOPRIM 2 DROP: 10000; 1 SOLUTION OPHTHALMIC at 21:39

## 2024-04-22 RX ADMIN — GABAPENTIN 200 MG: 100 CAPSULE ORAL at 19:06

## 2024-04-22 RX ADMIN — RIVAROXABAN 20 MG: 10 TABLET, FILM COATED ORAL at 17:17

## 2024-04-22 RX ADMIN — SPIRONOLACTONE 25 MG: 25 TABLET, FILM COATED ORAL at 08:45

## 2024-04-22 RX ADMIN — SIMVASTATIN 40 MG: 40 TABLET, FILM COATED ORAL at 21:38

## 2024-04-22 RX ADMIN — FUROSEMIDE 40 MG: 20 TABLET ORAL at 08:45

## 2024-04-22 RX ADMIN — ACETAMINOPHEN 1000 MG: 500 TABLET ORAL at 08:44

## 2024-04-22 RX ADMIN — POLYMYXIN B SULFATE AND TRIMETHOPRIM 2 DROP: 10000; 1 SOLUTION OPHTHALMIC at 13:32

## 2024-04-22 RX ADMIN — POLYMYXIN B SULFATE AND TRIMETHOPRIM 2 DROP: 10000; 1 SOLUTION OPHTHALMIC at 08:46

## 2024-04-22 RX ADMIN — PERFLUTREN 3 ML: 6.52 INJECTION, SUSPENSION INTRAVENOUS at 15:33

## 2024-04-22 RX ADMIN — ACETAMINOPHEN 1000 MG: 500 TABLET ORAL at 19:06

## 2024-04-22 RX ADMIN — CETIRIZINE HYDROCHLORIDE 5 MG: 5 TABLET ORAL at 08:45

## 2024-04-22 RX ADMIN — INSULIN ASPART 1 UNITS: 100 INJECTION, SOLUTION INTRAVENOUS; SUBCUTANEOUS at 08:31

## 2024-04-22 ASSESSMENT — ACTIVITIES OF DAILY LIVING (ADL)
ADLS_ACUITY_SCORE: 27
ADLS_ACUITY_SCORE: 29
ADLS_ACUITY_SCORE: 27
ADLS_ACUITY_SCORE: 28
ADLS_ACUITY_SCORE: 27
ADLS_ACUITY_SCORE: 29
ADLS_ACUITY_SCORE: 27
ADLS_ACUITY_SCORE: 29
ADLS_ACUITY_SCORE: 29
ADLS_ACUITY_SCORE: 28
ADLS_ACUITY_SCORE: 27
ADLS_ACUITY_SCORE: 27
ADLS_ACUITY_SCORE: 29
ADLS_ACUITY_SCORE: 29
ADLS_ACUITY_SCORE: 28
ADLS_ACUITY_SCORE: 27
ADLS_ACUITY_SCORE: 29
ADLS_ACUITY_SCORE: 28

## 2024-04-22 NOTE — DISCHARGE INSTRUCTIONS
WOC DISCHARGE INSTRUCTIONS:  Bilat heel wound(s): Daily  and PRN if dressing soiled, saturated or falls off  Cleanse entire foot with soap and water and dry  Per patient preference, place mepilex on heel to protect.  Apply sween cream generously to feet and any dry skin

## 2024-04-22 NOTE — PLAN OF CARE
Goal Outcome Evaluation:       Ever scored 0-1 on NIHSS scale. He has weakness in R arm. He sat in recliner for several hours. Good appetite. Call light in reach.

## 2024-04-22 NOTE — CONSULTS
NEUROLOGY CONSULTATION NOTE     Ever Crane,  1945, MRN [id number] Date: 2024     [facility]   Code status:  Full Code   PCP: Adriano Dodd Physician, None      ASSESSMENT & PLAN   Diagnosis code: Stroke    Stroke  History of diabetes/hypertension  History of atrial fibrillation    MRI brain with acute stroke in the posterior lateral aspect of the left basal ganglia  Check carotid ultrasound  Echocardiogram shows 50 to 55% ejection fraction  Patient was on Xarelto and Plavix at home.  Will add aspirin for 3 more weeks.  Lipid panel and statin  Blood pressure can be normalized  PT/OT     Chief Complaint   Patient presents with    Generalized Weakness        HISTORY OF PRESENT ILLNESS     We have been requested by Dr. Devi to evaluate Ever Crane who is a 79 year old  male for stroke.  This is a 17-year-old male with history of atrial fibrillation, heart failure, acute kidney injury, carotid stenosis, diabetes, hypertension, below the ankle amputation bilaterally scented with a 4-day history of left-sided weakness.  Patient has been unable to write and has had difficulty with ambulation with his walker.  His speech has been clear without any facial droop.  Denies any provoking factors.  Feels strong on the right side.  Does have numbness in the feet related to diabetes and peripheral arterial disease and possibly neuropathy.  Symptoms have not improved since he has been in the hospital.     PAST MEDICAL & SURGICAL HISTORY     Medical History  Past Medical History:   Diagnosis Date    A-fib (H)     Acute diastolic heart failure (H) 2022    Acute posthemorrhagic anemia 2022    MESHA (acute kidney injury) (H24)     Anemia     Atopic keratoconjunctivitis     Atrial fibrillation (H)     Brian Moe: 2011 Cardioversion; CHADS2 VASC = 5; he is on warfarin and sotalol     Atrial flutter (H)     Mcgarry's esophagus 2012    per note of Dr. Tavo Mike of [facility]    Bilateral lower leg  cellulitis 08/31/2018    BPH (benign prostatic hyperplasia)     Candidiasis of perineum 01/03/2018    Carotid stenosis     Carotid stenosis, asymptomatic, right     Cellulitis     CHF (congestive heart failure) (H)     Cholecystitis 10/14/2019    Cholecystitis, acute 08/18/2019    Chronic systolic heart failure (H)     Chronic venous stasis dermatitis     Closed nondisplaced intertrochanteric fracture of left femur with routine healing, subsequent encounter 05/18/2022    Clostridium difficile colitis     Contact with and (suspected) exposure to covid-19 12/13/2021    COPD (chronic obstructive pulmonary disease) (H)     Coronary artery disease due to lipid rich plaque 2000    CABG x2    Coronary atherosclerosis     Diabetes (H)     Diabetic ulcer of both feet (H) 10/31/2017    Diabetic ulcer of toe of right foot associated with diabetes mellitus due to underlying condition, limited to breakdown of skin (H) 01/05/2023    Diabetic ulcer of toe of right foot associated with diabetes mellitus due to underlying condition, with necrosis of bone (H) 01/05/2023    Dyslexia     Dyslipidemia, goal LDL below 70 2000    Epistaxis     Essential hypertension     Gangrene of left foot (H)     GERD (gastroesophageal reflux disease)     HLD (hyperlipidemia)     HTN (hypertension)     Hyponatremia     Ischemic heart disease     Lymphedema     Mild cognitive impairment     MRSA (methicillin resistant Staphylococcus aureus)     Neuropathy     Non-STEMI (non-ST elevated myocardial infarction) (H)     Occlusion and stenosis of right carotid artery     Osteoarthrosis     Osteomyelitis of ankle and foot (H)     Other atopic dermatitis     PAD (peripheral artery disease) (H24)     Peripheral vascular disease (H24)     Pneumonia 06/26/2018    Polyneuropathy due to type 2 diabetes mellitus (H)     Pressure ulcer, heel     Bilateral    Renal insufficiency     Type 2 diabetes mellitus, without long-term current use of insulin (H)     Ulcer of  right foot (H)     Unable to function independently 11/13/2017     Surgical History  Past Surgical History:   Procedure Laterality Date    AMPUTATE FOOT Left 11/05/2017    Procedure: LEFT TRANSMETATARSAL AMPUTATION;  Surgeon: Ever Wick MD;  Location: [facility];  Service:     AMPUTATE FOOT Right 4/27/2023    Procedure: Transmetatarsal amputation right foot;  Surgeon: Miguelangel Spears DPM;  [facility]    AMPUTATE FOOT Right 5/15/2023    Procedure: partial calcanectomy;  Surgeon: Miguelangel Spears DPM;  [facility]    BYPASS GRAFT ARTERY CORONARY N/A 03/06/2000    SVG to OM1, SVG to PDA    BYPASS GRAFT ARTERY CORONARY N/A 10/04/2018    redo CABG due to graft failure    CABG MEASURES GRP      CARDIOVERSION  08/25/2011    CV CORONARY ANGIOGRAM N/A 10/01/2018    Procedure: Coronary Angiogram;  Surgeon: Miki Mac MD;  Location: [facility];  Service:     INCISION AND DRAINAGE FOOT, COMBINED Right 5/15/2023    Procedure: INCISION AND DRAINAGE, right heel with,;  Surgeon: Miguelangel Spears DPM;  [facility]    INCISION AND DRAINAGE FOOT, COMBINED Bilateral 6/1/2023    Procedure: INCISION AND DRAINAGE, right foot with debridement of ulceration bilateral heels;  Surgeon: Miguelangel Spears DPM;  [facility]    INGUINAL HERNIA REPAIR Bilateral 1969    and 1979    IR EXTREMITY ANGIOGRAM BILATERAL  11/3/2017    IR LOWER EXTREMITY ANGIOGRAM LEFT  3/10/2023    IR LOWER EXTREMITY ANGIOGRAM RIGHT  1/24/2023    LAPAROSCOPIC CHOLECYSTECTOMY N/A 08/18/2019    Procedure: CHOLECYSTECTOMY, LAPAROSCOPIC;  Surgeon: Stewart Fountain MD;  Location: [facility];  Service: General    LENGTHEN TENDON ACHILLES Right 4/27/2023    Procedure: with Achilles tendon lengthening, debridement of right heel ulceration.;  Surgeon: Miguelangel Spears DPM;  [facility]        SOCIAL HISTORY     Social History      Tobacco Use    Smoking status: Former     Current packs/day: 0.00     Average packs/day: 1 pack/day for 36.0 years (36.0 ttl pk-yrs)     Types: Cigarettes     Start date: 1964     Quit date: 2000     Years since quittin.9    Smokeless tobacco: Never   Substance Use Topics    Alcohol use: Not Currently     Alcohol/week: 5.0 standard drinks of alcohol     Types: 1 Cans of beer, 4 Standard drinks or equivalent per week    Drug use: No        FAMILY HISTORY     Reviewed, and family history includes CABG in his father; Esophageal Cancer (age of onset: 58.00) in his father; No Known Problems in his grandchild, grandchild, sister, sister, and son; Obesity in his sister; Osteoarthritis in his sister; Sudden Death (age of onset: 85.00) in his mother; Valvular heart disease in his father.     ALLERGIES     Allergies   Allergen Reactions    Lanolin      Other Reaction(s): Not available    Cranberry Extract Itching and Rash    Doxycycline Rash    Latex Rash    Penicillin V Rash     Reaction occurred as a child. Patient tolerated Zosyn 2018, Cefazolin 10/2018, and has also tolerated Augmentin.    Penicillins Rash     Reaction occurred as a child. Patient tolerated Zosyn 2018, Cefazolin 10/2018, and has also tolerated Augmentin.        REVIEW OF SYSTEMS     12 System ROS was done other than the HPI this was negative.  Pertinent positives noted in the HPI.     HOME & HOSPITAL MEDICATIONS     Prior to Admission Medications  Medications Prior to Admission   Medication Sig Dispense Refill Last Dose    acetaminophen (TYLENOL) 325 MG tablet Take 650 mg by mouth 2 times daily as needed for mild pain   Past Month at prn    acetaminophen (TYLENOL) 500 MG tablet Take 1,000 mg by mouth 2 times daily   2024 at am    albuterol (PROAIR HFA/PROVENTIL HFA/VENTOLIN HFA) 108 (90 Base) MCG/ACT inhaler Inhale 2 puffs into the lungs 2 times daily as needed   Unknown at prn    cetirizine (ZYRTEC) 5 MG tablet Take 5 mg by  mouth daily   4/20/2024 at pm    clopidogrel (PLAVIX) 75 MG tablet Take 75 mg by mouth daily   4/21/2024 at am    dulaglutide (TRULICITY) 0.75 MG/0.5ML pen Inject 1.5 mg Subcutaneous every 7 days   Past Week at Tuesdays    furosemide (LASIX) 40 MG tablet Take 40 mg by mouth daily   4/21/2024 at am    gabapentin (NEURONTIN) 100 MG capsule Take 200 mg by mouth 2 times daily   4/21/2024 at am    glimepiride (AMARYL) 1 MG tablet Take 1 mg by mouth every morning (before breakfast)   4/21/2024 at am    hypromellose (ARTIFICIAL TEARS) 0.5 % SOLN ophthalmic solution Place 1 drop Into the left eye 4 times daily as needed for dry eyes   Past Month at prn    ketoconazole (NIZORAL) 2 % external shampoo Apply topically twice a week Mon and Fri.   Past Week    losartan (COZAAR) 25 MG tablet Take 12.5 mg by mouth daily   4/21/2024 at am    naloxone (NARCAN) 0.4 MG/ML injection Inject 0.1-0.4 mg into the muscle once as needed for opioid reversal   Unknown at prn    omeprazole (PRILOSEC) 20 MG CR capsule Take 20 mg by mouth daily   4/21/2024 at am    rivaroxaban ANTICOAGULANT (XARELTO) 15 MG TABS tablet Take 20 mg by mouth daily   4/20/2024 at pm    simvastatin (ZOCOR) 40 MG tablet Take 40 mg by mouth At Bedtime   4/20/2024 at hs    spironolactone (ALDACTONE) 25 MG tablet Take 25 mg by mouth daily   4/21/2024 at am    tamsulosin (FLOMAX) 0.4 MG capsule Take 0.4 mg by mouth daily   4/21/2024 at am       Hospital Medications  Current Facility-Administered Medications   Medication Dose Route Frequency Provider Last Rate Last Admin    acetaminophen (TYLENOL) tablet 1,000 mg  1,000 mg Oral BID LabSandra arceo NP   1,000 mg at 04/22/24 0844    aspirin EC tablet 81 mg  81 mg Oral Daily LaboltSandra NP        Or    aspirin (ASA) chewable tablet 81 mg  81 mg Oral or NG Tube Daily LabSandra arceo NP   81 mg at 04/22/24 0844    cetirizine (zyrTEC) tablet 5 mg  5 mg Oral Daily Sandra Azul NP   5 mg at 04/22/24 0845     clopidogrel (PLAVIX) tablet 75 mg  75 mg Oral Daily LaboltSandra, NP   75 mg at 04/22/24 0844    furosemide (LASIX) tablet 40 mg  40 mg Oral Daily LaboltSandra K, NP   40 mg at 04/22/24 0845    gabapentin (NEURONTIN) capsule 200 mg  200 mg Oral BID LaboltSandra, NP   200 mg at 04/22/24 0845    [START ON 4/23/2024] glimepiride (AMARYL) tablet 1 mg  1 mg Oral QAM AC Chacah Devi MD        insulin aspart (NovoLOG) injection (RAPID ACTING)  1-7 Units Subcutaneous TID AC LabolSandra jason, NP   1 Units at 04/22/24 0831    insulin aspart (NovoLOG) injection (RAPID ACTING)  1-5 Units Subcutaneous At Bedtime LabSandra arceo, NP        losartan (COZAAR) half-tab 12.5 mg  12.5 mg Oral Daily LabolSandra jason, NP   12.5 mg at 04/22/24 0845    pantoprazole (PROTONIX) EC tablet 40 mg  40 mg Oral QAM AC LabolSandra jason, NP   40 mg at 04/22/24 0845    polymixin b-trimethoprim (POLYTRIM) ophthalmic solution 2 drop  2 drop Left Eye 4x Daily LabolSandra jason NP   2 drop at 04/22/24 1332    rivaroxaban ANTICOAGULANT (XARELTO) tablet 20 mg  20 mg Oral Daily with supper LabolSandra jason, NP   20 mg at 04/21/24 2146    simvastatin (ZOCOR) tablet 40 mg  40 mg Oral At Bedtime LabolSandra jason, NP   40 mg at 04/21/24 2139    sodium chloride (PF) 0.9% PF flush 3 mL  3 mL Intracatheter Q8H LabolSandra jason, NP   3 mL at 04/21/24 2140    spironolactone (ALDACTONE) tablet 25 mg  25 mg Oral Daily LaboltSandra K, NP   25 mg at 04/22/24 0845    tamsulosin (FLOMAX) capsule 0.4 mg  0.4 mg Oral Daily LaboltSandra, NP   0.4 mg at 04/22/24 0844        PHYSICAL EXAM     Vital signs  Temp:  [97.6  F (36.4  C)-98.4  F (36.9  C)] 98.4  F (36.9  C)  Pulse:  [67-87] 67  Resp:  [14-21] 18  BP: (122-136)/(60-83) 132/70  SpO2:  [93 %-98 %] 95 %    PHYSICAL EXAMINATION  VITALS: /70 (BP Location: Left arm)   Pulse 67   Temp 98.4  F (36.9  C) (Oral)   Resp 18   SpO2 95%   GENERAL -Health appearing, No  apparent distress  EYES- No scleral icterus, no eyelid droop, Pupils - see Neuro section  HEENT - Normocephalic, atraumatic, Hearing grossly intact; Oral mucosa moist and pink in color. External Ears and nose intact.   Neck - supple   PULM - Good spontaneous respiratory effort  CV-poor circulation in the legs.  MSK- Gait - see Neuro section; Strength and tone- see Neuro section; Range of motion grossly intact.  Below the ankle amputation bilaterally.  PSYCH -cooperative    Neurological  Mental status - Patient is awake and grossly oriented to self, place and time. Attention span is normal. Language is fluent and follows commands appropriately.   Cranial nerves - CN II-XII intact. Pupils are reactive and symmetric; EOMI, VFIT, NLF symmetric  Motor -mild weakness in the left arm and leg.  Antigravity strength in the right arm and leg.  Tone - Tone is symmetric bilaterally in upper and lower extremities.  Reflexes - Reflexes are absent throughout.  Sensation - Sensory exam is grossly intact to light touch, pain.  Coordination - Finger to nose is without dysmetria.  Gait and station --able to safely ambulate due to below the ankle amputation bilaterally.       DIAGNOSTIC STUDIES     Pertinent Radiology   MRI brain  IMPRESSION:  1.  Small acute/early subacute infarction along the posterior lateral aspect of left basal ganglia.  2.  No definite associated hemorrhage.  3.  No significant mass effect.  4.  Mild chronic small vessel ischemia.  5.  Moderate atrophy.    ECHO  1. Left ventricular chamber size and wall thickness are normal. Systolic  function is low normal. The visually estimated left ventricular ejection  fraction is 50-55% with [id number] variation due to the presence of atrial  flutter.  2. Right ventricular chamber size is normal. Systolic function is mildly  reduced.  3. Moderate left atrial enlargement. Mild right atrial enlargement.  4. Negative bubble study suggesting against the presence of  significant intra-  or extracardiac shunting.  5. No hemodynamically significant valvular abnormalities.  6. Compared to the prior study dated 6/26/2018, there has been no significant  change.      Recent Results (from the past 24 hour(s))   Basic metabolic panel    Collection Time: 04/21/24  5:07 PM   Result Value Ref Range    Sodium 137 135 - 145 mmol/L    Potassium 4.6 3.4 - 5.3 mmol/L    Chloride 103 98 - 107 mmol/L    Carbon Dioxide (CO2) 21 (L) 22 - 29 mmol/L    Anion Gap 13 7 - 15 mmol/L    Urea Nitrogen 37.3 (H) 8.0 - 23.0 mg/dL    Creatinine 1.43 (H) 0.67 - 1.17 mg/dL    GFR Estimate 50 (L) >60 mL/min/1.73m2    Calcium 9.7 8.8 - 10.2 mg/dL    Glucose 162 (H) 70 - 99 mg/dL   Hepatic function panel    Collection Time: 04/21/24  5:07 PM   Result Value Ref Range    Protein Total 7.9 6.4 - 8.3 g/dL    Albumin 4.0 3.5 - 5.2 g/dL    Bilirubin Total 0.7 <=1.2 mg/dL    Alkaline Phosphatase 97 40 - 150 U/L    AST 20 0 - 45 U/L    ALT 10 0 - 70 U/L    Bilirubin Direct <0.20 0.00 - 0.30 mg/dL   Lipase    Collection Time: 04/21/24  5:07 PM   Result Value Ref Range    Lipase 23 13 - 60 U/L   Magnesium    Collection Time: 04/21/24  5:07 PM   Result Value Ref Range    Magnesium 2.2 1.7 - 2.3 mg/dL   Troponin T, High Sensitivity    Collection Time: 04/21/24  5:07 PM   Result Value Ref Range    Troponin T, High Sensitivity 24 (H) <=22 ng/L   Nt probnp inpatient (BNP)    Collection Time: 04/21/24  5:07 PM   Result Value Ref Range    N terminal Pro BNP Inpatient 169 0 - 1,800 pg/mL   CK total    Collection Time: 04/21/24  5:07 PM   Result Value Ref Range     39 - 308 U/L   CBC with platelets and differential    Collection Time: 04/21/24  5:07 PM   Result Value Ref Range    WBC Count 8.2 4.0 - 11.0 10e3/uL    RBC Count 4.55 4.40 - 5.90 10e6/uL    Hemoglobin 12.1 (L) 13.3 - 17.7 g/dL    Hematocrit 38.4 (L) 40.0 - 53.0 %    MCV 84 78 - 100 fL    MCH 26.6 26.5 - 33.0 pg    MCHC 31.5 31.5 - 36.5 g/dL    RDW 14.6 10.0 - 15.0 %     Platelet Count 188 150 - 450 10e3/uL    % Neutrophils 63 %    % Lymphocytes 22 %    % Monocytes 8 %    % Eosinophils 6 %    % Basophils 1 %    % Immature Granulocytes 0 %    NRBCs per 100 WBC 0 <1 /100    Absolute Neutrophils 5.2 1.6 - 8.3 10e3/uL    Absolute Lymphocytes 1.8 0.8 - 5.3 10e3/uL    Absolute Monocytes 0.7 0.0 - 1.3 10e3/uL    Absolute Eosinophils 0.5 0.0 - 0.7 10e3/uL    Absolute Basophils 0.1 0.0 - 0.2 10e3/uL    Absolute Immature Granulocytes 0.0 <=0.4 10e3/uL    Absolute NRBCs 0.0 10e3/uL   Hemoglobin A1c    Collection Time: 04/21/24  5:07 PM   Result Value Ref Range    Hemoglobin A1C 7.0 (H) <5.7 %   UA with Microscopic reflex to Culture    Collection Time: 04/21/24  6:09 PM    Specimen: Urine, Clean Catch   Result Value Ref Range    Color Urine Light Yellow Colorless, Straw, Light Yellow, Yellow    Appearance Urine Clear Clear    Glucose Urine Negative Negative mg/dL    Bilirubin Urine Negative Negative    Ketones Urine Negative Negative mg/dL    Specific Gravity Urine 1.018 1.001 - 1.030    Blood Urine Negative Negative    pH Urine 5.5 5.0 - 7.0    Protein Albumin Urine Negative Negative mg/dL    Urobilinogen Urine <2.0 <2.0 mg/dL    Nitrite Urine Negative Negative    Leukocyte Esterase Urine Negative Negative    RBC Urine 1 <=2 /HPF    WBC Urine <1 <=5 /HPF   CT Chest/Abdomen/Pelvis w Contrast    Collection Time: 04/21/24  7:04 PM   Result Value Ref Range    Radiologist flags Pancreatic cyst, lung nodule    Troponin T, High Sensitivity (now)    Collection Time: 04/21/24  7:25 PM   Result Value Ref Range    Troponin T, High Sensitivity 24 (H) <=22 ng/L   INR    Collection Time: 04/21/24  7:25 PM   Result Value Ref Range    INR 1.61 (H) 0.85 - 1.15   Partial thromboplastin time    Collection Time: 04/21/24  7:25 PM   Result Value Ref Range    aPTT 38 22 - 38 Seconds   Lipid panel reflex to direct LDL: Non-fasting    Collection Time: 04/21/24  7:25 PM   Result Value Ref Range    Cholesterol 82  <200 mg/dL    Triglycerides 136 <150 mg/dL    Direct Measure HDL 28 (L) >=40 mg/dL    LDL Cholesterol Calculated 27 <=100 mg/dL    Non HDL Cholesterol 54 <130 mg/dL   Glucose by meter    Collection Time: 04/21/24  7:29 PM   Result Value Ref Range    GLUCOSE BY METER POCT 109 (H) 70 - 99 mg/dL   Glucose by meter    Collection Time: 04/21/24  9:37 PM   Result Value Ref Range    GLUCOSE BY METER POCT 106 (H) 70 - 99 mg/dL   Glucose by meter    Collection Time: 04/22/24  7:47 AM   Result Value Ref Range    GLUCOSE BY METER POCT 142 (H) 70 - 99 mg/dL   CBC with platelets    Collection Time: 04/22/24  8:00 AM   Result Value Ref Range    WBC Count 6.6 4.0 - 11.0 10e3/uL    RBC Count 4.84 4.40 - 5.90 10e6/uL    Hemoglobin 13.2 (L) 13.3 - 17.7 g/dL    Hematocrit 40.6 40.0 - 53.0 %    MCV 84 78 - 100 fL    MCH 27.3 26.5 - 33.0 pg    MCHC 32.5 31.5 - 36.5 g/dL    RDW 14.6 10.0 - 15.0 %    Platelet Count 179 150 - 450 10e3/uL   Basic metabolic panel    Collection Time: 04/22/24  8:00 AM   Result Value Ref Range    Sodium 139 135 - 145 mmol/L    Potassium 4.7 3.4 - 5.3 mmol/L    Chloride 104 98 - 107 mmol/L    Carbon Dioxide (CO2) 22 22 - 29 mmol/L    Anion Gap 13 7 - 15 mmol/L    Urea Nitrogen 29.1 (H) 8.0 - 23.0 mg/dL    Creatinine 1.28 (H) 0.67 - 1.17 mg/dL    GFR Estimate 57 (L) >60 mL/min/1.73m2    Calcium 9.4 8.8 - 10.2 mg/dL    Glucose 124 (H) 70 - 99 mg/dL   Glucose by meter    Collection Time: 04/22/24  1:15 PM   Result Value Ref Range    GLUCOSE BY METER POCT 138 (H) 70 - 99 mg/dL   Echocardiogram Complete - For age > 60 yrs    Collection Time: 04/22/24  3:25 PM   Result Value Ref Range    LVEF  50-55%    Glucose by meter    Collection Time: 04/22/24  4:43 PM   Result Value Ref Range    GLUCOSE BY METER POCT 118 (H) 70 - 99 mg/dL       Total time spent for face to face visit, reviewing labs/imaging studies, counseling and coordination of care was: Over 80 min More than 50% of this time was spent on counseling and  coordination of care.    Counseling patient.  Reviewing chart.  Coordination of care with the ER staff.  Coordination of care with the primary team.    Sean Burrows MD  Neurologist  [facility]  Tel:- [phone]

## 2024-04-22 NOTE — CONSULTS
[facility]  WO Nurse Inpatient Assessment     Consulted for: bilat heels    Summary: 4/22  Patient noted to have feet wrapped thoroughly and bloody dressings on upper legs.  Extremely dry skin throughout.  Patient unaware of how injuries occurred or that he should be using lotion, it has been ordered by other wound care providers in the past.    Patient History (according to provider note(s):      Ever Crane is a 79 year old male with PMH A-fib, MESHA, type 2 diabetes, HTN, MRSA infection, CHF, PAD, HLD, bilateral heel pressure ulcers, and NSTEMI s/p CABG admitted on 4/21/2024 after arriving to the ED with complaints of right-sided weakness x 4 days. Positive for CVA on mri.     Assessment:      Areas visualized during today's visit: Lower extremities     Skin Injury Location: bilat heels       Left heel                                                          right heel  Last photo: 4/22  Skin injury due to: Unclear etiology  Skin history and plan of care:   patient stated he was unaware of how injuries occured  Affected area:      Skin assessment: Intact and Dry/flaky     Measurements (length x width x depth, in cm) 1 cm  x 1 cm  both heels     Color: normal and consistent with surrounding tissue     Temperature  normal      Drainage: none .      Color: none      Odor: none  Pain: denies , none  Pain interventions prior to dressing change: N/A  Treatment goal: Protection  STATUS: initial assessment  Supplies ordered: supplies stored on unit       Treatment Plan:     Bilat heel wound(s): Daily  and PRN if dressing soiled, saturated or falls off  Cleanse entire foot with soap and water and dry  Per patient preference, place mepilex on heel to protect.  Apply sween cream generously to feet and any dry skin    Orders: Written    RECOMMEND PRIMARY TEAM ORDER: None, at this time  Education provided: plan of care and Moisture management  Discussed plan of care with: Patient  WO nurse follow-up  plan: every other week  Notify WO if wound(s) deteriorate.  Nursing to notify the Provider(s) and re-consult the WOC Nurse if new skin concern.    DATA:     Current support surface: Standard  Standard gel/foam mattress (IsoFlex, Atmos air, etc)  Containment of urine/stool: Brief  BMI: There is no height or weight on file to calculate BMI.   Active diet order: Orders Placed This Encounter      Combination Diet Moderate Consistent Carb (60 g CHO per Meal) Diet     Output: I/O last 3 completed shifts:  In: -   Out: 950 [Urine:950]     Labs:   Recent Labs   Lab 04/22/24  0800 04/21/24  1925 04/21/24  1707   ALBUMIN  --   --  4.0   HGB 13.2*  --  12.1*   INR  --  1.61*  --    WBC 6.6  --  8.2   A1C  --   --  7.0*     Pressure injury risk assessment:   Sensory Perception: 3-->slightly limited  Moisture: 4-->rarely moist  Activity: 2-->chairfast  Mobility: 3-->slightly limited  Nutrition: 3-->adequate  Friction and Shear: 3-->no apparent problem  Clint Score: 18    AURY GabrielN RN [facility] services  Pager no longer in use, please contact through [employer] group: [facility] [employer]

## 2024-04-22 NOTE — PHARMACY-ADMISSION MEDICATION HISTORY
Pharmacist Admission Medication History    Admission medication history is complete. The information provided in this note is only as accurate as the sources available at the time of the update.    Information Source(s): Facility ([facility]/NH/) medication list/MAR via N/A    Pertinent Information: Med list [place]     Changes made to PTA medication list:  Added: None  Deleted: None  Changed: None    Allergies reviewed with patient and updates made in EHR: yes    Medication History Completed By: SHABNAM VALENTINE [facility] 4/21/2024 7:41 PM    PTA Med List   Medication Sig Last Dose    acetaminophen (TYLENOL) 325 MG tablet Take 650 mg by mouth 2 times daily as needed for mild pain Past Month at prn    acetaminophen (TYLENOL) 500 MG tablet Take 1,000 mg by mouth 2 times daily 4/21/2024 at am    albuterol (PROAIR HFA/PROVENTIL HFA/VENTOLIN HFA) 108 (90 Base) MCG/ACT inhaler Inhale 2 puffs into the lungs 2 times daily as needed Unknown at prn    cetirizine (ZYRTEC) 5 MG tablet Take 5 mg by mouth daily 4/20/2024 at pm    clopidogrel (PLAVIX) 75 MG tablet Take 75 mg by mouth daily 4/21/2024 at am    dulaglutide (TRULICITY) 0.75 MG/0.5ML pen Inject 1.5 mg Subcutaneous every 7 days Past Week at Tuesdays    furosemide (LASIX) 40 MG tablet Take 40 mg by mouth daily 4/21/2024 at am    gabapentin (NEURONTIN) 100 MG capsule Take 200 mg by mouth 2 times daily 4/21/2024 at am    glimepiride (AMARYL) 1 MG tablet Take 1 mg by mouth every morning (before breakfast) 4/21/2024 at am    hypromellose (ARTIFICIAL TEARS) 0.5 % SOLN ophthalmic solution Place 1 drop Into the left eye 4 times daily as needed for dry eyes Past Month at prn    ketoconazole (NIZORAL) 2 % external shampoo Apply topically twice a week Mon and Fri. Past Week    losartan (COZAAR) 25 MG tablet Take 12.5 mg by mouth daily 4/21/2024 at am    naloxone (NARCAN) 0.4 MG/ML injection Inject 0.1-0.4 mg into the muscle once as needed for opioid reversal Unknown at prn     omeprazole (PRILOSEC) 20 MG CR capsule Take 20 mg by mouth daily 4/21/2024 at am    rivaroxaban ANTICOAGULANT (XARELTO) 15 MG TABS tablet Take 20 mg by mouth daily 4/20/2024 at pm    simvastatin (ZOCOR) 40 MG tablet Take 40 mg by mouth At Bedtime 4/20/2024 at hs    spironolactone (ALDACTONE) 25 MG tablet Take 25 mg by mouth daily 4/21/2024 at am    tamsulosin (FLOMAX) 0.4 MG capsule Take 0.4 mg by mouth daily 4/21/2024 at am

## 2024-04-22 NOTE — PROGRESS NOTES
"[facility]    Medicine Progress Note - Hospitalist Service    Date of Admission:  4/21/2024    Assessment & Plan      Ever Crane is a 79 year old male with PMH A-fib, MESHA, type 2 diabetes, HTN, MRSA infection, CHF, PAD, HLD, bilateral heel pressure ulcers, and NSTEMI s/p CABG admitted on 4/21/2024 after arriving to the ED with complaints of right-sided weakness x 4 days. Positive for CVA on mri.     CVA  Brain MRI reveals small acute/early subacute infarction along the posterior lateral aspect of left basal ganglia  Add aspirin per neurology (pt was on Plavix and Xarelto PTA)  Neurochecks every 4 hours  PT/OT: TCU vs back to NH with pt; await SW  Swallow eval/speech eval: deemed as no need as per ST  Echo pending    A flutter  Cardiac monitor  PTA Xarelto    Acute left conjunctivitis  Polymixin b-trimethoprim drops    Bilateral heel wounds  WOC consult    Type 2 diabetes  Inpatient sliding scale insulin  Hg A1c 7  Continue PTA Amaryl  Hold PTA Trulicity    HLD  PTA simvastatin    CHF  PTA furosemide, spironolactone    BPH  PTA tamsulosin    HTN  PTA losartan       Diet: Combination Diet Moderate Consistent Carb (60 g CHO per Meal) Diet    DVT Prophylaxis: DOAC  Dahl Catheter: Not present  Lines: PRESENT             Cardiac Monitoring: ACTIVE order. Indication: Stroke, acute (48 hours)  Code Status: Full Code      Clinically Significant Risk Factors Present on Admission               # Drug Induced Coagulation Defect: home medication list includes an anticoagulant medication  # Drug Induced Platelet Defect: home medication list includes an antiplatelet medication   # Hypertension: Noted on problem list   # Dementia: noted on problem list   # DMII: A1C = 7.0 % (Ref range: <5.7 %) within past 6 months    # Overweight: Estimated body mass index is 26.96 kg/m  as calculated from the following:    Height as of 4/17/24: 1.778 m (5' 10\").    Weight as of 4/17/24: 85.2 kg (187 lb 14.4 oz).      "   # History of CABG: noted on surgical history       Disposition Plan     Medically Ready for Discharge: Anticipated Tomorrow    Await neurology consultation, echo, sw for placement         Chacha Devi MD  Hospitalist Service  [facility]  Securely message with [employer] (more info)  Text page via [employer] Paging/Directory   ______________________________________________________________________    Interval History   Similar weakness on right side, denies new focal deficits, no cp/sob, no f/c    Physical Exam   Vital Signs: Temp: 97.6  F (36.4  C) Temp src: Oral BP: 123/65 Pulse: 67   Resp: 18 SpO2: 94 % O2 Device: None (Room air)    Weight: 0 lbs 0 oz    General.  Awake alert  not in acute distress.  HEENT.  Pupils equal round react to light, anicteric, EOM intact.  Neck supple no JVD.  CVS regular rhythm no murmur gallops.  Lungs.  Clear to auscultation bilateral no wheezing or rales.  Abdomen.  Soft nontender bowel sounds present.  Extremities.  No edema no calf tenderness.  Neurological.  Awake and alert. Right arm 4/5. Right leg 2/5. With h/o foot amputation  Skin no rash. No pallor.  Psych. Impaired memory  .    Medical Decision Making       45 MINUTES SPENT BY ME on the date of service doing chart review, history, exam, documentation & further activities per the note.      Data     I have personally reviewed the following data over the past 24 hrs:    6.6  \   13.2 (L)   / 179     139 104 29.1 (H) /  124 (H)   4.7 22 1.28 (H) \     ALT: 10 AST: 20 AP: 97 TBILI: 0.7   ALB: 4.0 TOT PROTEIN: 7.9 LIPASE: 23     Trop: 24 (H) BNP: 169     TSH: N/A T4: N/A A1C: 7.0 (H)     INR:  1.61 (H) PTT:  38   D-dimer:  N/A Fibrinogen:  N/A       Imaging results reviewed over the past 24 hrs:   Recent Results (from the past 24 hour(s))   MR Brain w/o & w Contrast    Narrative    EXAM: MR BRAIN W/O and W CONTRAST  LOCATION: [facility]  DATE: 4/21/2024    INDICATION: right upper and  lower extremity weakness onset 4 days ago  COMPARISON: CT head 10/05/2018  CONTRAST: 9ml gadavist  TECHNIQUE: Routine multiplanar multisequence head MRI without and with intravenous contrast.    FINDINGS:  INTRACRANIAL CONTENTS: Small acute/early subacute infarction along the posterior lateral aspect of left basal ganglia. No definite associated hemorrhage. No significant mass effect. No mass, acute hemorrhage, or extra-axial fluid collections. Scattered   nonspecific T2/FLAIR hyperintensities within the cerebral white matter most consistent with mild chronic microvascular ischemic change. Moderate generalized cerebral atrophy. No hydrocephalus. Diffuse atrophy of corpus callosum. Normal position of the   cerebellar tonsils. No pathologic contrast enhancement.    SELLA: No abnormality accounting for technique.    OSSEOUS STRUCTURES/SOFT TISSUES: Normal marrow signal. The major intracranial vascular flow voids are maintained.     ORBITS: No abnormality accounting for technique.     SINUSES/MASTOIDS: Moderate to marked mucosal thickening paranasal sinuses. Opacification right mastoid air cells.       Impression    IMPRESSION:  1.  Small acute/early subacute infarction along the posterior lateral aspect of left basal ganglia.  2.  No definite associated hemorrhage.  3.  No significant mass effect.  4.  Mild chronic small vessel ischemia.  5.  Moderate atrophy.   CT Chest/Abdomen/Pelvis w Contrast   Result Value    Radiologist flags Pancreatic cyst, lung nodule    Narrative    EXAM: CT CHEST/ABDOMEN/PELVIS W CONTRAST  LOCATION: [facility]  DATE: 4/21/2024    INDICATION: feels weak, intermittent left sided abdominal pain, heart failure hx  COMPARISON: None.  TECHNIQUE: CT scan of the chest, abdomen, and pelvis was performed following injection of IV contrast. Multiplanar reformats were obtained. Dose reduction techniques were used.   CONTRAST: 75ml isovue 370    FINDINGS:   LUNGS AND PLEURA:  Scattered airway secretions and bronchiectasis. Mild emphysema. Subpleural atelectasis in/or scarring are seen within both lung bases. 5 mm right upper lobe nodule (series 4 image 132). No pleural effusion or pneumothorax.    MEDIASTINUM/AXILLAE: No pathologically enlarged thoracic lymph nodes. Unremarkable thyroid. Normal caliber thoracic aorta. Normal heart size. No pericardial effusion.    CORONARY ARTERY CALCIFICATION: Severe.    HEPATOBILIARY: Subcentimeter hypoattenuating lesion/lesions are too small to characterize. No biliary dilation. Cholecystectomy.     PANCREAS: 2.1 cm hypoattenuating lesion within the pancreatic head/uncinate (series 3 image 172).      SPLEEN: Normal.     ADRENAL GLANDS: Normal.     KIDNEYS/BLADDER: Benign renal cysts and/or probable cysts warrant no further follow up. Nonobstructing left nephrolithiasis. Normal urinary bladder.      BOWEL: No obstruction. Normal appendix. No bowel wall thickening or pneumatosis. No pneumoperitoneum or free fluid.     LYMPH NODES: No pathologically enlarged lymph nodes.    VASCULATURE: Nonaneurysmal aorta with severe aortoiliac calcifications.     PELVIC ORGANS: Prostatomegaly.     MUSCULOSKELETAL: Multilevel degenerative disc disease of the thoracolumbar spine. Left proximal femur hardware.       Impression    IMPRESSION:  1.  No acute abnormalities in the chest, abdomen, or pelvis.  2.  2.1 cm cystic lesion in the pancreatic head/uncinate. Recommend nonemergent/outpatient MRI/MRCP for further evaluation.  3.  5 mm right upper lobe nodule. Correlate with risk factors and consider follow-up chest CT in 12 months.  4.  Chronic and ancillary findings as described.    [Consider Follow Up: Pancreatic cyst, lung nodule]    This report will be copied to the [facility] to ensure a provider acknowledges the finding.

## 2024-04-22 NOTE — ED NOTES
[facility] ED Handoff Report     ED Chief Complaint: gen weakness    ED Diagnosis:  (I63.9) Infarction of left basal ganglia (H)  Comment:   Plan:        PMH:    Past Medical History:   Diagnosis Date    A-fib (H)     Acute diastolic heart failure (H) 06/07/2022    Acute posthemorrhagic anemia 05/17/2022    MESHA (acute kidney injury) (H24)     Anemia     Atopic keratoconjunctivitis     Atrial fibrillation (H)     Brian Moe: 8/2011 Cardioversion; CHADS2 VASC = 5; he is on warfarin and sotalol     Atrial flutter (H)     Mcgarry's esophagus 01/01/2012    per note of Dr. Tavo Mike of [facility]    Bilateral lower leg cellulitis 08/31/2018    BPH (benign prostatic hyperplasia)     Candidiasis of perineum 01/03/2018    Carotid stenosis     Carotid stenosis, asymptomatic, right     Cellulitis     CHF (congestive heart failure) (H)     Cholecystitis 10/14/2019    Cholecystitis, acute 08/18/2019    Chronic systolic heart failure (H)     Chronic venous stasis dermatitis     Closed nondisplaced intertrochanteric fracture of left femur with routine healing, subsequent encounter 05/18/2022    Clostridium difficile colitis     Contact with and (suspected) exposure to covid-19 12/13/2021    COPD (chronic obstructive pulmonary disease) (H)     Coronary artery disease due to lipid rich plaque 2000    CABG x2    Coronary atherosclerosis     Diabetes (H)     Diabetic ulcer of both feet (H) 10/31/2017    Diabetic ulcer of toe of right foot associated with diabetes mellitus due to underlying condition, limited to breakdown of skin (H) 01/05/2023    Diabetic ulcer of toe of right foot associated with diabetes mellitus due to underlying condition, with necrosis of bone (H) 01/05/2023    Dyslexia     Dyslipidemia, goal LDL below 70 2000    Epistaxis     Essential hypertension     Gangrene of left foot (H)     GERD (gastroesophageal reflux disease)     HLD (hyperlipidemia)     HTN (hypertension)     Hyponatremia     Ischemic heart  disease     Lymphedema     Mild cognitive impairment     MRSA (methicillin resistant Staphylococcus aureus)     Neuropathy     Non-STEMI (non-ST elevated myocardial infarction) (H)     Occlusion and stenosis of right carotid artery     Osteoarthrosis     Osteomyelitis of ankle and foot (H)     Other atopic dermatitis     PAD (peripheral artery disease) (H24)     Peripheral vascular disease (H24)     Pneumonia 06/26/2018    Polyneuropathy due to type 2 diabetes mellitus (H)     Pressure ulcer, heel     Bilateral    Renal insufficiency     Type 2 diabetes mellitus, without long-term current use of insulin (H)     Ulcer of right foot (H)     Unable to function independently 11/13/2017        Code Status:  Prior     Falls Risk: Yes Band: Applied    Current Living Situation/Residence: lives in an assisted living facility     Elimination Status: Continent: Yes     Activity Level: 2 assist. Walker at BL, has been using WC past couple days d/t weakness    Patients Preferred Language:  English     Needed: No    Vital Signs:  /81   Pulse 87   Temp 98.1  F (36.7  C) (Oral)   Resp 21   SpO2 96%      Cardiac Rhythm: A flutter    Pain Score: 1/10    Is the Patient Confused:  No    Last Food or Drink: 04/21/24 at 2000    Focused Assessment: 80 yo male presents to ED with increased weakness x4 days. worse on his right side. He states that it is difficult for him to ambulate and use his right hand. This morning, he states that he fell back onto his toilet and landed on his buttocks. He denies hitting his head or loc.    On neuro check, pt is weaker on R arm and leg. +pronator drift to R arm. Rest of neuro exam unremarkable. MRI shows subacute infarct. Pt is a&ox4. Denies pain.     Pt has warm, red lower legs bilat with amputations to all toes bilat due to neuropathy. Both feet are dressed in gauze, clean and dry. Reports decreased sensation up to mid thighs bilat. Pt also reports ongoing left eye discharge and  irritation. He states that he has been using Artificial Tears at home.    Tests Performed: Done: Labs and Imaging    Treatments Provided:  see MAR    Family Dynamics/Concerns: No    Family Updated On Visitor Policy: Yes    Plan of Care Communicated to Family: Yes    Who Was Updated about Plan of Care: pt able to communicate with personal cell phone    Belongings Checklist Done and Signed by Patient: Yes    Belongings Sent with Patient: clothes, shoes, phone    Medications sent with patient:     Covid: asymptomatic , negative. CONTACT PRECAUTIONS from MRSA    Additional Information:     RN: Natalie Brewer RN   4/21/2024 8:11 PM

## 2024-04-22 NOTE — PHARMACY-CONSULT NOTE
Pharmacy Consult to evaluate for medication related stroke core measures    Ever Crane, 79 year old male admitted for CVA on 4/21/2024.    Thrombolytic was not given because of Clinical contraindications    VTE Prophylaxis  Xarelto given on 4/21/24 as appropriate prior to end of hospital day 2.    Antithrombotic: aspirin and clopidogrel started on 4/21/24, as appropriate by end of hospital day 2. Continue antithrombotic therapy on discharge to meet quality measures, unless contraindicated.    Anticoagulation if history of A-fib/flutter: Patient on rivaroxaban (Xarelto); continue anticoagulation on discharge to meet quality measures, unless contraindicated.    LDL Cholesterol Calculated   Date Value Ref Range Status   04/21/2024 27 <=100 mg/dL Final     LDL Cholesterol Direct   Date Value Ref Range Status   07/30/2015 54 0 - 129 mg/dl Final       Patient's home statin, Zocor (simvastatin) restarted; continue statin on discharge to meet quality measures, unless contraindicated.     Recommendations:  Please evaluate if alternate anticoagulation is needed given new stroke on Xarelto.    Thank you for the consult.    Dolores Chopra [facility] 4/22/2024 8:37 AM

## 2024-04-22 NOTE — CONSULTS
Please see RNCM progress note for consult details today.       Miriam Hernandez RN on 4/22/2024 at 3:30 PM

## 2024-04-22 NOTE — PLAN OF CARE
Problem: Adult Inpatient Plan of Care  Goal: Absence of Hospital-Acquired Illness or Injury  Outcome: Progressing  Intervention: Identify and Manage Fall Risk  Recent Flowsheet Documentation  Taken 4/22/2024 0005 by Aly Long RN  Safety Promotion/Fall Prevention:   clutter free environment maintained   patient and family education   increased rounding and observation  Taken 4/21/2024 2106 by Aly Long RN  Safety Promotion/Fall Prevention:   clutter free environment maintained   patient and family education   increased rounding and observation  Intervention: Prevent Skin Injury  Recent Flowsheet Documentation  Taken 4/22/2024 0512 by Aly Long RN  Body Position: position changed independently  Taken 4/22/2024 0300 by Aly Long RN  Body Position: position changed independently  Taken 4/22/2024 0005 by Aly Long RN  Body Position: position changed independently  Taken 4/21/2024 2106 by Aly Long RN  Body Position: position changed independently  Goal: Optimal Comfort and Wellbeing  Outcome: Progressing   Goal Outcome Evaluation:      Patient alert and oriented x4, VSS. On Tele A flutter. NIH score 1. No complain of pain. Bilateral foot/heel wounds clean, and dressing applied. Patient slept well. No overnight concern.

## 2024-04-22 NOTE — H&P
"[facility]    History and Physical - Hospitalist Service       Date of Admission:  4/21/2024    Assessment & Plan      Ever Crane is a 79 year old male with PMH A-fib, MESHA, type 2 diabetes, HTN, MRSA infection, CHF, PAD, HLD, bilateral heel pressure ulcers, and NSTEMI s/p CABG admitted on 4/21/2024 after arriving to the ED with complaints of right-sided weakness x 4 days. Positive for CVA    CVA  Brain MRI reveals small acute/early subacute infarction along the posterior lateral aspect of left basal ganglia  Add aspirin per neurology (pt was on Plavix and Xarelto)  Neurochecks every 4 hours  PT/OT/speech eval  Swallow eval  Echo    A flutter  Cardiac monitor  PTA Xarelto    Acute left conjunctivitis  Polymixin b-trimethoprim drops    Bilateral heel wounds  WOC consult    Type 2 diabetes  Inpatient sliding scale insulin  PTA gabapentin    HLD  PTA simvastatin    CHF  PTA furosemide, spironolactone    BPH  PTA tamsulosin    HTN  PTA losartan     Diet: Combination Diet Moderate Consistent Carb (60 g CHO per Meal) Diet    DVT Prophylaxis: DOAC and Pneumatic Compression Devices  Dahl Catheter: Not present  Lines: PRESENT             Cardiac Monitoring: ACTIVE order. Indication: Stroke, acute (48 hours)  Code Status: Full Code      Clinically Significant Risk Factors Present on Admission               # Drug Induced Coagulation Defect: home medication list includes an anticoagulant medication  # Drug Induced Platelet Defect: home medication list includes an antiplatelet medication   # Hypertension: Noted on problem list   # Dementia: noted on problem list    # Overweight: Estimated body mass index is 26.96 kg/m  as calculated from the following:    Height as of 4/17/24: 1.778 m (5' 10\").    Weight as of 4/17/24: 85.2 kg (187 lb 14.4 oz).        # History of CABG: noted on surgical history       Disposition Plan     Medically Ready for Discharge: Anticipated Tomorrow         The patient's care " was discussed with the Attending Physician, Dr. Devi and Patient.    Sandra Reyes NP  Hospitalist Service  [facility]  Securely message with [employer] (more info)  Text page via [employer] Paging/Directory     ______________________________________________________________________    Chief Complaint       History of Present Illness   Ever Crane is a 79 year old male who resides in a nursing home.  He came to the ED after 4 days of gradual weakness.  He reports he noticed he was unable to write and felt weaker while ambulating with his walker.  His speech is clear, without facial droop.  He has a right arm and leg drift.  Pupils equal and reactive.  Denies blurry or double vision.  He has numbness to his feet bilaterally 2/2 PAD and diabetes.  He denies any other associated symptoms.  He is currently in a flutter, rate controlled.      Past Medical History    Past Medical History:   Diagnosis Date    A-fib (H)     Acute diastolic heart failure (H) 06/07/2022    Acute posthemorrhagic anemia 05/17/2022    MESHA (acute kidney injury) (H24)     Anemia     Atopic keratoconjunctivitis     Atrial fibrillation (H)     Brian Moe: 8/2011 Cardioversion; CHADS2 VASC = 5; he is on warfarin and sotalol     Atrial flutter (H)     Mcgarry's esophagus 01/01/2012    per note of Dr. Tavo Mike of [facility]    Bilateral lower leg cellulitis 08/31/2018    BPH (benign prostatic hyperplasia)     Candidiasis of perineum 01/03/2018    Carotid stenosis     Carotid stenosis, asymptomatic, right     Cellulitis     CHF (congestive heart failure) (H)     Cholecystitis 10/14/2019    Cholecystitis, acute 08/18/2019    Chronic systolic heart failure (H)     Chronic venous stasis dermatitis     Closed nondisplaced intertrochanteric fracture of left femur with routine healing, subsequent encounter 05/18/2022    Clostridium difficile colitis     Contact with and (suspected) exposure to covid-19 12/13/2021    COPD (chronic  obstructive pulmonary disease) (H)     Coronary artery disease due to lipid rich plaque 2000    CABG x2    Coronary atherosclerosis     Diabetes (H)     Diabetic ulcer of both feet (H) 10/31/2017    Diabetic ulcer of toe of right foot associated with diabetes mellitus due to underlying condition, limited to breakdown of skin (H) 01/05/2023    Diabetic ulcer of toe of right foot associated with diabetes mellitus due to underlying condition, with necrosis of bone (H) 01/05/2023    Dyslexia     Dyslipidemia, goal LDL below 70 2000    Epistaxis     Essential hypertension     Gangrene of left foot (H)     GERD (gastroesophageal reflux disease)     HLD (hyperlipidemia)     HTN (hypertension)     Hyponatremia     Ischemic heart disease     Lymphedema     Mild cognitive impairment     MRSA (methicillin resistant Staphylococcus aureus)     Neuropathy     Non-STEMI (non-ST elevated myocardial infarction) (H)     Occlusion and stenosis of right carotid artery     Osteoarthrosis     Osteomyelitis of ankle and foot (H)     Other atopic dermatitis     PAD (peripheral artery disease) (H24)     Peripheral vascular disease (H24)     Pneumonia 06/26/2018    Polyneuropathy due to type 2 diabetes mellitus (H)     Pressure ulcer, heel     Bilateral    Renal insufficiency     Type 2 diabetes mellitus, without long-term current use of insulin (H)     Ulcer of right foot (H)     Unable to function independently 11/13/2017       Past Surgical History   Past Surgical History:   Procedure Laterality Date    AMPUTATE FOOT Left 11/05/2017    Procedure: LEFT TRANSMETATARSAL AMPUTATION;  Surgeon: Ever Wick MD;  [place] [facility] [place]  Service:     AMPUTATE FOOT Right 4/27/2023    Procedure: Transmetatarsal amputation right foot;  Surgeon: Miguelangel Spears DPM;  [place] [facility] [place]    AMPUTATE FOOT Right 5/15/2023    Procedure: partial calcanectomy;  Surgeon: Miguelangel Spears DPM;  Location: [facility] OR     BYPASS GRAFT ARTERY CORONARY N/A 03/06/2000    SVG to OM1, SVG to PDA    BYPASS GRAFT ARTERY CORONARY N/A 10/04/2018    redo CABG due to graft failure    CABG MEASURES GRP      CARDIOVERSION  08/25/2011    CV CORONARY ANGIOGRAM N/A 10/01/2018    Procedure: Coronary Angiogram;  Surgeon: Miki Mac MD;  Location: [facility];  Service:     INCISION AND DRAINAGE FOOT, COMBINED Right 5/15/2023    Procedure: INCISION AND DRAINAGE, right heel with,;  Surgeon: Miguelangel Spears DPM;  Location: [facility] [place]    INCISION AND DRAINAGE FOOT, COMBINED Bilateral 6/1/2023    Procedure: INCISION AND DRAINAGE, right foot with debridement of ulceration bilateral heels;  Surgeon: Miguelangel Spears DPM;  Location: [facility] [place]    INGUINAL HERNIA REPAIR Bilateral 1969    and 1979    IR EXTREMITY ANGIOGRAM BILATERAL  11/3/2017    IR LOWER EXTREMITY ANGIOGRAM LEFT  3/10/2023    IR LOWER EXTREMITY ANGIOGRAM RIGHT  1/24/2023    LAPAROSCOPIC CHOLECYSTECTOMY N/A 08/18/2019    Procedure: CHOLECYSTECTOMY, LAPAROSCOPIC;  Surgeon: Stewart Fountain MD;  Location: [facility] OR;  Service: General    LENGTHEN TENDON ACHILLES Right 4/27/2023    Procedure: with Achilles tendon lengthening, debridement of right heel ulceration.;  Surgeon: Miguelangel Spears DPM;  [place] [facility] [place]       Prior to Admission Medications   Prior to Admission Medications   Prescriptions Last Dose Informant Patient Reported? Taking?   acetaminophen (TYLENOL) 325 MG tablet Past Month at prn  Yes Yes   Sig: Take 650 mg by mouth 2 times daily as needed for mild pain   acetaminophen (TYLENOL) 500 MG tablet 4/21/2024 at am  Yes Yes   Sig: Take 1,000 mg by mouth 2 times daily   albuterol (PROAIR HFA/PROVENTIL HFA/VENTOLIN HFA) 108 (90 Base) MCG/ACT inhaler Unknown at prn  Yes Yes   Sig: Inhale 2 puffs into the lungs 2 times daily as needed   cetirizine (ZYRTEC) 5 MG tablet 4/20/2024 at pm  Yes Yes   Sig: Take 5 mg by mouth  daily   clopidogrel (PLAVIX) 75 MG tablet 4/21/2024 at am  Yes Yes   Sig: Take 75 mg by mouth daily   dulaglutide (TRULICITY) 0.75 MG/0.5ML pen Past Week at Tuesdays  Yes Yes   Sig: Inject 1.5 mg Subcutaneous every 7 days   furosemide (LASIX) 40 MG tablet 4/21/2024 at am  Yes Yes   Sig: Take 40 mg by mouth daily   gabapentin (NEURONTIN) 100 MG capsule 4/21/2024 at am  Yes Yes   Sig: Take 200 mg by mouth 2 times daily   glimepiride (AMARYL) 1 MG tablet 4/21/2024 at am  Yes Yes   Sig: Take 1 mg by mouth every morning (before breakfast)   hypromellose (ARTIFICIAL TEARS) 0.5 % SOLN ophthalmic solution Past Month at prn  Yes Yes   Sig: Place 1 drop Into the left eye 4 times daily as needed for dry eyes   ketoconazole (NIZORAL) 2 % external shampoo Past Week  Yes Yes   Sig: Apply topically twice a week Mon and Fri.   losartan (COZAAR) 25 MG tablet 4/21/2024 at am  Yes Yes   Sig: Take 12.5 mg by mouth daily   naloxone (NARCAN) 0.4 MG/ML injection Unknown at prn  Yes Yes   Sig: Inject 0.1-0.4 mg into the muscle once as needed for opioid reversal   omeprazole (PRILOSEC) 20 MG CR capsule 4/21/2024 at am  Yes Yes   Sig: Take 20 mg by mouth daily   rivaroxaban ANTICOAGULANT (XARELTO) 15 MG TABS tablet 4/20/2024 at pm  Yes Yes   Sig: Take 20 mg by mouth daily   simvastatin (ZOCOR) 40 MG tablet 4/20/2024 at hs  Yes Yes   Sig: Take 40 mg by mouth At Bedtime   spironolactone (ALDACTONE) 25 MG tablet 4/21/2024 at am  Yes Yes   Sig: Take 25 mg by mouth daily   tamsulosin (FLOMAX) 0.4 MG capsule 4/21/2024 at am  Yes Yes   Sig: Take 0.4 mg by mouth daily      Facility-Administered Medications: None        Review of Systems    CONSTITUTIONAL: NEGATIVE for fever, chills, change in weight  EYES: discharge left and redness left  RESP: NEGATIVE for significant cough or SOB  CV: NEGATIVE for chest pain, palpitations or peripheral edema  GI: NEGATIVE for nausea, abdominal pain, heartburn, or change in bowel habits  NEURO: POSITIVE for  weakness right sided.     Physical Exam   Vital Signs: Temp: 98.1  F (36.7  C) Temp src: Oral BP: 133/72 Pulse: 73   Resp: 19 SpO2: 98 % O2 Device: None (Room air)    Weight: 0 lbs 0 oz    Constitutional: awake, alert, cooperative, no apparent distress, and appears stated age  Eyes: Left eye conjuctivitis  Respiratory: No increased work of breathing, good air exchange, clear to auscultation bilaterally, no crackles or wheezing  Cardiovascular: regularly irregular rhythm  GI: No scars, normal bowel sounds, soft, non-distended, non-tender, no masses palpated, no hepatosplenomegally  Neurologic: Motor Exam:  right arm and leg drifts  Sensory:  Touch:  Right Lower Extremity:  abnormal - numbness  Left Lower Extremity:  abnormal - numbness    Medical Decision Making       50 MINUTES SPENT BY ME on the date of service doing chart review, history, exam, documentation & further activities per the note.  MANAGEMENT DISCUSSED with the following over the past 24 hours: Dr. singletary   NOTE(S)/MEDICAL RECORDS REVIEWED over the past 24 hours: Dr. Devi       Data     I have personally reviewed the following data over the past 24 hrs:    8.2  \   12.1 (L)   / 188     137 103 37.3 (H) /  109 (H)   4.6 21 (L) 1.43 (H) \     ALT: 10 AST: 20 AP: 97 TBILI: 0.7   ALB: 4.0 TOT PROTEIN: 7.9 LIPASE: 23     Trop: 24 (H) BNP: 169     INR:  1.61 (H) PTT:  38   D-dimer:  N/A Fibrinogen:  N/A       Imaging results reviewed over the past 24 hrs:   Recent Results (from the past 24 hour(s))   MR Brain w/o & w Contrast    Narrative    EXAM: MR BRAIN W/O and W CONTRAST  LOCATION: [facility]  DATE: 4/21/2024    INDICATION: right upper and lower extremity weakness onset 4 days ago  COMPARISON: CT head 10/05/2018  CONTRAST: 9ml gadavist  TECHNIQUE: Routine multiplanar multisequence head MRI without and with intravenous contrast.    FINDINGS:  INTRACRANIAL CONTENTS: Small acute/early subacute infarction along the posterior lateral  aspect of left basal ganglia. No definite associated hemorrhage. No significant mass effect. No mass, acute hemorrhage, or extra-axial fluid collections. Scattered   nonspecific T2/FLAIR hyperintensities within the cerebral white matter most consistent with mild chronic microvascular ischemic change. Moderate generalized cerebral atrophy. No hydrocephalus. Diffuse atrophy of corpus callosum. Normal position of the   cerebellar tonsils. No pathologic contrast enhancement.    SELLA: No abnormality accounting for technique.    OSSEOUS STRUCTURES/SOFT TISSUES: Normal marrow signal. The major intracranial vascular flow voids are maintained.     ORBITS: No abnormality accounting for technique.     SINUSES/MASTOIDS: Moderate to marked mucosal thickening paranasal sinuses. Opacification right mastoid air cells.       Impression    IMPRESSION:  1.  Small acute/early subacute infarction along the posterior lateral aspect of left basal ganglia.  2.  No definite associated hemorrhage.  3.  No significant mass effect.  4.  Mild chronic small vessel ischemia.  5.  Moderate atrophy.   CT Chest/Abdomen/Pelvis w Contrast   Result Value    Radiologist flags Pancreatic cyst, lung nodule    Narrative    EXAM: CT CHEST/ABDOMEN/PELVIS W CONTRAST  LOCATION: [facility]  DATE: 4/21/2024    INDICATION: feels weak, intermittent left sided abdominal pain, heart failure hx  COMPARISON: None.  TECHNIQUE: CT scan of the chest, abdomen, and pelvis was performed following injection of IV contrast. Multiplanar reformats were obtained. Dose reduction techniques were used.   CONTRAST: 75ml isovue 370    FINDINGS:   LUNGS AND PLEURA: Scattered airway secretions and bronchiectasis. Mild emphysema. Subpleural atelectasis in/or scarring are seen within both lung bases. 5 mm right upper lobe nodule (series 4 image 132). No pleural effusion or pneumothorax.    MEDIASTINUM/AXILLAE: No pathologically enlarged thoracic lymph nodes.  Unremarkable thyroid. Normal caliber thoracic aorta. Normal heart size. No pericardial effusion.    CORONARY ARTERY CALCIFICATION: Severe.    HEPATOBILIARY: Subcentimeter hypoattenuating lesion/lesions are too small to characterize. No biliary dilation. Cholecystectomy.     PANCREAS: 2.1 cm hypoattenuating lesion within the pancreatic head/uncinate (series 3 image 172).      SPLEEN: Normal.     ADRENAL GLANDS: Normal.     KIDNEYS/BLADDER: Benign renal cysts and/or probable cysts warrant no further follow up. Nonobstructing left nephrolithiasis. Normal urinary bladder.      BOWEL: No obstruction. Normal appendix. No bowel wall thickening or pneumatosis. No pneumoperitoneum or free fluid.     LYMPH NODES: No pathologically enlarged lymph nodes.    VASCULATURE: Nonaneurysmal aorta with severe aortoiliac calcifications.     PELVIC ORGANS: Prostatomegaly.     MUSCULOSKELETAL: Multilevel degenerative disc disease of the thoracolumbar spine. Left proximal femur hardware.       Impression    IMPRESSION:  1.  No acute abnormalities in the chest, abdomen, or pelvis.  2.  2.1 cm cystic lesion in the pancreatic head/uncinate. Recommend nonemergent/outpatient MRI/MRCP for further evaluation.  3.  5 mm right upper lobe nodule. Correlate with risk factors and consider follow-up chest CT in 12 months.  4.  Chronic and ancillary findings as described.    [Consider Follow Up: Pancreatic cyst, lung nodule]    This report will be copied to the [facility] to ensure a provider acknowledges the finding.

## 2024-04-22 NOTE — PROGRESS NOTES
"Care Management Follow Up    Length of Stay (days): 0    Expected Discharge Date: 04/22/2024     Concerns to be Addressed: discharge planning     Patient plan of care discussed at interdisciplinary rounds: Yes    Anticipated Discharge Disposition:  TCU      Anticipated Discharge Services:    Education Provided on the Discharge Plan:  Per Care Team   Patient/Family in Agreement with the Plan:      Referrals Placed by CM/SW:    Private pay costs discussed: Not applicable    Additional Information:  Chart reviewed    Cm updates:  [facility] at discharge.       Echo pending.       Social hx:  \"Pt was at [facility] prior to admission. Pt has MA and has an automatic 18 days bed hold. Pt wants to return to [facility].  [facility] transport.\"      Cm will continue to follow plan of care,review recommendations, and assist with any discharge needs anticipated.     Miriam Hernandez RN      "

## 2024-04-22 NOTE — UTILIZATION REVIEW
Admission Status; Secondary Review Determination   Under the authority of the Utilization Management Committee, the utilization review process indicated a secondary review on Ever Crane. The review outcome is based on review of the medical records, discussions with staff, and applying clinical experience noted on the date of the review.   (x) Inpatient Status Appropriate - This patient's medical care is consistent with medical management for inpatient care and reasonable inpatient medical practice.     RATIONALE FOR DETERMINATION   Ever Crane is a 79 yr old male with DM2, HTN, Afib, CHF, PAD, CAD s/p NSTEMI and CABG who presented with right-side upper and lower extremity weakness.  Noted acute/subacute CVA.  Ongoing weakness.  Neuro checks, evaluation for acute CVA with ECHO, monitoring.  Neurology consultation pending.  Therapy assessments for disposition assistance as well.     At the time of admission with the information available to the attending physician more than 2 nights Hospital complex care was anticipated, based on patient risk of adverse outcome if treated as outpatient and complex care required. Inpatient admission is appropriate based on the Medicare guidelines.   The information on this document is developed by the utilization review team in order for the business office to ensure compliance. This only denotes the appropriateness of proper admission status and does not reflect the quality of care rendered.   The definitions of Inpatient Status and Observation Status used in making the determination above are those provided in the CMS Coverage Manual, Chapter 1 and Chapter 6, section 70.4.   Sincerely,   Ingrid Ash MD  Utilization Review  Physician Advisor  [facility]

## 2024-04-22 NOTE — CONSULTS
"Care Management Initial Consult    General Information  Assessment completed with: Ever Raza  Type of CM/SW Visit: Initial Assessment    Primary Care Provider verified and updated as needed: Yes   Readmission within the last 30 days: no previous admission in last 30 days      Reason for Consult: discharge planning  Advance Care Planning: Advance Care Planning Reviewed: present on chart          Communication Assessment  Patient's communication style: spoken language (English or Bilingual)            Living Environment:   People in home: facility resident     Current living Arrangements: extended care facility  Name of Facility: [facility]   Able to return to prior arrangements: yes       Family/Social Support:  Care provided by: other (see comments), self (TCU staff)  Provides care for: no one, unable/limited ability to care for self  Marital Status:   Facility resident(s)/Staff, Sibling(s), Children          Description of Support System: Supportive, Involved    Support Assessment: Adequate family and caregiver support, Adequate social supports, Patient communicates needs well met    Current Resources:   Patient receiving home care services: No     Community Resources: Transitional Care ([facility])  Equipment currently used at home: glucometer, wheelchair, manual, walker, rolling  Supplies currently used at home: Wound Care Supplies, Compression Stockings, Diabetic Supplies (\"prafo boots\")    Employment/Financial:  Employment Status: retired        Financial Concerns:     Referral to Financial Worker: No       Does the patient's insurance plan have a 3 day qualifying hospital stay waiver?  No      Functional Status:  Prior to admission patient needed assistance:   Dependent ADLs:: Wheelchair-independent, Wheelchair-with assist, Ambulation-walker, Bathing, Dressing, Toileting  Dependent IADLs:: Cleaning, Cooking, Laundry, Shopping, Meal Preparation, Medication Management, " "Transportation       Mental Health Status:          Chemical Dependency Status:                Values/Beliefs:  Spiritual, Cultural Beliefs, Sikh Practices, Values that affect care:                 Additional Information:  Ever lives at [facility]. He gets assistance with some ADLs and all IADLs. He has been needing to \"use the wheelchair lately for all mobility\".    He tells me it is \"TCU where he is staying and he still wants to recover to get to go back home. But still needs too much help\". During business hours need to verify with [place] that it is TCU where he is staying.    Unknown discharge needs at this time, but should be able to return to TCU.      Health transport at discharge.    GARCIA was given and discussed. All questions were answered.    CM to follow for medical progression of care, discharge recommendations, and final discharge plan.    Estefany Lopes RN    "

## 2024-04-22 NOTE — PLAN OF CARE
Goal Outcome Evaluation:                        PRIMARY DIAGNOSIS: GENERALIZED WEAKNESS    OUTPATIENT/OBSERVATION GOALS TO BE MET BEFORE DISCHARGE  1. Orthostatic performed: N/A    2. Tolerating PO medications: Yes    3. Return to near baseline physical activity: No    4. Cleared for discharge by consultants (if involved): No    Discharge Planner Nurse   Safe discharge environment identified: Yes  Barriers to discharge: Yes       Entered by: Vilma Alexander RN 04/22/2024 11:14 AM   He is requiring assistance with transfers. He was able to transfer to the recliner with assist of 2.  Please review provider order for any additional goals.   Nurse to notify provider when observation goals have been met and patient is ready for discharge.

## 2024-04-22 NOTE — PROGRESS NOTES
04/22/24 0700   Appointment Info   Signing Clinician's Name / Credentials (SLP) SUNNI Argueta   Appointment Canceled Reason (SLP) Other (see Cancel Comments row)   Appointment Cancel Comments (SLP) Stroke Pathway orders received and appreciated. Per chart review and nurse interview it was determined that Speech Therapy eval can be deferred d/t no deficit noted in these areas warranting further evaluation.

## 2024-04-22 NOTE — PROGRESS NOTES
04/22/24 0855   Appointment Info   Signing Clinician's Name / Credentials (PT) Dolores Mancuso DPT   Quick Adds   Quick Adds Certification   Living Environment   People in Home facility resident   Current Living Arrangements other (see comments)  (SNF vs TCU? pt noted being there for over a year.)   Home Accessibility wheelchair accessible   Self-Care   Current Activity Tolerance poor   Equipment Currently Used at Home walker, rolling;wheelchair, manual  (4WW)   General Information   Onset of Illness/Injury or Date of Surgery 04/21/24   Referring Physician Sandra Azul NP   Patient/Family Therapy Goals Statement (PT) none   Pertinent History of Current Problem (include personal factors and/or comorbidities that impact the POC) 79 year old male with PMH A-fib, MESHA, type 2 diabetes, HTN, MRSA infection, CHF, PAD, HLD, bilateral heel pressure ulcers, and NSTEMI s/p CABG admitted on 4/21/2024 after arriving to the ED with complaints of right-sided weakness x 4 days. Positive for CVA   Existing Precautions/Restrictions fall   General Observations Pt noted B heel wounds, but has been WBAT with his 4WW at facility.   Strength (Manual Muscle Testing)   Strength (Manual Muscle Testing) Deficits observed during functional mobility   Strength Comments R LE grossly 4+/5 during MMT.   Bed Mobility   Bed Mobility supine-sit   Supine-Sit [place] (Bed Mobility) minimum assist (75% patient effort);2 person assist   Bed Mobility Limitations decreased ability to use arms for pushing/pulling;decreased ability to use legs for bridging/pushing;impaired ability to control trunk for mobility   Assistive Device (Bed Mobility) bed rails   Transfers   Transfers sit-stand transfer   Sit-Stand Transfer   Sit-Stand [place] (Transfers) moderate assist (50% patient effort);2 person assist   Assistive Device (Sit-Stand Transfers) walker, front-wheeled   Gait/Stairs (Locomotion)   Comment, (Gait/Stairs) n/a- pt unable to ambulate  functional distance due to weakness.   Clinical Impression   Criteria for Skilled Therapeutic Intervention Yes, treatment indicated   PT Diagnosis (PT) Impaired functional mobility   Influenced by the following impairments Weakness   Functional limitations due to impairments Impaired strength, transfers, gait   Clinical Presentation (PT Evaluation Complexity) evolving   Clinical Presentation Rationale Presents as diagnosed   Clinical Decision Making (Complexity) moderate complexity   Planned Therapy Interventions (PT) balance training;bed mobility training;gait training;home exercise program;strengthening;wheelchair management/propulsion training;transfer training   Risk & Benefits of therapy have been explained patient   PT Total Evaluation Time   PT Eval, Moderate Complexity Minutes (53847) 10   Therapy Certification   Start of care date 04/22/24   Certification date from 04/22/24   Certification date to 04/29/24   Medical Diagnosis CVA   Physical Therapy Goals   PT Frequency 4x/week   PT Predicted Duration/Target Date for Goal Attainment 04/29/24   PT Goals Bed Mobility;Transfers;Gait   PT: Bed Mobility Supervision/stand-by assist;Supine to/from sit   PT: Transfers Minimal assist;Sit to/from stand;Bed to/from chair;Assistive device   PT: Gait Minimal assist;Rolling walker;25 feet   PT Discharge Planning   PT Plan progress transfers, amb as cleo, LE ex   PT Discharge Recommendation (DC Rec) Transitional Care Facility  (vs SNF with home PT?)   PT Rationale for DC Rec Ax2 with transfers today.   PT Brief overview of current status Bed > recliner, min Ax2 with FWW. Additional sit <> stands, mod Ax2.   PT Equipment Needed at Discharge walker, rolling;wheelchair   Total Session Time   Total Session Time (sum of timed and untimed services) 10         [facility]  OUTPATIENT PHYSICAL THERAPY EVALUATION  PLAN OF TREATMENT FOR OUTPATIENT REHABILITATION  (COMPLETE FOR INITIAL CLAIMS  ONLY)  Patient's Last Name, First Name, M.I.  YOB: 1945  ShawnEver horner                        Provider's Name  [facility] Medical Record No.  [id number]                             Onset Date:  04/21/24   Start of Care Date:  (P) 04/22/24   Type:     _X_PT   ___OT   ___SLP Medical Diagnosis:  (P) CVA              PT Diagnosis:  Impaired functional mobility Visits from SOC:  1     See note for plan of treatment, functional goals and certification details    I CERTIFY THE NEED FOR THESE SERVICES FURNISHED UNDER        THIS PLAN OF TREATMENT AND WHILE UNDER MY CARE     (Physician co-signature of this document indicates review and certification of the therapy plan).                Dolores Mancuso, PT, DPT  4/22/2024

## 2024-04-23 ENCOUNTER — APPOINTMENT (OUTPATIENT)
Dept: ULTRASOUND IMAGING | Facility: HOSPITAL | Age: 79
DRG: 065 | End: 2024-04-23
Attending: PSYCHIATRY & NEUROLOGY
Payer: MEDICARE

## 2024-04-23 VITALS
TEMPERATURE: 97.3 F | SYSTOLIC BLOOD PRESSURE: 119 MMHG | DIASTOLIC BLOOD PRESSURE: 71 MMHG | RESPIRATION RATE: 17 BRPM | OXYGEN SATURATION: 95 % | HEART RATE: 69 BPM

## 2024-04-23 LAB
GLUCOSE BLDC GLUCOMTR-MCNC: 104 MG/DL (ref 70–99)
GLUCOSE BLDC GLUCOMTR-MCNC: 148 MG/DL (ref 70–99)

## 2024-04-23 PROCEDURE — 99239 HOSP IP/OBS DSCHRG MGMT >30: CPT | Performed by: STUDENT IN AN ORGANIZED HEALTH CARE EDUCATION/TRAINING PROGRAM

## 2024-04-23 PROCEDURE — 250N000013 HC RX MED GY IP 250 OP 250 PS 637

## 2024-04-23 PROCEDURE — 93880 EXTRACRANIAL BILAT STUDY: CPT

## 2024-04-23 PROCEDURE — 99232 SBSQ HOSP IP/OBS MODERATE 35: CPT | Performed by: PSYCHIATRY & NEUROLOGY

## 2024-04-23 PROCEDURE — 250N000013 HC RX MED GY IP 250 OP 250 PS 637: Performed by: INTERNAL MEDICINE

## 2024-04-23 RX ORDER — POLYMYXIN B SULFATE AND TRIMETHOPRIM 1; 10000 MG/ML; [USP'U]/ML
2 SOLUTION OPHTHALMIC 4 TIMES DAILY
DISCHARGE
Start: 2024-04-23 | End: 2024-04-27

## 2024-04-23 RX ORDER — ASPIRIN 81 MG/1
81 TABLET ORAL DAILY
DISCHARGE
Start: 2024-04-24 | End: 2024-05-15

## 2024-04-23 RX ADMIN — TAMSULOSIN HYDROCHLORIDE 0.4 MG: 0.4 CAPSULE ORAL at 08:52

## 2024-04-23 RX ADMIN — GABAPENTIN 200 MG: 100 CAPSULE ORAL at 08:51

## 2024-04-23 RX ADMIN — FUROSEMIDE 40 MG: 20 TABLET ORAL at 08:52

## 2024-04-23 RX ADMIN — CLOPIDOGREL BISULFATE 75 MG: 75 TABLET ORAL at 08:49

## 2024-04-23 RX ADMIN — POLYMYXIN B SULFATE AND TRIMETHOPRIM 2 DROP: 10000; 1 SOLUTION OPHTHALMIC at 08:51

## 2024-04-23 RX ADMIN — ACETAMINOPHEN 1000 MG: 500 TABLET ORAL at 08:52

## 2024-04-23 RX ADMIN — CETIRIZINE HYDROCHLORIDE 5 MG: 5 TABLET ORAL at 08:52

## 2024-04-23 RX ADMIN — PANTOPRAZOLE SODIUM 40 MG: 40 TABLET, DELAYED RELEASE ORAL at 06:44

## 2024-04-23 RX ADMIN — ASPIRIN 81 MG CHEWABLE TABLET 81 MG: 81 TABLET CHEWABLE at 08:51

## 2024-04-23 RX ADMIN — LOSARTAN POTASSIUM 12.5 MG: 25 TABLET, FILM COATED ORAL at 08:52

## 2024-04-23 RX ADMIN — INSULIN ASPART 1 UNITS: 100 INJECTION, SOLUTION INTRAVENOUS; SUBCUTANEOUS at 08:49

## 2024-04-23 RX ADMIN — SPIRONOLACTONE 25 MG: 25 TABLET, FILM COATED ORAL at 08:52

## 2024-04-23 RX ADMIN — POLYMYXIN B SULFATE AND TRIMETHOPRIM 2 DROP: 10000; 1 SOLUTION OPHTHALMIC at 13:23

## 2024-04-23 RX ADMIN — GLIMEPIRIDE 1 MG: 1 TABLET ORAL at 06:44

## 2024-04-23 ASSESSMENT — ACTIVITIES OF DAILY LIVING (ADL)
ADLS_ACUITY_SCORE: 31
ADLS_ACUITY_SCORE: 29
ADLS_ACUITY_SCORE: 31
ADLS_ACUITY_SCORE: 29
ADLS_ACUITY_SCORE: 31
ADLS_ACUITY_SCORE: 29
ADLS_ACUITY_SCORE: 31
ADLS_ACUITY_SCORE: 29
ADLS_ACUITY_SCORE: 31

## 2024-04-23 NOTE — PROGRESS NOTES
NEUROLOGY PROGRESS NOTE     Ever Crane,  1945, MRN [id number] Date: 2024     [facility]   Code status:  Full Code   PCP: Adriano Dodd Physician, None      ASSESSMENT & PLAN   Diagnosis code: Stroke    Stroke  History of diabetes/hypertension  History of atrial fibrillation    MRI brain with acute stroke in the posterior lateral aspect of the left basal ganglia  Carotid ultrasound shows 70% right asymptomatic carotid stenosis.  No stenosis on the left.  Echocardiogram shows 50 to 55% ejection fraction  Patient was on Xarelto and Plavix at home.  Will add aspirin for 3 more weeks.  Lipid panel and statin.  LDL goal less than 70.  LDL 27 on 40 mg of Zocor.  Blood pressure can be normalized  PT/OT    Will sign off.  Please call with any further questions.     Chief Complaint   Patient presents with    Generalized Weakness        HISTORY OF PRESENT ILLNESS     We have been requested by Dr. Devi to evaluate Ever Crane who is a 79 year old  male for stroke.  This is a 17-year-old male with history of atrial fibrillation, heart failure, acute kidney injury, carotid stenosis, diabetes, hypertension, below the ankle amputation bilaterally scented with a 4-day history of left-sided weakness.  Patient has been unable to write and has had difficulty with ambulation with his walker.  His speech has been clear without any facial droop.  Denies any provoking factors.  Feels strong on the right side.  Does have numbness in the feet related to diabetes and peripheral arterial disease and possibly neuropathy.  Symptoms have not improved since he has been in the hospital.      Slight improvement in weakness today.  Discussed prognosis of stroke.  Recommend physical therapy.  He is having difficulty grasping things with his left hand.  No other new symptoms.     PAST MEDICAL & SURGICAL HISTORY     Medical History  Past Medical History:   Diagnosis Date    A-fib (H)     Acute diastolic heart failure (H)  06/07/2022    Acute posthemorrhagic anemia 05/17/2022    MESHA (acute kidney injury) (H24)     Anemia     Atopic keratoconjunctivitis     Atrial fibrillation (H)     Brian Moe: 8/2011 Cardioversion; CHADS2 VASC = 5; he is on warfarin and sotalol     Atrial flutter (H)     Mcgarry's esophagus 01/01/2012    per note of Dr. Tavo Mike of [facility]    Bilateral lower leg cellulitis 08/31/2018    BPH (benign prostatic hyperplasia)     Candidiasis of perineum 01/03/2018    Carotid stenosis     Carotid stenosis, asymptomatic, right     Cellulitis     CHF (congestive heart failure) (H)     Cholecystitis 10/14/2019    Cholecystitis, acute 08/18/2019    Chronic systolic heart failure (H)     Chronic venous stasis dermatitis     Closed nondisplaced intertrochanteric fracture of left femur with routine healing, subsequent encounter 05/18/2022    Clostridium difficile colitis     Contact with and (suspected) exposure to covid-19 12/13/2021    COPD (chronic obstructive pulmonary disease) (H)     Coronary artery disease due to lipid rich plaque 2000    CABG x2    Coronary atherosclerosis     Diabetes (H)     Diabetic ulcer of both feet (H) 10/31/2017    Diabetic ulcer of toe of right foot associated with diabetes mellitus due to underlying condition, limited to breakdown of skin (H) 01/05/2023    Diabetic ulcer of toe of right foot associated with diabetes mellitus due to underlying condition, with necrosis of bone (H) 01/05/2023    Dyslexia     Dyslipidemia, goal LDL below 70 2000    Epistaxis     Essential hypertension     Gangrene of left foot (H)     GERD (gastroesophageal reflux disease)     HLD (hyperlipidemia)     HTN (hypertension)     Hyponatremia     Ischemic heart disease     Lymphedema     Mild cognitive impairment     MRSA (methicillin resistant Staphylococcus aureus)     Neuropathy     Non-STEMI (non-ST elevated myocardial infarction) (H)     Occlusion and stenosis of right carotid artery     Osteoarthrosis      Osteomyelitis of ankle and foot (H)     Other atopic dermatitis     PAD (peripheral artery disease) (H24)     Peripheral vascular disease (H24)     Pneumonia 06/26/2018    Polyneuropathy due to type 2 diabetes mellitus (H)     Pressure ulcer, heel     Bilateral    Renal insufficiency     Type 2 diabetes mellitus, without long-term current use of insulin (H)     Ulcer of right foot (H)     Unable to function independently 11/13/2017     Surgical History  Past Surgical History:   Procedure Laterality Date    AMPUTATE FOOT Left 11/05/2017    Procedure: LEFT TRANSMETATARSAL AMPUTATION;  Surgeon: Ever Wick MD;  Location: [facility] [place];  Service:     AMPUTATE FOOT Right 4/27/2023    Procedure: Transmetatarsal amputation right foot;  Surgeon: Miguelangel Spears DPM;  [place] [facility] [place]    AMPUTATE FOOT Right 5/15/2023    Procedure: partial calcanectomy;  Surgeon: Miguelangel Spears DPM;  [place] [facility] [place]    BYPASS GRAFT ARTERY CORONARY N/A 03/06/2000    SVG to OM1, SVG to PDA    BYPASS GRAFT ARTERY CORONARY N/A 10/04/2018    redo CABG due to graft failure    CABG MEASURES GRP      CARDIOVERSION  08/25/2011    CV CORONARY ANGIOGRAM N/A 10/01/2018    Procedure: Coronary Angiogram;  Surgeon: Miki Mac MD;  Location: [facility];  Service:     INCISION AND DRAINAGE FOOT, COMBINED Right 5/15/2023    Procedure: INCISION AND DRAINAGE, right heel with,;  Surgeon: Miguelangel Spears DPM;  [place] [facility] [place]    INCISION AND DRAINAGE FOOT, COMBINED Bilateral 6/1/2023    Procedure: INCISION AND DRAINAGE, right foot with debridement of ulceration bilateral heels;  Surgeon: Miguelangel Spears DPM;  [place] [facility] [place]    INGUINAL HERNIA REPAIR Bilateral 1969    and 1979    IR EXTREMITY ANGIOGRAM BILATERAL  11/3/2017    IR LOWER EXTREMITY ANGIOGRAM LEFT  3/10/2023    IR LOWER EXTREMITY ANGIOGRAM RIGHT  1/24/2023    LAPAROSCOPIC CHOLECYSTECTOMY N/A 08/18/2019     Procedure: CHOLECYSTECTOMY, LAPAROSCOPIC;  Surgeon: Stewart Fountain MD;  Location: [facility] OR;  Service: General    LENGTHEN TENDON ACHILLES Right 2023    Procedure: with Achilles tendon lengthening, debridement of right heel ulceration.;  Surgeon: Miguelangel Spears DPM;  Location: [facility]        SOCIAL HISTORY     Social History     Tobacco Use    Smoking status: Former     Current packs/day: 0.00     Average packs/day: 1 pack/day for 36.0 years (36.0 ttl pk-yrs)     Types: Cigarettes     Start date: 1964     Quit date: 2000     Years since quittin.9    Smokeless tobacco: Never   Substance Use Topics    Alcohol use: Not Currently     Alcohol/week: 5.0 standard drinks of alcohol     Types: 1 Cans of beer, 4 Standard drinks or equivalent per week    Drug use: No        FAMILY HISTORY     Reviewed, and family history includes CABG in his father; Esophageal Cancer (age of onset: 58.00) in his father; No Known Problems in his grandchild, grandchild, sister, sister, and son; Obesity in his sister; Osteoarthritis in his sister; Sudden Death (age of onset: 85.00) in his mother; Valvular heart disease in his father.     ALLERGIES     Allergies   Allergen Reactions    Lanolin      Other Reaction(s): Not available    Cranberry Extract Itching and Rash    Doxycycline Rash    Latex Rash    Penicillin V Rash     Reaction occurred as a child. Patient tolerated Zosyn 2018, Cefazolin 10/2018, and has also tolerated Augmentin.    Penicillins Rash     Reaction occurred as a child. Patient tolerated Zosyn 2018, Cefazolin 10/2018, and has also tolerated Augmentin.        REVIEW OF SYSTEMS     12 System ROS was done other than the HPI this was negative.  Pertinent positives noted in the HPI.     HOME & HOSPITAL MEDICATIONS     Prior to Admission Medications  Medications Prior to Admission   Medication Sig Dispense Refill Last Dose    acetaminophen (TYLENOL) 325 MG tablet Take 650 mg by  mouth 2 times daily as needed for mild pain   Past Month at prn    acetaminophen (TYLENOL) 500 MG tablet Take 1,000 mg by mouth 2 times daily   4/21/2024 at am    albuterol (PROAIR HFA/PROVENTIL HFA/VENTOLIN HFA) 108 (90 Base) MCG/ACT inhaler Inhale 2 puffs into the lungs 2 times daily as needed   Unknown at prn    cetirizine (ZYRTEC) 5 MG tablet Take 5 mg by mouth daily   4/20/2024 at pm    clopidogrel (PLAVIX) 75 MG tablet Take 75 mg by mouth daily   4/21/2024 at am    dulaglutide (TRULICITY) 0.75 MG/0.5ML pen Inject 1.5 mg Subcutaneous every 7 days   Past Week at Tuesdays    furosemide (LASIX) 40 MG tablet Take 40 mg by mouth daily   4/21/2024 at am    gabapentin (NEURONTIN) 100 MG capsule Take 200 mg by mouth 2 times daily   4/21/2024 at am    glimepiride (AMARYL) 1 MG tablet Take 1 mg by mouth every morning (before breakfast)   4/21/2024 at am    hypromellose (ARTIFICIAL TEARS) 0.5 % SOLN ophthalmic solution Place 1 drop Into the left eye 4 times daily as needed for dry eyes   Past Month at prn    ketoconazole (NIZORAL) 2 % external shampoo Apply topically twice a week Mon and Fri.   Past Week    losartan (COZAAR) 25 MG tablet Take 12.5 mg by mouth daily   4/21/2024 at am    naloxone (NARCAN) 0.4 MG/ML injection Inject 0.1-0.4 mg into the muscle once as needed for opioid reversal   Unknown at prn    omeprazole (PRILOSEC) 20 MG CR capsule Take 20 mg by mouth daily   4/21/2024 at am    rivaroxaban ANTICOAGULANT (XARELTO) 15 MG TABS tablet Take 20 mg by mouth daily   4/20/2024 at pm    simvastatin (ZOCOR) 40 MG tablet Take 40 mg by mouth At Bedtime   4/20/2024 at hs    spironolactone (ALDACTONE) 25 MG tablet Take 25 mg by mouth daily   4/21/2024 at am    tamsulosin (FLOMAX) 0.4 MG capsule Take 0.4 mg by mouth daily   4/21/2024 at am       Hospital Medications  Current Facility-Administered Medications   Medication Dose Route Frequency Provider Last Rate Last Admin    acetaminophen (TYLENOL) tablet 1,000 mg  1,000  mg Oral BID LaboltSandra NP   1,000 mg at 04/23/24 0852    aspirin EC tablet 81 mg  81 mg Oral Daily LaboltSandra NP        Or    aspirin (ASA) chewable tablet 81 mg  81 mg Oral or NG Tube Daily LaboltSandra, NP   81 mg at 04/23/24 0851    cetirizine (zyrTEC) tablet 5 mg  5 mg Oral Daily LabolSandra jason NP   5 mg at 04/23/24 0852    clopidogrel (PLAVIX) tablet 75 mg  75 mg Oral Daily LaboltSandra NP   75 mg at 04/23/24 0849    furosemide (LASIX) tablet 40 mg  40 mg Oral Daily LabolSandra jason NP   40 mg at 04/23/24 0852    gabapentin (NEURONTIN) capsule 200 mg  200 mg Oral BID LabolSandra jason NP   200 mg at 04/23/24 0851    glimepiride (AMARYL) tablet 1 mg  1 mg Oral QAM AC Chacha Devi MD   1 mg at 04/23/24 0644    insulin aspart (NovoLOG) injection (RAPID ACTING)  1-7 Units Subcutaneous TID AC Sandra Azul NP   1 Units at 04/23/24 0849    insulin aspart (NovoLOG) injection (RAPID ACTING)  1-5 Units Subcutaneous At Bedtime Sandra Azul NP        losartan (COZAAR) half-tab 12.5 mg  12.5 mg Oral Daily LabSandra arceo NP   12.5 mg at 04/23/24 0852    pantoprazole (PROTONIX) EC tablet 40 mg  40 mg Oral QAM AC LabSandra arceo NP   40 mg at 04/23/24 0644    polymixin b-trimethoprim (POLYTRIM) ophthalmic solution 2 drop  2 drop Left Eye 4x Daily LabSandra arceo NP   2 drop at 04/23/24 0851    rivaroxaban ANTICOAGULANT (XARELTO) tablet 20 mg  20 mg Oral Daily with supper LabSandra arceo NP   20 mg at 04/22/24 1717    simvastatin (ZOCOR) tablet 40 mg  40 mg Oral At Bedtime Sandra Azul NP   40 mg at 04/22/24 2138    sodium chloride (PF) 0.9% PF flush 3 mL  3 mL Intracatheter Q8H LabolSandra jason NP   3 mL at 04/23/24 0342    spironolactone (ALDACTONE) tablet 25 mg  25 mg Oral Daily LabSandra arceo NP   25 mg at 04/23/24 0839    tamsulosin (FLOMAX) capsule 0.4 mg  0.4 mg Oral Daily Sandra Azul NP   0.4 mg at 04/23/24 0832         PHYSICAL EXAM     Vital signs  Temp:  [97.3  F (36.3  C)-98.4  F (36.9  C)] 97.3  F (36.3  C)  Pulse:  [69-79] 69  Resp:  [16-19] 17  BP: (119-132)/(54-71) 119/71  SpO2:  [94 %-96 %] 95 %    PHYSICAL EXAMINATION  VITALS: /71 (BP Location: Left arm)   Pulse 69   Temp 97.3  F (36.3  C) (Oral)   Resp 17   SpO2 95%   GENERAL -Health appearing, No apparent distress  EYES- No scleral icterus, no eyelid droop, Pupils - see Neuro section  HEENT - Normocephalic, atraumatic, Hearing grossly intact; Oral mucosa moist and pink in color. External Ears and nose intact.   Neck - supple   PULM - Good spontaneous respiratory effort  CV-poor circulation in the legs.  MSK- Gait - see Neuro section; Strength and tone- see Neuro section; Range of motion grossly intact.  Below the ankle amputation bilaterally.  PSYCH -cooperative    Neurological  Mental status - Patient is awake and grossly oriented to self, place and time. Attention span is normal. Language is fluent and follows commands appropriately.   Cranial nerves - CN II-XII intact. Pupils are reactive and symmetric; EOMI, VFIT, NLF symmetric  Motor -mild weakness in the left arm and leg.  Antigravity strength in the right arm and leg.  Tone - Tone is symmetric bilaterally in upper and lower extremities.  Reflexes - Reflexes are absent throughout.  Sensation - Sensory exam is grossly intact to light touch, pain.  Coordination - Finger to nose is without dysmetria.  Gait and station --able to safely ambulate due to below the ankle amputation bilaterally.  Exam stable.  Weakness slightly better compared to yesterday.     DIAGNOSTIC STUDIES     Pertinent Radiology   MRI brain  IMPRESSION:  1.  Small acute/early subacute infarction along the posterior lateral aspect of left basal ganglia.  2.  No definite associated hemorrhage.  3.  No significant mass effect.  4.  Mild chronic small vessel ischemia.  5.  Moderate atrophy.    ECHO  1. Left ventricular chamber size and  wall thickness are normal. Systolic  function is low normal. The visually estimated left ventricular ejection  fraction is 50-55% with vbrb-sl-oion variation due to the presence of atrial  flutter.  2. Right ventricular chamber size is normal. Systolic function is mildly  reduced.  3. Moderate left atrial enlargement. Mild right atrial enlargement.  4. Negative bubble study suggesting against the presence of significant intra-  or extracardiac shunting.  5. No hemodynamically significant valvular abnormalities.  6. Compared to the prior study dated 6/26/2018, there has been no significant  change.    Carotid US  IMPRESSION:  1.  Right carotid: Moderate plaque formation. Progressive internal carotid velocity elevation, velocities are now consistent with greater than 70% stenosis in the right internal carotid artery.  2.  Left carotid: Mild plaque formation, velocities consistent with less than 50% stenosis in the left internal carotid artery.  3.  Flow within the vertebral arteries is antegrade.      Recent Results (from the past 24 hour(s))   Glucose by meter    Collection Time: 04/22/24  1:15 PM   Result Value Ref Range    GLUCOSE BY METER POCT 138 (H) 70 - 99 mg/dL   Echocardiogram Complete - For age > 60 yrs    Collection Time: 04/22/24  3:25 PM   Result Value Ref Range    LVEF  50-55%    Glucose by meter    Collection Time: 04/22/24  4:43 PM   Result Value Ref Range    GLUCOSE BY METER POCT 118 (H) 70 - 99 mg/dL   Glucose by meter    Collection Time: 04/22/24  9:42 PM   Result Value Ref Range    GLUCOSE BY METER POCT 154 (H) 70 - 99 mg/dL   Glucose by meter    Collection Time: 04/23/24  8:23 AM   Result Value Ref Range    GLUCOSE BY METER POCT 148 (H) 70 - 99 mg/dL       Total time spent for face to face visit, reviewing labs/imaging studies, counseling and coordination of care was: Over 35 min More than 50% of this time was spent on counseling and coordination of care.    Counseling patient.  Reviewing  chart/test results.    Sean Burrows MD  Neurologist  [facility] [place]  Tel:- [phone]

## 2024-04-23 NOTE — PLAN OF CARE
Goal Outcome Evaluation:    Bilateral wound care done on feet. Pt belongings sent with him along with discharge paperwork. WC transport to [facility].

## 2024-04-23 NOTE — PLAN OF CARE
Goal Outcome Evaluation:         Ever denied pain. NIHSS score 0. This morning he sat on the edge of the bed for breakfast. He declined wound care this afternoon. Good appetite. Call light in reach.

## 2024-04-23 NOTE — PLAN OF CARE
Physical Therapy Discharge Summary    Reason for therapy discharge:    Discharged to transitional care facility.    Progress towards therapy goal(s). See goals on Care Plan in [facility] electronic health record for goal details.  Goals not met.  Barriers to achieving goals:   discharge from facility.    Therapy recommendation(s):    Recommend continued therapies to improve overall strength, balance and mobility.       Dolores Mancuso, PT, DPT  4/23/2024

## 2024-04-23 NOTE — PLAN OF CARE
Problem: Stroke, Ischemic (Includes Transient Ischemic Attack)  Goal: Effective Urinary Elimination  Outcome: Progressing     Problem: Risk for Delirium  Goal: Improved Sleep  Outcome: Progressing   Goal Outcome Evaluation:    Patient alert and oriented with VSS on RA. Tele shows aflutter. On NIH scoring 0. Refused LLE dressing changed however was ok with reinforcing it. Scatter wounds and scabs over bilat skins and arms. Carotid ultrasound completed. A2 with transfers.

## 2024-04-23 NOTE — PLAN OF CARE
Patient scored a 0-0 on NIHSS scale. He is up in recliner. Patient requested a wrap on bilat legs.     Sonia Simons RN      Problem: Adult Inpatient Plan of Care  Goal: Plan of Care Review  Description: The Plan of Care Review/Shift note should be completed every shift.  The Outcome Evaluation is a brief statement about your assessment that the patient is improving, declining, or no change.  This information will be displayed automatically on your shift  note.  Outcome: Progressing     Problem: Stroke, Ischemic (Includes Transient Ischemic Attack)  Goal: Optimal Coping  Outcome: Progressing     Problem: Stroke, Ischemic (Includes Transient Ischemic Attack)  Goal: Effective Oxygenation and Ventilation  Outcome: Progressing  Intervention: Optimize Oxygenation and Ventilation  Recent Flowsheet Documentation  Taken 4/22/2024 2148 by Sonia Simons RN  Head of Bed (HOB) Positioning: HOB at 20-30 degrees  Taken 4/22/2024 1938 by Sonia Simons RN  Head of Bed (HOB) Positioning: HOB at 20-30 degrees  Taken 4/22/2024 1727 by Sonia Simons RN  Head of Bed (HOB) Positioning: HOB at 20-30 degrees  Taken 4/22/2024 1607 by Sonia Simons RN  Head of Bed (HOB) Positioning: HOB at 20-30 degrees     Problem: Stroke, Ischemic (Includes Transient Ischemic Attack)  Goal: Effective Urinary Elimination  Outcome: Progressing   Goal Outcome Evaluation:

## 2024-04-23 NOTE — DISCHARGE SUMMARY
"[facility]  Hospitalist Discharge Summary      Date of Admission:  4/21/2024  Date of Discharge:  4/23/2024  Discharging Provider: Michelle Gutierrez MD  Discharge Service: Hospitalist Service    Discharge Diagnoses   Acute stroke    Clinically Significant Risk Factors     # DMII: A1C = 7.0 % (Ref range: <5.7 %) within past 6 months  # Overweight: Estimated body mass index is 26.96 kg/m  as calculated from the following:    Height as of 4/17/24: 1.778 m (5' 10\").    Weight as of 4/17/24: 85.2 kg (187 lb 14.4 oz).       Follow-ups Needed After Discharge   Follow-up Appointments     Follow Up and recommended labs and tests      Follow up with Nursing home physician.  No follow up labs or test are   needed.  Follow up with primary care provider in 1-2 weeks.  No follow up labs or   test are needed.            Unresulted Labs Ordered in the Past 30 Days of this Admission       No orders found from 3/22/2024 to 4/22/2024.            Discharge Disposition   Discharged to short-term care facility  Condition at discharge: Stable    Hospital Course   Ever Crane is a 79 year old male with PMH A-fib, MESHA, type 2 diabetes, HTN, MRSA infection, CHF, PAD, HLD, bilateral heel pressure ulcers, and NSTEMI s/p CABG admitted on 4/21/2024 after arriving to the ED with complaints of right-sided weakness x 4 days. Positive for CVA on mri.   CVA  Brain MRI reveals small acute/early subacute infarction along the posterior lateral aspect of left basal ganglia  Add aspirin per neurology (pt was on Plavix and Xarelto PTA)  Neurochecks every 4 hours  PT/OT: TCU vs back to NH with pt; await SW  Swallow eval/speech eval: deemed as no need as per ST  A flutt  Cardiac monitor  PTA Xarelto  Acute left conjunctivitis  Polymixin b-trimethoprim drops  Bilateral heel wounds  WOC consult  Type 2 diabetes  Hg A1c 7  Continue PTA Amaryl  HLD  PTA simvastatin  CHF  PTA furosemide, spironolactone  BPH  PTA tamsulosin  HTN  PTA " losartan  Patient is clinically and hemodynamically stable for discharge to TCU. Medication reconciliation was done. Follow up appointments and recommendations as shown below. Patient verbalized understanding and agreed to plan of care. All questions answered.     Consultations This Hospital Stay   NEUROLOGY IP STROKE CONSULT  SPEECH LANGUAGE PATH ADULT IP CONSULT  PHARMACY IP CONSULT  PHARMACY IP CONSULT  PHARMACY IP CONSULT  PHYSICAL THERAPY ADULT IP CONSULT  OCCUPATIONAL THERAPY ADULT IP CONSULT  REHAB ADMISSIONS LIAISON IP CONSULT  CARE MANAGEMENT / SOCIAL WORK IP CONSULT  WOUND OSTOMY CONTINENCE NURSE  IP CONSULT  SOCIAL WORK IP CONSULT  PHYSICAL THERAPY ADULT IP CONSULT  OCCUPATIONAL THERAPY ADULT IP CONSULT    Code Status   Full Code    Time Spent on this Encounter   I, Michelle Gutierrez MD, personally saw the patient today and spent greater than 30 minutes discharging this patient.       Michelle Gutierrez MD  [address] [address]  Phone: [phone]  Fax: [phone]  ______________________________________________________________________    Physical Exam   Vital Signs: Temp: 97.3  F (36.3  C) Temp src: Oral BP: 119/71 Pulse: 69   Resp: 17 SpO2: 95 % O2 Device: None (Room air)    Weight: 0 lbs 0 oz  GEN: Alert and oriented. Not in acute distress.  HEENT: Atraumatic, mucous membrane- moist and pink.  Chest: Bilateral air entry.  CVS: S1S2 regular.   Abdomen: Soft. Non-tender, non-distended. No organomegaly. No guarding or rigidity. Bowel sounds active.   CNS: right arm 4/5. Right leg 2/5. With h/o foot amputation.  Skin: no cyanosis or clubbing.        Primary Care Physician   [facility]    Discharge Orders      Primary Care - Care Coordination Referral      General info for SNF    Length of Stay Estimate: Short Term Care: Estimated # of Days <30  Condition at Discharge: Stable  Level of care:skilled   Rehabilitation  Potential: Good  Admission H&P remains valid and up-to-date: Yes  Recent Chemotherapy: N/A  Use Nursing Home Standing Orders: Yes     Mantoux instructions    Give two-step Mantoux (PPD) Per Facility Policy Yes     Follow Up and recommended labs and tests    Follow up with Nursing home physician.  No follow up labs or test are needed.  Follow up with primary care provider in 1-2 weeks.  No follow up labs or test are needed.     Reason for your hospital stay    acute stroke     Activity - Up with nursing assistance     Physical Therapy Adult Consult    Evaluate and treat as clinically indicated.    Reason:  Impaired functional mobility     Occupational Therapy Adult Consult    Evaluate and treat as clinically indicated.    Reason:  inability to complete ADLs/IADLs     Fall precautions     Diet    Follow this diet upon discharge: Orders Placed This Encounter      Combination Diet Moderate Consistent Carb (60 g CHO per Meal) Diet       Significant Results and Procedures       Discharge Medications   Current Discharge Medication List        START taking these medications    Details   aspirin 81 MG EC tablet Take 1 tablet (81 mg) by mouth daily for 21 days and then stop.    Associated Diagnoses: Acute stroke of basal ganglia (H)      polymixin b-trimethoprim (POLYTRIM) 98335-2.1 UNIT/ML-% ophthalmic solution Place 2 drops Into the left eye 4 times daily for 4 days and then stop.    Associated Diagnoses: Acute conjunctivitis of left eye, unspecified acute conjunctivitis type           CONTINUE these medications which have NOT CHANGED    Details   !! acetaminophen (TYLENOL) 325 MG tablet Take 650 mg by mouth 2 times daily as needed for mild pain      !! acetaminophen (TYLENOL) 500 MG tablet Take 1,000 mg by mouth 2 times daily      albuterol (PROAIR HFA/PROVENTIL HFA/VENTOLIN HFA) 108 (90 Base) MCG/ACT inhaler Inhale 2 puffs into the lungs 2 times daily as needed      cetirizine (ZYRTEC) 5 MG tablet Take 5 mg by mouth daily       clopidogrel (PLAVIX) 75 MG tablet Take 75 mg by mouth daily      dulaglutide (TRULICITY) 0.75 MG/0.5ML pen Inject 1.5 mg Subcutaneous every 7 days      furosemide (LASIX) 40 MG tablet Take 40 mg by mouth daily      gabapentin (NEURONTIN) 100 MG capsule Take 200 mg by mouth 2 times daily      glimepiride (AMARYL) 1 MG tablet Take 1 mg by mouth every morning (before breakfast)      hypromellose (ARTIFICIAL TEARS) 0.5 % SOLN ophthalmic solution Place 1 drop Into the left eye 4 times daily as needed for dry eyes      ketoconazole (NIZORAL) 2 % external shampoo Apply topically twice a week Mon and Fri.      losartan (COZAAR) 25 MG tablet Take 12.5 mg by mouth daily      naloxone (NARCAN) 0.4 MG/ML injection Inject 0.1-0.4 mg into the muscle once as needed for opioid reversal      omeprazole (PRILOSEC) 20 MG CR capsule Take 20 mg by mouth daily      rivaroxaban ANTICOAGULANT (XARELTO) 15 MG TABS tablet Take 20 mg by mouth daily      simvastatin (ZOCOR) 40 MG tablet Take 40 mg by mouth At Bedtime      spironolactone (ALDACTONE) 25 MG tablet Take 25 mg by mouth daily      tamsulosin (FLOMAX) 0.4 MG capsule Take 0.4 mg by mouth daily       !! - Potential duplicate medications found. Please discuss with provider.        Allergies   Allergies   Allergen Reactions    Lanolin      Other Reaction(s): Not available    Cranberry Extract Itching and Rash    Doxycycline Rash    Latex Rash    Penicillin V Rash     Reaction occurred as a child. Patient tolerated Zosyn 6/2018, Cefazolin 10/2018, and has also tolerated Augmentin.    Penicillins Rash     Reaction occurred as a child. Patient tolerated Zosyn 6/2018, Cefazolin 10/2018, and has also tolerated Augmentin.

## 2024-04-23 NOTE — PROGRESS NOTES
"Care Management Follow Up    Length of Stay (days): 1    Expected Discharge Date: 04/24/2024     Concerns to be Addressed: discharge planning     Patient plan of care discussed at interdisciplinary rounds: Yes    Anticipated Discharge Disposition:       Anticipated Discharge Services:    Education Provided on the Discharge Plan:  Per Care Team   Patient/Family in Agreement with the Plan:  Yes    Referrals Placed by CM/SW:    Private pay costs discussed: Not applicable    Additional Information:  Chart reviewed.    Cm updates:  Neuro signed off today.   Writer paged provider to see if pt is med ready, awaiting response.     [facility]      Pt will need w/c transport at discharge per sister.       Social hx:  \"Pt was at [facility] prior to admission. Pt has MA and has an automatic 18 days bed hold. Pt wants to return to [facility].   [employer] transport.\"        Cm will continue to follow plan of care,review recommendations, and assist with any discharge needs anticipated.          Miriam Hernandez RN      Care Management Discharge Note    Discharge Date: 04/24/2024       Discharge Disposition: Transitional Care    Discharge Services:  TCU       Discharge Transportation: health plan transportation    Private pay costs discussed: transportation costs    Does the patient's insurance plan have a 3 day qualifying hospital stay waiver?  No    PAS Confirmation Code:  Not needed   Patient/family educated on Medicare website which has current facility and service quality ratings:  Yes    Education Provided on the Discharge Plan:  Per Care Team   Persons Notified of Discharge Plans: Yes  Patient/Family in Agreement with the Plan:      Handoff Referral Completed: Yes    Additional Information:    Final discharge plan is for pt to [facility] Pt agreeable to the private cost of w/c transport. [employer] w/c transport set up for 4:08pm-4:53pm today 4/23. Bedside, provider, " facility, pt, and pt's sister updated.       No PAS needed.     Miriam Hernandez RN

## 2024-04-24 ENCOUNTER — PATIENT OUTREACH (OUTPATIENT)
Dept: CARE COORDINATION | Facility: CLINIC | Age: 79
End: 2024-04-24
Payer: MEDICARE

## 2024-04-24 NOTE — PROGRESS NOTES
Clinic Care Coordination Chart Review     Situation: Patient chart reviewed by care coordination leader.     Background: Primary Care Care Coordination referral entered by Inpatient Care Manager through IP to Amb CM handoff process.     Assessment: Per chart review, patient has primary care provider outside of [facility].  Primary Care Care Coordination team supports patients in collaboration with their [facility] PCP therefore this patient would not be a candidate for outreach.       Plan/Recommendations: Patient can be encouraged to follow discharge instructions as outlined on AVS. No intervention planned by [facility] Primary Care Care Coordination team at this time.     Char Raman, AURYN, RN   Manager of Ambulatory Care Management  [facility]

## 2024-04-26 ENCOUNTER — TRANSITIONAL CARE UNIT VISIT (OUTPATIENT)
Dept: GERIATRICS | Facility: CLINIC | Age: 79
End: 2024-04-26
Payer: MEDICARE

## 2024-04-26 VITALS
TEMPERATURE: 98.7 F | WEIGHT: 184.4 LBS | OXYGEN SATURATION: 95 % | DIASTOLIC BLOOD PRESSURE: 86 MMHG | BODY MASS INDEX: 26.46 KG/M2 | HEART RATE: 60 BPM | SYSTOLIC BLOOD PRESSURE: 148 MMHG | RESPIRATION RATE: 16 BRPM

## 2024-04-26 DIAGNOSIS — I48.19 PERSISTENT ATRIAL FIBRILLATION (H): ICD-10-CM

## 2024-04-26 DIAGNOSIS — I73.9 PERIPHERAL VASCULAR DISEASE (H): ICD-10-CM

## 2024-04-26 DIAGNOSIS — Z86.73 HISTORY OF CVA (CEREBROVASCULAR ACCIDENT): Primary | ICD-10-CM

## 2024-04-26 DIAGNOSIS — H10.33 ACUTE CONJUNCTIVITIS OF BOTH EYES, UNSPECIFIED ACUTE CONJUNCTIVITIS TYPE: ICD-10-CM

## 2024-04-26 DIAGNOSIS — E11.42 POLYNEUROPATHY DUE TO TYPE 2 DIABETES MELLITUS (H): ICD-10-CM

## 2024-04-26 DIAGNOSIS — R53.1 RIGHT SIDED WEAKNESS: ICD-10-CM

## 2024-04-26 DIAGNOSIS — F03.90 DEMENTIA, UNSPECIFIED DEMENTIA SEVERITY, UNSPECIFIED DEMENTIA TYPE, UNSPECIFIED WHETHER BEHAVIORAL, PSYCHOTIC, OR MOOD DISTURBANCE OR ANXIETY (H): ICD-10-CM

## 2024-04-26 PROCEDURE — 99310 SBSQ NF CARE HIGH MDM 45: CPT | Performed by: NURSE PRACTITIONER

## 2024-04-26 NOTE — PROGRESS NOTES
[facility] Follow Up  PCP & CLINIC: [facility], [address], [address] [address]  Chief Complaint   Patient presents with    Hospital F/U   [place] MRN: [id number]. Place of Service where encounter took place:  Critical access hospital ON [facility] (TCU) [[id number]] Ever Crane  is a 79 year old  (1945), admitted to the above facility from  [facility]. Hospital stay 4/21 through 4/23. Admitted to this facility for  rehab, medical management, and nursing care. HPI information obtained from: facility chart records, facility staff, patient report, [facility] chart review, and Care Everywhere [facility] chart review.     Brief Summary of Hospital Course: Ever was sent from LTC/TCU to [facility] on 4/21 for weakness. He was found to have an early/subacute left basal ganglia infarct in the setting of both Plavix and Xarelto. Aspirin was added, swallow study administered, and another round polymixinB gtts for acute conjunctivitis completed.     Updates since return to LTC/transitional care unit: Ever returned to LTC/TCU on 4/23 s/p the above hospitalization. Today, Ever says he is doing pretty good.  He does not have any, vision changes, dizziness, shortness of breath, cough, fever.  He notes a little bit of right upper extremity weakness, but feels like his legs are working fine.  He is not feeling as weak as he was before.  His appetite is not very good, but he denies any nausea or heartburn.  Sometimes his bowels fluctuate, but he went today.  He denies any bladder changes.  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  He talks about when he was ticketed for driving the wrong way down a one-way street and [place], and he feels like he needs to get back to his life and what he was doing before.  He then demonstrates how he would like to take a lighter and then and a plastic spoon  so he could use his utensils easier (given he is right-handed), we also spoke at length about ambulation.    CODE STATUS/ADVANCE DIRECTIVES DISCUSSION: CPR/Full code . Patient's living condition: lives in a skilled nursing facility. ALLERGIES: Lanolin, Cranberry extract, Doxycycline, Latex, Penicillin v, and Penicillins PAST MEDICAL HISTORY:  has a past medical history of A-fib (H), Acute diastolic heart failure (H) (06/07/2022), Acute posthemorrhagic anemia (05/17/2022), MESHA (acute kidney injury) (H24), Anemia, Atopic keratoconjunctivitis, Atrial fibrillation (H), Atrial flutter (H), Mcgarry's esophagus (01/01/2012), Bilateral lower leg cellulitis (08/31/2018), BPH (benign prostatic hyperplasia), Candidiasis of perineum (01/03/2018), Carotid stenosis, Carotid stenosis, asymptomatic, right, Cellulitis, CHF (congestive heart failure) (H), Cholecystitis (10/14/2019), Cholecystitis, acute (08/18/2019), Chronic systolic heart failure (H), Chronic venous stasis dermatitis, Closed nondisplaced intertrochanteric fracture of left femur with routine healing, subsequent encounter (05/18/2022), Clostridium difficile colitis, Contact with and (suspected) exposure to covid-19 (12/13/2021), COPD (chronic obstructive pulmonary disease) (H), Coronary artery disease due to lipid rich plaque (2000), Coronary atherosclerosis, Diabetes (H), Diabetic ulcer of both feet (H) (10/31/2017), Diabetic ulcer of toe of right foot associated with diabetes mellitus due to underlying condition, limited to breakdown of skin (H) (01/05/2023), Diabetic ulcer of toe of right foot associated with diabetes mellitus due to underlying condition, with necrosis of bone (H) (01/05/2023), Dyslexia, Dyslipidemia, goal LDL below 70 (2000), Epistaxis, Essential hypertension, Gangrene of left foot (H), GERD (gastroesophageal reflux disease), HLD (hyperlipidemia), HTN (hypertension), Hyponatremia, Ischemic heart disease, Lymphedema, Mild cognitive impairment, MRSA  (methicillin resistant Staphylococcus aureus), Neuropathy, Non-STEMI (non-ST elevated myocardial infarction) (H), Occlusion and stenosis of right carotid artery, Osteoarthrosis, Osteomyelitis of ankle and foot (H), Other atopic dermatitis, PAD (peripheral artery disease) (H24), Peripheral vascular disease (H24), Pneumonia (06/26/2018), Polyneuropathy due to type 2 diabetes mellitus (H), Pressure ulcer, heel, Renal insufficiency, Type 2 diabetes mellitus, without long-term current use of insulin (H), Ulcer of right foot (H), and Unable to function independently (11/13/2017).    He has no past medical history of Complication of anesthesia or Malignant hyperthermia.. PAST SURGICAL HISTORY:   has a past surgical history that includes cabg measures grp; IR Extremity Angiogram Bilateral (11/3/2017); Bypass graft artery coronary (N/A, 03/06/2000); Cardioversion (08/25/2011); Amputate foot (Left, 11/05/2017); Inguinal Hernia Repair (Bilateral, 1969); Cv Coronary Angiogram (N/A, 10/01/2018); Laparoscopic cholecystectomy (N/A, 08/18/2019); Bypass graft artery coronary (N/A, 10/04/2018); IR Lower Extremity Angiogram Right (1/24/2023); IR Lower Extremity Angiogram Left (3/10/2023); Amputate foot (Right, 4/27/2023); Lengthen tendon achilles (Right, 4/27/2023); Incision and drainage foot, combined (Right, 5/15/2023); Amputate foot (Right, 5/15/2023); and Incision and drainage foot, combined (Bilateral, 6/1/2023).. FAMILY HISTORY: family history includes CABG in his father; Esophageal Cancer (age of onset: 58.00) in his father; No Known Problems in his grandchild, grandchild, sister, sister, and son; Obesity in his sister; Osteoarthritis in his sister; Sudden Death (age of onset: 85.00) in his mother; Valvular heart disease in his father.. SOCIAL HISTORY:   reports that he quit smoking about 24 years ago. His smoking use included cigarettes. He started smoking about 59 years ago. He has a 36 pack-year smoking history. He has never  used smokeless tobacco. He reports that he does not currently use alcohol after a past usage of about 5.0 standard drinks of alcohol per week. He reports that he does not use drugs.  Post Discharge Medication Reconciliation Status: discharge medications reconciled and changed, per note/orders.  Current Outpatient Medications   Medication Sig Dispense Refill    acetaminophen (TYLENOL) 325 MG tablet Take 650 mg by mouth 2 times daily as needed for mild pain      acetaminophen (TYLENOL) 500 MG tablet Take 1,000 mg by mouth 2 times daily      albuterol (PROAIR HFA/PROVENTIL HFA/VENTOLIN HFA) 108 (90 Base) MCG/ACT inhaler Inhale 2 puffs into the lungs 2 times daily as needed      aspirin 81 MG EC tablet Take 1 tablet (81 mg) by mouth daily for 21 days and then stop.      cetirizine (ZYRTEC) 5 MG tablet Take 5 mg by mouth daily      clopidogrel (PLAVIX) 75 MG tablet Take 75 mg by mouth daily      dulaglutide (TRULICITY) 0.75 MG/0.5ML pen Inject 1.5 mg Subcutaneous every 7 days      furosemide (LASIX) 40 MG tablet Take 40 mg by mouth daily      gabapentin (NEURONTIN) 100 MG capsule Take 200 mg by mouth 2 times daily      glimepiride (AMARYL) 1 MG tablet Take 1 mg by mouth every morning (before breakfast)      hypromellose (ARTIFICIAL TEARS) 0.5 % SOLN ophthalmic solution Place 1 drop Into the left eye 4 times daily as needed for dry eyes      ketoconazole (NIZORAL) 2 % external shampoo Apply topically twice a week Mon and Fri.      losartan (COZAAR) 25 MG tablet Take 12.5 mg by mouth daily      naloxone (NARCAN) 0.4 MG/ML injection Inject 0.1-0.4 mg into the muscle once as needed for opioid reversal      omeprazole (PRILOSEC) 20 MG CR capsule Take 20 mg by mouth daily      polymixin b-trimethoprim (POLYTRIM) 27361-7.1 UNIT/ML-% ophthalmic solution Place 2 drops Into the left eye 4 times daily for 4 days and then stop.      rivaroxaban ANTICOAGULANT (XARELTO) 15 MG TABS tablet Take 20 mg by mouth daily      simvastatin  (ZOCOR) 40 MG tablet Take 40 mg by mouth At Bedtime      spironolactone (ALDACTONE) 25 MG tablet Take 25 mg by mouth daily      tamsulosin (FLOMAX) 0.4 MG capsule Take 0.4 mg by mouth daily       ROS: Limited secondary to cognitive impairment but today pt reports the above and 4 point ROS including Respiratory, CV, GI and , other than that noted in the HPI, is negative.     Vitals: BP (!) 148/86   Pulse 60   Temp 98.7  F (37.1  C)   Resp 16   Wt 83.6 kg (184 lb 6.4 oz)   SpO2 95%   BMI 26.46 kg/m    Exam:  GENERAL APPEARANCE: Alert, in no distress, cooperative.   ENT: Mouth/posterior oropharynx intact w/ moist mucous membranes, hearing acuity [place].  EYES: EOM, conjunctivae, lids, pupils and irises normal, PERRL2.  Left lower lid ectropion and conjunctivitis is ongoing.  RESP: Respiratory effort good, no respiratory distress, Lung sounds clear. On RA.   CV: Auscultation of heart reveals S1, S2, rate controlled and rhythm irregular, no murmur, no rub or gallop, Edema 1+ BLE. Peripheral pulses are 2+.  ABDOMEN: Normal bowel sounds, soft, non-tender abdomen, and no masses palpated.  SKIN: Inspection/Palpation of skin and subcutaneous tissue baseline w/ fragility. No wounds/rashes noted except some mild dermatitis in bilateral lower extremities and new right heel wound which is pictured here:    NEURO: CN II-XII at patient's baseline, sensation baseline PPS.  PSYCH: Insight, judgement, and memory are impaired at baseline, affect and mood are happy/engaged/tangential/impulsive.    Lab/Diagnostic data: Recent labs in [facility] reviewed by me today.     ASSESSMENT/PLAN:  History of CVA (cerebrovascular accident)  Acute conjunctivitis of both eyes, unspecified acute conjunctivitis type  Dementia, unspecified dementia severity, unspecified dementia type, unspecified whether behavioral, psychotic, or mood disturbance or anxiety (H)  Persistent atrial fibrillation (H)  Polyneuropathy due to type 2 diabetes mellitus  (H)  Peripheral vascular disease (H24)  Acute on chronic. Complex/Tenuous.   Provider reviewed records from hospitalization, facility, and interpreted most recent imaging/lab work (CBC+carotid ultrasound), and vital signs (as noted above).  Provider personally coordinated care with Dr. Spears (previous surgeon).  Ideally, a cam boot would be used for all ambulation if Ever is to ambulate.  He has remained nonweightbearing, but not adhered to this for the entirety of his stay.  Ever is known to utilize wash basins to put his partial remaining feet into and use these basins to scoot around and walk in the hallway.  Provider personally counseled Ever that using basins to walk with is a fall risk and he would be at risk for injury or worsening of his wounds.  Provider counseled Ever on orthopedic options and coordination with orthotic team to procure the appropriate footwear or prosthesis.  Provider also encouraged Ever to use the shoes he has in his room.  He has a pair of black Velcro tennis shoes, and a pair of slip on sandals, both of which he does not use.  Ever was not receptive to counseling.  He believes there is no risk in continuing to walk with basins.  He is adamant that he will not utilize foot wear.  Will stop prafo boots, as he refuses these and always has.  Will discontinue his nonweightbearing orders, as he is constantly nonadherent to these.  Will ask nursing to complete a risk form with him/sister.  He admitted to excepting this risk.  Provider coordinated care with nursing around the above.  Noting mild right upper extremity deficits status post ischemic CVA.  Educated Ever on ultrasound results.  Continue aspirin, Plavix, Xarelto.  Patient is also on PPI protection.  Provider coordinated care with rehabilitation services to start SLP evaluation and OT evaluation.  Noting Ever's safety concerns around the use of silverware and a lighter.  There is no need for Ever to fashion his own adaptive  utensils, this can be retrofitted for him.  Provider counseled him around this and showed him examples.    Noting recurrence of previous impetigo above cupids bow.  Will reinstitute Bactroban as noted below.  Noting resistant and recurrent acute conjunctivitis, ectropion, despite efforts in-house.  Patient remains on polymyxin drops and is to be assisted with eye hygiene.  Will also consult [place] [facility] where patient is well established.  Follow up w/in 1 week or as needed.    Orders:  Discontinue Prafo boots.  Change activity order to WBAT and have patient/family sign risk waiver. Dx: PVD.   SLP eval/tx x1, routine. Dx: CVA.  Mupircin 2% ointment, apply to cupid's bow/below nostrils TID x 5 days. Dx: impetigo.  Consult [facility] (established). Dx: recurrent blepharitis.   OT eval/tx x1, routine. Dx: adaptive silverware.     Electronically signed by:  Dr. Yanna Dozier, APRN CNP DNP

## 2024-04-26 NOTE — LETTER
4/26/2024        RE: Ever Crane  [address] [facility] [address]        [facility] Follow Up  PCP & CLINIC: [facility], [address] [facility], [address] [address]  Chief Complaint   Patient presents with     Hospital F/U   [facility] MRN: [id number]. Place of Service where encounter took place:  [facility] [place] (TCU) [[id number]] Ever Crane  is a 79 year old  (1945), admitted to the above facility from  [facility]. Hospital stay 4/21 through 4/23. Admitted to this facility for  rehab, medical management, and nursing care. HPI information obtained from: facility chart records, facility staff, patient report, [facility] chart review, and Care Everywhere [facility] chart review.     Brief Summary of Hospital Course: Ever was sent from LTC/TCU to [facility] on 4/21 for weakness. He was found to have an early/subacute left basal ganglia infarct in the setting of both Plavix and Xarelto. Aspirin was added, swallow study administered, and another round polymixinB gtts for acute conjunctivitis completed.     Updates since return to LTC/transitional care unit: Ever returned to LTC/TCU on 4/23 s/p the above hospitalization. Today, Ever says he is doing pretty good.  He does not have any, vision changes, dizziness, shortness of breath, cough, fever.  He notes a little bit of right upper extremity weakness, but feels like his legs are working fine.  He is not feeling as weak as he was before.  His appetite is not very good, but he denies any nausea or heartburn.  Sometimes his bowels fluctuate, but he went today.  He denies any bladder changes.  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  He talks about when he was ticketed for driving the wrong way down a one-way street and [place], and he feels like he needs to get back to his life and what he was doing before.   He then demonstrates how he would like to take a lighter and then and a plastic spoon so he could use his utensils easier (given he is right-handed), we also spoke at length about ambulation.    CODE STATUS/ADVANCE DIRECTIVES DISCUSSION: CPR/Full code . Patient's living condition: lives in a skilled nursing facility. ALLERGIES: Lanolin, Cranberry extract, Doxycycline, Latex, Penicillin v, and Penicillins PAST MEDICAL HISTORY:  has a past medical history of A-fib (H), Acute diastolic heart failure (H) (06/07/2022), Acute posthemorrhagic anemia (05/17/2022), MESHA (acute kidney injury) (H24), Anemia, Atopic keratoconjunctivitis, Atrial fibrillation (H), Atrial flutter (H), Mcgarry's esophagus (01/01/2012), Bilateral lower leg cellulitis (08/31/2018), BPH (benign prostatic hyperplasia), Candidiasis of perineum (01/03/2018), Carotid stenosis, Carotid stenosis, asymptomatic, right, Cellulitis, CHF (congestive heart failure) (H), Cholecystitis (10/14/2019), Cholecystitis, acute (08/18/2019), Chronic systolic heart failure (H), Chronic venous stasis dermatitis, Closed nondisplaced intertrochanteric fracture of left femur with routine healing, subsequent encounter (05/18/2022), Clostridium difficile colitis, Contact with and (suspected) exposure to covid-19 (12/13/2021), COPD (chronic obstructive pulmonary disease) (H), Coronary artery disease due to lipid rich plaque (2000), Coronary atherosclerosis, Diabetes (H), Diabetic ulcer of both feet (H) (10/31/2017), Diabetic ulcer of toe of right foot associated with diabetes mellitus due to underlying condition, limited to breakdown of skin (H) (01/05/2023), Diabetic ulcer of toe of right foot associated with diabetes mellitus due to underlying condition, with necrosis of bone (H) (01/05/2023), Dyslexia, Dyslipidemia, goal LDL below 70 (2000), Epistaxis, Essential hypertension, Gangrene of left foot (H), GERD (gastroesophageal reflux disease), HLD (hyperlipidemia), HTN  (hypertension), Hyponatremia, Ischemic heart disease, Lymphedema, Mild cognitive impairment, MRSA (methicillin resistant Staphylococcus aureus), Neuropathy, Non-STEMI (non-ST elevated myocardial infarction) (H), Occlusion and stenosis of right carotid artery, Osteoarthrosis, Osteomyelitis of ankle and foot (H), Other atopic dermatitis, PAD (peripheral artery disease) (H24), Peripheral vascular disease (H24), Pneumonia (06/26/2018), Polyneuropathy due to type 2 diabetes mellitus (H), Pressure ulcer, heel, Renal insufficiency, Type 2 diabetes mellitus, without long-term current use of insulin (H), Ulcer of right foot (H), and Unable to function independently (11/13/2017).    He has no past medical history of Complication of anesthesia or Malignant hyperthermia.. PAST SURGICAL HISTORY:   has a past surgical history that includes cabg measures grp; IR Extremity Angiogram Bilateral (11/3/2017); Bypass graft artery coronary (N/A, 03/06/2000); Cardioversion (08/25/2011); Amputate foot (Left, 11/05/2017); Inguinal Hernia Repair (Bilateral, 1969); Cv Coronary Angiogram (N/A, 10/01/2018); Laparoscopic cholecystectomy (N/A, 08/18/2019); Bypass graft artery coronary (N/A, 10/04/2018); IR Lower Extremity Angiogram Right (1/24/2023); IR Lower Extremity Angiogram Left (3/10/2023); Amputate foot (Right, 4/27/2023); Lengthen tendon achilles (Right, 4/27/2023); Incision and drainage foot, combined (Right, 5/15/2023); Amputate foot (Right, 5/15/2023); and Incision and drainage foot, combined (Bilateral, 6/1/2023).. FAMILY HISTORY: family history includes CABG in his father; Esophageal Cancer (age of onset: 58.00) in his father; No Known Problems in his grandchild, grandchild, sister, sister, and son; Obesity in his sister; Osteoarthritis in his sister; Sudden Death (age of onset: 85.00) in his mother; Valvular heart disease in his father.. SOCIAL HISTORY:   reports that he quit smoking about 24 years ago. His smoking use included  cigarettes. He started smoking about 59 years ago. He has a 36 pack-year smoking history. He has never used smokeless tobacco. He reports that he does not currently use alcohol after a past usage of about 5.0 standard drinks of alcohol per week. He reports that he does not use drugs.  Post Discharge Medication Reconciliation Status: discharge medications reconciled and changed, per note/orders.  Current Outpatient Medications   Medication Sig Dispense Refill     acetaminophen (TYLENOL) 325 MG tablet Take 650 mg by mouth 2 times daily as needed for mild pain       acetaminophen (TYLENOL) 500 MG tablet Take 1,000 mg by mouth 2 times daily       albuterol (PROAIR HFA/PROVENTIL HFA/VENTOLIN HFA) 108 (90 Base) MCG/ACT inhaler Inhale 2 puffs into the lungs 2 times daily as needed       aspirin 81 MG EC tablet Take 1 tablet (81 mg) by mouth daily for 21 days and then stop.       cetirizine (ZYRTEC) 5 MG tablet Take 5 mg by mouth daily       clopidogrel (PLAVIX) 75 MG tablet Take 75 mg by mouth daily       dulaglutide (TRULICITY) 0.75 MG/0.5ML pen Inject 1.5 mg Subcutaneous every 7 days       furosemide (LASIX) 40 MG tablet Take 40 mg by mouth daily       gabapentin (NEURONTIN) 100 MG capsule Take 200 mg by mouth 2 times daily       glimepiride (AMARYL) 1 MG tablet Take 1 mg by mouth every morning (before breakfast)       hypromellose (ARTIFICIAL TEARS) 0.5 % SOLN ophthalmic solution Place 1 drop Into the left eye 4 times daily as needed for dry eyes       ketoconazole (NIZORAL) 2 % external shampoo Apply topically twice a week Mon and Fri.       losartan (COZAAR) 25 MG tablet Take 12.5 mg by mouth daily       naloxone (NARCAN) 0.4 MG/ML injection Inject 0.1-0.4 mg into the muscle once as needed for opioid reversal       omeprazole (PRILOSEC) 20 MG CR capsule Take 20 mg by mouth daily       polymixin b-trimethoprim (POLYTRIM) 90801-6.1 UNIT/ML-% ophthalmic solution Place 2 drops Into the left eye 4 times daily for 4 days  and then stop.       rivaroxaban ANTICOAGULANT (XARELTO) 15 MG TABS tablet Take 20 mg by mouth daily       simvastatin (ZOCOR) 40 MG tablet Take 40 mg by mouth At Bedtime       spironolactone (ALDACTONE) 25 MG tablet Take 25 mg by mouth daily       tamsulosin (FLOMAX) 0.4 MG capsule Take 0.4 mg by mouth daily       ROS: Limited secondary to cognitive impairment but today pt reports the above and 4 point ROS including Respiratory, CV, GI and , other than that noted in the HPI, is negative.     Vitals: BP (!) 148/86   Pulse 60   Temp 98.7  F (37.1  C)   Resp 16   Wt 83.6 kg (184 lb 6.4 oz)   SpO2 95%   BMI 26.46 kg/m    Exam:  GENERAL APPEARANCE: Alert, in no distress, cooperative.   ENT: Mouth/posterior oropharynx intact w/ moist mucous membranes, hearing acuity [place].  EYES: EOM, conjunctivae, lids, pupils and irises normal, PERRL2.  Left lower lid ectropion and conjunctivitis is ongoing.  RESP: Respiratory effort good, no respiratory distress, Lung sounds clear. On RA.   CV: Auscultation of heart reveals S1, S2, rate controlled and rhythm irregular, no murmur, no rub or gallop, Edema 1+ BLE. Peripheral pulses are 2+.  ABDOMEN: Normal bowel sounds, soft, non-tender abdomen, and no masses palpated.  SKIN: Inspection/Palpation of skin and subcutaneous tissue baseline w/ fragility. No wounds/rashes noted except some mild dermatitis in bilateral lower extremities and new right heel wound which is pictured here:    NEURO: CN II-XII at patient's baseline, sensation baseline PPS.  PSYCH: Insight, judgement, and memory are impaired at baseline, affect and mood are happy/engaged/tangential/impulsive.    Lab/Diagnostic data: Recent labs in [facility] reviewed by me today.     ASSESSMENT/PLAN:  History of CVA (cerebrovascular accident)  Acute conjunctivitis of both eyes, unspecified acute conjunctivitis type  Dementia, unspecified dementia severity, unspecified dementia type, unspecified whether behavioral, psychotic, or mood  disturbance or anxiety (H)  Persistent atrial fibrillation (H)  Polyneuropathy due to type 2 diabetes mellitus (H)  Peripheral vascular disease (H24)  Acute on chronic. Complex/Tenuous.   Provider reviewed records from hospitalization, facility, and interpreted most recent imaging/lab work (CBC+carotid ultrasound), and vital signs (as noted above).  Provider personally coordinated care with Dr. Spears (previous surgeon).  Ideally, a cam boot would be used for all ambulation if Ever is to ambulate.  He has remained nonweightbearing, but not adhered to this for the entirety of his stay.  Ever is known to utilize wash basins to put his partial remaining feet into and use these basins to scoot around and walk in the hallway.  Provider personally counseled Ever that using basins to walk with is a fall risk and he would be at risk for injury or worsening of his wounds.  Provider counseled Ever on orthopedic options and coordination with orthotic team to procure the appropriate footwear or prosthesis.  Provider also encouraged Ever to use the shoes he has in his room.  He has a pair of black Velcro tennis shoes, and a pair of slip on sandals, both of which he does not use.  Ever was not receptive to counseling.  He believes there is no risk in continuing to walk with basins.  He is adamant that he will not utilize foot wear.  Will stop prafo boots, as he refuses these and always has.  Will discontinue his nonweightbearing orders, as he is constantly nonadherent to these.  Will ask nursing to complete a risk form with him/sister.  He admitted to excepting this risk.  Provider coordinated care with nursing around the above.  Noting mild right upper extremity deficits status post ischemic CVA.  Educated Ever on ultrasound results.  Continue aspirin, Plavix, Xarelto.  Patient is also on PPI protection.  Provider coordinated care with rehabilitation services to start SLP evaluation and OT evaluation.  Noting Ever's safety  concerns around the use of silverware and a lighter.  There is no need for Ever to fashion his own adaptive utensils, this can be retrofitted for him.  Provider counseled him around this and showed him examples.    Noting recurrence of previous impetigo above cupids bow.  Will reinstitute Bactroban as noted below.  Noting resistant and recurrent acute conjunctivitis, ectropion, despite efforts in-house.  Patient remains on polymyxin drops and is to be assisted with eye hygiene.  Will also consult [facility] where patient is well established.  Follow up w/in 1 week or as needed.    Orders:  Discontinue Prafo boots.  Change activity order to WBAT and have patient/family sign risk waiver. Dx: PVD.   SLP eval/tx x1, routine. Dx: CVA.  Mupircin 2% ointment, apply to cupid's bow/below nostrils TID x 5 days. Dx: impetigo.  Consult [facility] (established). Dx: recurrent blepharitis.   OT eval/tx x1, routine. Dx: adaptive silverware.     Electronically signed by:  Dr. Yanna Dozier APRN CNP DNP                      Sincerely,        Yanna Dozier, KEVIN CNP

## 2024-04-28 LAB
ATRIAL RATE - MUSE: 278 BPM
DIASTOLIC BLOOD PRESSURE - MUSE: 78 MMHG
INTERPRETATION ECG - MUSE: NORMAL
P AXIS - MUSE: 63 DEGREES
PR INTERVAL - MUSE: NORMAL MS
QRS DURATION - MUSE: 94 MS
QT - MUSE: 404 MS
QTC - MUSE: 463 MS
R AXIS - MUSE: 12 DEGREES
SYSTOLIC BLOOD PRESSURE - MUSE: 185 MMHG
T AXIS - MUSE: 65 DEGREES
VENTRICULAR RATE- MUSE: 79 BPM

## 2024-05-09 NOTE — PROGRESS NOTES
[facility] GERIATRIC SERVICE  Episodic/Acute/Follow-Up  [place] MRN: [id number]. Place of Service where encounter took place:  [facility] ([facility]) [4002]   Chief Complaint   Patient presents with    RECHECK    HPI: Ever Crane  is a 79 year old (1945), who is being seen today for an episodic care visit. Today's concern is:    Ever seen today on routine follow-up as he continues to reside on [facility], as a long-term care patient.  Few weeks ago, he returned from the hospital after CVA affecting the right upper and right lower extremity.    Today, he continues to have right-sided weakness, but it is very slight.  Something he has noticed as he fatigues very easily.  He does have some postnasal drip and a chronic cough which he feels like he is always had but he is not sure if it is worse.  He denies shortness of breath or wheezing.  He denies chest pain or palpitations.  He denies headaches or dizziness.  He denies any sinus pressure.  His eyes about the same, but his cupids bow has completely cleared up.  He denies any itching or changes to his lower extremities.    Past Medical and Surgical History reviewed in Epic today.  MEDICATIONS:  Current Outpatient Medications   Medication Sig Dispense Refill    acetaminophen (TYLENOL) 325 MG tablet Take 650 mg by mouth 2 times daily as needed for mild pain      acetaminophen (TYLENOL) 500 MG tablet Take 1,000 mg by mouth 2 times daily      albuterol (PROAIR HFA/PROVENTIL HFA/VENTOLIN HFA) 108 (90 Base) MCG/ACT inhaler Inhale 2 puffs into the lungs 2 times daily as needed      aspirin 81 MG EC tablet Take 1 tablet (81 mg) by mouth daily for 21 days and then stop.      cetirizine (ZYRTEC) 5 MG tablet Take 5 mg by mouth daily      clopidogrel (PLAVIX) 75 MG tablet Take 75 mg by mouth daily      dulaglutide (TRULICITY) 0.75 MG/0.5ML pen Inject 1.5 mg Subcutaneous every 7 days      furosemide (LASIX) 40 MG tablet Take 40 mg by mouth daily      gabapentin (NEURONTIN)  100 MG capsule Take 200 mg by mouth 2 times daily      glimepiride (AMARYL) 1 MG tablet Take 1 mg by mouth every morning (before breakfast)      hypromellose (ARTIFICIAL TEARS) 0.5 % SOLN ophthalmic solution Place 1 drop Into the left eye 4 times daily as needed for dry eyes      ketoconazole (NIZORAL) 2 % external shampoo Apply topically twice a week Mon and Fri.      losartan (COZAAR) 25 MG tablet Take 12.5 mg by mouth daily      naloxone (NARCAN) 0.4 MG/ML injection Inject 0.1-0.4 mg into the muscle once as needed for opioid reversal      omeprazole (PRILOSEC) 20 MG CR capsule Take 20 mg by mouth daily      rivaroxaban ANTICOAGULANT (XARELTO) 15 MG TABS tablet Take 20 mg by mouth daily      simvastatin (ZOCOR) 40 MG tablet Take 40 mg by mouth At Bedtime      spironolactone (ALDACTONE) 25 MG tablet Take 25 mg by mouth daily      tamsulosin (FLOMAX) 0.4 MG capsule Take 0.4 mg by mouth daily       Objective: /72   Pulse 81   Temp 97.9  F (36.6  C)   Resp 18   Wt 86.2 kg (190 lb 1.6 oz)   SpO2 96%   BMI 27.28 kg/m    Exam:  GENERAL APPEARANCE: Alert, in no distress, cooperative.   ENT: Left eye still has residual redness w/ left lower lid ectropion, unchanged. Cupid's bow improved.   RESP: Respiratory effort good, no respiratory distress, Lung sounds clear. On RA.   CV: Edema trace BLE.  Skin:  Right heel:    Left lower leg, lateral:    PSYCH: Insight, judgement, and memory are baseline impaired, affect and mood are happy/engaged, tired.    Labs: Labs done in facility are in EPIC. Please refer to them using [facility]/Care Everywhere.    ASSESSMENT/PLAN:  History of CVA (cerebrovascular accident)  Dementia, unspecified dementia severity, unspecified dementia type, unspecified whether behavioral, psychotic, or mood disturbance or anxiety (H)  Polyneuropathy due to type 2 diabetes mellitus (H)  Peripheral vascular disease (H24)  Acute conjunctivitis of both eyes, unspecified acute conjunctivitis  type  Impetigo  Acute on chronic. Ongoing.  Provider reviewed records from facility, and interpreted most recent imaging/lab work and vital signs (such as weights, , each with their own characteristics requiring MDM.  Lower extremities as pictured above and managed by wound care team in facility.  Will defer to them.  Pending appointment with [facility], unclear when this is scheduled for. Left eye ectropion is not bothersome and Ever is cleaning this.   Post CVA, PT working w/ Ever. He continues to bear weight and walk without shoes in pink basin/buckets. Despite our counseling. It's recommended he have wheelchair level for mobilizing.   No diet or weight concerns. No edema ongoing.  Discontinue daily weights.   Follow up w/in 1 week or as needed.    Orders:  Discontinue daily wts.     Electronically signed by:  Dr. Yanna Dozier, KEVIN CNP DNP

## 2024-05-10 ENCOUNTER — TRANSITIONAL CARE UNIT VISIT (OUTPATIENT)
Dept: GERIATRICS | Facility: CLINIC | Age: 79
End: 2024-05-10
Payer: MEDICARE

## 2024-05-10 VITALS
RESPIRATION RATE: 18 BRPM | SYSTOLIC BLOOD PRESSURE: 120 MMHG | BODY MASS INDEX: 27.28 KG/M2 | OXYGEN SATURATION: 96 % | DIASTOLIC BLOOD PRESSURE: 72 MMHG | WEIGHT: 190.1 LBS | HEART RATE: 81 BPM | TEMPERATURE: 97.9 F

## 2024-05-10 DIAGNOSIS — E11.42 POLYNEUROPATHY DUE TO TYPE 2 DIABETES MELLITUS (H): ICD-10-CM

## 2024-05-10 DIAGNOSIS — I73.9 PERIPHERAL VASCULAR DISEASE (H): ICD-10-CM

## 2024-05-10 DIAGNOSIS — H10.33 ACUTE CONJUNCTIVITIS OF BOTH EYES, UNSPECIFIED ACUTE CONJUNCTIVITIS TYPE: ICD-10-CM

## 2024-05-10 DIAGNOSIS — F03.90 DEMENTIA, UNSPECIFIED DEMENTIA SEVERITY, UNSPECIFIED DEMENTIA TYPE, UNSPECIFIED WHETHER BEHAVIORAL, PSYCHOTIC, OR MOOD DISTURBANCE OR ANXIETY (H): ICD-10-CM

## 2024-05-10 DIAGNOSIS — L01.00 IMPETIGO: ICD-10-CM

## 2024-05-10 DIAGNOSIS — Z86.73 HISTORY OF CVA (CEREBROVASCULAR ACCIDENT): Primary | ICD-10-CM

## 2024-05-10 PROCEDURE — 99309 SBSQ NF CARE MODERATE MDM 30: CPT | Performed by: NURSE PRACTITIONER

## 2024-05-10 NOTE — LETTER
5/10/2024        RE: Ever Crane  [address] [facility] [address]        [facility] GERIATRIC SERVICE  Episodic/Acute/Follow-Up  [place] MRN: [id number]. Place of Service where encounter took place:  [facility] ([facility]) [4002]   Chief Complaint   Patient presents with     RECHECK    HPI: Ever Crane  is a 79 year old (1945), who is being seen today for an episodic care visit. Today's concern is:    Ever seen today on routine follow-up as he continues to reside on [facility], as a long-term care patient.  Few weeks ago, he returned from the hospital after CVA affecting the right upper and right lower extremity.    Today, he continues to have right-sided weakness, but it is very slight.  Something he has noticed as he fatigues very easily.  He does have some postnasal drip and a chronic cough which he feels like he is always had but he is not sure if it is worse.  He denies shortness of breath or wheezing.  He denies chest pain or palpitations.  He denies headaches or dizziness.  He denies any sinus pressure.  His eyes about the same, but his cupids bow has completely cleared up.  He denies any itching or changes to his lower extremities.    Past Medical and Surgical History reviewed in Epic today.  MEDICATIONS:  Current Outpatient Medications   Medication Sig Dispense Refill     acetaminophen (TYLENOL) 325 MG tablet Take 650 mg by mouth 2 times daily as needed for mild pain       acetaminophen (TYLENOL) 500 MG tablet Take 1,000 mg by mouth 2 times daily       albuterol (PROAIR HFA/PROVENTIL HFA/VENTOLIN HFA) 108 (90 Base) MCG/ACT inhaler Inhale 2 puffs into the lungs 2 times daily as needed       aspirin 81 MG EC tablet Take 1 tablet (81 mg) by mouth daily for 21 days and then stop.       cetirizine (ZYRTEC) 5 MG tablet Take 5 mg by mouth daily       clopidogrel (PLAVIX) 75 MG tablet Take 75 mg by mouth daily       dulaglutide (TRULICITY) 0.75 MG/0.5ML pen Inject 1.5 mg Subcutaneous every 7  days       furosemide (LASIX) 40 MG tablet Take 40 mg by mouth daily       gabapentin (NEURONTIN) 100 MG capsule Take 200 mg by mouth 2 times daily       glimepiride (AMARYL) 1 MG tablet Take 1 mg by mouth every morning (before breakfast)       hypromellose (ARTIFICIAL TEARS) 0.5 % SOLN ophthalmic solution Place 1 drop Into the left eye 4 times daily as needed for dry eyes       ketoconazole (NIZORAL) 2 % external shampoo Apply topically twice a week Mon and Fri.       losartan (COZAAR) 25 MG tablet Take 12.5 mg by mouth daily       naloxone (NARCAN) 0.4 MG/ML injection Inject 0.1-0.4 mg into the muscle once as needed for opioid reversal       omeprazole (PRILOSEC) 20 MG CR capsule Take 20 mg by mouth daily       rivaroxaban ANTICOAGULANT (XARELTO) 15 MG TABS tablet Take 20 mg by mouth daily       simvastatin (ZOCOR) 40 MG tablet Take 40 mg by mouth At Bedtime       spironolactone (ALDACTONE) 25 MG tablet Take 25 mg by mouth daily       tamsulosin (FLOMAX) 0.4 MG capsule Take 0.4 mg by mouth daily       Objective: /72   Pulse 81   Temp 97.9  F (36.6  C)   Resp 18   Wt 86.2 kg (190 lb 1.6 oz)   SpO2 96%   BMI 27.28 kg/m    Exam:  GENERAL APPEARANCE: Alert, in no distress, cooperative.   ENT: Left eye still has residual redness w/ left lower lid ectropion, unchanged. Cupid's bow improved.   RESP: Respiratory effort good, no respiratory distress, Lung sounds clear. On RA.   CV: Edema trace BLE.  Skin:  Right heel:    Left lower leg, lateral:    PSYCH: Insight, judgement, and memory are baseline impaired, affect and mood are happy/engaged, tired.    Labs: Labs done in facility are in EPIC. Please refer to them using [employer]/Care Everywhere.    ASSESSMENT/PLAN:  History of CVA (cerebrovascular accident)  Dementia, unspecified dementia severity, unspecified dementia type, unspecified whether behavioral, psychotic, or mood disturbance or anxiety (H)  Polyneuropathy due to type 2 diabetes mellitus (H)  Peripheral  vascular disease (H24)  Acute conjunctivitis of both eyes, unspecified acute conjunctivitis type  Impetigo  Acute on chronic. Ongoing.  Provider reviewed records from facility, and interpreted most recent imaging/lab work and vital signs (such as weights, , each with their own characteristics requiring MDM.  Lower extremities as pictured above and managed by wound care team in facility.  Will defer to them.  Pending appointment with [facility], unclear when this is scheduled for. Left eye ectropion is not bothersome and Ever is cleaning this.   Post CVA, PT working w/ Ever. He continues to bear weight and walk without shoes in pink basin/buckets. Despite our counseling. It's recommended he have wheelchair level for mobilizing.   No diet or weight concerns. No edema ongoing.  Discontinue daily weights.   Follow up w/in 1 week or as needed.    Orders:  Discontinue daily wts.     Electronically signed by:  Dr. Yanna Dozier, KEVIN CNP [facility]        Sincerely,        KEVIN Bansal CNP

## 2024-05-17 ENCOUNTER — TRANSITIONAL CARE UNIT VISIT (OUTPATIENT)
Dept: GERIATRICS | Facility: CLINIC | Age: 79
End: 2024-05-17
Payer: MEDICARE

## 2024-05-17 ENCOUNTER — LAB REQUISITION (OUTPATIENT)
Dept: LAB | Facility: CLINIC | Age: 79
End: 2024-05-17
Payer: MEDICARE

## 2024-05-17 VITALS
RESPIRATION RATE: 16 BRPM | HEART RATE: 58 BPM | BODY MASS INDEX: 27.1 KG/M2 | OXYGEN SATURATION: 94 % | DIASTOLIC BLOOD PRESSURE: 60 MMHG | TEMPERATURE: 99.9 F | WEIGHT: 188.9 LBS | SYSTOLIC BLOOD PRESSURE: 105 MMHG

## 2024-05-17 DIAGNOSIS — F03.90 DEMENTIA, UNSPECIFIED DEMENTIA SEVERITY, UNSPECIFIED DEMENTIA TYPE, UNSPECIFIED WHETHER BEHAVIORAL, PSYCHOTIC, OR MOOD DISTURBANCE OR ANXIETY (H): ICD-10-CM

## 2024-05-17 DIAGNOSIS — N39.0 URINARY TRACT INFECTION, SITE NOT SPECIFIED: ICD-10-CM

## 2024-05-17 DIAGNOSIS — R41.82 ALTERED MENTAL STATUS, UNSPECIFIED: ICD-10-CM

## 2024-05-17 DIAGNOSIS — I87.2 VENOUS STASIS DERMATITIS OF BOTH LOWER EXTREMITIES: ICD-10-CM

## 2024-05-17 DIAGNOSIS — D64.9 ANEMIA, UNSPECIFIED: ICD-10-CM

## 2024-05-17 DIAGNOSIS — E11.42 POLYNEUROPATHY DUE TO TYPE 2 DIABETES MELLITUS (H): ICD-10-CM

## 2024-05-17 DIAGNOSIS — R50.9 FUO (FEVER OF UNKNOWN ORIGIN): ICD-10-CM

## 2024-05-17 DIAGNOSIS — R53.1 RIGHT SIDED WEAKNESS: ICD-10-CM

## 2024-05-17 DIAGNOSIS — Z86.73 HISTORY OF CVA (CEREBROVASCULAR ACCIDENT): Primary | ICD-10-CM

## 2024-05-17 DIAGNOSIS — I73.9 PERIPHERAL VASCULAR DISEASE (H): ICD-10-CM

## 2024-05-17 LAB
ANION GAP SERPL CALCULATED.3IONS-SCNC: 16 MMOL/L (ref 7–15)
BASOPHILS # BLD AUTO: 0 10E3/UL (ref 0–0.2)
BASOPHILS NFR BLD AUTO: 0 %
BUN SERPL-MCNC: 40.4 MG/DL (ref 8–23)
CALCIUM SERPL-MCNC: 9.3 MG/DL (ref 8.8–10.2)
CHLORIDE SERPL-SCNC: 99 MMOL/L (ref 98–107)
CREAT SERPL-MCNC: 1.67 MG/DL (ref 0.67–1.17)
DEPRECATED HCO3 PLAS-SCNC: 20 MMOL/L (ref 22–29)
EGFRCR SERPLBLD CKD-EPI 2021: 41 ML/MIN/1.73M2
EOSINOPHIL # BLD AUTO: 0.9 10E3/UL (ref 0–0.7)
EOSINOPHIL NFR BLD AUTO: 6 %
ERYTHROCYTE [DISTWIDTH] IN BLOOD BY AUTOMATED COUNT: 14.6 % (ref 10–15)
HCT VFR BLD AUTO: 45.6 % (ref 40–53)
HGB BLD-MCNC: 15.2 G/DL (ref 13.3–17.7)
IMM GRANULOCYTES # BLD: 0.1 10E3/UL
IMM GRANULOCYTES NFR BLD: 0 %
LYMPHOCYTES # BLD AUTO: 1.5 10E3/UL (ref 0.8–5.3)
LYMPHOCYTES NFR BLD AUTO: 11 %
MCH RBC QN AUTO: 28.2 PG (ref 26.5–33)
MCHC RBC AUTO-ENTMCNC: 33.3 G/DL (ref 31.5–36.5)
MCV RBC AUTO: 85 FL (ref 78–100)
MONOCYTES # BLD AUTO: 1.3 10E3/UL (ref 0–1.3)
MONOCYTES NFR BLD AUTO: 9 %
NEUTROPHILS # BLD AUTO: 10.7 10E3/UL (ref 1.6–8.3)
NEUTROPHILS NFR BLD AUTO: 74 %
NRBC # BLD AUTO: 0 10E3/UL
NRBC BLD AUTO-RTO: 0 /100
PLATELET # BLD AUTO: 229 10E3/UL (ref 150–450)
POTASSIUM SERPL-SCNC: 4.6 MMOL/L (ref 3.4–5.3)
RBC # BLD AUTO: 5.39 10E6/UL (ref 4.4–5.9)
SODIUM SERPL-SCNC: 135 MMOL/L (ref 135–145)
WBC # BLD AUTO: 14.6 10E3/UL (ref 4–11)

## 2024-05-17 PROCEDURE — 99310 SBSQ NF CARE HIGH MDM 45: CPT | Performed by: NURSE PRACTITIONER

## 2024-05-17 PROCEDURE — 81003 URINALYSIS AUTO W/O SCOPE: CPT | Mod: ORL | Performed by: FAMILY MEDICINE

## 2024-05-17 PROCEDURE — 85025 COMPLETE CBC W/AUTO DIFF WBC: CPT | Mod: ORL | Performed by: NURSE PRACTITIONER

## 2024-05-17 PROCEDURE — 82565 ASSAY OF CREATININE: CPT | Mod: ORL | Performed by: NURSE PRACTITIONER

## 2024-05-17 NOTE — PROGRESS NOTES
[facility]  Episodic/Acute/Follow-Up  [place] MRN: [id number]. Place of Service where encounter took place:  [facility] ([facility]) [4002]   Chief Complaint   Patient presents with    RECHECK    HPI: Ever Crane  is a 79 year old (1945), who is being seen today for an episodic care visit. Today's concern is:    Ever seen today on routine follow-up as he continues to be cared for on [facility] as a long-term care patient.  Today, he says he does not feel very good.  Since last night, he reports having an upset stomach a poor appetite.  He says he has been a little bit nauseous and threw up this morning.  He denies any constipation or diarrhea.  He does not feel short of breath, denies headaches or dizziness, and does not feel like he has a fever.  He is tired, and he feels like he needs more help than usual.    Past Medical and Surgical History reviewed in Epic today.  MEDICATIONS:  Current Outpatient Medications   Medication Sig Dispense Refill    acetaminophen (TYLENOL) 325 MG tablet Take 650 mg by mouth 2 times daily as needed for mild pain      acetaminophen (TYLENOL) 500 MG tablet Take 1,000 mg by mouth 2 times daily      albuterol (PROAIR HFA/PROVENTIL HFA/VENTOLIN HFA) 108 (90 Base) MCG/ACT inhaler Inhale 2 puffs into the lungs 2 times daily as needed      cetirizine (ZYRTEC) 5 MG tablet Take 5 mg by mouth daily      clopidogrel (PLAVIX) 75 MG tablet Take 75 mg by mouth daily      dulaglutide (TRULICITY) 0.75 MG/0.5ML pen Inject 1.5 mg Subcutaneous every 7 days      furosemide (LASIX) 40 MG tablet Take 40 mg by mouth daily      gabapentin (NEURONTIN) 100 MG capsule Take 200 mg by mouth 2 times daily      glimepiride (AMARYL) 1 MG tablet Take 1 mg by mouth every morning (before breakfast)      hypromellose (ARTIFICIAL TEARS) 0.5 % SOLN ophthalmic solution Place 1 drop Into the left eye 4 times daily as needed for dry eyes      ketoconazole (NIZORAL) 2 % external shampoo Apply topically  twice a week Mon and Fri.      losartan (COZAAR) 25 MG tablet Take 12.5 mg by mouth daily      naloxone (NARCAN) 0.4 MG/ML injection Inject 0.1-0.4 mg into the muscle once as needed for opioid reversal      omeprazole (PRILOSEC) 20 MG CR capsule Take 20 mg by mouth daily      rivaroxaban ANTICOAGULANT (XARELTO) 15 MG TABS tablet Take 20 mg by mouth daily      simvastatin (ZOCOR) 40 MG tablet Take 40 mg by mouth At Bedtime      spironolactone (ALDACTONE) 25 MG tablet Take 25 mg by mouth daily      tamsulosin (FLOMAX) 0.4 MG capsule Take 0.4 mg by mouth daily       Objective: /60   Pulse 58   Temp 99.9  F (37.7  C)   Resp 16   Wt 85.7 kg (188 lb 14.4 oz)   SpO2 94%   BMI 27.10 kg/m    Exam:  GENERAL APPEARANCE: fatigued, in no distress, cooperative. Left eye looking better. Cupid's bow impetigo resolved.  RESP: Respiratory effort good, no respiratory distress, Lung sounds clear/diminished. On RA.   CV: Auscultation of heart reveals S1, S2, rate controlled and rhythm irregular, no murmur, no rub or gallop, Edema 1+ BLE. Peripheral pulses are 2+.  Skin:   PSYCH: Insight, judgement, and memory are impaired at baseline, affect and mood are tired/pleasant.    Labs: Labs done in facility are in EPIC. Please refer to them using [facility]/Care Everywhere.    ASSESSMENT/PLAN:  History of CVA (cerebrovascular accident)  Right sided weakness  Dementia, unspecified dementia severity, unspecified dementia type, unspecified whether behavioral, psychotic, or mood disturbance or anxiety (H)  Polyneuropathy due to type 2 diabetes mellitus (H)  Peripheral vascular disease (H24)  Venous stasis dermatitis of both lower extremities  Fever of Unknown Origin   Acute on chronic. Tenuous  Provider reviewed records from facility, and interpreted most recent imaging/lab work (such as CBC, BMP), and vital signs, each with their own characteristics requiring MDM.  Provider coordinated care with nursing who obtained vital signs.  Noting  mildly febrile, upset stomach, nausea, poor appetite.  Neurologically at baseline since his previous CVA, and on preventative measures for this.  Suspect viral illness.  Will obtain CBC with differential.  Low suspicion for respiratory illness, but will rule out COVID-19.  Noting poor hygiene and some likely dehydration.  Will rule out UTI  With UA/UC.  Will ask nursing to assist fluid push as noted below.  Will treat nausea which will hopefully be temporary with Zofran as noted below.  Tylenol and CIERRA available for comfort.  Follow up w/in 1 week or as needed.    Orders:  CBC w/ diff, BMP x1 STAT.  UA/UC x1 stat via straight cath or clean catch.  VS Qshift x 3 days.  Push fluids, 8oz PO Qshift x 3 days.  Zofran 4mg PO TID PRN x 3 days.  COVID swab x1, rapid.   Dx: fever.    Electronically signed by:  Dr. Yanna Dozier, APRN CNP DNP

## 2024-05-17 NOTE — LETTER
5/17/2024        RE: Ever Crane  [address] [facility] [address]        [facility] GERIATRIC SERVICE  Episodic/Acute/Follow-Up  [place] MRN: [id number]. Place of Service where encounter took place:  [facility] ([facility]) [4002]   Chief Complaint   Patient presents with     RECHECK    HPI: Ever Crane  is a 79 year old (1945), who is being seen today for an episodic care visit. Today's concern is:    Ever seen today on routine follow-up as he continues to be cared for on [facility] as a long-term care patient.  Today, he says he does not feel very good.  Since last night, he reports having an upset stomach a poor appetite.  He says he has been a little bit nauseous and threw up this morning.  He denies any constipation or diarrhea.  He does not feel short of breath, denies headaches or dizziness, and does not feel like he has a fever.  He is tired, and he feels like he needs more help than usual.    Past Medical and Surgical History reviewed in Epic today.  MEDICATIONS:  Current Outpatient Medications   Medication Sig Dispense Refill     acetaminophen (TYLENOL) 325 MG tablet Take 650 mg by mouth 2 times daily as needed for mild pain       acetaminophen (TYLENOL) 500 MG tablet Take 1,000 mg by mouth 2 times daily       albuterol (PROAIR HFA/PROVENTIL HFA/VENTOLIN HFA) 108 (90 Base) MCG/ACT inhaler Inhale 2 puffs into the lungs 2 times daily as needed       cetirizine (ZYRTEC) 5 MG tablet Take 5 mg by mouth daily       clopidogrel (PLAVIX) 75 MG tablet Take 75 mg by mouth daily       dulaglutide (TRULICITY) 0.75 MG/0.5ML pen Inject 1.5 mg Subcutaneous every 7 days       furosemide (LASIX) 40 MG tablet Take 40 mg by mouth daily       gabapentin (NEURONTIN) 100 MG capsule Take 200 mg by mouth 2 times daily       glimepiride (AMARYL) 1 MG tablet Take 1 mg by mouth every morning (before breakfast)       hypromellose (ARTIFICIAL TEARS) 0.5 % SOLN ophthalmic solution Place 1 drop Into the left eye 4  times daily as needed for dry eyes       ketoconazole (NIZORAL) 2 % external shampoo Apply topically twice a week Mon and Fri.       losartan (COZAAR) 25 MG tablet Take 12.5 mg by mouth daily       naloxone (NARCAN) 0.4 MG/ML injection Inject 0.1-0.4 mg into the muscle once as needed for opioid reversal       omeprazole (PRILOSEC) 20 MG CR capsule Take 20 mg by mouth daily       rivaroxaban ANTICOAGULANT (XARELTO) 15 MG TABS tablet Take 20 mg by mouth daily       simvastatin (ZOCOR) 40 MG tablet Take 40 mg by mouth At Bedtime       spironolactone (ALDACTONE) 25 MG tablet Take 25 mg by mouth daily       tamsulosin (FLOMAX) 0.4 MG capsule Take 0.4 mg by mouth daily       Objective: /60   Pulse 58   Temp 99.9  F (37.7  C)   Resp 16   Wt 85.7 kg (188 lb 14.4 oz)   SpO2 94%   BMI 27.10 kg/m    Exam:  GENERAL APPEARANCE: fatigued, in no distress, cooperative. Left eye looking better. Cupid's bow impetigo resolved.  RESP: Respiratory effort good, no respiratory distress, Lung sounds clear/diminished. On RA.   CV: Auscultation of heart reveals S1, S2, rate controlled and rhythm irregular, no murmur, no rub or gallop, Edema 1+ BLE. Peripheral pulses are 2+.  Skin:   PSYCH: Insight, judgement, and memory are impaired at baseline, affect and mood are tired/pleasant.    Labs: Labs done in facility are in EPIC. Please refer to them using [email or web address] Everywhere.    ASSESSMENT/PLAN:  History of CVA (cerebrovascular accident)  Right sided weakness  Dementia, unspecified dementia severity, unspecified dementia type, unspecified whether behavioral, psychotic, or mood disturbance or anxiety (H)  Polyneuropathy due to type 2 diabetes mellitus (H)  Peripheral vascular disease (H24)  Venous stasis dermatitis of both lower extremities  Fever of Unknown Origin   Acute on chronic. Tenuous  Provider reviewed records from facility, and interpreted most recent imaging/lab work (such as CBC, BMP), and vital signs, each with their own  characteristics requiring MDM.  Provider coordinated care with nursing who obtained vital signs.  Noting mildly febrile, upset stomach, nausea, poor appetite.  Neurologically at baseline since his previous CVA, and on preventative measures for this.  Suspect viral illness.  Will obtain CBC with differential.  Low suspicion for respiratory illness, but will rule out COVID-19.  Noting poor hygiene and some likely dehydration.  Will rule out UTI  With UA/UC.  Will ask nursing to assist fluid push as noted below.  Will treat nausea which will hopefully be temporary with Zofran as noted below.  Tylenol and CIERRA available for comfort.  Follow up w/in 1 week or as needed.    Orders:  CBC w/ diff, BMP x1 STAT.  UA/UC x1 stat via straight cath or clean catch.  VS Qshift x 3 days.  Push fluids, 8oz PO Qshift x 3 days.  Zofran 4mg PO TID PRN x 3 days.  COVID swab x1, rapid.   Dx: fever.    Electronically signed by:  Dr. Yanna Dozier, KEVIN CNP DNP        Sincerely,        KEVIN Bansal CNP

## 2024-05-18 ENCOUNTER — TELEPHONE (OUTPATIENT)
Dept: GERIATRICS | Facility: CLINIC | Age: 79
End: 2024-05-18
Payer: MEDICARE

## 2024-05-18 LAB
ALBUMIN UR-MCNC: NEGATIVE MG/DL
APPEARANCE UR: CLEAR
BILIRUB UR QL STRIP: NEGATIVE
COLOR UR AUTO: YELLOW
GLUCOSE UR STRIP-MCNC: NEGATIVE MG/DL
HGB UR QL STRIP: NEGATIVE
KETONES UR STRIP-MCNC: NEGATIVE MG/DL
LEUKOCYTE ESTERASE UR QL STRIP: NEGATIVE
NITRATE UR QL: NEGATIVE
PH UR STRIP: 5 [PH] (ref 5–7)
SP GR UR STRIP: 1.02 (ref 1–1.03)
UROBILINOGEN UR STRIP-MCNC: NORMAL MG/DL

## 2024-05-18 NOTE — TELEPHONE ENCOUNTER
Labs came back from yesterday and UA/UC was just sent in today and no UA back yet.    Lab Results   Component Value Date    WBC 14.6 05/17/2024       Decision made to start keflex 500mg po TID x4 days  CBC and BMP again on Monday due to leukocytosis  CXR AP and lateral view due to Leukocytosis.    KEVIN Vela CNP

## 2024-05-19 ENCOUNTER — LAB REQUISITION (OUTPATIENT)
Dept: LAB | Facility: CLINIC | Age: 79
End: 2024-05-19
Payer: MEDICARE

## 2024-05-19 DIAGNOSIS — R78.81 BACTEREMIA: ICD-10-CM

## 2024-05-20 ENCOUNTER — TRANSITIONAL CARE UNIT VISIT (OUTPATIENT)
Dept: GERIATRICS | Facility: CLINIC | Age: 79
End: 2024-05-20
Payer: MEDICARE

## 2024-05-20 VITALS
RESPIRATION RATE: 17 BRPM | HEART RATE: 68 BPM | WEIGHT: 188.9 LBS | OXYGEN SATURATION: 98 % | DIASTOLIC BLOOD PRESSURE: 98 MMHG | BODY MASS INDEX: 27.1 KG/M2 | TEMPERATURE: 96.9 F | SYSTOLIC BLOOD PRESSURE: 164 MMHG

## 2024-05-20 DIAGNOSIS — Z86.73 HISTORY OF CVA (CEREBROVASCULAR ACCIDENT): ICD-10-CM

## 2024-05-20 DIAGNOSIS — R50.9 FEVER OF UNKNOWN ORIGIN (FUO): Primary | ICD-10-CM

## 2024-05-20 DIAGNOSIS — R11.0 NAUSEA: ICD-10-CM

## 2024-05-20 DIAGNOSIS — R53.1 RIGHT SIDED WEAKNESS: ICD-10-CM

## 2024-05-20 DIAGNOSIS — F03.90 DEMENTIA, UNSPECIFIED DEMENTIA SEVERITY, UNSPECIFIED DEMENTIA TYPE, UNSPECIFIED WHETHER BEHAVIORAL, PSYCHOTIC, OR MOOD DISTURBANCE OR ANXIETY (H): ICD-10-CM

## 2024-05-20 DIAGNOSIS — D72.829 LEUKOCYTOSIS, UNSPECIFIED TYPE: ICD-10-CM

## 2024-05-20 LAB
ANION GAP SERPL CALCULATED.3IONS-SCNC: 14 MMOL/L (ref 7–15)
BASOPHILS # BLD AUTO: 0.1 10E3/UL (ref 0–0.2)
BASOPHILS NFR BLD AUTO: 1 %
BUN SERPL-MCNC: 39 MG/DL (ref 8–23)
CALCIUM SERPL-MCNC: 10 MG/DL (ref 8.8–10.2)
CHLORIDE SERPL-SCNC: 101 MMOL/L (ref 98–107)
CREAT SERPL-MCNC: 1.56 MG/DL (ref 0.67–1.17)
DEPRECATED HCO3 PLAS-SCNC: 22 MMOL/L (ref 22–29)
EGFRCR SERPLBLD CKD-EPI 2021: 45 ML/MIN/1.73M2
EOSINOPHIL # BLD AUTO: 1.2 10E3/UL (ref 0–0.7)
EOSINOPHIL NFR BLD AUTO: 13 %
ERYTHROCYTE [DISTWIDTH] IN BLOOD BY AUTOMATED COUNT: 14.6 % (ref 10–15)
GLUCOSE SERPL-MCNC: 114 MG/DL (ref 70–99)
HCT VFR BLD AUTO: 44.2 % (ref 40–53)
HGB BLD-MCNC: 14.4 G/DL (ref 13.3–17.7)
IMM GRANULOCYTES # BLD: 0 10E3/UL
IMM GRANULOCYTES NFR BLD: 0 %
LYMPHOCYTES # BLD AUTO: 1.5 10E3/UL (ref 0.8–5.3)
LYMPHOCYTES NFR BLD AUTO: 16 %
MCH RBC QN AUTO: 28 PG (ref 26.5–33)
MCHC RBC AUTO-ENTMCNC: 32.6 G/DL (ref 31.5–36.5)
MCV RBC AUTO: 86 FL (ref 78–100)
MONOCYTES # BLD AUTO: 0.5 10E3/UL (ref 0–1.3)
MONOCYTES NFR BLD AUTO: 6 %
NEUTROPHILS # BLD AUTO: 5.7 10E3/UL (ref 1.6–8.3)
NEUTROPHILS NFR BLD AUTO: 64 %
NRBC # BLD AUTO: 0 10E3/UL
NRBC BLD AUTO-RTO: 0 /100
PLATELET # BLD AUTO: 225 10E3/UL (ref 150–450)
POTASSIUM SERPL-SCNC: 4.4 MMOL/L (ref 3.4–5.3)
RBC # BLD AUTO: 5.14 10E6/UL (ref 4.4–5.9)
SODIUM SERPL-SCNC: 137 MMOL/L (ref 135–145)
WBC # BLD AUTO: 8.9 10E3/UL (ref 4–11)

## 2024-05-20 PROCEDURE — 36415 COLL VENOUS BLD VENIPUNCTURE: CPT | Mod: ORL | Performed by: NURSE PRACTITIONER

## 2024-05-20 PROCEDURE — P9604 ONE-WAY ALLOW PRORATED TRIP: HCPCS | Mod: ORL | Performed by: NURSE PRACTITIONER

## 2024-05-20 PROCEDURE — 99309 SBSQ NF CARE MODERATE MDM 30: CPT | Performed by: NURSE PRACTITIONER

## 2024-05-20 PROCEDURE — 80048 BASIC METABOLIC PNL TOTAL CA: CPT | Mod: ORL | Performed by: NURSE PRACTITIONER

## 2024-05-20 PROCEDURE — 85025 COMPLETE CBC W/AUTO DIFF WBC: CPT | Mod: ORL | Performed by: NURSE PRACTITIONER

## 2024-05-20 NOTE — PROGRESS NOTES
[facility]  Episodic/Acute/Follow-Up  [place] MRN: [id number]. Place of Service where encounter took place:  [facility] ([facility]) [4002]   Chief Complaint   Patient presents with    RECHECK    HPI: Ever Crane  is a 79 year old (1945), who is being seen today for an episodic care visit. Today's concern is:    Ever seen today on routine follow-up as he continues to reside on [facility], though he is a long-term care patient.  Last week, he developed a low-grade fever, fatigue, lethargy, and some very indiscriminate clinical signs and symptoms of infection.  At the time, UA/UC was collected, stat blood work was collected.  While UA/UC was negative, leukocytosis was found with positive neutrophil response.  Over the weekend, chest x-ray was obtained in effort to continue to investigate infectious source.  Keflex was started on a short course in the interim.    Today, Ever says that he is feeling better and is on day 2 of antibiotic dosing.  His eye feels pretty good, he denies a cough or shortness of breath.  He is not having a sore throat or nasal drainage.  He denies chest pain or palpitations.  He remembers being nauseous, but says that this is gone and his appetite is better.  He denies any constipation or diarrhea.  He thinks his legs are looking better, and then shows them to me.    Past Medical and Surgical History reviewed in Epic today.  MEDICATIONS:  Current Outpatient Medications   Medication Sig Dispense Refill    acetaminophen (TYLENOL) 325 MG tablet Take 650 mg by mouth 2 times daily as needed for mild pain      acetaminophen (TYLENOL) 500 MG tablet Take 1,000 mg by mouth 2 times daily      albuterol (PROAIR HFA/PROVENTIL HFA/VENTOLIN HFA) 108 (90 Base) MCG/ACT inhaler Inhale 2 puffs into the lungs 2 times daily as needed      cetirizine (ZYRTEC) 5 MG tablet Take 5 mg by mouth daily      clopidogrel (PLAVIX) 75 MG tablet Take 75 mg by mouth daily      dulaglutide (TRULICITY)  0.75 MG/0.5ML pen Inject 1.5 mg Subcutaneous every 7 days      furosemide (LASIX) 40 MG tablet Take 40 mg by mouth daily      gabapentin (NEURONTIN) 100 MG capsule Take 200 mg by mouth 2 times daily      glimepiride (AMARYL) 1 MG tablet Take 1 mg by mouth every morning (before breakfast)      hypromellose (ARTIFICIAL TEARS) 0.5 % SOLN ophthalmic solution Place 1 drop Into the left eye 4 times daily as needed for dry eyes      ketoconazole (NIZORAL) 2 % external shampoo Apply topically twice a week Mon and Fri.      losartan (COZAAR) 25 MG tablet Take 12.5 mg by mouth daily      naloxone (NARCAN) 0.4 MG/ML injection Inject 0.1-0.4 mg into the muscle once as needed for opioid reversal      omeprazole (PRILOSEC) 20 MG CR capsule Take 20 mg by mouth daily      rivaroxaban ANTICOAGULANT (XARELTO) 15 MG TABS tablet Take 20 mg by mouth daily      simvastatin (ZOCOR) 40 MG tablet Take 40 mg by mouth At Bedtime      spironolactone (ALDACTONE) 25 MG tablet Take 25 mg by mouth daily      tamsulosin (FLOMAX) 0.4 MG capsule Take 0.4 mg by mouth daily       Objective: BP (!) 164/98   Pulse 68   Temp 96.9  F (36.1  C)   Resp 17   Wt 85.7 kg (188 lb 14.4 oz)   SpO2 98%   BMI 27.10 kg/m    Exam:  GENERAL APPEARANCE: Alert, in no distress, cooperative.   ENT: left lower lid ectropion remains present.   RESP: Respiratory effort good, no respiratory distress, Lung sounds clear. On RA.   CV: Auscultation of heart reveals S1, S2, rate controlled and rhythm irregular, no murmur, no rub or gallop, Edema 0+ BLE. Peripheral pulses are 2+.  PSYCH: Insight, judgement, and memory are baseline impaired, affect and mood are happy/engaged.    Labs: Labs done in facility are in EPIC. Please refer to them using [facility]/Care Everywhere.    ASSESSMENT/PLAN:  Fever of unknown origin (FUO)  Leukocytosis, unspecified type  History of CVA (cerebrovascular accident)  Right sided weakness  Dementia, unspecified dementia severity, unspecified dementia  type, unspecified whether behavioral, psychotic, or mood disturbance or anxiety (H)  Nausea  Acute on chronic. Ongoing.  Provider reviewed records from facility, and interpreted most recent imaging/lab work (such as CBC, specifically WBC, UA, and CXR), and vital signs, each with their own characteristics requiring MDM.  UA completed and was unrevealing, therefore culture was not completed.  No UTI present.  Chest x-ray completed and is without infiltrate or fluid retention.  No pneumonia, CHF exacerbation.  Leukocytosis had been present, but repeat blood work today shows that this has resolved.  Fever had been present, though this has been monitored closely over the weekend and also has resolved.  No evidence of new skin eruption, though previous issues with lower extremity cellulitis have caused significant infection before.  Wound care team continuing to monitor lower extremities.  No new signs or symptoms of clinical compromise here.  Ever is clinically improved on a couple days of antibiotics.  However, we do not know exactly what we are treating.  Noting neutrophilic leukocytosis is most indicative of a bacterial infection.  Therefore will continue Keflex dosing for a full course knowing Ever's history and his fragility.  Noting eosinophilic component, continue Zyrtec.  Follow-up with [place] [facility] as already directed in my previous notes.  Nausea resolved, and okay to discontinue temporary Zofran.  Follow up w/in 1 week or as needed.    Orders:  Extend Keflex to 500mg PO TID x 7 days total. Dx: infection.    Electronically signed by:  Dr. Yanna Dozier, KEVIN CNP DNP

## 2024-05-20 NOTE — LETTER
5/20/2024        RE: Ever Crane  [address] [facility] [address]        [facility]IATRIC SERVICE  Episodic/Acute/Follow-Up  [place] MRN: [id number]. Place of Service where encounter took place:  [facility] ([facility]) [4002]   Chief Complaint   Patient presents with     RECHECK    HPI: Ever Crane  is a 79 year old (1945), who is being seen today for an episodic care visit. Today's concern is:    Ever seen today on routine follow-up as he continues to reside on [facility], though he is a long-term care patient.  Last week, he developed a low-grade fever, fatigue, lethargy, and some very indiscriminate clinical signs and symptoms of infection.  At the time, UA/UC was collected, stat blood work was collected.  While UA/UC was negative, leukocytosis was found with positive neutrophil response.  Over the weekend, chest x-ray was obtained in effort to continue to investigate infectious source.  Keflex was started on a short course in the interim.    Today, Ever says that he is feeling better and is on day 2 of antibiotic dosing.  His eye feels pretty good, he denies a cough or shortness of breath.  He is not having a sore throat or nasal drainage.  He denies chest pain or palpitations.  He remembers being nauseous, but says that this is gone and his appetite is better.  He denies any constipation or diarrhea.  He thinks his legs are looking better, and then shows them to me.    Past Medical and Surgical History reviewed in Epic today.  MEDICATIONS:  Current Outpatient Medications   Medication Sig Dispense Refill     acetaminophen (TYLENOL) 325 MG tablet Take 650 mg by mouth 2 times daily as needed for mild pain       acetaminophen (TYLENOL) 500 MG tablet Take 1,000 mg by mouth 2 times daily       albuterol (PROAIR HFA/PROVENTIL HFA/VENTOLIN HFA) 108 (90 Base) MCG/ACT inhaler Inhale 2 puffs into the lungs 2 times daily as needed       cetirizine (ZYRTEC) 5 MG tablet Take 5 mg by mouth daily        clopidogrel (PLAVIX) 75 MG tablet Take 75 mg by mouth daily       dulaglutide (TRULICITY) 0.75 MG/0.5ML pen Inject 1.5 mg Subcutaneous every 7 days       furosemide (LASIX) 40 MG tablet Take 40 mg by mouth daily       gabapentin (NEURONTIN) 100 MG capsule Take 200 mg by mouth 2 times daily       glimepiride (AMARYL) 1 MG tablet Take 1 mg by mouth every morning (before breakfast)       hypromellose (ARTIFICIAL TEARS) 0.5 % SOLN ophthalmic solution Place 1 drop Into the left eye 4 times daily as needed for dry eyes       ketoconazole (NIZORAL) 2 % external shampoo Apply topically twice a week Mon and Fri.       losartan (COZAAR) 25 MG tablet Take 12.5 mg by mouth daily       naloxone (NARCAN) 0.4 MG/ML injection Inject 0.1-0.4 mg into the muscle once as needed for opioid reversal       omeprazole (PRILOSEC) 20 MG CR capsule Take 20 mg by mouth daily       rivaroxaban ANTICOAGULANT (XARELTO) 15 MG TABS tablet Take 20 mg by mouth daily       simvastatin (ZOCOR) 40 MG tablet Take 40 mg by mouth At Bedtime       spironolactone (ALDACTONE) 25 MG tablet Take 25 mg by mouth daily       tamsulosin (FLOMAX) 0.4 MG capsule Take 0.4 mg by mouth daily       Objective: BP (!) 164/98   Pulse 68   Temp 96.9  F (36.1  C)   Resp 17   Wt 85.7 kg (188 lb 14.4 oz)   SpO2 98%   BMI 27.10 kg/m    Exam:  GENERAL APPEARANCE: Alert, in no distress, cooperative.   ENT: left lower lid ectropion remains present.   RESP: Respiratory effort good, no respiratory distress, Lung sounds clear. On RA.   CV: Auscultation of heart reveals S1, S2, rate controlled and rhythm irregular, no murmur, no rub or gallop, Edema 0+ BLE. Peripheral pulses are 2+.  PSYCH: Insight, judgement, and memory are baseline impaired, affect and mood are happy/engaged.    Labs: Labs done in facility are in EPIC. Please refer to them using [employer]/Care Everywhere.    ASSESSMENT/PLAN:  Fever of unknown origin (FUO)  Leukocytosis, unspecified type  History of CVA  (cerebrovascular accident)  Right sided weakness  Dementia, unspecified dementia severity, unspecified dementia type, unspecified whether behavioral, psychotic, or mood disturbance or anxiety (H)  Nausea  Acute on chronic. Ongoing.  Provider reviewed records from facility, and interpreted most recent imaging/lab work (such as CBC, specifically WBC, UA, and CXR), and vital signs, each with their own characteristics requiring MDM.  UA completed and was unrevealing, therefore culture was not completed.  No UTI present.  Chest x-ray completed and is without infiltrate or fluid retention.  No pneumonia, CHF exacerbation.  Leukocytosis had been present, but repeat blood work today shows that this has resolved.  Fever had been present, though this has been monitored closely over the weekend and also has resolved.  No evidence of new skin eruption, though previous issues with lower extremity cellulitis have caused significant infection before.  Wound care team continuing to monitor lower extremities.  No new signs or symptoms of clinical compromise here.  Ever is clinically improved on a couple days of antibiotics.  However, we do not know exactly what we are treating.  Noting neutrophilic leukocytosis is most indicative of a bacterial infection.  Therefore will continue Keflex dosing for a full course knowing Ever's history and his fragility.  Noting eosinophilic component, continue Zyrtec.  Follow-up with [place] [facility] as already directed in my previous notes.  Nausea resolved, and okay to discontinue temporary Zofran.  Follow up w/in 1 week or as needed.    Orders:  Extend Keflex to 500mg PO TID x 7 days total. Dx: infection.    Electronically signed by:  KEVIN Wen CNP DNP        Sincerely,        KEVIN Bansal CNP

## 2024-05-22 ENCOUNTER — DOCUMENTATION ONLY (OUTPATIENT)
Dept: GERIATRICS | Facility: CLINIC | Age: 79
End: 2024-05-22
Payer: MEDICARE

## 2024-05-29 ENCOUNTER — NURSING HOME VISIT (OUTPATIENT)
Dept: GERIATRICS | Facility: CLINIC | Age: 79
End: 2024-05-29
Payer: MEDICARE

## 2024-05-29 ENCOUNTER — APPOINTMENT (OUTPATIENT)
Dept: GENERAL RADIOLOGY | Facility: CLINIC | Age: 79
End: 2024-05-29
Attending: EMERGENCY MEDICINE
Payer: MEDICARE

## 2024-05-29 ENCOUNTER — HOSPITAL ENCOUNTER (EMERGENCY)
Facility: CLINIC | Age: 79
Discharge: HOME | End: 2024-05-29
Attending: EMERGENCY MEDICINE | Admitting: EMERGENCY MEDICINE
Payer: MEDICARE

## 2024-05-29 VITALS
DIASTOLIC BLOOD PRESSURE: 55 MMHG | OXYGEN SATURATION: 95 % | BODY MASS INDEX: 26.98 KG/M2 | RESPIRATION RATE: 9 BRPM | TEMPERATURE: 97.7 F | HEART RATE: 98 BPM | WEIGHT: 188 LBS | SYSTOLIC BLOOD PRESSURE: 117 MMHG

## 2024-05-29 VITALS
HEART RATE: 76 BPM | TEMPERATURE: 97.8 F | BODY MASS INDEX: 27.04 KG/M2 | OXYGEN SATURATION: 95 % | WEIGHT: 188.9 LBS | RESPIRATION RATE: 18 BRPM | HEIGHT: 70 IN | DIASTOLIC BLOOD PRESSURE: 69 MMHG | SYSTOLIC BLOOD PRESSURE: 128 MMHG

## 2024-05-29 DIAGNOSIS — R53.81 MALAISE AND FATIGUE: ICD-10-CM

## 2024-05-29 DIAGNOSIS — R53.83 OTHER FATIGUE: ICD-10-CM

## 2024-05-29 DIAGNOSIS — M79.602 PAIN IN BOTH UPPER EXTREMITIES: Primary | ICD-10-CM

## 2024-05-29 DIAGNOSIS — M79.601 PAIN IN BOTH UPPER EXTREMITIES: Primary | ICD-10-CM

## 2024-05-29 DIAGNOSIS — R19.7 DIARRHEA, UNSPECIFIED TYPE: ICD-10-CM

## 2024-05-29 DIAGNOSIS — R53.83 MALAISE AND FATIGUE: ICD-10-CM

## 2024-05-29 LAB
ALBUMIN SERPL BCG-MCNC: 4.2 G/DL (ref 3.5–5.2)
ALBUMIN UR-MCNC: NEGATIVE MG/DL
ALP SERPL-CCNC: 96 U/L (ref 40–150)
ALT SERPL W P-5'-P-CCNC: 8 U/L (ref 0–70)
ANION GAP SERPL CALCULATED.3IONS-SCNC: 14 MMOL/L (ref 7–15)
APPEARANCE UR: CLEAR
AST SERPL W P-5'-P-CCNC: 16 U/L (ref 0–45)
BASOPHILS # BLD AUTO: 0.1 10E3/UL (ref 0–0.2)
BASOPHILS NFR BLD AUTO: 1 %
BILIRUB SERPL-MCNC: 0.6 MG/DL
BILIRUB UR QL STRIP: NEGATIVE
BUN SERPL-MCNC: 29.1 MG/DL (ref 8–23)
CALCIUM SERPL-MCNC: 9.5 MG/DL (ref 8.8–10.2)
CHLORIDE SERPL-SCNC: 102 MMOL/L (ref 98–107)
COLOR UR AUTO: YELLOW
CREAT SERPL-MCNC: 1.58 MG/DL (ref 0.67–1.17)
DEPRECATED HCO3 PLAS-SCNC: 23 MMOL/L (ref 22–29)
EGFRCR SERPLBLD CKD-EPI 2021: 44 ML/MIN/1.73M2
EOSINOPHIL # BLD AUTO: 1.9 10E3/UL (ref 0–0.7)
EOSINOPHIL NFR BLD AUTO: 20 %
ERYTHROCYTE [DISTWIDTH] IN BLOOD BY AUTOMATED COUNT: 14.1 % (ref 10–15)
GLUCOSE SERPL-MCNC: 129 MG/DL (ref 70–99)
GLUCOSE UR STRIP-MCNC: NEGATIVE MG/DL
HCT VFR BLD AUTO: 41.5 % (ref 40–53)
HGB BLD-MCNC: 13.4 G/DL (ref 13.3–17.7)
HGB UR QL STRIP: NEGATIVE
HOLD SPECIMEN: NORMAL
HYALINE CASTS: 5 /LPF
IMM GRANULOCYTES # BLD: 0 10E3/UL
IMM GRANULOCYTES NFR BLD: 0 %
KETONES UR STRIP-MCNC: NEGATIVE MG/DL
LEUKOCYTE ESTERASE UR QL STRIP: NEGATIVE
LYMPHOCYTES # BLD AUTO: 1.6 10E3/UL (ref 0.8–5.3)
LYMPHOCYTES NFR BLD AUTO: 17 %
MCH RBC QN AUTO: 27.7 PG (ref 26.5–33)
MCHC RBC AUTO-ENTMCNC: 32.3 G/DL (ref 31.5–36.5)
MCV RBC AUTO: 86 FL (ref 78–100)
MONOCYTES # BLD AUTO: 0.5 10E3/UL (ref 0–1.3)
MONOCYTES NFR BLD AUTO: 5 %
MUCOUS THREADS #/AREA URNS LPF: PRESENT /LPF
NEUTROPHILS # BLD AUTO: 5.5 10E3/UL (ref 1.6–8.3)
NEUTROPHILS NFR BLD AUTO: 57 %
NITRATE UR QL: NEGATIVE
NRBC # BLD AUTO: 0 10E3/UL
NRBC BLD AUTO-RTO: 0 /100
PH UR STRIP: 5 [PH] (ref 5–7)
PLATELET # BLD AUTO: 210 10E3/UL (ref 150–450)
POTASSIUM SERPL-SCNC: 4.6 MMOL/L (ref 3.4–5.3)
PROT SERPL-MCNC: 7.8 G/DL (ref 6.4–8.3)
RBC # BLD AUTO: 4.83 10E6/UL (ref 4.4–5.9)
RBC URINE: <1 /HPF
SODIUM SERPL-SCNC: 139 MMOL/L (ref 135–145)
SP GR UR STRIP: 1.01 (ref 1–1.03)
TROPONIN T SERPL HS-MCNC: 22 NG/L
TROPONIN T SERPL HS-MCNC: 26 NG/L
UROBILINOGEN UR STRIP-MCNC: NORMAL MG/DL
WBC # BLD AUTO: 9.8 10E3/UL (ref 4–11)
WBC URINE: <1 /HPF

## 2024-05-29 PROCEDURE — 85048 AUTOMATED LEUKOCYTE COUNT: CPT | Performed by: EMERGENCY MEDICINE

## 2024-05-29 PROCEDURE — 99284 EMERGENCY DEPT VISIT MOD MDM: CPT | Performed by: EMERGENCY MEDICINE

## 2024-05-29 PROCEDURE — 36415 COLL VENOUS BLD VENIPUNCTURE: CPT | Performed by: EMERGENCY MEDICINE

## 2024-05-29 PROCEDURE — 99309 SBSQ NF CARE MODERATE MDM 30: CPT | Performed by: NURSE PRACTITIONER

## 2024-05-29 PROCEDURE — 84484 ASSAY OF TROPONIN QUANT: CPT | Performed by: EMERGENCY MEDICINE

## 2024-05-29 PROCEDURE — 99285 EMERGENCY DEPT VISIT HI MDM: CPT | Mod: 25 | Performed by: EMERGENCY MEDICINE

## 2024-05-29 PROCEDURE — 93010 ELECTROCARDIOGRAM REPORT: CPT | Performed by: EMERGENCY MEDICINE

## 2024-05-29 PROCEDURE — 93005 ELECTROCARDIOGRAM TRACING: CPT | Performed by: EMERGENCY MEDICINE

## 2024-05-29 PROCEDURE — 71046 X-RAY EXAM CHEST 2 VIEWS: CPT

## 2024-05-29 PROCEDURE — 80053 COMPREHEN METABOLIC PANEL: CPT | Performed by: EMERGENCY MEDICINE

## 2024-05-29 PROCEDURE — 81001 URINALYSIS AUTO W/SCOPE: CPT | Performed by: EMERGENCY MEDICINE

## 2024-05-29 ASSESSMENT — ACTIVITIES OF DAILY LIVING (ADL)
ADLS_ACUITY_SCORE: 39

## 2024-05-29 ASSESSMENT — COLUMBIA-SUICIDE SEVERITY RATING SCALE - C-SSRS
6. HAVE YOU EVER DONE ANYTHING, STARTED TO DO ANYTHING, OR PREPARED TO DO ANYTHING TO END YOUR LIFE?: NO
2. HAVE YOU ACTUALLY HAD ANY THOUGHTS OF KILLING YOURSELF IN THE PAST MONTH?: NO
1. IN THE PAST MONTH, HAVE YOU WISHED YOU WERE DEAD OR WISHED YOU COULD GO TO SLEEP AND NOT WAKE UP?: NO

## 2024-05-29 NOTE — PROGRESS NOTES
"[facility] GERIATRICS    Chief Complaint   Patient presents with    Nursing Home Acute     HPI:  Ever Crane is a 79 year old  (1945), who is being seen today for an episodic care visit at: [facility] () [[id number]]. Today's concern is:   Patient resting on edge of bed listening to the outside music group activity. Reports he isn't feeling well today, but unable to report what hurst. Maybe some weakness and pain in bilateral UE. Denies nausea or vomiting but continues to have baseline frequent loose stools   -Ate 90% of lunch while provider was there.   -Very fatigued and fell asleep several times throughout the exam.  -Full code status discussed and wants to continue all treatments including hospitalization. Understands he doesn't feel well today and unable to get labs until Friday. Would like evaluation in ED.     BP Readings from Last 3 Encounters:   05/29/24 117/55   05/29/24 128/69   05/20/24 (!) 164/98     Pulse Readings from Last 4 Encounters:   05/29/24 98   05/29/24 76   05/20/24 68   05/17/24 58     Wt Readings from Last 4 Encounters:   05/29/24 85.3 kg (188 lb)   05/29/24 85.7 kg (188 lb 14.4 oz)   05/20/24 85.7 kg (188 lb 14.4 oz)   05/17/24 85.7 kg (188 lb 14.4 oz)       Allergies, and PMH/PSH reviewed in EPIC today.  REVIEW OF SYSTEMS:  10 point ROS of systems including Constitutional, Eyes, Respiratory, Cardiovascular, Gastroenterology, Genitourinary, Integumentary, Musculoskeletal, Psychiatric were all negative except for pertinent positives noted in my HPI.    Objective:   /69   Pulse 76   Temp 97.8  F (36.6  C)   Resp 18   Ht 1.778 m (5' 10\")   Wt 85.7 kg (188 lb 14.4 oz)   SpO2 95%   BMI 27.10 kg/m    A & O x 3, NAD. Lungs CTA, non labored. RRR, S1/S2 w/o murmur,gallop or rub.  no edema.  Abdomen soft, nontender, +BT'S. No focal neurological deficits.  excessive sleepiness       Recent labs in EPIC reviewed by me today.   Lab Requisition on 05/20/2024   Component " Date Value Ref Range Status    Glucose 05/20/2024 114 (H)  70 - 99 mg/dL Final    Sodium 05/20/2024 137  135 - 145 mmol/L Final    Reference intervals for this test were updated on 09/26/2023 to more accurately reflect our healthy population. There may be differences in the flagging of prior results with similar values performed with this method. Interpretation of those prior results can be made in the context of the updated reference intervals.     Potassium 05/20/2024 4.4  3.4 - 5.3 mmol/L Final    Chloride 05/20/2024 101  98 - 107 mmol/L Final    Carbon Dioxide (CO2) 05/20/2024 22  22 - 29 mmol/L Final    Anion Gap 05/20/2024 14  7 - 15 mmol/L Final    Urea Nitrogen 05/20/2024 39.0 (H)  8.0 - 23.0 mg/dL Final    Creatinine 05/20/2024 1.56 (H)  0.67 - 1.17 mg/dL Final    GFR Estimate 05/20/2024 45 (L)  >60 mL/min/1.73m2 Final    Calcium 05/20/2024 10.0  8.8 - 10.2 mg/dL Final    WBC Count 05/20/2024 8.9  4.0 - 11.0 10e3/uL Final    RBC Count 05/20/2024 5.14  4.40 - 5.90 10e6/uL Final    Hemoglobin 05/20/2024 14.4  13.3 - 17.7 g/dL Final    Hematocrit 05/20/2024 44.2  40.0 - 53.0 % Final    MCV 05/20/2024 86  78 - 100 fL Final    MCH 05/20/2024 28.0  26.5 - 33.0 pg Final    MCHC 05/20/2024 32.6  31.5 - 36.5 g/dL Final    RDW 05/20/2024 14.6  10.0 - 15.0 % Final    Platelet Count 05/20/2024 225  150 - 450 10e3/uL Final    % Neutrophils 05/20/2024 64  % Final    % Lymphocytes 05/20/2024 16  % Final    % Monocytes 05/20/2024 6  % Final    % Eosinophils 05/20/2024 13  % Final    % Basophils 05/20/2024 1  % Final    % Immature Granulocytes 05/20/2024 0  % Final    NRBCs per 100 WBC 05/20/2024 0  <1 /100 Final    Absolute Neutrophils 05/20/2024 5.7  1.6 - 8.3 10e3/uL Final    Absolute Lymphocytes 05/20/2024 1.5  0.8 - 5.3 10e3/uL Final    Absolute Monocytes 05/20/2024 0.5  0.0 - 1.3 10e3/uL Final    Absolute Eosinophils 05/20/2024 1.2 (H)  0.0 - 0.7 10e3/uL Final    Absolute Basophils 05/20/2024 0.1  0.0 - 0.2 10e3/uL  Final    Absolute Immature Granulocytes 05/20/2024 0.0  <=0.4 10e3/uL Final    Absolute NRBCs 05/20/2024 0.0  10e3/uL Final         Assessment/Plan:     Pain in both upper extremities  Other fatigue  Diarrhea, unspecified type  Provider reviewed records from facility, and interpreted most recent imaging/lab work, and vital signs.   Acute increased fatigue, bilateral arm pain and weakness and excessive sleepiness. Reviewed Goals of Care and would like to be evaluated in ED for concerns.   OK to send to ED for further evaluation given full code status and inability to get labs prior to Friday.     MED REC REQUIRED  Post Medication Reconciliation Status: patient was not discharged from an inpatient facility or TCU      Orders:  OK to send to ED    Electronically signed by: Sariah Harper NP

## 2024-05-29 NOTE — ED TRIAGE NOTES
"Pt from assisted living and the new NP made a note to send pt to ED for \"New onset weakness and fatigue\" it is unclear to EMS as to when that change in patient status was observed. Pt states he feels fine but states he did feel weak this morning upon waking and improved as the  morning went along. Pt has had recent amputations to toes and some bleeding noted on bandages to left foot. Pt had stroke 3 weeks ago         "

## 2024-05-29 NOTE — ED PROVIDER NOTES
History     Chief Complaint   Patient presents with    Fatigue     HPI  Ever Crane is a 79 year old male with past medical history significant for hypertension chronic kidney disease heart failure peripheral vascular disease persistent atrial fibrillation type 2 diabetes history of foot amputation peripheral artery disease COPD coronary artery disease who presents emergency department complaining of fatigue generalized weakness patient states he woke up this morning and was just feeling weaker than usual he does especially in bilateral arms and his legs.  States he denied any chest pain or palpitations did not have a headache has not had a significant cough has not had any significant abdominal pain.  He has an ulcer on his right foot and this has been present for some time denies any calf pain just feels some generalized weakness in his legs.  Fall denies significant headache or visual changes    Allergies:  Allergies   Allergen Reactions    Lanolin      Other Reaction(s): Not available    Cranberry Extract Itching and Rash    Doxycycline Rash    Latex Rash    Penicillin V Rash     Reaction occurred as a child. Patient tolerated Zosyn 6/2018, Cefazolin 10/2018, and has also tolerated Augmentin.    Penicillins Rash     Reaction occurred as a child. Patient tolerated Zosyn 6/2018, Cefazolin 10/2018, and has also tolerated Augmentin.       Problem List:    Patient Active Problem List    Diagnosis Date Noted    Alteration in skin integrity 04/22/2024     Priority: Medium    Infarction of left basal ganglia (H) 04/21/2024     Priority: Medium    Ulcer of right foot limited to breakdown of skin (H) 10/18/2023     Priority: Medium    Pressure ulcer of right heel, stage 3 (H) 01/13/2023     Priority: Medium    Pressure ulcer of left heel, stage 3 (H) 01/13/2023     Priority: Medium    PAD (peripheral artery disease) (H24) 01/05/2023     Priority: Medium    Cellulitis and abscess of foot excluding toe 01/05/2023      Priority: Medium    BPH (benign prostatic hyperplasia) 12/06/2022     Priority: Medium    Diarrhea 12/06/2022     Priority: Medium    DM type 2 with diabetic mixed hyperlipidemia (H) 12/06/2022     Priority: Medium    Long term current use of anticoagulant therapy 06/07/2022     Priority: Medium    Polyneuropathy due to type 2 diabetes mellitus (H) 06/07/2022     Priority: Medium    Status post amputation of foot (H) 06/07/2022     Priority: Medium    Type 2 diabetes mellitus with other circulatory complication, with long-term current use of insulin (H) 05/25/2022     Priority: Medium    Coronary atherosclerosis 05/17/2022     Priority: Medium    Hyperlipidemia 05/17/2022     Priority: Medium     Formatting of this note might be different from the original.  Created by Conversion      Typical atrial flutter (H) 05/17/2022     Priority: Medium     Formatting of this note might be different from the original.  Created by Conversion      Chronic obstructive pulmonary disease, unspecified (H) 05/17/2022     Priority: Medium    Occlusion and stenosis of right carotid artery 05/17/2022     Priority: Medium    Essential hypertension 05/12/2022     Priority: Medium     Created by Conversion  Replacement Utility updated for latest IMO load        Ischemic cardiomyopathy 05/12/2022     Priority: Medium    Carotid stenosis, asymptomatic, right 05/12/2022     Priority: Medium    Chronic venous stasis dermatitis 05/12/2022     Priority: Medium    Persistent atrial fibrillation (H) 05/12/2022     Priority: Medium     Formatting of this note might be different from the original.  Was on sotalol prior to CABG but not since. Even metoprolol was stopped and he was in sinus rhythm on Holter in Dec 2018.    Formatting of this note might be different from the original.  Was on sotalol prior to CABG but not since. Even metoprolol was stopped and he was in sinus rhythm on Holter in Dec 2018.      Osteoarthrosis 05/09/2022     Priority:  Medium    Polyneuropathy in diseases classified elsewhere (H24) 05/09/2022     Priority: Medium    Coronary artery disease of autologous bypass graft with stable angina pectoris (H24) 03/14/2022     Priority: Medium    Constipation, unspecified 10/18/2021     Priority: Medium    Stage 3b chronic kidney disease (H) 11/12/2020     Priority: Medium    Dementia, unspecified dementia severity, unspecified dementia type, unspecified whether behavioral, psychotic, or mood disturbance or anxiety (H) 10/14/2019     Priority: Medium    Presence of aortocoronary bypass graft 10/14/2019     Priority: Medium    Medically noncompliant 06/20/2018     Priority: Medium    Heart failure with preserved ejection fraction, NYHA class II (H) 01/25/2018     Priority: Medium    Peripheral vascular disease (H24) 11/14/2017     Priority: Medium    Acquired lymphedema of lower extremity 10/16/2017     Priority: Medium    Venous hypertension, chronic, bilateral 08/10/2017     Priority: Medium    Mcgarry's esophagus 01/01/2012     Priority: Medium     Formatting of this note might be different from the original.  per note of Dr. Tavo Mike of [facility]          Past Medical History:    Past Medical History:   Diagnosis Date    A-fib (H)     Acute diastolic heart failure (H) 06/07/2022    Acute posthemorrhagic anemia 05/17/2022    MESHA (acute kidney injury) (H24)     Anemia     Atopic keratoconjunctivitis     Atrial fibrillation (H)     Atrial flutter (H)     Mcgarry's esophagus 01/01/2012    Bilateral lower leg cellulitis 08/31/2018    BPH (benign prostatic hyperplasia)     Candidiasis of perineum 01/03/2018    Carotid stenosis     Carotid stenosis, asymptomatic, right     Cellulitis     CHF (congestive heart failure) (H)     Cholecystitis 10/14/2019    Cholecystitis, acute 08/18/2019    Chronic systolic heart failure (H)     Chronic venous stasis dermatitis     Closed nondisplaced intertrochanteric fracture of left femur with routine healing,  subsequent encounter 05/18/2022    Clostridium difficile colitis     Contact with and (suspected) exposure to covid-19 12/13/2021    COPD (chronic obstructive pulmonary disease) (H)     Coronary artery disease due to lipid rich plaque 2000    Coronary atherosclerosis     Diabetes (H)     Diabetic ulcer of both feet (H) 10/31/2017    Diabetic ulcer of toe of right foot associated with diabetes mellitus due to underlying condition, limited to breakdown of skin (H) 01/05/2023    Diabetic ulcer of toe of right foot associated with diabetes mellitus due to underlying condition, with necrosis of bone (H) 01/05/2023    Dyslexia     Dyslipidemia, goal LDL below 70 2000    Epistaxis     Essential hypertension     Gangrene of left foot (H)     GERD (gastroesophageal reflux disease)     HLD (hyperlipidemia)     HTN (hypertension)     Hyponatremia     Ischemic heart disease     Lymphedema     Mild cognitive impairment     MRSA (methicillin resistant Staphylococcus aureus)     Neuropathy     Non-STEMI (non-ST elevated myocardial infarction) (H)     Occlusion and stenosis of right carotid artery     Osteoarthrosis     Osteomyelitis of ankle and foot (H)     Other atopic dermatitis     PAD (peripheral artery disease) (H24)     Peripheral vascular disease (H24)     Pneumonia 06/26/2018    Polyneuropathy due to type 2 diabetes mellitus (H)     Pressure ulcer, heel     Renal insufficiency     Type 2 diabetes mellitus, without long-term current use of insulin (H)     Ulcer of right foot (H)     Unable to function independently 11/13/2017       Past Surgical History:    Past Surgical History:   Procedure Laterality Date    AMPUTATE FOOT Left 11/05/2017    Procedure: LEFT TRANSMETATARSAL AMPUTATION;  Surgeon: Ever Wick MD;  Location: [facility];  Service:     AMPUTATE FOOT Right 4/27/2023    Procedure: Transmetatarsal amputation right foot;  Surgeon: Miguelangel Spears DPM;  Location: [facility]    AMPUTATE FOOT  Right 5/15/2023    Procedure: partial calcanectomy;  Surgeon: Miguelangel Spears DPM;  Location: [facility]    BYPASS GRAFT ARTERY CORONARY N/A 03/06/2000    SVG to OM1, SVG to PDA    BYPASS GRAFT ARTERY CORONARY N/A 10/04/2018    redo CABG due to graft failure    CABG MEASURES GRP      CARDIOVERSION  08/25/2011    CV CORONARY ANGIOGRAM N/A 10/01/2018    Procedure: Coronary Angiogram;  Surgeon: Miki Mac MD;  Location: [facility];  Service:     INCISION AND DRAINAGE FOOT, COMBINED Right 5/15/2023    Procedure: INCISION AND DRAINAGE, right heel with,;  Surgeon: Miguelangel Spears DPM;  Location: [facility]    INCISION AND DRAINAGE FOOT, COMBINED Bilateral 6/1/2023    Procedure: INCISION AND DRAINAGE, right foot with debridement of ulceration bilateral heels;  Surgeon: Miguelangel Spears DPM;  Location: [facility]    INGUINAL HERNIA REPAIR Bilateral 1969    and 1979    IR EXTREMITY ANGIOGRAM BILATERAL  11/3/2017    IR LOWER EXTREMITY ANGIOGRAM LEFT  3/10/2023    IR LOWER EXTREMITY ANGIOGRAM RIGHT  1/24/2023    LAPAROSCOPIC CHOLECYSTECTOMY N/A 08/18/2019    Procedure: CHOLECYSTECTOMY, LAPAROSCOPIC;  Surgeon: Stewart Fountain MD;  Location: [facility];  Service: General    LENGTHEN TENDON ACHILLES Right 4/27/2023    Procedure: with Achilles tendon lengthening, debridement of right heel ulceration.;  Surgeon: Miguelangel Spears DPM;  Location: [facility]       Family History:    Family History   Problem Relation Age of Onset    Sudden Death Mother 85.00    CABG Father     Valvular heart disease Father     Esophageal Cancer Father 58.00        cause of death    No Known Problems Son     No Known Problems Sister     Obesity Sister     Osteoarthritis Sister     No Known Problems Sister     No Known Problems Grandchild     No Known Problems Grandchild        Social History:  Marital Status:   [4]  Social History     Tobacco Use    Smoking status:  Former     Current packs/day: 0.00     Average packs/day: 1 pack/day for 36.0 years (36.0 ttl pk-yrs)     Types: Cigarettes     Start date: 1964     Quit date: 2000     Years since quittin.0    Smokeless tobacco: Never   Substance Use Topics    Alcohol use: Not Currently     Alcohol/week: 5.0 standard drinks of alcohol     Types: 1 Cans of beer, 4 Standard drinks or equivalent per week    Drug use: No        Medications:    acetaminophen (TYLENOL) 325 MG tablet  acetaminophen (TYLENOL) 500 MG tablet  albuterol (PROAIR HFA/PROVENTIL HFA/VENTOLIN HFA) 108 (90 Base) MCG/ACT inhaler  cetirizine (ZYRTEC) 5 MG tablet  clopidogrel (PLAVIX) 75 MG tablet  dulaglutide (TRULICITY) 0.75 MG/0.5ML pen  furosemide (LASIX) 40 MG tablet  gabapentin (NEURONTIN) 100 MG capsule  glimepiride (AMARYL) 1 MG tablet  hypromellose (ARTIFICIAL TEARS) 0.5 % SOLN ophthalmic solution  ketoconazole (NIZORAL) 2 % external shampoo  losartan (COZAAR) 25 MG tablet  naloxone (NARCAN) 0.4 MG/ML injection  omeprazole (PRILOSEC) 20 MG CR capsule  rivaroxaban ANTICOAGULANT (XARELTO) 15 MG TABS tablet  simvastatin (ZOCOR) 40 MG tablet  spironolactone (ALDACTONE) 25 MG tablet  tamsulosin (FLOMAX) 0.4 MG capsule          Review of Systems  As per HPI.  Physical Exam   BP: 112/70  Pulse: 63  Temp: 97.7  F (36.5  C)  Resp: 18  Weight: 85.3 kg (188 lb)  SpO2: 95 %      Physical Exam  Vitals and nursing note reviewed.   Constitutional:       Appearance: He is not ill-appearing, toxic-appearing or diaphoretic.      Comments: Frail male in no obvious acute distress.   HENT:      Head: Normocephalic and atraumatic.      Nose: Nose normal.      Mouth/Throat:      Mouth: Mucous membranes are moist.      Pharynx: Oropharynx is clear.   Eyes:      Conjunctiva/sclera: Conjunctivae normal.   Cardiovascular:      Rate and Rhythm: Normal rate and regular rhythm.      Pulses: Normal pulses.      Heart sounds: Normal heart sounds. No murmur heard.  Pulmonary:       Effort: Pulmonary effort is normal.      Breath sounds: Normal breath sounds. No stridor. No wheezing or rhonchi.   Abdominal:      General: Abdomen is flat. Bowel sounds are normal. There is no distension.      Palpations: Abdomen is soft.      Tenderness: There is no abdominal tenderness. There is no right CVA tenderness, left CVA tenderness, guarding or rebound.      Hernia: No hernia is present.   Musculoskeletal:         General: No swelling or tenderness. Normal range of motion.      Cervical back: Normal range of motion and neck supple.      Right lower leg: No edema.      Left lower leg: No edema.   Skin:     General: Skin is warm and dry.      Capillary Refill: Capillary refill takes less than 2 seconds.      Findings: No rash.   Neurological:      General: No focal deficit present.      Mental Status: He is alert and oriented to person, place, and time.      Sensory: No sensory deficit.      Motor: No weakness.      Coordination: Coordination normal.         ED Course        Procedures              EKG Interpretation:      Interpreted by Miguel Wheeler MD  Rhythm: atrial fibrillation/flutter - controlled  Rate: Normal  Axis: Normal  Ectopy: none  Conduction: normal  ST Segments/ T Waves: Non-specific ST-T wave changes  Q Waves: none  Comparison to prior: Unchanged from 4/21/24    Clinical Impression: Atrial fibs/flutter with nonspecific ST-T wave changes.    Critical Care time:  none               Results for orders placed or performed during the hospital encounter of 05/29/24 (from the past 24 hour(s))   [place] Draw    Narrative    The following orders were created for panel order [place] Draw.  Procedure                               Abnormality         Status                     ---------                               -----------         ------                     Extra Blue Top Tube[[id number]]                              In process                 Extra Red Top Tube[[id number]]                                In process                 Extra Green Top (Lithium...[[id number]]                      In process                 Extra Purple Top Tube[[id number]]                            In process                   Please view results for these tests on the individual orders.   CBC with platelets, differential    Narrative    The following orders were created for panel order CBC with platelets, differential.  Procedure                               Abnormality         Status                     ---------                               -----------         ------                     CBC with platelets and d...[[id number]]  Abnormal            Final result                 Please view results for these tests on the individual orders.   CBC with platelets and differential   Result Value Ref Range    WBC Count 9.8 4.0 - 11.0 10e3/uL    RBC Count 4.83 4.40 - 5.90 10e6/uL    Hemoglobin 13.4 13.3 - 17.7 g/dL    Hematocrit 41.5 40.0 - 53.0 %    MCV 86 78 - 100 fL    MCH 27.7 26.5 - 33.0 pg    MCHC 32.3 31.5 - 36.5 g/dL    RDW 14.1 10.0 - 15.0 %    Platelet Count 210 150 - 450 10e3/uL    % Neutrophils 57 %    % Lymphocytes 17 %    % Monocytes 5 %    % Eosinophils 20 %    % Basophils 1 %    % Immature Granulocytes 0 %    NRBCs per 100 WBC 0 <1 /100    Absolute Neutrophils 5.5 1.6 - 8.3 10e3/uL    Absolute Lymphocytes 1.6 0.8 - 5.3 10e3/uL    Absolute Monocytes 0.5 0.0 - 1.3 10e3/uL    Absolute Eosinophils 1.9 (H) 0.0 - 0.7 10e3/uL    Absolute Basophils 0.1 0.0 - 0.2 10e3/uL    Absolute Immature Granulocytes 0.0 <=0.4 10e3/uL    Absolute NRBCs 0.0 10e3/uL       Medications - No data to display  Results for orders placed or performed during the hospital encounter of 05/29/24   Chest XR,  PA & LAT    Narrative    EXAM: XR CHEST 2 VIEWS  LOCATION: [facility]  DATE: 5/29/2024    INDICATION: chest pain ; fatigue  COMPARISON: 5/26/2023.      Impression    IMPRESSION: Prior sternotomy and apparent CABG  procedure. Normal heart size. No evidence for CHF or pneumonia. No pleural effusion or pneumothorax. Mild scarring or atelectasis at the lung bases, better seen on chest CT 4/21/2024.      Assessments & Plan (with Medical Decision Making) records were reviewed including past medical history medications and allergies.  Geriatrics visit from 5/20/2024 was reviewed.  Geriatrics visits from 5/10/2024 was reviewed.  EKG revealed an atrial fibrillation/flutter with nonspecific ST-T wave abnormality rate controlled no significant change from previous.  Labs were obtained I dependently reviewed and interpreted labs.  CBC was without significant abnormality.  Comprehensive metabolic panel significant for BUN 29 creatinine 1.58.  This appears to be near baseline for patient.  Initial troponin 26 delta troponin 2 hours later was 22.  UA without significant abnormality.  Patient is drinking fluids well at this time.  A chest x-ray was obtained.  I independently reviewed and interpreted the images with post-CABG changes no obvious infiltrate cardiomegaly or other significant abnormality.  Findings discussed in detail with patient.  He states he feels baseline and at this time I do not think CT head imaging or other workup is warranted he felt comfortable going home at this time.  He did not feel further workup is warranted.  Patient will return if any worsening symptoms.     I have reviewed the nursing notes.    I have reviewed the findings, diagnosis, plan and need for follow up with the patient.           Discharge Medication List as of 5/29/2024  7:39 PM          Final diagnoses:   Malaise and fatigue - transient       5/29/2024   [facility] EMERGENCY DEPT       Miguel Wheeler MD  05/31/24 0912

## 2024-05-29 NOTE — LETTER
"    5/29/2024        RE: Ever Crane  [address] [address] [facility] [address]        [facility] GERIATRICS    Chief Complaint   Patient presents with     Nursing Home Acute     HPI:  Ever Crane is a 79 year old  (1945), who is being seen today for an episodic care visit at: [facility] () [[id number]]. Today's concern is:   Patient resting on edge of bed listening to the outside music group activity. Reports he isn't feeling well today, but unable to report what hurst. Maybe some weakness and pain in bilateral UE. Denies nausea or vomiting but continues to have baseline frequent loose stools   -Ate 90% of lunch while provider was there.   -Very fatigued and fell asleep several times throughout the exam.  -Full code status discussed and wants to continue all treatments including hospitalization. Understands he doesn't feel well today and unable to get labs until Friday. Would like evaluation in ED.     BP Readings from Last 3 Encounters:   05/29/24 117/55   05/29/24 128/69   05/20/24 (!) 164/98     Pulse Readings from Last 4 Encounters:   05/29/24 98   05/29/24 76   05/20/24 68   05/17/24 58     Wt Readings from Last 4 Encounters:   05/29/24 85.3 kg (188 lb)   05/29/24 85.7 kg (188 lb 14.4 oz)   05/20/24 85.7 kg (188 lb 14.4 oz)   05/17/24 85.7 kg (188 lb 14.4 oz)       Allergies, and PMH/PSH reviewed in EPIC today.  REVIEW OF SYSTEMS:  10 point ROS of systems including Constitutional, Eyes, Respiratory, Cardiovascular, Gastroenterology, Genitourinary, Integumentary, Musculoskeletal, Psychiatric were all negative except for pertinent positives noted in my HPI.    Objective:   /69   Pulse 76   Temp 97.8  F (36.6  C)   Resp 18   Ht 1.778 m (5' 10\")   Wt 85.7 kg (188 lb 14.4 oz)   SpO2 95%   BMI 27.10 kg/m    A & O x 3, NAD. Lungs CTA, non labored. RRR, S1/S2 w/o murmur,gallop or rub.  no edema.  Abdomen soft, nontender, +BT'S. No focal neurological deficits.  excessive sleepiness "       Recent labs in [facility] reviewed by me today.   Lab Requisition on 05/20/2024   Component Date Value Ref Range Status     Glucose 05/20/2024 114 (H)  70 - 99 mg/dL Final     Sodium 05/20/2024 137  135 - 145 mmol/L Final    Reference intervals for this test were updated on 09/26/2023 to more accurately reflect our healthy population. There may be differences in the flagging of prior results with similar values performed with this method. Interpretation of those prior results can be made in the context of the updated reference intervals.      Potassium 05/20/2024 4.4  3.4 - 5.3 mmol/L Final     Chloride 05/20/2024 101  98 - 107 mmol/L Final     Carbon Dioxide (CO2) 05/20/2024 22  22 - 29 mmol/L Final     Anion Gap 05/20/2024 14  7 - 15 mmol/L Final     Urea Nitrogen 05/20/2024 39.0 (H)  8.0 - 23.0 mg/dL Final     Creatinine 05/20/2024 1.56 (H)  0.67 - 1.17 mg/dL Final     GFR Estimate 05/20/2024 45 (L)  >60 mL/min/1.73m2 Final     Calcium 05/20/2024 10.0  8.8 - 10.2 mg/dL Final     WBC Count 05/20/2024 8.9  4.0 - 11.0 10e3/uL Final     RBC Count 05/20/2024 5.14  4.40 - 5.90 10e6/uL Final     Hemoglobin 05/20/2024 14.4  13.3 - 17.7 g/dL Final     Hematocrit 05/20/2024 44.2  40.0 - 53.0 % Final     MCV 05/20/2024 86  78 - 100 fL Final     MCH 05/20/2024 28.0  26.5 - 33.0 pg Final     MCHC 05/20/2024 32.6  31.5 - 36.5 g/dL Final     RDW 05/20/2024 14.6  10.0 - 15.0 % Final     Platelet Count 05/20/2024 225  150 - 450 10e3/uL Final     % Neutrophils 05/20/2024 64  % Final     % Lymphocytes 05/20/2024 16  % Final     % Monocytes 05/20/2024 6  % Final     % Eosinophils 05/20/2024 13  % Final     % Basophils 05/20/2024 1  % Final     % Immature Granulocytes 05/20/2024 0  % Final     NRBCs per 100 WBC 05/20/2024 0  <1 /100 Final     Absolute Neutrophils 05/20/2024 5.7  1.6 - 8.3 10e3/uL Final     Absolute Lymphocytes 05/20/2024 1.5  0.8 - 5.3 10e3/uL Final     Absolute Monocytes 05/20/2024 0.5  0.0 - 1.3 10e3/uL Final      Absolute Eosinophils 05/20/2024 1.2 (H)  0.0 - 0.7 10e3/uL Final     Absolute Basophils 05/20/2024 0.1  0.0 - 0.2 10e3/uL Final     Absolute Immature Granulocytes 05/20/2024 0.0  <=0.4 10e3/uL Final     Absolute NRBCs 05/20/2024 0.0  10e3/uL Final         Assessment/Plan:     Pain in both upper extremities  Other fatigue  Diarrhea, unspecified type  Provider reviewed records from facility, and interpreted most recent imaging/lab work, and vital signs.   Acute increased fatigue, bilateral arm pain and weakness and excessive sleepiness. Reviewed Goals of Care and would like to be evaluated in ED for concerns.   OK to send to ED for further evaluation given full code status and inability to get labs prior to Friday.     MED REC REQUIRED  Post Medication Reconciliation Status: patient was not discharged from an inpatient facility or TCU      Orders:  OK to send to ED    Electronically signed by: Sariah Harper NP           Sincerely,        Sariah Harper NP

## 2024-05-30 VITALS
OXYGEN SATURATION: 95 % | TEMPERATURE: 97.8 F | WEIGHT: 188.9 LBS | HEART RATE: 76 BPM | HEIGHT: 70 IN | RESPIRATION RATE: 18 BRPM | SYSTOLIC BLOOD PRESSURE: 128 MMHG | DIASTOLIC BLOOD PRESSURE: 69 MMHG | BODY MASS INDEX: 27.04 KG/M2

## 2024-05-30 NOTE — DISCHARGE INSTRUCTIONS
Return if symptoms worsen or new symptoms develop.  Follow-up with primary care physician next available.  Drink plenty fluids.  If any fevers nausea vomiting 20 chest pain shortness of breath abdominal pain focal numbness weakness any extremity or bowel bladder dysfunction recurrent please return for further evaluation and care.  No obvious source of your generalized weakness fatigue which he stated at this time has significantly resolved.

## 2024-05-30 NOTE — PROGRESS NOTES
"[facility] GERIATRICS    Chief Complaint   Patient presents with    Nursing Home Acute     ED F/U     HPI:  Ever Crane is a 79 year old  (1945), who is being seen today for an episodic care visit at: [facility] () [[id number]]. Today's concern is:   -Patient evaluated in the ED on 5/29/24 2/2 weakness and upper extremity weakness. Nothing concerning and patient returned to facility.   -Today, reports he is slightly better but remains fatigued. No concerns per staff.     Vital signs reviewed by me today:  BP Readings from Last 3 Encounters:   05/30/24 128/69   05/29/24 117/55   05/29/24 128/69     Pulse Readings from Last 4 Encounters:   05/30/24 76   05/29/24 98   05/29/24 76   05/20/24 68     Wt Readings from Last 4 Encounters:   05/30/24 85.7 kg (188 lb 14.4 oz)   05/29/24 85.3 kg (188 lb)   05/29/24 85.7 kg (188 lb 14.4 oz)   05/20/24 85.7 kg (188 lb 14.4 oz)         Allergies, and PMH/PSH reviewed in EPIC today.  REVIEW OF SYSTEMS:  10 point ROS of systems including Constitutional, Eyes, Respiratory, Cardiovascular, Gastroenterology, Genitourinary, Integumentary, Musculoskeletal, Psychiatric were all negative except for pertinent positives noted in my HPI.    Objective:   /69   Pulse 76   Temp 97.8  F (36.6  C)   Resp 18   Ht 1.778 m (5' 10\")   Wt 85.7 kg (188 lb 14.4 oz)   SpO2 95%   BMI 27.10 kg/m    A & O x 3, NAD. Lungs CTA, non labored. RRR, S1/S2 w/o murmur,gallop or rub.  No edema.  Abdomen soft, nontender, +BT'S. No focal neurological deficits.        Recent labs in EPIC reviewed by me today.   Most Recent 3 CBC's:  Recent Labs   Lab Test 05/29/24  1552 05/20/24  0937 05/17/24  1700   WBC 9.8 8.9 14.6*   HGB 13.4 14.4 15.2   MCV 86 86 85    225 229     Most Recent 3 BMP's:  Recent Labs   Lab Test 05/29/24  1552 05/20/24  0937 05/17/24  1700 04/23/24  1256    137 135  --    POTASSIUM 4.6 4.4 4.6  --    CHLORIDE 102 101 99  --    CO2 23 22 20*  --    BUN 29.1* " 39.0* 40.4*  --    CR 1.58* 1.56* 1.67*  --    ANIONGAP 14 14 16*  --    MELODY 9.5 10.0 9.3  --    * 114*  --  104*     Most Recent 3 Hemoglobins:  Recent Labs   Lab Test 05/29/24  1552 05/20/24  0937 05/17/24  1700   HGB 13.4 14.4 15.2     Most Recent 3 Troponin's:  Recent Labs   Lab Test 06/26/18  0339   TROPI <0.015     Most Recent 3 BNP's:  Recent Labs   Lab Test 04/21/24  1707 06/28/18  0624 06/27/18  2045   NTBNPI 169 4,872* 4,514*     Most Recent D-dimer:  Recent Labs   Lab Test 09/29/18  0059   DD 0.31     Most Recent TSH and T4:  Recent Labs   Lab Test 07/12/22  0808   TSH 3.20     Most Recent Hemoglobin A1c:  Recent Labs   Lab Test 04/21/24  1707   A1C 7.0*     Most Recent 6 glucoses:  Recent Labs   Lab Test 05/29/24  1552 05/20/24  0937 04/23/24  1256 04/23/24  0823 04/22/24  2142 04/22/24  1643   * 114* 104* 148* 154* 118*     Most Recent Urinalysis:  Recent Labs   Lab Test 05/29/24  1715 04/21/24  1809 10/04/18  0648   COLOR Yellow   < > Yellow   APPEARANCE Clear   < > Clear   URINEGLC Negative   < > Negative   URINEBILI Negative   < > Negative   URINEKETONE Negative   < > Negative   SG 1.011   < > 1.023   UBLD Negative   < > Trace*   URINEPH 5.0   < > 6.0   PROTEIN Negative   < > Negative   UROBILINOGEN  --   --  <2.0 E.U./dL   NITRITE Negative   < > Negative   LEUKEST Negative   < > Negative   RBCU <1   < > 0-2   WBCU <1   < > 0-5    < > = values in this interval not displayed.         Assessment/Plan:     Pain in both upper extremities  Other fatigue  Diarrhea, unspecified type  Essential hypertension  Provider reviewed records from facility, and interpreted most recent imaging/lab work, and vital signs.   ED evaluation without findings. Will continue to monitor and update NP with changes. Will follow up next week.     MED REC REQUIRED  Post Medication Reconciliation Status: patient was not discharged from an inpatient facility or TCU      Orders:  See above, otherwise the current medical  regimen is effective;  continue present plan and medications.      Electronically signed by: Sariah Harper NP

## 2024-05-31 ENCOUNTER — NURSING HOME VISIT (OUTPATIENT)
Dept: GERIATRICS | Facility: CLINIC | Age: 79
End: 2024-05-31
Payer: MEDICARE

## 2024-05-31 DIAGNOSIS — M79.602 PAIN IN BOTH UPPER EXTREMITIES: Primary | ICD-10-CM

## 2024-05-31 DIAGNOSIS — I10 ESSENTIAL HYPERTENSION: Chronic | ICD-10-CM

## 2024-05-31 DIAGNOSIS — R19.7 DIARRHEA, UNSPECIFIED TYPE: ICD-10-CM

## 2024-05-31 DIAGNOSIS — M79.601 PAIN IN BOTH UPPER EXTREMITIES: Primary | ICD-10-CM

## 2024-05-31 DIAGNOSIS — R53.83 OTHER FATIGUE: ICD-10-CM

## 2024-05-31 PROCEDURE — 99309 SBSQ NF CARE MODERATE MDM 30: CPT | Performed by: NURSE PRACTITIONER

## 2024-05-31 NOTE — LETTER
"    5/31/2024        RE: Ever Crane  [address] [address] [facility] [address]        [facility] GERIATRICS    Chief Complaint   Patient presents with     Nursing Home Acute     ED F/U     HPI:  Ever Crane is a 79 year old  (1945), who is being seen today for an episodic care visit at: [facility] () [[id number]]. Today's concern is:   -Patient evaluated in the ED on 5/29/24 2/2 weakness and upper extremity weakness. Nothing concerning and patient returned to facility.   -Today, reports he is slightly better but remains fatigued. No concerns per staff.     Vital signs reviewed by me today:  BP Readings from Last 3 Encounters:   05/30/24 128/69   05/29/24 117/55   05/29/24 128/69     Pulse Readings from Last 4 Encounters:   05/30/24 76   05/29/24 98   05/29/24 76   05/20/24 68     Wt Readings from Last 4 Encounters:   05/30/24 85.7 kg (188 lb 14.4 oz)   05/29/24 85.3 kg (188 lb)   05/29/24 85.7 kg (188 lb 14.4 oz)   05/20/24 85.7 kg (188 lb 14.4 oz)         Allergies, and PMH/PSH reviewed in EPIC today.  REVIEW OF SYSTEMS:  10 point ROS of systems including Constitutional, Eyes, Respiratory, Cardiovascular, Gastroenterology, Genitourinary, Integumentary, Musculoskeletal, Psychiatric were all negative except for pertinent positives noted in my HPI.    Objective:   /69   Pulse 76   Temp 97.8  F (36.6  C)   Resp 18   Ht 1.778 m (5' 10\")   Wt 85.7 kg (188 lb 14.4 oz)   SpO2 95%   BMI 27.10 kg/m    A & O x 3, NAD. Lungs CTA, non labored. RRR, S1/S2 w/o murmur,gallop or rub.  No edema.  Abdomen soft, nontender, +BT'S. No focal neurological deficits.        Recent labs in [facility] reviewed by me today.   Most Recent 3 CBC's:  Recent Labs   Lab Test 05/29/24  1552 05/20/24  0937 05/17/24  1700   WBC 9.8 8.9 14.6*   HGB 13.4 14.4 15.2   MCV 86 86 85    225 229     Most Recent 3 BMP's:  Recent Labs   Lab Test 05/29/24  1552 05/20/24  0937 05/17/24  1700 04/23/24  1256    137 135  --  "   POTASSIUM 4.6 4.4 4.6  --    CHLORIDE 102 101 99  --    CO2 23 22 20*  --    BUN 29.1* 39.0* 40.4*  --    CR 1.58* 1.56* 1.67*  --    ANIONGAP 14 14 16*  --    MELODY 9.5 10.0 9.3  --    * 114*  --  104*     Most Recent 3 Hemoglobins:  Recent Labs   Lab Test 05/29/24  1552 05/20/24  0937 05/17/24  1700   HGB 13.4 14.4 15.2     Most Recent 3 Troponin's:  Recent Labs   Lab Test 06/26/18  0339   TROPI <0.015     Most Recent 3 BNP's:  Recent Labs   Lab Test 04/21/24  1707 06/28/18  0624 06/27/18  2045   NTBNPI 169 4,872* 4,514*     Most Recent D-dimer:  Recent Labs   Lab Test 09/29/18  0059   DD 0.31     Most Recent TSH and T4:  Recent Labs   Lab Test 07/12/22  0808   TSH 3.20     Most Recent Hemoglobin A1c:  Recent Labs   Lab Test 04/21/24  1707   A1C 7.0*     Most Recent 6 glucoses:  Recent Labs   Lab Test 05/29/24  1552 05/20/24  0937 04/23/24  1256 04/23/24  0823 04/22/24  2142 04/22/24  1643   * 114* 104* 148* 154* 118*     Most Recent Urinalysis:  Recent Labs   Lab Test 05/29/24  1715 04/21/24  1809 10/04/18  0648   COLOR Yellow   < > Yellow   APPEARANCE Clear   < > Clear   URINEGLC Negative   < > Negative   URINEBILI Negative   < > Negative   URINEKETONE Negative   < > Negative   SG 1.011   < > 1.023   UBLD Negative   < > Trace*   URINEPH 5.0   < > 6.0   PROTEIN Negative   < > Negative   UROBILINOGEN  --   --  <2.0 E.U./dL   NITRITE Negative   < > Negative   LEUKEST Negative   < > Negative   RBCU <1   < > 0-2   WBCU <1   < > 0-5    < > = values in this interval not displayed.         Assessment/Plan:     Pain in both upper extremities  Other fatigue  Diarrhea, unspecified type  Essential hypertension  Provider reviewed records from facility, and interpreted most recent imaging/lab work, and vital signs.   ED evaluation without findings. Will continue to monitor and update NP with changes. Will follow up next week.     MED REC REQUIRED  Post Medication Reconciliation Status: patient was not discharged  from an inpatient facility or TCU      Orders:  See above, otherwise the current medical regimen is effective;  continue present plan and medications.      Electronically signed by: Sariah Harper NP           Sincerely,        Sariah Harper NP

## 2024-06-18 ENCOUNTER — NURSING HOME VISIT (OUTPATIENT)
Dept: GERIATRICS | Facility: CLINIC | Age: 79
End: 2024-06-18
Payer: MEDICARE

## 2024-06-18 VITALS
HEART RATE: 72 BPM | WEIGHT: 188.6 LBS | OXYGEN SATURATION: 98 % | DIASTOLIC BLOOD PRESSURE: 74 MMHG | TEMPERATURE: 97.9 F | HEIGHT: 70 IN | RESPIRATION RATE: 18 BRPM | BODY MASS INDEX: 27 KG/M2 | SYSTOLIC BLOOD PRESSURE: 110 MMHG

## 2024-06-18 DIAGNOSIS — Z79.4 TYPE 2 DIABETES MELLITUS WITH OTHER CIRCULATORY COMPLICATION, WITH LONG-TERM CURRENT USE OF INSULIN (H): ICD-10-CM

## 2024-06-18 DIAGNOSIS — E11.59 TYPE 2 DIABETES MELLITUS WITH OTHER CIRCULATORY COMPLICATION, WITH LONG-TERM CURRENT USE OF INSULIN (H): ICD-10-CM

## 2024-06-18 DIAGNOSIS — M79.602 PAIN IN BOTH UPPER EXTREMITIES: Primary | ICD-10-CM

## 2024-06-18 DIAGNOSIS — I10 ESSENTIAL HYPERTENSION: ICD-10-CM

## 2024-06-18 DIAGNOSIS — M79.601 PAIN IN BOTH UPPER EXTREMITIES: Primary | ICD-10-CM

## 2024-06-18 DIAGNOSIS — I73.9 PERIPHERAL VASCULAR DISEASE (H): ICD-10-CM

## 2024-06-18 DIAGNOSIS — I50.30 HEART FAILURE WITH PRESERVED EJECTION FRACTION, NYHA CLASS II (H): ICD-10-CM

## 2024-06-18 DIAGNOSIS — Z86.73 HISTORY OF CVA (CEREBROVASCULAR ACCIDENT): ICD-10-CM

## 2024-06-18 DIAGNOSIS — I87.2 VENOUS STASIS DERMATITIS OF BOTH LOWER EXTREMITIES: ICD-10-CM

## 2024-06-18 PROCEDURE — 99309 SBSQ NF CARE MODERATE MDM 30: CPT | Performed by: NURSE PRACTITIONER

## 2024-06-18 NOTE — LETTER
6/18/2024      Ever Crane  [address] [address] [address]        [facility] GERIATRICS  Chief Complaint   Patient presents with     Annual Comprehensive Nursing Home     [facility] Record Number:  [id number]  Place of Service where encounter took place:  [facility] () [[id number]]    HPI:    Ever Crane  is a 79 year old  (1945), who is being seen today for an annual comprehensive visit. HPI information obtained from:    Patient resting in bed eating lunch. Reports he is feeling well and is without concerns. Legs intermittently cause him problems but ok now.   Denies CP, SOB and lightheadedness.   No recent falls per report.   Recent BIMS 15/15 and PHQ-19 3/27. Mood stable.     Vital signs reviewed by me today:  BP Readings from Last 3 Encounters:   07/09/24 135/66   06/18/24 110/74   05/30/24 128/69     Pulse Readings from Last 4 Encounters:   07/09/24 78   06/18/24 72   05/30/24 76   05/29/24 98     Wt Readings from Last 4 Encounters:   07/09/24 84.8 kg (187 lb)   06/18/24 85.5 kg (188 lb 9.6 oz)   05/30/24 85.7 kg (188 lb 14.4 oz)   05/29/24 85.3 kg (188 lb)          ALLERGIES: Lanolin, Cranberry extract, Doxycycline, Latex, Penicillin v, and Penicillins  PAST MEDICAL HISTORY:   Past Medical History:   Diagnosis Date     A-fib (H)      Acute diastolic heart failure (H) 06/07/2022     Acute posthemorrhagic anemia 05/17/2022     MESHA (acute kidney injury) (H24)      Anemia      Atopic keratoconjunctivitis      Atrial fibrillation (H)     Brian Moe: 8/2011 Cardioversion; CHADS2 VASC = 5; he is on warfarin and sotalol      Atrial flutter (H)      Mcgarry's esophagus 01/01/2012    per note of Dr. Tavo Mike of [facility]     Bilateral lower leg cellulitis 08/31/2018     BPH (benign prostatic hyperplasia)      Candidiasis of perineum 01/03/2018     Carotid stenosis      Carotid stenosis, asymptomatic, right      Cellulitis      CHF (congestive heart failure) (H)      Cholecystitis  10/14/2019     Cholecystitis, acute 08/18/2019     Chronic systolic heart failure (H)      Chronic venous stasis dermatitis      Closed nondisplaced intertrochanteric fracture of left femur with routine healing, subsequent encounter 05/18/2022     Clostridium difficile colitis      Contact with and (suspected) exposure to covid-19 12/13/2021     COPD (chronic obstructive pulmonary disease) (H)      Coronary artery disease due to lipid rich plaque 2000    CABG x2     Coronary atherosclerosis      Diabetes (H)      Diabetic ulcer of both feet (H) 10/31/2017     Diabetic ulcer of toe of right foot associated with diabetes mellitus due to underlying condition, limited to breakdown of skin (H) 01/05/2023     Diabetic ulcer of toe of right foot associated with diabetes mellitus due to underlying condition, with necrosis of bone (H) 01/05/2023     Dyslexia      Dyslipidemia, goal LDL below 70 2000     Epistaxis      Essential hypertension      Gangrene of left foot (H)      GERD (gastroesophageal reflux disease)      HLD (hyperlipidemia)      HTN (hypertension)      Hyponatremia      Ischemic heart disease      Lymphedema      Mild cognitive impairment      MRSA (methicillin resistant Staphylococcus aureus)      Neuropathy      Non-STEMI (non-ST elevated myocardial infarction) (H)      Occlusion and stenosis of right carotid artery      Osteoarthrosis      Osteomyelitis of ankle and foot (H)      Other atopic dermatitis      PAD (peripheral artery disease) (H24)      Peripheral vascular disease (H24)      Pneumonia 06/26/2018     Polyneuropathy due to type 2 diabetes mellitus (H)      Pressure ulcer, heel     Bilateral     Renal insufficiency      Type 2 diabetes mellitus, without long-term current use of insulin (H)      Ulcer of right foot (H)      Unable to function independently 11/13/2017      PAST SURGICAL HISTORY:  has a past surgical history that includes cabg measures grp; IR Extremity Angiogram Bilateral  (11/3/2017); Bypass graft artery coronary (N/A, 03/06/2000); Cardioversion (08/25/2011); Amputate foot (Left, 11/05/2017); Inguinal Hernia Repair (Bilateral, 1969); Cv Coronary Angiogram (N/A, 10/01/2018); Laparoscopic cholecystectomy (N/A, 08/18/2019); Bypass graft artery coronary (N/A, 10/04/2018); IR Lower Extremity Angiogram Right (1/24/2023); IR Lower Extremity Angiogram Left (3/10/2023); Amputate foot (Right, 4/27/2023); Lengthen tendon achilles (Right, 4/27/2023); Incision and drainage foot, combined (Right, 5/15/2023); Amputate foot (Right, 5/15/2023); and Incision and drainage foot, combined (Bilateral, 6/1/2023).      Current Outpatient Medications:      acetaminophen (TYLENOL) 325 MG tablet, Take 650 mg by mouth 2 times daily as needed for mild pain, Disp: , Rfl:      acetaminophen (TYLENOL) 500 MG tablet, Take 1,000 mg by mouth 2 times daily, Disp: , Rfl:      albuterol (PROAIR HFA/PROVENTIL HFA/VENTOLIN HFA) 108 (90 Base) MCG/ACT inhaler, Inhale 2 puffs into the lungs 2 times daily as needed, Disp: , Rfl:      cetirizine (ZYRTEC) 5 MG tablet, Take 5 mg by mouth daily, Disp: , Rfl:      clopidogrel (PLAVIX) 75 MG tablet, Take 75 mg by mouth daily, Disp: , Rfl:      dulaglutide (TRULICITY) 0.75 MG/0.5ML pen, Inject 1.5 mg Subcutaneous every 7 days, Disp: , Rfl:      furosemide (LASIX) 40 MG tablet, Take 40 mg by mouth daily, Disp: , Rfl:      gabapentin (NEURONTIN) 100 MG capsule, Take 200 mg by mouth 2 times daily, Disp: , Rfl:      glimepiride (AMARYL) 1 MG tablet, Take 1 mg by mouth every morning (before breakfast), Disp: , Rfl:      hypromellose (ARTIFICIAL TEARS) 0.5 % SOLN ophthalmic solution, Place 1 drop Into the left eye 4 times daily as needed for dry eyes, Disp: , Rfl:      ketoconazole (NIZORAL) 2 % external shampoo, Apply topically twice a week Mon and Fri., Disp: , Rfl:      losartan (COZAAR) 25 MG tablet, Take 12.5 mg by mouth daily, Disp: , Rfl:      naloxone (NARCAN) 0.4 MG/ML injection,  Inject 0.1-0.4 mg into the muscle once as needed for opioid reversal, Disp: , Rfl:      omeprazole (PRILOSEC) 20 MG CR capsule, Take 20 mg by mouth daily, Disp: , Rfl:      rivaroxaban ANTICOAGULANT (XARELTO) 15 MG TABS tablet, Take 20 mg by mouth daily, Disp: , Rfl:      simvastatin (ZOCOR) 40 MG tablet, Take 40 mg by mouth At Bedtime, Disp: , Rfl:      spironolactone (ALDACTONE) 25 MG tablet, Take 25 mg by mouth daily, Disp: , Rfl:      tamsulosin (FLOMAX) 0.4 MG capsule, Take 0.4 mg by mouth daily, Disp: , Rfl:      MED REC REQUIRED  Post Medication Reconciliation Status: patient was not discharged from an inpatient facility or TCU      Case Management:  I have reviewed the facility/SNF care plan/MDS, including the falls risk, nutrition and pain screening. I also reviewed the current immunizations, and preventive care.. Future cancer screening is not clinically indicated secondary to age/goals of care. Patient's desire to return to the community is not present. Current Level of Care is appropriate.mhgeroimmunization: Annual Influenza per facility protocol    Advance Directive Discussion:    I reviewed the current advanced directives as reflected in EPIC, the POLST and the facility chart, and verified the congruency of orders. I did not ontacted the first party and discussed the plan of care. I did review the advance directives with the resident.     Team Discussion:  I communicated with the appropriate disciplines involved with the Plan of Care: Nursing  ,   , and Dietitian  .   Patient's goal is: pain control and comfort.  Information reviewed: Medications, vital signs, orders, and nursing notes.    ROS:  10 point ROS of systems including Constitutional, Eyes, Respiratory, Cardiovascular, Gastroenterology, Genitourinary, Integumentary, Musculoskeletal, Psychiatric were all negative except for pertinent positives noted in my HPI.    Vitals:  /74   Pulse 72   Temp 97.9  F (36.6  C)   Resp 18   " Ht 1.778 m (5' 10\")   Wt 85.5 kg (188 lb 9.6 oz)   SpO2 98%   BMI 27.06 kg/m   Body mass index is 27.06 kg/m .  Exam:  A & O x 3, NAD. Lungs CTA, non labored. RRR, S1/S2 w/o murmur,gallop or rub.  +1 bilateral LE edema.  Abdomen soft, nontender, +BT'S. No focal neurological deficits.        Lab/Diagnostic data:   Recent labs in [facility] reviewed by me today.     ASSESSMENT/PLAN  (M79.601,  M79.602) Pain in both upper extremities  (primary encounter diagnosis)  Comment: Chronic, managed on current medications.   Plan:   Continue PRN and scheduled acetaminophen.     (Z86.73) History of CVA (cerebrovascular accident)  Comment: per history.   Plan:   Continue LTC support with medication administration, meals and safety.     (I10) Essential hypertension  Comment: Chronic, stable.   BP Readings from Last 3 Encounters:   07/09/24 135/66   06/18/24 110/74   05/30/24 128/69   Plan:   Continue lasix 40 mg/day, losartan 12.5 mg/day, and spironolactone 25 mg/day.   Monitor and make adjustments as indicated. Goal BP <150/90.     (I73.9) Peripheral vascular disease (H24)  (I87.2) Venous stasis dermatitis of both lower extremities  Comment: Chronic, wounds stable at this time.   Plan: skim monitoring and dressing changes PRN    (E11.59,  Z79.4) Type 2 diabetes mellitus with other circulatory complication, with long-term current use of insulin (H)  Comment: Chronic, stable. Last A1c 7.0  Plan:   Continue with plan of care no changes at this time, adjustment as needed    (I50.30) Heart failure with preserved ejection fraction, NYHA class II (H)  Comment: Chronic, stable. Last ECHO 4/23/24 showed EF 50-55%.   Plan:   Continue with plan of care no changes at this time, adjustment as needed    Orders:  The current medical regimen is effective; continue present plan and medications.    38 minutes spent on the date of the encounter doing chart review, history and exam, documentation and further activities as noted above. "       Electronically signed by:  Sariah Harper, MEDHAT           Sincerely,        Sariah Harper, NP

## 2024-06-18 NOTE — PROGRESS NOTES
[facility]  Chief Complaint   Patient presents with    Annual Comprehensive Nursing Home     [place] Medical Record Number:  [id number]  Place of Service where encounter took place:  [facility] () [[id number]]    HPI:    Ever Crane  is a 79 year old  (1945), who is being seen today for an annual comprehensive visit. HPI information obtained from:    Patient resting in bed eating lunch. Reports he is feeling well and is without concerns. Legs intermittently cause him problems but ok now.   Denies CP, SOB and lightheadedness.   No recent falls per report.   Recent BIMS 15/15 and PHQ-19 3/27. Mood stable.     Vital signs reviewed by me today:  BP Readings from Last 3 Encounters:   07/09/24 135/66   06/18/24 110/74   05/30/24 128/69     Pulse Readings from Last 4 Encounters:   07/09/24 78   06/18/24 72   05/30/24 76   05/29/24 98     Wt Readings from Last 4 Encounters:   07/09/24 84.8 kg (187 lb)   06/18/24 85.5 kg (188 lb 9.6 oz)   05/30/24 85.7 kg (188 lb 14.4 oz)   05/29/24 85.3 kg (188 lb)          ALLERGIES: Lanolin, Cranberry extract, Doxycycline, Latex, Penicillin v, and Penicillins  PAST MEDICAL HISTORY:   Past Medical History:   Diagnosis Date    A-fib (H)     Acute diastolic heart failure (H) 06/07/2022    Acute posthemorrhagic anemia 05/17/2022    MESHA (acute kidney injury) (H24)     Anemia     Atopic keratoconjunctivitis     Atrial fibrillation (H)     Brian Moe: 8/2011 Cardioversion; CHADS2 VASC = 5; he is on warfarin and sotalol     Atrial flutter (H)     Mcgarry's esophagus 01/01/2012    per note of Dr. Tavo Mike of [facility]    Bilateral lower leg cellulitis 08/31/2018    BPH (benign prostatic hyperplasia)     Candidiasis of perineum 01/03/2018    Carotid stenosis     Carotid stenosis, asymptomatic, right     Cellulitis     CHF (congestive heart failure) (H)     Cholecystitis 10/14/2019    Cholecystitis, acute 08/18/2019    Chronic systolic heart failure (H)     Chronic  venous stasis dermatitis     Closed nondisplaced intertrochanteric fracture of left femur with routine healing, subsequent encounter 05/18/2022    Clostridium difficile colitis     Contact with and (suspected) exposure to covid-19 12/13/2021    COPD (chronic obstructive pulmonary disease) (H)     Coronary artery disease due to lipid rich plaque 2000    CABG x2    Coronary atherosclerosis     Diabetes (H)     Diabetic ulcer of both feet (H) 10/31/2017    Diabetic ulcer of toe of right foot associated with diabetes mellitus due to underlying condition, limited to breakdown of skin (H) 01/05/2023    Diabetic ulcer of toe of right foot associated with diabetes mellitus due to underlying condition, with necrosis of bone (H) 01/05/2023    Dyslexia     Dyslipidemia, goal LDL below 70 2000    Epistaxis     Essential hypertension     Gangrene of left foot (H)     GERD (gastroesophageal reflux disease)     HLD (hyperlipidemia)     HTN (hypertension)     Hyponatremia     Ischemic heart disease     Lymphedema     Mild cognitive impairment     MRSA (methicillin resistant Staphylococcus aureus)     Neuropathy     Non-STEMI (non-ST elevated myocardial infarction) (H)     Occlusion and stenosis of right carotid artery     Osteoarthrosis     Osteomyelitis of ankle and foot (H)     Other atopic dermatitis     PAD (peripheral artery disease) (H24)     Peripheral vascular disease (H24)     Pneumonia 06/26/2018    Polyneuropathy due to type 2 diabetes mellitus (H)     Pressure ulcer, heel     Bilateral    Renal insufficiency     Type 2 diabetes mellitus, without long-term current use of insulin (H)     Ulcer of right foot (H)     Unable to function independently 11/13/2017      PAST SURGICAL HISTORY:  has a past surgical history that includes cabg measures grp; IR Extremity Angiogram Bilateral (11/3/2017); Bypass graft artery coronary (N/A, 03/06/2000); Cardioversion (08/25/2011); Amputate foot (Left, 11/05/2017); Inguinal Hernia Repair  (Bilateral, 1969); Cv Coronary Angiogram (N/A, 10/01/2018); Laparoscopic cholecystectomy (N/A, 08/18/2019); Bypass graft artery coronary (N/A, 10/04/2018); IR Lower Extremity Angiogram Right (1/24/2023); IR Lower Extremity Angiogram Left (3/10/2023); Amputate foot (Right, 4/27/2023); Lengthen tendon achilles (Right, 4/27/2023); Incision and drainage foot, combined (Right, 5/15/2023); Amputate foot (Right, 5/15/2023); and Incision and drainage foot, combined (Bilateral, 6/1/2023).      Current Outpatient Medications:     acetaminophen (TYLENOL) 325 MG tablet, Take 650 mg by mouth 2 times daily as needed for mild pain, Disp: , Rfl:     acetaminophen (TYLENOL) 500 MG tablet, Take 1,000 mg by mouth 2 times daily, Disp: , Rfl:     albuterol (PROAIR HFA/PROVENTIL HFA/VENTOLIN HFA) 108 (90 Base) MCG/ACT inhaler, Inhale 2 puffs into the lungs 2 times daily as needed, Disp: , Rfl:     cetirizine (ZYRTEC) 5 MG tablet, Take 5 mg by mouth daily, Disp: , Rfl:     clopidogrel (PLAVIX) 75 MG tablet, Take 75 mg by mouth daily, Disp: , Rfl:     dulaglutide (TRULICITY) 0.75 MG/0.5ML pen, Inject 1.5 mg Subcutaneous every 7 days, Disp: , Rfl:     furosemide (LASIX) 40 MG tablet, Take 40 mg by mouth daily, Disp: , Rfl:     gabapentin (NEURONTIN) 100 MG capsule, Take 200 mg by mouth 2 times daily, Disp: , Rfl:     glimepiride (AMARYL) 1 MG tablet, Take 1 mg by mouth every morning (before breakfast), Disp: , Rfl:     hypromellose (ARTIFICIAL TEARS) 0.5 % SOLN ophthalmic solution, Place 1 drop Into the left eye 4 times daily as needed for dry eyes, Disp: , Rfl:     ketoconazole (NIZORAL) 2 % external shampoo, Apply topically twice a week Mon and Fri., Disp: , Rfl:     losartan (COZAAR) 25 MG tablet, Take 12.5 mg by mouth daily, Disp: , Rfl:     naloxone (NARCAN) 0.4 MG/ML injection, Inject 0.1-0.4 mg into the muscle once as needed for opioid reversal, Disp: , Rfl:     omeprazole (PRILOSEC) 20 MG CR capsule, Take 20 mg by mouth daily, Disp:  ", Rfl:     rivaroxaban ANTICOAGULANT (XARELTO) 15 MG TABS tablet, Take 20 mg by mouth daily, Disp: , Rfl:     simvastatin (ZOCOR) 40 MG tablet, Take 40 mg by mouth At Bedtime, Disp: , Rfl:     spironolactone (ALDACTONE) 25 MG tablet, Take 25 mg by mouth daily, Disp: , Rfl:     tamsulosin (FLOMAX) 0.4 MG capsule, Take 0.4 mg by mouth daily, Disp: , Rfl:      MED REC REQUIRED  Post Medication Reconciliation Status: patient was not discharged from an inpatient facility or TCU      Case Management:  I have reviewed the facility/SNF care plan/MDS, including the falls risk, nutrition and pain screening. I also reviewed the current immunizations, and preventive care.. Future cancer screening is not clinically indicated secondary to age/goals of care. Patient's desire to return to the community is not present. Current Level of Care is appropriate.mhgeroimmunization: Annual Influenza per facility protocol    Advance Directive Discussion:    I reviewed the current advanced directives as reflected in EPIC, the POLST and the facility chart, and verified the congruency of orders. I did not ontacted the first party and discussed the plan of care. I did review the advance directives with the resident.     Team Discussion:  I communicated with the appropriate disciplines involved with the Plan of Care: Nursing  ,   , and Dietitian  .   Patient's goal is: pain control and comfort.  Information reviewed: Medications, vital signs, orders, and nursing notes.    ROS:  10 point ROS of systems including Constitutional, Eyes, Respiratory, Cardiovascular, Gastroenterology, Genitourinary, Integumentary, Musculoskeletal, Psychiatric were all negative except for pertinent positives noted in my HPI.    Vitals:  /74   Pulse 72   Temp 97.9  F (36.6  C)   Resp 18   Ht 1.778 m (5' 10\")   Wt 85.5 kg (188 lb 9.6 oz)   SpO2 98%   BMI 27.06 kg/m   Body mass index is 27.06 kg/m .  Exam:  A & O x 3, NAD. Lungs CTA, non labored. " RRR, S1/S2 w/o murmur,gallop or rub.  +1 bilateral LE edema.  Abdomen soft, nontender, +BT'S. No focal neurological deficits.        Lab/Diagnostic data:   Recent labs in [facility] reviewed by me today.     ASSESSMENT/PLAN  (M79.601,  M79.602) Pain in both upper extremities  (primary encounter diagnosis)  Comment: Chronic, managed on current medications.   Plan:   Continue PRN and scheduled acetaminophen.     (Z86.73) History of CVA (cerebrovascular accident)  Comment: per history.   Plan:   Continue LTC support with medication administration, meals and safety.     (I10) Essential hypertension  Comment: Chronic, stable.   BP Readings from Last 3 Encounters:   07/09/24 135/66   06/18/24 110/74   05/30/24 128/69   Plan:   Continue lasix 40 mg/day, losartan 12.5 mg/day, and spironolactone 25 mg/day.   Monitor and make adjustments as indicated. Goal BP <150/90.     (I73.9) Peripheral vascular disease (H24)  (I87.2) Venous stasis dermatitis of both lower extremities  Comment: Chronic, wounds stable at this time.   Plan: skim monitoring and dressing changes PRN    (E11.59,  Z79.4) Type 2 diabetes mellitus with other circulatory complication, with long-term current use of insulin (H)  Comment: Chronic, stable. Last A1c 7.0  Plan:   Continue with plan of care no changes at this time, adjustment as needed    (I50.30) Heart failure with preserved ejection fraction, NYHA class II (H)  Comment: Chronic, stable. Last ECHO 4/23/24 showed EF 50-55%.   Plan:   Continue with plan of care no changes at this time, adjustment as needed    Orders:  The current medical regimen is effective; continue present plan and medications.    38 minutes spent on the date of the encounter doing chart review, history and exam, documentation and further activities as noted above.       Electronically signed by:  Sariah Harper NP

## 2024-07-09 ENCOUNTER — NURSING HOME VISIT (OUTPATIENT)
Dept: GERIATRICS | Facility: CLINIC | Age: 79
End: 2024-07-09
Payer: MEDICARE

## 2024-07-09 VITALS
RESPIRATION RATE: 16 BRPM | OXYGEN SATURATION: 97 % | HEIGHT: 70 IN | SYSTOLIC BLOOD PRESSURE: 135 MMHG | WEIGHT: 187 LBS | HEART RATE: 78 BPM | BODY MASS INDEX: 26.77 KG/M2 | TEMPERATURE: 97.2 F | DIASTOLIC BLOOD PRESSURE: 66 MMHG

## 2024-07-09 DIAGNOSIS — L03.116 CELLULITIS OF LEFT LOWER LEG: Primary | ICD-10-CM

## 2024-07-09 DIAGNOSIS — L97.921: ICD-10-CM

## 2024-07-09 DIAGNOSIS — E11.69 DM TYPE 2 WITH DIABETIC MIXED HYPERLIPIDEMIA (H): Chronic | ICD-10-CM

## 2024-07-09 DIAGNOSIS — E78.2 DM TYPE 2 WITH DIABETIC MIXED HYPERLIPIDEMIA (H): Chronic | ICD-10-CM

## 2024-07-09 PROCEDURE — 99310 SBSQ NF CARE HIGH MDM 45: CPT | Performed by: NURSE PRACTITIONER

## 2024-07-09 NOTE — LETTER
" 7/9/2024      Ever Crane  [address] [address] [address]        [facility]    Chief Complaint   Patient presents with     RECHECK     HPI:  Ever Crane is a 79 year old  (1945), who is being seen today for an episodic care visit at: [facility] () [[id number]]. Today's concern is:  Patient resting in room today, staff report his left lower extremity has flared and wounds are heavily draining. Denies pain and reports this happened on and off. Nurse manager here to perform dressing changes. Area red and warm to touch.   Denies CP, SOB and lightheadedness.     BP Readings from Last 3 Encounters:   07/09/24 135/66   06/18/24 110/74   05/30/24 128/69     Pulse Readings from Last 4 Encounters:   07/09/24 78   06/18/24 72   05/30/24 76   05/29/24 98     Wt Readings from Last 4 Encounters:   07/09/24 84.8 kg (187 lb)   06/18/24 85.5 kg (188 lb 9.6 oz)   05/30/24 85.7 kg (188 lb 14.4 oz)   05/29/24 85.3 kg (188 lb)     Allergies, and PMH/PSH reviewed in EPIC today.  REVIEW OF SYSTEMS:  10 point ROS of systems including Constitutional, Eyes, Respiratory, Cardiovascular, Gastroenterology, Genitourinary, Integumentary, Musculoskeletal, Psychiatric were all negative except for pertinent positives noted in my HPI.    Objective:   /66   Pulse 78   Temp 97.2  F (36.2  C)   Resp 16   Ht 1.778 m (5' 10\")   Wt 84.8 kg (187 lb)   SpO2 97%   BMI 26.83 kg/m    A & O x 3, NAD. Lungs CTA, non labored. RRR, S1/S2 w/o murmur,gallop or rub.  +2 bilateral LE edema.  Abdomen soft, nontender, +BT'S. No focal neurological deficits. Left lower extremity erythema, warmth and pain.             Labs done in SNF are in [facility]. Please refer to them using EPIC/Care Everywhere. and Recent labs in [facility] reviewed by me today.   Most Recent 3 CBC's:  Recent Labs   Lab Test 05/29/24  1552 05/20/24  0937 05/17/24  1700   WBC 9.8 8.9 14.6*   HGB 13.4 14.4 15.2   MCV 86 86 85    225 229     Most " Recent 3 BMP's:  Recent Labs   Lab Test 05/29/24  1552 05/20/24  0937 05/17/24  1700 04/23/24  1256    137 135  --    POTASSIUM 4.6 4.4 4.6  --    CHLORIDE 102 101 99  --    CO2 23 22 20*  --    BUN 29.1* 39.0* 40.4*  --    CR 1.58* 1.56* 1.67*  --    ANIONGAP 14 14 16*  --    MELODY 9.5 10.0 9.3  --    * 114*  --  104*     Most Recent 3 Creatinines:  Recent Labs   Lab Test 05/29/24  1552 05/20/24  0937 05/17/24  1700   CR 1.58* 1.56* 1.67*     Most Recent 3 Hemoglobins:  Recent Labs   Lab Test 05/29/24  1552 05/20/24  0937 05/17/24  1700   HGB 13.4 14.4 15.2         Assessment/Plan:     Cellulitis of left lower leg  Non-pressure chronic ulcer left lower leg, limited to breakdown skin (H)  DM type 2 with diabetic mixed hyperlipidemia (H)  Provider reviewed records from facility, and interpreted most recent imaging/lab work, and vital signs.   Acute/chronic cellulitis of chronic ulcer of LLE.   Keflex 500 mg PO BID x10 days.   Clotrimazole-betamethasone 1-0.05% external cream apply to LLE every other day.   Continue dressing changes as ordered. Monitor and update NP with changes.   Chronic DM2. Last hemoglobin A1c 7.0.   Continue with plan of care no changes at this time, adjustment as needed  A1c q 6 months and PRN.         MED REC REQUIRED  Post Medication Reconciliation Status: patient was not discharged from an inpatient facility or TCU      Orders:  See new orders above.     47 minutes spent on the date of the encounter doing chart review, history and exam, documentation and further activities as noted above.       Electronically signed by:   Sariah Harper NP           Sincerely,        Sariah Harper NP

## 2024-07-09 NOTE — PROGRESS NOTES
"[facility] GERIATRICS    Chief Complaint   Patient presents with    RECHECK     HPI:  Ever Crane is a 79 year old  (1945), who is being seen today for an episodic care visit at: [facility] () [[id number]]. Today's concern is:  Patient resting in room today, staff report his left lower extremity has flared and wounds are heavily draining. Denies pain and reports this happened on and off. Nurse manager here to perform dressing changes. Area red and warm to touch.   Denies CP, SOB and lightheadedness.     BP Readings from Last 3 Encounters:   07/09/24 135/66   06/18/24 110/74   05/30/24 128/69     Pulse Readings from Last 4 Encounters:   07/09/24 78   06/18/24 72   05/30/24 76   05/29/24 98     Wt Readings from Last 4 Encounters:   07/09/24 84.8 kg (187 lb)   06/18/24 85.5 kg (188 lb 9.6 oz)   05/30/24 85.7 kg (188 lb 14.4 oz)   05/29/24 85.3 kg (188 lb)     Allergies, and PMH/PSH reviewed in EPIC today.  REVIEW OF SYSTEMS:  10 point ROS of systems including Constitutional, Eyes, Respiratory, Cardiovascular, Gastroenterology, Genitourinary, Integumentary, Musculoskeletal, Psychiatric were all negative except for pertinent positives noted in my HPI.    Objective:   /66   Pulse 78   Temp 97.2  F (36.2  C)   Resp 16   Ht 1.778 m (5' 10\")   Wt 84.8 kg (187 lb)   SpO2 97%   BMI 26.83 kg/m    A & O x 3, NAD. Lungs CTA, non labored. RRR, S1/S2 w/o murmur,gallop or rub.  +2 bilateral LE edema.  Abdomen soft, nontender, +BT'S. No focal neurological deficits. Left lower extremity erythema, warmth and pain.             Labs done in SNF are in [facility]. Please refer to them using EPIC/Care Everywhere. and Recent labs in [facility] reviewed by me today.   Most Recent 3 CBC's:  Recent Labs   Lab Test 05/29/24  1552 05/20/24  0937 05/17/24  1700   WBC 9.8 8.9 14.6*   HGB 13.4 14.4 15.2   MCV 86 86 85    225 229     Most Recent 3 BMP's:  Recent Labs   Lab Test 05/29/24  1552 05/20/24  0937 " 05/17/24  1700 04/23/24  1256    137 135  --    POTASSIUM 4.6 4.4 4.6  --    CHLORIDE 102 101 99  --    CO2 23 22 20*  --    BUN 29.1* 39.0* 40.4*  --    CR 1.58* 1.56* 1.67*  --    ANIONGAP 14 14 16*  --    MELODY 9.5 10.0 9.3  --    * 114*  --  104*     Most Recent 3 Creatinines:  Recent Labs   Lab Test 05/29/24  1552 05/20/24  0937 05/17/24  1700   CR 1.58* 1.56* 1.67*     Most Recent 3 Hemoglobins:  Recent Labs   Lab Test 05/29/24  1552 05/20/24  0937 05/17/24  1700   HGB 13.4 14.4 15.2         Assessment/Plan:     Cellulitis of left lower leg  Non-pressure chronic ulcer left lower leg, limited to breakdown skin (H)  DM type 2 with diabetic mixed hyperlipidemia (H)  Provider reviewed records from facility, and interpreted most recent imaging/lab work, and vital signs.   Acute/chronic cellulitis of chronic ulcer of LLE.   Keflex 500 mg PO BID x10 days.   Clotrimazole-betamethasone 1-0.05% external cream apply to LLE every other day.   Continue dressing changes as ordered. Monitor and update NP with changes.   Chronic DM2. Last hemoglobin A1c 7.0.   Continue with plan of care no changes at this time, adjustment as needed  A1c q 6 months and PRN.         MED REC REQUIRED  Post Medication Reconciliation Status: patient was not discharged from an inpatient facility or TCU      Orders:  See new orders above.     47 minutes spent on the date of the encounter doing chart review, history and exam, documentation and further activities as noted above.       Electronically signed by:   Sariah Harper NP

## 2024-07-22 RX ORDER — CLOTRIMAZOLE AND BETAMETHASONE DIPROPIONATE 10; .64 MG/G; MG/G
CREAM TOPICAL 2 TIMES DAILY
Status: SHIPPED
Start: 2024-07-22

## 2024-07-25 NOTE — PROGRESS NOTES
[facility]  Chief Complaint   Patient presents with    longterm Regulatory     [place] Medical Record Number:  [id number]  Place of Service where encounter took place:  [facility] () [[id number]]    HPI:    Ever Crane  is 78 year old (1945), who is being seen today for a federally mandated E/M visit.  HPI information obtained from: facility chart records, facility staff, patient report and [facility] chart review.     Today's concerns are:    - Resident seen and examined, chart reviewed and discussed with the nursing staff.   - reports feeling fine in general. The wound over left leg is getting  better. Walks using  4ww in the room.   - reports weird feeling in the both thighs and front leg. Does not walk in the [address]. Reports completed PT/OT.  - DNP and RN have no concern today.   --------------------------------    MEDICATIONS:  Current Outpatient Medications   Medication Sig Dispense Refill    acetaminophen (TYLENOL) 325 MG tablet Take 650 mg by mouth 2 times daily as needed for mild pain      acetaminophen (TYLENOL) 500 MG tablet Take 1,000 mg by mouth 2 times daily      albuterol (PROAIR HFA/PROVENTIL HFA/VENTOLIN HFA) 108 (90 Base) MCG/ACT inhaler Inhale 2 puffs into the lungs 2 times daily as needed      cetirizine (ZYRTEC) 5 MG tablet Take 5 mg by mouth daily      clopidogrel (PLAVIX) 75 MG tablet Take 75 mg by mouth daily      clotrimazole-betamethasone (LOTRISONE) 1-0.05 % external cream Apply topically 2 times daily      dulaglutide (TRULICITY) 0.75 MG/0.5ML pen Inject 1.5 mg Subcutaneous every 7 days      furosemide (LASIX) 40 MG tablet Take 40 mg by mouth daily      gabapentin (NEURONTIN) 100 MG capsule Take 200 mg by mouth 2 times daily      glimepiride (AMARYL) 1 MG tablet Take 1 mg by mouth every morning (before breakfast)      hypromellose (ARTIFICIAL TEARS) 0.5 % SOLN ophthalmic solution Place 1 drop Into the left eye 4 times daily as needed for dry eyes       "ketoconazole (NIZORAL) 2 % external shampoo Apply topically twice a week Mon and Fri.      losartan (COZAAR) 25 MG tablet Take 12.5 mg by mouth daily      naloxone (NARCAN) 0.4 MG/ML injection Inject 0.1-0.4 mg into the muscle once as needed for opioid reversal      omeprazole (PRILOSEC) 20 MG CR capsule Take 20 mg by mouth daily      rivaroxaban ANTICOAGULANT (XARELTO) 15 MG TABS tablet Take 20 mg by mouth daily      simvastatin (ZOCOR) 40 MG tablet Take 40 mg by mouth At Bedtime      spironolactone (ALDACTONE) 25 MG tablet Take 25 mg by mouth daily      tamsulosin (FLOMAX) 0.4 MG capsule Take 0.4 mg by mouth daily       Case Management:  I have reviewed the care plan and MDS and do agree with the plan. Patient's desire to return to the community is present, but is not able due to care needs . Information reviewed:  Medications, vital signs, orders, and nursing notes.    ROS: 4 point ROS including Respiratory, CV, GI and , other than that noted in the HPI,  is negative    Vitals:  /80   Pulse 65   Temp 97.5  F (36.4  C)   Resp 16   Ht 1.778 m (5' 10\")   Wt 85.8 kg (189 lb 3.2 oz)   SpO2 95%   BMI 27.15 kg/m    Body mass index is 27.15 kg/m .  Exam:  GENERAL APPEARANCE:  in no distress,   Eye: Loss of lashes on the lower part bilaterally.  Congested conjunctiva distal 1 on the left side.  Dry skin yellowish drainage on the lower eyelid.  RESP:  Unlabored breathing. CTA b/l.   CV:  S1S2 audible, regular HR, no murmur appreciated.   ABDOMEN:  soft, NT/ND, tympanic on percussion over epigastric, and LUQ. BS audible.   M/S:   amputated  distal feet  SKIN: .  Both  feet and lower legs are covered with dressing, and it is dry. The proximal part of the left leg on the medial surface are uncovered and there are multiple scab and some scaly skin that is dry.  NEURO:   No NFD appreciated on observation.   PSYCH:  affect and mood normal    Lab/Diagnostic data:  Reviewed in the chart and EHR.  "         ASSESSMENT/PLAN  --------------------------  Heart failure with preserved ejection fraction, NYHA class II (H)  Persistent atrial fibrillation (H)  Essential hypertension  - cardiac wise compensated.   - CVR, not on rate controlled.   - on Xarelto  for cva ppx.     Chronic obstructive pulmonary disease, unspecified COPD type (H)  - pulmonary wise compensated    Type 2 diabetes mellitus with other circulatory complication (H)   A1C 7.0 04/21/2024    A1C 7.5 10/16/2023   -controlled. On Trulicity.continue.       PAD (peripheral artery disease) (H)  Hx of S/P foot surgery, right  Wound over legs  4/2/237: S/P Transmetatarsal amputation of right foot, Rt  Achillis tendon lengthening, excisional debridement ulceration rt heel. Cx grew MSSA  5/15: Rt calcaneus I &D, Cx grew enterobacter, enterococcus  6/1/23:  debridement wound 1st metatarsal.   7/7: wound debrided and wound vac started.   8/2/23: ID visit, continue  dapto through 8/4. zosyn completed on 7/13  -Current wound over left leg, wound care in place.    CKD 3b (H)  - Avoid nephrotoxic  medications. Renally dose medications. Monitor electrolytes, and dehydration status    Bilateral lumbar radiculopathy:  - the weird sensation reported by the patient possibly L3 and L4 radiculopathy. Counseled to do certain exercises to increased lumber lordosis. Counseled to walk long distance if approved by PT, to strengthen his lower and BLE muscles.       Order: NNO        Electronically signed by:  Isabel Stephens MD

## 2024-07-30 ENCOUNTER — NURSING HOME VISIT (OUTPATIENT)
Dept: GERIATRICS | Facility: CLINIC | Age: 79
End: 2024-07-30
Payer: MEDICARE

## 2024-07-30 VITALS
BODY MASS INDEX: 27.09 KG/M2 | TEMPERATURE: 97.5 F | WEIGHT: 189.2 LBS | HEART RATE: 65 BPM | RESPIRATION RATE: 16 BRPM | DIASTOLIC BLOOD PRESSURE: 80 MMHG | OXYGEN SATURATION: 95 % | SYSTOLIC BLOOD PRESSURE: 131 MMHG | HEIGHT: 70 IN

## 2024-07-30 DIAGNOSIS — N18.31 STAGE 3A CHRONIC KIDNEY DISEASE (H): ICD-10-CM

## 2024-07-30 DIAGNOSIS — J44.9 CHRONIC OBSTRUCTIVE PULMONARY DISEASE, UNSPECIFIED COPD TYPE (H): ICD-10-CM

## 2024-07-30 DIAGNOSIS — I73.9 PAD (PERIPHERAL ARTERY DISEASE) (H): Chronic | ICD-10-CM

## 2024-07-30 DIAGNOSIS — M54.16 LUMBAR RADICULOPATHY: ICD-10-CM

## 2024-07-30 DIAGNOSIS — I50.30 HEART FAILURE WITH PRESERVED EJECTION FRACTION, NYHA CLASS II (H): Chronic | ICD-10-CM

## 2024-07-30 DIAGNOSIS — I10 ESSENTIAL HYPERTENSION: Chronic | ICD-10-CM

## 2024-07-30 DIAGNOSIS — E11.59 TYPE 2 DIABETES MELLITUS WITH OTHER CIRCULATORY COMPLICATIONS (H): ICD-10-CM

## 2024-07-30 DIAGNOSIS — I48.19 PERSISTENT ATRIAL FIBRILLATION (H): Primary | Chronic | ICD-10-CM

## 2024-07-30 PROCEDURE — 99309 SBSQ NF CARE MODERATE MDM 30: CPT | Performed by: FAMILY MEDICINE

## 2024-07-30 NOTE — LETTER
7/30/2024      Ever Crane  [address] [facility] [address]        [facility]  Chief Complaint   Patient presents with     senior care Regulatory     [place] Medical Record Number:  [id number]  Place of Service where encounter took place:  [facility] () [[id number]]    HPI:    Ever Crane  is 78 year old (1945), who is being seen today for a federally mandated E/M visit.  HPI information obtained from: facility chart records, facility staff, patient report and [facility] chart review.     Today's concerns are:    - Resident seen and examined, chart reviewed and discussed with the nursing staff.   - reports feeling fine in general. The wound over left leg is getting  better. Walks using  4ww in the room.   - reports weird feeling in the both thighs and front leg. Does not walk in the [address]. Reports completed PT/OT.  - DNP and RN have no concern today.   --------------------------------    MEDICATIONS:  Current Outpatient Medications   Medication Sig Dispense Refill     acetaminophen (TYLENOL) 325 MG tablet Take 650 mg by mouth 2 times daily as needed for mild pain       acetaminophen (TYLENOL) 500 MG tablet Take 1,000 mg by mouth 2 times daily       albuterol (PROAIR HFA/PROVENTIL HFA/VENTOLIN HFA) 108 (90 Base) MCG/ACT inhaler Inhale 2 puffs into the lungs 2 times daily as needed       cetirizine (ZYRTEC) 5 MG tablet Take 5 mg by mouth daily       clopidogrel (PLAVIX) 75 MG tablet Take 75 mg by mouth daily       clotrimazole-betamethasone (LOTRISONE) 1-0.05 % external cream Apply topically 2 times daily       dulaglutide (TRULICITY) 0.75 MG/0.5ML pen Inject 1.5 mg Subcutaneous every 7 days       furosemide (LASIX) 40 MG tablet Take 40 mg by mouth daily       gabapentin (NEURONTIN) 100 MG capsule Take 200 mg by mouth 2 times daily       glimepiride (AMARYL) 1 MG tablet Take 1 mg by mouth every morning (before breakfast)       hypromellose (ARTIFICIAL TEARS) 0.5 % SOLN  "ophthalmic solution Place 1 drop Into the left eye 4 times daily as needed for dry eyes       ketoconazole (NIZORAL) 2 % external shampoo Apply topically twice a week Mon and Fri.       losartan (COZAAR) 25 MG tablet Take 12.5 mg by mouth daily       naloxone (NARCAN) 0.4 MG/ML injection Inject 0.1-0.4 mg into the muscle once as needed for opioid reversal       omeprazole (PRILOSEC) 20 MG CR capsule Take 20 mg by mouth daily       rivaroxaban ANTICOAGULANT (XARELTO) 15 MG TABS tablet Take 20 mg by mouth daily       simvastatin (ZOCOR) 40 MG tablet Take 40 mg by mouth At Bedtime       spironolactone (ALDACTONE) 25 MG tablet Take 25 mg by mouth daily       tamsulosin (FLOMAX) 0.4 MG capsule Take 0.4 mg by mouth daily       Case Management:  I have reviewed the care plan and MDS and do agree with the plan. Patient's desire to return to the community is present, but is not able due to care needs . Information reviewed:  Medications, vital signs, orders, and nursing notes.    ROS: 4 point ROS including Respiratory, CV, GI and , other than that noted in the HPI,  is negative    Vitals:  /80   Pulse 65   Temp 97.5  F (36.4  C)   Resp 16   Ht 1.778 m (5' 10\")   Wt 85.8 kg (189 lb 3.2 oz)   SpO2 95%   BMI 27.15 kg/m    Body mass index is 27.15 kg/m .  Exam:  GENERAL APPEARANCE:  in no distress,   Eye: Loss of lashes on the lower part bilaterally.  Congested conjunctiva distal 1 on the left side.  Dry skin yellowish drainage on the lower eyelid.  RESP:  Unlabored breathing. CTA b/l.   CV:  S1S2 audible, regular HR, no murmur appreciated.   ABDOMEN:  soft, NT/ND, tympanic on percussion over epigastric, and LUQ. BS audible.   M/S:   amputated  distal feet  SKIN: .  Both  feet and lower legs are covered with dressing, and it is dry. The proximal part of the left leg on the medial surface are uncovered and there are multiple scab and some scaly skin that is dry.  NEURO:   No NFD appreciated on observation.   PSYCH: "  affect and mood normal    Lab/Diagnostic data:  Reviewed in the chart and EHR.          ASSESSMENT/PLAN  --------------------------  Heart failure with preserved ejection fraction, NYHA class II (H)  Persistent atrial fibrillation (H)  Essential hypertension  - cardiac wise compensated.   - CVR, not on rate controlled.   - on Xarelto  for cva ppx.     Chronic obstructive pulmonary disease, unspecified COPD type (H)  - pulmonary wise compensated    Type 2 diabetes mellitus with other circulatory complication (H)   A1C 7.0 04/21/2024    A1C 7.5 10/16/2023   -controlled. On Trulicity.continue.       PAD (peripheral artery disease) (H)  Hx of S/P foot surgery, right  Wound over legs  4/2/237: S/P Transmetatarsal amputation of right foot, Rt  Achillis tendon lengthening, excisional debridement ulceration rt heel. Cx grew MSSA  5/15: Rt calcaneus I &D, Cx grew enterobacter, enterococcus  6/1/23:  debridement wound 1st metatarsal.   7/7: wound debrided and wound vac started.   8/2/23: ID visit, continue  dapto through 8/4. zosyn completed on 7/13  -Current wound over left leg, wound care in place.    CKD 3b (H)  - Avoid nephrotoxic  medications. Renally dose medications. Monitor electrolytes, and dehydration status    Bilateral lumbar radiculopathy:  - the weird sensation reported by the patient possibly L3 and L4 radiculopathy. Counseled to do certain exercises to increased lumber lordosis. Counseled to walk long distance if approved by PT, to strengthen his lower and BLE muscles.       Order: NNO        Electronically signed by:  Isabel Stephens MD      Sincerely,        Isabel Stephens MD

## 2024-08-07 ENCOUNTER — TELEPHONE (OUTPATIENT)
Dept: GERIATRICS | Facility: CLINIC | Age: 79
End: 2024-08-07
Payer: MEDICARE

## 2024-08-07 NOTE — TELEPHONE ENCOUNTER
[facility] Geriatrics Triage Nurse Telephone Encounter    Provider: KEVIN Davidson CNP  Facility: [facility] Facility Type:  [facility]    Caller: Isela  Call Back Number: [phone]    Allergies:    Allergies   Allergen Reactions    Lanolin      Other Reaction(s): Not available    Cranberry Extract Itching and Rash    Doxycycline Rash    Latex Rash    Penicillin V Rash     Reaction occurred as a child. Patient tolerated Zosyn 6/2018, Cefazolin 10/2018, and has also tolerated Augmentin.    Penicillins Rash     Reaction occurred as a child. Patient tolerated Zosyn 6/2018, Cefazolin 10/2018, and has also tolerated Augmentin.        Reason for call: Nurse is calling to report that patient has a new o/a on his left heel measuring 0.75cm x 0.75cm.  The wound bed is beefy red.  The nurse applied skin prep, adaptic, covered with an adaptic, and then covered with Kerlix.  She also notes a fluid filled blister on the right heel measuring 4.5cm x 2cm.  Skin prep and ABD applied and secured with kerlix.  Patient is following with the in-house wound team and they'll assess on Friday.      Verbal Order/Direction given by Provider: No new orders.      Provider giving Order:  KEVIN Davidson CNP    Verbal Order given to: Isela Jarquin RN

## 2024-10-02 ENCOUNTER — NURSING HOME VISIT (OUTPATIENT)
Dept: GERIATRICS | Facility: CLINIC | Age: 79
End: 2024-10-02
Payer: MEDICARE

## 2024-10-02 VITALS
HEIGHT: 70 IN | TEMPERATURE: 97.7 F | WEIGHT: 193.3 LBS | SYSTOLIC BLOOD PRESSURE: 147 MMHG | HEART RATE: 77 BPM | DIASTOLIC BLOOD PRESSURE: 85 MMHG | RESPIRATION RATE: 18 BRPM | BODY MASS INDEX: 27.67 KG/M2 | OXYGEN SATURATION: 93 %

## 2024-10-02 DIAGNOSIS — J44.9 CHRONIC OBSTRUCTIVE PULMONARY DISEASE, UNSPECIFIED COPD TYPE (H): ICD-10-CM

## 2024-10-02 DIAGNOSIS — I50.30 HEART FAILURE WITH PRESERVED EJECTION FRACTION, NYHA CLASS II (H): ICD-10-CM

## 2024-10-02 DIAGNOSIS — I48.19 PERSISTENT ATRIAL FIBRILLATION (H): ICD-10-CM

## 2024-10-02 DIAGNOSIS — G63 POLYNEUROPATHY IN DISEASES CLASSIFIED ELSEWHERE (H): ICD-10-CM

## 2024-10-02 DIAGNOSIS — I70.25 ATHEROSCLEROSIS OF NATIVE ARTERIES OF OTHER EXTREMITIES WITH ULCERATION (H): ICD-10-CM

## 2024-10-02 PROCEDURE — 99309 SBSQ NF CARE MODERATE MDM 30: CPT | Performed by: NURSE PRACTITIONER

## 2024-10-02 NOTE — PROGRESS NOTES
[facility] GERIATRICS  Chief Complaint   Patient presents with    [facility] Regulatory     [place] Medical Record Number:  [id number]  Place of Service where encounter took place:  [facility]) [[id number]]    HPI:    Ever Crane  is 79 year old (1945), who is being seen today for a federally mandated E/M visit. 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 Denies CP, SOB and lightheadedness. Happy about current status of LE and feels they are improving with treatments.     Talked about old cars today and how he enjoyed going to car shows in the past. Reports he enjoys music and watching TV. Last BIMS 15/15 and PHQ-9 3/27.     No further concerns today.     Vital signs reviewed by me today  BP Readings from Last 3 Encounters:   10/02/24 (!) 147/85   07/30/24 131/80   07/09/24 135/66     Pulse Readings from Last 4 Encounters:   10/02/24 77   07/30/24 65   07/09/24 78   06/18/24 72     Wt Readings from Last 4 Encounters:   10/02/24 87.7 kg (193 lb 4.8 oz)   07/30/24 85.8 kg (189 lb 3.2 oz)   07/09/24 84.8 kg (187 lb)   06/18/24 85.5 kg (188 lb 9.6 oz)         ALLERGIES:Lanolin, Cranberry extract, Doxycycline, Latex, Penicillin v, and Penicillins  PAST MEDICAL HISTORY:   Past Medical History:   Diagnosis Date    A-fib (H)     Acute diastolic heart failure (H) 06/07/2022    Acute posthemorrhagic anemia 05/17/2022    MESHA (acute kidney injury) (H)     Anemia     Atopic keratoconjunctivitis     Atrial fibrillation (H)     Brian Moe: 8/2011 Cardioversion; CHADS2 VASC = 5; he is on warfarin and sotalol     Atrial flutter (H)     Mcgarry's esophagus 01/01/2012    per note of  Dr. Tavo Mike of [facility]    Bilateral lower leg cellulitis 08/31/2018    BPH (benign prostatic hyperplasia)     Candidiasis of perineum 01/03/2018    Carotid stenosis     Carotid stenosis, asymptomatic, right     Cellulitis     CHF (congestive heart failure) (H)     Cholecystitis 10/14/2019    Cholecystitis, acute 08/18/2019    Chronic systolic heart failure (H)     Chronic venous stasis dermatitis     Closed nondisplaced intertrochanteric fracture of left femur with routine healing, subsequent encounter 05/18/2022    Clostridium difficile colitis     Contact with and (suspected) exposure to covid-19 12/13/2021    COPD (chronic obstructive pulmonary disease) (H)     Coronary artery disease due to lipid rich plaque 2000    CABG x2    Coronary atherosclerosis     Diabetes (H)     Diabetic ulcer of both feet (H) 10/31/2017    Diabetic ulcer of toe of right foot associated with diabetes mellitus due to underlying condition, limited to breakdown of skin (H) 01/05/2023    Diabetic ulcer of toe of right foot associated with diabetes mellitus due to underlying condition, with necrosis of bone (H) 01/05/2023    Dyslexia     Dyslipidemia, goal LDL below 70 2000    Epistaxis     Essential hypertension     Gangrene of left foot (H)     GERD (gastroesophageal reflux disease)     HLD (hyperlipidemia)     HTN (hypertension)     Hyponatremia     Ischemic heart disease     Lymphedema     Mild cognitive impairment     MRSA (methicillin resistant Staphylococcus aureus)     Neuropathy     Non-STEMI (non-ST elevated myocardial infarction) (H)     Occlusion and stenosis of right carotid artery     Osteoarthrosis     Osteomyelitis of ankle and foot (H)     Other atopic dermatitis     PAD (peripheral artery disease) (H)     Peripheral vascular disease (H)     Pneumonia 06/26/2018    Polyneuropathy due to type 2 diabetes mellitus (H)     Pressure ulcer, heel     Bilateral    Renal insufficiency     Type 2 diabetes mellitus, without long-term  current use of insulin (H)     Ulcer of right foot (H)     Unable to function independently 11/13/2017     PAST SURGICAL HISTORY:   has a past surgical history that includes cabg measures grp; IR Extremity Angiogram Bilateral (11/3/2017); Bypass graft artery coronary (N/A, 03/06/2000); Cardioversion (08/25/2011); Amputate foot (Left, 11/05/2017); Inguinal Hernia Repair (Bilateral, 1969); Cv Coronary Angiogram (N/A, 10/01/2018); Laparoscopic cholecystectomy (N/A, 08/18/2019); Bypass graft artery coronary (N/A, 10/04/2018); IR Lower Extremity Angiogram Right (1/24/2023); IR Lower Extremity Angiogram Left (3/10/2023); Amputate foot (Right, 4/27/2023); Lengthen tendon achilles (Right, 4/27/2023); Incision and drainage foot, combined (Right, 5/15/2023); Amputate foot (Right, 5/15/2023); and Incision and drainage foot, combined (Bilateral, 6/1/2023).  FAMILY HISTORY: family history includes CABG in his father; Esophageal Cancer (age of onset: 58.00) in his father; No Known Problems in his grandchild, grandchild, sister, sister, and son; Obesity in his sister; Osteoarthritis in his sister; Sudden Death (age of onset: 85.00) in his mother; Valvular heart disease in his father.  SOCIAL HISTORY:  reports that he quit smoking about 24 years ago. His smoking use included cigarettes. He started smoking about 60 years ago. He has a 36 pack-year smoking history. He has never used smokeless tobacco. He reports that he does not currently use alcohol after a past usage of about 5.0 standard drinks of alcohol per week. He reports that he does not use drugs.    MEDICATIONS:  MED REC REQUIRED  Post Medication Reconciliation Status: patient was not discharged from an inpatient facility or TCU         Review of your medicines            Accurate as of October 2, 2024  8:49 AM. If you have any questions, ask your nurse or doctor.                CONTINUE these medicines which have NOT CHANGED        Dose / Directions   * acetaminophen 325  MG tablet  Commonly known as: TYLENOL      Dose: 650 mg  Take 650 mg by mouth 2 times daily as needed for mild pain  Refills: 0     * acetaminophen 500 MG tablet  Commonly known as: TYLENOL      Dose: 1,000 mg  Take 1,000 mg by mouth 2 times daily  Refills: 0     albuterol 108 (90 Base) MCG/ACT inhaler  Commonly known as: PROAIR HFA/PROVENTIL HFA/VENTOLIN HFA      Dose: 2 puff  Inhale 2 puffs into the lungs 2 times daily as needed  Refills: 0     cetirizine 5 MG tablet  Commonly known as: zyrTEC      Dose: 5 mg  Take 5 mg by mouth daily  Refills: 0     clopidogrel 75 MG tablet  Commonly known as: PLAVIX      Dose: 75 mg  Take 75 mg by mouth daily  Refills: 0     clotrimazole-betamethasone 1-0.05 % external cream  Commonly known as: LOTRISONE  Used for: Cellulitis of left lower leg, Non-pressure chronic ulcer left lower leg, limited to breakdown skin (H)      Apply topically 2 times daily  Refills: 0     dulaglutide 0.75 MG/0.5ML pen  Commonly known as: TRULICITY      Dose: 1.5 mg  Inject 1.5 mg Subcutaneous every 7 days  Refills: 0     furosemide 40 MG tablet  Commonly known as: LASIX      Dose: 40 mg  Take 40 mg by mouth daily  Refills: 0     gabapentin 100 MG capsule  Commonly known as: NEURONTIN      Dose: 200 mg  Take 200 mg by mouth 2 times daily  Refills: 0     glimepiride 1 MG tablet  Commonly known as: AMARYL      Dose: 1 mg  Take 1 mg by mouth every morning (before breakfast)  Refills: 0     hypromellose 0.5 % Soln ophthalmic solution  Commonly known as: ARTIFICIAL TEARS      Dose: 1 drop  Place 1 drop Into the left eye 4 times daily as needed for dry eyes  Refills: 0     ketoconazole 2 % external shampoo  Commonly known as: NIZORAL      Apply topically twice a week Mon and Fri.  Refills: 0     losartan 25 MG tablet  Commonly known as: COZAAR      Dose: 12.5 mg  Take 12.5 mg by mouth daily  Refills: 0     naloxone 0.4 MG/ML injection  Commonly known as: NARCAN      Dose: 0.1-0.4 mg  Inject 0.1-0.4 mg into  "the muscle once as needed for opioid reversal  Refills: 0     omeprazole 20 MG DR capsule  Commonly known as: PriLOSEC      Dose: 20 mg  Take 20 mg by mouth daily  Refills: 0     rivaroxaban ANTICOAGULANT 15 MG Tabs tablet  Commonly known as: XARELTO      Dose: 20 mg  Take 20 mg by mouth daily  Refills: 0     simvastatin 40 MG tablet  Commonly known as: ZOCOR      Dose: 40 mg  Take 40 mg by mouth At Bedtime  Refills: 0     spironolactone 25 MG tablet  Commonly known as: ALDACTONE      Dose: 25 mg  Take 25 mg by mouth daily  Refills: 0     tamsulosin 0.4 MG capsule  Commonly known as: FLOMAX      Dose: 0.4 mg  Take 0.4 mg by mouth daily  Refills: 0           * This list has 2 medication(s) that are the same as other medications prescribed for you. Read the directions carefully, and ask your doctor or other care provider to review them with you.                   Case Management:  I have reviewed the care plan and MDS and do agree with the plan. Patient's desire to return to the community is present, but is not able due to care needs . Information reviewed:  Medications, vital signs, orders, and nursing notes.    ROS:  10 point ROS of systems including Constitutional, Eyes, Respiratory, Cardiovascular, Gastroenterology, Genitourinary, Integumentary, Musculoskeletal, Psychiatric were all negative except for pertinent positives noted in my HPI.    Vitals:  BP (!) 147/85   Pulse 77   Temp 97.7  F (36.5  C)   Resp 18   Ht 1.778 m (5' 10\")   Wt 87.7 kg (193 lb 4.8 oz)   SpO2 93%   BMI 27.74 kg/m    Body mass index is 27.74 kg/m .  Exam:  .A & O x 3, NAD. Lungs CTA, non labored. RRR, S1/S2 w/o murmur,gallop or rub.  no edema.  Abdomen soft, nontender, +BT'S. No focal neurological deficits.  bilateral LE redness      Lab/Diagnostic data:   Recent labs in [facility] reviewed by me today.   Most Recent 3 CBC's:  Recent Labs   Lab Test 05/29/24  1552 05/20/24  0937 05/17/24  1700   WBC 9.8 8.9 14.6*   HGB 13.4 14.4 15.2   MCV " 86 86 85    225 229     Most Recent 3 BMP's:  Recent Labs   Lab Test 05/29/24  1552 05/20/24  0937 05/17/24  1700 04/23/24  1256    137 135  --    POTASSIUM 4.6 4.4 4.6  --    CHLORIDE 102 101 99  --    CO2 23 22 20*  --    BUN 29.1* 39.0* 40.4*  --    CR 1.58* 1.56* 1.67*  --    ANIONGAP 14 14 16*  --    MELODY 9.5 10.0 9.3  --    * 114*  --  104*     Most Recent 3 Creatinines:  Recent Labs   Lab Test 05/29/24  1552 05/20/24  0937 05/17/24  1700   CR 1.58* 1.56* 1.67*     Most Recent 3 Hemoglobins:  Recent Labs   Lab Test 05/29/24  1552 05/20/24  0937 05/17/24  1700   HGB 13.4 14.4 15.2     Most Recent TSH and T4:  Recent Labs   Lab Test 07/12/22  0808   TSH 3.20     Most Recent Hemoglobin A1c:  Recent Labs   Lab Test 04/21/24  1707   A1C 7.0*     Most Recent 6 glucoses:  Recent Labs   Lab Test 05/29/24  1552 05/20/24  0937 04/23/24  1256 04/23/24  0823 04/22/24  2142 04/22/24  1643   * 114* 104* 148* 154* 118*     Most Recent Anemia Panel:  Recent Labs   Lab Test 05/29/24  1552 05/26/23  0825 05/03/23  0900 03/10/23  0853 01/24/23  0719   WBC 9.8   < > 10.2   < > 8.7   HGB 13.4   < > 9.6*   < > 10.8*   HCT 41.5   < > 30.9*   < > 34.9*   MCV 86   < > 83   < > 88      < > 292   < > 174   RETICABSCT  --   --   --   --  0.069   RETP  --   --   --   --  1.8   B12  --   --  1,455*  --   --     < > = values in this interval not displayed.        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   Chronic CHF stable at this time.   Lasix 40 mg/day   Continue to monitor and update NP with changes.   Chronic A-fib.  Anticoagulated on xarelto 20 mg/day. No s/symptom of acute bleeding. Continue.   Polyneuropathy- chronic. Continue with plan of care no changes at this time, adjustment as needed      The current medical regimen is effective; continue present plan and medications.    41 minutes spent on the date of the encounter doing chart review, history and exam, documentation and further activities as noted above.         Electronically signed by:  Sariah Harper NP

## 2024-10-02 NOTE — LETTER
10/2/2024      Ever Crane  [address] [facility] [address]        [facility] GERIATRICS  Chief Complaint   Patient presents with    assisted [facility] [place] Number:  [id number]  Place of Service where encounter took place:  [facility] ([place]) [[id number]]    HPI:    Ever Crane  is 79 year old (1945), who is being seen today for a federally mandated E/M visit. Today's concerns are:  {FGS DX:648332}    ALLERGIES:Lanolin, Cranberry extract, Doxycycline, Latex, Penicillin v, and Penicillins  PAST MEDICAL HISTORY:   Past Medical History:   Diagnosis Date    A-fib (H)     Acute diastolic heart failure (H) 06/07/2022    Acute posthemorrhagic anemia 05/17/2022    MESHA (acute kidney injury) (H)     Anemia     Atopic keratoconjunctivitis     Atrial fibrillation (H)     Brian Moe: 8/2011 Cardioversion; CHADS2 VASC = 5; he is on warfarin and sotalol     Atrial flutter (H)     Mcgarry's esophagus 01/01/2012    per note of Dr. Tavo Mike of [facility]    Bilateral lower leg cellulitis 08/31/2018    BPH (benign prostatic hyperplasia)     Candidiasis of perineum 01/03/2018    Carotid stenosis     Carotid stenosis, asymptomatic, right     Cellulitis     CHF (congestive heart failure) (H)     Cholecystitis 10/14/2019    Cholecystitis, acute 08/18/2019    Chronic systolic heart failure (H)     Chronic venous stasis dermatitis     Closed nondisplaced intertrochanteric fracture of left femur with routine healing, subsequent encounter 05/18/2022    Clostridium difficile colitis     Contact with and (suspected) exposure to covid-19 12/13/2021    COPD (chronic obstructive pulmonary disease) (H)     Coronary artery disease due to lipid rich plaque 2000    CABG x2    Coronary atherosclerosis     Diabetes (H)     Diabetic ulcer of both feet (H) 10/31/2017    Diabetic ulcer of toe of right foot associated with diabetes mellitus due to underlying condition, limited to breakdown of skin (H)  01/05/2023    Diabetic ulcer of toe of right foot associated with diabetes mellitus due to underlying condition, with necrosis of bone (H) 01/05/2023    Dyslexia     Dyslipidemia, goal LDL below 70 2000    Epistaxis     Essential hypertension     Gangrene of left foot (H)     GERD (gastroesophageal reflux disease)     HLD (hyperlipidemia)     HTN (hypertension)     Hyponatremia     Ischemic heart disease     Lymphedema     Mild cognitive impairment     MRSA (methicillin resistant Staphylococcus aureus)     Neuropathy     Non-STEMI (non-ST elevated myocardial infarction) (H)     Occlusion and stenosis of right carotid artery     Osteoarthrosis     Osteomyelitis of ankle and foot (H)     Other atopic dermatitis     PAD (peripheral artery disease) (H)     Peripheral vascular disease (H)     Pneumonia 06/26/2018    Polyneuropathy due to type 2 diabetes mellitus (H)     Pressure ulcer, heel     Bilateral    Renal insufficiency     Type 2 diabetes mellitus, without long-term current use of insulin (H)     Ulcer of right foot (H)     Unable to function independently 11/13/2017     PAST SURGICAL HISTORY:   has a past surgical history that includes cabg measures grp; IR Extremity Angiogram Bilateral (11/3/2017); Bypass graft artery coronary (N/A, 03/06/2000); Cardioversion (08/25/2011); Amputate foot (Left, 11/05/2017); Inguinal Hernia Repair (Bilateral, 1969); Cv Coronary Angiogram (N/A, 10/01/2018); Laparoscopic cholecystectomy (N/A, 08/18/2019); Bypass graft artery coronary (N/A, 10/04/2018); IR Lower Extremity Angiogram Right (1/24/2023); IR Lower Extremity Angiogram Left (3/10/2023); Amputate foot (Right, 4/27/2023); Lengthen tendon achilles (Right, 4/27/2023); Incision and drainage foot, combined (Right, 5/15/2023); Amputate foot (Right, 5/15/2023); and Incision and drainage foot, combined (Bilateral, 6/1/2023).  FAMILY HISTORY: family history includes CABG in his father; Esophageal Cancer (age of onset: 58.00) in his  father; No Known Problems in his grandchild, grandchild, sister, sister, and son; Obesity in his sister; Osteoarthritis in his sister; Sudden Death (age of onset: 85.00) in his mother; Valvular heart disease in his father.  SOCIAL HISTORY:  reports that he quit smoking about 24 years ago. His smoking use included cigarettes. He started smoking about 60 years ago. He has a 36 pack-year smoking history. He has never used smokeless tobacco. He reports that he does not currently use alcohol after a past usage of about 5.0 standard drinks of alcohol per week. He reports that he does not use drugs.    MEDICATIONS:  MED REC REQUIRED{TIP  Click the link below to document or use med rec list, use list to pull in response :126061}  Post Medication Reconciliation Status: {MED REC LIST:507776}         Review of your medicines            Accurate as of October 2, 2024  8:03 AM. If you have any questions, ask your nurse or doctor.                CONTINUE these medicines which have NOT CHANGED        Dose / Directions   * acetaminophen 325 MG tablet  Commonly known as: TYLENOL      Dose: 650 mg  Take 650 mg by mouth 2 times daily as needed for mild pain  Refills: 0     * acetaminophen 500 MG tablet  Commonly known as: TYLENOL      Dose: 1,000 mg  Take 1,000 mg by mouth 2 times daily  Refills: 0     albuterol 108 (90 Base) MCG/ACT inhaler  Commonly known as: PROAIR HFA/PROVENTIL HFA/VENTOLIN HFA      Dose: 2 puff  Inhale 2 puffs into the lungs 2 times daily as needed  Refills: 0     cetirizine 5 MG tablet  Commonly known as: zyrTEC      Dose: 5 mg  Take 5 mg by mouth daily  Refills: 0     clopidogrel 75 MG tablet  Commonly known as: PLAVIX      Dose: 75 mg  Take 75 mg by mouth daily  Refills: 0     clotrimazole-betamethasone 1-0.05 % external cream  Commonly known as: LOTRISONE  Used for: Cellulitis of left lower leg, Non-pressure chronic ulcer left lower leg, limited to breakdown skin (H)      Apply topically 2 times  daily  Refills: 0     dulaglutide 0.75 MG/0.5ML pen  Commonly known as: TRULICITY      Dose: 1.5 mg  Inject 1.5 mg Subcutaneous every 7 days  Refills: 0     furosemide 40 MG tablet  Commonly known as: LASIX      Dose: 40 mg  Take 40 mg by mouth daily  Refills: 0     gabapentin 100 MG capsule  Commonly known as: NEURONTIN      Dose: 200 mg  Take 200 mg by mouth 2 times daily  Refills: 0     glimepiride 1 MG tablet  Commonly known as: AMARYL      Dose: 1 mg  Take 1 mg by mouth every morning (before breakfast)  Refills: 0     hypromellose 0.5 % Soln ophthalmic solution  Commonly known as: ARTIFICIAL TEARS      Dose: 1 drop  Place 1 drop Into the left eye 4 times daily as needed for dry eyes  Refills: 0     ketoconazole 2 % external shampoo  Commonly known as: NIZORAL      Apply topically twice a week Mon and Fri.  Refills: 0     losartan 25 MG tablet  Commonly known as: COZAAR      Dose: 12.5 mg  Take 12.5 mg by mouth daily  Refills: 0     naloxone 0.4 MG/ML injection  Commonly known as: NARCAN      Dose: 0.1-0.4 mg  Inject 0.1-0.4 mg into the muscle once as needed for opioid reversal  Refills: 0     omeprazole 20 MG DR capsule  Commonly known as: PriLOSEC      Dose: 20 mg  Take 20 mg by mouth daily  Refills: 0     rivaroxaban ANTICOAGULANT 15 MG Tabs tablet  Commonly known as: XARELTO      Dose: 20 mg  Take 20 mg by mouth daily  Refills: 0     simvastatin 40 MG tablet  Commonly known as: ZOCOR      Dose: 40 mg  Take 40 mg by mouth At Bedtime  Refills: 0     spironolactone 25 MG tablet  Commonly known as: ALDACTONE      Dose: 25 mg  Take 25 mg by mouth daily  Refills: 0     tamsulosin 0.4 MG capsule  Commonly known as: FLOMAX      Dose: 0.4 mg  Take 0.4 mg by mouth daily  Refills: 0           * This list has 2 medication(s) that are the same as other medications prescribed for you. Read the directions carefully, and ask your doctor or other care provider to review them with you.               ***    Case Management:  I  have reviewed the care plan and MDS and do agree with the plan. Patient's desire to return to the community is {FGS RETURN TO COMMUNITY:167330}. Information reviewed:  Medications, vital signs, orders, and nursing notes.    ROS:  {ROS FGS:765780}    Vitals:  There were no vitals taken for this visit.  There is no height or weight on file to calculate BMI.  Exam:  {residential physical exam :308471}    Lab/Diagnostic data:   {fgslab:774361}    ASSESSMENT/PLAN  {FGS DX2:671589}    {fgsorders:992369}  ***    Electronically signed by:  Elma Mcmillan MA***         [facility] GERIATRICS  Chief Complaint   Patient presents with    [place] [facility] [place]:  [id number]  Place of Service where encounter took place:  [facility]) [[id number]]    HPI:    Ever Crane  is 79 year old (1945), who is being seen today for a federally mandated E/M visit. Today's concerns are:  {FGS DX:300699}    ALLERGIES:Lanolin, Cranberry extract, Doxycycline, Latex, Penicillin v, and Penicillins  PAST MEDICAL HISTORY:   Past Medical History:   Diagnosis Date    A-fib (H)     Acute diastolic heart failure (H) 06/07/2022    Acute posthemorrhagic anemia 05/17/2022    MESHA (acute kidney injury) (H)     Anemia     Atopic keratoconjunctivitis     Atrial fibrillation (H)     Brian Moe: 8/2011 Cardioversion; CHADS2 VASC = 5; he is on warfarin and sotalol     Atrial flutter (H)     Mcgarry's esophagus 01/01/2012    per note of Dr. Tavo Mike of [facility]    Bilateral lower leg cellulitis 08/31/2018    BPH (benign prostatic hyperplasia)     Candidiasis of perineum 01/03/2018    Carotid stenosis     Carotid stenosis, asymptomatic, right     Cellulitis     CHF (congestive heart failure) (H)     Cholecystitis 10/14/2019    Cholecystitis, acute 08/18/2019    Chronic systolic heart failure (H)     Chronic venous stasis dermatitis     Closed nondisplaced intertrochanteric fracture of left femur with routine  healing, subsequent encounter 05/18/2022    Clostridium difficile colitis     Contact with and (suspected) exposure to covid-19 12/13/2021    COPD (chronic obstructive pulmonary disease) (H)     Coronary artery disease due to lipid rich plaque 2000    CABG x2    Coronary atherosclerosis     Diabetes (H)     Diabetic ulcer of both feet (H) 10/31/2017    Diabetic ulcer of toe of right foot associated with diabetes mellitus due to underlying condition, limited to breakdown of skin (H) 01/05/2023    Diabetic ulcer of toe of right foot associated with diabetes mellitus due to underlying condition, with necrosis of bone (H) 01/05/2023    Dyslexia     Dyslipidemia, goal LDL below 70 2000    Epistaxis     Essential hypertension     Gangrene of left foot (H)     GERD (gastroesophageal reflux disease)     HLD (hyperlipidemia)     HTN (hypertension)     Hyponatremia     Ischemic heart disease     Lymphedema     Mild cognitive impairment     MRSA (methicillin resistant Staphylococcus aureus)     Neuropathy     Non-STEMI (non-ST elevated myocardial infarction) (H)     Occlusion and stenosis of right carotid artery     Osteoarthrosis     Osteomyelitis of ankle and foot (H)     Other atopic dermatitis     PAD (peripheral artery disease) (H)     Peripheral vascular disease (H)     Pneumonia 06/26/2018    Polyneuropathy due to type 2 diabetes mellitus (H)     Pressure ulcer, heel     Bilateral    Renal insufficiency     Type 2 diabetes mellitus, without long-term current use of insulin (H)     Ulcer of right foot (H)     Unable to function independently 11/13/2017     PAST SURGICAL HISTORY:   has a past surgical history that includes cabg measures grp; IR Extremity Angiogram Bilateral (11/3/2017); Bypass graft artery coronary (N/A, 03/06/2000); Cardioversion (08/25/2011); Amputate foot (Left, 11/05/2017); Inguinal Hernia Repair (Bilateral, 1969); Cv Coronary Angiogram (N/A, 10/01/2018); Laparoscopic cholecystectomy (N/A,  08/18/2019); Bypass graft artery coronary (N/A, 10/04/2018); IR Lower Extremity Angiogram Right (1/24/2023); IR Lower Extremity Angiogram Left (3/10/2023); Amputate foot (Right, 4/27/2023); Lengthen tendon achilles (Right, 4/27/2023); Incision and drainage foot, combined (Right, 5/15/2023); Amputate foot (Right, 5/15/2023); and Incision and drainage foot, combined (Bilateral, 6/1/2023).  FAMILY HISTORY: family history includes CABG in his father; Esophageal Cancer (age of onset: 58.00) in his father; No Known Problems in his grandchild, grandchild, sister, sister, and son; Obesity in his sister; Osteoarthritis in his sister; Sudden Death (age of onset: 85.00) in his mother; Valvular heart disease in his father.  SOCIAL HISTORY:  reports that he quit smoking about 24 years ago. His smoking use included cigarettes. He started smoking about 60 years ago. He has a 36 pack-year smoking history. He has never used smokeless tobacco. He reports that he does not currently use alcohol after a past usage of about 5.0 standard drinks of alcohol per week. He reports that he does not use drugs.    MEDICATIONS:  MED REC REQUIRED{TIP  Click the link below to document or use med rec list, use list to pull in response :646356}  Post Medication Reconciliation Status: {MED REC LIST:804174}         Review of your medicines            Accurate as of October 2, 2024  8:49 AM. If you have any questions, ask your nurse or doctor.                CONTINUE these medicines which have NOT CHANGED        Dose / Directions   * acetaminophen 325 MG tablet  Commonly known as: TYLENOL      Dose: 650 mg  Take 650 mg by mouth 2 times daily as needed for mild pain  Refills: 0     * acetaminophen 500 MG tablet  Commonly known as: TYLENOL      Dose: 1,000 mg  Take 1,000 mg by mouth 2 times daily  Refills: 0     albuterol 108 (90 Base) MCG/ACT inhaler  Commonly known as: PROAIR HFA/PROVENTIL HFA/VENTOLIN HFA      Dose: 2 puff  Inhale 2 puffs into the  lungs 2 times daily as needed  Refills: 0     cetirizine 5 MG tablet  Commonly known as: zyrTEC      Dose: 5 mg  Take 5 mg by mouth daily  Refills: 0     clopidogrel 75 MG tablet  Commonly known as: PLAVIX      Dose: 75 mg  Take 75 mg by mouth daily  Refills: 0     clotrimazole-betamethasone 1-0.05 % external cream  Commonly known as: LOTRISONE  Used for: Cellulitis of left lower leg, Non-pressure chronic ulcer left lower leg, limited to breakdown skin (H)      Apply topically 2 times daily  Refills: 0     dulaglutide 0.75 MG/0.5ML pen  Commonly known as: TRULICITY      Dose: 1.5 mg  Inject 1.5 mg Subcutaneous every 7 days  Refills: 0     furosemide 40 MG tablet  Commonly known as: LASIX      Dose: 40 mg  Take 40 mg by mouth daily  Refills: 0     gabapentin 100 MG capsule  Commonly known as: NEURONTIN      Dose: 200 mg  Take 200 mg by mouth 2 times daily  Refills: 0     glimepiride 1 MG tablet  Commonly known as: AMARYL      Dose: 1 mg  Take 1 mg by mouth every morning (before breakfast)  Refills: 0     hypromellose 0.5 % Soln ophthalmic solution  Commonly known as: ARTIFICIAL TEARS      Dose: 1 drop  Place 1 drop Into the left eye 4 times daily as needed for dry eyes  Refills: 0     ketoconazole 2 % external shampoo  Commonly known as: NIZORAL      Apply topically twice a week Mon and Fri.  Refills: 0     losartan 25 MG tablet  Commonly known as: COZAAR      Dose: 12.5 mg  Take 12.5 mg by mouth daily  Refills: 0     naloxone 0.4 MG/ML injection  Commonly known as: NARCAN      Dose: 0.1-0.4 mg  Inject 0.1-0.4 mg into the muscle once as needed for opioid reversal  Refills: 0     omeprazole 20 MG DR capsule  Commonly known as: PriLOSEC      Dose: 20 mg  Take 20 mg by mouth daily  Refills: 0     rivaroxaban ANTICOAGULANT 15 MG Tabs tablet  Commonly known as: XARELTO      Dose: 20 mg  Take 20 mg by mouth daily  Refills: 0     simvastatin 40 MG tablet  Commonly known as: ZOCOR      Dose: 40 mg  Take 40 mg by mouth At  "Bedtime  Refills: 0     spironolactone 25 MG tablet  Commonly known as: ALDACTONE      Dose: 25 mg  Take 25 mg by mouth daily  Refills: 0     tamsulosin 0.4 MG capsule  Commonly known as: FLOMAX      Dose: 0.4 mg  Take 0.4 mg by mouth daily  Refills: 0           * This list has 2 medication(s) that are the same as other medications prescribed for you. Read the directions carefully, and ask your doctor or other care provider to review them with you.               ***    Case Management:  I have reviewed the care plan and MDS and do agree with the plan. Patient's desire to return to the community is {FGS RETURN TO COMMUNITY:679003}. Information reviewed:  Medications, vital signs, orders, and nursing notes.    ROS:  {ROS FGS:502170}    Vitals:  BP (!) 147/85   Pulse 77   Temp 97.7  F (36.5  C)   Resp 18   Ht 1.778 m (5' 10\")   Wt 87.7 kg (193 lb 4.8 oz)   SpO2 93%   BMI 27.74 kg/m    Body mass index is 27.74 kg/m .  Exam:  {Nursing home physical exam :087672}    Lab/Diagnostic data:   {fgslab:356166}    ASSESSMENT/PLAN  {FGS DX2:142593}    {fgsorders:944052}  ***    Electronically signed by:  Elma Mcmillan MA***            Sincerely,        Sariah Harper NP      "

## 2024-10-30 PROBLEM — I70.25: Status: ACTIVE | Noted: 2023-01-05

## 2024-12-04 NOTE — PROGRESS NOTES
[facility]  Chief Complaint   Patient presents with    detention Regulatory     [place] Medical Record Number:  [id number]  Place of Service where encounter took place:  [facility] () [[id number]]    HPI:    Ever Crane  is 79 year old (1945), who is being seen today for a federally mandated E/M visit.  HPI information obtained from: facility chart records, facility staff, patient report and [facility] chart review.     Today's concerns are:    - Resident seen and examined, chart reviewed and discussed with the nursing staff.   -Patient reported being stable however making slow progress over the time.  Reports that the wound on his right foot and leg are healed.  Still has dime sized open wound on the back of his left leg.  He is receiving wound care..  -Reports has eczema and dry skin and itchy on his hands and forearms.  -Reports history of left hip surgery and not been having right hip pain but nothing that he cannot handle.  Ask what could be done.  -Denies any chest pain, shortness of breath, wheezing, cough.  - Reports that he does go outside with his son to have food at restaurants..    - DNP and RN have no concern today.   --------------------------------    MEDICATIONS:  Current Outpatient Medications   Medication Sig Dispense Refill    acetaminophen (TYLENOL) 325 MG tablet Take 650 mg by mouth 2 times daily as needed for mild pain      acetaminophen (TYLENOL) 500 MG tablet Take 1,000 mg by mouth 2 times daily      albuterol (PROAIR HFA/PROVENTIL HFA/VENTOLIN HFA) 108 (90 Base) MCG/ACT inhaler Inhale 2 puffs into the lungs 2 times daily as needed      cetirizine (ZYRTEC) 5 MG tablet Take 5 mg by mouth daily      clopidogrel (PLAVIX) 75 MG tablet Take 75 mg by mouth daily      clotrimazole-betamethasone (LOTRISONE) 1-0.05 % external cream Apply topically 2 times daily      dulaglutide (TRULICITY) 0.75 MG/0.5ML pen Inject 1.5 mg Subcutaneous every 7 days      furosemide  "(LASIX) 40 MG tablet Take 40 mg by mouth daily      gabapentin (NEURONTIN) 100 MG capsule Take 200 mg by mouth 2 times daily      glimepiride (AMARYL) 1 MG tablet Take 1 mg by mouth every morning (before breakfast)      hypromellose (ARTIFICIAL TEARS) 0.5 % SOLN ophthalmic solution Place 1 drop Into the left eye 4 times daily as needed for dry eyes      ketoconazole (NIZORAL) 2 % external shampoo Apply topically twice a week Mon and Fri.      losartan (COZAAR) 25 MG tablet Take 12.5 mg by mouth daily      naloxone (NARCAN) 0.4 MG/ML injection Inject 0.1-0.4 mg into the muscle once as needed for opioid reversal      omeprazole (PRILOSEC) 20 MG CR capsule Take 20 mg by mouth daily      rivaroxaban ANTICOAGULANT (XARELTO) 15 MG TABS tablet Take 20 mg by mouth daily      simvastatin (ZOCOR) 40 MG tablet Take 40 mg by mouth At Bedtime      spironolactone (ALDACTONE) 25 MG tablet Take 25 mg by mouth daily      tamsulosin (FLOMAX) 0.4 MG capsule Take 0.4 mg by mouth daily       Case Management:  I have reviewed the care plan and MDS and do agree with the plan. Patient's desire to return to the community is present, but is not able due to care needs . Information reviewed:  Medications, vital signs, orders, and nursing notes.    ROS: 4 point ROS including Respiratory, CV, GI and , other than that noted in the HPI,  is negative    Vitals:  /70   Pulse 78   Temp 97  F (36.1  C)   Resp 19   Ht 1.778 m (5' 10\")   Wt 86.2 kg (190 lb 1.6 oz)   SpO2 97%   BMI 27.28 kg/m    Body mass index is 27.28 kg/m .  Exam:  GENERAL APPEARANCE:  in no distress,   Eye: Loss of lashes on the lower part bilaterally.    RESP:  Unlabored breathing. CTA b/l.   CV:  S1S2 audible, regular HR, no murmur appreciated.   ABDOMEN:  soft, NT/ND, tympanic on percussion over epigastric, and LUQ. BS audible.   M/S:   amputated  distal feet  SKIN: .  Both  feet and lower legs are covered with dressing,  NEURO:   No NFD appreciated on observation. "   PSYCH:  affect and mood normal    Lab/Diagnostic data:  Reviewed in the chart and EHR.          ASSESSMENT/PLAN  --------------------------  Heart failure with preserved ejection fraction, NYHA class II (H)  Persistent atrial fibrillation (H)  Essential hypertension  - cardiac wise compensated.   - CVR, not on rate controlled.   - on Xarelto  for cva ppx.     Chronic obstructive pulmonary disease, unspecified COPD type (H)  - pulmonary wise compensated    Type 2 diabetes mellitus with other circulatory complication (H)   A1C 7.0 04/21/2024    A1C 7.5 10/16/2023   -controlled. On Trulicity.continue.       PAD (peripheral artery disease) (H)  Hx of S/P foot surgery, right  Wound over legs  4/2/237: S/P Transmetatarsal amputation of right foot, Rt  Achillis tendon lengthening, excisional debridement ulceration rt heel. Cx grew MSSA  5/15: Rt calcaneus I &D, Cx grew enterobacter, enterococcus  6/1/23:  debridement wound 1st metatarsal.   7/7: wound debrided and wound vac started.   8/2/23: ID visit, continue  dapto through 8/4. zosyn completed on 7/13  -Current wound over left leg, wound care in place.    CKD 3b (H)  - Avoid nephrotoxic  medications. Renally dose medications. Monitor electrolytes, and dehydration status    Bilateral lumbar radiculopathy:  - the weird sensation reported by the patient possibly L3 and L4 radiculopathy. Counseled to do certain exercises to increased lumber lordosis. Counseled to walk long distance if approved by PT, to strengthen his lower and BLE muscles.     Chronic right hip pain  Hx of left femur fx s/p IM nailing (2023)  - analgesia is optimal with the current measures. If gets worse will order x-ray.       Order: NNO        Electronically signed by:  Isabel Stephens MD

## 2024-12-09 ENCOUNTER — NURSING HOME VISIT (OUTPATIENT)
Dept: GERIATRICS | Facility: CLINIC | Age: 79
End: 2024-12-09
Payer: MEDICARE

## 2024-12-09 VITALS
HEIGHT: 70 IN | DIASTOLIC BLOOD PRESSURE: 70 MMHG | TEMPERATURE: 97 F | WEIGHT: 190.1 LBS | SYSTOLIC BLOOD PRESSURE: 116 MMHG | RESPIRATION RATE: 19 BRPM | BODY MASS INDEX: 27.22 KG/M2 | HEART RATE: 78 BPM | OXYGEN SATURATION: 97 %

## 2024-12-09 DIAGNOSIS — I73.9 PAD (PERIPHERAL ARTERY DISEASE) (H): ICD-10-CM

## 2024-12-09 DIAGNOSIS — N18.31 STAGE 3A CHRONIC KIDNEY DISEASE (H): ICD-10-CM

## 2024-12-09 DIAGNOSIS — M54.16 LUMBAR RADICULOPATHY: ICD-10-CM

## 2024-12-09 DIAGNOSIS — I10 ESSENTIAL HYPERTENSION: ICD-10-CM

## 2024-12-09 DIAGNOSIS — I48.19 PERSISTENT ATRIAL FIBRILLATION (H): ICD-10-CM

## 2024-12-09 DIAGNOSIS — E11.59 TYPE 2 DIABETES MELLITUS WITH OTHER CIRCULATORY COMPLICATIONS (H): ICD-10-CM

## 2024-12-09 DIAGNOSIS — I50.30 HEART FAILURE WITH PRESERVED EJECTION FRACTION, NYHA CLASS II (H): Primary | ICD-10-CM

## 2024-12-09 DIAGNOSIS — J44.9 CHRONIC OBSTRUCTIVE PULMONARY DISEASE, UNSPECIFIED COPD TYPE (H): ICD-10-CM

## 2024-12-09 PROCEDURE — 99309 SBSQ NF CARE MODERATE MDM 30: CPT | Performed by: FAMILY MEDICINE

## 2024-12-09 NOTE — LETTER
12/9/2024      Ever Crane  [address] [facility] [address]        [place] [facility]  Chief Complaint   Patient presents with     jail Regulatory     [place] [facility] Record Number:  [id number]  Place of Service where encounter took place:  [facility] () [[id number]]    HPI:    Ever Crane  is 79 year old (1945), who is being seen today for a federally mandated E/M visit.  HPI information obtained from: facility chart records, facility staff, patient report and [facility] chart review.     Today's concerns are:    - Resident seen and examined, chart reviewed and discussed with the nursing staff.   -Patient reported being stable however making slow progress over the time.  Reports that the wound on his right foot and leg are healed.  Still has dime sized open wound on the back of his left leg.  He is receiving wound care..  -Reports has eczema and dry skin and itchy on his hands and forearms.  -Reports history of left hip surgery and not been having right hip pain but nothing that he cannot handle.  Ask what could be done.  -Denies any chest pain, shortness of breath, wheezing, cough.  - Reports that he does go outside with his son to have food at restaurants..    - DNP and RN have no concern today.   --------------------------------    MEDICATIONS:  Current Outpatient Medications   Medication Sig Dispense Refill     acetaminophen (TYLENOL) 325 MG tablet Take 650 mg by mouth 2 times daily as needed for mild pain       acetaminophen (TYLENOL) 500 MG tablet Take 1,000 mg by mouth 2 times daily       albuterol (PROAIR HFA/PROVENTIL HFA/VENTOLIN HFA) 108 (90 Base) MCG/ACT inhaler Inhale 2 puffs into the lungs 2 times daily as needed       cetirizine (ZYRTEC) 5 MG tablet Take 5 mg by mouth daily       clopidogrel (PLAVIX) 75 MG tablet Take 75 mg by mouth daily       clotrimazole-betamethasone (LOTRISONE) 1-0.05 % external cream Apply topically 2 times daily       dulaglutide  "(TRULICITY) 0.75 MG/0.5ML pen Inject 1.5 mg Subcutaneous every 7 days       furosemide (LASIX) 40 MG tablet Take 40 mg by mouth daily       gabapentin (NEURONTIN) 100 MG capsule Take 200 mg by mouth 2 times daily       glimepiride (AMARYL) 1 MG tablet Take 1 mg by mouth every morning (before breakfast)       hypromellose (ARTIFICIAL TEARS) 0.5 % SOLN ophthalmic solution Place 1 drop Into the left eye 4 times daily as needed for dry eyes       ketoconazole (NIZORAL) 2 % external shampoo Apply topically twice a week Mon and Fri.       losartan (COZAAR) 25 MG tablet Take 12.5 mg by mouth daily       naloxone (NARCAN) 0.4 MG/ML injection Inject 0.1-0.4 mg into the muscle once as needed for opioid reversal       omeprazole (PRILOSEC) 20 MG CR capsule Take 20 mg by mouth daily       rivaroxaban ANTICOAGULANT (XARELTO) 15 MG TABS tablet Take 20 mg by mouth daily       simvastatin (ZOCOR) 40 MG tablet Take 40 mg by mouth At Bedtime       spironolactone (ALDACTONE) 25 MG tablet Take 25 mg by mouth daily       tamsulosin (FLOMAX) 0.4 MG capsule Take 0.4 mg by mouth daily       Case Management:  I have reviewed the care plan and MDS and do agree with the plan. Patient's desire to return to the community is present, but is not able due to care needs . Information reviewed:  Medications, vital signs, orders, and nursing notes.    ROS: 4 point ROS including Respiratory, CV, GI and , other than that noted in the HPI,  is negative    Vitals:  /70   Pulse 78   Temp 97  F (36.1  C)   Resp 19   Ht 1.778 m (5' 10\")   Wt 86.2 kg (190 lb 1.6 oz)   SpO2 97%   BMI 27.28 kg/m    Body mass index is 27.28 kg/m .  Exam:  GENERAL APPEARANCE:  in no distress,   Eye: Loss of lashes on the lower part bilaterally.    RESP:  Unlabored breathing. CTA b/l.   CV:  S1S2 audible, regular HR, no murmur appreciated.   ABDOMEN:  soft, NT/ND, tympanic on percussion over epigastric, and LUQ. BS audible.   M/S:   amputated  distal feet  SKIN: .  " Both  feet and lower legs are covered with dressing,  NEURO:   No NFD appreciated on observation.   PSYCH:  affect and mood normal    Lab/Diagnostic data:  Reviewed in the chart and EHR.          ASSESSMENT/PLAN  --------------------------  Heart failure with preserved ejection fraction, NYHA class II (H)  Persistent atrial fibrillation (H)  Essential hypertension  - cardiac wise compensated.   - CVR, not on rate controlled.   - on Xarelto  for cva ppx.     Chronic obstructive pulmonary disease, unspecified COPD type (H)  - pulmonary wise compensated    Type 2 diabetes mellitus with other circulatory complication (H)   A1C 7.0 04/21/2024    A1C 7.5 10/16/2023   -controlled. On Trulicity.continue.       PAD (peripheral artery disease) (H)  Hx of S/P foot surgery, right  Wound over legs  4/2/237: S/P Transmetatarsal amputation of right foot, Rt  Achillis tendon lengthening, excisional debridement ulceration rt heel. Cx grew MSSA  5/15: Rt calcaneus I &D, Cx grew enterobacter, enterococcus  6/1/23:  debridement wound 1st metatarsal.   7/7: wound debrided and wound vac started.   8/2/23: ID visit, continue  dapto through 8/4. zosyn completed on 7/13  -Current wound over left leg, wound care in place.    CKD 3b (H)  - Avoid nephrotoxic  medications. Renally dose medications. Monitor electrolytes, and dehydration status    Bilateral lumbar radiculopathy:  - the weird sensation reported by the patient possibly L3 and L4 radiculopathy. Counseled to do certain exercises to increased lumber lordosis. Counseled to walk long distance if approved by PT, to strengthen his lower and BLE muscles.     Chronic right hip pain  Hx of left femur fx s/p IM nailing (2023)  - analgesia is optimal with the current measures. If gets worse will order x-ray.       Order: NNO        Electronically signed by:  Isabel Stephens MD      Sincerely,        Isabel Stephens MD

## 2025-01-16 ENCOUNTER — NURSING HOME VISIT (OUTPATIENT)
Dept: GERIATRICS | Facility: CLINIC | Age: 80
End: 2025-01-16
Payer: MEDICARE

## 2025-01-16 VITALS
BODY MASS INDEX: 27.35 KG/M2 | WEIGHT: 191 LBS | TEMPERATURE: 97.3 F | OXYGEN SATURATION: 97 % | HEIGHT: 70 IN | DIASTOLIC BLOOD PRESSURE: 84 MMHG | SYSTOLIC BLOOD PRESSURE: 120 MMHG | HEART RATE: 67 BPM | RESPIRATION RATE: 20 BRPM

## 2025-01-16 DIAGNOSIS — M25.551 ACUTE RIGHT HIP PAIN: Primary | ICD-10-CM

## 2025-01-16 DIAGNOSIS — R41.0 CONFUSION: ICD-10-CM

## 2025-01-16 DIAGNOSIS — N39.41 URGENCY INCONTINENCE: ICD-10-CM

## 2025-01-16 PROCEDURE — 99310 SBSQ NF CARE HIGH MDM 45: CPT | Performed by: NURSE PRACTITIONER

## 2025-01-16 NOTE — LETTER
1/16/2025      Ever Crane  [address] [facility] [address]        No notes on file      Sincerely,        Sariah Harper NP    Electronically signed     Component Value Date    HGB 13.4 05/29/2024    HGB 10.5 06/30/2018     Lab Results   Component Value Date    HCT 41.5 05/29/2024    HCT 30.5 06/30/2018     Lab Results   Component Value Date    MCV 86 05/29/2024    MCV 83 06/30/2018     Lab Results   Component Value Date    MCH 27.7 05/29/2024    MCH 28.6 06/30/2018     Lab Results   Component Value Date    MCHC 32.3 05/29/2024    MCHC 34.4 06/30/2018     Lab Results   Component Value Date    RDW 14.1 05/29/2024    RDW 13.7 06/30/2018     Lab Results   Component Value Date     05/29/2024     06/30/2018     Last Comprehensive Metabolic Panel:  Lab Results   Component Value Date     05/29/2024    POTASSIUM 4.6 05/29/2024    CHLORIDE 102 05/29/2024    CO2 23 05/29/2024    ANIONGAP 14 05/29/2024     (H) 05/29/2024    BUN 29.1 (H) 05/29/2024    CR 1.58 (H) 05/29/2024    GFRESTIMATED 44 (L) 05/29/2024    MELODY 9.5 05/29/2024         Assessment/Plan:  (M25.551) Acute right hip pain  (primary encounter diagnosis)  New onset of right hip pain without injury or fall.   -Right hip x-ray 2 view   -Skin check done by provider of LE bilaterally    (N39.41) Urgency incontinence  (R41.0) Confusion  New onset of worsening confusion.   UA/UC  BMP and CBC next lab day    MED REC REQUIRED  Post Medication Reconciliation Status: patient was not discharged from an inpatient facility or TCU      Orders:  Orders placed 1/17/2025  UA/UC- urgency  BMP and CBC- increased confusion  Right hip x-ray 2 view- acute right hip pain     67 minutes spent on the date of the encounter doing chart review, history and exam, documentation and further activities as noted above.     Electronically signed by: Sariah Harper NP         Orders placed 1/17/2025  UA/UC- urgency  BMP and CBC- increased confusion  Right hip x-ray 2 view- acute right hip pain    Sariah Harper NP      Sincerely,        Sariah Harper NP    Electronically signed

## 2025-01-16 NOTE — PROGRESS NOTES
"[facility]    Chief Complaint   Patient presents with    RECHECK     Right Hip Pain     HPI:  Ever Crane is a 79 year old  (1945), who is being seen today for an episodic care visit at: [facility] () [[id number]]. Today's concern is: ***    Allergies, and PMH/PSH reviewed in [facility] today.  REVIEW OF SYSTEMS:  {uicsxy90:781892}    Objective:   /84   Pulse 67   Temp 97.3  F (36.3  C)   Resp 20   Ht 1.778 m (5' 10\")   Wt 86.6 kg (191 lb)   SpO2 97%   BMI 27.41 kg/m    {Nursing home physical exam :933349}    {fgslab:122504}    Assessment/Plan:  {S DX2:623811}    MED REC REQUIRED{TIP  Click the link below to document or use med rec list, use list to pull in response :773360}  Post Medication Reconciliation Status: {MED REC LIST:506581}      Orders:  {fgsorders:034154}  ***    Electronically signed by: Elma Mcmillan MA ***     "   Component Value Date    HCT 41.5 05/29/2024    HCT 30.5 06/30/2018     Lab Results   Component Value Date    MCV 86 05/29/2024    MCV 83 06/30/2018     Lab Results   Component Value Date    MCH 27.7 05/29/2024    MCH 28.6 06/30/2018     Lab Results   Component Value Date    MCHC 32.3 05/29/2024    MCHC 34.4 06/30/2018     Lab Results   Component Value Date    RDW 14.1 05/29/2024    RDW 13.7 06/30/2018     Lab Results   Component Value Date     05/29/2024     06/30/2018     Last Comprehensive Metabolic Panel:  Lab Results   Component Value Date     05/29/2024    POTASSIUM 4.6 05/29/2024    CHLORIDE 102 05/29/2024    CO2 23 05/29/2024    ANIONGAP 14 05/29/2024     (H) 05/29/2024    BUN 29.1 (H) 05/29/2024    CR 1.58 (H) 05/29/2024    GFRESTIMATED 44 (L) 05/29/2024    MELODY 9.5 05/29/2024         Assessment/Plan:  (M25.551) Acute right hip pain  (primary encounter diagnosis)  New onset of right hip pain without injury or fall.   -Right hip x-ray 2 view   -Skin check done by provider of LE bilaterally    (N39.41) Urgency incontinence  (R41.0) Confusion  New onset of worsening confusion.   UA/UC  BMP and CBC next lab day    MED REC REQUIRED  Post Medication Reconciliation Status: patient was not discharged from an inpatient facility or TCU      Orders:  Orders placed 1/17/2025  UA/UC- urgency  BMP and CBC- increased confusion  Right hip x-ray 2 view- acute right hip pain     67 minutes spent on the date of the encounter doing chart review, history and exam, documentation and further activities as noted above.     Electronically signed by: Sariah Harper NP

## 2025-01-17 NOTE — PROGRESS NOTES
Orders placed 1/17/2025  UA/UC- urgency  BMP and CBC- increased confusion  Right hip x-ray 2 view- acute right hip pain    Sariah Harper, NP

## 2025-01-18 ENCOUNTER — LAB REQUISITION (OUTPATIENT)
Dept: LAB | Facility: CLINIC | Age: 80
End: 2025-01-18
Payer: MEDICARE

## 2025-01-18 DIAGNOSIS — N39.0 URINARY TRACT INFECTION, SITE NOT SPECIFIED: ICD-10-CM

## 2025-01-18 PROCEDURE — 81003 URINALYSIS AUTO W/O SCOPE: CPT | Mod: ORL | Performed by: FAMILY MEDICINE

## 2025-01-19 ENCOUNTER — LAB REQUISITION (OUTPATIENT)
Dept: LAB | Facility: CLINIC | Age: 80
End: 2025-01-19
Payer: MEDICARE

## 2025-01-19 DIAGNOSIS — I10 ESSENTIAL (PRIMARY) HYPERTENSION: ICD-10-CM

## 2025-01-19 DIAGNOSIS — D64.9 ANEMIA, UNSPECIFIED: ICD-10-CM

## 2025-01-19 LAB
ALBUMIN UR-MCNC: NEGATIVE MG/DL
APPEARANCE UR: CLEAR
BILIRUB UR QL STRIP: NEGATIVE
COLOR UR AUTO: NORMAL
GLUCOSE UR STRIP-MCNC: NEGATIVE MG/DL
HGB UR QL STRIP: NEGATIVE
KETONES UR STRIP-MCNC: NEGATIVE MG/DL
LEUKOCYTE ESTERASE UR QL STRIP: NEGATIVE
NITRATE UR QL: NEGATIVE
PH UR STRIP: 5 [PH] (ref 5–7)
SP GR UR STRIP: 1.01 (ref 1–1.03)
UROBILINOGEN UR STRIP-MCNC: NORMAL MG/DL

## 2025-01-20 LAB
ANION GAP SERPL CALCULATED.3IONS-SCNC: 12 MMOL/L (ref 7–15)
BUN SERPL-MCNC: 29.9 MG/DL (ref 8–23)
CALCIUM SERPL-MCNC: 9.8 MG/DL (ref 8.8–10.4)
CHLORIDE SERPL-SCNC: 98 MMOL/L (ref 98–107)
CREAT SERPL-MCNC: 1.38 MG/DL (ref 0.67–1.17)
EGFRCR SERPLBLD CKD-EPI 2021: 52 ML/MIN/1.73M2
ERYTHROCYTE [DISTWIDTH] IN BLOOD BY AUTOMATED COUNT: 13.6 % (ref 10–15)
GLUCOSE SERPL-MCNC: 150 MG/DL (ref 70–99)
HCO3 SERPL-SCNC: 25 MMOL/L (ref 22–29)
HCT VFR BLD AUTO: 44.3 % (ref 40–53)
HGB BLD-MCNC: 13.9 G/DL (ref 13.3–17.7)
MCH RBC QN AUTO: 27.6 PG (ref 26.5–33)
MCHC RBC AUTO-ENTMCNC: 31.4 G/DL (ref 31.5–36.5)
MCV RBC AUTO: 88 FL (ref 78–100)
PLATELET # BLD AUTO: 219 10E3/UL (ref 150–450)
POTASSIUM SERPL-SCNC: 4.3 MMOL/L (ref 3.4–5.3)
RBC # BLD AUTO: 5.03 10E6/UL (ref 4.4–5.9)
SODIUM SERPL-SCNC: 135 MMOL/L (ref 135–145)
WBC # BLD AUTO: 6.9 10E3/UL (ref 4–11)

## 2025-01-20 PROCEDURE — 36415 COLL VENOUS BLD VENIPUNCTURE: CPT | Mod: ORL | Performed by: NURSE PRACTITIONER

## 2025-01-20 PROCEDURE — P9604 ONE-WAY ALLOW PRORATED TRIP: HCPCS | Performed by: NURSE PRACTITIONER

## 2025-01-20 PROCEDURE — 80048 BASIC METABOLIC PNL TOTAL CA: CPT | Mod: ORL | Performed by: NURSE PRACTITIONER

## 2025-01-20 PROCEDURE — 80048 BASIC METABOLIC PNL TOTAL CA: CPT | Performed by: NURSE PRACTITIONER

## 2025-01-20 PROCEDURE — 85027 COMPLETE CBC AUTOMATED: CPT | Performed by: NURSE PRACTITIONER

## 2025-01-20 PROCEDURE — 84132 ASSAY OF SERUM POTASSIUM: CPT | Performed by: NURSE PRACTITIONER

## 2025-02-06 NOTE — PROGRESS NOTES
[facility]  Chief Complaint   Patient presents with    care home Regulatory     [place] Medical Record Number:  [id number]  Place of Service where encounter took place:  No question data found.    HPI:    Ever Crane  is 79 year old (1945), who is being seen today for a federally mandated E/M visit. Today's concerns are:  {FGS DX:146252}    ALLERGIES:Lanolin, Cranberry extract, Doxycycline, Latex, Penicillin v, and Penicillins  PAST MEDICAL HISTORY:   Past Medical History:   Diagnosis Date    A-fib (H)     Acute diastolic heart failure (H) 06/07/2022    Acute posthemorrhagic anemia 05/17/2022    MESHA (acute kidney injury)     Anemia     Atopic keratoconjunctivitis     Atrial fibrillation (H)     Brian Moe: 8/2011 Cardioversion; CHADS2 VASC = 5; he is on warfarin and sotalol     Atrial flutter (H)     Mcgarry's esophagus 01/01/2012    per note of Dr. Tavo Mike of [facility]    Bilateral lower leg cellulitis 08/31/2018    BPH (benign prostatic hyperplasia)     Candidiasis of perineum 01/03/2018    Carotid stenosis     Carotid stenosis, asymptomatic, right     Cellulitis     CHF (congestive heart failure) (H)     Cholecystitis 10/14/2019    Cholecystitis, acute 08/18/2019    Chronic systolic heart failure (H)     Chronic venous stasis dermatitis     Closed nondisplaced intertrochanteric fracture of left femur with routine healing, subsequent encounter 05/18/2022    Clostridium difficile colitis     Contact with and (suspected) exposure to covid-19 12/13/2021    COPD (chronic obstructive pulmonary disease) (H)     Coronary artery disease due to lipid rich plaque 2000    CABG x2    Coronary atherosclerosis     Diabetes (H)     Diabetic ulcer of both feet (H) 10/31/2017    Diabetic ulcer of toe of right foot associated with diabetes mellitus due to underlying condition, limited to breakdown of skin (H) 01/05/2023    Diabetic ulcer of toe of right foot associated with diabetes mellitus due to  underlying condition, with necrosis of bone (H) 01/05/2023    Dyslexia     Dyslipidemia, goal LDL below 70 2000    Epistaxis     Essential hypertension     Gangrene of left foot (H)     GERD (gastroesophageal reflux disease)     HLD (hyperlipidemia)     HTN (hypertension)     Hyponatremia     Ischemic heart disease     Lymphedema     Mild cognitive impairment     MRSA (methicillin resistant Staphylococcus aureus)     Neuropathy     Non-STEMI (non-ST elevated myocardial infarction) (H)     Occlusion and stenosis of right carotid artery     Osteoarthrosis     Osteomyelitis of ankle and foot (H)     Other atopic dermatitis     PAD (peripheral artery disease)     Peripheral vascular disease     Pneumonia 06/26/2018    Polyneuropathy due to type 2 diabetes mellitus (H)     Pressure ulcer, heel     Bilateral    Renal insufficiency     Type 2 diabetes mellitus, without long-term current use of insulin (H)     Ulcer of right foot (H)     Unable to function independently 11/13/2017     PAST SURGICAL HISTORY:   has a past surgical history that includes cabg measures grp; IR Extremity Angiogram Bilateral (11/3/2017); Bypass graft artery coronary (N/A, 03/06/2000); Cardioversion (08/25/2011); Amputate foot (Left, 11/05/2017); Inguinal Hernia Repair (Bilateral, 1969); Cv Coronary Angiogram (N/A, 10/01/2018); Laparoscopic cholecystectomy (N/A, 08/18/2019); Bypass graft artery coronary (N/A, 10/04/2018); IR Lower Extremity Angiogram Right (1/24/2023); IR Lower Extremity Angiogram Left (3/10/2023); Amputate foot (Right, 4/27/2023); Lengthen tendon achilles (Right, 4/27/2023); Incision and drainage foot, combined (Right, 5/15/2023); Amputate foot (Right, 5/15/2023); and Incision and drainage foot, combined (Bilateral, 6/1/2023).  FAMILY HISTORY: family history includes CABG in his father; Esophageal Cancer (age of onset: 58.00) in his father; No Known Problems in his grandchild, grandchild, sister, sister, and son; Obesity in his  sister; Osteoarthritis in his sister; Sudden Death (age of onset: 85.00) in his mother; Valvular heart disease in his father.  SOCIAL HISTORY:  reports that he quit smoking about 24 years ago. His smoking use included cigarettes. He started smoking about 60 years ago. He has a 36 pack-year smoking history. He has never used smokeless tobacco. He reports that he does not currently use alcohol after a past usage of about 5.0 standard drinks of alcohol per week. He reports that he does not use drugs.    MEDICATIONS:  MED REC REQUIRED{TIP  Click the link below to document or use med rec list, use list to pull in response :283407}  Post Medication Reconciliation Status: {MED REC LIST:591705}         Review of your medicines            Accurate as of February 6, 2025  2:31 PM. If you have any questions, ask your nurse or doctor.                CONTINUE these medicines which have NOT CHANGED        Dose / Directions   * acetaminophen 325 MG tablet  Commonly known as: TYLENOL      Dose: 650 mg  Take 650 mg by mouth 2 times daily as needed for mild pain  Refills: 0     * acetaminophen 500 MG tablet  Commonly known as: TYLENOL      Dose: 1,000 mg  Take 1,000 mg by mouth 2 times daily  Refills: 0     albuterol 108 (90 Base) MCG/ACT inhaler  Commonly known as: PROAIR HFA/PROVENTIL HFA/VENTOLIN HFA      Dose: 2 puff  Inhale 2 puffs into the lungs 2 times daily as needed  Refills: 0     cetirizine 5 MG tablet  Commonly known as: zyrTEC      Dose: 5 mg  Take 5 mg by mouth daily  Refills: 0     clopidogrel 75 MG tablet  Commonly known as: PLAVIX      Dose: 75 mg  Take 75 mg by mouth daily  Refills: 0     clotrimazole-betamethasone 1-0.05 % external cream  Commonly known as: LOTRISONE  Used for: Cellulitis of left lower leg, Non-pressure chronic ulcer left lower leg, limited to breakdown skin (H)      Apply topically 2 times daily  Refills: 0     dulaglutide 0.75 MG/0.5ML pen  Commonly known as: TRULICITY      Dose: 1.5 mg  Inject  1.5 mg Subcutaneous every 7 days  Refills: 0     furosemide 40 MG tablet  Commonly known as: LASIX      Dose: 40 mg  Take 40 mg by mouth daily  Refills: 0     gabapentin 100 MG capsule  Commonly known as: NEURONTIN      Dose: 200 mg  Take 200 mg by mouth 2 times daily  Refills: 0     glimepiride 1 MG tablet  Commonly known as: AMARYL      Dose: 1 mg  Take 1 mg by mouth every morning (before breakfast)  Refills: 0     hypromellose 0.5 % Soln ophthalmic solution  Commonly known as: ARTIFICIAL TEARS      Dose: 1 drop  Place 1 drop Into the left eye 4 times daily as needed for dry eyes  Refills: 0     ketoconazole 2 % external shampoo  Commonly known as: NIZORAL      Apply topically twice a week Mon and Fri.  Refills: 0     losartan 25 MG tablet  Commonly known as: COZAAR      Dose: 12.5 mg  Take 12.5 mg by mouth daily  Refills: 0     naloxone 0.4 MG/ML injection  Commonly known as: NARCAN      Dose: 0.1-0.4 mg  Inject 0.1-0.4 mg into the muscle once as needed for opioid reversal  Refills: 0     omeprazole 20 MG DR capsule  Commonly known as: PriLOSEC      Dose: 20 mg  Take 20 mg by mouth daily  Refills: 0     rivaroxaban ANTICOAGULANT 15 MG Tabs tablet  Commonly known as: XARELTO      Dose: 20 mg  Take 20 mg by mouth daily  Refills: 0     simvastatin 40 MG tablet  Commonly known as: ZOCOR      Dose: 40 mg  Take 40 mg by mouth At Bedtime  Refills: 0     spironolactone 25 MG tablet  Commonly known as: ALDACTONE      Dose: 25 mg  Take 25 mg by mouth daily  Refills: 0     tamsulosin 0.4 MG capsule  Commonly known as: FLOMAX      Dose: 0.4 mg  Take 0.4 mg by mouth daily  Refills: 0           * This list has 2 medication(s) that are the same as other medications prescribed for you. Read the directions carefully, and ask your doctor or other care provider to review them with you.               ***    Case Management:  I have reviewed the care plan and MDS and do agree with the plan. Patient's desire to return to the community  is {FGS RETURN TO COMMUNITY:362717}. Information reviewed:  Medications, vital signs, orders, and nursing notes.    ROS:  {ROS FGS:924246}    Vitals:  There were no vitals taken for this visit.  There is no height or weight on file to calculate BMI.  Exam:  {shelter physical exam :679516}    Lab/Diagnostic data:   {fgslab:578228}    ASSESSMENT/PLAN  {FGS DX2:808908}    {fgsorders:178729}  ***    Electronically signed by:  Elma Mcmillan MA***           210 225     Most Recent 3 BMP's:  Recent Labs   Lab Test 01/20/25  0907 05/29/24  1552 05/20/24  0937    139 137   POTASSIUM 4.3 4.6 4.4   CHLORIDE 98 102 101   CO2 25 23 22   BUN 29.9* 29.1* 39.0*   CR 1.38* 1.58* 1.56*   ANIONGAP 12 14 14   MELODY 9.8 9.5 10.0   * 129* 114*     Most Recent 3 Creatinines:  Recent Labs   Lab Test 01/20/25  0907 05/29/24  1552 05/20/24  0937   CR 1.38* 1.58* 1.56*     Most Recent 3 Hemoglobins:  Recent Labs   Lab Test 01/20/25  0907 05/29/24  1552 05/20/24  0937   HGB 13.9 13.4 14.4     Most Recent TSH and T4:  Recent Labs   Lab Test 07/12/22  0808   TSH 3.20     Most Recent Hemoglobin A1c:  Recent Labs   Lab Test 04/21/24  1707   A1C 7.0*         ASSESSMENT/PLAN  (Z89.439) Foot amputee, unspecified laterality (H)  Per history. Continue LTC support with transfers and safety.     (I50.30) Heart failure with preserved ejection fraction, NYHA class II (H)  Chronic, fluid level stable.   -Furosemide 40 mg/day   -Spironolactone 25 mg/day   Continue to monitor and update NP with changes.     (F03.90) Dementia, unspecified dementia severity, unspecified dementia type, unspecified whether behavioral, psychotic, or mood disturbance or anxiety (H)  Chronic, progressive.   -Continue to anticipate needs. Chronic condition, ongoing decline expected.   -Continue to provide redirection and reassurance as needed. Maintain safe living situation with goals focused on comfort.    (I48.11) Longstanding persistent atrial fibrillation (H)  Chronic, stable.   -Xarelto 20 mg/day   Pulse Readings from Last 4 Encounters:   02/07/25 76   01/16/25 67   12/09/24 78   10/02/24 77     (E11.69,  E78.2) DM type 2 with diabetic mixed hyperlipidemia (H)  Chronic, stable.   Hemoglobin A1C   Date Value Ref Range Status   04/21/2024 7.0 (H) <5.7 % Final     Comment:     Normal <5.7%   Prediabetes 5.7-6.4%    Diabetes 6.5% or higher     Note: Adopted from ADA consensus guidelines.   06/26/2018 7.7 (H)  0 - 5.6 % Final     Comment:     Normal <5.7% Prediabetes 5.7-6.4%  Diabetes 6.5% or higher - adopted from ADA   consensus guidelines.     -Trulicity   -Glimepiride   A1c yearly and PRN  Continue with plan of care no changes at this time, adjustment as needed    (N18.32) Stage 3b chronic kidney disease (H)  Chronic, stable.   Lab Results   Component Value Date    CR 1.38 01/20/2025    CR 1.20 06/30/2018   Avoid nephrotoxic medications. Renally dose medications. Monitor electrolytes, and dehydration status       The current medical regimen is effective; continue present plan and medications.    45 minutes spent on the date of the encounter doing chart review, history and exam, documentation and further activities as noted above.       Electronically signed by:  Sariah Harper NP

## 2025-02-07 ENCOUNTER — NURSING HOME VISIT (OUTPATIENT)
Dept: GERIATRICS | Facility: CLINIC | Age: 80
End: 2025-02-07
Payer: MEDICARE

## 2025-02-07 VITALS
HEIGHT: 70 IN | SYSTOLIC BLOOD PRESSURE: 123 MMHG | HEART RATE: 76 BPM | DIASTOLIC BLOOD PRESSURE: 76 MMHG | BODY MASS INDEX: 27.26 KG/M2 | RESPIRATION RATE: 18 BRPM | TEMPERATURE: 97 F | OXYGEN SATURATION: 96 % | WEIGHT: 190.4 LBS

## 2025-02-07 DIAGNOSIS — Z89.439: ICD-10-CM

## 2025-02-07 DIAGNOSIS — F03.90 DEMENTIA, UNSPECIFIED DEMENTIA SEVERITY, UNSPECIFIED DEMENTIA TYPE, UNSPECIFIED WHETHER BEHAVIORAL, PSYCHOTIC, OR MOOD DISTURBANCE OR ANXIETY (H): ICD-10-CM

## 2025-02-07 DIAGNOSIS — N18.32 STAGE 3B CHRONIC KIDNEY DISEASE (H): ICD-10-CM

## 2025-02-07 DIAGNOSIS — I48.11 LONGSTANDING PERSISTENT ATRIAL FIBRILLATION (H): ICD-10-CM

## 2025-02-07 DIAGNOSIS — E78.2 DM TYPE 2 WITH DIABETIC MIXED HYPERLIPIDEMIA (H): ICD-10-CM

## 2025-02-07 DIAGNOSIS — E11.69 DM TYPE 2 WITH DIABETIC MIXED HYPERLIPIDEMIA (H): ICD-10-CM

## 2025-02-07 DIAGNOSIS — I50.30 HEART FAILURE WITH PRESERVED EJECTION FRACTION, NYHA CLASS II (H): ICD-10-CM

## 2025-02-07 PROCEDURE — 99310 SBSQ NF CARE HIGH MDM 45: CPT | Performed by: NURSE PRACTITIONER

## 2025-02-07 NOTE — LETTER
2/7/2025      Ever Crane  [address] [facility] [address]        [facility] GERIATRICS  Chief Complaint   Patient presents with    [facility] [place] Medical Record Number:  [id number]  Place of Service where encounter took place:  No question data found.    HPI:    Ever Crane  is 79 year old (1945), who is being seen today for a federally mandated E/M visit. Today's concerns are:  {FGS DX:559219}    ALLERGIES:Lanolin, Cranberry extract, Doxycycline, Latex, Penicillin v, and Penicillins  PAST MEDICAL HISTORY:   Past Medical History:   Diagnosis Date    A-fib (H)     Acute diastolic heart failure (H) 06/07/2022    Acute posthemorrhagic anemia 05/17/2022    MESHA (acute kidney injury)     Anemia     Atopic keratoconjunctivitis     Atrial fibrillation (H)     Brian Moe: 8/2011 Cardioversion; CHADS2 VASC = 5; he is on warfarin and sotalol     Atrial flutter (H)     Mcgarry's esophagus 01/01/2012    per note of Dr. Tavo Mike of [facility]    Bilateral lower leg cellulitis 08/31/2018    BPH (benign prostatic hyperplasia)     Candidiasis of perineum 01/03/2018    Carotid stenosis     Carotid stenosis, asymptomatic, right     Cellulitis     CHF (congestive heart failure) (H)     Cholecystitis 10/14/2019    Cholecystitis, acute 08/18/2019    Chronic systolic heart failure (H)     Chronic venous stasis dermatitis     Closed nondisplaced intertrochanteric fracture of left femur with routine healing, subsequent encounter 05/18/2022    Clostridium difficile colitis     Contact with and (suspected) exposure to covid-19 12/13/2021    COPD (chronic obstructive pulmonary disease) (H)     Coronary artery disease due to lipid rich plaque 2000    CABG x2    Coronary atherosclerosis     Diabetes (H)     Diabetic ulcer of both feet (H) 10/31/2017    Diabetic ulcer of toe of right foot associated with diabetes mellitus due to underlying condition, limited to breakdown of skin (H) 01/05/2023     Diabetic ulcer of toe of right foot associated with diabetes mellitus due to underlying condition, with necrosis of bone (H) 01/05/2023    Dyslexia     Dyslipidemia, goal LDL below 70 2000    Epistaxis     Essential hypertension     Gangrene of left foot (H)     GERD (gastroesophageal reflux disease)     HLD (hyperlipidemia)     HTN (hypertension)     Hyponatremia     Ischemic heart disease     Lymphedema     Mild cognitive impairment     MRSA (methicillin resistant Staphylococcus aureus)     Neuropathy     Non-STEMI (non-ST elevated myocardial infarction) (H)     Occlusion and stenosis of right carotid artery     Osteoarthrosis     Osteomyelitis of ankle and foot (H)     Other atopic dermatitis     PAD (peripheral artery disease)     Peripheral vascular disease     Pneumonia 06/26/2018    Polyneuropathy due to type 2 diabetes mellitus (H)     Pressure ulcer, heel     Bilateral    Renal insufficiency     Type 2 diabetes mellitus, without long-term current use of insulin (H)     Ulcer of right foot (H)     Unable to function independently 11/13/2017     PAST SURGICAL HISTORY:   has a past surgical history that includes cabg measures grp; IR Extremity Angiogram Bilateral (11/3/2017); Bypass graft artery coronary (N/A, 03/06/2000); Cardioversion (08/25/2011); Amputate foot (Left, 11/05/2017); Inguinal Hernia Repair (Bilateral, 1969); Cv Coronary Angiogram (N/A, 10/01/2018); Laparoscopic cholecystectomy (N/A, 08/18/2019); Bypass graft artery coronary (N/A, 10/04/2018); IR Lower Extremity Angiogram Right (1/24/2023); IR Lower Extremity Angiogram Left (3/10/2023); Amputate foot (Right, 4/27/2023); Lengthen tendon achilles (Right, 4/27/2023); Incision and drainage foot, combined (Right, 5/15/2023); Amputate foot (Right, 5/15/2023); and Incision and drainage foot, combined (Bilateral, 6/1/2023).  FAMILY HISTORY: family history includes CABG in his father; Esophageal Cancer (age of onset: 58.00) in his father; No Known  Problems in his grandchild, grandchild, sister, sister, and son; Obesity in his sister; Osteoarthritis in his sister; Sudden Death (age of onset: 85.00) in his mother; Valvular heart disease in his father.  SOCIAL HISTORY:  reports that he quit smoking about 24 years ago. His smoking use included cigarettes. He started smoking about 60 years ago. He has a 36 pack-year smoking history. He has never used smokeless tobacco. He reports that he does not currently use alcohol after a past usage of about 5.0 standard drinks of alcohol per week. He reports that he does not use drugs.    MEDICATIONS:  MED REC REQUIRED{TIP  Click the link below to document or use med rec list, use list to pull in response :120052}  Post Medication Reconciliation Status: {MED REC LIST:745416}         Review of your medicines            Accurate as of February 6, 2025  2:31 PM. If you have any questions, ask your nurse or doctor.                CONTINUE these medicines which have NOT CHANGED        Dose / Directions   * acetaminophen 325 MG tablet  Commonly known as: TYLENOL      Dose: 650 mg  Take 650 mg by mouth 2 times daily as needed for mild pain  Refills: 0     * acetaminophen 500 MG tablet  Commonly known as: TYLENOL      Dose: 1,000 mg  Take 1,000 mg by mouth 2 times daily  Refills: 0     albuterol 108 (90 Base) MCG/ACT inhaler  Commonly known as: PROAIR HFA/PROVENTIL HFA/VENTOLIN HFA      Dose: 2 puff  Inhale 2 puffs into the lungs 2 times daily as needed  Refills: 0     cetirizine 5 MG tablet  Commonly known as: zyrTEC      Dose: 5 mg  Take 5 mg by mouth daily  Refills: 0     clopidogrel 75 MG tablet  Commonly known as: PLAVIX      Dose: 75 mg  Take 75 mg by mouth daily  Refills: 0     clotrimazole-betamethasone 1-0.05 % external cream  Commonly known as: LOTRISONE  Used for: Cellulitis of left lower leg, Non-pressure chronic ulcer left lower leg, limited to breakdown skin (H)      Apply topically 2 times daily  Refills: 0      dulaglutide 0.75 MG/0.5ML pen  Commonly known as: TRULICITY      Dose: 1.5 mg  Inject 1.5 mg Subcutaneous every 7 days  Refills: 0     furosemide 40 MG tablet  Commonly known as: LASIX      Dose: 40 mg  Take 40 mg by mouth daily  Refills: 0     gabapentin 100 MG capsule  Commonly known as: NEURONTIN      Dose: 200 mg  Take 200 mg by mouth 2 times daily  Refills: 0     glimepiride 1 MG tablet  Commonly known as: AMARYL      Dose: 1 mg  Take 1 mg by mouth every morning (before breakfast)  Refills: 0     hypromellose 0.5 % Soln ophthalmic solution  Commonly known as: ARTIFICIAL TEARS      Dose: 1 drop  Place 1 drop Into the left eye 4 times daily as needed for dry eyes  Refills: 0     ketoconazole 2 % external shampoo  Commonly known as: NIZORAL      Apply topically twice a week Mon and Fri.  Refills: 0     losartan 25 MG tablet  Commonly known as: COZAAR      Dose: 12.5 mg  Take 12.5 mg by mouth daily  Refills: 0     naloxone 0.4 MG/ML injection  Commonly known as: NARCAN      Dose: 0.1-0.4 mg  Inject 0.1-0.4 mg into the muscle once as needed for opioid reversal  Refills: 0     omeprazole 20 MG DR capsule  Commonly known as: PriLOSEC      Dose: 20 mg  Take 20 mg by mouth daily  Refills: 0     rivaroxaban ANTICOAGULANT 15 MG Tabs tablet  Commonly known as: XARELTO      Dose: 20 mg  Take 20 mg by mouth daily  Refills: 0     simvastatin 40 MG tablet  Commonly known as: ZOCOR      Dose: 40 mg  Take 40 mg by mouth At Bedtime  Refills: 0     spironolactone 25 MG tablet  Commonly known as: ALDACTONE      Dose: 25 mg  Take 25 mg by mouth daily  Refills: 0     tamsulosin 0.4 MG capsule  Commonly known as: FLOMAX      Dose: 0.4 mg  Take 0.4 mg by mouth daily  Refills: 0           * This list has 2 medication(s) that are the same as other medications prescribed for you. Read the directions carefully, and ask your doctor or other care provider to review them with you.               ***    Case Management:  I have reviewed the  care plan and MDS and do agree with the plan. Patient's desire to return to the community is {FGS RETURN TO COMMUNITY:988092}. Information reviewed:  Medications, vital signs, orders, and nursing notes.    ROS:  {ROS FGS:962459}    Vitals:  There were no vitals taken for this visit.  There is no height or weight on file to calculate BMI.  Exam:  {USP physical exam :599801}    Lab/Diagnostic data:   {fgslab:227037}    ASSESSMENT/PLAN  {FGS DX2:930358}    {fgsorders:507460}  ***    Electronically signed by:  Elma Mcmillan MA***           Sincerely,        Sariah Harper NP    Electronically signed

## 2025-04-03 ENCOUNTER — TELEPHONE (OUTPATIENT)
Dept: GERIATRICS | Facility: CLINIC | Age: 80
End: 2025-04-03
Payer: MEDICARE

## 2025-04-10 NOTE — PROGRESS NOTES
[facility] GERIATR[facility]  Chief Complaint   Patient presents with    [facility]     [place] Medical Record Number:  [id number]  Place of Service where encounter took place:  [facility] () [[id number]]    HPI:    Ever Crane  is 80 year old (1945), who is being seen today for a federally mandated E/M visit.  HPI information obtained from: facility chart records, facility staff, patient report and [facility] Epic chart review.     Today's concerns are:    - pt seen and examined. Asked why his feet do not get better. Moved to a new room, likes it.   - otherwise no changed in his routine.   - denies chest pain, Sob, palpitation, cough or wheezing  - ---------------------------------------------------------------------------------------------------------    MEDICATIONS:  Current Outpatient Medications   Medication Sig Dispense Refill    acetaminophen (TYLENOL) 325 MG tablet Take 650 mg by mouth 2 times daily as needed for mild pain      acetaminophen (TYLENOL) 500 MG tablet Take 1,000 mg by mouth 2 times daily      albuterol (PROAIR HFA/PROVENTIL HFA/VENTOLIN HFA) 108 (90 Base) MCG/ACT inhaler Inhale 2 puffs into the lungs 2 times daily as needed      cetirizine (ZYRTEC) 5 MG tablet Take 5 mg by mouth daily      clopidogrel (PLAVIX) 75 MG tablet Take 75 mg by mouth daily      clotrimazole-betamethasone (LOTRISONE) 1-0.05 % external cream Apply topically 2 times daily      furosemide (LASIX) 40 MG tablet Take 40 mg by mouth daily      gabapentin (NEURONTIN) 100 MG capsule Take 200 mg by mouth 2 times daily      glimepiride (AMARYL) 1 MG tablet Take 1 mg by mouth every morning (before breakfast)      hypromellose (ARTIFICIAL TEARS) 0.5 % SOLN ophthalmic solution Place 1 drop Into the left eye 4 times daily as needed for dry eyes      ketoconazole (NIZORAL) 2 % external shampoo Apply topically twice a week Mon and Fri.      losartan (COZAAR) 25 MG tablet Take 12.5 mg by mouth daily      naloxone  "(NARCAN) 0.4 MG/ML injection Inject 0.1-0.4 mg into the muscle once as needed for opioid reversal      omeprazole (PRILOSEC) 20 MG CR capsule Take 20 mg by mouth daily      rivaroxaban ANTICOAGULANT (XARELTO) 15 MG TABS tablet Take 20 mg by mouth daily      Semaglutide, 1 MG/DOSE, (OZEMPIC) 4 MG/3ML pen Inject 1 mg subcutaneously every 7 days.      simvastatin (ZOCOR) 40 MG tablet Take 40 mg by mouth At Bedtime      spironolactone (ALDACTONE) 25 MG tablet Take 25 mg by mouth daily      tamsulosin (FLOMAX) 0.4 MG capsule Take 0.4 mg by mouth daily         ROS: 4 point ROS including Respiratory, CV, GI and , other than that noted in the HPI,  is negative    Vitals:  /65   Pulse 87   Temp 97.5  F (36.4  C)   Resp 16   Ht 1.778 m (5' 10\")   Wt 84.7 kg (186 lb 12.8 oz)   SpO2 98%   BMI 26.80 kg/m    Body mass index is 26.8 kg/m .  Exam:  GENERAL APPEARANCE:  in no distress,   Eye: Loss of lashes on the lower part bilaterally.    RESP:  Unlabored breathing. CTA b/l.   CV:  S1S2 audible, regular HR, no murmur appreciated.   ABDOMEN:  soft, NT/ND, tympanic on percussion over epigastric, and LUQ. BS audible.   M/S:   amputated  distal feet  SKIN: .   feet  covered with dressing. Maroon like discoloration over legs.   NEURO:   No NFD appreciated on observation.   PSYCH:  affect and mood normal    Lab/Diagnostic data:  Reviewed in the chart and EHR.          ASSESSMENT/PLAN  ------------------------------  Heart failure with preserved ejection fraction, NYHA class II (H)  Persistent atrial fibrillation (H)  Essential hypertension  - cardiac wise compensated.   - CVR, not on rate controlled.   - on Xarelto  for cva ppx.       Chronic obstructive pulmonary disease, unspecified COPD type (H)  - pulmonary wise compensated      Type 2 diabetes mellitus with other circulatory complication (H)   A1C 7.0 04/21/2024    A1C 7.5 10/16/2023   -controlled. On Trulicity.continue.       PAD (peripheral artery disease) (H)  Hx " of S/P foot surgery, right  Wound over legs  4/2/23: S/P Transmetatarsal amputation of right foot, Rt  Achillis tendon lengthening, excisional debridement ulceration rt heel. Cx grew MSSA  5/15: Rt calcaneus I &D, Cx grew enterobacter, enterococcus  6/1/23:  debridement wound 1st metatarsal.   7/7/23: wound debrided and wound vac started.   8/2/23: ID visit, continue  dapto through 8/4. zosyn completed on 7/13  -Current wound over left leg, wound care in place.      CKD 3a (H)  - Avoid nephrotoxic  medications. Renally dose medications. Monitor electrolytes, and dehydration status      Bilateral lumbar radiculopathy:  - the weird sensation reported by the patient possibly L3 and L4 radiculopathy. Counseled to do certain exercises to increased lumber lordosis. Counseled to walk long distance if approved by PT, to strengthen his lower and BLE muscles.       Chronic right hip pain  Hx of left femur fx s/p IM nailing (2023)  - analgesia is optimal with the current measures. If gets worse will order x-ray.       Order: NNO        Electronically signed by:  Isabel Stephens MD

## 2025-04-13 PROBLEM — L89.623 PRESSURE ULCER OF LEFT HEEL, STAGE 3 (H): Status: RESOLVED | Noted: 2023-01-13 | Resolved: 2025-04-13

## 2025-04-13 PROBLEM — L97.511 ULCER OF RIGHT FOOT LIMITED TO BREAKDOWN OF SKIN (H): Status: RESOLVED | Noted: 2023-10-18 | Resolved: 2025-04-13

## 2025-04-13 PROBLEM — I70.25: Status: RESOLVED | Noted: 2023-01-05 | Resolved: 2025-04-13

## 2025-04-13 PROBLEM — L89.613 PRESSURE ULCER OF RIGHT HEEL, STAGE 3 (H): Status: RESOLVED | Noted: 2023-01-13 | Resolved: 2025-04-13

## 2025-04-14 ENCOUNTER — NURSING HOME VISIT (OUTPATIENT)
Dept: GERIATRICS | Facility: CLINIC | Age: 80
End: 2025-04-14
Payer: MEDICARE

## 2025-04-14 VITALS
TEMPERATURE: 97.5 F | BODY MASS INDEX: 26.74 KG/M2 | DIASTOLIC BLOOD PRESSURE: 65 MMHG | WEIGHT: 186.8 LBS | OXYGEN SATURATION: 98 % | RESPIRATION RATE: 16 BRPM | HEIGHT: 70 IN | SYSTOLIC BLOOD PRESSURE: 100 MMHG | HEART RATE: 87 BPM

## 2025-04-14 DIAGNOSIS — I10 ESSENTIAL HYPERTENSION: ICD-10-CM

## 2025-04-14 DIAGNOSIS — N18.31 STAGE 3A CHRONIC KIDNEY DISEASE (H): ICD-10-CM

## 2025-04-14 DIAGNOSIS — G89.29 CHRONIC PAIN OF RIGHT HIP: ICD-10-CM

## 2025-04-14 DIAGNOSIS — I73.9 PAD (PERIPHERAL ARTERY DISEASE): ICD-10-CM

## 2025-04-14 DIAGNOSIS — M25.551 CHRONIC PAIN OF RIGHT HIP: ICD-10-CM

## 2025-04-14 DIAGNOSIS — I50.30 HEART FAILURE WITH PRESERVED EJECTION FRACTION, NYHA CLASS II (H): Primary | ICD-10-CM

## 2025-04-14 DIAGNOSIS — I48.19 PERSISTENT ATRIAL FIBRILLATION (H): ICD-10-CM

## 2025-04-14 DIAGNOSIS — J44.9 CHRONIC OBSTRUCTIVE PULMONARY DISEASE, UNSPECIFIED COPD TYPE (H): ICD-10-CM

## 2025-04-14 DIAGNOSIS — M54.16 LUMBAR RADICULOPATHY: ICD-10-CM

## 2025-04-14 DIAGNOSIS — E11.59 TYPE 2 DIABETES MELLITUS WITH OTHER CIRCULATORY COMPLICATIONS (H): ICD-10-CM

## 2025-04-14 PROCEDURE — 99309 SBSQ NF CARE MODERATE MDM 30: CPT | Performed by: FAMILY MEDICINE

## 2025-04-14 NOTE — LETTER
4/14/2025      Ever Crane  [address] [facility] [address]        [place] GERIATRIC SERVICES  Chief Complaint   Patient presents with     custodial Regulatory     [place] Medical Record Number:  [id number]  Place of Service where encounter took place:  [facility] () [[id number]]    HPI:    Ever Crane  is 80 year old (1945), who is being seen today for a federally mandated E/M visit.  HPI information obtained from: facility chart records, facility staff, patient report and [facility] chart review.     Today's concerns are:    - pt seen and examined. Asked why his feet do not get better. Moved to a new room, likes it.   - otherwise no changed in his routine.   - denies chest pain, Sob, palpitation, cough or wheezing  - ---------------------------------------------------------------------------------------------------------    MEDICATIONS:  Current Outpatient Medications   Medication Sig Dispense Refill     acetaminophen (TYLENOL) 325 MG tablet Take 650 mg by mouth 2 times daily as needed for mild pain       acetaminophen (TYLENOL) 500 MG tablet Take 1,000 mg by mouth 2 times daily       albuterol (PROAIR HFA/PROVENTIL HFA/VENTOLIN HFA) 108 (90 Base) MCG/ACT inhaler Inhale 2 puffs into the lungs 2 times daily as needed       cetirizine (ZYRTEC) 5 MG tablet Take 5 mg by mouth daily       clopidogrel (PLAVIX) 75 MG tablet Take 75 mg by mouth daily       clotrimazole-betamethasone (LOTRISONE) 1-0.05 % external cream Apply topically 2 times daily       furosemide (LASIX) 40 MG tablet Take 40 mg by mouth daily       gabapentin (NEURONTIN) 100 MG capsule Take 200 mg by mouth 2 times daily       glimepiride (AMARYL) 1 MG tablet Take 1 mg by mouth every morning (before breakfast)       hypromellose (ARTIFICIAL TEARS) 0.5 % SOLN ophthalmic solution Place 1 drop Into the left eye 4 times daily as needed for dry eyes       ketoconazole (NIZORAL) 2 % external shampoo Apply topically twice a week Mon  "and Fri.       losartan (COZAAR) 25 MG tablet Take 12.5 mg by mouth daily       naloxone (NARCAN) 0.4 MG/ML injection Inject 0.1-0.4 mg into the muscle once as needed for opioid reversal       omeprazole (PRILOSEC) 20 MG CR capsule Take 20 mg by mouth daily       rivaroxaban ANTICOAGULANT (XARELTO) 15 MG TABS tablet Take 20 mg by mouth daily       Semaglutide, 1 MG/DOSE, (OZEMPIC) 4 MG/3ML pen Inject 1 mg subcutaneously every 7 days.       simvastatin (ZOCOR) 40 MG tablet Take 40 mg by mouth At Bedtime       spironolactone (ALDACTONE) 25 MG tablet Take 25 mg by mouth daily       tamsulosin (FLOMAX) 0.4 MG capsule Take 0.4 mg by mouth daily         ROS: 4 point ROS including Respiratory, CV, GI and , other than that noted in the HPI,  is negative    Vitals:  /65   Pulse 87   Temp 97.5  F (36.4  C)   Resp 16   Ht 1.778 m (5' 10\")   Wt 84.7 kg (186 lb 12.8 oz)   SpO2 98%   BMI 26.80 kg/m    Body mass index is 26.8 kg/m .  Exam:  GENERAL APPEARANCE:  in no distress,   Eye: Loss of lashes on the lower part bilaterally.    RESP:  Unlabored breathing. CTA b/l.   CV:  S1S2 audible, regular HR, no murmur appreciated.   ABDOMEN:  soft, NT/ND, tympanic on percussion over epigastric, and LUQ. BS audible.   M/S:   amputated  distal feet  SKIN: .   feet  covered with dressing. Maroon like discoloration over legs.   NEURO:   No NFD appreciated on observation.   PSYCH:  affect and mood normal    Lab/Diagnostic data:  Reviewed in the chart and EHR.          ASSESSMENT/PLAN  ------------------------------  Heart failure with preserved ejection fraction, NYHA class II (H)  Persistent atrial fibrillation (H)  Essential hypertension  - cardiac wise compensated.   - CVR, not on rate controlled.   - on Xarelto  for cva ppx.       Chronic obstructive pulmonary disease, unspecified COPD type (H)  - pulmonary wise compensated      Type 2 diabetes mellitus with other circulatory complication (H)   A1C 7.0 04/21/2024    A1C 7.5 " 10/16/2023   -controlled. On Trulicity.continue.       PAD (peripheral artery disease) (H)  Hx of S/P foot surgery, right  Wound over legs  4/2/23: S/P Transmetatarsal amputation of right foot, Rt  Achillis tendon lengthening, excisional debridement ulceration rt heel. Cx grew MSSA  5/15: Rt calcaneus I &D, Cx grew enterobacter, enterococcus  6/1/23:  debridement wound 1st metatarsal.   7/7/23: wound debrided and wound vac started.   8/2/23: ID visit, continue  dapto through 8/4. zosyn completed on 7/13  -Current wound over left leg, wound care in place.      CKD 3a (H)  - Avoid nephrotoxic  medications. Renally dose medications. Monitor electrolytes, and dehydration status      Bilateral lumbar radiculopathy:  - the weird sensation reported by the patient possibly L3 and L4 radiculopathy. Counseled to do certain exercises to increased lumber lordosis. Counseled to walk long distance if approved by PT, to strengthen his lower and BLE muscles.       Chronic right hip pain  Hx of left femur fx s/p IM nailing (2023)  - analgesia is optimal with the current measures. If gets worse will order x-ray.       Order: NNO        Electronically signed by:  Isabel Stephens MD        Sincerely,        Isabel Stephens MD    Electronically signed

## 2025-05-21 ENCOUNTER — APPOINTMENT (OUTPATIENT)
Dept: RADIOLOGY | Facility: HOSPITAL | Age: 80
End: 2025-05-21
Attending: EMERGENCY MEDICINE
Payer: MEDICARE

## 2025-05-21 ENCOUNTER — HOSPITAL ENCOUNTER (INPATIENT)
Facility: HOSPITAL | Age: 80
End: 2025-05-21
Attending: EMERGENCY MEDICINE
Payer: MEDICARE

## 2025-05-21 ENCOUNTER — TELEPHONE (OUTPATIENT)
Dept: GERIATRICS | Facility: CLINIC | Age: 80
End: 2025-05-21
Payer: MEDICARE

## 2025-05-21 DIAGNOSIS — I87.2 VENOUS STASIS DERMATITIS: Primary | ICD-10-CM

## 2025-05-21 DIAGNOSIS — K22.70 BARRETT'S ESOPHAGUS WITHOUT DYSPLASIA: ICD-10-CM

## 2025-05-21 DIAGNOSIS — L03.115 CELLULITIS OF RIGHT LOWER EXTREMITY: ICD-10-CM

## 2025-05-21 DIAGNOSIS — I48.19 PERSISTENT ATRIAL FIBRILLATION (H): Chronic | ICD-10-CM

## 2025-05-21 LAB
ANION GAP SERPL CALCULATED.3IONS-SCNC: 14 MMOL/L (ref 7–15)
BASOPHILS # BLD AUTO: 0.1 10E3/UL (ref 0–0.2)
BASOPHILS NFR BLD AUTO: 1 %
BUN SERPL-MCNC: 40.9 MG/DL (ref 8–23)
CALCIUM SERPL-MCNC: 9.7 MG/DL (ref 8.8–10.4)
CHLORIDE SERPL-SCNC: 97 MMOL/L (ref 98–107)
CREAT SERPL-MCNC: 1.87 MG/DL (ref 0.67–1.17)
CRP SERPL-MCNC: 131.5 MG/L
EGFRCR SERPLBLD CKD-EPI 2021: 36 ML/MIN/1.73M2
EOSINOPHIL # BLD AUTO: 0.2 10E3/UL (ref 0–0.7)
EOSINOPHIL NFR BLD AUTO: 2 %
ERYTHROCYTE [DISTWIDTH] IN BLOOD BY AUTOMATED COUNT: 13.8 % (ref 10–15)
ERYTHROCYTE [SEDIMENTATION RATE] IN BLOOD BY WESTERGREN METHOD: 54 MM/HR (ref 0–20)
GLUCOSE SERPL-MCNC: 131 MG/DL (ref 70–99)
HCO3 SERPL-SCNC: 25 MMOL/L (ref 22–29)
HCT VFR BLD AUTO: 38.4 % (ref 40–53)
HGB BLD-MCNC: 12.8 G/DL (ref 13.3–17.7)
IMM GRANULOCYTES # BLD: 0 10E3/UL
IMM GRANULOCYTES NFR BLD: 0 %
LYMPHOCYTES # BLD AUTO: 1 10E3/UL (ref 0.8–5.3)
LYMPHOCYTES NFR BLD AUTO: 10 %
MCH RBC QN AUTO: 27.7 PG (ref 26.5–33)
MCHC RBC AUTO-ENTMCNC: 33.3 G/DL (ref 31.5–36.5)
MCV RBC AUTO: 83 FL (ref 78–100)
MONOCYTES # BLD AUTO: 0.9 10E3/UL (ref 0–1.3)
MONOCYTES NFR BLD AUTO: 10 %
NEUTROPHILS # BLD AUTO: 7.3 10E3/UL (ref 1.6–8.3)
NEUTROPHILS NFR BLD AUTO: 77 %
NRBC # BLD AUTO: 0 10E3/UL
NRBC BLD AUTO-RTO: 0 /100
PLATELET # BLD AUTO: 221 10E3/UL (ref 150–450)
POTASSIUM SERPL-SCNC: 4.3 MMOL/L (ref 3.4–5.3)
RBC # BLD AUTO: 4.62 10E6/UL (ref 4.4–5.9)
SODIUM SERPL-SCNC: 136 MMOL/L (ref 135–145)
WBC # BLD AUTO: 9.4 10E3/UL (ref 4–11)

## 2025-05-21 PROCEDURE — 84132 ASSAY OF SERUM POTASSIUM: CPT | Performed by: EMERGENCY MEDICINE

## 2025-05-21 PROCEDURE — 86140 C-REACTIVE PROTEIN: CPT | Performed by: EMERGENCY MEDICINE

## 2025-05-21 PROCEDURE — 85652 RBC SED RATE AUTOMATED: CPT | Performed by: EMERGENCY MEDICINE

## 2025-05-21 PROCEDURE — 96365 THER/PROPH/DIAG IV INF INIT: CPT

## 2025-05-21 PROCEDURE — 85041 AUTOMATED RBC COUNT: CPT | Performed by: EMERGENCY MEDICINE

## 2025-05-21 PROCEDURE — 87040 BLOOD CULTURE FOR BACTERIA: CPT | Performed by: EMERGENCY MEDICINE

## 2025-05-21 PROCEDURE — 83036 HEMOGLOBIN GLYCOSYLATED A1C: CPT | Performed by: HOSPITALIST

## 2025-05-21 PROCEDURE — 99285 EMERGENCY DEPT VISIT HI MDM: CPT | Mod: 25

## 2025-05-21 PROCEDURE — 36415 COLL VENOUS BLD VENIPUNCTURE: CPT | Performed by: EMERGENCY MEDICINE

## 2025-05-21 PROCEDURE — 258N000003 HC RX IP 258 OP 636: Performed by: EMERGENCY MEDICINE

## 2025-05-21 PROCEDURE — 250N000011 HC RX IP 250 OP 636: Performed by: EMERGENCY MEDICINE

## 2025-05-21 PROCEDURE — 73552 X-RAY EXAM OF FEMUR 2/>: CPT | Mod: RT

## 2025-05-21 PROCEDURE — 73590 X-RAY EXAM OF LOWER LEG: CPT | Mod: RT

## 2025-05-21 PROCEDURE — 120N000001 HC R&B MED SURG/OB

## 2025-05-21 RX ORDER — CEFEPIME HYDROCHLORIDE 2 G/1
2 INJECTION, POWDER, FOR SOLUTION INTRAVENOUS ONCE
Status: COMPLETED | OUTPATIENT
Start: 2025-05-21 | End: 2025-05-21

## 2025-05-21 RX ADMIN — CEFEPIME HYDROCHLORIDE 2 G: 2 INJECTION, POWDER, FOR SOLUTION INTRAVENOUS at 22:15

## 2025-05-21 RX ADMIN — SODIUM CHLORIDE 1500 MG: 0.9 INJECTION, SOLUTION INTRAVENOUS at 22:55

## 2025-05-21 RX ADMIN — SODIUM CHLORIDE 500 ML: 0.9 INJECTION, SOLUTION INTRAVENOUS at 22:18

## 2025-05-21 ASSESSMENT — COLUMBIA-SUICIDE SEVERITY RATING SCALE - C-SSRS
2. HAVE YOU ACTUALLY HAD ANY THOUGHTS OF KILLING YOURSELF IN THE PAST MONTH?: NO
6. HAVE YOU EVER DONE ANYTHING, STARTED TO DO ANYTHING, OR PREPARED TO DO ANYTHING TO END YOUR LIFE?: NO
1. IN THE PAST MONTH, HAVE YOU WISHED YOU WERE DEAD OR WISHED YOU COULD GO TO SLEEP AND NOT WAKE UP?: NO

## 2025-05-21 ASSESSMENT — ACTIVITIES OF DAILY LIVING (ADL)
ADLS_ACUITY_SCORE: 52

## 2025-05-21 NOTE — TELEPHONE ENCOUNTER
[facility] Geriatrics Triage Nurse Telephone Encounter    Provider: KEVIN Davidson CNP  Facility: [facility] Facility Type:  [facility]    Caller: Amanda  Call Back Number: [phone]    Allergies:    Allergies   Allergen Reactions    Lanolin      Other Reaction(s): Not available    Cranberry Extract Itching and Rash    Doxycycline Rash    Latex Rash    Penicillin V Rash     Reaction occurred as a child. Patient tolerated Zosyn 6/2018, Cefazolin 10/2018, and has also tolerated Augmentin.    Penicillins Rash     Reaction occurred as a child. Patient tolerated Zosyn 6/2018, Cefazolin 10/2018, and has also tolerated Augmentin.        Reason for call: Nurse is calling to report that patient is being treated for leg wounds to his right lower leg, however now she notices red streaks going up his leg and into the the thigh.  Patient does endorse pain in his right thigh.  VS are stable.  The right lower leg wounds are being cleansed with Vashe wound cleanser, calcium alginate dressing, covered with ABD pads and skin prep periwounds.  Patient did refuse for his dressings to be changed today by the nurse.  Writer instructed the nurse to remove the dressing to assess the wounds and see the entirety of the right leg.  The nurse called back to report that the whole right lower leg is very red and hot to touch.  There is yellow purulent drainage coming from the wounds and after further assessment, notes that the red streaks are up into the upper right thigh.       Verbal Order/Direction given by Provider: Send patient to the ER due to RLE infection and RLE wound infection.      Provider giving Order:  KEVIN Davidson CNP    Verbal Order given to: Amanda Jarquin RN

## 2025-05-22 ENCOUNTER — APPOINTMENT (OUTPATIENT)
Dept: ULTRASOUND IMAGING | Facility: HOSPITAL | Age: 80
End: 2025-05-22
Attending: HOSPITALIST
Payer: MEDICARE

## 2025-05-22 VITALS
TEMPERATURE: 97.4 F | SYSTOLIC BLOOD PRESSURE: 113 MMHG | OXYGEN SATURATION: 97 % | HEIGHT: 72 IN | BODY MASS INDEX: 24.38 KG/M2 | RESPIRATION RATE: 18 BRPM | HEART RATE: 69 BPM | DIASTOLIC BLOOD PRESSURE: 55 MMHG | WEIGHT: 180 LBS

## 2025-05-22 PROBLEM — J44.9 CHRONIC OBSTRUCTIVE PULMONARY DISEASE, UNSPECIFIED (H): Chronic | Status: ACTIVE | Noted: 2022-05-17

## 2025-05-22 PROBLEM — N18.32 STAGE 3B CHRONIC KIDNEY DISEASE (H): Chronic | Status: ACTIVE | Noted: 2020-11-12

## 2025-05-22 PROBLEM — I50.30 HEART FAILURE WITH PRESERVED EJECTION FRACTION, NYHA CLASS II (H): Chronic | Status: ACTIVE | Noted: 2018-01-25

## 2025-05-22 PROBLEM — I10 ESSENTIAL HYPERTENSION: Chronic | Status: ACTIVE | Noted: 2022-05-12

## 2025-05-22 PROBLEM — I48.19 PERSISTENT ATRIAL FIBRILLATION (H): Chronic | Status: ACTIVE | Noted: 2022-05-12

## 2025-05-22 PROBLEM — E11.59 TYPE 2 DIABETES MELLITUS WITH OTHER CIRCULATORY COMPLICATION, WITH LONG-TERM CURRENT USE OF INSULIN (H): Chronic | Status: ACTIVE | Noted: 2022-05-25

## 2025-05-22 PROBLEM — Z79.4 TYPE 2 DIABETES MELLITUS WITH OTHER CIRCULATORY COMPLICATION, WITH LONG-TERM CURRENT USE OF INSULIN (H): Chronic | Status: ACTIVE | Noted: 2022-05-25

## 2025-05-22 LAB
ANION GAP SERPL CALCULATED.3IONS-SCNC: 11 MMOL/L (ref 7–15)
BASOPHILS # BLD AUTO: 0.1 10E3/UL (ref 0–0.2)
BASOPHILS NFR BLD AUTO: 1 %
BUN SERPL-MCNC: 39.1 MG/DL (ref 8–23)
CALCIUM SERPL-MCNC: 9.2 MG/DL (ref 8.8–10.4)
CHLORIDE SERPL-SCNC: 99 MMOL/L (ref 98–107)
CREAT SERPL-MCNC: 1.67 MG/DL (ref 0.67–1.17)
EGFRCR SERPLBLD CKD-EPI 2021: 41 ML/MIN/1.73M2
EOSINOPHIL # BLD AUTO: 0.3 10E3/UL (ref 0–0.7)
EOSINOPHIL NFR BLD AUTO: 3 %
ERYTHROCYTE [DISTWIDTH] IN BLOOD BY AUTOMATED COUNT: 13.7 % (ref 10–15)
EST. AVERAGE GLUCOSE BLD GHB EST-MCNC: 143 MG/DL
GLUCOSE BLDC GLUCOMTR-MCNC: 103 MG/DL (ref 70–99)
GLUCOSE BLDC GLUCOMTR-MCNC: 110 MG/DL (ref 70–99)
GLUCOSE BLDC GLUCOMTR-MCNC: 113 MG/DL (ref 70–99)
GLUCOSE BLDC GLUCOMTR-MCNC: 136 MG/DL (ref 70–99)
GLUCOSE BLDC GLUCOMTR-MCNC: 162 MG/DL (ref 70–99)
GLUCOSE SERPL-MCNC: 108 MG/DL (ref 70–99)
HBA1C MFR BLD: 6.6 %
HCO3 SERPL-SCNC: 26 MMOL/L (ref 22–29)
HCT VFR BLD AUTO: 34.3 % (ref 40–53)
HGB BLD-MCNC: 11.1 G/DL (ref 13.3–17.7)
IMM GRANULOCYTES # BLD: 0.1 10E3/UL
IMM GRANULOCYTES NFR BLD: 1 %
LYMPHOCYTES # BLD AUTO: 1.2 10E3/UL (ref 0.8–5.3)
LYMPHOCYTES NFR BLD AUTO: 13 %
MAGNESIUM SERPL-MCNC: 1.7 MG/DL (ref 1.7–2.3)
MCH RBC QN AUTO: 26.9 PG (ref 26.5–33)
MCHC RBC AUTO-ENTMCNC: 32.4 G/DL (ref 31.5–36.5)
MCV RBC AUTO: 83 FL (ref 78–100)
MONOCYTES # BLD AUTO: 1.3 10E3/UL (ref 0–1.3)
MONOCYTES NFR BLD AUTO: 15 %
MRSA DNA SPEC QL NAA+PROBE: POSITIVE
NEUTROPHILS # BLD AUTO: 6.4 10E3/UL (ref 1.6–8.3)
NEUTROPHILS NFR BLD AUTO: 69 %
NRBC # BLD AUTO: 0 10E3/UL
NRBC BLD AUTO-RTO: 0 /100
PHOSPHATE SERPL-MCNC: 3.5 MG/DL (ref 2.5–4.5)
PLATELET # BLD AUTO: 209 10E3/UL (ref 150–450)
POTASSIUM SERPL-SCNC: 4.4 MMOL/L (ref 3.4–5.3)
RBC # BLD AUTO: 4.13 10E6/UL (ref 4.4–5.9)
SA TARGET DNA: POSITIVE
SODIUM SERPL-SCNC: 136 MMOL/L (ref 135–145)
WBC # BLD AUTO: 9.3 10E3/UL (ref 4–11)

## 2025-05-22 PROCEDURE — 87186 SC STD MICRODIL/AGAR DIL: CPT | Performed by: HOSPITALIST

## 2025-05-22 PROCEDURE — 250N000011 HC RX IP 250 OP 636: Performed by: HOSPITALIST

## 2025-05-22 PROCEDURE — 84100 ASSAY OF PHOSPHORUS: CPT | Performed by: HOSPITALIST

## 2025-05-22 PROCEDURE — 87640 STAPH A DNA AMP PROBE: CPT | Performed by: HOSPITALIST

## 2025-05-22 PROCEDURE — 36415 COLL VENOUS BLD VENIPUNCTURE: CPT | Performed by: HOSPITALIST

## 2025-05-22 PROCEDURE — 250N000013 HC RX MED GY IP 250 OP 250 PS 637: Performed by: HOSPITALIST

## 2025-05-22 PROCEDURE — 83735 ASSAY OF MAGNESIUM: CPT | Performed by: HOSPITALIST

## 2025-05-22 PROCEDURE — 99223 1ST HOSP IP/OBS HIGH 75: CPT | Mod: AI | Performed by: HOSPITALIST

## 2025-05-22 PROCEDURE — 99207 PR NO BILLABLE SERVICE THIS VISIT: CPT | Performed by: INTERNAL MEDICINE

## 2025-05-22 PROCEDURE — G0545 PR INHRENT VISIT TO INPT/OBS W CNFRM/SUSPCT INFCT DIS BY INFCT DIS SPCIALST: HCPCS | Performed by: INTERNAL MEDICINE

## 2025-05-22 PROCEDURE — 85004 AUTOMATED DIFF WBC COUNT: CPT | Performed by: HOSPITALIST

## 2025-05-22 PROCEDURE — 93922 UPR/L XTREMITY ART 2 LEVELS: CPT

## 2025-05-22 PROCEDURE — G0463 HOSPITAL OUTPT CLINIC VISIT: HCPCS

## 2025-05-22 PROCEDURE — 93925 LOWER EXTREMITY STUDY: CPT

## 2025-05-22 PROCEDURE — 82962 GLUCOSE BLOOD TEST: CPT

## 2025-05-22 PROCEDURE — 82310 ASSAY OF CALCIUM: CPT | Performed by: HOSPITALIST

## 2025-05-22 PROCEDURE — 120N000001 HC R&B MED SURG/OB

## 2025-05-22 PROCEDURE — 99222 1ST HOSP IP/OBS MODERATE 55: CPT | Performed by: INTERNAL MEDICINE

## 2025-05-22 RX ORDER — CLOPIDOGREL BISULFATE 75 MG/1
75 TABLET ORAL DAILY
Status: DISCONTINUED | OUTPATIENT
Start: 2025-05-22 | End: 2025-05-26 | Stop reason: HOSPADM

## 2025-05-22 RX ORDER — CALCIUM CARBONATE 500 MG/1
1000 TABLET, CHEWABLE ORAL 4 TIMES DAILY PRN
Status: DISCONTINUED | OUTPATIENT
Start: 2025-05-22 | End: 2025-05-26 | Stop reason: HOSPADM

## 2025-05-22 RX ORDER — SIMVASTATIN 40 MG
40 TABLET ORAL AT BEDTIME
Status: DISCONTINUED | OUTPATIENT
Start: 2025-05-22 | End: 2025-05-26 | Stop reason: HOSPADM

## 2025-05-22 RX ORDER — ONDANSETRON 2 MG/ML
4 INJECTION INTRAMUSCULAR; INTRAVENOUS EVERY 6 HOURS PRN
Status: DISCONTINUED | OUTPATIENT
Start: 2025-05-22 | End: 2025-05-26 | Stop reason: HOSPADM

## 2025-05-22 RX ORDER — CEFEPIME HYDROCHLORIDE 2 G/1
2 INJECTION, POWDER, FOR SOLUTION INTRAVENOUS EVERY 24 HOURS
Status: DISCONTINUED | OUTPATIENT
Start: 2025-05-22 | End: 2025-05-23

## 2025-05-22 RX ORDER — LIDOCAINE 40 MG/G
CREAM TOPICAL
Status: DISCONTINUED | OUTPATIENT
Start: 2025-05-22 | End: 2025-05-26 | Stop reason: HOSPADM

## 2025-05-22 RX ORDER — NICOTINE POLACRILEX 4 MG
15-30 LOZENGE BUCCAL
Status: DISCONTINUED | OUTPATIENT
Start: 2025-05-22 | End: 2025-05-26 | Stop reason: HOSPADM

## 2025-05-22 RX ORDER — FUROSEMIDE 20 MG/1
40 TABLET ORAL DAILY
Status: DISCONTINUED | OUTPATIENT
Start: 2025-05-22 | End: 2025-05-26 | Stop reason: HOSPADM

## 2025-05-22 RX ORDER — GABAPENTIN 100 MG/1
200 CAPSULE ORAL 2 TIMES DAILY
Status: DISCONTINUED | OUTPATIENT
Start: 2025-05-22 | End: 2025-05-26 | Stop reason: HOSPADM

## 2025-05-22 RX ORDER — PROCHLORPERAZINE MALEATE 5 MG/1
5 TABLET ORAL EVERY 6 HOURS PRN
Status: DISCONTINUED | OUTPATIENT
Start: 2025-05-22 | End: 2025-05-26 | Stop reason: HOSPADM

## 2025-05-22 RX ORDER — ALBUTEROL SULFATE 90 UG/1
2 INHALANT RESPIRATORY (INHALATION) 2 TIMES DAILY PRN
Status: DISCONTINUED | OUTPATIENT
Start: 2025-05-22 | End: 2025-05-26 | Stop reason: HOSPADM

## 2025-05-22 RX ORDER — PANTOPRAZOLE SODIUM 40 MG/1
40 TABLET, DELAYED RELEASE ORAL DAILY
Status: DISCONTINUED | OUTPATIENT
Start: 2025-05-22 | End: 2025-05-26 | Stop reason: HOSPADM

## 2025-05-22 RX ORDER — GUAIFENESIN 200 MG/10ML
200 LIQUID ORAL EVERY 4 HOURS PRN
Status: DISCONTINUED | OUTPATIENT
Start: 2025-05-22 | End: 2025-05-26 | Stop reason: HOSPADM

## 2025-05-22 RX ORDER — DEXTROSE MONOHYDRATE 25 G/50ML
25-50 INJECTION, SOLUTION INTRAVENOUS
Status: DISCONTINUED | OUTPATIENT
Start: 2025-05-22 | End: 2025-05-26 | Stop reason: HOSPADM

## 2025-05-22 RX ORDER — VANCOMYCIN HYDROCHLORIDE 1 G/200ML
1000 INJECTION, SOLUTION INTRAVENOUS EVERY 24 HOURS
Status: DISCONTINUED | OUTPATIENT
Start: 2025-05-22 | End: 2025-05-24

## 2025-05-22 RX ORDER — CARBOXYMETHYLCELLULOSE SODIUM 5 MG/ML
1 SOLUTION/ DROPS OPHTHALMIC 4 TIMES DAILY PRN
Status: DISCONTINUED | OUTPATIENT
Start: 2025-05-22 | End: 2025-05-26 | Stop reason: HOSPADM

## 2025-05-22 RX ORDER — ACETAMINOPHEN 650 MG/1
650 SUPPOSITORY RECTAL EVERY 4 HOURS PRN
Status: DISCONTINUED | OUTPATIENT
Start: 2025-05-22 | End: 2025-05-26 | Stop reason: HOSPADM

## 2025-05-22 RX ORDER — AMOXICILLIN 250 MG
2 CAPSULE ORAL 2 TIMES DAILY PRN
Status: DISCONTINUED | OUTPATIENT
Start: 2025-05-22 | End: 2025-05-26 | Stop reason: HOSPADM

## 2025-05-22 RX ORDER — CETIRIZINE HYDROCHLORIDE 5 MG/1
5 TABLET ORAL DAILY
Status: DISCONTINUED | OUTPATIENT
Start: 2025-05-22 | End: 2025-05-26 | Stop reason: HOSPADM

## 2025-05-22 RX ORDER — SPIRONOLACTONE 25 MG/1
25 TABLET ORAL DAILY
Status: DISCONTINUED | OUTPATIENT
Start: 2025-05-22 | End: 2025-05-26 | Stop reason: HOSPADM

## 2025-05-22 RX ORDER — AMOXICILLIN 250 MG
1 CAPSULE ORAL 2 TIMES DAILY PRN
Status: DISCONTINUED | OUTPATIENT
Start: 2025-05-22 | End: 2025-05-26 | Stop reason: HOSPADM

## 2025-05-22 RX ORDER — TAMSULOSIN HYDROCHLORIDE 0.4 MG/1
0.4 CAPSULE ORAL DAILY
Status: DISCONTINUED | OUTPATIENT
Start: 2025-05-22 | End: 2025-05-26 | Stop reason: HOSPADM

## 2025-05-22 RX ORDER — SALIVA STIMULANT COMB. NO.3
1 SPRAY, NON-AEROSOL (ML) MUCOUS MEMBRANE 4 TIMES DAILY PRN
Status: DISCONTINUED | OUTPATIENT
Start: 2025-05-22 | End: 2025-05-26 | Stop reason: HOSPADM

## 2025-05-22 RX ORDER — ACETAMINOPHEN 325 MG/1
650 TABLET ORAL EVERY 4 HOURS PRN
Status: DISCONTINUED | OUTPATIENT
Start: 2025-05-22 | End: 2025-05-26 | Stop reason: HOSPADM

## 2025-05-22 RX ORDER — ONDANSETRON 4 MG/1
4 TABLET, ORALLY DISINTEGRATING ORAL EVERY 6 HOURS PRN
Status: DISCONTINUED | OUTPATIENT
Start: 2025-05-22 | End: 2025-05-26 | Stop reason: HOSPADM

## 2025-05-22 RX ORDER — CEFEPIME HYDROCHLORIDE 2 G/1
2 INJECTION, POWDER, FOR SOLUTION INTRAVENOUS EVERY 12 HOURS
Status: DISCONTINUED | OUTPATIENT
Start: 2025-05-22 | End: 2025-05-22

## 2025-05-22 RX ADMIN — ACETAMINOPHEN 650 MG: 325 TABLET ORAL at 04:38

## 2025-05-22 RX ADMIN — SIMVASTATIN 40 MG: 40 TABLET, FILM COATED ORAL at 22:13

## 2025-05-22 RX ADMIN — ACETAMINOPHEN 650 MG: 325 TABLET ORAL at 12:13

## 2025-05-22 RX ADMIN — ACETAMINOPHEN 650 MG: 325 TABLET ORAL at 22:13

## 2025-05-22 RX ADMIN — CETIRIZINE HYDROCHLORIDE 5 MG: 5 TABLET ORAL at 08:13

## 2025-05-22 RX ADMIN — RIVAROXABAN 20 MG: 10 TABLET, FILM COATED ORAL at 17:37

## 2025-05-22 RX ADMIN — GABAPENTIN 200 MG: 100 CAPSULE ORAL at 08:13

## 2025-05-22 RX ADMIN — CEFEPIME HYDROCHLORIDE 2 G: 2 INJECTION, POWDER, FOR SOLUTION INTRAVENOUS at 20:41

## 2025-05-22 RX ADMIN — GABAPENTIN 200 MG: 100 CAPSULE ORAL at 20:41

## 2025-05-22 RX ADMIN — CLOPIDOGREL BISULFATE 75 MG: 75 TABLET, FILM COATED ORAL at 08:13

## 2025-05-22 RX ADMIN — PANTOPRAZOLE SODIUM 40 MG: 40 TABLET, DELAYED RELEASE ORAL at 08:13

## 2025-05-22 RX ADMIN — VANCOMYCIN HYDROCHLORIDE 1000 MG: 1 INJECTION, SOLUTION INTRAVENOUS at 22:13

## 2025-05-22 RX ADMIN — TAMSULOSIN HYDROCHLORIDE 0.4 MG: 0.4 CAPSULE ORAL at 08:13

## 2025-05-22 ASSESSMENT — ACTIVITIES OF DAILY LIVING (ADL)
ADLS_ACUITY_SCORE: 54
ADLS_ACUITY_SCORE: 54
ADLS_ACUITY_SCORE: 52
ADLS_ACUITY_SCORE: 52
ADLS_ACUITY_SCORE: 42
ADLS_ACUITY_SCORE: 42
ADLS_ACUITY_SCORE: 52
ADLS_ACUITY_SCORE: 52
ADLS_ACUITY_SCORE: 40
ADLS_ACUITY_SCORE: 40
ADLS_ACUITY_SCORE: 52
ADLS_ACUITY_SCORE: 42
ADLS_ACUITY_SCORE: 52
ADLS_ACUITY_SCORE: 52
ADLS_ACUITY_SCORE: 54
ADLS_ACUITY_SCORE: 54
ADLS_ACUITY_SCORE: 52
ADLS_ACUITY_SCORE: 52
ADLS_ACUITY_SCORE: 42
ADLS_ACUITY_SCORE: 42
ADLS_ACUITY_SCORE: 40
ADLS_ACUITY_SCORE: 52
ADLS_ACUITY_SCORE: 42

## 2025-05-22 NOTE — H&P
[facility]    History and Physical - Hospitalist Service       Date of Admission:  5/21/2025    Assessment & Plan      Ever Crane is a 80 year old male admitted on 5/21/2025. He was brought to the ED by ambulance from nursing facility for evaluation of right leg redness    #Right lower extremity cellulitis  #Bilateral lower extremity wounds, present on admission  - Right lower extremity x-rays without osseous erosive or destructive changes to suggest osteomyelitis, no soft tissue gas  - CRP inflammation 131.5, ESR 54  - Continue IV cefepime and vancomycin (history of MRSA colonization)  - Check MRSA PCR  - Check ABIs  - Wound care consult    #Acute kidney injury on chronic kidney disease stage IIIb  - Avoid nephrotoxins  - Hold PTA losartan, furosemide and spironolactone then reassess with labs    #Chronic HFpEF  - No signs or symptoms of acute CHF  - Monitor volume status, 2 L fluid restriction  - Holding diuretics as above, resume when appropriate    #Type 2 diabetes mellitus with polyneuropathy and retinopathy without long-term current use of insulin  - Hold PTA glimepiride to avoid hypoglycemia while on insulin  - Hold PTA semaglutide while inpatient  - Continue PTA gabapentin for neuropathy  - Medium insulin resistance scale  - Hypoglycemia monitoring    #Paroxysmal atrial fibrillation  - Not on rate controlling medications    #Drug-induced coagulation defect  #Drug-induced platelet defect  - On PTA rivaroxaban and clopidogrel  - Monitor for bleeding    #BPH  - Continue PTA tamsulosin    #Dementia without behavioral disturbance  - Supportive cares        Diet: Fluid restriction 2000 ML FLUID  Combination Diet Moderate Consistent Carb (60 g CHO per Meal) Diet; Low Saturated Fat Na <2400mg Diet    DVT Prophylaxis: DOAC  Dahl Catheter: Not present  Lines: None     Cardiac Monitoring: None  Code Status: Full Code          Disposition Plan     Medically Ready for Discharge: Anticipated in  2-4 Days           Lauro Gutierres MD  Hospitalist Service  [facility]  Securely message with [employer] (more info)  Text page via [employer] Paging/Directory     ______________________________________________________________________    Chief Complaint   Right leg redness streak    History is obtained from the patient, electronic health record, and emergency department physician    History of Present Illness   Ever Crane is a 80 year old male who was brought to the ED by ambulance from nursing facility for evaluation of red streaking on right leg.  Past medical history of peripheral vascular disease, chronic lower extremity wounds, venous stasis, lymphedema, type 2 diabetes, polyneuropathy, paroxysmal atrial fibrillation, chronic HFpEF, hypertension, CKD 3, COPD, hyperlipidemia, dementia, CAD, CABG x 3, BPH, left basal ganglia infarct.  Patient was noted with rapid spread of lymphangitic streak on the right leg.  In the ED, he was afebrile, hemodynamically stable and nontoxic.  Patient quit smoking many years ago.      Past Medical History    Past Medical History:   Diagnosis Date    A-fib (H)     Acute diastolic heart failure (H) 06/07/2022    Acute posthemorrhagic anemia 05/17/2022    MESHA (acute kidney injury)     Anemia     Atopic keratoconjunctivitis     Atrial fibrillation (H)     Brian Moe: 8/2011 Cardioversion; CHADS2 VASC = 5; he is on warfarin and sotalol     Atrial flutter (H)     Mcgarry's esophagus 01/01/2012    per note of Dr. Tavo Mike of [facility]    Bilateral lower leg cellulitis 08/31/2018    BPH (benign prostatic hyperplasia)     Candidiasis of perineum 01/03/2018    Carotid stenosis     Carotid stenosis, asymptomatic, right     Cellulitis     CHF (congestive heart failure) (H)     Cholecystitis 10/14/2019    Cholecystitis, acute 08/18/2019    Chronic systolic heart failure (H)     Chronic venous stasis dermatitis     Closed nondisplaced intertrochanteric fracture of left femur  with routine healing, subsequent encounter 05/18/2022    Clostridium difficile colitis     Contact with and (suspected) exposure to covid-19 12/13/2021    COPD (chronic obstructive pulmonary disease) (H)     Coronary artery disease due to lipid rich plaque 2000    CABG x2    Coronary atherosclerosis     Diabetes (H)     Diabetic ulcer of both feet (H) 10/31/2017    Diabetic ulcer of toe of right foot associated with diabetes mellitus due to underlying condition, limited to breakdown of skin (H) 01/05/2023    Diabetic ulcer of toe of right foot associated with diabetes mellitus due to underlying condition, with necrosis of bone (H) 01/05/2023    Dyslexia     Dyslipidemia, goal LDL below 70 2000    Epistaxis     Essential hypertension     Gangrene of left foot (H)     GERD (gastroesophageal reflux disease)     HLD (hyperlipidemia)     HTN (hypertension)     Hyponatremia     Ischemic heart disease     Lymphedema     Mild cognitive impairment     MRSA (methicillin resistant Staphylococcus aureus)     Neuropathy     Non-STEMI (non-ST elevated myocardial infarction) (H)     Occlusion and stenosis of right carotid artery     Osteoarthrosis     Osteomyelitis of ankle and foot (H)     Other atopic dermatitis     PAD (peripheral artery disease)     Peripheral vascular disease     Pneumonia 06/26/2018    Polyneuropathy due to type 2 diabetes mellitus (H)     Pressure ulcer, heel     Bilateral    Renal insufficiency     Type 2 diabetes mellitus, without long-term current use of insulin (H)     Ulcer of right foot (H)     Unable to function independently 11/13/2017       Past Surgical History   Past Surgical History:   Procedure Laterality Date    AMPUTATE FOOT Left 11/05/2017    Procedure: LEFT TRANSMETATARSAL AMPUTATION;  Surgeon: Ever Wick MD;  Location: [facility];  Service:     AMPUTATE FOOT Right 4/27/2023    Procedure: Transmetatarsal amputation right foot;  Surgeon: Miguelangel Spears DPM;  Location:   [facility] [place] OR    AMPUTATE FOOT Right 5/15/2023    Procedure: partial calcanectomy;  Surgeon: Miguelangel Spears DPM;  Location: [facility] [place] OR    BYPASS GRAFT ARTERY CORONARY N/A 03/06/2000    SVG to OM1, SVG to PDA    BYPASS GRAFT ARTERY CORONARY N/A 10/04/2018    redo CABG due to graft failure    CABG MEASURES GRP      CARDIOVERSION  08/25/2011    CV CORONARY ANGIOGRAM N/A 10/01/2018    Procedure: Coronary Angiogram;  Surgeon: Miki Mac MD;  Location: [facility];  Service:     INCISION AND DRAINAGE FOOT, COMBINED Right 5/15/2023    Procedure: INCISION AND DRAINAGE, right heel with,;  Surgeon: Miguelangel Spears DPM;  Location: [facility] [place]    INCISION AND DRAINAGE FOOT, COMBINED Bilateral 6/1/2023    Procedure: INCISION AND DRAINAGE, right foot with debridement of ulceration bilateral heels;  Surgeon: Miguelangel Spears DPM;  Location: [facility] [place]    INGUINAL HERNIA REPAIR Bilateral 1969    and 1979    IR EXTREMITY ANGIOGRAM BILATERAL  11/3/2017    IR LOWER EXTREMITY ANGIOGRAM LEFT  3/10/2023    IR LOWER EXTREMITY ANGIOGRAM RIGHT  1/24/2023    LAPAROSCOPIC CHOLECYSTECTOMY N/A 08/18/2019    Procedure: CHOLECYSTECTOMY, LAPAROSCOPIC;  Surgeon: Stewart Fountain MD;  Location: [facility];  Service: General    LENGTHEN TENDON ACHILLES Right 4/27/2023    Procedure: with Achilles tendon lengthening, debridement of right heel ulceration.;  Surgeon: Miguelangel Spears DPM;  Location: [facility] [place]       Prior to Admission Medications   Prior to Admission Medications   Prescriptions Last Dose Informant Patient Reported? Taking?   Semaglutide, 1 MG/DOSE, (OZEMPIC) 4 MG/3ML pen Unknown  Yes Yes   Sig: Inject 1 mg subcutaneously every 7 days.   acetaminophen (TYLENOL) 325 MG tablet   Yes Yes   Sig: Take 650 mg by mouth 2 times daily as needed for mild pain   albuterol (PROAIR HFA/PROVENTIL HFA/VENTOLIN HFA) 108 (90 Base) MCG/ACT inhaler   Yes Yes   Sig: Inhale 2 puffs  into the lungs 2 times daily as needed   cetirizine (ZYRTEC) 5 MG tablet Unknown  Yes Yes   Sig: Take 5 mg by mouth daily   clopidogrel (PLAVIX) 75 MG tablet Unknown  Yes Yes   Sig: Take 75 mg by mouth daily   clotrimazole-betamethasone (LOTRISONE) 1-0.05 % external cream Unknown  No Yes   Sig: Apply topically 2 times daily   furosemide (LASIX) 40 MG tablet Unknown  Yes Yes   Sig: Take 40 mg by mouth daily   gabapentin (NEURONTIN) 100 MG capsule Unknown  Yes Yes   Sig: Take 200 mg by mouth 2 times daily   glimepiride (AMARYL) 1 MG tablet Unknown  Yes Yes   Sig: Take 1 mg by mouth every morning (before breakfast)   hypromellose (ARTIFICIAL TEARS) 0.5 % SOLN ophthalmic solution   Yes Yes   Sig: Place 1 drop Into the left eye 4 times daily as needed for dry eyes   ketoconazole (NIZORAL) 2 % external shampoo Unknown  Yes Yes   Sig: Apply topically twice a week Mon and Fri.   losartan (COZAAR) 25 MG tablet Unknown  Yes Yes   Sig: Take 12.5 mg by mouth daily   naloxone (NARCAN) 0.4 MG/ML injection   Yes No   Sig: Inject 0.1-0.4 mg into the muscle once as needed for opioid reversal   omeprazole (PRILOSEC) 20 MG CR capsule Unknown  Yes Yes   Sig: Take 20 mg by mouth daily   rivaroxaban ANTICOAGULANT (XARELTO) 15 MG TABS tablet Unknown  Yes Yes   Sig: Take 20 mg by mouth daily   simvastatin (ZOCOR) 40 MG tablet Unknown  Yes Yes   Sig: Take 40 mg by mouth At Bedtime   spironolactone (ALDACTONE) 25 MG tablet Unknown  Yes Yes   Sig: Take 25 mg by mouth daily   tamsulosin (FLOMAX) 0.4 MG capsule Unknown  Yes Yes   Sig: Take 0.4 mg by mouth daily      Facility-Administered Medications: None           Physical Exam   Vital Signs: Temp: 98.2  F (36.8  C) Temp src: Oral BP: 108/51 Pulse: 96   Resp: 18 SpO2: 97 % O2 Device: None (Room air)    Weight: 180 lbs 0 oz    Constitutional: no apparent distress  Respiratory: no increased work of breathing and clear to auscultation  Cardiovascular: regular rate and rhythm  GI: normal bowel  sounds  Skin: Bilateral lower extremities dry skin with excoriations, shallow wounds throughout, right leg erythema and warmth  Musculoskeletal: Bilateral transmetatarsal amputations, chronic nonpitting edema      Medical Decision Making       55 MINUTES SPENT BY ME on the date of service doing chart review, history, exam, documentation & further activities per the note.  MANAGEMENT DISCUSSED with the following over the past 24 hours: ED provider and patient       Data     I have personally reviewed the following data over the past 24 hrs:    9.4  \   12.8 (L)   / 221     136 97 (L) 40.9 (H) /  131 (H)   4.3 25 1.87 (H) \     Procal: N/A CRP: 131.50 (H) Lactic Acid: N/A         Imaging results reviewed over the past 24 hrs:   Recent Results (from the past 24 hours)   XR Femur Right 2 Views    Narrative    EXAM: XR FEMUR RIGHT 2 VIEWS, XR TIBIA AND FIBULA RIGHT 2 VIEWS  LOCATION: [facility]  DATE: 5/21/2025    INDICATION: R thigh pain, infection  COMPARISON: None.      Impression    IMPRESSION: No osseous erosive or destructive change of the right femur, tibia, or fibula to suggest osteomyelitis radiographically. Diffuse osseous demineralization. Mild degenerative arthritis right hip and knee. Arterial calcifications. Surgical clips   along the medial aspect of the right lower extremity. No soft tissue gas. No knee joint effusion.   XR Tibia and Fibula Right 2 Views    Narrative    EXAM: XR FEMUR RIGHT 2 VIEWS, XR TIBIA AND FIBULA RIGHT 2 VIEWS  LOCATION: [facility]  DATE: 5/21/2025    INDICATION: R thigh pain, infection  COMPARISON: None.      Impression    IMPRESSION: No osseous erosive or destructive change of the right femur, tibia, or fibula to suggest osteomyelitis radiographically. Diffuse osseous demineralization. Mild degenerative arthritis right hip and knee. Arterial calcifications. Surgical clips   along the medial aspect of the right lower extremity. No  soft tissue gas. No knee joint effusion.

## 2025-05-22 NOTE — PLAN OF CARE
"  Problem: Adult Inpatient Plan of Care  Goal: Plan of Care Review  Description: The Plan of Care Review/Shift note should be completed every shift.  The Outcome Evaluation is a brief statement about your assessment that the patient is improving, declining, or no change.  This information will be displayed automatically on your shift  note.  Outcome: Progressing  Goal: Patient-Specific Goal (Individualized)  Description: You can add care plan individualizations to a care plan. Examples of Individualization might be:  \"Parent requests to be called daily at 9am for status\", \"I have a hard time hearing out of my right ear\", or \"Do not touch me to wake me up as it startles  me\".  Outcome: Progressing  Goal: Absence of Hospital-Acquired Illness or Injury  Outcome: Progressing  Intervention: Prevent Skin Injury  Recent Flowsheet Documentation  Taken 5/22/2025 0800 by Crystal Barnard RN  Body Position: position changed independently  Intervention: Prevent and Manage VTE (Venous Thromboembolism) Risk  Recent Flowsheet Documentation  Taken 5/22/2025 0800 by Crystal Barnard RN  VTE Prevention/Management: SCDs off (sequential compression devices)  Intervention: Prevent Infection  Recent Flowsheet Documentation  Taken 5/22/2025 0800 by Crystal Barnard RN  Infection Prevention: hand hygiene promoted  Goal: Optimal Comfort and Wellbeing  Outcome: Progressing  Intervention: Provide Person-Centered Care  Recent Flowsheet Documentation  Taken 5/22/2025 0800 by Crystal Barnard RN  Trust Relationship/Rapport: care explained  Goal: Readiness for Transition of Care  Outcome: Progressing     Problem: Delirium  Goal: Optimal Coping  Outcome: Progressing  Goal: Improved Behavioral Control  Outcome: Progressing  Intervention: Minimize Safety Risk  Recent Flowsheet Documentation  Taken 5/22/2025 0800 by Crystal Barnard RN  Trust Relationship/Rapport: care explained  Goal: Improved Attention and Thought Clarity  Outcome: " Progressing  Intervention: Maximize Cognitive Function  Recent Flowsheet Documentation  Taken 5/22/2025 0800 by Crystal Barnard, RN  Sensory Stimulation Regulation: quiet environment promoted  Reorientation Measures: calendar in view  Goal: Improved Sleep  Outcome: Progressing   Goal Outcome Evaluation:                      Pt is alert and oriented x3. Pt is forgetful. Pt denies pain. Pt's v/s is stable. Pt is up with assist of 1. Pt is med complaint. Continue to monitor pt.

## 2025-05-22 NOTE — PROGRESS NOTES
Brief progress note:    Patient was evaluated by my colleague earlier today.  Admitted from nursing facility for right lower extremity cellulitis on top of chronic wounds.  Also with MESHA on CKD stage III.  Wound care to see.  ID consulted.  MRSA swab positive.  Cefepime and vancomycin.  Trend creatinine  Appreciate ID input    Leonela Philippe DO

## 2025-05-22 NOTE — ED PROVIDER NOTES
EMERGENCY DEPARTMENT ENCOUNTER      NAME: Ever Crane  AGE: 80 year old male  YOB: 1945  MRN: [id number]  EVALUATION DATE & TIME: 2025  8:25 PM    PCP: Sariah Harper    ED PROVIDER: Anderson Estrada M.D.      Chief Complaint   Patient presents with    Leg Pain    Skin Ulcer         FINAL IMPRESSION:  1. Cellulitis of right lower extremity          ED COURSE & MEDICAL DECISION MAKIN year old male presents to the Emergency Department for evaluation of patient arrives to the emergency department  with redness and cellulitic change with lymphangitis extending from his right shin and knee where he has extensive chronic appearing lower extremity skin ulcers.  He arrives to the emergency department afebrile and not systemically ill appearing or septic.  Labs reveal elevated ESR and CRP but normal white blood cell count.  Plain films negative for subcutaneous gas, underlying fracture, or erosive change.  He has a mild acute kidney injury.  He was given some fluids and started on vancomycin and Zosyn for treatment of right lower extremity cellulitis given comorbidities including diabetes as well as rapid spread of the leg including lymphangitis will require hospitalization.  Discussed case with hospitalist Dr. Gutierres.    At the conclusion of the encounter I discussed the results of all of the tests and the disposition. The questions were answered. The patient or family acknowledged understanding and was agreeable with the care plan.       Medical Decision Making    Admit.    MIPS (CTPE, Dental pain, Dahl, Sinusitis, Asthma/COPD, Head Trauma): Not Applicable    SEPSIS: None            MEDICATIONS GIVEN IN THE EMERGENCY:  Medications   vancomycin (VANCOCIN) 1,500 mg in 0.9% NaCl 265 mL intermittent infusion (1,500 mg Intravenous $New Bag 250)   sodium chloride 0.9% BOLUS 500 mL (0 mLs Intravenous Stopped 25)   ceFEPIme (MAXIPIME) 2 g vial to attach to  mL bag for ADULTS  "or NS 50 mL bag for PEDS (0 g Intravenous Stopped 5/21/25 8137)       NEW PRESCRIPTIONS STARTED AT TODAY'S ER VISIT  New Prescriptions    No medications on file          =================================================================    HPI    Patient information was obtained from: The patient    Use of : N/A       Ever Crane is a 80 year old male with a pertinent history of HTN, CKD, heart failure, PVD, atrial fibrillation, atrial flutter, T2DM, COPD, CAD, and HLD who presents to this ED for evaluation of leg pain and skin ulcer.    The patient reports his nursing home staff noticed both of the patient's legs were becoming more swollen. The patient also notes some redness and red streaking up his right leg. The patient endorses he has been undergoing \"shock treatments\" on his legs recently. He denies having any fevers.    REVIEW OF SYSTEMS   All systems reviewed and negative except as noted in HPI.    PAST MEDICAL HISTORY:  Past Medical History:   Diagnosis Date    A-fib (H)     Acute diastolic heart failure (H) 06/07/2022    Acute posthemorrhagic anemia 05/17/2022    MESHA (acute kidney injury)     Anemia     Atopic keratoconjunctivitis     Atrial fibrillation (H)     Brian Moe: 8/2011 Cardioversion; CHADS2 VASC = 5; he is on warfarin and sotalol     Atrial flutter (H)     Mcgarry's esophagus 01/01/2012    per note of Dr. Tavo Mike of [facility]    Bilateral lower leg cellulitis 08/31/2018    BPH (benign prostatic hyperplasia)     Candidiasis of perineum 01/03/2018    Carotid stenosis     Carotid stenosis, asymptomatic, right     Cellulitis     CHF (congestive heart failure) (H)     Cholecystitis 10/14/2019    Cholecystitis, acute 08/18/2019    Chronic systolic heart failure (H)     Chronic venous stasis dermatitis     Closed nondisplaced intertrochanteric fracture of left femur with routine healing, subsequent encounter 05/18/2022    Clostridium difficile colitis     Contact with and (suspected) " exposure to covid-19 12/13/2021    COPD (chronic obstructive pulmonary disease) (H)     Coronary artery disease due to lipid rich plaque 2000    CABG x2    Coronary atherosclerosis     Diabetes (H)     Diabetic ulcer of both feet (H) 10/31/2017    Diabetic ulcer of toe of right foot associated with diabetes mellitus due to underlying condition, limited to breakdown of skin (H) 01/05/2023    Diabetic ulcer of toe of right foot associated with diabetes mellitus due to underlying condition, with necrosis of bone (H) 01/05/2023    Dyslexia     Dyslipidemia, goal LDL below 70 2000    Epistaxis     Essential hypertension     Gangrene of left foot (H)     GERD (gastroesophageal reflux disease)     HLD (hyperlipidemia)     HTN (hypertension)     Hyponatremia     Ischemic heart disease     Lymphedema     Mild cognitive impairment     MRSA (methicillin resistant Staphylococcus aureus)     Neuropathy     Non-STEMI (non-ST elevated myocardial infarction) (H)     Occlusion and stenosis of right carotid artery     Osteoarthrosis     Osteomyelitis of ankle and foot (H)     Other atopic dermatitis     PAD (peripheral artery disease)     Peripheral vascular disease     Pneumonia 06/26/2018    Polyneuropathy due to type 2 diabetes mellitus (H)     Pressure ulcer, heel     Bilateral    Renal insufficiency     Type 2 diabetes mellitus, without long-term current use of insulin (H)     Ulcer of right foot (H)     Unable to function independently 11/13/2017       PAST SURGICAL HISTORY:  Past Surgical History:   Procedure Laterality Date    AMPUTATE FOOT Left 11/05/2017    Procedure: LEFT TRANSMETATARSAL AMPUTATION;  Surgeon: Ever Wick MD;  [facility];  Service:     AMPUTATE FOOT Right 4/27/2023    Procedure: Transmetatarsal amputation right foot;  Surgeon: Miguelangel Spears DPM;  Location: [facility]    AMPUTATE FOOT Right 5/15/2023    Procedure: partial calcanectomy;  Surgeon: Miguelangel Spears DPM;   Location[facility]    BYPASS GRAFT ARTERY CORONARY N/A 03/06/2000    SVG to OM1, SVG to PDA    BYPASS GRAFT ARTERY CORONARY N/A 10/04/2018    redo CABG due to graft failure    CABG MEASURES GRP      CARDIOVERSION  08/25/2011    CV CORONARY ANGIOGRAM N/A 10/01/2018    Procedure: Coronary Angiogram;  Surgeon: Miki Mac MD;  Location: [facility];  Service:     INCISION AND DRAINAGE FOOT, COMBINED Right 5/15/2023    Procedure: INCISION AND DRAINAGE, right heel with,;  Surgeon: Miguelangel Spears DPM;  [place] [facility] [place]ISION AND DRAINAGE FOOT, COMBINED Bilateral 6/1/2023    Procedure: INCISION AND DRAINAGE, right foot with debridement of ulceration bilateral heels;  Surgeon: Miguelangel Spears DPM;  [place] [facility] [place]    INGUINAL HERNIA REPAIR Bilateral 1969    and 1979    IR EXTREMITY ANGIOGRAM BILATERAL  11/3/2017    IR LOWER EXTREMITY ANGIOGRAM LEFT  3/10/2023    IR LOWER EXTREMITY ANGIOGRAM RIGHT  1/24/2023    LAPAROSCOPIC CHOLECYSTECTOMY N/A 08/18/2019    Procedure: CHOLECYSTECTOMY, LAPAROSCOPIC;  Surgeon: Stewart Fountain MD;  Location: [facility];  Service: General    LENGTHEN TENDON ACHILLES Right 4/27/2023    Procedure: with Achilles tendon lengthening, debridement of right heel ulceration.;  Surgeon: Miguelangel Spears DPM;  [place] [facility] [place]           [place] MEDICATIONS:    Current Facility-Administered Medications   Medication Dose Route Frequency Provider Last Rate Last Admin    vancomycin (VANCOCIN) 1,500 mg in 0.9% NaCl 265 mL intermittent infusion  1,500 mg Intravenous Once Anderson Estrada MD   1,500 mg at 05/21/25 5877     Current Outpatient Medications   Medication Sig Dispense Refill    acetaminophen (TYLENOL) 325 MG tablet Take 650 mg by mouth 2 times daily as needed for mild pain      albuterol (PROAIR HFA/PROVENTIL HFA/VENTOLIN HFA) 108 (90 Base) MCG/ACT inhaler Inhale 2 puffs into the lungs 2 times daily as  needed      cetirizine (ZYRTEC) 5 MG tablet Take 5 mg by mouth daily      clopidogrel (PLAVIX) 75 MG tablet Take 75 mg by mouth daily      clotrimazole-betamethasone (LOTRISONE) 1-0.05 % external cream Apply topically 2 times daily      furosemide (LASIX) 40 MG tablet Take 40 mg by mouth daily      gabapentin (NEURONTIN) 100 MG capsule Take 200 mg by mouth 2 times daily      glimepiride (AMARYL) 1 MG tablet Take 1 mg by mouth every morning (before breakfast)      hypromellose (ARTIFICIAL TEARS) 0.5 % SOLN ophthalmic solution Place 1 drop Into the left eye 4 times daily as needed for dry eyes      ketoconazole (NIZORAL) 2 % external shampoo Apply topically twice a week Mon and Fri.      losartan (COZAAR) 25 MG tablet Take 12.5 mg by mouth daily      omeprazole (PRILOSEC) 20 MG CR capsule Take 20 mg by mouth daily      rivaroxaban ANTICOAGULANT (XARELTO) 15 MG TABS tablet Take 20 mg by mouth daily      Semaglutide, 1 MG/DOSE, (OZEMPIC) 4 MG/3ML pen Inject 1 mg subcutaneously every 7 days.      simvastatin (ZOCOR) 40 MG tablet Take 40 mg by mouth At Bedtime      spironolactone (ALDACTONE) 25 MG tablet Take 25 mg by mouth daily      tamsulosin (FLOMAX) 0.4 MG capsule Take 0.4 mg by mouth daily      naloxone (NARCAN) 0.4 MG/ML injection Inject 0.1-0.4 mg into the muscle once as needed for opioid reversal           ALLERGIES:  Allergies   Allergen Reactions    Lanolin      Other Reaction(s): Not available    Cranberry Extract Itching and Rash    Doxycycline Rash    Latex Rash    Penicillin V Rash     Reaction occurred as a child. Patient tolerated Zosyn 6/2018, Cefazolin 10/2018, and has also tolerated Augmentin.    Penicillins Rash     Reaction occurred as a child. Patient tolerated Zosyn 6/2018, Cefazolin 10/2018, and has also tolerated Augmentin.       FAMILY HISTORY:  Family History   Problem Relation Age of Onset    Sudden Death Mother 85.00    CABG Father     Valvular heart disease Father     Esophageal Cancer  "Father 58.00        cause of death    No Known Problems Son     No Known Problems Sister     Obesity Sister     Osteoarthritis Sister     No Known Problems Sister     No Known Problems Grandchild     No Known Problems Grandchild        SOCIAL HISTORY:   Social History     Socioeconomic History    Marital status:     Number of children: 1   Tobacco Use    Smoking status: Former     Current packs/day: 0.00     Average packs/day: 1 pack/day for 36.0 years (36.0 ttl pk-yrs)     Types: Cigarettes     Start date: 1964     Quit date: 2000     Years since quittin.0    Smokeless tobacco: Never   Substance and Sexual Activity    Alcohol use: Not Currently     Alcohol/week: 5.0 standard drinks of alcohol     Types: 1 Cans of beer, 4 Standard drinks or equivalent per week    Drug use: No    Sexual activity: Not Currently     Partners: Female   Social History Narrative    Ever lived with his son Raciel in 2017, but then he went to [facility] after foot amputation.  Ever states he will move to a facility in [place] in . 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  Our reception staff at [facility] in [place] confirmed on 2018 that Ever brought himself to the campus and did not utilize a family member or ride service.  I  would also note that he states his granddaughter is name Claire and not Leonela, so I corrected that in the chart (ROSALIA Moe MD). He lives in [facility] in  and is now not driving as he states \"I got a ticket\".        Social Drivers of Health      [facility]    Social Connections       VITALS:  /56   Pulse 58   Temp 98.2  F (36.8  C) (Oral)   Resp 18   Ht 1.829 m (6')   Wt 81.6 kg (180 lb)   SpO2 93%   BMI 24.41 kg/m      PHYSICAL EXAM  "   Constitutional: Elderly male patient, chronically ill-appearing, laying in bed  HENT: Normocephalic, Atraumatic. Neck Supple.  Eyes: EOMI, Conjunctiva normal.  Respiratory: Breathing comfortably on room air. Speaks full sentences easily. Lungs clear to ascultation.  Cardiovascular: Normal heart rate, Regular rhythm. No peripheral edema.  Abdomen: Soft, nontender  Musculoskeletal: Chronic venous stasis wounds to the bilateral lower extremities specially on the right shin.  There is erythema warmth and cellulitic change on the right knee with lymphangitis extending towards the right groin. See below  Integument: Warm, Dry.  Neurologic: Alert & awake, Normal motor function, Normal sensory function, No focal deficits noted.   Psychiatric: Cooperative. Affect appropriate.         LAB:  All pertinent labs reviewed and interpreted.  Labs Ordered and Resulted from Time of ED Arrival to Time of ED Departure   BASIC METABOLIC PANEL - Abnormal       Result Value    Sodium 136      Potassium 4.3      Chloride 97 (*)     Carbon Dioxide (CO2) 25      Anion Gap 14      Urea Nitrogen 40.9 (*)     Creatinine 1.87 (*)     GFR Estimate 36 (*)     Calcium 9.7      Glucose 131 (*)    ERYTHROCYTE SEDIMENTATION RATE AUTO - Abnormal    Erythrocyte Sedimentation Rate 54 (*)    CRP INFLAMMATION - Abnormal    CRP Inflammation 131.50 (*)    CBC WITH PLATELETS AND DIFFERENTIAL - Abnormal    WBC Count 9.4      RBC Count 4.62      Hemoglobin 12.8 (*)     Hematocrit 38.4 (*)     MCV 83      MCH 27.7      MCHC 33.3      RDW 13.8      Platelet Count 221      % Neutrophils 77      % Lymphocytes 10      % Monocytes 10      % Eosinophils 2      % Basophils 1      % Immature Granulocytes 0      NRBCs per 100 WBC 0      Absolute Neutrophils 7.3      Absolute Lymphocytes 1.0      Absolute Monocytes 0.9      Absolute Eosinophils 0.2      Absolute Basophils 0.1      Absolute Immature Granulocytes 0.0      Absolute NRBCs 0.0     BLOOD CULTURE   BLOOD  CULTURE       RADIOLOGY:  Reviewed all pertinent imaging. Please see official radiology report.  XR Tibia and Fibula Right 2 Views   Final Result   IMPRESSION: No osseous erosive or destructive change of the right femur, tibia, or fibula to suggest osteomyelitis radiographically. Diffuse osseous demineralization. Mild degenerative arthritis right hip and knee. Arterial calcifications. Surgical clips    along the medial aspect of the right lower extremity. No soft tissue gas. No knee joint effusion.      XR Femur Right 2 Views   Final Result   IMPRESSION: No osseous erosive or destructive change of the right femur, tibia, or fibula to suggest osteomyelitis radiographically. Diffuse osseous demineralization. Mild degenerative arthritis right hip and knee. Arterial calcifications. Surgical clips    along the medial aspect of the right lower extremity. No soft tissue gas. No knee joint effusion.              I, Geo Rob, am serving as a scribe to document services personally performed by Dr. Anderson Estrada, based on my observation and the provider's statements to me. I, Anderson Estrada MD attest that Geo Rob is acting in a scribe capacity, has observed my performance of the services and has documented them in accordance with my direction.    Anderson Estrada M.D.  Emergency Medicine  [facility] EMERGENCY DEPARTMENT  [facility] [facility] [id number] [phone]  Dept: [phone]       Anderson Estrada MD  05/21/25 7783

## 2025-05-22 NOTE — CONSULTS
Consultation - Infectious Disease  [facility]  Ever Crane,  1945, MRN [id number]    Admitting Dx: Cellulitis of right lower extremity [L03.115]    PCP: Sariah Harper, [phone]       ASSESSMENT   80-year-old man with a history of heart failure, A-fib, peripheral vascular disease, hypertension, diabetes, coronary artery disease, hyperlipidemia admitted with right leg lymphangitic cellulitis.    Right lower extremity cellulitis.  Chronic right lower extremity wounds.  Found to have red streaking into the right thigh without fevers or pain.  Admitted for treatment of cellulitis.  Chronic venous stasis dermatitis bilateral lower extremities.  Likely source of infection  History of penicillin allergy.  Documented that he has tolerated Zosyn and Augmentin before.    Active Problems:    Essential hypertension    Stage 3b chronic kidney disease (H)    Heart failure with preserved ejection fraction, NYHA class II (H)    Persistent atrial fibrillation (H)    Type 2 diabetes mellitus with other circulatory complication, with long-term current use of insulin (H)    Chronic obstructive pulmonary disease, unspecified (H)    Dementia, unspecified dementia severity, unspecified dementia type, unspecified whether behavioral, psychotic, or mood disturbance or anxiety (H)    Cellulitis of right lower extremity       PLAN   -Continue vancomycin and cefepime  -Follow-up on blood cultures  -Continue wound cares  -Monitor clinical response to antibiotics    Thank you for this consult. Will follow.    Remigio Guan MD  [facility]  Direct messaging: [email or web address] Paging  On-Call ID provider: [phone], option: [phone]      ===========================================      Chief Complaint   <principal problem not specified>       HPI     We have been requested by Leonela Philippe to evaluate Ever Crane for the above.    History obtained by patient    Ever Crane is a 80 year old man  with multiple medical comorbidities admitted from his skilled nursing facility for streaking erythema in his right thigh.  Patient has chronic venous stasis dermatitis.  He says that he has been receiving electrotherapy to his legs over the past few weeks.  He does not think that the electrodes are causing any irritation or itching to his legs.  One of his aides noticed red streaking in his right thigh and was recommended he come to the hospital.  The patient denies any itching or pain.  Denies fevers.  He has no acute complaints today.          Review of Systems   Ten systems reviewed and negative except for what is noted in the HPI       Medical History  Past Medical History:   Diagnosis Date    A-fib (H)     Acute diastolic heart failure (H) 06/07/2022    Acute posthemorrhagic anemia 05/17/2022    MESHA (acute kidney injury)     Anemia     Atopic keratoconjunctivitis     Atrial fibrillation (H)     Brian Moe: 8/2011 Cardioversion; CHADS2 VASC = 5; he is on warfarin and sotalol     Atrial flutter (H)     Mcgarry's esophagus 01/01/2012    per note of Dr. Tavo Mike of [facility]    Bilateral lower leg cellulitis 08/31/2018    BPH (benign prostatic hyperplasia)     Candidiasis of perineum 01/03/2018    Carotid stenosis     Carotid stenosis, asymptomatic, right     Cellulitis     CHF (congestive heart failure) (H)     Cholecystitis 10/14/2019    Cholecystitis, acute 08/18/2019    Chronic systolic heart failure (H)     Chronic venous stasis dermatitis     Closed nondisplaced intertrochanteric fracture of left femur with routine healing, subsequent encounter 05/18/2022    Clostridium difficile colitis     Contact with and (suspected) exposure to covid-19 12/13/2021    COPD (chronic obstructive pulmonary disease) (H)     Coronary artery disease due to lipid rich plaque 2000    CABG x2    Coronary atherosclerosis     Diabetes (H)     Diabetic ulcer of both feet (H) 10/31/2017    Diabetic ulcer of toe of right foot  associated with diabetes mellitus due to underlying condition, limited to breakdown of skin (H) 01/05/2023    Diabetic ulcer of toe of right foot associated with diabetes mellitus due to underlying condition, with necrosis of bone (H) 01/05/2023    Dyslexia     Dyslipidemia, goal LDL below 70 2000    Epistaxis     Essential hypertension     Gangrene of left foot (H)     GERD (gastroesophageal reflux disease)     HLD (hyperlipidemia)     HTN (hypertension)     Hyponatremia     Ischemic heart disease     Lymphedema     Mild cognitive impairment     MRSA (methicillin resistant Staphylococcus aureus)     Neuropathy     Non-STEMI (non-ST elevated myocardial infarction) (H)     Occlusion and stenosis of right carotid artery     Osteoarthrosis     Osteomyelitis of ankle and foot (H)     Other atopic dermatitis     PAD (peripheral artery disease)     Peripheral vascular disease     Pneumonia 06/26/2018    Polyneuropathy due to type 2 diabetes mellitus (H)     Pressure ulcer, heel     Bilateral    Renal insufficiency     Type 2 diabetes mellitus, without long-term current use of insulin (H)     Ulcer of right foot (H)     Unable to function independently 11/13/2017    Surgical History  He  has a past surgical history that includes cabg measures grp; IR Extremity Angiogram Bilateral (11/3/2017); Bypass graft artery coronary (N/A, 03/06/2000); Cardioversion (08/25/2011); Amputate foot (Left, 11/05/2017); Inguinal Hernia Repair (Bilateral, 1969); Cv Coronary Angiogram (N/A, 10/01/2018); Laparoscopic cholecystectomy (N/A, 08/18/2019); Bypass graft artery coronary (N/A, 10/04/2018); IR Lower Extremity Angiogram Right (1/24/2023); IR Lower Extremity Angiogram Left (3/10/2023); Amputate foot (Right, 4/27/2023); Lengthen tendon achilles (Right, 4/27/2023); Incision and drainage foot, combined (Right, 5/15/2023); Amputate foot (Right, 5/15/2023); and Incision and drainage foot, combined (Bilateral, 6/1/2023).     Social  "History  Reviewed, and he  reports that he quit smoking about 25 years ago. His smoking use included cigarettes. He started smoking about 60 years ago. He has a 36 pack-year smoking history. He has never used smokeless tobacco. He reports that he does not currently use alcohol after a past usage of about 5.0 standard drinks of alcohol per week. He reports that he does not use drugs.  Social History     Social History Narrative    Ever lived with his son Raciel in [place], 2017, but then he went to [facility] after foot amputation.  Ever states he will move to a facility in [place] in June, 2018. 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  Our reception staff at [facility] in [place] confirmed on June 13, 2018 that Ever brought himself to the campus and did not utilize a family member or ride service.  I  would also note that he states his granddaughter is name Claire and not Leonela, so I corrected that in the chart (ROSALIA Moe MD). He lives in [facility] in 2019 and is now not driving as he states \"I got a ticket\".        Family History  family history includes CABG in his father; Esophageal Cancer (age of onset: 58.00) in his father; No Known Problems in his grandchild, grandchild, sister, sister, and son; Obesity in his sister; Osteoarthritis in his sister; Sudden Death (age of onset: 85.00) in his mother; Valvular heart disease in his father.  family history reviewed and is not pertinent to the presenting problem.            Allergies     Allergies   Allergen Reactions    Lanolin      Other Reaction(s): Not available    Cranberry Extract Itching and Rash    Doxycycline Rash    Latex Rash    Penicillin V Rash     Reaction occurred as a child. Patient tolerated Zosyn 6/2018, Cefazolin 10/2018, and has also tolerated Augmentin.    Penicillins Rash     Reaction " occurred as a child. Patient tolerated Zosyn 6/2018, Cefazolin 10/2018, and has also tolerated Augmentin.           Antibiotics   Cefepime 5/21-  Vancomycin 5/21-    Previous:  None      Physical Exam     Temp:  [97.7  F (36.5  C)-98.7  F (37.1  C)] 98.7  F (37.1  C)  Pulse:  [] 89  Resp:  [18] 18  BP: (101-139)/(51-64) 120/62  SpO2:  [92 %-100 %] 97 %    /62 (BP Location: Left arm)   Pulse 89   Temp 98.7  F (37.1  C) (Oral)   Resp 18   Ht 1.829 m (6')   Wt 81.6 kg (180 lb)   SpO2 97%   BMI 24.41 kg/m      GENERAL:  well-developed, well-nourished, sitting in bed in no acute distress.   HENT:  Head is normocephalic, atraumatic. Oropharynx is moist without exudates or ulcers.  EYES:  Eyes have anicteric sclerae without conjunctival injection or stigmata of endocarditis.   NECK:  Supple.  LUNGS:  Clear to auscultation.  CARDIOVASCULAR:  Regular rate and rhythm with no murmurs, gallops or rubs.  ABDOMEN:  Normal bowel sounds, soft, nontender. No appreciable hepatosplenomegaly  EXT: Extremities warm and without edema.  Lower extremities are wrapped.  Multiple linear erythematous lines in the right thigh.  Mild warmth in the thigh and knee.  No inguinal lymphadenopathy.  See pictures below  SKIN:  No acute rashes. No stigmata of endocarditis.  NEUROLOGIC:  Grossly nonfocal.                  Cultures   5/21 blood cultures x 2: No growth to date    No results found for the last 90 days.       Laboratory results     Recent Labs   Lab 05/22/25  0653 05/21/25 2056   WBC 9.3 9.4   HGB 11.1* 12.8*    221       Recent Labs   Lab 05/22/25  0653 05/21/25 2056    136   CO2 26 25   BUN 39.1* 40.9*       Recent Labs   Lab 05/21/25 2056   CRPI 131.50*   SED 54*             Imaging   Radiology results reviewed    US Lower Extremity Arterial Duplex Bilateral  Result Date: 5/22/2025  EXAM: US LOWER EXTREMITY ARTERIAL DUPLEX BILATERAL LOCATION: [facility] DATE: 5/22/2025  INDICATION: PAD, wounds COMPARISON: 3/24/2003. TECHNIQUE: Duplex utilizing 2D gray-scale imaging, Doppler interrogation with color-flow and spectral waveform analysis. FINDINGS: RIGHT LOWER EXTREMITY ARTERIAL ASSESSMENT: External iliac artery 96/16 cm/s Common femoral artery: 76/9 cm/s Profunda femoris artery-prox: 103/10 cm/s SFA (proximal): 74/10 cm/s SFA (mid): 136/22 cm/s SFA (distal): 83/17 cm/s Popliteal artery - prox: 57/12 cm/s Popliteal artery - dist: 56/14 cm/s Posterior tibial artery - ankle: 18/6 cm/s Anterior tibial artery - ankle: 98/25 cm/s Dorsalis pedis artery - ankle: 71/21 cm/s Diffuse vascular calcification. Brisk biphasic flow is seen at the external iliac, common femoral, profunda femoral, and proximal superficial femoral arteries. Waveforms become monophasic in the mid-distal SFA continuing into the popliteal. There is mild  velocity elevation in the mid SFA. Findings favor diffuse disease. There is very dampened poststenotic monophasic flow distally in the posterior tibial consistent with significant proximal stenosis or occlusion. Brisker monophasic flow is present in the  anterior tibial and dorsalis pedis. Focused evaluation of a site of pain in the right foot amputation stump shows no sonographic abnormality. LEFT LOWER EXTREMITY ARTERIAL ASSESSMENT: External iliac artery 96/20 cm/s Common femoral artery: 74 cm/s Profunda femoris artery - prox: 41/7 cm/s SFA (proximal): 57 cm/s SFA (mid): 76 cm/s SFA (distal): 42 cm/s Popliteal artery - prox: 32/8 cm/s Popliteal artery - dist: 30/5 cm/s Posterior tibial artery - ankle: 55 cm/s Anterior tibial artery - ankle: 41/8 cm/s Dorsalis pedis artery - ankle: 34/6 cm/s Diffuse vascular calcification. There is brisk biphasic flow in the external iliac, common femoral, profunda femoral, and throughout the SFA and popliteal. Distal posterior tibial waveform is brisk and biphasic. Dorsalis pedis and distal anterior tibial are monophasic but brisk.      IMPRESSION: 1.  No sonographic evidence of inflow disease. 2.  Right lower extremity: Abnormal mid-distal SFA and popliteal waveforms with mild mid SFA velocity elevation, favoring diffuse femoral-popliteal disease. Severe disease noted in the posterior tibial artery. Brisker monophasic flow in the anterior tibial/dorsal pedal runoff. 3.  Left lower extremity: Brisk biphasic flow through the popliteal without evidence of significant femoral-popliteal stenosis. Posterior tibial runoff is also biphasic, while there is brisk monophasic flow in the dorsalis pedis and anterior tibial.    US EVELYNE Doppler No Exercise 1-2 Levels Bilateral  Result Date: 5/22/2025  EXAM: RESTING ANKLE-BRACHIAL INDICES (ABIs) LOCATION: [facility] DATE: 5/22/2025 INDICATION: PVD, wounds, cellulitis COMPARISON: None. EVELYNE FINDINGS:  RIGHT Brachial: Ankle (PT): 79 Index: 0.68 Ankle (DP): 24 Index: 0.21 LEFT Brachial: 117 Ankle (PT): 19 Index: 0.16 Ankle (DP): 160 Index: 1.37 The right EVELYNE at rest is 0.68. The left EVELYNE at rest is 1.37.  WAVEFORMS: Monophasic bilaterally.     IMPRESSION: 1.  RIGHT LOWER EXTREMITY: EVELYNE is 0.68, in keeping with moderate resting arterial insufficiency. Waveform at the ankle is monophasic. 2.  LEFT LOWER EXTREMITY: EVELYNE of 1.37 with monophasic waveform at the ankle, consistent with spurious recording secondary to vessel noncompressibility.        XR Tibia and Fibula Right 2 Views  Result Date: 5/21/2025  EXAM: XR FEMUR RIGHT 2 VIEWS, XR TIBIA AND FIBULA RIGHT 2 VIEWS LOCATION: [facility] DATE: 5/21/2025 INDICATION: R thigh pain, infection COMPARISON: None.     IMPRESSION: No osseous erosive or destructive change of the right femur, tibia, or fibula to suggest osteomyelitis radiographically. Diffuse osseous demineralization. Mild degenerative arthritis right hip and knee. Arterial calcifications. Surgical clips  along the medial aspect of the right lower extremity. No soft  tissue gas. No knee joint effusion.    XR Femur Right 2 Views  Result Date: 5/21/2025  EXAM: XR FEMUR RIGHT 2 VIEWS, XR TIBIA AND FIBULA RIGHT 2 VIEWS LOCATION: [facility] DATE: 5/21/2025 INDICATION: R thigh pain, infection COMPARISON: None.     IMPRESSION: No osseous erosive or destructive change of the right femur, tibia, or fibula to suggest osteomyelitis radiographically. Diffuse osseous demineralization. Mild degenerative arthritis right hip and knee. Arterial calcifications. Surgical clips  along the medial aspect of the right lower extremity. No soft tissue gas. No knee joint effusion.      Data reviewed today: I reviewed all medications, new labs and imaging results over the last 24 hours. I personally reviewed the xr right leg image(s) showing no deep infection or fracture.  The patient's care was discussed with the Patient.

## 2025-05-22 NOTE — CONSULTS
"[facility] Nurse Inpatient Assessment     Consulted for: bilateral lower extremities    Summary: 5/22 Patient lives in a nursing home, they provide wound care but patient states \"it is really fast\".  Has minimal feeling in feet, right leg sore/tender    [facility] nurse follow-up plan: weekly    Patient History (according to provider note(s):      Ever Crane is a 80 year old male admitted on 5/21/2025. He was brought to the ED by ambulance from nursing facility for evaluation of right leg redness     Assessment:      Skin Injury Location: BLE          5/22                                                                  right foot \"toes\"                                            rt lateral                                                          rt medial                                                        rt heel          5/22 left anterior                                         left heel                                                      left lateral                                                 left medial                                                 rt knee  Last photo: 5/22/25  Skin injury due to: Venous stasis dermatitis and right knee tracking could be cellulitis  Skin history and plan of care:   patient states legs have been like this a long time, right leg is more tender than left, left looks better overall.  Several wounds to both scattered throughout, right leg has two larger open areas.  Remnants of dry alginate stuck to some wounds, not enough drainage seen for an alginate dressing.  This may change as wound care progresses.  Both heels intact, with dry skin.  Patient moves independently while in bed.  Plan is to hydrate and protect every day  Affected area:      Skin assessment: Intact, Denudement, Dry/flaky, Edema, Hemosiderin staining, Scaly, and Superficial scabbing     Measurements (length x width x depth, in cm) unable to obtain      Color: dusky, pink, and " red     Temperature  left leg normal temp, right leg warm      Drainage: scant .      Color: serous      Odor: none  Pain: mild, tender on right leg, denies pain to left leg  Pain interventions prior to dressing change: slow and gentle cares   Treatment goal: Heal , Infection control/prevention, Increase moisture , and Protection  STATUS: initial assessment  Supplies ordered: ordered Xeroform and supplies stored on unit       Treatment Plan:     BLE wound(s): Daily  and PRN if dressing soiled, saturated or falls off  Moisten roll of Kerlix with Vashe, wring out excess.  Wrap from just below knee to toes, let sit for 20-30 minutes.  Air dry  Apply layer of Sween cream from just below knees to toes.  Use two pieces of Xeroform on left leg covering shin and calf, secure with dry roll of kerlix  Cover right shin and calf with three pieces of Xeroform and one Xeroform covering right foot/toes, secure with dry roll of kerlix     Orders: Written    RECOMMEND PRIMARY TEAM ORDER: None, at this time  Education provided: plan of care  Discussed plan of care with: Patient and Nurse  Notify WOC if wound(s) deteriorate.  Nursing to notify the Provider(s) and re-consult the WOC Nurse if new skin concern.    DATA:     Current support surface: Standard  Standard gel mattress (Isoflex)  Containment of urine/stool: Continent of bladder and Continent of bowel  BMI: Body mass index is 24.41 kg/m .   Active diet order: Orders Placed This Encounter      Combination Diet Moderate Consistent Carb (60 g CHO per Meal) Diet; Low Saturated Fat Na <2400mg Diet     Output: I/O last 3 completed shifts:  In: 865 [IV Piggyback:865]  Out: -      Labs:   Recent Labs   Lab 05/22/25  0653 05/21/25 2056   HGB 11.1* 12.8*   WBC 9.3 9.4   A1C  --  6.6*     Pressure injury risk assessment:   Sensory Perception: 3-->slightly limited  Moisture: 4-->rarely moist  Activity: 2-->chairfast  Mobility: 3-->slightly limited  Nutrition: 3-->adequate  Friction and  Shear: 3-->no apparent problem  Clint Score: 18    Renny Parsons BSN RN CWOCN  Pager no longer in use, please contact through [employer] group: [facility]

## 2025-05-22 NOTE — PLAN OF CARE
Problem: Adult Inpatient Plan of Care  Goal: Absence of Hospital-Acquired Illness or Injury  Intervention: Prevent Skin Injury  Recent Flowsheet Documentation  Taken 5/22/2025 0301 by Carlos Mcfarlane RN  Body Position: position changed independently  Intervention: Prevent and Manage VTE (Venous Thromboembolism) Risk  Recent Flowsheet Documentation  Taken 5/22/2025 0301 by Carlos Mcfarlane RN  VTE Prevention/Management: SCDs off (sequential compression devices)  Intervention: Prevent Infection  Recent Flowsheet Documentation  Taken 5/22/2025 0301 by Carlos Mcfarlane RN  Infection Prevention: hand hygiene promoted  Goal: Optimal Comfort and Wellbeing  Intervention: Provide Person-Centered Care  Recent Flowsheet Documentation  Taken 5/22/2025 0301 by Carlos Mcfarlane RN  Trust Relationship/Rapport: care explained     Problem: Delirium  Goal: Improved Behavioral Control  Intervention: Minimize Safety Risk  Recent Flowsheet Documentation  Taken 5/22/2025 0301 by Carlos Mcfarlane RN  Trust Relationship/Rapport: care explained  Goal: Improved Attention and Thought Clarity  Intervention: Maximize Cognitive Function  Recent Flowsheet Documentation  Taken 5/22/2025 0301 by Carlos Mcfarlane RN  Sensory Stimulation Regulation: quiet environment promoted  Reorientation Measures: calendar in view   Goal Outcome Evaluation:        Patient A&O X4, PRN Tylenol given X 1, RA. Fluid restriction 200 ml.     , 60 g carb diet.    Make needs known. Plan of care ongoing.    Carlos SALEH, RN

## 2025-05-22 NOTE — ED NOTES
Bed: JNED-04  Expected date: 5/21/25  Expected time:   Means of arrival:   Comments:  80M, chronic ulcers lower extremities, red streaking.

## 2025-05-22 NOTE — ED TRIAGE NOTES
Pt arrives via [facility] from a nursing home. Pt has bilateral leg ulcers that have red streaking up his right leg.

## 2025-05-22 NOTE — PHARMACY-VANCOMYCIN DOSING SERVICE
Pharmacy Vancomycin Initial Note  Date of Service May 21, 2025  Patient's  1945  80 year old, male    Indication: Skin and Soft Tissue Infection    Current estimated CrCl = Estimated Creatinine Clearance: 36.4 mL/min (A) (based on SCr of 1.87 mg/dL (H)).    Creatinine for last 3 days  2025:  8:56 PM Creatinine 1.87 mg/dL    Recent Vancomycin Level(s) for last 3 days  No results found for requested labs within last 3 days.      Vancomycin IV Administrations (past 72 hours)        No vancomycin orders with administrations in past 72 hours.                    Nephrotoxins and other renal medications (From now, onward)      Start     Dose/Rate Route Frequency Ordered Stop    25 2230  vancomycin (VANCOCIN) 1,500 mg in 0.9% NaCl 265 mL intermittent infusion         1,500 mg  over 90 Minutes Intravenous ONCE 25 2210              Contrast Orders - past 72 hours (72h ago, onward)      None                Plan:  Start vancomycin  1500 mg IV x 1 dose.   Please re consult pharmacy if vancomycin is to be continued on admission.     Viviana Lopes RP

## 2025-05-22 NOTE — DISCHARGE INSTRUCTIONS
WOC DISCHARGE INSTRUCTIONS:  BLE wound(s): Daily  and PRN if dressing soiled, saturated or falls off  Moisten roll of Kerlix with Vashe, wring out excess.  Wrap from just below knee to toes, let sit for 20-30 minutes.  Air dry  Apply layer of Sween cream from just below knees to toes.  Use two pieces of Xeroform on left leg covering shin and calf, secure with dry roll of kerlix  Cover right shin and calf with three pieces of Xeroform and one Xeroform covering right foot/toes, secure with dry roll of kerlix

## 2025-05-22 NOTE — PHARMACY-ADMISSION MEDICATION HISTORY
Pharmacist Admission Medication History    Admission medication history is complete. The information provided in this note is only as accurate as the sources available at the time of the update.    Information Source(s): [place]/MAR via paper-copy    Pertinent Information: [facility] [place] Unable to confirm last dose administrations, however dispense history displays good adherence.    Changes made to PTA medication list:  Added: None  Deleted: Naloxone, Tylenol (scheduled)  Changed: None    Allergies reviewed with patient and updates made in EHR: yes    Medication History Completed By: Levy Hamilton [facility] 5/21/2025 11:02 PM    PTA Med List   Medication Sig Last Dose/Taking    acetaminophen (TYLENOL) 325 MG tablet Take 650 mg by mouth 2 times daily as needed for mild pain Taking As Needed    albuterol (PROAIR HFA/PROVENTIL HFA/VENTOLIN HFA) 108 (90 Base) MCG/ACT inhaler Inhale 2 puffs into the lungs 2 times daily as needed Taking As Needed    cetirizine (ZYRTEC) 5 MG tablet Take 5 mg by mouth daily Unknown    clopidogrel (PLAVIX) 75 MG tablet Take 75 mg by mouth daily Unknown    clotrimazole-betamethasone (LOTRISONE) 1-0.05 % external cream Apply topically 2 times daily Unknown    furosemide (LASIX) 40 MG tablet Take 40 mg by mouth daily Unknown    gabapentin (NEURONTIN) 100 MG capsule Take 200 mg by mouth 2 times daily Unknown    glimepiride (AMARYL) 1 MG tablet Take 1 mg by mouth every morning (before breakfast) Unknown    hypromellose (ARTIFICIAL TEARS) 0.5 % SOLN ophthalmic solution Place 1 drop Into the left eye 4 times daily as needed for dry eyes Taking As Needed    ketoconazole (NIZORAL) 2 % external shampoo Apply topically twice a week Mon and Fri. Unknown    losartan (COZAAR) 25 MG tablet Take 12.5 mg by mouth daily Unknown    omeprazole (PRILOSEC) 20 MG CR capsule Take 20 mg by mouth daily Unknown    rivaroxaban  ANTICOAGULANT (XARELTO) 15 MG TABS tablet Take 20 mg by mouth daily Unknown    Semaglutide, 1 MG/DOSE, (OZEMPIC) 4 MG/3ML pen Inject 1 mg subcutaneously every 7 days. Unknown    simvastatin (ZOCOR) 40 MG tablet Take 40 mg by mouth At Bedtime Unknown    spironolactone (ALDACTONE) 25 MG tablet Take 25 mg by mouth daily Unknown    tamsulosin (FLOMAX) 0.4 MG capsule Take 0.4 mg by mouth daily Unknown

## 2025-05-22 NOTE — PLAN OF CARE
Problem: Adult Inpatient Plan of Care  Goal: Optimal Comfort and Wellbeing  Intervention: Monitor Pain and Promote Comfort  Recent Flowsheet Documentation  Taken 5/22/2025 1213 by Amanda Victoria RN  Pain Management Interventions: medication (see MAR)     Problem: Adult Inpatient Plan of Care  Goal: Plan of Care Review  Description: The Plan of Care Review/Shift note should be completed every shift.  The Outcome Evaluation is a brief statement about your assessment that the patient is improving, declining, or no change.  This information will be displayed automatically on your shift  note.  Outcome: Not Progressing  Flowsheets (Taken 5/22/2025 1438)  Plan of Care Reviewed With: patient  Overall Patient Progress: improving   Goal Outcome Evaluation:      Plan of Care Reviewed With: patient    Overall Patient Progress: improvingOverall Patient Progress: improving     Arrived from ED at 1140. C/o leg pain and requesting Tylenol. Wounds currently wrapped up by WOC RN with Vashe soak. RLE hot and tender to the touch. Multiple scabs and redness. Red streaks up past knee. Req to eat lunch. In bed with bed alarm on, call light and urinal within reach.

## 2025-05-22 NOTE — PHARMACY-VANCOMYCIN DOSING SERVICE
Pharmacy Vancomycin Initial Note  Date of Service May 22, 2025  Patient's  1945  80 year old, male    Indication: Skin and Soft Tissue Infection    Current estimated CrCl = Estimated Creatinine Clearance: 36.4 mL/min (A) (based on SCr of 1.87 mg/dL (H)).    Creatinine for last 3 days  2025:  8:56 PM Creatinine 1.87 mg/dL    Recent Vancomycin Level(s) for last 3 days  No results found for requested labs within last 3 days.      Vancomycin IV Administrations (past 72 hours)                     vancomycin (VANCOCIN) 1,500 mg in 0.9% NaCl 265 mL intermittent infusion (mg) 1,500 mg New Bag 25 225                    Nephrotoxins and other renal medications (From now, onward)      Start     Dose/Rate Route Frequency Ordered Stop    25 2300  vancomycin (VANCOCIN) 1,000 mg in 200 mL dextrose intermittent infusion         1,000 mg  200 mL/hr over 1 Hours Intravenous EVERY 24 HOURS 25 0128      25 0800  [Held by provider]  furosemide (LASIX) tablet 40 mg        (On hold since today at 0102 until manually unheld; held by Lauro Gutierres MDHold Reason: Acute Kidney Injury)   Note to Pharmacy: PTA Sig:Take 40 mg by mouth daily      40 mg Oral DAILY 25 0102              Contrast Orders - past 72 hours (72h ago, onward)      None            Insight[employer] Prediction of Planned Initial Vancomycin Regimen  Loading dose: N/A  Regimen: 1000 mg IV every 24 hours.  Start time: 22:55 on 2025  Exposure target: AUC24 (range) 400-600 mg/L.hr   AUC24,ss: 553 mg/L.hr  Probability of AUC24 > 400: 83 %  Ctrough,ss: 18.4 mg/L  Probability of Ctrough,ss > 20: 42 %  Probability of nephrotoxicity (Lodise MAMI ): 15 %          Plan:  Start vancomycin  1000 mg IV q24h.   Vancomycin monitoring method: AUC  Vancomycin therapeutic monitoring goal: 400-600 mg*h/L  Pharmacy will check vancomycin levels as appropriate in 1-3 Days.    Serum creatinine levels will be ordered daily for the first week of  therapy and at least twice weekly for subsequent weeks.      Toni Tobias, [facility]

## 2025-05-23 LAB
ANION GAP SERPL CALCULATED.3IONS-SCNC: 12 MMOL/L (ref 7–15)
BACTERIA SPEC CULT: NORMAL
BACTERIA SPEC CULT: NORMAL
BASOPHILS # BLD AUTO: 0.1 10E3/UL (ref 0–0.2)
BASOPHILS NFR BLD AUTO: 1 %
BUN SERPL-MCNC: 32.8 MG/DL (ref 8–23)
CALCIUM SERPL-MCNC: 9.5 MG/DL (ref 8.8–10.4)
CHLORIDE SERPL-SCNC: 101 MMOL/L (ref 98–107)
CREAT SERPL-MCNC: 1.46 MG/DL (ref 0.67–1.17)
EGFRCR SERPLBLD CKD-EPI 2021: 48 ML/MIN/1.73M2
EOSINOPHIL # BLD AUTO: 0.3 10E3/UL (ref 0–0.7)
EOSINOPHIL NFR BLD AUTO: 4 %
ERYTHROCYTE [DISTWIDTH] IN BLOOD BY AUTOMATED COUNT: 13.7 % (ref 10–15)
GLUCOSE BLDC GLUCOMTR-MCNC: 124 MG/DL (ref 70–99)
GLUCOSE BLDC GLUCOMTR-MCNC: 132 MG/DL (ref 70–99)
GLUCOSE BLDC GLUCOMTR-MCNC: 132 MG/DL (ref 70–99)
GLUCOSE BLDC GLUCOMTR-MCNC: 148 MG/DL (ref 70–99)
GLUCOSE BLDC GLUCOMTR-MCNC: 157 MG/DL (ref 70–99)
GLUCOSE SERPL-MCNC: 126 MG/DL (ref 70–99)
HCO3 SERPL-SCNC: 25 MMOL/L (ref 22–29)
HCT VFR BLD AUTO: 34.8 % (ref 40–53)
HGB BLD-MCNC: 11.5 G/DL (ref 13.3–17.7)
IMM GRANULOCYTES # BLD: 0 10E3/UL
IMM GRANULOCYTES NFR BLD: 1 %
LYMPHOCYTES # BLD AUTO: 1 10E3/UL (ref 0.8–5.3)
LYMPHOCYTES NFR BLD AUTO: 13 %
MCH RBC QN AUTO: 27.6 PG (ref 26.5–33)
MCHC RBC AUTO-ENTMCNC: 33 G/DL (ref 31.5–36.5)
MCV RBC AUTO: 84 FL (ref 78–100)
MONOCYTES # BLD AUTO: 0.7 10E3/UL (ref 0–1.3)
MONOCYTES NFR BLD AUTO: 9 %
NEUTROPHILS # BLD AUTO: 5.6 10E3/UL (ref 1.6–8.3)
NEUTROPHILS NFR BLD AUTO: 73 %
NRBC # BLD AUTO: 0 10E3/UL
NRBC BLD AUTO-RTO: 0 /100
PLATELET # BLD AUTO: 195 10E3/UL (ref 150–450)
POTASSIUM SERPL-SCNC: 4.5 MMOL/L (ref 3.4–5.3)
RBC # BLD AUTO: 4.17 10E6/UL (ref 4.4–5.9)
SODIUM SERPL-SCNC: 138 MMOL/L (ref 135–145)
WBC # BLD AUTO: 7.7 10E3/UL (ref 4–11)

## 2025-05-23 PROCEDURE — 85041 AUTOMATED RBC COUNT: CPT | Performed by: INTERNAL MEDICINE

## 2025-05-23 PROCEDURE — 250N000012 HC RX MED GY IP 250 OP 636 PS 637: Performed by: HOSPITALIST

## 2025-05-23 PROCEDURE — 99221 1ST HOSP IP/OBS SF/LOW 40: CPT | Mod: GC | Performed by: STUDENT IN AN ORGANIZED HEALTH CARE EDUCATION/TRAINING PROGRAM

## 2025-05-23 PROCEDURE — 250N000013 HC RX MED GY IP 250 OP 250 PS 637: Performed by: HOSPITALIST

## 2025-05-23 PROCEDURE — 99233 SBSQ HOSP IP/OBS HIGH 50: CPT | Performed by: INTERNAL MEDICINE

## 2025-05-23 PROCEDURE — G0545 PR INHRENT VISIT TO INPT/OBS W CNFRM/SUSPCT INFCT DIS BY INFCT DIS SPCIALST: HCPCS | Performed by: INTERNAL MEDICINE

## 2025-05-23 PROCEDURE — 120N000001 HC R&B MED SURG/OB

## 2025-05-23 PROCEDURE — 250N000013 HC RX MED GY IP 250 OP 250 PS 637: Performed by: INTERNAL MEDICINE

## 2025-05-23 PROCEDURE — 36415 COLL VENOUS BLD VENIPUNCTURE: CPT | Performed by: INTERNAL MEDICINE

## 2025-05-23 PROCEDURE — 82947 ASSAY GLUCOSE BLOOD QUANT: CPT | Performed by: INTERNAL MEDICINE

## 2025-05-23 PROCEDURE — 250N000011 HC RX IP 250 OP 636: Performed by: HOSPITALIST

## 2025-05-23 PROCEDURE — 99232 SBSQ HOSP IP/OBS MODERATE 35: CPT | Performed by: INTERNAL MEDICINE

## 2025-05-23 RX ADMIN — GABAPENTIN 200 MG: 100 CAPSULE ORAL at 19:37

## 2025-05-23 RX ADMIN — PANTOPRAZOLE SODIUM 40 MG: 40 TABLET, DELAYED RELEASE ORAL at 08:50

## 2025-05-23 RX ADMIN — VANCOMYCIN HYDROCHLORIDE 1000 MG: 1 INJECTION, SOLUTION INTRAVENOUS at 22:09

## 2025-05-23 RX ADMIN — CETIRIZINE HYDROCHLORIDE 5 MG: 5 TABLET ORAL at 08:49

## 2025-05-23 RX ADMIN — RIVAROXABAN 15 MG: 15 TABLET, FILM COATED ORAL at 16:21

## 2025-05-23 RX ADMIN — ACETAMINOPHEN 650 MG: 325 TABLET ORAL at 16:21

## 2025-05-23 RX ADMIN — INSULIN ASPART 1 UNITS: 100 INJECTION, SOLUTION INTRAVENOUS; SUBCUTANEOUS at 17:59

## 2025-05-23 RX ADMIN — TAMSULOSIN HYDROCHLORIDE 0.4 MG: 0.4 CAPSULE ORAL at 08:50

## 2025-05-23 RX ADMIN — CLOPIDOGREL BISULFATE 75 MG: 75 TABLET, FILM COATED ORAL at 08:50

## 2025-05-23 RX ADMIN — GABAPENTIN 200 MG: 100 CAPSULE ORAL at 08:50

## 2025-05-23 RX ADMIN — SIMVASTATIN 40 MG: 40 TABLET, FILM COATED ORAL at 22:08

## 2025-05-23 ASSESSMENT — ACTIVITIES OF DAILY LIVING (ADL)
ADLS_ACUITY_SCORE: 48
ADLS_ACUITY_SCORE: 46
ADLS_ACUITY_SCORE: 46
ADLS_ACUITY_SCORE: 48
ADLS_ACUITY_SCORE: 42
ADLS_ACUITY_SCORE: 48
ADLS_ACUITY_SCORE: 46
ADLS_ACUITY_SCORE: 46
ADLS_ACUITY_SCORE: 48
ADLS_ACUITY_SCORE: 48
ADLS_ACUITY_SCORE: 42
ADLS_ACUITY_SCORE: 48
DEPENDENT_IADLS:: CLEANING;COOKING;LAUNDRY;SHOPPING;MEAL PREPARATION;MEDICATION MANAGEMENT;TRANSPORTATION
ADLS_ACUITY_SCORE: 42
ADLS_ACUITY_SCORE: 48
ADLS_ACUITY_SCORE: 48
ADLS_ACUITY_SCORE: 46
ADLS_ACUITY_SCORE: 42
ADLS_ACUITY_SCORE: 48
ADLS_ACUITY_SCORE: 42
ADLS_ACUITY_SCORE: 48
ADLS_ACUITY_SCORE: 48

## 2025-05-23 NOTE — CONSULTS
Care Management Initial Consult    General Information  Assessment completed with: (P) Patient,    Type of CM/SW Visit: (P) Initial Assessment    Primary Care Provider verified and updated as needed: (P) Yes   Readmission within the last 30 days: (P) no previous admission in last 30 days      Reason for Consult: (P) discharge planning  Advance Care Planning: Advance Care Planning Reviewed: (P) present on chart, verified with patient          Communication Assessment  Patient's communication style: (P) spoken language (English or Bilingual)    Hearing Difficulty or Deaf: no   Wear Glasses or Blind: yes    Cognitive  Cognitive/Neuro/Behavioral: WDL  Level of Consciousness: alert  Arousal Level: opens eyes spontaneously  Orientation: oriented x 4        Speech: clear, spontaneous    Living Environment:   People in home: (P) facility resident     Current living Arrangements: (P) extended care facility  Name of Facility: ([place]) [facility]   Able to return to prior arrangements: (P) yes       Family/Social Support:  Care provided by: (P) other (see comments) (staff)  Provides care for: (P) no one, unable/limited ability to care for self     Support system: (P) Children, Sibling(s) (3 sisters and a son who are involved)          Description of Support System: (P) Supportive, Involved         Current Resources:   Patient receiving home care services: (P) No        Community Resources: (P) Transitional Care  Equipment currently used at home: (P) none  Supplies currently used at home: (P) Wound Care Supplies, Compression Stockings, Diabetic Supplies    Employment/Financial:  Employment Status: (P) retired        Financial Concerns: (P) none           Does the patient's insurance plan have a 3 day qualifying hospital stay waiver?  No    Lifestyle & Psychosocial Needs:  Social Drivers of Health     Food Insecurity: Low Risk  (5/21/2025)    Food Insecurity     Within the past 12 months, did you worry that your food would  run out before you got money to buy more?: No     Within the past 12 months, did the food you bought just not last and you didn t have money to get more?: No   Depression: Not on file   Housing Stability: Low Risk  (5/21/2025)    Housing Stability     Do you have housing? : Yes     Are you worried about losing your housing?: No   Tobacco Use: Medium Risk (10/2/2024)    Patient History     Smoking Tobacco Use: Former     Smokeless Tobacco Use: Never     Passive Exposure: Not on file   Financial Resource Strain: Low Risk  (5/21/2025)    Financial Resource Strain     Within the past 12 months, have you or your family members you live with been unable to get utilities (heat, electricity) when it was really needed?: No   Alcohol Use: Not on file   Transportation Needs: Low Risk  (5/21/2025)    Transportation Needs     Within the past 12 months, has lack of transportation kept you from medical appointments, getting your medicines, non-medical meetings or appointments, work, or from getting things that you need?: No   Physical Activity: Not on file   Interpersonal Safety: Low Risk  (5/21/2025)    Interpersonal Safety     Do you feel physically and emotionally safe where you currently live?: Yes     Within the past 12 months, have you been hit, slapped, kicked or otherwise physically hurt by someone?: No     Within the past 12 months, have you been humiliated or emotionally abused in other ways by your partner or ex-partner?: No   Stress: Not on file   Social Connections: Unknown (4/11/2023)    Received from [facility] [employer] [facility]    Social Connections     Frequency of Communication with Friends and Family: Not on file   Health Literacy: Not on file       Functional Status:  Prior to admission patient needed assistance:   Dependent ADLs:: (P) Wheelchair-independent, Wheelchair-with assist, Ambulation-walker, Bathing, Dressing, Toileting  Dependent IADLs:: (P) Cleaning, Cooking, Laundry, Shopping, Meal  Preparation, Medication Management, Transportation       Mental Health Status:          Chemical Dependency Status:                Values/Beliefs:  Spiritual, Cultural Beliefs, [employer]ism Practices, Values that affect care:                 Discussed  Partnership in Safe Discharge Planning  document with patient/family: No    Additional Information:    Met with patient to review role of care management, progression of care and possible need for services at discharge, including OP services, home care, or skilled nursing care. Patient alert, oriented and engaged in the conversation. Writer then verified patient demographics and updated any changes needed in the patient chart.  [place] [facility] [place]. They assist him with his adls/iadls. He uses a wheelchair.  Patient has 3 sisters and son on his contact list that he states are involved and supportive   Plat to return to [facility]\  CM left message for [facility] asking for return call to keep in contact for discharge.       Next Steps: communicate with [facility] on discharge timing    Idania Linn, RNCC    ]

## 2025-05-23 NOTE — PLAN OF CARE
"Goal Outcome Evaluation:    Pt is A/O x 3. Sometimes forgetful/confused to place. Pt thinks his room is connected to hospital room. C/o generalized pain, but wasn't able to pin point where. Denied meds. BLE wrapped. Change kerilix on RLE, it was weeping and bleeding on bottom \"big toe\" area. Is assist of 1 to SBA with a walker and walked to bathroom but end up using urinal instead. Both IV on upper/lower right arm are patent and saline locked. Blood sugar was 132.  VSS on RA.           Problem: Adult Inpatient Plan of Care  Goal: Plan of Care Review  Description: The Plan of Care Review/Shift note should be completed every shift.  The Outcome Evaluation is a brief statement about your assessment that the patient is improving, declining, or no change.  This information will be displayed automatically on your shift  note.  Outcome: Progressing  Goal: Patient-Specific Goal (Individualized)  Description: You can add care plan individualizations to a care plan. Examples of Individualization might be:  \"Parent requests to be called daily at 9am for status\", \"I have a hard time hearing out of my right ear\", or \"Do not touch me to wake me up as it startles  me\".  Outcome: Progressing  Goal: Absence of Hospital-Acquired Illness or Injury  Outcome: Progressing  Intervention: Identify and Manage Fall Risk  Recent Flowsheet Documentation  Taken 5/23/2025 0300 by Gerson Ashley RN  Safety Promotion/Fall Prevention:   activity supervised   clutter free environment maintained   nonskid shoes/slippers when out of bed   patient and family education   room near nurse's station   room organization consistent   safety round/check completed  Intervention: Prevent Skin Injury  Recent Flowsheet Documentation  Taken 5/23/2025 0300 by Gerson Ashley, [place]  Body Position: position changed independently  Intervention: Prevent and Manage VTE (Venous Thromboembolism) Risk  Recent Flowsheet Documentation  Taken 5/23/2025 0300 by Gerson Ashley RN  VTE " Prevention/Management: SCDs off (sequential compression devices)  Intervention: Prevent Infection  Recent Flowsheet Documentation  Taken 5/23/2025 0300 by Gerson Ashley RN  Infection Prevention: hand hygiene promoted  Goal: Optimal Comfort and Wellbeing  Outcome: Progressing  Goal: Readiness for Transition of Care  Outcome: Progressing     Problem: Delirium  Goal: Optimal Coping  Outcome: Progressing  Goal: Improved Behavioral Control  Outcome: Progressing  Intervention: Minimize Safety Risk  Recent Flowsheet Documentation  Taken 5/23/2025 0300 by Gerson Ashley RN  Enhanced Safety Measures:   pain management   room near unit station  Goal: Improved Attention and Thought Clarity  Outcome: Progressing  Goal: Improved Sleep  Outcome: Progressing     Problem: Infection  Goal: Absence of Infection Signs and Symptoms  Outcome: Progressing  Intervention: Prevent or Manage Infection  Recent Flowsheet Documentation  Taken 5/23/2025 0300 by Gerson Ashley RN  Isolation Precautions: contact precautions maintained

## 2025-05-23 NOTE — PROGRESS NOTES
Infectious Diseases Progress Note  [facility]    Date of visit: 05/23/2025       ASSESSMENT   80-year-old man with a history of heart failure, A-fib, peripheral vascular disease, hypertension, diabetes, coronary artery disease, hyperlipidemia admitted with right leg lymphangitic cellulitis.    Right lower extremity cellulitis.  Chronic right lower extremity wounds.  Found to have red streaking into the right thigh without fevers or pain.  Admitted for treatment of cellulitis.  Chronic venous stasis dermatitis bilateral lower extremities.  Likely source of infection  History of penicillin allergy.  Documented that he has tolerated Zosyn and Augmentin before.    Active Problems:    Essential hypertension    Stage 3b chronic kidney disease (H)    Heart failure with preserved ejection fraction, NYHA class II (H)    Persistent atrial fibrillation (H)    Type 2 diabetes mellitus with other circulatory complication, with long-term current use of insulin (H)    Chronic obstructive pulmonary disease, unspecified (H)    Dementia, unspecified dementia severity, unspecified dementia type, unspecified whether behavioral, psychotic, or mood disturbance or anxiety (H)    Cellulitis of right lower extremity       PLAN   -Continue vancomycin   -stop cefepime   -Follow-up on blood cultures  -Continue wound cares  -Monitor clinical response to antibiotics      Remigio Guan MD  [facility]  Direct messaging: [email or web address] Paging  On-Call ID provider: [phone], option: [phone]      ===========================================      SUBJECTIVE / INTERVAL HISTORY:     No events. Weeping and bleeding through wounds especially when walking - painful, this is chronic. No thigh pain.            Antibiotics   Cefepime 5/21-  Vancomycin 5/21-    Previous:  None      Physical Exam     Temp:  [97.4  F (36.3  C)-98.7  F (37.1  C)] 98.2  F (36.8  C)  Pulse:  [69-89] 71  Resp:  [16-18] 16  BP: (108-124)/(55-62)  124/58  SpO2:  [94 %-98 %] 98 %    /58 (BP Location: Right arm)   Pulse 71   Temp 98.2  F (36.8  C) (Oral)   Resp 16   Ht 1.829 m (6')   Wt 78.7 kg (173 lb 6.4 oz)   SpO2 98%   BMI 23.52 kg/m      GENERAL:  well-developed, well-nourished, lying in bed in no acute distress.   HENT:  Head is normocephalic, atraumatic.   EYES:  Eyes have anicteric sclerae without conjunctival injection   LUNGS:  normal respiratory pattern   EXT: Extremities warm and without edema.  Lower extremities are wrapped.  Right thigh markings are fading, nontender. Mild warmth  See pictures below  SKIN:  No acute rashes.   NEUROLOGIC:  Grossly nonfocal.                  Cultures   5/21 blood cultures x 2: No growth to date    No results found for the last 90 days.         Pertinent Labs:     Recent Labs   Lab 05/23/25 0722 05/22/25 0653 05/21/25 2056   WBC 7.7 9.3 9.4   HGB 11.5* 11.1* 12.8*    209 221       Recent Labs   Lab 05/23/25  0722 05/22/25  0653 05/21/25 2056    136 136   CO2 25 26 25   BUN 32.8* 39.1* 40.9*       Recent Labs   Lab 05/21/25 2056   CRPI 131.50*   SED 54*           Imaging:     US Lower Extremity Arterial Duplex Bilateral  Result Date: 5/22/2025  EXAM: US LOWER EXTREMITY ARTERIAL DUPLEX BILATERAL LOCATION: [facility] DATE: 5/22/2025 INDICATION: PAD, wounds COMPARISON: 3/24/2003. TECHNIQUE: Duplex utilizing 2D gray-scale imaging, Doppler interrogation with color-flow and spectral waveform analysis. FINDINGS: RIGHT LOWER EXTREMITY ARTERIAL ASSESSMENT: External iliac artery 96/16 cm/s Common femoral artery: 76/9 cm/s Profunda femoris artery-prox: 103/10 cm/s SFA (proximal): 74/10 cm/s SFA (mid): 136/22 cm/s SFA (distal): 83/17 cm/s Popliteal artery - prox: 57/12 cm/s Popliteal artery - dist: 56/14 cm/s Posterior tibial artery - ankle: 18/6 cm/s Anterior tibial artery - ankle: 98/25 cm/s Dorsalis pedis artery - ankle: 71/21 cm/s Diffuse vascular calcification. Brisk  biphasic flow is seen at the external iliac, common femoral, profunda femoral, and proximal superficial femoral arteries. Waveforms become monophasic in the mid-distal SFA continuing into the popliteal. There is mild  velocity elevation in the mid SFA. Findings favor diffuse disease. There is very dampened poststenotic monophasic flow distally in the posterior tibial consistent with significant proximal stenosis or occlusion. Brisker monophasic flow is present in the  anterior tibial and dorsalis pedis. Focused evaluation of a site of pain in the right foot amputation stump shows no sonographic abnormality. LEFT LOWER EXTREMITY ARTERIAL ASSESSMENT: External iliac artery 96/20 cm/s Common femoral artery: 74 cm/s Profunda femoris artery - prox: 41/7 cm/s SFA (proximal): 57 cm/s SFA (mid): 76 cm/s SFA (distal): 42 cm/s Popliteal artery - prox: 32/8 cm/s Popliteal artery - dist: 30/5 cm/s Posterior tibial artery - ankle: 55 cm/s Anterior tibial artery - ankle: 41/8 cm/s Dorsalis pedis artery - ankle: 34/6 cm/s Diffuse vascular calcification. There is brisk biphasic flow in the external iliac, common femoral, profunda femoral, and throughout the SFA and popliteal. Distal posterior tibial waveform is brisk and biphasic. Dorsalis pedis and distal anterior tibial are monophasic but brisk.     IMPRESSION: 1.  No sonographic evidence of inflow disease. 2.  Right lower extremity: Abnormal mid-distal SFA and popliteal waveforms with mild mid SFA velocity elevation, favoring diffuse femoral-popliteal disease. Severe disease noted in the posterior tibial artery. Brisker monophasic flow in the anterior tibial/dorsal pedal runoff. 3.  Left lower extremity: Brisk biphasic flow through the popliteal without evidence of significant femoral-popliteal stenosis. Posterior tibial runoff is also biphasic, while there is brisk monophasic flow in the dorsalis pedis and anterior tibial.    US EVELYNE Doppler No Exercise 1-2 Levels  Bilateral  Result Date: 5/22/2025  EXAM: RESTING ANKLE-BRACHIAL INDICES (ABIs) LOCATION: [facility] DATE: 5/22/2025 INDICATION: PVD, wounds, cellulitis COMPARISON: None. EVELYNE FINDINGS:  RIGHT Brachial: Ankle (PT): 79 Index: 0.68 Ankle (DP): 24 Index: 0.21 LEFT Brachial: 117 Ankle (PT): 19 Index: 0.16 Ankle (DP): 160 Index: 1.37 The right EVELYNE at rest is 0.68. The left EVELYNE at rest is 1.37.  WAVEFORMS: Monophasic bilaterally.     IMPRESSION: 1.  RIGHT LOWER EXTREMITY: EVELYNE is 0.68, in keeping with moderate resting arterial insufficiency. Waveform at the ankle is monophasic. 2.  LEFT LOWER EXTREMITY: EVELYNE of 1.37 with monophasic waveform at the ankle, consistent with spurious recording secondary to vessel noncompressibility.        XR Tibia and Fibula Right 2 Views  Result Date: 5/21/2025  EXAM: XR FEMUR RIGHT 2 VIEWS, XR TIBIA AND FIBULA RIGHT 2 VIEWS LOCATION: [facility] DATE: 5/21/2025 INDICATION: R thigh pain, infection COMPARISON: None.     IMPRESSION: No osseous erosive or destructive change of the right femur, tibia, or fibula to suggest osteomyelitis radiographically. Diffuse osseous demineralization. Mild degenerative arthritis right hip and knee. Arterial calcifications. Surgical clips  along the medial aspect of the right lower extremity. No soft tissue gas. No knee joint effusion.    XR Femur Right 2 Views  Result Date: 5/21/2025  EXAM: XR FEMUR RIGHT 2 VIEWS, XR TIBIA AND FIBULA RIGHT 2 VIEWS LOCATION: [facility] DATE: 5/21/2025 INDICATION: R thigh pain, infection COMPARISON: None.     IMPRESSION: No osseous erosive or destructive change of the right femur, tibia, or fibula to suggest osteomyelitis radiographically. Diffuse osseous demineralization. Mild degenerative arthritis right hip and knee. Arterial calcifications. Surgical clips  along the medial aspect of the right lower extremity. No soft tissue gas. No knee joint  effusion.          Data reviewed today: I reviewed all medications, new labs and imaging results over the last 24 hours. I personally reviewed no images or EKG's today.  The patient's care was discussed with the Bedside Nurse and Patient.

## 2025-05-23 NOTE — PROGRESS NOTES
[facility]    Medicine Progress Note - Hospitalist Service    Date of Admission:  5/21/2025    Assessment & Plan   80 year old male with history of PAD, A. Fib, HFpEF, DM2, CKD3, dementia and BPH admitted on 5/21/2025 with RLE cellulitis.        Right lower extremity cellulitis  Bilateral lower extremity wounds, present on admission  Right lower extremity x-rays without osseous erosive or destructive changes to suggest osteomyelitis, no soft tissue gas  CRP inflammation 131.5, ESR 54  - Continue IV vancomycin (history of MRSA colonization) per ID  - stop cefepime   - Wound care consult      PAD:    Arterial US shows bilat LE PAD, EVELYNE RLE 0.68, LLE 1.37  -- continue statin, plavix and Xarelto  -- consult vascular      MESHA on CKD3:  -- improved with IVF  -- ok to resume lasix and spironolactone  -- continue to hold PTA losartan  -- trend        Chronic HFpEF  - resume home diuretics as above       DM2 with polyneuropathy and retinopathy without long-term current use of insulin  - Hold PTA glimepiride    - Hold PTA semaglutide while inpatient  - Continue PTA gabapentin for neuropathy  - continue Medium intensity sliding scale insulin  - escalate as needed        Paroxysmal atrial fibrillation  - Not on rate controlling medications  - continue xarelto, reduce dose to 15 mg daily per pharmacy based on GFR       BPH  - Continue PTA tamsulosin     Dementia without behavioral disturbance  - Supportive cares          Diet: Fluid restriction 2000 ML FLUID  Moderate Consistent Carb (60 g CHO per Meal) Diet  Snacks/Supplements Adult: Glucerna; With Meals  Snacks/Supplements Adult: Expedite Cup; With Meals    DVT Prophylaxis: DOAC  Dahl Catheter: Not present  Lines: None     Cardiac Monitoring: None  Code Status: Full Code      Clinically Significant Risk Factors          # Hypochloremia: Lowest Cl = 97 mmol/L in last 2 days, will monitor as appropriate          # Hypertension: Noted on problem list      # Dementia: noted on problem list       # DMII: A1C = 6.6 % (Ref range: <5.7 %) within past 6 months, PRESENT ON ADMISSION   # Severe Malnutrition: based on nutrition assessment and treatment provided per dietitian's recommendations., PRESENT ON ADMISSION   # Financial/Environmental Concerns: (P) none   # History of CABG: noted on surgical history       Disposition Plan   Medically Ready for Discharge: Anticipated in 2-4 Days      Derrick Andersen DO  Hospitalist Service  [facility]  Securely message with [employer] (more info)  Text page via [employer] Paging/Directory   ______________________________________________________________________    Interval History   NAD. Denies any current complaints    Physical Exam   Vital Signs: Temp: 98.2  F (36.8  C) Temp src: Oral BP: 124/58 Pulse: 71   Resp: 16 SpO2: 98 % O2 Device: None (Room air)    Weight: 173 lbs 6.4 oz  General: NAD  RESPIRATORY: Breathing nonlabored  CARDIOVASCULAR: No le edema bilat.   NEUROLOGIC: Alert, speech clear         >50 MINUTES SPENT BY ME on the date of service doing chart review, history, exam, documentation & further activities per the note.  Data

## 2025-05-23 NOTE — PLAN OF CARE
Goal Outcome Evaluation:         CM will continue to follow and update Parmly on discharge timing

## 2025-05-23 NOTE — PROGRESS NOTES
"CLINICAL NUTRITION SERVICES - ASSESSMENT NOTE    RECOMMENDATIONS FOR MDs/PROVIDERS TO ORDER:    Registered Dietitian Interventions:  Glucerna bid and expedite cup    Future/Additional Recommendations:  Monitor po/supplement intake, weight, labs, wound   healing     REASON FOR ASSESSMENT  Positive admission nutrition risk screen: Due to weight loss and decreased appetite PTA    Pt with a past medical history of heart failure, A fib, PVD, hypertension, DM 2, CAD, hyperlipidemia admitted with right leg lymphangitic cellulitis.  He has chronic right lower extremity wounds.  Chronic venous stasis dermatitis bilateral lower extremities.  Likely source of infection    INFORMATION OBTAINED  Assessed patient in room.    NUTRITION HISTORY  Pt states he quit eating lunch and supper meals at nursing home where he stays due to \"They don't know how to make it right\" He has been eating breakfast there and states that's pretty good.  He did say his son brings him in subway sandwiches and pizza at times.  He states he has lost ~ 10 lb in one month    CURRENT NUTRITION ORDERS  Diet: Moderate Consistent Carbohydrate with FR of 2000 ml/day    CURRENT INTAKE/TOLERANCE  Ate 100% lunch and supper meals on 5/22    LABS  Nutrition-relevant labs: Urea nitrogen 32.8 (H), Cr 1.46 (H), Glu 126,132(H)    MEDICATIONS  Nutrition-relevant medications: plavix, lasix, neurontin, novolog, pantoprazole, zarelto, zocor, aldactone, flomax, vancocin    ANTHROPOMETRICS  Height: 182.9 cm (6' 0\")  Admission Weight: 81.6 kg (180 lb) (05/21/25 2031)   Most Recent Weight: 78.7 kg (173 lb 6.4 oz) (05/23/25 0457)  IBW: 80.9 kg (178 lb)  BMI: Body mass index is 23.52 kg/m .   Weight History:   Wt Readings from Last 10 Encounters:   05/23/25 78.7 kg (173 lb 6.4 oz)   04/14/25 84.7 kg (186 lb 12.8 oz)   02/07/25 86.4 kg (190 lb 6.4 oz)   01/16/25 86.6 kg (191 lb)   12/09/24 86.2 kg (190 lb 1.6 oz)   10/02/24 87.7 kg (193 lb 4.8 oz)   07/30/24 85.8 kg (189 lb 3.2 oz) "   07/09/24 84.8 kg (187 lb)   06/18/24 85.5 kg (188 lb 9.6 oz)   05/30/24 85.7 kg (188 lb 14.4 oz)   Weight down 13 lb in ~ 1 month (7%)    Dosing Weight: 78.7 kg, based on actual wt    ASSESSED NUTRITION NEEDS  Estimated Energy Needs: 8311-4172 kcals/day (25 - 30 kcals/kg)  Justification: Maintenance  Estimated Protein Needs: 79-95 +/- grams protein/day (1 - 1.2 grams of pro/kg)  Justification: Wound healing  Estimated Fluid Needs: 2000 mL/day Justification: On a fluid restriction    SYSTEM AND PHYSICAL FINDINGS    GI symptoms: WDL, last BM 5/21/2025  Skin/wounds: venous stasis dermatitis with several wound scattered to both Lower extremities    MALNUTRITION  % Intake: </=75% for >/= 1 month (severe)  % Weight Loss: > 5% in 1 month (severe)   Subcutaneous Fat Loss: None observed  Muscle Loss: Clavicles (pectoralis and deltoids): Mild  Fluid Accumulation/Edema: 1+ to 2+  Malnutrition Diagnosis: Severe malnutrition in the context of social or environmental circumstances  Malnutrition Present on Admission: Yes    NUTRITION DIAGNOSIS  Malnutrition (undernutrition) related to social or environmental circumstances as evidenced by po intake < 75%>/= 1 month and > 5% weight loss in 1 month    INTERVENTIONS  Medical food supplement therapy: Glucerna bid and expedite cup daily for wound healing    GOALS  Blood glucose < 180  Patient to consume % of nutritionally adequate meal trays TID, or the equivalent with supplements/snacks.  Weight maintenance 78.7 Kg +/- 2 Kg     MONITORING/EVALUATION  Progress toward goals will be monitored and evaluated per policy.

## 2025-05-23 NOTE — CONSULTS
Vascular surgery consultation    This is a 80-year-old male with PAD, A-fib on Xarelto, heart failure with preserved ejection fraction, diabetes, and CKD 3 here for cellulitis of the right lower extremity.  The patient does have chronic venous wounds for years.  The patient noticed worsening of these wounds and pain, and was sent from living facility.    The patient is feeling better now.  No fevers.  No neurologic symptoms at this time.  The patient walks on walker.    Prior vascular interventions include:  Diagnostic angiogram of the right lower extremity this showed AT runoff.  Therapeutic angiogram of the left lower extremity with angioplasty of peroneal artery and TP trunk.    On exam,  NAD  Bilateral femorals are palpable  Bilateral legs are wrapped with dressing.  The pictures are reviewed consistent with the venous wounds, but there is also wounds on the TMA site as well.  Monophasic DP on the right.  Monophasic DP on the left.  Streak of erythema coming from the knee to the thigh, suspicious for lymphangitis    Images reviewed.  EVELYNE is 0.68 on the right side and 1.37 on the left.  Duplex shows diffuse atherosclerotic disease on the right side with monophasic waveforms starting from mid SFA down to the foot with AT runoff.  Left side with more brisk biphasic flow onto popliteal and into the PT with brisk systolic upstroke.  DP and AT are monophasic with brisk upstroke.    A1c is 6.6.  No leukocytosis.  CRP is elevated 131.    Impression: Right lower extremity CL TI with nonhealing wounds on the TMA and recurrent cellulitis.    There is venous wounds component to this, but also has inadequate blood flow given ulceration on the TMA site.    Will plan for angiogram as either outpatient or inpatient if the patient stays in coming weeks.  Please continue antibiotics.  Recommend obtaining podiatry consult for manage of the foot ulcers.    Seen and discussed with Dr. Deb Pham MD  Vascular Surgery  Fellow

## 2025-05-23 NOTE — PLAN OF CARE
Problem: Delirium  Goal: Improved Sleep  Outcome: Progressing     Problem: Infection  Goal: Absence of Infection Signs and Symptoms  Outcome: Progressing  Intervention: Prevent or Manage Infection  Recent Flowsheet Documentation  Taken 5/22/2025 1612 by Elizabeth Grier RN  Isolation Precautions: contact precautions maintained   Goal Outcome Evaluation:       Patient A/O x4 but is forgetful. VSS on RA. Pain 5/10 managed with scheduled and PRN tylenol. BLE wrapped. 1 assist with walker to the bathroom. Blood sugars 136 and 162 this shift no coverage needed. IV abx given as ordered. Contact precautions remain in place. Call light within reach and bed alarm activated.

## 2025-05-23 NOTE — PLAN OF CARE
Goal Outcome Evaluation:                          A&Ox4. Assist of 1 with walker. Denies pain at rest but endorses pain with weightbearing on RLE. Blood sugars were 132 and 124. Red streaks on right thigh. Good appetite.

## 2025-05-24 ENCOUNTER — APPOINTMENT (OUTPATIENT)
Dept: RADIOLOGY | Facility: HOSPITAL | Age: 80
End: 2025-05-24
Attending: PODIATRIST
Payer: MEDICARE

## 2025-05-24 PROBLEM — N17.9 ACUTE KIDNEY INJURY SUPERIMPOSED ON CKD: Status: ACTIVE | Noted: 2022-06-21

## 2025-05-24 PROBLEM — N18.9 ACUTE KIDNEY INJURY SUPERIMPOSED ON CKD: Status: ACTIVE | Noted: 2022-06-21

## 2025-05-24 LAB
ANION GAP SERPL CALCULATED.3IONS-SCNC: 11 MMOL/L (ref 7–15)
BUN SERPL-MCNC: 23.6 MG/DL (ref 8–23)
CALCIUM SERPL-MCNC: 9.4 MG/DL (ref 8.8–10.4)
CHLORIDE SERPL-SCNC: 105 MMOL/L (ref 98–107)
CREAT SERPL-MCNC: 1.13 MG/DL (ref 0.67–1.17)
EGFRCR SERPLBLD CKD-EPI 2021: 66 ML/MIN/1.73M2
GLUCOSE BLDC GLUCOMTR-MCNC: 112 MG/DL (ref 70–99)
GLUCOSE BLDC GLUCOMTR-MCNC: 115 MG/DL (ref 70–99)
GLUCOSE BLDC GLUCOMTR-MCNC: 152 MG/DL (ref 70–99)
GLUCOSE BLDC GLUCOMTR-MCNC: 154 MG/DL (ref 70–99)
GLUCOSE BLDC GLUCOMTR-MCNC: 170 MG/DL (ref 70–99)
GLUCOSE SERPL-MCNC: 136 MG/DL (ref 70–99)
HCO3 SERPL-SCNC: 23 MMOL/L (ref 22–29)
HOLD SPECIMEN: NORMAL
POTASSIUM SERPL-SCNC: 4.4 MMOL/L (ref 3.4–5.3)
SODIUM SERPL-SCNC: 139 MMOL/L (ref 135–145)
VANCOMYCIN SERPL-MCNC: 15.1 UG/ML (ref ?–25)

## 2025-05-24 PROCEDURE — 250N000013 HC RX MED GY IP 250 OP 250 PS 637: Performed by: HOSPITALIST

## 2025-05-24 PROCEDURE — 120N000001 HC R&B MED SURG/OB

## 2025-05-24 PROCEDURE — 73620 X-RAY EXAM OF FOOT: CPT | Mod: RT

## 2025-05-24 PROCEDURE — 250N000013 HC RX MED GY IP 250 OP 250 PS 637: Performed by: INTERNAL MEDICINE

## 2025-05-24 PROCEDURE — 73630 X-RAY EXAM OF FOOT: CPT | Mod: LT

## 2025-05-24 PROCEDURE — G0545 PR INHRENT VISIT TO INPT/OBS W CNFRM/SUSPCT INFCT DIS BY INFCT DIS SPCIALST: HCPCS | Performed by: INTERNAL MEDICINE

## 2025-05-24 PROCEDURE — 99232 SBSQ HOSP IP/OBS MODERATE 35: CPT | Performed by: INTERNAL MEDICINE

## 2025-05-24 PROCEDURE — 99222 1ST HOSP IP/OBS MODERATE 55: CPT | Performed by: PODIATRIST

## 2025-05-24 PROCEDURE — 82947 ASSAY GLUCOSE BLOOD QUANT: CPT | Performed by: INTERNAL MEDICINE

## 2025-05-24 PROCEDURE — 80202 ASSAY OF VANCOMYCIN: CPT | Performed by: INTERNAL MEDICINE

## 2025-05-24 PROCEDURE — 36415 COLL VENOUS BLD VENIPUNCTURE: CPT | Performed by: INTERNAL MEDICINE

## 2025-05-24 PROCEDURE — 99233 SBSQ HOSP IP/OBS HIGH 50: CPT | Performed by: HOSPITALIST

## 2025-05-24 RX ORDER — FLUCONAZOLE 150 MG/1
150 TABLET ORAL DAILY
Status: DISCONTINUED | OUTPATIENT
Start: 2025-05-24 | End: 2025-05-26 | Stop reason: HOSPADM

## 2025-05-24 RX ORDER — LINEZOLID 600 MG/1
600 TABLET, FILM COATED ORAL EVERY 12 HOURS SCHEDULED
Status: DISCONTINUED | OUTPATIENT
Start: 2025-05-24 | End: 2025-05-26 | Stop reason: HOSPADM

## 2025-05-24 RX ADMIN — CETIRIZINE HYDROCHLORIDE 5 MG: 5 TABLET ORAL at 08:24

## 2025-05-24 RX ADMIN — RIVAROXABAN 15 MG: 15 TABLET, FILM COATED ORAL at 17:22

## 2025-05-24 RX ADMIN — FUROSEMIDE 40 MG: 20 TABLET ORAL at 08:23

## 2025-05-24 RX ADMIN — FLUCONAZOLE 150 MG: 150 TABLET ORAL at 13:03

## 2025-05-24 RX ADMIN — INSULIN ASPART 1 UNITS: 100 INJECTION, SOLUTION INTRAVENOUS; SUBCUTANEOUS at 17:25

## 2025-05-24 RX ADMIN — GABAPENTIN 200 MG: 100 CAPSULE ORAL at 19:02

## 2025-05-24 RX ADMIN — CLOPIDOGREL BISULFATE 75 MG: 75 TABLET, FILM COATED ORAL at 08:24

## 2025-05-24 RX ADMIN — GABAPENTIN 200 MG: 100 CAPSULE ORAL at 08:27

## 2025-05-24 RX ADMIN — PANTOPRAZOLE SODIUM 40 MG: 40 TABLET, DELAYED RELEASE ORAL at 08:23

## 2025-05-24 RX ADMIN — LINEZOLID 600 MG: 600 TABLET, FILM COATED ORAL at 13:02

## 2025-05-24 RX ADMIN — LINEZOLID 600 MG: 600 TABLET, FILM COATED ORAL at 19:02

## 2025-05-24 RX ADMIN — TAMSULOSIN HYDROCHLORIDE 0.4 MG: 0.4 CAPSULE ORAL at 08:23

## 2025-05-24 RX ADMIN — SPIRONOLACTONE 25 MG: 25 TABLET, FILM COATED ORAL at 08:23

## 2025-05-24 RX ADMIN — ACETAMINOPHEN 650 MG: 325 TABLET ORAL at 08:23

## 2025-05-24 ASSESSMENT — ACTIVITIES OF DAILY LIVING (ADL)
ADLS_ACUITY_SCORE: 48
ADLS_ACUITY_SCORE: 46
ADLS_ACUITY_SCORE: 48
ADLS_ACUITY_SCORE: 48
ADLS_ACUITY_SCORE: 47
ADLS_ACUITY_SCORE: 48
ADLS_ACUITY_SCORE: 47
ADLS_ACUITY_SCORE: 48
ADLS_ACUITY_SCORE: 46
ADLS_ACUITY_SCORE: 48
ADLS_ACUITY_SCORE: 46
ADLS_ACUITY_SCORE: 47
ADLS_ACUITY_SCORE: 49
ADLS_ACUITY_SCORE: 46
ADLS_ACUITY_SCORE: 47
ADLS_ACUITY_SCORE: 46
ADLS_ACUITY_SCORE: 47
ADLS_ACUITY_SCORE: 49
ADLS_ACUITY_SCORE: 46
ADLS_ACUITY_SCORE: 46

## 2025-05-24 NOTE — CONSULTS
PATIENT HISTORY:  Ever Crane is a 80-year-old man with a history of heart failure, A-fib, peripheral vascular disease, hypertension, diabetes, coronary artery disease, hyperlipidemia h/o TMA left foot and TMA right and partial calcanectomy right, now also with RLE cellulitis with chronic wound and critical limb threatening ischemia,     I was asked to see Ever Crane  by Nancy Chavarria MD for evaluation of the right foot with history of chronic wounds, is limb salvageable.  He was last seen by Dr Spears in 2023.         PAST MEDICAL HISTORY:   Past Medical History:   Diagnosis Date    A-fib (H)     Acute diastolic heart failure (H) 06/07/2022    Acute posthemorrhagic anemia 05/17/2022    MESHA (acute kidney injury)     Anemia     Atopic keratoconjunctivitis     Atrial fibrillation (H)     Brian Moe: 8/2011 Cardioversion; CHADS2 VASC = 5; he is on warfarin and sotalol     Atrial flutter (H)     Mcgarry's esophagus 01/01/2012    per note of Dr. Tavo Mike of [facility]    Bilateral lower leg cellulitis 08/31/2018    BPH (benign prostatic hyperplasia)     Candidiasis of perineum 01/03/2018    Carotid stenosis     Carotid stenosis, asymptomatic, right     Cellulitis     CHF (congestive heart failure) (H)     Cholecystitis 10/14/2019    Cholecystitis, acute 08/18/2019    Chronic systolic heart failure (H)     Chronic venous stasis dermatitis     Closed nondisplaced intertrochanteric fracture of left femur with routine healing, subsequent encounter 05/18/2022    Clostridium difficile colitis     Contact with and (suspected) exposure to covid-19 12/13/2021    COPD (chronic obstructive pulmonary disease) (H)     Coronary artery disease due to lipid rich plaque 2000    CABG x2    Coronary atherosclerosis     Diabetes (H)     Diabetic ulcer of both feet (H) 10/31/2017    Diabetic ulcer of toe of right foot associated with diabetes mellitus due to underlying condition, limited to breakdown of skin (H) 01/05/2023     Diabetic ulcer of toe of right foot associated with diabetes mellitus due to underlying condition, with necrosis of bone (H) 01/05/2023    Dyslexia     Dyslipidemia, goal LDL below 70 2000    Epistaxis     Essential hypertension     Gangrene of left foot (H)     GERD (gastroesophageal reflux disease)     HLD (hyperlipidemia)     HTN (hypertension)     Hyponatremia     Ischemic heart disease     Lymphedema     Mild cognitive impairment     MRSA (methicillin resistant Staphylococcus aureus)     Neuropathy     Non-STEMI (non-ST elevated myocardial infarction) (H)     Occlusion and stenosis of right carotid artery     Osteoarthrosis     Osteomyelitis of ankle and foot (H)     Other atopic dermatitis     PAD (peripheral artery disease)     Peripheral vascular disease     Pneumonia 06/26/2018    Polyneuropathy due to type 2 diabetes mellitus (H)     Pressure ulcer, heel     Bilateral    Renal insufficiency     Type 2 diabetes mellitus, without long-term current use of insulin (H)     Ulcer of right foot (H)     Unable to function independently 11/13/2017        PAST SURGICAL HISTORY:   Past Surgical History:   Procedure Laterality Date    AMPUTATE FOOT Left 11/05/2017    Procedure: LEFT TRANSMETATARSAL AMPUTATION;  Surgeon: Ever Wick MD;  Location: [facility];  Service:     AMPUTATE FOOT Right 4/27/2023    Procedure: Transmetatarsal amputation right foot;  Surgeon: Miguelangel Spears DPM;  [place] [facility] [place]    AMPUTATE FOOT Right 5/15/2023    Procedure: partial calcanectomy;  Surgeon: Miguelangel Spears DPM;  Location: [facility]    BYPASS GRAFT ARTERY CORONARY N/A 03/06/2000    SVG to OM1, SVG to PDA    BYPASS GRAFT ARTERY CORONARY N/A 10/04/2018    redo CABG due to graft failure    CABG MEASURES GRP      CARDIOVERSION  08/25/2011    CV CORONARY ANGIOGRAM N/A 10/01/2018    Procedure: Coronary Angiogram;  Surgeon: Miki Mac MD;  Location: [facility];  Service:      INCISION AND DRAINAGE FOOT, COMBINED Right 5/15/2023    Procedure: INCISION AND DRAINAGE, right heel with,;  Surgeon: Miguelangel Spears DPM;  Location: [facility] [place] [facility]    INCISION AND DRAINAGE FOOT, COMBINED Bilateral 6/1/2023    Procedure: INCISION AND DRAINAGE, right foot with debridement of ulceration bilateral heels;  Surgeon: Miguelangel Spears DPM;  Location: [facility]    INGUINAL HERNIA REPAIR Bilateral 1969    and 1979    IR EXTREMITY ANGIOGRAM BILATERAL  11/3/2017    IR LOWER EXTREMITY ANGIOGRAM LEFT  3/10/2023    IR LOWER EXTREMITY ANGIOGRAM RIGHT  1/24/2023    LAPAROSCOPIC CHOLECYSTECTOMY N/A 08/18/2019    Procedure: CHOLECYSTECTOMY, LAPAROSCOPIC;  Surgeon: Stewart Fountain MD;  Location: [facility] OR;  Service: General    LENGTHEN TENDON ACHILLES Right 4/27/2023    Procedure: with Achilles tendon lengthening, debridement of right heel ulceration.;  Surgeon: Miguelangel Spears DPM;  Location: [facility]        MEDICATIONS:   Current Facility-Administered Medications:     acetaminophen (TYLENOL) tablet 650 mg, 650 mg, Oral, Q4H PRN, 650 mg at 05/24/25 0823 **OR** acetaminophen (TYLENOL) Suppository 650 mg, 650 mg, Rectal, Q4H PRN, Lauro Gutierres MD    albuterol (PROVENTIL HFA/VENTOLIN HFA) inhaler, 2 puff, Inhalation, BID PRN, Lauro Gutierres MD    artificial saliva (BIOTENE MT) solution 1 spray, 1 spray, Mouth/Throat, 4x Daily PRN, Lauro Gutierres MD    calcium carbonate (TUMS) chewable tablet 1,000 mg, 1,000 mg, Oral, 4x Daily PRN, Lauro Gutierres MD    carboxymethylcellulose PF (REFRESH PLUS) 0.5 % ophthalmic solution 1 drop, 1 drop, Left Eye, 4x Daily PRN, Lauro Gutierres MD    cetirizine (zyrTEC) tablet 5 mg, 5 mg, Oral, Daily, Lauro Gutierres MD, 5 mg at 05/24/25 0824    clopidogrel (PLAVIX) tablet 75 mg, 75 mg, Oral, Daily, Lauro Gutierres MD, 75 mg at 05/24/25 0824    glucose gel 15-30 g, 15-30 g, Oral, Q15 Min PRN **OR** dextrose 50  % injection 25-50 mL, 25-50 mL, Intravenous, Q15 Min PRN **OR** glucagon injection 1 mg, 1 mg, Subcutaneous, Q15 Min PRN, Lauro Gutierres MD    furosemide (LASIX) tablet 40 mg, 40 mg, Oral, Daily, Derrick Andersen DO, 40 mg at 05/24/25 0823    gabapentin (NEURONTIN) capsule 200 mg, 200 mg, Oral, BID, Lauro Gutierres MD, 200 mg at 05/24/25 0827    guaiFENesin (ROBITUSSIN) 20 mg/mL solution 200 mg, 200 mg, Oral, Q4H PRN, Lauro Gutierres MD    insulin aspart (NovoLOG) injection (RAPID ACTING), 1-7 Units, Subcutaneous, TID AC, Lauro Gutierres MD, 1 Units at 05/23/25 1759    insulin aspart (NovoLOG) injection (RAPID ACTING), 1-5 Units, Subcutaneous, At Bedtime, Lauro Gutierres MD    lidocaine (LMX4) cream, , Topical, Q1H PRN, Lauro Gutierres MD    lidocaine 1 % 0.1-1 mL, 0.1-1 mL, Other, Q1H PRN, Lauro Gutierres MD    [Held by provider] losartan (COZAAR) half-tab 12.5 mg, 12.5 mg, Oral, Daily, Lauro Gutierres MD    melatonin tablet 1 mg, 1 mg, Oral, At Bedtime PRN, Lauro Gutierres MD    menthol-zinc oxide (CALMOSEPTINE) 0.44-20.6 % ointment OINT, , Topical, 4x Daily PRN, Lauro Gutierres MD    miconazole (MICATIN) 2 % powder, , Topical, BID PRN, Lauro Gutierres MD    ondansetron (ZOFRAN ODT) ODT tab 4 mg, 4 mg, Oral, Q6H PRN **OR** ondansetron (ZOFRAN) injection 4 mg, 4 mg, Intravenous, Q6H PRN, Lauro Gutierres MD    pantoprazole (PROTONIX) EC tablet 40 mg, 40 mg, Oral, Daily, Lauro Gutierres MD, 40 mg at 05/24/25 0823    Patient is already receiving anticoagulation with heparin, enoxaparin (LOVENOX), warfarin (COUMADIN)  or other anticoagulant medication, , Does not apply, Continuous PRN, Lauro Gutierres MD    prochlorperazine (COMPAZINE) injection 5 mg, 5 mg, Intravenous, Q6H PRN **OR** prochlorperazine (COMPAZINE) tablet 5 mg, 5 mg, Oral, Q6H PRN, Lauro Gutierres MD    rivaroxaban ANTICOAGULANT (XARELTO) tablet 15 mg, 15 mg, Oral, Daily with supper,  Derrick Andersen DO, 15 mg at 25 1621    senna-docusate (SENOKOT-S/PERICOLACE) 8.6-50 MG per tablet 1 tablet, 1 tablet, Oral, BID PRN **OR** senna-docusate (SENOKOT-S/PERICOLACE) 8.6-50 MG per tablet 2 tablet, 2 tablet, Oral, BID PRN, Lauro Gutierres MD    simvastatin (ZOCOR) tablet 40 mg, 40 mg, Oral, At Bedtime, Lauro Gutierres MD, 40 mg at 25 2208    sodium chloride (PF) 0.9% PF flush 3 mL, 3 mL, Intracatheter, Q8H MARCIA, Lauro Gutierres MD, 3 mL at 25 2209    sodium chloride (PF) 0.9% PF flush 3 mL, 3 mL, Intracatheter, q1 min prn, Lauro Gutierres MD    spironolactone (ALDACTONE) tablet 25 mg, 25 mg, Oral, Daily, Derrick Andersen DO, 25 mg at 25 0823    tamsulosin (FLOMAX) capsule 0.4 mg, 0.4 mg, Oral, Daily, Lauro Gutierres MD, 0.4 mg at 25 0823    vancomycin (VANCOCIN) 1,250 mg in 0.9% NaCl 262.5 mL intermittent infusion, 1,250 mg, Intravenous, Q24H, Tabatha Beltran MD     ALLERGIES:    Allergies   Allergen Reactions    Lanolin      Other Reaction(s): Not available    Cranberry Extract Itching and Rash    Doxycycline Rash    Latex Rash    Penicillin V Rash     Reaction occurred as a child. Patient tolerated Zosyn 2018, Cefazolin 10/2018, and has also tolerated Augmentin.    Penicillins Rash     Reaction occurred as a child. Patient tolerated Zosyn 2018, Cefazolin 10/2018, and has also tolerated Augmentin.        SOCIAL HISTORY:   Social History     Socioeconomic History    Marital status:      Spouse name: Not on file    Number of children: 1    Years of education: Not on file    Highest education level: Not on file   Occupational History    Not on file   Tobacco Use    Smoking status: Former     Current packs/day: 0.00     Average packs/day: 1 pack/day for 36.0 years (36.0 ttl pk-yrs)     Types: Cigarettes     Start date: 1964     Quit date: 2000     Years since quittin.0    Smokeless tobacco: Never   Substance and Sexual Activity  "   Alcohol use: Not Currently     Alcohol/week: 5.0 standard drinks of alcohol     Types: 1 Cans of beer, 4 Standard drinks or equivalent per week    Drug use: No    Sexual activity: Not Currently     Partners: Female   Other Topics Concern    Not on file   Social History Narrative    Ever lived with his son Raciel in [place], [place], but then he went to [facility] after foot amputation.  Ever states he will move to a facility in [place] in June, 2018. 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  Our reception staff at [facility] in [place] confirmed on June 13, 2018 that Ever brought himself to the campus and did not utilize a family member or ride service.  I  would also note that he states his granddaughter is name Claire and not Leonela, so I corrected that in the chart (ROSALIA Moe MD). He lives in [facility] in 2019 and is now not driving as he states \"I got a ticket\".        Social Drivers of Health     Financial Resource Strain: Low Risk  (5/21/2025)    Financial Resource Strain     Within the past 12 months, have you or your family members you live with been unable to get utilities (heat, electricity) when it was really needed?: No   Food Insecurity: Low Risk  (5/21/2025)    Food Insecurity     Within the past 12 months, did you worry that your food would run out before you got money to buy more?: No     Within the past 12 months, did the food you bought just not last and you didn t have money to get more?: No   Transportation Needs: Low Risk  (5/21/2025)    Transportation Needs     Within the past 12 months, has lack of transportation kept you from medical appointments, getting your medicines, non-medical meetings or appointments, work, or from getting things that you need?: No   Physical Activity: Not on file   Stress: Not on file   Social Connections: Unknown " (4/11/2023)    Received from [facility] [employer] [facility]    Social Connections     Frequency of Communication with Friends and Family: Not on file   Interpersonal Safety: Low Risk  (5/21/2025)    Interpersonal Safety     Do you feel physically and emotionally safe where you currently live?: Yes     Within the past 12 months, have you been hit, slapped, kicked or otherwise physically hurt by someone?: No     Within the past 12 months, have you been humiliated or emotionally abused in other ways by your partner or ex-partner?: No   Housing Stability: Low Risk  (5/21/2025)    Housing Stability     Do you have housing? : Yes     Are you worried about losing your housing?: No        FAMILY HISTORY:   Family History   Problem Relation Age of Onset    Sudden Death Mother 85.00    CABG Father     Valvular heart disease Father     Esophageal Cancer Father 58.00        cause of death    No Known Problems Son     No Known Problems Sister     Obesity Sister     Osteoarthritis Sister     No Known Problems Sister     No Known Problems Grandchild     No Known Problems Grandchild         EXAM:Vitals: /58 (BP Location: Right arm)   Pulse 69   Temp 97.7  F (36.5  C) (Oral)   Resp 20   Ht 1.829 m (6')   Wt 76.9 kg (169 lb 8.5 oz)   SpO2 94%   BMI 22.99 kg/m    BMI= Body mass index is 22.99 kg/m .    LABS:      WBC : 7.7    Hemoglobin: 11.5    HA1C:  6.6    CRP: 131.50    ESR: 54    General appearance: Patient is alert and fully cooperative with history & exam.  No sign of distress is noted during the visit.      Psychiatric: Affect is pleasant & appropriate.  Patient appears motivated to improve health.       Respiratory: Breathing is regular & unlabored while sitting.      HEENT: Hearing is intact to spoken word.  Speech is clear.  No gross evidence of visual impairment that would impact ambulation.       Dermatologic: Dressings are intact to bilateral lower extremity.  The right foot is unwrapped.   There are multiple wounds, superficial at the distal plantar TMA site and heel.  No malodor or purulence noted.  Overall improved on appearance from the media pics 2 days ago     Vascular: DP & PT pulses nonplapable.  Feet are warm to touch     Neurologic: Lower extremity sensation is diminished to light touch.  No evidence of weakness or contracture in the lower extremities.       Musculoskeletal: Bilateral TMA     Media Information    Document Information    Other: Photograph   Woc 5/22 rt foot   05/22/2025 11:33 AM   Attached To:   Hospital Encounter on 5/21/25   Source Information    Yefri Parsons, RN  [facility]   Document History       Media Information    Document Information    Other: Photograph   Woc 5/22 05/22/2025 11:32 AM   Attached To:   Hospital Encounter on 5/21/25   Source Information    Yefri Parsons, RN  [facility]   Document History      IMAGING: I personally reviewed images.    Narrative & Impression   EXAM: US LOWER EXTREMITY ARTERIAL DUPLEX BILATERAL  LOCATION: [facility]  DATE: 5/22/2025     INDICATION: PAD, wounds  COMPARISON: 3/24/2003.  TECHNIQUE: Duplex utilizing 2D gray-scale imaging, Doppler interrogation with color-flow and spectral waveform analysis.     FINDINGS:     RIGHT LOWER EXTREMITY ARTERIAL ASSESSMENT:  External iliac artery 96/16 cm/s  Common femoral artery: 76/9 cm/s  Profunda femoris artery-prox: 103/10 cm/s  SFA (proximal): 74/10 cm/s  SFA (mid): 136/22 cm/s  SFA (distal): 83/17 cm/s  Popliteal artery - prox: 57/12 cm/s  Popliteal artery - dist: 56/14 cm/s  Posterior tibial artery - ankle: 18/6 cm/s  Anterior tibial artery - ankle: 98/25 cm/s  Dorsalis pedis artery - ankle: 71/21 cm/s     Diffuse vascular calcification. Brisk biphasic flow is seen at the external iliac, common femoral, profunda femoral, and proximal superficial femoral arteries. Waveforms become monophasic in the mid-distal SFA continuing into the  popliteal. There is mild   velocity elevation in the mid SFA. Findings favor diffuse disease. There is very dampened poststenotic monophasic flow distally in the posterior tibial consistent with significant proximal stenosis or occlusion. Brisker monophasic flow is present in the   anterior tibial and dorsalis pedis.  Focused evaluation of a site of pain in the right foot amputation stump shows no sonographic abnormality.     LEFT LOWER EXTREMITY ARTERIAL ASSESSMENT:  External iliac artery 96/20 cm/s  Common femoral artery: 74 cm/s  Profunda femoris artery - prox: 41/7 cm/s  SFA (proximal): 57 cm/s  SFA (mid): 76 cm/s  SFA (distal): 42 cm/s  Popliteal artery - prox: 32/8 cm/s  Popliteal artery - dist: 30/5 cm/s  Posterior tibial artery - ankle: 55 cm/s  Anterior tibial artery - ankle: 41/8 cm/s  Dorsalis pedis artery - ankle: 34/6 cm/s     Diffuse vascular calcification. There is brisk biphasic flow in the external iliac, common femoral, profunda femoral, and throughout the SFA and popliteal. Distal posterior tibial waveform is brisk and biphasic. Dorsalis pedis and distal anterior tibial   are monophasic but brisk.                                                                      IMPRESSION:  1.  No sonographic evidence of inflow disease.     2.  Right lower extremity: Abnormal mid-distal SFA and popliteal waveforms with mild mid SFA velocity elevation, favoring diffuse femoral-popliteal disease. Severe disease noted in the posterior tibial artery. Brisker monophasic flow in the anterior   tibial/dorsal pedal runoff.     3.  Left lower extremity: Brisk biphasic flow through the popliteal without evidence of significant femoral-popliteal stenosis. Posterior tibial runoff is also biphasic, while there is brisk monophasic flow in the dorsalis pedis and anterior tibial.       Narrative & Impression   EXAM: RESTING ANKLE-BRACHIAL INDICES (ABIs)  LOCATION: [facility]  DATE: 5/22/2025      INDICATION: PVD, wounds, cellulitis  COMPARISON: None.     EVELYNE FINDINGS:    RIGHT  Brachial:  Ankle (PT): 79 Index: 0.68  Ankle (DP): 24 Index: 0.21     LEFT  Brachial: 117  Ankle (PT): 19 Index: 0.16  Ankle (DP): 160 Index: 1.37     The right EVELYNE at rest is 0.68. The left EVELYNE at rest is 1.37.       WAVEFORMS: Monophasic bilaterally.                                                                      IMPRESSION:  1.  RIGHT LOWER EXTREMITY: EVELYNE is 0.68, in keeping with moderate resting arterial insufficiency. Waveform at the ankle is monophasic.  2.  LEFT LOWER EXTREMITY: EVELYNE of 1.37 with monophasic waveform at the ankle, consistent with spurious recording secondary to vessel noncompressibility.               ASSESSMENT: 80-year-old man with a history of heart failure, A-fib, peripheral vascular disease, hypertension, diabetes, coronary artery disease, hyperlipidemia h/o TMA left foot and TMA right and partial calcanectomy right, now also with RLE cellulitis with chronic wound and critical limb threatening ischemia,      PLAN:  Reviewed patient's chart in epic.  Antibiotics - Vancomycin per Infectious Disease  [facility] nursing consult - appreciate cares and recommendations - overall appearance of the foot looks improved today as compared to admission photos with antibiotics and wound care  Vascular Surgery with tentative plan for angiogram in the coming weeks either inpatient or outpatient pending on length of patient stay in hospital  Xray bilateral foot pending  Discussed with patient that surgical intervention should be considered if there is any evidence of bone infection - patient states he will not have any further foot surgery, or amputation  Prevalon boots were discussed with the patient including the importance of prevention of pressure wounds on the heels.  He verbalizes understanding yet states he won't use them or any similar device.  Is willing to have a pillow placed behind the calf to help offload the  heels - discussed with nursing staff  Will follow up on xrays    Juju Roberts, LINO    9:37 AM

## 2025-05-24 NOTE — PROGRESS NOTES
Infectious Diseases Progress Note  [facility]    Date of visit: 05/24/2025       ASSESSMENT   80-year-old man with a history of heart failure, A-fib, peripheral vascular disease, hypertension, diabetes, coronary artery disease, hyperlipidemia admitted with right leg lymphangitic cellulitis.    Right lower extremity cellulitis.  Chronic right lower extremity wounds.  Found to have red streaking into the right thigh without fevers or pain.  Admitted for treatment of cellulitis.  Chronic venous stasis dermatitis bilateral lower extremities.  Likely source of infection  History of penicillin allergy.  Documented that he has tolerated Zosyn and Augmentin before.    Active Problems:    Essential hypertension    Stage 3b chronic kidney disease (H)    Heart failure with preserved ejection fraction, NYHA class II (H)    Persistent atrial fibrillation (H)    Type 2 diabetes mellitus with other circulatory complication, with long-term current use of insulin (H)    Chronic obstructive pulmonary disease, unspecified (H)    Dementia, unspecified dementia severity, unspecified dementia type, unspecified whether behavioral, psychotic, or mood disturbance or anxiety (H)    Cellulitis of right lower extremity       PLAN   Legs improved  Also some tinea pedis  Lots of maceration and moisture  Cx MRSA    Discontinue iv vanco  Po linezolid do 10 days total  Fluconazole short course    ID recheck Monday of still here    Shane Lee M.D.        ===========================================      SUBJECTIVE / INTERVAL HISTORY:     Feels better              Antibiotics   Cefepime 5/21-  Vancomycin 5/21-    Previous:  None      Physical Exam     Temp:  [97.4  F (36.3  C)-98.2  F (36.8  C)] 97.7  F (36.5  C)  Pulse:  [68-78] 69  Resp:  [16-20] 20  BP: (103-124)/(55-59) 124/58  SpO2:  [94 %-96 %] 94 %    /58 (BP Location: Right arm)   Pulse 69   Temp 97.7  F (36.5  C) (Oral)   Resp 20   Ht 1.829 m (6')   Wt 76.9 kg (169  lb 8.5 oz)   SpO2 94%   BMI 22.99 kg/m      GENERAL:  well-developed, well-nourished, lying in bed in no acute distress.   HENT:  Head is normocephalic, atraumatic.   EYES:  Eyes have anicteric sclerae without conjunctival injection   LUNGS:  normal respiratory pattern   EXT:   Dressings removed  Dermatitis  Fading redness RLE  Not excessively tender  Not hot  Wet  Excellent ROM right knee    See pictures below  SKIN:  No acute rashes.   NEUROLOGIC:  Grossly nonfocal.                  Cultures   5/21 blood cultures x 2: No growth to date    Susceptibility data from last 90 days.  Collected Specimen Info Organism   05/22/25 Swab from Nares, Bilateral Staphylococcus aureus MRSA          Pertinent Labs:     Recent Labs   Lab 05/23/25  0722 05/22/25  0653 05/21/25 2056   WBC 7.7 9.3 9.4   HGB 11.5* 11.1* 12.8*    209 221       Recent Labs   Lab 05/24/25  0728 05/23/25  0722 05/22/25  0653    138 136   CO2 23 25 26   BUN 23.6* 32.8* 39.1*       Recent Labs   Lab 05/21/25 2056   CRPI 131.50*   SED 54*           Imaging:     XR Foot Right 2 Views  Result Date: 5/24/2025  EXAM: XR FOOT RIGHT 2 VIEWS LOCATION: [facility] DATE: 5/24/2025 INDICATION: history of TMA and heel debridement, chronic wounds at distal TMA site and heel.  ? osteomyelitis or deeper infection COMPARISON: 10/31/2017     IMPRESSION: Transmetatarsal amputation. The amputation margins are well-corticated without osseous erosive or destructive change. No osseous erosive or destructive change of the calcaneus. No soft tissue gas. Mild scattered degenerative arthritis. Plantar calcaneal spur.    US Lower Extremity Arterial Duplex Bilateral  Result Date: 5/22/2025  EXAM: US LOWER EXTREMITY ARTERIAL DUPLEX BILATERAL LOCATION: [facility] DATE: 5/22/2025 INDICATION: PAD, wounds COMPARISON: 3/24/2003. TECHNIQUE: Duplex utilizing 2D gray-scale imaging, Doppler interrogation with color-flow and  spectral waveform analysis. FINDINGS: RIGHT LOWER EXTREMITY ARTERIAL ASSESSMENT: External iliac artery 96/16 cm/s Common femoral artery: 76/9 cm/s Profunda femoris artery-prox: 103/10 cm/s SFA (proximal): 74/10 cm/s SFA (mid): 136/22 cm/s SFA (distal): 83/17 cm/s Popliteal artery - prox: 57/12 cm/s Popliteal artery - dist: 56/14 cm/s Posterior tibial artery - ankle: 18/6 cm/s Anterior tibial artery - ankle: 98/25 cm/s Dorsalis pedis artery - ankle: 71/21 cm/s Diffuse vascular calcification. Brisk biphasic flow is seen at the external iliac, common femoral, profunda femoral, and proximal superficial femoral arteries. Waveforms become monophasic in the mid-distal SFA continuing into the popliteal. There is mild  velocity elevation in the mid SFA. Findings favor diffuse disease. There is very dampened poststenotic monophasic flow distally in the posterior tibial consistent with significant proximal stenosis or occlusion. Brisker monophasic flow is present in the  anterior tibial and dorsalis pedis. Focused evaluation of a site of pain in the right foot amputation stump shows no sonographic abnormality. LEFT LOWER EXTREMITY ARTERIAL ASSESSMENT: External iliac artery 96/20 cm/s Common femoral artery: 74 cm/s Profunda femoris artery - prox: 41/7 cm/s SFA (proximal): 57 cm/s SFA (mid): 76 cm/s SFA (distal): 42 cm/s Popliteal artery - prox: 32/8 cm/s Popliteal artery - dist: 30/5 cm/s Posterior tibial artery - ankle: 55 cm/s Anterior tibial artery - ankle: 41/8 cm/s Dorsalis pedis artery - ankle: 34/6 cm/s Diffuse vascular calcification. There is brisk biphasic flow in the external iliac, common femoral, profunda femoral, and throughout the SFA and popliteal. Distal posterior tibial waveform is brisk and biphasic. Dorsalis pedis and distal anterior tibial are monophasic but brisk.     IMPRESSION: 1.  No sonographic evidence of inflow disease. 2.  Right lower extremity: Abnormal mid-distal SFA and popliteal waveforms with  mild mid SFA velocity elevation, favoring diffuse femoral-popliteal disease. Severe disease noted in the posterior tibial artery. Brisker monophasic flow in the anterior tibial/dorsal pedal runoff. 3.  Left lower extremity: Brisk biphasic flow through the popliteal without evidence of significant femoral-popliteal stenosis. Posterior tibial runoff is also biphasic, while there is brisk monophasic flow in the dorsalis pedis and anterior tibial.    US EVELYNE Doppler No Exercise 1-2 Levels Bilateral  Result Date: 5/22/2025  EXAM: RESTING ANKLE-BRACHIAL INDICES (ABIs) LOCATION: [facility] DATE: 5/22/2025 INDICATION: PVD, wounds, cellulitis COMPARISON: None. EVELYNE FINDINGS:  RIGHT Brachial: Ankle (PT): 79 Index: 0.68 Ankle (DP): 24 Index: 0.21 LEFT Brachial: 117 Ankle (PT): 19 Index: 0.16 Ankle (DP): 160 Index: 1.37 The right EVELYNE at rest is 0.68. The left EVELYNE at rest is 1.37.  WAVEFORMS: Monophasic bilaterally.     IMPRESSION: 1.  RIGHT LOWER EXTREMITY: EVELYNE is 0.68, in keeping with moderate resting arterial insufficiency. Waveform at the ankle is monophasic. 2.  LEFT LOWER EXTREMITY: EVELYNE of 1.37 with monophasic waveform at the ankle, consistent with spurious recording secondary to vessel noncompressibility.        XR Tibia and Fibula Right 2 Views  Result Date: 5/21/2025  EXAM: XR FEMUR RIGHT 2 VIEWS, XR TIBIA AND FIBULA RIGHT 2 VIEWS LOCATION: [facility] DATE: 5/21/2025 INDICATION: R thigh pain, infection COMPARISON: None.     IMPRESSION: No osseous erosive or destructive change of the right femur, tibia, or fibula to suggest osteomyelitis radiographically. Diffuse osseous demineralization. Mild degenerative arthritis right hip and knee. Arterial calcifications. Surgical clips  along the medial aspect of the right lower extremity. No soft tissue gas. No knee joint effusion.    XR Femur Right 2 Views  Result Date: 5/21/2025  EXAM: XR FEMUR RIGHT 2 VIEWS, XR TIBIA AND FIBULA RIGHT 2  VIEWS LOCATION: [facility] DATE: 5/21/2025 INDICATION: R thigh pain, infection COMPARISON: None.     IMPRESSION: No osseous erosive or destructive change of the right femur, tibia, or fibula to suggest osteomyelitis radiographically. Diffuse osseous demineralization. Mild degenerative arthritis right hip and knee. Arterial calcifications. Surgical clips  along the medial aspect of the right lower extremity. No soft tissue gas. No knee joint effusion.          Data reviewed today: I reviewed all medications, new labs and imaging results over the last 24 hours. I personally reviewed no images or EKG's today.  The patient's care was discussed with the Bedside Nurse and Patient.

## 2025-05-24 NOTE — PLAN OF CARE
Goal Outcome Evaluation:      Plan of Care Reviewed With: patient    Overall Patient Progress: improvingOverall Patient Progress: improving     Pt is A&Ox4, forgetful at times. VSS on RA. Complained of pain once once, PRN tylenol given. Wound care done. Insulin coverage given once before dinner. Had 1 BM this shift. He can make his needs known, call light within reach.

## 2025-05-24 NOTE — PLAN OF CARE
Goal Outcome Evaluation:                          A&Ox4 but forgetful. Wound care done on BLE. Blood sugars were 115 and 112. Legs are elevated on pillows per podiatry order. Pt refused to get out of bed today, also refused to sit in chair for meals. PRN Tylenol given x1 for 4/10 BLE pain with good effect. Red streaks still present on right thigh but appear to be improving.

## 2025-05-24 NOTE — PROGRESS NOTES
[facility]    Medicine Progress Note - Hospitalist Service    Date of Admission:  5/21/2025    Assessment & Plan                Ever Crane is a 80 year old male with PAD, A. Fib, HFpEF, DM2, CKD3, dementia and BPH admitted on 5/21/2025 with RLE cellulitis. Hospital Day: 4       #Right lower extremity cellulitis, lymphangitis  -Right lower extremity x-rays without osseous erosive or destructive changes to suggest osteomyelitis, no soft tissue gas  -CRP inflammation 131.5, ESR 54  -linear erythema extending proximally into groin, probable lymphangitis  -MRSA screen positive  - today transitioned to linezolid per ID, added fluconazole for tinea    #Bilateral lower extremity wounds, present on admission  -chronic wounds including at site of prior TMA, likely route of infection entry as above  -XR reassuring without bony changes or gas  -podiatry saw, likely no surgery needed right now (patient unwilling for surgery, even if recommended)  -podiatry recommended Prevalon boots, patient declines  - WOC following, cleansing daily with Vashe, apply Sween cream, cover with Xeroform     #PAD:    Arterial US shows EVELYNE RLE 0.68, LLE 1.37  -- home plavix and Xarelto  -- vascular planning angiogram in the near future either inpatient or outpatient      #MESHA on CKD3:  -- improved with IVF  -- ok to resume lasix and spironolactone  -- continue to hold PTA losartan  -- trend       #Chronic HFpEF  - home lasix and spironolactone  -home losartan on hold as above  -appears euvolemic      #DM2 with polyneuropathy and retinopathy without long-term current use of insulin  -home regimen: Ozempic, glimepiride  - Hold home meds  - continue Medium intensity sliding scale insulin     #Paroxysmal atrial fibrillation  - Not on rate controlling medications  - home xarelto, dose reduced to 15 mg daily per pharmacy based on GFR     #Dementia without behavioral disturbance  - Supportive cares    #Severe malnutrition  -RD  following  -Supps      #BPH, home tamsulosin  #HLD, holding home simvastatin while on fluconazole  #GERD, home PPI  #Neuropathy, home gabapentin  #Allergies, home Zyrtec          Diet: Fluid restriction 2000 ML FLUID  Moderate Consistent Carb (60 g CHO per Meal) Diet  Snacks/Supplements Adult: Glucerna; With Meals  Snacks/Supplements Adult: Expedite Cup; With Meals    DVT Prophylaxis: Moderate risk.   DOAC  Dahl Catheter: Not present  Lines: None     Cardiac Monitoring: None  Code Status: Full Code      Clinically Significant Risk Factors                   # Hypertension: Noted on problem list     # Dementia: noted on problem list       # DMII: A1C = 6.6 % (Ref range: <5.7 %) within past 6 months, PRESENT ON ADMISSION   # Severe Malnutrition: based on nutrition assessment and treatment provided per dietitian's recommendations., PRESENT ON ADMISSION   # Financial/Environmental Concerns: (P) none   # History of CABG: noted on surgical history       Disposition Plan     Medically Ready for Discharge: Anticipated Tomorrow         Discharge barrier(s): monitor progress with transition to PO abx  Care discussed with: patient      Tabatha Beltran MD  Hospitalist Service  [facility]  Securely message with [employer] (more info)  Text page via [facility] Paging/Directory   ______________________________________________________________________      Physical Exam   Vital Signs: Temp: 97.7  F (36.5  C) Temp src: Oral BP: 124/58 Pulse: 69   Resp: 20 SpO2: 94 % O2 Device: None (Room air)    Weight: 169 lbs 8.54 oz    General: in no apparent distress, non-toxic, and alert male lying in hospital bed oriented x3. Pleasant, appreciative of care, a bit tangential  HEENT: Head normocephalic atraumatic, oral mucosa moist. Sclerae anicteric  CV: Regular rhythm, normal rate, no murmurs  Resp: No wheezes, no rales or rhonchi, no focal consolidations  GI: Belly soft, nondistended, nontender, bowel sounds present  Skin:  extensive changes of stasis dermatitis BLE, erythema of R knee with faint red streaks extending proximally into the groin  Extremities: Woody edema bilateral lower legs. R TMA  Psych: Normal affect, mood euthymic  Neuro: Grossly normal      Medical Decision Making               Data   Recent Results (from the past 16 hours)   Glucose by meter    Collection Time: 05/23/25  8:46 PM   Result Value Ref Range    GLUCOSE BY METER POCT 157 (H) 70 - 99 mg/dL   Glucose by meter    Collection Time: 05/24/25  4:26 AM   Result Value Ref Range    GLUCOSE BY METER POCT 152 (H) 70 - 99 mg/dL   Vancomycin level    Collection Time: 05/24/25  7:28 AM   Result Value Ref Range    Vancomycin 15.1   ug/mL   Basic metabolic panel    Collection Time: 05/24/25  7:28 AM   Result Value Ref Range    Sodium 139 135 - 145 mmol/L    Potassium 4.4 3.4 - 5.3 mmol/L    Chloride 105 98 - 107 mmol/L    Carbon Dioxide (CO2) 23 22 - 29 mmol/L    Anion Gap 11 7 - 15 mmol/L    Urea Nitrogen 23.6 (H) 8.0 - 23.0 mg/dL    Creatinine 1.13 0.67 - 1.17 mg/dL    GFR Estimate 66 >60 mL/min/1.73m2    Calcium 9.4 8.8 - 10.4 mg/dL    Glucose 136 (H) 70 - 99 mg/dL   Extra Purple Top EDTA (LAB USE ONLY)    Collection Time: 05/24/25  7:28 AM   Result Value Ref Range    Hold Specimen JIC    Glucose by meter    Collection Time: 05/24/25  8:22 AM   Result Value Ref Range    GLUCOSE BY METER POCT 115 (H) 70 - 99 mg/dL       Interval History   Patient states doing fine.  States he is feeling better.  He is worried about red stripes coming up his right thigh still.  Complains has not been eating well, dislikes the food they serve at his assisted living.

## 2025-05-24 NOTE — PLAN OF CARE
Problem: Comorbidity Management  Goal: Maintenance of Behavioral Health Symptom Control  Outcome: Progressing     Problem: Comorbidity Management  Goal: Blood Glucose Levels Within Targeted Range  Outcome: Progressing     Problem: Comorbidity Management  Goal: Blood Pressure in Desired Range  Outcome: Progressing     Problem: Skin or Soft Tissue Infection  Goal: Absence of Infection Signs and Symptoms  Outcome: Progressing   Goal Outcome Evaluation:    Patient slept between cares. Alert and oriented x4 with some forgetfulness. Denies pain. Edmund lower extremity dressing intact. On iv antibiotics for RLE cellulitis. Up with assist of 1.

## 2025-05-25 ENCOUNTER — APPOINTMENT (OUTPATIENT)
Dept: MRI IMAGING | Facility: HOSPITAL | Age: 80
End: 2025-05-25
Attending: PODIATRIST
Payer: MEDICARE

## 2025-05-25 LAB
ANION GAP SERPL CALCULATED.3IONS-SCNC: 10 MMOL/L (ref 7–15)
BACTERIA SPEC CULT: ABNORMAL
BUN SERPL-MCNC: 23.8 MG/DL (ref 8–23)
CALCIUM SERPL-MCNC: 9.3 MG/DL (ref 8.8–10.4)
CHLORIDE SERPL-SCNC: 104 MMOL/L (ref 98–107)
CREAT SERPL-MCNC: 1.24 MG/DL (ref 0.67–1.17)
CRP SERPL-MCNC: 25.7 MG/L
EGFRCR SERPLBLD CKD-EPI 2021: 59 ML/MIN/1.73M2
ERYTHROCYTE [DISTWIDTH] IN BLOOD BY AUTOMATED COUNT: 13.6 % (ref 10–15)
GLUCOSE BLDC GLUCOMTR-MCNC: 113 MG/DL (ref 70–99)
GLUCOSE BLDC GLUCOMTR-MCNC: 119 MG/DL (ref 70–99)
GLUCOSE BLDC GLUCOMTR-MCNC: 125 MG/DL (ref 70–99)
GLUCOSE BLDC GLUCOMTR-MCNC: 157 MG/DL (ref 70–99)
GLUCOSE BLDC GLUCOMTR-MCNC: 196 MG/DL (ref 70–99)
GLUCOSE SERPL-MCNC: 138 MG/DL (ref 70–99)
HCO3 SERPL-SCNC: 25 MMOL/L (ref 22–29)
HCT VFR BLD AUTO: 33.5 % (ref 40–53)
HGB BLD-MCNC: 10.9 G/DL (ref 13.3–17.7)
MCH RBC QN AUTO: 27.5 PG (ref 26.5–33)
MCHC RBC AUTO-ENTMCNC: 32.5 G/DL (ref 31.5–36.5)
MCV RBC AUTO: 84 FL (ref 78–100)
PLATELET # BLD AUTO: 233 10E3/UL (ref 150–450)
POTASSIUM SERPL-SCNC: 4.4 MMOL/L (ref 3.4–5.3)
RBC # BLD AUTO: 3.97 10E6/UL (ref 4.4–5.9)
SODIUM SERPL-SCNC: 139 MMOL/L (ref 135–145)
WBC # BLD AUTO: 8 10E3/UL (ref 4–11)

## 2025-05-25 PROCEDURE — 250N000013 HC RX MED GY IP 250 OP 250 PS 637: Performed by: INTERNAL MEDICINE

## 2025-05-25 PROCEDURE — 250N000013 HC RX MED GY IP 250 OP 250 PS 637: Performed by: HOSPITALIST

## 2025-05-25 PROCEDURE — 36415 COLL VENOUS BLD VENIPUNCTURE: CPT | Performed by: HOSPITALIST

## 2025-05-25 PROCEDURE — 120N000001 HC R&B MED SURG/OB

## 2025-05-25 PROCEDURE — 86140 C-REACTIVE PROTEIN: CPT | Performed by: HOSPITALIST

## 2025-05-25 PROCEDURE — 73721 MRI JNT OF LWR EXTRE W/O DYE: CPT | Mod: RT

## 2025-05-25 PROCEDURE — 82947 ASSAY GLUCOSE BLOOD QUANT: CPT | Performed by: HOSPITALIST

## 2025-05-25 PROCEDURE — 99232 SBSQ HOSP IP/OBS MODERATE 35: CPT | Performed by: PODIATRIST

## 2025-05-25 PROCEDURE — 85027 COMPLETE CBC AUTOMATED: CPT | Performed by: HOSPITALIST

## 2025-05-25 PROCEDURE — 99232 SBSQ HOSP IP/OBS MODERATE 35: CPT | Performed by: HOSPITALIST

## 2025-05-25 RX ORDER — AMOXICILLIN 250 MG
1 CAPSULE ORAL 2 TIMES DAILY
Status: DISCONTINUED | OUTPATIENT
Start: 2025-05-25 | End: 2025-05-26 | Stop reason: HOSPADM

## 2025-05-25 RX ADMIN — FUROSEMIDE 40 MG: 20 TABLET ORAL at 08:32

## 2025-05-25 RX ADMIN — SPIRONOLACTONE 25 MG: 25 TABLET, FILM COATED ORAL at 08:32

## 2025-05-25 RX ADMIN — GABAPENTIN 200 MG: 100 CAPSULE ORAL at 08:31

## 2025-05-25 RX ADMIN — SENNOSIDES AND DOCUSATE SODIUM 1 TABLET: 50; 8.6 TABLET ORAL at 21:17

## 2025-05-25 RX ADMIN — PANTOPRAZOLE SODIUM 40 MG: 40 TABLET, DELAYED RELEASE ORAL at 08:31

## 2025-05-25 RX ADMIN — GABAPENTIN 200 MG: 100 CAPSULE ORAL at 21:17

## 2025-05-25 RX ADMIN — LINEZOLID 600 MG: 600 TABLET, FILM COATED ORAL at 08:32

## 2025-05-25 RX ADMIN — RIVAROXABAN 15 MG: 15 TABLET, FILM COATED ORAL at 17:05

## 2025-05-25 RX ADMIN — TAMSULOSIN HYDROCHLORIDE 0.4 MG: 0.4 CAPSULE ORAL at 08:32

## 2025-05-25 RX ADMIN — CETIRIZINE HYDROCHLORIDE 5 MG: 5 TABLET ORAL at 08:32

## 2025-05-25 RX ADMIN — ACETAMINOPHEN 650 MG: 325 TABLET ORAL at 08:42

## 2025-05-25 RX ADMIN — FLUCONAZOLE 150 MG: 150 TABLET ORAL at 08:32

## 2025-05-25 RX ADMIN — CLOPIDOGREL BISULFATE 75 MG: 75 TABLET, FILM COATED ORAL at 08:32

## 2025-05-25 RX ADMIN — LINEZOLID 600 MG: 600 TABLET, FILM COATED ORAL at 21:17

## 2025-05-25 ASSESSMENT — ACTIVITIES OF DAILY LIVING (ADL)
ADLS_ACUITY_SCORE: 46
ADLS_ACUITY_SCORE: 49
ADLS_ACUITY_SCORE: 46
ADLS_ACUITY_SCORE: 46
ADLS_ACUITY_SCORE: 49
ADLS_ACUITY_SCORE: 46
ADLS_ACUITY_SCORE: 49
ADLS_ACUITY_SCORE: 46
ADLS_ACUITY_SCORE: 49
ADLS_ACUITY_SCORE: 46
ADLS_ACUITY_SCORE: 46
ADLS_ACUITY_SCORE: 49
ADLS_ACUITY_SCORE: 46
ADLS_ACUITY_SCORE: 49
ADLS_ACUITY_SCORE: 49

## 2025-05-25 NOTE — PLAN OF CARE
Goal Outcome Evaluation:      Plan of Care Reviewed With: patient    Overall Patient Progress: improvingOverall Patient Progress: improving     Pt is A&Ox4. VSS on RA. No pain noted within this shift. Insulin coverage given once before dinner. Legs elevated on pillows. Pt can make his needs known. Call light within reach.

## 2025-05-25 NOTE — PROGRESS NOTES
Podiatry / Foot and Ankle Surgery Progress Note    May 25, 2025    Subject: Patient was seen at bedside.  He is laying in bed, eating breakfast, watching tv.  Relates that he feels well this morning    Objective:  Vitals: /55 (BP Location: Right arm)   Pulse 68   Temp 97.7  F (36.5  C) (Oral)   Resp 16   Ht 1.829 m (6')   Wt 73.8 kg (162 lb 11.2 oz)   SpO2 92%   BMI 22.07 kg/m    BMI= Body mass index is 22.07 kg/m .    General:  Patient is alert and orientated.  NAD.    Dressings are intact bilateral lower extremity    Imaging: I personally reviewed images.     EXAM: XR FOOT LEFT G/E 3 VIEWS  LOCATION: [facility]  DATE: 5/24/2025     INDICATION: history of TMA, chronic wounds  COMPARISON: Radiographs 05/24/2025.                                                                      IMPRESSION:      Redemonstrated transmetatarsal amputation. No definite erosive/destructive changes to suggest acute osteomyelitis, however MRI is more sensitive if clinically indicated. No soft tissue gas. No acute fracture. Mild scattered degenerative arthritis.   Plantar calcaneal spur.      Narrative & Impression   EXAM: XR FOOT RIGHT 2 VIEWS  LOCATION: [facility]  DATE: 5/24/2025     INDICATION: history of TMA and heel debridement, chronic wounds at distal TMA site and heel.  ? osteomyelitis or deeper infection  COMPARISON: 10/31/2017                                                                      IMPRESSION: Transmetatarsal amputation. The amputation margins are well-corticated without osseous erosive or destructive change. No osseous erosive or destructive change of the calcaneus. No soft tissue gas. Mild scattered degenerative arthritis.   Plantar calcaneal spur.       ASSESSMENT: 80-year-old man with a history of heart failure, A-fib, peripheral vascular disease, hypertension, diabetes, coronary artery disease, hyperlipidemia h/o TMA left foot and TMA right and  partial calcanectomy right, now also with RLE cellulitis with chronic wound and critical limb threatening ischemia,      PLAN:  Reviewed patient's chart in epic.  Antibiotics - Linezolid/fluconazole per Infectious Disease  WO nursing consult - appreciate cares and recommendations   Vascular Surgery with tentative plan for angiogram in the coming weeks either inpatient or outpatient pending on length of patient stay in hospital  Xray bilateral foot - no obvious osteomyelitis though MRI is suggested  Will order bilateral MRI - pending  Discussed with patient that surgical intervention should be considered if there is any evidence of bone infection - patient states he will not have any further foot surgery, or amputation  Patient is willing to have a pillow placed behind the calf to help offload the heels, will not wear heel offloading boots - discussed with nursing staff  Will follow up on MRI        Juju Roberts DPM  9:15 AM

## 2025-05-25 NOTE — PLAN OF CARE
Problem: Comorbidity Management  Goal: Maintenance of Behavioral Health Symptom Control  Outcome: Progressing     Problem: Comorbidity Management  Goal: Blood Glucose Levels Within Targeted Range  Outcome: Progressing     Problem: Comorbidity Management  Goal: Blood Pressure in Desired Range  Outcome: Progressing     Problem: Skin or Soft Tissue Infection  Goal: Absence of Infection Signs and Symptoms  Outcome: Progressing   Goal Outcome Evaluation:    Patient is oriented x4. VSS on room air. Denied pain. Bilateral lower legs dressing clean,dry, intact. Got up to the bathroom with assist of 1 with a walker. On oral antibiotics for RLE cellulitis.

## 2025-05-25 NOTE — PROGRESS NOTES
[facility]    Medicine Progress Note - Hospitalist Service    Date of Admission:  5/21/2025    Assessment & Plan                Ever Crane is a 80 year old male with PAD, A. Fib, HFpEF, DM2, CKD3, dementia and BPH admitted on 5/21/2025 with RLE cellulitis. Hospital Day: 5       #Right lower extremity cellulitis, lymphangitis  -Right lower extremity x-rays without osseous erosive or destructive changes to suggest osteomyelitis, no soft tissue gas  -CRP inflammation 131.5, ESR 54  -linear erythema extending proximally into groin, probable lymphangitis  -MRSA screen positive  - on linezolid per ID, fluconazole for tinea    #Bilateral lower extremity wounds, present on admission  -chronic wounds including at site of prior TMA, likely route of infection entry as above  -XR reassuring without bony changes or gas  -podiatry saw, likely no surgery needed right now (patient unwilling for surgery, even if recommended)  -MRIs of both feet ordered by podiatry  -podiatry recommended Prevalon boots, patient declines  - WOC following, cleansing daily with Vashe, apply Sween cream, cover with Xeroform  -apparently only getting wound care 1x per week at home- recommend daily     #PAD:    Arterial US shows EVELYNE RLE 0.68, LLE 1.37  -- home plavix and Xarelto  -- vascular planning angiogram in the near future either inpatient or outpatient      #MESHA on CKD3:  -- improved with IVF  -- home lasix and spironolactone  -- continue to hold PTA losartan  -- trend       #Chronic HFpEF  - home lasix and spironolactone  -home losartan on hold as above  -appears euvolemic      #DM2 with polyneuropathy and retinopathy without long-term current use of insulin  -home regimen: Ozempic, glimepiride  - Hold home meds  - continue Medium intensity sliding scale insulin     #Paroxysmal atrial fibrillation  - Not on rate controlling medications  - home xarelto, dose reduced to 15 mg daily per pharmacy based on GFR     #Dementia  without behavioral disturbance  - Supportive cares    #Severe malnutrition  -RD following  -Supps      #BPH, home tamsulosin  #HLD, holding home simvastatin while on fluconazole  #GERD, home PPI  #Neuropathy, home gabapentin  #Allergies, home Zyrtec          Diet: Fluid restriction 2000 ML FLUID  Moderate Consistent Carb (60 g CHO per Meal) Diet  Snacks/Supplements Adult: Glucerna; With Meals  Snacks/Supplements Adult: Expedite Cup; With Meals    DVT Prophylaxis: Moderate risk.   DOAC  Dahl Catheter: Not present  Lines: None     Cardiac Monitoring: None  Code Status: Full Code      Clinically Significant Risk Factors                   # Hypertension: Noted on problem list     # Dementia: noted on problem list       # DMII: A1C = 6.6 % (Ref range: <5.7 %) within past 6 months, PRESENT ON ADMISSION   # Severe Malnutrition: based on nutrition assessment and treatment provided per dietitian's recommendations., PRESENT ON ADMISSION     # Financial/Environmental Concerns: (P) none   # History of CABG: noted on surgical history       Disposition Plan     Medically Ready for Discharge: Anticipated Tomorrow         Discharge barrier(s): monitor progress with transition to PO abx. Foot MRIs  Care discussed with: patient, sister      Tabatha Beltran MD  Hospitalist Service  [facility]  Securely message with [employer] (more info)  Text page via [employer] Paging/Directory   ______________________________________________________________________      Physical Exam   Vital Signs: Temp: 97.7  F (36.5  C) Temp src: Oral BP: 119/55 Pulse: 68   Resp: 16 SpO2: 92 % O2 Device: None (Room air)    Weight: 162 lbs 11.19 oz    General: in no apparent distress, non-toxic, and alert male lying in hospital bed oriented x3. Pleasant, appreciative of care, a bit tangential  HEENT: Head normocephalic atraumatic, oral mucosa moist. Sclerae anicteric  Skin: extensive changes of stasis dermatitis BLE, erythema of R knee improved from  yesterday, faint red streaks extending proximally into the groin slightly improved from yesterday  Extremities: Woody edema bilateral lower legs. R TMA  Psych: Normal affect, mood euthymic  Neuro: Grossly normal      Medical Decision Making               Data   Recent Results (from the past 16 hours)   Glucose by meter    Collection Time: 05/25/25  2:30 AM   Result Value Ref Range    GLUCOSE BY METER POCT 157 (H) 70 - 99 mg/dL   CBC with platelets    Collection Time: 05/25/25  6:41 AM   Result Value Ref Range    WBC Count 8.0 4.0 - 11.0 10e3/uL    RBC Count 3.97 (L) 4.40 - 5.90 10e6/uL    Hemoglobin 10.9 (L) 13.3 - 17.7 g/dL    Hematocrit 33.5 (L) 40.0 - 53.0 %    MCV 84 78 - 100 fL    MCH 27.5 26.5 - 33.0 pg    MCHC 32.5 31.5 - 36.5 g/dL    RDW 13.6 10.0 - 15.0 %    Platelet Count 233 150 - 450 10e3/uL   Basic metabolic panel    Collection Time: 05/25/25  6:41 AM   Result Value Ref Range    Sodium 139 135 - 145 mmol/L    Potassium 4.4 3.4 - 5.3 mmol/L    Chloride 104 98 - 107 mmol/L    Carbon Dioxide (CO2) 25 22 - 29 mmol/L    Anion Gap 10 7 - 15 mmol/L    Urea Nitrogen 23.8 (H) 8.0 - 23.0 mg/dL    Creatinine 1.24 (H) 0.67 - 1.17 mg/dL    GFR Estimate 59 (L) >60 mL/min/1.73m2    Calcium 9.3 8.8 - 10.4 mg/dL    Glucose 138 (H) 70 - 99 mg/dL   CRP inflammation    Collection Time: 05/25/25  6:41 AM   Result Value Ref Range    CRP Inflammation 25.70 (H) <5.00 mg/L   Glucose by meter    Collection Time: 05/25/25  8:25 AM   Result Value Ref Range    GLUCOSE BY METER POCT 125 (H) 70 - 99 mg/dL   Glucose by meter    Collection Time: 05/25/25 12:10 PM   Result Value Ref Range    GLUCOSE BY METER POCT 113 (H) 70 - 99 mg/dL       Interval History   Patient states doing fine today.  Feels his legs are slightly improved.  Still with red streaks coming up the right thigh.  He is nervous to discharge until this improves.  Nursing assistant noted he spent over 40 minutes in the bathroom today trying to have a bowel movement.   Asked patient about this, he states his normal pattern has been to have a bowel movement once a week, he is not very bothered by this but is open to trying an oral laxative.  Did not want a suppository at this time    I called and updated his sister Miriam Martin

## 2025-05-25 NOTE — PLAN OF CARE
Goal Outcome Evaluation:                          A&Ox4 but forgetful. Assist of 1 with walker, refuses gait belt. Wounds on right foot started bleeding after ambulating to and from bathroom, saturating dressing. Wound care and dressing changes done. MRI of bilateral ankle and foot ordered. PRN Tylenol given x1 for bilateral leg/foot pain with good effect.

## 2025-05-26 VITALS
RESPIRATION RATE: 18 BRPM | DIASTOLIC BLOOD PRESSURE: 67 MMHG | TEMPERATURE: 97.6 F | HEIGHT: 72 IN | HEART RATE: 74 BPM | BODY MASS INDEX: 23.66 KG/M2 | WEIGHT: 174.7 LBS | OXYGEN SATURATION: 95 % | SYSTOLIC BLOOD PRESSURE: 133 MMHG

## 2025-05-26 LAB
ANION GAP SERPL CALCULATED.3IONS-SCNC: 11 MMOL/L (ref 7–15)
BUN SERPL-MCNC: 22.5 MG/DL (ref 8–23)
CALCIUM SERPL-MCNC: 9.7 MG/DL (ref 8.8–10.4)
CHLORIDE SERPL-SCNC: 103 MMOL/L (ref 98–107)
CREAT SERPL-MCNC: 1.21 MG/DL (ref 0.67–1.17)
EGFRCR SERPLBLD CKD-EPI 2021: 61 ML/MIN/1.73M2
ERYTHROCYTE [DISTWIDTH] IN BLOOD BY AUTOMATED COUNT: 13.7 % (ref 10–15)
GLUCOSE BLDC GLUCOMTR-MCNC: 122 MG/DL (ref 70–99)
GLUCOSE BLDC GLUCOMTR-MCNC: 125 MG/DL (ref 70–99)
GLUCOSE SERPL-MCNC: 120 MG/DL (ref 70–99)
HCO3 SERPL-SCNC: 25 MMOL/L (ref 22–29)
HCT VFR BLD AUTO: 34.9 % (ref 40–53)
HGB BLD-MCNC: 11.5 G/DL (ref 13.3–17.7)
MCH RBC QN AUTO: 27.6 PG (ref 26.5–33)
MCHC RBC AUTO-ENTMCNC: 33 G/DL (ref 31.5–36.5)
MCV RBC AUTO: 84 FL (ref 78–100)
PLATELET # BLD AUTO: 222 10E3/UL (ref 150–450)
POTASSIUM SERPL-SCNC: 4.5 MMOL/L (ref 3.4–5.3)
RBC # BLD AUTO: 4.16 10E6/UL (ref 4.4–5.9)
SODIUM SERPL-SCNC: 139 MMOL/L (ref 135–145)
WBC # BLD AUTO: 7.6 10E3/UL (ref 4–11)

## 2025-05-26 PROCEDURE — 80048 BASIC METABOLIC PNL TOTAL CA: CPT | Performed by: HOSPITALIST

## 2025-05-26 PROCEDURE — 250N000013 HC RX MED GY IP 250 OP 250 PS 637: Performed by: INTERNAL MEDICINE

## 2025-05-26 PROCEDURE — 250N000013 HC RX MED GY IP 250 OP 250 PS 637: Performed by: HOSPITALIST

## 2025-05-26 PROCEDURE — 85027 COMPLETE CBC AUTOMATED: CPT | Performed by: HOSPITALIST

## 2025-05-26 PROCEDURE — 99239 HOSP IP/OBS DSCHRG MGMT >30: CPT | Performed by: HOSPITALIST

## 2025-05-26 PROCEDURE — 36415 COLL VENOUS BLD VENIPUNCTURE: CPT | Performed by: HOSPITALIST

## 2025-05-26 PROCEDURE — 99231 SBSQ HOSP IP/OBS SF/LOW 25: CPT | Performed by: PODIATRIST

## 2025-05-26 RX ORDER — PANTOPRAZOLE SODIUM 40 MG/1
40 TABLET, DELAYED RELEASE ORAL DAILY
Qty: 30 TABLET | Refills: 0 | Status: SHIPPED | OUTPATIENT
Start: 2025-05-26

## 2025-05-26 RX ORDER — SODIUM CHLOR/HYPOCHLOROUS ACID 0.033 %
1 SOLUTION, IRRIGATION IRRIGATION DAILY
Qty: 1000 ML | Refills: 0 | Status: SHIPPED | OUTPATIENT
Start: 2025-05-26

## 2025-05-26 RX ORDER — FLUCONAZOLE 150 MG/1
150 TABLET ORAL DAILY
Qty: 4 TABLET | Refills: 0 | Status: SHIPPED | OUTPATIENT
Start: 2025-05-26 | End: 2025-05-30

## 2025-05-26 RX ORDER — BISMUTH TRIBROMOPH/PETROLATUM 5"X9"
5 BANDAGE TOPICAL DAILY
Qty: 150 EACH | Refills: 0 | Status: SHIPPED | OUTPATIENT
Start: 2025-05-26

## 2025-05-26 RX ORDER — LINEZOLID 600 MG/1
600 TABLET, FILM COATED ORAL EVERY 12 HOURS
Qty: 10 TABLET | Refills: 0 | Status: SHIPPED | OUTPATIENT
Start: 2025-05-26 | End: 2025-05-31

## 2025-05-26 RX ADMIN — CLOPIDOGREL BISULFATE 75 MG: 75 TABLET, FILM COATED ORAL at 09:48

## 2025-05-26 RX ADMIN — ACETAMINOPHEN 650 MG: 325 TABLET ORAL at 09:56

## 2025-05-26 RX ADMIN — PANTOPRAZOLE SODIUM 40 MG: 40 TABLET, DELAYED RELEASE ORAL at 09:48

## 2025-05-26 RX ADMIN — LINEZOLID 600 MG: 600 TABLET, FILM COATED ORAL at 10:32

## 2025-05-26 RX ADMIN — SPIRONOLACTONE 25 MG: 25 TABLET, FILM COATED ORAL at 09:48

## 2025-05-26 RX ADMIN — ACETAMINOPHEN 650 MG: 325 TABLET ORAL at 00:19

## 2025-05-26 RX ADMIN — CETIRIZINE HYDROCHLORIDE 5 MG: 5 TABLET ORAL at 09:48

## 2025-05-26 RX ADMIN — TAMSULOSIN HYDROCHLORIDE 0.4 MG: 0.4 CAPSULE ORAL at 09:49

## 2025-05-26 RX ADMIN — FLUCONAZOLE 150 MG: 150 TABLET ORAL at 10:32

## 2025-05-26 RX ADMIN — FUROSEMIDE 40 MG: 20 TABLET ORAL at 09:48

## 2025-05-26 RX ADMIN — GABAPENTIN 200 MG: 100 CAPSULE ORAL at 09:49

## 2025-05-26 RX ADMIN — SENNOSIDES AND DOCUSATE SODIUM 1 TABLET: 50; 8.6 TABLET ORAL at 09:49

## 2025-05-26 ASSESSMENT — ACTIVITIES OF DAILY LIVING (ADL)
ADLS_ACUITY_SCORE: 49
ADLS_ACUITY_SCORE: 47
ADLS_ACUITY_SCORE: 49
ADLS_ACUITY_SCORE: 46
ADLS_ACUITY_SCORE: 49
ADLS_ACUITY_SCORE: 47
ADLS_ACUITY_SCORE: 49

## 2025-05-26 NOTE — PROGRESS NOTES
Care Management Discharge Note    Discharge Date: 05/26/2025       Discharge Disposition: Skilled Nursing Facility, Long Term Care    Discharge Services: None    Discharge DME: None    Discharge Transportation: family or friend will provide    Private pay costs discussed: Not applicable    Does the patient's insurance plan have a 3 day qualifying hospital stay waiver?  No    PAS Confirmation Code: not needed, returning to skilled facility  Patient/family educated on Medicare website which has current facility and service quality ratings: yes    Education Provided on the Discharge Plan: Yes  Persons Notified of Discharge Plans: pt, pts son, Charles, RN, MATHEW, charge, facility   Patient/Family in Agreement with the Plan: yes    Handoff Referral Completed: No, handoff not indicated or clinically appropriate    Additional Information:  LINDEN confirmed with MD that pt medically ready for discharge today. LINDEN spoke with Roula from [facility]. She confirms they can accept the pt back today at [facility], requests orders as soon as possible but reports pt can arrive anytime before 6:30 PM. She reports [facility] as [facility] in pts chart. LINDEN sent orders to [facility] through [facility]. LINDEN confirmed with pts RN that pt okay to transport with his son, Charles. Pt present while LINDEN updating RN and he is in agreement with this as well. LINDEN spoke with Charles who reports that he can transport at 1:00 today. LINDEN updated Roula at [facility], tony, MATHEW, RN and pt.    Anne Anne [facility]

## 2025-05-26 NOTE — PLAN OF CARE
Patient to discharge back to [facility] today. 2 Peripheral IVs removed. Dressing change completed. Administered one senna this am, denies any concerns with voiding. Tolerating diabetic diet. Phantom pain in toes, PRN acetaminophen

## 2025-05-26 NOTE — PLAN OF CARE
Goal Outcome Evaluation:      Plan of Care Reviewed With: patient    Overall Patient Progress: improvingOverall Patient Progress: improving     Pt is A&Ox4. VSS on RA. Denies pain. MRI for both foot and ankles are done with results. Wound care done after MRI. Anticipated to be discharge tomorrow.

## 2025-05-26 NOTE — DISCHARGE SUMMARY
[facility] MEDICINE  DISCHARGE SUMMARY     Primary Care Physician: Sariah Harper  Admission Date: 5/21/2025   Discharge Provider: Tabatha Beltran MD Discharge Date: 5/26/2025   Diet:   Active Diet and Nourishment Order   Procedures    Fluid restriction 2000 ML FLUID    Snacks/Supplements Adult: Glucerna; With Meals    Snacks/Supplements Adult: Expedite Cup; With Meals    Moderate Consistent Carb (60 g CHO per Meal) Diet    Diet       Code Status: Full Code   Activity: DCACTIVITY: Activity as tolerated        Condition at Discharge: Stable     REASON FOR PRESENTATION(See Admission Note for Details)   R leg red streaks    PRINCIPAL & ACTIVE DISCHARGE DIAGNOSES     Principal Problem:    Cellulitis of right lower extremity  Active Problems:    Essential hypertension    Stage 3b chronic kidney disease (H)    Heart failure with preserved ejection fraction, NYHA class II (H)    Persistent atrial fibrillation (H)    Type 2 diabetes mellitus with other circulatory complication, with long-term current use of insulin (H)    Acute kidney injury superimposed on CKD    Chronic obstructive pulmonary disease, unspecified (H)    Dementia, unspecified dementia severity, unspecified dementia type, unspecified whether behavioral, psychotic, or mood disturbance or anxiety (H)      PENDING LABS     Unresulted Labs Ordered in the Past 30 Days of this Admission       Date and Time Order Name Status Description    5/21/2025  9:06 PM Blood Culture Peripheral blood (BC) Arm, Right Preliminary     5/21/2025  9:06 PM Blood Culture Peripheral blood (BC) Arm, Right Preliminary             PROCEDURES ( this hospitalization only)      none    RECOMMENDATIONS TO OUTPATIENT PROVIDER FOR F/U VISIT     Follow-up Appointments       Follow Up and recommended labs and tests      Follow up with group home physician.  The following labs/tests are recommended: BMP and CBC 1 week.  Follow up with Vascular in the next 1-2  weeks for angiogram.                DISPOSITION     Skilled Nursing Facility (LTC)    SUMMARY OF HOSPITAL COURSE:      Ever Crane is a 80 year old male with PAD, A. Fib, HFpEF, DM2, CKD3, dementia and BPH admitted on 5/21/2025 with RLE cellulitis. Hospital Day: 6        #Right lower extremity cellulitis, lymphangitis  -Right lower extremity x-rays without osseous changes to suggest osteomyelitis, no soft tissue gas  -CRP inflammation 131.5, ESR 54  -linear erythema extending proximally into groin, probable lymphangitis  -MRSA screen positive  - on linezolid per ID, fluconazole for tinea  -exam is much improved  -needs PAD addressed as well as better wound care to prevent recurrence     #Bilateral lower extremity wounds, present on admission  -chronic wounds including at site of prior TMA, likely route of infection entry as above  -MRIs of both feet without osteomyelitis  -podiatry saw, no surgery needed right now (patient unwilling for surgery, even if recommended)  -podiatry recommended Prevalon boots, patient declines. Offload heels as able  - WOC following, cleansing daily with Vashe, apply eucerin cream, cover with Xeroform  -apparently only getting wound care a few times a week at home- recommend daily     #PAD:    Arterial US shows EVELYNE RLE 0.68, LLE 1.37  -- home plavix and Xarelto (Xarelto dose decreased gien borderline renal function with ARB resumed at time of discharge and he is also on Plavix)  -- vascular planning angiogram in the near future as outpatient      #MESHA on CKD3:  -- improved with IVF  -- home lasix and spironolactone  -- can resume losartan at discharge  -BMP 1 week     #DM2 with polyneuropathy and retinopathy without long-term current use of insulin  -home regimen: Ozempic, glimepiride  - severely malnourished due to nothing tastes good to him; with A1c of 6.6, do not think Ozempic is worth the side effect profile for him any longer. Discontinue Ozempic  -continue home glimepiride    "  #Paroxysmal atrial fibrillation  - Not on rate controlling medications  - home xarelto, dose reduced as above     #Severe malnutrition  -RD saw  -Supps      #HLD, holding home simvastatin while on fluconazole    #GERD, home PPI changed from omeprazole to pantoprazole due to interaction with his Plavix      Discharge Medications with Med changes:     Current Discharge Medication List        START taking these medications    Details   Bismuth Tribromoph-Petrolatum (XEROFORM PETROLAT GAUZE 5\"X9\") MISC Externally apply 5 each topically daily.  Qty: 150 each, Refills: 0    Associated Diagnoses: Cellulitis of right lower extremity; Venous stasis dermatitis      fluconazole (DIFLUCAN) 150 MG tablet Take 1 tablet (150 mg) by mouth daily for 4 days.  Qty: 4 tablet, Refills: 0    Associated Diagnoses: Cellulitis of right lower extremity      linezolid (ZYVOX) 600 MG tablet Take 1 tablet (600 mg) by mouth every 12 hours for 5 days.  Qty: 10 tablet, Refills: 0    Associated Diagnoses: Cellulitis of right lower extremity      pantoprazole (PROTONIX) 40 MG EC tablet Take 1 tablet (40 mg) by mouth daily.  Qty: 30 tablet, Refills: 0    Associated Diagnoses: Mcgarry's esophagus without dysplasia      Wound Cleansers (VASHE WOUND) 0.033 % SOLN Externally apply 1 Application topically daily.  Qty: 1000 mL, Refills: 0    Associated Diagnoses: Cellulitis of right lower extremity; Venous stasis dermatitis           CONTINUE these medications which have CHANGED    Details   rivaroxaban ANTICOAGULANT (XARELTO) 15 MG TABS tablet Take 1 tablet (15 mg) by mouth daily.  Qty: 30 tablet, Refills: 0    Associated Diagnoses: Persistent atrial fibrillation (H)           CONTINUE these medications which have NOT CHANGED    Details   acetaminophen (TYLENOL) 325 MG tablet Take 650 mg by mouth 2 times daily as needed for mild pain      albuterol (PROAIR HFA/PROVENTIL HFA/VENTOLIN HFA) 108 (90 Base) MCG/ACT inhaler Inhale 2 puffs into the lungs 2 " times daily as needed      cetirizine (ZYRTEC) 5 MG tablet Take 5 mg by mouth daily      clopidogrel (PLAVIX) 75 MG tablet Take 75 mg by mouth daily      clotrimazole-betamethasone (LOTRISONE) 1-0.05 % external cream Apply topically 2 times daily    Associated Diagnoses: Cellulitis of left lower leg; Non-pressure chronic ulcer left lower leg, limited to breakdown skin (H)      furosemide (LASIX) 40 MG tablet Take 40 mg by mouth daily      gabapentin (NEURONTIN) 100 MG capsule Take 200 mg by mouth 2 times daily      glimepiride (AMARYL) 1 MG tablet Take 1 mg by mouth every morning (before breakfast)      hypromellose (ARTIFICIAL TEARS) 0.5 % SOLN ophthalmic solution Place 1 drop Into the left eye 4 times daily as needed for dry eyes      ketoconazole (NIZORAL) 2 % external shampoo Apply topically twice a week Mon and Fri.      losartan (COZAAR) 25 MG tablet Take 12.5 mg by mouth daily      simvastatin (ZOCOR) 40 MG tablet Take 40 mg by mouth At Bedtime      spironolactone (ALDACTONE) 25 MG tablet Take 25 mg by mouth daily      tamsulosin (FLOMAX) 0.4 MG capsule Take 0.4 mg by mouth daily      naloxone (NARCAN) 0.4 MG/ML injection Inject 0.1-0.4 mg into the muscle once as needed for opioid reversal           STOP taking these medications       omeprazole (PRILOSEC) 20 MG CR capsule Comments:   Reason for Stopping:         Semaglutide, 1 MG/DOSE, (OZEMPIC) 4 MG/3ML pen Comments:   Reason for Stopping:                 Consults     PHARMACY TO DOSE VANCO  PHARMACY TO DOSE VANCO  WOUND OSTOMY CONTINENCE NURSE  IP CONSULT  CARE MANAGEMENT / SOCIAL WORK IP CONSULT  INFECTIOUS DISEASES IP CONSULT  VASCULAR SURGERY IP CONSULT  PODIATRY IP CONSULT  PHYSICAL THERAPY ADULT IP CONSULT  OCCUPATIONAL THERAPY ADULT IP CONSULT         SIGNIFICANT IMAGING FINDINGS     Results for orders placed or performed during the hospital encounter of 05/21/25   XR Tibia and Fibula Right 2 Views    Impression    IMPRESSION: No osseous erosive or  destructive change of the right femur, tibia, or fibula to suggest osteomyelitis radiographically. Diffuse osseous demineralization. Mild degenerative arthritis right hip and knee. Arterial calcifications. Surgical clips   along the medial aspect of the right lower extremity. No soft tissue gas. No knee joint effusion.   XR Femur Right 2 Views    Impression    IMPRESSION: No osseous erosive or destructive change of the right femur, tibia, or fibula to suggest osteomyelitis radiographically. Diffuse osseous demineralization. Mild degenerative arthritis right hip and knee. Arterial calcifications. Surgical clips   along the medial aspect of the right lower extremity. No soft tissue gas. No knee joint effusion.   US EVELYNE Doppler No Exercise 1-2 Levels Bilateral    Impression    IMPRESSION:  1.  RIGHT LOWER EXTREMITY: EVELYNE is 0.68, in keeping with moderate resting arterial insufficiency. Waveform at the ankle is monophasic.  2.  LEFT LOWER EXTREMITY: EVELYNE of 1.37 with monophasic waveform at the ankle, consistent with spurious recording secondary to vessel noncompressibility.                 US Lower Extremity Arterial Duplex Bilateral    Impression    IMPRESSION:  1.  No sonographic evidence of inflow disease.    2.  Right lower extremity: Abnormal mid-distal SFA and popliteal waveforms with mild mid SFA velocity elevation, favoring diffuse femoral-popliteal disease. Severe disease noted in the posterior tibial artery. Brisker monophasic flow in the anterior   tibial/dorsal pedal runoff.    3.  Left lower extremity: Brisk biphasic flow through the popliteal without evidence of significant femoral-popliteal stenosis. Posterior tibial runoff is also biphasic, while there is brisk monophasic flow in the dorsalis pedis and anterior tibial.   XR Foot Right 2 Views    Impression    IMPRESSION: Transmetatarsal amputation. The amputation margins are well-corticated without osseous erosive or destructive change. No osseous erosive  or destructive change of the calcaneus. No soft tissue gas. Mild scattered degenerative arthritis.   Plantar calcaneal spur.   XR Foot Left G/E 3 Views    Impression    IMPRESSION:     Redemonstrated transmetatarsal amputation. No definite erosive/destructive changes to suggest acute osteomyelitis, however MRI is more sensitive if clinically indicated. No soft tissue gas. No acute fracture. Mild scattered degenerative arthritis.   Plantar calcaneal spur.   MR Ankle Left w/o Contrast    Impression    IMPRESSION:  1.  Postoperative change amputation at the level of the proximal second through fifth metatarsals and first ray. No evidence for osteomyelitis, with attention to the resection margin.     2.  No abscess.   MR Ankle Right w/o Contrast    Impression    IMPRESSION:  1.  Proximal metatarsal amputation as described. Low-grade bone marrow edema throughout the metatarsal resection margins. These findings are favored to be reactive. While there is an area of ulceration near the first and to a lesser degree fourth   metatarsal resection margins, there is no kelton cortical destructive change or marrow replacement to suggest osteomyelitis at this time.         SIGNIFICANT LABORATORY FINDINGS     Most Recent 3 CBC's:  Recent Labs   Lab Test 05/26/25  0749 05/25/25  0641 05/23/25  0722   WBC 7.6 8.0 7.7   HGB 11.5* 10.9* 11.5*   MCV 84 84 84    233 195     Most Recent 3 BMP's:  Recent Labs   Lab Test 05/26/25  0759 05/26/25  0749 05/26/25  0233 05/25/25  0825 05/25/25  0641 05/24/25  0822 05/24/25  0728   NA  --  139  --   --  139  --  139   POTASSIUM  --  4.5  --   --  4.4  --  4.4   CHLORIDE  --  103  --   --  104  --  105   CO2  --  25  --   --  25  --  23   BUN  --  22.5  --   --  23.8*  --  23.6*   CR  --  1.21*  --   --  1.24*  --  1.13   ANIONGAP  --  11  --   --  10  --  11   MELODY  --  9.7  --   --  9.3  --  9.4   * 120* 125*   < > 138*   < > 136*    < > = values in this interval not displayed.        Discharge Orders        Activity    Podiatry discharge instructions  -Follow-up with Dr. Spears in 2-3 weeks. For scheduling please call [phone].     General info for SNF    Length of Stay Estimate: Long Term Care  Condition at Discharge: Improving  Level of care:skilled   Rehabilitation Potential: Fair  Admission H&P remains valid and up-to-date: Yes  Recent Chemotherapy: N/A  Use Nursing Home Standing Orders: Yes  Free of communicable diseases: No (If no, please update H&P)     Mantoux instructions    Give two-step Mantoux (PPD) Per Facility Policy Yes     Follow Up and recommended labs and tests    Follow up with [facility] physician.  The following labs/tests are recommended: BMP and CBC 1 week.  Follow up with Vascular in the next 1-2 weeks for angiogram.     Reason for your hospital stay    Leg cellulitis     Glucose monitor nursing POCT    Before meals and at bedtime     Wound care (specify)    BLE wound(s): Daily  and PRN if dressing soiled, saturated or falls off  1. Moisten roll of Kerlix with Vashe, wring out excess.  Wrap from just below knee to toes, let sit for 20-30 minutes.  Air dry  2. Apply layer of Eucerin (or similar) from just below knees to toes.  3. Use two pieces of Xeroform on left leg covering shin and calf, secure with dry roll of kerlix  4. Cover right shin and calf with three pieces of Xeroform and one Xeroform covering right foot/toes, secure with dry roll of kerlix     Activity - Up with nursing assistance     Additional Discharge Instructions    Offload heels as able.     Full Code     Physical Therapy Adult Consult    Evaluate and treat as clinically indicated.    Reason:  Weakness, deconditioning     Occupational Therapy Adult Consult    Evaluate and treat as clinically indicated.    Reason:  Weakness, deconditioning     Contact Isolation    +MRSA     Fall precautions     Diet    Follow this diet upon discharge:       Snacks/Supplements Adult: Glucerna; With Meals       Snacks/Supplements Adult: Expedite Cup; With Meals      Regular diet       Examination   Physical Exam   Temp:  [97.5  F (36.4  C)-97.7  F (36.5  C)] 97.6  F (36.4  C)  Pulse:  [70-77] 74  Resp:  [18] 18  BP: (130-149)/(58-67) 133/67  SpO2:  [95 %-97 %] 95 %  Wt Readings from Last 1 Encounters:   05/26/25 79.2 kg (174 lb 11.2 oz)       General: in no apparent distress, non-toxic, and alert male lying in hospital bed oriented x3. Pleasant, appreciative of care, a bit tangential  HEENT: Head normocephalic atraumatic, oral mucosa moist. Sclerae anicteric  Skin: extensive changes of stasis dermatitis BLE, erythema of R knee much improved from yesterday, faint red streaks extending proximally into the groin continue to slightly improve  Extremities: Woody edema bilateral lower legs. R TMA  Psych: Normal affect, mood euthymic  Neuro: Grossly normal    Please see EMR for more detailed significant labs, imaging, consultant notes etc.    ITabatha MD, personally saw the patient today and spent greater than 30 minutes discharging this patient.    Tabatha Beltran MD  [facility]    CC:Sariah Harper

## 2025-05-26 NOTE — PLAN OF CARE
Problem: Adult Inpatient Plan of Care  Goal: Absence of Hospital-Acquired Illness or Injury  Outcome: Progressing  Intervention: Identify and Manage Fall Risk  Recent Flowsheet Documentation  Taken 2025 by Leigha Carlson RN  Safety Promotion/Fall Prevention:   activity supervised   clutter free environment maintained   safety round/check completed   supervised activity  Intervention: Prevent Skin Injury  Recent Flowsheet Documentation  Taken 2025 by Leigha Carlson RN  Body Position:   sitting up in bed   weight shifting  Intervention: Prevent and Manage VTE (Venous Thromboembolism) Risk  Recent Flowsheet Documentation  Taken 2025 by Leigha Carlson RN  VTE Prevention/Management: (dressing applied to BLE)   SCDs off (sequential compression devices)   other (see comments)  Intervention: Prevent Infection  Recent Flowsheet Documentation  Taken 2025 by Leigha Carlson RN  Infection Prevention:   hand hygiene promoted   rest/sleep promoted   personal protective equipment utilized     Problem: Infection  Goal: Absence of Infection Signs and Symptoms  Outcome: Progressing  Intervention: Prevent or Manage Infection  Recent Flowsheet Documentation  Taken 2025 by Leigha Carlson RN  Isolation Precautions: contact precautions maintained     Problem: Comorbidity Management  Goal: Blood Glucose Levels Within Targeted Range  Intervention: Monitor and Manage Glycemia  Recent Flowsheet Documentation  Taken 2025 by Leigha Carlson RN  Medication Review/Management: medications reviewed   Goal Outcome Evaluation:    Blood pressure 130/60, pulse 77, temperature 97.6  F, resp. rate 18, SpO2 95% on RA    A/o but forgetful, 9/10 pain, tylenol given and helpful and patient was able to sleep through the night.     Fluids provided at bedside but patient only had a few sips with meds, output overnight was 400 mL    BLE dressings are CDI    B    Contact precautions  maintained

## 2025-05-26 NOTE — PROGRESS NOTES
Podiatry / Foot and Ankle Surgery Progress Note    May 26, 2025    No intervention planned per Podiatry service.    Patient can follow up with Dr Spears in 2-3 weeks.  Podiatry to sign off.  Please contact if any questions or concerns    Subject: Patient was seen at bedside.  He is resting in bed.  No complaints with the feet at this time    Objective:  Vitals: /67 (BP Location: Left arm)   Pulse 74   Temp 97.6  F (36.4  C) (Oral)   Resp 18   Ht 1.829 m (6')   Wt 79.2 kg (174 lb 11.2 oz)   SpO2 95%   BMI 23.69 kg/m    BMI= Body mass index is 23.69 kg/m .    General:  Patient is alert and orientated.  NAD.     Dressings are intact bilateral lower extremity     Imaging: I personally reviewed images.      EXAM: MR ANKLE LEFT W/O CONTRAST  LOCATION: [facility]  DATE: 5/25/2025     INDICATION: Chronic wounds bilateral feet, concern for osteomyelitis.  COMPARISON: Left foot radiograph dated 05/24/2025.  TECHNIQUE: Unenhanced.     FINDINGS:      Postoperative changes involving proximal metatarsal amputation of the first through fourth metatarsals with complete amputation of the first ray with complete amputation of the first metatarsal. There is no evidence for osteomyelitis along the resection   margins of the proximal second through fifth metatarsals or medial cuneiform. No significant bone marrow edema.     There is nothing to suggest abscess. Fatty atrophy intrinsic musculature of the foot.     No acute tendon or ligament injury.                                                                      IMPRESSION:  1.  Postoperative change amputation at the level of the proximal second through fifth metatarsals and first ray. No evidence for osteomyelitis, with attention to the resection margin.      2.  No abscess.        EXAM: MR ANKLE RIGHT W/O CONTRAST  LOCATION: [facility]  DATE: 5/25/2025     INDICATION: Chronic wounds bilateral feet, concern for  osteomyelitis  COMPARISON:  05/24/2025..  TECHNIQUE: Unenhanced.     FINDINGS:   Transmetatarsal amputation at the level of the proximal metatarsal shaft is again seen. There is no cortical destructive change along the resection margins. There is some low-grade marrow edema along the resection margin of the first through fifth   metatarsals. Adjacent to the first metatarsal amputation site there is a small amount of curvilinear fluid and ulceration, which comes in close contact with the distal aspect of the resection margin. No definitive abscess formation. There is probably a   shallow ulcer superficial to the fourth metatarsal amputation site as well.     There is no soft tissue abscess. No evidence of an acute fracture.     Fatty atrophy intrinsic muscles of the foot. No acute tendon or ligament injury. Mild scattered soft tissue edema and reticulation.                                                                         IMPRESSION:  1.  Proximal metatarsal amputation as described. Low-grade bone marrow edema throughout the metatarsal resection margins. These findings are favored to be reactive. While there is an area of ulceration near the first and to a lesser degree fourth   metatarsal resection margins, there is no kelton cortical destructive change or marrow replacement to suggest osteomyelitis at this time.       EXAM: XR FOOT LEFT G/E 3 VIEWS  LOCATION: [facility]  DATE: 5/24/2025     INDICATION: history of TMA, chronic wounds  COMPARISON: Radiographs 05/24/2025.                                                                      IMPRESSION:      Redemonstrated transmetatarsal amputation. No definite erosive/destructive changes to suggest acute osteomyelitis, however MRI is more sensitive if clinically indicated. No soft tissue gas. No acute fracture. Mild scattered degenerative arthritis.   Plantar calcaneal spur.        Narrative & Impression   EXAM: XR FOOT RIGHT 2  VIEWS  LOCATION: [facility]  DATE: 5/24/2025     INDICATION: history of TMA and heel debridement, chronic wounds at distal TMA site and heel.  ? osteomyelitis or deeper infection  COMPARISON: 10/31/2017                                                                      IMPRESSION: Transmetatarsal amputation. The amputation margins are well-corticated without osseous erosive or destructive change. No osseous erosive or destructive change of the calcaneus. No soft tissue gas. Mild scattered degenerative arthritis.   Plantar calcaneal spur.         ASSESSMENT: 80-year-old man with a history of heart failure, A-fib, peripheral vascular disease, hypertension, diabetes, coronary artery disease, hyperlipidemia h/o TMA left foot and TMA right and partial calcanectomy right, now also with RLE cellulitis with chronic wound and critical limb threatening ischemia      PLAN:  Reviewed patient's chart in epic.  Antibiotics - Linezolid/fluconazole per Infectious Disease  [facility] nursing consult - appreciate cares and recommendations   Vascular Surgery with tentative plan for angiogram in the coming weeks either inpatient or outpatient pending on length of patient stay in hospital  Xray and MRI bilateral foot - no evidence of deep infection or osteomyelitis   Patient is willing to have a pillow placed behind the calf to help offload the heels, will not wear heel offloading boots - have discussed with nursing staff    No intervention planned per Podiatry service.    Patient can follow up with Dr Spears in 2-3 weeks.  Podiatry to sign off.  Please contact if any questions or concerns        Juju Roberts DPM  8:11 AM

## 2025-05-27 ENCOUNTER — TELEPHONE (OUTPATIENT)
Dept: VASCULAR SURGERY | Facility: CLINIC | Age: 80
End: 2025-05-27

## 2025-05-27 ENCOUNTER — NURSING HOME VISIT (OUTPATIENT)
Dept: GERIATRICS | Facility: CLINIC | Age: 80
End: 2025-05-27
Payer: MEDICARE

## 2025-05-27 VITALS
SYSTOLIC BLOOD PRESSURE: 111 MMHG | RESPIRATION RATE: 16 BRPM | OXYGEN SATURATION: 95 % | DIASTOLIC BLOOD PRESSURE: 64 MMHG | WEIGHT: 189 LBS | HEART RATE: 78 BPM | HEIGHT: 70 IN | BODY MASS INDEX: 27.06 KG/M2 | TEMPERATURE: 98.2 F

## 2025-05-27 DIAGNOSIS — J44.9 CHRONIC OBSTRUCTIVE PULMONARY DISEASE, UNSPECIFIED COPD TYPE (H): ICD-10-CM

## 2025-05-27 DIAGNOSIS — I10 ESSENTIAL HYPERTENSION: ICD-10-CM

## 2025-05-27 DIAGNOSIS — L03.115 CELLULITIS OF RIGHT LEG: Primary | ICD-10-CM

## 2025-05-27 DIAGNOSIS — I73.9 PAD (PERIPHERAL ARTERY DISEASE): ICD-10-CM

## 2025-05-27 DIAGNOSIS — I48.19 PERSISTENT ATRIAL FIBRILLATION (H): ICD-10-CM

## 2025-05-27 DIAGNOSIS — I50.30 HEART FAILURE WITH PRESERVED EJECTION FRACTION, NYHA CLASS II (H): ICD-10-CM

## 2025-05-27 LAB
BACTERIA SPEC CULT: NO GROWTH
BACTERIA SPEC CULT: NO GROWTH

## 2025-05-27 PROCEDURE — 99310 SBSQ NF CARE HIGH MDM 45: CPT | Performed by: NURSE PRACTITIONER

## 2025-05-27 NOTE — PROGRESS NOTES
[facility] GERIATRICS    PRIMARY CARE PROVIDER AND CLINIC:  Sariah Harper NP, [address] [address]***  Chief Complaint   Patient presents with    Hospital /U      [place] Medical Record Number:  [id number]  Place of Service where encounter took place:  [facility] [address] [facility] () [[id number]]    Ever Crane  is a 80 year old  (1945), admitted to the above facility from  [facility]. Hospital stay 5/21/25 through 5/26/25..   HPI:    ***    CODE STATUS/ADVANCE DIRECTIVES DISCUSSION:  Full Code  CPR/Full code   ALLERGIES:   Allergies   Allergen Reactions    Lanolin      Other Reaction(s): Not available    Cranberry Extract Itching and Rash    Doxycycline Rash    Latex Rash    Penicillin V Rash     Reaction occurred as a child. Patient tolerated Zosyn 6/2018, Cefazolin 10/2018, and has also tolerated Augmentin.    Penicillins Rash     Reaction occurred as a child. Patient tolerated Zosyn 6/2018, Cefazolin 10/2018, and has also tolerated Augmentin.      PAST MEDICAL HISTORY:   Past Medical History:   Diagnosis Date    A-fib (H)     Acute diastolic heart failure (H) 06/07/2022    Acute posthemorrhagic anemia 05/17/2022    MESHA (acute kidney injury)     Anemia     Atopic keratoconjunctivitis     Atrial fibrillation (H)     Brian Moe: 8/2011 Cardioversion; CHADS2 VASC = 5; he is on warfarin and sotalol     Atrial flutter (H)     Mcgarry's esophagus 01/01/2012    per note of Dr. Tavo Mike of [facility]    Bilateral lower leg cellulitis 08/31/2018    BPH (benign prostatic hyperplasia)     Candidiasis of perineum 01/03/2018    Carotid stenosis     Carotid stenosis, asymptomatic, right     Cellulitis     CHF (congestive heart failure) (H)     Cholecystitis 10/14/2019    Cholecystitis, acute 08/18/2019    Chronic systolic heart failure (H)     Chronic venous stasis dermatitis     Closed nondisplaced intertrochanteric fracture of left femur with routine healing, subsequent  encounter 05/18/2022    Clostridium difficile colitis     Contact with and (suspected) exposure to covid-19 12/13/2021    COPD (chronic obstructive pulmonary disease) (H)     Coronary artery disease due to lipid rich plaque 2000    CABG x2    Coronary atherosclerosis     Diabetes (H)     Diabetic ulcer of both feet (H) 10/31/2017    Diabetic ulcer of toe of right foot associated with diabetes mellitus due to underlying condition, limited to breakdown of skin (H) 01/05/2023    Diabetic ulcer of toe of right foot associated with diabetes mellitus due to underlying condition, with necrosis of bone (H) 01/05/2023    Dyslexia     Dyslipidemia, goal LDL below 70 2000    Epistaxis     Essential hypertension     Gangrene of left foot (H)     GERD (gastroesophageal reflux disease)     HLD (hyperlipidemia)     HTN (hypertension)     Hyponatremia     Ischemic heart disease     Lymphedema     Mild cognitive impairment     MRSA (methicillin resistant Staphylococcus aureus)     Neuropathy     Non-STEMI (non-ST elevated myocardial infarction) (H)     Occlusion and stenosis of right carotid artery     Osteoarthrosis     Osteomyelitis of ankle and foot (H)     Other atopic dermatitis     PAD (peripheral artery disease)     Peripheral vascular disease     Pneumonia 06/26/2018    Polyneuropathy due to type 2 diabetes mellitus (H)     Pressure ulcer, heel     Bilateral    Renal insufficiency     Type 2 diabetes mellitus, without long-term current use of insulin (H)     Ulcer of right foot (H)     Unable to function independently 11/13/2017      PAST SURGICAL HISTORY:   has a past surgical history that includes cabg measures grp; IR Extremity Angiogram Bilateral (11/3/2017); Bypass graft artery coronary (N/A, 03/06/2000); Cardioversion (08/25/2011); Amputate foot (Left, 11/05/2017); Inguinal Hernia Repair (Bilateral, 1969); Cv Coronary Angiogram (N/A, 10/01/2018); Laparoscopic cholecystectomy (N/A, 08/18/2019); Bypass graft artery  "coronary (N/A, 10/04/2018); IR Lower Extremity Angiogram Right (1/24/2023); IR Lower Extremity Angiogram Left (3/10/2023); Amputate foot (Right, 4/27/2023); Lengthen tendon achilles (Right, 4/27/2023); Incision and drainage foot, combined (Right, 5/15/2023); Amputate foot (Right, 5/15/2023); and Incision and drainage foot, combined (Bilateral, 6/1/2023).  FAMILY HISTORY: family history includes CABG in his father; Esophageal Cancer (age of onset: 58.00) in his father; No Known Problems in his grandchild, grandchild, sister, sister, and son; Obesity in his sister; Osteoarthritis in his sister; Sudden Death (age of onset: 85.00) in his mother; Valvular heart disease in his father.  SOCIAL HISTORY:   reports that he quit smoking about 25 years ago. His smoking use included cigarettes. He started smoking about 60 years ago. He has a 36 pack-year smoking history. He has never used smokeless tobacco. He reports that he does not currently use alcohol after a past usage of about 5.0 standard drinks of alcohol per week. He reports that he does not use drugs.  Patient's living condition: lives in a skilled nursing facility    Post Discharge Medication Reconciliation Status:   MED REC REQUIRED{TIP  Click the link below to document or use med rec list, use list to pull in response :[id number]}  Post Medication Reconciliation Status: {MED REC LIST:184671}       Current Outpatient Medications   Medication Sig Dispense Refill    acetaminophen (TYLENOL) 325 MG tablet Take 650 mg by mouth 2 times daily as needed for mild pain      albuterol (PROAIR HFA/PROVENTIL HFA/VENTOLIN HFA) 108 (90 Base) MCG/ACT inhaler Inhale 2 puffs into the lungs 2 times daily as needed      Bismuth Tribromoph-Petrolatum (XEROFORM PETROLAT GAUZE 5\"X9\") MISC Externally apply 5 each topically daily. 150 each 0    cetirizine (ZYRTEC) 5 MG tablet Take 5 mg by mouth daily      clopidogrel (PLAVIX) 75 MG tablet Take 75 mg by mouth daily      " "clotrimazole-betamethasone (LOTRISONE) 1-0.05 % external cream Apply topically 2 times daily      fluconazole (DIFLUCAN) 150 MG tablet Take 1 tablet (150 mg) by mouth daily for 4 days. 4 tablet 0    furosemide (LASIX) 40 MG tablet Take 40 mg by mouth daily      gabapentin (NEURONTIN) 100 MG capsule Take 200 mg by mouth 2 times daily      glimepiride (AMARYL) 1 MG tablet Take 1 mg by mouth every morning (before breakfast)      hypromellose (ARTIFICIAL TEARS) 0.5 % SOLN ophthalmic solution Place 1 drop Into the left eye 4 times daily as needed for dry eyes      ketoconazole (NIZORAL) 2 % external shampoo Apply topically twice a week Mon and Fri.      linezolid (ZYVOX) 600 MG tablet Take 1 tablet (600 mg) by mouth every 12 hours for 5 days. 10 tablet 0    losartan (COZAAR) 25 MG tablet Take 12.5 mg by mouth daily      naloxone (NARCAN) 0.4 MG/ML injection Inject 0.1-0.4 mg into the muscle once as needed for opioid reversal      pantoprazole (PROTONIX) 40 MG EC tablet Take 1 tablet (40 mg) by mouth daily. 30 tablet 0    rivaroxaban ANTICOAGULANT (XARELTO) 15 MG TABS tablet Take 1 tablet (15 mg) by mouth daily. 30 tablet 0    [Paused] simvastatin (ZOCOR) 40 MG tablet Take 40 mg by mouth At Bedtime      spironolactone (ALDACTONE) 25 MG tablet Take 25 mg by mouth daily      tamsulosin (FLOMAX) 0.4 MG capsule Take 0.4 mg by mouth daily      Wound Cleansers (VASHE WOUND) 0.033 % SOLN Externally apply 1 Application topically daily. 1000 mL 0     No current facility-administered medications for this visit.       ROS:  10 point ROS of systems including Constitutional, Eyes, Respiratory, Cardiovascular, Gastroenterology, Genitourinary, Integumentary, Musculoskeletal, Psychiatric were all negative except for pertinent positives noted in my HPI.    Vitals:  /64   Pulse 78   Temp 98.2  F (36.8  C)   Resp 16   Ht 1.778 m (5' 10\")   Wt 85.7 kg (189 lb)   SpO2 95%   BMI 27.12 kg/m    Exam:  GENERAL APPEARANCE:  {NURSING " "HOME GENERAL APPEARANCE:838642}  ENT:  {FPC ENT PE:841796::\"Mouth and posterior oropharynx normal, moist mucous membranes\"}  EYES:  {FPC EYES PE :148323::\"EOM, conjunctivae, lids, pupils and irises normal\"}  RESP:  {FPC RESP PE:270624}  CV:  {FPC CV PE:383791}  ABDOMEN:  {FPC GI PE:343885::\"normal bowel sounds, soft, nontender, no hepatosplenomegaly or other masses\"}  M/S:   {FPC M/S PE:006409}  SKIN:  {NURSING HOME SKIN PE:373050}  NEURO:   {FPC NEURO PE:800275}  PSYCH:  {FPC PSYCH PE:607989}    Lab/Diagnostic data:  {fgslab:390830}    ASSESSMENT/PLAN:    {S DX2:912083}    Orders:  {fgsorders:196117}  ***    Electronically signed by:  Elma Mcmillan MA ***               " 100 06/23/2025    CO2 22 06/23/2025    ANIONGAP 16 (H) 06/23/2025     (H) 06/23/2025    BUN 26.0 (H) 06/23/2025    CR 1.43 (H) 06/23/2025    GFRESTIMATED 50 (L) 06/23/2025    MELODY 9.1 06/23/2025       TSH   Date Value Ref Range Status   07/12/2022 3.20 0.30 - 4.20 uIU/mL Final   03/09/2021 2.06 0.30 - 5.00 uIU/mL Final   06/26/2018 0.86 0.40 - 4.00 mU/L Final     Hemoglobin A1C   Date Value Ref Range Status   05/21/2025 6.6 (H) <5.7 % Final     Comment:     Normal <5.7%   Prediabetes 5.7-6.4%    Diabetes 6.5% or higher     Note: Adopted from ADA consensus guidelines.   06/26/2018 7.7 (H) 0 - 5.6 % Final     Comment:     Normal <5.7% Prediabetes 5.7-6.4%  Diabetes 6.5% or higher - adopted from ADA   consensus guidelines.           ASSESSMENT/PLAN:    (L03.115) Cellulitis of right leg  (primary encounter diagnosis)  Acute, improving.  Continue medications as ordered and wound care.  Continue to monitor and update MD changes.  In-house wound NP to see Friday. See wound care ordered at time of discharge.     BLE wound(s): Daily  and PRN if dressing soiled, saturated or falls off  1.         Moisten roll of Kerlix with Vashe, wring out excess.  Wrap from just below knee to toes, let sit for 20-30 minutes.  Air dry  2.Apply layer of Eucerin (or similar) from just below knees to toes.  3.Use two pieces of Xeroform on left leg covering shin and calf, secure with dry roll of kerlix  4.Cover right shin and calf with three pieces of Xeroform and one Xeroform covering right foot/toes, secure with dry roll of kerlix       (I50.30) Heart failure with preserved ejection fraction, NYHA class II (H)  Chronic, stable.    Continue with plan of care no changes at this time, adjustment as needed   -Furosemide 40 mg/day   -Spironolactone 25 mg/day     (I48.19) Persistent atrial fibrillation (H)  Chronic, stable.   -Not on rate controlling medications  -Xarelto 15 mg/day    (I10) Essential hypertension  Chronic, stable.   BP  Readings from Last 3 Encounters:   06/27/25 135/84   06/24/25 120/75   05/27/25 111/64   -Losartan 12.5 mg/day   -Spironolactone 25 mg/day   Goal BP <150/90    (J44.9) Chronic obstructive pulmonary disease, unspecified COPD type (H)  Chronic.   Continue with plan of care no changes at this time, adjustment as needed      (I73.9) PAD (peripheral artery disease)  Chronic  Arterial US shows EVELYNE RLE 0.68, LLE 1.37  -- plavix and Xarelto   -- vascular planning angiogram in the near future as outpatient     Orders:  The current medical regimen is effective; continue present plan and medications.      66 minutes spent on the date of the encounter doing chart review, history and exam, documentation and further activities as noted above.     Electronically signed by:  Sariah Harper NP

## 2025-05-27 NOTE — LETTER
5/27/2025      Ever Crane  [address] [facility] [address]        No notes on file      Sincerely,        Sariah Harper NP    Electronically signed

## 2025-05-27 NOTE — TELEPHONE ENCOUNTER
EMMAM [place] (home ph#) to schedule follow up with Dr. Spears in 2-3 weeks for bilateral foot wounds. [phone]  ______________________________  ----- Message from Juju Roberts sent at 5/26/2025  8:15 AM CDT -----  Please have patient follow up with Dr Spears for wound care/post hospital in 2-3 weeks.    Thanks,    Juju

## 2025-05-28 NOTE — TELEPHONE ENCOUNTER
[place]. She states he sees the wound provider there on and she was going to check with Ever and call back if needing to schedule [phone]

## 2025-05-29 ENCOUNTER — TELEPHONE (OUTPATIENT)
Dept: VASCULAR SURGERY | Facility: CLINIC | Age: 80
End: 2025-05-29
Payer: MEDICARE

## 2025-05-29 NOTE — TELEPHONE ENCOUNTER
Discussed angiogram plans/instructions. The pt will schedule labs. Will send angiogram packet once medications have been reviewed.    Procedure: Right  Leg  Angiogram     Procedure Date :  06/27/2025    Procedure Time :  10:00am    Arrival Time: 9:00am    Admission Type: Outpatient    Surgeon: Dr. Brandie Boyd    Procedure Location: [facility]:  [facility] [place] [address] (phone: [phone], Fax: [phone])    Consent Completed:No, Scanned: No    Scheduled with: Cosmei    Anesthesia Needed: no IF Yes Please clarify that the CRNA, anesthesia and [facility]ADYSON/PACU resources are being added at scheduling    Blood Thinners Address: Message sent to support pool to review the patients medication.     2 week Post Procedure Appointment and also ultrasound: TBD- requested orders        ----- Message from Roula WOO sent at 5/28/2025  1:27 PM CDT -----    ----- Message -----  From: Nancy Pham MD  Sent: 5/28/2025   1:12 PM CDT  To: [facility] Support Pool; Vascular #    Can you schedule this pt for RLE angio with Dr. Boyd in 2-3 weeks

## 2025-05-29 NOTE — TELEPHONE ENCOUNTER
Valeria Harper pt is scheduled for an angiogram on 6/27/25. Vascular clinic protocol is to hold Xarelto for 24 hours prior to procedure. Ok to hold this from primary care prospective?    Lin Mancuso RN, CWOCN

## 2025-05-29 NOTE — TELEPHONE ENCOUNTER
Reviewed Blood Thinners and plan for holding, continuing and/or bridging: xarelto, hold 24 hours prior to procedure    Reviewed Diabetic medications that are GLP-1 agonists: NA  Please discuss with your primary doctor and follow the hold instructions:   []  Hold seven (7) days prior for once weekly injectable doses [semaglutide (Ozempic, Wegovy), dulaglutide (Trulicity), exenatide ER (Bydureon), tirzepatide (Mounjaro)]  []  Hold the day before and day of for once daily injectable GLP-1 agonists [exenatide (Byetta), liraglutide (Saxenda, Victoza)]  []  Hold seven (7) days for oral semaglutide (Rybelsus)

## 2025-06-22 ENCOUNTER — LAB REQUISITION (OUTPATIENT)
Dept: LAB | Facility: CLINIC | Age: 80
End: 2025-06-22

## 2025-06-22 DIAGNOSIS — I73.9 PERIPHERAL VASCULAR DISEASE, UNSPECIFIED: ICD-10-CM

## 2025-06-23 LAB
ANION GAP SERPL CALCULATED.3IONS-SCNC: 16 MMOL/L (ref 7–15)
BUN SERPL-MCNC: 26 MG/DL (ref 8–23)
CALCIUM SERPL-MCNC: 9.1 MG/DL (ref 8.8–10.4)
CHLORIDE SERPL-SCNC: 100 MMOL/L (ref 98–107)
CREAT SERPL-MCNC: 1.43 MG/DL (ref 0.67–1.17)
EGFRCR SERPLBLD CKD-EPI 2021: 50 ML/MIN/1.73M2
GLUCOSE SERPL-MCNC: 133 MG/DL (ref 70–99)
HCO3 SERPL-SCNC: 22 MMOL/L (ref 22–29)
POTASSIUM SERPL-SCNC: 3.9 MMOL/L (ref 3.4–5.3)
SODIUM SERPL-SCNC: 138 MMOL/L (ref 135–145)

## 2025-06-23 PROCEDURE — 80048 BASIC METABOLIC PNL TOTAL CA: CPT | Performed by: FAMILY MEDICINE

## 2025-06-23 PROCEDURE — 36415 COLL VENOUS BLD VENIPUNCTURE: CPT | Performed by: FAMILY MEDICINE

## 2025-06-23 PROCEDURE — P9604 ONE-WAY ALLOW PRORATED TRIP: HCPCS | Performed by: FAMILY MEDICINE

## 2025-06-24 ENCOUNTER — NURSING HOME VISIT (OUTPATIENT)
Dept: GERIATRICS | Facility: CLINIC | Age: 80
End: 2025-06-24
Payer: MEDICARE

## 2025-06-24 DIAGNOSIS — L03.119 CELLULITIS OF LOWER EXTREMITY, UNSPECIFIED LATERALITY: ICD-10-CM

## 2025-06-24 DIAGNOSIS — Z79.4 TYPE 2 DIABETES MELLITUS WITH OTHER CIRCULATORY COMPLICATION, WITH LONG-TERM CURRENT USE OF INSULIN (H): Chronic | ICD-10-CM

## 2025-06-24 DIAGNOSIS — J44.9 CHRONIC OBSTRUCTIVE PULMONARY DISEASE, UNSPECIFIED COPD TYPE (H): ICD-10-CM

## 2025-06-24 DIAGNOSIS — I10 ESSENTIAL HYPERTENSION: ICD-10-CM

## 2025-06-24 DIAGNOSIS — E11.59 TYPE 2 DIABETES MELLITUS WITH OTHER CIRCULATORY COMPLICATION, WITH LONG-TERM CURRENT USE OF INSULIN (H): Chronic | ICD-10-CM

## 2025-06-24 DIAGNOSIS — I50.30 HEART FAILURE WITH PRESERVED EJECTION FRACTION, NYHA CLASS II (H): Primary | ICD-10-CM

## 2025-06-24 DIAGNOSIS — N18.32 STAGE 3B CHRONIC KIDNEY DISEASE (H): Chronic | ICD-10-CM

## 2025-06-24 PROCEDURE — 99310 SBSQ NF CARE HIGH MDM 45: CPT | Performed by: NURSE PRACTITIONER

## 2025-06-24 NOTE — LETTER
6/24/2025      Ever Crane  [address] [facility] [address]        No notes on file      Sincerely,        Sariah Harper NP    Electronically signed

## 2025-06-25 VITALS
TEMPERATURE: 97.8 F | WEIGHT: 176 LBS | BODY MASS INDEX: 25.2 KG/M2 | OXYGEN SATURATION: 98 % | SYSTOLIC BLOOD PRESSURE: 120 MMHG | RESPIRATION RATE: 18 BRPM | HEIGHT: 70 IN | DIASTOLIC BLOOD PRESSURE: 75 MMHG | HEART RATE: 75 BPM

## 2025-06-25 NOTE — PROGRESS NOTES
[facility] GERIATRICS  Chief Complaint   Patient presents with    half-way Regulatory     [place] Medical Record Number:  [id number]  Place of Service where encounter took place:  [facility] ([place]) [[id number]]    HPI:    Ever Crane  is 80 year old (1945), who is being seen today for a federally mandated E/M visit. Today's concerns are:  1. Heart failure with preserved ejection fraction, NYHA class II (H)    2. Essential hypertension    3. Chronic obstructive pulmonary disease, unspecified COPD type (H)    4. Type 2 diabetes mellitus with other circulatory complication, with long-term current use of insulin (H)    5. Stage 3b chronic kidney disease (H)    Patient resting the edge of the bed today upon visit.  Reports he is doing well and is at his baseline.  RN states that he has having increased lower extremity redness and pain, especially right lower leg.  Patient rated pain an 8 out of 10 but feels like pain medication is adequate.  Denies chest pain, shortness of breath, lightheadedness.  Has no further concerns at this time.    Vital signs reviewed by me today  BP Readings from Last 3 Encounters:   06/27/25 135/84   06/24/25 120/75   05/27/25 111/64     Pulse Readings from Last 4 Encounters:   06/27/25 96   06/24/25 75   05/27/25 78   05/26/25 74     Wt Readings from Last 4 Encounters:   06/24/25 79.8 kg (176 lb)   05/27/25 85.7 kg (189 lb)   05/26/25 79.2 kg (174 lb 11.2 oz)   04/14/25 84.7 kg (186 lb 12.8 oz)         ALLERGIES:Lanolin, Cranberry extract, Doxycycline, Latex, Penicillin v, and Penicillins  PAST MEDICAL HISTORY:   Past Medical History:   Diagnosis Date    A-fib (H)     Acute diastolic heart failure (H) 06/07/2022    Acute posthemorrhagic anemia 05/17/2022    MESHA (acute kidney injury)     Anemia     Atopic keratoconjunctivitis     Atrial fibrillation (H)     Brian Moe: 8/2011 Cardioversion; CHADS2 VASC = 5; he is on warfarin and sotalol     Atrial flutter (H)      Mcgarry's esophagus 01/01/2012    per note of Dr. Tavo Mike of [facility]    Bilateral lower leg cellulitis 08/31/2018    BPH (benign prostatic hyperplasia)     Candidiasis of perineum 01/03/2018    Carotid stenosis     Carotid stenosis, asymptomatic, right     Cellulitis     CHF (congestive heart failure) (H)     Cholecystitis 10/14/2019    Cholecystitis, acute 08/18/2019    Chronic systolic heart failure (H)     Chronic venous stasis dermatitis     Closed nondisplaced intertrochanteric fracture of left femur with routine healing, subsequent encounter 05/18/2022    Clostridium difficile colitis     Contact with and (suspected) exposure to covid-19 12/13/2021    COPD (chronic obstructive pulmonary disease) (H)     Coronary artery disease due to lipid rich plaque 2000    CABG x2    Coronary atherosclerosis     Diabetes (H)     Diabetic ulcer of both feet (H) 10/31/2017    Diabetic ulcer of toe of right foot associated with diabetes mellitus due to underlying condition, limited to breakdown of skin (H) 01/05/2023    Diabetic ulcer of toe of right foot associated with diabetes mellitus due to underlying condition, with necrosis of bone (H) 01/05/2023    Dyslexia     Dyslipidemia, goal LDL below 70 2000    Epistaxis     Essential hypertension     Gangrene of left foot (H)     GERD (gastroesophageal reflux disease)     HLD (hyperlipidemia)     HTN (hypertension)     Hyponatremia     Ischemic heart disease     Lymphedema     Mild cognitive impairment     MRSA (methicillin resistant Staphylococcus aureus)     Neuropathy     Non-STEMI (non-ST elevated myocardial infarction) (H)     Occlusion and stenosis of right carotid artery     Osteoarthrosis     Osteomyelitis of ankle and foot (H)     Other atopic dermatitis     PAD (peripheral artery disease)     Peripheral vascular disease     Pneumonia 06/26/2018    Polyneuropathy due to type 2 diabetes mellitus (H)     Pressure ulcer, heel     Bilateral    Renal insufficiency     Type 2  diabetes mellitus, without long-term current use of insulin (H)     Ulcer of right foot (H)     Unable to function independently 11/13/2017     PAST SURGICAL HISTORY:   has a past surgical history that includes cabg measures grp; IR Extremity Angiogram Bilateral (11/3/2017); Bypass graft artery coronary (N/A, 03/06/2000); Cardioversion (08/25/2011); Amputate foot (Left, 11/05/2017); Inguinal Hernia Repair (Bilateral, 1969); Cv Coronary Angiogram (N/A, 10/01/2018); Laparoscopic cholecystectomy (N/A, 08/18/2019); Bypass graft artery coronary (N/A, 10/04/2018); IR Lower Extremity Angiogram Right (1/24/2023); IR Lower Extremity Angiogram Left (3/10/2023); Amputate foot (Right, 4/27/2023); Lengthen tendon achilles (Right, 4/27/2023); Incision and drainage foot, combined (Right, 5/15/2023); Amputate foot (Right, 5/15/2023); and Incision and drainage foot, combined (Bilateral, 6/1/2023).  FAMILY HISTORY: family history includes CABG in his father; Esophageal Cancer (age of onset: 58.00) in his father; No Known Problems in his grandchild, grandchild, sister, sister, and son; Obesity in his sister; Osteoarthritis in his sister; Sudden Death (age of onset: 85.00) in his mother; Valvular heart disease in his father.  SOCIAL HISTORY:  reports that he quit smoking about 25 years ago. His smoking use included cigarettes. He started smoking about 61 years ago. He has a 36 pack-year smoking history. He has never used smokeless tobacco. He reports that he does not currently use alcohol after a past usage of about 5.0 standard drinks of alcohol per week. He reports that he does not use drugs.    MEDICATIONS:  MED REC REQUIRED  Post Medication Reconciliation Status: patient was not discharged from an inpatient facility or TCU         Review of your medicines            Accurate as of June 24, 2025 11:59 PM. If you have any questions, ask your nurse or doctor.                CONTINUE these medicines which have NOT CHANGED        Dose /  Directions   acetaminophen 325 MG tablet  Commonly known as: TYLENOL      Dose: 650 mg  Take 650 mg by mouth 2 times daily as needed for mild pain  Refills: 0     albuterol 108 (90 Base) MCG/ACT inhaler  Commonly known as: PROAIR HFA/PROVENTIL HFA/VENTOLIN HFA      Dose: 2 puff  Inhale 2 puffs into the lungs 2 times daily as needed  Refills: 0     cetirizine 5 MG tablet  Commonly known as: zyrTEC      Dose: 5 mg  Take 5 mg by mouth daily  Refills: 0     clopidogrel 75 MG tablet  Commonly known as: PLAVIX      Dose: 75 mg  Take 75 mg by mouth daily  Refills: 0     clotrimazole-betamethasone 1-0.05 % external cream  Commonly known as: LOTRISONE  Used for: Cellulitis of left lower leg, Non-pressure chronic ulcer left lower leg, limited to breakdown skin (H)      Apply topically 2 times daily  Refills: 0     furosemide 40 MG tablet  Commonly known as: LASIX      Dose: 40 mg  Take 40 mg by mouth daily  Refills: 0     gabapentin 100 MG capsule  Commonly known as: NEURONTIN      Dose: 200 mg  Take 200 mg by mouth 2 times daily  Refills: 0     glimepiride 1 MG tablet  Commonly known as: AMARYL      Dose: 1 mg  Take 1 mg by mouth every morning (before breakfast)  Refills: 0     hypromellose 0.5 % Soln ophthalmic solution  Commonly known as: ARTIFICIAL TEARS      Dose: 1 drop  Place 1 drop Into the left eye 4 times daily as needed for dry eyes  Refills: 0     ketoconazole 2 % external shampoo  Commonly known as: NIZORAL      Apply topically twice a week Mon and Fri.  Refills: 0     losartan 25 MG tablet  Commonly known as: COZAAR      Dose: 12.5 mg  Take 12.5 mg by mouth daily  Refills: 0     naloxone 0.4 MG/ML injection  Commonly known as: NARCAN      Dose: 0.1-0.4 mg  Inject 0.1-0.4 mg into the muscle once as needed for opioid reversal  Refills: 0     pantoprazole 40 MG EC tablet  Commonly known as: PROTONIX  Used for: Mcgarry's esophagus without dysplasia      Dose: 40 mg  Take 1 tablet (40 mg) by mouth daily.  Quantity:  "30 tablet  Refills: 0     rivaroxaban ANTICOAGULANT 15 MG Tabs tablet  Commonly known as: XARELTO  Used for: Persistent atrial fibrillation (H)      Dose: 15 mg  Take 1 tablet (15 mg) by mouth daily.  Quantity: 30 tablet  Refills: 0     simvastatin 40 MG tablet  Commonly known as: ZOCOR      Dose: 40 mg  Take 40 mg by mouth At Bedtime  Refills: 0     spironolactone 25 MG tablet  Commonly known as: ALDACTONE      Dose: 25 mg  Take 25 mg by mouth daily  Refills: 0     tamsulosin 0.4 MG capsule  Commonly known as: FLOMAX      Dose: 0.4 mg  Take 0.4 mg by mouth daily  Refills: 0     Vashe Wound 0.033 % Soln  Used for: Cellulitis of right lower extremity, Venous stasis dermatitis [I87.2]      Dose: 1 Application  Externally apply 1 Application topically daily.  Quantity: 1000 mL  Refills: 0     Xeroform Petrolat Gauze 5\"x9\" Misc  Used for: Cellulitis of right lower extremity, Venous stasis dermatitis [I87.2]      Dose: 5 each  Externally apply 5 each topically daily.  Quantity: 150 each  Refills: 0               Case Management:  I have reviewed the care plan and MDS and do agree with the plan. Patient's desire to return to the community is not present. Information reviewed:  Medications, vital signs, orders, and nursing notes.    ROS:  10 point ROS of systems including Constitutional, Eyes, Respiratory, Cardiovascular, Gastroenterology, Genitourinary, Integumentary, Musculoskeletal, Psychiatric were all negative except for pertinent positives noted in my HPI.    Vitals:  /75   Pulse 75   Temp 97.8  F (36.6  C)   Resp 18   Ht 1.778 m (5' 10\")   Wt 79.8 kg (176 lb)   SpO2 98%   BMI 25.25 kg/m    Body mass index is 25.25 kg/m .  Exam:  A & O x 3, NAD. Lungs CTA, non labored. RRR, S1/S2 w/o murmur,gallop or rub.  edema.  Abdomen soft, nontender, +BT'S. No focal neurological deficits.  Bilateral lower extremity redness, swelling and pain      Lab/Diagnostic data:   Recent labs in [facility] reviewed by me today.   Lab " Results   Component Value Date    WBC 6.9 07/11/2025    WBC 7.3 06/30/2018     Lab Results   Component Value Date    RBC 4.23 07/11/2025    RBC 3.67 06/30/2018     Lab Results   Component Value Date    HGB 11.7 07/11/2025    HGB 10.5 06/30/2018     Lab Results   Component Value Date    HCT 36.4 07/11/2025    HCT 30.5 06/30/2018     Lab Results   Component Value Date    MCV 86 07/11/2025    MCV 83 06/30/2018     Lab Results   Component Value Date    MCH 27.7 07/11/2025    MCH 28.6 06/30/2018     Lab Results   Component Value Date    MCHC 32.1 07/11/2025    MCHC 34.4 06/30/2018     Lab Results   Component Value Date    RDW 14.6 07/11/2025    RDW 13.7 06/30/2018     Lab Results   Component Value Date     07/11/2025     06/30/2018     Last Comprehensive Metabolic Panel:  Lab Results   Component Value Date     07/11/2025    POTASSIUM 3.9 07/11/2025    CHLORIDE 99 07/11/2025    CO2 21 (L) 07/11/2025    ANIONGAP 17 (H) 07/11/2025     (H) 07/11/2025    BUN 28.3 (H) 07/11/2025    CR 1.27 (H) 07/11/2025    GFRESTIMATED 57 (L) 07/11/2025    MELODY 9.2 07/11/2025       TSH   Date Value Ref Range Status   07/12/2022 3.20 0.30 - 4.20 uIU/mL Final   03/09/2021 2.06 0.30 - 5.00 uIU/mL Final   06/26/2018 0.86 0.40 - 4.00 mU/L Final     Hemoglobin A1C   Date Value Ref Range Status   05/21/2025 6.6 (H) <5.7 % Final     Comment:     Normal <5.7%   Prediabetes 5.7-6.4%    Diabetes 6.5% or higher     Note: Adopted from ADA consensus guidelines.   06/26/2018 7.7 (H) 0 - 5.6 % Final     Comment:     Normal <5.7% Prediabetes 5.7-6.4%  Diabetes 6.5% or higher - adopted from ADA   consensus guidelines.           ASSESSMENT/PLAN  (I50.30) Heart failure with preserved ejection fraction, NYHA class II (H)  (primary encounter diagnosis)  Chronic, stable.  Continue on Lasix 40 mg p.o. daily.  Wound care NP monitoring lower extremities.  Continue without change at this time    (I10) Essential hypertension  Chronic, stable.   Continue losartan 12.5 mg p.o. daily    (J44.9) Chronic obstructive pulmonary disease, unspecified COPD type (H)  Chronic, without exacerbation.Continue with plan of care no changes at this time, adjustment as needed    (E11.59,  Z79.4) Type 2 diabetes mellitus with other circulatory complication, with long-term current use of insulin (H)  Chronic, stable   Hemoglobin A1C   Date Value Ref Range Status   05/21/2025 6.6 (H) <5.7 % Final     Comment:     Normal <5.7%   Prediabetes 5.7-6.4%    Diabetes 6.5% or higher     Note: Adopted from ADA consensus guidelines.   06/26/2018 7.7 (H) 0 - 5.6 % Final     Comment:     Normal <5.7% Prediabetes 5.7-6.4%  Diabetes 6.5% or higher - adopted from ADA   consensus guidelines.     Continue glimepiride 1 mg every morning.  Gabapentin for neuropathy.   A1c yearly and PRN.     (N18.32) Stage 3b chronic kidney disease (H)  Chronic stable.   Creatinine   Date Value Ref Range Status   07/11/2025 1.27 (H) 0.67 - 1.17 mg/dL Final   06/30/2018 1.20 0.66 - 1.25 mg/dL Final   Avoid nephrotoxic medications. Renally dose medications. Monitor electrolytes, and dehydration status   -BMP q6 months and PRN    (L03.119) Cellulitis of lower extremity, unspecified laterality  New onset of bilateral LE cellulitis   -Keflex 500 mg BID x10 days     65 minutes spent on the date of the encounter doing chart review, history and exam, documentation and further activities as noted above.     Electronically signed by:  Sariah Harper NP

## 2025-06-27 ENCOUNTER — HOSPITAL ENCOUNTER (OUTPATIENT)
Dept: INTERVENTIONAL RADIOLOGY/VASCULAR | Facility: HOSPITAL | Age: 80
Discharge: HOME | End: 2025-06-27
Attending: STUDENT IN AN ORGANIZED HEALTH CARE EDUCATION/TRAINING PROGRAM
Payer: MEDICARE

## 2025-06-27 VITALS
SYSTOLIC BLOOD PRESSURE: 135 MMHG | HEART RATE: 96 BPM | RESPIRATION RATE: 17 BRPM | OXYGEN SATURATION: 98 % | DIASTOLIC BLOOD PRESSURE: 84 MMHG

## 2025-06-27 NOTE — IR NOTE
Patient Name: Ever Crane  Medical Record Number: [id number]  Today's Date: 6/27/2025    Procedure: Right Leg Angiogram  Proceduralist: Dr. Boyd  Pathology present: no    Procedure canceled due to pt getting his xareleto last night before bedtime at care facility, verified with facility staff per phone call to [phone]. Pt had IV removed was dressed and wheeled out to car with son.

## 2025-07-10 ENCOUNTER — LAB REQUISITION (OUTPATIENT)
Dept: LAB | Facility: CLINIC | Age: 80
End: 2025-07-10
Payer: MEDICARE

## 2025-07-10 DIAGNOSIS — L03.115 CELLULITIS OF RIGHT LOWER LIMB: ICD-10-CM

## 2025-07-11 LAB
ANION GAP SERPL CALCULATED.3IONS-SCNC: 17 MMOL/L (ref 7–15)
BASOPHILS # BLD AUTO: 0.1 10E3/UL (ref 0–0.2)
BASOPHILS NFR BLD AUTO: 1 %
BUN SERPL-MCNC: 28.3 MG/DL (ref 8–23)
CALCIUM SERPL-MCNC: 9.2 MG/DL (ref 8.8–10.4)
CHLORIDE SERPL-SCNC: 99 MMOL/L (ref 98–107)
CREAT SERPL-MCNC: 1.27 MG/DL (ref 0.67–1.17)
EGFRCR SERPLBLD CKD-EPI 2021: 57 ML/MIN/1.73M2
EOSINOPHIL # BLD AUTO: 0.4 10E3/UL (ref 0–0.7)
EOSINOPHIL NFR BLD AUTO: 6 %
ERYTHROCYTE [DISTWIDTH] IN BLOOD BY AUTOMATED COUNT: 14.6 % (ref 10–15)
GLUCOSE SERPL-MCNC: 221 MG/DL (ref 70–99)
HCO3 SERPL-SCNC: 21 MMOL/L (ref 22–29)
HCT VFR BLD AUTO: 36.4 % (ref 40–53)
HGB BLD-MCNC: 11.7 G/DL (ref 13.3–17.7)
IMM GRANULOCYTES # BLD: 0 10E3/UL
IMM GRANULOCYTES NFR BLD: 0 %
LYMPHOCYTES # BLD AUTO: 1.2 10E3/UL (ref 0.8–5.3)
LYMPHOCYTES NFR BLD AUTO: 17 %
MCH RBC QN AUTO: 27.7 PG (ref 26.5–33)
MCHC RBC AUTO-ENTMCNC: 32.1 G/DL (ref 31.5–36.5)
MCV RBC AUTO: 86 FL (ref 78–100)
MONOCYTES # BLD AUTO: 0.6 10E3/UL (ref 0–1.3)
MONOCYTES NFR BLD AUTO: 9 %
NEUTROPHILS # BLD AUTO: 4.7 10E3/UL (ref 1.6–8.3)
NEUTROPHILS NFR BLD AUTO: 67 %
NRBC # BLD AUTO: 0 10E3/UL
NRBC BLD AUTO-RTO: 0 /100
PLATELET # BLD AUTO: 202 10E3/UL (ref 150–450)
POTASSIUM SERPL-SCNC: 3.9 MMOL/L (ref 3.4–5.3)
RBC # BLD AUTO: 4.23 10E6/UL (ref 4.4–5.9)
SODIUM SERPL-SCNC: 137 MMOL/L (ref 135–145)
WBC # BLD AUTO: 6.9 10E3/UL (ref 4–11)

## 2025-07-11 PROCEDURE — 82310 ASSAY OF CALCIUM: CPT | Performed by: FAMILY MEDICINE

## 2025-07-11 PROCEDURE — P9604 ONE-WAY ALLOW PRORATED TRIP: HCPCS | Performed by: FAMILY MEDICINE

## 2025-07-11 PROCEDURE — 36415 COLL VENOUS BLD VENIPUNCTURE: CPT | Performed by: FAMILY MEDICINE

## 2025-07-11 PROCEDURE — 85025 COMPLETE CBC W/AUTO DIFF WBC: CPT | Performed by: FAMILY MEDICINE

## 2025-07-11 PROCEDURE — 80048 BASIC METABOLIC PNL TOTAL CA: CPT | Performed by: FAMILY MEDICINE

## 2025-07-16 NOTE — TELEPHONE ENCOUNTER
"[facility] Triage Call    Provider: KEVIN Davidson CNP  Facility: [facility] Facility Type:  LT    Caller: Liz ([employer])   Call Back Number: [phone]    Allergies:    Allergies   Allergen Reactions    Lanolin      Other Reaction(s): Not available    Cranberry Extract Itching and Rash    Doxycycline Rash    Latex Rash    Penicillin V Rash     Reaction occurred as a child. Patient tolerated Zosyn 6/2018, Cefazolin 10/2018, and has also tolerated Augmentin.    Penicillins Rash     Reaction occurred as a child. Patient tolerated Zosyn 6/2018, Cefazolin 10/2018, and has also tolerated Augmentin.        SBAR:     S-(situation): A representative from [employer] called to report that the Trulicity is no longer on the patient's formulary and will not be covered by insurance.   The representative informed that Ozempic and Mounjaro are covered alternatives at this time.     B-(background): Currently on Trulicity 1.5 mg weekly    A-(assessment): n/a    R-(recommendations): Please advise of new orders      Telephone encounter sent to:  KEVIN Davidson CNP    Please send response/orders to \"Geriatrics Nurse Pool\"    Nuha Durbin RN      " Statement Selected

## 2025-07-22 ENCOUNTER — TELEPHONE (OUTPATIENT)
Dept: VASCULAR SURGERY | Facility: CLINIC | Age: 80
End: 2025-07-22
Payer: MEDICARE

## 2025-07-22 ENCOUNTER — LAB REQUISITION (OUTPATIENT)
Dept: LAB | Facility: CLINIC | Age: 80
End: 2025-07-22
Payer: MEDICARE

## 2025-07-22 DIAGNOSIS — Z01.818 ENCOUNTER FOR OTHER PREPROCEDURAL EXAMINATION: ICD-10-CM

## 2025-07-22 NOTE — TELEPHONE ENCOUNTER
Procedure: RLE Angiogram    Date: Thursday 7/24/25    Surgeon: Dr. Brandie Boyd    Called patient to review upcoming procedure. Reviewed visitor allowance of 1 person at this time.     Discussed procedure with patients and instructions: Discussed with son Raciel.    Reviewed Post Procedure Follow up plan and Appts made: Yes: Discussed with [facility] that post procedure appointments will need to be updated due to the angiogram being rescheduled, our scheduling team will reach out    Reviewed Blood Thinners and plan for holding, continuing and/or bridging:  Xarelto - Hold 24 hours prior to procedure - confirmed with [facility] that they received the order      Reviewed Pre Op Appointment Required and Completed: N/A    Reviewed Allergies and if Contrast Allergy-Instructions and plan: No contrast allergy    Reviewed Arrival Time of 7am and procedure time of 8am and location of procedure [facility]:  [address] [address] (Phone: [phone], Fax: [phone])    Please enter through the main entrance. Check in at the Welcome Desk and you will be directed to the surgery unit.     Reviewed NPO after midnight the night before: Yes: Confirmed with [facility] that they have received the instructions    Faxed order for pre procedure CBC and BMP to be drawn prior to procedure, updated their HUC.     All questions answered and number provided if any further questions arise or changes in patient status.

## 2025-07-22 NOTE — TELEPHONE ENCOUNTER
LVMTCB #1 to moves appointments on 7/30 and 8/5 to 2-3 weeks out [phone]      ----- Message from Heather SALEH sent at 7/22/2025  9:14 AM CDT -----  Regarding: reschedule  Hi, this patient's angiogram was postponed. Pam saw that having his ultrasounds done on 7/30 is going to be too soon, can we please reschedule the ultrasounds and follow up appointment to 2-3 weeks out? He lives at [facility] on [address] [facility] so [place] may be an option. Could you please contact them to reschedule?

## 2025-07-23 LAB
ANION GAP SERPL CALCULATED.3IONS-SCNC: 16 MMOL/L (ref 7–15)
BUN SERPL-MCNC: 31.2 MG/DL (ref 8–23)
CALCIUM SERPL-MCNC: 9.2 MG/DL (ref 8.8–10.4)
CHLORIDE SERPL-SCNC: 98 MMOL/L (ref 98–107)
CREAT SERPL-MCNC: 1.36 MG/DL (ref 0.67–1.17)
EGFRCR SERPLBLD CKD-EPI 2021: 53 ML/MIN/1.73M2
ERYTHROCYTE [DISTWIDTH] IN BLOOD BY AUTOMATED COUNT: 14.5 % (ref 10–15)
GLUCOSE SERPL-MCNC: 225 MG/DL (ref 70–99)
HCO3 SERPL-SCNC: 23 MMOL/L (ref 22–29)
HCT VFR BLD AUTO: 34.8 % (ref 40–53)
HGB BLD-MCNC: 11 G/DL (ref 13.3–17.7)
MCH RBC QN AUTO: 27.4 PG (ref 26.5–33)
MCHC RBC AUTO-ENTMCNC: 31.6 G/DL (ref 31.5–36.5)
MCV RBC AUTO: 87 FL (ref 78–100)
PLATELET # BLD AUTO: 188 10E3/UL (ref 150–450)
POTASSIUM SERPL-SCNC: 4.2 MMOL/L (ref 3.4–5.3)
RBC # BLD AUTO: 4.02 10E6/UL (ref 4.4–5.9)
SODIUM SERPL-SCNC: 137 MMOL/L (ref 135–145)
WBC # BLD AUTO: 7.8 10E3/UL (ref 4–11)

## 2025-07-23 PROCEDURE — 85027 COMPLETE CBC AUTOMATED: CPT | Performed by: FAMILY MEDICINE

## 2025-07-23 PROCEDURE — P9604 ONE-WAY ALLOW PRORATED TRIP: HCPCS | Performed by: FAMILY MEDICINE

## 2025-07-23 PROCEDURE — 82947 ASSAY GLUCOSE BLOOD QUANT: CPT | Performed by: FAMILY MEDICINE

## 2025-07-23 PROCEDURE — 82310 ASSAY OF CALCIUM: CPT | Performed by: FAMILY MEDICINE

## 2025-07-23 PROCEDURE — 80048 BASIC METABOLIC PNL TOTAL CA: CPT | Performed by: FAMILY MEDICINE

## 2025-07-23 PROCEDURE — 82374 ASSAY BLOOD CARBON DIOXIDE: CPT | Performed by: FAMILY MEDICINE

## 2025-07-24 ENCOUNTER — HOSPITAL ENCOUNTER (OUTPATIENT)
Dept: INTERVENTIONAL RADIOLOGY/VASCULAR | Facility: CLINIC | Age: 80
End: 2025-07-24
Attending: STUDENT IN AN ORGANIZED HEALTH CARE EDUCATION/TRAINING PROGRAM
Payer: MEDICARE

## 2025-07-24 VITALS
DIASTOLIC BLOOD PRESSURE: 58 MMHG | OXYGEN SATURATION: 95 % | RESPIRATION RATE: 22 BRPM | TEMPERATURE: 97.7 F | HEART RATE: 65 BPM | SYSTOLIC BLOOD PRESSURE: 112 MMHG

## 2025-07-24 DIAGNOSIS — R07.9 CHEST PAIN: Primary | ICD-10-CM

## 2025-07-24 DIAGNOSIS — I73.9 PAD (PERIPHERAL ARTERY DISEASE): Primary | ICD-10-CM

## 2025-07-24 DIAGNOSIS — L89.613 PRESSURE ULCER OF RIGHT HEEL, STAGE 3 (H): ICD-10-CM

## 2025-07-24 LAB
APTT PPP: 30 SECONDS (ref 22–38)
GLUCOSE BLDC GLUCOMTR-MCNC: 166 MG/DL (ref 70–99)
INR PPP: 1.15 (ref 0.85–1.15)
PROTHROMBIN TIME: 14.9 SECONDS (ref 11.8–14.8)

## 2025-07-24 PROCEDURE — 272N000500 HC NEEDLE CR2

## 2025-07-24 PROCEDURE — C1887 CATHETER, GUIDING: HCPCS

## 2025-07-24 PROCEDURE — C1769 GUIDE WIRE: HCPCS

## 2025-07-24 PROCEDURE — 258N000003 HC RX IP 258 OP 636: Performed by: STUDENT IN AN ORGANIZED HEALTH CARE EDUCATION/TRAINING PROGRAM

## 2025-07-24 PROCEDURE — 272N000121 HC CATH CR6

## 2025-07-24 PROCEDURE — 272N000566 HC SHEATH CR3

## 2025-07-24 PROCEDURE — 250N000009 HC RX 250: Performed by: STUDENT IN AN ORGANIZED HEALTH CARE EDUCATION/TRAINING PROGRAM

## 2025-07-24 PROCEDURE — 85730 THROMBOPLASTIN TIME PARTIAL: CPT | Performed by: STUDENT IN AN ORGANIZED HEALTH CARE EDUCATION/TRAINING PROGRAM

## 2025-07-24 PROCEDURE — 255N000002 HC RX 255 OP 636: Performed by: STUDENT IN AN ORGANIZED HEALTH CARE EDUCATION/TRAINING PROGRAM

## 2025-07-24 PROCEDURE — 75710 ARTERY X-RAYS ARM/LEG: CPT | Mod: RT

## 2025-07-24 PROCEDURE — 82962 GLUCOSE BLOOD TEST: CPT

## 2025-07-24 PROCEDURE — 272N000147 HC KIT CR7

## 2025-07-24 PROCEDURE — 85610 PROTHROMBIN TIME: CPT | Performed by: STUDENT IN AN ORGANIZED HEALTH CARE EDUCATION/TRAINING PROGRAM

## 2025-07-24 PROCEDURE — 36415 COLL VENOUS BLD VENIPUNCTURE: CPT | Performed by: STUDENT IN AN ORGANIZED HEALTH CARE EDUCATION/TRAINING PROGRAM

## 2025-07-24 PROCEDURE — 250N000011 HC RX IP 250 OP 636: Performed by: STUDENT IN AN ORGANIZED HEALTH CARE EDUCATION/TRAINING PROGRAM

## 2025-07-24 PROCEDURE — C1760 CLOSURE DEV, VASC: HCPCS

## 2025-07-24 RX ORDER — ACETAMINOPHEN 325 MG/1
650 TABLET ORAL EVERY 6 HOURS PRN
Status: ACTIVE | OUTPATIENT
Start: 2025-07-24

## 2025-07-24 RX ORDER — LIDOCAINE 40 MG/G
CREAM TOPICAL
Status: ACTIVE | OUTPATIENT
Start: 2025-07-24

## 2025-07-24 RX ORDER — IODIXANOL 320 MG/ML
100 INJECTION, SOLUTION INTRAVASCULAR ONCE
Status: COMPLETED | OUTPATIENT
Start: 2025-07-24 | End: 2025-07-24

## 2025-07-24 RX ORDER — NALOXONE HYDROCHLORIDE 0.4 MG/ML
0.4 INJECTION, SOLUTION INTRAMUSCULAR; INTRAVENOUS; SUBCUTANEOUS
Status: ACTIVE | OUTPATIENT
Start: 2025-07-24

## 2025-07-24 RX ORDER — HEPARIN SODIUM 200 [USP'U]/100ML
1 INJECTION, SOLUTION INTRAVENOUS EVERY 5 MIN PRN
Status: ACTIVE | OUTPATIENT
Start: 2025-07-24

## 2025-07-24 RX ORDER — FENTANYL CITRATE 50 UG/ML
25-50 INJECTION, SOLUTION INTRAMUSCULAR; INTRAVENOUS EVERY 5 MIN PRN
Refills: 0 | Status: ACTIVE | OUTPATIENT
Start: 2025-07-24

## 2025-07-24 RX ORDER — NALOXONE HYDROCHLORIDE 0.4 MG/ML
0.2 INJECTION, SOLUTION INTRAMUSCULAR; INTRAVENOUS; SUBCUTANEOUS
Status: ACTIVE | OUTPATIENT
Start: 2025-07-24

## 2025-07-24 RX ORDER — FLUMAZENIL 0.1 MG/ML
0.2 INJECTION, SOLUTION INTRAVENOUS
Status: ACTIVE | OUTPATIENT
Start: 2025-07-24

## 2025-07-24 RX ORDER — SODIUM CHLORIDE 9 MG/ML
INJECTION, SOLUTION INTRAVENOUS CONTINUOUS
Status: ACTIVE | OUTPATIENT
Start: 2025-07-24

## 2025-07-24 RX ORDER — OXYCODONE HYDROCHLORIDE 5 MG/1
5 TABLET ORAL EVERY 4 HOURS PRN
Status: ACTIVE | OUTPATIENT
Start: 2025-07-24

## 2025-07-24 RX ADMIN — HEPARIN SODIUM IN SODIUM CHLORIDE 1 BAG: 200 INJECTION INTRAVENOUS at 08:53

## 2025-07-24 RX ADMIN — MIDAZOLAM HYDROCHLORIDE 0.5 MG: 1 INJECTION, SOLUTION INTRAMUSCULAR; INTRAVENOUS at 09:07

## 2025-07-24 RX ADMIN — LIDOCAINE HYDROCHLORIDE 20 ML: 10 INJECTION, SOLUTION INFILTRATION; PERINEURAL at 09:08

## 2025-07-24 RX ADMIN — IODIXANOL 32 ML: 320 INJECTION, SOLUTION INTRAVASCULAR at 09:48

## 2025-07-24 RX ADMIN — SODIUM CHLORIDE: 0.9 INJECTION, SOLUTION INTRAVENOUS at 08:56

## 2025-07-24 RX ADMIN — FENTANYL CITRATE 25 MCG: 50 INJECTION INTRAMUSCULAR; INTRAVENOUS at 09:08

## 2025-07-24 NOTE — PRE-PROCEDURE
GENERAL PRE-PROCEDURE:   Procedure:  Right lower extremity angiogram with sedation possile intervention  Date/Time:  7/24/2025 8:36 AM    Verbal consent obtained?: Yes    Written consent obtained?: Yes    Risks and benefits: Risks, benefits and alternatives were discussed    Consent given by:  Patient  Patient states understanding of procedure being performed: Yes    Patient's understanding of procedure matches consent: Yes    Procedure consent matches procedure scheduled: Yes    Expected level of sedation:  Minimal  Appropriately NPO:  Yes  ASA Class:  3  Mallampati  :  Grade 2- soft palate, base of uvula, tonsillar pillars, and portion of posterior pharyngeal wall visible  Lungs:  Lungs clear with good breath sounds bilaterally  Heart:  A-fib  Statement of review:  I have reviewed the lab findings, diagnostic data, medications, and the plan for sedation

## 2025-07-24 NOTE — H&P
VASCULAR SURGERY CLINIC CONSULTATION    VASCULAR SURGEON: Brandie Boyd DO RPVI     LOCATION:  [facility]    Ever Crane   Medical Record #:  [id number]  YOB: 1945  Age:  80 year old     Date of Service: 7/24/2025    PRIMARY CARE PROVIDER: Sariah Harper      Reason for visit:  CLTI RLE    IMPRESSION: 80-year-old male with medical history of hypertension,, CAD status post CABG in 2000, CKD, Heart failure with EF 50 to 55%, type 2 diabetes and bilateral TMA with peripheral arterial disease now with nonhealing wound to right TMA site as well as chronic venous wounds in right lower extremity.  Recently admitted in May for cellulitis and lymphangitis of right lower extremity venous wounds now significantly clinically improved.    Patient with history of very severe infrapopliteal disease and minimal flow beyond ankle noted on angiogram by Dr. Wolfe in 2023, no revascularization options at that time.  Discussed with patient that there is again significant chance of diagnostic exam without options for repeat collateralization as he has very severe runoff disease with tibial occlusion.  Wounds to the right TMA are relatively new so I do think it is reasonable to reevaluate arterial flow in right lower extremity, patien agreeable to this.    TcPO2 in 2023 with 3 on right and 13 on left. Continues with severe PAD.     Does not seem reported chest pain this morning is cardiac in origin as patient is adamant he has had similar pain for the last 25 years and it is very different from pain experienced during previous MI.  Will have patient follow-up with primary care and discuss risk of acute cardiac event as I cannot guarantee that there is no cardiac component however patient wishes to proceed today and does voice understanding of risks.    Risks, benefits, alternatives discussion including but not limited to death, anesthetic or cardiopulmonary complications, bleeding or access site  complications, infection, dissection, distal embolization, vessel perforation or other injury, worsening ischemia with resulting limb loss, compartment syndrome, fasciotomy, and acute renal insufficiency due to contrast exposure requiring temporary or permanent dialysis.  The patient and his son understand the risks and would like to proceed with right lower extremity angiogram today.     RECOMMENDATION/RISKS: Will proceed with right lower extremity angiogram as planned with minimal sedation and follow-up with primary care with likely repeat echocardiogram.  Will use dilated contrast however will need some contrast to evaluate tibial vessels as this is the primary area of disease.    HPI:  Ever Crane is a 80 year old male who was seen today for eval prior to right leg angiogram history as above. Does note intermittent chest pain described as sharp which only lasts 1 second is occurred in left lateral chest and right lower chest which he states has occurred in similar fashion over the last 25 years.  Nonreproducible on palpation, denies shortness of breath, worsening edema, chest pressure, and pain does not radiate.  It is very different from pain he experienced during previous cardiac events and is adamant that it is not significantly changed since childhood.    REVIEW OF SYSTEMS:    A 12 point ROS was reviewed and is negative except for intermittent sharp chest pain, no chest pressure, tightness, or shortness of breath, non radiating.      PHH:    Past Medical History:   Diagnosis Date    A-fib (H)     Acute diastolic heart failure (H) 06/07/2022    Acute posthemorrhagic anemia 05/17/2022    MESHA (acute kidney injury)     Anemia     Atopic keratoconjunctivitis     Atrial fibrillation (H)     Brian Moe: 8/2011 Cardioversion; CHADS2 VASC = 5; he is on warfarin and sotalol     Atrial flutter (H)     Mcgarry's esophagus 01/01/2012    per note of Dr. Tavo Mike of [facility]    Bilateral lower leg cellulitis 08/31/2018     BPH (benign prostatic hyperplasia)     Candidiasis of perineum 01/03/2018    Carotid stenosis     Carotid stenosis, asymptomatic, right     Cellulitis     CHF (congestive heart failure) (H)     Cholecystitis 10/14/2019    Cholecystitis, acute 08/18/2019    Chronic systolic heart failure (H)     Chronic venous stasis dermatitis     Closed nondisplaced intertrochanteric fracture of left femur with routine healing, subsequent encounter 05/18/2022    Clostridium difficile colitis     Contact with and (suspected) exposure to covid-19 12/13/2021    COPD (chronic obstructive pulmonary disease) (H)     Coronary artery disease due to lipid rich plaque 2000    CABG x2    Coronary atherosclerosis     Diabetes (H)     Diabetic ulcer of both feet (H) 10/31/2017    Diabetic ulcer of toe of right foot associated with diabetes mellitus due to underlying condition, limited to breakdown of skin (H) 01/05/2023    Diabetic ulcer of toe of right foot associated with diabetes mellitus due to underlying condition, with necrosis of bone (H) 01/05/2023    Dyslexia     Dyslipidemia, goal LDL below 70 2000    Epistaxis     Essential hypertension     Gangrene of left foot (H)     GERD (gastroesophageal reflux disease)     HLD (hyperlipidemia)     HTN (hypertension)     Hyponatremia     Ischemic heart disease     Lymphedema     Mild cognitive impairment     MRSA (methicillin resistant Staphylococcus aureus)     Neuropathy     Non-STEMI (non-ST elevated myocardial infarction) (H)     Occlusion and stenosis of right carotid artery     Osteoarthrosis     Osteomyelitis of ankle and foot (H)     Other atopic dermatitis     PAD (peripheral artery disease)     Peripheral vascular disease     Pneumonia 06/26/2018    Polyneuropathy due to type 2 diabetes mellitus (H)     Pressure ulcer, heel     Bilateral    Renal insufficiency     Type 2 diabetes mellitus, without long-term current use of insulin (H)     Ulcer of right foot (H)     Unable to  function independently 11/13/2017         Past Surgical History:   Procedure Laterality Date    AMPUTATE FOOT Left 11/05/2017    Procedure: LEFT TRANSMETATARSAL AMPUTATION;  Surgeon: Ever Wick MD;  Location: [facility];  Service:     AMPUTATE FOOT Right 4/27/2023    Procedure: Transmetatarsal amputation right foot;  Surgeon: Miguelangel Spears DPM;  Location: [facility]    AMPUTATE FOOT Right 5/15/2023    Procedure: partial calcanectomy;  Surgeon: Miguelangel Spears DPM;  Location: [facility]    BYPASS GRAFT ARTERY CORONARY N/A 03/06/2000    SVG to OM1, SVG to PDA    BYPASS GRAFT ARTERY CORONARY N/A 10/04/2018    redo CABG due to graft failure    CABG MEASURES GRP      CARDIOVERSION  08/25/2011    CV CORONARY ANGIOGRAM N/A 10/01/2018    Procedure: Coronary Angiogram;  Surgeon: Miki Mac MD;  Location: [facility];  Service:     INCISION AND DRAINAGE FOOT, COMBINED Right 5/15/2023    Procedure: INCISION AND DRAINAGE, right heel with,;  Surgeon: Miguelangel Spears DPM;  Location: [facility]    INCISION AND DRAINAGE FOOT, COMBINED Bilateral 6/1/2023    Procedure: INCISION AND DRAINAGE, right foot with debridement of ulceration bilateral heels;  Surgeon: Miguelangel Spears DPM;  Location: [facility]    INGUINAL HERNIA REPAIR Bilateral 1969    and 1979    IR EXTREMITY ANGIOGRAM BILATERAL  11/3/2017    IR LOWER EXTREMITY ANGIOGRAM LEFT  3/10/2023    IR LOWER EXTREMITY ANGIOGRAM RIGHT  1/24/2023    LAPAROSCOPIC CHOLECYSTECTOMY N/A 08/18/2019    Procedure: CHOLECYSTECTOMY, LAPAROSCOPIC;  Surgeon: Stewart Fountain MD;  Location: [facility];  Service: General    LENGTHEN TENDON ACHILLES Right 4/27/2023    Procedure: with Achilles tendon lengthening, debridement of right heel ulceration.;  Surgeon: Miguelangel Spears DPM;  Location: [facility]        ALLERGIES:     Allergies   Allergen Reactions    Lanolin      Other Reaction(s): Not  "available    Cranberry Extract Itching and Rash    Doxycycline Rash    Latex Rash    Penicillin V Rash     Reaction occurred as a child. Patient tolerated Zosyn 6/2018, Cefazolin 10/2018, and has also tolerated Augmentin.    Penicillins Rash     Reaction occurred as a child. Patient tolerated Zosyn 6/2018, Cefazolin 10/2018, and has also tolerated Augmentin.        MEDS:    Current Outpatient Medications   Medication Sig Dispense Refill    acetaminophen (TYLENOL) 325 MG tablet Take 650 mg by mouth 2 times daily as needed for mild pain      albuterol (PROAIR HFA/PROVENTIL HFA/VENTOLIN HFA) 108 (90 Base) MCG/ACT inhaler Inhale 2 puffs into the lungs 2 times daily as needed      Bismuth Tribromoph-Petrolatum (XEROFORM PETROLAT GAUZE 5\"X9\") MISC Externally apply 5 each topically daily. 150 each 0    cetirizine (ZYRTEC) 5 MG tablet Take 5 mg by mouth daily      clopidogrel (PLAVIX) 75 MG tablet Take 75 mg by mouth daily      clotrimazole-betamethasone (LOTRISONE) 1-0.05 % external cream Apply topically 2 times daily      furosemide (LASIX) 40 MG tablet Take 40 mg by mouth daily      gabapentin (NEURONTIN) 100 MG capsule Take 200 mg by mouth 2 times daily      glimepiride (AMARYL) 1 MG tablet Take 1 mg by mouth every morning (before breakfast)      hypromellose (ARTIFICIAL TEARS) 0.5 % SOLN ophthalmic solution Place 1 drop Into the left eye 4 times daily as needed for dry eyes      ketoconazole (NIZORAL) 2 % external shampoo Apply topically twice a week Mon and Fri.      losartan (COZAAR) 25 MG tablet Take 12.5 mg by mouth daily      naloxone (NARCAN) 0.4 MG/ML injection Inject 0.1-0.4 mg into the muscle once as needed for opioid reversal      pantoprazole (PROTONIX) 40 MG EC tablet Take 1 tablet (40 mg) by mouth daily. 30 tablet 0    rivaroxaban ANTICOAGULANT (XARELTO) 15 MG TABS tablet Take 1 tablet (15 mg) by mouth daily. 30 tablet 0    simvastatin (ZOCOR) 40 MG tablet Take 40 mg by mouth At Bedtime      spironolactone " (ALDACTONE) 25 MG tablet Take 25 mg by mouth daily      tamsulosin (FLOMAX) 0.4 MG capsule Take 0.4 mg by mouth daily      Wound Cleansers (VASHE WOUND) 0.033 % SOLN Externally apply 1 Application topically daily. 1000 mL 0     Current Facility-Administered Medications   Medication Dose Route Frequency Provider Last Rate Last Admin    heparin 2 Units/mL in 0.9% NaCl (500 mL)  1 Bag TABLE SOLN Q5 Min PRN Andrez Boydra, DO        lidocaine (LMX4) cream   Topical Q1H PRN MercelAguedaBrandie, DO        lidocaine 1 % 0.1-1 mL  0.1-1 mL Other Q1H PRN Mercel, Brandie, DO        lidocaine 1 % 20 mL  20 mL Intradermal Once Brandie Boyd, DO        sodium chloride (PF) 0.9% PF flush 3 mL  3 mL Intracatheter Q8H MARCIA Brandie Boyd, DO        sodium chloride (PF) 0.9% PF flush 3 mL  3 mL Intracatheter q1 min prn Brandie Boyd, DO        sodium chloride 0.9 % infusion   Intravenous Continuous Brandie Boyd, DO            SOCIAL HABITS:    Tobacco Use      Smoking status: Former        Packs/day: 0.00        Years: 1 pack/day for 36.0 years (36.0 ttl pk-yrs)        Types: Cigarettes        Start date: 1964        Quit date: 2000        Years since quittin.2      Smokeless tobacco: Never       Alcohol Use: Not on file       History   Drug Use No        FAMILY HISTORY:    Family History   Problem Relation Age of Onset    Sudden Death Mother 85.00    CABG Father     Valvular heart disease Father     Esophageal Cancer Father 58.00        cause of death    No Known Problems Son     No Known Problems Sister     Obesity Sister     Osteoarthritis Sister     No Known Problems Sister     No Known Problems Grandchild     No Known Problems Grandchild        PE:  /68   Pulse 73   Temp 97.7  F (36.5  C) (Temporal)   Resp 17   SpO2 94%   Wt Readings from Last 1 Encounters:   25 79.8 kg (176 lb)     There is no height or weight on file to calculate BMI.    EXAM:  GENERAL: This is a well-developed  80 year old male who appears his stated age  EYES: Grossly normal.  CARDIAC:  RRR  CHEST/LUNG:  normal work of breathing on room air  GASTROINTESINAL (ABDOMEN):soft, non tender, non distended  MUSCULOSKELETAL: Grossly normal and both lower extremities are intact.  HEME/LYMPH: No lymphedema  NEUROLOGIC: Focally intact, Alert and oriented x 3.   PSYCH: appropriate affect  INTEGUMENT: Wounds slightly improved on right TMA site  Pulse Exam: 2+ femoral bilaterally, monophasic Dp and PT bilaterally, DP stronger than PT in RLE          30 minutes spent on the day of encounter doing chart review, history and exam, documentation, and further activities as noted.     Brandie Boyd DO, VI  VASCULAR SURGERY

## 2025-07-24 NOTE — PROGRESS NOTES
Vascular surgery     Plan right leg angiogram for CLTI with wounds  C arm pt right   MD patient left  Prep bilateral groins   No power injector   Planning tibial intervention     Brandie Boyd DO [facility]  Vascular Surgery

## 2025-07-24 NOTE — IR NOTE
Patient Name: Ever Crane  Medical Record Number: [id number]  Today's Date: 7/24/2025    Procedure: Image guided R lower extremity angiogram with possible intervention and moderate sedation   Proceduralist: Dr. Boyd    Procedure Start: 0904  Procedure end: 0943  Sedation medications administered: 0.5 mg midazolam and 25 mcg fentanyl   Sedation time: 39 minutes    Report given to: n/a  : n/a    Other Notes: Pt arrived to IR room 1 from home facility. Confirmed with MARION Matute at facility that patient's Xarelto has been held for 24 hours. Consent reviewed. Pt complained of chest pain to MD; MD declined ECG and pt asked to proceed with procedure, which MD okayed and advised pt to f/u with cardiology after as OP. Per MD, will utilize minimal sedation. Pt denies any questions or concerns regarding procedure. Pt positioned supine and monitored per protocol. Pt tolerated procedure without any noted complications. VSS on 2L NC during procedure and RA post procedure. Pt transferred back to Pre/post bay 1. No evidence of hematoma or bleeding. Discharge teaching provided; questions asked and answered to pt and son satisfaction. Confirmed all belongings accounted for and wheeled to main entrance and transported home by son. Called facility multiple times attempting to speak with RN to advise they restart Xarelto tomorrow; have left messages with unit manager and  asking for a call back as unable to get a hold of nurses' station staff.    Update: nurse Archer called back; advised to restart Xarelto tomorrow and that all other discharge instructions were sent in packet with patient.       Angioseal 6F lot#[id number]    Bedrest end 11:45

## 2025-07-24 NOTE — PROCEDURES
[facility]    Procedure: IR Procedure    Date/Time: 7/24/2025 9:57 AM    Performed by: Brandie Boyd DO  Authorized by: Brandie Boyd DO  IR Fellow Physician:    Pre Procedure Diagnosis: CLTI right leg with tissue loss  Post Procedure Diagnosis: same    UNIVERSAL PROTOCOL   Site Marked: Yes  Prior Images Obtained and Reviewed:  Yes  Required items: Required blood products, implants, devices and special equipment available    Patient identity confirmed:  Verbally with patient, provided demographic data and hospital-assigned identification number  Patient was reevaluated immediately before administering moderate or deep sedation or anesthesia  Confirmation Checklist:  Patient's identity using two indicators, relevant allergies, procedure was appropriate and matched the consent or emergent situation and correct equipment/implants were available  Time out: Immediately prior to the procedure a time out was called    Universal Protocol: the Joint Commission Universal Protocol was followed    Preparation: Patient was prepped and draped in usual sterile fashion       ANESTHESIA    Anesthesia:  Local infiltration  Local Anesthetic:  Lidocaine 1% without epinephrine      SEDATION  Patient Sedated: Yes    Sedation:  Fentanyl and midazolam  Vital signs: Vital signs monitored during sedation    Fluoroscopy Time: 7 minute(s)  See dictated procedure note for full details.  Findings: Single vessel runoff via AT which occludes at ankle and significant collateral flow through foot, stable from exam in 2023    Specimens: none    Procedural Complications: None    Condition: Stable    Plan: Follow up with Dr Spears as planned, follow up vascular surgery in 6 weeks repeat non invasive studies      PROCEDURE    Patient Tolerance:  Patient tolerated the procedure well with no immediate complications  Length of time physician/provider present for 1:1 monitoring during sedation:  38-52 min   Fentanyl: 25  mcg  Versed 0.5mg  Contrast: 32cc  Flouro: 6.8 min

## 2025-07-24 NOTE — TELEPHONE ENCOUNTER
Follow up appointment rescheduled to 8/12/25 with ultrasounds.  Confirmed with pt's nephew who will be providing transportation for pt.

## 2025-07-24 NOTE — DISCHARGE INSTRUCTIONS
Angiogram Discharge Instructions:    You had an angiogram procedure. An angiogram is a procedure thatuses x-rays to take pictures of your blood vessels. A long, flexible tube or catheter is inserted through the blood stream (through the procedure site) to help deliver contrast (dye) into the arteries so they can be visibleon the x-ray. Angiograms are used to evaluate and treat possible blockages or other disease in the arterial system. Please follow the below instructions after your angiogram, including monitoring of your procedure site.    Care instructions after angiogram procedure:    - If you received sedation for your procedure, do not drive or operate heavy machinery for the rest of the day.    - Do not lift objects greater than 10 poundsfor 2 days following angiogram procedure.    - Avoid excessive exercise and straining for 2 days.    - Avoid tub baths, pools, hot tubs and Jacuzzis for 3 days or until procedure site is well healed.    - Youmay shower beginning tomorrow. Do not scrub procedure site until well healed; pat dry.    - Return to your normal activities as you tolerate after the 2 day restriction.    - You can expect to return to work 1-2days after your procedure - depending on the nature of your profession.    - It is normal to have some tenderness and minimal swelling at procedure puncture site. A small area of discoloration may be present.Tenderness typically subsides in 1-2 days. A small knot may also be present at puncture site for 6-8 weeks. This can be a normal part of the healing process.    Medications and other post-procedure care:    -If you had a blockage opened please make sure to fill your prescription for any new medication, such as Plavix, that you may have been prescribed and take it everyday    - If you have kidney function issues, please makesure to hydrate yourself well for the next two days by drinking lots of fluids to help clear your body of the dye used. If you have heart  problems such as heart failure, this may have to be moderated.    - If you are onMetformin, please do not resume it for 48hrs.    Follow up:    - Follow up with your vascular surgery team at the [facility] vascular center A follow up appointment should already be arranged for you.If you are unsure of your appointment or do not have a follow up appointment in the next 2-3 weeks, please call our office at [phone]. You will need to have an ultrasound 2-3 weeks after your angiogram and should bescheduled at the time of your follow up appt.    Further follow up will be based on ultrasound results. Typical follow up is every 3 months for the first year, then every 6 months to one year thereafter.    seek medical evaluation for:    - If you develop fevers (greater than 101 F (38.3C)).    - If you develop increasing pain, redness, purulent drainage, tenderness, or swelling at procedure site.    If you experience any bleeding from procedure/puncture site: lie down, firmly apply pressure to puncture site and call 911.    - Seek emergent evaluation if you experience any new leg/arm pain, discoloration ornumbness.    Call the vascular center at [phone] with questions/concerns or if you have any of the above symptoms.

## 2025-07-29 ENCOUNTER — TELEPHONE (OUTPATIENT)
Dept: VASCULAR SURGERY | Facility: CLINIC | Age: 80
End: 2025-07-29
Payer: MEDICARE

## 2025-07-29 NOTE — TELEPHONE ENCOUNTER
LVM with [facility] that 8/5 appt is not needed. Asked for a call back to confirm they received the message. Appt was cancelled. [phone]  __________________________________  ----- Message from Heather SALEH sent at 7/29/2025  4:06 PM CDT -----  Regarding: cancel 8/5 appt  8/5 appt with Pam can be canceled, patient is seeing Dr Boyd on 8/12, thanks!

## 2025-08-08 ENCOUNTER — MEDICAL CORRESPONDENCE (OUTPATIENT)
Dept: HEALTH INFORMATION MANAGEMENT | Facility: CLINIC | Age: 80
End: 2025-08-08
Payer: MEDICARE

## 2025-08-08 ENCOUNTER — HOSPITAL ENCOUNTER (INPATIENT)
Facility: HOSPITAL | Age: 80
DRG: 853 | End: 2025-08-08
Attending: STUDENT IN AN ORGANIZED HEALTH CARE EDUCATION/TRAINING PROGRAM
Payer: MEDICARE

## 2025-08-08 DIAGNOSIS — T14.8XXA OPEN WOUND: ICD-10-CM

## 2025-08-08 DIAGNOSIS — M86.171 OTHER ACUTE OSTEOMYELITIS OF RIGHT FOOT (H): ICD-10-CM

## 2025-08-08 DIAGNOSIS — G63 POLYNEUROPATHY IN DISEASES CLASSIFIED ELSEWHERE: ICD-10-CM

## 2025-08-08 DIAGNOSIS — R23.9 ALTERATION IN SKIN INTEGRITY: ICD-10-CM

## 2025-08-08 DIAGNOSIS — I48.19 PERSISTENT ATRIAL FIBRILLATION (H): Chronic | ICD-10-CM

## 2025-08-08 DIAGNOSIS — A41.9 SEPSIS WITHOUT ACUTE ORGAN DYSFUNCTION, DUE TO UNSPECIFIED ORGANISM (H): Primary | ICD-10-CM

## 2025-08-08 DIAGNOSIS — L29.9 ITCHING: ICD-10-CM

## 2025-08-08 DIAGNOSIS — I83.018 VENOUS STASIS ULCER OF OTHER PART OF RIGHT LOWER LEG LIMITED TO BREAKDOWN OF SKIN, UNSPECIFIED WHETHER VARICOSE VEINS PRESENT (H): ICD-10-CM

## 2025-08-08 DIAGNOSIS — L97.811 VENOUS STASIS ULCER OF OTHER PART OF RIGHT LOWER LEG LIMITED TO BREAKDOWN OF SKIN, UNSPECIFIED WHETHER VARICOSE VEINS PRESENT (H): ICD-10-CM

## 2025-08-08 DIAGNOSIS — L03.115 CELLULITIS OF RIGHT LOWER EXTREMITY: ICD-10-CM

## 2025-08-08 LAB
BASOPHILS # BLD AUTO: 0.1 10E3/UL (ref 0–0.2)
BASOPHILS NFR BLD AUTO: 0 %
EOSINOPHIL # BLD AUTO: 0 10E3/UL (ref 0–0.7)
EOSINOPHIL NFR BLD AUTO: 0 %
ERYTHROCYTE [DISTWIDTH] IN BLOOD BY AUTOMATED COUNT: 14.7 % (ref 10–15)
HCT VFR BLD AUTO: 41.6 % (ref 40–53)
HGB BLD-MCNC: 13.3 G/DL (ref 13.3–17.7)
IMM GRANULOCYTES # BLD: 0.2 10E3/UL
IMM GRANULOCYTES NFR BLD: 1 %
LYMPHOCYTES # BLD AUTO: 0.6 10E3/UL (ref 0.8–5.3)
LYMPHOCYTES NFR BLD AUTO: 2 %
MAGNESIUM SERPL-MCNC: 1.5 MG/DL (ref 1.7–2.3)
MCH RBC QN AUTO: 27.2 PG (ref 26.5–33)
MCHC RBC AUTO-ENTMCNC: 32 G/DL (ref 31.5–36.5)
MCV RBC AUTO: 85 FL (ref 78–100)
MONOCYTES # BLD AUTO: 1 10E3/UL (ref 0–1.3)
MONOCYTES NFR BLD AUTO: 4 %
NEUTROPHILS # BLD AUTO: 23.8 10E3/UL (ref 1.6–8.3)
NEUTROPHILS NFR BLD AUTO: 93 %
NRBC # BLD AUTO: 0 10E3/UL
NRBC BLD AUTO-RTO: 0 /100
PLATELET # BLD AUTO: 244 10E3/UL (ref 150–450)
RBC # BLD AUTO: 4.89 10E6/UL (ref 4.4–5.9)
WBC # BLD AUTO: 25.6 10E3/UL (ref 4–11)

## 2025-08-08 PROCEDURE — 99285 EMERGENCY DEPT VISIT HI MDM: CPT | Mod: 25 | Performed by: STUDENT IN AN ORGANIZED HEALTH CARE EDUCATION/TRAINING PROGRAM

## 2025-08-08 PROCEDURE — 83735 ASSAY OF MAGNESIUM: CPT | Performed by: STUDENT IN AN ORGANIZED HEALTH CARE EDUCATION/TRAINING PROGRAM

## 2025-08-08 PROCEDURE — 99291 CRITICAL CARE FIRST HOUR: CPT | Mod: 25

## 2025-08-08 PROCEDURE — 36415 COLL VENOUS BLD VENIPUNCTURE: CPT | Performed by: STUDENT IN AN ORGANIZED HEALTH CARE EDUCATION/TRAINING PROGRAM

## 2025-08-08 PROCEDURE — 85004 AUTOMATED DIFF WBC COUNT: CPT | Performed by: STUDENT IN AN ORGANIZED HEALTH CARE EDUCATION/TRAINING PROGRAM

## 2025-08-08 PROCEDURE — 99285 EMERGENCY DEPT VISIT HI MDM: CPT | Mod: 25

## 2025-08-08 ASSESSMENT — ACTIVITIES OF DAILY LIVING (ADL): ADLS_ACUITY_SCORE: 59

## 2025-08-09 ENCOUNTER — APPOINTMENT (OUTPATIENT)
Dept: CT IMAGING | Facility: HOSPITAL | Age: 80
DRG: 853 | End: 2025-08-09
Attending: STUDENT IN AN ORGANIZED HEALTH CARE EDUCATION/TRAINING PROGRAM
Payer: MEDICARE

## 2025-08-09 ENCOUNTER — APPOINTMENT (OUTPATIENT)
Dept: PHYSICAL THERAPY | Facility: HOSPITAL | Age: 80
DRG: 853 | End: 2025-08-09
Attending: HOSPITALIST
Payer: MEDICARE

## 2025-08-09 PROBLEM — I25.728 CORONARY ARTERY DISEASE OF AUTOLOGOUS BYPASS GRAFT WITH STABLE ANGINA PECTORIS: Chronic | Status: ACTIVE | Noted: 2022-03-14

## 2025-08-09 PROBLEM — A41.9 SEPSIS WITHOUT ACUTE ORGAN DYSFUNCTION, DUE TO UNSPECIFIED ORGANISM (H): Status: ACTIVE | Noted: 2025-08-09

## 2025-08-09 LAB
ALBUMIN SERPL BCG-MCNC: 4 G/DL (ref 3.5–5.2)
ALBUMIN UR-MCNC: 30 MG/DL
ALP SERPL-CCNC: 104 U/L (ref 40–150)
ALT SERPL W P-5'-P-CCNC: 10 U/L (ref 0–70)
ANION GAP SERPL CALCULATED.3IONS-SCNC: 20 MMOL/L (ref 7–15)
APPEARANCE UR: CLEAR
AST SERPL W P-5'-P-CCNC: 47 U/L (ref 0–45)
BASE EXCESS BLDA CALC-SCNC: 1.7 MMOL/L (ref -3–3)
BILIRUB SERPL-MCNC: 1.2 MG/DL
BILIRUB UR QL STRIP: NEGATIVE
BUN SERPL-MCNC: 32.8 MG/DL (ref 8–23)
CALCIUM SERPL-MCNC: 10.1 MG/DL (ref 8.8–10.4)
CHLORIDE SERPL-SCNC: 95 MMOL/L (ref 98–107)
CK SERPL-CCNC: 1587 U/L (ref 39–308)
COLOR UR AUTO: YELLOW
CREAT SERPL-MCNC: 1.52 MG/DL (ref 0.67–1.17)
EGFRCR SERPLBLD CKD-EPI 2021: 46 ML/MIN/1.73M2
GLUCOSE BLDC GLUCOMTR-MCNC: 236 MG/DL (ref 70–99)
GLUCOSE BLDC GLUCOMTR-MCNC: 246 MG/DL (ref 70–99)
GLUCOSE BLDC GLUCOMTR-MCNC: 253 MG/DL (ref 70–99)
GLUCOSE BLDC GLUCOMTR-MCNC: 256 MG/DL (ref 70–99)
GLUCOSE BLDC GLUCOMTR-MCNC: 290 MG/DL (ref 70–99)
GLUCOSE SERPL-MCNC: 262 MG/DL (ref 70–99)
GLUCOSE UR STRIP-MCNC: NEGATIVE MG/DL
HCO3 BLD-SCNC: 25 MMOL/L (ref 21–28)
HCO3 SERPL-SCNC: 22 MMOL/L (ref 22–29)
HGB UR QL STRIP: ABNORMAL
HYALINE CASTS: 3 /LPF
KETONES UR STRIP-MCNC: NEGATIVE MG/DL
LACTATE SERPL-SCNC: 2.5 MMOL/L (ref 0.7–2)
LACTATE SERPL-SCNC: 3.5 MMOL/L (ref 0.7–2)
LACTATE SERPL-SCNC: 3.8 MMOL/L (ref 0.7–2)
LACTATE SERPL-SCNC: 4 MMOL/L (ref 0.7–2)
LEUKOCYTE ESTERASE UR QL STRIP: NEGATIVE
MRSA DNA SPEC QL NAA+PROBE: NEGATIVE
NITRATE UR QL: NEGATIVE
NT-PROBNP SERPL-MCNC: 2931 PG/ML (ref 0–852)
NT-PROBNP SERPL-MCNC: 3930 PG/ML (ref 0–852)
O2/TOTAL GAS SETTING VFR VENT: 28 %
OXYHGB MFR BLDA: 97 % (ref 92–100)
PCO2 BLD: 34 MM HG (ref 35–45)
PH BLD: 7.48 [PH] (ref 7.35–7.45)
PH UR STRIP: 5.5 [PH] (ref 5–7)
PO2 BLD: 115 MM HG (ref 80–105)
POTASSIUM SERPL-SCNC: 5.1 MMOL/L (ref 3.4–5.3)
PROCALCITONIN SERPL IA-MCNC: 0.78 NG/ML
PROT SERPL-MCNC: 9 G/DL (ref 6.4–8.3)
RBC URINE: 6 /HPF
SA TARGET DNA: NEGATIVE
SAO2 % BLDA: 97.7 % (ref 95–96)
SODIUM SERPL-SCNC: 137 MMOL/L (ref 135–145)
SP GR UR STRIP: 1.02 (ref 1–1.03)
SQUAMOUS EPITHELIAL: <1 /HPF
TROPONIN T SERPL HS-MCNC: 73 NG/L
TROPONIN T SERPL HS-MCNC: 99 NG/L
UROBILINOGEN UR STRIP-MCNC: NORMAL MG/DL
WBC URINE: 3 /HPF

## 2025-08-09 PROCEDURE — 81001 URINALYSIS AUTO W/SCOPE: CPT | Performed by: STUDENT IN AN ORGANIZED HEALTH CARE EDUCATION/TRAINING PROGRAM

## 2025-08-09 PROCEDURE — 87040 BLOOD CULTURE FOR BACTERIA: CPT | Performed by: STUDENT IN AN ORGANIZED HEALTH CARE EDUCATION/TRAINING PROGRAM

## 2025-08-09 PROCEDURE — 250N000013 HC RX MED GY IP 250 OP 250 PS 637: Performed by: HOSPITALIST

## 2025-08-09 PROCEDURE — 83605 ASSAY OF LACTIC ACID: CPT | Performed by: STUDENT IN AN ORGANIZED HEALTH CARE EDUCATION/TRAINING PROGRAM

## 2025-08-09 PROCEDURE — 87641 MR-STAPH DNA AMP PROBE: CPT | Performed by: HOSPITALIST

## 2025-08-09 PROCEDURE — 83605 ASSAY OF LACTIC ACID: CPT | Performed by: INTERNAL MEDICINE

## 2025-08-09 PROCEDURE — 999N000104 CT LUMBAR SPINE RECONSTRUCTED

## 2025-08-09 PROCEDURE — 250N000011 HC RX IP 250 OP 636: Performed by: STUDENT IN AN ORGANIZED HEALTH CARE EDUCATION/TRAINING PROGRAM

## 2025-08-09 PROCEDURE — 70450 CT HEAD/BRAIN W/O DYE: CPT

## 2025-08-09 PROCEDURE — 36415 COLL VENOUS BLD VENIPUNCTURE: CPT | Performed by: STUDENT IN AN ORGANIZED HEALTH CARE EDUCATION/TRAINING PROGRAM

## 2025-08-09 PROCEDURE — 82805 BLOOD GASES W/O2 SATURATION: CPT | Performed by: INTERNAL MEDICINE

## 2025-08-09 PROCEDURE — 250N000011 HC RX IP 250 OP 636: Performed by: HOSPITALIST

## 2025-08-09 PROCEDURE — 96365 THER/PROPH/DIAG IV INF INIT: CPT | Mod: 59

## 2025-08-09 PROCEDURE — 258N000003 HC RX IP 258 OP 636: Performed by: STUDENT IN AN ORGANIZED HEALTH CARE EDUCATION/TRAINING PROGRAM

## 2025-08-09 PROCEDURE — 250N000012 HC RX MED GY IP 250 OP 636 PS 637: Performed by: HOSPITALIST

## 2025-08-09 PROCEDURE — 36415 COLL VENOUS BLD VENIPUNCTURE: CPT | Performed by: INTERNAL MEDICINE

## 2025-08-09 PROCEDURE — 999N000104 CT THORACIC SPINE RECONSTRUCTED

## 2025-08-09 PROCEDURE — 83880 ASSAY OF NATRIURETIC PEPTIDE: CPT | Performed by: STUDENT IN AN ORGANIZED HEALTH CARE EDUCATION/TRAINING PROGRAM

## 2025-08-09 PROCEDURE — 120N000001 HC R&B MED SURG/OB

## 2025-08-09 PROCEDURE — 84484 ASSAY OF TROPONIN QUANT: CPT | Performed by: STUDENT IN AN ORGANIZED HEALTH CARE EDUCATION/TRAINING PROGRAM

## 2025-08-09 PROCEDURE — 96374 THER/PROPH/DIAG INJ IV PUSH: CPT | Mod: 59

## 2025-08-09 PROCEDURE — 97162 PT EVAL MOD COMPLEX 30 MIN: CPT | Mod: GP

## 2025-08-09 PROCEDURE — 94640 AIRWAY INHALATION TREATMENT: CPT | Mod: 76

## 2025-08-09 PROCEDURE — 71260 CT THORAX DX C+: CPT

## 2025-08-09 PROCEDURE — 83880 ASSAY OF NATRIURETIC PEPTIDE: CPT | Performed by: INTERNAL MEDICINE

## 2025-08-09 PROCEDURE — 250N000013 HC RX MED GY IP 250 OP 250 PS 637: Performed by: INTERNAL MEDICINE

## 2025-08-09 PROCEDURE — 97530 THERAPEUTIC ACTIVITIES: CPT | Mod: GP

## 2025-08-09 PROCEDURE — 80053 COMPREHEN METABOLIC PANEL: CPT | Performed by: STUDENT IN AN ORGANIZED HEALTH CARE EDUCATION/TRAINING PROGRAM

## 2025-08-09 PROCEDURE — 72125 CT NECK SPINE W/O DYE: CPT

## 2025-08-09 PROCEDURE — 250N000009 HC RX 250: Performed by: HOSPITALIST

## 2025-08-09 PROCEDURE — 82962 GLUCOSE BLOOD TEST: CPT

## 2025-08-09 PROCEDURE — 84145 PROCALCITONIN (PCT): CPT | Performed by: STUDENT IN AN ORGANIZED HEALTH CARE EDUCATION/TRAINING PROGRAM

## 2025-08-09 PROCEDURE — 999N000157 HC STATISTIC RCP TIME EA 10 MIN

## 2025-08-09 PROCEDURE — 99223 1ST HOSP IP/OBS HIGH 75: CPT | Performed by: HOSPITALIST

## 2025-08-09 PROCEDURE — 82550 ASSAY OF CK (CPK): CPT | Performed by: INTERNAL MEDICINE

## 2025-08-09 PROCEDURE — 96375 TX/PRO/DX INJ NEW DRUG ADDON: CPT

## 2025-08-09 PROCEDURE — 258N000003 HC RX IP 258 OP 636: Performed by: INTERNAL MEDICINE

## 2025-08-09 PROCEDURE — 94640 AIRWAY INHALATION TREATMENT: CPT

## 2025-08-09 PROCEDURE — 36600 WITHDRAWAL OF ARTERIAL BLOOD: CPT

## 2025-08-09 RX ORDER — CETIRIZINE HYDROCHLORIDE 5 MG/1
5 TABLET ORAL DAILY
COMMUNITY

## 2025-08-09 RX ORDER — FUROSEMIDE 20 MG/1
40 TABLET ORAL DAILY
Status: DISCONTINUED | OUTPATIENT
Start: 2025-08-09 | End: 2025-08-12

## 2025-08-09 RX ORDER — ACETAMINOPHEN 325 MG/1
650 TABLET ORAL EVERY 4 HOURS PRN
Status: DISCONTINUED | OUTPATIENT
Start: 2025-08-09 | End: 2025-08-11

## 2025-08-09 RX ORDER — OLANZAPINE 10 MG/2ML
2.5 INJECTION, POWDER, FOR SOLUTION INTRAMUSCULAR EVERY 6 HOURS PRN
Status: DISCONTINUED | OUTPATIENT
Start: 2025-08-09 | End: 2025-08-15 | Stop reason: HOSPADM

## 2025-08-09 RX ORDER — NICOTINE POLACRILEX 4 MG
15-30 LOZENGE BUCCAL
Status: DISCONTINUED | OUTPATIENT
Start: 2025-08-09 | End: 2025-08-15 | Stop reason: HOSPADM

## 2025-08-09 RX ORDER — IPRATROPIUM BROMIDE AND ALBUTEROL SULFATE 2.5; .5 MG/3ML; MG/3ML
3 SOLUTION RESPIRATORY (INHALATION)
Status: DISCONTINUED | OUTPATIENT
Start: 2025-08-09 | End: 2025-08-10

## 2025-08-09 RX ORDER — ALBUTEROL SULFATE 0.83 MG/ML
2.5 SOLUTION RESPIRATORY (INHALATION) EVERY 4 HOURS PRN
Status: DISCONTINUED | OUTPATIENT
Start: 2025-08-09 | End: 2025-08-09

## 2025-08-09 RX ORDER — CLOPIDOGREL BISULFATE 75 MG/1
75 TABLET ORAL DAILY
Status: DISCONTINUED | OUTPATIENT
Start: 2025-08-09 | End: 2025-08-15 | Stop reason: HOSPADM

## 2025-08-09 RX ORDER — ACETAMINOPHEN 650 MG/1
650 SUPPOSITORY RECTAL EVERY 4 HOURS PRN
Status: DISCONTINUED | OUTPATIENT
Start: 2025-08-09 | End: 2025-08-11

## 2025-08-09 RX ORDER — AMOXICILLIN 250 MG
2 CAPSULE ORAL 2 TIMES DAILY PRN
Status: DISCONTINUED | OUTPATIENT
Start: 2025-08-09 | End: 2025-08-15 | Stop reason: HOSPADM

## 2025-08-09 RX ORDER — PIPERACILLIN SODIUM, TAZOBACTAM SODIUM 3; .375 G/15ML; G/15ML
3.38 INJECTION, POWDER, LYOPHILIZED, FOR SOLUTION INTRAVENOUS ONCE
Status: COMPLETED | OUTPATIENT
Start: 2025-08-09 | End: 2025-08-09

## 2025-08-09 RX ORDER — IOPAMIDOL 755 MG/ML
75 INJECTION, SOLUTION INTRAVASCULAR ONCE
Status: COMPLETED | OUTPATIENT
Start: 2025-08-09 | End: 2025-08-09

## 2025-08-09 RX ORDER — ALBUTEROL SULFATE 90 UG/1
2 INHALANT RESPIRATORY (INHALATION) EVERY 4 HOURS PRN
Status: DISCONTINUED | OUTPATIENT
Start: 2025-08-09 | End: 2025-08-15 | Stop reason: HOSPADM

## 2025-08-09 RX ORDER — ONDANSETRON 2 MG/ML
4 INJECTION INTRAMUSCULAR; INTRAVENOUS EVERY 6 HOURS PRN
Status: DISCONTINUED | OUTPATIENT
Start: 2025-08-09 | End: 2025-08-15 | Stop reason: HOSPADM

## 2025-08-09 RX ORDER — CALCIUM CARBONATE 500 MG/1
1000 TABLET, CHEWABLE ORAL 4 TIMES DAILY PRN
Status: DISCONTINUED | OUTPATIENT
Start: 2025-08-09 | End: 2025-08-15 | Stop reason: HOSPADM

## 2025-08-09 RX ORDER — CEFAZOLIN SODIUM 1 G/50ML
2000 SOLUTION INTRAVENOUS ONCE
Status: COMPLETED | OUTPATIENT
Start: 2025-08-09 | End: 2025-08-09

## 2025-08-09 RX ORDER — SIMVASTATIN 40 MG
40 TABLET ORAL AT BEDTIME
Status: DISCONTINUED | OUTPATIENT
Start: 2025-08-09 | End: 2025-08-15 | Stop reason: HOSPADM

## 2025-08-09 RX ORDER — PANTOPRAZOLE SODIUM 40 MG/1
40 TABLET, DELAYED RELEASE ORAL DAILY
Status: DISCONTINUED | OUTPATIENT
Start: 2025-08-09 | End: 2025-08-15 | Stop reason: HOSPADM

## 2025-08-09 RX ORDER — DEXTROSE MONOHYDRATE 25 G/50ML
25-50 INJECTION, SOLUTION INTRAVENOUS
Status: DISCONTINUED | OUTPATIENT
Start: 2025-08-09 | End: 2025-08-15 | Stop reason: HOSPADM

## 2025-08-09 RX ORDER — MAGNESIUM SULFATE HEPTAHYDRATE 40 MG/ML
2 INJECTION, SOLUTION INTRAVENOUS ONCE
Status: COMPLETED | OUTPATIENT
Start: 2025-08-09 | End: 2025-08-09

## 2025-08-09 RX ORDER — CEFAZOLIN SODIUM 1 G/50ML
2500 SOLUTION INTRAVENOUS ONCE
Status: DISCONTINUED | OUTPATIENT
Start: 2025-08-09 | End: 2025-08-09

## 2025-08-09 RX ORDER — SPIRONOLACTONE 25 MG/1
25 TABLET ORAL DAILY
Status: DISCONTINUED | OUTPATIENT
Start: 2025-08-09 | End: 2025-08-15 | Stop reason: HOSPADM

## 2025-08-09 RX ORDER — GLIMEPIRIDE 1 MG/1
1 TABLET ORAL
Status: DISCONTINUED | OUTPATIENT
Start: 2025-08-10 | End: 2025-08-15 | Stop reason: HOSPADM

## 2025-08-09 RX ORDER — VANCOMYCIN HYDROCHLORIDE 1 G/200ML
1000 INJECTION, SOLUTION INTRAVENOUS EVERY 24 HOURS
Status: DISCONTINUED | OUTPATIENT
Start: 2025-08-10 | End: 2025-08-11

## 2025-08-09 RX ORDER — AMOXICILLIN 250 MG
1 CAPSULE ORAL 2 TIMES DAILY PRN
Status: DISCONTINUED | OUTPATIENT
Start: 2025-08-09 | End: 2025-08-15 | Stop reason: HOSPADM

## 2025-08-09 RX ORDER — ONDANSETRON 4 MG/1
4 TABLET, ORALLY DISINTEGRATING ORAL EVERY 6 HOURS PRN
Status: DISCONTINUED | OUTPATIENT
Start: 2025-08-09 | End: 2025-08-15 | Stop reason: HOSPADM

## 2025-08-09 RX ORDER — DILTIAZEM HYDROCHLORIDE 5 MG/ML
15 INJECTION INTRAVENOUS ONCE
Status: COMPLETED | OUTPATIENT
Start: 2025-08-09 | End: 2025-08-09

## 2025-08-09 RX ORDER — CLOTRIMAZOLE AND BETAMETHASONE DIPROPIONATE 10; .64 MG/G; MG/G
CREAM TOPICAL
COMMUNITY

## 2025-08-09 RX ORDER — GUAIFENESIN 200 MG/10ML
200 LIQUID ORAL EVERY 4 HOURS PRN
Status: DISCONTINUED | OUTPATIENT
Start: 2025-08-09 | End: 2025-08-15 | Stop reason: HOSPADM

## 2025-08-09 RX ORDER — LIDOCAINE 40 MG/G
CREAM TOPICAL
Status: DISCONTINUED | OUTPATIENT
Start: 2025-08-09 | End: 2025-08-15 | Stop reason: HOSPADM

## 2025-08-09 RX ORDER — SODIUM CHLORIDE 9 MG/ML
INJECTION, SOLUTION INTRAVENOUS CONTINUOUS
Status: DISCONTINUED | OUTPATIENT
Start: 2025-08-09 | End: 2025-08-10

## 2025-08-09 RX ORDER — TAMSULOSIN HYDROCHLORIDE 0.4 MG/1
0.4 CAPSULE ORAL EVERY EVENING
Status: DISCONTINUED | OUTPATIENT
Start: 2025-08-09 | End: 2025-08-15 | Stop reason: HOSPADM

## 2025-08-09 RX ORDER — METOPROLOL TARTRATE 1 MG/ML
5 INJECTION, SOLUTION INTRAVENOUS EVERY 5 MIN PRN
Status: DISCONTINUED | OUTPATIENT
Start: 2025-08-09 | End: 2025-08-15 | Stop reason: HOSPADM

## 2025-08-09 RX ORDER — GABAPENTIN 100 MG/1
200 CAPSULE ORAL 2 TIMES DAILY
Status: DISCONTINUED | OUTPATIENT
Start: 2025-08-09 | End: 2025-08-11

## 2025-08-09 RX ORDER — PIPERACILLIN SODIUM, TAZOBACTAM SODIUM 4; .5 G/20ML; G/20ML
4.5 INJECTION, POWDER, LYOPHILIZED, FOR SOLUTION INTRAVENOUS EVERY 6 HOURS
Status: DISCONTINUED | OUTPATIENT
Start: 2025-08-09 | End: 2025-08-15

## 2025-08-09 RX ADMIN — IOPAMIDOL 75 ML: 755 INJECTION, SOLUTION INTRAVENOUS at 01:33

## 2025-08-09 RX ADMIN — QUETIAPINE FUMARATE 12.5 MG: 25 TABLET ORAL at 05:29

## 2025-08-09 RX ADMIN — IPRATROPIUM BROMIDE AND ALBUTEROL SULFATE 3 ML: .5; 3 SOLUTION RESPIRATORY (INHALATION) at 16:23

## 2025-08-09 RX ADMIN — GABAPENTIN 200 MG: 100 CAPSULE ORAL at 21:07

## 2025-08-09 RX ADMIN — INSULIN ASPART 3 UNITS: 100 INJECTION, SOLUTION INTRAVENOUS; SUBCUTANEOUS at 12:50

## 2025-08-09 RX ADMIN — RIVAROXABAN 15 MG: 15 TABLET, FILM COATED ORAL at 21:07

## 2025-08-09 RX ADMIN — SODIUM CHLORIDE 2000 MG: 0.9 INJECTION, SOLUTION INTRAVENOUS at 04:02

## 2025-08-09 RX ADMIN — PIPERACILLIN SODIUM AND TAZOBACTAM SODIUM 4.5 G: 4; .5 INJECTION, POWDER, LYOPHILIZED, FOR SOLUTION INTRAVENOUS at 16:29

## 2025-08-09 RX ADMIN — PIPERACILLIN AND TAZOBACTAM 3.38 G: 3; .375 INJECTION, POWDER, FOR SOLUTION INTRAVENOUS at 03:16

## 2025-08-09 RX ADMIN — SODIUM CHLORIDE: 0.9 INJECTION, SOLUTION INTRAVENOUS at 13:39

## 2025-08-09 RX ADMIN — SODIUM CHLORIDE: 0.9 INJECTION, SOLUTION INTRAVENOUS at 21:07

## 2025-08-09 RX ADMIN — TAMSULOSIN HYDROCHLORIDE 0.4 MG: 0.4 CAPSULE ORAL at 21:07

## 2025-08-09 RX ADMIN — INSULIN ASPART 2 UNITS: 100 INJECTION, SOLUTION INTRAVENOUS; SUBCUTANEOUS at 18:17

## 2025-08-09 RX ADMIN — DILTIAZEM HYDROCHLORIDE 15 MG: 5 INJECTION INTRAVENOUS at 02:24

## 2025-08-09 RX ADMIN — PANTOPRAZOLE SODIUM 40 MG: 20 TABLET, DELAYED RELEASE ORAL at 13:46

## 2025-08-09 RX ADMIN — CLOPIDOGREL 75 MG: 75 TABLET ORAL at 13:46

## 2025-08-09 RX ADMIN — PIPERACILLIN SODIUM AND TAZOBACTAM SODIUM 4.5 G: 4; .5 INJECTION, POWDER, LYOPHILIZED, FOR SOLUTION INTRAVENOUS at 11:32

## 2025-08-09 RX ADMIN — INSULIN ASPART 3 UNITS: 100 INJECTION, SOLUTION INTRAVENOUS; SUBCUTANEOUS at 08:18

## 2025-08-09 RX ADMIN — SODIUM CHLORIDE, SODIUM LACTATE, POTASSIUM CHLORIDE, AND CALCIUM CHLORIDE 1000 ML: .6; .31; .03; .02 INJECTION, SOLUTION INTRAVENOUS at 01:51

## 2025-08-09 RX ADMIN — ACETAMINOPHEN 650 MG: 325 TABLET ORAL at 17:39

## 2025-08-09 RX ADMIN — IPRATROPIUM BROMIDE AND ALBUTEROL SULFATE 3 ML: .5; 3 SOLUTION RESPIRATORY (INHALATION) at 11:53

## 2025-08-09 RX ADMIN — SODIUM CHLORIDE, SODIUM LACTATE, POTASSIUM CHLORIDE, AND CALCIUM CHLORIDE 1000 ML: .6; .31; .03; .02 INJECTION, SOLUTION INTRAVENOUS at 05:30

## 2025-08-09 RX ADMIN — SODIUM CHLORIDE 1500 ML: 0.9 INJECTION, SOLUTION INTRAVENOUS at 15:03

## 2025-08-09 RX ADMIN — IPRATROPIUM BROMIDE AND ALBUTEROL SULFATE 3 ML: .5; 3 SOLUTION RESPIRATORY (INHALATION) at 08:16

## 2025-08-09 RX ADMIN — MAGNESIUM SULFATE HEPTAHYDRATE 2 G: 2 INJECTION, SOLUTION INTRAVENOUS at 01:51

## 2025-08-09 ASSESSMENT — ACTIVITIES OF DAILY LIVING (ADL)
ADLS_ACUITY_SCORE: 59
ADLS_ACUITY_SCORE: 66
ADLS_ACUITY_SCORE: 59
DEPENDENT_IADLS:: CLEANING;COOKING;LAUNDRY;SHOPPING;MEAL PREPARATION;MEDICATION MANAGEMENT;TRANSPORTATION
ADLS_ACUITY_SCORE: 59
ADLS_ACUITY_SCORE: 66
ADLS_ACUITY_SCORE: 59
ADLS_ACUITY_SCORE: 66

## 2025-08-10 ENCOUNTER — APPOINTMENT (OUTPATIENT)
Dept: RADIOLOGY | Facility: HOSPITAL | Age: 80
DRG: 853 | End: 2025-08-10
Attending: INTERNAL MEDICINE
Payer: MEDICARE

## 2025-08-10 ENCOUNTER — APPOINTMENT (OUTPATIENT)
Dept: OCCUPATIONAL THERAPY | Facility: HOSPITAL | Age: 80
DRG: 853 | End: 2025-08-10
Attending: HOSPITALIST
Payer: MEDICARE

## 2025-08-10 ENCOUNTER — APPOINTMENT (OUTPATIENT)
Dept: MRI IMAGING | Facility: HOSPITAL | Age: 80
DRG: 853 | End: 2025-08-10
Attending: INTERNAL MEDICINE
Payer: MEDICARE

## 2025-08-10 LAB
ALBUMIN SERPL BCG-MCNC: 3.2 G/DL (ref 3.5–5.2)
ANION GAP SERPL CALCULATED.3IONS-SCNC: 11 MMOL/L (ref 7–15)
BUN SERPL-MCNC: 26 MG/DL (ref 8–23)
CALCIUM SERPL-MCNC: 8.7 MG/DL (ref 8.8–10.4)
CHLORIDE SERPL-SCNC: 102 MMOL/L (ref 98–107)
CK SERPL-CCNC: 782 U/L (ref 39–308)
CREAT SERPL-MCNC: 1.4 MG/DL (ref 0.67–1.17)
EGFRCR SERPLBLD CKD-EPI 2021: 51 ML/MIN/1.73M2
ERYTHROCYTE [DISTWIDTH] IN BLOOD BY AUTOMATED COUNT: 14.8 % (ref 10–15)
GLUCOSE BLDC GLUCOMTR-MCNC: 174 MG/DL (ref 70–99)
GLUCOSE BLDC GLUCOMTR-MCNC: 186 MG/DL (ref 70–99)
GLUCOSE BLDC GLUCOMTR-MCNC: 195 MG/DL (ref 70–99)
GLUCOSE BLDC GLUCOMTR-MCNC: 197 MG/DL (ref 70–99)
GLUCOSE BLDC GLUCOMTR-MCNC: 198 MG/DL (ref 70–99)
GLUCOSE SERPL-MCNC: 197 MG/DL (ref 70–99)
HCO3 SERPL-SCNC: 22 MMOL/L (ref 22–29)
HCT VFR BLD AUTO: 33.4 % (ref 40–53)
HGB BLD-MCNC: 10.6 G/DL (ref 13.3–17.7)
LACTATE SERPL-SCNC: 1.7 MMOL/L (ref 0.7–2)
MAGNESIUM SERPL-MCNC: 1.8 MG/DL (ref 1.7–2.3)
MCH RBC QN AUTO: 27 PG (ref 26.5–33)
MCHC RBC AUTO-ENTMCNC: 31.7 G/DL (ref 31.5–36.5)
MCV RBC AUTO: 85 FL (ref 78–100)
PHOSPHATE SERPL-MCNC: 2.4 MG/DL (ref 2.5–4.5)
PLATELET # BLD AUTO: 160 10E3/UL (ref 150–450)
POTASSIUM SERPL-SCNC: 4.5 MMOL/L (ref 3.4–5.3)
RBC # BLD AUTO: 3.92 10E6/UL (ref 4.4–5.9)
SODIUM SERPL-SCNC: 135 MMOL/L (ref 135–145)
WBC # BLD AUTO: 8.8 10E3/UL (ref 4–11)

## 2025-08-10 PROCEDURE — 999N000157 HC STATISTIC RCP TIME EA 10 MIN

## 2025-08-10 PROCEDURE — 250N000012 HC RX MED GY IP 250 OP 636 PS 637: Performed by: INTERNAL MEDICINE

## 2025-08-10 PROCEDURE — 82550 ASSAY OF CK (CPK): CPT | Performed by: INTERNAL MEDICINE

## 2025-08-10 PROCEDURE — 97530 THERAPEUTIC ACTIVITIES: CPT | Mod: GO

## 2025-08-10 PROCEDURE — 82310 ASSAY OF CALCIUM: CPT | Performed by: INTERNAL MEDICINE

## 2025-08-10 PROCEDURE — 72170 X-RAY EXAM OF PELVIS: CPT

## 2025-08-10 PROCEDURE — 83605 ASSAY OF LACTIC ACID: CPT | Performed by: INTERNAL MEDICINE

## 2025-08-10 PROCEDURE — 255N000002 HC RX 255 OP 636: Performed by: INTERNAL MEDICINE

## 2025-08-10 PROCEDURE — 97165 OT EVAL LOW COMPLEX 30 MIN: CPT | Mod: GO

## 2025-08-10 PROCEDURE — 99233 SBSQ HOSP IP/OBS HIGH 50: CPT | Performed by: INTERNAL MEDICINE

## 2025-08-10 PROCEDURE — 999N000156 HC STATISTIC RCP CONSULT EA 30 MIN

## 2025-08-10 PROCEDURE — 250N000009 HC RX 250: Performed by: HOSPITALIST

## 2025-08-10 PROCEDURE — 94640 AIRWAY INHALATION TREATMENT: CPT | Mod: 76

## 2025-08-10 PROCEDURE — 73723 MRI JOINT LWR EXTR W/O&W/DYE: CPT | Mod: RT

## 2025-08-10 PROCEDURE — 120N000001 HC R&B MED SURG/OB

## 2025-08-10 PROCEDURE — A9585 GADOBUTROL INJECTION: HCPCS | Performed by: INTERNAL MEDICINE

## 2025-08-10 PROCEDURE — 250N000013 HC RX MED GY IP 250 OP 250 PS 637: Performed by: INTERNAL MEDICINE

## 2025-08-10 PROCEDURE — 85018 HEMOGLOBIN: CPT | Performed by: INTERNAL MEDICINE

## 2025-08-10 PROCEDURE — 36415 COLL VENOUS BLD VENIPUNCTURE: CPT | Performed by: INTERNAL MEDICINE

## 2025-08-10 PROCEDURE — 250N000011 HC RX IP 250 OP 636: Performed by: HOSPITALIST

## 2025-08-10 PROCEDURE — 250N000013 HC RX MED GY IP 250 OP 250 PS 637: Performed by: HOSPITALIST

## 2025-08-10 PROCEDURE — 73552 X-RAY EXAM OF FEMUR 2/>: CPT | Mod: RT

## 2025-08-10 PROCEDURE — 83735 ASSAY OF MAGNESIUM: CPT | Performed by: INTERNAL MEDICINE

## 2025-08-10 PROCEDURE — 258N000003 HC RX IP 258 OP 636: Performed by: INTERNAL MEDICINE

## 2025-08-10 RX ORDER — MAGNESIUM OXIDE 400 MG/1
400 TABLET ORAL EVERY 4 HOURS
Status: COMPLETED | OUTPATIENT
Start: 2025-08-10 | End: 2025-08-10

## 2025-08-10 RX ORDER — IPRATROPIUM BROMIDE AND ALBUTEROL SULFATE 2.5; .5 MG/3ML; MG/3ML
3 SOLUTION RESPIRATORY (INHALATION) EVERY 6 HOURS PRN
Status: DISCONTINUED | OUTPATIENT
Start: 2025-08-10 | End: 2025-08-15 | Stop reason: HOSPADM

## 2025-08-10 RX ORDER — GADOBUTROL 604.72 MG/ML
9 INJECTION INTRAVENOUS ONCE
Status: COMPLETED | OUTPATIENT
Start: 2025-08-10 | End: 2025-08-10

## 2025-08-10 RX ADMIN — SODIUM CHLORIDE: 0.9 INJECTION, SOLUTION INTRAVENOUS at 06:37

## 2025-08-10 RX ADMIN — VANCOMYCIN HYDROCHLORIDE 1000 MG: 1 INJECTION, SOLUTION INTRAVENOUS at 04:26

## 2025-08-10 RX ADMIN — INSULIN ASPART 2 UNITS: 100 INJECTION, SOLUTION INTRAVENOUS; SUBCUTANEOUS at 18:37

## 2025-08-10 RX ADMIN — INSULIN GLARGINE 5 UNITS: 100 INJECTION, SOLUTION SUBCUTANEOUS at 09:52

## 2025-08-10 RX ADMIN — IPRATROPIUM BROMIDE AND ALBUTEROL SULFATE 3 ML: .5; 3 SOLUTION RESPIRATORY (INHALATION) at 11:50

## 2025-08-10 RX ADMIN — TAMSULOSIN HYDROCHLORIDE 0.4 MG: 0.4 CAPSULE ORAL at 21:12

## 2025-08-10 RX ADMIN — PANTOPRAZOLE SODIUM 40 MG: 20 TABLET, DELAYED RELEASE ORAL at 08:45

## 2025-08-10 RX ADMIN — ACETAMINOPHEN 650 MG: 325 TABLET ORAL at 21:19

## 2025-08-10 RX ADMIN — CLOPIDOGREL 75 MG: 75 TABLET ORAL at 08:44

## 2025-08-10 RX ADMIN — GABAPENTIN 200 MG: 100 CAPSULE ORAL at 08:44

## 2025-08-10 RX ADMIN — PIPERACILLIN SODIUM AND TAZOBACTAM SODIUM 4.5 G: 4; .5 INJECTION, POWDER, LYOPHILIZED, FOR SOLUTION INTRAVENOUS at 01:26

## 2025-08-10 RX ADMIN — INSULIN ASPART 1 UNITS: 100 INJECTION, SOLUTION INTRAVENOUS; SUBCUTANEOUS at 09:51

## 2025-08-10 RX ADMIN — INSULIN ASPART 2 UNITS: 100 INJECTION, SOLUTION INTRAVENOUS; SUBCUTANEOUS at 14:55

## 2025-08-10 RX ADMIN — MAGNESIUM OXIDE TAB 400 MG (241.3 MG ELEMENTAL MG) 400 MG: 400 (241.3 MG) TAB at 16:53

## 2025-08-10 RX ADMIN — GADOBUTROL 9 ML: 604.72 INJECTION INTRAVENOUS at 12:53

## 2025-08-10 RX ADMIN — PIPERACILLIN SODIUM AND TAZOBACTAM SODIUM 4.5 G: 4; .5 INJECTION, POWDER, LYOPHILIZED, FOR SOLUTION INTRAVENOUS at 21:12

## 2025-08-10 RX ADMIN — PIPERACILLIN SODIUM AND TAZOBACTAM SODIUM 4.5 G: 4; .5 INJECTION, POWDER, LYOPHILIZED, FOR SOLUTION INTRAVENOUS at 14:53

## 2025-08-10 RX ADMIN — GABAPENTIN 200 MG: 100 CAPSULE ORAL at 21:11

## 2025-08-10 RX ADMIN — MAGNESIUM OXIDE TAB 400 MG (241.3 MG ELEMENTAL MG) 400 MG: 400 (241.3 MG) TAB at 21:11

## 2025-08-10 RX ADMIN — PIPERACILLIN SODIUM AND TAZOBACTAM SODIUM 4.5 G: 4; .5 INJECTION, POWDER, LYOPHILIZED, FOR SOLUTION INTRAVENOUS at 08:44

## 2025-08-10 ASSESSMENT — ACTIVITIES OF DAILY LIVING (ADL)
ADLS_ACUITY_SCORE: 71
ADLS_ACUITY_SCORE: 66
ADLS_ACUITY_SCORE: 71
ADLS_ACUITY_SCORE: 71
ADLS_ACUITY_SCORE: 70
ADLS_ACUITY_SCORE: 66
ADLS_ACUITY_SCORE: 66
ADLS_ACUITY_SCORE: 71
ADLS_ACUITY_SCORE: 71
ADLS_ACUITY_SCORE: 66
ADLS_ACUITY_SCORE: 71
ADLS_ACUITY_SCORE: 71
ADLS_ACUITY_SCORE: 66
ADLS_ACUITY_SCORE: 71
ADLS_ACUITY_SCORE: 70
ADLS_ACUITY_SCORE: 66
ADLS_ACUITY_SCORE: 66
ADLS_ACUITY_SCORE: 71
ADLS_ACUITY_SCORE: 70
ADLS_ACUITY_SCORE: 71
ADLS_ACUITY_SCORE: 66

## 2025-08-11 ENCOUNTER — APPOINTMENT (OUTPATIENT)
Dept: OCCUPATIONAL THERAPY | Facility: HOSPITAL | Age: 80
DRG: 853 | End: 2025-08-11
Payer: MEDICARE

## 2025-08-11 ENCOUNTER — APPOINTMENT (OUTPATIENT)
Dept: CARDIOLOGY | Facility: HOSPITAL | Age: 80
DRG: 853 | End: 2025-08-11
Attending: INTERNAL MEDICINE
Payer: MEDICARE

## 2025-08-11 ENCOUNTER — TELEPHONE (OUTPATIENT)
Dept: VASCULAR SURGERY | Facility: CLINIC | Age: 80
End: 2025-08-11

## 2025-08-11 LAB
ALBUMIN SERPL BCG-MCNC: 2.9 G/DL (ref 3.5–5.2)
ANION GAP SERPL CALCULATED.3IONS-SCNC: 9 MMOL/L (ref 7–15)
BASOPHILS # BLD AUTO: 0 10E3/UL (ref 0–0.2)
BASOPHILS NFR BLD AUTO: 1 %
BUN SERPL-MCNC: 24.3 MG/DL (ref 8–23)
CALCIUM SERPL-MCNC: 8.9 MG/DL (ref 8.8–10.4)
CHLORIDE SERPL-SCNC: 102 MMOL/L (ref 98–107)
CK SERPL-CCNC: 351 U/L (ref 39–308)
CREAT SERPL-MCNC: 1.42 MG/DL (ref 0.67–1.17)
EGFRCR SERPLBLD CKD-EPI 2021: 50 ML/MIN/1.73M2
EOSINOPHIL # BLD AUTO: 0.3 10E3/UL (ref 0–0.7)
EOSINOPHIL NFR BLD AUTO: 5 %
ERYTHROCYTE [DISTWIDTH] IN BLOOD BY AUTOMATED COUNT: 14.6 % (ref 10–15)
GLUCOSE BLDC GLUCOMTR-MCNC: 154 MG/DL (ref 70–99)
GLUCOSE BLDC GLUCOMTR-MCNC: 186 MG/DL (ref 70–99)
GLUCOSE BLDC GLUCOMTR-MCNC: 195 MG/DL (ref 70–99)
GLUCOSE BLDC GLUCOMTR-MCNC: 202 MG/DL (ref 70–99)
GLUCOSE SERPL-MCNC: 156 MG/DL (ref 70–99)
HCO3 SERPL-SCNC: 23 MMOL/L (ref 22–29)
HCT VFR BLD AUTO: 31.4 % (ref 40–53)
HGB BLD-MCNC: 9.8 G/DL (ref 13.3–17.7)
IMM GRANULOCYTES # BLD: 0 10E3/UL
IMM GRANULOCYTES NFR BLD: 0 %
LVEF ECHO: NORMAL
LYMPHOCYTES # BLD AUTO: 0.8 10E3/UL (ref 0.8–5.3)
LYMPHOCYTES NFR BLD AUTO: 15 %
MAGNESIUM SERPL-MCNC: 1.9 MG/DL (ref 1.7–2.3)
MCH RBC QN AUTO: 26.7 PG (ref 26.5–33)
MCHC RBC AUTO-ENTMCNC: 31.2 G/DL (ref 31.5–36.5)
MCV RBC AUTO: 86 FL (ref 78–100)
MONOCYTES # BLD AUTO: 0.5 10E3/UL (ref 0–1.3)
MONOCYTES NFR BLD AUTO: 9 %
NEUTROPHILS # BLD AUTO: 4 10E3/UL (ref 1.6–8.3)
NEUTROPHILS NFR BLD AUTO: 71 %
NRBC # BLD AUTO: 0 10E3/UL
NRBC BLD AUTO-RTO: 0 /100
PHOSPHATE SERPL-MCNC: 2.6 MG/DL (ref 2.5–4.5)
PLATELET # BLD AUTO: 148 10E3/UL (ref 150–450)
POTASSIUM SERPL-SCNC: 4.3 MMOL/L (ref 3.4–5.3)
RBC # BLD AUTO: 3.67 10E6/UL (ref 4.4–5.9)
SODIUM SERPL-SCNC: 134 MMOL/L (ref 135–145)
T4 FREE SERPL-MCNC: 1.13 NG/DL (ref 0.9–1.7)
TSH SERPL DL<=0.005 MIU/L-ACNC: 4.92 UIU/ML (ref 0.3–4.2)
VANCOMYCIN SERPL-MCNC: 10.7 UG/ML (ref ?–25)
WBC # BLD AUTO: 5.7 10E3/UL (ref 4–11)

## 2025-08-11 PROCEDURE — 36415 COLL VENOUS BLD VENIPUNCTURE: CPT | Performed by: INTERNAL MEDICINE

## 2025-08-11 PROCEDURE — 82550 ASSAY OF CK (CPK): CPT | Performed by: INTERNAL MEDICINE

## 2025-08-11 PROCEDURE — 250N000012 HC RX MED GY IP 250 OP 636 PS 637: Performed by: HOSPITALIST

## 2025-08-11 PROCEDURE — 84443 ASSAY THYROID STIM HORMONE: CPT | Performed by: INTERNAL MEDICINE

## 2025-08-11 PROCEDURE — 93306 TTE W/DOPPLER COMPLETE: CPT | Mod: 26 | Performed by: INTERNAL MEDICINE

## 2025-08-11 PROCEDURE — 255N000002 HC RX 255 OP 636: Performed by: INTERNAL MEDICINE

## 2025-08-11 PROCEDURE — 250N000013 HC RX MED GY IP 250 OP 250 PS 637: Performed by: INTERNAL MEDICINE

## 2025-08-11 PROCEDURE — 99233 SBSQ HOSP IP/OBS HIGH 50: CPT | Performed by: INTERNAL MEDICINE

## 2025-08-11 PROCEDURE — 250N000011 HC RX IP 250 OP 636: Performed by: HOSPITALIST

## 2025-08-11 PROCEDURE — 82310 ASSAY OF CALCIUM: CPT | Performed by: INTERNAL MEDICINE

## 2025-08-11 PROCEDURE — 120N000001 HC R&B MED SURG/OB

## 2025-08-11 PROCEDURE — G0463 HOSPITAL OUTPT CLINIC VISIT: HCPCS

## 2025-08-11 PROCEDURE — 999N000208 ECHOCARDIOGRAM COMPLETE

## 2025-08-11 PROCEDURE — 85025 COMPLETE CBC W/AUTO DIFF WBC: CPT | Performed by: INTERNAL MEDICINE

## 2025-08-11 PROCEDURE — 97535 SELF CARE MNGMENT TRAINING: CPT | Mod: GO

## 2025-08-11 PROCEDURE — 83735 ASSAY OF MAGNESIUM: CPT | Performed by: INTERNAL MEDICINE

## 2025-08-11 PROCEDURE — 99222 1ST HOSP IP/OBS MODERATE 55: CPT | Performed by: PODIATRIST

## 2025-08-11 PROCEDURE — 80202 ASSAY OF VANCOMYCIN: CPT | Performed by: INTERNAL MEDICINE

## 2025-08-11 PROCEDURE — 84439 ASSAY OF FREE THYROXINE: CPT | Performed by: INTERNAL MEDICINE

## 2025-08-11 RX ORDER — HEPARIN SODIUM 5000 [USP'U]/.5ML
5000 INJECTION, SOLUTION INTRAVENOUS; SUBCUTANEOUS EVERY 12 HOURS
Status: DISCONTINUED | OUTPATIENT
Start: 2025-08-11 | End: 2025-08-11

## 2025-08-11 RX ORDER — ACETAMINOPHEN 325 MG/1
975 TABLET ORAL 3 TIMES DAILY
Status: DISCONTINUED | OUTPATIENT
Start: 2025-08-11 | End: 2025-08-15 | Stop reason: HOSPADM

## 2025-08-11 RX ORDER — CEFAZOLIN SODIUM 1 G/50ML
1250 SOLUTION INTRAVENOUS EVERY 24 HOURS
Status: DISCONTINUED | OUTPATIENT
Start: 2025-08-12 | End: 2025-08-14

## 2025-08-11 RX ORDER — CLINDAMYCIN PHOSPHATE 900 MG/50ML
900 INJECTION, SOLUTION INTRAVENOUS
Status: CANCELLED | OUTPATIENT
Start: 2025-08-11

## 2025-08-11 RX ORDER — MAGNESIUM OXIDE 400 MG/1
400 TABLET ORAL EVERY 4 HOURS
Status: COMPLETED | OUTPATIENT
Start: 2025-08-11 | End: 2025-08-11

## 2025-08-11 RX ORDER — AMOXICILLIN 250 MG
2 CAPSULE ORAL
Status: DISCONTINUED | OUTPATIENT
Start: 2025-08-11 | End: 2025-08-15 | Stop reason: HOSPADM

## 2025-08-11 RX ORDER — GABAPENTIN 300 MG/1
300 CAPSULE ORAL 2 TIMES DAILY
Status: DISCONTINUED | OUTPATIENT
Start: 2025-08-11 | End: 2025-08-15 | Stop reason: HOSPADM

## 2025-08-11 RX ORDER — CLINDAMYCIN PHOSPHATE 900 MG/50ML
900 INJECTION, SOLUTION INTRAVENOUS SEE ADMIN INSTRUCTIONS
Status: CANCELLED | OUTPATIENT
Start: 2025-08-11

## 2025-08-11 RX ADMIN — GABAPENTIN 300 MG: 300 CAPSULE ORAL at 20:57

## 2025-08-11 RX ADMIN — DICLOFENAC SODIUM 4 G: 10 GEL TOPICAL at 16:50

## 2025-08-11 RX ADMIN — PIPERACILLIN SODIUM AND TAZOBACTAM SODIUM 4.5 G: 4; .5 INJECTION, POWDER, LYOPHILIZED, FOR SOLUTION INTRAVENOUS at 09:01

## 2025-08-11 RX ADMIN — PANTOPRAZOLE SODIUM 40 MG: 20 TABLET, DELAYED RELEASE ORAL at 09:01

## 2025-08-11 RX ADMIN — SENNOSIDES, DOCUSATE SODIUM 2 TABLET: 50; 8.6 TABLET, FILM COATED ORAL at 12:22

## 2025-08-11 RX ADMIN — ACETAMINOPHEN 975 MG: 325 TABLET ORAL at 16:49

## 2025-08-11 RX ADMIN — DICLOFENAC SODIUM 4 G: 10 GEL TOPICAL at 12:03

## 2025-08-11 RX ADMIN — PIPERACILLIN SODIUM AND TAZOBACTAM SODIUM 4.5 G: 4; .5 INJECTION, POWDER, LYOPHILIZED, FOR SOLUTION INTRAVENOUS at 14:38

## 2025-08-11 RX ADMIN — INSULIN ASPART 2 UNITS: 100 INJECTION, SOLUTION INTRAVENOUS; SUBCUTANEOUS at 09:08

## 2025-08-11 RX ADMIN — ACETAMINOPHEN 975 MG: 325 TABLET ORAL at 20:58

## 2025-08-11 RX ADMIN — DICLOFENAC SODIUM 4 G: 10 GEL TOPICAL at 20:58

## 2025-08-11 RX ADMIN — INSULIN ASPART 2 UNITS: 100 INJECTION, SOLUTION INTRAVENOUS; SUBCUTANEOUS at 12:23

## 2025-08-11 RX ADMIN — MAGNESIUM OXIDE TAB 400 MG (241.3 MG ELEMENTAL MG) 400 MG: 400 (241.3 MG) TAB at 06:38

## 2025-08-11 RX ADMIN — PIPERACILLIN SODIUM AND TAZOBACTAM SODIUM 4.5 G: 4; .5 INJECTION, POWDER, LYOPHILIZED, FOR SOLUTION INTRAVENOUS at 01:13

## 2025-08-11 RX ADMIN — INSULIN ASPART 1 UNITS: 100 INJECTION, SOLUTION INTRAVENOUS; SUBCUTANEOUS at 16:50

## 2025-08-11 RX ADMIN — INSULIN GLARGINE 5 UNITS: 100 INJECTION, SOLUTION SUBCUTANEOUS at 08:59

## 2025-08-11 RX ADMIN — PERFLUTREN 3 ML (DILUTED): 6.52 INJECTION, SUSPENSION INTRAVENOUS at 11:45

## 2025-08-11 RX ADMIN — MAGNESIUM OXIDE TAB 400 MG (241.3 MG ELEMENTAL MG) 400 MG: 400 (241.3 MG) TAB at 12:02

## 2025-08-11 RX ADMIN — GABAPENTIN 200 MG: 100 CAPSULE ORAL at 09:01

## 2025-08-11 RX ADMIN — PIPERACILLIN SODIUM AND TAZOBACTAM SODIUM 4.5 G: 4; .5 INJECTION, POWDER, LYOPHILIZED, FOR SOLUTION INTRAVENOUS at 20:58

## 2025-08-11 RX ADMIN — VANCOMYCIN HYDROCHLORIDE 1000 MG: 1 INJECTION, SOLUTION INTRAVENOUS at 04:15

## 2025-08-11 RX ADMIN — TAMSULOSIN HYDROCHLORIDE 0.4 MG: 0.4 CAPSULE ORAL at 20:57

## 2025-08-11 RX ADMIN — ACETAMINOPHEN 975 MG: 325 TABLET ORAL at 12:02

## 2025-08-11 ASSESSMENT — ACTIVITIES OF DAILY LIVING (ADL)
ADLS_ACUITY_SCORE: 51
ADLS_ACUITY_SCORE: 66
ADLS_ACUITY_SCORE: 70
ADLS_ACUITY_SCORE: 66
ADLS_ACUITY_SCORE: 51

## 2025-08-12 ENCOUNTER — ANESTHESIA EVENT (OUTPATIENT)
Dept: SURGERY | Facility: HOSPITAL | Age: 80
End: 2025-08-12
Payer: MEDICARE

## 2025-08-12 ENCOUNTER — ANESTHESIA (OUTPATIENT)
Dept: SURGERY | Facility: HOSPITAL | Age: 80
End: 2025-08-12
Payer: MEDICARE

## 2025-08-12 LAB
BACTERIA SPEC CULT: NORMAL
CREAT SERPL-MCNC: 1.31 MG/DL (ref 0.67–1.17)
EGFRCR SERPLBLD CKD-EPI 2021: 55 ML/MIN/1.73M2
GLUCOSE BLDC GLUCOMTR-MCNC: 126 MG/DL (ref 70–99)
GLUCOSE BLDC GLUCOMTR-MCNC: 145 MG/DL (ref 70–99)
GLUCOSE BLDC GLUCOMTR-MCNC: 149 MG/DL (ref 70–99)
GLUCOSE BLDC GLUCOMTR-MCNC: 149 MG/DL (ref 70–99)
GLUCOSE BLDC GLUCOMTR-MCNC: 153 MG/DL (ref 70–99)
GRAM STAIN RESULT: NORMAL
GRAM STAIN RESULT: NORMAL
MAGNESIUM SERPL-MCNC: 2 MG/DL (ref 1.7–2.3)
POTASSIUM SERPL-SCNC: 4.4 MMOL/L (ref 3.4–5.3)

## 2025-08-12 PROCEDURE — 250N000011 HC RX IP 250 OP 636: Performed by: PODIATRIST

## 2025-08-12 PROCEDURE — 87186 SC STD MICRODIL/AGAR DIL: CPT | Performed by: PODIATRIST

## 2025-08-12 PROCEDURE — 99233 SBSQ HOSP IP/OBS HIGH 50: CPT | Performed by: INTERNAL MEDICINE

## 2025-08-12 PROCEDURE — 360N000076 HC SURGERY LEVEL 3, PER MIN: Performed by: PODIATRIST

## 2025-08-12 PROCEDURE — 258N000003 HC RX IP 258 OP 636: Performed by: STUDENT IN AN ORGANIZED HEALTH CARE EDUCATION/TRAINING PROGRAM

## 2025-08-12 PROCEDURE — 83735 ASSAY OF MAGNESIUM: CPT | Performed by: INTERNAL MEDICINE

## 2025-08-12 PROCEDURE — 999N000248 HC STATISTIC IV INSERT WITH US BY RN

## 2025-08-12 PROCEDURE — 0Y6M0ZD DETACHMENT AT RIGHT FOOT, PARTIAL 4TH RAY, OPEN APPROACH: ICD-10-PCS | Performed by: PODIATRIST

## 2025-08-12 PROCEDURE — 36415 COLL VENOUS BLD VENIPUNCTURE: CPT | Performed by: HOSPITALIST

## 2025-08-12 PROCEDURE — 250N000011 HC RX IP 250 OP 636: Performed by: HOSPITALIST

## 2025-08-12 PROCEDURE — 250N000011 HC RX IP 250 OP 636: Performed by: INTERNAL MEDICINE

## 2025-08-12 PROCEDURE — 82565 ASSAY OF CREATININE: CPT | Performed by: HOSPITALIST

## 2025-08-12 PROCEDURE — 87075 CULTR BACTERIA EXCEPT BLOOD: CPT | Performed by: PODIATRIST

## 2025-08-12 PROCEDURE — 250N000013 HC RX MED GY IP 250 OP 250 PS 637: Performed by: PODIATRIST

## 2025-08-12 PROCEDURE — 250N000011 HC RX IP 250 OP 636: Performed by: ANESTHESIOLOGY

## 2025-08-12 PROCEDURE — 120N000001 HC R&B MED SURG/OB

## 2025-08-12 PROCEDURE — 0JBQ0ZZ EXCISION OF RIGHT FOOT SUBCUTANEOUS TISSUE AND FASCIA, OPEN APPROACH: ICD-10-PCS | Performed by: PODIATRIST

## 2025-08-12 PROCEDURE — 87205 SMEAR GRAM STAIN: CPT | Performed by: PODIATRIST

## 2025-08-12 PROCEDURE — 250N000009 HC RX 250: Performed by: STUDENT IN AN ORGANIZED HEALTH CARE EDUCATION/TRAINING PROGRAM

## 2025-08-12 PROCEDURE — 258N000003 HC RX IP 258 OP 636: Performed by: ANESTHESIOLOGY

## 2025-08-12 PROCEDURE — 250N000011 HC RX IP 250 OP 636: Performed by: STUDENT IN AN ORGANIZED HEALTH CARE EDUCATION/TRAINING PROGRAM

## 2025-08-12 PROCEDURE — 999N000141 HC STATISTIC PRE-PROCEDURE NURSING ASSESSMENT: Performed by: PODIATRIST

## 2025-08-12 PROCEDURE — 88311 DECALCIFY TISSUE: CPT | Mod: TC | Performed by: PODIATRIST

## 2025-08-12 PROCEDURE — 250N000009 HC RX 250: Performed by: ANESTHESIOLOGY

## 2025-08-12 PROCEDURE — 250N000013 HC RX MED GY IP 250 OP 250 PS 637: Performed by: INTERNAL MEDICINE

## 2025-08-12 PROCEDURE — 84132 ASSAY OF SERUM POTASSIUM: CPT | Performed by: INTERNAL MEDICINE

## 2025-08-12 PROCEDURE — 370N000017 HC ANESTHESIA TECHNICAL FEE, PER MIN: Performed by: PODIATRIST

## 2025-08-12 PROCEDURE — 272N000001 HC OR GENERAL SUPPLY STERILE: Performed by: PODIATRIST

## 2025-08-12 RX ORDER — ONDANSETRON 4 MG/1
4 TABLET, ORALLY DISINTEGRATING ORAL EVERY 30 MIN PRN
Status: CANCELLED | OUTPATIENT
Start: 2025-08-12

## 2025-08-12 RX ORDER — OXYCODONE HYDROCHLORIDE 5 MG/1
5 TABLET ORAL EVERY 4 HOURS PRN
Status: DISCONTINUED | OUTPATIENT
Start: 2025-08-12 | End: 2025-08-15 | Stop reason: HOSPADM

## 2025-08-12 RX ORDER — LIDOCAINE HYDROCHLORIDE 10 MG/ML
INJECTION, SOLUTION INFILTRATION; PERINEURAL PRN
Status: DISCONTINUED | OUTPATIENT
Start: 2025-08-12 | End: 2025-08-12

## 2025-08-12 RX ORDER — HYDROMORPHONE HCL IN WATER/PF 6 MG/30 ML
0.2 PATIENT CONTROLLED ANALGESIA SYRINGE INTRAVENOUS
Status: DISCONTINUED | OUTPATIENT
Start: 2025-08-12 | End: 2025-08-15 | Stop reason: HOSPADM

## 2025-08-12 RX ORDER — LIDOCAINE 40 MG/G
CREAM TOPICAL
Status: DISCONTINUED | OUTPATIENT
Start: 2025-08-12 | End: 2025-08-15 | Stop reason: HOSPADM

## 2025-08-12 RX ORDER — BISACODYL 10 MG
10 SUPPOSITORY, RECTAL RECTAL DAILY PRN
Status: DISCONTINUED | OUTPATIENT
Start: 2025-08-15 | End: 2025-08-15 | Stop reason: HOSPADM

## 2025-08-12 RX ORDER — SODIUM CHLORIDE, SODIUM LACTATE, POTASSIUM CHLORIDE, CALCIUM CHLORIDE 600; 310; 30; 20 MG/100ML; MG/100ML; MG/100ML; MG/100ML
INJECTION, SOLUTION INTRAVENOUS CONTINUOUS PRN
Status: DISCONTINUED | OUTPATIENT
Start: 2025-08-12 | End: 2025-08-12

## 2025-08-12 RX ORDER — BUPIVACAINE HCL/EPINEPHRINE 0.25-.0005
VIAL (ML) INJECTION
Status: COMPLETED | OUTPATIENT
Start: 2025-08-12 | End: 2025-08-12

## 2025-08-12 RX ORDER — DEXAMETHASONE SODIUM PHOSPHATE 10 MG/ML
4 INJECTION, SOLUTION INTRAMUSCULAR; INTRAVENOUS
Status: CANCELLED | OUTPATIENT
Start: 2025-08-12

## 2025-08-12 RX ORDER — NALOXONE HYDROCHLORIDE 0.4 MG/ML
0.4 INJECTION, SOLUTION INTRAMUSCULAR; INTRAVENOUS; SUBCUTANEOUS
Status: DISCONTINUED | OUTPATIENT
Start: 2025-08-12 | End: 2025-08-15 | Stop reason: HOSPADM

## 2025-08-12 RX ORDER — OXYCODONE HYDROCHLORIDE 5 MG/1
10 TABLET ORAL EVERY 4 HOURS PRN
Status: DISCONTINUED | OUTPATIENT
Start: 2025-08-12 | End: 2025-08-15 | Stop reason: HOSPADM

## 2025-08-12 RX ORDER — ACETAMINOPHEN 325 MG/1
975 TABLET ORAL EVERY 8 HOURS
Status: DISCONTINUED | OUTPATIENT
Start: 2025-08-12 | End: 2025-08-12

## 2025-08-12 RX ORDER — OXYCODONE HYDROCHLORIDE 5 MG/1
10 TABLET ORAL
Refills: 0 | Status: CANCELLED | OUTPATIENT
Start: 2025-08-12

## 2025-08-12 RX ORDER — CEFAZOLIN SODIUM/WATER 2 G/20 ML
2 SYRINGE (ML) INTRAVENOUS SEE ADMIN INSTRUCTIONS
Status: DISCONTINUED | OUTPATIENT
Start: 2025-08-12 | End: 2025-08-13

## 2025-08-12 RX ORDER — OXYCODONE HYDROCHLORIDE 5 MG/1
5 TABLET ORAL
Refills: 0 | Status: CANCELLED | OUTPATIENT
Start: 2025-08-12

## 2025-08-12 RX ORDER — PROPOFOL 10 MG/ML
INJECTION, EMULSION INTRAVENOUS PRN
Status: DISCONTINUED | OUTPATIENT
Start: 2025-08-12 | End: 2025-08-12

## 2025-08-12 RX ORDER — ONDANSETRON 2 MG/ML
4 INJECTION INTRAMUSCULAR; INTRAVENOUS EVERY 30 MIN PRN
Status: CANCELLED | OUTPATIENT
Start: 2025-08-12

## 2025-08-12 RX ORDER — NALOXONE HYDROCHLORIDE 0.4 MG/ML
0.2 INJECTION, SOLUTION INTRAMUSCULAR; INTRAVENOUS; SUBCUTANEOUS
Status: DISCONTINUED | OUTPATIENT
Start: 2025-08-12 | End: 2025-08-15 | Stop reason: HOSPADM

## 2025-08-12 RX ORDER — SODIUM CHLORIDE, SODIUM LACTATE, POTASSIUM CHLORIDE, CALCIUM CHLORIDE 600; 310; 30; 20 MG/100ML; MG/100ML; MG/100ML; MG/100ML
INJECTION, SOLUTION INTRAVENOUS CONTINUOUS
Status: DISCONTINUED | OUTPATIENT
Start: 2025-08-12 | End: 2025-08-13

## 2025-08-12 RX ORDER — PROPOFOL 10 MG/ML
INJECTION, EMULSION INTRAVENOUS CONTINUOUS PRN
Status: DISCONTINUED | OUTPATIENT
Start: 2025-08-12 | End: 2025-08-12

## 2025-08-12 RX ORDER — POLYETHYLENE GLYCOL 3350 17 G/17G
17 POWDER, FOR SOLUTION ORAL DAILY
Status: DISCONTINUED | OUTPATIENT
Start: 2025-08-13 | End: 2025-08-15 | Stop reason: HOSPADM

## 2025-08-12 RX ORDER — MAGNESIUM SULFATE HEPTAHYDRATE 40 MG/ML
2 INJECTION, SOLUTION INTRAVENOUS ONCE
Status: COMPLETED | OUTPATIENT
Start: 2025-08-12 | End: 2025-08-12

## 2025-08-12 RX ORDER — HYDROMORPHONE HCL IN WATER/PF 6 MG/30 ML
0.4 PATIENT CONTROLLED ANALGESIA SYRINGE INTRAVENOUS
Status: DISCONTINUED | OUTPATIENT
Start: 2025-08-12 | End: 2025-08-15 | Stop reason: HOSPADM

## 2025-08-12 RX ORDER — CEFAZOLIN SODIUM/WATER 2 G/20 ML
2 SYRINGE (ML) INTRAVENOUS
Status: DISCONTINUED | OUTPATIENT
Start: 2025-08-12 | End: 2025-08-13

## 2025-08-12 RX ORDER — FENTANYL CITRATE 50 UG/ML
25 INJECTION, SOLUTION INTRAMUSCULAR; INTRAVENOUS
Refills: 0 | Status: COMPLETED | OUTPATIENT
Start: 2025-08-12 | End: 2025-08-12

## 2025-08-12 RX ORDER — AMOXICILLIN 250 MG
1 CAPSULE ORAL 2 TIMES DAILY
Status: DISCONTINUED | OUTPATIENT
Start: 2025-08-12 | End: 2025-08-15 | Stop reason: HOSPADM

## 2025-08-12 RX ORDER — MAGNESIUM OXIDE 400 MG/1
400 TABLET ORAL EVERY 4 HOURS
Status: DISCONTINUED | OUTPATIENT
Start: 2025-08-12 | End: 2025-08-12

## 2025-08-12 RX ORDER — FUROSEMIDE 20 MG/1
40 TABLET ORAL DAILY
Status: DISCONTINUED | OUTPATIENT
Start: 2025-08-12 | End: 2025-08-15 | Stop reason: HOSPADM

## 2025-08-12 RX ORDER — NALOXONE HYDROCHLORIDE 0.4 MG/ML
0.1 INJECTION, SOLUTION INTRAMUSCULAR; INTRAVENOUS; SUBCUTANEOUS
Status: CANCELLED | OUTPATIENT
Start: 2025-08-12

## 2025-08-12 RX ADMIN — ACETAMINOPHEN 975 MG: 325 TABLET ORAL at 20:14

## 2025-08-12 RX ADMIN — PROPOFOL 20 MG: 10 INJECTION, EMULSION INTRAVENOUS at 16:52

## 2025-08-12 RX ADMIN — PROPOFOL 80 MCG/KG/MIN: 10 INJECTION, EMULSION INTRAVENOUS at 16:52

## 2025-08-12 RX ADMIN — MIDAZOLAM HYDROCHLORIDE 1 MG: 1 INJECTION, SOLUTION INTRAMUSCULAR; INTRAVENOUS at 16:32

## 2025-08-12 RX ADMIN — DICLOFENAC SODIUM 4 G: 10 GEL TOPICAL at 12:48

## 2025-08-12 RX ADMIN — SODIUM CHLORIDE, POTASSIUM CHLORIDE, SODIUM LACTATE AND CALCIUM CHLORIDE: 600; 310; 30; 20 INJECTION, SOLUTION INTRAVENOUS at 16:44

## 2025-08-12 RX ADMIN — SENNOSIDES, DOCUSATE SODIUM 1 TABLET: 50; 8.6 TABLET, FILM COATED ORAL at 20:15

## 2025-08-12 RX ADMIN — TAMSULOSIN HYDROCHLORIDE 0.4 MG: 0.4 CAPSULE ORAL at 20:09

## 2025-08-12 RX ADMIN — DICLOFENAC SODIUM 4 G: 10 GEL TOPICAL at 08:45

## 2025-08-12 RX ADMIN — FENTANYL CITRATE 50 MCG: 50 INJECTION INTRAMUSCULAR; INTRAVENOUS at 16:31

## 2025-08-12 RX ADMIN — SENNOSIDES, DOCUSATE SODIUM 2 TABLET: 50; 8.6 TABLET, FILM COATED ORAL at 08:44

## 2025-08-12 RX ADMIN — FUROSEMIDE 40 MG: 20 TABLET ORAL at 20:09

## 2025-08-12 RX ADMIN — PIPERACILLIN SODIUM AND TAZOBACTAM SODIUM 4.5 G: 4; .5 INJECTION, POWDER, LYOPHILIZED, FOR SOLUTION INTRAVENOUS at 22:06

## 2025-08-12 RX ADMIN — GABAPENTIN 300 MG: 300 CAPSULE ORAL at 20:09

## 2025-08-12 RX ADMIN — PANTOPRAZOLE SODIUM 40 MG: 20 TABLET, DELAYED RELEASE ORAL at 08:44

## 2025-08-12 RX ADMIN — INSULIN ASPART 1 UNITS: 100 INJECTION, SOLUTION INTRAVENOUS; SUBCUTANEOUS at 12:48

## 2025-08-12 RX ADMIN — SODIUM CHLORIDE, SODIUM LACTATE, POTASSIUM CHLORIDE, AND CALCIUM CHLORIDE: .6; .31; .03; .02 INJECTION, SOLUTION INTRAVENOUS at 16:33

## 2025-08-12 RX ADMIN — ACETAMINOPHEN 975 MG: 325 TABLET ORAL at 08:44

## 2025-08-12 RX ADMIN — MAGNESIUM SULFATE HEPTAHYDRATE 2 G: 2 INJECTION, SOLUTION INTRAVENOUS at 20:19

## 2025-08-12 RX ADMIN — PHENYLEPHRINE HYDROCHLORIDE 50 MCG: 10 INJECTION INTRAVENOUS at 17:09

## 2025-08-12 RX ADMIN — BUPIVACAINE HYDROCHLORIDE AND EPINEPHRINE BITARTRATE 18 ML: 2.5; .005 INJECTION, SOLUTION INFILTRATION; PERINEURAL at 16:29

## 2025-08-12 RX ADMIN — GABAPENTIN 300 MG: 300 CAPSULE ORAL at 08:44

## 2025-08-12 RX ADMIN — PIPERACILLIN SODIUM AND TAZOBACTAM SODIUM 4.5 G: 4; .5 INJECTION, POWDER, LYOPHILIZED, FOR SOLUTION INTRAVENOUS at 16:42

## 2025-08-12 RX ADMIN — LIDOCAINE HYDROCHLORIDE 20 ML: 10 INJECTION, SOLUTION INFILTRATION; PERINEURAL at 16:52

## 2025-08-12 RX ADMIN — Medication 20 ML: at 16:33

## 2025-08-12 RX ADMIN — INSULIN ASPART 1 UNITS: 100 INJECTION, SOLUTION INTRAVENOUS; SUBCUTANEOUS at 08:46

## 2025-08-12 RX ADMIN — PIPERACILLIN SODIUM AND TAZOBACTAM SODIUM 4.5 G: 4; .5 INJECTION, POWDER, LYOPHILIZED, FOR SOLUTION INTRAVENOUS at 10:07

## 2025-08-12 RX ADMIN — ACETAMINOPHEN 975 MG: 325 TABLET ORAL at 14:26

## 2025-08-12 ASSESSMENT — ACTIVITIES OF DAILY LIVING (ADL)
ADLS_ACUITY_SCORE: 51
ADLS_ACUITY_SCORE: 59
ADLS_ACUITY_SCORE: 51
ADLS_ACUITY_SCORE: 51
ADLS_ACUITY_SCORE: 57
ADLS_ACUITY_SCORE: 51
ADLS_ACUITY_SCORE: 55
ADLS_ACUITY_SCORE: 51
ADLS_ACUITY_SCORE: 57
ADLS_ACUITY_SCORE: 59
ADLS_ACUITY_SCORE: 51
ADLS_ACUITY_SCORE: 59
ADLS_ACUITY_SCORE: 51
ADLS_ACUITY_SCORE: 57
ADLS_ACUITY_SCORE: 51
ADLS_ACUITY_SCORE: 51
ADLS_ACUITY_SCORE: 57

## 2025-08-12 ASSESSMENT — COPD QUESTIONNAIRES: COPD: 1

## 2025-08-13 ENCOUNTER — DOCUMENTATION ONLY (OUTPATIENT)
Dept: ORTHOPEDICS | Facility: CLINIC | Age: 80
End: 2025-08-13
Payer: MEDICARE

## 2025-08-13 ENCOUNTER — APPOINTMENT (OUTPATIENT)
Dept: PHYSICAL THERAPY | Facility: HOSPITAL | Age: 80
DRG: 853 | End: 2025-08-13
Attending: PODIATRIST
Payer: MEDICARE

## 2025-08-13 LAB
ALBUMIN SERPL BCG-MCNC: 3.4 G/DL (ref 3.5–5.2)
ANION GAP SERPL CALCULATED.3IONS-SCNC: 11 MMOL/L (ref 7–15)
BASOPHILS # BLD AUTO: 0.06 10E3/UL (ref 0–0.2)
BASOPHILS NFR BLD AUTO: 1 %
BUN SERPL-MCNC: 17.6 MG/DL (ref 8–23)
CALCIUM SERPL-MCNC: 9.1 MG/DL (ref 8.8–10.4)
CHLORIDE SERPL-SCNC: 104 MMOL/L (ref 98–107)
CK SERPL-CCNC: 116 U/L (ref 39–308)
CREAT SERPL-MCNC: 1.41 MG/DL (ref 0.67–1.17)
EGFRCR SERPLBLD CKD-EPI 2021: 50 ML/MIN/1.73M2
EOSINOPHIL # BLD AUTO: 0.28 10E3/UL (ref 0–0.7)
EOSINOPHIL NFR BLD AUTO: 4.4 %
ERYTHROCYTE [DISTWIDTH] IN BLOOD BY AUTOMATED COUNT: 14.2 % (ref 10–15)
EST. AVERAGE GLUCOSE BLD GHB EST-MCNC: 183 MG/DL
GLUCOSE BLDC GLUCOMTR-MCNC: 123 MG/DL (ref 70–99)
GLUCOSE BLDC GLUCOMTR-MCNC: 129 MG/DL (ref 70–99)
GLUCOSE BLDC GLUCOMTR-MCNC: 150 MG/DL (ref 70–99)
GLUCOSE BLDC GLUCOMTR-MCNC: 170 MG/DL (ref 70–99)
GLUCOSE SERPL-MCNC: 132 MG/DL (ref 70–99)
HBA1C MFR BLD: 8 %
HCO3 SERPL-SCNC: 23 MMOL/L (ref 22–29)
HCT VFR BLD AUTO: 33.8 % (ref 40–53)
HGB BLD-MCNC: 11 G/DL (ref 13.3–17.7)
IMM GRANULOCYTES # BLD: 0.05 10E3/UL
IMM GRANULOCYTES NFR BLD: 0.8 %
LYMPHOCYTES # BLD AUTO: 0.78 10E3/UL (ref 0.8–5.3)
LYMPHOCYTES NFR BLD AUTO: 12.4 %
MAGNESIUM SERPL-MCNC: 2.3 MG/DL (ref 1.7–2.3)
MCH RBC QN AUTO: 27.5 PG (ref 26.5–33)
MCHC RBC AUTO-ENTMCNC: 32.5 G/DL (ref 31.5–36.5)
MCV RBC AUTO: 84.5 FL (ref 78–100)
MONOCYTES # BLD AUTO: 0.47 10E3/UL (ref 0–1.3)
MONOCYTES NFR BLD AUTO: 7.5 %
NEUTROPHILS # BLD AUTO: 4.66 10E3/UL (ref 1.6–8.3)
NEUTROPHILS NFR BLD AUTO: 73.9 %
NRBC # BLD AUTO: 0 10E3/UL
NRBC BLD AUTO-RTO: 0 /100
PHOSPHATE SERPL-MCNC: 3.6 MG/DL (ref 2.5–4.5)
PLATELET # BLD AUTO: 190 10E3/UL (ref 150–450)
POTASSIUM SERPL-SCNC: 4.3 MMOL/L (ref 3.4–5.3)
RBC # BLD AUTO: 4 10E6/UL (ref 4.4–5.9)
SODIUM SERPL-SCNC: 138 MMOL/L (ref 135–145)
WBC # BLD AUTO: 6.3 10E3/UL (ref 4–11)

## 2025-08-13 PROCEDURE — 36415 COLL VENOUS BLD VENIPUNCTURE: CPT | Performed by: INTERNAL MEDICINE

## 2025-08-13 PROCEDURE — 120N000001 HC R&B MED SURG/OB

## 2025-08-13 PROCEDURE — 250N000013 HC RX MED GY IP 250 OP 250 PS 637: Performed by: PODIATRIST

## 2025-08-13 PROCEDURE — 250N000013 HC RX MED GY IP 250 OP 250 PS 637: Performed by: INTERNAL MEDICINE

## 2025-08-13 PROCEDURE — 97530 THERAPEUTIC ACTIVITIES: CPT | Mod: GP

## 2025-08-13 PROCEDURE — 85025 COMPLETE CBC W/AUTO DIFF WBC: CPT | Performed by: PODIATRIST

## 2025-08-13 PROCEDURE — 83036 HEMOGLOBIN GLYCOSYLATED A1C: CPT | Performed by: PODIATRIST

## 2025-08-13 PROCEDURE — 80069 RENAL FUNCTION PANEL: CPT | Performed by: PODIATRIST

## 2025-08-13 PROCEDURE — 99233 SBSQ HOSP IP/OBS HIGH 50: CPT | Performed by: INTERNAL MEDICINE

## 2025-08-13 PROCEDURE — 83735 ASSAY OF MAGNESIUM: CPT | Performed by: PODIATRIST

## 2025-08-13 PROCEDURE — L4396 STATIC OR DYNAMI AFO PRE CST: HCPCS

## 2025-08-13 PROCEDURE — 250N000011 HC RX IP 250 OP 636: Performed by: PODIATRIST

## 2025-08-13 PROCEDURE — 258N000003 HC RX IP 258 OP 636: Performed by: PODIATRIST

## 2025-08-13 PROCEDURE — 82550 ASSAY OF CK (CPK): CPT | Performed by: INTERNAL MEDICINE

## 2025-08-13 RX ADMIN — PIPERACILLIN SODIUM AND TAZOBACTAM SODIUM 4.5 G: 4; .5 INJECTION, POWDER, LYOPHILIZED, FOR SOLUTION INTRAVENOUS at 09:12

## 2025-08-13 RX ADMIN — FUROSEMIDE 40 MG: 20 TABLET ORAL at 09:14

## 2025-08-13 RX ADMIN — PIPERACILLIN SODIUM AND TAZOBACTAM SODIUM 4.5 G: 4; .5 INJECTION, POWDER, LYOPHILIZED, FOR SOLUTION INTRAVENOUS at 04:19

## 2025-08-13 RX ADMIN — DICLOFENAC SODIUM 4 G: 10 GEL TOPICAL at 09:12

## 2025-08-13 RX ADMIN — SODIUM CHLORIDE, SODIUM LACTATE, POTASSIUM CHLORIDE, AND CALCIUM CHLORIDE: .6; .31; .03; .02 INJECTION, SOLUTION INTRAVENOUS at 02:12

## 2025-08-13 RX ADMIN — PIPERACILLIN SODIUM AND TAZOBACTAM SODIUM 4.5 G: 4; .5 INJECTION, POWDER, LYOPHILIZED, FOR SOLUTION INTRAVENOUS at 22:17

## 2025-08-13 RX ADMIN — DICLOFENAC SODIUM 4 G: 10 GEL TOPICAL at 22:18

## 2025-08-13 RX ADMIN — INSULIN ASPART 1 UNITS: 100 INJECTION, SOLUTION INTRAVENOUS; SUBCUTANEOUS at 12:36

## 2025-08-13 RX ADMIN — TAMSULOSIN HYDROCHLORIDE 0.4 MG: 0.4 CAPSULE ORAL at 22:17

## 2025-08-13 RX ADMIN — ACETAMINOPHEN 975 MG: 325 TABLET ORAL at 13:44

## 2025-08-13 RX ADMIN — SODIUM CHLORIDE 1250 MG: 0.9 INJECTION, SOLUTION INTRAVENOUS at 05:03

## 2025-08-13 RX ADMIN — DICLOFENAC SODIUM 4 G: 10 GEL TOPICAL at 12:35

## 2025-08-13 RX ADMIN — APIXABAN 5 MG: 2.5 TABLET, FILM COATED ORAL at 22:18

## 2025-08-13 RX ADMIN — GABAPENTIN 300 MG: 300 CAPSULE ORAL at 22:18

## 2025-08-13 RX ADMIN — ACETAMINOPHEN 975 MG: 325 TABLET ORAL at 09:14

## 2025-08-13 RX ADMIN — GABAPENTIN 300 MG: 300 CAPSULE ORAL at 09:14

## 2025-08-13 RX ADMIN — INSULIN ASPART 1 UNITS: 100 INJECTION, SOLUTION INTRAVENOUS; SUBCUTANEOUS at 18:09

## 2025-08-13 RX ADMIN — SIMVASTATIN 40 MG: 40 TABLET, FILM COATED ORAL at 22:28

## 2025-08-13 RX ADMIN — ACETAMINOPHEN 975 MG: 325 TABLET ORAL at 22:17

## 2025-08-13 RX ADMIN — PANTOPRAZOLE SODIUM 40 MG: 20 TABLET, DELAYED RELEASE ORAL at 09:14

## 2025-08-13 RX ADMIN — SENNOSIDES, DOCUSATE SODIUM 1 TABLET: 50; 8.6 TABLET, FILM COATED ORAL at 22:18

## 2025-08-13 RX ADMIN — PIPERACILLIN SODIUM AND TAZOBACTAM SODIUM 4.5 G: 4; .5 INJECTION, POWDER, LYOPHILIZED, FOR SOLUTION INTRAVENOUS at 17:09

## 2025-08-13 RX ADMIN — LOSARTAN POTASSIUM 12.5 MG: 25 TABLET, FILM COATED ORAL at 09:13

## 2025-08-13 ASSESSMENT — ACTIVITIES OF DAILY LIVING (ADL)
ADLS_ACUITY_SCORE: 59
ADLS_ACUITY_SCORE: 59
ADLS_ACUITY_SCORE: 54
ADLS_ACUITY_SCORE: 59
ADLS_ACUITY_SCORE: 54
ADLS_ACUITY_SCORE: 59
ADLS_ACUITY_SCORE: 54
ADLS_ACUITY_SCORE: 59
ADLS_ACUITY_SCORE: 59
ADLS_ACUITY_SCORE: 55
ADLS_ACUITY_SCORE: 59
ADLS_ACUITY_SCORE: 54
ADLS_ACUITY_SCORE: 59
ADLS_ACUITY_SCORE: 54
ADLS_ACUITY_SCORE: 59
ADLS_ACUITY_SCORE: 54
ADLS_ACUITY_SCORE: 54
ADLS_ACUITY_SCORE: 55
ADLS_ACUITY_SCORE: 54

## 2025-08-14 ENCOUNTER — APPOINTMENT (OUTPATIENT)
Dept: OCCUPATIONAL THERAPY | Facility: HOSPITAL | Age: 80
DRG: 853 | End: 2025-08-14
Payer: MEDICARE

## 2025-08-14 ENCOUNTER — APPOINTMENT (OUTPATIENT)
Dept: PHYSICAL THERAPY | Facility: HOSPITAL | Age: 80
DRG: 853 | End: 2025-08-14
Payer: MEDICARE

## 2025-08-14 VITALS
RESPIRATION RATE: 20 BRPM | HEART RATE: 66 BPM | HEIGHT: 70 IN | OXYGEN SATURATION: 93 % | DIASTOLIC BLOOD PRESSURE: 64 MMHG | TEMPERATURE: 97.9 F | WEIGHT: 184.3 LBS | SYSTOLIC BLOOD PRESSURE: 116 MMHG | BODY MASS INDEX: 26.39 KG/M2

## 2025-08-14 LAB
BACTERIA SPEC CULT: NO GROWTH
BACTERIA SPEC CULT: NO GROWTH
BACTERIA TISS BX CULT: ABNORMAL
BACTERIA TISS BX CULT: ABNORMAL
BACTERIA TISS BX CULT: NORMAL
CREAT SERPL-MCNC: 1.42 MG/DL (ref 0.67–1.17)
EGFRCR SERPLBLD CKD-EPI 2021: 50 ML/MIN/1.73M2
GLUCOSE BLDC GLUCOMTR-MCNC: 141 MG/DL (ref 70–99)
GLUCOSE BLDC GLUCOMTR-MCNC: 158 MG/DL (ref 70–99)
GLUCOSE BLDC GLUCOMTR-MCNC: 172 MG/DL (ref 70–99)
GLUCOSE BLDC GLUCOMTR-MCNC: 212 MG/DL (ref 70–99)
HOLD SPECIMEN: NORMAL
PATH REPORT.COMMENTS IMP SPEC: NORMAL
PATH REPORT.COMMENTS IMP SPEC: NORMAL
PATH REPORT.FINAL DX SPEC: NORMAL
PATH REPORT.GROSS SPEC: NORMAL
PATH REPORT.MICROSCOPIC SPEC OTHER STN: NORMAL
PATH REPORT.RELEVANT HX SPEC: NORMAL
PHOTO IMAGE: NORMAL
VANCOMYCIN SERPL-MCNC: 28.1 UG/ML (ref ?–25)
VANCOMYCIN SERPL-MCNC: 28.8 UG/ML (ref ?–25)

## 2025-08-14 PROCEDURE — 250N000011 HC RX IP 250 OP 636: Performed by: PODIATRIST

## 2025-08-14 PROCEDURE — 120N000001 HC R&B MED SURG/OB

## 2025-08-14 PROCEDURE — 250N000013 HC RX MED GY IP 250 OP 250 PS 637: Performed by: INTERNAL MEDICINE

## 2025-08-14 PROCEDURE — 97535 SELF CARE MNGMENT TRAINING: CPT | Mod: GO

## 2025-08-14 PROCEDURE — G0463 HOSPITAL OUTPT CLINIC VISIT: HCPCS | Mod: 25

## 2025-08-14 PROCEDURE — 250N000013 HC RX MED GY IP 250 OP 250 PS 637: Performed by: PODIATRIST

## 2025-08-14 PROCEDURE — 36415 COLL VENOUS BLD VENIPUNCTURE: CPT | Performed by: INTERNAL MEDICINE

## 2025-08-14 PROCEDURE — 88311 DECALCIFY TISSUE: CPT | Mod: 26 | Performed by: PATHOLOGY

## 2025-08-14 PROCEDURE — 97110 THERAPEUTIC EXERCISES: CPT | Mod: GP

## 2025-08-14 PROCEDURE — 80202 ASSAY OF VANCOMYCIN: CPT | Performed by: INTERNAL MEDICINE

## 2025-08-14 PROCEDURE — 258N000003 HC RX IP 258 OP 636: Performed by: PODIATRIST

## 2025-08-14 PROCEDURE — 88305 TISSUE EXAM BY PATHOLOGIST: CPT | Mod: 26 | Performed by: PATHOLOGY

## 2025-08-14 PROCEDURE — 82565 ASSAY OF CREATININE: CPT | Performed by: PODIATRIST

## 2025-08-14 PROCEDURE — 97530 THERAPEUTIC ACTIVITIES: CPT | Mod: GO

## 2025-08-14 PROCEDURE — 36415 COLL VENOUS BLD VENIPUNCTURE: CPT | Performed by: HOSPITALIST

## 2025-08-14 PROCEDURE — 97530 THERAPEUTIC ACTIVITIES: CPT | Mod: GP

## 2025-08-14 PROCEDURE — 97605 NEG PRS WND THER DME<=50SQCM: CPT

## 2025-08-14 PROCEDURE — 80202 ASSAY OF VANCOMYCIN: CPT | Performed by: HOSPITALIST

## 2025-08-14 PROCEDURE — 99233 SBSQ HOSP IP/OBS HIGH 50: CPT | Performed by: HOSPITALIST

## 2025-08-14 RX ORDER — VANCOMYCIN HYDROCHLORIDE 1 G/200ML
1000 INJECTION, SOLUTION INTRAVENOUS EVERY 24 HOURS
Status: DISCONTINUED | OUTPATIENT
Start: 2025-08-15 | End: 2025-08-15

## 2025-08-14 RX ADMIN — LOSARTAN POTASSIUM 12.5 MG: 25 TABLET, FILM COATED ORAL at 09:18

## 2025-08-14 RX ADMIN — SODIUM CHLORIDE 1250 MG: 0.9 INJECTION, SOLUTION INTRAVENOUS at 05:22

## 2025-08-14 RX ADMIN — SIMVASTATIN 40 MG: 40 TABLET, FILM COATED ORAL at 21:58

## 2025-08-14 RX ADMIN — PANTOPRAZOLE SODIUM 40 MG: 20 TABLET, DELAYED RELEASE ORAL at 09:18

## 2025-08-14 RX ADMIN — SPIRONOLACTONE 25 MG: 25 TABLET, FILM COATED ORAL at 09:19

## 2025-08-14 RX ADMIN — ACETAMINOPHEN 975 MG: 325 TABLET ORAL at 13:16

## 2025-08-14 RX ADMIN — SENNOSIDES, DOCUSATE SODIUM 1 TABLET: 50; 8.6 TABLET, FILM COATED ORAL at 09:19

## 2025-08-14 RX ADMIN — PIPERACILLIN SODIUM AND TAZOBACTAM SODIUM 4.5 G: 4; .5 INJECTION, POWDER, LYOPHILIZED, FOR SOLUTION INTRAVENOUS at 04:39

## 2025-08-14 RX ADMIN — APIXABAN 5 MG: 2.5 TABLET, FILM COATED ORAL at 09:17

## 2025-08-14 RX ADMIN — FUROSEMIDE 40 MG: 20 TABLET ORAL at 09:18

## 2025-08-14 RX ADMIN — GABAPENTIN 300 MG: 300 CAPSULE ORAL at 20:05

## 2025-08-14 RX ADMIN — TAMSULOSIN HYDROCHLORIDE 0.4 MG: 0.4 CAPSULE ORAL at 20:05

## 2025-08-14 RX ADMIN — PIPERACILLIN SODIUM AND TAZOBACTAM SODIUM 4.5 G: 4; .5 INJECTION, POWDER, LYOPHILIZED, FOR SOLUTION INTRAVENOUS at 22:01

## 2025-08-14 RX ADMIN — DICLOFENAC SODIUM 4 G: 10 GEL TOPICAL at 13:16

## 2025-08-14 RX ADMIN — GABAPENTIN 300 MG: 300 CAPSULE ORAL at 09:18

## 2025-08-14 RX ADMIN — DICLOFENAC SODIUM 4 G: 10 GEL TOPICAL at 20:05

## 2025-08-14 RX ADMIN — DICLOFENAC SODIUM 4 G: 10 GEL TOPICAL at 09:25

## 2025-08-14 RX ADMIN — INSULIN ASPART 1 UNITS: 100 INJECTION, SOLUTION INTRAVENOUS; SUBCUTANEOUS at 09:08

## 2025-08-14 RX ADMIN — PIPERACILLIN SODIUM AND TAZOBACTAM SODIUM 4.5 G: 4; .5 INJECTION, POWDER, LYOPHILIZED, FOR SOLUTION INTRAVENOUS at 16:21

## 2025-08-14 RX ADMIN — CLOPIDOGREL 75 MG: 75 TABLET ORAL at 09:18

## 2025-08-14 RX ADMIN — ACETAMINOPHEN 975 MG: 325 TABLET ORAL at 20:05

## 2025-08-14 RX ADMIN — APIXABAN 5 MG: 2.5 TABLET, FILM COATED ORAL at 20:05

## 2025-08-14 RX ADMIN — INSULIN ASPART 1 UNITS: 100 INJECTION, SOLUTION INTRAVENOUS; SUBCUTANEOUS at 13:15

## 2025-08-14 RX ADMIN — PIPERACILLIN SODIUM AND TAZOBACTAM SODIUM 4.5 G: 4; .5 INJECTION, POWDER, LYOPHILIZED, FOR SOLUTION INTRAVENOUS at 09:18

## 2025-08-14 RX ADMIN — INSULIN ASPART 1 UNITS: 100 INJECTION, SOLUTION INTRAVENOUS; SUBCUTANEOUS at 19:20

## 2025-08-14 RX ADMIN — ACETAMINOPHEN 975 MG: 325 TABLET ORAL at 09:17

## 2025-08-14 ASSESSMENT — ACTIVITIES OF DAILY LIVING (ADL)
ADLS_ACUITY_SCORE: 55
ADLS_ACUITY_SCORE: 55
ADLS_ACUITY_SCORE: 54
ADLS_ACUITY_SCORE: 54
ADLS_ACUITY_SCORE: 55
ADLS_ACUITY_SCORE: 54
ADLS_ACUITY_SCORE: 55
ADLS_ACUITY_SCORE: 54
ADLS_ACUITY_SCORE: 54
ADLS_ACUITY_SCORE: 55
ADLS_ACUITY_SCORE: 55
ADLS_ACUITY_SCORE: 54
ADLS_ACUITY_SCORE: 55
ADLS_ACUITY_SCORE: 54
ADLS_ACUITY_SCORE: 55
ADLS_ACUITY_SCORE: 54
ADLS_ACUITY_SCORE: 55

## 2025-08-15 VITALS
BODY MASS INDEX: 26.39 KG/M2 | OXYGEN SATURATION: 93 % | SYSTOLIC BLOOD PRESSURE: 135 MMHG | HEART RATE: 63 BPM | RESPIRATION RATE: 20 BRPM | DIASTOLIC BLOOD PRESSURE: 65 MMHG | WEIGHT: 184.3 LBS | HEIGHT: 70 IN | TEMPERATURE: 97.6 F

## 2025-08-15 LAB
BACTERIA TISS BX CULT: ABNORMAL
BACTERIA TISS BX CULT: ABNORMAL
CREAT SERPL-MCNC: 1.31 MG/DL (ref 0.67–1.17)
EGFRCR SERPLBLD CKD-EPI 2021: 55 ML/MIN/1.73M2
GLUCOSE BLDC GLUCOMTR-MCNC: 153 MG/DL (ref 70–99)
GLUCOSE BLDC GLUCOMTR-MCNC: 191 MG/DL (ref 70–99)
GLUCOSE BLDC GLUCOMTR-MCNC: 359 MG/DL (ref 70–99)

## 2025-08-15 PROCEDURE — 250N000013 HC RX MED GY IP 250 OP 250 PS 637: Performed by: PODIATRIST

## 2025-08-15 PROCEDURE — 250N000013 HC RX MED GY IP 250 OP 250 PS 637: Performed by: INTERNAL MEDICINE

## 2025-08-15 PROCEDURE — 250N000011 HC RX IP 250 OP 636: Performed by: HOSPITALIST

## 2025-08-15 PROCEDURE — 82565 ASSAY OF CREATININE: CPT | Performed by: HOSPITALIST

## 2025-08-15 PROCEDURE — 36415 COLL VENOUS BLD VENIPUNCTURE: CPT | Performed by: HOSPITALIST

## 2025-08-15 PROCEDURE — 99239 HOSP IP/OBS DSCHRG MGMT >30: CPT | Performed by: HOSPITALIST

## 2025-08-15 PROCEDURE — 250N000011 HC RX IP 250 OP 636: Performed by: PODIATRIST

## 2025-08-15 PROCEDURE — 250N000013 HC RX MED GY IP 250 OP 250 PS 637: Performed by: HOSPITALIST

## 2025-08-15 RX ORDER — GABAPENTIN 300 MG/1
300 CAPSULE ORAL 2 TIMES DAILY
DISCHARGE
Start: 2025-08-15

## 2025-08-15 RX ORDER — DOXYCYCLINE 100 MG/1
100 CAPSULE ORAL EVERY 12 HOURS
DISCHARGE
Start: 2025-08-15 | End: 2025-08-15

## 2025-08-15 RX ORDER — ARGININE/GLUTAMINE/CALCIUM BMB 7G-7G-1.5G
1 POWDER IN PACKET (EA) ORAL 2 TIMES DAILY WITH MEALS
DISCHARGE
Start: 2025-08-15

## 2025-08-15 RX ORDER — DOXYCYCLINE 100 MG/1
100 CAPSULE ORAL EVERY 12 HOURS
Status: SHIPPED
Start: 2025-08-15 | End: 2025-08-25

## 2025-08-15 RX ORDER — DIPHENHYDRAMINE HCL 25 MG
25 CAPSULE ORAL ONCE
Status: COMPLETED | OUTPATIENT
Start: 2025-08-15 | End: 2025-08-15

## 2025-08-15 RX ORDER — ARGININE/GLUTAMINE/CALCIUM BMB 7G-7G-1.5G
1 POWDER IN PACKET (EA) ORAL 2 TIMES DAILY WITH MEALS
Status: DISCONTINUED | OUTPATIENT
Start: 2025-08-15 | End: 2025-08-15 | Stop reason: HOSPADM

## 2025-08-15 RX ORDER — ACETAMINOPHEN 325 MG/1
975 TABLET ORAL 3 TIMES DAILY
DISCHARGE
Start: 2025-08-15

## 2025-08-15 RX ORDER — DOXYCYCLINE 100 MG/1
100 CAPSULE ORAL EVERY 12 HOURS SCHEDULED
Status: DISCONTINUED | OUTPATIENT
Start: 2025-08-15 | End: 2025-08-15 | Stop reason: HOSPADM

## 2025-08-15 RX ORDER — DIPHENHYDRAMINE HCL 25 MG
25 CAPSULE ORAL EVERY 6 HOURS PRN
DISCHARGE
Start: 2025-08-15

## 2025-08-15 RX ADMIN — PANTOPRAZOLE SODIUM 40 MG: 20 TABLET, DELAYED RELEASE ORAL at 09:07

## 2025-08-15 RX ADMIN — APIXABAN 5 MG: 2.5 TABLET, FILM COATED ORAL at 09:06

## 2025-08-15 RX ADMIN — DIPHENHYDRAMINE HYDROCHLORIDE 25 MG: 25 CAPSULE ORAL at 09:07

## 2025-08-15 RX ADMIN — INSULIN ASPART 1 UNITS: 100 INJECTION, SOLUTION INTRAVENOUS; SUBCUTANEOUS at 13:06

## 2025-08-15 RX ADMIN — Medication 1 PACKET: at 13:05

## 2025-08-15 RX ADMIN — FAMOTIDINE 20 MG: 10 INJECTION, SOLUTION INTRAVENOUS at 09:08

## 2025-08-15 RX ADMIN — CLOPIDOGREL 75 MG: 75 TABLET ORAL at 09:06

## 2025-08-15 RX ADMIN — SPIRONOLACTONE 25 MG: 25 TABLET, FILM COATED ORAL at 09:06

## 2025-08-15 RX ADMIN — ACETAMINOPHEN 975 MG: 325 TABLET ORAL at 09:06

## 2025-08-15 RX ADMIN — DOXYCYCLINE HYCLATE 100 MG: 100 CAPSULE ORAL at 09:13

## 2025-08-15 RX ADMIN — INSULIN ASPART 2 UNITS: 100 INJECTION, SOLUTION INTRAVENOUS; SUBCUTANEOUS at 09:05

## 2025-08-15 RX ADMIN — SENNOSIDES, DOCUSATE SODIUM 2 TABLET: 50; 8.6 TABLET, FILM COATED ORAL at 09:06

## 2025-08-15 RX ADMIN — GABAPENTIN 300 MG: 300 CAPSULE ORAL at 09:06

## 2025-08-15 RX ADMIN — FUROSEMIDE 40 MG: 20 TABLET ORAL at 09:06

## 2025-08-15 RX ADMIN — DICLOFENAC SODIUM 4 G: 10 GEL TOPICAL at 13:06

## 2025-08-15 RX ADMIN — POLYETHYLENE GLYCOL 3350 17 G: 17 POWDER, FOR SOLUTION ORAL at 09:07

## 2025-08-15 RX ADMIN — PIPERACILLIN SODIUM AND TAZOBACTAM SODIUM 4.5 G: 4; .5 INJECTION, POWDER, LYOPHILIZED, FOR SOLUTION INTRAVENOUS at 03:37

## 2025-08-15 RX ADMIN — LOSARTAN POTASSIUM 12.5 MG: 25 TABLET, FILM COATED ORAL at 09:07

## 2025-08-15 RX ADMIN — ACETAMINOPHEN 975 MG: 325 TABLET ORAL at 13:06

## 2025-08-15 RX ADMIN — DICLOFENAC SODIUM 4 G: 10 GEL TOPICAL at 09:08

## 2025-08-15 ASSESSMENT — ACTIVITIES OF DAILY LIVING (ADL)
ADLS_ACUITY_SCORE: 54

## 2025-08-19 ENCOUNTER — NURSING HOME VISIT (OUTPATIENT)
Dept: GERIATRICS | Facility: CLINIC | Age: 80
End: 2025-08-19
Payer: MEDICARE

## 2025-08-19 VITALS
BODY MASS INDEX: 25.22 KG/M2 | HEART RATE: 80 BPM | SYSTOLIC BLOOD PRESSURE: 102 MMHG | WEIGHT: 176.2 LBS | RESPIRATION RATE: 26 BRPM | HEIGHT: 70 IN | OXYGEN SATURATION: 96 % | TEMPERATURE: 98.2 F | DIASTOLIC BLOOD PRESSURE: 64 MMHG

## 2025-08-19 LAB — BACTERIA TISS BX CULT: NORMAL

## 2025-08-20 ENCOUNTER — DOCUMENTATION ONLY (OUTPATIENT)
Dept: OTHER | Facility: CLINIC | Age: 80
End: 2025-08-20
Payer: MEDICARE

## 2025-08-28 ENCOUNTER — TELEPHONE (OUTPATIENT)
Dept: VASCULAR SURGERY | Facility: CLINIC | Age: 80
End: 2025-08-28
Payer: MEDICARE

## 2025-08-28 PROBLEM — L89.613 PRESSURE ULCER OF RIGHT HEEL, STAGE 3 (H): Status: ACTIVE | Noted: 2025-08-28

## 2025-08-28 PROBLEM — L97.515 ULCER OF RIGHT FOOT WITH MUSCLE INVOLVEMENT WITHOUT EVIDENCE OF NECROSIS (H): Status: ACTIVE | Noted: 2025-08-28

## (undated) DEVICE — GLOVE BIOGEL PI INDICATOR 8.0 LF 41680

## (undated) DEVICE — Device

## (undated) DEVICE — CONTAINER URINE SPEC 4OZ STRL 1053

## (undated) DEVICE — BONE CLEANING TIP INTERPULSE  0210-010-000

## (undated) DEVICE — CUFF TOURN 18IN STRL DISP

## (undated) DEVICE — SOL WATER IRRIG 1000ML BOTTLE 2F7114

## (undated) DEVICE — PREP POVIDONE-IODINE 10% SOLUTION 4OZ BOTTLE MDS093944

## (undated) DEVICE — TRAY PREP DRY SKIN SCRUB 067

## (undated) DEVICE — GLOVE BIOGEL PI ORTHOPRO SZ 7.5 47675

## (undated) DEVICE — SUCTION MANIFOLD NEPTUNE 2 SYS 1 PORT 702-025-000

## (undated) DEVICE — DRSG ADAPTIC 3X8" 6113

## (undated) DEVICE — ESU PENCIL SMOKE EVAC W/ROCKER SWITCH 0703-047-000

## (undated) DEVICE — DRSG KERLIX 4 1/2"X4YDS ROLL 6715

## (undated) DEVICE — DRAPE SHEET BILAT LIMB W/ ARM COVERS

## (undated) DEVICE — BLADE KNIFE SURG 10 371110

## (undated) DEVICE — SOL NACL 0.9% INJ 1000ML BAG 2B1324X

## (undated) DEVICE — DRAPE STERI U 1015

## (undated) DEVICE — SUCTION IRR SYSTEM W/O TIP INTERPULSE HANDPIECE 0210-100-000

## (undated) DEVICE — DRSG GAUZE 4X4" TRAY 6939

## (undated) DEVICE — SU VICRYL+ 3-0 27IN SH UND VCP416H

## (undated) DEVICE — SYR 10ML LL W/O NDL 302995

## (undated) DEVICE — SU ETHILON 3-0 PS-2 18" 1669H

## (undated) DEVICE — DRSG ABD TNDRSRB WET PRUF 8IN X 10IN STRL  9194A

## (undated) DEVICE — GOWN LG DISP 9515

## (undated) DEVICE — CUSTOM PACK LOWER EXTREMITY SOP5BLEHEA

## (undated) DEVICE — PLATE GROUNDING ADULT W/CORD 9165L

## (undated) DEVICE — BLADE SAW OSCIL/SAG STRK MICRO 9X31X0.38MM 2296-003-225

## (undated) DEVICE — SOLUTION WATER 1000ML BOTTLE R5000-01

## (undated) DEVICE — PREP POVIDONE-IODINE 7.5% SCRUB 4OZ BOTTLE MDS093945

## (undated) DEVICE — BANDAGE ESMARK 4 X 3 YARDS STL 23578-143

## (undated) DEVICE — ESU GROUND PAD ADULT REM W/15' CORD E7507DB

## (undated) DEVICE — DRSG KERLIX SUPER SPONGE 6X6.75" 2585

## (undated) DEVICE — BANDAGE ELASTIC 6X550 LF DBL 593-96LF

## (undated) DEVICE — BNDG KLING 4" 2236

## (undated) DEVICE — PACKING IODOFORM STRIP 1/4" 7831

## (undated) DEVICE — DECANTER VIAL 2006S

## (undated) DEVICE — NEEDLE HYPO 18X1-1/2 SAFETY 305918

## (undated) DEVICE — KIT TURNOVER FAIRVIEW SOUTHDALE FULL SP3889

## (undated) DEVICE — SOLUTION IRRIGATION 0.9% NACL 1000ML BOTTLE R5200-01

## (undated) DEVICE — DRSG GAUZE 4X4" TRAY

## (undated) DEVICE — NDL 25GA 1.5" 305127

## (undated) DEVICE — PACKING IODOFORM STRIP 1/2" 7832

## (undated) DEVICE — SOL NACL 0.9% IRRIG 1000ML BOTTLE 2F7124

## (undated) DEVICE — DRSG ADAPTIC 3X3" 6112

## (undated) DEVICE — DRAPE STOCKINETTE IMPERVIOUS 12" 1587

## (undated) DEVICE — CAST PADDING 6" STERILE 9046S

## (undated) DEVICE — SU ETHILON 2-0 FS 18" 664G

## (undated) DEVICE — SUTURE VICRYL+ 2-0 27IN CT-1 UND VCP259H

## (undated) RX ORDER — FENTANYL CITRATE-0.9 % NACL/PF 10 MCG/ML
PLASTIC BAG, INJECTION (ML) INTRAVENOUS
Status: DISPENSED
Start: 2025-08-12

## (undated) RX ORDER — FENTANYL CITRATE 50 UG/ML
INJECTION, SOLUTION INTRAMUSCULAR; INTRAVENOUS
Status: DISPENSED
Start: 2023-03-10

## (undated) RX ORDER — BUPIVACAINE HYDROCHLORIDE 5 MG/ML
INJECTION, SOLUTION EPIDURAL; INTRACAUDAL
Status: DISPENSED
Start: 2023-04-27

## (undated) RX ORDER — PROPOFOL 10 MG/ML
INJECTION, EMULSION INTRAVENOUS
Status: DISPENSED
Start: 2023-04-27

## (undated) RX ORDER — PROPOFOL 10 MG/ML
INJECTION, EMULSION INTRAVENOUS
Status: DISPENSED
Start: 2025-08-12

## (undated) RX ORDER — PROPOFOL 10 MG/ML
INJECTION, EMULSION INTRAVENOUS
Status: DISPENSED
Start: 2023-05-15

## (undated) RX ORDER — FENTANYL CITRATE 50 UG/ML
INJECTION, SOLUTION INTRAMUSCULAR; INTRAVENOUS
Status: DISPENSED
Start: 2023-01-24

## (undated) RX ORDER — LIDOCAINE HYDROCHLORIDE 10 MG/ML
INJECTION, SOLUTION INFILTRATION; PERINEURAL
Status: DISPENSED
Start: 2025-07-24

## (undated) RX ORDER — OXYCODONE HYDROCHLORIDE 5 MG/1
TABLET ORAL
Status: DISPENSED
Start: 2023-03-10

## (undated) RX ORDER — BUPIVACAINE HYDROCHLORIDE 5 MG/ML
INJECTION, SOLUTION EPIDURAL; INTRACAUDAL
Status: DISPENSED
Start: 2023-06-01

## (undated) RX ORDER — ONDANSETRON 2 MG/ML
INJECTION INTRAMUSCULAR; INTRAVENOUS
Status: DISPENSED
Start: 2023-05-15

## (undated) RX ORDER — HEPARIN SODIUM 200 [USP'U]/100ML
INJECTION, SOLUTION INTRAVENOUS
Status: DISPENSED
Start: 2025-07-24

## (undated) RX ORDER — LIDOCAINE HYDROCHLORIDE 10 MG/ML
INJECTION, SOLUTION INFILTRATION; PERINEURAL
Status: DISPENSED
Start: 2023-03-10

## (undated) RX ORDER — LIDOCAINE HYDROCHLORIDE 10 MG/ML
INJECTION, SOLUTION EPIDURAL; INFILTRATION; INTRACAUDAL; PERINEURAL
Status: DISPENSED
Start: 2025-08-12

## (undated) RX ORDER — HEPARIN SODIUM 1000 [USP'U]/ML
INJECTION, SOLUTION INTRAVENOUS; SUBCUTANEOUS
Status: DISPENSED
Start: 2023-03-10

## (undated) RX ORDER — CLOPIDOGREL BISULFATE 75 MG/1
TABLET ORAL
Status: DISPENSED
Start: 2023-03-10

## (undated) RX ORDER — PROTAMINE SULFATE 10 MG/ML
INJECTION, SOLUTION INTRAVENOUS
Status: DISPENSED
Start: 2023-03-10

## (undated) RX ORDER — FENTANYL CITRATE 50 UG/ML
INJECTION, SOLUTION INTRAMUSCULAR; INTRAVENOUS
Status: DISPENSED
Start: 2023-05-15

## (undated) RX ORDER — LIDOCAINE HYDROCHLORIDE 10 MG/ML
INJECTION, SOLUTION EPIDURAL; INFILTRATION; INTRACAUDAL; PERINEURAL
Status: DISPENSED
Start: 2023-01-24

## (undated) RX ORDER — HEPARIN SODIUM 200 [USP'U]/100ML
INJECTION, SOLUTION INTRAVENOUS
Status: DISPENSED
Start: 2023-01-24

## (undated) RX ORDER — BUPIVACAINE HYDROCHLORIDE 5 MG/ML
INJECTION, SOLUTION EPIDURAL; INTRACAUDAL
Status: DISPENSED
Start: 2023-05-15

## (undated) RX ORDER — FENTANYL CITRATE 50 UG/ML
INJECTION, SOLUTION INTRAMUSCULAR; INTRAVENOUS
Status: DISPENSED
Start: 2025-07-24